# Patient Record
Sex: FEMALE | Race: WHITE | NOT HISPANIC OR LATINO | Employment: OTHER | URBAN - METROPOLITAN AREA
[De-identification: names, ages, dates, MRNs, and addresses within clinical notes are randomized per-mention and may not be internally consistent; named-entity substitution may affect disease eponyms.]

---

## 2017-01-20 ENCOUNTER — HOSPITAL ENCOUNTER (EMERGENCY)
Facility: MEDICAL CENTER | Age: 57
End: 2017-01-21
Attending: EMERGENCY MEDICINE
Payer: MEDICAID

## 2017-01-20 DIAGNOSIS — F10.920 ACUTE ALCOHOL INTOXICATION, UNCOMPLICATED (HCC): ICD-10-CM

## 2017-01-20 PROCEDURE — 99285 EMERGENCY DEPT VISIT HI MDM: CPT

## 2017-01-20 NOTE — ED AVS SNAPSHOT
1/21/2017          Ashleigh Marquis  [address] [place] [address] Street  [facility] [address]    Dear Ashleigh:    [facility] wants to ensure your discharge home is safe and you or your loved ones have had all your questions answered regarding your care after you leave the hospital.    You may receive a telephone call within two days of your discharge.  This call is to make certain you understand your discharge instructions as well as ensure we provided you with the best care possible during your stay with us.     The call will only last approximately 3-5 minutes and will be done by a nurse.    Once again, we want to ensure your discharge home is safe and that you have a clear understanding of any next steps in your care.  If you have any questions or concerns, please do not hesitate to contact us, we are here for you.  Thank you for choosing [facility] for your healthcare needs.    Sincerely,    David Ibrahim    [facility]

## 2017-01-20 NOTE — ED AVS SNAPSHOT
After Visit Summary                                                                                                                Ashleigh Marquis   MRN: [id number]    Department:  [facility], Emergency Dept   Date of Visit:  1/20/2017            [facility], Emergency Dept    [address]    [place] [address]    Phone:  [phone]      You were seen by     1. Ronal Barber M.D.    2. Matt Joyce M.D.      Your Diagnosis Was     Acute alcohol intoxication, uncomplicated (CMS-HCC)     F10.120       Follow-up Information     1. Follow up with [facility] In 1 day.    Specialty:  Family Medicine    Contact information    [address]  [address]  [place] [address]  [phone]        Medication Information     Review all of your home medications and newly ordered medications with your primary doctor and/or pharmacist as soon as possible. Follow medication instructions as directed by your doctor and/or pharmacist.     Please keep your complete medication list with you and share with your physician. Update the information when medications are discontinued, doses are changed, or new medications (including over-the-counter products) are added; and carry medication information at all times in the event of emergency situations.               Medication List      Notice     You have not been prescribed any medications.              Discharge Instructions       Alcohol Intoxication  Alcohol intoxication occurs when the amount of alcohol that a person has consumed impairs his or her ability to mentally and physically function. Alcohol directly impairs the normal chemical activity of the brain. Drinking large amounts of alcohol can lead to changes in mental function and behavior, and it can cause many physical effects that can be harmful.   Alcohol intoxication can range in severity from mild to very severe. Various factors can affect the level of intoxication that  "occurs, such as the person's age, gender, weight, frequency of alcohol consumption, and the presence of other medical conditions (such as diabetes, seizures, or heart conditions). Dangerous levels of alcohol intoxication may occur when people drink large amounts of alcohol in a short period (binge drinking). Alcohol can also be especially dangerous when combined with certain prescription medicines or \"recreational\" drugs.  SIGNS AND SYMPTOMS  Some common signs and symptoms of mild alcohol intoxication include:  · Loss of coordination.  · Changes in mood and behavior.  · Impaired judgment.  · Slurred speech.  As alcohol intoxication progresses to more severe levels, other signs and symptoms will appear. These may include:  · Vomiting.  · Confusion and impaired memory.  · Slowed breathing.  · Seizures.  · Loss of consciousness.  DIAGNOSIS   Your health care provider will take a medical history and perform a physical exam. You will be asked about the amount and type of alcohol you have consumed. Blood tests will be done to measure the concentration of alcohol in your blood. In many places, your blood alcohol level must be lower than 80 mg/dL (0.08%) to legally drive. However, many dangerous effects of alcohol can occur at much lower levels.   TREATMENT   People with alcohol intoxication often do not require treatment. Most of the effects of alcohol intoxication are temporary, and they go away as the alcohol naturally leaves the body. Your health care provider will monitor your condition until you are stable enough to go home. Fluids are sometimes given through an IV access tube to help prevent dehydration.   HOME CARE INSTRUCTIONS  · Do not drive after drinking alcohol.  · Stay hydrated. Drink enough water and fluids to keep your urine clear or pale yellow. Avoid caffeine.    · Only take over-the-counter or prescription medicines as directed by your health care provider.    SEEK MEDICAL CARE IF:   · You have persistent " vomiting.    · You do not feel better after a few days.  · You have frequent alcohol intoxication. Your health care provider can help determine if you should see a substance use treatment counselor.  SEEK IMMEDIATE MEDICAL CARE IF:   · You become shaky or tremble when you try to stop drinking.    · You shake uncontrollably (seizure).    · You throw up (vomit) blood. This may be bright red or may look like black coffee grounds.    · You have blood in your stool. This may be bright red or may appear as a black, tarry, bad smelling stool.    · You become lightheaded or faint.    MAKE SURE YOU:   · Understand these instructions.  · Will watch your condition.  · Will get help right away if you are not doing well or get worse.     This information is not intended to replace advice given to you by your health care provider. Make sure you discuss any questions you have with your health care provider.     Document Released: 09/27/2006 Document Revised: 08/20/2014 Document Reviewed: 05/23/2014  [employer] Interactive Patient Education ©2016 [employer]            Patient Information     Patient Information    Following emergency treatment: all patient requiring follow-up care must return either to a private physician or a clinic if your condition worsens before you are able to obtain further medical attention, please return to the emergency room.     Billing Information    At [facility], we work to make the billing process streamlined for our patients.  Our Representatives are here to answer any questions you may have regarding your hospital bill.  If you have insurance coverage and have supplied your insurance information to us, we will submit a claim to your insurer on your behalf.  Should you have any questions regarding your bill, we can be reached online or by phone as follows:  Online: You are able pay your bills online or live chat with our representatives about any billing questions you may have. We are here to  help Monday - Friday from 8:00am to 7:30pm and 9:00am - 12:00pm on Saturdays.  Please visit [email or web address]  for more information.   Phone:  [phone] or [phone]    Please note that your emergency physician, surgeon, pathologist, radiologist, anesthesiologist, and other specialists are not employed by [facility] and will therefore bill separately for their services.  Please contact them directly for any questions concerning their bills at the numbers below:     Emergency Physician Services:  [phone]  [facility]:  [phone]  [facility]:  [phone]  [facility]:  [phone]    1. Your final bill may vary from the amount quoted upon discharge if all procedures are not complete at that time, or if your doctor has additional procedures of which we are not aware. You will receive an additional bill if you return to the Emergency Department at [facility] access hospital for suture removal regardless of the facility of which the sutures were placed.     2. Please arrange for settlement of this account at the emergency registration.    3. All self-pay accounts are due in full at the time of treatment.  If you are unable to meet this obligation then payment is expected within 4-5 days.     4. If you have had radiology studies (CT, X-ray, Ultrasound, MRI), you have received a preliminary result during your emergency department visit. Please contact the radiology department [phone] to receive a copy of your final result. Please discuss the Final result with your primary physician or with the follow up physician provided.     Crisis Hotline:  [place] Crisis Hotline:  0-191-TEGZCOZ or 1-654.537.4064  [place] Crisis Hotline:    1-925.370.7030 or 860-864-7083         ED Discharge Follow Up Questions    1. In order to provide you with very good care, we would like to follow up with a phone call in the next few days.  May we have  your permission to contact you?     YES /  NO    2. What is the best phone number to call you? (       )_____-__________    3. What is the best time to call you?      Morning  /  Afternoon  /  Evening                   Patient Signature:  ____________________________________________________________    Date:  ____________________________________________________________

## 2017-01-20 NOTE — ED AVS SNAPSHOT
[employer] Access Code: Activation code not generated  Current [employer] Status: Patient Declined    Your email address is not on file at [facility].  Email Addresses are required for you to sign up for [employer], please contact [phone] to verify your personal information and to provide your email address prior to attempting to register for [employer].    Ashleigh Marquis  [address] [place] [address]    [employer]  A secure, online tool to manage your health information     [facility]’s [employer]® is a secure, online tool that connects you to your personalized health information from the privacy of your home -- day or night - making it very easy for you to manage your healthcare. Once the activation process is completed, you can even access your medical information using the [employer] kiel, which is available for free in the Apple Kiel store or Google Play store.     To learn more about [employer], visit [email or web address]    There are two levels of access available (as shown below):   My Chart Features  [facility] Primary Care Doctor [facility]  Specialists [facility]  Urgent  Care Non-[facility] Primary Care Doctor   Email your healthcare team securely and privately 24/7 X X X    Manage appointments: schedule your next appointment; view details of past/upcoming appointments X      Request prescription refills. X      View recent personal medical records, including lab and immunizations X X X X   View health record, including health history, allergies, medications X X X X   Read reports about your outpatient visits, procedures, consult and ER notes X X X X   See your discharge summary, which is a recap of your hospital and/or ER visit that includes your diagnosis, lab results, and care plan X X  X     How to register for [employer]igs[employer]24[employer]:  Once your e-mail address has been verified, follow the following steps to sign up for [employer].     1. Go to  [email or web address]  2. Click on the Sign Up Now box, which takes you to the New  Member Sign Up page. You will need to provide the following information:  a. Enter your [employer] Access Code exactly as it appears at the top of this page. (You will not need to use this code after you’ve completed the sign-up process. If you do not sign up before the expiration date, you must request a new code.)   b. Enter your date of birth.   c. Enter your home email address.   d. Click Submit, and follow the next screen’s instructions.  3. Create a [employer] ID. This will be your [employer] login ID and cannot be changed, so think of one that is secure and easy to remember.  4. Create a [employer] password. You can change your password at any time.  5. Enter your Password Reset Question and Answer. This can be used at a later time if you forget your password.   6. Enter your e-mail address. This allows you to receive e-mail notifications when new information is available in [employer].  7. Click Sign Up. You can now view your health information.    For assistance activating your [employer] account, call [phone]

## 2017-01-21 VITALS
RESPIRATION RATE: 16 BRPM | OXYGEN SATURATION: 99 % | TEMPERATURE: 97.9 F | HEART RATE: 75 BPM | DIASTOLIC BLOOD PRESSURE: 65 MMHG | WEIGHT: 180 LBS | HEIGHT: 66 IN | SYSTOLIC BLOOD PRESSURE: 130 MMHG | BODY MASS INDEX: 28.93 KG/M2

## 2017-01-21 NOTE — ED NOTES
Pt gets out of bed while this RN and ERP are standing at nurses station outside room. Pt takes 2 steps and falls into the door. Pt blocking door and ability to enter room for a minute, pt rolls over as directed and is full assist x 3 to lift back into bed with ERP. ERP examines pt and no new orders received. Bedside RN Sadie ADDISON notified. Pt placed in 2 point restraints.

## 2017-01-21 NOTE — DISCHARGE INSTRUCTIONS
"Alcohol Intoxication  Alcohol intoxication occurs when the amount of alcohol that a person has consumed impairs his or her ability to mentally and physically function. Alcohol directly impairs the normal chemical activity of the brain. Drinking large amounts of alcohol can lead to changes in mental function and behavior, and it can cause many physical effects that can be harmful.   Alcohol intoxication can range in severity from mild to very severe. Various factors can affect the level of intoxication that occurs, such as the person's age, gender, weight, frequency of alcohol consumption, and the presence of other medical conditions (such as diabetes, seizures, or heart conditions). Dangerous levels of alcohol intoxication may occur when people drink large amounts of alcohol in a short period (binge drinking). Alcohol can also be especially dangerous when combined with certain prescription medicines or \"recreational\" drugs.  SIGNS AND SYMPTOMS  Some common signs and symptoms of mild alcohol intoxication include:  · Loss of coordination.  · Changes in mood and behavior.  · Impaired judgment.  · Slurred speech.  As alcohol intoxication progresses to more severe levels, other signs and symptoms will appear. These may include:  · Vomiting.  · Confusion and impaired memory.  · Slowed breathing.  · Seizures.  · Loss of consciousness.  DIAGNOSIS   Your health care provider will take a medical history and perform a physical exam. You will be asked about the amount and type of alcohol you have consumed. Blood tests will be done to measure the concentration of alcohol in your blood. In many places, your blood alcohol level must be lower than 80 mg/dL (0.08%) to legally drive. However, many dangerous effects of alcohol can occur at much lower levels.   TREATMENT   People with alcohol intoxication often do not require treatment. Most of the effects of alcohol intoxication are temporary, and they go away as the alcohol naturally " leaves the body. Your health care provider will monitor your condition until you are stable enough to go home. Fluids are sometimes given through an IV access tube to help prevent dehydration.   HOME CARE INSTRUCTIONS  · Do not drive after drinking alcohol.  · Stay hydrated. Drink enough water and fluids to keep your urine clear or pale yellow. Avoid caffeine.    · Only take over-the-counter or prescription medicines as directed by your health care provider.    SEEK MEDICAL CARE IF:   · You have persistent vomiting.    · You do not feel better after a few days.  · You have frequent alcohol intoxication. Your health care provider can help determine if you should see a substance use treatment counselor.  SEEK IMMEDIATE MEDICAL CARE IF:   · You become shaky or tremble when you try to stop drinking.    · You shake uncontrollably (seizure).    · You throw up (vomit) blood. This may be bright red or may look like black coffee grounds.    · You have blood in your stool. This may be bright red or may appear as a black, tarry, bad smelling stool.    · You become lightheaded or faint.    MAKE SURE YOU:   · Understand these instructions.  · Will watch your condition.  · Will get help right away if you are not doing well or get worse.     This information is not intended to replace advice given to you by your health care provider. Make sure you discuss any questions you have with your health care provider.     Document Released: 09/27/2006 Document Revised: 08/20/2014 Document Reviewed: 05/23/2014  [employer] Interactive Patient Education ©2016 [employer].

## 2017-01-21 NOTE — ED NOTES
"Pt swinging legs over railing and attempting to kick staff, screaming \"fuck you\" over and over. 1 restraint applied to leg for safety.  "

## 2017-01-21 NOTE — ED NOTES
Pt given discharge and follow up instructions,  all questions answered,  pt verbalized understanding. Pt discharged with self. Copy of discharge provided to pt. Signed copy in chart.  Pt states that all personal belongings are in possession. Pt ambulatory off unit.

## 2017-01-21 NOTE — ED NOTES
Pt ambulatory to bathroom with steady gait, speech clear, requesting to wait until 0515 before being discharged.

## 2017-01-21 NOTE — ED PROVIDER NOTES
"ED Provider Note    CHIEF COMPLAINT  Chief Complaint   Patient presents with   • Alcohol Intoxication       HPI  Ashleigh Marquis is a 56 y.o. female who presents with alcohol intoxication, very well-known to this emergency department with numerous evaluations in the recent past for the same. Patient does drink alcohol, no history supportive of trauma, she has no other complaints otherwise although is clearly a poor historian at this point.    REVIEW OF SYSTEMS  Negative for fever, rash, chest pain, dyspnea, abdominal pain, nausea, vomiting, diarrhea, headache, focal weakness, focal numbness, focal tingling, back pain. All other systems are negative.     PAST MEDICAL HISTORY  Past Medical History   Diagnosis Date   • Hypertension    • Vitamin D deficiency    • Chronic low back pain    • Muscle disorder    • Anxiety    • OSTEOPOROSIS    • Arthritis    • GERD (gastroesophageal reflux disease)    • Ulcer (CMS-HCC)    • ASTHMA    • HTN    • Cancer (CMS-HCC) 1981     cervical   • Renal disorder      Hx of stones   • Indigestion      GERD   • Congestive heart failure (CMS-HCC)    • Psychiatric disorder    • PTSD (post-traumatic stress disorder)    • Depression    • Seizure (CMS-HCC)      several years ago r/t alcohol withdrawl   • Other specified symptom associated with female genital organs      \"I have fibroids bad\"\"   • Fall      alcohol related   • Alcoholism (CMS-HCC)    • EtOH dependence ([facility])        FAMILY HISTORY  Family History   Problem Relation Age of Onset   • Heart Disease Father    • Hypertension Father    • Diabetes Father    • Cancer Paternal Grandmother    • Depression Other    • Lung Disease Neg Hx    • Stroke Neg Hx        SOCIAL HISTORY  Social History   Substance Use Topics   • Smoking status: Current Every Day Smoker -- 0.50 packs/day for 20 years     Types: Cigarettes   • Smokeless tobacco: Never Used      Comment: 1/2 pack per day   • Alcohol Use: Yes      Comment: \"lots of vodka\" " "      SURGICAL HISTORY  Past Surgical History   Procedure Laterality Date   • Hernia repair  1977   • Cysto stent placemnt pre surg  10/7/2010     Performed by ANGEL HARRIS at [facility]ERY [facility]   • Cystoscopy stent placement  11/9/2010     Performed by ANGEL HARRIS at SUR[facility]ERY [facility]   • Ureteroscopy  11/9/2010     Performed by ANGEL HARRIS at SUR[facility]ERY [facility]   • Lasertripsy  11/9/2010     Performed by ANGEL HARRIS at SUR[facility]   • Stent removal  11/9/2010     Performed by ANGEL HARRIS at SUR[facility]ERY [facility]       CURRENT MEDICATIONS  I personally reviewed the medication list in the charting documentation.     ALLERGIES  Allergies   Allergen Reactions   • Sulfa Drugs Vomiting   • Toradol Vomiting   • Neurontin [Gabapentin]      Bowel incontinence         MEDICAL RECORD  I have reviewed patient's medical record and pertinent results are listed above.      PHYSICAL EXAM  VITAL SIGNS: /65 mmHg  Pulse 75  Temp(Src) 36.6 °C (97.9 °F)  Resp 16  Ht 1.676 m (5' 6\")  Wt 81.647 kg (180 lb)  BMI 29.07 kg/m2  SpO2 99%   Constitutional: Well appearing patient in no acute distress.  Not toxic, nor ill in appearance.  HENT: Mucus membranes moist. No sign of trauma  Eyes: No scleral icterus. Injected conjunctiva bilaterally    Neck: Supple, comfortable, nonpainful range of motion.   Cardiovascular: Slight tachycardia but regular  Thorax & Lungs: Chest is nontender.  Lungs are clear to auscultation with good air movement bilaterally.  No wheeze, rhonchi, nor rales.   Abdomen: Soft, with no tenderness, rebound nor guarding.  No mass or pulsatile mass appreciated.  Skin: Warm, dry. No rash appreciated  Extremities/Musculoskeletal: No sign of trauma. No asymmetric calf tenderness, erythema or edema. Normal range of motion   Neurologic: Slurred speech, appears quite intoxicated but no focal deficits appreciated    COURSE & MEDICAL DECISION MAKING  I have reviewed any medical " record information, laboratory studies and radiographic results as noted above.    Encounter Summary: This is a 56 y.o. female with alcohol intoxication, many evaluations for the same in this emergency department. No sign of trauma on her, my plan is to wait for her to be clinically sober, she'll be reevaluated at that time discharged with strict return instructions.      DISPOSITION: Discharge home in stable condition      FINAL IMPRESSION  1. Acute alcohol intoxication, uncomplicated (CMS-[facility])           This dictation was created using voice recognition software. The accuracy of the dictation is limited to the abilities of the software. I expect there may be some errors of grammar and possibly content. The nursing notes were reviewed and certain aspects of this information were incorporated into this note.    Electronically signed by: Ronal Barber, 1/20/2017 8:51 PM

## 2017-02-04 ENCOUNTER — APPOINTMENT (OUTPATIENT)
Dept: RADIOLOGY | Facility: MEDICAL CENTER | Age: 57
End: 2017-02-04
Attending: EMERGENCY MEDICINE
Payer: MEDICAID

## 2017-02-04 ENCOUNTER — HOSPITAL ENCOUNTER (EMERGENCY)
Facility: MEDICAL CENTER | Age: 57
End: 2017-02-05
Attending: EMERGENCY MEDICINE
Payer: MEDICAID

## 2017-02-04 DIAGNOSIS — S00.83XA FACIAL CONTUSION, INITIAL ENCOUNTER: ICD-10-CM

## 2017-02-04 DIAGNOSIS — F10.929 ALCOHOL INTOXICATION, WITH UNSPECIFIED COMPLICATION (HCC): ICD-10-CM

## 2017-02-04 LAB — ETHANOL BLD-MCNC: 0.3 G/DL

## 2017-02-04 PROCEDURE — 72125 CT NECK SPINE W/O DYE: CPT

## 2017-02-04 PROCEDURE — 70450 CT HEAD/BRAIN W/O DYE: CPT

## 2017-02-04 PROCEDURE — 99284 EMERGENCY DEPT VISIT MOD MDM: CPT

## 2017-02-04 PROCEDURE — 80307 DRUG TEST PRSMV CHEM ANLYZR: CPT

## 2017-02-04 ASSESSMENT — PAIN SCALES - WONG BAKER: WONGBAKER_NUMERICALRESPONSE: HURTS EVEN MORE

## 2017-02-04 NOTE — ED AVS SNAPSHOT
After Visit Summary                                                                                                                Ashleigh Marquis   MRN: [id number]    Department:  [facility], Emergency Dept   Date of Visit:  2/4/2017            [facility], Emergency Dept    [address]    [place] [address]    Phone:  [phone]      You were seen by     Rober Dyer M.D.      Your Diagnosis Was     Alcohol intoxication, with unspecified complication (CMS-HCC)     F10.129       Follow-up Information     1. Follow up with [facility].    Specialty:  Family Medicine    Why:  As needed    Contact information    [address]  [address]  [place] [address]  [phone]        Medication Information     Review all of your home medications and newly ordered medications with your primary doctor and/or pharmacist as soon as possible. Follow medication instructions as directed by your doctor and/or pharmacist.     Please keep your complete medication list with you and share with your physician. Update the information when medications are discontinued, doses are changed, or new medications (including over-the-counter products) are added; and carry medication information at all times in the event of emergency situations.               Medication List      Notice     You have not been prescribed any medications.            Procedures and tests performed during your visit     CT-CSPINE WITHOUT PLUS RECONS    CT-HEAD W/O    DIAGNOSTIC ALCOHOL        Discharge Instructions       Alcohol Intoxication  Alcohol intoxication occurs when the amount of alcohol that a person has consumed impairs his or her ability to mentally and physically function. Alcohol directly impairs the normal chemical activity of the brain. Drinking large amounts of alcohol can lead to changes in mental function and behavior, and it can cause many physical effects that can be harmful.   Alcohol intoxication can  "range in severity from mild to very severe. Various factors can affect the level of intoxication that occurs, such as the person's age, gender, weight, frequency of alcohol consumption, and the presence of other medical conditions (such as diabetes, seizures, or heart conditions). Dangerous levels of alcohol intoxication may occur when people drink large amounts of alcohol in a short period (binge drinking). Alcohol can also be especially dangerous when combined with certain prescription medicines or \"recreational\" drugs.  SIGNS AND SYMPTOMS  Some common signs and symptoms of mild alcohol intoxication include:  · Loss of coordination.  · Changes in mood and behavior.  · Impaired judgment.  · Slurred speech.  As alcohol intoxication progresses to more severe levels, other signs and symptoms will appear. These may include:  · Vomiting.  · Confusion and impaired memory.  · Slowed breathing.  · Seizures.  · Loss of consciousness.  DIAGNOSIS   Your health care provider will take a medical history and perform a physical exam. You will be asked about the amount and type of alcohol you have consumed. Blood tests will be done to measure the concentration of alcohol in your blood. In many places, your blood alcohol level must be lower than 80 mg/dL (0.08%) to legally drive. However, many dangerous effects of alcohol can occur at much lower levels.   TREATMENT   People with alcohol intoxication often do not require treatment. Most of the effects of alcohol intoxication are temporary, and they go away as the alcohol naturally leaves the body. Your health care provider will monitor your condition until you are stable enough to go home. Fluids are sometimes given through an IV access tube to help prevent dehydration.   HOME CARE INSTRUCTIONS  · Do not drive after drinking alcohol.  · Stay hydrated. Drink enough water and fluids to keep your urine clear or pale yellow. Avoid caffeine.    · Only take over-the-counter or " prescription medicines as directed by your health care provider.    SEEK MEDICAL CARE IF:   · You have persistent vomiting.    · You do not feel better after a few days.  · You have frequent alcohol intoxication. Your health care provider can help determine if you should see a substance use treatment counselor.  SEEK IMMEDIATE MEDICAL CARE IF:   · You become shaky or tremble when you try to stop drinking.    · You shake uncontrollably (seizure).    · You throw up (vomit) blood. This may be bright red or may look like black coffee grounds.    · You have blood in your stool. This may be bright red or may appear as a black, tarry, bad smelling stool.    · You become lightheaded or faint.    MAKE SURE YOU:   · Understand these instructions.  · Will watch your condition.  · Will get help right away if you are not doing well or get worse.     This information is not intended to replace advice given to you by your health care provider. Make sure you discuss any questions you have with your health care provider.     Document Released: 09/27/2006 Document Revised: 08/20/2014 Document Reviewed: 05/23/2014  [employer] Interactive Patient Education ©2016 Else[employer] Inc.  Head Injury, Adult  You have a head injury. Headaches and throwing up (vomiting) are common after a head injury. It should be easy to wake up from sleeping. Sometimes you must stay in the hospital. Most problems happen within the first 24 hours. Side effects may occur up to 7-10 days after the injury.   WHAT ARE THE TYPES OF HEAD INJURIES?  Head injuries can be as minor as a bump. Some head injuries can be more severe. More severe head injuries include:  · A jarring injury to the brain (concussion).  · A bruise of the brain (contusion). This mean there is bleeding in the brain that can cause swelling.  · A cracked skull (skull fracture).  · Bleeding in the brain that collects, clots, and forms a bump (hematoma).  WHEN SHOULD I GET HELP RIGHT AWAY?   · You are  confused or sleepy.  · You cannot be woken up.  · You feel sick to your stomach (nauseous) or keep throwing up (vomiting).  · Your dizziness or unsteadiness is getting worse.  · You have very bad, lasting headaches that are not helped by medicine. Take medicines only as told by your doctor.  · You cannot use your arms or legs like normal.  · You cannot walk.  · You notice changes in the black spots in the center of the colored part of your eye (pupil).  · You have clear or bloody fluid coming from your nose or ears.  · You have trouble seeing.  During the next 24 hours after the injury, you must stay with someone who can watch you. This person should get help right away (call 911 in the [place] if you start to shake and are not able to control it (have seizures), you pass out, or you are unable to wake up.  HOW CAN I PREVENT A HEAD INJURY IN THE FUTURE?  · Wear seat belts.  · Wear a helmet while bike riding and playing sports like football.  · Stay away from dangerous activities around the house.  WHEN CAN I RETURN TO NORMAL ACTIVITIES AND ATHLETICS?  See your doctor before doing these activities. You should not do normal activities or play contact sports until 1 week after the following symptoms have stopped:  · Headache that does not go away.  · Dizziness.  · Poor attention.  · Confusion.  · Memory problems.  · Sickness to your stomach or throwing up.  · Tiredness.  · Fussiness.  · Bothered by bright lights or loud noises.  · Anxiousness or depression.  · Restless sleep.  MAKE SURE YOU:   · Understand these instructions.  · Will watch your condition.  · Will get help right away if you are not doing well or get worse.     This information is not intended to replace advice given to you by your health care provider. Make sure you discuss any questions you have with your health care provider.     Document Released: 11/30/2009 Document Revised: 01/08/2016 Document Reviewed: 08/25/2014  treadalong Interactive Patient  Education ©2016 [employer]  Contusion  A contusion is a deep bruise. Contusions happen when an injury causes bleeding under the skin. Signs of bruising include pain, puffiness (swelling), and discolored skin. The contusion may turn blue, purple, or yellow.  HOME CARE   · Put ice on the injured area.  · Put ice in a plastic bag.  · Place a towel between your skin and the bag.  · Leave the ice on for 15-20 minutes, 03-04 times a day.  · Only take medicine as told by your doctor.  · Rest the injured area.  · If possible, raise (elevate) the injured area to lessen puffiness.  GET HELP RIGHT AWAY IF:   · You have more bruising or puffiness.  · You have pain that is getting worse.  · Your puffiness or pain is not helped by medicine.  MAKE SURE YOU:   · Understand these instructions.  · Will watch your condition.  · Will get help right away if you are not doing well or get worse.     This information is not intended to replace advice given to you by your health care provider. Make sure you discuss any questions you have with your health care provider.     Document Released: 06/05/2009 Document Revised: 03/11/2013 Document Reviewed: 10/22/2012  [employer] Interactive Patient Education ©2016 [employer]            Patient Information     Patient Information    Following emergency treatment: all patient requiring follow-up care must return either to a private physician or a clinic if your condition worsens before you are able to obtain further medical attention, please return to the emergency room.     Billing Information    At [facility], we work to make the billing process streamlined for our patients.  Our Representatives are here to answer any questions you may have regarding your hospital bill.  If you have insurance coverage and have supplied your insurance information to us, we will submit a claim to your insurer on your behalf.  Should you have any questions regarding your bill, we can be reached online or by  phone as follows:  Online: You are able pay your bills online or live chat with our representatives about any billing questions you may have. We are here to help Monday - Friday from 8:00am to 7:30pm and 9:00am - 12:00pm on Saturdays.  Please visit [email or web address]  for more information.   Phone:  [phone] or [phone]    Please note that your emergency physician, surgeon, pathologist, radiologist, anesthesiologist, and other specialists are not employed by [facility] and will therefore bill separately for their services.  Please contact them directly for any questions concerning their bills at the numbers below:     Emergency Physician Services:  [phone]  [facility]:  [phone]  [facility]:  [phone]  [facility]:  [phone]    1. Your final bill may vary from the amount quoted upon discharge if all procedures are not complete at that time, or if your doctor has additional procedures of which we are not aware. You will receive an additional bill if you return to the Emergency Department at [facility] for suture removal regardless of the facility of which the sutures were placed.     2. Please arrange for settlement of this account at the emergency registration.    3. All self-pay accounts are due in full at the time of treatment.  If you are unable to meet this obligation then payment is expected within 4-5 days.     4. If you have had radiology studies (CT, X-ray, Ultrasound, MRI), you have received a preliminary result during your emergency department visit. Please contact the radiology department [phone] to receive a copy of your final result. Please discuss the Final result with your primary physician or with the follow up physician provided.     Crisis Hotline:  [place] Crisis Hotline:  3-317-HFNXSYB or 1-346.576.5975  [place] Crisis Hotline:    1-455.979.1797 or 142-107-1093         ED  Discharge Follow Up Questions    1. In order to provide you with very good care, we would like to follow up with a phone call in the next few days.  May we have your permission to contact you?     YES /  NO    2. What is the best phone number to call you? (       )_____-__________    3. What is the best time to call you?      Morning  /  Afternoon  /  Evening                   Patient Signature:  ____________________________________________________________    Date:  ____________________________________________________________

## 2017-02-04 NOTE — ED AVS SNAPSHOT
2/4/2017          Ashleigh Marquis  [address] [place] [address] Street  [facility] [address]    Dear Ashleigh:    [facility] wants to ensure your discharge home is safe and you or your loved ones have had all your questions answered regarding your care after you leave the hospital.    You may receive a telephone call within two days of your discharge.  This call is to make certain you understand your discharge instructions as well as ensure we provided you with the best care possible during your stay with us.     The call will only last approximately 3-5 minutes and will be done by a nurse.    Once again, we want to ensure your discharge home is safe and that you have a clear understanding of any next steps in your care.  If you have any questions or concerns, please do not hesitate to contact us, we are here for you.  Thank you for choosing [facility] for your healthcare needs.    Sincerely,    David Ibrahim    [facility]

## 2017-02-04 NOTE — ED AVS SNAPSHOT
[employer] Access Code: Activation code not generated  Current [employer] Status: Patient Declined    Your email address is not on file at [employer].  Email Addresses are required for you to sign up for [employer], please contact [phone] to verify your personal information and to provide your email address prior to attempting to register for [employer].    Ashleigh Marquis  [address] [place] [address]    [employer]  A secure, online tool to manage your health information     [employer]’s [employer]® is a secure, online tool that connects you to your personalized health information from the privacy of your home -- day or night - making it very easy for you to manage your healthcare. Once the activation process is completed, you can even access your medical information using the [employer] kiel, which is available for free in the Apple Kiel store or Google Play store.     To learn more about [employer], visit [email or web address]    There are two levels of access available (as shown below):   My Chart Features  [facility] Primary Care Doctor [facility]  Specialists [facility]  Urgent  Care Non-[facility] Primary Care Doctor   Email your healthcare team securely and privately 24/7 X X X    Manage appointments: schedule your next appointment; view details of past/upcoming appointments X      Request prescription refills. X      View recent personal medical records, including lab and immunizations X X X X   View health record, including health history, allergies, medications X X X X   Read reports about your outpatient visits, procedures, consult and ER notes X X X X   See your discharge summary, which is a recap of your hospital and/or ER visit that includes your diagnosis, lab results, and care plan X X  X     How to register for [employer]:  Once your e-mail address has been verified, follow the following steps to sign up for [employer].     1. Go to  [email or web address]  2. Click on the Sign Up Now box, which takes you to the New  Member Sign Up page. You will need to provide the following information:  a. Enter your [employer] Access Code exactly as it appears at the top of this page. (You will not need to use this code after you’ve completed the sign-up process. If you do not sign up before the expiration date, you must request a new code.)   b. Enter your date of birth.   c. Enter your home email address.   d. Click Submit, and follow the next screen’s instructions.  3. Create a theDropt ID. This will be your [employer] login ID and cannot be changed, so think of one that is secure and easy to remember.  4. Create a [employer] password. You can change your password at any time.  5. Enter your Password Reset Question and Answer. This can be used at a later time if you forget your password.   6. Enter your e-mail address. This allows you to receive e-mail notifications when new information is available in [employer].  7. Click Sign Up. You can now view your health information.    For assistance activating your [employer] account, call [phone]

## 2017-02-05 VITALS
BODY MASS INDEX: 28.93 KG/M2 | SYSTOLIC BLOOD PRESSURE: 127 MMHG | HEIGHT: 66 IN | RESPIRATION RATE: 16 BRPM | TEMPERATURE: 96.9 F | DIASTOLIC BLOOD PRESSURE: 80 MMHG | HEART RATE: 90 BPM | OXYGEN SATURATION: 95 % | WEIGHT: 180 LBS

## 2017-02-05 NOTE — ED PROVIDER NOTES
"ED Provider Note    CHIEF COMPLAINT  Chief Complaint   Patient presents with   • Alcohol Intoxication   • GLF     witnessed stumble and fall in front of record street, onto face, -loc, [place]   • Facial Pain     large hematoma on forehead       HPI  Ashleigh Marquis is a 56 y.o. female who presents to the ED with alcohol intoxication and head injury. Witness trip and fall onto face. No LOC reported. VSS per transport. Patient awake yet intoxicated. Admits to Etoh use. Denies drug use. Denies blood thinner medication. Reports recent tetanus immunization.     REVIEW OF SYSTEMS  See HPI for further details. All other systems are negative.     PAST MEDICAL HISTORY   has a past medical history of Hypertension; Vitamin D deficiency; Chronic low back pain; Muscle disorder; Anxiety; OSTEOPOROSIS; Arthritis; GERD (gastroesophageal reflux disease); Ulcer (CMS-HCC); ASTHMA; HTN; Cancer (CMS-[facility]) (1981); Renal disorder; Indigestion; Congestive heart failure (CMS-HCC); Psychiatric disorder; PTSD (post-traumatic stress disorder); Depression; Seizure (CMS-HCC); Other specified symptom associated with female genital organs; Fall; Alcoholism (CMS-HCC); and EtOH dependence ([facility]).    SOCIAL HISTORY  Social History     Social History Main Topics   • Smoking status: Current Every Day Smoker -- 0.50 packs/day for 20 years     Types: Cigarettes   • Smokeless tobacco: Never Used      Comment: 1/2 pack per day   • Alcohol Use: Yes      Comment: \"lots of vodka\"   • Drug Use: No   • Sexual Activity: No       SURGICAL HISTORY   has past surgical history that includes hernia repair (1977); cysto stent placemnt pre surg (10/7/2010); cystoscopy stent placement (11/9/2010); ureteroscopy (11/9/2010); lasertripsy (11/9/2010); and stent removal (11/9/2010).    CURRENT MEDICATIONS  Home Medications     Reviewed by Dhara Noel R.N. (Registered Nurse) on 02/04/17 at 1834  Med List Status: <None>    Medication Last Dose Status          Patient " "Tyron Taking any Medications                        ALLERGIES  Allergies   Allergen Reactions   • Sulfa Drugs Vomiting   • Toradol Vomiting   • Neurontin [Gabapentin]      Bowel incontinence         PHYSICAL EXAM  VITAL SIGNS: /86 mmHg  Pulse 87  Temp(Src) 36.1 °C (96.9 °F)  Resp 18  Ht 1.676 m (5' 6\")  Wt 81.647 kg (180 lb)  BMI 29.07 kg/m2 @JESSIKA[[id number]::@   Pulse ox interpretation: I interpret this pulse ox as normal.  Constitutional: Drowsy yet easily arousable.   HENT: Hematoma to forehead. Abrasion to bridge of nose w/o significant swelling or septal hematoma. No other facial trauma evident. Midface stable.   Eyes: Pupils are equal and reactive, Conjunctiva normal, Non-icteric.   Neck: Normal range of motion, No tenderness, Supple, No stridor. No JVD.  Lymphatic: No lymphadenopathy noted.   Cardiovascular: Regular rate and rhythm, no murmurs.   Thorax & Lungs: Normal breath sounds, No respiratory distress, No wheezing, No chest tenderness.   Abdomen: Bowel sounds normal, Soft, No tenderness, No masses, No pulsatile masses. No peritoneal signs.  Skin: Abrasions as above  Back: No bony tenderness, No CVA tenderness.   Extremities: Intact distal pulses, No edema, No tenderness,   Musculoskeletal: Good range of motion in all major joints. No tenderness to palpation or major deformities noted.   Neurologic: Normal motor function, Normal sensory function, No focal deficits noted.   Psychiatric: No SI    DIAGNOSTIC STUDIES / PROCEDURES    LABS  Labs Reviewed   DIAGNOSTIC ALCOHOL - Abnormal; Notable for the following:     Diagnostic Alcohol 0.30 (*)     All other components within normal limits     RADIOLOGY   CT-HEAD W/O (Final result) Result time: 02/04/17 19:52:48     Final result     Impression:       No acute intracranial abnormality identified.     Narrative:       2/4/2017 7:09 PM    HISTORY/REASON FOR EXAM:  Head Injury.      TECHNIQUE/EXAM DESCRIPTION AND NUMBER OF VIEWS:  CT of the head without " contrast.    The study was performed on a helical multidetector CT scanner. Contiguous 5 mm axial sections were obtained from the skull base through the vertex.    Up to date radiation dose reduction adjustments have been utilized to meet ALARA standards for radiation dose reduction.    COMPARISON:  CT head 11/10/2016    FINDINGS:  The calvaria are normal in appearance.    There is a small frontal scalp hematoma.    The brain parenchyma is normal in appearance.    The ventricular system is normal in size and position.    There is no hemorrhage or evidence for acute infarct.    There are no extra-axial fluid collection.    The visualized paranasal sinuses are clear.    The mastoid air cells and middle ear are clear.    The visualized portions of the orbits are normal in appearance.                   CT-CSPINE WITHOUT PLUS RECONS (Final result) Result time: 02/04/17 19:55:25     Final result     Impression:         1. No acute fracture from C1 through T3 is visualized.       Narrative:       2/4/2017 7:09 PM    HISTORY/REASON FOR EXAM: ALCOHOL INTOXICATION  GLF  FACIAL PAIN      TECHNIQUE/EXAM DESCRIPTION:  CT cervical spine without contrast, with reconstructions.    The study was performed on a helical multidetector CT scanner. Thin-section helical scanning was performed from the skull base through upper thoracic spine. Sagittal and coronal multiplanar reconstructions were generated from the axial images.    Low dose optimization technique was utilized for this CT exam including automated exposure control and adjustment of the mA and/or kV according to patient size.    COMPARISON:  8/24/2016.    FINDINGS:  Evaluation of the lower cervical spine is limited due to body habitus and streak artifact.    Alignment in the cervical spine is normal. There is no fracture or dislocation. The craniovertebral junction appears intact.    The prevertebral and paraspinous soft tissues are unremarkable.    Moderate degenerative  change of the lower cervical spine.    The superior mediastinum and lung apices in the field of view are unremarkable.             COURSE & MEDICAL DECISION MAKING  Pertinent Labs & Imaging studies reviewed. (See chart for details)  Vital signs stable. No focal neuro deficits. Admits to Etoh and confirmed per lab work. Facial injury w/ stable CT imaging. Recent Tetanus per patient. Patient discharged when clinically sober. Wound care instructions provided.   The patient will not drink alcohol nor drive with prescribed medications. The patient will return for worsening symptoms and is stable at the time of discharge. The patient verbalizes understanding and will comply.    FINAL IMPRESSION  1. Alcohol intoxication, with unspecified complication (CMS-HCC)      2. Facial contusion, initial encounter             Electronically signed by: Rober Dyer, 2/4/2017 6:56 PM

## 2017-02-05 NOTE — ED NOTES
Pt bib ems.  Chief Complaint   Patient presents with   • Alcohol Intoxication   • GLF     witnessed stumble and fall in front of record street, onto face, -loc, [place]   • Facial Pain     large hematoma on forehead     Pt yelling out, moaning, demanding a blanket.  Chart up for ERP.

## 2017-02-05 NOTE — DISCHARGE INSTRUCTIONS
"Alcohol Intoxication  Alcohol intoxication occurs when the amount of alcohol that a person has consumed impairs his or her ability to mentally and physically function. Alcohol directly impairs the normal chemical activity of the brain. Drinking large amounts of alcohol can lead to changes in mental function and behavior, and it can cause many physical effects that can be harmful.   Alcohol intoxication can range in severity from mild to very severe. Various factors can affect the level of intoxication that occurs, such as the person's age, gender, weight, frequency of alcohol consumption, and the presence of other medical conditions (such as diabetes, seizures, or heart conditions). Dangerous levels of alcohol intoxication may occur when people drink large amounts of alcohol in a short period (binge drinking). Alcohol can also be especially dangerous when combined with certain prescription medicines or \"recreational\" drugs.  SIGNS AND SYMPTOMS  Some common signs and symptoms of mild alcohol intoxication include:  · Loss of coordination.  · Changes in mood and behavior.  · Impaired judgment.  · Slurred speech.  As alcohol intoxication progresses to more severe levels, other signs and symptoms will appear. These may include:  · Vomiting.  · Confusion and impaired memory.  · Slowed breathing.  · Seizures.  · Loss of consciousness.  DIAGNOSIS   Your health care provider will take a medical history and perform a physical exam. You will be asked about the amount and type of alcohol you have consumed. Blood tests will be done to measure the concentration of alcohol in your blood. In many places, your blood alcohol level must be lower than 80 mg/dL (0.08%) to legally drive. However, many dangerous effects of alcohol can occur at much lower levels.   TREATMENT   People with alcohol intoxication often do not require treatment. Most of the effects of alcohol intoxication are temporary, and they go away as the alcohol naturally " leaves the body. Your health care provider will monitor your condition until you are stable enough to go home. Fluids are sometimes given through an IV access tube to help prevent dehydration.   HOME CARE INSTRUCTIONS  · Do not drive after drinking alcohol.  · Stay hydrated. Drink enough water and fluids to keep your urine clear or pale yellow. Avoid caffeine.    · Only take over-the-counter or prescription medicines as directed by your health care provider.    SEEK MEDICAL CARE IF:   · You have persistent vomiting.    · You do not feel better after a few days.  · You have frequent alcohol intoxication. Your health care provider can help determine if you should see a substance use treatment counselor.  SEEK IMMEDIATE MEDICAL CARE IF:   · You become shaky or tremble when you try to stop drinking.    · You shake uncontrollably (seizure).    · You throw up (vomit) blood. This may be bright red or may look like black coffee grounds.    · You have blood in your stool. This may be bright red or may appear as a black, tarry, bad smelling stool.    · You become lightheaded or faint.    MAKE SURE YOU:   · Understand these instructions.  · Will watch your condition.  · Will get help right away if you are not doing well or get worse.     This information is not intended to replace advice given to you by your health care provider. Make sure you discuss any questions you have with your health care provider.     Document Released: 09/27/2006 Document Revised: 08/20/2014 Document Reviewed: 05/23/2014  [employer] Patient Education ©2016 [employer]  Head Injury, Adult  You have a head injury. Headaches and throwing up (vomiting) are common after a head injury. It should be easy to wake up from sleeping. Sometimes you must stay in the hospital. Most problems happen within the first 24 hours. Side effects may occur up to 7-10 days after the injury.   WHAT ARE THE TYPES OF HEAD INJURIES?  Head injuries can be as minor as a  bump. Some head injuries can be more severe. More severe head injuries include:  · A jarring injury to the brain (concussion).  · A bruise of the brain (contusion). This mean there is bleeding in the brain that can cause swelling.  · A cracked skull (skull fracture).  · Bleeding in the brain that collects, clots, and forms a bump (hematoma).  WHEN SHOULD I GET HELP RIGHT AWAY?   · You are confused or sleepy.  · You cannot be woken up.  · You feel sick to your stomach (nauseous) or keep throwing up (vomiting).  · Your dizziness or unsteadiness is getting worse.  · You have very bad, lasting headaches that are not helped by medicine. Take medicines only as told by your doctor.  · You cannot use your arms or legs like normal.  · You cannot walk.  · You notice changes in the black spots in the center of the colored part of your eye (pupil).  · You have clear or bloody fluid coming from your nose or ears.  · You have trouble seeing.  During the next 24 hours after the injury, you must stay with someone who can watch you. This person should get help right away (call 911 in the [place] if you start to shake and are not able to control it (have seizures), you pass out, or you are unable to wake up.  HOW CAN I PREVENT A HEAD INJURY IN THE FUTURE?  · Wear seat belts.  · Wear a helmet while bike riding and playing sports like football.  · Stay away from dangerous activities around the house.  WHEN CAN I RETURN TO NORMAL ACTIVITIES AND ATHLETICS?  See your doctor before doing these activities. You should not do normal activities or play contact sports until 1 week after the following symptoms have stopped:  · Headache that does not go away.  · Dizziness.  · Poor attention.  · Confusion.  · Memory problems.  · Sickness to your stomach or throwing up.  · Tiredness.  · Fussiness.  · Bothered by bright lights or loud noises.  · Anxiousness or depression.  · Restless sleep.  MAKE SURE YOU:   · Understand these instructions.  · Will  watch your condition.  · Will get help right away if you are not doing well or get worse.     This information is not intended to replace advice given to you by your health care provider. Make sure you discuss any questions you have with your health care provider.     Document Released: 11/30/2009 Document Revised: 01/08/2016 Document Reviewed: 08/25/2014  [employer] Patient Education ©2016 [employer]  Contusion  A contusion is a deep bruise. Contusions happen when an injury causes bleeding under the skin. Signs of bruising include pain, puffiness (swelling), and discolored skin. The contusion may turn blue, purple, or yellow.  HOME CARE   · Put ice on the injured area.  · Put ice in a plastic bag.  · Place a towel between your skin and the bag.  · Leave the ice on for 15-20 minutes, 03-04 times a day.  · Only take medicine as told by your doctor.  · Rest the injured area.  · If possible, raise (elevate) the injured area to lessen puffiness.  GET HELP RIGHT AWAY IF:   · You have more bruising or puffiness.  · You have pain that is getting worse.  · Your puffiness or pain is not helped by medicine.  MAKE SURE YOU:   · Understand these instructions.  · Will watch your condition.  · Will get help right away if you are not doing well or get worse.     This information is not intended to replace advice given to you by your health care provider. Make sure you discuss any questions you have with your health care provider.     Document Released: 06/05/2009 Document Revised: 03/11/2013 Document Reviewed: 10/22/2012  [employer] Patient Education ©2016 [employer]

## 2017-02-05 NOTE — ED NOTES
Patient given discharge teaching and education. Verbalizes understanding. Given chance to ask questions, all questions answered. Educated patient of signs and symptoms to returned to ER, and educated patient they can return for any concerning symptoms. 0 prescriptions provided to patient. Education given to patient about medications.Patient states they have their belongings. Denies additional needs at this time. Reports pain is well controlled. Patient monitored every hour and PRN for safety and comfort. Patient ambulatory out of department.

## 2017-02-15 ENCOUNTER — HOSPITAL ENCOUNTER (EMERGENCY)
Facility: MEDICAL CENTER | Age: 57
End: 2017-02-15
Attending: EMERGENCY MEDICINE
Payer: MEDICAID

## 2017-02-15 ENCOUNTER — APPOINTMENT (OUTPATIENT)
Dept: RADIOLOGY | Facility: MEDICAL CENTER | Age: 57
End: 2017-02-15
Attending: EMERGENCY MEDICINE
Payer: MEDICAID

## 2017-02-15 VITALS
RESPIRATION RATE: 14 BRPM | BODY MASS INDEX: 27.64 KG/M2 | OXYGEN SATURATION: 96 % | WEIGHT: 172 LBS | HEART RATE: 98 BPM | HEIGHT: 66 IN | SYSTOLIC BLOOD PRESSURE: 114 MMHG | TEMPERATURE: 97.7 F | DIASTOLIC BLOOD PRESSURE: 74 MMHG

## 2017-02-15 DIAGNOSIS — F10.921 ALCOHOL INTOXICATION, WITH DELIRIUM (HCC): ICD-10-CM

## 2017-02-15 DIAGNOSIS — J06.9 UPPER RESPIRATORY TRACT INFECTION, UNSPECIFIED TYPE: ICD-10-CM

## 2017-02-15 DIAGNOSIS — G89.29 CHRONIC MIDLINE LOW BACK PAIN, WITH SCIATICA PRESENCE UNSPECIFIED: ICD-10-CM

## 2017-02-15 DIAGNOSIS — M54.5 CHRONIC MIDLINE LOW BACK PAIN, WITH SCIATICA PRESENCE UNSPECIFIED: ICD-10-CM

## 2017-02-15 LAB — POC BREATHALIZER: 0.21 PERCENT (ref 0–0.01)

## 2017-02-15 PROCEDURE — 304561 HCHG STAT O2

## 2017-02-15 PROCEDURE — A9270 NON-COVERED ITEM OR SERVICE: HCPCS | Performed by: EMERGENCY MEDICINE

## 2017-02-15 PROCEDURE — 302970 POC BREATHALIZER: Performed by: EMERGENCY MEDICINE

## 2017-02-15 PROCEDURE — 700101 HCHG RX REV CODE 250: Performed by: EMERGENCY MEDICINE

## 2017-02-15 PROCEDURE — 94640 AIRWAY INHALATION TREATMENT: CPT

## 2017-02-15 PROCEDURE — 304562 HCHG STAT O2 MASK/CANNULA

## 2017-02-15 PROCEDURE — 99285 EMERGENCY DEPT VISIT HI MDM: CPT

## 2017-02-15 PROCEDURE — 94760 N-INVAS EAR/PLS OXIMETRY 1: CPT

## 2017-02-15 PROCEDURE — 700102 HCHG RX REV CODE 250 W/ 637 OVERRIDE(OP): Performed by: EMERGENCY MEDICINE

## 2017-02-15 PROCEDURE — 71010 DX-CHEST-PORTABLE (1 VIEW): CPT

## 2017-02-15 RX ORDER — DEXAMETHASONE 4 MG/1
8 TABLET ORAL ONCE
Status: COMPLETED | OUTPATIENT
Start: 2017-02-15 | End: 2017-02-15

## 2017-02-15 RX ORDER — LISINOPRIL 10 MG/1
10 TABLET ORAL
COMMUNITY
End: 2018-07-18

## 2017-02-15 RX ORDER — CARISOPRODOL 350 MG/1
350 TABLET ORAL EVERY 8 HOURS PRN
Qty: 20 TAB | Refills: 0 | Status: SHIPPED | OUTPATIENT
Start: 2017-02-15 | End: 2017-05-23

## 2017-02-15 RX ADMIN — IPRATROPIUM BROMIDE 0.5 MG: 0.5 SOLUTION RESPIRATORY (INHALATION) at 14:45

## 2017-02-15 RX ADMIN — DEXAMETHASONE 8 MG: 4 TABLET ORAL at 14:45

## 2017-02-15 RX ADMIN — ALBUTEROL SULFATE 2.5 MG: 2.5 SOLUTION RESPIRATORY (INHALATION) at 14:45

## 2017-02-15 ASSESSMENT — LIFESTYLE VARIABLES
HAVE PEOPLE ANNOYED YOU BY CRITICIZING YOUR DRINKING: NO
EVER HAD A DRINK FIRST THING IN THE MORNING TO STEADY YOUR NERVES TO GET RID OF A HANGOVER: NO
CONSUMPTION TOTAL: POSITIVE
HAVE YOU EVER FELT YOU SHOULD CUT DOWN ON YOUR DRINKING: NO
TOTAL SCORE: 0
TOTAL SCORE: 0
ON A TYPICAL DAY WHEN YOU DRINK ALCOHOL HOW MANY DRINKS DO YOU HAVE: 7
AVERAGE NUMBER OF DAYS PER WEEK YOU HAVE A DRINK CONTAINING ALCOHOL: 7
DO YOU DRINK ALCOHOL: YES
HOW MANY TIMES IN THE PAST YEAR HAVE YOU HAD 5 OR MORE DRINKS IN A DAY: 365
TOTAL SCORE: 0
EVER FELT BAD OR GUILTY ABOUT YOUR DRINKING: NO

## 2017-02-15 ASSESSMENT — PAIN SCALES - GENERAL: PAINLEVEL_OUTOF10: 0

## 2017-02-15 NOTE — ED AVS SNAPSHOT
2/15/2017          Ashleigh Marquis  [address] [place] [address] Street  [facility] [address]    Dear Ashleigh:    [facility] wants to ensure your discharge home is safe and you or your loved ones have had all your questions answered regarding your care after you leave the hospital.    You may receive a telephone call within two days of your discharge.  This call is to make certain you understand your discharge instructions as well as ensure we provided you with the best care possible during your stay with us.     The call will only last approximately 3-5 minutes and will be done by a nurse.    Once again, we want to ensure your discharge home is safe and that you have a clear understanding of any next steps in your care.  If you have any questions or concerns, please do not hesitate to contact us, we are here for you.  Thank you for choosing [facility] for your healthcare needs.    Sincerely,    David Ibrahim    [facility]

## 2017-02-15 NOTE — ED PROVIDER NOTES
"ED Provider Note    Scribed for Eduardo Eid M.D. by Juan M Pérez. 2/15/2017  2:16 PM    Primary care provider: [facility]  Means of arrival: Ambulance  History obtained from: Nurse   History limited by: Altered mental status    CHIEF COMPLAINT  Chief Complaint   Patient presents with   • ETOH Withdrawal     [place] and was to intoxicated to walk       HPI  Ashleigh Marquis is a 56 y.o. female who presents to the Emergency Department for ETOH withdrawal. Patient states she is here in the ED because cough for 2 weeks, swollen lymph nodes, and back pain.     HPI is limited due to altered mental status.     REVIEW OF SYSTEMS  ROS is limited secondary altered mental status. See HPI for further details.     PAST MEDICAL HISTORY   has a past medical history of Hypertension; Vitamin D deficiency; Chronic low back pain; Muscle disorder; Anxiety; OSTEOPOROSIS; Arthritis; GERD (gastroesophageal reflux disease); Ulcer (CMS-[facility]); ASTHMA; HTN; Cancer (CMS-[facility]) (1981); Renal disorder; Indigestion; Congestive heart failure (CMS-HCC); Psychiatric disorder; PTSD (post-traumatic stress disorder); Depression; Seizure (CMS-HCC); Other specified symptom associated with female genital organs; Fall; Alcoholism (CMS-[facility]); and EtOH dependence (CMS-HCC).    SURGICAL HISTORY   has past surgical history that includes hernia repair (1977); cysto stent placemnt pre surg (10/7/2010); cystoscopy stent placement (11/9/2010); ureteroscopy (11/9/2010); lasertripsy (11/9/2010); and stent removal (11/9/2010).    SOCIAL HISTORY  Social History   Substance Use Topics   • Smoking status: Current Every Day Smoker -- 0.50 packs/day for 20 years     Types: Cigarettes   • Smokeless tobacco: Never Used      Comment: 1/2 pack per day   • Alcohol Use: Yes      Comment: \"lots of vodka\"      History   Drug Use No       FAMILY HISTORY  Family History   Problem Relation Age of Onset   • Heart Disease Father    • " "Hypertension Father    • Diabetes Father    • Cancer Paternal Grandmother    • Depression Other    • Lung Disease Neg Hx    • Stroke Neg Hx        CURRENT MEDICATIONS  Home Medications     **Home medications have not yet been reviewed for this encounter**          ALLERGIES  Allergies   Allergen Reactions   • Sulfa Drugs Vomiting   • Toradol Vomiting   • Neurontin [Gabapentin]      Bowel incontinence         PHYSICAL EXAM  VITAL SIGNS: /74 mmHg  Pulse 90  Temp(Src) 36.5 °C (97.7 °F)  Resp 16  Ht 1.676 m (5' 6\")  Wt 78.019 kg (172 lb)  BMI 27.77 kg/m2  SpO2 95%    Constitutional: Disheveled.   HENT: Normocephalic, Atraumatic, Bilateral external ears normal, Oropharynx moist, No oral exudates.   Eyes: PERRLA, EOMI, Conjunctiva normal, No discharge.   Neck: No tenderness, Supple, No stridor.   Lymphatic: No lymphadenopathy noted.   Cardiovascular: Normal heart rate, Normal rhythm.   Thorax & Lungs: Clear to auscultation bilaterally, Prolonged expiration. No respiratory distress, No wheezing, No crackles.   Abdomen: Soft, No tenderness, No masses, No pulsatile masses.   Skin: Warm, Dry, No erythema, No rash.   Extremities:, No edema No cyanosis.   Musculoskeletal: No tenderness to palpation or major deformities noted.  Intact distal pulses  Neurologic: Awake, alert. Slurred speech. Appears intoxicated. Moves all extremities spontaneously.  Psychiatric: Affect normal, Judgment normal, Mood normal.     LABS  Labs Reviewed   POC BREATHALIZER - Abnormal; Notable for the following:     POC Breathalizer 0.213 (*)     All other components within normal limits     All labs reviewed by me.    RADIOLOGY  DX-CHEST-PORTABLE (1 VIEW)   Final Result      No acute cardiopulmonary disease evident.        The radiologist's interpretation of all radiological studies have been reviewed by me.    COURSE & MEDICAL DECISION MAKING  Pertinent Labs & Imaging studies reviewed. (See chart for details)    I reviewed the patient's " medical records which showed history of alcohol abuse. She was here 11 days ago due to alcohol and fall. CT head and CT Cspine were negative.      2:16 PM - Patient seen and examined at bedside. Patient will be treated with PROVENTIL 2.6mg nebulizer, ATROVENT 0.5mg nebulizer, DECADRON 8mg PO. Ordered DX-chest, POC breathalizer to evaluate her symptoms.     Decision Making:  Patient coming in with alcohol intoxication, the patient is at multiple visits for alcohol intoxication, the patient is also complaining of URI-type symptoms, runny nose, swollen glands, she has minimal anterior cervical lymphadenopathy on my examination. She is asking for Soma, give the patient a dose of Decadron, breathing treatment, chest x-ray, the patient was monitored here until she was clinically sober, we'll be discharged home with some Soma, have the patient follow up with her primary care physician.     The patient will return for new or worsening symptoms and is stable at the time of discharge.    The patient is referred to a primary physician for blood pressure management, diabetic screening, and for all other preventative health concerns.    DISPOSITION:  Patient will be discharged home in stable condition.    FOLLOW UP:  [facility], Emergency Dept  [address] [facility] [address] [phone]    If symptoms worsen    [facility]  [address] [place] [address] [id number]  [address]  [facility] [address]  [phone]            OUTPATIENT MEDICATIONS:  New Prescriptions    CARISOPRODOL (SOMA) 350 MG TAB    Take 1 Tab by mouth every 8 hours as needed for Muscle Spasms.           FINAL IMPRESSION  1. Upper respiratory tract infection, unspecified type    2. Chronic midline low back pain, with sciatica presence unspecified    3. Alcohol intoxication, with delirium (CMS-[facility])          I, Juan M Pérez (Scribe), am scribing for, and in the presence of, Eduardo Eid M.D..    Electronically signed by: Juan M Pérez (Scribroger),  2/15/2017    I, Eduardo Eid M.D. personally performed the services described in this documentation, as scribed by Juan M Pérez in my presence, and it is both accurate and complete.    The note accurately reflects work and decisions made by me.  Eduardo Eid  2/15/2017  5:54 PM

## 2017-02-15 NOTE — ED AVS SNAPSHOT
Home Care Instructions                                                                                                                Ashleigh Marquis   MRN: [id number]    Department:  [facility], Emergency Dept   Date of Visit:  2/15/2017            [facility], Emergency Dept    [address] [place] [id number]    Phone:  [phone]      You were seen by     1. Vanessa Ojeda M.D.    2. Eduardo Eid M.D.      Your Diagnosis Was     Upper respiratory tract infection, unspecified type     J06.9       These are the medications you received during your hospitalization from 02/15/2017 1346 to 02/15/2017 1946     Date/Time Order Dose Route Action    02/15/2017 1445 albuterol (PROVENTIL) 2.5mg/0.5ml nebulizer solution 2.5 mg 2.5 mg Inhalation Given    02/15/2017 1445 ipratropium (ATROVENT) 0.02 % nebulizer solution 0.5 mg 0.5 mg Nebulization Given    02/15/2017 1445 dexamethasone (DECADRON) tablet 8 mg 8 mg Oral Given      Follow-up Information     1. Follow up with [facility], Emergency Dept.    Specialty:  Emergency Medicine    Why:  If symptoms worsen    Contact information    [address] [place] [id number] [phone]        2. Follow up with [facility].    Specialty:  Family Medicine    Contact information    [address]  [address]  [facility] [address] [phone]        Medication Information     Review all of your home medications and newly ordered medications with your primary doctor and/or pharmacist as soon as possible. Follow medication instructions as directed by your doctor and/or pharmacist.     Please keep your complete medication list with you and share with your physician. Update the information when medications are discontinued, doses are changed, or new medications (including over-the-counter products) are added; and carry medication information at all times in the event of emergency situations.                "  Medication List      START taking these medications        Instructions    carisoprodol 350 MG Tabs   Commonly known as:  SOMA    Take 1 Tab by mouth every 8 hours as needed for Muscle Spasms.   Dose:  350 mg         ASK your doctor about these medications        Instructions    lisinopril 10 MG Tabs   Commonly known as:  PRINIVIL    Take 10 mg by mouth.   Dose:  10 mg               Procedures and tests performed during your visit     DX-CHEST-PORTABLE (1 VIEW)    POC BREATHALIZER    SMALL VOLUME NEBULIZER        Discharge Instructions       Alcohol Intoxication  Alcohol intoxication occurs when the amount of alcohol that a person has consumed impairs his or her ability to mentally and physically function. Alcohol directly impairs the normal chemical activity of the brain. Drinking large amounts of alcohol can lead to changes in mental function and behavior, and it can cause many physical effects that can be harmful.   Alcohol intoxication can range in severity from mild to very severe. Various factors can affect the level of intoxication that occurs, such as the person's age, gender, weight, frequency of alcohol consumption, and the presence of other medical conditions (such as diabetes, seizures, or heart conditions). Dangerous levels of alcohol intoxication may occur when people drink large amounts of alcohol in a short period (binge drinking). Alcohol can also be especially dangerous when combined with certain prescription medicines or \"recreational\" drugs.  SIGNS AND SYMPTOMS  Some common signs and symptoms of mild alcohol intoxication include:  · Loss of coordination.  · Changes in mood and behavior.  · Impaired judgment.  · Slurred speech.  As alcohol intoxication progresses to more severe levels, other signs and symptoms will appear. These may include:  · Vomiting.  · Confusion and impaired memory.  · Slowed breathing.  · Seizures.  · Loss of consciousness.  DIAGNOSIS   Your health care provider will take " a medical history and perform a physical exam. You will be asked about the amount and type of alcohol you have consumed. Blood tests will be done to measure the concentration of alcohol in your blood. In many places, your blood alcohol level must be lower than 80 mg/dL (0.08%) to legally drive. However, many dangerous effects of alcohol can occur at much lower levels.   TREATMENT   People with alcohol intoxication often do not require treatment. Most of the effects of alcohol intoxication are temporary, and they go away as the alcohol naturally leaves the body. Your health care provider will monitor your condition until you are stable enough to go home. Fluids are sometimes given through an IV access tube to help prevent dehydration.   HOME CARE INSTRUCTIONS  · Do not drive after drinking alcohol.  · Stay hydrated. Drink enough water and fluids to keep your urine clear or pale yellow. Avoid caffeine.    · Only take over-the-counter or prescription medicines as directed by your health care provider.    SEEK MEDICAL CARE IF:   · You have persistent vomiting.    · You do not feel better after a few days.  · You have frequent alcohol intoxication. Your health care provider can help determine if you should see a substance use treatment counselor.  SEEK IMMEDIATE MEDICAL CARE IF:   · You become shaky or tremble when you try to stop drinking.    · You shake uncontrollably (seizure).    · You throw up (vomit) blood. This may be bright red or may look like black coffee grounds.    · You have blood in your stool. This may be bright red or may appear as a black, tarry, bad smelling stool.    · You become lightheaded or faint.    MAKE SURE YOU:   · Understand these instructions.  · Will watch your condition.  · Will get help right away if you are not doing well or get worse.     This information is not intended to replace advice given to you by your health care provider. Make sure you discuss any questions you have with your  health care provider.     Document Released: 09/27/2006 Document Revised: 08/20/2014 Document Reviewed: 05/23/2014  [employer] Interactive Patient Education ©2016 [employer]      Back Pain, Adult  Back pain is very common in adults. The cause of back pain is rarely dangerous and the pain often gets better over time. The cause of your back pain may not be known. Some common causes of back pain include:  · Strain of the muscles or ligaments supporting the spine.  · Wear and tear (degeneration) of the spinal disks.  · Arthritis.  · Direct injury to the back.  For many people, back pain may return. Since back pain is rarely dangerous, most people can learn to manage this condition on their own.  HOME CARE INSTRUCTIONS  Watch your back pain for any changes. The following actions may help to lessen any discomfort you are feeling:  · Remain active. It is stressful on your back to sit or  one place for long periods of time. Do not sit, drive, or  one place for more than 30 minutes at a time. Take short walks on even surfaces as soon as you are able. Try to increase the length of time you walk each day.  · Exercise regularly as directed by your health care provider. Exercise helps your back heal faster. It also helps avoid future injury by keeping your muscles strong and flexible.  · Do not stay in bed. Resting more than 1-2 days can delay your recovery.  · Pay attention to your body when you bend and lift. The most comfortable positions are those that put less stress on your recovering back. Always use proper lifting techniques, including:  ¨ Bending your knees.  ¨ Keeping the load close to your body.  ¨ Avoiding twisting.  · Find a comfortable position to sleep. Use a firm mattress and lie on your side with your knees slightly bent. If you lie on your back, put a pillow under your knees.  · Avoid feeling anxious or stressed. Stress increases muscle tension and can worsen back pain. It is important to  recognize when you are anxious or stressed and learn ways to manage it, such as with exercise.  · Take medicines only as directed by your health care provider. Over-the-counter medicines to reduce pain and inflammation are often the most helpful. Your health care provider may prescribe muscle relaxant drugs. These medicines help dull your pain so you can more quickly return to your normal activities and healthy exercise.  · Apply ice to the injured area:  ¨ Put ice in a plastic bag.  ¨ Place a towel between your skin and the bag.  ¨ Leave the ice on for 20 minutes, 2-3 times a day for the first 2-3 days. After that, ice and heat may be alternated to reduce pain and spasms.  · Maintain a healthy weight. Excess weight puts extra stress on your back and makes it difficult to maintain good posture.  SEEK MEDICAL CARE IF:  · You have pain that is not relieved with rest or medicine.  · You have increasing pain going down into the legs or buttocks.  · You have pain that does not improve in one week.  · You have night pain.  · You lose weight.  · You have a fever or chills.  SEEK IMMEDIATE MEDICAL CARE IF:   · You develop new bowel or bladder control problems.  · You have unusual weakness or numbness in your arms or legs.  · You develop nausea or vomiting.  · You develop abdominal pain.  · You feel faint.     This information is not intended to replace advice given to you by your health care provider. Make sure you discuss any questions you have with your health care provider.     Document Released: 12/18/2006 Document Revised: 01/08/2016 Document Reviewed: 04/21/2015  [employer] Interactive Patient Education ©2016 [employer]              Patient Information     Patient Information    Following emergency treatment: all patient requiring follow-up care must return either to a private physician or a clinic if your condition worsens before you are able to obtain further medical attention, please return to the emergency room.       Billing Information    At [facility], we work to make the billing process streamlined for our patients.  Our Representatives are here to answer any questions you may have regarding your hospital bill.  If you have insurance coverage and have supplied your insurance information to us, we will submit a claim to your insurer on your behalf.  Should you have any questions regarding your bill, we can be reached online or by phone as follows:  Online: You are able pay your bills online or live chat with our representatives about any billing questions you may have. We are here to help Monday - Friday from 8:00am to 7:30pm and 9:00am - 12:00pm on Saturdays.  Please visit [email or web address]  for more information.   Phone:  [phone] or [phone]    Please note that your emergency physician, surgeon, pathologist, radiologist, anesthesiologist, and other specialists are not employed by [facility] and will therefore bill separately for their services.  Please contact them directly for any questions concerning their bills at the numbers below:     Emergency Physician Services:  [phone]  [facility]:  [phone]  [facility]:  [phone]  [facility]:  [phone]    1. Your final bill may vary from the amount quoted upon discharge if all procedures are not complete at that time, or if your doctor has additional procedures of which we are not aware. You will receive an additional bill if you return to the Emergency Department at [facility] for suture removal regardless of the facility of which the sutures were placed.     2. Please arrange for settlement of this account at the emergency registration.    3. All self-pay accounts are due in full at the time of treatment.  If you are unable to meet this obligation then payment is expected within 4-5 days.     4. If you have had radiology studies (CT, X-ray, Ultrasound, MRI),  you have received a preliminary result during your emergency department visit. Please contact the radiology department [phone] to receive a copy of your final result. Please discuss the Final result with your primary physician or with the follow up physician provided.     Crisis Hotline:  [place] Crisis Hotline:  3-663-MRKLPBX or 1-390.884.5660  [place] Crisis Hotline:    1-553.724.9570 or 164-768-7714         ED Discharge Follow Up Questions    1. In order to provide you with very good care, we would like to follow up with a phone call in the next few days.  May we have your permission to contact you?     YES /  NO    2. What is the best phone number to call you? (       )_____-__________    3. What is the best time to call you?      Morning  /  Afternoon  /  Evening                   Patient Signature:  ____________________________________________________________    Date:  ____________________________________________________________

## 2017-02-15 NOTE — FLOWSHEET NOTE
02/15/17 1517   Events/Summary/Plan   Events/Summary/Plan SVN   Non-Invasive Resp Device Site Inspection Completed Intact   Interdisciplinary Plan of Care-Outcomes    Bronchodilator Outcome Patient at Stable Baseline   Education   Education Yes - Pt. / Family has been Instructed in use of Respiratory Equipment;Yes - Pt. / Family has been Instructed in use of Respiratory Medications and Adverse Reactions   SVN Group   #SVN Performed Yes   Given By: Mouthpiece   Date SVN Last Changed 02/15/17   Date SVN Next Change Due (Q 7 Days) 02/22/17   Respiratory WDL   Respiratory (WDL) X   Chest Exam   Respiration 14   Pulse 69   Oximetry   #Pulse Oximetry (Single Determination) Yes   Oxygen   Pulse Oximetry 94 %   O2 (LPM) 0   O2 (FiO2) 21   O2 Daily Delivery Respiratory  Room Air with O2 Available

## 2017-02-15 NOTE — ED AVS SNAPSHOT
AgeCheq Access Code: Activation code not generated  Current AgeCheq Status: Patient Declined    Your email address is not on file at [facility].  Email Addresses are required for you to sign up for AgeCheq, please contact [phone] to verify your personal information and to provide your email address prior to attempting to register for AgeCheq.    Ashleigh Marquis  [address] [place] [address]    AgeCheq  A secure, online tool to manage your health information     [facility]’s AgeCheq® is a secure, online tool that connects you to your personalized health information from the privacy of your home -- day or night - making it very easy for you to manage your healthcare. Once the activation process is completed, you can even access your medical information using the AgeCheq kiel, which is available for free in the Apple Kiel store or Google Play store.     To learn more about AgeCheq, visit [email or web address]    There are two levels of access available (as shown below):   My Chart Features  [facility] Primary Care Doctor [facility]  Specialists [facility]  Urgent  Care Non-[facility] Primary Care Doctor   Email your healthcare team securely and privately 24/7 X X X    Manage appointments: schedule your next appointment; view details of past/upcoming appointments X      Request prescription refills. X      View recent personal medical records, including lab and immunizations X X X X   View health record, including health history, allergies, medications X X X X   Read reports about your outpatient visits, procedures, consult and ER notes X X X X   See your discharge summary, which is a recap of your hospital and/or ER visit that includes your diagnosis, lab results, and care plan X X  X     How to register for Yu Rongt:  Once your e-mail address has been verified, follow the following steps to sign up for Yu [employer]ongt.     1. Go to  [email or web address]  2. Click on the Sign Up Now box, which takes you to the New  Member Sign Up page. You will need to provide the following information:  a. Enter your [employer] Access Code exactly as it appears at the top of this page. (You will not need to use this code after you’ve completed the sign-up process. If you do not sign up before the expiration date, you must request a new code.)   b. Enter your date of birth.   c. Enter your home email address.   d. Click Submit, and follow the next screen’s instructions.  3. Create a [employer] ID. This will be your [employer] login ID and cannot be changed, so think of one that is secure and easy to remember.  4. Create a [employer] password. You can change your password at any time.  5. Enter your Password Reset Question and Answer. This can be used at a later time if you forget your password.   6. Enter your e-mail address. This allows you to receive e-mail notifications when new information is available in [employer].  7. Click Sign Up. You can now view your health information.    For assistance activating your [employer] account, call [phone]

## 2017-02-15 NOTE — DISCHARGE PLANNING
38th ER visit within the year for ETOH.  No further assistance for meds, travel, detox will be given, no cab, no bus pass.  When medically cleared may self present to [facility] if she desires.

## 2017-02-16 NOTE — DISCHARGE INSTRUCTIONS
"Alcohol Intoxication  Alcohol intoxication occurs when the amount of alcohol that a person has consumed impairs his or her ability to mentally and physically function. Alcohol directly impairs the normal chemical activity of the brain. Drinking large amounts of alcohol can lead to changes in mental function and behavior, and it can cause many physical effects that can be harmful.   Alcohol intoxication can range in severity from mild to very severe. Various factors can affect the level of intoxication that occurs, such as the person's age, gender, weight, frequency of alcohol consumption, and the presence of other medical conditions (such as diabetes, seizures, or heart conditions). Dangerous levels of alcohol intoxication may occur when people drink large amounts of alcohol in a short period (binge drinking). Alcohol can also be especially dangerous when combined with certain prescription medicines or \"recreational\" drugs.  SIGNS AND SYMPTOMS  Some common signs and symptoms of mild alcohol intoxication include:  · Loss of coordination.  · Changes in mood and behavior.  · Impaired judgment.  · Slurred speech.  As alcohol intoxication progresses to more severe levels, other signs and symptoms will appear. These may include:  · Vomiting.  · Confusion and impaired memory.  · Slowed breathing.  · Seizures.  · Loss of consciousness.  DIAGNOSIS   Your health care provider will take a medical history and perform a physical exam. You will be asked about the amount and type of alcohol you have consumed. Blood tests will be done to measure the concentration of alcohol in your blood. In many places, your blood alcohol level must be lower than 80 mg/dL (0.08%) to legally drive. However, many dangerous effects of alcohol can occur at much lower levels.   TREATMENT   People with alcohol intoxication often do not require treatment. Most of the effects of alcohol intoxication are temporary, and they go away as the alcohol naturally " leaves the body. Your health care provider will monitor your condition until you are stable enough to go home. Fluids are sometimes given through an IV access tube to help prevent dehydration.   HOME CARE INSTRUCTIONS  · Do not drive after drinking alcohol.  · Stay hydrated. Drink enough water and fluids to keep your urine clear or pale yellow. Avoid caffeine.    · Only take over-the-counter or prescription medicines as directed by your health care provider.    SEEK MEDICAL CARE IF:   · You have persistent vomiting.    · You do not feel better after a few days.  · You have frequent alcohol intoxication. Your health care provider can help determine if you should see a substance use treatment counselor.  SEEK IMMEDIATE MEDICAL CARE IF:   · You become shaky or tremble when you try to stop drinking.    · You shake uncontrollably (seizure).    · You throw up (vomit) blood. This may be bright red or may look like black coffee grounds.    · You have blood in your stool. This may be bright red or may appear as a black, tarry, bad smelling stool.    · You become lightheaded or faint.    MAKE SURE YOU:   · Understand these instructions.  · Will watch your condition.  · Will get help right away if you are not doing well or get worse.     This information is not intended to replace advice given to you by your health care provider. Make sure you discuss any questions you have with your health care provider.     Document Released: 09/27/2006 Document Revised: 08/20/2014 Document Reviewed: 05/23/2014  [employer] Patient Education ©2016 [employer]      Back Pain, Adult  Back pain is very common in adults. The cause of back pain is rarely dangerous and the pain often gets better over time. The cause of your back pain may not be known. Some common causes of back pain include:  · Strain of the muscles or ligaments supporting the spine.  · Wear and tear (degeneration) of the spinal disks.  · Arthritis.  · Direct injury to  the back.  For many people, back pain may return. Since back pain is rarely dangerous, most people can learn to manage this condition on their own.  HOME CARE INSTRUCTIONS  Watch your back pain for any changes. The following actions may help to lessen any discomfort you are feeling:  · Remain active. It is stressful on your back to sit or  one place for long periods of time. Do not sit, drive, or  one place for more than 30 minutes at a time. Take short walks on even surfaces as soon as you are able. Try to increase the length of time you walk each day.  · Exercise regularly as directed by your health care provider. Exercise helps your back heal faster. It also helps avoid future injury by keeping your muscles strong and flexible.  · Do not stay in bed. Resting more than 1-2 days can delay your recovery.  · Pay attention to your body when you bend and lift. The most comfortable positions are those that put less stress on your recovering back. Always use proper lifting techniques, including:  ¨ Bending your knees.  ¨ Keeping the load close to your body.  ¨ Avoiding twisting.  · Find a comfortable position to sleep. Use a firm mattress and lie on your side with your knees slightly bent. If you lie on your back, put a pillow under your knees.  · Avoid feeling anxious or stressed. Stress increases muscle tension and can worsen back pain. It is important to recognize when you are anxious or stressed and learn ways to manage it, such as with exercise.  · Take medicines only as directed by your health care provider. Over-the-counter medicines to reduce pain and inflammation are often the most helpful. Your health care provider may prescribe muscle relaxant drugs. These medicines help dull your pain so you can more quickly return to your normal activities and healthy exercise.  · Apply ice to the injured area:  ¨ Put ice in a plastic bag.  ¨ Place a towel between your skin and the bag.  ¨ Leave the ice on  for 20 minutes, 2-3 times a day for the first 2-3 days. After that, ice and heat may be alternated to reduce pain and spasms.  · Maintain a healthy weight. Excess weight puts extra stress on your back and makes it difficult to maintain good posture.  SEEK MEDICAL CARE IF:  · You have pain that is not relieved with rest or medicine.  · You have increasing pain going down into the legs or buttocks.  · You have pain that does not improve in one week.  · You have night pain.  · You lose weight.  · You have a fever or chills.  SEEK IMMEDIATE MEDICAL CARE IF:   · You develop new bowel or bladder control problems.  · You have unusual weakness or numbness in your arms or legs.  · You develop nausea or vomiting.  · You develop abdominal pain.  · You feel faint.     This information is not intended to replace advice given to you by your health care provider. Make sure you discuss any questions you have with your health care provider.     Document Released: 12/18/2006 Document Revised: 01/08/2016 Document Reviewed: 04/21/2015  [employer] Interactive Patient Education ©2016 [employer]

## 2017-02-16 NOTE — ED NOTES
Report received. Pt sleeping comfortably on gurchastity. Per Day RN, pt still unsteady on her feet and intoxicated. Assessment completed. Respirations are even and unlabored. NAD. Bed in lowest position, call light within reach. Pt with no further needs at this time.

## 2017-02-16 NOTE — ED NOTES
Pt up to ambulate to bathroom with steady gait.   Pt given discharge instructions, follow up care, and prescriptions. Pt verbalized understanding. RN to answer and questions pt and family had. Pt instructed not to drive or operate heavy machinery after drinking alcohol. VSS. Pt ambulated out to front lobby.

## 2017-02-19 ENCOUNTER — HOSPITAL ENCOUNTER (EMERGENCY)
Facility: MEDICAL CENTER | Age: 57
End: 2017-02-19
Attending: EMERGENCY MEDICINE
Payer: MEDICAID

## 2017-02-19 VITALS
RESPIRATION RATE: 14 BRPM | WEIGHT: 171.96 LBS | DIASTOLIC BLOOD PRESSURE: 80 MMHG | SYSTOLIC BLOOD PRESSURE: 140 MMHG | OXYGEN SATURATION: 99 % | TEMPERATURE: 96.7 F | HEIGHT: 65 IN | HEART RATE: 70 BPM | BODY MASS INDEX: 28.65 KG/M2

## 2017-02-19 DIAGNOSIS — F10.10 ALCOHOL ABUSE: ICD-10-CM

## 2017-02-19 DIAGNOSIS — R11.2 NON-INTRACTABLE VOMITING WITH NAUSEA, UNSPECIFIED VOMITING TYPE: ICD-10-CM

## 2017-02-19 LAB — GLUCOSE BLD-MCNC: 84 MG/DL (ref 65–99)

## 2017-02-19 PROCEDURE — 99284 EMERGENCY DEPT VISIT MOD MDM: CPT

## 2017-02-19 PROCEDURE — 700111 HCHG RX REV CODE 636 W/ 250 OVERRIDE (IP): Performed by: EMERGENCY MEDICINE

## 2017-02-19 PROCEDURE — 82962 GLUCOSE BLOOD TEST: CPT

## 2017-02-19 RX ORDER — ONDANSETRON 4 MG/1
4 TABLET, ORALLY DISINTEGRATING ORAL ONCE
Status: COMPLETED | OUTPATIENT
Start: 2017-02-19 | End: 2017-02-19

## 2017-02-19 RX ADMIN — ONDANSETRON 4 MG: 4 TABLET, ORALLY DISINTEGRATING ORAL at 05:52

## 2017-02-19 ASSESSMENT — PAIN SCALES - GENERAL: PAINLEVEL_OUTOF10: 0

## 2017-02-19 NOTE — ED NOTES
Patient to ED triage via EMS. Primary complaint if feeling cold. When asked to what her medical complaint is she has multiple generalized complaint about feeling sick. She also c/o of eye pain. She cannot tell RN specific symptoms of any kind. She does admit to ETOH. FSBS 88.     Pt educated on ED process and asked to wait in lobby until appropriate bed assignment can be made. Patient educated on importance of alerting staff to new or worsening symptoms or concerns.

## 2017-02-19 NOTE — ED NOTES
Pt v/u and agreement with D/C instructions, copy given to pt, signed copy placed in chart,agrees to f/u with ED or PMA if return of or worsening symptoms in any way. Pt denies pain, VSS.

## 2017-02-19 NOTE — PROGRESS NOTES
Pt standing at bedside changing into gown, ambulating in room without difficulty, denies needs at this time, will monitor.

## 2017-02-19 NOTE — DISCHARGE INSTRUCTIONS
Please go to a shelter if you do not have a warm place to stay. Please follow-up with Alcoholics Anonymous or a detox center for assistance with alcohol cessation. Please return to the emergency department if he has any new or worsening symptoms.      Alcohol Problems  Most adults who drink alcohol drink in moderation (not a lot) are at low risk for developing problems related to their drinking. However, all drinkers, including low-risk drinkers, should know about the health risks connected with drinking alcohol.  RECOMMENDATIONS FOR LOW-RISK DRINKING   Drink in moderation. Moderate drinking is defined as follows:   · Men - no more than 2 drinks per day.  · Nonpregnant women - no more than 1 drink per day.  · Over age 65 - no more than 1 drink per day.  A standard drink is 12 grams of pure alcohol, which is equal to a 12 ounce bottle of beer or wine cooler, a 5 ounce glass of wine, or 1.5 ounces of distilled spirits (such as whiskey, gama, vodka, or rum).   ABSTAIN FROM (DO NOT DRINK) ALCOHOL:  · When pregnant or considering pregnancy.  · When taking a medication that interacts with alcohol.  · If you are alcohol dependent.  · A medical condition that prohibits drinking alcohol (such as ulcer, liver disease, or heart disease).  DISCUSS WITH YOUR CAREGIVER:  · If you are at risk for coronary heart disease, discuss the potential benefits and risks of alcohol use: Light to moderate drinking is associated with lower rates of coronary heart disease in certain populations (for example, men over age 45 and postmenopausal women). Infrequent or nondrinkers are advised not to begin light to moderate drinking to reduce the risk of coronary heart disease so as to avoid creating an alcohol-related problem. Similar protective effects can likely be gained through proper diet and exercise.  · Women and the elderly have smaller amounts of body water than men. As a result women and the elderly achieve a higher blood alcohol  concentration after drinking the same amount of alcohol.  · Exposing a fetus to alcohol can cause a broad range of birth defects referred to as Fetal Alcohol Syndrome (FAS) or Alcohol-Related Birth Defects (ARBD). Although FAS/ARBD is connected with excessive alcohol consumption during pregnancy, studies also have reported neurobehavioral problems in infants born to mothers reporting drinking an average of 1 drink per day during pregnancy.  · Heavier drinking (the consumption of more than 4 drinks per occasion by men and more than 3 drinks per occasion by women) impairs learning (cognitive) and psychomotor functions and increases the risk of alcohol-related problems, including accidents and injuries.  CAGE QUESTIONS:   · Have you ever felt that you should Cut down on your drinking?  · Have people Annoyed you by criticizing your drinking?  · Have you ever felt bad or Guilty about your drinking?  · Have you ever had a drink first thing in the morning to steady your nerves or get rid of a hangover (Eye opener)?  If you answered positively to any of these questions: You may be at risk for alcohol-related problems if alcohol consumption is:   · Men: Greater than 14 drinks per week or more than 4 drinks per occasion.  · Women: Greater than 7 drinks per week or more than 3 drinks per occasion.  Do you or your family have a medical history of alcohol-related problems, such as:  · Blackouts.  · Sexual dysfunction.  · Depression.  · Trauma.  · Liver dysfunction.  · Sleep disorders.  · Hypertension.  · Chronic abdominal pain.  · Has your drinking ever caused you problems, such as problems with your family, problems with your work (or school) performance, or accidents/injuries?  · Do you have a compulsion to drink or a preoccupation with drinking?  · Do you have poor control or are you unable to stop drinking once you have started?  · Do you have to drink to avoid withdrawal symptoms?  · Do you have problems with withdrawal  such as tremors, nausea, sweats, or mood disturbances?  · Does it take more alcohol than in the past to get you high?  · Do you feel a strong urge to drink?  · Do you change your plans so that you can have a drink?  · Do you ever drink in the morning to relieve the shakes or a hangover?  If you have answered a number of the previous questions positively, it may be time for you to talk to your caregivers, family, and friends and see if they think you have a problem. Alcoholism is a chemical dependency that keeps getting worse and will eventually destroy your health and relationships. Many alcoholics end up dead, impoverished, or in shelter. This is often the end result of all chemical dependency.  · Do not be discouraged if you are not ready to take action immediately.  · Decisions to change behavior often involve up and down desires to change and feeling like you cannot decide.  · Try to think more seriously about your drinking behavior.  · Think of the reasons to quit.  WHERE TO GO FOR ADDITIONAL INFORMATION   · The National [place] on Alcohol Abuse and Alcoholism (NIAAA)  www.niaaa.nih.gov  · National [place] on Alcoholism and Drug Dependence (NCADD)  www.ncadd.org  · American Society of Addiction Medicine (ASAM)  www.asam.org   Document Released: 12/18/2006 Document Revised: 03/11/2013 Document Reviewed: 08/05/2009  [employer]® Patient Information ©2014 [employer], [employer].

## 2017-02-19 NOTE — ED AVS SNAPSHOT
Home Care Instructions                                                                                                                Ashleigh Marquis   MRN: [id number]    Department:  [facility], Emergency Dept   Date of Visit:  2/19/2017            [facility], Emergency Dept    [address] [address]    Phone:  [phone]      You were seen by     Alicia Alvarado M.D.      Your Diagnosis Was     Non-intractable vomiting with nausea, unspecified vomiting type     R11.2       These are the medications you received during your hospitalization from 02/19/2017 0448 to 02/19/2017 0646     Date/Time Order Dose Route Action    02/19/2017 0552 ondansetron (ZOFRAN ODT) dispertab 4 mg 4 mg Oral Given      Follow-up Information     1. Follow up with [facility]. Go in 2 days.    Specialty:  Family Medicine    Why:  For complete recheck    Contact information    [address]  [address]  [facility] [address]  [phone]          2. Go to [facility], Emergency Dept.    Specialty:  Emergency Medicine    Why:  If symptoms worsen    Contact information    [address] [place] [id number] [phone]      Medication Information     Review all of your home medications and newly ordered medications with your primary doctor and/or pharmacist as soon as possible. Follow medication instructions as directed by your doctor and/or pharmacist.     Please keep your complete medication list with you and share with your physician. Update the information when medications are discontinued, doses are changed, or new medications (including over-the-counter products) are added; and carry medication information at all times in the event of emergency situations.               Medication List      ASK your doctor about these medications        Instructions    carisoprodol 350 MG Tabs   Commonly known as:  SOMA    Take 1 Tab by mouth every 8 hours as needed for  Muscle Spasms.   Dose:  350 mg       lisinopril 10 MG Tabs   Commonly known as:  PRINIVIL    Take 10 mg by mouth.   Dose:  10 mg               Procedures and tests performed during your visit     ACCU-CHEK GLUCOSE        Discharge Instructions       Please go to a shelter if you do not have a warm place to stay. Please follow-up with Alcoholics Anonymous or a detox center for assistance with alcohol cessation. Please return to the emergency department if he has any new or worsening symptoms.      Alcohol Problems  Most adults who drink alcohol drink in moderation (not a lot) are at low risk for developing problems related to their drinking. However, all drinkers, including low-risk drinkers, should know about the health risks connected with drinking alcohol.  RECOMMENDATIONS FOR LOW-RISK DRINKING   Drink in moderation. Moderate drinking is defined as follows:   · Men - no more than 2 drinks per day.  · Nonpregnant women - no more than 1 drink per day.  · Over age 65 - no more than 1 drink per day.  A standard drink is 12 grams of pure alcohol, which is equal to a 12 ounce bottle of beer or wine cooler, a 5 ounce glass of wine, or 1.5 ounces of distilled spirits (such as whiskey, gama, vodka, or rum).   ABSTAIN FROM (DO NOT DRINK) ALCOHOL:  · When pregnant or considering pregnancy.  · When taking a medication that interacts with alcohol.  · If you are alcohol dependent.  · A medical condition that prohibits drinking alcohol (such as ulcer, liver disease, or heart disease).  DISCUSS WITH YOUR CAREGIVER:  · If you are at risk for coronary heart disease, discuss the potential benefits and risks of alcohol use: Light to moderate drinking is associated with lower rates of coronary heart disease in certain populations (for example, men over age 45 and postmenopausal women). Infrequent or nondrinkers are advised not to begin light to moderate drinking to reduce the risk of coronary heart disease so as to avoid creating an  alcohol-related problem. Similar protective effects can likely be gained through proper diet and exercise.  · Women and the elderly have smaller amounts of body water than men. As a result women and the elderly achieve a higher blood alcohol concentration after drinking the same amount of alcohol.  · Exposing a fetus to alcohol can cause a broad range of birth defects referred to as Fetal Alcohol Syndrome (FAS) or Alcohol-Related Birth Defects (ARBD). Although FAS/ARBD is connected with excessive alcohol consumption during pregnancy, studies also have reported neurobehavioral problems in infants born to mothers reporting drinking an average of 1 drink per day during pregnancy.  · Heavier drinking (the consumption of more than 4 drinks per occasion by men and more than 3 drinks per occasion by women) impairs learning (cognitive) and psychomotor functions and increases the risk of alcohol-related problems, including accidents and injuries.  CAGE QUESTIONS:   · Have you ever felt that you should Cut down on your drinking?  · Have people Annoyed you by criticizing your drinking?  · Have you ever felt bad or Guilty about your drinking?  · Have you ever had a drink first thing in the morning to steady your nerves or get rid of a hangover (Eye opener)?  If you answered positively to any of these questions: You may be at risk for alcohol-related problems if alcohol consumption is:   · Men: Greater than 14 drinks per week or more than 4 drinks per occasion.  · Women: Greater than 7 drinks per week or more than 3 drinks per occasion.  Do you or your family have a medical history of alcohol-related problems, such as:  · Blackouts.  · Sexual dysfunction.  · Depression.  · Trauma.  · Liver dysfunction.  · Sleep disorders.  · Hypertension.  · Chronic abdominal pain.  · Has your drinking ever caused you problems, such as problems with your family, problems with your work (or school) performance, or accidents/injuries?  · Do you  have a compulsion to drink or a preoccupation with drinking?  · Do you have poor control or are you unable to stop drinking once you have started?  · Do you have to drink to avoid withdrawal symptoms?  · Do you have problems with withdrawal such as tremors, nausea, sweats, or mood disturbances?  · Does it take more alcohol than in the past to get you high?  · Do you feel a strong urge to drink?  · Do you change your plans so that you can have a drink?  · Do you ever drink in the morning to relieve the shakes or a hangover?  If you have answered a number of the previous questions positively, it may be time for you to talk to your caregivers, family, and friends and see if they think you have a problem. Alcoholism is a chemical dependency that keeps getting worse and will eventually destroy your health and relationships. Many alcoholics end up dead, impoverished, or in longterm. This is often the end result of all chemical dependency.  · Do not be discouraged if you are not ready to take action immediately.  · Decisions to change behavior often involve up and down desires to change and feeling like you cannot decide.  · Try to think more seriously about your drinking behavior.  · Think of the reasons to quit.  WHERE TO GO FOR ADDITIONAL INFORMATION   · The National [place] on Alcohol Abuse and Alcoholism (NIAAA)  www.niaaa.nih.gov  · National [place] on Alcoholism and Drug Dependence (NCADD)  www.ncadd.org  · American Society of Addiction Medicine (ASAM)  www.asam.org   Document Released: 12/18/2006 Document Revised: 03/11/2013 Document Reviewed: 08/05/2009  ExitCare® Patient Information ©2014 [facility].            Patient Information     Patient Information    Following emergency treatment: all patient requiring follow-up care must return either to a private physician or a clinic if your condition worsens before you are able to obtain further medical attention, please return to the emergency room.     Billing  Information    At [facility], we work to make the billing process streamlined for our patients.  Our Representatives are here to answer any questions you may have regarding your hospital bill.  If you have insurance coverage and have supplied your insurance information to us, we will submit a claim to your insurer on your behalf.  Should you have any questions regarding your bill, we can be reached online or by phone as follows:  Online: You are able pay your bills online or live chat with our representatives about any billing questions you may have. We are here to help Monday - Friday from 8:00am to 7:30pm and 9:00am - 12:00pm on Saturdays.  Please visit [email or web address]  for more information.   Phone:  [phone] or [phone]    Please note that your emergency physician, surgeon, pathologist, radiologist, anesthesiologist, and other specialists are not employed by [facility] and will therefore bill separately for their services.  Please contact them directly for any questions concerning their bills at the numbers below:     Emergency Physician Services:  [phone]  [facility]:  [phone]  [facility]:  [phone]  [facility]:  [phone]    1. Your final bill may vary from the amount quoted upon discharge if all procedures are not complete at that time, or if your doctor has additional procedures of which we are not aware. You will receive an additional bill if you return to the Emergency Department at [facility] for suture removal regardless of the facility of which the sutures were placed.     2. Please arrange for settlement of this account at the emergency registration.    3. All self-pay accounts are due in full at the time of treatment.  If you are unable to meet this obligation then payment is expected within 4-5 days.     4. If you have had radiology studies (CT, X-ray, Ultrasound, MRI), you have  received a preliminary result during your emergency department visit. Please contact the radiology department [phone] to receive a copy of your final result. Please discuss the Final result with your primary physician or with the follow up physician provided.     Crisis Hotline:  Gay Crisis Hotline:  1-136-ATEFBEZ or 1-618.743.8994  [place] Crisis Hotline:    1-608.128.3453 or 704-574-0296         ED Discharge Follow Up Questions    1. In order to provide you with very good care, we would like to follow up with a phone call in the next few days.  May we have your permission to contact you?     YES /  NO    2. What is the best phone number to call you? (       )_____-__________    3. What is the best time to call you?      Morning  /  Afternoon  /  Evening                   Patient Signature:  ____________________________________________________________    Date:  ____________________________________________________________

## 2017-02-19 NOTE — ED AVS SNAPSHOT
2/19/2017          Ashleigh Marquis  [address] [place] [address] Street  [facility] [address]    Dear Ashleigh:    [facility] wants to ensure your discharge home is safe and you or your loved ones have had all your questions answered regarding your care after you leave the hospital.    You may receive a telephone call within two days of your discharge.  This call is to make certain you understand your discharge instructions as well as ensure we provided you with the best care possible during your stay with us.     The call will only last approximately 3-5 minutes and will be done by a nurse.    Once again, we want to ensure your discharge home is safe and that you have a clear understanding of any next steps in your care.  If you have any questions or concerns, please do not hesitate to contact us, we are here for you.  Thank you for choosing [facility] for your healthcare needs.    Sincerely,    David Ibrahim    [facility]

## 2017-02-19 NOTE — ED PROVIDER NOTES
"ED Provider Note    Chief Complaint:   Vomiting    HPI:  Ashleigh Marquis is a 56 y.o. female who presents with multiple complaints. States that she is cold because she is homeless. Also states that she has had vomiting. Initially unable to give time of onset or any further symptoms. When pressed she states that she has been vomiting all day. She has had no episodes of vomiting on arrival to the emergency department. Initially states she is unable to walk, however when I explained she would have to change into a gown to be seen she was able to easily stand to change clothes.     Review of Systems:  See HPI for pertinent positives and negatives.    Past Medical History:   has a past medical history of Hypertension; Vitamin D deficiency; Chronic low back pain; Muscle disorder; Anxiety; OSTEOPOROSIS; Arthritis; GERD (gastroesophageal reflux disease); Ulcer (CMS-HCC); ASTHMA; HTN; Cancer (CMS-[facility]) (1981); Renal disorder; Indigestion; Congestive heart failure (CMS-HCC); Psychiatric disorder; PTSD (post-traumatic stress disorder); Depression; Seizure (CMS-HCC); Other specified symptom associated with female genital organs; Fall; Alcoholism (CMS-HCC); and EtOH dependence (CMS-HCC).    Social History:  Social History     Social History Main Topics   • Smoking status: Current Every Day Smoker -- 0.50 packs/day for 20 years     Types: Cigarettes   • Smokeless tobacco: Never Used      Comment: 1/2 pack per day   • Alcohol Use: Yes      Comment: \"lots of vodka\"   • Drug Use: No   • Sexual Activity: No       Surgical History:   has past surgical history that includes hernia repair (1977); cysto stent placemnt pre surg (10/7/2010); cystoscopy stent placement (11/9/2010); ureteroscopy (11/9/2010); lasertripsy (11/9/2010); and stent removal (11/9/2010).    Current Medications:  Home Medications     **Home medications have not yet been reviewed for this encounter**          Allergies:  Allergies   Allergen Reactions   • Sulfa " "Drugs Vomiting   • Toradol Vomiting   • Neurontin [Gabapentin]      Bowel incontinence         Physical Exam:  Vital Signs: /80 mmHg  Pulse 70  Temp(Src) 35.9 °C (96.7 °F)  Resp 14  Ht 1.651 m (5' 5\")  Wt 78 kg (171 lb 15.3 oz)  BMI 28.62 kg/m2  SpO2 99%  Constitutional: Alert, no acute distress  HENT: Normocephalic, atraumatic  Eyes: Normal conjunctiva  Neck: Normal range of motion, no stridor  Pulmonary: No respiratory distress, normal work of breathing  Abdomen: Soft, nontender, nondistended  Musculoskeletal: Normal range of motion in all extremities, no swelling or deformity noted  Neurologic: Ambulating with steady gait    Labs:  Labs Reviewed   ACCU-CHEK GLUCOSE     MDM:  Patient presents with multiple complaints, however does appear to be here for a place to sleep. States she was vomiting earlier today, but has been drinking heavily. She also stated she was unable to walk, but is easily able to ambulate in the emergency department. Her primary complaint aside from being cold does appear to be vomiting. She was treated with Zofran and given food and fluids in the emergency department. She denied any episodes of vomiting, tolerated by mouth easily. Plan at this time is for discharge home, she may follow up with primary care clinic for complete recheck.    Blood pressure today is greater than 120/80, pt instructed to follow up with primary care for recheck    Disposition:  Discharged home in stable condition    Final Impression:  Vomiting, resolved  Alcohol abuse    Electronically signed by: Alicia Alvarado, 2/19/2017 5:47 AM      "

## 2017-03-09 ENCOUNTER — HOSPITAL ENCOUNTER (EMERGENCY)
Facility: MEDICAL CENTER | Age: 57
End: 2017-03-09
Attending: EMERGENCY MEDICINE
Payer: MEDICAID

## 2017-03-09 ENCOUNTER — APPOINTMENT (OUTPATIENT)
Dept: RADIOLOGY | Facility: MEDICAL CENTER | Age: 57
End: 2017-03-09
Attending: EMERGENCY MEDICINE
Payer: MEDICAID

## 2017-03-09 VITALS
RESPIRATION RATE: 16 BRPM | HEART RATE: 69 BPM | OXYGEN SATURATION: 97 % | WEIGHT: 162 LBS | SYSTOLIC BLOOD PRESSURE: 129 MMHG | HEIGHT: 65 IN | BODY MASS INDEX: 26.99 KG/M2 | TEMPERATURE: 98 F | DIASTOLIC BLOOD PRESSURE: 78 MMHG

## 2017-03-09 DIAGNOSIS — F10.929 ALCOHOL INTOXICATION, WITH UNSPECIFIED COMPLICATION (HCC): ICD-10-CM

## 2017-03-09 DIAGNOSIS — S80.211A KNEE ABRASION, RIGHT, INITIAL ENCOUNTER: ICD-10-CM

## 2017-03-09 PROCEDURE — 99284 EMERGENCY DEPT VISIT MOD MDM: CPT

## 2017-03-09 PROCEDURE — 73562 X-RAY EXAM OF KNEE 3: CPT | Mod: RT

## 2017-03-09 RX ORDER — CLONAZEPAM 1 MG/1
0.5 TABLET ORAL 2 TIMES DAILY
COMMUNITY
End: 2018-07-18

## 2017-03-09 RX ORDER — SPIRONOLACTONE 25 MG/1
25 TABLET ORAL DAILY
COMMUNITY
End: 2018-10-02

## 2017-03-09 RX ORDER — FLUOXETINE 10 MG/1
10 CAPSULE ORAL DAILY
COMMUNITY
End: 2018-07-18

## 2017-03-09 ASSESSMENT — LIFESTYLE VARIABLES
AVERAGE NUMBER OF DAYS PER WEEK YOU HAVE A DRINK CONTAINING ALCOHOL: 7
EVER HAD A DRINK FIRST THING IN THE MORNING TO STEADY YOUR NERVES TO GET RID OF A HANGOVER: YES
ON A TYPICAL DAY WHEN YOU DRINK ALCOHOL HOW MANY DRINKS DO YOU HAVE: 10
HAVE PEOPLE ANNOYED YOU BY CRITICIZING YOUR DRINKING: YES
TOTAL SCORE: 4
TOTAL SCORE: 4
HOW MANY TIMES IN THE PAST YEAR HAVE YOU HAD 5 OR MORE DRINKS IN A DAY: 300
EVER FELT BAD OR GUILTY ABOUT YOUR DRINKING: YES
DOES PATIENT WANT TO TALK TO SOMEONE ABOUT QUITTING: NO
TOTAL SCORE: 4
DO YOU DRINK ALCOHOL: YES
HAVE YOU EVER FELT YOU SHOULD CUT DOWN ON YOUR DRINKING: YES
DOES PATIENT WANT TO STOP DRINKING: NO
CONSUMPTION TOTAL: POSITIVE

## 2017-03-09 ASSESSMENT — PAIN SCALES - WONG BAKER: WONGBAKER_NUMERICALRESPONSE: HURTS EVEN MORE

## 2017-03-09 NOTE — ED AVS SNAPSHOT
Exclusively.in Access Code: Activation code not generated  Current Exclusively.in Status: Patient Declined    Your email address is not on file at [facility].  Email Addresses are required for you to sign up for Exclusively.in, please contact [phone] to verify your personal information and to provide your email address prior to attempting to register for Exclusively.in.    Ashleigh Marquis  [address] [place] [address]    Exclusively.in  A secure, online tool to manage your health information     [employer]’s Exclusively.in® is a secure, online tool that connects you to your personalized health information from the privacy of your home -- day or night - making it very easy for you to manage your healthcare. Once the activation process is completed, you can even access your medical information using the Exclusively.in kiel, which is available for free in the Apple Kiel store or Google Play store.     To learn more about Exclusively.in, visit [email or web address]    There are two levels of access available (as shown below):   My Chart Features  [facility] Primary Care Doctor [facility]  Specialists [facility]  Urgent  Care Non-[facility] Primary Care Doctor   Email your healthcare team securely and privately 24/7 X X X    Manage appointments: schedule your next appointment; view details of past/upcoming appointments X      Request prescription refills. X      View recent personal medical records, including lab and immunizations X X X X   View health record, including health history, allergies, medications X X X X   Read reports about your outpatient visits, procedures, consult and ER notes X X X X   See your discharge summary, which is a recap of your hospital and/or ER visit that includes your diagnosis, lab results, and care plan X X  X     How to register for Kontikit:  Once your e-mail address has been verified, follow the following steps to sign up for Kontikit.     1. Go to  [email or web address]  2. Click on the Sign Up Now box, which takes you to the New  Member Sign Up page. You will need to provide the following information:  a. Enter your TheraSim Access Code exactly as it appears at the top of this page. (You will not need to use this code after you’ve completed the sign-up process. If you do not sign up before the expiration date, you must request a new code.)   b. Enter your date of birth.   c. Enter your home email address.   d. Click Submit, and follow the next screen’s instructions.  3. Create a MedSolutionst ID. This will be your TheraSim login ID and cannot be changed, so think of one that is secure and easy to remember.  4. Create a TheraSim password. You can change your password at any time.  5. Enter your Password Reset Question and Answer. This can be used at a later time if you forget your password.   6. Enter your e-mail address. This allows you to receive e-mail notifications when new information is available in TheraSim.  7. Click Sign Up. You can now view your health information.    For assistance activating your TheraSim account, call [phone]

## 2017-03-09 NOTE — ED AVS SNAPSHOT
3/9/2017          Ashleigh Marquis  [address] [place] [address] Street  [facility] [address]    Dear Ashleigh:    [facility] wants to ensure your discharge home is safe and you or your loved ones have had all your questions answered regarding your care after you leave the hospital.    You may receive a telephone call within two days of your discharge.  This call is to make certain you understand your discharge instructions as well as ensure we provided you with the best care possible during your stay with us.     The call will only last approximately 3-5 minutes and will be done by a nurse.    Once again, we want to ensure your discharge home is safe and that you have a clear understanding of any next steps in your care.  If you have any questions or concerns, please do not hesitate to contact us, we are here for you.  Thank you for choosing [facility] for your healthcare needs.    Sincerely,    David Ibrahim    [facility]

## 2017-03-09 NOTE — ED AVS SNAPSHOT
Home Care Instructions                                                                                                                Ashleigh Marquis   MRN: [id number]    Department:  [facility], Emergency Dept   Date of Visit:  3/9/2017            [facility], Emergency Dept    [address] [place] [address]    Phone:  [phone]      You were seen by     Satish Ayala M.D.      Your Diagnosis Was     Knee abrasion, right, initial encounter     S80.211A       Follow-up Information     1. Follow up with [facility], Emergency Dept.    Specialty:  Emergency Medicine    Why:  If symptoms worsen    Contact information    [address] [place] [address] [phone]        2. Follow up with [facility].    Contact information    [address] [place] [address] [phone]      Medication Information     Review all of your home medications and newly ordered medications with your primary doctor and/or pharmacist as soon as possible. Follow medication instructions as directed by your doctor and/or pharmacist.     Please keep your complete medication list with you and share with your physician. Update the information when medications are discontinued, doses are changed, or new medications (including over-the-counter products) are added; and carry medication information at all times in the event of emergency situations.               Medication List      ASK your doctor about these medications        Instructions    Morning Afternoon Evening Bedtime    carisoprodol 350 MG Tabs   Commonly known as:  SOMA        Take 1 Tab by mouth every 8 hours as needed for Muscle Spasms.   Dose:  350 mg                        clonazepam 1 MG Tabs   Commonly known as:  KLONOPIN        Take 0.5 mg by mouth 2 times a day. Unknown dose   Dose:  0.5 mg                        fluoxetine 10 MG Caps   Commonly known as:  PROZAC        Take 10 mg by  mouth every day. Unknown dose   Dose:  10 mg                        lisinopril 10 MG Tabs   Commonly known as:  PRINIVIL        Take 10 mg by mouth.   Dose:  10 mg                        spironolactone 25 MG Tabs   Commonly known as:  ALDACTONE        Take 25 mg by mouth every day. Unknown dose   Dose:  25 mg                                Procedures and tests performed during your visit     DX-KNEE 3 VIEWS RIGHT            Patient Information     Patient Information    Following emergency treatment: all patient requiring follow-up care must return either to a private physician or a clinic if your condition worsens before you are able to obtain further medical attention, please return to the emergency room.     Billing Information    At [facility], we work to make the billing process streamlined for our patients.  Our Representatives are here to answer any questions you may have regarding your hospital bill.  If you have insurance coverage and have supplied your insurance information to us, we will submit a claim to your insurer on your behalf.  Should you have any questions regarding your bill, we can be reached online or by phone as follows:  Online: You are able pay your bills online or live chat with our representatives about any billing questions you may have. We are here to help Monday - Friday from 8:00am to 7:30pm and 9:00am - 12:00pm on Saturdays.  Please visit [email or web address]  for more information.   Phone:  [phone] or [phone]    Please note that your emergency physician, surgeon, pathologist, radiologist, anesthesiologist, and other specialists are not employed by [facility] and will therefore bill separately for their services.  Please contact them directly for any questions concerning their bills at the numbers below:     Emergency Physician Services:  [phone]  [facility]:  [phone]  [facility]:   [phone]  [facility]:  [phone]    1. Your final bill may vary from the amount quoted upon discharge if all procedures are not complete at that time, or if your doctor has additional procedures of which we are not aware. You will receive an additional bill if you return to the Emergency Department at [facility] for suture removal regardless of the facility of which the sutures were placed.     2. Please arrange for settlement of this account at the emergency registration.    3. All self-pay accounts are due in full at the time of treatment.  If you are unable to meet this obligation then payment is expected within 4-5 days.     4. If you have had radiology studies (CT, X-ray, Ultrasound, MRI), you have received a preliminary result during your emergency department visit. Please contact the radiology department [phone] to receive a copy of your final result. Please discuss the Final result with your primary physician or with the follow up physician provided.     Crisis Hotline:  [place] Crisis Hotline:  4-474-MQPBRTO or 1-842.578.4906  [place] Crisis Hotline:    1-829.854.6863 or 427-853-0150         ED Discharge Follow Up Questions    1. In order to provide you with very good care, we would like to follow up with a phone call in the next few days.  May we have your permission to contact you?     YES /  NO    2. What is the best phone number to call you? (       )_____-__________    3. What is the best time to call you?      Morning  /  Afternoon  /  Evening                   Patient Signature:  ____________________________________________________________    Date:  ____________________________________________________________

## 2017-03-09 NOTE — ED NOTES
Advised pot to f/u at [facility] for [facility].  Given DC instructions.  [facility] for [facility].  Directed to phone.  Pt verbalizes understanding.

## 2017-03-09 NOTE — ED NOTES
Chief Complaint   Patient presents with   • GLF     pt fell down, witnessed by police. Pt c/o knee pain. Abrasion to R knee noted.   • Alcohol Intoxication     states she drank 1 pint. ambulatory with EMS     Pt ambulated to green 24 with EMS. ERP to see.

## 2017-03-09 NOTE — ED PROVIDER NOTES
ED Provider Note    Scribed for Satish Ayala M.D. by Pura Mendez. 3/9/2017  5:08 AM    Primary care provider: [facility]  Means of arrival: Ambulance  History obtained from: patient   History limited by: none     CHIEF COMPLAINT  Chief Complaint   Patient presents with   • GLF     pt fell down, witnessed by police. Pt c/o knee pain. Abrasion to R knee noted.   • Alcohol Intoxication     states she drank 1 pint. ambulatory with EMS       HPI  Ashleigh Marquis is a 56 y.o. Female with history of hypertension who presents to the Emergency Department via ambulance status post ground level fall that was witnessed by police reporting right knee pain onset after the fall. The patient also presents intoxicated, stating that she drank 1 pint of liquor. The patient denies head trauma, chest pain, or other complaints.     REVIEW OF SYSTEMS  Pertinent positives include alcohol abuse, right knee pain, ground level fall.   Pertinent negatives include no head trauma, chest pain.    E      PAST MEDICAL HISTORY   has a past medical history of Hypertension; Vitamin D deficiency; Chronic low back pain; Muscle disorder; Anxiety; OSTEOPOROSIS; Arthritis; GERD (gastroesophageal reflux disease); Ulcer (CMS-[facility]); ASTHMA; HTN; Cancer (CMS-[facility]) (1981); Renal disorder; Indigestion; Congestive heart failure (CMS-[facility]); Psychiatric disorder; PTSD (post-traumatic stress disorder); Depression; Seizure (CMS-[facility]); Other specified symptom associated with female genital organs; Fall; Alcoholism (CMS-[facility]); and EtOH dependence (CMS-HCC).    SURGICAL HISTORY   has past surgical history that includes hernia repair (1977); cysto stent placemnt pre surg (10/7/2010); cystoscopy stent placement (11/9/2010); ureteroscopy (11/9/2010); lasertripsy (11/9/2010); and stent removal (11/9/2010).    SOCIAL HISTORY  Social History   Substance Use Topics   • Smoking status: Current Every Day Smoker -- 0.50 packs/day for 20 years     Types:  "Cigarettes   • Smokeless tobacco: Never Used      Comment: 1/2 pack per day   • Alcohol Use: Yes      Comment: \"lots of vodka\"      History   Drug Use No       FAMILY HISTORY  Family History   Problem Relation Age of Onset   • Heart Disease Father    • Hypertension Father    • Diabetes Father    • Cancer Paternal Grandmother    • Depression Other    • Lung Disease Neg Hx    • Stroke Neg Hx        CURRENT MEDICATIONS  Home Medications     Reviewed by Rosette Rodriguez R.N. (Registered Nurse) on 03/09/17 at 0444  Med List Status: Complete    Medication Last Dose Status    carisoprodol (SOMA) 350 MG Tab not taking Active    clonazepam (KLONOPIN) 1 MG Tab 3/8/2017 Active    fluoxetine (PROZAC) 10 MG Cap 3/8/2017 Active    lisinopril (PRINIVIL) 10 MG Tab 3/8/2017 Active    spironolactone (ALDACTONE) 25 MG Tab 3/8/2017 Active                ALLERGIES  Allergies   Allergen Reactions   • Sulfa Drugs Vomiting   • Toradol Vomiting   • Neurontin [Gabapentin]      Bowel incontinence         PHYSICAL EXAM  VITAL SIGNS: /86 mmHg  Pulse 72  Temp(Src) 36.7 °C (98 °F)  Resp 16  Ht 1.651 m (5' 5\")  Wt 73.483 kg (162 lb)  BMI 26.96 kg/m2    Nursing note and vitals reviewed.  Constitutional: Well-developed and well-nourished. No distress. Smells strongly of alcohol  HENT: Head is normocephalic and atraumatic.   Eyes:  Conjunctiva are normal.   Cardiovascular: Normal peripheral perfusion   Pulmonary/Chest: no respiratory distress.   Abdominal: Soft and non-tender. No distention    Musculoskeletal: Right knee abrasion, no apparent joint effusion, no deformity   Neurological: slurred intoxicated speech. Awake, answers questions   Skin: Skin is warm and dry. No rash.   Psychiatric: intoxicated     DIAGNOSTIC STUDIES / PROCEDURES    RADIOLOGY  DX-KNEE 3 VIEWS RIGHT   Final Result      1.  Mild to moderate degenerative change of RIGHT knee.   2.  No fracture, dislocation or gross joint effusion.   3.  Joint body is not excluded. "        The radiologist's interpretation of all radiological studies have been reviewed by me.    COURSE & MEDICAL DECISION MAKING  Nursing notes, VS, PMSFHx reviewed in chart.     5:16 AM- Patient seen and examined at bedside. Ordered knee xray to evaluate her symptoms. The differential diagnoses include but are not limited to: rule out fracture, contusion, alcohol abuse   I told the patient we will obtain an xray of her knee to rule out acute injury    6:00 AM Patient's xray shows no acute fractures.     6:06AM Reevaluated patient who is sleeping comfortably.     The patient was observed in the emergency department. She now ambulates with a stable gait. She'll be discharged      The patient will return for new or worsening symptoms and is stable at the time of discharge.    Patient has known hypertension that is being followed by her primary care provider.       DISPOSITION:  Patient will be discharged home in stable condition.    FOLLOW UP:  [facility], Emergency Dept  [address] [facility] [address]  [phone]    If symptoms worsen    [address] [address]  [phone]        FINAL IMPRESSION  1. Knee abrasion, right, initial encounter    2. Alcohol intoxication, with unspecified complication (CMS-HCC)    3. Alcoholism /alcohol abuse (CMS-HCC)          IPura (Cayetano), am scribing for, and in the presence of, Satish Ayala M.D..    Electronically signed by: Pura Mendez (Cayetano), 3/9/2017    ISatish M.D. personally performed the services described in this documentation, as scribed by Pura Mendez in my presence, and it is both accurate and complete.    The note accurately reflects work and decisions made by me.  Satish Aylaa  3/9/2017  10:59 AM

## 2017-03-25 ENCOUNTER — HOSPITAL ENCOUNTER (EMERGENCY)
Facility: MEDICAL CENTER | Age: 57
End: 2017-03-25
Payer: MEDICAID

## 2017-04-10 ENCOUNTER — APPOINTMENT (OUTPATIENT)
Dept: RADIOLOGY | Facility: MEDICAL CENTER | Age: 57
End: 2017-04-10
Attending: EMERGENCY MEDICINE
Payer: MEDICAID

## 2017-04-10 ENCOUNTER — HOSPITAL ENCOUNTER (EMERGENCY)
Facility: MEDICAL CENTER | Age: 57
End: 2017-04-11
Attending: EMERGENCY MEDICINE
Payer: MEDICAID

## 2017-04-10 DIAGNOSIS — F10.10 ALCOHOL ABUSE: ICD-10-CM

## 2017-04-10 DIAGNOSIS — T50.902D: ICD-10-CM

## 2017-04-10 LAB
ALBUMIN SERPL BCP-MCNC: 4 G/DL (ref 3.2–4.9)
ALBUMIN/GLOB SERPL: 1.2 G/DL
ALP SERPL-CCNC: 131 U/L (ref 30–99)
ALT SERPL-CCNC: 15 U/L (ref 2–50)
AMPHET UR QL SCN: NEGATIVE
ANION GAP SERPL CALC-SCNC: 10 MMOL/L (ref 0–11.9)
APAP SERPL-MCNC: <10 UG/ML (ref 10–30)
AST SERPL-CCNC: 28 U/L (ref 12–45)
BARBITURATES UR QL SCN: NEGATIVE
BASOPHILS # BLD AUTO: 1.9 % (ref 0–1.8)
BASOPHILS # BLD: 0.14 K/UL (ref 0–0.12)
BENZODIAZ UR QL SCN: POSITIVE
BILIRUB SERPL-MCNC: 0.2 MG/DL (ref 0.1–1.5)
BUN SERPL-MCNC: 12 MG/DL (ref 8–22)
BZE UR QL SCN: NEGATIVE
CALCIUM SERPL-MCNC: 8.6 MG/DL (ref 8.5–10.5)
CANNABINOIDS UR QL SCN: NEGATIVE
CHLORIDE SERPL-SCNC: 101 MMOL/L (ref 96–112)
CO2 SERPL-SCNC: 23 MMOL/L (ref 20–33)
CREAT SERPL-MCNC: 1.1 MG/DL (ref 0.5–1.4)
EOSINOPHIL # BLD AUTO: 0.08 K/UL (ref 0–0.51)
EOSINOPHIL NFR BLD: 1.1 % (ref 0–6.9)
ERYTHROCYTE [DISTWIDTH] IN BLOOD BY AUTOMATED COUNT: 55.1 FL (ref 35.9–50)
ETHANOL BLD-MCNC: 0.4 G/DL
GFR SERPL CREATININE-BSD FRML MDRD: 52 ML/MIN/1.73 M 2
GLOBULIN SER CALC-MCNC: 3.3 G/DL (ref 1.9–3.5)
GLUCOSE SERPL-MCNC: 90 MG/DL (ref 65–99)
HCT VFR BLD AUTO: 41.6 % (ref 37–47)
HGB BLD-MCNC: 13.3 G/DL (ref 12–16)
IMM GRANULOCYTES # BLD AUTO: 0.01 K/UL (ref 0–0.11)
IMM GRANULOCYTES NFR BLD AUTO: 0.1 % (ref 0–0.9)
LYMPHOCYTES # BLD AUTO: 3.02 K/UL (ref 1–4.8)
LYMPHOCYTES NFR BLD: 40.3 % (ref 22–41)
MCH RBC QN AUTO: 28.6 PG (ref 27–33)
MCHC RBC AUTO-ENTMCNC: 32 G/DL (ref 33.6–35)
MCV RBC AUTO: 89.5 FL (ref 81.4–97.8)
MDMA UR QL SCN: NEGATIVE
METHADONE UR QL SCN: NEGATIVE
MONOCYTES # BLD AUTO: 0.64 K/UL (ref 0–0.85)
MONOCYTES NFR BLD AUTO: 8.5 % (ref 0–13.4)
NEUTROPHILS # BLD AUTO: 3.61 K/UL (ref 2–7.15)
NEUTROPHILS NFR BLD: 48.1 % (ref 44–72)
NRBC # BLD AUTO: 0 K/UL
NRBC BLD AUTO-RTO: 0 /100 WBC
OPIATES UR QL SCN: NEGATIVE
OXYCODONE UR QL SCN: NEGATIVE
PCP UR QL SCN: NEGATIVE
PLATELET # BLD AUTO: 356 K/UL (ref 164–446)
PMV BLD AUTO: 9 FL (ref 9–12.9)
POTASSIUM SERPL-SCNC: 3.7 MMOL/L (ref 3.6–5.5)
PROPOXYPH UR QL SCN: NEGATIVE
PROT SERPL-MCNC: 7.3 G/DL (ref 6–8.2)
RBC # BLD AUTO: 4.65 M/UL (ref 4.2–5.4)
SALICYLATES SERPL-MCNC: 0 MG/DL (ref 15–25)
SODIUM SERPL-SCNC: 134 MMOL/L (ref 135–145)
WBC # BLD AUTO: 7.5 K/UL (ref 4.8–10.8)

## 2017-04-10 PROCEDURE — 85025 COMPLETE CBC W/AUTO DIFF WBC: CPT

## 2017-04-10 PROCEDURE — 71101 X-RAY EXAM UNILAT RIBS/CHEST: CPT | Mod: LT

## 2017-04-10 PROCEDURE — A9270 NON-COVERED ITEM OR SERVICE: HCPCS | Performed by: EMERGENCY MEDICINE

## 2017-04-10 PROCEDURE — 93005 ELECTROCARDIOGRAM TRACING: CPT | Performed by: EMERGENCY MEDICINE

## 2017-04-10 PROCEDURE — 36415 COLL VENOUS BLD VENIPUNCTURE: CPT

## 2017-04-10 PROCEDURE — 80307 DRUG TEST PRSMV CHEM ANLYZR: CPT

## 2017-04-10 PROCEDURE — 80053 COMPREHEN METABOLIC PANEL: CPT

## 2017-04-10 PROCEDURE — 99284 EMERGENCY DEPT VISIT MOD MDM: CPT

## 2017-04-10 PROCEDURE — 304561 HCHG STAT O2

## 2017-04-10 PROCEDURE — 304562 HCHG STAT O2 MASK/CANNULA

## 2017-04-10 PROCEDURE — 700102 HCHG RX REV CODE 250 W/ 637 OVERRIDE(OP): Performed by: EMERGENCY MEDICINE

## 2017-04-10 RX ORDER — IBUPROFEN 600 MG/1
600 TABLET ORAL ONCE
Status: COMPLETED | OUTPATIENT
Start: 2017-04-10 | End: 2017-04-10

## 2017-04-10 RX ADMIN — IBUPROFEN 600 MG: 600 TABLET ORAL at 17:15

## 2017-04-10 ASSESSMENT — PAIN SCALES - GENERAL: PAINLEVEL_OUTOF10: 0

## 2017-04-10 NOTE — ED NOTES
"Pt rogelio [facility] from home with c/c possible overdose on clonazepam.  Per EMS, pt stated she took approx 20 pills this morning.  911 was called by a neighbor who heard screaming coming from pt's house.  Pt arrives a&ox1, uncooperative with staff, stating \"I lied to the ambulance, I didn't take that.\"  Pt c/c left back pain- bruising noted to back.  On cardiac monitor.  Chart up for ERP evaluation.    "

## 2017-04-10 NOTE — ED AVS SNAPSHOT
Home Care Instructions                                                                                                                Ashleigh Marquis   MRN: [id number]    Department:  [facility], Emergency Dept   Date of Visit:  4/10/2017            [facility], Emergency Dept    [address] [place] [address]    Phone:  [phone]      You were seen by     1. Jake Mayo D.O.    2. Edwardo Goldstein M.D.      Your Diagnosis Was     Overdose, intentional self-harm, subsequent encounter     T50.902D       These are the medications you received during your hospitalization from 04/10/2017 1158 to 04/11/2017 0544     Date/Time Order Dose Route Action    04/10/2017 1715 ibuprofen (MOTRIN) tablet 600 mg 600 mg Oral Given    04/11/2017 0230 lorazepam (ATIVAN) tablet 1 mg 1 mg Oral Given      Follow-up Information     1. Follow up with [facility] In 2 days.    Specialty:  Family Medicine    Contact information    [address]  [address]  [facility] [address]  [phone]          2. Follow up with [facility], Emergency Dept.    Specialty:  Emergency Medicine    Why:  As needed, If symptoms worsen    Contact information    [address] [place] [address] [phone]      Medication Information     Review all of your home medications and newly ordered medications with your primary doctor and/or pharmacist as soon as possible. Follow medication instructions as directed by your doctor and/or pharmacist.     Please keep your complete medication list with you and share with your physician. Update the information when medications are discontinued, doses are changed, or new medications (including over-the-counter products) are added; and carry medication information at all times in the event of emergency situations.               Medication List      ASK your doctor about these medications        Instructions    Morning Afternoon Evening Bedtime    carisoprodol 350 MG Tabs   Commonly known as:  SOMA        Take 1 Tab by mouth every 8 hours as needed for Muscle Spasms.   Dose:  350 mg                        clonazepam 1 MG Tabs   Commonly known as:  KLONOPIN        Take 0.5 mg by mouth 2 times a day. Unknown dose   Dose:  0.5 mg                        fluoxetine 10 MG Caps   Commonly known as:  PROZAC        Take 10 mg by mouth every day. Unknown dose   Dose:  10 mg                        lisinopril 10 MG Tabs   Commonly known as:  PRINIVIL        Take 10 mg by mouth.   Dose:  10 mg                        spironolactone 25 MG Tabs   Commonly known as:  ALDACTONE        Take 25 mg by mouth every day. Unknown dose   Dose:  25 mg                                Procedures and tests performed during your visit     ACETAMINOPHEN    CARDIAC MONITORING    CBC WITH DIFFERENTIAL    COMP METABOLIC PANEL    DIAGNOSTIC ALCOHOL    [id number] (WITH 1-VIEW CXR) LEFT    EKG (ER)    ESTIMATED GFR    O2 Protocol    SALICYLATE    SALINE LOCK    URINE DRUG SCREEN (TRIAGE)        Discharge Instructions       Alcohol Problems  Most adults who drink alcohol drink in moderation (not a lot) are at low risk for developing problems related to their drinking. However, all drinkers, including low-risk drinkers, should know about the health risks connected with drinking alcohol.  RECOMMENDATIONS FOR LOW-RISK DRINKING   Drink in moderation. Moderate drinking is defined as follows:   · Men - no more than 2 drinks per day.  · Nonpregnant women - no more than 1 drink per day.  · Over age 65 - no more than 1 drink per day.  A standard drink is 12 grams of pure alcohol, which is equal to a 12 ounce bottle of beer or wine cooler, a 5 ounce glass of wine, or 1.5 ounces of distilled spirits (such as whiskey, gama, vodka, or rum).   ABSTAIN FROM (DO NOT DRINK) ALCOHOL:  · When pregnant or considering pregnancy.  · When taking a medication that interacts with alcohol.  · If you are alcohol  dependent.  · A medical condition that prohibits drinking alcohol (such as ulcer, liver disease, or heart disease).  DISCUSS WITH YOUR CAREGIVER:  · If you are at risk for coronary heart disease, discuss the potential benefits and risks of alcohol use: Light to moderate drinking is associated with lower rates of coronary heart disease in certain populations (for example, men over age 45 and postmenopausal women). Infrequent or nondrinkers are advised not to begin light to moderate drinking to reduce the risk of coronary heart disease so as to avoid creating an alcohol-related problem. Similar protective effects can likely be gained through proper diet and exercise.  · Women and the elderly have smaller amounts of body water than men. As a result women and the elderly achieve a higher blood alcohol concentration after drinking the same amount of alcohol.  · Exposing a fetus to alcohol can cause a broad range of birth defects referred to as Fetal Alcohol Syndrome (FAS) or Alcohol-Related Birth Defects (ARBD). Although FAS/ARBD is connected with excessive alcohol consumption during pregnancy, studies also have reported neurobehavioral problems in infants born to mothers reporting drinking an average of 1 drink per day during pregnancy.  · Heavier drinking (the consumption of more than 4 drinks per occasion by men and more than 3 drinks per occasion by women) impairs learning (cognitive) and psychomotor functions and increases the risk of alcohol-related problems, including accidents and injuries.  CAGE QUESTIONS:   · Have you ever felt that you should Cut down on your drinking?  · Have people Annoyed you by criticizing your drinking?  · Have you ever felt bad or Guilty about your drinking?  · Have you ever had a drink first thing in the morning to steady your nerves or get rid of a hangover (Eye opener)?  If you answered positively to any of these questions: You may be at risk for alcohol-related problems if alcohol  consumption is:   · Men: Greater than 14 drinks per week or more than 4 drinks per occasion.  · Women: Greater than 7 drinks per week or more than 3 drinks per occasion.  Do you or your family have a medical history of alcohol-related problems, such as:  · Blackouts.  · Sexual dysfunction.  · Depression.  · Trauma.  · Liver dysfunction.  · Sleep disorders.  · Hypertension.  · Chronic abdominal pain.  · Has your drinking ever caused you problems, such as problems with your family, problems with your work (or school) performance, or accidents/injuries?  · Do you have a compulsion to drink or a preoccupation with drinking?  · Do you have poor control or are you unable to stop drinking once you have started?  · Do you have to drink to avoid withdrawal symptoms?  · Do you have problems with withdrawal such as tremors, nausea, sweats, or mood disturbances?  · Does it take more alcohol than in the past to get you high?  · Do you feel a strong urge to drink?  · Do you change your plans so that you can have a drink?  · Do you ever drink in the morning to relieve the shakes or a hangover?  If you have answered a number of the previous questions positively, it may be time for you to talk to your caregivers, family, and friends and see if they think you have a problem. Alcoholism is a chemical dependency that keeps getting worse and will eventually destroy your health and relationships. Many alcoholics end up dead, impoverished, or in [facility]. This is often the end result of all chemical dependency.  · Do not be discouraged if you are not ready to take action immediately.  · Decisions to change behavior often involve up and down desires to change and feeling like you cannot decide.  · Try to think more seriously about your drinking behavior.  · Think of the reasons to quit.  WHERE TO GO FOR ADDITIONAL INFORMATION   · The National [place] on Alcohol Abuse and Alcoholism (NIAAA)  www.niaaa.nih.gov  · National [place] on  Alcoholism and Drug Dependence (NCADD)  www.ncadd.org  · American Society of Addiction Medicine (ASAM)  www.asam.org   Document Released: 12/18/2006 Document Revised: 03/11/2013 Document Reviewed: 08/05/2009  ExitCare® Patient Information ©2014 [employer].              Patient Information     Patient Information    Following emergency treatment: all patient requiring follow-up care must return either to a private physician or a clinic if your condition worsens before you are able to obtain further medical attention, please return to the emergency room.     Billing Information    At [facility], we work to make the billing process streamlined for our patients.  Our Representatives are here to answer any questions you may have regarding your hospital bill.  If you have insurance coverage and have supplied your insurance information to us, we will submit a claim to your insurer on your behalf.  Should you have any questions regarding your bill, we can be reached online or by phone as follows:  Online: You are able pay your bills online or live chat with our representatives about any billing questions you may have. We are here to help Monday - Friday from 8:00am to 7:30pm and 9:00am - 12:00pm on Saturdays.  Please visit [email or web address]  for more information.   Phone:  [phone] or [phone]    Please note that your emergency physician, surgeon, pathologist, radiologist, anesthesiologist, and other specialists are not employed by [facility] and will therefore bill separately for their services.  Please contact them directly for any questions concerning their bills at the numbers below:     Emergency Physician Services:  [phone]  [facility]:  [phone]  [facility]:  [phone]  [facility]:  [phone]    1. Your final bill may vary from the amount quoted upon discharge if all procedures are not complete at that  time, or if your doctor has additional procedures of which we are not aware. You will receive an additional bill if you return to the Emergency Department at [facility] for suture removal regardless of the facility of which the sutures were placed.     2. Please arrange for settlement of this account at the emergency registration.    3. All self-pay accounts are due in full at the time of treatment.  If you are unable to meet this obligation then payment is expected within 4-5 days.     4. If you have had radiology studies (CT, X-ray, Ultrasound, MRI), you have received a preliminary result during your emergency department visit. Please contact the radiology department [phone] to receive a copy of your final result. Please discuss the Final result with your primary physician or with the follow up physician provided.     Crisis Hotline:  [place] Crisis Hotline:  3-773-IFMWEFY or 1-875.509.7478  [place] Crisis Hotline:    1-932.115.6310 or 625-553-5550         ED Discharge Follow Up Questions    1. In order to provide you with very good care, we would like to follow up with a phone call in the next few days.  May we have your permission to contact you?     YES /  NO    2. What is the best phone number to call you? (       )_____-__________    3. What is the best time to call you?      Morning  /  Afternoon  /  Evening                   Patient Signature:  ____________________________________________________________    Date:  ____________________________________________________________

## 2017-04-10 NOTE — ED AVS SNAPSHOT
4/11/2017          Ashleigh Marquis  [address] [place] [address] Street  [facility] [address]    Dear Ashleigh:    [facility] wants to ensure your discharge home is safe and you or your loved ones have had all your questions answered regarding your care after you leave the hospital.    You may receive a telephone call within two days of your discharge.  This call is to make certain you understand your discharge instructions as well as ensure we provided you with the best care possible during your stay with us.     The call will only last approximately 3-5 minutes and will be done by a nurse.    Once again, we want to ensure your discharge home is safe and that you have a clear understanding of any next steps in your care.  If you have any questions or concerns, please do not hesitate to contact us, we are here for you.  Thank you for choosing [facility] for your healthcare needs.    Sincerely,    David Ibrahim    [facility]
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
## 2017-04-10 NOTE — ED AVS SNAPSHOT
Limk Access Code: Activation code not generated  Current Limk Status: Patient Declined    Your email address is not on file at [facility].  Email Addresses are required for you to sign up for Limk, please contact [phone] to verify your personal information and to provide your email address prior to attempting to register for Limk.    Ashleigh Marquis  [address] [place] [address]    Limk  A secure, online tool to manage your health information     [facility]’s Limk® is a secure, online tool that connects you to your personalized health information from the privacy of your home -- day or night - making it very easy for you to manage your healthcare. Once the activation process is completed, you can even access your medical information using the Limk kiel, which is available for free in the Apple Kiel store or Google Play store.     To learn more about Limk, visit [email or web address]    There are two levels of access available (as shown below):   My Chart Features  [facility] Primary Care Doctor [facility]  Specialists [facility]  Urgent  Care Non-[facility] Primary Care Doctor   Email your healthcare team securely and privately 24/7 X X X    Manage appointments: schedule your next appointment; view details of past/upcoming appointments X      Request prescription refills. X      View recent personal medical records, including lab and immunizations X X X X   View health record, including health history, allergies, medications X X X X   Read reports about your outpatient visits, procedures, consult and ER notes X X X X   See your discharge summary, which is a recap of your hospital and/or ER visit that includes your diagnosis, lab results, and care plan X X  X     How to register for Errplanet:  Once your e-mail address has been verified, follow the following steps to sign up for Errplanet.     1. Go to  [email or web address]  2. Click on the Sign Up Now box, which takes you to the New  Member Sign Up page. You will need to provide the following information:  a. Enter your Wello Access Code exactly as it appears at the top of this page. (You will not need to use this code after you’ve completed the sign-up process. If you do not sign up before the expiration date, you must request a new code.)   b. Enter your date of birth.   c. Enter your home email address.   d. Click Submit, and follow the next screen’s instructions.  3. Create a [employer] ID. This will be your Wello login ID and cannot be changed, so think of one that is secure and easy to remember.  4. Create a Wello password. You can change your password at any time.  5. Enter your Password Reset Question and Answer. This can be used at a later time if you forget your password.   6. Enter your e-mail address. This allows you to receive e-mail notifications when new information is available in Wello.  7. Click Sign Up. You can now view your health information.    For assistance activating your Wello account, call [phone]

## 2017-04-11 VITALS
WEIGHT: 170 LBS | DIASTOLIC BLOOD PRESSURE: 98 MMHG | TEMPERATURE: 98.1 F | BODY MASS INDEX: 28.29 KG/M2 | SYSTOLIC BLOOD PRESSURE: 156 MMHG | OXYGEN SATURATION: 98 % | RESPIRATION RATE: 20 BRPM | HEART RATE: 80 BPM

## 2017-04-11 PROCEDURE — A9270 NON-COVERED ITEM OR SERVICE: HCPCS | Performed by: EMERGENCY MEDICINE

## 2017-04-11 PROCEDURE — 90791 PSYCH DIAGNOSTIC EVALUATION: CPT

## 2017-04-11 PROCEDURE — 700102 HCHG RX REV CODE 250 W/ 637 OVERRIDE(OP): Performed by: EMERGENCY MEDICINE

## 2017-04-11 RX ORDER — LORAZEPAM 1 MG/1
1 TABLET ORAL ONCE
Status: COMPLETED | OUTPATIENT
Start: 2017-04-11 | End: 2017-04-11

## 2017-04-11 RX ADMIN — LORAZEPAM 1 MG: 1 TABLET ORAL at 02:30
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
## 2017-04-11 NOTE — CONSULTS
[facility]  Crisis/Safety Plan    Name:  Ashleigh Marquis  MRN:  [id number]  Date:  2017    Warning signs that a crisis may be developing for me or I may be at risk:  1)  Getting stressed out with mother  2) going to casino  3) buying  vodka    Coping strategies I can use on my own (relaxation, physical activity, etc):  1) go AA  2) Talk with counselor/MD  3) Continue with weight loss, walking    Ways I can make my environment safe:  1)consider BF monitoring meds  2)  3)    Things I want to tell myself when I feel a crisis developin) Positive affirmations, Pslm 23  2) one minute at a time  3) admit powerless, call AA    People I can contact for support or distraction (and their phone numbers):  1) Boyfriend  2) Confucianist friends  3) Get temp Sponsor    If I’m not able to reach my support people, or the above strategies don’t help, I can contact the following professionals, agencies, or hotlines:  1) Crisis Call Center ():  [phone] -OR- [phone]  2) Crisis Text Line ():  Text START to 314018  3)   4)     Ronak Romero R.N.

## 2017-04-11 NOTE — ED NOTES
Pt awoke feeling anxious and sweaty. Called to the room and asked for something to relax her. Order obtained and medication given.

## 2017-04-11 NOTE — ED PROVIDER NOTES
Progress Note:    Ashleigh Marquis  [id number]    56 y.o. female presenting initially with alcohol intoxication and concerns for possible overdose. This patient was signed out to me by prior provider to follow-up with the life skills recommendations after the patient has metabolized alcohol in her system. Initial blood alcohol level was 400.    She was allowed to metabolize the alcohol in her system and then was evaluated by life skills. She does not recall any overdose and denies any active suicidal ideation now. Denies suicidal thoughts or suicide attempt earlier today. She does not recall all the events from earlier today.    She was evaluated by life skills independently and it is felt that the patient now that she is sober, does not meet legal hold criteria. After my bedside evaluation of this patient with her denials of any suicidal or homicidal ideation, cleared thoughts, some mind, I agree with his assessment. Patient was given very strict return precautions however for any worsening of her symptoms or development of any other concerning signs or symptoms. Legal hold was lifted not the patient is not suicidal in her sober state.    /98 mmHg  Pulse 80  Temp(Src) 36.7 °C (98.1 °F)  Resp 20  Wt 77.111 kg (170 lb)  SpO2 98%    The patient was referred to primary care where they will receive further BP management.      [facility]  [address]  [address]  [facility] [address]  [phone]    In 2 days      [facility], Emergency Dept  [address] [facility] [address] [phone]    As needed, If symptoms worsen      Diagnosis:  1. Overdose, intentional self-harm, subsequent encounter    2. Alcohol abuse

## 2017-04-11 NOTE — ED NOTES
Pt resting quietly in bed, eyes closed, remains under blanket. Even and unlabored breathing. Remains on heart monitor. Sitter present.

## 2017-04-11 NOTE — DISCHARGE INSTRUCTIONS
Alcohol Problems  Most adults who drink alcohol drink in moderation (not a lot) are at low risk for developing problems related to their drinking. However, all drinkers, including low-risk drinkers, should know about the health risks connected with drinking alcohol.  RECOMMENDATIONS FOR LOW-RISK DRINKING   Drink in moderation. Moderate drinking is defined as follows:   · Men - no more than 2 drinks per day.  · Nonpregnant women - no more than 1 drink per day.  · Over age 65 - no more than 1 drink per day.  A standard drink is 12 grams of pure alcohol, which is equal to a 12 ounce bottle of beer or wine cooler, a 5 ounce glass of wine, or 1.5 ounces of distilled spirits (such as whiskey, gama, vodka, or rum).   ABSTAIN FROM (DO NOT DRINK) ALCOHOL:  · When pregnant or considering pregnancy.  · When taking a medication that interacts with alcohol.  · If you are alcohol dependent.  · A medical condition that prohibits drinking alcohol (such as ulcer, liver disease, or heart disease).  DISCUSS WITH YOUR CAREGIVER:  · If you are at risk for coronary heart disease, discuss the potential benefits and risks of alcohol use: Light to moderate drinking is associated with lower rates of coronary heart disease in certain populations (for example, men over age 45 and postmenopausal women). Infrequent or nondrinkers are advised not to begin light to moderate drinking to reduce the risk of coronary heart disease so as to avoid creating an alcohol-related problem. Similar protective effects can likely be gained through proper diet and exercise.  · Women and the elderly have smaller amounts of body water than men. As a result women and the elderly achieve a higher blood alcohol concentration after drinking the same amount of alcohol.  · Exposing a fetus to alcohol can cause a broad range of birth defects referred to as Fetal Alcohol Syndrome (FAS) or Alcohol-Related Birth Defects (ARBD). Although FAS/ARBD is connected with excessive  alcohol consumption during pregnancy, studies also have reported neurobehavioral problems in infants born to mothers reporting drinking an average of 1 drink per day during pregnancy.  · Heavier drinking (the consumption of more than 4 drinks per occasion by men and more than 3 drinks per occasion by women) impairs learning (cognitive) and psychomotor functions and increases the risk of alcohol-related problems, including accidents and injuries.  CAGE QUESTIONS:   · Have you ever felt that you should Cut down on your drinking?  · Have people Annoyed you by criticizing your drinking?  · Have you ever felt bad or Guilty about your drinking?  · Have you ever had a drink first thing in the morning to steady your nerves or get rid of a hangover (Eye opener)?  If you answered positively to any of these questions: You may be at risk for alcohol-related problems if alcohol consumption is:   · Men: Greater than 14 drinks per week or more than 4 drinks per occasion.  · Women: Greater than 7 drinks per week or more than 3 drinks per occasion.  Do you or your family have a medical history of alcohol-related problems, such as:  · Blackouts.  · Sexual dysfunction.  · Depression.  · Trauma.  · Liver dysfunction.  · Sleep disorders.  · Hypertension.  · Chronic abdominal pain.  · Has your drinking ever caused you problems, such as problems with your family, problems with your work (or school) performance, or accidents/injuries?  · Do you have a compulsion to drink or a preoccupation with drinking?  · Do you have poor control or are you unable to stop drinking once you have started?  · Do you have to drink to avoid withdrawal symptoms?  · Do you have problems with withdrawal such as tremors, nausea, sweats, or mood disturbances?  · Does it take more alcohol than in the past to get you high?  · Do you feel a strong urge to drink?  · Do you change your plans so that you can have a drink?  · Do you ever drink in the morning to relieve  the shakes or a hangover?  If you have answered a number of the previous questions positively, it may be time for you to talk to your caregivers, family, and friends and see if they think you have a problem. Alcoholism is a chemical dependency that keeps getting worse and will eventually destroy your health and relationships. Many alcoholics end up dead, impoverished, or in assisted. This is often the end result of all chemical dependency.  · Do not be discouraged if you are not ready to take action immediately.  · Decisions to change behavior often involve up and down desires to change and feeling like you cannot decide.  · Try to think more seriously about your drinking behavior.  · Think of the reasons to quit.  WHERE TO GO FOR ADDITIONAL INFORMATION   · The National [place] on Alcohol Abuse and Alcoholism (NIAAA)  www.niaaa.nih.gov  · National [place] on Alcoholism and Drug Dependence (NCADD)  www.ncadd.org  · American Society of Addiction Medicine (ASAM)  www.asam.org   Document Released: 12/18/2006 Document Revised: 03/11/2013 Document Reviewed: 08/05/2009  ExitCare® Patient Information ©2014 [employer], [employer].

## 2017-04-12 LAB — EKG IMPRESSION: NORMAL

## 2017-05-16 ENCOUNTER — HOSPITAL ENCOUNTER (EMERGENCY)
Dept: HOSPITAL 8 - ED | Age: 57
LOS: 1 days | Discharge: HOME | End: 2017-05-17
Payer: MEDICAID

## 2017-05-16 VITALS — WEIGHT: 143.3 LBS | HEIGHT: 68 IN | BODY MASS INDEX: 21.72 KG/M2

## 2017-05-16 DIAGNOSIS — Z88.6: ICD-10-CM

## 2017-05-16 DIAGNOSIS — Z88.8: ICD-10-CM

## 2017-05-16 DIAGNOSIS — Z88.2: ICD-10-CM

## 2017-05-16 DIAGNOSIS — Z88.0: ICD-10-CM

## 2017-05-16 DIAGNOSIS — I10: ICD-10-CM

## 2017-05-16 DIAGNOSIS — F10.229: Primary | ICD-10-CM

## 2017-05-16 PROCEDURE — 99284 EMERGENCY DEPT VISIT MOD MDM: CPT

## 2017-05-16 PROCEDURE — 96361 HYDRATE IV INFUSION ADD-ON: CPT

## 2017-05-16 PROCEDURE — 70450 CT HEAD/BRAIN W/O DYE: CPT

## 2017-05-16 PROCEDURE — 96365 THER/PROPH/DIAG IV INF INIT: CPT

## 2017-05-17 ENCOUNTER — HOSPITAL ENCOUNTER (EMERGENCY)
Dept: HOSPITAL 8 - ED | Age: 57
Discharge: HOME | End: 2017-05-17
Payer: MEDICAID

## 2017-05-17 VITALS — SYSTOLIC BLOOD PRESSURE: 115 MMHG | DIASTOLIC BLOOD PRESSURE: 78 MMHG

## 2017-05-17 VITALS — SYSTOLIC BLOOD PRESSURE: 144 MMHG | DIASTOLIC BLOOD PRESSURE: 74 MMHG

## 2017-05-17 VITALS — HEIGHT: 68 IN | BODY MASS INDEX: 28.4 KG/M2 | WEIGHT: 187.39 LBS

## 2017-05-17 DIAGNOSIS — F10.229: Primary | ICD-10-CM

## 2017-05-17 DIAGNOSIS — F19.19: ICD-10-CM

## 2017-05-17 LAB
APAP SERPL-MCNC: < 2 MCG/ML (ref 10–30)
AST SERPL-CCNC: 39 U/L (ref 15–37)
BUN SERPL-MCNC: 12 MG/DL (ref 7–18)
DAU SCREEN: (no result)

## 2017-05-17 PROCEDURE — 36415 COLL VENOUS BLD VENIPUNCTURE: CPT

## 2017-05-17 PROCEDURE — 85025 COMPLETE CBC W/AUTO DIFF WBC: CPT

## 2017-05-17 PROCEDURE — G0480 DRUG TEST DEF 1-7 CLASSES: HCPCS

## 2017-05-17 PROCEDURE — 81003 URINALYSIS AUTO W/O SCOPE: CPT

## 2017-05-17 PROCEDURE — 80329 ANALGESICS NON-OPIOID 1 OR 2: CPT

## 2017-05-17 PROCEDURE — 80053 COMPREHEN METABOLIC PANEL: CPT

## 2017-05-17 PROCEDURE — 80307 DRUG TEST PRSMV CHEM ANLYZR: CPT

## 2017-05-17 PROCEDURE — 99284 EMERGENCY DEPT VISIT MOD MDM: CPT

## 2017-05-18 ENCOUNTER — HOSPITAL ENCOUNTER (EMERGENCY)
Dept: HOSPITAL 8 - ED | Age: 57
Discharge: HOME | End: 2017-05-18
Payer: MEDICAID

## 2017-05-18 VITALS — BODY MASS INDEX: 24.09 KG/M2 | HEIGHT: 66 IN | WEIGHT: 149.91 LBS

## 2017-05-18 VITALS — DIASTOLIC BLOOD PRESSURE: 91 MMHG | SYSTOLIC BLOOD PRESSURE: 138 MMHG

## 2017-05-18 DIAGNOSIS — I10: ICD-10-CM
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
## 2017-05-18 DIAGNOSIS — Z88.2: ICD-10-CM

## 2017-05-18 DIAGNOSIS — T14.8: ICD-10-CM
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
## 2017-05-18 DIAGNOSIS — F10.120: Primary | ICD-10-CM

## 2017-05-18 DIAGNOSIS — Z88.8: ICD-10-CM

## 2017-05-18 DIAGNOSIS — Z88.6: ICD-10-CM
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
## 2017-05-18 DIAGNOSIS — K21.9: ICD-10-CM
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
## 2017-05-18 LAB — BUN SERPL-MCNC: 14 MG/DL (ref 7–18)

## 2017-05-18 PROCEDURE — 82962 GLUCOSE BLOOD TEST: CPT

## 2017-05-18 PROCEDURE — 82040 ASSAY OF SERUM ALBUMIN: CPT

## 2017-05-18 PROCEDURE — 85025 COMPLETE CBC W/AUTO DIFF WBC: CPT

## 2017-05-18 PROCEDURE — 36415 COLL VENOUS BLD VENIPUNCTURE: CPT

## 2017-05-18 PROCEDURE — 72125 CT NECK SPINE W/O DYE: CPT

## 2017-05-18 PROCEDURE — 80048 BASIC METABOLIC PNL TOTAL CA: CPT

## 2017-05-18 PROCEDURE — 71010: CPT

## 2017-05-18 PROCEDURE — 99285 EMERGENCY DEPT VISIT HI MDM: CPT

## 2017-05-18 PROCEDURE — 70450 CT HEAD/BRAIN W/O DYE: CPT

## 2017-05-21 ENCOUNTER — HOSPITAL ENCOUNTER (EMERGENCY)
Dept: HOSPITAL 8 - ED | Age: 57
Discharge: HOME | End: 2017-05-21
Payer: MEDICAID

## 2017-05-21 VITALS — SYSTOLIC BLOOD PRESSURE: 124 MMHG | DIASTOLIC BLOOD PRESSURE: 87 MMHG

## 2017-05-21 VITALS — WEIGHT: 220.46 LBS | HEIGHT: 66 IN | BODY MASS INDEX: 35.43 KG/M2

## 2017-05-21 VITALS — DIASTOLIC BLOOD PRESSURE: 82 MMHG | SYSTOLIC BLOOD PRESSURE: 128 MMHG

## 2017-05-21 VITALS — HEIGHT: 66 IN | WEIGHT: 154.32 LBS | BODY MASS INDEX: 24.8 KG/M2

## 2017-05-21 DIAGNOSIS — Z88.6: ICD-10-CM

## 2017-05-21 DIAGNOSIS — F10.120: Primary | ICD-10-CM

## 2017-05-21 DIAGNOSIS — I10: ICD-10-CM

## 2017-05-21 DIAGNOSIS — K21.9: ICD-10-CM

## 2017-05-21 DIAGNOSIS — F43.10: ICD-10-CM

## 2017-05-21 DIAGNOSIS — Z88.1: ICD-10-CM

## 2017-05-21 DIAGNOSIS — Z88.0: ICD-10-CM

## 2017-05-21 DIAGNOSIS — Z87.440: ICD-10-CM

## 2017-05-21 DIAGNOSIS — I95.9: ICD-10-CM

## 2017-05-21 DIAGNOSIS — Z88.8: ICD-10-CM

## 2017-05-21 DIAGNOSIS — F32.9: ICD-10-CM

## 2017-05-21 PROCEDURE — 99283 EMERGENCY DEPT VISIT LOW MDM: CPT

## 2017-05-23 ENCOUNTER — HOSPITAL ENCOUNTER (EMERGENCY)
Facility: MEDICAL CENTER | Age: 57
End: 2017-05-23
Attending: EMERGENCY MEDICINE
Payer: MEDICAID

## 2017-05-23 VITALS
DIASTOLIC BLOOD PRESSURE: 89 MMHG | WEIGHT: 169.31 LBS | BODY MASS INDEX: 28.21 KG/M2 | TEMPERATURE: 97.3 F | RESPIRATION RATE: 16 BRPM | SYSTOLIC BLOOD PRESSURE: 138 MMHG | HEART RATE: 94 BPM | HEIGHT: 65 IN | OXYGEN SATURATION: 98 %

## 2017-05-23 DIAGNOSIS — F10.20 ALCOHOLISM (HCC): ICD-10-CM

## 2017-05-23 LAB — POC BREATHALIZER: 0.25 PERCENT (ref 0–0.01)

## 2017-05-23 PROCEDURE — 99285 EMERGENCY DEPT VISIT HI MDM: CPT

## 2017-05-23 PROCEDURE — 700102 HCHG RX REV CODE 250 W/ 637 OVERRIDE(OP): Performed by: EMERGENCY MEDICINE

## 2017-05-23 PROCEDURE — A9270 NON-COVERED ITEM OR SERVICE: HCPCS | Performed by: EMERGENCY MEDICINE

## 2017-05-23 PROCEDURE — 302970 POC BREATHALIZER

## 2017-05-23 PROCEDURE — 90791 PSYCH DIAGNOSTIC EVALUATION: CPT

## 2017-05-23 RX ORDER — LORAZEPAM 1 MG/1
2 TABLET ORAL ONCE
Status: DISCONTINUED | OUTPATIENT
Start: 2017-05-23 | End: 2017-05-23 | Stop reason: DRUGHIGH

## 2017-05-23 RX ORDER — LORAZEPAM 1 MG/1
1 TABLET ORAL ONCE
Status: COMPLETED | OUTPATIENT
Start: 2017-05-23 | End: 2017-05-23

## 2017-05-23 RX ADMIN — LORAZEPAM 1 MG: 1 TABLET ORAL at 09:45

## 2017-05-23 ASSESSMENT — LIFESTYLE VARIABLES: SUBSTANCE_ABUSE: 1

## 2017-05-23 ASSESSMENT — ENCOUNTER SYMPTOMS
HEADACHES: 0
FEVER: 0
SHORTNESS OF BREATH: 0
DEPRESSION: 1
VOMITING: 0

## 2017-05-23 ASSESSMENT — PAIN SCALES - GENERAL: PAINLEVEL_OUTOF10: 4

## 2017-05-23 NOTE — ED AVS SNAPSHOT
Home Care Instructions                                                                                                                Ashleigh Marquis   MRN: [id number]    Department:  [facility], Emergency Dept   Date of Visit:  5/23/2017            [facility], Emergency Dept    [address] [facility] [address]    Phone:  [phone]      You were seen by     Le Morrell D.O.      Your Diagnosis Was     Alcoholism ([facility])     F10.20       These are the medications you received during your hospitalization from 05/23/2017 0447 to 05/23/2017 0948     Date/Time Order Dose Route Action    05/23/2017 0945 lorazepam (ATIVAN) tablet 1 mg 1 mg Oral Given      Follow-up Information     1. Follow up with [facility].    Contact information    [address] [facility], [address] [address]  [phone]        2. Follow up with [facility], Emergency Dept.    Specialty:  Emergency Medicine    Why:  If symptoms worsen    Contact information    [address] [address] [phone]      Medication Information     Review all of your home medications and newly ordered medications with your primary doctor and/or pharmacist as soon as possible. Follow medication instructions as directed by your doctor and/or pharmacist.     Please keep your complete medication list with you and share with your physician. Update the information when medications are discontinued, doses are changed, or new medications (including over-the-counter products) are added; and carry medication information at all times in the event of emergency situations.               Medication List      ASK your doctor about these medications        Instructions    Morning Afternoon Evening Bedtime    clonazepam 1 MG Tabs   Commonly known as:  KLONOPIN        Take 0.5 mg by mouth 2 times a day. Unknown dose   Dose:  0.5 mg                        fluoxetine 10 MG Caps   Commonly known  as:  PROZAC        Take 10 mg by mouth every day. Unknown dose   Dose:  10 mg                        lisinopril 10 MG Tabs   Commonly known as:  PRINIVIL        Take 10 mg by mouth.   Dose:  10 mg                        spironolactone 25 MG Tabs   Commonly known as:  ALDACTONE        Take 25 mg by mouth every day. Unknown dose   Dose:  25 mg                                Procedures and tests performed during your visit     POC BREATHALIZER        Discharge Instructions       Alcohol Problems  Most adults who drink alcohol drink in moderation (not a lot) are at low risk for developing problems related to their drinking. However, all drinkers, including low-risk drinkers, should know about the health risks connected with drinking alcohol.  RECOMMENDATIONS FOR LOW-RISK DRINKING   Drink in moderation. Moderate drinking is defined as follows:   · Men - no more than 2 drinks per day.  · Nonpregnant women - no more than 1 drink per day.  · Over age 65 - no more than 1 drink per day.  A standard drink is 12 grams of pure alcohol, which is equal to a 12 ounce bottle of beer or wine cooler, a 5 ounce glass of wine, or 1.5 ounces of distilled spirits (such as whiskey, gama, vodka, or rum).   ABSTAIN FROM (DO NOT DRINK) ALCOHOL:  · When pregnant or considering pregnancy.  · When taking a medication that interacts with alcohol.  · If you are alcohol dependent.  · A medical condition that prohibits drinking alcohol (such as ulcer, liver disease, or heart disease).  DISCUSS WITH YOUR CAREGIVER:  · If you are at risk for coronary heart disease, discuss the potential benefits and risks of alcohol use: Light to moderate drinking is associated with lower rates of coronary heart disease in certain populations (for example, men over age 45 and postmenopausal women). Infrequent or nondrinkers are advised not to begin light to moderate drinking to reduce the risk of coronary heart disease so as to avoid creating an alcohol-related  problem. Similar protective effects can likely be gained through proper diet and exercise.  · Women and the elderly have smaller amounts of body water than men. As a result women and the elderly achieve a higher blood alcohol concentration after drinking the same amount of alcohol.  · Exposing a fetus to alcohol can cause a broad range of birth defects referred to as Fetal Alcohol Syndrome (FAS) or Alcohol-Related Birth Defects (ARBD). Although FAS/ARBD is connected with excessive alcohol consumption during pregnancy, studies also have reported neurobehavioral problems in infants born to mothers reporting drinking an average of 1 drink per day during pregnancy.  · Heavier drinking (the consumption of more than 4 drinks per occasion by men and more than 3 drinks per occasion by women) impairs learning (cognitive) and psychomotor functions and increases the risk of alcohol-related problems, including accidents and injuries.  CAGE QUESTIONS:   · Have you ever felt that you should Cut down on your drinking?  · Have people Annoyed you by criticizing your drinking?  · Have you ever felt bad or Guilty about your drinking?  · Have you ever had a drink first thing in the morning to steady your nerves or get rid of a hangover (Eye opener)?  If you answered positively to any of these questions: You may be at risk for alcohol-related problems if alcohol consumption is:   · Men: Greater than 14 drinks per week or more than 4 drinks per occasion.  · Women: Greater than 7 drinks per week or more than 3 drinks per occasion.  Do you or your family have a medical history of alcohol-related problems, such as:  · Blackouts.  · Sexual dysfunction.  · Depression.  · Trauma.  · Liver dysfunction.  · Sleep disorders.  · Hypertension.  · Chronic abdominal pain.  · Has your drinking ever caused you problems, such as problems with your family, problems with your work (or school) performance, or accidents/injuries?  · Do you have a compulsion  to drink or a preoccupation with drinking?  · Do you have poor control or are you unable to stop drinking once you have started?  · Do you have to drink to avoid withdrawal symptoms?  · Do you have problems with withdrawal such as tremors, nausea, sweats, or mood disturbances?  · Does it take more alcohol than in the past to get you high?  · Do you feel a strong urge to drink?  · Do you change your plans so that you can have a drink?  · Do you ever drink in the morning to relieve the shakes or a hangover?  If you have answered a number of the previous questions positively, it may be time for you to talk to your caregivers, family, and friends and see if they think you have a problem. Alcoholism is a chemical dependency that keeps getting worse and will eventually destroy your health and relationships. Many alcoholics end up dead, impoverished, or in custodial. This is often the end result of all chemical dependency.  · Do not be discouraged if you are not ready to take action immediately.  · Decisions to change behavior often involve up and down desires to change and feeling like you cannot decide.  · Try to think more seriously about your drinking behavior.  · Think of the reasons to quit.  WHERE TO GO FOR ADDITIONAL INFORMATION   · The National [place] on Alcohol Abuse and Alcoholism (NIAAA)  www.niaaa.nih.gov  · National [place] on Alcoholism and Drug Dependence (NCADD)  www.ncadd.org  · American Society of Addiction Medicine (ASAM)  www.asam.org   Document Released: 12/18/2006 Document Revised: 03/11/2013 Document Reviewed: 08/05/2009  ExitCare® Patient Information ©2014 [employer] [facility].            Patient Information     Patient Information    Following emergency treatment: all patient requiring follow-up care must return either to a private physician or a clinic if your condition worsens before you are able to obtain further medical attention, please return to the emergency room.     Billing Information    At  [facility], we work to make the billing process streamlined for our patients.  Our Representatives are here to answer any questions you may have regarding your hospital bill.  If you have insurance coverage and have supplied your insurance information to us, we will submit a claim to your insurer on your behalf.  Should you have any questions regarding your bill, we can be reached online or by phone as follows:  Online: You are able pay your bills online or live chat with our representatives about any billing questions you may have. We are here to help Monday - Friday from 8:00am to 7:30pm and 9:00am - 12:00pm on Saturdays.  Please visit [email or web address]  for more information.   Phone:  [phone] or [phone]    Please note that your emergency physician, surgeon, pathologist, radiologist, anesthesiologist, and other specialists are not employed by [facility] and will therefore bill separately for their services.  Please contact them directly for any questions concerning their bills at the numbers below:     Emergency Physician Services:  [phone]  [facility]:  [phone]  [facility]:  [phone]  [facility]:  [phone]    1. Your final bill may vary from the amount quoted upon discharge if all procedures are not complete at that time, or if your doctor has additional procedures of which we are not aware. You will receive an additional bill if you return to the Emergency Department at [facility] for suture removal regardless of the facility of which the sutures were placed.     2. Please arrange for settlement of this account at the emergency registration.    3. All self-pay accounts are due in full at the time of treatment.  If you are unable to meet this obligation then payment is expected within 4-5 days.     4. If you have had radiology studies (CT, X-ray, Ultrasound, MRI), you have received a preliminary  result during your emergency department visit. Please contact the radiology department [phone] to receive a copy of your final result. Please discuss the Final result with your primary physician or with the follow up physician provided.     Crisis Hotline:  [place] Crisis Hotline:  1-217-JUCGUZU or 1-172.834.5565  [place] Crisis Hotline:    1-158.785.3311 or 915-707-3734         ED Discharge Follow Up Questions    1. In order to provide you with very good care, we would like to follow up with a phone call in the next few days.  May we have your permission to contact you?     YES /  NO    2. What is the best phone number to call you? (       )_____-__________    3. What is the best time to call you?      Morning  /  Afternoon  /  Evening                   Patient Signature:  ____________________________________________________________    Date:  ____________________________________________________________

## 2017-05-23 NOTE — ED NOTES
"Patient to triage with EMS via wheelchair:  Chief Complaint   Patient presents with   • Alcohol Intoxication     \"admits to drinking all day for 2 weeks, no food\"     Patient with unsteady gait and slurred speech, reports \"I want help to stop drinking, my daughter loves me\".    Explained wait time and triage process to pt. Pt placed back out in lobby, told to notify ED tech or triage RN of any changes.       "

## 2017-05-23 NOTE — ED AVS SNAPSHOT
5/23/2017    Ashleigh Marquis  [facility] [address]    Dear Ashleigh:    [facility] wants to ensure your discharge home is safe and you or your loved ones have had all of your questions answered regarding your care after you leave the hospital.    Below is a list of resources and contact information should you have any questions regarding your hospital stay, follow-up instructions, or active medical symptoms.    Questions or Concerns Regarding… Contact   Medical Questions Related to Your Discharge  (7 days a week, 8am-5pm) Contact a Nurse Care Coordinator   [phone]   Medical Questions Not Related to Your Discharge  (24 hours a day / 7 days a week)  Contact the Nurse Health Line   [phone]    Medications or Discharge Instructions Refer to your discharge packet   or contact your [facility] Primary Care Provider   [phone]   Follow-up Appointment(s) Schedule your appointment via [employer]   or contact Scheduling [phone]   Billing Review your statement via [employer]  or contact Billing [phone]   Medical Records Review your records via [employer]   or contact Medical Records [phone]     You may receive a telephone call within two days of discharge. This call is to make certain you understand your discharge instructions and have the opportunity to have any questions answered. You can also easily access your medical information, test results and upcoming appointments via the BeThere[employer]ards free online health management tool. You can learn more and sign up at [employer]BeThere[employer]. For assistance setting up your BeThere[employer] account, please call [phone].    Once again, we want to ensure your discharge home is safe and that you have a clear understanding of any next steps in your care. If you have any questions or concerns, please do not hesitate to contact us, we are here for you. Thank you for choosing [facility] for your healthcare needs.    Sincerely,    Your [facility] Healthcare Team

## 2017-05-23 NOTE — ED AVS SNAPSHOT
Virtual Event Bags Access Code: Activation code not generated  Current Virtual Event Bags Status: Patient Declined    Your email address is not on file at [email or web address].  Email Addresses are required for you to sign up for Virtual Event Bags, please contact [phone] to verify your personal information and to provide your email address prior to attempting to register for Virtual Event Bags.    Ashleigh Long [place]  [address] [address]    Virtual Event Bags  A secure, online tool to manage your health information     [email or web address]’s Virtual Event Bags® is a secure, online tool that connects you to your personalized health information from the privacy of your home -- day or night - making it very easy for you to manage your healthcare. Once the activation process is completed, you can even access your medical information using the Virtual Event Bags kiel, which is available for free in the Apple Kiel store or Google Play store.     To learn more about Virtual Event Bags, visit [email or web address]    There are two levels of access available (as shown below):   My Chart Features  [facility] Primary Care Doctor [facility]  Specialists [facility]  Urgent  Care Non-[facility] Primary Care Doctor   Email your healthcare team securely and privately 24/7 X X X    Manage appointments: schedule your next appointment; view details of past/upcoming appointments X      Request prescription refills. X      View recent personal medical records, including lab and immunizations X X X X   View health record, including health history, allergies, medications X X X X   Read reports about your outpatient visits, procedures, consult and ER notes X X X X   See your discharge summary, which is a recap of your hospital and/or ER visit that includes your diagnosis, lab results, and care plan X X  X     How to register for [employer]:  Once your e-mail address has been verified, follow the following steps to sign up for [employer].     1. Go to  [email or web address]  2. Click on the Sign Up Now box, which takes you to the New  Member Sign Up page. You will need to provide the following information:  a. Enter your [email or web address] Access Code exactly as it appears at the top of this page. (You will not need to use this code after you’ve completed the sign-up process. If you do not sign up before the expiration date, you must request a new code.)   b. Enter your date of birth.   c. Enter your home email address.   d. Click Submit, and follow the next screen’s instructions.  3. Create a Minteost ID. This will be your [email or web address] login ID and cannot be changed, so think of one that is secure and easy to remember.  4. Create a [email or web address]com password. You can change your password at any time.  5. Enter your Password Reset Question and Answer. This can be used at a later time if you forget your password.   6. Enter your e-mail address. This allows you to receive e-mail notifications when new information is available in [email or web address].  7. Click Sign Up. You can now view your health information.    For assistance activating your [email or web address] account, call [phone]

## 2017-05-23 NOTE — ED PROVIDER NOTES
"ED Provider Note    Scribed for Le Morrell D.O. by Ray Humphrey. 5/23/2017, 7:01 AM.    Primary care provider: [facility]  Means of arrival: EMS  History obtained from: Patient  History limited by: None    CHIEF COMPLAINT  Chief Complaint   Patient presents with   • Alcohol Intoxication     \"admits to drinking all day for 2 weeks, no food\"       HPI  Ashleigh Marquis is a 56 y.o. Female with a history of alcoholism who presents to the Emergency Department for evaluation of alcohol intoxication. Patient reportedly has been drinking all day for the last two weeks without PO food intake. She informed nursing that she would like help to stop drinking. Patient has visited here before for alcohol intoxication. She has a history of hypertension and is non compliant with her hypertension medication. She denies any pain. No recent trauma.    REVIEW OF SYSTEMS  Review of Systems   Constitutional: Negative for fever.        Positive for intoxicated   Respiratory: Negative for shortness of breath.    Cardiovascular: Negative for chest pain.   Gastrointestinal: Negative for vomiting.   Neurological: Negative for headaches.   Psychiatric/Behavioral: Positive for depression and substance abuse. Negative for suicidal ideas.   E    PAST MEDICAL HISTORY   has a past medical history of Hypertension; Vitamin D deficiency; Chronic low back pain; Muscle disorder; Anxiety; OSTEOPOROSIS; Arthritis; GERD (gastroesophageal reflux disease); Ulcer (CMS-HCC); ASTHMA; HTN; Cancer (CMS-[facility]) (1981); Renal disorder; Indigestion; Congestive heart failure (CMS-HCC); Psychiatric disorder; PTSD (post-traumatic stress disorder); Depression; Seizure (CMS-HCC); Other specified symptom associated with female genital organs; Fall; Alcoholism (CMS-[facility]); and EtOH dependence (CMS-HCC).    SURGICAL HISTORY   has past surgical history that includes hernia repair (1977); cysto stent placemnt pre surg (10/7/2010); cystoscopy stent placement " "(11/9/2010); ureteroscopy (11/9/2010); lasertripsy (11/9/2010); and stent removal (11/9/2010).    SOCIAL HISTORY  Social History   Substance Use Topics   • Smoking status: Current Every Day Smoker -- 0.50 packs/day for 20 years     Types: Cigarettes   • Smokeless tobacco: Never Used      Comment: 1/2 pack per day   • Alcohol Use: Yes      Comment: 1/5th of vodka a day with beer      History   Drug Use No     FAMILY HISTORY  Family History   Problem Relation Age of Onset   • Heart Disease Father    • Hypertension Father    • Diabetes Father    • Cancer Paternal Grandmother    • Depression Other    • Lung Disease Neg Hx    • Stroke Neg Hx      CURRENT MEDICATIONS  Home Medications     Reviewed by Yaz Pizarro R.N. (Registered Nurse) on 05/23/17 at [facility]  Med List Status: Partial    Medication Last Dose Status    clonazepam (KLONOPIN) 1 MG Tab 3/8/2017 Active    fluoxetine (PROZAC) 10 MG Cap 3/8/2017 Active    lisinopril (PRINIVIL) 10 MG Tab 3/8/2017 Active    spironolactone (ALDACTONE) 25 MG Tab 3/8/2017 Active              ALLERGIES  Allergies   Allergen Reactions   • Sulfa Drugs Vomiting   • Toradol Vomiting   • Neurontin [Gabapentin]      Bowel incontinence       PHYSICAL EXAM  VITAL SIGNS: /96 mmHg  Pulse 96  Temp(Src) 36.3 °C (97.3 °F)  Resp 16  Ht 1.651 m (5' 5\")  Wt 76.8 kg (169 lb 5 oz)  BMI 28.18 kg/m2  SpO2 97%  Vitals reviewed.  Constitutional: Patient is oriented to person, place, and time. Intoxicated. Disheveled, but calm and cooperative.  No distress.    Cardiovascular: Normal rate, regular rhythm and normal heart sounds.   Pulmonary/Chest: Effort normal and breath sounds normal. No respiratory distress, no wheezes, rhonchi, or rales.  Abdomen: Soft, nondistended normal bowel sounds  Musculoskeletal: No edema and no tenderness.   Skin: Skin is warm and dry. No erythema. No pallor.   Psychiatric: Patient has a normal mood and affect.     LABS  Results for orders placed or performed " "during the hospital encounter of 05/23/17   POC BREATHALIZER   Result Value Ref Range    POC Breathalizer 0.25 (A) 0.00 - 0.01 Percent     All labs reviewed by me.    COURSE & MEDICAL DECISION MAKING  Nursing notes, VS, PMSFHx reviewed in chart.    Obtained and reviewed past medical records which indicate multiple visits here for alcohol intoxication.    7:01 AM - Patient seen and examined at bedside. She is still intoxicated at this time. I will wait until she has become sober for further evaluation. Patient will be treated with Ativan tablet 2 mg. Ordered POC breathalizer to evaluate her symptoms.     Vision was able to walk. She is steady on her feet. She is speaking clearly.    9:08 AM Nursing informed me that [facility] is arranging transfer to [facility] and that the patient is ambulating.    10:17 AM - Re-examined; The patient is resting in bed comfortably. I discussed her above findings were overall unremarkable and plans for discharge as outlined in above and instructed to return to the ED if her symptoms worsen. Patient understands and agrees. Her vitals prior to discharge are: /89 mmHg  Pulse 94  Temp(Src) 36.3 °C (97.3 °F)  Resp 16  Ht 1.651 m (5' 5\")  Wt 76.8 kg (169 lb 5 oz)  BMI 28.18 kg/m2  SpO2 98%    The patient will return for new or worsening symptoms and is stable at the time of discharge.    The patient is referred to a primary physician for blood pressure management, diabetic screening, and for all other preventative health concerns.    DISPOSITION:  Patient will be discharged home in stable condition.    FOLLOW UP:  [facility]  [address], [address] [address] [phone]  Today      [facility], Emergency Dept  [address] [address] [phone]    If symptoms worsen    FINAL IMPRESSION  1. Alcoholism (CMS-HCC)        I, Ray Humphrey (Scribe), am scribing for, and in the presence of, Le Morrell, " [email or web address].    Electronically signed by: Ray Humphrey (Scribe), 5/23/2017    ILe D.O. personally performed the services described in this documentation, as scribed by Ray Humphrey in my presence, and it is both accurate and complete.    The note accurately reflects work and decisions made by me.  Le Morrell  5/23/2017  11:30 AM

## 2017-05-23 NOTE — ED NOTES
Pt verbalizes understanding of discharge instructions. Patient ambulatory to discharge with steady gait to lobby to be picked up by taxi for transport to [facility]
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
## 2017-06-03 ENCOUNTER — HOSPITAL ENCOUNTER (EMERGENCY)
Facility: MEDICAL CENTER | Age: 57
End: 2017-06-04
Attending: EMERGENCY MEDICINE
Payer: MEDICAID

## 2017-06-03 ENCOUNTER — APPOINTMENT (OUTPATIENT)
Dept: RADIOLOGY | Facility: MEDICAL CENTER | Age: 57
End: 2017-06-03
Attending: EMERGENCY MEDICINE
Payer: MEDICAID

## 2017-06-03 DIAGNOSIS — F10.10 ALCOHOL ABUSE: ICD-10-CM

## 2017-06-03 DIAGNOSIS — S00.83XA CONTUSION OF OTHER PART OF HEAD, INITIAL ENCOUNTER: ICD-10-CM
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
## 2017-06-03 PROCEDURE — 72170 X-RAY EXAM OF PELVIS: CPT

## 2017-06-03 PROCEDURE — 700102 HCHG RX REV CODE 250 W/ 637 OVERRIDE(OP): Performed by: EMERGENCY MEDICINE

## 2017-06-03 PROCEDURE — A9270 NON-COVERED ITEM OR SERVICE: HCPCS | Performed by: EMERGENCY MEDICINE

## 2017-06-03 PROCEDURE — 99285 EMERGENCY DEPT VISIT HI MDM: CPT

## 2017-06-03 RX ORDER — LORAZEPAM 1 MG/1
1 TABLET ORAL ONCE
Status: COMPLETED | OUTPATIENT
Start: 2017-06-03 | End: 2017-06-03

## 2017-06-03 RX ORDER — IBUPROFEN 600 MG/1
600 TABLET ORAL ONCE
Status: COMPLETED | OUTPATIENT
Start: 2017-06-03 | End: 2017-06-03

## 2017-06-03 RX ORDER — ACETAMINOPHEN 325 MG/1
650 TABLET ORAL ONCE
Status: COMPLETED | OUTPATIENT
Start: 2017-06-03 | End: 2017-06-03

## 2017-06-03 RX ADMIN — LORAZEPAM 1 MG: 1 TABLET ORAL at 22:20

## 2017-06-03 RX ADMIN — ACETAMINOPHEN 650 MG: 325 TABLET, FILM COATED ORAL at 22:19

## 2017-06-03 RX ADMIN — IBUPROFEN 600 MG: 600 TABLET, FILM COATED ORAL at 22:19

## 2017-06-03 ASSESSMENT — LIFESTYLE VARIABLES
DO YOU DRINK ALCOHOL: YES
ON A TYPICAL DAY WHEN YOU DRINK ALCOHOL HOW MANY DRINKS DO YOU HAVE: 6
EVER HAD A DRINK FIRST THING IN THE MORNING TO STEADY YOUR NERVES TO GET RID OF A HANGOVER: NO
HAVE PEOPLE ANNOYED YOU BY CRITICIZING YOUR DRINKING: NO
AVERAGE NUMBER OF DAYS PER WEEK YOU HAVE A DRINK CONTAINING ALCOHOL: 6
CONSUMPTION TOTAL: INCOMPLETE
HAVE YOU EVER FELT YOU SHOULD CUT DOWN ON YOUR DRINKING: NO
HOW MANY TIMES IN THE PAST YEAR HAVE YOU HAD 5 OR MORE DRINKS IN A DAY: 10

## 2017-06-03 ASSESSMENT — PAIN SCALES - GENERAL: PAINLEVEL_OUTOF10: 8
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
## 2017-06-03 NOTE — ED AVS SNAPSHOT
Datalot Access Code: Activation code not generated  Current Datalot Status: Patient Declined    Your email address is not on file at [facility].  Email Addresses are required for you to sign up for Datalot, please contact [phone] to verify your personal information and to provide your email address prior to attempting to register for Datalot.    Ashleigh [place] [address]    Datalot  A secure, online tool to manage your health information     [facility]’s Datalot® is a secure, online tool that connects you to your personalized health information from the privacy of your home -- day or night - making it very easy for you to manage your healthcare. Once the activation process is completed, you can even access your medical information using the Datalot kiel, which is available for free in the Apple Kiel store or Google Play store.     To learn more about Datalot, visit [email or web address]    There are two levels of access available (as shown below):   My Chart Features  [facility] Primary Care Doctor [facility]  Specialists [facility]  Urgent  Care Non-[facility] Primary Care Doctor   Email your healthcare team securely and privately 24/7 X X X    Manage appointments: schedule your next appointment; view details of past/upcoming appointments X      Request prescription refills. X      View recent personal medical records, including lab and immunizations X X X X   View health record, including health history, allergies, medications X X X X   Read reports about your outpatient visits, procedures, consult and ER notes X X X X   See your discharge summary, which is a recap of your hospital and/or ER visit that includes your diagnosis, lab results, and care plan X X  X     How to register for BitPasst:  Once your e-mail address has been verified, follow the following steps to sign up for BitPasst.     1. Go to  [email or web address]  2. Click on the Sign Up Now box, which takes you to the New  Member Sign Up page. You will need to provide the following information:  a. Enter your Silverado Access Code exactly as it appears at the top of this page. (You will not need to use this code after you’ve completed the sign-up process. If you do not sign up before the expiration date, you must request a new code.)   b. Enter your date of birth.   c. Enter your home email address.   d. Click Submit, and follow the next screen’s instructions.  3. Create a Nanofiber Solutionst ID. This will be your Silverado login ID and cannot be changed, so think of one that is secure and easy to remember.  4. Create a Silverado password. You can change your password at any time.  5. Enter your Password Reset Question and Answer. This can be used at a later time if you forget your password.   6. Enter your e-mail address. This allows you to receive e-mail notifications when new information is available in Silverado.  7. Click Sign Up. You can now view your health information.    For assistance activating your Silverado account, call [phone]

## 2017-06-03 NOTE — ED AVS SNAPSHOT
6/4/2017    Ashleigh Marquis  [facility] [address]    Dear Ashleigh:    [facility] wants to ensure your discharge home is safe and you or your loved ones have had all of your questions answered regarding your care after you leave the hospital.    Below is a list of resources and contact information should you have any questions regarding your hospital stay, follow-up instructions, or active medical symptoms.    Questions or Concerns Regarding… Contact   Medical Questions Related to Your Discharge  (7 days a week, 8am-5pm) Contact a Nurse Care Coordinator   [phone]   Medical Questions Not Related to Your Discharge  (24 hours a day / 7 days a week)  Contact the Nurse Health Line   [phone]    Medications or Discharge Instructions Refer to your discharge packet   or contact your [facility] Primary Care Provider   [phone]   Follow-up Appointment(s) Schedule your appointment via [email or web address]   or contact Scheduling [phone]   Billing Review your statement via [email or web address]  or contact Billing [phone]   Medical Records Review your records via [email or web address]   or contact Medical Records [phone]     You may receive a telephone call within two days of discharge. This call is to make certain you understand your discharge instructions and have the opportunity to have any questions answered. You can also easily access your medical information, test results and upcoming appointments via the [email or web address].com free online health management tool. You can learn more and sign up at [email or web address]. For assistance setting up your [email or web address] account, please call [phone].    Once again, we want to ensure your discharge home is safe and that you have a clear understanding of any next steps in your care. If you have any questions or concerns, please do not hesitate to contact us, we are here for you. Thank you for choosing [facility] for your healthcare needs.    Sincerely,    Your [facility] Healthcare Team

## 2017-06-04 VITALS
WEIGHT: 165 LBS | RESPIRATION RATE: 16 BRPM | BODY MASS INDEX: 25.9 KG/M2 | HEIGHT: 67 IN | SYSTOLIC BLOOD PRESSURE: 118 MMHG | TEMPERATURE: 98.1 F | HEART RATE: 66 BPM | DIASTOLIC BLOOD PRESSURE: 71 MMHG | OXYGEN SATURATION: 94 %

## 2017-06-04 PROCEDURE — 72125 CT NECK SPINE W/O DYE: CPT

## 2017-06-04 PROCEDURE — 70450 CT HEAD/BRAIN W/O DYE: CPT

## 2017-06-04 PROCEDURE — 70486 CT MAXILLOFACIAL W/O DYE: CPT

## 2017-06-04 ASSESSMENT — PAIN SCALES - GENERAL: PAINLEVEL_OUTOF10: 5
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
## 2017-06-04 NOTE — ED NOTES
Pt laying in bed and yelling out. Pt demands to have food and pain medications. Informed patient the doctor needs to see her first. Pt yells obscenities to staff and security. Security attempted to talk with patient and she pretended to sleep.

## 2017-06-04 NOTE — ED NOTES
Break RN: KOLTON at bedside to discuss discharge. Discharge orders received, instructions and education given, follow-up discussed, resources to contact PD with pt, pt verbalized understanding. Pt AOx4. Pt ambulated out to front lobby without assistance, gait steady.
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
## 2017-06-04 NOTE — DISCHARGE INSTRUCTIONS
Facial or Scalp Contusion  A facial or scalp contusion is a deep bruise on the face or head. Injuries to the face and head generally cause a lot of swelling, especially around the eyes. Contusions are the result of an injury that caused bleeding under the skin. The contusion may turn blue, purple, or yellow. Minor injuries will give you a painless contusion, but more severe contusions may stay painful and swollen for a few weeks.   CAUSES   A facial or scalp contusion is caused by a blunt injury or trauma to the face or head area.   SIGNS AND SYMPTOMS   · Swelling of the injured area.    · Discoloration of the injured area.    · Tenderness, soreness, or pain in the injured area.    DIAGNOSIS   The diagnosis can be made by taking a medical history and doing a physical exam. An X-ray exam, CT scan, or MRI may be needed to determine if there are any associated injuries, such as broken bones (fractures).  TREATMENT   Often, the best treatment for a facial or scalp contusion is applying cold compresses to the injured area. Over-the-counter medicines may also be recommended for pain control.   HOME CARE INSTRUCTIONS   · Only take over-the-counter or prescription medicines as directed by your health care provider.    · Apply ice to the injured area.    ¨ Put ice in a plastic bag.    ¨ Place a towel between your skin and the bag.    ¨ Leave the ice on for 20 minutes, 2-3 times a day.    SEEK MEDICAL CARE IF:  · You have bite problems.    · You have pain with chewing.    · You are concerned about facial defects.  SEEK IMMEDIATE MEDICAL CARE IF:  · You have severe pain or a headache that is not relieved by medicine.    · You have unusual sleepiness, confusion, or personality changes.    · You throw up (vomit).    · You have a persistent nosebleed.    · You have double vision or blurred vision.    · You have fluid drainage from your nose or ear.    · You have difficulty walking or using your arms or legs.    MAKE SURE YOU:    · Understand these instructions.  · Will watch your condition.  · Will get help right away if you are not doing well or get worse.     This information is not intended to replace advice given to you by your health care provider. Make sure you discuss any questions you have with your health care provider.     Document Released: 01/25/2006 Document Revised: 01/08/2016 Document Reviewed: 07/31/2014  Elsevier Interactive Patient Education ©2016 Elsevier Inc.
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
## 2017-06-05 ENCOUNTER — HOSPITAL ENCOUNTER (EMERGENCY)
Facility: MEDICAL CENTER | Age: 57
End: 2017-06-06
Attending: EMERGENCY MEDICINE
Payer: MEDICAID

## 2017-06-05 DIAGNOSIS — F10.929 ACUTE ALCOHOL INTOXICATION, WITH UNSPECIFIED COMPLICATION (HCC): ICD-10-CM

## 2017-06-05 PROCEDURE — 99284 EMERGENCY DEPT VISIT MOD MDM: CPT

## 2017-06-05 RX ORDER — IBUPROFEN 600 MG/1
600 TABLET ORAL ONCE
Status: DISCONTINUED | OUTPATIENT
Start: 2017-06-05 | End: 2017-06-06 | Stop reason: HOSPADM

## 2017-06-05 NOTE — ED AVS SNAPSHOT
Numerous Access Code: Activation code not generated  Current Numerous Status: Patient Declined    Your email address is not on file at [facility].  Email Addresses are required for you to sign up for Numerous, please contact [phone] to verify your personal information and to provide your email address prior to attempting to register for Numerous.    Ashleigh Marquis  [address] [place] [address]    PopularMediat  A secure, online tool to manage your health information     [facility]’s Numerous® is a secure, online tool that connects you to your personalized health information from the privacy of your home -- day or night - making it very easy for you to manage your healthcare. Once the activation process is completed, you can even access your medical information using the Numerous kiel, which is available for free in the Apple Kiel store or Google Play store.     To learn more about Numerous, visit [email or web address]    There are two levels of access available (as shown below):   My Chart Features  [facility] Primary Care Doctor [facility]  Specialists [facility]  Urgent  Care Non-[facility] Primary Care Doctor   Email your healthcare team securely and privately 24/7 X X X    Manage appointments: schedule your next appointment; view details of past/upcoming appointments X      Request prescription refills. X      View recent personal medical records, including lab and immunizations X X X X   View health record, including health history, allergies, medications X X X X   Read reports about your outpatient visits, procedures, consult and ER notes X X X X   See your discharge summary, which is a recap of your hospital and/or ER visit that includes your diagnosis, lab results, and care plan X X  X     How to register for PopularMediat:  Once your e-mail address has been verified, follow the following steps to sign up for PopularMediat.     1. Go to  [email or web address]  2. Click on the Sign Up Now box, which takes you to the New Member  Sign Up page. You will need to provide the following information:  a. Enter your Spinn[employer] Access Code exactly as it appears at the top of this page. (You will not need to use this code after you’ve completed the sign-up process. If you do not sign up before the expiration date, you must request a new code.)   b. Enter your date of birth.   c. Enter your home email address.   d. Click Submit, and follow the next screen’s instructions.  3. Create a Sp[employer] ID. This will be your [employer] login ID and cannot be changed, so think of one that is secure and easy to remember.  4. Create a [employer] password. You can change your password at any time.  5. Enter your Password Reset Question and Answer. This can be used at a later time if you forget your password.   6. Enter your e-mail address. This allows you to receive e-mail notifications when new information is available in [employer].  7. Click Sign Up. You can now view your health information.    For assistance activating your [employer] account, call [phone]

## 2017-06-05 NOTE — ED AVS SNAPSHOT
6/6/2017    Ashleigh Marquis  [address] [place] [address]    Dear Ashleigh:    [facility] wants to ensure your discharge home is safe and you or your loved ones have had all of your questions answered regarding your care after you leave the hospital.    Below is a list of resources and contact information should you have any questions regarding your hospital stay, follow-up instructions, or active medical symptoms.    Questions or Concerns Regarding… Contact   Medical Questions Related to Your Discharge  (7 days a week, 8am-5pm) Contact a Nurse Care Coordinator   [phone]   Medical Questions Not Related to Your Discharge  (24 hours a day / 7 days a week)  Contact the Nurse Health Line   [phone]    Medications or Discharge Instructions Refer to your discharge packet   or contact your [facility] Primary Care Provider   [phone]   Follow-up Appointment(s) Schedule your appointment via gogamingo   or contact Scheduling [phone]   Billing Review your statement via gogamingo  or contact Billing [phone]   Medical Records Review your records via gogamingo   or contact Medical Records [phone]     You may receive a telephone call within two days of discharge. This call is to make certain you understand your discharge instructions and have the opportunity to have any questions answered. You can also easily access your medical information, test results and upcoming appointments via the gogamingo free online health management tool. You can learn more and sign up at [email or web address]. For assistance setting up your gogamingo account, please call [phone].    Once again, we want to ensure your discharge home is safe and that you have a clear understanding of any next steps in your care. If you have any questions or concerns, please do not hesitate to contact us, we are here for you. Thank you for choosing [facility] for your healthcare needs.    Sincerely,    Your [facility] Healthcare Team

## 2017-06-06 VITALS
HEART RATE: 89 BPM | HEIGHT: 66 IN | OXYGEN SATURATION: 92 % | WEIGHT: 200 LBS | DIASTOLIC BLOOD PRESSURE: 65 MMHG | BODY MASS INDEX: 32.14 KG/M2 | SYSTOLIC BLOOD PRESSURE: 103 MMHG | TEMPERATURE: 97.5 F
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
## 2017-06-06 NOTE — ED NOTES
Pt sleeping. ERP and RN discussed POC. PT ok to DC when able to ambulate independently and is safe with get to her home without assistance.
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
## 2017-06-06 NOTE — DISCHARGE INSTRUCTIONS
Alcohol Problems  Most adults who drink alcohol drink in moderation (not a lot) are at low risk for developing problems related to their drinking. However, all drinkers, including low-risk drinkers, should know about the health risks connected with drinking alcohol.  RECOMMENDATIONS FOR LOW-RISK DRINKING   Drink in moderation. Moderate drinking is defined as follows:   · Men - no more than 2 drinks per day.  · Nonpregnant women - no more than 1 drink per day.  · Over age 65 - no more than 1 drink per day.  A standard drink is 12 grams of pure alcohol, which is equal to a 12 ounce bottle of beer or wine cooler, a 5 ounce glass of wine, or 1.5 ounces of distilled spirits (such as whiskey, gama, vodka, or rum).   ABSTAIN FROM (DO NOT DRINK) ALCOHOL:  · When pregnant or considering pregnancy.  · When taking a medication that interacts with alcohol.  · If you are alcohol dependent.  · A medical condition that prohibits drinking alcohol (such as ulcer, liver disease, or heart disease).  DISCUSS WITH YOUR CAREGIVER:  · If you are at risk for coronary heart disease, discuss the potential benefits and risks of alcohol use: Light to moderate drinking is associated with lower rates of coronary heart disease in certain populations (for example, men over age 45 and postmenopausal women). Infrequent or nondrinkers are advised not to begin light to moderate drinking to reduce the risk of coronary heart disease so as to avoid creating an alcohol-related problem. Similar protective effects can likely be gained through proper diet and exercise.  · Women and the elderly have smaller amounts of body water than men. As a result women and the elderly achieve a higher blood alcohol concentration after drinking the same amount of alcohol.  · Exposing a fetus to alcohol can cause a broad range of birth defects referred to as Fetal Alcohol Syndrome (FAS) or Alcohol-Related Birth Defects (ARBD). Although FAS/ARBD is connected with excessive  alcohol consumption during pregnancy, studies also have reported neurobehavioral problems in infants born to mothers reporting drinking an average of 1 drink per day during pregnancy.  · Heavier drinking (the consumption of more than 4 drinks per occasion by men and more than 3 drinks per occasion by women) impairs learning (cognitive) and psychomotor functions and increases the risk of alcohol-related problems, including accidents and injuries.  CAGE QUESTIONS:   · Have you ever felt that you should Cut down on your drinking?  · Have people Annoyed you by criticizing your drinking?  · Have you ever felt bad or Guilty about your drinking?  · Have you ever had a drink first thing in the morning to steady your nerves or get rid of a hangover (Eye opener)?  If you answered positively to any of these questions: You may be at risk for alcohol-related problems if alcohol consumption is:   · Men: Greater than 14 drinks per week or more than 4 drinks per occasion.  · Women: Greater than 7 drinks per week or more than 3 drinks per occasion.  Do you or your family have a medical history of alcohol-related problems, such as:  · Blackouts.  · Sexual dysfunction.  · Depression.  · Trauma.  · Liver dysfunction.  · Sleep disorders.  · Hypertension.  · Chronic abdominal pain.  · Has your drinking ever caused you problems, such as problems with your family, problems with your work (or school) performance, or accidents/injuries?  · Do you have a compulsion to drink or a preoccupation with drinking?  · Do you have poor control or are you unable to stop drinking once you have started?  · Do you have to drink to avoid withdrawal symptoms?  · Do you have problems with withdrawal such as tremors, nausea, sweats, or mood disturbances?  · Does it take more alcohol than in the past to get you high?  · Do you feel a strong urge to drink?  · Do you change your plans so that you can have a drink?  · Do you ever drink in the morning to relieve  the shakes or a hangover?  If you have answered a number of the previous questions positively, it may be time for you to talk to your caregivers, family, and friends and see if they think you have a problem. Alcoholism is a chemical dependency that keeps getting worse and will eventually destroy your health and relationships. Many alcoholics end up dead, impoverished, or in detention. This is often the end result of all chemical dependency.  · Do not be discouraged if you are not ready to take action immediately.  · Decisions to change behavior often involve up and down desires to change and feeling like you cannot decide.  · Try to think more seriously about your drinking behavior.  · Think of the reasons to quit.  WHERE TO GO FOR ADDITIONAL INFORMATION   · The National [place] on Alcohol Abuse and Alcoholism (NIAAA)  www.niaaa.nih.gov  · National [place] on Alcoholism and Drug Dependence (NCADD)  www.ncadd.org  · American Society of Addiction Medicine (ASAM)  www.asam.org   Document Released: 12/18/2006 Document Revised: 03/11/2013 Document Reviewed: 08/05/2009  ExitCare® Patient Information ©2014 [employer].

## 2017-06-06 NOTE — ED NOTES
Pt brought over to [facility] via [employer]. ERP was in to see Pt. Pt intoxicated but opens eyes and follows commands. Per ERP, will let Pt sober up and then call SARS.
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
## 2017-06-27 ENCOUNTER — HOSPITAL ENCOUNTER (EMERGENCY)
Facility: MEDICAL CENTER | Age: 57
End: 2017-06-27
Attending: EMERGENCY MEDICINE
Payer: MEDICAID

## 2017-06-27 VITALS
WEIGHT: 180 LBS | RESPIRATION RATE: 16 BRPM | DIASTOLIC BLOOD PRESSURE: 67 MMHG | SYSTOLIC BLOOD PRESSURE: 124 MMHG | TEMPERATURE: 98.1 F | OXYGEN SATURATION: 97 % | BODY MASS INDEX: 29.07 KG/M2 | HEART RATE: 87 BPM

## 2017-06-27 DIAGNOSIS — F10.920 ACUTE ALCOHOL INTOXICATION, UNCOMPLICATED (HCC): ICD-10-CM

## 2017-06-27 LAB — ETHANOL BLD-MCNC: 0.37 G/DL

## 2017-06-27 PROCEDURE — 36415 COLL VENOUS BLD VENIPUNCTURE: CPT

## 2017-06-27 PROCEDURE — 80307 DRUG TEST PRSMV CHEM ANLYZR: CPT

## 2017-06-27 PROCEDURE — 99285 EMERGENCY DEPT VISIT HI MDM: CPT

## 2017-06-27 ASSESSMENT — PAIN SCALES - GENERAL: PAINLEVEL_OUTOF10: 0
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
## 2017-06-27 NOTE — ED AVS SNAPSHOT
6/27/2017    Ashleigh Marquis  [address] [place] [address]    Dear Ashleigh:    [facility] wants to ensure your discharge home is safe and you or your loved ones have had all of your questions answered regarding your care after you leave the hospital.    Below is a list of resources and contact information should you have any questions regarding your hospital stay, follow-up instructions, or active medical symptoms.    Questions or Concerns Regarding… Contact   Medical Questions Related to Your Discharge  (7 days a week, 8am-5pm) Contact a Nurse Care Coordinator   [phone]   Medical Questions Not Related to Your Discharge  (24 hours a day / 7 days a week)  Contact the Nurse Health Line   [phone]    Medications or Discharge Instructions Refer to your discharge packet   or contact your [facility] Primary Care Provider   [phone]   Follow-up Appointment(s) Schedule your appointment via [employer]   or contact Scheduling [phone]   Billing Review your statement via [employer]  or contact Billing [phone]   Medical Records Review your records via [employer] Learning   or contact Medical Records [phone]     You may receive a telephone call within two days of discharge. This call is to make certain you understand your discharge instructions and have the opportunity to have any questions answered. You can also easily access your medical information, test results and upcoming appointments via the [employer] Learning free online health management tool. You can learn more and sign up at [employer]/Open Learning. For assistance setting up your [employer] Learning account, please call [phone].    Once again, we want to ensure your discharge home is safe and that you have a clear understanding of any next steps in your care. If you have any questions or concerns, please do not hesitate to contact us, we are here for you. Thank you for choosing [facility] for your healthcare needs.    Sincerely,    Your [facility] Healthcare Team

## 2017-06-27 NOTE — DISCHARGE PLANNING
Numerous referrals to [facility], cabs, bus passes have been given and no further will be given.  Pt may self present to [facility] when medically cleared.

## 2017-06-27 NOTE — ED AVS SNAPSHOT
DataPop Access Code: [email or web address]  Expires: 7/27/2017  7:57 PM    Your email address is not on file at [facility].  Email Addresses are required for you to sign up for DataPop, please contact [phone] to verify your personal information and to provide your email address prior to attempting to register for DataPop.    Ashleigh Marquis  [address] [place] [address]    DataPop  A secure, online tool to manage your health information     [employer]’s DataPop® is a secure, online tool that connects you to your personalized health information from the privacy of your home -- day or night - making it very easy for you to manage your healthcare. Once the activation process is completed, you can even access your medical information using the DataPop kiel, which is available for free in the Apple Kile store or Google Play store.     To learn more about DataPop, visit [email or web address]    There are two levels of access available (as shown below):   My Chart Features  [facility] Primary Care Doctor [facility]  Specialists [facility]  Urgent  Care Non-[facility] Primary Care Doctor   Email your healthcare team securely and privately 24/7 X X X    Manage appointments: schedule your next appointment; view details of past/upcoming appointments X      Request prescription refills. X      View recent personal medical records, including lab and immunizations X X X X   View health record, including health history, allergies, medications X X X X   Read reports about your outpatient visits, procedures, consult and ER notes X X X X   See your discharge summary, which is a recap of your hospital and/or ER visit that includes your diagnosis, lab results, and care plan X X  X     How to register for [employer]:  Once your e-mail address has been verified, follow the following steps to sign up for DataPop.     1. Go to  [email or web address]  2. Click on the Sign Up Now box, which takes you to the New Member Sign Up page. You will  need to provide the following information:  a. Enter your [employer] Access Code exactly as it appears at the top of this page. (You will not need to use this code after you’ve completed the sign-up process. If you do not sign up before the expiration date, you must request a new code.)   b. Enter your date of birth.   c. Enter your home email address.   d. Click Submit, and follow the next screen’s instructions.  3. Create a X-1t ID. This will be your [employer] login ID and cannot be changed, so think of one that is secure and easy to remember.  4. Create a [employer] password. You can change your password at any time.  5. Enter your Password Reset Question and Answer. This can be used at a later time if you forget your password.   6. Enter your e-mail address. This allows you to receive e-mail notifications when new information is available in [employer].  7. Click Sign Up. You can now view your health information.    For assistance activating your [employer] account, call [phone]

## 2017-06-27 NOTE — ED AVS SNAPSHOT
Home Care Instructions                                                                                                                Ashleigh Marquis   MRN: [id number]    Department:  [facility], Emergency Dept   Date of Visit:  6/27/2017            [facility], Emergency Dept    [address] [place] [id number]    Phone:  [phone]      You were seen by     Ronal Barber M.D.      Your Diagnosis Was     Acute alcohol intoxication, uncomplicated     F10.120       Follow-up Information     1. Follow up with [facility], Emergency Dept.    Specialty:  Emergency Medicine    Why:  If symptoms worsen, As needed    Contact information    [address] [place] [id number] [phone]        2. Schedule an appointment as soon as possible for a visit with [facility].    Contact information    [address] [place] [address] [phone]      Medication Information     Review all of your home medications and newly ordered medications with your primary doctor and/or pharmacist as soon as possible. Follow medication instructions as directed by your doctor and/or pharmacist.     Please keep your complete medication list with you and share with your physician. Update the information when medications are discontinued, doses are changed, or new medications (including over-the-counter products) are added; and carry medication information at all times in the event of emergency situations.               Medication List      ASK your doctor about these medications        Instructions    Morning Afternoon Evening Bedtime    clonazepam 1 MG Tabs   Commonly known as:  KLONOPIN        Take 0.5 mg by mouth 2 times a day. Unknown dose   Dose:  0.5 mg                        fluoxetine 10 MG Caps   Commonly known as:  PROZAC        Take 10 mg by mouth every day. Unknown dose   Dose:  10 mg                        lisinopril 10 MG Tabs   Commonly  "known as:  PRINIVIL        Take 10 mg by mouth.   Dose:  10 mg                        spironolactone 25 MG Tabs   Commonly known as:  ALDACTONE        Take 25 mg by mouth every day. Unknown dose   Dose:  25 mg                                Procedures and tests performed during your visit     DIAGNOSTIC ALCOHOL        Discharge Instructions       Alcohol Intoxication  Alcohol intoxication occurs when the amount of alcohol that a person has consumed impairs his or her ability to mentally and physically function. Alcohol directly impairs the normal chemical activity of the brain. Drinking large amounts of alcohol can lead to changes in mental function and behavior, and it can cause many physical effects that can be harmful.   Alcohol intoxication can range in severity from mild to very severe. Various factors can affect the level of intoxication that occurs, such as the person's age, gender, weight, frequency of alcohol consumption, and the presence of other medical conditions (such as diabetes, seizures, or heart conditions). Dangerous levels of alcohol intoxication may occur when people drink large amounts of alcohol in a short period (binge drinking). Alcohol can also be especially dangerous when combined with certain prescription medicines or \"recreational\" drugs.  SIGNS AND SYMPTOMS  Some common signs and symptoms of mild alcohol intoxication include:  · Loss of coordination.  · Changes in mood and behavior.  · Impaired judgment.  · Slurred speech.  As alcohol intoxication progresses to more severe levels, other signs and symptoms will appear. These may include:  · Vomiting.  · Confusion and impaired memory.  · Slowed breathing.  · Seizures.  · Loss of consciousness.  DIAGNOSIS   Your health care provider will take a medical history and perform a physical exam. You will be asked about the amount and type of alcohol you have consumed. Blood tests will be done to measure the concentration of alcohol in your blood. " In many places, your blood alcohol level must be lower than 80 mg/dL (0.08%) to legally drive. However, many dangerous effects of alcohol can occur at much lower levels.   TREATMENT   People with alcohol intoxication often do not require treatment. Most of the effects of alcohol intoxication are temporary, and they go away as the alcohol naturally leaves the body. Your health care provider will monitor your condition until you are stable enough to go home. Fluids are sometimes given through an IV access tube to help prevent dehydration.   HOME CARE INSTRUCTIONS  · Do not drive after drinking alcohol.  · Stay hydrated. Drink enough water and fluids to keep your urine clear or pale yellow. Avoid caffeine.    · Only take over-the-counter or prescription medicines as directed by your health care provider.    SEEK MEDICAL CARE IF:   · You have persistent vomiting.    · You do not feel better after a few days.  · You have frequent alcohol intoxication. Your health care provider can help determine if you should see a substance use treatment counselor.  SEEK IMMEDIATE MEDICAL CARE IF:   · You become shaky or tremble when you try to stop drinking.    · You shake uncontrollably (seizure).    · You throw up (vomit) blood. This may be bright red or may look like black coffee grounds.    · You have blood in your stool. This may be bright red or may appear as a black, tarry, bad smelling stool.    · You become lightheaded or faint.    MAKE SURE YOU:   · Understand these instructions.  · Will watch your condition.  · Will get help right away if you are not doing well or get worse.     This information is not intended to replace advice given to you by your health care provider. Make sure you discuss any questions you have with your health care provider.     Document Released: 09/27/2006 Document Revised: 08/20/2014 Document Reviewed: 05/23/2014  [employer] Interactive Patient Education ©2016 [employer]            Patient  Information     Patient Information    Following emergency treatment: all patient requiring follow-up care must return either to a private physician or a clinic if your condition worsens before you are able to obtain further medical attention, please return to the emergency room.     Billing Information    At [facility], we work to make the billing process streamlined for our patients.  Our Representatives are here to answer any questions you may have regarding your hospital bill.  If you have insurance coverage and have supplied your insurance information to us, we will submit a claim to your insurer on your behalf.  Should you have any questions regarding your bill, we can be reached online or by phone as follows:  Online: You are able pay your bills online or live chat with our representatives about any billing questions you may have. We are here to help Monday - Friday from 8:00am to 7:30pm and 9:00am - 12:00pm on Saturdays.  Please visit [email or web address]  for more information.   Phone:  [phone] or [phone]    Please note that your emergency physician, surgeon, pathologist, radiologist, anesthesiologist, and other specialists are not employed by [facility] and will therefore bill separately for their services.  Please contact them directly for any questions concerning their bills at the numbers below:     Emergency Physician Services:  [phone]  [facility]:  [phone]  [facility]:  [phone]  [facility]:  [phone]    1. Your final bill may vary from the amount quoted upon discharge if all procedures are not complete at that time, or if your doctor has additional procedures of which we are not aware. You will receive an additional bill if you return to the Emergency Department at [facility] for suture removal regardless of the facility of which the sutures were placed.     2. Please arrange for  settlement of this account at the emergency registration.    3. All self-pay accounts are due in full at the time of treatment.  If you are unable to meet this obligation then payment is expected within 4-5 days.     4. If you have had radiology studies (CT, X-ray, Ultrasound, MRI), you have received a preliminary result during your emergency department visit. Please contact the radiology department [phone] to receive a copy of your final result. Please discuss the Final result with your primary physician or with the follow up physician provided.     Crisis Hotline:  [place] Crisis Hotline:  6-528-XFUQLLJ or 1-457.650.5204  [place] Crisis Hotline:    1-492.402.9016 or 040-592-1161         ED Discharge Follow Up Questions    1. In order to provide you with very good care, we would like to follow up with a phone call in the next few days.  May we have your permission to contact you?     YES /  NO    2. What is the best phone number to call you? (       )_____-__________    3. What is the best time to call you?      Morning  /  Afternoon  /  Evening                   Patient Signature:  ____________________________________________________________    Date:  ____________________________________________________________

## 2017-06-27 NOTE — ED PROVIDER NOTES
"ED Provider Note    CHIEF COMPLAINT  Chief Complaint   Patient presents with   • Alcohol Intoxication       HPI  Ashleigh Marquis is a 56 y.o. female who presents for evaluation of alcohol intoxication, well-known to this department with multiple presentations for alcohol intoxication. Found outside of one of the psychiatric hospitals stumbling around by police and they brought her here for evaluation. She denies trauma, has no other complaints. Admits to drink a lot of alcohol.    REVIEW OF SYSTEMS  Negative for chest pain, back pain, abdominal pain, headache, head injury, weakness, numbness, tingling. All other systems are negative.     PAST MEDICAL HISTORY  Past Medical History   Diagnosis Date   • Hypertension    • Vitamin D deficiency    • Chronic low back pain    • Muscle disorder    • Anxiety    • OSTEOPOROSIS    • Arthritis    • GERD (gastroesophageal reflux disease)    • Ulcer (CMS-HCC)    • ASTHMA    • HTN    • Cancer (CMS-HCC) 1981     cervical   • Renal disorder      Hx of stones   • Indigestion      GERD   • Congestive heart failure (CMS-HCC)    • Psychiatric disorder    • PTSD (post-traumatic stress disorder)    • Depression    • Seizure (CMS-HCC)      several years ago r/t alcohol withdrawl   • Other specified symptom associated with female genital organs      \"I have fibroids bad\"\"   • Fall      alcohol related   • Alcoholism (CMS-HCC)    • EtOH dependence ([facility])        FAMILY HISTORY  Family History   Problem Relation Age of Onset   • Heart Disease Father    • Hypertension Father    • Diabetes Father    • Cancer Paternal Grandmother    • Depression Other    • Lung Disease Neg Hx    • Stroke Neg Hx        SOCIAL HISTORY  Social History   Substance Use Topics   • Smoking status: Current Every Day Smoker -- 0.50 packs/day for 20 years     Types: Cigarettes   • Smokeless tobacco: Never Used      Comment: 1/2 pack per day   • Alcohol Use: Yes      Comment: \"lots of vodka\"       SURGICAL " HISTORY  Past Surgical History   Procedure Laterality Date   • Hernia repair  1977   • Cysto stent placemnt pre surg  10/7/2010     Performed by ANGEL HARRIS at [facility]ERY [facility]   • Cystoscopy stent placement  11/9/2010     Performed by ANGEL AHRRIS at SURGERY [facility]   • Ureteroscopy  11/9/2010     Performed by ANGEL HARRIS at SURGERY [facility]   • Lasertripsy  11/9/2010     Performed by ANGEL HARRIS at SUR[facility]ERY [facility]   • Stent removal  11/9/2010     Performed by ANGEL HARRIS at SUR[facility]ERY [facility]       CURRENT MEDICATIONS  I personally reviewed the medication list in the charting documentation.     ALLERGIES  Allergies   Allergen Reactions   • Sulfa Drugs Vomiting   • Toradol Vomiting   • Neurontin [Gabapentin]      Bowel incontinence         MEDICAL RECORD  I have reviewed patient's medical record and pertinent results are listed above.      PHYSICAL EXAM  VITAL SIGNS: /77 mmHg  Pulse 89  Temp(Src) 36.8 °C (98.2 °F)  Resp 14  Wt 81.647 kg (180 lb)  SpO2 96%   Constitutional: Disheveled and intoxicated, no acute distress  HENT: Mucus membranes moist.  No evidence of trauma  Eyes: No scleral icterus. Normal conjunctiva   Neck: Supple, comfortable, nonpainful range of motion.   Cardiovascular: Regular heart rate and rhythm.   Thorax & Lungs: Chest is nontender.  Lungs are clear to auscultation with good air movement bilaterally.  No wheeze, rhonchi, nor rales.   Abdomen: Soft, with no tenderness, rebound nor guarding.  No mass or pulsatile mass appreciated.  Skin: Warm, dry. No rash appreciated  Extremities/Musculoskeletal: No sign of trauma. No asymmetric calf tenderness, erythema or edema. Normal range of motion   Neurologic: Sleeping but arousable, slurred speech, normal and symmetric upper and lower extremity motor and sensory function bilaterally  Psychiatric: Normal affect appropriate for the clinical situation.    DIAGNOSTIC STUDIES /  PROCEDURES    LABS  Results for orders placed or performed during the hospital encounter of 06/27/17   DIAGNOSTIC ALCOHOL   Result Value Ref Range    Diagnostic Alcohol 0.37 (H) 0.00 g/dL         COURSE & MEDICAL DECISION MAKING  I have reviewed any medical record information, laboratory studies and radiographic results as noted above.    Encounter Summary: This is a 56 y.o. female with alcohol intoxication, well-known to this department with 32 visits in the past year for the same. Noted to trauma on exam, will keep a close out her here in the emergency department until she radha which time she'll be discharged after reevaluation. ------ prior to discharge patient is reevaluated and is ambulating independently and clinically sober, discharged with strict return instructions.      DISPOSITION: Discharge home in stable condition      FINAL IMPRESSION  1. Acute alcohol intoxication, uncomplicated           This dictation was created using voice recognition software. The accuracy of the dictation is limited to the abilities of the software. I expect there may be some errors of grammar and possibly content. The nursing notes were reviewed and certain aspects of this information were incorporated into this note.    Electronically signed by: Ronal Barber, 6/27/2017 3:51 PM

## 2017-06-28 NOTE — ED NOTES
Pt given food and water. Sleeping at this time. Rise and fall of chest noted. Patient awakens when spoken too

## 2017-06-28 NOTE — DISCHARGE INSTRUCTIONS
"Alcohol Intoxication  Alcohol intoxication occurs when the amount of alcohol that a person has consumed impairs his or her ability to mentally and physically function. Alcohol directly impairs the normal chemical activity of the brain. Drinking large amounts of alcohol can lead to changes in mental function and behavior, and it can cause many physical effects that can be harmful.   Alcohol intoxication can range in severity from mild to very severe. Various factors can affect the level of intoxication that occurs, such as the person's age, gender, weight, frequency of alcohol consumption, and the presence of other medical conditions (such as diabetes, seizures, or heart conditions). Dangerous levels of alcohol intoxication may occur when people drink large amounts of alcohol in a short period (binge drinking). Alcohol can also be especially dangerous when combined with certain prescription medicines or \"recreational\" drugs.  SIGNS AND SYMPTOMS  Some common signs and symptoms of mild alcohol intoxication include:  · Loss of coordination.  · Changes in mood and behavior.  · Impaired judgment.  · Slurred speech.  As alcohol intoxication progresses to more severe levels, other signs and symptoms will appear. These may include:  · Vomiting.  · Confusion and impaired memory.  · Slowed breathing.  · Seizures.  · Loss of consciousness.  DIAGNOSIS   Your health care provider will take a medical history and perform a physical exam. You will be asked about the amount and type of alcohol you have consumed. Blood tests will be done to measure the concentration of alcohol in your blood. In many places, your blood alcohol level must be lower than 80 mg/dL (0.08%) to legally drive. However, many dangerous effects of alcohol can occur at much lower levels.   TREATMENT   People with alcohol intoxication often do not require treatment. Most of the effects of alcohol intoxication are temporary, and they go away as the alcohol naturally " leaves the body. Your health care provider will monitor your condition until you are stable enough to go home. Fluids are sometimes given through an IV access tube to help prevent dehydration.   HOME CARE INSTRUCTIONS  · Do not drive after drinking alcohol.  · Stay hydrated. Drink enough water and fluids to keep your urine clear or pale yellow. Avoid caffeine.    · Only take over-the-counter or prescription medicines as directed by your health care provider.    SEEK MEDICAL CARE IF:   · You have persistent vomiting.    · You do not feel better after a few days.  · You have frequent alcohol intoxication. Your health care provider can help determine if you should see a substance use treatment counselor.  SEEK IMMEDIATE MEDICAL CARE IF:   · You become shaky or tremble when you try to stop drinking.    · You shake uncontrollably (seizure).    · You throw up (vomit) blood. This may be bright red or may look like black coffee grounds.    · You have blood in your stool. This may be bright red or may appear as a black, tarry, bad smelling stool.    · You become lightheaded or faint.    MAKE SURE YOU:   · Understand these instructions.  · Will watch your condition.  · Will get help right away if you are not doing well or get worse.     This information is not intended to replace advice given to you by your health care provider. Make sure you discuss any questions you have with your health care provider.     Document Released: 09/27/2006 Document Revised: 08/20/2014 Document Reviewed: 05/23/2014  [employer] Interactive Patient Education ©2016 [employer]

## 2017-06-30 ENCOUNTER — HOSPITAL ENCOUNTER (EMERGENCY)
Dept: HOSPITAL 8 - ED | Age: 57
Discharge: HOME | End: 2017-06-30
Payer: MEDICAID

## 2017-06-30 VITALS — DIASTOLIC BLOOD PRESSURE: 70 MMHG | SYSTOLIC BLOOD PRESSURE: 130 MMHG

## 2017-06-30 VITALS — HEIGHT: 65 IN | WEIGHT: 187.39 LBS | BODY MASS INDEX: 31.22 KG/M2

## 2017-06-30 DIAGNOSIS — K21.9: ICD-10-CM

## 2017-06-30 DIAGNOSIS — F10.120: Primary | ICD-10-CM

## 2017-06-30 DIAGNOSIS — I10: ICD-10-CM

## 2017-06-30 PROCEDURE — 99283 EMERGENCY DEPT VISIT LOW MDM: CPT

## 2017-11-21 ENCOUNTER — HOSPITAL ENCOUNTER (EMERGENCY)
Dept: HOSPITAL 8 - ED | Age: 57
Discharge: LEFT BEFORE DISCHARGE | End: 2017-11-21
Payer: MEDICAID

## 2017-11-21 VITALS — BODY MASS INDEX: 30.12 KG/M2 | HEIGHT: 65 IN | WEIGHT: 180.78 LBS

## 2017-11-21 VITALS — SYSTOLIC BLOOD PRESSURE: 130 MMHG | DIASTOLIC BLOOD PRESSURE: 87 MMHG

## 2017-11-21 DIAGNOSIS — F43.10: ICD-10-CM
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
## 2017-11-21 DIAGNOSIS — F10.220: ICD-10-CM
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
## 2017-11-21 DIAGNOSIS — S09.90XA: Primary | ICD-10-CM
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
## 2017-11-21 DIAGNOSIS — K21.9: ICD-10-CM
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
## 2017-11-21 PROCEDURE — 99283 EMERGENCY DEPT VISIT LOW MDM: CPT

## 2017-11-24 ENCOUNTER — HOSPITAL ENCOUNTER (EMERGENCY)
Dept: HOSPITAL 8 - ED | Age: 57
Discharge: HOME | End: 2017-11-24
Payer: MEDICAID

## 2017-11-24 VITALS — HEIGHT: 65 IN | BODY MASS INDEX: 30.12 KG/M2 | WEIGHT: 180.78 LBS

## 2017-11-24 VITALS — SYSTOLIC BLOOD PRESSURE: 110 MMHG | DIASTOLIC BLOOD PRESSURE: 66 MMHG

## 2017-11-24 DIAGNOSIS — F43.10: ICD-10-CM

## 2017-11-24 DIAGNOSIS — K21.9: ICD-10-CM

## 2017-11-24 DIAGNOSIS — I10: ICD-10-CM

## 2017-11-24 DIAGNOSIS — E87.0: ICD-10-CM

## 2017-11-24 DIAGNOSIS — F10.229: Primary | ICD-10-CM

## 2017-11-24 PROCEDURE — 99283 EMERGENCY DEPT VISIT LOW MDM: CPT

## 2017-11-25 ENCOUNTER — HOSPITAL ENCOUNTER (EMERGENCY)
Dept: HOSPITAL 8 - ED | Age: 57
Discharge: HOME | End: 2017-11-25
Payer: MEDICAID

## 2017-11-25 VITALS — WEIGHT: 180.78 LBS | HEIGHT: 64 IN | BODY MASS INDEX: 30.86 KG/M2

## 2017-11-25 VITALS — SYSTOLIC BLOOD PRESSURE: 121 MMHG | DIASTOLIC BLOOD PRESSURE: 71 MMHG
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
## 2017-11-25 DIAGNOSIS — S09.90XA: ICD-10-CM
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
## 2017-11-25 DIAGNOSIS — Z59.0: ICD-10-CM

## 2017-11-25 DIAGNOSIS — F10.220: Primary | ICD-10-CM
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
## 2017-11-25 PROCEDURE — 70450 CT HEAD/BRAIN W/O DYE: CPT

## 2017-11-25 SDOH — ECONOMIC STABILITY - HOUSING INSECURITY: HOMELESSNESS: Z59.0

## 2017-11-26 ENCOUNTER — HOSPITAL ENCOUNTER (EMERGENCY)
Facility: MEDICAL CENTER | Age: 57
End: 2017-11-26
Attending: EMERGENCY MEDICINE
Payer: MEDICAID

## 2017-11-26 ENCOUNTER — APPOINTMENT (OUTPATIENT)
Dept: RADIOLOGY | Facility: MEDICAL CENTER | Age: 57
End: 2017-11-26
Attending: EMERGENCY MEDICINE
Payer: MEDICAID

## 2017-11-26 VITALS
HEART RATE: 85 BPM | SYSTOLIC BLOOD PRESSURE: 108 MMHG | WEIGHT: 180 LBS | RESPIRATION RATE: 13 BRPM | TEMPERATURE: 97.1 F | OXYGEN SATURATION: 98 % | BODY MASS INDEX: 29.99 KG/M2 | HEIGHT: 65 IN | DIASTOLIC BLOOD PRESSURE: 80 MMHG

## 2017-11-26 VITALS
DIASTOLIC BLOOD PRESSURE: 65 MMHG | RESPIRATION RATE: 18 BRPM | WEIGHT: 180 LBS | TEMPERATURE: 97.9 F | HEIGHT: 65 IN | OXYGEN SATURATION: 95 % | HEART RATE: 85 BPM | BODY MASS INDEX: 29.99 KG/M2 | SYSTOLIC BLOOD PRESSURE: 115 MMHG

## 2017-11-26 DIAGNOSIS — F10.920 ALCOHOLIC INTOXICATION WITHOUT COMPLICATION (HCC): ICD-10-CM

## 2017-11-26 LAB — POC BREATHALIZER: 0.29 PERCENT (ref 0–0.01)

## 2017-11-26 PROCEDURE — 70450 CT HEAD/BRAIN W/O DYE: CPT

## 2017-11-26 PROCEDURE — 99284 EMERGENCY DEPT VISIT MOD MDM: CPT

## 2017-11-26 PROCEDURE — A9270 NON-COVERED ITEM OR SERVICE: HCPCS | Performed by: EMERGENCY MEDICINE

## 2017-11-26 PROCEDURE — 99285 EMERGENCY DEPT VISIT HI MDM: CPT

## 2017-11-26 PROCEDURE — 302970 POC BREATHALIZER: Performed by: EMERGENCY MEDICINE

## 2017-11-26 PROCEDURE — 700102 HCHG RX REV CODE 250 W/ 637 OVERRIDE(OP): Performed by: EMERGENCY MEDICINE

## 2017-11-26 PROCEDURE — 302970 POC BREATHALIZER

## 2017-11-26 PROCEDURE — 304561 HCHG STAT O2

## 2017-11-26 PROCEDURE — 99285 EMERGENCY DEPT VISIT HI MDM: CPT | Mod: 25

## 2017-11-26 RX ORDER — ACETAMINOPHEN 325 MG/1
650 TABLET ORAL ONCE
Status: COMPLETED | OUTPATIENT
Start: 2017-11-26 | End: 2017-11-26

## 2017-11-26 RX ADMIN — ACETAMINOPHEN 650 MG: 325 TABLET, FILM COATED ORAL at 17:27

## 2017-11-26 ASSESSMENT — LIFESTYLE VARIABLES
PAROXYSMAL SWEATS: NO SWEAT VISIBLE
DO YOU DRINK ALCOHOL: YES
CONSUMPTION TOTAL: INCOMPLETE
TOTAL SCORE: 4
VISUAL DISTURBANCES: NOT PRESENT
EVER HAD A DRINK FIRST THING IN THE MORNING TO STEADY YOUR NERVES TO GET RID OF A HANGOVER: YES
TOTAL SCORE: 4
EVER FELT BAD OR GUILTY ABOUT YOUR DRINKING: YES
DOES PATIENT WANT TO STOP DRINKING: YES
HAVE PEOPLE ANNOYED YOU BY CRITICIZING YOUR DRINKING: YES
TOTAL SCORE: 4
ORIENTATION AND CLOUDING OF SENSORIUM: CANNOT DO SERIAL ADDITIONS OR IS UNCERTAIN ABOUT DATE
TOTAL SCORE: 6
ANXIETY: NO ANXIETY (AT EASE)
NAUSEA AND VOMITING: NO NAUSEA AND NO VOMITING
DOES PATIENT WANT TO TALK TO SOMEONE ABOUT QUITTING: YES
HAVE YOU EVER FELT YOU SHOULD CUT DOWN ON YOUR DRINKING: YES
AGITATION: SOMEWHAT MORE THAN NORMAL ACTIVITY
AUDITORY DISTURBANCES: NOT PRESENT
HEADACHE, FULLNESS IN HEAD: MODERATELY SEVERE
DOES PATIENT WANT TO STOP DRINKING: CANNOT ASSESS
TREMOR: NO TREMOR

## 2017-11-26 ASSESSMENT — PAIN SCALES - GENERAL
PAINLEVEL_OUTOF10: 8
PAINLEVEL_OUTOF10: 7

## 2017-11-26 NOTE — ED NOTES
18th ER visit this year, many referrals to [facility], no further assistance with meds, transportation or referrals will be provided, may self present to [facility] is desires.

## 2017-11-26 NOTE — ED PROVIDER NOTES
"ED Provider Note    Scribed for Gilberto Rosenthal M.D. by Pavel Lyles. 11/26/2017, 2:48 AM.    Primary care provider: Pcp Pt States None  Means of arrival: ambulance  History obtained from: Patient  History limited by: patient's level of intoxication    CHIEF COMPLAINT  Chief Complaint   Patient presents with   • Alcohol Intoxication     found sleeping on sidewalk by PD.    • Head Ache     x 3-4 hrs       HPI  Ashleigh Marquis is a 57 y.o. female who presents to the Emergency Department for evaluation of alcohol intoxication. Per nursing notes, the patient was found sleeping on the sidewalk. The patient admits to drinking 1 pint of vodka per day on average.  The patient reports she lost her Clonazepam prescription. Patient states \"I need some medication for detox\". She adds she fell and hit her head, but is unable to specify when she fell. Patient endorses associated left-sided head pain.     Further history is limited secondary to the patient's level of intoxication.    REVIEW OF SYSTEMS  See HPI for further details.     Further history is limited secondary to the patient's level of intoxication.    C.      PAST MEDICAL HISTORY   has a past medical history of Alcoholism (CMS-HCC); Anxiety; Arthritis; ASTHMA; Cancer (CMS-[facility]) (1981); Chronic low back pain; Congestive heart failure (CMS-HCC); Depression; EtOH dependence (CMS-HCC); Fall; GERD (gastroesophageal reflux disease); HTN; Hypertension; Indigestion; Muscle disorder; OSTEOPOROSIS; Other specified symptom associated with female genital organs; Psychiatric disorder; PTSD (post-traumatic stress disorder); Renal disorder; Seizure (CMS-HCC); Ulcer (CMS-HCC); and Vitamin D deficiency.    SURGICAL HISTORY   has a past surgical history that includes hernia repair (1977); cysto stent placemnt pre surg (10/7/2010); cystoscopy stent placement (11/9/2010); ureteroscopy (11/9/2010); lasertripsy (11/9/2010); and stent removal (11/9/2010).    SOCIAL HISTORY  Social " "History   Substance Use Topics   • Smoking status: Current Every Day Smoker     Packs/day: 0.50     Years: 20.00     Types: Cigarettes   • Smokeless tobacco: Never Used      Comment: 1/2 pack per day   • Alcohol use Yes      Comment: \"lots of vodka\" currently intoxicated      History   Drug Use No       FAMILY HISTORY  Family History   Problem Relation Age of Onset   • Heart Disease Father    • Hypertension Father    • Diabetes Father    • Cancer Paternal Grandmother    • Depression Other    • Lung Disease Neg Hx    • Stroke Neg Hx        CURRENT MEDICATIONS  Reviewed.  See Encounter Summary.     ALLERGIES  Allergies   Allergen Reactions   • Sulfa Drugs Vomiting   • Toradol Vomiting   • Neurontin [Gabapentin]      Bowel incontinence         PHYSICAL EXAM  VITAL SIGNS: /82   Pulse 73   Temp 36.6 °C (97.9 °F)   Resp 16   Ht 1.651 m (5' 5\")   Wt 81.6 kg (180 lb)   BMI 29.95 kg/m²   Constitutional: Awake, slurring speech, appears intoxicated.   HENT: Normocephalic, Bilateral external ears normal. Nose normal. Scalp hematoma superior to the left temple.   Eyes: Pupils are equal and reactive. Conjunctiva normal, non-icteric.   Heart: Regular rate and rhythm  Lungs: No acute respiratory distress, No increased work of breathing, No accessory muscle use.  Abdomen: Soft, nontender, non-distended.   Skin: Warm, Dry, No erythema, No rash.   Extremities: No edema, no cyanosis.   Neurologic: Awake, slurred speech. Appears intoxicated.   Psychiatric: Unable to evaluate.     DIAGNOSTIC STUDIES / PROCEDURES     LABS  Results for orders placed or performed during the hospital encounter of 11/26/17   POC BREATHALIZER   Result Value Ref Range    POC Breathalizer 0.289 (A) 0.00 - 0.01 Percent      All labs were reviewed by me.    RADIOLOGY  CT-HEAD W/O   Final Result      Negative noncontrast CT scan of the head / brain.        The radiologist's interpretation of all radiological studies have been reviewed by me.    COURSE & " MEDICAL DECISION MAKING  Nursing notes, VS, PMSFHx reviewed in chart.    2:48 AM - Patient seen and examined at bedside. Ordered head CT to evaluate her symptoms.     3:22 AM - Patient ambulating with steady gait. She will be discharged with a referral to [facility].     Decision Making:  This is a 57 y.o. year old female who presents with alcohol intoxication. The patient here also reports having fallen a couple of times over the past couple of days and having a significant headache here. Due to this, CT scan of the head was obtained though no evidence of skull fracture or intracranial hemorrhage was noted. The patient was ambulatory with a steady gait, and will subsequently be discharged with instructions to follow with [facility] for detoxification should that be with the patient is interested in. She is instructed to return here for the development of any new or worsening symptoms.    The patient will return for new or worsening symptoms and is stable at the time of discharge.    The patient is referred to a primary physician for blood pressure management, diabetic screening, and for all other preventative health concerns.    DISPOSITION:  Patient will be discharged home in stable condition.    FOLLOW UP:  [place] [id number]  [phone]  Schedule an appointment as soon as possible for a visit          FINAL IMPRESSION  1. Alcoholic intoxication without complication (CMS-HCC)          Pavel GAN (Scribe), am scribing for, and in the presence of, Gilberto Rosenthal M.D..    Electronically signed by: Pavel Lyles (Fernand[place]), 11/26/2017    Gilberto GAN M.D. personally performed the services described in this documentation, as scribed by Pavel Lyles in my presence, and it is both accurate and complete.    The note accurately reflects work and decisions made by me.  Gilberto Rosenthal  11/26/2017  5:13 AM

## 2017-11-26 NOTE — ED NOTES
.  Chief Complaint   Patient presents with   • Alcohol Intoxication     BIB [facility] from street, discharged from [facility]. Drank 2 pts vodka, pt placed in w/c to bring into triage.  retuned to lobby per w/c.

## 2017-11-26 NOTE — ED NOTES
Discharge instructions given. All questions answered.  Pt to follow-up with PCP. Pt verbalized understanding.

## 2017-11-26 NOTE — DISCHARGE PLANNING
Received request to facilitate ride for patient to be discharged. Called [employer] and was informed that patient's phone number, address, and Medicaid ID number do not match. Not able to verify [employer] benefits. Spoke with patient who provided new phone number [phone]. Patient also informed me that she does not have a physical address and that she has been staying in motels. Unable to tell me which motel she is currently staying at. Informed patient that she is being discharged as the physician has medically cleared her and that she will be discharged to the homeless shelter as she has no place of residence. KOLTON Posada providing cab voucher to RN for patient to transported to [facility].

## 2017-11-26 NOTE — DISCHARGE INSTRUCTIONS
"Alcohol Intoxication  Alcohol intoxication occurs when the amount of alcohol that a person has consumed impairs his or her ability to mentally and physically function. Alcohol directly impairs the normal chemical activity of the brain. Drinking large amounts of alcohol can lead to changes in mental function and behavior, and it can cause many physical effects that can be harmful.   Alcohol intoxication can range in severity from mild to very severe. Various factors can affect the level of intoxication that occurs, such as the person's age, gender, weight, frequency of alcohol consumption, and the presence of other medical conditions (such as diabetes, seizures, or heart conditions). Dangerous levels of alcohol intoxication may occur when people drink large amounts of alcohol in a short period (binge drinking). Alcohol can also be especially dangerous when combined with certain prescription medicines or \"recreational\" drugs.  SIGNS AND SYMPTOMS  Some common signs and symptoms of mild alcohol intoxication include:  · Loss of coordination.  · Changes in mood and behavior.  · Impaired judgment.  · Slurred speech.  As alcohol intoxication progresses to more severe levels, other signs and symptoms will appear. These may include:  · Vomiting.  · Confusion and impaired memory.  · Slowed breathing.  · Seizures.  · Loss of consciousness.  DIAGNOSIS   Your health care provider will take a medical history and perform a physical exam. You will be asked about the amount and type of alcohol you have consumed. Blood tests will be done to measure the concentration of alcohol in your blood. In many places, your blood alcohol level must be lower than 80 mg/dL (0.08%) to legally drive. However, many dangerous effects of alcohol can occur at much lower levels.   TREATMENT   People with alcohol intoxication often do not require treatment. Most of the effects of alcohol intoxication are temporary, and they go away as the alcohol naturally " leaves the body. Your health care provider will monitor your condition until you are stable enough to go home. Fluids are sometimes given through an IV access tube to help prevent dehydration.   HOME CARE INSTRUCTIONS  · Do not drive after drinking alcohol.  · Stay hydrated. Drink enough water and fluids to keep your urine clear or pale yellow. Avoid caffeine.    · Only take over-the-counter or prescription medicines as directed by your health care provider.    SEEK MEDICAL CARE IF:   · You have persistent vomiting.    · You do not feel better after a few days.  · You have frequent alcohol intoxication. Your health care provider can help determine if you should see a substance use treatment counselor.  SEEK IMMEDIATE MEDICAL CARE IF:   · You become shaky or tremble when you try to stop drinking.    · You shake uncontrollably (seizure).    · You throw up (vomit) blood. This may be bright red or may look like black coffee grounds.    · You have blood in your stool. This may be bright red or may appear as a black, tarry, bad smelling stool.    · You become lightheaded or faint.    MAKE SURE YOU:   · Understand these instructions.  · Will watch your condition.  · Will get help right away if you are not doing well or get worse.     This information is not intended to replace advice given to you by your health care provider. Make sure you discuss any questions you have with your health care provider.     Document Released: 09/27/2006 Document Revised: 08/20/2014 Document Reviewed: 05/23/2014  [employer] Interactive Patient Education ©2016 [employer]

## 2017-11-26 NOTE — ED NOTES
Pt to be dc home, pt refuse to give address, pt states shes not ready to leave. Contact social work. Info on face sheet not accurate according to social work

## 2017-11-26 NOTE — ED NOTES
Pt found by PD sleeping on sidewalk, EMS called after pt c/o [place]. Pt told EMS she wants to detox. Pt c/o ha x 3-4 hours left sided ache

## 2017-11-27 ENCOUNTER — PATIENT OUTREACH (OUTPATIENT)
Dept: HEALTH INFORMATION MANAGEMENT | Facility: OTHER | Age: 57
End: 2017-11-27

## 2017-11-27 ENCOUNTER — APPOINTMENT (OUTPATIENT)
Dept: RADIOLOGY | Facility: MEDICAL CENTER | Age: 57
End: 2017-11-27
Attending: EMERGENCY MEDICINE
Payer: MEDICAID

## 2017-11-27 ENCOUNTER — HOSPITAL ENCOUNTER (EMERGENCY)
Facility: MEDICAL CENTER | Age: 57
End: 2017-11-27
Attending: EMERGENCY MEDICINE
Payer: MEDICAID

## 2017-11-27 VITALS
HEART RATE: 74 BPM | BODY MASS INDEX: 28.25 KG/M2 | OXYGEN SATURATION: 92 % | WEIGHT: 180 LBS | HEIGHT: 67 IN | RESPIRATION RATE: 16 BRPM

## 2017-11-27 VITALS
HEIGHT: 67 IN | OXYGEN SATURATION: 99 % | TEMPERATURE: 98.6 F | SYSTOLIC BLOOD PRESSURE: 134 MMHG | DIASTOLIC BLOOD PRESSURE: 78 MMHG | HEART RATE: 78 BPM | WEIGHT: 180 LBS | BODY MASS INDEX: 28.25 KG/M2 | RESPIRATION RATE: 18 BRPM

## 2017-11-27 DIAGNOSIS — R07.89 CHEST WALL PAIN: ICD-10-CM

## 2017-11-27 DIAGNOSIS — F10.920 ALCOHOLIC INTOXICATION WITHOUT COMPLICATION (HCC): ICD-10-CM

## 2017-11-27 DIAGNOSIS — F10.10 ALCOHOL ABUSE: ICD-10-CM

## 2017-11-27 DIAGNOSIS — F10.920 ACUTE ALCOHOLIC INTOXICATION WITHOUT COMPLICATION (HCC): ICD-10-CM

## 2017-11-27 LAB
GLUCOSE BLD-MCNC: 137 MG/DL (ref 65–99)
POC BREATHALIZER: 0.24 PERCENT (ref 0–0.01)

## 2017-11-27 PROCEDURE — 99284 EMERGENCY DEPT VISIT MOD MDM: CPT

## 2017-11-27 PROCEDURE — 302970 POC BREATHALIZER: Performed by: EMERGENCY MEDICINE

## 2017-11-27 PROCEDURE — 71010 DX-CHEST-LIMITED (1 VIEW): CPT

## 2017-11-27 PROCEDURE — 82962 GLUCOSE BLOOD TEST: CPT

## 2017-11-27 ASSESSMENT — PAIN SCALES - GENERAL
PAINLEVEL_OUTOF10: 0
PAINLEVEL_OUTOF10: 0

## 2017-11-27 ASSESSMENT — LIFESTYLE VARIABLES
TOTAL SCORE: 4
DOES PATIENT WANT TO TALK TO SOMEONE ABOUT QUITTING: YES
EVER FELT BAD OR GUILTY ABOUT YOUR DRINKING: YES
DOES PATIENT WANT TO STOP DRINKING: YES
CONSUMPTION TOTAL: INCOMPLETE
HAVE YOU EVER FELT YOU SHOULD CUT DOWN ON YOUR DRINKING: YES
EVER HAD A DRINK FIRST THING IN THE MORNING TO STEADY YOUR NERVES TO GET RID OF A HANGOVER: YES
TOTAL SCORE: 4
DO YOU DRINK ALCOHOL: YES
HAVE PEOPLE ANNOYED YOU BY CRITICIZING YOUR DRINKING: YES
TOTAL SCORE: 4

## 2017-11-27 ASSESSMENT — PAIN SCALES - WONG BAKER
WONGBAKER_NUMERICALRESPONSE: DOESN'T HURT AT ALL
WONGBAKER_NUMERICALRESPONSE: DOESN'T HURT AT ALL

## 2017-11-27 NOTE — ED NOTES
"Pt placed on monitor in room. Agree with triage nurse's assessment. Pt states 8/10 pain \"all over.\"  "

## 2017-11-27 NOTE — ED NOTES
"All discharge instructions given to patient. Patient verbalized understanding and has no further questions.  Patient gathered all belongings and ambulated with slow but steady gait to ER lobby with nurse at side. Nurse to call pt a cab to go \"downtown.\" See vital signs for discharge vitals. Patient educated on dangers of operating a vehicle after administration of narcotics and/or sedatives. Pt educated to seek medical attention if s/sx worsen or do not improve.   "

## 2017-11-27 NOTE — DISCHARGE INSTRUCTIONS
"Please follow up with a primary physician for blood pressure management, diabetic screening, and all other preventive health concerns.      Alcohol Intoxication  Alcohol intoxication occurs when the amount of alcohol that a person has consumed impairs his or her ability to mentally and physically function. Alcohol directly impairs the normal chemical activity of the brain. Drinking large amounts of alcohol can lead to changes in mental function and behavior, and it can cause many physical effects that can be harmful.   Alcohol intoxication can range in severity from mild to very severe. Various factors can affect the level of intoxication that occurs, such as the person's age, gender, weight, frequency of alcohol consumption, and the presence of other medical conditions (such as diabetes, seizures, or heart conditions). Dangerous levels of alcohol intoxication may occur when people drink large amounts of alcohol in a short period (binge drinking). Alcohol can also be especially dangerous when combined with certain prescription medicines or \"recreational\" drugs.  SIGNS AND SYMPTOMS  Some common signs and symptoms of mild alcohol intoxication include:  · Loss of coordination.  · Changes in mood and behavior.  · Impaired judgment.  · Slurred speech.  As alcohol intoxication progresses to more severe levels, other signs and symptoms will appear. These may include:  · Vomiting.  · Confusion and impaired memory.  · Slowed breathing.  · Seizures.  · Loss of consciousness.  DIAGNOSIS   Your health care provider will take a medical history and perform a physical exam. You will be asked about the amount and type of alcohol you have consumed. Blood tests will be done to measure the concentration of alcohol in your blood. In many places, your blood alcohol level must be lower than 80 mg/dL (0.08%) to legally drive. However, many dangerous effects of alcohol can occur at much lower levels.   TREATMENT   People with alcohol " intoxication often do not require treatment. Most of the effects of alcohol intoxication are temporary, and they go away as the alcohol naturally leaves the body. Your health care provider will monitor your condition until you are stable enough to go home. Fluids are sometimes given through an IV access tube to help prevent dehydration.   HOME CARE INSTRUCTIONS  · Do not drive after drinking alcohol.  · Stay hydrated. Drink enough water and fluids to keep your urine clear or pale yellow. Avoid caffeine.    · Only take over-the-counter or prescription medicines as directed by your health care provider.    SEEK MEDICAL CARE IF:   · You have persistent vomiting.    · You do not feel better after a few days.  · You have frequent alcohol intoxication. Your health care provider can help determine if you should see a substance use treatment counselor.  SEEK IMMEDIATE MEDICAL CARE IF:   · You become shaky or tremble when you try to stop drinking.    · You shake uncontrollably (seizure).    · You throw up (vomit) blood. This may be bright red or may look like black coffee grounds.    · You have blood in your stool. This may be bright red or may appear as a black, tarry, bad smelling stool.    · You become lightheaded or faint.    MAKE SURE YOU:   · Understand these instructions.  · Will watch your condition.  · Will get help right away if you are not doing well or get worse.     This information is not intended to replace advice given to you by your health care provider. Make sure you discuss any questions you have with your health care provider.     Document Released: 09/27/2006 Document Revised: 08/20/2014 Document Reviewed: 05/23/2014  "[employer] Interactive Patient Education ©2016 "[employer]

## 2017-11-27 NOTE — ED NOTES
Pt ambulated to restroom, she said she is going to put her make up on before she gets d/c, pt took her bag in restroom. erp aware.

## 2017-11-27 NOTE — ED NOTES
PTS B/S , ER MD WAS TOLD, NO NEW ORDERS RECEIVED.PT IS AAOX4, PT IS IN NAD. AIRWAY IS CLEAR, NO SOB. NO CHEST PAIN.

## 2017-11-27 NOTE — ED NOTES
Chief Complaint   Patient presents with   • Alcohol Intoxication     Pt bib ems from [facility], pt was discharged here earlier this morning. Per [facility] , pt too intoxicated  poc breathlizer 0.231.

## 2017-11-27 NOTE — ED PROVIDER NOTES
"ED Provider Note      ER PROVIDER NOTE    Scribed for Matt Joyce M.D.  by Matt Joyce. 11/27/2017 at 12:59 AM.    Primary Care Provider: Pcp Pt States None  Means of Arrival: ems  History obtained from: pt/ems   History limited by: etoh     CHIEF COMPLAINT  Chief Complaint   Patient presents with   • ETOH Withdrawal       HPI  Ashleigh Marquis is a 57 y.o. female who presents to the emergency department complaining ofAlcohol intoxication. Patient reports she \"drank herself into oblivion\". Was drinking vodka. She was found sleeping outside and EMS was called. Patient has no complaints at this time. She denies any nausea vomiting headaches. Denies trauma, no recent fevers or chills. No SI        REVIEW OF SYSTEMS  Pertinent positives include alcohol intoxication. Pertinent negatives include no headache. See HPI for details. All other systems reviewed and are negative.    PAST MEDICAL HISTORY   has a past medical history of Alcoholism (CMS-HCC); Anxiety; Arthritis; ASTHMA; Cancer (CMS-HCC) (1981); Chronic low back pain; Congestive heart failure (CMS-HCC); Depression; EtOH dependence (CMS-HCC); Fall; GERD (gastroesophageal reflux disease); HTN; Hypertension; Indigestion; Muscle disorder; OSTEOPOROSIS; Other specified symptom associated with female genital organs; Psychiatric disorder; PTSD (post-traumatic stress disorder); Renal disorder; Seizure (CMS-HCC); Ulcer (CMS-HCC); and Vitamin D deficiency.    SURGICAL HISTORY   has a past surgical history that includes hernia repair (1977); cysto stent placemnt pre surg (10/7/2010); cystoscopy stent placement (11/9/2010); ureteroscopy (11/9/2010); lasertripsy (11/9/2010); and stent removal (11/9/2010).    FAMILY HISTORY  Family History   Problem Relation Age of Onset   • Heart Disease Father    • Hypertension Father    • Diabetes Father    • Cancer Paternal Grandmother    • Depression Other    • Lung Disease Neg Hx    • Stroke Neg Hx        SOCIAL HISTORY  Social " "History     Social History   • Marital status:      Spouse name: N/A   • Number of children: N/A   • Years of education: N/A     Social History Main Topics   • Smoking status: Current Every Day Smoker     Packs/day: 0.50     Years: 20.00     Types: Cigarettes   • Smokeless tobacco: Never Used      Comment: 1/2 pack per day   • Alcohol use Yes      Comment: \"lots of vodka\" currently intoxicated   • Drug use: No   • Sexual activity: No     Other Topics Concern   • Not on file     Social History Narrative    ** Merged History Encounter **           History   Drug Use No       CURRENT MEDICATIONS  Home Medications     Reviewed by Roopa Hurtado R.N. (Registered Nurse) on 11/27/17 at 0051  Med List Status: Not Addressed   Medication Last Dose Status   clonazepam (KLONOPIN) 1 MG Tab unk Active   fluoxetine (PROZAC) 10 MG Cap unk Active   hydrocodone-acetaminophen (NORCO) 5-325 MG Tab per tablet unk Active   lisinopril (PRINIVIL) 10 MG Tab unk Active   lorazepam (ATIVAN) 1 MG Tab unk Active   Magnesium 400 MG Cap unk Active   omeprazole (PRILOSEC) 20 MG delayed-release capsule  Active   spironolactone (ALDACTONE) 25 MG Tab unk Active                ALLERGIES  Allergies   Allergen Reactions   • Sulfa Drugs Vomiting   • Toradol Vomiting   • Neurontin [Gabapentin]      Bowel incontinence         PHYSICAL EXAM  VITAL SIGNS: /85   Pulse 98   Temp 37 °C (98.6 °F)   Resp 20   Ht 1.702 m (5' 7\")   Wt 81.6 kg (180 lb)   SpO2 99%   BMI 28.19 kg/m²   Pulse ox interpretation: I interpret this pulse ox as normal.    Constitutional: Sleepy but easily arousable to verbal stimuli, smells of alcohol.  HENT: No signs of trauma, Bilateral external ears normal, Nose normal.   Eyes: Pupils are equal and reactive, Conjunctiva normal, Non-icteric.   Neck: Normal range of motion, No tenderness, Supple, No stridor.   Lymphatic: No lymphadenopathy noted.   Cardiovascular: Regular rate and rhythm, no murmurs.   Thorax & Lungs: " Normal breath sounds, No respiratory distress, No wheezing, No chest tenderness.   Abdomen: Bowel sounds normal, Soft, No tenderness, No masses, No pulsatile masses. No peritoneal signs.  Skin: Warm, Dry, No erythema, No rash.   Back: No bony tenderness, No CVA tenderness.   Extremities: Intact distal pulses, No edema, No tenderness, No cyanosis,  Musculoskeletal: Good range of motion in all major joints. No tenderness to palpation or major deformities noted.   Neurologic: Sleepy but easily arousable to verbal stimuli, bilateral horizontal nystagmus, slurred speech, moves all 4 extremities No focal deficits noted.   Psychiatric: Affect normal, Judgment normal, Mood normal.     DIAGNOSTIC STUDIES / PROCEDURES        LABS  Results for orders placed or performed during the hospital encounter of 17   POC BREATHALIZER   Result Value Ref Range    POC Breathalizer 0.240 (A) 0.00 - 0.01 Percent   ACCU-CHEK GLUCOSE   Result Value Ref Range    Glucose - Accu-Ck 137 (H) 65 - 99 mg/dL       All labs reviewed by me.    RADIOLOGY  No orders to display     The radiologist's interpretation of all radiological studies have been reviewed by me.    COURSE & MEDICAL DECISION MAKING  Nursing notes, VS, PMSFHx reviewed in chart.    12:59 AM Patient seen and examined at bedside.Will check blood sugar, continue to assess for sobriety    3:35 AM  Patient reevaluated, she is much more alert over still unsteady on her feet will continue to monitor for sobriety    4:35 AM  Patient awake, alert, ambulates to bathroom with steady gait    Decision Makin y.o. y.o. female seen in ED for alcohol intoxication.  The patient was clinically intoxicated on hx and PE and had a etoh level of 240  There is no evidence of head trauma without any evidence of laceration, contusions, hematomas or fractures. Doubt significant intracranial bleed.  .   There is no evidence of SBI with stable vital signs and no pain of focal infx complaints.  The pt  has a supple neck and no headache, therefore low suspicion for meningitis, encephalitis for the cause of this pts AMS.   Although serum chemistries were not drawn, the normal progression of sobriety and the pts clear sensorium at this time make a significant electrolyte abnormality or metabolic disturbance or dangerous toxic ingestion as a cause for the pts AMS unlikely.   The pt sobered appropriately within the department and had a normal neurological examination. The pt is alert and oriented times 3 and has a normal and steady gate.   The pt was discharged from the department with stable vital signs after being counseled on alcohol sesation.     The patient will return for new or worsening symptoms and is stable at the time of discharge.    The patient is referred to a primary physician for blood pressure management, diabetic screening, and for all other preventative health concerns.    DISPOSITION:  Patient will be discharged home in stable condition.    FOLLOW UP:  Your Physician  Varies    In 1 week        OUTPATIENT MEDICATIONS:  New Prescriptions    No medications on file       .    FINAL IMPRESSION  1. Alcoholic intoxication without complication (CMS-[facility])         The note accurately reflects work and decisions made by me.  Matt Joyce  11/27/2017  3:39 AM

## 2017-11-27 NOTE — ED PROVIDER NOTES
ED Provider Note    Scribed for Sayda Rutledge M.D. by Alisson Murillo. 11/26/2017, 5:20 PM.    Primary care provider: None  Means of arrival: Ambulance  History obtained from: Patient  History limited by: None    CHIEF COMPLAINT  Chief Complaint   Patient presents with   • Alcohol Intoxication and Back Pain       HPI  Ashleigh Marquis is a 57 y.o. female who presents to the Emergency Department by ambulance for alcohol intoxication and back pain with an onset of today.  The patient was discharged from [place] earlier today for alcohol intoxication.  She reports drinking two pints of Vodka after being discharged and presents to the ED for alcohol intoxication. She is experiencing tremors. She is requesting pain medications for chronic low back pain rated at an 8/10. Negative for trauma, injury or falls.      REVIEW OF SYSTEMS  Pertinent positives include alcohol intoxication, chronic low back pain and tremors. Pertinent negatives include no trauma, injury or falls. All other systems reviewed and negative. See HPI for further details. C.      PAST MEDICAL HISTORY  Patient has a past medical history of Alcoholism (CMS-HCC); Anxiety; Arthritis; ASTHMA; Cancer (CMS-HCC) (1981); Chronic low back pain; Congestive heart failure (CMS-HCC); Depression; EtOH dependence (CMS-HCC); Fall; GERD (gastroesophageal reflux disease); HTN; Hypertension; Indigestion; Muscle disorder; OSTEOPOROSIS; Other specified symptom associated with female genital organs; Psychiatric disorder; PTSD (post-traumatic stress disorder); Renal disorder; Seizure (CMS-HCC); Ulcer (CMS-HCC); and Vitamin D deficiency.      SURGICAL HISTORY  Patient has a past surgical history that includes hernia repair (1977); cysto stent placemnt pre surg (10/7/2010); cystoscopy stent placement (11/9/2010); ureteroscopy (11/9/2010); lasertripsy (11/9/2010); and stent removal (11/9/2010).      SOCIAL HISTORY  Social History   Substance Use Topics   • Smoking status:  "Current Every Day Smoker     Packs/day: 0.50     Years: 20.00     Types: Cigarettes   • Smokeless tobacco: Never Used      Comment: 1/2 pack per day   • Alcohol use Yes      Comment: \"lots of vodka\" currently intoxicated      History   Drug Use No       FAMILY HISTORY  Family History   Problem Relation Age of Onset   • Heart Disease Father    • Hypertension Father    • Diabetes Father    • Cancer Paternal Grandmother    • Depression Other    • Lung Disease Neg Hx    • Stroke Neg Hx        CURRENT MEDICATIONS  Home Medications     Reviewed by Rylee Sweeney, R.N. (Registered Nurse) on 11/26/17 at 1708  Med List Status: Partial   Medication Last Dose Status   clonazepam (KLONOPIN) 1 MG Tab unk Active   fluoxetine (PROZAC) 10 MG Cap unk Active   hydrocodone-acetaminophen (NORCO) 5-325 MG Tab per tablet unk Active   lisinopril (PRINIVIL) 10 MG Tab unk Active   lorazepam (ATIVAN) 1 MG Tab unk Active   Magnesium 400 MG Cap unk Active   omeprazole (PRILOSEC) 20 MG delayed-release capsule  Active   spironolactone (ALDACTONE) 25 MG Tab unk Active                ALLERGIES  Allergies   Allergen Reactions   • Sulfa Drugs Vomiting   • Toradol Vomiting   • Neurontin [Gabapentin]      Bowel incontinence         PHYSICAL EXAM  VITAL SIGNS: /80   Pulse 86   Temp 36.2 °C (97.1 °F)   Resp 12   Ht 1.651 m (5' 5\")   Wt 81.6 kg (180 lb)   SpO2 95%   BMI 29.95 kg/m²       Constitutional: Sitting up speaking lucidly, Asking for pain medications and sedatives, Eating a sandwich, able to answer questions  HENT: Nose is normal in appearance without rhinorrhea, external ears are normal,  moist mucous membranes  Eyes: Anicteric,  pupils are equal round and reactive, there is no conjunctival drainage or pallor   Neck: The trachea is midline, there is no obvious mass or meningeal signs  Cardiovascular: Equal radial pulsation, regular rate and rhythm without murmurs gallops or rubs  Thorax & Lungs: Respiratory rate and effort are " normal. There is normal chest excursion with respiration.  No wheezes rhonchi or rales noted.  Abdomen: Abdomen is normal in appearance, normal bowel sounds, no pain with cough, nonpulsatile  :  No CVA tenderness to palpation  Musculoskeletal: No deformities noted in all 4 extremities. Actively moves all 4 extremities  Skin: Visualized skin is warm, no erythema, no rash.  Neurologic:  Cranial nerves II through XII are intact there is no focal abnormality noted.  Psychiatric: Normal mood and mentation        COURSE & MEDICAL DECISION MAKING  Nursing notes and vital signs were reviewed. (See chart for details)  The patient's records were obtained and reviewed indicating multiple ED visits for alcohol intoxication, history was obtained from the patient.    The patient presents with alcohol intoxication.    5:20 PM Initial treatment in the Emergency Department included 650 mg of Tylenol PO for chronic pain.     Additional work-up planned includes following up with [facility] or [facility].  This was discussed with the patient.    The patient was discharged home with an information sheet on alcohol intoxication and told to return immediately for any signs or symptoms listed, but specifically if tremors, seizures, hematemesis or blood in stool.  The patient agreed to the discharge precautions and follow-up plan which is documented in EPIC.      DISPOSITION:  Patient will be discharged home in stable condition.      FOLLOW UP:  The patient is referred to a primary physician for blood pressure management, diabetic screening, and for all other preventative health concerns.      go to [facility] or [facility]      FINAL IMPRESSION  1. Alcoholic intoxication without complication (CMS-HCC)          Alisson GAN (Scribe), am scribing for, and in the presence of, Sayda Rutledge M.D.    Electronically signed by: Alisson Murillo (Cayetano), 11/26/2017    Sayda GAN M.D. personally performed the  services described in this documentation, as scribed by Alisson Murillo in my presence, and it is both accurate and complete.    The note accurately reflects work and decisions made by me.  Sayda Rutledge  11/26/2017  6:32 PM

## 2017-11-27 NOTE — ED NOTES
PT IS AAOX4, PT IS IN NAD, PT IS BEING D/C, PT IS ABLE TO AMB WOI ASSITANCE. PT LEFT WOI. PT HAS NO SOB, NO N/V.

## 2017-11-27 NOTE — ED PROVIDER NOTES
"ED Provider Note    Scribed for Marcello Melton M.D. by Thais Dee. 11/27/2017  11:38 AM    Primary care provider: Pcp Pt States None  Means of arrival: Ambulance  History obtained from: Nursing staff and patient.   History limited by: Alcohol intoxication    CHIEF COMPLAINT  Chief Complaint   Patient presents with   • Alcohol Intoxication       HPI  Ashleigh Marquis is a 57 y.o. female with a history of alcohol abuse who presents to the Emergency Department after being brought in by ambulance for alcohol intoxication. Per nurse's notes, the patient was discharged from [facility] earlier this morning due to POC breathalyzer of 0.231. The patient states she had a ground level fall this morning and hit the back of her head on the ground. She complains of head pain and chest pains. She states she is currently homeless. HPI very limited secondary to alcohol intoxication    REVIEW OF SYSTEMS  ROS limited secondary to alcohol intoxication.  C.    PAST MEDICAL HISTORY  Past Medical History:   Diagnosis Date   • Alcoholism (CMS-HCC)    • Anxiety    • Arthritis    • ASTHMA    • Cancer (CMS-HCC) 1981    cervical   • Chronic low back pain    • Congestive heart failure (CMS-HCC)    • Depression    • EtOH dependence (CMS-HCC)    • Fall     alcohol related   • GERD (gastroesophageal reflux disease)    • HTN    • Hypertension    • Indigestion     GERD   • Muscle disorder    • OSTEOPOROSIS    • Other specified symptom associated with female genital organs     \"I have fibroids bad\"\"   • Psychiatric disorder    • PTSD (post-traumatic stress disorder)    • Renal disorder     Hx of stones   • Seizure (CMS-HCC)     several years ago r/t alcohol withdrawl   • Ulcer (CMS-HCC)    • Vitamin D deficiency        FAMILY HISTORY  Family History   Problem Relation Age of Onset   • Heart Disease Father    • Hypertension Father    • Diabetes Father    • Cancer Paternal Grandmother    • Depression Other    • Lung Disease Neg Hx    • Stroke Neg Hx  " "      SOCIAL HISTORY  Social History   Substance Use Topics   • Smoking status: Current Every Day Smoker     Packs/day: 0.50     Years: 20.00     Types: Cigarettes   • Smokeless tobacco: Never Used      Comment: 1/2 pack per day   • Alcohol use Yes      Comment: \"lots of vodka\" currently intoxicated     History   Drug Use No       SURGICAL HISTORY  Past Surgical History:   Procedure Laterality Date   • CYSTOSCOPY STENT PLACEMENT  11/9/2010    Performed by ANGEL HARRIS at SURGERY [facility] ORS   • URETEROSCOPY  11/9/2010    Performed by ANGEL HARRIS at SURGERY [facility] ORS   • LASERTRIPSY  11/9/2010    Performed by ANGEL HARRIS at SURGERY [facility] ORS   • STENT REMOVAL  11/9/2010    Performed by ANGEL HARRIS at SURGERY [facility] ORS   • CYSTO STENT PLACEMNT PRE SURG  10/7/2010    Performed by ANGEL HARRIS at SURGERY [facility] ORS   • HERNIA REPAIR  1977       CURRENT MEDICATIONS  Home Medications     Reviewed by Era Gaspar R.N. (Registered Nurse) on 11/27/17 at 1122  Med List Status: Unable to Obtain   Medication Last Dose Status   clonazepam (KLONOPIN) 1 MG Tab unk Active   fluoxetine (PROZAC) 10 MG Cap unk Active   hydrocodone-acetaminophen (NORCO) 5-325 MG Tab per tablet unk Active   lisinopril (PRINIVIL) 10 MG Tab unk Active   lorazepam (ATIVAN) 1 MG Tab unk Active   Magnesium 400 MG Cap unk Active   omeprazole (PRILOSEC) 20 MG delayed-release capsule  Active   spironolactone (ALDACTONE) 25 MG Tab unk Active                ALLERGIES  Allergies   Allergen Reactions   • Sulfa Drugs Vomiting   • Toradol Vomiting   • Neurontin [Gabapentin]      Bowel incontinence         PHYSICAL EXAM  VITAL SIGNS: Pulse 74   Resp 16   Ht 1.702 m (5' 7\")   Wt 81.6 kg (180 lb)   SpO2 92%   BMI 28.19 kg/m²      Reviewed and normal  Constitutional: Well developed, Well nourished.  HENT: Normocephalic, atraumatic, no wounds, hematoma, bilateral external ears normal, oropharynx moist, No exudates or " erythema.   Neck: No C-spine tenderness.   Eyes: PERRLA 4mm, conjunctiva pink, no scleral icterus.   Cardiovascular: Regular rate and rhythm. No murmurs, rubs or gallops. Vague chest tenderness.  Respiratory: Lungs clear to auscultation bilaterally. No wheezes, rales, or rhonchi.  Abdominal:  Abdomen soft, minimally tender, non distended. No rebound, or guarding.   Skin: No erythema, no rash.   Genitourinary: No costovertebral angle tenderness.   Musculoskeletal: No edema.   Neurologic: Alert & oriented only to self, follows commands poorly, slurring words.  Psychiatric: Unable to assess due to intoxication.     DIFFERENTIAL DIAGNOSIS:  Head injury, alcohol intoxication, substance abuse.    RADIOLOGY/PROCEDURES  DX-CHEST-LIMITED (1 VIEW)   Final Result      Negative single view of the chest.      Radiologist interpretation have been reviewed by me.     LABORATORY:  Results for orders placed or performed during the hospital encounter of 11/27/17   POC BREATHALIZER   Result Value Ref Range    POC Breathalizer 0.240 (A) 0.00 - 0.01 Percent   ACCU-CHEK GLUCOSE   Result Value Ref Range    Glucose - Accu-Ck 137 (H) 65 - 99 mg/dL   Lab results reviewed by me.     ED COURSE:    Reviewed the patient's past medical records, which shows she was last seen in the ED yesterday. CT-head on 11/26/2017 was normal after being intoxicated with a headache. She is a heavy drinker, but has no history of alcoholic liver disease.     11:38 AM - Patient seen and examined at bedside. Ordered DX-chest and POC breathalyzer to evaluate.     12:57 PM Nursing staff informed me POC breathalizer was 0.523, but states it is likely inaccurate given the patient is not cooperative. We will continue to monitor the patient. Xray was unremarkable.     2:41 PM Patient was reevaluated at bedside. She is now oriented to person and place. She drank and ate some Mac n' Cheese and tolerated it well without any new complaints. She has not be road-tested yet.  Imaging was discussed with the patient. We will continue to monitor the patient.     4:21 PM Nursing staff informed me the patient is going to put on makeup before being discharged. She will be discharged and should return if symptoms worsen or if new symptoms arise. The patient understands and agrees to plan.      MEDICAL DECISION MAKING:  Patient observed until she ate drank and walked. She was reassessed twice and was oriented to person and place but not time. Per chart review patient was here this morning at midnight and had a head CT yesterday which was normal. There is no evidence of recurrent head injuries since her last CT and no indication for recurrent imaging in my opinion. Patient had chest wall pain and tenderness on initial exam and chest x-ray is negative for rib fracture hemopneumothorax or pulmonary contusion.    PLAN:    Alcohol Use Disorder handout given  [facility]  [address]  [place] [address]  [phone]    Schedule an appointment as soon as possible for a visit        CONDITION: Good.     FINAL IMPRESSION  1. Alcohol abuse    2. Acute alcoholic intoxication without complication (CMS-HCC)    3. Chest wall pain      Thais GAN (Cayetano), am scribing for, and in the presence of, Marcello Melton M.D..    Electronically signed by: Thais Dee (Cayetano), 11/27/2017    Marcello GAN M.D. personally performed the services described in this documentation, as scribed by Thais Dee in my presence, and it is both accurate and complete.    The note accurately reflects work and decisions made by me.  Marcello Melton  11/27/2017  6:38 PM

## 2017-11-27 NOTE — PROGRESS NOTES
Chart reviewed.  Pt was discharged from [facility] ER x 2 11/26/17 and readmitted to [facility] ER today 11/27/17.  Pt is currently admitted to [facility] ER and does not qualify for discharge outreach phone call d/t readmission.

## 2017-11-27 NOTE — DISCHARGE INSTRUCTIONS
Alcohol Intoxication  Alcohol intoxication occurs when you drink enough alcohol that it affects your ability to function. It can be mild or very severe. Drinking a lot of alcohol in a short time is called binge drinking. This can be very harmful. Drinking alcohol can also be more dangerous if you are taking medicines or other drugs. Some of the effects caused by alcohol may include:  · Loss of coordination.  · Changes in mood and behavior.  · Unclear thinking.  · Trouble talking (slurred speech).  · Throwing up (vomiting).  · Confusion.  · Slowed breathing.  · Twitching and shaking (seizures).  · Loss of consciousness.  HOME CARE  · Do not drive after drinking alcohol.  · Drink enough water and fluids to keep your pee (urine) clear or pale yellow. Avoid caffeine.  · Only take medicine as told by your doctor.  GET HELP IF:  · You throw up (vomit) many times.  · You do not feel better after a few days.  · You frequently have alcohol intoxication. Your doctor can help decide if you should see a substance use treatment counselor.  GET HELP RIGHT AWAY IF:  · You become shaky when you stop drinking.  · You have twitching and shaking.  · You throw up blood. It may look bright red or like coffee grounds.  · You notice blood in your poop (bowel movements).  · You become lightheaded or pass out (faint).  MAKE SURE YOU:   · Understand these instructions.  · Will watch your condition.  · Will get help right away if you are not doing well or get worse.     This information is not intended to replace advice given to you by your health care provider. Make sure you discuss any questions you have with your health care provider.     Document Released: 06/05/2009 Document Revised: 08/20/2014 Document Reviewed: 05/23/2014  [employer] Interactive Patient Education ©2016 [employer]

## 2017-11-28 ENCOUNTER — HOSPITAL ENCOUNTER (EMERGENCY)
Facility: MEDICAL CENTER | Age: 57
End: 2017-11-29
Attending: EMERGENCY MEDICINE
Payer: MEDICAID

## 2017-11-28 ENCOUNTER — HOSPITAL ENCOUNTER (EMERGENCY)
Dept: HOSPITAL 8 - ED | Age: 57
Discharge: HOME | End: 2017-11-28
Payer: MEDICAID

## 2017-11-28 ENCOUNTER — PATIENT OUTREACH (OUTPATIENT)
Dept: HEALTH INFORMATION MANAGEMENT | Facility: OTHER | Age: 57
End: 2017-11-28

## 2017-11-28 VITALS — HEIGHT: 64 IN | BODY MASS INDEX: 31.99 KG/M2 | WEIGHT: 187.39 LBS

## 2017-11-28 VITALS — DIASTOLIC BLOOD PRESSURE: 78 MMHG | SYSTOLIC BLOOD PRESSURE: 147 MMHG

## 2017-11-28 VITALS — SYSTOLIC BLOOD PRESSURE: 129 MMHG | DIASTOLIC BLOOD PRESSURE: 81 MMHG

## 2017-11-28 VITALS — BODY MASS INDEX: 37.26 KG/M2 | WEIGHT: 218.26 LBS | HEIGHT: 64 IN

## 2017-11-28 DIAGNOSIS — F10.10 CHRONIC ALCOHOL ABUSE: ICD-10-CM

## 2017-11-28 DIAGNOSIS — F10.920 ALCOHOLIC INTOXICATION WITHOUT COMPLICATION (HCC): ICD-10-CM

## 2017-11-28 DIAGNOSIS — F10.220: Primary | ICD-10-CM

## 2017-11-28 DIAGNOSIS — F10.221: Primary | ICD-10-CM
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
## 2017-11-28 PROCEDURE — 80307 DRUG TEST PRSMV CHEM ANLYZR: CPT

## 2017-11-28 PROCEDURE — 36415 COLL VENOUS BLD VENIPUNCTURE: CPT

## 2017-11-28 PROCEDURE — 700102 HCHG RX REV CODE 250 W/ 637 OVERRIDE(OP): Performed by: EMERGENCY MEDICINE

## 2017-11-28 PROCEDURE — G0479 DRUG TEST PRESUMP NOT OPT: HCPCS

## 2017-11-28 PROCEDURE — 99283 EMERGENCY DEPT VISIT LOW MDM: CPT

## 2017-11-28 PROCEDURE — A9270 NON-COVERED ITEM OR SERVICE: HCPCS | Performed by: EMERGENCY MEDICINE

## 2017-11-28 PROCEDURE — 99284 EMERGENCY DEPT VISIT MOD MDM: CPT

## 2017-11-28 PROCEDURE — 700105 HCHG RX REV CODE 258: Performed by: EMERGENCY MEDICINE

## 2017-11-28 RX ORDER — SODIUM CHLORIDE 9 MG/ML
1000 INJECTION, SOLUTION INTRAVENOUS ONCE
Status: COMPLETED | OUTPATIENT
Start: 2017-11-28 | End: 2017-11-28

## 2017-11-28 RX ORDER — ACETAMINOPHEN 325 MG/1
650 TABLET ORAL ONCE
Status: COMPLETED | OUTPATIENT
Start: 2017-11-28 | End: 2017-11-28

## 2017-11-28 RX ADMIN — SODIUM CHLORIDE 1000 ML: 9 INJECTION, SOLUTION INTRAVENOUS at 22:24

## 2017-11-28 RX ADMIN — ACETAMINOPHEN 650 MG: 325 TABLET, FILM COATED ORAL at 22:24

## 2017-11-28 ASSESSMENT — PAIN SCALES - GENERAL: PAINLEVEL_OUTOF10: 9

## 2017-11-28 NOTE — ED NOTES
"Pt discharge home. Pt given discharge instructions pt did not want d/c paper she said \"i already know, dont drink blah,argelia,argelia,. Pt steady on feet upon discharge  "

## 2017-11-28 NOTE — LETTER
Ashleigh Marquis  [address]   [place] [address]    November 28, 2017      Dear Ashleigh Marquis,    [facility] wants to ensure your discharge home is safe and you or your loved ones have had all of your questions answered regarding your care after you leave the hospital.    Our discharge team was unsuccessful in our attempts to contact you telephonically and we wanted to be sure that you had a list of resources and contact information should you have any questions regarding your hospital stay, follow-up instructions, or active medical symptoms.    Questions or Concerns Regarding… Contact   Medical Questions Related to Your Discharge  (7 days a week, 8am-5pm) Contact a Nurse Care Coordinator   [phone]   Medical Questions Not Related to Your Discharge  (24 hours a day / 7 days a week)  Contact the Nurse Health Line   [phone]    Medications or Discharge Instructions Refer to your discharge packet   or contact your -[phone]   Follow-up Appointment(s) Schedule your appointment via [employer]   or contact Scheduling [phone]   Billing Review your statement via [employer]  or contact Billing [phone]   Medical Records Review your records via [employer]   or contact Medical Records [phone]     You can also easily access your medical information, test results and upcoming appointments via the [employer] free online health management tool. You can learn more and sign up at [email or web address]. For assistance setting up your [employer] account, please call [phone].    Once again, we want to ensure your discharge home is safe and that you have a clear understanding of any next steps in your care. If you have any questions or concerns, please do not hesitate to contact us, we are here for you. Thank you for choosing [facility] for your healthcare needs.    Sincerely,      Your [facility] Healthcare Team

## 2017-11-28 NOTE — DISCHARGE INSTRUCTIONS
Alcohol Use Disorder  Alcohol use disorder is a mental disorder. It is not a one-time incident of heavy drinking. Alcohol use disorder is the excessive and uncontrollable use of alcohol over time that leads to problems with functioning in one or more areas of daily living. People with this disorder risk harming themselves and others when they drink to excess. Alcohol use disorder also can cause other mental disorders, such as mood and anxiety disorders, and serious physical problems. People with alcohol use disorder often misuse other drugs.   Alcohol use disorder is common and widespread. Some people with this disorder drink alcohol to cope with or escape from negative life events. Others drink to relieve chronic pain or symptoms of mental illness. People with a family history of alcohol use disorder are at higher risk of losing control and using alcohol to excess.   Drinking too much alcohol can cause injury, accidents, and health problems. One drink can be too much when you are:  · Working.  · Pregnant or breastfeeding.  · Taking medicines. Ask your doctor.  · Driving or planning to drive.  SYMPTOMS   Signs and symptoms of alcohol use disorder may include the following:   · Consumption of alcohol in larger amounts or over a longer period of time than intended.  · Multiple unsuccessful attempts to cut down or control alcohol use.    · A great deal of time spent obtaining alcohol, using alcohol, or recovering from the effects of alcohol (hangover).  · A strong desire or urge to use alcohol (cravings).    · Continued use of alcohol despite problems at work, school, or home because of alcohol use.    · Continued use of alcohol despite problems in relationships because of alcohol use.  · Continued use of alcohol in situations when it is physically hazardous, such as driving a car.  · Continued use of alcohol despite awareness of a physical or psychological problem that is likely related to alcohol use. Physical  problems related to alcohol use can involve the brain, heart, liver, stomach, and intestines. Psychological problems related to alcohol use include intoxication, depression, anxiety, psychosis, delirium, and dementia.    · The need for increased amounts of alcohol to achieve the same desired effect, or a decreased effect from the consumption of the same amount of alcohol (tolerance).  · Withdrawal symptoms upon reducing or stopping alcohol use, or alcohol use to reduce or avoid withdrawal symptoms. Withdrawal symptoms include:  ¨ Racing heart.  ¨ Hand tremor.  ¨ Difficulty sleeping.  ¨ Nausea.  ¨ Vomiting.  ¨ Hallucinations.  ¨ Restlessness.  ¨ Seizures.  DIAGNOSIS  Alcohol use disorder is diagnosed through an assessment by your health care provider. Your health care provider may start by asking three or four questions to screen for excessive or problematic alcohol use. To confirm a diagnosis of alcohol use disorder, at least two symptoms must be present within a 12-month period. The severity of alcohol use disorder depends on the number of symptoms:  · Mild--two or three.  · Moderate--four or five.  · Severe--six or more.  Your health care provider may perform a physical exam or use results from lab tests to see if you have physical problems resulting from alcohol use. Your health care provider may refer you to a mental health professional for evaluation.  TREATMENT   Some people with alcohol use disorder are able to reduce their alcohol use to low-risk levels. Some people with alcohol use disorder need to quit drinking alcohol. When necessary, mental health professionals with specialized training in substance use treatment can help. Your health care provider can help you decide how severe your alcohol use disorder is and what type of treatment you need. The following forms of treatment are available:   · Detoxification. Detoxification involves the use of prescription medicines to prevent alcohol withdrawal  symptoms in the first week after quitting. This is important for people with a history of symptoms of withdrawal and for heavy drinkers who are likely to have withdrawal symptoms. Alcohol withdrawal can be dangerous and, in severe cases, cause death. Detoxification is usually provided in a hospital or in-patient substance use treatment facility.  · Counseling or talk therapy. Talk therapy is provided by substance use treatment counselors. It addresses the reasons people use alcohol and ways to keep them from drinking again. The goals of talk therapy are to help people with alcohol use disorder find healthy activities and ways to cope with life stress, to identify and avoid triggers for alcohol use, and to handle cravings, which can cause relapse.  · Medicines. Different medicines can help treat alcohol use disorder through the following actions:  ¨ Decrease alcohol cravings.  ¨ Decrease the positive reward response felt from alcohol use.  ¨ Produce an uncomfortable physical reaction when alcohol is used (aversion therapy).  · Support groups. Support groups are run by people who have quit drinking. They provide emotional support, advice, and guidance.  These forms of treatment are often combined. Some people with alcohol use disorder benefit from intensive combination treatment provided by specialized substance use treatment centers. Both inpatient and outpatient treatment programs are available.     This information is not intended to replace advice given to you by your health care provider. Make sure you discuss any questions you have with your health care provider.     Document Released: 01/25/2006 Document Revised: 01/08/2016 Document Reviewed: 03/27/2014  [employer] Interactive Patient Education ©2016 [employer]

## 2017-11-28 NOTE — PROGRESS NOTES
"Placed discharge outreach phone call to patient s/p ER discharge 11/27/17.  Female answered and stated \"wrong number.\"  Discharge outreach letter mailed to patient at address listed in Epic record.    "

## 2017-11-29 ENCOUNTER — HOSPITAL ENCOUNTER (EMERGENCY)
Facility: MEDICAL CENTER | Age: 57
End: 2017-11-30
Attending: EMERGENCY MEDICINE
Payer: MEDICAID

## 2017-11-29 ENCOUNTER — HOSPITAL ENCOUNTER (EMERGENCY)
Dept: HOSPITAL 8 - ED | Age: 57
Discharge: HOME | End: 2017-11-29
Payer: MEDICAID

## 2017-11-29 ENCOUNTER — HOSPITAL ENCOUNTER (EMERGENCY)
Facility: MEDICAL CENTER | Age: 57
End: 2017-11-29
Attending: EMERGENCY MEDICINE
Payer: MEDICAID

## 2017-11-29 VITALS
SYSTOLIC BLOOD PRESSURE: 105 MMHG | RESPIRATION RATE: 16 BRPM | HEIGHT: 65 IN | TEMPERATURE: 97.9 F | WEIGHT: 180 LBS | OXYGEN SATURATION: 92 % | DIASTOLIC BLOOD PRESSURE: 69 MMHG | BODY MASS INDEX: 29.99 KG/M2 | HEART RATE: 81 BPM

## 2017-11-29 VITALS
RESPIRATION RATE: 16 BRPM | DIASTOLIC BLOOD PRESSURE: 80 MMHG | HEART RATE: 79 BPM | TEMPERATURE: 98 F | HEIGHT: 65 IN | BODY MASS INDEX: 29.99 KG/M2 | WEIGHT: 180 LBS | SYSTOLIC BLOOD PRESSURE: 118 MMHG

## 2017-11-29 VITALS — SYSTOLIC BLOOD PRESSURE: 123 MMHG | DIASTOLIC BLOOD PRESSURE: 75 MMHG

## 2017-11-29 DIAGNOSIS — T74.21XA SEXUAL ASSAULT OF ADULT, INITIAL ENCOUNTER: ICD-10-CM

## 2017-11-29 DIAGNOSIS — S00.31XA: Primary | ICD-10-CM

## 2017-11-29 DIAGNOSIS — F32.9: ICD-10-CM
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
## 2017-11-29 DIAGNOSIS — F10.229: ICD-10-CM

## 2017-11-29 DIAGNOSIS — I10: ICD-10-CM

## 2017-11-29 DIAGNOSIS — F10.929 ALCOHOLIC INTOXICATION WITH COMPLICATION (HCC): ICD-10-CM
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
## 2017-11-29 DIAGNOSIS — F10.920 ALCOHOLIC INTOXICATION WITHOUT COMPLICATION (HCC): ICD-10-CM

## 2017-11-29 LAB
ANION GAP SERPL CALC-SCNC: 11 MMOL/L (ref 0–11.9)
BASOPHILS # BLD AUTO: 1 % (ref 0–1.8)
BASOPHILS # BLD: 0.08 K/UL (ref 0–0.12)
BUN SERPL-MCNC: 14 MG/DL (ref 8–22)
CALCIUM SERPL-MCNC: 8.7 MG/DL (ref 8.5–10.5)
CHLORIDE SERPL-SCNC: 103 MMOL/L (ref 96–112)
CO2 SERPL-SCNC: 28 MMOL/L (ref 20–33)
CREAT SERPL-MCNC: 0.66 MG/DL (ref 0.5–1.4)
EOSINOPHIL # BLD AUTO: 0.1 K/UL (ref 0–0.51)
EOSINOPHIL NFR BLD: 1.3 % (ref 0–6.9)
ERYTHROCYTE [DISTWIDTH] IN BLOOD BY AUTOMATED COUNT: 49.1 FL (ref 35.9–50)
GFR SERPL CREATININE-BSD FRML MDRD: >60 ML/MIN/1.73 M 2
GLUCOSE SERPL-MCNC: 75 MG/DL (ref 65–99)
HCT VFR BLD AUTO: 37.2 % (ref 37–47)
HGB BLD-MCNC: 12 G/DL (ref 12–16)
IMM GRANULOCYTES # BLD AUTO: 0.04 K/UL (ref 0–0.11)
IMM GRANULOCYTES NFR BLD AUTO: 0.5 % (ref 0–0.9)
LYMPHOCYTES # BLD AUTO: 2.26 K/UL (ref 1–4.8)
LYMPHOCYTES NFR BLD: 28.9 % (ref 22–41)
MCH RBC QN AUTO: 29.1 PG (ref 27–33)
MCHC RBC AUTO-ENTMCNC: 32.3 G/DL (ref 33.6–35)
MCV RBC AUTO: 90.3 FL (ref 81.4–97.8)
MONOCYTES # BLD AUTO: 0.39 K/UL (ref 0–0.85)
MONOCYTES NFR BLD AUTO: 5 % (ref 0–13.4)
NEUTROPHILS # BLD AUTO: 4.94 K/UL (ref 2–7.15)
NEUTROPHILS NFR BLD: 63.3 % (ref 44–72)
NRBC # BLD AUTO: 0 K/UL
NRBC BLD AUTO-RTO: 0 /100 WBC
PLATELET # BLD AUTO: 152 K/UL (ref 164–446)
PMV BLD AUTO: 10.1 FL (ref 9–12.9)
POTASSIUM SERPL-SCNC: 3 MMOL/L (ref 3.6–5.5)
RBC # BLD AUTO: 4.12 M/UL (ref 4.2–5.4)
SODIUM SERPL-SCNC: 142 MMOL/L (ref 135–145)
WBC # BLD AUTO: 7.8 K/UL (ref 4.8–10.8)

## 2017-11-29 PROCEDURE — 99284 EMERGENCY DEPT VISIT MOD MDM: CPT | Mod: 27

## 2017-11-29 PROCEDURE — 96372 THER/PROPH/DIAG INJ SC/IM: CPT

## 2017-11-29 PROCEDURE — 700102 HCHG RX REV CODE 250 W/ 637 OVERRIDE(OP): Performed by: EMERGENCY MEDICINE

## 2017-11-29 PROCEDURE — 70450 CT HEAD/BRAIN W/O DYE: CPT

## 2017-11-29 PROCEDURE — 87591 N.GONORRHOEAE DNA AMP PROB: CPT

## 2017-11-29 PROCEDURE — 700111 HCHG RX REV CODE 636 W/ 250 OVERRIDE (IP): Performed by: EMERGENCY MEDICINE

## 2017-11-29 PROCEDURE — 87491 CHLMYD TRACH DNA AMP PROBE: CPT

## 2017-11-29 PROCEDURE — 99284 EMERGENCY DEPT VISIT MOD MDM: CPT

## 2017-11-29 PROCEDURE — 80048 BASIC METABOLIC PNL TOTAL CA: CPT

## 2017-11-29 PROCEDURE — 85025 COMPLETE CBC W/AUTO DIFF WBC: CPT

## 2017-11-29 PROCEDURE — A9270 NON-COVERED ITEM OR SERVICE: HCPCS | Performed by: EMERGENCY MEDICINE

## 2017-11-29 RX ORDER — CEFTRIAXONE SODIUM 250 MG/1
250 INJECTION, POWDER, FOR SOLUTION INTRAMUSCULAR; INTRAVENOUS ONCE
Status: COMPLETED | OUTPATIENT
Start: 2017-11-29 | End: 2017-11-29

## 2017-11-29 RX ORDER — AZITHROMYCIN 250 MG/1
1000 TABLET, FILM COATED ORAL ONCE
Status: COMPLETED | OUTPATIENT
Start: 2017-11-29 | End: 2017-11-29

## 2017-11-29 RX ORDER — ONDANSETRON 4 MG/1
4 TABLET, ORALLY DISINTEGRATING ORAL ONCE
Status: COMPLETED | OUTPATIENT
Start: 2017-11-29 | End: 2017-11-29

## 2017-11-29 RX ADMIN — AZITHROMYCIN 1000 MG: 250 TABLET, FILM COATED ORAL at 22:40

## 2017-11-29 RX ADMIN — CEFTRIAXONE SODIUM 250 MG: 250 INJECTION, POWDER, FOR SOLUTION INTRAMUSCULAR; INTRAVENOUS at 22:40

## 2017-11-29 RX ADMIN — ONDANSETRON 4 MG: 4 TABLET, ORALLY DISINTEGRATING ORAL at 01:45

## 2017-11-29 ASSESSMENT — LIFESTYLE VARIABLES
HAVE PEOPLE ANNOYED YOU BY CRITICIZING YOUR DRINKING: NO
EVER FELT BAD OR GUILTY ABOUT YOUR DRINKING: NO
CONSUMPTION TOTAL: INCOMPLETE
TOTAL SCORE: 0
DO YOU DRINK ALCOHOL: YES
TOTAL SCORE: 0
EVER HAD A DRINK FIRST THING IN THE MORNING TO STEADY YOUR NERVES TO GET RID OF A HANGOVER: NO
DO YOU DRINK ALCOHOL: NO
HAVE YOU EVER FELT YOU SHOULD CUT DOWN ON YOUR DRINKING: NO
TOTAL SCORE: 0

## 2017-11-29 ASSESSMENT — PAIN SCALES - GENERAL: PAINLEVEL_OUTOF10: 2

## 2017-11-29 NOTE — ED NOTES
The pt got out of bed to check her phone, this RN asked the pt how she was feeling, the pt ignored this RN and got back in bed. This RN asked the pt if a settle of vitals could be taken, the pt stated no, she wanted to be left alone.

## 2017-11-29 NOTE — ED NOTES
Pt transferred to green 31, placed on monitor, belongings placed in belongings bags and stored out of reach of MD ayse at bedside.

## 2017-11-29 NOTE — ED NOTES
"Chief Complaint   Patient presents with   • Alcohol Intoxication     seen today     /80   Pulse 79   Temp 36.7 °C (98 °F)   Resp 16   Ht 1.651 m (5' 5\")   Wt 81.6 kg (180 lb)   BMI 29.95 kg/m²   Patient brought in by [facility] for alcohol intoxication. Patient was just seen today 11/29/17 for similar complaint and discharge only a few hours ago. Patient walked in with STEADY gait, [facility] Patient sleeping in bed.  "

## 2017-11-29 NOTE — ED NOTES
Pt got up and got purse out of belongings bag, she pulled an empty vodka bottle out of her purse and attempted to take own medications. This RN with assistance of a second RN and security removed the pt's purse from the room until pt is ready for discharge.

## 2017-11-29 NOTE — DISCHARGE INSTRUCTIONS
Alcohol Abuse and Nutrition  Alcohol abuse is any pattern of alcohol consumption that harms your health, relationships, or work. Alcohol abuse can affect how your body breaks down and absorbs nutrients from food by causing your liver to work abnormally. Additionally, many people who abuse alcohol do not eat enough carbohydrates, protein, fat, vitamins, and minerals. This can cause poor nutrition (malnutrition) and a lack of nutrients (nutrient deficiencies), which can lead to further complications.  Nutrients that are commonly lacking (deficient) among people who abuse alcohol include:  · Vitamins.  ¨ Vitamin A. This is stored in your liver. It is important for your vision, metabolism, and ability to fight off infections (immunity).  ¨ B vitamins. These include vitamins such as folate, thiamin, and niacin. These are important in new cell growth and maintenance.  ¨ Vitamin C. This plays an important role in iron absorption, wound healing, and immunity.  ¨ Vitamin D. This is produced by your liver, but you can also get vitamin D from food. Vitamin D is necessary for your body to absorb and use calcium.  · Minerals.  ¨ Calcium. This is important for your bones and your heart and blood vessel (cardiovascular) function.  ¨ Iron. This is important for blood, muscle, and nervous system functioning.  ¨ Magnesium. This plays an important role in muscle and nerve function, and it helps to control blood sugar and blood pressure.  ¨ Zinc. This is important for the normal function of your nervous system and digestive system (gastrointestinal tract).  Nutrition is an essential component of therapy for alcohol abuse. Your health care provider or dietitian will work with you to design a plan that can help restore nutrients to your body and prevent potential complications.  WHAT IS MY PLAN?  Your dietitian may develop a specific diet plan that is based on your condition and any other complications you may have. A diet plan will  commonly include:  · A balanced diet.  ¨ Grains: 6-8 oz per day.  ¨ Vegetables: 2-3 cups per day.  ¨ Fruits: 1-2 cups per day.  ¨ Meat and other protein: 5-6 oz per day.  ¨ Dairy: 2-3 cups per day.  · Vitamin and mineral supplements.  WHAT DO I NEED TO KNOW ABOUT ALCOHOL AND NUTRITION?  · Consume foods that are high in antioxidants, such as grapes, berries, nuts, green tea, and dark green and orange vegetables. This can help to counteract some of the stress that is placed on your liver by consuming alcohol.  · Avoid food and drinks that are high in fat and sugar. Foods such as sugared soft drinks, salty snack foods, and candy contain empty calories. This means that they lack important nutrients such as protein, fiber, and vitamins.  · Eat frequent meals and snacks. Try to eat 5-6 small meals each day.  · Eat a variety of fresh fruits and vegetables each day. This will help you get plenty of water, fiber, and vitamins in your diet.  · Drink plenty of water and other clear fluids. Try to drink at least 48-64 oz (1.5-2 L) of water per day.  · If you are a vegetarian, eat a variety of protein-rich foods. Pair whole grains with plant-based proteins at meals and snacks to obtain the greatest nutrient benefit from your food. For example, eat rice with beans, put peanut butter on whole-grain toast, or eat oatmeal with sunflower seeds.  · Soak beans and whole grains overnight before cooking. This can help your body to absorb the nutrients more easily.  · Include foods fortified with vitamins and minerals in your diet. Commonly fortified foods include milk, orange juice, cereal, and bread.  · If you are malnourished, your dietitian may recommend a high-protein, high-calorie diet. This may include:  ¨ 2,000-3,000 calories (kilocalories) per day.  ¨  grams of protein per day.  · Your health care provider may recommend a complete nutritional supplement beverage. This can help to restore calories, protein, and vitamins to  your body. Depending on your condition, you may be advised to consume this instead of or in addition to meals.  · Limit your intake of caffeine. Replace drinks like coffee and black tea with decaffeinated coffee and herbal tea.  · Eat a variety of foods that are high in omega fatty acids. These include fish, nuts and seeds, and soybeans. These foods may help your liver to recover and may also stabilize your mood.  · Certain medicines may cause changes in your appetite, taste, and weight. Work with your health care provider and dietitian to make any adjustments to your medicines and diet plan.  · Include other healthy lifestyle choices in your daily routine.  ¨ Be physically active.  ¨ Get enough sleep.  ¨ Spend time doing activities that you enjoy.  · If you are unable to take in enough food and calories by mouth, your health care provider may recommend a feeding tube. This is a tube that passes through your nose and throat, directly into your stomach. Nutritional supplement beverages can be given to you through the feeding tube to help you get the nutrients you need.  · Take vitamin or mineral supplements as recommended by your health care provider.  WHAT FOODS CAN I EAT?  Grains  Enriched pasta. Enriched rice. Fortified whole-grain bread. Fortified whole-grain cereal. Barley. Brown rice. Quinoa. Millet.  Vegetables  All fresh, frozen, and canned vegetables. Spinach. Kale. Artichoke. Carrots. Winter squash and pumpkin. Sweet potatoes. Broccoli. Cabbage. Cucumbers. Tomatoes. Sweet peppers. Green beans. Peas. Corn.  Fruits  All fresh and frozen fruits. Berries. Grapes. Duong. Papaya. Guava. Cherries. Apples. Bananas. Peaches. Plums. Pineapple. Watermelon. Cantaloupe. Oranges. Avocado.  Meats and Other Protein Sources  Beef liver. Lean beef. Pork. Fresh and canned chicken. Fresh fish. Oysters. Sardines. Canned tuna. Shrimp. Eggs with yolks. Nuts and seeds. Peanut butter. Beans and lentils. Soybeans.  Tofu.  Dairy  Whole, low-fat, and nonfat milk. Whole, low-fat, and nonfat yogurt. Cottage cheese. Sour cream. Hard and soft cheeses.  Beverages  Water. Herbal tea. Decaffeinated coffee. Decaffeinated green tea. 100% fruit juice. 100% vegetable juice. Instant breakfast shakes.  Condiments  Ketchup. Mayonnaise. Mustard. Salad dressing. Barbecue sauce.  Sweets and Desserts  Sugar-free ice cream. Sugar-free pudding. Sugar-free gelatin.  Fats and Oils  Butter. Vegetable oil, flaxseed oil, olive oil, and walnut oil.  Other  Complete nutrition shakes. Protein bars. Sugar-free gum.  The items listed above may not be a complete list of recommended foods or beverages. Contact your dietitian for more options.  WHAT FOODS ARE NOT RECOMMENDED?  Grains  Sugar-sweetened breakfast cereals. Flavored instant oatmeal. Fried breads.  Vegetables  Breaded or deep-fried vegetables.  Fruits  Dried fruit with added sugar. Candied fruit. Canned fruit in syrup.  Meats and Other Protein Sources  Breaded or deep-fried meats.  Dairy  Flavored milks. Fried cheese curds or fried cheese sticks.  Beverages  Alcohol. Sugar-sweetened soft drinks. Sugar-sweetened tea. Caffeinated coffee and tea.  Condiments  Sugar. Honey. Agave nectar. Molasses.  Sweets and Desserts  Chocolate. Cake. Cookies. Candy.  Other  Potato chips. Pretzels. Salted nuts. Candied nuts.  The items listed above may not be a complete list of foods and beverages to avoid. Contact your dietitian for more information.     This information is not intended to replace advice given to you by your health care provider. Make sure you discuss any questions you have with your health care provider.     Document Released: 10/12/2006 Document Revised: 01/08/2016 Document Reviewed: 07/21/2015  [employer] Interactive Patient Education ©2016 [employer]    Alcohol Intoxication  Alcohol intoxication occurs when the amount of alcohol that a person has consumed impairs his or her ability to mentally and  "physically function. Alcohol directly impairs the normal chemical activity of the brain. Drinking large amounts of alcohol can lead to changes in mental function and behavior, and it can cause many physical effects that can be harmful.   Alcohol intoxication can range in severity from mild to very severe. Various factors can affect the level of intoxication that occurs, such as the person's age, gender, weight, frequency of alcohol consumption, and the presence of other medical conditions (such as diabetes, seizures, or heart conditions). Dangerous levels of alcohol intoxication may occur when people drink large amounts of alcohol in a short period (binge drinking). Alcohol can also be especially dangerous when combined with certain prescription medicines or \"recreational\" drugs.  SIGNS AND SYMPTOMS  Some common signs and symptoms of mild alcohol intoxication include:  · Loss of coordination.  · Changes in mood and behavior.  · Impaired judgment.  · Slurred speech.  As alcohol intoxication progresses to more severe levels, other signs and symptoms will appear. These may include:  · Vomiting.  · Confusion and impaired memory.  · Slowed breathing.  · Seizures.  · Loss of consciousness.  DIAGNOSIS   Your health care provider will take a medical history and perform a physical exam. You will be asked about the amount and type of alcohol you have consumed. Blood tests will be done to measure the concentration of alcohol in your blood. In many places, your blood alcohol level must be lower than 80 mg/dL (0.08%) to legally drive. However, many dangerous effects of alcohol can occur at much lower levels.   TREATMENT   People with alcohol intoxication often do not require treatment. Most of the effects of alcohol intoxication are temporary, and they go away as the alcohol naturally leaves the body. Your health care provider will monitor your condition until you are stable enough to go home. Fluids are sometimes given through " an IV access tube to help prevent dehydration.   HOME CARE INSTRUCTIONS  · Do not drive after drinking alcohol.  · Stay hydrated. Drink enough water and fluids to keep your urine clear or pale yellow. Avoid caffeine.    · Only take over-the-counter or prescription medicines as directed by your health care provider.    SEEK MEDICAL CARE IF:   · You have persistent vomiting.    · You do not feel better after a few days.  · You have frequent alcohol intoxication. Your health care provider can help determine if you should see a substance use treatment counselor.  SEEK IMMEDIATE MEDICAL CARE IF:   · You become shaky or tremble when you try to stop drinking.    · You shake uncontrollably (seizure).    · You throw up (vomit) blood. This may be bright red or may look like black coffee grounds.    · You have blood in your stool. This may be bright red or may appear as a black, tarry, bad smelling stool.    · You become lightheaded or faint.    MAKE SURE YOU:   · Understand these instructions.  · Will watch your condition.  · Will get help right away if you are not doing well or get worse.     This information is not intended to replace advice given to you by your health care provider. Make sure you discuss any questions you have with your health care provider.     Document Released: 09/27/2006 Document Revised: 08/20/2014 Document Reviewed: 05/23/2014  [employer] Interactive Patient Education ©2016 [employer]

## 2017-11-29 NOTE — ED NOTES
".  Chief Complaint   Patient presents with   • Alcohol Intoxication     Unknown amount, slurred speech, unable to stand at this time.    • Headache   • N/V     To triage in wheelchair.  Assisted from floor in lobby*2. SIMONE.      /76   Pulse 85   Temp 36.6 °C (97.9 °F)   Resp 18   Ht 1.651 m (5' 5\")   Wt 81.6 kg (180 lb)   SpO2 94%   BMI 29.95 kg/m²     "

## 2017-11-29 NOTE — ED PROVIDER NOTES
"ED Provider Note    Scribed for Sayda Rutledge M.D. by Jw Witt. 11/29/2017, 6:03 AM.    Primary care provider: Pcp Pt States None  Means of arrival: Ambulance  History obtained from: Patient  History limited by: The patient's altered mental status    CHIEF COMPLAINT  Chief Complaint   Patient presents with   • Alcohol Intoxication     seen today     HPI  Ashleigh Marquis is a 57 y.o. female who presents to the Emergency Department complaining of acute alcohol intoxication, onset last night. Patient was seen in the ED earlier today for similar symptoms. The patient states that she does not want to go home.     Full HPI Limited by the patient's altered mental status.     REVIEW OF SYSTEMS  Full ROS Unattainable secondary to the patient's altered mental status.   C.     PAST MEDICAL HISTORY   has a past medical history of Alcoholism (CMS-HCC); Anxiety; Arthritis; ASTHMA; Cancer (CMS-HCC) (1981); Chronic low back pain; Congestive heart failure (CMS-HCC); Depression; EtOH dependence (CMS-HCC); Fall; GERD (gastroesophageal reflux disease); HTN; Hypertension; Indigestion; Muscle disorder; OSTEOPOROSIS; Other specified symptom associated with female genital organs; Psychiatric disorder; PTSD (post-traumatic stress disorder); Renal disorder; Seizure (CMS-HCC); Ulcer (CMS-HCC); and Vitamin D deficiency.    SURGICAL HISTORY   has a past surgical history that includes hernia repair (1977); cysto stent placemnt pre surg (10/7/2010); cystoscopy stent placement (11/9/2010); ureteroscopy (11/9/2010); lasertripsy (11/9/2010); and stent removal (11/9/2010).    SOCIAL HISTORY  Social History   Substance Use Topics   • Smoking status: Current Every Day Smoker     Packs/day: 0.50     Years: 20.00     Types: Cigarettes   • Smokeless tobacco: Never Used      Comment: 1/2 pack per day   • Alcohol use Yes      Comment: \"lots of vodka\" currently intoxicated      History   Drug Use No     FAMILY HISTORY  Family History   Problem " "Relation Age of Onset   • Heart Disease Father    • Hypertension Father    • Diabetes Father    • Cancer Paternal Grandmother    • Depression Other    • Lung Disease Neg Hx    • Stroke Neg Hx      CURRENT MEDICATIONS  Home Medications     Reviewed by Romero Hess R.N. (Registered Nurse) on 11/29/17 at 0539  Med List Status: <None>   Medication Last Dose Status   clonazepam (KLONOPIN) 1 MG Tab unk Active   fluoxetine (PROZAC) 10 MG Cap unk Active   hydrocodone-acetaminophen (NORCO) 5-325 MG Tab per tablet unk Active   lisinopril (PRINIVIL) 10 MG Tab unk Active   lorazepam (ATIVAN) 1 MG Tab unk Active   Magnesium 400 MG Cap unk Active   omeprazole (PRILOSEC) 20 MG delayed-release capsule  Active   spironolactone (ALDACTONE) 25 MG Tab unk Active              ALLERGIES  Allergies   Allergen Reactions   • Sulfa Drugs Vomiting   • Toradol Vomiting   • Neurontin [Gabapentin]      Bowel incontinence       PHYSICAL EXAM  VITAL SIGNS: /80   Pulse 79   Temp 36.7 °C (98 °F)   Resp 16   Ht 1.651 m (5' 5\")   Wt 81.6 kg (180 lb)   BMI 29.95 kg/m²   Constitutional:  Sleeping but arousable, able to answer questions  HENT: Nose is normal in appearance without rhinorrhea, external ears are normal,  moist mucous membranes  Eyes: Anicteric,  pupils are equal round and reactive, there is no conjunctival drainage or pallor   Neck: The trachea is midline, there is no obvious mass or meningeal signs  Cardiovascular: Equal radial pulsation, regular rate and rhythm without murmurs gallops or rubs  Thorax & Lungs: Respiratory rate and effort are normal. There is normal chest excursion with respiration.   Abdomen: Abdomen is normal in appearance  :  No CVA tenderness to palpation  Musculoskeletal: No deformities noted in all 4 extremities. Actively moves all 4 extremities  Skin: Visualized skin is warm, no erythema, no rash.  Neurologic: Sleeping, but awakens and answers questions, there is no focal abnormality " noted.    DIAGNOSTIC STUDIES / PROCEDURES  None    COURSE & MEDICAL DECISION MAKING  Nursing notes and vital signs were reviewed. (See chart for details)  The patient's  records were reviewed, history was obtained from the patient. She has been seen multiple times in the ED for alcohol intoxication. Most recently 11/29/2017.     The patient presents with acute alcohol intoxication.     6:03 AM Patient seen and examined at bedside. She states that she has stopped drinking, however she was seen last night for alcohol intoxication.     6:10 AM I contacted [facility] to discuss arranging a plan of care. They report that they refuse to take her since she was in the waiting room last night and called 911. [facility] reports the patient does not want help and if no acute medical problems she can be discharged home.     6:13 AM  The patient is ambulatory and will be discharged home.     The patient was discharged home with an information sheet on alcohol intoxication and told to return immediately for any signs or symptoms listed. The patient agreed to the discharge precautions and follow-up plan which is documented in EPIC.     The patient will return for new or worsening symptoms and is stable at the time of discharge.    The patient is referred to a primary physician for blood pressure management, diabetic screening, and for all other preventative health concerns.    DISPOSITION:  Patient will be discharged home in stable condition.    FOLLOW UP:  [address] [address]  [phone]    You need help with your alcohol use.  Please go to AA and use resources available to you to stop drinking    FINAL IMPRESSION  1. Alcoholic intoxication without complication (CMS-HCC)        Jw GAN (Scrjarone), am scribing for, and in the presence of, Sayda Rutledge M.D.    Electronically signed by: Jw Martins), 11/29/2017    Sayda GAN M.D. personally performed the  services described in this documentation, as scribed by Jw Witt in my presence, and it is both accurate and complete.    The note accurately reflects work and decisions made by me.  Sayda Rutledge  11/29/2017  11:02 AM

## 2017-11-29 NOTE — DISCHARGE INSTRUCTIONS
"Alcohol Intoxication  Alcohol intoxication occurs when the amount of alcohol that a person has consumed impairs his or her ability to mentally and physically function. Alcohol directly impairs the normal chemical activity of the brain. Drinking large amounts of alcohol can lead to changes in mental function and behavior, and it can cause many physical effects that can be harmful.   Alcohol intoxication can range in severity from mild to very severe. Various factors can affect the level of intoxication that occurs, such as the person's age, gender, weight, frequency of alcohol consumption, and the presence of other medical conditions (such as diabetes, seizures, or heart conditions). Dangerous levels of alcohol intoxication may occur when people drink large amounts of alcohol in a short period (binge drinking). Alcohol can also be especially dangerous when combined with certain prescription medicines or \"recreational\" drugs.  SIGNS AND SYMPTOMS  Some common signs and symptoms of mild alcohol intoxication include:  · Loss of coordination.  · Changes in mood and behavior.  · Impaired judgment.  · Slurred speech.  As alcohol intoxication progresses to more severe levels, other signs and symptoms will appear. These may include:  · Vomiting.  · Confusion and impaired memory.  · Slowed breathing.  · Seizures.  · Loss of consciousness.  DIAGNOSIS   Your health care provider will take a medical history and perform a physical exam. You will be asked about the amount and type of alcohol you have consumed. Blood tests will be done to measure the concentration of alcohol in your blood. In many places, your blood alcohol level must be lower than 80 mg/dL (0.08%) to legally drive. However, many dangerous effects of alcohol can occur at much lower levels.   TREATMENT   People with alcohol intoxication often do not require treatment. Most of the effects of alcohol intoxication are temporary, and they go away as the alcohol naturally " "leaves the body. Your health care provider will monitor your condition until you are stable enough to go home. Fluids are sometimes given through an IV access tube to help prevent dehydration.   HOME CARE INSTRUCTIONS  · Do not drive after drinking alcohol.  · Stay hydrated. Drink enough water and fluids to keep your urine clear or pale yellow. Avoid caffeine.    · Only take over-the-counter or prescription medicines as directed by your health care provider.    SEEK MEDICAL CARE IF:   · You have persistent vomiting.    · You do not feel better after a few days.  · You have frequent alcohol intoxication. Your health care provider can help determine if you should see a substance use treatment counselor.  SEEK IMMEDIATE MEDICAL CARE IF:   · You become shaky or tremble when you try to stop drinking.    · You shake uncontrollably (seizure).    · You throw up (vomit) blood. This may be bright red or may look like black coffee grounds.    · You have blood in your stool. This may be bright red or may appear as a black, tarry, bad smelling stool.    · You become lightheaded or faint.    MAKE SURE YOU:   · Understand these instructions.  · Will watch your condition.  · Will get help right away if you are not doing well or get worse.     This information is not intended to replace advice given to you by your health care provider. Make sure you discuss any questions you have with your health care provider.     Document Released: 09/27/2006 Document Revised: 08/20/2014 Document Reviewed: 05/23/2014  [employer] Interactive Patient Education ©2016 [employer]    Alcohol and Headaches  Alcohol is a chemical known as ethanol. It is found in beverages such as beer, wine and liquor. Greater amounts are often found in liquor such as whiskey, vodka, scotch, mixed drinks and others. These drinks can also contain chemicals called congeners. Both ethanol and the congeners can effect how the person feels after drinking it. These \"after " "effects\" are often referred to as a hangover. Hangovers are rare with moderate alcohol drinking (1 to 3 average drinks). However, hangovers increase when the amount of alcohol consumed is more than moderate.  SYMPTOMS   A hangover can be accompanied by:   · Headache.   · Upset stomach.   · Nausea and vomiting.   · Dehydration.   A hangover is actually a withdrawal state from moderate to heavy alcohol consumption. The recovery process will take longer if you attempt to relieve this withdrawal with more alcohol. Drinking caffeine may relieve some of the fatigue associated with a hangover. However, this can cause more stomach irritation. Caffeine also makes a person urinate more (diuretic) and can worsen dehydration.  TREATMENT   There are a few actions that can reduce a hangover's severity and length.  · Drink 1 to 2 glasses of water (16 to 24 ounces) after you have quit drinking alcohol.   · Use pain medicines carefully and as told by your caregiver. For a headache, avoid acetaminophen. This drug is hard on the liver. Take aspirin instead and drink more water. If aspirin causes more stomach upset, ibuprofen may be a second choice.   · Get rest.   · Avoid high-fat foods and consider eating bananas (restores potassium and magnesium that will help both your stomach and headache). Oranges, apples and pears are good choices too.   · Take vitamins. Alcohol depletes the body's stores of vitamins A, B (especially B6) and C, which can intensify hangover symptoms.   · Drink 16 ounces of water each hour. This will rehydrate your body and make you feel better. Sports drinks containing electrolytes may help your body rehydrate quicker than drinking water alone. The faster you restore proper fluid balance, the sooner you will feel better. Hydration is vital when treating a hangover.   · Exercise. As soon as you feel up to it, sweating helps remove toxins from the body faster.   SEEK MEDICAL CARE IF:   · Hangovers become more " frequent.   · Hangovers interfere with major life activities such as job, family, health and relationships.   · You are unable to control your drinking, professional help may be needed. Contact your physician for a referral to specialized treatment.   SEEK IMMEDIATE MEDICAL CARE IF:   · You throw up blood.   · You pass dark or tarry stools.   · Your headache worsens over the next 24 hours instead getting better.   · Your abdominal or stomach pain worsens instead of getting better over the next 24 hours.   Document Released: 12/20/2004 Document Revised: 03/11/2013 Document Reviewed: 08/05/2009  [employer]® Patient Information ©2013 [employer].

## 2017-11-29 NOTE — ED PROVIDER NOTES
"ED Provider Note    Scribed for Ashleigh Stratton M.D. by Pavel Lyles. 11/28/2017, 10:12 PM.    Primary care provider: Pcp Pt States None  Means of arrival: wheel chair  History obtained from: Patient  History limited by: patient's level of intoxication    CHIEF COMPLAINT  Chief Complaint   Patient presents with   • Alcohol Intoxication     Unknown amount, slurred speech, unable to stand at this time.    • Headache   • N/V       HPI  Ashleigh Marquis is a 57 y.o. female who presents to the Emergency Department for evaluation of a headache. She also endorses abdominal pain and associated neck pain. Patient states tonight \"I drank too much\". The patient admits to daily alcohol use for the last few days. She states she experiences severe withdrawals when she is not drinking. The patient denies falls.     Further history is limited secondary to the patient's level of intoxication.     REVIEW OF SYSTEMS  See HPI for further details.     Further ROS is limited secondary to the patient's level of intoxication.     E.      PAST MEDICAL HISTORY   has a past medical history of Alcoholism (CMS-HCC); Anxiety; Arthritis; ASTHMA; Cancer (CMS-HCC) (1981); Chronic low back pain; Congestive heart failure (CMS-HCC); Depression; EtOH dependence (CMS-HCC); Fall; GERD (gastroesophageal reflux disease); HTN; Hypertension; Indigestion; Muscle disorder; OSTEOPOROSIS; Other specified symptom associated with female genital organs; Psychiatric disorder; PTSD (post-traumatic stress disorder); Renal disorder; Seizure (CMS-HCC); Ulcer (CMS-HCC); and Vitamin D deficiency.    SURGICAL HISTORY   has a past surgical history that includes hernia repair (1977); cysto stent placemnt pre surg (10/7/2010); cystoscopy stent placement (11/9/2010); ureteroscopy (11/9/2010); lasertripsy (11/9/2010); and stent removal (11/9/2010).    SOCIAL HISTORY  Social History   Substance Use Topics   • Smoking status: Current Every Day Smoker     Packs/day: 0.50    " " Years: 20.00     Types: Cigarettes   • Smokeless tobacco: Never Used      Comment: 1/2 pack per day   • Alcohol use Yes      Comment: \"lots of vodka\" currently intoxicated      History   Drug Use No       FAMILY HISTORY  Family History   Problem Relation Age of Onset   • Heart Disease Father    • Hypertension Father    • Diabetes Father    • Cancer Paternal Grandmother    • Depression Other    • Lung Disease Neg Hx    • Stroke Neg Hx        CURRENT MEDICATIONS  Reviewed. See Encounter Summary.     ALLERGIES  Allergies   Allergen Reactions   • Sulfa Drugs Vomiting   • Toradol Vomiting   • Neurontin [Gabapentin]      Bowel incontinence         PHYSICAL EXAM  VITAL SIGNS: /76   Pulse 72   Temp 36.6 °C (97.9 °F)   Resp 18   Ht 1.651 m (5' 5\")   Wt 81.6 kg (180 lb)   SpO2 93%   BMI 29.95 kg/m²    Pulse ox interpretation: I interpret this pulse ox as normal.  Constitutional: Alert in no apparent distress.   HENT: No signs of trauma, Bilateral external ears normal, Nose normal.   Eyes: PERR, Conjunctiva normal, Non-icteric.   Neck: Normal range of motion, No tenderness, Supple, No stridor.   Lymphatic: No lymphadenopathy noted.   Cardiovascular: Regular rate and rhythm, no murmurs.   Thorax & Lungs: Normal breath sounds, No respiratory distress, No wheezing, No chest tenderness.   Abdomen: Bowel sounds normal, Soft, No tenderness, No pulsatile masses. No peritoneal signs.  Skin: Warm, Dry, No erythema, No rash.   Back: No bony tenderness, No CVA tenderness.   Extremities: Intact distal pulses, No edema, No tenderness, No cyanosis  Musculoskeletal: Good range of motion in all major joints. No tenderness to palpation or major deformities noted.   Neurologic: Alert and oriented to place, but focused on food and difficult, No focal deficits noted. Mildly slurred speech.       DIFFERENTIAL DIAGNOSIS AND WORK UP PLAN    10:12 PM Patient seen and examined at bedside. Patient will be treated with 1000 mL NS infusion, " "650 mg Tylenol for her symptoms. The patient is treated with IV fluids secondary to headache. Patient will be monitored in the ED. If patient is able to ambulate and is clinically sober, she will be discharged.       COURSE & MEDICAL DECISION MAKING  Pertinent Labs & Imaging studies reviewed. (See chart for details)    1:24 AM - The patient was able to ambulate to the bathroom without any assistance. However, patient is complaining of some nausea. She will be treated with 4 mg Zofran and discharged. Patient understood and verbalized agreement.     /69   Pulse 81   Temp 36.6 °C (97.9 °F)   Resp 16   Ht 1.651 m (5' 5\")   Wt 81.6 kg (180 lb)   SpO2 92%   BMI 29.95 kg/m²      This is a 57 y.o. year old female who presents with alcohol intoxication ambulated without assistance and history of alcohol use. Patient had no evidence of trauma on her discharge she was alert and talking and felt comfortable going home.    The patient will return for new or worsening symptoms and is stable at the time of discharge.    The patient is referred to a primary physician for blood pressure management, diabetic screening, and for all other preventative health concerns.    DISPOSITION:  Patient will be discharged home in stable condition.    FOLLOW UP:  [facility], Emergency Dept  [address] [facility] [address] [phone]    If symptoms worsen        FINAL IMPRESSION  1. Alcoholic intoxication without complication (CMS-HCC)    2. Chronic alcohol abuse          Pavel GAN (Cayetano), am scribing for, and in the presence of, Ashleigh Stratton M.D..    Electronically signed by: Pavel Lyles (Cayetano), 11/28/2017    Ashleigh GAN M.D. personally performed the services described in this documentation, as scribed by Pavel Lyles in my presence, and it is both accurate and complete.    The note accurately reflects work and decisions made by me.  Ashleigh Stratton  11/29/2017  5:41 " AM    This dictation has been created using voice recognition software and/or scribes. The accuracy of the dictation is limited by the abilities of the software and the expertise of the scribes. I expect there may be some errors of grammar and possibly content. I made every attempt to manually correct the errors within my dictation. However, errors related to voice recognition software and/or scribes may still exist and should be interpreted within the appropriate context.

## 2017-11-29 NOTE — ED NOTES
"Patient wheeled to room. Placed on monitor. Patient states she \"has a severe headache and the room is spinning around. Patient A+Ox2. Disorientated to time and event.   "

## 2017-11-30 ENCOUNTER — HOSPITAL ENCOUNTER (EMERGENCY)
Dept: HOSPITAL 8 - ED | Age: 57
LOS: 1 days | Discharge: HOME | End: 2017-12-01
Payer: MEDICAID

## 2017-11-30 ENCOUNTER — HOSPITAL ENCOUNTER (EMERGENCY)
Dept: HOSPITAL 8 - ED | Age: 57
Discharge: HOME | End: 2017-11-30
Payer: MEDICAID

## 2017-11-30 ENCOUNTER — PATIENT OUTREACH (OUTPATIENT)
Dept: HEALTH INFORMATION MANAGEMENT | Facility: OTHER | Age: 57
End: 2017-11-30

## 2017-11-30 VITALS
DIASTOLIC BLOOD PRESSURE: 67 MMHG | HEART RATE: 78 BPM | BODY MASS INDEX: 29.99 KG/M2 | TEMPERATURE: 96.8 F | WEIGHT: 180 LBS | OXYGEN SATURATION: 100 % | RESPIRATION RATE: 16 BRPM | HEIGHT: 65 IN | SYSTOLIC BLOOD PRESSURE: 116 MMHG

## 2017-11-30 VITALS — SYSTOLIC BLOOD PRESSURE: 120 MMHG | DIASTOLIC BLOOD PRESSURE: 76 MMHG

## 2017-11-30 VITALS — WEIGHT: 180.38 LBS | HEIGHT: 65 IN | BODY MASS INDEX: 30.05 KG/M2

## 2017-11-30 DIAGNOSIS — F10.120: Primary | ICD-10-CM
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
## 2017-11-30 DIAGNOSIS — K21.9: ICD-10-CM

## 2017-11-30 DIAGNOSIS — I10: ICD-10-CM

## 2017-11-30 DIAGNOSIS — F32.9: ICD-10-CM
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
## 2017-11-30 DIAGNOSIS — I95.9: ICD-10-CM

## 2017-11-30 DIAGNOSIS — F10.220: Primary | ICD-10-CM

## 2017-11-30 DIAGNOSIS — F43.10: ICD-10-CM

## 2017-11-30 DIAGNOSIS — S30.816A: ICD-10-CM

## 2017-11-30 DIAGNOSIS — T74.21XA: ICD-10-CM

## 2017-11-30 LAB
C TRACH DNA SPEC QL NAA+PROBE: NEGATIVE
N GONORRHOEA DNA SPEC QL NAA+PROBE: NEGATIVE
SPECIMEN SOURCE: NORMAL

## 2017-11-30 PROCEDURE — 99283 EMERGENCY DEPT VISIT LOW MDM: CPT
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
## 2017-11-30 NOTE — ED NOTES
Patient updated that she is awaiting [place] PD officer to call back. Patient is dressed and doesn't remember anymore information then before.
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
## 2017-11-30 NOTE — DISCHARGE INSTRUCTIONS
"Alcohol and Headaches  Alcohol is a chemical known as ethanol. It is found in beverages such as beer, wine and liquor. Greater amounts are often found in liquor such as whiskey, vodka, scotch, mixed drinks and others. These drinks can also contain chemicals called congeners. Both ethanol and the congeners can effect how the person feels after drinking it. These \"after effects\" are often referred to as a hangover. Hangovers are rare with moderate alcohol drinking (1 to 3 average drinks). However, hangovers increase when the amount of alcohol consumed is more than moderate.  SYMPTOMS   A hangover can be accompanied by:   · Headache.   · Upset stomach.   · Nausea and vomiting.   · Dehydration.   A hangover is actually a withdrawal state from moderate to heavy alcohol consumption. The recovery process will take longer if you attempt to relieve this withdrawal with more alcohol. Drinking caffeine may relieve some of the fatigue associated with a hangover. However, this can cause more stomach irritation. Caffeine also makes a person urinate more (diuretic) and can worsen dehydration.  TREATMENT   There are a few actions that can reduce a hangover's severity and length.  · Drink 1 to 2 glasses of water (16 to 24 ounces) after you have quit drinking alcohol.   · Use pain medicines carefully and as told by your caregiver. For a headache, avoid acetaminophen. This drug is hard on the liver. Take aspirin instead and drink more water. If aspirin causes more stomach upset, ibuprofen may be a second choice.   · Get rest.   · Avoid high-fat foods and consider eating bananas (restores potassium and magnesium that will help both your stomach and headache). Oranges, apples and pears are good choices too.   · Take vitamins. Alcohol depletes the body's stores of vitamins A, B (especially B6) and C, which can intensify hangover symptoms.   · Drink 16 ounces of water each hour. This will rehydrate your body and make you feel better. " Sports drinks containing electrolytes may help your body rehydrate quicker than drinking water alone. The faster you restore proper fluid balance, the sooner you will feel better. Hydration is vital when treating a hangover.   · Exercise. As soon as you feel up to it, sweating helps remove toxins from the body faster.   SEEK MEDICAL CARE IF:   · Hangovers become more frequent.   · Hangovers interfere with major life activities such as job, family, health and relationships.   · You are unable to control your drinking, professional help may be needed. Contact your physician for a referral to specialized treatment.   SEEK IMMEDIATE MEDICAL CARE IF:   · You throw up blood.   · You pass dark or tarry stools.   · Your headache worsens over the next 24 hours instead getting better.   · Your abdominal or stomach pain worsens instead of getting better over the next 24 hours.   Document Released: 12/20/2004 Document Revised: 03/11/2013 Document Reviewed: 08/05/2009  [employer]® Patient Information ©2013 [employer].Sexual Assault or Rape  Sexual assault is any sexual activity that a person is forced, threatened, or coerced into participating in. It may or may not involve physical contact. You are being sexually abused if you are forced to have sexual contact of any kind. Sexual assault is called rape if penetration has occurred (vaginal, oral, or anal). Many times, sexual assaults are committed by a friend, relative, or associate. Sexual assault and rape are never the victim's fault.   Sexual assault can result in various health problems for the person who was assaulted. Some of these problems include:  · Physical injuries in the genital area or other areas of the body.  · Risk of unwanted pregnancy.  · Risk of sexually transmitted infections (STIs).  · Psychological problems such as anxiety, depression, or posttraumatic stress disorder.  WHAT STEPS SHOULD BE TAKEN AFTER A SEXUAL ASSAULT?  If you have been sexually assaulted,  you should take the following steps as soon as possible:  · Go to a safe area as quickly as possible and call your local emergency services (911 in [place] Get away from the area where you have been attacked.    · Do not wash, shower, comb your hair, or clean any part of your body.    · Do not change your clothes.    · Do not remove or touch anything in the area where you were assaulted.    · Go to an emergency room for a complete physical exam. Get the necessary tests to protect yourself from STIs or pregnancy. You may be treated for an STI even if no signs of one are present. Emergency contraceptive medicines are also available to help prevent pregnancy, if this is desired. You may need to be examined by a specially trained health care provider.  · Have the health care provider collect evidence during the exam, even if you are not sure if you will file a report with the police.  · Find out how to file the correct papers with the authorities. This is important for all assaults, even if they were committed by a family member or friend.  · Find out where you can get additional help and support, such as a local rape crisis center.  · Follow up with your health care provider as directed.    HOW CAN YOU REDUCE THE CHANCES OF SEXUAL ASSAULT?  Take the following steps to help reduce your chances of being sexually assaulted:  · Consider carrying mace or pepper spray for protection against an attacker.    · Consider taking a self-defense course.  · Do not try to fight off an attacker if he or she has a gun or knife.    · Be aware of your surroundings, what is happening around you, and who might be there.    · Be assertive, trust your instincts, and walk with confidence and direction.  · Be careful not to drink too much alcohol or use other intoxicants. These can reduce your ability to fight off an assault.  · Always lock your doors and windows. Be sure to have high-quality locks for your home.    · Do not let people enter  "your house if you do not know them.    · Get a home security system that has a siren if you are able.    · Protect the keys to your house and car. Do not lend them out. Do not put your name and address on them. If you lose them, get your locks changed.    · Always lock your car and have your key ready to open the door before approaching the car.    · Park in a well-lit and busy area.  · Plan your driving routes so that you travel on well-lit and frequently used streets.   · Keep your car serviced. Always have at least half a tank of gas in it.    · Do not go into isolated areas alone. This includes open garages, empty buildings or offices, or public laundry rooms.    · Do not walk or jog alone, especially when it is dark.    · Never hitchhike.    · If your car breaks down, call the police for help on your cell phone and stay inside the car with your doors locked and windows up.    · If you are being followed, go to a busy area and call for help.    · If you are stopped by a , especially one in an unmarked police car, keep your door locked. Do not put your window down all the way. Ask the officer to show you identification first.    · Be aware of \"date rape drugs\" that can be placed in a drink when you are not looking. These drugs can make you unable to fight off an assault.  FOR MORE INFORMATION  · Office on Women's Health, [place] Department of Health and Human Services: www.womenshealth.gov/violence-against-women/types-of-violence/sexual-assault-and-abuse.html  · National Sexual Assault Hotline: 4-246-374-HOPE (8773)  · National Domestic Violence Hotline: 9-080-734-SAFE (7233) or www.thehoOvertone.org     This information is not intended to replace advice given to you by your health care provider. Make sure you discuss any questions you have with your health care provider.     Document Released: 12/15/2001 Document Revised: 08/20/2014 Document Reviewed: 05/21/2014  Elsevier Interactive Patient Education " ©2016 [employer]

## 2017-11-30 NOTE — ED NOTES
Per [place] [employer] patient can be discharged. Patient given discharge paperwork and verbalized understanding. Patient out of ED ambulatory with herself. Patient in stable condition and vitals stable and no distress noted.
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
## 2017-11-30 NOTE — ED NOTES
Patient awakened and ambulated around room with a slow and steady gait. Patient asked for clothing and is getting dressed at this time.
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
## 2017-12-01 ENCOUNTER — HOSPITAL ENCOUNTER (INPATIENT)
Dept: HOSPITAL 8 - ED | Age: 57
LOS: 5 days | Discharge: HOME | DRG: 897 | End: 2017-12-06
Attending: HOSPITALIST | Admitting: HOSPITALIST
Payer: MEDICAID

## 2017-12-01 VITALS — DIASTOLIC BLOOD PRESSURE: 71 MMHG | SYSTOLIC BLOOD PRESSURE: 109 MMHG

## 2017-12-01 VITALS — DIASTOLIC BLOOD PRESSURE: 79 MMHG | SYSTOLIC BLOOD PRESSURE: 132 MMHG

## 2017-12-01 VITALS — WEIGHT: 166.23 LBS | HEIGHT: 65 IN | BODY MASS INDEX: 27.7 KG/M2

## 2017-12-01 DIAGNOSIS — E87.2: ICD-10-CM

## 2017-12-01 DIAGNOSIS — E83.51: ICD-10-CM

## 2017-12-01 DIAGNOSIS — F10.239: Primary | ICD-10-CM

## 2017-12-01 DIAGNOSIS — Z88.8: ICD-10-CM

## 2017-12-01 DIAGNOSIS — E46: ICD-10-CM

## 2017-12-01 DIAGNOSIS — E87.6: ICD-10-CM

## 2017-12-01 DIAGNOSIS — F10.229: ICD-10-CM

## 2017-12-01 DIAGNOSIS — Z88.1: ICD-10-CM

## 2017-12-01 DIAGNOSIS — E83.42: ICD-10-CM

## 2017-12-01 DIAGNOSIS — K21.9: ICD-10-CM

## 2017-12-01 DIAGNOSIS — H10.9: ICD-10-CM

## 2017-12-01 DIAGNOSIS — K76.0: ICD-10-CM

## 2017-12-01 DIAGNOSIS — Z59.0: ICD-10-CM

## 2017-12-01 DIAGNOSIS — F43.10: ICD-10-CM

## 2017-12-01 DIAGNOSIS — Z88.2: ICD-10-CM

## 2017-12-01 LAB
AST SERPL-CCNC: 96 U/L (ref 15–37)
BUN SERPL-MCNC: 16 MG/DL (ref 7–18)
HCT VFR BLD CALC: 39.1 % (ref 34.6–47.8)
HGB BLD-MCNC: 13.1 G/DL (ref 11.7–16.4)
WBC # BLD AUTO: 8.3 X10^3/UL (ref 3.4–10)

## 2017-12-01 PROCEDURE — 82962 GLUCOSE BLOOD TEST: CPT

## 2017-12-01 PROCEDURE — 84100 ASSAY OF PHOSPHORUS: CPT

## 2017-12-01 PROCEDURE — 87324 CLOSTRIDIUM AG IA: CPT

## 2017-12-01 PROCEDURE — 96360 HYDRATION IV INFUSION INIT: CPT

## 2017-12-01 PROCEDURE — 82040 ASSAY OF SERUM ALBUMIN: CPT

## 2017-12-01 PROCEDURE — 85025 COMPLETE CBC W/AUTO DIFF WBC: CPT

## 2017-12-01 PROCEDURE — 80307 DRUG TEST PRSMV CHEM ANLYZR: CPT

## 2017-12-01 PROCEDURE — 89055 LEUKOCYTE ASSESSMENT FECAL: CPT

## 2017-12-01 PROCEDURE — 83735 ASSAY OF MAGNESIUM: CPT

## 2017-12-01 PROCEDURE — 80048 BASIC METABOLIC PNL TOTAL CA: CPT

## 2017-12-01 PROCEDURE — 96361 HYDRATE IV INFUSION ADD-ON: CPT

## 2017-12-01 PROCEDURE — 83605 ASSAY OF LACTIC ACID: CPT

## 2017-12-01 PROCEDURE — 36415 COLL VENOUS BLD VENIPUNCTURE: CPT

## 2017-12-01 PROCEDURE — 84132 ASSAY OF SERUM POTASSIUM: CPT

## 2017-12-01 PROCEDURE — 80053 COMPREHEN METABOLIC PANEL: CPT

## 2017-12-01 PROCEDURE — G0479 DRUG TEST PRESUMP NOT OPT: HCPCS

## 2017-12-01 PROCEDURE — 76700 US EXAM ABDOM COMPLETE: CPT

## 2017-12-01 RX ADMIN — LORAZEPAM PRN MG: 2 INJECTION INTRAMUSCULAR; INTRAVENOUS at 17:56

## 2017-12-01 RX ADMIN — POTASSIUM CHLORIDE SCH MEQ: 20 TABLET, EXTENDED RELEASE ORAL at 17:52

## 2017-12-01 RX ADMIN — HEPARIN SODIUM SCH UNITS: 5000 INJECTION, SOLUTION INTRAVENOUS; SUBCUTANEOUS at 17:52

## 2017-12-01 RX ADMIN — LORAZEPAM PRN MG: 2 INJECTION INTRAMUSCULAR; INTRAVENOUS at 20:14

## 2017-12-01 RX ADMIN — LORAZEPAM PRN MG: 2 INJECTION INTRAMUSCULAR; INTRAVENOUS at 22:31

## 2017-12-01 SDOH — ECONOMIC STABILITY - HOUSING INSECURITY: HOMELESSNESS: Z59.0

## 2017-12-02 VITALS — SYSTOLIC BLOOD PRESSURE: 134 MMHG | DIASTOLIC BLOOD PRESSURE: 81 MMHG

## 2017-12-02 VITALS — SYSTOLIC BLOOD PRESSURE: 134 MMHG | DIASTOLIC BLOOD PRESSURE: 84 MMHG

## 2017-12-02 VITALS — SYSTOLIC BLOOD PRESSURE: 180 MMHG | DIASTOLIC BLOOD PRESSURE: 92 MMHG

## 2017-12-02 VITALS — DIASTOLIC BLOOD PRESSURE: 74 MMHG | SYSTOLIC BLOOD PRESSURE: 132 MMHG

## 2017-12-02 LAB
AST SERPL-CCNC: 85 U/L (ref 15–37)
BUN SERPL-MCNC: 8 MG/DL (ref 7–18)
HCT VFR BLD CALC: 35 % (ref 34.6–47.8)
HGB BLD-MCNC: 11.9 G/DL (ref 11.7–16.4)
WBC # BLD AUTO: 6.3 X10^3/UL (ref 3.4–10)

## 2017-12-02 RX ADMIN — LORAZEPAM PRN MG: 2 INJECTION INTRAMUSCULAR; INTRAVENOUS at 10:16

## 2017-12-02 RX ADMIN — ONDANSETRON PRN MG: 2 INJECTION, SOLUTION INTRAMUSCULAR; INTRAVENOUS at 00:53

## 2017-12-02 RX ADMIN — GLYCERIN PRN DROP: .002; .002; .01 SOLUTION/ DROPS OPHTHALMIC at 20:47

## 2017-12-02 RX ADMIN — POTASSIUM CHLORIDE SCH MEQ: 20 TABLET, EXTENDED RELEASE ORAL at 17:10

## 2017-12-02 RX ADMIN — VALPROIC ACID SCH MG: 250 CAPSULE, LIQUID FILLED ORAL at 16:00

## 2017-12-02 RX ADMIN — ONDANSETRON PRN MG: 2 INJECTION, SOLUTION INTRAMUSCULAR; INTRAVENOUS at 18:30

## 2017-12-02 RX ADMIN — GLYCERIN PRN DROP: .002; .002; .01 SOLUTION/ DROPS OPHTHALMIC at 14:17

## 2017-12-02 RX ADMIN — ACETAMINOPHEN, ASPIRIN AND CAFFEINE PRN TAB: 250; 250; 65 TABLET, FILM COATED ORAL at 20:36

## 2017-12-02 RX ADMIN — VALPROIC ACID SCH MG: 250 CAPSULE, LIQUID FILLED ORAL at 20:36

## 2017-12-02 RX ADMIN — LORAZEPAM PRN MG: 2 INJECTION INTRAMUSCULAR; INTRAVENOUS at 00:53

## 2017-12-02 RX ADMIN — HEPARIN SODIUM SCH UNITS: 5000 INJECTION, SOLUTION INTRAVENOUS; SUBCUTANEOUS at 00:52

## 2017-12-02 RX ADMIN — HEPARIN SODIUM SCH UNITS: 5000 INJECTION, SOLUTION INTRAVENOUS; SUBCUTANEOUS at 08:02

## 2017-12-02 RX ADMIN — CHLORDIAZEPOXIDE HYDROCHLORIDE PRN MG: 25 CAPSULE ORAL at 20:37

## 2017-12-02 RX ADMIN — HEPARIN SODIUM SCH UNITS: 5000 INJECTION, SOLUTION INTRAVENOUS; SUBCUTANEOUS at 17:10

## 2017-12-02 RX ADMIN — VALPROIC ACID SCH MG: 250 CAPSULE, LIQUID FILLED ORAL at 13:16

## 2017-12-02 RX ADMIN — ACETAMINOPHEN, ASPIRIN AND CAFFEINE PRN TAB: 250; 250; 65 TABLET, FILM COATED ORAL at 14:17

## 2017-12-02 RX ADMIN — GLYCERIN PRN DROP: .002; .002; .01 SOLUTION/ DROPS OPHTHALMIC at 17:17

## 2017-12-02 RX ADMIN — LORAZEPAM PRN MG: 2 INJECTION INTRAMUSCULAR; INTRAVENOUS at 07:57

## 2017-12-02 RX ADMIN — LORAZEPAM PRN MG: 2 INJECTION INTRAMUSCULAR; INTRAVENOUS at 18:30

## 2017-12-02 RX ADMIN — CHLORDIAZEPOXIDE HYDROCHLORIDE PRN MG: 25 CAPSULE ORAL at 14:17

## 2017-12-02 RX ADMIN — LORAZEPAM PRN MG: 2 INJECTION INTRAMUSCULAR; INTRAVENOUS at 06:04

## 2017-12-02 RX ADMIN — POTASSIUM CHLORIDE SCH MEQ: 20 TABLET, EXTENDED RELEASE ORAL at 08:02

## 2017-12-03 VITALS — DIASTOLIC BLOOD PRESSURE: 82 MMHG | SYSTOLIC BLOOD PRESSURE: 135 MMHG

## 2017-12-03 VITALS — SYSTOLIC BLOOD PRESSURE: 119 MMHG | DIASTOLIC BLOOD PRESSURE: 82 MMHG

## 2017-12-03 VITALS — DIASTOLIC BLOOD PRESSURE: 64 MMHG | SYSTOLIC BLOOD PRESSURE: 97 MMHG

## 2017-12-03 VITALS — DIASTOLIC BLOOD PRESSURE: 74 MMHG | SYSTOLIC BLOOD PRESSURE: 124 MMHG

## 2017-12-03 LAB — BUN SERPL-MCNC: 12 MG/DL (ref 7–18)

## 2017-12-03 RX ADMIN — ACETAMINOPHEN, ASPIRIN AND CAFFEINE PRN TAB: 250; 250; 65 TABLET, FILM COATED ORAL at 16:32

## 2017-12-03 RX ADMIN — VALPROIC ACID SCH MG: 250 CAPSULE, LIQUID FILLED ORAL at 19:59

## 2017-12-03 RX ADMIN — VALPROIC ACID SCH MG: 250 CAPSULE, LIQUID FILLED ORAL at 08:18

## 2017-12-03 RX ADMIN — VALPROIC ACID SCH MG: 250 CAPSULE, LIQUID FILLED ORAL at 16:32

## 2017-12-03 RX ADMIN — CHLORDIAZEPOXIDE HYDROCHLORIDE PRN MG: 25 CAPSULE ORAL at 17:08

## 2017-12-03 RX ADMIN — LORAZEPAM PRN MG: 2 INJECTION INTRAMUSCULAR; INTRAVENOUS at 00:22

## 2017-12-03 RX ADMIN — LORAZEPAM PRN MG: 2 INJECTION INTRAMUSCULAR; INTRAVENOUS at 06:45

## 2017-12-03 RX ADMIN — POTASSIUM CHLORIDE SCH MEQ: 20 TABLET, EXTENDED RELEASE ORAL at 16:32

## 2017-12-03 RX ADMIN — ACETAMINOPHEN, ASPIRIN AND CAFFEINE PRN TAB: 250; 250; 65 TABLET, FILM COATED ORAL at 09:14

## 2017-12-03 RX ADMIN — GLYCERIN PRN DROP: .002; .002; .01 SOLUTION/ DROPS OPHTHALMIC at 09:14

## 2017-12-03 RX ADMIN — HEPARIN SODIUM SCH UNITS: 5000 INJECTION, SOLUTION INTRAVENOUS; SUBCUTANEOUS at 08:18

## 2017-12-03 RX ADMIN — POTASSIUM CHLORIDE SCH MEQ: 20 TABLET, EXTENDED RELEASE ORAL at 08:18

## 2017-12-03 RX ADMIN — HEPARIN SODIUM SCH UNITS: 5000 INJECTION, SOLUTION INTRAVENOUS; SUBCUTANEOUS at 16:32

## 2017-12-03 RX ADMIN — CHLORDIAZEPOXIDE HYDROCHLORIDE PRN MG: 25 CAPSULE ORAL at 03:39

## 2017-12-03 RX ADMIN — HEPARIN SODIUM SCH UNITS: 5000 INJECTION, SOLUTION INTRAVENOUS; SUBCUTANEOUS at 00:23

## 2017-12-03 RX ADMIN — ONDANSETRON PRN MG: 2 INJECTION, SOLUTION INTRAMUSCULAR; INTRAVENOUS at 15:05

## 2017-12-03 RX ADMIN — OMEPRAZOLE SCH MG: 20 CAPSULE, DELAYED RELEASE ORAL at 08:18

## 2017-12-03 RX ADMIN — CHLORDIAZEPOXIDE HYDROCHLORIDE PRN MG: 25 CAPSULE ORAL at 10:42

## 2017-12-03 RX ADMIN — ACETAMINOPHEN, ASPIRIN AND CAFFEINE PRN TAB: 250; 250; 65 TABLET, FILM COATED ORAL at 03:38

## 2017-12-03 RX ADMIN — GLYCERIN PRN DROP: .002; .002; .01 SOLUTION/ DROPS OPHTHALMIC at 19:59

## 2017-12-04 VITALS — SYSTOLIC BLOOD PRESSURE: 107 MMHG | DIASTOLIC BLOOD PRESSURE: 71 MMHG

## 2017-12-04 VITALS — DIASTOLIC BLOOD PRESSURE: 80 MMHG | SYSTOLIC BLOOD PRESSURE: 121 MMHG

## 2017-12-04 VITALS — DIASTOLIC BLOOD PRESSURE: 65 MMHG | SYSTOLIC BLOOD PRESSURE: 100 MMHG

## 2017-12-04 VITALS — SYSTOLIC BLOOD PRESSURE: 99 MMHG | DIASTOLIC BLOOD PRESSURE: 66 MMHG

## 2017-12-04 VITALS — SYSTOLIC BLOOD PRESSURE: 104 MMHG | DIASTOLIC BLOOD PRESSURE: 65 MMHG

## 2017-12-04 RX ADMIN — HEPARIN SODIUM SCH UNITS: 5000 INJECTION, SOLUTION INTRAVENOUS; SUBCUTANEOUS at 23:18

## 2017-12-04 RX ADMIN — ERYTHROMYCIN SCH APPLIC: 5 OINTMENT OPHTHALMIC at 15:52

## 2017-12-04 RX ADMIN — ERYTHROMYCIN SCH APPLIC: 5 OINTMENT OPHTHALMIC at 12:05

## 2017-12-04 RX ADMIN — POTASSIUM CHLORIDE SCH MEQ: 20 TABLET, EXTENDED RELEASE ORAL at 07:54

## 2017-12-04 RX ADMIN — POTASSIUM CHLORIDE SCH MEQ: 20 TABLET, EXTENDED RELEASE ORAL at 17:55

## 2017-12-04 RX ADMIN — VALPROIC ACID SCH MG: 250 CAPSULE, LIQUID FILLED ORAL at 07:54

## 2017-12-04 RX ADMIN — CHLORDIAZEPOXIDE HYDROCHLORIDE PRN MG: 25 CAPSULE ORAL at 00:01

## 2017-12-04 RX ADMIN — ACETAMINOPHEN, ASPIRIN AND CAFFEINE PRN TAB: 250; 250; 65 TABLET, FILM COATED ORAL at 15:52

## 2017-12-04 RX ADMIN — CHLORDIAZEPOXIDE HYDROCHLORIDE PRN MG: 25 CAPSULE ORAL at 09:38

## 2017-12-04 RX ADMIN — CHLORDIAZEPOXIDE HYDROCHLORIDE PRN MG: 25 CAPSULE ORAL at 23:18

## 2017-12-04 RX ADMIN — HEPARIN SODIUM SCH UNITS: 5000 INJECTION, SOLUTION INTRAVENOUS; SUBCUTANEOUS at 15:52

## 2017-12-04 RX ADMIN — ERYTHROMYCIN SCH APPLIC: 5 OINTMENT OPHTHALMIC at 20:33

## 2017-12-04 RX ADMIN — HEPARIN SODIUM SCH UNITS: 5000 INJECTION, SOLUTION INTRAVENOUS; SUBCUTANEOUS at 00:02

## 2017-12-04 RX ADMIN — GLYCERIN PRN DROP: .002; .002; .01 SOLUTION/ DROPS OPHTHALMIC at 03:58

## 2017-12-04 RX ADMIN — VALPROIC ACID SCH MG: 250 CAPSULE, LIQUID FILLED ORAL at 15:51

## 2017-12-04 RX ADMIN — GLYCERIN PRN DROP: .002; .002; .01 SOLUTION/ DROPS OPHTHALMIC at 07:54

## 2017-12-04 RX ADMIN — CHLORDIAZEPOXIDE HYDROCHLORIDE PRN MG: 25 CAPSULE ORAL at 18:02

## 2017-12-04 RX ADMIN — OMEPRAZOLE SCH MG: 20 CAPSULE, DELAYED RELEASE ORAL at 07:54

## 2017-12-04 RX ADMIN — ERYTHROMYCIN SCH APPLIC: 5 OINTMENT OPHTHALMIC at 08:30

## 2017-12-04 RX ADMIN — HEPARIN SODIUM SCH UNITS: 5000 INJECTION, SOLUTION INTRAVENOUS; SUBCUTANEOUS at 07:54

## 2017-12-04 RX ADMIN — ACETAMINOPHEN, ASPIRIN AND CAFFEINE PRN TAB: 250; 250; 65 TABLET, FILM COATED ORAL at 07:54

## 2017-12-04 RX ADMIN — VALPROIC ACID SCH MG: 250 CAPSULE, LIQUID FILLED ORAL at 21:49

## 2017-12-05 VITALS — DIASTOLIC BLOOD PRESSURE: 66 MMHG | SYSTOLIC BLOOD PRESSURE: 102 MMHG

## 2017-12-05 VITALS — SYSTOLIC BLOOD PRESSURE: 100 MMHG | DIASTOLIC BLOOD PRESSURE: 63 MMHG

## 2017-12-05 VITALS — DIASTOLIC BLOOD PRESSURE: 67 MMHG | SYSTOLIC BLOOD PRESSURE: 101 MMHG

## 2017-12-05 VITALS — DIASTOLIC BLOOD PRESSURE: 64 MMHG | SYSTOLIC BLOOD PRESSURE: 98 MMHG

## 2017-12-05 LAB
AST SERPL-CCNC: 235 U/L (ref 15–37)
BUN SERPL-MCNC: 13 MG/DL (ref 7–18)

## 2017-12-05 RX ADMIN — Medication SCH MG: at 14:09

## 2017-12-05 RX ADMIN — ERYTHROMYCIN SCH APPLIC: 5 OINTMENT OPHTHALMIC at 02:23

## 2017-12-05 RX ADMIN — ERYTHROMYCIN SCH APPLIC: 5 OINTMENT OPHTHALMIC at 19:04

## 2017-12-05 RX ADMIN — OMEPRAZOLE SCH MG: 20 CAPSULE, DELAYED RELEASE ORAL at 08:15

## 2017-12-05 RX ADMIN — Medication SCH TAB: at 14:09

## 2017-12-05 RX ADMIN — ERYTHROMYCIN SCH APPLIC: 5 OINTMENT OPHTHALMIC at 10:37

## 2017-12-05 RX ADMIN — POTASSIUM CHLORIDE SCH MEQ: 20 TABLET, EXTENDED RELEASE ORAL at 08:15

## 2017-12-05 RX ADMIN — HEPARIN SODIUM SCH UNITS: 5000 INJECTION, SOLUTION INTRAVENOUS; SUBCUTANEOUS at 16:45

## 2017-12-05 RX ADMIN — ERYTHROMYCIN SCH APPLIC: 5 OINTMENT OPHTHALMIC at 14:10

## 2017-12-05 RX ADMIN — ERYTHROMYCIN SCH APPLIC: 5 OINTMENT OPHTHALMIC at 06:12

## 2017-12-05 RX ADMIN — VALPROIC ACID SCH MG: 250 CAPSULE, LIQUID FILLED ORAL at 08:15

## 2017-12-05 RX ADMIN — ERYTHROMYCIN SCH APPLIC: 5 OINTMENT OPHTHALMIC at 22:30

## 2017-12-05 RX ADMIN — FOLIC ACID SCH MG: 1 TABLET ORAL at 14:09

## 2017-12-05 RX ADMIN — HEPARIN SODIUM SCH UNITS: 5000 INJECTION, SOLUTION INTRAVENOUS; SUBCUTANEOUS at 23:49

## 2017-12-05 RX ADMIN — HEPARIN SODIUM SCH UNITS: 5000 INJECTION, SOLUTION INTRAVENOUS; SUBCUTANEOUS at 08:15

## 2017-12-06 VITALS — DIASTOLIC BLOOD PRESSURE: 58 MMHG | SYSTOLIC BLOOD PRESSURE: 114 MMHG

## 2017-12-06 LAB
AST SERPL-CCNC: 156 U/L (ref 15–37)
BUN SERPL-MCNC: 17 MG/DL (ref 7–18)

## 2017-12-06 RX ADMIN — HEPARIN SODIUM SCH UNITS: 5000 INJECTION, SOLUTION INTRAVENOUS; SUBCUTANEOUS at 09:15

## 2017-12-06 RX ADMIN — OMEPRAZOLE SCH MG: 20 CAPSULE, DELAYED RELEASE ORAL at 09:15

## 2017-12-06 RX ADMIN — ACETAMINOPHEN, ASPIRIN AND CAFFEINE PRN TAB: 250; 250; 65 TABLET, FILM COATED ORAL at 06:20

## 2017-12-06 RX ADMIN — ERYTHROMYCIN SCH APPLIC: 5 OINTMENT OPHTHALMIC at 06:07

## 2017-12-06 RX ADMIN — ERYTHROMYCIN SCH APPLIC: 5 OINTMENT OPHTHALMIC at 09:16

## 2017-12-06 RX ADMIN — FOLIC ACID SCH MG: 1 TABLET ORAL at 09:15

## 2017-12-06 RX ADMIN — Medication SCH TAB: at 09:15

## 2017-12-06 RX ADMIN — Medication SCH MG: at 09:16

## 2017-12-06 RX ADMIN — ERYTHROMYCIN SCH APPLIC: 5 OINTMENT OPHTHALMIC at 02:18

## 2017-12-07 ENCOUNTER — HOSPITAL ENCOUNTER (EMERGENCY)
Dept: HOSPITAL 8 - ED | Age: 57
Discharge: HOME | End: 2017-12-07
Payer: MEDICAID

## 2017-12-07 VITALS — BODY MASS INDEX: 22.58 KG/M2 | HEIGHT: 64 IN | WEIGHT: 132.28 LBS

## 2017-12-07 VITALS — DIASTOLIC BLOOD PRESSURE: 79 MMHG | SYSTOLIC BLOOD PRESSURE: 158 MMHG

## 2017-12-07 DIAGNOSIS — F10.220: Primary | ICD-10-CM

## 2017-12-07 PROCEDURE — 99283 EMERGENCY DEPT VISIT LOW MDM: CPT

## 2018-06-18 ENCOUNTER — HOSPITAL ENCOUNTER (EMERGENCY)
Dept: HOSPITAL 8 - ED | Age: 58
LOS: 1 days | Discharge: HOME | End: 2018-06-19
Payer: MEDICAID

## 2018-06-18 VITALS — WEIGHT: 180.36 LBS | HEIGHT: 65 IN | BODY MASS INDEX: 30.05 KG/M2

## 2018-06-18 DIAGNOSIS — F10.229: Primary | ICD-10-CM

## 2018-06-18 DIAGNOSIS — I95.9: ICD-10-CM

## 2018-06-18 DIAGNOSIS — F19.10: ICD-10-CM

## 2018-06-18 DIAGNOSIS — K21.9: ICD-10-CM

## 2018-06-18 DIAGNOSIS — Z79.899: ICD-10-CM

## 2018-06-18 DIAGNOSIS — I10: ICD-10-CM

## 2018-06-18 DIAGNOSIS — F43.10: ICD-10-CM

## 2018-06-18 DIAGNOSIS — D64.9: ICD-10-CM

## 2018-06-18 PROCEDURE — 96360 HYDRATION IV INFUSION INIT: CPT

## 2018-06-18 PROCEDURE — 93005 ELECTROCARDIOGRAM TRACING: CPT

## 2018-06-18 PROCEDURE — 36415 COLL VENOUS BLD VENIPUNCTURE: CPT

## 2018-06-18 PROCEDURE — 85025 COMPLETE CBC W/AUTO DIFF WBC: CPT

## 2018-06-18 PROCEDURE — 99285 EMERGENCY DEPT VISIT HI MDM: CPT

## 2018-06-18 PROCEDURE — 96361 HYDRATE IV INFUSION ADD-ON: CPT

## 2018-06-18 PROCEDURE — 80053 COMPREHEN METABOLIC PANEL: CPT

## 2018-06-18 PROCEDURE — 80307 DRUG TEST PRSMV CHEM ANLYZR: CPT

## 2018-06-19 ENCOUNTER — HOSPITAL ENCOUNTER (EMERGENCY)
Dept: HOSPITAL 8 - ED | Age: 58
Discharge: HOME | End: 2018-06-19
Payer: MEDICAID

## 2018-06-19 VITALS — DIASTOLIC BLOOD PRESSURE: 77 MMHG | SYSTOLIC BLOOD PRESSURE: 113 MMHG

## 2018-06-19 VITALS — DIASTOLIC BLOOD PRESSURE: 63 MMHG | SYSTOLIC BLOOD PRESSURE: 99 MMHG

## 2018-06-19 VITALS — BODY MASS INDEX: 31.99 KG/M2 | WEIGHT: 187.39 LBS | HEIGHT: 64 IN

## 2018-06-19 DIAGNOSIS — F10.120: Primary | ICD-10-CM

## 2018-06-19 DIAGNOSIS — K21.9: ICD-10-CM

## 2018-06-19 DIAGNOSIS — I10: ICD-10-CM

## 2018-06-19 LAB
ALBUMIN SERPL-MCNC: 3.2 G/DL (ref 3.4–5)
ALP SERPL-CCNC: 135 U/L (ref 45–117)
ALT SERPL-CCNC: 28 U/L (ref 12–78)
ANION GAP SERPL CALC-SCNC: 6 MMOL/L (ref 5–15)
BASOPHILS # BLD AUTO: 0.08 X10^3/UL (ref 0–0.1)
BASOPHILS NFR BLD AUTO: 1 % (ref 0–1)
BILIRUB SERPL-MCNC: 0.3 MG/DL (ref 0.2–1)
CALCIUM SERPL-MCNC: 7.8 MG/DL (ref 8.5–10.1)
CHLORIDE SERPL-SCNC: 113 MMOL/L (ref 98–107)
CREAT SERPL-MCNC: 1.45 MG/DL (ref 0.55–1.02)
EOSINOPHIL # BLD AUTO: 0.1 X10^3/UL (ref 0–0.4)
EOSINOPHIL NFR BLD AUTO: 1 % (ref 1–7)
ERYTHROCYTE [DISTWIDTH] IN BLOOD BY AUTOMATED COUNT: 17.4 % (ref 9.6–15.2)
LYMPHOCYTES # BLD AUTO: 2.36 X10^3/UL (ref 1–3.4)
LYMPHOCYTES NFR BLD AUTO: 31 % (ref 22–44)
MCH RBC QN AUTO: 30.7 PG (ref 27–34.8)
MCHC RBC AUTO-ENTMCNC: 33.7 G/DL (ref 32.4–35.8)
MCV RBC AUTO: 91.2 FL (ref 80–100)
MD: NO
MONOCYTES # BLD AUTO: 0.9 X10^3/UL (ref 0.2–0.8)
MONOCYTES NFR BLD AUTO: 12 % (ref 2–9)
NEUTROPHILS # BLD AUTO: 4.28 X10^3/UL (ref 1.8–6.8)
NEUTROPHILS NFR BLD AUTO: 56 % (ref 42–75)
PLATELET # BLD AUTO: 229 X10^3/UL (ref 130–400)
PMV BLD AUTO: 8.3 FL (ref 7.4–10.4)
PROT SERPL-MCNC: 6.8 G/DL (ref 6.4–8.2)
RBC # BLD AUTO: 3.77 X10^6/UL (ref 3.82–5.3)

## 2018-06-19 PROCEDURE — 99283 EMERGENCY DEPT VISIT LOW MDM: CPT

## 2018-06-20 ENCOUNTER — HOSPITAL ENCOUNTER (EMERGENCY)
Facility: MEDICAL CENTER | Age: 58
End: 2018-06-21
Attending: EMERGENCY MEDICINE
Payer: MEDICAID

## 2018-06-20 ENCOUNTER — HOSPITAL ENCOUNTER (EMERGENCY)
Facility: MEDICAL CENTER | Age: 58
End: 2018-06-20
Payer: MEDICAID

## 2018-06-20 ENCOUNTER — HOSPITAL ENCOUNTER (EMERGENCY)
Dept: HOSPITAL 8 - ED | Age: 58
Discharge: HOME | End: 2018-06-20
Payer: MEDICAID

## 2018-06-20 VITALS — HEIGHT: 65 IN | WEIGHT: 293 LBS | BODY MASS INDEX: 48.82 KG/M2

## 2018-06-20 VITALS
SYSTOLIC BLOOD PRESSURE: 163 MMHG | RESPIRATION RATE: 16 BRPM | HEIGHT: 64 IN | HEART RATE: 98 BPM | TEMPERATURE: 96.8 F | DIASTOLIC BLOOD PRESSURE: 96 MMHG | OXYGEN SATURATION: 100 % | WEIGHT: 175.04 LBS | BODY MASS INDEX: 29.88 KG/M2

## 2018-06-20 VITALS — DIASTOLIC BLOOD PRESSURE: 80 MMHG | SYSTOLIC BLOOD PRESSURE: 152 MMHG

## 2018-06-20 DIAGNOSIS — K21.9: ICD-10-CM

## 2018-06-20 DIAGNOSIS — F19.10: ICD-10-CM

## 2018-06-20 DIAGNOSIS — F10.220: Primary | ICD-10-CM

## 2018-06-20 DIAGNOSIS — I10: ICD-10-CM

## 2018-06-20 DIAGNOSIS — Z72.9: ICD-10-CM

## 2018-06-20 DIAGNOSIS — F10.920 ALCOHOLIC INTOXICATION WITHOUT COMPLICATION (HCC): ICD-10-CM

## 2018-06-20 PROCEDURE — 99285 EMERGENCY DEPT VISIT HI MDM: CPT

## 2018-06-20 PROCEDURE — 99283 EMERGENCY DEPT VISIT LOW MDM: CPT

## 2018-06-20 PROCEDURE — 302449 STATCHG TRIAGE ONLY (STATISTIC)

## 2018-06-20 PROCEDURE — 82962 GLUCOSE BLOOD TEST: CPT

## 2018-06-20 ASSESSMENT — PAIN SCALES - GENERAL: PAINLEVEL_OUTOF10: 0

## 2018-06-21 ENCOUNTER — HOSPITAL ENCOUNTER (EMERGENCY)
Facility: MEDICAL CENTER | Age: 58
End: 2018-06-22
Attending: EMERGENCY MEDICINE
Payer: MEDICAID

## 2018-06-21 VITALS
DIASTOLIC BLOOD PRESSURE: 63 MMHG | SYSTOLIC BLOOD PRESSURE: 121 MMHG | WEIGHT: 174 LBS | BODY MASS INDEX: 29.87 KG/M2 | TEMPERATURE: 97.6 F | RESPIRATION RATE: 16 BRPM | HEART RATE: 90 BPM | OXYGEN SATURATION: 94 %

## 2018-06-21 DIAGNOSIS — F10.920 ACUTE ALCOHOLIC INTOXICATION WITHOUT COMPLICATION (HCC): ICD-10-CM

## 2018-06-21 LAB
AMPHET UR QL SCN: NEGATIVE
BARBITURATES UR QL SCN: NEGATIVE
BENZODIAZ UR QL SCN: POSITIVE
BZE UR QL SCN: NEGATIVE
CANNABINOIDS UR QL SCN: NEGATIVE
GLUCOSE BLD-MCNC: 78 MG/DL (ref 65–99)
METHADONE UR QL SCN: NEGATIVE
OPIATES UR QL SCN: NEGATIVE
OXYCODONE UR QL SCN: NEGATIVE
PCP UR QL SCN: NEGATIVE
POC BREATHALIZER: 0.14 PERCENT (ref 0–0.01)
POC BREATHALIZER: 0.18 PERCENT (ref 0–0.01)
POC BREATHALIZER: 0.36 PERCENT (ref 0–0.01)
PROPOXYPH UR QL SCN: NEGATIVE

## 2018-06-21 PROCEDURE — A9270 NON-COVERED ITEM OR SERVICE: HCPCS | Performed by: EMERGENCY MEDICINE

## 2018-06-21 PROCEDURE — 700102 HCHG RX REV CODE 250 W/ 637 OVERRIDE(OP): Performed by: EMERGENCY MEDICINE

## 2018-06-21 PROCEDURE — 99285 EMERGENCY DEPT VISIT HI MDM: CPT

## 2018-06-21 PROCEDURE — 80307 DRUG TEST PRSMV CHEM ANLYZR: CPT

## 2018-06-21 PROCEDURE — 302970 POC BREATHALIZER: Performed by: EMERGENCY MEDICINE

## 2018-06-21 RX ORDER — IBUPROFEN 200 MG
400 TABLET ORAL EVERY 6 HOURS PRN
Status: DISCONTINUED | OUTPATIENT
Start: 2018-06-21 | End: 2018-06-22 | Stop reason: HOSPADM

## 2018-06-21 RX ORDER — IBUPROFEN 600 MG/1
600 TABLET ORAL ONCE
Status: COMPLETED | OUTPATIENT
Start: 2018-06-21 | End: 2018-06-21

## 2018-06-21 RX ADMIN — IBUPROFEN 600 MG: 600 TABLET, FILM COATED ORAL at 08:16

## 2018-06-21 RX ADMIN — IBUPROFEN 400 MG: 200 TABLET, FILM COATED ORAL at 20:32
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
## 2018-06-21 NOTE — ED NOTES
Pt c/o that her throat hurts but is requesting food. Informed pt that ERP would need to assess her before food can be admin. Pt states she just wanted food and water because all she has done today is drink. Water given per request.

## 2018-06-21 NOTE — ED NOTES
[place] fall assessment completed. Pt is High risk for fall. Interventions complete. Pt placed in yellow wrist band placed, green sign on wall. Bed locked in low position. Personal possessions in place. Personal needs assessed. Safety assessed. Will monitor frequently.

## 2018-06-21 NOTE — ED NOTES
.Discharge instructions given to patient; patient verbalized understanding. Vital signs WNL prior to discharge. Pt ambulatory with steady gait upon leaving ED stating she will take a cab home.
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
## 2018-06-21 NOTE — ED NOTES
Pt resting on gurney with eyes closed, wakes to verbal. VSS. Pt calm and cooperative at this time.

## 2018-06-21 NOTE — ED NOTES
Patient given water/crackers; sitting up and eating at this time. Patient requesting medication for headache; ERP notified.

## 2018-06-21 NOTE — DISCHARGE INSTRUCTIONS
Alcohol Problems  Most adults who drink alcohol drink in moderation (not a lot) are at low risk for developing problems related to their drinking. However, all drinkers, including low-risk drinkers, should know about the health risks connected with drinking alcohol.  RECOMMENDATIONS FOR LOW-RISK DRINKING   Drink in moderation. Moderate drinking is defined as follows:   · Men - no more than 2 drinks per day.  · Nonpregnant women - no more than 1 drink per day.  · Over age 65 - no more than 1 drink per day.  A standard drink is 12 grams of pure alcohol, which is equal to a 12 ounce bottle of beer or wine cooler, a 5 ounce glass of wine, or 1.5 ounces of distilled spirits (such as whiskey, gama, vodka, or rum).   ABSTAIN FROM (DO NOT DRINK) ALCOHOL:  · When pregnant or considering pregnancy.  · When taking a medication that interacts with alcohol.  · If you are alcohol dependent.  · A medical condition that prohibits drinking alcohol (such as ulcer, liver disease, or heart disease).  DISCUSS WITH YOUR CAREGIVER:  · If you are at risk for coronary heart disease, discuss the potential benefits and risks of alcohol use: Light to moderate drinking is associated with lower rates of coronary heart disease in certain populations (for example, men over age 45 and postmenopausal women). Infrequent or nondrinkers are advised not to begin light to moderate drinking to reduce the risk of coronary heart disease so as to avoid creating an alcohol-related problem. Similar protective effects can likely be gained through proper diet and exercise.  · Women and the elderly have smaller amounts of body water than men. As a result women and the elderly achieve a higher blood alcohol concentration after drinking the same amount of alcohol.  · Exposing a fetus to alcohol can cause a broad range of birth defects referred to as Fetal Alcohol Syndrome (FAS) or Alcohol-Related Birth Defects (ARBD). Although FAS/ARBD is connected with excessive  alcohol consumption during pregnancy, studies also have reported neurobehavioral problems in infants born to mothers reporting drinking an average of 1 drink per day during pregnancy.  · Heavier drinking (the consumption of more than 4 drinks per occasion by men and more than 3 drinks per occasion by women) impairs learning (cognitive) and psychomotor functions and increases the risk of alcohol-related problems, including accidents and injuries.  CAGE QUESTIONS:   · Have you ever felt that you should Cut down on your drinking?  · Have people Annoyed you by criticizing your drinking?  · Have you ever felt bad or Guilty about your drinking?  · Have you ever had a drink first thing in the morning to steady your nerves or get rid of a hangover (Eye opener)?  If you answered positively to any of these questions: You may be at risk for alcohol-related problems if alcohol consumption is:   · Men: Greater than 14 drinks per week or more than 4 drinks per occasion.  · Women: Greater than 7 drinks per week or more than 3 drinks per occasion.  Do you or your family have a medical history of alcohol-related problems, such as:  · Blackouts.  · Sexual dysfunction.  · Depression.  · Trauma.  · Liver dysfunction.  · Sleep disorders.  · Hypertension.  · Chronic abdominal pain.  · Has your drinking ever caused you problems, such as problems with your family, problems with your work (or school) performance, or accidents/injuries?  · Do you have a compulsion to drink or a preoccupation with drinking?  · Do you have poor control or are you unable to stop drinking once you have started?  · Do you have to drink to avoid withdrawal symptoms?  · Do you have problems with withdrawal such as tremors, nausea, sweats, or mood disturbances?  · Does it take more alcohol than in the past to get you high?  · Do you feel a strong urge to drink?  · Do you change your plans so that you can have a drink?  · Do you ever drink in the morning to relieve  the shakes or a hangover?  If you have answered a number of the previous questions positively, it may be time for you to talk to your caregivers, family, and friends and see if they think you have a problem. Alcoholism is a chemical dependency that keeps getting worse and will eventually destroy your health and relationships. Many alcoholics end up dead, impoverished, or in jail. This is often the end result of all chemical dependency.  · Do not be discouraged if you are not ready to take action immediately.  · Decisions to change behavior often involve up and down desires to change and feeling like you cannot decide.  · Try to think more seriously about your drinking behavior.  · Think of the reasons to quit.  WHERE TO GO FOR ADDITIONAL INFORMATION   · The National [place] on Alcohol Abuse and Alcoholism (NIAAA)  www.niaaa.nih.gov  · National [place] on Alcoholism and Drug Dependence (NCADD)  www.ncadd.org  · American Society of Addiction Medicine (ASAM)  www.asam.org   Document Released: 12/18/2006 Document Revised: 03/11/2013 Document Reviewed: 08/05/2009  [employer]® Patient Information ©2014 [employer], [place].

## 2018-06-22 VITALS
OXYGEN SATURATION: 96 % | DIASTOLIC BLOOD PRESSURE: 92 MMHG | HEIGHT: 64 IN | RESPIRATION RATE: 16 BRPM | WEIGHT: 181 LBS | BODY MASS INDEX: 30.9 KG/M2 | HEART RATE: 82 BPM | SYSTOLIC BLOOD PRESSURE: 147 MMHG | TEMPERATURE: 97.4 F

## 2018-06-22 PROCEDURE — A9270 NON-COVERED ITEM OR SERVICE: HCPCS | Performed by: EMERGENCY MEDICINE

## 2018-06-22 PROCEDURE — 700102 HCHG RX REV CODE 250 W/ 637 OVERRIDE(OP): Performed by: EMERGENCY MEDICINE

## 2018-06-22 RX ORDER — CHLORDIAZEPOXIDE HYDROCHLORIDE 25 MG/1
50 CAPSULE, GELATIN COATED ORAL ONCE
Status: COMPLETED | OUTPATIENT
Start: 2018-06-22 | End: 2018-06-22

## 2018-06-22 RX ORDER — LORAZEPAM 1 MG/1
1 TABLET ORAL ONCE
Status: COMPLETED | OUTPATIENT
Start: 2018-06-22 | End: 2018-06-22

## 2018-06-22 RX ADMIN — CHLORDIAZEPOXIDE HYDROCHLORIDE 50 MG: 25 CAPSULE ORAL at 05:05

## 2018-06-22 RX ADMIN — LORAZEPAM 1 MG: 1 TABLET ORAL at 04:38

## 2018-06-22 NOTE — ED NOTES
Pt ambulated to restroom. Pt ate a large majority of the meal box provided. Pt is holding down PO fluids without difficulty. POC complete, see labs.
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
## 2018-06-22 NOTE — ED NOTES
Pt is now discharged, ss work at bedside with nurse giving instructions for places for patient to stay.  Pt ambulatory with steady gait, pt verbalized understanding of poc and discharge , no questions ,  VSS and pt in nad at this time

## 2018-06-22 NOTE — ED NOTES
SS is working on getting pt to a shelter for the night with a ride.  SS will speak with patient once discharged

## 2018-06-22 NOTE — DISCHARGE PLANNING
Medical Social Work     Referral: resources     SW received a call from the RN requesting SW to met with the pt and provided the pt with homeless and community resources.  SW met with the pt and provided her with resources. SW also advised the pt that she had Medicaid FFS and should have MTM the transportation service.     Plan: SW will remain available for pt and family support.

## 2018-06-22 NOTE — DISCHARGE INSTRUCTIONS
Alcohol Intoxication  Alcohol intoxication occurs when a person no longer thinks clearly or functions well (becomes impaired) after drinking alcohol. Intoxication can occur with even one drink. The level of impairment depends on:  · The amount of alcohol the person had.  · The person's age, gender, and weight.  · How often the person drinks.  · Whether the person has other medical conditions, such as diabetes, seizures, or a heart condition.  Alcohol intoxication can range in severity from mild to severe. The condition can be dangerous, especially when caused by drinking large amounts of alcohol in a short period of time (binge drinking) or if the person also took certain prescription medicines or recreational drugs.  What are the signs or symptoms?  Symptoms of mild alcohol intoxication include:  · Feeling relaxed or sleepy.  · Mild difficulty with:  ¨ Coordination.  ¨ Speech.  ¨ Memory.  ¨ Attention.  Symptoms of moderate alcohol intoxication include:  · Extreme emotions, such as anger or sadness.  · Moderate difficulty with:  ¨ Coordination.  ¨ Speech.  ¨ Memory.  ¨ Attention.  Symptoms of severe alcohol intoxication include:  · Passing out.  · Vomiting.  · Confusion.  · Slow breathing.  · Coma.  · Severe difficulty with:  ¨ Coordination.  ¨ Speech.  ¨ Memory.  ¨ Attention.  Intoxication, especially in people who are not exposed to alcohol often can progress from mild to severe quickly, and may even cause coma or death.  How is this diagnosed?  This condition may be diagnosed based on:  · A medical history.  · A physical exam.  · A blood test that measures the concentration of alcohol in the blood (blood alcohol content, or DANILO).  · Whether there is a smell of alcohol on the breath.  Your health care provider will ask you how much alcohol you drank and what kind of alcohol you had.  How is this treated?  Usually, treatment is not needed for this condition. Most of the effects of alcohol are temporary and go away  as the alcohol naturally leaves the body. Your health care provider may recommend monitoring until the alcohol level starts to drop and it is safe to go home. You may also get fluids through an IV tube to help prevent dehydration. If the intoxication is severe, a breathing machine called a ventilator may be needed to support your breathing,  Follow these instructions at home:  · Do not drive after drinking alcohol.  · Have someone stay with you while you are intoxicated. You should not be left alone.  · Stay hydrated. Drink enough fluid to keep your urine clear or pale yellow.  · Avoid caffeine because it can dehydrate you.  · Take over-the-counter and prescription medicines only as told by your health care provider.  How is this prevented?  To prevent alcohol intoxication:  · Limit alcohol intake to no more than 1 drink a day for nonpregnant women and 2 drinks a day for men. One drink equals 12 oz of beer, 5 oz of wine, or 1½ oz of hard liquor.  · Do not drink alcohol on an empty stomach.  · Avoid drinking alcohol if:  ¨ You are under the legal drinking age.  ¨ You are pregnant or may be pregnant.  ¨ You are taking medicines that should not be taken with alcohol.  ¨ Your drinking causes your medical condition to get worse.  ¨ You need to drive or perform activities that require your attention.  ¨ You have substance use disorder.  To prevent potentially serious complications of alcohol intoxication, seek immediate medical care if you or someone you know has signs of moderate or severe alcohol intoxication. These include:  · Moderate or severe difficulty with:  ¨ Coordination.  ¨ Speech.  ¨ Memory.  ¨ Attention.  · Passing out.  · Confusion.  · Vomiting.  Do not leave someone alone if he or she is intoxicated.  Contact a health care provider if:  · You do not feel better after a few days.  · You are having problems at work, at school, or at home due to drinking.  Get help right away if:  · You become shaky when you  try to stop drinking.  · You shake uncontrollably (have a seizure).  · You vomit blood. Blood in vomit may look bright red, or it may look like coffee grounds.  · You have blood in your stool. Blood in stool may be bright red, or it may make stool appear black and tarry and make it smell bad.  · You become light-headed or you faint.  If you ever feel like you may hurt yourself or others, or have thoughts about taking your own life, get help right away. You can go to your nearest emergency department or call:  · Your local emergency services (911 in the [place]  · A suicide crisis helpline, such as the National Suicide Prevention Lifeline at 1-976.553.9681. This is open 24 hours a day.  This information is not intended to replace advice given to you by your health care provider. Make sure you discuss any questions you have with your health care provider.  Document Released: 09/27/2006 Document Revised: 09/02/2017 Document Reviewed: 09/02/2017  Elsevier Interactive Patient Education © 2017 [employer]

## 2018-06-22 NOTE — ED NOTES
Patient discharged with instructions for Alcohol intoxication with prescriptions x0 signs and symptoms explained to patient for reasons to return to emergency department, Patient is understanding and has no further questions. Pt given resources for shelter and alcohol detox, pt understanding and wants to follow up for alcohol detox. Pt. Ambulatory to lobby with steady gait.

## 2018-06-22 NOTE — ED NOTES
Pt resp even and non labored, bed in lowest position, sitter in place.  Pt has no complaints at this time

## 2018-07-07 ENCOUNTER — HOSPITAL ENCOUNTER (EMERGENCY)
Dept: HOSPITAL 8 - ED | Age: 58
Discharge: HOME | End: 2018-07-07
Payer: MEDICAID

## 2018-07-07 VITALS — SYSTOLIC BLOOD PRESSURE: 106 MMHG | DIASTOLIC BLOOD PRESSURE: 73 MMHG

## 2018-07-07 VITALS — WEIGHT: 209.44 LBS | BODY MASS INDEX: 32.87 KG/M2 | HEIGHT: 67 IN

## 2018-07-07 DIAGNOSIS — F10.221: Primary | ICD-10-CM

## 2018-07-07 LAB
ANION GAP SERPL CALC-SCNC: 7 MMOL/L (ref 5–15)
CALCIUM SERPL-MCNC: 8.3 MG/DL (ref 8.5–10.1)
CHLORIDE SERPL-SCNC: 109 MMOL/L (ref 98–107)
CREAT SERPL-MCNC: 0.9 MG/DL (ref 0.55–1.02)

## 2018-07-07 PROCEDURE — 80048 BASIC METABOLIC PNL TOTAL CA: CPT

## 2018-07-07 PROCEDURE — 80307 DRUG TEST PRSMV CHEM ANLYZR: CPT

## 2018-07-07 PROCEDURE — 99284 EMERGENCY DEPT VISIT MOD MDM: CPT

## 2018-07-07 PROCEDURE — 36415 COLL VENOUS BLD VENIPUNCTURE: CPT

## 2018-07-08 ENCOUNTER — HOSPITAL ENCOUNTER (EMERGENCY)
Dept: HOSPITAL 8 - ED | Age: 58
LOS: 1 days | Discharge: HOME | End: 2018-07-09
Payer: MEDICAID

## 2018-07-08 DIAGNOSIS — F10.120: ICD-10-CM
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
## 2018-07-08 DIAGNOSIS — K21.9: ICD-10-CM

## 2018-07-08 DIAGNOSIS — F43.21: Primary | ICD-10-CM
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
## 2018-07-08 DIAGNOSIS — T14.91XA: ICD-10-CM

## 2018-07-08 DIAGNOSIS — I10: ICD-10-CM
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
## 2018-07-08 DIAGNOSIS — Z79.899: ICD-10-CM

## 2018-07-08 LAB
ALBUMIN SERPL-MCNC: 3.7 G/DL (ref 3.4–5)
ANION GAP SERPL CALC-SCNC: 11 MMOL/L (ref 5–15)
APAP SERPL-MCNC: < 2 MCG/ML (ref 10–30)
BASOPHILS # BLD AUTO: 0.15 X10^3/UL (ref 0–0.1)
BASOPHILS NFR BLD AUTO: 2 % (ref 0–1)
CALCIUM SERPL-MCNC: 8.3 MG/DL (ref 8.5–10.1)
CHLORIDE SERPL-SCNC: 108 MMOL/L (ref 98–107)
CREAT SERPL-MCNC: 0.96 MG/DL (ref 0.55–1.02)
EOSINOPHIL # BLD AUTO: 0.09 X10^3/UL (ref 0–0.4)
EOSINOPHIL NFR BLD AUTO: 1 % (ref 1–7)
ERYTHROCYTE [DISTWIDTH] IN BLOOD BY AUTOMATED COUNT: 19.2 % (ref 9.6–15.2)
LYMPHOCYTES # BLD AUTO: 2.51 X10^3/UL (ref 1–3.4)
LYMPHOCYTES NFR BLD AUTO: 32 % (ref 22–44)
MCH RBC QN AUTO: 30.4 PG (ref 27–34.8)
MCHC RBC AUTO-ENTMCNC: 33.5 G/DL (ref 32.4–35.8)
MCV RBC AUTO: 90.9 FL (ref 80–100)
MD: NO
MONOCYTES # BLD AUTO: 0.67 X10^3/UL (ref 0.2–0.8)
MONOCYTES NFR BLD AUTO: 9 % (ref 2–9)
NEUTROPHILS # BLD AUTO: 4.48 X10^3/UL (ref 1.8–6.8)
NEUTROPHILS NFR BLD AUTO: 57 % (ref 42–75)
PLATELET # BLD AUTO: 442 X10^3/UL (ref 130–400)
PMV BLD AUTO: 7.9 FL (ref 7.4–10.4)
RBC # BLD AUTO: 3.91 X10^6/UL (ref 3.82–5.3)
VANCOMYCIN TROUGH SERPL-MCNC: 2 MG/DL (ref 2.8–20)

## 2018-07-08 PROCEDURE — 80307 DRUG TEST PRSMV CHEM ANLYZR: CPT

## 2018-07-08 PROCEDURE — 80329 ANALGESICS NON-OPIOID 1 OR 2: CPT

## 2018-07-08 PROCEDURE — G0480 DRUG TEST DEF 1-7 CLASSES: HCPCS

## 2018-07-08 PROCEDURE — 80048 BASIC METABOLIC PNL TOTAL CA: CPT

## 2018-07-08 PROCEDURE — 36415 COLL VENOUS BLD VENIPUNCTURE: CPT

## 2018-07-08 PROCEDURE — 99284 EMERGENCY DEPT VISIT MOD MDM: CPT

## 2018-07-08 PROCEDURE — 85025 COMPLETE CBC W/AUTO DIFF WBC: CPT

## 2018-07-08 PROCEDURE — 82040 ASSAY OF SERUM ALBUMIN: CPT

## 2018-07-09 VITALS — DIASTOLIC BLOOD PRESSURE: 70 MMHG | SYSTOLIC BLOOD PRESSURE: 126 MMHG

## 2018-07-18 ENCOUNTER — APPOINTMENT (OUTPATIENT)
Dept: RADIOLOGY | Facility: MEDICAL CENTER | Age: 58
DRG: 897 | End: 2018-07-18
Attending: EMERGENCY MEDICINE
Payer: MEDICAID

## 2018-07-18 ENCOUNTER — HOSPITAL ENCOUNTER (INPATIENT)
Facility: MEDICAL CENTER | Age: 58
LOS: 5 days | DRG: 897 | End: 2018-07-23
Attending: EMERGENCY MEDICINE | Admitting: HOSPITALIST
Payer: MEDICAID

## 2018-07-18 DIAGNOSIS — M19.90 ARTHRITIS: ICD-10-CM

## 2018-07-18 DIAGNOSIS — R40.1 STUPOR: ICD-10-CM

## 2018-07-18 DIAGNOSIS — M79.7 FIBROMYALGIA: ICD-10-CM

## 2018-07-18 DIAGNOSIS — F10.921 ALCOHOL INTOXICATION WITH DELIRIUM (HCC): ICD-10-CM

## 2018-07-18 DIAGNOSIS — K08.89 TOOTH ACHE: ICD-10-CM

## 2018-07-18 DIAGNOSIS — N17.9 ACUTE RENAL FAILURE, UNSPECIFIED ACUTE RENAL FAILURE TYPE (HCC): ICD-10-CM

## 2018-07-18 DIAGNOSIS — E86.0 DEHYDRATION: ICD-10-CM

## 2018-07-18 LAB
ABO GROUP BLD: NORMAL
ALBUMIN SERPL BCP-MCNC: 3.2 G/DL (ref 3.2–4.9)
ALBUMIN/GLOB SERPL: 1.3 G/DL
ALP SERPL-CCNC: 123 U/L (ref 30–99)
ALT SERPL-CCNC: 17 U/L (ref 2–50)
AMPHET UR QL SCN: NEGATIVE
ANION GAP SERPL CALC-SCNC: 14 MMOL/L (ref 0–11.9)
APAP SERPL-MCNC: <10 UG/ML (ref 10–30)
APPEARANCE UR: ABNORMAL
APTT PPP: 23.3 SEC (ref 24.7–36)
AST SERPL-CCNC: 36 U/L (ref 12–45)
BACTERIA #/AREA URNS HPF: NEGATIVE /HPF
BARBITURATES UR QL SCN: NEGATIVE
BASOPHILS # BLD AUTO: 1 % (ref 0–1.8)
BASOPHILS # BLD: 0.08 K/UL (ref 0–0.12)
BENZODIAZ UR QL SCN: POSITIVE
BILIRUB SERPL-MCNC: 0.3 MG/DL (ref 0.1–1.5)
BILIRUB UR QL STRIP.AUTO: NEGATIVE
BLD GP AB SCN SERPL QL: NORMAL
BNP SERPL-MCNC: 7 PG/ML (ref 0–100)
BUN SERPL-MCNC: 35 MG/DL (ref 8–22)
BZE UR QL SCN: NEGATIVE
CALCIUM SERPL-MCNC: 7.6 MG/DL (ref 8.5–10.5)
CANNABINOIDS UR QL SCN: NEGATIVE
CHLORIDE SERPL-SCNC: 106 MMOL/L (ref 96–112)
CO2 SERPL-SCNC: 16 MMOL/L (ref 20–33)
COLOR UR: YELLOW
CREAT SERPL-MCNC: 2.02 MG/DL (ref 0.5–1.4)
EKG IMPRESSION: NORMAL
EKG IMPRESSION: NORMAL
EOSINOPHIL # BLD AUTO: 0.07 K/UL (ref 0–0.51)
EOSINOPHIL NFR BLD: 0.8 % (ref 0–6.9)
EPI CELLS #/AREA URNS HPF: ABNORMAL /HPF
ERYTHROCYTE [DISTWIDTH] IN BLOOD BY AUTOMATED COUNT: 64.5 FL (ref 35.9–50)
ETHANOL BLD-MCNC: 0.37 G/DL
GLOBULIN SER CALC-MCNC: 2.5 G/DL (ref 1.9–3.5)
GLUCOSE SERPL-MCNC: 76 MG/DL (ref 65–99)
GLUCOSE UR STRIP.AUTO-MCNC: NEGATIVE MG/DL
HCG SERPL QL: NEGATIVE
HCT VFR BLD AUTO: 31 % (ref 37–47)
HGB BLD-MCNC: 10.1 G/DL (ref 12–16)
HYALINE CASTS #/AREA URNS LPF: ABNORMAL /LPF
IMM GRANULOCYTES # BLD AUTO: 0.05 K/UL (ref 0–0.11)
IMM GRANULOCYTES NFR BLD AUTO: 0.6 % (ref 0–0.9)
INR PPP: 1.04 (ref 0.87–1.13)
KETONES UR STRIP.AUTO-MCNC: NEGATIVE MG/DL
LACTATE BLD-SCNC: 2.3 MMOL/L (ref 0.5–2)
LEUKOCYTE ESTERASE UR QL STRIP.AUTO: NEGATIVE
LIPASE SERPL-CCNC: 33 U/L (ref 11–82)
LYMPHOCYTES # BLD AUTO: 2.1 K/UL (ref 1–4.8)
LYMPHOCYTES NFR BLD: 25.3 % (ref 22–41)
MCH RBC QN AUTO: 30.7 PG (ref 27–33)
MCHC RBC AUTO-ENTMCNC: 32.6 G/DL (ref 33.6–35)
MCV RBC AUTO: 94.2 FL (ref 81.4–97.8)
METHADONE UR QL SCN: NEGATIVE
MICRO URNS: ABNORMAL
MONOCYTES # BLD AUTO: 0.86 K/UL (ref 0–0.85)
MONOCYTES NFR BLD AUTO: 10.4 % (ref 0–13.4)
NEUTROPHILS # BLD AUTO: 5.14 K/UL (ref 2–7.15)
NEUTROPHILS NFR BLD: 61.9 % (ref 44–72)
NITRITE UR QL STRIP.AUTO: NEGATIVE
NRBC # BLD AUTO: 0 K/UL
NRBC BLD-RTO: 0 /100 WBC
OPIATES UR QL SCN: NEGATIVE
OXYCODONE UR QL SCN: NEGATIVE
PCP UR QL SCN: NEGATIVE
PH UR STRIP.AUTO: 5 [PH]
PLATELET # BLD AUTO: 204 K/UL (ref 164–446)
PMV BLD AUTO: 9.6 FL (ref 9–12.9)
POTASSIUM SERPL-SCNC: 3.5 MMOL/L (ref 3.6–5.5)
PROPOXYPH UR QL SCN: NEGATIVE
PROT SERPL-MCNC: 5.7 G/DL (ref 6–8.2)
PROT UR QL STRIP: NEGATIVE MG/DL
PROTHROMBIN TIME: 13.3 SEC (ref 12–14.6)
RBC # BLD AUTO: 3.29 M/UL (ref 4.2–5.4)
RBC # URNS HPF: ABNORMAL /HPF
RBC UR QL AUTO: NEGATIVE
RH BLD: NORMAL
SALICYLATES SERPL-MCNC: 1 MG/DL (ref 15–25)
SODIUM SERPL-SCNC: 136 MMOL/L (ref 135–145)
SP GR UR STRIP.AUTO: 1.01
TROPONIN I SERPL-MCNC: <0.01 NG/ML (ref 0–0.04)
UROBILINOGEN UR STRIP.AUTO-MCNC: 0.2 MG/DL
WBC # BLD AUTO: 8.3 K/UL (ref 4.8–10.8)
WBC #/AREA URNS HPF: ABNORMAL /HPF

## 2018-07-18 PROCEDURE — 71045 X-RAY EXAM CHEST 1 VIEW: CPT

## 2018-07-18 PROCEDURE — 86901 BLOOD TYPING SEROLOGIC RH(D): CPT

## 2018-07-18 PROCEDURE — 81001 URINALYSIS AUTO W/SCOPE: CPT

## 2018-07-18 PROCEDURE — 93010 ELECTROCARDIOGRAM REPORT: CPT | Performed by: INTERNAL MEDICINE

## 2018-07-18 PROCEDURE — 85025 COMPLETE CBC W/AUTO DIFF WBC: CPT

## 2018-07-18 PROCEDURE — 700111 HCHG RX REV CODE 636 W/ 250 OVERRIDE (IP): Performed by: EMERGENCY MEDICINE

## 2018-07-18 PROCEDURE — 700102 HCHG RX REV CODE 250 W/ 637 OVERRIDE(OP): Performed by: HOSPITALIST

## 2018-07-18 PROCEDURE — 36415 COLL VENOUS BLD VENIPUNCTURE: CPT

## 2018-07-18 PROCEDURE — 99223 1ST HOSP IP/OBS HIGH 75: CPT | Performed by: HOSPITALIST

## 2018-07-18 PROCEDURE — A9270 NON-COVERED ITEM OR SERVICE: HCPCS | Performed by: HOSPITALIST

## 2018-07-18 PROCEDURE — 700105 HCHG RX REV CODE 258: Performed by: EMERGENCY MEDICINE

## 2018-07-18 PROCEDURE — 80307 DRUG TEST PRSMV CHEM ANLYZR: CPT

## 2018-07-18 PROCEDURE — 84484 ASSAY OF TROPONIN QUANT: CPT

## 2018-07-18 PROCEDURE — 83880 ASSAY OF NATRIURETIC PEPTIDE: CPT

## 2018-07-18 PROCEDURE — 93005 ELECTROCARDIOGRAM TRACING: CPT | Performed by: HOSPITALIST

## 2018-07-18 PROCEDURE — 93005 ELECTROCARDIOGRAM TRACING: CPT | Performed by: EMERGENCY MEDICINE

## 2018-07-18 PROCEDURE — 83605 ASSAY OF LACTIC ACID: CPT

## 2018-07-18 PROCEDURE — 700101 HCHG RX REV CODE 250: Performed by: HOSPITALIST

## 2018-07-18 PROCEDURE — 83690 ASSAY OF LIPASE: CPT

## 2018-07-18 PROCEDURE — 99285 EMERGENCY DEPT VISIT HI MDM: CPT

## 2018-07-18 PROCEDURE — 80053 COMPREHEN METABOLIC PANEL: CPT

## 2018-07-18 PROCEDURE — 85610 PROTHROMBIN TIME: CPT

## 2018-07-18 PROCEDURE — 85730 THROMBOPLASTIN TIME PARTIAL: CPT

## 2018-07-18 PROCEDURE — 700111 HCHG RX REV CODE 636 W/ 250 OVERRIDE (IP)

## 2018-07-18 PROCEDURE — HZ2ZZZZ DETOXIFICATION SERVICES FOR SUBSTANCE ABUSE TREATMENT: ICD-10-PCS | Performed by: HOSPITALIST

## 2018-07-18 PROCEDURE — 96374 THER/PROPH/DIAG INJ IV PUSH: CPT

## 2018-07-18 PROCEDURE — 70450 CT HEAD/BRAIN W/O DYE: CPT

## 2018-07-18 PROCEDURE — 86850 RBC ANTIBODY SCREEN: CPT

## 2018-07-18 PROCEDURE — 700111 HCHG RX REV CODE 636 W/ 250 OVERRIDE (IP): Performed by: HOSPITALIST

## 2018-07-18 PROCEDURE — 84703 CHORIONIC GONADOTROPIN ASSAY: CPT

## 2018-07-18 PROCEDURE — 86900 BLOOD TYPING SEROLOGIC ABO: CPT

## 2018-07-18 PROCEDURE — 96375 TX/PRO/DX INJ NEW DRUG ADDON: CPT

## 2018-07-18 PROCEDURE — 770006 HCHG ROOM/CARE - MED/SURG/GYN SEMI*

## 2018-07-18 PROCEDURE — 72125 CT NECK SPINE W/O DYE: CPT

## 2018-07-18 PROCEDURE — 87040 BLOOD CULTURE FOR BACTERIA: CPT

## 2018-07-18 RX ORDER — SODIUM CHLORIDE AND POTASSIUM CHLORIDE 150; 900 MG/100ML; MG/100ML
INJECTION, SOLUTION INTRAVENOUS CONTINUOUS
Status: DISCONTINUED | OUTPATIENT
Start: 2018-07-18 | End: 2018-07-23 | Stop reason: HOSPADM

## 2018-07-18 RX ORDER — OMEPRAZOLE 20 MG/1
20 CAPSULE, DELAYED RELEASE ORAL DAILY
Status: DISCONTINUED | OUTPATIENT
Start: 2018-07-18 | End: 2018-07-23 | Stop reason: HOSPADM

## 2018-07-18 RX ORDER — POLYETHYLENE GLYCOL 3350 17 G/17G
1 POWDER, FOR SOLUTION ORAL
Status: DISCONTINUED | OUTPATIENT
Start: 2018-07-18 | End: 2018-07-20

## 2018-07-18 RX ORDER — AMOXICILLIN 250 MG
2 CAPSULE ORAL 2 TIMES DAILY
Status: DISCONTINUED | OUTPATIENT
Start: 2018-07-18 | End: 2018-07-20

## 2018-07-18 RX ORDER — NALOXONE HYDROCHLORIDE 1 MG/ML
2 INJECTION INTRAMUSCULAR; INTRAVENOUS; SUBCUTANEOUS ONCE
Status: COMPLETED | OUTPATIENT
Start: 2018-07-18 | End: 2018-07-18

## 2018-07-18 RX ORDER — LISINOPRIL 10 MG/1
10 TABLET ORAL DAILY
COMMUNITY
End: 2018-10-02

## 2018-07-18 RX ORDER — LORAZEPAM 0.5 MG/1
0.5 TABLET ORAL EVERY 4 HOURS PRN
Status: DISCONTINUED | OUTPATIENT
Start: 2018-07-18 | End: 2018-07-23 | Stop reason: HOSPADM

## 2018-07-18 RX ORDER — SODIUM CHLORIDE 9 MG/ML
1000 INJECTION, SOLUTION INTRAVENOUS ONCE
Status: COMPLETED | OUTPATIENT
Start: 2018-07-18 | End: 2018-07-19

## 2018-07-18 RX ORDER — BISACODYL 10 MG
10 SUPPOSITORY, RECTAL RECTAL
Status: DISCONTINUED | OUTPATIENT
Start: 2018-07-18 | End: 2018-07-20

## 2018-07-18 RX ORDER — LORAZEPAM 1 MG/1
1 TABLET ORAL EVERY 4 HOURS PRN
Status: DISCONTINUED | OUTPATIENT
Start: 2018-07-18 | End: 2018-07-23 | Stop reason: HOSPADM

## 2018-07-18 RX ORDER — NALOXONE HYDROCHLORIDE 1 MG/ML
INJECTION INTRAMUSCULAR; INTRAVENOUS; SUBCUTANEOUS
Status: COMPLETED
Start: 2018-07-18 | End: 2018-07-18

## 2018-07-18 RX ORDER — LABETALOL HYDROCHLORIDE 5 MG/ML
10 INJECTION, SOLUTION INTRAVENOUS EVERY 4 HOURS PRN
Status: DISCONTINUED | OUTPATIENT
Start: 2018-07-18 | End: 2018-07-23 | Stop reason: HOSPADM

## 2018-07-18 RX ORDER — CHLORDIAZEPOXIDE HYDROCHLORIDE 25 MG/1
25 CAPSULE, GELATIN COATED ORAL EVERY 6 HOURS
Status: COMPLETED | OUTPATIENT
Start: 2018-07-19 | End: 2018-07-21

## 2018-07-18 RX ORDER — LORAZEPAM 2 MG/ML
1.5 INJECTION INTRAMUSCULAR
Status: DISCONTINUED | OUTPATIENT
Start: 2018-07-18 | End: 2018-07-23 | Stop reason: HOSPADM

## 2018-07-18 RX ORDER — LORAZEPAM 2 MG/ML
0.5 INJECTION INTRAMUSCULAR EVERY 4 HOURS PRN
Status: DISCONTINUED | OUTPATIENT
Start: 2018-07-18 | End: 2018-07-23 | Stop reason: HOSPADM

## 2018-07-18 RX ORDER — FLUOXETINE HYDROCHLORIDE 40 MG/1
40 CAPSULE ORAL DAILY
COMMUNITY
End: 2018-10-02

## 2018-07-18 RX ORDER — ONDANSETRON 2 MG/ML
4 INJECTION INTRAMUSCULAR; INTRAVENOUS EVERY 4 HOURS PRN
Status: DISCONTINUED | OUTPATIENT
Start: 2018-07-18 | End: 2018-07-23 | Stop reason: HOSPADM

## 2018-07-18 RX ORDER — POTASSIUM CHLORIDE 20 MEQ/1
20 TABLET, EXTENDED RELEASE ORAL DAILY
COMMUNITY
End: 2018-10-02

## 2018-07-18 RX ORDER — CLONAZEPAM 0.5 MG/1
0.5 TABLET ORAL 2 TIMES DAILY
Status: DISCONTINUED | OUTPATIENT
Start: 2018-07-18 | End: 2018-07-18

## 2018-07-18 RX ORDER — PROMETHAZINE HYDROCHLORIDE 25 MG/1
12.5-25 SUPPOSITORY RECTAL EVERY 4 HOURS PRN
Status: DISCONTINUED | OUTPATIENT
Start: 2018-07-18 | End: 2018-07-23 | Stop reason: HOSPADM

## 2018-07-18 RX ORDER — THIAMINE MONONITRATE (VIT B1) 100 MG
100 TABLET ORAL DAILY
Status: COMPLETED | OUTPATIENT
Start: 2018-07-18 | End: 2018-07-21

## 2018-07-18 RX ORDER — PROMETHAZINE HYDROCHLORIDE 25 MG/1
12.5-25 TABLET ORAL EVERY 4 HOURS PRN
Status: DISCONTINUED | OUTPATIENT
Start: 2018-07-18 | End: 2018-07-23 | Stop reason: HOSPADM

## 2018-07-18 RX ORDER — NEOMYCIN SULFATE, POLYMYXIN B SULFATE AND DEXAMETHASONE 3.5; 10000; 1 MG/ML; [USP'U]/ML; MG/ML
SUSPENSION/ DROPS OPHTHALMIC
Status: COMPLETED
Start: 2018-07-18 | End: 2018-07-18

## 2018-07-18 RX ORDER — LORAZEPAM 2 MG/ML
2 INJECTION INTRAMUSCULAR
Status: DISCONTINUED | OUTPATIENT
Start: 2018-07-18 | End: 2018-07-23 | Stop reason: HOSPADM

## 2018-07-18 RX ORDER — ACETAMINOPHEN 325 MG/1
650 TABLET ORAL EVERY 6 HOURS PRN
Status: DISCONTINUED | OUTPATIENT
Start: 2018-07-18 | End: 2018-07-23 | Stop reason: HOSPADM

## 2018-07-18 RX ORDER — CHLORDIAZEPOXIDE HYDROCHLORIDE 25 MG/1
50 CAPSULE, GELATIN COATED ORAL EVERY 6 HOURS
Status: COMPLETED | OUTPATIENT
Start: 2018-07-18 | End: 2018-07-19

## 2018-07-18 RX ORDER — CEFTRIAXONE SODIUM 2 G/50ML
2 INJECTION, SOLUTION INTRAVENOUS ONCE
Status: COMPLETED | OUTPATIENT
Start: 2018-07-18 | End: 2018-07-18

## 2018-07-18 RX ORDER — NICOTINE 21 MG/24HR
14 PATCH, TRANSDERMAL 24 HOURS TRANSDERMAL
Status: DISCONTINUED | OUTPATIENT
Start: 2018-07-18 | End: 2018-07-23 | Stop reason: HOSPADM

## 2018-07-18 RX ORDER — LORAZEPAM 1 MG/1
2 TABLET ORAL
Status: DISCONTINUED | OUTPATIENT
Start: 2018-07-18 | End: 2018-07-23 | Stop reason: HOSPADM

## 2018-07-18 RX ORDER — LORAZEPAM 1 MG/1
3 TABLET ORAL
Status: DISCONTINUED | OUTPATIENT
Start: 2018-07-18 | End: 2018-07-23 | Stop reason: HOSPADM

## 2018-07-18 RX ORDER — FOLIC ACID 1 MG/1
1 TABLET ORAL DAILY
Status: COMPLETED | OUTPATIENT
Start: 2018-07-18 | End: 2018-07-21

## 2018-07-18 RX ORDER — DOXEPIN HYDROCHLORIDE 50 MG/1
50 CAPSULE ORAL
COMMUNITY
End: 2018-10-02

## 2018-07-18 RX ORDER — FLUOXETINE HYDROCHLORIDE 20 MG/1
40 CAPSULE ORAL DAILY
Status: DISCONTINUED | OUTPATIENT
Start: 2018-07-19 | End: 2018-07-23 | Stop reason: HOSPADM

## 2018-07-18 RX ORDER — LORAZEPAM 2 MG/ML
1 INJECTION INTRAMUSCULAR
Status: DISCONTINUED | OUTPATIENT
Start: 2018-07-18 | End: 2018-07-23 | Stop reason: HOSPADM

## 2018-07-18 RX ORDER — LORAZEPAM 1 MG/1
4 TABLET ORAL
Status: DISCONTINUED | OUTPATIENT
Start: 2018-07-18 | End: 2018-07-23 | Stop reason: HOSPADM

## 2018-07-18 RX ORDER — MAGNESIUM SULFATE HEPTAHYDRATE 40 MG/ML
2 INJECTION, SOLUTION INTRAVENOUS ONCE
Status: COMPLETED | OUTPATIENT
Start: 2018-07-18 | End: 2018-07-18

## 2018-07-18 RX ORDER — POTASSIUM CHLORIDE 20 MEQ/1
20 TABLET, EXTENDED RELEASE ORAL 2 TIMES DAILY
Status: DISCONTINUED | OUTPATIENT
Start: 2018-07-18 | End: 2018-07-23 | Stop reason: HOSPADM

## 2018-07-18 RX ORDER — ONDANSETRON 4 MG/1
4 TABLET, ORALLY DISINTEGRATING ORAL EVERY 4 HOURS PRN
Status: DISCONTINUED | OUTPATIENT
Start: 2018-07-18 | End: 2018-07-23 | Stop reason: HOSPADM

## 2018-07-18 RX ADMIN — MAGNESIUM SULFATE IN WATER 2 G: 40 INJECTION, SOLUTION INTRAVENOUS at 18:13

## 2018-07-18 RX ADMIN — FOLIC ACID 1 MG: 1 TABLET ORAL at 18:15

## 2018-07-18 RX ADMIN — SODIUM CHLORIDE 1000 ML: 9 INJECTION, SOLUTION INTRAVENOUS at 15:37

## 2018-07-18 RX ADMIN — NALOXONE HYDROCHLORIDE 2 MG: 1 INJECTION PARENTERAL at 13:45

## 2018-07-18 RX ADMIN — STANDARDIZED SENNA CONCENTRATE AND DOCUSATE SODIUM 2 TABLET: 8.6; 5 TABLET, FILM COATED ORAL at 18:10

## 2018-07-18 RX ADMIN — POTASSIUM CHLORIDE 20 MEQ: 1500 TABLET, EXTENDED RELEASE ORAL at 18:15

## 2018-07-18 RX ADMIN — THIAMINE HCL TAB 100 MG 100 MG: 100 TAB at 18:15

## 2018-07-18 RX ADMIN — CEFTRIAXONE SODIUM 2 G: 2 INJECTION, SOLUTION INTRAVENOUS at 14:15

## 2018-07-18 RX ADMIN — SODIUM CHLORIDE 1000 ML: 9 INJECTION, SOLUTION INTRAVENOUS at 13:45

## 2018-07-18 RX ADMIN — OMEPRAZOLE 20 MG: 20 CAPSULE, DELAYED RELEASE ORAL at 18:15

## 2018-07-18 RX ADMIN — THERA TABS 1 TABLET: TAB at 18:11

## 2018-07-18 RX ADMIN — LORAZEPAM 0.5 MG: 0.5 TABLET ORAL at 18:45

## 2018-07-18 RX ADMIN — NALOXONE HYDROCHLORIDE 2 MG: 1 INJECTION INTRAMUSCULAR; INTRAVENOUS; SUBCUTANEOUS at 13:45

## 2018-07-18 RX ADMIN — POTASSIUM CHLORIDE AND SODIUM CHLORIDE: 900; 150 INJECTION, SOLUTION INTRAVENOUS at 18:10

## 2018-07-18 RX ADMIN — CHLORDIAZEPOXIDE HYDROCHLORIDE 50 MG: 25 CAPSULE ORAL at 18:11

## 2018-07-18 ASSESSMENT — LIFESTYLE VARIABLES
ANXIETY: MILDLY ANXIOUS
TOTAL SCORE: 4
ORIENTATION AND CLOUDING OF SENSORIUM: CANNOT DO SERIAL ADDITIONS OR IS UNCERTAIN ABOUT DATE
NAUSEA AND VOMITING: NO NAUSEA AND NO VOMITING
NAUSEA AND VOMITING: MILD NAUSEA WITH NO VOMITING
AUDITORY DISTURBANCES: NOT PRESENT
ORIENTATION AND CLOUDING OF SENSORIUM: DATE DISORIENTATION BY NO MORE THAN TWO CALENDAR DAYS
AGITATION: NORMAL ACTIVITY
HEADACHE, FULLNESS IN HEAD: VERY MILD
AGITATION: NORMAL ACTIVITY
AUDITORY DISTURBANCES: NOT PRESENT
TREMOR: NO TREMOR
TOTAL SCORE: VERY MILD ITCHING, PINS AND NEEDLES SENSATION, BURNING OR NUMBNESS
ANXIETY: NO ANXIETY (AT EASE)
PAROXYSMAL SWEATS: NO SWEAT VISIBLE
HEADACHE, FULLNESS IN HEAD: MILD
PAROXYSMAL SWEATS: BARELY PERCEPTIBLE SWEATING. PALMS MOIST
VISUAL DISTURBANCES: NOT PRESENT
TREMOR: TREMOR NOT VISIBLE BUT CAN BE FELT, FINGERTIP TO FINGERTIP
VISUAL DISTURBANCES: NOT PRESENT
TOTAL SCORE: 7

## 2018-07-18 ASSESSMENT — PAIN SCALES - GENERAL: PAINLEVEL_OUTOF10: 0

## 2018-07-18 NOTE — ED NOTES
Blood drawn and sent; two NS bags on pressure bags started. Rectal exam performed with light positive. C-collar placed per MD order for possible fall per pt.

## 2018-07-18 NOTE — ED TRIAGE NOTES
"Pt found unresponsive in field due to intoxication. [employer] called EMS and pt was only responsive to pain stimuli. ERP called to bedside due to pressure of 63/33 upon arrival and oxygen saturation at 88% on 4L oxygen. FSBG 109.     .Ht 1.753 m (5' 9\")   Wt 99.8 kg (220 lb)   LMP 11/02/2015   BMI 32.49 kg/m²     "

## 2018-07-18 NOTE — ED NOTES
Med rec complete per [facility] pharmacy in [place]. Pt unable to answering questions-unable to review medications. Per pharmacy, pt's meds were recently filled.

## 2018-07-18 NOTE — H&P
Hospital Medicine History & Physical Note    Date of Service  7/31/2018    Primary Care Physician  Pcp Pt States None    Consultants  None    Code Status  Full code    Chief Complaint  Altered level of consciousness    History of Presenting Illness  57 y.o. female who presented 7/18/2018 with dehydration and hypotension. Patient is actively intoxicated. She has a negative head CT. Due to her fall however she remains in a cervical collar however the CT of the C-spine is negative and that she will be able be taken out of the c-collar. Patient at this point will be admitted to the medical floor for IV hydration. Patient is very confused she is slurring her words she is not able to cooperate fully with the examination she will at this point require IV fluid resuscitation and we will also put her on a CIWA protocol with her alcohol withdrawal. I will initiate her also on thiamine and folic acid multivitamin and she will be given pain medication as well as blood pressure management.     Review of Systems  Review of Systems   Unable to perform ROS: Acuity of condition       Past Medical History   has a past medical history of Alcoholism ([facility]); Anxiety; Arthritis; ASTHMA; Cancer ([facility]) (1981); Chronic low back pain; Congestive heart failure ([facility]); Depression; EtOH dependence ([facility]); Fall; GERD (gastroesophageal reflux disease); HTN; Hypertension; Indigestion; Muscle disorder; OSTEOPOROSIS; Other specified symptom associated with female genital organs; Psychiatric disorder; PTSD (post-traumatic stress disorder); Renal disorder; Seizure ([facility]); Ulcer; and Vitamin D deficiency. She also has no past medical history of Heart murmur.    Surgical History   has a past surgical history that includes hernia repair (1977); cysto stent placemnt pre surg (10/7/2010); cystoscopy stent placement (11/9/2010); ureteroscopy (11/9/2010); lasertripsy (11/9/2010); and stent removal (11/9/2010).     Family History  family history includes Cancer in  her paternal grandmother; Depression in her other; Diabetes in her father; Heart Disease in her father; Hypertension in her father.     Social History   reports that she has been smoking Cigarettes.  She has a 10.00 pack-year smoking history. She has never used smokeless tobacco. She reports that she drinks alcohol. She reports that she does not use drugs.    Allergies  Allergies   Allergen Reactions   • Sulfa Drugs Vomiting   • Toradol Vomiting   • Neurontin [Gabapentin]      Bowel incontinence         Medications  Prior to Admission Medications   Prescriptions Last Dose Informant Patient Reported? Taking?   Magnesium 400 MG Cap unk  No No   Sig: Take 1 tablet by mouth 2 Times a Day.   clonazepam (KLONOPIN) 1 MG Tab unk at Unknown time  Yes No   Sig: Take 0.5 mg by mouth 2 times a day. Unknown dose   fluoxetine (PROZAC) 10 MG Cap unk at Unknown time  Yes No   Sig: Take 10 mg by mouth every day. Unknown dose   hydrocodone-acetaminophen (NORCO) 5-325 MG Tab per tablet unk  No No   Sig: Take 1-2 Tabs by mouth every four hours as needed.   lisinopril (PRINIVIL) 10 MG Tab unk at Unknown time  Yes No   Sig: Take 10 mg by mouth.   lorazepam (ATIVAN) 1 MG Tab unk  Yes No   Sig: Take 1 mg by mouth 3 times a day.   omeprazole (PRILOSEC) 20 MG delayed-release capsule   Yes No   Sig: Take 20 mg by mouth every day.   spironolactone (ALDACTONE) 25 MG Tab unk at Unknown time  Yes No   Sig: Take 25 mg by mouth every day. Unknown dose      Facility-Administered Medications: None       Physical Exam      Temperature: 36.7 °C (98 °F)   Pulse: 82   Respiration: 14   Pulse Oximetry: 100 %     Physical Exam   Constitutional: She appears well-developed and well-nourished. She appears lethargic. She is uncooperative. She has a sickly appearance. Cervical collar and nasal cannula in place.   HENT:   Head: Normocephalic and atraumatic.   Right Ear: External ear normal.   Left Ear: External ear normal.   Nose: Nose normal.   Mouth/Throat:  Oropharynx is clear and moist.   Eyes: Conjunctivae and EOM are normal. Pupils are equal, round, and reactive to light.   Neck: No JVD present. Muscular tenderness present. Decreased range of motion present.   Cardiovascular: Normal rate and regular rhythm.    No murmur heard.  Pulmonary/Chest: Effort normal and breath sounds normal. She has no wheezes. She has no rales. She exhibits no tenderness.   Abdominal: Soft. Normal appearance and bowel sounds are normal. She exhibits no distension and no mass. There is tenderness in the epigastric area. There is no rebound and no guarding.   Musculoskeletal: She exhibits no edema or tenderness.   Neurological: She has normal reflexes. She appears lethargic. She is disoriented. No cranial nerve deficit. GCS eye subscore is 3. GCS verbal subscore is 4. GCS motor subscore is 5.   Skin: Skin is warm and dry. No rash noted. No erythema.   Psychiatric: Her speech is delayed and slurred. She is slowed. Cognition and memory are impaired. She expresses impulsivity. She exhibits a depressed mood.   Nursing note and vitals reviewed.      Laboratory:      Recent Labs      07/28/18   1401   SODIUM  143   POTASSIUM  3.1*   CHLORIDE  105   CO2  25   GLUCOSE  117*   BUN  10   CREATININE  0.66   CALCIUM  9.0     Recent Labs      07/28/18   1401   GLUCOSE  117*                 Lab Results   Component Value Date    TROPONINI <0.01 07/18/2018       Urinalysis:    Lab Results   Component Value Date    SPECGRAVITY 1.014 07/18/2018    GLUCOSEUR Negative 07/18/2018    KETONES Negative 07/18/2018    NITRITE Negative 07/18/2018    WBCURINE 0-2 07/18/2018    RBCURINE 0-2 07/18/2018    BACTERIA Negative 07/18/2018    EPITHELCELL Few 07/18/2018        Imaging:  DX-LUMBAR SPINE-2 OR 3 VIEWS   Final Result         No acute compression fracture or malalignment.      ECHOCARDIOGRAM COMP W/O CONT   Final Result      DX-KNEE 3 VIEWS RIGHT   Final Result         1. No acute osseous abnormality.       DX-CHEST-PORTABLE (1 VIEW)   Final Result      No acute cardiac or pulmonary abnormalities are identified.      CT-CSPINE WITHOUT PLUS RECONS   Final Result      Degenerative change without evidence of fracture.      CT-HEAD W/O   Final Result      Head CT without contrast within normal limits. No evidence of acute cerebral hemorrhage or mass lesion.            Assessment/Plan:  I anticipate this patient will require at least two midnights for appropriate medical management, necessitating inpatient admission.    * Alcohol intoxication with delirium and withdrawal (HCC)- (present on admission)   Assessment & Plan    Patient has recurrent episodes of alcohol intoxication.   In the meantime patient will be given thiamine folic acid multivitamin.  CIWA protocol if withdrawing  Librium started.   Monitor magnesium and potassium levels and replace.         Dehydration, severe   Assessment & Plan    Patient's dehydration is most likely secondary to alcohol intoxication.  Continue at this point with IV fluid resuscitation and monitor at this point sodium levels as well as blood pressure and hydration status.          Acute kidney injury (HCC)   Assessment & Plan    Acute renal failure secondary to prerenal azotemia where the patient's dehydration is caused by alcoholic intoxication.  Continue with hydration and monitor renal functions.           Hypokalemia- (present on admission)   Assessment & Plan    Potassium level is low at 3.5, supplement potassium with oral potassium at this point.          Drug-seeking behavior   Assessment & Plan    Patient will need to be counseled and evaluated, she may need to be set up with outpatient pain management clinic.           Chronic pain   Assessment & Plan    Pain management and will need outpatient pain management clinic evaluation.         Homeless- (present on admission)   Assessment & Plan    Case management has been consulted        Hypertension- (present on admission)    Assessment & Plan    Hold Blood pressure management until she has improved with hydration,        Hypotension- (present on admission)   Assessment & Plan    Hypotension is secondary to the dehydration. At this point the patient will need continued hydration to get her blood pressure back to normal.          Normocytic anemia- (present on admission)   Assessment & Plan    IF H&H drops below 7&21 will need transfusion or if unstable.  Check iron studies.        Depression- (present on admission)   Assessment & Plan    Continue at this point with Prozac.        GERD (gastroesophageal reflux disease)- (present on admission)   Assessment & Plan    Continue at this point with PPI.            VTE prophylaxis: heparin

## 2018-07-18 NOTE — ED PROVIDER NOTES
"CHIEF COMPLAINT  Chief Complaint   Patient presents with   • Unresponsive     pt responsive only to painful stimuli upon EMS arrival    • Alcohol Intoxication     pt admits to alcohol intoxication but doesn't state how much; denies drug use       HPI  Ashleigh Marquis is a 57 y.o. female who presents to the emergency department for altered mental status.  The patient was found down.  She is unresponsive.  She was found to have a low blood pressures given IV fluids by prehospital crew.  Sugar was normal.  No other issues obtainable.  She does answer questions yes or no.  States she has had pain and hit her head.  Denies any chest or abdominal pain.  Denies any numbness tingling or weakness.  Moves all extremities.  Denies coingestions.  Unable to obtain any additional history at this time because of her mental status per    REVIEW OF SYSTEMS  See HPI for further details.  Unable to obtain secondary to altered mental status.    PAST MEDICAL HISTORY  Past Medical History:   Diagnosis Date   • Alcoholism (HCC)    • Anxiety    • Arthritis    • ASTHMA    • Cancer (HCC) 1981    cervical   • Chronic low back pain    • Congestive heart failure (HCC)    • Depression    • EtOH dependence (HCC)    • Fall     alcohol related   • GERD (gastroesophageal reflux disease)    • HTN    • Hypertension    • Indigestion     GERD   • Muscle disorder    • OSTEOPOROSIS    • Other specified symptom associated with female genital organs     \"I have fibroids bad\"\"   • Psychiatric disorder    • PTSD (post-traumatic stress disorder)    • Renal disorder     Hx of stones   • Seizure (HCC)     several years ago r/t alcohol withdrawl   • Ulcer    • Vitamin D deficiency        FAMILY HISTORY  Family History   Problem Relation Age of Onset   • Heart Disease Father    • Hypertension Father    • Diabetes Father    • Cancer Paternal Grandmother    • Depression Other    • Lung Disease Neg Hx    • Stroke Neg Hx        SOCIAL HISTORY  Social History " "    Social History   • Marital status:      Spouse name: N/A   • Number of children: N/A   • Years of education: N/A     Social History Main Topics   • Smoking status: Current Every Day Smoker     Packs/day: 0.50     Years: 20.00     Types: Cigarettes   • Smokeless tobacco: Never Used      Comment: 1/2 pack per day   • Alcohol use Yes      Comment: \"lots of vodka\" currently intoxicated   • Drug use: No   • Sexual activity: No     Other Topics Concern   • Not on file     Social History Narrative    ** Merged History Encounter **            SURGICAL HISTORY  Past Surgical History:   Procedure Laterality Date   • CYSTOSCOPY STENT PLACEMENT  11/9/2010    Performed by ANGEL HARRIS at SURGERY [facility] ORS   • URETEROSCOPY  11/9/2010    Performed by ANGEL HARRIS at SURGERY [facility] ORS   • LASERTRIPSY  11/9/2010    Performed by ANGEL HARRIS at SURGERY [facility] ORS   • STENT REMOVAL  11/9/2010    Performed by ANGEL HARRIS at SURGERY [facility] ORS   • CYSTO STENT PLACEMNT PRE SURG  10/7/2010    Performed by ANGEL HARRIS at SURGERY [facility] ORS   • HERNIA REPAIR  1977       CURRENT MEDICATIONS  Home Medications     Reviewed by Aretha Nolasco (Pharmacy Tech) on 07/18/18 at 1637  Med List Status: Complete   Medication Last Dose Status   doxepin (SINEQUAN) 50 MG Cap unknown Active   fluoxetine (PROZAC) 40 MG capsule unknown Active   lisinopril (PRINIVIL) 10 MG Tab unknown Active   lorazepam (ATIVAN) 1 MG Tab unknown Active   potassium chloride SA (KDUR) 20 MEQ Tab CR unknown Active   spironolactone (ALDACTONE) 25 MG Tab unknown Active                ALLERGIES  Allergies   Allergen Reactions   • Sulfa Drugs Vomiting   • Toradol Vomiting   • Neurontin [Gabapentin]      Bowel incontinence         PHYSICAL EXAM  VITAL SIGNS: Pulse 82   Temp 36.7 °C (98 °F)   Resp 14   Ht 1.753 m (5' 9\")   Wt 99.8 kg (220 lb)   LMP 11/02/2015   SpO2 100%   BMI 32.49 kg/m²    Constitutional:    HENT: " Normocephalic, tenderness in the back of the scalp.  Dry mucous membranes.  No pharyngeal erythema.  Eyes: PERRL, EOMI, Conjunctiva normal, No discharge.   Neck: Nontender: No step-offs not ranged.  Too intoxicated to clear.  Cardiovascular: Normal heart rate, Normal rhythm, No murmurs, No rubs, No gallops.   Thorax & Lungs: Normal breath sounds, No respiratory distress, No wheezing, No chest tenderness.   Abdomen: Bowel sounds normal, Soft, No tenderness,   Rectal exam: Brown trace positive.  Skin: Warm, Dry, No erythema, No rash.   Musculoskeletal: Good range of motion in all major joint  Neurologic: Somnolent but arousable.  Moves all extremities.  Psychiatric: unable to assess    EKG  Twelve-lead EKG shows normal sinus rhythm.  Intervals are normal axis normal R progression shows early transition.  No ST segment elevation or depression.  Clear impression normal twelve-lead EKG.    RADIOLOGY/PROCEDURES  DX-CHEST-PORTABLE (1 VIEW)   Final Result      No acute cardiac or pulmonary abnormalities are identified.      CT-CSPINE WITHOUT PLUS RECONS   Final Result      Degenerative change without evidence of fracture.      CT-HEAD W/O   Final Result      Head CT without contrast within normal limits. No evidence of acute cerebral hemorrhage or mass lesion.            COURSE & MEDICAL DECISION MAKING  Pertinent Labs & Imaging studies reviewed. (See chart for details).  The patient presents with acute altered mental status.  She was found down with a question history of a fall.  Blood sugar was normal.  She is given Narcan.  She is placed in a monitored a c-collar was placed.    She is hypotensive.  She is warm to the touch.  I suspect this is related to the heat of the day it is more than 100°F.  2 IVs were established and given a fluid bolus.    There is no response to Narcan.  Alcohol level is elevated.  BUN and creatinine are elevated status and acute kidney injury.  She has a low CO2 either related to that or this  could be alcoholic ketoacidosis.    CBC is normal.    Head and neck CT were obtained these are negative.  Chest x-ray is unremarkable.  Urinalysis remains pending.  The patient is responded well to fluids and her mental status is gradually improved.  Because of her acute kidney injury also mental status want to have potential be admitted for continued workup and treatment.    She is admitted to Dr. Yanez for hospital service in guarded condition.  Care transferred at that time.    Patient was given multiple liters of IV fluids.  Indication for IV fluids for hypotension secondary to dehydration.  This is supported by labs, and dry mucous membranes.    She is reassessed after IV fluids and is a good response with blood pressure.    FINAL IMPRESSION  1. Stupor    2. Alcohol intoxication with delirium (HCC)    3. Acute renal failure, unspecified acute renal failure type (HCC)    4. Dehydration      5.  Addendum: Critical care time 40 minutes.          Electronically signed by: Win Delgado, 7/18/2018 2:31 PM  ED Provider Note

## 2018-07-19 ENCOUNTER — APPOINTMENT (OUTPATIENT)
Dept: RADIOLOGY | Facility: MEDICAL CENTER | Age: 58
DRG: 897 | End: 2018-07-19
Attending: INTERNAL MEDICINE
Payer: MEDICAID

## 2018-07-19 LAB
ALBUMIN SERPL BCP-MCNC: 3.1 G/DL (ref 3.2–4.9)
ALBUMIN/GLOB SERPL: 1.2 G/DL
ALP SERPL-CCNC: 122 U/L (ref 30–99)
ALT SERPL-CCNC: 16 U/L (ref 2–50)
AMMONIA PLAS-SCNC: 62 UMOL/L (ref 11–45)
AMMONIA PLAS-SCNC: 62 UMOL/L (ref 11–45)
ANION GAP SERPL CALC-SCNC: 10 MMOL/L (ref 0–11.9)
AST SERPL-CCNC: 32 U/L (ref 12–45)
BILIRUB SERPL-MCNC: 0.2 MG/DL (ref 0.1–1.5)
BUN SERPL-MCNC: 26 MG/DL (ref 8–22)
CALCIUM SERPL-MCNC: 7.6 MG/DL (ref 8.5–10.5)
CHLORIDE SERPL-SCNC: 114 MMOL/L (ref 96–112)
CO2 SERPL-SCNC: 18 MMOL/L (ref 20–33)
CREAT SERPL-MCNC: 1.05 MG/DL (ref 0.5–1.4)
EKG IMPRESSION: NORMAL
ERYTHROCYTE [DISTWIDTH] IN BLOOD BY AUTOMATED COUNT: 66.2 FL (ref 35.9–50)
GLOBULIN SER CALC-MCNC: 2.6 G/DL (ref 1.9–3.5)
GLUCOSE SERPL-MCNC: 84 MG/DL (ref 65–99)
HAV IGM SERPL QL IA: NEGATIVE
HBV CORE IGM SER QL: NEGATIVE
HBV SURFACE AG SER QL: NEGATIVE
HCT VFR BLD AUTO: 32.7 % (ref 37–47)
HCV AB SER QL: NEGATIVE
HGB BLD-MCNC: 10.4 G/DL (ref 12–16)
LACTATE BLD-SCNC: 0.9 MMOL/L (ref 0.5–2)
MAGNESIUM SERPL-MCNC: 2.1 MG/DL (ref 1.5–2.5)
MAGNESIUM SERPL-MCNC: 2.1 MG/DL (ref 1.5–2.5)
MCH RBC QN AUTO: 30.3 PG (ref 27–33)
MCHC RBC AUTO-ENTMCNC: 31.8 G/DL (ref 33.6–35)
MCV RBC AUTO: 95.3 FL (ref 81.4–97.8)
PHOSPHATE SERPL-MCNC: 3.1 MG/DL (ref 2.5–4.5)
PHOSPHATE SERPL-MCNC: 3.1 MG/DL (ref 2.5–4.5)
PLATELET # BLD AUTO: 190 K/UL (ref 164–446)
PMV BLD AUTO: 9.6 FL (ref 9–12.9)
POTASSIUM SERPL-SCNC: 3.9 MMOL/L (ref 3.6–5.5)
PROT SERPL-MCNC: 5.7 G/DL (ref 6–8.2)
RBC # BLD AUTO: 3.43 M/UL (ref 4.2–5.4)
SODIUM SERPL-SCNC: 142 MMOL/L (ref 135–145)
WBC # BLD AUTO: 6.4 K/UL (ref 4.8–10.8)

## 2018-07-19 PROCEDURE — 93010 ELECTROCARDIOGRAM REPORT: CPT | Performed by: INTERNAL MEDICINE

## 2018-07-19 PROCEDURE — 84100 ASSAY OF PHOSPHORUS: CPT | Mod: 91

## 2018-07-19 PROCEDURE — 36415 COLL VENOUS BLD VENIPUNCTURE: CPT

## 2018-07-19 PROCEDURE — 770006 HCHG ROOM/CARE - MED/SURG/GYN SEMI*

## 2018-07-19 PROCEDURE — 80053 COMPREHEN METABOLIC PANEL: CPT | Mod: 91

## 2018-07-19 PROCEDURE — 82140 ASSAY OF AMMONIA: CPT | Mod: 91

## 2018-07-19 PROCEDURE — 700101 HCHG RX REV CODE 250: Performed by: HOSPITALIST

## 2018-07-19 PROCEDURE — 80074 ACUTE HEPATITIS PANEL: CPT

## 2018-07-19 PROCEDURE — 93005 ELECTROCARDIOGRAM TRACING: CPT | Performed by: HOSPITALIST

## 2018-07-19 PROCEDURE — 83735 ASSAY OF MAGNESIUM: CPT | Mod: 91

## 2018-07-19 PROCEDURE — 73562 X-RAY EXAM OF KNEE 3: CPT | Mod: RT

## 2018-07-19 PROCEDURE — 85027 COMPLETE CBC AUTOMATED: CPT

## 2018-07-19 PROCEDURE — A9270 NON-COVERED ITEM OR SERVICE: HCPCS | Performed by: HOSPITALIST

## 2018-07-19 PROCEDURE — 99233 SBSQ HOSP IP/OBS HIGH 50: CPT | Performed by: INTERNAL MEDICINE

## 2018-07-19 PROCEDURE — 83605 ASSAY OF LACTIC ACID: CPT

## 2018-07-19 PROCEDURE — 700102 HCHG RX REV CODE 250 W/ 637 OVERRIDE(OP): Performed by: HOSPITALIST

## 2018-07-19 RX ORDER — IBUPROFEN 800 MG/1
800 TABLET ORAL EVERY 6 HOURS PRN
Status: DISCONTINUED | OUTPATIENT
Start: 2018-07-19 | End: 2018-07-23 | Stop reason: HOSPADM

## 2018-07-19 RX ADMIN — LORAZEPAM 2 MG: 1 TABLET ORAL at 21:52

## 2018-07-19 RX ADMIN — FLUOXETINE HYDROCHLORIDE 40 MG: 20 CAPSULE ORAL at 05:40

## 2018-07-19 RX ADMIN — CHLORDIAZEPOXIDE HYDROCHLORIDE 50 MG: 25 CAPSULE ORAL at 01:52

## 2018-07-19 RX ADMIN — ACETAMINOPHEN 650 MG: 325 TABLET, FILM COATED ORAL at 08:33

## 2018-07-19 RX ADMIN — THIAMINE HCL TAB 100 MG 100 MG: 100 TAB at 05:41

## 2018-07-19 RX ADMIN — THERA TABS 1 TABLET: TAB at 05:39

## 2018-07-19 RX ADMIN — LORAZEPAM 1 MG: 1 TABLET ORAL at 16:19

## 2018-07-19 RX ADMIN — LORAZEPAM 1 MG: 1 TABLET ORAL at 12:20

## 2018-07-19 RX ADMIN — POTASSIUM CHLORIDE 20 MEQ: 1500 TABLET, EXTENDED RELEASE ORAL at 05:40

## 2018-07-19 RX ADMIN — ACETAMINOPHEN 650 MG: 325 TABLET, FILM COATED ORAL at 16:18

## 2018-07-19 RX ADMIN — MAGNESIUM GLUCONATE 500 MG ORAL TABLET 400 MG: 500 TABLET ORAL at 05:40

## 2018-07-19 RX ADMIN — FOLIC ACID 1 MG: 1 TABLET ORAL at 05:40

## 2018-07-19 RX ADMIN — CHLORDIAZEPOXIDE HYDROCHLORIDE 50 MG: 25 CAPSULE ORAL at 11:44

## 2018-07-19 RX ADMIN — STANDARDIZED SENNA CONCENTRATE AND DOCUSATE SODIUM 2 TABLET: 8.6; 5 TABLET, FILM COATED ORAL at 05:40

## 2018-07-19 RX ADMIN — POTASSIUM CHLORIDE 20 MEQ: 1500 TABLET, EXTENDED RELEASE ORAL at 17:58

## 2018-07-19 RX ADMIN — LORAZEPAM 0.5 MG: 0.5 TABLET ORAL at 08:33

## 2018-07-19 RX ADMIN — NICOTINE 14 MG: 14 PATCH, EXTENDED RELEASE TRANSDERMAL at 05:40

## 2018-07-19 RX ADMIN — MAGNESIUM GLUCONATE 500 MG ORAL TABLET 400 MG: 500 TABLET ORAL at 17:58

## 2018-07-19 RX ADMIN — OMEPRAZOLE 20 MG: 20 CAPSULE, DELAYED RELEASE ORAL at 05:39

## 2018-07-19 RX ADMIN — CHLORDIAZEPOXIDE HYDROCHLORIDE 25 MG: 25 CAPSULE ORAL at 17:58

## 2018-07-19 RX ADMIN — POTASSIUM CHLORIDE AND SODIUM CHLORIDE: 900; 150 INJECTION, SOLUTION INTRAVENOUS at 14:47

## 2018-07-19 RX ADMIN — CHLORDIAZEPOXIDE HYDROCHLORIDE 50 MG: 25 CAPSULE ORAL at 05:39

## 2018-07-19 ASSESSMENT — PATIENT HEALTH QUESTIONNAIRE - PHQ9
2. FEELING DOWN, DEPRESSED, IRRITABLE, OR HOPELESS: SEVERAL DAYS
5. POOR APPETITE OR OVEREATING: SEVERAL DAYS
7. TROUBLE CONCENTRATING ON THINGS, SUCH AS READING THE NEWSPAPER OR WATCHING TELEVISION: SEVERAL DAYS
3. TROUBLE FALLING OR STAYING ASLEEP OR SLEEPING TOO MUCH: SEVERAL DAYS
1. LITTLE INTEREST OR PLEASURE IN DOING THINGS: SEVERAL DAYS
SUM OF ALL RESPONSES TO PHQ9 QUESTIONS 1 AND 2: 2
8. MOVING OR SPEAKING SO SLOWLY THAT OTHER PEOPLE COULD HAVE NOTICED. OR THE OPPOSITE, BEING SO FIGETY OR RESTLESS THAT YOU HAVE BEEN MOVING AROUND A LOT MORE THAN USUAL: NOT AT ALL
SUM OF ALL RESPONSES TO PHQ QUESTIONS 1-9: 8
9. THOUGHTS THAT YOU WOULD BE BETTER OFF DEAD, OR OF HURTING YOURSELF: NOT AT ALL
6. FEELING BAD ABOUT YOURSELF - OR THAT YOU ARE A FAILURE OR HAVE LET YOURSELF OR YOUR FAMILY DOWN: MORE THAN HALF THE DAYS
4. FEELING TIRED OR HAVING LITTLE ENERGY: SEVERAL DAYS

## 2018-07-19 ASSESSMENT — LIFESTYLE VARIABLES
TREMOR: *
AUDITORY DISTURBANCES: MILD HARSHNESS OR ABILITY TO FRIGHTEN
AGITATION: NORMAL ACTIVITY
PAROXYSMAL SWEATS: BARELY PERCEPTIBLE SWEATING. PALMS MOIST
CONSUMPTION TOTAL: INCOMPLETE
ALCOHOL_USE: YES
HEADACHE, FULLNESS IN HEAD: MODERATE
TOTAL SCORE: 4
VISUAL DISTURBANCES: NOT PRESENT
ANXIETY: MILDLY ANXIOUS
AGITATION: NORMAL ACTIVITY
PAROXYSMAL SWEATS: BARELY PERCEPTIBLE SWEATING. PALMS MOIST
NAUSEA AND VOMITING: MILD NAUSEA WITH NO VOMITING
TOTAL SCORE: 9
HAVE PEOPLE ANNOYED YOU BY CRITICIZING YOUR DRINKING: YES
ORIENTATION AND CLOUDING OF SENSORIUM: ORIENTED AND CAN DO SERIAL ADDITIONS
ORIENTATION AND CLOUDING OF SENSORIUM: ORIENTED AND CAN DO SERIAL ADDITIONS
PAROXYSMAL SWEATS: BARELY PERCEPTIBLE SWEATING. PALMS MOIST
AGITATION: NORMAL ACTIVITY
EVER FELT BAD OR GUILTY ABOUT YOUR DRINKING: YES
CONSUMPTION TOTAL: INCOMPLETE
AGITATION: NORMAL ACTIVITY
DOES PATIENT WANT TO TALK TO SOMEONE ABOUT QUITTING: YES
AUDITORY DISTURBANCES: NOT PRESENT
AUDITORY DISTURBANCES: NOT PRESENT
TOTAL SCORE: 5
TREMOR: *
ANXIETY: MILDLY ANXIOUS
DOES PATIENT WANT TO STOP DRINKING: YES
EVER HAD A DRINK FIRST THING IN THE MORNING TO STEADY YOUR NERVES TO GET RID OF A HANGOVER: YES
AGITATION: NORMAL ACTIVITY
ALCOHOL_USE: YES
PAROXYSMAL SWEATS: NO SWEAT VISIBLE
TOTAL SCORE: 12
TREMOR: MODERATE TREMOR WITH ARMS EXTENDED
ANXIETY: NO ANXIETY (AT EASE)
PAROXYSMAL SWEATS: BARELY PERCEPTIBLE SWEATING. PALMS MOIST
TOTAL SCORE: 4
TOTAL SCORE: 4
HEADACHE, FULLNESS IN HEAD: MILD
TREMOR: NO TREMOR
AGITATION: NORMAL ACTIVITY
AUDITORY DISTURBANCES: MILD HARSHNESS OR ABILITY TO FRIGHTEN
TOTAL SCORE: 4
TREMOR: NO TREMOR
HEADACHE, FULLNESS IN HEAD: MODERATE
NAUSEA AND VOMITING: NO NAUSEA AND NO VOMITING
AGITATION: NORMAL ACTIVITY
VISUAL DISTURBANCES: VERY MILD SENSITIVITY
ANXIETY: MILDLY ANXIOUS
ORIENTATION AND CLOUDING OF SENSORIUM: DATE DISORIENTATION BY NO MORE THAN TWO CALENDAR DAYS
NAUSEA AND VOMITING: NO NAUSEA AND NO VOMITING
HAVE PEOPLE ANNOYED YOU BY CRITICIZING YOUR DRINKING: YES
ORIENTATION AND CLOUDING OF SENSORIUM: ORIENTED AND CAN DO SERIAL ADDITIONS
PAROXYSMAL SWEATS: NO SWEAT VISIBLE
EVER HAD A DRINK FIRST THING IN THE MORNING TO STEADY YOUR NERVES TO GET RID OF A HANGOVER: YES
TREMOR: MODERATE TREMOR WITH ARMS EXTENDED
HAVE YOU EVER FELT YOU SHOULD CUT DOWN ON YOUR DRINKING: YES
TOTAL SCORE: 4
NAUSEA AND VOMITING: MILD NAUSEA WITH NO VOMITING
AUDITORY DISTURBANCES: NOT PRESENT
VISUAL DISTURBANCES: VERY MILD SENSITIVITY
ANXIETY: MILDLY ANXIOUS
TOTAL SCORE: 4
EVER_SMOKED: YES
NAUSEA AND VOMITING: NO NAUSEA AND NO VOMITING
ANXIETY: MILDLY ANXIOUS
AUDITORY DISTURBANCES: MILD HARSHNESS OR ABILITY TO FRIGHTEN
HEADACHE, FULLNESS IN HEAD: MILD
TOTAL SCORE: 12
VISUAL DISTURBANCES: NOT PRESENT
HEADACHE, FULLNESS IN HEAD: VERY MILD
TOTAL SCORE: 4
TOTAL SCORE: 4
ANXIETY: MILDLY ANXIOUS
AUDITORY DISTURBANCES: NOT PRESENT
TOTAL SCORE: 10
ORIENTATION AND CLOUDING OF SENSORIUM: DATE DISORIENTATION BY NO MORE THAN TWO CALENDAR DAYS
ORIENTATION AND CLOUDING OF SENSORIUM: ORIENTED AND CAN DO SERIAL ADDITIONS
NAUSEA AND VOMITING: MILD NAUSEA WITH NO VOMITING
EVER FELT BAD OR GUILTY ABOUT YOUR DRINKING: YES
HEADACHE, FULLNESS IN HEAD: MODERATE
PAROXYSMAL SWEATS: NO SWEAT VISIBLE
VISUAL DISTURBANCES: NOT PRESENT
ORIENTATION AND CLOUDING OF SENSORIUM: ORIENTED AND CAN DO SERIAL ADDITIONS
VISUAL DISTURBANCES: NOT PRESENT
VISUAL DISTURBANCES: NOT PRESENT
HEADACHE, FULLNESS IN HEAD: VERY MILD
HAVE YOU EVER FELT YOU SHOULD CUT DOWN ON YOUR DRINKING: YES
TREMOR: *
NAUSEA AND VOMITING: NO NAUSEA AND NO VOMITING

## 2018-07-19 ASSESSMENT — COGNITIVE AND FUNCTIONAL STATUS - GENERAL
CLIMB 3 TO 5 STEPS WITH RAILING: A LITTLE
DAILY ACTIVITIY SCORE: 22
HELP NEEDED FOR BATHING: A LITTLE
SUGGESTED CMS G CODE MODIFIER MOBILITY: CJ
MOBILITY SCORE: 22
TOILETING: A LITTLE
WALKING IN HOSPITAL ROOM: A LITTLE
SUGGESTED CMS G CODE MODIFIER DAILY ACTIVITY: CJ

## 2018-07-19 ASSESSMENT — PAIN SCALES - GENERAL
PAINLEVEL_OUTOF10: 4
PAINLEVEL_OUTOF10: 8
PAINLEVEL_OUTOF10: 4
PAINLEVEL_OUTOF10: 8

## 2018-07-19 NOTE — CARE PLAN
Problem: Safety  Goal: Will remain free from injury  Pt on CIWA. Currently at 4. Edu provided regarding safety. Pt requiring reinforcement. Currently aaox2 self and place. Unable to provide teach back pt mumgely.     Problem: Knowledge Deficit  Goal: Knowledge of disease process/condition, treatment plan, diagnostic tests, and medications will improve  POC discussed with pt. Requiring reinforcement and this RN must provide reminders. Pt currently aaox2.

## 2018-07-19 NOTE — PROGRESS NOTES
2RN skin check completed. Healing scab to right palm and throughout body, healing abrasions to left arm. Otherwise skin intact. Silicone oxygen tubing in use. No tubes, drains, or devices in place. Patient able to turn self in bed.

## 2018-07-19 NOTE — ASSESSMENT & PLAN NOTE
Hypotension is secondary to the dehydration. At this point the patient will need continued hydration to get her blood pressure back to normal.

## 2018-07-19 NOTE — PROGRESS NOTES
Assumed patient care. Patient A&Ox2, self and place, speech is mumbled. Fluids running. Bed alarm in place. Patient on CIWA protocol. Unable to provide history, no family/friends at bedside. On 2L via nasal cannula.

## 2018-07-19 NOTE — ASSESSMENT & PLAN NOTE
Patient's dehydration is most likely secondary to alcohol intoxication.  Continue at this point with IV fluid resuscitation and monitor at this point sodium levels as well as blood pressure and hydration status.

## 2018-07-19 NOTE — PROGRESS NOTES
[facility] Hospitalist Progress Note    Date of Service: 2018    Chief Complaint  57 y.o. female admitted 2018 with Unresponsive (pt responsive only to painful stimuli upon EMS arrival ) and Alcohol Intoxication (pt admits to alcohol intoxication but doesn't state how much; denies drug use)    Interval Problem Update  She was intoxicated with alcohol and was found to be hypotensive on admission. Her blood pressure medications were held and she was given IVF. Her hemodynamics improved but she started having tremors the next day. CIWA protocol initiated.     . She was tremulous when I saw her but mentation is now baseline. She understands and wants to be detoxed.     Consultants/Specialty      Disposition  Home when better after detox        Review of Systems   Unable to perform ROS: Mental acuity      Physical Exam  Laboratory/Imaging   Hemodynamics  Temp (24hrs), Av.7 °C (98 °F), Min:36.5 °C (97.7 °F), Max:36.7 °C (98.1 °F)   Temperature: 36.7 °C (98 °F)  Pulse  Av.4  Min: 70  Max: 105 Heart Rate (Monitored): 87  Blood Pressure: 149/85, NIBP: (!) 92/57      Respiratory      Respiration: 18, Pulse Oximetry: 99 %        RUL Breath Sounds: Clear, RML Breath Sounds: Clear, RLL Breath Sounds: Clear, NOREEN Breath Sounds: Clear, LLL Breath Sounds: Clear    Fluids  No intake or output data in the 24 hours ending 18 1333    Nutrition  Orders Placed This Encounter   Procedures   • Diet Order Regular     Standing Status:   Standing     Number of Occurrences:   1     Order Specific Question:   Diet:     Answer:   Regular [1]     Physical Exam   Constitutional: She appears well-developed and well-nourished.   HENT:   Head: Normocephalic and atraumatic.   Eyes: Conjunctivae and EOM are normal. No scleral icterus.   Neck: Normal range of motion. Neck supple.   Cardiovascular: Regular rhythm.  Exam reveals no gallop and no friction rub.    No murmur heard.  Slight tachycardia   Pulmonary/Chest: Effort normal  and breath sounds normal. No respiratory distress. She has no wheezes. She has no rales.   Abdominal: Soft. Bowel sounds are normal. She exhibits no distension. There is no tenderness. There is no rebound and no guarding.   Musculoskeletal: She exhibits no edema or tenderness.   Neurological: She is alert. Coordination (Tremulous) abnormal.   Grogginess, mild cognitive deficits    Skin: Skin is warm.   Psychiatric: She has a normal mood and affect. Her behavior is normal.       Recent Labs      07/18/18   1330  07/19/18   0024   WBC  8.3  6.4   RBC  3.29*  3.43*   HEMOGLOBIN  10.1*  10.4*   HEMATOCRIT  31.0*  32.7*   MCV  94.2  95.3   MCH  30.7  30.3   MCHC  32.6*  31.8*   RDW  64.5*  66.2*   PLATELETCT  204  190   MPV  9.6  9.6     Recent Labs      07/18/18   1330  07/19/18   0024   SODIUM  136  142   POTASSIUM  3.5*  3.9   CHLORIDE  106  114*   CO2  16*  18*   GLUCOSE  76  84   BUN  35*  26*   CREATININE  2.02*  1.05   CALCIUM  7.6*  7.6*     Recent Labs      07/18/18   1330   APTT  23.3*   INR  1.04     Recent Labs      07/18/18   1330   BNPBTYPENAT  7              Assessment/Plan     * Alcohol intoxication with delirium and withdrawal (HCC)- (present on admission)   Assessment & Plan    Patient has recurrent episodes of alcohol intoxication.   In the meantime patient will be given thiamine folic acid multivitamin.  CIWA protocol if withdrawing  CIWA initiated now as she is withdrawing        Dehydration, severe   Assessment & Plan    Patient's dehydration is most likely secondary to alcohol intoxication.  Continue at this point with IV fluid resuscitation and monitor at this point sodium levels as well as blood pressure and hydration status.  Improved        Acute kidney injury (HCC)   Assessment & Plan    Acute renal failure secondary to prerenal azotemia where the patient's dehydration is caused by alcoholic intoxication.  Monitor this when renal functions with hydration.  Resolved.        Hypokalemia-  (present on admission)   Assessment & Plan    Potassium level was low at 3.5 by Will supplement potassium with oral potassium at this point.  Resolved        Hypertension- (present on admission)   Assessment & Plan    Restart blood pressure medicines        Hypotension- (present on admission)   Assessment & Plan    Hypotension is secondary to the dehydration. At this point the patient will need continued hydration to get her blood pressure back to normal.  Resolved, hypertensive now  Restart blood pressure medicines and treat if withdrawing        Normocytic anemia- (present on admission)   Assessment & Plan    Mild  No active bleeding  Trend H/H  Iron panel B12/folate studies        Depression- (present on admission)   Assessment & Plan    Continue at this point with Prozac.        GERD (gastroesophageal reflux disease)- (present on admission)   Assessment & Plan    Continue at this point with PPI.        I spent 35 minutes, reviewing the chart, notes, vitals, labs, imaging, ordering labs, evaluating Ashleigh Marquis for assessment, enacting the plan above. 50% of the time was spent in counseling Ashleigh Marquis and coordinating care including review of records, pertinent lab data and studies, as well as discussing diagnostic evaluation and work up, planned therapeutic interventions and future disposition of care. Discussed with RN, KOLTON. Time was devoted to counseling and coordinating care including review of records, pertinent lab data and studies, as well as discussing diagnostic evaluation and work up, planned therapeutic interventions and future disposition of care. Where indicated, the assessment and plan reflect discussion of patient with consultants, other healthcare providers, family members, and additional research needed to obtain further information in formulating the plan of care for Ashleigh Marquis. This time includes no procedures or overlap with other providers.      Quality-Core Measures

## 2018-07-19 NOTE — ASSESSMENT & PLAN NOTE
Patient has recurrent episodes of alcohol intoxication.   In the meantime patient will be given thiamine folic acid multivitamin.  CIWA protocol if withdrawing  Librium started.   Monitor magnesium and potassium levels and replace.

## 2018-07-19 NOTE — ASSESSMENT & PLAN NOTE
Acute renal failure secondary to prerenal azotemia where the patient's dehydration is caused by alcoholic intoxication.  Continue with hydration and monitor renal functions.

## 2018-07-19 NOTE — PROGRESS NOTES
Assumed care of pt at 1930. Report received and bedside rounding completed with day RN. Pt in bed. No SOB, or in any acute distress. Pt c/o PIV on RUE. Flushing and drawing. This RN assessed. Secured PIV with nehal. Pt mumbles when she speaks. Confused currently aaox2 self and place. Unable to complete admission profile at this time. Pt lethargic goes in and out of conversation must be woken up. Fall precautions in place,  bed alarm. - Treaded non slip socks. Call light and pt belongings within reach - pt makes needs known - hourly rounding in place. See flowsheets for further assessment.

## 2018-07-19 NOTE — ED NOTES
Per ERP check w/ admitting MD if pt is ok to go to floor w/ hypotension. Admitting MD confirms pt is ok to go to [address]. Pt transported by transporter at this time.

## 2018-07-20 LAB
ALBUMIN SERPL BCP-MCNC: 3.6 G/DL (ref 3.2–4.9)
ALBUMIN/GLOB SERPL: 1.2 G/DL
ALP SERPL-CCNC: 151 U/L (ref 30–99)
ALT SERPL-CCNC: 15 U/L (ref 2–50)
AMMONIA PLAS-SCNC: 52 UMOL/L (ref 11–45)
ANION GAP SERPL CALC-SCNC: 4 MMOL/L (ref 0–11.9)
AST SERPL-CCNC: 32 U/L (ref 12–45)
BILIRUB SERPL-MCNC: 0.3 MG/DL (ref 0.1–1.5)
BUN SERPL-MCNC: 14 MG/DL (ref 8–22)
CALCIUM SERPL-MCNC: 8.8 MG/DL (ref 8.5–10.5)
CHLORIDE SERPL-SCNC: 109 MMOL/L (ref 96–112)
CO2 SERPL-SCNC: 23 MMOL/L (ref 20–33)
CREAT SERPL-MCNC: 0.75 MG/DL (ref 0.5–1.4)
EKG IMPRESSION: NORMAL
GLOBULIN SER CALC-MCNC: 3.1 G/DL (ref 1.9–3.5)
GLUCOSE SERPL-MCNC: 127 MG/DL (ref 65–99)
MAGNESIUM SERPL-MCNC: 1.7 MG/DL (ref 1.5–2.5)
PHOSPHATE SERPL-MCNC: 1.7 MG/DL (ref 2.5–4.5)
POTASSIUM SERPL-SCNC: 4.6 MMOL/L (ref 3.6–5.5)
PROT SERPL-MCNC: 6.7 G/DL (ref 6–8.2)
SODIUM SERPL-SCNC: 136 MMOL/L (ref 135–145)

## 2018-07-20 PROCEDURE — 82140 ASSAY OF AMMONIA: CPT

## 2018-07-20 PROCEDURE — A9270 NON-COVERED ITEM OR SERVICE: HCPCS | Performed by: INTERNAL MEDICINE

## 2018-07-20 PROCEDURE — 700101 HCHG RX REV CODE 250: Performed by: HOSPITALIST

## 2018-07-20 PROCEDURE — 700102 HCHG RX REV CODE 250 W/ 637 OVERRIDE(OP): Performed by: HOSPITALIST

## 2018-07-20 PROCEDURE — 93010 ELECTROCARDIOGRAM REPORT: CPT | Performed by: INTERNAL MEDICINE

## 2018-07-20 PROCEDURE — 84100 ASSAY OF PHOSPHORUS: CPT

## 2018-07-20 PROCEDURE — 99232 SBSQ HOSP IP/OBS MODERATE 35: CPT | Performed by: INTERNAL MEDICINE

## 2018-07-20 PROCEDURE — 83735 ASSAY OF MAGNESIUM: CPT

## 2018-07-20 PROCEDURE — A9270 NON-COVERED ITEM OR SERVICE: HCPCS | Performed by: HOSPITALIST

## 2018-07-20 PROCEDURE — 700102 HCHG RX REV CODE 250 W/ 637 OVERRIDE(OP): Performed by: INTERNAL MEDICINE

## 2018-07-20 PROCEDURE — 770006 HCHG ROOM/CARE - MED/SURG/GYN SEMI*

## 2018-07-20 PROCEDURE — 36415 COLL VENOUS BLD VENIPUNCTURE: CPT

## 2018-07-20 PROCEDURE — 93005 ELECTROCARDIOGRAM TRACING: CPT | Performed by: HOSPITALIST

## 2018-07-20 RX ORDER — OXYCODONE HYDROCHLORIDE 5 MG/1
5 TABLET ORAL EVERY 4 HOURS PRN
Status: DISCONTINUED | OUTPATIENT
Start: 2018-07-20 | End: 2018-07-23 | Stop reason: HOSPADM

## 2018-07-20 RX ORDER — LOPERAMIDE HYDROCHLORIDE 2 MG/1
2 CAPSULE ORAL 4 TIMES DAILY PRN
Status: DISCONTINUED | OUTPATIENT
Start: 2018-07-20 | End: 2018-07-23 | Stop reason: HOSPADM

## 2018-07-20 RX ADMIN — THERA TABS 1 TABLET: TAB at 05:19

## 2018-07-20 RX ADMIN — IBUPROFEN 800 MG: 800 TABLET, FILM COATED ORAL at 00:27

## 2018-07-20 RX ADMIN — LOPERAMIDE HYDROCHLORIDE 2 MG: 2 CAPSULE ORAL at 09:24

## 2018-07-20 RX ADMIN — LORAZEPAM 2 MG: 1 TABLET ORAL at 00:28

## 2018-07-20 RX ADMIN — LORAZEPAM 1 MG: 1 TABLET ORAL at 22:17

## 2018-07-20 RX ADMIN — LORAZEPAM 1 MG: 1 TABLET ORAL at 18:02

## 2018-07-20 RX ADMIN — NYSTATIN 500000 UNITS: 100000 SUSPENSION ORAL at 18:02

## 2018-07-20 RX ADMIN — FOLIC ACID 1 MG: 1 TABLET ORAL at 05:20

## 2018-07-20 RX ADMIN — LORAZEPAM 2 MG: 1 TABLET ORAL at 09:21

## 2018-07-20 RX ADMIN — MAGNESIUM GLUCONATE 500 MG ORAL TABLET 400 MG: 500 TABLET ORAL at 05:19

## 2018-07-20 RX ADMIN — OXYCODONE HYDROCHLORIDE 5 MG: 5 TABLET ORAL at 19:44

## 2018-07-20 RX ADMIN — POTASSIUM CHLORIDE AND SODIUM CHLORIDE: 900; 150 INJECTION, SOLUTION INTRAVENOUS at 18:03

## 2018-07-20 RX ADMIN — FLUOXETINE HYDROCHLORIDE 40 MG: 20 CAPSULE ORAL at 05:19

## 2018-07-20 RX ADMIN — OXYCODONE HYDROCHLORIDE 5 MG: 5 TABLET ORAL at 15:22

## 2018-07-20 RX ADMIN — CHLORDIAZEPOXIDE HYDROCHLORIDE 25 MG: 25 CAPSULE ORAL at 12:12

## 2018-07-20 RX ADMIN — POTASSIUM CHLORIDE 20 MEQ: 1500 TABLET, EXTENDED RELEASE ORAL at 05:21

## 2018-07-20 RX ADMIN — CHLORDIAZEPOXIDE HYDROCHLORIDE 25 MG: 25 CAPSULE ORAL at 00:28

## 2018-07-20 RX ADMIN — MAGNESIUM GLUCONATE 500 MG ORAL TABLET 400 MG: 500 TABLET ORAL at 18:02

## 2018-07-20 RX ADMIN — LORAZEPAM 2 MG: 1 TABLET ORAL at 05:20

## 2018-07-20 RX ADMIN — NICOTINE 14 MG: 14 PATCH, EXTENDED RELEASE TRANSDERMAL at 05:21

## 2018-07-20 RX ADMIN — CHLORDIAZEPOXIDE HYDROCHLORIDE 25 MG: 25 CAPSULE ORAL at 05:21

## 2018-07-20 RX ADMIN — CHLORDIAZEPOXIDE HYDROCHLORIDE 25 MG: 25 CAPSULE ORAL at 18:02

## 2018-07-20 RX ADMIN — THIAMINE HCL TAB 100 MG 100 MG: 100 TAB at 05:20

## 2018-07-20 RX ADMIN — POTASSIUM CHLORIDE 20 MEQ: 1500 TABLET, EXTENDED RELEASE ORAL at 18:02

## 2018-07-20 RX ADMIN — OMEPRAZOLE 20 MG: 20 CAPSULE, DELAYED RELEASE ORAL at 05:19

## 2018-07-20 RX ADMIN — LORAZEPAM 1 MG: 1 TABLET ORAL at 14:06

## 2018-07-20 ASSESSMENT — LIFESTYLE VARIABLES
TREMOR: *
NAUSEA AND VOMITING: NO NAUSEA AND NO VOMITING
AGITATION: NORMAL ACTIVITY
AGITATION: NORMAL ACTIVITY
AUDITORY DISTURBANCES: VERY MILD HARSHNESS OR ABILITY TO FRIGHTEN
ORIENTATION AND CLOUDING OF SENSORIUM: ORIENTED AND CAN DO SERIAL ADDITIONS
ANXIETY: *
PAROXYSMAL SWEATS: BARELY PERCEPTIBLE SWEATING. PALMS MOIST
PAROXYSMAL SWEATS: BARELY PERCEPTIBLE SWEATING. PALMS MOIST
ORIENTATION AND CLOUDING OF SENSORIUM: ORIENTED AND CAN DO SERIAL ADDITIONS
VISUAL DISTURBANCES: VERY MILD SENSITIVITY
AUDITORY DISTURBANCES: NOT PRESENT
NAUSEA AND VOMITING: NO NAUSEA AND NO VOMITING
TOTAL SCORE: 7
HEADACHE, FULLNESS IN HEAD: VERY MILD
TOTAL SCORE: 7
ORIENTATION AND CLOUDING OF SENSORIUM: ORIENTED AND CAN DO SERIAL ADDITIONS
TOTAL SCORE: 8
TREMOR: MODERATE TREMOR WITH ARMS EXTENDED
PAROXYSMAL SWEATS: BARELY PERCEPTIBLE SWEATING. PALMS MOIST
TREMOR: *
ANXIETY: MILDLY ANXIOUS
HEADACHE, FULLNESS IN HEAD: MILD
AUDITORY DISTURBANCES: VERY MILD HARSHNESS OR ABILITY TO FRIGHTEN
AGITATION: NORMAL ACTIVITY
VISUAL DISTURBANCES: NOT PRESENT
ANXIETY: *
ORIENTATION AND CLOUDING OF SENSORIUM: ORIENTED AND CAN DO SERIAL ADDITIONS
TOTAL SCORE: 10
NAUSEA AND VOMITING: NO NAUSEA AND NO VOMITING
TREMOR: MODERATE TREMOR WITH ARMS EXTENDED
PAROXYSMAL SWEATS: BARELY PERCEPTIBLE SWEATING. PALMS MOIST
HEADACHE, FULLNESS IN HEAD: VERY MILD
AGITATION: NORMAL ACTIVITY
ANXIETY: MILDLY ANXIOUS
AUDITORY DISTURBANCES: NOT PRESENT
PAROXYSMAL SWEATS: BARELY PERCEPTIBLE SWEATING. PALMS MOIST
ANXIETY: MILDLY ANXIOUS
HEADACHE, FULLNESS IN HEAD: VERY MILD
HEADACHE, FULLNESS IN HEAD: VERY MILD
TREMOR: *
AGITATION: NORMAL ACTIVITY
VISUAL DISTURBANCES: VERY MILD SENSITIVITY
VISUAL DISTURBANCES: NOT PRESENT
VISUAL DISTURBANCES: NOT PRESENT
AUDITORY DISTURBANCES: NOT PRESENT
NAUSEA AND VOMITING: NO NAUSEA AND NO VOMITING
TOTAL SCORE: 7
NAUSEA AND VOMITING: NO NAUSEA AND NO VOMITING
ORIENTATION AND CLOUDING OF SENSORIUM: ORIENTED AND CAN DO SERIAL ADDITIONS

## 2018-07-20 ASSESSMENT — PAIN SCALES - GENERAL
PAINLEVEL_OUTOF10: 7
PAINLEVEL_OUTOF10: 3
PAINLEVEL_OUTOF10: 4
PAINLEVEL_OUTOF10: 7
PAINLEVEL_OUTOF10: 6

## 2018-07-20 NOTE — CARE PLAN
Problem: Safety  Goal: Will remain free from injury  Outcome: PROGRESSING AS EXPECTED  Patient remains free from falls. Safety precautions in place.    Problem: Pain Management  Goal: Pain level will decrease to patient's comfort goal  Outcome: PROGRESSING AS EXPECTED  Patient medicated with tylenol for headache and right knee pain on shift.

## 2018-07-20 NOTE — CARE PLAN
Problem: Safety  Goal: Will remain free from injury  Outcome: PROGRESSING AS EXPECTED  Pt a high risk fall at this time. Now pt is aaox4 and calling appropriately. Pt getting up to the restroom x1 assist. Educated on fall risk. Pt verbally understanding. Will re evaluate fall risk level.     Problem: Fluid Volume:  Goal: Will maintain balanced intake and output    Intervention: Monitor, educate, and encourage compliance with therapeutic intake of liquids  Pt came in with dehydration. Continuous drip. POC discussed with pt. And pt agreeable. Questions answered at bedside.

## 2018-07-20 NOTE — PROGRESS NOTES
Patient is alert and oriented. Vitals stable. Tylenol administered for knee and headache on this shift. CIWA every 4 hours and ativan administered as needed for MAR. Up to bathroom with one assist. IVF infusing. Safety precautions in place. Bed alarm active and audible, call bell within reach, nonskid footwear in use, hourly rounding performed.
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
## 2018-07-20 NOTE — PROGRESS NOTES
Assumed care of pt at 1930. Report received and bedside rounding completed with day RN. Pt in bed. No SOB, or in any acute distress. c/o pain in RLE. MD notified PRN ordered.  Fall precautions in place,  bed alarm. - Treaded non slip socks. Call light and pt belongings within reach - pt makes needs known - hourly rounding in place. See flowsheets for further assessment.

## 2018-07-20 NOTE — CARE PLAN
Problem: Respiratory:  Goal: Respiratory status will improve    Intervention: Administer and titrate oxygen therapy  On 2L via nasal cannula at 99%, no O2 at baseline. Titrating down as appropriate.       Problem: Pain Management  Goal: Pain level will decrease to patient's comfort goal    Intervention: Follow pain managment plan developed in collaboration with patient and Interdisciplinary Team  Right lower extremity pain managed with PRN Ibuprofen. Patient reports adequate relief.

## 2018-07-20 NOTE — PROGRESS NOTES
Assumed patient care at 0700. Received report from night shift. Assessment completed. A&Ox4,  Pt states pain 3/10 to right lower extremity, declines intervention, no s/s of distress. On 2L via nasal cannula, titrated to 1L. Q4h CIWA scores. Fall precautions in place;  Bed alarm on, bed locked in lowest position, personal possessions and call light placed within reach. POC discussed with pt, communication board updated. Pt denies any additional needs at this time.

## 2018-07-21 ENCOUNTER — APPOINTMENT (OUTPATIENT)
Dept: RADIOLOGY | Facility: MEDICAL CENTER | Age: 58
DRG: 897 | End: 2018-07-21
Attending: INTERNAL MEDICINE
Payer: MEDICAID

## 2018-07-21 PROBLEM — G89.29 CHRONIC PAIN: Status: ACTIVE | Noted: 2018-07-21

## 2018-07-21 LAB
AMMONIA PLAS-SCNC: 45 UMOL/L (ref 11–45)
EKG IMPRESSION: NORMAL
LV EJECT FRACT  99904: 60
LV EJECT FRACT MOD 2C 99903: 65.41
LV EJECT FRACT MOD 4C 99902: 56.64
LV EJECT FRACT MOD BP 99901: 61.42
MAGNESIUM SERPL-MCNC: 1.5 MG/DL (ref 1.5–2.5)
PHOSPHATE SERPL-MCNC: 2.7 MG/DL (ref 2.5–4.5)

## 2018-07-21 PROCEDURE — 700102 HCHG RX REV CODE 250 W/ 637 OVERRIDE(OP): Performed by: INTERNAL MEDICINE

## 2018-07-21 PROCEDURE — 72100 X-RAY EXAM L-S SPINE 2/3 VWS: CPT

## 2018-07-21 PROCEDURE — 99232 SBSQ HOSP IP/OBS MODERATE 35: CPT | Performed by: INTERNAL MEDICINE

## 2018-07-21 PROCEDURE — A9270 NON-COVERED ITEM OR SERVICE: HCPCS | Performed by: INTERNAL MEDICINE

## 2018-07-21 PROCEDURE — 700102 HCHG RX REV CODE 250 W/ 637 OVERRIDE(OP): Performed by: HOSPITALIST

## 2018-07-21 PROCEDURE — A9270 NON-COVERED ITEM OR SERVICE: HCPCS | Performed by: HOSPITALIST

## 2018-07-21 PROCEDURE — 700101 HCHG RX REV CODE 250: Performed by: HOSPITALIST

## 2018-07-21 PROCEDURE — 93306 TTE W/DOPPLER COMPLETE: CPT | Mod: 26 | Performed by: INTERNAL MEDICINE

## 2018-07-21 PROCEDURE — 93005 ELECTROCARDIOGRAM TRACING: CPT | Performed by: HOSPITALIST

## 2018-07-21 PROCEDURE — 770006 HCHG ROOM/CARE - MED/SURG/GYN SEMI*

## 2018-07-21 PROCEDURE — 93306 TTE W/DOPPLER COMPLETE: CPT

## 2018-07-21 PROCEDURE — 93010 ELECTROCARDIOGRAM REPORT: CPT | Performed by: INTERNAL MEDICINE

## 2018-07-21 RX ORDER — OXYCODONE HYDROCHLORIDE 5 MG/1
5 TABLET ORAL
Status: DISCONTINUED | OUTPATIENT
Start: 2018-07-21 | End: 2018-07-23 | Stop reason: HOSPADM

## 2018-07-21 RX ADMIN — OXYCODONE HYDROCHLORIDE 5 MG: 5 TABLET ORAL at 17:48

## 2018-07-21 RX ADMIN — LORAZEPAM 0.5 MG: 0.5 TABLET ORAL at 22:44

## 2018-07-21 RX ADMIN — OXYCODONE HYDROCHLORIDE 5 MG: 5 TABLET ORAL at 00:12

## 2018-07-21 RX ADMIN — CHLORDIAZEPOXIDE HYDROCHLORIDE 25 MG: 25 CAPSULE ORAL at 00:12

## 2018-07-21 RX ADMIN — OXYCODONE HYDROCHLORIDE 5 MG: 5 TABLET ORAL at 12:33

## 2018-07-21 RX ADMIN — OXYCODONE HYDROCHLORIDE 5 MG: 5 TABLET ORAL at 22:31

## 2018-07-21 RX ADMIN — NYSTATIN 500000 UNITS: 100000 SUSPENSION ORAL at 20:22

## 2018-07-21 RX ADMIN — POTASSIUM CHLORIDE 20 MEQ: 1500 TABLET, EXTENDED RELEASE ORAL at 17:47

## 2018-07-21 RX ADMIN — NICOTINE 14 MG: 14 PATCH, EXTENDED RELEASE TRANSDERMAL at 05:14

## 2018-07-21 RX ADMIN — FLUOXETINE HYDROCHLORIDE 40 MG: 20 CAPSULE ORAL at 05:13

## 2018-07-21 RX ADMIN — LORAZEPAM 0.5 MG: 0.5 TABLET ORAL at 12:33

## 2018-07-21 RX ADMIN — THERA TABS 1 TABLET: TAB at 05:11

## 2018-07-21 RX ADMIN — POTASSIUM CHLORIDE AND SODIUM CHLORIDE: 900; 150 INJECTION, SOLUTION INTRAVENOUS at 22:26

## 2018-07-21 RX ADMIN — OMEPRAZOLE 20 MG: 20 CAPSULE, DELAYED RELEASE ORAL at 05:12

## 2018-07-21 RX ADMIN — LORAZEPAM 0.5 MG: 0.5 TABLET ORAL at 17:52

## 2018-07-21 RX ADMIN — CHLORDIAZEPOXIDE HYDROCHLORIDE 25 MG: 25 CAPSULE ORAL at 05:12

## 2018-07-21 RX ADMIN — FOLIC ACID 1 MG: 1 TABLET ORAL at 05:13

## 2018-07-21 RX ADMIN — OXYCODONE HYDROCHLORIDE 5 MG: 5 TABLET ORAL at 08:17

## 2018-07-21 RX ADMIN — MAGNESIUM GLUCONATE 500 MG ORAL TABLET 400 MG: 500 TABLET ORAL at 05:12

## 2018-07-21 RX ADMIN — THIAMINE HCL TAB 100 MG 100 MG: 100 TAB at 05:13

## 2018-07-21 RX ADMIN — NYSTATIN 500000 UNITS: 100000 SUSPENSION ORAL at 12:13

## 2018-07-21 RX ADMIN — NYSTATIN 500000 UNITS: 100000 SUSPENSION ORAL at 17:48

## 2018-07-21 RX ADMIN — MAGNESIUM GLUCONATE 500 MG ORAL TABLET 400 MG: 500 TABLET ORAL at 17:47

## 2018-07-21 RX ADMIN — LORAZEPAM 2 MG: 1 TABLET ORAL at 03:29

## 2018-07-21 RX ADMIN — NYSTATIN 500000 UNITS: 100000 SUSPENSION ORAL at 05:14

## 2018-07-21 RX ADMIN — CHLORDIAZEPOXIDE HYDROCHLORIDE 25 MG: 25 CAPSULE ORAL at 12:13

## 2018-07-21 RX ADMIN — POTASSIUM CHLORIDE 20 MEQ: 1500 TABLET, EXTENDED RELEASE ORAL at 05:14

## 2018-07-21 ASSESSMENT — LIFESTYLE VARIABLES
TREMOR: *
AUDITORY DISTURBANCES: NOT PRESENT
DOES PATIENT WANT TO TALK TO SOMEONE ABOUT QUITTING: YES
ALCOHOL_USE: YES
HAVE YOU EVER FELT YOU SHOULD CUT DOWN ON YOUR DRINKING: YES
VISUAL DISTURBANCES: NOT PRESENT
AGITATION: NORMAL ACTIVITY
ORIENTATION AND CLOUDING OF SENSORIUM: ORIENTED AND CAN DO SERIAL ADDITIONS
NAUSEA AND VOMITING: MILD NAUSEA WITH NO VOMITING
DOES PATIENT WANT TO STOP DRINKING: YES
AUDITORY DISTURBANCES: NOT PRESENT
HAVE PEOPLE ANNOYED YOU BY CRITICIZING YOUR DRINKING: YES
NAUSEA AND VOMITING: NO NAUSEA AND NO VOMITING
ON A TYPICAL DAY WHEN YOU DRINK ALCOHOL HOW MANY DRINKS DO YOU HAVE: 10
AGITATION: NORMAL ACTIVITY
TREMOR: *
HAVE PEOPLE ANNOYED YOU BY CRITICIZING YOUR DRINKING: YES
TOTAL SCORE: 5
PAROXYSMAL SWEATS: BARELY PERCEPTIBLE SWEATING. PALMS MOIST
VISUAL DISTURBANCES: NOT PRESENT
AGITATION: NORMAL ACTIVITY
HEADACHE, FULLNESS IN HEAD: NOT PRESENT
PAROXYSMAL SWEATS: BARELY PERCEPTIBLE SWEATING. PALMS MOIST
HAVE YOU EVER FELT YOU SHOULD CUT DOWN ON YOUR DRINKING: YES
ON A TYPICAL DAY WHEN YOU DRINK ALCOHOL HOW MANY DRINKS DO YOU HAVE: 10
ANXIETY: MODERATELY ANXIOUS OR GUARDED, SO ANXIETY IS INFERRED
PAROXYSMAL SWEATS: BARELY PERCEPTIBLE SWEATING. PALMS MOIST
HEADACHE, FULLNESS IN HEAD: VERY MILD
AUDITORY DISTURBANCES: NOT PRESENT
VISUAL DISTURBANCES: NOT PRESENT
HEADACHE, FULLNESS IN HEAD: MILD
TOTAL SCORE: 4
TOTAL SCORE: 4
PAROXYSMAL SWEATS: *
ORIENTATION AND CLOUDING OF SENSORIUM: ORIENTED AND CAN DO SERIAL ADDITIONS
ANXIETY: *
AUDITORY DISTURBANCES: NOT PRESENT
TOTAL SCORE: 5
ANXIETY: *
AUDITORY DISTURBANCES: NOT PRESENT
HOW MANY TIMES IN THE PAST YEAR HAVE YOU HAD 5 OR MORE DRINKS IN A DAY: 365
NAUSEA AND VOMITING: NO NAUSEA AND NO VOMITING
TOTAL SCORE: 12
TOTAL SCORE: 7
HEADACHE, FULLNESS IN HEAD: NOT PRESENT
TOTAL SCORE: 4
VISUAL DISTURBANCES: NOT PRESENT
AGITATION: NORMAL ACTIVITY
ORIENTATION AND CLOUDING OF SENSORIUM: ORIENTED AND CAN DO SERIAL ADDITIONS
TREMOR: *
ORIENTATION AND CLOUDING OF SENSORIUM: ORIENTED AND CAN DO SERIAL ADDITIONS
HEADACHE, FULLNESS IN HEAD: VERY MILD
AGITATION: NORMAL ACTIVITY
ALCOHOL_USE: YES
HEADACHE, FULLNESS IN HEAD: NOT PRESENT
TOTAL SCORE: 4
VISUAL DISTURBANCES: NOT PRESENT
AGITATION: NORMAL ACTIVITY
TREMOR: *
AUDITORY DISTURBANCES: NOT PRESENT
NAUSEA AND VOMITING: MILD NAUSEA WITH NO VOMITING
ORIENTATION AND CLOUDING OF SENSORIUM: ORIENTED AND CAN DO SERIAL ADDITIONS
ANXIETY: *
PAROXYSMAL SWEATS: BARELY PERCEPTIBLE SWEATING. PALMS MOIST
EVER HAD A DRINK FIRST THING IN THE MORNING TO STEADY YOUR NERVES TO GET RID OF A HANGOVER: YES
TOTAL SCORE: 7
AGITATION: NORMAL ACTIVITY
ORIENTATION AND CLOUDING OF SENSORIUM: ORIENTED AND CAN DO SERIAL ADDITIONS
CONSUMPTION TOTAL: POSITIVE
PAROXYSMAL SWEATS: BARELY PERCEPTIBLE SWEATING. PALMS MOIST
ANXIETY: *
TOTAL SCORE: 7
CONSUMPTION TOTAL: INCOMPLETE
EVER FELT BAD OR GUILTY ABOUT YOUR DRINKING: YES
VISUAL DISTURBANCES: NOT PRESENT
TOTAL SCORE: 7
TREMOR: *
ANXIETY: MILDLY ANXIOUS
EVER HAD A DRINK FIRST THING IN THE MORNING TO STEADY YOUR NERVES TO GET RID OF A HANGOVER: YES
VISUAL DISTURBANCES: NOT PRESENT
TREMOR: *
EVER FELT BAD OR GUILTY ABOUT YOUR DRINKING: YES
HEADACHE, FULLNESS IN HEAD: NOT PRESENT
AUDITORY DISTURBANCES: NOT PRESENT
NAUSEA AND VOMITING: NO NAUSEA AND NO VOMITING
TOTAL SCORE: 4
ANXIETY: MILDLY ANXIOUS
NAUSEA AND VOMITING: NO NAUSEA AND NO VOMITING
PAROXYSMAL SWEATS: BARELY PERCEPTIBLE SWEATING. PALMS MOIST
TOTAL SCORE: 4
AVERAGE NUMBER OF DAYS PER WEEK YOU HAVE A DRINK CONTAINING ALCOHOL: 7
TREMOR: *
ORIENTATION AND CLOUDING OF SENSORIUM: ORIENTED AND CAN DO SERIAL ADDITIONS
NAUSEA AND VOMITING: NO NAUSEA AND NO VOMITING

## 2018-07-21 ASSESSMENT — PAIN SCALES - GENERAL
PAINLEVEL_OUTOF10: 2
PAINLEVEL_OUTOF10: 7
PAINLEVEL_OUTOF10: 0
PAINLEVEL_OUTOF10: 7
PAINLEVEL_OUTOF10: 2
PAINLEVEL_OUTOF10: 7

## 2018-07-21 NOTE — PROGRESS NOTES
Assumed care of pt at 1930. Report received and bedside rounding completed with day RN. No SOB, or in any acute distress. Fall precautions in place,  bed alarm. - Treaded non slip socks. Call light and pt belongings within reach - pt makes needs known - hourly rounding in place. See flowsheets for further assessment.

## 2018-07-21 NOTE — CARE PLAN
Problem: Knowledge Deficit  Goal: Knowledge of disease process/condition, treatment plan, diagnostic tests, and medications will improve    Intervention: Assess knowledge level of disease process/condition, treatment plan, diagnostic tests, and medications  POC discussed with pt. Pt verbalizes wanting to quit drinking and get sober. Pt with high spirits. SW involved.       Problem: Pain Management  Goal: Pain level will decrease to patient's comfort goal  PRN medications given per MAR with good relief.

## 2018-07-21 NOTE — PROGRESS NOTES
[facility]    Date of Service: 2018    Chief Complaint  57 y.o. female admitted 2018 with Unresponsive (pt responsive only to painful stimuli upon EMS arrival ) and Alcohol Intoxication (pt admits to alcohol intoxication but doesn't state how much; denies drug use)    Interval Problem Update  She was intoxicated with alcohol and was found to be hypotensive on admission. Her blood pressure medications were held and she was given IVF. Her hemodynamics improved but she started having tremors the next day. CIWA protocol initiated.     . She was tremulous when I saw her but mentation is now baseline. She understands and wants to be detoxed.  . No fracture or abnormality on R knee. She is asking for pain medications. Still slightly tremulous.  . Less tremulous but she keeps asking for pain medications. Now she says she has \"stenosis\" although initially she points pain to her flanks.     Consultants/Specialty      Disposition  Home when better after detox        Review of Systems   Unable to perform ROS: Mental acuity   Also unreliable   Physical Exam  Laboratory/Imaging   Hemodynamics  Temp (24hrs), Av.2 °C (97.2 °F), Min:36.1 °C (96.9 °F), Max:36.3 °C (97.3 °F)   Temperature: 36.3 °C (97.3 °F)  Pulse  Av.2  Min: 70  Max: 105    Blood Pressure: 131/76      Respiratory      Respiration: 20, Pulse Oximetry: 98 %        RUL Breath Sounds: Clear, RML Breath Sounds: Clear, RLL Breath Sounds: Clear, NOREEN Breath Sounds: Clear, LLL Breath Sounds: Clear    Fluids    Intake/Output Summary (Last 24 hours) at 18 1527  Last data filed at 18 0953   Gross per 24 hour   Intake             1740 ml   Output                0 ml   Net             1740 ml       Nutrition  Orders Placed This Encounter   Procedures   • Diet Order Regular     Standing Status:   Standing     Number of Occurrences:   1     Order Specific Question:   Diet:     Answer:   Regular [1]     Physical Exam "   Constitutional: She appears well-developed and well-nourished.   HENT:   Head: Normocephalic and atraumatic.   Eyes: Conjunctivae and EOM are normal. No scleral icterus.   Neck: Normal range of motion. Neck supple.   Cardiovascular: Normal rate and regular rhythm.  Exam reveals no gallop and no friction rub.    No murmur heard.  Slight tachycardia at times   Pulmonary/Chest: Effort normal and breath sounds normal. No respiratory distress. She has no wheezes. She has no rales.   Abdominal: Soft. Bowel sounds are normal. She exhibits no distension. There is no tenderness. There is no rebound and no guarding.   Musculoskeletal: She exhibits no edema or tenderness.   Neurological: She is alert. Coordination (Tremulous improved) abnormal.   Mild cognitive deficits unchanged   Skin: Skin is warm.   Psychiatric: She has a normal mood and affect. Her behavior is normal.       Recent Labs      07/19/18   0024   WBC  6.4   RBC  3.43*   HEMOGLOBIN  10.4*   HEMATOCRIT  32.7*   MCV  95.3   MCH  30.3   MCHC  31.8*   RDW  66.2*   PLATELETCT  190   MPV  9.6     Recent Labs      07/19/18   0024  07/19/18   2348   SODIUM  142  136   POTASSIUM  3.9  4.6   CHLORIDE  114*  109   CO2  18*  23   GLUCOSE  84  127*   BUN  26*  14   CREATININE  1.05  0.75   CALCIUM  7.6*  8.8                      Assessment/Plan     * Alcohol intoxication with delirium and withdrawal (HCC)- (present on admission)   Assessment & Plan    Patient has recurrent episodes of alcohol intoxication.   In the meantime patient will be given thiamine folic acid multivitamin.  CIWA protocol if withdrawing  CIWA initiated now as she is withdrawing  Improving        Dehydration, severe   Assessment & Plan    Patient's dehydration is most likely secondary to alcohol intoxication.  Continue at this point with IV fluid resuscitation and monitor at this point sodium levels as well as blood pressure and hydration status.  Improved        Acute kidney injury (HCC)   Assessment  & Plan    Acute renal failure secondary to prerenal azotemia where the patient's dehydration is caused by alcoholic intoxication.  Monitor this when renal functions with hydration.  Resolved.        Hypokalemia- (present on admission)   Assessment & Plan    Potassium level was low at 3.5 by Will supplement potassium with oral potassium at this point.  Resolved        Chronic pain   Assessment & Plan    Addressed her pain conplaints  L spine XR pening  Knee Xrs showed no acute osseous abnormality  Educated her on narcotic use        Hypertension- (present on admission)   Assessment & Plan    Restart blood pressure medicines        Hypotension- (present on admission)   Assessment & Plan    Hypotension is secondary to the dehydration. At this point the patient will need continued hydration to get her blood pressure back to normal.  Resolved, hypertensive now  Restart blood pressure medicines and treat if withdrawing        Normocytic anemia- (present on admission)   Assessment & Plan    Mild  No active bleeding  Trend H/H  Iron panel B12/folate studies        Depression- (present on admission)   Assessment & Plan    Continue at this point with Prozac.        GERD (gastroesophageal reflux disease)- (present on admission)   Assessment & Plan    Continue at this point with PPI.              Quality-Core Measures

## 2018-07-22 PROBLEM — K08.89 TOOTH ACHE: Status: ACTIVE | Noted: 2018-07-22

## 2018-07-22 PROBLEM — Z76.5 DRUG-SEEKING BEHAVIOR: Status: ACTIVE | Noted: 2018-07-22

## 2018-07-22 PROCEDURE — 99233 SBSQ HOSP IP/OBS HIGH 50: CPT | Performed by: INTERNAL MEDICINE

## 2018-07-22 PROCEDURE — 700102 HCHG RX REV CODE 250 W/ 637 OVERRIDE(OP): Performed by: INTERNAL MEDICINE

## 2018-07-22 PROCEDURE — 97161 PT EVAL LOW COMPLEX 20 MIN: CPT

## 2018-07-22 PROCEDURE — G8979 MOBILITY GOAL STATUS: HCPCS | Mod: CI

## 2018-07-22 PROCEDURE — A9270 NON-COVERED ITEM OR SERVICE: HCPCS | Performed by: INTERNAL MEDICINE

## 2018-07-22 PROCEDURE — 700102 HCHG RX REV CODE 250 W/ 637 OVERRIDE(OP): Performed by: HOSPITALIST

## 2018-07-22 PROCEDURE — G8978 MOBILITY CURRENT STATUS: HCPCS | Mod: CI

## 2018-07-22 PROCEDURE — 700101 HCHG RX REV CODE 250: Performed by: HOSPITALIST

## 2018-07-22 PROCEDURE — 770006 HCHG ROOM/CARE - MED/SURG/GYN SEMI*

## 2018-07-22 PROCEDURE — A9270 NON-COVERED ITEM OR SERVICE: HCPCS | Performed by: HOSPITALIST

## 2018-07-22 PROCEDURE — G8980 MOBILITY D/C STATUS: HCPCS | Mod: CI

## 2018-07-22 RX ORDER — ACETAMINOPHEN 325 MG/1
650 TABLET ORAL EVERY 6 HOURS PRN
Qty: 30 TAB | Refills: 0 | Status: SHIPPED | OUTPATIENT
Start: 2018-07-22 | End: 2018-10-02

## 2018-07-22 RX ORDER — LOPERAMIDE HYDROCHLORIDE 2 MG/1
2 CAPSULE ORAL 4 TIMES DAILY PRN
Qty: 30 CAP | COMMUNITY
Start: 2018-07-22 | End: 2018-10-02

## 2018-07-22 RX ORDER — OXYCODONE HYDROCHLORIDE 5 MG/1
5 TABLET ORAL EVERY 8 HOURS PRN
Qty: 10 TAB | Refills: 0 | Status: SHIPPED | OUTPATIENT
Start: 2018-07-22 | End: 2018-07-25

## 2018-07-22 RX ORDER — IBUPROFEN 800 MG/1
800 TABLET ORAL EVERY 6 HOURS PRN
Qty: 30 TAB | COMMUNITY
Start: 2018-07-22 | End: 2018-10-02

## 2018-07-22 RX ADMIN — NICOTINE 14 MG: 14 PATCH, EXTENDED RELEASE TRANSDERMAL at 03:52

## 2018-07-22 RX ADMIN — POTASSIUM CHLORIDE AND SODIUM CHLORIDE: 900; 150 INJECTION, SOLUTION INTRAVENOUS at 06:03

## 2018-07-22 RX ADMIN — NYSTATIN 500000 UNITS: 100000 SUSPENSION ORAL at 17:16

## 2018-07-22 RX ADMIN — NYSTATIN 500000 UNITS: 100000 SUSPENSION ORAL at 12:10

## 2018-07-22 RX ADMIN — MAGNESIUM GLUCONATE 500 MG ORAL TABLET 400 MG: 500 TABLET ORAL at 03:54

## 2018-07-22 RX ADMIN — MAGNESIUM GLUCONATE 500 MG ORAL TABLET 400 MG: 500 TABLET ORAL at 17:16

## 2018-07-22 RX ADMIN — POTASSIUM CHLORIDE 20 MEQ: 1500 TABLET, EXTENDED RELEASE ORAL at 17:16

## 2018-07-22 RX ADMIN — OXYCODONE HYDROCHLORIDE 5 MG: 5 TABLET ORAL at 06:27

## 2018-07-22 RX ADMIN — NYSTATIN 500000 UNITS: 100000 SUSPENSION ORAL at 20:13

## 2018-07-22 RX ADMIN — BENZOCAINE: 100 GEL TOPICAL at 20:17

## 2018-07-22 RX ADMIN — LORAZEPAM 0.5 MG: 0.5 TABLET ORAL at 06:28

## 2018-07-22 RX ADMIN — BENZOCAINE: 100 GEL TOPICAL at 12:11

## 2018-07-22 RX ADMIN — NYSTATIN 500000 UNITS: 100000 SUSPENSION ORAL at 03:57

## 2018-07-22 RX ADMIN — OXYCODONE HYDROCHLORIDE 5 MG: 5 TABLET ORAL at 14:51

## 2018-07-22 RX ADMIN — OMEPRAZOLE 20 MG: 20 CAPSULE, DELAYED RELEASE ORAL at 03:56

## 2018-07-22 RX ADMIN — POTASSIUM CHLORIDE 20 MEQ: 1500 TABLET, EXTENDED RELEASE ORAL at 03:56

## 2018-07-22 RX ADMIN — LORAZEPAM 0.5 MG: 0.5 TABLET ORAL at 17:17

## 2018-07-22 RX ADMIN — FLUOXETINE HYDROCHLORIDE 40 MG: 20 CAPSULE ORAL at 03:54

## 2018-07-22 RX ADMIN — OXYCODONE HYDROCHLORIDE 5 MG: 5 TABLET ORAL at 20:13

## 2018-07-22 ASSESSMENT — PAIN SCALES - GENERAL
PAINLEVEL_OUTOF10: 2
PAINLEVEL_OUTOF10: 7
PAINLEVEL_OUTOF10: 7
PAINLEVEL_OUTOF10: 8

## 2018-07-22 ASSESSMENT — LIFESTYLE VARIABLES
VISUAL DISTURBANCES: NOT PRESENT
ORIENTATION AND CLOUDING OF SENSORIUM: ORIENTED AND CAN DO SERIAL ADDITIONS
HEADACHE, FULLNESS IN HEAD: MODERATELY SEVERE
TOTAL SCORE: 4
VISUAL DISTURBANCES: NOT PRESENT
NAUSEA AND VOMITING: NO NAUSEA AND NO VOMITING
ANXIETY: MILDLY ANXIOUS
AUDITORY DISTURBANCES: NOT PRESENT
HEADACHE, FULLNESS IN HEAD: NOT PRESENT
AGITATION: NORMAL ACTIVITY
VISUAL DISTURBANCES: NOT PRESENT
TOTAL SCORE: VERY MILD ITCHING, PINS AND NEEDLES SENSATION, BURNING OR NUMBNESS
AGITATION: NORMAL ACTIVITY
TREMOR: NO TREMOR
TOTAL SCORE: 6
NAUSEA AND VOMITING: NO NAUSEA AND NO VOMITING
NAUSEA AND VOMITING: NO NAUSEA AND NO VOMITING
TOTAL SCORE: 1
PAROXYSMAL SWEATS: BARELY PERCEPTIBLE SWEATING. PALMS MOIST
PAROXYSMAL SWEATS: BARELY PERCEPTIBLE SWEATING. PALMS MOIST
AGITATION: NORMAL ACTIVITY
PAROXYSMAL SWEATS: BARELY PERCEPTIBLE SWEATING. PALMS MOIST
HEADACHE, FULLNESS IN HEAD: NOT PRESENT
PAROXYSMAL SWEATS: BARELY PERCEPTIBLE SWEATING. PALMS MOIST
ANXIETY: MILDLY ANXIOUS
ANXIETY: NO ANXIETY (AT EASE)
AUDITORY DISTURBANCES: NOT PRESENT
TOTAL SCORE: VERY MILD ITCHING, PINS AND NEEDLES SENSATION, BURNING OR NUMBNESS
HEADACHE, FULLNESS IN HEAD: VERY MILD
HEADACHE, FULLNESS IN HEAD: VERY MILD
ORIENTATION AND CLOUDING OF SENSORIUM: ORIENTED AND CAN DO SERIAL ADDITIONS
ANXIETY: MILDLY ANXIOUS
VISUAL DISTURBANCES: NOT PRESENT
TOTAL SCORE: VERY MILD ITCHING, PINS AND NEEDLES SENSATION, BURNING OR NUMBNESS
AUDITORY DISTURBANCES: NOT PRESENT
PAROXYSMAL SWEATS: BARELY PERCEPTIBLE SWEATING. PALMS MOIST
AUDITORY DISTURBANCES: NOT PRESENT
ORIENTATION AND CLOUDING OF SENSORIUM: ORIENTED AND CAN DO SERIAL ADDITIONS
TOTAL SCORE: 4
TOTAL SCORE: 5
AUDITORY DISTURBANCES: NOT PRESENT
PAROXYSMAL SWEATS: BARELY PERCEPTIBLE SWEATING. PALMS MOIST
AGITATION: NORMAL ACTIVITY
TREMOR: NO TREMOR
NAUSEA AND VOMITING: NO NAUSEA AND NO VOMITING
AUDITORY DISTURBANCES: NOT PRESENT
TREMOR: NO TREMOR
ANXIETY: MILDLY ANXIOUS
AGITATION: NORMAL ACTIVITY
TREMOR: NO TREMOR
HEADACHE, FULLNESS IN HEAD: MILD
ORIENTATION AND CLOUDING OF SENSORIUM: ORIENTED AND CAN DO SERIAL ADDITIONS
ANXIETY: MILDLY ANXIOUS
NAUSEA AND VOMITING: NO NAUSEA AND NO VOMITING
AGITATION: NORMAL ACTIVITY
VISUAL DISTURBANCES: NOT PRESENT
VISUAL DISTURBANCES: NOT PRESENT
TREMOR: NO TREMOR
ORIENTATION AND CLOUDING OF SENSORIUM: ORIENTED AND CAN DO SERIAL ADDITIONS
TOTAL SCORE: 2
ORIENTATION AND CLOUDING OF SENSORIUM: ORIENTED AND CAN DO SERIAL ADDITIONS
NAUSEA AND VOMITING: NO NAUSEA AND NO VOMITING
TREMOR: NO TREMOR

## 2018-07-22 ASSESSMENT — GAIT ASSESSMENTS
GAIT LEVEL OF ASSIST: SUPERVISED
DISTANCE (FEET): 200
ASSISTIVE DEVICE: QUAD CANE

## 2018-07-22 ASSESSMENT — COGNITIVE AND FUNCTIONAL STATUS - GENERAL
CLIMB 3 TO 5 STEPS WITH RAILING: A LITTLE
MOBILITY SCORE: 22
SUGGESTED CMS G CODE MODIFIER MOBILITY: CJ
WALKING IN HOSPITAL ROOM: A LITTLE
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
## 2018-07-22 NOTE — PROGRESS NOTES
Assumed care at 1900, bedside report received from day shift RN. Pt. Is not on the monitor. Initial assessment completed, orders reviewed, call light within reach, bed alarm in use, and hourly rounding in place. POC addressed with patient, no additional questions at this time.
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
## 2018-07-22 NOTE — ASSESSMENT & PLAN NOTE
Patient will need to be counseled and evaluated, she may need to be set up with outpatient pain management clinic.

## 2018-07-23 VITALS
DIASTOLIC BLOOD PRESSURE: 94 MMHG | WEIGHT: 220 LBS | HEIGHT: 65 IN | BODY MASS INDEX: 36.65 KG/M2 | SYSTOLIC BLOOD PRESSURE: 147 MMHG | HEART RATE: 88 BPM | TEMPERATURE: 97.1 F | OXYGEN SATURATION: 99 % | RESPIRATION RATE: 17 BRPM

## 2018-07-23 LAB
BACTERIA BLD CULT: NORMAL
BACTERIA BLD CULT: NORMAL
SIGNIFICANT IND 70042: NORMAL
SIGNIFICANT IND 70042: NORMAL
SITE SITE: NORMAL
SITE SITE: NORMAL
SOURCE SOURCE: NORMAL
SOURCE SOURCE: NORMAL

## 2018-07-23 PROCEDURE — 700102 HCHG RX REV CODE 250 W/ 637 OVERRIDE(OP): Performed by: INTERNAL MEDICINE

## 2018-07-23 PROCEDURE — 99239 HOSP IP/OBS DSCHRG MGMT >30: CPT | Performed by: INTERNAL MEDICINE

## 2018-07-23 PROCEDURE — A9270 NON-COVERED ITEM OR SERVICE: HCPCS | Performed by: HOSPITALIST

## 2018-07-23 PROCEDURE — A9270 NON-COVERED ITEM OR SERVICE: HCPCS | Performed by: INTERNAL MEDICINE

## 2018-07-23 PROCEDURE — 700102 HCHG RX REV CODE 250 W/ 637 OVERRIDE(OP): Performed by: HOSPITALIST

## 2018-07-23 RX ADMIN — NYSTATIN 500000 UNITS: 100000 SUSPENSION ORAL at 08:44

## 2018-07-23 RX ADMIN — POTASSIUM CHLORIDE 20 MEQ: 1500 TABLET, EXTENDED RELEASE ORAL at 05:18

## 2018-07-23 RX ADMIN — MAGNESIUM GLUCONATE 500 MG ORAL TABLET 400 MG: 500 TABLET ORAL at 05:18

## 2018-07-23 RX ADMIN — OXYCODONE HYDROCHLORIDE 5 MG: 5 TABLET ORAL at 08:43

## 2018-07-23 RX ADMIN — OMEPRAZOLE 20 MG: 20 CAPSULE, DELAYED RELEASE ORAL at 05:18

## 2018-07-23 RX ADMIN — NICOTINE 14 MG: 14 PATCH, EXTENDED RELEASE TRANSDERMAL at 05:18

## 2018-07-23 RX ADMIN — FLUOXETINE HYDROCHLORIDE 40 MG: 20 CAPSULE ORAL at 05:18

## 2018-07-23 ASSESSMENT — LIFESTYLE VARIABLES
AGITATION: NORMAL ACTIVITY
TOTAL SCORE: 4
AUDITORY DISTURBANCES: NOT PRESENT
TREMOR: NO TREMOR
ANXIETY: MILDLY ANXIOUS
TOTAL SCORE: VERY MILD ITCHING, PINS AND NEEDLES SENSATION, BURNING OR NUMBNESS
TOTAL SCORE: VERY MILD ITCHING, PINS AND NEEDLES SENSATION, BURNING OR NUMBNESS
TOTAL SCORE: 0
TOTAL SCORE: 4
ANXIETY: NO ANXIETY (AT EASE)
VISUAL DISTURBANCES: NOT PRESENT
TREMOR: NO TREMOR
TREMOR: NO TREMOR
NAUSEA AND VOMITING: NO NAUSEA AND NO VOMITING
ORIENTATION AND CLOUDING OF SENSORIUM: ORIENTED AND CAN DO SERIAL ADDITIONS
PAROXYSMAL SWEATS: NO SWEAT VISIBLE
HEADACHE, FULLNESS IN HEAD: VERY MILD
VISUAL DISTURBANCES: NOT PRESENT
NAUSEA AND VOMITING: NO NAUSEA AND NO VOMITING
ORIENTATION AND CLOUDING OF SENSORIUM: ORIENTED AND CAN DO SERIAL ADDITIONS
NAUSEA AND VOMITING: NO NAUSEA AND NO VOMITING
AGITATION: NORMAL ACTIVITY
AUDITORY DISTURBANCES: NOT PRESENT
ANXIETY: MILDLY ANXIOUS
HEADACHE, FULLNESS IN HEAD: NOT PRESENT
PAROXYSMAL SWEATS: BARELY PERCEPTIBLE SWEATING. PALMS MOIST
VISUAL DISTURBANCES: NOT PRESENT
ORIENTATION AND CLOUDING OF SENSORIUM: ORIENTED AND CAN DO SERIAL ADDITIONS
AUDITORY DISTURBANCES: NOT PRESENT
HEADACHE, FULLNESS IN HEAD: VERY MILD
PAROXYSMAL SWEATS: BARELY PERCEPTIBLE SWEATING. PALMS MOIST
AGITATION: NORMAL ACTIVITY

## 2018-07-23 ASSESSMENT — PAIN SCALES - GENERAL: PAINLEVEL_OUTOF10: 7

## 2018-07-23 NOTE — PROGRESS NOTES
Assumed care of pt, received report from day shift RN, pt assessed.  Pt complaining of 7/10 generalized pain, pt medicated with PRN pain medication per MAR.  Pt is A&O x4.  Pt moderat fall risk, wearing treaded socks, bed locked and in lowest position, bed alarm is on.  Pt instructed to call for assistance prior to getting OOB, pt verbalized understanding.  Call light, phone, and personal belongings within reach.

## 2018-07-23 NOTE — PROGRESS NOTES
Cane was delivered and fitted to patient.  If any further assistance needed, please call extension [phone] or place order for Ortho Technician assistance as a communication order in [employer]

## 2018-07-23 NOTE — DISCHARGE INSTRUCTIONS
Discharge Instructions    Discharged to home by taxi with self. Discharged via walking, hospital escort: Yes.  Special equipment needed: Cane    Be sure to schedule a follow-up appointment with your primary care doctor or any specialists as instructed.     Discharge Plan:   Smoking Cessation Offered: Patient Refused  Influenza Vaccine Indication: Not indicated: Previously immunized this influenza season and > 8 years of age    I understand that a diet low in cholesterol, fat, and sodium is recommended for good health. Unless I have been given specific instructions below for another diet, I accept this instruction as my diet prescription.   Other diet: regular      Special Instructions: None    · Is patient discharged on Warfarin / Coumadin?   No     Depression / Suicide Risk    As you are discharged from this [facility] facility, it is important to learn how to keep safe from harming yourself.    Recognize the warning signs:  · Abrupt changes in personality, positive or negative- including increase in energy   · Giving away possessions  · Change in eating patterns- significant weight changes-  positive or negative  · Change in sleeping patterns- unable to sleep or sleeping all the time   · Unwillingness or inability to communicate  · Depression  · Unusual sadness, discouragement and loneliness  · Talk of wanting to die  · Neglect of personal appearance   · Rebelliousness- reckless behavior  · Withdrawal from people/activities they love  · Confusion- inability to concentrate     If you or a loved one observes any of these behaviors or has concerns about self-harm, here's what you can do:  · Talk about it- your feelings and reasons for harming yourself  · Remove any means that you might use to hurt yourself (examples: pills, rope, extension cords, firearm)  · Get professional help from the community (Mental Health, Substance Abuse, psychological counseling)  · Do not be alone:Call your Safe Contact- someone whom  you trust who will be there for you.  · Call your local CRISIS HOTLINE 954-6910 or 680-761-9506  · Call your local Children's Mobile Crisis Response Team [place] (104) 857-4647 or www.[employer]  · Call the toll free National Suicide Prevention Hotlines   · National Suicide Prevention Lifeline 654-227-JZYD (2420)  · National [place] Line Network 800-SUICIDE (637-2523)

## 2018-07-23 NOTE — PROGRESS NOTES
Patient a+o x 4, denies sob/lightheadedness/numbness/tingling/dizziness/chest pain/n/v/d. Negative on ciwa - score 0.  Tolerating diet. Fall precautions/hourly rounding maintained, call light within reach and functioning, all items within reach.  Patient encouraged to call for assistance, poc reviewed with patient, ?'s/concerns answered.     Discharge instructions reviewed with patient, ?'s/concerns answered, patient states she is ready to be discharged home.

## 2018-07-24 ENCOUNTER — PATIENT OUTREACH (OUTPATIENT)
Dept: HEALTH INFORMATION MANAGEMENT | Facility: OTHER | Age: 58
End: 2018-07-24

## 2018-07-24 ENCOUNTER — HOSPITAL ENCOUNTER (EMERGENCY)
Facility: MEDICAL CENTER | Age: 58
End: 2018-07-24
Attending: EMERGENCY MEDICINE
Payer: MEDICAID

## 2018-07-24 VITALS
HEART RATE: 93 BPM | TEMPERATURE: 97.2 F | WEIGHT: 200 LBS | OXYGEN SATURATION: 97 % | SYSTOLIC BLOOD PRESSURE: 128 MMHG | DIASTOLIC BLOOD PRESSURE: 85 MMHG | HEIGHT: 66 IN | RESPIRATION RATE: 16 BRPM | BODY MASS INDEX: 32.14 KG/M2

## 2018-07-24 DIAGNOSIS — Z59.00 HOMELESS: ICD-10-CM

## 2018-07-24 DIAGNOSIS — R45.851 SUICIDAL IDEATION: ICD-10-CM

## 2018-07-24 DIAGNOSIS — F10.920 ALCOHOLIC INTOXICATION WITHOUT COMPLICATION (HCC): ICD-10-CM

## 2018-07-24 LAB
AMPHET UR QL SCN: NEGATIVE
BARBITURATES UR QL SCN: NEGATIVE
BENZODIAZ UR QL SCN: POSITIVE
BZE UR QL SCN: NEGATIVE
CANNABINOIDS UR QL SCN: NEGATIVE
METHADONE UR QL SCN: NEGATIVE
OPIATES UR QL SCN: NEGATIVE
OXYCODONE UR QL SCN: POSITIVE
PCP UR QL SCN: NEGATIVE
POC BREATHALIZER: 0.08 PERCENT (ref 0–0.01)
POC BREATHALIZER: 0.11 PERCENT (ref 0–0.01)
POC BREATHALIZER: 0.11 PERCENT (ref 0–0.01)
POC BREATHALIZER: 0.16 PERCENT (ref 0–0.01)
POC BREATHALIZER: 0.19 PERCENT (ref 0–0.01)
PROPOXYPH UR QL SCN: NEGATIVE

## 2018-07-24 PROCEDURE — 302970 POC BREATHALIZER

## 2018-07-24 PROCEDURE — 302970 POC BREATHALIZER: Performed by: EMERGENCY MEDICINE

## 2018-07-24 PROCEDURE — 80307 DRUG TEST PRSMV CHEM ANLYZR: CPT

## 2018-07-24 PROCEDURE — 99285 EMERGENCY DEPT VISIT HI MDM: CPT

## 2018-07-24 PROCEDURE — 90791 PSYCH DIAGNOSTIC EVALUATION: CPT

## 2018-07-24 SDOH — ECONOMIC STABILITY - HOUSING INSECURITY: HOMELESSNESS UNSPECIFIED: Z59.00

## 2018-07-24 ASSESSMENT — PAIN SCALES - GENERAL: PAINLEVEL_OUTOF10: 0

## 2018-07-24 NOTE — ED NOTES
Belongings placed in locker 13 after searched by security. Medications checked into pharmacy by this RN with slip #[id number]

## 2018-07-24 NOTE — ED NOTES
Pt woke up. Put all clothes on and states she's leaving. Explained to patient she needs to see lifeskills before leaving. Pt became angry and verbally abusive. Dr. Contreras made aware via phone and agrees to officially place patient on a legal hold.
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
## 2018-07-24 NOTE — ED NOTES
Pt resting on cot. Eyes closed.  Pt breathing regular and not labored.  Sitter outside room.  Will continue to monitor.
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
## 2018-07-24 NOTE — DISCHARGE INSTRUCTIONS
Depression, Adult  Depression is feeling sad, low, down in the dumps, blue, gloomy, or empty. In general, there are two kinds of depression:  · Normal sadness or grief. This can happen after something upsetting. It often goes away on its own within 2 weeks. After losing a loved one (bereavement), normal sadness and grief may last longer than two weeks. It usually gets better with time.  · Clinical depression. This kind lasts longer than normal sadness or grief. It keeps you from doing the things you normally do in life. It is often hard to function at home, work, or at school. It may affect your relationships with others. Treatment is often needed.  GET HELP RIGHT AWAY IF:  · You have thoughts about hurting yourself or others.  · You lose touch with reality (psychotic symptoms). You may:  ¨ See or hear things that are not real.  ¨ Have untrue beliefs about your life or people around you.  · Your medicine is giving you problems.  MAKE SURE YOU:  · Understand these instructions.  · Will watch your condition.  · Will get help right away if you are not doing well or get worse.     This information is not intended to replace advice given to you by your health care provider. Make sure you discuss any questions you have with your health care provider.     Document Released: 01/20/2012 Document Revised: 01/08/2016 Document Reviewed: 04/18/2013  [employer] Interactive Patient Education ©2016 [employer]    Alcohol Problems  Most adults who drink alcohol drink in moderation (not a lot) are at low risk for developing problems related to their drinking. However, all drinkers, including low-risk drinkers, should know about the health risks connected with drinking alcohol.  RECOMMENDATIONS FOR LOW-RISK DRINKING   Drink in moderation. Moderate drinking is defined as follows:   · Men - no more than 2 drinks per day.  · Nonpregnant women - no more than 1 drink per day.  · Over age 65 - no more than 1 drink per day.  A standard drink  is 12 grams of pure alcohol, which is equal to a 12 ounce bottle of beer or wine cooler, a 5 ounce glass of wine, or 1.5 ounces of distilled spirits (such as whiskey, gama, vodka, or rum).   ABSTAIN FROM (DO NOT DRINK) ALCOHOL:  · When pregnant or considering pregnancy.  · When taking a medication that interacts with alcohol.  · If you are alcohol dependent.  · A medical condition that prohibits drinking alcohol (such as ulcer, liver disease, or heart disease).  DISCUSS WITH YOUR CAREGIVER:  · If you are at risk for coronary heart disease, discuss the potential benefits and risks of alcohol use: Light to moderate drinking is associated with lower rates of coronary heart disease in certain populations (for example, men over age 45 and postmenopausal women). Infrequent or nondrinkers are advised not to begin light to moderate drinking to reduce the risk of coronary heart disease so as to avoid creating an alcohol-related problem. Similar protective effects can likely be gained through proper diet and exercise.  · Women and the elderly have smaller amounts of body water than men. As a result women and the elderly achieve a higher blood alcohol concentration after drinking the same amount of alcohol.  · Exposing a fetus to alcohol can cause a broad range of birth defects referred to as Fetal Alcohol Syndrome (FAS) or Alcohol-Related Birth Defects (ARBD). Although FAS/ARBD is connected with excessive alcohol consumption during pregnancy, studies also have reported neurobehavioral problems in infants born to mothers reporting drinking an average of 1 drink per day during pregnancy.  · Heavier drinking (the consumption of more than 4 drinks per occasion by men and more than 3 drinks per occasion by women) impairs learning (cognitive) and psychomotor functions and increases the risk of alcohol-related problems, including accidents and injuries.  CAGE QUESTIONS:   · Have you ever felt that you should Cut down on your  drinking?  · Have people Annoyed you by criticizing your drinking?  · Have you ever felt bad or Guilty about your drinking?  · Have you ever had a drink first thing in the morning to steady your nerves or get rid of a hangover (Eye opener)?  If you answered positively to any of these questions: You may be at risk for alcohol-related problems if alcohol consumption is:   · Men: Greater than 14 drinks per week or more than 4 drinks per occasion.  · Women: Greater than 7 drinks per week or more than 3 drinks per occasion.  Do you or your family have a medical history of alcohol-related problems, such as:  · Blackouts.  · Sexual dysfunction.  · Depression.  · Trauma.  · Liver dysfunction.  · Sleep disorders.  · Hypertension.  · Chronic abdominal pain.  · Has your drinking ever caused you problems, such as problems with your family, problems with your work (or school) performance, or accidents/injuries?  · Do you have a compulsion to drink or a preoccupation with drinking?  · Do you have poor control or are you unable to stop drinking once you have started?  · Do you have to drink to avoid withdrawal symptoms?  · Do you have problems with withdrawal such as tremors, nausea, sweats, or mood disturbances?  · Does it take more alcohol than in the past to get you high?  · Do you feel a strong urge to drink?  · Do you change your plans so that you can have a drink?  · Do you ever drink in the morning to relieve the shakes or a hangover?  If you have answered a number of the previous questions positively, it may be time for you to talk to your caregivers, family, and friends and see if they think you have a problem. Alcoholism is a chemical dependency that keeps getting worse and will eventually destroy your health and relationships. Many alcoholics end up dead, impoverished, or in halfway. This is often the end result of all chemical dependency.  · Do not be discouraged if you are not ready to take action  immediately.  · Decisions to change behavior often involve up and down desires to change and feeling like you cannot decide.  · Try to think more seriously about your drinking behavior.  · Think of the reasons to quit.  WHERE TO GO FOR ADDITIONAL INFORMATION   · The National Bude on Alcohol Abuse and Alcoholism (NIAAA)  www.niaaa.nih.gov  · National [place] on Alcoholism and Drug Dependence (NCADD)  www.ncadd.org  · American Society of Addiction Medicine (ASAM)  www.asam.org   Document Released: 12/18/2006 Document Revised: 03/11/2013 Document Reviewed: 08/05/2009  [employer]® Patient Information ©2014 [employer], [employer].

## 2018-07-24 NOTE — ED NOTES
Assumed care of pt from TETO Florentino. Pt sleeping in stretcher.  Resp even and unlabored. NAD. Pt in view of sitter. Ongoing monitoring.      Contacted Dr. Sonia Gresham office for futher workup/eval.
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
## 2018-07-24 NOTE — ED PROVIDER NOTES
ED Provider Note    Patient now clinically sober, legally sober at 0.08.  Patient seen by behavioral health, found to be no longer at risk to self.  Patient plans on going to her daughter's house in [place].  She is discharged in improved condition.  Patient advised to return if worse or for any concerns.  Patient provided referral to [facility]
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
## 2018-07-24 NOTE — ED NOTES
VSS. Pt given DC instructions, including follow up education, with verbalized understanding. Returned 2 bags of belongings and medications from pharmacy. Changed to clothes in bathroom. Ambulatory to exit with steady gait with all belongings in possession.

## 2018-07-25 ENCOUNTER — PATIENT OUTREACH (OUTPATIENT)
Dept: HEALTH INFORMATION MANAGEMENT | Facility: OTHER | Age: 58
End: 2018-07-25

## 2018-07-25 ENCOUNTER — HOSPITAL ENCOUNTER (EMERGENCY)
Dept: HOSPITAL 8 - ED | Age: 58
Discharge: HOME | End: 2018-07-25
Payer: MEDICAID

## 2018-07-25 ENCOUNTER — HOSPITAL ENCOUNTER (EMERGENCY)
Dept: HOSPITAL 8 - ED | Age: 58
Discharge: LEFT BEFORE DISCHARGE | End: 2018-07-25
Payer: MEDICAID

## 2018-07-25 VITALS — HEIGHT: 66 IN | BODY MASS INDEX: 27.64 KG/M2 | WEIGHT: 171.96 LBS

## 2018-07-25 VITALS — BODY MASS INDEX: 30.85 KG/M2 | HEIGHT: 65 IN | WEIGHT: 185.19 LBS

## 2018-07-25 VITALS — DIASTOLIC BLOOD PRESSURE: 75 MMHG | SYSTOLIC BLOOD PRESSURE: 106 MMHG

## 2018-07-25 VITALS — DIASTOLIC BLOOD PRESSURE: 72 MMHG | SYSTOLIC BLOOD PRESSURE: 115 MMHG

## 2018-07-25 DIAGNOSIS — F10.120: Primary | ICD-10-CM

## 2018-07-25 DIAGNOSIS — E87.6: ICD-10-CM

## 2018-07-25 DIAGNOSIS — Z79.82: ICD-10-CM

## 2018-07-25 DIAGNOSIS — Z88.2: ICD-10-CM

## 2018-07-25 DIAGNOSIS — I10: ICD-10-CM

## 2018-07-25 DIAGNOSIS — F10.220: ICD-10-CM

## 2018-07-25 DIAGNOSIS — S09.90XA: Primary | ICD-10-CM

## 2018-07-25 DIAGNOSIS — Z88.0: ICD-10-CM

## 2018-07-25 DIAGNOSIS — K21.9: ICD-10-CM

## 2018-07-25 PROCEDURE — 99284 EMERGENCY DEPT VISIT MOD MDM: CPT

## 2018-07-25 PROCEDURE — 36415 COLL VENOUS BLD VENIPUNCTURE: CPT

## 2018-07-25 PROCEDURE — 70450 CT HEAD/BRAIN W/O DYE: CPT

## 2018-07-25 PROCEDURE — 80307 DRUG TEST PRSMV CHEM ANLYZR: CPT

## 2018-07-25 PROCEDURE — 99283 EMERGENCY DEPT VISIT LOW MDM: CPT

## 2018-07-25 PROCEDURE — 72125 CT NECK SPINE W/O DYE: CPT

## 2018-07-26 ENCOUNTER — HOSPITAL ENCOUNTER (EMERGENCY)
Dept: HOSPITAL 8 - ED | Age: 58
Discharge: HOME | End: 2018-07-26
Payer: MEDICAID

## 2018-07-26 ENCOUNTER — HOSPITAL ENCOUNTER (EMERGENCY)
Facility: MEDICAL CENTER | Age: 58
End: 2018-07-26
Payer: MEDICAID

## 2018-07-26 VITALS
OXYGEN SATURATION: 94 % | DIASTOLIC BLOOD PRESSURE: 70 MMHG | HEART RATE: 93 BPM | WEIGHT: 200 LBS | SYSTOLIC BLOOD PRESSURE: 107 MMHG | HEIGHT: 66 IN | RESPIRATION RATE: 18 BRPM | TEMPERATURE: 96.9 F | BODY MASS INDEX: 32.14 KG/M2

## 2018-07-26 VITALS — WEIGHT: 149.91 LBS | HEIGHT: 65 IN | BODY MASS INDEX: 24.98 KG/M2

## 2018-07-26 VITALS — SYSTOLIC BLOOD PRESSURE: 119 MMHG | DIASTOLIC BLOOD PRESSURE: 77 MMHG

## 2018-07-26 DIAGNOSIS — Z79.899: ICD-10-CM

## 2018-07-26 DIAGNOSIS — I95.9: ICD-10-CM

## 2018-07-26 DIAGNOSIS — F10.120: Primary | ICD-10-CM

## 2018-07-26 DIAGNOSIS — K21.9: ICD-10-CM

## 2018-07-26 DIAGNOSIS — I10: ICD-10-CM

## 2018-07-26 LAB
ALBUMIN SERPL-MCNC: 3.7 G/DL (ref 3.4–5)
ALP SERPL-CCNC: 155 U/L (ref 45–117)
ALT SERPL-CCNC: 30 U/L (ref 12–78)
ANION GAP SERPL CALC-SCNC: 13 MMOL/L (ref 5–15)
APAP SERPL-MCNC: < 2 MCG/ML (ref 10–30)
BASOPHILS # BLD AUTO: 0.08 X10^3/UL (ref 0–0.1)
BASOPHILS NFR BLD AUTO: 1 % (ref 0–1)
BILIRUB SERPL-MCNC: 0.3 MG/DL (ref 0.2–1)
CALCIUM SERPL-MCNC: 8.3 MG/DL (ref 8.5–10.1)
CHLORIDE SERPL-SCNC: 107 MMOL/L (ref 98–107)
CREAT SERPL-MCNC: 0.77 MG/DL (ref 0.55–1.02)
EOSINOPHIL # BLD AUTO: 0.07 X10^3/UL (ref 0–0.4)
EOSINOPHIL NFR BLD AUTO: 1 % (ref 1–7)
ERYTHROCYTE [DISTWIDTH] IN BLOOD BY AUTOMATED COUNT: 21 % (ref 9.6–15.2)
LYMPHOCYTES # BLD AUTO: 2.01 X10^3/UL (ref 1–3.4)
LYMPHOCYTES NFR BLD AUTO: 38 % (ref 22–44)
MCH RBC QN AUTO: 30.4 PG (ref 27–34.8)
MCHC RBC AUTO-ENTMCNC: 32.9 G/DL (ref 32.4–35.8)
MCV RBC AUTO: 92.4 FL (ref 80–100)
MD: NO
MONOCYTES # BLD AUTO: 0.58 X10^3/UL (ref 0.2–0.8)
MONOCYTES NFR BLD AUTO: 11 % (ref 2–9)
NEUTROPHILS # BLD AUTO: 2.6 X10^3/UL (ref 1.8–6.8)
NEUTROPHILS NFR BLD AUTO: 49 % (ref 42–75)
PLATELET # BLD AUTO: 302 X10^3/UL (ref 130–400)
PMV BLD AUTO: 7.9 FL (ref 7.4–10.4)
PROT SERPL-MCNC: 8.1 G/DL (ref 6.4–8.2)
RBC # BLD AUTO: 3.96 X10^6/UL (ref 3.82–5.3)
VANCOMYCIN TROUGH SERPL-MCNC: < 1.7 MG/DL (ref 2.8–20)

## 2018-07-26 PROCEDURE — 85025 COMPLETE CBC W/AUTO DIFF WBC: CPT

## 2018-07-26 PROCEDURE — 80053 COMPREHEN METABOLIC PANEL: CPT

## 2018-07-26 PROCEDURE — 36415 COLL VENOUS BLD VENIPUNCTURE: CPT

## 2018-07-26 PROCEDURE — 80307 DRUG TEST PRSMV CHEM ANLYZR: CPT

## 2018-07-26 PROCEDURE — G0480 DRUG TEST DEF 1-7 CLASSES: HCPCS

## 2018-07-26 PROCEDURE — 302449 STATCHG TRIAGE ONLY (STATISTIC)

## 2018-07-26 PROCEDURE — 99284 EMERGENCY DEPT VISIT MOD MDM: CPT

## 2018-07-26 PROCEDURE — 80329 ANALGESICS NON-OPIOID 1 OR 2: CPT

## 2018-07-26 NOTE — ED NOTES
"Breathalyzer 0.127   Pt reports having a \"half a beer today\", and states \"the doctor at [facility] told be to keep drinking, told me to NOT stop drinking!\"  "

## 2018-07-26 NOTE — ED TRIAGE NOTES
Chief Complaint   Patient presents with   • Alcohol Intoxication     Pt bib [facility] to triage.     • Other     Pt reports she is tired and thirsty.    Pt to triage in NAD.  Pt educated on triage process and instructed to notify triage RN of any change in status.

## 2018-07-26 NOTE — ED NOTES
Pt continues to yell and scream obscenities, pt non-compliant. RN explained wait status for ERP, Pt yelling and demanding. Pt began disrobing, and placing her own clothing back on. Pt decided to leave AMA. AMA form signed prior to pt meeting with ERP.

## 2018-07-26 NOTE — ED NOTES
Pt obviously intoxicated, yelling, inappropriate, disrespectful. RN explained expectations, provided blanket and gown to pt. Pt continues to display cantankerous behavior. Demanding and requires constant redirection.

## 2018-07-27 ENCOUNTER — HOSPITAL ENCOUNTER (EMERGENCY)
Facility: MEDICAL CENTER | Age: 58
End: 2018-07-27
Payer: MEDICAID

## 2018-07-27 ENCOUNTER — HOSPITAL ENCOUNTER (EMERGENCY)
Facility: MEDICAL CENTER | Age: 58
End: 2018-07-27
Attending: EMERGENCY MEDICINE
Payer: MEDICAID

## 2018-07-27 VITALS
OXYGEN SATURATION: 93 % | HEIGHT: 65 IN | SYSTOLIC BLOOD PRESSURE: 178 MMHG | HEART RATE: 117 BPM | BODY MASS INDEX: 34.85 KG/M2 | TEMPERATURE: 97 F | RESPIRATION RATE: 16 BRPM | DIASTOLIC BLOOD PRESSURE: 100 MMHG

## 2018-07-27 VITALS
TEMPERATURE: 97.9 F | HEIGHT: 66 IN | OXYGEN SATURATION: 95 % | SYSTOLIC BLOOD PRESSURE: 124 MMHG | BODY MASS INDEX: 33.66 KG/M2 | RESPIRATION RATE: 16 BRPM | DIASTOLIC BLOOD PRESSURE: 80 MMHG | HEART RATE: 95 BPM | WEIGHT: 209.44 LBS

## 2018-07-27 VITALS
WEIGHT: 190.48 LBS | SYSTOLIC BLOOD PRESSURE: 144 MMHG | HEART RATE: 98 BPM | RESPIRATION RATE: 17 BRPM | DIASTOLIC BLOOD PRESSURE: 88 MMHG | BODY MASS INDEX: 31.7 KG/M2 | TEMPERATURE: 97.8 F | OXYGEN SATURATION: 92 %

## 2018-07-27 DIAGNOSIS — F10.921 ACUTE ALCOHOLIC INTOXICATION WITH DELIRIUM (HCC): ICD-10-CM

## 2018-07-27 DIAGNOSIS — F10.920 ALCOHOLIC INTOXICATION WITHOUT COMPLICATION (HCC): ICD-10-CM

## 2018-07-27 PROCEDURE — 99285 EMERGENCY DEPT VISIT HI MDM: CPT

## 2018-07-27 PROCEDURE — 99284 EMERGENCY DEPT VISIT MOD MDM: CPT

## 2018-07-27 PROCEDURE — 302449 STATCHG TRIAGE ONLY (STATISTIC)

## 2018-07-27 PROCEDURE — 304561 HCHG STAT O2

## 2018-07-27 RX ORDER — THIAMINE MONONITRATE (VIT B1) 100 MG
100 TABLET ORAL ONCE
Status: DISCONTINUED | OUTPATIENT
Start: 2018-07-27 | End: 2018-07-28 | Stop reason: HOSPADM

## 2018-07-27 RX ORDER — M-VIT,TX,IRON,MINS/CALC/FOLIC 27MG-0.4MG
1 TABLET ORAL DAILY
Status: DISCONTINUED | OUTPATIENT
Start: 2018-07-28 | End: 2018-07-27

## 2018-07-27 RX ORDER — FOLIC ACID 1 MG/1
1 TABLET ORAL ONCE
Status: DISCONTINUED | OUTPATIENT
Start: 2018-07-27 | End: 2018-07-27

## 2018-07-27 RX ORDER — M-VIT,TX,IRON,MINS/CALC/FOLIC 27MG-0.4MG
1 TABLET ORAL DAILY
Status: DISCONTINUED | OUTPATIENT
Start: 2018-07-28 | End: 2018-07-28 | Stop reason: HOSPADM

## 2018-07-27 RX ORDER — THIAMINE MONONITRATE (VIT B1) 100 MG
100 TABLET ORAL ONCE
Status: DISCONTINUED | OUTPATIENT
Start: 2018-07-27 | End: 2018-07-27

## 2018-07-27 RX ORDER — FOLIC ACID 1 MG/1
1 TABLET ORAL ONCE
Status: DISCONTINUED | OUTPATIENT
Start: 2018-07-27 | End: 2018-07-28 | Stop reason: HOSPADM

## 2018-07-27 ASSESSMENT — LIFESTYLE VARIABLES
TOTAL SCORE: 4
CONSUMPTION TOTAL: POSITIVE
DO YOU DRINK ALCOHOL: YES
EVER FELT BAD OR GUILTY ABOUT YOUR DRINKING: YES
EVER HAD A DRINK FIRST THING IN THE MORNING TO STEADY YOUR NERVES TO GET RID OF A HANGOVER: YES
TOTAL SCORE: 4
HOW MANY TIMES IN THE PAST YEAR HAVE YOU HAD 5 OR MORE DRINKS IN A DAY: 20
DOES PATIENT WANT TO TALK TO SOMEONE ABOUT QUITTING: YES
HAVE YOU EVER FELT YOU SHOULD CUT DOWN ON YOUR DRINKING: YES
TOTAL SCORE: 4
AVERAGE NUMBER OF DAYS PER WEEK YOU HAVE A DRINK CONTAINING ALCOHOL: 7
HAVE PEOPLE ANNOYED YOU BY CRITICIZING YOUR DRINKING: YES
DO YOU DRINK ALCOHOL: YES
ON A TYPICAL DAY WHEN YOU DRINK ALCOHOL HOW MANY DRINKS DO YOU HAVE: 8
DOES PATIENT WANT TO STOP DRINKING: YES

## 2018-07-27 NOTE — ED TRIAGE NOTES
"Chief Complaint   Patient presents with   • Alcohol Intoxication     Pt found intoxicated, rouses to voice. VSS on RA.        /80   Pulse 80   Temp 36.6 °C (97.9 °F)   Resp 16   Ht 1.676 m (5' 6\")   Wt 95 kg (209 lb 7 oz)   LMP 11/02/2015   SpO2 97%   BMI 33.80 kg/m²     PT BIB [facility]. Placed in bed. Pt sleeping at this time. Pt rouses briefly to voice, following directions.  "

## 2018-07-27 NOTE — ED PROVIDER NOTES
ED Provider Note    Scribed for Satish Ayala M.D. by Collins Escalera. 7/27/2018  4:07 AM    Primary care provider: Pcp Pt States None  Means of arrival: Ambulance  History obtained from: Patient   And ambulance report  History limited by: Alcohol Intoxication     CHIEF COMPLAINT  Chief Complaint   Patient presents with   • Alcohol Intoxication     Pt found intoxicated, rouses to voice. VSS on RA.        HPI  Ashleigh Marquis is a 57 y.o. female who presents to the Emergency Department for evaluation of alcohol intoxication.  No history of trauma.  The patient has no apparent complaints however history and physical examination is limited secondary to the patient's acute alcohol intoxication    HPI is limited secondary to alcohol intoxication.     REVIEW OF SYSTEMS  Pertinent positives include alcohol intoxication.   Pertinent negatives include no complaints of pain.    ROS limited secondary to alcohol intoxication. C.     PAST MEDICAL HISTORY   has a past medical history of Alcoholism ([facility]); Anxiety; Arthritis; ASTHMA; Cancer (HCC) (1981); Chronic low back pain; Congestive heart failure ([facility]); Depression; EtOH dependence ([facility]); Fall; GERD (gastroesophageal reflux disease); HTN; Hypertension; Indigestion; Muscle disorder; OSTEOPOROSIS; Other specified symptom associated with female genital organs; Psychiatric disorder; PTSD (post-traumatic stress disorder); Renal disorder; Seizure (HCC); Ulcer; and Vitamin D deficiency.    SURGICAL HISTORY   has a past surgical history that includes hernia repair (1977); cysto stent placemnt pre surg (10/7/2010); cystoscopy stent placement (11/9/2010); ureteroscopy (11/9/2010); lasertripsy (11/9/2010); and stent removal (11/9/2010).    SOCIAL HISTORY  Social History   Substance Use Topics   • Smoking status: Current Every Day Smoker     Packs/day: 0.50     Years: 20.00     Types: Cigarettes   • Smokeless tobacco: Never Used      Comment: 1/2 pack per day   • Alcohol use Yes       "Comment: \"lots of vodka\" currently intoxicated      History   Drug Use No       FAMILY HISTORY  Family History   Problem Relation Age of Onset   • Heart Disease Father    • Hypertension Father    • Diabetes Father    • Cancer Paternal Grandmother    • Depression Other    • Lung Disease Neg Hx    • Stroke Neg Hx        CURRENT MEDICATIONS  Home Medications     Reviewed by Robyn Cooley R.N. (Registered Nurse) on 07/27/18 at 0405  Med List Status: Unable to Obtain   Medication Last Dose Status   acetaminophen (TYLENOL) 325 MG Tab  Active   benzocaine (ANBESOL) 10 % Gel  Active   doxepin (SINEQUAN) 50 MG Cap unknown Active   fluoxetine (PROZAC) 40 MG capsule unknown Active   ibuprofen (MOTRIN) 800 MG Tab  Active   lisinopril (PRINIVIL) 10 MG Tab unknown Active   loperamide (IMODIUM) 2 MG Cap  Active   lorazepam (ATIVAN) 1 MG Tab unknown Active   nystatin (MYCOSTATIN) 009337 UNIT/ML Suspension  Active   potassium chloride SA (KDUR) 20 MEQ Tab CR unknown Active   spironolactone (ALDACTONE) 25 MG Tab unknown Active                ALLERGIES  Allergies   Allergen Reactions   • Sulfa Drugs Vomiting   • Toradol Vomiting   • Neurontin [Gabapentin]      Bowel incontinence         PHYSICAL EXAM  VITAL SIGNS: /80   Pulse 80   Temp 36.6 °C (97.9 °F)   Resp 16   Ht 1.676 m (5' 6\")   Wt 95 kg (209 lb 7 oz)   LMP 11/02/2015   SpO2 97%   BMI 33.80 kg/m²     Nursing note and vitals reviewed.  Constitutional: Well-developed and well-nourished. No distress. Opens eyes to phsyical stimulus. Smells of alcohol.  HENT: Head is normocephalic and atraumatic. Oropharynx is clear and moist without exudate or erythema.   Eyes: Pupils are equal, round, and reactive to light. Conjunctiva are normal.   Cardiovascular: Normal rate and regular rhythm. No murmur heard. Normal radial pulses.  Pulmonary/Chest: Breath sounds normal. No wheezes or rales.   Abdominal: Soft and non-tender. No distention    Musculoskeletal: Extremities " exhibit normal range of motion without edema or tenderness.   Neurological: Awake, alert and oriented to person, place, and time. No focal deficits noted.  Skin: Skin is warm and dry. No rash.   Psychiatric: Normal mood and affect. Appropriate for clinical situation      COURSE & MEDICAL DECISION MAKING  Nursing notes, VS, PMSFHx reviewed in chart.     Review of past medical records shows the patient was seen in the ED multiple times in the past secondary to similar complaints.     4:07 AM - Patient seen and examined at bedside. The differential diagnoses include but are not limited to: Acute alcohol intoxication.  I do not see any evidence of significant trauma.    10:58 AM the patient has been observed in the emergency department.  She is slowly regaining sobriety.  We have done several trials of ambulation but the patient still has a staggering intoxicated gait.  However she confirms that she does not have any pain.  I do not find any complaints that require further evaluation.  The patient simply seems to be heavily intoxicated.  She will be observed until she has a stable gait at which time she will be discharged.    FINAL IMPRESSION  1. Acute alcoholic intoxication with delirium (HCC)          Collins GAN (Cayetano), am scribing for, and in the presence of, Satish Ayala M.D..    Electronically signed by: Collins Escalera (Cayetano), 7/27/2018    Satish GAN M.D. personally performed the services described in this documentation, as scribed by Collins Escalera in my presence, and it is both accurate and complete.    The note accurately reflects work and decisions made by me.  Satish Ayala  7/27/2018  10:58 AM

## 2018-07-27 NOTE — ED NOTES
Patient ambulated again to bathroom, continues to need 1:1 assist to avoid falling. Macaroni and cheese and juice provided.

## 2018-07-27 NOTE — DISCHARGE INSTRUCTIONS
Alcohol Problems  Most adults who drink alcohol drink in moderation (not a lot) are at low risk for developing problems related to their drinking. However, all drinkers, including low-risk drinkers, should know about the health risks connected with drinking alcohol.  RECOMMENDATIONS FOR LOW-RISK DRINKING   Drink in moderation. Moderate drinking is defined as follows:   · Men - no more than 2 drinks per day.  · Nonpregnant women - no more than 1 drink per day.  · Over age 65 - no more than 1 drink per day.  A standard drink is 12 grams of pure alcohol, which is equal to a 12 ounce bottle of beer or wine cooler, a 5 ounce glass of wine, or 1.5 ounces of distilled spirits (such as whiskey, gama, vodka, or rum).   ABSTAIN FROM (DO NOT DRINK) ALCOHOL:  · When pregnant or considering pregnancy.  · When taking a medication that interacts with alcohol.  · If you are alcohol dependent.  · A medical condition that prohibits drinking alcohol (such as ulcer, liver disease, or heart disease).  DISCUSS WITH YOUR CAREGIVER:  · If you are at risk for coronary heart disease, discuss the potential benefits and risks of alcohol use: Light to moderate drinking is associated with lower rates of coronary heart disease in certain populations (for example, men over age 45 and postmenopausal women). Infrequent or nondrinkers are advised not to begin light to moderate drinking to reduce the risk of coronary heart disease so as to avoid creating an alcohol-related problem. Similar protective effects can likely be gained through proper diet and exercise.  · Women and the elderly have smaller amounts of body water than men. As a result women and the elderly achieve a higher blood alcohol concentration after drinking the same amount of alcohol.  · Exposing a fetus to alcohol can cause a broad range of birth defects referred to as Fetal Alcohol Syndrome (FAS) or Alcohol-Related Birth Defects (ARBD). Although FAS/ARBD is connected with excessive  alcohol consumption during pregnancy, studies also have reported neurobehavioral problems in infants born to mothers reporting drinking an average of 1 drink per day during pregnancy.  · Heavier drinking (the consumption of more than 4 drinks per occasion by men and more than 3 drinks per occasion by women) impairs learning (cognitive) and psychomotor functions and increases the risk of alcohol-related problems, including accidents and injuries.  CAGE QUESTIONS:   · Have you ever felt that you should Cut down on your drinking?  · Have people Annoyed you by criticizing your drinking?  · Have you ever felt bad or Guilty about your drinking?  · Have you ever had a drink first thing in the morning to steady your nerves or get rid of a hangover (Eye opener)?  If you answered positively to any of these questions: You may be at risk for alcohol-related problems if alcohol consumption is:   · Men: Greater than 14 drinks per week or more than 4 drinks per occasion.  · Women: Greater than 7 drinks per week or more than 3 drinks per occasion.  Do you or your family have a medical history of alcohol-related problems, such as:  · Blackouts.  · Sexual dysfunction.  · Depression.  · Trauma.  · Liver dysfunction.  · Sleep disorders.  · Hypertension.  · Chronic abdominal pain.  · Has your drinking ever caused you problems, such as problems with your family, problems with your work (or school) performance, or accidents/injuries?  · Do you have a compulsion to drink or a preoccupation with drinking?  · Do you have poor control or are you unable to stop drinking once you have started?  · Do you have to drink to avoid withdrawal symptoms?  · Do you have problems with withdrawal such as tremors, nausea, sweats, or mood disturbances?  · Does it take more alcohol than in the past to get you high?  · Do you feel a strong urge to drink?  · Do you change your plans so that you can have a drink?  · Do you ever drink in the morning to relieve  the shakes or a hangover?  If you have answered a number of the previous questions positively, it may be time for you to talk to your caregivers, family, and friends and see if they think you have a problem. Alcoholism is a chemical dependency that keeps getting worse and will eventually destroy your health and relationships. Many alcoholics end up dead, impoverished, or in custodial. This is often the end result of all chemical dependency.  · Do not be discouraged if you are not ready to take action immediately.  · Decisions to change behavior often involve up and down desires to change and feeling like you cannot decide.  · Try to think more seriously about your drinking behavior.  · Think of the reasons to quit.  WHERE TO GO FOR ADDITIONAL INFORMATION   · The National [place] on Alcohol Abuse and Alcoholism (NIAAA)  www.niaaa.nih.gov  · National [place] on Alcoholism and Drug Dependence (NCADD)  www.ncadd.org  · American Society of Addiction Medicine (ASAM)  www.asam.org   Document Released: 12/18/2006 Document Revised: 03/11/2013 Document Reviewed: 08/05/2009  ExitCare® Patient Information ©2014 [employer].

## 2018-07-27 NOTE — ED NOTES
Patient ambulatory to bathroom, no assist needed.     Discharge instructions reviewed, no questions at this time.     Belongings returned to patient.    Patient ambulatory off unit.

## 2018-07-27 NOTE — ED TRIAGE NOTES
Patient c/o being dehydrated, admits to etoh, states  at [facility]'s told her to taper her alcohol consumption. Breathalyzer 0.244 at triage, given glass of water, to lobby, patient has checked in once already today, explained that she must wait for room, and not to leave if she wants to be seen

## 2018-07-27 NOTE — ED TRIAGE NOTES
Pt laying on floor in lobby, uncooperative. Pt is visibly intoxicated with heavily slurred speech. Pt states she is here for ETOH detox, no other medical complaints at this time. Per chart review pt was seen here yesterday for the same.      Chief Complaint   Patient presents with   • Alcohol Intoxication     Pt placed in lobby in direct view of the ED tech. Pt educated to notified RN or triage tech if changes in condition.

## 2018-07-27 NOTE — ED NOTES
Pt resting in room with eyes closed. Chest rise and fall noted. Vitals WNL at this time, on 2 L NC.

## 2018-07-28 ENCOUNTER — APPOINTMENT (OUTPATIENT)
Dept: RADIOLOGY | Facility: MEDICAL CENTER | Age: 58
End: 2018-07-28
Attending: EMERGENCY MEDICINE
Payer: MEDICAID

## 2018-07-28 ENCOUNTER — HOSPITAL ENCOUNTER (EMERGENCY)
Facility: MEDICAL CENTER | Age: 58
End: 2018-07-28
Attending: EMERGENCY MEDICINE
Payer: MEDICAID

## 2018-07-28 ENCOUNTER — HOSPITAL ENCOUNTER (EMERGENCY)
Dept: HOSPITAL 8 - ED | Age: 58
Discharge: HOME | End: 2018-07-28
Payer: MEDICAID

## 2018-07-28 VITALS
HEART RATE: 83 BPM | SYSTOLIC BLOOD PRESSURE: 124 MMHG | TEMPERATURE: 97.8 F | OXYGEN SATURATION: 96 % | DIASTOLIC BLOOD PRESSURE: 61 MMHG | BODY MASS INDEX: 30.53 KG/M2 | RESPIRATION RATE: 16 BRPM | WEIGHT: 190 LBS | HEIGHT: 66 IN

## 2018-07-28 VITALS — HEIGHT: 65 IN | BODY MASS INDEX: 31.59 KG/M2 | WEIGHT: 189.6 LBS

## 2018-07-28 VITALS — DIASTOLIC BLOOD PRESSURE: 77 MMHG | SYSTOLIC BLOOD PRESSURE: 127 MMHG

## 2018-07-28 DIAGNOSIS — F32.9: ICD-10-CM

## 2018-07-28 DIAGNOSIS — E87.6 HYPOKALEMIA: ICD-10-CM

## 2018-07-28 DIAGNOSIS — F43.10: ICD-10-CM

## 2018-07-28 DIAGNOSIS — I10: ICD-10-CM

## 2018-07-28 DIAGNOSIS — K21.9: ICD-10-CM

## 2018-07-28 DIAGNOSIS — F10.929 ALCOHOLIC INTOXICATION WITH COMPLICATION (HCC): ICD-10-CM

## 2018-07-28 DIAGNOSIS — F10.120: Primary | ICD-10-CM

## 2018-07-28 LAB
ANION GAP SERPL CALC-SCNC: 13 MMOL/L (ref 0–11.9)
BUN SERPL-MCNC: 10 MG/DL (ref 8–22)
CALCIUM SERPL-MCNC: 9 MG/DL (ref 8.5–10.5)
CHLORIDE SERPL-SCNC: 105 MMOL/L (ref 96–112)
CO2 SERPL-SCNC: 25 MMOL/L (ref 20–33)
CREAT SERPL-MCNC: 0.66 MG/DL (ref 0.5–1.4)
ETHANOL BLD-MCNC: 0.38 G/DL
GLUCOSE SERPL-MCNC: 117 MG/DL (ref 65–99)
POC BREATHALIZER: 0.26 PERCENT (ref 0–0.01)
POTASSIUM SERPL-SCNC: 3.1 MMOL/L (ref 3.6–5.5)
SODIUM SERPL-SCNC: 143 MMOL/L (ref 135–145)

## 2018-07-28 PROCEDURE — 96365 THER/PROPH/DIAG IV INF INIT: CPT

## 2018-07-28 PROCEDURE — 302970 POC BREATHALIZER: Performed by: EMERGENCY MEDICINE

## 2018-07-28 PROCEDURE — 72125 CT NECK SPINE W/O DYE: CPT

## 2018-07-28 PROCEDURE — 99283 EMERGENCY DEPT VISIT LOW MDM: CPT

## 2018-07-28 PROCEDURE — 70450 CT HEAD/BRAIN W/O DYE: CPT

## 2018-07-28 PROCEDURE — 99285 EMERGENCY DEPT VISIT HI MDM: CPT

## 2018-07-28 PROCEDURE — 80048 BASIC METABOLIC PNL TOTAL CA: CPT

## 2018-07-28 PROCEDURE — 700111 HCHG RX REV CODE 636 W/ 250 OVERRIDE (IP): Performed by: EMERGENCY MEDICINE

## 2018-07-28 PROCEDURE — 80307 DRUG TEST PRSMV CHEM ANLYZR: CPT

## 2018-07-28 PROCEDURE — 72170 X-RAY EXAM OF PELVIS: CPT

## 2018-07-28 RX ORDER — POTASSIUM CHLORIDE 7.45 MG/ML
10 INJECTION INTRAVENOUS ONCE
Status: COMPLETED | OUTPATIENT
Start: 2018-07-28 | End: 2018-07-28

## 2018-07-28 RX ORDER — POTASSIUM CHLORIDE 20 MEQ/1
20 TABLET, EXTENDED RELEASE ORAL 2 TIMES DAILY
Qty: 20 TAB | Refills: 0 | Status: SHIPPED | OUTPATIENT
Start: 2018-07-28 | End: 2018-08-07

## 2018-07-28 RX ADMIN — POTASSIUM CHLORIDE 10 MEQ: 7.46 INJECTION, SOLUTION INTRAVENOUS at 15:45

## 2018-07-28 ASSESSMENT — LIFESTYLE VARIABLES: DO YOU DRINK ALCOHOL: NO

## 2018-07-28 NOTE — ED NOTES
Patient noted to be sitting in bedside chair screaming unintelligible noises at the top of her lungs. Refusing to converse with staff upon entering room.

## 2018-07-28 NOTE — ED NOTES
Patient ambulatory in hallway to BR under SBA by ED staff with steady gait. Conversing appropriately with staff at this time.

## 2018-07-28 NOTE — ED NOTES
Patient to [place] [id number] via hospital bed accompanied by ED RN. Report received at bedside from TETO Galeano.    Patient sleeping in L lateral position. Active chest rise and fall noted. No agitation or behavioral pain indicators noted. Call bell within reach.

## 2018-07-28 NOTE — ED NOTES
"Patient ambulatory with steady gait to nursing station with all of her belongings yelling \"I'm fucking leaving. You guys aren't doing anything to fucking help me.\" Refusing to wait to speak with ERP. Ambulatory with steady gait to lobby with all of belongings. Discussed with Charge TETO Gaytan.  "

## 2018-07-28 NOTE — ED PROVIDER NOTES
ED Provider Note    Scribed for Catherine Chatman M.D. by Tiffany aVng. 7/27/2018, 8:49 PM.    Primary care provider: Pcp Pt States None  Means of arrival: Walk in   History obtained from: Patient  History limited by: Altered mental status     CHIEF COMPLAINT  Chief Complaint   Patient presents with   • Alcohol Intoxication       HPI  Ashleigh Marquis is a 57 y.o. female who presents to the Emergency Department for alcohol intoxification. She reports being dehydrated at requests for water and food. Patient denies vomiting and is able to tolerate food and fluids. She reports consulting [facility] previously for alcohol detoxification.     HPI limited by patient's altered mental status secondary to alcohol intoxification.     REVIEW OF SYSTEMS  Pertinent positives include alcohol intoxification.   Pertinent negatives include no vomiting.    C.     ROS limited by patient's altered mental status secondary to alcohol intoxification.     PAST MEDICAL HISTORY   has a past medical history of Alcoholism ([facility]); Anxiety; Arthritis; ASTHMA; Cancer ([facility]) (1981); Chronic low back pain; Congestive heart failure ([facility]); Depression; EtOH dependence ([facility]); Fall; GERD (gastroesophageal reflux disease); HTN; Hypertension; Indigestion; Muscle disorder; OSTEOPOROSIS; Other specified symptom associated with female genital organs; Psychiatric disorder; PTSD (post-traumatic stress disorder); Renal disorder; Seizure ([facility]); Ulcer; and Vitamin D deficiency.    SURGICAL HISTORY   has a past surgical history that includes hernia repair (1977); cysto stent placemnt pre surg (10/7/2010); cystoscopy stent placement (11/9/2010); ureteroscopy (11/9/2010); lasertripsy (11/9/2010); and stent removal (11/9/2010).    SOCIAL HISTORY  Social History   Substance Use Topics   • Smoking status: Current Every Day Smoker     Packs/day: 0.50     Years: 20.00     Types: Cigarettes   • Smokeless tobacco: Never Used      Comment: 1/2 pack per day   • Alcohol use Yes     "  Comment: \"lots of vodka\" currently intoxicated      History   Drug Use No       FAMILY HISTORY  Family History   Problem Relation Age of Onset   • Heart Disease Father    • Hypertension Father    • Diabetes Father    • Cancer Paternal Grandmother    • Depression Other    • Lung Disease Neg Hx    • Stroke Neg Hx        CURRENT MEDICATIONS  Home Medications     Reviewed by Waleska Tai R.N. (Registered Nurse) on 07/27/18 at 2048  Med List Status: <None>   Medication Last Dose Status   acetaminophen (TYLENOL) 325 MG Tab  Active   benzocaine (ANBESOL) 10 % Gel  Active   doxepin (SINEQUAN) 50 MG Cap unknown Active   fluoxetine (PROZAC) 40 MG capsule unknown Active   ibuprofen (MOTRIN) 800 MG Tab  Active   lisinopril (PRINIVIL) 10 MG Tab unknown Active   loperamide (IMODIUM) 2 MG Cap  Active   lorazepam (ATIVAN) 1 MG Tab unknown Active   nystatin (MYCOSTATIN) 023549 UNIT/ML Suspension  Active   potassium chloride SA (KDUR) 20 MEQ Tab CR unknown Active   spironolactone (ALDACTONE) 25 MG Tab unknown Active                ALLERGIES  Allergies   Allergen Reactions   • Sulfa Drugs Vomiting   • Toradol Vomiting   • Neurontin [Gabapentin]      Bowel incontinence         PHYSICAL EXAM  VITAL SIGNS: /88   Pulse 98   Temp 36.6 °C (97.8 °F)   Resp 17   Wt 86.4 kg (190 lb 7.6 oz)   LMP 11/02/2015   SpO2 92%   BMI 31.70 kg/m²     Constitutional: Intoxicated in no apparent distress.  Disheveled  HENT: No signs of trauma, Bilateral external ears normal, Nose normal.   Eyes: Pupils are equal and reactive, Conjunctiva normal, Non-icteric.   Cardiovascular: Regular rate and rhythm, no murmurs.   Thorax & Lungs: Normal breath sounds, No respiratory distress, No wheezing, No chest tenderness.   Abdomen: Bowel sounds normal, Soft, No tenderness  Skin: Warm, Dry, No erythema, No rash.   Musculoskeletal:  No major deformities noted.  Neurologic: Moving all extremities without difficulty, no focal deficits.    LABS  Labs Reviewed - " No data to display  All labs reviewed by me.    COURSE & MEDICAL DECISION MAKING  Pertinent Labs & Imaging studies reviewed. (See chart for details)    Differential diagnoses include but are not limited to: Alcohol intoxication    8:49 PM - Patient seen and examined at bedside. Patient will be treated with 1 tab multivitamin, 100 mg thiamine, and 1 mg folic acid.  Patient is able to take p.o. I do not see any indication for her to get IV fluids.  She has a long history of alcoholism and will be allowed to metabolize and be reevaluated when she is sober.    10:18 PM Per nurse note, patient became combative and was screaming she would not wait to speak with ERP.  She was stable and ambulatory at that time. Her vitals prior to leaving was: /88   Pulse 98   Temp 36.6 °C (97.8 °F)   Resp 17   Wt 86.4 kg (190 lb 7.6 oz)   LMP 11/02/2015   SpO2 92%   BMI 31.70 kg/m²      DISPOSITION:  Patient eloped    FOLLOW UP:  No follow-up provider specified.    OUTPATIENT MEDICATIONS:  Discharge Medication List as of 7/27/2018 10:20 PM        FINAL IMPRESSION  1. Alcoholic intoxication without complication (HCC)       Patient eloped    This dictation has been created using voice recognition software and/or scribes. The accuracy of the dictation is limited by the abilities of the software and the expertise of the scribes. I expect there may be some errors of grammar and possibly content. I made every attempt to manually correct the errors within my dictation. However, errors related to voice recognition software and/or scribes may still exist and should be interpreted within the appropriate context.    I, Tiffany Vang (Scribe), am scribing for, and in the presence of, Catherine Chatman M.D..  Electronically signed by: Tiffany Vang (Scribe), 7/27/2018  Catherine GAN M.D. personally performed the services described in this documentation, as scribed by Tiffany Vang in my presence, and it is both accurate and complete.    The  note accurately reflects work and decisions made by me.  Catherine Chatman  7/28/2018  4:00 AM

## 2018-07-29 ENCOUNTER — PATIENT OUTREACH (OUTPATIENT)
Dept: HEALTH INFORMATION MANAGEMENT | Facility: OTHER | Age: 58
End: 2018-07-29

## 2018-07-29 NOTE — DISCHARGE INSTRUCTIONS
Alcohol Problems  Most adults who drink alcohol drink in moderation (not a lot) are at low risk for developing problems related to their drinking. However, all drinkers, including low-risk drinkers, should know about the health risks connected with drinking alcohol.  RECOMMENDATIONS FOR LOW-RISK DRINKING   Drink in moderation. Moderate drinking is defined as follows:   · Men - no more than 2 drinks per day.  · Nonpregnant women - no more than 1 drink per day.  · Over age 65 - no more than 1 drink per day.  A standard drink is 12 grams of pure alcohol, which is equal to a 12 ounce bottle of beer or wine cooler, a 5 ounce glass of wine, or 1.5 ounces of distilled spirits (such as whiskey, gama, vodka, or rum).   ABSTAIN FROM (DO NOT DRINK) ALCOHOL:  · When pregnant or considering pregnancy.  · When taking a medication that interacts with alcohol.  · If you are alcohol dependent.  · A medical condition that prohibits drinking alcohol (such as ulcer, liver disease, or heart disease).  DISCUSS WITH YOUR CAREGIVER:  · If you are at risk for coronary heart disease, discuss the potential benefits and risks of alcohol use: Light to moderate drinking is associated with lower rates of coronary heart disease in certain populations (for example, men over age 45 and postmenopausal women). Infrequent or nondrinkers are advised not to begin light to moderate drinking to reduce the risk of coronary heart disease so as to avoid creating an alcohol-related problem. Similar protective effects can likely be gained through proper diet and exercise.  · Women and the elderly have smaller amounts of body water than men. As a result women and the elderly achieve a higher blood alcohol concentration after drinking the same amount of alcohol.  · Exposing a fetus to alcohol can cause a broad range of birth defects referred to as Fetal Alcohol Syndrome (FAS) or Alcohol-Related Birth Defects (ARBD). Although FAS/ARBD is connected with  excessive alcohol consumption during pregnancy, studies also have reported neurobehavioral problems in infants born to mothers reporting drinking an average of 1 drink per day during pregnancy.  · Heavier drinking (the consumption of more than 4 drinks per occasion by men and more than 3 drinks per occasion by women) impairs learning (cognitive) and psychomotor functions and increases the risk of alcohol-related problems, including accidents and injuries.  CAGE QUESTIONS:   · Have you ever felt that you should Cut down on your drinking?  · Have people Annoyed you by criticizing your drinking?  · Have you ever felt bad or Guilty about your drinking?  · Have you ever had a drink first thing in the morning to steady your nerves or get rid of a hangover (Eye opener)?  If you answered positively to any of these questions: You may be at risk for alcohol-related problems if alcohol consumption is:   · Men: Greater than 14 drinks per week or more than 4 drinks per occasion.  · Women: Greater than 7 drinks per week or more than 3 drinks per occasion.  Do you or your family have a medical history of alcohol-related problems, such as:  · Blackouts.  · Sexual dysfunction.  · Depression.  · Trauma.  · Liver dysfunction.  · Sleep disorders.  · Hypertension.  · Chronic abdominal pain.  · Has your drinking ever caused you problems, such as problems with your family, problems with your work (or school) performance, or accidents/injuries?  · Do you have a compulsion to drink or a preoccupation with drinking?  · Do you have poor control or are you unable to stop drinking once you have started?  · Do you have to drink to avoid withdrawal symptoms?  · Do you have problems with withdrawal such as tremors, nausea, sweats, or mood disturbances?  · Does it take more alcohol than in the past to get you high?  · Do you feel a strong urge to drink?  · Do you change your plans so that you can have a drink?  · Do you ever drink in the morning to  relieve the shakes or a hangover?  If you have answered a number of the previous questions positively, it may be time for you to talk to your caregivers, family, and friends and see if they think you have a problem. Alcoholism is a chemical dependency that keeps getting worse and will eventually destroy your health and relationships. Many alcoholics end up dead, impoverished, or in retirement. This is often the end result of all chemical dependency.  · Do not be discouraged if you are not ready to take action immediately.  · Decisions to change behavior often involve up and down desires to change and feeling like you cannot decide.  · Try to think more seriously about your drinking behavior.  · Think of the reasons to quit.  WHERE TO GO FOR ADDITIONAL INFORMATION   · The National [place] on Alcohol Abuse and Alcoholism (NIAAA)  www.niaaa.nih.gov  · National [place] on Alcoholism and Drug Dependence (NCADD)  www.ncadd.org  · American Society of Addiction Medicine (ASAM)  www.asam.org   Document Released: 12/18/2006 Document Revised: 03/11/2013 Document Reviewed: 08/05/2009  ExitCare® Patient Information ©2014 [employer].    Reduce your alcohol consumption, call the [facility] clinic Monday morning and arrange primary care follow-up and reevaluation as soon as possible during the week.  Return here if you have new or worsening symptoms or need emergency medical care.

## 2018-07-29 NOTE — PROGRESS NOTES
Placed discharge outreach phone call to patient s/p ER discharge 7/28/18.  Left voicemail providing my contact information and instructions to call with any questions or concerns.

## 2018-07-29 NOTE — ED PROVIDER NOTES
"ED Provider Note    CHIEF COMPLAINT  Chief Complaint   Patient presents with   • ETOH Withdrawal       HPI  Ashleigh Marquis is a 57 y.o. female who presents to the emergency department brought in by ambulance for alcohol intoxication.  [place]  The patient complains that she fell and sustained a scratch to her right gluteal area.  She has no other specific complaint and she states that the time of arrival \"I have just been drinking too much\"    REVIEW OF SYSTEMS the patient says she has not recently been ill no nausea or vomiting no chest pain or difficulty breathing.  All other systems negative    PAST MEDICAL HISTORY  Past Medical History:   Diagnosis Date   • Alcoholism (HCC)    • Anxiety    • Arthritis    • ASTHMA    • Cancer (HCC) 1981    cervical   • Chronic low back pain    • Congestive heart failure (HCC)    • Depression    • EtOH dependence (HCC)    • Fall     alcohol related   • GERD (gastroesophageal reflux disease)    • HTN    • Hypertension    • Indigestion     GERD   • Muscle disorder    • OSTEOPOROSIS    • Other specified symptom associated with female genital organs     \"I have fibroids bad\"\"   • Psychiatric disorder    • PTSD (post-traumatic stress disorder)    • Renal disorder     Hx of stones   • Seizure (HCC)     several years ago r/t alcohol withdrawl   • Ulcer    • Vitamin D deficiency        FAMILY HISTORY  Family History   Problem Relation Age of Onset   • Heart Disease Father    • Hypertension Father    • Diabetes Father    • Cancer Paternal Grandmother    • Depression Other    • Lung Disease Neg Hx    • Stroke Neg Hx        SOCIAL HISTORY  Social History     Social History   • Marital status:      Spouse name: N/A   • Number of children: N/A   • Years of education: N/A     Social History Main Topics   • Smoking status: Current Every Day Smoker     Packs/day: 0.50     Years: 20.00     Types: Cigarettes   • Smokeless " "tobacco: Never Used      Comment: 1/2 pack per day   • Alcohol use Yes      Comment: \"lots of vodka\" currently intoxicated   • Drug use: No   • Sexual activity: No     Other Topics Concern   • Not on file     Social History Narrative    ** Merged History Encounter **            SURGICAL HISTORY  Past Surgical History:   Procedure Laterality Date   • CYSTOSCOPY STENT PLACEMENT  11/9/2010    Performed by ANGEL HARRIS at SUR[facility]ERY [facility]   • URETEROSCOPY  11/9/2010    Performed by ANGEL HARRIS at SUR[facility]ERY [facility]   • LASERTRIPSY  11/9/2010    Performed by ANGEL HARRIS at SUR[facility]ERY [facility]   • STENT REMOVAL  11/9/2010    Performed by ANGEL HARRIS at SUR[facility]ERY [facility]   • CYSTO STENT PLACEMNT PRE SURG  10/7/2010    Performed by ANGEL HARRIS at SUR[facility]ERY [facility]   • HERNIA REPAIR  1977       CURRENT MEDICATIONS  Home Medications    **Home medications have not yet been reviewed for this encounter**         ALLERGIES  Allergies   Allergen Reactions   • Sulfa Drugs Vomiting   • Toradol Vomiting   • Neurontin [Gabapentin]      Bowel incontinence         PHYSICAL EXAM  VITAL SIGNS: /69   Pulse 90   Temp 36.6 °C (97.8 °F)   Resp 18   Ht 1.676 m (5' 6\")   Wt 86.2 kg (190 lb)   LMP 11/02/2015   BMI 30.67 kg/m²    Oxygen saturation is interpreted as adequate  Constitutional: Patient is awake and verbal she is slurring her speech and poorly coordinated consistent with intoxication  HENT: There are some bruises and abrasions on the left side of the forehead which actually look subacute not brand-new but the patient cannot explain how they got there.  Eyes: Pupils round extraocular motions present  Neck: Trachea midline no JVD, no reproducible tenderness of the cervical spine but subjectively the patient complained of neck discomfort.  Cardiovascular: Regular rate and rhythm  Lungs: Clear and equal bilaterally with no apparent difficulty breathing  Abdomen/Back: Obese soft " nontender no rebound guarding or peritoneal findings no tenderness of the thoracic or lumbar spine  Skin: Warm and dry  Musculoskeletal: No acute bony deformity there is a superficial abrasion over the right gluteal area that is about 8 inches long  Neurologic: Awake to verbal moving all extremities poorly coordinated consistent with intoxication    Laboratory  Basic metabolic panel showed a slightly low potassium of 3.1 blood alcohol level was extremely elevated at 0.38    Radiology  CT-CSPINE WITHOUT PLUS RECONS   Final Result      1.  There is no acute fracture of the cervical spine.   2.  Multilevel degenerative change is again seen and without interval change compared with the very recent prior exam.   3.  Hypodense thyroid nodules are unchanged.         CT-HEAD W/O   Final Result      1.  Head CT without contrast within normal limits. No evidence of acute cerebral infarction, hemorrhage or mass lesion.      DX-PELVIS-1 OR 2 VIEWS   Final Result      No fracture or dislocation is seen.        MEDICAL DECISION MAKING and DISPOSITION  In the emergency department an IV was established and the patient is receiving intravenous supplemental potassium for hypokalemia.  The patient is going to need several hours to sober up and then she will require reevaluation.  We will provide supportive care while the patient is in the ER and the patient's care will be signed out to the oncoming ER doctor pending her sobriety and reevaluation.  I have written her a prescription for oral supplemental potassium.    IMPRESSION  1.  Alcohol intoxication  2.  Abrasion to the right gluteal area  3.  Hypokalemia         Electronically signed by: Frederick Mckinney, 7/28/2018 5:01 PM

## 2018-07-29 NOTE — ED NOTES
Assumed care of patient. Report received from shay hoang. Patient sleeping comfortably in [facility]. VSS NAD

## 2018-07-29 NOTE — ED NOTES
Gave report to shay Jasso. No questions at this time. Pt is stable at this time. RR E/U. Pt in NAD. Skin WDI.

## 2018-07-31 NOTE — ADDENDUM NOTE
Encounter addended by: Trevor De Guzman M.D. on: 7/31/2018  7:36 AM<BR>    Actions taken: Delete clinical note, Problem List modified, Sign clinical note

## 2018-08-01 NOTE — ADDENDUM NOTE
Encounter addended by: Guillermo Jim M.D. on: 8/1/2018 11:24 AM<BR>    Actions taken: Charge Capture section accepted

## 2018-08-10 ENCOUNTER — HOSPITAL ENCOUNTER (EMERGENCY)
Facility: MEDICAL CENTER | Age: 58
End: 2018-08-11
Attending: EMERGENCY MEDICINE
Payer: MEDICAID

## 2018-08-10 DIAGNOSIS — E87.6 HYPOKALEMIA: ICD-10-CM

## 2018-08-10 DIAGNOSIS — F10.920 ALCOHOLIC INTOXICATION WITHOUT COMPLICATION (HCC): ICD-10-CM

## 2018-08-10 PROCEDURE — 99285 EMERGENCY DEPT VISIT HI MDM: CPT

## 2018-08-11 ENCOUNTER — HOSPITAL ENCOUNTER (EMERGENCY)
Facility: MEDICAL CENTER | Age: 58
End: 2018-08-11
Attending: EMERGENCY MEDICINE
Payer: MEDICAID

## 2018-08-11 VITALS
SYSTOLIC BLOOD PRESSURE: 117 MMHG | BODY MASS INDEX: 33.75 KG/M2 | HEIGHT: 66 IN | HEART RATE: 87 BPM | RESPIRATION RATE: 15 BRPM | WEIGHT: 210 LBS | OXYGEN SATURATION: 96 % | DIASTOLIC BLOOD PRESSURE: 85 MMHG | TEMPERATURE: 98 F

## 2018-08-11 VITALS
BODY MASS INDEX: 29.05 KG/M2 | OXYGEN SATURATION: 98 % | SYSTOLIC BLOOD PRESSURE: 108 MMHG | WEIGHT: 180 LBS | HEART RATE: 89 BPM | DIASTOLIC BLOOD PRESSURE: 66 MMHG | RESPIRATION RATE: 14 BRPM | TEMPERATURE: 97.7 F

## 2018-08-11 DIAGNOSIS — F10.920 ALCOHOLIC INTOXICATION WITHOUT COMPLICATION (HCC): ICD-10-CM

## 2018-08-11 LAB
ANION GAP SERPL CALC-SCNC: 12 MMOL/L (ref 0–11.9)
APAP SERPL-MCNC: <10 UG/ML (ref 10–30)
BUN SERPL-MCNC: 10 MG/DL (ref 8–22)
CALCIUM SERPL-MCNC: 8.7 MG/DL (ref 8.5–10.5)
CHLORIDE SERPL-SCNC: 107 MMOL/L (ref 96–112)
CO2 SERPL-SCNC: 22 MMOL/L (ref 20–33)
CREAT SERPL-MCNC: 0.8 MG/DL (ref 0.5–1.4)
ETHANOL BLD-MCNC: 0.35 G/DL
GLUCOSE SERPL-MCNC: 96 MG/DL (ref 65–99)
POTASSIUM SERPL-SCNC: 2.9 MMOL/L (ref 3.6–5.5)
SALICYLATES SERPL-MCNC: 0 MG/DL (ref 15–25)
SODIUM SERPL-SCNC: 141 MMOL/L (ref 135–145)

## 2018-08-11 PROCEDURE — 700111 HCHG RX REV CODE 636 W/ 250 OVERRIDE (IP): Performed by: EMERGENCY MEDICINE

## 2018-08-11 PROCEDURE — A9270 NON-COVERED ITEM OR SERVICE: HCPCS | Performed by: EMERGENCY MEDICINE

## 2018-08-11 PROCEDURE — 304561 HCHG STAT O2

## 2018-08-11 PROCEDURE — 96368 THER/DIAG CONCURRENT INF: CPT

## 2018-08-11 PROCEDURE — 700101 HCHG RX REV CODE 250: Performed by: EMERGENCY MEDICINE

## 2018-08-11 PROCEDURE — 80307 DRUG TEST PRSMV CHEM ANLYZR: CPT

## 2018-08-11 PROCEDURE — 96366 THER/PROPH/DIAG IV INF ADDON: CPT

## 2018-08-11 PROCEDURE — 80048 BASIC METABOLIC PNL TOTAL CA: CPT

## 2018-08-11 PROCEDURE — 36415 COLL VENOUS BLD VENIPUNCTURE: CPT

## 2018-08-11 PROCEDURE — 96365 THER/PROPH/DIAG IV INF INIT: CPT

## 2018-08-11 PROCEDURE — 700102 HCHG RX REV CODE 250 W/ 637 OVERRIDE(OP): Performed by: EMERGENCY MEDICINE

## 2018-08-11 PROCEDURE — 99284 EMERGENCY DEPT VISIT MOD MDM: CPT

## 2018-08-11 RX ORDER — OMEPRAZOLE 20 MG/1
20 CAPSULE, DELAYED RELEASE ORAL ONCE
Status: COMPLETED | OUTPATIENT
Start: 2018-08-11 | End: 2018-08-11

## 2018-08-11 RX ORDER — POTASSIUM CHLORIDE 20 MEQ/1
40 TABLET, EXTENDED RELEASE ORAL ONCE
Status: COMPLETED | OUTPATIENT
Start: 2018-08-11 | End: 2018-08-11

## 2018-08-11 RX ORDER — ACETAMINOPHEN 325 MG/1
1000 TABLET ORAL ONCE
Status: COMPLETED | OUTPATIENT
Start: 2018-08-11 | End: 2018-08-11

## 2018-08-11 RX ORDER — POTASSIUM CHLORIDE 7.45 MG/ML
10 INJECTION INTRAVENOUS ONCE
Status: COMPLETED | OUTPATIENT
Start: 2018-08-11 | End: 2018-08-11

## 2018-08-11 RX ADMIN — THIAMINE HYDROCHLORIDE: 100 INJECTION, SOLUTION INTRAMUSCULAR; INTRAVENOUS at 00:45

## 2018-08-11 RX ADMIN — POTASSIUM CHLORIDE 40 MEQ: 1500 TABLET, EXTENDED RELEASE ORAL at 05:12

## 2018-08-11 RX ADMIN — POTASSIUM CHLORIDE 10 MEQ: 7.46 INJECTION, SOLUTION INTRAVENOUS at 01:55

## 2018-08-11 RX ADMIN — OMEPRAZOLE 20 MG: 20 CAPSULE, DELAYED RELEASE ORAL at 05:25

## 2018-08-11 RX ADMIN — ACETAMINOPHEN 975 MG: 325 TABLET, FILM COATED ORAL at 05:25

## 2018-08-11 ASSESSMENT — PAIN SCALES - GENERAL
PAINLEVEL_OUTOF10: 6
PAINLEVEL_OUTOF10: 0

## 2018-08-11 NOTE — ED NOTES
Attempted to ambulate pt, pt unable to ambulate without assistance, pt wobbly. Pt placed back in bed. Will reassess.

## 2018-08-11 NOTE — ED NOTES
Pt placed on bedpan and off, pt given boxed lunch, medicated per Mar. Pt denies any further needs.

## 2018-08-11 NOTE — DISCHARGE INSTRUCTIONS
You were seen in the Emergency Department for alcohol intoxication.    Labs were completed without significant acute abnormalities other than low potassium.    Please use 1,000mg of tylenol or 600mg of ibuprofen every 6 hours as needed for pain.    Please follow up with your primary care physician.    Return to the Emergency Department with severe headaches, confusion, vomiting, or other concerns.      Alcohol Problems  Most adults who drink alcohol drink in moderation (not a lot) are at low risk for developing problems related to their drinking. However, all drinkers, including low-risk drinkers, should know about the health risks connected with drinking alcohol.  RECOMMENDATIONS FOR LOW-RISK DRINKING   Drink in moderation. Moderate drinking is defined as follows:   · Men - no more than 2 drinks per day.  · Nonpregnant women - no more than 1 drink per day.  · Over age 65 - no more than 1 drink per day.  A standard drink is 12 grams of pure alcohol, which is equal to a 12 ounce bottle of beer or wine cooler, a 5 ounce glass of wine, or 1.5 ounces of distilled spirits (such as whiskey, gama, vodka, or rum).   ABSTAIN FROM (DO NOT DRINK) ALCOHOL:  · When pregnant or considering pregnancy.  · When taking a medication that interacts with alcohol.  · If you are alcohol dependent.  · A medical condition that prohibits drinking alcohol (such as ulcer, liver disease, or heart disease).  DISCUSS WITH YOUR CAREGIVER:  · If you are at risk for coronary heart disease, discuss the potential benefits and risks of alcohol use: Light to moderate drinking is associated with lower rates of coronary heart disease in certain populations (for example, men over age 45 and postmenopausal women). Infrequent or nondrinkers are advised not to begin light to moderate drinking to reduce the risk of coronary heart disease so as to avoid creating an alcohol-related problem. Similar protective effects can likely be gained through proper diet  and exercise.  · Women and the elderly have smaller amounts of body water than men. As a result women and the elderly achieve a higher blood alcohol concentration after drinking the same amount of alcohol.  · Exposing a fetus to alcohol can cause a broad range of birth defects referred to as Fetal Alcohol Syndrome (FAS) or Alcohol-Related Birth Defects (ARBD). Although FAS/ARBD is connected with excessive alcohol consumption during pregnancy, studies also have reported neurobehavioral problems in infants born to mothers reporting drinking an average of 1 drink per day during pregnancy.  · Heavier drinking (the consumption of more than 4 drinks per occasion by men and more than 3 drinks per occasion by women) impairs learning (cognitive) and psychomotor functions and increases the risk of alcohol-related problems, including accidents and injuries.  CAGE QUESTIONS:   · Have you ever felt that you should Cut down on your drinking?  · Have people Annoyed you by criticizing your drinking?  · Have you ever felt bad or Guilty about your drinking?  · Have you ever had a drink first thing in the morning to steady your nerves or get rid of a hangover (Eye opener)?  If you answered positively to any of these questions: You may be at risk for alcohol-related problems if alcohol consumption is:   · Men: Greater than 14 drinks per week or more than 4 drinks per occasion.  · Women: Greater than 7 drinks per week or more than 3 drinks per occasion.  Do you or your family have a medical history of alcohol-related problems, such as:  · Blackouts.  · Sexual dysfunction.  · Depression.  · Trauma.  · Liver dysfunction.  · Sleep disorders.  · Hypertension.  · Chronic abdominal pain.  · Has your drinking ever caused you problems, such as problems with your family, problems with your work (or school) performance, or accidents/injuries?  · Do you have a compulsion to drink or a preoccupation with drinking?  · Do you have poor control or are  you unable to stop drinking once you have started?  · Do you have to drink to avoid withdrawal symptoms?  · Do you have problems with withdrawal such as tremors, nausea, sweats, or mood disturbances?  · Does it take more alcohol than in the past to get you high?  · Do you feel a strong urge to drink?  · Do you change your plans so that you can have a drink?  · Do you ever drink in the morning to relieve the shakes or a hangover?  If you have answered a number of the previous questions positively, it may be time for you to talk to your caregivers, family, and friends and see if they think you have a problem. Alcoholism is a chemical dependency that keeps getting worse and will eventually destroy your health and relationships. Many alcoholics end up dead, impoverished, or in snf. This is often the end result of all chemical dependency.  · Do not be discouraged if you are not ready to take action immediately.  · Decisions to change behavior often involve up and down desires to change and feeling like you cannot decide.  · Try to think more seriously about your drinking behavior.  · Think of the reasons to quit.  WHERE TO GO FOR ADDITIONAL INFORMATION   · The National [place] on Alcohol Abuse and Alcoholism (NIAAA)  www.niaaa.nih.gov  · National [place] on Alcoholism and Drug Dependence (NCADD)  www.ncadd.org  · American Society of Addiction Medicine (ASAM)  www.asam.org   Document Released: 12/18/2006 Document Revised: 03/11/2013 Document Reviewed: 08/05/2009  ExitCare® Patient Information ©2014 [facility].      Hypokalemia  Hypokalemia means that the amount of potassium in the blood is lower than normal. Potassium is a chemical that helps regulate the amount of fluid in the body (electrolyte). It also stimulates muscle tightening (contraction) and helps nerves work properly. Normally, most of the body’s potassium is inside of cells, and only a very small amount is in the blood. Because the amount in the blood  is so small, minor changes to potassium levels in the blood can be life-threatening.  What are the causes?  This condition may be caused by:  · Antibiotic medicine.  · Diarrhea or vomiting. Taking too much of a medicine that helps you have a bowel movement (laxative) can cause diarrhea and lead to hypokalemia.  · Chronic kidney disease (CKD).  · Medicines that help the body get rid of excess fluid (diuretics).  · Eating disorders, such as bulimia.  · Low magnesium levels in the body.  · Sweating a lot.  What are the signs or symptoms?  Symptoms of this condition include:  · Weakness.  · Constipation.  · Fatigue.  · Muscle cramps.  · Mental confusion.  · Skipped heartbeats or irregular heartbeat (palpitations).  · Tingling or numbness.  How is this diagnosed?  This condition is diagnosed with a blood test.  How is this treated?  Hypokalemia can be treated by taking potassium supplements by mouth or adjusting the medicines that you take. Treatment may also include eating more foods that contain a lot of potassium. If your potassium level is very low, you may need to get potassium through an IV tube in one of your veins and be monitored in the hospital.  Follow these instructions at home:  · Take over-the-counter and prescription medicines only as told by your health care provider. This includes vitamins and supplements.  · Eat a healthy diet. A healthy diet includes fresh fruits and vegetables, whole grains, healthy fats, and lean proteins.  · If instructed, eat more foods that contain a lot of potassium, such as:  ¨ Nuts, such as peanuts and pistachios.  ¨ Seeds, such as sunflower seeds and pumpkin seeds.  ¨ Peas, lentils, and lima beans.  ¨ Whole grain and bran cereals and breads.  ¨ Fresh fruits and vegetables, such as apricots, avocado, bananas, cantaloupe, kiwi, oranges, tomatoes, asparagus, and potatoes.  ¨ Orange juice.  ¨ Tomato juice.  ¨ Red meats.  ¨ Yogurt.  · Keep all follow-up visits as told by your  health care provider. This is important.  Contact a health care provider if:  · You have weakness that gets worse.  · You feel your heart pounding or racing.  · You vomit.  · You have diarrhea.  · You have diabetes (diabetes mellitus) and you have trouble keeping your blood sugar (glucose) in your target range.  Get help right away if:  · You have chest pain.  · You have shortness of breath.  · You have vomiting or diarrhea that lasts for more than 2 days.  · You faint.  This information is not intended to replace advice given to you by your health care provider. Make sure you discuss any questions you have with your health care provider.  Document Released: 12/18/2006 Document Revised: 08/05/2017 Document Reviewed: 08/05/2017  Elsevier Interactive Patient Education © 2017 [employer] Inc.

## 2018-08-11 NOTE — ED TRIAGE NOTES
"BIBA pt found intoxicated, pt drowsy, pt responds to painful stimuli yelling \"Im alcohol sick.\" Then falls back asleep. BGL 94. EMS stated found a bottle of Ativan filled 2 days ago, 21 pills missing. Pt stated \"a everardo took them.\"   "

## 2018-08-11 NOTE — ED PROVIDER NOTES
"ED Provider Note    Scribed for Aaliyah Connolly M.D. by Jhonathan Brooke. 8/11/2018  12:21 AM    Means of arrival: EMS  History obtained from: Patient, Nurse  History limited by: Patients intoxicated state      CHIEF COMPLAINT  Chief Complaint   Patient presents with   • Alcohol Intoxication     biba found intoxicated. Pt yells \"im alcohol sick.\"        HPI  Ashleigh Marquis is a 57 y.o. female who presents to the Emergency Department after being found sleeping on the street by EMS with alcohol intoxication. Patient is somnolent and difficult to arouse therefore difficult to obtain further history. Per EMS, they also found a bottle of ativan which was filled two days ago with 21 pills missing. When asked what happened to them, the patient states \"a everardo took them.\"  No known history of trauma.    HPI limited secondary to patients intoxicated state.    REVIEW OF SYSTEMS  Pertinent positive include Alcohol intoxication.  C.    ROS limited secondary to patients intoxicated state      PAST MEDICAL HISTORY   has a past medical history of Alcoholism (HCC); Anxiety; Arthritis; ASTHMA; Cancer (HCC) (1981); Chronic low back pain; Congestive heart failure (HCC); Depression; EtOH dependence (HCC); Fall; GERD (gastroesophageal reflux disease); HTN; Hypertension; Indigestion; Muscle disorder; OSTEOPOROSIS; Other specified symptom associated with female genital organs; Psychiatric disorder; PTSD (post-traumatic stress disorder); Renal disorder; Seizure (HCC); Ulcer; and Vitamin D deficiency.    SOCIAL HISTORY  Social History     Social History Main Topics   • Smoking status: Current Every Day Smoker     Packs/day: 0.50     Years: 20.00     Types: Cigarettes   • Smokeless tobacco: Never Used      Comment: 1/2 pack per day   • Alcohol use Yes      Comment: \"lots of vodka\" currently intoxicated   • Drug use: No   • Sexual activity: No       SURGICAL HISTORY   has a past surgical history that includes hernia repair (1977); cysto " stent placemnt pre surg (10/7/2010); cystoscopy stent placement (11/9/2010); ureteroscopy (11/9/2010); lasertripsy (11/9/2010); and stent removal (11/9/2010).    CURRENT MEDICATIONS  No current facility-administered medications on file prior to encounter.      Current Outpatient Prescriptions on File Prior to Encounter   Medication Sig Dispense Refill   • acetaminophen (TYLENOL) 325 MG Tab Take 2 Tabs by mouth every 6 hours as needed (Mild Pain; (Pain scale 1-3); Temp greater than 100.5 F). 30 Tab 0   • benzocaine (ANBESOL) 10 % Gel Spray 1 Application in mouth/throat every 6 hours as needed. 1 Tube 0   • ibuprofen (MOTRIN) 800 MG Tab Take 1 Tab by mouth every 6 hours as needed for Moderate Pain. 30 Tab    • loperamide (IMODIUM) 2 MG Cap Take 1 Cap by mouth 4 times a day as needed for Diarrhea (allow 2-3 BMs). 30 Cap    • fluoxetine (PROZAC) 40 MG capsule Take 40 mg by mouth every day.     • lisinopril (PRINIVIL) 10 MG Tab Take 10 mg by mouth every day.     • doxepin (SINEQUAN) 50 MG Cap Take 50 mg by mouth every bedtime.     • potassium chloride SA (KDUR) 20 MEQ Tab CR Take 20 mEq by mouth every day.     • lorazepam (ATIVAN) 1 MG Tab Take 1 mg by mouth 3 times a day.     • spironolactone (ALDACTONE) 25 MG Tab Take 25 mg by mouth every day. Unknown dose         ALLERGIES  Allergies   Allergen Reactions   • Sulfa Drugs Vomiting   • Toradol Vomiting   • Neurontin [Gabapentin]      Bowel incontinence         PHYSICAL EXAM   VITAL SIGNS: /66   Pulse 86   Temp 36.5 °C (97.7 °F)   Resp 14   Wt 81.6 kg (180 lb)   LMP 11/02/2015   SpO2 96%   BMI 29.05 kg/m²    Constitutional: Somnolent, arousable to sternal rub, disheveled  HENT: Superficial abrasion to forehead, Normocephalic, Bilateral external ears normal. Nose normal.   Eyes: Pupils are equal and reactive. Conjunctiva normal.   Neck: Supple, full range of motion  Heart: Regular rate and rhythm.  No murmurs.    Lungs: No respiratory distress, normal work of  breathing. Lungs clear to auscultation bilaterally.  Abdomen Soft, no distention.  No tenderness to palpation.  Musculoskeletal: Atraumatic. No obvious deformities noted.  No lower extremity edema.  Skin: Superficial abrasion to forehead, Warm, Dry.  No erythema, No rash.   Neurologic: Somnolent but arousable to sternal rub. Moving all extremities spontaneously without focal deficits.      DIAGNOSTIC STUDIES    LABS  Personally reviewed by me  Labs Reviewed   BASIC METABOLIC PANEL - Abnormal; Notable for the following:        Result Value    Potassium 2.9 (*)     Anion Gap 12.0 (*)     All other components within normal limits   SALICYLATE - Abnormal; Notable for the following:     Salicylates, Quant. 0 (*)     All other components within normal limits   DIAGNOSTIC ALCOHOL - Abnormal; Notable for the following:     Diagnostic Alcohol 0.35 (*)     All other components within normal limits   ACETAMINOPHEN   ESTIMATED GFR       ED COURSE  Vitals:    08/11/18 0001 08/11/18 0004 08/11/18 0723 08/11/18 0730   BP:  108/66     Pulse:  86  90   Resp:  14     Temp:  36.5 °C (97.7 °F)     SpO2:  96% 95% 96%   Weight: 81.6 kg (180 lb)            Medications administered:  Medications   er detox iv 1000 mL (D5LR + magnesium 1 g + thiamine 100 mg + folic acid 1 mg) infusion (0 mL Intravenous Stopped 8/11/18 0233)   potassium chloride SA (Kdur) tablet 40 mEq (40 mEq Oral Given 8/11/18 0512)   potassium chloride (KCL) ivpb 10 mEq (0 mEq Intravenous Stopped 8/11/18 0255)   acetaminophen (TYLENOL) tablet 975 mg (975 mg Oral Given 8/11/18 0525)   omeprazole (PRILOSEC) capsule 20 mg (20 mg Oral Given 8/11/18 0525)         Old records personally reviewed:  Patient seen multiple times through July for alcohol intoxication    12:21 AM Patient seen and examined at bedside. The patient presents with alcohol intoxication. Ordered for Acetaminophen, Diagnostic alcohol, Estimated GFR, BMP, Salicylate to evaluate. Patient will be treated with  KCL 10 mEq, Kdur 40 mEq, ER Detox IV 1 L for her symptoms.     MEDICAL DECISION MAKING  Patient with history of alcohol abuse who is brought in by EMS for alcohol intoxication.  She is afebrile with normal vitals on arrival.  She is significantly intoxicated and somnolent however arousable to sternal rub.  There are no focal neurologic deficits on exam concerning for CVA or intracranial hemorrhage.  Labs are reassuring other than low potassium which was repleted in the department.  There is no evidence of ingestion.  Alcohol level is significantly elevated.  Plan for monitoring in the department for sober reevaluation    4:54 AM Patient was reevaluated at bedside who was more alert at this time. She denies and suicidal ideation at this time. She currently endorses a headache at this time and requests omeprazole. She endorses hitting her head earlier yesterday, however no other injuries sustained and is feeling fine at this time.. Plan for p.o. challenge and road test followed by discharge once appropriate.        The patient will return for new or worsening symptoms and is stable at the time of discharge.    DISPOSITION:  Patient will be discharged home in stable condition.    FOLLOW UP:  [facility], Emergency Dept  [address] [address] [phone]    If symptoms worsen      OUTPATIENT MEDICATIONS:  New Prescriptions    No medications on file       IMPRESSION  (F10.920) Alcoholic intoxication without complication (HCC)  (E87.6) Hypokalemia    Results, diagnoses, and treatment options were discussed with the patient and/or family. Patient verbalized understanding of plan of care.     Jhonathan GAN (Cayetano), am scribing for, and in the presence of, Aaliyah Connolly M.D..    Electronically signed by: Jhonathan Brooke (Cayetano), 8/11/2018    Aaliyah GAN M.D. personally performed the services described in this documentation, as scribed by Jhonathan Brooke in my presence,  and it is both accurate and complete.    The note accurately reflects work and decisions made by me.  Aaliyah Connolly  8/11/2018  8:08 AM

## 2018-08-11 NOTE — ED NOTES
Unable to give PO potassium, pt is too drowsy at this time, unable to swallow pills. ERP notified.

## 2018-08-12 ENCOUNTER — HOSPITAL ENCOUNTER (EMERGENCY)
Facility: MEDICAL CENTER | Age: 58
End: 2018-08-12
Attending: EMERGENCY MEDICINE
Payer: MEDICAID

## 2018-08-12 ENCOUNTER — HOSPITAL ENCOUNTER (EMERGENCY)
Dept: HOSPITAL 8 - ED | Age: 58
Discharge: HOME | End: 2018-08-12
Payer: MEDICAID

## 2018-08-12 ENCOUNTER — HOSPITAL ENCOUNTER (EMERGENCY)
Facility: MEDICAL CENTER | Age: 58
End: 2018-08-12
Payer: MEDICAID

## 2018-08-12 VITALS
HEART RATE: 84 BPM | RESPIRATION RATE: 16 BRPM | TEMPERATURE: 97.3 F | HEIGHT: 67 IN | WEIGHT: 210 LBS | BODY MASS INDEX: 32.96 KG/M2 | SYSTOLIC BLOOD PRESSURE: 120 MMHG | OXYGEN SATURATION: 98 % | DIASTOLIC BLOOD PRESSURE: 68 MMHG

## 2018-08-12 VITALS — HEIGHT: 71 IN | WEIGHT: 180.38 LBS | BODY MASS INDEX: 25.25 KG/M2

## 2018-08-12 VITALS — DIASTOLIC BLOOD PRESSURE: 67 MMHG | SYSTOLIC BLOOD PRESSURE: 118 MMHG

## 2018-08-12 DIAGNOSIS — F10.220: Primary | ICD-10-CM

## 2018-08-12 DIAGNOSIS — F10.920 ALCOHOLIC INTOXICATION WITHOUT COMPLICATION (HCC): ICD-10-CM

## 2018-08-12 DIAGNOSIS — Z91.14: ICD-10-CM

## 2018-08-12 DIAGNOSIS — Z72.89: ICD-10-CM

## 2018-08-12 DIAGNOSIS — Z60.9: ICD-10-CM

## 2018-08-12 LAB
ALBUMIN SERPL-MCNC: 3.2 G/DL (ref 3.4–5)
ALP SERPL-CCNC: 136 U/L (ref 45–117)
ALT SERPL-CCNC: 29 U/L (ref 12–78)
ANION GAP SERPL CALC-SCNC: 7 MMOL/L (ref 5–15)
ANION GAP SERPL CALC-SCNC: 9 MMOL/L (ref 0–11.9)
APAP SERPL-MCNC: <10 UG/ML (ref 10–30)
BASOPHILS # BLD AUTO: 2.2 % (ref 0–1.8)
BASOPHILS # BLD: 0.15 K/UL (ref 0–0.12)
BILIRUB SERPL-MCNC: < 0.1 MG/DL (ref 0.2–1)
BUN SERPL-MCNC: 13 MG/DL (ref 8–22)
CALCIUM SERPL-MCNC: 7.9 MG/DL (ref 8.5–10.1)
CALCIUM SERPL-MCNC: 8.8 MG/DL (ref 8.5–10.5)
CHLORIDE SERPL-SCNC: 107 MMOL/L (ref 96–112)
CHLORIDE SERPL-SCNC: 112 MMOL/L (ref 98–107)
CO2 SERPL-SCNC: 26 MMOL/L (ref 20–33)
CREAT SERPL-MCNC: 0.79 MG/DL (ref 0.5–1.4)
CREAT SERPL-MCNC: 0.86 MG/DL (ref 0.55–1.02)
EOSINOPHIL # BLD AUTO: 0.07 K/UL (ref 0–0.51)
EOSINOPHIL NFR BLD: 1 % (ref 0–6.9)
ERYTHROCYTE [DISTWIDTH] IN BLOOD BY AUTOMATED COUNT: 61.1 FL (ref 35.9–50)
ETHANOL BLD-MCNC: 0.35 G/DL
GLUCOSE SERPL-MCNC: 90 MG/DL (ref 65–99)
HCT VFR BLD AUTO: 37.2 % (ref 37–47)
HGB BLD-MCNC: 11.7 G/DL (ref 12–16)
IMM GRANULOCYTES # BLD AUTO: 0.02 K/UL (ref 0–0.11)
IMM GRANULOCYTES NFR BLD AUTO: 0.3 % (ref 0–0.9)
LYMPHOCYTES # BLD AUTO: 1.92 K/UL (ref 1–4.8)
LYMPHOCYTES NFR BLD: 28 % (ref 22–41)
MCH RBC QN AUTO: 30.5 PG (ref 27–33)
MCHC RBC AUTO-ENTMCNC: 31.5 G/DL (ref 33.6–35)
MCV RBC AUTO: 96.9 FL (ref 81.4–97.8)
MONOCYTES # BLD AUTO: 0.51 K/UL (ref 0–0.85)
MONOCYTES NFR BLD AUTO: 7.4 % (ref 0–13.4)
NEUTROPHILS # BLD AUTO: 4.19 K/UL (ref 2–7.15)
NEUTROPHILS NFR BLD: 61.1 % (ref 44–72)
NRBC # BLD AUTO: 0 K/UL
NRBC BLD-RTO: 0 /100 WBC
PLATELET # BLD AUTO: 293 K/UL (ref 164–446)
PMV BLD AUTO: 9.9 FL (ref 9–12.9)
POTASSIUM SERPL-SCNC: 3.3 MMOL/L (ref 3.6–5.5)
PROT SERPL-MCNC: 7.2 G/DL (ref 6.4–8.2)
RBC # BLD AUTO: 3.84 M/UL (ref 4.2–5.4)
SALICYLATES SERPL-MCNC: 0 MG/DL (ref 15–25)
SODIUM SERPL-SCNC: 142 MMOL/L (ref 135–145)
WBC # BLD AUTO: 6.9 K/UL (ref 4.8–10.8)

## 2018-08-12 PROCEDURE — 99284 EMERGENCY DEPT VISIT MOD MDM: CPT

## 2018-08-12 PROCEDURE — 85025 COMPLETE CBC W/AUTO DIFF WBC: CPT

## 2018-08-12 PROCEDURE — 80048 BASIC METABOLIC PNL TOTAL CA: CPT

## 2018-08-12 PROCEDURE — 80307 DRUG TEST PRSMV CHEM ANLYZR: CPT

## 2018-08-12 PROCEDURE — 80053 COMPREHEN METABOLIC PANEL: CPT

## 2018-08-12 PROCEDURE — 36415 COLL VENOUS BLD VENIPUNCTURE: CPT

## 2018-08-12 SDOH — SOCIAL STABILITY - SOCIAL INSECURITY: PROBLEM RELATED TO SOCIAL ENVIRONMENT, UNSPECIFIED: Z60.9

## 2018-08-12 ASSESSMENT — LIFESTYLE VARIABLES: SUBSTANCE_ABUSE: 1

## 2018-08-12 NOTE — DISCHARGE INSTRUCTIONS
Alcohol Problems  Most adults who drink alcohol drink in moderation (not a lot) are at low risk for developing problems related to their drinking. However, all drinkers, including low-risk drinkers, should know about the health risks connected with drinking alcohol.  RECOMMENDATIONS FOR LOW-RISK DRINKING   Drink in moderation. Moderate drinking is defined as follows:   · Men - no more than 2 drinks per day.  · Nonpregnant women - no more than 1 drink per day.  · Over age 65 - no more than 1 drink per day.  A standard drink is 12 grams of pure alcohol, which is equal to a 12 ounce bottle of beer or wine cooler, a 5 ounce glass of wine, or 1.5 ounces of distilled spirits (such as whiskey, gama, vodka, or rum).   ABSTAIN FROM (DO NOT DRINK) ALCOHOL:  · When pregnant or considering pregnancy.  · When taking a medication that interacts with alcohol.  · If you are alcohol dependent.  · A medical condition that prohibits drinking alcohol (such as ulcer, liver disease, or heart disease).  DISCUSS WITH YOUR CAREGIVER:  · If you are at risk for coronary heart disease, discuss the potential benefits and risks of alcohol use: Light to moderate drinking is associated with lower rates of coronary heart disease in certain populations (for example, men over age 45 and postmenopausal women). Infrequent or nondrinkers are advised not to begin light to moderate drinking to reduce the risk of coronary heart disease so as to avoid creating an alcohol-related problem. Similar protective effects can likely be gained through proper diet and exercise.  · Women and the elderly have smaller amounts of body water than men. As a result women and the elderly achieve a higher blood alcohol concentration after drinking the same amount of alcohol.  · Exposing a fetus to alcohol can cause a broad range of birth defects referred to as Fetal Alcohol Syndrome (FAS) or Alcohol-Related Birth Defects (ARBD). Although FAS/ARBD is connected with excessive  alcohol consumption during pregnancy, studies also have reported neurobehavioral problems in infants born to mothers reporting drinking an average of 1 drink per day during pregnancy.  · Heavier drinking (the consumption of more than 4 drinks per occasion by men and more than 3 drinks per occasion by women) impairs learning (cognitive) and psychomotor functions and increases the risk of alcohol-related problems, including accidents and injuries.  CAGE QUESTIONS:   · Have you ever felt that you should Cut down on your drinking?  · Have people Annoyed you by criticizing your drinking?  · Have you ever felt bad or Guilty about your drinking?  · Have you ever had a drink first thing in the morning to steady your nerves or get rid of a hangover (Eye opener)?  If you answered positively to any of these questions: You may be at risk for alcohol-related problems if alcohol consumption is:   · Men: Greater than 14 drinks per week or more than 4 drinks per occasion.  · Women: Greater than 7 drinks per week or more than 3 drinks per occasion.  Do you or your family have a medical history of alcohol-related problems, such as:  · Blackouts.  · Sexual dysfunction.  · Depression.  · Trauma.  · Liver dysfunction.  · Sleep disorders.  · Hypertension.  · Chronic abdominal pain.  · Has your drinking ever caused you problems, such as problems with your family, problems with your work (or school) performance, or accidents/injuries?  · Do you have a compulsion to drink or a preoccupation with drinking?  · Do you have poor control or are you unable to stop drinking once you have started?  · Do you have to drink to avoid withdrawal symptoms?  · Do you have problems with withdrawal such as tremors, nausea, sweats, or mood disturbances?  · Does it take more alcohol than in the past to get you high?  · Do you feel a strong urge to drink?  · Do you change your plans so that you can have a drink?  · Do you ever drink in the morning to relieve  the shakes or a hangover?  If you have answered a number of the previous questions positively, it may be time for you to talk to your caregivers, family, and friends and see if they think you have a problem. Alcoholism is a chemical dependency that keeps getting worse and will eventually destroy your health and relationships. Many alcoholics end up dead, impoverished, or in custodial. This is often the end result of all chemical dependency.  · Do not be discouraged if you are not ready to take action immediately.  · Decisions to change behavior often involve up and down desires to change and feeling like you cannot decide.  · Try to think more seriously about your drinking behavior.  · Think of the reasons to quit.  WHERE TO GO FOR ADDITIONAL INFORMATION   · The National [place] on Alcohol Abuse and Alcoholism (NIAAA)  www.niaaa.nih.gov  · National [place] on Alcoholism and Drug Dependence (NCADD)  www.ncadd.org  · American Society of Addiction Medicine (ASAM)  www.asam.org   Document Released: 12/18/2006 Document Revised: 03/11/2013 Document Reviewed: 08/05/2009  [employer]® Patient Information ©2014 [employer].

## 2018-08-12 NOTE — ED TRIAGE NOTES
"Patient came in via EMS for detox, patient reportedly drank \"a lot\" of vodka, but is unsure of amount.   "

## 2018-08-12 NOTE — ED TRIAGE NOTES
"Pt bib ems c/o alcohol intoxication unable to ambulate [place].  Pt apparently also took 2 ativan today as well. Pt was seen here yesterday for same. Pt A&O. Nad. 71% on ra placed 6L nc and now 96%. No distress  "

## 2018-08-12 NOTE — ED NOTES
Recommended  Patient not drink alcohol. Provided discharge instructions. No RX.  A/Ox4 at time of discharge.  Ambulated out of ED with steady gait, telling jokes.

## 2018-08-12 NOTE — ED NOTES
Patient given lunch box and water. Patient continues to unhook self from monitor, informed her that she needed to remain on monitor.

## 2018-08-12 NOTE — ED PROVIDER NOTES
"ED Provider Note    Scribed for Catherine Chatman M.D. by Eddy Shearer. 8/11/2018, 7:32 PM.    Primary care provider: Pcp Pt States None  Means of arrival: EMS  History obtained from: Nurse Note  History limited by: Patient's level of intoxication.     CHIEF COMPLAINT  Chief Complaint   Patient presents with   • Alcohol Intoxication       HPI  Ashleigh Marquis is a 57 y.o. female who presents to the Emergency Department via EMS for alcohol intoxication. [place] and unable to ambulate on her own.     History limited secondary to the patient's level of intoxication.     REVIEW OF SYSTEMS  Pertinent positives include alcohol intoxication. E.    Review of systems limited secondary to the patient's level of intoxication.     PAST MEDICAL HISTORY   has a past medical history of Alcoholism ([facility]); Anxiety; Arthritis; ASTHMA; Cancer (HCC) (1981); Chronic low back pain; Congestive heart failure ([facility]); Depression; EtOH dependence ([facility]); Fall; GERD (gastroesophageal reflux disease); HTN; Hypertension; Indigestion; Muscle disorder; OSTEOPOROSIS; Other specified symptom associated with female genital organs; Psychiatric disorder; PTSD (post-traumatic stress disorder); Renal disorder; Seizure ([facility]); Ulcer; and Vitamin D deficiency.    SURGICAL HISTORY   has a past surgical history that includes hernia repair (1977); cysto stent placemnt pre surg (10/7/2010); cystoscopy stent placement (11/9/2010); ureteroscopy (11/9/2010); lasertripsy (11/9/2010); and stent removal (11/9/2010).    SOCIAL HISTORY  Social History   Substance Use Topics   • Smoking status: Current Every Day Smoker     Packs/day: 0.50     Years: 20.00     Types: Cigarettes   • Smokeless tobacco: Never Used      Comment: 1/2 pack per day   • Alcohol use Yes      Comment: \"lots of vodka\" currently intoxicated      History   Drug Use No       FAMILY HISTORY  Family History   Problem Relation Age of Onset   • Heart Disease " "Father    • Hypertension Father    • Diabetes Father    • Cancer Paternal Grandmother    • Depression Other    • Lung Disease Neg Hx    • Stroke Neg Hx        CURRENT MEDICATIONS    Current Outpatient Prescriptions on File Prior to Encounter   Medication Sig Dispense Refill   • acetaminophen (TYLENOL) 325 MG Tab Take 2 Tabs by mouth every 6 hours as needed (Mild Pain; (Pain scale 1-3); Temp greater than 100.5 F). 30 Tab 0   • benzocaine (ANBESOL) 10 % Gel Spray 1 Application in mouth/throat every 6 hours as needed. 1 Tube 0   • ibuprofen (MOTRIN) 800 MG Tab Take 1 Tab by mouth every 6 hours as needed for Moderate Pain. 30 Tab    • loperamide (IMODIUM) 2 MG Cap Take 1 Cap by mouth 4 times a day as needed for Diarrhea (allow 2-3 BMs). 30 Cap    • fluoxetine (PROZAC) 40 MG capsule Take 40 mg by mouth every day.     • lisinopril (PRINIVIL) 10 MG Tab Take 10 mg by mouth every day.     • doxepin (SINEQUAN) 50 MG Cap Take 50 mg by mouth every bedtime.     • potassium chloride SA (KDUR) 20 MEQ Tab CR Take 20 mEq by mouth every day.     • lorazepam (ATIVAN) 1 MG Tab Take 1 mg by mouth 3 times a day.     • spironolactone (ALDACTONE) 25 MG Tab Take 25 mg by mouth every day. Unknown dose        ALLERGIES  Allergies   Allergen Reactions   • Sulfa Drugs Vomiting   • Toradol Vomiting   • Neurontin [Gabapentin]      Bowel incontinence         PHYSICAL EXAM  VITAL SIGNS: /85   Pulse 98   Temp 36.7 °C (98 °F)   Resp 15   Ht 1.676 m (5' 6\")   Wt 95.3 kg (210 lb)   LMP 11/02/2015   SpO2 96%   BMI 33.89 kg/m²   Constitutional: Intoxicated. Well appearing  HENT: Normocephalic, Atraumatic, Bilateral external ears normal. Nose normal.   Eyes:  Conjunctiva normal, non-icteric.   Lungs: Non-labored respirations  Skin: Warm, Dry, No erythema, No rash.   Neurologic: Alert, Grossly non-focal.   Psychiatric: Appears intoxicated.     COURSE & MEDICAL DECISION MAKING  Pertinent Labs & Imaging studies reviewed. (See chart for " details)    7:32 PM - Patient seen and examined at bedside. History limited as the patient is heavily intoxicated. Will re-evaluate once she appears more sober.    This is a 57-year-old female well-known to myself in this department for created presentations for alcohol intoxication.  She is presenting as the same.  She was able to ambulate but unsteadily.  She will be allowed to sober and when she is clinically sober and able to ambulate she will be discharged.      The patient will return for new or worsening symptoms and is stable at the time of discharge. Patient was given return precautions. Patient and/or family member verbalizes understanding and will comply.    DISPOSITION:  Patient will be discharged home in stable condition.    FOLLOW UP:  [facility], Emergency Dept  [address]  [place] [address] [phone]    Return for worsening symptoms or other concerns.      OUTPATIENT MEDICATIONS:  New Prescriptions    No medications on file        FINAL IMPRESSION  1. Alcoholic intoxication without complication (HCC)           This dictation has been created using voice recognition software and/or scribes. The accuracy of the dictation is limited by the abilities of the software and the expertise of the scribes. I expect there may be some errors of grammar and possibly content. I made every attempt to manually correct the errors within my dictation. However, errors related to voice recognition software and/or scribes may still exist and should be interpreted within the appropriate context.     IEddy (Scribe), am scribing for, and in the presence of, Catherine Chatman M.D..    Electronically signed by: Eddy Shearer ([place]), 8/11/2018    Catherine GAN M.D. personally performed the services described in this documentation, as scribed by Eddy Shearer in my presence, and it is both accurate and complete.    The note accurately reflects work and decisions made by me.   Catherine Chatman  8/11/2018  10:38 PM

## 2018-08-12 NOTE — ED PROVIDER NOTES
"ED Provider Note    Scribed for Scott Cho M.D. by Nelson Cooney. 8/12/2018, 9:55 AM.    Primary care provider: Pcp Pt States None  Means of arrival: EMS  History obtained from: Patient   History limited by: Altered mental status.     CHIEF COMPLAINT  Chief Complaint   Patient presents with   • Detox       HPI  Ashleigh Marquis is a 57 y.o. female (with a known history of alcohol abuse) who presents to the Emergency Department with altered mental status secondary to alcohol intoxication. EMS reports that the patient told them she \"drank a lot of vodka\" but otherwise is non verbal and fails to elaborate. Patient is alert to pain only to pain and makes words but is slurred. History is otherwise limited secondary to altered mental status.    REVIEW OF SYSTEMS  Review of Systems   Unable to perform ROS: Mental status change   Psychiatric/Behavioral: Positive for substance abuse.     PAST MEDICAL HISTORY   has a past medical history of Alcoholism ([facility]); Anxiety; Arthritis; ASTHMA; Cancer ([facility]) (1981); Chronic low back pain; Congestive heart failure ([facility]); Depression; EtOH dependence ([facility]); Fall; GERD (gastroesophageal reflux disease); HTN; Hypertension; Indigestion; Muscle disorder; OSTEOPOROSIS; Other specified symptom associated with female genital organs; Psychiatric disorder; PTSD (post-traumatic stress disorder); Renal disorder; Seizure ([facility]); Ulcer; and Vitamin D deficiency.    SURGICAL HISTORY   has a past surgical history that includes hernia repair (1977); cysto stent placemnt pre surg (10/7/2010); cystoscopy stent placement (11/9/2010); ureteroscopy (11/9/2010); lasertripsy (11/9/2010); and stent removal (11/9/2010).    SOCIAL HISTORY  Social History   Substance Use Topics   • Smoking status: Current Every Day Smoker     Packs/day: 0.50     Years: 20.00     Types: Cigarettes   • Smokeless tobacco: Never Used      Comment: 1/2 pack per day   • Alcohol use Yes      Comment: \"lots of vodka\" currently " "intoxicated      History   Drug Use No       FAMILY HISTORY  Family History   Problem Relation Age of Onset   • Heart Disease Father    • Hypertension Father    • Diabetes Father    • Cancer Paternal Grandmother    • Depression Other    • Lung Disease Neg Hx    • Stroke Neg Hx        CURRENT MEDICATIONS  No current facility-administered medications on file prior to encounter.      Current Outpatient Prescriptions on File Prior to Encounter   Medication Sig Dispense Refill   • acetaminophen (TYLENOL) 325 MG Tab Take 2 Tabs by mouth every 6 hours as needed (Mild Pain; (Pain scale 1-3); Temp greater than 100.5 F). 30 Tab 0   • benzocaine (ANBESOL) 10 % Gel Spray 1 Application in mouth/throat every 6 hours as needed. 1 Tube 0   • ibuprofen (MOTRIN) 800 MG Tab Take 1 Tab by mouth every 6 hours as needed for Moderate Pain. 30 Tab    • loperamide (IMODIUM) 2 MG Cap Take 1 Cap by mouth 4 times a day as needed for Diarrhea (allow 2-3 BMs). 30 Cap    • fluoxetine (PROZAC) 40 MG capsule Take 40 mg by mouth every day.     • lisinopril (PRINIVIL) 10 MG Tab Take 10 mg by mouth every day.     • doxepin (SINEQUAN) 50 MG Cap Take 50 mg by mouth every bedtime.     • potassium chloride SA (KDUR) 20 MEQ Tab CR Take 20 mEq by mouth every day.     • lorazepam (ATIVAN) 1 MG Tab Take 1 mg by mouth 3 times a day.     • spironolactone (ALDACTONE) 25 MG Tab Take 25 mg by mouth every day. Unknown dose       ALLERGIES  Allergies   Allergen Reactions   • Sulfa Drugs Vomiting   • Toradol Vomiting   • Neurontin [Gabapentin]      Bowel incontinence         PHYSICAL EXAM  VITAL SIGNS: /68   Pulse 90   Temp 36.3 °C (97.3 °F)   Resp 16   Ht 1.702 m (5' 7\")   Wt 95.3 kg (210 lb)   LMP 11/02/2015   SpO2 91%   BMI 32.89 kg/m²     Constitutional:  Mild distress, Unkept and appears dirty with bandages on fingers.  HENT:  Moist mucous membranes  Eyes:  No conjunctivitis or icterus  Neck:  trachea is midline, no palpable thyroid  Lymphatic:  No " cervical lymphadenopathy  Cardiovascular:  Regular rate and rhythm, no murmurs  Thorax & Lungs:  Normal breath sounds, no rhonchi  Abdomen:  Soft, Non-tender  Skin:.  no rash  Back:  Non-tender, no CVA tenderness  Extremities:   no edema  Vascular:  symmetric radial pulse  Neurologic: Responds only to verbal stimulus. Slurred words.     LABS  Labs Reviewed   CBC WITH DIFFERENTIAL - Abnormal; Notable for the following:        Result Value    RBC 3.84 (*)     Hemoglobin 11.7 (*)     MCHC 31.5 (*)     RDW 61.1 (*)     Basophils 2.20 (*)     Baso (Absolute) 0.15 (*)     All other components within normal limits   BASIC METABOLIC PANEL - Abnormal; Notable for the following:     Potassium 3.3 (*)     All other components within normal limits   DIAGNOSTIC ALCOHOL - Abnormal; Notable for the following:     Diagnostic Alcohol 0.35 (*)     All other components within normal limits   SALICYLATE - Abnormal; Notable for the following:     Salicylates, Quant. 0 (*)     All other components within normal limits   ACETAMINOPHEN   ESTIMATED GFR     All labs reviewed by me.    COURSE & MEDICAL DECISION MAKING  Pertinent Labs & Imaging studies reviewed. (See chart for details)    9:55 AM - Patient seen and examined at bedside. Ordered CBC, BMP, diagnostic alcohol, acetaminophen, and salicylate to evaluate her symptoms. The differential diagnoses include but are not limited to: alcohol intoxication/abuse, overdose.      12:26 AM - Patient is awake and given lunch box and water. Per nursing she continuously tries to unhook herself from monitor.     1:04 PM -  Patient left AMA. IV taken out by patient.         The patient will return for new or worsening symptoms and is stable at the time of discharge.    DISPOSITION:  Patient will be discharged home in stable condition.    FOLLOW UP:  No follow-up provider specified.    OUTPATIENT MEDICATIONS:  Discharge Medication List as of 8/12/2018  1:05 PM          FINAL IMPRESSION  1. Alcoholic  intoxication without complication (HCC)          INelson (Scribe), am scribing for, and in the presence of, Scott Cho M.D..    Electronically signed by: Nelson Cooney (Scribe), 8/12/2018    IScott M.D. personally performed the services described in this documentation, as scribed by Nelson Cooney in my presence, and it is both accurate and complete.    The note accurately reflects work and decisions made by me.  Scott Cho  8/12/2018  2:22 PM

## 2018-08-14 ENCOUNTER — HOSPITAL ENCOUNTER (EMERGENCY)
Facility: MEDICAL CENTER | Age: 58
End: 2018-08-14
Payer: MEDICAID

## 2018-08-14 ENCOUNTER — HOSPITAL ENCOUNTER (EMERGENCY)
Facility: MEDICAL CENTER | Age: 58
End: 2018-08-15
Attending: EMERGENCY MEDICINE
Payer: MEDICAID

## 2018-08-14 VITALS
OXYGEN SATURATION: 95 % | TEMPERATURE: 98.4 F | WEIGHT: 210 LBS | BODY MASS INDEX: 32.89 KG/M2 | SYSTOLIC BLOOD PRESSURE: 149 MMHG | HEART RATE: 100 BPM | RESPIRATION RATE: 18 BRPM | DIASTOLIC BLOOD PRESSURE: 89 MMHG

## 2018-08-14 DIAGNOSIS — F10.920 ALCOHOLIC INTOXICATION WITHOUT COMPLICATION (HCC): ICD-10-CM

## 2018-08-14 PROCEDURE — 302449 STATCHG TRIAGE ONLY (STATISTIC)

## 2018-08-14 PROCEDURE — 99284 EMERGENCY DEPT VISIT MOD MDM: CPT

## 2018-08-15 VITALS
BODY MASS INDEX: 29.87 KG/M2 | WEIGHT: 190.7 LBS | DIASTOLIC BLOOD PRESSURE: 76 MMHG | RESPIRATION RATE: 16 BRPM | HEART RATE: 86 BPM | OXYGEN SATURATION: 96 % | SYSTOLIC BLOOD PRESSURE: 114 MMHG | TEMPERATURE: 97.9 F

## 2018-08-15 ASSESSMENT — PAIN SCALES - GENERAL: PAINLEVEL_OUTOF10: 0

## 2018-08-15 NOTE — DISCHARGE PLANNING
Provided patient with resources.  Faxed patient info to [facility] for follow up services.

## 2018-08-15 NOTE — ED PROVIDER NOTES
"CHIEF COMPLAINT  Chief Complaint   Patient presents with   • Alcohol Intoxication   • Detox       HPI  Ashleigh Marquis is a 57 y.o. female who presents with alcohol intoxication.  Has been here several times for similar presentations.  Earlier today she was found passed out at a bar and was brought in here for evaluation.  She walked out prior to evaluation earlier today.  She states that she is here for detox information as she no longer wants to drink.  Denies suicidal or homicidal ideation.  Denies attempt at harming herself earlier today.  She has slurred speech and odor of alcohol.  History is rather limited secondary to patient's clinical intoxicated status.    REVIEW OF SYSTEMS  See HPI for further details. All other systems are negative.     PAST MEDICAL HISTORY   has a past medical history of Alcoholism ([facility]); Anxiety; Arthritis; ASTHMA; Cancer ([facility]) (1981); Chronic low back pain; Congestive heart failure ([facility]); Depression; EtOH dependence ([facility]); Fall; GERD (gastroesophageal reflux disease); HTN; Hypertension; Indigestion; Muscle disorder; OSTEOPOROSIS; Other specified symptom associated with female genital organs; Psychiatric disorder; PTSD (post-traumatic stress disorder); Renal disorder; Seizure ([facility]); Ulcer; and Vitamin D deficiency.    SOCIAL HISTORY  Social History     Social History Main Topics   • Smoking status: Current Every Day Smoker     Packs/day: 0.50     Years: 20.00     Types: Cigarettes   • Smokeless tobacco: Never Used      Comment: 1/2 pack per day   • Alcohol use Yes      Comment: \"lots of vodka\" currently intoxicated   • Drug use: No   • Sexual activity: No       SURGICAL HISTORY   has a past surgical history that includes hernia repair (1977); cysto stent placemnt pre surg (10/7/2010); cystoscopy stent placement (11/9/2010); ureteroscopy (11/9/2010); lasertripsy (11/9/2010); and stent removal (11/9/2010).    CURRENT MEDICATIONS  Home Medications    **Home medications have not yet " been reviewed for this encounter**         ALLERGIES  Allergies   Allergen Reactions   • Sulfa Drugs Vomiting   • Toradol Vomiting   • Neurontin [Gabapentin]      Bowel incontinence         PHYSICAL EXAM  VITAL SIGNS: /81   Pulse 100   Temp 36.6 °C (97.9 °F)   Resp 18   Wt 86.5 kg (190 lb 11.2 oz)   LMP 11/02/2015   SpO2 95%   BMI 29.87 kg/m²   Pulse ox interpretation: I interpret this pulse ox as normal.  Constitutional: In no apparent distress.  No signs of trauma.  HENT: No signs of trauma, Bilateral external ears normal, Nose normal.   Eyes: Pupils are equal and reactive, Conjunctiva normal, Non-icteric.   Neck: Normal range of motion, No tenderness, Supple, No stridor.   Cardiovascular: Regular rate and rhythm, no murmurs.   Thorax & Lungs: Normal breath sounds, No respiratory distress, No wheezing, No chest tenderness.   Abdomen: Bowel sounds normal, Soft, No tenderness, No masses, No pulsatile masses. No peritoneal signs.  Skin: Warm, Dry, No erythema, No rash.   Back: No bony tenderness, No CVA tenderness.   Extremities: Intact distal pulses, No edema, No tenderness, No cyanosis  Musculoskeletal: Good range of motion in all major joints. No tenderness to palpation or major deformities noted.   Neurologic: Somnolent, odor of alcohol, slurred speech, No focal deficits noted.       DIAGNOSTIC STUDIES / PROCEDURES    COURSE & MEDICAL DECISION MAKING  Pertinent Labs & Imaging studies reviewed. (See chart for details)  57 y.o. female with a history of chronic alcohol abuse presenting with apparent alcohol intoxication.  She made statements that she would like to quit drinking.  She has been here several times in the past few days all for alcohol intoxication.  There are no signs of alcohol withdrawal.  Denies suicidal ideation or suicide attempt.  The patient is rather somnolent likely secondary to alcohol intoxication.  She was observed here for several hours with improvement in her mental status.   Plan is to discharge the patient home with referral to a variety of resources in town to help her quit drinking.  Once she is clinically sober and able to ambulate with a steady gait, the patient will be discharged home.  Will observe closely until then for signs of alcohol withdrawal.    The patient will return for worsening symptoms or failure of improvement and is stable at the time of discharge. The patient verbalizes understanding in their own words.    /81   Pulse 100   Temp 36.6 °C (97.9 °F)   Resp 18   Wt 86.5 kg (190 lb 11.2 oz)   LMP 11/02/2015   SpO2 95%   BMI 29.87 kg/m²     The patient was referred to primary care where they will receive further BP management.      [facility], Emergency Dept  [address] [address] [phone]    As needed, If symptoms worsen    Primary care doctor          FINAL IMPRESSION  1. Alcoholic intoxication without complication (HCC)            Electronically signed by: Edwardo Goldstein, 8/14/2018 9:55 PM

## 2018-08-15 NOTE — DISCHARGE INSTRUCTIONS
Alcohol Problems  Most adults who drink alcohol drink in moderation (not a lot) are at low risk for developing problems related to their drinking. However, all drinkers, including low-risk drinkers, should know about the health risks connected with drinking alcohol.  RECOMMENDATIONS FOR LOW-RISK DRINKING   Drink in moderation. Moderate drinking is defined as follows:   · Men - no more than 2 drinks per day.  · Nonpregnant women - no more than 1 drink per day.  · Over age 65 - no more than 1 drink per day.  A standard drink is 12 grams of pure alcohol, which is equal to a 12 ounce bottle of beer or wine cooler, a 5 ounce glass of wine, or 1.5 ounces of distilled spirits (such as whiskey, gama, vodka, or rum).   ABSTAIN FROM (DO NOT DRINK) ALCOHOL:  · When pregnant or considering pregnancy.  · When taking a medication that interacts with alcohol.  · If you are alcohol dependent.  · A medical condition that prohibits drinking alcohol (such as ulcer, liver disease, or heart disease).  DISCUSS WITH YOUR CAREGIVER:  · If you are at risk for coronary heart disease, discuss the potential benefits and risks of alcohol use: Light to moderate drinking is associated with lower rates of coronary heart disease in certain populations (for example, men over age 45 and postmenopausal women). Infrequent or nondrinkers are advised not to begin light to moderate drinking to reduce the risk of coronary heart disease so as to avoid creating an alcohol-related problem. Similar protective effects can likely be gained through proper diet and exercise.  · Women and the elderly have smaller amounts of body water than men. As a result women and the elderly achieve a higher blood alcohol concentration after drinking the same amount of alcohol.  · Exposing a fetus to alcohol can cause a broad range of birth defects referred to as Fetal Alcohol Syndrome (FAS) or Alcohol-Related Birth Defects (ARBD). Although FAS/ARBD is connected with excessive  alcohol consumption during pregnancy, studies also have reported neurobehavioral problems in infants born to mothers reporting drinking an average of 1 drink per day during pregnancy.  · Heavier drinking (the consumption of more than 4 drinks per occasion by men and more than 3 drinks per occasion by women) impairs learning (cognitive) and psychomotor functions and increases the risk of alcohol-related problems, including accidents and injuries.  CAGE QUESTIONS:   · Have you ever felt that you should Cut down on your drinking?  · Have people Annoyed you by criticizing your drinking?  · Have you ever felt bad or Guilty about your drinking?  · Have you ever had a drink first thing in the morning to steady your nerves or get rid of a hangover (Eye opener)?  If you answered positively to any of these questions: You may be at risk for alcohol-related problems if alcohol consumption is:   · Men: Greater than 14 drinks per week or more than 4 drinks per occasion.  · Women: Greater than 7 drinks per week or more than 3 drinks per occasion.  Do you or your family have a medical history of alcohol-related problems, such as:  · Blackouts.  · Sexual dysfunction.  · Depression.  · Trauma.  · Liver dysfunction.  · Sleep disorders.  · Hypertension.  · Chronic abdominal pain.  · Has your drinking ever caused you problems, such as problems with your family, problems with your work (or school) performance, or accidents/injuries?  · Do you have a compulsion to drink or a preoccupation with drinking?  · Do you have poor control or are you unable to stop drinking once you have started?  · Do you have to drink to avoid withdrawal symptoms?  · Do you have problems with withdrawal such as tremors, nausea, sweats, or mood disturbances?  · Does it take more alcohol than in the past to get you high?  · Do you feel a strong urge to drink?  · Do you change your plans so that you can have a drink?  · Do you ever drink in the morning to relieve  the shakes or a hangover?  If you have answered a number of the previous questions positively, it may be time for you to talk to your caregivers, family, and friends and see if they think you have a problem. Alcoholism is a chemical dependency that keeps getting worse and will eventually destroy your health and relationships. Many alcoholics end up dead, impoverished, or in penitentiary. This is often the end result of all chemical dependency.  · Do not be discouraged if you are not ready to take action immediately.  · Decisions to change behavior often involve up and down desires to change and feeling like you cannot decide.  · Try to think more seriously about your drinking behavior.  · Think of the reasons to quit.  WHERE TO GO FOR ADDITIONAL INFORMATION   · The National [place] on Alcohol Abuse and Alcoholism (NIAAA)  www.niaaa.nih.gov  · National [place] on Alcoholism and Drug Dependence (NCADD)  www.ncadd.org  · American Society of Addiction Medicine (ASAM)  www.asam.org   Document Released: 12/18/2006 Document Revised: 03/11/2013 Document Reviewed: 08/05/2009  [employer]® Patient Information ©2014 [employer], [employer].
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
## 2018-08-15 NOTE — ED TRIAGE NOTES
Chief Complaint   Patient presents with   • Alcohol Intoxication   • Detox     Patient ambulatory to triage. Patient states that she is intoxicated. Says that she drank two pints of vodka today. Patient wants to detox. Says that she is probably dehydrated because she does not drink water. /81   Pulse 100   Temp 36.6 °C (97.9 °F)   Resp 18   Wt 86.5 kg (190 lb 11.2 oz)   LMP 11/02/2015   SpO2 95%   BMI 29.87 kg/m²
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
## 2018-09-05 ENCOUNTER — HOSPITAL ENCOUNTER (EMERGENCY)
Dept: HOSPITAL 8 - ED | Age: 58
LOS: 1 days | Discharge: HOME | End: 2018-09-06
Payer: MEDICAID

## 2018-09-05 VITALS — HEIGHT: 65 IN | WEIGHT: 192.68 LBS | BODY MASS INDEX: 32.1 KG/M2

## 2018-09-05 DIAGNOSIS — K21.9: ICD-10-CM

## 2018-09-05 DIAGNOSIS — I10: ICD-10-CM

## 2018-09-05 DIAGNOSIS — F10.220: Primary | ICD-10-CM

## 2018-09-05 DIAGNOSIS — Z72.9: ICD-10-CM

## 2018-09-05 PROCEDURE — 36415 COLL VENOUS BLD VENIPUNCTURE: CPT

## 2018-09-05 PROCEDURE — 99283 EMERGENCY DEPT VISIT LOW MDM: CPT

## 2018-09-05 PROCEDURE — 80307 DRUG TEST PRSMV CHEM ANLYZR: CPT

## 2018-09-06 ENCOUNTER — HOSPITAL ENCOUNTER (EMERGENCY)
Dept: HOSPITAL 8 - ED | Age: 58
End: 2018-09-06
Payer: MEDICAID

## 2018-09-06 VITALS — WEIGHT: 209.44 LBS | BODY MASS INDEX: 31.74 KG/M2 | HEIGHT: 68 IN

## 2018-09-06 VITALS — SYSTOLIC BLOOD PRESSURE: 144 MMHG | DIASTOLIC BLOOD PRESSURE: 91 MMHG

## 2018-09-06 VITALS — SYSTOLIC BLOOD PRESSURE: 132 MMHG | DIASTOLIC BLOOD PRESSURE: 93 MMHG

## 2018-09-06 DIAGNOSIS — G93.40: ICD-10-CM

## 2018-09-06 DIAGNOSIS — Z86.73: ICD-10-CM

## 2018-09-06 DIAGNOSIS — F10.129: Primary | ICD-10-CM

## 2018-09-06 LAB
ALBUMIN SERPL-MCNC: 3.4 G/DL (ref 3.4–5)
ALP SERPL-CCNC: 162 U/L (ref 45–117)
ALT SERPL-CCNC: 25 U/L (ref 12–78)
ANION GAP SERPL CALC-SCNC: 9 MMOL/L (ref 5–15)
BASOPHILS # BLD AUTO: 0.06 X10^3/UL (ref 0–0.1)
BASOPHILS NFR BLD AUTO: 1 % (ref 0–1)
BILIRUB SERPL-MCNC: 0.2 MG/DL (ref 0.2–1)
CALCIUM SERPL-MCNC: 8 MG/DL (ref 8.5–10.1)
CHLORIDE SERPL-SCNC: 109 MMOL/L (ref 98–107)
CREAT SERPL-MCNC: 0.81 MG/DL (ref 0.55–1.02)
EOSINOPHIL # BLD AUTO: 0.07 X10^3/UL (ref 0–0.4)
EOSINOPHIL NFR BLD AUTO: 1 % (ref 1–7)
ERYTHROCYTE [DISTWIDTH] IN BLOOD BY AUTOMATED COUNT: 19.1 % (ref 9.6–15.2)
INR PPP: 0.96 (ref 0.93–1.1)
LYMPHOCYTES # BLD AUTO: 1.99 X10^3/UL (ref 1–3.4)
LYMPHOCYTES NFR BLD AUTO: 38 % (ref 22–44)
MCH RBC QN AUTO: 30.7 PG (ref 27–34.8)
MCHC RBC AUTO-ENTMCNC: 33.5 G/DL (ref 32.4–35.8)
MCV RBC AUTO: 91.6 FL (ref 80–100)
MD: NO
MONOCYTES # BLD AUTO: 0.55 X10^3/UL (ref 0.2–0.8)
MONOCYTES NFR BLD AUTO: 10 % (ref 2–9)
NEUTROPHILS # BLD AUTO: 2.63 X10^3/UL (ref 1.8–6.8)
NEUTROPHILS NFR BLD AUTO: 50 % (ref 42–75)
PLATELET # BLD AUTO: 304 X10^3/UL (ref 130–400)
PMV BLD AUTO: 7.8 FL (ref 7.4–10.4)
PROT SERPL-MCNC: 7.5 G/DL (ref 6.4–8.2)
PROTHROMBIN TIME: 10 SECONDS (ref 9.6–11.5)
RBC # BLD AUTO: 3.84 X10^6/UL (ref 3.82–5.3)
TROPONIN I SERPL-MCNC: < 0.015 NG/ML (ref 0–0.04)

## 2018-09-06 PROCEDURE — 85025 COMPLETE CBC W/AUTO DIFF WBC: CPT

## 2018-09-06 PROCEDURE — 93005 ELECTROCARDIOGRAM TRACING: CPT

## 2018-09-06 PROCEDURE — 36415 COLL VENOUS BLD VENIPUNCTURE: CPT

## 2018-09-06 PROCEDURE — 83880 ASSAY OF NATRIURETIC PEPTIDE: CPT

## 2018-09-06 PROCEDURE — 84484 ASSAY OF TROPONIN QUANT: CPT

## 2018-09-06 PROCEDURE — 80053 COMPREHEN METABOLIC PANEL: CPT

## 2018-09-06 PROCEDURE — 85610 PROTHROMBIN TIME: CPT

## 2018-09-06 PROCEDURE — 71045 X-RAY EXAM CHEST 1 VIEW: CPT

## 2018-09-06 PROCEDURE — 99285 EMERGENCY DEPT VISIT HI MDM: CPT

## 2018-09-07 ENCOUNTER — HOSPITAL ENCOUNTER (EMERGENCY)
Facility: MEDICAL CENTER | Age: 58
End: 2018-09-07
Attending: EMERGENCY MEDICINE
Payer: MEDICAID

## 2018-09-07 ENCOUNTER — APPOINTMENT (OUTPATIENT)
Dept: RADIOLOGY | Facility: MEDICAL CENTER | Age: 58
End: 2018-09-07
Attending: EMERGENCY MEDICINE
Payer: MEDICAID

## 2018-09-07 VITALS
WEIGHT: 190 LBS | SYSTOLIC BLOOD PRESSURE: 155 MMHG | OXYGEN SATURATION: 94 % | TEMPERATURE: 96.7 F | RESPIRATION RATE: 16 BRPM | DIASTOLIC BLOOD PRESSURE: 98 MMHG | BODY MASS INDEX: 29.76 KG/M2 | HEART RATE: 96 BPM

## 2018-09-07 VITALS
RESPIRATION RATE: 17 BRPM | SYSTOLIC BLOOD PRESSURE: 134 MMHG | HEIGHT: 67 IN | WEIGHT: 190 LBS | OXYGEN SATURATION: 93 % | HEART RATE: 91 BPM | BODY MASS INDEX: 29.82 KG/M2 | DIASTOLIC BLOOD PRESSURE: 76 MMHG | TEMPERATURE: 98.2 F

## 2018-09-07 DIAGNOSIS — F10.920 ALCOHOLIC INTOXICATION WITHOUT COMPLICATION (HCC): ICD-10-CM

## 2018-09-07 DIAGNOSIS — S09.90XA CLOSED HEAD INJURY, INITIAL ENCOUNTER: ICD-10-CM

## 2018-09-07 PROCEDURE — 99284 EMERGENCY DEPT VISIT MOD MDM: CPT

## 2018-09-07 PROCEDURE — 304561 HCHG STAT O2

## 2018-09-07 PROCEDURE — 302449 STATCHG TRIAGE ONLY (STATISTIC)

## 2018-09-07 PROCEDURE — 70450 CT HEAD/BRAIN W/O DYE: CPT

## 2018-09-07 ASSESSMENT — PAIN SCALES - GENERAL: PAINLEVEL_OUTOF10: ASSUMED PAIN PRESENT
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
## 2018-09-07 NOTE — DISCHARGE INSTRUCTIONS
Alcohol Problems  Most adults who drink alcohol drink in moderation (not a lot) are at low risk for developing problems related to their drinking. However, all drinkers, including low-risk drinkers, should know about the health risks connected with drinking alcohol.  RECOMMENDATIONS FOR LOW-RISK DRINKING   Drink in moderation. Moderate drinking is defined as follows:   · Men - no more than 2 drinks per day.  · Nonpregnant women - no more than 1 drink per day.  · Over age 65 - no more than 1 drink per day.  A standard drink is 12 grams of pure alcohol, which is equal to a 12 ounce bottle of beer or wine cooler, a 5 ounce glass of wine, or 1.5 ounces of distilled spirits (such as whiskey, gama, vodka, or rum).   ABSTAIN FROM (DO NOT DRINK) ALCOHOL:  · When pregnant or considering pregnancy.  · When taking a medication that interacts with alcohol.  · If you are alcohol dependent.  · A medical condition that prohibits drinking alcohol (such as ulcer, liver disease, or heart disease).  DISCUSS WITH YOUR CAREGIVER:  · If you are at risk for coronary heart disease, discuss the potential benefits and risks of alcohol use: Light to moderate drinking is associated with lower rates of coronary heart disease in certain populations (for example, men over age 45 and postmenopausal women). Infrequent or nondrinkers are advised not to begin light to moderate drinking to reduce the risk of coronary heart disease so as to avoid creating an alcohol-related problem. Similar protective effects can likely be gained through proper diet and exercise.  · Women and the elderly have smaller amounts of body water than men. As a result women and the elderly achieve a higher blood alcohol concentration after drinking the same amount of alcohol.  · Exposing a fetus to alcohol can cause a broad range of birth defects referred to as Fetal Alcohol Syndrome (FAS) or Alcohol-Related Birth Defects (ARBD). Although FAS/ARBD is connected with excessive  alcohol consumption during pregnancy, studies also have reported neurobehavioral problems in infants born to mothers reporting drinking an average of 1 drink per day during pregnancy.  · Heavier drinking (the consumption of more than 4 drinks per occasion by men and more than 3 drinks per occasion by women) impairs learning (cognitive) and psychomotor functions and increases the risk of alcohol-related problems, including accidents and injuries.  CAGE QUESTIONS:   · Have you ever felt that you should Cut down on your drinking?  · Have people Annoyed you by criticizing your drinking?  · Have you ever felt bad or Guilty about your drinking?  · Have you ever had a drink first thing in the morning to steady your nerves or get rid of a hangover (Eye opener)?  If you answered positively to any of these questions: You may be at risk for alcohol-related problems if alcohol consumption is:   · Men: Greater than 14 drinks per week or more than 4 drinks per occasion.  · Women: Greater than 7 drinks per week or more than 3 drinks per occasion.  Do you or your family have a medical history of alcohol-related problems, such as:  · Blackouts.  · Sexual dysfunction.  · Depression.  · Trauma.  · Liver dysfunction.  · Sleep disorders.  · Hypertension.  · Chronic abdominal pain.  · Has your drinking ever caused you problems, such as problems with your family, problems with your work (or school) performance, or accidents/injuries?  · Do you have a compulsion to drink or a preoccupation with drinking?  · Do you have poor control or are you unable to stop drinking once you have started?  · Do you have to drink to avoid withdrawal symptoms?  · Do you have problems with withdrawal such as tremors, nausea, sweats, or mood disturbances?  · Does it take more alcohol than in the past to get you high?  · Do you feel a strong urge to drink?  · Do you change your plans so that you can have a drink?  · Do you ever drink in the morning to relieve  the shakes or a hangover?  If you have answered a number of the previous questions positively, it may be time for you to talk to your caregivers, family, and friends and see if they think you have a problem. Alcoholism is a chemical dependency that keeps getting worse and will eventually destroy your health and relationships. Many alcoholics end up dead, impoverished, or in custodial. This is often the end result of all chemical dependency.  · Do not be discouraged if you are not ready to take action immediately.  · Decisions to change behavior often involve up and down desires to change and feeling like you cannot decide.  · Try to think more seriously about your drinking behavior.  · Think of the reasons to quit.  WHERE TO GO FOR ADDITIONAL INFORMATION   · The National [place] on Alcohol Abuse and Alcoholism (NIAAA)  www.niaaa.nih.gov  · National [place] on Alcoholism and Drug Dependence (NCADD)  www.ncadd.org  · American Society of Addiction Medicine (ASAM)  www.asam.org   Document Released: 12/18/2006 Document Revised: 03/11/2013 Document Reviewed: 08/05/2009  ExitCare® Patient Information ©2014 [employer].
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
## 2018-09-08 NOTE — ED NOTES
Chief Complaint   Patient presents with   • Alcohol Intoxication     wants to detox   • Knee Pain     x3days     Pt ambulated to [facility] from lobby with above complaints.   Pt d/c from ed earlier. Pt asking me for food. Advised that have to wait for doctor.

## 2018-09-08 NOTE — ED PROVIDER NOTES
ED Provider Note    After signing up for this patient I was asked to attend to a critically ill patient.  I have just now gone to the room and I am told that she has left.      Please note that she has not been seen by a physician.

## 2018-09-08 NOTE — ED NOTES
"ASSIST RN: Pt ambulate in [facility], states \"I am leaving\" pt advise of the risk of leaving and the benefits of staying.   "

## 2018-09-18 ENCOUNTER — HOSPITAL ENCOUNTER (EMERGENCY)
Facility: MEDICAL CENTER | Age: 58
End: 2018-09-18
Attending: EMERGENCY MEDICINE
Payer: MEDICAID

## 2018-09-18 VITALS
SYSTOLIC BLOOD PRESSURE: 132 MMHG | WEIGHT: 180 LBS | HEIGHT: 67 IN | RESPIRATION RATE: 18 BRPM | DIASTOLIC BLOOD PRESSURE: 75 MMHG | TEMPERATURE: 98.9 F | HEART RATE: 79 BPM | BODY MASS INDEX: 28.25 KG/M2

## 2018-09-18 DIAGNOSIS — F10.10 ALCOHOL ABUSE: ICD-10-CM

## 2018-09-18 DIAGNOSIS — F10.920 ACUTE ALCOHOLIC INTOXICATION WITHOUT COMPLICATION (HCC): ICD-10-CM

## 2018-09-18 LAB — POC BREATHALIZER: 0.28 PERCENT (ref 0–0.01)

## 2018-09-18 PROCEDURE — 99284 EMERGENCY DEPT VISIT MOD MDM: CPT

## 2018-09-18 PROCEDURE — 302970 POC BREATHALIZER: Performed by: EMERGENCY MEDICINE

## 2018-09-18 RX ORDER — DIAZEPAM 10 MG/1
10 TABLET ORAL EVERY 4 HOURS PRN
Qty: 20 TAB | Refills: 0 | Status: SHIPPED | OUTPATIENT
Start: 2018-09-18 | End: 2018-09-21

## 2018-09-18 NOTE — ED TRIAGE NOTES
Arrived via [facility] after pt was found at [facility]. Pt concerned about having seizures because she has a history. Pt very obviously intoxicated

## 2018-09-18 NOTE — ED NOTES
"Pt reports nausea requesting medication, Also offered emesis bag pt states \"I don't give a fuck about the bag\"   "

## 2018-09-18 NOTE — ED NOTES
"Pt self D/C'ed IV and when asked why she chose to do that Pt states \"you can't fucking help me, no one's gonna fucking help me\" Pt was cleaned gauze applied to stop bleeding   "

## 2018-09-18 NOTE — ED PROVIDER NOTES
"ED Provider Note    Scribed for Marcello Melton M.D. by Rober Naidu. 9/18/2018  2:38 PM    Primary care provider: Pcp Pt States None  Means of arrival: EMS  History obtained from: Patient  History limited by: Alcohol intoxication    CHIEF COMPLAINT  Chief Complaint   Patient presents with   • Alcohol Intoxication   • Detox       HPI  Ashleigh Marquis is a 57 y.o. female who presents to the Emergency Department for evaluation of alcohol intoxication. Patient states she drinks alcohol everyday. She called EMS today because she wanted to go to detox. On review of systems, patient reports having a headache. She denies any vomiting, abdominal pain, or chest pain. She mentions having a fall or similar event a few weeks ago. She notes history of seizures and alcohol withdrawal seizures. She denies currently being on any seizure medications.    History is limited secondary to alcohol intoxication.      REVIEW OF SYSTEMS  Pertinent positives include: alcohol intoxication, headache.  Pertinent negatives include: vomiting, abdominal pain, chest pain.    ROS is limited secondary to alcohol intoxication.      PAST MEDICAL HISTORY  Past Medical History:   Diagnosis Date   • Alcoholism (HCC)    • Anxiety    • Arthritis    • ASTHMA    • Cancer (HCC) 1981    cervical   • Chronic low back pain    • Congestive heart failure (HCC)    • Depression    • EtOH dependence (HCC)    • Fall     alcohol related   • GERD (gastroesophageal reflux disease)    • HTN    • Hypertension    • Indigestion     GERD   • Muscle disorder    • OSTEOPOROSIS    • Other specified symptom associated with female genital organs     \"I have fibroids bad\"\"   • Psychiatric disorder    • PTSD (post-traumatic stress disorder)    • Renal disorder     Hx of stones   • Seizure (HCC)     several years ago r/t alcohol withdrawl   • Ulcer    • Vitamin D deficiency        FAMILY HISTORY  Family History   Problem Relation Age of Onset   • Heart Disease Father    • " "Hypertension Father    • Diabetes Father    • Cancer Paternal Grandmother    • Depression Other    • Lung Disease Neg Hx    • Stroke Neg Hx        SOCIAL HISTORY  Social History   Substance Use Topics   • Smoking status: Current Every Day Smoker     Packs/day: 0.50     Years: 20.00     Types: Cigarettes   • Smokeless tobacco: Never Used      Comment: 1/2 pack per day   • Alcohol use Yes      Comment: \"lots of vodka\" currently intoxicated     History   Drug Use No       SURGICAL HISTORY  Past Surgical History:   Procedure Laterality Date   • CYSTOSCOPY STENT PLACEMENT  11/9/2010    Performed by ANGEL HARRIS at [facility]   • URETEROSCOPY  11/9/2010    Performed by ANGEL HARRIS at [facility]   • LASERTRIPSY  11/9/2010    Performed by ANGEL HARRIS at [facility]   • STENT REMOVAL  11/9/2010    Performed by ANGEL HARRIS at [facility]   • CYSTO STENT PLACEMNT PRE SURG  10/7/2010    Performed by ANGEL HARRIS at [facility]   • HERNIA REPAIR  1977       CURRENT MEDICATIONS  No current facility-administered medications on file prior to encounter.      Current Outpatient Prescriptions on File Prior to Encounter   Medication Sig Dispense Refill   • acetaminophen (TYLENOL) 325 MG Tab Take 2 Tabs by mouth every 6 hours as needed (Mild Pain; (Pain scale 1-3); Temp greater than 100.5 F). 30 Tab 0   • benzocaine (ANBESOL) 10 % Gel Spray 1 Application in mouth/throat every 6 hours as needed. 1 Tube 0   • ibuprofen (MOTRIN) 800 MG Tab Take 1 Tab by mouth every 6 hours as needed for Moderate Pain. 30 Tab    • loperamide (IMODIUM) 2 MG Cap Take 1 Cap by mouth 4 times a day as needed for Diarrhea (allow 2-3 BMs). 30 Cap    • fluoxetine (PROZAC) 40 MG capsule Take 40 mg by mouth every day.     • lisinopril (PRINIVIL) 10 MG Tab Take 10 mg by mouth every day.     • doxepin (SINEQUAN) 50 MG Cap Take 50 mg by mouth every bedtime.     • potassium chloride SA (KDUR) 20 " "MEQ Tab CR Take 20 mEq by mouth every day.     • lorazepam (ATIVAN) 1 MG Tab Take 1 mg by mouth 3 times a day.     • spironolactone (ALDACTONE) 25 MG Tab Take 25 mg by mouth every day. Unknown dose        ALLERGIES  Allergies   Allergen Reactions   • Sulfa Drugs Vomiting   • Toradol Vomiting   • Neurontin [Gabapentin]      Bowel incontinence     • Amoxicillin Rash     Reported by NAIDA on arrival to ED       PHYSICAL EXAM  VITAL SIGNS: /75   Pulse 79   Temp 37.2 °C (98.9 °F)   Resp 18   Ht 1.702 m (5' 7\")   Wt 81.6 kg (180 lb)   LMP 11/02/2015   BMI 28.19 kg/m²   Reviewed and borderline elevated blood pressure  Constitutional: Well developed, Well nourished. Very intoxicated.  HENT: Normocephalic, atraumatic, bilateral external ears normal, oropharynx moist, No exudates or erythema.   Eyes: Nystagmus. PERRLA, conjunctiva pink, no scleral icterus.   Cardiovascular: Regular rate and rhythm. No murmurs, rubs or gallops.  Respiratory: No wheezes or rales. Some rhonchi in bilateral lungs.  Abdominal:  Abdomen soft, non-tender, non distended. No rebound, or guarding.  Skin: No erythema, no rash.   Genitourinary: No costovertebral angle tenderness.   Musculoskeletal: No edema.   Neurologic: Very intoxicated. Alert & oriented x 3, cranial nerves 2-12 intact by passive exam. No focal deficit noted.  Psychiatric: Affect normal, Judgment normal, Mood normal.       DIFFERENTIAL DIAGNOSIS:  Alcohol intoxication, alcohol abuse, alcohol dependence, withdrawal.      ED COURSE:    2:38 PM - Patient seen and examined at bedside. Ordered POC breathalizer to evaluate. Will recheck.      MEDICAL DECISION MAKING:  This patient presents with alcohol intoxication dependence and abuse.  She had recent unremarkable labs that they do not need to be repeated.  Patient had mild anemia in August and surprisingly no alcoholic liver disease.  She requests detox facility.  Life skills consulted for referral to detox.  Patient denies " suicidal or homicidal ideation.    PLAN:  Discharge Medication List as of 9/18/2018  5:54 PM      START taking these medications    Details   diazepam (VALIUM) 10 MG tablet Take 1 Tab by mouth every four hours as needed (withdrawal and tremors) for up to 3 days., Disp-20 Tab, R-0, Print Rx Paper, For 3 days           Follow [facility]  Alcohol problems handout given    [address] [address]  [phone]  Schedule an appointment as soon as possible for a visit        CONDITION: Stable.     FINAL IMPRESSION  1. Acute alcoholic intoxication without complication (HCC)    2. Alcohol abuse          Rober GAN (Scribe), am scribing for, and in the presence of, Marcello Melton M.D..    Electronically signed by: Rober Naidu (Scribe), 9/18/2018    Marcello GAN M.D. personally performed the services described in this documentation, as scribed by Rober Naidu in my presence, and it is both accurate and complete. C    The note accurately reflects work and decisions made by me.  Marcello Melton  9/20/2018  7:54 AM

## 2018-09-18 NOTE — DISCHARGE INSTRUCTIONS
Alcohol Use Disorder  Go to inpatient detox. Stop drinking alcohol to excess. Follow up with Alcoholics Anonymous    You had a borderline or high normal blood pressure reading today.  This does not necessarily mean you have hypertension.  Please followup with your/a primary physician for comprehensive blood pressure evaluation and yearly fasting cholesterol assessment.  BP Readings from Last 3 Encounters:   09/07/18 155/98   09/07/18 134/76   08/15/18 114/76     .    Alcohol use disorder is when your drinking disrupts your daily life. When you have this condition, you drink too much alcohol and you cannot control your drinking.  Alcohol use disorder can cause serious problems with your physical health. It can affect your brain, heart, liver, pancreas, immune system, stomach, and intestines. Alcohol use disorder can increase your risk for certain cancers and cause problems with your mental health, such as depression, anxiety, psychosis, delirium, and dementia. People with this disorder risk hurting themselves and others.  What are the causes?  This condition is caused by drinking too much alcohol over time. It is not caused by drinking too much alcohol only one or two times. Some people with this condition drink alcohol to cope with or escape from negative life events. Others drink to relieve pain or symptoms of mental illness.  What increases the risk?  You are more likely to develop this condition if:  · You have a family history of alcohol use disorder.  · Your culture encourages drinking to the point of intoxication, or makes alcohol easy to get.  · You had a mood or conduct disorder in childhood.  · You have been a victim of abuse.  · You are an adolescent and:  ¨ You have poor grades or difficulties in school.  ¨ Your caregivers do not talk to you about saying no to alcohol, or supervise your activities.  ¨ You are impulsive or you have trouble with self-control.  What are the signs or symptoms?  Symptoms of  this condition include:  · Drinking more than you want to.  · Drinking for longer than you want to.  · Trying several times to drink less or to control your drinking.  · Spending a lot of time getting alcohol, drinking, or recovering from drinking.  · Craving alcohol.  · Having problems at work, at school, or at home due to drinking.  · Having problems in relationships due to drinking.  · Drinking when it is dangerous to drink, such as before driving a car.  · Continuing to drink even though you know you might have a physical or mental problem related to drinking.  · Needing more and more alcohol to get the same effect you want from the alcohol (building up tolerance).  · Having symptoms of withdrawal when you stop drinking. Symptoms of withdrawal include:  ¨ Fatigue.  ¨ Nightmares.  ¨ Trouble sleeping.  ¨ Depression.  ¨ Anxiety.  ¨ Fever.  ¨ Seizures.  ¨ Severe confusion.  ¨ Feeling or seeing things that are not there (hallucinations).  ¨ Tremors.  ¨ Rapid heart rate.  ¨ Rapid breathing.  ¨ High blood pressure.  · Drinking to avoid symptoms of withdrawal.  How is this diagnosed?  This condition is diagnosed with an assessment. Your health care provider may start the assessment by asking three or four questions about your drinking.  Your health care provider may perform a physical exam or do lab tests to see if you have physical problems resulting from alcohol use. She or he may refer you to a mental health professional for evaluation.  How is this treated?  Some people with alcohol use disorder are able to reduce their alcohol use to low-risk levels. Others need to completely quit drinking alcohol. When necessary, mental health professionals with specialized training in substance use treatment can help. Your health care provider can help you decide how severe your alcohol use disorder is and what type of treatment you need. The following forms of treatment are available:  · Detoxification. Detoxification involves  quitting drinking and using prescription medicines within the first week to help lessen withdrawal symptoms. This treatment is important for people who have had withdrawal symptoms before and for heavy drinkers who are likely to have withdrawal symptoms. Alcohol withdrawal can be dangerous, and in severe cases, it can cause death. Detoxification may be provided in a home, community, or primary care setting, or in a hospital or substance use treatment facility.  · Counseling. This treatment is also called talk therapy. It is provided by substance use treatment counselors. A counselor can address the reasons you use alcohol and suggest ways to keep you from drinking again or to prevent problem drinking. The goals of talk therapy are to:  ¨ Find healthy activities and ways for you to cope with stress.  ¨ Identify and avoid the things that trigger your alcohol use.  ¨ Help you learn how to handle cravings.  · Medicines. Medicines can help treat alcohol use disorder by:  ¨ Decreasing alcohol cravings.  ¨ Decreasing the positive feeling you have when you drink alcohol.  ¨ Causing an uncomfortable physical reaction when you drink alcohol (aversion therapy).  · Support groups. Support groups are led by people who have quit drinking. They provide emotional support, advice, and guidance.  These forms of treatment are often combined. Some people with this condition benefit from a combination of treatments provided by specialized substance use treatment centers.  Follow these instructions at home:  · Take over-the-counter and prescription medicines only as told by your health care provider.  · Check with your health care provider before starting any new medicines.  · Ask friends and family members not to offer you alcohol.  · Avoid situations where alcohol is served, including gatherings where others are drinking alcohol.  · Create a plan for what to do when you are tempted to use alcohol.  · Find hobbies or activities that you  enjoy that do not include alcohol.  · Keep all follow-up visits as told by your health care provider. This is important.  How is this prevented?  · If you drink, limit alcohol intake to no more than 1 drink a day for nonpregnant women and 2 drinks a day for men. One drink equals 12 oz of beer, 5 oz of wine, or 1½ oz of hard liquor.  · If you have a mental health condition, get treatment and support.  · Do not give alcohol to adolescents.  · If you are an adolescent:  ¨ Do not drink alcohol.  ¨ Do not be afraid to say no if someone offers you alcohol. Speak up about why you do not want to drink. You can be a positive role model for your friends and set a good example for those around you by not drinking alcohol.  ¨ If your friends drink, spend time with others who do not drink alcohol. Make new friends who do not use alcohol.  ¨ Find healthy ways to manage stress and emotions, such as meditation or deep breathing, exercise, spending time in nature, listening to music, or talking with a trusted friend or family member.  Contact a health care provider if:  · You are not able to take your medicines as told.  · Your symptoms get worse.  · You return to drinking alcohol (relapse) and your symptoms get worse.  Get help right away if:  · You have thoughts about hurting yourself or others.  If you ever feel like you may hurt yourself or others, or have thoughts about taking your own life, get help right away. You can go to your nearest emergency department or call:  · Your local emergency services (911 in the [place]  · A suicide crisis helpline, such as the National Suicide Prevention Lifeline at 1-698.671.5509. This is open 24 hours a day.  Summary  · Alcohol use disorder is when your drinking disrupts your daily life. When you have this condition, you drink too much alcohol and you cannot control your drinking.  · Treatment may include detoxification, counseling, medicine, and support groups.  · Ask friends and family  members not to offer you alcohol. Avoid situations where alcohol is served.  · Get help right away if you have thoughts about hurting yourself or others.  This information is not intended to replace advice given to you by your health care provider. Make sure you discuss any questions you have with your health care provider.  Document Released: 01/25/2006 Document Revised: 09/14/2017 Document Reviewed: 09/14/2017  [employer]vier Interactive Patient Education © 2017 [employer]

## 2018-09-18 NOTE — ED NOTES
Pt reports feeling hungry making various requests for different types of food.Options explained. Breakfast box meal provided to pt per pt request.

## 2018-09-19 ENCOUNTER — PATIENT OUTREACH (OUTPATIENT)
Dept: HEALTH INFORMATION MANAGEMENT | Facility: OTHER | Age: 58
End: 2018-09-19

## 2018-09-19 NOTE — LETTER
Ashleigh Marquis  [address] [place] [address]    September 19, 2018      Dear Ashleigh Marquis,    [facility] wants to ensure your discharge home is safe and you or your loved ones have had all of your questions answered regarding your care after you leave the hospital.    Our discharge team was unsuccessful in our attempts to contact you telephonically and we wanted to be sure that you had a list of resources and contact information should you have any questions regarding your hospital stay, follow-up instructions, or active medical symptoms.    Questions or Concerns Regarding… Contact   Medical Questions Related to Your Discharge  (7 days a week, 8am-5pm) Contact a Nurse Care Coordinator   [phone]   Medical Questions Not Related to Your Discharge  (24 hours a day / 7 days a week)  Contact the Nurse Health Line   [phone]    Medications or Discharge Instructions Refer to your discharge packet   or contact your -[phone]   Follow-up Appointment(s) Schedule your appointment via [employer]   or contact Scheduling [phone]   Billing Review your statement via [employer]  or contact Billing [phone]   Medical Records Review your records via [employer]   or contact Medical Records [phone]     You can also easily access your medical information, test results and upcoming appointments via the [employer] free online health management tool. You can learn more and sign up at [employer]. For assistance setting up your [employer] account, please call [phone].    Once again, we want to ensure your discharge home is safe and that you have a clear understanding of any next steps in your care. If you have any questions or concerns, please do not hesitate to contact us, we are here for you. Thank you for choosing [facility] for your healthcare needs.    Sincerely,      Your [facility] Healthcare Team

## 2018-09-19 NOTE — ED NOTES
"Pt reports plans to \"go back to [facility] ([facility])\" if possible. Pt expresses happiness with plan to use valium for withdrawals and get off alcohol.List of Chemical Dependency Community Resources  [facility] was provided to patient as a resource. Discussed Discharge instructions, F/U instructions, and medication instructions with Pt. Rx given, questions answered, recommended AA as previously stated in D/C instructions.   "

## 2018-09-20 ENCOUNTER — HOSPITAL ENCOUNTER (EMERGENCY)
Facility: MEDICAL CENTER | Age: 58
End: 2018-09-20
Attending: EMERGENCY MEDICINE
Payer: MEDICAID

## 2018-09-20 ENCOUNTER — APPOINTMENT (OUTPATIENT)
Dept: RADIOLOGY | Facility: MEDICAL CENTER | Age: 58
End: 2018-09-20
Attending: EMERGENCY MEDICINE
Payer: MEDICAID

## 2018-09-20 VITALS
SYSTOLIC BLOOD PRESSURE: 114 MMHG | DIASTOLIC BLOOD PRESSURE: 73 MMHG | HEART RATE: 86 BPM | TEMPERATURE: 98.2 F | HEIGHT: 67 IN | RESPIRATION RATE: 16 BRPM | OXYGEN SATURATION: 94 % | BODY MASS INDEX: 28.25 KG/M2 | WEIGHT: 180 LBS

## 2018-09-20 DIAGNOSIS — F10.929 ACUTE ALCOHOLIC INTOXICATION WITH COMPLICATION (HCC): ICD-10-CM

## 2018-09-20 LAB
ALBUMIN SERPL BCP-MCNC: 4.1 G/DL (ref 3.2–4.9)
ALBUMIN/GLOB SERPL: 1.3 G/DL
ALP SERPL-CCNC: 132 U/L (ref 30–99)
ALT SERPL-CCNC: 15 U/L (ref 2–50)
ANION GAP SERPL CALC-SCNC: 10 MMOL/L (ref 0–11.9)
AST SERPL-CCNC: 28 U/L (ref 12–45)
BASOPHILS # BLD AUTO: 0.9 % (ref 0–1.8)
BASOPHILS # BLD: 0.05 K/UL (ref 0–0.12)
BILIRUB SERPL-MCNC: 0.3 MG/DL (ref 0.1–1.5)
BUN SERPL-MCNC: 16 MG/DL (ref 8–22)
CALCIUM SERPL-MCNC: 8.7 MG/DL (ref 8.5–10.5)
CHLORIDE SERPL-SCNC: 108 MMOL/L (ref 96–112)
CO2 SERPL-SCNC: 24 MMOL/L (ref 20–33)
CREAT SERPL-MCNC: 0.82 MG/DL (ref 0.5–1.4)
EOSINOPHIL # BLD AUTO: 0.24 K/UL (ref 0–0.51)
EOSINOPHIL NFR BLD: 4.6 % (ref 0–6.9)
ERYTHROCYTE [DISTWIDTH] IN BLOOD BY AUTOMATED COUNT: 59.4 FL (ref 35.9–50)
ETHANOL BLD-MCNC: 0.29 G/DL
GLOBULIN SER CALC-MCNC: 3.2 G/DL (ref 1.9–3.5)
GLUCOSE SERPL-MCNC: 80 MG/DL (ref 65–99)
HCT VFR BLD AUTO: 37 % (ref 37–47)
HGB BLD-MCNC: 11.2 G/DL (ref 12–16)
LIPASE SERPL-CCNC: 61 U/L (ref 11–82)
LYMPHOCYTES # BLD AUTO: 2.6 K/UL (ref 1–4.8)
LYMPHOCYTES NFR BLD: 50 % (ref 22–41)
MANUAL DIFF BLD: NORMAL
MCH RBC QN AUTO: 28.7 PG (ref 27–33)
MCHC RBC AUTO-ENTMCNC: 30.3 G/DL (ref 33.6–35)
MCV RBC AUTO: 94.9 FL (ref 81.4–97.8)
MONOCYTES # BLD AUTO: 0.14 K/UL (ref 0–0.85)
MONOCYTES NFR BLD AUTO: 2.7 % (ref 0–13.4)
MORPHOLOGY BLD-IMP: NORMAL
NEUTROPHILS # BLD AUTO: 2.17 K/UL (ref 2–7.15)
NEUTROPHILS NFR BLD: 41.8 % (ref 44–72)
NRBC # BLD AUTO: 0 K/UL
NRBC BLD-RTO: 0 /100 WBC
PLATELET # BLD AUTO: 293 K/UL (ref 164–446)
PLATELET BLD QL SMEAR: NORMAL
PMV BLD AUTO: 10.1 FL (ref 9–12.9)
POTASSIUM SERPL-SCNC: 3.3 MMOL/L (ref 3.6–5.5)
PROT SERPL-MCNC: 7.3 G/DL (ref 6–8.2)
RBC # BLD AUTO: 3.9 M/UL (ref 4.2–5.4)
RBC BLD AUTO: NORMAL
SODIUM SERPL-SCNC: 142 MMOL/L (ref 135–145)
WBC # BLD AUTO: 5.2 K/UL (ref 4.8–10.8)

## 2018-09-20 PROCEDURE — 73610 X-RAY EXAM OF ANKLE: CPT | Mod: RT

## 2018-09-20 PROCEDURE — 96365 THER/PROPH/DIAG IV INF INIT: CPT

## 2018-09-20 PROCEDURE — 99285 EMERGENCY DEPT VISIT HI MDM: CPT

## 2018-09-20 PROCEDURE — 83690 ASSAY OF LIPASE: CPT

## 2018-09-20 PROCEDURE — 80307 DRUG TEST PRSMV CHEM ANLYZR: CPT

## 2018-09-20 PROCEDURE — 36415 COLL VENOUS BLD VENIPUNCTURE: CPT

## 2018-09-20 PROCEDURE — 85027 COMPLETE CBC AUTOMATED: CPT

## 2018-09-20 PROCEDURE — 85007 BL SMEAR W/DIFF WBC COUNT: CPT

## 2018-09-20 PROCEDURE — 700101 HCHG RX REV CODE 250: Performed by: EMERGENCY MEDICINE

## 2018-09-20 PROCEDURE — 80053 COMPREHEN METABOLIC PANEL: CPT

## 2018-09-20 PROCEDURE — 70450 CT HEAD/BRAIN W/O DYE: CPT

## 2018-09-20 RX ADMIN — THIAMINE HYDROCHLORIDE: 100 INJECTION, SOLUTION INTRAMUSCULAR; INTRAVENOUS at 17:20

## 2018-09-20 NOTE — ED TRIAGE NOTES
Chief Complaint   Patient presents with   • Alcohol Intoxication     Pt BIB EMS, pt found to be intoxicated. No medical complaints at this time. Pt admits to drink vodka.

## 2018-09-20 NOTE — ED PROVIDER NOTES
ED Provider Note    Scribed for Dacia Stafford D.O. by Homar Ricks. 9/20/2018, 4:12 PM.    Primary care provider: Pcp Pt States None  Means of arrival: Ambulance  History obtained from: Patient  History limited by: Altered mental status     CHIEF COMPLAINT  Chief Complaint   Patient presents with   • Alcohol Intoxication       HPI  Ashleigh Marquis is a 57 y.o. female who presents to the Emergency Department with a chief complaint of acute alcohol intoxication. The patient reports that within the last few hours she has drank a large amount of alcohol but is unable to quantify exactly how much. Today she was found on the street on the sidewalk and reportedly she had a ground level fall which resulted in her injuring her right ankle and right knee. She reports that she did not sustain any head trauma. She also notes that she has had multiple episodes of vomiting and she attributes this to alcohol withdrawals. Patient admits to taking Prilosec every morning. She also admits to having a history of GERD. The patient denies any abdominal pain.     HPI is limited secondary to altered mental status      REVIEW OF SYSTEMS  See HPI for further details.     ROS is limited secondary to altered mental status    PAST MEDICAL HISTORY   has a past medical history of Alcoholism ([facility]); Anxiety; Arthritis; ASTHMA; Cancer (HCC) (1981); Chronic low back pain; Congestive heart failure (HCC); Depression; EtOH dependence ([facility]); Fall; GERD (gastroesophageal reflux disease); HTN; Hypertension; Indigestion; Muscle disorder; OSTEOPOROSIS; Other specified symptom associated with female genital organs; Psychiatric disorder; PTSD (post-traumatic stress disorder); Renal disorder; Seizure (HCC); Ulcer; and Vitamin D deficiency.    SURGICAL HISTORY   has a past surgical history that includes hernia repair (1977); cysto stent placemnt pre surg (10/7/2010); cystoscopy stent placement (11/9/2010); ureteroscopy (11/9/2010); lasertripsy (11/9/2010);  "and stent removal (11/9/2010).    SOCIAL HISTORY  Social History   Substance Use Topics   • Smoking status: Current Every Day Smoker     Packs/day: 0.50     Years: 20.00     Types: Cigarettes   • Smokeless tobacco: Never Used      Comment: 1/2 pack per day   • Alcohol use Yes      Comment: \"lots of vodka\" currently intoxicated      History   Drug Use No       FAMILY HISTORY  Family History   Problem Relation Age of Onset   • Heart Disease Father    • Hypertension Father    • Diabetes Father    • Cancer Paternal Grandmother    • Depression Other    • Lung Disease Neg Hx    • Stroke Neg Hx        CURRENT MEDICATIONS  Reviewed.  See Encounter Summary.     ALLERGIES  Allergies   Allergen Reactions   • Sulfa Drugs Vomiting   • Toradol Vomiting   • Neurontin [Gabapentin]      Bowel incontinence     • Amoxicillin Rash     Reported by NAIDA on arrival to ED       PHYSICAL EXAM  VITAL SIGNS: /73   Pulse 81   Temp 36.8 °C (98.2 °F)   Resp 12   Ht 1.702 m (5' 7\")   Wt 81.6 kg (180 lb)   LMP 11/02/2015   SpO2 94%   BMI 28.19 kg/m²   Constitutional: Patient smells strongly of alcohol. Appears older than stated age, poor hygiene.   HENT: Normocephalic atraumatic. Bilateral external ears normal. Nose normal. Mucous membranes are moist.  Eyes: Pupils are equal and reactive. Conjunctiva normal. Non-icteric sclera.   Neck: Normal range of motion without tenderness. Supple. No meningeal signs.  Cardiovascular: Regular rate and rhythm. No murmurs, gallops or rubs.  Thorax & Lungs: Breath sounds are clear to auscultation bilaterally. No wheezing, rhonchi or rales.  Abdomen: Soft, nontender and nondistended. No peritoneal signs noted.  Skin: Warm and dry. No rashes are noted.  Back: No bony tenderness, No CVA tenderness.   Extremities: 2+ peripheral pulses. Cap refill is less than 2 seconds. No edema, cyanosis, or clubbing.  Musculoskeletal: Good range of motion in all major joints. No tenderness to palpation or major " deformities noted. Swelling at the right ankle  Neurologic: Alert and oriented ×3. The patient moves all 4 extremities and follows commands.  Psychiatric: Affect is normal. Judgment appears to be intact.     DIAGNOSTIC STUDIES / PROCEDURES     LABS  Results for orders placed or performed during the hospital encounter of 09/20/18   CBC WITH DIFFERENTIAL   Result Value Ref Range    WBC 5.2 4.8 - 10.8 K/uL    RBC 3.90 (L) 4.20 - 5.40 M/uL    Hemoglobin 11.2 (L) 12.0 - 16.0 g/dL    Hematocrit 37.0 37.0 - 47.0 %    MCV 94.9 81.4 - 97.8 fL    MCH 28.7 27.0 - 33.0 pg    MCHC 30.3 (L) 33.6 - 35.0 g/dL    RDW 59.4 (H) 35.9 - 50.0 fL    Platelet Count 293 164 - 446 K/uL    MPV 10.1 9.0 - 12.9 fL    Nucleated RBC 0.00 /100 WBC    NRBC (Absolute) 0.00 K/uL    Neutrophils-Polys 41.80 (L) 44.00 - 72.00 %    Lymphocytes 50.00 (H) 22.00 - 41.00 %    Monocytes 2.70 0.00 - 13.40 %    Eosinophils 4.60 0.00 - 6.90 %    Basophils 0.90 0.00 - 1.80 %    Neutrophils (Absolute) 2.17 2.00 - 7.15 K/uL    Lymphs (Absolute) 2.60 1.00 - 4.80 K/uL    Monos (Absolute) 0.14 0.00 - 0.85 K/uL    Eos (Absolute) 0.24 0.00 - 0.51 K/uL    Baso (Absolute) 0.05 0.00 - 0.12 K/uL   COMP METABOLIC PANEL   Result Value Ref Range    Sodium 142 135 - 145 mmol/L    Potassium 3.3 (L) 3.6 - 5.5 mmol/L    Chloride 108 96 - 112 mmol/L    Co2 24 20 - 33 mmol/L    Anion Gap 10.0 0.0 - 11.9    Glucose 80 65 - 99 mg/dL    Bun 16 8 - 22 mg/dL    Creatinine 0.82 0.50 - 1.40 mg/dL    Calcium 8.7 8.5 - 10.5 mg/dL    AST(SGOT) 28 12 - 45 U/L    ALT(SGPT) 15 2 - 50 U/L    Alkaline Phosphatase 132 (H) 30 - 99 U/L    Total Bilirubin 0.3 0.1 - 1.5 mg/dL    Albumin 4.1 3.2 - 4.9 g/dL    Total Protein 7.3 6.0 - 8.2 g/dL    Globulin 3.2 1.9 - 3.5 g/dL    A-G Ratio 1.3 g/dL   LIPASE   Result Value Ref Range    Lipase 61 11 - 82 U/L   DIAGNOSTIC ALCOHOL   Result Value Ref Range    Diagnostic Alcohol 0.29 (H) 0.00 g/dL   ESTIMATED GFR   Result Value Ref Range    GFR If   >60 >60 mL/min/1.73 m 2    GFR If Non African American >60 >60 mL/min/1.73 m 2   MORPHOLOGY   Result Value Ref Range    RBC Morphology Normal    PERIPHERAL SMEAR REVIEW   Result Value Ref Range    Peripheral Smear Review see below    DIFFERENTIAL MANUAL   Result Value Ref Range    Manual Diff Status PERFORMED    PLATELET ESTIMATE   Result Value Ref Range    Plt Estimation Normal        All labs were reviewed by me.    RADIOLOGY  CT-HEAD W/O   Final Result         1. No acute intracranial abnormality. No evidence of acute intracranial hemorrhage or mass lesion.               DX-ANKLE 3+ VIEWS RIGHT   Final Result      No acute osseous abnormality.        The radiologist's interpretation of all radiological studies have been reviewed by me.    COURSE & MEDICAL DECISION MAKING  Pertinent Labs & Imaging studies reviewed. (See chart for details)    4:12 PM - Patient seen and examined at bedside. Patient will be treated with ER detox IV 1000 mL infusion. Ordered CT-head w/o, DX-ankle 3+ views right, CBC with differential, CMP, lipase, urinalysis, diagnostic alcohol to evaluate her symptoms.     Decision Making:  This is a 57 y.o. year old female who presents with acute alcohol intoxication. On initial evaluation, the patient did appear intoxicated but was answering questions appropriately. She was noted to have some mild swelling of her right ankle and admitted to falling. Plain films of the right ankle did not reveal any acute fracture or dislocation. I did also obtain a CT of the head given her intoxication and this did not reveal any acute intracranial abnormality. The remainder of her labs were unremarkable. An IV had initially been placed for an ER detox IV fluid bag, but the IV was lost. Her alcohol level is noted to be 0.29 The patient was observed in the emergency department for approximately 4 hours. Per nursing report, the patient was walking with a steady gait without assistance and talking appropriate. Again  per nursing report she was clinically sober. The patient signed out AMA per nursing prior to my reevaluation.    FINAL IMPRESSION  1. Acute alcoholic intoxication with complication (HCC)          Homar GAN (Scribe), am scribing for, and in the presence of, Dacia tSafford D.O..    Electronically signed by: Homar Ricks (Scribe), 9/20/2018    IDacia D.O. personally performed the services described in this documentation, as scribed by Homar Ricks in my presence, and it is both accurate and complete. C.     The note accurately reflects work and decisions made by me.  Dacia Stafford  9/21/2018  12:20 AM

## 2018-09-21 ENCOUNTER — HOSPITAL ENCOUNTER (EMERGENCY)
Facility: MEDICAL CENTER | Age: 58
End: 2018-09-21
Attending: EMERGENCY MEDICINE
Payer: MEDICAID

## 2018-09-21 VITALS
SYSTOLIC BLOOD PRESSURE: 111 MMHG | HEART RATE: 80 BPM | HEIGHT: 65 IN | WEIGHT: 180 LBS | OXYGEN SATURATION: 95 % | TEMPERATURE: 96.9 F | RESPIRATION RATE: 16 BRPM | DIASTOLIC BLOOD PRESSURE: 74 MMHG | BODY MASS INDEX: 29.99 KG/M2

## 2018-09-21 DIAGNOSIS — Z76.5 MALINGERING: ICD-10-CM

## 2018-09-21 DIAGNOSIS — F10.10 ALCOHOL ABUSE: ICD-10-CM

## 2018-09-21 DIAGNOSIS — F10.20 ALCOHOLISM (HCC): ICD-10-CM

## 2018-09-21 PROCEDURE — 36415 COLL VENOUS BLD VENIPUNCTURE: CPT

## 2018-09-21 PROCEDURE — 99284 EMERGENCY DEPT VISIT MOD MDM: CPT

## 2018-09-21 ASSESSMENT — PAIN SCALES - GENERAL: PAINLEVEL_OUTOF10: 0

## 2018-09-21 NOTE — ED NOTES
Pt seen & eval by sue guerra. Cleared from l2k. Dressed self. Assisted to lobby. Given dc inst. Verbalizes understanding for rehab/detox facility.

## 2018-09-21 NOTE — ED NOTES
Pt in room resting. Pt chest rise/fall even/unlabored. Pt exhibits no acute distress. Pt denies any current needs. Will continue to monitor.

## 2018-09-21 NOTE — ED TRIAGE NOTES
Chief Complaint   Patient presents with   • Legal 2000     petitioned by pd for +s/i. +etoh & lorazepam   changed into gown. Belongings to security. All hazards removed from room. Urine sample obtained. Tbs.

## 2018-09-21 NOTE — ED NOTES
Received report from Amina IRENE. Assumed care of pt at this time. Pt resp even and nl. Bed locked in low position, call light within reach. Denies needs at this time. Will continue to monitor.
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
## 2018-09-21 NOTE — ED NOTES
"Pt is A&Ox4. Pt ambulates with a steady gait. Pt left AMA. Pt refuses POC breathalyzer prior to departure. Pt denies transportation needs, and states, \" I have a bus pass.\" Dr Stafford notified of pts departure.   "

## 2018-09-23 ENCOUNTER — HOSPITAL ENCOUNTER (EMERGENCY)
Facility: MEDICAL CENTER | Age: 58
End: 2018-09-24
Attending: EMERGENCY MEDICINE
Payer: MEDICAID

## 2018-09-23 ENCOUNTER — APPOINTMENT (OUTPATIENT)
Dept: RADIOLOGY | Facility: MEDICAL CENTER | Age: 58
End: 2018-09-23
Attending: EMERGENCY MEDICINE
Payer: MEDICAID
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
## 2018-09-23 DIAGNOSIS — F10.929 ALCOHOLIC INTOXICATION WITH COMPLICATION (HCC): ICD-10-CM

## 2018-09-23 PROCEDURE — 70450 CT HEAD/BRAIN W/O DYE: CPT

## 2018-09-23 PROCEDURE — 99284 EMERGENCY DEPT VISIT MOD MDM: CPT

## 2018-09-23 PROCEDURE — 72125 CT NECK SPINE W/O DYE: CPT

## 2018-09-23 ASSESSMENT — PAIN SCALES - GENERAL: PAINLEVEL_OUTOF10: 8

## 2018-09-24 ENCOUNTER — HOSPITAL ENCOUNTER (EMERGENCY)
Facility: MEDICAL CENTER | Age: 58
End: 2018-09-24
Attending: EMERGENCY MEDICINE
Payer: MEDICAID

## 2018-09-24 VITALS
HEART RATE: 81 BPM | SYSTOLIC BLOOD PRESSURE: 127 MMHG | TEMPERATURE: 97.8 F | RESPIRATION RATE: 15 BRPM | WEIGHT: 199.96 LBS | DIASTOLIC BLOOD PRESSURE: 88 MMHG | BODY MASS INDEX: 35.43 KG/M2 | OXYGEN SATURATION: 95 % | HEIGHT: 63 IN

## 2018-09-24 VITALS
HEART RATE: 88 BPM | SYSTOLIC BLOOD PRESSURE: 111 MMHG | WEIGHT: 200 LBS | RESPIRATION RATE: 18 BRPM | TEMPERATURE: 98.1 F | DIASTOLIC BLOOD PRESSURE: 78 MMHG | OXYGEN SATURATION: 92 % | BODY MASS INDEX: 32.14 KG/M2 | HEIGHT: 66 IN

## 2018-09-24 DIAGNOSIS — F10.920 ACUTE ALCOHOLIC INTOXICATION WITHOUT COMPLICATION (HCC): ICD-10-CM

## 2018-09-24 PROCEDURE — 99284 EMERGENCY DEPT VISIT MOD MDM: CPT

## 2018-09-24 ASSESSMENT — PAIN SCALES - GENERAL: PAINLEVEL_OUTOF10: 0

## 2018-09-24 NOTE — ED NOTES
Hourly rounding performed. Assessed patient complaints and Bathroom/comfort needs.    Pt resting w/o complaints
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
## 2018-09-24 NOTE — ED NOTES
Was able to wake patient up, still slurring her speech. RN/MD going to let pt sleep a little longer to sober up prior to Discharge

## 2018-09-24 NOTE — PROGRESS NOTES
[facility] . Discharge instructions were discussed by RN with patient and/or Family. No questions at this time. Medications were discussed with patient. VS within normal limits or have been addressed with patient and MD.
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
## 2018-09-24 NOTE — ED NOTES
Hourly rounding performed. Assessed patient complaints and Bathroom/comfort needs.    Noted that pt's O2 sats were slightly low, but she is not tolerating a NC

## 2018-09-25 NOTE — ED PROVIDER NOTES
"ED Provider Note    CHIEF COMPLAINT  Chief Complaint   Patient presents with   • Alcohol Intoxication       HPI  Ashleigh Marquis is a 57 y.o. female who presents with alcohol intoxication, she is well known to this department with numerous presentations for the same, she was found to bus stop today apparently too intoxicated so she was brought in.  She has no complaints, no chest pain, shortness of breath, abdominal pain or vomiting, the only thing she really is upset about is that she has not had anything to eat today.    REVIEW OF SYSTEMS  Negative for fever, rash, chest pain, dyspnea, abdominal pain, nausea, vomiting, diarrhea, headache, focal weakness, focal numbness, focal tingling, back pain. All other systems are negative.     PAST MEDICAL HISTORY  Past Medical History:   Diagnosis Date   • Alcoholism (HCC)    • Anxiety    • Arthritis    • ASTHMA    • Cancer (HCC) 1981    cervical   • Chronic low back pain    • Congestive heart failure (HCC)    • Depression    • EtOH dependence (HCC)    • Fall     alcohol related   • GERD (gastroesophageal reflux disease)    • HTN    • Hypertension    • Indigestion     GERD   • Muscle disorder    • OSTEOPOROSIS    • Other specified symptom associated with female genital organs     \"I have fibroids bad\"\"   • Psychiatric disorder    • PTSD (post-traumatic stress disorder)    • Renal disorder     Hx of stones   • Seizure (HCC)     several years ago r/t alcohol withdrawl   • Ulcer    • Vitamin D deficiency        FAMILY HISTORY  Family History   Problem Relation Age of Onset   • Heart Disease Father    • Hypertension Father    • Diabetes Father    • Cancer Paternal Grandmother    • Depression Other    • Lung Disease Neg Hx    • Stroke Neg Hx        SOCIAL HISTORY  Social History   Substance Use Topics   • Smoking status: Current Every Day Smoker     Packs/day: 0.50     Years: 20.00     Types: Cigarettes   • Smokeless tobacco: Never Used      Comment: 1/2 pack per day   • " "Alcohol use Yes      Comment: \"lots of vodka\" currently intoxicated       SURGICAL HISTORY  Past Surgical History:   Procedure Laterality Date   • CYSTOSCOPY STENT PLACEMENT  11/9/2010    Performed by ANGEL HARRIS at SURGERY [facility]   • URETEROSCOPY  11/9/2010    Performed by ANGEL HARRIS at SURGERY [facility]   • LASERTRIPSY  11/9/2010    Performed by ANGEL HARRIS at SURGERY [facility]   • STENT REMOVAL  11/9/2010    Performed by ANGLE HARRIS at SURGERY [facility]   • CYSTO STENT PLACEMNT PRE SURG  10/7/2010    Performed by ANGEL HARRIS at SURGERY [facility]   • HERNIA REPAIR  1977       CURRENT MEDICATIONS  I personally reviewed the medication list in the charting documentation.     ALLERGIES  Allergies   Allergen Reactions   • Sulfa Drugs Vomiting   • Toradol Vomiting   • Neurontin [Gabapentin]      Bowel incontinence     • Amoxicillin Rash     Reported by NAIDA on arrival to ED       MEDICAL RECORD  I have reviewed patient's medical record and pertinent results are listed above.      PHYSICAL EXAM  VITAL SIGNS: /88   Pulse 81   Temp 36.6 °C (97.8 °F)   Resp 15   Ht 1.6 m (5' 3\")   Wt 90.7 kg (199 lb 15.3 oz)   LMP 11/02/2015   SpO2 95%   BMI 35.42 kg/m²    Constitutional: Generally well-appearing, disheveled, nontoxic  HENT: Mucus membranes moist.    Eyes: No scleral icterus. Normal conjunctiva   Neck: Supple, comfortable, nonpainful range of motion.   Cardiovascular: Regular heart rate and rhythm.   Thorax & Lungs: Chest is nontender.  Lungs are clear to auscultation with good air movement bilaterally.  No wheeze, rhonchi, nor rales.   Abdomen: Soft, with no tenderness, rebound nor guarding.  No mass or pulsatile mass appreciated.  Skin: Warm, dry. No rash appreciated  Extremities/Musculoskeletal: No sign of trauma. No asymmetric calf tenderness, erythema or edema. Normal range of motion   Neurologic: Slurred speech, otherwise no obvious focal " deficits  Psychiatric: Normal affect appropriate for the clinical situation.    COURSE & MEDICAL DECISION MAKING  I have reviewed any medical record information, laboratory studies and radiographic results as noted above.    Encounter Summary: This is a 57 y.o. female with alcohol intoxication, many presentations for the same here at this department, she is ambulating independently with no difficulty, yes she has some slurred speech and evidence of intoxication but really seem to be able to care for herself, no emergent needs at this time, she wishes to go home which I think is appropriate, stable and appropriate for discharge with strict return instructions discussed      DISPOSITION: Discharge home in stable condition      FINAL IMPRESSION  1. Acute alcoholic intoxication without complication (HCC)           This dictation was created using voice recognition software. The accuracy of the dictation is limited to the abilities of the software. I expect there may be some errors of grammar and possibly content. The nursing notes were reviewed and certain aspects of this information were incorporated into this note.    Electronically signed by: Ronal Barber, 9/24/2018 9:12 PM

## 2018-09-25 NOTE — ED NOTES
D/c instructions reviewed with pt. Pt verbalized understanding. Pt ambulatory out of department with steady gait. Pt in nad.

## 2018-09-25 NOTE — ED NOTES
"Pt up ambulatory out of department with steady gait. Pt a &o x3. Pt reports she no longer wants to be seen in er because \"you aren't doing anything for me and aren't giving me food.\" pt seen in er for same yesterday. pt ambulatory out to lobby. Pt brought back to er room by er staff. Pt ambulatory to bathroom. Pt continues to demand food, now attempting to determine if she wants to stay in er or leave.   "

## 2018-09-25 NOTE — ED TRIAGE NOTES
[place] for alcohol intoxication.  Pt drank 3 pints of vodka today.  Pt FSG for EMS was 161.

## 2018-09-26 ENCOUNTER — HOSPITAL ENCOUNTER (EMERGENCY)
Dept: HOSPITAL 8 - ED | Age: 58
Discharge: HOME | End: 2018-09-26
Payer: MEDICAID

## 2018-09-26 ENCOUNTER — HOSPITAL ENCOUNTER (EMERGENCY)
Facility: MEDICAL CENTER | Age: 58
End: 2018-09-26
Attending: EMERGENCY MEDICINE
Payer: MEDICAID

## 2018-09-26 VITALS
HEART RATE: 76 BPM | WEIGHT: 199 LBS | HEIGHT: 63 IN | TEMPERATURE: 97.3 F | RESPIRATION RATE: 16 BRPM | BODY MASS INDEX: 35.26 KG/M2 | SYSTOLIC BLOOD PRESSURE: 117 MMHG | OXYGEN SATURATION: 95 % | DIASTOLIC BLOOD PRESSURE: 82 MMHG

## 2018-09-26 VITALS — BODY MASS INDEX: 32.42 KG/M2 | HEIGHT: 63 IN | WEIGHT: 182.98 LBS

## 2018-09-26 VITALS — DIASTOLIC BLOOD PRESSURE: 76 MMHG | SYSTOLIC BLOOD PRESSURE: 130 MMHG

## 2018-09-26 DIAGNOSIS — F17.200: ICD-10-CM

## 2018-09-26 DIAGNOSIS — F32.9: ICD-10-CM

## 2018-09-26 DIAGNOSIS — K21.9: ICD-10-CM

## 2018-09-26 DIAGNOSIS — F10.120: Primary | ICD-10-CM

## 2018-09-26 DIAGNOSIS — I10: ICD-10-CM

## 2018-09-26 DIAGNOSIS — F10.920 ALCOHOLIC INTOXICATION WITHOUT COMPLICATION (HCC): ICD-10-CM

## 2018-09-26 DIAGNOSIS — R30.0: ICD-10-CM

## 2018-09-26 LAB
ALBUMIN SERPL-MCNC: 3.2 G/DL (ref 3.4–5)
ANION GAP SERPL CALC-SCNC: 14 MMOL/L (ref 5–15)
BASOPHILS # BLD AUTO: 0.06 X10^3/UL (ref 0–0.1)
BASOPHILS NFR BLD AUTO: 1 % (ref 0–1)
CALCIUM SERPL-MCNC: 8.4 MG/DL (ref 8.5–10.1)
CHLORIDE SERPL-SCNC: 106 MMOL/L (ref 98–107)
CREAT SERPL-MCNC: 0.8 MG/DL (ref 0.55–1.02)
CULTURE INDICATED?: YES
EOSINOPHIL # BLD AUTO: 0.11 X10^3/UL (ref 0–0.4)
EOSINOPHIL NFR BLD AUTO: 2 % (ref 1–7)
ERYTHROCYTE [DISTWIDTH] IN BLOOD BY AUTOMATED COUNT: 19.3 % (ref 9.6–15.2)
LYMPHOCYTES # BLD AUTO: 1.57 X10^3/UL (ref 1–3.4)
LYMPHOCYTES NFR BLD AUTO: 26 % (ref 22–44)
MCH RBC QN AUTO: 29.1 PG (ref 27–34.8)
MCHC RBC AUTO-ENTMCNC: 32.6 G/DL (ref 32.4–35.8)
MCV RBC AUTO: 89.2 FL (ref 80–100)
MD: NO
MICROSCOPIC: (no result)
MONOCYTES # BLD AUTO: 0.46 X10^3/UL (ref 0.2–0.8)
MONOCYTES NFR BLD AUTO: 8 % (ref 2–9)
NEUTROPHILS # BLD AUTO: 3.84 X10^3/UL (ref 1.8–6.8)
NEUTROPHILS NFR BLD AUTO: 64 % (ref 42–75)
PLATELET # BLD AUTO: 403 X10^3/UL (ref 130–400)
PMV BLD AUTO: 7.5 FL (ref 7.4–10.4)
RBC # BLD AUTO: 3.82 X10^6/UL (ref 3.82–5.3)

## 2018-09-26 PROCEDURE — 93005 ELECTROCARDIOGRAM TRACING: CPT

## 2018-09-26 PROCEDURE — 81001 URINALYSIS AUTO W/SCOPE: CPT

## 2018-09-26 PROCEDURE — 82040 ASSAY OF SERUM ALBUMIN: CPT

## 2018-09-26 PROCEDURE — 85025 COMPLETE CBC W/AUTO DIFF WBC: CPT

## 2018-09-26 PROCEDURE — 80048 BASIC METABOLIC PNL TOTAL CA: CPT

## 2018-09-26 PROCEDURE — 87086 URINE CULTURE/COLONY COUNT: CPT

## 2018-09-26 PROCEDURE — 99284 EMERGENCY DEPT VISIT MOD MDM: CPT

## 2018-09-26 PROCEDURE — 99285 EMERGENCY DEPT VISIT HI MDM: CPT

## 2018-09-26 PROCEDURE — 36415 COLL VENOUS BLD VENIPUNCTURE: CPT

## 2018-09-26 ASSESSMENT — PAIN SCALES - GENERAL: PAINLEVEL_OUTOF10: 0

## 2018-09-26 NOTE — ED NOTES
Pt remains in bed, eyes closed, respirations even and unlabored. [place] map at bedside to monitor pulse ox and BP. Vitals WNL.

## 2018-09-26 NOTE — ED NOTES
Pt ambulated to restroom, with balance assistance via staff. Denies any needs or concerns at this time.

## 2018-09-26 NOTE — ED PROVIDER NOTES
"ED Provider Note    CHIEF COMPLAINT  Chief Complaint   Patient presents with   • Alcohol Intoxication       HPI  Ashleigh Marquis is a 57 y.o. female who presents with alcohol intoxication.  The patient's been seen here in the emergency department numerous times.  She was last seen on the 24th for similar incident.  The patient has no complaints at this time.  She denies any chest pain shortness of breath or any vomiting.  There are no fevers or chills.    REVIEW OF SYSTEMS  See HPI for further details. All other systems are negative.     PAST MEDICAL HISTORY   has a past medical history of Alcoholism ([facility]); Anxiety; Arthritis; ASTHMA; Cancer (HCC) (1981); Chronic low back pain; Congestive heart failure ([facility]); Depression; EtOH dependence ([facility]); Fall; GERD (gastroesophageal reflux disease); HTN; Hypertension; Indigestion; Muscle disorder; OSTEOPOROSIS; Other specified symptom associated with female genital organs; Psychiatric disorder; PTSD (post-traumatic stress disorder); Renal disorder; Seizure ([facility]); Ulcer; and Vitamin D deficiency.    SOCIAL HISTORY  Social History     Social History Main Topics   • Smoking status: Current Every Day Smoker     Packs/day: 0.50     Years: 20.00     Types: Cigarettes   • Smokeless tobacco: Never Used      Comment: 1/2 pack per day   • Alcohol use Yes      Comment: \"lots of vodka\" currently intoxicated   • Drug use: No   • Sexual activity: No       SURGICAL HISTORY   has a past surgical history that includes hernia repair (1977); cysto stent placemnt pre surg (10/7/2010); cystoscopy stent placement (11/9/2010); ureteroscopy (11/9/2010); lasertripsy (11/9/2010); and stent removal (11/9/2010).    CURRENT MEDICATIONS  Home Medications     Reviewed by Melisa Sommers R.N. (Registered Nurse) on 09/26/18 at 0155  Med List Status: Not Addressed   Medication Last Dose Status   acetaminophen (TYLENOL) 325 MG Tab  Active   benzocaine (ANBESOL) 10 % Gel  Active   doxepin (SINEQUAN) 50 " "MG Cap unknown Active   fluoxetine (PROZAC) 40 MG capsule unknown Active   ibuprofen (MOTRIN) 800 MG Tab  Active   lisinopril (PRINIVIL) 10 MG Tab unknown Active   loperamide (IMODIUM) 2 MG Cap  Active   lorazepam (ATIVAN) 1 MG Tab unknown Active   potassium chloride SA (KDUR) 20 MEQ Tab CR unknown Active   spironolactone (ALDACTONE) 25 MG Tab unknown Active                ALLERGIES  Allergies   Allergen Reactions   • Sulfa Drugs Vomiting   • Toradol Vomiting   • Neurontin [Gabapentin]      Bowel incontinence     • Amoxicillin Rash     Reported by NAIDA on arrival to ED       PHYSICAL EXAM  VITAL SIGNS: /82   Pulse 76   Temp 36.3 °C (97.3 °F)   Resp 16   Ht 1.6 m (5' 3\")   Wt 90.3 kg (199 lb)   LMP 11/02/2015   SpO2 95%   BMI 35.25 kg/m²  @[id number]::@  Pulse ox interpretation: I interpret this pulse ox as normal.  Constitutional: Intoxicating and smelling of alcohol and slurring words.  HENT: Normocephalic, Atraumatic, Bilateral external ears normal. Nose normal.   Eyes: Pupils are equal and reactive. Conjunctiva normal, non-icteric.   Heart: Regular rate and rythm, no murmurs.    Lungs: Clear to auscultation bilaterally.  Skin: Warm, Dry, No erythema, No rash.   Neurologic: Slurring words and intoxicated appearing  Psychiatric: Does not appear depressed.,  intoxicated.           COURSE & MEDICAL DECISION MAKING  Pertinent Labs & Imaging studies reviewed. (See chart for details)    57-year-old female who is a chronic alcoholic who presents with alcohol intoxication.  There is no evidence of trauma on exam.  At this time she has no significant complaints.  I will watch her on the monitor to evaluate for any respiratory depression in the discharge the patient when she is clinically capable of discharge and not intoxicated appearing.      7:04 AM-patient is no longer intoxicated appearing.  She is eating food.  Will be discharged home with strict return precautions.    The patient will not drink alcohol " nor drive with prescribed medications. The patient will return for worsening symptoms and is stable at the time of discharge. The patient verbalizes understanding and will comply.    FINAL IMPRESSION  1. Alcoholic intoxication without complication (HCC) Active             Electronically signed by: Joseph Grace, 9/26/2018 2:07 AM

## 2018-09-26 NOTE — ED NOTES
Pt awake, no further need for oxygen therapy due to pt status of being awake and more sober. Provided with a hot meal, coffee, and juice per request. Pt aware of impending discharge, verbalizes understanding. Denies further needs or concerns at this time.

## 2018-09-26 NOTE — ED NOTES
Pt in bed, eyes closed, respirations even and unlabored. No signs of distress noted. Will continue to monitor.
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
## 2018-09-27 ENCOUNTER — HOSPITAL ENCOUNTER (EMERGENCY)
Dept: HOSPITAL 8 - ED | Age: 58
Discharge: HOME | End: 2018-09-27
Payer: MEDICAID

## 2018-09-27 VITALS — HEIGHT: 66 IN | WEIGHT: 198.42 LBS | BODY MASS INDEX: 31.89 KG/M2

## 2018-09-27 VITALS — DIASTOLIC BLOOD PRESSURE: 90 MMHG | SYSTOLIC BLOOD PRESSURE: 154 MMHG

## 2018-09-27 DIAGNOSIS — K21.9: ICD-10-CM

## 2018-09-27 DIAGNOSIS — I10: ICD-10-CM

## 2018-09-27 DIAGNOSIS — F10.120: Primary | ICD-10-CM

## 2018-09-27 PROCEDURE — 99283 EMERGENCY DEPT VISIT LOW MDM: CPT

## 2018-09-28 ENCOUNTER — HOSPITAL ENCOUNTER (EMERGENCY)
Facility: MEDICAL CENTER | Age: 58
End: 2018-09-29
Attending: EMERGENCY MEDICINE
Payer: MEDICAID

## 2018-09-28 DIAGNOSIS — R40.1 STUPOR: ICD-10-CM

## 2018-09-28 DIAGNOSIS — F10.921 ALCOHOL INTOXICATION WITH DELIRIUM (HCC): ICD-10-CM

## 2018-09-28 LAB
ALBUMIN SERPL BCP-MCNC: 3.5 G/DL (ref 3.2–4.9)
ALBUMIN/GLOB SERPL: 1.1 G/DL
ALP SERPL-CCNC: 129 U/L (ref 30–99)
ALT SERPL-CCNC: 13 U/L (ref 2–50)
ANION GAP SERPL CALC-SCNC: 13 MMOL/L (ref 0–11.9)
AST SERPL-CCNC: 32 U/L (ref 12–45)
BILIRUB SERPL-MCNC: 0.2 MG/DL (ref 0.1–1.5)
BUN SERPL-MCNC: 13 MG/DL (ref 8–22)
CALCIUM SERPL-MCNC: 8.1 MG/DL (ref 8.5–10.5)
CHLORIDE SERPL-SCNC: 108 MMOL/L (ref 96–112)
CO2 SERPL-SCNC: 23 MMOL/L (ref 20–33)
CREAT SERPL-MCNC: 1.02 MG/DL (ref 0.5–1.4)
GLOBULIN SER CALC-MCNC: 3.2 G/DL (ref 1.9–3.5)
GLUCOSE SERPL-MCNC: 94 MG/DL (ref 65–99)
LIPASE SERPL-CCNC: 74 U/L (ref 11–82)
POTASSIUM SERPL-SCNC: 3.4 MMOL/L (ref 3.6–5.5)
PROT SERPL-MCNC: 6.7 G/DL (ref 6–8.2)
SODIUM SERPL-SCNC: 144 MMOL/L (ref 135–145)

## 2018-09-28 PROCEDURE — 80053 COMPREHEN METABOLIC PANEL: CPT

## 2018-09-28 PROCEDURE — 700105 HCHG RX REV CODE 258: Performed by: EMERGENCY MEDICINE

## 2018-09-28 PROCEDURE — 700111 HCHG RX REV CODE 636 W/ 250 OVERRIDE (IP): Performed by: EMERGENCY MEDICINE

## 2018-09-28 PROCEDURE — 36415 COLL VENOUS BLD VENIPUNCTURE: CPT

## 2018-09-28 PROCEDURE — 83690 ASSAY OF LIPASE: CPT

## 2018-09-28 PROCEDURE — 96374 THER/PROPH/DIAG INJ IV PUSH: CPT

## 2018-09-28 PROCEDURE — 99285 EMERGENCY DEPT VISIT HI MDM: CPT

## 2018-09-28 RX ORDER — ONDANSETRON 2 MG/ML
4 INJECTION INTRAMUSCULAR; INTRAVENOUS ONCE
Status: COMPLETED | OUTPATIENT
Start: 2018-09-28 | End: 2018-09-28

## 2018-09-28 RX ORDER — SODIUM CHLORIDE 9 MG/ML
1000 INJECTION, SOLUTION INTRAVENOUS ONCE
Status: COMPLETED | OUTPATIENT
Start: 2018-09-28 | End: 2018-09-28

## 2018-09-28 RX ADMIN — SODIUM CHLORIDE 1000 ML: 9 INJECTION, SOLUTION INTRAVENOUS at 16:02

## 2018-09-28 RX ADMIN — ONDANSETRON HYDROCHLORIDE 4 MG: 2 INJECTION INTRAMUSCULAR; INTRAVENOUS at 16:03

## 2018-09-28 ASSESSMENT — PAIN SCALES - GENERAL: PAINLEVEL_OUTOF10: 0

## 2018-09-28 NOTE — ED PROVIDER NOTES
"ED Provider Note    Scribed for Eduardo Eid M.D. by Eddy Shearer. 9/28/2018  3:40 PM    Primary care provider: Pcp Pt States None  Means of arrival: EMS  History obtained from: Nurses note  History limited by: Patient's alcohol intoxication    CHIEF COMPLAINT  Chief Complaint   Patient presents with   • Alcohol Intoxication       HPI  Ashleigh Marquis is a 57 y.o. female who presents to the Emergency Department for alcohol intoxication. Patient was found sleeping on the ground outside and unable to self ambulate.     History limited secondary to patient's alcohol intoxication.    REVIEW OF SYSTEMS  Pertinent positives include alcohol intoxication.    Review of systems limited secondary to patient's alcohol intoxication.    PAST MEDICAL HISTORY   has a past medical history of Alcoholism ([facility]); Anxiety; Arthritis; ASTHMA; Cancer (HCC) (1981); Chronic low back pain; Congestive heart failure ([facility]); Depression; EtOH dependence ([facility]); Fall; GERD (gastroesophageal reflux disease); HTN; Hypertension; Indigestion; Muscle disorder; OSTEOPOROSIS; Other specified symptom associated with female genital organs; Psychiatric disorder; PTSD (post-traumatic stress disorder); Renal disorder; Seizure ([facility]); Ulcer; and Vitamin D deficiency.    SURGICAL HISTORY   has a past surgical history that includes hernia repair (1977); cysto stent placemnt pre surg (10/7/2010); cystoscopy stent placement (11/9/2010); ureteroscopy (11/9/2010); lasertripsy (11/9/2010); and stent removal (11/9/2010).    SOCIAL HISTORY  Social History   Substance Use Topics   • Smoking status: Current Every Day Smoker     Packs/day: 0.50     Years: 20.00     Types: Cigarettes   • Smokeless tobacco: Never Used      Comment: 1/2 pack per day   • Alcohol use Yes      Comment: \"lots of vodka\" currently intoxicated      History   Drug Use No       FAMILY HISTORY  Family History   Problem Relation Age of Onset   • Heart Disease Father    • Hypertension Father  " "  • Diabetes Father    • Cancer Paternal Grandmother    • Depression Other    • Lung Disease Neg Hx    • Stroke Neg Hx        CURRENT MEDICATIONS  Home Medications     Reviewed by Danica Richter R.N. (Registered Nurse) on 09/28/18 at 1523  Med List Status: Unable to Obtain   Medication Last Dose Status   acetaminophen (TYLENOL) 325 MG Tab  Active   benzocaine (ANBESOL) 10 % Gel  Active   doxepin (SINEQUAN) 50 MG Cap unknown Active   fluoxetine (PROZAC) 40 MG capsule unknown Active   ibuprofen (MOTRIN) 800 MG Tab  Active   lisinopril (PRINIVIL) 10 MG Tab unknown Active   loperamide (IMODIUM) 2 MG Cap  Active   lorazepam (ATIVAN) 1 MG Tab unknown Active   potassium chloride SA (KDUR) 20 MEQ Tab CR unknown Active   spironolactone (ALDACTONE) 25 MG Tab unknown Active                ALLERGIES  Allergies   Allergen Reactions   • Sulfa Drugs Vomiting   • Toradol Vomiting   • Neurontin [Gabapentin]      Bowel incontinence     • Amoxicillin Rash     Reported by NAIDA on arrival to ED       PHYSICAL EXAM  VITAL SIGNS: BP (!) 97/88   Pulse (!) 103   Temp 36.6 °C (97.9 °F)   Resp 20   Ht 1.6 m (5' 3\")   Wt 90.7 kg (200 lb)   LMP 11/02/2015   BMI 35.43 kg/m²     Constitutional: No distress, Disheveled, Dry vomit around mouth  HENT: Normocephalic, Atraumatic, Bilateral external ears normal, Dry mucous membranes, No oral exudates.   Eyes: Eyes forward and reactive, EOMI, Conjunctiva normal, No discharge.   Neck: No tenderness, Supple, No stridor.   Lymphatic: No lymphadenopathy noted.   Cardiovascular: Normal heart rate, Normal rhythm.   Thorax & Lungs: Clear to auscultation bilaterally, No respiratory distress, No wheezing, No crackles.   Abdomen: Soft, No tenderness, No masses, No pulsatile masses.   Skin: Warm, Dry, No erythema, No rash.   Extremities:, No edema No cyanosis.   Musculoskeletal: No tenderness to palpation or major deformities noted.  Intact distal pulses. No evidence of trauma  Neurologic:  Obtunded, " will not respond to vigorous sternal rub  Psychiatric: Unable to assess          LABS  Labs Reviewed   COMP METABOLIC PANEL - Abnormal; Notable for the following:        Result Value    Potassium 3.4 (*)     Anion Gap 13.0 (*)     Calcium 8.1 (*)     Alkaline Phosphatase 129 (*)     All other components within normal limits   ESTIMATED GFR - Abnormal; Notable for the following:     GFR If Non  56 (*)     All other components within normal limits   LIPASE     All labs reviewed by me.      COURSE & MEDICAL DECISION MAKING  Pertinent Labs & Imaging studies reviewed. (See chart for details)    I reviewed the patient's medical records which showed that she was seen here 2 days ago for alcohol intoxication.     3:40 PM - Patient seen and examined at bedside. Will re-evaluate once she wakes. Patient will be treated with Zofran 4 mg IV and IV fluids for dry mucous membranes. Ordered CMP, eGFR, and lipase to evaluate her symptoms.    5:15 PM Patient still asleep and intoxicated.     IV fluid response with improvement of heart rate    Decision Making:  Patient is obtunded, likely from alcohol intoxication, the patient has been here multiple times secondary to alcohol intoxication.  Due to the patient's tachycardia give the patient IV fluids, electrolytes were unremarkable, we will monitor the patient here until she is stable on her free and sober, then the patient will be discharged home.    HYDRATION: Based on the patient's presentation of Dehydration the patient was given IV fluids.  Upon recheck following hydration, the patient was Improved.     DISPOSITION:  To home once the patient is clinically sober    FINAL IMPRESSION  1. Stupor    2. Alcohol intoxication with delirium (HCC)          dEdy GAN (Cayetano), am scribing for, and in the presence of, Eduardo Eid M.D..    Electronically signed by: Eddy Martins), 9/28/2018    Eduardo GAN M.D. personally performed the services  described in this documentation, as scribed by Eddy Shearer in my presence, and it is both accurate and complete. C.    The note accurately reflects work and decisions made by me.  Eduardo Eid  9/28/2018  8:18 PM

## 2018-09-28 NOTE — ED TRIAGE NOTES
Patient arrived via EMS in wheelchair. She was found sleeping on ground, could not ambulate without assistance and was brought to ED. No complaints except alcohol intoxication. Pt has present to ED multiple times for same complaint. Wakes to voice .

## 2018-09-29 ENCOUNTER — HOSPITAL ENCOUNTER (EMERGENCY)
Facility: MEDICAL CENTER | Age: 58
End: 2018-09-30
Attending: EMERGENCY MEDICINE
Payer: MEDICAID

## 2018-09-29 VITALS
BODY MASS INDEX: 35.44 KG/M2 | OXYGEN SATURATION: 94 % | HEART RATE: 95 BPM | DIASTOLIC BLOOD PRESSURE: 88 MMHG | HEIGHT: 63 IN | TEMPERATURE: 97.9 F | SYSTOLIC BLOOD PRESSURE: 97 MMHG | WEIGHT: 200 LBS | RESPIRATION RATE: 18 BRPM

## 2018-09-29 DIAGNOSIS — F10.920 ALCOHOLIC INTOXICATION WITHOUT COMPLICATION (HCC): ICD-10-CM

## 2018-09-29 DIAGNOSIS — S30.1XXA CONTUSION OF ABDOMINAL WALL, INITIAL ENCOUNTER: ICD-10-CM

## 2018-09-29 PROCEDURE — 99285 EMERGENCY DEPT VISIT HI MDM: CPT

## 2018-09-29 ASSESSMENT — PAIN SCALES - GENERAL: PAINLEVEL_OUTOF10: 7

## 2018-09-29 NOTE — ED NOTES
"No changes. Rn attempted to speak to pt about DC plan. Pt states \"i dont feel like it\" and goes back to sleeping.   "

## 2018-09-29 NOTE — ED PROVIDER NOTES
"ED Provider Note    57 y.o.   Female initially presented with alcohol intoxication.  Reevaluated the patient at bedside.  She is well-known to this emergency department with frequent visits with alcohol intoxication.  No signs of active withdrawal.  No tremulousness.  No vomiting.  Steady gait.  Appears stable for discharge.  Patient states that she has nowhere to go and will await for morning.    Patient was discharged home in stable condition.  No signs of active withdrawal.    BP (!) 97/88   Pulse 95   Temp 36.6 °C (97.9 °F)   Resp 18   Ht 1.6 m (5' 3\")   Wt 90.7 kg (200 lb)   LMP 11/02/2015   SpO2 94%   BMI 35.43 kg/m²       [facility], Emergency Dept  [address] [address] [phone]    If symptoms worsen    1. Stupor    2. Alcohol intoxication with delirium (HCC)      "

## 2018-09-29 NOTE — ED NOTES
Pt understands discharge instructions, follow up if needed. Out to lobby with well balanced gait.

## 2018-09-30 ENCOUNTER — HOSPITAL ENCOUNTER (EMERGENCY)
Dept: HOSPITAL 8 - ED | Age: 58
Discharge: HOME | End: 2018-09-30
Payer: MEDICAID

## 2018-09-30 VITALS
SYSTOLIC BLOOD PRESSURE: 138 MMHG | HEART RATE: 78 BPM | RESPIRATION RATE: 16 BRPM | WEIGHT: 200 LBS | TEMPERATURE: 98.6 F | HEIGHT: 65 IN | DIASTOLIC BLOOD PRESSURE: 68 MMHG | BODY MASS INDEX: 33.32 KG/M2 | OXYGEN SATURATION: 98 %

## 2018-09-30 VITALS — WEIGHT: 178.57 LBS | HEIGHT: 65 IN | BODY MASS INDEX: 29.75 KG/M2

## 2018-09-30 VITALS — SYSTOLIC BLOOD PRESSURE: 146 MMHG | DIASTOLIC BLOOD PRESSURE: 86 MMHG

## 2018-09-30 DIAGNOSIS — I10: ICD-10-CM

## 2018-09-30 DIAGNOSIS — K21.9: ICD-10-CM

## 2018-09-30 DIAGNOSIS — F10.120: Primary | ICD-10-CM

## 2018-09-30 DIAGNOSIS — F32.9: ICD-10-CM

## 2018-09-30 PROCEDURE — 99283 EMERGENCY DEPT VISIT LOW MDM: CPT
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
## 2018-09-30 NOTE — ED NOTES
Pt ambulatory to bathroom with steady gait.  Pt ambulatory back to exam room.  Pt will be discharged to home.

## 2018-10-02 ENCOUNTER — APPOINTMENT (OUTPATIENT)
Dept: RADIOLOGY | Facility: MEDICAL CENTER | Age: 58
DRG: 377 | End: 2018-10-02
Attending: EMERGENCY MEDICINE
Payer: MEDICAID

## 2018-10-02 ENCOUNTER — HOSPITAL ENCOUNTER (INPATIENT)
Facility: MEDICAL CENTER | Age: 58
LOS: 3 days | DRG: 377 | End: 2018-10-05
Attending: EMERGENCY MEDICINE | Admitting: INTERNAL MEDICINE
Payer: MEDICAID

## 2018-10-02 DIAGNOSIS — K92.2 GASTROINTESTINAL HEMORRHAGE, UNSPECIFIED GASTROINTESTINAL HEMORRHAGE TYPE: ICD-10-CM

## 2018-10-02 DIAGNOSIS — K92.0 HEMATEMESIS WITH NAUSEA: ICD-10-CM

## 2018-10-02 DIAGNOSIS — N17.9 AKI (ACUTE KIDNEY INJURY) (HCC): ICD-10-CM

## 2018-10-02 DIAGNOSIS — I95.9 HYPOTENSION, UNSPECIFIED HYPOTENSION TYPE: ICD-10-CM

## 2018-10-02 DIAGNOSIS — S30.1XXA CONTUSION OF ABDOMINAL WALL, INITIAL ENCOUNTER: ICD-10-CM

## 2018-10-02 LAB
ABO GROUP BLD: NORMAL
ALBUMIN SERPL BCP-MCNC: 3.7 G/DL (ref 3.2–4.9)
ALBUMIN/GLOB SERPL: 1.1 G/DL
ALP SERPL-CCNC: 128 U/L (ref 30–99)
ALT SERPL-CCNC: 14 U/L (ref 2–50)
ANION GAP SERPL CALC-SCNC: 15 MMOL/L (ref 0–11.9)
AST SERPL-CCNC: 31 U/L (ref 12–45)
BARCODED ABORH UBTYP: 5100
BARCODED PRD CODE UBPRD: NORMAL
BARCODED UNIT NUM UBUNT: NORMAL
BASOPHILS # BLD AUTO: 0.7 % (ref 0–1.8)
BASOPHILS # BLD: 0.08 K/UL (ref 0–0.12)
BILIRUB SERPL-MCNC: 0.5 MG/DL (ref 0.1–1.5)
BLD GP AB SCN SERPL QL: NORMAL
BUN SERPL-MCNC: 19 MG/DL (ref 8–22)
CALCIUM SERPL-MCNC: 9.4 MG/DL (ref 8.5–10.5)
CHLORIDE SERPL-SCNC: 102 MMOL/L (ref 96–112)
CO2 SERPL-SCNC: 25 MMOL/L (ref 20–33)
COMPONENT R 8504R: NORMAL
CREAT SERPL-MCNC: 1.8 MG/DL (ref 0.5–1.4)
EOSINOPHIL # BLD AUTO: 0.01 K/UL (ref 0–0.51)
EOSINOPHIL NFR BLD: 0.1 % (ref 0–6.9)
ERYTHROCYTE [DISTWIDTH] IN BLOOD BY AUTOMATED COUNT: 58.3 FL (ref 35.9–50)
ETHANOL BLD-MCNC: 0.1 G/DL
GLOBULIN SER CALC-MCNC: 3.4 G/DL (ref 1.9–3.5)
GLUCOSE SERPL-MCNC: 82 MG/DL (ref 65–99)
HCT VFR BLD AUTO: 39.4 % (ref 37–47)
HGB BLD-MCNC: 12.2 G/DL (ref 12–16)
IMM GRANULOCYTES # BLD AUTO: 0.04 K/UL (ref 0–0.11)
IMM GRANULOCYTES NFR BLD AUTO: 0.3 % (ref 0–0.9)
INR PPP: 1 (ref 0.87–1.13)
LIPASE SERPL-CCNC: 56 U/L (ref 11–82)
LYMPHOCYTES # BLD AUTO: 0.91 K/UL (ref 1–4.8)
LYMPHOCYTES NFR BLD: 7.5 % (ref 22–41)
MCH RBC QN AUTO: 28.7 PG (ref 27–33)
MCHC RBC AUTO-ENTMCNC: 31 G/DL (ref 33.6–35)
MCV RBC AUTO: 92.7 FL (ref 81.4–97.8)
MONOCYTES # BLD AUTO: 0.87 K/UL (ref 0–0.85)
MONOCYTES NFR BLD AUTO: 7.2 % (ref 0–13.4)
NEUTROPHILS # BLD AUTO: 10.19 K/UL (ref 2–7.15)
NEUTROPHILS NFR BLD: 84.2 % (ref 44–72)
NRBC # BLD AUTO: 0 K/UL
NRBC BLD-RTO: 0 /100 WBC
PLATELET # BLD AUTO: 231 K/UL (ref 164–446)
PMV BLD AUTO: 10.1 FL (ref 9–12.9)
POTASSIUM SERPL-SCNC: 3.6 MMOL/L (ref 3.6–5.5)
PRODUCT TYPE UPROD: NORMAL
PROT SERPL-MCNC: 7.1 G/DL (ref 6–8.2)
PROTHROMBIN TIME: 13.3 SEC (ref 12–14.6)
RBC # BLD AUTO: 4.25 M/UL (ref 4.2–5.4)
RH BLD: NORMAL
SODIUM SERPL-SCNC: 142 MMOL/L (ref 135–145)
TROPONIN I SERPL-MCNC: 0.02 NG/ML (ref 0–0.04)
UNIT STATUS USTAT: NORMAL
WBC # BLD AUTO: 12.1 K/UL (ref 4.8–10.8)

## 2018-10-02 PROCEDURE — 99291 CRITICAL CARE FIRST HOUR: CPT

## 2018-10-02 PROCEDURE — 85025 COMPLETE CBC W/AUTO DIFF WBC: CPT

## 2018-10-02 PROCEDURE — C1751 CATH, INF, PER/CENT/MIDLINE: HCPCS

## 2018-10-02 PROCEDURE — 99223 1ST HOSP IP/OBS HIGH 75: CPT | Performed by: INTERNAL MEDICINE

## 2018-10-02 PROCEDURE — 70450 CT HEAD/BRAIN W/O DYE: CPT

## 2018-10-02 PROCEDURE — P9016 RBC LEUKOCYTES REDUCED: HCPCS

## 2018-10-02 PROCEDURE — 86900 BLOOD TYPING SEROLOGIC ABO: CPT

## 2018-10-02 PROCEDURE — 86850 RBC ANTIBODY SCREEN: CPT

## 2018-10-02 PROCEDURE — C1751 CATH, INF, PER/CENT/MIDLINE: HCPCS | Performed by: EMERGENCY MEDICINE

## 2018-10-02 PROCEDURE — 96366 THER/PROPH/DIAG IV INF ADDON: CPT

## 2018-10-02 PROCEDURE — 84100 ASSAY OF PHOSPHORUS: CPT

## 2018-10-02 PROCEDURE — 80307 DRUG TEST PRSMV CHEM ANLYZR: CPT

## 2018-10-02 PROCEDURE — 700105 HCHG RX REV CODE 258: Performed by: EMERGENCY MEDICINE

## 2018-10-02 PROCEDURE — 700105 HCHG RX REV CODE 258

## 2018-10-02 PROCEDURE — 72128 CT CHEST SPINE W/O DYE: CPT

## 2018-10-02 PROCEDURE — HZ2ZZZZ DETOXIFICATION SERVICES FOR SUBSTANCE ABUSE TREATMENT: ICD-10-PCS | Performed by: INTERNAL MEDICINE

## 2018-10-02 PROCEDURE — 36430 TRANSFUSION BLD/BLD COMPNT: CPT

## 2018-10-02 PROCEDURE — 36556 INSERT NON-TUNNEL CV CATH: CPT

## 2018-10-02 PROCEDURE — 700111 HCHG RX REV CODE 636 W/ 250 OVERRIDE (IP)

## 2018-10-02 PROCEDURE — C9113 INJ PANTOPRAZOLE SODIUM, VIA: HCPCS | Performed by: EMERGENCY MEDICINE

## 2018-10-02 PROCEDURE — 96368 THER/DIAG CONCURRENT INF: CPT

## 2018-10-02 PROCEDURE — 72131 CT LUMBAR SPINE W/O DYE: CPT

## 2018-10-02 PROCEDURE — 02HV33Z INSERTION OF INFUSION DEVICE INTO SUPERIOR VENA CAVA, PERCUTANEOUS APPROACH: ICD-10-PCS | Performed by: EMERGENCY MEDICINE

## 2018-10-02 PROCEDURE — 700111 HCHG RX REV CODE 636 W/ 250 OVERRIDE (IP): Performed by: EMERGENCY MEDICINE

## 2018-10-02 PROCEDURE — 71260 CT THORAX DX C+: CPT

## 2018-10-02 PROCEDURE — 96365 THER/PROPH/DIAG IV INF INIT: CPT

## 2018-10-02 PROCEDURE — 72125 CT NECK SPINE W/O DYE: CPT

## 2018-10-02 PROCEDURE — 96367 TX/PROPH/DG ADDL SEQ IV INF: CPT

## 2018-10-02 PROCEDURE — 770022 HCHG ROOM/CARE - ICU (200)

## 2018-10-02 PROCEDURE — 85610 PROTHROMBIN TIME: CPT

## 2018-10-02 PROCEDURE — 86923 COMPATIBILITY TEST ELECTRIC: CPT

## 2018-10-02 PROCEDURE — 700117 HCHG RX CONTRAST REV CODE 255: Performed by: EMERGENCY MEDICINE

## 2018-10-02 PROCEDURE — 30233N1 TRANSFUSION OF NONAUTOLOGOUS RED BLOOD CELLS INTO PERIPHERAL VEIN, PERCUTANEOUS APPROACH: ICD-10-PCS | Performed by: EMERGENCY MEDICINE

## 2018-10-02 PROCEDURE — 83735 ASSAY OF MAGNESIUM: CPT

## 2018-10-02 PROCEDURE — 80053 COMPREHEN METABOLIC PANEL: CPT

## 2018-10-02 PROCEDURE — 83690 ASSAY OF LIPASE: CPT

## 2018-10-02 PROCEDURE — 96376 TX/PRO/DX INJ SAME DRUG ADON: CPT

## 2018-10-02 PROCEDURE — 84484 ASSAY OF TROPONIN QUANT: CPT

## 2018-10-02 PROCEDURE — 86901 BLOOD TYPING SEROLOGIC RH(D): CPT

## 2018-10-02 PROCEDURE — 96375 TX/PRO/DX INJ NEW DRUG ADDON: CPT

## 2018-10-02 PROCEDURE — 71045 X-RAY EXAM CHEST 1 VIEW: CPT

## 2018-10-02 RX ORDER — LORAZEPAM 2 MG/ML
2 INJECTION INTRAMUSCULAR
Status: DISCONTINUED | OUTPATIENT
Start: 2018-10-02 | End: 2018-10-04

## 2018-10-02 RX ORDER — MORPHINE SULFATE 4 MG/ML
4 INJECTION, SOLUTION INTRAMUSCULAR; INTRAVENOUS
Status: DISCONTINUED | OUTPATIENT
Start: 2018-10-03 | End: 2018-10-03

## 2018-10-02 RX ORDER — ONDANSETRON 2 MG/ML
4 INJECTION INTRAMUSCULAR; INTRAVENOUS EVERY 4 HOURS PRN
Status: DISCONTINUED | OUTPATIENT
Start: 2018-10-02 | End: 2018-10-05 | Stop reason: HOSPADM

## 2018-10-02 RX ORDER — LORAZEPAM 1 MG/1
3 TABLET ORAL
Status: DISCONTINUED | OUTPATIENT
Start: 2018-10-02 | End: 2018-10-04

## 2018-10-02 RX ORDER — HALOPERIDOL 5 MG/ML
5 INJECTION INTRAMUSCULAR ONCE
Status: COMPLETED | OUTPATIENT
Start: 2018-10-02 | End: 2018-10-02

## 2018-10-02 RX ORDER — LORAZEPAM 1 MG/1
2 TABLET ORAL
Status: DISCONTINUED | OUTPATIENT
Start: 2018-10-02 | End: 2018-10-04

## 2018-10-02 RX ORDER — OCTREOTIDE ACETATE 100 UG/ML
50 INJECTION, SOLUTION INTRAVENOUS; SUBCUTANEOUS ONCE
Status: COMPLETED | OUTPATIENT
Start: 2018-10-02 | End: 2018-10-02

## 2018-10-02 RX ORDER — MORPHINE SULFATE 4 MG/ML
4 INJECTION, SOLUTION INTRAMUSCULAR; INTRAVENOUS
Status: DISCONTINUED | OUTPATIENT
Start: 2018-10-02 | End: 2018-10-02

## 2018-10-02 RX ORDER — MORPHINE SULFATE 4 MG/ML
4 INJECTION, SOLUTION INTRAMUSCULAR; INTRAVENOUS ONCE
Status: COMPLETED | OUTPATIENT
Start: 2018-10-02 | End: 2018-10-02

## 2018-10-02 RX ORDER — SODIUM CHLORIDE, SODIUM LACTATE, POTASSIUM CHLORIDE, CALCIUM CHLORIDE 600; 310; 30; 20 MG/100ML; MG/100ML; MG/100ML; MG/100ML
INJECTION, SOLUTION INTRAVENOUS CONTINUOUS
Status: DISCONTINUED | OUTPATIENT
Start: 2018-10-03 | End: 2018-10-04

## 2018-10-02 RX ORDER — ONDANSETRON 4 MG/1
4 TABLET, ORALLY DISINTEGRATING ORAL EVERY 4 HOURS PRN
Status: DISCONTINUED | OUTPATIENT
Start: 2018-10-02 | End: 2018-10-05 | Stop reason: HOSPADM

## 2018-10-02 RX ORDER — LORAZEPAM 1 MG/1
1 TABLET ORAL EVERY 4 HOURS PRN
Status: DISCONTINUED | OUTPATIENT
Start: 2018-10-02 | End: 2018-10-04

## 2018-10-02 RX ORDER — LORAZEPAM 2 MG/ML
1.5 INJECTION INTRAMUSCULAR
Status: DISCONTINUED | OUTPATIENT
Start: 2018-10-02 | End: 2018-10-04

## 2018-10-02 RX ORDER — LORAZEPAM 2 MG/ML
1 INJECTION INTRAMUSCULAR
Status: DISCONTINUED | OUTPATIENT
Start: 2018-10-02 | End: 2018-10-04

## 2018-10-02 RX ORDER — PROMETHAZINE HYDROCHLORIDE 25 MG/1
12.5-25 SUPPOSITORY RECTAL EVERY 4 HOURS PRN
Status: DISCONTINUED | OUTPATIENT
Start: 2018-10-02 | End: 2018-10-05 | Stop reason: HOSPADM

## 2018-10-02 RX ORDER — SODIUM CHLORIDE 9 MG/ML
1000 INJECTION, SOLUTION INTRAVENOUS ONCE
Status: COMPLETED | OUTPATIENT
Start: 2018-10-02 | End: 2018-10-02

## 2018-10-02 RX ORDER — HALOPERIDOL 5 MG/ML
INJECTION INTRAMUSCULAR
Status: COMPLETED
Start: 2018-10-02 | End: 2018-10-02

## 2018-10-02 RX ORDER — LORAZEPAM 2 MG/ML
0.5 INJECTION INTRAMUSCULAR EVERY 4 HOURS PRN
Status: DISCONTINUED | OUTPATIENT
Start: 2018-10-02 | End: 2018-10-04

## 2018-10-02 RX ORDER — LORAZEPAM 1 MG/1
4 TABLET ORAL
Status: DISCONTINUED | OUTPATIENT
Start: 2018-10-02 | End: 2018-10-04

## 2018-10-02 RX ORDER — PROMETHAZINE HYDROCHLORIDE 25 MG/1
12.5-25 TABLET ORAL EVERY 4 HOURS PRN
Status: DISCONTINUED | OUTPATIENT
Start: 2018-10-02 | End: 2018-10-05 | Stop reason: HOSPADM

## 2018-10-02 RX ORDER — LORAZEPAM 1 MG/1
0.5 TABLET ORAL EVERY 4 HOURS PRN
Status: DISCONTINUED | OUTPATIENT
Start: 2018-10-02 | End: 2018-10-04

## 2018-10-02 RX ADMIN — SODIUM CHLORIDE 8 MG/HR: 9 INJECTION, SOLUTION INTRAVENOUS at 20:59

## 2018-10-02 RX ADMIN — MORPHINE SULFATE 4 MG: 4 INJECTION INTRAVENOUS at 20:46

## 2018-10-02 RX ADMIN — SODIUM CHLORIDE 1000 ML: 9 INJECTION, SOLUTION INTRAVENOUS at 19:18

## 2018-10-02 RX ADMIN — CEFTRIAXONE SODIUM 1 G: 1 INJECTION, POWDER, FOR SOLUTION INTRAMUSCULAR; INTRAVENOUS at 20:46

## 2018-10-02 RX ADMIN — HALOPERIDOL 5 MG: 5 INJECTION INTRAMUSCULAR at 20:45

## 2018-10-02 RX ADMIN — OCTREOTIDE ACETATE 50 MCG/HR: 200 INJECTION, SOLUTION INTRAVENOUS; SUBCUTANEOUS at 20:57

## 2018-10-02 RX ADMIN — SODIUM CHLORIDE 80 MG: 9 INJECTION, SOLUTION INTRAVENOUS at 20:51

## 2018-10-02 RX ADMIN — HALOPERIDOL LACTATE 5 MG: 5 INJECTION, SOLUTION INTRAMUSCULAR at 20:45

## 2018-10-02 RX ADMIN — IOHEXOL 100 ML: 350 INJECTION, SOLUTION INTRAVENOUS at 21:44

## 2018-10-02 RX ADMIN — OCTREOTIDE ACETATE 50 MCG: 100 INJECTION, SOLUTION INTRAVENOUS; SUBCUTANEOUS at 20:51

## 2018-10-02 RX ADMIN — MORPHINE SULFATE 4 MG: 4 INJECTION INTRAVENOUS at 22:02

## 2018-10-02 ASSESSMENT — PAIN SCALES - GENERAL
PAINLEVEL_OUTOF10: 0
PAINLEVEL_OUTOF10: 3

## 2018-10-03 PROBLEM — D62 ANEMIA ASSOCIATED WITH ACUTE BLOOD LOSS: Status: ACTIVE | Noted: 2018-10-03

## 2018-10-03 PROBLEM — G62.9 NEUROPATHY: Status: ACTIVE | Noted: 2018-10-03

## 2018-10-03 LAB
AMMONIA PLAS-SCNC: 35 UMOL/L (ref 11–45)
ANION GAP SERPL CALC-SCNC: 11 MMOL/L (ref 0–11.9)
BUN SERPL-MCNC: 19 MG/DL (ref 8–22)
CALCIUM SERPL-MCNC: 8.7 MG/DL (ref 8.5–10.5)
CHLORIDE SERPL-SCNC: 103 MMOL/L (ref 96–112)
CO2 SERPL-SCNC: 25 MMOL/L (ref 20–33)
CREAT SERPL-MCNC: 1.56 MG/DL (ref 0.5–1.4)
EKG IMPRESSION: NORMAL
FOLATE SERPL-MCNC: >24 NG/ML
GLUCOSE SERPL-MCNC: 84 MG/DL (ref 65–99)
HGB BLD-MCNC: 11.6 G/DL (ref 12–16)
HGB BLD-MCNC: 11.7 G/DL (ref 12–16)
HGB BLD-MCNC: 11.8 G/DL (ref 12–16)
IRON SATN MFR SERPL: 31 % (ref 15–55)
IRON SERPL-MCNC: 120 UG/DL (ref 40–170)
MAGNESIUM SERPL-MCNC: 1.4 MG/DL (ref 1.5–2.5)
PHOSPHATE SERPL-MCNC: 3.5 MG/DL (ref 2.5–4.5)
POTASSIUM SERPL-SCNC: 3.6 MMOL/L (ref 3.6–5.5)
SODIUM SERPL-SCNC: 139 MMOL/L (ref 135–145)
TIBC SERPL-MCNC: 391 UG/DL (ref 250–450)
VIT B12 SERPL-MCNC: 300 PG/ML (ref 211–911)

## 2018-10-03 PROCEDURE — 0DB78ZX EXCISION OF STOMACH, PYLORUS, VIA NATURAL OR ARTIFICIAL OPENING ENDOSCOPIC, DIAGNOSTIC: ICD-10-PCS | Performed by: INTERNAL MEDICINE

## 2018-10-03 PROCEDURE — 82746 ASSAY OF FOLIC ACID SERUM: CPT

## 2018-10-03 PROCEDURE — 700111 HCHG RX REV CODE 636 W/ 250 OVERRIDE (IP): Performed by: HOSPITALIST

## 2018-10-03 PROCEDURE — 700105 HCHG RX REV CODE 258: Performed by: INTERNAL MEDICINE

## 2018-10-03 PROCEDURE — 85018 HEMOGLOBIN: CPT

## 2018-10-03 PROCEDURE — 700101 HCHG RX REV CODE 250: Performed by: INTERNAL MEDICINE

## 2018-10-03 PROCEDURE — C9113 INJ PANTOPRAZOLE SODIUM, VIA: HCPCS | Performed by: INTERNAL MEDICINE

## 2018-10-03 PROCEDURE — 770020 HCHG ROOM/CARE - TELE (206)

## 2018-10-03 PROCEDURE — 700111 HCHG RX REV CODE 636 W/ 250 OVERRIDE (IP): Performed by: INTERNAL MEDICINE

## 2018-10-03 PROCEDURE — 93005 ELECTROCARDIOGRAM TRACING: CPT | Performed by: INTERNAL MEDICINE

## 2018-10-03 PROCEDURE — 82140 ASSAY OF AMMONIA: CPT

## 2018-10-03 PROCEDURE — 83540 ASSAY OF IRON: CPT

## 2018-10-03 PROCEDURE — 500066 HCHG BITE BLOCK, ECT: Performed by: INTERNAL MEDICINE

## 2018-10-03 PROCEDURE — A9270 NON-COVERED ITEM OR SERVICE: HCPCS | Performed by: HOSPITALIST

## 2018-10-03 PROCEDURE — 99153 MOD SED SAME PHYS/QHP EA: CPT | Performed by: INTERNAL MEDICINE

## 2018-10-03 PROCEDURE — 160203 HCHG ENDO MINUTES - 1ST 30 MINS LEVEL 4: Performed by: INTERNAL MEDICINE

## 2018-10-03 PROCEDURE — 99233 SBSQ HOSP IP/OBS HIGH 50: CPT | Performed by: HOSPITALIST

## 2018-10-03 PROCEDURE — 700105 HCHG RX REV CODE 258: Performed by: HOSPITALIST

## 2018-10-03 PROCEDURE — 99152 MOD SED SAME PHYS/QHP 5/>YRS: CPT | Performed by: INTERNAL MEDICINE

## 2018-10-03 PROCEDURE — 80048 BASIC METABOLIC PNL TOTAL CA: CPT

## 2018-10-03 PROCEDURE — 700111 HCHG RX REV CODE 636 W/ 250 OVERRIDE (IP)

## 2018-10-03 PROCEDURE — 88305 TISSUE EXAM BY PATHOLOGIST: CPT

## 2018-10-03 PROCEDURE — 82607 VITAMIN B-12: CPT

## 2018-10-03 PROCEDURE — 160048 HCHG OR STATISTICAL LEVEL 1-5: Performed by: INTERNAL MEDICINE

## 2018-10-03 PROCEDURE — 93010 ELECTROCARDIOGRAM REPORT: CPT | Performed by: INTERNAL MEDICINE

## 2018-10-03 PROCEDURE — 88312 SPECIAL STAINS GROUP 1: CPT

## 2018-10-03 PROCEDURE — A9270 NON-COVERED ITEM OR SERVICE: HCPCS | Performed by: INTERNAL MEDICINE

## 2018-10-03 PROCEDURE — 83550 IRON BINDING TEST: CPT

## 2018-10-03 PROCEDURE — 700102 HCHG RX REV CODE 250 W/ 637 OVERRIDE(OP): Performed by: HOSPITALIST

## 2018-10-03 PROCEDURE — 160208 HCHG ENDO MINUTES - EA ADDL 1 MIN LEVEL 4: Performed by: INTERNAL MEDICINE

## 2018-10-03 PROCEDURE — 700102 HCHG RX REV CODE 250 W/ 637 OVERRIDE(OP): Performed by: INTERNAL MEDICINE

## 2018-10-03 RX ORDER — ACETAMINOPHEN 325 MG/1
650 TABLET ORAL EVERY 4 HOURS PRN
Status: DISCONTINUED | OUTPATIENT
Start: 2018-10-03 | End: 2018-10-05 | Stop reason: HOSPADM

## 2018-10-03 RX ORDER — PREGABALIN 25 MG/1
25 CAPSULE ORAL 3 TIMES DAILY
Status: DISCONTINUED | OUTPATIENT
Start: 2018-10-03 | End: 2018-10-04

## 2018-10-03 RX ORDER — SODIUM CHLORIDE 9 MG/ML
500 INJECTION, SOLUTION INTRAVENOUS
Status: ACTIVE | OUTPATIENT
Start: 2018-10-03 | End: 2018-10-03

## 2018-10-03 RX ORDER — MIDAZOLAM HYDROCHLORIDE 1 MG/ML
.5-2 INJECTION INTRAMUSCULAR; INTRAVENOUS PRN
Status: ACTIVE | OUTPATIENT
Start: 2018-10-03 | End: 2018-10-03

## 2018-10-03 RX ORDER — MIDAZOLAM HYDROCHLORIDE 1 MG/ML
INJECTION INTRAMUSCULAR; INTRAVENOUS
Status: DISCONTINUED | OUTPATIENT
Start: 2018-10-03 | End: 2018-10-03 | Stop reason: HOSPADM

## 2018-10-03 RX ORDER — POTASSIUM CHLORIDE 7.45 MG/ML
10 INJECTION INTRAVENOUS ONCE
Status: COMPLETED | OUTPATIENT
Start: 2018-10-03 | End: 2018-10-03

## 2018-10-03 RX ORDER — MAGNESIUM SULFATE HEPTAHYDRATE 40 MG/ML
4 INJECTION, SOLUTION INTRAVENOUS ONCE
Status: COMPLETED | OUTPATIENT
Start: 2018-10-03 | End: 2018-10-03

## 2018-10-03 RX ORDER — MORPHINE SULFATE 4 MG/ML
2-4 INJECTION, SOLUTION INTRAMUSCULAR; INTRAVENOUS
Status: DISCONTINUED | OUTPATIENT
Start: 2018-10-03 | End: 2018-10-05

## 2018-10-03 RX ORDER — OMEPRAZOLE 20 MG/1
40 CAPSULE, DELAYED RELEASE ORAL 2 TIMES DAILY
Status: DISCONTINUED | OUTPATIENT
Start: 2018-10-03 | End: 2018-10-05 | Stop reason: HOSPADM

## 2018-10-03 RX ORDER — SUCRALFATE ORAL 1 G/10ML
1 SUSPENSION ORAL EVERY 6 HOURS
Status: DISCONTINUED | OUTPATIENT
Start: 2018-10-03 | End: 2018-10-05 | Stop reason: HOSPADM

## 2018-10-03 RX ORDER — ALBUTEROL SULFATE 90 UG/1
2 AEROSOL, METERED RESPIRATORY (INHALATION) EVERY 4 HOURS PRN
Status: DISCONTINUED | OUTPATIENT
Start: 2018-10-03 | End: 2018-10-05 | Stop reason: HOSPADM

## 2018-10-03 RX ADMIN — SODIUM CHLORIDE, POTASSIUM CHLORIDE, SODIUM LACTATE AND CALCIUM CHLORIDE: 600; 310; 30; 20 INJECTION, SOLUTION INTRAVENOUS at 14:00

## 2018-10-03 RX ADMIN — PREGABALIN 25 MG: 25 CAPSULE ORAL at 17:05

## 2018-10-03 RX ADMIN — MORPHINE SULFATE 2 MG: 4 INJECTION INTRAVENOUS at 18:46

## 2018-10-03 RX ADMIN — POTASSIUM CHLORIDE 10 MEQ: 7.46 INJECTION, SOLUTION INTRAVENOUS at 03:36

## 2018-10-03 RX ADMIN — MORPHINE SULFATE 2 MG: 4 INJECTION INTRAVENOUS at 21:39

## 2018-10-03 RX ADMIN — THIAMINE HYDROCHLORIDE 100 MG: 100 INJECTION, SOLUTION INTRAMUSCULAR; INTRAVENOUS at 05:51

## 2018-10-03 RX ADMIN — OMEPRAZOLE 40 MG: 20 CAPSULE, DELAYED RELEASE ORAL at 17:05

## 2018-10-03 RX ADMIN — SODIUM CHLORIDE 8 MG/HR: 9 INJECTION, SOLUTION INTRAVENOUS at 11:00

## 2018-10-03 RX ADMIN — MORPHINE SULFATE 4 MG: 4 INJECTION INTRAVENOUS at 08:40

## 2018-10-03 RX ADMIN — MORPHINE SULFATE 4 MG: 4 INJECTION INTRAVENOUS at 02:06

## 2018-10-03 RX ADMIN — SODIUM CHLORIDE, POTASSIUM CHLORIDE, SODIUM LACTATE AND CALCIUM CHLORIDE: 600; 310; 30; 20 INJECTION, SOLUTION INTRAVENOUS at 01:31

## 2018-10-03 RX ADMIN — MAGNESIUM SULFATE HEPTAHYDRATE 4 G: 40 INJECTION, SOLUTION INTRAVENOUS at 03:32

## 2018-10-03 RX ADMIN — SUCRALFATE 1 G: 1 SUSPENSION ORAL at 17:05

## 2018-10-03 RX ADMIN — FOLIC ACID 1 MG: 5 INJECTION, SOLUTION INTRAMUSCULAR; INTRAVENOUS; SUBCUTANEOUS at 06:21

## 2018-10-03 RX ADMIN — SUCRALFATE 1 G: 1 SUSPENSION ORAL at 11:52

## 2018-10-03 RX ADMIN — SODIUM CHLORIDE 8 MG/HR: 9 INJECTION, SOLUTION INTRAVENOUS at 01:29

## 2018-10-03 RX ADMIN — OCTREOTIDE ACETATE 50 MCG/HR: 200 INJECTION, SOLUTION INTRAVENOUS; SUBCUTANEOUS at 01:30

## 2018-10-03 ASSESSMENT — ENCOUNTER SYMPTOMS
COUGH: 0
MEMORY LOSS: 0
PALPITATIONS: 0
SPEECH CHANGE: 0
NERVOUS/ANXIOUS: 1
VOMITING: 1
WHEEZING: 0
DIARRHEA: 0
FALLS: 0
FLANK PAIN: 0
STRIDOR: 0
ABDOMINAL PAIN: 1
CHILLS: 0
VOMITING: 0
BLURRED VISION: 0
BRUISES/BLEEDS EASILY: 0
ORTHOPNEA: 0
EYE REDNESS: 0
FEVER: 0
NERVOUS/ANXIOUS: 0
SORE THROAT: 0
FOCAL WEAKNESS: 0
ROS GI COMMENTS: COFFEE-GROUND EMESIS
TINGLING: 1
DIZZINESS: 0
HEADACHES: 0
SEIZURES: 0
BACK PAIN: 0
SHORTNESS OF BREATH: 0
DIAPHORESIS: 0
WEAKNESS: 0
HEMOPTYSIS: 0
EYE DISCHARGE: 0
NECK PAIN: 0
NAUSEA: 1
EYE PAIN: 0
LOSS OF CONSCIOUSNESS: 0
SPUTUM PRODUCTION: 0
MYALGIAS: 0

## 2018-10-03 ASSESSMENT — LIFESTYLE VARIABLES
TREMOR: *
AUDITORY DISTURBANCES: NOT PRESENT
TOTAL SCORE: 4
AVERAGE NUMBER OF DAYS PER WEEK YOU HAVE A DRINK CONTAINING ALCOHOL: 5
AUDITORY DISTURBANCES: NOT PRESENT
HAVE PEOPLE ANNOYED YOU BY CRITICIZING YOUR DRINKING: YES
PAROXYSMAL SWEATS: NO SWEAT VISIBLE
EVER FELT BAD OR GUILTY ABOUT YOUR DRINKING: YES
HOW MANY TIMES IN THE PAST YEAR HAVE YOU HAD 5 OR MORE DRINKS IN A DAY: 5
DOES PATIENT WANT TO STOP DRINKING: YES
VISUAL DISTURBANCES: NOT PRESENT
NAUSEA AND VOMITING: NO NAUSEA AND NO VOMITING
NAUSEA AND VOMITING: NO NAUSEA AND NO VOMITING
ANXIETY: MILDLY ANXIOUS
ANXIETY: NO ANXIETY (AT EASE)
HEADACHE, FULLNESS IN HEAD: NOT PRESENT
TOTAL SCORE: 5
NAUSEA AND VOMITING: NO NAUSEA AND NO VOMITING
HEADACHE, FULLNESS IN HEAD: MILD
TOTAL SCORE: 4
TOTAL SCORE: 1
AUDITORY DISTURBANCES: NOT PRESENT
HEADACHE, FULLNESS IN HEAD: MILD
EVER_SMOKED: YES
HEADACHE, FULLNESS IN HEAD: MODERATELY SEVERE
AGITATION: NORMAL ACTIVITY
ANXIETY: NO ANXIETY (AT EASE)
NAUSEA AND VOMITING: NO NAUSEA AND NO VOMITING
TREMOR: MODERATE TREMOR WITH ARMS EXTENDED
TOTAL SCORE: 4
VISUAL DISTURBANCES: NOT PRESENT
EVER HAD A DRINK FIRST THING IN THE MORNING TO STEADY YOUR NERVES TO GET RID OF A HANGOVER: YES
AGITATION: NORMAL ACTIVITY
VISUAL DISTURBANCES: NOT PRESENT
NAUSEA AND VOMITING: MILD NAUSEA WITH NO VOMITING
AUDITORY DISTURBANCES: NOT PRESENT
PAROXYSMAL SWEATS: NO SWEAT VISIBLE
ANXIETY: NO ANXIETY (AT EASE)
VISUAL DISTURBANCES: NOT PRESENT
TOTAL SCORE: 9
TREMOR: TREMOR NOT VISIBLE BUT CAN BE FELT, FINGERTIP TO FINGERTIP
ANXIETY: MILDLY ANXIOUS
ANXIETY: NO ANXIETY (AT EASE)
AUDITORY DISTURBANCES: NOT PRESENT
DOES PATIENT WANT TO TALK TO SOMEONE ABOUT QUITTING: NO
ON A TYPICAL DAY WHEN YOU DRINK ALCOHOL HOW MANY DRINKS DO YOU HAVE: 6
ORIENTATION AND CLOUDING OF SENSORIUM: ORIENTED AND CAN DO SERIAL ADDITIONS
PAROXYSMAL SWEATS: NO SWEAT VISIBLE
TREMOR: NO TREMOR
PAROXYSMAL SWEATS: NO SWEAT VISIBLE
AGITATION: NORMAL ACTIVITY
AGITATION: NORMAL ACTIVITY
PAROXYSMAL SWEATS: NO SWEAT VISIBLE
VISUAL DISTURBANCES: NOT PRESENT
TREMOR: *
TOTAL SCORE: 4
PAROXYSMAL SWEATS: NO SWEAT VISIBLE
VISUAL DISTURBANCES: NOT PRESENT
ORIENTATION AND CLOUDING OF SENSORIUM: ORIENTED AND CAN DO SERIAL ADDITIONS
AUDITORY DISTURBANCES: NOT PRESENT
AGITATION: NORMAL ACTIVITY
TREMOR: *
EVER_SMOKED: YES
ALCOHOL_USE: YES
NAUSEA AND VOMITING: NO NAUSEA AND NO VOMITING
SUBSTANCE_ABUSE: 1
HEADACHE, FULLNESS IN HEAD: MODERATE
ORIENTATION AND CLOUDING OF SENSORIUM: ORIENTED AND CAN DO SERIAL ADDITIONS
CONSUMPTION TOTAL: POSITIVE
TOTAL SCORE: 8
HEADACHE, FULLNESS IN HEAD: MODERATELY SEVERE
HAVE YOU EVER FELT YOU SHOULD CUT DOWN ON YOUR DRINKING: YES
TOTAL SCORE: 3
ORIENTATION AND CLOUDING OF SENSORIUM: ORIENTED AND CAN DO SERIAL ADDITIONS
AGITATION: NORMAL ACTIVITY
ORIENTATION AND CLOUDING OF SENSORIUM: ORIENTED AND CAN DO SERIAL ADDITIONS
ORIENTATION AND CLOUDING OF SENSORIUM: ORIENTED AND CAN DO SERIAL ADDITIONS

## 2018-10-03 ASSESSMENT — COGNITIVE AND FUNCTIONAL STATUS - GENERAL
SUGGESTED CMS G CODE MODIFIER MOBILITY: CJ
SUGGESTED CMS G CODE MODIFIER DAILY ACTIVITY: CH
MOBILITY SCORE: 21
WALKING IN HOSPITAL ROOM: A LITTLE
DAILY ACTIVITIY SCORE: 24
CLIMB 3 TO 5 STEPS WITH RAILING: A LITTLE
STANDING UP FROM CHAIR USING ARMS: A LITTLE

## 2018-10-03 ASSESSMENT — COPD QUESTIONNAIRES
DO YOU EVER COUGH UP ANY MUCUS OR PHLEGM?: YES, A FEW DAYS A WEEK OR MONTH
COPD SCREENING SCORE: 4
HAVE YOU SMOKED AT LEAST 100 CIGARETTES IN YOUR ENTIRE LIFE: YES
DURING THE PAST 4 WEEKS HOW MUCH DID YOU FEEL SHORT OF BREATH: SOME OF THE TIME
HAVE YOU SMOKED AT LEAST 100 CIGARETTES IN YOUR ENTIRE LIFE: YES
DO YOU EVER COUGH UP ANY MUCUS OR PHLEGM?: NO/ONLY WITH OCCASIONAL COLDS OR INFECTIONS
COPD SCREENING SCORE: 5
IN THE PAST 12 MONTHS DO YOU DO LESS THAN YOU USED TO BECAUSE OF YOUR BREATHING PROBLEMS: DISAGREE/UNSURE
DURING THE PAST 4 WEEKS HOW MUCH DID YOU FEEL SHORT OF BREATH: NONE/LITTLE OF THE TIME

## 2018-10-03 ASSESSMENT — PATIENT HEALTH QUESTIONNAIRE - PHQ9
2. FEELING DOWN, DEPRESSED, IRRITABLE, OR HOPELESS: NOT AT ALL
SUM OF ALL RESPONSES TO PHQ9 QUESTIONS 1 AND 2: 0
1. LITTLE INTEREST OR PLEASURE IN DOING THINGS: NOT AT ALL

## 2018-10-03 ASSESSMENT — PAIN SCALES - GENERAL
PAINLEVEL_OUTOF10: 7
PAINLEVEL_OUTOF10: 5
PAINLEVEL_OUTOF10: 3
PAINLEVEL_OUTOF10: 7
PAINLEVEL_OUTOF10: 3
PAINLEVEL_OUTOF10: 7
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
## 2018-10-03 NOTE — OR SURGEON
Immediate Post OP Note    PreOp Diagnosis: hematemesis    PostOp Diagnosis:    1./normal esophagus, GEJ at 36 cm    2/ multiple superficial ulcers in the body and antrum of the stomach to numerous to count, no active bleeding or residual blood appreciated.     3/ biopsies taken of the antrum for HP, if positive treat   4/ otherwise normal EGD to second portion of the duodenum    Procedure(s):  GASTROSCOPY-ENDO - Wound Class: Clean Contaminated    Surgeon(s):  Ben Negro M.D.    Anesthesiologist/Type of Anesthesia:  No anesthesia staff entered./Sedation  Fentanyl 75 mcg and 9 mg of Versed IV start time for sedation was 1103 stop time was 11:22 am I supervised this   Surgical Staff:  Endoscopy Technician: Stephany Espinosa  Sedation/Monitoring Nurse: Pasquale Lawrence R.N.    Specimens removed if any:  ID Type Source Tests Collected by Time Destination   A : antrum biopsy Tissue Antrum HISTOLOGY REQUEST Ben Negro M.D. 10/3/2018 11:21 AM        Estimated Blood Loss: none    Findings:     Narrative:   Prior to the procedure the patient was informed of the risk and benefits of the procedure all question were answered.  The patient was sedated and once adequate sedation was achieved the bite block was placed. They were placed in the left lateral decubitus positron.   Using the standard Olympus gastroscope it was introduced through the oral pharynx and passed into the esophagus.  Intubation of the esophagus was achieved easily and the entire esophagus appeared normal.  The GEJ 36 cm,it appeared normal.  Intubation of the gastric cavity was performed and insufflation was achieved allowing for a panoramic view of the entire gastric mucosa, multiple superficial ulcers in the body and antrum of the stomach to numerous to count, no active bleeding or residual blood appreciated  Advancement into the antrum allowed for visualization of the antrum and pylorus both of these structures appeared normal except for  the ulcers mentioned no active bleeding noted or visible vessel appreciated..  Intubation of the pylorus allowed for visualization of the duodenal bulb along with the second and third portion, all these structures appeared normal.  No mucosal abnormalities were appreciated.  Extubation of the pylorus allowed for retroflexion to be performed in the antrum to visualize the cardia, fundus and body,again the ulcerations were noted.   Biopsies were taken of the antrum for evaluation of H.pylori.  The Endoscope was withdrawn excess air was removed patient tolerated the procedure well.    Complications:none    Recommendations:    Omeprazole 40 mg PO BID for at least 8 wks  Carafate slurry 1 gm TID PO for at least 8 wks  If HP positive treat  Advance diet to clears then as tolerated  Call if there are any further issues or concerns [phone].    Complications: none        10/3/2018 11:31 AM Ben Negro M.D.

## 2018-10-03 NOTE — ED NOTES
rn at bedside with erp for central line placement. Central line to [facility]. Xray at bedside for line placement confirmation. Ok to use now per erp.

## 2018-10-03 NOTE — ASSESSMENT & PLAN NOTE
EGD with superficial gastric ulcers with no active bleeding  Transition to Prilosec and Carafate  Follow-up on biopsy  Tolerating diet well  Will follow up on GI recommendations

## 2018-10-03 NOTE — PROGRESS NOTES
2 RN skin assessment complete with Octavia Lion.   SCDs Cardiac monitor BP cuff Nasal Cannula all checked. Pt on low air loss mattress turns self. Pt NPO at this time pending GI consult for GI bleed.
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
## 2018-10-03 NOTE — ASSESSMENT & PLAN NOTE
Stable continue to monitor and transfuse if less than 7 or hemodynamic instability  Check B12 folic acid and iron panel

## 2018-10-03 NOTE — ED NOTES
Pt requesting additional pain meds. erp notified. New orders received. Pt calm and cooperative at this time. Pt updated with plan of care. Pt in nad. resp equal and unlabored. Vss at this time. Will continue to monitor.

## 2018-10-03 NOTE — PROGRESS NOTES
[facility] Hospitalist Progress Note    Date of Service: 10/3/2018    Chief Complaint  57 y.o. female admitted 10/2/2018 with hematemesis and hypotension    Interval Problem Update    CIWA 8  No further bleeding overnight  SR 60-70  -130  Afebrile      Consultants/Specialty  GI    Disposition  tele        Review of Systems   Constitutional: Positive for malaise/fatigue. Negative for fever.   HENT: Negative for congestion, ear discharge and nosebleeds.    Eyes: Negative for blurred vision, pain, discharge and redness.   Respiratory: Negative for cough, hemoptysis, sputum production, shortness of breath and stridor.    Cardiovascular: Negative for chest pain, palpitations, orthopnea and leg swelling.   Gastrointestinal: Positive for abdominal pain and nausea. Negative for diarrhea and vomiting.   Genitourinary: Negative for dysuria, flank pain and hematuria.   Musculoskeletal: Positive for joint pain. Negative for back pain, falls and neck pain.   Skin: Negative.    Neurological: Positive for tingling. Negative for speech change, focal weakness, seizures, loss of consciousness, weakness and headaches.   Psychiatric/Behavioral: Positive for substance abuse. Negative for memory loss. The patient is not nervous/anxious.    All other systems reviewed and are negative.     Physical Exam  Laboratory/Imaging   Hemodynamics  Temp (24hrs), Av.7 °C (98.1 °F), Min:36 °C (96.8 °F), Max:37.2 °C (99 °F)   Temperature: 36.6 °C (97.9 °F)  Pulse  Av.9  Min: 69  Max: 109 Heart Rate (Monitored): 70  Blood Pressure: (!) 93/49, NIBP: 122/77      Respiratory      Respiration: 20, Pulse Oximetry: 98 %, O2 Daily Delivery Respiratory : Silicone Nasal Cannula     Work Of Breathing / Effort: Mild  RUL Breath Sounds: Clear, RML Breath Sounds: Clear, RLL Breath Sounds: Diminished, NOREEN Breath Sounds: Clear, LLL Breath Sounds: Diminished    Fluids    Intake/Output Summary (Last 24 hours) at 10/03/18 9975  Last data filed at 10/03/18  0800   Gross per 24 hour   Intake          1445.09 ml   Output              500 ml   Net           945.09 ml       Nutrition  Orders Placed This Encounter   Procedures   • Diet NPO     Standing Status:   Standing     Number of Occurrences:   1     Order Specific Question:   Restrict to:     Answer:   Sips with Medications [3]     Comments:   Ok for sips of water and ice chips     Physical Exam   Constitutional: She is oriented to person, place, and time. She appears well-developed and well-nourished.   HENT:   Head: Normocephalic and atraumatic.   Right Ear: External ear normal.   Left Ear: External ear normal.   Mouth/Throat: No oropharyngeal exudate.   Eyes: Conjunctivae are normal. Right eye exhibits no discharge. Left eye exhibits no discharge. No scleral icterus.   Neck: Neck supple. No JVD present. No tracheal deviation present.   Cardiovascular: Normal rate and regular rhythm.  Exam reveals no gallop and no friction rub.    No murmur heard.  Pulmonary/Chest: Effort normal and breath sounds normal. No stridor. No respiratory distress. She has no wheezes. She has no rales. She exhibits no tenderness.   Abdominal: Soft. Bowel sounds are normal. She exhibits no distension. There is no tenderness. There is no rebound.   Musculoskeletal: She exhibits no edema or tenderness.   Neurological: She is alert and oriented to person, place, and time. No cranial nerve deficit. She exhibits normal muscle tone.   Skin: Skin is warm and dry. She is not diaphoretic. No cyanosis. Nails show no clubbing.   Psychiatric: Her speech is delayed. She expresses impulsivity.   Nursing note and vitals reviewed.      Recent Labs      10/02/18   2036  10/03/18   0445   WBC  12.1*   --    RBC  4.25   --    HEMOGLOBIN  12.2  11.8*   HEMATOCRIT  39.4   --    MCV  92.7   --    MCH  28.7   --    MCHC  31.0*   --    RDW  58.3*   --    PLATELETCT  231   --    MPV  10.1   --      Recent Labs      10/02/18   2036  10/03/18   0445   SODIUM  142  139    POTASSIUM  3.6  3.6   CHLORIDE  102  103   CO2  25  25   GLUCOSE  82  84   BUN  19  19   CREATININE  1.80*  1.56*   CALCIUM  9.4  8.7     Recent Labs      10/02/18   2036   INR  1.00                  Assessment/Plan     GIB (gastrointestinal bleeding)- (present on admission)   Assessment & Plan    EGD with superficial gastric ulcers with no active bleeding  Transition to Prilosec and Carafate  Follow-up on biopsy  Start clear liquid diet  Appreciate GI assistance        Acute kidney injury (HCC)- (present on admission)   Assessment & Plan    Prerenal secondary to GI bleed  Improved continue IV fluids and monitor              Neuropathy (HCC)   Assessment & Plan    Suspect secondary to alcoholism  We will start on low-dose Lyrica        Anemia associated with acute blood loss   Assessment & Plan    Stable continue to monitor and transfuse if less than 7 or hemodynamic instability  Check B12 folic acid and iron panel        Hypotension- (present on admission)   Assessment & Plan    Secondary to hemorrhagic shock  Resolved with hydration        Alcohol withdrawal (HCC)- (present on admission)   Assessment & Plan    Continue thiamine and folic acid  Continue Lorazepam per [facility] protocol  Patient counseled on cessation            Quality-Core Measures   Reviewed items::  Labs reviewed and Medications reviewed  Velásquez catheter::  No Velásquez  Central line in place:  Shock  DVT prophylaxis pharmacological::  Contraindicated - High bleeding risk  DVT prophylaxis - mechanical:  SCDs  Ulcer Prophylaxis::  Yes  Antibiotics:  Treating active infection/contamination beyond 24 hours perioperative coverage      Plan of care reviewed with patient and discussed with nursing staff

## 2018-10-03 NOTE — ED NOTES
Pt requesting po fluids at this time, pt informed of npo status. Pt verbalized understanding. Pt has episode of stool incontinence. Pt assisted with cleaning and new underwear provided. No dark or bloody stool noted. Pt denies any other needs. Awaiting admit md to see pt. Will continue to monitor.
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
## 2018-10-03 NOTE — ASSESSMENT & PLAN NOTE
Continue thiamine and folic acid  Patient counseled on cessation  CIWA discontinued as patient has not required Ativan

## 2018-10-03 NOTE — PROGRESS NOTES
Pt arrived to floor at 0115 pt placed on CIC monitor in room T630. No complaints of nausea vomiting at this time. VSS. Will continue to monitor.

## 2018-10-04 PROBLEM — E83.39 HYPOPHOSPHATEMIA: Status: ACTIVE | Noted: 2018-10-04

## 2018-10-04 LAB
ALBUMIN SERPL BCP-MCNC: 2.9 G/DL (ref 3.2–4.9)
ALBUMIN/GLOB SERPL: 1 G/DL
ALP SERPL-CCNC: 108 U/L (ref 30–99)
ALT SERPL-CCNC: 10 U/L (ref 2–50)
ANION GAP SERPL CALC-SCNC: 6 MMOL/L (ref 0–11.9)
AST SERPL-CCNC: 27 U/L (ref 12–45)
BASOPHILS # BLD AUTO: 1.4 % (ref 0–1.8)
BASOPHILS # BLD: 0.07 K/UL (ref 0–0.12)
BILIRUB SERPL-MCNC: 0.4 MG/DL (ref 0.1–1.5)
BUN SERPL-MCNC: 17 MG/DL (ref 8–22)
CALCIUM SERPL-MCNC: 8.7 MG/DL (ref 8.5–10.5)
CHLORIDE SERPL-SCNC: 103 MMOL/L (ref 96–112)
CO2 SERPL-SCNC: 30 MMOL/L (ref 20–33)
CREAT SERPL-MCNC: 1.06 MG/DL (ref 0.5–1.4)
EKG IMPRESSION: NORMAL
EOSINOPHIL # BLD AUTO: 0.13 K/UL (ref 0–0.51)
EOSINOPHIL NFR BLD: 2.6 % (ref 0–6.9)
ERYTHROCYTE [DISTWIDTH] IN BLOOD BY AUTOMATED COUNT: 61.2 FL (ref 35.9–50)
GLOBULIN SER CALC-MCNC: 3 G/DL (ref 1.9–3.5)
GLUCOSE SERPL-MCNC: 131 MG/DL (ref 65–99)
HCT VFR BLD AUTO: 35.2 % (ref 37–47)
HGB BLD-MCNC: 10.9 G/DL (ref 12–16)
HGB BLD-MCNC: 11.3 G/DL (ref 12–16)
HGB BLD-MCNC: 11.5 G/DL (ref 12–16)
IMM GRANULOCYTES # BLD AUTO: 0.02 K/UL (ref 0–0.11)
IMM GRANULOCYTES NFR BLD AUTO: 0.4 % (ref 0–0.9)
LYMPHOCYTES # BLD AUTO: 1.47 K/UL (ref 1–4.8)
LYMPHOCYTES NFR BLD: 28.9 % (ref 22–41)
MAGNESIUM SERPL-MCNC: 1.8 MG/DL (ref 1.5–2.5)
MCH RBC QN AUTO: 28.5 PG (ref 27–33)
MCHC RBC AUTO-ENTMCNC: 31 G/DL (ref 33.6–35)
MCV RBC AUTO: 92.1 FL (ref 81.4–97.8)
MONOCYTES # BLD AUTO: 0.6 K/UL (ref 0–0.85)
MONOCYTES NFR BLD AUTO: 11.8 % (ref 0–13.4)
NEUTROPHILS # BLD AUTO: 2.8 K/UL (ref 2–7.15)
NEUTROPHILS NFR BLD: 54.9 % (ref 44–72)
NRBC # BLD AUTO: 0 K/UL
NRBC BLD-RTO: 0 /100 WBC
PHOSPHATE SERPL-MCNC: 2.3 MG/DL (ref 2.5–4.5)
PLATELET # BLD AUTO: 159 K/UL (ref 164–446)
PMV BLD AUTO: 9.8 FL (ref 9–12.9)
POTASSIUM SERPL-SCNC: 3.6 MMOL/L (ref 3.6–5.5)
PROT SERPL-MCNC: 5.9 G/DL (ref 6–8.2)
RBC # BLD AUTO: 3.82 M/UL (ref 4.2–5.4)
SODIUM SERPL-SCNC: 139 MMOL/L (ref 135–145)
WBC # BLD AUTO: 5.1 K/UL (ref 4.8–10.8)

## 2018-10-04 PROCEDURE — A9270 NON-COVERED ITEM OR SERVICE: HCPCS | Performed by: HOSPITALIST

## 2018-10-04 PROCEDURE — 700102 HCHG RX REV CODE 250 W/ 637 OVERRIDE(OP): Performed by: INTERNAL MEDICINE

## 2018-10-04 PROCEDURE — 84100 ASSAY OF PHOSPHORUS: CPT

## 2018-10-04 PROCEDURE — 700102 HCHG RX REV CODE 250 W/ 637 OVERRIDE(OP): Performed by: HOSPITALIST

## 2018-10-04 PROCEDURE — 99232 SBSQ HOSP IP/OBS MODERATE 35: CPT | Performed by: HOSPITALIST

## 2018-10-04 PROCEDURE — 93005 ELECTROCARDIOGRAM TRACING: CPT | Performed by: INTERNAL MEDICINE

## 2018-10-04 PROCEDURE — 700111 HCHG RX REV CODE 636 W/ 250 OVERRIDE (IP): Performed by: INTERNAL MEDICINE

## 2018-10-04 PROCEDURE — A9270 NON-COVERED ITEM OR SERVICE: HCPCS | Performed by: INTERNAL MEDICINE

## 2018-10-04 PROCEDURE — 80053 COMPREHEN METABOLIC PANEL: CPT

## 2018-10-04 PROCEDURE — 36415 COLL VENOUS BLD VENIPUNCTURE: CPT

## 2018-10-04 PROCEDURE — 700101 HCHG RX REV CODE 250: Performed by: INTERNAL MEDICINE

## 2018-10-04 PROCEDURE — 700111 HCHG RX REV CODE 636 W/ 250 OVERRIDE (IP): Performed by: HOSPITALIST

## 2018-10-04 PROCEDURE — 700105 HCHG RX REV CODE 258: Performed by: HOSPITALIST

## 2018-10-04 PROCEDURE — 93010 ELECTROCARDIOGRAM REPORT: CPT | Performed by: INTERNAL MEDICINE

## 2018-10-04 PROCEDURE — 85025 COMPLETE CBC W/AUTO DIFF WBC: CPT

## 2018-10-04 PROCEDURE — 85018 HEMOGLOBIN: CPT

## 2018-10-04 PROCEDURE — 83735 ASSAY OF MAGNESIUM: CPT

## 2018-10-04 PROCEDURE — 770020 HCHG ROOM/CARE - TELE (206)

## 2018-10-04 PROCEDURE — 700105 HCHG RX REV CODE 258: Performed by: INTERNAL MEDICINE

## 2018-10-04 RX ORDER — THIAMINE MONONITRATE (VIT B1) 100 MG
100 TABLET ORAL DAILY
Status: DISCONTINUED | OUTPATIENT
Start: 2018-10-05 | End: 2018-10-05 | Stop reason: HOSPADM

## 2018-10-04 RX ORDER — AMOXICILLIN 250 MG
2 CAPSULE ORAL 2 TIMES DAILY
Status: DISCONTINUED | OUTPATIENT
Start: 2018-10-04 | End: 2018-10-05 | Stop reason: HOSPADM

## 2018-10-04 RX ORDER — FOLIC ACID 1 MG/1
1 TABLET ORAL DAILY
Status: DISCONTINUED | OUTPATIENT
Start: 2018-10-05 | End: 2018-10-05 | Stop reason: HOSPADM

## 2018-10-04 RX ORDER — BISACODYL 10 MG
10 SUPPOSITORY, RECTAL RECTAL
Status: DISCONTINUED | OUTPATIENT
Start: 2018-10-04 | End: 2018-10-05 | Stop reason: HOSPADM

## 2018-10-04 RX ORDER — POLYETHYLENE GLYCOL 3350 17 G/17G
1 POWDER, FOR SOLUTION ORAL
Status: DISCONTINUED | OUTPATIENT
Start: 2018-10-04 | End: 2018-10-05 | Stop reason: HOSPADM

## 2018-10-04 RX ORDER — GABAPENTIN 100 MG/1
100 CAPSULE ORAL 3 TIMES DAILY
Status: DISCONTINUED | OUTPATIENT
Start: 2018-10-04 | End: 2018-10-05

## 2018-10-04 RX ADMIN — MORPHINE SULFATE 4 MG: 4 INJECTION INTRAVENOUS at 12:01

## 2018-10-04 RX ADMIN — MORPHINE SULFATE 4 MG: 4 INJECTION INTRAVENOUS at 18:06

## 2018-10-04 RX ADMIN — OMEPRAZOLE 40 MG: 20 CAPSULE, DELAYED RELEASE ORAL at 04:48

## 2018-10-04 RX ADMIN — MORPHINE SULFATE 4 MG: 4 INJECTION INTRAVENOUS at 21:41

## 2018-10-04 RX ADMIN — ONDANSETRON 4 MG: 2 INJECTION INTRAMUSCULAR; INTRAVENOUS at 04:49

## 2018-10-04 RX ADMIN — PREGABALIN 25 MG: 25 CAPSULE ORAL at 12:00

## 2018-10-04 RX ADMIN — MORPHINE SULFATE 4 MG: 4 INJECTION INTRAVENOUS at 01:00

## 2018-10-04 RX ADMIN — OMEPRAZOLE 40 MG: 20 CAPSULE, DELAYED RELEASE ORAL at 18:06

## 2018-10-04 RX ADMIN — SUCRALFATE 1 G: 1 SUSPENSION ORAL at 01:03

## 2018-10-04 RX ADMIN — PREGABALIN 25 MG: 25 CAPSULE ORAL at 04:49

## 2018-10-04 RX ADMIN — SUCRALFATE 1 G: 1 SUSPENSION ORAL at 12:00

## 2018-10-04 RX ADMIN — ONDANSETRON 4 MG: 2 INJECTION INTRAMUSCULAR; INTRAVENOUS at 14:44

## 2018-10-04 RX ADMIN — STANDARDIZED SENNA CONCENTRATE AND DOCUSATE SODIUM 2 TABLET: 8.6; 5 TABLET ORAL at 12:00

## 2018-10-04 RX ADMIN — STANDARDIZED SENNA CONCENTRATE AND DOCUSATE SODIUM 2 TABLET: 8.6; 5 TABLET ORAL at 18:06

## 2018-10-04 RX ADMIN — THIAMINE HYDROCHLORIDE 100 MG: 100 INJECTION, SOLUTION INTRAMUSCULAR; INTRAVENOUS at 04:52

## 2018-10-04 RX ADMIN — DIBASIC SODIUM PHOSPHATE, MONOBASIC POTASSIUM PHOSPHATE AND MONOBASIC SODIUM PHOSPHATE 1 TABLET: 852; 155; 130 TABLET ORAL at 18:06

## 2018-10-04 RX ADMIN — MORPHINE SULFATE 4 MG: 4 INJECTION INTRAVENOUS at 07:59

## 2018-10-04 RX ADMIN — SUCRALFATE 1 G: 1 SUSPENSION ORAL at 18:06

## 2018-10-04 RX ADMIN — FOLIC ACID 1 MG: 5 INJECTION, SOLUTION INTRAMUSCULAR; INTRAVENOUS; SUBCUTANEOUS at 04:52

## 2018-10-04 RX ADMIN — SODIUM CHLORIDE, POTASSIUM CHLORIDE, SODIUM LACTATE AND CALCIUM CHLORIDE: 600; 310; 30; 20 INJECTION, SOLUTION INTRAVENOUS at 00:59

## 2018-10-04 RX ADMIN — SUCRALFATE 1 G: 1 SUSPENSION ORAL at 04:49

## 2018-10-04 ASSESSMENT — LIFESTYLE VARIABLES
AGITATION: NORMAL ACTIVITY
TOTAL SCORE: 4
AGITATION: NORMAL ACTIVITY
ORIENTATION AND CLOUDING OF SENSORIUM: ORIENTED AND CAN DO SERIAL ADDITIONS
NAUSEA AND VOMITING: NO NAUSEA AND NO VOMITING
EVER FELT BAD OR GUILTY ABOUT YOUR DRINKING: NO
HOW MANY TIMES IN THE PAST YEAR HAVE YOU HAD 5 OR MORE DRINKS IN A DAY: 1
ANXIETY: NO ANXIETY (AT EASE)
HEADACHE, FULLNESS IN HEAD: VERY MILD
AUDITORY DISTURBANCES: NOT PRESENT
HAVE PEOPLE ANNOYED YOU BY CRITICIZING YOUR DRINKING: NO
DO YOU DRINK ALCOHOL: YES
PAROXYSMAL SWEATS: NO SWEAT VISIBLE
VISUAL DISTURBANCES: NOT PRESENT
PAROXYSMAL SWEATS: NO SWEAT VISIBLE
NAUSEA AND VOMITING: NO NAUSEA AND NO VOMITING
AUDITORY DISTURBANCES: NOT PRESENT
CONSUMPTION TOTAL: POSITIVE
ORIENTATION AND CLOUDING OF SENSORIUM: ORIENTED AND CAN DO SERIAL ADDITIONS
HEADACHE, FULLNESS IN HEAD: MILD
VISUAL DISTURBANCES: NOT PRESENT
HEADACHE, FULLNESS IN HEAD: NOT PRESENT
DOES PATIENT WANT TO TALK TO SOMEONE ABOUT QUITTING: NO
TOTAL SCORE: 1
ORIENTATION AND CLOUDING OF SENSORIUM: ORIENTED AND CAN DO SERIAL ADDITIONS
AGITATION: NORMAL ACTIVITY
NAUSEA AND VOMITING: NO NAUSEA AND NO VOMITING
TREMOR: TREMOR NOT VISIBLE BUT CAN BE FELT, FINGERTIP TO FINGERTIP
VISUAL DISTURBANCES: NOT PRESENT
TREMOR: *
ANXIETY: NO ANXIETY (AT EASE)
TOTAL SCORE: 0
TREMOR: *
ANXIETY: NO ANXIETY (AT EASE)
ANXIETY: NO ANXIETY (AT EASE)
TREMOR: TREMOR NOT VISIBLE BUT CAN BE FELT, FINGERTIP TO FINGERTIP
AUDITORY DISTURBANCES: NOT PRESENT
TOTAL SCORE: 4
TOTAL SCORE: 0
VISUAL DISTURBANCES: NOT PRESENT
PAROXYSMAL SWEATS: NO SWEAT VISIBLE
HEADACHE, FULLNESS IN HEAD: MILD
TOTAL SCORE: 0
AUDITORY DISTURBANCES: NOT PRESENT
ON A TYPICAL DAY WHEN YOU DRINK ALCOHOL HOW MANY DRINKS DO YOU HAVE: 1
TOTAL SCORE: 2
PAROXYSMAL SWEATS: NO SWEAT VISIBLE
AGITATION: NORMAL ACTIVITY
HAVE YOU EVER FELT YOU SHOULD CUT DOWN ON YOUR DRINKING: NO
DOES PATIENT WANT TO STOP DRINKING: NO
NAUSEA AND VOMITING: NO NAUSEA AND NO VOMITING
ORIENTATION AND CLOUDING OF SENSORIUM: ORIENTED AND CAN DO SERIAL ADDITIONS
EVER HAD A DRINK FIRST THING IN THE MORNING TO STEADY YOUR NERVES TO GET RID OF A HANGOVER: NO
AVERAGE NUMBER OF DAYS PER WEEK YOU HAVE A DRINK CONTAINING ALCOHOL: 1

## 2018-10-04 ASSESSMENT — PAIN SCALES - GENERAL
PAINLEVEL_OUTOF10: 0
PAINLEVEL_OUTOF10: 2
PAINLEVEL_OUTOF10: 8
PAINLEVEL_OUTOF10: 3
PAINLEVEL_OUTOF10: 3
PAINLEVEL_OUTOF10: 4
PAINLEVEL_OUTOF10: 7
PAINLEVEL_OUTOF10: 3
PAINLEVEL_OUTOF10: 0
PAINLEVEL_OUTOF10: 8
PAINLEVEL_OUTOF10: 7
PAINLEVEL_OUTOF10: 7

## 2018-10-04 ASSESSMENT — ENCOUNTER SYMPTOMS
HEADACHES: 1
VOMITING: 0
BLOOD IN STOOL: 0
SHORTNESS OF BREATH: 0
NAUSEA: 1
CHILLS: 0
ABDOMINAL PAIN: 1
CONSTIPATION: 1
FEVER: 0
COUGH: 0
PALPITATIONS: 0

## 2018-10-04 ASSESSMENT — PATIENT HEALTH QUESTIONNAIRE - PHQ9
SUM OF ALL RESPONSES TO PHQ9 QUESTIONS 1 AND 2: 0
1. LITTLE INTEREST OR PLEASURE IN DOING THINGS: NOT AT ALL
2. FEELING DOWN, DEPRESSED, IRRITABLE, OR HOPELESS: NOT AT ALL

## 2018-10-04 NOTE — DISCHARGE PLANNING
LSW attempted to speak with patient several times but patient has been asleep. LSW left resource list including drug/alcohol counseling resources with bedside nurse.

## 2018-10-04 NOTE — PROGRESS NOTES
Patient complaining of 7/10 abdominal pain, morphine order states to administer medication until 10 mg had been reached. 10 mg limit reached prior to start of shift, Dr. Rodrigez notified and received orders to extend order.

## 2018-10-04 NOTE — PROGRESS NOTES
[facility] Medicine Daily Progress Note    Date of Service  10/4/2018    Chief Complaint  57 y.o. female admitted 10/2/2018 with GI bleed.     Hospital Course   For full admission details please refer to Dr. Osuna's H&P dated 10/2.  In brief this is a 57-year-old female presenting with hematemesis.  She was admitted to ICU due to hypotension which improved with IV fluids.  She was started on IV Protonix, IV octreotide, and kept n.p.o. for her GI bleed.  EGD was done which showed multiple superficial ulcers in the body and antrum of the stomach that were too numerous to count.  Patient was started on p.o. omeprazole 40 mg twice daily and Carafate.  She has been tolerating her diet well.  Patient was extensively counseled on importance of alcohol cessation.**     Interval Problem Update  Transferred out of ICU yesterday  Tolerating regular diet  Having headache   Generalized abdominal pain and constipation  CIWA about 4, no need for ativan     Consultants/Specialty  gastroenterology    Code Status  Full code     Disposition  Home once medically cleared     Review of Systems  Review of Systems   Constitutional: Negative for chills and fever.   Respiratory: Negative for cough and shortness of breath.    Cardiovascular: Negative for chest pain and palpitations.   Gastrointestinal: Positive for abdominal pain, constipation and nausea. Negative for blood in stool and vomiting.   Genitourinary: Negative for dysuria and urgency.   Neurological: Positive for headaches.   All other systems reviewed and are negative.       Physical Exam  Temp:  [36.4 °C (97.5 °F)-37 °C (98.6 °F)] 36.6 °C (97.9 °F)  Pulse:  [69-84] 78  Resp:  [10-18] 18  BP: (137-160)/(84-94) 146/84    Physical Exam   Constitutional: She is oriented to person, place, and time. She appears well-developed and well-nourished.   HENT:   Head: Normocephalic and atraumatic.   Eyes: Pupils are equal, round, and reactive to light. EOM are normal.   Neck: Normal range of  motion. Neck supple.   Cardiovascular: Normal rate and regular rhythm.    Pulmonary/Chest: Effort normal and breath sounds normal. No respiratory distress.   Abdominal: Soft. Bowel sounds are normal. She exhibits no distension. There is no tenderness.   Musculoskeletal: Normal range of motion. She exhibits no edema.   Neurological: She is alert and oriented to person, place, and time.   Skin: Skin is warm and dry.   Psychiatric: She has a normal mood and affect.   Nursing note and vitals reviewed.      Fluids    Intake/Output Summary (Last 24 hours) at 10/04/18 1437  Last data filed at 10/03/18 1600   Gross per 24 hour   Intake              250 ml   Output                0 ml   Net              250 ml       Laboratory  Recent Labs      10/02/18   2036   10/03/18   1700  10/04/18   0100  10/04/18   0945   WBC  12.1*   --    --   5.1   --    RBC  4.25   --    --   3.82*   --    HEMOGLOBIN  12.2   < >  11.6*  10.9*  11.3*   HEMATOCRIT  39.4   --    --   35.2*   --    MCV  92.7   --    --   92.1   --    MCH  28.7   --    --   28.5   --    MCHC  31.0*   --    --   31.0*   --    RDW  58.3*   --    --   61.2*   --    PLATELETCT  231   --    --   159*   --    MPV  10.1   --    --   9.8   --     < > = values in this interval not displayed.     Recent Labs      10/02/18   2036  10/03/18   0445  10/04/18   0100   SODIUM  142  139  139   POTASSIUM  3.6  3.6  3.6   CHLORIDE  102  103  103   CO2  25  25  30   GLUCOSE  82  84  131*   BUN  19  19  17   CREATININE  1.80*  1.56*  1.06   CALCIUM  9.4  8.7  8.7     Recent Labs      10/02/18   2036   INR  1.00               Imaging  CT-CHEST,ABDOMEN,PELVIS WITH   Final Result         1.  Mild fluid-filled prominence of small bowel with reactive mucosal pattern, appearance compatible with ileus or enteritis, radiographic follow-up to resolution recommended as clinically appropriate.   2.  Left thyroid nodule, recommend follow-up thyroid sonography for further characterization.   3.   Small hiatal hernia   4.  Hepatomegaly and diffuse hepatic steatosis   5.  Atherosclerosis      CT-LSPINE W/O PLUS RECONS   Final Result         1.  No acute traumatic bony injury of the lumbar spine.      CT-TSPINE W/O PLUS RECONS   Final Result         1.  No acute traumatic bony injury of the thoracic spine.      CT-CSPINE WITHOUT PLUS RECONS   Final Result         1.  Multilevel degenerative changes of the cervical spine limit diagnostic sensitivity of this examination, otherwise no acute traumatic bony injury of the cervical spine is apparent.      CT-HEAD W/O   Final Result         1.  No acute intracranial abnormality is identified, there are nonspecific white matter changes, commonly associated with small vessel ischemic disease.  Associated mild cerebral atrophy is noted.      DX-CHEST-PORTABLE (1 VIEW)   Final Result         1.  No acute cardiopulmonary disease.           Assessment/Plan  GIB (gastrointestinal bleeding)- (present on admission)   Assessment & Plan    EGD with superficial gastric ulcers with no active bleeding  Transition to Prilosec and Carafate  Follow-up on biopsy  Tolerating diet well  Will follow up on GI recommendations          Acute kidney injury (HCC)- (present on admission)   Assessment & Plan    Prerenal secondary to GI bleed and hypotension  Resolved with IVF              Hypophosphatemia   Assessment & Plan    Phos levels trending down  Will start k phos  Recheck in AM        Neuropathy (HCC)   Assessment & Plan    Suspect secondary to alcoholism  We will start on low-dose gabapentin        Anemia associated with acute blood loss   Assessment & Plan    Stable continue to monitor and transfuse if less than 7 or hemodynamic instability  Check B12 folic acid and iron panel        Alcohol withdrawal (HCC)- (present on admission)   Assessment & Plan    Continue thiamine and folic acid  Patient counseled on cessation  CIWA discontinued as patient has not required Ativan              VTE prophylaxis: SCDs

## 2018-10-04 NOTE — PROGRESS NOTES
Patient care assumed, bedside report received, POC discussed, verbalizes understanding. Safety measures in place, call light in place.

## 2018-10-04 NOTE — PROGRESS NOTES
Received report and care assumed. Patient A&Ox 4. Pt. Reports pain in abdomen, states it has improved. Work of breathing even and mildly labored on 2L NC. Discussed POC. Call light within reach and pt. instructed to call when in need of assistance.  Denies any other needs at this time.

## 2018-10-04 NOTE — PROGRESS NOTES
Two RN skin assessment with García RN: generalized skin dryness and bruise to right flank otherwise skin intact. Scar to left ankle.

## 2018-10-05 VITALS
OXYGEN SATURATION: 91 % | HEART RATE: 79 BPM | DIASTOLIC BLOOD PRESSURE: 82 MMHG | SYSTOLIC BLOOD PRESSURE: 140 MMHG | RESPIRATION RATE: 20 BRPM | BODY MASS INDEX: 32.76 KG/M2 | WEIGHT: 196.65 LBS | TEMPERATURE: 97.8 F | HEIGHT: 65 IN

## 2018-10-05 PROBLEM — N17.9 ACUTE KIDNEY INJURY (HCC): Status: RESOLVED | Noted: 2018-07-18 | Resolved: 2018-10-05

## 2018-10-05 PROBLEM — K92.2 GIB (GASTROINTESTINAL BLEEDING): Status: RESOLVED | Noted: 2018-10-02 | Resolved: 2018-10-05

## 2018-10-05 PROBLEM — D62 ANEMIA ASSOCIATED WITH ACUTE BLOOD LOSS: Status: RESOLVED | Noted: 2018-10-03 | Resolved: 2018-10-05

## 2018-10-05 PROBLEM — E83.39 HYPOPHOSPHATEMIA: Status: RESOLVED | Noted: 2018-10-04 | Resolved: 2018-10-05

## 2018-10-05 LAB
EKG IMPRESSION: NORMAL
ERYTHROCYTE [DISTWIDTH] IN BLOOD BY AUTOMATED COUNT: 60.6 FL (ref 35.9–50)
HCT VFR BLD AUTO: 35.1 % (ref 37–47)
HGB BLD-MCNC: 11.1 G/DL (ref 12–16)
MCH RBC QN AUTO: 29.8 PG (ref 27–33)
MCHC RBC AUTO-ENTMCNC: 31.6 G/DL (ref 33.6–35)
MCV RBC AUTO: 94.4 FL (ref 81.4–97.8)
PHOSPHATE SERPL-MCNC: 2.7 MG/DL (ref 2.5–4.5)
PLATELET # BLD AUTO: 163 K/UL (ref 164–446)
PMV BLD AUTO: 11 FL (ref 9–12.9)
RBC # BLD AUTO: 3.72 M/UL (ref 4.2–5.4)
WBC # BLD AUTO: 5.3 K/UL (ref 4.8–10.8)

## 2018-10-05 PROCEDURE — 700102 HCHG RX REV CODE 250 W/ 637 OVERRIDE(OP): Performed by: INTERNAL MEDICINE

## 2018-10-05 PROCEDURE — 700102 HCHG RX REV CODE 250 W/ 637 OVERRIDE(OP): Performed by: HOSPITALIST

## 2018-10-05 PROCEDURE — A9270 NON-COVERED ITEM OR SERVICE: HCPCS | Performed by: INTERNAL MEDICINE

## 2018-10-05 PROCEDURE — 93010 ELECTROCARDIOGRAM REPORT: CPT | Performed by: INTERNAL MEDICINE

## 2018-10-05 PROCEDURE — 3E02340 INTRODUCTION OF INFLUENZA VACCINE INTO MUSCLE, PERCUTANEOUS APPROACH: ICD-10-PCS | Performed by: HOSPITALIST

## 2018-10-05 PROCEDURE — A9270 NON-COVERED ITEM OR SERVICE: HCPCS | Performed by: HOSPITALIST

## 2018-10-05 PROCEDURE — 700111 HCHG RX REV CODE 636 W/ 250 OVERRIDE (IP): Performed by: HOSPITALIST

## 2018-10-05 PROCEDURE — 36415 COLL VENOUS BLD VENIPUNCTURE: CPT

## 2018-10-05 PROCEDURE — 90471 IMMUNIZATION ADMIN: CPT

## 2018-10-05 PROCEDURE — 99239 HOSP IP/OBS DSCHRG MGMT >30: CPT | Performed by: HOSPITALIST

## 2018-10-05 PROCEDURE — 84100 ASSAY OF PHOSPHORUS: CPT

## 2018-10-05 PROCEDURE — 85027 COMPLETE CBC AUTOMATED: CPT

## 2018-10-05 PROCEDURE — 90686 IIV4 VACC NO PRSV 0.5 ML IM: CPT | Performed by: HOSPITALIST

## 2018-10-05 PROCEDURE — 93005 ELECTROCARDIOGRAM TRACING: CPT | Performed by: INTERNAL MEDICINE

## 2018-10-05 RX ORDER — SUCRALFATE ORAL 1 G/10ML
1 SUSPENSION ORAL EVERY 6 HOURS
Qty: 600 ML | Refills: 0 | Status: SHIPPED | OUTPATIENT
Start: 2018-10-05 | End: 2019-01-02

## 2018-10-05 RX ORDER — FOLIC ACID 1 MG/1
1 TABLET ORAL DAILY
Qty: 30 TAB | Refills: 0 | Status: SHIPPED | OUTPATIENT
Start: 2018-10-06 | End: 2019-03-10

## 2018-10-05 RX ORDER — ACETAMINOPHEN 325 MG/1
650 TABLET ORAL EVERY 4 HOURS PRN
Qty: 30 TAB | Refills: 0 | Status: SHIPPED | OUTPATIENT
Start: 2018-10-05 | End: 2018-10-05

## 2018-10-05 RX ORDER — DULOXETIN HYDROCHLORIDE 20 MG/1
20 CAPSULE, DELAYED RELEASE ORAL DAILY
Qty: 30 CAP | Refills: 0 | Status: SHIPPED | OUTPATIENT
Start: 2018-10-06 | End: 2019-03-10

## 2018-10-05 RX ORDER — ACETAMINOPHEN 325 MG/1
650 TABLET ORAL EVERY 4 HOURS PRN
Qty: 30 TAB | Refills: 0 | Status: SHIPPED | OUTPATIENT
Start: 2018-10-05 | End: 2019-03-10

## 2018-10-05 RX ORDER — DULOXETIN HYDROCHLORIDE 20 MG/1
20 CAPSULE, DELAYED RELEASE ORAL DAILY
Qty: 30 CAP | Refills: 0 | Status: SHIPPED | OUTPATIENT
Start: 2018-10-06 | End: 2018-10-05

## 2018-10-05 RX ORDER — LANOLIN ALCOHOL/MO/W.PET/CERES
100 CREAM (GRAM) TOPICAL DAILY
Qty: 30 TAB | Refills: 0 | Status: SHIPPED | OUTPATIENT
Start: 2018-10-06 | End: 2019-03-10

## 2018-10-05 RX ORDER — SUCRALFATE ORAL 1 G/10ML
1 SUSPENSION ORAL EVERY 6 HOURS
Qty: 600 ML | Refills: 0 | Status: SHIPPED | OUTPATIENT
Start: 2018-10-05 | End: 2018-10-05

## 2018-10-05 RX ORDER — OMEPRAZOLE 40 MG/1
40 CAPSULE, DELAYED RELEASE ORAL 2 TIMES DAILY
Qty: 60 CAP | Refills: 0 | Status: SHIPPED | OUTPATIENT
Start: 2018-10-05 | End: 2018-10-05

## 2018-10-05 RX ORDER — DULOXETIN HYDROCHLORIDE 20 MG/1
20 CAPSULE, DELAYED RELEASE ORAL DAILY
Status: DISCONTINUED | OUTPATIENT
Start: 2018-10-05 | End: 2018-10-05 | Stop reason: HOSPADM

## 2018-10-05 RX ORDER — FOLIC ACID 1 MG/1
1 TABLET ORAL DAILY
Qty: 30 TAB | Refills: 0 | Status: SHIPPED | OUTPATIENT
Start: 2018-10-06 | End: 2018-10-05

## 2018-10-05 RX ORDER — OMEPRAZOLE 40 MG/1
40 CAPSULE, DELAYED RELEASE ORAL 2 TIMES DAILY
Qty: 60 CAP | Refills: 0 | Status: SHIPPED | OUTPATIENT
Start: 2018-10-05 | End: 2019-01-02

## 2018-10-05 RX ORDER — LANOLIN ALCOHOL/MO/W.PET/CERES
100 CREAM (GRAM) TOPICAL DAILY
Qty: 30 TAB | Refills: 0 | Status: SHIPPED | OUTPATIENT
Start: 2018-10-06 | End: 2018-10-05

## 2018-10-05 RX ADMIN — INFLUENZA A VIRUS A/MICHIGAN/45/2015 X-275 (H1N1) ANTIGEN (FORMALDEHYDE INACTIVATED), INFLUENZA A VIRUS A/SINGAPORE/INFIMH-16-0019/2016 IVR-186 (H3N2) ANTIGEN (FORMALDEHYDE INACTIVATED), INFLUENZA B VIRUS B/PHUKET/3073/2013 ANTIGEN (FORMALDEHYDE INACTIVATED), AND INFLUENZA B VIRUS B/MARYLAND/15/2016 BX-69A ANTIGEN (FORMALDEHYDE INACTIVATED) 0.5 ML: 15; 15; 15; 15 INJECTION, SUSPENSION INTRAMUSCULAR at 12:40

## 2018-10-05 RX ADMIN — ACETAMINOPHEN 650 MG: 325 TABLET, FILM COATED ORAL at 09:50

## 2018-10-05 RX ADMIN — MORPHINE SULFATE 4 MG: 4 INJECTION INTRAVENOUS at 00:41

## 2018-10-05 RX ADMIN — FOLIC ACID 1 MG: 1 TABLET ORAL at 05:28

## 2018-10-05 RX ADMIN — DIBASIC SODIUM PHOSPHATE, MONOBASIC POTASSIUM PHOSPHATE AND MONOBASIC SODIUM PHOSPHATE 1 TABLET: 852; 155; 130 TABLET ORAL at 05:35

## 2018-10-05 RX ADMIN — OMEPRAZOLE 40 MG: 20 CAPSULE, DELAYED RELEASE ORAL at 05:28

## 2018-10-05 RX ADMIN — THIAMINE HCL TAB 100 MG 100 MG: 100 TAB at 05:27

## 2018-10-05 RX ADMIN — DULOXETINE HYDROCHLORIDE 20 MG: 20 CAPSULE, DELAYED RELEASE ORAL at 11:42

## 2018-10-05 RX ADMIN — SUCRALFATE 1 G: 1 SUSPENSION ORAL at 05:26

## 2018-10-05 RX ADMIN — MAGNESIUM HYDROXIDE 30 ML: 400 SUSPENSION ORAL at 05:26

## 2018-10-05 RX ADMIN — SUCRALFATE 1 G: 1 SUSPENSION ORAL at 00:39

## 2018-10-05 RX ADMIN — STANDARDIZED SENNA CONCENTRATE AND DOCUSATE SODIUM 2 TABLET: 8.6; 5 TABLET ORAL at 05:27

## 2018-10-05 RX ADMIN — SUCRALFATE 1 G: 1 SUSPENSION ORAL at 11:44

## 2018-10-05 RX ADMIN — MORPHINE SULFATE 4 MG: 4 INJECTION INTRAVENOUS at 05:28

## 2018-10-05 ASSESSMENT — PAIN SCALES - GENERAL
PAINLEVEL_OUTOF10: 7
PAINLEVEL_OUTOF10: 2
PAINLEVEL_OUTOF10: 5
PAINLEVEL_OUTOF10: 1
PAINLEVEL_OUTOF10: 1
PAINLEVEL_OUTOF10: 7
PAINLEVEL_OUTOF10: 1
PAINLEVEL_OUTOF10: 4
PAINLEVEL_OUTOF10: 4

## 2018-10-05 ASSESSMENT — LIFESTYLE VARIABLES
VISUAL DISTURBANCES: NOT PRESENT
TOTAL SCORE: 1
NAUSEA AND VOMITING: NO NAUSEA AND NO VOMITING
HEADACHE, FULLNESS IN HEAD: NOT PRESENT
NAUSEA AND VOMITING: NO NAUSEA AND NO VOMITING
ORIENTATION AND CLOUDING OF SENSORIUM: ORIENTED AND CAN DO SERIAL ADDITIONS
ORIENTATION AND CLOUDING OF SENSORIUM: ORIENTED AND CAN DO SERIAL ADDITIONS
AUDITORY DISTURBANCES: NOT PRESENT
AGITATION: NORMAL ACTIVITY
ANXIETY: NO ANXIETY (AT EASE)
PAROXYSMAL SWEATS: NO SWEAT VISIBLE
ANXIETY: NO ANXIETY (AT EASE)
AUDITORY DISTURBANCES: NOT PRESENT
TREMOR: TREMOR NOT VISIBLE BUT CAN BE FELT, FINGERTIP TO FINGERTIP
TREMOR: TREMOR NOT VISIBLE BUT CAN BE FELT, FINGERTIP TO FINGERTIP
PAROXYSMAL SWEATS: NO SWEAT VISIBLE
VISUAL DISTURBANCES: NOT PRESENT
TOTAL SCORE: 1
AGITATION: NORMAL ACTIVITY
HEADACHE, FULLNESS IN HEAD: NOT PRESENT

## 2018-10-05 NOTE — DISCHARGE SUMMARY
Discharge Summary    CHIEF COMPLAINT ON ADMISSION  Chief Complaint   Patient presents with   • Abdominal Pain   • Blood in Vomit       Reason for Admission  EMS     Admission Date  10/2/2018    CODE STATUS  Full Code    HPI & HOSPITAL COURSE    For full admission details please refer to Dr. Osuna's H&P dated 10/2.  In brief this is a 57-year-old female presenting with hematemesis.  She was admitted to ICU due to hypotension which improved with IV fluids.  She was started on IV Protonix, IV octreotide, and kept n.p.o. for her GI bleed.  EGD was done which showed multiple superficial ulcers in the body and antrum of the stomach that were too numerous to count.  Patient was started on p.o. omeprazole 40 mg twice daily and Carafate.  She has been tolerating her diet well.  Patient was extensively counseled on importance of alcohol cessation. She was started on duloxetine for her chronic pain and neuropathy.    Therefore, she is discharged in good and stable condition to home with close outpatient follow-up.    The patient met 2-midnight criteria for an inpatient stay at the time of discharge.    Discharge Date  10/5/2018    FOLLOW UP ITEMS POST DISCHARGE  Patient to follow up with GI as needed  F/u on EGD biopsy results    DISCHARGE DIAGNOSES  Active Problems:    Neuropathy (HCC) POA: Unknown  Resolved Problems:    Acute kidney injury (HCC) POA: Yes    GIB (gastrointestinal bleeding) POA: Yes    Alcohol withdrawal (HCC) POA: Yes    Hypotension POA: Yes    Anemia associated with acute blood loss POA: Unknown    Hypophosphatemia POA: Unknown      FOLLOW UP      Oct 24, 2018  3:00 PM PDT  New Patient with Carrington Hdez M.D.  [facility] Med - Internal Medicine (--) [facility] [address] [place] [id number]  [phone]       MEDICATIONS ON DISCHARGE     Medication List      START taking these medications      Instructions   acetaminophen 325 MG Tabs  Commonly known as:  TYLENOL   Take 2 Tabs by  mouth every four hours as needed.  Dose:  650 mg     DULoxetine 20 MG Cpep  Commonly known as:  CYMBALTA   Take 1 Cap by mouth every day.  Dose:  20 mg     folic acid 1 MG Tabs  Commonly known as:  FOLVITE   Take 1 Tab by mouth every day.  Dose:  1 mg     omeprazole 40 MG delayed-release capsule  Commonly known as:  PRILOSEC   Take 1 Cap by mouth 2 Times a Day.  Dose:  40 mg     sucralfate 1 GM/10ML Susp  Commonly known as:  CARAFATE   Take 10 mL by mouth every 6 hours.  Dose:  1 g     thiamine 100 MG tablet  Commonly known as:  THIAMINE   Take 1 Tab by mouth every day.  Dose:  100 mg            Allergies  Allergies   Allergen Reactions   • Sulfa Drugs Vomiting   • Toradol Vomiting   • Neurontin [Gabapentin]      Bowel incontinence     • Amoxicillin Rash     Reported by NAIDA on arrival to ED    Pt states she takes PCN 10/3       DIET  Orders Placed This Encounter   Procedures   • Diet Order Regular     Standing Status:   Standing     Number of Occurrences:   1     Order Specific Question:   Diet:     Answer:   Regular [1]       ACTIVITY  As tolerated.  Weight bearing as tolerated    CONSULTATIONS  gastroenterology    PROCEDURES  10/03: EGD    LABORATORY  Lab Results   Component Value Date    SODIUM 139 10/04/2018    POTASSIUM 3.6 10/04/2018    CHLORIDE 103 10/04/2018    CO2 30 10/04/2018    GLUCOSE 131 (H) 10/04/2018    BUN 17 10/04/2018    CREATININE 1.06 10/04/2018    CREATININE 0.8 05/01/2009        Lab Results   Component Value Date    WBC 5.3 10/05/2018    HEMOGLOBIN 11.1 (L) 10/05/2018    HEMATOCRIT 35.1 (L) 10/05/2018    PLATELETCT 163 (L) 10/05/2018        Total time of the discharge process exceeds 35 minutes.

## 2018-10-05 NOTE — PROGRESS NOTES
Assumed care at 0700. Bedside report received from Laurence. Patient's chart and MAR reviewed. Pt complains of continued abdominal pain at this time.  Abd is soft, tender without rebound tenderness.  Pt is A & O 4. Patient was updated on plan of care for the day. Questions answered and concerns addressed.  Pt denies any additional needs at this time. White board updated. Call light, phone and personal belongings within reach.

## 2018-10-05 NOTE — CARE PLAN
Problem: Safety  Goal: Will remain free from injury  Outcome: PROGRESSING AS EXPECTED  Bed in low position; call bell within reach; wheels locked; education provided to notify RN prior to exiting bed

## 2018-10-05 NOTE — CARE PLAN
Problem: Smoking Cessation  Goal: Ability to identify changes in lifestyle to reduce recurrence of condition will improve  Outcome: PROGRESSING AS EXPECTED  Education provided about smoking cessation to ensure understanding.

## 2018-10-05 NOTE — DISCHARGE INSTRUCTIONS
Discharge Instructions    Discharged to home by taxi with self. Discharged via wheelchair, hospital escort: Refused.  Special equipment needed: Not Applicable    Be sure to schedule a follow-up appointment with your primary care doctor or any specialists as instructed.     Discharge Plan:   Diet Plan: Discussed  Activity Level: Discussed  Smoking Cessation Offered: Patient Counseled  Confirmed Follow up Appointment: Appointment Scheduled  Confirmed Symptoms Management: Discussed  Medication Reconciliation Updated: Yes  Influenza Vaccine Indication: Indicated: 9 to 64 years of age    I understand that a diet low in cholesterol, fat, and sodium is recommended for good health. Unless I have been given specific instructions below for another diet, I accept this instruction as my diet prescription.   Other diet:   Heart-Healthy Eating Plan  Introduction  Heart-healthy meal planning includes:  · Limiting unhealthy fats.  · Increasing healthy fats.  · Making other small dietary changes.  You may need to talk with your doctor or a diet specialist (dietitian) to create an eating plan that is right for you.  What types of fat should I choose?  · Choose healthy fats. These include olive oil and canola oil, flaxseeds, walnuts, almonds, and seeds.  · Eat more omega-3 fats. These include salmon, mackerel, sardines, tuna, flaxseed oil, and ground flaxseeds. Try to eat fish at least twice each week.  · Limit saturated fats.  ¨ Saturated fats are often found in animal products, such as meats, butter, and cream.  ¨ Plant sources of saturated fats include palm oil, palm kernel oil, and coconut oil.  · Avoid foods with partially hydrogenated oils in them. These include stick margarine, some tub margarines, cookies, crackers, and other baked goods. These contain trans fats.  What general guidelines do I need to follow?  · Check food labels carefully. Identify foods with trans fats or high amounts of saturated fat.  · Fill one half of  "your plate with vegetables and green salads. Eat 4-5 servings of vegetables per day. A serving of vegetables is:  ¨ 1 cup of raw leafy vegetables.  ¨ ½ cup of raw or cooked cut-up vegetables.  ¨ ½ cup of vegetable juice.  · Fill one fourth of your plate with whole grains. Look for the word \"whole\" as the first word in the ingredient list.  · Fill one fourth of your plate with lean protein foods.  · Eat 4-5 servings of fruit per day. A serving of fruit is:  ¨ One medium whole fruit.  ¨ ¼ cup of dried fruit.  ¨ ½ cup of fresh, frozen, or canned fruit.  ¨ ½ cup of 100% fruit juice.  · Eat more foods that contain soluble fiber. These include apples, broccoli, carrots, beans, peas, and barley. Try to get 20-30 g of fiber per day.  · Eat more home-cooked food. Eat less restaurant, buffet, and fast food.  · Limit or avoid alcohol.  · Limit foods high in starch and sugar.  · Avoid fried foods.  · Avoid frying your food. Try baking, boiling, grilling, or broiling it instead. You can also reduce fat by:  ¨ Removing the skin from poultry.  ¨ Removing all visible fats from meats.  ¨ Skimming the fat off of stews, soups, and gravies before serving them.  ¨ Steaming vegetables in water or broth.  · Lose weight if you are overweight.  · Eat 4-5 servings of nuts, legumes, and seeds per week:  ¨ One serving of dried beans or legumes equals ½ cup after being cooked.  ¨ One serving of nuts equals 1½ ounces.  ¨ One serving of seeds equals ½ ounce or one tablespoon.  · You may need to keep track of how much salt or sodium you eat. This is especially true if you have high blood pressure. Talk with your doctor or dietitian to get more information.  What foods can I eat?  Grains   Breads, including Citizen of [place], white, fiorella, wheat, raisin, rye, oatmeal, and Italian. Tortillas that are neither fried nor made with lard or trans fat. Low-fat rolls, including hotdog and hamburger buns and English muffins. Biscuits. Muffins. Waffles. Pancakes. Light " popcorn. Whole-grain cereals. Flatbread. Vanessa toast. Pretzels. Breadsticks. Rusks. Low-fat snacks. Low-fat crackers, including oyster, saltine, matzo, jessica, animal, and rye. Rice and pasta, including brown rice and pastas that are made with whole wheat.  Vegetables   All vegetables.  Fruits   All fruits, but limit coconut.  Meats and Other Protein Sources   Lean, well-trimmed beef, veal, pork, and lamb. Chicken and turkey without skin. All fish and shellfish. Wild duck, rabbit, pheasant, and venison. Egg whites or low-cholesterol egg substitutes. Dried beans, peas, lentils, and tofu. Seeds and most nuts.  Dairy   Low-fat or nonfat cheeses, including ricotta, string, and mozzarella. Skim or 1% milk that is liquid, powdered, or evaporated. Buttermilk that is made with low-fat milk. Nonfat or low-fat yogurt.  Beverages   Mineral water. Diet carbonated beverages.  Sweets and Desserts   Sherbets and fruit ices. Honey, jam, marmalade, jelly, and syrups. Meringues and gelatins. Pure sugar candy, such as hard candy, jelly beans, gumdrops, mints, marshmallows, and small amounts of dark chocolate. Harley food cake.  Eat all sweets and desserts in moderation.  Fats and Oils   Nonhydrogenated (trans-free) margarines. Vegetable oils, including soybean, sesame, sunflower, olive, peanut, safflower, corn, canola, and cottonseed. Salad dressings or mayonnaise made with a vegetable oil. Limit added fats and oils that you use for cooking, baking, salads, and as spreads.  Other   Cocoa powder. Coffee and tea. All seasonings and condiments.  The items listed above may not be a complete list of recommended foods or beverages. Contact your dietitian for more options.   What foods are not recommended?  Grains   Breads that are made with saturated or trans fats, oils, or whole milk. Croissants. Butter rolls. Cheese breads. Sweet rolls. Donuts. Buttered popcorn. Chow mein noodles. High-fat crackers, such as cheese or butter  crackers.  Meats and Other Protein Sources   Fatty meats, such as hotdogs, short ribs, sausage, spareribs, page, rib eye roast or steak, and mutton. High-fat deli meats, such as salami and bologna. Caviar. Domestic duck and goose. Organ meats, such as kidney, liver, sweetbreads, and heart.  Dairy   Cream, sour cream, cream cheese, and creamed cottage cheese. Whole-milk cheeses, including blue (yady), [place] Brett, Brie, Vic, American, Havarti, Swiss, cheddar, Camembert, and [place]. Whole or 2% milk that is liquid, evaporated, or condensed. Whole buttermilk. Cream sauce or high-fat cheese sauce. Yogurt that is made from whole milk.  Beverages   Regular sodas and juice drinks with added sugar.  Sweets and Desserts   Frosting. Pudding. Cookies. Cakes other than noah food cake. Candy that has milk chocolate or white chocolate, hydrogenated fat, butter, coconut, or unknown ingredients. Buttered syrups. Full-fat ice cream or ice cream drinks.  Fats and Oils   Gravy that has suet, meat fat, or shortening. Cocoa butter, hydrogenated oils, palm oil, coconut oil, palm kernel oil. These can often be found in baked products, candy, fried foods, nondairy creamers, and whipped toppings. Solid fats and shortenings, including page fat, salt pork, lard, and butter. Nondairy cream substitutes, such as coffee creamers and sour cream substitutes. Salad dressings that are made of unknown oils, cheese, or sour cream.  The items listed above may not be a complete list of foods and beverages to avoid. Contact your dietitian for more information.   This information is not intended to replace advice given to you by your health care provider. Make sure you discuss any questions you have with your health care provider.  Document Released: 06/18/2013 Document Revised: 05/25/2017 Document Reviewed: 06/11/2015  © 2017 Elsevier      Special Instructions: None    · Is patient discharged on Warfarin / Coumadin? NO       Gastrointestinal  Bleeding  Introduction  Gastrointestinal bleeding is bleeding somewhere along the path food travels through the body (digestive tract). This path is anywhere between the mouth and the opening of the butt (anus). You may have blood in your poop (stools) or have black poop. If you throw up (vomit), there may be blood in it.  This condition can be mild, serious, or even life-threatening. If you have a lot of bleeding, you may need to stay in the hospital.  Follow these instructions at home:  · Take over-the-counter and prescription medicines only as told by your doctor.  · Eat foods that have a lot of fiber in them. These foods include whole grains, fruits, and vegetables. You can also try eating 1-3 prunes each day.  · Drink enough fluid to keep your pee (urine) clear or pale yellow.  · Keep all follow-up visits as told by your doctor. This is important.  Contact a doctor if:  · Your symptoms do not get better.  Get help right away if:  · Your bleeding gets worse.  · You feel dizzy or you pass out (faint).  · You feel weak.  · You have very bad cramps in your back or belly (abdomen).  · You pass large clumps of blood (clots) in your poop.  · Your symptoms are getting worse.  This information is not intended to replace advice given to you by your health care provider. Make sure you discuss any questions you have with your health care provider.  Document Released: 09/26/2009 Document Revised: 05/25/2017 Document Reviewed: 06/06/2016  © 2017 Elsevier      Alcohol Use Disorder  Alcohol use disorder is when your drinking disrupts your daily life. When you have this condition, you drink too much alcohol and you cannot control your drinking.  Alcohol use disorder can cause serious problems with your physical health. It can affect your brain, heart, liver, pancreas, immune system, stomach, and intestines. Alcohol use disorder can increase your risk for certain cancers and cause problems with your mental health, such as  depression, anxiety, psychosis, delirium, and dementia. People with this disorder risk hurting themselves and others.  What are the causes?  This condition is caused by drinking too much alcohol over time. It is not caused by drinking too much alcohol only one or two times. Some people with this condition drink alcohol to cope with or escape from negative life events. Others drink to relieve pain or symptoms of mental illness.  What increases the risk?  You are more likely to develop this condition if:  · You have a family history of alcohol use disorder.  · Your culture encourages drinking to the point of intoxication, or makes alcohol easy to get.  · You had a mood or conduct disorder in childhood.  · You have been a victim of abuse.  · You are an adolescent and:  ¨ You have poor grades or difficulties in school.  ¨ Your caregivers do not talk to you about saying no to alcohol, or supervise your activities.  ¨ You are impulsive or you have trouble with self-control.  What are the signs or symptoms?  Symptoms of this condition include:  · Drinking more than you want to.  · Drinking for longer than you want to.  · Trying several times to drink less or to control your drinking.  · Spending a lot of time getting alcohol, drinking, or recovering from drinking.  · Craving alcohol.  · Having problems at work, at school, or at home due to drinking.  · Having problems in relationships due to drinking.  · Drinking when it is dangerous to drink, such as before driving a car.  · Continuing to drink even though you know you might have a physical or mental problem related to drinking.  · Needing more and more alcohol to get the same effect you want from the alcohol (building up tolerance).  · Having symptoms of withdrawal when you stop drinking. Symptoms of withdrawal include:  ¨ Fatigue.  ¨ Nightmares.  ¨ Trouble sleeping.  ¨ Depression.  ¨ Anxiety.  ¨ Fever.  ¨ Seizures.  ¨ Severe confusion.  ¨ Feeling or seeing things that  are not there (hallucinations).  ¨ Tremors.  ¨ Rapid heart rate.  ¨ Rapid breathing.  ¨ High blood pressure.  · Drinking to avoid symptoms of withdrawal.  How is this diagnosed?  This condition is diagnosed with an assessment. Your health care provider may start the assessment by asking three or four questions about your drinking.  Your health care provider may perform a physical exam or do lab tests to see if you have physical problems resulting from alcohol use. She or he may refer you to a mental health professional for evaluation.  How is this treated?  Some people with alcohol use disorder are able to reduce their alcohol use to low-risk levels. Others need to completely quit drinking alcohol. When necessary, mental health professionals with specialized training in substance use treatment can help. Your health care provider can help you decide how severe your alcohol use disorder is and what type of treatment you need. The following forms of treatment are available:  · Detoxification. Detoxification involves quitting drinking and using prescription medicines within the first week to help lessen withdrawal symptoms. This treatment is important for people who have had withdrawal symptoms before and for heavy drinkers who are likely to have withdrawal symptoms. Alcohol withdrawal can be dangerous, and in severe cases, it can cause death. Detoxification may be provided in a home, community, or primary care setting, or in a hospital or substance use treatment facility.  · Counseling. This treatment is also called talk therapy. It is provided by substance use treatment counselors. A counselor can address the reasons you use alcohol and suggest ways to keep you from drinking again or to prevent problem drinking. The goals of talk therapy are to:  ¨ Find healthy activities and ways for you to cope with stress.  ¨ Identify and avoid the things that trigger your alcohol use.  ¨ Help you learn how to handle  cravings.  · Medicines. Medicines can help treat alcohol use disorder by:  ¨ Decreasing alcohol cravings.  ¨ Decreasing the positive feeling you have when you drink alcohol.  ¨ Causing an uncomfortable physical reaction when you drink alcohol (aversion therapy).  · Support groups. Support groups are led by people who have quit drinking. They provide emotional support, advice, and guidance.  These forms of treatment are often combined. Some people with this condition benefit from a combination of treatments provided by specialized substance use treatment centers.  Follow these instructions at home:  · Take over-the-counter and prescription medicines only as told by your health care provider.  · Check with your health care provider before starting any new medicines.  · Ask friends and family members not to offer you alcohol.  · Avoid situations where alcohol is served, including gatherings where others are drinking alcohol.  · Create a plan for what to do when you are tempted to use alcohol.  · Find hobbies or activities that you enjoy that do not include alcohol.  · Keep all follow-up visits as told by your health care provider. This is important.  How is this prevented?  · If you drink, limit alcohol intake to no more than 1 drink a day for nonpregnant women and 2 drinks a day for men. One drink equals 12 oz of beer, 5 oz of wine, or 1½ oz of hard liquor.  · If you have a mental health condition, get treatment and support.  · Do not give alcohol to adolescents.  · If you are an adolescent:  ¨ Do not drink alcohol.  ¨ Do not be afraid to say no if someone offers you alcohol. Speak up about why you do not want to drink. You can be a positive role model for your friends and set a good example for those around you by not drinking alcohol.  ¨ If your friends drink, spend time with others who do not drink alcohol. Make new friends who do not use alcohol.  ¨ Find healthy ways to manage stress and emotions, such as  meditation or deep breathing, exercise, spending time in nature, listening to music, or talking with a trusted friend or family member.  Contact a health care provider if:  · You are not able to take your medicines as told.  · Your symptoms get worse.  · You return to drinking alcohol (relapse) and your symptoms get worse.  Get help right away if:  · You have thoughts about hurting yourself or others.  If you ever feel like you may hurt yourself or others, or have thoughts about taking your own life, get help right away. You can go to your nearest emergency department or call:  · Your local emergency services (911 in the [place]  · A suicide crisis helpline, such as the National Suicide Prevention Lifeline at 1-215.415.8487. This is open 24 hours a day.  Summary  · Alcohol use disorder is when your drinking disrupts your daily life. When you have this condition, you drink too much alcohol and you cannot control your drinking.  · Treatment may include detoxification, counseling, medicine, and support groups.  · Ask friends and family members not to offer you alcohol. Avoid situations where alcohol is served.  · Get help right away if you have thoughts about hurting yourself or others.  This information is not intended to replace advice given to you by your health care provider. Make sure you discuss any questions you have with your health care provider.  Document Released: 01/25/2006 Document Revised: 09/14/2017 Document Reviewed: 09/14/2017  [employer] Patient Education © 2017 [employer]    Omeprazole capsules (sprinkle caps) - Rx  What is this medicine?  OMEPRAZOLE (oh ME pray zol) prevents the production of acid in the stomach. It is used to treat gastroesophageal reflux disease (GERD), ulcers, certain bacteria in the stomach, inflammation of the esophagus, and Zollinger-Jack Syndrome. It is also used to treat other conditions that cause too much stomach acid.  This medicine may be used for other  purposes; ask your health care provider or pharmacist if you have questions.  COMMON BRAND NAME(S): Prilosec  What should I tell my health care provider before I take this medicine?  They need to know if you have any of these conditions:  -liver disease  -low levels of magnesium in the blood  -lupus  -an unusual or allergic reaction to omeprazole, other medicines, foods, dyes, or preservatives  -pregnant or trying to get pregnant  -breast-feeding  How should I use this medicine?  Take this medicine by mouth with a glass of water. Follow the directions on the prescription label. Do not crush, break or chew the capsules. They can be opened and the contents sprinkled on a small amount of applesauce or yogurt, given with fruit juices, or swallowed immediately with water. This medicine works best if taken on an empty stomach 30 to 60 minutes before breakfast. Take your doses at regular intervals. Do not take your medicine more often than directed.  Talk to your pediatrician regarding the use of this medicine in children. Special care may be needed.  Overdosage: If you think you have taken too much of this medicine contact a poison control center or emergency room at once.  NOTE: This medicine is only for you. Do not share this medicine with others.  What if I miss a dose?  If you miss a dose, take it as soon as you can. If it is almost time for your next dose, take only that dose. Do not take double or extra doses.  What may interact with this medicine?  Do not take this medicine with any of the following medications:  -atazanavir  -clopidogrel  -nelfinavir  This medicine may also interact with the following medications:  -ampicillin  -certain medicines for anxiety or sleep  -certain medicines that treat or prevent blood clots like warfarin  -cyclosporine  -diazepam  -digoxin  -disulfiram  -diuretics  -iron salts  -methotrexate  -mycophenolate mofetil  -phenytoin  -prescription medicine for fungal or yeast infection like  itraconazole, ketoconazole, voriconazole  -saquinavir  -tacrolimus  This list may not describe all possible interactions. Give your health care provider a list of all the medicines, herbs, non-prescription drugs, or dietary supplements you use. Also tell them if you smoke, drink alcohol, or use illegal drugs. Some items may interact with your medicine.  What should I watch for while using this medicine?  It can take several days before your stomach pain gets better. Check with your doctor or health care professional if your condition does not start to get better, or if it gets worse.  You may need blood work done while you are taking this medicine.  What side effects may I notice from receiving this medicine?  Side effects that you should report to your doctor or health care professional as soon as possible:  -allergic reactions like skin rash, itching or hives, swelling of the face, lips, or tongue  -bone, muscle or joint pain  -breathing problems  -chest pain or chest tightness  -dark yellow or brown urine  -dizziness  -fast, irregular heartbeat  -feeling faint or lightheaded  -fever or sore throat  -muscle spasm  -palpitations  -rash on cheeks or arms that gets worse in the sun  -redness, blistering, peeling or loosening of the skin, including inside the mouth  -seizures  -tremors  -unusual bleeding or bruising  -unusually weak or tired  -yellowing of the eyes or skin  Side effects that usually do not require medical attention (report to your doctor or health care professional if they continue or are bothersome):  -constipation  -diarrhea  -dry mouth  -headache  -nausea  This list may not describe all possible side effects. Call your doctor for medical advice about side effects. You may report side effects to FDA at 4-938-FDA-5878.  Where should I keep my medicine?  Keep out of the reach of children.  Store at room temperature between 15 and 30 degrees C (59 and 86 degrees F). Protect from light and moisture.  Throw away any unused medicine after the expiration date.  NOTE: This sheet is a summary. It may not cover all possible information. If you have questions about this medicine, talk to your doctor, pharmacist, or health care provider.  © 2018 [employer] (2017-01-19 12:18:47)    Sucralfate oral suspension  What is this medicine?  SUCRALFATE (DILSHAD edison fate) helps to treat ulcers of the intestine.  This medicine may be used for other purposes; ask your health care provider or pharmacist if you have questions.  COMMON BRAND NAME(S): Carafate  What should I tell my health care provider before I take this medicine?  They need to know if you have any of these conditions:  -kidney disease  -an unusual or allergic reaction to sucralfate, other medicines, foods, dyes, or preservatives  -pregnant or trying to get pregnant  -breast-feeding  How should I use this medicine?  Take this medicine by mouth with a glass of water. Follow the directions on the prescription label. Shake well before using. Use a specially marked spoon or container to measure your medicine. Ask your pharmacist if you do not have one. Household spoons are not accurate. This medicine works best if you take it on an empty stomach, 1 hour before meals. Take your doses at regular intervals. Do not take your medicine more often than directed. Do not stop taking except on your doctor's advice.  Talk to your pediatrician regarding the use of this medicine in children. Special care may be needed.  Overdosage: If you think you have taken too much of this medicine contact a poison control center or emergency room at once.  NOTE: This medicine is only for you. Do not share this medicine with others.  What if I miss a dose?  If you miss a dose, take it as soon as you can. If it is almost time for your next dose, take only that dose. Do not take double or extra doses.  What may interact with this  medicine?  -antacid  -cimetidine  -digoxin  -ketoconazole  -phenytoin  -quinidine  -ranitidine  -some antibiotics like ciprofloxacin, norfloxacin, and ofloxacin  -theophylline  -thyroid hormones  -warfarin  This list may not describe all possible interactions. Give your health care provider a list of all the medicines, herbs, non-prescription drugs, or dietary supplements you use. Also tell them if you smoke, drink alcohol, or use illegal drugs. Some items may interact with your medicine.  What should I watch for while using this medicine?  Visit your doctor or health care professional for regular check ups. Let your doctor know if your symptoms do not improve or if you feel worse.  Antacids should not be taken within one half hour before or after this medicine.  What side effects may I notice from receiving this medicine?  Side effects that you should report to your doctor or health care professional as soon as possible:  -allergic reactions like skin rash, itching or hives, swelling of the face, lips, or tongue  -difficulty breathing  Side effects that usually do not require medical attention (report to your doctor or health care professional if they continue or are bothersome):  -back pain  -constipation  -drowsy, dizzy  -dry mouth  -headache  -stomach upset, gas  -trouble sleeping  This list may not describe all possible side effects. Call your doctor for medical advice about side effects. You may report side effects to FDA at 0-236-FDA-7213.  Where should I keep my medicine?  Keep out of the reach of children.  Store at room temperature between 20 and 25 degrees C (68 and 77 degrees F). Keep container tightly closed. Throw away any unused medicine after the expiration date.  NOTE: This sheet is a summary. It may not cover all possible information. If you have questions about this medicine, talk to your doctor, pharmacist, or health care provider.  © 2018 Elsevier/Gold Standard (2009-08-19  15:47:18)    Duloxetine delayed-release capsules  What is this medicine?  DULOXETINE (doo LOX e teen) is used to treat depression, anxiety, and different types of chronic pain.  This medicine may be used for other purposes; ask your health care provider or pharmacist if you have questions.  COMMON BRAND NAME(S): Kvng Sin  What should I tell my health care provider before I take this medicine?  They need to know if you have any of these conditions:  -bipolar disorder or a family history of bipolar disorder  -glaucoma  -kidney disease  -liver disease  -suicidal thoughts or a previous suicide attempt  -taken medicines called MAOIs like Carbex, Eldepryl, Marplan, Nardil, and Parnate within 14 days  -an unusual reaction to duloxetine, other medicines, foods, dyes, or preservatives  -pregnant or trying to get pregnant  -breast-feeding  How should I use this medicine?  Take this medicine by mouth with a glass of water. Follow the directions on the prescription label. Do not cut, crush or chew this medicine. You can take this medicine with or without food. Take your medicine at regular intervals. Do not take your medicine more often than directed. Do not stop taking this medicine suddenly except upon the advice of your doctor. Stopping this medicine too quickly may cause serious side effects or your condition may worsen.  A special MedGuide will be given to you by the pharmacist with each prescription and refill. Be sure to read this information carefully each time.  Talk to your pediatrician regarding the use of this medicine in children. While this drug may be prescribed for children as young as 7 years of age for selected conditions, precautions do apply.  Overdosage: If you think you have taken too much of this medicine contact a poison control center or emergency room at once.  NOTE: This medicine is only for you. Do not share this medicine with others.  What if I miss a dose?  If you miss a dose, take it as  soon as you can. If it is almost time for your next dose, take only that dose. Do not take double or extra doses.  What may interact with this medicine?  Do not take this medicine with any of the following medications:  -desvenlafaxine  -levomilnacipran  -linezolid  -MAOIs like Carbex, Eldepryl, Marplan, Nardil, and Parnate  -methylene blue (injected into a vein)  -milnacipran  -thioridazine  -venlafaxine  This medicine may also interact with the following medications:  -alcohol  -amphetamines  -aspirin and aspirin-like medicines  -certain antibiotics like ciprofloxacin and enoxacin  -certain medicines for blood pressure, heart disease, irregular heart beat  -certain medicines for depression, anxiety, or psychotic disturbances  -certain medicines for migraine headache like almotriptan, eletriptan, frovatriptan, naratriptan, rizatriptan, sumatriptan, zolmitriptan  -certain medicines that treat or prevent blood clots like warfarin, enoxaparin, and dalteparin  -cimetidine  -fentanyl  -lithium  -NSAIDS, medicines for pain and inflammation, like ibuprofen or naproxen  -phentermine  -procarbazine  -rasagiline  -sibutramine  -Verplanck's wort  -theophylline  -tramadol  -tryptophan  This list may not describe all possible interactions. Give your health care provider a list of all the medicines, herbs, non-prescription drugs, or dietary supplements you use. Also tell them if you smoke, drink alcohol, or use illegal drugs. Some items may interact with your medicine.  What should I watch for while using this medicine?  Tell your doctor if your symptoms do not get better or if they get worse. Visit your doctor or health care professional for regular checks on your progress. Because it may take several weeks to see the full effects of this medicine, it is important to continue your treatment as prescribed by your doctor.  Patients and their families should watch out for new or worsening thoughts of suicide or depression. Also  watch out for sudden changes in feelings such as feeling anxious, agitated, panicky, irritable, hostile, aggressive, impulsive, severely restless, overly excited and hyperactive, or not being able to sleep. If this happens, especially at the beginning of treatment or after a change in dose, call your health care professional.  You may get drowsy or dizzy. Do not drive, use machinery, or do anything that needs mental alertness until you know how this medicine affects you. Do not stand or sit up quickly, especially if you are an older patient. This reduces the risk of dizzy or fainting spells. Alcohol may interfere with the effect of this medicine. Avoid alcoholic drinks.  This medicine can cause an increase in blood pressure. This medicine can also cause a sudden drop in your blood pressure, which may make you feel faint and increase the chance of a fall. These effects are most common when you first start the medicine or when the dose is increased, or during use of other medicines that can cause a sudden drop in blood pressure. Check with your doctor for instructions on monitoring your blood pressure while taking this medicine.  Your mouth may get dry. Chewing sugarless gum or sucking hard candy, and drinking plenty of water may help. Contact your doctor if the problem does not go away or is severe.  What side effects may I notice from receiving this medicine?  Side effects that you should report to your doctor or health care professional as soon as possible:  -allergic reactions like skin rash, itching or hives, swelling of the face, lips, or tongue  -anxious  -breathing problems  -confusion  -changes in vision  -chest pain  -confusion  -elevated mood, decreased need for sleep, racing thoughts, impulsive behavior  -eye pain  -fast, irregular heartbeat  -feeling faint or lightheaded, falls  -feeling agitated, angry, or irritable  -hallucination, loss of contact with reality  -high blood pressure  -loss of balance or  coordination  -palpitations  -redness, blistering, peeling or loosening of the skin, including inside the mouth  -restlessness, pacing, inability to keep still  -seizures  -stiff muscles  -suicidal thoughts or other mood changes  -trouble passing urine or change in the amount of urine  -trouble sleeping  -unusual bleeding or bruising  -unusually weak or tired  -vomiting  -yellowing of the eyes or skin  Side effects that usually do not require medical attention (report to your doctor or health care professional if they continue or are bothersome):  -change in sex drive or performance  -change in appetite or weight  -constipation  -dizziness  -dry mouth  -headache  -increased sweating  -nausea  -tired  This list may not describe all possible side effects. Call your doctor for medical advice about side effects. You may report side effects to FDA at 3-495-FDA-3458.  Where should I keep my medicine?  Keep out of the reach of children.  Store at room temperature between 20 and 25 degrees C (68 to 77 degrees F). Throw away any unused medicine after the expiration date.  NOTE: This sheet is a summary. It may not cover all possible information. If you have questions about this medicine, talk to your doctor, pharmacist, or health care provider.  © 2018 Elsevier/Gold Standard (2017-05-18 18:16:03)    Depression / Suicide Risk    As you are discharged from this [facility] facility, it is important to learn how to keep safe from harming yourself.    Recognize the warning signs:  · Abrupt changes in personality, positive or negative- including increase in energy   · Giving away possessions  · Change in eating patterns- significant weight changes-  positive or negative  · Change in sleeping patterns- unable to sleep or sleeping all the time   · Unwillingness or inability to communicate  · Depression  · Unusual sadness, discouragement and loneliness  · Talk of wanting to die  · Neglect of personal appearance   · Rebelliousness-  reckless behavior  · Withdrawal from people/activities they love  · Confusion- inability to concentrate     If you or a loved one observes any of these behaviors or has concerns about self-harm, here's what you can do:  · Talk about it- your feelings and reasons for harming yourself  · Remove any means that you might use to hurt yourself (examples: pills, rope, extension cords, firearm)  · Get professional help from the community (Mental Health, Substance Abuse, psychological counseling)  · Do not be alone:Call your Safe Contact- someone whom you trust who will be there for you.  · Call your local CRISIS HOTLINE 655-7361 or 284-965-5162  · Call your local Children's Mobile Crisis Response Team [place] (044) 359-3636 or www.Sprout  · Call the toll free National Suicide Prevention Hotlines   · National Suicide Prevention Lifeline 439-986-UMXL (0541)  · National Hope Line Network 800-SUICIDE (728-0254)

## 2018-10-05 NOTE — CARE PLAN
Problem: Communication  Goal: The ability to communicate needs accurately and effectively will improve  Outcome: PROGRESSING AS EXPECTED  Encouraged communication; listen carefully; education provided about support systems

## 2018-10-05 NOTE — PROGRESS NOTES
Pt dc'd at 1412. IV and monitor removed; monitor room notified. Pt left unit via wheelchair with CNA. Personal belongings with pt when leaving unit. Pt given discharge instructions prior to leaving unit including prescription and when to visit with physician; verbalizes understanding. Copy of discharge instructions with pt and in the chart.

## 2018-10-06 ENCOUNTER — PATIENT OUTREACH (OUTPATIENT)
Dept: HEALTH INFORMATION MANAGEMENT | Facility: OTHER | Age: 58
End: 2018-10-06

## 2018-10-06 NOTE — PROGRESS NOTES
Chart reviewed.  Pt was discharged from [facility] 10/5/18 and does not have a contact phone number listed in Epic record.  Discharge outreach letter mailed to pt.

## 2018-10-06 NOTE — LETTER
Ashleigh Marquis  [address] [place] [address]    October 6, 2018      Dear Ashleigh Marquis,    [facility] wants to ensure your discharge home is safe and you or your loved ones have had all of your questions answered regarding your care after you leave the hospital.    Our discharge team was unsuccessful in our attempts to contact you telephonically and we wanted to be sure that you had a list of resources and contact information should you have any questions regarding your hospital stay, follow-up instructions, or active medical symptoms.    Questions or Concerns Regarding… Contact   Medical Questions Related to Your Discharge  (7 days a week, 8am-5pm) Contact a Nurse Care Coordinator   [phone]   Medical Questions Not Related to Your Discharge  (24 hours a day / 7 days a week)  Contact the Nurse Health Line   [phone]    Medications or Discharge Instructions Refer to your discharge packet   or contact your -[phone]   Follow-up Appointment(s) Schedule your appointment via [employer]   or contact Scheduling [phone]   Billing Review your statement via [employer]  or contact Billing [phone]   Medical Records Review your records via [employer]   or contact Medical Records [phone]     You can also easily access your medical information, test results and upcoming appointments via the [employer] free online health management tool. You can learn more and sign up at [employer]. For assistance setting up your [employer] account, please call [phone].    Once again, we want to ensure your discharge home is safe and that you have a clear understanding of any next steps in your care. If you have any questions or concerns, please do not hesitate to contact us, we are here for you. Thank you for choosing [facility] for your healthcare needs.    Sincerely,      Your [facility] Healthcare Team

## 2018-10-07 ENCOUNTER — HOSPITAL ENCOUNTER (EMERGENCY)
Facility: MEDICAL CENTER | Age: 58
End: 2018-10-07
Attending: EMERGENCY MEDICINE
Payer: MEDICAID

## 2018-10-07 ENCOUNTER — HOSPITAL ENCOUNTER (EMERGENCY)
Dept: HOSPITAL 8 - ED | Age: 58
LOS: 1 days | Discharge: HOME | End: 2018-10-08
Payer: MEDICAID

## 2018-10-07 VITALS — BODY MASS INDEX: 30.12 KG/M2 | HEIGHT: 66 IN | WEIGHT: 187.39 LBS

## 2018-10-07 VITALS
WEIGHT: 180 LBS | HEART RATE: 86 BPM | RESPIRATION RATE: 16 BRPM | TEMPERATURE: 97.4 F | BODY MASS INDEX: 28.93 KG/M2 | OXYGEN SATURATION: 91 % | DIASTOLIC BLOOD PRESSURE: 91 MMHG | SYSTOLIC BLOOD PRESSURE: 127 MMHG | HEIGHT: 66 IN

## 2018-10-07 VITALS — DIASTOLIC BLOOD PRESSURE: 64 MMHG | SYSTOLIC BLOOD PRESSURE: 100 MMHG

## 2018-10-07 DIAGNOSIS — I80.9 THROMBOPHLEBITIS: ICD-10-CM

## 2018-10-07 DIAGNOSIS — I10: ICD-10-CM

## 2018-10-07 DIAGNOSIS — F43.10: ICD-10-CM

## 2018-10-07 DIAGNOSIS — K29.01: ICD-10-CM

## 2018-10-07 DIAGNOSIS — F10.220: ICD-10-CM

## 2018-10-07 DIAGNOSIS — K21.9: ICD-10-CM

## 2018-10-07 DIAGNOSIS — K29.21: Primary | ICD-10-CM

## 2018-10-07 LAB
ALBUMIN SERPL-MCNC: 3 G/DL (ref 3.4–5)
ALP SERPL-CCNC: 153 U/L (ref 45–117)
ALT SERPL-CCNC: 22 U/L (ref 12–78)
ANION GAP SERPL CALC-SCNC: 12 MMOL/L (ref 5–15)
BASOPHILS # BLD AUTO: 0.04 X10^3/UL (ref 0–0.1)
BASOPHILS NFR BLD AUTO: 1 % (ref 0–1)
BILIRUB SERPL-MCNC: < 0.1 MG/DL (ref 0.2–1)
CALCIUM SERPL-MCNC: 8.8 MG/DL (ref 8.5–10.1)
CHLORIDE SERPL-SCNC: 116 MMOL/L (ref 98–107)
CREAT SERPL-MCNC: 0.8 MG/DL (ref 0.55–1.02)
EOSINOPHIL # BLD AUTO: 0.1 X10^3/UL (ref 0–0.4)
EOSINOPHIL NFR BLD AUTO: 2 % (ref 1–7)
ERYTHROCYTE [DISTWIDTH] IN BLOOD BY AUTOMATED COUNT: 20.2 % (ref 9.6–15.2)
LYMPHOCYTES # BLD AUTO: 2.07 X10^3/UL (ref 1–3.4)
LYMPHOCYTES NFR BLD AUTO: 37 % (ref 22–44)
MCH RBC QN AUTO: 29.7 PG (ref 27–34.8)
MCHC RBC AUTO-ENTMCNC: 33.1 G/DL (ref 32.4–35.8)
MCV RBC AUTO: 89.8 FL (ref 80–100)
MD: NO
MONOCYTES # BLD AUTO: 0.74 X10^3/UL (ref 0.2–0.8)
MONOCYTES NFR BLD AUTO: 13 % (ref 2–9)
NEUTROPHILS # BLD AUTO: 2.71 X10^3/UL (ref 1.8–6.8)
NEUTROPHILS NFR BLD AUTO: 48 % (ref 42–75)
PLATELET # BLD AUTO: 203 X10^3/UL (ref 130–400)
PMV BLD AUTO: 8.6 FL (ref 7.4–10.4)
PROT SERPL-MCNC: 7.1 G/DL (ref 6.4–8.2)
RBC # BLD AUTO: 4.06 X10^6/UL (ref 3.82–5.3)

## 2018-10-07 PROCEDURE — 99285 EMERGENCY DEPT VISIT HI MDM: CPT

## 2018-10-07 PROCEDURE — 83690 ASSAY OF LIPASE: CPT

## 2018-10-07 PROCEDURE — 85025 COMPLETE CBC W/AUTO DIFF WBC: CPT

## 2018-10-07 PROCEDURE — 99284 EMERGENCY DEPT VISIT MOD MDM: CPT

## 2018-10-07 PROCEDURE — 80053 COMPREHEN METABOLIC PANEL: CPT

## 2018-10-07 PROCEDURE — C9113 INJ PANTOPRAZOLE SODIUM, VIA: HCPCS

## 2018-10-07 PROCEDURE — 96374 THER/PROPH/DIAG INJ IV PUSH: CPT

## 2018-10-07 PROCEDURE — 36415 COLL VENOUS BLD VENIPUNCTURE: CPT

## 2018-10-07 ASSESSMENT — LIFESTYLE VARIABLES
HAVE PEOPLE ANNOYED YOU BY CRITICIZING YOUR DRINKING: NO
AVERAGE NUMBER OF DAYS PER WEEK YOU HAVE A DRINK CONTAINING ALCOHOL: 7
EVER FELT BAD OR GUILTY ABOUT YOUR DRINKING: NO
CONSUMPTION TOTAL: POSITIVE
HOW MANY TIMES IN THE PAST YEAR HAVE YOU HAD 5 OR MORE DRINKS IN A DAY: 7
EVER HAD A DRINK FIRST THING IN THE MORNING TO STEADY YOUR NERVES TO GET RID OF A HANGOVER: NO
TOTAL SCORE: 0
TOTAL SCORE: 0
ON A TYPICAL DAY WHEN YOU DRINK ALCOHOL HOW MANY DRINKS DO YOU HAVE: 7
TOTAL SCORE: 0
DO YOU DRINK ALCOHOL: YES
HAVE YOU EVER FELT YOU SHOULD CUT DOWN ON YOUR DRINKING: NO

## 2018-10-07 ASSESSMENT — PAIN DESCRIPTION - DESCRIPTORS: DESCRIPTORS: ACHING

## 2018-10-07 ASSESSMENT — PAIN SCALES - GENERAL
PAINLEVEL_OUTOF10: 8
PAINLEVEL_OUTOF10: 8
PAINLEVEL_OUTOF10: 5
PAINLEVEL_OUTOF10: 5

## 2018-10-07 NOTE — ED PROVIDER NOTES
"ER Provider Note     Scribed for Joseph Grace M.D. by Miguel Khan. 10/7/2018, 3:56 AM.    Means of Arrival: EMS   History obtained from: Patient  History limited by: Alcohol intoxication    CHIEF COMPLAINT  Chief Complaint   Patient presents with   • Abdominal Pain   • Alcohol Intoxication       HPI  Ashleigh Marquis is a 57 y.o. female who presents to the Emergency Department complaining of neck pain and nausea. Patient was seen before at the ED for chronic abdominal pain that has diminished from her last visit but is still persistent. Today she is more concerned about her neck pain.    History is limited secondary to patient's level of alcohol intoxication.    REVIEW OF SYSTEMS  See HPI for further details. ROS is limited secondary to patient's level of alcohol intoxication.    PAST MEDICAL HISTORY   has a past medical history of Alcoholism ([facility]); Anxiety; Arthritis; ASTHMA; Cancer ([facility]) (1981); Chronic low back pain; Congestive heart failure ([facility]); Depression; EtOH dependence ([facility]); Fall; GERD (gastroesophageal reflux disease); HTN; Hypertension; Indigestion; Muscle disorder; OSTEOPOROSIS; Other specified symptom associated with female genital organs; Psychiatric disorder; PTSD (post-traumatic stress disorder); Renal disorder; Seizure ([facility]); Ulcer; and Vitamin D deficiency.    SURGICAL HISTORY   has a past surgical history that includes hernia repair (1977); cysto stent placemnt pre surg (10/7/2010); cystoscopy stent placement (11/9/2010); ureteroscopy (11/9/2010); lasertripsy (11/9/2010); stent removal (11/9/2010); and gastroscopy-endo (N/A, 10/3/2018).    SOCIAL HISTORY  Social History   Substance Use Topics   • Smoking status: Current Every Day Smoker     Packs/day: 0.50     Years: 20.00     Types: Cigarettes   • Smokeless tobacco: Never Used      Comment: 1/2 pack per day   • Alcohol use Yes      Comment: \"lots of vodka\" currently intoxicated 4-5 pints of vodka daily      History   Drug Use No " "      FAMILY HISTORY  Family History   Problem Relation Age of Onset   • Heart Disease Father    • Hypertension Father    • Diabetes Father    • Cancer Paternal Grandmother    • Depression Other    • Lung Disease Neg Hx    • Stroke Neg Hx        CURRENT MEDICATIONS  Home Medications     Reviewed by Dexter Mccartney R.N. (Registered Nurse) on 10/07/18 at 0123  Med List Status: Unable to Obtain   Medication Last Dose Status   acetaminophen (TYLENOL) 325 MG Tab  Active   DULoxetine (CYMBALTA) 20 MG Cap DR Particles  Active   folic acid (FOLVITE) 1 MG Tab  Active   omeprazole (PRILOSEC) 40 MG delayed-release capsule  Active   sucralfate (CARAFATE) 1 GM/10ML Suspension  Active   thiamine (THIAMINE) 100 MG tablet  Active                ALLERGIES  Allergies   Allergen Reactions   • Sulfa Drugs Vomiting   • Toradol Vomiting   • Gabapentin      Bowel incontinence     • Amoxicillin Rash     Reported by NAIDA on arrival to ED    Pt states she takes PCN 10/3       PHYSICAL EXAM  VITAL SIGNS: /96   Pulse 86   Temp 36.2 °C (97.2 °F)   Resp 16   Ht 1.676 m (5' 6\")   Wt 81.6 kg (180 lb)   LMP 11/02/2015   SpO2 91%   BMI 29.05 kg/m²      Constitutional: Slurred speech, intoxicated  HENT: No signs of trauma, Bilateral external ears normal, Nose normal.   Eyes: Pupils are equal and reactive, Conjunctiva normal, Non-icteric.   Neck: Superficial bump in left neck with no surrounding redness or swelling. Normal range of motion, No tenderness, Supple, No stridor.   Lymphatic: No lymphadenopathy noted.   Cardiovascular: Regular rate and rhythm, no murmurs.   Thorax & Lungs: Normal breath sounds, No respiratory distress, No wheezing, No chest tenderness.   Abdomen: Bowel sounds normal, Soft, No tenderness, No masses, No pulsatile masses. No peritoneal signs.  Skin: Warm, Dry, No erythema, No rash.   Back: No bony tenderness, No CVA tenderness.   Extremities: Intact distal pulses, No edema, No tenderness, No " cyanosis.  Musculoskeletal: Good range of motion in all major joints. No tenderness to palpation or major deformities noted.       COURSE & MEDICAL DECISION MAKING  Pertinent Labs & Imaging studies reviewed. (See chart for details)    This is a 57 y.o. female that presents with what appears to be thrombophlebitis of her left neck where in external jugular IV was placed.  The patient had complained of chronic abdominal pain however there is no acute symptoms today.  I will p.o. trial her to assure that she is able to tolerate oral intake.  She had a CT abd scan done on 20 September the was normal.  Labs are all done at that time and are also normal.  I will instruct the patient on how to care for her thrombophlebitis of her neck and then discharge when she is clinically sober..     3:56 AM - Patient seen and examined at bedside.     5:51 AM-the patient is able to tolerate oral intake at this time.  She was instructed how to care for her thrombophlebitis.  Plan will be to discharge the patient.    6:08 AM-the patient is now clinically sober.  I will discharge the patient home with strict return precautions.        FINAL IMPRESSION  1. Thrombophlebitis          Miguel GAN (Cayetano), am scribing for, and in the presence of, Joseph Grace M.D..    Electronically signed by: Miguel Khan (Cayetano), 10/7/2018    Joseph GAN M.D. personally performed the services described in this documentation, as scribed by Miguel Khan in my presence, and it is both accurate and complete. C.    The note accurately reflects work and decisions made by me.  Joseph Grace  10/7/2018  6:08 AM

## 2018-10-07 NOTE — ED TRIAGE NOTES
Pt reassessed in waiting room, pt was sleeping in a wheel chair with her belongings next to her, pt woke up to verbal stimuli [place], pt reoriented to situation and place

## 2018-10-07 NOTE — ED TRIAGE NOTES
Chief Complaint   Patient presents with   • Abdominal Pain   • Alcohol Intoxication     Pt brought in by ambulance to triage, pt reports 1 day history of abdominal pain on the right lower quadrant with an episode of vomiting and diarrhea, pt denies dysuria or any vaginal discharge. Pt smells of alcohol and appears unkempt.

## 2018-10-07 NOTE — ED TRIAGE NOTES
Pt reassessed in waiting area, pt sleeping in wheelchair she wakes up when repositioned, no change in patient  assessment

## 2018-10-07 NOTE — ED TRIAGE NOTES
Pt brought in by ambulance to triage, pt reports 1 day history of abdominal pain with an episode of vomiting and diarrhea  Pt states she is homeless, she was difficult to arouse in the waiting area   Respirations even and unlabored, pt refusing to answer questions in triage  Pt fell asleep during triage, she awakes to verbal   Pt placed in waiting room in a wheel chair

## 2018-10-08 ENCOUNTER — HOSPITAL ENCOUNTER (EMERGENCY)
Dept: HOSPITAL 8 - ED | Age: 58
Discharge: HOME | End: 2018-10-08
Payer: MEDICAID

## 2018-10-08 VITALS — HEIGHT: 67 IN | BODY MASS INDEX: 34.6 KG/M2 | WEIGHT: 220.46 LBS

## 2018-10-08 VITALS — SYSTOLIC BLOOD PRESSURE: 114 MMHG | DIASTOLIC BLOOD PRESSURE: 51 MMHG

## 2018-10-08 DIAGNOSIS — F32.9: ICD-10-CM

## 2018-10-08 DIAGNOSIS — F17.200: ICD-10-CM

## 2018-10-08 DIAGNOSIS — M25.562: Primary | ICD-10-CM

## 2018-10-08 DIAGNOSIS — I10: ICD-10-CM

## 2018-10-08 DIAGNOSIS — Z72.9: ICD-10-CM

## 2018-10-08 DIAGNOSIS — K21.9: ICD-10-CM

## 2018-10-08 DIAGNOSIS — F10.120: ICD-10-CM

## 2018-10-08 PROCEDURE — 80307 DRUG TEST PRSMV CHEM ANLYZR: CPT

## 2018-10-08 PROCEDURE — 36415 COLL VENOUS BLD VENIPUNCTURE: CPT

## 2018-10-08 PROCEDURE — 99285 EMERGENCY DEPT VISIT HI MDM: CPT

## 2018-10-17 ENCOUNTER — HOSPITAL ENCOUNTER (EMERGENCY)
Facility: MEDICAL CENTER | Age: 58
End: 2018-10-17
Payer: MEDICAID

## 2018-10-19 ENCOUNTER — HOSPITAL ENCOUNTER (EMERGENCY)
Facility: MEDICAL CENTER | Age: 58
End: 2018-10-20
Attending: EMERGENCY MEDICINE
Payer: MEDICAID

## 2018-10-19 DIAGNOSIS — K92.1 BLOOD IN STOOL: ICD-10-CM

## 2018-10-19 DIAGNOSIS — E87.6 HYPOKALEMIA: ICD-10-CM

## 2018-10-19 DIAGNOSIS — F10.920 ALCOHOLIC INTOXICATION WITHOUT COMPLICATION (HCC): ICD-10-CM

## 2018-10-19 LAB
ALBUMIN SERPL BCP-MCNC: 3.8 G/DL (ref 3.2–4.9)
ALBUMIN/GLOB SERPL: 1 G/DL
ALP SERPL-CCNC: 148 U/L (ref 30–99)
ALT SERPL-CCNC: 25 U/L (ref 2–50)
ANION GAP SERPL CALC-SCNC: 14 MMOL/L (ref 0–11.9)
AST SERPL-CCNC: 48 U/L (ref 12–45)
BASOPHILS # BLD AUTO: 1.9 % (ref 0–1.8)
BASOPHILS # BLD: 0.1 K/UL (ref 0–0.12)
BILIRUB SERPL-MCNC: 0.3 MG/DL (ref 0.1–1.5)
BUN SERPL-MCNC: 15 MG/DL (ref 8–22)
CALCIUM SERPL-MCNC: 9.6 MG/DL (ref 8.5–10.5)
CHLORIDE SERPL-SCNC: 104 MMOL/L (ref 96–112)
CO2 SERPL-SCNC: 25 MMOL/L (ref 20–33)
CREAT SERPL-MCNC: 0.87 MG/DL (ref 0.5–1.4)
EOSINOPHIL # BLD AUTO: 0.12 K/UL (ref 0–0.51)
EOSINOPHIL NFR BLD: 2.2 % (ref 0–6.9)
ERYTHROCYTE [DISTWIDTH] IN BLOOD BY AUTOMATED COUNT: 60.6 FL (ref 35.9–50)
GLOBULIN SER CALC-MCNC: 3.7 G/DL (ref 1.9–3.5)
GLUCOSE SERPL-MCNC: 93 MG/DL (ref 65–99)
HCT VFR BLD AUTO: 40.5 % (ref 37–47)
HGB BLD-MCNC: 12.7 G/DL (ref 12–16)
IMM GRANULOCYTES # BLD AUTO: 0.01 K/UL (ref 0–0.11)
IMM GRANULOCYTES NFR BLD AUTO: 0.2 % (ref 0–0.9)
INR PPP: 0.96 (ref 0.87–1.13)
LYMPHOCYTES # BLD AUTO: 1.75 K/UL (ref 1–4.8)
LYMPHOCYTES NFR BLD: 32.4 % (ref 22–41)
MCH RBC QN AUTO: 28.5 PG (ref 27–33)
MCHC RBC AUTO-ENTMCNC: 31.4 G/DL (ref 33.6–35)
MCV RBC AUTO: 91 FL (ref 81.4–97.8)
MONOCYTES # BLD AUTO: 0.48 K/UL (ref 0–0.85)
MONOCYTES NFR BLD AUTO: 8.9 % (ref 0–13.4)
NEUTROPHILS # BLD AUTO: 2.94 K/UL (ref 2–7.15)
NEUTROPHILS NFR BLD: 54.4 % (ref 44–72)
NRBC # BLD AUTO: 0 K/UL
NRBC BLD-RTO: 0 /100 WBC
PLATELET # BLD AUTO: 289 K/UL (ref 164–446)
PMV BLD AUTO: 9.9 FL (ref 9–12.9)
POTASSIUM SERPL-SCNC: 2.9 MMOL/L (ref 3.6–5.5)
PROT SERPL-MCNC: 7.5 G/DL (ref 6–8.2)
PROTHROMBIN TIME: 12.8 SEC (ref 12–14.6)
RBC # BLD AUTO: 4.45 M/UL (ref 4.2–5.4)
SODIUM SERPL-SCNC: 143 MMOL/L (ref 135–145)
WBC # BLD AUTO: 5.4 K/UL (ref 4.8–10.8)

## 2018-10-19 PROCEDURE — 700111 HCHG RX REV CODE 636 W/ 250 OVERRIDE (IP): Performed by: EMERGENCY MEDICINE

## 2018-10-19 PROCEDURE — 82272 OCCULT BLD FECES 1-3 TESTS: CPT

## 2018-10-19 PROCEDURE — 700101 HCHG RX REV CODE 250: Performed by: EMERGENCY MEDICINE

## 2018-10-19 PROCEDURE — 85025 COMPLETE CBC W/AUTO DIFF WBC: CPT

## 2018-10-19 PROCEDURE — 99285 EMERGENCY DEPT VISIT HI MDM: CPT

## 2018-10-19 PROCEDURE — 96367 TX/PROPH/DG ADDL SEQ IV INF: CPT

## 2018-10-19 PROCEDURE — 96365 THER/PROPH/DIAG IV INF INIT: CPT

## 2018-10-19 PROCEDURE — 36415 COLL VENOUS BLD VENIPUNCTURE: CPT

## 2018-10-19 PROCEDURE — 85610 PROTHROMBIN TIME: CPT

## 2018-10-19 PROCEDURE — 80053 COMPREHEN METABOLIC PANEL: CPT

## 2018-10-19 RX ORDER — POTASSIUM CHLORIDE 7.45 MG/ML
10 INJECTION INTRAVENOUS ONCE
Status: COMPLETED | OUTPATIENT
Start: 2018-10-19 | End: 2018-10-19

## 2018-10-19 RX ORDER — POTASSIUM CHLORIDE 20 MEQ/1
20 TABLET, EXTENDED RELEASE ORAL ONCE
Status: COMPLETED | OUTPATIENT
Start: 2018-10-19 | End: 2018-10-20

## 2018-10-19 RX ORDER — POTASSIUM CHLORIDE 20 MEQ/1
20 TABLET, EXTENDED RELEASE ORAL 2 TIMES DAILY
Qty: 20 TAB | Refills: 0 | Status: SHIPPED | OUTPATIENT
Start: 2018-10-19 | End: 2019-03-10

## 2018-10-19 RX ADMIN — THIAMINE HYDROCHLORIDE: 100 INJECTION, SOLUTION INTRAMUSCULAR; INTRAVENOUS at 20:47

## 2018-10-19 RX ADMIN — POTASSIUM CHLORIDE 10 MEQ: 7.46 INJECTION, SOLUTION INTRAVENOUS at 19:44

## 2018-10-19 NOTE — ED TRIAGE NOTES
Pt arrived via [facility] with c/o bloody stools for 1 week and requesting detox. Pt reportedly drank approx a 5th of vodka today. Pt arrived obviously very intoxicated.

## 2018-10-19 NOTE — ED PROVIDER NOTES
"ED Provider Note    CHIEF COMPLAINT  Chief Complaint   Patient presents with   • Alcohol Intoxication   • Bloody Stools     Patient is intoxicated and unable to give me a history    HPI  Ashleigh Marquis is a 57 y.o. female who presents via [facility] complaining of bloody stools for 1 week and requesting detox.    Further history is unobtainable as patient is arousable but nonverbal on my exam.      ALLERGIES  Allergies   Allergen Reactions   • Sulfa Drugs Vomiting   • Toradol Vomiting   • Gabapentin      Bowel incontinence     • Amoxicillin Rash     Reported by [facility] on arrival to ED    Pt states she takes PCN 10/3       CURRENT MEDICATIONS  Unknown    PAST MEDICAL HISTORY   has a past medical history of Alcoholism ([facility]); Anxiety; Arthritis; ASTHMA; Cancer ([facility]) (1981); Chronic low back pain; Congestive heart failure ([facility]); Depression; EtOH dependence ([facility]); Fall; GERD (gastroesophageal reflux disease); HTN; Hypertension; Indigestion; Muscle disorder; OSTEOPOROSIS; Other specified symptom associated with female genital organs; Psychiatric disorder; PTSD (post-traumatic stress disorder); Renal disorder; Seizure ([facility]); Ulcer; and Vitamin D deficiency.    SURGICAL HISTORY   has a past surgical history that includes hernia repair (1977); cysto stent placemnt pre surg (10/7/2010); cystoscopy stent placement (11/9/2010); ureteroscopy (11/9/2010); lasertripsy (11/9/2010); stent removal (11/9/2010); and gastroscopy-endo (N/A, 10/3/2018).    SOCIAL HISTORY  Social History     Social History Main Topics   • Smoking status: Current Every Day Smoker     Packs/day: 0.50     Years: 20.00     Types: Cigarettes   • Smokeless tobacco: Never Used      Comment: 1/2 pack per day   • Alcohol use Yes      Comment: \"lots of vodka\" currently intoxicated 4-5 pints of vodka daily   • Drug use: No   • Sexual activity: No       Family Hx:  Unobtainable as patient is intoxicated      REVIEW OF SYSTEMS  Unobtainable as patient is " "intoxicated    PHYSICAL EXAM  VITAL SIGNS: Temp 36.6 °C (97.9 °F)   Ht 1.651 m (5' 5\")   Wt 81.6 kg (180 lb)   LMP 11/02/2015   BMI 29.95 kg/m²     General:  WDWN, nontoxic appearing in NAD; smells of alcohol; responds to painful stimulation with sternal rub; V/S as above; afebrile, not tachycardic  Skin: warm and dry; good color; no rash  HEENT: NCAT; pupils 2-3 mm equal; no scleral icterus   Neck: Soft, no meningismus, no LAD  Cardiovascular: Regular heart rate and rhythm.  No murmurs, rubs, or gallops; pulses 2+ bilaterally radially and DP areas  Lungs: Clear to auscultation with good air movement bilaterally.  No wheezes, rhonchi, or rales.   Abdomen: BS present; soft; NTND; no rebound, guarding, or rigidity.  No organomegaly or pulsatile mass  Extremities: DELGADO x 4; no e/o trauma; no pedal edema  Neurologic: Sleepy, arousable, nonverbal  Psychiatric: Unable to assess    LABS  Results for orders placed or performed during the hospital encounter of 10/19/18   CBC WITH DIFFERENTIAL   Result Value Ref Range    WBC 5.4 4.8 - 10.8 K/uL    RBC 4.45 4.20 - 5.40 M/uL    Hemoglobin 12.7 12.0 - 16.0 g/dL    Hematocrit 40.5 37.0 - 47.0 %    MCV 91.0 81.4 - 97.8 fL    MCH 28.5 27.0 - 33.0 pg    MCHC 31.4 (L) 33.6 - 35.0 g/dL    RDW 60.6 (H) 35.9 - 50.0 fL    Platelet Count 289 164 - 446 K/uL    MPV 9.9 9.0 - 12.9 fL    Neutrophils-Polys 54.40 44.00 - 72.00 %    Lymphocytes 32.40 22.00 - 41.00 %    Monocytes 8.90 0.00 - 13.40 %    Eosinophils 2.20 0.00 - 6.90 %    Basophils 1.90 (H) 0.00 - 1.80 %    Immature Granulocytes 0.20 0.00 - 0.90 %    Nucleated RBC 0.00 /100 WBC    Neutrophils (Absolute) 2.94 2.00 - 7.15 K/uL    Lymphs (Absolute) 1.75 1.00 - 4.80 K/uL    Monos (Absolute) 0.48 0.00 - 0.85 K/uL    Eos (Absolute) 0.12 0.00 - 0.51 K/uL    Baso (Absolute) 0.10 0.00 - 0.12 K/uL    Immature Granulocytes (abs) 0.01 0.00 - 0.11 K/uL    NRBC (Absolute) 0.00 K/uL   CMP   Result Value Ref Range    Sodium 143 135 - 145 mmol/L "    Potassium 2.9 (L) 3.6 - 5.5 mmol/L    Chloride 104 96 - 112 mmol/L    Co2 25 20 - 33 mmol/L    Anion Gap 14.0 (H) 0.0 - 11.9    Glucose 93 65 - 99 mg/dL    Bun 15 8 - 22 mg/dL    Creatinine 0.87 0.50 - 1.40 mg/dL    Calcium 9.6 8.5 - 10.5 mg/dL    AST(SGOT) 48 (H) 12 - 45 U/L    ALT(SGPT) 25 2 - 50 U/L    Alkaline Phosphatase 148 (H) 30 - 99 U/L    Total Bilirubin 0.3 0.1 - 1.5 mg/dL    Albumin 3.8 3.2 - 4.9 g/dL    Total Protein 7.5 6.0 - 8.2 g/dL    Globulin 3.7 (H) 1.9 - 3.5 g/dL    A-G Ratio 1.0 g/dL   PROTHROMBIN TIME   Result Value Ref Range    PT 12.8 12.0 - 14.6 sec    INR 0.96 0.87 - 1.13   ESTIMATED GFR   Result Value Ref Range    GFR If African American >60 >60 mL/min/1.73 m 2    GFR If Non African American >60 >60 mL/min/1.73 m 2     MEDICAL RECORD  I have reviewed the patient's medical record and pertinent results as listed.      COURSE & MEDICAL DECISION MAKING  I have reviewed any medical record information, laboratory studies and radiographic results as noted.    Ashleigh Marquis is a 57 y.o. female     Patient comes to the ER today with reports of bloody stool and requesting detox.  Patient is intoxicated and unable to give more information.  She has a soft, nontender abdomen on exam.  Blood pressure was noted to be in the 90s systolic but patient was not tachycardic and she was asleep.  This may be alcohol related.  I do not suspect sepsis.    Labs demonstrate hemoglobin of 12.7 and a hematocrit of 40.5, both higher than her last visit on 10/5.  Patient's potassium is low at 2.9 and she has an anion gap of 14, AST 48, alk phos 148.    Detox bag was ordered along with potassium replacement.    Patient will be observed for sobriety by the oncoming [facility].  She may be discharged if she has no episodes of hematochezia or melena.    6:05 PM  Patient signed out to Dr. Andrade who will assess for sobriety.  A prescription for potassium was written.      FINAL IMPRESSION  1. Alcoholic intoxication  without complication (HCC)    2. Hypokalemia    3. Blood in stool        Electronically signed by: Haley Bourgeois, 10/19/2018 4:07 PM

## 2018-10-20 VITALS
WEIGHT: 180 LBS | BODY MASS INDEX: 29.99 KG/M2 | HEART RATE: 101 BPM | OXYGEN SATURATION: 95 % | TEMPERATURE: 97.9 F | HEIGHT: 65 IN

## 2018-10-20 LAB
ANION GAP SERPL CALC-SCNC: 11 MMOL/L (ref 0–11.9)
BUN SERPL-MCNC: 13 MG/DL (ref 8–22)
CALCIUM SERPL-MCNC: 9.1 MG/DL (ref 8.5–10.5)
CHLORIDE SERPL-SCNC: 107 MMOL/L (ref 96–112)
CO2 SERPL-SCNC: 24 MMOL/L (ref 20–33)
CREAT SERPL-MCNC: 0.69 MG/DL (ref 0.5–1.4)
GLUCOSE SERPL-MCNC: 76 MG/DL (ref 65–99)
POC BREATHALIZER: 0.06 PERCENT (ref 0–0.01)
POTASSIUM SERPL-SCNC: 2.9 MMOL/L (ref 3.6–5.5)
SODIUM SERPL-SCNC: 142 MMOL/L (ref 135–145)

## 2018-10-20 PROCEDURE — A9270 NON-COVERED ITEM OR SERVICE: HCPCS | Performed by: EMERGENCY MEDICINE

## 2018-10-20 PROCEDURE — 700102 HCHG RX REV CODE 250 W/ 637 OVERRIDE(OP): Performed by: EMERGENCY MEDICINE

## 2018-10-20 PROCEDURE — 302970 POC BREATHALIZER: Performed by: EMERGENCY MEDICINE

## 2018-10-20 PROCEDURE — 80048 BASIC METABOLIC PNL TOTAL CA: CPT

## 2018-10-20 PROCEDURE — 82272 OCCULT BLD FECES 1-3 TESTS: CPT

## 2018-10-20 PROCEDURE — 96365 THER/PROPH/DIAG IV INF INIT: CPT

## 2018-10-20 PROCEDURE — 96367 TX/PROPH/DG ADDL SEQ IV INF: CPT

## 2018-10-20 PROCEDURE — 99285 EMERGENCY DEPT VISIT HI MDM: CPT

## 2018-10-20 RX ORDER — CHLORDIAZEPOXIDE HYDROCHLORIDE 25 MG/1
25 CAPSULE, GELATIN COATED ORAL ONCE
Status: COMPLETED | OUTPATIENT
Start: 2018-10-20 | End: 2018-10-20

## 2018-10-20 RX ORDER — LORAZEPAM 1 MG/1
2 TABLET ORAL ONCE
Status: COMPLETED | OUTPATIENT
Start: 2018-10-20 | End: 2018-10-20

## 2018-10-20 RX ORDER — POTASSIUM CHLORIDE 20 MEQ/1
40 TABLET, EXTENDED RELEASE ORAL ONCE
Status: COMPLETED | OUTPATIENT
Start: 2018-10-20 | End: 2018-10-20

## 2018-10-20 RX ADMIN — POTASSIUM CHLORIDE 20 MEQ: 1500 TABLET, EXTENDED RELEASE ORAL at 00:11

## 2018-10-20 RX ADMIN — LORAZEPAM 2 MG: 1 TABLET ORAL at 05:34

## 2018-10-20 RX ADMIN — POTASSIUM CHLORIDE 40 MEQ: 1500 TABLET, EXTENDED RELEASE ORAL at 01:13

## 2018-10-20 RX ADMIN — CHLORDIAZEPOXIDE HYDROCHLORIDE 25 MG: 25 CAPSULE ORAL at 02:24

## 2018-10-20 NOTE — ED NOTES
Assumed care. Patient sleeping, rr even and nonlabored. Remains on 1L NC with SpO2 93%. K of 2.9- medicated per MAR. VSS, appears comfortable in NAD. Will CTM.

## 2018-10-20 NOTE — ED NOTES
"Repeat potassium drawn and sent per orders. Patient encouraged to ambulate. Patient refusing stating \"I'm too weak to walk, I can't do it.\" Will try again.   "

## 2018-10-20 NOTE — DISCHARGE PLANNING
Medical Social Work     SW received a call from the RN advising SW the pt wants detox resources. SW met with the pt and provided detox resources.     Plan: SW will remain available for pt support.

## 2018-10-20 NOTE — ED NOTES
Patient remains sleeping, rr even and nonlabored. Groans when attempting to wake, rolls over an dfalls asleep again. Appears in NAD, will CTM.

## 2018-10-20 NOTE — ED NOTES
"SW has given patient list of detox. Patient states \"I can't do this, I won't make it there I'm too sick.\" Process explained to patient.   "

## 2018-10-20 NOTE — ED PROVIDER NOTES
ED Follow-up  Patient is a 57-year-old female who came in for alcohol intoxication and bloody stools.  Her guaiac was noted to be negative and her hemoglobin was within normal limits.  She had no bloody bowel movements here in the emergency department and no episodes of bloody vomiting.  She was signed out to myself to follow-up clinical sobriety.  Upon reassessment patient's breathalyzer is 0.06.  She is able to ambulate with a normal narrow-based steady gait.  She is tolerating oral intake.  She is given resources for detox facilities and instructed on abstaining from alcohol.  Patient was also noted to be hypokalemic and this was treated with oral potassium.  Patient did require Ativan for some symptoms of nausea and vomiting which she reports are related to detox.  However she did not show any other clinical symptoms of detox she was not tremulous and her heart rate was within normal limits.  Therefore I felt she was safe for discharge.  Patient is given strict return precautions and discharged home in stable condition.    Irena Flores MD

## 2018-10-20 NOTE — ED NOTES
Patient sleeping, easily arousable. Ambulatory to bathroom with one assist. Patient slightly tremulous. Requesting detox. MD Flores made aware. This RN called SW, will see patient shortly. Medicated per MAR.

## 2018-10-20 NOTE — ED PROVIDER NOTES
ED Provider Note    Patient was turned over to me by Dr. Bourgeois pending sobriety.  Recheck patient's potassium was not increased therefore she was given a second dose of oral potassium.  She was also given some Librium for some shakiness and withdrawal feelings.  Patient is still unsteady on her feet at 2:20 AM therefore she will be turned over to continue to sober.  She will be turned over to Dr. Flores at 2:23 AM

## 2018-10-20 NOTE — ED NOTES
Patient awake and yelling out, incontinent of urine. Pt and bedding changed, remains cooperative. Will continue to monitor.

## 2018-10-20 NOTE — ED NOTES
MD Flores at bedside for reeval. Patient cleared for discharge with detox list. Bus pass provided by [facility]

## 2018-11-05 ENCOUNTER — HOSPITAL ENCOUNTER (EMERGENCY)
Dept: HOSPITAL 8 - ED | Age: 58
Discharge: HOME | End: 2018-11-05
Payer: MEDICAID

## 2018-11-05 VITALS — SYSTOLIC BLOOD PRESSURE: 142 MMHG | DIASTOLIC BLOOD PRESSURE: 93 MMHG

## 2018-11-05 VITALS — WEIGHT: 185.19 LBS | HEIGHT: 65 IN | BODY MASS INDEX: 30.85 KG/M2

## 2018-11-05 DIAGNOSIS — Z72.9: ICD-10-CM

## 2018-11-05 DIAGNOSIS — K21.9: ICD-10-CM

## 2018-11-05 DIAGNOSIS — F10.220: Primary | ICD-10-CM

## 2018-11-05 DIAGNOSIS — I10: ICD-10-CM

## 2018-11-05 DIAGNOSIS — R07.89: ICD-10-CM

## 2018-11-05 LAB
ALBUMIN SERPL-MCNC: 3 G/DL (ref 3.4–5)
ANION GAP SERPL CALC-SCNC: 10 MMOL/L (ref 5–15)
BASOPHILS # BLD AUTO: 0.04 X10^3/UL (ref 0–0.1)
BASOPHILS NFR BLD AUTO: 1 % (ref 0–1)
CALCIUM SERPL-MCNC: 8.3 MG/DL (ref 8.5–10.1)
CHLORIDE SERPL-SCNC: 112 MMOL/L (ref 98–107)
CREAT SERPL-MCNC: 0.74 MG/DL (ref 0.55–1.02)
EOSINOPHIL # BLD AUTO: 0.05 X10^3/UL (ref 0–0.4)
EOSINOPHIL NFR BLD AUTO: 1 % (ref 1–7)
ERYTHROCYTE [DISTWIDTH] IN BLOOD BY AUTOMATED COUNT: 19.5 % (ref 9.6–15.2)
LYMPHOCYTES # BLD AUTO: 1.85 X10^3/UL (ref 1–3.4)
LYMPHOCYTES NFR BLD AUTO: 37 % (ref 22–44)
MCH RBC QN AUTO: 28.6 PG (ref 27–34.8)
MCHC RBC AUTO-ENTMCNC: 32.2 G/DL (ref 32.4–35.8)
MCV RBC AUTO: 88.9 FL (ref 80–100)
MD: NO
MONOCYTES # BLD AUTO: 0.58 X10^3/UL (ref 0.2–0.8)
MONOCYTES NFR BLD AUTO: 12 % (ref 2–9)
NEUTROPHILS # BLD AUTO: 2.44 X10^3/UL (ref 1.8–6.8)
NEUTROPHILS NFR BLD AUTO: 49 % (ref 42–75)
PLATELET # BLD AUTO: 277 X10^3/UL (ref 130–400)
PMV BLD AUTO: 7.8 FL (ref 7.4–10.4)
RBC # BLD AUTO: 4.22 X10^6/UL (ref 3.82–5.3)
TROPONIN I SERPL-MCNC: < 0.015 NG/ML (ref 0–0.04)

## 2018-11-05 PROCEDURE — 71045 X-RAY EXAM CHEST 1 VIEW: CPT

## 2018-11-05 PROCEDURE — 84484 ASSAY OF TROPONIN QUANT: CPT

## 2018-11-05 PROCEDURE — 82040 ASSAY OF SERUM ALBUMIN: CPT

## 2018-11-05 PROCEDURE — 93005 ELECTROCARDIOGRAM TRACING: CPT

## 2018-11-05 PROCEDURE — 85025 COMPLETE CBC W/AUTO DIFF WBC: CPT

## 2018-11-05 PROCEDURE — 36415 COLL VENOUS BLD VENIPUNCTURE: CPT

## 2018-11-05 PROCEDURE — 96372 THER/PROPH/DIAG INJ SC/IM: CPT

## 2018-11-05 PROCEDURE — 99285 EMERGENCY DEPT VISIT HI MDM: CPT

## 2018-11-05 PROCEDURE — 80048 BASIC METABOLIC PNL TOTAL CA: CPT

## 2018-11-07 ENCOUNTER — HOSPITAL ENCOUNTER (EMERGENCY)
Dept: HOSPITAL 8 - ED | Age: 58
Discharge: HOME | End: 2018-11-07
Payer: MEDICAID

## 2018-11-07 VITALS — BODY MASS INDEX: 32.47 KG/M2 | WEIGHT: 194.89 LBS | HEIGHT: 65 IN

## 2018-11-07 VITALS — SYSTOLIC BLOOD PRESSURE: 132 MMHG | DIASTOLIC BLOOD PRESSURE: 80 MMHG

## 2018-11-07 DIAGNOSIS — K21.9: ICD-10-CM

## 2018-11-07 DIAGNOSIS — I95.9: ICD-10-CM

## 2018-11-07 DIAGNOSIS — Z71.9: ICD-10-CM

## 2018-11-07 DIAGNOSIS — Z59.0: ICD-10-CM

## 2018-11-07 DIAGNOSIS — F10.220: Primary | ICD-10-CM

## 2018-11-07 LAB
ALBUMIN SERPL-MCNC: 2.9 G/DL (ref 3.4–5)
ALP SERPL-CCNC: 182 U/L (ref 45–117)
ALT SERPL-CCNC: 35 U/L (ref 12–78)
ANION GAP SERPL CALC-SCNC: 12 MMOL/L (ref 5–15)
BASOPHILS # BLD AUTO: 0.04 X10^3/UL (ref 0–0.1)
BASOPHILS NFR BLD AUTO: 1 % (ref 0–1)
BILIRUB SERPL-MCNC: 0.2 MG/DL (ref 0.2–1)
CALCIUM SERPL-MCNC: 8.4 MG/DL (ref 8.5–10.1)
CHLORIDE SERPL-SCNC: 110 MMOL/L (ref 98–107)
CREAT SERPL-MCNC: 0.72 MG/DL (ref 0.55–1.02)
EOSINOPHIL # BLD AUTO: 0.07 X10^3/UL (ref 0–0.4)
EOSINOPHIL NFR BLD AUTO: 1 % (ref 1–7)
ERYTHROCYTE [DISTWIDTH] IN BLOOD BY AUTOMATED COUNT: 19.3 % (ref 9.6–15.2)
INR PPP: 0.98 (ref 0.93–1.1)
LYMPHOCYTES # BLD AUTO: 1.96 X10^3/UL (ref 1–3.4)
LYMPHOCYTES NFR BLD AUTO: 35 % (ref 22–44)
MCH RBC QN AUTO: 29.9 PG (ref 27–34.8)
MCHC RBC AUTO-ENTMCNC: 33.7 G/DL (ref 32.4–35.8)
MCV RBC AUTO: 88.7 FL (ref 80–100)
MD: NO
MONOCYTES # BLD AUTO: 0.43 X10^3/UL (ref 0.2–0.8)
MONOCYTES NFR BLD AUTO: 8 % (ref 2–9)
NEUTROPHILS # BLD AUTO: 3.17 X10^3/UL (ref 1.8–6.8)
NEUTROPHILS NFR BLD AUTO: 56 % (ref 42–75)
PLATELET # BLD AUTO: 289 X10^3/UL (ref 130–400)
PMV BLD AUTO: 7.8 FL (ref 7.4–10.4)
PROT SERPL-MCNC: 6.9 G/DL (ref 6.4–8.2)
PROTHROMBIN TIME: 10.2 SECONDS (ref 9.6–11.5)
RBC # BLD AUTO: 3.95 X10^6/UL (ref 3.82–5.3)

## 2018-11-07 PROCEDURE — 83690 ASSAY OF LIPASE: CPT

## 2018-11-07 PROCEDURE — 99284 EMERGENCY DEPT VISIT MOD MDM: CPT

## 2018-11-07 PROCEDURE — 80307 DRUG TEST PRSMV CHEM ANLYZR: CPT

## 2018-11-07 PROCEDURE — 85025 COMPLETE CBC W/AUTO DIFF WBC: CPT

## 2018-11-07 PROCEDURE — 36415 COLL VENOUS BLD VENIPUNCTURE: CPT

## 2018-11-07 PROCEDURE — 80053 COMPREHEN METABOLIC PANEL: CPT

## 2018-11-07 PROCEDURE — 85610 PROTHROMBIN TIME: CPT

## 2018-11-07 SDOH — ECONOMIC STABILITY - HOUSING INSECURITY: HOMELESSNESS: Z59.0

## 2018-11-12 ENCOUNTER — HOSPITAL ENCOUNTER (EMERGENCY)
Dept: HOSPITAL 8 - ED | Age: 58
Discharge: HOME | End: 2018-11-12
Payer: MEDICAID

## 2018-11-12 VITALS — HEIGHT: 65 IN | WEIGHT: 171.96 LBS | BODY MASS INDEX: 28.65 KG/M2

## 2018-11-12 VITALS — DIASTOLIC BLOOD PRESSURE: 71 MMHG | SYSTOLIC BLOOD PRESSURE: 108 MMHG

## 2018-11-12 DIAGNOSIS — F41.1: ICD-10-CM

## 2018-11-12 DIAGNOSIS — F15.10: ICD-10-CM

## 2018-11-12 DIAGNOSIS — F32.9: ICD-10-CM

## 2018-11-12 DIAGNOSIS — F10.221: Primary | ICD-10-CM

## 2018-11-12 DIAGNOSIS — K21.9: ICD-10-CM

## 2018-11-12 DIAGNOSIS — I10: ICD-10-CM

## 2018-11-12 DIAGNOSIS — Z72.9: ICD-10-CM

## 2018-11-12 PROCEDURE — 99283 EMERGENCY DEPT VISIT LOW MDM: CPT

## 2018-11-13 ENCOUNTER — HOSPITAL ENCOUNTER (EMERGENCY)
Dept: HOSPITAL 8 - ED | Age: 58
Discharge: HOME | End: 2018-11-13
Payer: MEDICAID

## 2018-11-13 VITALS — WEIGHT: 165.35 LBS | HEIGHT: 63 IN | BODY MASS INDEX: 29.3 KG/M2

## 2018-11-13 VITALS — DIASTOLIC BLOOD PRESSURE: 68 MMHG | SYSTOLIC BLOOD PRESSURE: 118 MMHG

## 2018-11-13 DIAGNOSIS — K21.9: ICD-10-CM

## 2018-11-13 DIAGNOSIS — I10: ICD-10-CM

## 2018-11-13 DIAGNOSIS — F43.10: ICD-10-CM

## 2018-11-13 DIAGNOSIS — F41.1: ICD-10-CM

## 2018-11-13 DIAGNOSIS — F32.9: ICD-10-CM

## 2018-11-13 DIAGNOSIS — F10.129: Primary | ICD-10-CM

## 2018-11-13 PROCEDURE — 99283 EMERGENCY DEPT VISIT LOW MDM: CPT

## 2018-11-23 ENCOUNTER — HOSPITAL ENCOUNTER (EMERGENCY)
Dept: HOSPITAL 8 - ED | Age: 58
Discharge: HOME | End: 2018-11-23
Payer: MEDICAID

## 2018-11-23 VITALS — HEIGHT: 65 IN | WEIGHT: 176.37 LBS | BODY MASS INDEX: 29.38 KG/M2

## 2018-11-23 VITALS — SYSTOLIC BLOOD PRESSURE: 106 MMHG | DIASTOLIC BLOOD PRESSURE: 60 MMHG

## 2018-11-23 DIAGNOSIS — E11.9: ICD-10-CM

## 2018-11-23 DIAGNOSIS — I10: ICD-10-CM

## 2018-11-23 DIAGNOSIS — K21.9: ICD-10-CM

## 2018-11-23 DIAGNOSIS — F10.120: Primary | ICD-10-CM

## 2018-11-23 DIAGNOSIS — F17.200: ICD-10-CM

## 2018-11-23 DIAGNOSIS — F32.9: ICD-10-CM

## 2018-11-23 DIAGNOSIS — R53.1: ICD-10-CM

## 2018-11-23 PROCEDURE — 99283 EMERGENCY DEPT VISIT LOW MDM: CPT

## 2018-11-23 PROCEDURE — 93005 ELECTROCARDIOGRAM TRACING: CPT

## 2018-11-24 ENCOUNTER — HOSPITAL ENCOUNTER (EMERGENCY)
Dept: HOSPITAL 8 - ED | Age: 58
Discharge: HOME | End: 2018-11-24
Payer: MEDICAID

## 2018-11-24 VITALS — WEIGHT: 198.42 LBS | HEIGHT: 65 IN | BODY MASS INDEX: 33.06 KG/M2

## 2018-11-24 VITALS — SYSTOLIC BLOOD PRESSURE: 114 MMHG | DIASTOLIC BLOOD PRESSURE: 76 MMHG

## 2018-11-24 DIAGNOSIS — I10: ICD-10-CM

## 2018-11-24 DIAGNOSIS — K21.9: ICD-10-CM

## 2018-11-24 DIAGNOSIS — E11.9: ICD-10-CM

## 2018-11-24 DIAGNOSIS — F10.229: Primary | ICD-10-CM

## 2018-11-24 DIAGNOSIS — R45.851: ICD-10-CM

## 2018-11-24 DIAGNOSIS — F32.9: ICD-10-CM

## 2018-11-24 DIAGNOSIS — F17.200: ICD-10-CM

## 2018-11-24 LAB
ALBUMIN SERPL-MCNC: 3 G/DL (ref 3.4–5)
ANION GAP SERPL CALC-SCNC: 13 MMOL/L (ref 5–15)
APAP SERPL-MCNC: < 2 MCG/ML (ref 10–30)
BASOPHILS # BLD AUTO: 0.08 X10^3/UL (ref 0–0.1)
BASOPHILS NFR BLD AUTO: 1 % (ref 0–1)
CALCIUM SERPL-MCNC: 7.8 MG/DL (ref 8.5–10.1)
CHLORIDE SERPL-SCNC: 109 MMOL/L (ref 98–107)
CREAT SERPL-MCNC: 0.64 MG/DL (ref 0.55–1.02)
EOSINOPHIL # BLD AUTO: 0.09 X10^3/UL (ref 0–0.4)
EOSINOPHIL NFR BLD AUTO: 1 % (ref 1–7)
ERYTHROCYTE [DISTWIDTH] IN BLOOD BY AUTOMATED COUNT: 20.4 % (ref 9.6–15.2)
LYMPHOCYTES # BLD AUTO: 1.63 X10^3/UL (ref 1–3.4)
LYMPHOCYTES NFR BLD AUTO: 26 % (ref 22–44)
MCH RBC QN AUTO: 29.6 PG (ref 27–34.8)
MCHC RBC AUTO-ENTMCNC: 32.7 G/DL (ref 32.4–35.8)
MCV RBC AUTO: 90.6 FL (ref 80–100)
MD: NO
MONOCYTES # BLD AUTO: 0.84 X10^3/UL (ref 0.2–0.8)
MONOCYTES NFR BLD AUTO: 13 % (ref 2–9)
NEUTROPHILS # BLD AUTO: 3.65 X10^3/UL (ref 1.8–6.8)
NEUTROPHILS NFR BLD AUTO: 58 % (ref 42–75)
PLATELET # BLD AUTO: 299 X10^3/UL (ref 130–400)
PMV BLD AUTO: 8.1 FL (ref 7.4–10.4)
RBC # BLD AUTO: 4.2 X10^6/UL (ref 3.82–5.3)
VANCOMYCIN TROUGH SERPL-MCNC: < 1.7 MG/DL (ref 2.8–20)

## 2018-11-24 PROCEDURE — 36415 COLL VENOUS BLD VENIPUNCTURE: CPT

## 2018-11-24 PROCEDURE — 80048 BASIC METABOLIC PNL TOTAL CA: CPT

## 2018-11-24 PROCEDURE — 82040 ASSAY OF SERUM ALBUMIN: CPT

## 2018-11-24 PROCEDURE — 99283 EMERGENCY DEPT VISIT LOW MDM: CPT

## 2018-11-24 PROCEDURE — 85025 COMPLETE CBC W/AUTO DIFF WBC: CPT

## 2018-11-24 PROCEDURE — G0480 DRUG TEST DEF 1-7 CLASSES: HCPCS

## 2018-11-24 PROCEDURE — 80307 DRUG TEST PRSMV CHEM ANLYZR: CPT

## 2018-11-24 PROCEDURE — 80329 ANALGESICS NON-OPIOID 1 OR 2: CPT

## 2018-11-25 ENCOUNTER — HOSPITAL ENCOUNTER (EMERGENCY)
Facility: MEDICAL CENTER | Age: 58
End: 2018-11-25
Payer: MEDICAID

## 2018-11-25 ENCOUNTER — HOSPITAL ENCOUNTER (EMERGENCY)
Dept: HOSPITAL 8 - ED | Age: 58
Discharge: HOME | End: 2018-11-25
Payer: MEDICAID

## 2018-11-25 VITALS — WEIGHT: 209.44 LBS | BODY MASS INDEX: 34.89 KG/M2 | HEIGHT: 65 IN

## 2018-11-25 VITALS — SYSTOLIC BLOOD PRESSURE: 130 MMHG | DIASTOLIC BLOOD PRESSURE: 86 MMHG

## 2018-11-25 DIAGNOSIS — E11.9: ICD-10-CM

## 2018-11-25 DIAGNOSIS — Z72.9: ICD-10-CM

## 2018-11-25 DIAGNOSIS — F10.229: Primary | ICD-10-CM

## 2018-11-25 PROCEDURE — 99283 EMERGENCY DEPT VISIT LOW MDM: CPT

## 2018-11-26 ENCOUNTER — HOSPITAL ENCOUNTER (EMERGENCY)
Dept: HOSPITAL 8 - ED | Age: 58
Discharge: HOME | End: 2018-11-26
Payer: MEDICAID

## 2018-11-26 DIAGNOSIS — F43.10: ICD-10-CM

## 2018-11-26 DIAGNOSIS — K21.9: ICD-10-CM

## 2018-11-26 DIAGNOSIS — F41.1: ICD-10-CM

## 2018-11-26 DIAGNOSIS — I10: ICD-10-CM

## 2018-11-26 DIAGNOSIS — E11.9: ICD-10-CM

## 2018-11-26 DIAGNOSIS — F10.129: Primary | ICD-10-CM

## 2018-11-26 PROCEDURE — 99283 EMERGENCY DEPT VISIT LOW MDM: CPT

## 2018-12-04 ENCOUNTER — HOSPITAL ENCOUNTER (EMERGENCY)
Dept: HOSPITAL 8 - ED | Age: 58
Discharge: HOME | End: 2018-12-04
Payer: MEDICAID

## 2018-12-04 VITALS — HEIGHT: 62 IN | BODY MASS INDEX: 36.51 KG/M2 | WEIGHT: 198.42 LBS

## 2018-12-04 VITALS — SYSTOLIC BLOOD PRESSURE: 113 MMHG | DIASTOLIC BLOOD PRESSURE: 70 MMHG

## 2018-12-04 DIAGNOSIS — E11.9: ICD-10-CM

## 2018-12-04 DIAGNOSIS — F32.9: ICD-10-CM

## 2018-12-04 DIAGNOSIS — J06.9: Primary | ICD-10-CM

## 2018-12-04 DIAGNOSIS — K21.9: ICD-10-CM

## 2018-12-04 DIAGNOSIS — M79.18: ICD-10-CM

## 2018-12-04 DIAGNOSIS — I10: ICD-10-CM

## 2018-12-04 PROCEDURE — 71045 X-RAY EXAM CHEST 1 VIEW: CPT

## 2018-12-04 PROCEDURE — 99283 EMERGENCY DEPT VISIT LOW MDM: CPT

## 2018-12-09 ENCOUNTER — APPOINTMENT (OUTPATIENT)
Dept: RADIOLOGY | Facility: MEDICAL CENTER | Age: 58
End: 2018-12-09
Attending: EMERGENCY MEDICINE
Payer: MEDICAID

## 2018-12-09 ENCOUNTER — HOSPITAL ENCOUNTER (EMERGENCY)
Facility: MEDICAL CENTER | Age: 58
End: 2018-12-09
Attending: EMERGENCY MEDICINE
Payer: MEDICAID

## 2018-12-09 VITALS
HEART RATE: 94 BPM | OXYGEN SATURATION: 91 % | TEMPERATURE: 97.7 F | WEIGHT: 190.48 LBS | BODY MASS INDEX: 31.7 KG/M2 | DIASTOLIC BLOOD PRESSURE: 74 MMHG | SYSTOLIC BLOOD PRESSURE: 164 MMHG | RESPIRATION RATE: 20 BRPM

## 2018-12-09 DIAGNOSIS — F10.10 ALCOHOL ABUSE: ICD-10-CM

## 2018-12-09 DIAGNOSIS — J06.9 VIRAL UPPER RESPIRATORY TRACT INFECTION: ICD-10-CM

## 2018-12-09 DIAGNOSIS — J40 BRONCHITIS: ICD-10-CM

## 2018-12-09 PROCEDURE — 71045 X-RAY EXAM CHEST 1 VIEW: CPT

## 2018-12-09 PROCEDURE — 700102 HCHG RX REV CODE 250 W/ 637 OVERRIDE(OP): Performed by: EMERGENCY MEDICINE

## 2018-12-09 PROCEDURE — 99285 EMERGENCY DEPT VISIT HI MDM: CPT

## 2018-12-09 PROCEDURE — A9270 NON-COVERED ITEM OR SERVICE: HCPCS | Performed by: EMERGENCY MEDICINE

## 2018-12-09 RX ORDER — GUAIFENESIN 600 MG/1
600 TABLET, EXTENDED RELEASE ORAL ONCE
Status: DISCONTINUED | OUTPATIENT
Start: 2018-12-09 | End: 2018-12-09 | Stop reason: HOSPADM

## 2018-12-09 RX ORDER — ALBUTEROL SULFATE 90 UG/1
2 AEROSOL, METERED RESPIRATORY (INHALATION) EVERY 6 HOURS PRN
Qty: 8.5 G | Refills: 0 | Status: SHIPPED | OUTPATIENT
Start: 2018-12-09 | End: 2018-12-09

## 2018-12-09 RX ORDER — ALBUTEROL SULFATE 90 UG/1
2 AEROSOL, METERED RESPIRATORY (INHALATION) ONCE
Status: COMPLETED | OUTPATIENT
Start: 2018-12-09 | End: 2018-12-09

## 2018-12-09 RX ORDER — PREDNISONE 20 MG/1
20 TABLET ORAL DAILY
Qty: 5 TAB | Refills: 0 | Status: SHIPPED | OUTPATIENT
Start: 2018-12-09 | End: 2018-12-14

## 2018-12-09 RX ORDER — ALBUTEROL SULFATE 90 UG/1
2 AEROSOL, METERED RESPIRATORY (INHALATION) EVERY 6 HOURS PRN
Qty: 8.5 G | Refills: 0 | Status: SHIPPED | OUTPATIENT
Start: 2018-12-09 | End: 2019-03-10

## 2018-12-09 RX ORDER — GUAIFENESIN 600 MG/1
600 TABLET, EXTENDED RELEASE ORAL EVERY 12 HOURS
Qty: 28 TAB | Refills: 0 | Status: SHIPPED | OUTPATIENT
Start: 2018-12-09 | End: 2018-12-09

## 2018-12-09 RX ORDER — GUAIFENESIN 600 MG/1
600 TABLET, EXTENDED RELEASE ORAL ONCE
Status: COMPLETED | OUTPATIENT
Start: 2018-12-09 | End: 2018-12-09

## 2018-12-09 RX ORDER — PREDNISONE 20 MG/1
20 TABLET ORAL DAILY
Qty: 5 TAB | Refills: 0 | Status: SHIPPED | OUTPATIENT
Start: 2018-12-09 | End: 2018-12-09

## 2018-12-09 RX ORDER — GUAIFENESIN 600 MG/1
600 TABLET, EXTENDED RELEASE ORAL EVERY 12 HOURS
Qty: 28 TAB | Refills: 0 | Status: SHIPPED | OUTPATIENT
Start: 2018-12-09 | End: 2019-03-10

## 2018-12-09 RX ADMIN — GUAIFENESIN 600 MG: 600 TABLET, EXTENDED RELEASE ORAL at 18:57

## 2018-12-09 RX ADMIN — ALBUTEROL SULFATE 2 PUFF: 90 AEROSOL, METERED RESPIRATORY (INHALATION) at 18:57

## 2018-12-09 ASSESSMENT — LIFESTYLE VARIABLES: DO YOU DRINK ALCOHOL: NO

## 2018-12-10 ENCOUNTER — HOSPITAL ENCOUNTER (EMERGENCY)
Dept: HOSPITAL 8 - ED | Age: 58
Discharge: HOME | End: 2018-12-10
Payer: MEDICAID

## 2018-12-10 VITALS — SYSTOLIC BLOOD PRESSURE: 146 MMHG | DIASTOLIC BLOOD PRESSURE: 80 MMHG

## 2018-12-10 VITALS — BODY MASS INDEX: 30.12 KG/M2 | HEIGHT: 65 IN | WEIGHT: 180.78 LBS

## 2018-12-10 DIAGNOSIS — I10: ICD-10-CM

## 2018-12-10 DIAGNOSIS — Z71.41: ICD-10-CM

## 2018-12-10 DIAGNOSIS — E87.6: ICD-10-CM

## 2018-12-10 DIAGNOSIS — F17.200: ICD-10-CM

## 2018-12-10 DIAGNOSIS — E11.9: ICD-10-CM

## 2018-12-10 DIAGNOSIS — K21.9: ICD-10-CM

## 2018-12-10 DIAGNOSIS — F10.220: Primary | ICD-10-CM

## 2018-12-10 LAB
ALBUMIN SERPL-MCNC: 3.2 G/DL (ref 3.4–5)
ALP SERPL-CCNC: 226 U/L (ref 45–117)
ALT SERPL-CCNC: 66 U/L (ref 12–78)
ANION GAP SERPL CALC-SCNC: 9 MMOL/L (ref 5–15)
BASOPHILS # BLD AUTO: 0.04 X10^3/UL (ref 0–0.1)
BASOPHILS NFR BLD AUTO: 1 % (ref 0–1)
BILIRUB SERPL-MCNC: 0.3 MG/DL (ref 0.2–1)
CALCIUM SERPL-MCNC: 8.2 MG/DL (ref 8.5–10.1)
CHLORIDE SERPL-SCNC: 105 MMOL/L (ref 98–107)
CREAT SERPL-MCNC: 0.7 MG/DL (ref 0.55–1.02)
EOSINOPHIL # BLD AUTO: 0.17 X10^3/UL (ref 0–0.4)
EOSINOPHIL NFR BLD AUTO: 2 % (ref 1–7)
ERYTHROCYTE [DISTWIDTH] IN BLOOD BY AUTOMATED COUNT: 19.3 % (ref 9.6–15.2)
LYMPHOCYTES # BLD AUTO: 1.77 X10^3/UL (ref 1–3.4)
LYMPHOCYTES NFR BLD AUTO: 24 % (ref 22–44)
MCH RBC QN AUTO: 29.9 PG (ref 27–34.8)
MCHC RBC AUTO-ENTMCNC: 33.5 G/DL (ref 32.4–35.8)
MCV RBC AUTO: 89.3 FL (ref 80–100)
MD: NO
MONOCYTES # BLD AUTO: 0.58 X10^3/UL (ref 0.2–0.8)
MONOCYTES NFR BLD AUTO: 8 % (ref 2–9)
NEUTROPHILS # BLD AUTO: 4.98 X10^3/UL (ref 1.8–6.8)
NEUTROPHILS NFR BLD AUTO: 66 % (ref 42–75)
PLATELET # BLD AUTO: 300 X10^3/UL (ref 130–400)
PMV BLD AUTO: 7.9 FL (ref 7.4–10.4)
PROT SERPL-MCNC: 7.7 G/DL (ref 6.4–8.2)
RBC # BLD AUTO: 4.36 X10^6/UL (ref 3.82–5.3)

## 2018-12-10 PROCEDURE — 99284 EMERGENCY DEPT VISIT MOD MDM: CPT

## 2018-12-10 PROCEDURE — 96372 THER/PROPH/DIAG INJ SC/IM: CPT

## 2018-12-10 PROCEDURE — 80307 DRUG TEST PRSMV CHEM ANLYZR: CPT

## 2018-12-10 PROCEDURE — 36415 COLL VENOUS BLD VENIPUNCTURE: CPT

## 2018-12-10 PROCEDURE — 85025 COMPLETE CBC W/AUTO DIFF WBC: CPT

## 2018-12-10 PROCEDURE — 80053 COMPREHEN METABOLIC PANEL: CPT

## 2018-12-10 NOTE — ED NOTES
Paper scripts provided, educated to follow up as needed, jory christyulated with steady gait to exit
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
## 2018-12-10 NOTE — ED NOTES
Pt arrived in room  [place]   Call light within reach and visible from nurses station.

## 2018-12-10 NOTE — ED NOTES
Pt educated readiness for d/c, educated to put on clothing and RN will be in with paperwork. Pt states understanding but does not move from stretcher, will re-assess. Pt declined further VS at this time

## 2018-12-10 NOTE — ED NOTES
First contact with patient, OOB attempting to ambulate to bathroom, unsteady gait, assisted by RN to and from bathroom, back to bed, side rails up, pt provided with PO per MD. Plan for sober and d/c

## 2018-12-10 NOTE — ED TRIAGE NOTES
.  Chief Complaint   Patient presents with   • Cold Symptoms   • Congestion     x 2 days green sputum   • Alcohol Intoxication     Unsteady on feet pt rambling at triage obvious intoxication pt homeless. C/o productive cough and cold x 2 days

## 2018-12-18 ENCOUNTER — HOSPITAL ENCOUNTER (EMERGENCY)
Dept: HOSPITAL 8 - ED | Age: 58
Discharge: HOME | End: 2018-12-18
Payer: MEDICAID

## 2018-12-18 VITALS — HEIGHT: 65 IN | WEIGHT: 194.67 LBS | BODY MASS INDEX: 32.43 KG/M2

## 2018-12-18 VITALS — SYSTOLIC BLOOD PRESSURE: 114 MMHG | DIASTOLIC BLOOD PRESSURE: 74 MMHG

## 2018-12-18 DIAGNOSIS — I10: ICD-10-CM

## 2018-12-18 DIAGNOSIS — J31.0: Primary | ICD-10-CM

## 2018-12-18 DIAGNOSIS — M79.10: ICD-10-CM

## 2018-12-18 DIAGNOSIS — E11.9: ICD-10-CM

## 2018-12-18 DIAGNOSIS — K21.9: ICD-10-CM

## 2018-12-18 PROCEDURE — 99284 EMERGENCY DEPT VISIT MOD MDM: CPT

## 2018-12-18 PROCEDURE — 36415 COLL VENOUS BLD VENIPUNCTURE: CPT

## 2018-12-18 PROCEDURE — 71046 X-RAY EXAM CHEST 2 VIEWS: CPT

## 2018-12-18 PROCEDURE — 80047 BASIC METABLC PNL IONIZED CA: CPT

## 2018-12-30 ENCOUNTER — HOSPITAL ENCOUNTER (EMERGENCY)
Dept: HOSPITAL 8 - ED | Age: 58
Discharge: HOME | End: 2018-12-30
Payer: MEDICAID

## 2018-12-30 VITALS — DIASTOLIC BLOOD PRESSURE: 80 MMHG | SYSTOLIC BLOOD PRESSURE: 113 MMHG

## 2018-12-30 VITALS — BODY MASS INDEX: 30.86 KG/M2 | WEIGHT: 180.78 LBS | HEIGHT: 64 IN

## 2018-12-30 DIAGNOSIS — F17.200: ICD-10-CM

## 2018-12-30 DIAGNOSIS — I10: ICD-10-CM

## 2018-12-30 DIAGNOSIS — F10.220: Primary | ICD-10-CM

## 2018-12-30 DIAGNOSIS — Z71.41: ICD-10-CM

## 2018-12-30 DIAGNOSIS — Z79.899: ICD-10-CM

## 2018-12-30 DIAGNOSIS — K21.9: ICD-10-CM

## 2018-12-30 DIAGNOSIS — E11.9: ICD-10-CM

## 2018-12-30 DIAGNOSIS — R79.1: ICD-10-CM

## 2018-12-30 LAB
ALBUMIN SERPL-MCNC: 3 G/DL (ref 3.4–5)
ALP SERPL-CCNC: 146 U/L (ref 45–117)
ALT SERPL-CCNC: 50 U/L (ref 12–78)
ANION GAP SERPL CALC-SCNC: 9 MMOL/L (ref 5–15)
APTT BLD: 27 SECONDS (ref 25–31)
BASOPHILS # BLD AUTO: 0.06 X10^3/UL (ref 0–0.1)
BASOPHILS NFR BLD AUTO: 1 % (ref 0–1)
BILIRUB SERPL-MCNC: 0.5 MG/DL (ref 0.2–1)
CALCIUM SERPL-MCNC: 8.1 MG/DL (ref 8.5–10.1)
CHLORIDE SERPL-SCNC: 107 MMOL/L (ref 98–107)
CREAT SERPL-MCNC: 0.67 MG/DL (ref 0.55–1.02)
EOSINOPHIL # BLD AUTO: 0.15 X10^3/UL (ref 0–0.4)
EOSINOPHIL NFR BLD AUTO: 3 % (ref 1–7)
ERYTHROCYTE [DISTWIDTH] IN BLOOD BY AUTOMATED COUNT: 19.2 % (ref 9.6–15.2)
INR PPP: 0.95 (ref 0.93–1.1)
LYMPHOCYTES # BLD AUTO: 1.42 X10^3/UL (ref 1–3.4)
LYMPHOCYTES NFR BLD AUTO: 30 % (ref 22–44)
MCH RBC QN AUTO: 30.7 PG (ref 27–34.8)
MCHC RBC AUTO-ENTMCNC: 34 G/DL (ref 32.4–35.8)
MCV RBC AUTO: 90.3 FL (ref 80–100)
MD: NO
MONOCYTES # BLD AUTO: 0.68 X10^3/UL (ref 0.2–0.8)
MONOCYTES NFR BLD AUTO: 14 % (ref 2–9)
NEUTROPHILS # BLD AUTO: 2.49 X10^3/UL (ref 1.8–6.8)
NEUTROPHILS NFR BLD AUTO: 52 % (ref 42–75)
PLATELET # BLD AUTO: 342 X10^3/UL (ref 130–400)
PMV BLD AUTO: 7.4 FL (ref 7.4–10.4)
PROT SERPL-MCNC: 7 G/DL (ref 6.4–8.2)
PROTHROMBIN TIME: 10.1 SECONDS (ref 9.6–11.5)
RBC # BLD AUTO: 4.01 X10^6/UL (ref 3.82–5.3)

## 2018-12-30 PROCEDURE — 85025 COMPLETE CBC W/AUTO DIFF WBC: CPT

## 2018-12-30 PROCEDURE — 83690 ASSAY OF LIPASE: CPT

## 2018-12-30 PROCEDURE — 80053 COMPREHEN METABOLIC PANEL: CPT

## 2018-12-30 PROCEDURE — 85730 THROMBOPLASTIN TIME PARTIAL: CPT

## 2018-12-30 PROCEDURE — 85610 PROTHROMBIN TIME: CPT

## 2018-12-30 PROCEDURE — 99283 EMERGENCY DEPT VISIT LOW MDM: CPT

## 2018-12-30 PROCEDURE — 82140 ASSAY OF AMMONIA: CPT

## 2018-12-30 PROCEDURE — 80307 DRUG TEST PRSMV CHEM ANLYZR: CPT

## 2018-12-30 PROCEDURE — 36415 COLL VENOUS BLD VENIPUNCTURE: CPT

## 2019-01-02 ENCOUNTER — HOSPITAL ENCOUNTER (EMERGENCY)
Facility: MEDICAL CENTER | Age: 59
End: 2019-01-02
Attending: EMERGENCY MEDICINE
Payer: MEDICAID

## 2019-01-02 VITALS
WEIGHT: 190 LBS | BODY MASS INDEX: 31.62 KG/M2 | RESPIRATION RATE: 18 BRPM | DIASTOLIC BLOOD PRESSURE: 72 MMHG | OXYGEN SATURATION: 96 % | SYSTOLIC BLOOD PRESSURE: 121 MMHG | HEART RATE: 79 BPM | TEMPERATURE: 97 F

## 2019-01-02 DIAGNOSIS — F10.920 ALCOHOLIC INTOXICATION WITHOUT COMPLICATION (HCC): ICD-10-CM

## 2019-01-02 DIAGNOSIS — K27.9 PEPTIC ULCERATION: ICD-10-CM

## 2019-01-02 DIAGNOSIS — K70.9 ALCOHOLIC LIVER DISEASE (HCC): ICD-10-CM

## 2019-01-02 DIAGNOSIS — F10.10 ALCOHOL ABUSE: ICD-10-CM

## 2019-01-02 DIAGNOSIS — K92.0 HEMATEMESIS WITH NAUSEA: ICD-10-CM

## 2019-01-02 DIAGNOSIS — R10.10 UPPER ABDOMINAL PAIN: ICD-10-CM

## 2019-01-02 LAB
ABO GROUP BLD: NORMAL
ALBUMIN SERPL BCP-MCNC: 3.6 G/DL (ref 3.2–4.9)
ALBUMIN/GLOB SERPL: 1 G/DL
ALP SERPL-CCNC: 148 U/L (ref 30–99)
ALT SERPL-CCNC: 45 U/L (ref 2–50)
ANION GAP SERPL CALC-SCNC: 15 MMOL/L (ref 0–11.9)
APPEARANCE UR: CLEAR
AST SERPL-CCNC: 133 U/L (ref 12–45)
BACTERIA #/AREA URNS HPF: NEGATIVE /HPF
BASOPHILS # BLD AUTO: 2.2 % (ref 0–1.8)
BASOPHILS # BLD: 0.12 K/UL (ref 0–0.12)
BILIRUB SERPL-MCNC: 0.4 MG/DL (ref 0.1–1.5)
BILIRUB UR QL STRIP.AUTO: NEGATIVE
BLD GP AB SCN SERPL QL: NORMAL
BUN SERPL-MCNC: 9 MG/DL (ref 8–22)
CALCIUM SERPL-MCNC: 8.7 MG/DL (ref 8.5–10.5)
CHLORIDE SERPL-SCNC: 105 MMOL/L (ref 96–112)
CO2 SERPL-SCNC: 21 MMOL/L (ref 20–33)
COLOR UR: YELLOW
CREAT SERPL-MCNC: 0.59 MG/DL (ref 0.5–1.4)
EOSINOPHIL # BLD AUTO: 0.08 K/UL (ref 0–0.51)
EOSINOPHIL NFR BLD: 1.5 % (ref 0–6.9)
EPI CELLS #/AREA URNS HPF: ABNORMAL /HPF
ERYTHROCYTE [DISTWIDTH] IN BLOOD BY AUTOMATED COUNT: 58.2 FL (ref 35.9–50)
ETHANOL BLD-MCNC: 0.32 G/DL
GLOBULIN SER CALC-MCNC: 3.5 G/DL (ref 1.9–3.5)
GLUCOSE SERPL-MCNC: 75 MG/DL (ref 65–99)
GLUCOSE UR STRIP.AUTO-MCNC: NEGATIVE MG/DL
HCT VFR BLD AUTO: 37.9 % (ref 37–47)
HGB BLD-MCNC: 12.3 G/DL (ref 12–16)
HYALINE CASTS #/AREA URNS LPF: ABNORMAL /LPF
IMM GRANULOCYTES # BLD AUTO: 0.02 K/UL (ref 0–0.11)
IMM GRANULOCYTES NFR BLD AUTO: 0.4 % (ref 0–0.9)
INR PPP: 0.9 (ref 0.87–1.13)
KETONES UR STRIP.AUTO-MCNC: NEGATIVE MG/DL
LEUKOCYTE ESTERASE UR QL STRIP.AUTO: ABNORMAL
LIPASE SERPL-CCNC: 56 U/L (ref 11–82)
LYMPHOCYTES # BLD AUTO: 1.28 K/UL (ref 1–4.8)
LYMPHOCYTES NFR BLD: 23.7 % (ref 22–41)
MCH RBC QN AUTO: 29.7 PG (ref 27–33)
MCHC RBC AUTO-ENTMCNC: 32.5 G/DL (ref 33.6–35)
MCV RBC AUTO: 91.5 FL (ref 81.4–97.8)
MICRO URNS: ABNORMAL
MONOCYTES # BLD AUTO: 0.83 K/UL (ref 0–0.85)
MONOCYTES NFR BLD AUTO: 15.3 % (ref 0–13.4)
NEUTROPHILS # BLD AUTO: 3.08 K/UL (ref 2–7.15)
NEUTROPHILS NFR BLD: 56.9 % (ref 44–72)
NITRITE UR QL STRIP.AUTO: NEGATIVE
NRBC # BLD AUTO: 0 K/UL
NRBC BLD-RTO: 0 /100 WBC
PH UR STRIP.AUTO: 6 [PH]
PLATELET # BLD AUTO: 275 K/UL (ref 164–446)
PMV BLD AUTO: 9.5 FL (ref 9–12.9)
POTASSIUM SERPL-SCNC: 3.8 MMOL/L (ref 3.6–5.5)
PROT SERPL-MCNC: 7.1 G/DL (ref 6–8.2)
PROT UR QL STRIP: NEGATIVE MG/DL
PROTHROMBIN TIME: 12.2 SEC (ref 12–14.6)
RBC # BLD AUTO: 4.14 M/UL (ref 4.2–5.4)
RBC # URNS HPF: ABNORMAL /HPF
RBC UR QL AUTO: NEGATIVE
RH BLD: NORMAL
SODIUM SERPL-SCNC: 141 MMOL/L (ref 135–145)
SP GR UR STRIP.AUTO: 1.01
UROBILINOGEN UR STRIP.AUTO-MCNC: 0.2 MG/DL
WBC # BLD AUTO: 5.4 K/UL (ref 4.8–10.8)
WBC #/AREA URNS HPF: ABNORMAL /HPF

## 2019-01-02 PROCEDURE — 36415 COLL VENOUS BLD VENIPUNCTURE: CPT

## 2019-01-02 PROCEDURE — 81001 URINALYSIS AUTO W/SCOPE: CPT | Mod: XU

## 2019-01-02 PROCEDURE — 80307 DRUG TEST PRSMV CHEM ANLYZR: CPT

## 2019-01-02 PROCEDURE — 85610 PROTHROMBIN TIME: CPT

## 2019-01-02 PROCEDURE — 85025 COMPLETE CBC W/AUTO DIFF WBC: CPT

## 2019-01-02 PROCEDURE — 700105 HCHG RX REV CODE 258: Performed by: EMERGENCY MEDICINE

## 2019-01-02 PROCEDURE — 86900 BLOOD TYPING SEROLOGIC ABO: CPT

## 2019-01-02 PROCEDURE — 80053 COMPREHEN METABOLIC PANEL: CPT

## 2019-01-02 PROCEDURE — 700111 HCHG RX REV CODE 636 W/ 250 OVERRIDE (IP): Performed by: EMERGENCY MEDICINE

## 2019-01-02 PROCEDURE — 86901 BLOOD TYPING SEROLOGIC RH(D): CPT

## 2019-01-02 PROCEDURE — 96374 THER/PROPH/DIAG INJ IV PUSH: CPT

## 2019-01-02 PROCEDURE — 86850 RBC ANTIBODY SCREEN: CPT

## 2019-01-02 PROCEDURE — 83690 ASSAY OF LIPASE: CPT

## 2019-01-02 PROCEDURE — 99285 EMERGENCY DEPT VISIT HI MDM: CPT

## 2019-01-02 RX ORDER — ONDANSETRON 4 MG/1
4 TABLET, ORALLY DISINTEGRATING ORAL EVERY 8 HOURS PRN
Qty: 8 TAB | Refills: 0 | Status: SHIPPED | OUTPATIENT
Start: 2019-01-02 | End: 2019-01-02

## 2019-01-02 RX ORDER — SODIUM CHLORIDE 9 MG/ML
1000 INJECTION, SOLUTION INTRAVENOUS ONCE
Status: COMPLETED | OUTPATIENT
Start: 2019-01-02 | End: 2019-01-02

## 2019-01-02 RX ORDER — ONDANSETRON 4 MG/1
4 TABLET, ORALLY DISINTEGRATING ORAL EVERY 8 HOURS PRN
Qty: 8 TAB | Refills: 0 | Status: SHIPPED | OUTPATIENT
Start: 2019-01-02 | End: 2019-03-10

## 2019-01-02 RX ORDER — OMEPRAZOLE 40 MG/1
40 CAPSULE, DELAYED RELEASE ORAL 2 TIMES DAILY
Qty: 60 CAP | Refills: 0 | Status: SHIPPED | OUTPATIENT
Start: 2019-01-02 | End: 2019-03-10

## 2019-01-02 RX ORDER — ONDANSETRON 2 MG/ML
4 INJECTION INTRAMUSCULAR; INTRAVENOUS
Status: DISCONTINUED | OUTPATIENT
Start: 2019-01-02 | End: 2019-01-02 | Stop reason: HOSPADM

## 2019-01-02 RX ORDER — SUCRALFATE ORAL 1 G/10ML
1 SUSPENSION ORAL EVERY 6 HOURS
Qty: 600 ML | Refills: 0 | Status: SHIPPED | OUTPATIENT
Start: 2019-01-02 | End: 2019-03-10

## 2019-01-02 RX ADMIN — ONDANSETRON HYDROCHLORIDE 4 MG: 2 INJECTION, SOLUTION INTRAMUSCULAR; INTRAVENOUS at 09:53

## 2019-01-02 RX ADMIN — SODIUM CHLORIDE 1000 ML: 9 INJECTION, SOLUTION INTRAVENOUS at 09:30

## 2019-01-02 NOTE — DISCHARGE INSTRUCTIONS
Gastritis    Avoid all ibuprofen and naproxen, aspirin and alcohol.  Take your ProTime inhibitor every day and Carafate as prescribed.  Take Zofran for nausea.  Follow-up with AA.  Return for worsening bleeding..  Return for worsening pain, bleeding or pain and fever.  Followup with your doctor or GI if not better in 3-4 days.      You had a borderline or high normal blood pressure reading today.  This does not necessarily mean you have hypertension.  Please followup with your/a primary physician for comprehensive blood pressure evaluation and yearly fasting cholesterol assessment.  BP Readings from Last 3 Encounters:   01/02/19 121/72   12/09/18 (!) 164/74   10/07/18 127/91         You have gastritis. Gastritis is an irritation of the stomach. This is often caused by medications, but can be from anything that bothers the stomach.   Other stomach irritants are:  Alcohol.  Caffeine.  Nicotine.  Spicy or acid foods.     Medications for pain and arthritis. Aspirin and other anti-inflammatory medicines such as ibuprofen (Advil), naproxen (Aleve), and ketoprofen (Orudis) can be highly irritating.  Emotional distress.     Symptoms of gastritis may include:  Abdominal pain. Indigestion. Nausea and or vomiting. Bleeding.      Some patients with chronic gastritis and ulcers have been infected by a germ. They may need special testing. Medications which kill germs can be used to cure this condition.    Treatment includes avoiding the substances mentioned above that are known to cause stomach trouble.    Medications used to treat gastritis can include:  Antacids.  Medicines to control vomiting.   Acid blocking medicines (Axid, Pepcid, Prevacid, Prilosec, Tagamet, Zantac).     Symptoms of gastritis usually improve within 2-3 days of starting treatment. Call your caregiver if you are not better in a few days.    SEEK MEDICAL CARE IF YOU:    Have increased stomach pain or chest pain.  Vomit blood.  Faint or feel light headed. Can  not keep fluids down.  Pass bloody or black stools.  Develop severe back pain.     Document Released: 12/18/2006  Document Re-Released: 06/30/2008  [employer]® Patient Information ©2008 [employer].

## 2019-01-02 NOTE — ED NOTES
[facility] from [facility].    Pt reports N/V/ epigastric pain for several days.  Pt reports blood in emesis and stool.  Pt unsure if febrile at home.  Pt drank vodka today.

## 2019-01-02 NOTE — ED NOTES
Pt given d/c paperwork and prescriptions with instructions.  Pt given information for outpatient alcohol treatment facilities in this area, with the programs that accept her insurance highlighted.    Pt questions answered.  Pt changed into her personal clothing.

## 2019-01-02 NOTE — ED PROVIDER NOTES
"ED Provider Note    Scribed for Marcello Melton M.D. by Nav Tavares. 1/2/2019  8:51 AM    Primary care provider: Pcp Pt States None  Means of arrival: walk in  History obtained from: patient  History limited by: none    CHIEF COMPLAINT  Chief Complaint   Patient presents with   • Abdominal Pain     pt c/o epigastric pain, emesis in blood and stool.         HPI  Ashleigh Marquis is a 58 y.o. female who presents to the Emergency Department complaining of gradually worsening epigastric abdominal pain starting 3 days ago. She describes her pain as 8/10 severity that is sharp, and greatest in the right upper quadrant. Patient reports associated hematochezia, hematemesis, diarrhea, dysuria. She reports a history of upper GI bleed for which she has been admitted to the ICU 3 weeks ago. Patient states that she is a chronic user of alcohol and drinks \"a lot\" daily, with her last use this morning. She has been using her prescribed Carafate and omeprazole with no relief to her symptoms. She also reports a history of \"kidney surgery\". Patient denies fever, melena, history of diabetes.      REVIEW OF SYSTEMS  Pertinent positives include: abdominal pain, hematochezia, hematemesis, diarrhea, dysuria.  Pertinent negatives include: fever, melena.  10+ systems reviewed and negative.      PAST MEDICAL HISTORY  Past Medical History:   Diagnosis Date   • Alcoholism (HCC)    • Anxiety    • Arthritis    • ASTHMA    • Cancer (HCC) 1981    cervical   • Chronic low back pain    • Congestive heart failure (HCC)    • Depression    • EtOH dependence (HCC)    • Fall     alcohol related   • GERD (gastroesophageal reflux disease)    • HTN    • Hypertension    • Indigestion     GERD   • Muscle disorder    • OSTEOPOROSIS    • Other specified symptom associated with female genital organs     \"I have fibroids bad\"\"   • Psychiatric disorder    • PTSD (post-traumatic stress disorder)    • Renal disorder     Hx of stones   • Seizure (HCC)     " several years ago r/t alcohol withdrawl   • Ulcer    • Vitamin D deficiency        FAMILY HISTORY  Family History   Problem Relation Age of Onset   • Heart Disease Father    • Hypertension Father    • Diabetes Father    • Cancer Paternal Grandmother    • Depression Other    • Lung Disease Neg Hx    • Stroke Neg Hx        SOCIAL HISTORY  Social History   Substance Use Topics   • Smoking status: Current Every Day Smoker     Packs/day: 0.50     Years: 20.00     Types: Cigarettes   • Smokeless tobacco: Never Used      Comment: 1/2 pack per day   • Alcohol use Yes      Comment: vodka     History   Drug Use No       SURGICAL HISTORY  Past Surgical History:   Procedure Laterality Date   • GASTROSCOPY-ENDO N/A 10/3/2018    Procedure: GASTROSCOPY-ENDO;  Surgeon: Ben Negro M.D.;  Location: ENDOSCOPY [facility];  Service: Gastroenterology   • CYSTOSCOPY STENT PLACEMENT  11/9/2010    Performed by ANGEL HARRIS at [facility]   • URETEROSCOPY  11/9/2010    Performed by ANGEL HARRIS at SUR[facility]ERY [facility]   • LASERTRIPSY  11/9/2010    Performed by ANGEL HARRIS at SUR[facility]ERY [facility]   • STENT REMOVAL  11/9/2010    Performed by ANGEL HARRIS at SUR[facility]ERY [facility]   • CYSTO STENT PLACEMNT PRE SURG  10/7/2010    Performed by ANGEL HARRIS at SURGERY [facility]   • HERNIA REPAIR  1977       CURRENT MEDICATIONS  Home Medications     Reviewed by Frederick Hermosillo R.N. (Registered Nurse) on 01/02/19 at 0840  Med List Status: Partial   Medication Last Dose Status   acetaminophen (TYLENOL) 325 MG Tab  Active   albuterol 108 (90 Base) MCG/ACT Aero Soln inhalation aerosol  Active   DULoxetine (CYMBALTA) 20 MG Cap DR Particles  Active   folic acid (FOLVITE) 1 MG Tab  Active   guaiFENesin LA (MUCINEX) 600 MG TABLET SR 12 HR  Active   omeprazole (PRILOSEC) 40 MG delayed-release capsule  Active   potassium chloride SA (KDUR) 20 MEQ Tab CR  Active   sucralfate (CARAFATE) 1 GM/10ML Suspension   Active   thiamine (THIAMINE) 100 MG tablet  Active                ALLERGIES  Allergies   Allergen Reactions   • Sulfa Drugs Vomiting   • Toradol Vomiting   • Gabapentin      Bowel incontinence     • Amoxicillin Rash     Reported by NAIDA on arrival to ED    Pt states she takes PCN 10/3       PHYSICAL EXAM  VITAL SIGNS: /72   Pulse 79   Temp 36.1 °C (97 °F) (Temporal)   Resp 18   LMP 11/02/2015   SpO2 96%   Reviewed and high normal blood pressure  Constitutional: Well developed, Well nourished, Smells of alcohol. Disheveled.  Alcohol intoxication limits history  HENT: Normocephalic, atraumatic, bilateral external ears normal, oropharynx moist, No exudates or erythema.   Eyes: PERRLA, conjunctiva pink, no scleral icterus.   Cardiovascular: Regular rate and rhythm. No murmurs, rubs or gallops.   Respiratory: Lungs clear to auscultation bilaterally. No wheezes, rales, or rhonchi.   Abdominal:  Abdomen soft, non-tender, non distended. No rebound, or guarding.    Skin: No erythema, no rash.   Genitourinary: No costovertebral angle tenderness.   Musculoskeletal: No edema.   Neurologic: Alert & oriented x 3, cranial nerves 2-12 intact by passive exam.  No focal deficit noted.Slurred speech.   Psychiatric: Affect normal, Judgment normal, Mood normal.   Rectal: Mucous. Trace heme positive. Old external hemorrhoid.     DIFFERENTIAL DIAGNOSIS:  Alcohol abuse, upper GI bleed, lower GI bleed, diverticulosis, esophageal varices.    LABORATORY:  Results for orders placed or performed during the hospital encounter of 01/02/19   CBC WITH DIFFERENTIAL   Result Value Ref Range    WBC 5.4 4.8 - 10.8 K/uL    RBC 4.14 (L) 4.20 - 5.40 M/uL    Hemoglobin 12.3 12.0 - 16.0 g/dL    Hematocrit 37.9 37.0 - 47.0 %    MCV 91.5 81.4 - 97.8 fL    MCH 29.7 27.0 - 33.0 pg    MCHC 32.5 (L) 33.6 - 35.0 g/dL    RDW 58.2 (H) 35.9 - 50.0 fL    Platelet Count 275 164 - 446 K/uL    MPV 9.5 9.0 - 12.9 fL    Neutrophils-Polys 56.90 44.00 - 72.00 %     Lymphocytes 23.70 22.00 - 41.00 %    Monocytes 15.30 (H) 0.00 - 13.40 %    Eosinophils 1.50 0.00 - 6.90 %    Basophils 2.20 (H) 0.00 - 1.80 %    Immature Granulocytes 0.40 0.00 - 0.90 %    Nucleated RBC 0.00 /100 WBC    Neutrophils (Absolute) 3.08 2.00 - 7.15 K/uL    Lymphs (Absolute) 1.28 1.00 - 4.80 K/uL    Monos (Absolute) 0.83 0.00 - 0.85 K/uL    Eos (Absolute) 0.08 0.00 - 0.51 K/uL    Baso (Absolute) 0.12 0.00 - 0.12 K/uL    Immature Granulocytes (abs) 0.02 0.00 - 0.11 K/uL    NRBC (Absolute) 0.00 K/uL   COMP METABOLIC PANEL   Result Value Ref Range    Sodium 141 135 - 145 mmol/L    Potassium 3.8 3.6 - 5.5 mmol/L    Chloride 105 96 - 112 mmol/L    Co2 21 20 - 33 mmol/L    Anion Gap 15.0 (H) 0.0 - 11.9    Glucose 75 65 - 99 mg/dL    Bun 9 8 - 22 mg/dL    Creatinine 0.59 0.50 - 1.40 mg/dL    Calcium 8.7 8.5 - 10.5 mg/dL    AST(SGOT) 133 (H) 12 - 45 U/L    ALT(SGPT) 45 2 - 50 U/L    Alkaline Phosphatase 148 (H) 30 - 99 U/L    Total Bilirubin 0.4 0.1 - 1.5 mg/dL    Albumin 3.6 3.2 - 4.9 g/dL    Total Protein 7.1 6.0 - 8.2 g/dL    Globulin 3.5 1.9 - 3.5 g/dL    A-G Ratio 1.0 g/dL   LIPASE   Result Value Ref Range    Lipase 56 11 - 82 U/L   URINALYSIS,CULTURE IF INDICATED   Result Value Ref Range    Color Yellow     Character Clear     Specific Gravity 1.009 <1.035    Ph 6.0 5.0 - 8.0    Glucose Negative Negative mg/dL    Ketones Negative Negative mg/dL    Protein Negative Negative mg/dL    Bilirubin Negative Negative    Urobilinogen, Urine 0.2 Negative    Nitrite Negative Negative    Leukocyte Esterase Trace (A) Negative    Occult Blood Negative Negative    Micro Urine Req Microscopic    DIAGNOSTIC ALCOHOL   Result Value Ref Range    Diagnostic Alcohol 0.32 (H) 0.00 g/dL   Prothrombin Time   Result Value Ref Range    PT 12.2 12.0 - 14.6 sec    INR 0.90 0.87 - 1.13   URINE MICROSCOPIC (W/UA)   Result Value Ref Range    WBC 2-5 /hpf    RBC 0-2 /hpf    Bacteria Negative None /hpf    Epithelial Cells Few /hpf     Hyaline Cast 3-5 (A) /lpf   Lab results reviewed by me.     INTERVENTIONS: Indication IV fluids suspected GI bleed and suspected dehydration is evidenced by history of vomiting and failure of p.o. fluids  Medications   ondansetron (ZOFRAN) syringe/vial injection 4 mg (not administered)   NS infusion 1,000 mL (not administered)   Response: Improved hydration repeat assessment.    ED COURSE:  Nursing notes, VS, PMSFHx reviewed in chart.     8:51 AM - Patient seen and examined at bedside. Rectal exam performed with female nurse chaperone. See above for details. Patient will be treated with NS 1000 ml for clinical dehydration, vomiting, Zofran 4 mg for her symptoms. Ordered CBC with differential, CMP, Lipase, Urinalysis, Diagnostic alcohol, PT/INR, COD, Urine microscope to evaluate.     10:35 AM - Recheck: Patient re-evaluated at beside. Patient was found to be asleep, hypoxic at 81%. On waking, she returned immediatly to 98% on room air. Patient had a positive response to IV fluids with improved mucous membrane moisture. Patient reports feeling improved with interventions. Patient's diagnostic results discussed. Discussed patient's condition and treatment plan. Patient will be discharged with instructions and provided with strict return precautions. Patient will be sent home with a prescription for Zofran. Advised to follow up with her primary. Instructed to return to Emergency Department immediately if any new or worsening symptoms.    Discharge vitals: /72   Pulse 79   Temp 36.1 °C (97 °F) (Temporal)   Resp 18   LMP 11/02/2015   SpO2 96%     MEDICAL DECISION MAKING:  This patient presents with abdominal pain and hematemesis with a history of alcohol abuse intoxication and multiple superficial gastric ulcers.  There is no evidence of any significant GI bleed.  There were no varices on recent and viewed.  Patient needs to stop drinking alcohol and was given references for detox programs.  She was given  refills of her Carafate and omeprazole.  She was given a prescription for nausea medicine    PLAN:  Current Discharge Medication List      START taking these medications    Details   ondansetron (ZOFRAN ODT) 4 MG TABLET DISPERSIBLE Take 1 Tab by mouth every 8 hours as needed.  Qty: 8 Tab, Refills: 0           Discontinue alcohol abuse  Gastritis handout given  Return for uncontrolled bleeding, worsening pain, pain and fever, dizziness or ill appearance    [address] [address]  [phone]  Schedule an appointment as soon as possible for a visit         CONDITION: Stable    FINAL IMPRESSION  1. Hematemesis with nausea    2. Upper abdominal pain    3. Alcoholic intoxication without complication (HCC)    4. Alcohol abuse    5. Peptic ulceration    6. Alcoholic liver disease (HCC)          Nav GAN (Scribe), am scribing for, and in the presence of, Marcello Melton M.D..    Electronically signed by: Nav Tavares (Scribe), 1/2/2019    IMarcello M.D. personally performed the services described in this documentation, as scribed by Nav Tavares in my presence, and it is both accurate and complete.    The note accurately reflects work and decisions made by me.  Marcello Melton  1/2/2019  1:24 PM

## 2019-01-03 ENCOUNTER — HOSPITAL ENCOUNTER (EMERGENCY)
Dept: HOSPITAL 8 - ED | Age: 59
Discharge: HOME | End: 2019-01-03
Payer: MEDICAID

## 2019-01-03 ENCOUNTER — PATIENT OUTREACH (OUTPATIENT)
Dept: HEALTH INFORMATION MANAGEMENT | Facility: OTHER | Age: 59
End: 2019-01-03

## 2019-01-03 VITALS — WEIGHT: 176.37 LBS | HEIGHT: 67 IN | BODY MASS INDEX: 27.68 KG/M2

## 2019-01-03 VITALS — SYSTOLIC BLOOD PRESSURE: 122 MMHG | DIASTOLIC BLOOD PRESSURE: 68 MMHG

## 2019-01-03 DIAGNOSIS — Z88.8: ICD-10-CM

## 2019-01-03 DIAGNOSIS — F32.9: ICD-10-CM

## 2019-01-03 DIAGNOSIS — F43.10: ICD-10-CM

## 2019-01-03 DIAGNOSIS — Z88.1: ICD-10-CM

## 2019-01-03 DIAGNOSIS — Z88.2: ICD-10-CM

## 2019-01-03 DIAGNOSIS — K21.9: ICD-10-CM

## 2019-01-03 DIAGNOSIS — E11.9: ICD-10-CM

## 2019-01-03 DIAGNOSIS — I10: ICD-10-CM

## 2019-01-03 DIAGNOSIS — Z91.14: ICD-10-CM

## 2019-01-03 DIAGNOSIS — R10.84: Primary | ICD-10-CM

## 2019-01-03 DIAGNOSIS — Z72.9: ICD-10-CM

## 2019-01-03 DIAGNOSIS — Z88.6: ICD-10-CM

## 2019-01-03 DIAGNOSIS — F10.20: ICD-10-CM
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
## 2019-01-03 DIAGNOSIS — F17.200: ICD-10-CM

## 2019-01-03 PROCEDURE — 99283 EMERGENCY DEPT VISIT LOW MDM: CPT

## 2019-01-03 NOTE — LETTER
Ashleigh Marquis  [address] [place] [address]    January 3, 2019      Dear Ashleigh Marquis,    [facility] wants to ensure your discharge home is safe and you or your loved ones have had all of your questions answered regarding your care after you leave the hospital.    Our discharge team was unsuccessful in our attempts to contact you telephonically and we wanted to be sure that you had a list of resources and contact information should you have any questions regarding your hospital stay, follow-up instructions, or active medical symptoms.    Questions or Concerns Regarding… Contact   Medical Questions Related to Your Discharge  (7 days a week, 8am-5pm) Contact a Nurse Care Coordinator   [phone]   Medical Questions Not Related to Your Discharge  (24 hours a day / 7 days a week)  Contact the Nurse Health Line   [phone]    Medications or Discharge Instructions Refer to your discharge packet   or contact your -[phone]   Follow-up Appointment(s) Schedule your appointment via [employer]   or contact Scheduling [phone]   Billing Review your statement via [employer]  or contact Billing [phone]   Medical Records Review your records via [employer]   or contact Medical Records [phone]     You can also easily access your medical information, test results and upcoming appointments via the [employer] free online health management tool. You can learn more and sign up at [employer]. For assistance setting up your [employer] account, please call [phone].    Once again, we want to ensure your discharge home is safe and that you have a clear understanding of any next steps in your care. If you have any questions or concerns, please do not hesitate to contact us, we are here for you. Thank you for choosing [facility] for your healthcare needs.    Sincerely,      Your [facility] Healthcare Team

## 2019-03-03 ENCOUNTER — HOSPITAL ENCOUNTER (EMERGENCY)
Facility: MEDICAL CENTER | Age: 59
End: 2019-03-04
Attending: EMERGENCY MEDICINE
Payer: MEDICAID

## 2019-03-03 ENCOUNTER — APPOINTMENT (OUTPATIENT)
Dept: RADIOLOGY | Facility: MEDICAL CENTER | Age: 59
End: 2019-03-03
Attending: EMERGENCY MEDICINE
Payer: MEDICAID

## 2019-03-03 DIAGNOSIS — F10.920 ACUTE ALCOHOLIC INTOXICATION WITHOUT COMPLICATION (HCC): ICD-10-CM

## 2019-03-03 LAB
ALBUMIN SERPL BCP-MCNC: 4.3 G/DL (ref 3.2–4.9)
ALBUMIN/GLOB SERPL: 1.3 G/DL
ALP SERPL-CCNC: 95 U/L (ref 30–99)
ALT SERPL-CCNC: 16 U/L (ref 2–50)
ANION GAP SERPL CALC-SCNC: 14 MMOL/L (ref 0–11.9)
AST SERPL-CCNC: 30 U/L (ref 12–45)
BASOPHILS # BLD AUTO: 1.1 % (ref 0–1.8)
BASOPHILS # BLD: 0.08 K/UL (ref 0–0.12)
BILIRUB SERPL-MCNC: 0.2 MG/DL (ref 0.1–1.5)
BUN SERPL-MCNC: 9 MG/DL (ref 8–22)
CALCIUM SERPL-MCNC: 10 MG/DL (ref 8.5–10.5)
CHLORIDE SERPL-SCNC: 99 MMOL/L (ref 96–112)
CO2 SERPL-SCNC: 25 MMOL/L (ref 20–33)
CREAT SERPL-MCNC: 0.77 MG/DL (ref 0.5–1.4)
EOSINOPHIL # BLD AUTO: 0.01 K/UL (ref 0–0.51)
EOSINOPHIL NFR BLD: 0.1 % (ref 0–6.9)
ERYTHROCYTE [DISTWIDTH] IN BLOOD BY AUTOMATED COUNT: 53.1 FL (ref 35.9–50)
ETHANOL BLD-MCNC: 0.34 G/DL
GLOBULIN SER CALC-MCNC: 3.2 G/DL (ref 1.9–3.5)
GLUCOSE SERPL-MCNC: 83 MG/DL (ref 65–99)
HCT VFR BLD AUTO: 44.7 % (ref 37–47)
HGB BLD-MCNC: 14.1 G/DL (ref 12–16)
IMM GRANULOCYTES # BLD AUTO: 0.02 K/UL (ref 0–0.11)
IMM GRANULOCYTES NFR BLD AUTO: 0.3 % (ref 0–0.9)
LYMPHOCYTES # BLD AUTO: 1.64 K/UL (ref 1–4.8)
LYMPHOCYTES NFR BLD: 23.4 % (ref 22–41)
MCH RBC QN AUTO: 30.5 PG (ref 27–33)
MCHC RBC AUTO-ENTMCNC: 31.5 G/DL (ref 33.6–35)
MCV RBC AUTO: 96.8 FL (ref 81.4–97.8)
MONOCYTES # BLD AUTO: 0.78 K/UL (ref 0–0.85)
MONOCYTES NFR BLD AUTO: 11.1 % (ref 0–13.4)
NEUTROPHILS # BLD AUTO: 4.48 K/UL (ref 2–7.15)
NEUTROPHILS NFR BLD: 64 % (ref 44–72)
NRBC # BLD AUTO: 0 K/UL
NRBC BLD-RTO: 0 /100 WBC
PLATELET # BLD AUTO: 284 K/UL (ref 164–446)
PMV BLD AUTO: 10.9 FL (ref 9–12.9)
POTASSIUM SERPL-SCNC: 3.4 MMOL/L (ref 3.6–5.5)
PROT SERPL-MCNC: 7.5 G/DL (ref 6–8.2)
RBC # BLD AUTO: 4.62 M/UL (ref 4.2–5.4)
SODIUM SERPL-SCNC: 138 MMOL/L (ref 135–145)
WBC # BLD AUTO: 7 K/UL (ref 4.8–10.8)

## 2019-03-03 PROCEDURE — 700101 HCHG RX REV CODE 250: Performed by: EMERGENCY MEDICINE

## 2019-03-03 PROCEDURE — 70450 CT HEAD/BRAIN W/O DYE: CPT

## 2019-03-03 PROCEDURE — 99285 EMERGENCY DEPT VISIT HI MDM: CPT

## 2019-03-03 PROCEDURE — 85025 COMPLETE CBC W/AUTO DIFF WBC: CPT

## 2019-03-03 PROCEDURE — 96365 THER/PROPH/DIAG IV INF INIT: CPT

## 2019-03-03 PROCEDURE — 80053 COMPREHEN METABOLIC PANEL: CPT

## 2019-03-03 PROCEDURE — 80307 DRUG TEST PRSMV CHEM ANLYZR: CPT

## 2019-03-03 PROCEDURE — 96366 THER/PROPH/DIAG IV INF ADDON: CPT

## 2019-03-03 PROCEDURE — 304561 HCHG STAT O2

## 2019-03-03 RX ADMIN — THIAMINE HYDROCHLORIDE: 100 INJECTION, SOLUTION INTRAMUSCULAR; INTRAVENOUS at 22:21

## 2019-03-04 VITALS
RESPIRATION RATE: 14 BRPM | BODY MASS INDEX: 33.02 KG/M2 | DIASTOLIC BLOOD PRESSURE: 71 MMHG | SYSTOLIC BLOOD PRESSURE: 120 MMHG | TEMPERATURE: 97.7 F | WEIGHT: 198.41 LBS | OXYGEN SATURATION: 98 % | HEART RATE: 81 BPM

## 2019-03-04 LAB — POC BREATHALIZER: 0.01 PERCENT (ref 0–0.01)

## 2019-03-04 PROCEDURE — 302970 POC BREATHALIZER: Performed by: EMERGENCY MEDICINE

## 2019-03-04 NOTE — DISCHARGE PLANNING
Medical Social Work    MSW received a call from bedside RN requesting a bus pass for pt.  RN was advised that pt has [place] Medicaid and can to go the ER Lobby to obtain an [place] flyer and call for a ride.  Pt has been provided with numerous transportation assistance in the past and will NOT receive anymore.

## 2019-03-04 NOTE — ED PROVIDER NOTES
ED Provider Note    Patient signed out from Dr. Stafford for reevaluation after sobering up.  Please see her note for the initial evaluation and management.  Breathalyzer showed a level of 0.01 this morning.  On reevaluation, patient is sleeping but easily arousable and noted to be in no acute distress and nontoxic in appearance.  She admits to drinking too much alcohol last night.  She denies suicidal or homicidal ideations.  She is not exhibiting any signs of active hallucinations or delusions.  No focal neurological deficits noted.  She is able to ambulate unassisted without difficulty.  Patient was advised on outpatient follow-up and getting help for her chronic alcohol use.  Return to ED precautions were given.  Patient verbalized understanding and agreed with plan of care with no further questions or concerns.

## 2019-03-04 NOTE — ED TRIAGE NOTES
Patient was brought into the ED by NAIDA, was found down on the ground in the rain with altered mental status. Pt is lethargic and slurring words, but oriented x 3

## 2019-03-04 NOTE — ED NOTES
Pt discharged home. Assessment complete. Pt ambulates self. VS stable. Pt verbalized discharge instructions. Pt verbalizes teaching provided.

## 2019-03-04 NOTE — ED PROVIDER NOTES
ED Provider Note    CHIEF COMPLAINT  ETOH    HPI  Ashleigh Marquis is a 58 y.o. female who presents to the ED after being found down on the sidewalk.  History is significantly limited secondary to her level of intoxication.  She apparently has a history of frequent visits to the ER with alcohol intoxication and was actually at [facility] last night.  She will arouse to voice but appears quite intoxicated.    REVIEW OF SYSTEMS  Limited secondary to patient being intoxicated.    PAST MEDICAL HISTORY   has a past medical history of Alcoholism ([facility]); Anxiety; Arthritis; ASTHMA; Cancer ([facility]) (1981); Chronic low back pain; Congestive heart failure ([facility]); Depression; EtOH dependence ([facility]); Fall; GERD (gastroesophageal reflux disease); HTN; Hypertension; Indigestion; Muscle disorder; OSTEOPOROSIS; Other specified symptom associated with female genital organs; Psychiatric disorder; PTSD (post-traumatic stress disorder); Renal disorder; Seizure ([facility]); Ulcer; and Vitamin D deficiency.    SOCIAL HISTORY  Social History     Social History Main Topics   • Smoking status: Current Every Day Smoker     Packs/day: 0.50     Years: 20.00     Types: Cigarettes   • Smokeless tobacco: Never Used      Comment: 1/2 pack per day   • Alcohol use Yes      Comment: vodka   • Drug use: No   • Sexual activity: No       SURGICAL HISTORY   has a past surgical history that includes hernia repair (1977); cysto stent placemnt pre surg (10/7/2010); cystoscopy stent placement (11/9/2010); ureteroscopy (11/9/2010); lasertripsy (11/9/2010); stent removal (11/9/2010); and gastroscopy-endo (N/A, 10/3/2018).    CURRENT MEDICATIONS  Home Medications     Reviewed by Brett Sandoval R.N. (Registered Nurse) on 03/03/19 at 2051  Med List Status: Unable to Obtain   Medication Last Dose Status   acetaminophen (TYLENOL) 325 MG Tab  Active   albuterol 108 (90 Base) MCG/ACT Aero Soln inhalation aerosol  Active   DULoxetine (CYMBALTA) 20 MG Cap DR Particles   Active   folic acid (FOLVITE) 1 MG Tab  Active   guaiFENesin LA (MUCINEX) 600 MG TABLET SR 12 HR  Active   omeprazole (PRILOSEC) 40 MG delayed-release capsule  Active   ondansetron (ZOFRAN ODT) 4 MG TABLET DISPERSIBLE  Active   potassium chloride SA (KDUR) 20 MEQ Tab CR  Active   sucralfate (CARAFATE) 1 GM/10ML Suspension  Active   thiamine (THIAMINE) 100 MG tablet  Active                ALLERGIES  Allergies   Allergen Reactions   • Sulfa Drugs Vomiting   • Toradol Vomiting   • Gabapentin      Bowel incontinence     • Amoxicillin Rash     Reported by NAIDA on arrival to ED    Pt states she takes PCN 10/3       PHYSICAL EXAM  VITAL SIGNS: /71   Pulse 77   Temp 36.5 °C (97.7 °F) (Oral)   Resp 14   Wt 90 kg (198 lb 6.6 oz)   LMP 11/02/2015   SpO2 97%   BMI 33.02 kg/m²      Constitutional: Patient is lying on the gurney and appears intoxicated.  Smells strongly of alcohol.  HENT: Normocephalic atraumatic. Bilateral external ears normal. Nose normal. Mucous membranes are moist.  Eyes: Pupils are equal and reactive. Conjunctiva normal. Non-icteric sclera.   Neck: Normal range of motion without tenderness. Supple.   Cardiovascular: Regular rate and rhythm. No murmurs, gallops or rubs.  Thorax & Lungs: Breath sounds are clear to auscultation bilaterally. No wheezing, rhonchi or rales.  Abdomen: Soft, nontender and nondistended. No peritoneal signs noted.  Skin: Warm and dry.   Extremities: 2+ peripheral pulses.  No edema, cyanosis, or clubbing.  Musculoskeletal: Good range of motion in all major joints. No tenderness to palpation or major deformities noted.   Neurologic: Patient appears intoxicated.    DIAGNOSTIC STUDIES / PROCEDURES    LABS  Results for orders placed or performed during the hospital encounter of 03/03/19   Blood Alcohol   Result Value Ref Range    Diagnostic Alcohol 0.34 (H) 0.00 g/dL   CBC WITH DIFFERENTIAL   Result Value Ref Range    WBC 7.0 4.8 - 10.8 K/uL    RBC 4.62 4.20 - 5.40 M/uL     Hemoglobin 14.1 12.0 - 16.0 g/dL    Hematocrit 44.7 37.0 - 47.0 %    MCV 96.8 81.4 - 97.8 fL    MCH 30.5 27.0 - 33.0 pg    MCHC 31.5 (L) 33.6 - 35.0 g/dL    RDW 53.1 (H) 35.9 - 50.0 fL    Platelet Count 284 164 - 446 K/uL    MPV 10.9 9.0 - 12.9 fL    Neutrophils-Polys 64.00 44.00 - 72.00 %    Lymphocytes 23.40 22.00 - 41.00 %    Monocytes 11.10 0.00 - 13.40 %    Eosinophils 0.10 0.00 - 6.90 %    Basophils 1.10 0.00 - 1.80 %    Immature Granulocytes 0.30 0.00 - 0.90 %    Nucleated RBC 0.00 /100 WBC    Neutrophils (Absolute) 4.48 2.00 - 7.15 K/uL    Lymphs (Absolute) 1.64 1.00 - 4.80 K/uL    Monos (Absolute) 0.78 0.00 - 0.85 K/uL    Eos (Absolute) 0.01 0.00 - 0.51 K/uL    Baso (Absolute) 0.08 0.00 - 0.12 K/uL    Immature Granulocytes (abs) 0.02 0.00 - 0.11 K/uL    NRBC (Absolute) 0.00 K/uL   COMP METABOLIC PANEL   Result Value Ref Range    Sodium 138 135 - 145 mmol/L    Potassium 3.4 (L) 3.6 - 5.5 mmol/L    Chloride 99 96 - 112 mmol/L    Co2 25 20 - 33 mmol/L    Anion Gap 14.0 (H) 0.0 - 11.9    Glucose 83 65 - 99 mg/dL    Bun 9 8 - 22 mg/dL    Creatinine 0.77 0.50 - 1.40 mg/dL    Calcium 10.0 8.5 - 10.5 mg/dL    AST(SGOT) 30 12 - 45 U/L    ALT(SGPT) 16 2 - 50 U/L    Alkaline Phosphatase 95 30 - 99 U/L    Total Bilirubin 0.2 0.1 - 1.5 mg/dL    Albumin 4.3 3.2 - 4.9 g/dL    Total Protein 7.5 6.0 - 8.2 g/dL    Globulin 3.2 1.9 - 3.5 g/dL    A-G Ratio 1.3 g/dL   ESTIMATED GFR   Result Value Ref Range    GFR If African American >60 >60 mL/min/1.73 m 2    GFR If Non African American >60 >60 mL/min/1.73 m 2     RADIOLOGY  CT-HEAD W/O   Final Result         1.  No acute intracranial abnormality is identified, there are nonspecific white matter changes, commonly associated with small vessel ischemic disease.  Associated mild cerebral atrophy is noted.        COURSE & MEDICAL DECISION MAKING  Pertinent Labs & Imaging studies reviewed. (See chart for details)    This is a 58-year-old female presenting to the emergency department  after being found down on the sidewalk.  On initial evaluation, the patient appeared obviously intoxicated and smells strongly of alcohol.  She has been seen here several times in the past for acute alcohol intoxication.  No obvious evidence of trauma was noted on exam, but the patient was not very cooperative.  I did obtain a CT of the head and she had been found down.  No obvious intracranial hemorrhage or other acute abnormality was noted.  CBC and metabolic panel were normal.  Her alcohol level was notable at 0.34.  She was started on a detox bag in the emergency department given her chronic alcohol use.  The patient was signed out to my partner, Dr. Israel, pending her urine drug screen and reassessment when sober.    FINAL IMPRESSION  1. Alcoholic intoxication with complication (HCC)      Electronically signed by: Dacia Stafford, 3/3/2019 9:31 PM

## 2019-03-06 ENCOUNTER — HOSPITAL ENCOUNTER (EMERGENCY)
Facility: MEDICAL CENTER | Age: 59
End: 2019-03-06
Attending: EMERGENCY MEDICINE
Payer: MEDICAID

## 2019-03-06 VITALS
WEIGHT: 198.41 LBS | DIASTOLIC BLOOD PRESSURE: 66 MMHG | HEIGHT: 65 IN | BODY MASS INDEX: 33.06 KG/M2 | HEART RATE: 101 BPM | RESPIRATION RATE: 18 BRPM | SYSTOLIC BLOOD PRESSURE: 114 MMHG | OXYGEN SATURATION: 98 % | TEMPERATURE: 97 F

## 2019-03-06 DIAGNOSIS — F10.920 ALCOHOLIC INTOXICATION WITHOUT COMPLICATION (HCC): ICD-10-CM

## 2019-03-06 DIAGNOSIS — R41.82 ALTERED MENTAL STATUS, UNSPECIFIED ALTERED MENTAL STATUS TYPE: ICD-10-CM

## 2019-03-06 DIAGNOSIS — K04.7 DENTAL ABSCESS: ICD-10-CM

## 2019-03-06 LAB
ALBUMIN SERPL BCP-MCNC: 4.2 G/DL (ref 3.2–4.9)
ALBUMIN/GLOB SERPL: 1.2 G/DL
ALP SERPL-CCNC: 112 U/L (ref 30–99)
ALT SERPL-CCNC: 22 U/L (ref 2–50)
ANION GAP SERPL CALC-SCNC: 14 MMOL/L (ref 0–11.9)
APTT PPP: 23 SEC (ref 24.7–36)
AST SERPL-CCNC: 41 U/L (ref 12–45)
BASOPHILS # BLD AUTO: 1.3 % (ref 0–1.8)
BASOPHILS # BLD: 0.09 K/UL (ref 0–0.12)
BILIRUB SERPL-MCNC: 0.3 MG/DL (ref 0.1–1.5)
BUN SERPL-MCNC: 8 MG/DL (ref 8–22)
CALCIUM SERPL-MCNC: 9.7 MG/DL (ref 8.5–10.5)
CHLORIDE SERPL-SCNC: 104 MMOL/L (ref 96–112)
CO2 SERPL-SCNC: 27 MMOL/L (ref 20–33)
CREAT SERPL-MCNC: 0.73 MG/DL (ref 0.5–1.4)
EOSINOPHIL # BLD AUTO: 0.02 K/UL (ref 0–0.51)
EOSINOPHIL NFR BLD: 0.3 % (ref 0–6.9)
ERYTHROCYTE [DISTWIDTH] IN BLOOD BY AUTOMATED COUNT: 54.7 FL (ref 35.9–50)
ETHANOL BLD-MCNC: 0.29 G/DL
GLOBULIN SER CALC-MCNC: 3.6 G/DL (ref 1.9–3.5)
GLUCOSE SERPL-MCNC: 80 MG/DL (ref 65–99)
HCT VFR BLD AUTO: 41.7 % (ref 37–47)
HGB BLD-MCNC: 13.6 G/DL (ref 12–16)
IMM GRANULOCYTES # BLD AUTO: 0.02 K/UL (ref 0–0.11)
IMM GRANULOCYTES NFR BLD AUTO: 0.3 % (ref 0–0.9)
INR PPP: 1.05 (ref 0.87–1.13)
LIPASE SERPL-CCNC: 37 U/L (ref 11–82)
LYMPHOCYTES # BLD AUTO: 2.5 K/UL (ref 1–4.8)
LYMPHOCYTES NFR BLD: 36.8 % (ref 22–41)
MCH RBC QN AUTO: 31.5 PG (ref 27–33)
MCHC RBC AUTO-ENTMCNC: 32.6 G/DL (ref 33.6–35)
MCV RBC AUTO: 96.5 FL (ref 81.4–97.8)
MONOCYTES # BLD AUTO: 0.7 K/UL (ref 0–0.85)
MONOCYTES NFR BLD AUTO: 10.3 % (ref 0–13.4)
NEUTROPHILS # BLD AUTO: 3.46 K/UL (ref 2–7.15)
NEUTROPHILS NFR BLD: 51 % (ref 44–72)
NRBC # BLD AUTO: 0 K/UL
NRBC BLD-RTO: 0 /100 WBC
PLATELET # BLD AUTO: 287 K/UL (ref 164–446)
PMV BLD AUTO: 10.5 FL (ref 9–12.9)
POTASSIUM SERPL-SCNC: 3.5 MMOL/L (ref 3.6–5.5)
PROT SERPL-MCNC: 7.8 G/DL (ref 6–8.2)
PROTHROMBIN TIME: 13.8 SEC (ref 12–14.6)
RBC # BLD AUTO: 4.32 M/UL (ref 4.2–5.4)
SODIUM SERPL-SCNC: 145 MMOL/L (ref 135–145)
WBC # BLD AUTO: 6.8 K/UL (ref 4.8–10.8)

## 2019-03-06 PROCEDURE — 85730 THROMBOPLASTIN TIME PARTIAL: CPT

## 2019-03-06 PROCEDURE — 85025 COMPLETE CBC W/AUTO DIFF WBC: CPT

## 2019-03-06 PROCEDURE — 303977 HCHG I & D

## 2019-03-06 PROCEDURE — 36415 COLL VENOUS BLD VENIPUNCTURE: CPT

## 2019-03-06 PROCEDURE — 80053 COMPREHEN METABOLIC PANEL: CPT

## 2019-03-06 PROCEDURE — 99284 EMERGENCY DEPT VISIT MOD MDM: CPT

## 2019-03-06 PROCEDURE — 700102 HCHG RX REV CODE 250 W/ 637 OVERRIDE(OP): Performed by: EMERGENCY MEDICINE

## 2019-03-06 PROCEDURE — 83690 ASSAY OF LIPASE: CPT

## 2019-03-06 PROCEDURE — 85610 PROTHROMBIN TIME: CPT

## 2019-03-06 PROCEDURE — 304561 HCHG STAT O2

## 2019-03-06 PROCEDURE — 80307 DRUG TEST PRSMV CHEM ANLYZR: CPT

## 2019-03-06 PROCEDURE — A9270 NON-COVERED ITEM OR SERVICE: HCPCS | Performed by: EMERGENCY MEDICINE

## 2019-03-06 RX ORDER — SODIUM CHLORIDE 9 MG/ML
1000 INJECTION, SOLUTION INTRAVENOUS ONCE
Status: DISCONTINUED | OUTPATIENT
Start: 2019-03-06 | End: 2019-03-07 | Stop reason: HOSPADM

## 2019-03-06 RX ORDER — CLINDAMYCIN HYDROCHLORIDE 300 MG/1
300 CAPSULE ORAL 4 TIMES DAILY
Qty: 28 CAP | Refills: 0 | Status: SHIPPED | OUTPATIENT
Start: 2019-03-06 | End: 2019-03-11

## 2019-03-06 RX ORDER — ACETAMINOPHEN 500 MG
500 TABLET ORAL EVERY 6 HOURS PRN
Status: DISCONTINUED | OUTPATIENT
Start: 2019-03-06 | End: 2019-03-07 | Stop reason: HOSPADM

## 2019-03-06 RX ADMIN — ACETAMINOPHEN 500 MG: 500 TABLET ORAL at 21:26

## 2019-03-06 NOTE — ED TRIAGE NOTES
Pt bib ems.  Chief Complaint   Patient presents with   • ETOH Intoxication     found laying on the ground [address] screaming     Pt calm now. Sleeping.

## 2019-03-06 NOTE — ED PROVIDER NOTES
"ED Provider Note    CHIEF COMPLAINT  Chief Complaint   Patient presents with   • ETOH Intoxication     found laying on the ground [place] [address] screaming       HPI  Ashleigh Marquis is a 58 y.o. female who presents for evaluation of altered mental status.  Patient was brought in by EMS after being found lying on the ground on [address] moaning and screaming.  Patient has been seen in emerge department over 25 times in the past year alone for alcohol intoxication and complications related to this.  Upon arrival here the patient admits to imbibing alcohol.  She denies any other drug use.  Patient's had no recent: Fever, chills, cough, sputum, vomiting, hematemesis, melena hematochezia, hematuria, dysuria.  She does complains of mild epigastric discomfort.  No other acute symptomatology or complaints.    REVIEW OF SYSTEMS  See HPI for further details. All other systems negative.    PAST MEDICAL HISTORY  Past Medical History:   Diagnosis Date   • Alcoholism (HCC)    • Anxiety    • Arthritis    • ASTHMA    • Cancer (HCC) 1981    cervical   • Chronic low back pain    • Congestive heart failure (HCC)    • Depression    • EtOH dependence (HCC)    • Fall     alcohol related   • GERD (gastroesophageal reflux disease)    • HTN    • Hypertension    • Indigestion     GERD   • Muscle disorder    • OSTEOPOROSIS    • Other specified symptom associated with female genital organs     \"I have fibroids bad\"\"   • Psychiatric disorder    • PTSD (post-traumatic stress disorder)    • Renal disorder     Hx of stones   • Seizure (HCC)     several years ago r/t alcohol withdrawl   • Ulcer    • Vitamin D deficiency        FAMILY HISTORY  Family History   Problem Relation Age of Onset   • Heart Disease Father    • Hypertension Father    • Diabetes Father    • Cancer Paternal Grandmother    • Depression Other    • Lung Disease Neg Hx    • Stroke Neg Hx        SOCIAL HISTORY  Positive tobacco use; positive alcohol abuse;    SURGICAL " "HISTORY  Past Surgical History:   Procedure Laterality Date   • GASTROSCOPY-ENDO N/A 10/3/2018    Procedure: GASTROSCOPY-ENDO;  Surgeon: Ben Negro M.D.;  Location: ENDOSCOPY [facility];  Service: Gastroenterology   • CYSTOSCOPY STENT PLACEMENT  11/9/2010    Performed by ANGEL HARRIS at [facility]   • URETEROSCOPY  11/9/2010    Performed by ANGEL HARRIS at SURGERY [facility]   • LASERTRIPSY  11/9/2010    Performed by ANGEL HARRIS at [facility]   • STENT REMOVAL  11/9/2010    Performed by ANGEL HARRIS at [facility]   • CYSTO STENT PLACEMNT PRE SURG  10/7/2010    Performed by ANGEL HARRIS at [facility]   • HERNIA REPAIR  1977       CURRENT MEDICATIONS  Home Medications    **Home medications have not yet been reviewed for this encounter**         ALLERGIES  Allergies   Allergen Reactions   • Sulfa Drugs Vomiting   • Toradol Vomiting   • Gabapentin      Bowel incontinence     • Amoxicillin Rash     Reported by NAIDA on arrival to ED    Pt states she takes PCN 10/3       PHYSICAL EXAM  VITAL SIGNS: /78   Pulse 85   Temp 36.1 °C (97 °F) (Temporal)   Resp 16   Ht 1.651 m (5' 5\")   Wt 90 kg (198 lb 6.6 oz)   LMP 11/02/2015   BMI 33.02 kg/m²    Constitutional: 58-year-old female, slurred speech, oriented x3  HENT: Normocephalic, Atraumatic, Nares:Clear, Oropharynx: Mildly dry, posterior pharynx:clear; patient has a periapical abscess juxtaposed to tooth #5;  Eyes: PERRL, EOMI, Conjunctiva normal, No discharge.   Neck: Normal range of motion, No tenderness, Supple, No stridor.   Lymphatic: No lymphadenopathy noted.   Cardiovascular: Regular rate and rhythm without mumurs, gallups, rubs   Thorax & Lungs: Normal Equal breath sounds, No respiratory distress, No wheezing, no stridor, no rales. No chest tenderness.   Abdomen: Soft, nontender, nondistended, no organomegaly, positive bowel sounds normal in quality. No guarding or " rebound.  Skin: Mildly decreased skin turgor, pink, warm, dry. No rashes, petechiae, purpura. Normal capillary refill.   Back: No tenderness, No CVA tenderness.   Extremities: Intact distal pulses, No edema, No tenderness, No cyanosis,  Vascular: Pulses are 2+, symmetric in the upper and lower extremities.  Musculoskeletal: Good range of motion in all major joints. No tenderness to palpation or major deformities noted.   Neurologic: Drowsy and lethargic, but oriented x 3,  No gross focal deficits noted.     COURSE & MEDICAL DECISION MAKING  Pertinent Labs & Imaging studies reviewed. (See chart for details)  1.  IV normal saline; IV fluids administered for clinical dehydration; unable to attempt oral challenge as the patient is high risk for aspiration secondary to her altered mental status; reevaluation revealed stable status;    Laboratory studies: CBC and differential within normal; coags within normal; lipase 37; CMP shows potassium 3.5, anion gap 14, alk phos 612 otherwise within normal; diagnostic alcohol 0.29;    Procedure note: The gingival tissue overlying the fluctuant area of the dental abscess was anesthetized with 1% lidocaine.  Using a #11 blade a small stab incision was current material was expressed.  The area was cleansed and rinsed with normal saline.  No palpitations.    Discussion: At this time, the patient presents for evaluation of altered mental status.  The patient has findings consistent with acute alcohol intoxication.  I see no evidence of other conditions that require further workup or evaluation.  The patient was observed and her mental status slowly improved.  Patient does have evidence of a dental abscess which was treated with incision and drainage.  She will placed on oral antibiotics.  The patient meets criteria for discharge.  Patient is not motivated to stop drinking at this time.  I did provide her with outpatient treatment options.  Patient was discharged in stable  condition.    FINAL IMPRESSION  1. Altered mental status, unspecified altered mental status type    2. Alcoholic intoxication without complication (HCC)    3. Dental abscess    4.      Incision and drainage of dental abscess       PLAN  1.  Appropriate  discharge instructions given  2.  Clindamycin 300 mg every 6 hours #28  3.  Tylenol and/or ibuprofen for pain  4.  Follow-up with primary care/dental services    Electronically signed by: Guy G Gansert, 3/6/2019 3:54 PM

## 2019-03-07 ENCOUNTER — HOSPITAL ENCOUNTER (EMERGENCY)
Facility: MEDICAL CENTER | Age: 59
End: 2019-03-08
Attending: EMERGENCY MEDICINE
Payer: MEDICAID

## 2019-03-07 DIAGNOSIS — F10.929 ALCOHOLIC INTOXICATION WITH COMPLICATION (HCC): ICD-10-CM

## 2019-03-07 DIAGNOSIS — F43.21 SITUATIONAL DEPRESSION: ICD-10-CM

## 2019-03-07 LAB
APAP SERPL-MCNC: <10 UG/ML (ref 10–30)
EKG IMPRESSION: NORMAL
ETHANOL BLD-MCNC: 0.25 G/DL
SALICYLATES SERPL-MCNC: <4 MG/DL (ref 15–25)

## 2019-03-07 PROCEDURE — 302970 POC BREATHALIZER: Performed by: EMERGENCY MEDICINE

## 2019-03-07 PROCEDURE — 36415 COLL VENOUS BLD VENIPUNCTURE: CPT

## 2019-03-07 PROCEDURE — 93005 ELECTROCARDIOGRAM TRACING: CPT | Performed by: EMERGENCY MEDICINE

## 2019-03-07 PROCEDURE — 99285 EMERGENCY DEPT VISIT HI MDM: CPT

## 2019-03-07 PROCEDURE — 80307 DRUG TEST PRSMV CHEM ANLYZR: CPT

## 2019-03-07 ASSESSMENT — PAIN SCALES - WONG BAKER: WONGBAKER_NUMERICALRESPONSE: DOESN'T HURT AT ALL

## 2019-03-07 NOTE — ED NOTES
Discharge instructions given to patient, prescriptions provided, a verbal understanding of all instructions was stated. Pt preferred to walk out accompanied by ED tech VSS,  all belongings accounted for.

## 2019-03-07 NOTE — ED NOTES
Blood to lab. Unable to obtain IV access at this time. ERP aware. Pt awakens to voice, but cannot stay awake.

## 2019-03-08 ENCOUNTER — HOSPITAL ENCOUNTER (EMERGENCY)
Facility: MEDICAL CENTER | Age: 59
End: 2019-03-08
Attending: EMERGENCY MEDICINE
Payer: MEDICAID

## 2019-03-08 VITALS
BODY MASS INDEX: 33.66 KG/M2 | WEIGHT: 209.44 LBS | SYSTOLIC BLOOD PRESSURE: 133 MMHG | RESPIRATION RATE: 20 BRPM | HEIGHT: 66 IN | TEMPERATURE: 97.9 F | HEART RATE: 93 BPM | OXYGEN SATURATION: 94 % | DIASTOLIC BLOOD PRESSURE: 66 MMHG

## 2019-03-08 VITALS
TEMPERATURE: 98 F | HEIGHT: 64 IN | OXYGEN SATURATION: 96 % | SYSTOLIC BLOOD PRESSURE: 101 MMHG | DIASTOLIC BLOOD PRESSURE: 68 MMHG | BODY MASS INDEX: 35.68 KG/M2 | HEART RATE: 86 BPM | RESPIRATION RATE: 18 BRPM | WEIGHT: 209 LBS

## 2019-03-08 DIAGNOSIS — F10.921 ACUTE ALCOHOLIC INTOXICATION WITH DELIRIUM (HCC): ICD-10-CM

## 2019-03-08 DIAGNOSIS — R46.89 AGGRESSIVE BEHAVIOR: ICD-10-CM

## 2019-03-08 LAB
POC BREATHALIZER: 0.06 PERCENT (ref 0–0.01)
POC BREATHALIZER: NORMAL PERCENT (ref 0–0.01)

## 2019-03-08 PROCEDURE — 700102 HCHG RX REV CODE 250 W/ 637 OVERRIDE(OP): Performed by: EMERGENCY MEDICINE

## 2019-03-08 PROCEDURE — 99284 EMERGENCY DEPT VISIT MOD MDM: CPT

## 2019-03-08 PROCEDURE — A9270 NON-COVERED ITEM OR SERVICE: HCPCS | Performed by: EMERGENCY MEDICINE

## 2019-03-08 PROCEDURE — 302970 POC BREATHALIZER: Performed by: EMERGENCY MEDICINE

## 2019-03-08 RX ORDER — LISINOPRIL 10 MG/1
10 TABLET ORAL DAILY
Qty: 30 TAB | Refills: 0 | Status: SHIPPED | OUTPATIENT
Start: 2019-03-08 | End: 2019-03-08

## 2019-03-08 RX ORDER — LISINOPRIL 5 MG/1
10 TABLET ORAL ONCE
Status: COMPLETED | OUTPATIENT
Start: 2019-03-08 | End: 2019-03-08

## 2019-03-08 RX ORDER — LISINOPRIL 10 MG/1
10 TABLET ORAL DAILY
Qty: 30 TAB | Refills: 0 | Status: ON HOLD | OUTPATIENT
Start: 2019-03-08 | End: 2019-04-24

## 2019-03-08 RX ADMIN — LISINOPRIL 10 MG: 5 TABLET ORAL at 05:46

## 2019-03-08 NOTE — ED TRIAGE NOTES
Chief Complaint   Patient presents with   • Alcohol Intoxication     Pt bib ems from homeless shelter for alcohol intoxication and unable to ambulate. Pt asking for food .

## 2019-03-08 NOTE — ED NOTES
Pt belongings returned, Ativan bottle with 3 pills returned from pharmacy. Counseling and substance abuse resources provided. Pt counseled regarding crisis and suicide hotline. Hotline numbers provided on discharge paperwork. Cab voucher provided to shelter on [address] per pt request. Prescription for lisinopril sent to [facility] pharmacy on [address] per pt request. Cab called and pt ambulates to lobby with steady gait.

## 2019-03-08 NOTE — ED PROVIDER NOTES
ED Provider Note    Patient's care was signed out to me by the previous provider.  Please see the documentation performed by Dr. Chung dated 3/7/19 for the full details of this patient's admission history and physical.  In brief, this is a 58-year-old female with a history of alcohol abuse presented to the emergency department with possible lorazepam overdose and alcohol intoxication.  Per report the patient had been making statements related to suicidal ideation earlier in the day.    Patient was observed in the emergency department for several hours until clinical sobriety was achieved.  At this time, the patient was reevaluated and was tolerating oral intake and able to ambulate with a steady gait.    Life skills was consulted via tele-psych system and felt the patient would not benefit from inpatient psychiatric treatment.  Patient denied suicidal thoughts at this time.  Patient was provided with resources regarding therapy, alcohol cessation treatment, and psychiatric hospitalization if needed.    The patient will return for new or worsening symptoms and is stable at the time of discharge.    The patient is referred to a primary physician for blood pressure management, diabetic screening, and for all other preventative health concerns.    DISPOSITION:  Patient will be discharged home in stable condition.    FOLLOW UP:  [facility], Emergency Dept  [address]  [place] [address]  [phone]    If symptoms worsen      OUTPATIENT MEDICATIONS:  New Prescriptions    No medications on file        Final impression:  (F10.929) Alcoholic intoxication with complication (HCC)  (F43.21) Situational depression

## 2019-03-08 NOTE — ED NOTES
Initially Pt refused to provide a sample.  Pt was advised the longer it takes for her DANILO, the longer it takes to set up a tele pshyc consult.  Pt somewhat complied with the breathalyzer request. Result was .127%.  Pt asked to provide a urine sample.  Pt pulled her legs towards her chest and covered up.

## 2019-03-08 NOTE — ED NOTES
Sitter remains outside room to continue 1:1 observation. Pt cooperated to start IV, but not cooperating to answer questions.

## 2019-03-08 NOTE — ED NOTES
Patient remain asleep in bed. Regular unlabored respirations and occasional position changes visualized.
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
## 2019-03-08 NOTE — ED NOTES
1:1 direct observation in progress, sitter at doorway. Pt lying in [facility].

## 2019-03-08 NOTE — ED NOTES
IV attempted x 2. 2nd RN in to place IV. Pt cursing at RN and CNA and refuses to keep monitoring equipment on. Pt alert, regular unlabored respirations. Pt slurring words and uncoordinated movements. Cardiac monitor and pluse ox replaced.

## 2019-03-08 NOTE — ED NOTES
Sitter remains outside room. Patient appears asleep in bed. Regular unlabored respirations, and occasional position changes visualized.

## 2019-03-08 NOTE — ED PROVIDER NOTES
"ED Provider Note    CHIEF COMPLAINT  Chief Complaint   Patient presents with   • Suicidal Ideation     Pt told this RN she wanted to kill her self. Pt tearful at mental health appt today, crying about mother, made Si statements.    • Drug Overdose     pt was seen by mental health provider today, appears to be missing approx thirty five 2 mg lorazepam. Vodka also found in pt's purse by NAIDA.        HPI  Ashleigh Marquis is a 58 y.o. female who presents by ambulance with alcohol intoxication suicidal ideation and a possible benzodiazepine overdose.  She told the nurses that she wanted to kill herself.  She states that she is sad because her mother passed away.  She admits to me that she took lorazepam but will not tell me when or how much.  When the ambulance came to pick her up they found vodka in her purse.  She does admit to daily drinking.  She was just seen yesterday at [facility] for alcohol intoxication and is a known chronic alcoholic that is well-known to the [facility].  The patient is a very poor informant today and mumbles a lot and cries in the blanket and socks.  She will not answer any more of my questions.    REVIEW OF SYSTEMS  See HPI for further details.  Unable to obtain secondary to the patient being uncooperative and intoxicated      PAST MEDICAL HISTORY  Past Medical History:   Diagnosis Date   • Cancer (HCC) 1981    cervical   • Alcoholism (HCC)    • Anxiety    • Arthritis    • ASTHMA    • Chronic low back pain    • Congestive heart failure (HCC)    • Depression    • EtOH dependence (HCC)    • Fall     alcohol related   • GERD (gastroesophageal reflux disease)    • HTN    • Hypertension    • Indigestion     GERD   • Muscle disorder    • OSTEOPOROSIS    • Other specified symptom associated with female genital organs     \"I have fibroids bad\"\"   • Psychiatric disorder    • PTSD (post-traumatic stress disorder)    • Renal disorder     Hx of stones   • Seizure " (HCC)     several years ago r/t alcohol withdrawl   • Ulcer    • Vitamin D deficiency        FAMILY HISTORY  Family History   Problem Relation Age of Onset   • Heart Disease Father    • Hypertension Father    • Diabetes Father    • Cancer Paternal Grandmother    • Depression Other    • Lung Disease Neg Hx    • Stroke Neg Hx        SOCIAL HISTORY  Social History     Social History   • Marital status:      Spouse name: N/A   • Number of children: N/A   • Years of education: N/A     Social History Main Topics   • Smoking status: Current Every Day Smoker     Packs/day: 0.50     Years: 20.00     Types: Cigarettes   • Smokeless tobacco: Never Used      Comment: 1/2 pack per day   • Alcohol use Yes      Comment: vodka   • Drug use: No   • Sexual activity: No     Other Topics Concern   • Not on file     Social History Narrative    ** Merged History Encounter **            SURGICAL HISTORY  Past Surgical History:   Procedure Laterality Date   • GASTROSCOPY-ENDO N/A 10/3/2018    Procedure: GASTROSCOPY-ENDO;  Surgeon: Ben Negro M.D.;  Location: ENDOSCOPY [facility];  Service: Gastroenterology   • CYSTOSCOPY STENT PLACEMENT  11/9/2010    Performed by ANGEL HARRIS at [facility]ERY [facility]   • URETEROSCOPY  11/9/2010    Performed by ANGEL HARRIS at [facility]   • LASERTRIPSY  11/9/2010    Performed by ANGEL HARRIS at [facility]   • STENT REMOVAL  11/9/2010    Performed by AGNEL HARRIS at [facility]   • CYSTO STENT PLACEMNT PRE SURG  10/7/2010    Performed by ANGEL HARRIS at [facility]   • HERNIA REPAIR  1977       CURRENT MEDICATIONS  Home Medications    **Home medications have not yet been reviewed for this encounter**         ALLERGIES  Allergies   Allergen Reactions   • Sulfa Drugs Vomiting   • Toradol Vomiting   • Gabapentin      Bowel incontinence     • Amoxicillin Rash     Reported by NAIDA on arrival to ED    Pt states she takes PCN 10/3  "      PHYSICAL EXAM  VITAL SIGNS: /66   Pulse 96   Temp 36.6 °C (97.9 °F) (Temporal)   Resp 20   Ht 1.676 m (5' 6\")   Wt 95 kg (209 lb 7 oz)   LMP 2015   SpO2 91%   BMI 33.80 kg/m²  Room air O2: 93    Constitutional: Well developed, Well nourished, No acute distress, Non-toxic appearance.  Slurred speech, smells of alcohol unkempt  HENT: Normocephalic, Atraumatic, Bilateral external ears normal, Oropharynx moist, No oral exudates, Nose normal.   Eyes: PERRLA, EOMI, Conjunctiva normal, No discharge.   Neck: Normal range of motion, No tenderness, Supple, No stridor.   Cardiovascular: Normal heart rate, Normal rhythm, No murmurs, No rubs, No gallops.   Thorax & Lungs: Normal breath sounds, No respiratory distress, No wheezing, No chest tenderness.  Abdomen: Obese bowel sounds normal, Soft, No tenderness, No masses, No pulsatile masses.    Skin: Warm, Dry, No erythema, No rash.   Extremities: Intact distal pulses, No edema, No tenderness, No cyanosis, No clubbing.   Neurologic: No focal neurologic deficits  Psychiatric: Admits to depression and alcohol abuse        COURSE & MEDICAL DECISION MAKING  Pertinent Labs & Imaging studies reviewed. (See chart for details)  Differential diagnosis: Alcohol intoxication, depression, polysubstance abuse, overdose    Results for orders placed or performed during the hospital encounter of 19   SALICYLATE LEVEL   Result Value Ref Range    Salicylates, Quant. <4 (L) 15 - 25 mg/dL   Acetaminophen Level   Result Value Ref Range    Acetaminophen -Tylenol <10 10 - 30 ug/mL   Blood Alcohol   Result Value Ref Range    Diagnostic Alcohol 0.25 (H) 0.00 g/dL   EKG   Result Value Ref Range    Report       [facility] Emergency Dept.    Test Date:  2019  Pt Name:    LAKSHMI DARLING              Department: [facility]  MRN:        [id number]                      Room:       -ROOM 2  Gender:     Female                       Technician: [id number]  :    "     1960                   Requested By:ARPAN VILLAVICENCIO  Order #:    [id number]                    Reading MD: ARPAN VILLAVICENCIO MD    Measurements  Intervals                                Axis  Rate:       82                           P:          57  MN:         150                          QRS:        11  QRSD:       100                          T:          19  QT:         381  QTc:        445    Interpretive Statements  Sinus rhythm  Abnormal R-wave progression, early transition  Compared to ECG 10/05/2018 04:55:35  No significant changes    Electronically Signed On 3-7-2019 21:08:58 PST by ARPAN VILLAIVCENCIO MD         Patient is currently intoxicated.  She will be watched here until sober and then evaluated by lifeskills.  She has not had any episodes of apnea or hypoxia.  Tylenol and salicylate levels were 0.  Her EKG is unremarkable.    12:29 AM patient is hemodynamically stable and resting comfortably.  She will need to be evaluated by lifeskills when she is sober.    FINAL IMPRESSION  1. Alcoholic intoxication with complication (HCC)    2. Situational depression            Electronically signed by: Arpan Villavicencio, 3/7/2019 5:27 PM

## 2019-03-08 NOTE — ED NOTES
Pt awaken, states she needs some juice. ERP gave ok for oral fluids. Pt provided juice. Pt states she is not suicidal, just has been depressed lately and was drinking alcohol yesterday, did not take ativan. Pt alert and cooperative. Pt provided urine sample, taken to lab. ED tech in to preform breathalyzer, 0.06.

## 2019-03-08 NOTE — ED NOTES
Sitter remains outside room for observation. Patient appears asleep in bed. Regular unlabored respirations visualized.
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
## 2019-03-08 NOTE — ED PROVIDER NOTES
ED Provider Note    Scribed for Satish Ayala M.D. by Kandis Booker. 3/8/2019  10:35 AM    Primary care provider: Pcp Pt States None  Means of arrival: ambulance  History obtained from: EMS and [place] [employer]  History limited by: acute alcohol intoxication    CHIEF COMPLAINT  Chief Complaint   Patient presents with   • Alcohol Intoxication       HPI  Ashleigh Marquis is a 58 y.o. female who presents to the Emergency Department via ambulance for acute alcohol intoxication. EMS was called by a bystander who saw her laying on the ground and thought she had a syncopal event. Upon arrival the patient denies losing consciousness, and no one witnessed her fall. EMS stood her on her feet, but she was very unstable so they brought her in. Upon arrival to the ED she began swinging at people and [place] [employer] was called. Her breathalyzer blew 0.297, and the [place] [facility] doesn't accept people at 0.3 and above. [facility] is requesting medical clearance so she can be brought to long-term.     Further information is limited due to the patient's level of intoxication.     REVIEW OF SYSTEMS  Pertinent positives include Alcohol intoxication.   Pertinent negatives include no loss of consciousness.    Unable to obtain due to acute alcohol intoxication. See HPI for further details.       PAST MEDICAL HISTORY   has a past medical history of Alcoholism ([facility]); Anxiety; Arthritis; ASTHMA; Cancer (HCC) (1981); Chronic low back pain; Congestive heart failure (HCC); Depression; EtOH dependence ([facility]); Fall; GERD (gastroesophageal reflux disease); HTN; Hypertension; Indigestion; Muscle disorder; OSTEOPOROSIS; Other specified symptom associated with female genital organs; Psychiatric disorder; PTSD (post-traumatic stress disorder); Renal disorder; Seizure ([facility]); Ulcer; and Vitamin D deficiency.    SURGICAL HISTORY   has a past surgical history that includes hernia repair (1977); cysto stent placemnt pre surg (10/7/2010); cystoscopy stent placement (11/9/2010);  "ureteroscopy (11/9/2010); lasertripsy (11/9/2010); stent removal (11/9/2010); and gastroscopy-endo (N/A, 10/3/2018).    SOCIAL HISTORY  Social History   Substance Use Topics   • Smoking status: Current Every Day Smoker     Packs/day: 0.50     Years: 20.00     Types: Cigarettes   • Smokeless tobacco: Never Used      Comment: 1/2 pack per day   • Alcohol use Yes      Comment: vodka      History   Drug Use No       FAMILY HISTORY  Family History   Problem Relation Age of Onset   • Heart Disease Father    • Hypertension Father    • Diabetes Father    • Cancer Paternal Grandmother    • Depression Other    • Lung Disease Neg Hx    • Stroke Neg Hx        CURRENT MEDICATIONS  Home Medications     Reviewed by Era Gaspar R.N. (Registered Nurse) on 03/08/19 at 1022  Med List Status: Unable to Obtain   Medication Last Dose Status   acetaminophen (TYLENOL) 325 MG Tab  Active   albuterol 108 (90 Base) MCG/ACT Aero Soln inhalation aerosol  Active   clindamycin (CLEOCIN) 300 MG Cap  Active   DULoxetine (CYMBALTA) 20 MG Cap DR Particles  Active   folic acid (FOLVITE) 1 MG Tab  Active   guaiFENesin LA (MUCINEX) 600 MG TABLET SR 12 HR  Active   lisinopril (PRINIVIL) 10 MG Tab  Active   omeprazole (PRILOSEC) 40 MG delayed-release capsule  Active   ondansetron (ZOFRAN ODT) 4 MG TABLET DISPERSIBLE  Active   potassium chloride SA (KDUR) 20 MEQ Tab CR  Active   sucralfate (CARAFATE) 1 GM/10ML Suspension  Active   thiamine (THIAMINE) 100 MG tablet  Active                ALLERGIES  Allergies   Allergen Reactions   • Sulfa Drugs Vomiting   • Toradol Vomiting   • Gabapentin      Bowel incontinence     • Amoxicillin Rash     Reported by NAIDA on arrival to ED    Pt states she takes PCN 10/3       PHYSICAL EXAM  VITAL SIGNS: /68   Pulse 86   Temp 36.7 °C (98 °F) (Temporal)   Resp 18   Ht 1.626 m (5' 4\")   Wt 94.8 kg (209 lb)   LMP 11/02/2015   SpO2 96%   BMI 35.87 kg/m²     Nursing note and vitals reviewed.  Constitutional: " Well-developed and well-nourished. No distress. Smells of alcohol.  Slurred intoxicated speech.  Awake, alert, and interactive.  HENT: Head is normocephalic and atraumatic. Oropharynx is clear and moist without exudate or erythema.   Eyes: Pupils are equal, round, and reactive to light. Conjunctiva are normal.   Cardiovascular: Normal rate and regular rhythm. No murmur heard. Normal radial pulses.  Pulmonary/Chest: Breath sounds normal. No wheezes or rales.   Abdominal: Soft and non-tender. No distention    Musculoskeletal: Extremities exhibit normal range of motion without edema or tenderness.   Neurological: Awake, alert and interactive, place, and time. No focal deficits noted.  Slurred intoxicated speech.    Skin: Skin is warm and dry. No rash.       COURSE & MEDICAL DECISION MAKING  Nursing notes, VS, PMSFHx reviewed in chart.     Review of past medical records shows the patient multiple visits for alcohol-related complaints    10:35 AM - Patient seen and examined at bedside. Patient is medically cleared and escorted out by [facility] to senior living.      DISPOSITION:  Patient will be discharged home in stable condition.    FOLLOW UP:  [facility], Emergency Dept  [address] [address] [phone]    If symptoms worsen      FINAL IMPRESSION  1. Acute alcoholic intoxication with delirium (HCC)    2. Aggressive behavior          Kandis GAN (Scribe), am scribing for, and in the presence of, Satish Ayala M.D..    Electronically signed by: Kandis Booker (Scribe), 3/8/2019    Satish GAN M.D. personally performed the services described in this documentation, as scribed by Kandis Booker in my presence, and it is both accurate and complete.    C    The note accurately reflects work and decisions made by me.  Satish Ayala  3/8/2019  11:36 AM

## 2019-03-08 NOTE — ED NOTES
Pt repeatedly tearing off oxygen, pulse ox , cardiac leads. Pt informed of their importance, no evidence of learning.

## 2019-03-10 ENCOUNTER — HOSPITAL ENCOUNTER (EMERGENCY)
Facility: MEDICAL CENTER | Age: 59
End: 2019-03-10
Attending: EMERGENCY MEDICINE
Payer: MEDICAID

## 2019-03-10 VITALS
SYSTOLIC BLOOD PRESSURE: 108 MMHG | TEMPERATURE: 97.8 F | HEART RATE: 87 BPM | RESPIRATION RATE: 16 BRPM | OXYGEN SATURATION: 95 % | DIASTOLIC BLOOD PRESSURE: 72 MMHG

## 2019-03-10 DIAGNOSIS — F10.921 ALCOHOL INTOXICATION WITH DELIRIUM (HCC): ICD-10-CM

## 2019-03-10 LAB
ALBUMIN SERPL BCP-MCNC: 3.7 G/DL (ref 3.2–4.9)
ALBUMIN/GLOB SERPL: 1.1 G/DL
ALP SERPL-CCNC: 130 U/L (ref 30–99)
ALT SERPL-CCNC: 21 U/L (ref 2–50)
AMPHET UR QL SCN: NEGATIVE
ANION GAP SERPL CALC-SCNC: 17 MMOL/L (ref 0–11.9)
APPEARANCE UR: CLEAR
AST SERPL-CCNC: 52 U/L (ref 12–45)
BACTERIA #/AREA URNS HPF: ABNORMAL /HPF
BARBITURATES UR QL SCN: NEGATIVE
BASOPHILS # BLD AUTO: 1.2 % (ref 0–1.8)
BASOPHILS # BLD: 0.09 K/UL (ref 0–0.12)
BENZODIAZ UR QL SCN: NEGATIVE
BILIRUB SERPL-MCNC: 0.2 MG/DL (ref 0.1–1.5)
BILIRUB UR QL STRIP.AUTO: NEGATIVE
BUN SERPL-MCNC: 11 MG/DL (ref 8–22)
BZE UR QL SCN: NEGATIVE
CALCIUM SERPL-MCNC: 9.1 MG/DL (ref 8.5–10.5)
CANNABINOIDS UR QL SCN: NEGATIVE
CHLORIDE SERPL-SCNC: 108 MMOL/L (ref 96–112)
CO2 SERPL-SCNC: 17 MMOL/L (ref 20–33)
COLOR UR: YELLOW
CREAT SERPL-MCNC: 0.77 MG/DL (ref 0.5–1.4)
EOSINOPHIL # BLD AUTO: 0.14 K/UL (ref 0–0.51)
EOSINOPHIL NFR BLD: 1.8 % (ref 0–6.9)
EPI CELLS #/AREA URNS HPF: ABNORMAL /HPF
ERYTHROCYTE [DISTWIDTH] IN BLOOD BY AUTOMATED COUNT: 53.9 FL (ref 35.9–50)
ETHANOL BLD-MCNC: 0.31 G/DL
GLOBULIN SER CALC-MCNC: 3.4 G/DL (ref 1.9–3.5)
GLUCOSE SERPL-MCNC: 90 MG/DL (ref 65–99)
GLUCOSE UR STRIP.AUTO-MCNC: NEGATIVE MG/DL
HCT VFR BLD AUTO: 37.6 % (ref 37–47)
HGB BLD-MCNC: 12.2 G/DL (ref 12–16)
IMM GRANULOCYTES # BLD AUTO: 0.01 K/UL (ref 0–0.11)
IMM GRANULOCYTES NFR BLD AUTO: 0.1 % (ref 0–0.9)
KETONES UR STRIP.AUTO-MCNC: NEGATIVE MG/DL
LEUKOCYTE ESTERASE UR QL STRIP.AUTO: ABNORMAL
LYMPHOCYTES # BLD AUTO: 2.54 K/UL (ref 1–4.8)
LYMPHOCYTES NFR BLD: 33.2 % (ref 22–41)
MCH RBC QN AUTO: 31.9 PG (ref 27–33)
MCHC RBC AUTO-ENTMCNC: 32.4 G/DL (ref 33.6–35)
MCV RBC AUTO: 98.2 FL (ref 81.4–97.8)
METHADONE UR QL SCN: NEGATIVE
MICRO URNS: ABNORMAL
MONOCYTES # BLD AUTO: 0.52 K/UL (ref 0–0.85)
MONOCYTES NFR BLD AUTO: 6.8 % (ref 0–13.4)
NEUTROPHILS # BLD AUTO: 4.34 K/UL (ref 2–7.15)
NEUTROPHILS NFR BLD: 56.9 % (ref 44–72)
NITRITE UR QL STRIP.AUTO: NEGATIVE
NRBC # BLD AUTO: 0 K/UL
NRBC BLD-RTO: 0 /100 WBC
OPIATES UR QL SCN: NEGATIVE
OXYCODONE UR QL SCN: NEGATIVE
PCP UR QL SCN: NEGATIVE
PH UR STRIP.AUTO: 6.5 [PH]
PLATELET # BLD AUTO: 185 K/UL (ref 164–446)
PMV BLD AUTO: 9.5 FL (ref 9–12.9)
POTASSIUM SERPL-SCNC: 3.3 MMOL/L (ref 3.6–5.5)
PROPOXYPH UR QL SCN: NEGATIVE
PROT SERPL-MCNC: 7.1 G/DL (ref 6–8.2)
PROT UR QL STRIP: NEGATIVE MG/DL
RBC # BLD AUTO: 3.83 M/UL (ref 4.2–5.4)
RBC # URNS HPF: ABNORMAL /HPF
RBC UR QL AUTO: NEGATIVE
SALICYLATES SERPL-MCNC: 0 MG/DL (ref 15–25)
SODIUM SERPL-SCNC: 142 MMOL/L (ref 135–145)
SP GR UR STRIP.AUTO: 1
UROBILINOGEN UR STRIP.AUTO-MCNC: 0.2 MG/DL
WBC # BLD AUTO: 7.6 K/UL (ref 4.8–10.8)
WBC #/AREA URNS HPF: ABNORMAL /HPF

## 2019-03-10 PROCEDURE — 80307 DRUG TEST PRSMV CHEM ANLYZR: CPT

## 2019-03-10 PROCEDURE — 85025 COMPLETE CBC W/AUTO DIFF WBC: CPT

## 2019-03-10 PROCEDURE — 99284 EMERGENCY DEPT VISIT MOD MDM: CPT

## 2019-03-10 PROCEDURE — 81001 URINALYSIS AUTO W/SCOPE: CPT | Mod: XU

## 2019-03-10 PROCEDURE — 80053 COMPREHEN METABOLIC PANEL: CPT

## 2019-03-11 ENCOUNTER — HOSPITAL ENCOUNTER (EMERGENCY)
Facility: MEDICAL CENTER | Age: 59
End: 2019-03-11
Attending: EMERGENCY MEDICINE
Payer: MEDICAID

## 2019-03-11 VITALS
WEIGHT: 209.44 LBS | BODY MASS INDEX: 35.95 KG/M2 | RESPIRATION RATE: 16 BRPM | DIASTOLIC BLOOD PRESSURE: 65 MMHG | SYSTOLIC BLOOD PRESSURE: 105 MMHG | HEART RATE: 77 BPM | TEMPERATURE: 97.1 F

## 2019-03-11 DIAGNOSIS — F10.920 ALCOHOLIC INTOXICATION WITHOUT COMPLICATION (HCC): ICD-10-CM

## 2019-03-11 PROCEDURE — 99284 EMERGENCY DEPT VISIT MOD MDM: CPT

## 2019-03-11 ASSESSMENT — LIFESTYLE VARIABLES
TOTAL SCORE: 4
DO YOU DRINK ALCOHOL: YES
TOTAL SCORE: 4
EVER FELT BAD OR GUILTY ABOUT YOUR DRINKING: YES
TOTAL SCORE: 4
CONSUMPTION TOTAL: POSITIVE
HAVE YOU EVER FELT YOU SHOULD CUT DOWN ON YOUR DRINKING: YES
ON A TYPICAL DAY WHEN YOU DRINK ALCOHOL HOW MANY DRINKS DO YOU HAVE: 18
HAVE PEOPLE ANNOYED YOU BY CRITICIZING YOUR DRINKING: YES
DOES PATIENT WANT TO STOP DRINKING: YES
AVERAGE NUMBER OF DAYS PER WEEK YOU HAVE A DRINK CONTAINING ALCOHOL: 7
HOW MANY TIMES IN THE PAST YEAR HAVE YOU HAD 5 OR MORE DRINKS IN A DAY: 350
EVER HAD A DRINK FIRST THING IN THE MORNING TO STEADY YOUR NERVES TO GET RID OF A HANGOVER: YES
DOES PATIENT WANT TO TALK TO SOMEONE ABOUT QUITTING: YES

## 2019-03-11 NOTE — ED NOTES
Med rec updated and complete.  Allergie reviewed.  Pt is currently on an antibiotic ( clindamycin)  03/06/19 through 03/13/19. Pt has been taking   It as prescribed.

## 2019-03-11 NOTE — ED PROVIDER NOTES
"ED Provider Note    Scribed for Win Lovett M.D. by Cabrera Baker. 3/10/2019, 5:50 PM.    Primary care provider: Pcp Pt States None  Means of arrival: Ambulance  History obtained from: Patient  History limited by: None    CHIEF COMPLAINT  Chief Complaint   Patient presents with   • Alcohol Intoxication     Per EMS, pt found intoxicated and admitted to throughts of suicide. Sleeping. Responds to verbal/touch but not answer any triage questions. Occasionally yells/moans. Recent frequent visits, armband from [facility]. Pupils equal and reactive, changed into gown.       HPI  Ashleigh Marquis is a 58 y.o. female who presents to the Emergency Department for evaluation of altered mental status.  Patient brought in by EMS and \"found down\".  EMS believes that the patient is intoxicated as the patient has a history of the same.  Patient was arousable to tactile stimulus but otherwise nonverbal.  Per EMS on scene no signs or evidence of trauma when found.  EMS notes blood sugar in the 90s.. No history per patient given her current clinical condition.        REVIEW OF SYSTEMS  See HPI for further details.     PAST MEDICAL HISTORY   has a past medical history of Alcoholism ([facility]); Anxiety; Arthritis; ASTHMA; Cancer (HCC) (1981); Chronic low back pain; Congestive heart failure (HCC); Depression; EtOH dependence ([facility]); Fall; GERD (gastroesophageal reflux disease); HTN; Hypertension; Indigestion; Muscle disorder; OSTEOPOROSIS; Other specified symptom associated with female genital organs; Psychiatric disorder; PTSD (post-traumatic stress disorder); Renal disorder; Seizure ([facility]); Ulcer; and Vitamin D deficiency.    SURGICAL HISTORY   has a past surgical history that includes hernia repair (1977); cysto stent placemnt pre surg (10/7/2010); cystoscopy stent placement (11/9/2010); ureteroscopy (11/9/2010); lasertripsy (11/9/2010); stent removal (11/9/2010); and gastroscopy-endo (N/A, 10/3/2018).    SOCIAL " HISTORY  Social History   Substance Use Topics   • Smoking status: Current Every Day Smoker     Packs/day: 0.50     Years: 20.00     Types: Cigarettes   • Smokeless tobacco: Never Used      Comment: 1/2 pack per day   • Alcohol use Yes      Comment: vodka      History   Drug Use No       FAMILY HISTORY  Family History   Problem Relation Age of Onset   • Heart Disease Father    • Hypertension Father    • Diabetes Father    • Cancer Paternal Grandmother    • Depression Other    • Lung Disease Neg Hx    • Stroke Neg Hx        CURRENT MEDICATIONS  Reviewed.  See Encounter Summary.     ALLERGIES  Allergies   Allergen Reactions   • Sulfa Drugs Vomiting   • Toradol Vomiting   • Gabapentin      Bowel incontinence     • Amoxicillin Rash     Reported by NAIDA on arrival to ED    Pt states she takes PCN 10/3       PHYSICAL EXAM  VITAL SIGNS: BP (!) 99/67   Pulse 94   Temp 36.6 °C (97.8 °F) (Axillary)   Resp 20   LMP 11/02/2015   Constitutional: in no apparent distress.  HENT: No signs of trauma, Bilateral external ears normal, Nose normal.   Eyes: Pupils are equal and reactive, Conjunctiva normal, Non-icteric.   Neck: Normal range of motion, No tenderness, Supple, No stridor.   Cardiovascular: Regular rate and rhythm, no murmurs.   Thorax & Lungs: Normal breath sounds, No respiratory distress, No wheezing, No chest tenderness.   Abdomen: Bowel sounds normal, Soft, No tenderness, No masses, No pulsatile masses. No peritoneal signs.  Skin: Warm, Dry, No erythema, No rash.   Back: No bony tenderness, No CVA tenderness.   Extremities: Intact distal pulses, No edema, No tenderness, No cyanosis  Musculoskeletal: Good range of motion in all major joints passively. No apparent tenderness to palpation or major deformities noted.   Neurologic: Sleeping in bed, arousable to minimal tactile stimulus, will not respond verbally    DIAGNOSTIC STUDIES / PROCEDURES     LABS  Results for orders placed or performed during the hospital  encounter of 03/10/19   URINE DRUG SCREEN (TRIAGE)   Result Value Ref Range    Amphetamines Urine Negative Negative    Barbiturates Negative Negative    Benzodiazepines Negative Negative    Cocaine Metabolite Negative Negative    Methadone Negative Negative    Opiates Negative Negative    Oxycodone Negative Negative    Phencyclidine -Pcp Negative Negative    Propoxyphene Negative Negative    Cannabinoid Metab Negative Negative   Blood Alcohol   Result Value Ref Range    Diagnostic Alcohol 0.31 (H) 0.00 g/dL   COMP METABOLIC PANEL   Result Value Ref Range    Sodium 142 135 - 145 mmol/L    Potassium 3.3 (L) 3.6 - 5.5 mmol/L    Chloride 108 96 - 112 mmol/L    Co2 17 (L) 20 - 33 mmol/L    Anion Gap 17.0 (H) 0.0 - 11.9    Glucose 90 65 - 99 mg/dL    Bun 11 8 - 22 mg/dL    Creatinine 0.77 0.50 - 1.40 mg/dL    Calcium 9.1 8.5 - 10.5 mg/dL    AST(SGOT) 52 (H) 12 - 45 U/L    ALT(SGPT) 21 2 - 50 U/L    Alkaline Phosphatase 130 (H) 30 - 99 U/L    Total Bilirubin 0.2 0.1 - 1.5 mg/dL    Albumin 3.7 3.2 - 4.9 g/dL    Total Protein 7.1 6.0 - 8.2 g/dL    Globulin 3.4 1.9 - 3.5 g/dL    A-G Ratio 1.1 g/dL   SALICYLATE LEVEL   Result Value Ref Range    Salicylates, Quant. 0 (L) 15 - 25 mg/dL   Urinalysis   Result Value Ref Range    Color Yellow     Character Clear     Specific Gravity 1.004 <1.035    Ph 6.5 5.0 - 8.0    Glucose Negative Negative mg/dL    Ketones Negative Negative mg/dL    Protein Negative Negative mg/dL    Bilirubin Negative Negative    Urobilinogen, Urine 0.2 Negative    Nitrite Negative Negative    Leukocyte Esterase Moderate (A) Negative    Occult Blood Negative Negative    Micro Urine Req Microscopic    URINE MICROSCOPIC (W/UA)   Result Value Ref Range    WBC 0-2 /hpf    RBC 0-2 /hpf    Bacteria Few (A) None /hpf    Epithelial Cells Few /hpf   CBC WITH DIFFERENTIAL   Result Value Ref Range    WBC 7.6 4.8 - 10.8 K/uL    RBC 3.83 (L) 4.20 - 5.40 M/uL    Hemoglobin 12.2 12.0 - 16.0 g/dL    Hematocrit 37.6 37.0 - 47.0  %    MCV 98.2 (H) 81.4 - 97.8 fL    MCH 31.9 27.0 - 33.0 pg    MCHC 32.4 (L) 33.6 - 35.0 g/dL    RDW 53.9 (H) 35.9 - 50.0 fL    Platelet Count 185 164 - 446 K/uL    MPV 9.5 9.0 - 12.9 fL    Neutrophils-Polys 56.90 44.00 - 72.00 %    Lymphocytes 33.20 22.00 - 41.00 %    Monocytes 6.80 0.00 - 13.40 %    Eosinophils 1.80 0.00 - 6.90 %    Basophils 1.20 0.00 - 1.80 %    Immature Granulocytes 0.10 0.00 - 0.90 %    Nucleated RBC 0.00 /100 WBC    Neutrophils (Absolute) 4.34 2.00 - 7.15 K/uL    Lymphs (Absolute) 2.54 1.00 - 4.80 K/uL    Monos (Absolute) 0.52 0.00 - 0.85 K/uL    Eos (Absolute) 0.14 0.00 - 0.51 K/uL    Baso (Absolute) 0.09 0.00 - 0.12 K/uL    Immature Granulocytes (abs) 0.01 0.00 - 0.11 K/uL    NRBC (Absolute) 0.00 K/uL   ESTIMATED GFR   Result Value Ref Range    GFR If African American >60 >60 mL/min/1.73 m 2    GFR If Non African American >60 >60 mL/min/1.73 m 2     All labs were reviewed by me.    RADIOLOGY  No orders to display     The radiologist's interpretation of all radiological studies and images have been reviewed by me.    COURSE & MEDICAL DECISION MAKING  Pertinent Labs & Imaging studies reviewed. (See chart for details)    5:50 PM - Patient seen and examined at bedside. Ordered urine drug screen, blood alcoho, CBC, CMP, salicylate level and UA to evaluate her symptoms.     9:54 PM -  I reevaluated the patient and she was resting comfortably. I informed her of her lab results. The patient is advised to follow up with her PCP and to return for further evaluation should the patient's symptoms worsen. The patient understands and agrees to discharge home.    Decision Making:  This is a 58 y.o. year old female who presents with above complaint.  Evidence of alcohol intoxication.  She has improved clinically and is now able for discharge.  I have talked to her about need to decrease her overall alcohol intake to a responsible level.  She is understanding return precautions if needed.    The patient  will return for new or worsening symptoms and is stable at the time of discharge.    DISPOSITION:  Patient will be discharged home in stable condition.    FOLLOW UP:  [facility], Emergency Dept  [address]  [place] [address] [phone]    If symptoms worsen; drink less alcohol      OUTPATIENT MEDICATIONS:  New Prescriptions    No medications on file         FINAL IMPRESSION  1. Alcohol intoxication with delirium (HCC)          Cabrera GAN (Scribe), am scribing for, and in the presence of, Win Lovett M.D..    Electronically signed by: Cabrera Baker (Scribe), 3/10/2019    Win GAN M.D. personally performed the services described in this documentation, as scribed by Cabrera Baker in my presence, and it is both accurate and complete.     C    The note accurately reflects work and decisions made by me.  Win Lovett  3/11/2019  2:16 AM

## 2019-03-11 NOTE — ED TRIAGE NOTES
Pt arrived VIA EMS with ETOH intoxication.  Medic states pt was at [facility] and was to go to [place] for detox. When EMS arrived to trasport her there she was too intoxicated and brought here instead.

## 2019-03-11 NOTE — ED TRIAGE NOTES
Chief Complaint   Patient presents with   • Alcohol Intoxication     Per EMS, pt found intoxicated and admitted to throughts of suicide. Sleeping. Responds to verbal/touch but not answer any triage questions. Occasionally yells/moans. Recent frequent visits, armband from [facility]. Pupils equal and reactive, changed into gown.
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
## 2019-03-11 NOTE — ED NOTES
Report from patty day.  Pt sleeping, pt arousable.  Pt refusing to wear pulse ox per day rn.  Lab called for blood draw.

## 2019-03-11 NOTE — ED NOTES
"Pt refusing to wear SpO2 monitor.  Pt up to bedside with steady gait to unplug machine.  Pt refusing to speak to this RN, states \"I'm going to sleep.  Leave me alone, tona.\"  "

## 2019-03-11 NOTE — ED NOTES
Pt yelling and swearing at RN.  Pt educated on appropriate behaviors with staff.  Informed security would escort her out if she did not change her behaviors.  Pt departed with incident.  ED care complete

## 2019-03-11 NOTE — ED PROVIDER NOTES
"ED Provider Note    CHIEF COMPLAINT  Chief Complaint   Patient presents with   • Alcohol Intoxication       HPI  Ashleigh Marquis is a 58 y.o. female who presents to the emergency department with suspected alcohol intoxication.  There is plan for patient to go to alcohol detox at [place] today.  Paramedics arrived to [facility] to pick the patient up and found her with altered mental status.  She admits to drinking this morning and last night.  It is noted that this is the patient's sixth visit to the emergency department of this month.  All visits have been for alcohol intoxication.  Patient denies any medical complaints.  Denies trauma.  She says she feels cold and that her feet are cold.  She has not had fevers or chills.  No cough or difficulty breathing.  No abdominal pain nausea vomiting diarrhea.    REVIEW OF SYSTEMS  As per HPI, otherwise a 10 point review of systems is negative    PAST MEDICAL HISTORY  Past Medical History:   Diagnosis Date   • Alcoholism (HCC)    • Anxiety    • Arthritis    • ASTHMA    • Cancer (HCC) 1981    cervical   • Chronic low back pain    • Congestive heart failure (HCC)    • Depression    • EtOH dependence (HCC)    • Fall     alcohol related   • GERD (gastroesophageal reflux disease)    • HTN    • Hypertension    • Indigestion     GERD   • Muscle disorder    • OSTEOPOROSIS    • Other specified symptom associated with female genital organs     \"I have fibroids bad\"\"   • Psychiatric disorder    • PTSD (post-traumatic stress disorder)    • Renal disorder     Hx of stones   • Seizure (HCC)     several years ago r/t alcohol withdrawl   • Ulcer    • Vitamin D deficiency        SOCIAL HISTORY  Social History   Substance Use Topics   • Smoking status: Current Every Day Smoker     Packs/day: 0.50     Years: 20.00     Types: Cigarettes   • Smokeless tobacco: Never Used      Comment: 1/2 pack per day   • Alcohol use Yes      Comment: [email or web address]       SURGICAL HISTORY  Past " Surgical History:   Procedure Laterality Date   • GASTROSCOPY-ENDO N/A 10/3/2018    Procedure: GASTROSCOPY-ENDO;  Surgeon: Ben Negro M.D.;  Location: ENDOSCOPY [facility];  Service: Gastroenterology   • CYSTOSCOPY STENT PLACEMENT  11/9/2010    Performed by ANGEL HARRIS at SURGERY [facility]   • URETEROSCOPY  11/9/2010    Performed by ANGEL HARRIS at SURGERY [facility]   • LASERTRIPSY  11/9/2010    Performed by ANGEL HARRIS at [facility]   • STENT REMOVAL  11/9/2010    Performed by ANGEL HARRIS at [facility]   • CYSTO STENT PLACEMNT PRE SURG  10/7/2010    Performed by ANGEL HARRIS at [facility]   • HERNIA REPAIR  1977       CURRENT MEDICATIONS  Home Medications     Reviewed by Albin Logan R.N. (Registered Nurse) on 03/11/19 at 0927  Med List Status: Not Addressed   Medication Last Dose Status   lisinopril (PRINIVIL) 10 MG Tab 3/10/2019 Active                ALLERGIES  Allergies   Allergen Reactions   • Sulfa Drugs Vomiting   • Toradol Vomiting   • Gabapentin      Bowel incontinence     • Amoxicillin Rash     Reported by NAIDA on arrival to ED    Pt states she takes PCN 10/3       PHYSICAL EXAM  VITAL SIGNS: /68   Pulse 80   Temp 36.2 °C (97.1 °F) (Temporal)   Resp 16   Wt 95 kg (209 lb 7 oz)   LMP 11/02/2015   BMI 35.95 kg/m²     Constitutional: She is somewhat somnolent but arousable.  She periodically yells out for another blanket.  She is disheveled.  HENT:  Atraumatic, Normocephalic.Oropharynx moist mucus membranes, Nose normal inspection.   Eyes: Normal inspection  Neck: Supple  Cardiovascular: Normal heart rate, Normal rhythm.  Symmetric peripheral pulses.   Thorax & Lungs: No respiratory distress, No wheezing, No rales, No rhonchi, No chest tenderness.   Abdomen: Bowel sounds normal, soft, non-distended, nontender, no mass  Skin: Warm, Dry, No rash.   Back: No tenderness, No CVA tenderness.   Extremities: No clubbing,  cyanosis, edema, no Homans or cords   Neurologic: Slow deliberate slurred speech consistent with alcohol use.  Moves all extremities symmetrically.    Reviewed the medical record as noted in the HPI.  Laboratory evaluation yesterday was unremarkable.    COURSE & MEDICAL DECISION MAKING  Patient presents to the ER again intoxicated.  She will be allowed to sober.  She does not have any medical complaints or findings on examination.  When clinically sober and steady on her feet she will be discharged.      FINAL IMPRESSION  1.  Alcohol intoxication      This dictation was created using voice recognition software. The accuracy of the dictation is limited to the abilities of the software.  The nursing notes were reviewed and certain aspects of this information were incorporated into this note.      Electronically signed by: Romero Light, 3/11/2019 9:45 AM

## 2019-03-11 NOTE — ED NOTES
Pt ambulatory  Vital signs stable  Pt handed d/c paperwork with understanding stated  Pt  states safe way home

## 2019-03-31 ENCOUNTER — HOSPITAL ENCOUNTER (EMERGENCY)
Facility: MEDICAL CENTER | Age: 59
End: 2019-03-31
Attending: EMERGENCY MEDICINE
Payer: MEDICAID

## 2019-03-31 VITALS
HEIGHT: 64 IN | RESPIRATION RATE: 16 BRPM | OXYGEN SATURATION: 93 % | WEIGHT: 209.44 LBS | BODY MASS INDEX: 35.76 KG/M2 | TEMPERATURE: 98.6 F | HEART RATE: 107 BPM

## 2019-03-31 VITALS
RESPIRATION RATE: 16 BRPM | HEART RATE: 95 BPM | SYSTOLIC BLOOD PRESSURE: 110 MMHG | TEMPERATURE: 98.2 F | OXYGEN SATURATION: 95 % | WEIGHT: 202.82 LBS | DIASTOLIC BLOOD PRESSURE: 65 MMHG | BODY MASS INDEX: 34.81 KG/M2

## 2019-03-31 DIAGNOSIS — F43.21 SITUATIONAL DEPRESSION: ICD-10-CM

## 2019-03-31 DIAGNOSIS — F10.921 ALCOHOL INTOXICATION WITH DELIRIUM (HCC): ICD-10-CM

## 2019-03-31 DIAGNOSIS — F10.920 ALCOHOLIC INTOXICATION WITHOUT COMPLICATION (HCC): ICD-10-CM

## 2019-03-31 DIAGNOSIS — E16.2 HYPOGLYCEMIA: ICD-10-CM

## 2019-03-31 DIAGNOSIS — T50.902A INTENTIONAL DRUG OVERDOSE, INITIAL ENCOUNTER (HCC): ICD-10-CM

## 2019-03-31 LAB
AMPHET UR QL SCN: NEGATIVE
BARBITURATES UR QL SCN: POSITIVE
BENZODIAZ UR QL SCN: POSITIVE
BZE UR QL SCN: NEGATIVE
CANNABINOIDS UR QL SCN: NEGATIVE
GLUCOSE BLD-MCNC: 63 MG/DL (ref 65–99)
GLUCOSE BLD-MCNC: 71 MG/DL (ref 65–99)
GLUCOSE BLD-MCNC: 75 MG/DL (ref 65–99)
GLUCOSE BLD-MCNC: 96 MG/DL (ref 65–99)
METHADONE UR QL SCN: NEGATIVE
OPIATES UR QL SCN: NEGATIVE
OXYCODONE UR QL SCN: NEGATIVE
PCP UR QL SCN: NEGATIVE
POC BREATHALIZER: 0.24 PERCENT (ref 0–0.01)
PROPOXYPH UR QL SCN: NEGATIVE

## 2019-03-31 PROCEDURE — 90791 PSYCH DIAGNOSTIC EVALUATION: CPT

## 2019-03-31 PROCEDURE — 99284 EMERGENCY DEPT VISIT MOD MDM: CPT

## 2019-03-31 PROCEDURE — A9270 NON-COVERED ITEM OR SERVICE: HCPCS | Performed by: EMERGENCY MEDICINE

## 2019-03-31 PROCEDURE — 700102 HCHG RX REV CODE 250 W/ 637 OVERRIDE(OP): Performed by: EMERGENCY MEDICINE

## 2019-03-31 PROCEDURE — 302970 POC BREATHALIZER: Performed by: EMERGENCY MEDICINE

## 2019-03-31 PROCEDURE — 80307 DRUG TEST PRSMV CHEM ANLYZR: CPT

## 2019-03-31 PROCEDURE — 82962 GLUCOSE BLOOD TEST: CPT | Mod: 91

## 2019-03-31 PROCEDURE — 99285 EMERGENCY DEPT VISIT HI MDM: CPT | Mod: 25

## 2019-03-31 RX ORDER — DEXTROSE MONOHYDRATE 25 G/50ML
50 INJECTION, SOLUTION INTRAVENOUS ONCE
Status: DISCONTINUED | OUTPATIENT
Start: 2019-03-31 | End: 2019-03-31 | Stop reason: HOSPADM

## 2019-03-31 RX ORDER — CLONAZEPAM 0.5 MG/1
0.5 TABLET ORAL DAILY
Status: ON HOLD | COMMUNITY
End: 2019-04-24

## 2019-03-31 RX ORDER — OMEPRAZOLE 20 MG/1
20 CAPSULE, DELAYED RELEASE ORAL DAILY
Status: DISCONTINUED | OUTPATIENT
Start: 2019-03-31 | End: 2019-03-31 | Stop reason: HOSPADM

## 2019-03-31 RX ORDER — LORAZEPAM 2 MG/1
2 TABLET ORAL 3 TIMES DAILY
COMMUNITY
End: 2019-04-16 | Stop reason: CLARIF

## 2019-03-31 RX ORDER — LORAZEPAM 1 MG/1
1 TABLET ORAL ONCE
Status: COMPLETED | OUTPATIENT
Start: 2019-03-31 | End: 2019-03-31

## 2019-03-31 RX ORDER — SPIRONOLACTONE 25 MG/1
25 TABLET ORAL DAILY
Status: ON HOLD | COMMUNITY
End: 2019-04-24

## 2019-03-31 RX ORDER — PANTOPRAZOLE SODIUM 40 MG/1
40 TABLET, DELAYED RELEASE ORAL DAILY
COMMUNITY
End: 2019-04-16 | Stop reason: CLARIF

## 2019-03-31 RX ADMIN — LORAZEPAM 1 MG: 1 TABLET ORAL at 18:10

## 2019-03-31 RX ADMIN — OMEPRAZOLE 20 MG: 20 CAPSULE, DELAYED RELEASE ORAL at 11:15

## 2019-03-31 ASSESSMENT — ENCOUNTER SYMPTOMS
HEADACHES: 0
VOMITING: 0
SHORTNESS OF BREATH: 0
BACK PAIN: 0
ABDOMINAL PAIN: 0
FEVER: 0
CHILLS: 0
NAUSEA: 0

## 2019-03-31 ASSESSMENT — LIFESTYLE VARIABLES
TOTAL SCORE: 2
HOW MANY TIMES IN THE PAST YEAR HAVE YOU HAD 5 OR MORE DRINKS IN A DAY: 365
EVER HAD A DRINK FIRST THING IN THE MORNING TO STEADY YOUR NERVES TO GET RID OF A HANGOVER: YES
AVERAGE NUMBER OF DAYS PER WEEK YOU HAVE A DRINK CONTAINING ALCOHOL: 7
DOES PATIENT WANT TO STOP DRINKING: NO
HAVE YOU EVER FELT YOU SHOULD CUT DOWN ON YOUR DRINKING: NO
EVER FELT BAD OR GUILTY ABOUT YOUR DRINKING: NO
CONSUMPTION TOTAL: POSITIVE
HAVE PEOPLE ANNOYED YOU BY CRITICIZING YOUR DRINKING: YES
TOTAL SCORE: 2
ON A TYPICAL DAY WHEN YOU DRINK ALCOHOL HOW MANY DRINKS DO YOU HAVE: 10
DO YOU DRINK ALCOHOL: YES
TOTAL SCORE: 2
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
## 2019-03-31 NOTE — ED PROVIDER NOTES
ED Provider Note    Scribed for Satish Ayala M.D. by Jhonahtan Brooke. 3/31/2019  9:03 AM    Primary care provider: Pcp Pt States None  Means of arrival: EMS  History obtained from: Nurse  History limited by: Patients intoxicated state    CHIEF COMPLAINT  Chief Complaint   Patient presents with   • Legal 2000     suicidal ideation   • Drug Overdose     clonazepam and alcohol       HPI  Ashleigh Marquis is a 58 y.o. female who presents to the Emergency Department after being brought in by EMS for complaints of suicidal ideation and alcohol intoxication. Per Nurse, Ashleigh took 10-12 clonazepam and drank 2 pints of vodka, however they note that her pill count is correct at this time.    HPI limited secondary to patients intoxicated state      Ms. Marquis was here with a confirmed overdose of T42 - Antiepileptic, sedative-hypnotic, or anti-Parkinsonism drugs; she has no other known overdoses to me at this time.      REVIEW OF SYSTEMS  Pertinent positives include substance abuse, suicidal ideations.   See HPI for further details.   ROS limited secondary to patients alcohol intoxication      PAST MEDICAL HISTORY   has a past medical history of Alcoholism (HCC); Anxiety; Arthritis; ASTHMA; Cancer (HCC) (1981); Chronic low back pain; Congestive heart failure (HCC); Depression; EtOH dependence ([facility]); Fall; GERD (gastroesophageal reflux disease); HTN; Hypertension; Indigestion; Muscle disorder; OSTEOPOROSIS; Other specified symptom associated with female genital organs; Psychiatric disorder; PTSD (post-traumatic stress disorder); Renal disorder; Seizure ([facility]); Ulcer; and Vitamin D deficiency.    SURGICAL HISTORY   has a past surgical history that includes hernia repair (1977); cysto stent placemnt pre surg (10/7/2010); cystoscopy stent placement (11/9/2010); ureteroscopy (11/9/2010); lasertripsy (11/9/2010); stent removal (11/9/2010); and gastroscopy-endo (N/A, 10/3/2018).    SOCIAL HISTORY  Social History   Substance  Use Topics   • Smoking status: Current Every Day Smoker     Packs/day: 0.50     Years: 20.00     Types: Cigarettes   • Smokeless tobacco: Never Used      Comment: 1/2 pack per day   • Alcohol use Yes      Comment: vodka      History   Drug Use No       FAMILY HISTORY  Family History   Problem Relation Age of Onset   • Heart Disease Father    • Hypertension Father    • Diabetes Father    • Cancer Paternal Grandmother    • Depression Other    • Lung Disease Neg Hx    • Stroke Neg Hx        CURRENT MEDICATIONS  Home Medications     Reviewed by Nagi Morgan R.N. (Registered Nurse) on 03/31/19 at 0901  Med List Status: <None>   Medication Last Dose Status   lisinopril (PRINIVIL) 10 MG Tab  Active                ALLERGIES  Allergies   Allergen Reactions   • Sulfa Drugs Vomiting   • Toradol Vomiting   • Gabapentin      Bowel incontinence     • Amoxicillin Rash     Reported by NAIDA on arrival to ED    Pt states she takes PCN 10/3       PHYSICAL EXAM  VITAL SIGNS: /62   Pulse 71   Temp 36.6 °C (97.9 °F) (Temporal)   Resp 14   Wt 92 kg (202 lb 13.2 oz)   LMP 11/02/2015   SpO2 94%   BMI 34.81 kg/m²     Nursing note and vitals reviewed.  Constitutional: Well-developed, Smells of alcohol, chronically ill appearing  HENT: Head is normocephalic and atraumatic.  Cardiovascular: Normal rate and regular rhythm. No murmur heard.  Pulmonary/Chest: Breath sounds normal. No wheezes or rales.   Abdominal: Soft and non-tender. No distention    Musculoskeletal: Extremities exhibit normal range of motion without edema or tenderness.   Neurological: Alcohol intoxication, slurred speech  Skin: Skin is warm and dry. No rash.     DIAGNOSTIC STUDIES / PROCEDURES    LABS  Results for orders placed or performed during the hospital encounter of 03/31/19   POC BREATHALIZER   Result Value Ref Range    POC Breathalizer 0.243 (A) 0.00 - 0.01 Percent     All labs reviewed by me.      COURSE & MEDICAL DECISION MAKING  Nursing notes, VS,  PMSFHx reviewed in chart.     Review of past medical records shows the patient was seen here earlier this morning for alcohol intoxication as well.    9:03 AM - Patient seen and examined at bedside.  Ordered POC breathalizer, Urine drug screen to evaluate her symptoms. The differential diagnoses include but are not limited to: Suicidal ideation, alcohol intoxication, benzodiazepine overdose. Will continue to evaluate patient here in the ER.    10:50 AM - Patient reevaluated who complains of stomach pain. Will treat with omeprazole    Patient presents today immediately after being discharged from the emergency room.  Appears the patient consumed a large amount of alcohol.  She says that she also overdosed on clonazepam as a suicide attempt.  She denies any other ingestions.  No other medications were found with the patient.  Pill count for the clonazepam is seems seems to be appropriate.  Patient smells of alcohol.    11:30 AM the patient will be observed in the emergency department until she is sober at which time she will be evaluated by lifeskills.      FINAL IMPRESSION  1. Alcohol intoxication with delirium (HCC)    2. Intentional drug overdose, initial encounter (HCC)    3. Suicidal ideation          Jhonathan GAN (Cayetano), am scribing for, and in the presence of, Satish Ayala M.D..    Electronically signed by: Jhonathan Brooke (Cayetano), 3/31/2019    Satish GAN M.D. personally performed the services described in this documentation, as scribed by Jhonathan Brooke in my presence, and it is both accurate and complete. E.    The note accurately reflects work and decisions made by me.  Satish Ayala  3/31/2019  11:30 AM

## 2019-03-31 NOTE — ED NOTES
"Pt brought over from [facility]. Pt lying supine in stretcher. Pt awakens with verbal stimuli easily. Pt states she took \"maybe nine clonazepam tablets\" while drinking alcohol. Pt denies this was an attempt to kill self. States \"I am just stupid.\" pt admits to daily ETOH. Cooperative at this time.     Ruel (ED tech) watching pt d/t L2K filed by .   "

## 2019-03-31 NOTE — ED NOTES
After multiple failed US guided attempts to gain access, pt tolerated PO juice. Will recheck fsbs at 0415.

## 2019-03-31 NOTE — ED NOTES
Attempted to provide food and juice to pt. Pt screamed at this RN and threw open juice container at wall.

## 2019-03-31 NOTE — ED TRIAGE NOTES
Pt to [facility] via [facility] with c/o suicidal ideation and etoh intoxication.  Pt reports taking 10-12 clonazepam and 2 pints of vodka

## 2019-03-31 NOTE — ED NOTES
Med Rec complete per Pt at bedside  Allergies Reviewed  No ABX in the last 30 days    Pt filled CLONAZEPAM 0.5 mg #30 on 3/14  And LORAZEPAM 2 mg #45 on 3/01

## 2019-03-31 NOTE — ED PROVIDER NOTES
ED Provider Note    Scribed for JACE Tucker II* by Edwardo Alvares. 3/31/2019  1:04 AM    Means of Arrival: EMS via gurney  History obtained by: patient  Limitations: alcohol intoxication    CHIEF COMPLAINT  Chief Complaint   Patient presents with   • Alcohol Intoxication     BIB EMS from bus stop, per EMS security called d/t pt being intoxicated and nonambulatory.        HPI  Ashleigh Marquis is a 58 y.o. female who presents to the Emergency Department due to being found sleeping outside. Patient frequents this ED with the past several visits being for acute alcohol intoxication as well. For this visit, security called EMS on the patient for being intoxicated and non-ambulatory at a bus stop. Patient was walking around the room upon initial evaluation and not slurring, and she then began slurring her speech and laid on the bed asking for a blanket. She refuses to provide me with any history of todays events. Demanding blanket.     She has paper work with her from [employer] from today that says alcohol intoxication and hypoglycemia.     REVIEW OF SYSTEMS  Review of Systems   Reason unable to perform ROS: alcohol intoxcation.   Constitutional: Negative for chills and fever.        Alcohol use   HENT: Negative for congestion.    Respiratory: Negative for shortness of breath.    Cardiovascular: Negative for chest pain.   Gastrointestinal: Negative for abdominal pain, nausea and vomiting.   Musculoskeletal: Negative for back pain.   Neurological: Negative for headaches.   Psychiatric/Behavioral: Negative for suicidal ideas.   All other systems reviewed and are negative.    See HPI for further details.     PAST MEDICAL HISTORY   has a past medical history of Alcoholism ([facility]); Anxiety; Arthritis; ASTHMA; Cancer ([facility]) (1981); Chronic low back pain; Congestive heart failure ([facility]); Depression; EtOH dependence ([facility]); Fall; GERD (gastroesophageal reflux disease); HTN; Hypertension; Indigestion; Muscle disorder;  "OSTEOPOROSIS; Other specified symptom associated with female genital organs; Psychiatric disorder; PTSD (post-traumatic stress disorder); Renal disorder; Seizure (HCC); Ulcer; and Vitamin D deficiency.    SOCIAL HISTORY  Social History     Social History Main Topics   • Smoking status: Current Every Day Smoker     Packs/day: 0.50     Years: 20.00     Types: Cigarettes   • Smokeless tobacco: Never Used      Comment: 1/2 pack per day   • Alcohol use Yes      Comment: vodka   • Drug use: No   • Sexual activity: No       SURGICAL HISTORY   has a past surgical history that includes hernia repair (1977); cysto stent placemnt pre surg (10/7/2010); cystoscopy stent placement (11/9/2010); ureteroscopy (11/9/2010); lasertripsy (11/9/2010); stent removal (11/9/2010); and gastroscopy-endo (N/A, 10/3/2018).    CURRENT MEDICATIONS  Home Medications     Reviewed by Joseph Cook R.N. (Registered Nurse) on 03/31/19 at 0100  Med List Status: Partial   Medication Last Dose Status   lisinopril (PRINIVIL) 10 MG Tab  Active                ALLERGIES  Allergies   Allergen Reactions   • Sulfa Drugs Vomiting   • Toradol Vomiting   • Gabapentin      Bowel incontinence     • Amoxicillin Rash     Reported by NAIDA on arrival to ED    Pt states she takes PCN 10/3       PHYSICAL EXAM  VITAL SIGNS: Pulse 65   Temp 37 °C (98.6 °F) (Temporal)   Ht 1.626 m (5' 4\")   Wt 95 kg (209 lb 7 oz)   LMP 11/02/2015   SpO2 99%   BMI 35.95 kg/m²    Pulse ox interpretation: I interpret this pulse ox as normal.  Constitutional: Obese woman, in no distress ambulatory in room going through her belongings.   HENT: Normocephalic, Atraumatic, Bilateral external ears normal. Nose normal.   Neck: Supple, no stridor.   Eyes: Pupils are equal. Conjunctiva normal, non-icteric.   Heart: Regular rate and rhythm, no murmurs.    Lungs: No respiratory distress, regular respirations. Clear to auscultation bilaterally.  Abdomen: Normal appearance, nondistended, " nontender.  Skin: Warm, Dry, No erythema, No rash.   Neurologic: Prior to examination, patient was standing up and speaking normally. Upon walking into the room, patient laid down on the bed. Slurred speech at times but when requesting food or blanket she speaks loudly and clearly without no slur.   Psychiatric: Not suicidal. Frequently argues with intervention plan.     COURSE & MEDICAL DECISION MAKING  Pertinent Labs & Imaging studies reviewed. (See chart for details)    1:04 AM This is an emergent evaluation of a 58 y.o., female who presents with acute alcohol intoxication and the differential diagnosis includes but is not limited to acute alcohol intoxication and possibly hypoglycemia. Ordered for POCT glucose to evaluate. She is ambulatory with steady gait, going through her belongings.     1:12 AM - Patient's POCT glucose was 71. She will be provided with juice to adjust her blood glucose levels.     1:16 AM - Patient is currently ambulatory with a mostly steady gait and openly ready to accept food. She is speaking in full sentences to the staff. However, upon receiving her meal, she threw the open juice container at the wall.     2:36 AM - Patient's repeat blood glucose was 63. She is now having shaking, saying she is cold. We will place an IV to give glucose and recheck blood glucose levels. She is refusing to eat meal or drink juice.     4:25 AM  Attempted multiple times to place peripheral IV. I tried under ultrasound guidance. No visible veins in left arm amenable to IV catheter. Right arm visible veins but infiltration after threading catheter. She finally conceded to oral intake and drank several cups of juice. Glucose now near 100. Will discharge now. I suspect hypoglycemia is from poor PO intake and frequent alcohol use.     The patient will return for worsening symptoms and is stable at the time of discharge. The patient verbalizes understanding and will comply.    The patient is referred to a  primary physician for blood pressure management, diabetic screening, and for all other preventative health concerns.      FINAL IMPRESSION  1. Alcoholic intoxication without complication (HCC)    2. Hypoglycemia           Edwardo GAN (Scribe), am scribing for, and in the presence of, KIYA Tucker II.    Electronically signed by: Edwardo Alvares (Scribe), 3/31/2019    IStefan II, M* personally performed the services described in this documentation, as scribed by Edwardo Alvares in my presence, and it is both accurate and complete. E    The note accurately reflects work and decisions made by me.  Stefan Villafana II  3/31/2019  4:24 AM

## 2019-03-31 NOTE — ED NOTES
Assist RN: ERP Dr. Villafana at bedside. Patient laying in R lateral position on gurney speaking with ERP in full sentences. ERP attempting to hand-feed patient boxed lunch. Patient minimally cooperative - continuing to speak in full sentences with ERP while eating few bites of food.

## 2019-03-31 NOTE — ED NOTES
Failed PIV attempt x 2 and one failed EJ attempt. Dr. Villafana at bedside performing US guided PIV.

## 2019-04-01 ENCOUNTER — APPOINTMENT (OUTPATIENT)
Dept: RADIOLOGY | Facility: MEDICAL CENTER | Age: 59
End: 2019-04-01
Attending: EMERGENCY MEDICINE
Payer: MEDICAID

## 2019-04-01 ENCOUNTER — HOSPITAL ENCOUNTER (EMERGENCY)
Facility: MEDICAL CENTER | Age: 59
End: 2019-04-01
Attending: EMERGENCY MEDICINE
Payer: MEDICAID

## 2019-04-01 VITALS
WEIGHT: 202 LBS | SYSTOLIC BLOOD PRESSURE: 135 MMHG | OXYGEN SATURATION: 95 % | DIASTOLIC BLOOD PRESSURE: 74 MMHG | RESPIRATION RATE: 16 BRPM | BODY MASS INDEX: 34.49 KG/M2 | TEMPERATURE: 98 F | HEART RATE: 92 BPM | HEIGHT: 64 IN

## 2019-04-01 DIAGNOSIS — F19.10 POLYSUBSTANCE ABUSE (HCC): ICD-10-CM

## 2019-04-01 DIAGNOSIS — F32.A DEPRESSION, UNSPECIFIED DEPRESSION TYPE: ICD-10-CM

## 2019-04-01 DIAGNOSIS — F10.920 ALCOHOLIC INTOXICATION WITHOUT COMPLICATION (HCC): ICD-10-CM

## 2019-04-01 LAB
ALBUMIN SERPL BCP-MCNC: 4 G/DL (ref 3.2–4.9)
ALBUMIN/GLOB SERPL: 1.3 G/DL
ALP SERPL-CCNC: 150 U/L (ref 30–99)
ALT SERPL-CCNC: 30 U/L (ref 2–50)
AMPHET UR QL SCN: NEGATIVE
ANION GAP SERPL CALC-SCNC: 13 MMOL/L (ref 0–11.9)
APTT PPP: 25 SEC (ref 24.7–36)
AST SERPL-CCNC: 41 U/L (ref 12–45)
BARBITURATES UR QL SCN: POSITIVE
BASOPHILS # BLD AUTO: 2.3 % (ref 0–1.8)
BASOPHILS # BLD: 0.13 K/UL (ref 0–0.12)
BENZODIAZ UR QL SCN: POSITIVE
BILIRUB SERPL-MCNC: 0.2 MG/DL (ref 0.1–1.5)
BUN SERPL-MCNC: 11 MG/DL (ref 8–22)
BZE UR QL SCN: NEGATIVE
CALCIUM SERPL-MCNC: 8.9 MG/DL (ref 8.5–10.5)
CANNABINOIDS UR QL SCN: NEGATIVE
CHLORIDE SERPL-SCNC: 104 MMOL/L (ref 96–112)
CO2 SERPL-SCNC: 24 MMOL/L (ref 20–33)
CREAT SERPL-MCNC: 0.69 MG/DL (ref 0.5–1.4)
EOSINOPHIL # BLD AUTO: 0.06 K/UL (ref 0–0.51)
EOSINOPHIL NFR BLD: 1.1 % (ref 0–6.9)
ERYTHROCYTE [DISTWIDTH] IN BLOOD BY AUTOMATED COUNT: 55 FL (ref 35.9–50)
ETHANOL BLD-MCNC: 0.37 G/DL
GLOBULIN SER CALC-MCNC: 3.1 G/DL (ref 1.9–3.5)
GLUCOSE SERPL-MCNC: 83 MG/DL (ref 65–99)
HCT VFR BLD AUTO: 41 % (ref 37–47)
HGB BLD-MCNC: 13.1 G/DL (ref 12–16)
IMM GRANULOCYTES # BLD AUTO: 0.02 K/UL (ref 0–0.11)
IMM GRANULOCYTES NFR BLD AUTO: 0.4 % (ref 0–0.9)
INR PPP: 0.96 (ref 0.87–1.13)
LIPASE SERPL-CCNC: 55 U/L (ref 11–82)
LYMPHOCYTES # BLD AUTO: 2.8 K/UL (ref 1–4.8)
LYMPHOCYTES NFR BLD: 49.1 % (ref 22–41)
MCH RBC QN AUTO: 31.4 PG (ref 27–33)
MCHC RBC AUTO-ENTMCNC: 32 G/DL (ref 33.6–35)
MCV RBC AUTO: 98.3 FL (ref 81.4–97.8)
METHADONE UR QL SCN: NEGATIVE
MONOCYTES # BLD AUTO: 0.34 K/UL (ref 0–0.85)
MONOCYTES NFR BLD AUTO: 6 % (ref 0–13.4)
NEUTROPHILS # BLD AUTO: 2.35 K/UL (ref 2–7.15)
NEUTROPHILS NFR BLD: 41.1 % (ref 44–72)
NRBC # BLD AUTO: 0 K/UL
NRBC BLD-RTO: 0 /100 WBC
OPIATES UR QL SCN: NEGATIVE
OXYCODONE UR QL SCN: NEGATIVE
PCP UR QL SCN: NEGATIVE
PLATELET # BLD AUTO: 590 K/UL (ref 164–446)
PMV BLD AUTO: 9.4 FL (ref 9–12.9)
POTASSIUM SERPL-SCNC: 3.4 MMOL/L (ref 3.6–5.5)
PROPOXYPH UR QL SCN: NEGATIVE
PROT SERPL-MCNC: 7.1 G/DL (ref 6–8.2)
PROTHROMBIN TIME: 12.9 SEC (ref 12–14.6)
RBC # BLD AUTO: 4.17 M/UL (ref 4.2–5.4)
SODIUM SERPL-SCNC: 141 MMOL/L (ref 135–145)
WBC # BLD AUTO: 5.7 K/UL (ref 4.8–10.8)

## 2019-04-01 PROCEDURE — 85610 PROTHROMBIN TIME: CPT

## 2019-04-01 PROCEDURE — 80053 COMPREHEN METABOLIC PANEL: CPT

## 2019-04-01 PROCEDURE — 99285 EMERGENCY DEPT VISIT HI MDM: CPT

## 2019-04-01 PROCEDURE — 96366 THER/PROPH/DIAG IV INF ADDON: CPT

## 2019-04-01 PROCEDURE — 85025 COMPLETE CBC W/AUTO DIFF WBC: CPT

## 2019-04-01 PROCEDURE — 85730 THROMBOPLASTIN TIME PARTIAL: CPT

## 2019-04-01 PROCEDURE — 83690 ASSAY OF LIPASE: CPT

## 2019-04-01 PROCEDURE — 71045 X-RAY EXAM CHEST 1 VIEW: CPT

## 2019-04-01 PROCEDURE — 80307 DRUG TEST PRSMV CHEM ANLYZR: CPT

## 2019-04-01 PROCEDURE — 700101 HCHG RX REV CODE 250: Performed by: EMERGENCY MEDICINE

## 2019-04-01 PROCEDURE — 96365 THER/PROPH/DIAG IV INF INIT: CPT

## 2019-04-01 RX ADMIN — THIAMINE HYDROCHLORIDE: 100 INJECTION, SOLUTION INTRAMUSCULAR; INTRAVENOUS at 13:05

## 2019-04-01 ASSESSMENT — LIFESTYLE VARIABLES: DO YOU DRINK ALCOHOL: NO

## 2019-04-01 NOTE — ED PROVIDER NOTES
"ED Provider Note    CHIEF COMPLAINT  Chief Complaint   Patient presents with   • Legal 2000     suicidal ideation   • Drug Overdose     clonazepam and alcohol       HPI  Ashleigh Marquis is a 58 y.o. female who presents emerged from today for alcohol intoxication and apparently took 10-12 clonazepam.  However when reviewed patient's pill count was normal.  Patient denies any suicidal homicidal ideation has stated this throughout her stay here in the emergency room she has history of alcohol abuse.  She denies chest pain or shortness of breath no fever chills or changes to bowel or bladder.  Please refer to Dr. Ayala's note.  Waiting alcohol level to come down to less than legal limit psychiatric consultation.    REVIEW OF SYSTEMS  See HPI for further details. All other systems are negative.     PAST MEDICAL HISTORY  Past Medical History:   Diagnosis Date   • Cancer (HCC) 1981    cervical   • Alcoholism (HCC)    • Anxiety    • Arthritis    • ASTHMA    • Chronic low back pain    • Congestive heart failure (HCC)    • Depression    • EtOH dependence (HCC)    • Fall     alcohol related   • GERD (gastroesophageal reflux disease)    • HTN    • Hypertension    • Indigestion     GERD   • Muscle disorder    • OSTEOPOROSIS    • Other specified symptom associated with female genital organs     \"I have fibroids bad\"\"   • Psychiatric disorder    • PTSD (post-traumatic stress disorder)    • Renal disorder     Hx of stones   • Seizure (HCC)     several years ago r/t alcohol withdrawl   • Ulcer    • Vitamin D deficiency        FAMILY HISTORY  Family History   Problem Relation Age of Onset   • Heart Disease Father    • Hypertension Father    • Diabetes Father    • Cancer Paternal Grandmother    • Depression Other    • Lung Disease Neg Hx    • Stroke Neg Hx        SOCIAL HISTORY  Social History     Social History   • Marital status:      Spouse name: N/A   • Number of children: N/A   • Years of education: N/A     Social " History Main Topics   • Smoking status: Current Every Day Smoker     Packs/day: 0.50     Years: 20.00     Types: Cigarettes   • Smokeless tobacco: Never Used      Comment: 1/2 pack per day   • Alcohol use Yes      Comment: vodka   • Drug use: No   • Sexual activity: No     Other Topics Concern   • Not on file     Social History Narrative    ** Merged History Encounter **            SURGICAL HISTORY  Past Surgical History:   Procedure Laterality Date   • GASTROSCOPY-ENDO N/A 10/3/2018    Procedure: GASTROSCOPY-ENDO;  Surgeon: Ben Negro M.D.;  Location: ENDOSCOPY [facility];  Service: Gastroenterology   • CYSTOSCOPY STENT PLACEMENT  11/9/2010    Performed by ANGEL HARRIS at SURGERY [facility]   • URETEROSCOPY  11/9/2010    Performed by ANGEL HARRIS at SURGERY [facility]   • LASERTRIPSY  11/9/2010    Performed by ANGEL HARRIS at [facility]   • STENT REMOVAL  11/9/2010    Performed by ANGEL HARRIS at SURGERY [facility]   • CYSTO STENT PLACEMNT PRE SURG  10/7/2010    Performed by ANGEL HARRIS at SURGERY [facility]   • HERNIA REPAIR  1977       CURRENT MEDICATIONS  Home Medications     Reviewed by Aretha Leone (Pharmacy Tech) on 03/31/19 at 1156  Med List Status: Complete   Medication Last Dose Status   clonazePAM (KLONOPIN) 0.5 MG Tab 3/30/2019 Active   lisinopril (PRINIVIL) 10 MG Tab 3/30/2019 Active   LORazepam (ATIVAN) 2 MG tablet 3/29/2019 Active   pantoprazole (PROTONIX) 40 MG Tablet Delayed Response 3/29/2019 Active   spironolactone (ALDACTONE) 25 MG Tab 3/29/2019 Active                ALLERGIES  Allergies   Allergen Reactions   • Sulfa Drugs Vomiting   • Toradol Vomiting   • Gabapentin      Bowel incontinence     • Amoxicillin Rash     Reported by NAIDA on arrival to ED    Pt states she takes PCN 10/3       PHYSICAL EXAM  VITAL SIGNS: /65   Pulse 95   Temp 36.8 °C (98.2 °F) (Temporal)   Resp 16   Wt 92 kg (202 lb 13.2 oz)   LMP  11/02/2015   SpO2 95%   BMI 34.81 kg/m²  Room air O2: 96    Constitutional: Well developed, Well nourished, No acute distress, Non-toxic appearance.   HENT: Normocephalic, Atraumatic, Bilateral external ears normal, Oropharynx moist, No oral exudates, Nose normal.   Cardiovascular: Normal heart rate, Normal rhythm, No murmurs, No rubs, No gallops.   Thorax & Lungs: Normal breath sounds, No respiratory distress, No wheezing, No chest tenderness.  Abdomen: Bowel sounds normal, Soft, No tenderness, No masses, No pulsatile masses.    Skin: Warm, Dry, No erythema, No rash.   Extremities: Intact distal pulses, No edema, No tenderness, No cyanosis, No clubbing.   Neurologic: No neurological deficits  Psychiatric: Patient denies suicidal homicidal ideation        COURSE & MEDICAL DECISION MAKING  Pertinent Labs & Imaging studies reviewed. (See chart for details)  Patient's alcohol level is come down to below legal limit seen by [facility] we both feel the patient is not suicidal or danger to herself at this point she is given referrals to counseling treatment programs follow-up with her primary care physician as none [facility]  Discussed alcohol cessation she verbalized understand instructions need for follow-up discharged in stable condition as above to home.    FINAL IMPRESSION  1.  Acute alcohol intoxication  2.  Situational depression  3.      Electronically signed by: Ervin Youngblood, 3/31/2019 7:09 PM

## 2019-04-01 NOTE — ED PROVIDER NOTES
"ED Provider Note    CHIEF COMPLAINT  Chief Complaint   Patient presents with   • Suicidal Ideation       HPI  Ashleigh Marquis is a 58 y.o. female who presents for evaluation of suicidal ideation altered mental status.  The patient has past history of alcohol abuse and was seen here yesterday for acute alcohol intoxication.  Patient was brought in by EMS.  The patient allegedly stated she she was going to jump in front of a train to kill her self.  Upon arrival here, the patient has slurred speech and is unable to provide any beneficial history to me at this time.  Subsequently as the patient became more sober she denies: Fever, URI symptoms, cardiorespiratory symptoms, gastrointestinal symptoms.  No other complaints.    REVIEW OF SYSTEMS  See HPI for further details.  Unobtainable from the patient due to her altered mental status;    PAST MEDICAL HISTORY  Past Medical History:   Diagnosis Date   • Alcoholism (HCC)    • Anxiety    • Arthritis    • ASTHMA    • Cancer (HCC) 1981    cervical   • Chronic low back pain    • Congestive heart failure (HCC)    • Depression    • EtOH dependence (HCC)    • Fall     alcohol related   • GERD (gastroesophageal reflux disease)    • HTN    • Hypertension    • Indigestion     GERD   • Muscle disorder    • OSTEOPOROSIS    • Other specified symptom associated with female genital organs     \"I have fibroids bad\"\"   • Psychiatric disorder    • PTSD (post-traumatic stress disorder)    • Renal disorder     Hx of stones   • Seizure (HCC)     several years ago r/t alcohol withdrawl   • Ulcer    • Vitamin D deficiency        FAMILY HISTORY  Family History   Problem Relation Age of Onset   • Heart Disease Father    • Hypertension Father    • Diabetes Father    • Cancer Paternal Grandmother    • Depression Other    • Lung Disease Neg Hx    • Stroke Neg Hx        SOCIAL HISTORY  Positive tobacco use; positive alcohol abuse; positive drug use;    SURGICAL HISTORY  Past Surgical History: " "  Procedure Laterality Date   • GASTROSCOPY-ENDO N/A 10/3/2018    Procedure: GASTROSCOPY-ENDO;  Surgeon: Ben Negro M.D.;  Location: [facility];  Service: Gastroenterology   • CYSTOSCOPY STENT PLACEMENT  11/9/2010    Performed by ANGEL HARRIS at SURGERY [facility]   • URETEROSCOPY  11/9/2010    Performed by ANGEL HARRIS at SURGERY [facility]   • LASERTRIPSY  11/9/2010    Performed by ANGEL HARRIS at [facility]   • STENT REMOVAL  11/9/2010    Performed by ANGEL HARRIS at [facility]   • CYSTO STENT PLACEMNT PRE SURG  10/7/2010    Performed by ANGEL HARRIS at [facility]   • HERNIA REPAIR  1977       CURRENT MEDICATIONS  Home Medications     Reviewed by Sade Sharp R.N. (Registered Nurse) on 04/01/19 at 1140  Med List Status: <None>   Medication Last Dose Status   clonazePAM (KLONOPIN) 0.5 MG Tab  Active   lisinopril (PRINIVIL) 10 MG Tab  Active   LORazepam (ATIVAN) 2 MG tablet  Active   pantoprazole (PROTONIX) 40 MG Tablet Delayed Response  Active   spironolactone (ALDACTONE) 25 MG Tab  Active                ALLERGIES  Allergies   Allergen Reactions   • Sulfa Drugs Vomiting   • Toradol Vomiting   • Gabapentin      Bowel incontinence     • Amoxicillin Rash     Reported by NAIDA on arrival to ED    Pt states she takes PCN 10/3       PHYSICAL EXAM  VITAL SIGNS: BP (!) 99/69   Pulse 81   Temp 36.7 °C (98 °F) (Temporal)   Resp 16   Ht 1.626 m (5' 4\")   Wt 91.6 kg (202 lb)   LMP 11/02/2015   SpO2 93%   BMI 34.67 kg/m²    Constitutional: 58-year-old female, appears much older than her stated age, obese, slurred speech, oriented x1  HENT: Normocephalic, Atraumatic, Nares:Clear, Oropharynx: Mildly dry, posterior pharynx:clear   Eyes: PERRL, EOMI, Conjunctiva normal, No discharge.   Neck: Normal range of motion, No tenderness, Supple, No stridor.   Lymphatic: No lymphadenopathy noted.   Cardiovascular: Regular rate and rhythm " without mumurs, gallups, rubs   Thorax & Lungs: Mild bilateral rhonchi, No respiratory distress, No wheezing, no stridor, no rales. No chest tenderness.   Abdomen: Soft, nontender, nondistended, no organomegaly, positive bowel sounds normal in quality. No guarding or rebound.  Skin: Good skin turgor, pink, warm, dry. No rashes, petechiae, purpura. Normal capillary refill.   Back: No tenderness, No CVA tenderness.   Extremities: Intact distal pulses, No edema, No tenderness, No cyanosis,  Vascular: Pulses are 2+, symmetric in the upper and lower extremities.  Musculoskeletal: Good range of motion in all major joints. No tenderness to palpation or major deformities noted.   Neurologic: Lethargic with slurred speech, oriented oriented x 1,  No gross focal deficits noted.   Psychiatric: Patient made threats of suicidal ideation but was under the influence of alcohol at the time;      COURSE & MEDICAL DECISION MAKING  Pertinent Labs & Imaging studies reviewed. (See chart for details)  1.  IV detox; IV fluids administered for clinical dehydration as well as hypotension; unable to attempt oral challenge as patient is high risk for aspiration secondary to her altered mental status; reevaluation revealed stable status;    Laboratory studies: CBC shows white count 5.7, 41% neutrophils, 49% lymphocytes, 6% monocytes, heme globin 13.1 hematocrit 41.0; coags within normal; urine drug screen is positive for benzodiazepines and cocaine; lipase 55; diagnostic alcohol 0.37;    Discussion: At this time, the patient presents for evaluation of altered mental status and depression.  The patient is acutely intoxicated with a blood alcohol of 0.37.  The patient was making suicidal threats and was placed under legal 2000 by police personnel.  The patient's been treated as noted above.  The patient's been observed.  Patient's mental status is improving.  At this time, we are waiting for the patient's alcohol to metabolize to the point that  "she is no longer under the influence.  Patient pulled out her IV.  She was up and ambulatory without assistance.  The patient's awake alert and oriented x3 patient states she has absolutely no thoughts of harming herself.  She states \"it was just the alcohol like always\".  Of note, the patient has been seen multiple times in the past for acute alcohol intoxication and many times she is no longer suicidal when she is sober.  At this time, patient does not appear motivated to stop drinking.  I see no indication for emergent hospitalization.  The patient no longer meets criteria for an involuntary committal under legal 2000.  Patient was given appropriate follow-up resources.    FINAL IMPRESSION  1. Alcoholic intoxication without complication (HCC)    2. Depression, unspecified depression type    3. Polysubstance abuse (HCC)           PLAN  1.  Appropriate discharge instructions given  2.  Follow-up with primary care  3.  Return anytime should she feel like she has any thoughts of harming herself which she has contracted that she will do.    Electronically signed by: Guy G Gansert, 4/1/2019 11:42 AM      "

## 2019-04-01 NOTE — ED NOTES
"Breathalyzer 0.059. Life skills notified. MD Youngblood notified.     Pt asking something for \"shakes.\" pt appears tremulous when arms are extended out.   "
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
## 2019-04-01 NOTE — ED PROVIDER NOTES
ED Provider Note    NOTE ENTERED IN ERROR, I DID NOT EVALUATE THIS PATIENT      ROS     Physical Exam      Irena Flores MD

## 2019-04-01 NOTE — ED NOTES
"Patient pulled out IV. ERP notified. Patient walking around room with steady gait, denies SI and states. \"I don't want to hurt anyone or myself, I just feel like an idiot\".   "

## 2019-04-01 NOTE — ED NOTES
Med rec updated and complete.  Allergies reviewed.  Pt denies oral antibiotic use in last 30 days.

## 2019-04-01 NOTE — DISCHARGE PLANNING
Alert Team  AT aware of pt presence in ER.  Pt currently intoxicated.  Bedside RN to call AT once pt's alcohol level below the legal limit.

## 2019-04-01 NOTE — ED NOTES
Patient resting in gurney with eyes closed. Chest rising and falling, with pulse ox in place. Will continue to monitor.

## 2019-04-01 NOTE — ED NOTES
Patients personal belongings removed and placed in personal belongings bag.  2 # bags labeled, facesheet in bag, and placed in ambulance bay.  Pt educated on SI/legal hold process.  Equipment removed from room.  UA sent to lab

## 2019-04-02 NOTE — ED NOTES
Discussed discharge instructions with patient, patient verbalized understanding. Patient has steady gait walking out to lobby for DC home.

## 2019-04-04 ENCOUNTER — HOSPITAL ENCOUNTER (EMERGENCY)
Dept: HOSPITAL 8 - ED | Age: 59
LOS: 1 days | Discharge: HOME | End: 2019-04-05
Payer: MEDICAID

## 2019-04-04 ENCOUNTER — HOSPITAL ENCOUNTER (EMERGENCY)
Dept: HOSPITAL 8 - ED | Age: 59
Discharge: LEFT BEFORE DISCHARGE | End: 2019-04-04
Payer: MEDICAID

## 2019-04-04 ENCOUNTER — HOSPITAL ENCOUNTER (EMERGENCY)
Dept: HOSPITAL 8 - ED | Age: 59
Discharge: HOME | End: 2019-04-04
Payer: MEDICAID

## 2019-04-04 VITALS — HEIGHT: 65 IN | BODY MASS INDEX: 29.38 KG/M2 | WEIGHT: 176.37 LBS

## 2019-04-04 VITALS — SYSTOLIC BLOOD PRESSURE: 128 MMHG | DIASTOLIC BLOOD PRESSURE: 85 MMHG

## 2019-04-04 VITALS — BODY MASS INDEX: 37.37 KG/M2 | HEIGHT: 67 IN | WEIGHT: 238.1 LBS

## 2019-04-04 DIAGNOSIS — F10.129: Primary | ICD-10-CM

## 2019-04-04 DIAGNOSIS — K21.9: ICD-10-CM

## 2019-04-04 DIAGNOSIS — F17.200: ICD-10-CM

## 2019-04-04 DIAGNOSIS — E11.9: ICD-10-CM

## 2019-04-04 DIAGNOSIS — I10: ICD-10-CM

## 2019-04-04 DIAGNOSIS — Z53.21: Primary | ICD-10-CM

## 2019-04-04 DIAGNOSIS — Z88.0: ICD-10-CM

## 2019-04-04 DIAGNOSIS — Z88.2: ICD-10-CM

## 2019-04-04 PROCEDURE — 99283 EMERGENCY DEPT VISIT LOW MDM: CPT

## 2019-04-05 ENCOUNTER — HOSPITAL ENCOUNTER (EMERGENCY)
Facility: MEDICAL CENTER | Age: 59
End: 2019-04-06
Attending: EMERGENCY MEDICINE
Payer: MEDICAID

## 2019-04-05 ENCOUNTER — HOSPITAL ENCOUNTER (EMERGENCY)
Facility: MEDICAL CENTER | Age: 59
End: 2019-04-05
Attending: EMERGENCY MEDICINE
Payer: MEDICAID

## 2019-04-05 VITALS
BODY MASS INDEX: 32.78 KG/M2 | HEART RATE: 90 BPM | TEMPERATURE: 97.8 F | DIASTOLIC BLOOD PRESSURE: 75 MMHG | OXYGEN SATURATION: 94 % | HEIGHT: 64 IN | WEIGHT: 192.02 LBS | RESPIRATION RATE: 16 BRPM | SYSTOLIC BLOOD PRESSURE: 123 MMHG

## 2019-04-05 VITALS
RESPIRATION RATE: 14 BRPM | OXYGEN SATURATION: 95 % | WEIGHT: 191.8 LBS | SYSTOLIC BLOOD PRESSURE: 110 MMHG | HEIGHT: 64 IN | TEMPERATURE: 98.4 F | BODY MASS INDEX: 32.74 KG/M2 | DIASTOLIC BLOOD PRESSURE: 66 MMHG | HEART RATE: 97 BPM

## 2019-04-05 VITALS — SYSTOLIC BLOOD PRESSURE: 129 MMHG | DIASTOLIC BLOOD PRESSURE: 81 MMHG

## 2019-04-05 DIAGNOSIS — H10.9 CONJUNCTIVITIS OF RIGHT EYE, UNSPECIFIED CONJUNCTIVITIS TYPE: ICD-10-CM

## 2019-04-05 DIAGNOSIS — F10.920 ALCOHOLIC INTOXICATION WITHOUT COMPLICATION (HCC): ICD-10-CM

## 2019-04-05 LAB — GLUCOSE BLD-MCNC: 79 MG/DL (ref 65–99)

## 2019-04-05 PROCEDURE — 99284 EMERGENCY DEPT VISIT MOD MDM: CPT | Mod: 27

## 2019-04-05 PROCEDURE — 82962 GLUCOSE BLOOD TEST: CPT

## 2019-04-05 PROCEDURE — 700101 HCHG RX REV CODE 250: Performed by: EMERGENCY MEDICINE

## 2019-04-05 PROCEDURE — 99284 EMERGENCY DEPT VISIT MOD MDM: CPT

## 2019-04-05 RX ORDER — PROPARACAINE HYDROCHLORIDE 5 MG/ML
1 SOLUTION/ DROPS OPHTHALMIC ONCE
Status: COMPLETED | OUTPATIENT
Start: 2019-04-05 | End: 2019-04-05

## 2019-04-05 RX ORDER — ERYTHROMYCIN 5 MG/G
OINTMENT OPHTHALMIC ONCE
Status: DISCONTINUED | OUTPATIENT
Start: 2019-04-05 | End: 2019-04-05 | Stop reason: HOSPADM

## 2019-04-05 RX ADMIN — FLUORESCEIN SODIUM 1.2 MG: 0.6 STRIP OPHTHALMIC at 17:00

## 2019-04-05 RX ADMIN — PROPARACAINE HYDROCHLORIDE 1 DROP: 5 SOLUTION/ DROPS OPHTHALMIC at 17:00

## 2019-04-05 NOTE — ED PROVIDER NOTES
"ED Provider Note    Scribed for Evaristo Wheeler D.O. by Bon Donovan. 4/5/2019  4:09 PM    Primary care provider: Pcp Pt States None  Means of arrival: Ambulance  History obtained from: Patient  History limited by: ETOH intoxication    CHIEF COMPLAINT  Chief Complaint   Patient presents with   • Alcohol Intoxication     pt reports drinking vodka. Hx of ETOH abuse. Awake, alert. VSS on RA       HPI  Ashleigh Marquis is a 58 y.o. female who presents to the Emergency Department, via ambulance, for evaluation of acute alcohol intoxication. Per EMS, the patient reportedly drank \"a liter of two of vodka today\". Her vitals were stable in route. The patient affirms drinking today but denies any drug use. The patient denies any falls of head trauma. She affirms eye pain at this time. The patient denies any rashes, head pain, or abdominal pain. The patient is A&O to her name and birthday but not to her location. The patient has a history of ETOH abuse.     Further history is limited secondary to ETOH intoxication.    REVIEW OF SYSTEMS  Pertinent positives include ETOH intoxication and eye pain. Pertinent negatives include no drug use.      ROS is limited secondary to acute ETOH intoxication    PAST MEDICAL HISTORY  Past Medical History:   Diagnosis Date   • Alcoholism (HCC)    • Anxiety    • Arthritis    • ASTHMA    • Cancer (HCC) 1981    cervical   • Chronic low back pain    • Congestive heart failure (HCC)    • Depression    • EtOH dependence (HCC)    • Fall     alcohol related   • GERD (gastroesophageal reflux disease)    • HTN    • Hypertension    • Indigestion     GERD   • Muscle disorder    • OSTEOPOROSIS    • Other specified symptom associated with female genital organs     \"I have fibroids bad\"\"   • Psychiatric disorder    • PTSD (post-traumatic stress disorder)    • Renal disorder     Hx of stones   • Seizure (HCC)     several years ago r/t alcohol withdrawl   • Ulcer    • Vitamin D deficiency  "       SURGICAL HISTORY  Past Surgical History:   Procedure Laterality Date   • GASTROSCOPY-ENDO N/A 10/3/2018    Procedure: GASTROSCOPY-ENDO;  Surgeon: Ben Negro M.D.;  Location: ENDOSCOPY [facility];  Service: Gastroenterology   • CYSTOSCOPY STENT PLACEMENT  11/9/2010    Performed by ANGEL HARRIS at SURGERY [facility]   • URETEROSCOPY  11/9/2010    Performed by ANGEL HARRIS at SURGERY [facility]   • LASERTRIPSY  11/9/2010    Performed by ANGEL HARRIS at [facility]   • STENT REMOVAL  11/9/2010    Performed by ANGEL HARRIS at [facility]   • CYSTO STENT PLACEMNT PRE SURG  10/7/2010    Performed by ANGEL HARRSI at [facility]   • HERNIA REPAIR  1977        SOCIAL HISTORY  Social History   Substance Use Topics   • Smoking status: Current Every Day Smoker     Packs/day: 0.50     Years: 20.00     Types: Cigarettes   • Smokeless tobacco: Never Used      Comment: 1/2 pack per day   • Alcohol use Yes      Comment: vodka      History   Drug Use No       FAMILY HISTORY  Family History   Problem Relation Age of Onset   • Heart Disease Father    • Hypertension Father    • Diabetes Father    • Cancer Paternal Grandmother    • Depression Other    • Lung Disease Neg Hx    • Stroke Neg Hx        CURRENT MEDICATIONS  Home Medications     Reviewed by Geni Cuellar R.N. (Registered Nurse) on 04/05/19 at 1515  Med List Status: <None>   Medication Last Dose Status   clonazePAM (KLONOPIN) 0.5 MG Tab  Active   lisinopril (PRINIVIL) 10 MG Tab  Active   LORazepam (ATIVAN) 2 MG tablet  Active   pantoprazole (PROTONIX) 40 MG Tablet Delayed Response  Active   spironolactone (ALDACTONE) 25 MG Tab  Active                ALLERGIES  Allergies   Allergen Reactions   • Sulfa Drugs Vomiting   • Toradol Vomiting   • Gabapentin      Bowel incontinence     • Amoxicillin Rash     Reported by NAIDA on arrival to ED    Pt states she takes PCN 10/3       PHYSICAL EXAM  VITAL SIGNS: BP  "109/67   Pulse 94   Temp 36.3 °C (97.3 °F) (Temporal)   Resp 14   Ht 1.626 m (5' 4\")   Wt 87.1 kg (192 lb 0.3 oz)   LMP 11/02/2015   BMI 32.96 kg/m²     Nursing notes and vitals reviewed.  Constitutional: Well developed, Well nourished, No acute distress, Non-toxic appearance.   Eyes: PERRLA, EOMI, Conjunctiva normal, No discharge.   Cardiovascular: Normal heart rate, Normal rhythm, No murmurs, No rubs, No gallops.   Thorax & Lungs: No respiratory distress, No rales, No rhonchi, No wheezing, No chest tenderness.   Abdomen: Soft, No tenderness, No guarding, No rebound, No masses, No pulsatile masses.   Skin: Warm, Dry, No erythema, No rash.   Musculoskeletal: Intact distal pulses, No edema, No cyanosis, No clubbing. Good range of motion in all major joints.   Neurologic: Alert & oriented to name, age, and place. Normal motor function, Normal sensory function, No focal neuro deficits noted. Slurred speech. Appears intoxicated     DIAGNOSTIC STUDIES/PROCEDURES    LABS  Results for orders placed or performed during the hospital encounter of 04/05/19   ACCU-CHEK GLUCOSE   Result Value Ref Range    Glucose - Accu-Ck 79 65 - 99 mg/dL     All labs reviewed by me.      COURSE & MEDICAL DECISION MAKING  Pertinent Labs & Imaging studies reviewed. (See chart for details)    4:09 PM - Patient seen and examined at bedside. Ordered Accu-Glucose and visual acuity screening to evaluate her symptoms.       5:00 PM - I performed an eye exam on the patient using proparacaine and fluricine. No foreign body, no corneal abrasions. Slight conjunctival and scleral injection. Ordered erythromycin opthalmic ointment, I drop .5% Opthaine solution, and 1.2 mg Ful-Desi ophthalmic strip.     7:15 PM - I reevaluated the patient at bedside. The patient is no longer slurring words and is able to ambulate steadily. The patient is stable for discharge. I updated her on my plan for discharge and patient verbalizes understanding and agreement. "     This is a [facility] 58 y.o. female that presents with alcohol intoxication and junk to [employer].  Patient has a normal blood sugar per EMS.  The patient does have evidence of slight conjunctivitis no evidence of foreign body.  As for alcohol intoxication, she is been seen here multiple times for the same complaint.  Not suspect significant elect or abnormality, traumatic injury, intracranial hemorrhage, CVA or TIA.  She was allowed time to metabolize her alcohol on reevaluation she was alert and oriented, she was steady on her feet, she was clinically sober and is discharged with follow-up precautions and to follow-up with her primary care physician for reevaluation of conjunctivitis.  Patient received erythromycin ointment with instructions on how to use the medication.  The patient will return for new or worsening symptoms and is stable at the time of discharge.    The patient is referred to a primary physician for blood pressure management, diabetic screening, and for all other preventative health concerns.    DISPOSITION:  Patient will be discharged home in stable condition.    FOLLOW UP:  [facility], Emergency Dept  [address] [facility] [id number] [phone]    If symptoms worsen    [address] [address]  [phone]          OUTPATIENT MEDICATIONS:  Discharge Medication List as of 4/5/2019  6:14 PM          FINAL IMPRESSION  1. Alcoholic intoxication without complication (HCC) Active   2. Conjunctivitis of right eye, unspecified conjunctivitis type Active         Bon GAN (Cayetano), am scribing for, and in the presence of, Evaristo Wheeler D.O    Electronically signed by: Bon Dnoovan (Cayetano), 4/5/2019    Evaristo GAN D.O. personally performed the services described in this documentation, as scribed by Bon Donovan in my presence, and it is both accurate and complete.    The note accurately reflects work and  decisions made by me.  Evaristo Wheeler  4/5/2019  10:24 PM
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
## 2019-04-06 ENCOUNTER — HOSPITAL ENCOUNTER (EMERGENCY)
Facility: MEDICAL CENTER | Age: 59
End: 2019-04-06
Attending: EMERGENCY MEDICINE
Payer: MEDICAID

## 2019-04-06 ENCOUNTER — HOSPITAL ENCOUNTER (EMERGENCY)
Dept: HOSPITAL 8 - ED | Age: 59
LOS: 1 days | Discharge: HOME | End: 2019-04-07
Payer: MEDICAID

## 2019-04-06 VITALS
HEART RATE: 80 BPM | BODY MASS INDEX: 32.74 KG/M2 | SYSTOLIC BLOOD PRESSURE: 135 MMHG | DIASTOLIC BLOOD PRESSURE: 75 MMHG | WEIGHT: 191.8 LBS | TEMPERATURE: 96.6 F | RESPIRATION RATE: 18 BRPM | HEIGHT: 64 IN | OXYGEN SATURATION: 95 %

## 2019-04-06 VITALS — WEIGHT: 197.31 LBS | HEIGHT: 65 IN | BODY MASS INDEX: 32.87 KG/M2

## 2019-04-06 DIAGNOSIS — E11.9: ICD-10-CM

## 2019-04-06 DIAGNOSIS — I10: ICD-10-CM

## 2019-04-06 DIAGNOSIS — K21.9: ICD-10-CM

## 2019-04-06 DIAGNOSIS — Z59.00 HOMELESSNESS: ICD-10-CM

## 2019-04-06 DIAGNOSIS — F17.200: ICD-10-CM

## 2019-04-06 DIAGNOSIS — F10.220: Primary | ICD-10-CM

## 2019-04-06 DIAGNOSIS — F10.20 ALCOHOLISM (HCC): ICD-10-CM

## 2019-04-06 PROCEDURE — 99283 EMERGENCY DEPT VISIT LOW MDM: CPT

## 2019-04-06 PROCEDURE — 99284 EMERGENCY DEPT VISIT MOD MDM: CPT

## 2019-04-06 SDOH — ECONOMIC STABILITY - HOUSING INSECURITY: HOMELESSNESS UNSPECIFIED: Z59.00

## 2019-04-06 NOTE — ED NOTES
Received report from TETO Rodriguez, taking over pt care. Pt resting comfortably, pt maintaining patent airway, no distress, no needs at this time, bed in lowered position, side rails up

## 2019-04-06 NOTE — ED NOTES
Patient has been sleeping in [facility], no distress noted at this time.

## 2019-04-06 NOTE — ED PROVIDER NOTES
ED Provider Note    ER PROVIDER NOTE      CHIEF COMPLAINT  Chief Complaint   Patient presents with   • Alcohol Intoxication     pt was just dc a couple hours ago, reports drinking vodka after, BIB [facility] from shelter because patient was not responding to staff, c/o nausea       HPI  Ashleigh Marquis is a 58 y.o. female who presents to the emergency department intoxicated.  Patient was discharged from the emergency department just a few hours ago, she then left, went and drank, and try to check into the shelter where she was too intoxicated to stay at the shelter and they called EMS.  Patient denies any focal complaints, she denies any headache neck pain or falls.  Denies any chest pain shortness of breath.  Denies any fevers chills abdominal pain nausea or vomiting.  Denies any other ingestion other than alcohol    HPI is limited as patient is intoxicated at this time    REVIEW OF SYSTEMS  Pertinent positives include alcohol intoxication. Pertinent negatives include no headache. See HPI for details. All other systems reviewed and are negative.    PAST MEDICAL HISTORY   has a past medical history of Alcoholism ([facility]); Anxiety; Arthritis; ASTHMA; Cancer ([facility]) (1981); Chronic low back pain; Congestive heart failure ([facility]); Depression; EtOH dependence ([facility]); Fall; GERD (gastroesophageal reflux disease); HTN; Hypertension; Indigestion; Muscle disorder; OSTEOPOROSIS; Other specified symptom associated with female genital organs; Psychiatric disorder; PTSD (post-traumatic stress disorder); Renal disorder; Seizure ([facility]); Ulcer; and Vitamin D deficiency.    SURGICAL HISTORY   has a past surgical history that includes hernia repair (1977); cysto stent placemnt pre surg (10/7/2010); cystoscopy stent placement (11/9/2010); ureteroscopy (11/9/2010); lasertripsy (11/9/2010); stent removal (11/9/2010); and gastroscopy-endo (N/A, 10/3/2018).    FAMILY HISTORY  Family History   Problem Relation Age of Onset   • Heart Disease Father   "  • Hypertension Father    • Diabetes Father    • Cancer Paternal Grandmother    • Depression Other    • Lung Disease Neg Hx    • Stroke Neg Hx        SOCIAL HISTORY  Social History     Social History   • Marital status:      Spouse name: N/A   • Number of children: N/A   • Years of education: N/A     Social History Main Topics   • Smoking status: Current Every Day Smoker     Packs/day: 0.50     Years: 20.00     Types: Cigarettes   • Smokeless tobacco: Never Used      Comment: 1/2 pack per day   • Alcohol use Yes      Comment: vodka   • Drug use: No   • Sexual activity: No     Other Topics Concern   • Not on file     Social History Narrative    ** Merged History Encounter **           History   Drug Use No       CURRENT MEDICATIONS  Home Medications    **Home medications have not yet been reviewed for this encounter**         ALLERGIES  Allergies   Allergen Reactions   • Sulfa Drugs Vomiting   • Toradol Vomiting   • Gabapentin      Bowel incontinence     • Amoxicillin Rash     Reported by NAIDA on arrival to ED    Pt states she takes PCN 10/3       PHYSICAL EXAM  VITAL SIGNS: /66   Pulse 97   Temp 36.9 °C (98.4 °F) (Temporal)   Resp 14   Ht 1.626 m (5' 4\")   Wt 87 kg (191 lb 12.8 oz)   LMP 11/02/2015   SpO2 95%   BMI 32.92 kg/m²   Pulse ox interpretation: I interpret this pulse ox as normal.    Constitutional: Somnolent but easily arousable to verbal stimuli, slurred speech, unkempt, smells of alcohol  HENT: No signs of trauma, Bilateral external ears normal, Nose normal.  Bilateral horizontal nystagmus  Eyes: Pupils are equal and reactive, Conjunctiva normal, Non-icteric.   Neck: Normal range of motion, No tenderness, Supple, No stridor.   Lymphatic: No lymphadenopathy noted.   Cardiovascular: Regular rate and rhythm, no murmurs.   Thorax & Lungs: Normal breath sounds, No respiratory distress, No wheezing, No chest tenderness.   Abdomen: Bowel sounds normal, Soft, No tenderness, No masses, No " pulsatile masses. No peritoneal signs.  Skin: Warm, Dry, No erythema, No rash.   Back: No bony tenderness, No CVA tenderness.   Extremities: Intact distal pulses, No edema, No tenderness, No cyanosis.  Musculoskeletal: Good range of motion in all major joints. No tenderness to palpation or major deformities noted.   Neurologic: Somnolent but easily arousable, moves all 4 extremities to command, unable to ambulate  Psychiatric: Nonsuicidal    DIAGNOSTIC STUDIES / PROCEDURES      COURSE & MEDICAL DECISION MAKING  Nursing notes, VS, PMSFHx reviewed in chart.    10:36 PM Patient seen and examined at bedside.  Patient's current clinical presentation is suggestive of alcohol intoxication without evidence of trauma to suggest intracranial injury we will continue to monitor for sobriety    2:07 AM  Patient reevaluated, still easily arousable but still quite intoxicated, will continue to monitor      Decision Making:  This is a 58 y.o. female seen in ED for alcohol intoxication.  The patient was clinically intoxicated on hx and PE  There is no evidence of head trauma without any evidence of laceration, contusions, hematomas or fractures. Doubt significant intracranial bleed.  There is no evidence of SBI with stable vital signs and no pain or focal infx complaints.  The pt has a supple neck and no headache, therefore low suspicion for meningitis, encephalitis for the cause of this pts AMS.   Although serum chemistries were not drawn, the normal progression of sobriety and the pts clearing sensorium at this time make a significant electrolyte abnormality or metabolic disturbance or dangerous toxic ingestion as a cause for the pts AMS unlikely.       Patient has been sober and probably other still at this point clinically intoxicated, care will be signed over to Dr. Guillen pending full sobriety and likely discharge       The patient will return for new or worsening symptoms and is stable at the time of discharge.    The  patient is referred to a primary physician for blood pressure management, diabetic screening, and for all other preventative health concerns.      DISPOSITION:  Patient will be discharged home in stable condition.    FOLLOW UP:  [address] [address]  [phone]          OUTPATIENT MEDICATIONS:  New Prescriptions    No medications on file         FINAL IMPRESSION  1. Alcoholic intoxication without complication (HCC)          The note accurately reflects work and decisions made by me.  Matt Joyce  4/6/2019  3:02 AM

## 2019-04-06 NOTE — ED NOTES
Pt received discharge instructions and understood all including follow up, pt ambulated to [facility] with no difficulty, pt has steady gait  And needs no assistance walking

## 2019-04-06 NOTE — ED NOTES
"Patient yelling \"HELP ME!\" in [place], pt was asked to refrain from yelling in the [place] and asked what she needed, patient said she needs detox, patient was told that her plan of care is to be dc after she gets some rest. Additional blanket give. Patient continues to yell loudly in [place].   "

## 2019-04-06 NOTE — ED NOTES
called, Pt has MTM, directed to lobby to arrange for transport. Pt discharged, ambulatory and steady on feet.  Pt given abx ointment.

## 2019-04-06 NOTE — ED PROVIDER NOTES
"ED Provider Note    CHIEF COMPLAINT  Chief Complaint   Patient presents with   • Alcohol Intoxication     patient back for the 3rd time in last 24 hrs, found laying on ground at bus stop, no trauma       HPI  Ashleigh Marquis is a 58 y.o. female who presents to the emergency department after being found sleeping at the bus stop.  She has a history of alcoholism.  She was in the hospital last night and just discharged early this morning.  She walked to the bus stop laid down on the ground and fell asleep.  The paramedics were called because she went to get up.  The patient was transferred here for further evaluation.  She denies any trauma.  No fever.  No headache.  She says she is cold and wants a blanket.    REVIEW OF SYSTEMS  As per HPI, otherwise a 10 point review of systems is negative    PAST MEDICAL HISTORY  Past Medical History:   Diagnosis Date   • Alcoholism (HCC)    • Anxiety    • Arthritis    • ASTHMA    • Cancer (HCC) 1981    cervical   • Chronic low back pain    • Congestive heart failure (HCC)    • Depression    • EtOH dependence (HCC)    • Fall     alcohol related   • GERD (gastroesophageal reflux disease)    • HTN    • Hypertension    • Indigestion     GERD   • Muscle disorder    • OSTEOPOROSIS    • Other specified symptom associated with female genital organs     \"I have fibroids bad\"\"   • Psychiatric disorder    • PTSD (post-traumatic stress disorder)    • Renal disorder     Hx of stones   • Seizure (HCC)     several years ago r/t alcohol withdrawl   • Ulcer    • Vitamin D deficiency        SOCIAL HISTORY  Social History   Substance Use Topics   • Smoking status: Current Every Day Smoker     Packs/day: 0.50     Years: 20.00     Types: Cigarettes   • Smokeless tobacco: Never Used      Comment: 1/2 pack per day   • Alcohol use Yes      Comment: [email or web address]       SURGICAL HISTORY  Past Surgical History:   Procedure Laterality Date   • GASTROSCOPY-ENDO N/A 10/3/2018    Procedure: GASTROSCOPY-ENDO;  " "Surgeon: Ben Negro M.D.;  Location: [facility];  Service: Gastroenterology   • CYSTOSCOPY STENT PLACEMENT  11/9/2010    Performed by ANGEL HARRIS at SURGERY [facility]   • URETEROSCOPY  11/9/2010    Performed by ANGEL HARRIS at SURGERY [facility]   • LASERTRIPSY  11/9/2010    Performed by ANGEL HARRIS at SURGERY [facility]   • STENT REMOVAL  11/9/2010    Performed by ANGEL HARRIS at SURGERY [facility]   • CYSTO STENT PLACEMNT PRE SURG  10/7/2010    Performed by ANGEL HARRIS at [facility]   • HERNIA REPAIR  1977       CURRENT MEDICATIONS  Home Medications    **Home medications have not yet been reviewed for this encounter**         ALLERGIES  Allergies   Allergen Reactions   • Sulfa Drugs Vomiting   • Toradol Vomiting   • Gabapentin      Bowel incontinence     • Amoxicillin Rash     Reported by NAIDA on arrival to ED    Pt states she takes PCN 10/3       PHYSICAL EXAM  VITAL SIGNS: /75   Pulse 80   Temp 35.9 °C (96.6 °F) (Temporal)   Resp 18   Ht 1.626 m (5' 4\")   Wt 87 kg (191 lb 12.8 oz)   LMP 11/02/2015   SpO2 95%   BMI 32.92 kg/m²    Constitutional: She is asleep and yells profanities at me when I try to wake her up.  Disheveled  HENT:  Atraumatic, Normocephalic.Oropharynx moist mucus membranes, Nose normal inspection.   Eyes: Normal inspection  Neck: Supple  Cardiovascular: Normal heart rate, Normal rhythm.  Symmetric peripheral pulses.   Thorax & Lungs: No respiratory distress, No wheezing, No rales, No rhonchi, No chest tenderness.   Abdomen: Bowel sounds normal, soft, non-distended, nontender, no mass  Skin: Warm, Dry, No rash.   Back: No tenderness, No CVA tenderness.   Extremities: Mild lower extremity edema  Neurologic: She moves all extremities symmetrically.  She has some slurred speech consistent with alcohol   Abuse    COURSE & MEDICAL DECISION MAKING  Patient presents apparently intoxicated versus just needing a place to " go.  She describes she is going to the bus stop and falling asleep.  She may have had some additional alcohol.  Regardless she does not have any medical complaints or findings on examination.  Her vital signs are stable.  She was allowed to sleep in the ER for a few hours.  She is up and ambulatory.  She is steady on her feet.  She has no complaints.  She has no interest in detox or any social assistance.  She will be discharged to return to the ER for concerning medical symptoms.    Patient referred to primary provider for blood pressure management    FINAL IMPRESSION  1.  Alcohol abuse and addiction  2.  Homelessness      This dictation was created using voice recognition software. The accuracy of the dictation is limited to the abilities of the software.  The nursing notes were reviewed and certain aspects of this information were incorporated into this note.      Electronically signed by: Romero Light, 4/6/2019 11:44 AM

## 2019-04-06 NOTE — ED NOTES
Chief Complaint   Patient presents with   • Alcohol Intoxication     patient back for the 3rd time in last 24 hrs, found laying on ground at bus stop, no trauma     Patient BIB EMS, moved herself from [facility] to [facility] and went back to sleep.

## 2019-04-06 NOTE — ED NOTES
"Patient again yelling \"I need another fucking blanket!!!\" in hallway. Patient was asked not to yell and use profanity.   "

## 2019-04-07 ENCOUNTER — HOSPITAL ENCOUNTER (EMERGENCY)
Facility: MEDICAL CENTER | Age: 59
End: 2019-04-07
Payer: MEDICAID

## 2019-04-07 VITALS — SYSTOLIC BLOOD PRESSURE: 124 MMHG | DIASTOLIC BLOOD PRESSURE: 68 MMHG

## 2019-04-07 VITALS
HEART RATE: 91 BPM | RESPIRATION RATE: 14 BRPM | TEMPERATURE: 97.5 F | OXYGEN SATURATION: 94 % | DIASTOLIC BLOOD PRESSURE: 109 MMHG | SYSTOLIC BLOOD PRESSURE: 144 MMHG

## 2019-04-07 PROCEDURE — 302449 STATCHG TRIAGE ONLY (STATISTIC)

## 2019-04-07 ASSESSMENT — LIFESTYLE VARIABLES
TOTAL SCORE: 4
DOES PATIENT WANT TO STOP DRINKING: NO
HAVE YOU EVER FELT YOU SHOULD CUT DOWN ON YOUR DRINKING: YES
EVER FELT BAD OR GUILTY ABOUT YOUR DRINKING: YES
EVER HAD A DRINK FIRST THING IN THE MORNING TO STEADY YOUR NERVES TO GET RID OF A HANGOVER: YES
HAVE PEOPLE ANNOYED YOU BY CRITICIZING YOUR DRINKING: YES
DO YOU DRINK ALCOHOL: YES
TOTAL SCORE: 4
TOTAL SCORE: 4
CONSUMPTION TOTAL: INCOMPLETE

## 2019-04-07 NOTE — ED NOTES
[place].  Is now stating she is leaving before being seen by MD

## 2019-04-08 ENCOUNTER — HOSPITAL ENCOUNTER (EMERGENCY)
Dept: HOSPITAL 8 - ED | Age: 59
Discharge: HOME | End: 2019-04-08
Payer: MEDICAID

## 2019-04-08 VITALS — SYSTOLIC BLOOD PRESSURE: 147 MMHG | DIASTOLIC BLOOD PRESSURE: 84 MMHG

## 2019-04-08 VITALS — DIASTOLIC BLOOD PRESSURE: 89 MMHG | SYSTOLIC BLOOD PRESSURE: 132 MMHG

## 2019-04-08 VITALS — BODY MASS INDEX: 24.22 KG/M2 | HEIGHT: 63 IN | WEIGHT: 136.69 LBS

## 2019-04-08 VITALS — WEIGHT: 154.32 LBS | HEIGHT: 63 IN | BODY MASS INDEX: 27.34 KG/M2

## 2019-04-08 DIAGNOSIS — I10: ICD-10-CM

## 2019-04-08 DIAGNOSIS — Z72.9: ICD-10-CM

## 2019-04-08 DIAGNOSIS — F32.9: ICD-10-CM
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
## 2019-04-08 DIAGNOSIS — F10.20: Primary | ICD-10-CM
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
## 2019-04-08 DIAGNOSIS — K21.9: ICD-10-CM

## 2019-04-08 DIAGNOSIS — S09.8XXA: Primary | ICD-10-CM
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
## 2019-04-08 DIAGNOSIS — E11.9: ICD-10-CM
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
## 2019-04-08 DIAGNOSIS — F10.221: ICD-10-CM

## 2019-04-08 DIAGNOSIS — Z91.14: ICD-10-CM

## 2019-04-08 PROCEDURE — 99284 EMERGENCY DEPT VISIT MOD MDM: CPT

## 2019-04-08 PROCEDURE — 70450 CT HEAD/BRAIN W/O DYE: CPT

## 2019-04-08 PROCEDURE — 99283 EMERGENCY DEPT VISIT LOW MDM: CPT

## 2019-04-09 ENCOUNTER — HOSPITAL ENCOUNTER (EMERGENCY)
Dept: HOSPITAL 8 - ED | Age: 59
Discharge: HOME | End: 2019-04-09
Payer: MEDICAID

## 2019-04-09 VITALS
HEIGHT: 64 IN | BODY MASS INDEX: 30.86 KG/M2 | DIASTOLIC BLOOD PRESSURE: 81 MMHG | WEIGHT: 180.78 LBS | SYSTOLIC BLOOD PRESSURE: 143 MMHG

## 2019-04-09 DIAGNOSIS — I10: ICD-10-CM

## 2019-04-09 DIAGNOSIS — Z72.9: ICD-10-CM

## 2019-04-09 DIAGNOSIS — E11.9: ICD-10-CM

## 2019-04-09 DIAGNOSIS — K21.9: ICD-10-CM

## 2019-04-09 DIAGNOSIS — F32.9: ICD-10-CM

## 2019-04-09 DIAGNOSIS — F10.220: Primary | ICD-10-CM

## 2019-04-09 LAB
ALBUMIN SERPL-MCNC: 3.2 G/DL (ref 3.4–5)
ALP SERPL-CCNC: 201 U/L (ref 45–117)
ALT SERPL-CCNC: 35 U/L (ref 12–78)
ANION GAP SERPL CALC-SCNC: 11 MMOL/L (ref 5–15)
BASOPHILS # BLD AUTO: 0.06 X10^3/UL (ref 0–0.1)
BASOPHILS NFR BLD AUTO: 1 % (ref 0–1)
BILIRUB SERPL-MCNC: 0.3 MG/DL (ref 0.2–1)
CALCIUM SERPL-MCNC: 8.5 MG/DL (ref 8.5–10.1)
CHLORIDE SERPL-SCNC: 109 MMOL/L (ref 98–107)
CREAT SERPL-MCNC: 0.57 MG/DL (ref 0.55–1.02)
CULTURE INDICATED?: NO
EOSINOPHIL # BLD AUTO: 0.06 X10^3/UL (ref 0–0.4)
EOSINOPHIL NFR BLD AUTO: 1 % (ref 1–7)
ERYTHROCYTE [DISTWIDTH] IN BLOOD BY AUTOMATED COUNT: 17.1 % (ref 9.6–15.2)
LYMPHOCYTES # BLD AUTO: 1.79 X10^3/UL (ref 1–3.4)
LYMPHOCYTES NFR BLD AUTO: 31 % (ref 22–44)
MCH RBC QN AUTO: 30.5 PG (ref 27–34.8)
MCHC RBC AUTO-ENTMCNC: 32.6 G/DL (ref 32.4–35.8)
MCV RBC AUTO: 93.5 FL (ref 80–100)
MD: NO
MICROSCOPIC: (no result)
MONOCYTES # BLD AUTO: 0.39 X10^3/UL (ref 0.2–0.8)
MONOCYTES NFR BLD AUTO: 7 % (ref 2–9)
NEUTROPHILS # BLD AUTO: 3.43 X10^3/UL (ref 1.8–6.8)
NEUTROPHILS NFR BLD AUTO: 60 % (ref 42–75)
PLATELET # BLD AUTO: 298 X10^3/UL (ref 130–400)
PMV BLD AUTO: 7.8 FL (ref 7.4–10.4)
PROT SERPL-MCNC: 6.7 G/DL (ref 6.4–8.2)
RBC # BLD AUTO: 4.09 X10^6/UL (ref 3.82–5.3)

## 2019-04-09 PROCEDURE — 74021 RADEX ABDOMEN 3+ VIEWS: CPT

## 2019-04-09 PROCEDURE — 83690 ASSAY OF LIPASE: CPT

## 2019-04-09 PROCEDURE — 99284 EMERGENCY DEPT VISIT MOD MDM: CPT

## 2019-04-09 PROCEDURE — 80053 COMPREHEN METABOLIC PANEL: CPT

## 2019-04-09 PROCEDURE — 36415 COLL VENOUS BLD VENIPUNCTURE: CPT

## 2019-04-09 PROCEDURE — 85025 COMPLETE CBC W/AUTO DIFF WBC: CPT

## 2019-04-09 PROCEDURE — 81003 URINALYSIS AUTO W/O SCOPE: CPT

## 2019-04-10 ENCOUNTER — HOSPITAL ENCOUNTER (EMERGENCY)
Dept: HOSPITAL 8 - ED | Age: 59
Discharge: HOME | End: 2019-04-10
Payer: MEDICAID

## 2019-04-10 ENCOUNTER — APPOINTMENT (OUTPATIENT)
Dept: RADIOLOGY | Facility: MEDICAL CENTER | Age: 59
End: 2019-04-10
Attending: EMERGENCY MEDICINE
Payer: MEDICAID

## 2019-04-10 ENCOUNTER — HOSPITAL ENCOUNTER (EMERGENCY)
Facility: MEDICAL CENTER | Age: 59
End: 2019-04-10
Attending: EMERGENCY MEDICINE
Payer: MEDICAID

## 2019-04-10 VITALS
BODY MASS INDEX: 32.92 KG/M2 | OXYGEN SATURATION: 95 % | DIASTOLIC BLOOD PRESSURE: 85 MMHG | TEMPERATURE: 96.2 F | HEART RATE: 79 BPM | SYSTOLIC BLOOD PRESSURE: 134 MMHG | WEIGHT: 191.8 LBS | RESPIRATION RATE: 16 BRPM

## 2019-04-10 VITALS — SYSTOLIC BLOOD PRESSURE: 144 MMHG | DIASTOLIC BLOOD PRESSURE: 91 MMHG

## 2019-04-10 VITALS — WEIGHT: 198.42 LBS | HEIGHT: 63 IN | BODY MASS INDEX: 35.16 KG/M2

## 2019-04-10 DIAGNOSIS — F10.920 ALCOHOLIC INTOXICATION WITHOUT COMPLICATION (HCC): ICD-10-CM

## 2019-04-10 DIAGNOSIS — E11.9: ICD-10-CM

## 2019-04-10 DIAGNOSIS — F10.220: Primary | ICD-10-CM

## 2019-04-10 DIAGNOSIS — S80.211A KNEE ABRASION, RIGHT, INITIAL ENCOUNTER: ICD-10-CM

## 2019-04-10 DIAGNOSIS — I10: ICD-10-CM

## 2019-04-10 LAB
ALBUMIN SERPL BCP-MCNC: 3.3 G/DL (ref 3.2–4.9)
ALBUMIN/GLOB SERPL: 0.9 G/DL
ALP SERPL-CCNC: 191 U/L (ref 30–99)
ALT SERPL-CCNC: 28 U/L (ref 2–50)
AMPHET UR QL SCN: NEGATIVE
ANION GAP SERPL CALC-SCNC: 16 MMOL/L (ref 0–11.9)
APAP SERPL-MCNC: <10 UG/ML (ref 10–30)
AST SERPL-CCNC: 79 U/L (ref 12–45)
BARBITURATES UR QL SCN: NEGATIVE
BASOPHILS # BLD AUTO: 1.7 % (ref 0–1.8)
BASOPHILS # BLD: 0.07 K/UL (ref 0–0.12)
BENZODIAZ UR QL SCN: POSITIVE
BILIRUB SERPL-MCNC: 0.2 MG/DL (ref 0.1–1.5)
BUN SERPL-MCNC: 11 MG/DL (ref 8–22)
BZE UR QL SCN: NEGATIVE
CALCIUM SERPL-MCNC: 8.1 MG/DL (ref 8.5–10.5)
CANNABINOIDS UR QL SCN: NEGATIVE
CHLORIDE SERPL-SCNC: 110 MMOL/L (ref 96–112)
CO2 SERPL-SCNC: 17 MMOL/L (ref 20–33)
CREAT SERPL-MCNC: 0.56 MG/DL (ref 0.5–1.4)
EOSINOPHIL # BLD AUTO: 0.07 K/UL (ref 0–0.51)
EOSINOPHIL NFR BLD: 1.7 % (ref 0–6.9)
ERYTHROCYTE [DISTWIDTH] IN BLOOD BY AUTOMATED COUNT: 60.3 FL (ref 35.9–50)
GLOBULIN SER CALC-MCNC: 3.5 G/DL (ref 1.9–3.5)
GLUCOSE SERPL-MCNC: 59 MG/DL (ref 65–99)
HCT VFR BLD AUTO: 46.8 % (ref 37–47)
HGB BLD-MCNC: 14.3 G/DL (ref 12–16)
IMM GRANULOCYTES # BLD AUTO: 0.01 K/UL (ref 0–0.11)
IMM GRANULOCYTES NFR BLD AUTO: 0.2 % (ref 0–0.9)
LIPASE SERPL-CCNC: 73 U/L (ref 11–82)
LYMPHOCYTES # BLD AUTO: 2.15 K/UL (ref 1–4.8)
LYMPHOCYTES NFR BLD: 52.7 % (ref 22–41)
MCH RBC QN AUTO: 31.7 PG (ref 27–33)
MCHC RBC AUTO-ENTMCNC: 30.6 G/DL (ref 33.6–35)
MCV RBC AUTO: 103.8 FL (ref 81.4–97.8)
METHADONE UR QL SCN: NEGATIVE
MONOCYTES # BLD AUTO: 0.26 K/UL (ref 0–0.85)
MONOCYTES NFR BLD AUTO: 6.4 % (ref 0–13.4)
NEUTROPHILS # BLD AUTO: 1.52 K/UL (ref 2–7.15)
NEUTROPHILS NFR BLD: 37.3 % (ref 44–72)
NRBC # BLD AUTO: 0 K/UL
NRBC BLD-RTO: 0 /100 WBC
OPIATES UR QL SCN: NEGATIVE
OXYCODONE UR QL SCN: NEGATIVE
PCP UR QL SCN: NEGATIVE
PLATELET # BLD AUTO: 187 K/UL (ref 164–446)
PMV BLD AUTO: 10 FL (ref 9–12.9)
POC BREATHALIZER: 0.25 PERCENT (ref 0–0.01)
POTASSIUM SERPL-SCNC: 3.8 MMOL/L (ref 3.6–5.5)
PROPOXYPH UR QL SCN: NEGATIVE
PROT SERPL-MCNC: 6.8 G/DL (ref 6–8.2)
RBC # BLD AUTO: 4.51 M/UL (ref 4.2–5.4)
SALICYLATES SERPL-MCNC: 0 MG/DL (ref 15–25)
SODIUM SERPL-SCNC: 143 MMOL/L (ref 135–145)
WBC # BLD AUTO: 4.1 K/UL (ref 4.8–10.8)

## 2019-04-10 PROCEDURE — 83690 ASSAY OF LIPASE: CPT

## 2019-04-10 PROCEDURE — 99285 EMERGENCY DEPT VISIT HI MDM: CPT

## 2019-04-10 PROCEDURE — 99283 EMERGENCY DEPT VISIT LOW MDM: CPT

## 2019-04-10 PROCEDURE — 302970 POC BREATHALIZER: Performed by: EMERGENCY MEDICINE

## 2019-04-10 PROCEDURE — 80053 COMPREHEN METABOLIC PANEL: CPT

## 2019-04-10 PROCEDURE — 85025 COMPLETE CBC W/AUTO DIFF WBC: CPT

## 2019-04-10 PROCEDURE — 73562 X-RAY EXAM OF KNEE 3: CPT | Mod: RT

## 2019-04-10 PROCEDURE — 80307 DRUG TEST PRSMV CHEM ANLYZR: CPT

## 2019-04-10 ASSESSMENT — LIFESTYLE VARIABLES
HAVE PEOPLE ANNOYED YOU BY CRITICIZING YOUR DRINKING: YES
TOTAL SCORE: 3
ORIENTATION AND CLOUDING OF SENSORIUM: ORIENTED AND CAN DO SERIAL ADDITIONS
DO YOU DRINK ALCOHOL: YES
VISUAL DISTURBANCES: NOT PRESENT
NAUSEA AND VOMITING: MILD NAUSEA WITH NO VOMITING
CONSUMPTION TOTAL: POSITIVE
ANXIETY: NO ANXIETY (AT EASE)
TREMOR: TREMOR NOT VISIBLE BUT CAN BE FELT, FINGERTIP TO FINGERTIP
PAROXYSMAL SWEATS: NO SWEAT VISIBLE
EVER FELT BAD OR GUILTY ABOUT YOUR DRINKING: YES
AGITATION: NORMAL ACTIVITY
TOTAL SCORE: 4
AVERAGE NUMBER OF DAYS PER WEEK YOU HAVE A DRINK CONTAINING ALCOHOL: 6
AUDITORY DISTURBANCES: NOT PRESENT
ON A TYPICAL DAY WHEN YOU DRINK ALCOHOL HOW MANY DRINKS DO YOU HAVE: 6
HOW MANY TIMES IN THE PAST YEAR HAVE YOU HAD 5 OR MORE DRINKS IN A DAY: 5
HEADACHE, FULLNESS IN HEAD: VERY MILD
EVER HAD A DRINK FIRST THING IN THE MORNING TO STEADY YOUR NERVES TO GET RID OF A HANGOVER: YES
TOTAL SCORE: 4
HAVE YOU EVER FELT YOU SHOULD CUT DOWN ON YOUR DRINKING: YES
TOTAL SCORE: 4

## 2019-04-10 NOTE — ED TRIAGE NOTES
Present via EMS d/t EtOH intoxication, unknown intake, empty bottle vodka at pt side on EMS arrival

## 2019-04-10 NOTE — DISCHARGE INSTRUCTIONS
Continue to drink plenty of fluids.  I do think you need help with your alcohol abuse and I recommend trying to follow-up with [facility] as they accept your insurance.  If you develop any vomiting, abdominal pain, pass out or have any other concerns return

## 2019-04-10 NOTE — ED PROVIDER NOTES
ED Provider Note    Scribed for Marcela Davis M.D. by Nav Tavares. 4/10/2019, 11:23 AM.    Primary care provider: Pcp Pt States None  Means of arrival: EMS  History obtained from: EMS, patient  History limited by: poor historian    CHIEF COMPLAINT  Chief Complaint   Patient presents with   • Alcohol Intoxication       HPI  Ashleigh Marquis is a 58 y.o. female who presents to the Emergency Department for evaluation of alcohol intoxication and altered mental status. EMS reports that the patient was found altered with an empty bottle of alcohol nearby. Patient was found to be altered and EMS transported to the ED for evaluation. Patient does not have any medical complaint at this time and denies any medical needs.     Further HPI cannot be obtained as the patient is a poor historian. .    REVIEW OF SYSTEMS  Pertinent positives include altered mental status, alcohol intoxication. Pertinent negatives include no medical complaints. All other systems reviewed and negative.     Further ROS cannot be obtained as the patient is a poor historian. .    PAST MEDICAL HISTORY   has a past medical history of Alcoholism ([facility]); Anxiety; Arthritis; ASTHMA; Cancer (HCC) (1981); Chronic low back pain; Congestive heart failure ([facility]); Depression; EtOH dependence ([facility]); Fall; GERD (gastroesophageal reflux disease); HTN; Hypertension; Indigestion; Muscle disorder; OSTEOPOROSIS; Other specified symptom associated with female genital organs; Psychiatric disorder; PTSD (post-traumatic stress disorder); Renal disorder; Seizure ([facility]); Ulcer; and Vitamin D deficiency.    SURGICAL HISTORY   has a past surgical history that includes hernia repair (1977); cysto stent placemnt pre surg (10/7/2010); cystoscopy stent placement (11/9/2010); ureteroscopy (11/9/2010); lasertripsy (11/9/2010); stent removal (11/9/2010); and gastroscopy-endo (N/A, 10/3/2018).    SOCIAL HISTORY  Social History   Substance Use Topics   • Smoking status: Current Every  Day Smoker     Packs/day: 0.50     Years: 20.00     Types: Cigarettes   • Smokeless tobacco: Never Used      Comment: 1/2 pack per day   • Alcohol use Yes      Comment: vodka, heavy use      History   Drug Use No       FAMILY HISTORY  Family History   Problem Relation Age of Onset   • Heart Disease Father    • Hypertension Father    • Diabetes Father    • Cancer Paternal Grandmother    • Depression Other    • Lung Disease Neg Hx    • Stroke Neg Hx        CURRENT MEDICATIONS  Home Medications     Reviewed by Dacia Alvarado R.N. (Registered Nurse) on 04/10/19 at 1014  Med List Status: Partial   Medication Last Dose Status   clonazePAM (KLONOPIN) 0.5 MG Tab  Active   lisinopril (PRINIVIL) 10 MG Tab  Active   LORazepam (ATIVAN) 2 MG tablet  Active   pantoprazole (PROTONIX) 40 MG Tablet Delayed Response  Active   spironolactone (ALDACTONE) 25 MG Tab  Active                ALLERGIES  Allergies   Allergen Reactions   • Sulfa Drugs Vomiting   • Toradol Vomiting   • Gabapentin      Bowel incontinence     • Amoxicillin Rash     Reported by NAIDA on arrival to ED    Pt states she takes PCN 10/3       PHYSICAL EXAM  VITAL SIGNS: /72   Pulse 72   Temp (!) 35.7 °C (96.2 °F) (Temporal)   Resp 18   Wt 87 kg (191 lb 12.8 oz)   LMP 11/02/2015   BMI 32.92 kg/m²     Constitutional: Disheveled. Well developed, No acute distress, Non-toxic appearance.   HENT: Normocephalic, Atraumatic, Bilateral external ears normal, Oropharynx moist, No oral exudates, Nose normal. No evidence of head trauma  Eyes: PERRL, EOMI, Conjunctiva injected.   Neck: Normal range of motion, No tenderness, Supple,   Cardiovascular: Normal heart rate, Normal rhythm, .   Thorax & Lungs: Normal breath sounds, No respiratory distress, No chest tenderness.   Abdomen: Benign abdominal exam, no guarding no rebound, no masses, no pulsatile mass, no tenderness, no distention  Skin: Warm, Dry, No erythema, right knee abrasions  Back: No tenderness, No CVA  tenderness.   Extremities: Intact distal pulses, No edema, right knee with tenderness to palpation but no obvious swelling, no deformities, no warmth or erythema,  Neurologic: Alert & oriented x 1, Normal motor function, Normal sensory function, No focal deficits noted.   Psychiatric: Appropriate                                                     DIAGNOSTIC STUDIES / PROCEDURES\    LABS  Results for orders placed or performed during the hospital encounter of 04/10/19   URINE DRUG SCREEN (TRIAGE)   Result Value Ref Range    Amphetamines Urine Negative Negative    Barbiturates Negative Negative    Benzodiazepines Positive (A) Negative    Cocaine Metabolite Negative Negative    Methadone Negative Negative    Opiates Negative Negative    Oxycodone Negative Negative    Phencyclidine -Pcp Negative Negative    Propoxyphene Negative Negative    Cannabinoid Metab Negative Negative   CBC WITH DIFFERENTIAL   Result Value Ref Range    WBC 4.1 (L) 4.8 - 10.8 K/uL    RBC 4.51 4.20 - 5.40 M/uL    Hemoglobin 14.3 12.0 - 16.0 g/dL    Hematocrit 46.8 37.0 - 47.0 %    .8 (H) 81.4 - 97.8 fL    MCH 31.7 27.0 - 33.0 pg    MCHC 30.6 (L) 33.6 - 35.0 g/dL    RDW 60.3 (H) 35.9 - 50.0 fL    Platelet Count 187 164 - 446 K/uL    MPV 10.0 9.0 - 12.9 fL    Neutrophils-Polys 37.30 (L) 44.00 - 72.00 %    Lymphocytes 52.70 (H) 22.00 - 41.00 %    Monocytes 6.40 0.00 - 13.40 %    Eosinophils 1.70 0.00 - 6.90 %    Basophils 1.70 0.00 - 1.80 %    Immature Granulocytes 0.20 0.00 - 0.90 %    Nucleated RBC 0.00 /100 WBC    Neutrophils (Absolute) 1.52 (L) 2.00 - 7.15 K/uL    Lymphs (Absolute) 2.15 1.00 - 4.80 K/uL    Monos (Absolute) 0.26 0.00 - 0.85 K/uL    Eos (Absolute) 0.07 0.00 - 0.51 K/uL    Baso (Absolute) 0.07 0.00 - 0.12 K/uL    Immature Granulocytes (abs) 0.01 0.00 - 0.11 K/uL    NRBC (Absolute) 0.00 K/uL   COMP METABOLIC PANEL   Result Value Ref Range    Sodium 143 135 - 145 mmol/L    Potassium 3.8 3.6 - 5.5 mmol/L    Chloride 110 96 -  112 mmol/L    Co2 17 (L) 20 - 33 mmol/L    Anion Gap 16.0 (H) 0.0 - 11.9    Glucose 59 (L) 65 - 99 mg/dL    Bun 11 8 - 22 mg/dL    Creatinine 0.56 0.50 - 1.40 mg/dL    Calcium 8.1 (L) 8.5 - 10.5 mg/dL    AST(SGOT) 79 (H) 12 - 45 U/L    ALT(SGPT) 28 2 - 50 U/L    Alkaline Phosphatase 191 (H) 30 - 99 U/L    Total Bilirubin 0.2 0.1 - 1.5 mg/dL    Albumin 3.3 3.2 - 4.9 g/dL    Total Protein 6.8 6.0 - 8.2 g/dL    Globulin 3.5 1.9 - 3.5 g/dL    A-G Ratio 0.9 g/dL   LIPASE   Result Value Ref Range    Lipase 73 11 - 82 U/L   Acetaminophen Level   Result Value Ref Range    Acetaminophen -Tylenol <10 10 - 30 ug/mL   SALICYLATE LEVEL   Result Value Ref Range    Salicylates, Quant. 0 (L) 15 - 25 mg/dL   ESTIMATED GFR   Result Value Ref Range    GFR If African American >60 >60 mL/min/1.73 m 2    GFR If Non African American >60 >60 mL/min/1.73 m 2   POC BREATHALIZER   Result Value Ref Range    POC Breathalizer 0.25 (A) 0.00 - 0.01 Percent   All labs reviewed by me.    COURSE & MEDICAL DECISION MAKING  Nursing notes, VS, PMSFHx reviewed in chart.     Patient presents to the emergency department with alcohol intoxication.  She is a poor historian and I cannot get any further information from her.  Does not sound like she ingested any pills.  I do not see any obvious evidence of trauma on physical exam.  Plan is to obtain baseline labs.  Will await for her to sober for reevaluation.    11:23 AM Patient seen and examined at bedside. The patient presents with altered mental status and the differential diagnosis includes but is not limited to alcohol intoxication, electrolyte abnormality, dehydration. Ordered for POC breathalyzer, Urine drug screen, CBC with differential, CMP, Lipase, Acetaminophen level , Salicylate levle to evaluate.     1:15 PM - Patient reevaluated at bedside. She is sleeping.    3:40PM -patient is alert eating a sandwich she is complaining of knee pain.  She states she hit her knee on the concrete.  She is not  having any numbness or tingling or coolness.  She does have a small abrasion on her knee.  Patient has been seen by assessment and has been given referrals to [facility] for her alcohol abuse.  It appears patient has long history of alcohol abuse and has been intermittently in and out of hospitals for alcohol withdrawal as well as alcohol intoxication.  Patient does not currently have any suicidal or homicidal ideations.  Patient is alert and I believe stable for outpatient management.  Patient was stable to tolerate a meal without any difficulty.  If x-ray is negative she will be stable for discharge and understands her follow-up plan.    Patient's labs do show evidence of a mild acidosis which I believe is likely secondary to alcoholic ketoacidosis.  Since she is tolerating po and drinking fluids I do not feel she needs any further medical interventions for this.          DISPOSITION:  Care of the patient will be transferred to my partner.     FINAL IMPRESSION  1. Alcoholic intoxication without complication (HCC)    2. Knee abrasion, right, initial encounter          I, Nav Tavares (Scribe), am scribing for, and in the presence of, Marcela Davis M.D..    Electronically signed by: Nav Tavares (Scribe), 4/10/2019    IMarcela M.D. personally performed the services described in this documentation, as scribed by Nav Tavares in my presence, and it is both accurate and complete.    The note accurately reflects work and decisions made by me.  Marcela Davis  4/10/2019  3:59 PM

## 2019-04-11 ENCOUNTER — HOSPITAL ENCOUNTER (INPATIENT)
Dept: HOSPITAL 8 - ED | Age: 59
LOS: 4 days | Discharge: HOME | DRG: 379 | End: 2019-04-15
Attending: HOSPITALIST | Admitting: HOSPITALIST
Payer: MEDICAID

## 2019-04-11 ENCOUNTER — HOSPITAL ENCOUNTER (EMERGENCY)
Dept: HOSPITAL 8 - ED | Age: 59
End: 2019-04-11
Payer: MEDICAID

## 2019-04-11 VITALS — SYSTOLIC BLOOD PRESSURE: 143 MMHG | DIASTOLIC BLOOD PRESSURE: 85 MMHG

## 2019-04-11 VITALS — BODY MASS INDEX: 33.98 KG/M2 | WEIGHT: 191.8 LBS | HEIGHT: 63 IN

## 2019-04-11 VITALS — WEIGHT: 182.1 LBS | HEIGHT: 69 IN | BODY MASS INDEX: 26.97 KG/M2

## 2019-04-11 VITALS — DIASTOLIC BLOOD PRESSURE: 75 MMHG | SYSTOLIC BLOOD PRESSURE: 132 MMHG

## 2019-04-11 VITALS — SYSTOLIC BLOOD PRESSURE: 145 MMHG | DIASTOLIC BLOOD PRESSURE: 89 MMHG

## 2019-04-11 DIAGNOSIS — F32.9: ICD-10-CM

## 2019-04-11 DIAGNOSIS — Z82.49: ICD-10-CM

## 2019-04-11 DIAGNOSIS — E11.9: ICD-10-CM

## 2019-04-11 DIAGNOSIS — K21.9: ICD-10-CM

## 2019-04-11 DIAGNOSIS — F10.220: Primary | ICD-10-CM

## 2019-04-11 DIAGNOSIS — Z83.3: ICD-10-CM

## 2019-04-11 DIAGNOSIS — F10.229: ICD-10-CM

## 2019-04-11 DIAGNOSIS — E83.42: ICD-10-CM

## 2019-04-11 DIAGNOSIS — K29.21: Primary | ICD-10-CM

## 2019-04-11 DIAGNOSIS — Z88.6: ICD-10-CM

## 2019-04-11 DIAGNOSIS — E86.0: ICD-10-CM

## 2019-04-11 DIAGNOSIS — I10: ICD-10-CM

## 2019-04-11 DIAGNOSIS — F43.10: ICD-10-CM

## 2019-04-11 DIAGNOSIS — E87.6: ICD-10-CM

## 2019-04-11 DIAGNOSIS — Z87.442: ICD-10-CM

## 2019-04-11 DIAGNOSIS — Z72.9: ICD-10-CM

## 2019-04-11 DIAGNOSIS — F43.20: ICD-10-CM

## 2019-04-11 DIAGNOSIS — F17.200: ICD-10-CM

## 2019-04-11 DIAGNOSIS — Z87.440: ICD-10-CM

## 2019-04-11 DIAGNOSIS — Z88.8: ICD-10-CM

## 2019-04-11 DIAGNOSIS — Z59.0: ICD-10-CM

## 2019-04-11 DIAGNOSIS — Z88.2: ICD-10-CM

## 2019-04-11 LAB
ALBUMIN SERPL-MCNC: 3.2 G/DL (ref 3.4–5)
ALP SERPL-CCNC: 207 U/L (ref 45–117)
ALT SERPL-CCNC: 43 U/L (ref 12–78)
ANION GAP SERPL CALC-SCNC: 10 MMOL/L (ref 5–15)
BASOPHILS # BLD AUTO: 0.04 X10^3/UL (ref 0–0.1)
BASOPHILS NFR BLD AUTO: 1 % (ref 0–1)
BILIRUB SERPL-MCNC: 0.4 MG/DL (ref 0.2–1)
CALCIUM SERPL-MCNC: 8.2 MG/DL (ref 8.5–10.1)
CHLORIDE SERPL-SCNC: 108 MMOL/L (ref 98–107)
CREAT SERPL-MCNC: 0.56 MG/DL (ref 0.55–1.02)
EOSINOPHIL # BLD AUTO: 0.05 X10^3/UL (ref 0–0.4)
EOSINOPHIL NFR BLD AUTO: 1 % (ref 1–7)
ERYTHROCYTE [DISTWIDTH] IN BLOOD BY AUTOMATED COUNT: 17.3 % (ref 9.6–15.2)
INR PPP: 0.92 (ref 0.93–1.1)
LYMPHOCYTES # BLD AUTO: 1.97 X10^3/UL (ref 1–3.4)
LYMPHOCYTES NFR BLD AUTO: 38 % (ref 22–44)
MCH RBC QN AUTO: 31.2 PG (ref 27–34.8)
MCHC RBC AUTO-ENTMCNC: 33.2 G/DL (ref 32.4–35.8)
MCV RBC AUTO: 93.9 FL (ref 80–100)
MD: NO
MONOCYTES # BLD AUTO: 0.4 X10^3/UL (ref 0.2–0.8)
MONOCYTES NFR BLD AUTO: 8 % (ref 2–9)
NEUTROPHILS # BLD AUTO: 2.68 X10^3/UL (ref 1.8–6.8)
NEUTROPHILS NFR BLD AUTO: 52 % (ref 42–75)
PLATELET # BLD AUTO: 228 X10^3/UL (ref 130–400)
PMV BLD AUTO: 7.6 FL (ref 7.4–10.4)
PROT SERPL-MCNC: 6.7 G/DL (ref 6.4–8.2)
PROTHROMBIN TIME: 9.7 SECONDS (ref 9.6–11.5)
RBC # BLD AUTO: 4.1 X10^6/UL (ref 3.82–5.3)
T4 FREE SERPL-MCNC: 0.77 NG/DL (ref 0.76–1.46)
TSH SERPL-ACNC: 0.99 MIU/L (ref 0.36–3.74)

## 2019-04-11 PROCEDURE — 83690 ASSAY OF LIPASE: CPT

## 2019-04-11 PROCEDURE — C9113 INJ PANTOPRAZOLE SODIUM, VIA: HCPCS

## 2019-04-11 PROCEDURE — 80061 LIPID PANEL: CPT

## 2019-04-11 PROCEDURE — 85025 COMPLETE CBC W/AUTO DIFF WBC: CPT

## 2019-04-11 PROCEDURE — 83735 ASSAY OF MAGNESIUM: CPT

## 2019-04-11 PROCEDURE — 84443 ASSAY THYROID STIM HORMONE: CPT

## 2019-04-11 PROCEDURE — 99285 EMERGENCY DEPT VISIT HI MDM: CPT

## 2019-04-11 PROCEDURE — 87324 CLOSTRIDIUM AG IA: CPT

## 2019-04-11 PROCEDURE — 87493 C DIFF AMPLIFIED PROBE: CPT

## 2019-04-11 PROCEDURE — 93005 ELECTROCARDIOGRAM TRACING: CPT

## 2019-04-11 PROCEDURE — 99283 EMERGENCY DEPT VISIT LOW MDM: CPT

## 2019-04-11 PROCEDURE — 36415 COLL VENOUS BLD VENIPUNCTURE: CPT

## 2019-04-11 PROCEDURE — 80053 COMPREHEN METABOLIC PANEL: CPT

## 2019-04-11 PROCEDURE — 96374 THER/PROPH/DIAG INJ IV PUSH: CPT

## 2019-04-11 PROCEDURE — 84439 ASSAY OF FREE THYROXINE: CPT

## 2019-04-11 PROCEDURE — 85610 PROTHROMBIN TIME: CPT

## 2019-04-11 PROCEDURE — 80048 BASIC METABOLIC PNL TOTAL CA: CPT

## 2019-04-11 RX ADMIN — POTASSIUM CHLORIDE SCH MLS/HR: 2 INJECTION, SOLUTION, CONCENTRATE INTRAVENOUS at 15:21

## 2019-04-11 RX ADMIN — NICOTINE SCH PATCH: 21 PATCH, EXTENDED RELEASE TRANSDERMAL at 16:45

## 2019-04-11 SDOH — ECONOMIC STABILITY - HOUSING INSECURITY: HOMELESSNESS: Z59.0

## 2019-04-12 VITALS — SYSTOLIC BLOOD PRESSURE: 146 MMHG | DIASTOLIC BLOOD PRESSURE: 84 MMHG

## 2019-04-12 VITALS — DIASTOLIC BLOOD PRESSURE: 94 MMHG | SYSTOLIC BLOOD PRESSURE: 155 MMHG

## 2019-04-12 VITALS — SYSTOLIC BLOOD PRESSURE: 132 MMHG | DIASTOLIC BLOOD PRESSURE: 82 MMHG

## 2019-04-12 VITALS — SYSTOLIC BLOOD PRESSURE: 154 MMHG | DIASTOLIC BLOOD PRESSURE: 83 MMHG

## 2019-04-12 LAB
ANION GAP SERPL CALC-SCNC: 13 MMOL/L (ref 5–15)
BASOPHILS # BLD AUTO: 0.03 X10^3/UL (ref 0–0.1)
BASOPHILS NFR BLD AUTO: 1 % (ref 0–1)
CALCIUM SERPL-MCNC: 8.5 MG/DL (ref 8.5–10.1)
CHLORIDE SERPL-SCNC: 108 MMOL/L (ref 98–107)
CHOL/HDL RATIO: 1.7
CLOSTRIDIUM DIFFICILE ANTIGEN: POSITIVE
CLOSTRIDIUM DIFFICILE TOXIN: NEGATIVE
CREAT SERPL-MCNC: 0.69 MG/DL (ref 0.55–1.02)
EOSINOPHIL # BLD AUTO: 0.04 X10^3/UL (ref 0–0.4)
EOSINOPHIL NFR BLD AUTO: 1 % (ref 1–7)
ERYTHROCYTE [DISTWIDTH] IN BLOOD BY AUTOMATED COUNT: 17.2 % (ref 9.6–15.2)
HDL CHOL %: 57 % (ref 28–40)
HDL CHOLESTEROL (DIRECT): 103 MG/DL (ref 40–60)
LDL CHOLESTEROL,CALCULATED: 65 MG/DL (ref 54–169)
LDLC/HDLC SERPL: 0.6 {RATIO} (ref 0.5–3)
LYMPHOCYTES # BLD AUTO: 1.35 X10^3/UL (ref 1–3.4)
LYMPHOCYTES NFR BLD AUTO: 31 % (ref 22–44)
MCH RBC QN AUTO: 31.5 PG (ref 27–34.8)
MCHC RBC AUTO-ENTMCNC: 33.6 G/DL (ref 32.4–35.8)
MCV RBC AUTO: 93.7 FL (ref 80–100)
MD: NO
MONOCYTES # BLD AUTO: 0.37 X10^3/UL (ref 0.2–0.8)
MONOCYTES NFR BLD AUTO: 8 % (ref 2–9)
NEUTROPHILS # BLD AUTO: 2.62 X10^3/UL (ref 1.8–6.8)
NEUTROPHILS NFR BLD AUTO: 59 % (ref 42–75)
PLATELET # BLD AUTO: 180 X10^3/UL (ref 130–400)
PMV BLD AUTO: 8.1 FL (ref 7.4–10.4)
RBC # BLD AUTO: 3.83 X10^6/UL (ref 3.82–5.3)
TRIGL SERPL-MCNC: 61 MG/DL (ref 50–200)
VLDLC SERPL CALC-MCNC: 12 MG/DL (ref 0–25)

## 2019-04-12 RX ADMIN — ACETAMINOPHEN PRN MG: 325 TABLET, FILM COATED ORAL at 17:41

## 2019-04-12 RX ADMIN — LORAZEPAM PRN MG: 2 INJECTION INTRAMUSCULAR; INTRAVENOUS at 04:44

## 2019-04-12 RX ADMIN — POTASSIUM CHLORIDE SCH MLS/HR: 2 INJECTION, SOLUTION, CONCENTRATE INTRAVENOUS at 17:41

## 2019-04-12 RX ADMIN — ONDANSETRON PRN MG: 4 TABLET, ORALLY DISINTEGRATING ORAL at 14:02

## 2019-04-12 RX ADMIN — PANTOPRAZOLE SODIUM SCH MG: 40 INJECTION, POWDER, FOR SOLUTION INTRAVENOUS at 09:08

## 2019-04-12 RX ADMIN — LORAZEPAM PRN MG: 2 INJECTION INTRAMUSCULAR; INTRAVENOUS at 12:04

## 2019-04-12 RX ADMIN — LORAZEPAM PRN MG: 2 INJECTION INTRAMUSCULAR; INTRAVENOUS at 09:19

## 2019-04-12 RX ADMIN — LORAZEPAM PRN MG: 2 INJECTION INTRAMUSCULAR; INTRAVENOUS at 14:02

## 2019-04-12 RX ADMIN — LORAZEPAM PRN MG: 2 INJECTION INTRAMUSCULAR; INTRAVENOUS at 05:48

## 2019-04-12 RX ADMIN — NICOTINE SCH PATCH: 21 PATCH, EXTENDED RELEASE TRANSDERMAL at 17:41

## 2019-04-12 RX ADMIN — LORAZEPAM PRN MG: 2 INJECTION INTRAMUSCULAR; INTRAVENOUS at 06:43

## 2019-04-12 RX ADMIN — ONDANSETRON PRN MG: 4 TABLET, ORALLY DISINTEGRATING ORAL at 04:27

## 2019-04-13 VITALS — SYSTOLIC BLOOD PRESSURE: 130 MMHG | DIASTOLIC BLOOD PRESSURE: 94 MMHG

## 2019-04-13 VITALS — SYSTOLIC BLOOD PRESSURE: 140 MMHG | DIASTOLIC BLOOD PRESSURE: 87 MMHG

## 2019-04-13 VITALS — DIASTOLIC BLOOD PRESSURE: 79 MMHG | SYSTOLIC BLOOD PRESSURE: 153 MMHG

## 2019-04-13 VITALS — DIASTOLIC BLOOD PRESSURE: 98 MMHG | SYSTOLIC BLOOD PRESSURE: 159 MMHG

## 2019-04-13 LAB
ANION GAP SERPL CALC-SCNC: 7 MMOL/L (ref 5–15)
BASOPHILS # BLD AUTO: 0.02 X10^3/UL (ref 0–0.1)
BASOPHILS NFR BLD AUTO: 1 % (ref 0–1)
CALCIUM SERPL-MCNC: 8.2 MG/DL (ref 8.5–10.1)
CHLORIDE SERPL-SCNC: 107 MMOL/L (ref 98–107)
CREAT SERPL-MCNC: 0.66 MG/DL (ref 0.55–1.02)
EOSINOPHIL # BLD AUTO: 0.1 X10^3/UL (ref 0–0.4)
EOSINOPHIL NFR BLD AUTO: 3 % (ref 1–7)
ERYTHROCYTE [DISTWIDTH] IN BLOOD BY AUTOMATED COUNT: 17.1 % (ref 9.6–15.2)
LYMPHOCYTES # BLD AUTO: 1.25 X10^3/UL (ref 1–3.4)
LYMPHOCYTES NFR BLD AUTO: 29 % (ref 22–44)
MCH RBC QN AUTO: 31.6 PG (ref 27–34.8)
MCHC RBC AUTO-ENTMCNC: 33.4 G/DL (ref 32.4–35.8)
MCV RBC AUTO: 94.7 FL (ref 80–100)
MD: NO
MONOCYTES # BLD AUTO: 0.4 X10^3/UL (ref 0.2–0.8)
MONOCYTES NFR BLD AUTO: 10 % (ref 2–9)
NEUTROPHILS # BLD AUTO: 2.47 X10^3/UL (ref 1.8–6.8)
NEUTROPHILS NFR BLD AUTO: 58 % (ref 42–75)
PLATELET # BLD AUTO: 163 X10^3/UL (ref 130–400)
PMV BLD AUTO: 8.2 FL (ref 7.4–10.4)
RBC # BLD AUTO: 4.01 X10^6/UL (ref 3.82–5.3)

## 2019-04-13 RX ADMIN — NICOTINE SCH PATCH: 21 PATCH, EXTENDED RELEASE TRANSDERMAL at 20:05

## 2019-04-13 RX ADMIN — ONDANSETRON PRN MG: 4 TABLET, ORALLY DISINTEGRATING ORAL at 12:20

## 2019-04-13 RX ADMIN — PANTOPRAZOLE SODIUM SCH MG: 40 INJECTION, POWDER, FOR SOLUTION INTRAVENOUS at 08:47

## 2019-04-13 RX ADMIN — POTASSIUM CHLORIDE SCH MLS/HR: 2 INJECTION, SOLUTION, CONCENTRATE INTRAVENOUS at 21:00

## 2019-04-13 RX ADMIN — ACETAMINOPHEN PRN MG: 325 TABLET, FILM COATED ORAL at 15:42

## 2019-04-13 RX ADMIN — ONDANSETRON PRN MG: 4 TABLET, ORALLY DISINTEGRATING ORAL at 06:44

## 2019-04-13 RX ADMIN — ACETAMINOPHEN PRN MG: 325 TABLET, FILM COATED ORAL at 06:44

## 2019-04-14 VITALS — DIASTOLIC BLOOD PRESSURE: 71 MMHG | SYSTOLIC BLOOD PRESSURE: 143 MMHG

## 2019-04-14 VITALS — SYSTOLIC BLOOD PRESSURE: 128 MMHG | DIASTOLIC BLOOD PRESSURE: 80 MMHG

## 2019-04-14 VITALS — DIASTOLIC BLOOD PRESSURE: 88 MMHG | SYSTOLIC BLOOD PRESSURE: 144 MMHG

## 2019-04-14 VITALS — DIASTOLIC BLOOD PRESSURE: 88 MMHG | SYSTOLIC BLOOD PRESSURE: 127 MMHG

## 2019-04-14 LAB
ANION GAP SERPL CALC-SCNC: 6 MMOL/L (ref 5–15)
BASOPHILS # BLD AUTO: 0.02 X10^3/UL (ref 0–0.1)
BASOPHILS NFR BLD AUTO: 1 % (ref 0–1)
CALCIUM SERPL-MCNC: 7.9 MG/DL (ref 8.5–10.1)
CHLORIDE SERPL-SCNC: 109 MMOL/L (ref 98–107)
CREAT SERPL-MCNC: 0.67 MG/DL (ref 0.55–1.02)
EOSINOPHIL # BLD AUTO: 0.09 X10^3/UL (ref 0–0.4)
EOSINOPHIL NFR BLD AUTO: 2 % (ref 1–7)
ERYTHROCYTE [DISTWIDTH] IN BLOOD BY AUTOMATED COUNT: 17.2 % (ref 9.6–15.2)
LYMPHOCYTES # BLD AUTO: 1.19 X10^3/UL (ref 1–3.4)
LYMPHOCYTES NFR BLD AUTO: 27 % (ref 22–44)
MCH RBC QN AUTO: 32.2 PG (ref 27–34.8)
MCHC RBC AUTO-ENTMCNC: 33.9 G/DL (ref 32.4–35.8)
MCV RBC AUTO: 95.1 FL (ref 80–100)
MD: NO
MONOCYTES # BLD AUTO: 0.46 X10^3/UL (ref 0.2–0.8)
MONOCYTES NFR BLD AUTO: 11 % (ref 2–9)
NEUTROPHILS # BLD AUTO: 2.6 X10^3/UL (ref 1.8–6.8)
NEUTROPHILS NFR BLD AUTO: 60 % (ref 42–75)
PLATELET # BLD AUTO: 146 X10^3/UL (ref 130–400)
PMV BLD AUTO: 8.2 FL (ref 7.4–10.4)
RBC # BLD AUTO: 3.75 X10^6/UL (ref 3.82–5.3)

## 2019-04-14 RX ADMIN — NICOTINE SCH PATCH: 21 PATCH, EXTENDED RELEASE TRANSDERMAL at 19:42

## 2019-04-14 RX ADMIN — POTASSIUM CHLORIDE SCH MLS/HR: 2 INJECTION, SOLUTION, CONCENTRATE INTRAVENOUS at 21:37

## 2019-04-14 RX ADMIN — ACETAMINOPHEN PRN MG: 325 TABLET, FILM COATED ORAL at 05:19

## 2019-04-14 RX ADMIN — ACETAMINOPHEN PRN MG: 325 TABLET, FILM COATED ORAL at 21:40

## 2019-04-14 RX ADMIN — PANTOPRAZOLE SODIUM SCH MG: 40 TABLET, DELAYED RELEASE ORAL at 05:59

## 2019-04-14 RX ADMIN — LORAZEPAM PRN MG: 2 INJECTION INTRAMUSCULAR; INTRAVENOUS at 09:19

## 2019-04-14 RX ADMIN — ONDANSETRON PRN MG: 4 TABLET, ORALLY DISINTEGRATING ORAL at 09:12

## 2019-04-14 RX ADMIN — ONDANSETRON PRN MG: 4 TABLET, ORALLY DISINTEGRATING ORAL at 09:19

## 2019-04-15 VITALS — SYSTOLIC BLOOD PRESSURE: 103 MMHG | DIASTOLIC BLOOD PRESSURE: 63 MMHG

## 2019-04-15 VITALS — DIASTOLIC BLOOD PRESSURE: 103 MMHG | SYSTOLIC BLOOD PRESSURE: 154 MMHG

## 2019-04-15 VITALS — DIASTOLIC BLOOD PRESSURE: 72 MMHG | SYSTOLIC BLOOD PRESSURE: 107 MMHG

## 2019-04-15 VITALS — SYSTOLIC BLOOD PRESSURE: 131 MMHG | DIASTOLIC BLOOD PRESSURE: 77 MMHG

## 2019-04-15 LAB
ANION GAP SERPL CALC-SCNC: 6 MMOL/L (ref 5–15)
BASOPHILS # BLD AUTO: 0.02 X10^3/UL (ref 0–0.1)
BASOPHILS NFR BLD AUTO: 0 % (ref 0–1)
CALCIUM SERPL-MCNC: 8 MG/DL (ref 8.5–10.1)
CHLORIDE SERPL-SCNC: 112 MMOL/L (ref 98–107)
CREAT SERPL-MCNC: 0.61 MG/DL (ref 0.55–1.02)
EOSINOPHIL # BLD AUTO: 0.09 X10^3/UL (ref 0–0.4)
EOSINOPHIL NFR BLD AUTO: 2 % (ref 1–7)
ERYTHROCYTE [DISTWIDTH] IN BLOOD BY AUTOMATED COUNT: 17.7 % (ref 9.6–15.2)
LYMPHOCYTES # BLD AUTO: 1.45 X10^3/UL (ref 1–3.4)
LYMPHOCYTES NFR BLD AUTO: 29 % (ref 22–44)
MCH RBC QN AUTO: 31.2 PG (ref 27–34.8)
MCHC RBC AUTO-ENTMCNC: 32.7 G/DL (ref 32.4–35.8)
MCV RBC AUTO: 95.6 FL (ref 80–100)
MD: NO
MONOCYTES # BLD AUTO: 0.57 X10^3/UL (ref 0.2–0.8)
MONOCYTES NFR BLD AUTO: 11 % (ref 2–9)
NEUTROPHILS # BLD AUTO: 2.96 X10^3/UL (ref 1.8–6.8)
NEUTROPHILS NFR BLD AUTO: 58 % (ref 42–75)
PLATELET # BLD AUTO: 145 X10^3/UL (ref 130–400)
PMV BLD AUTO: 8.7 FL (ref 7.4–10.4)
RBC # BLD AUTO: 3.84 X10^6/UL (ref 3.82–5.3)

## 2019-04-15 RX ADMIN — PANTOPRAZOLE SODIUM SCH MG: 40 TABLET, DELAYED RELEASE ORAL at 05:46

## 2019-04-15 RX ADMIN — ONDANSETRON PRN MG: 4 TABLET, ORALLY DISINTEGRATING ORAL at 07:49

## 2019-04-15 RX ADMIN — ACETAMINOPHEN PRN MG: 325 TABLET, FILM COATED ORAL at 07:49

## 2019-04-16 ENCOUNTER — HOSPITAL ENCOUNTER (INPATIENT)
Facility: MEDICAL CENTER | Age: 59
LOS: 5 days | DRG: 894 | End: 2019-04-21
Attending: EMERGENCY MEDICINE | Admitting: HOSPITALIST
Payer: MEDICAID

## 2019-04-16 DIAGNOSIS — R10.84 GENERALIZED ABDOMINAL PAIN: ICD-10-CM

## 2019-04-16 DIAGNOSIS — K85.20 ALCOHOL-INDUCED ACUTE PANCREATITIS, UNSPECIFIED COMPLICATION STATUS: ICD-10-CM

## 2019-04-16 DIAGNOSIS — K92.2 GASTROINTESTINAL HEMORRHAGE, UNSPECIFIED GASTROINTESTINAL HEMORRHAGE TYPE: ICD-10-CM

## 2019-04-16 DIAGNOSIS — F10.929 ALCOHOLIC INTOXICATION WITH COMPLICATION (HCC): ICD-10-CM

## 2019-04-16 DIAGNOSIS — K76.82 HEPATIC ENCEPHALOPATHY (HCC): ICD-10-CM

## 2019-04-16 PROBLEM — F99 PSYCHIATRIC DISORDER: Status: ACTIVE | Noted: 2019-04-16

## 2019-04-16 PROBLEM — R19.5 HEME POSITIVE STOOL: Status: ACTIVE | Noted: 2019-04-16

## 2019-04-16 PROBLEM — K92.0 HEMATEMESIS: Status: ACTIVE | Noted: 2019-04-16

## 2019-04-16 PROBLEM — R74.01 TRANSAMINITIS: Status: ACTIVE | Noted: 2019-04-16

## 2019-04-16 LAB
ABO GROUP BLD: NORMAL
ALBUMIN SERPL BCP-MCNC: 3.6 G/DL (ref 3.2–4.9)
ALBUMIN/GLOB SERPL: 1.2 G/DL
ALP SERPL-CCNC: 143 U/L (ref 30–99)
ALT SERPL-CCNC: 30 U/L (ref 2–50)
AMMONIA PLAS-SCNC: 48 UMOL/L (ref 11–45)
ANION GAP SERPL CALC-SCNC: 8 MMOL/L (ref 0–11.9)
ANION GAP SERPL CALC-SCNC: 9 MMOL/L (ref 0–11.9)
APTT PPP: 25.3 SEC (ref 24.7–36)
AST SERPL-CCNC: 62 U/L (ref 12–45)
BASOPHILS # BLD AUTO: 0.9 % (ref 0–1.8)
BASOPHILS # BLD: 0.06 K/UL (ref 0–0.12)
BILIRUB SERPL-MCNC: 0.2 MG/DL (ref 0.1–1.5)
BLD GP AB SCN SERPL QL: NORMAL
BUN SERPL-MCNC: 10 MG/DL (ref 8–22)
BUN SERPL-MCNC: 12 MG/DL (ref 8–22)
CALCIUM SERPL-MCNC: 8.3 MG/DL (ref 8.5–10.5)
CALCIUM SERPL-MCNC: 8.9 MG/DL (ref 8.5–10.5)
CHLORIDE SERPL-SCNC: 107 MMOL/L (ref 96–112)
CHLORIDE SERPL-SCNC: 109 MMOL/L (ref 96–112)
CO2 SERPL-SCNC: 23 MMOL/L (ref 20–33)
CO2 SERPL-SCNC: 23 MMOL/L (ref 20–33)
CREAT SERPL-MCNC: 0.64 MG/DL (ref 0.5–1.4)
CREAT SERPL-MCNC: 0.66 MG/DL (ref 0.5–1.4)
EOSINOPHIL # BLD AUTO: 0.09 K/UL (ref 0–0.51)
EOSINOPHIL NFR BLD: 1.4 % (ref 0–6.9)
ERYTHROCYTE [DISTWIDTH] IN BLOOD BY AUTOMATED COUNT: 59.1 FL (ref 35.9–50)
ETHANOL BLD-MCNC: 0.21 G/DL
GLOBULIN SER CALC-MCNC: 3.1 G/DL (ref 1.9–3.5)
GLUCOSE SERPL-MCNC: 109 MG/DL (ref 65–99)
GLUCOSE SERPL-MCNC: 78 MG/DL (ref 65–99)
HCG SERPL QL: NEGATIVE
HCT VFR BLD AUTO: 34.7 % (ref 37–47)
HCT VFR BLD AUTO: 36.5 % (ref 37–47)
HGB BLD-MCNC: 11 G/DL (ref 12–16)
HGB BLD-MCNC: 11.4 G/DL (ref 12–16)
IMM GRANULOCYTES # BLD AUTO: 0.05 K/UL (ref 0–0.11)
IMM GRANULOCYTES NFR BLD AUTO: 0.8 % (ref 0–0.9)
INR PPP: 0.89 (ref 0.87–1.13)
LIPASE SERPL-CCNC: 89 U/L (ref 11–82)
LYMPHOCYTES # BLD AUTO: 1.87 K/UL (ref 1–4.8)
LYMPHOCYTES NFR BLD: 29.6 % (ref 22–41)
MCH RBC QN AUTO: 31.2 PG (ref 27–33)
MCHC RBC AUTO-ENTMCNC: 30.7 G/DL (ref 33.6–35)
MCV RBC AUTO: 101.7 FL (ref 81.4–97.8)
MONOCYTES # BLD AUTO: 0.69 K/UL (ref 0–0.85)
MONOCYTES NFR BLD AUTO: 10.9 % (ref 0–13.4)
NEUTROPHILS # BLD AUTO: 3.56 K/UL (ref 2–7.15)
NEUTROPHILS NFR BLD: 56.4 % (ref 44–72)
NRBC # BLD AUTO: 0 K/UL
NRBC BLD-RTO: 0 /100 WBC
PLATELET # BLD AUTO: 151 K/UL (ref 164–446)
PMV BLD AUTO: 10.4 FL (ref 9–12.9)
POTASSIUM SERPL-SCNC: 3.4 MMOL/L (ref 3.6–5.5)
POTASSIUM SERPL-SCNC: 3.8 MMOL/L (ref 3.6–5.5)
PROT SERPL-MCNC: 6.7 G/DL (ref 6–8.2)
PROTHROMBIN TIME: 12.1 SEC (ref 12–14.6)
RBC # BLD AUTO: 3.62 M/UL (ref 4.2–5.4)
RH BLD: NORMAL
SODIUM SERPL-SCNC: 138 MMOL/L (ref 135–145)
SODIUM SERPL-SCNC: 141 MMOL/L (ref 135–145)
TSH SERPL DL<=0.005 MIU/L-ACNC: 0.96 UIU/ML (ref 0.38–5.33)
WBC # BLD AUTO: 6.3 K/UL (ref 4.8–10.8)

## 2019-04-16 PROCEDURE — 85730 THROMBOPLASTIN TIME PARTIAL: CPT

## 2019-04-16 PROCEDURE — 85610 PROTHROMBIN TIME: CPT

## 2019-04-16 PROCEDURE — 83690 ASSAY OF LIPASE: CPT

## 2019-04-16 PROCEDURE — 700111 HCHG RX REV CODE 636 W/ 250 OVERRIDE (IP): Performed by: STUDENT IN AN ORGANIZED HEALTH CARE EDUCATION/TRAINING PROGRAM

## 2019-04-16 PROCEDURE — 770020 HCHG ROOM/CARE - TELE (206)

## 2019-04-16 PROCEDURE — 82272 OCCULT BLD FECES 1-3 TESTS: CPT

## 2019-04-16 PROCEDURE — 304561 HCHG STAT O2

## 2019-04-16 PROCEDURE — 700111 HCHG RX REV CODE 636 W/ 250 OVERRIDE (IP)

## 2019-04-16 PROCEDURE — 700101 HCHG RX REV CODE 250: Performed by: EMERGENCY MEDICINE

## 2019-04-16 PROCEDURE — 99223 1ST HOSP IP/OBS HIGH 75: CPT | Mod: GC | Performed by: HOSPITALIST

## 2019-04-16 PROCEDURE — 96375 TX/PRO/DX INJ NEW DRUG ADDON: CPT

## 2019-04-16 PROCEDURE — 96365 THER/PROPH/DIAG IV INF INIT: CPT

## 2019-04-16 PROCEDURE — 99285 EMERGENCY DEPT VISIT HI MDM: CPT

## 2019-04-16 PROCEDURE — 84443 ASSAY THYROID STIM HORMONE: CPT

## 2019-04-16 PROCEDURE — 36415 COLL VENOUS BLD VENIPUNCTURE: CPT

## 2019-04-16 PROCEDURE — 80307 DRUG TEST PRSMV CHEM ANLYZR: CPT

## 2019-04-16 PROCEDURE — 84703 CHORIONIC GONADOTROPIN ASSAY: CPT

## 2019-04-16 PROCEDURE — 700105 HCHG RX REV CODE 258: Performed by: STUDENT IN AN ORGANIZED HEALTH CARE EDUCATION/TRAINING PROGRAM

## 2019-04-16 PROCEDURE — 86901 BLOOD TYPING SEROLOGIC RH(D): CPT

## 2019-04-16 PROCEDURE — 700105 HCHG RX REV CODE 258: Performed by: EMERGENCY MEDICINE

## 2019-04-16 PROCEDURE — 80053 COMPREHEN METABOLIC PANEL: CPT

## 2019-04-16 PROCEDURE — C9113 INJ PANTOPRAZOLE SODIUM, VIA: HCPCS | Performed by: EMERGENCY MEDICINE

## 2019-04-16 PROCEDURE — 82140 ASSAY OF AMMONIA: CPT

## 2019-04-16 PROCEDURE — 85025 COMPLETE CBC W/AUTO DIFF WBC: CPT

## 2019-04-16 PROCEDURE — 96366 THER/PROPH/DIAG IV INF ADDON: CPT

## 2019-04-16 PROCEDURE — HZ2ZZZZ DETOXIFICATION SERVICES FOR SUBSTANCE ABUSE TREATMENT: ICD-10-PCS | Performed by: HOSPITALIST

## 2019-04-16 PROCEDURE — 700102 HCHG RX REV CODE 250 W/ 637 OVERRIDE(OP): Performed by: STUDENT IN AN ORGANIZED HEALTH CARE EDUCATION/TRAINING PROGRAM

## 2019-04-16 PROCEDURE — 85018 HEMOGLOBIN: CPT

## 2019-04-16 PROCEDURE — C9113 INJ PANTOPRAZOLE SODIUM, VIA: HCPCS | Performed by: STUDENT IN AN ORGANIZED HEALTH CARE EDUCATION/TRAINING PROGRAM

## 2019-04-16 PROCEDURE — 96372 THER/PROPH/DIAG INJ SC/IM: CPT

## 2019-04-16 PROCEDURE — 86900 BLOOD TYPING SEROLOGIC ABO: CPT

## 2019-04-16 PROCEDURE — 86850 RBC ANTIBODY SCREEN: CPT

## 2019-04-16 PROCEDURE — 85014 HEMATOCRIT: CPT

## 2019-04-16 PROCEDURE — A9270 NON-COVERED ITEM OR SERVICE: HCPCS | Performed by: STUDENT IN AN ORGANIZED HEALTH CARE EDUCATION/TRAINING PROGRAM

## 2019-04-16 PROCEDURE — 94760 N-INVAS EAR/PLS OXIMETRY 1: CPT

## 2019-04-16 PROCEDURE — 80048 BASIC METABOLIC PNL TOTAL CA: CPT

## 2019-04-16 PROCEDURE — 700111 HCHG RX REV CODE 636 W/ 250 OVERRIDE (IP): Performed by: EMERGENCY MEDICINE

## 2019-04-16 RX ORDER — LORAZEPAM 2 MG/1
4 TABLET ORAL
Status: DISCONTINUED | OUTPATIENT
Start: 2019-04-16 | End: 2019-04-21

## 2019-04-16 RX ORDER — LORAZEPAM 1 MG/1
1 TABLET ORAL EVERY 4 HOURS PRN
Status: DISCONTINUED | OUTPATIENT
Start: 2019-04-16 | End: 2019-04-21

## 2019-04-16 RX ORDER — ACETAMINOPHEN 325 MG/1
650 TABLET ORAL EVERY 6 HOURS PRN
Status: DISCONTINUED | OUTPATIENT
Start: 2019-04-16 | End: 2019-04-21 | Stop reason: HOSPADM

## 2019-04-16 RX ORDER — LORAZEPAM 2 MG/ML
2 INJECTION INTRAMUSCULAR ONCE
Status: COMPLETED | OUTPATIENT
Start: 2019-04-16 | End: 2019-04-16

## 2019-04-16 RX ORDER — FOLIC ACID 1 MG/1
1 TABLET ORAL DAILY
Status: DISCONTINUED | OUTPATIENT
Start: 2019-04-17 | End: 2019-04-16

## 2019-04-16 RX ORDER — MAGNESIUM SULFATE HEPTAHYDRATE 40 MG/ML
INJECTION, SOLUTION INTRAVENOUS
Status: DISPENSED
Start: 2019-04-16 | End: 2019-04-16

## 2019-04-16 RX ORDER — LORAZEPAM 2 MG/ML
1.5 INJECTION INTRAMUSCULAR
Status: DISCONTINUED | OUTPATIENT
Start: 2019-04-16 | End: 2019-04-21

## 2019-04-16 RX ORDER — THIAMINE MONONITRATE (VIT B1) 100 MG
100 TABLET ORAL DAILY
Status: DISCONTINUED | OUTPATIENT
Start: 2019-04-17 | End: 2019-04-16

## 2019-04-16 RX ORDER — THIAMINE MONONITRATE (VIT B1) 100 MG
100 TABLET ORAL DAILY
Status: DISCONTINUED | OUTPATIENT
Start: 2019-04-17 | End: 2019-04-21 | Stop reason: HOSPADM

## 2019-04-16 RX ORDER — FOLIC ACID 1 MG/1
1 TABLET ORAL DAILY
Status: DISCONTINUED | OUTPATIENT
Start: 2019-04-17 | End: 2019-04-21 | Stop reason: HOSPADM

## 2019-04-16 RX ORDER — LORAZEPAM 2 MG/ML
INJECTION INTRAMUSCULAR
Status: DISPENSED
Start: 2019-04-16 | End: 2019-04-16

## 2019-04-16 RX ORDER — PANTOPRAZOLE SODIUM 40 MG/10ML
40 INJECTION, POWDER, LYOPHILIZED, FOR SOLUTION INTRAVENOUS EVERY 12 HOURS
Status: DISCONTINUED | OUTPATIENT
Start: 2019-04-16 | End: 2019-04-19

## 2019-04-16 RX ORDER — LORAZEPAM 2 MG/ML
0.5 INJECTION INTRAMUSCULAR ONCE
Status: ACTIVE | OUTPATIENT
Start: 2019-04-16 | End: 2019-04-17

## 2019-04-16 RX ORDER — LORAZEPAM 2 MG/ML
0.5 INJECTION INTRAMUSCULAR EVERY 4 HOURS PRN
Status: DISCONTINUED | OUTPATIENT
Start: 2019-04-16 | End: 2019-04-21

## 2019-04-16 RX ORDER — LORAZEPAM 2 MG/1
2 TABLET ORAL
Status: DISCONTINUED | OUTPATIENT
Start: 2019-04-16 | End: 2019-04-21

## 2019-04-16 RX ORDER — OMEPRAZOLE 20 MG/1
20 CAPSULE, DELAYED RELEASE ORAL DAILY
Status: ON HOLD | COMMUNITY
End: 2019-04-24

## 2019-04-16 RX ORDER — DEXTROSE AND SODIUM CHLORIDE 5; .45 G/100ML; G/100ML
INJECTION, SOLUTION INTRAVENOUS CONTINUOUS
Status: DISPENSED | OUTPATIENT
Start: 2019-04-16 | End: 2019-04-18

## 2019-04-16 RX ORDER — SODIUM CHLORIDE 9 MG/ML
INJECTION, SOLUTION INTRAVENOUS CONTINUOUS
Status: DISCONTINUED | OUTPATIENT
Start: 2019-04-16 | End: 2019-04-16

## 2019-04-16 RX ORDER — LORAZEPAM 2 MG/ML
1 INJECTION INTRAMUSCULAR
Status: DISCONTINUED | OUTPATIENT
Start: 2019-04-16 | End: 2019-04-21

## 2019-04-16 RX ORDER — NICOTINE 21 MG/24HR
21 PATCH, TRANSDERMAL 24 HOURS TRANSDERMAL
Status: DISCONTINUED | OUTPATIENT
Start: 2019-04-16 | End: 2019-04-21 | Stop reason: HOSPADM

## 2019-04-16 RX ORDER — LORAZEPAM 0.5 MG/1
0.5 TABLET ORAL EVERY 4 HOURS PRN
Status: DISCONTINUED | OUTPATIENT
Start: 2019-04-16 | End: 2019-04-21

## 2019-04-16 RX ORDER — LORAZEPAM 2 MG/ML
2 INJECTION INTRAMUSCULAR
Status: DISCONTINUED | OUTPATIENT
Start: 2019-04-16 | End: 2019-04-21

## 2019-04-16 RX ADMIN — LORAZEPAM 1 MG: 2 INJECTION INTRAMUSCULAR at 13:49

## 2019-04-16 RX ADMIN — THIAMINE HYDROCHLORIDE: 100 INJECTION, SOLUTION INTRAMUSCULAR; INTRAVENOUS at 05:52

## 2019-04-16 RX ADMIN — DEXTROSE AND SODIUM CHLORIDE: 5; 450 INJECTION, SOLUTION INTRAVENOUS at 18:19

## 2019-04-16 RX ADMIN — LORAZEPAM 0.5 MG: 2 INJECTION INTRAMUSCULAR; INTRAVENOUS at 15:54

## 2019-04-16 RX ADMIN — LORAZEPAM 1.5 MG: 2 INJECTION INTRAMUSCULAR; INTRAVENOUS at 19:58

## 2019-04-16 RX ADMIN — LORAZEPAM 1.5 MG: 2 INJECTION INTRAMUSCULAR; INTRAVENOUS at 21:28

## 2019-04-16 RX ADMIN — SODIUM CHLORIDE: 9 INJECTION, SOLUTION INTRAVENOUS at 09:20

## 2019-04-16 RX ADMIN — NICOTINE 21 MG: 21 PATCH, EXTENDED RELEASE TRANSDERMAL at 09:20

## 2019-04-16 RX ADMIN — SODIUM CHLORIDE 80 MG: 9 INJECTION, SOLUTION INTRAVENOUS at 05:51

## 2019-04-16 RX ADMIN — LORAZEPAM 2 MG: 2 INJECTION INTRAMUSCULAR; INTRAVENOUS at 06:00

## 2019-04-16 RX ADMIN — LORAZEPAM 1 MG: 2 INJECTION INTRAMUSCULAR at 11:29

## 2019-04-16 RX ADMIN — LORAZEPAM 1 MG: 2 INJECTION INTRAMUSCULAR at 09:21

## 2019-04-16 RX ADMIN — LORAZEPAM 1.5 MG: 2 INJECTION INTRAMUSCULAR; INTRAVENOUS at 23:07

## 2019-04-16 RX ADMIN — PANTOPRAZOLE SODIUM 40 MG: 40 INJECTION, POWDER, LYOPHILIZED, FOR SOLUTION INTRAVENOUS at 19:31

## 2019-04-16 RX ADMIN — DEXTROSE AND SODIUM CHLORIDE: 5; 450 INJECTION, SOLUTION INTRAVENOUS at 11:29

## 2019-04-16 ASSESSMENT — LIFESTYLE VARIABLES
NAUSEA AND VOMITING: *
TREMOR: TREMOR NOT VISIBLE BUT CAN BE FELT, FINGERTIP TO FINGERTIP
HEADACHE, FULLNESS IN HEAD: VERY MILD
HEADACHE, FULLNESS IN HEAD: NOT PRESENT
TOTAL SCORE: MILD ITCHING, PINS AND NEEDLES SENSATION, BURNING OR NUMBNESS
TOTAL SCORE: MILD ITCHING, PINS AND NEEDLES SENSATION, BURNING OR NUMBNESS
AGITATION: NORMAL ACTIVITY
ANXIETY: *
ANXIETY: MILDLY ANXIOUS
TREMOR: MODERATE TREMOR WITH ARMS EXTENDED
DOES PATIENT WANT TO STOP DRINKING: NO
AUDITORY DISTURBANCES: NOT PRESENT
VISUAL DISTURBANCES: MODERATE SENSITIVITY
TREMOR: *
ORIENTATION AND CLOUDING OF SENSORIUM: ORIENTED AND CAN DO SERIAL ADDITIONS
VISUAL DISTURBANCES: MILD SENSITIVITY
ANXIETY: NO ANXIETY (AT EASE)
ANXIETY: MILDLY ANXIOUS
NAUSEA AND VOMITING: *
ORIENTATION AND CLOUDING OF SENSORIUM: ORIENTED AND CAN DO SERIAL ADDITIONS
AUDITORY DISTURBANCES: NOT PRESENT
ORIENTATION AND CLOUDING OF SENSORIUM: ORIENTED AND CAN DO SERIAL ADDITIONS
ORIENTATION AND CLOUDING OF SENSORIUM: ORIENTED AND CAN DO SERIAL ADDITIONS
HEADACHE, FULLNESS IN HEAD: NOT PRESENT
TREMOR: *
AUDITORY DISTURBANCES: NOT PRESENT
TOTAL SCORE: 11
TREMOR: *
EVER_SMOKED: YES
PAROXYSMAL SWEATS: *
NAUSEA AND VOMITING: *
PAROXYSMAL SWEATS: BARELY PERCEPTIBLE SWEATING. PALMS MOIST
AUDITORY DISTURBANCES: MODERATE HARSHNESS OR ABILITY TO FRIGHTEN
AUDITORY DISTURBANCES: MILD HARSHNESS OR ABILITY TO FRIGHTEN
ORIENTATION AND CLOUDING OF SENSORIUM: ORIENTED AND CAN DO SERIAL ADDITIONS
NAUSEA AND VOMITING: NO NAUSEA AND NO VOMITING
AUDITORY DISTURBANCES: NOT PRESENT
TOTAL SCORE: 19
TOTAL SCORE: 18
NAUSEA AND VOMITING: NO NAUSEA AND NO VOMITING
VISUAL DISTURBANCES: NOT PRESENT
ORIENTATION AND CLOUDING OF SENSORIUM: ORIENTED AND CAN DO SERIAL ADDITIONS
AUDITORY DISTURBANCES: NOT PRESENT
TOTAL SCORE: 11
AGITATION: SOMEWHAT MORE THAN NORMAL ACTIVITY
ANXIETY: MILDLY ANXIOUS
ORIENTATION AND CLOUDING OF SENSORIUM: ORIENTED AND CAN DO SERIAL ADDITIONS
TOTAL SCORE: 8
TREMOR: *
TREMOR: *
AGITATION: NORMAL ACTIVITY
AGITATION: NORMAL ACTIVITY
NAUSEA AND VOMITING: *
PAROXYSMAL SWEATS: *
EVER_SMOKED: YES
AGITATION: SOMEWHAT MORE THAN NORMAL ACTIVITY
PAROXYSMAL SWEATS: BARELY PERCEPTIBLE SWEATING. PALMS MOIST
AGITATION: NORMAL ACTIVITY
PAROXYSMAL SWEATS: BARELY PERCEPTIBLE SWEATING. PALMS MOIST
HEADACHE, FULLNESS IN HEAD: MILD
VISUAL DISTURBANCES: NOT PRESENT
ALCOHOL_USE: YES
TOTAL SCORE: 11
HEADACHE, FULLNESS IN HEAD: MODERATE
HEADACHE, FULLNESS IN HEAD: MODERATE
PAROXYSMAL SWEATS: *
TOTAL SCORE: MODERATE ITCHING, PINS AND NEEDLES SENSATION, BURNING OR NUMBNESS
VISUAL DISTURBANCES: MILD SENSITIVITY
ANXIETY: *
NAUSEA AND VOMITING: *
PAROXYSMAL SWEATS: *
HEADACHE, FULLNESS IN HEAD: MODERATE
TOTAL SCORE: 18
ANXIETY: *
AGITATION: SOMEWHAT MORE THAN NORMAL ACTIVITY
TOTAL SCORE: MILD ITCHING, PINS AND NEEDLES SENSATION, BURNING OR NUMBNESS
VISUAL DISTURBANCES: NOT PRESENT
VISUAL DISTURBANCES: MILD SENSITIVITY

## 2019-04-16 ASSESSMENT — COGNITIVE AND FUNCTIONAL STATUS - GENERAL
SUGGESTED CMS G CODE MODIFIER MOBILITY: CH
SUGGESTED CMS G CODE MODIFIER DAILY ACTIVITY: CH
MOBILITY SCORE: 24
DAILY ACTIVITIY SCORE: 24

## 2019-04-16 ASSESSMENT — COPD QUESTIONNAIRES
HAVE YOU SMOKED AT LEAST 100 CIGARETTES IN YOUR ENTIRE LIFE: YES
COPD SCREENING SCORE: 3
DO YOU EVER COUGH UP ANY MUCUS OR PHLEGM?: NO/ONLY WITH OCCASIONAL COLDS OR INFECTIONS
DURING THE PAST 4 WEEKS HOW MUCH DID YOU FEEL SHORT OF BREATH: NONE/LITTLE OF THE TIME
HAVE YOU SMOKED AT LEAST 100 CIGARETTES IN YOUR ENTIRE LIFE: YES
COPD SCREENING SCORE: 3
DURING THE PAST 4 WEEKS HOW MUCH DID YOU FEEL SHORT OF BREATH: NONE/LITTLE OF THE TIME
DO YOU EVER COUGH UP ANY MUCUS OR PHLEGM?: NO/ONLY WITH OCCASIONAL COLDS OR INFECTIONS
IN THE PAST 12 MONTHS DO YOU DO LESS THAN YOU USED TO BECAUSE OF YOUR BREATHING PROBLEMS: DISAGREE/UNSURE

## 2019-04-16 ASSESSMENT — ENCOUNTER SYMPTOMS
NAUSEA: 1
SHORTNESS OF BREATH: 0
DIARRHEA: 0
CONSTIPATION: 0
FEVER: 0
PALPITATIONS: 0
VOMITING: 1
CHILLS: 1
ABDOMINAL PAIN: 1

## 2019-04-16 ASSESSMENT — PATIENT HEALTH QUESTIONNAIRE - PHQ9
SUM OF ALL RESPONSES TO PHQ9 QUESTIONS 1 AND 2: 0
2. FEELING DOWN, DEPRESSED, IRRITABLE, OR HOPELESS: NOT AT ALL
1. LITTLE INTEREST OR PLEASURE IN DOING THINGS: NOT AT ALL

## 2019-04-16 NOTE — ED NOTES
Pt mac reaves in 4 point restraints with equal chest rise and fall, on monitor. Discussed criteria for restraint discontinuation, awaiting bed assignment.

## 2019-04-16 NOTE — ASSESSMENT & PLAN NOTE
- history of bipolar II and depression  - history of suicide attempt with overdose  - currently not on any psychiatric medications  - may consider psych consult when stable

## 2019-04-16 NOTE — NON-PROVIDER
"      Internal Medicine Medical Student Admitting History and Physical  Attending:JUDE Guzman M.D.  Note Author: Dolores Brantley, Student    Name Ashleigh Marquis     1960   Age/Sex 58 y.o. female   MRN [id number]   Code Status FULL       Chief Complaint:  Alcohol Intoxication    HPI:   58 y.o. female brought in by EMS for alcohol intoxication. Patient reports hematemesis x2. She has a long standing history of alcohol abuse. She was admitted to the ICU in 2018 for alcoholic gastritis and GI bleed. EGD done at that time showed multiple ulcers in the body and antrum of the stomach. She has been evaluated in the ED 19 times in the last 6 months for alcohol intoxication. She additionally reports diffuse abdominal pain.    HPI is limited due to patient intoxication    In the ED patient was aggressive requiring 4 point restraints and ativan. Stool was guaiac positive and blood tinged.  She was hemodynamically stable without fever, tachycardia, or significant hypotension. Hgb was 11.4 and Hct was 36.5 (6 days ago Hgb was 14.3 and Hct was 46.8)    ROS:  Review of Systems   Unable to perform ROS: Other (patient intoxicated)         Past Medical History:   Diagnosis Date   • Alcoholism (HCC)    • Anxiety    • Arthritis    • ASTHMA    • Cancer (HCC)     cervical   • Chronic low back pain    • Congestive heart failure (HCC)    • Depression    • EtOH dependence (HCC)    • Fall     alcohol related   • GERD (gastroesophageal reflux disease)    • HTN    • Hypertension    • Indigestion     GERD   • Muscle disorder    • OSTEOPOROSIS    • Other specified symptom associated with female genital organs     \"I have fibroids bad\"\"   • Psychiatric disorder    • PTSD (post-traumatic stress disorder)    • Renal disorder     Hx of stones   • Seizure (HCC)     several years ago r/t alcohol withdrawl   • Ulcer    • Vitamin D deficiency        Meds:  Prior to Admission medications    Medication Sig Start Date End Date " Taking? Authorizing Provider   omeprazole (PRILOSEC) 20 MG delayed-release capsule Take 20 mg by mouth every day.   Yes Physician Outpatient   spironolactone (ALDACTONE) 25 MG Tab Take 25 mg by mouth every day.    Physician Outpatient   clonazePAM (KLONOPIN) 0.5 MG Tab Take 0.5 mg by mouth every day.    Physician Outpatient   lisinopril (PRINIVIL) 10 MG Tab Take 1 Tab by mouth every day. 3/8/19   Marcela Thompson M.D.     Medications   LORazepam (ATIVAN) injection 0.5 mg (0 mg Intravenous Held 4/16/19 0515)   MAGNESIUM SULFATE 20 GM/500ML IV SOLN (  Canceled Entry 4/16/19 0545)   Respiratory Care per Protocol (not administered)   NS infusion ( Intravenous New Bag 4/16/19 0920)   acetaminophen (TYLENOL) tablet 650 mg (not administered)   nicotine (NICODERM) 21 MG/24HR 21 mg (21 mg Transdermal Patch Applied 4/16/19 0920)     And   nicotine polacrilex (NICORETTE) 2 MG piece 2 mg (not administered)   LORazepam (ATIVAN) tablet 0.5 mg (not administered)   LORazepam (ATIVAN) tablet 1 mg (not administered)     Or   LORazepam (ATIVAN) injection 0.5 mg (not administered)   LORazepam (ATIVAN) tablet 2 mg ( Oral See Alternative 4/16/19 0921)     Or   LORazepam (ATIVAN) injection 1 mg (1 mg Intravenous Given 4/16/19 0921)   LORazepam (ATIVAN) tablet 3 mg (not administered)     Or   LORazepam (ATIVAN) injection 1.5 mg (not administered)   LORazepam (ATIVAN) tablet 4 mg (not administered)     Or   LORazepam (ATIVAN) injection 2 mg (not administered)   thiamine tablet 100 mg (not administered)     And   multivitamin (THERAGRAN) tablet 1 Tab (not administered)     And   folic acid (FOLVITE) tablet 1 mg (not administered)   pantoprazole (PROTONIX) injection 40 mg (not administered)   detox IV 1000 mL (D5LR + magnesium 1 g + thiamine 100 mg + folic acid 1 mg) infusion ( Intravenous Stopped 4/16/19 0733)   pantoprazole (PROTONIX) 80 mg in  mL IVPB (0 mg Intravenous Stopped 4/16/19 0606)   LORazepam (ATIVAN) injection 2 mg (2 mg  Intramuscular Given 4/16/19 0600)     Administrations This Visit     detox IV 1000 mL (D5LR + magnesium 1 g + thiamine 100 mg + folic acid 1 mg) infusion     Admin Date  04/16/2019 Action  New Bag Dose   Rate  500 mL/hr Route  Intravenous Site            LORazepam (ATIVAN) injection 1 mg     Admin Date  04/16/2019 Action  Given Dose  1 mg Route  Intravenous Site            LORazepam (ATIVAN) injection 2 mg     Admin Date  04/16/2019 Action  Given Dose  2 mg Route  Intramuscular Site  Right Dorsogluteal          nicotine (NICODERM) 21 MG/24HR 21 mg     Admin Date  04/16/2019 Action  Patch Applied Dose  21 mg Route  Transdermal Site  Left Upper Arm          NS infusion     Admin Date  04/16/2019 Action  New Bag Dose   Rate  175 mL/hr Route  Intravenous Site            pantoprazole (PROTONIX) 80 mg in  mL IVPB     Admin Date  04/16/2019 Action  New Bag Dose  80 mg Rate  400 mL/hr Route  Intravenous Site                    Past Surgical History:   Procedure Laterality Date   • GASTROSCOPY-ENDO N/A 10/3/2018    Procedure: GASTROSCOPY-ENDO;  Surgeon: Ben Negro M.D.;  Location: ENDOSCOPY [facility];  Service: Gastroenterology   • CYSTOSCOPY STENT PLACEMENT  11/9/2010    Performed by ANGEL HARRIS at [facility]   • URETEROSCOPY  11/9/2010    Performed by ANGEL HARRIS at [facility]   • LASERTRIPSY  11/9/2010    Performed by ANGEL HARRIS at [facility]   • STENT REMOVAL  11/9/2010    Performed by ANGEL HARRIS at [facility]   • CYSTO STENT PLACEMNT PRE SURG  10/7/2010    Performed by ANGEL HARRIS at [facility]   • HERNIA REPAIR  1977           Medication Allergy/Sensitivities:  Allergies   Allergen Reactions   • Sulfa Drugs Vomiting   • Toradol Vomiting   • Gabapentin      Bowel incontinence     • Amoxicillin Rash     Reported by JAYLASA on arrival to ED    Pt states she takes PCN 10/3         Family History:  Family History   Problem  Relation Age of Onset   • Heart Disease Father    • Hypertension Father    • Diabetes Father    • Cancer Paternal Grandmother    • Depression Other    • Lung Disease Neg Hx    • Stroke Neg Hx        Social History:  Smoking: current every day smoker, 10 pack/years  Alcohol: Heavy alcohol use, long history of alcohol abuse, 19 ED visits in the past 6 months for alcohol intoxication  Illictis: unclear        Physical Exam     Vitals:  Vitals:    04/16/19 0700 04/16/19 0730 04/16/19 0800 04/16/19 0932   BP:    (!) 85/53   Pulse: 87 91 89 89   Resp:    16   Temp:    36.9 °C (98.5 °F)   TempSrc:    Tympanic   SpO2: 100% 100% 98% 91%   Weight:    81 kg (178 lb 9.2 oz)   Height:           Physical Exam   Constitutional: She appears well-developed. No distress.   HENT:   Head: Normocephalic and atraumatic.   Eyes: Pupils are equal, round, and reactive to light. EOM are normal.   Cardiovascular: Normal rate and regular rhythm.    Pulmonary/Chest: Effort normal and breath sounds normal.   Abdominal: Soft. She exhibits no mass. There is tenderness (mild diffuse tenderness). There is no rebound and no guarding.   Musculoskeletal: She exhibits no edema.   Skin: Skin is warm and dry.   Psychiatric: She is agitated and aggressive.         Labs:  Recent Labs      04/16/19   0557   WBC  6.3   RBC  3.62*   HEMOGLOBIN  11.4*   HEMATOCRIT  36.5*   MCV  101.7*   MCH  31.2   RDW  59.1*   PLATELETCT  151*   MPV  10.4   NEUTSPOLYS  56.40   LYMPHOCYTES  29.60   MONOCYTES  10.90   EOSINOPHILS  1.40   BASOPHILS  0.90     Recent Labs      04/16/19   0557   SODIUM  141   POTASSIUM  3.4*   CHLORIDE  109   CO2  23   GLUCOSE  78   BUN  12     Recent Labs      04/16/19   0557   ALBUMIN  3.6   TBILIRUBIN  0.2   ALKPHOSPHAT  143*   TOTPROTEIN  6.7   ALTSGPT  30   ASTSGOT  62*   CREATININE  0.66     Recent Labs      04/16/19   0557   SODIUM  141   POTASSIUM  3.4*   CHLORIDE  109   CO2  23   BUN  12   CREATININE  0.66   CALCIUM  8.9     Recent Labs       04/16/19   0557   ALTSGPT  30   ASTSGOT  62*   ALKPHOSPHAT  143*   TBILIRUBIN  0.2   LIPASE  89*   GLUCOSE  78     Recent Labs      04/16/19   0557   RBC  3.62*   HEMOGLOBIN  11.4*   HEMATOCRIT  36.5*   PLATELETCT  151*   PROTHROMBTM  12.1   APTT  25.3   INR  0.89       Imaging:      Assessment/Plan     # GI Bleed  Patient has a long history of alcohol abuse. Previously admitted to the ICU in October 2018 for GI bleeding. She was found to have multiple ulcers in the body and antrum of the stomach on EGD. She was perscribed omeprazole and carafate at time of discharge but unclear if she has been taking them. At time of admission vital signs are stable with no fever, tachycardia, or sever hypotension.  - Hgb 11.4 and Hct 36.5 at time of admission   - Hgb was 14.3 at last ED visit 4/10/18  - Stool was guaiac positive in the ED      Plan:  - Admit to tele  - Continue IVF  - NPO  - Trend H&H  - Start Protonix 40 mg IV Q12h  - Consider GI consult if she continues to bleed or becomes hemodynamically unstable      # Acute Alcohol Intoxication  # Alcohol Abuse  Patient brought in by EMS from [facility] with complaints of hematemesis x2 and was acutely intoxicated. BAL on admission was 0.21. She has a long standing history of alcohol abuse and has been seen in the ED for intoxication numerous times.    Plan:  - Admit to tele  - Ativan PRN for agitation  - Rally bag, thiamine, folate  - D5 1/2 NS while NPO  - Initiate CIWA protocol    # Alcoholic gastritis  Diagnosed on a previous admission. EGD at that time showed multiple ulcers in the antrum and body of the stomach. Patient was discharged with omeprazole and carafate but it is unclear if she ever took them. She continues to drink heavily.    Plan:  - Trend H&H  - Protonix 40 mg IV q12h    # Transaminitis  - On admission AST 62, ALT 30, Lipase 89  - Patient reports diffuse abdominal pain, could indicate early pancreatitis  - Likely due to alcohol abuse    Plan  - Continue  to monitor  - Repeat CMP in am    # Hypertension  - Patient previously diagnosed  - Prescribed lisinopril and aldactone  - BP on admission was 80s-100s/50s-60s    Plan:  - Hold lisinopril and aldactone due to hypotension    # Psychiatric Disorder  - Patient has a history of bipolar II and depression  - history of suicide attempt by overdose  - Not currently on psychiatric medication    Plan:  - Consider psych consult when patient is stable      #Core Measures   VTE PPx: SCDs  Abx: none  Lines/Tubes: peripheral IV left AC, peripheral IV right AC  Fluids D5 1/2NS 150 mL/hr  Diet: NPO  Code Status: FULL    Dispo: inpatient

## 2019-04-16 NOTE — ED TRIAGE NOTES
"Chief Complaint   Patient presents with   • Alcohol Intoxication     Found at the [facility] by EMS, claims to have alcoholic gastritis and has been drinking tonight causing her to vomit blood, pt appears heavily intoxicated and aggressive, also c/o her head burning due to recently bleaching her hair.      Pt extremely aggressive, yelling and cussing at staff in demanding food stating \"Can you give me something to fucking eat, I only fucking drank beers\" ERP responded to screaming and is currently at bedside   "

## 2019-04-16 NOTE — ASSESSMENT & PLAN NOTE
- continue home lisinopril, Aldactone  - needs Primary Care follow up and recheck out of acute setting

## 2019-04-16 NOTE — ED NOTES
"Charge RN: Responded to [facility] R/T very loud yelling and screaming. Entered room to find patient physically and verbally aggressive with bedside staff: trying to hit staff with bloody fists and yelling \"fuck you bitch fuck you.\" This Charge RN entered room to assist bedside staff with patient de-escalation. Patient verbally responded by yelling \"Fuck you bitch. Fat ass bitch. Look at your ass. That's a huge ass. Fuck you you fucking bitch\" while simultaneously giving the middle finger. This Charge RN discussed case via telephone with ERP Dr. Allen. Security called and patient placed in 4-point violent restraints and medicated as per Dr. Allen.  "

## 2019-04-16 NOTE — DISCHARGE PLANNING
Care Transition Team Assessment    Patient unable to be assessed at this time due to medical condition.  Information listed here was garnered from patients face sheet.        Information Source  Orientation : Unable to Assess    Interdisciplinary Discharge Planning  Does Admitting Nurse Feel This Could be a Complex Discharge?: No  Primary Care Physician:  (None listed)  Able to Return to Previous ADL's: Yes  Mobility Issues: No  Patient Expects to be Discharged to::  (Shelter)    Discharge Preparedness  Prior Functional Level: Ambulatory  Difficulity with ADLs: None  Difficulity with IADLs: None    Functional Assesment  Prior Functional Level: Ambulatory    Anticipated Discharge Information  Anticipated discharge disposition: Shelter  Discharge Address:  ([facility] [address])

## 2019-04-16 NOTE — PROGRESS NOTES
· 2 RN skin check complete with TETO Bruce.  · Devices in place; N/A.  · Skin assessed under devices N/A.  · Confirmed pressure ulcers found: N/A.  · New potential pressure ulcers noted: N/A.  · The following interventions in place: Encouraged to turn in bed and ambulate frequently with assistance.     Pt's skin CDI. Some generalized bruising.

## 2019-04-16 NOTE — ED NOTES
Restraints discontinued, pt educated on criteria for restraints to remain off. Pt given fresh warm blankets and repositioned. Pt on monitor, call light in reach.

## 2019-04-16 NOTE — ED NOTES
Med Rec completed per patient  Allergies reviewed  No ORAL antibiotics in last 30 days    Patient stated she was at [place] for about 15 days and they were not giving her her medications, but this is what she was taking prior to there admission

## 2019-04-16 NOTE — ASSESSMENT & PLAN NOTE
Patient reported 2 episodes before admission, not witnessed. None during hospital course. History of duodenitis and previous upper and lower GI bleed. Hemoglobin stable.    - omeprazole 40 mg daily  - Monitor CBC

## 2019-04-16 NOTE — PROGRESS NOTES
Patient arrived to unit-Tele7 in 705. Placed on cardiac monitor. , verified with monitor tech. Ambulated to the bathroom, very unsteady.  Pt bed alarm activated x2, fall protocol in place. Administered Ativan per CIWA protocol. Will continue to monitor during hourly rounding.

## 2019-04-16 NOTE — ED NOTES
"Attempted IV access, pt screaming at staff \"fuck you fat bitch, Nubia bitch, skanky bitch, fool bitch, fuck you bitch bitch bitch, does it bother you when I call you bitch? Are you getting mad yet\" , efforts to redirect patient were attempted...despite attempts pt continues to scream at staff, refusing any interventions, continues to curse and scream. pt became aggressive, and began physically fighting this RN. Charge RN responded and attempted to assist with calming patient, the pt began screaming at Charge RN, \"fuck you fat bitch, fat ass bitch. Look at your ass. That's a huge ass. Fuck you, you fucking bitch\"     ERP paged, orders for violent restraints and 2mg IM ativan received   "

## 2019-04-16 NOTE — ED NOTES
"Patient remains verbally aggressive with staff, cussing at RN, demanding that the restraints be removed \"remove my fucking ties bitch\" will continue to monitor   "

## 2019-04-16 NOTE — ASSESSMENT & PLAN NOTE
Continued alcohol withdrawal. Awake, protecting airway. No seizures. Question of psychiatric disorder(s) as causative factor in alcohol abuse.    - omeprazole 40 mg daily  - CIWA  - thiamine, folate, MV  - IVF  - seizure precautions, CTM  - Psychiatry consulted, unfortunately patient not cooperative during assessment  - cessation counseling, provide rehab resources

## 2019-04-16 NOTE — ED PROVIDER NOTES
ED Provider Note    CHIEF COMPLAINT  Chief Complaint   Patient presents with   • Alcohol Intoxication     Found at the [facility] by EMS, claims to have alcoholic gastritis and has been drinking tonight causing her to vomit blood, pt appears heavily intoxicated and aggressive, also c/o her head burning due to recently bleaching her hair.        HPI  Ashleigh Marquis is a 58 y.o. female who presents to the emergency department by ambulance from the [facility] where she requested help after vomiting blood.  Patient with long-standing history for alcohol abuse, admits to drinking 3 beers tonight.  Describes history of alcoholic gastritis and previous GI bleed requiring admission in ICU evaluation.  Patient does not have details of this workup.  Patient describes some generalized abdominal discomfort.  Denies bloody stools.  Vomiting tonight, 2 episodes bloody.  Denies fevers.    HPI is limited due to patient intoxication.  She is quite cantankerous, aggressive and easily agitated.  She is mostly redirectable during my evaluation however.    REVIEW OF SYSTEMS  See HPI for further details.  Limited due to patient intoxication.    PAST MEDICAL HISTORY   has a past medical history of Alcoholism ([facility]); Anxiety; Arthritis; ASTHMA; Cancer ([facility]) (1981); Chronic low back pain; Congestive heart failure ([facility]); Depression; EtOH dependence ([facility]); Fall; GERD (gastroesophageal reflux disease); HTN; Hypertension; Indigestion; Muscle disorder; OSTEOPOROSIS; Other specified symptom associated with female genital organs; Psychiatric disorder; PTSD (post-traumatic stress disorder); Renal disorder; Seizure ([facility]); Ulcer; and Vitamin D deficiency.    SOCIAL HISTORY  Social History     Social History Main Topics   • Smoking status: Current Every Day Smoker     Packs/day: 0.50     Years: 20.00     Types: Cigarettes   • Smokeless tobacco: Never Used      Comment: 1/2 pack per day   • Alcohol use Yes      Comment: vodka, heavy use   • Drug use:  "No   • Sexual activity: No       SURGICAL HISTORY   has a past surgical history that includes hernia repair (1977); cysto stent placemnt pre surg (10/7/2010); cystoscopy stent placement (11/9/2010); ureteroscopy (11/9/2010); lasertripsy (11/9/2010); stent removal (11/9/2010); and gastroscopy-endo (N/A, 10/3/2018).    CURRENT MEDICATIONS  Home Medications    **Home medications have not yet been reviewed for this encounter**         ALLERGIES  Allergies   Allergen Reactions   • Sulfa Drugs Vomiting   • Toradol Vomiting   • Gabapentin      Bowel incontinence     • Amoxicillin Rash     Reported by NAIDA on arrival to ED    Pt states she takes PCN 10/3       PHYSICAL EXAM  VITAL SIGNS: BP (!) 96/61   Pulse 85   Temp 36.8 °C (98.3 °F) (Temporal)   Resp 17   Ht 1.575 m (5' 2\")   Wt 81 kg (178 lb 9.2 oz)   LMP 11/02/2015   SpO2 100%   BMI 32.66 kg/m²   Pulse ox interpretation: I interpret this pulse ox as normal.  Constitutional: Alert in no apparent distress.  HENT: Normocephalic, atraumatic. Bilateral external ears normal, Nose normal.  Dry mucous membranes.    Eyes: Pupils are equal and reactive, Conjunctiva normal.  No icterus.  Neck: Normal range of motion, Supple  Lymphatic: No lymphadenopathy noted.   Cardiovascular: Regular rate and rhythm, no murmurs. Distal pulses intact.  No peripheral edema.  Thorax & Lungs: Normal breath sounds.  No wheezing/rales/ronchi. No increased work of breathing  Abdomen: Obese, soft.  Mild generalized discomfort without any rebound, guarding or peritonitis.  No palpable pulsatile mass.  Rectal: Nonthrombosed external hemorrhoids.  Small amount of stool in vault, blood tinged mucus as well.  No visualized melena.  Guaiac positive.  Skin: Warm, Dry.  Pale.  Musculoskeletal: Good range of motion in all major joints. No major deformities noted.   Neurologic: Alert and oriented x3.  Moves 4 extremities spontaneously, ambulates independently.  Psychiatric: Intoxicated, aggressive, " yelling but redirectable.  Denies suicidal ideation.      DIAGNOSTIC STUDIES / PROCEDURES    LABS  Results for orders placed or performed during the hospital encounter of 04/16/19   CBC WITH DIFFERENTIAL   Result Value Ref Range    WBC 6.3 4.8 - 10.8 K/uL    RBC 3.62 (L) 4.20 - 5.40 M/uL    Hemoglobin 11.4 (L) 12.0 - 16.0 g/dL    Hematocrit 36.5 (L) 37.0 - 47.0 %    .7 (H) 81.4 - 97.8 fL    MCH 31.2 27.0 - 33.0 pg    MCHC 30.7 (L) 33.6 - 35.0 g/dL    RDW 59.1 (H) 35.9 - 50.0 fL    Platelet Count 151 (L) 164 - 446 K/uL    MPV 10.4 9.0 - 12.9 fL    Neutrophils-Polys 56.40 44.00 - 72.00 %    Lymphocytes 29.60 22.00 - 41.00 %    Monocytes 10.90 0.00 - 13.40 %    Eosinophils 1.40 0.00 - 6.90 %    Basophils 0.90 0.00 - 1.80 %    Immature Granulocytes 0.80 0.00 - 0.90 %    Nucleated RBC 0.00 /100 WBC    Neutrophils (Absolute) 3.56 2.00 - 7.15 K/uL    Lymphs (Absolute) 1.87 1.00 - 4.80 K/uL    Monos (Absolute) 0.69 0.00 - 0.85 K/uL    Eos (Absolute) 0.09 0.00 - 0.51 K/uL    Baso (Absolute) 0.06 0.00 - 0.12 K/uL    Immature Granulocytes (abs) 0.05 0.00 - 0.11 K/uL    NRBC (Absolute) 0.00 K/uL   COMP METABOLIC PANEL   Result Value Ref Range    Sodium 141 135 - 145 mmol/L    Potassium 3.4 (L) 3.6 - 5.5 mmol/L    Chloride 109 96 - 112 mmol/L    Co2 23 20 - 33 mmol/L    Anion Gap 9.0 0.0 - 11.9    Glucose 78 65 - 99 mg/dL    Bun 12 8 - 22 mg/dL    Creatinine 0.66 0.50 - 1.40 mg/dL    Calcium 8.9 8.5 - 10.5 mg/dL    AST(SGOT) 62 (H) 12 - 45 U/L    ALT(SGPT) 30 2 - 50 U/L    Alkaline Phosphatase 143 (H) 30 - 99 U/L    Total Bilirubin 0.2 0.1 - 1.5 mg/dL    Albumin 3.6 3.2 - 4.9 g/dL    Total Protein 6.7 6.0 - 8.2 g/dL    Globulin 3.1 1.9 - 3.5 g/dL    A-G Ratio 1.2 g/dL   LIPASE   Result Value Ref Range    Lipase 89 (H) 11 - 82 U/L   AMMONIA   Result Value Ref Range    Ammonia 48 (H) 11 - 45 umol/L   APTT   Result Value Ref Range    APTT 25.3 24.7 - 36.0 sec   PROTHROMBIN TIME (INR)   Result Value Ref Range    PT 12.1 12.0  - 14.6 sec    INR 0.89 0.87 - 1.13   DIAGNOSTIC ALCOHOL   Result Value Ref Range    Diagnostic Alcohol 0.21 (H) 0.00 g/dL   ESTIMATED GFR   Result Value Ref Range    GFR If African American >60 >60 mL/min/1.73 m 2    GFR If Non African American >60 >60 mL/min/1.73 m 2       COURSE & MEDICAL DECISION MAKING  Nursing notes and vital signs were reviewed. (See chart for details)  The patients  records were reviewed, history was obtained from the patient;     Medical record review: Patient has been seen at this facility for suicidal ideation and/or alcohol intoxication more than 10 times in the last 6 months since previous admission.  In October 2018 she was admitted to this facility for hematemesis and upper GI bleed.  Initially in the intensive care unit for hypotension however this improved with IV fluids.  No blood transfusion necessary.  She was seen by Dr. Negro for EGD which showed multiple superficial ulcers in the body and antrum of the stomach that were too numerous to count.  Initially given IV Protonix and octreotide, patient was started on omeprazole twice daily and Carafate.  Tolerated diet before discharge.  Counseled extensively on importance of alcohol cessation.    Evaluation is concerning for upper GI bleed given history hematemesis as well as my rectal evaluation with blood-tinged stool, guaiac positive.  Patient with known alcoholic gastritis and numerous gastric ulcers.  Patient continues to drink alcohol.  Blood work is concerning for developing pancreatitis which may be contributing to patient's abdominal discomfort as well.  Patient's ammonia level slightly elevated as well, she remains agitated, aggressive and uncooperative, suspect secondary to the alcohol intoxication although there may be some level of encephalopathy here as well.  Patient received detox bag as well as Ativan.  Remains hemodynamically stable without fever, tachycardia or significant hypotension.  She was never hypoxic.   My exam not suggestive of SBP.  No recurrent hematemesis here, hemoglobin 11.4, hematocrit 36.5 (previously 6 days ago 14.3, 46.8).  There is some evidence for blood loss, although no indication for blood transfusion at this time.  Patient will be admitted to the hospital for further evaluation and treatment.    7:06 AM [facility] paged.     7:15 AM [facility] IM is aware of the patient agreeable to admission.  Patient reevaluated bedside, sleeping but arousable.  More compliant at this time, will decrease to 2 restraints and attempt to get patient out of restraints if she remains cooperative.    FINAL IMPRESSION  (R10.84) Generalized abdominal pain  (K92.2) Gastrointestinal hemorrhage, unspecified gastrointestinal hemorrhage type  (F10.929) Alcoholic intoxication with complication (HCC)  (K85.20) Alcohol-induced acute pancreatitis, unspecified complication status  (K72.90) Hepatic encephalopathy (HCC)      Electronically signed by: Ashleigh Allen, 4/16/2019 4:59 AM      This dictation was created using voice recognition software. The accuracy of the dictation is limited to the abilities of the software. I expect there may be some errors of grammar and possibly content. The nursing notes were reviewed and certain aspects of this information were incorporated into this note.

## 2019-04-17 LAB
ALBUMIN SERPL BCP-MCNC: 3.1 G/DL (ref 3.2–4.9)
ALBUMIN/GLOB SERPL: 1.1 G/DL
ALP SERPL-CCNC: 135 U/L (ref 30–99)
ALT SERPL-CCNC: 28 U/L (ref 2–50)
ANION GAP SERPL CALC-SCNC: 8 MMOL/L (ref 0–11.9)
AST SERPL-CCNC: 53 U/L (ref 12–45)
BASOPHILS # BLD AUTO: 1.1 % (ref 0–1.8)
BASOPHILS # BLD: 0.06 K/UL (ref 0–0.12)
BILIRUB SERPL-MCNC: 0.3 MG/DL (ref 0.1–1.5)
BUN SERPL-MCNC: 10 MG/DL (ref 8–22)
CALCIUM SERPL-MCNC: 8.3 MG/DL (ref 8.5–10.5)
CHLORIDE SERPL-SCNC: 110 MMOL/L (ref 96–112)
CO2 SERPL-SCNC: 22 MMOL/L (ref 20–33)
CREAT SERPL-MCNC: 0.66 MG/DL (ref 0.5–1.4)
EOSINOPHIL # BLD AUTO: 0.1 K/UL (ref 0–0.51)
EOSINOPHIL NFR BLD: 1.9 % (ref 0–6.9)
ERYTHROCYTE [DISTWIDTH] IN BLOOD BY AUTOMATED COUNT: 58.4 FL (ref 35.9–50)
GLOBULIN SER CALC-MCNC: 2.7 G/DL (ref 1.9–3.5)
GLUCOSE SERPL-MCNC: 108 MG/DL (ref 65–99)
HCT VFR BLD AUTO: 36.7 % (ref 37–47)
HGB BLD-MCNC: 12 G/DL (ref 12–16)
IMM GRANULOCYTES # BLD AUTO: 0.02 K/UL (ref 0–0.11)
IMM GRANULOCYTES NFR BLD AUTO: 0.4 % (ref 0–0.9)
LYMPHOCYTES # BLD AUTO: 1.74 K/UL (ref 1–4.8)
LYMPHOCYTES NFR BLD: 32.8 % (ref 22–41)
MCH RBC QN AUTO: 32.3 PG (ref 27–33)
MCHC RBC AUTO-ENTMCNC: 32.7 G/DL (ref 33.6–35)
MCV RBC AUTO: 98.9 FL (ref 81.4–97.8)
MONOCYTES # BLD AUTO: 0.76 K/UL (ref 0–0.85)
MONOCYTES NFR BLD AUTO: 14.3 % (ref 0–13.4)
NEUTROPHILS # BLD AUTO: 2.62 K/UL (ref 2–7.15)
NEUTROPHILS NFR BLD: 49.5 % (ref 44–72)
NRBC # BLD AUTO: 0 K/UL
NRBC BLD-RTO: 0 /100 WBC
PLATELET # BLD AUTO: 141 K/UL (ref 164–446)
PMV BLD AUTO: 11.4 FL (ref 9–12.9)
POTASSIUM SERPL-SCNC: 3.7 MMOL/L (ref 3.6–5.5)
PROT SERPL-MCNC: 5.8 G/DL (ref 6–8.2)
RBC # BLD AUTO: 3.71 M/UL (ref 4.2–5.4)
SODIUM SERPL-SCNC: 140 MMOL/L (ref 135–145)
WBC # BLD AUTO: 5.3 K/UL (ref 4.8–10.8)

## 2019-04-17 PROCEDURE — 700105 HCHG RX REV CODE 258: Performed by: STUDENT IN AN ORGANIZED HEALTH CARE EDUCATION/TRAINING PROGRAM

## 2019-04-17 PROCEDURE — 700102 HCHG RX REV CODE 250 W/ 637 OVERRIDE(OP): Performed by: STUDENT IN AN ORGANIZED HEALTH CARE EDUCATION/TRAINING PROGRAM

## 2019-04-17 PROCEDURE — 700111 HCHG RX REV CODE 636 W/ 250 OVERRIDE (IP): Performed by: STUDENT IN AN ORGANIZED HEALTH CARE EDUCATION/TRAINING PROGRAM

## 2019-04-17 PROCEDURE — 85025 COMPLETE CBC W/AUTO DIFF WBC: CPT

## 2019-04-17 PROCEDURE — 770020 HCHG ROOM/CARE - TELE (206)

## 2019-04-17 PROCEDURE — C9113 INJ PANTOPRAZOLE SODIUM, VIA: HCPCS | Performed by: STUDENT IN AN ORGANIZED HEALTH CARE EDUCATION/TRAINING PROGRAM

## 2019-04-17 PROCEDURE — 99232 SBSQ HOSP IP/OBS MODERATE 35: CPT | Mod: GC | Performed by: HOSPITALIST

## 2019-04-17 PROCEDURE — 80053 COMPREHEN METABOLIC PANEL: CPT

## 2019-04-17 PROCEDURE — 36415 COLL VENOUS BLD VENIPUNCTURE: CPT

## 2019-04-17 PROCEDURE — A9270 NON-COVERED ITEM OR SERVICE: HCPCS | Performed by: STUDENT IN AN ORGANIZED HEALTH CARE EDUCATION/TRAINING PROGRAM

## 2019-04-17 RX ORDER — SPIRONOLACTONE 25 MG/1
25 TABLET ORAL DAILY
Status: DISCONTINUED | OUTPATIENT
Start: 2019-04-17 | End: 2019-04-21 | Stop reason: HOSPADM

## 2019-04-17 RX ORDER — HEPARIN SODIUM 5000 [USP'U]/ML
5000 INJECTION, SOLUTION INTRAVENOUS; SUBCUTANEOUS EVERY 8 HOURS
Status: DISCONTINUED | OUTPATIENT
Start: 2019-04-17 | End: 2019-04-17

## 2019-04-17 RX ORDER — LISINOPRIL 10 MG/1
10 TABLET ORAL DAILY
Status: DISCONTINUED | OUTPATIENT
Start: 2019-04-17 | End: 2019-04-21 | Stop reason: HOSPADM

## 2019-04-17 RX ADMIN — DEXTROSE AND SODIUM CHLORIDE: 5; 450 INJECTION, SOLUTION INTRAVENOUS at 01:28

## 2019-04-17 RX ADMIN — FAMOTIDINE 20 MG: 10 INJECTION INTRAVENOUS at 17:28

## 2019-04-17 RX ADMIN — DEXTROSE AND SODIUM CHLORIDE: 5; 450 INJECTION, SOLUTION INTRAVENOUS at 17:33

## 2019-04-17 RX ADMIN — LORAZEPAM 1 MG: 2 INJECTION INTRAMUSCULAR at 21:00

## 2019-04-17 RX ADMIN — THERA TABS 1 TABLET: TAB at 06:03

## 2019-04-17 RX ADMIN — NICOTINE 21 MG: 21 PATCH, EXTENDED RELEASE TRANSDERMAL at 06:00

## 2019-04-17 RX ADMIN — LORAZEPAM 3 MG: 1 TABLET ORAL at 08:01

## 2019-04-17 RX ADMIN — PANTOPRAZOLE SODIUM 40 MG: 40 INJECTION, POWDER, LYOPHILIZED, FOR SOLUTION INTRAVENOUS at 17:29

## 2019-04-17 RX ADMIN — SPIRONOLACTONE 25 MG: 25 TABLET ORAL at 14:38

## 2019-04-17 RX ADMIN — LORAZEPAM 1 MG: 2 INJECTION INTRAMUSCULAR at 03:56

## 2019-04-17 RX ADMIN — LORAZEPAM 2 MG: 2 TABLET ORAL at 17:28

## 2019-04-17 RX ADMIN — PANTOPRAZOLE SODIUM 40 MG: 40 INJECTION, POWDER, LYOPHILIZED, FOR SOLUTION INTRAVENOUS at 06:03

## 2019-04-17 RX ADMIN — LORAZEPAM 1 MG: 2 INJECTION INTRAMUSCULAR at 23:07

## 2019-04-17 RX ADMIN — Medication 100 MG: at 06:03

## 2019-04-17 RX ADMIN — LORAZEPAM 1 MG: 2 INJECTION INTRAMUSCULAR at 06:03

## 2019-04-17 RX ADMIN — LORAZEPAM 2 MG: 2 TABLET ORAL at 14:38

## 2019-04-17 RX ADMIN — LORAZEPAM 1 MG: 1 TABLET ORAL at 01:32

## 2019-04-17 RX ADMIN — LORAZEPAM 0.5 MG: 0.5 TABLET ORAL at 12:23

## 2019-04-17 RX ADMIN — LISINOPRIL 10 MG: 10 TABLET ORAL at 14:39

## 2019-04-17 RX ADMIN — FOLIC ACID 1 MG: 1 TABLET ORAL at 06:03

## 2019-04-17 RX ADMIN — DEXTROSE AND SODIUM CHLORIDE: 5; 450 INJECTION, SOLUTION INTRAVENOUS at 08:07

## 2019-04-17 ASSESSMENT — LIFESTYLE VARIABLES
TOTAL SCORE: MILD ITCHING, PINS AND NEEDLES SENSATION, BURNING OR NUMBNESS
TREMOR: *
TREMOR: *
ANXIETY: MILDLY ANXIOUS
TOTAL SCORE: 12
DOES PATIENT WANT TO STOP DRINKING: NO
AGITATION: SOMEWHAT MORE THAN NORMAL ACTIVITY
HEADACHE, FULLNESS IN HEAD: MODERATE
AUDITORY DISTURBANCES: NOT PRESENT
TREMOR: TREMOR NOT VISIBLE BUT CAN BE FELT, FINGERTIP TO FINGERTIP
HAVE YOU EVER FELT YOU SHOULD CUT DOWN ON YOUR DRINKING: YES
ORIENTATION AND CLOUDING OF SENSORIUM: ORIENTED AND CAN DO SERIAL ADDITIONS
AGITATION: SOMEWHAT MORE THAN NORMAL ACTIVITY
ORIENTATION AND CLOUDING OF SENSORIUM: ORIENTED AND CAN DO SERIAL ADDITIONS
TOTAL SCORE: 11
ORIENTATION AND CLOUDING OF SENSORIUM: ORIENTED AND CAN DO SERIAL ADDITIONS
EVER FELT BAD OR GUILTY ABOUT YOUR DRINKING: YES
PAROXYSMAL SWEATS: BARELY PERCEPTIBLE SWEATING. PALMS MOIST
NAUSEA AND VOMITING: NO NAUSEA AND NO VOMITING
HEADACHE, FULLNESS IN HEAD: MODERATE
TOTAL SCORE: VERY MILD ITCHING, PINS AND NEEDLES SENSATION, BURNING OR NUMBNESS
VISUAL DISTURBANCES: NOT PRESENT
HEADACHE, FULLNESS IN HEAD: MODERATE
AGITATION: NORMAL ACTIVITY
NAUSEA AND VOMITING: NO NAUSEA AND NO VOMITING
NAUSEA AND VOMITING: NO NAUSEA AND NO VOMITING
CONSUMPTION TOTAL: POSITIVE
EVER HAD A DRINK FIRST THING IN THE MORNING TO STEADY YOUR NERVES TO GET RID OF A HANGOVER: YES
TREMOR: *
TOTAL SCORE: VERY MILD ITCHING, PINS AND NEEDLES SENSATION, BURNING OR NUMBNESS
AUDITORY DISTURBANCES: NOT PRESENT
AVERAGE NUMBER OF DAYS PER WEEK YOU HAVE A DRINK CONTAINING ALCOHOL: 7
AGITATION: SOMEWHAT MORE THAN NORMAL ACTIVITY
VISUAL DISTURBANCES: NOT PRESENT
TOTAL SCORE: MILD ITCHING, PINS AND NEEDLES SENSATION, BURNING OR NUMBNESS
ANXIETY: *
TOTAL SCORE: MILD ITCHING, PINS AND NEEDLES SENSATION, BURNING OR NUMBNESS
TOTAL SCORE: VERY MILD ITCHING, PINS AND NEEDLES SENSATION, BURNING OR NUMBNESS
AUDITORY DISTURBANCES: NOT PRESENT
HEADACHE, FULLNESS IN HEAD: MODERATE
VISUAL DISTURBANCES: NOT PRESENT
AGITATION: SOMEWHAT MORE THAN NORMAL ACTIVITY
VISUAL DISTURBANCES: NOT PRESENT
ANXIETY: *
TOTAL SCORE: 14
AGITATION: NORMAL ACTIVITY
PAROXYSMAL SWEATS: *
HEADACHE, FULLNESS IN HEAD: MODERATE
ON A TYPICAL DAY WHEN YOU DRINK ALCOHOL HOW MANY DRINKS DO YOU HAVE: 8
TOTAL SCORE: VERY MILD ITCHING, PINS AND NEEDLES SENSATION, BURNING OR NUMBNESS
TOTAL SCORE: 10
TOTAL SCORE: 13
VISUAL DISTURBANCES: VERY MILD SENSITIVITY
TOTAL SCORE: MILD ITCHING, PINS AND NEEDLES SENSATION, BURNING OR NUMBNESS
ORIENTATION AND CLOUDING OF SENSORIUM: ORIENTED AND CAN DO SERIAL ADDITIONS
ANXIETY: *
AUDITORY DISTURBANCES: NOT PRESENT
HOW MANY TIMES IN THE PAST YEAR HAVE YOU HAD 5 OR MORE DRINKS IN A DAY: 20
ANXIETY: *
VISUAL DISTURBANCES: NOT PRESENT
PAROXYSMAL SWEATS: BARELY PERCEPTIBLE SWEATING. PALMS MOIST
TOTAL SCORE: 12
AUDITORY DISTURBANCES: NOT PRESENT
TREMOR: *
HEADACHE, FULLNESS IN HEAD: MODERATE
PAROXYSMAL SWEATS: *
TOTAL SCORE: 9
PAROXYSMAL SWEATS: BARELY PERCEPTIBLE SWEATING. PALMS MOIST
TREMOR: *
TREMOR: TREMOR NOT VISIBLE BUT CAN BE FELT, FINGERTIP TO FINGERTIP
ORIENTATION AND CLOUDING OF SENSORIUM: ORIENTED AND CAN DO SERIAL ADDITIONS
TOTAL SCORE: 5
NAUSEA AND VOMITING: MILD NAUSEA WITH NO VOMITING
HAVE PEOPLE ANNOYED YOU BY CRITICIZING YOUR DRINKING: NO
VISUAL DISTURBANCES: NOT PRESENT
PAROXYSMAL SWEATS: BARELY PERCEPTIBLE SWEATING. PALMS MOIST
ORIENTATION AND CLOUDING OF SENSORIUM: ORIENTED AND CAN DO SERIAL ADDITIONS
AUDITORY DISTURBANCES: NOT PRESENT
TREMOR: *
HEADACHE, FULLNESS IN HEAD: MILD
TOTAL SCORE: 7
HEADACHE, FULLNESS IN HEAD: MODERATE
HEADACHE, FULLNESS IN HEAD: MODERATE
AGITATION: NORMAL ACTIVITY
TOTAL SCORE: VERY MILD ITCHING, PINS AND NEEDLES SENSATION, BURNING OR NUMBNESS
ORIENTATION AND CLOUDING OF SENSORIUM: ORIENTED AND CAN DO SERIAL ADDITIONS
AGITATION: NORMAL ACTIVITY
AGITATION: SOMEWHAT MORE THAN NORMAL ACTIVITY
NAUSEA AND VOMITING: NO NAUSEA AND NO VOMITING
TREMOR: *
VISUAL DISTURBANCES: NOT PRESENT
VISUAL DISTURBANCES: NOT PRESENT
PAROXYSMAL SWEATS: BARELY PERCEPTIBLE SWEATING. PALMS MOIST
ANXIETY: MILDLY ANXIOUS
AUDITORY DISTURBANCES: NOT PRESENT
AUDITORY DISTURBANCES: NOT PRESENT
ANXIETY: *
PAROXYSMAL SWEATS: *
VISUAL DISTURBANCES: NOT PRESENT
HEADACHE, FULLNESS IN HEAD: VERY MILD
ORIENTATION AND CLOUDING OF SENSORIUM: ORIENTED AND CAN DO SERIAL ADDITIONS
AUDITORY DISTURBANCES: NOT PRESENT
NAUSEA AND VOMITING: MILD NAUSEA WITH NO VOMITING
TOTAL SCORE: 3
NAUSEA AND VOMITING: MILD NAUSEA WITH NO VOMITING
TOTAL SCORE: 12
ANXIETY: *
ANXIETY: *
ALCOHOL_USE: YES
AUDITORY DISTURBANCES: NOT PRESENT
ANXIETY: *
TOTAL SCORE: 3
AGITATION: NORMAL ACTIVITY
TOTAL SCORE: 3
PAROXYSMAL SWEATS: *
TREMOR: *
ORIENTATION AND CLOUDING OF SENSORIUM: ORIENTED AND CAN DO SERIAL ADDITIONS
NAUSEA AND VOMITING: NO NAUSEA AND NO VOMITING
ORIENTATION AND CLOUDING OF SENSORIUM: ORIENTED AND CAN DO SERIAL ADDITIONS
PAROXYSMAL SWEATS: *

## 2019-04-17 ASSESSMENT — ENCOUNTER SYMPTOMS
ABDOMINAL PAIN: 1
TINGLING: 0
HEARTBURN: 0
SENSORY CHANGE: 0
SHORTNESS OF BREATH: 0
SORE THROAT: 0
COUGH: 0
FOCAL WEAKNESS: 0
HEADACHES: 1
NAUSEA: 0
VOMITING: 0
FEVER: 0
PALPITATIONS: 0
SEIZURES: 0
HALLUCINATIONS: 0
BLURRED VISION: 1
TREMORS: 1
DIZZINESS: 0
CHILLS: 0
CONSTIPATION: 0
DIARRHEA: 0

## 2019-04-17 NOTE — NON-PROVIDER
"       Internal Medicine Medical Student Note  Attending: JUDE Guzman M.D.  Note Author: Dolores Brantley, Student    Name Ashleigh Marquis     1960   Age/Sex 58 y.o. female   MRN [id number]   Code Status FULL             Reason for interval visit  (Principal Problem)   GI Bleed, alcohol intoxication    Interval Problem Daily Status Update  (problem status, last 24 hours, new history, new data )   No acute events overnight. No further episodes of hematemesis, denies nausea. Was incontinent of urine once overnight because she could not make it to the bathroom fast enough.    CIWA scores have been 8-19 and she has been medicated with ativan x12. She complains of tremors, headache and \"foggy\" vision this morning.    Hgb stable at 12 this morning, Hct 36.7    Review of Systems   Constitutional: Negative for chills and fever.   HENT: Negative for congestion and sore throat.    Eyes: Positive for blurred vision (\"foggy\").   Respiratory: Negative for cough and shortness of breath.    Cardiovascular: Negative for chest pain and leg swelling.   Gastrointestinal: Positive for abdominal pain (sharp). Negative for constipation, diarrhea, heartburn, nausea and vomiting.   Genitourinary: Positive for urgency. Negative for dysuria.   Neurological: Positive for tremors and headaches. Negative for dizziness.       Physical Exam     Vitals:  Vitals:    19 2000 19 0000 19 0400 19 0740   BP: 150/77 155/89 159/97 160/95   Pulse: 95 88 84 84   Resp:    Temp: 36.8 °C (98.3 °F) 36.7 °C (98.1 °F) 36.7 °C (98.1 °F) 36.1 °C (96.9 °F)   TempSrc: Temporal Temporal Temporal Temporal   SpO2: 94% 95% 96% 96%   Weight: 82.4 kg (181 lb 10.5 oz)      Height:             Physical Exam:  Physical Exam   Constitutional: She is oriented to person, place, and time and well-developed, well-nourished, and in no distress.   HENT:   Head: Normocephalic and atraumatic.   Eyes: Pupils are equal, round, and " reactive to light. Conjunctivae and EOM are normal.   Cardiovascular: Normal rate, regular rhythm and normal heart sounds.    Pulmonary/Chest: Effort normal and breath sounds normal.   Abdominal: Soft. Bowel sounds are normal. There is tenderness (mild diffuse tenderness to palpation ).   Musculoskeletal: She exhibits no edema.   Neurological: She is alert and oriented to person, place, and time. No cranial nerve deficit.   Skin: Skin is warm and dry. No pallor.     Recent Labs      04/16/19 0557 04/16/19   1511 04/17/19   0332   WBC  6.3   --   5.3   RBC  3.62*   --   3.71*   HEMOGLOBIN  11.4*  11.0*  12.0   HEMATOCRIT  36.5*  34.7*  36.7*   MCV  101.7*   --   98.9*   MCH  31.2   --   32.3   RDW  59.1*   --   58.4*   PLATELETCT  151*   --   141*   MPV  10.4   --   11.4   NEUTSPOLYS  56.40   --   49.50   LYMPHOCYTES  29.60   --   32.80   MONOCYTES  10.90   --   14.30*   EOSINOPHILS  1.40   --   1.90   BASOPHILS  0.90   --   1.10     Recent Labs      04/16/19 0557 04/16/19 1759 04/17/19   0332   SODIUM  141  138  140   POTASSIUM  3.4*  3.8  3.7   CHLORIDE  109  107  110   CO2  23  23  22   GLUCOSE  78  109*  108*   BUN  12  10  10     Recent Labs      04/16/19 0557 04/16/19 1759 04/17/19   0332   ALBUMIN  3.6   --   3.1*   TBILIRUBIN  0.2   --   0.3   ALKPHOSPHAT  143*   --   135*   TOTPROTEIN  6.7   --   5.8*   ALTSGPT  30   --   28   ASTSGOT  62*   --   53*   CREATININE  0.66  0.64  0.66     Recent Labs      04/16/19 0557  04/17/19   0332   ASTSGOT  62*  53*   ALTSGPT  30  28   TBILIRUBIN  0.2  0.3   ALKPHOSPHAT  143*  135*   GLOBULIN  3.1  2.7   INR  0.89   --    AMMONIA  48*   --      Recent Labs      04/16/19 0557   APTT  25.3   INR  0.89     Recent Labs      04/16/19   0557  04/16/19 1759 04/17/19   0332   SODIUM  141  138  140   POTASSIUM  3.4*  3.8  3.7   CHLORIDE  109  107  110   CO2  23  23  22   GLUCOSE  78  109*  108*   BUN  12  10  10   CREATININE  0.66  0.64  0.66              Intake/Output Summary (Last 24 hours) at 04/17/19 1242  Last data filed at 04/16/19 2000   Gross per 24 hour   Intake             1040 ml   Output                0 ml   Net             1040 ml           Assessment/Plan     # GI Bleed  Patient has a long history of alcohol abuse. Previously admitted to the ICU in October 2018 for GI bleeding. She was found to have multiple ulcers in the body and antrum of the stomach on EGD. She was perscribed omeprazole and carafate at time of discharge but unclear if she has been taking them. At time of admission vital signs are stable with no fever, tachycardia, or sever hypotension.  - Hgb 11.4 and Hct 36.5 at time of admission              - Hgb was 14.3 at last ED visit 4/10/18  - Stool was guaiac positive in the ED  - Hgb stable now 12. Hct 36.7      Plan:  - Continue IVF D5 1/2NS  - Mechanical soft diet   - Trend H&H  - Continue Protonix 40 mg IV Q12h  - Consider GI consult if she continues to bleed or becomes hemodynamically unstable        # Acute Alcohol Intoxication  # Alcohol Abuse  Patient brought in by EMS from [facility] with complaints of hematemesis x2 and was acutely intoxicated. BAL on admission was 0.21. She has a long standing history of alcohol abuse and has been seen in the ED for intoxication numerous times.  - CIWA scores have been 8-19 she has been medicated with ativan x12 since admission     Plan:  - Admit to tele  - Ativan PRN for agitation  - Continue thiamine, folate  - D5 1/2 NS   - Continue CIWA protocol     # Alcoholic gastritis  Diagnosed on a previous admission. EGD at that time showed multiple ulcers in the antrum and body of the stomach. Patient was discharged with omeprazole and carafate but it is unclear if she ever took them. She continues to drink heavily.     Plan:  - Trend H&H  - Protonix 40 mg IV q12h     # Transaminitis  - On admission AST 62, ALT 30, Lipase 89  - Patient reports diffuse abdominal pain, could indicate early  pancreatitis  - Likely due to alcohol abuse  - Improving AST 53, ALT 28, [id number]     Plan  - Continue to monitor  - Repeat CMP in am     # Hypertension  - Patient previously diagnosed  - Prescribed lisinopril and aldactone  - BP on admission was 80s-100s/50s-60s     Plan:  - Hold lisinopril and aldactone due to hypotension     # History of Bipolar II  # History of Suicide attempt  - Patient has a history of bipolar II and depression  - history of suicide attempt by overdose  - Not currently on psychiatric medication     Plan:  - Psych consult to evaluate the need for psychiatric medication        PROBLEM LIST:  Active Hospital Problems    Diagnosis   • Hematemesis [K92.0]     Priority: High   • Acute alcohol intoxication (HCC) [F10.929]     Priority: High   • Alcohol abuse [F10.10]     Priority: High   • Heme positive stool [R19.5]     Priority: Medium   • Duodenitis without bleeding [K29.80]     Priority: Medium   • Psychiatric disorder [F99]     Priority: Low   • Transaminitis [R74.0]     Priority: Low   • Hypertension [I10]     Priority: Low       #Core Measures   VTE PPx: SCDs  Abx: none  Lines/Tubes: peripheral IV left AC, peripheral IV right AC  Fluids D5 1/2NS 150 mL/hr  Diet: Mechanical Soft diet as tolerated  Code Status: FULL    Dispo: Inpatient

## 2019-04-17 NOTE — ASSESSMENT & PLAN NOTE
Denies suicidal ideation.    - Psychiatry consulted, unfortunately patient not cooperative during assessment

## 2019-04-17 NOTE — ASSESSMENT & PLAN NOTE
- history of suicide attempt via overdose  - only on clonopin 0.5 at home  - pending psych evaluation

## 2019-04-17 NOTE — PROGRESS NOTES
Internal Medicine Interval Note  Note Author: Vic Moore M.D.     Name Ashleigh Marquis     1960   Age/Sex 58 y.o. female   MRN [id number]   Code Status Full     After 5PM or if no immediate response to page, please call for cross-coverage  Attending/Team: Thomas/Edwin See Patient List for primary contact information  Call [phone] to page    1st Call - Day Intern (R1):   Teresa 2nd Call - Day Sr. Resident (R2/R3):   Jessica         Reason for interval visit  (Principal Problem)     Alcohol withdrawal    Interval Problem Daily Status Update  (24 hours, problem oriented, brief subjective history, new lab/imaging data pertinent to that problem)     -Patient has gone into withdrawal, CIWA scores 8-19  -Has received 12 mg Ativan since admission  -Vitals remains stable  -Hemoglobin has remained stable, no further hematemesis  -AST and ALT are trending down  -Patient is diaphoretic this a.m. with tremors and headache    -Patient has a history of depression, bipolar 2 disorder, and PTSD.  Not currently on psychiatric medications.  Will get psych consult.    Review of Systems   Constitutional: Negative for chills and fever.   Cardiovascular: Negative for chest pain and palpitations.   Gastrointestinal: Positive for abdominal pain. Negative for constipation, diarrhea, nausea and vomiting.   Genitourinary: Positive for urgency.        Urinary incontinence   Skin: Negative for itching and rash.   Neurological: Positive for tremors and headaches. Negative for tingling, sensory change, focal weakness and seizures.   Psychiatric/Behavioral: Negative for hallucinations.   All other systems reviewed and are negative.      Disposition/Barriers to discharge:   EtOH w/drawal    Consultants/Specialty  Psychiatry     PCP: Pcp Pt States None      Quality Measures  Quality-Core Measures   Reviewed items::  Labs reviewed and Medications reviewed  Velásquez catheter::  No Velásquez  DVT prophylaxis - mechanical:   SCDs      Physical Exam       Vitals:    04/17/19 0000 04/17/19 0400 04/17/19 0740 04/17/19 1220   BP: 155/89 159/97 160/95 (!) 167/102   Pulse: 88 84 84 69   Resp: 17 17 18 18   Temp: 36.7 °C (98.1 °F) 36.7 °C (98.1 °F) 36.1 °C (96.9 °F) 36.2 °C (97.2 °F)   TempSrc: Temporal Temporal Temporal Temporal   SpO2: 95% 96% 96% 98%   Weight:       Height:         Body mass index is 33.23 kg/m². Weight: 82.4 kg (181 lb 10.5 oz)  Oxygen Therapy:  Pulse Oximetry: 98 %, O2 (LPM): 0, O2 Delivery: None (Room Air)    Physical Exam    Constitutional:   Disheveled.  Alert and not somnolent.  Agitated and uncooperative.  HENT:   Head: Normocephalic and atraumatic.   Mouth/Throat: Oropharynx is clear and moist.   Eyes: Pupils are equal, round, and reactive to light. Conjunctivae and EOM are normal. Right eye exhibits no discharge. Left eye exhibits no discharge. No scleral icterus.   Neck: Neck supple. No tracheal deviation present.   Cardiovascular: Normal rate, regular rhythm and normal heart sounds.  Exam reveals no gallop and no friction rub.    No murmur heard.  Pulmonary/Chest: Effort normal. No respiratory distress. She has no wheezes.   Abdominal: Soft. She exhibits distension. There is tenderness (diffuse).   Musculoskeletal: She exhibits no edema.   Superficial abrasion on right knee.   Skin: Skin is warm and dry.   Psychiatric: No hallucinations.  Neuro:  A&O x 4. CN 1-12 grossly intact.  No focal neuro deficits.  Vitals reviewed.      Assessment/Plan     * Alcohol withdrawal syndrome without complication (HCC)- (present on admission)   Assessment & Plan    - presented from [facility] with complaints of hematemesis x 2 and was acutely intoxicated  - BAL 0.21 on admission  - multiple presentations to ED with EtOH intoxication  - no further episodes of emesis  - [facility] 8-19     - protonix 40 Q12H  - Ativan PRN for agitation  - rally bag, thiamine, folate  - D5NS  - [facility] protocol   - seizure precautions      Hematemesis- (present  on admission)   Assessment & Plan    - 2 episodes before admission, no further emesis  - Hx duodenitis and previous upper and lower GI bleed   - Hb dropped to 11.4 from 14.3 on 4/10/2019    - Protonix 40 Q12H  - H&H has been stable x 3  - CTM     Alcohol abuse- (present on admission)   Assessment & Plan    -chronic Hx of EtOH abuse  - [facility] protocol      Heme positive stool- (present on admission)   Assessment & Plan    - Protonix 40 Q12H  - monitor for further bleeding     Moderate major depression (HCC)- (present on admission)   Assessment & Plan    - history of suicide attempt via overdose  - only on clonopin 0.5 at home  - pending psych evaluation     Duodenitis without bleeding- (present on admission)   Assessment & Plan    - Protonix 40 Q12H  - add pepcid  - monitor for bleed     History of suicide attempt- (present on admission)   Assessment & Plan    - pending psych evaluation for medication adjustment     Bipolar 2 disorder (HCC)- (present on admission)   Assessment & Plan    - history of suicide attempt via overdose  - only on clonopin 0.5 at home  - pending psych evaluation     PTSD (post-traumatic stress disorder)- (present on admission)   Assessment & Plan    - history of suicide attempt via overdose  - only on clonopin 0.5 at home  - pending psych evaluation     Transaminitis- (present on admission)   Assessment & Plan    - on admission AST 62, ALT 30, lipase 89  - may indicate early pancreatitis in the setting of diffuse abdominal pain   - likely due to EtOH abuse  - trending down, CTM     Hypertension- (present on admission)   Assessment & Plan    - held home lisinopril, and aldactone  - restart these

## 2019-04-17 NOTE — PROGRESS NOTES
Bedside report received from day RN; assumed pt care. Pt assessment complete. Pt A&Ox4. Reviewed plan of care with pt. Tele box on and rhythm verified. Chart and labs reviewed. Bed in lowest position, and 2 side rails up. Pt educated on call light; call light with in reach.

## 2019-04-17 NOTE — PROGRESS NOTES
Bedside report received. Pt care assumed. Pt resting comfortably. Pt not in distress. AOx 4. Complains of a headache and shakes. Safety precautions in place. Educated to call for assistance.

## 2019-04-17 NOTE — CARE PLAN
Problem: Communication  Goal: The ability to communicate needs accurately and effectively will improve    Intervention: Rheems patient and significant other/support system to call light to alert staff of needs   04/17/19 2458   OTHER   Oriented to: All of the Following : Location of Bathroom, Visiting Policy, Unit Routine, Call Light and Bedside Controls, Bedside Rail Policy, Smoking Policy, Rights and Responsibilities, Bedside Report, and Patient Education Notebook         Problem: Safety  Goal: Will remain free from injury  Outcome: PROGRESSING AS EXPECTED  Pt safety precautions in place; bed in lowest lock position, 2 side rails up, non slip socks on, call light within reach- educated on when and how to use call light.

## 2019-04-18 LAB
ALBUMIN SERPL BCP-MCNC: 3.2 G/DL (ref 3.2–4.9)
ALBUMIN/GLOB SERPL: 1.2 G/DL
ALP SERPL-CCNC: 137 U/L (ref 30–99)
ALT SERPL-CCNC: 34 U/L (ref 2–50)
ANION GAP SERPL CALC-SCNC: 5 MMOL/L (ref 0–11.9)
AST SERPL-CCNC: 58 U/L (ref 12–45)
BASOPHILS # BLD AUTO: 1.1 % (ref 0–1.8)
BASOPHILS # BLD: 0.05 K/UL (ref 0–0.12)
BILIRUB SERPL-MCNC: 0.3 MG/DL (ref 0.1–1.5)
BUN SERPL-MCNC: 12 MG/DL (ref 8–22)
CALCIUM SERPL-MCNC: 8.6 MG/DL (ref 8.5–10.5)
CHLORIDE SERPL-SCNC: 107 MMOL/L (ref 96–112)
CO2 SERPL-SCNC: 23 MMOL/L (ref 20–33)
CREAT SERPL-MCNC: 0.54 MG/DL (ref 0.5–1.4)
EOSINOPHIL # BLD AUTO: 0.08 K/UL (ref 0–0.51)
EOSINOPHIL NFR BLD: 1.8 % (ref 0–6.9)
ERYTHROCYTE [DISTWIDTH] IN BLOOD BY AUTOMATED COUNT: 59.6 FL (ref 35.9–50)
GLOBULIN SER CALC-MCNC: 2.6 G/DL (ref 1.9–3.5)
GLUCOSE SERPL-MCNC: 124 MG/DL (ref 65–99)
HCT VFR BLD AUTO: 36.6 % (ref 37–47)
HGB BLD-MCNC: 11.4 G/DL (ref 12–16)
IMM GRANULOCYTES # BLD AUTO: 0.01 K/UL (ref 0–0.11)
IMM GRANULOCYTES NFR BLD AUTO: 0.2 % (ref 0–0.9)
LYMPHOCYTES # BLD AUTO: 1.5 K/UL (ref 1–4.8)
LYMPHOCYTES NFR BLD: 32.8 % (ref 22–41)
MAGNESIUM SERPL-MCNC: 1.4 MG/DL (ref 1.5–2.5)
MCH RBC QN AUTO: 31.5 PG (ref 27–33)
MCHC RBC AUTO-ENTMCNC: 31.1 G/DL (ref 33.6–35)
MCV RBC AUTO: 101.1 FL (ref 81.4–97.8)
MONOCYTES # BLD AUTO: 0.65 K/UL (ref 0–0.85)
MONOCYTES NFR BLD AUTO: 14.2 % (ref 0–13.4)
NEUTROPHILS # BLD AUTO: 2.28 K/UL (ref 2–7.15)
NEUTROPHILS NFR BLD: 49.9 % (ref 44–72)
NRBC # BLD AUTO: 0 K/UL
NRBC BLD-RTO: 0 /100 WBC
PHOSPHATE SERPL-MCNC: 3.6 MG/DL (ref 2.5–4.5)
PLATELET # BLD AUTO: 168 K/UL (ref 164–446)
PMV BLD AUTO: 10.4 FL (ref 9–12.9)
POTASSIUM SERPL-SCNC: 3.4 MMOL/L (ref 3.6–5.5)
PROT SERPL-MCNC: 5.8 G/DL (ref 6–8.2)
RBC # BLD AUTO: 3.62 M/UL (ref 4.2–5.4)
SODIUM SERPL-SCNC: 135 MMOL/L (ref 135–145)
WBC # BLD AUTO: 4.6 K/UL (ref 4.8–10.8)

## 2019-04-18 PROCEDURE — 99252 IP/OBS CONSLTJ NEW/EST SF 35: CPT | Mod: GC | Performed by: PSYCHIATRY & NEUROLOGY

## 2019-04-18 PROCEDURE — 99232 SBSQ HOSP IP/OBS MODERATE 35: CPT | Mod: GC | Performed by: HOSPITALIST

## 2019-04-18 PROCEDURE — A9270 NON-COVERED ITEM OR SERVICE: HCPCS | Performed by: STUDENT IN AN ORGANIZED HEALTH CARE EDUCATION/TRAINING PROGRAM

## 2019-04-18 PROCEDURE — 85025 COMPLETE CBC W/AUTO DIFF WBC: CPT

## 2019-04-18 PROCEDURE — 770020 HCHG ROOM/CARE - TELE (206)

## 2019-04-18 PROCEDURE — 700101 HCHG RX REV CODE 250: Performed by: STUDENT IN AN ORGANIZED HEALTH CARE EDUCATION/TRAINING PROGRAM

## 2019-04-18 PROCEDURE — 700105 HCHG RX REV CODE 258: Performed by: STUDENT IN AN ORGANIZED HEALTH CARE EDUCATION/TRAINING PROGRAM

## 2019-04-18 PROCEDURE — 700111 HCHG RX REV CODE 636 W/ 250 OVERRIDE (IP): Performed by: STUDENT IN AN ORGANIZED HEALTH CARE EDUCATION/TRAINING PROGRAM

## 2019-04-18 PROCEDURE — 700102 HCHG RX REV CODE 250 W/ 637 OVERRIDE(OP): Performed by: STUDENT IN AN ORGANIZED HEALTH CARE EDUCATION/TRAINING PROGRAM

## 2019-04-18 PROCEDURE — 80053 COMPREHEN METABOLIC PANEL: CPT

## 2019-04-18 PROCEDURE — C9113 INJ PANTOPRAZOLE SODIUM, VIA: HCPCS | Performed by: STUDENT IN AN ORGANIZED HEALTH CARE EDUCATION/TRAINING PROGRAM

## 2019-04-18 PROCEDURE — 83735 ASSAY OF MAGNESIUM: CPT

## 2019-04-18 PROCEDURE — 84100 ASSAY OF PHOSPHORUS: CPT

## 2019-04-18 PROCEDURE — 36415 COLL VENOUS BLD VENIPUNCTURE: CPT

## 2019-04-18 RX ORDER — POTASSIUM CHLORIDE 20 MEQ/1
40 TABLET, EXTENDED RELEASE ORAL ONCE
Status: DISCONTINUED | OUTPATIENT
Start: 2019-04-18 | End: 2019-04-18

## 2019-04-18 RX ORDER — SODIUM CHLORIDE AND POTASSIUM CHLORIDE 150; 900 MG/100ML; MG/100ML
INJECTION, SOLUTION INTRAVENOUS CONTINUOUS
Status: DISCONTINUED | OUTPATIENT
Start: 2019-04-18 | End: 2019-04-21

## 2019-04-18 RX ORDER — MAGNESIUM SULFATE HEPTAHYDRATE 40 MG/ML
2 INJECTION, SOLUTION INTRAVENOUS ONCE
Status: COMPLETED | OUTPATIENT
Start: 2019-04-18 | End: 2019-04-18

## 2019-04-18 RX ADMIN — LORAZEPAM 1 MG: 1 TABLET ORAL at 18:30

## 2019-04-18 RX ADMIN — LORAZEPAM 2 MG: 2 TABLET ORAL at 10:46

## 2019-04-18 RX ADMIN — FOLIC ACID 1 MG: 1 TABLET ORAL at 05:15

## 2019-04-18 RX ADMIN — MAGNESIUM SULFATE IN WATER 2 G: 40 INJECTION, SOLUTION INTRAVENOUS at 14:38

## 2019-04-18 RX ADMIN — LORAZEPAM 1 MG: 2 INJECTION INTRAMUSCULAR at 02:52

## 2019-04-18 RX ADMIN — LORAZEPAM 2 MG: 2 TABLET ORAL at 14:39

## 2019-04-18 RX ADMIN — FAMOTIDINE 20 MG: 10 INJECTION INTRAVENOUS at 05:15

## 2019-04-18 RX ADMIN — ACETAMINOPHEN 650 MG: 325 TABLET, FILM COATED ORAL at 22:24

## 2019-04-18 RX ADMIN — PANTOPRAZOLE SODIUM 40 MG: 40 INJECTION, POWDER, LYOPHILIZED, FOR SOLUTION INTRAVENOUS at 05:14

## 2019-04-18 RX ADMIN — LORAZEPAM 2 MG: 2 TABLET ORAL at 08:25

## 2019-04-18 RX ADMIN — LORAZEPAM 2 MG: 2 TABLET ORAL at 05:14

## 2019-04-18 RX ADMIN — LORAZEPAM 1 MG: 1 TABLET ORAL at 22:25

## 2019-04-18 RX ADMIN — ACETAMINOPHEN 650 MG: 325 TABLET, FILM COATED ORAL at 10:46

## 2019-04-18 RX ADMIN — POTASSIUM CHLORIDE AND SODIUM CHLORIDE: 900; 150 INJECTION, SOLUTION INTRAVENOUS at 14:38

## 2019-04-18 RX ADMIN — THERA TABS 1 TABLET: TAB at 05:15

## 2019-04-18 RX ADMIN — LISINOPRIL 10 MG: 10 TABLET ORAL at 05:15

## 2019-04-18 RX ADMIN — SPIRONOLACTONE 25 MG: 25 TABLET ORAL at 05:15

## 2019-04-18 RX ADMIN — DEXTROSE AND SODIUM CHLORIDE: 5; 450 INJECTION, SOLUTION INTRAVENOUS at 08:20

## 2019-04-18 RX ADMIN — Medication 100 MG: at 05:15

## 2019-04-18 RX ADMIN — PANTOPRAZOLE SODIUM 40 MG: 40 INJECTION, POWDER, LYOPHILIZED, FOR SOLUTION INTRAVENOUS at 18:30

## 2019-04-18 RX ADMIN — FAMOTIDINE 20 MG: 10 INJECTION INTRAVENOUS at 18:30

## 2019-04-18 RX ADMIN — DEXTROSE AND SODIUM CHLORIDE: 5; 450 INJECTION, SOLUTION INTRAVENOUS at 00:21

## 2019-04-18 ASSESSMENT — LIFESTYLE VARIABLES
ORIENTATION AND CLOUDING OF SENSORIUM: ORIENTED AND CAN DO SERIAL ADDITIONS
ORIENTATION AND CLOUDING OF SENSORIUM: ORIENTED AND CAN DO SERIAL ADDITIONS
TOTAL SCORE: 11
AGITATION: NORMAL ACTIVITY
TREMOR: *
AGITATION: NORMAL ACTIVITY
TOTAL SCORE: MILD ITCHING, PINS AND NEEDLES SENSATION, BURNING OR NUMBNESS
HEADACHE, FULLNESS IN HEAD: MODERATE
ANXIETY: *
HEADACHE, FULLNESS IN HEAD: MODERATE
VISUAL DISTURBANCES: VERY MILD SENSITIVITY
PAROXYSMAL SWEATS: BARELY PERCEPTIBLE SWEATING. PALMS MOIST
AUDITORY DISTURBANCES: NOT PRESENT
AGITATION: NORMAL ACTIVITY
ANXIETY: *
VISUAL DISTURBANCES: NOT PRESENT
NAUSEA AND VOMITING: MILD NAUSEA WITH NO VOMITING
TOTAL SCORE: 14
ORIENTATION AND CLOUDING OF SENSORIUM: ORIENTED AND CAN DO SERIAL ADDITIONS
NAUSEA AND VOMITING: MILD NAUSEA WITH NO VOMITING
TOTAL SCORE: VERY MILD ITCHING, PINS AND NEEDLES SENSATION, BURNING OR NUMBNESS
ANXIETY: *
AUDITORY DISTURBANCES: NOT PRESENT
TOTAL SCORE: 14
HEADACHE, FULLNESS IN HEAD: MODERATE
HEADACHE, FULLNESS IN HEAD: MILD
HEADACHE, FULLNESS IN HEAD: MODERATE
AUDITORY DISTURBANCES: NOT PRESENT
ORIENTATION AND CLOUDING OF SENSORIUM: ORIENTED AND CAN DO SERIAL ADDITIONS
TREMOR: *
NAUSEA AND VOMITING: NO NAUSEA AND NO VOMITING
HEADACHE, FULLNESS IN HEAD: MILD
TOTAL SCORE: MILD ITCHING, PINS AND NEEDLES SENSATION, BURNING OR NUMBNESS
PAROXYSMAL SWEATS: *
PAROXYSMAL SWEATS: *
AUDITORY DISTURBANCES: NOT PRESENT
ORIENTATION AND CLOUDING OF SENSORIUM: ORIENTED AND CAN DO SERIAL ADDITIONS
HEADACHE, FULLNESS IN HEAD: MODERATE
TREMOR: *
ANXIETY: *
ANXIETY: *
AUDITORY DISTURBANCES: NOT PRESENT
PAROXYSMAL SWEATS: *
TOTAL SCORE: VERY MILD ITCHING, PINS AND NEEDLES SENSATION, BURNING OR NUMBNESS
TOTAL SCORE: 8
TREMOR: *
TREMOR: TREMOR NOT VISIBLE BUT CAN BE FELT, FINGERTIP TO FINGERTIP
TOTAL SCORE: MILD ITCHING, PINS AND NEEDLES SENSATION, BURNING OR NUMBNESS
PAROXYSMAL SWEATS: *
TOTAL SCORE: MILD ITCHING, PINS AND NEEDLES SENSATION, BURNING OR NUMBNESS
ANXIETY: *
AUDITORY DISTURBANCES: NOT PRESENT
TREMOR: *
VISUAL DISTURBANCES: NOT PRESENT
NAUSEA AND VOMITING: NO NAUSEA AND NO VOMITING
TOTAL SCORE: 14
AGITATION: SOMEWHAT MORE THAN NORMAL ACTIVITY
VISUAL DISTURBANCES: VERY MILD SENSITIVITY
ANXIETY: *
AUDITORY DISTURBANCES: NOT PRESENT
AGITATION: SOMEWHAT MORE THAN NORMAL ACTIVITY
VISUAL DISTURBANCES: NOT PRESENT
PAROXYSMAL SWEATS: *
TOTAL SCORE: MILD ITCHING, PINS AND NEEDLES SENSATION, BURNING OR NUMBNESS
TOTAL SCORE: 8
VISUAL DISTURBANCES: VERY MILD SENSITIVITY
VISUAL DISTURBANCES: NOT PRESENT
ORIENTATION AND CLOUDING OF SENSORIUM: ORIENTED AND CAN DO SERIAL ADDITIONS
TOTAL SCORE: 11
AGITATION: NORMAL ACTIVITY
AGITATION: SOMEWHAT MORE THAN NORMAL ACTIVITY
NAUSEA AND VOMITING: NO NAUSEA AND NO VOMITING
ORIENTATION AND CLOUDING OF SENSORIUM: ORIENTED AND CAN DO SERIAL ADDITIONS
PAROXYSMAL SWEATS: BARELY PERCEPTIBLE SWEATING. PALMS MOIST
NAUSEA AND VOMITING: NO NAUSEA AND NO VOMITING
NAUSEA AND VOMITING: NO NAUSEA AND NO VOMITING
TREMOR: TREMOR NOT VISIBLE BUT CAN BE FELT, FINGERTIP TO FINGERTIP

## 2019-04-18 ASSESSMENT — ENCOUNTER SYMPTOMS
ABDOMINAL PAIN: 1
HALLUCINATIONS: 0
CONSTIPATION: 0
TINGLING: 0
FOCAL WEAKNESS: 0
DIARRHEA: 0
TREMORS: 1
SEIZURES: 0
SENSORY CHANGE: 0
VOMITING: 0
CHILLS: 0
FEVER: 0
NAUSEA: 0
HEADACHES: 1
PALPITATIONS: 0

## 2019-04-18 NOTE — CARE PLAN
Problem: Safety  Goal: Will remain free from falls  Outcome: PROGRESSING AS EXPECTED  Pt safety precautions in place; bed in lowest lock position, 2 side rails up, non slip socks on, call light within reach- educated on when and how to use call light.

## 2019-04-18 NOTE — PROGRESS NOTES
Internal Medicine Interval Note  Note Author: Fransisco Lopez M.D.     Name Ashleigh Marquis     1960   Age/Sex 58 y.o. female   MRN [id number]   Code Status Full     After 5PM or if no immediate response to page, please call for cross-coverage  Attending/Team: Thomas/Edwin See Patient List for primary contact information  Call [phone] to page    1st Call - Day Intern (R1):   Teresa 2nd Call - Day Sr. Resident (R2/R3):   Jessica         Reason for interval visit  (Principal Problem)     Alcohol withdrawal    Interval Problem Daily Status Update  (24 hours, problem oriented, brief subjective history, new lab/imaging data pertinent to that problem)     Continued alcohol withdrawal.  CIWA scores 9-14 since yesterday, 15.5 mg of Ativan given between yesterday and this morning.  Continued trending and replacing electrolytes.  Psychiatry consulted for suspected underlying and history of psychiatric disorders, patient uncooperative with exam.  We will continue to treat for alcohol withdrawal.    Review of Systems   Constitutional: Negative for chills and fever.   Cardiovascular: Negative for chest pain and palpitations.   Gastrointestinal: Positive for abdominal pain. Negative for constipation, diarrhea, nausea and vomiting.   Genitourinary: Positive for urgency.   Skin: Negative for itching and rash.   Neurological: Positive for tremors and headaches. Negative for tingling, sensory change, focal weakness and seizures.   Psychiatric/Behavioral: Negative for hallucinations.   All other systems reviewed and are negative.      Disposition/Barriers to discharge:   Continued alcohol withdrawal    Consultants/Specialty  Psychiatry     PCP: None      Quality Measures  Quality-Core Measures   Reviewed items::  Labs reviewed and Medications reviewed  Velásquez catheter::  No Velásquez  DVT prophylaxis - mechanical:  SCDs      Physical Exam       Vitals:    19 0000 19 0407 19 0804 19 1250    BP: 151/90 (!) 161/81 149/85 144/89   Pulse: 87 84 74 93   Resp: 16 16 18 18   Temp: 36.4 °C (97.5 °F) 36.5 °C (97.7 °F) 36.4 °C (97.5 °F) 36.2 °C (97.2 °F)   TempSrc: Temporal Temporal Temporal Temporal   SpO2: 98% 98% 98% 98%   Weight:       Height:         Body mass index is 32.78 kg/m². Weight: 81.3 kg (179 lb 3.7 oz)  Oxygen Therapy:  Pulse Oximetry: 98 %, O2 (LPM): 0, O2 Delivery: None (Room Air)    Physical Exam  Constitutional:   Alert. Not cooperative.  HENT:   Head: Normocephalic and atraumatic.   Mouth/Throat: Oropharynx is clear and moist.   Eyes: Pupils are equal, round, and reactive to light. Conjunctivae and EOM are normal.  No scleral icterus.   Neck: Neck supple. No tracheal deviation present.   Cardiovascular: Normal rate, regular rhythm and normal heart sounds.  Exam reveals no gallop and no friction rub.    No murmur heard.  Pulmonary/Chest: Effort normal. No respiratory distress. She has no wheezes.   Abdominal: Soft. No rebound or guarding.  Musculoskeletal: She exhibits no edema.   Skin: Skin is warm and dry.   Psychiatric: No hallucinations.  Neuro:  A&O x 4. CN 1-12 grossly intact.  No focal neuro deficits.  Vitals reviewed.      Assessment/Plan     * Alcohol withdrawal syndrome without complication (HCC)- (present on admission)   Assessment & Plan    Continued alcohol withdrawal. Awake, protecting airway. No seizures. Question of psychiatric disorder(s) as causative factor in alcohol abuse.    - protonix 40 Q12H  - CIWA  - thiamine, folate, MV  - IVF  - seizure precautions   - Psychiatry consulted, unfortunately patient not cooperative during assessment  - cessation counseling, provide rehab resources      Hematemesis- (present on admission)   Assessment & Plan    Patient reported 2 episodes before admission, not witnessed. None during hospital course. History of duodenitis and previous upper and lower GI bleed. Hemoglobin stable.    - Protonix 40 Q12H  - Monitor CBC     Heme positive  "stool- (present on admission)   Assessment & Plan    On exam by ED physician.    -Plan per \"hematemesis\" problem     History of suicide attempt- (present on admission)   Assessment & Plan    Denies suicidal ideation.    - Psychiatry consulted, unfortunately patient not cooperative during assessment     Bipolar 2 disorder (HCC)- (present on admission)   Assessment & Plan    - Psychiatry consulted, unfortunately patient not cooperative during assessment     PTSD (post-traumatic stress disorder)- (present on admission)   Assessment & Plan    - Psychiatry consulted, unfortunately patient not cooperative during assessment     Transaminitis- (present on admission)   Assessment & Plan    Likely due to EtOH abuse. Improved.    - Monitor chemistry  - Alcohol cessation counseling     Hypertension- (present on admission)   Assessment & Plan    SBP up to 160s.    - continue home lisinopril, Aldactone  - needs Primary Care follow up and recheck out of acute setting       "

## 2019-04-18 NOTE — NON-PROVIDER
"Medical Student Note - Do Not Use    PSYCHIATRIC CONSULTATION:  *Reason for admission:     *Reason for consult:PTSD   *Requesting Physician:  Supervising Physician:Ayesha Sagastume MD         Legal status:  not applicable    *Chief Complaint: GI bleeding; Alcohol abuse    HPI:   This is a 58 year old patient who is being treated for alcoholic intoxication and upper GI bleeding on [facility] protocol. This consult is for her reported history of bipolar, depression, and PTSD that is not being treated with medication currently. Upon visit patient was very agitated and irritable stating that she did not want to talk about her Bipolar as \"it was ruled out a long time ago\". She stated that she has been seeing a Psychiatrist for the past 3 years and is being treated for PTSD and depression with Prozac. She did not give consent for our team to contact her Psychiatrist for further questioning. She also states that she was given a prescription for mood stabilizers in the past including carbamazepine, without knowing the dose or length of time of treatment. Chart review shows a strong history of frequent hospital visits for alcoholic intoxication and withdrawal. It is unclear if patient uses any other substances. Due to patient's agitation it was not identified if there were any SI/HI/VH/AH currently      Psychiatric Review of Systems:current symptoms as reported by pt.  Depression:      Could not obtain due to agitated/noncooperative state  Janette:Could not obtain due to agitated/noncooperative state  Anxiety/Panic Attacks Could not obtain due to agitated/noncooperative state  PTSD symptom: Could not obtain due to agitated/noncooperative state  Psychosis:Could not obtain due to agitated/noncooperative state  Other:Could not obtain due to agitated/noncooperative state       Medical Review of Systems: as reported by pt. All systems reviewed.all other systems were reviewed and are negative     For teaching purposes the systems below must " be noted, + or negative:  Neurological:    TBIs:Could not obtain due to agitated/noncooperative state   SZs:Could not obtain due to agitated/noncooperative state   Strokes:Could not obtain due to agitated/noncooperative state   Other:Could not obtain due to agitated/noncooperative state  Other medical symptoms:   Thyroid: Could not obtain due to agitated/noncooperative state   Diabetes:Could not obtain due to agitated/noncooperative state   Cardiovascular disease:Could not obtain due to agitated/noncooperative state    Psychiatric Examination: observed phenomenon:  Vitals:  Appearance: Sleepy  Muscle Strength/Tone: Did not test  Gait/Station:Did not test  Speech: Appropriate rate and articulation  Thought Process: Linear and goal directed  Associations: Intact  Abnormal/Psychotic Thoughts (ex): Did not address  Insight/Judgement: Poor to average  Orientation: Orientated  Memory: Short term memory intact  Attention/Concentration: Few redirections needed during interview; mild hearing impairment possible  Language: Fluent  Fund of Knowledge: Average  Mood: Tired  Affect: Irritable  SI/HI: Could not obtain due to agitated/noncooperative state  Neurological Testing:( ie clock, cube drawing, MMSE, MOCA,etc.) Could not obtain due to agitated/noncooperative state     Past Psychiatric Hx:   Follows a psychiatrist for the past 3 years for PTSD and depression    Family Psychiatric Hx: Did not obtain due to agitated/noncooperative state    Social Hx: Did not obtain due to agitated/noncooperative state    Drug/Alcohol/Tobacco Hx:   Drugs: Did not obtain due to agitated/noncooperative state   Alcohol: Frequent etoh abuse and withdrawal episodes per chart review   Tobacco: Did not obtain due to agitated/noncooperative state    Medical Hx: labs, MARS, medications, etc were reviewed. Only those findings of potential interest to psychiatry are noted below:  Medical Conditions:     Past Medical History:   Diagnosis Date   •  "Alcoholism ([facility])    • Anxiety    • Arthritis    • ASTHMA    • Cancer (HCC) 1981    cervical   • Chronic low back pain    • Congestive heart failure (HCC)    • Depression    • EtOH dependence ([facility])    • Fall     alcohol related   • GERD (gastroesophageal reflux disease)    • HTN    • Hypertension    • Indigestion     GERD   • Muscle disorder    • OSTEOPOROSIS    • Other specified symptom associated with female genital organs     \"I have fibroids bad\"\"   • Psychiatric disorder    • PTSD (post-traumatic stress disorder)    • Renal disorder     Hx of stones   • Seizure ([facility])     several years ago r/t alcohol withdrawl   • Ulcer    • Vitamin D deficiency      Allergies:   Allergies   Allergen Reactions   • Sulfa Drugs Vomiting   • Toradol Vomiting   • Gabapentin      Bowel incontinence     • Amoxicillin Rash     Reported by NAIDA on arrival to ED    Pt states she takes PCN 10/3     Medications (currently prescribed at [facility]):  Labs:  ECG: QTc - n/a    Cranial Imaging: reviewed  Other:      ASSESSMENT: (Alcohol abuse; PTSD; MDD)  This patient is suffering from heavy alcohol abuse resulting in her current visit with upper GI bleed. It is unknown the severity of her stated PTSD or depression due to her non cooperative nature during interview. Prozac is not a medication that is in her chart currently although she states that she has been taking it for 3 years. A definitive diagnosis at this time of a mood disorder or PTSD without further information from this patient can be made.    PLAN:  Encourage alcohol cessation  Recommend Naltrexone, Disulfram, or Acamprosate therapy  Follow up with outpatient Psychiatrist within one week for continuing treatment of possible mood disorder and PTSD.    Legal status: not applicable  [place] [address] eligible:   Eligible for transfer to [place] [address] (ED only): NO   Discussed patient with other provider:  Will follow/Signing off   Thank you for the consult.  "

## 2019-04-18 NOTE — CONSULTS
"PSYCHIATRIC CONSULTATION:  Reason for admission: Upper GI bleeding secondary to alcohol use disorder  Reason for consult: PTSD  Requesting Physician: JUDE Guzman M.D.  Supervising Physician: Vidhi Restrepo M.D.    Legal status:  not applicable    Chief Complaint: \"I do not need to talk to you I have my own psychiatrist\"    HPI:   This is a 58 year old patient who is being treated for alcoholic intoxication and upper GI bleeding on [facility] protocol. This consult is for her reported history of bipolar, depression, and PTSD that is not being treated with medication currently. Upon visit patient was very agitated and irritable stating that she did not want to talk about her Bipolar as \"it was ruled out a long time ago\". She stated that she has been seeing a Psychiatrist for the past 3 years and is being treated for PTSD and depression with Prozac. She did not give consent for our team to contact her Psychiatrist for further questioning. She also states that she was given a prescription for mood stabilizers in the past including carbamazepine, without knowing the dose or length of time of treatment. Chart review shows a strong history of frequent hospital visits for alcoholic intoxication and withdrawal. It is unclear if patient uses any other substances. Due to patient's agitation it was not identified if there were any SI/HI/VH/AH currently     Psychiatric Review of Systems:current symptoms as reported by pt.  Psychiatric Review of Systems:current symptoms as reported by pt.   Depression: Could not obtain due to agitated/noncooperative state   Janette: Could not obtain due to agitated/noncooperative state   Anxiety/Panic Attacks: Could not obtain due to agitated/noncooperative state   PTSD symptom: Could not obtain due to agitated/noncooperative state   Psychosis:Could not obtain due to agitated/noncooperative state   Other:Could not obtain due to agitated/noncooperative state     Medical Review of " "Systems:  Constitutional: Negative for fever, chills, weight loss  HENT: Negative for hearing loss, sore throat, neck pain  Eyes: Negative for blurred vision, pain, redness.   Respiratory: Negative for cough, shortness of breath, wheezing   Cardiovascular: Negative for chest pain, palpitations  Gastrointestinal: Negative for nausea, vomiting, diarrhea, constipation, blood in stool  Genitourinary: Negative for dysuria, urgency, frequency, hematuria  Musculoskeletal: Negative for myalgias,  joint pain  Skin: Negative for itching and rash.  Neurological: Negative for dizziness, tingling, tremors, weakness and headaches.   Psychiatric/Behavioral: See above for psych review of systems    TBIs:Could not obtain due to agitated/noncooperative state   SZs:Could not obtain due to agitated/noncooperative state   Strokes:Could not obtain due to agitated/noncooperative state   Other:Could not obtain due to agitated/noncooperative state   Other medical symptoms:   Thyroid: Could not obtain due to agitated/noncooperative state   Diabetes:Could not obtain due to agitated/noncooperative state   Cardiovascular disease:Could not obtain due to agitated/noncooperative state     Psychiatric Examination:  Vitals: /85   Pulse 74   Temp 36.4 °C (97.5 °F) (Temporal)   Resp 18   Ht 1.575 m (5' 2\")   Wt 81.3 kg (179 lb 3.7 oz)   LMP 11/02/2015   SpO2 98%   Breastfeeding? No   BMI 32.78 kg/m²    Musculoskeletal: wnl  Appearance: fair hygiene, no apparent distress and irritable, defiant, poor eye contact and minimally cooperative  Thoughts: patient denies SI and HI, linear, coherent, goal-oriented and organized  Speech: regular rate, rhythm, volume, tone, and syntax  Mood: ok  Affect: irritable  SI/HI: Denies SI and HI  Alert/Oriented: alert and oriented  Memory: Could not obtain due to agitated/noncooperative state . no gross impairment  Fund of Knowledge: Could not obtain due to agitated/noncooperative state  no gross " "impairment  Insight/Judgement into symptoms: fair  Neurological Testing: Could not obtain due to agitated/noncooperative state  no gross impairment      Past Psychiatric Hx:  Follows a psychiatrist for the past 3 years for PTSD and depression.  Would not elaborate into further questions about past psychiatric history.    Family Psychiatric Hx:  Did not obtain due to agitated/noncooperative state     Social Hx:   Did not obtain due to agitated/noncooperative state     Drug/Alcohol/Tobacco Hx:  Drugs: Did not obtain due to agitated/noncooperative state   Alcohol: Frequent etoh abuse and withdrawal episodes per chart review  Tobacco: Did not obtain due to agitated/noncooperative state     Medical Hx: labs, MARS, medications, etc were reviewed. Only those findings of potential interest to psychiatry are noted below:    Medical Conditions:   Past Medical History:   Diagnosis Date   • Alcoholism ([facility])    • Anxiety    • Arthritis    • ASTHMA    • Cancer (HCC) 1981    cervical   • Chronic low back pain    • Congestive heart failure (HCC)    • Depression    • EtOH dependence (HCC)    • Fall     alcohol related   • GERD (gastroesophageal reflux disease)    • HTN    • Hypertension    • Indigestion     GERD   • Muscle disorder    • OSTEOPOROSIS    • Other specified symptom associated with female genital organs     \"I have fibroids bad\"\"   • Psychiatric disorder    • PTSD (post-traumatic stress disorder)    • Renal disorder     Hx of stones   • Seizure ([facility])     several years ago r/t alcohol withdrawl   • Ulcer    • Vitamin D deficiency      Allergies:   Allergies   Allergen Reactions   • Sulfa Drugs Vomiting   • Toradol Vomiting   • Gabapentin      Bowel incontinence     • Amoxicillin Rash     Reported by NAIDA on arrival to ED    Pt states she takes PCN 10/3     Medications (currently prescribed at [facility]):    Current Facility-Administered Medications:   •  magnesium sulfate IVPB premix 2 g, 2 g, Intravenous, Once, Fransisco ROBERTS " KATIE Lopez  •  0.9 % NaCl with KCl 20 mEq infusion, , Intravenous, Continuous, Fransisco Lopez M.D.  •  famotidine (PEPCID) injection 20 mg, 20 mg, Intravenous, BID, Vic Moore M.D., 20 mg at 04/18/19 0515  •  lisinopril (PRINIVIL) 10 MG tablet 10 mg, 10 mg, Oral, DAILY, Vic Moore M.D., 10 mg at 04/18/19 0515  •  spironolactone (ALDACTONE) tablet 25 mg, 25 mg, Oral, DAILY, Vic Moore M.D., 25 mg at 04/18/19 0515  •  Respiratory Care per Protocol, , Nebulization, Continuous RT, Fransisco Lopez M.D.  •  acetaminophen (TYLENOL) tablet 650 mg, 650 mg, Oral, Q6HRS PRN, Fransisco Lopez M.D., 650 mg at 04/18/19 1046  •  nicotine (NICODERM) 21 MG/24HR 21 mg, 21 mg, Transdermal, Daily-0600, 21 mg at 04/17/19 0600 **AND** Nicotine Replacement Patient Education Materials, , , Once **AND** nicotine polacrilex (NICORETTE) 2 MG piece 2 mg, 2 mg, Oral, Q HOUR PRN, Fransisco Lopez M.D.  •  LORazepam (ATIVAN) tablet 0.5 mg, 0.5 mg, Oral, Q4HRS PRN, Fransisco Lopez M.D., 0.5 mg at 04/17/19 1223  •  LORazepam (ATIVAN) tablet 1 mg, 1 mg, Oral, Q4HRS PRN, 1 mg at 04/17/19 0132 **OR** LORazepam (ATIVAN) injection 0.5 mg, 0.5 mg, Intravenous, Q4HRS PRN, Fransisco Lopez M.D., 0.5 mg at 04/16/19 1554  •  LORazepam (ATIVAN) tablet 2 mg, 2 mg, Oral, Q2HRS PRN, 2 mg at 04/18/19 1046 **OR** LORazepam (ATIVAN) injection 1 mg, 1 mg, Intravenous, Q2HRS PRN, Fransisco Lopez M.D., 1 mg at 04/18/19 0252  •  LORazepam (ATIVAN) tablet 3 mg, 3 mg, Oral, Q HOUR PRN, 3 mg at 04/17/19 0801 **OR** LORazepam (ATIVAN) injection 1.5 mg, 1.5 mg, Intravenous, Q HOUR PRN, Fransisco Lopez M.D., 1.5 mg at 04/16/19 2307  •  LORazepam (ATIVAN) tablet 4 mg, 4 mg, Oral, Q15 MIN PRN **OR** LORazepam (ATIVAN) injection 2 mg, 2 mg, Intravenous, Q15 MIN PRN, Fransisco Lopez M.D.  •  pantoprazole (PROTONIX) injection 40 mg, 40 mg, Intravenous, Q12HRS, Fransisco Lopez M.D., 40 mg at 04/18/19 0514  •  D5 1/2 NS infusion, , Intravenous, Continuous,  Fransisco Lopez M.D., Last Rate: 150 mL/hr at 19 0820  •  thiamine tablet 100 mg, 100 mg, Oral, DAILY, 100 mg at 19 0515 **AND** multivitamin (THERAGRAN) tablet 1 Tab, 1 Tab, Oral, DAILY, 1 Tab at 19 0515 **AND** folic acid (FOLVITE) tablet 1 mg, 1 mg, Oral, DAILY, Vic Moore M.D., 1 mg at 19 0515  Labs:  Recent Labs      19   0557  19   1511  19   0332  19   0242   WBC  6.3   --   5.3  4.6*   RBC  3.62*   --   3.71*  3.62*   HEMOGLOBIN  11.4*  11.0*  12.0  11.4*   HEMATOCRIT  36.5*  34.7*  36.7*  36.6*   MCV  101.7*   --   98.9*  101.1*   MCH  31.2   --   32.3  31.5   MCHC  30.7*   --   32.7*  31.1*   RDW  59.1*   --   58.4*  59.6*   PLATELETCT  151*   --   141*  168   MPV  10.4   --   11.4  10.4     Recent Labs      19   1759  19   0332  19   0242   SODIUM  138  140  135   POTASSIUM  3.8  3.7  3.4*   CHLORIDE  107  110  107   CO2  23  22  23   GLUCOSE  109*  108*  124*   BUN  10  10  12   CREATININE  0.64  0.66  0.54   CALCIUM  8.3*  8.3*  8.6     Recent Labs      19   0557   APTT  25.3   INR  0.89                    ECG:   Test Date:  2019   Pt Name:    LAKSHMI DARLING              Department: [facility]   MRN:        [id number]                      [facility]   Gender:     Female                       Technician: [id number]   :        1960                   Requested By:ARPAN VILLAVICENCIO   Order #:    [id number]                    Reading MD: ARPAN VILLAVICENCIO MD     Measurements   Intervals                                Axis   Rate:       82                           P:          57   VT:         150                          QRS:        11   QRSD:       100                          T:          19   QT:         381   QTc:        445     Cranial Imaging: reviewed  No orders to display       ASSESSMENT:   This is a 58 year old patient who is being treated for alcoholic intoxication and upper GI bleeding on CIWA protocol. It is  unknown the severity of her stated PTSD or depression due to her non cooperative nature during interview. Prozac is not a medication that is in her chart currently although she states that she has been taking it for 3 years. A definitive diagnosis at this time of a mood disorder or PTSD without further information from this patient can be made.  Starting Prozac at this time is not recommended as patient has bipolar disorder in the chart and was uncooperative with further evaluation of psychiatric review of systems.    -Alcohol use disorder, severe, chronic  -Rule out PTSD  -Rule out major depressive disorder    PLAN:  -Starting Prozac is not recommended at this time as patient has bipolar disorder in the chart and was uncooperative with further evaluation of psychiatric review of systems  -Would like to contact patient's psychiatrist for continuity of care the patient refusing at this time  -Social work please provide patient with list of rehab programs for alcohol use disorder  -Social work please help patient establish outpatient follow-up psychiatric appointment within 1 week of discharge  - Reviewed risks, benefits, alternatives to include no treatment, therapy and medications  - Legal status: not applicable  Thank you for the consult. Psych signing off.

## 2019-04-18 NOTE — NON-PROVIDER
Internal Medicine Medical Student Note  Attending: JUDE Guzman M.D.  Note Author: Dolores Brantley, Student    Name Ashleigh Marquis 1960   Age/Sex 58 y.o. female   MRN [id number]   Code Status FULL             Reason for interval visit  (Principal Problem)   Alcohol withdrawal syndrome without complication (HCC)    Interval Problem Daily Status Update  (problem status, last 24 hours, new history, new data )   No acute events overnight. CIWA scores have been 12-14 over the last 24h. Patient received ativan x11 in the past 24 hours. Patient uncooperative with interview this morning.    Review of Systems   Reason unable to perform ROS: Patient uncooperative with interview.       Physical Exam     Vitals:  Vitals:    19 2105 19 0000 19 0407 19 0804   BP: 129/84 151/90 (!) 161/81 149/85   Pulse: 100 87 84 74   Resp:    Temp: 36.6 °C (97.8 °F) 36.4 °C (97.5 °F) 36.5 °C (97.7 °F) 36.4 °C (97.5 °F)   TempSrc: Temporal Temporal Temporal Temporal   SpO2: 94% 98% 98% 98%   Weight: 81.3 kg (179 lb 3.7 oz)      Height:             Physical Exam:  Physical Exam   Constitutional: She is well-developed, well-nourished, and in no distress.   Patient refused exam     Recent Labs      19   0557  19   1511  19   0332  19   0242   WBC  6.3   --   5.3  4.6*   RBC  3.62*   --   3.71*  3.62*   HEMOGLOBIN  11.4*  11.0*  12.0  11.4*   HEMATOCRIT  36.5*  34.7*  36.7*  36.6*   MCV  101.7*   --   98.9*  101.1*   MCH  31.2   --   32.3  31.5   RDW  59.1*   --   58.4*  59.6*   PLATELETCT  151*   --   141*  168   MPV  10.4   --   11.4  10.4   NEUTSPOLYS  56.40   --   49.50  49.90   LYMPHOCYTES  29.60   --   32.80  32.80   MONOCYTES  10.90   --   14.30*  14.20*   EOSINOPHILS  1.40   --   1.90  1.80   BASOPHILS  0.90   --   1.10  1.10     Recent Labs      19   1759  19   0332  19   0242   SODIUM  138  140  135   POTASSIUM  3.8  3.7  3.4*    CHLORIDE  107  110  107   CO2  23  22  23   GLUCOSE  109*  108*  124*   BUN  10  10  12     Recent Labs      04/16/19   0557  04/16/19 1759 04/17/19   0332  04/18/19   0242   ALBUMIN  3.6   --   3.1*  3.2   TBILIRUBIN  0.2   --   0.3  0.3   ALKPHOSPHAT  143*   --   135*  137*   TOTPROTEIN  6.7   --   5.8*  5.8*   ALTSGPT  30   --   28  34   ASTSGOT  62*   --   53*  58*   CREATININE  0.66  0.64  0.66  0.54     Recent Labs      04/16/19   0557  04/17/19   0332  04/18/19   0242   ASTSGOT  62*  53*  58*   ALTSGPT  30  28  34   TBILIRUBIN  0.2  0.3  0.3   ALKPHOSPHAT  143*  135*  137*   GLOBULIN  3.1  2.7  2.6   INR  0.89   --    --    AMMONIA  48*   --    --      Recent Labs      04/16/19 0557   APTT  25.3   INR  0.89     Recent Labs      04/16/19 1759 04/17/19   0332  04/18/19   0242   SODIUM  138  140  135   POTASSIUM  3.8  3.7  3.4*   CHLORIDE  107  110  107   CO2  23  22  23   GLUCOSE  109*  108*  124*   BUN  10  10  12   CREATININE  0.64  0.66  0.54           No intake or output data in the 24 hours ending 04/18/19 1340        Assessment/Plan     # GI Bleed  Patient has a long history of alcohol abuse. Previously admitted to the ICU in October 2018 for GI bleeding. She was found to have multiple ulcers in the body and antrum of the stomach on EGD. She was perscribed omeprazole and carafate at time of discharge but unclear if she has been taking them. At time of admission vital signs are stable with no fever, tachycardia, or severe hypotension.  - Hgb 11.4 and Hct 36.5 at time of admission              - Hgb was 14.3 at last ED visit 4/10/18  - Stool was guaiac positive in the ED  - Hgb stable now 11.4. Hct 36.6     Plan:  - Decrease IVF to 125 mL/hr   - Mechanical soft diet   - Trend H&H  - Continue Protonix 40 mg IV Q12h  - Pepcid 20 mg IV BID  - Consider GI consult if she continues to bleed or becomes hemodynamically unstable        # Acute Alcohol Intoxication  # Alcohol Abuse  Patient brought in by  EMS from [facility] with complaints of hematemesis x2 and was acutely intoxicated. BAL on admission was 0.21. She has a long standing history of alcohol abuse and has been seen in the ED for intoxication numerous times.  - CIWA scores have been 8-19 she has been medicated with ativan x12 since admission     Plan:  - Ativan PRN for agitation  - Continue thiamine, folate  - Decrease IVF to NS with 20 mEq KCl at 125 mL/hr  - Continue CIWA protocol     # Alcoholic gastritis  Diagnosed on a previous admission. EGD at that time showed multiple ulcers in the antrum and body of the stomach. Patient was discharged with omeprazole and carafate but it is unclear if she ever took them. She continues to drink heavily.     Plan:  - Trend H&H  - Protonix 40 mg IV q12h  - Pepcid 20 mg IV BID     # Transaminitis  - On admission AST 62, ALT 30, Lipase 89  - Patient reports diffuse abdominal pain, could indicate early pancreatitis  - Likely due to alcohol abuse  - Stable AST 58, ALT 34, [id number]     Plan  - Continue to monitor  - Repeat CMP in am     # Hypertension  - Patient previously diagnosed  - Prescribed lisinopril and aldactone  - BP on admission was 80s-100s/50s-60s     Plan:  - Restart home lisinopril and aldactone     # History of Bipolar II  # History of Suicide attempt  - Patient has a history of bipolar II and depression  - history of suicide attempt by overdose  - Not currently on psychiatric medication     Plan:  - Psych consult to evaluate the need for psychiatric medication still pending         PROBLEM LIST:  Active Hospital Problems    Diagnosis   • Hematemesis [K92.0]     Priority: High   • Alcohol withdrawal syndrome without complication (HCC) [F10.230]     Priority: High   • Alcohol abuse [F10.10]     Priority: High   • Heme positive stool [R19.5]     Priority: Medium   • Moderate major depression (HCC) [F32.1]     Priority: Medium   • Duodenitis without bleeding [K29.80]     Priority: Medium   • History of  suicide attempt [Z91.5]     Priority: Medium   • PTSD (post-traumatic stress disorder) [F43.10]     Priority: Medium   • Bipolar 2 disorder (HCC) [F31.81]     Priority: Medium   • Transaminitis [R74.0]     Priority: Low   • Hypertension [I10]     Priority: Low       #Core Measures   VTE PPx: SCDs  Abx: none  Lines/Tubes: peripheral IV left AC, peripheral IV right AC  Fluids NS 20 mEq KCl 125 mL/hr  Diet: Mechanical Soft diet as tolerated  Code Status: FULL    Dispo: Inpatient

## 2019-04-19 LAB
ALBUMIN SERPL BCP-MCNC: 3.1 G/DL (ref 3.2–4.9)
ALBUMIN/GLOB SERPL: 1.1 G/DL
ALP SERPL-CCNC: 127 U/L (ref 30–99)
ALT SERPL-CCNC: 36 U/L (ref 2–50)
ANION GAP SERPL CALC-SCNC: 8 MMOL/L (ref 0–11.9)
AST SERPL-CCNC: 61 U/L (ref 12–45)
BILIRUB SERPL-MCNC: 0.2 MG/DL (ref 0.1–1.5)
BUN SERPL-MCNC: 12 MG/DL (ref 8–22)
CALCIUM SERPL-MCNC: 8.2 MG/DL (ref 8.5–10.5)
CHLORIDE SERPL-SCNC: 114 MMOL/L (ref 96–112)
CO2 SERPL-SCNC: 20 MMOL/L (ref 20–33)
CREAT SERPL-MCNC: 0.62 MG/DL (ref 0.5–1.4)
ERYTHROCYTE [DISTWIDTH] IN BLOOD BY AUTOMATED COUNT: 62.1 FL (ref 35.9–50)
GLOBULIN SER CALC-MCNC: 2.7 G/DL (ref 1.9–3.5)
GLUCOSE SERPL-MCNC: 118 MG/DL (ref 65–99)
HCT VFR BLD AUTO: 36.7 % (ref 37–47)
HGB BLD-MCNC: 11.1 G/DL (ref 12–16)
MAGNESIUM SERPL-MCNC: 1.8 MG/DL (ref 1.5–2.5)
MCH RBC QN AUTO: 31.7 PG (ref 27–33)
MCHC RBC AUTO-ENTMCNC: 30.2 G/DL (ref 33.6–35)
MCV RBC AUTO: 104.9 FL (ref 81.4–97.8)
PHOSPHATE SERPL-MCNC: 3.7 MG/DL (ref 2.5–4.5)
PLATELET # BLD AUTO: 193 K/UL (ref 164–446)
PMV BLD AUTO: 10.5 FL (ref 9–12.9)
POTASSIUM SERPL-SCNC: 4 MMOL/L (ref 3.6–5.5)
PROT SERPL-MCNC: 5.8 G/DL (ref 6–8.2)
RBC # BLD AUTO: 3.5 M/UL (ref 4.2–5.4)
SODIUM SERPL-SCNC: 142 MMOL/L (ref 135–145)
WBC # BLD AUTO: 4.6 K/UL (ref 4.8–10.8)

## 2019-04-19 PROCEDURE — 83735 ASSAY OF MAGNESIUM: CPT

## 2019-04-19 PROCEDURE — 700111 HCHG RX REV CODE 636 W/ 250 OVERRIDE (IP): Performed by: STUDENT IN AN ORGANIZED HEALTH CARE EDUCATION/TRAINING PROGRAM

## 2019-04-19 PROCEDURE — A9270 NON-COVERED ITEM OR SERVICE: HCPCS | Performed by: STUDENT IN AN ORGANIZED HEALTH CARE EDUCATION/TRAINING PROGRAM

## 2019-04-19 PROCEDURE — 700102 HCHG RX REV CODE 250 W/ 637 OVERRIDE(OP): Performed by: STUDENT IN AN ORGANIZED HEALTH CARE EDUCATION/TRAINING PROGRAM

## 2019-04-19 PROCEDURE — 85027 COMPLETE CBC AUTOMATED: CPT

## 2019-04-19 PROCEDURE — 770020 HCHG ROOM/CARE - TELE (206)

## 2019-04-19 PROCEDURE — 84100 ASSAY OF PHOSPHORUS: CPT

## 2019-04-19 PROCEDURE — 36415 COLL VENOUS BLD VENIPUNCTURE: CPT

## 2019-04-19 PROCEDURE — C9113 INJ PANTOPRAZOLE SODIUM, VIA: HCPCS | Performed by: STUDENT IN AN ORGANIZED HEALTH CARE EDUCATION/TRAINING PROGRAM

## 2019-04-19 PROCEDURE — 99232 SBSQ HOSP IP/OBS MODERATE 35: CPT | Mod: GC | Performed by: HOSPITALIST

## 2019-04-19 PROCEDURE — 700101 HCHG RX REV CODE 250: Performed by: STUDENT IN AN ORGANIZED HEALTH CARE EDUCATION/TRAINING PROGRAM

## 2019-04-19 PROCEDURE — 80053 COMPREHEN METABOLIC PANEL: CPT

## 2019-04-19 RX ORDER — OMEPRAZOLE 20 MG/1
40 CAPSULE, DELAYED RELEASE ORAL DAILY
Status: DISCONTINUED | OUTPATIENT
Start: 2019-04-19 | End: 2019-04-21 | Stop reason: HOSPADM

## 2019-04-19 RX ADMIN — SPIRONOLACTONE 25 MG: 25 TABLET ORAL at 06:05

## 2019-04-19 RX ADMIN — OMEPRAZOLE 40 MG: 20 CAPSULE, DELAYED RELEASE ORAL at 16:12

## 2019-04-19 RX ADMIN — ACETAMINOPHEN 650 MG: 325 TABLET, FILM COATED ORAL at 06:09

## 2019-04-19 RX ADMIN — LORAZEPAM 1 MG: 1 TABLET ORAL at 14:53

## 2019-04-19 RX ADMIN — POTASSIUM CHLORIDE AND SODIUM CHLORIDE: 900; 150 INJECTION, SOLUTION INTRAVENOUS at 02:49

## 2019-04-19 RX ADMIN — THERA TABS 1 TABLET: TAB at 06:05

## 2019-04-19 RX ADMIN — LORAZEPAM 0.5 MG: 0.5 TABLET ORAL at 06:05

## 2019-04-19 RX ADMIN — LORAZEPAM 1 MG: 1 TABLET ORAL at 19:49

## 2019-04-19 RX ADMIN — LORAZEPAM 1 MG: 1 TABLET ORAL at 10:25

## 2019-04-19 RX ADMIN — LORAZEPAM 2 MG: 2 TABLET ORAL at 22:10

## 2019-04-19 RX ADMIN — LORAZEPAM 1 MG: 1 TABLET ORAL at 02:49

## 2019-04-19 RX ADMIN — FOLIC ACID 1 MG: 1 TABLET ORAL at 06:05

## 2019-04-19 RX ADMIN — PANTOPRAZOLE SODIUM 40 MG: 40 INJECTION, POWDER, LYOPHILIZED, FOR SOLUTION INTRAVENOUS at 06:05

## 2019-04-19 RX ADMIN — ACETAMINOPHEN 650 MG: 325 TABLET, FILM COATED ORAL at 19:48

## 2019-04-19 RX ADMIN — Medication 100 MG: at 06:05

## 2019-04-19 RX ADMIN — FAMOTIDINE 20 MG: 10 INJECTION INTRAVENOUS at 06:05

## 2019-04-19 RX ADMIN — LISINOPRIL 10 MG: 10 TABLET ORAL at 06:05

## 2019-04-19 RX ADMIN — POTASSIUM CHLORIDE AND SODIUM CHLORIDE: 900; 150 INJECTION, SOLUTION INTRAVENOUS at 12:57

## 2019-04-19 RX ADMIN — POTASSIUM CHLORIDE AND SODIUM CHLORIDE: 900; 150 INJECTION, SOLUTION INTRAVENOUS at 22:11

## 2019-04-19 ASSESSMENT — LIFESTYLE VARIABLES
NAUSEA AND VOMITING: MILD NAUSEA WITH NO VOMITING
TREMOR: TREMOR NOT VISIBLE BUT CAN BE FELT, FINGERTIP TO FINGERTIP
ANXIETY: *
ORIENTATION AND CLOUDING OF SENSORIUM: ORIENTED AND CAN DO SERIAL ADDITIONS
TREMOR: TREMOR NOT VISIBLE BUT CAN BE FELT, FINGERTIP TO FINGERTIP
NAUSEA AND VOMITING: MILD NAUSEA WITH NO VOMITING
VISUAL DISTURBANCES: NOT PRESENT
ORIENTATION AND CLOUDING OF SENSORIUM: ORIENTED AND CAN DO SERIAL ADDITIONS
PAROXYSMAL SWEATS: *
HEADACHE, FULLNESS IN HEAD: MILD
VISUAL DISTURBANCES: NOT PRESENT
AGITATION: *
HEADACHE, FULLNESS IN HEAD: MILD
TOTAL SCORE: 11
VISUAL DISTURBANCES: NOT PRESENT
VISUAL DISTURBANCES: NOT PRESENT
AUDITORY DISTURBANCES: NOT PRESENT
HEADACHE, FULLNESS IN HEAD: VERY MILD
ANXIETY: *
AGITATION: NORMAL ACTIVITY
TOTAL SCORE: 9
PAROXYSMAL SWEATS: BARELY PERCEPTIBLE SWEATING. PALMS MOIST
AUDITORY DISTURBANCES: NOT PRESENT
HEADACHE, FULLNESS IN HEAD: MILD
TOTAL SCORE: 5
VISUAL DISTURBANCES: NOT PRESENT
ANXIETY: *
AGITATION: NORMAL ACTIVITY
PAROXYSMAL SWEATS: *
AGITATION: NORMAL ACTIVITY
ORIENTATION AND CLOUDING OF SENSORIUM: ORIENTED AND CAN DO SERIAL ADDITIONS
AUDITORY DISTURBANCES: NOT PRESENT
ANXIETY: MILDLY ANXIOUS
HEADACHE, FULLNESS IN HEAD: MILD
HEADACHE, FULLNESS IN HEAD: MODERATE
AGITATION: NORMAL ACTIVITY
AGITATION: NORMAL ACTIVITY
TOTAL SCORE: VERY MILD ITCHING, PINS AND NEEDLES SENSATION, BURNING OR NUMBNESS
ORIENTATION AND CLOUDING OF SENSORIUM: ORIENTED AND CAN DO SERIAL ADDITIONS
ANXIETY: *
PAROXYSMAL SWEATS: BARELY PERCEPTIBLE SWEATING. PALMS MOIST
ORIENTATION AND CLOUDING OF SENSORIUM: ORIENTED AND CAN DO SERIAL ADDITIONS
TREMOR: TREMOR NOT VISIBLE BUT CAN BE FELT, FINGERTIP TO FINGERTIP
TREMOR: TREMOR NOT VISIBLE BUT CAN BE FELT, FINGERTIP TO FINGERTIP
TOTAL SCORE: 8
NAUSEA AND VOMITING: MILD NAUSEA WITH NO VOMITING
TREMOR: TREMOR NOT VISIBLE BUT CAN BE FELT, FINGERTIP TO FINGERTIP
AUDITORY DISTURBANCES: NOT PRESENT
NAUSEA AND VOMITING: MILD NAUSEA WITH NO VOMITING
TOTAL SCORE: 8
NAUSEA AND VOMITING: MILD NAUSEA WITH NO VOMITING
NAUSEA AND VOMITING: MILD NAUSEA WITH NO VOMITING
VISUAL DISTURBANCES: NOT PRESENT
TOTAL SCORE: 8
PAROXYSMAL SWEATS: *
TREMOR: TREMOR NOT VISIBLE BUT CAN BE FELT, FINGERTIP TO FINGERTIP
ORIENTATION AND CLOUDING OF SENSORIUM: ORIENTED AND CAN DO SERIAL ADDITIONS
PAROXYSMAL SWEATS: *
ANXIETY: *

## 2019-04-19 ASSESSMENT — ENCOUNTER SYMPTOMS
FOCAL WEAKNESS: 0
TINGLING: 1
SENSORY CHANGE: 1
WEAKNESS: 1
VOMITING: 0
TINGLING: 0
HEADACHES: 1
TREMORS: 1
CHILLS: 0
ABDOMINAL PAIN: 1
DIARRHEA: 0
SENSORY CHANGE: 0
FEVER: 0
HALLUCINATIONS: 0
SEIZURES: 0
NAUSEA: 0
CONSTIPATION: 0
PALPITATIONS: 0

## 2019-04-19 NOTE — PROGRESS NOTES
Assumed pt care at 0715.  Received report from london RN.  Assessment completed.  Pt AAOx4.  Pt has no comlaints of pain at this time.  No other s/s of discomfort or distress. Pt ambulates with standby assist.  Bed in lowest position, locked, and bed alarm is on.  Pt calls appropriately.  Treaded socks in place, call light within reach and staff numbers provided.  Pt needs met at this time.

## 2019-04-19 NOTE — CARE PLAN
Problem: Infection  Goal: Will remain free from infection  Outcome: PROGRESSING AS EXPECTED  Pt educated on importance of hand hygiene and verbalized understanding. Hand washing performed before and after patient contact. No other s/sx of infection noted.       Problem: Knowledge Deficit  Goal: Knowledge of disease process/condition, treatment plan, diagnostic tests, and medications will improve  Outcome: PROGRESSING AS EXPECTED  Pt educated on reason for hospitalization and treatment plan. Discussed medication management and disease process. Verbalized understanding.

## 2019-04-19 NOTE — PROGRESS NOTES
Internal Medicine Interval Note  Note Author: Fransisco Lopez M.D.     Name Ashleigh Marquis     1960   Age/Sex 58 y.o. female   MRN [id number]   Code Status Full     After 5PM or if no immediate response to page, please call for cross-coverage  Attending/Team: Thomas/Edwin See Patient List for primary contact information  Call [phone] to page    1st Call - Day Intern (R1):   Teresa 2nd Call - Day Sr. Resident (R2/R3):   Jessica         Reason for interval visit  (Principal Problem)     Alcohol withdrawal    Interval Problem Daily Status Update  (24 hours, problem oriented, brief subjective history, new lab/imaging data pertinent to that problem)     CIWA scores unchanged since yesterday, highest 14, 14.5 mg of Ativan given between yesterday and today.  Trending and replacing electrolytes.  No witnessed hematemesis, hemoglobin stable for multiple days.  Changed PPI from IV to PO.  We will continue to treat for alcohol withdrawal.    Review of Systems   Constitutional: Negative for chills and fever.   Cardiovascular: Negative for chest pain and palpitations.   Gastrointestinal: Positive for abdominal pain. Negative for constipation, diarrhea, nausea and vomiting.   Genitourinary: Positive for urgency.   Skin: Negative for itching and rash.   Neurological: Positive for tremors and headaches. Negative for tingling, sensory change, focal weakness and seizures.   Psychiatric/Behavioral: Negative for hallucinations.   All other systems reviewed and are negative.      Disposition/Barriers to discharge:   Continued alcohol withdrawal    Consultants/Specialty  Psychiatry     PCP: None      Quality Measures  Quality-Core Measures   Reviewed items::  Labs reviewed and Medications reviewed  Velásquez catheter::  No Velásquez  DVT prophylaxis - mechanical:  SCDs      Physical Exam       Vitals:    19 0000 19 0400 19 0731 19 1107   BP: 141/93 138/93 149/92 121/67   Pulse: 83 73 71 76    Resp: 17 16 16 16   Temp: 36.2 °C (97.2 °F) 36 °C (96.8 °F) 37.2 °C (99 °F) 37.4 °C (99.3 °F)   TempSrc: Temporal Temporal Temporal Temporal   SpO2: 96% 99% 97% 97%   Weight:       Height:         Body mass index is 33.15 kg/m². Weight: 82.2 kg (181 lb 3.5 oz)  Oxygen Therapy:  Pulse Oximetry: 97 %, O2 (LPM): 0, O2 Delivery: None (Room Air)    Physical Exam  Constitutional:   Alert. Not cooperative.  HENT:   Head: Normocephalic and atraumatic.   Mouth/Throat: Oropharynx is clear and moist.   Eyes: Pupils are equal, round, and reactive to light. Conjunctivae and EOM are normal.  No scleral icterus.   Neck: Neck supple. No tracheal deviation present.   Cardiovascular: Normal rate, regular rhythm and normal heart sounds.  Exam reveals no gallop and no friction rub.    No murmur heard.  Pulmonary/Chest: Effort normal. No respiratory distress. She has no wheezes.   Abdominal: Soft. No rebound or guarding.  Musculoskeletal: She exhibits no edema.   Skin: Skin is warm and dry.   Psychiatric: No hallucinations.  Neuro:  A&O x 4. CN 1-12 grossly intact.  No focal neuro deficits.  Vitals reviewed.      Assessment/Plan     * Alcohol withdrawal syndrome without complication (HCC)- (present on admission)   Assessment & Plan    Continued alcohol withdrawal. Awake, protecting airway. No seizures. Question of psychiatric disorder(s) as causative factor in alcohol abuse.    - omeprazole 40 mg daily  - CIWA  - thiamine, folate, MV  - IVF  - seizure precautions   - Psychiatry consulted, unfortunately patient not cooperative during assessment  - cessation counseling, provide rehab resources      Hematemesis- (present on admission)   Assessment & Plan    Patient reported 2 episodes before admission, not witnessed. None during hospital course. History of duodenitis and previous upper and lower GI bleed. Hemoglobin stable.    - omeprazole 40 mg daily  - Monitor CBC     Heme positive stool- (present on admission)   Assessment & Plan     "On exam by ED physician.    -Plan per \"hematemesis\" problem     History of suicide attempt- (present on admission)   Assessment & Plan    Denies suicidal ideation.    - Psychiatry consulted, unfortunately patient not cooperative during assessment     Bipolar 2 disorder (HCC)- (present on admission)   Assessment & Plan    - Psychiatry consulted, unfortunately patient not cooperative during assessment     PTSD (post-traumatic stress disorder)- (present on admission)   Assessment & Plan    - Psychiatry consulted, unfortunately patient not cooperative during assessment     Transaminitis- (present on admission)   Assessment & Plan    Likely due to EtOH abuse. Improved.    - Monitor chemistry  - Alcohol cessation counseling     Hypertension- (present on admission)   Assessment & Plan    SBP up to 160s.    - continue home lisinopril, Aldactone  - needs Primary Care follow up and recheck out of acute setting       "

## 2019-04-19 NOTE — NON-PROVIDER
Internal Medicine Medical Student Note  Attending: JUDE Guzman M.D.  Note Author: Dolores Brantley, Student    Name Ashleigh Marquis     1960   Age/Sex 58 y.o. female   MRN [id number]   Code Status FULL             Reason for interval visit  (Principal Problem)   Alcohol withdrawal syndrome without complication (HCC)    Interval Problem Daily Status Update  (problem status, last 24 hours, new history, new data )   No acute events overnight. CIWA scores have been 5-11 and have been trending down over the last 24 hours. She reports having a headache this morning, feeling weak, and feeling like she has pins and needles. She continues to complain of diffuse abdominal pain. No further episodes of emesis. Hgb and Hct are stable (11.1 and 36.7)    Review of Systems   Gastrointestinal: Positive for abdominal pain.   Genitourinary: Positive for urgency.   Neurological: Positive for tingling, sensory change, weakness and headaches.       Physical Exam     Vitals:  Vitals:    19 0000 19 0400 19 0731 19 1107   BP: 141/93 138/93 149/92 121/67   Pulse: 83 73 71 76   Resp: 17 16 16 16   Temp: 36.2 °C (97.2 °F) 36 °C (96.8 °F) 37.2 °C (99 °F) 37.4 °C (99.3 °F)   TempSrc: Temporal Temporal Temporal Temporal   SpO2: 96% 99% 97% 97%   Weight:       Height:             Physical Exam:  Physical Exam   Constitutional: She is oriented to person, place, and time and well-developed, well-nourished, and in no distress.   HENT:   Head: Normocephalic and atraumatic.   Eyes: Pupils are equal, round, and reactive to light. Conjunctivae and EOM are normal.   Cardiovascular: Normal rate and regular rhythm.    Pulmonary/Chest: Effort normal and breath sounds normal.   Abdominal: Soft. Bowel sounds are normal. She exhibits no distension. There is tenderness (mild, diffuse). There is no rebound and no guarding.   Musculoskeletal: She exhibits no edema.   Neurological: She is alert and oriented to person,  place, and time. No cranial nerve deficit.   Skin: Skin is warm and dry.     Recent Labs      04/17/19 0332 04/18/19   0242 04/19/19   0246   WBC  5.3  4.6*  4.6*   RBC  3.71*  3.62*  3.50*   HEMOGLOBIN  12.0  11.4*  11.1*   HEMATOCRIT  36.7*  36.6*  36.7*   MCV  98.9*  101.1*  104.9*   MCH  32.3  31.5  31.7   RDW  58.4*  59.6*  62.1*   PLATELETCT  141*  168  193   MPV  11.4  10.4  10.5   NEUTSPOLYS  49.50  49.90   --    LYMPHOCYTES  32.80  32.80   --    MONOCYTES  14.30*  14.20*   --    EOSINOPHILS  1.90  1.80   --    BASOPHILS  1.10  1.10   --      Recent Labs      04/17/19 0332 04/18/19   0242  04/19/19   0246   SODIUM  140  135  142   POTASSIUM  3.7  3.4*  4.0   CHLORIDE  110  107  114*   CO2  22  23  20   GLUCOSE  108*  124*  118*   BUN  10  12  12     Recent Labs      04/17/19 0332 04/18/19   0242  04/19/19   0246   ALBUMIN  3.1*  3.2  3.1*   TBILIRUBIN  0.3  0.3  0.2   ALKPHOSPHAT  135*  137*  127*   TOTPROTEIN  5.8*  5.8*  5.8*   ALTSGPT  28  34  36   ASTSGOT  53*  58*  61*   CREATININE  0.66  0.54  0.62     Recent Labs      04/17/19 0332 04/18/19 0242 04/19/19   0246   ASTSGOT  53*  58*  61*   ALTSGPT  28  34  36   TBILIRUBIN  0.3  0.3  0.2   ALKPHOSPHAT  135*  137*  127*   GLOBULIN  2.7  2.6  2.7         Recent Labs      04/17/19 0332 04/18/19   0242  04/19/19   0246   SODIUM  140  135  142   POTASSIUM  3.7  3.4*  4.0   CHLORIDE  110  107  114*   CO2  22  23  20   GLUCOSE  108*  124*  118*   BUN  10  12  12   CREATININE  0.66  0.54  0.62           No intake or output data in the 24 hours ending 04/19/19 1248        Assessment/Plan     # GI Bleed  Patient has a long history of alcohol abuse. Previously admitted to the ICU in October 2018 for GI bleeding. She was found to have multiple ulcers in the body and antrum of the stomach on EGD. She was perscribed omeprazole and carafate at time of discharge but unclear if she has been taking them. At time of admission vital signs are stable with  no fever, tachycardia, or severe hypotension.  - Hgb 11.4 and Hct 36.5 at time of admission              - Hgb was 14.3 at last ED visit 4/10/18  - Stool was guaiac positive in the ED  - Hgb stable now 11.4. Hct 36.6     Plan:  - Decrease IVF to 100 mL/hr   - Mechanical soft diet   - Trend H&H  - Continue Protonix 40 mg IV Q12h  - Pepcid 20 mg IV BID  - Consider GI consult if she continues to bleed or becomes hemodynamically unstable        # Acute Alcohol Intoxication  # Alcohol Abuse  Patient brought in by EMS from [facility] with complaints of hematemesis x2 and was acutely intoxicated. BAL on admission was 0.21. She has a long standing history of alcohol abuse and has been seen in the ED for intoxication numerous times.  - CIWA scores have been 5-11 and are now trending down     Plan:  - Ativan PRN for agitation  - Continue thiamine, folate  - Decrease IVF to NS with 20 mEq KCl at 100 mL/hr  - Continue CIWA protocol     # Alcoholic gastritis  Diagnosed on a previous admission. EGD at that time showed multiple ulcers in the antrum and body of the stomach. Patient was discharged with omeprazole and carafate but it is unclear if she ever took them. She continues to drink heavily.     Plan:  - Trend H&H  - Protonix 40 mg IV q12h  - Pepcid 20 mg IV BID     # Transaminitis  - On admission AST 62, ALT 30, Lipase 89  - Patient reports diffuse abdominal pain, could indicate early pancreatitis  - Likely due to alcohol abuse  - Stable AST 61, ALT 36,      Plan  - Continue to monitor  - Repeat CMP in am     # Hypertension  - Patient previously diagnosed  - Prescribed lisinopril and aldactone  - BP on admission was 80s-100s/50s-60s     Plan:  - Continue home lisinopril and aldactone     # History of Bipolar II  # History of Suicide attempt  - Patient has a history of bipolar II and depression  - history of suicide attempt by overdose  - Not currently on psychiatric medication     Plan:  - Psych consulted, patient  uncooperative with interview        PROBLEM LIST:  Active Hospital Problems    Diagnosis   • Hematemesis [K92.0]     Priority: High   • Alcohol withdrawal syndrome without complication (HCC) [F10.230]     Priority: High   • Alcohol abuse [F10.10]     Priority: High   • Heme positive stool [R19.5]     Priority: Medium   • Moderate major depression (HCC) [F32.1]     Priority: Medium   • Duodenitis without bleeding [K29.80]     Priority: Medium   • History of suicide attempt [Z91.5]     Priority: Medium   • PTSD (post-traumatic stress disorder) [F43.10]     Priority: Medium   • Bipolar 2 disorder (HCC) [F31.81]     Priority: Medium   • Transaminitis [R74.0]     Priority: Low   • Hypertension [I10]     Priority: Low         #Core Measures   VTE PPx: SCDs  Abx: none  Lines/Tubes: peripheral IV left AC  Fluids NS 20 mEq KCl 100 mL/hr  Diet: Mechanical Soft diet as tolerated  Code Status: FULL    Dispo: Inpatient

## 2019-04-20 LAB
ALBUMIN SERPL BCP-MCNC: 3.3 G/DL (ref 3.2–4.9)
BUN SERPL-MCNC: 11 MG/DL (ref 8–22)
CALCIUM SERPL-MCNC: 8.7 MG/DL (ref 8.5–10.5)
CHLORIDE SERPL-SCNC: 112 MMOL/L (ref 96–112)
CO2 SERPL-SCNC: 20 MMOL/L (ref 20–33)
CREAT SERPL-MCNC: 0.56 MG/DL (ref 0.5–1.4)
GLUCOSE SERPL-MCNC: 98 MG/DL (ref 65–99)
MAGNESIUM SERPL-MCNC: 1.6 MG/DL (ref 1.5–2.5)
PHOSPHATE SERPL-MCNC: 3.8 MG/DL (ref 2.5–4.5)
POTASSIUM SERPL-SCNC: 4.5 MMOL/L (ref 3.6–5.5)
SODIUM SERPL-SCNC: 140 MMOL/L (ref 135–145)

## 2019-04-20 PROCEDURE — 700111 HCHG RX REV CODE 636 W/ 250 OVERRIDE (IP): Performed by: STUDENT IN AN ORGANIZED HEALTH CARE EDUCATION/TRAINING PROGRAM

## 2019-04-20 PROCEDURE — 36415 COLL VENOUS BLD VENIPUNCTURE: CPT

## 2019-04-20 PROCEDURE — 99232 SBSQ HOSP IP/OBS MODERATE 35: CPT | Mod: GC | Performed by: HOSPITALIST

## 2019-04-20 PROCEDURE — 700101 HCHG RX REV CODE 250: Performed by: STUDENT IN AN ORGANIZED HEALTH CARE EDUCATION/TRAINING PROGRAM

## 2019-04-20 PROCEDURE — 700102 HCHG RX REV CODE 250 W/ 637 OVERRIDE(OP): Performed by: STUDENT IN AN ORGANIZED HEALTH CARE EDUCATION/TRAINING PROGRAM

## 2019-04-20 PROCEDURE — 80069 RENAL FUNCTION PANEL: CPT

## 2019-04-20 PROCEDURE — 306588 SLEEVE,VASO CALF MED: Performed by: HOSPITALIST

## 2019-04-20 PROCEDURE — A9270 NON-COVERED ITEM OR SERVICE: HCPCS | Performed by: STUDENT IN AN ORGANIZED HEALTH CARE EDUCATION/TRAINING PROGRAM

## 2019-04-20 PROCEDURE — 83735 ASSAY OF MAGNESIUM: CPT

## 2019-04-20 PROCEDURE — 770020 HCHG ROOM/CARE - TELE (206)

## 2019-04-20 RX ORDER — MAGNESIUM SULFATE HEPTAHYDRATE 40 MG/ML
2 INJECTION, SOLUTION INTRAVENOUS ONCE
Status: COMPLETED | OUTPATIENT
Start: 2019-04-20 | End: 2019-04-20

## 2019-04-20 RX ADMIN — POTASSIUM CHLORIDE AND SODIUM CHLORIDE: 900; 150 INJECTION, SOLUTION INTRAVENOUS at 07:47

## 2019-04-20 RX ADMIN — LORAZEPAM 0.5 MG: 0.5 TABLET ORAL at 20:06

## 2019-04-20 RX ADMIN — OMEPRAZOLE 40 MG: 20 CAPSULE, DELAYED RELEASE ORAL at 05:07

## 2019-04-20 RX ADMIN — Medication 100 MG: at 05:07

## 2019-04-20 RX ADMIN — LORAZEPAM 1 MG: 1 TABLET ORAL at 16:41

## 2019-04-20 RX ADMIN — ACETAMINOPHEN 650 MG: 325 TABLET, FILM COATED ORAL at 20:06

## 2019-04-20 RX ADMIN — POTASSIUM CHLORIDE AND SODIUM CHLORIDE: 900; 150 INJECTION, SOLUTION INTRAVENOUS at 23:58

## 2019-04-20 RX ADMIN — LORAZEPAM 1 MG: 1 TABLET ORAL at 12:04

## 2019-04-20 RX ADMIN — THERA TABS 1 TABLET: TAB at 05:08

## 2019-04-20 RX ADMIN — MAGNESIUM SULFATE IN WATER 2 G: 40 INJECTION, SOLUTION INTRAVENOUS at 11:19

## 2019-04-20 RX ADMIN — LORAZEPAM 1 MG: 1 TABLET ORAL at 07:54

## 2019-04-20 RX ADMIN — LISINOPRIL 10 MG: 10 TABLET ORAL at 05:07

## 2019-04-20 RX ADMIN — SPIRONOLACTONE 25 MG: 25 TABLET ORAL at 05:07

## 2019-04-20 RX ADMIN — LORAZEPAM 1 MG: 1 TABLET ORAL at 04:23

## 2019-04-20 RX ADMIN — ACETAMINOPHEN 650 MG: 325 TABLET, FILM COATED ORAL at 11:18

## 2019-04-20 RX ADMIN — FOLIC ACID 1 MG: 1 TABLET ORAL at 05:08

## 2019-04-20 RX ADMIN — ACETAMINOPHEN 650 MG: 325 TABLET, FILM COATED ORAL at 04:23

## 2019-04-20 ASSESSMENT — LIFESTYLE VARIABLES
TREMOR: TREMOR NOT VISIBLE BUT CAN BE FELT, FINGERTIP TO FINGERTIP
ANXIETY: *
TOTAL SCORE: 8
VISUAL DISTURBANCES: NOT PRESENT
AGITATION: SOMEWHAT MORE THAN NORMAL ACTIVITY
VISUAL DISTURBANCES: NOT PRESENT
AUDITORY DISTURBANCES: NOT PRESENT
HEADACHE, FULLNESS IN HEAD: VERY MILD
TOTAL SCORE: 4
ANXIETY: *
AUDITORY DISTURBANCES: NOT PRESENT
TREMOR: TREMOR NOT VISIBLE BUT CAN BE FELT, FINGERTIP TO FINGERTIP
NAUSEA AND VOMITING: MILD NAUSEA WITH NO VOMITING
TREMOR: TREMOR NOT VISIBLE BUT CAN BE FELT, FINGERTIP TO FINGERTIP
ORIENTATION AND CLOUDING OF SENSORIUM: ORIENTED AND CAN DO SERIAL ADDITIONS
AGITATION: NORMAL ACTIVITY
AUDITORY DISTURBANCES: NOT PRESENT
AUDITORY DISTURBANCES: NOT PRESENT
PAROXYSMAL SWEATS: BARELY PERCEPTIBLE SWEATING. PALMS MOIST
TREMOR: TREMOR NOT VISIBLE BUT CAN BE FELT, FINGERTIP TO FINGERTIP
AUDITORY DISTURBANCES: NOT PRESENT
NAUSEA AND VOMITING: MILD NAUSEA WITH NO VOMITING
PAROXYSMAL SWEATS: BARELY PERCEPTIBLE SWEATING. PALMS MOIST
ANXIETY: *
NAUSEA AND VOMITING: MILD NAUSEA WITH NO VOMITING
TOTAL SCORE: 9
PAROXYSMAL SWEATS: NO SWEAT VISIBLE
TOTAL SCORE: 7
VISUAL DISTURBANCES: NOT PRESENT
ANXIETY: MILDLY ANXIOUS
AGITATION: SOMEWHAT MORE THAN NORMAL ACTIVITY
TREMOR: TREMOR NOT VISIBLE BUT CAN BE FELT, FINGERTIP TO FINGERTIP
TREMOR: TREMOR NOT VISIBLE BUT CAN BE FELT, FINGERTIP TO FINGERTIP
ORIENTATION AND CLOUDING OF SENSORIUM: ORIENTED AND CAN DO SERIAL ADDITIONS
AGITATION: SOMEWHAT MORE THAN NORMAL ACTIVITY
ANXIETY: *
PAROXYSMAL SWEATS: BARELY PERCEPTIBLE SWEATING. PALMS MOIST
PAROXYSMAL SWEATS: BARELY PERCEPTIBLE SWEATING. PALMS MOIST
PAROXYSMAL SWEATS: NO SWEAT VISIBLE
ORIENTATION AND CLOUDING OF SENSORIUM: ORIENTED AND CAN DO SERIAL ADDITIONS
NAUSEA AND VOMITING: MILD NAUSEA WITH NO VOMITING
VISUAL DISTURBANCES: NOT PRESENT
AGITATION: NORMAL ACTIVITY
VISUAL DISTURBANCES: NOT PRESENT
ORIENTATION AND CLOUDING OF SENSORIUM: ORIENTED AND CAN DO SERIAL ADDITIONS
TOTAL SCORE: 9
NAUSEA AND VOMITING: MILD NAUSEA WITH NO VOMITING
HEADACHE, FULLNESS IN HEAD: MILD
ORIENTATION AND CLOUDING OF SENSORIUM: ORIENTED AND CAN DO SERIAL ADDITIONS
AUDITORY DISTURBANCES: NOT PRESENT
ANXIETY: *
ORIENTATION AND CLOUDING OF SENSORIUM: ORIENTED AND CAN DO SERIAL ADDITIONS
TOTAL SCORE: 9
NAUSEA AND VOMITING: MILD NAUSEA WITH NO VOMITING
HEADACHE, FULLNESS IN HEAD: MODERATE
HEADACHE, FULLNESS IN HEAD: MILD
VISUAL DISTURBANCES: NOT PRESENT
HEADACHE, FULLNESS IN HEAD: MILD
HEADACHE, FULLNESS IN HEAD: MILD
AGITATION: SOMEWHAT MORE THAN NORMAL ACTIVITY

## 2019-04-20 ASSESSMENT — ENCOUNTER SYMPTOMS
TINGLING: 0
SHORTNESS OF BREATH: 0
MYALGIAS: 1
NAUSEA: 0
FEVER: 0
HEADACHES: 1
DIARRHEA: 0
TREMORS: 1
COUGH: 0
FOCAL WEAKNESS: 0
HALLUCINATIONS: 0
BLURRED VISION: 0
SENSORY CHANGE: 0
CONSTIPATION: 0
CHILLS: 0
VOMITING: 0
PALPITATIONS: 0
ABDOMINAL PAIN: 1
SEIZURES: 0
DOUBLE VISION: 0

## 2019-04-20 NOTE — NON-PROVIDER
Internal Medicine Medical Student Note  Attending: JUDE Guzman M.D.  Note Author: Dolores Brantley, Student    Name Ashleigh Marquis     1960   Age/Sex 58 y.o. female   MRN [id number]   Code Status Full             Reason for interval visit  (Principal Problem)   Alcohol withdrawal syndrome without complication (HCC)    Interval Problem Daily Status Update  (problem status, last 24 hours, new history, new data )   No acute events overnight. CIWA scores 4-1. She continues to report headache, body aches, abdominal pain and tremors. She continues to be uncooperative.    Review of Systems   Constitutional: Negative for chills and fever.   Eyes: Negative for blurred vision and double vision.   Respiratory: Negative for cough and shortness of breath.    Cardiovascular: Negative for chest pain.   Gastrointestinal: Positive for abdominal pain. Negative for nausea and vomiting.   Musculoskeletal: Positive for myalgias.   Skin: Negative for itching and rash.   Neurological: Positive for tremors and headaches.       Physical Exam     Vitals:  Vitals:    19 0000 19 0400 19 0753 19 1159   BP: 141/89 137/76 154/102 145/90   Pulse: 76 70 69 75   Resp: 17 18 14 14   Temp: 36.7 °C (98 °F) 36 °C (96.8 °F) 36.6 °C (97.8 °F) 36.5 °C (97.7 °F)   TempSrc: Temporal Temporal Temporal Temporal   SpO2: 96% 97% 95% 98%   Weight:       Height:             Physical Exam:  Physical Exam   Constitutional: She is oriented to person, place, and time and well-developed, well-nourished, and in no distress.   HENT:   Head: Normocephalic and atraumatic.   Eyes: Pupils are equal, round, and reactive to light. Conjunctivae and EOM are normal.   Cardiovascular: Normal rate and regular rhythm.    Pulmonary/Chest: Effort normal and breath sounds normal.   Abdominal: Soft. Bowel sounds are normal. There is tenderness (mild diffuse tenderness to palpation).   Musculoskeletal: She exhibits no edema.    Neurological: She is alert and oriented to person, place, and time. No cranial nerve deficit.   Skin: Skin is warm and dry.     Recent Labs      04/18/19 0242 04/19/19 0246   WBC  4.6*  4.6*   RBC  3.62*  3.50*   HEMOGLOBIN  11.4*  11.1*   HEMATOCRIT  36.6*  36.7*   MCV  101.1*  104.9*   MCH  31.5  31.7   RDW  59.6*  62.1*   PLATELETCT  168  193   MPV  10.4  10.5   NEUTSPOLYS  49.90   --    LYMPHOCYTES  32.80   --    MONOCYTES  14.20*   --    EOSINOPHILS  1.80   --    BASOPHILS  1.10   --      Recent Labs      04/18/19 0242 04/19/19 0246 04/20/19   0412   SODIUM  135  142  140   POTASSIUM  3.4*  4.0  4.5   CHLORIDE  107  114*  112   CO2  23  20  20   GLUCOSE  124*  118*  98   BUN  12  12  11     Recent Labs      04/18/19 0242 04/19/19 0246 04/20/19   0412   ALBUMIN  3.2  3.1*  3.3   TBILIRUBIN  0.3  0.2   --    ALKPHOSPHAT  137*  127*   --    TOTPROTEIN  5.8*  5.8*   --    ALTSGPT  34  36   --    ASTSGOT  58*  61*   --    CREATININE  0.54  0.62  0.56     Recent Labs      04/18/19 0242  04/19/19 0246   ASTSGOT  58*  61*   ALTSGPT  34  36   TBILIRUBIN  0.3  0.2   ALKPHOSPHAT  137*  127*   GLOBULIN  2.6  2.7         Recent Labs      04/18/19 0242 04/19/19 0246  04/20/19   0412   SODIUM  135  142  140   POTASSIUM  3.4*  4.0  4.5   CHLORIDE  107  114*  112   CO2  23  20  20   GLUCOSE  124*  118*  98   BUN  12  12  11   CREATININE  0.54  0.62  0.56           No intake or output data in the 24 hours ending 04/20/19 1200        Assessment/Plan     # GI Bleed  Patient has a long history of alcohol abuse. Previously admitted to the ICU in October 2018 for GI bleeding. She was found to have multiple ulcers in the body and antrum of the stomach on EGD. She was perscribed omeprazole and carafate at time of discharge but unclear if she has been taking them. At time of admission vital signs are stable with no fever, tachycardia, or severe hypotension.  - Hgb 11.4 and Hct 36.5 at time of  admission              - Hgb was 14.3 at last ED visit 4/10/18  - Stool was guaiac positive in the ED  - H&H stable     Plan:  - IVF  - Mechanical soft diet   - Omeprazole 40 mg daily  - Consider GI consult if she continues to bleed or becomes hemodynamically unstable        # Acute Alcohol Intoxication  # Alcohol Abuse  Patient brought in by EMS from [facility] with complaints of hematemesis x2 and was acutely intoxicated. BAL on admission was 0.21. She has a long standing history of alcohol abuse and has been seen in the ED for intoxication numerous times.  - CIWA scores have been 4-11     Plan:  - Ativan PRN for agitation  - Continue thiamine, folate  - IVF to NS with 20 mEq KCl at 100 mL/hr  - Continue CIWA protocol     # Alcoholic gastritis  Diagnosed on a previous admission. EGD at that time showed multiple ulcers in the antrum and body of the stomach. Patient was discharged with omeprazole and carafate but it is unclear if she ever took them. She continues to drink heavily.     Plan:  - Trend H&H  - Omeprazole 40 mg daily    # Transaminitis  - On admission AST 62, ALT 30, Lipase 89  - Patient reports diffuse abdominal pain, could indicate early pancreatitis  - Likely due to alcohol abuse  - Stable AST 61, ALT 36,      Plan  - Continue to monitor  - Repeat CMP in am     # Hypertension  - Patient previously diagnosed  - Prescribed lisinopril and aldactone  - BP on admission was 80s-100s/50s-60s     Plan:  - Continue home lisinopril and aldactone     # History of Bipolar II  # History of Suicide attempt  - Patient has a history of bipolar II and depression  - history of suicide attempt by overdose  - Not currently on psychiatric medication     Plan:  - Psych consulted, patient uncooperative with interview        PROBLEM LIST:  Active Hospital Problems    Diagnosis   • Hematemesis [K92.0]     Priority: High   • Alcohol withdrawal syndrome without complication (HCC) [F10.230]     Priority: High   • Heme  positive stool [R19.5]     Priority: Medium   • Moderate major depression (HCC) [F32.1]     Priority: Medium   • Duodenitis without bleeding [K29.80]     Priority: Medium   • History of suicide attempt [Z91.5]     Priority: Medium   • PTSD (post-traumatic stress disorder) [F43.10]     Priority: Medium   • Bipolar 2 disorder (HCC) [F31.81]     Priority: Medium   • Transaminitis [R74.0]     Priority: Low   • Hypertension [I10]     Priority: Low   • Hypomagnesemia [E83.42]         #Core Measures   VTE PPx: SCDs  Abx: none  Lines/Tubes: peripheral IV left AC  Fluids NS 20 mEq KCl 100 mL/hr  Diet: mechanical soft diet as tolerated  Code Status: full    Dispo: Inpatient

## 2019-04-20 NOTE — PROGRESS NOTES
Received bedside report. Patient alert and oriented. Complaints of pain states tylenol is not enough, MD at bedside. No overnight events. Call light within reach

## 2019-04-20 NOTE — PROGRESS NOTES
Bedside report received, patient care assumed. Pt is A&Ox4, VSS, assessment complete. Tele box in place. C/o mild headache pain at this time, medicated per MAR. POC reviewed and questions answered. Bed in locked and low position, fall precautions in place. Call light in reach. Educated to call for assistance.

## 2019-04-20 NOTE — PROGRESS NOTES
Internal Medicine Interval Note  Note Author: Vic Moore M.D.     Name Ashleigh Marquis     1960   Age/Sex 58 y.o. female   MRN [id number]   Code Status Full     After 5PM or if no immediate response to page, please call for cross-coverage  Attending/Team: Thomas/Edwin See Patient List for primary contact information  Call [phone] to page    1st Call - Day Intern (R1):   Teresa 2nd Call - Day Sr. Resident (R2/R3):   Jessica         Reason for interval visit  (Principal Problem)     Alcohol withdrawal    Interval Problem Daily Status Update  (24 hours, problem oriented, brief subjective history, new lab/imaging data pertinent to that problem)     - CIWA scores 4-11  - continues to have tremors and headache  - continues to be non-cooperative    Review of Systems   Constitutional: Negative for chills and fever.   Cardiovascular: Negative for chest pain and palpitations.   Gastrointestinal: Positive for abdominal pain. Negative for constipation, diarrhea, nausea and vomiting.   Genitourinary: Positive for urgency.   Skin: Negative for itching and rash.   Neurological: Positive for tremors and headaches. Negative for tingling, sensory change, focal weakness and seizures.   Psychiatric/Behavioral: Negative for hallucinations.   All other systems reviewed and are negative.      Disposition/Barriers to discharge:   Continued alcohol withdrawal    Consultants/Specialty  Psychiatry     PCP: None      Quality Measures  Quality-Core Measures   Reviewed items::  Labs reviewed and Medications reviewed  Velásquez catheter::  No Velásquez  DVT prophylaxis - mechanical:  SCDs      Physical Exam       Vitals:    19 2000 19 0000 19 0400 19 0753   BP: 145/100 141/89 137/76 154/102   Pulse: 82 76 70 69   Resp: 18 17 18 14   Temp: 36.1 °C (96.9 °F) 36.7 °C (98 °F) 36 °C (96.8 °F) 36.6 °C (97.8 °F)   TempSrc: Temporal Temporal Temporal Temporal   SpO2: 98% 96% 97% 95%   Weight: 83.2 kg (183  "lb 6.8 oz)      Height:         Body mass index is 33.55 kg/m². Weight: 83.2 kg (183 lb 6.8 oz)  Oxygen Therapy:  Pulse Oximetry: 95 %, O2 (LPM): 0, O2 Delivery: None (Room Air)    Physical Exam  Constitutional:   Alert. Not cooperative.  HENT:   Head: Normocephalic and atraumatic.   Mouth/Throat: Oropharynx is clear and moist.   Eyes: Pupils are equal, round, and reactive to light. Conjunctivae and EOM are normal.  No scleral icterus.   Neck: Neck supple. No tracheal deviation present.   Cardiovascular: Normal rate, regular rhythm and normal heart sounds.  Exam reveals no gallop and no friction rub.    No murmur heard.  Pulmonary/Chest: Effort normal. No respiratory distress. She has no wheezes.   Abdominal: Soft. No rebound or guarding.  Musculoskeletal: She exhibits no edema.   Skin: Skin is warm and dry.   Psychiatric: No hallucinations.  Neuro:  A&O x 4. CN 1-12 grossly intact.  No focal neuro deficits.  Vitals reviewed.      Assessment/Plan     * Alcohol withdrawal syndrome without complication (HCC)- (present on admission)   Assessment & Plan    Continued alcohol withdrawal. Awake, protecting airway. No seizures. Question of psychiatric disorder(s) as causative factor in alcohol abuse.    - omeprazole 40 mg daily  - CIWA  - thiamine, folate, MV  - IVF  - seizure precautions, CTM  - Psychiatry consulted, unfortunately patient not cooperative during assessment  - cessation counseling, provide rehab resources      Hematemesis- (present on admission)   Assessment & Plan    Patient reported 2 episodes before admission, not witnessed. None during hospital course. History of duodenitis and previous upper and lower GI bleed. Hemoglobin stable.    - omeprazole 40 mg daily  - Monitor CBC     Heme positive stool- (present on admission)   Assessment & Plan    On exam by ED physician.    -Plan per \"hematemesis\" problem     Moderate major depression (HCC)- (present on admission)   Assessment & Plan    - Psychiatry " consulted, unfortunately patient not cooperative during assessment     Duodenitis without bleeding- (present on admission)   Assessment & Plan    - continue omeprazole 40 mg daily     History of suicide attempt- (present on admission)   Assessment & Plan    Denies suicidal ideation.    - Psychiatry consulted, unfortunately patient not cooperative during assessment     Bipolar 2 disorder (HCC)- (present on admission)   Assessment & Plan    - Psychiatry consulted, unfortunately patient not cooperative during assessment     PTSD (post-traumatic stress disorder)- (present on admission)   Assessment & Plan    - Psychiatry consulted, unfortunately patient not cooperative during assessment     Hypomagnesemia- (present on admission)   Assessment & Plan    - monitor and replete as necessary, goal >2     Transaminitis- (present on admission)   Assessment & Plan    Likely due to EtOH abuse. Improved.    - Monitor chemistry  - Alcohol cessation counseling     Hypertension- (present on admission)   Assessment & Plan    - continue home lisinopril, Aldactone  - needs Primary Care follow up and recheck out of acute setting

## 2019-04-20 NOTE — CARE PLAN
Problem: Communication  Goal: The ability to communicate needs accurately and effectively will improve  Outcome: PROGRESSING AS EXPECTED  POC reviewed and all questions answered. Pt verbalized understanding of treatment and medications. Calls appropriately with needs and asks questions regarding care.       Problem: Psychosocial Needs:  Goal: Level of anxiety will decrease  Outcome: PROGRESSING AS EXPECTED  Discussed coping mechanisms with pt and ways to manage stress. Identified anxiety triggers and ways that work best to decrease anxiety. Pt has been cooperative and discussed feelings with staff this shift. Verbalized understanding of healthy coping mechanisms such as deep breathing.

## 2019-04-20 NOTE — DISCHARGE SUMMARY
Internal Medicine Discharge Summary  Note Author: Fransisco Lopez M.D.       Name Ashleigh Marquis     1960   Age/Sex 58 y.o. female   MRN [id number]         Admit Date:  2019       Discharge Date:   2019 - Patient left AMA    Service:   [facility] [place] team  Attending Physician(s):   Dr. Guzman       Senior Resident(s):   Dr. Lopez  [place] Resident(s):   Dr. Moore  PCP: Pcp Pt States None      Primary Diagnosis:     Alcohol withdrawal syndrome without complication (HCC) POA: Yes    Secondary Diagnoses:                Principal Problem:    Alcohol withdrawal syndrome without complication (HCC) POA: Yes  Active Problems:    Hematemesis POA: Yes    PTSD (post-traumatic stress disorder) POA: Yes    Bipolar 2 disorder (HCC) POA: Yes    History of suicide attempt POA: Yes    Duodenitis without bleeding POA: Yes    Moderate major depression (HCC) POA: Yes    Heme positive stool POA: Yes    Hypertension POA: Yes    Transaminitis POA: Yes    Hypomagnesemia POA: Yes  Resolved Problems:    * No resolved hospital problems. *      Hospital Summary (Brief Narrative):       58-year-old female with a history of alcohol use disorder, bipolar 2 disorder, PTSD, peptic ulcer disease seen on EGD 10/3/2018, ureterolithiasis, presented for hematemesis and abdominal pain.  Patient stated she had hematemesis twice, however none was witnessed during her hospital course.  She was started on intravenous proton pump inhibitor which was changed to oral as her hemoglobin remained stable and she did not require blood transfusion.  She was treated for alcohol withdrawal, without signs of losing airway protection or seizures.  Psychiatry was consulted due to concern of underlying psychiatric disorders contributing to her alcohol use, however patient refused to cooperate for assessment.  Patient was improving, and CIWA protocol was discontinued.  Ativan 0.5 mg Q12H PRN was started, as patient had only  "received 0.5 mg over the last 12 hours with improving CIWA scores.  Patient became aggressive and left AMA, before I was able to assess her or speak with her.    Patient /Hospital Summary (Details -- Problem Oriented) :          Hematemesis   Assessment & Plan    Patient reported 2 episodes before admission, not witnessed. None during hospital course. History of duodenitis and previous upper and lower GI bleed. Hemoglobin stable.    - omeprazole 40 mg daily  - Monitor CBC     * Alcohol withdrawal syndrome without complication (HCC)   Assessment & Plan    Continued alcohol withdrawal. Awake, protecting airway. No seizures. Question of psychiatric disorder(s) as causative factor in alcohol abuse.    - omeprazole 40 mg daily  - CIWA  - thiamine, folate, MV  - IVF  - seizure precautions, CTM  - Psychiatry consulted, unfortunately patient not cooperative during assessment  - cessation counseling, provide rehab resources      Heme positive stool   Assessment & Plan    On exam by ED physician.    -Plan per \"hematemesis\" problem     Moderate major depression (HCC)   Assessment & Plan    - Psychiatry consulted, unfortunately patient not cooperative during assessment     Duodenitis without bleeding   Assessment & Plan    - continue omeprazole 40 mg daily     History of suicide attempt   Assessment & Plan    Denies suicidal ideation.    - Psychiatry consulted, unfortunately patient not cooperative during assessment     Bipolar 2 disorder (HCC)   Assessment & Plan    - Psychiatry consulted, unfortunately patient not cooperative during assessment     PTSD (post-traumatic stress disorder)   Assessment & Plan    - Psychiatry consulted, unfortunately patient not cooperative during assessment     Hypomagnesemia   Assessment & Plan    - monitor and replete as necessary, goal >2     Transaminitis   Assessment & Plan    Likely due to EtOH abuse. Improved.    - Monitor chemistry  - Alcohol cessation counseling     Hypertension "   Assessment & Plan    - continue home lisinopril, Aldactone  - needs Primary Care follow up and recheck out of acute setting         Consultants:     Psychiatry Dr. Restrepo    Procedures:        None    Imaging/ Testing:      No orders to display

## 2019-04-21 VITALS
BODY MASS INDEX: 33.31 KG/M2 | SYSTOLIC BLOOD PRESSURE: 143 MMHG | TEMPERATURE: 97.3 F | DIASTOLIC BLOOD PRESSURE: 98 MMHG | WEIGHT: 181 LBS | HEIGHT: 62 IN | HEART RATE: 78 BPM | RESPIRATION RATE: 15 BRPM | OXYGEN SATURATION: 97 %

## 2019-04-21 LAB
ALBUMIN SERPL BCP-MCNC: 3.2 G/DL (ref 3.2–4.9)
BUN SERPL-MCNC: 15 MG/DL (ref 8–22)
CALCIUM SERPL-MCNC: 8.7 MG/DL (ref 8.5–10.5)
CHLORIDE SERPL-SCNC: 110 MMOL/L (ref 96–112)
CO2 SERPL-SCNC: 22 MMOL/L (ref 20–33)
CREAT SERPL-MCNC: 0.59 MG/DL (ref 0.5–1.4)
GLUCOSE SERPL-MCNC: 106 MG/DL (ref 65–99)
MAGNESIUM SERPL-MCNC: 1.6 MG/DL (ref 1.5–2.5)
PHOSPHATE SERPL-MCNC: 4.3 MG/DL (ref 2.5–4.5)
POTASSIUM SERPL-SCNC: 4.1 MMOL/L (ref 3.6–5.5)
SODIUM SERPL-SCNC: 139 MMOL/L (ref 135–145)

## 2019-04-21 PROCEDURE — 700102 HCHG RX REV CODE 250 W/ 637 OVERRIDE(OP): Performed by: STUDENT IN AN ORGANIZED HEALTH CARE EDUCATION/TRAINING PROGRAM

## 2019-04-21 PROCEDURE — 83735 ASSAY OF MAGNESIUM: CPT

## 2019-04-21 PROCEDURE — 99238 HOSP IP/OBS DSCHRG MGMT 30/<: CPT | Mod: GC | Performed by: HOSPITALIST

## 2019-04-21 PROCEDURE — A9270 NON-COVERED ITEM OR SERVICE: HCPCS | Performed by: STUDENT IN AN ORGANIZED HEALTH CARE EDUCATION/TRAINING PROGRAM

## 2019-04-21 PROCEDURE — 700111 HCHG RX REV CODE 636 W/ 250 OVERRIDE (IP): Performed by: STUDENT IN AN ORGANIZED HEALTH CARE EDUCATION/TRAINING PROGRAM

## 2019-04-21 PROCEDURE — 36415 COLL VENOUS BLD VENIPUNCTURE: CPT

## 2019-04-21 PROCEDURE — 80069 RENAL FUNCTION PANEL: CPT

## 2019-04-21 RX ORDER — MAGNESIUM SULFATE HEPTAHYDRATE 40 MG/ML
2 INJECTION, SOLUTION INTRAVENOUS ONCE
Status: COMPLETED | OUTPATIENT
Start: 2019-04-21 | End: 2019-04-21

## 2019-04-21 RX ORDER — CALCIUM CARBONATE 500 MG/1
500 TABLET, CHEWABLE ORAL 3 TIMES DAILY PRN
Status: DISCONTINUED | OUTPATIENT
Start: 2019-04-21 | End: 2019-04-21 | Stop reason: HOSPADM

## 2019-04-21 RX ORDER — LORAZEPAM 0.5 MG/1
0.5 TABLET ORAL EVERY 12 HOURS PRN
Status: DISCONTINUED | OUTPATIENT
Start: 2019-04-21 | End: 2019-04-21 | Stop reason: HOSPADM

## 2019-04-21 RX ORDER — MAGNESIUM SULFATE HEPTAHYDRATE 40 MG/ML
2 INJECTION, SOLUTION INTRAVENOUS ONCE
Status: DISCONTINUED | OUTPATIENT
Start: 2019-04-21 | End: 2019-04-21 | Stop reason: HOSPADM

## 2019-04-21 RX ADMIN — Medication 100 MG: at 06:03

## 2019-04-21 RX ADMIN — SPIRONOLACTONE 25 MG: 25 TABLET ORAL at 06:03

## 2019-04-21 RX ADMIN — THERA TABS 1 TABLET: TAB at 06:03

## 2019-04-21 RX ADMIN — MAGNESIUM SULFATE IN WATER 2 G: 40 INJECTION, SOLUTION INTRAVENOUS at 07:44

## 2019-04-21 RX ADMIN — LORAZEPAM 0.5 MG: 0.5 TABLET ORAL at 01:47

## 2019-04-21 RX ADMIN — FOLIC ACID 1 MG: 1 TABLET ORAL at 06:04

## 2019-04-21 RX ADMIN — LORAZEPAM 0.5 MG: 0.5 TABLET ORAL at 06:03

## 2019-04-21 RX ADMIN — LISINOPRIL 10 MG: 10 TABLET ORAL at 06:03

## 2019-04-21 RX ADMIN — ACETAMINOPHEN 650 MG: 325 TABLET, FILM COATED ORAL at 02:08

## 2019-04-21 RX ADMIN — OMEPRAZOLE 40 MG: 20 CAPSULE, DELAYED RELEASE ORAL at 06:03

## 2019-04-21 ASSESSMENT — LIFESTYLE VARIABLES
TOTAL SCORE: 5
AUDITORY DISTURBANCES: NOT PRESENT
PAROXYSMAL SWEATS: NO SWEAT VISIBLE
ANXIETY: *
AUDITORY DISTURBANCES: NOT PRESENT
ORIENTATION AND CLOUDING OF SENSORIUM: ORIENTED AND CAN DO SERIAL ADDITIONS
ORIENTATION AND CLOUDING OF SENSORIUM: ORIENTED AND CAN DO SERIAL ADDITIONS
TREMOR: TREMOR NOT VISIBLE BUT CAN BE FELT, FINGERTIP TO FINGERTIP
AGITATION: NORMAL ACTIVITY
TREMOR: NO TREMOR
HEADACHE, FULLNESS IN HEAD: MILD
PAROXYSMAL SWEATS: BARELY PERCEPTIBLE SWEATING. PALMS MOIST
TOTAL SCORE: 2
VISUAL DISTURBANCES: NOT PRESENT
NAUSEA AND VOMITING: NO NAUSEA AND NO VOMITING
NAUSEA AND VOMITING: NO NAUSEA AND NO VOMITING
PAROXYSMAL SWEATS: NO SWEAT VISIBLE
ANXIETY: MILDLY ANXIOUS
NAUSEA AND VOMITING: NO NAUSEA AND NO VOMITING
TREMOR: TREMOR NOT VISIBLE BUT CAN BE FELT, FINGERTIP TO FINGERTIP
ORIENTATION AND CLOUDING OF SENSORIUM: ORIENTED AND CAN DO SERIAL ADDITIONS
VISUAL DISTURBANCES: NOT PRESENT
AGITATION: SOMEWHAT MORE THAN NORMAL ACTIVITY
AGITATION: NORMAL ACTIVITY
ANXIETY: MILDLY ANXIOUS
TOTAL SCORE: 7
TREMOR: NO TREMOR
ANXIETY: NO ANXIETY (AT EASE)
HEADACHE, FULLNESS IN HEAD: MILD
HEADACHE, FULLNESS IN HEAD: VERY MILD
NAUSEA AND VOMITING: NO NAUSEA AND NO VOMITING
VISUAL DISTURBANCES: NOT PRESENT
ORIENTATION AND CLOUDING OF SENSORIUM: ORIENTED AND CAN DO SERIAL ADDITIONS
VISUAL DISTURBANCES: NOT PRESENT
AUDITORY DISTURBANCES: NOT PRESENT
AGITATION: NORMAL ACTIVITY
PAROXYSMAL SWEATS: BARELY PERCEPTIBLE SWEATING. PALMS MOIST
AUDITORY DISTURBANCES: NOT PRESENT
HEADACHE, FULLNESS IN HEAD: MODERATE
TOTAL SCORE: 3

## 2019-04-21 ASSESSMENT — ENCOUNTER SYMPTOMS
DIARRHEA: 0
HALLUCINATIONS: 0
VOMITING: 0
NAUSEA: 0
FEVER: 0
CHILLS: 0
FOCAL WEAKNESS: 0
HEADACHES: 1
SEIZURES: 0
TINGLING: 0
PALPITATIONS: 0
CONSTIPATION: 0
ABDOMINAL PAIN: 1
TREMORS: 1
SENSORY CHANGE: 0

## 2019-04-21 NOTE — PROGRESS NOTES
Report received from day shift RN, assumed care of pt. Pt A&O4, in no apparent distress, sleeping comfortably in bed, unlabored breathing. Plan of care discussed with pt, no questions or needs at this time. Tele box on, rhythm verified. Call light within reach, bed locked and in lowest position. All fall precautions and hourly rounding in place. Will continue to monitor.

## 2019-04-21 NOTE — PROGRESS NOTES
Pt is A&O x 4. Wants to leave AMA. AMA form signed. Pt didn't want to stay and discuss this with MD. PIV removed.

## 2019-04-21 NOTE — CARE PLAN
Problem: Safety  Goal: Will remain free from falls  Outcome: PROGRESSING AS EXPECTED  Pt fall risk assessment completed, pt a moderate fall risk. Pt educated on fall program rationale and verbalized understanding. Bed is locked and in lowest position, bed alarm on, non-skid socks in place. Pt calls appropriately when needing assistance. All fall precautions and hourly rounding in place.     Problem: Knowledge Deficit  Goal: Knowledge of the prescribed therapeutic regimen will improve  Outcome: PROGRESSING AS EXPECTED  Pt verbalizes understanding for prescribed regimen and participates in plan of care. Pt voices concerns as they arise and communicates needs with staff.

## 2019-04-21 NOTE — PROGRESS NOTES
"Patient seems anxious, upset that her Ativan orders are not available per request. She states she has nausea but refuses Tums, states \"it won't do anything! I am just sick!  Know my body, I am 58!\"   Pt requests Klonopin back, states it is her home med. Pt is yelling, states \"I will just discharge myself.\"   Paged [facility] for an update about her wish to be discharged today. Per [facility], they will talk to an attending in a bit.  Re-assured pt to this RN ability.   "

## 2019-04-21 NOTE — PROGRESS NOTES
Ashleigh Marquis patient has chosen to leave the hospital against medical advice. The attending physician has not discharged the patient. Patient is not a risk to himself or others. I have discussed with the patient the following:  Physician has not determined patient is ready for discharge, Risks and consequences of leaving the hospital too soon and Benefit of continued hospitalization.      Discharge against medical advice form has been Signed.      Attending physician has been notified.

## 2019-04-21 NOTE — PROGRESS NOTES
Received bedside report from PM nurse. Assumed patient care. Chart reviewed. Pt was resting in bed. A&O x 4. No concerns, complaints or distress. Patient states slight HA pain. POC updated with pt and on the patient communication board. Bed locked and in the lowest position. Call light within reach. Tele box on. Will continue to monitor.

## 2019-04-21 NOTE — PROGRESS NOTES
Internal Medicine Interval Note  Note Author: Vic Moore M.D.     Name Ashleigh Marquis     1960   Age/Sex 58 y.o. female   MRN [id number]   Code Status Full     After 5PM or if no immediate response to page, please call for cross-coverage  Attending/Team: Thomas/Edwin See Patient List for primary contact information  Call [phone] to page    1st Call - Day Intern (R1):   Teresa 2nd Call - Day Sr. Resident (R2/R3):   Jessica         Reason for interval visit  (Principal Problem)     Alcohol withdrawal    Interval Problem Daily Status Update  (24 hours, problem oriented, brief subjective history, new lab/imaging data pertinent to that problem)     - CIWA scores 5-9, Ativan 0.5 mg over 12 hours  - continues to have tremors and headache - says that she does not feel well and was incontinent of urine  - continues to be non-cooperative  - spoke with patient regarding her plan after discharge and how to decrease re-admission.  She is aware of  support groups and plans to attend.  She declined to tell me the name of her psychiatrist and said that it is personal information that she does not wish to share.    We will stop CIWA protocol and start ativan 0.5 mgQ12H PRN for agitation.    Review of Systems   Constitutional: Negative for chills and fever.   Cardiovascular: Negative for chest pain and palpitations.   Gastrointestinal: Positive for abdominal pain. Negative for constipation, diarrhea, nausea and vomiting.   Genitourinary: Positive for urgency.        Incontinence    Skin: Negative for itching and rash.   Neurological: Positive for tremors and headaches. Negative for tingling, sensory change, focal weakness and seizures.   Psychiatric/Behavioral: Negative for hallucinations.   All other systems reviewed and are negative.      Disposition/Barriers to discharge:   Continued alcohol withdrawal    Consultants/Specialty  Psychiatry     PCP: None      Quality Measures  Quality-Core  Measures   Reviewed items::  Labs reviewed and Medications reviewed  Velásquez catheter::  No Velásquez  DVT prophylaxis - mechanical:  SCDs      Physical Exam       Vitals:    04/20/19 2000 04/21/19 0000 04/21/19 0400 04/21/19 0800   BP: 138/93 140/73 132/79 137/98   Pulse: 83 83 79 67   Resp: 19 16 18 16   Temp: 36.3 °C (97.4 °F) 36.7 °C (98.1 °F) 36.1 °C (97 °F) 36.4 °C (97.5 °F)   TempSrc: Temporal Temporal Temporal Temporal   SpO2: 96% 98% 97% 91%   Weight: 82.1 kg (181 lb)      Height:         Body mass index is 33.1 kg/m². Weight: 82.1 kg (181 lb)  Oxygen Therapy:  Pulse Oximetry: 91 %, O2 (LPM): 0, O2 Delivery: None (Room Air)    Physical Exam  Constitutional:   Alert. Not cooperative.  HENT:   Head: Normocephalic and atraumatic.   Mouth/Throat: Oropharynx is clear and moist.   Eyes: Pupils are equal, round, and reactive to light. Conjunctivae and EOM are normal.  No scleral icterus.   Neck: Neck supple. No tracheal deviation present.   Cardiovascular: Normal rate, regular rhythm and normal heart sounds.  Exam reveals no gallop and no friction rub.    No murmur heard.  Pulmonary/Chest: Effort normal. No respiratory distress. She has no wheezes.   Abdominal: Soft. No rebound or guarding.  Musculoskeletal: She exhibits no edema.   Skin: Skin is warm and dry.   Psychiatric: No hallucinations.  Neuro:  A&O x 4. CN 1-12 grossly intact.  No focal neuro deficits.  Vitals reviewed.      Assessment/Plan     * Alcohol withdrawal syndrome without complication (HCC)- (present on admission)   Assessment & Plan    Continued alcohol withdrawal. Awake, protecting airway. No seizures. Question of psychiatric disorder(s) as causative factor in alcohol abuse.    - omeprazole 40 mg daily  - CIWA  - thiamine, folate, MV  - IVF  - seizure precautions, CTM  - Psychiatry consulted, unfortunately patient not cooperative during assessment  - cessation counseling, provide rehab resources      Hematemesis- (present on admission)   Assessment &  "Plan    Patient reported 2 episodes before admission, not witnessed. None during hospital course. History of duodenitis and previous upper and lower GI bleed. Hemoglobin stable.    - omeprazole 40 mg daily  - Monitor CBC     Heme positive stool- (present on admission)   Assessment & Plan    On exam by ED physician.    -Plan per \"hematemesis\" problem     Moderate major depression (HCC)- (present on admission)   Assessment & Plan    - Psychiatry consulted, unfortunately patient not cooperative during assessment     Duodenitis without bleeding- (present on admission)   Assessment & Plan    - continue omeprazole 40 mg daily     History of suicide attempt- (present on admission)   Assessment & Plan    Denies suicidal ideation.    - Psychiatry consulted, unfortunately patient not cooperative during assessment     Bipolar 2 disorder (HCC)- (present on admission)   Assessment & Plan    - Psychiatry consulted, unfortunately patient not cooperative during assessment     PTSD (post-traumatic stress disorder)- (present on admission)   Assessment & Plan    - Psychiatry consulted, unfortunately patient not cooperative during assessment     Hypomagnesemia- (present on admission)   Assessment & Plan    - monitor and replete as necessary, goal >2     Transaminitis- (present on admission)   Assessment & Plan    Likely due to EtOH abuse. Improved.    - Monitor chemistry  - Alcohol cessation counseling     Hypertension- (present on admission)   Assessment & Plan    - continue home lisinopril, Aldactone  - needs Primary Care follow up and recheck out of acute setting       "

## 2019-04-22 ENCOUNTER — APPOINTMENT (OUTPATIENT)
Dept: RADIOLOGY | Facility: MEDICAL CENTER | Age: 59
DRG: 917 | End: 2019-04-22
Attending: EMERGENCY MEDICINE
Payer: MEDICAID

## 2019-04-22 ENCOUNTER — APPOINTMENT (OUTPATIENT)
Dept: RADIOLOGY | Facility: MEDICAL CENTER | Age: 59
DRG: 917 | End: 2019-04-22
Attending: HOSPITALIST
Payer: MEDICAID

## 2019-04-22 ENCOUNTER — HOSPITAL ENCOUNTER (INPATIENT)
Facility: MEDICAL CENTER | Age: 59
LOS: 2 days | DRG: 917 | End: 2019-04-24
Attending: EMERGENCY MEDICINE | Admitting: HOSPITALIST
Payer: MEDICAID

## 2019-04-22 ENCOUNTER — APPOINTMENT (OUTPATIENT)
Dept: RADIOLOGY | Facility: MEDICAL CENTER | Age: 59
DRG: 917 | End: 2019-04-22
Attending: INTERNAL MEDICINE
Payer: MEDICAID

## 2019-04-22 DIAGNOSIS — Z91.51 HISTORY OF SUICIDE ATTEMPT: ICD-10-CM

## 2019-04-22 DIAGNOSIS — T50.902A INTENTIONAL DRUG OVERDOSE, INITIAL ENCOUNTER (HCC): ICD-10-CM

## 2019-04-22 DIAGNOSIS — R45.851 SUICIDAL IDEATION: ICD-10-CM

## 2019-04-22 DIAGNOSIS — F10.921 ALCOHOL INTOXICATION WITH DELIRIUM (HCC): ICD-10-CM

## 2019-04-22 DIAGNOSIS — F10.930 ALCOHOL WITHDRAWAL SYNDROME WITHOUT COMPLICATION (HCC): ICD-10-CM

## 2019-04-22 DIAGNOSIS — T42.4X4A BENZODIAZEPINE OVERDOSE OF UNDETERMINED INTENT, INITIAL ENCOUNTER: ICD-10-CM

## 2019-04-22 PROBLEM — G93.40 ACUTE ENCEPHALOPATHY: Status: ACTIVE | Noted: 2019-04-22

## 2019-04-22 PROBLEM — E66.3 OVERWEIGHT (BMI 25.0-29.9): Status: ACTIVE | Noted: 2019-04-22

## 2019-04-22 PROBLEM — F10.20 ALCOHOLISM (HCC): Status: ACTIVE | Noted: 2019-04-22

## 2019-04-22 PROBLEM — J96.00 ACUTE RESPIRATORY FAILURE (HCC): Status: ACTIVE | Noted: 2019-04-22

## 2019-04-22 LAB
ACTION RANGE TRIGGERED IACRT: YES
ALBUMIN SERPL BCP-MCNC: 4.4 G/DL (ref 3.2–4.9)
ALBUMIN/GLOB SERPL: 1.4 G/DL
ALP SERPL-CCNC: 124 U/L (ref 30–99)
ALT SERPL-CCNC: 46 U/L (ref 2–50)
AMMONIA PLAS-SCNC: 26 UMOL/L (ref 11–45)
ANION GAP SERPL CALC-SCNC: 13 MMOL/L (ref 0–11.9)
APAP SERPL-MCNC: <10 UG/ML (ref 10–30)
APPEARANCE UR: CLEAR
AST SERPL-CCNC: 48 U/L (ref 12–45)
BASE EXCESS BLDA CALC-SCNC: -8 MMOL/L (ref -4–3)
BASOPHILS # BLD AUTO: 1.1 % (ref 0–1.8)
BASOPHILS # BLD: 0.09 K/UL (ref 0–0.12)
BILIRUB SERPL-MCNC: 0.2 MG/DL (ref 0.1–1.5)
BILIRUB UR QL STRIP.AUTO: NEGATIVE
BODY TEMPERATURE: ABNORMAL DEGREES
BUN SERPL-MCNC: 13 MG/DL (ref 8–22)
CALCIUM SERPL-MCNC: 9.3 MG/DL (ref 8.5–10.5)
CHLORIDE SERPL-SCNC: 107 MMOL/L (ref 96–112)
CO2 BLDA-SCNC: 21 MMOL/L (ref 20–33)
CO2 SERPL-SCNC: 20 MMOL/L (ref 20–33)
COLOR UR: YELLOW
CREAT SERPL-MCNC: 0.89 MG/DL (ref 0.5–1.4)
EKG IMPRESSION: NORMAL
EOSINOPHIL # BLD AUTO: 0.12 K/UL (ref 0–0.51)
EOSINOPHIL NFR BLD: 1.4 % (ref 0–6.9)
ERYTHROCYTE [DISTWIDTH] IN BLOOD BY AUTOMATED COUNT: 56.3 FL (ref 35.9–50)
ETHANOL BLD-MCNC: 0.31 G/DL
GLOBULIN SER CALC-MCNC: 3.2 G/DL (ref 1.9–3.5)
GLUCOSE SERPL-MCNC: 104 MG/DL (ref 65–99)
GLUCOSE UR STRIP.AUTO-MCNC: NEGATIVE MG/DL
GRAM STN SPEC: NORMAL
HCG SERPL QL: NEGATIVE
HCO3 BLDA-SCNC: 19.2 MMOL/L (ref 17–25)
HCT VFR BLD AUTO: 40.3 % (ref 37–47)
HGB BLD-MCNC: 13.1 G/DL (ref 12–16)
HOROWITZ INDEX BLDA+IHG-RTO: 403 MM[HG]
IMM GRANULOCYTES # BLD AUTO: 0.04 K/UL (ref 0–0.11)
IMM GRANULOCYTES NFR BLD AUTO: 0.5 % (ref 0–0.9)
INST. QUALIFIED PATIENT IIQPT: YES
KETONES UR STRIP.AUTO-MCNC: NEGATIVE MG/DL
LEUKOCYTE ESTERASE UR QL STRIP.AUTO: NEGATIVE
LIPASE SERPL-CCNC: 81 U/L (ref 11–82)
LYMPHOCYTES # BLD AUTO: 2.89 K/UL (ref 1–4.8)
LYMPHOCYTES NFR BLD: 34.2 % (ref 22–41)
MAGNESIUM SERPL-MCNC: 2.4 MG/DL (ref 1.5–2.5)
MCH RBC QN AUTO: 31.9 PG (ref 27–33)
MCHC RBC AUTO-ENTMCNC: 32.5 G/DL (ref 33.6–35)
MCV RBC AUTO: 98.1 FL (ref 81.4–97.8)
MICRO URNS: NORMAL
MONOCYTES # BLD AUTO: 1.32 K/UL (ref 0–0.85)
MONOCYTES NFR BLD AUTO: 15.6 % (ref 0–13.4)
NEUTROPHILS # BLD AUTO: 3.99 K/UL (ref 2–7.15)
NEUTROPHILS NFR BLD: 47.2 % (ref 44–72)
NITRITE UR QL STRIP.AUTO: NEGATIVE
NRBC # BLD AUTO: 0 K/UL
NRBC BLD-RTO: 0 /100 WBC
O2/TOTAL GAS SETTING VFR VENT: 100 %
PCO2 BLDA: 46.6 MMHG (ref 26–37)
PCO2 TEMP ADJ BLDA: 44.4 MMHG (ref 26–37)
PH BLDA: 7.22 [PH] (ref 7.4–7.5)
PH TEMP ADJ BLDA: 7.24 [PH] (ref 7.4–7.5)
PH UR STRIP.AUTO: 5.5 [PH]
PHOSPHATE SERPL-MCNC: 4.4 MG/DL (ref 2.5–4.5)
PLATELET # BLD AUTO: 402 K/UL (ref 164–446)
PMV BLD AUTO: 10 FL (ref 9–12.9)
PO2 BLDA: 403 MMHG (ref 64–87)
PO2 TEMP ADJ BLDA: 397 MMHG (ref 64–87)
POTASSIUM SERPL-SCNC: 3.7 MMOL/L (ref 3.6–5.5)
PROT SERPL-MCNC: 7.6 G/DL (ref 6–8.2)
PROT UR QL STRIP: NEGATIVE MG/DL
RBC # BLD AUTO: 4.11 M/UL (ref 4.2–5.4)
RBC UR QL AUTO: NEGATIVE
RHODAMINE-AURAMINE STN SPEC: NORMAL
SALICYLATES SERPL-MCNC: 0 MG/DL (ref 15–25)
SAO2 % BLDA: 100 % (ref 93–99)
SIGNIFICANT IND 70042: NORMAL
SIGNIFICANT IND 70042: NORMAL
SITE SITE: NORMAL
SITE SITE: NORMAL
SODIUM SERPL-SCNC: 140 MMOL/L (ref 135–145)
SOURCE SOURCE: NORMAL
SOURCE SOURCE: NORMAL
SP GR UR STRIP.AUTO: 1.01
SPECIMEN DRAWN FROM PATIENT: ABNORMAL
TRIGL SERPL-MCNC: 394 MG/DL (ref 0–149)
UROBILINOGEN UR STRIP.AUTO-MCNC: 0.2 MG/DL
WBC # BLD AUTO: 8.5 K/UL (ref 4.8–10.8)

## 2019-04-22 PROCEDURE — 83690 ASSAY OF LIPASE: CPT

## 2019-04-22 PROCEDURE — 303105 HCHG CATHETER EXTRA

## 2019-04-22 PROCEDURE — 302214 INTUBATION BOX: Performed by: EMERGENCY MEDICINE

## 2019-04-22 PROCEDURE — 02HV33Z INSERTION OF INFUSION DEVICE INTO SUPERIOR VENA CAVA, PERCUTANEOUS APPROACH: ICD-10-PCS | Performed by: INTERNAL MEDICINE

## 2019-04-22 PROCEDURE — 99223 1ST HOSP IP/OBS HIGH 75: CPT | Performed by: HOSPITALIST

## 2019-04-22 PROCEDURE — 87181 SC STD AGAR DILUTION PER AGT: CPT

## 2019-04-22 PROCEDURE — C1751 CATH, INF, PER/CENT/MIDLINE: HCPCS

## 2019-04-22 PROCEDURE — 770022 HCHG ROOM/CARE - ICU (200)

## 2019-04-22 PROCEDURE — 94150 VITAL CAPACITY TEST: CPT

## 2019-04-22 PROCEDURE — 31645 BRNCHSC W/THER ASPIR 1ST: CPT | Performed by: INTERNAL MEDICINE

## 2019-04-22 PROCEDURE — 700111 HCHG RX REV CODE 636 W/ 250 OVERRIDE (IP): Performed by: HOSPITALIST

## 2019-04-22 PROCEDURE — 99292 CRITICAL CARE ADDL 30 MIN: CPT | Mod: 25 | Performed by: INTERNAL MEDICINE

## 2019-04-22 PROCEDURE — 87205 SMEAR GRAM STAIN: CPT

## 2019-04-22 PROCEDURE — 700111 HCHG RX REV CODE 636 W/ 250 OVERRIDE (IP)

## 2019-04-22 PROCEDURE — 36600 WITHDRAWAL OF ARTERIAL BLOOD: CPT

## 2019-04-22 PROCEDURE — 700111 HCHG RX REV CODE 636 W/ 250 OVERRIDE (IP): Performed by: EMERGENCY MEDICINE

## 2019-04-22 PROCEDURE — 85025 COMPLETE CBC W/AUTO DIFF WBC: CPT

## 2019-04-22 PROCEDURE — 87184 SC STD DISK METHOD PER PLATE: CPT

## 2019-04-22 PROCEDURE — 99291 CRITICAL CARE FIRST HOUR: CPT | Mod: 25 | Performed by: INTERNAL MEDICINE

## 2019-04-22 PROCEDURE — A9270 NON-COVERED ITEM OR SERVICE: HCPCS | Performed by: HOSPITALIST

## 2019-04-22 PROCEDURE — 87077 CULTURE AEROBIC IDENTIFY: CPT

## 2019-04-22 PROCEDURE — 700105 HCHG RX REV CODE 258: Performed by: EMERGENCY MEDICINE

## 2019-04-22 PROCEDURE — 96368 THER/DIAG CONCURRENT INF: CPT

## 2019-04-22 PROCEDURE — 82803 BLOOD GASES ANY COMBINATION: CPT

## 2019-04-22 PROCEDURE — 87070 CULTURE OTHR SPECIMN AEROBIC: CPT

## 2019-04-22 PROCEDURE — 81003 URINALYSIS AUTO W/O SCOPE: CPT

## 2019-04-22 PROCEDURE — 84100 ASSAY OF PHOSPHORUS: CPT

## 2019-04-22 PROCEDURE — 94002 VENT MGMT INPAT INIT DAY: CPT

## 2019-04-22 PROCEDURE — 80307 DRUG TEST PRSMV CHEM ANLYZR: CPT

## 2019-04-22 PROCEDURE — 87015 SPECIMEN INFECT AGNT CONCNTJ: CPT

## 2019-04-22 PROCEDURE — 3E033XZ INTRODUCTION OF VASOPRESSOR INTO PERIPHERAL VEIN, PERCUTANEOUS APPROACH: ICD-10-PCS | Performed by: INTERNAL MEDICINE

## 2019-04-22 PROCEDURE — 700102 HCHG RX REV CODE 250 W/ 637 OVERRIDE(OP): Performed by: HOSPITALIST

## 2019-04-22 PROCEDURE — 700105 HCHG RX REV CODE 258: Performed by: HOSPITALIST

## 2019-04-22 PROCEDURE — 87116 MYCOBACTERIA CULTURE: CPT

## 2019-04-22 PROCEDURE — 99292 CRITICAL CARE ADDL 30 MIN: CPT | Performed by: INTERNAL MEDICINE

## 2019-04-22 PROCEDURE — 700105 HCHG RX REV CODE 258: Performed by: INTERNAL MEDICINE

## 2019-04-22 PROCEDURE — 31624 DX BRONCHOSCOPE/LAVAGE: CPT | Mod: 51 | Performed by: INTERNAL MEDICINE

## 2019-04-22 PROCEDURE — 82140 ASSAY OF AMMONIA: CPT

## 2019-04-22 PROCEDURE — C1751 CATH, INF, PER/CENT/MIDLINE: HCPCS | Performed by: EMERGENCY MEDICINE

## 2019-04-22 PROCEDURE — 0B9D8ZX DRAINAGE OF RIGHT MIDDLE LUNG LOBE, VIA NATURAL OR ARTIFICIAL OPENING ENDOSCOPIC, DIAGNOSTIC: ICD-10-PCS | Performed by: INTERNAL MEDICINE

## 2019-04-22 PROCEDURE — 99291 CRITICAL CARE FIRST HOUR: CPT

## 2019-04-22 PROCEDURE — 96365 THER/PROPH/DIAG IV INF INIT: CPT

## 2019-04-22 PROCEDURE — 51702 INSERT TEMP BLADDER CATH: CPT

## 2019-04-22 PROCEDURE — 36556 INSERT NON-TUNNEL CV CATH: CPT

## 2019-04-22 PROCEDURE — 80053 COMPREHEN METABOLIC PANEL: CPT

## 2019-04-22 PROCEDURE — 96366 THER/PROPH/DIAG IV INF ADDON: CPT

## 2019-04-22 PROCEDURE — B548ZZA ULTRASONOGRAPHY OF SUPERIOR VENA CAVA, GUIDANCE: ICD-10-PCS | Performed by: INTERNAL MEDICINE

## 2019-04-22 PROCEDURE — 83735 ASSAY OF MAGNESIUM: CPT

## 2019-04-22 PROCEDURE — 0BH18EZ INSERTION OF ENDOTRACHEAL AIRWAY INTO TRACHEA, VIA NATURAL OR ARTIFICIAL OPENING ENDOSCOPIC: ICD-10-PCS | Performed by: EMERGENCY MEDICINE

## 2019-04-22 PROCEDURE — 31500 INSERT EMERGENCY AIRWAY: CPT

## 2019-04-22 PROCEDURE — 700111 HCHG RX REV CODE 636 W/ 250 OVERRIDE (IP): Performed by: INTERNAL MEDICINE

## 2019-04-22 PROCEDURE — 71045 X-RAY EXAM CHEST 1 VIEW: CPT

## 2019-04-22 PROCEDURE — 87102 FUNGUS ISOLATION CULTURE: CPT

## 2019-04-22 PROCEDURE — 93005 ELECTROCARDIOGRAM TRACING: CPT | Performed by: EMERGENCY MEDICINE

## 2019-04-22 PROCEDURE — 700101 HCHG RX REV CODE 250: Performed by: INTERNAL MEDICINE

## 2019-04-22 PROCEDURE — 87206 SMEAR FLUORESCENT/ACID STAI: CPT

## 2019-04-22 PROCEDURE — 302978 HCHG BRONCHOSCOPY-DIAGNOSTIC

## 2019-04-22 PROCEDURE — 5A1935Z RESPIRATORY VENTILATION, LESS THAN 24 CONSECUTIVE HOURS: ICD-10-PCS | Performed by: INTERNAL MEDICINE

## 2019-04-22 PROCEDURE — 84478 ASSAY OF TRIGLYCERIDES: CPT

## 2019-04-22 PROCEDURE — 70450 CT HEAD/BRAIN W/O DYE: CPT

## 2019-04-22 PROCEDURE — 84703 CHORIONIC GONADOTROPIN ASSAY: CPT

## 2019-04-22 PROCEDURE — 96367 TX/PROPH/DG ADDL SEQ IV INF: CPT

## 2019-04-22 RX ORDER — CLONIDINE HYDROCHLORIDE 0.1 MG/1
0.1 TABLET ORAL EVERY 6 HOURS PRN
Status: DISCONTINUED | OUTPATIENT
Start: 2019-04-22 | End: 2019-04-22

## 2019-04-22 RX ORDER — PROMETHAZINE HYDROCHLORIDE 25 MG/1
12.5-25 TABLET ORAL EVERY 4 HOURS PRN
Status: DISCONTINUED | OUTPATIENT
Start: 2019-04-22 | End: 2019-04-24 | Stop reason: HOSPADM

## 2019-04-22 RX ORDER — PROMETHAZINE HYDROCHLORIDE 25 MG/1
12.5-25 SUPPOSITORY RECTAL EVERY 4 HOURS PRN
Status: DISCONTINUED | OUTPATIENT
Start: 2019-04-22 | End: 2019-04-24 | Stop reason: HOSPADM

## 2019-04-22 RX ORDER — FLUOXETINE HYDROCHLORIDE 40 MG/1
40 CAPSULE ORAL DAILY
COMMUNITY
End: 2019-08-05

## 2019-04-22 RX ORDER — FAMOTIDINE 20 MG/1
20 TABLET, FILM COATED ORAL EVERY 12 HOURS
Status: DISCONTINUED | OUTPATIENT
Start: 2019-04-22 | End: 2019-04-22

## 2019-04-22 RX ORDER — BISACODYL 10 MG
10 SUPPOSITORY, RECTAL RECTAL
Status: DISCONTINUED | OUTPATIENT
Start: 2019-04-22 | End: 2019-04-24 | Stop reason: HOSPADM

## 2019-04-22 RX ORDER — PHENOBARBITAL SODIUM 130 MG/ML
130 INJECTION INTRAMUSCULAR; INTRAVENOUS
Status: DISCONTINUED | OUTPATIENT
Start: 2019-04-22 | End: 2019-04-22

## 2019-04-22 RX ORDER — AMOXICILLIN 250 MG
2 CAPSULE ORAL 2 TIMES DAILY
Status: DISCONTINUED | OUTPATIENT
Start: 2019-04-22 | End: 2019-04-22

## 2019-04-22 RX ORDER — SODIUM CHLORIDE 9 MG/ML
1000 INJECTION, SOLUTION INTRAVENOUS ONCE
Status: COMPLETED | OUTPATIENT
Start: 2019-04-22 | End: 2019-04-22

## 2019-04-22 RX ORDER — ETOMIDATE 2 MG/ML
10 INJECTION INTRAVENOUS ONCE
Status: COMPLETED | OUTPATIENT
Start: 2019-04-22 | End: 2019-04-22

## 2019-04-22 RX ORDER — SUCCINYLCHOLINE CHLORIDE 20 MG/ML
100 INJECTION INTRAMUSCULAR; INTRAVENOUS ONCE
Status: COMPLETED | OUTPATIENT
Start: 2019-04-22 | End: 2019-04-22

## 2019-04-22 RX ORDER — POLYETHYLENE GLYCOL 3350 17 G/17G
1 POWDER, FOR SOLUTION ORAL
Status: DISCONTINUED | OUTPATIENT
Start: 2019-04-22 | End: 2019-04-22

## 2019-04-22 RX ORDER — NOREPINEPHRINE BITARTRATE 1 MG/ML
INJECTION, SOLUTION INTRAVENOUS
Status: DISPENSED
Start: 2019-04-22 | End: 2019-04-22

## 2019-04-22 RX ORDER — ETOMIDATE 2 MG/ML
20 INJECTION INTRAVENOUS ONCE
Status: COMPLETED | OUTPATIENT
Start: 2019-04-22 | End: 2019-04-22

## 2019-04-22 RX ORDER — BISACODYL 10 MG
10 SUPPOSITORY, RECTAL RECTAL
Status: DISCONTINUED | OUTPATIENT
Start: 2019-04-22 | End: 2019-04-22

## 2019-04-22 RX ORDER — POTASSIUM CHLORIDE 20 MEQ/1
40 TABLET, EXTENDED RELEASE ORAL ONCE
Status: ACTIVE | OUTPATIENT
Start: 2019-04-22 | End: 2019-04-23

## 2019-04-22 RX ORDER — POLYETHYLENE GLYCOL 3350 17 G/17G
1 POWDER, FOR SOLUTION ORAL
Status: DISCONTINUED | OUTPATIENT
Start: 2019-04-22 | End: 2019-04-24 | Stop reason: HOSPADM

## 2019-04-22 RX ORDER — FAMOTIDINE 20 MG/1
20 TABLET, FILM COATED ORAL EVERY 12 HOURS
Status: DISCONTINUED | OUTPATIENT
Start: 2019-04-22 | End: 2019-04-23

## 2019-04-22 RX ORDER — AMOXICILLIN 250 MG
2 CAPSULE ORAL 2 TIMES DAILY
Status: DISCONTINUED | OUTPATIENT
Start: 2019-04-23 | End: 2019-04-24 | Stop reason: HOSPADM

## 2019-04-22 RX ORDER — CLONIDINE HYDROCHLORIDE 0.1 MG/1
0.1 TABLET ORAL
Status: DISCONTINUED | OUTPATIENT
Start: 2019-04-22 | End: 2019-04-24 | Stop reason: HOSPADM

## 2019-04-22 RX ORDER — ONDANSETRON 2 MG/ML
4 INJECTION INTRAMUSCULAR; INTRAVENOUS EVERY 4 HOURS PRN
Status: DISCONTINUED | OUTPATIENT
Start: 2019-04-22 | End: 2019-04-24 | Stop reason: HOSPADM

## 2019-04-22 RX ORDER — ACETAMINOPHEN 325 MG/1
650 TABLET ORAL EVERY 6 HOURS PRN
Status: DISCONTINUED | OUTPATIENT
Start: 2019-04-22 | End: 2019-04-24 | Stop reason: HOSPADM

## 2019-04-22 RX ORDER — DEXMEDETOMIDINE HYDROCHLORIDE 4 UG/ML
.1-1.5 INJECTION, SOLUTION INTRAVENOUS CONTINUOUS
Status: DISCONTINUED | OUTPATIENT
Start: 2019-04-22 | End: 2019-04-23

## 2019-04-22 RX ORDER — ONDANSETRON 4 MG/1
4 TABLET, ORALLY DISINTEGRATING ORAL EVERY 4 HOURS PRN
Status: DISCONTINUED | OUTPATIENT
Start: 2019-04-22 | End: 2019-04-24 | Stop reason: HOSPADM

## 2019-04-22 RX ORDER — MAGNESIUM SULFATE HEPTAHYDRATE 40 MG/ML
2 INJECTION, SOLUTION INTRAVENOUS ONCE
Status: COMPLETED | OUTPATIENT
Start: 2019-04-22 | End: 2019-04-22

## 2019-04-22 RX ORDER — SODIUM CHLORIDE 9 MG/ML
INJECTION, SOLUTION INTRAVENOUS CONTINUOUS
Status: DISCONTINUED | OUTPATIENT
Start: 2019-04-22 | End: 2019-04-23

## 2019-04-22 RX ORDER — PHENOBARBITAL SODIUM 130 MG/ML
260 INJECTION INTRAMUSCULAR; INTRAVENOUS
Status: DISCONTINUED | OUTPATIENT
Start: 2019-04-22 | End: 2019-04-22

## 2019-04-22 RX ADMIN — ENOXAPARIN SODIUM 40 MG: 100 INJECTION SUBCUTANEOUS at 06:04

## 2019-04-22 RX ADMIN — MAGNESIUM SULFATE IN WATER 2 G: 40 INJECTION, SOLUTION INTRAVENOUS at 06:03

## 2019-04-22 RX ADMIN — SODIUM CHLORIDE: 9 INJECTION, SOLUTION INTRAVENOUS at 19:27

## 2019-04-22 RX ADMIN — SUCCINYLCHOLINE CHLORIDE 100 MG: 20 INJECTION, SOLUTION INTRAMUSCULAR; INTRAVENOUS at 02:08

## 2019-04-22 RX ADMIN — ONDANSETRON 4 MG: 2 INJECTION INTRAMUSCULAR; INTRAVENOUS at 21:21

## 2019-04-22 RX ADMIN — SODIUM CHLORIDE 1000 ML: 9 INJECTION, SOLUTION INTRAVENOUS at 02:50

## 2019-04-22 RX ADMIN — FAMOTIDINE 20 MG: 10 INJECTION INTRAVENOUS at 17:38

## 2019-04-22 RX ADMIN — DEXMEDETOMIDINE HYDROCHLORIDE 1.5 MCG/KG/HR: 100 INJECTION, SOLUTION INTRAVENOUS at 11:31

## 2019-04-22 RX ADMIN — ETOMIDATE 20 MG: 2 INJECTION INTRAVENOUS at 02:07

## 2019-04-22 RX ADMIN — ETOMIDATE 20 MG: 2 INJECTION INTRAVENOUS at 03:52

## 2019-04-22 RX ADMIN — NOREPINEPHRINE BITARTRATE 15 MCG/MIN: 1 INJECTION INTRAVENOUS at 06:00

## 2019-04-22 RX ADMIN — NOREPINEPHRINE BITARTRATE 10 MCG/MIN: 1 INJECTION INTRAVENOUS at 04:10

## 2019-04-22 RX ADMIN — PROPOFOL 10 MCG/KG/MIN: 10 INJECTION, EMULSION INTRAVENOUS at 02:35

## 2019-04-22 RX ADMIN — FENTANYL CITRATE 100 MCG: 50 INJECTION, SOLUTION INTRAMUSCULAR; INTRAVENOUS at 09:02

## 2019-04-22 RX ADMIN — THIAMINE HYDROCHLORIDE 250 MG: 100 INJECTION, SOLUTION INTRAMUSCULAR; INTRAVENOUS at 06:52

## 2019-04-22 RX ADMIN — SODIUM CHLORIDE: 9 INJECTION, SOLUTION INTRAVENOUS at 06:22

## 2019-04-22 RX ADMIN — PROPOFOL 50 MCG/KG/MIN: 10 INJECTION, EMULSION INTRAVENOUS at 06:55

## 2019-04-22 RX ADMIN — ETOMIDATE 10 MG: 2 INJECTION INTRAVENOUS at 04:15

## 2019-04-22 RX ADMIN — ACETAMINOPHEN 650 MG: 325 TABLET, FILM COATED ORAL at 23:13

## 2019-04-22 RX ADMIN — THIAMINE HYDROCHLORIDE 250 MG: 100 INJECTION, SOLUTION INTRAMUSCULAR; INTRAVENOUS at 14:30

## 2019-04-22 RX ADMIN — FAMOTIDINE 20 MG: 10 INJECTION INTRAVENOUS at 06:04

## 2019-04-22 RX ADMIN — FENTANYL CITRATE 100 MCG/HR: 50 INJECTION, SOLUTION INTRAMUSCULAR; INTRAVENOUS at 02:30

## 2019-04-22 RX ADMIN — THIAMINE HYDROCHLORIDE 250 MG: 100 INJECTION, SOLUTION INTRAMUSCULAR; INTRAVENOUS at 21:15

## 2019-04-22 RX ADMIN — FENTANYL CITRATE 100 MCG: 50 INJECTION, SOLUTION INTRAMUSCULAR; INTRAVENOUS at 11:47

## 2019-04-22 RX ADMIN — PHENOBARBITAL SODIUM 616 MG: 130 INJECTION INTRAMUSCULAR; INTRAVENOUS at 05:18

## 2019-04-22 ASSESSMENT — PULMONARY FUNCTION TESTS: FVC: 1.9

## 2019-04-22 NOTE — ASSESSMENT & PLAN NOTE
Initial hypoxia and hypercarbia due to etoh/drug intoxication  Extubated  Continue RT protocol, encourage deep inspiratory efforts  Supplemental oxygen  Smoking cessation encouraged 4/23  Pulmonary consulting.

## 2019-04-22 NOTE — ASSESSMENT & PLAN NOTE
Continue MVI/folate/thiamine  Encourage cessation clinically appropriate  Continue telemetry monitoring  Aspiration and seizure precautions  Aggressive electrolyte replacement  Continue Precedex with active titration

## 2019-04-22 NOTE — PROGRESS NOTES
Dr. Kay is aware that patient is on a legal hold.     Charge Nurse, Jose Alfredo, is aware that patient is on a legal hold. He is arranging a sitter. This RN is at bedside until sitter can be arranged.

## 2019-04-22 NOTE — PROCEDURES
Central Line Insertion  Date/Time: 4/22/2019 4:28 AM  Performed by: LIU PEREZ  Authorized by: LIU PEREZ     Consent:     Consent obtained:  Emergent situation    Risks discussed:  Incorrect placement, pneumothorax, infection and bleeding    Alternatives discussed:  No treatment  Pre-procedure details:     Hand hygiene: Hand hygiene performed prior to insertion      Sterile barrier technique: All elements of maximal sterile technique followed      Skin preparation:  ChloraPrep  Sedation:     Sedation type:  Deep (etomidate)  Anesthesia:     Anesthesia method:  Local infiltration    Local anesthetic:  Lidocaine 1% w/o epi  Procedure details:     Location:  R internal jugular    Procedural supplies:  Triple lumen    Catheter size:  8.5 Fr    Landmarks identified: yes      Ultrasound guidance: yes      Number of attempts:  2    Successful placement: yes    Post-procedure details:     Post-procedure:  Dressing applied and line sutured    Assessment:  Blood return through all ports and free fluid flow    Patient tolerance of procedure:  Tolerated well, no immediate complications

## 2019-04-22 NOTE — PROGRESS NOTES
· 2 RN skin check complete with TETO Espinoza.  · Devices in place EKG/BP/SpO2/ET tube, Velásquez, SCDs, OG Central line.  · Skin assessed under devices Completed.  · Confirmed pressure ulcers found on NONE  · The following interventions in place Q2 turns, Mepilex, elbows/heels floated.

## 2019-04-22 NOTE — ED PROVIDER NOTES
ED Provider Note    Scribed for Irena Flores M.D. by Lisa Silvestre. 4/22/2019, 2:03 AM.    Primary care provider: Pcp Pt States None  Means of arrival: ambulance  History obtained from: EMS  History limited by: none    CHIEF COMPLAINT  Chief Complaint   Patient presents with   • Alcohol Intoxication   • Suicide Attempt       HPI  Ashleigh Marquis is a 58 y.o. female who presents to the Emergency Department via ambulance due to altered level of consciousness secondary to alcohol intoxication. She was found down in the middle of the street.  Per EMS patient told him that she had been drinking alcohol and ingested clonazepam.  She reportedly told EMS that this was with intent to harm herself.  Initially she was fairly awake but appeared intoxicated for EMS.  However on arrival patient decompensated and became more altered and therefore further history could not be obtained     Per chart review patient is well-known to this emergency department.  She was just recently admitted and discharged for alcohol withdrawal.  She was discharged 2 days ago.     REVIEW OF SYSTEMS  Review of Systems   Constitutional:        Alcohol intoxication    Neurological:        Altered level of consciousness      Review of systems is limited by altered mental status    Below history obtained from chart review    PAST MEDICAL HISTORY   has a past medical history of Alcoholism (HCC); Anxiety; Arthritis; ASTHMA; Cancer (HCC) (1981); Chronic low back pain; Congestive heart failure (HCC); Depression; EtOH dependence (HCC); Fall; GERD (gastroesophageal reflux disease); HTN; Hypertension; Indigestion; Muscle disorder; OSTEOPOROSIS; Other specified symptom associated with female genital organs; Psychiatric disorder; PTSD (post-traumatic stress disorder); Renal disorder; Seizure (HCC); Ulcer; and Vitamin D deficiency.    SURGICAL HISTORY   has a past surgical history that includes hernia repair (1977); cysto stent placemnt pre surg  "(10/7/2010); cystoscopy stent placement (11/9/2010); ureteroscopy (11/9/2010); lasertripsy (11/9/2010); stent removal (11/9/2010); and gastroscopy-endo (N/A, 10/3/2018).    SOCIAL HISTORY  Social History   Substance Use Topics   • Smoking status: Current Every Day Smoker     Packs/day: 0.50     Years: 20.00     Types: Cigarettes   • Smokeless tobacco: Never Used      Comment: 1/2 pack per day   • Alcohol use Yes      Comment: vodka, heavy use      History   Drug Use No       FAMILY HISTORY  Family History   Problem Relation Age of Onset   • Heart Disease Father    • Hypertension Father    • Diabetes Father    • Cancer Paternal Grandmother    • Depression Other    • Lung Disease Neg Hx    • Stroke Neg Hx        CURRENT MEDICATIONS  Home Medications    **Home medications have not yet been reviewed for this encounter**         ALLERGIES  Allergies   Allergen Reactions   • Sulfa Drugs Vomiting   • Toradol Vomiting   • Gabapentin      Bowel incontinence     • Amoxicillin Rash     Reported by NAIDA on arrival to ED    Pt states she takes PCN 10/3       PHYSICAL EXAM  VITAL SIGNS: Pulse (!) 59   Temp (!) 35.2 °C (95.4 °F)   Resp (!) 22   Ht 1.702 m (5' 7\")   Wt 86.1 kg (189 lb 13.1 oz)   LMP 11/02/2015   SpO2 100%   BMI 29.73 kg/m²   Vitals reviewed by myself.  Physical Exam   Nursing note and vitals reviewed.  Constitutional: Patient is obtunded, difficult to arouse  HENT: Head is normocephalic and atraumatic.  Eyes: extra-ocular movements intact  Cardiovascular: regular rate and  regular rhythm. No murmur heard.  Pulmonary/Chest: Breath sounds normal. No wheezes or rales.   Abdominal: Soft and non-tender. No distention.    Musculoskeletal:  Patient has spontaneous movement of all extremities  Neurological:  Patient is obtunded and difficult to arouse even with painful talus, no focal neurologic deficits noted  Skin: Skin is warm and dry. No rash.        DIAGNOSTIC STUDIES /  LABS  Labs Reviewed   CBC WITH " DIFFERENTIAL - Abnormal; Notable for the following:        Result Value    RBC 4.11 (*)     MCV 98.1 (*)     MCHC 32.5 (*)     RDW 56.3 (*)     Monocytes 15.60 (*)     Monos (Absolute) 1.32 (*)     All other components within normal limits    Narrative:     While on propofol   COMP METABOLIC PANEL - Abnormal; Notable for the following:     Anion Gap 13.0 (*)     Glucose 104 (*)     AST(SGOT) 48 (*)     Alkaline Phosphatase 124 (*)     All other components within normal limits    Narrative:     While on propofol   SALICYLATE - Abnormal; Notable for the following:     Salicylates, Quant. 0 (*)     All other components within normal limits   TRIGLYCERIDE - Abnormal; Notable for the following:     Triglycerides 394 (*)     All other components within normal limits    Narrative:     While on propofol   DIAGNOSTIC ALCOHOL - Abnormal; Notable for the following:     Diagnostic Alcohol 0.31 (*)     All other components within normal limits   ISTAT ARTERIAL BLOOD GAS - Abnormal; Notable for the following:     Ph 7.223 (*)     Pco2 46.6 (*)     Po2 403 (*)     S02 100 (*)     BE -8 (*)     Ph Temp Whit 7.238 (*)     Pco2 Temp Co 44.4 (*)     Po2 Temp Cor 397 (*)     All other components within normal limits   LIPASE    Narrative:     While on propofol   ACETAMINOPHEN    Narrative:     While on propofol   HCG QUAL SERUM    Narrative:     While on propofol   URINALYSIS,CULTURE IF INDICATED   ESTIMATED GFR    Narrative:     While on propofol   CULTURE RESPIRATORY W/ GRM STN   ARTERIAL BLOOD GAS   PROTHROMBIN TIME   APTT   URINALYSIS   AMMONIA   MAGNESIUM   PHOSPHORUS   TRIGLYCERIDE   AFB CULTURE   CYTOLOGY   FUNGAL CULTURE       All labs reviewed by me.    EKG Interpretation:  Interpreted by myself    12 Lead EKG interpreted by me to show:  EKG at 0251: Normal sinus rhythm, heart rate 68, normal axis, normal intervals, , QRS 96, QTc 451, no acute ST-T segment changes, no evidence of acute arrhythmia or ischemia  My  impression of this EKG: Does not indicate ischemia or arrhythmia at this time.    RADIOLOGY  CT-HEAD W/O   Final Result      1. Cerebral atrophy.   2. White matter lucencies most consistent with small vessel ischemic change versus demyelination or gliosis.   3. Otherwise, Head CT without contrast with no acute findings. No evidence of acute cerebral infarction, hemorrhage or mass lesion.      DX-CHEST-PORTABLE (1 VIEW)   Final Result      1.  No visible pneumothorax following RIGHT neck catheter placement   2.  Bibasilar underinflation atelectasis which could obscure an additional process. This has increased.      DX-CHEST-PORTABLE (1 VIEW)   Final Result      Line and tube position as described above        The radiologist's interpretation of all radiological studies have been reviewed by me.      PROCEDURES  Intubation Procedure Note    Indication: hypoxia, altered mental status    Consent: Unable to be obtained due to patient's condition.    Medications Used: etomidate intravenously and succinycholine intravenously    Procedure: The patient was placed in the appropriate position.  Cricoid pressure was not required.  Intubation was performed with direct laryngoscopy by medics student under my supervision using a glidescope a 7.5 cuffed endotracheal tube.  The cuff was then inflated and the tube was secured appropriately at a distance of 23 cm to the dental ridge.  Initial confirmation of placement included bilateral breath sounds, an end tidal CO2 detector, adequate chest rise and adequate pulse oximetry reading.  A chest x-ray to verify correct placement of the tube showed appropriate tube position.    The patient tolerated the procedure well.     Complications: None      CRITICAL CARE  The very real possibilty of a deterioration of this patient's condition required the highest level of my preparedness for sudden, emergent intervention.  I provided critical care services, which included medication orders,  frequent reevaluations of the patient's condition and response to treatment, ordering and reviewing test results, and discussing the case with various consultants.  The critical care time associated with the care of the patient was 35 minutes. Review chart for interventions. This time is exclusive of any other billable procedures.       REASSESSMENT  2:03 AM - I evaluated patient at bedside.  I informed them of my plan of care today given their presentation. The patient has given consent for these procedures and understands the plan of care.     2:29 AM - Chest X-ray shows successful intubation and proper placement of ET tube.     2:37 AM - The patient was agitated, so I started her on a propofol drip along with the fentanyl drip.     3:15 AM - Patient was noted to be hypotensive at this time. Held all sedation and started fluids.     3:45 AM - Patient still hypotensive, intensivist Dr. Martin at bedside. Patient started on peripheral Levophed and central line will be placed by Dr. Martin. Still awaiting head CT. Will take patient after she is stabilized.     4:10 AM I discussed the patient's case and the above findings with Dr. Mattson (hospitalist) who will accept the patient for admission to the ICU.       COURSE & MEDICAL DECISION MAKING  Nursing notes, VS, PMSFHx reviewed in chart.    Patient is a 58-year-old female who comes in for altered mental status.  Per EMS patient was alert enough to give them a history that she took benzodiazepines and drink a lot of alcohol tonight with intent to harm herself.  On arrival patient is obtunded and difficult to arouse.  Her oxygen saturation on nonrebreather is in the mid 80s.  Given her mental status I do not believe patient can adequately protect her airway and therefore elect to intubate her.  Please see procedure note above for details of intubation.  After procedure patient was oxygenating well and had good breath sounds bilaterally.  Chest x-ray confirmed adequate  position of endotracheal tube.  Patient was sedated with fentanyl and propofol.    I believe altered mental status is likely related to alcohol and benzodiazepine ingestion, however as patient had expressed suicidality to EMS I will assess for possible coingestions and obtain a CT of her head to ensure there is no acute intracranial process as the cause of her altered mental status.  She has no obvious signs of head trauma.  Of note after patient was sedated she did become hypotensive, sedation was held and fluids were initiated but patient remained hypotensive.  Intensivist Dr. Martin was at patient's bedside and patient was started on peripheral levophed, Dr. Martin will place central line.  Labs returned and are notable for an ethanol level of 0.31.  Labs are otherwise unremarkable.  CT of the head demonstrates no acute intracranial processes.  Patient will be admitted for further management of her altered mental status and respiratory failure.  I discussed the case with Dr. Mattson who has accepted the admission.  Dr. Martin is consulting.  At time of admission patient is in critical condition.      FINAL IMPRESSION  1. History of suicide attempt    2. Alcohol withdrawal syndrome without complication (HCC)    3. Intentional drug overdose, initial encounter (HCC)    4. Alcohol intoxication with delirium (HCC)    5. Suicidal ideation    6. Benzodiazepine overdose of undetermined intent, initial encounter     35 minutes of critical care time performed     ILisa (Scribe), am scribing for, and in the presence of, Irena Flores M.D..    Electronically signed by: Lisa Silvestre (Scribe), 4/22/2019    IIrena M.D. personally performed the services described in this documentation, as scribed by Lisa Silvestre in my presence, and it is both accurate and complete.    C    The note accurately reflects work and decisions made by me.  Irena Flores  4/22/2019  6:34 AM

## 2019-04-22 NOTE — RESPIRATORY CARE
Intubation Assist    Intubation assist performed yes  Positive Color Change on EZCap? yes  Difficult Intubation/Number of attempts no/1  Evidence of aspiration none    Airway ETT Oral 7.5-Secured At  (cm): 23 (04/22/19 0214)  Nava Vent Mode: APVCMV (04/22/19 0210)     Rate (breaths/min): 18 (04/22/19 0210)  Vt Target (mL): 450 (04/22/19 0210)  FiO2: 100 (04/22/19 0210)  PEEP/CPAP: 8 (04/22/19 0210)       Events/Summary/Plan: Pt intubated in ER (04/22/19 0210)

## 2019-04-22 NOTE — PROGRESS NOTES
Charge Nurse, Jose Alfredo, sat at bedside with patient from 1330 until 1400.     Carol Mathew is now sitting at the bedside.

## 2019-04-22 NOTE — CONSULTS
Critical Care Consultation    Date of consult: 4/22/2019    Referring Physician  Irena Flores M.D.    Reason for Consultation  Benzo overdose with hypoxic respiratory failure.     History of Presenting Illness  58 y.o. female who presented 4/22/2019 with alcohol abuse, chronic pain, depression, PTSD, gerd, CHF. That was found in the middle of road by EMS and brought to [facility]. She expressed she took 10 benzo's tablets and suicidal ideation. She was found with saturation of 77% in ER was placed on NRB but then became more somnolent so was intubated for airway protection. When I meet patient she is restless on high dose on propofol and fentanyl gtts. She has no signs of trauma. Her vitals or stable when backing down on propofol. Labs were unremarkable, other then CO2 22, AG 13, mg 1.6, EtOH 0.31 tylenol and aspirin negative. Ct head was unremarkable and as well was CXR. Ekg was SNR at 68 with no acute findings. Bedside Echo with good BiV function.     Code Status  Full Code    Review of Systems  Review of Systems   Unable to perform ROS: Intubated       Past Medical History   has a past medical history of Alcoholism (Formerly [facility]); Anxiety; Arthritis; ASTHMA; Cancer (HCC) (1981); Chronic low back pain; Congestive heart failure (HCC); Depression; EtOH dependence (Formerly [facility]); Fall; GERD (gastroesophageal reflux disease); HTN; Hypertension; Indigestion; Muscle disorder; OSTEOPOROSIS; Other specified symptom associated with female genital organs; Psychiatric disorder; PTSD (post-traumatic stress disorder); Renal disorder; Seizure (Formerly [facility]); Ulcer; and Vitamin D deficiency. She also has no past medical history of Heart murmur.    Surgical History   has a past surgical history that includes hernia repair (1977); cysto stent placemnt pre surg (10/7/2010); cystoscopy stent placement (11/9/2010); ureteroscopy (11/9/2010); lasertripsy (11/9/2010); stent removal (11/9/2010); and gastroscopy-endo (N/A, 10/3/2018).    Family History  family  history includes Cancer in her paternal grandmother; Depression in her other; Diabetes in her father; Heart Disease in her father; Hypertension in her father.    Social History   reports that she has been smoking Cigarettes.  She has a 10.00 pack-year smoking history. She has never used smokeless tobacco. She reports that she drinks alcohol. She reports that she does not use drugs.    Medications  Home Medications    **Home medications have not yet been reviewed for this encounter**       Current Facility-Administered Medications   Medication Dose Route Frequency Provider Last Rate Last Dose   • NOREPINEPHRINE BITARTRATE 1 MG/ML IV SOLN            • Respiratory Care per Protocol   Nebulization Continuous RT Fili Martin M.D.       • famotidine (PEPCID) tablet 20 mg  20 mg Enteral Tube Q12HRS Fili Martin M.D.        Or   • famotidine (PEPCID) injection 20 mg  20 mg Intravenous Q12HRS Fili Martin M.D.   20 mg at 04/22/19 0604   • senna-docusate (PERICOLACE or SENOKOT S) 8.6-50 MG per tablet 2 Tab  2 Tab Enteral Tube BID Fili Martin M.D.   Stopped at 04/22/19 0600    And   • polyethylene glycol/lytes (MIRALAX) PACKET 1 Packet  1 Packet Enteral Tube QDAY PRN Fili Martin M.D.        And   • magnesium hydroxide (MILK OF MAGNESIA) suspension 30 mL  30 mL Enteral Tube QDAY PRN Fili Martin M.D.        And   • bisacodyl (DULCOLAX) suppository 10 mg  10 mg Rectal QDAY PRN Fili Martin M.D.       • MD Alert...ICU Electrolyte Replacement per Pharmacy   Other PHARMACY TO DOSE Fili Martin M.D.       • Pharmacy Consult: Enteral tube insertion - review meds/change route/product selection   Other PHARMACY TO DOSE Fili Martin M.D.       • enoxaparin (LOVENOX) inj 40 mg  40 mg Subcutaneous DAILY Fili Martin M.D.   40 mg at 04/22/19 0604   • thiamine (B-1) 250 mg in D5W 100 mL IVPB  250 mg Intravenous Q8HRS Fili Martin M.D. 200 mL/hr at 04/22/19 0652 250 mg at 04/22/19 0652    • Pharmacy Consult Request - Benzodiazepine review   Other PHARMACY TO DOSE Fili Martin M.D.       • PHENObarbital injection 130 mg  130 mg Intravenous Q30 MIN PRN Fili Martin M.D.        And   • PHENObarbital injection 260 mg  260 mg Intravenous Q30 MIN PRN Fili Martin M.D.       • magnesium sulfate IVPB premix 2 g  2 g Intravenous Once Fili Martin M.D. 25 mL/hr at 04/22/19 0603 2 g at 04/22/19 0603    And   • [START ON 4/23/2019] magnesium oxide (MAG-OX) tablet 400 mg  400 mg Enteral Tube BID Fili Martin M.D.       • cloNIDine (CATAPRES) tablet 0.1 mg  0.1 mg Enteral Tube Q HOUR PRN Fili Martin M.D.       • fentaNYL (SUBLIMAZE) injection  mcg   mcg Intravenous Q HOUR PRN Fili Martin M.D.       • MD Alert...PROPOFOL CRITICAL CARE PROTOCOL   Other PRN Fili Martin M.D.       • propofol (DIPRIVAN) injection  0-80 mcg/kg/min Intravenous Continuous Fili Martin M.D. 24.5 mL/hr at 04/22/19 0655 50 mcg/kg/min at 04/22/19 0655   • norepinephrine (LEVOPHED) 8 mg in  mL Infusion  0-30 mcg/min Intravenous Continuous Fili Martin M.D. 28.1 mL/hr at 04/22/19 0600 15 mcg/min at 04/22/19 0600   • NS infusion   Intravenous Continuous Bronson Mattson M.D. 83 mL/hr at 04/22/19 0622     • acetaminophen (TYLENOL) tablet 650 mg  650 mg Enteral Tube Q6HRS PRN Bronson Mattson M.D.       • ondansetron (ZOFRAN) syringe/vial injection 4 mg  4 mg Intravenous Q4HRS PRN Bronson Mattson M.D.       • ondansetron (ZOFRAN ODT) dispertab 4 mg  4 mg Enteral Tube Q4HRS PRN Bronson Mattson M.D.       • promethazine (PHENERGAN) tablet 12.5-25 mg  12.5-25 mg Enteral Tube Q4HRS PRN Bronson Mattson M.D.       • promethazine (PHENERGAN) suppository 12.5-25 mg  12.5-25 mg Rectal Q4HRS PRN Bronson Mattson M.D.       • prochlorperazine (COMPAZINE) injection 5-10 mg  5-10 mg Intravenous Q4HRS PRN Bronson Mattson M.D.       • potassium chloride SA (Kdur) tablet 40 mEq  40 mEq Oral  Once Fili Kay M.D.   Stopped at 04/22/19 0645       Allergies  Allergies   Allergen Reactions   • Sulfa Drugs Vomiting   • Toradol Vomiting   • Gabapentin      Bowel incontinence     • Amoxicillin Rash     Reported by NAIDA on arrival to ED    Pt states she takes PCN 10/3       Vital Signs last 24 hours  Temp:  [35.2 °C (95.4 °F)] 35.2 °C (95.4 °F)  Pulse:  [55-75] 65  Resp:  [16-22] 22  SpO2:  [97 %-100 %] 100 %    Physical Exam  Physical Exam   Constitutional: She appears well-developed and well-nourished. No distress.   Restless on ventilator while on propofol and fentanyl   HENT:   Head: Normocephalic and atraumatic.   ET tube in and OG tube placed   Eyes: Pupils are equal, round, and reactive to light. EOM are normal.   Neck: No JVD present. No thyromegaly present.   Cardiovascular: Normal rate and regular rhythm.    No murmur heard.  Pulmonary/Chest: No respiratory distress. She has no wheezes. She has no rales.   Mechanical breaths   Abdominal: She exhibits no distension. There is no tenderness. There is no rebound.   Musculoskeletal: Normal range of motion. She exhibits no edema.   Neurological: No cranial nerve deficit.   Sedated on ventilator restless, biting on tube, and trying to sit up.    Skin: Skin is warm. No rash noted. She is not diaphoretic. No erythema.   Psychiatric:   Unable to access.        Fluids    Intake/Output Summary (Last 24 hours) at 04/22/19 0718  Last data filed at 04/22/19 0600   Gross per 24 hour   Intake             1100 ml   Output             1000 ml   Net              100 ml       Laboratory  Recent Results (from the past 48 hour(s))   Renal Function Panel    Collection Time: 04/21/19  2:53 AM   Result Value Ref Range    Sodium 139 135 - 145 mmol/L    Potassium 4.1 3.6 - 5.5 mmol/L    Chloride 110 96 - 112 mmol/L    Co2 22 20 - 33 mmol/L    Glucose 106 (H) 65 - 99 mg/dL    Creatinine 0.59 0.50 - 1.40 mg/dL    Bun 15 8 - 22 mg/dL    Calcium 8.7 8.5 - 10.5 mg/dL     Phosphorus 4.3 2.5 - 4.5 mg/dL    Albumin 3.2 3.2 - 4.9 g/dL   MAGNESIUM    Collection Time: 04/21/19  2:53 AM   Result Value Ref Range    Magnesium 1.6 1.5 - 2.5 mg/dL   ESTIMATED GFR    Collection Time: 04/21/19  2:53 AM   Result Value Ref Range    GFR If African American >60 >60 mL/min/1.73 m 2    GFR If Non African American >60 >60 mL/min/1.73 m 2   CBC WITH DIFFERENTIAL    Collection Time: 04/22/19  2:25 AM   Result Value Ref Range    WBC 8.5 4.8 - 10.8 K/uL    RBC 4.11 (L) 4.20 - 5.40 M/uL    Hemoglobin 13.1 12.0 - 16.0 g/dL    Hematocrit 40.3 37.0 - 47.0 %    MCV 98.1 (H) 81.4 - 97.8 fL    MCH 31.9 27.0 - 33.0 pg    MCHC 32.5 (L) 33.6 - 35.0 g/dL    RDW 56.3 (H) 35.9 - 50.0 fL    Platelet Count 402 164 - 446 K/uL    MPV 10.0 9.0 - 12.9 fL    Neutrophils-Polys 47.20 44.00 - 72.00 %    Lymphocytes 34.20 22.00 - 41.00 %    Monocytes 15.60 (H) 0.00 - 13.40 %    Eosinophils 1.40 0.00 - 6.90 %    Basophils 1.10 0.00 - 1.80 %    Immature Granulocytes 0.50 0.00 - 0.90 %    Nucleated RBC 0.00 /100 WBC    Neutrophils (Absolute) 3.99 2.00 - 7.15 K/uL    Lymphs (Absolute) 2.89 1.00 - 4.80 K/uL    Monos (Absolute) 1.32 (H) 0.00 - 0.85 K/uL    Eos (Absolute) 0.12 0.00 - 0.51 K/uL    Baso (Absolute) 0.09 0.00 - 0.12 K/uL    Immature Granulocytes (abs) 0.04 0.00 - 0.11 K/uL    NRBC (Absolute) 0.00 K/uL   CMP    Collection Time: 04/22/19  2:25 AM   Result Value Ref Range    Sodium 140 135 - 145 mmol/L    Potassium 3.7 3.6 - 5.5 mmol/L    Chloride 107 96 - 112 mmol/L    Co2 20 20 - 33 mmol/L    Anion Gap 13.0 (H) 0.0 - 11.9    Glucose 104 (H) 65 - 99 mg/dL    Bun 13 8 - 22 mg/dL    Creatinine 0.89 0.50 - 1.40 mg/dL    Calcium 9.3 8.5 - 10.5 mg/dL    AST(SGOT) 48 (H) 12 - 45 U/L    ALT(SGPT) 46 2 - 50 U/L    Alkaline Phosphatase 124 (H) 30 - 99 U/L    Total Bilirubin 0.2 0.1 - 1.5 mg/dL    Albumin 4.4 3.2 - 4.9 g/dL    Total Protein 7.6 6.0 - 8.2 g/dL    Globulin 3.2 1.9 - 3.5 g/dL    A-G Ratio 1.4 g/dL   LIPASE    Collection  Time: 04/22/19  2:25 AM   Result Value Ref Range    Lipase 81 11 - 82 U/L   Salicylate    Collection Time: 04/22/19  2:25 AM   Result Value Ref Range    Salicylates, Quant. 0 (L) 15 - 25 mg/dL   ACETAMINOPHEN    Collection Time: 04/22/19  2:25 AM   Result Value Ref Range    Acetaminophen -Tylenol <10 10 - 30 ug/mL   BETA-HCG QUALITATIVE SERUM    Collection Time: 04/22/19  2:25 AM   Result Value Ref Range    Beta-Hcg Qualitative Serum Negative Negative   URINALYSIS,CULTURE IF INDICATED    Collection Time: 04/22/19  2:25 AM   Result Value Ref Range    Color Yellow     Character Clear     Specific Gravity 1.008 <1.035    Ph 5.5 5.0 - 8.0    Glucose Negative Negative mg/dL    Ketones Negative Negative mg/dL    Protein Negative Negative mg/dL    Bilirubin Negative Negative    Urobilinogen, Urine 0.2 Negative    Nitrite Negative Negative    Leukocyte Esterase Negative Negative    Occult Blood Negative Negative    Micro Urine Req see below    Triglycerides Starting now and then Every 3 Days    Collection Time: 04/22/19  2:25 AM   Result Value Ref Range    Triglycerides 394 (H) 0 - 149 mg/dL   DIAGNOSTIC ALCOHOL    Collection Time: 04/22/19  2:25 AM   Result Value Ref Range    Diagnostic Alcohol 0.31 (H) 0.00 g/dL   ESTIMATED GFR    Collection Time: 04/22/19  2:25 AM   Result Value Ref Range    GFR If African American >60 >60 mL/min/1.73 m 2    GFR If Non African American >60 >60 mL/min/1.73 m 2   ISTAT ARTERIAL BLOOD GAS    Collection Time: 04/22/19  2:44 AM   Result Value Ref Range    Ph 7.223 (LL) 7.400 - 7.500    Pco2 46.6 (H) 26.0 - 37.0 mmHg    Po2 403 (H) 64 - 87 mmHg    Tco2 21 20 - 33 mmol/L    S02 100 (H) 93 - 99 %    Hco3 19.2 17.0 - 25.0 mmol/L    BE -8 (L) -4 - 3 mmol/L    Body Temp 35.9 C degrees    O2 Therapy 100 %    iPF Ratio 403     Ph Temp Whit 7.238 (LL) 7.400 - 7.500    Pco2 Temp Co 44.4 (H) 26.0 - 37.0 mmHg    Po2 Temp Cor 397 (H) 64 - 87 mmHg    Specimen Arterial     Action Range Triggered YES      Inst. Qualified Patient YES    EKG    Collection Time: 19  2:51 AM   Result Value Ref Range    Report       [facility] Emergency Dept.    Test Date:  2019  Pt Name:    LAKSHMI DARLING              Department: ER  MRN:        [id number]                      Room:        12  Gender:     Female                       Technician: [id number]  :        [id number]                   Requested By:IRENA FLORES  Order #:    [id number]                    Reading MD: Irena Flores MD    Measurements  Intervals                                Axis  Rate:       68                           P:          60  NC:         176                          QRS:        46  QRSD:       96                           T:          35  QT:         424  QTc:        451    Interpretive Statements  SINUS RHYTHM  Compared to ECG 2019 18:06:26      Electronically Signed On 2019 5:18:38 PDT by Irena Flores MD         Imaging  CT-HEAD W/O   Final Result      1. Cerebral atrophy.   2. White matter lucencies most consistent with small vessel ischemic change versus demyelination or gliosis.   3. Otherwise, Head CT without contrast with no acute findings. No evidence of acute cerebral infarction, hemorrhage or mass lesion.      DX-CHEST-PORTABLE (1 VIEW)   Final Result      1.  No visible pneumothorax following RIGHT neck catheter placement   2.  Bibasilar underinflation atelectasis which could obscure an additional process. This has increased.      DX-CHEST-PORTABLE (1 VIEW)   Final Result      Line and tube position as described above          Assessment/Plan  * Acute respiratory failure (HCC)   Assessment & Plan    Intubated date:   Goal saturation > 92%    Monitor pulse oximetry and adjust peep and FIO2 to abg/vbg, and peep optimization.  Monitor ventilator waveforms and titrate flow/peep and volumes according.   CXR: monitor lung volumes and tube/line placement  VAP bundle prevention, oral care, post pyloric  "feeding  Head of bed > 30 degree  GI prophylaxis  Daily awakening and SBT trials unless contraindicated  Monitor for liberation/extubation    Respiratory treatments: prn    Bronchoscopy on 4/22 very benign clear Xray can follow up BAL.            Alcohol intoxication with delirium and withdrawal (HCC)- (present on admission)   Assessment & Plan    CIWA/RASS protocol  Serial neuro exams    MVI, folate, thiamine aka \"ralley bag\"  Aggressive replace magnesium, potassium, phosphorus  Watch for starvation/alcoholic ketoacidosis and refeeding syndrome    Seguny's discriminant function (>32 steroid rx):low risk    Will start phenobarb protocol per ER she is usually talking and and walking at an EtOH level of 0.4   High risk for severe EtOH withdrawal.          History of suicide attempt- (present on admission)   Assessment & Plan    Hx of in past and now again.     Psychiatry and suicide precautions.      Overweight (BMI 25.0-29.9)   Assessment & Plan    Recommend weight and diet modification     Acute encephalopathy   Assessment & Plan    Likely related to Benzo's and alcohol intoxication  Ct head negative  Follow up with ammonia level, u/a.     Overdose- (present on admission)   Assessment & Plan    Patient with overdose of Benzo's   Supportive care, suicide precautions and psychiatric evaluation.          Discussed patient condition and risk of morbidity and/or mortality with Hospitalist, RN, RT and Pharmacy.    The patient remains critically ill from hypoxic respirator failure and drug overdose requiring ventilator support, phenobarb and norepinephrine for stablization.  Critical care time = 85 minutes in directly providing and coordinating critical care and extensive data review.  No time overlap and excludes procedures.      "

## 2019-04-22 NOTE — ASSESSMENT & PLAN NOTE
Wean sedation as tolerates  Cessation of alcohol discussed 4/23  Patient states she has resources to help with cessatoin  Thiamine, folate, MVI

## 2019-04-22 NOTE — ED PROVIDER NOTES
ED Provider Note    Scribed for Irena Flores M.D. by Lisa Silvestre. 4/22/2019, 2:03 AM.    Means of arrival: ambulance  History obtained from: EMS  History limited by: none    CHIEF COMPLAINT  Chief Complaint   Patient presents with   • Alcohol Intoxication   • Suicide Attempt       HPI  Ashleigh Marquis is a 58 y.o. female who presents to the Emergency Department via ambulance due to altered level of consciousness secondary to alcohol intoxication. She was found down in the middle of the street.  Per EMS patient told him that she had been drinking alcohol and ingested clonazepam.  She reportedly told EMS that this was with intent to harm herself.  Initially she was fairly awake but appeared intoxicated for EMS.  However on arrival patient decompensated and became more altered and therefore further history could not be obtained    Per chart review patient is well-known to this emergency department.  She was just recently admitted and discharged for alcohol withdrawal.  She was discharged 2 days ago.     REVIEW OF SYSTEMS  Review of Systems   Constitutional:        Alcohol intoxication    Neurological:        Altered level of consciousness    Review of systems limited secondary to altered mental status    Below history obtained from chart review      PAST MEDICAL HISTORY   has a past medical history of Alcoholism ([facility]); Anxiety; Arthritis; ASTHMA; Cancer (HCC) (1981); Chronic low back pain; Congestive heart failure (HCC); Depression; EtOH dependence ([facility]); Fall; GERD (gastroesophageal reflux disease); HTN; Hypertension; Indigestion; Muscle disorder; OSTEOPOROSIS; Other specified symptom associated with female genital organs; Psychiatric disorder; PTSD (post-traumatic stress disorder); Renal disorder; Seizure (HCC); Ulcer; and Vitamin D deficiency.    SURGICAL HISTORY   has a past surgical history that includes hernia repair (1977); cysto stent placemnt pre surg (10/7/2010); cystoscopy stent placement  "(11/9/2010); ureteroscopy (11/9/2010); lasertripsy (11/9/2010); stent removal (11/9/2010); and gastroscopy-endo (N/A, 10/3/2018).    SOCIAL HISTORY  Social History   Substance Use Topics   • Smoking status: Current Every Day Smoker     Packs/day: 0.50     Years: 20.00     Types: Cigarettes   • Smokeless tobacco: Never Used      Comment: 1/2 pack per day   • Alcohol use Yes      Comment: vodka, heavy use      History   Drug Use No       FAMILY HISTORY  Family History   Problem Relation Age of Onset   • Heart Disease Father    • Hypertension Father    • Diabetes Father    • Cancer Paternal Grandmother    • Depression Other    • Lung Disease Neg Hx    • Stroke Neg Hx        CURRENT MEDICATIONS  Home Medications    See nursing note         ALLERGIES  Allergies   Allergen Reactions   • Sulfa Drugs Vomiting   • Toradol Vomiting   • Gabapentin      Bowel incontinence     • Amoxicillin Rash     Reported by NAIDA on arrival to ED    Pt states she takes PCN 10/3       PHYSICAL EXAM  VITAL SIGNS: Pulse (!) 55   Ht 1.702 m (5' 7\")   Wt 81.7 kg (180 lb 1.9 oz)   LMP 11/02/2015   SpO2 100%   BMI 28.21 kg/m²   Vitals reviewed by myself.  Physical Exam  Nursing note and vitals reviewed.  Constitutional: Patient is obtunded, difficult to arouse  HENT: Head is normocephalic and atraumatic.  Eyes: extra-ocular movements intact  Cardiovascular: regular rate and  regular rhythm. No murmur heard.  Pulmonary/Chest: Breath sounds normal. No wheezes or rales.   Abdominal: Soft and non-tender. No distention.    Musculoskeletal: Extremities exhibit normal range of motion without edema or tenderness.   Neurological: Awake and alert  Skin: Skin is warm and dry. No rash.           DIAGNOSTIC STUDIES /  LABS  Labs Reviewed   CBC WITH DIFFERENTIAL - Abnormal; Notable for the following:        Result Value    RBC 4.11 (*)     MCV 98.1 (*)     MCHC 32.5 (*)     RDW 56.3 (*)     Monocytes 15.60 (*)     Monos (Absolute) 1.32 (*)     All other " components within normal limits    Narrative:     While on propofol   COMP METABOLIC PANEL - Abnormal; Notable for the following:     Anion Gap 13.0 (*)     Glucose 104 (*)     AST(SGOT) 48 (*)     Alkaline Phosphatase 124 (*)     All other components within normal limits    Narrative:     While on propofol   SALICYLATE - Abnormal; Notable for the following:     Salicylates, Quant. 0 (*)     All other components within normal limits   TRIGLYCERIDE - Abnormal; Notable for the following:     Triglycerides 394 (*)     All other components within normal limits    Narrative:     While on propofol   DIAGNOSTIC ALCOHOL - Abnormal; Notable for the following:     Diagnostic Alcohol 0.31 (*)     All other components within normal limits   ISTAT ARTERIAL BLOOD GAS - Abnormal; Notable for the following:     Ph 7.223 (*)     Pco2 46.6 (*)     Po2 403 (*)     S02 100 (*)     BE -8 (*)     Ph Temp Whit 7.238 (*)     Pco2 Temp Co 44.4 (*)     Po2 Temp Cor 397 (*)     All other components within normal limits   LIPASE    Narrative:     While on propofol   ACETAMINOPHEN    Narrative:     While on propofol   HCG QUAL SERUM    Narrative:     While on propofol   URINALYSIS,CULTURE IF INDICATED   ESTIMATED GFR    Narrative:     While on propofol   ARTERIAL BLOOD GAS   PROTHROMBIN TIME   APTT   URINALYSIS   AMMONIA   MAGNESIUM   PHOSPHORUS   TRIGLYCERIDE       All labs reviewed by me.    EKG Interpretation:  Interpreted by myself    12 Lead EKG interpreted by me to show:  EKG at 0251: Normal sinus rhythm, heart rate 68, normal axis, normal intervals, , QRS 96, QTc 451, no acute ST-T segment changes, no evidence of acute arrhythmia or ischemia  My impression of this EKG: Does not indicate ischemia or arrhythmia at this time.    RADIOLOGY  DX-CHEST-PORTABLE (1 VIEW)   Final Result      Line and tube position as described above      CT-HEAD W/O    (Results Pending)   DX-CHEST-PORTABLE (1 VIEW)    (Results Pending)   DX-CHEST-PORTABLE  (1 VIEW)    (Results Pending)     The radiologist's interpretation of all radiological studies have been reviewed by me.      PROCEDURES  ***  Intubation Procedure Note    Indication: hypoxia    Consent: Unable to be obtained due to patient's condition.    Medications Used: etomidate intravenously and succinycholine intravenously    Procedure: The patient was placed in the appropriate position.  Cricoid pressure was not required.  Intubation was performed video laryngoscopy using a glidescope a 7.0 cuffed endotracheal tube.  The cuff was then inflated and the tube was secured appropriately at a distance of 23 cm to the dental ridge.  Initial confirmation of placement included bilateral breath sounds, an end tidal CO2 detector, adequate chest rise and adequate pulse oximetry reading.  A chest x-ray to verify correct placement of the tube showed appropriate tube position.    The patient tolerated the procedure well.     Complications: None      CRITICAL CARE  The very real possibilty of a deterioration of this patient's condition required the highest level of my preparedness for sudden, emergent intervention.  I provided critical care services, which included medication orders, frequent reevaluations of the patient's condition and response to treatment, ordering and reviewing test results, and discussing the case with various consultants.  The critical care time associated with the care of the patient was 35 minutes. Review chart for interventions. This time is exclusive of any other billable procedures.       REASSESSMENT  2:03 AM - I evaluated patient at bedside.  I informed them of my plan of care today given their presentation. The patient has given consent for these procedures and understands the plan of care.     2:29 AM - Chest X-ray shows successful intubation and proper placement of ET tube.     2:37 AM - The patient was agitated, so I started her on a propofol drip along with the fentanyl drip.     3:15 AM -  Patient was noted to be hypotensive at this time. Held all sedation and started fluids.     3:45 AM - Patient still hypotensive, intensivist Dr. Martin at bedside. Patient started on peripheral Levophed and central line will be placed by Dr. Martin. Still awaiting head CT. Will take patient after she is stabilized.     4:10 AM I discussed the patient's case and the above findings with Dr. Mattson (hospitalist) who will accept the patient for admission to the ICU.       COURSE & MEDICAL DECISION MAKING  Nursing notes, VS, PMSFHx reviewed in chart.    Patient is a ***      FINAL IMPRESSION  1. History of suicide attempt    2. Alcohol withdrawal syndrome without complication (HCC)    3. Intentional drug overdose, initial encounter (HCC)     35 minutes of critical care time performed     ILisa (Scribe), am scribing for, and in the presence of, Irena Flores M.D..    Electronically signed by: Lisa Silvestre (Scribe), 4/22/2019    I, Irena Flores M.D. personally performed the services described in this documentation, as scribed by Lisa Silvestre in my presence, and it is both accurate and complete.    C    {ERP Attestation (ERP ONLY):[id number]}

## 2019-04-22 NOTE — PROGRESS NOTES
Patient transported to T614 on transport monitor and transport vent with RN, RT amd student RN. Patient in bilateral soft wrist restraints.

## 2019-04-22 NOTE — PROGRESS NOTES
Critical Care Progress Note    Date of admission  4/22/2019    Chief Complaint  58 y.o. female admitted 4/22/2019 with benzo o/d and hypoxic resp failure    Hospital Course    58 y.o. female who presented 4/22/2019 with alcohol abuse, chronic pain, depression, PTSD, gerd, CHF. That was found in the middle of road by EMS and brought to [facility]. She expressed she took 10 benzo's tablets and suicidal ideation. She was found with saturation of 77% in ER was placed on NRB but then became more somnolent so was intubated for airway protection. When I meet patient she is restless on high dose on propofol and fentanyl gtts. She has no signs of trauma. Her vitals or stable when backing down on propofol. Labs were unremarkable, other then CO2 22, AG 13, mg 1.6, EtOH 0.31 tylenol and aspirin negative. Ct head was unremarkable and as well was CXR. Ekg was SNR at 68 with no acute findings. Bedside Echo with good BiV function.      Interval Problem Update  Reviewed last 24 hour events:  Tm 95.4  +100cc over last 24hr, +100cc since admit  cxr bb atx  K 3.7  Abg 7.22/46/403/100 on 100    BAL 4/22 in process    Levophed @ 15  NS @ 83  Propofol @ 50    Last bm pta    Review of Systems  Review of Systems   Unable to perform ROS: Acuity of condition        Vital Signs for last 24 hours   Temp:  [35.2 °C (95.4 °F)] 35.2 °C (95.4 °F)  Pulse:  [55-75] 65  Resp:  [16-22] 22  SpO2:  [97 %-100 %] 100 %    Hemodynamic parameters for last 24 hours       Respiratory Information for the last 24 hours  [place] Vent Mode: APVCMV  Rate (breaths/min): 22  Vt Target (mL): 400  PEEP/CPAP: 8  FiO2: 40  P MEAN: 12  Control VTE (exp VT): 399    Physical Exam   Physical Exam   Constitutional: She appears well-developed and well-nourished. She appears distressed.   HENT:   Head: Normocephalic and atraumatic.   Eyes: Pupils are equal, round, and reactive to light. Conjunctivae are normal.   Neck: No tracheal deviation present.   Cardiovascular: Normal rate  and intact distal pulses.    Pulmonary/Chest: She has no wheezes. She has no rales.   Diminished   Abdominal: Soft. She exhibits no distension. There is no tenderness.   Musculoskeletal: She exhibits no edema.   Neurological: No cranial nerve deficit.   Skin: Skin is warm and dry.   Psychiatric:   Sedated   Nursing note and vitals reviewed.      Medications  Current Facility-Administered Medications   Medication Dose Route Frequency Provider Last Rate Last Dose   • NOREPINEPHRINE BITARTRATE 1 MG/ML IV SOLN            • Respiratory Care per Protocol   Nebulization Continuous RT Fili Martin M.D.       • famotidine (PEPCID) tablet 20 mg  20 mg Enteral Tube Q12HRS Fili Martin M.D.        Or   • famotidine (PEPCID) injection 20 mg  20 mg Intravenous Q12HRS Fili Martin M.D.   20 mg at 04/22/19 0604   • senna-docusate (PERICOLACE or SENOKOT S) 8.6-50 MG per tablet 2 Tab  2 Tab Enteral Tube BID Fili Martin M.D.   Stopped at 04/22/19 0600    And   • polyethylene glycol/lytes (MIRALAX) PACKET 1 Packet  1 Packet Enteral Tube QDAY PRN Fili Martin M.D.        And   • magnesium hydroxide (MILK OF MAGNESIA) suspension 30 mL  30 mL Enteral Tube QDAY PRN Fili Martin M.D.        And   • bisacodyl (DULCOLAX) suppository 10 mg  10 mg Rectal QDAY PRN Fili Martin M.D.       • MD Alert...ICU Electrolyte Replacement per Pharmacy   Other PHARMACY TO DOSE Fili Martin M.D.       • Pharmacy Consult: Enteral tube insertion - review meds/change route/product selection   Other PHARMACY TO DOSE Fili Martin M.D.       • enoxaparin (LOVENOX) inj 40 mg  40 mg Subcutaneous DAILY Fili Martin M.D.   40 mg at 04/22/19 0604   • thiamine (B-1) 250 mg in D5W 100 mL IVPB  250 mg Intravenous Q8HRS Fili Martin M.D. 200 mL/hr at 04/22/19 0652 250 mg at 04/22/19 0652   • Pharmacy Consult Request - Benzodiazepine review   Other PHARMACY TO DOSE Fili Martin M.D.       • PHENObarbital injection  130 mg  130 mg Intravenous Q30 MIN PRN Fili Martin M.D.        And   • PHENObarbital injection 260 mg  260 mg Intravenous Q30 MIN PRN Fili Martin M.D.       • magnesium sulfate IVPB premix 2 g  2 g Intravenous Once Fili Martin M.D. 25 mL/hr at 04/22/19 0603 2 g at 04/22/19 0603    And   • [START ON 4/23/2019] magnesium oxide (MAG-OX) tablet 400 mg  400 mg Enteral Tube BID Fili Martin M.D.       • cloNIDine (CATAPRES) tablet 0.1 mg  0.1 mg Enteral Tube Q HOUR PRN Fili Martin M.D.       • fentaNYL (SUBLIMAZE) injection  mcg   mcg Intravenous Q HOUR PRN Fili Martin M.D.       • MD [place]...PROPOFOL CRITICAL CARE PROTOCOL   Other PRN Fili Martin M.D.       • propofol (DIPRIVAN) injection  0-80 mcg/kg/min Intravenous Continuous Fili Martin M.D. 24.5 mL/hr at 04/22/19 0655 50 mcg/kg/min at 04/22/19 0655   • norepinephrine (LEVOPHED) 8 mg in  mL Infusion  0-30 mcg/min Intravenous Continuous Fili Martin M.D. 28.1 mL/hr at 04/22/19 0600 15 mcg/min at 04/22/19 0600   • NS infusion   Intravenous Continuous Bronson Mattson M.D. 83 mL/hr at 04/22/19 0622     • acetaminophen (TYLENOL) tablet 650 mg  650 mg Enteral Tube Q6HRS PRN Bronson Mattson M.D.       • ondansetron (ZOFRAN) syringe/vial injection 4 mg  4 mg Intravenous Q4HRS PRN Bronson Mattson M.D.       • ondansetron (ZOFRAN ODT) dispertab 4 mg  4 mg Enteral Tube Q4HRS PRN Bronson Mattson M.D.       • promethazine (PHENERGAN) tablet 12.5-25 mg  12.5-25 mg Enteral Tube Q4HRS PRN Bronson Mattson M.D.       • promethazine (PHENERGAN) suppository 12.5-25 mg  12.5-25 mg Rectal Q4HRS PRN Bronson Mattson M.D.       • prochlorperazine (COMPAZINE) injection 5-10 mg  5-10 mg Intravenous Q4HRS PRN Bronson Mattson M.D.       • potassium chloride SA (Kdur) tablet 40 mEq  40 mEq Oral Once Fili Kay M.D.   Stopped at 04/22/19 0645       Fluids    Intake/Output Summary (Last 24 hours) at 04/22/19 0721  Last data  filed at 04/22/19 0600   Gross per 24 hour   Intake             1100 ml   Output             1000 ml   Net              100 ml       Laboratory  Recent Labs      04/22/19   0244   ISTATAPH  7.223*   ISTATAPCO2  46.6*   ISTATAPO2  403*   ISTATATCO2  21   [id number]  100*   ISTATARTHCO3  19.2   ISTATARTBE  -8*   ISTATTEMP  35.9 C   ISTATFIO2  100   ISTATSPEC  Arterial   ISTATAPHTC  7.238*   [id number]  397*         Recent Labs      04/20/19 0412  04/21/19 0253  04/22/19 0225   SODIUM  140  139  140   POTASSIUM  4.5  4.1  3.7   CHLORIDE  112  110  107   CO2  20 22 20   BUN  11  15  13   CREATININE  0.56  0.59  0.89   MAGNESIUM  1.6  1.6   --    PHOSPHORUS  3.8  4.3   --    CALCIUM  8.7  8.7  9.3     Recent Labs      04/20/19 0412 04/21/19 0253 04/22/19 0225   ALTSGPT   --    --   46   ASTSGOT   --    --   48*   ALKPHOSPHAT   --    --   124*   TBILIRUBIN   --    --   0.2   LIPASE   --    --   81   GLUCOSE  98  106*  104*     Recent Labs      04/22/19 0225   WBC  8.5   NEUTSPOLYS  47.20   LYMPHOCYTES  34.20   MONOCYTES  15.60*   EOSINOPHILS  1.40   BASOPHILS  1.10   ASTSGOT  48*   ALTSGPT  46   ALKPHOSPHAT  124*   TBILIRUBIN  0.2     Recent Labs      04/22/19 0225   RBC  4.11*   HEMOGLOBIN  13.1   HEMATOCRIT  40.3   PLATELETCT  402       Imaging  X-Ray:  I have personally reviewed the images and compared with prior images.    Assessment/Plan  * Acute respiratory failure (HCC)   Assessment & Plan    Intubated date: 4/22  Continue full vent support  I am titrating ventilator based on ABG and pulmonary mechanics  RT/O2 protocols  Transition to Precedex from propofol  Follow BAL 4/22     Alcohol intoxication with delirium and withdrawal (HCC)- (present on admission)   Assessment & Plan    DC propofol and transition to Precedex  Continue MVI/folate/thiamine  Encourage cessation clinically appropriate  Continue telemetry monitoring  Aspiration and seizure precautions  Aggressive electrolyte replacement      History of suicide attempt- (present on admission)   Assessment & Plan    Hx of in past and now again.   Will need legal 2000 once extubated and psychiatric evaluation     Overweight (BMI 25.0-29.9)   Assessment & Plan    Recommend weight and diet modification     Acute encephalopathy   Assessment & Plan    Likely related to Benzo's and alcohol intoxication  Ct head negative     Overdose- (present on admission)   Assessment & Plan    Patient with overdose of Benzo's   Continue supportive care          VTE:  Lovenox  Ulcer: H2 Antagonist  Lines: Central Line  Ongoing indication addressed and Velásquez Catheter  Ongoing indication addressed    I have performed a physical exam and reviewed and updated ROS and Plan today (4/22/2019). In review of yesterday's note (4/21/2019), there are no changes except as documented above.     Discussed patient condition and risk of morbidity and/or mortality with Hospitalist, RN, RT, Therapies and Pharmacy  The patient remains critically ill.  Critical care time = 50 minutes in directly providing and coordinating critical care and extensive data review.  No time overlap and excludes procedures.

## 2019-04-22 NOTE — ED TRIAGE NOTES
Pt brought in by EMS ALOC, found in middle of street unconscious. Pt attempted suicide by taking unknown amount of benzo's, pt is a known alcoholic and also is intoxicated

## 2019-04-22 NOTE — CARE PLAN
Problem: Safety  Goal: Will remain free from falls    Intervention: Assess risk factors for falls  Full risk assessment completed.      Problem: Skin Integrity  Goal: Risk for impaired skin integrity will decrease    Intervention: Implement precautions to protect skin integrity in collaboration with the interdisciplinary team  Heel float boots placed. Waffle cushion unavailable. Full skin assessment complete.

## 2019-04-22 NOTE — RESPIRATORY CARE
Extubation    Cuff leak noted YES  Stridor present NO     FiO2%: 40 % (04/22/19 0800)  O2 (LPM): 3 (04/22/19 1255)  O2 Daily Delivery Respiratory : Silicone Nasal Cannula (04/22/19 1255)  Patient toleration - tolerated well with no distress    Events/Summary/Plan: pt extubated per Dr. Kay (04/22/19 1255)

## 2019-04-22 NOTE — PROGRESS NOTES
Contents of personal belongings searched and documented with this RN and Spike Kee RN. Patient belongings stored in the legal hold locker on this unit.

## 2019-04-22 NOTE — PROGRESS NOTES
Patient was very agitated, mouthing words, gesturing and reaching for tube. RT notified. Shawn SQUIRES came to bedside and performed extubation parameters. Dr. Kay notified by RT that patient is ready to extubate. Dr. Kay came to the bedside to assess patient. Orders received to extubate patient. Also received orders to perform a bedside swallow assessment an hour after extubation.     Patient extubated at 1255 and placed on 3L NC. Oxygen saturation is at 98%. Patient educated to not talk for an hour to prevent swelling/damage to her thoat.     Legal hold paperwork initiated by this RN.

## 2019-04-22 NOTE — PROGRESS NOTES
"Patient is alert and oriented. She answers \"no\" to all suicide columbia scale screening questions. However, she is still a \"moderate risk\" for suicide due to a suspected suicide attempt based on her comments to EMS. Patient states that she does remember making those statements but she does not know why she made those comments.    BECCA Donis at bedside.    Suicide Safety checklist in use.   "

## 2019-04-22 NOTE — CARE PLAN
Problem: Ventilation Defect:  Goal: Ability to achieve and maintain unassisted ventilation or tolerate decreased levels of ventilator support    Intervention: Support and monitor invasive and noninvasive mechanical ventilation  Adult Ventilation Update    Total Vent Days: 1    Patient Lines/Drains/Airways Status    Active Airway     Name: Placement date: Placement time: Site: Days:    Airway ETT Oral 7.5 04/22/19   0213   Oral   less than 1                 Events/Summary/Plan: Pt arrived from ER placed on vent (04/22/19 9623)

## 2019-04-22 NOTE — H&P
Hospital Medicine History & Physical Note    Date of Service  4/22/2019    Primary Care Physician  Pcp Pt States None    Consultants  Critical Care Mario    Code Status  Full code     Chief Complaint  Altered     History of Presenting Illness  58 y.o. female with history of alcoholism and prior history of withdrawal symptoms was apparently found passed out in the history and brought in by ambulance.  She has been nonresponsive, and did require intubation with mechanical ventilation on arrival.  She apparently has been drinking alcohol, and has ingested an unknown amount of benzodiazepines.  And route, emergency medical services noted that she did express suicidal ideations.  She can provide no history at this point.    Review of Systems  Review of Systems   Unable to perform ROS: Patient unresponsive       Past Medical History   has a past medical history of Alcoholism ([facility]); Anxiety; Arthritis; ASTHMA; Cancer ([facility]) (1981); Chronic low back pain; Congestive heart failure ([facility]); Depression; EtOH dependence ([facility]); Fall; GERD (gastroesophageal reflux disease); HTN; Hypertension; Indigestion; Muscle disorder; OSTEOPOROSIS; Other specified symptom associated with female genital organs; Psychiatric disorder; PTSD (post-traumatic stress disorder); Renal disorder; Seizure ([facility]); Ulcer; and Vitamin D deficiency. She also has no past medical history of Heart murmur.    Surgical History   has a past surgical history that includes hernia repair (1977); cysto stent placemnt pre surg (10/7/2010); cystoscopy stent placement (11/9/2010); ureteroscopy (11/9/2010); lasertripsy (11/9/2010); stent removal (11/9/2010); and gastroscopy-endo (N/A, 10/3/2018).     Family History  family history includes Cancer in her paternal grandmother; Depression in her other; Diabetes in her father; Heart Disease in her father; Hypertension in her father.     Social History   reports that she has been smoking Cigarettes.  She has a 10.00 pack-year  smoking history. She has never used smokeless tobacco. She reports that she drinks alcohol. She reports that she does not use drugs.    Allergies  Allergies   Allergen Reactions   • Sulfa Drugs Vomiting   • Toradol Vomiting   • Gabapentin      Bowel incontinence     • Amoxicillin Rash     Reported by NAIDA on arrival to ED    Pt states she takes PCN 10/3       Medications  Prior to Admission Medications   Prescriptions Last Dose Informant Patient Reported? Taking?   clonazePAM (KLONOPIN) 0.5 MG Tab  Patient Yes No   Sig: Take 0.5 mg by mouth every day.   lisinopril (PRINIVIL) 10 MG Tab  Patient No No   Sig: Take 1 Tab by mouth every day.   omeprazole (PRILOSEC) 20 MG delayed-release capsule  Patient Yes No   Sig: Take 20 mg by mouth every day.   spironolactone (ALDACTONE) 25 MG Tab  Patient Yes No   Sig: Take 25 mg by mouth every day.      Facility-Administered Medications: None       Physical Exam  Pulse:  [55-75] 55  SpO2:  [98 %-100 %] 100 %    Physical Exam   Constitutional: She appears well-developed and well-nourished. No distress.   Intubated on mechanically ventilated   HENT:   Head: Normocephalic and atraumatic.   Eyes: Pupils are equal, round, and reactive to light. Conjunctivae are normal.   Neck: Normal range of motion. Neck supple. No tracheal deviation present. No thyromegaly present.   Cardiovascular: Normal rate, regular rhythm and normal heart sounds.  Exam reveals no gallop and no friction rub.    No murmur heard.  Pulmonary/Chest: Breath sounds normal. She is in respiratory distress. She has no wheezes. She has no rales.   Abdominal: Soft. Bowel sounds are normal. She exhibits no distension. There is no tenderness. There is no rebound.   Musculoskeletal: Normal range of motion. She exhibits no edema.   Lymphadenopathy:     She has no cervical adenopathy.   Neurological: No cranial nerve deficit.   Skin: Skin is warm and dry. She is not diaphoretic.   Psychiatric: She has a normal mood and affect.    Nursing note and vitals reviewed.      Laboratory:  Recent Labs      04/22/19 0225   WBC  8.5   RBC  4.11*   HEMOGLOBIN  13.1   HEMATOCRIT  40.3   MCV  98.1*   MCH  31.9   MCHC  32.5*   RDW  56.3*   PLATELETCT  402   MPV  10.0     Recent Labs      04/20/19 0412 04/21/19 0253 04/22/19 0225   SODIUM  140  139  140   POTASSIUM  4.5  4.1  3.7   CHLORIDE  112  110  107   CO2  20 22 20   GLUCOSE  98  106*  104*   BUN  11  15  13   CREATININE  0.56  0.59  0.89   CALCIUM  8.7  8.7  9.3     Recent Labs      04/20/19 0412 04/21/19 0253 04/22/19 0225   ALTSGPT   --    --   46   ASTSGOT   --    --   48*   ALKPHOSPHAT   --    --   124*   TBILIRUBIN   --    --   0.2   LIPASE   --    --   81   GLUCOSE  98  106*  104*             Recent Labs      04/22/19 0225   TRIGLYCERIDE  394*     No results for input(s): TROPONINI in the last 72 hours.    Urinalysis:    Recent Labs      04/22/19 0225   SPECGRAVITY  1.008   GLUCOSEUR  Negative   KETONES  Negative   NITRITE  Negative   LEUKESTERAS  Negative        Imaging:  DX-CHEST-PORTABLE (1 VIEW)   Final Result      Line and tube position as described above      CT-HEAD W/O    (Results Pending)   DX-CHEST-PORTABLE (1 VIEW)    (Results Pending)   DX-CHEST-PORTABLE (1 VIEW)    (Results Pending)   DX-CHEST-PORTABLE (1 VIEW)    (Results Pending)   DX-CHEST-PORTABLE (1 VIEW)    (Results Pending)         Assessment/Plan:  I anticipate this patient will require at least two midnights for appropriate medical management, necessitating inpatient admission.    * Acute respiratory failure (HCC)   Assessment & Plan    Likely both hypoxic and hypercarbic, in the setting of altered mentation, abusive substance overdose.  We will continue with mechanical ventilation as per intensive care.     Alcohol intoxication with delirium and withdrawal (HCC)- (present on admission)   Assessment & Plan    Ongoing.  Patient currently sedated with propofol and fentanyl infusions.  Monitor  closely.     Alcoholism (HCC)   Assessment & Plan    Ongoing.     Overweight (BMI 25.0-29.9)   Assessment & Plan    Body mass index is 28.21 kg/m².       Acute encephalopathy   Assessment & Plan    Likely due to polysubstance abuse, with possible overdose of the same.  Due to associated obtundation, patient required intubation with mechanical ventilation on arrival.         VTE prophylaxis: SCD, lovenox

## 2019-04-22 NOTE — PROGRESS NOTES
Patient admitted this morning by Dr. Mattson with suspected overdose and acute respiratory failure intubated, discussed with Dr. Kay and nursing staff

## 2019-04-22 NOTE — PROGRESS NOTES
Notified Dr. Kay that patient passed her bedside swallow assessment. Orders received to order a regular diet for patient. Ok also per Dr. Kay to remove patient's james. Orders read back to MD and implemented.

## 2019-04-22 NOTE — PROCEDURES
Date: 4/22/2019    Procedure: Bronchoscopy with Therapeutic suctioning and BAL    Indication: Infiltrate/atlectasis    Physician:  Fili Martin M.D.    Consent:  emergent    Procedure:  A time out occurred with the patient name, medical record number, allergies, and consent with right procedure and indication with bedside nurse and if able the patient. The patient was connected to a monitor and had continuous pulse oximeter. The patient was sedated with etomidate 15 mg of propofol gtt and fentanyl 150 mcg/hr. The bronchoscope was insert down the left and right bronchi to the terminal bronchioles. Therapeutic suction of secretion was provided. A BAL was preformed in the RML after injecting with small aliquots to a total of 20ml. The patient tolerated the procedure without any immediate complications with minimal <5ml of blood loss.     Findings benign airways with no concern of aspirated content no significant mucuous.     Fili Martin MD  Critical Care Medicine

## 2019-04-23 ENCOUNTER — APPOINTMENT (OUTPATIENT)
Dept: RADIOLOGY | Facility: MEDICAL CENTER | Age: 59
DRG: 917 | End: 2019-04-23
Attending: INTERNAL MEDICINE
Payer: MEDICAID

## 2019-04-23 LAB
ANION GAP SERPL CALC-SCNC: 3 MMOL/L (ref 0–11.9)
BASOPHILS # BLD AUTO: 1.4 % (ref 0–1.8)
BASOPHILS # BLD: 0.08 K/UL (ref 0–0.12)
BUN SERPL-MCNC: 11 MG/DL (ref 8–22)
CALCIUM SERPL-MCNC: 8.7 MG/DL (ref 8.5–10.5)
CHLORIDE SERPL-SCNC: 109 MMOL/L (ref 96–112)
CO2 SERPL-SCNC: 24 MMOL/L (ref 20–33)
CREAT SERPL-MCNC: 0.63 MG/DL (ref 0.5–1.4)
EOSINOPHIL # BLD AUTO: 0.14 K/UL (ref 0–0.51)
EOSINOPHIL NFR BLD: 2.4 % (ref 0–6.9)
ERYTHROCYTE [DISTWIDTH] IN BLOOD BY AUTOMATED COUNT: 57.4 FL (ref 35.9–50)
GLUCOSE SERPL-MCNC: 74 MG/DL (ref 65–99)
HCT VFR BLD AUTO: 33.1 % (ref 37–47)
HGB BLD-MCNC: 10.7 G/DL (ref 12–16)
IMM GRANULOCYTES # BLD AUTO: 0.02 K/UL (ref 0–0.11)
IMM GRANULOCYTES NFR BLD AUTO: 0.3 % (ref 0–0.9)
LYMPHOCYTES # BLD AUTO: 1.73 K/UL (ref 1–4.8)
LYMPHOCYTES NFR BLD: 29.9 % (ref 22–41)
MAGNESIUM SERPL-MCNC: 1.6 MG/DL (ref 1.5–2.5)
MCH RBC QN AUTO: 32.7 PG (ref 27–33)
MCHC RBC AUTO-ENTMCNC: 32.3 G/DL (ref 33.6–35)
MCV RBC AUTO: 101.2 FL (ref 81.4–97.8)
MONOCYTES # BLD AUTO: 1.01 K/UL (ref 0–0.85)
MONOCYTES NFR BLD AUTO: 17.5 % (ref 0–13.4)
NEUTROPHILS # BLD AUTO: 2.8 K/UL (ref 2–7.15)
NEUTROPHILS NFR BLD: 48.5 % (ref 44–72)
NRBC # BLD AUTO: 0 K/UL
NRBC BLD-RTO: 0 /100 WBC
PHOSPHATE SERPL-MCNC: 4 MG/DL (ref 2.5–4.5)
PLATELET # BLD AUTO: 308 K/UL (ref 164–446)
PMV BLD AUTO: 10.1 FL (ref 9–12.9)
POTASSIUM SERPL-SCNC: 3.7 MMOL/L (ref 3.6–5.5)
RBC # BLD AUTO: 3.27 M/UL (ref 4.2–5.4)
SODIUM SERPL-SCNC: 136 MMOL/L (ref 135–145)
WBC # BLD AUTO: 5.8 K/UL (ref 4.8–10.8)

## 2019-04-23 PROCEDURE — 700102 HCHG RX REV CODE 250 W/ 637 OVERRIDE(OP): Performed by: INTERNAL MEDICINE

## 2019-04-23 PROCEDURE — A9270 NON-COVERED ITEM OR SERVICE: HCPCS | Performed by: HOSPITALIST

## 2019-04-23 PROCEDURE — 71045 X-RAY EXAM CHEST 1 VIEW: CPT

## 2019-04-23 PROCEDURE — 83735 ASSAY OF MAGNESIUM: CPT

## 2019-04-23 PROCEDURE — 80048 BASIC METABOLIC PNL TOTAL CA: CPT

## 2019-04-23 PROCEDURE — 99291 CRITICAL CARE FIRST HOUR: CPT | Performed by: INTERNAL MEDICINE

## 2019-04-23 PROCEDURE — A9270 NON-COVERED ITEM OR SERVICE: HCPCS | Performed by: INTERNAL MEDICINE

## 2019-04-23 PROCEDURE — 700111 HCHG RX REV CODE 636 W/ 250 OVERRIDE (IP): Performed by: INTERNAL MEDICINE

## 2019-04-23 PROCEDURE — 700105 HCHG RX REV CODE 258

## 2019-04-23 PROCEDURE — 99232 SBSQ HOSP IP/OBS MODERATE 35: CPT | Performed by: HOSPITALIST

## 2019-04-23 PROCEDURE — 85025 COMPLETE CBC W/AUTO DIFF WBC: CPT

## 2019-04-23 PROCEDURE — 700102 HCHG RX REV CODE 250 W/ 637 OVERRIDE(OP): Performed by: HOSPITALIST

## 2019-04-23 PROCEDURE — 700111 HCHG RX REV CODE 636 W/ 250 OVERRIDE (IP): Performed by: HOSPITALIST

## 2019-04-23 PROCEDURE — 84100 ASSAY OF PHOSPHORUS: CPT

## 2019-04-23 PROCEDURE — 770001 HCHG ROOM/CARE - MED/SURG/GYN PRIV*

## 2019-04-23 PROCEDURE — 700105 HCHG RX REV CODE 258: Performed by: INTERNAL MEDICINE

## 2019-04-23 RX ORDER — SODIUM CHLORIDE 9 MG/ML
INJECTION, SOLUTION INTRAVENOUS
Status: COMPLETED
Start: 2019-04-23 | End: 2019-04-23

## 2019-04-23 RX ORDER — THIAMINE MONONITRATE (VIT B1) 100 MG
100 TABLET ORAL DAILY
Status: DISCONTINUED | OUTPATIENT
Start: 2019-04-24 | End: 2019-04-24 | Stop reason: HOSPADM

## 2019-04-23 RX ORDER — LOPERAMIDE HYDROCHLORIDE 2 MG/1
2 CAPSULE ORAL 4 TIMES DAILY PRN
Status: DISCONTINUED | OUTPATIENT
Start: 2019-04-23 | End: 2019-04-24 | Stop reason: HOSPADM

## 2019-04-23 RX ORDER — OMEPRAZOLE 20 MG/1
20 CAPSULE, DELAYED RELEASE ORAL DAILY
Status: DISCONTINUED | OUTPATIENT
Start: 2019-04-23 | End: 2019-04-24 | Stop reason: HOSPADM

## 2019-04-23 RX ORDER — MAGNESIUM SULFATE HEPTAHYDRATE 40 MG/ML
2 INJECTION, SOLUTION INTRAVENOUS ONCE
Status: COMPLETED | OUTPATIENT
Start: 2019-04-23 | End: 2019-04-23

## 2019-04-23 RX ORDER — ALPRAZOLAM 0.25 MG/1
0.25 TABLET ORAL EVERY 6 HOURS PRN
Status: DISCONTINUED | OUTPATIENT
Start: 2019-04-23 | End: 2019-04-24 | Stop reason: HOSPADM

## 2019-04-23 RX ORDER — IBUPROFEN 400 MG/1
400 TABLET ORAL EVERY 6 HOURS PRN
Status: DISCONTINUED | OUTPATIENT
Start: 2019-04-23 | End: 2019-04-24 | Stop reason: HOSPADM

## 2019-04-23 RX ADMIN — ENOXAPARIN SODIUM 40 MG: 100 INJECTION SUBCUTANEOUS at 05:38

## 2019-04-23 RX ADMIN — LOPERAMIDE HYDROCHLORIDE 2 MG: 2 CAPSULE ORAL at 16:07

## 2019-04-23 RX ADMIN — MAGNESIUM OXIDE TAB 400 MG (241.3 MG ELEMENTAL MG) 400 MG: 400 (241.3 MG) TAB at 18:31

## 2019-04-23 RX ADMIN — ALPRAZOLAM 0.25 MG: 0.25 TABLET ORAL at 14:52

## 2019-04-23 RX ADMIN — MAGNESIUM SULFATE IN WATER 2 G: 40 INJECTION, SOLUTION INTRAVENOUS at 08:09

## 2019-04-23 RX ADMIN — OMEPRAZOLE 20 MG: 20 CAPSULE, DELAYED RELEASE ORAL at 09:35

## 2019-04-23 RX ADMIN — MAGNESIUM OXIDE TAB 400 MG (241.3 MG ELEMENTAL MG) 400 MG: 400 (241.3 MG) TAB at 05:38

## 2019-04-23 RX ADMIN — ACETAMINOPHEN 650 MG: 325 TABLET, FILM COATED ORAL at 20:59

## 2019-04-23 RX ADMIN — ACETAMINOPHEN 650 MG: 325 TABLET, FILM COATED ORAL at 08:08

## 2019-04-23 RX ADMIN — IBUPROFEN 400 MG: 400 TABLET, FILM COATED ORAL at 04:06

## 2019-04-23 RX ADMIN — SODIUM CHLORIDE: 9 INJECTION, SOLUTION INTRAVENOUS at 09:15

## 2019-04-23 RX ADMIN — ONDANSETRON 4 MG: 4 TABLET, ORALLY DISINTEGRATING ORAL at 11:45

## 2019-04-23 RX ADMIN — FAMOTIDINE 20 MG: 20 TABLET ORAL at 05:39

## 2019-04-23 RX ADMIN — ALPRAZOLAM 0.25 MG: 0.25 TABLET ORAL at 21:00

## 2019-04-23 RX ADMIN — IBUPROFEN 400 MG: 400 TABLET, FILM COATED ORAL at 14:54

## 2019-04-23 RX ADMIN — THIAMINE HYDROCHLORIDE 250 MG: 100 INJECTION, SOLUTION INTRAMUSCULAR; INTRAVENOUS at 05:38

## 2019-04-23 ASSESSMENT — LIFESTYLE VARIABLES
TOTAL SCORE: 4
EVER HAD A DRINK FIRST THING IN THE MORNING TO STEADY YOUR NERVES TO GET RID OF A HANGOVER: YES
HAVE YOU EVER FELT YOU SHOULD CUT DOWN ON YOUR DRINKING: YES
HAVE PEOPLE ANNOYED YOU BY CRITICIZING YOUR DRINKING: YES
TOTAL SCORE: 4
SUBSTANCE_ABUSE: 1
AVERAGE NUMBER OF DAYS PER WEEK YOU HAVE A DRINK CONTAINING ALCOHOL: 5
TOTAL SCORE: 4
ON A TYPICAL DAY WHEN YOU DRINK ALCOHOL HOW MANY DRINKS DO YOU HAVE: 5
EVER FELT BAD OR GUILTY ABOUT YOUR DRINKING: YES
CONSUMPTION TOTAL: POSITIVE
ALCOHOL_USE: YES
HOW MANY TIMES IN THE PAST YEAR HAVE YOU HAD 5 OR MORE DRINKS IN A DAY: 5

## 2019-04-23 ASSESSMENT — COGNITIVE AND FUNCTIONAL STATUS - GENERAL
SUGGESTED CMS G CODE MODIFIER MOBILITY: CH
MOBILITY SCORE: 24

## 2019-04-23 ASSESSMENT — ENCOUNTER SYMPTOMS
DOUBLE VISION: 0
NAUSEA: 1
DEPRESSION: 0
MYALGIAS: 1
BACK PAIN: 0
COUGH: 0
PALPITATIONS: 0
BLOOD IN STOOL: 0
SPUTUM PRODUCTION: 0
ABDOMINAL PAIN: 1
PHOTOPHOBIA: 0
SORE THROAT: 0
CHILLS: 0
SHORTNESS OF BREATH: 0
DEPRESSION: 1
NAUSEA: 0
FEVER: 0
DIARRHEA: 1
ABDOMINAL PAIN: 0
HEADACHES: 0
VOMITING: 0
FOCAL WEAKNESS: 0
DIZZINESS: 0

## 2019-04-23 NOTE — PROGRESS NOTES
[facility] Medicine Daily Progress Note    Date of Service  4/23/2019    Chief Complaint  Altered mental status    Hospital Course    58 y.o. female w/ hx of EtOH admitted 4/22/2019 with being found down and required initial intubation.  Now extubated..        Interval Problem Update  Awake  Weak  Discussed need of alcohol cessation.  Patient states she wants to stop drinking.  Sitter at bedside.    Consultants/Specialty  Pulmonary    Code Status  FULL    Disposition  Transfer to medical floor    Review of Systems  Review of Systems   Constitutional: Positive for malaise/fatigue. Negative for fever.   HENT: Negative for sore throat.    Eyes: Negative for double vision.   Respiratory: Negative for cough and shortness of breath.    Cardiovascular: Negative for chest pain and leg swelling.   Gastrointestinal: Positive for abdominal pain and diarrhea. Negative for blood in stool, melena and nausea.   Genitourinary: Negative for dysuria.   Musculoskeletal: Positive for myalgias. Negative for back pain.   Neurological: Negative for dizziness and headaches.   Psychiatric/Behavioral: Positive for depression and substance abuse.        Physical Exam  Temp:  [35.9 °C (96.6 °F)-36.7 °C (98 °F)] 36.7 °C (98 °F)  Pulse:  [66-87] 81  Resp:  [12-27] 20  SpO2:  [88 %-97 %] 95 %    Physical Exam   Constitutional: She is oriented to person, place, and time. She appears well-developed and well-nourished. No distress.   HENT:   Head: Normocephalic and atraumatic.   Nose: Nose normal.   Mouth/Throat: No oropharyngeal exudate.   Eyes: Conjunctivae and EOM are normal. Right eye exhibits no discharge. Left eye exhibits no discharge.   Neck: No tracheal deviation present.   Cardiovascular: Normal rate, regular rhythm and normal heart sounds.    No murmur heard.  Pulses:       Radial pulses are 2+ on the right side, and 2+ on the left side.        Dorsalis pedis pulses are 2+ on the right side, and 2+ on the left side.   Pulmonary/Chest:  Effort normal and breath sounds normal. No stridor. No respiratory distress. She has no wheezes. She has no rales.   Abdominal: Soft. Bowel sounds are normal. She exhibits no distension. There is no tenderness.   Musculoskeletal: She exhibits no edema.   Lymphadenopathy:     She has no cervical adenopathy.   Neurological: She is alert and oriented to person, place, and time. No cranial nerve deficit. Coordination normal.   Skin: Skin is warm and dry. She is not diaphoretic.   Psychiatric: She has a normal mood and affect. Her behavior is normal.   Vitals reviewed.      Fluids    Intake/Output Summary (Last 24 hours) at 04/23/19 2027  Last data filed at 04/23/19 1600   Gross per 24 hour   Intake             2861 ml   Output                0 ml   Net             2861 ml       Laboratory  Recent Labs      04/22/19 0225 04/23/19 0412   WBC  8.5  5.8   RBC  4.11*  3.27*   HEMOGLOBIN  13.1  10.7*   HEMATOCRIT  40.3  33.1*   MCV  98.1*  101.2*   MCH  31.9  32.7   MCHC  32.5*  32.3*   RDW  56.3*  57.4*   PLATELETCT  402  308   MPV  10.0  10.1     Recent Labs      04/21/19   0253  04/22/19 0225  04/23/19   0412   SODIUM  139  140  136   POTASSIUM  4.1  3.7  3.7   CHLORIDE  110  107  109   CO2  22  20  24   GLUCOSE  106*  104*  74   BUN  15  13  11   CREATININE  0.59  0.89  0.63   CALCIUM  8.7  9.3  8.7             Recent Labs      04/22/19 0225   TRIGLYCERIDE  394*       Imaging  DX-CHEST-PORTABLE (1 VIEW)   Final Result      Decreased bibasilar atelectasis      CT-HEAD W/O   Final Result      1. Cerebral atrophy.   2. White matter lucencies most consistent with small vessel ischemic change versus demyelination or gliosis.   3. Otherwise, Head CT without contrast with no acute findings. No evidence of acute cerebral infarction, hemorrhage or mass lesion.      DX-CHEST-PORTABLE (1 VIEW)   Final Result      1.  No visible pneumothorax following RIGHT neck catheter placement   2.  Bibasilar underinflation atelectasis  which could obscure an additional process. This has increased.      DX-CHEST-PORTABLE (1 VIEW)   Final Result      Line and tube position as described above           Assessment/Plan  * Acute respiratory failure (HCC)   Assessment & Plan    Initial hypoxia and hypercarbia due to etoh/drug intoxication  Extubated  Continue RT protocol, encourage deep inspiratory efforts  Supplemental oxygen  Smoking cessation encouraged 4/23  Pulmonary consulting.     Alcohol intoxication with delirium and withdrawal (HCC)- (present on admission)   Assessment & Plan    Wean sedation as tolerates  Cessation of alcohol discussed 4/23  Patient states she has resources to help with cessatoin  Thiamine, folate, MVI     History of suicide attempt- (present on admission)   Assessment & Plan    Sitter at bedside     Alcoholism (HCC)   Assessment & Plan    Ongoing.     Overweight (BMI 25.0-29.9)   Assessment & Plan    Body mass index is 28.21 kg/m².  Needs weight loss and lifestyle modifications.     Acute encephalopathy   Assessment & Plan    Resolved  A+Ox3     Overdose- (present on admission)   Assessment & Plan    Monitor for suicidal ideations  Psychiatry consult          VTE prophylaxis: SCDs

## 2019-04-23 NOTE — PROGRESS NOTES
Received report from TETO Miller. Safety sitter at bedside. IVF verified. Norepi drip stopped. Pt irritable at this time. Safety check list completed.

## 2019-04-23 NOTE — PROGRESS NOTES
Report given to TETO Armstrong. Patient and room searched for personal belongings. Patient educated about reason for search and where her belongings and medications are. She became irritable and hostile but was compliant.

## 2019-04-23 NOTE — CARE PLAN
Problem: Venous Thromboembolism (VTW)/Deep Vein Thrombosis (DVT) Prevention:  Goal: Patient will participate in Venous Thrombosis (VTE)/Deep Vein Thrombosis (DVT)Prevention Measures  Outcome: PROGRESSING AS EXPECTED  Refusing SCD    Problem: Fluid Volume:  Goal: Will maintain balanced intake and output  Outcome: PROGRESSING AS EXPECTED  Velásquez d/c'd at 1700. Void x1. IVF at 83. Pt taking in PO. Weight steady.

## 2019-04-23 NOTE — DISCHARGE PLANNING
Care Transition Team Discharge Planning     Anticipated Discharge Disposition: TBD     Action: This RN CM was able to retrieve Dr. Kay's signature on the legal hold documentation and faxed forms to [facility].     Barriers to Discharge: Awaiting Psych Consult.     Plan: Follow up with treatment team.

## 2019-04-23 NOTE — PROGRESS NOTES
"Received report from TETO Armstrong at 0700. Assumed care of patient. Patient and room searched for personal belongings by this RN and Nathaniel. Education provided to patient on purpose of search, she verbalizes understanding and is compliant. No personal belongings found in room or on patient. Safety checklist completed. Jasmin Mathew, sitting at bedside in direct observation of patient.    Plan of care discussed with patient, all questions and concerns addressed. Patient medicated with Tylenol (her request) for generalized soreness. Patient states that she \"doesn't know\" if her vision is still blurry. All other neuro is intact. Patient helped up to the bathroom and then to the chair for breakfast. Safety measures in place; treaded slipper socks in place, bed in low and locked position, education provided to let sitter know if she needs any assistance and sitter will be able to get a hold of RN; she verbalizes understanding.   "

## 2019-04-23 NOTE — PROGRESS NOTES
"Updated Dr. Arroyo regarding pt statement of vision changes and headache. Pt reports \"blurry\" vision but was able to identify appropriate number of fingers in cardinal gazes as well as track. MD placed order for Ibuprofen for headache. Will medicate per MAR.   "

## 2019-04-23 NOTE — PROGRESS NOTES
Critical Care Progress Note    Date of admission  4/22/2019    Chief Complaint  58 y.o. female admitted 4/22/2019 with benzo o/d and hypoxic resp failure    Hospital Course    58 y.o. female who presented 4/22/2019 with alcohol abuse, chronic pain, depression, PTSD, gerd, CHF. That was found in the middle of road by EMS and brought to [facility]. She expressed she took 10 benzo's tablets and suicidal ideation. She was found with saturation of 77% in ER was placed on NRB but then became more somnolent so was intubated for airway protection. When I meet patient she is restless on high dose on propofol and fentanyl gtts. She has no signs of trauma. Her vitals or stable when backing down on propofol. Labs were unremarkable, other then CO2 22, AG 13, mg 1.6, EtOH 0.31 tylenol and aspirin negative. Ct head was unremarkable and as well was CXR. Ekg was SNR at 68 with no acute findings. Bedside Echo with good BiV function.      Interval Problem Update  Reviewed last 24 hour events:  Tm 100.4  +2.6L over last 24hr, +3L since admit  cxr reviewed  K 3.7  Mg 1.6    BAL 4/22 in process    precedex @ pause  Levophed @ pause    90% RA  Last bm 4/22    Review of Systems  Review of Systems   Constitutional: Negative for chills and fever.   HENT: Negative for congestion.    Eyes: Negative for photophobia.   Respiratory: Negative for cough, sputum production and shortness of breath.    Cardiovascular: Negative for chest pain and palpitations.   Gastrointestinal: Positive for nausea. Negative for abdominal pain and vomiting.   Genitourinary: Negative for dysuria.   Neurological: Negative for focal weakness.   Psychiatric/Behavioral: Negative for depression.   All other systems reviewed and are negative.       Vital Signs for last 24 hours   Temp:  [35.9 °C (96.6 °F)-37.8 °C (100 °F)] 35.9 °C (96.6 °F)  Pulse:  [68-89] 76  Resp:  [13-23] 13  SpO2:  [89 %-100 %] 95 %    Hemodynamic parameters for last 24 hours       Respiratory Information  for the last 24 hours  Nava Vent Mode: Spont  Rate (breaths/min): 15  Vt Target (mL): 400  PEEP/CPAP: 8  FiO2: 40  P Support: 5  P MEAN: 9  Control VTE (exp VT): 230    Physical Exam   Physical Exam   Constitutional: She appears well-developed and well-nourished.   HENT:   Head: Normocephalic and atraumatic.   Eyes: Pupils are equal, round, and reactive to light. Conjunctivae are normal.   Neck: No tracheal deviation present.   Cardiovascular: Normal rate and intact distal pulses.    Pulmonary/Chest: She has no wheezes. She has no rales.   Diminished   Abdominal: Soft. She exhibits no distension. There is no tenderness.   Musculoskeletal: She exhibits no edema.   Neurological: No cranial nerve deficit.   Skin: Skin is warm and dry.   Psychiatric:   Flat affect   Nursing note and vitals reviewed.      Medications  Current Facility-Administered Medications   Medication Dose Route Frequency Provider Last Rate Last Dose   • ibuprofen (MOTRIN) tablet 400 mg  400 mg Oral Q6HRS PRN Ervin Arroyo Jr., D.O.   400 mg at 04/23/19 0406   • magnesium sulfate IVPB premix 2 g  2 g Intravenous Once Joseph Cummings, D.O.       • Respiratory Care per Protocol   Nebulization Continuous RT Fili Martin M.D.       • famotidine (PEPCID) tablet 20 mg  20 mg Enteral Tube Q12HRS Fili Martin M.D.   20 mg at 04/23/19 0539    Or   • famotidine (PEPCID) injection 20 mg  20 mg Intravenous Q12HRS Fili Martin M.D.   20 mg at 04/22/19 1738   • MD Alert...ICU Electrolyte Replacement per Pharmacy   Other PHARMACY TO DOSE Fili Martin M.D.       • enoxaparin (LOVENOX) inj 40 mg  40 mg Subcutaneous DAILY Fili Martin M.D.   40 mg at 04/23/19 0538   • thiamine (B-1) 250 mg in D5W 100 mL IVPB  250 mg Intravenous Q8HRS Fili Martin M.D. 200 mL/hr at 04/23/19 0538 250 mg at 04/23/19 0538   • magnesium oxide (MAG-OX) tablet 400 mg  400 mg Enteral Tube BID Fili Martin M.D.   400 mg at 04/23/19 0538   • cloNIDine  (CATAPRES) tablet 0.1 mg  0.1 mg Enteral Tube Q HOUR PRN Fili Martin M.D.       • fentaNYL (SUBLIMAZE) injection  mcg   mcg Intravenous Q HOUR PRN Fili Martin M.D.   100 mcg at 04/22/19 1147   • norepinephrine (LEVOPHED) 8 mg in  mL Infusion  0-30 mcg/min Intravenous Continuous Fili Martin M.D.   Stopped at 04/22/19 1933   • NS infusion   Intravenous Continuous Bronson Mattson M.D.   Stopped at 04/23/19 0300   • acetaminophen (TYLENOL) tablet 650 mg  650 mg Enteral Tube Q6HRS PRN Bronson Mattson M.D.   650 mg at 04/22/19 2313   • ondansetron (ZOFRAN) syringe/vial injection 4 mg  4 mg Intravenous Q4HRS PRN Bronson Mattson M.D.   4 mg at 04/22/19 2121   • ondansetron (ZOFRAN ODT) dispertab 4 mg  4 mg Enteral Tube Q4HRS PRN Bronson Mattson M.D.       • promethazine (PHENERGAN) tablet 12.5-25 mg  12.5-25 mg Enteral Tube Q4HRS PRN Bronson Mattson M.D.       • promethazine (PHENERGAN) suppository 12.5-25 mg  12.5-25 mg Rectal Q4HRS PRN Bronson Mattson M.D.       • prochlorperazine (COMPAZINE) injection 5-10 mg  5-10 mg Intravenous Q4HRS PRN Bronson Mattson M.D.       • dexmedetomidine (PRECEDEX) 400 mcg/100mL NS premix infusion  0.1-1.5 mcg/kg/hr Intravenous Continuous Fili Kay M.D.   Stopped at 04/22/19 1434   • senna-docusate (PERICOLACE or SENOKOT S) 8.6-50 MG per tablet 2 Tab  2 Tab Oral BID Fili Martin M.D.        And   • polyethylene glycol/lytes (MIRALAX) PACKET 1 Packet  1 Packet Oral QDAY PRN Fili B. Mario, M.D.        And   • magnesium hydroxide (MILK OF MAGNESIA) suspension 30 mL  30 mL Oral QDAY KHADIJAH Martin M.D.        And   • bisacodyl (DULCOLAX) suppository 10 mg  10 mg Rectal QDAY KHADIJAH Martin M.D.           Fluids    Intake/Output Summary (Last 24 hours) at 04/23/19 0741  Last data filed at 04/23/19 0400   Gross per 24 hour   Intake          3817.87 ml   Output              850 ml   Net          2967.87 ml       Laboratory  Recent Labs       04/22/19   0244   ISTATAPH  7.223*   ISTATAPCO2  46.6*   ISTATAPO2  403*   ISTATATCO2  21   [id number]  100*   ISTATARTHCO3  19.2   ISTATARTBE  -8*   ISTATTEMP  35.9 C   ISTATFIO2  100   ISTATSPEC  Arterial   ISTATAPHTC  7.238*   [id number]  397*         Recent Labs      04/21/19 0253 04/22/19 0225 04/22/19 0715  04/23/19 0412   SODIUM  139  140   --   136   POTASSIUM  4.1  3.7   --   3.7   CHLORIDE  110  107   --   109   CO2  22  20   --   24   BUN  15  13   --   11   CREATININE  0.59  0.89   --   0.63   MAGNESIUM  1.6   --   2.4  1.6   PHOSPHORUS  4.3   --   4.4  4.0   CALCIUM  8.7  9.3   --   8.7     Recent Labs      04/21/19 0253 04/22/19 0225 04/23/19 0412   ALTSGPT   --   46   --    ASTSGOT   --   48*   --    ALKPHOSPHAT   --   124*   --    TBILIRUBIN   --   0.2   --    LIPASE   --   81   --    GLUCOSE  106*  104*  74     Recent Labs      04/22/19 0225 04/23/19 0412   WBC  8.5  5.8   NEUTSPOLYS  47.20  48.50   LYMPHOCYTES  34.20  29.90   MONOCYTES  15.60*  17.50*   EOSINOPHILS  1.40  2.40   BASOPHILS  1.10  1.40   ASTSGOT  48*   --    ALTSGPT  46   --    ALKPHOSPHAT  124*   --    TBILIRUBIN  0.2   --      Recent Labs      04/22/19 0225 04/23/19 0412   RBC  4.11*  3.27*   HEMOGLOBIN  13.1  10.7*   HEMATOCRIT  40.3  33.1*   PLATELETCT  402  308       Imaging  X-Ray:  I have personally reviewed the images and compared with prior images.    Assessment/Plan  * Acute respiratory failure (HCC)   Assessment & Plan    Intubated date: 4/22-22  RT/O2 protocols  Follow BAL 4/22     Alcohol intoxication with delirium and withdrawal (HCC)- (present on admission)   Assessment & Plan    Continue MVI/folate/thiamine  Encourage cessation clinically appropriate  Continue telemetry monitoring  Aspiration and seizure precautions  Aggressive electrolyte replacement  Continue Precedex with active titration     History of suicide attempt- (present on admission)   Assessment & Plan    Hx of in past  and now again.   legal 2000 and psychiatric evaluation     Overweight (BMI 25.0-29.9)   Assessment & Plan    Recommend weight and diet modification     Acute encephalopathy   Assessment & Plan    Likely related to Benzo's and alcohol intoxication  Ct head negative  Appears near baseline     Overdose- (present on admission)   Assessment & Plan    Patient with overdose of Benzo's   Resolved          VTE:  Lovenox  Ulcer: PPI  Lines: Central Line  Ongoing indication addressed    I have performed a physical exam and reviewed and updated ROS and Plan today (4/23/2019). In review of yesterday's note (4/22/2019), there are no changes except as documented above.     Discussed patient condition and risk of morbidity and/or mortality with Hospitalist, RN, RT, Therapies and Pharmacy  The patient remains critically ill.  Critical care time = 40 minutes in directly providing and coordinating critical care and extensive data review.  No time overlap and excludes procedures.

## 2019-04-24 ENCOUNTER — PATIENT OUTREACH (OUTPATIENT)
Dept: HEALTH INFORMATION MANAGEMENT | Facility: OTHER | Age: 59
End: 2019-04-24

## 2019-04-24 VITALS
TEMPERATURE: 97 F | WEIGHT: 189.38 LBS | HEART RATE: 74 BPM | OXYGEN SATURATION: 97 % | RESPIRATION RATE: 18 BRPM | BODY MASS INDEX: 29.72 KG/M2 | HEIGHT: 67 IN

## 2019-04-24 PROBLEM — G93.40 ACUTE ENCEPHALOPATHY: Status: RESOLVED | Noted: 2019-04-22 | Resolved: 2019-04-24

## 2019-04-24 PROBLEM — J96.00 ACUTE RESPIRATORY FAILURE (HCC): Status: RESOLVED | Noted: 2019-04-22 | Resolved: 2019-04-24

## 2019-04-24 LAB
ANION GAP SERPL CALC-SCNC: 8 MMOL/L (ref 0–11.9)
BASOPHILS # BLD AUTO: 1.2 % (ref 0–1.8)
BASOPHILS # BLD: 0.05 K/UL (ref 0–0.12)
BUN SERPL-MCNC: 9 MG/DL (ref 8–22)
CALCIUM SERPL-MCNC: 8.6 MG/DL (ref 8.5–10.5)
CHLORIDE SERPL-SCNC: 109 MMOL/L (ref 96–112)
CO2 SERPL-SCNC: 24 MMOL/L (ref 20–33)
CREAT SERPL-MCNC: 0.58 MG/DL (ref 0.5–1.4)
EOSINOPHIL # BLD AUTO: 0.11 K/UL (ref 0–0.51)
EOSINOPHIL NFR BLD: 2.6 % (ref 0–6.9)
ERYTHROCYTE [DISTWIDTH] IN BLOOD BY AUTOMATED COUNT: 56.3 FL (ref 35.9–50)
GLUCOSE SERPL-MCNC: 83 MG/DL (ref 65–99)
HCT VFR BLD AUTO: 34.1 % (ref 37–47)
HGB BLD-MCNC: 10.9 G/DL (ref 12–16)
IMM GRANULOCYTES # BLD AUTO: 0.01 K/UL (ref 0–0.11)
IMM GRANULOCYTES NFR BLD AUTO: 0.2 % (ref 0–0.9)
LYMPHOCYTES # BLD AUTO: 1.32 K/UL (ref 1–4.8)
LYMPHOCYTES NFR BLD: 30.6 % (ref 22–41)
MAGNESIUM SERPL-MCNC: 1.7 MG/DL (ref 1.5–2.5)
MCH RBC QN AUTO: 31.8 PG (ref 27–33)
MCHC RBC AUTO-ENTMCNC: 32 G/DL (ref 33.6–35)
MCV RBC AUTO: 99.4 FL (ref 81.4–97.8)
MONOCYTES # BLD AUTO: 0.64 K/UL (ref 0–0.85)
MONOCYTES NFR BLD AUTO: 14.8 % (ref 0–13.4)
NEUTROPHILS # BLD AUTO: 2.18 K/UL (ref 2–7.15)
NEUTROPHILS NFR BLD: 50.6 % (ref 44–72)
NRBC # BLD AUTO: 0 K/UL
NRBC BLD-RTO: 0 /100 WBC
PHOSPHATE SERPL-MCNC: 3.6 MG/DL (ref 2.5–4.5)
PLATELET # BLD AUTO: 387 K/UL (ref 164–446)
PMV BLD AUTO: 10.3 FL (ref 9–12.9)
POTASSIUM SERPL-SCNC: 3.7 MMOL/L (ref 3.6–5.5)
RBC # BLD AUTO: 3.43 M/UL (ref 4.2–5.4)
SODIUM SERPL-SCNC: 141 MMOL/L (ref 135–145)
WBC # BLD AUTO: 4.3 K/UL (ref 4.8–10.8)

## 2019-04-24 PROCEDURE — 700102 HCHG RX REV CODE 250 W/ 637 OVERRIDE(OP): Performed by: HOSPITALIST

## 2019-04-24 PROCEDURE — 85025 COMPLETE CBC W/AUTO DIFF WBC: CPT

## 2019-04-24 PROCEDURE — 99239 HOSP IP/OBS DSCHRG MGMT >30: CPT | Performed by: HOSPITALIST

## 2019-04-24 PROCEDURE — 83735 ASSAY OF MAGNESIUM: CPT

## 2019-04-24 PROCEDURE — 700102 HCHG RX REV CODE 250 W/ 637 OVERRIDE(OP): Performed by: INTERNAL MEDICINE

## 2019-04-24 PROCEDURE — 700111 HCHG RX REV CODE 636 W/ 250 OVERRIDE (IP): Performed by: INTERNAL MEDICINE

## 2019-04-24 PROCEDURE — 700111 HCHG RX REV CODE 636 W/ 250 OVERRIDE (IP): Performed by: HOSPITALIST

## 2019-04-24 PROCEDURE — 90792 PSYCH DIAG EVAL W/MED SRVCS: CPT | Performed by: PSYCHIATRY & NEUROLOGY

## 2019-04-24 PROCEDURE — A9270 NON-COVERED ITEM OR SERVICE: HCPCS | Performed by: INTERNAL MEDICINE

## 2019-04-24 PROCEDURE — 84100 ASSAY OF PHOSPHORUS: CPT

## 2019-04-24 PROCEDURE — 80048 BASIC METABOLIC PNL TOTAL CA: CPT

## 2019-04-24 PROCEDURE — A9270 NON-COVERED ITEM OR SERVICE: HCPCS | Performed by: HOSPITALIST

## 2019-04-24 RX ORDER — POTASSIUM CHLORIDE 20 MEQ/1
40 TABLET, EXTENDED RELEASE ORAL ONCE
Status: COMPLETED | OUTPATIENT
Start: 2019-04-24 | End: 2019-04-24

## 2019-04-24 RX ORDER — LISINOPRIL 10 MG/1
10 TABLET ORAL DAILY
Qty: 60 TAB | Refills: 1 | Status: SHIPPED | OUTPATIENT
Start: 2019-04-24 | End: 2019-08-05

## 2019-04-24 RX ORDER — OMEPRAZOLE 20 MG/1
20 CAPSULE, DELAYED RELEASE ORAL DAILY
Qty: 30 CAP | Refills: 3 | Status: SHIPPED | OUTPATIENT
Start: 2019-04-24 | End: 2019-08-05

## 2019-04-24 RX ORDER — MAGNESIUM SULFATE HEPTAHYDRATE 40 MG/ML
2 INJECTION, SOLUTION INTRAVENOUS ONCE
Status: COMPLETED | OUTPATIENT
Start: 2019-04-24 | End: 2019-04-24

## 2019-04-24 RX ADMIN — OMEPRAZOLE 20 MG: 20 CAPSULE, DELAYED RELEASE ORAL at 05:25

## 2019-04-24 RX ADMIN — IBUPROFEN 400 MG: 400 TABLET, FILM COATED ORAL at 10:27

## 2019-04-24 RX ADMIN — Medication 100 MG: at 06:00

## 2019-04-24 RX ADMIN — MAGNESIUM SULFATE IN WATER 2 G: 40 INJECTION, SOLUTION INTRAVENOUS at 10:27

## 2019-04-24 RX ADMIN — POTASSIUM CHLORIDE 40 MEQ: 1500 TABLET, EXTENDED RELEASE ORAL at 10:27

## 2019-04-24 RX ADMIN — ACETAMINOPHEN 650 MG: 325 TABLET, FILM COATED ORAL at 05:39

## 2019-04-24 RX ADMIN — ALPRAZOLAM 0.25 MG: 0.25 TABLET ORAL at 12:29

## 2019-04-24 RX ADMIN — MAGNESIUM OXIDE TAB 400 MG (241.3 MG ELEMENTAL MG) 400 MG: 400 (241.3 MG) TAB at 05:25

## 2019-04-24 RX ADMIN — ALPRAZOLAM 0.25 MG: 0.25 TABLET ORAL at 05:39

## 2019-04-24 RX ADMIN — ENOXAPARIN SODIUM 40 MG: 100 INJECTION SUBCUTANEOUS at 05:25

## 2019-04-24 ASSESSMENT — ENCOUNTER SYMPTOMS
PALPITATIONS: 0
BACK PAIN: 0
FEVER: 0
SORE THROAT: 0
SHORTNESS OF BREATH: 0
MYALGIAS: 1
DIZZINESS: 0
HEADACHES: 0
NAUSEA: 0
DIARRHEA: 0
ABDOMINAL PAIN: 0
STRIDOR: 0

## 2019-04-24 ASSESSMENT — LIFESTYLE VARIABLES: SUBSTANCE_ABUSE: 1

## 2019-04-24 NOTE — DISCHARGE PLANNING
Care Transition Team Discharge Planning     Anticipated Discharge Disposition: Psych Facility     Action: This RN CM provided Dr. Cummings with medical clearance paperwork that needs to be filled out prior to referral send-out.      Barriers to Discharge: Awaiting Medical clearance paperwork.     Plan: Follow up with Dr. Cummings and Psychiatry.
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
## 2019-04-24 NOTE — DISCHARGE PLANNING
Plan or Request: Behavioral Health Referral and Legal hold paperwork faxed to [facility], [facility], [facility], [place], and [facility] at 1130.

## 2019-04-24 NOTE — DISCHARGE INSTRUCTIONS
Discharge Instructions    Discharged to other by taxi with self. Discharged via wheelchair, hospital escort: Refused.  Special equipment needed: Not Applicable    Be sure to schedule a follow-up appointment with your primary care doctor or any specialists as instructed.     Discharge Plan:   Diet Plan: Discussed  Activity Level: Discussed  Confirmed Follow up Appointment: Patient to Call and Schedule Appointment  Confirmed Symptoms Management: Discussed  Medication Reconciliation Updated: Yes  Pneumococcal Vaccine Administered/Refused: Given (See MAR)  Influenza Vaccine Indication: Indicated: 9 to 64 years of age  Influenza Vaccine Given - only chart on this line when given: Influenza Vaccine Given (See MAR)    I understand that a diet low in cholesterol, fat, and sodium is recommended for good health. Unless I have been given specific instructions below for another diet, I accept this instruction as my diet prescription.   Other diet: Regular    Special Instructions: None    · Is patient discharged on Warfarin / Coumadin?   No         Alcohol Use Disorder  Alcohol use disorder is when your drinking disrupts your daily life. When you have this condition, you drink too much alcohol and you cannot control your drinking.  Alcohol use disorder can cause serious problems with your physical health. It can affect your brain, heart, liver, pancreas, immune system, stomach, and intestines. Alcohol use disorder can increase your risk for certain cancers and cause problems with your mental health, such as depression, anxiety, psychosis, delirium, and dementia. People with this disorder risk hurting themselves and others.  What are the causes?  This condition is caused by drinking too much alcohol over time. It is not caused by drinking too much alcohol only one or two times. Some people with this condition drink alcohol to cope with or escape from negative life events. Others drink to relieve pain or symptoms of mental  illness.  What increases the risk?  You are more likely to develop this condition if:  · You have a family history of alcohol use disorder.  · Your culture encourages drinking to the point of intoxication, or makes alcohol easy to get.  · You had a mood or conduct disorder in childhood.  · You have been a victim of abuse.  · You are an adolescent and:  ¨ You have poor grades or difficulties in school.  ¨ Your caregivers do not talk to you about saying no to alcohol, or supervise your activities.  ¨ You are impulsive or you have trouble with self-control.  What are the signs or symptoms?  Symptoms of this condition include:  · Drinking more than you want to.  · Drinking for longer than you want to.  · Trying several times to drink less or to control your drinking.  · Spending a lot of time getting alcohol, drinking, or recovering from drinking.  · Craving alcohol.  · Having problems at work, at school, or at home due to drinking.  · Having problems in relationships due to drinking.  · Drinking when it is dangerous to drink, such as before driving a car.  · Continuing to drink even though you know you might have a physical or mental problem related to drinking.  · Needing more and more alcohol to get the same effect you want from the alcohol (building up tolerance).  · Having symptoms of withdrawal when you stop drinking. Symptoms of withdrawal include:  ¨ Fatigue.  ¨ Nightmares.  ¨ Trouble sleeping.  ¨ Depression.  ¨ Anxiety.  ¨ Fever.  ¨ Seizures.  ¨ Severe confusion.  ¨ Feeling or seeing things that are not there (hallucinations).  ¨ Tremors.  ¨ Rapid heart rate.  ¨ Rapid breathing.  ¨ High blood pressure.  · Drinking to avoid symptoms of withdrawal.  How is this diagnosed?  This condition is diagnosed with an assessment. Your health care provider may start the assessment by asking three or four questions about your drinking.  Your health care provider may perform a physical exam or do lab tests to see if you have  physical problems resulting from alcohol use. She or he may refer you to a mental health professional for evaluation.  How is this treated?  Some people with alcohol use disorder are able to reduce their alcohol use to low-risk levels. Others need to completely quit drinking alcohol. When necessary, mental health professionals with specialized training in substance use treatment can help. Your health care provider can help you decide how severe your alcohol use disorder is and what type of treatment you need. The following forms of treatment are available:  · Detoxification. Detoxification involves quitting drinking and using prescription medicines within the first week to help lessen withdrawal symptoms. This treatment is important for people who have had withdrawal symptoms before and for heavy drinkers who are likely to have withdrawal symptoms. Alcohol withdrawal can be dangerous, and in severe cases, it can cause death. Detoxification may be provided in a home, community, or primary care setting, or in a hospital or substance use treatment facility.  · Counseling. This treatment is also called talk therapy. It is provided by substance use treatment counselors. A counselor can address the reasons you use alcohol and suggest ways to keep you from drinking again or to prevent problem drinking. The goals of talk therapy are to:  ¨ Find healthy activities and ways for you to cope with stress.  ¨ Identify and avoid the things that trigger your alcohol use.  ¨ Help you learn how to handle cravings.  · Medicines. Medicines can help treat alcohol use disorder by:  ¨ Decreasing alcohol cravings.  ¨ Decreasing the positive feeling you have when you drink alcohol.  ¨ Causing an uncomfortable physical reaction when you drink alcohol (aversion therapy).  · Support groups. Support groups are led by people who have quit drinking. They provide emotional support, advice, and guidance.  These forms of treatment are often  combined. Some people with this condition benefit from a combination of treatments provided by specialized substance use treatment centers.  Follow these instructions at home:  · Take over-the-counter and prescription medicines only as told by your health care provider.  · Check with your health care provider before starting any new medicines.  · Ask friends and family members not to offer you alcohol.  · Avoid situations where alcohol is served, including gatherings where others are drinking alcohol.  · Create a plan for what to do when you are tempted to use alcohol.  · Find hobbies or activities that you enjoy that do not include alcohol.  · Keep all follow-up visits as told by your health care provider. This is important.  How is this prevented?  · If you drink, limit alcohol intake to no more than 1 drink a day for nonpregnant women and 2 drinks a day for men. One drink equals 12 oz of beer, 5 oz of wine, or 1½ oz of hard liquor.  · If you have a mental health condition, get treatment and support.  · Do not give alcohol to adolescents.  · If you are an adolescent:  ¨ Do not drink alcohol.  ¨ Do not be afraid to say no if someone offers you alcohol. Speak up about why you do not want to drink. You can be a positive role model for your friends and set a good example for those around you by not drinking alcohol.  ¨ If your friends drink, spend time with others who do not drink alcohol. Make new friends who do not use alcohol.  ¨ Find healthy ways to manage stress and emotions, such as meditation or deep breathing, exercise, spending time in nature, listening to music, or talking with a trusted friend or family member.  Contact a health care provider if:  · You are not able to take your medicines as told.  · Your symptoms get worse.  · You return to drinking alcohol (relapse) and your symptoms get worse.  Get help right away if:  · You have thoughts about hurting yourself or others.  If you ever feel like you may  hurt yourself or others, or have thoughts about taking your own life, get help right away. You can go to your nearest emergency department or call:  · Your local emergency services (911 in the [place]  · A suicide crisis helpline, such as the National Suicide Prevention Lifeline at 1-115.187.1002. This is open 24 hours a day.  Summary  · Alcohol use disorder is when your drinking disrupts your daily life. When you have this condition, you drink too much alcohol and you cannot control your drinking.  · Treatment may include detoxification, counseling, medicine, and support groups.  · Ask friends and family members not to offer you alcohol. Avoid situations where alcohol is served.  · Get help right away if you have thoughts about hurting yourself or others.  This information is not intended to replace advice given to you by your health care provider. Make sure you discuss any questions you have with your health care provider.  Document Released: 01/25/2006 Document Revised: 09/14/2017 Document Reviewed: 09/14/2017  [employer] Interactive Patient Education © 2017 [employer]      Depression / Suicide Risk    As you are discharged from this [facility] facility, it is important to learn how to keep safe from harming yourself.    Recognize the warning signs:  · Abrupt changes in personality, positive or negative- including increase in energy   · Giving away possessions  · Change in eating patterns- significant weight changes-  positive or negative  · Change in sleeping patterns- unable to sleep or sleeping all the time   · Unwillingness or inability to communicate  · Depression  · Unusual sadness, discouragement and loneliness  · Talk of wanting to die  · Neglect of personal appearance   · Rebelliousness- reckless behavior  · Withdrawal from people/activities they love  · Confusion- inability to concentrate     If you or a loved one observes any of these behaviors or has concerns about self-harm, here's what you can  do:  · Talk about it- your feelings and reasons for harming yourself  · Remove any means that you might use to hurt yourself (examples: pills, rope, extension cords, firearm)  · Get professional help from the community (Mental Health, Substance Abuse, psychological counseling)  · Do not be alone:Call your Safe Contact- someone whom you trust who will be there for you.  · Call your local CRISIS HOTLINE 225-7536 or 010-729-1481  · Call your local Children's Mobile Crisis Response Team [place] (517) 588-9569 or www.[employer]  · Call the toll free National Suicide Prevention Hotlines   · National Suicide Prevention Lifeline 305-676-[place] (6689)  · National Hope Line Network 800-SUICIDE (649-6708)

## 2019-04-24 NOTE — PROGRESS NOTES
Social work at bedside. Pt provided with belongings, purse, and bus pass. Pt [facility]

## 2019-04-24 NOTE — DISCHARGE SUMMARY
Discharge Summary    CHIEF COMPLAINT ON ADMISSION  Chief Complaint   Patient presents with   • Alcohol Intoxication   • Suicide Attempt       Reason for Admission  EMS     Admission Date  4/22/2019    CODE STATUS  Full Code    HPI & HOSPITAL COURSE    58 y.o. female w/ hx of EtOH admitted 4/22/2019 with being found down and required initial intubation.  Now extubated.  Patient admits to heavy alcohol drinking and recent depression.  Currently denies any suicidal ideation psychiatry was consulted and lifted legal hold.  Patient and I had discussed cessation of alcohol and she states she had previously been to Alcoholics Anonymous which she will follow up with.       Therefore, she is discharged in good and stable condition to home with close outpatient follow-up.    The patient met 2-midnight criteria for an inpatient stay at the time of discharge.    Discharge Date  4/24/2019    FOLLOW UP ITEMS POST DISCHARGE  None    DISCHARGE DIAGNOSES  Principal Problem (Resolved):    Acute respiratory failure (HCC) POA: Unknown  Active Problems:    History of suicide attempt POA: Yes    Overweight (BMI 25.0-29.9) POA: Unknown    Alcoholism (HCC) POA: Unknown  Resolved Problems:    Alcohol intoxication with delirium and withdrawal (HCC) POA: Yes    Overdose POA: Yes    Acute encephalopathy POA: Unknown      FOLLOW UP  No future appointments.  [facility]  [facility]  [place] [address]  [phone]    Schedule an appointment as soon as possible for a visit in 2 weeks        MEDICATIONS ON DISCHARGE     Medication List      CONTINUE taking these medications      Instructions   lisinopril 10 MG Tabs  Commonly known as:  PRINIVIL   Take 1 Tab by mouth every day.  Dose:  10 mg     omeprazole 20 MG delayed-release capsule  Commonly known as:  PRILOSEC   Take 1 Cap by mouth every day.  Dose:  20 mg     PROZAC 40 MG capsule  Generic drug:  fluoxetine   Take 40 mg by mouth every day.  Dose:  40 mg        STOP taking these  medications    clonazePAM 0.5 MG Tabs  Commonly known as:  KLONOPIN     spironolactone 25 MG Tabs  Commonly known as:  ALDACTONE            Allergies  Allergies   Allergen Reactions   • Sulfa Drugs Vomiting   • Toradol Vomiting   • Gabapentin      Bowel incontinence     • Amoxicillin Rash     Reported by NAIDA on arrival to ED    Pt states she takes PCN 10/3       DIET  Orders Placed This Encounter   Procedures   • Diet Order Regular (No Sharps)     Paperware only on meal tray.     Standing Status:   Standing     Number of Occurrences:   1     Order Specific Question:   Diet:     Answer:   Regular [1]     Comments:   No Sharps       ACTIVITY  As tolerated.  Weight bearing as tolerated    CONSULTATIONS  PUlmonary  Psychiatry    PROCEDURES  None    LABORATORY  Lab Results   Component Value Date    SODIUM 141 04/24/2019    POTASSIUM 3.7 04/24/2019    CHLORIDE 109 04/24/2019    CO2 24 04/24/2019    GLUCOSE 83 04/24/2019    BUN 9 04/24/2019    CREATININE 0.58 04/24/2019    CREATININE 0.8 05/01/2009        Lab Results   Component Value Date    WBC 4.3 (L) 04/24/2019    HEMOGLOBIN 10.9 (L) 04/24/2019    HEMATOCRIT 34.1 (L) 04/24/2019    PLATELETCT 387 04/24/2019        Total time of the discharge process exceeds 31 minutes.

## 2019-04-24 NOTE — DISCHARGE PLANNING
Care Transition Team Discharge Planning     Anticipated Discharge Disposition: Inpatient Psych Facility     Action: This RN CM faxed completed legal hold and medical clearance documentation to VICTORIANO Barbour.     Barriers to Discharge: Psych facility acceptance.     Plan: Follow up with [facility] and treatment team.

## 2019-04-24 NOTE — CARE PLAN
Problem: Communication  Goal: The ability to communicate needs accurately and effectively will improve  Outcome: PROGRESSING AS EXPECTED      Problem: Safety  Goal: Will remain free from injury  Outcome: PROGRESSING AS EXPECTED  Pt resting in bed after shower. Sitter at bedside, will continue to monitor.

## 2019-04-24 NOTE — DISCHARGE PLANNING
Care Transition Team Assessment    In the case of an emergency, pt's NOK is Katalina (Daughter) [phone].     This RNCM spoke with pt at bedside and obtained the information used in this assessment. Pt verified accuracy of facesheet. Pt is homeless and lives in the shelter on [address] and occasionally stays with her friends in town. Pt uses the [facility] pharmacy on [address]. Prior to current hospitalization, pt was completely independent with ADLS/IADLS. Pt takes the bus to and from her doctors appointments at [facility]. Pt collects approximately $700/month in social security disability but her Representative Payee, appointed by , has suspended her due to the proximity act and patient is aware that she needs to call or visit the social security office in [place] to be appointed a new one. Pt has a h/o of depression/anxiety and suicidal ideation. Pt also has history of SA with alcohol and benzodiazepines. Pt reports having a good support system from her daughter and her friends. Pt does not have an advanced directive filed at this time and refuses to fill out the packet.. Pt's plan is to be discharged to the [facility] homeless shelter via bus.    This RN CM provided patient with [facility] day pass and social security office resources. Security has been contacted and will bring up an outfit for her to be discharged in and nurse was able to locate her shoes. Patient communicates not needing any money for her prescriptions because medicaid covers all of it.     Information Source  Orientation : Oriented x 4  Information Given By: Patient  Informant's Name: Ashleigh  Who is responsible for making decisions for patient? : Patient    Readmission Evaluation  Is this a readmission?: No    Elopement Risk  Legal Hold: No  Ambulatory or Self Mobile in Wheelchair: Yes  Disoriented: No  Psychiatric Symptoms: None  History of Wandering: No  Elopement this Admit: No  Vocalizing Wanting to Leave: No  Displays  Behaviors, Body Language Wanting to Leave: No-Not at Risk for Elopement  Time of Legal Hold: 0122  Date of Legal Hold: 04/22/19  End Time of Legal Hold - SW to complete: 1258  End Date of Legal Hold - SW to complete: 04/24/19  Elopement Risk: Not at Risk for Elopement  Wanderguard On: Unavailable  Personal Belongings: Hospital Clothing Only  Environmental Precautions: Dietary Notified for Plastic Utensils / Paperware Only    Interdisciplinary Discharge Planning  Primary Care Physician: Willy at [facility]  Support Systems: Children  Housing / Facility: Homeless  Do You Take your Prescribed Medications Regularly: Yes  Able to Return to Previous ADL's: Yes  Mobility Issues: No  Prior Services: None  Patient Expects to be Discharged to:: Homeless Shelter  Assistance Needed: No    Discharge Preparedness  What is your plan after discharge?: Other (comment) (Homeless Shelter)  What are your discharge supports?: Child, Other (comment) (friends)  Prior Functional Level: Ambulatory, Independent with Activities of Daily Living, Independent with Medication Management  Difficulity with ADLs: None  Difficulity with IADLs: None    Functional Assesment  Prior Functional Level: Ambulatory, Independent with Activities of Daily Living, Independent with Medication Management    Finances  Financial Barriers to Discharge: Yes  Average Monthly Income: 700 $  Source of Income: Social Security Disability  Prescription Coverage: Yes              Advance Directive  Advance Directive?: None    Domestic Abuse  Have you ever been the victim of abuse or violence?: Not Sure    Psychological Assessment  History of Substance Abuse: Alcohol, Other (comment) (Benzos)  Date Last Used - Alcohol: 4/22/2019  History of Psychiatric Problems: Yes  Non-compliant with Treatment: No  Newly Diagnosed Illness: No    Discharge Risks or Barriers  Discharge risks or barriers?: Transportation, Substance abuse, Mental health, Post-acute placement /  services, Homeless / couch surfing  Patient risk factors: Homeless, Substance abuse, Other (comment) (transportation)    Anticipated Discharge Information  Anticipated discharge disposition: Shelter  Discharge Address: [address]  Discharge Contact Phone Number: [phone]

## 2019-04-24 NOTE — PROGRESS NOTES
Pt discharged to bus stop in wheelchair. Pt has all belongings and bus pass in hand.    Discharge instructions and paperwork completed. Pt has no questions or concerns at this time.

## 2019-04-24 NOTE — CARE PLAN
Problem: Safety  Goal: Will remain free from falls    Intervention: Implement fall precautions  Bed locked and in low position with call light within reach. Treaded socks on pt. Sitter 1:1 with pt      Problem: Skin Integrity  Goal: Risk for impaired skin integrity will decrease    Intervention: Implement precautions to protect skin integrity in collaboration with the interdisciplinary team  Pt turns and repositions self throughout shift. Pillows provided for support and repositioning. Pt mobilized throughout shift

## 2019-04-24 NOTE — PROGRESS NOTES
[facility] Medicine Daily Progress Note    Date of Service  4/24/2019    Chief Complaint  Altered mental status    Hospital Course    58 y.o. female w/ hx of EtOH admitted 4/22/2019 with being found down and required initial intubation.  Now extubated..        Interval Problem Update  Alert and awake.  Denies any suicidal ideations currently  States she has plans for attending Alcoholics Anonymous again  We discussed need for smoking cessation  Moves all extremities  Aware that she is medically cleared for discharge/placement will be pending on psychiatry's disposition.  Care discussed in a.m. ICU multidisciplinary rounds as well as with   Psychiatric medical clearance paperwork filled out.    Consultants/Specialty  Pulmonary    Code Status  FULL    Disposition  Transfer to medical floor    Review of Systems  Review of Systems   Constitutional: Positive for malaise/fatigue. Negative for fever.   HENT: Negative for sore throat.    Respiratory: Negative for shortness of breath and stridor.    Cardiovascular: Negative for palpitations and leg swelling.   Gastrointestinal: Negative for abdominal pain, diarrhea and nausea.   Genitourinary: Positive for frequency.   Musculoskeletal: Positive for myalgias (decreased, some in her calves). Negative for back pain.   Neurological: Negative for dizziness and headaches.   Psychiatric/Behavioral: Positive for substance abuse.        Physical Exam  Temp:  [36.1 °C (97 °F)-36.7 °C (98 °F)] 36.1 °C (97 °F)  Pulse:  [67-74] 74  Resp:  [18-20] 18  SpO2:  [95 %-97 %] 97 %    Physical Exam   Constitutional: She is oriented to person, place, and time. She appears well-developed and well-nourished. No distress.   Overweight   HENT:   Head: Normocephalic and atraumatic.   Nose: Nose normal.   Mouth/Throat: No oropharyngeal exudate.   Eyes: Conjunctivae and EOM are normal. Right eye exhibits no discharge. Left eye exhibits no discharge.   Neck: No tracheal deviation present.    Cardiovascular: Normal rate, regular rhythm and normal heart sounds.    No murmur heard.  Pulses:       Radial pulses are 2+ on the right side, and 2+ on the left side.        Dorsalis pedis pulses are 2+ on the right side, and 2+ on the left side.   Pulmonary/Chest: Effort normal and breath sounds normal. No respiratory distress. She has no wheezes. She has no rales.   Abdominal: Soft. Bowel sounds are normal. She exhibits no distension. There is no tenderness.   Musculoskeletal: She exhibits no edema.   Neurological: She is alert and oriented to person, place, and time. No cranial nerve deficit. Coordination normal.   Skin: Skin is warm and dry. She is not diaphoretic.   Psychiatric: She has a normal mood and affect. Her behavior is normal. Thought content normal. She expresses no suicidal ideation.   Vitals reviewed.      Fluids    Intake/Output Summary (Last 24 hours) at 04/24/19 1300  Last data filed at 04/24/19 1200   Gross per 24 hour   Intake           638.75 ml   Output                0 ml   Net           638.75 ml       Laboratory  Recent Labs      04/22/19 0225 04/23/19 0412 04/24/19   0544   WBC  8.5  5.8  4.3*   RBC  4.11*  3.27*  3.43*   HEMOGLOBIN  13.1  10.7*  10.9*   HEMATOCRIT  40.3  33.1*  34.1*   MCV  98.1*  101.2*  99.4*   MCH  31.9  32.7  31.8   MCHC  32.5*  32.3*  32.0*   RDW  56.3*  57.4*  56.3*   PLATELETCT  402  308  387   MPV  10.0  10.1  10.3     Recent Labs      04/22/19 0225 04/23/19 0412 04/24/19   0544   SODIUM  140  136  141   POTASSIUM  3.7  3.7  3.7   CHLORIDE  107  109  109   CO2  20  24  24   GLUCOSE  104*  74  83   BUN  13  11  9   CREATININE  0.89  0.63  0.58   CALCIUM  9.3  8.7  8.6             Recent Labs      04/22/19 0225   TRIGLYCERIDE  394*       Imaging  DX-CHEST-PORTABLE (1 VIEW)   Final Result      Decreased bibasilar atelectasis      CT-HEAD W/O   Final Result      1. Cerebral atrophy.   2. White matter lucencies most consistent with small vessel  ischemic change versus demyelination or gliosis.   3. Otherwise, Head CT without contrast with no acute findings. No evidence of acute cerebral infarction, hemorrhage or mass lesion.      DX-CHEST-PORTABLE (1 VIEW)   Final Result      1.  No visible pneumothorax following RIGHT neck catheter placement   2.  Bibasilar underinflation atelectasis which could obscure an additional process. This has increased.      DX-CHEST-PORTABLE (1 VIEW)   Final Result      Line and tube position as described above           Assessment/Plan  * Acute respiratory failure (HCC)   Assessment & Plan    Initial hypoxia and hypercarbia due to etoh/drug intoxication  Extubated  Continue RT protocol, encourage deep inspiratory efforts  Supplemental oxygen  Smoking cessation encouraged 4/23  Pulmonary consulting.     Alcohol intoxication with delirium and withdrawal (HCC)- (present on admission)   Assessment & Plan    Wean sedation as tolerates  Cessation of alcohol discussed 4/23  Patient states she has resources to help with cessatoin  Thiamine, folate, MVI     History of suicide attempt- (present on admission)   Assessment & Plan    Sitter at bedside     Alcoholism (HCC)   Assessment & Plan    Ongoing.     Overweight (BMI 25.0-29.9)   Assessment & Plan    Body mass index is 28.21 kg/m².  Needs weight loss and lifestyle modifications.     Acute encephalopathy   Assessment & Plan    Resolved  A+Ox3     Overdose- (present on admission)   Assessment & Plan    Monitor for suicidal ideations  Psychiatry consult          VTE prophylaxis: SCDs

## 2019-04-25 ENCOUNTER — APPOINTMENT (OUTPATIENT)
Dept: RADIOLOGY | Facility: MEDICAL CENTER | Age: 59
End: 2019-04-25
Attending: EMERGENCY MEDICINE
Payer: MEDICAID

## 2019-04-25 ENCOUNTER — HOSPITAL ENCOUNTER (EMERGENCY)
Facility: MEDICAL CENTER | Age: 59
End: 2019-04-25
Attending: EMERGENCY MEDICINE
Payer: MEDICAID

## 2019-04-25 VITALS
DIASTOLIC BLOOD PRESSURE: 88 MMHG | HEIGHT: 65 IN | TEMPERATURE: 96.7 F | HEART RATE: 87 BPM | RESPIRATION RATE: 14 BRPM | SYSTOLIC BLOOD PRESSURE: 138 MMHG | BODY MASS INDEX: 31.51 KG/M2 | OXYGEN SATURATION: 97 %

## 2019-04-25 DIAGNOSIS — M54.2 NECK PAIN: Primary | ICD-10-CM

## 2019-04-25 PROCEDURE — 99283 EMERGENCY DEPT VISIT LOW MDM: CPT

## 2019-04-25 RX ORDER — ACETAMINOPHEN 325 MG/1
650 TABLET ORAL ONCE
Status: DISCONTINUED | OUTPATIENT
Start: 2019-04-25 | End: 2019-04-25 | Stop reason: HOSPADM

## 2019-04-25 NOTE — ED NOTES
Chief Complaint   Patient presents with   • Neck Pain     Pt was recently admitted to the ICU with a central line, and states there are bubbles forming where the line was placed     There is tenderness at the site where she had a central line removed.

## 2019-04-25 NOTE — ED PROVIDER NOTES
"ED Provider Note    CHIEF COMPLAINT  Chief Complaint   Patient presents with   • Neck Pain     Pt was recently admitted to the ICU with a central line, and states there are bubbles forming where the line was placed       HPI  Ashleigh Marquis is a 58 y.o. female who presents to the emergency department through triage for neck pain.  Patient with long-standing history of alcohol abuse, recently admitted for alcohol intoxication altered mental status, spent some time in the intensive care unit where she states she had an IV, suspect central venous catheter in her right neck.  Patient states discharged yesterday.  Progressive discomfort in the neck since that time \"I can feel bubbles under the skin.\"  \"I feel like it swelling.\"  No chest pain or shortness of breath.  No sore throat, difficulty swallowing.  No change in voice.  No fever.  Denies vomiting.  Patient did go home and drink alcohol again last night.    REVIEW OF SYSTEMS  See HPI for further details. All other systems are negative.  Patient is a poor historian.    PAST MEDICAL HISTORY   has a past medical history of Alcoholism ([facility]); Anxiety; Arthritis; ASTHMA; Cancer ([facility]) (1981); Chronic low back pain; Congestive heart failure ([facility]); Depression; EtOH dependence ([facility]); Fall; GERD (gastroesophageal reflux disease); HTN; Hypertension; Indigestion; Muscle disorder; OSTEOPOROSIS; Other specified symptom associated with female genital organs; Psychiatric disorder; PTSD (post-traumatic stress disorder); Renal disorder; Seizure ([facility]); Ulcer; and Vitamin D deficiency.    SOCIAL HISTORY  Social History     Social History Main Topics   • Smoking status: Current Every Day Smoker     Packs/day: 0.50     Years: 20.00     Types: Cigarettes   • Smokeless tobacco: Never Used      Comment: 1/2 pack per day   • Alcohol use Yes      Comment: vodka, heavy use   • Drug use: No   • Sexual activity: No       SURGICAL HISTORY   has a past surgical history that includes hernia " "repair (1977); cysto stent placemnt pre surg (10/7/2010); cystoscopy stent placement (11/9/2010); ureteroscopy (11/9/2010); lasertripsy (11/9/2010); stent removal (11/9/2010); and gastroscopy-endo (N/A, 10/3/2018).    CURRENT MEDICATIONS  Home Medications     Reviewed by Zachary Morse R.N. (Registered Nurse) on 04/25/19 at 0528  Med List Status: <None>   Medication Last Dose Status   fluoxetine (PROZAC) 40 MG capsule  Active   lisinopril (PRINIVIL) 10 MG Tab  Active   omeprazole (PRILOSEC) 20 MG delayed-release capsule  Active                ALLERGIES  Allergies   Allergen Reactions   • Sulfa Drugs Vomiting   • Toradol Vomiting   • Gabapentin      Bowel incontinence     • Amoxicillin Rash     Reported by NAIDA on arrival to ED    Pt states she takes PCN 10/3       PHYSICAL EXAM  VITAL SIGNS: /88   Pulse 87   Temp 35.9 °C (96.7 °F) (Temporal)   Resp 14   Ht 1.651 m (5' 5\")   LMP 11/02/2015   SpO2 97%   BMI 31.51 kg/m²   Pulse ox interpretation: I interpret this pulse ox as normal.  Constitutional: Alert in no apparent distress.  HENT: Normocephalic, atraumatic. Bilateral external ears normal, Nose normal. Moist mucous membranes.    Eyes: Pupils are equal and reactive, Conjunctiva normal.   Neck: Normal range of motion, Supple.  No stridor or dysphonia.  No anterior neck swelling, induration, cellulitis or crepitus.  There is some superficial patchy raised macular erythema consistent with tape or Tegaderm reaction.  No blistering or vesicles.  No fluctuance.  Lymphatic: No lymphadenopathy noted.   Cardiovascular: Regular rate and rhythm, no murmurs. Distal pulses intact.  No peripheral edema.  Thorax & Lungs: Normal breath sounds.  No wheezing/rales/ronchi. No increased work of breathing  Skin: Warm, Dry  Musculoskeletal: Good range of motion in all major joints.    Neurologic: Alert , no gross focal deficit noted.  Psychiatric: Odd affect.  Restless.  Cooperative.  History of alcohol abuse, clinically " arnulfo.      DIAGNOSTIC STUDIES / PROCEDURES    COURSE & MEDICAL DECISION MAKING  Although I suspect superficial reaction to the tape or Tegaderm overlying what was a central venous catheter, there is no clinical evidence for abscess or cellulitis.  No crepitus.  No anterior neck swelling, stridor or dysphonia.  Patient describes significant discomfort and believes she is more swollen at discharge.  Will proceed with imaging.      Although patient was initially agreeable and thankful for planned intervention she is now refusing blood work and CT.  Will change to x-ray soft tissue neck and chest for gross abnormality or subcu air.    6:25 AM patient eloped from the department without further discussion with the nursing staff or myself.    FINAL IMPRESSION  (M54.2) Neck pain  (primary encounter diagnosis)      Electronically signed by: Ashleigh Allen, 4/25/2019 6:48 AM      This dictation was created using voice recognition software. The accuracy of the dictation is limited to the abilities of the software. I expect there may be some errors of grammar and possibly content. The nursing notes were reviewed and certain aspects of this information were incorporated into this note.

## 2019-04-25 NOTE — ED TRIAGE NOTES
"Ashleigh Marquis  58 y.o. female  Chief Complaint   Patient presents with   • Neck Pain     Pt was recently admitted to the ICU with a central line, and states there are bubbles forming where the line was placed       Pt amb to triage with steady gait for above complaint.   Pt is alert and oriented, speaking in full sentences, follows commands and responds appropriately to questions. NAD. Resp are even and unlabored.  Pt placed in lobby. Pt educated on triage process. Pt encouraged to alert staff for any changes.  /88   Pulse 84   Temp 35.9 °C (96.7 °F) (Temporal)   Resp 14   Ht 1.651 m (5' 5\")   LMP 11/02/2015   SpO2 97%   BMI 31.51 kg/m²     "

## 2019-04-26 ENCOUNTER — HOSPITAL ENCOUNTER (EMERGENCY)
Facility: MEDICAL CENTER | Age: 59
End: 2019-04-26
Payer: MEDICAID

## 2019-04-26 VITALS
TEMPERATURE: 97.9 F | HEIGHT: 64 IN | SYSTOLIC BLOOD PRESSURE: 148 MMHG | OXYGEN SATURATION: 98 % | DIASTOLIC BLOOD PRESSURE: 95 MMHG | BODY MASS INDEX: 32.51 KG/M2 | HEART RATE: 86 BPM | RESPIRATION RATE: 17 BRPM

## 2019-04-26 LAB
BACTERIA BRONCH AEROBE CULT: ABNORMAL
BACTERIA BRONCH AEROBE CULT: ABNORMAL
ETEST SENSITIVITY ETEST: NORMAL
GRAM STN SPEC: ABNORMAL
SIGNIFICANT IND 70042: ABNORMAL
SITE SITE: ABNORMAL
SOURCE SOURCE: ABNORMAL

## 2019-04-26 PROCEDURE — 302449 STATCHG TRIAGE ONLY (STATISTIC)

## 2019-04-27 ENCOUNTER — HOSPITAL ENCOUNTER (EMERGENCY)
Facility: MEDICAL CENTER | Age: 59
End: 2019-04-27
Attending: EMERGENCY MEDICINE
Payer: MEDICAID

## 2019-04-27 ENCOUNTER — APPOINTMENT (OUTPATIENT)
Dept: RADIOLOGY | Facility: MEDICAL CENTER | Age: 59
End: 2019-04-27
Attending: EMERGENCY MEDICINE
Payer: MEDICAID

## 2019-04-27 VITALS
BODY MASS INDEX: 27.48 KG/M2 | TEMPERATURE: 98.1 F | HEART RATE: 92 BPM | DIASTOLIC BLOOD PRESSURE: 67 MMHG | RESPIRATION RATE: 20 BRPM | HEIGHT: 64 IN | WEIGHT: 160.94 LBS | SYSTOLIC BLOOD PRESSURE: 99 MMHG | OXYGEN SATURATION: 99 %

## 2019-04-27 DIAGNOSIS — F10.921 ALCOHOL INTOXICATION WITH DELIRIUM (HCC): ICD-10-CM

## 2019-04-27 DIAGNOSIS — S93.401A SPRAIN OF RIGHT ANKLE, UNSPECIFIED LIGAMENT, INITIAL ENCOUNTER: ICD-10-CM

## 2019-04-27 LAB — ETHANOL BLD-MCNC: 0.35 G/DL

## 2019-04-27 PROCEDURE — 36415 COLL VENOUS BLD VENIPUNCTURE: CPT

## 2019-04-27 PROCEDURE — 80307 DRUG TEST PRSMV CHEM ANLYZR: CPT

## 2019-04-27 PROCEDURE — 73610 X-RAY EXAM OF ANKLE: CPT | Mod: RT

## 2019-04-27 PROCEDURE — 99284 EMERGENCY DEPT VISIT MOD MDM: CPT

## 2019-04-27 PROCEDURE — 304561 HCHG STAT O2

## 2019-04-27 RX ORDER — CLONAZEPAM 0.5 MG/1
0.25 TABLET ORAL 2 TIMES DAILY
COMMUNITY
End: 2019-04-29

## 2019-04-27 RX ORDER — CARBAMAZEPINE 200 MG/1
200 TABLET ORAL 2 TIMES DAILY
COMMUNITY
End: 2019-08-05

## 2019-04-28 ENCOUNTER — HOSPITAL ENCOUNTER (EMERGENCY)
Dept: HOSPITAL 8 - ED | Age: 59
Discharge: HOME | End: 2019-04-28
Payer: MEDICAID

## 2019-04-28 VITALS — BODY MASS INDEX: 30.12 KG/M2 | HEIGHT: 66 IN | WEIGHT: 187.39 LBS

## 2019-04-28 VITALS — DIASTOLIC BLOOD PRESSURE: 94 MMHG | SYSTOLIC BLOOD PRESSURE: 146 MMHG

## 2019-04-28 DIAGNOSIS — K21.9: ICD-10-CM

## 2019-04-28 DIAGNOSIS — F10.129: Primary | ICD-10-CM

## 2019-04-28 DIAGNOSIS — E11.9: ICD-10-CM

## 2019-04-28 PROCEDURE — 93005 ELECTROCARDIOGRAM TRACING: CPT

## 2019-04-28 PROCEDURE — 99283 EMERGENCY DEPT VISIT LOW MDM: CPT

## 2019-04-28 NOTE — ED NOTES
PT walked away from ED prior to receiving crutches. RN was able get her before she left properties and give her crutches. RN adjusted them for patient height and pt did demonstrate use. She states she used them before. Ambulated toward bus stop.

## 2019-04-28 NOTE — ED NOTES
Pt's medications placed in pharmacy bag and 1 bottle of vodka. Holding belongings at ED RN desk until Pt can be evaluated by MD and behavorial health.

## 2019-04-28 NOTE — ED TRIAGE NOTES
"Patient arrives via EMS from street.  A bystander called EMS - it is unclear why. When EMS arrived she complained that she took 7 klonopin. She did tell EMS she was attempting to harm self. She refused to answer this RN when she was asked about the reason to take this medication, but did mumble something about because she didn't want to keep having the pain in her leg. She is primarily reporting right leg pain, she states it started today. \"I just snapped.\" \"Its broken I know it.\" She has not trauma that she remembers. Leg is not deformed or swollen, no discoloration. Pt admits to heavy ETOH use and taking klonopin today.   [place] suicide screening score was \"HIGH\".   "

## 2019-04-28 NOTE — ED NOTES
Pt has been educated that she is to be discharged on MD orders. She is being verbally aggressive toward this RN.  She has been educated the drinking alcohol and taking klonopin in greater numbers that prescribed is very dangerous. This is the 2nd times she has been in the ED reports she is taken more than prescribed. Klonopin disposed over in med box with witness by Robyn Garcia RN

## 2019-04-28 NOTE — ED NOTES
Pt educated on DC instruction for ETOH abuse. All her belongings except the Klonopin returned to patient. A crutch provided to aid in assistance.

## 2019-04-28 NOTE — ED NOTES
1:1 sitter at bedside.   All unnecessary equipment removed from room. Pt remains on cardiac monitor and suction is at bedside.

## 2019-04-28 NOTE — ED NOTES
PT reported she needed to void. RN when to get wheelchair and by time RN had returned pt had urinated on floor.

## 2019-04-28 NOTE — ED PROVIDER NOTES
ED Provider Note    ED Provider Note    Primary care provider: Pcp Pt States None  Means of arrival: EMS  History obtained from: Patient    CHIEF COMPLAINT  Chief Complaint   Patient presents with   • Ankle Pain   • Alcohol Intoxication     Seen at 5:58 PM.   HPI  Ashleigh Marquis is a 58 y.o. female who presents to the Emergency Department for alcohol intoxication?  The reason for the patient's presentation is unclear.  Apparently EMS was activated by a bystander.  The patient is severely intoxicated at this time and cannot participate in any history.    REVIEW OF SYSTEMS  See HPI,   Remainder of ROS unobtainable.  PAST MEDICAL HISTORY   has a past medical history of Alcoholism (HCC); Anxiety; Arthritis; ASTHMA; Cancer (HCC) (1981); Chronic low back pain; Congestive heart failure (HCC); Depression; EtOH dependence ([facility]); Fall; GERD (gastroesophageal reflux disease); HTN; Hypertension; Indigestion; Muscle disorder; OSTEOPOROSIS; Other specified symptom associated with female genital organs; Psychiatric disorder; PTSD (post-traumatic stress disorder); Renal disorder; Seizure ([facility]); Ulcer; and Vitamin D deficiency.    SURGICAL HISTORY   has a past surgical history that includes hernia repair (1977); cysto stent placemnt pre surg (10/7/2010); cystoscopy stent placement (11/9/2010); ureteroscopy (11/9/2010); lasertripsy (11/9/2010); stent removal (11/9/2010); and gastroscopy-endo (N/A, 10/3/2018).    SOCIAL HISTORY  Social History   Substance Use Topics   • Smoking status: Current Every Day Smoker     Packs/day: 0.50     Years: 20.00     Types: Cigarettes   • Smokeless tobacco: Never Used      Comment: 1/2 pack per day   • Alcohol use Yes      Comment: vodka, heavy use      History   Drug Use No       FAMILY HISTORY  Family History   Problem Relation Age of Onset   • Heart Disease Father    • Hypertension Father    • Diabetes Father    • Cancer Paternal Grandmother    • Depression Other    • Lung Disease Neg Hx    •  "Stroke Neg Hx        CURRENT MEDICATIONS  Reviewed.  See Encounter Summary.     ALLERGIES  Allergies   Allergen Reactions   • Sulfa Drugs Vomiting   • Toradol Vomiting   • Gabapentin      Bowel incontinence     • Amoxicillin Rash     Reported by NAIDA on arrival to ED    Pt states she takes PCN 10/3       PHYSICAL EXAM  VITAL SIGNS: BP (!) 99/67   Pulse 92   Temp 36.7 °C (98.1 °F)   Resp 20   Ht 1.626 m (5' 4\")   Wt 73 kg (160 lb 15 oz)   LMP 11/02/2015   SpO2 99%   BMI 27.62 kg/m²   Constitutional: Sleeping, awakens to sternal rub, stays awake and slurs incoherently.  HENT: Normocephalic, atraumatic, bilateral external ears normal. Nose normal.   Eyes: Conjunctiva normal, non-icteric, EOMI. pupils 3 mm and reactive.  Thorax & Lungs: Easy unlabored respirations, Clear to ascultation bilaterally.  Cardiovascular: Borderline tachycardic, No murmurs, rubs or gallops.  Abdomen:  Soft, nontender, nondistended, normal active bowel sounds.   :    Skin: Visualized skin is  Dry, No erythema, No rash.   Musculoskeletal:   No cyanosis, clubbing or edema.  All major joints and long bones are palpated and found to be free of trauma.  Neurologic: Somnolent, intoxicated, moves all extremities  psychiatric: Unable to assess, intoxicated  lymphatic:  No cervical LAD        RADIOLOGY  DX-ANKLE 3+ VIEWS RIGHT   Final Result      No acute osseous abnormality.            COURSE & MEDICAL DECISION MAKING  Pertinent Labs & Imaging studies reviewed. (See chart for details)      5:58 PM - Medical record reviewed-patient discharged after possible suicide attempt with endotracheal intubation on April 24.  She then represented April 25 with neck pain-discharge from ED.    6:29 PM -patient is severely intoxicated.  There is some question about suicidal ideation.  She is being watched with a sitter.  No further intervention planned at this point.  I reviewed the documentation about possible benzodiazepine overdose, this is not a new " prescription.  The patient overdosed on medications last week and should not have received a new benzodiazepine refill.  Currently she is maintaining her airway and oxygenation is excellent, therefore further work-up and intervention not indicated at this time.  We will continue to monitor.    8:08 PM -patient is now much more alert and sitting up in bed, she is planing of her right ankle.  She states that it is broken.  She cannot give me an exact mechanism.  There is some slight swelling of the lateral malleolus compared to the left ankle.  She does complain of tenderness in this region.  The foot and lower leg are nontender.  X-rays have been ordered for the right ankle.    8:30 PM-the patient was able to stand and transfer unassisted.    Decision Making:  This is a 58 y.o. year old female who presents with alcohol intoxication, vague suicidal ideation and ankle pain.  The patient was significantly intoxicated on arrival and does have a history of alcohol withdrawal.  She did sober appropriately and was not expressing any suicidal ideation when I was eventually able to understand her speech.  At no time did she express any suicidal ideation to me.  The benzodiazepine bottle was filled prior to her last hospitalization, she did not have enough pills to take a life-threatening dose, she also did not have any respiratory depression or hypotension.  Therefore I do not have any concerns over excess benzos today.    With regards to the right ankle, there is no sign of trauma.  X-rays are negative.  The patient was able to bear weight.  She may have a minor ankle sprain.  She was discharged with crutches.    At this time I do not feel the patient needs a legal hold, she is at high risk for alcohol withdrawal and does not have any desire to stop drinking, therefore I feel that the best plan for this patient is to discharge her and have her follow-up with her primary care doctor.    Discharge Medications:  Discharge  Medication List as of 4/27/2019  9:30 PM          The patient was discharged home (see d/c instructions) and parent was told to return immediately for any signs or symptoms listed, or any worsening at all.  The patient's parent verbally agreed to the discharge precautions and follow-up plan which is documented in EPIC.        FINAL IMPRESSION  1. Alcohol intoxication with delirium (HCC)    2. Sprain of right ankle, unspecified ligament, initial encounter

## 2019-04-28 NOTE — ED NOTES
Xray at bedside.   PT asking for more food. RN educated her that she needs to wait until xray result.

## 2019-04-29 ENCOUNTER — HOSPITAL ENCOUNTER (EMERGENCY)
Facility: MEDICAL CENTER | Age: 59
End: 2019-04-29
Attending: EMERGENCY MEDICINE
Payer: MEDICAID

## 2019-04-29 ENCOUNTER — HOSPITAL ENCOUNTER (INPATIENT)
Facility: MEDICAL CENTER | Age: 59
LOS: 10 days | DRG: 918 | End: 2019-05-11
Attending: EMERGENCY MEDICINE | Admitting: HOSPITALIST
Payer: MEDICAID

## 2019-04-29 VITALS
OXYGEN SATURATION: 97 % | DIASTOLIC BLOOD PRESSURE: 85 MMHG | SYSTOLIC BLOOD PRESSURE: 131 MMHG | TEMPERATURE: 97.5 F | HEART RATE: 78 BPM | RESPIRATION RATE: 16 BRPM

## 2019-04-29 VITALS
BODY MASS INDEX: 27.31 KG/M2 | SYSTOLIC BLOOD PRESSURE: 133 MMHG | HEIGHT: 64 IN | RESPIRATION RATE: 14 BRPM | OXYGEN SATURATION: 95 % | HEART RATE: 77 BPM | DIASTOLIC BLOOD PRESSURE: 88 MMHG | TEMPERATURE: 97.9 F | WEIGHT: 160 LBS

## 2019-04-29 DIAGNOSIS — F31.81 BIPOLAR 2 DISORDER (HCC): ICD-10-CM

## 2019-04-29 DIAGNOSIS — F10.920 ALCOHOLIC INTOXICATION WITHOUT COMPLICATION (HCC): ICD-10-CM

## 2019-04-29 DIAGNOSIS — R45.851 SUICIDAL IDEATION: ICD-10-CM

## 2019-04-29 DIAGNOSIS — F10.10 ALCOHOL ABUSE: ICD-10-CM

## 2019-04-29 DIAGNOSIS — T50.902A INTENTIONAL DRUG OVERDOSE, INITIAL ENCOUNTER (HCC): ICD-10-CM

## 2019-04-29 DIAGNOSIS — F10.921 ALCOHOL INTOXICATION WITH DELIRIUM (HCC): ICD-10-CM

## 2019-04-29 DIAGNOSIS — F43.10 PTSD (POST-TRAUMATIC STRESS DISORDER): ICD-10-CM

## 2019-04-29 PROBLEM — F10.929 ALCOHOL INTOXICATION (HCC): Status: ACTIVE | Noted: 2019-04-29

## 2019-04-29 PROBLEM — D64.9 ANEMIA: Status: ACTIVE | Noted: 2019-04-29

## 2019-04-29 PROBLEM — E16.2 HYPOGLYCEMIA: Status: ACTIVE | Noted: 2019-04-29

## 2019-04-29 LAB
ALBUMIN SERPL BCP-MCNC: 4.2 G/DL (ref 3.2–4.9)
ALBUMIN/GLOB SERPL: 1.7 G/DL
ALP SERPL-CCNC: 141 U/L (ref 30–99)
ALT SERPL-CCNC: 19 U/L (ref 2–50)
AMPHET UR QL SCN: NEGATIVE
ANION GAP SERPL CALC-SCNC: 17 MMOL/L (ref 0–11.9)
APAP SERPL-MCNC: <10 UG/ML (ref 10–30)
AST SERPL-CCNC: 32 U/L (ref 12–45)
BARBITURATES UR QL SCN: POSITIVE
BASOPHILS # BLD AUTO: 2 % (ref 0–1.8)
BASOPHILS # BLD: 0.16 K/UL (ref 0–0.12)
BENZODIAZ UR QL SCN: NEGATIVE
BILIRUB SERPL-MCNC: 0.4 MG/DL (ref 0.1–1.5)
BUN SERPL-MCNC: 8 MG/DL (ref 8–22)
BZE UR QL SCN: NEGATIVE
CALCIUM SERPL-MCNC: 9.2 MG/DL (ref 8.5–10.5)
CANNABINOIDS UR QL SCN: NEGATIVE
CHLORIDE SERPL-SCNC: 106 MMOL/L (ref 96–112)
CO2 SERPL-SCNC: 19 MMOL/L (ref 20–33)
CREAT SERPL-MCNC: 0.64 MG/DL (ref 0.5–1.4)
EKG IMPRESSION: NORMAL
EOSINOPHIL # BLD AUTO: 0.05 K/UL (ref 0–0.51)
EOSINOPHIL NFR BLD: 0.6 % (ref 0–6.9)
ERYTHROCYTE [DISTWIDTH] IN BLOOD BY AUTOMATED COUNT: 55.5 FL (ref 35.9–50)
ETHANOL BLD-MCNC: 0.27 G/DL
FERRITIN SERPL-MCNC: 26.4 NG/ML (ref 10–291)
FOLATE SERPL-MCNC: 21.9 NG/ML
GLOBULIN SER CALC-MCNC: 2.5 G/DL (ref 1.9–3.5)
GLUCOSE BLD-MCNC: 60 MG/DL (ref 65–99)
GLUCOSE BLD-MCNC: 81 MG/DL (ref 65–99)
GLUCOSE SERPL-MCNC: 59 MG/DL (ref 65–99)
HCT VFR BLD AUTO: 35.8 % (ref 37–47)
HGB BLD-MCNC: 11.3 G/DL (ref 12–16)
HGB RETIC QN AUTO: 30 PG/CELL (ref 29–35)
IMM GRANULOCYTES # BLD AUTO: 0.03 K/UL (ref 0–0.11)
IMM GRANULOCYTES NFR BLD AUTO: 0.4 % (ref 0–0.9)
IMM RETICS NFR: 15.7 % (ref 9.3–17.4)
IRON SATN MFR SERPL: 8 % (ref 15–55)
IRON SERPL-MCNC: 37 UG/DL (ref 40–170)
LIPASE SERPL-CCNC: 39 U/L (ref 11–82)
LYMPHOCYTES # BLD AUTO: 2.36 K/UL (ref 1–4.8)
LYMPHOCYTES NFR BLD: 29.6 % (ref 22–41)
MCH RBC QN AUTO: 30.7 PG (ref 27–33)
MCHC RBC AUTO-ENTMCNC: 31.6 G/DL (ref 33.6–35)
MCV RBC AUTO: 97.3 FL (ref 81.4–97.8)
METHADONE UR QL SCN: NEGATIVE
MONOCYTES # BLD AUTO: 0.54 K/UL (ref 0–0.85)
MONOCYTES NFR BLD AUTO: 6.8 % (ref 0–13.4)
NEUTROPHILS # BLD AUTO: 4.84 K/UL (ref 2–7.15)
NEUTROPHILS NFR BLD: 60.6 % (ref 44–72)
NRBC # BLD AUTO: 0 K/UL
NRBC BLD-RTO: 0 /100 WBC
OPIATES UR QL SCN: NEGATIVE
OXYCODONE UR QL SCN: NEGATIVE
PCP UR QL SCN: NEGATIVE
PLATELET # BLD AUTO: 619 K/UL (ref 164–446)
PMV BLD AUTO: 9.3 FL (ref 9–12.9)
POC BREATHALIZER: 0.17 PERCENT (ref 0–0.01)
POC BREATHALIZER: 0.41 PERCENT (ref 0–0.01)
POTASSIUM SERPL-SCNC: 3.6 MMOL/L (ref 3.6–5.5)
PROPOXYPH UR QL SCN: NEGATIVE
PROT SERPL-MCNC: 6.7 G/DL (ref 6–8.2)
RBC # BLD AUTO: 3.68 M/UL (ref 4.2–5.4)
RETICS # AUTO: 0.05 M/UL (ref 0.04–0.06)
RETICS/RBC NFR: 1.3 % (ref 0.8–2.1)
SALICYLATES SERPL-MCNC: 0 MG/DL (ref 15–25)
SODIUM SERPL-SCNC: 142 MMOL/L (ref 135–145)
TIBC SERPL-MCNC: 459 UG/DL (ref 250–450)
VIT B12 SERPL-MCNC: 284 PG/ML (ref 211–911)
WBC # BLD AUTO: 8 K/UL (ref 4.8–10.8)

## 2019-04-29 PROCEDURE — G0378 HOSPITAL OBSERVATION PER HR: HCPCS

## 2019-04-29 PROCEDURE — 83690 ASSAY OF LIPASE: CPT

## 2019-04-29 PROCEDURE — 302970 POC BREATHALIZER: Performed by: EMERGENCY MEDICINE

## 2019-04-29 PROCEDURE — 83540 ASSAY OF IRON: CPT

## 2019-04-29 PROCEDURE — 80053 COMPREHEN METABOLIC PANEL: CPT

## 2019-04-29 PROCEDURE — 700105 HCHG RX REV CODE 258: Performed by: EMERGENCY MEDICINE

## 2019-04-29 PROCEDURE — 99220 PR INITIAL OBSERVATION CARE,LEVL III: CPT | Performed by: HOSPITALIST

## 2019-04-29 PROCEDURE — 93005 ELECTROCARDIOGRAM TRACING: CPT | Performed by: EMERGENCY MEDICINE

## 2019-04-29 PROCEDURE — 700101 HCHG RX REV CODE 250: Performed by: HOSPITALIST

## 2019-04-29 PROCEDURE — 85025 COMPLETE CBC W/AUTO DIFF WBC: CPT

## 2019-04-29 PROCEDURE — 302970 POC BREATHALIZER

## 2019-04-29 PROCEDURE — 80307 DRUG TEST PRSMV CHEM ANLYZR: CPT

## 2019-04-29 PROCEDURE — 82746 ASSAY OF FOLIC ACID SERUM: CPT

## 2019-04-29 PROCEDURE — 82962 GLUCOSE BLOOD TEST: CPT | Mod: 91

## 2019-04-29 PROCEDURE — 99285 EMERGENCY DEPT VISIT HI MDM: CPT

## 2019-04-29 PROCEDURE — 82728 ASSAY OF FERRITIN: CPT

## 2019-04-29 PROCEDURE — 83550 IRON BINDING TEST: CPT

## 2019-04-29 PROCEDURE — 96374 THER/PROPH/DIAG INJ IV PUSH: CPT

## 2019-04-29 PROCEDURE — 82607 VITAMIN B-12: CPT

## 2019-04-29 PROCEDURE — 85046 RETICYTE/HGB CONCENTRATE: CPT

## 2019-04-29 PROCEDURE — 99284 EMERGENCY DEPT VISIT MOD MDM: CPT

## 2019-04-29 RX ORDER — HEPARIN SODIUM 5000 [USP'U]/ML
5000 INJECTION, SOLUTION INTRAVENOUS; SUBCUTANEOUS EVERY 8 HOURS
Status: DISCONTINUED | OUTPATIENT
Start: 2019-04-29 | End: 2019-05-11 | Stop reason: HOSPADM

## 2019-04-29 RX ORDER — SODIUM CHLORIDE 9 MG/ML
1000 INJECTION, SOLUTION INTRAVENOUS ONCE
Status: COMPLETED | OUTPATIENT
Start: 2019-04-29 | End: 2019-04-29

## 2019-04-29 RX ORDER — CLONAZEPAM 1 MG/1
0.5 TABLET ORAL 3 TIMES DAILY PRN
Status: ON HOLD | COMMUNITY
End: 2019-05-11

## 2019-04-29 RX ORDER — DEXTROSE AND SODIUM CHLORIDE 5; .9 G/100ML; G/100ML
INJECTION, SOLUTION INTRAVENOUS CONTINUOUS
Status: DISCONTINUED | OUTPATIENT
Start: 2019-04-29 | End: 2019-05-02

## 2019-04-29 RX ORDER — ONDANSETRON 4 MG/1
4 TABLET, ORALLY DISINTEGRATING ORAL EVERY 4 HOURS PRN
Status: DISCONTINUED | OUTPATIENT
Start: 2019-04-29 | End: 2019-05-11 | Stop reason: HOSPADM

## 2019-04-29 RX ORDER — LORAZEPAM 2 MG/1
2 TABLET ORAL
Status: DISCONTINUED | OUTPATIENT
Start: 2019-04-29 | End: 2019-05-03

## 2019-04-29 RX ORDER — DEXTROSE MONOHYDRATE 25 G/50ML
25 INJECTION, SOLUTION INTRAVENOUS
Status: DISCONTINUED | OUTPATIENT
Start: 2019-04-29 | End: 2019-04-30

## 2019-04-29 RX ORDER — POLYETHYLENE GLYCOL 3350 17 G/17G
1 POWDER, FOR SOLUTION ORAL
Status: DISCONTINUED | OUTPATIENT
Start: 2019-04-29 | End: 2019-05-11 | Stop reason: HOSPADM

## 2019-04-29 RX ORDER — LORAZEPAM 2 MG/ML
1 INJECTION INTRAMUSCULAR
Status: DISCONTINUED | OUTPATIENT
Start: 2019-04-29 | End: 2019-05-03

## 2019-04-29 RX ORDER — THIAMINE MONONITRATE (VIT B1) 100 MG
100 TABLET ORAL DAILY
Status: COMPLETED | OUTPATIENT
Start: 2019-04-30 | End: 2019-05-03

## 2019-04-29 RX ORDER — ONDANSETRON 2 MG/ML
4 INJECTION INTRAMUSCULAR; INTRAVENOUS EVERY 4 HOURS PRN
Status: DISCONTINUED | OUTPATIENT
Start: 2019-04-29 | End: 2019-05-11 | Stop reason: HOSPADM

## 2019-04-29 RX ORDER — LORAZEPAM 1 MG/1
0.5 TABLET ORAL EVERY 4 HOURS PRN
Status: DISCONTINUED | OUTPATIENT
Start: 2019-04-29 | End: 2019-05-03

## 2019-04-29 RX ORDER — AMOXICILLIN 250 MG
2 CAPSULE ORAL 2 TIMES DAILY
Status: DISCONTINUED | OUTPATIENT
Start: 2019-04-29 | End: 2019-05-11 | Stop reason: HOSPADM

## 2019-04-29 RX ORDER — PROMETHAZINE HYDROCHLORIDE 25 MG/1
12.5-25 TABLET ORAL EVERY 4 HOURS PRN
Status: DISCONTINUED | OUTPATIENT
Start: 2019-04-29 | End: 2019-05-11 | Stop reason: HOSPADM

## 2019-04-29 RX ORDER — PROMETHAZINE HYDROCHLORIDE 25 MG/1
12.5-25 SUPPOSITORY RECTAL EVERY 4 HOURS PRN
Status: DISCONTINUED | OUTPATIENT
Start: 2019-04-29 | End: 2019-05-11 | Stop reason: HOSPADM

## 2019-04-29 RX ORDER — LORAZEPAM 1 MG/1
1 TABLET ORAL EVERY 4 HOURS PRN
Status: DISCONTINUED | OUTPATIENT
Start: 2019-04-29 | End: 2019-05-03

## 2019-04-29 RX ORDER — LORAZEPAM 2 MG/ML
2 INJECTION INTRAMUSCULAR
Status: DISCONTINUED | OUTPATIENT
Start: 2019-04-29 | End: 2019-05-03

## 2019-04-29 RX ORDER — FOLIC ACID 1 MG/1
1 TABLET ORAL DAILY
Status: COMPLETED | OUTPATIENT
Start: 2019-04-30 | End: 2019-05-03

## 2019-04-29 RX ORDER — LORAZEPAM 2 MG/ML
1.5 INJECTION INTRAMUSCULAR
Status: DISCONTINUED | OUTPATIENT
Start: 2019-04-29 | End: 2019-05-03

## 2019-04-29 RX ORDER — BISACODYL 10 MG
10 SUPPOSITORY, RECTAL RECTAL
Status: DISCONTINUED | OUTPATIENT
Start: 2019-04-29 | End: 2019-05-11 | Stop reason: HOSPADM

## 2019-04-29 RX ORDER — LORAZEPAM 2 MG/ML
0.5 INJECTION INTRAMUSCULAR EVERY 4 HOURS PRN
Status: DISCONTINUED | OUTPATIENT
Start: 2019-04-29 | End: 2019-05-03

## 2019-04-29 RX ORDER — LORAZEPAM 2 MG/1
4 TABLET ORAL
Status: DISCONTINUED | OUTPATIENT
Start: 2019-04-29 | End: 2019-05-03

## 2019-04-29 RX ORDER — SODIUM CHLORIDE 9 MG/ML
INJECTION, SOLUTION INTRAVENOUS CONTINUOUS
Status: DISCONTINUED | OUTPATIENT
Start: 2019-04-29 | End: 2019-04-29

## 2019-04-29 RX ADMIN — SODIUM CHLORIDE 1000 ML: 9 INJECTION, SOLUTION INTRAVENOUS at 17:26

## 2019-04-29 RX ADMIN — THIAMINE HYDROCHLORIDE: 100 INJECTION, SOLUTION INTRAMUSCULAR; INTRAVENOUS at 22:59

## 2019-04-29 ASSESSMENT — LIFESTYLE VARIABLES
DO YOU DRINK ALCOHOL: YES
HAVE PEOPLE ANNOYED YOU BY CRITICIZING YOUR DRINKING: YES
HOW MANY TIMES IN THE PAST YEAR HAVE YOU HAD 5 OR MORE DRINKS IN A DAY: 365
AVERAGE NUMBER OF DAYS PER WEEK YOU HAVE A DRINK CONTAINING ALCOHOL: 7
ON A TYPICAL DAY WHEN YOU DRINK ALCOHOL HOW MANY DRINKS DO YOU HAVE: 10
EVER HAD A DRINK FIRST THING IN THE MORNING TO STEADY YOUR NERVES TO GET RID OF A HANGOVER: YES
EVER FELT BAD OR GUILTY ABOUT YOUR DRINKING: YES
TOTAL SCORE: 4
TOTAL SCORE: 4
DOES PATIENT WANT TO STOP DRINKING: NO
CONSUMPTION TOTAL: POSITIVE
DO YOU DRINK ALCOHOL: YES
TOTAL SCORE: 4
HAVE YOU EVER FELT YOU SHOULD CUT DOWN ON YOUR DRINKING: YES

## 2019-04-29 ASSESSMENT — PAIN SCALES - WONG BAKER: WONGBAKER_NUMERICALRESPONSE: HURTS EVEN MORE

## 2019-04-29 NOTE — ED NOTES
Patient got up and ambulated to exit with steady gait.  Patient refusing to return to room.  Patient denies any needs.  Patient has all belongings in position.   Patient was already up for d/c.  Patient refused to get d/c papers.

## 2019-04-29 NOTE — ED NOTES
Pt belongings removed, all equipment left that is needed in the care of hte patient, all others removed, pt is presently stating self harm when she can. Pt states that she doesnot want to live.

## 2019-04-29 NOTE — ED PROVIDER NOTES
"ED Provider Note    CHIEF COMPLAINT  Chief Complaint   Patient presents with   • Alcohol Intoxication       HPI  Ashleigh Marquis is a 58 y.o. female who presents for her third visit in 2 days after losing control of her bladder wanting help for her alcoholism.  Patient has been a frequent visitor to this ER for intoxication including last night and was discharged from the ER earlier this morning.  History of heavy daily alcohol use for more than 10 years.  She reports prior history of withdrawal with tremors hallucinations and seizures.  She drinks as much as she can get per day.  Denies bleeding or abdominal pain.  She did vomit once today.  Recent overdose on benzodiazepines admitted to ICU this week.  Currently denies severe depression or suicidal ideation.    REVIEW OF SYSTEMS  Pertinent positives include: Urinary incontinence, chronic alcohol intoxication, history of withdrawal, vomiting.  Pertinent negatives include: Head injury, trauma, abdominal pain, fever, cough.    PAST MEDICAL HISTORY  Past Medical History:   Diagnosis Date   • Alcoholism (HCC)    • Anxiety    • Arthritis    • ASTHMA    • Cancer (HCC) 1981    cervical   • Chronic low back pain    • Congestive heart failure (HCC)    • Depression    • EtOH dependence (HCC)    • Fall     alcohol related   • GERD (gastroesophageal reflux disease)    • HTN    • Hypertension    • Indigestion     GERD   • Muscle disorder    • OSTEOPOROSIS    • Other specified symptom associated with female genital organs     \"I have fibroids bad\"\"   • Psychiatric disorder    • PTSD (post-traumatic stress disorder)    • Renal disorder     Hx of stones   • Seizure (HCC)     several years ago r/t alcohol withdrawl   • Ulcer    • Vitamin D deficiency        SOCIAL HISTORY  Social History   Substance Use Topics   • Smoking status: Current Every Day Smoker     Packs/day: 0.50     Years: 20.00     Types: Cigarettes   • Smokeless tobacco: Never Used      Comment: 1/2 pack per day "   • Alcohol use Yes      Comment: vodka, heavy use       CURRENT MEDICATIONS  No current facility-administered medications for this encounter.     Current Outpatient Prescriptions:   •  clonazePAM (KLONOPIN) 0.5 MG Tab, Take 0.25 mg by mouth 2 times a day., Disp: , Rfl:   •  carBAMazepine (TEGRETOL) 200 MG Tab, Take 200 mg by mouth 2 Times a Day., Disp: , Rfl:   •  lisinopril (PRINIVIL) 10 MG Tab, Take 1 Tab by mouth every day., Disp: 60 Tab, Rfl: 1  •  omeprazole (PRILOSEC) 20 MG delayed-release capsule, Take 1 Cap by mouth every day., Disp: 30 Cap, Rfl: 3  •  fluoxetine (PROZAC) 40 MG capsule, Take 40 mg by mouth every day., Disp: , Rfl:       ALLERGIES  Allergies   Allergen Reactions   • Sulfa Drugs Vomiting   • Toradol Vomiting   • Gabapentin      Bowel incontinence     • Amoxicillin Rash     Reported by NAIDA on arrival to ED    Pt states she takes PCN 10/3       PHYSICAL EXAM  VITAL SIGNS: /85   Pulse 78   Temp 36.4 °C (97.5 °F) (Temporal)   Resp 16   LMP 11/02/2015   SpO2 97% Mildly elevated blood pressure, afebrile  Constitutional :  Well developed, Well nourished, well-appearing, intoxicated.   HNT: Oropharynx moist without erythema or exudate, very minimal tongue tremor.  No hematomas or wounds  Ears: External ears normal.  Eyes: pupils reactive without eye discharge nor conjunctival hyperemia.  No scleral icterus  Cardiovascular: Regular rhythm, No murmurs, No rubs, No gallops.  No cyanosis.   Respiratory: No rales, rhonchi, wheeze  Abdomen:  Soft, nontender distended, no masses  Skin: Warm, dry, no erythema, no rash.  No jaundice  Musculoskeletal: no limb deformities.      LABORATORY:  Results for orders placed or performed during the hospital encounter of 04/29/19   POC BREATHALIZER   Result Value Ref Range    POC Breathalizer 0.175 (A) 0.00 - 0.01 Percent     Labs from multiple visits this month reviewed.  Patient has a hemoglobin baseline of 10.  AST is very minimally elevated late  [place] [id number].  She is not coagulopathic.    Resources were obtained from [facility] for inpatient detox and medically managed outpatient detox.  The nurse Lilli help the patient call the first 2 sites.  The patient refused to call her third and left the ER without signing AGAINST MEDICAL ADVICE forms.    COURSE & MEDICAL DECISION MAKING  This patient with a history of alcoholism presents for help with her drinking.  She has a history of withdrawal but is a poor candidate for benzodiazepines given recent overdose attempt.  She is a good candidate for inpatient detox and placement will be attempted.  At this point there is no benefit to repeat laboratory testing.  Patient states she wants help with alcoholism and a detox program but refused to make calls to try to arrange an outpatient detox.  At this point there is no clinical withdrawal or indication for laboratory testing.  Patient was not sufficiently intoxicated that she was incapable of making a decision to leave.  She is not so intoxicated that she is a danger to herself discharge.    This patient has borderline or elevated blood pressure as recorded above and was instructed to followup with primary physician for comprehensive blood pressure evaluation and yearly fasting cholesterol assessment according to to CMS protocol.    PLAN:  Patient left via department without discharge paperwork or signing the AMA form  Outpatient detox already recommended to patient    CONDITION:  Unknown    FINAL IMPRESSION:  1. Alcoholic intoxication without complication (HCC)    2. Alcohol abuse          Electronically signed by: Marcello Melton, 4/29/2019

## 2019-04-29 NOTE — ED PROVIDER NOTES
ED Provider Note    CHIEF COMPLAINT  Chief Complaint   Patient presents with   • Alcohol Problem     found passed out drunk at 7/11, patient has no complaints       HPI  Ashleigh Marquis is a 58 y.o. female who presents to the emergency department with chief complaint of being intoxicated.  Patient was found passed out from 711 this evening.  She was seen here on the 27th of this month for similar complaint.  She had been seen on the 25th with an suicide attempt in the neck pain and possible overdose with benzos and no one refilled her benzos she had an ankle sprain the other night when she was more sober.  At this time the patient is awake to her name but cannot refusing to answer questions but denying any complaints    REVIEW OF SYSTEMS  Positives as above. Pertinent negatives include chest pain nausea vomiting otherwise limited secondary to patient compliance  All other review of systems are negative    PAST MEDICAL HISTORY   has a past medical history of Alcoholism ([facility]); Anxiety; Arthritis; ASTHMA; Cancer ([facility]) (1981); Chronic low back pain; Congestive heart failure ([facility]); Depression; EtOH dependence ([facility]); Fall; GERD (gastroesophageal reflux disease); HTN; Hypertension; Indigestion; Muscle disorder; OSTEOPOROSIS; Other specified symptom associated with female genital organs; Psychiatric disorder; PTSD (post-traumatic stress disorder); Renal disorder; Seizure ([facility]); Ulcer; and Vitamin D deficiency.    SOCIAL HISTORY  Social History     Social History Main Topics   • Smoking status: Current Every Day Smoker     Packs/day: 0.50     Years: 20.00     Types: Cigarettes   • Smokeless tobacco: Never Used      Comment: 1/2 pack per day   • Alcohol use Yes      Comment: vodka, heavy use   • Drug use: No   • Sexual activity: No       SURGICAL HISTORY   has a past surgical history that includes hernia repair (1977); cysto stent placemnt pre surg (10/7/2010); cystoscopy stent placement (11/9/2010); ureteroscopy  "(11/9/2010); lasertripsy (11/9/2010); stent removal (11/9/2010); and gastroscopy-endo (N/A, 10/3/2018).    CURRENT MEDICATIONS  Home Medications     Reviewed by Gibson Padron R.N. (Registered Nurse) on 04/29/19 at 0214  Med List Status: Complete   Medication Last Dose Status   carBAMazepine (TEGRETOL) 200 MG Tab  Active   clonazePAM (KLONOPIN) 0.5 MG Tab  Active   fluoxetine (PROZAC) 40 MG capsule  Active   lisinopril (PRINIVIL) 10 MG Tab  Active   omeprazole (PRILOSEC) 20 MG delayed-release capsule  Active                ALLERGIES  Allergies   Allergen Reactions   • Sulfa Drugs Vomiting   • Toradol Vomiting   • Gabapentin      Bowel incontinence     • Amoxicillin Rash     Reported by NAIDA on arrival to ED    Pt states she takes PCN 10/3       PHYSICAL EXAM  VITAL SIGNS: /88   Pulse 77   Temp 36.6 °C (97.9 °F) (Temporal)   Resp 14   Ht 1.626 m (5' 4\")   Wt 72.6 kg (160 lb)   LMP 11/02/2015   SpO2 95%   BMI 27.46 kg/m²    Pulse ox interpretation: I interpret this pulse ox as normal.  Constitutional: Alert in no apparent distress.  HENT: Normocephalic, Atraumatic, MMM  Eyes: PERound. Conjunctiva normal, non-icteric.   Heart: Regular rate and rhythm, no murmurs.    Lungs: Clear to auscultation bilaterally. No resp distress, breath sounds equal  Abdomen: Non-tender, non-distended, normal bowel sounds  Skin: Warm, Dry, No erythema, No rash.   Neurologic: Alert and oriented, Grossly non-focal.       DIFFERENTIAL DIAGNOSIS AND WORK UP PLAN    This is a 58 y.o. female who presents with alcohol intoxication normal vital signs and no complaints.  Were not sure who called EMS but this is a second time in 2 days that she is been brought in for alcohol intoxication.  At this time we will continue to observe her in the emergency department and evaluate her once she is more clinically sober.  Her vital signs are stable there is no signs of trauma on the patient nor evidence that she is not protecting her airway " is all you have to do see her name and she opens her eyes     The patient will return for new or worsening symptoms and is stable at the time of discharge.    The patient is referred to a primary physician for blood pressure management, diabetic screening, and for all other preventative health concerns.    DISPOSITION:  Patient will be discharged home in stable condition.    FOLLOW UP:  [facility], Emergency Dept  [address]  [place] [id number] [phone]    If symptoms worsen      OUTPATIENT MEDICATIONS:  New Prescriptions    No medications on file           FINAL IMPRESSION  1. Alcoholic intoxication without complication (HCC)                 Electronically signed by: Ashleigh Stratton, 4/29/2019 2:17 AM    This dictation has been created using voice recognition software and/or scribes. The accuracy of the dictation is limited by the abilities of the software and the expertise of the scribes. I expect there may be some errors of grammar and possibly content. I made every attempt to manually correct the errors within my dictation. However, errors related to voice recognition software and/or scribes may still exist and should be interpreted within the appropriate context.

## 2019-04-29 NOTE — ED NOTES
"Pt verbalizes that she \"does not want to drink anymore.\"  Pt states \"I peed myself, I do not want to take another drink.\"  Pt states hx of rehab x1, many years ago.  Pt states that she has a hx of seizures with ETOH withdrawal.    "

## 2019-04-29 NOTE — ED NOTES
"Phone dialed again for pt.  Pt stating \"I don't need any help, I already have my resources.\"  Pt on phone with second inpatient facility.   "

## 2019-04-29 NOTE — ED NOTES
Pt assisted with phone to call inpatient detox centers.  Per ALERT RN, pt must call facilities for inpatient treatment.

## 2019-04-29 NOTE — ED NOTES
Patient resting quietly in bed with eyes closed without distress, no apparent needs at this time.  Good chest rise and fall.  Call bell within reach

## 2019-04-29 NOTE — ED NOTES
"Pt off phone.  RN to room to help pt dial 3rd facility.  Pt stating \"I don't want that, I jut want to go to the bathroom.\"  Pt up putting on clothes.  Pt out at desk, handing back phone \"I don't want to call anyone else.  I'm going to leave.\"  Pt walking with steady gait.  Pt aware that she has not been discharged, to wait in room for MD, sign AMA paperwork.  Pt refusing and walking out with steady gait, A&Ox4.  "

## 2019-04-29 NOTE — ED PROVIDER NOTES
ED Provider Note         6:35 AM, patient evaluated bedside.  She is resting and appears comfortable.  She is can awaken and answer questions but she does seem still quite somnolent.  Not clinically sober at this time.  Patient care was signed out from night ERP.  Patient's been here multiple times this month.        Patient ambulated with steady gait and was discharged.

## 2019-04-29 NOTE — ED PROVIDER NOTES
"ED Provider Note    Scribed for Eduardo Eid M.D. by Nav Tavares. 4/29/2019  3:57 PM    Primary care provider: Pcp Pt States None  Means of arrival: EMS  History obtained from: Patient, EMS  History limited by: altered mental status    CHIEF COMPLAINT  Chief Complaint   Patient presents with   • Suicidal Ideation   • Drug Overdose       HPI  Ashleigh Marquis is a 58 y.o. female who presents to the Emergency Department for evaluation of suicidal ideation post drug overdose. Patient reports that she \" is in a lot of pain, and took a handful of pills\". She confirms that she took her antidepressants and antihypertensives in a handful, unknown time and unknown amount. EMS reports that the patient took lisinopril, Prozac with approximately 120 pills missing from her prescription. She was found at a bus stop altered and brought to the ED for reevaluation. Review of prior notes indicate the patient was evaluated twice today in the ED for alcohol intoxication, the second time leaving AMA.     Further HPI cannot be obtained due to the patient's altered mental status.    REVIEW OF SYSTEMS  Pertinent positives include suicidal ideation, drug overdose.     Further ROS cannot be obtained due to the patient's altered mental status.    PAST MEDICAL HISTORY   has a past medical history of Alcoholism ([facility]); Anxiety; Arthritis; ASTHMA; Cancer (HCC) (1981); Chronic low back pain; Congestive heart failure (HCC); Depression; EtOH dependence ([facility]); Fall; GERD (gastroesophageal reflux disease); HTN; Hypertension; Indigestion; Muscle disorder; OSTEOPOROSIS; Other specified symptom associated with female genital organs; Psychiatric disorder; PTSD (post-traumatic stress disorder); Renal disorder; Seizure (HCC); Ulcer; and Vitamin D deficiency.    SURGICAL HISTORY   has a past surgical history that includes hernia repair (1977); cysto stent placemnt pre surg (10/7/2010); cystoscopy stent placement (11/9/2010); ureteroscopy " (11/9/2010); lasertripsy (11/9/2010); stent removal (11/9/2010); and gastroscopy-endo (N/A, 10/3/2018).    SOCIAL HISTORY  Social History   Substance Use Topics   • Smoking status: Current Every Day Smoker     Packs/day: 0.50     Years: 20.00     Types: Cigarettes   • Smokeless tobacco: Never Used      Comment: 1/2 pack per day   • Alcohol use Yes      Comment: vodka, heavy use      History   Drug Use No       FAMILY HISTORY  Family History   Problem Relation Age of Onset   • Heart Disease Father    • Hypertension Father    • Diabetes Father    • Cancer Paternal Grandmother    • Depression Other    • Lung Disease Neg Hx    • Stroke Neg Hx        CURRENT MEDICATIONS  Current Outpatient Prescriptions:   •  clonazePAM (KLONOPIN) 0.5 MG Tab, Take 0.25 mg by mouth 2 times a day., Disp: , Rfl:   •  carBAMazepine (TEGRETOL) 200 MG Tab, Take 200 mg by mouth 2 Times a Day., Disp: , Rfl:   •  lisinopril (PRINIVIL) 10 MG Tab, Take 1 Tab by mouth every day., Disp: 60 Tab, Rfl: 1  •  omeprazole (PRILOSEC) 20 MG delayed-release capsule, Take 1 Cap by mouth every day., Disp: 30 Cap, Rfl: 3  •  fluoxetine (PROZAC) 40 MG capsule, Take 40 mg by mouth every day., Disp: , Rfl:     ALLERGIES  Allergies   Allergen Reactions   • Sulfa Drugs Vomiting   • Toradol Vomiting   • Gabapentin      Bowel incontinence     • Amoxicillin Rash     Reported by NAIDA on arrival to ED    Pt states she takes PCN 10/3       PHYSICAL EXAM  VITAL SIGNS: /40   Pulse 91   Resp 19   Wt 72.6 kg (160 lb)   LMP 11/02/2015   BMI 27.46 kg/m²     Constitutional: Well developed, Well nourished, Mild distress, Non-toxic appearance. Disheveled.   HENT: Normocephalic, Atraumatic, Bilateral external ears normal, Oropharynx moist, No oral exudates. Dry mucous membranes  Eyes: PERRLA, EOMI, Conjunctiva normal, No discharge.   Neck: No tenderness, Supple, No stridor.   Lymphatic: No lymphadenopathy noted.   Cardiovascular: Normal heart rate, Normal rhythm.    Thorax & Lungs: Clear to auscultation bilaterally, No respiratory distress, No wheezing, No crackles.   Abdomen: Soft, Mild epigastric abdominal tenderness to palpation, No masses, No pulsatile masses.   Skin: Warm, Dry, No erythema, No rash. Previous bilateral linear scars on the forearms.   Extremities:, No edema No cyanosis.   Musculoskeletal: No tenderness to palpation or major deformities noted.  Intact distal pulses  Neurologic: Sleepy, slurred speech.   Psychiatric: Poor eye contact. Possible SI.   LABS  Labs Reviewed   URINE DRUG SCREEN - Abnormal; Notable for the following:        Result Value    Barbiturates Positive (*)     All other components within normal limits   CBC WITH DIFFERENTIAL - Abnormal; Notable for the following:     RBC 3.68 (*)     Hemoglobin 11.3 (*)     Hematocrit 35.8 (*)     MCHC 31.6 (*)     RDW 55.5 (*)     Platelet Count 619 (*)     Basophils 2.00 (*)     Baso (Absolute) 0.16 (*)     All other components within normal limits   POC BREATHALIZER - Abnormal; Notable for the following:     POC Breathalizer 0.41 (*)     All other components within normal limits   DIAGNOSTIC ALCOHOL   COMP METABOLIC PANEL   LIPASE   ACETAMINOPHEN   SALICYLATE   All labs reviewed by me.    EKG  Results for orders placed or performed during the hospital encounter of 19   EKG (NOW)   Result Value Ref Range    Report       [facility] Emergency Dept.    Test Date:  2019  Pt Name:    LAKSHMI DARLING              Department: ER  MRN:        [id number]                      Room:       [facility]  Gender:     Female                       Technician: [id number]  :        [id number]                   Requested By:ZENOBIA SO  Order #:    [id number]                    Reading MD: ZENOBIA SO MD    Measurements  Intervals                                Axis  Rate:       81                           P:          69  MO:         152                          QRS:        15  QRSD:        94                           T:          23  QT:         388  QTc:        451    Interpretive Statements  SINUS RHYTHM  CONSIDER RVH OR POSTERIOR INFARCT  Compared to ECG 04/22/2019 02:51:40  Myocardial infarct finding now present    Electronically Signed On 4- 17:32:46 PDT by ZENOBIA SO MD       RADIOLOGY  No orders to display   The radiologist's interpretation of all radiological studies have been reviewed by me.    COURSE & MEDICAL DECISION MAKING  Pertinent Labs & Imaging studies reviewed. (See chart for details)    I reviewed the patient's medical records which showed she was seen this morning two previous times for alcohol intoxication. Patient was seen in the ED on the 27th with similar symptoms. She was seen in the 25th for possible overdose. Today she was monitored until approximately 7AM then discharged. She returned approximately 1 hour later and left AMA at approximately 12PM. She hs a recent overdose of benzos for which she was admitted. She was intubated on the 22nd with respiratory failure.     3:57 PM - Patient seen and examined at bedside. Patient will be treated with NS 1000 ml for drug overdose. Ordered Blod alcohol, CBC with differential, CMP, Lipase, Acetaminophen, Salicylate, Urine drug screen, POC breathalyzer to evaluate her symptoms. The differential diagnoses include but are not limited to: drug overdose, suicidal ideation, suicide attempt, serotonin syndrome, hypotension secondary to lisinopril overdose        Decision Making:  Patient with alcohol intoxication, drug overdose, lisinopril, Prozac.  Discussed the case with poison control who recommends admission to the hospital lisinopril dose, patient is intoxicated here, discussed the case with hospitalist for admission the hospital.    DISPOSITION:  Patient will be admitted to hospital in guarded condition.    FINAL IMPRESSION  1. Suicidal ideation    2. Intentional drug overdose, initial encounter ([facility])    3. Alcohol  intoxication with delirium (HCC)          I, Nav Tavares (Scribe), am scribing for, and in the presence of, Eduardo Eid M.D..    Electronically signed by: Nav Tavares (Scribe), 4/29/2019    IEduardo M.D. personally performed the services described in this documentation, as scribed by Nav Tavares in my presence, and it is both accurate and complete.    The note accurately reflects work and decisions made by me.  Eduardo Eid  4/29/2019  6:44 PM

## 2019-04-29 NOTE — ED TRIAGE NOTES
Ashleigh Marquis  58 y.o.  Chief Complaint   Patient presents with   • Alcohol Problem     found passed out drunk at 7/11, patient has no complaints     Pt BIB EMS from 7/11 parking lot. Pt PMH heavy alcohol abuse.  Nearly empty vodka bottle in purse.  Patient is somnolent, arousable to voice.  FSBG 104.  Patient denies any medical complaints. Denies SI/HI.  Patient has been at this hospital nearly every day for the last 2 weeks.  Pt denies pain at this time.    Chart up for ERP.

## 2019-04-29 NOTE — ED NOTES
Pt called first facility, pt told to call director per pt.  Pt declines to call director, ambulating to bathroom.

## 2019-04-29 NOTE — DISCHARGE PLANNING
"Alert Team  Provided pt with resources for alcohol use tx.  Discussed the options available with [employer] insurance  Pt denies SI, HI or hallucinations.  She expressed being \"done with alcohol.  I've got to quit.\"  Encouraged her to f/u immediately after DC with [facility].  Advised her to call the facilities with inpt detox that accept FFS to inquire about wait lists, admission process, etc.  "

## 2019-04-29 NOTE — ED TRIAGE NOTES
"Pt BIB [facility] with c/c of alcohol intoxication. Pt called EMS from the [facility] bus stop where she states she took a \"handful\" of lisinopril and prozac. Pt with 2 lisinopril bottles filled on 4/24 with a quantity of 180. Pt with 59 left. Pt also had klonopin filled on 4/15 with quantify of 30 and they are all gone. When asked why she took the pills pt stating \"I dont want to talk about it\" Pt complaining of leg pain. R leg is slightly larger then left and warm to touch.   "

## 2019-04-29 NOTE — ED NOTES
Pt provided with additional blankets at this time.  Will continue to monitor.  Pending eval by MD.

## 2019-04-30 LAB
ALBUMIN SERPL BCP-MCNC: 3.7 G/DL (ref 3.2–4.9)
ALBUMIN/GLOB SERPL: 1.5 G/DL
ALP SERPL-CCNC: 132 U/L (ref 30–99)
ALT SERPL-CCNC: 16 U/L (ref 2–50)
ANION GAP SERPL CALC-SCNC: 11 MMOL/L (ref 0–11.9)
AST SERPL-CCNC: 28 U/L (ref 12–45)
BASOPHILS # BLD AUTO: 2.1 % (ref 0–1.8)
BASOPHILS # BLD: 0.12 K/UL (ref 0–0.12)
BILIRUB SERPL-MCNC: 0.4 MG/DL (ref 0.1–1.5)
BUN SERPL-MCNC: 8 MG/DL (ref 8–22)
CALCIUM SERPL-MCNC: 8.8 MG/DL (ref 8.5–10.5)
CHLORIDE SERPL-SCNC: 105 MMOL/L (ref 96–112)
CO2 SERPL-SCNC: 24 MMOL/L (ref 20–33)
CREAT SERPL-MCNC: 0.64 MG/DL (ref 0.5–1.4)
EOSINOPHIL # BLD AUTO: 0.08 K/UL (ref 0–0.51)
EOSINOPHIL NFR BLD: 1.4 % (ref 0–6.9)
ERYTHROCYTE [DISTWIDTH] IN BLOOD BY AUTOMATED COUNT: 55.6 FL (ref 35.9–50)
GLOBULIN SER CALC-MCNC: 2.5 G/DL (ref 1.9–3.5)
GLUCOSE BLD-MCNC: 91 MG/DL (ref 65–99)
GLUCOSE BLD-MCNC: 95 MG/DL (ref 65–99)
GLUCOSE SERPL-MCNC: 118 MG/DL (ref 65–99)
HCT VFR BLD AUTO: 33.2 % (ref 37–47)
HGB BLD-MCNC: 10.5 G/DL (ref 12–16)
IMM GRANULOCYTES # BLD AUTO: 0.02 K/UL (ref 0–0.11)
IMM GRANULOCYTES NFR BLD AUTO: 0.3 % (ref 0–0.9)
LYMPHOCYTES # BLD AUTO: 1.88 K/UL (ref 1–4.8)
LYMPHOCYTES NFR BLD: 32.8 % (ref 22–41)
MAGNESIUM SERPL-MCNC: 1.6 MG/DL (ref 1.5–2.5)
MCH RBC QN AUTO: 30.9 PG (ref 27–33)
MCHC RBC AUTO-ENTMCNC: 31.6 G/DL (ref 33.6–35)
MCV RBC AUTO: 97.6 FL (ref 81.4–97.8)
MONOCYTES # BLD AUTO: 0.66 K/UL (ref 0–0.85)
MONOCYTES NFR BLD AUTO: 11.5 % (ref 0–13.4)
NEUTROPHILS # BLD AUTO: 2.97 K/UL (ref 2–7.15)
NEUTROPHILS NFR BLD: 51.9 % (ref 44–72)
NRBC # BLD AUTO: 0 K/UL
NRBC BLD-RTO: 0 /100 WBC
PHOSPHATE SERPL-MCNC: 2.7 MG/DL (ref 2.5–4.5)
PLATELET # BLD AUTO: 563 K/UL (ref 164–446)
PMV BLD AUTO: 9.6 FL (ref 9–12.9)
POTASSIUM SERPL-SCNC: 3.4 MMOL/L (ref 3.6–5.5)
PROT SERPL-MCNC: 6.2 G/DL (ref 6–8.2)
RBC # BLD AUTO: 3.4 M/UL (ref 4.2–5.4)
SODIUM SERPL-SCNC: 140 MMOL/L (ref 135–145)
WBC # BLD AUTO: 5.7 K/UL (ref 4.8–10.8)

## 2019-04-30 PROCEDURE — 700102 HCHG RX REV CODE 250 W/ 637 OVERRIDE(OP): Performed by: HOSPITALIST

## 2019-04-30 PROCEDURE — A9270 NON-COVERED ITEM OR SERVICE: HCPCS | Performed by: HOSPITALIST

## 2019-04-30 PROCEDURE — 80053 COMPREHEN METABOLIC PANEL: CPT

## 2019-04-30 PROCEDURE — 700105 HCHG RX REV CODE 258: Performed by: HOSPITALIST

## 2019-04-30 PROCEDURE — 99226 PR SUBSEQUENT OBSERVATION CARE,LEVEL III: CPT | Performed by: HOSPITALIST

## 2019-04-30 PROCEDURE — HZ2ZZZZ DETOXIFICATION SERVICES FOR SUBSTANCE ABUSE TREATMENT: ICD-10-PCS | Performed by: INTERNAL MEDICINE

## 2019-04-30 PROCEDURE — 99214 OFFICE O/P EST MOD 30 MIN: CPT | Performed by: PSYCHIATRY & NEUROLOGY

## 2019-04-30 PROCEDURE — G0378 HOSPITAL OBSERVATION PER HR: HCPCS

## 2019-04-30 PROCEDURE — 85025 COMPLETE CBC W/AUTO DIFF WBC: CPT

## 2019-04-30 PROCEDURE — 700111 HCHG RX REV CODE 636 W/ 250 OVERRIDE (IP): Performed by: HOSPITALIST

## 2019-04-30 PROCEDURE — 82962 GLUCOSE BLOOD TEST: CPT | Mod: 91

## 2019-04-30 PROCEDURE — 83735 ASSAY OF MAGNESIUM: CPT

## 2019-04-30 PROCEDURE — 36415 COLL VENOUS BLD VENIPUNCTURE: CPT

## 2019-04-30 PROCEDURE — 96372 THER/PROPH/DIAG INJ SC/IM: CPT

## 2019-04-30 PROCEDURE — 96375 TX/PRO/DX INJ NEW DRUG ADDON: CPT

## 2019-04-30 PROCEDURE — 84100 ASSAY OF PHOSPHORUS: CPT

## 2019-04-30 RX ORDER — LISINOPRIL 5 MG/1
10 TABLET ORAL DAILY
Status: DISCONTINUED | OUTPATIENT
Start: 2019-04-30 | End: 2019-05-02

## 2019-04-30 RX ORDER — POTASSIUM CHLORIDE 20 MEQ/1
20 TABLET, EXTENDED RELEASE ORAL ONCE
Status: COMPLETED | OUTPATIENT
Start: 2019-04-30 | End: 2019-04-30

## 2019-04-30 RX ADMIN — HEPARIN SODIUM 5000 UNITS: 5000 INJECTION, SOLUTION INTRAVENOUS; SUBCUTANEOUS at 05:52

## 2019-04-30 RX ADMIN — LORAZEPAM 3 MG: 2 TABLET ORAL at 15:03

## 2019-04-30 RX ADMIN — HEPARIN SODIUM 5000 UNITS: 5000 INJECTION, SOLUTION INTRAVENOUS; SUBCUTANEOUS at 00:30

## 2019-04-30 RX ADMIN — THERA TABS 1 TABLET: TAB at 05:52

## 2019-04-30 RX ADMIN — DEXTROSE AND SODIUM CHLORIDE: 5; 900 INJECTION, SOLUTION INTRAVENOUS at 03:54

## 2019-04-30 RX ADMIN — MAGNESIUM OXIDE TAB 400 MG (241.3 MG ELEMENTAL MG) 400 MG: 400 (241.3 MG) TAB at 09:03

## 2019-04-30 RX ADMIN — SENNOSIDES AND DOCUSATE SODIUM 2 TABLET: 8.6; 5 TABLET ORAL at 00:30

## 2019-04-30 RX ADMIN — LORAZEPAM 4 MG: 2 TABLET ORAL at 20:42

## 2019-04-30 RX ADMIN — HEPARIN SODIUM 5000 UNITS: 5000 INJECTION, SOLUTION INTRAVENOUS; SUBCUTANEOUS at 20:38

## 2019-04-30 RX ADMIN — LORAZEPAM 0.5 MG: 2 INJECTION INTRAMUSCULAR; INTRAVENOUS at 05:51

## 2019-04-30 RX ADMIN — FOLIC ACID 1 MG: 1 TABLET ORAL at 05:52

## 2019-04-30 RX ADMIN — LORAZEPAM 2 MG: 2 TABLET ORAL at 22:34

## 2019-04-30 RX ADMIN — HEPARIN SODIUM 5000 UNITS: 5000 INJECTION, SOLUTION INTRAVENOUS; SUBCUTANEOUS at 14:39

## 2019-04-30 RX ADMIN — POTASSIUM CHLORIDE 20 MEQ: 1500 TABLET, EXTENDED RELEASE ORAL at 09:04

## 2019-04-30 RX ADMIN — DEXTROSE AND SODIUM CHLORIDE: 5; 900 INJECTION, SOLUTION INTRAVENOUS at 11:30

## 2019-04-30 RX ADMIN — LORAZEPAM 3 MG: 2 TABLET ORAL at 09:04

## 2019-04-30 RX ADMIN — Medication 100 MG: at 05:52

## 2019-04-30 RX ADMIN — LISINOPRIL 10 MG: 5 TABLET ORAL at 19:44

## 2019-04-30 RX ADMIN — LORAZEPAM 3 MG: 2 TABLET ORAL at 21:02

## 2019-04-30 RX ADMIN — DEXTROSE AND SODIUM CHLORIDE: 5; 900 INJECTION, SOLUTION INTRAVENOUS at 19:44

## 2019-04-30 ASSESSMENT — LIFESTYLE VARIABLES
PAROXYSMAL SWEATS: BEADS OF SWEAT OBVIOUS ON FOREHEAD
NAUSEA AND VOMITING: *
EVER HAD A DRINK FIRST THING IN THE MORNING TO STEADY YOUR NERVES TO GET RID OF A HANGOVER: YES
DOES PATIENT WANT TO STOP DRINKING: CANNOT ASSESS
TOTAL SCORE: 4
TOTAL SCORE: 4
HAVE YOU EVER FELT YOU SHOULD CUT DOWN ON YOUR DRINKING: YES
AUDITORY DISTURBANCES: MILD HARSHNESS OR ABILITY TO FRIGHTEN
VISUAL DISTURBANCES: NOT PRESENT
NAUSEA AND VOMITING: MILD NAUSEA WITH NO VOMITING
ORIENTATION AND CLOUDING OF SENSORIUM: CANNOT DO SERIAL ADDITIONS OR IS UNCERTAIN ABOUT DATE
AUDITORY DISTURBANCES: NOT PRESENT
ORIENTATION AND CLOUDING OF SENSORIUM: ORIENTED AND CAN DO SERIAL ADDITIONS
ORIENTATION AND CLOUDING OF SENSORIUM: ORIENTED AND CAN DO SERIAL ADDITIONS
TOTAL SCORE: MODERATE ITCHING, PINS AND NEEDLES SENSATION, BURNING OR NUMBNESS
AGITATION: NORMAL ACTIVITY
ALCOHOL_USE: YES
PAROXYSMAL SWEATS: BEADS OF SWEAT OBVIOUS ON FOREHEAD
VISUAL DISTURBANCES: MILD SENSITIVITY
VISUAL DISTURBANCES: MODERATE SENSITIVITY
AVERAGE NUMBER OF DAYS PER WEEK YOU HAVE A DRINK CONTAINING ALCOHOL: 7
TREMOR: MODERATE TREMOR WITH ARMS EXTENDED
NAUSEA AND VOMITING: MILD NAUSEA WITH NO VOMITING
ORIENTATION AND CLOUDING OF SENSORIUM: ORIENTED AND CAN DO SERIAL ADDITIONS
EVER FELT BAD OR GUILTY ABOUT YOUR DRINKING: YES
ON A TYPICAL DAY WHEN YOU DRINK ALCOHOL HOW MANY DRINKS DO YOU HAVE: 8
TOTAL SCORE: 4
AGITATION: *
AGITATION: NORMAL ACTIVITY
TOTAL SCORE: 28
TOTAL SCORE: MILD ITCHING, PINS AND NEEDLES SENSATION, BURNING OR NUMBNESS
CONSUMPTION TOTAL: POSITIVE
VISUAL DISTURBANCES: VERY MILD SENSITIVITY
HAVE YOU EVER FELT YOU SHOULD CUT DOWN ON YOUR DRINKING: YES
AVERAGE NUMBER OF DAYS PER WEEK YOU HAVE A DRINK CONTAINING ALCOHOL: 7
NAUSEA AND VOMITING: MILD NAUSEA WITH NO VOMITING
AGITATION: *
ORIENTATION AND CLOUDING OF SENSORIUM: ORIENTED AND CAN DO SERIAL ADDITIONS
TREMOR: NO TREMOR
HEADACHE, FULLNESS IN HEAD: NOT PRESENT
EVER FELT BAD OR GUILTY ABOUT YOUR DRINKING: YES
ORIENTATION AND CLOUDING OF SENSORIUM: ORIENTED AND CAN DO SERIAL ADDITIONS
PAROXYSMAL SWEATS: *
AUDITORY DISTURBANCES: MODERATE HARSHNESS OR ABILITY TO FRIGHTEN
ANXIETY: MODERATELY ANXIOUS OR GUARDED, SO ANXIETY IS INFERRED
TOTAL SCORE: MILD ITCHING, PINS AND NEEDLES SENSATION, BURNING OR NUMBNESS
ORIENTATION AND CLOUDING OF SENSORIUM: ORIENTED AND CAN DO SERIAL ADDITIONS
HOW MANY TIMES IN THE PAST YEAR HAVE YOU HAD 5 OR MORE DRINKS IN A DAY: 8
HEADACHE, FULLNESS IN HEAD: MILD
AGITATION: SOMEWHAT MORE THAN NORMAL ACTIVITY
CONSUMPTION TOTAL: INCOMPLETE
ANXIETY: NO ANXIETY (AT EASE)
HEADACHE, FULLNESS IN HEAD: MODERATELY SEVERE
PAROXYSMAL SWEATS: *
AGITATION: NORMAL ACTIVITY
HAVE PEOPLE ANNOYED YOU BY CRITICIZING YOUR DRINKING: YES
AUDITORY DISTURBANCES: NOT PRESENT
ORIENTATION AND CLOUDING OF SENSORIUM: ORIENTED AND CAN DO SERIAL ADDITIONS
ANXIETY: NO ANXIETY (AT EASE)
TOTAL SCORE: 4
NAUSEA AND VOMITING: MILD NAUSEA WITH NO VOMITING
AGITATION: SOMEWHAT MORE THAN NORMAL ACTIVITY
TOTAL SCORE: 2
TOTAL SCORE: 10
ALCOHOL_USE: YES
HAVE PEOPLE ANNOYED YOU BY CRITICIZING YOUR DRINKING: YES
PAROXYSMAL SWEATS: NO SWEAT VISIBLE
NAUSEA AND VOMITING: NO NAUSEA AND NO VOMITING
TOTAL SCORE: 1
TREMOR: NO TREMOR
AGITATION: NORMAL ACTIVITY
HEADACHE, FULLNESS IN HEAD: MODERATELY SEVERE
VISUAL DISTURBANCES: NOT PRESENT
HEADACHE, FULLNESS IN HEAD: VERY MILD
HEADACHE, FULLNESS IN HEAD: MODERATE
ANXIETY: *
PAROXYSMAL SWEATS: BARELY PERCEPTIBLE SWEATING. PALMS MOIST
TOTAL SCORE: 11
ANXIETY: MILDLY ANXIOUS
VISUAL DISTURBANCES: NOT PRESENT
TREMOR: MODERATE TREMOR WITH ARMS EXTENDED
ANXIETY: MILDLY ANXIOUS
TOTAL SCORE: 4
TOTAL SCORE: 15
TOTAL SCORE: 4
TREMOR: *
HEADACHE, FULLNESS IN HEAD: VERY MILD
TREMOR: MODERATE TREMOR WITH ARMS EXTENDED
VISUAL DISTURBANCES: NOT PRESENT
TREMOR: *
AUDITORY DISTURBANCES: NOT PRESENT
TREMOR: *
ANXIETY: MODERATELY ANXIOUS OR GUARDED, SO ANXIETY IS INFERRED
TOTAL SCORE: MILD ITCHING, PINS AND NEEDLES SENSATION, BURNING OR NUMBNESS
EVER HAD A DRINK FIRST THING IN THE MORNING TO STEADY YOUR NERVES TO GET RID OF A HANGOVER: YES
ANXIETY: *
TOTAL SCORE: 15
HEADACHE, FULLNESS IN HEAD: MODERATE
AUDITORY DISTURBANCES: NOT PRESENT
NAUSEA AND VOMITING: MILD NAUSEA WITH NO VOMITING
PAROXYSMAL SWEATS: BEADS OF SWEAT OBVIOUS ON FOREHEAD
NAUSEA AND VOMITING: NO NAUSEA AND NO VOMITING
PAROXYSMAL SWEATS: BARELY PERCEPTIBLE SWEATING. PALMS MOIST
VISUAL DISTURBANCES: NOT PRESENT
TOTAL SCORE: 23

## 2019-04-30 ASSESSMENT — ENCOUNTER SYMPTOMS
HEMOPTYSIS: 0
CHILLS: 0
FEVER: 0
HEARTBURN: 0
WEAKNESS: 1
BLURRED VISION: 0
DOUBLE VISION: 0
PALPITATIONS: 0
NECK PAIN: 0
DIZZINESS: 0
TREMORS: 1
MYALGIAS: 0
NAUSEA: 0
COUGH: 0
BRUISES/BLEEDS EASILY: 0
HEADACHES: 0

## 2019-04-30 ASSESSMENT — PATIENT HEALTH QUESTIONNAIRE - PHQ9
SUM OF ALL RESPONSES TO PHQ9 QUESTIONS 1 AND 2: 1
2. FEELING DOWN, DEPRESSED, IRRITABLE, OR HOPELESS: SEVERAL DAYS
1. LITTLE INTEREST OR PLEASURE IN DOING THINGS: NOT AT ALL

## 2019-04-30 ASSESSMENT — COGNITIVE AND FUNCTIONAL STATUS - GENERAL
CLIMB 3 TO 5 STEPS WITH RAILING: A LOT
SUGGESTED CMS G CODE MODIFIER MOBILITY: CJ
SUGGESTED CMS G CODE MODIFIER DAILY ACTIVITY: CH
DAILY ACTIVITIY SCORE: 24
MOBILITY SCORE: 22
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
## 2019-04-30 NOTE — CARE PLAN
Patient family notified of procedure start time via patient liaison.   Problem: Safety  Goal: Will remain free from falls  Outcome: PROGRESSING AS EXPECTED  Patient will remain free from falls during shift.    Problem: Skin Integrity  Goal: Risk for impaired skin integrity will decrease  Outcome: PROGRESSING AS EXPECTED  Patient will not have skin breakdown during shift.    Problem: Psychosocial Needs:  Goal: Level of anxiety will decrease  Outcome: PROGRESSING AS EXPECTED  Patient will remain calm during shift.

## 2019-04-30 NOTE — PROGRESS NOTES
· 2 RN skin check complete with TETO Neely.  · Devices in place N/A.  · Skin assessed under devices N/A.  · Confirmed pressure ulcers found on N/A.  · New potential pressure ulcers noted on N/A.   · The following interventions in place N/A.    Patient's bilateral ears are red and blanching.  Patient's bilateral upper extremities have scattered bruising and scabbing.  Patient's bilateral elbows are red and blanching with scabbing on left elbow.  Patient's sacrum is red and blanching with an abrasion/scab in intragluteal fold.  Patient's bilateral lower extremities have scattered bruising and scabbing.  Patient's back right thigh/groin area has what appears to be bug bite/scab/abrasion.  Patient's bilateral feet are dry, calloused, and heels are red and blanching.

## 2019-04-30 NOTE — CONSULTS
Alert Team  Pt is now slated for inpatient admission to medical bed.  She will not be seen by ER's Alert Team.  She will need an IP Consult to Psychiatry for her c/o SI/SA.

## 2019-04-30 NOTE — ASSESSMENT & PLAN NOTE
This patient presents with SI and intential drug overdose of prozac and lisinopril   IP psych consultation in the am   Legal hold  [facility]  Poison control case # [id number].  Ms. Marquis was here with a confirmed overdose of Unknown substance or drug; she has no other known overdoses.    Resolved.   Legal hold extended for her behavioral issues  Currently she seems cooperative and has no complaints.  On 5/10 Legal hold lifted  Avoid prescription medications

## 2019-04-30 NOTE — H&P
Hospital Medicine History & Physical Note    Date of Service  4/29/2019    Primary Care Physician  Pcp Pt States None    Consultants  Poison control    Code Status  full    Chief Complaint  Drug overdose     History of Presenting Illness  58 y.o. female who presented 4/29/2019 with past medical history of alcohol abuse bipolar 2 disorder major depression PTSD and history of suicide attempt who presents with drug ingestion.  This patient has been in the emergency department 3 times in the last 24 hours.  She apparently had suicidal ideations and had a drug overdose.  She states that she took her antidepressants and antihypertensives and a handful of unknown amount.  She is on Prozac and lisinopril regularly.  She is currently altered as she is intoxicated with alcohol.  Poison control has been consulted and the patient will be admitted to the hospital for further observation.    Review of Systems  Review of Systems   Unable to perform ROS: Mental status change       Past Medical History   has a past medical history of Alcoholism (HCC); Anxiety; Arthritis; ASTHMA; Cancer ([facility]) (1981); Chronic low back pain; Congestive heart failure ([facility]); Depression; EtOH dependence ([facility]); Fall; GERD (gastroesophageal reflux disease); HTN; Hypertension; Indigestion; Muscle disorder; OSTEOPOROSIS; Other specified symptom associated with female genital organs; Psychiatric disorder; PTSD (post-traumatic stress disorder); Renal disorder; Seizure ([facility]); Ulcer; and Vitamin D deficiency. She also has no past medical history of Heart murmur.    Surgical History   has a past surgical history that includes hernia repair (1977); cysto stent placemnt pre surg (10/7/2010); cystoscopy stent placement (11/9/2010); ureteroscopy (11/9/2010); lasertripsy (11/9/2010); stent removal (11/9/2010); and gastroscopy-endo (N/A, 10/3/2018).     Family History  family history includes Cancer in her paternal grandmother; Depression in her other; Diabetes in her  father; Heart Disease in her father; Hypertension in her father.     Social History   reports that she has been smoking Cigarettes.  She has a 10.00 pack-year smoking history. She has never used smokeless tobacco. She reports that she drinks alcohol. She reports that she does not use drugs.    Allergies  Allergies   Allergen Reactions   • Sulfa Drugs Vomiting   • Toradol Vomiting   • Gabapentin      Bowel incontinence     • Amoxicillin Rash     Reported by NAIDA on arrival to ED    Pt states she takes PCN 10/3       Medications  Prior to Admission Medications   Prescriptions Last Dose Informant Patient Reported? Taking?   carBAMazepine (TEGRETOL) 200 MG Tab 4/29/2019 at 1000  Yes No   Sig: Take 200 mg by mouth 2 Times a Day.   clonazePAM (KLONOPIN) 1 MG Tab 4/29/2019 at 1000  Yes Yes   Sig: Take 0.5 mg by mouth 3 times a day as needed.   fluoxetine (PROZAC) 40 MG capsule 4/29/2019 at 1000 Patient Yes No   Sig: Take 40 mg by mouth every day.   lisinopril (PRINIVIL) 10 MG Tab 4/29/2019 at 1000  No No   Sig: Take 1 Tab by mouth every day.   omeprazole (PRILOSEC) 20 MG delayed-release capsule 4/29/2019 at 1000  No No   Sig: Take 1 Cap by mouth every day.      Facility-Administered Medications: None       Physical Exam  Temp:  [36 °C (96.8 °F)-36.6 °C (97.9 °F)] 36.4 °C (97.5 °F)  Pulse:  [] 96  Resp:  [14-19] 18  BP: (115-141)/(40-95) 115/40  SpO2:  [95 %-98 %] 95 %    Physical Exam   Constitutional: She is oriented to person, place, and time. She appears well-developed and well-nourished. No distress.   HENT:   Head: Normocephalic and atraumatic.   Eyes: Pupils are equal, round, and reactive to light. Conjunctivae and EOM are normal.   Neck: Normal range of motion. Neck supple. No JVD present.   Cardiovascular: Regular rhythm, normal heart sounds and intact distal pulses.    No murmur heard.  Tachycardic    Pulmonary/Chest: Effort normal and breath sounds normal. No respiratory distress. She has no wheezes.    Abdominal: Soft. Bowel sounds are normal. She exhibits no distension. There is no tenderness.   Musculoskeletal: Normal range of motion. She exhibits no edema.   Neurological: She is alert and oriented to person, place, and time. She exhibits normal muscle tone.   Skin: Skin is warm and dry.   Psychiatric:   intoxicated   Nursing note and vitals reviewed.      Laboratory:  Recent Labs      04/29/19   1747   WBC  8.0   RBC  3.68*   HEMOGLOBIN  11.3*   HEMATOCRIT  35.8*   MCV  97.3   MCH  30.7   MCHC  31.6*   RDW  55.5*   PLATELETCT  619*   MPV  9.3         Recent Labs      04/29/19   1747   ALTSGPT  19   ASTSGOT  32   ALKPHOSPHAT  141*   TBILIRUBIN  0.4   LIPASE  39   GLUCOSE  59*                 No results for input(s): TROPONINI in the last 72 hours.    Urinalysis:    No results found     Imaging:  No orders to display         Assessment/Plan:  I anticipate this patient is appropriate for observation status at this time.    * Drug overdose, intentional (HCC)   Assessment & Plan    This patient presents with SI and intential drug overdose of prozac and lisinopril   Poison control consulted   Cardiac monitroing   Monitor for evidence of hypotension and seritonin syndrome  IP psych consultation in the    Legal hold  [facility]  Poison control case # [id number].  Ms. Marquis was here with a confirmed overdose of Unknown substance or drug; she has no other known overdoses.         Bipolar 2 disorder (HCC)- (present on admission)   Assessment & Plan    History of, IP psych consultation ordered     PTSD (post-traumatic stress disorder)- (present on admission)   Assessment & Plan    History of ip psych consultation     Suicidal ideation   Assessment & Plan    [facility]  [facility] consult     Anemia   Assessment & Plan    No evidence of blood loss  Lab workup ordered     Hypoglycemia   Assessment & Plan    Hypoglycemia protocol ordered  D5NS IVF   accu checks until improved     Alcohol intoxication (HCC)   Assessment &  Plan    Intoxicated   Will need rally bag   CIWA ordered high risk for withdrawals          VTE prophylaxis: heparin

## 2019-04-30 NOTE — ED NOTES
Med rec updated and complete.  Allergies reviewed.  Met with pt at bedside. Pt reports taking all her morning doses.  Pt denies oral antibiotic use in last 30 days.

## 2019-04-30 NOTE — ED NOTES
Bedside report given to TETO Gilman. Pt to be transferred to [id number] with RN on monitor in stable condition.

## 2019-04-30 NOTE — THERAPY
PT eval attempted after discussion with RN; pt reporting she has difficulty with stairs. Pt refused eval due to tremulousness. Will re-attempt as able.

## 2019-04-30 NOTE — PROGRESS NOTES
Received report from previous nurse regarding previous time in ED.  Patient attached to zoll and transported to Telemetry 8.  Patient then attached to heart monitor and monitor room confirmed Sinus Rhythm.  POC reviewed with pt, white board updated, pt verbalized understanding, call light within reach.  Pt encouraged to call before getting up.  Bed in lowest position, treaded slippers on.

## 2019-04-30 NOTE — ED NOTES
Assumed care of pt at this time. Pt resting in bed, denies needs. ABCs intact. NAD noted at this time.

## 2019-04-30 NOTE — THERAPY
OT referral received. Per RN, pt is mobilizing to/from bathroom with steady gait and no assist. Completing basic ADL without assist. No OT needs at this time. Eval deferred.

## 2019-05-01 LAB
ALBUMIN SERPL BCP-MCNC: 3.1 G/DL (ref 3.2–4.9)
ALBUMIN/GLOB SERPL: 1.2 G/DL
ALP SERPL-CCNC: 133 U/L (ref 30–99)
ALT SERPL-CCNC: 15 U/L (ref 2–50)
ANION GAP SERPL CALC-SCNC: 8 MMOL/L (ref 0–11.9)
AST SERPL-CCNC: 29 U/L (ref 12–45)
BASOPHILS # BLD AUTO: 1.4 % (ref 0–1.8)
BASOPHILS # BLD: 0.08 K/UL (ref 0–0.12)
BILIRUB SERPL-MCNC: 0.5 MG/DL (ref 0.1–1.5)
BUN SERPL-MCNC: 6 MG/DL (ref 8–22)
CALCIUM SERPL-MCNC: 8.6 MG/DL (ref 8.5–10.5)
CHLORIDE SERPL-SCNC: 106 MMOL/L (ref 96–112)
CO2 SERPL-SCNC: 26 MMOL/L (ref 20–33)
CREAT SERPL-MCNC: 0.56 MG/DL (ref 0.5–1.4)
EOSINOPHIL # BLD AUTO: 0.11 K/UL (ref 0–0.51)
EOSINOPHIL NFR BLD: 2 % (ref 0–6.9)
ERYTHROCYTE [DISTWIDTH] IN BLOOD BY AUTOMATED COUNT: 53.8 FL (ref 35.9–50)
GLOBULIN SER CALC-MCNC: 2.6 G/DL (ref 1.9–3.5)
GLUCOSE BLD-MCNC: 100 MG/DL (ref 65–99)
GLUCOSE BLD-MCNC: 94 MG/DL (ref 65–99)
GLUCOSE SERPL-MCNC: 94 MG/DL (ref 65–99)
HCT VFR BLD AUTO: 34.7 % (ref 37–47)
HGB BLD-MCNC: 11.2 G/DL (ref 12–16)
IMM GRANULOCYTES # BLD AUTO: 0 K/UL (ref 0–0.11)
IMM GRANULOCYTES NFR BLD AUTO: 0 % (ref 0–0.9)
LYMPHOCYTES # BLD AUTO: 1.89 K/UL (ref 1–4.8)
LYMPHOCYTES NFR BLD: 34.1 % (ref 22–41)
MAGNESIUM SERPL-MCNC: 1.6 MG/DL (ref 1.5–2.5)
MCH RBC QN AUTO: 31.2 PG (ref 27–33)
MCHC RBC AUTO-ENTMCNC: 32.3 G/DL (ref 33.6–35)
MCV RBC AUTO: 96.7 FL (ref 81.4–97.8)
MONOCYTES # BLD AUTO: 0.51 K/UL (ref 0–0.85)
MONOCYTES NFR BLD AUTO: 9.2 % (ref 0–13.4)
NEUTROPHILS # BLD AUTO: 2.96 K/UL (ref 2–7.15)
NEUTROPHILS NFR BLD: 53.3 % (ref 44–72)
NRBC # BLD AUTO: 0 K/UL
NRBC BLD-RTO: 0 /100 WBC
PHOSPHATE SERPL-MCNC: 3.2 MG/DL (ref 2.5–4.5)
PLATELET # BLD AUTO: 487 K/UL (ref 164–446)
PMV BLD AUTO: 9.5 FL (ref 9–12.9)
POTASSIUM SERPL-SCNC: 3.2 MMOL/L (ref 3.6–5.5)
PROT SERPL-MCNC: 5.7 G/DL (ref 6–8.2)
RBC # BLD AUTO: 3.59 M/UL (ref 4.2–5.4)
SODIUM SERPL-SCNC: 140 MMOL/L (ref 135–145)
WBC # BLD AUTO: 5.6 K/UL (ref 4.8–10.8)

## 2019-05-01 PROCEDURE — 99232 SBSQ HOSP IP/OBS MODERATE 35: CPT | Performed by: HOSPITALIST

## 2019-05-01 PROCEDURE — 85025 COMPLETE CBC W/AUTO DIFF WBC: CPT

## 2019-05-01 PROCEDURE — 80053 COMPREHEN METABOLIC PANEL: CPT

## 2019-05-01 PROCEDURE — 36415 COLL VENOUS BLD VENIPUNCTURE: CPT

## 2019-05-01 PROCEDURE — 700102 HCHG RX REV CODE 250 W/ 637 OVERRIDE(OP): Performed by: HOSPITALIST

## 2019-05-01 PROCEDURE — 770020 HCHG ROOM/CARE - TELE (206)

## 2019-05-01 PROCEDURE — 84100 ASSAY OF PHOSPHORUS: CPT

## 2019-05-01 PROCEDURE — A9270 NON-COVERED ITEM OR SERVICE: HCPCS | Performed by: HOSPITALIST

## 2019-05-01 PROCEDURE — 83735 ASSAY OF MAGNESIUM: CPT

## 2019-05-01 PROCEDURE — 96372 THER/PROPH/DIAG INJ SC/IM: CPT

## 2019-05-01 PROCEDURE — 700105 HCHG RX REV CODE 258: Performed by: HOSPITALIST

## 2019-05-01 PROCEDURE — 97161 PT EVAL LOW COMPLEX 20 MIN: CPT

## 2019-05-01 PROCEDURE — 82962 GLUCOSE BLOOD TEST: CPT

## 2019-05-01 PROCEDURE — 700111 HCHG RX REV CODE 636 W/ 250 OVERRIDE (IP): Performed by: HOSPITALIST

## 2019-05-01 RX ORDER — IBUPROFEN 400 MG/1
400 TABLET ORAL EVERY 6 HOURS PRN
Status: DISCONTINUED | OUTPATIENT
Start: 2019-05-01 | End: 2019-05-11 | Stop reason: HOSPADM

## 2019-05-01 RX ORDER — LACTOBACILLUS RHAMNOSUS GG 10B CELL
1 CAPSULE ORAL
Status: DISCONTINUED | OUTPATIENT
Start: 2019-05-02 | End: 2019-05-01

## 2019-05-01 RX ORDER — POTASSIUM CHLORIDE 20 MEQ/1
20 TABLET, EXTENDED RELEASE ORAL ONCE
Status: COMPLETED | OUTPATIENT
Start: 2019-05-01 | End: 2019-05-01

## 2019-05-01 RX ORDER — OMEPRAZOLE 20 MG/1
20 CAPSULE, DELAYED RELEASE ORAL DAILY
Status: DISCONTINUED | OUTPATIENT
Start: 2019-05-01 | End: 2019-05-11 | Stop reason: HOSPADM

## 2019-05-01 RX ORDER — LACTOBACILLUS RHAMNOSUS GG 10B CELL
1 CAPSULE ORAL
Status: DISCONTINUED | OUTPATIENT
Start: 2019-05-01 | End: 2019-05-11 | Stop reason: HOSPADM

## 2019-05-01 RX ADMIN — HEPARIN SODIUM 5000 UNITS: 5000 INJECTION, SOLUTION INTRAVENOUS; SUBCUTANEOUS at 04:48

## 2019-05-01 RX ADMIN — ONDANSETRON 4 MG: 4 TABLET, ORALLY DISINTEGRATING ORAL at 14:08

## 2019-05-01 RX ADMIN — LORAZEPAM 2 MG: 2 TABLET ORAL at 19:02

## 2019-05-01 RX ADMIN — MAGNESIUM OXIDE TAB 400 MG (241.3 MG ELEMENTAL MG) 400 MG: 400 (241.3 MG) TAB at 20:23

## 2019-05-01 RX ADMIN — LORAZEPAM 1 MG: 1 TABLET ORAL at 19:02

## 2019-05-01 RX ADMIN — SENNOSIDES AND DOCUSATE SODIUM 2 TABLET: 8.6; 5 TABLET ORAL at 04:48

## 2019-05-01 RX ADMIN — Medication 100 MG: at 04:48

## 2019-05-01 RX ADMIN — POTASSIUM CHLORIDE 20 MEQ: 1500 TABLET, EXTENDED RELEASE ORAL at 10:25

## 2019-05-01 RX ADMIN — LORAZEPAM 2 MG: 2 TABLET ORAL at 10:25

## 2019-05-01 RX ADMIN — HEPARIN SODIUM 5000 UNITS: 5000 INJECTION, SOLUTION INTRAVENOUS; SUBCUTANEOUS at 20:19

## 2019-05-01 RX ADMIN — OMEPRAZOLE 20 MG: 20 CAPSULE, DELAYED RELEASE ORAL at 14:54

## 2019-05-01 RX ADMIN — LORAZEPAM 3 MG: 2 TABLET ORAL at 04:48

## 2019-05-01 RX ADMIN — DEXTROSE AND SODIUM CHLORIDE: 5; 900 INJECTION, SOLUTION INTRAVENOUS at 20:20

## 2019-05-01 RX ADMIN — THERA TABS 1 TABLET: TAB at 04:48

## 2019-05-01 RX ADMIN — FOLIC ACID 1 MG: 1 TABLET ORAL at 04:48

## 2019-05-01 RX ADMIN — IBUPROFEN 400 MG: 800 TABLET, FILM COATED ORAL at 14:54

## 2019-05-01 RX ADMIN — MAGNESIUM OXIDE TAB 400 MG (241.3 MG ELEMENTAL MG) 400 MG: 400 (241.3 MG) TAB at 04:48

## 2019-05-01 RX ADMIN — HEPARIN SODIUM 5000 UNITS: 5000 INJECTION, SOLUTION INTRAVENOUS; SUBCUTANEOUS at 14:01

## 2019-05-01 RX ADMIN — LORAZEPAM 3 MG: 2 TABLET ORAL at 20:19

## 2019-05-01 RX ADMIN — DEXTROSE AND SODIUM CHLORIDE: 5; 900 INJECTION, SOLUTION INTRAVENOUS at 10:25

## 2019-05-01 RX ADMIN — Medication 1 CAPSULE: at 20:19

## 2019-05-01 ASSESSMENT — LIFESTYLE VARIABLES
TOTAL SCORE: VERY MILD ITCHING, PINS AND NEEDLES SENSATION, BURNING OR NUMBNESS
TREMOR: *
ANXIETY: *
TREMOR: *
ANXIETY: *
TOTAL SCORE: 20
HEADACHE, FULLNESS IN HEAD: VERY MILD
VISUAL DISTURBANCES: NOT PRESENT
ANXIETY: NO ANXIETY (AT EASE)
NAUSEA AND VOMITING: NO NAUSEA AND NO VOMITING
VISUAL DISTURBANCES: NOT PRESENT
TOTAL SCORE: MILD ITCHING, PINS AND NEEDLES SENSATION, BURNING OR NUMBNESS
AGITATION: NORMAL ACTIVITY
NAUSEA AND VOMITING: MILD NAUSEA WITH NO VOMITING
ORIENTATION AND CLOUDING OF SENSORIUM: CANNOT DO SERIAL ADDITIONS OR IS UNCERTAIN ABOUT DATE
ORIENTATION AND CLOUDING OF SENSORIUM: ORIENTED AND CAN DO SERIAL ADDITIONS
TREMOR: *
VISUAL DISTURBANCES: VERY MILD SENSITIVITY
ANXIETY: NO ANXIETY (AT EASE)
NAUSEA AND VOMITING: MILD NAUSEA WITH NO VOMITING
AUDITORY DISTURBANCES: VERY MILD HARSHNESS OR ABILITY TO FRIGHTEN
NAUSEA AND VOMITING: NO NAUSEA AND NO VOMITING
TREMOR: *
AUDITORY DISTURBANCES: NOT PRESENT
AGITATION: *
VISUAL DISTURBANCES: MILD SENSITIVITY
PAROXYSMAL SWEATS: BARELY PERCEPTIBLE SWEATING. PALMS MOIST
TREMOR: NO TREMOR
TREMOR: TREMOR NOT VISIBLE BUT CAN BE FELT, FINGERTIP TO FINGERTIP
TREMOR: *
HEADACHE, FULLNESS IN HEAD: NOT PRESENT
AGITATION: NORMAL ACTIVITY
TOTAL SCORE: 4
PAROXYSMAL SWEATS: *
NAUSEA AND VOMITING: NO NAUSEA AND NO VOMITING
VISUAL DISTURBANCES: NOT PRESENT
PAROXYSMAL SWEATS: BARELY PERCEPTIBLE SWEATING. PALMS MOIST
AUDITORY DISTURBANCES: MILD HARSHNESS OR ABILITY TO FRIGHTEN
TOTAL SCORE: 4
NAUSEA AND VOMITING: NO NAUSEA AND NO VOMITING
AGITATION: SOMEWHAT MORE THAN NORMAL ACTIVITY
TOTAL SCORE: 19
AGITATION: SOMEWHAT MORE THAN NORMAL ACTIVITY
ORIENTATION AND CLOUDING OF SENSORIUM: ORIENTED AND CAN DO SERIAL ADDITIONS
AUDITORY DISTURBANCES: NOT PRESENT
ORIENTATION AND CLOUDING OF SENSORIUM: ORIENTED AND CAN DO SERIAL ADDITIONS
TOTAL SCORE: 6
VISUAL DISTURBANCES: NOT PRESENT
TOTAL SCORE: VERY MILD ITCHING, PINS AND NEEDLES SENSATION, BURNING OR NUMBNESS
NAUSEA AND VOMITING: MILD NAUSEA WITH NO VOMITING
PAROXYSMAL SWEATS: *
AGITATION: *
HEADACHE, FULLNESS IN HEAD: MILD
ANXIETY: NO ANXIETY (AT EASE)
AUDITORY DISTURBANCES: MILD HARSHNESS OR ABILITY TO FRIGHTEN
PAROXYSMAL SWEATS: BARELY PERCEPTIBLE SWEATING. PALMS MOIST
ORIENTATION AND CLOUDING OF SENSORIUM: ORIENTED AND CAN DO SERIAL ADDITIONS
VISUAL DISTURBANCES: NOT PRESENT
ORIENTATION AND CLOUDING OF SENSORIUM: ORIENTED AND CAN DO SERIAL ADDITIONS
AGITATION: SOMEWHAT MORE THAN NORMAL ACTIVITY
TOTAL SCORE: MILD ITCHING, PINS AND NEEDLES SENSATION, BURNING OR NUMBNESS
AUDITORY DISTURBANCES: NOT PRESENT
ANXIETY: *
PAROXYSMAL SWEATS: BARELY PERCEPTIBLE SWEATING. PALMS MOIST
HEADACHE, FULLNESS IN HEAD: MODERATE
TREMOR: NO TREMOR
NAUSEA AND VOMITING: NO NAUSEA AND NO VOMITING
AUDITORY DISTURBANCES: NOT PRESENT
TOTAL SCORE: 11
ORIENTATION AND CLOUDING OF SENSORIUM: ORIENTED AND CAN DO SERIAL ADDITIONS
HEADACHE, FULLNESS IN HEAD: MODERATE
VISUAL DISTURBANCES: NOT PRESENT
AUDITORY DISTURBANCES: NOT PRESENT
TOTAL SCORE: MILD ITCHING, PINS AND NEEDLES SENSATION, BURNING OR NUMBNESS
HEADACHE, FULLNESS IN HEAD: NOT PRESENT
AGITATION: NORMAL ACTIVITY
TOTAL SCORE: 4
PAROXYSMAL SWEATS: *
TOTAL SCORE: 15
ANXIETY: NO ANXIETY (AT EASE)
HEADACHE, FULLNESS IN HEAD: MODERATE
HEADACHE, FULLNESS IN HEAD: MODERATE
ORIENTATION AND CLOUDING OF SENSORIUM: ORIENTED AND CAN DO SERIAL ADDITIONS
ANXIETY: *
PAROXYSMAL SWEATS: BARELY PERCEPTIBLE SWEATING. PALMS MOIST
TOTAL SCORE: MILD ITCHING, PINS AND NEEDLES SENSATION, BURNING OR NUMBNESS

## 2019-05-01 ASSESSMENT — ENCOUNTER SYMPTOMS
DIZZINESS: 0
FEVER: 0
MYALGIAS: 0
ORTHOPNEA: 0
HEARTBURN: 0
COUGH: 0
BRUISES/BLEEDS EASILY: 0
TREMORS: 1
HEADACHES: 0
BACK PAIN: 0
CHILLS: 0
NAUSEA: 0
DOUBLE VISION: 0
SPUTUM PRODUCTION: 0
BLURRED VISION: 0
WEAKNESS: 1

## 2019-05-01 ASSESSMENT — GAIT ASSESSMENTS
GAIT LEVEL OF ASSIST: SUPERVISED
DEVIATION: SHUFFLED GAIT
ASSISTIVE DEVICE: OTHER (COMMENTS)
DISTANCE (FEET): 30

## 2019-05-01 ASSESSMENT — COGNITIVE AND FUNCTIONAL STATUS - GENERAL
MOBILITY SCORE: 24
SUGGESTED CMS G CODE MODIFIER MOBILITY: CH

## 2019-05-01 NOTE — DIETARY
Nutrition services: Day 0 of admit. Ashleigh Marquis is a 58 y.o. female with admitting DX of drug overdose.    Consult received for poor PO / wt loss PTA.    Assessment:  Weight: 74.3 kg (163 lb 12.8 oz)  Body mass index is 28.12 kg/m² - overweight  Diet/Intake: Regular (no sharps), PO intake % x2 meals    Evaluation:   1. Pt states that she has had poor oral intake and wt loss 2' drinking alcohol. She states that she has essentially been without much nutrition for the past 2 weeks except for soda and alcohol. Her PO intake has likely been <75% of her estimated nutritional needs for the past month or longer based on our interview. Pt has had several visits to the ED and admissions (?14 visits in April) for alcohol intoxication and suicidal ideation.  2. Labs: K+ 3.2, Mag 1.6, Phos 3.2  3. Pt states that she was 219# 'at her highest' which was a few months ago. Wt records reviewed extensively and trends over the last year have been: 5/23/17 = 76.8kg (standing scale), 10/4/18 = 89.2kg (bed scale), 12/9/18 = 86.4kg (standing scale), 4/30/18 = 74.3kg (standing scale). Pt with 14.2% severe wt loss x 4 months and 3 weeks noted.  4. She states that she is having frequent loose and gassy bowel movements. She is having some gas pain as well. She states that her appetite is fair to poor. She declines changes to her meals right now but would like to have high-protein milkshakes BID for snacks as they are soothing to her.   5. Meds: Culturelle, Mag-ox, Prilosec, Pericolace, Thiamine, MVI, Folic acid, D5 N/S @ 75mL/hr (306kcal/day from dextrose)    Malnutrition Risk: Pt with severe malnutrition in the context of chronic disease related malnutrition (chronic EtOH abuse) as evidenced by severe 14.2% wt loss x4 months and 3 weeks, and PO intake <75% of estimated needs over at >/=1 month.    Recommendations/Plan:  1. Continue regular diet. BID high-protein milkshakes for snacks to maximize nutritional intake.    2. Encourage intake of meals/snacks  3. Document intake of all meals/snacks as % taken in ADL's to provide interdisciplinary communication across all shifts.   4. Monitor weight.  5. Nutrition rep will continue to see patient for ongoing meal and snack preferences.     RD monitoring.

## 2019-05-01 NOTE — PROGRESS NOTES
Hospital Medicine Daily Progress Note    Date of Service  4/30/2019    Chief Complaint  58 y.o. female admitted 4/29/2019 with suicide attempt, alcohol intoxication/alcohol withdrawal      Interval Problem Update  Patient is resting in bed, continue complaining of weakness and tremors, low oral intake, below glucose stable, will decrease D5 normal saline IV fluids rate but continue checking blood sugars every 6 hours, psychiatry has evaluated patient, legal order extended, continue CIWA protocol, continue following electrolytes and correcting as needed.    Consultants/Specialty  Psychiatry    Code Status  Full code    Disposition  To be determined    Review of Systems  Review of Systems   Constitutional: Negative for chills and fever.   HENT: Negative for congestion.    Eyes: Negative for blurred vision and double vision.   Respiratory: Negative for cough and hemoptysis.    Cardiovascular: Negative for chest pain and palpitations.   Gastrointestinal: Negative for heartburn and nausea.   Genitourinary: Negative for dysuria and urgency.   Musculoskeletal: Negative for myalgias and neck pain.   Skin: Negative for itching.   Neurological: Positive for tremors and weakness. Negative for dizziness and headaches.   Endo/Heme/Allergies: Does not bruise/bleed easily.        Physical Exam  Temp:  [36 °C (96.8 °F)-37.1 °C (98.8 °F)] 36.2 °C (97.1 °F)  Pulse:  [] 92  Resp:  [15-19] 15  BP: (128-180)/() 180/106  SpO2:  [90 %-94 %] 94 %    Physical Exam   Constitutional: She is oriented to person, place, and time. She appears well-nourished. No distress.   HENT:   Head: Normocephalic and atraumatic.   Mouth/Throat: No oropharyngeal exudate.   Eyes: Conjunctivae are normal. Right eye exhibits no discharge. Left eye exhibits no discharge. No scleral icterus.   Neck: Normal range of motion. Neck supple. No JVD present.   Cardiovascular: Regular rhythm and normal heart sounds.  Exam reveals no friction rub.     Pulmonary/Chest: Effort normal and breath sounds normal. No stridor. No respiratory distress. She has no wheezes.   Abdominal: Soft. Bowel sounds are normal. She exhibits no distension. There is no tenderness. There is no rebound.   Musculoskeletal: Normal range of motion. She exhibits no tenderness.   Lymphadenopathy:     She has no cervical adenopathy.   Neurological: She is oriented to person, place, and time. She exhibits normal muscle tone.   Skin: Skin is dry. No erythema.   Psychiatric: She has a normal mood and affect.   Nursing note and vitals reviewed.      Fluids  No intake or output data in the 24 hours ending 04/30/19 1833    Laboratory  Recent Labs      04/29/19 1747 04/30/19   0009   WBC  8.0  5.7   RBC  3.68*  3.40*   HEMOGLOBIN  11.3*  10.5*   HEMATOCRIT  35.8*  33.2*   MCV  97.3  97.6   MCH  30.7  30.9   MCHC  31.6*  31.6*   RDW  55.5*  55.6*   PLATELETCT  619*  563*   MPV  9.3  9.6     Recent Labs      04/29/19 1747 04/30/19   0008   SODIUM  142  140   POTASSIUM  3.6  3.4*   CHLORIDE  106  105   CO2  19*  24   GLUCOSE  59*  118*   BUN  8  8   CREATININE  0.64  0.64   CALCIUM  9.2  8.8                   Imaging  No orders to display        Assessment/Plan  * Drug overdose, intentional (HCC)   Assessment & Plan    This patient presents with SI and intential drug overdose of prozac and lisinopril   IP psych consultation in the am   Legal hold  [facility]  Poison control case # [id number].  Ms. Marquis was here with a confirmed overdose of Unknown substance or drug; she has no other known overdoses.    Continue monitoring       Hypokalemia- (present on admission)   Assessment & Plan    Replacing, has low magnesium 1.6 will start p.o. magnesium supplement     Bipolar 2 disorder (HCC)- (present on admission)   Assessment & Plan    History of, IP psych consulted     PTSD (post-traumatic stress disorder)- (present on admission)   Assessment & Plan    History of ip psych consultation     Suicidal ideation    Assessment & Plan    Continue sitter  Legal hold  Psych consult     Anemia   Assessment & Plan    Most likely chronic, continue monitoring, no need for blood transfusion at this time.     Hypoglycemia   Assessment & Plan    Hypoglycemia protocol ordered  Minimal oral intake, continue D5 normal saline although will decrease rate to 75 cc/h continue blood glucose monitor.     Alcohol intoxication (HCC)   Assessment & Plan    Continue CIWA protocol.      VTE prophylaxis: Heparin

## 2019-05-01 NOTE — THERAPY
"Physical Therapy Evaluation completed.   Bed Mobility:  Supine to Sit: Supervised  Transfers: Sit to Stand: Supervised  Gait: Level Of Assist: Supervised with IV pole        Plan of Care: Patient with no further skilled PT needs in the acute care setting at this time and Patient demonstrates safety with mobility in this environment at this time.   Discharge Recommendations: Equipment: Single Point Cane. Post-acute therapy Currently anticipate no further skilled therapy needs once patient is discharged from the inpatient setting.    See \"Rehab Therapy-Acute\" Patient Summary Report for complete documentation.       Pt is a 59 y/o female presenting to acute setting with suicide attempt, alcohol intoxication/alcohol withdrawal. Pt irritable but willing to work with PT to \"get to the shitter and that's it.\" Inquired about Pts need for stairs as that was the report I received earlier, but according to Pt she is homeless and \"doesn't need to do fucking stairs.\" Pt with no acute PT needs and is mobilizing at her baseline. She normally has a cane which is not currently with her. Pt uses IV pole for support and may need a SPC at DC if hers is not found. PT will not be following, available for DC needs only.   "

## 2019-05-01 NOTE — PROGRESS NOTES
Report received from TETO Alfredo. Plan of care reviewed with patient, denies any questions. No needs voiced at this time. Sitter in place at bedside.

## 2019-05-01 NOTE — CARE PLAN
Problem: Safety  Goal: Will remain free from falls  Outcome: PROGRESSING SLOWER THAN EXPECTED  Bed locked in lowest position; sitter in place

## 2019-05-02 LAB
ANION GAP SERPL CALC-SCNC: 9 MMOL/L (ref 0–11.9)
BUN SERPL-MCNC: 12 MG/DL (ref 8–22)
CALCIUM SERPL-MCNC: 8.6 MG/DL (ref 8.5–10.5)
CHLORIDE SERPL-SCNC: 110 MMOL/L (ref 96–112)
CO2 SERPL-SCNC: 22 MMOL/L (ref 20–33)
CREAT SERPL-MCNC: 0.72 MG/DL (ref 0.5–1.4)
GLUCOSE BLD-MCNC: 112 MG/DL (ref 65–99)
GLUCOSE BLD-MCNC: 113 MG/DL (ref 65–99)
GLUCOSE BLD-MCNC: 113 MG/DL (ref 65–99)
GLUCOSE BLD-MCNC: 93 MG/DL (ref 65–99)
GLUCOSE SERPL-MCNC: 116 MG/DL (ref 65–99)
MAGNESIUM SERPL-MCNC: 1.4 MG/DL (ref 1.5–2.5)
POTASSIUM SERPL-SCNC: 3.9 MMOL/L (ref 3.6–5.5)
SODIUM SERPL-SCNC: 141 MMOL/L (ref 135–145)

## 2019-05-02 PROCEDURE — 83735 ASSAY OF MAGNESIUM: CPT

## 2019-05-02 PROCEDURE — 36415 COLL VENOUS BLD VENIPUNCTURE: CPT

## 2019-05-02 PROCEDURE — A9270 NON-COVERED ITEM OR SERVICE: HCPCS | Performed by: HOSPITALIST

## 2019-05-02 PROCEDURE — 700111 HCHG RX REV CODE 636 W/ 250 OVERRIDE (IP): Performed by: HOSPITALIST

## 2019-05-02 PROCEDURE — 80048 BASIC METABOLIC PNL TOTAL CA: CPT

## 2019-05-02 PROCEDURE — 82962 GLUCOSE BLOOD TEST: CPT | Mod: 91

## 2019-05-02 PROCEDURE — 700102 HCHG RX REV CODE 250 W/ 637 OVERRIDE(OP): Performed by: HOSPITALIST

## 2019-05-02 PROCEDURE — 700111 HCHG RX REV CODE 636 W/ 250 OVERRIDE (IP): Performed by: FAMILY MEDICINE

## 2019-05-02 PROCEDURE — 99232 SBSQ HOSP IP/OBS MODERATE 35: CPT | Performed by: HOSPITALIST

## 2019-05-02 PROCEDURE — 770020 HCHG ROOM/CARE - TELE (206)

## 2019-05-02 RX ORDER — MAGNESIUM SULFATE HEPTAHYDRATE 40 MG/ML
2 INJECTION, SOLUTION INTRAVENOUS ONCE
Status: COMPLETED | OUTPATIENT
Start: 2019-05-02 | End: 2019-05-02

## 2019-05-02 RX ORDER — LISINOPRIL 10 MG/1
10 TABLET ORAL 2 TIMES DAILY
Status: DISCONTINUED | OUTPATIENT
Start: 2019-05-02 | End: 2019-05-11 | Stop reason: HOSPADM

## 2019-05-02 RX ORDER — CHLORDIAZEPOXIDE HYDROCHLORIDE 25 MG/1
25 CAPSULE, GELATIN COATED ORAL 2 TIMES DAILY
Status: DISCONTINUED | OUTPATIENT
Start: 2019-05-02 | End: 2019-05-04

## 2019-05-02 RX ADMIN — LISINOPRIL 10 MG: 10 TABLET ORAL at 17:18

## 2019-05-02 RX ADMIN — MAGNESIUM SULFATE IN WATER 2 G: 40 INJECTION, SOLUTION INTRAVENOUS at 02:11

## 2019-05-02 RX ADMIN — IBUPROFEN 400 MG: 800 TABLET, FILM COATED ORAL at 01:37

## 2019-05-02 RX ADMIN — LORAZEPAM 1 MG: 1 TABLET ORAL at 09:57

## 2019-05-02 RX ADMIN — THERA TABS 1 TABLET: TAB at 05:47

## 2019-05-02 RX ADMIN — FOLIC ACID 1 MG: 1 TABLET ORAL at 05:48

## 2019-05-02 RX ADMIN — MAGNESIUM OXIDE TAB 400 MG (241.3 MG ELEMENTAL MG) 400 MG: 400 (241.3 MG) TAB at 05:48

## 2019-05-02 RX ADMIN — HEPARIN SODIUM 5000 UNITS: 5000 INJECTION, SOLUTION INTRAVENOUS; SUBCUTANEOUS at 14:18

## 2019-05-02 RX ADMIN — IBUPROFEN 400 MG: 800 TABLET, FILM COATED ORAL at 08:52

## 2019-05-02 RX ADMIN — HEPARIN SODIUM 5000 UNITS: 5000 INJECTION, SOLUTION INTRAVENOUS; SUBCUTANEOUS at 20:28

## 2019-05-02 RX ADMIN — Medication 1 CAPSULE: at 08:52

## 2019-05-02 RX ADMIN — LORAZEPAM 3 MG: 2 TABLET ORAL at 01:34

## 2019-05-02 RX ADMIN — ONDANSETRON 4 MG: 4 TABLET, ORALLY DISINTEGRATING ORAL at 23:56

## 2019-05-02 RX ADMIN — CHLORDIAZEPOXIDE HYDROCHLORIDE 25 MG: 25 CAPSULE ORAL at 17:18

## 2019-05-02 RX ADMIN — IBUPROFEN 400 MG: 800 TABLET, FILM COATED ORAL at 23:56

## 2019-05-02 RX ADMIN — MAGNESIUM SULFATE IN WATER 2 G: 40 INJECTION, SOLUTION INTRAVENOUS at 09:50

## 2019-05-02 RX ADMIN — PANCRELIPASE 3000 UNITS: 15000; 3000; 9500 CAPSULE, DELAYED RELEASE ORAL at 18:00

## 2019-05-02 RX ADMIN — OMEPRAZOLE 20 MG: 20 CAPSULE, DELAYED RELEASE ORAL at 05:48

## 2019-05-02 RX ADMIN — LORAZEPAM 0.5 MG: 1 TABLET ORAL at 23:56

## 2019-05-02 RX ADMIN — Medication 100 MG: at 05:48

## 2019-05-02 RX ADMIN — HEPARIN SODIUM 5000 UNITS: 5000 INJECTION, SOLUTION INTRAVENOUS; SUBCUTANEOUS at 05:48

## 2019-05-02 RX ADMIN — LISINOPRIL 10 MG: 5 TABLET ORAL at 05:48

## 2019-05-02 RX ADMIN — IBUPROFEN 400 MG: 800 TABLET, FILM COATED ORAL at 17:21

## 2019-05-02 RX ADMIN — LORAZEPAM 3 MG: 2 TABLET ORAL at 05:55

## 2019-05-02 ASSESSMENT — LIFESTYLE VARIABLES
TOTAL SCORE: VERY MILD ITCHING, PINS AND NEEDLES SENSATION, BURNING OR NUMBNESS
NAUSEA AND VOMITING: MILD NAUSEA WITH NO VOMITING
HEADACHE, FULLNESS IN HEAD: NOT PRESENT
AUDITORY DISTURBANCES: NOT PRESENT
NAUSEA AND VOMITING: MILD NAUSEA WITH NO VOMITING
HEADACHE, FULLNESS IN HEAD: MILD
VISUAL DISTURBANCES: NOT PRESENT
VISUAL DISTURBANCES: NOT PRESENT
HEADACHE, FULLNESS IN HEAD: VERY MILD
AUDITORY DISTURBANCES: NOT PRESENT
AUDITORY DISTURBANCES: MILD HARSHNESS OR ABILITY TO FRIGHTEN
AGITATION: NORMAL ACTIVITY
ANXIETY: NO ANXIETY (AT EASE)
ORIENTATION AND CLOUDING OF SENSORIUM: ORIENTED AND CAN DO SERIAL ADDITIONS
NAUSEA AND VOMITING: MILD NAUSEA WITH NO VOMITING
AGITATION: *
AUDITORY DISTURBANCES: NOT PRESENT
HEADACHE, FULLNESS IN HEAD: NOT PRESENT
PAROXYSMAL SWEATS: NO SWEAT VISIBLE
ORIENTATION AND CLOUDING OF SENSORIUM: ORIENTED AND CAN DO SERIAL ADDITIONS
TREMOR: NO TREMOR
ORIENTATION AND CLOUDING OF SENSORIUM: ORIENTED AND CAN DO SERIAL ADDITIONS
TREMOR: *
VISUAL DISTURBANCES: VERY MILD SENSITIVITY
ORIENTATION AND CLOUDING OF SENSORIUM: ORIENTED AND CAN DO SERIAL ADDITIONS
TREMOR: NO TREMOR
TREMOR: NO TREMOR
PAROXYSMAL SWEATS: NO SWEAT VISIBLE
ANXIETY: MODERATELY ANXIOUS OR GUARDED, SO ANXIETY IS INFERRED
ORIENTATION AND CLOUDING OF SENSORIUM: ORIENTED AND CAN DO SERIAL ADDITIONS
AGITATION: *
ORIENTATION AND CLOUDING OF SENSORIUM: ORIENTED AND CAN DO SERIAL ADDITIONS
TOTAL SCORE: 4
ANXIETY: *
AGITATION: MODERATELY FIDGETY AND RESTLESS
NAUSEA AND VOMITING: NO NAUSEA AND NO VOMITING
NAUSEA AND VOMITING: MILD NAUSEA WITH NO VOMITING
TOTAL SCORE: VERY MILD ITCHING, PINS AND NEEDLES SENSATION, BURNING OR NUMBNESS
ANXIETY: *
PAROXYSMAL SWEATS: NO SWEAT VISIBLE
TOTAL SCORE: 19
AUDITORY DISTURBANCES: NOT PRESENT
NAUSEA AND VOMITING: NO NAUSEA AND NO VOMITING
HEADACHE, FULLNESS IN HEAD: MILD
VISUAL DISTURBANCES: NOT PRESENT
AGITATION: *
TREMOR: NO TREMOR
ANXIETY: *
PAROXYSMAL SWEATS: BARELY PERCEPTIBLE SWEATING. PALMS MOIST
TOTAL SCORE: 4
VISUAL DISTURBANCES: MILD SENSITIVITY
VISUAL DISTURBANCES: NOT PRESENT
TOTAL SCORE: 5
HEADACHE, FULLNESS IN HEAD: VERY MILD
AUDITORY DISTURBANCES: MILD HARSHNESS OR ABILITY TO FRIGHTEN
PAROXYSMAL SWEATS: BARELY PERCEPTIBLE SWEATING. PALMS MOIST
AGITATION: *
HEADACHE, FULLNESS IN HEAD: NOT PRESENT
TOTAL SCORE: 4
ORIENTATION AND CLOUDING OF SENSORIUM: ORIENTED AND CAN DO SERIAL ADDITIONS
PAROXYSMAL SWEATS: NO SWEAT VISIBLE
TOTAL SCORE: 19
TOTAL SCORE: MILD ITCHING, PINS AND NEEDLES SENSATION, BURNING OR NUMBNESS
VISUAL DISTURBANCES: MILD SENSITIVITY
AUDITORY DISTURBANCES: NOT PRESENT
TREMOR: NO TREMOR
AGITATION: *
ANXIETY: MODERATELY ANXIOUS OR GUARDED, SO ANXIETY IS INFERRED
NAUSEA AND VOMITING: NO NAUSEA AND NO VOMITING
TOTAL SCORE: 8
TREMOR: *
ANXIETY: *
PAROXYSMAL SWEATS: NO SWEAT VISIBLE

## 2019-05-02 ASSESSMENT — ENCOUNTER SYMPTOMS
FEVER: 0
PALPITATIONS: 0
TREMORS: 1
HEADACHES: 0
COUGH: 0
HEARTBURN: 0
DIZZINESS: 0
WEAKNESS: 1
MYALGIAS: 0
CHILLS: 0
NECK PAIN: 0
BLURRED VISION: 0
BRUISES/BLEEDS EASILY: 0

## 2019-05-02 NOTE — PROGRESS NOTES
Hospital Medicine Daily Progress Note    Date of Service  5/1/2019    Chief Complaint  58 y.o. female admitted 4/29/2019 with suicide attempt, alcohol intoxication/alcohol withdrawal      Interval Problem Update  Patient is resting in bed, she feels a little bit better, no fever chills, no abdominal pain, no shortness of breath, tolerating diet, had a little bit of diarrhea today will probiotics.    Consultants/Specialty  Psychiatry    Code Status  Full code    Disposition  To be determined    Review of Systems  Review of Systems   Constitutional: Negative for chills and fever.   HENT: Negative for congestion.    Eyes: Negative for blurred vision and double vision.   Respiratory: Negative for cough and sputum production.    Cardiovascular: Negative for chest pain and orthopnea.   Gastrointestinal: Negative for heartburn and nausea.   Genitourinary: Negative for dysuria and urgency.   Musculoskeletal: Negative for back pain and myalgias.   Skin: Negative for itching.   Neurological: Positive for tremors and weakness. Negative for dizziness and headaches.   Endo/Heme/Allergies: Does not bruise/bleed easily.        Physical Exam  Temp:  [36.8 °C (98.2 °F)-37.7 °C (99.8 °F)] 36.9 °C (98.4 °F)  Pulse:  [73-98] 73  Resp:  [14-18] 16  BP: (127-148)/(78-98) 127/80  SpO2:  [93 %-95 %] 94 %    Physical Exam   Constitutional: She is oriented to person, place, and time. She appears well-nourished. No distress.   HENT:   Head: Normocephalic and atraumatic.   Mouth/Throat: No oropharyngeal exudate.   Eyes: Conjunctivae are normal. No scleral icterus.   Neck: Normal range of motion. Neck supple.   Cardiovascular: Regular rhythm and normal heart sounds.  Exam reveals no friction rub.    Pulmonary/Chest: Effort normal and breath sounds normal. No respiratory distress. She has no wheezes.   Abdominal: Soft. Bowel sounds are normal. She exhibits no distension. There is no rebound.   Musculoskeletal: Normal range of motion. She  exhibits no tenderness.   Neurological: She is oriented to person, place, and time. She exhibits normal muscle tone.   Skin: Skin is dry. No erythema.   Psychiatric: She has a normal mood and affect.   Nursing note and vitals reviewed.      Fluids    Intake/Output Summary (Last 24 hours) at 05/01/19 1832  Last data filed at 05/01/19 1500   Gross per 24 hour   Intake          3263.33 ml   Output                0 ml   Net          3263.33 ml       Laboratory  Recent Labs      04/29/19 1747 04/30/19 0009  05/01/19   0052   WBC  8.0  5.7  5.6   RBC  3.68*  3.40*  3.59*   HEMOGLOBIN  11.3*  10.5*  11.2*   HEMATOCRIT  35.8*  33.2*  34.7*   MCV  97.3  97.6  96.7   MCH  30.7  30.9  31.2   MCHC  31.6*  31.6*  32.3*   RDW  55.5*  55.6*  53.8*   PLATELETCT  619*  563*  487*   MPV  9.3  9.6  9.5     Recent Labs      04/29/19 1747 04/30/19   0008  05/01/19   0052   SODIUM  142  140  140   POTASSIUM  3.6  3.4*  3.2*   CHLORIDE  106  105  106   CO2  19*  24  26   GLUCOSE  59*  118*  94   BUN  8  8  6*   CREATININE  0.64  0.64  0.56   CALCIUM  9.2  8.8  8.6                   Imaging  No orders to display        Assessment/Plan  * Drug overdose, intentional (HCC)   Assessment & Plan    This patient presents with SI and intential drug overdose of prozac and lisinopril   IP psych consultation in the    Legal hold  [facility]  Poison control case # [id number].  Ms. Marquis was here with a confirmed overdose of Unknown substance or drug; she has no other known overdoses.    Continue monitoring.       Hypokalemia- (present on admission)   Assessment & Plan    Magnesium still 1.6, will increase p.o. supplement     Bipolar 2 disorder (HCC)- (present on admission)   Assessment & Plan    History of, IP psych consulted     PTSD (post-traumatic stress disorder)- (present on admission)   Assessment & Plan    History of ip psych consultation     Suicidal ideation   Assessment & Plan    Continue sitter  Legal hold  Psych consult     Anemia    Assessment & Plan    Most likely chronic, continue monitoring, no need for blood transfusion at this time.  Stable     Hypoglycemia   Assessment & Plan    Resolved, patient is tolerating diet, decreased IV fluids.     Alcohol intoxication (HCC)   Assessment & Plan    Continue CIWA protocol, CIWA still high      VTE prophylaxis: Heparin

## 2019-05-02 NOTE — CARE PLAN
Problem: Safety  Goal: Will remain free from falls  Outcome: PROGRESSING SLOWER THAN EXPECTED  Bed locked in lowest position and call light is within reach; patient calls appropriately.  Sitter in place

## 2019-05-02 NOTE — PROGRESS NOTES
Hospital Medicine Daily Progress Note    Date of Service  5/2/2019    Chief Complaint  58 y.o. female admitted 4/29/2019 with suicide attempt, alcohol intoxication/alcohol withdrawal      Interval Problem Update  Patient is resting in bed, continue complaining of feeling tired and having abdominal cramps and diarrhea after meals, denies any fever chills shortness of breath, plan to start Librium and pancreatic enzymes today, psychiatry following.    Consultants/Specialty  Psychiatry    Code Status  Full code    Disposition  To psychiatric facility when stable.    Review of Systems  Review of Systems   Constitutional: Negative for chills and fever.   HENT: Negative for congestion.    Eyes: Negative for blurred vision.   Respiratory: Negative for cough.    Cardiovascular: Negative for chest pain and palpitations.   Gastrointestinal: Negative for heartburn.   Genitourinary: Negative for dysuria and frequency.   Musculoskeletal: Negative for myalgias and neck pain.   Skin: Negative for itching.   Neurological: Positive for tremors and weakness. Negative for dizziness and headaches.   Endo/Heme/Allergies: Does not bruise/bleed easily.        Physical Exam  Temp:  [36.2 °C (97.2 °F)-36.7 °C (98.1 °F)] 36.3 °C (97.3 °F)  Pulse:  [63-97] 63  Resp:  [14-18] 17  BP: (119-179)/() 148/77  SpO2:  [93 %-99 %] 96 %    Physical Exam   Constitutional: She is oriented to person, place, and time. She appears well-nourished. No distress.   HENT:   Head: Normocephalic and atraumatic.   Mouth/Throat: No oropharyngeal exudate.   Eyes: Conjunctivae are normal. No scleral icterus.   Neck: Normal range of motion. Neck supple.   Cardiovascular: Regular rhythm and normal heart sounds.  Exam reveals no friction rub.    Pulmonary/Chest: Effort normal and breath sounds normal. No respiratory distress. She has no rales.   Abdominal: Soft. Bowel sounds are normal. She exhibits no distension. There is no rebound.   Musculoskeletal: Normal  range of motion. She exhibits no tenderness.   Neurological: She is oriented to person, place, and time. She exhibits normal muscle tone.   Skin: Skin is dry. No erythema.   Psychiatric: She has a normal mood and affect.   Nursing note and vitals reviewed.      Fluids    Intake/Output Summary (Last 24 hours) at 05/02/19 1658  Last data filed at 05/02/19 0411   Gross per 24 hour   Intake               50 ml   Output                0 ml   Net               50 ml       Laboratory  Recent Labs      04/29/19   1747  04/30/19   0009  05/01/19   0052   WBC  8.0  5.7  5.6   RBC  3.68*  3.40*  3.59*   HEMOGLOBIN  11.3*  10.5*  11.2*   HEMATOCRIT  35.8*  33.2*  34.7*   MCV  97.3  97.6  96.7   MCH  30.7  30.9  31.2   MCHC  31.6*  31.6*  32.3*   RDW  55.5*  55.6*  53.8*   PLATELETCT  619*  563*  487*   MPV  9.3  9.6  9.5     Recent Labs      04/30/19   0008  05/01/19   0052  05/02/19   0011   SODIUM  140  140  141   POTASSIUM  3.4*  3.2*  3.9   CHLORIDE  105  106  110   CO2  24  26  22   GLUCOSE  118*  94  116*   BUN  8  6*  12   CREATININE  0.64  0.56  0.72   CALCIUM  8.8  8.6  8.6                   Imaging  No orders to display        Assessment/Plan  * Drug overdose, intentional (HCC)   Assessment & Plan    This patient presents with SI and intential drug overdose of prozac and lisinopril   IP psych consultation in the am   Legal hold  [facility]  Poison control case # [id number].  Ms. Marquis was here with a confirmed overdose of Unknown substance or drug; she has no other known overdoses.    Continue monitoring.       Hypokalemia- (present on admission)   Assessment & Plan    Hypokalemia resolved, she continued having hypomagnesemia she is getting IV magnesium     Bipolar 2 disorder (HCC)- (present on admission)   Assessment & Plan    History of, IP psych consulted     PTSD (post-traumatic stress disorder)- (present on admission)   Assessment & Plan    History of ip psych consultation     Suicidal ideation   Assessment & Plan     Continue sitter  Legal hold  Psych consult     Anemia   Assessment & Plan    Most likely chronic, continue monitoring, no need for blood transfusion at this time.  Stable     Hypoglycemia   Assessment & Plan    Resolved, tolerating diet will stop D5 fluids     Alcohol intoxication (HCC)   Assessment & Plan    Continue CIWA protocol, CIWA still high, started on Librium continue monitoring.      VTE prophylaxis: Heparin

## 2019-05-02 NOTE — PROGRESS NOTES
Report received from TETO Alfredo. Plan of care reviewed with patient, denies any questions. No needs voiced at this time. Call light within reach, bed locked and in lowest position.

## 2019-05-03 LAB — GLUCOSE BLD-MCNC: 86 MG/DL (ref 65–99)

## 2019-05-03 PROCEDURE — 700111 HCHG RX REV CODE 636 W/ 250 OVERRIDE (IP): Performed by: HOSPITALIST

## 2019-05-03 PROCEDURE — A9270 NON-COVERED ITEM OR SERVICE: HCPCS | Performed by: PSYCHIATRY & NEUROLOGY

## 2019-05-03 PROCEDURE — 770020 HCHG ROOM/CARE - TELE (206)

## 2019-05-03 PROCEDURE — 99233 SBSQ HOSP IP/OBS HIGH 50: CPT | Performed by: PSYCHIATRY & NEUROLOGY

## 2019-05-03 PROCEDURE — 82962 GLUCOSE BLOOD TEST: CPT

## 2019-05-03 PROCEDURE — A9270 NON-COVERED ITEM OR SERVICE: HCPCS | Performed by: HOSPITALIST

## 2019-05-03 PROCEDURE — 700102 HCHG RX REV CODE 250 W/ 637 OVERRIDE(OP): Performed by: HOSPITALIST

## 2019-05-03 PROCEDURE — 700102 HCHG RX REV CODE 250 W/ 637 OVERRIDE(OP): Performed by: PSYCHIATRY & NEUROLOGY

## 2019-05-03 PROCEDURE — 99232 SBSQ HOSP IP/OBS MODERATE 35: CPT | Performed by: HOSPITALIST

## 2019-05-03 RX ORDER — QUETIAPINE FUMARATE 25 MG/1
50 TABLET, FILM COATED ORAL
Status: DISCONTINUED | OUTPATIENT
Start: 2019-05-03 | End: 2019-05-11 | Stop reason: HOSPADM

## 2019-05-03 RX ORDER — LORAZEPAM 1 MG/1
1 TABLET ORAL EVERY 6 HOURS PRN
Status: DISCONTINUED | OUTPATIENT
Start: 2019-05-03 | End: 2019-05-09

## 2019-05-03 RX ADMIN — FOLIC ACID 1 MG: 1 TABLET ORAL at 06:16

## 2019-05-03 RX ADMIN — Medication 1 CAPSULE: at 08:45

## 2019-05-03 RX ADMIN — CHLORDIAZEPOXIDE HYDROCHLORIDE 25 MG: 25 CAPSULE ORAL at 17:51

## 2019-05-03 RX ADMIN — OMEPRAZOLE 20 MG: 20 CAPSULE, DELAYED RELEASE ORAL at 06:16

## 2019-05-03 RX ADMIN — THERA TABS 1 TABLET: TAB at 06:16

## 2019-05-03 RX ADMIN — PANCRELIPASE 3000 UNITS: 15000; 3000; 9500 CAPSULE, DELAYED RELEASE ORAL at 18:44

## 2019-05-03 RX ADMIN — CHLORDIAZEPOXIDE HYDROCHLORIDE 25 MG: 25 CAPSULE ORAL at 06:16

## 2019-05-03 RX ADMIN — HEPARIN SODIUM 5000 UNITS: 5000 INJECTION, SOLUTION INTRAVENOUS; SUBCUTANEOUS at 06:17

## 2019-05-03 RX ADMIN — IBUPROFEN 400 MG: 800 TABLET, FILM COATED ORAL at 06:23

## 2019-05-03 RX ADMIN — PANCRELIPASE 3000 UNITS: 15000; 3000; 9500 CAPSULE, DELAYED RELEASE ORAL at 08:45

## 2019-05-03 RX ADMIN — LORAZEPAM 1 MG: 1 TABLET ORAL at 14:21

## 2019-05-03 RX ADMIN — HEPARIN SODIUM 5000 UNITS: 5000 INJECTION, SOLUTION INTRAVENOUS; SUBCUTANEOUS at 13:08

## 2019-05-03 RX ADMIN — PANCRELIPASE 3000 UNITS: 15000; 3000; 9500 CAPSULE, DELAYED RELEASE ORAL at 13:08

## 2019-05-03 RX ADMIN — QUETIAPINE FUMARATE 50 MG: 25 TABLET ORAL at 20:45

## 2019-05-03 RX ADMIN — IBUPROFEN 400 MG: 800 TABLET, FILM COATED ORAL at 13:08

## 2019-05-03 RX ADMIN — Medication 100 MG: at 06:16

## 2019-05-03 RX ADMIN — LISINOPRIL 10 MG: 10 TABLET ORAL at 06:16

## 2019-05-03 RX ADMIN — LISINOPRIL 10 MG: 10 TABLET ORAL at 17:52

## 2019-05-03 RX ADMIN — HEPARIN SODIUM 5000 UNITS: 5000 INJECTION, SOLUTION INTRAVENOUS; SUBCUTANEOUS at 20:46

## 2019-05-03 ASSESSMENT — ENCOUNTER SYMPTOMS
HEARTBURN: 0
NECK PAIN: 0
BLURRED VISION: 0
TREMORS: 1
BRUISES/BLEEDS EASILY: 0
FEVER: 0
MYALGIAS: 0
DIZZINESS: 0
HEADACHES: 0
ORTHOPNEA: 0
WEAKNESS: 1
COUGH: 0

## 2019-05-03 ASSESSMENT — LIFESTYLE VARIABLES
AUDITORY DISTURBANCES: NOT PRESENT
TOTAL SCORE: 5
PAROXYSMAL SWEATS: NO SWEAT VISIBLE
VISUAL DISTURBANCES: NOT PRESENT
AGITATION: *
ORIENTATION AND CLOUDING OF SENSORIUM: ORIENTED AND CAN DO SERIAL ADDITIONS
HEADACHE, FULLNESS IN HEAD: NOT PRESENT
TREMOR: NO TREMOR
NAUSEA AND VOMITING: NO NAUSEA AND NO VOMITING
ANXIETY: *

## 2019-05-03 NOTE — CARE PLAN
Problem: Safety  Goal: Will remain free from falls  Outcome: PROGRESSING SLOWER THAN EXPECTED  Bed locked in lowest position and call light is within reach; patient calls appropriately.  Sitter in place at bedside

## 2019-05-03 NOTE — PROGRESS NOTES
Report received from TETO Fernandez. Patient with sitter at bedside.  Plan of care reviewed with patient, denies any questions. No needs voiced at this time. Call light within reach, bed locked and in lowest position.

## 2019-05-03 NOTE — CARE PLAN
Problem: Nutritional:  Goal: Achieve adequate nutritional intake  Patient will consume >50% of meals/snacks   Outcome: MET Date Met: 05/03/19  Pt with PO intake of % x 4 on Regular diet with milkshakes BID. Per MD progress note, pt with diarrhea after meals and starting on librium and pancreatic enzymes. Pt with hypomagnesemia though receiving IV Mag.     Labs reviewed: 5/2 Mag 1.2.   MAR reviewed: Culturelle 1 cap QD, Creon 3000 TID. D5 NS discontinued following pt tolerating diet.     RD to follow per protocol.

## 2019-05-03 NOTE — PSYCHIATRY
"PSYCHIATRIC FOLLOW-UP:(established)   *Reason for admission: Suicide attempt via overdose   *Reason for consult: Suicide attempt  *Requesting Physician: Dr. Cummings       *HPI:      On approach, pt was irritable, and said \"I want to leave\". This provider attempted to evaluate pt, however she continued to demand to leave and questioned  \"what an psychiatric hospital going to do for me? How long am I gonna have to be there?\". Pt said if she is not on ativan or klonopin, she gets angry, and asked to be placed on these medications.  Pt stated she does not know how is treating her and was uncooperative with evalaution.      Medical ROS (as pertinent):   Unable to obtain                      *Psychiatric Examination:  Vitals:   Vitals:    05/03/19 1204   BP: 116/76   Pulse: 85   Resp: 18   Temp: 36.1 °C (96.9 °F)   SpO2: 97%       General Appearance:   Abnormal Movements:   Gait and Posture:   Speech:   Thought processes:  Associations:   Abnormal or Psychotic Thoughts:   Judgement and Insight:   Orientation:   Recent and Remote Memory:   Attention Span and Concentration:   Language:   Fund of Knowledge:  Mood and Affect:  SI/HI:     Current Facility-Administered Medications   Medication Dose Route Frequency Provider Last Rate Last Dose   • LORazepam (ATIVAN) tablet 1 mg  1 mg Oral Q6HRS PRN David Richmond M.D.   1 mg at 05/03/19 1421   • lisinopril (PRINIVIL) tablet 10 mg  10 mg Oral BID David Richmond M.D.   10 mg at 05/03/19 0616   • chlordiazePOXIDE (LIBRIUM) capsule 25 mg  25 mg Oral BID David Richmond M.D.   25 mg at 05/03/19 0616   • pancrelipase (Lip-Prot-Amyl) (CREON 3000) 0096-5722 units capsule 3,000 Units  1 Cap Oral TID WITH MEALS David Richmond M.D.   3,000 Units at 05/03/19 1308   • ibuprofen (MOTRIN) tablet 400 mg  400 mg Oral Q6HRS PRN David Richmond M.D.   400 mg at 05/03/19 1308   • omeprazole (PRILOSEC) capsule 20 mg  20 mg Oral DAILY David Richmond M.D.   20 mg " at 05/03/19 0616   • lactobacillus rhamnosus (CULTURELLE) capsule 1 Cap  1 Cap Oral QDAY with Breakfast David Richmond M.D.   1 Cap at 05/03/19 0845   • DEXTROSE 10% BOLUS 125 mL  125 mL Intravenous Q15 MIN PRN Elaina Arce M.D.       • Influenza Vaccine Quad pf injection 0.5 mL  0.5 mL Intramuscular Once PRN David Richmond M.D.       • senna-docusate (PERICOLACE or SENOKOT S) 8.6-50 MG per tablet 2 Tab  2 Tab Oral BID Eliana Arce M.D.   Stopped at 05/02/19 0600    And   • polyethylene glycol/lytes (MIRALAX) PACKET 1 Packet  1 Packet Oral QDAY PRN Eliana Arce M.D.        And   • magnesium hydroxide (MILK OF MAGNESIA) suspension 30 mL  30 mL Oral QDAY PRN Eliana Arce M.D.        And   • bisacodyl (DULCOLAX) suppository 10 mg  10 mg Rectal QDAY PRN Eliana Arce M.D.       • heparin injection 5,000 Units  5,000 Units Subcutaneous Q8HRS Eliana Arce M.D.   5,000 Units at 05/03/19 1308   • ondansetron (ZOFRAN) syringe/vial injection 4 mg  4 mg Intravenous Q4HRS PRN Eliana Arce M.D.       • ondansetron (ZOFRAN ODT) dispertab 4 mg  4 mg Oral Q4HRS PRN Eliana Arce M.D.   4 mg at 05/02/19 2356   • promethazine (PHENERGAN) tablet 12.5-25 mg  12.5-25 mg Oral Q4HRS PRN Eliana Arce M.D.       • promethazine (PHENERGAN) suppository 12.5-25 mg  12.5-25 mg Rectal Q4HRS PRN Eliana Arce M.D.       • prochlorperazine (COMPAZINE) injection 5-10 mg  5-10 mg Intravenous Q4HRS PRN Mohammad Y Hanif, M.D.       • glucose 4 g chewable tablet 16 g  16 g Oral Q15 MIN KHADIJAH Arce M.D.         Recent Results (from the past 48 hour(s))   Basic Metabolic Panel    Collection Time: 05/02/19 12:11 AM   Result Value Ref Range    Sodium 141 135 - 145 mmol/L    Potassium 3.9 3.6 - 5.5 mmol/L    Chloride 110 96 - 112 mmol/L    Co2 22 20 - 33 mmol/L    Glucose 116 (H) 65 - 99 mg/dL    Bun 12 8 - 22 mg/dL    Creatinine 0.72 0.50 - 1.40 mg/dL    Calcium 8.6 8.5 - 10.5 mg/dL     Anion Gap 9.0 0.0 - 11.9   MAGNESIUM    Collection Time: 05/02/19 12:11 AM   Result Value Ref Range    Magnesium 1.4 (L) 1.5 - 2.5 mg/dL   ESTIMATED GFR    Collection Time: 05/02/19 12:11 AM   Result Value Ref Range    GFR If African American >60 >60 mL/min/1.73 m 2    GFR If Non African American >60 >60 mL/min/1.73 m 2   ACCU-CHEK GLUCOSE    Collection Time: 05/02/19  1:33 AM   Result Value Ref Range    Glucose - Accu-Ck 112 (H) 65 - 99 mg/dL   ACCU-CHEK GLUCOSE    Collection Time: 05/02/19  8:13 AM   Result Value Ref Range    Glucose - Accu-Ck 93 65 - 99 mg/dL   ACCU-CHEK GLUCOSE    Collection Time: 05/02/19  2:21 PM   Result Value Ref Range    Glucose - Accu-Ck 113 (H) 65 - 99 mg/dL   ACCU-CHEK GLUCOSE    Collection Time: 05/02/19  4:50 PM   Result Value Ref Range    Glucose - Accu-Ck 113 (H) 65 - 99 mg/dL   ACCU-CHEK GLUCOSE    Collection Time: 05/03/19  6:19 AM   Result Value Ref Range    Glucose - Accu-Ck 86 65 - 99 mg/dL      *ASSESSMENT/PLAN:   1.  Suicide Attempt, overdose on antidepressant and hypertension medication   - Legal hold extended. Patient with underlying Bipolar dso II and PTSD. This is her second OD with suicide intention this month, at high risk of danger to herself at this time.   -Please transfer pt to inpatient psychiatric hospital when medically cleared.    2. Alcohol Use Disorder, alcohol withdrawal   -- [facility] protocol.              3. Bipolar 2 Disorder /susbtance induced mood dso  -Start Seroquel 50mg PO daily for mood.

## 2019-05-03 NOTE — PROGRESS NOTES
At 1410 pt was upset regarding her belonging, pt was educated that they are placed with security and nurse will be notified.

## 2019-05-03 NOTE — PROGRESS NOTES
Hospital Medicine Daily Progress Note    Date of Service  5/3/2019    Chief Complaint  58 y.o. female admitted 4/29/2019 with suicide attempt, alcohol intoxication/alcohol withdrawal      Interval Problem Update  Feeling better, tolerating diet, abdominal pain improved, continue to be anxious. No fever or chills no vision changes.     Consultants/Specialty  Psychiatry    Code Status  Full code    Disposition  To psychiatric facility.   Clear for dc from IM.     Review of Systems  Review of Systems   Constitutional: Negative for fever.   HENT: Negative for congestion.    Eyes: Negative for blurred vision.   Respiratory: Negative for cough.    Cardiovascular: Negative for chest pain and orthopnea.   Gastrointestinal: Negative for heartburn.   Genitourinary: Negative for dysuria and frequency.   Musculoskeletal: Negative for myalgias and neck pain.   Skin: Negative for itching.   Neurological: Positive for tremors and weakness. Negative for dizziness and headaches.   Endo/Heme/Allergies: Does not bruise/bleed easily.        Physical Exam  Temp:  [36.1 °C (96.9 °F)-37 °C (98.6 °F)] 36.1 °C (96.9 °F)  Pulse:  [63-98] 85  Resp:  [16-18] 18  BP: (116-154)/(74-98) 116/76  SpO2:  [96 %-98 %] 97 %    Physical Exam   Constitutional: She is oriented to person, place, and time. She appears well-nourished. No distress.   HENT:   Head: Normocephalic and atraumatic.   Mouth/Throat: No oropharyngeal exudate.   Eyes: Conjunctivae are normal. No scleral icterus.   Neck: Normal range of motion. Neck supple.   Cardiovascular: Regular rhythm and normal heart sounds.  Exam reveals no friction rub.    Pulmonary/Chest: Effort normal and breath sounds normal. No respiratory distress. She has no wheezes.   Abdominal: Soft. Bowel sounds are normal. She exhibits no distension. There is no tenderness.   Musculoskeletal: Normal range of motion. She exhibits no tenderness.   Neurological: She is oriented to person, place, and time. She exhibits  normal muscle tone.   Skin: Skin is dry. No erythema.   Psychiatric: She has a normal mood and affect.   Nursing note and vitals reviewed.      Fluids    Intake/Output Summary (Last 24 hours) at 05/03/19 1510  Last data filed at 05/03/19 0900   Gross per 24 hour   Intake              200 ml   Output                0 ml   Net              200 ml       Laboratory  Recent Labs      05/01/19   0052   WBC  5.6   RBC  3.59*   HEMOGLOBIN  11.2*   HEMATOCRIT  34.7*   MCV  96.7   MCH  31.2   MCHC  32.3*   RDW  53.8*   PLATELETCT  487*   MPV  9.5     Recent Labs      05/01/19   0052  05/02/19   0011   SODIUM  140  141   POTASSIUM  3.2*  3.9   CHLORIDE  106  110   CO2  26  22   GLUCOSE  94  116*   BUN  6*  12   CREATININE  0.56  0.72   CALCIUM  8.6  8.6                   Imaging  No orders to display        Assessment/Plan  * Drug overdose, intentional (HCC)   Assessment & Plan    This patient presents with SI and intential drug overdose of prozac and lisinopril   IP psych consultation in the am   Legal hold  [facility]  Poison control case # [id number].  Ms. Marquis was here with a confirmed overdose of Unknown substance or drug; she has no other known overdoses.    Continue monitoring.       Hypokalemia- (present on admission)   Assessment & Plan    Resolved.   F/u Mg in am.      Bipolar 2 disorder (HCC)- (present on admission)   Assessment & Plan    History of, IP psych consulted     PTSD (post-traumatic stress disorder)- (present on admission)   Assessment & Plan    History of ip psych consultation     Suicidal ideation   Assessment & Plan    Continue sitter  Legal hold  Psych consult     Anemia   Assessment & Plan    Most likely chronic, continue monitoring, no need for blood transfusion at this time.  Stable.     Hypoglycemia   Assessment & Plan    Resolved. Tolerating diet better.      Alcohol intoxication (HCC)   Assessment & Plan    Continue CIWA protocol.   Resolved.       VTE prophylaxis: Heparin

## 2019-05-04 PROBLEM — N39.0 UTI (URINARY TRACT INFECTION): Status: ACTIVE | Noted: 2019-05-04

## 2019-05-04 LAB
APPEARANCE UR: ABNORMAL
BACTERIA #/AREA URNS HPF: ABNORMAL /HPF
BILIRUB UR QL STRIP.AUTO: NEGATIVE
COLOR UR: YELLOW
EPI CELLS #/AREA URNS HPF: ABNORMAL /HPF
GLUCOSE BLD-MCNC: 107 MG/DL (ref 65–99)
GLUCOSE BLD-MCNC: 97 MG/DL (ref 65–99)
GLUCOSE UR STRIP.AUTO-MCNC: NEGATIVE MG/DL
HYALINE CASTS #/AREA URNS LPF: ABNORMAL /LPF
KETONES UR STRIP.AUTO-MCNC: NEGATIVE MG/DL
LEUKOCYTE ESTERASE UR QL STRIP.AUTO: ABNORMAL
MAGNESIUM SERPL-MCNC: 1.4 MG/DL (ref 1.5–2.5)
MICRO URNS: ABNORMAL
NITRITE UR QL STRIP.AUTO: NEGATIVE
PH UR STRIP.AUTO: 6 [PH]
PROT UR QL STRIP: NEGATIVE MG/DL
RBC # URNS HPF: ABNORMAL /HPF
RBC UR QL AUTO: ABNORMAL
SP GR UR STRIP.AUTO: 1.02
UROBILINOGEN UR STRIP.AUTO-MCNC: 0.2 MG/DL
WBC #/AREA URNS HPF: ABNORMAL /HPF

## 2019-05-04 PROCEDURE — 700111 HCHG RX REV CODE 636 W/ 250 OVERRIDE (IP): Performed by: HOSPITALIST

## 2019-05-04 PROCEDURE — A9270 NON-COVERED ITEM OR SERVICE: HCPCS | Performed by: PSYCHIATRY & NEUROLOGY

## 2019-05-04 PROCEDURE — 82962 GLUCOSE BLOOD TEST: CPT

## 2019-05-04 PROCEDURE — A9270 NON-COVERED ITEM OR SERVICE: HCPCS | Performed by: HOSPITALIST

## 2019-05-04 PROCEDURE — 36415 COLL VENOUS BLD VENIPUNCTURE: CPT

## 2019-05-04 PROCEDURE — 700102 HCHG RX REV CODE 250 W/ 637 OVERRIDE(OP): Performed by: HOSPITALIST

## 2019-05-04 PROCEDURE — 99232 SBSQ HOSP IP/OBS MODERATE 35: CPT | Performed by: HOSPITALIST

## 2019-05-04 PROCEDURE — 81001 URINALYSIS AUTO W/SCOPE: CPT

## 2019-05-04 PROCEDURE — 770020 HCHG ROOM/CARE - TELE (206)

## 2019-05-04 PROCEDURE — 83735 ASSAY OF MAGNESIUM: CPT

## 2019-05-04 PROCEDURE — 700102 HCHG RX REV CODE 250 W/ 637 OVERRIDE(OP): Performed by: PSYCHIATRY & NEUROLOGY

## 2019-05-04 PROCEDURE — 700105 HCHG RX REV CODE 258: Performed by: HOSPITALIST

## 2019-05-04 PROCEDURE — 700105 HCHG RX REV CODE 258

## 2019-05-04 RX ORDER — PHENAZOPYRIDINE HYDROCHLORIDE 100 MG/1
100 TABLET, FILM COATED ORAL
Status: COMPLETED | OUTPATIENT
Start: 2019-05-04 | End: 2019-05-05

## 2019-05-04 RX ORDER — NYSTATIN 100000 U/G
CREAM TOPICAL 2 TIMES DAILY
Status: DISCONTINUED | OUTPATIENT
Start: 2019-05-04 | End: 2019-05-11 | Stop reason: HOSPADM

## 2019-05-04 RX ORDER — CHLORDIAZEPOXIDE HYDROCHLORIDE 25 MG/1
25 CAPSULE, GELATIN COATED ORAL DAILY
Status: COMPLETED | OUTPATIENT
Start: 2019-05-05 | End: 2019-05-06

## 2019-05-04 RX ORDER — MAGNESIUM SULFATE HEPTAHYDRATE 40 MG/ML
2 INJECTION, SOLUTION INTRAVENOUS ONCE
Status: DISCONTINUED | OUTPATIENT
Start: 2019-05-04 | End: 2019-05-04

## 2019-05-04 RX ORDER — MAGNESIUM SULFATE 1 G/100ML
1 INJECTION INTRAVENOUS ONCE
Status: COMPLETED | OUTPATIENT
Start: 2019-05-04 | End: 2019-05-04

## 2019-05-04 RX ORDER — SODIUM CHLORIDE 9 MG/ML
INJECTION, SOLUTION INTRAVENOUS
Status: COMPLETED
Start: 2019-05-04 | End: 2019-05-04

## 2019-05-04 RX ORDER — ACETAMINOPHEN 325 MG/1
650 TABLET ORAL EVERY 4 HOURS PRN
Status: DISCONTINUED | OUTPATIENT
Start: 2019-05-04 | End: 2019-05-11 | Stop reason: HOSPADM

## 2019-05-04 RX ADMIN — LORAZEPAM 1 MG: 1 TABLET ORAL at 15:56

## 2019-05-04 RX ADMIN — PHENAZOPYRIDINE HYDROCHLORIDE 100 MG: 100 TABLET ORAL at 17:40

## 2019-05-04 RX ADMIN — Medication 1 CAPSULE: at 05:00

## 2019-05-04 RX ADMIN — LISINOPRIL 10 MG: 10 TABLET ORAL at 05:00

## 2019-05-04 RX ADMIN — LORAZEPAM 1 MG: 1 TABLET ORAL at 08:58

## 2019-05-04 RX ADMIN — IBUPROFEN 400 MG: 800 TABLET, FILM COATED ORAL at 23:25

## 2019-05-04 RX ADMIN — LORAZEPAM 1 MG: 1 TABLET ORAL at 01:29

## 2019-05-04 RX ADMIN — PANCRELIPASE 3000 UNITS: 15000; 3000; 9500 CAPSULE, DELAYED RELEASE ORAL at 05:00

## 2019-05-04 RX ADMIN — HEPARIN SODIUM 5000 UNITS: 5000 INJECTION, SOLUTION INTRAVENOUS; SUBCUTANEOUS at 13:32

## 2019-05-04 RX ADMIN — PANCRELIPASE 3000 UNITS: 15000; 3000; 9500 CAPSULE, DELAYED RELEASE ORAL at 17:42

## 2019-05-04 RX ADMIN — ACETAMINOPHEN 650 MG: 325 TABLET, FILM COATED ORAL at 20:22

## 2019-05-04 RX ADMIN — OMEPRAZOLE 20 MG: 20 CAPSULE, DELAYED RELEASE ORAL at 05:00

## 2019-05-04 RX ADMIN — IBUPROFEN 400 MG: 800 TABLET, FILM COATED ORAL at 08:55

## 2019-05-04 RX ADMIN — LISINOPRIL 10 MG: 10 TABLET ORAL at 17:43

## 2019-05-04 RX ADMIN — IBUPROFEN 400 MG: 800 TABLET, FILM COATED ORAL at 17:40

## 2019-05-04 RX ADMIN — PANCRELIPASE 3000 UNITS: 15000; 3000; 9500 CAPSULE, DELAYED RELEASE ORAL at 11:25

## 2019-05-04 RX ADMIN — ACETAMINOPHEN 650 MG: 325 TABLET, FILM COATED ORAL at 13:19

## 2019-05-04 RX ADMIN — LORAZEPAM 1 MG: 1 TABLET ORAL at 22:15

## 2019-05-04 RX ADMIN — HEPARIN SODIUM 5000 UNITS: 5000 INJECTION, SOLUTION INTRAVENOUS; SUBCUTANEOUS at 05:03

## 2019-05-04 RX ADMIN — IBUPROFEN 400 MG: 800 TABLET, FILM COATED ORAL at 01:29

## 2019-05-04 RX ADMIN — HEPARIN SODIUM 5000 UNITS: 5000 INJECTION, SOLUTION INTRAVENOUS; SUBCUTANEOUS at 22:15

## 2019-05-04 RX ADMIN — NYSTATIN: 100000 CREAM TOPICAL at 11:26

## 2019-05-04 RX ADMIN — NYSTATIN: 100000 CREAM TOPICAL at 17:47

## 2019-05-04 RX ADMIN — QUETIAPINE FUMARATE 50 MG: 25 TABLET ORAL at 22:15

## 2019-05-04 RX ADMIN — SODIUM CHLORIDE 10 ML: 9 INJECTION, SOLUTION INTRAVENOUS at 05:00

## 2019-05-04 RX ADMIN — CEFTRIAXONE SODIUM 1 G: 1 INJECTION, POWDER, FOR SOLUTION INTRAMUSCULAR; INTRAVENOUS at 06:25

## 2019-05-04 RX ADMIN — CHLORDIAZEPOXIDE HYDROCHLORIDE 25 MG: 25 CAPSULE ORAL at 04:59

## 2019-05-04 RX ADMIN — MAGNESIUM SULFATE IN DEXTROSE 1 G: 10 INJECTION, SOLUTION INTRAVENOUS at 05:07

## 2019-05-04 ASSESSMENT — ENCOUNTER SYMPTOMS
CHILLS: 0
COUGH: 0
MYALGIAS: 0
SINUS PAIN: 0
PALPITATIONS: 0
DOUBLE VISION: 0
BRUISES/BLEEDS EASILY: 0
NAUSEA: 0
WEAKNESS: 1
BACK PAIN: 0
DIZZINESS: 0
TREMORS: 1

## 2019-05-04 NOTE — PROGRESS NOTES
Dr. Rodrigez updated on patient's magnesium of 1.4. Dr. Rodrigez also updated on patient's UA results. Dr. Rodrigez to put in additional orders.

## 2019-05-04 NOTE — PROGRESS NOTES
Dr. Rodrigez updated on patient's burning sensation in the vaginal area. Dr. Rodrigez to put in additional orders.

## 2019-05-04 NOTE — PROGRESS NOTES
[facility] Medicine Daily Progress Note    Date of Service  5/4/2019    Chief Complaint  58 y.o. female admitted 4/29/2019 with suicide attempt, alcohol intoxication/alcohol withdrawal      Interval Problem Update  Patient is resting in bed, she is complaining of burning to pee, she is also complaining of pain in the vaginal area and redness, no vaginal discharge, no fever chills, she is tolerating diet, she is alert oriented and follows commands.      Consultants/Specialty  Psychiatry    Code Status  Full code    Disposition  To psychiatric facility.   Clear for dc from IM.     Review of Systems  Review of Systems   Constitutional: Negative for chills.   HENT: Negative for nosebleeds and sinus pain.    Eyes: Negative for double vision.   Respiratory: Negative for cough.    Cardiovascular: Negative for chest pain and palpitations.   Gastrointestinal: Negative for nausea.   Genitourinary: Negative for dysuria and urgency.   Musculoskeletal: Negative for back pain and myalgias.   Skin: Negative for itching.   Neurological: Positive for tremors and weakness. Negative for dizziness.   Endo/Heme/Allergies: Does not bruise/bleed easily.        Physical Exam  Temp:  [36.2 °C (97.2 °F)-36.9 °C (98.4 °F)] 36.2 °C (97.2 °F)  Pulse:  [79-91] 83  Resp:  [16-18] 16  BP: (115-145)/(64-81) 115/64  SpO2:  [93 %-97 %] 95 %    Physical Exam   Constitutional: She is oriented to person, place, and time. She appears well-nourished. No distress.   HENT:   Head: Normocephalic and atraumatic.   Mouth/Throat: No oropharyngeal exudate.   Eyes: Conjunctivae are normal.   Neck: Normal range of motion. Neck supple.   Cardiovascular: Regular rhythm and normal heart sounds.  Exam reveals no friction rub.    Pulmonary/Chest: Effort normal and breath sounds normal. No respiratory distress. She has no rales.   Abdominal: Soft. Bowel sounds are normal. She exhibits no distension. There is no rebound.   Musculoskeletal: Normal range of motion. She  exhibits no tenderness.   Lymphadenopathy:     She has no cervical adenopathy.   Neurological: She is oriented to person, place, and time. She exhibits normal muscle tone.   Skin: Skin is warm and dry.   Psychiatric: She has a normal mood and affect.   Nursing note and vitals reviewed.      Fluids    Intake/Output Summary (Last 24 hours) at 05/04/19 1322  Last data filed at 05/04/19 1100   Gross per 24 hour   Intake              100 ml   Output                0 ml   Net              100 ml       Laboratory      Recent Labs      05/02/19   0011   SODIUM  141   POTASSIUM  3.9   CHLORIDE  110   CO2  22   GLUCOSE  116*   BUN  12   CREATININE  0.72   CALCIUM  8.6                   Imaging  No orders to display        Assessment/Plan  * Drug overdose, intentional (HCC)   Assessment & Plan    This patient presents with SI and intential drug overdose of prozac and lisinopril   IP psych consultation in the am   Legal hold  [facility]  Poison control case # [id number].  Ms. Marquis was here with a confirmed overdose of Unknown substance or drug; she has no other known overdoses.    Stable, alert oriented follows commands.     Hypokalemia- (present on admission)   Assessment & Plan    Resolved.   Magnesium still low replacing today.      Bipolar 2 disorder (HCC)- (present on admission)   Assessment & Plan    History of, IP psych consulted     PTSD (post-traumatic stress disorder)- (present on admission)   Assessment & Plan    History of ip psych consultation     UTI (urinary tract infection)   Assessment & Plan    Started on IV antibiotics, follow-up cultures.     Suicidal ideation   Assessment & Plan    Continue sitter  Legal hold  Psych consult     Anemia   Assessment & Plan    Most likely chronic, continue monitoring, no need for blood transfusion at this time.  Stable.     Hypoglycemia   Assessment & Plan    Resolved. Tolerating diet better.      Alcohol intoxication (HCC)   Assessment & Plan    Continue CIWA protocol.   Resolved.        VTE prophylaxis: Heparin

## 2019-05-04 NOTE — PROGRESS NOTES
Report received from previous nurse. Patient encouraged to call for assistance. Call light within reach. Bed locked and in lowest position. 1:1 sitter at bedside.

## 2019-05-04 NOTE — CARE PLAN
Problem: Communication  Goal: The ability to communicate needs accurately and effectively will improve  Outcome: PROGRESSING AS EXPECTED    Intervention: [place] patient and significant other/support system to call light to alert staff of needs   05/03/19 4047   OTHER   Oriented to: All of the Following : Location of Bathroom, Visiting Policy, Unit Routine, Call Light and Bedside Controls, Bedside Rail Policy, Smoking Policy, Rights and Responsibilities, Bedside Report, and Patient Education Notebook         Problem: Safety  Goal: Will remain free from injury  Outcome: PROGRESSING AS EXPECTED  1:1 sitter at bedside. Hourly rounding in place. Bed locked and in lowest position.

## 2019-05-05 LAB
ANION GAP SERPL CALC-SCNC: 8 MMOL/L (ref 0–11.9)
BUN SERPL-MCNC: 17 MG/DL (ref 8–22)
CALCIUM SERPL-MCNC: 9.3 MG/DL (ref 8.5–10.5)
CHLORIDE SERPL-SCNC: 109 MMOL/L (ref 96–112)
CO2 SERPL-SCNC: 24 MMOL/L (ref 20–33)
CREAT SERPL-MCNC: 0.51 MG/DL (ref 0.5–1.4)
GLUCOSE SERPL-MCNC: 97 MG/DL (ref 65–99)
MAGNESIUM SERPL-MCNC: 1.5 MG/DL (ref 1.5–2.5)
POTASSIUM SERPL-SCNC: 3.8 MMOL/L (ref 3.6–5.5)
SODIUM SERPL-SCNC: 141 MMOL/L (ref 135–145)

## 2019-05-05 PROCEDURE — 700111 HCHG RX REV CODE 636 W/ 250 OVERRIDE (IP): Performed by: HOSPITALIST

## 2019-05-05 PROCEDURE — 700102 HCHG RX REV CODE 250 W/ 637 OVERRIDE(OP): Performed by: HOSPITALIST

## 2019-05-05 PROCEDURE — 80048 BASIC METABOLIC PNL TOTAL CA: CPT

## 2019-05-05 PROCEDURE — A9270 NON-COVERED ITEM OR SERVICE: HCPCS | Performed by: HOSPITALIST

## 2019-05-05 PROCEDURE — A9270 NON-COVERED ITEM OR SERVICE: HCPCS | Performed by: PSYCHIATRY & NEUROLOGY

## 2019-05-05 PROCEDURE — 700102 HCHG RX REV CODE 250 W/ 637 OVERRIDE(OP): Performed by: PSYCHIATRY & NEUROLOGY

## 2019-05-05 PROCEDURE — 83735 ASSAY OF MAGNESIUM: CPT

## 2019-05-05 PROCEDURE — 700105 HCHG RX REV CODE 258: Performed by: HOSPITALIST

## 2019-05-05 PROCEDURE — 700105 HCHG RX REV CODE 258

## 2019-05-05 PROCEDURE — 99232 SBSQ HOSP IP/OBS MODERATE 35: CPT | Performed by: HOSPITALIST

## 2019-05-05 PROCEDURE — 36415 COLL VENOUS BLD VENIPUNCTURE: CPT

## 2019-05-05 PROCEDURE — 770020 HCHG ROOM/CARE - TELE (206)

## 2019-05-05 RX ORDER — DOXEPIN HYDROCHLORIDE 10 MG/1
10 CAPSULE ORAL EVERY EVENING
Status: DISCONTINUED | OUTPATIENT
Start: 2019-05-05 | End: 2019-05-11 | Stop reason: HOSPADM

## 2019-05-05 RX ORDER — SODIUM CHLORIDE 9 MG/ML
INJECTION, SOLUTION INTRAVENOUS
Status: COMPLETED
Start: 2019-05-05 | End: 2019-05-05

## 2019-05-05 RX ORDER — DOXEPIN HYDROCHLORIDE 10 MG/1
10 CAPSULE ORAL EVERY EVENING
Status: DISCONTINUED | OUTPATIENT
Start: 2019-05-05 | End: 2019-05-05

## 2019-05-05 RX ADMIN — OMEPRAZOLE 20 MG: 20 CAPSULE, DELAYED RELEASE ORAL at 05:28

## 2019-05-05 RX ADMIN — ACETAMINOPHEN 650 MG: 325 TABLET, FILM COATED ORAL at 16:55

## 2019-05-05 RX ADMIN — ACETAMINOPHEN 650 MG: 325 TABLET, FILM COATED ORAL at 12:44

## 2019-05-05 RX ADMIN — PANCRELIPASE 3000 UNITS: 15000; 3000; 9500 CAPSULE, DELAYED RELEASE ORAL at 12:44

## 2019-05-05 RX ADMIN — MAGNESIUM OXIDE TAB 400 MG (241.3 MG ELEMENTAL MG) 400 MG: 400 (241.3 MG) TAB at 09:37

## 2019-05-05 RX ADMIN — PHENAZOPYRIDINE HYDROCHLORIDE 100 MG: 100 TABLET ORAL at 12:44

## 2019-05-05 RX ADMIN — SENNOSIDES AND DOCUSATE SODIUM 2 TABLET: 8.6; 5 TABLET ORAL at 16:55

## 2019-05-05 RX ADMIN — QUETIAPINE FUMARATE 50 MG: 25 TABLET ORAL at 22:21

## 2019-05-05 RX ADMIN — CHLORDIAZEPOXIDE HYDROCHLORIDE 25 MG: 25 CAPSULE ORAL at 05:27

## 2019-05-05 RX ADMIN — LISINOPRIL 10 MG: 10 TABLET ORAL at 16:55

## 2019-05-05 RX ADMIN — Medication 1 CAPSULE: at 05:28

## 2019-05-05 RX ADMIN — PHENAZOPYRIDINE HYDROCHLORIDE 100 MG: 100 TABLET ORAL at 05:27

## 2019-05-05 RX ADMIN — NYSTATIN: 100000 CREAM TOPICAL at 05:42

## 2019-05-05 RX ADMIN — IBUPROFEN 400 MG: 800 TABLET, FILM COATED ORAL at 05:31

## 2019-05-05 RX ADMIN — LORAZEPAM 1 MG: 1 TABLET ORAL at 18:34

## 2019-05-05 RX ADMIN — HEPARIN SODIUM 5000 UNITS: 5000 INJECTION, SOLUTION INTRAVENOUS; SUBCUTANEOUS at 05:42

## 2019-05-05 RX ADMIN — DOXEPIN HYDROCHLORIDE 10 MG: 10 CAPSULE ORAL at 16:57

## 2019-05-05 RX ADMIN — LISINOPRIL 10 MG: 10 TABLET ORAL at 05:28

## 2019-05-05 RX ADMIN — MAGNESIUM OXIDE TAB 400 MG (241.3 MG ELEMENTAL MG) 400 MG: 400 (241.3 MG) TAB at 16:55

## 2019-05-05 RX ADMIN — SODIUM CHLORIDE: 9 INJECTION, SOLUTION INTRAVENOUS at 10:00

## 2019-05-05 RX ADMIN — HEPARIN SODIUM 5000 UNITS: 5000 INJECTION, SOLUTION INTRAVENOUS; SUBCUTANEOUS at 22:21

## 2019-05-05 RX ADMIN — NYSTATIN: 100000 CREAM TOPICAL at 16:56

## 2019-05-05 RX ADMIN — PANCRELIPASE 3000 UNITS: 15000; 3000; 9500 CAPSULE, DELAYED RELEASE ORAL at 16:57

## 2019-05-05 RX ADMIN — CEFTRIAXONE SODIUM 1 G: 1 INJECTION, POWDER, FOR SOLUTION INTRAMUSCULAR; INTRAVENOUS at 09:58

## 2019-05-05 RX ADMIN — IBUPROFEN 400 MG: 800 TABLET, FILM COATED ORAL at 12:44

## 2019-05-05 RX ADMIN — IBUPROFEN 400 MG: 800 TABLET, FILM COATED ORAL at 18:34

## 2019-05-05 RX ADMIN — PANCRELIPASE 3000 UNITS: 15000; 3000; 9500 CAPSULE, DELAYED RELEASE ORAL at 05:27

## 2019-05-05 RX ADMIN — LORAZEPAM 1 MG: 1 TABLET ORAL at 05:27

## 2019-05-05 RX ADMIN — HEPARIN SODIUM 5000 UNITS: 5000 INJECTION, SOLUTION INTRAVENOUS; SUBCUTANEOUS at 13:39

## 2019-05-05 RX ADMIN — LORAZEPAM 1 MG: 1 TABLET ORAL at 12:44

## 2019-05-05 ASSESSMENT — ENCOUNTER SYMPTOMS
TREMORS: 1
DOUBLE VISION: 0
SINUS PAIN: 0
NAUSEA: 0
PALPITATIONS: 0
CHILLS: 0
MYALGIAS: 0
DIZZINESS: 0
BRUISES/BLEEDS EASILY: 0
WEAKNESS: 1
COUGH: 0
BACK PAIN: 0

## 2019-05-05 NOTE — DISCHARGE PLANNING
Anticipated Discharge Disposition: Inpatient psych facility     Action: MD completed medical clearance on legal hold. LSW requested [facility] send out referrals.     Barriers to Discharge: Acceptance    Plan: F/U with referrals

## 2019-05-05 NOTE — CARE PLAN
Problem: Safety  Goal: Will remain free from injury  Outcome: PROGRESSING AS EXPECTED  1:1 in place. Unsafe objects removed from room.

## 2019-05-05 NOTE — PROGRESS NOTES
Hospital Medicine Daily Progress Note    Date of Service  5/5/2019    Chief Complaint  58 y.o. female admitted 4/29/2019 with suicide attempt, alcohol intoxication/alcohol withdrawal      Interval Problem Update  Patient is resting in bed, no new complaints, she is alert oriented follows commands, she is states that she is on doxepin at night as a sleep aid and that she is not able to tolerate Ambien or trazodone, will start low-dose doxepin since patient is said that she is not been able to sleep.  No abdominal pain, dysuria is improved      Consultants/Specialty  Psychiatry    Code Status  Full code    Disposition  To psychiatric facility.   Cleared for dc from .     Review of Systems  Review of Systems   Constitutional: Negative for chills.   HENT: Negative for nosebleeds and sinus pain.    Eyes: Negative for double vision.   Respiratory: Negative for cough.    Cardiovascular: Negative for chest pain and palpitations.   Gastrointestinal: Negative for nausea.   Genitourinary: Negative for dysuria and urgency.   Musculoskeletal: Negative for back pain and myalgias.   Skin: Negative for itching.   Neurological: Positive for tremors and weakness. Negative for dizziness.   Endo/Heme/Allergies: Does not bruise/bleed easily.        Physical Exam  Temp:  [35.9 °C (96.6 °F)-36.3 °C (97.3 °F)] 36.2 °C (97.2 °F)  Pulse:  [76-89] 85  Resp:  [16-18] 16  BP: (121-137)/(67-83) 122/67  SpO2:  [93 %-96 %] 95 %    Physical Exam   Constitutional: She is oriented to person, place, and time. She appears well-nourished. No distress.   HENT:   Head: Normocephalic and atraumatic.   Mouth/Throat: No oropharyngeal exudate.   Eyes: Conjunctivae are normal.   Neck: Normal range of motion. Neck supple.   Cardiovascular: Regular rhythm and normal heart sounds.  Exam reveals no friction rub.    Pulmonary/Chest: Effort normal and breath sounds normal. No respiratory distress. She has no rales.   Abdominal: Soft. Bowel sounds are normal. She  exhibits no distension. There is no rebound.   Musculoskeletal: Normal range of motion. She exhibits no tenderness.   Lymphadenopathy:     She has no cervical adenopathy.   Neurological: She is oriented to person, place, and time. She exhibits normal muscle tone.   Skin: Skin is warm and dry.   Psychiatric: She has a normal mood and affect.   Nursing note and vitals reviewed.      Fluids    Intake/Output Summary (Last 24 hours) at 05/05/19 1327  Last data filed at 05/05/19 0900   Gross per 24 hour   Intake              400 ml   Output                0 ml   Net              400 ml       Laboratory      Recent Labs      05/05/19   0157   SODIUM  141   POTASSIUM  3.8   CHLORIDE  109   CO2  24   GLUCOSE  97   BUN  17   CREATININE  0.51   CALCIUM  9.3                   Imaging  No orders to display        Assessment/Plan  * Drug overdose, intentional (HCC)   Assessment & Plan    This patient presents with SI and intential drug overdose of prozac and lisinopril   IP psych consultation in the am   Legal hold  [facility]  Poison control case # [id number].  Ms. Marquis was here with a confirmed overdose of Unknown substance or drug; she has no other known overdoses.    Stable, alert oriented follows commands.     Hypokalemia- (present on admission)   Assessment & Plan    Resolved.   Hypomagnesemia improved, start p.o. supplement     Bipolar 2 disorder (HCC)- (present on admission)   Assessment & Plan    History of, IP psych consulted     PTSD (post-traumatic stress disorder)- (present on admission)   Assessment & Plan    History of ip psych consultation     UTI (urinary tract infection)   Assessment & Plan    Started on IV antibiotics, follow-up cultures, dysuria improving     Suicidal ideation   Assessment & Plan    Continue sitter  Legal hold  Psych consult     Anemia   Assessment & Plan    Most likely chronic, continue monitoring, no need for blood transfusion at this time.  Stable.     Hypoglycemia   Assessment & Plan     Resolved. Tolerating diet better.      Alcohol intoxication (HCC)   Assessment & Plan    Continue CIWA protocol.   Resolved.       VTE prophylaxis: Heparin

## 2019-05-05 NOTE — DISCHARGE PLANNING
[facility] sent out medical clearance and referrals to all appropriate inpatient mental health facilities.     KEVIN Allen notified

## 2019-05-05 NOTE — PROGRESS NOTES
During morning med pass, I addressed the pts lack of peripheral access. She initially refused her IV antibiotics but after educated was agreeable to allowing someone to start an IV. An US guided IV was attempted, still unable to obtain access. I discussed with the pt the importance of leaving an IV in while we have access. I also educated about the difference between PO and IV antibiotics during this discussion.     Pt currently does not have IV access. Will address with dayshift team.

## 2019-05-05 NOTE — PROGRESS NOTES
Pt A&Ox4. Pt stable. 1:1 present. Pt pleasant this morning and willing to have new IV placed for IV abx.

## 2019-05-05 NOTE — PROGRESS NOTES
Assumed care, pt oriented x4. Denies pain. Discussed safety and bed is locked in lowest position and call light/personal belongings are within reach.

## 2019-05-06 PROCEDURE — A9270 NON-COVERED ITEM OR SERVICE: HCPCS | Performed by: PSYCHIATRY & NEUROLOGY

## 2019-05-06 PROCEDURE — 99232 SBSQ HOSP IP/OBS MODERATE 35: CPT | Performed by: HOSPITALIST

## 2019-05-06 PROCEDURE — 700111 HCHG RX REV CODE 636 W/ 250 OVERRIDE (IP): Performed by: HOSPITALIST

## 2019-05-06 PROCEDURE — 700105 HCHG RX REV CODE 258: Performed by: HOSPITALIST

## 2019-05-06 PROCEDURE — A9270 NON-COVERED ITEM OR SERVICE: HCPCS | Performed by: HOSPITALIST

## 2019-05-06 PROCEDURE — 700102 HCHG RX REV CODE 250 W/ 637 OVERRIDE(OP): Performed by: HOSPITALIST

## 2019-05-06 PROCEDURE — 700102 HCHG RX REV CODE 250 W/ 637 OVERRIDE(OP): Performed by: PSYCHIATRY & NEUROLOGY

## 2019-05-06 PROCEDURE — 770001 HCHG ROOM/CARE - MED/SURG/GYN PRIV*

## 2019-05-06 RX ORDER — TRAMADOL HYDROCHLORIDE 50 MG/1
50 TABLET ORAL EVERY 6 HOURS PRN
Status: DISCONTINUED | OUTPATIENT
Start: 2019-05-06 | End: 2019-05-07

## 2019-05-06 RX ORDER — MORPHINE SULFATE 4 MG/ML
2 INJECTION, SOLUTION INTRAMUSCULAR; INTRAVENOUS ONCE
Status: COMPLETED | OUTPATIENT
Start: 2019-05-06 | End: 2019-05-06

## 2019-05-06 RX ADMIN — MAGNESIUM OXIDE TAB 400 MG (241.3 MG ELEMENTAL MG) 400 MG: 400 (241.3 MG) TAB at 05:59

## 2019-05-06 RX ADMIN — MORPHINE SULFATE 2 MG: 4 INJECTION INTRAVENOUS at 21:40

## 2019-05-06 RX ADMIN — OMEPRAZOLE 20 MG: 20 CAPSULE, DELAYED RELEASE ORAL at 05:59

## 2019-05-06 RX ADMIN — ACETAMINOPHEN 650 MG: 325 TABLET, FILM COATED ORAL at 00:23

## 2019-05-06 RX ADMIN — PANCRELIPASE 3000 UNITS: 15000; 3000; 9500 CAPSULE, DELAYED RELEASE ORAL at 05:58

## 2019-05-06 RX ADMIN — PANCRELIPASE 3000 UNITS: 15000; 3000; 9500 CAPSULE, DELAYED RELEASE ORAL at 11:14

## 2019-05-06 RX ADMIN — QUETIAPINE FUMARATE 50 MG: 25 TABLET ORAL at 21:40

## 2019-05-06 RX ADMIN — HEPARIN SODIUM 5000 UNITS: 5000 INJECTION, SOLUTION INTRAVENOUS; SUBCUTANEOUS at 05:59

## 2019-05-06 RX ADMIN — SENNOSIDES AND DOCUSATE SODIUM 2 TABLET: 8.6; 5 TABLET ORAL at 17:22

## 2019-05-06 RX ADMIN — LISINOPRIL 10 MG: 10 TABLET ORAL at 17:22

## 2019-05-06 RX ADMIN — MAGNESIUM OXIDE TAB 400 MG (241.3 MG ELEMENTAL MG) 400 MG: 400 (241.3 MG) TAB at 17:22

## 2019-05-06 RX ADMIN — IBUPROFEN 400 MG: 800 TABLET, FILM COATED ORAL at 06:09

## 2019-05-06 RX ADMIN — CEFTRIAXONE SODIUM 1 G: 1 INJECTION, POWDER, FOR SOLUTION INTRAMUSCULAR; INTRAVENOUS at 05:59

## 2019-05-06 RX ADMIN — NYSTATIN: 100000 CREAM TOPICAL at 05:59

## 2019-05-06 RX ADMIN — ACETAMINOPHEN 650 MG: 325 TABLET, FILM COATED ORAL at 13:09

## 2019-05-06 RX ADMIN — CHLORDIAZEPOXIDE HYDROCHLORIDE 25 MG: 25 CAPSULE ORAL at 06:00

## 2019-05-06 RX ADMIN — TRAMADOL HYDROCHLORIDE 50 MG: 50 TABLET, FILM COATED ORAL at 17:45

## 2019-05-06 RX ADMIN — IBUPROFEN 400 MG: 800 TABLET, FILM COATED ORAL at 21:41

## 2019-05-06 RX ADMIN — DOXEPIN HYDROCHLORIDE 10 MG: 10 CAPSULE ORAL at 21:40

## 2019-05-06 RX ADMIN — NYSTATIN: 100000 CREAM TOPICAL at 18:00

## 2019-05-06 RX ADMIN — LISINOPRIL 10 MG: 10 TABLET ORAL at 05:59

## 2019-05-06 RX ADMIN — LORAZEPAM 1 MG: 1 TABLET ORAL at 21:41

## 2019-05-06 RX ADMIN — Medication 1 CAPSULE: at 05:59

## 2019-05-06 RX ADMIN — IBUPROFEN 400 MG: 800 TABLET, FILM COATED ORAL at 13:10

## 2019-05-06 RX ADMIN — HEPARIN SODIUM 5000 UNITS: 5000 INJECTION, SOLUTION INTRAVENOUS; SUBCUTANEOUS at 13:09

## 2019-05-06 RX ADMIN — PANCRELIPASE 3000 UNITS: 15000; 3000; 9500 CAPSULE, DELAYED RELEASE ORAL at 17:22

## 2019-05-06 RX ADMIN — LORAZEPAM 1 MG: 1 TABLET ORAL at 13:09

## 2019-05-06 ASSESSMENT — ENCOUNTER SYMPTOMS
WEAKNESS: 1
MYALGIAS: 0
DIZZINESS: 0
ORTHOPNEA: 0
COUGH: 0
BLURRED VISION: 0
TREMORS: 0
FEVER: 0
BRUISES/BLEEDS EASILY: 0
BACK PAIN: 0
NAUSEA: 0
SINUS PAIN: 0

## 2019-05-06 NOTE — PROGRESS NOTES
[facility] Medicine Daily Progress Note    Date of Service  5/6/2019    Chief Complaint  58 y.o. female admitted 4/29/2019 with suicide attempt, alcohol intoxication/alcohol withdrawal      Interval Problem Update  Patient resting in bed, no new complains, not in distress, she is resting in bed, when asked about pain she states that she had pain all over but she does not look in distress and she is resting comfortable, she was asked to ambulate more and to drink plenty of fluids.       Consultants/Specialty  Psychiatry    Code Status  Full code    Disposition  To psychiatric facility.   Cleared for dc from .     Review of Systems  Review of Systems   Constitutional: Negative for fever.   HENT: Negative for nosebleeds and sinus pain.    Eyes: Negative for blurred vision.   Respiratory: Negative for cough.    Cardiovascular: Negative for chest pain and orthopnea.   Gastrointestinal: Negative for nausea.   Genitourinary: Negative for dysuria and frequency.   Musculoskeletal: Negative for back pain and myalgias.   Skin: Negative for itching.   Neurological: Positive for weakness. Negative for dizziness and tremors.   Endo/Heme/Allergies: Does not bruise/bleed easily.        Physical Exam  Temp:  [36.1 °C (97 °F)-36.5 °C (97.7 °F)] 36.1 °C (97 °F)  Pulse:  [78-98] 84  Resp:  [17-18] 18  BP: (118-131)/(70-86) 131/85  SpO2:  [94 %-96 %] 96 %    Physical Exam   Constitutional: She is oriented to person, place, and time. She appears well-nourished. No distress.   HENT:   Head: Normocephalic and atraumatic.   Mouth/Throat: No oropharyngeal exudate.   Eyes: Conjunctivae are normal.   Neck: Normal range of motion. Neck supple.   Cardiovascular: Regular rhythm and normal heart sounds.  Exam reveals no friction rub.    Pulmonary/Chest: Effort normal and breath sounds normal. No respiratory distress. She has no wheezes.   Abdominal: Soft. Bowel sounds are normal. She exhibits no distension. There is no tenderness.    Musculoskeletal: Normal range of motion. She exhibits no tenderness.   Neurological: She is oriented to person, place, and time. She exhibits normal muscle tone.   Skin: Skin is warm and dry.   Psychiatric: She has a normal mood and affect.   Nursing note and vitals reviewed.      Fluids    Intake/Output Summary (Last 24 hours) at 05/06/19 1612  Last data filed at 05/05/19 2015   Gross per 24 hour   Intake              200 ml   Output                0 ml   Net              200 ml       Laboratory      Recent Labs      05/05/19   0157   SODIUM  141   POTASSIUM  3.8   CHLORIDE  109   CO2  24   GLUCOSE  97   BUN  17   CREATININE  0.51   CALCIUM  9.3                   Imaging  No orders to display        Assessment/Plan  * Drug overdose, intentional (HCC)   Assessment & Plan    This patient presents with SI and intential drug overdose of prozac and lisinopril   IP psych consultation in the am   Legal hold  [facility]  Poison control case # [id number].  Ms. Marquis was here with a confirmed overdose of Unknown substance or drug; she has no other known overdoses.    Resolved.      Hypokalemia- (present on admission)   Assessment & Plan    Resolved.   Hypomagnesemia improved, started p.o. Supplement  Will f/u in am.      Bipolar 2 disorder (HCC)- (present on admission)   Assessment & Plan    History of, IP psych consulted     PTSD (post-traumatic stress disorder)- (present on admission)   Assessment & Plan    History of ip psych consultation     UTI (urinary tract infection)   Assessment & Plan    Completed ceftriaxone.      Suicidal ideation   Assessment & Plan    Continue sitter  Legal hold  Psych consult     Anemia   Assessment & Plan    Most likely chronic, continue monitoring, no need for blood transfusion at this time.  Stable.     Hypoglycemia   Assessment & Plan    Resolved. Tolerating diet better.      Alcohol intoxication (HCC)   Assessment & Plan       Resolved.       VTE prophylaxis: Heparin

## 2019-05-06 NOTE — PROGRESS NOTES
Assumed care, pt oriented x4. Denies pain. Discussed safety and bed is locked in lowest position and call light/personal belongings are within reach.    1:1 sitter at bedside

## 2019-05-06 NOTE — CARE PLAN
Problem: Safety  Goal: Will remain free from injury  Outcome: PROGRESSING AS EXPECTED  1:1 at bedside. Pt understands importance of communicating with 1:1.

## 2019-05-07 ENCOUNTER — APPOINTMENT (OUTPATIENT)
Dept: RADIOLOGY | Facility: MEDICAL CENTER | Age: 59
DRG: 918 | End: 2019-05-07
Attending: INTERNAL MEDICINE
Payer: MEDICAID

## 2019-05-07 LAB
ALBUMIN SERPL BCP-MCNC: 3.4 G/DL (ref 3.2–4.9)
ALBUMIN/GLOB SERPL: 1.1 G/DL
ALP SERPL-CCNC: 153 U/L (ref 30–99)
ALT SERPL-CCNC: 72 U/L (ref 2–50)
ANION GAP SERPL CALC-SCNC: 7 MMOL/L (ref 0–11.9)
ANION GAP SERPL CALC-SCNC: 8 MMOL/L (ref 0–11.9)
AST SERPL-CCNC: 74 U/L (ref 12–45)
BASOPHILS # BLD AUTO: 0.8 % (ref 0–1.8)
BASOPHILS # BLD: 0.06 K/UL (ref 0–0.12)
BILIRUB SERPL-MCNC: 0.2 MG/DL (ref 0.1–1.5)
BUN SERPL-MCNC: 17 MG/DL (ref 8–22)
BUN SERPL-MCNC: 17 MG/DL (ref 8–22)
CALCIUM SERPL-MCNC: 9.2 MG/DL (ref 8.5–10.5)
CALCIUM SERPL-MCNC: 9.5 MG/DL (ref 8.5–10.5)
CHLORIDE SERPL-SCNC: 107 MMOL/L (ref 96–112)
CHLORIDE SERPL-SCNC: 108 MMOL/L (ref 96–112)
CO2 SERPL-SCNC: 25 MMOL/L (ref 20–33)
CO2 SERPL-SCNC: 26 MMOL/L (ref 20–33)
CREAT SERPL-MCNC: 0.6 MG/DL (ref 0.5–1.4)
CREAT SERPL-MCNC: 0.77 MG/DL (ref 0.5–1.4)
EOSINOPHIL # BLD AUTO: 0.18 K/UL (ref 0–0.51)
EOSINOPHIL NFR BLD: 2.4 % (ref 0–6.9)
ERYTHROCYTE [DISTWIDTH] IN BLOOD BY AUTOMATED COUNT: 57.4 FL (ref 35.9–50)
GLOBULIN SER CALC-MCNC: 3.2 G/DL (ref 1.9–3.5)
GLUCOSE SERPL-MCNC: 114 MG/DL (ref 65–99)
GLUCOSE SERPL-MCNC: 98 MG/DL (ref 65–99)
HCT VFR BLD AUTO: 36.4 % (ref 37–47)
HGB BLD-MCNC: 11.3 G/DL (ref 12–16)
IMM GRANULOCYTES # BLD AUTO: 0.04 K/UL (ref 0–0.11)
IMM GRANULOCYTES NFR BLD AUTO: 0.5 % (ref 0–0.9)
LIPASE SERPL-CCNC: 55 U/L (ref 11–82)
LYMPHOCYTES # BLD AUTO: 1.44 K/UL (ref 1–4.8)
LYMPHOCYTES NFR BLD: 18.9 % (ref 22–41)
MAGNESIUM SERPL-MCNC: 1.6 MG/DL (ref 1.5–2.5)
MCH RBC QN AUTO: 31.6 PG (ref 27–33)
MCHC RBC AUTO-ENTMCNC: 31 G/DL (ref 33.6–35)
MCV RBC AUTO: 101.7 FL (ref 81.4–97.8)
MONOCYTES # BLD AUTO: 0.95 K/UL (ref 0–0.85)
MONOCYTES NFR BLD AUTO: 12.5 % (ref 0–13.4)
NEUTROPHILS # BLD AUTO: 4.93 K/UL (ref 2–7.15)
NEUTROPHILS NFR BLD: 64.9 % (ref 44–72)
NRBC # BLD AUTO: 0 K/UL
NRBC BLD-RTO: 0 /100 WBC
PHOSPHATE SERPL-MCNC: 4.1 MG/DL (ref 2.5–4.5)
PLATELET # BLD AUTO: 261 K/UL (ref 164–446)
PMV BLD AUTO: 10.3 FL (ref 9–12.9)
POTASSIUM SERPL-SCNC: 3.8 MMOL/L (ref 3.6–5.5)
POTASSIUM SERPL-SCNC: 4.2 MMOL/L (ref 3.6–5.5)
PROT SERPL-MCNC: 6.6 G/DL (ref 6–8.2)
RBC # BLD AUTO: 3.58 M/UL (ref 4.2–5.4)
SODIUM SERPL-SCNC: 140 MMOL/L (ref 135–145)
SODIUM SERPL-SCNC: 141 MMOL/L (ref 135–145)
WBC # BLD AUTO: 7.6 K/UL (ref 4.8–10.8)

## 2019-05-07 PROCEDURE — 700111 HCHG RX REV CODE 636 W/ 250 OVERRIDE (IP): Performed by: HOSPITALIST

## 2019-05-07 PROCEDURE — 80053 COMPREHEN METABOLIC PANEL: CPT

## 2019-05-07 PROCEDURE — 36415 COLL VENOUS BLD VENIPUNCTURE: CPT

## 2019-05-07 PROCEDURE — 700102 HCHG RX REV CODE 250 W/ 637 OVERRIDE(OP): Performed by: HOSPITALIST

## 2019-05-07 PROCEDURE — A9270 NON-COVERED ITEM OR SERVICE: HCPCS | Performed by: PSYCHIATRY & NEUROLOGY

## 2019-05-07 PROCEDURE — 85025 COMPLETE CBC W/AUTO DIFF WBC: CPT

## 2019-05-07 PROCEDURE — 74018 RADEX ABDOMEN 1 VIEW: CPT

## 2019-05-07 PROCEDURE — 99232 SBSQ HOSP IP/OBS MODERATE 35: CPT | Performed by: INTERNAL MEDICINE

## 2019-05-07 PROCEDURE — 80048 BASIC METABOLIC PNL TOTAL CA: CPT

## 2019-05-07 PROCEDURE — 700102 HCHG RX REV CODE 250 W/ 637 OVERRIDE(OP): Performed by: INTERNAL MEDICINE

## 2019-05-07 PROCEDURE — 700102 HCHG RX REV CODE 250 W/ 637 OVERRIDE(OP): Performed by: PSYCHIATRY & NEUROLOGY

## 2019-05-07 PROCEDURE — 90471 IMMUNIZATION ADMIN: CPT

## 2019-05-07 PROCEDURE — 84100 ASSAY OF PHOSPHORUS: CPT

## 2019-05-07 PROCEDURE — 99232 SBSQ HOSP IP/OBS MODERATE 35: CPT | Mod: GC | Performed by: PSYCHIATRY & NEUROLOGY

## 2019-05-07 PROCEDURE — 90686 IIV4 VACC NO PRSV 0.5 ML IM: CPT | Performed by: HOSPITALIST

## 2019-05-07 PROCEDURE — 83735 ASSAY OF MAGNESIUM: CPT

## 2019-05-07 PROCEDURE — A9270 NON-COVERED ITEM OR SERVICE: HCPCS | Performed by: HOSPITALIST

## 2019-05-07 PROCEDURE — A9270 NON-COVERED ITEM OR SERVICE: HCPCS | Performed by: INTERNAL MEDICINE

## 2019-05-07 PROCEDURE — 770001 HCHG ROOM/CARE - MED/SURG/GYN PRIV*

## 2019-05-07 PROCEDURE — 83690 ASSAY OF LIPASE: CPT

## 2019-05-07 RX ORDER — IBUPROFEN 400 MG/1
400 TABLET ORAL ONCE
Status: DISPENSED | OUTPATIENT
Start: 2019-05-07 | End: 2019-05-08

## 2019-05-07 RX ORDER — OXYCODONE HYDROCHLORIDE 5 MG/1
2.5 TABLET ORAL EVERY 8 HOURS PRN
Status: DISCONTINUED | OUTPATIENT
Start: 2019-05-07 | End: 2019-05-11 | Stop reason: HOSPADM

## 2019-05-07 RX ADMIN — HEPARIN SODIUM 5000 UNITS: 5000 INJECTION, SOLUTION INTRAVENOUS; SUBCUTANEOUS at 21:09

## 2019-05-07 RX ADMIN — LISINOPRIL 10 MG: 10 TABLET ORAL at 05:35

## 2019-05-07 RX ADMIN — LORAZEPAM 1 MG: 1 TABLET ORAL at 05:37

## 2019-05-07 RX ADMIN — OXYCODONE HYDROCHLORIDE 2.5 MG: 5 TABLET ORAL at 21:09

## 2019-05-07 RX ADMIN — NYSTATIN: 100000 CREAM TOPICAL at 17:47

## 2019-05-07 RX ADMIN — LORAZEPAM 1 MG: 1 TABLET ORAL at 12:19

## 2019-05-07 RX ADMIN — IBUPROFEN 400 MG: 800 TABLET, FILM COATED ORAL at 14:42

## 2019-05-07 RX ADMIN — HEPARIN SODIUM 5000 UNITS: 5000 INJECTION, SOLUTION INTRAVENOUS; SUBCUTANEOUS at 05:36

## 2019-05-07 RX ADMIN — PANCRELIPASE 3000 UNITS: 15000; 3000; 9500 CAPSULE, DELAYED RELEASE ORAL at 17:47

## 2019-05-07 RX ADMIN — DOXEPIN HYDROCHLORIDE 10 MG: 10 CAPSULE ORAL at 21:11

## 2019-05-07 RX ADMIN — ONDANSETRON 4 MG: 2 INJECTION INTRAMUSCULAR; INTRAVENOUS at 00:06

## 2019-05-07 RX ADMIN — PANCRELIPASE 3000 UNITS: 15000; 3000; 9500 CAPSULE, DELAYED RELEASE ORAL at 12:23

## 2019-05-07 RX ADMIN — NYSTATIN: 100000 CREAM TOPICAL at 08:55

## 2019-05-07 RX ADMIN — MAGNESIUM OXIDE TAB 400 MG (241.3 MG ELEMENTAL MG) 400 MG: 400 (241.3 MG) TAB at 17:46

## 2019-05-07 RX ADMIN — MAGNESIUM OXIDE TAB 400 MG (241.3 MG ELEMENTAL MG) 400 MG: 400 (241.3 MG) TAB at 05:35

## 2019-05-07 RX ADMIN — LORAZEPAM 1 MG: 1 TABLET ORAL at 18:48

## 2019-05-07 RX ADMIN — OMEPRAZOLE 20 MG: 20 CAPSULE, DELAYED RELEASE ORAL at 05:34

## 2019-05-07 RX ADMIN — SENNOSIDES AND DOCUSATE SODIUM 2 TABLET: 8.6; 5 TABLET ORAL at 17:46

## 2019-05-07 RX ADMIN — Medication 1 CAPSULE: at 08:55

## 2019-05-07 RX ADMIN — IBUPROFEN 400 MG: 800 TABLET, FILM COATED ORAL at 21:15

## 2019-05-07 RX ADMIN — LISINOPRIL 10 MG: 10 TABLET ORAL at 17:46

## 2019-05-07 RX ADMIN — OXYCODONE HYDROCHLORIDE 2.5 MG: 5 TABLET ORAL at 12:19

## 2019-05-07 RX ADMIN — PANCRELIPASE 3000 UNITS: 15000; 3000; 9500 CAPSULE, DELAYED RELEASE ORAL at 08:55

## 2019-05-07 RX ADMIN — HEPARIN SODIUM 5000 UNITS: 5000 INJECTION, SOLUTION INTRAVENOUS; SUBCUTANEOUS at 14:42

## 2019-05-07 RX ADMIN — ACETAMINOPHEN 650 MG: 325 TABLET, FILM COATED ORAL at 14:42

## 2019-05-07 RX ADMIN — INFLUENZA A VIRUS A/MICHIGAN/45/2015 X-275 (H1N1) ANTIGEN (FORMALDEHYDE INACTIVATED), INFLUENZA A VIRUS A/SINGAPORE/INFIMH-16-0019/2016 IVR-186 (H3N2) ANTIGEN (FORMALDEHYDE INACTIVATED), INFLUENZA B VIRUS B/PHUKET/3073/2013 ANTIGEN (FORMALDEHYDE INACTIVATED), AND INFLUENZA B VIRUS B/MARYLAND/15/2016 BX-69A ANTIGEN (FORMALDEHYDE INACTIVATED) 0.5 ML: 15; 15; 15; 15 INJECTION, SUSPENSION INTRAMUSCULAR at 18:41

## 2019-05-07 RX ADMIN — IBUPROFEN 400 MG: 800 TABLET, FILM COATED ORAL at 05:45

## 2019-05-07 RX ADMIN — QUETIAPINE FUMARATE 50 MG: 25 TABLET ORAL at 21:11

## 2019-05-07 ASSESSMENT — ENCOUNTER SYMPTOMS
BRUISES/BLEEDS EASILY: 0
WEAKNESS: 1
NAUSEA: 0
ORTHOPNEA: 0
BACK PAIN: 0
MYALGIAS: 0
FEVER: 0
DIZZINESS: 0
BLURRED VISION: 0
COUGH: 0
TREMORS: 0
SINUS PAIN: 0

## 2019-05-07 ASSESSMENT — LIFESTYLE VARIABLES
TREMOR: NO TREMOR
ANXIETY: MILDLY ANXIOUS
TREMOR: NO TREMOR
VISUAL DISTURBANCES: NOT PRESENT
TOTAL SCORE: 1
HEADACHE, FULLNESS IN HEAD: NOT PRESENT
TOTAL SCORE: 0
AUDITORY DISTURBANCES: NOT PRESENT
HEADACHE, FULLNESS IN HEAD: NOT PRESENT
ANXIETY: NO ANXIETY (AT EASE)
ORIENTATION AND CLOUDING OF SENSORIUM: ORIENTED AND CAN DO SERIAL ADDITIONS
AGITATION: NORMAL ACTIVITY
ORIENTATION AND CLOUDING OF SENSORIUM: ORIENTED AND CAN DO SERIAL ADDITIONS
NAUSEA AND VOMITING: NO NAUSEA AND NO VOMITING
PAROXYSMAL SWEATS: NO SWEAT VISIBLE
NAUSEA AND VOMITING: NO NAUSEA AND NO VOMITING
AUDITORY DISTURBANCES: NOT PRESENT
VISUAL DISTURBANCES: NOT PRESENT
PAROXYSMAL SWEATS: NO SWEAT VISIBLE
AGITATION: NORMAL ACTIVITY

## 2019-05-07 NOTE — PROGRESS NOTES
Patient refused Heparin administration.  Paged hospitalist and updated Dr. Rodrigez.  No orders related to heparin at this time.    Also updated Dr. Rodrigez on patient's pain.  Dr. Rodrigez ordered 2 mg of morphine IV one time for pain.

## 2019-05-07 NOTE — DISCHARGE PLANNING
Legal Hold     Referral: Legal Hold Court     Intervention: Pt presented for legal hold meeting with  to discuss legal options of contesting hold and meeting with the court doctors tomorrow afternoon, or not contesting legal hold and continuing the hold for one week.  advised that pt's legal hold will be be continued for one week (5/16/19).       Plan: Pt's legal hold has been continued for one week. Pt awaiting transfer to an in patient psych facility.      '

## 2019-05-07 NOTE — ASSESSMENT & PLAN NOTE
Nonspecific. She indicates she has abdominal pain and muscle pain  She refuses tramadol.  Ordered KUB as well as CMP and lipase  If no organic cause, will need to taper pain medications  Explained that her lipase is normal and that she has no acute pancreatitis.

## 2019-05-07 NOTE — PROGRESS NOTES
[facility] Medicine Daily Progress Note    Date of Service  5/7/2019    Chief Complaint  58 y.o. female admitted 4/29/2019 with suicide attempt, alcohol intoxication/alcohol withdrawal      Interval Problem Update  5/7. Asking for pain medications for a chronic abdominal pain. Pain is out of proportion to exam.      Consultants/Specialty  Psychiatry    Code Status  Full code    Disposition  To psychiatric facility.   Cleared for dc from IM.     Review of Systems  Review of Systems   Constitutional: Negative for fever.   HENT: Negative for nosebleeds and sinus pain.    Eyes: Negative for blurred vision.   Respiratory: Negative for cough.    Cardiovascular: Negative for chest pain and orthopnea.   Gastrointestinal: Negative for nausea.   Genitourinary: Negative for dysuria and frequency.   Musculoskeletal: Negative for back pain and myalgias.   Skin: Negative for itching.   Neurological: Positive for weakness. Negative for dizziness and tremors.   Endo/Heme/Allergies: Does not bruise/bleed easily.        Physical Exam  Temp:  [36 °C (96.8 °F)-36.9 °C (98.5 °F)] 36.9 °C (98.5 °F)  Pulse:  [70-88] 88  Resp:  [15-18] 17  BP: ()/(63-89) 117/89  SpO2:  [92 %-98 %] 92 %    Physical Exam   Constitutional: She is oriented to person, place, and time. She appears well-nourished. No distress.   HENT:   Head: Normocephalic and atraumatic.   Mouth/Throat: No oropharyngeal exudate.   Eyes: Conjunctivae are normal.   Neck: Normal range of motion. Neck supple.   Cardiovascular: Regular rhythm and normal heart sounds.  Exam reveals no friction rub.    Pulmonary/Chest: Effort normal and breath sounds normal. No respiratory distress. She has no wheezes.   Abdominal: Soft. Bowel sounds are normal. She exhibits no distension. There is tenderness (Pain out of proportion to exam.).   Musculoskeletal: Normal range of motion. She exhibits no tenderness.   Neurological: She is oriented to person, place, and time. She exhibits normal muscle  tone.   Skin: Skin is warm and dry.   Psychiatric: She has a normal mood and affect.   Odd behavior   Nursing note and vitals reviewed.      Fluids    Intake/Output Summary (Last 24 hours) at 05/07/19 1649  Last data filed at 05/07/19 1300   Gross per 24 hour   Intake              360 ml   Output                0 ml   Net              360 ml       Laboratory  Recent Labs      05/07/19   0349   WBC  7.6   RBC  3.58*   HEMOGLOBIN  11.3*   HEMATOCRIT  36.4*   MCV  101.7*   MCH  31.6   MCHC  31.0*   RDW  57.4*   PLATELETCT  261   MPV  10.3     Recent Labs      05/05/19   0157  05/07/19   0349  05/07/19   1206   SODIUM  141  141  140   POTASSIUM  3.8  3.8  4.2   CHLORIDE  109  108  107   CO2  24  25  26   GLUCOSE  97  98  114*   BUN  17  17  17   CREATININE  0.51  0.60  0.77   CALCIUM  9.3  9.2  9.5                   Imaging  [id number] VIEW   Final Result      Nonobstructive bowel gas pattern. Moderate amount of stool throughout the colon.           Assessment/Plan  * Drug overdose, intentional (HCC)   Assessment & Plan    This patient presents with SI and intential drug overdose of prozac and lisinopril   IP psych consultation in the am   Legal hold  [facility]  Poison control case # [id number].  Ms. Marquis was here with a confirmed overdose of Unknown substance or drug; she has no other known overdoses.    Resolved.      Hypokalemia- (present on admission)   Assessment & Plan    Resolved.   Hypomagnesemia improved, started p.o. Supplement  Will f/u in am.      Bipolar 2 disorder (HCC)- (present on admission)   Assessment & Plan    History of, IP psych consulted     PTSD (post-traumatic stress disorder)- (present on admission)   Assessment & Plan    History of ip psych consultation     UTI (urinary tract infection)   Assessment & Plan    Completed ceftriaxone.      Suicidal ideation   Assessment & Plan    Continue sitter  Legal hold  Psych consult     Anemia   Assessment & Plan    Most likely chronic, continue monitoring,  no need for blood transfusion at this time.  Stable.     Hypoglycemia   Assessment & Plan    Resolved. Tolerating diet better.      Alcohol intoxication (HCC)   Assessment & Plan       Resolved.      Chronic pain- (present on admission)   Assessment & Plan    Nonspecific. She indicates she has abdominal pain and muscle pain  She refuses tramadol.  Ordered KUB as well as CMP and lipase  If no organic cause, will need to taper pain medications      VTE prophylaxis: Heparin SQ    Reviewed vitals, labs, imaging, staff notes.  Discussed assessment and plan with Ashleigh Marquis  Discussed with RN, SW.

## 2019-05-07 NOTE — DISCHARGE PLANNING
Chart Review    Care Transition Team Assessment    Information Source  Orientation : Oriented x 4  Who is responsible for making decisions for patient? : Patient         Elopement Risk  Legal Hold: Elopement Risk  Ambulatory or Self Mobile in Wheelchair: Yes  Disoriented: No  Psychiatric Symptoms: None  History of Wandering: No  Elopement this Admit: No  Vocalizing Wanting to Leave: No  Displays Behaviors, Body Language Wanting to Leave: No-Not at Risk for Elopement  Time of Legal Hold: 1618  Date of Legal Hold: 04/29/19  Elopement Risk: Not at Risk for Elopement  Wanderguard On: Unavailable  Personal Belongings: Hospital Clothing Only  Environmental Precautions: Dietary Notified for Plastic Utensils / Paperware Only    Interdisciplinary Discharge Planning  Lives with - Patient's Self Care Capacity: Alone and Able to Care For Self  Patient or legal guardian wants to designate a caregiver (see row info): Yes  Caregiver name: Aixa Marquis  Caregiver relationship to patient: daughter  Caregiver contact info: [phone]  ([facility]) Authorization for Release of Health Information has been completed: Yes  Housing / Facility: Homeless  Prior Services: None    Discharge Preparedness  What is your plan after discharge?: Other (comment) (Mental Health Facility)  Prior Functional Level: Ambulatory  Difficulity with ADLs: None  Difficulity with IADLs: None    Functional Assesment  Prior Functional Level: Ambulatory    Finances  Prescription Coverage: Yes                   Domestic Abuse  Have you ever been the victim of abuse or violence?: No    Psychological Assessment  History of Substance Abuse: Alcohol  Date Last Used - Alcohol: 4/29/19  History of Psychiatric Problems: Yes    Discharge Risks or Barriers  Discharge risks or barriers?: No PCP, Substance abuse, Mental health, Post-acute placement / services, Homeless / couch surfing, Lives alone, no community support  Patient risk factors: Homeless, Lack of outside supports,  Lives alone and no community support, Mental health, Multiple ED visits, No PCP    Anticipated Discharge Information  Anticipated discharge disposition:  (Mental Health Facility)

## 2019-05-07 NOTE — CARE PLAN
Problem: Safety  Goal: Will remain free from injury  Outcome: PROGRESSING AS EXPECTED  1:1 safety sitter with patient at all times.     Problem: Pain Management  Goal: Pain level will decrease to patient's comfort goal  Outcome: PROGRESSING SLOWER THAN EXPECTED  Patient complaining of pain in abdomen throughout shift. Patient offered Tylenol, ibuprofen, and tramadol for pain management. Patient continually refuses all three medications, stating that they do not work, and requests IV morphine. Patient was given one time dose of IV morphine 5/6 before transferring to this floor. Paged MD Mattson (on call hospitalist) about patient's pain, initially received order for IV toradol, but patient is allergic, so received one time order for ibuprofen to be given early. Patient refused this. Patient given hot pack in an attempt at non-pharmacologic pain relief, but patient refused to try, stating that she didn't think it would work. Encouraged rest and relaxation for pain management if patient continues to refuse the available pain medications.

## 2019-05-07 NOTE — PROGRESS NOTES
· 2 RN skin check complete with [place].  · Devices in place none.  · The following interventions in place: Patient turns self side to side, moves around frequently.     No open wounds or pressure related injuries noted. Redness to groin and perineal area.

## 2019-05-07 NOTE — PSYCHIATRY
"PSYCHIATRIC FOLLOW UP:    Reason for Admission: Drug overdose, SA    Psychiatric Supervising Attending: Dr. Restrepo     Subjective:    Patient seen in psychiatric follow up. Patient's main concern is her pain and vaginal discharge/discomfort which is deferred to the primary team. The patient is on a legal hold and continued for 1 week. The patient is amenable to continued psychiatric management of mood and WILMER. Patient states that she is involved with AA and \"knows what to do.\" the patient would likely benefit from formalized outpatient WILMER treatment once discharged, but patient is currently not interested. Patient declined EtOH craving management with psychotropics. Patient reported depression and PTSD; however, the patient appears to be minimizing symptoms as she denies all except difficulty sleeping. The patient stated understanding but disagreement with the recommendation not to take benzodiazepines in the setting of alcohol use disorder. Denies SI/HI. Denies av hallucinations.       Medications Administered in Last 48 Hours from 05/05/2019 1556 to 05/07/2019 1556     Date/Time Order Dose Route Action Comments    05/07/2019 0600 senna-docusate (PERICOLACE or SENOKOT S) 8.6-50 MG per tablet 2 Tab 2 Tab Oral Refused     05/06/2019 1722 senna-docusate (PERICOLACE or SENOKOT S) 8.6-50 MG per tablet 2 Tab 2 Tab Oral Given     05/06/2019 0600 senna-docusate (PERICOLACE or SENOKOT S) 8.6-50 MG per tablet 2 Tab 2 Tab Oral Refused     05/05/2019 1655 senna-docusate (PERICOLACE or SENOKOT S) 8.6-50 MG per tablet 2 Tab 2 Tab Oral Given     05/07/2019 1442 heparin injection 5,000 Units 5,000 Units Subcutaneous Given     05/07/2019 0536 heparin injection 5,000 Units 5,000 Units Subcutaneous Given     05/06/2019 2200 heparin injection 5,000 Units 5,000 Units Subcutaneous Refused     05/06/2019 1309 heparin injection 5,000 Units 5,000 Units Subcutaneous Given     05/06/2019 0559 heparin injection 5,000 Units 5,000 Units " Subcutaneous Given     05/05/2019 2221 heparin injection 5,000 Units 5,000 Units Subcutaneous Given     05/07/2019 0006 ondansetron (ZOFRAN) syringe/vial injection 4 mg 4 mg Intravenous Given     05/07/2019 1442 ibuprofen (MOTRIN) tablet 400 mg 400 mg Oral Given     05/07/2019 0545 ibuprofen (MOTRIN) tablet 400 mg 400 mg Oral Given     05/06/2019 2141 ibuprofen (MOTRIN) tablet 400 mg 400 mg Oral Given     05/06/2019 1310 ibuprofen (MOTRIN) tablet 400 mg 400 mg Oral Given     05/06/2019 0609 ibuprofen (MOTRIN) tablet 400 mg 400 mg Oral Given     05/05/2019 1834 ibuprofen (MOTRIN) tablet 400 mg 400 mg Oral Given     05/07/2019 0534 omeprazole (PRILOSEC) capsule 20 mg 20 mg Oral Given     05/06/2019 0559 omeprazole (PRILOSEC) capsule 20 mg 20 mg Oral Given     05/07/2019 0855 lactobacillus rhamnosus (CULTURELLE) capsule 1 Cap 1 Cap Oral Given     05/06/2019 0559 lactobacillus rhamnosus (CULTURELLE) capsule 1 Cap 1 Cap Oral Given     05/07/2019 0535 lisinopril (PRINIVIL) tablet 10 mg 10 mg Oral Given     05/06/2019 1722 lisinopril (PRINIVIL) tablet 10 mg 10 mg Oral Given     05/06/2019 0559 lisinopril (PRINIVIL) tablet 10 mg 10 mg Oral Given     05/05/2019 1655 lisinopril (PRINIVIL) tablet 10 mg 10 mg Oral Given     05/07/2019 1223 pancrelipase (Lip-Prot-Amyl) (CREON 3000) 6181-7573 units capsule 3,000 Units 3,000 Units Oral Given     05/07/2019 0855 pancrelipase (Lip-Prot-Amyl) (CREON 3000) 6431-7467 units capsule 3,000 Units 3,000 Units Oral Given     05/06/2019 1722 pancrelipase (Lip-Prot-Amyl) (CREON 3000) 7197-5849 units capsule 3,000 Units 3,000 Units Oral Given     05/06/2019 1114 pancrelipase (Lip-Prot-Amyl) (CREON 3000) 9093-5537 units capsule 3,000 Units 3,000 Units Oral Given     05/06/2019 0558 pancrelipase (Lip-Prot-Amyl) (CREON 3000) 3320-0837 units capsule 3,000 Units 3,000 Units Oral Given     05/05/2019 1657 pancrelipase (Lip-Prot-Amyl) (CREON 3000) 6476-6737 units capsule 3,000 Units 3,000 Units Oral  Given     05/07/2019 1219 LORazepam (ATIVAN) tablet 1 mg 1 mg Oral Given     05/07/2019 0537 LORazepam (ATIVAN) tablet 1 mg 1 mg Oral Given     05/06/2019 2141 LORazepam (ATIVAN) tablet 1 mg 1 mg Oral Given     05/06/2019 1309 LORazepam (ATIVAN) tablet 1 mg 1 mg Oral Given     05/05/2019 1834 LORazepam (ATIVAN) tablet 1 mg 1 mg Oral Given     05/06/2019 2140 QUEtiapine (SEROQUEL) tablet 50 mg 50 mg Oral Given     05/05/2019 2221 QUEtiapine (SEROQUEL) tablet 50 mg 50 mg Oral Given     05/06/2019 0629 cefTRIAXone (ROCEPHIN) 1 g in  mL IVPB 0 g Intravenous Stopped     05/06/2019 0559 cefTRIAXone (ROCEPHIN) 1 g in  mL IVPB 1 g Intravenous New Bag     05/07/2019 0855 nystatin (MYCOSTATIN) cream   Topical Given     05/06/2019 1800 nystatin (MYCOSTATIN) cream   Topical Given     05/06/2019 0559 nystatin (MYCOSTATIN) cream   Topical Given     05/05/2019 1656 nystatin (MYCOSTATIN) cream   Topical Given     05/07/2019 1442 acetaminophen (TYLENOL) tablet 650 mg 650 mg Oral Given     05/06/2019 1309 acetaminophen (TYLENOL) tablet 650 mg 650 mg Oral Given     05/06/2019 0023 acetaminophen (TYLENOL) tablet 650 mg 650 mg Oral Given     05/05/2019 1655 acetaminophen (TYLENOL) tablet 650 mg 650 mg Oral Given     05/06/2019 0600 chlordiazePOXIDE (LIBRIUM) capsule 25 mg 25 mg Oral Given     05/07/2019 0535 magnesium oxide (MAG-OX) tablet 400 mg 400 mg Oral Given     05/06/2019 1722 magnesium oxide (MAG-OX) tablet 400 mg 400 mg Oral Given     05/06/2019 0559 magnesium oxide (MAG-OX) tablet 400 mg 400 mg Oral Given     05/05/2019 1655 magnesium oxide (MAG-OX) tablet 400 mg 400 mg Oral Given     05/06/2019 2140 doxepin (SINEQUAN) capsule 10 mg 10 mg Oral Given     05/05/2019 1657 doxepin (SINEQUAN) capsule 10 mg 10 mg Oral Given     05/05/2019 1945 doxepin (SINEQUAN) capsule 10 mg   Oral Canceled Entry     05/06/2019 1427 tramadol (ULTRAM) 50 MG tablet 50 mg 50 mg Oral Return to ADS     05/06/2019 1749 tramadol (ULTRAM) 50 MG  "tablet 50 mg 50 mg Oral Given     05/06/2019 2140 morphine (pf) 4 mg/ml injection 2 mg 2 mg Intravenous Given     05/07/2019 0115 ibuprofen (MOTRIN) tablet 400 mg 400 mg Oral Refused     05/07/2019 1219 oxyCODONE immediate-release (ROXICODONE) tablet 2.5 mg 2.5 mg Oral Given           Psychiatric Examination (MSE) :    Vitals: /72   Pulse 88   Temp 36 °C (96.8 °F) (Temporal)   Resp 16   Ht 1.626 m (5' 4\")   Wt 82.8 kg (182 lb 8.7 oz)   LMP 11/02/2015   SpO2 94%   Breastfeeding? No   BMI 31.33 kg/m²   General Appearance:  clean, patient has hair braided, patient appears uncomfortable, calm and cooperative, fair eye contact   Abnormal Movements: no tremor of hands noted today  Gait and Posture: Gait not assessed, appropriate posture ; able to move all 4 limbs  Speech: Not pressured. Normal rhythm and rate. Appropriate prosody. Not slurred, clearly articulated. Content: minimizing symptoms with vague answers to direct questions  Thought Process: Circumferential at times, goal directed.   Associations: Intact.   Abnormal or Psychotic Thoughts:Denies tactile and auditory hallucinations, No delusional thoughts. No paranoia elicited.   Judgement and Insight: Fair insight. Patient wants to get better. Poor judgement.   Orientation: ANOx3   Recent and Remote Memory not formally tested, grossly intact to interview  Attention Span and Concentration: Intact   Language: Appropriate level of vocabulary. Syntax/grammar intact.   Fund of Knowledge: Appropriate.   Mood and Affect: Depressed mood. Affect is mood congruent and restricted.   SI/HI: denies SI, denies HI.   Neurological Testing: (MOCA, MMSE, clock) deferred    Results for LAKSHMI DARLING (MRN [id number]) as of 5/7/2019 15:58   Ref. Range 5/7/2019 03:49   WBC Latest Ref Range: 4.8 - 10.8 K/uL 7.6   RBC Latest Ref Range: 4.20 - 5.40 M/uL 3.58 (L)   Hemoglobin Latest Ref Range: 12.0 - 16.0 g/dL 11.3 (L)   Hematocrit Latest Ref Range: 37.0 - 47.0 % 36.4 (L) "   MCV Latest Ref Range: 81.4 - 97.8 fL 101.7 (H)   MCH Latest Ref Range: 27.0 - 33.0 pg 31.6   MCHC Latest Ref Range: 33.6 - 35.0 g/dL 31.0 (L)   RDW Latest Ref Range: 35.9 - 50.0 fL 57.4 (H)   Platelet Count Latest Ref Range: 164 - 446 K/uL 261   MPV Latest Ref Range: 9.0 - 12.9 fL 10.3   Neutrophils-Polys Latest Ref Range: 44.00 - 72.00 % 64.90   Neutrophils (Absolute) Latest Ref Range: 2.00 - 7.15 K/uL 4.93   Lymphocytes Latest Ref Range: 22.00 - 41.00 % 18.90 (L)   Lymphs (Absolute) Latest Ref Range: 1.00 - 4.80 K/uL 1.44   Monocytes Latest Ref Range: 0.00 - 13.40 % 12.50   Monos (Absolute) Latest Ref Range: 0.00 - 0.85 K/uL 0.95 (H)   Eosinophils Latest Ref Range: 0.00 - 6.90 % 2.40   Eos (Absolute) Latest Ref Range: 0.00 - 0.51 K/uL 0.18   Basophils Latest Ref Range: 0.00 - 1.80 % 0.80   Baso (Absolute) Latest Ref Range: 0.00 - 0.12 K/uL 0.06   Immature Granulocytes Latest Ref Range: 0.00 - 0.90 % 0.50   Immature Granulocytes (abs) Latest Ref Range: 0.00 - 0.11 K/uL 0.04   Nucleated RBC Latest Units: /100 WBC 0.00   NRBC (Absolute) Latest Units: K/uL 0.00   Results for LAKSHMI DARLING (MRN [id number]) as of 5/7/2019 15:58   Ref. Range 5/7/2019 12:06   Sodium Latest Ref Range: 135 - 145 mmol/L 140   Potassium Latest Ref Range: 3.6 - 5.5 mmol/L 4.2   Chloride Latest Ref Range: 96 - 112 mmol/L 107   Co2 Latest Ref Range: 20 - 33 mmol/L 26   Anion Gap Latest Ref Range: 0.0 - 11.9  7.0   Glucose Latest Ref Range: 65 - 99 mg/dL 114 (H)   Bun Latest Ref Range: 8 - 22 mg/dL 17   Creatinine Latest Ref Range: 0.50 - 1.40 mg/dL 0.77   GFR If  Latest Ref Range: >60 mL/min/1.73 m 2 >60   GFR If Non  Latest Ref Range: >60 mL/min/1.73 m 2 >60   Calcium Latest Ref Range: 8.5 - 10.5 mg/dL 9.5   AST(SGOT) Latest Ref Range: 12 - 45 U/L 74 (H)   ALT(SGPT) Latest Ref Range: 2 - 50 U/L 72 (H)   Alkaline Phosphatase Latest Ref Range: 30 - 99 U/L 153 (H)   Total Bilirubin Latest Ref Range: 0.1 - 1.5  mg/dL 0.2   Albumin Latest Ref Range: 3.2 - 4.9 g/dL 3.4   Total Protein Latest Ref Range: 6.0 - 8.2 g/dL 6.6   Globulin Latest Ref Range: 1.9 - 3.5 g/dL 3.2   A-G Ratio Latest Units: g/dL 1.1   Results for LAKSHMI DARLING (MRN [id number]) as of 5/7/2019 15:58   Ref. Range 5/7/2019 12:06   Lipase Latest Ref Range: 11 - 82 U/L 55       Assessment/Plan:    1.  Suicide Attempt, overdose on antidepressant and hypertension medication   - Legal hold extended with 1 week continuance. Patient with underlying Bipolar dso II and PTSD. This is her second OD with suicide intention this month, at high risk of danger to herself at this time.   -Please transfer pt to inpatient psychiatric hospital when medically cleared.    2. Alcohol Use Disorder, alcohol withdrawal     completed             3. Bipolar 2 Disorder /susbtance induced mood dso  -Start Seroquel 50mg PO daily for mood.     4. Anxiety PRN   Patient is currently taking Ativan PRN,  this is not recommended in the setting of alcohol use disorder, recommend patient be tapered and discontinued; recommend hydroxyzine 50mg PO Q6 HRS PRN or consideration of Seroquel 25mg PO Q6 HRS PRN for anxiety, not to exceed 100mg PO within 24 hours;     5. Vaginal discomfort/discharge; defer to primary treatment team  Legal status: extended  Anticipate F/U: YES    Labs/tests ordered: NO   Phone calls: NO   Records reviewed: YES    Discussed patient with other provider: Dr Restrepo     Thank you for the consult.

## 2019-05-08 PROCEDURE — A9270 NON-COVERED ITEM OR SERVICE: HCPCS | Performed by: HOSPITALIST

## 2019-05-08 PROCEDURE — 700102 HCHG RX REV CODE 250 W/ 637 OVERRIDE(OP): Performed by: INTERNAL MEDICINE

## 2019-05-08 PROCEDURE — 700102 HCHG RX REV CODE 250 W/ 637 OVERRIDE(OP): Performed by: HOSPITALIST

## 2019-05-08 PROCEDURE — 99232 SBSQ HOSP IP/OBS MODERATE 35: CPT | Performed by: INTERNAL MEDICINE

## 2019-05-08 PROCEDURE — 770001 HCHG ROOM/CARE - MED/SURG/GYN PRIV*

## 2019-05-08 PROCEDURE — A9270 NON-COVERED ITEM OR SERVICE: HCPCS | Performed by: PSYCHIATRY & NEUROLOGY

## 2019-05-08 PROCEDURE — 700111 HCHG RX REV CODE 636 W/ 250 OVERRIDE (IP): Performed by: HOSPITALIST

## 2019-05-08 PROCEDURE — 700102 HCHG RX REV CODE 250 W/ 637 OVERRIDE(OP): Performed by: PSYCHIATRY & NEUROLOGY

## 2019-05-08 PROCEDURE — A9270 NON-COVERED ITEM OR SERVICE: HCPCS | Performed by: INTERNAL MEDICINE

## 2019-05-08 RX ADMIN — MAGNESIUM OXIDE TAB 400 MG (241.3 MG ELEMENTAL MG) 400 MG: 400 (241.3 MG) TAB at 05:14

## 2019-05-08 RX ADMIN — PANCRELIPASE 3000 UNITS: 15000; 3000; 9500 CAPSULE, DELAYED RELEASE ORAL at 17:30

## 2019-05-08 RX ADMIN — DOXEPIN HYDROCHLORIDE 10 MG: 10 CAPSULE ORAL at 21:15

## 2019-05-08 RX ADMIN — LORAZEPAM 1 MG: 1 TABLET ORAL at 12:45

## 2019-05-08 RX ADMIN — IBUPROFEN 400 MG: 800 TABLET, FILM COATED ORAL at 05:22

## 2019-05-08 RX ADMIN — HEPARIN SODIUM 5000 UNITS: 5000 INJECTION, SOLUTION INTRAVENOUS; SUBCUTANEOUS at 05:14

## 2019-05-08 RX ADMIN — SENNOSIDES AND DOCUSATE SODIUM 2 TABLET: 8.6; 5 TABLET ORAL at 05:14

## 2019-05-08 RX ADMIN — NYSTATIN 1 UNITS: 100000 CREAM TOPICAL at 05:17

## 2019-05-08 RX ADMIN — QUETIAPINE FUMARATE 50 MG: 25 TABLET ORAL at 21:14

## 2019-05-08 RX ADMIN — Medication 1 CAPSULE: at 10:44

## 2019-05-08 RX ADMIN — LISINOPRIL 10 MG: 10 TABLET ORAL at 18:53

## 2019-05-08 RX ADMIN — LORAZEPAM 1 MG: 1 TABLET ORAL at 18:52

## 2019-05-08 RX ADMIN — MAGNESIUM OXIDE TAB 400 MG (241.3 MG ELEMENTAL MG) 400 MG: 400 (241.3 MG) TAB at 18:52

## 2019-05-08 RX ADMIN — PANCRELIPASE 3000 UNITS: 15000; 3000; 9500 CAPSULE, DELAYED RELEASE ORAL at 10:44

## 2019-05-08 RX ADMIN — HEPARIN SODIUM 5000 UNITS: 5000 INJECTION, SOLUTION INTRAVENOUS; SUBCUTANEOUS at 21:15

## 2019-05-08 RX ADMIN — NYSTATIN: 100000 CREAM TOPICAL at 18:54

## 2019-05-08 RX ADMIN — OMEPRAZOLE 20 MG: 20 CAPSULE, DELAYED RELEASE ORAL at 05:14

## 2019-05-08 RX ADMIN — LISINOPRIL 10 MG: 10 TABLET ORAL at 05:14

## 2019-05-08 RX ADMIN — IBUPROFEN 400 MG: 800 TABLET, FILM COATED ORAL at 18:52

## 2019-05-08 RX ADMIN — OXYCODONE HYDROCHLORIDE 2.5 MG: 5 TABLET ORAL at 12:45

## 2019-05-08 RX ADMIN — ACETAMINOPHEN 650 MG: 325 TABLET, FILM COATED ORAL at 18:51

## 2019-05-08 ASSESSMENT — ENCOUNTER SYMPTOMS
FEVER: 0
BLURRED VISION: 0
BACK PAIN: 0
MYALGIAS: 0
DIZZINESS: 0
BRUISES/BLEEDS EASILY: 0
COUGH: 0
NAUSEA: 0
WEAKNESS: 1
SINUS PAIN: 0
ORTHOPNEA: 0
TREMORS: 0

## 2019-05-08 ASSESSMENT — LIFESTYLE VARIABLES
TREMOR: NO TREMOR
PAROXYSMAL SWEATS: NO SWEAT VISIBLE
NAUSEA AND VOMITING: NO NAUSEA AND NO VOMITING
ANXIETY: NO ANXIETY (AT EASE)
ORIENTATION AND CLOUDING OF SENSORIUM: ORIENTED AND CAN DO SERIAL ADDITIONS
AGITATION: NORMAL ACTIVITY
AUDITORY DISTURBANCES: NOT PRESENT
VISUAL DISTURBANCES: NOT PRESENT
HEADACHE, FULLNESS IN HEAD: NOT PRESENT
TOTAL SCORE: 0

## 2019-05-08 NOTE — PROGRESS NOTES
Pt A/OX4. On legal hold. Legal hold extended per psychiatry. Sitter with pt in room. CIWAs 0. CIWA protocol discontinued. Medicated with oxycodone for pain and ativan for anxiety per pt request. Pt ambulating around hallway with sitter. Stable gait. Mood seems stable today. Independent with ADLs. Will cont to monitor.

## 2019-05-08 NOTE — CARE PLAN
Problem: Venous Thromboembolism (VTW)/Deep Vein Thrombosis (DVT) Prevention:  Goal: Patient will participate in Venous Thrombosis (VTE)/Deep Vein Thrombosis (DVT)Prevention Measures  Outcome: PROGRESSING SLOWER THAN EXPECTED  Refusing SCD's educated

## 2019-05-08 NOTE — DISCHARGE PLANNING
Agency/Facility Name: [facility]  Spoke To: Tasia  Outcome: Referral under review.    Agency/Facility Name: [facility]  Spoke To: Radha  Outcome: Patient declined due to Medicaid insurance and inability to self pay.    Agency/Facility Name: [facility]   Spoke To: Marlen  Outcome: Referral not received, re-sent at 1355.    Agency/Facility Name: [facility]  Spoke To: Angle  Outcome: Patient declined due to Medicaid insurance.

## 2019-05-08 NOTE — PROGRESS NOTES
"Hospital Medicine Daily Progress Note    Date of Service  5/8/2019    Chief Complaint  58 y.o. female admitted 4/29/2019 with suicide attempt, alcohol intoxication/alcohol withdrawal      Interval Problem Update  5/7. Asking for pain medications for a chronic abdominal pain. Pain is out of proportion to exam.  5/8. Her \"pain\" has improved.     Consultants/Specialty  Psychiatry    Code Status  Full code    Disposition  To psychiatric facility.   Cleared for dc from IM.     Review of Systems  Review of Systems   Constitutional: Negative for fever.   HENT: Negative for nosebleeds and sinus pain.    Eyes: Negative for blurred vision.   Respiratory: Negative for cough.    Cardiovascular: Negative for chest pain and orthopnea.   Gastrointestinal: Negative for nausea.   Genitourinary: Negative for dysuria and frequency.   Musculoskeletal: Negative for back pain and myalgias.   Skin: Negative for itching.   Neurological: Positive for weakness. Negative for dizziness and tremors.   Endo/Heme/Allergies: Does not bruise/bleed easily.        Physical Exam  Temp:  [36.2 °C (97.1 °F)-36.4 °C (97.5 °F)] 36.4 °C (97.5 °F)  Pulse:  [78-91] 80  Resp:  [15-17] 15  BP: (112-128)/(64-81) 128/80  SpO2:  [93 %-96 %] 93 %    Physical Exam   Constitutional: She is oriented to person, place, and time. She appears well-nourished. No distress.   HENT:   Head: Normocephalic and atraumatic.   Mouth/Throat: No oropharyngeal exudate.   Eyes: Conjunctivae are normal.   Neck: Normal range of motion. Neck supple.   Cardiovascular: Regular rhythm and normal heart sounds.  Exam reveals no friction rub.    Pulmonary/Chest: Effort normal and breath sounds normal. No respiratory distress. She has no wheezes.   Abdominal: Soft. Bowel sounds are normal. She exhibits no distension. There is tenderness (Pain out of proportion to exam. \"improved\".).   Musculoskeletal: Normal range of motion. She exhibits no tenderness.   Neurological: She is oriented to " person, place, and time. She exhibits normal muscle tone.   Skin: Skin is warm and dry.   Psychiatric: She has a normal mood and affect.   Odd behavior   Nursing note and vitals reviewed.      Fluids    Intake/Output Summary (Last 24 hours) at 05/08/19 1654  Last data filed at 05/07/19 2109   Gross per 24 hour   Intake              100 ml   Output                0 ml   Net              100 ml       Laboratory  Recent Labs      05/07/19   0349   WBC  7.6   RBC  3.58*   HEMOGLOBIN  11.3*   HEMATOCRIT  36.4*   MCV  101.7*   MCH  31.6   MCHC  31.0*   RDW  57.4*   PLATELETCT  261   MPV  10.3     Recent Labs      05/07/19   0349  05/07/19   1206   SODIUM  141  140   POTASSIUM  3.8  4.2   CHLORIDE  108  107   CO2  25  26   GLUCOSE  98  114*   BUN  17  17   CREATININE  0.60  0.77   CALCIUM  9.2  9.5                   Imaging  [id number] VIEW   Final Result      Nonobstructive bowel gas pattern. Moderate amount of stool throughout the colon.           Assessment/Plan  * Drug overdose, intentional (HCC)   Assessment & Plan    This patient presents with SI and intential drug overdose of prozac and lisinopril   IP psych consultation in the am   Legal hold  [facility]  Poison control case # [id number].  Ms. Marquis was here with a confirmed overdose of Unknown substance or drug; she has no other known overdoses.    Resolved.   Legal hold extended for her behavioral issues     Hypokalemia- (present on admission)   Assessment & Plan    Resolved.   Hypomagnesemia improved, started p.o. Supplement  Will f/u in am.      Bipolar 2 disorder (HCC)- (present on admission)   Assessment & Plan    History of, IP psych consulted  Legal hold extended     PTSD (post-traumatic stress disorder)- (present on admission)   Assessment & Plan    History of ip psych consultation     UTI (urinary tract infection)   Assessment & Plan    Completed ceftriaxone.      Suicidal ideation   Assessment & Plan    Continue sitter  Legal hold  Psych consult     Anemia    Assessment & Plan    Most likely chronic, continue monitoring, no need for blood transfusion at this time.  Stable.     Hypoglycemia   Assessment & Plan    Resolved. Tolerating diet better.      Alcohol intoxication (HCC)   Assessment & Plan       Resolved.      Chronic pain- (present on admission)   Assessment & Plan    Nonspecific. She indicates she has abdominal pain and muscle pain  She refuses tramadol.  Ordered KUB as well as CMP and lipase  If no organic cause, will need to taper pain medications  Explained that her lipase is normal and that she has no acute pancreatitis.       VTE prophylaxis: Heparin SQ    Reviewed vitals, labs, imaging, staff notes.  Discussed assessment and plan with Ashleigh Marquis. Gave reassurance  Discussed with RN, KOLTON.

## 2019-05-09 PROBLEM — B37.31 YEAST VAGINITIS: Status: ACTIVE | Noted: 2019-05-09

## 2019-05-09 PROCEDURE — 700111 HCHG RX REV CODE 636 W/ 250 OVERRIDE (IP): Performed by: HOSPITALIST

## 2019-05-09 PROCEDURE — 700102 HCHG RX REV CODE 250 W/ 637 OVERRIDE(OP): Performed by: INTERNAL MEDICINE

## 2019-05-09 PROCEDURE — 700102 HCHG RX REV CODE 250 W/ 637 OVERRIDE(OP): Performed by: PSYCHIATRY & NEUROLOGY

## 2019-05-09 PROCEDURE — 700102 HCHG RX REV CODE 250 W/ 637 OVERRIDE(OP): Performed by: HOSPITALIST

## 2019-05-09 PROCEDURE — 99232 SBSQ HOSP IP/OBS MODERATE 35: CPT | Mod: GC | Performed by: PSYCHIATRY & NEUROLOGY

## 2019-05-09 PROCEDURE — A9270 NON-COVERED ITEM OR SERVICE: HCPCS | Performed by: HOSPITALIST

## 2019-05-09 PROCEDURE — 770001 HCHG ROOM/CARE - MED/SURG/GYN PRIV*

## 2019-05-09 PROCEDURE — 99233 SBSQ HOSP IP/OBS HIGH 50: CPT | Performed by: INTERNAL MEDICINE

## 2019-05-09 PROCEDURE — A9270 NON-COVERED ITEM OR SERVICE: HCPCS | Performed by: INTERNAL MEDICINE

## 2019-05-09 PROCEDURE — A9270 NON-COVERED ITEM OR SERVICE: HCPCS | Performed by: PSYCHIATRY & NEUROLOGY

## 2019-05-09 RX ORDER — LORAZEPAM 1 MG/1
1 TABLET ORAL EVERY 12 HOURS PRN
Status: DISCONTINUED | OUTPATIENT
Start: 2019-05-09 | End: 2019-05-11 | Stop reason: HOSPADM

## 2019-05-09 RX ORDER — HYDROXYZINE 50 MG/1
50 TABLET, FILM COATED ORAL EVERY 6 HOURS PRN
Status: DISCONTINUED | OUTPATIENT
Start: 2019-05-09 | End: 2019-05-11 | Stop reason: HOSPADM

## 2019-05-09 RX ADMIN — QUETIAPINE FUMARATE 50 MG: 25 TABLET ORAL at 20:57

## 2019-05-09 RX ADMIN — ACETAMINOPHEN 650 MG: 325 TABLET, FILM COATED ORAL at 20:57

## 2019-05-09 RX ADMIN — PANCRELIPASE 3000 UNITS: 15000; 3000; 9500 CAPSULE, DELAYED RELEASE ORAL at 18:25

## 2019-05-09 RX ADMIN — PANCRELIPASE 3000 UNITS: 15000; 3000; 9500 CAPSULE, DELAYED RELEASE ORAL at 08:53

## 2019-05-09 RX ADMIN — NYSTATIN: 100000 CREAM TOPICAL at 18:25

## 2019-05-09 RX ADMIN — FLUCONAZOLE 150 MG: 50 TABLET ORAL at 20:57

## 2019-05-09 RX ADMIN — HEPARIN SODIUM 5000 UNITS: 5000 INJECTION, SOLUTION INTRAVENOUS; SUBCUTANEOUS at 05:55

## 2019-05-09 RX ADMIN — LISINOPRIL 10 MG: 10 TABLET ORAL at 18:25

## 2019-05-09 RX ADMIN — LISINOPRIL 10 MG: 10 TABLET ORAL at 05:54

## 2019-05-09 RX ADMIN — MAGNESIUM OXIDE TAB 400 MG (241.3 MG ELEMENTAL MG) 400 MG: 400 (241.3 MG) TAB at 05:54

## 2019-05-09 RX ADMIN — LORAZEPAM 1 MG: 1 TABLET ORAL at 07:54

## 2019-05-09 RX ADMIN — HEPARIN SODIUM 5000 UNITS: 5000 INJECTION, SOLUTION INTRAVENOUS; SUBCUTANEOUS at 20:57

## 2019-05-09 RX ADMIN — PANCRELIPASE 3000 UNITS: 15000; 3000; 9500 CAPSULE, DELAYED RELEASE ORAL at 13:10

## 2019-05-09 RX ADMIN — ACETAMINOPHEN 650 MG: 325 TABLET, FILM COATED ORAL at 14:10

## 2019-05-09 RX ADMIN — DOXEPIN HYDROCHLORIDE 10 MG: 10 CAPSULE ORAL at 20:57

## 2019-05-09 RX ADMIN — OXYCODONE HYDROCHLORIDE 2.5 MG: 5 TABLET ORAL at 16:27

## 2019-05-09 RX ADMIN — LORAZEPAM 1 MG: 1 TABLET ORAL at 14:10

## 2019-05-09 RX ADMIN — OXYCODONE HYDROCHLORIDE 2.5 MG: 5 TABLET ORAL at 07:54

## 2019-05-09 RX ADMIN — Medication 1 CAPSULE: at 08:53

## 2019-05-09 RX ADMIN — IBUPROFEN 400 MG: 800 TABLET, FILM COATED ORAL at 05:54

## 2019-05-09 RX ADMIN — NYSTATIN: 100000 CREAM TOPICAL at 05:50

## 2019-05-09 RX ADMIN — OMEPRAZOLE 20 MG: 20 CAPSULE, DELAYED RELEASE ORAL at 05:54

## 2019-05-09 RX ADMIN — HEPARIN SODIUM 5000 UNITS: 5000 INJECTION, SOLUTION INTRAVENOUS; SUBCUTANEOUS at 13:10

## 2019-05-09 RX ADMIN — SENNOSIDES AND DOCUSATE SODIUM 2 TABLET: 8.6; 5 TABLET ORAL at 18:25

## 2019-05-09 RX ADMIN — IBUPROFEN 400 MG: 800 TABLET, FILM COATED ORAL at 13:16

## 2019-05-09 RX ADMIN — MAGNESIUM OXIDE TAB 400 MG (241.3 MG ELEMENTAL MG) 400 MG: 400 (241.3 MG) TAB at 18:25

## 2019-05-09 RX ADMIN — ACETAMINOPHEN 650 MG: 325 TABLET, FILM COATED ORAL at 08:59

## 2019-05-09 ASSESSMENT — ENCOUNTER SYMPTOMS
NAUSEA: 0
TREMORS: 0
MYALGIAS: 0
FEVER: 0
DIZZINESS: 0
WEAKNESS: 1
BLURRED VISION: 0
ORTHOPNEA: 0
BRUISES/BLEEDS EASILY: 0
COUGH: 0
BACK PAIN: 0
SINUS PAIN: 0

## 2019-05-09 NOTE — PROGRESS NOTES
Patient has 1:1 patient safety cosme Rodriguez at bedside have room safety checklist for behavioral health patient in use, patient is on a legal hold.

## 2019-05-09 NOTE — PSYCHIATRY
"PSYCHIATRIC FOLLOW UP:    Reason for Admission: Drug overdose    Psychiatric Supervising Attending: Dr. Restrepo    Subjective:      Patient reports improvement in mood to \"better\" Patient is future oriented. Patient plans to participate with AA, to attend a meeting per day outpatient, to start going to [employer], to organize her [email or web address] funds, to stay in the shelter until she can find a room to rent in [place], to find food at the shelter. The patient reports a plan for sobriety. The patient reports knowledge of her resources. The patient reports that she will avoid alcohol as she knows that her mood depresses with EtOH. Denies SI/HI.       Medications Administered in Last 48 Hours from 05/07/2019 1631 to 05/09/2019 1631     Date/Time Order Dose Route Action Comments    05/09/2019 0600 senna-docusate (PERICOLACE or SENOKOT S) 8.6-50 MG per tablet 2 Tab 2 Tab Oral Refused     05/08/2019 1800 senna-docusate (PERICOLACE or SENOKOT S) 8.6-50 MG per tablet 2 Tab 2 Tab Oral Refused     05/08/2019 0514 senna-docusate (PERICOLACE or SENOKOT S) 8.6-50 MG per tablet 2 Tab 2 Tab Oral Given     05/07/2019 1746 senna-docusate (PERICOLACE or SENOKOT S) 8.6-50 MG per tablet 2 Tab 2 Tab Oral Given     05/09/2019 1310 heparin injection 5,000 Units 5,000 Units Subcutaneous Given     05/09/2019 0555 heparin injection 5,000 Units 5,000 Units Subcutaneous Given     05/08/2019 2115 heparin injection 5,000 Units 5,000 Units Subcutaneous Given     05/08/2019 1459 heparin injection 5,000 Units 5,000 Units Subcutaneous Refused     05/08/2019 0514 heparin injection 5,000 Units 5,000 Units Subcutaneous Given     05/07/2019 2109 heparin injection 5,000 Units 5,000 Units Subcutaneous Given     05/09/2019 1316 DEXTROSE 10% BOLUS 125 mL   Intravenous Canceled Entry not dispensed.    05/07/2019 1841 Influenza Vaccine Quad pf injection 0.5 mL 0.5 mL Intramuscular Given     05/09/2019 1316 ibuprofen (MOTRIN) tablet 400 mg 400 mg Oral Given     05/09/2019 " 0554 ibuprofen (MOTRIN) tablet 400 mg 400 mg Oral Given     05/08/2019 1852 ibuprofen (MOTRIN) tablet 400 mg 400 mg Oral Given     05/08/2019 0522 ibuprofen (MOTRIN) tablet 400 mg 400 mg Oral Given     05/07/2019 2115 ibuprofen (MOTRIN) tablet 400 mg 400 mg Oral Given     05/09/2019 0554 omeprazole (PRILOSEC) capsule 20 mg 20 mg Oral Given     05/08/2019 0514 omeprazole (PRILOSEC) capsule 20 mg 20 mg Oral Given     05/09/2019 0853 lactobacillus rhamnosus (CULTURELLE) capsule 1 Cap 1 Cap Oral Given     05/08/2019 1044 lactobacillus rhamnosus (CULTURELLE) capsule 1 Cap 1 Cap Oral Given given late because pt wanted to sleep    05/09/2019 0554 lisinopril (PRINIVIL) tablet 10 mg 10 mg Oral Given     05/08/2019 1853 lisinopril (PRINIVIL) tablet 10 mg 10 mg Oral Given     05/08/2019 0514 lisinopril (PRINIVIL) tablet 10 mg 10 mg Oral Given     05/07/2019 1746 lisinopril (PRINIVIL) tablet 10 mg 10 mg Oral Given     05/09/2019 1310 pancrelipase (Lip-Prot-Amyl) (CREON 3000) 7143-8126 units capsule 3,000 Units 3,000 Units Oral Given     05/09/2019 0853 pancrelipase (Lip-Prot-Amyl) (CREON 3000) 3819-6184 units capsule 3,000 Units 3,000 Units Oral Given     05/08/2019 1730 pancrelipase (Lip-Prot-Amyl) (CREON 3000) 5882-5727 units capsule 3,000 Units 3,000 Units Oral Given     05/08/2019 1330 pancrelipase (Lip-Prot-Amyl) (CREON 3000) 7734-7605 units capsule 3,000 Units 3,000 Units Oral Refused     05/08/2019 1044 pancrelipase (Lip-Prot-Amyl) (CREON 3000) 2157-5096 units capsule 3,000 Units 3,000 Units Oral Given given late because pt wanted to sleep    05/07/2019 1747 pancrelipase (Lip-Prot-Amyl) (CREON 3000) 4649-7736 units capsule 3,000 Units 3,000 Units Oral Given     05/09/2019 1410 LORazepam (ATIVAN) tablet 1 mg 1 mg Oral Given     05/09/2019 0754 LORazepam (ATIVAN) tablet 1 mg 1 mg Oral Given     05/08/2019 1852 LORazepam (ATIVAN) tablet 1 mg 1 mg Oral Given     05/08/2019 1245 LORazepam (ATIVAN) tablet 1 mg 1 mg Oral Given   "   05/07/2019 1848 LORazepam (ATIVAN) tablet 1 mg 1 mg Oral Given     05/08/2019 2114 QUEtiapine (SEROQUEL) tablet 50 mg 50 mg Oral Given     05/07/2019 2111 QUEtiapine (SEROQUEL) tablet 50 mg 50 mg Oral Given     05/09/2019 0550 nystatin (MYCOSTATIN) cream   Topical Given     05/08/2019 1854 nystatin (MYCOSTATIN) cream   Topical Given     05/08/2019 0517 nystatin (MYCOSTATIN) cream 1 Units Topical Given     05/07/2019 1747 nystatin (MYCOSTATIN) cream   Topical Given     05/09/2019 1410 acetaminophen (TYLENOL) tablet 650 mg 650 mg Oral Given     05/09/2019 0859 acetaminophen (TYLENOL) tablet 650 mg 650 mg Oral Given     05/08/2019 1851 acetaminophen (TYLENOL) tablet 650 mg 650 mg Oral Given     05/09/2019 0554 magnesium oxide (MAG-OX) tablet 400 mg 400 mg Oral Given     05/08/2019 1852 magnesium oxide (MAG-OX) tablet 400 mg 400 mg Oral Given     05/08/2019 0514 magnesium oxide (MAG-OX) tablet 400 mg 400 mg Oral Given     05/07/2019 1746 magnesium oxide (MAG-OX) tablet 400 mg 400 mg Oral Given     05/08/2019 2115 doxepin (SINEQUAN) capsule 10 mg 10 mg Oral Given     05/07/2019 2111 doxepin (SINEQUAN) capsule 10 mg 10 mg Oral Given workflow    05/09/2019 1627 oxyCODONE immediate-release (ROXICODONE) tablet 2.5 mg 2.5 mg Oral Given     05/09/2019 0754 oxyCODONE immediate-release (ROXICODONE) tablet 2.5 mg 2.5 mg Oral Given     05/08/2019 1245 oxyCODONE immediate-release (ROXICODONE) tablet 2.5 mg 2.5 mg Oral Given     05/07/2019 2109 oxyCODONE immediate-release (ROXICODONE) tablet 2.5 mg 2.5 mg Oral Given           Psychiatric Examination (MSE) :    Vitals: /100 Comment: RN notified  Pulse 95   Temp 36.6 °C (97.8 °F) (Temporal)   Resp 18   Ht 1.626 m (5' 4\")   Wt 81.6 kg (179 lb 14.3 oz)   LMP 11/02/2015   SpO2 99%   Breastfeeding? No   BMI 30.88 kg/m²   General Appearance:  clean, patient has hair down, patient appears comfortable, calm and cooperative, fair eye contact   Abnormal Movements: no tremor " "of hands noted today  Gait and Posture: Gait not assessed, appropriate posture ; able to move all 4 limbs  Speech: Not pressured. Normal rhythm and rate. Appropriate prosody. Not slurred, clearly articulated. Content: minimizing symptoms with vague answers to direct questions  Thought Process: Circumferential at times, goal directed to continue with treatment and continue life  Associations: Intact.   Abnormal or Psychotic Thoughts:Denies tactile and auditory hallucinations, No delusional thoughts. No paranoia elicited.   Judgement and Insight: Fair insight. Patient wants to get better. Fair judgement; pt able to make rational plan for outpatient care, WILMER tx with AA, and meeting ADLs.   Orientation: ANOx4   Recent and Remote Memory not formally tested, grossly intact to interview  Attention Span and Concentration: Intact   Language: Appropriate level of vocabulary. Syntax/grammar intact.   Fund of Knowledge: Appropriate.   Mood and Affect: \"Better\" . Affect is mood congruent and euthymic  SI/HI: denies SI, denies HI.   Neurological Testing: (MOCA, MMSE, clock) deferred    @resultrct[72H]@  Recent Results (from the past 72 hour(s))   CBC WITH DIFFERENTIAL    Collection Time: 05/07/19  3:49 AM   Result Value Ref Range    WBC 7.6 4.8 - 10.8 K/uL    RBC 3.58 (L) 4.20 - 5.40 M/uL    Hemoglobin 11.3 (L) 12.0 - 16.0 g/dL    Hematocrit 36.4 (L) 37.0 - 47.0 %    .7 (H) 81.4 - 97.8 fL    MCH 31.6 27.0 - 33.0 pg    MCHC 31.0 (L) 33.6 - 35.0 g/dL    RDW 57.4 (H) 35.9 - 50.0 fL    Platelet Count 261 164 - 446 K/uL    MPV 10.3 9.0 - 12.9 fL    Neutrophils-Polys 64.90 44.00 - 72.00 %    Lymphocytes 18.90 (L) 22.00 - 41.00 %    Monocytes 12.50 0.00 - 13.40 %    Eosinophils 2.40 0.00 - 6.90 %    Basophils 0.80 0.00 - 1.80 %    Immature Granulocytes 0.50 0.00 - 0.90 %    Nucleated RBC 0.00 /100 WBC    Neutrophils (Absolute) 4.93 2.00 - 7.15 K/uL    Lymphs (Absolute) 1.44 1.00 - 4.80 K/uL    Monos (Absolute) 0.95 (H) 0.00 - " 0.85 K/uL    Eos (Absolute) 0.18 0.00 - 0.51 K/uL    Baso (Absolute) 0.06 0.00 - 0.12 K/uL    Immature Granulocytes (abs) 0.04 0.00 - 0.11 K/uL    NRBC (Absolute) 0.00 K/uL   Basic Metabolic Panel    Collection Time: 05/07/19  3:49 AM   Result Value Ref Range    Sodium 141 135 - 145 mmol/L    Potassium 3.8 3.6 - 5.5 mmol/L    Chloride 108 96 - 112 mmol/L    Co2 25 20 - 33 mmol/L    Glucose 98 65 - 99 mg/dL    Bun 17 8 - 22 mg/dL    Creatinine 0.60 0.50 - 1.40 mg/dL    Calcium 9.2 8.5 - 10.5 mg/dL    Anion Gap 8.0 0.0 - 11.9   MAGNESIUM    Collection Time: 05/07/19  3:49 AM   Result Value Ref Range    Magnesium 1.6 1.5 - 2.5 mg/dL   PHOSPHORUS    Collection Time: 05/07/19  3:49 AM   Result Value Ref Range    Phosphorus 4.1 2.5 - 4.5 mg/dL   ESTIMATED GFR    Collection Time: 05/07/19  3:49 AM   Result Value Ref Range    GFR If African American >60 >60 mL/min/1.73 m 2    GFR If Non African American >60 >60 mL/min/1.73 m 2   Comp Metabolic Panel    Collection Time: 05/07/19 12:06 PM   Result Value Ref Range    Sodium 140 135 - 145 mmol/L    Potassium 4.2 3.6 - 5.5 mmol/L    Chloride 107 96 - 112 mmol/L    Co2 26 20 - 33 mmol/L    Anion Gap 7.0 0.0 - 11.9    Glucose 114 (H) 65 - 99 mg/dL    Bun 17 8 - 22 mg/dL    Creatinine 0.77 0.50 - 1.40 mg/dL    Calcium 9.5 8.5 - 10.5 mg/dL    AST(SGOT) 74 (H) 12 - 45 U/L    ALT(SGPT) 72 (H) 2 - 50 U/L    Alkaline Phosphatase 153 (H) 30 - 99 U/L    Total Bilirubin 0.2 0.1 - 1.5 mg/dL    Albumin 3.4 3.2 - 4.9 g/dL    Total Protein 6.6 6.0 - 8.2 g/dL    Globulin 3.2 1.9 - 3.5 g/dL    A-G Ratio 1.1 g/dL   LIPASE    Collection Time: 05/07/19 12:06 PM   Result Value Ref Range    Lipase 55 11 - 82 U/L   ESTIMATED GFR    Collection Time: 05/07/19 12:06 PM   Result Value Ref Range    GFR If African American >60 >60 mL/min/1.73 m 2    GFR If Non African American >60 >60 mL/min/1.73 m 2         Assessment:  1.  Suicide Attempt, overdose on antidepressant and hypertension medication   - Legal  hold extended with 1 week continuance. Patient with underlying Bipolar dso II and PTSD. This is her second OD with suicide intention this month, at high risk of danger to herself at this time.   -Please transfer pt to inpatient psychiatric hospital when medically cleared.  -Patient may have plastic fork/spoon to eat    2. Alcohol Use Disorder, severe   alcohol withdrawal,  completed             3. Bipolar 2 Disorder /susbtance induced mood dso  -ContinueSeroquel 50mg PO daily for mood.      4. Anxiety PRN   Adjusting Ativan schedule from Q6 to Q12 hours prn with plan to discontinue   Adding     hydroxyzine 50mg PO Q6 HRS PRN  for itching or anxiety   Patient verbally consenting to these changes.     Legal status: extended  Anticipate F/U: YES    Labs/tests ordered: NO   Phone calls: NO   Records reviewed: YES    Discussed patient with other provider: Dr Restrepo     Thank you for the consult.

## 2019-05-09 NOTE — PROGRESS NOTES
"Hospital Medicine Daily Progress Note    Date of Service  5/9/2019    Chief Complaint  58 y.o. female admitted 4/29/2019 with suicide attempt, alcohol intoxication/alcohol withdrawal      Interval Problem Update  5/7. Asking for pain medications for a chronic abdominal pain. Pain is out of proportion to exam.  5/8. Her \"pain\" has improved.   5/9. Not in pain. Did ADLs. Legal hold extended by psychiatry    Consultants/Specialty  Psychiatry    Code Status  Full code    Disposition  Psychiatry facility vs legal hold clearance    Review of Systems  Review of Systems   Constitutional: Negative for fever.   HENT: Negative for nosebleeds and sinus pain.    Eyes: Negative for blurred vision.   Respiratory: Negative for cough.    Cardiovascular: Negative for chest pain and orthopnea.   Gastrointestinal: Negative for nausea.   Genitourinary: Negative for dysuria and frequency.   Musculoskeletal: Negative for back pain and myalgias.   Skin: Negative for itching.   Neurological: Positive for weakness. Negative for dizziness and tremors.   Endo/Heme/Allergies: Does not bruise/bleed easily.        Physical Exam  Temp:  [36.4 °C (97.5 °F)-36.6 °C (97.8 °F)] 36.6 °C (97.8 °F)  Pulse:  [] 95  Resp:  [16-18] 18  BP: (125-144)/() 144/100  SpO2:  [92 %-99 %] 99 %    Physical Exam   Constitutional: She is oriented to person, place, and time. She appears well-nourished. No distress.   HENT:   Head: Normocephalic and atraumatic.   Mouth/Throat: No oropharyngeal exudate.   Eyes: Conjunctivae are normal.   Neck: Normal range of motion. Neck supple.   Cardiovascular: Regular rhythm and normal heart sounds.  Exam reveals no friction rub.    Pulmonary/Chest: Effort normal and breath sounds normal. No respiratory distress. She has no wheezes.   Abdominal: Soft. Bowel sounds are normal. She exhibits no distension. There is tenderness (occasional only, nonspecific).   Musculoskeletal: Normal range of motion. She exhibits no " tenderness.   Neurological: She is oriented to person, place, and time. She exhibits normal muscle tone.   Skin: Skin is warm and dry.   Psychiatric:   Odd behavior   Nursing note and vitals reviewed.      Fluids    Intake/Output Summary (Last 24 hours) at 05/09/19 1439  Last data filed at 05/09/19 1000   Gross per 24 hour   Intake              820 ml   Output                0 ml   Net              820 ml       Laboratory  Recent Labs      05/07/19   0349   WBC  7.6   RBC  3.58*   HEMOGLOBIN  11.3*   HEMATOCRIT  36.4*   MCV  101.7*   MCH  31.6   MCHC  31.0*   RDW  57.4*   PLATELETCT  261   MPV  10.3     Recent Labs      05/07/19   0349  05/07/19   1206   SODIUM  141  140   POTASSIUM  3.8  4.2   CHLORIDE  108  107   CO2  25  26   GLUCOSE  98  114*   BUN  17  17   CREATININE  0.60  0.77   CALCIUM  9.2  9.5                   Imaging  [id number] VIEW   Final Result      Nonobstructive bowel gas pattern. Moderate amount of stool throughout the colon.           Assessment/Plan  * Drug overdose, intentional (HCC)   Assessment & Plan    This patient presents with SI and intential drug overdose of prozac and lisinopril   IP psych consultation in the am   Legal hold  [facility]  Poison control case # [id number].  Ms. Marquis was here with a confirmed overdose of Unknown substance or drug; she has no other known overdoses.    Resolved.   Legal hold extended for her behavioral issues  Currently she seems cooperative and has no complaints.     Hypokalemia- (present on admission)   Assessment & Plan    Resolved.   Hypomagnesemia improved, started p.o. Supplement  Will f/u in am.      Bipolar 2 disorder (HCC)- (present on admission)   Assessment & Plan    History of, IP psych consulted  Legal hold extended     PTSD (post-traumatic stress disorder)- (present on admission)   Assessment & Plan    History of ip psych consultation     Candidal vulvovaginitis   Assessment & Plan    Not getting better with creams  Redness on inspection,  minimal discharge  Started fluconazole     UTI (urinary tract infection)   Assessment & Plan    Completed ceftriaxone.      Suicidal ideation   Assessment & Plan    Continue sitter  Legal hold  Psych consult     Anemia   Assessment & Plan    Most likely chronic, continue monitoring, no need for blood transfusion at this time.  Stable.     Hypoglycemia   Assessment & Plan    Resolved. Tolerating diet better.      Alcohol intoxication (HCC)   Assessment & Plan       Resolved.      Chronic pain- (present on admission)   Assessment & Plan    Nonspecific. She indicates she has abdominal pain and muscle pain  She refuses tramadol.  Ordered KUB as well as CMP and lipase  If no organic cause, will need to taper pain medications  Explained that her lipase is normal and that she has no acute pancreatitis.       VTE prophylaxis: Heparin SQ    Reviewed vitals, labs, imaging, staff notes.  Discussed assessment and plan with Ashleigh Marquis. Gave reassurance  Discussed with RN, KOLTON.

## 2019-05-09 NOTE — CARE PLAN
Problem: Safety  Goal: Will remain free from injury  Outcome: PROGRESSING AS EXPECTED  Patient safety sitter present, on legal hold.     Problem: Infection  Goal: Will remain free from infection  Outcome: PROGRESSING AS EXPECTED  Remains afebrile.     Problem: Pain Management  Goal: Pain level will decrease to patient's comfort goal  Outcome: PROGRESSING SLOWER THAN EXPECTED  Patient states chronic pain.

## 2019-05-09 NOTE — CARE PLAN
Problem: Safety  Goal: Will remain free from injury  Outcome: PROGRESSING AS EXPECTED  1 to 1 safety sitter at bedside. All dangerous unnecessary equipment removed from room     Problem: Venous Thromboembolism (VTW)/Deep Vein Thrombosis (DVT) Prevention:  Goal: Patient will participate in Venous Thrombosis (VTE)/Deep Vein Thrombosis (DVT)Prevention Measures  Outcome: PROGRESSING SLOWER THAN EXPECTED  Ppt refuses SCDs despite education but ambulates frequently up to bathroom

## 2019-05-10 PROCEDURE — 99232 SBSQ HOSP IP/OBS MODERATE 35: CPT | Mod: GC | Performed by: PSYCHIATRY & NEUROLOGY

## 2019-05-10 PROCEDURE — 99232 SBSQ HOSP IP/OBS MODERATE 35: CPT | Performed by: INTERNAL MEDICINE

## 2019-05-10 PROCEDURE — 700102 HCHG RX REV CODE 250 W/ 637 OVERRIDE(OP): Performed by: INTERNAL MEDICINE

## 2019-05-10 PROCEDURE — 700102 HCHG RX REV CODE 250 W/ 637 OVERRIDE(OP): Performed by: PSYCHIATRY & NEUROLOGY

## 2019-05-10 PROCEDURE — 700111 HCHG RX REV CODE 636 W/ 250 OVERRIDE (IP): Performed by: HOSPITALIST

## 2019-05-10 PROCEDURE — 770001 HCHG ROOM/CARE - MED/SURG/GYN PRIV*

## 2019-05-10 PROCEDURE — A9270 NON-COVERED ITEM OR SERVICE: HCPCS | Performed by: STUDENT IN AN ORGANIZED HEALTH CARE EDUCATION/TRAINING PROGRAM

## 2019-05-10 PROCEDURE — 700102 HCHG RX REV CODE 250 W/ 637 OVERRIDE(OP): Performed by: HOSPITALIST

## 2019-05-10 PROCEDURE — A9270 NON-COVERED ITEM OR SERVICE: HCPCS | Performed by: HOSPITALIST

## 2019-05-10 PROCEDURE — A9270 NON-COVERED ITEM OR SERVICE: HCPCS | Performed by: INTERNAL MEDICINE

## 2019-05-10 PROCEDURE — A9270 NON-COVERED ITEM OR SERVICE: HCPCS | Performed by: PSYCHIATRY & NEUROLOGY

## 2019-05-10 PROCEDURE — 700102 HCHG RX REV CODE 250 W/ 637 OVERRIDE(OP): Performed by: STUDENT IN AN ORGANIZED HEALTH CARE EDUCATION/TRAINING PROGRAM

## 2019-05-10 RX ORDER — ACETAMINOPHEN 325 MG/1
650 TABLET ORAL EVERY 4 HOURS PRN
Qty: 30 TAB | Refills: 0 | COMMUNITY
Start: 2019-05-10 | End: 2019-08-05

## 2019-05-10 RX ORDER — AMOXICILLIN 250 MG
2 CAPSULE ORAL 2 TIMES DAILY
Qty: 30 TAB | Refills: 0 | Status: SHIPPED | OUTPATIENT
Start: 2019-05-10 | End: 2019-08-05

## 2019-05-10 RX ORDER — NYSTATIN 100000 U/G
CREAM TOPICAL 2 TIMES DAILY
Qty: 1 TUBE | COMMUNITY
Start: 2019-05-10 | End: 2019-08-05

## 2019-05-10 RX ORDER — LACTOBACILLUS RHAMNOSUS GG 10B CELL
1 CAPSULE ORAL
Qty: 30 CAP | Refills: 0 | Status: SHIPPED | OUTPATIENT
Start: 2019-05-11 | End: 2019-08-05

## 2019-05-10 RX ORDER — POLYETHYLENE GLYCOL 3350 17 G/17G
POWDER, FOR SOLUTION ORAL
Refills: 3 | COMMUNITY
Start: 2019-05-10 | End: 2019-08-05

## 2019-05-10 RX ORDER — QUETIAPINE FUMARATE 50 MG/1
50 TABLET, FILM COATED ORAL
Qty: 30 TAB | Refills: 0 | Status: SHIPPED | OUTPATIENT
Start: 2019-05-10 | End: 2019-05-11

## 2019-05-10 RX ORDER — DOXEPIN HYDROCHLORIDE 10 MG/1
10 CAPSULE ORAL EVERY EVENING
Qty: 30 CAP | Refills: 0 | Status: SHIPPED | OUTPATIENT
Start: 2019-05-10 | End: 2019-08-05

## 2019-05-10 RX ORDER — ONDANSETRON 4 MG/1
4 TABLET, ORALLY DISINTEGRATING ORAL EVERY 4 HOURS PRN
Qty: 10 TAB | Refills: 0 | Status: SHIPPED | OUTPATIENT
Start: 2019-05-10 | End: 2019-08-05

## 2019-05-10 RX ORDER — FLUCONAZOLE 150 MG/1
150 TABLET ORAL
Qty: 2 TAB | Refills: 0 | Status: SHIPPED | OUTPATIENT
Start: 2019-05-14 | End: 2019-05-18

## 2019-05-10 RX ADMIN — PANCRELIPASE 3000 UNITS: 15000; 3000; 9500 CAPSULE, DELAYED RELEASE ORAL at 18:00

## 2019-05-10 RX ADMIN — OMEPRAZOLE 20 MG: 20 CAPSULE, DELAYED RELEASE ORAL at 06:00

## 2019-05-10 RX ADMIN — IBUPROFEN 400 MG: 800 TABLET, FILM COATED ORAL at 06:14

## 2019-05-10 RX ADMIN — HEPARIN SODIUM 5000 UNITS: 5000 INJECTION, SOLUTION INTRAVENOUS; SUBCUTANEOUS at 06:00

## 2019-05-10 RX ADMIN — PANCRELIPASE 3000 UNITS: 15000; 3000; 9500 CAPSULE, DELAYED RELEASE ORAL at 13:10

## 2019-05-10 RX ADMIN — NYSTATIN: 100000 CREAM TOPICAL at 17:51

## 2019-05-10 RX ADMIN — QUETIAPINE FUMARATE 50 MG: 25 TABLET ORAL at 21:56

## 2019-05-10 RX ADMIN — OXYCODONE HYDROCHLORIDE 2.5 MG: 5 TABLET ORAL at 06:13

## 2019-05-10 RX ADMIN — SENNOSIDES AND DOCUSATE SODIUM 2 TABLET: 8.6; 5 TABLET ORAL at 06:00

## 2019-05-10 RX ADMIN — SENNOSIDES AND DOCUSATE SODIUM 2 TABLET: 8.6; 5 TABLET ORAL at 17:49

## 2019-05-10 RX ADMIN — MAGNESIUM OXIDE TAB 400 MG (241.3 MG ELEMENTAL MG) 400 MG: 400 (241.3 MG) TAB at 17:49

## 2019-05-10 RX ADMIN — LORAZEPAM 1 MG: 1 TABLET ORAL at 20:20

## 2019-05-10 RX ADMIN — DOXEPIN HYDROCHLORIDE 10 MG: 10 CAPSULE ORAL at 21:56

## 2019-05-10 RX ADMIN — Medication 1 CAPSULE: at 09:32

## 2019-05-10 RX ADMIN — LISINOPRIL 10 MG: 10 TABLET ORAL at 17:49

## 2019-05-10 RX ADMIN — OXYCODONE HYDROCHLORIDE 2.5 MG: 5 TABLET ORAL at 17:56

## 2019-05-10 RX ADMIN — MAGNESIUM OXIDE TAB 400 MG (241.3 MG ELEMENTAL MG) 400 MG: 400 (241.3 MG) TAB at 06:00

## 2019-05-10 RX ADMIN — NYSTATIN: 100000 CREAM TOPICAL at 06:18

## 2019-05-10 RX ADMIN — IBUPROFEN 400 MG: 800 TABLET, FILM COATED ORAL at 17:56

## 2019-05-10 RX ADMIN — LISINOPRIL 10 MG: 10 TABLET ORAL at 06:19

## 2019-05-10 RX ADMIN — LORAZEPAM 1 MG: 1 TABLET ORAL at 06:14

## 2019-05-10 RX ADMIN — PANCRELIPASE 3000 UNITS: 15000; 3000; 9500 CAPSULE, DELAYED RELEASE ORAL at 09:32

## 2019-05-10 ASSESSMENT — ENCOUNTER SYMPTOMS
TREMORS: 0
MYALGIAS: 0
ORTHOPNEA: 0
BLURRED VISION: 0
BACK PAIN: 0
FEVER: 0
SINUS PAIN: 0
COUGH: 0
NAUSEA: 0
WEAKNESS: 1
BRUISES/BLEEDS EASILY: 0
DIZZINESS: 0
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
## 2019-05-10 NOTE — DISCHARGE PLANNING
Agency/Facility Name: [facility]  Spoke To: Dianelys   Outcome: Patient declined due to non-contracted Medicaid FFS insurance.      Agency/Facility Name: [facility]  Spoke To: Denise  Outcome: Patient declined - Patient has been admitted to this facility in the past and the treatment team feels she may be more successful elsewhere.      Agency/Facility Name: [facility]  Spoke To: hoda Joseph RN  Outcome: Referral not received. Sent new referral to [facility] at [phone]

## 2019-05-10 NOTE — PSYCHIATRY
BRIEF PSYCHIATRIC CONSULT NOTE: patient seen, full note to follow.  -Legal Hold:dc'd  -Sitter:no  -Restrictions:   -phone:yes   -visitors:yes   -personal items: yes   -finger foods required:    -personal/undergarments clothes:   -medical bed:yes   -Orders Placed: d/c legal hold  -Assessment/plan  BP 2 disorder   PTSD, trauma 1 year ago  Alcohol Use disorder, severe, with substance induced mood disorder    Continue seroquel 50 mg PO daily please provide only 5 days rx until psychiatry outpatient appointment  Benzo taper: Clonazepam 0.5 mg PO PRN daily for  please provide only 5 days rx until psychiatry outpatient appointment      Patient has meds in belongings, please limit Rx taking personal medications into account as patient has 2 recent OD attempt.    Recommend patient seek [facility] treatment and outpatient trauma therapy    Psychiatry appointment confirmed for 5.14.19    Psychiatry signing off

## 2019-05-10 NOTE — PROGRESS NOTES
Dr. Jim paged patient refusing heparin.   Dr. Jim aware, requests educating the patient already done, and documenting refusal.

## 2019-05-10 NOTE — PROGRESS NOTES
Patient has 1:1 patient safety sitter Gibson HUDSON at bedside has room safety checklist for behavioral health patient in use, patient is on a legal hold.

## 2019-05-10 NOTE — CARE PLAN
Problem: Infection  Goal: Will remain free from infection  Outcome: PROGRESSING AS EXPECTED  Afebrile.     Problem: Bowel/Gastric:  Goal: Normal bowel function is maintained or improved  Outcome: PROGRESSING AS EXPECTED  Bm yesterday.     Problem: Pain Management  Goal: Pain level will decrease to patient's comfort goal  Outcome: PROGRESSING AS EXPECTED  Has multiple areas of pain but with relief from prn and scheduled medication.

## 2019-05-10 NOTE — DISCHARGE PLANNING
Anticipated Discharge Disposition: Shelter    Action: LSW met with psych who stated that they are lifting the Pt legal hold and that a condition of her discharge is that she has an appointment scheduled with her psychiatrist Dr. Tani Baeza. LSW was asked to call with the Pt for an appointment. Pt contacted Dr. Baeza office at [phone] and scheduled an appoint for 5/14/19 at 3:20. LSW confirmed appointment date and time with Dr. Baeza's office. Pt stated she will be going to the shelter at [address] and that she will need transportation. Pt requested a taxi voucher.     Barriers to Discharge: medical clearance    Plan: DC to shelter

## 2019-05-11 ENCOUNTER — PATIENT OUTREACH (OUTPATIENT)
Dept: HEALTH INFORMATION MANAGEMENT | Facility: OTHER | Age: 59
End: 2019-05-11

## 2019-05-11 VITALS
HEART RATE: 78 BPM | HEIGHT: 64 IN | DIASTOLIC BLOOD PRESSURE: 61 MMHG | SYSTOLIC BLOOD PRESSURE: 120 MMHG | OXYGEN SATURATION: 97 % | WEIGHT: 179.9 LBS | RESPIRATION RATE: 18 BRPM | BODY MASS INDEX: 30.71 KG/M2 | TEMPERATURE: 97.9 F

## 2019-05-11 PROCEDURE — A9270 NON-COVERED ITEM OR SERVICE: HCPCS | Performed by: HOSPITALIST

## 2019-05-11 PROCEDURE — 99239 HOSP IP/OBS DSCHRG MGMT >30: CPT | Performed by: INTERNAL MEDICINE

## 2019-05-11 PROCEDURE — 700102 HCHG RX REV CODE 250 W/ 637 OVERRIDE(OP): Performed by: HOSPITALIST

## 2019-05-11 PROCEDURE — 700102 HCHG RX REV CODE 250 W/ 637 OVERRIDE(OP): Performed by: INTERNAL MEDICINE

## 2019-05-11 PROCEDURE — A9270 NON-COVERED ITEM OR SERVICE: HCPCS | Performed by: INTERNAL MEDICINE

## 2019-05-11 RX ORDER — CLONAZEPAM 1 MG/1
0.5 TABLET ORAL
Qty: 5 TAB | Refills: 0 | Status: SHIPPED | OUTPATIENT
Start: 2019-05-11 | End: 2019-05-16

## 2019-05-11 RX ORDER — QUETIAPINE FUMARATE 50 MG/1
50 TABLET, FILM COATED ORAL
Qty: 5 TAB | Refills: 0 | Status: SHIPPED | OUTPATIENT
Start: 2019-05-11 | End: 2019-05-16

## 2019-05-11 RX ORDER — QUETIAPINE FUMARATE 50 MG/1
50 TABLET, FILM COATED ORAL
Qty: 5 TAB | Refills: 0 | Status: SHIPPED | OUTPATIENT
Start: 2019-05-11 | End: 2019-05-11

## 2019-05-11 RX ADMIN — NYSTATIN: 100000 CREAM TOPICAL at 06:00

## 2019-05-11 RX ADMIN — PANCRELIPASE 3000 UNITS: 15000; 3000; 9500 CAPSULE, DELAYED RELEASE ORAL at 09:09

## 2019-05-11 RX ADMIN — MAGNESIUM OXIDE TAB 400 MG (241.3 MG ELEMENTAL MG) 400 MG: 400 (241.3 MG) TAB at 05:18

## 2019-05-11 RX ADMIN — LISINOPRIL 10 MG: 10 TABLET ORAL at 05:17

## 2019-05-11 RX ADMIN — Medication 1 CAPSULE: at 09:09

## 2019-05-11 RX ADMIN — SENNOSIDES AND DOCUSATE SODIUM 2 TABLET: 8.6; 5 TABLET ORAL at 05:18

## 2019-05-11 RX ADMIN — OXYCODONE HYDROCHLORIDE 2.5 MG: 5 TABLET ORAL at 05:23

## 2019-05-11 RX ADMIN — IBUPROFEN 400 MG: 800 TABLET, FILM COATED ORAL at 05:22

## 2019-05-11 RX ADMIN — OMEPRAZOLE 20 MG: 20 CAPSULE, DELAYED RELEASE ORAL at 05:17

## 2019-05-11 NOTE — PROGRESS NOTES
[facility] was [facility] to shelter via [facility] as set up by CM, further care as an outpatient.

## 2019-05-11 NOTE — CARE PLAN
Problem: Venous Thromboembolism (VTW)/Deep Vein Thrombosis (DVT) Prevention:  Goal: Patient will participate in Venous Thrombosis (VTE)/Deep Vein Thrombosis (DVT)Prevention Measures  Outcome: PROGRESSING SLOWER THAN EXPECTED  Pr refused SCDs and heparin. Provided education to pt and still refused. Will try again later.     Problem: Pain Management  Goal: Pain level will decrease to patient's comfort goal  provided pt with pain meds per MAR order.

## 2019-05-11 NOTE — PROGRESS NOTES
"Hospital Medicine Daily Progress Note    Date of Service  5/10/2019    Chief Complaint  58 y.o. female admitted 4/29/2019 with suicide attempt, alcohol intoxication/alcohol withdrawal      Interval Problem Update  5/7. Asking for pain medications for a chronic abdominal pain. Pain is out of proportion to exam.  5/8. Her \"pain\" has improved.   5/9. Not in pain. Did ADLs. Legal hold extended by psychiatry  5/10. Good mood as legal hold lifted. She however said she has belly pain when she walks. Psychiatry did reiterate to minimize precription meds because of OD potential.    Consultants/Specialty  Psychiatry    Code Status  Full code    Disposition  Psychiatry facility vs legal hold clearance  On 5/10, legal hold d.[email or web address]    Review of Systems  Review of Systems   Constitutional: Negative for fever.   HENT: Negative for nosebleeds and sinus pain.    Eyes: Negative for blurred vision.   Respiratory: Negative for cough.    Cardiovascular: Negative for chest pain and orthopnea.   Gastrointestinal: Negative for nausea.   Genitourinary: Negative for dysuria and frequency.   Musculoskeletal: Negative for back pain and myalgias.   Skin: Negative for itching.   Neurological: Positive for weakness. Negative for dizziness and tremors.   Endo/Heme/Allergies: Does not bruise/bleed easily.        Physical Exam  Temp:  [36 °C (96.8 °F)-36.7 °C (98.1 °F)] 36.3 °C (97.3 °F)  Pulse:  [77-86] 86  Resp:  [16] 16  BP: (106-138)/(57-96) 134/73  SpO2:  [91 %-96 %] 96 %    Physical Exam   Constitutional: She is oriented to person, place, and time. She appears well-nourished. No distress.   HENT:   Head: Normocephalic and atraumatic.   Mouth/Throat: No oropharyngeal exudate.   Eyes: Conjunctivae are normal.   Neck: Normal range of motion. Neck supple.   Cardiovascular: Regular rhythm and normal heart sounds.  Exam reveals no friction rub.    Pulmonary/Chest: Effort normal and breath sounds normal. No respiratory distress. She has no wheezes. "   Abdominal: Soft. Bowel sounds are normal. She exhibits no distension. There is tenderness (occasional only, nonspecific, pain out of proportion to exam).   Musculoskeletal: Normal range of motion. She exhibits no tenderness.   Neurological: She is oriented to person, place, and time. She exhibits normal muscle tone.   Skin: Skin is warm and dry.   Psychiatric:   Odd behavior   Nursing note and vitals reviewed.      Fluids    Intake/Output Summary (Last 24 hours) at 05/10/19 1739  Last data filed at 05/10/19 0800   Gross per 24 hour   Intake              580 ml   Output                0 ml   Net              580 ml       Laboratory                        Imaging  [id number] [id number]   Final Result      Nonobstructive bowel gas pattern. Moderate amount of stool throughout the colon.           Assessment/Plan  * Drug overdose, intentional (HCC)   Assessment & Plan    This patient presents with SI and intential drug overdose of prozac and lisinopril   IP psych consultation in the am   Legal hold  [facility]  Poison control case # [id number].  Ms. Marquis was here with a confirmed overdose of Unknown substance or drug; she has no other known overdoses.    Resolved.   Legal hold extended for her behavioral issues  Currently she seems cooperative and has no complaints.  On 5/10 Legal hold lifted  Avoid prescription medications     Hypokalemia- (present on admission)   Assessment & Plan    Resolved.   Hypomagnesemia improved, started p.o. Supplement  Will f/u in am.      Bipolar 2 disorder (HCC)- (present on admission)   Assessment & Plan    History of, IP psych consulted  Legal hold extended     PTSD (post-traumatic stress disorder)- (present on admission)   Assessment & Plan    History of ip psych consultation     Candidal vulvovaginitis   Assessment & Plan    Not getting better with creams  Redness on inspection, minimal discharge  Started fluconazole     UTI (urinary tract infection)   Assessment & Plan    Completed  ceftriaxone.      Suicidal ideation   Assessment & Plan    Continue sitter  Legal hold  Psych consult     Anemia   Assessment & Plan    Most likely chronic, continue monitoring, no need for blood transfusion at this time.  Stable.     Hypoglycemia   Assessment & Plan    Resolved. Tolerating diet better.      Alcohol intoxication (HCC)   Assessment & Plan       Resolved.      Chronic pain- (present on admission)   Assessment & Plan    Nonspecific. She indicates she has abdominal pain and muscle pain  She refuses tramadol.  Ordered KUB as well as CMP and lipase  If no organic cause, will need to taper pain medications  Explained that her lipase is normal and that she has no acute pancreatitis.       VTE prophylaxis: Heparin SQ    Reviewed vitals, labs, imaging, staff notes.  Discussed assessment and plan with Ashleigh Marquis. Gave reassurance  Discussed with RN, KOLTON.

## 2019-05-11 NOTE — PROGRESS NOTES
"/61   Pulse 78   Temp 36.6 °C (97.9 °F) (Temporal)   Resp 18   Ht 1.626 m (5' 4\")   Wt 81.6 kg (179 lb 14.3 oz)   SpO2 97%     Patient discharged to shelter, with [facility] as per / scheduled ride.  PIV removed, patient is up to date on vaccines.  Discharge instructions reviewed and signed as well as informed consent for controlled substance use.  Patient has follow up appointment with Dr. Baeza psychiatry and will schedule or walk in [facility] for follow up.  Patient has RX scripts for discharge as well.  Further care as an outpatient.   "

## 2019-05-11 NOTE — CARE PLAN
Problem: Safety  Goal: Will remain free from injury  Outcome: PROGRESSING AS EXPECTED  Patient calls for help appropriately.     Problem: Infection  Goal: Will remain free from infection  Outcome: PROGRESSING AS EXPECTED  Afebrile discharged with antifungal.

## 2019-05-11 NOTE — DISCHARGE INSTRUCTIONS
Discharge Instructions    Discharged to Shelter by taxi with escort. Discharged via wheelchair, hospital escort: Yes.  Special equipment needed: Not Applicable    Be sure to schedule a follow-up appointment with your primary care doctor or any specialists as instructed.     Discharge Plan: To shelter at [address] [place] [address] via Taxi voucher no stops.    Diet Plan: Regular  Activity Level: As Tolerated.  Confirmed Follow up Appointment: Appointment Scheduled Dr. Aryan Freire 5/14/19 at 3:20pm please arrive 15 minutes early. Phone [phone].   Confirmed Symptoms Management: Discussed  Medication Reconciliation Updated: Yes  Influenza Vaccine Indication: Not indicated: Previously immunized this influenza season and > 8 years of age  Influenza Vaccine Given - only chart on this line when given: Influenza Vaccine Given (See MAR)    I understand that a diet low in cholesterol, fat, and sodium is recommended for good health. Unless I have been given specific instructions below for another diet, I accept this instruction as my diet prescription.     Other diet: Regular    Special Instructions: None    · Is patient discharged on Warfarin / Coumadin?   No     Depression / Suicide Risk    As you are discharged from this [facility] facility, it is important to learn how to keep safe from harming yourself.    Recognize the warning signs:  · Abrupt changes in personality, positive or negative- including increase in energy   · Giving away possessions  · Change in eating patterns- significant weight changes-  positive or negative  · Change in sleeping patterns- unable to sleep or sleeping all the time   · Unwillingness or inability to communicate  · Depression  · Unusual sadness, discouragement and loneliness  · Talk of wanting to die  · Neglect of personal appearance   · Rebelliousness- reckless behavior  · Withdrawal from people/activities they love  · Confusion- inability to concentrate     If you or a loved one observes any of  these behaviors or has concerns about self-harm, here's what you can do:  · Talk about it- your feelings and reasons for harming yourself  · Remove any means that you might use to hurt yourself (examples: pills, rope, extension cords, firearm)  · Get professional help from the community (Mental Health, Substance Abuse, psychological counseling)  · Do not be alone:Call your Safe Contact- someone whom you trust who will be there for you.  · Call your local CRISIS HOTLINE 839-7439 or 387-814-8594  · Call your local Children's Mobile Crisis Response Team [place] (468) 232-4476 or www.XOG  · Call the toll free National Suicide Prevention Hotlines   · National Suicide Prevention Lifeline 232-707-KXLL (5243)  · National Hope Line Network 800-SUICIDE (892-8168)

## 2019-05-11 NOTE — PROGRESS NOTES
"Spoke with Dr. Jim for patients concern for being discharged, as per Md patient will be discharged tomorrow likely to shelter.  Requests notifying evening SW for early discharge tomorrow. Patient updated and satisfied with plan of care.     Last note from SW indicates plan for \"shelter at [address] [place] [address] and that she will need transportation. Pt requested a taxi voucher.\"    Spoke with on call KOLTON Allen and agrees patient just needs to arrive early as they do not take reservations will endorse to night shift RN.   "

## 2019-05-11 NOTE — DISCHARGE SUMMARY
Discharge Summary    CHIEF COMPLAINT ON ADMISSION  Chief Complaint   Patient presents with   • Suicidal Ideation   • Drug Overdose       Reason for Admission  EMS     Admission Date  4/29/2019    CODE STATUS  Full Code    HPI & HOSPITAL COURSE  This is a 58 y.o. female here with Suicidal Ideation and Drug Overdose    Please review Dr. Eliana Arce M.D. notes for further details of history of present illness, past medical/social/family histories, allergies and medications. Please review Dr. Restrepo, Psychiatry consultation notes.    58 y.o. female admitted 4/29/2019 with suicide attempt, alcohol intoxication/alcohol withdrawal. She tried to OD on Prozac and lisinopril. Psychiatry consulted and legal hold placed. Behavioral modification and adjustment of psychotropics were done. She improved. Legal hold lifted by Psychiatry on 5/10. Psychiatry recommended seroquel 50 mg PO daily (provided only 5 days rx until psychiatry outpatient appointment)  Benzo taper with clonazepam 0.5 mg PO PRN daily, provided only 5 days rx until psychiatry outpatient appointment). She is scheduled to See Psychiatry in 5 days.    She was also given cream and 3 dose course of fluconazxole for Candidal vaginitis. She completed a course of Rocephin for UTI. She completed observation and PRN Ativan for withdrawal. She is advised not to drink alcohol. Alcohol cessation resources provided by [facility]. She has nonspecific chronic pain which improved.     She will be discharged to the shelter, arranged by [facility].     At discharge date, Ashleigh Marquis afebrile and hemodynamically stable.  Ashleigh Marquis wanted to be discharged today.    Discharge Physical Exam  Constitutional: She is oriented to person, place, and time. She appears well-nourished. No distress.   HENT:   Head: Normocephalic and atraumatic.   Mouth/Throat: No oropharyngeal exudate.   Eyes: Conjunctivae are normal.   Neck: Normal range of motion. Neck supple.   Cardiovascular:  Regular rhythm and normal heart sounds.  Exam reveals no friction rub.    Pulmonary/Chest: Effort normal and breath sounds normal. No respiratory distress. She has no wheezes.   Abdominal: Soft. Bowel sounds are normal. She exhibits no distension. No tenderness   Musculoskeletal: Normal range of motion. She exhibits no tenderness.   Neurological: She is oriented to person, place, and time. She exhibits normal muscle tone.   Skin: Skin is warm and dry.   Psychiatric:   Improved behavior  Nursing note and vitals reviewed.    Imaging  [id number] VIEW   Final Result      Nonobstructive bowel gas pattern. Moderate amount of stool throughout the colon.                   Therefore, she is discharged in good and stable condition to home with close outpatient follow-up.    The patient met 2-midnight criteria for an inpatient stay at the time of discharge.    Discharge Date  5/11/19    FOLLOW UP ITEMS POST DISCHARGE      DISCHARGE DIAGNOSES  Principal Problem:    Drug overdose, intentional (HCC) POA: Unknown  Active Problems:    PTSD (post-traumatic stress disorder) POA: Yes    Bipolar 2 disorder (HCC) POA: Yes    Hypokalemia POA: Yes    Chronic pain POA: Yes    Alcohol intoxication (HCC) POA: Unknown    Hypoglycemia POA: Unknown    Anemia POA: Unknown    Suicidal ideation POA: Unknown    UTI (urinary tract infection) POA: Unknown    Candidal vulvovaginitis POA: Unknown      FOLLOW UP  No future appointments.  Cornelius Baeza M.D.  [address] [place] [id number]  [phone]    Go on 5/14/2019  At 3:20  PM.  For Follow Up Care.   Assign and follow up with primary provider or discharge clinic physician in 1-2 weeks  Follow up with Psychiatry and step down program/psychology in 1 week      MEDICATIONS ON DISCHARGE     Medication List      START taking these medications      Instructions   acetaminophen 325 MG Tabs  Commonly known as:  TYLENOL   Take 2 Tabs by mouth every four hours as needed.  Dose:  650 mg     doxepin 10 MG  Caps  Commonly known as:  SINEQUAN   Take 1 Cap by mouth every evening.  Dose:  10 mg     fluconazole 150 MG tablet  Start taking on:  5/14/2019  Commonly known as:  DIFLUCAN   Take 1 Tab by mouth every 72 hours for 2 doses.  Dose:  150 mg     lactobacillus rhamnosus Caps capsule   Take 1 Cap by mouth every morning with breakfast.  Dose:  1 Cap     magnesium oxide 400 (241.3 Mg) MG Tabs tablet  Commonly known as:  MAG-OX   Take 1 Tab by mouth 2 Times a Day.  Dose:  400 mg     nystatin 397456 UNIT/GM Crea topical cream  Commonly known as:  MYCOSTATIN   Apply  to affected area(s) 2 Times a Day.     ondansetron 4 MG Tbdp  Commonly known as:  ZOFRAN ODT   Take 1 Tab by mouth every four hours as needed for Nausea (give PO if no IV route available).  Dose:  4 mg     pancrelipase (Lip-Prot-Amyl) 3451-2841 units Cpep  Commonly known as:  CREON 3000   Doctor's comments:  Follow up with your primary or GI provider  Take 1 Cap by mouth 3 times a day, with meals.  Dose:  1 Cap     polyethylene glycol/lytes Pack  Commonly known as:  MIRALAX   Take  by mouth 1 time daily as needed (if sennosides and docusate ineffective after 24 hours).     quetiapine 50 MG tablet  Commonly known as:  SEROQUEL   Doctor's comments:  Refills per Psychiatry  Take 1 Tab by mouth every bedtime for 5 days.  Dose:  50 mg     senna-docusate 8.6-50 MG Tabs  Commonly known as:  PERICOLACE or SENOKOT S   Doctor's comments:  Stop if diarrhea  Take 2 Tabs by mouth 2 Times a Day.  Dose:  2 Tab        CHANGE how you take these medications      Instructions   clonazePAM 1 MG Tabs  What changed:  · when to take this  · reasons to take this  Commonly known as:  KLONOPIN   Doctor's comments:  Per Psychiatry  Take 0.5 Tabs by mouth 1 time daily as needed (anxiety) for up to 5 days.  Dose:  0.5 mg        CONTINUE taking these medications      Instructions   carBAMazepine 200 MG Tabs  Commonly known as:  TEGRETOL   Take 200 mg by mouth 2 Times a Day.  Dose:  200 mg      lisinopril 10 MG Tabs  Commonly known as:  PRINIVIL   Take 1 Tab by mouth every day.  Dose:  10 mg     omeprazole 20 MG delayed-release capsule  Commonly known as:  PRILOSEC   Take 1 Cap by mouth every day.  Dose:  20 mg     PROZAC 40 MG capsule  Generic drug:  fluoxetine   Take 40 mg by mouth every day.  Dose:  40 mg            Allergies  Allergies   Allergen Reactions   • Sulfa Drugs Vomiting   • Toradol Vomiting   • Gabapentin      Bowel incontinence     • Amoxicillin Rash     Reported by NAIDA on arrival to ED    Pt states she takes PCN 10/3       DIET  Orders Placed This Encounter   Procedures   • Diet Order Regular (No Sharps)     Paperware only on meal tray.     Standing Status:   Standing     Number of Occurrences:   1     Order Specific Question:   Diet:     Answer:   Regular [1]     Comments:   No Sharps       ACTIVITY  As tolerated    CONSULTATIONS  Psychiatry    PROCEDURES  Ct-head W/o    Result Date: 4/22/2019 4/22/2019 4:19 AM HISTORY/REASON FOR EXAM:  Altered level of consciousness (LOC), unexplained. TECHNIQUE/EXAM DESCRIPTION AND NUMBER OF VIEWS: CT of the head without contrast. The study was performed on a GE CT scanner. Contiguous 5 mm axial sections were obtained from the skull base through the vertex. Up to date radiation dose reduction adjustments have been utilized to meet ALARA standards for radiation dose reduction. COMPARISON:  03/03/2019 FINDINGS: The calvariae and skull base are unremarkable. There are no extraaxial fluid collections. There is a pattern of cerebral atrophy manifest as enlargement of sulcal markings and ventricular prominence.  The ventricular system and basal cisterns are otherwise unremarkable. There are areas of hypodensity in the white matter most consistent with small vessel ischemic change versus demyelination or gliosis. There are no hemorrhagic lesions. There are no mass effects or shift of midline structures. The brainstem and posterior fossa structures are  unremarkable. The paranasal sinuses and mastoids in the field of view are unremarkable.     1. Cerebral atrophy. 2. White matter lucencies most consistent with small vessel ischemic change versus demyelination or gliosis. 3. Otherwise, Head CT without contrast with no acute findings. No evidence of acute cerebral infarction, hemorrhage or mass lesion.    [id number] View    Result Date: 5/7/2019 5/7/2019 2:30 PM HISTORY/REASON FOR EXAM:  Upper abdominal pain, history of pancreatitis. TECHNIQUE/EXAM DESCRIPTION AND NUMBER OF VIEWS:  1 view(s) of the abdomen. COMPARISON: None FINDINGS: Nonobstructive bowel gas pattern. Moderate amount of stool throughout the colon. No abnormal calcifications projecting over the field of view. No acute fracture. Lung bases are clear.     Nonobstructive bowel gas pattern. Moderate amount of stool throughout the colon.    Dx-ankle 3+ Views Right    Result Date: 4/27/2019 4/27/2019 8:18 PM HISTORY/REASON FOR EXAM:  Pain/Deformity Following Trauma c/o right ankle pain after fall TECHNIQUE/EXAM DESCRIPTION AND NUMBER OF VIEWS:  3 views of the RIGHT ankle. COMPARISON: 9/20/2018. FINDINGS: Acute nondisplaced oblique fracture of the distal fibula. Mild overlying soft tissue swelling. Ankle mortise appears intact. No joint arthropathy.     No acute osseous abnormality.    Dx-chest-portable (1 View)    Result Date: 4/23/2019 4/23/2019 3:56 AM HISTORY/REASON FOR EXAM:  For indication of respiratory failure TECHNIQUE/EXAM DESCRIPTION AND NUMBER OF VIEWS: Single portable view of the chest. COMPARISON: Yesterday FINDINGS: Hardware is stably positioned in its visualized extent. HEART: Stable size. LUNGS: Lung volumes are low. There are bibasilar opacities. PLEURA: No effusion or pneumothorax.     Decreased bibasilar atelectasis    Dx-chest-portable (1 View)    Result Date: 4/22/2019 4/22/2019 5:10 AM HISTORY/REASON FOR EXAM:  Line placement TECHNIQUE/EXAM DESCRIPTION AND NUMBER OF VIEWS: Single  portable view of the chest. COMPARISON: 4/22/2019 FINDINGS: There has been interval insertion of a central catheter which terminates with the tip projecting over the expected region of the superior vena cava. Other hardware is in unchanged position in its visualized extent. HEART: Stable size. LUNGS: Lung volumes are low. There are bibasilar opacities. PLEURA: No effusion or pneumothorax.     1.  No visible pneumothorax following RIGHT neck catheter placement 2.  Bibasilar underinflation atelectasis which could obscure an additional process. This has increased.    Dx-chest-portable (1 View)    Result Date: 4/22/2019 4/22/2019 2:27 AM HISTORY/REASON FOR EXAM:  Intubation TECHNIQUE/EXAM DESCRIPTION AND NUMBER OF VIEWS: Single portable view of the chest. COMPARISON: 04/01/2019 FINDINGS: Endotracheal tube terminates above the marc. Enteric tube traverses the thorax. HEART: Stable size. LUNGS: No areas of air space disease are demonstrated. PLEURA: No effusion or pneumothorax.     Line and tube position as described above      LABORATORY  Lab Results   Component Value Date    SODIUM 140 05/07/2019    POTASSIUM 4.2 05/07/2019    CHLORIDE 107 05/07/2019    CO2 26 05/07/2019    GLUCOSE 114 (H) 05/07/2019    BUN 17 05/07/2019    CREATININE 0.77 05/07/2019    CREATININE 0.8 05/01/2009        Lab Results   Component Value Date    WBC 7.6 05/07/2019    HEMOGLOBIN 11.3 (L) 05/07/2019    HEMATOCRIT 36.4 (L) 05/07/2019    PLATELETCT 261 05/07/2019        Total time of the discharge process exceeds 40 minutes.

## 2019-05-12 ENCOUNTER — HOSPITAL ENCOUNTER (EMERGENCY)
Dept: HOSPITAL 8 - ED | Age: 59
Discharge: HOME | End: 2019-05-12
Payer: MEDICAID

## 2019-05-12 VITALS — HEIGHT: 68 IN | BODY MASS INDEX: 24.39 KG/M2 | WEIGHT: 160.94 LBS

## 2019-05-12 VITALS — DIASTOLIC BLOOD PRESSURE: 70 MMHG | SYSTOLIC BLOOD PRESSURE: 138 MMHG
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
## 2019-05-12 DIAGNOSIS — S82.831A: Primary | ICD-10-CM
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
## 2019-05-12 DIAGNOSIS — F10.20: ICD-10-CM

## 2019-05-12 DIAGNOSIS — E11.9: ICD-10-CM
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
## 2019-05-12 PROCEDURE — 99283 EMERGENCY DEPT VISIT LOW MDM: CPT

## 2019-05-12 PROCEDURE — 29515 APPLICATION SHORT LEG SPLINT: CPT

## 2019-05-13 ENCOUNTER — APPOINTMENT (OUTPATIENT)
Dept: RADIOLOGY | Facility: MEDICAL CENTER | Age: 59
End: 2019-05-13
Attending: EMERGENCY MEDICINE
Payer: MEDICAID

## 2019-05-13 ENCOUNTER — HOSPITAL ENCOUNTER (EMERGENCY)
Facility: MEDICAL CENTER | Age: 59
End: 2019-05-14
Attending: EMERGENCY MEDICINE
Payer: MEDICAID

## 2019-05-13 DIAGNOSIS — S82.90XA: ICD-10-CM

## 2019-05-13 PROCEDURE — 99284 EMERGENCY DEPT VISIT MOD MDM: CPT

## 2019-05-13 PROCEDURE — 73610 X-RAY EXAM OF ANKLE: CPT | Mod: RT

## 2019-05-13 ASSESSMENT — PAIN SCALES - WONG BAKER: WONGBAKER_NUMERICALRESPONSE: DOESN'T HURT AT ALL

## 2019-05-14 VITALS
DIASTOLIC BLOOD PRESSURE: 95 MMHG | SYSTOLIC BLOOD PRESSURE: 146 MMHG | TEMPERATURE: 96.9 F | BODY MASS INDEX: 30.71 KG/M2 | OXYGEN SATURATION: 94 % | WEIGHT: 179.9 LBS | HEIGHT: 64 IN | HEART RATE: 99 BPM | RESPIRATION RATE: 18 BRPM

## 2019-05-14 NOTE — ED NOTES
Assist RN: BAN walking boot provided to patient as per ERP Dr. Loomsi. Patient tolerated placement with no complaints of pain/disomfort. Denies needs at this time. Call bell within reach.

## 2019-05-14 NOTE — ED TRIAGE NOTES
"Ashleigh Eve Kandis  58 y.o.  Chief Complaint   Patient presents with   • ETOH Intoxication     hx. ETOH abuse, when asked how much she drank tonight patient replied \"not enough\"   • Leg Pain     RLE, diagnosed with fibula fracture yesterday at [facility], RLE acep wrap and splint in place upon arrival to ED     BIB EMS to triage via wheelchair for above. Own crutch x 1 with patient upon arrival to ED.    No behavioral pain indicators noted.    Patient oriented x 4 but drowsy, falling asleep during triage RN assessment.    Triage process explained to patient, apologized for wait time, and returned to [facility].  "

## 2019-05-14 NOTE — ED PROVIDER NOTES
"ED Provider Note    CHIEF COMPLAINT  Chief Complaint   Patient presents with   • ETOH Intoxication     hx. ETOH abuse, when asked how much she drank tonight patient replied \"not enough\"   • Leg Pain     RLE, diagnosed with fibula fracture yesterday at [facility], RLE acep wrap and splint in place upon arrival to ED       HPI  Ashleigh Marquis is a 58 y.o. female who presents intoxicated.  The patient presents the emerge department epigastric discomfort and right lower extremity pain.  She has a posterior splint in place to the right leg.  She states she has an ankle fracture diagnosed at [facility].  She also presents with one crutch.  She is clearly been ambulating on the splint.  She states she has epigastric discomfort.  She has not had any recent vomiting or fevers.  She is intoxicated and difficult to interview.    REVIEW OF SYSTEMS  See HPI for further details. All other systems are negative.     PAST MEDICAL HISTORY  Past Medical History:   Diagnosis Date   • Cancer (HCC) 1981    cervical   • Alcoholism (HCC)    • Anxiety    • Arthritis    • ASTHMA    • Chronic low back pain    • Congestive heart failure (HCC)    • Depression    • EtOH dependence (HCC)    • Fall     alcohol related   • GERD (gastroesophageal reflux disease)    • HTN    • Hypertension    • Indigestion     GERD   • Muscle disorder    • OSTEOPOROSIS    • Other specified symptom associated with female genital organs     \"I have fibroids bad\"\"   • Psychiatric disorder    • PTSD (post-traumatic stress disorder)    • Renal disorder     Hx of stones   • Seizure (HCC)     several years ago r/t alcohol withdrawl   • Ulcer    • Vitamin D deficiency        FAMILY HISTORY  [unfilled]    SOCIAL HISTORY  Social History     Social History   • Marital status:      Spouse name: N/A   • Number of children: N/A   • Years of education: N/A     Social History Main Topics   • Smoking status: Current Every Day Smoker     Packs/day: 0.50     Years: 20.00     " Types: Cigarettes   • Smokeless tobacco: Never Used      Comment: 1/2 pack per day   • Alcohol use Yes      Comment: vodka, heavy use   • Drug use: No   • Sexual activity: No     Other Topics Concern   • Not on file     Social History Narrative    ** Merged History Encounter **            SURGICAL HISTORY  Past Surgical History:   Procedure Laterality Date   • GASTROSCOPY-ENDO N/A 10/3/2018    Procedure: GASTROSCOPY-ENDO;  Surgeon: Ben Negro M.D.;  Location: [facility] [place];  Service: Gastroenterology   • CYSTOSCOPY STENT PLACEMENT  11/9/2010    Performed by ANGEL HARRIS at [facility]   • URETEROSCOPY  11/9/2010    Performed by ANGEL HARRIS at [facility]   • LASERTRIPSY  11/9/2010    Performed by ANGEL HARRIS at [facility]   • STENT REMOVAL  11/9/2010    Performed by ANGEL HARRIS at [facility]   • CYSTO STENT PLACEMNT PRE SURG  10/7/2010    Performed by ANGEL HARRIS at [facility]   • HERNIA REPAIR  1977       CURRENT MEDICATIONS  Home Medications     Reviewed by Lisa Pena R.N. (Registered Nurse) on 05/13/19 at 2306  Med List Status: Partial   Medication Last Dose Status   acetaminophen (TYLENOL) 325 MG Tab  Active   carBAMazepine (TEGRETOL) 200 MG Tab  Active   clonazePAM (KLONOPIN) 1 MG Tab  Active   doxepin (SINEQUAN) 10 MG Cap  Active   fluconazole (DIFLUCAN) 150 MG tablet  Active   fluoxetine (PROZAC) 40 MG capsule  Active   lactobacillus rhamnosus (CULTURELLE) Cap capsule  Active   lisinopril (PRINIVIL) 10 MG Tab  Active   magnesium oxide (MAG-OX) 400 (241.3 Mg) MG Tab tablet  Active   nystatin (MYCOSTATIN) 035934 UNIT/GM Cream topical cream  Active   omeprazole (PRILOSEC) 20 MG delayed-release capsule  Active   ondansetron (ZOFRAN ODT) 4 MG TABLET DISPERSIBLE  Active   pancrelipase, Lip-Prot-Amyl, (CREON 3000) 4629-0851 units Cap DR Particles  Active   polyethylene glycol/lytes (MIRALAX) Pack  Active  "  quetiapine (SEROQUEL) 50 MG tablet  Active   senna-docusate (PERICOLACE OR SENOKOT S) 8.6-50 MG Tab  Active                ALLERGIES  Allergies   Allergen Reactions   • Sulfa Drugs Vomiting   • Toradol Vomiting   • Gabapentin      Bowel incontinence     • Amoxicillin Rash     Reported by [facility]IDA on arrival to ED    Pt states she takes PCN 10/3       PHYSICAL EXAM  VITAL SIGNS: /75   Pulse (!) 103   Temp 36.1 °C (96.9 °F) (Temporal)   Resp 14   Ht 1.626 m (5' 4\")   Wt 81.6 kg (179 lb 14.3 oz)   LMP 11/02/2015   SpO2 93%   BMI 30.88 kg/m²       Constitutional: Unkempt and intoxicated  HENT: Normocephalic, Atraumatic, Bilateral external ears normal, Oropharynx moist, No oral exudates, Nose normal.   Eyes: PERRLA, EOMI, Conjunctiva normal, No discharge.   Neck: Normal range of motion, No tenderness, Supple, No stridor.   Lymphatic: No lymphadenopathy noted.   Cardiovascular: Lightly tachycardic heart rate, Normal rhythm, No murmurs, No rubs, No gallops.   Thorax & Lungs: Normal breath sounds, No respiratory distress, No wheezing, No chest tenderness.   Abdomen: Bowel sounds normal, Soft, No tenderness, No masses, No pulsatile masses.   Skin: Warm, Dry, No erythema, No rash.   Back: No tenderness, No CVA tenderness.   Extremities: Splint to the right lower extremity intact distal pulses, No edema, No tenderness, No cyanosis, No clubbing.   Neurologic: Alert & oriented x 3, Normal motor function, Normal sensory function, No focal deficits noted.   Psychiatric: Affect normal, Judgment normal, Mood normal.     RADIOLOGY/PROCEDURES  DX-ANKLE 3+ VIEWS RIGHT   Final Result         1.  Spiral distal fibular metadiaphyseal fracture.               COURSE & MEDICAL DECISION MAKING  Pertinent Labs & Imaging studies reviewed. (See chart for details)  This a 58-year-old female who presents the emerge department with right leg pain.  The x-ray does show a spiral fracture of the fibula.  The patient has been noncompliant " and clearly walking on her splint.  Therefore she is placed in a walking boot.  She is also significantly intoxicated.  I suspect she does have gastritis from alcohol abuse.  She is able to ambulate safely and tolerate oral fluids she will be discharged with instructions to follow-up with orthopedics.    FINAL IMPRESSION  1.  Alcohol abuse and intoxication  2.  Gastritis  3.  Right fibula fracture         Electronically signed by: Chriss Loomis, 5/13/2019 11:23 PM

## 2019-05-14 NOTE — ED NOTES
The patient has been provided with discharge education and information.  The patient was also provided with instructions on follow up care and return precautions.  The patient verbalizes understanding of discharge instructions, follow up care, and return precautions.  All questions have been answered.  No RX written by ERP.  NAD, A/Ox4, good color and appropriate at time of discharge.  Patient ambulated out of department.

## 2019-05-15 ENCOUNTER — HOSPITAL ENCOUNTER (EMERGENCY)
Facility: MEDICAL CENTER | Age: 59
End: 2019-05-15
Attending: EMERGENCY MEDICINE
Payer: MEDICAID

## 2019-05-15 ENCOUNTER — HOSPITAL ENCOUNTER (EMERGENCY)
Facility: MEDICAL CENTER | Age: 59
End: 2019-05-15
Payer: MEDICAID

## 2019-05-15 ENCOUNTER — APPOINTMENT (OUTPATIENT)
Dept: RADIOLOGY | Facility: MEDICAL CENTER | Age: 59
End: 2019-05-15
Attending: EMERGENCY MEDICINE
Payer: MEDICAID

## 2019-05-15 VITALS
HEIGHT: 64 IN | WEIGHT: 177.91 LBS | SYSTOLIC BLOOD PRESSURE: 135 MMHG | RESPIRATION RATE: 14 BRPM | HEART RATE: 90 BPM | DIASTOLIC BLOOD PRESSURE: 78 MMHG | BODY MASS INDEX: 30.37 KG/M2 | OXYGEN SATURATION: 96 % | TEMPERATURE: 97.5 F

## 2019-05-15 VITALS
OXYGEN SATURATION: 98 % | DIASTOLIC BLOOD PRESSURE: 81 MMHG | SYSTOLIC BLOOD PRESSURE: 140 MMHG | HEART RATE: 95 BPM | HEIGHT: 64 IN | TEMPERATURE: 97 F | BODY MASS INDEX: 30.39 KG/M2 | RESPIRATION RATE: 16 BRPM | WEIGHT: 178 LBS
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
## 2019-05-15 DIAGNOSIS — S09.90XA CLOSED HEAD INJURY, INITIAL ENCOUNTER: ICD-10-CM

## 2019-05-15 DIAGNOSIS — F10.929 ALCOHOLIC INTOXICATION WITH COMPLICATION (HCC): ICD-10-CM

## 2019-05-15 DIAGNOSIS — F10.10 ALCOHOL ABUSE: ICD-10-CM

## 2019-05-15 LAB — POC BREATHALIZER: 0.36 PERCENT (ref 0–0.01)

## 2019-05-15 PROCEDURE — 302449 STATCHG TRIAGE ONLY (STATISTIC)

## 2019-05-15 PROCEDURE — 70450 CT HEAD/BRAIN W/O DYE: CPT

## 2019-05-15 PROCEDURE — 99284 EMERGENCY DEPT VISIT MOD MDM: CPT | Mod: 25

## 2019-05-15 PROCEDURE — 302970 POC BREATHALIZER: Performed by: EMERGENCY MEDICINE

## 2019-05-15 ASSESSMENT — LIFESTYLE VARIABLES: DO YOU DRINK ALCOHOL: YES

## 2019-05-15 NOTE — ED TRIAGE NOTES
Chief Complaint   Patient presents with   • Alcohol Intoxication     Pt BIB EMS, pt found to be intoxicated, reporting that she had a GLF and now is havig left sided head pain.    • GLF   • Head Pain   Report from EMS states PD found pt on ground able to ambulate.   Explained to pt triage process, made pt aware to tell this RN/staff of any changes/concerns, pt verbalized understanding of process and instructions given. Pt to ER lobby.

## 2019-05-16 ENCOUNTER — HOSPITAL ENCOUNTER (EMERGENCY)
Facility: MEDICAL CENTER | Age: 59
End: 2019-05-16
Attending: EMERGENCY MEDICINE
Payer: MEDICAID

## 2019-05-16 VITALS
DIASTOLIC BLOOD PRESSURE: 72 MMHG | HEART RATE: 114 BPM | SYSTOLIC BLOOD PRESSURE: 123 MMHG | TEMPERATURE: 98.2 F | HEIGHT: 64 IN | RESPIRATION RATE: 16 BRPM | BODY MASS INDEX: 30.22 KG/M2 | WEIGHT: 177 LBS

## 2019-05-16 DIAGNOSIS — R10.9 ABDOMINAL PAIN, UNSPECIFIED ABDOMINAL LOCATION: ICD-10-CM

## 2019-05-16 DIAGNOSIS — F10.20 ALCOHOLISM (HCC): ICD-10-CM

## 2019-05-16 LAB
ALBUMIN SERPL BCP-MCNC: 4.3 G/DL (ref 3.2–4.9)
ALBUMIN/GLOB SERPL: 1.3 G/DL
ALP SERPL-CCNC: 191 U/L (ref 30–99)
ALT SERPL-CCNC: 44 U/L (ref 2–50)
ANION GAP SERPL CALC-SCNC: 15 MMOL/L (ref 0–11.9)
AST SERPL-CCNC: 50 U/L (ref 12–45)
BASOPHILS # BLD AUTO: 1.8 % (ref 0–1.8)
BASOPHILS # BLD: 0.18 K/UL (ref 0–0.12)
BILIRUB SERPL-MCNC: 0.3 MG/DL (ref 0.1–1.5)
BUN SERPL-MCNC: 13 MG/DL (ref 8–22)
CALCIUM SERPL-MCNC: 9.7 MG/DL (ref 8.5–10.5)
CHLORIDE SERPL-SCNC: 99 MMOL/L (ref 96–112)
CO2 SERPL-SCNC: 22 MMOL/L (ref 20–33)
CREAT SERPL-MCNC: 0.73 MG/DL (ref 0.5–1.4)
EOSINOPHIL # BLD AUTO: 0.07 K/UL (ref 0–0.51)
EOSINOPHIL NFR BLD: 0.7 % (ref 0–6.9)
ERYTHROCYTE [DISTWIDTH] IN BLOOD BY AUTOMATED COUNT: 53.1 FL (ref 35.9–50)
ETHANOL BLD-MCNC: 0.32 G/DL
GLOBULIN SER CALC-MCNC: 3.4 G/DL (ref 1.9–3.5)
GLUCOSE SERPL-MCNC: 64 MG/DL (ref 65–99)
HCT VFR BLD AUTO: 38.6 % (ref 37–47)
HGB BLD-MCNC: 12.5 G/DL (ref 12–16)
IMM GRANULOCYTES # BLD AUTO: 0.06 K/UL (ref 0–0.11)
IMM GRANULOCYTES NFR BLD AUTO: 0.6 % (ref 0–0.9)
LIPASE SERPL-CCNC: 29 U/L (ref 11–82)
LYMPHOCYTES # BLD AUTO: 2.43 K/UL (ref 1–4.8)
LYMPHOCYTES NFR BLD: 24.9 % (ref 22–41)
MCH RBC QN AUTO: 30.9 PG (ref 27–33)
MCHC RBC AUTO-ENTMCNC: 32.4 G/DL (ref 33.6–35)
MCV RBC AUTO: 95.3 FL (ref 81.4–97.8)
MONOCYTES # BLD AUTO: 0.58 K/UL (ref 0–0.85)
MONOCYTES NFR BLD AUTO: 6 % (ref 0–13.4)
NEUTROPHILS # BLD AUTO: 6.42 K/UL (ref 2–7.15)
NEUTROPHILS NFR BLD: 66 % (ref 44–72)
NRBC # BLD AUTO: 0 K/UL
NRBC BLD-RTO: 0 /100 WBC
PLATELET # BLD AUTO: 411 K/UL (ref 164–446)
PMV BLD AUTO: 9.6 FL (ref 9–12.9)
POTASSIUM SERPL-SCNC: 3.9 MMOL/L (ref 3.6–5.5)
PROT SERPL-MCNC: 7.7 G/DL (ref 6–8.2)
RBC # BLD AUTO: 4.05 M/UL (ref 4.2–5.4)
SODIUM SERPL-SCNC: 136 MMOL/L (ref 135–145)
WBC # BLD AUTO: 9.7 K/UL (ref 4.8–10.8)

## 2019-05-16 PROCEDURE — 700111 HCHG RX REV CODE 636 W/ 250 OVERRIDE (IP): Performed by: EMERGENCY MEDICINE

## 2019-05-16 PROCEDURE — 83690 ASSAY OF LIPASE: CPT

## 2019-05-16 PROCEDURE — 80307 DRUG TEST PRSMV CHEM ANLYZR: CPT

## 2019-05-16 PROCEDURE — 85025 COMPLETE CBC W/AUTO DIFF WBC: CPT

## 2019-05-16 PROCEDURE — 99285 EMERGENCY DEPT VISIT HI MDM: CPT

## 2019-05-16 PROCEDURE — 36415 COLL VENOUS BLD VENIPUNCTURE: CPT

## 2019-05-16 PROCEDURE — 80053 COMPREHEN METABOLIC PANEL: CPT

## 2019-05-16 RX ORDER — ONDANSETRON 4 MG/1
4 TABLET, ORALLY DISINTEGRATING ORAL ONCE
Status: COMPLETED | OUTPATIENT
Start: 2019-05-16 | End: 2019-05-16

## 2019-05-16 RX ADMIN — ONDANSETRON 4 MG: 4 TABLET, ORALLY DISINTEGRATING ORAL at 07:08

## 2019-05-16 ASSESSMENT — PAIN DESCRIPTION - DESCRIPTORS: DESCRIPTORS: ACHING

## 2019-05-16 NOTE — ED NOTES
Pt told nurse that we were all idiots and that she had to go to [facility] to find out she had a fx.  Per pt she fell and hit her head.  Pt stated you better do imaging.  Pt aggressive with nurse, nurse kindly reminded that we would not tolerate cursing of any kind, pt told nurse to shut up idiot.

## 2019-05-16 NOTE — ED PROVIDER NOTES
"ED Provider Note    Scribed for Abner Booker D.O. by Kandis Booker. 5/15/2019  6:17 PM    Primary care provider: None Noted  Means of arrival: walk-in  History obtained from: patient  History limited by: patient's alcohol intoxication    CHIEF COMPLAINT  Chief Complaint   Patient presents with   • Alcohol Intoxication   • GLF   • Head Pain       HPI  Ashleigh Marquis is a 58 y.o. female who presents to the Emergency Department after sustaining a ground level fall on Friday. The patient reports that she tripped and fell, hitting the back side of her head. She states that her \"head feels swollen\" and she has sharp pains all around her body.     Patient's history limited secondary to her alcohol intoxication    REVIEW OF SYSTEMS  Pertinent positives include: ground level fall, headache, myalgias, head swelling.  Pertinent negatives include: loss of consciousness  See HPI for further details.    Patient's review of systems limited secondary to her alcohol intoxication.       ALLERGIES  Allergies   Allergen Reactions   • Sulfa Drugs Vomiting   • Toradol Vomiting   • Gabapentin      Bowel incontinence     • Amoxicillin Rash     Reported by NAIDA on arrival to ED    Pt states she takes PCN 10/3       CURRENT MEDICATIONS  Home Medications     Reviewed by Portia Nunez R.N. (Registered Nurse) on 05/15/19 at 1756  Med List Status: Unable to Obtain   Medication Last Dose Status   acetaminophen (TYLENOL) 325 MG Tab  Active   carBAMazepine (TEGRETOL) 200 MG Tab  Active   clonazePAM (KLONOPIN) 1 MG Tab  Active   doxepin (SINEQUAN) 10 MG Cap  Active   fluconazole (DIFLUCAN) 150 MG tablet  Active   fluoxetine (PROZAC) 40 MG capsule  Active   lactobacillus rhamnosus (CULTURELLE) Cap capsule  Active   lisinopril (PRINIVIL) 10 MG Tab  Active   magnesium oxide (MAG-OX) 400 (241.3 Mg) MG Tab tablet  Active   nystatin (MYCOSTATIN) 208365 UNIT/GM Cream topical cream  Active   omeprazole (PRILOSEC) 20 MG delayed-release capsule  " "Active   ondansetron (ZOFRAN ODT) 4 MG TABLET DISPERSIBLE  Active   pancrelipase, Lip-Prot-Amyl, (CREON 3000) 2566-2840 units Cap DR Particles  Active   polyethylene glycol/lytes (MIRALAX) Pack  Active   quetiapine (SEROQUEL) 50 MG tablet  Active   senna-docusate (PERICOLACE OR SENOKOT S) 8.6-50 MG Tab  Active                PAST MEDICAL HISTORY   has a past medical history of Alcoholism ([facility]); Anxiety; Arthritis; ASTHMA; Cancer (HCC) (1981); Chronic low back pain; Congestive heart failure ([facility]); Depression; EtOH dependence ([facility]); Fall; GERD (gastroesophageal reflux disease); HTN; Hypertension; Indigestion; Muscle disorder; OSTEOPOROSIS; Other specified symptom associated with female genital organs; Psychiatric disorder; PTSD (post-traumatic stress disorder); Renal disorder; Seizure ([facility]); Ulcer; and Vitamin D deficiency.    SURGICAL HISTORY   has a past surgical history that includes hernia repair (1977); cysto stent placemnt pre surg (10/7/2010); cystoscopy stent placement (11/9/2010); ureteroscopy (11/9/2010); lasertripsy (11/9/2010); stent removal (11/9/2010); and gastroscopy-endo (N/A, 10/3/2018).    SOCIAL HISTORY  Social History   Substance Use Topics   • Smoking status: Current Every Day Smoker     Packs/day: 0.50     Years: 20.00     Types: Cigarettes   • Smokeless tobacco: Never Used      Comment: 1/2 pack per day   • Alcohol use Yes      Comment: vodka, heavy use      History   Drug Use No       FAMILY HISTORY  Family History   Problem Relation Age of Onset   • Heart Disease Father    • Hypertension Father    • Diabetes Father    • Cancer Paternal Grandmother    • Depression Other    • Lung Disease Neg Hx    • Stroke Neg Hx          PHYSICAL EXAM  VITAL SIGNS: /89   Pulse (!) 115   Temp 36.4 °C (97.5 °F) (Temporal)   Resp 17   Ht 1.626 m (5' 4\")   Wt 80.7 kg (177 lb 14.6 oz)   LMP 11/02/2015   SpO2 95%   BMI 30.54 kg/m²   Constitutional: Clinically intoxicated, Well-nourished, Well " developed. In mild distress.   Head: tenderness over forehead and right parietal region without deformity, Normocephalic  Eyes: PERRL, sclera anicteric, EOMI, no lid swelling  ENT: No nasal discharge or epistaxis. No facial deformity. Mucous membranes are dry.  Cardiovascular: Regular rate and rhythm without S3,S4, or murmur.   Lungs: No respiratory distress. No cough. Lungs are clear bilaterally. No rales, rhonchi, or wheezing.   Chest Wall: No tenderness or deformity.   Abdomen: Soft, non-tender, non-distended. Without organomegaly. No rebound, rigidity, or guarding. No masses or abnormal pulsations.   Skin: no abrasion but some sunbrn over face, Without significant rash or lesions.    Extremities: Right leg in walking boot from previous injury, No deformity. Non-tender. No swelling.  Neurologic: Sensorimotor grossly intact, Awake and alert. No sensory deficits. No motor deficits.     DIAGNOSTIC STUDIES / PROCEDURES    Results for orders placed or performed during the hospital encounter of 05/15/19   POC BREATHALIZER   Result Value Ref Range    POC Breathalizer 0.36 (A) 0.00 - 0.01 Percent       All labs reviewed by me.  Dr. Booker reviewed and agreed with the EKG.     RADIOLOGY  CT-HEAD W/O   Final Result         1.  No acute intracranial abnormality is identified, there are nonspecific white matter changes, commonly associated with small vessel ischemic disease.  Associated mild cerebral atrophy is noted.        Films read by Radiologist, and independently reviewed by myself.    COURSE & MEDICAL DECISION MAKING:  Nursing notes, VS, PMSFHx reviewed in chart.     6:17 PM - Patient seen and examined at bedside. Ordered CT-head and POC breathalizer to evaluate her symptoms. We will continue to observe her until she is able to walk with steady gait.     8:25 PM - Patient is sleeping in [place].    11:43 PM - Patient ambulated with steady gait after waking up. She was given her discharge instructions and understands and  agrees. She will return if her symptoms return or worsen.     FINAL IMPRESSION:  1. Closed head injury, initial encounter    2. Fall, initial encounter    3. Alcohol abuse    4. Alcoholic intoxication with complication (HCC)        DISPOSITION:  Patient will be discharged home in stable condition.    FOLLOW UP:  [facility], Emergency Dept  [address] [id number] [phone]    If symptoms worsen         IKandis (Scribe), am scribing for, and in the presence of, Abner Booker D.O..    Electronically signed by: Kandis Booker (Scribe), 5/15/2019    I, Abner Booker D.O. personally performed the services described in this documentation, as scribed by Kandis Booker in my presence, and it is both accurate and complete.    C    Please note that this dictation was created using voice recognition software. I have worked with consultants from the vendor as well as technical experts from [facility] to optimize the interface. I have made every reasonable attempt to correct obvious errors, but I expect that there are errors of grammar and possibly content that I did not discover before finalizing the note.

## 2019-05-16 NOTE — ED TRIAGE NOTES
Abdominal Pain (Per EMS pt was caught trespassing and when PD arrived pt developed abd pain. Pt c/o generalized abd pain. )

## 2019-05-16 NOTE — ED PROVIDER NOTES
"ED Provider Note    CHIEF COMPLAINT  Chief Complaint   Patient presents with   • Abdominal Pain     Per EMS pt was caught trespassing and when PD arrived pt developed abd pain. Pt c/o generalized abd pain.        HPI  Ashleigh Marqius is a 58 y.o. female who presents presents to the ER after complaining of abdominal pain to the place.  She was caught trespassing.  The police were called.  Is going to be taken to [facility], but because of her complaints of abdominal pain she was brought here.  She is well-known to the emergency department and is frequently here with alcohol intoxication.  She was in the emergency department yesterday afternoon after head injury related to her alcohol abuse.  CT scan of the head was obtained and was negative.  She tells me her abdomen hurts all over.  She has slow deliberate slurred speech typical for her presentations while intoxicated.  She denies vomiting or diarrhea.  No bloody stool or bloody vomit.  No dysuria or hematuria.  No flank pain.    Many attempts have been made to help the patient detox.  She  has not recently followed through with any meaningful time of sobriety .    REVIEW OF SYSTEMS  As per HPI, otherwise a 10 point review of systems is negative    PAST MEDICAL HISTORY  Past Medical History:   Diagnosis Date   • Alcoholism (HCC)    • Anxiety    • Arthritis    • ASTHMA    • Cancer (HCC) 1981    cervical   • Chronic low back pain    • Congestive heart failure (HCC)    • Depression    • EtOH dependence (HCC)    • Fall     alcohol related   • GERD (gastroesophageal reflux disease)    • HTN    • Hypertension    • Indigestion     GERD   • Muscle disorder    • OSTEOPOROSIS    • Other specified symptom associated with female genital organs     \"I have fibroids bad\"\"   • Psychiatric disorder    • PTSD (post-traumatic stress disorder)    • Renal disorder     Hx of stones   • Seizure (HCC)     several years ago r/t alcohol withdrawl   • Ulcer    • Vitamin D deficiency  "       SOCIAL HISTORY  Social History   Substance Use Topics   • Smoking status: Current Every Day Smoker     Packs/day: 0.50     Years: 20.00     Types: Cigarettes   • Smokeless tobacco: Never Used      Comment: 1/2 pack per day   • Alcohol use Yes      Comment: vodka, heavy use       SURGICAL HISTORY  Past Surgical History:   Procedure Laterality Date   • GASTROSCOPY-ENDO N/A 10/3/2018    Procedure: GASTROSCOPY-ENDO;  Surgeon: Ben Nergo M.D.;  Location: ENDOSCOPY [facility];  Service: Gastroenterology   • CYSTOSCOPY STENT PLACEMENT  11/9/2010    Performed by ANGEL HARRIS at SURGERY [facility]   • URETEROSCOPY  11/9/2010    Performed by ANGEL HARRIS at SURGERY [facility]   • LASERTRIPSY  11/9/2010    Performed by ANGEL HARRIS at [facility]   • STENT REMOVAL  11/9/2010    Performed by ANGEL HARRIS at [facility]   • CYSTO STENT PLACEMNT PRE SURG  10/7/2010    Performed by ANGEL HARRIS at SURGERY [facility]   • HERNIA REPAIR  1977       CURRENT MEDICATIONS  Home Medications     Reviewed by Jose Bone R.N. (Registered Nurse) on 05/16/19 at 0647  Med List Status: <None>   Medication Last Dose Status   acetaminophen (TYLENOL) 325 MG Tab  Active   carBAMazepine (TEGRETOL) 200 MG Tab  Active   clonazePAM (KLONOPIN) 1 MG Tab  Active   doxepin (SINEQUAN) 10 MG Cap  Active   fluconazole (DIFLUCAN) 150 MG tablet  Active   fluoxetine (PROZAC) 40 MG capsule  Active   lactobacillus rhamnosus (CULTURELLE) Cap capsule  Active   lisinopril (PRINIVIL) 10 MG Tab  Active   magnesium oxide (MAG-OX) 400 (241.3 Mg) MG Tab tablet  Active   nystatin (MYCOSTATIN) 019542 UNIT/GM Cream topical cream  Active   omeprazole (PRILOSEC) 20 MG delayed-release capsule  Active   ondansetron (ZOFRAN ODT) 4 MG TABLET DISPERSIBLE  Active   pancrelipase, Lip-Prot-Amyl, (CREON 3000) 4235-6008 units Cap DR Particles  Active   polyethylene glycol/lytes (MIRALAX) Pack  Active   quetiapine  "(SEROQUEL) 50 MG tablet  Active   senna-docusate (PERICOLACE OR SENOKOT S) 8.6-50 MG Tab  Active                ALLERGIES  Allergies   Allergen Reactions   • Sulfa Drugs Vomiting   • Toradol Vomiting   • Gabapentin      Bowel incontinence     • Amoxicillin Rash     Reported by JAYLA on arrival to ED    Pt states she takes PCN 10/3       PHYSICAL EXAM  VITAL SIGNS: /86   Pulse 89   Temp 36.8 °C (98.2 °F) (Tympanic)   Resp 14   Ht 1.626 m (5' 4\")   Wt 80.3 kg (177 lb)   LMP 11/02/2015   BMI 30.38 kg/m²    Constitutional: Awake and alert.  Disheveled  HENT:  Atraumatic, Normocephalic.Oropharynx moist mucus membranes, Nose normal inspection.   Eyes: Normal inspection  Neck: Supple  Cardiovascular: Normal heart rate, Normal rhythm.  Symmetric peripheral pulses.   Thorax & Lungs: No respiratory distress, No wheezing, No rales, No rhonchi, No chest tenderness.   Abdomen: Bowel sounds normal, soft, non-distended, nontender, no mass  Skin: Few minor bruises over the abdomen.  No distention, tympany, peritonitis  Back: No tenderness, No CVA tenderness.   Extremities: No clubbing, cyanosis, edema, no Homans or cords   Neurologic: Slow deliberate speech.  Was all extremities symmetrically.    Labs:  Results for orders placed or performed during the hospital encounter of 05/16/19   CBC WITH DIFFERENTIAL   Result Value Ref Range    WBC 9.7 4.8 - 10.8 K/uL    RBC 4.05 (L) 4.20 - 5.40 M/uL    Hemoglobin 12.5 12.0 - 16.0 g/dL    Hematocrit 38.6 37.0 - 47.0 %    MCV 95.3 81.4 - 97.8 fL    MCH 30.9 27.0 - 33.0 pg    MCHC 32.4 (L) 33.6 - 35.0 g/dL    RDW 53.1 (H) 35.9 - 50.0 fL    Platelet Count 411 164 - 446 K/uL    MPV 9.6 9.0 - 12.9 fL    Neutrophils-Polys 66.00 44.00 - 72.00 %    Lymphocytes 24.90 22.00 - 41.00 %    Monocytes 6.00 0.00 - 13.40 %    Eosinophils 0.70 0.00 - 6.90 %    Basophils 1.80 0.00 - 1.80 %    Immature Granulocytes 0.60 0.00 - 0.90 %    Nucleated RBC 0.00 /100 WBC    Neutrophils (Absolute) 6.42 2.00 " - 7.15 K/uL    Lymphs (Absolute) 2.43 1.00 - 4.80 K/uL    Monos (Absolute) 0.58 0.00 - 0.85 K/uL    Eos (Absolute) 0.07 0.00 - 0.51 K/uL    Baso (Absolute) 0.18 (H) 0.00 - 0.12 K/uL    Immature Granulocytes (abs) 0.06 0.00 - 0.11 K/uL    NRBC (Absolute) 0.00 K/uL   COMP METABOLIC PANEL   Result Value Ref Range    Sodium 136 135 - 145 mmol/L    Potassium 3.9 3.6 - 5.5 mmol/L    Chloride 99 96 - 112 mmol/L    Co2 22 20 - 33 mmol/L    Anion Gap 15.0 (H) 0.0 - 11.9    Glucose 64 (L) 65 - 99 mg/dL    Bun 13 8 - 22 mg/dL    Creatinine 0.73 0.50 - 1.40 mg/dL    Calcium 9.7 8.5 - 10.5 mg/dL    AST(SGOT) 50 (H) 12 - 45 U/L    ALT(SGPT) 44 2 - 50 U/L    Alkaline Phosphatase 191 (H) 30 - 99 U/L    Total Bilirubin 0.3 0.1 - 1.5 mg/dL    Albumin 4.3 3.2 - 4.9 g/dL    Total Protein 7.7 6.0 - 8.2 g/dL    Globulin 3.4 1.9 - 3.5 g/dL    A-G Ratio 1.3 g/dL   LIPASE   Result Value Ref Range    Lipase 29 11 - 82 U/L   DIAGNOSTIC ALCOHOL   Result Value Ref Range    Diagnostic Alcohol 0.32 (H) 0.00 g/dL   ESTIMATED GFR   Result Value Ref Range    GFR If African American >60 >60 mL/min/1.73 m 2    GFR If Non African American >60 >60 mL/min/1.73 m 2       Medications   ondansetron (ZOFRAN ODT) dispertab 4 mg (4 mg Oral Given 5/16/19 0708)         COURSE & MEDICAL DECISION MAKING  Patient presents to the ER with complaints of abdominal pain.  She stated she was nauseated.  She was given Zofran orally.  Laboratory data was collected.  This returned without any alarming findings aside from high level of intoxication.  Patient was observed in the ER.  Her blood sugar was marginal and she was given a meal.  She was able to eat without difficulty.  No vomiting here.  Vitals remained stable.  I suspect most likely her abdominal pain is related to her alcohol abuse and alcohol-related gastritis.  She does not have plans to stop drinking.  Referred again to the [facility] clinic for primary medical care.  She will ER for concerning medical  symptoms.      FINAL IMPRESSION  1.  Abdominal pain  2.  Alcohol abuse and addiction      This dictation was created using voice recognition software. The accuracy of the dictation is limited to the abilities of the software.  The nursing notes were reviewed and certain aspects of this information were incorporated into this note.      Electronically signed by: Romero Light, 5/16/2019 9:02 AM

## 2019-05-16 NOTE — ED TRIAGE NOTES
Chief Complaint   Patient presents with   • Alcohol Intoxication   • GLF   • Head Pain        Patient ambulatory to triage with walking boot on. Patient was brought in early by ems but left before seeing a doctor. Patient states she had a GLF and is now having left sided head pain. Patient is A+Ox4 and ambulatory at this time.

## 2019-05-16 NOTE — ED NOTES
Pt to be d/c once more alert and has steady and even gait. Moved to [place] [id number]. Report to Joseph IRENE. ERP aware of patient moving rooms.

## 2019-05-16 NOTE — ED NOTES
Pt discharged home.  Verbalized understanding of discharge instructions, gait steady, speech clear, skin PWD, A and O x 4, in NAD.

## 2019-05-16 NOTE — ED NOTES
Pt ambulated with steady gait and without assistance. When informed of discharge at this time the pt did not have any complaints or concerns.

## 2019-05-17 NOTE — ED PROVIDER NOTES
ED PROVIDER NOTE    Scribed for No att. providers found by Robert Calderon. 5/16/2019, 6:16 PM.    This is an addendum to the note on Ashleigh Marquis. For further details and full chart entry, see the previously signed ED Provider Note written by Dr. Light ([facility]).   Nursing staff notified me that the patient had a bowel movement with blood in it and requested that I evaluate the patient.  Upon my exam she is awake and alert has no complaints of abdominal pain.  I did perform a rectal exam which revealed a nonthrombosed external hemorrhoid.  There was no blood at this time nor was her blood in the stool.  She will be discharged in stable and improved condition.      FINAL IMPRESSION   Chronic alcohol abuse  Historical rectal bleeding-ruled out  External hemorrhoid   I, Robert Calderon (Scribe), am scribing for, and in the presence of, No att. providers found.    Electronically signed by: Robert Calderon ([facility]), 5/16/2019    I, No att. providers found personally performed the services described in this documentation, as scribed by Robert Calderon in my presence, and it is both accurate and complete.    The note accurately reflects work and decisions made by me.  Marcela Landers  5/16/2019  8:51 PM

## 2019-05-22 LAB
FUNGUS SPEC CULT: NORMAL
SIGNIFICANT IND 70042: NORMAL
SITE SITE: NORMAL
SOURCE SOURCE: NORMAL

## 2019-06-17 LAB
MYCOBACTERIUM SPEC CULT: NORMAL
RHODAMINE-AURAMINE STN SPEC: NORMAL
SIGNIFICANT IND 70042: NORMAL
SITE SITE: NORMAL
SOURCE SOURCE: NORMAL

## 2019-07-07 ENCOUNTER — APPOINTMENT (OUTPATIENT)
Dept: RADIOLOGY | Facility: MEDICAL CENTER | Age: 59
End: 2019-07-07
Attending: EMERGENCY MEDICINE
Payer: MEDICAID

## 2019-07-07 ENCOUNTER — HOSPITAL ENCOUNTER (EMERGENCY)
Facility: MEDICAL CENTER | Age: 59
End: 2019-07-07
Attending: EMERGENCY MEDICINE
Payer: MEDICAID

## 2019-07-07 ENCOUNTER — HOSPITAL ENCOUNTER (EMERGENCY)
Facility: MEDICAL CENTER | Age: 59
End: 2019-07-08
Attending: EMERGENCY MEDICINE
Payer: MEDICAID

## 2019-07-07 VITALS
SYSTOLIC BLOOD PRESSURE: 129 MMHG | DIASTOLIC BLOOD PRESSURE: 81 MMHG | HEART RATE: 100 BPM | WEIGHT: 160 LBS | RESPIRATION RATE: 15 BRPM | BODY MASS INDEX: 25.71 KG/M2 | HEIGHT: 66 IN | TEMPERATURE: 97.8 F

## 2019-07-07 DIAGNOSIS — F10.929 ALCOHOLIC INTOXICATION WITH COMPLICATION (HCC): ICD-10-CM

## 2019-07-07 DIAGNOSIS — E86.0 DEHYDRATION: ICD-10-CM

## 2019-07-07 DIAGNOSIS — E87.6 HYPOKALEMIA: ICD-10-CM

## 2019-07-07 LAB
ALBUMIN SERPL BCP-MCNC: 4.2 G/DL (ref 3.2–4.9)
ALBUMIN/GLOB SERPL: 1.2 G/DL
ALP SERPL-CCNC: 172 U/L (ref 30–99)
ALT SERPL-CCNC: 45 U/L (ref 2–50)
ANION GAP SERPL CALC-SCNC: 15 MMOL/L (ref 0–11.9)
ANION GAP SERPL CALC-SCNC: 22 MMOL/L (ref 0–11.9)
AST SERPL-CCNC: 70 U/L (ref 12–45)
BASOPHILS # BLD AUTO: 0.9 % (ref 0–1.8)
BASOPHILS # BLD: 0.07 K/UL (ref 0–0.12)
BILIRUB SERPL-MCNC: 0.5 MG/DL (ref 0.1–1.5)
BUN SERPL-MCNC: 13 MG/DL (ref 8–22)
BUN SERPL-MCNC: 14 MG/DL (ref 8–22)
CALCIUM SERPL-MCNC: 10.3 MG/DL (ref 8.5–10.5)
CALCIUM SERPL-MCNC: 9.4 MG/DL (ref 8.5–10.5)
CHLORIDE SERPL-SCNC: 101 MMOL/L (ref 96–112)
CHLORIDE SERPL-SCNC: 95 MMOL/L (ref 96–112)
CO2 SERPL-SCNC: 20 MMOL/L (ref 20–33)
CO2 SERPL-SCNC: 24 MMOL/L (ref 20–33)
CREAT SERPL-MCNC: 0.58 MG/DL (ref 0.5–1.4)
CREAT SERPL-MCNC: 0.69 MG/DL (ref 0.5–1.4)
EOSINOPHIL # BLD AUTO: 0.01 K/UL (ref 0–0.51)
EOSINOPHIL NFR BLD: 0.1 % (ref 0–6.9)
ERYTHROCYTE [DISTWIDTH] IN BLOOD BY AUTOMATED COUNT: 49.2 FL (ref 35.9–50)
GLOBULIN SER CALC-MCNC: 3.4 G/DL (ref 1.9–3.5)
GLUCOSE SERPL-MCNC: 53 MG/DL (ref 65–99)
GLUCOSE SERPL-MCNC: 87 MG/DL (ref 65–99)
HCT VFR BLD AUTO: 38.8 % (ref 37–47)
HGB BLD-MCNC: 13 G/DL (ref 12–16)
IMM GRANULOCYTES # BLD AUTO: 0.03 K/UL (ref 0–0.11)
IMM GRANULOCYTES NFR BLD AUTO: 0.4 % (ref 0–0.9)
LIPASE SERPL-CCNC: 50 U/L (ref 11–82)
LYMPHOCYTES # BLD AUTO: 1.5 K/UL (ref 1–4.8)
LYMPHOCYTES NFR BLD: 18.7 % (ref 22–41)
MCH RBC QN AUTO: 30.1 PG (ref 27–33)
MCHC RBC AUTO-ENTMCNC: 33.5 G/DL (ref 33.6–35)
MCV RBC AUTO: 89.8 FL (ref 81.4–97.8)
MONOCYTES # BLD AUTO: 0.6 K/UL (ref 0–0.85)
MONOCYTES NFR BLD AUTO: 7.5 % (ref 0–13.4)
NEUTROPHILS # BLD AUTO: 5.81 K/UL (ref 2–7.15)
NEUTROPHILS NFR BLD: 72.4 % (ref 44–72)
NRBC # BLD AUTO: 0 K/UL
NRBC BLD-RTO: 0 /100 WBC
PLATELET # BLD AUTO: 377 K/UL (ref 164–446)
PMV BLD AUTO: 9.2 FL (ref 9–12.9)
POC BREATHALIZER: 0.04 PERCENT (ref 0–0.01)
POC BREATHALIZER: 0.32 PERCENT (ref 0–0.01)
POTASSIUM SERPL-SCNC: 3.1 MMOL/L (ref 3.6–5.5)
POTASSIUM SERPL-SCNC: 3.2 MMOL/L (ref 3.6–5.5)
PROT SERPL-MCNC: 7.6 G/DL (ref 6–8.2)
RBC # BLD AUTO: 4.32 M/UL (ref 4.2–5.4)
SODIUM SERPL-SCNC: 137 MMOL/L (ref 135–145)
SODIUM SERPL-SCNC: 140 MMOL/L (ref 135–145)
WBC # BLD AUTO: 8 K/UL (ref 4.8–10.8)

## 2019-07-07 PROCEDURE — 700102 HCHG RX REV CODE 250 W/ 637 OVERRIDE(OP): Performed by: EMERGENCY MEDICINE

## 2019-07-07 PROCEDURE — 99284 EMERGENCY DEPT VISIT MOD MDM: CPT

## 2019-07-07 PROCEDURE — 99285 EMERGENCY DEPT VISIT HI MDM: CPT

## 2019-07-07 PROCEDURE — 36415 COLL VENOUS BLD VENIPUNCTURE: CPT

## 2019-07-07 PROCEDURE — 304561 HCHG STAT O2

## 2019-07-07 PROCEDURE — 302970 POC BREATHALIZER: Performed by: EMERGENCY MEDICINE

## 2019-07-07 PROCEDURE — 80053 COMPREHEN METABOLIC PANEL: CPT

## 2019-07-07 PROCEDURE — 700105 HCHG RX REV CODE 258: Performed by: EMERGENCY MEDICINE

## 2019-07-07 PROCEDURE — 80048 BASIC METABOLIC PNL TOTAL CA: CPT

## 2019-07-07 PROCEDURE — 96372 THER/PROPH/DIAG INJ SC/IM: CPT

## 2019-07-07 PROCEDURE — 83690 ASSAY OF LIPASE: CPT

## 2019-07-07 PROCEDURE — 73130 X-RAY EXAM OF HAND: CPT | Mod: RT

## 2019-07-07 PROCEDURE — A9270 NON-COVERED ITEM OR SERVICE: HCPCS | Performed by: EMERGENCY MEDICINE

## 2019-07-07 PROCEDURE — 85025 COMPLETE CBC W/AUTO DIFF WBC: CPT

## 2019-07-07 PROCEDURE — 700111 HCHG RX REV CODE 636 W/ 250 OVERRIDE (IP)

## 2019-07-07 PROCEDURE — 302970 POC BREATHALIZER

## 2019-07-07 RX ORDER — LORAZEPAM 2 MG/ML
INJECTION INTRAMUSCULAR
Status: COMPLETED
Start: 2019-07-07 | End: 2019-07-07

## 2019-07-07 RX ORDER — HALOPERIDOL 5 MG/ML
INJECTION INTRAMUSCULAR
Status: COMPLETED
Start: 2019-07-07 | End: 2019-07-07

## 2019-07-07 RX ORDER — HALOPERIDOL 5 MG/ML
5 INJECTION INTRAMUSCULAR ONCE
Status: COMPLETED | OUTPATIENT
Start: 2019-07-07 | End: 2019-07-07

## 2019-07-07 RX ORDER — DIAZEPAM 5 MG/1
5 TABLET ORAL ONCE
Status: COMPLETED | OUTPATIENT
Start: 2019-07-07 | End: 2019-07-07

## 2019-07-07 RX ORDER — LORAZEPAM 2 MG/ML
1 INJECTION INTRAMUSCULAR ONCE
Status: COMPLETED | OUTPATIENT
Start: 2019-07-07 | End: 2019-07-07

## 2019-07-07 RX ORDER — SODIUM CHLORIDE 9 MG/ML
1000 INJECTION, SOLUTION INTRAVENOUS ONCE
Status: COMPLETED | OUTPATIENT
Start: 2019-07-07 | End: 2019-07-07

## 2019-07-07 RX ADMIN — SODIUM CHLORIDE 1000 ML: 9 INJECTION, SOLUTION INTRAVENOUS at 20:44

## 2019-07-07 RX ADMIN — HALOPERIDOL 5 MG: 5 INJECTION INTRAMUSCULAR at 15:15

## 2019-07-07 RX ADMIN — DIAZEPAM 5 MG: 5 TABLET ORAL at 23:48

## 2019-07-07 RX ADMIN — LORAZEPAM 1 MG: 2 INJECTION INTRAMUSCULAR; INTRAVENOUS at 15:15

## 2019-07-07 RX ADMIN — LORAZEPAM 1 MG: 2 INJECTION INTRAMUSCULAR at 15:15

## 2019-07-07 RX ADMIN — HALOPERIDOL LACTATE 5 MG: 5 INJECTION, SOLUTION INTRAMUSCULAR at 15:15

## 2019-07-07 NOTE — ED PROVIDER NOTES
ED Provider Note    CHIEF COMPLAINT  Chief Complaint   Patient presents with   • Alcohol Intoxication       HPI  Ashleigh Marquis is a 58 y.o. female here for evaluation of alcohol abuse.  The patient was just seen and evaluated here a couple of hours ago, and sent home after she wanted to leave.  She had a steady gait, and left the hospital.  Patient returns now because she states that she is still intoxicated, and no complains of some left upper abdominal pain.  She denies any fever or chills, she has no chest pain, no shortness of breath.  She denies any back pain.  She states that she has some left upper abdominal pain, that does not radiate.  She denies any history of the same.  And she has no other history to provide.    PAST MEDICAL HISTORY   has a past medical history of Alcoholism ([facility]); Anxiety; Arthritis; ASTHMA; Cancer ([facility]) (1981); Chronic low back pain; Congestive heart failure ([facility]); Depression; EtOH dependence ([facility]); Fall; GERD (gastroesophageal reflux disease); HTN; Hypertension; Indigestion; Muscle disorder; OSTEOPOROSIS; Other specified symptom associated with female genital organs; Psychiatric disorder; PTSD (post-traumatic stress disorder); Renal disorder; Seizure ([facility]); Ulcer; and Vitamin D deficiency.    SOCIAL HISTORY  Social History     Social History Main Topics   • Smoking status: Current Every Day Smoker     Packs/day: 0.50     Years: 20.00     Types: Cigarettes   • Smokeless tobacco: Never Used      Comment: 1/2 pack per day   • Alcohol use Yes      Comment: vodka, heavy use   • Drug use: No   • Sexual activity: No       SURGICAL HISTORY   has a past surgical history that includes hernia repair (1977); cysto stent placemnt pre surg (10/7/2010); cystoscopy stent placement (11/9/2010); ureteroscopy (11/9/2010); lasertripsy (11/9/2010); stent removal (11/9/2010); and gastroscopy-endo (N/A, 10/3/2018).    CURRENT MEDICATIONS  Home Medications     Reviewed by Radha Coronel R.N.  "(Registered Nurse) on 07/07/19 at 1357  Med List Status: Not Addressed   Medication Last Dose Status   acetaminophen (TYLENOL) 325 MG Tab  Active   carBAMazepine (TEGRETOL) 200 MG Tab  Active   doxepin (SINEQUAN) 10 MG Cap  Active   fluoxetine (PROZAC) 40 MG capsule  Active   lactobacillus rhamnosus (CULTURELLE) Cap capsule  Active   lisinopril (PRINIVIL) 10 MG Tab  Active   magnesium oxide (MAG-OX) 400 (241.3 Mg) MG Tab tablet  Active   nystatin (MYCOSTATIN) 270015 UNIT/GM Cream topical cream  Active   omeprazole (PRILOSEC) 20 MG delayed-release capsule  Active   ondansetron (ZOFRAN ODT) 4 MG TABLET DISPERSIBLE  Active   pancrelipase, Lip-Prot-Amyl, (CREON 3000) 8352-2400 units Cap DR Particles  Active   polyethylene glycol/lytes (MIRALAX) Pack  Active   senna-docusate (PERICOLACE OR SENOKOT S) 8.6-50 MG Tab  Active                ALLERGIES  Allergies   Allergen Reactions   • Sulfa Drugs Vomiting   • Toradol Vomiting   • Gabapentin      Bowel incontinence     • Amoxicillin Rash     Reported by NAIDA on arrival to ED    Pt states she takes PCN 10/3       REVIEW OF SYSTEMS  See HPI for further details. Review of systems as above, otherwise all other systems are negative.     PHYSICAL EXAM  VITAL SIGNS: /90   Pulse (!) 114   Temp 37.2 °C (99 °F)   Resp 16   Ht 1.651 m (5' 5\")   Wt 72.6 kg (160 lb)   LMP 11/02/2015   BMI 26.63 kg/m²     Constitutional: Well developed, well nourished. No acute distress.  HEENT: Normocephalic, atraumatic. MMM  Neck: Supple, Full range of motion   Chest/Pulmonary:  No respiratory distress.  Equal expansion   Abdomen; soft, mild tenderness to the left upper quadrant, no epigastric tenderness, no lower abdominal tenderness.  Musculoskeletal: No deformity, no edema, neurovascular intact.   Neuro: Slurred speech, appropriate, cooperative, cranial nerves II-XII grossly intact.  Psych: Anxious, agitated    Results for orders placed or performed during the hospital encounter of " 07/07/19   CBC WITH DIFFERENTIAL   Result Value Ref Range    WBC 8.0 4.8 - 10.8 K/uL    RBC 4.32 4.20 - 5.40 M/uL    Hemoglobin 13.0 12.0 - 16.0 g/dL    Hematocrit 38.8 37.0 - 47.0 %    MCV 89.8 81.4 - 97.8 fL    MCH 30.1 27.0 - 33.0 pg    MCHC 33.5 (L) 33.6 - 35.0 g/dL    RDW 49.2 35.9 - 50.0 fL    Platelet Count 377 164 - 446 K/uL    MPV 9.2 9.0 - 12.9 fL    Neutrophils-Polys 72.40 (H) 44.00 - 72.00 %    Lymphocytes 18.70 (L) 22.00 - 41.00 %    Monocytes 7.50 0.00 - 13.40 %    Eosinophils 0.10 0.00 - 6.90 %    Basophils 0.90 0.00 - 1.80 %    Immature Granulocytes 0.40 0.00 - 0.90 %    Nucleated RBC 0.00 /100 WBC    Neutrophils (Absolute) 5.81 2.00 - 7.15 K/uL    Lymphs (Absolute) 1.50 1.00 - 4.80 K/uL    Monos (Absolute) 0.60 0.00 - 0.85 K/uL    Eos (Absolute) 0.01 0.00 - 0.51 K/uL    Baso (Absolute) 0.07 0.00 - 0.12 K/uL    Immature Granulocytes (abs) 0.03 0.00 - 0.11 K/uL    NRBC (Absolute) 0.00 K/uL   COMP METABOLIC PANEL   Result Value Ref Range    Sodium 137 135 - 145 mmol/L    Potassium 3.2 (L) 3.6 - 5.5 mmol/L    Chloride 95 (L) 96 - 112 mmol/L    Co2 20 20 - 33 mmol/L    Anion Gap 22.0 (H) 0.0 - 11.9    Glucose 87 65 - 99 mg/dL    Bun 14 8 - 22 mg/dL    Creatinine 0.69 0.50 - 1.40 mg/dL    Calcium 10.3 8.5 - 10.5 mg/dL    AST(SGOT) 70 (H) 12 - 45 U/L    ALT(SGPT) 45 2 - 50 U/L    Alkaline Phosphatase 172 (H) 30 - 99 U/L    Total Bilirubin 0.5 0.1 - 1.5 mg/dL    Albumin 4.2 3.2 - 4.9 g/dL    Total Protein 7.6 6.0 - 8.2 g/dL    Globulin 3.4 1.9 - 3.5 g/dL    A-G Ratio 1.2 g/dL   LIPASE   Result Value Ref Range    Lipase 50 11 - 82 U/L   ESTIMATED GFR   Result Value Ref Range    GFR If African American >60 >60 mL/min/1.73 m 2    GFR If Non African American >60 >60 mL/min/1.73 m 2   POC BREATHALIZER   Result Value Ref Range    POC Breathalizer 0.317 (A) 0.00 - 0.01 Percent       PROCEDURES     MEDICAL RECORD  I have reviewed patient's medical record and pertinent results are listed above.    COURSE & MEDICAL  DECISION MAKING  I have reviewed any medical record information, laboratory studies and radiographic results as noted above.    6:29 PM  The pt is out of restraints, calm., and is not having an issues.  She will be allowed to sleep, and then when able to ambulate, she will be discharged.  The pt was seen earlier today, by me, then left, went out and drank some additional alcohol, and returned here.   Pt has no reports of abdominal pain at this time.     6:46 PM  Dr. Villafana will assume care of the pt. He will order iv fluids.     I you have had any blood pressure issues while here in the emergency department, please see your doctor for a further evaluation or work up.    Differential diagnoses include but not limited to: etoh abuse.     This patient presents with alcohol abuse.  At this time, I have counseled the patient/family regarding their medications, pain control, and follow up.  They will continue their medications, if any, as prescribed.  They will return immediately for any worsening symptoms and/or any other medical concerns.  They will see their doctor, or contact the doctor provided, in 1-2 days for follow up.       FINAL IMPRESSION  etoh abuse       Electronically signed by: Jake Mayo, 7/7/2019 2:22 PM

## 2019-07-07 NOTE — ED NOTES
Pt still agitated and banging her head against the wall. Pt then began trying to hit her head on the metal under the bed. Pt placed in 4pt restraints

## 2019-07-07 NOTE — ED PROVIDER NOTES
"ED Provider Note    CHIEF COMPLAINT  Chief Complaint   Patient presents with   • ETOH Withdrawal     Pt found intoxicated sleeping in the middle of the road.       HPI  Ashleigh Marquis is a 58 y.o. female here for evaluation of right hand pain.  The patient states that last night she was trying to get up while she was resting on the ground, and she felt like she \"broke my hand.\"  Patient only complains of palm and thumb pain, she has no wrist pain.  She is right-handed.  She has no other medical complaints.  She did not strike her head or fall, she has no headache, neck pain, chest pain, or shortness of breath.  She has no abdominal pain.  The patient admits to drinking alcohol last evening and early this morning, but again has no other medical complaints.  She states that her pain in her right hand is exacerbated by moving it, and alleviated with remaining still.  She describes as an aching pain, that is nonradiating she has not taken anything for the pain prior to arrival.    PAST MEDICAL HISTORY   has a past medical history of Alcoholism ([facility]); Anxiety; Arthritis; ASTHMA; Cancer ([facility]) (1981); Chronic low back pain; Congestive heart failure ([facility]); Depression; EtOH dependence ([facility]); Fall; GERD (gastroesophageal reflux disease); HTN; Hypertension; Indigestion; Muscle disorder; OSTEOPOROSIS; Other specified symptom associated with female genital organs; Psychiatric disorder; PTSD (post-traumatic stress disorder); Renal disorder; Seizure ([facility]); Ulcer; and Vitamin D deficiency.    SOCIAL HISTORY  Social History     Social History Main Topics   • Smoking status: Current Every Day Smoker     Packs/day: 0.50     Years: 20.00     Types: Cigarettes   • Smokeless tobacco: Never Used      Comment: 1/2 pack per day   • Alcohol use Yes      Comment: vodka, heavy use   • Drug use: No   • Sexual activity: No       SURGICAL HISTORY   has a past surgical history that includes hernia repair (1977); cysto stent placemnt pre surg " "(10/7/2010); cystoscopy stent placement (11/9/2010); ureteroscopy (11/9/2010); lasertripsy (11/9/2010); stent removal (11/9/2010); and gastroscopy-endo (N/A, 10/3/2018).    CURRENT MEDICATIONS  Home Medications     Reviewed by Radha Coronel R.N. (Registered Nurse) on 07/07/19 at 1001  Med List Status: Not Addressed   Medication Last Dose Status   acetaminophen (TYLENOL) 325 MG Tab  Active   carBAMazepine (TEGRETOL) 200 MG Tab  Active   doxepin (SINEQUAN) 10 MG Cap  Active   fluoxetine (PROZAC) 40 MG capsule  Active   lactobacillus rhamnosus (CULTURELLE) Cap capsule  Active   lisinopril (PRINIVIL) 10 MG Tab  Active   magnesium oxide (MAG-OX) 400 (241.3 Mg) MG Tab tablet  Active   nystatin (MYCOSTATIN) 154973 UNIT/GM Cream topical cream  Active   omeprazole (PRILOSEC) 20 MG delayed-release capsule  Active   ondansetron (ZOFRAN ODT) 4 MG TABLET DISPERSIBLE  Active   pancrelipase, Lip-Prot-Amyl, (CREON 3000) 8767-1105 units Cap DR Particles  Active   polyethylene glycol/lytes (MIRALAX) Pack  Active   senna-docusate (PERICOLACE OR SENOKOT S) 8.6-50 MG Tab  Active                ALLERGIES  Allergies   Allergen Reactions   • Sulfa Drugs Vomiting   • Toradol Vomiting   • Gabapentin      Bowel incontinence     • Amoxicillin Rash     Reported by NAIDA on arrival to ED    Pt states she takes PCN 10/3       REVIEW OF SYSTEMS  See HPI for further details. Review of systems as above, otherwise all other systems are negative.     PHYSICAL EXAM  VITAL SIGNS: /81   Pulse 100   Temp 36.6 °C (97.8 °F)   Resp 15   Ht 1.676 m (5' 6\")   Wt 72.6 kg (160 lb)   LMP 11/02/2015   BMI 25.82 kg/m²     Constitutional: Well developed, well nourished. No acute distress.  HEENT: Normocephalic, atraumatic. MMM  Neck: Supple, Full range of motion   Chest/Pulmonary:  No respiratory distress.  Equal expansion   Musculoskeletal: No deformity, no edema, neurovascular intact.  Right upper extremity; tenderness to the thenar eminence and " mid palm, neurovascular intact distally.  Normal range of motion.  Nontender right wrist.  No abrasions or lacerations.  Good cap refill.  Neuro: Slurred speech, appropriate, cooperative, cranial nerves II-XII grossly intact. No tremor.   Psych: Flat      PROCEDURES     MEDICAL RECORD  I have reviewed patient's medical record and pertinent results are listed above.    DX-HAND 3+ RIGHT   Final Result         No acute osseous abnormality.          COURSE & MEDICAL DECISION MAKING  I have reviewed any medical record information, laboratory studies and radiographic results as noted above.    I you have had any blood pressure issues while here in the emergency department, please see your doctor for a further evaluation or work up.    The pt has been up, walking around, and would like to go.  She did not want to wait to see me, but the nurse informed her of her x ray results.  She has no tremor, no vomiting, and no signs of withdrawal.     Differential diagnoses include but not limited to: fracture vs strain, etoh abuse vs withdrawal.     This patient presents with a right hand contusion and etoh abuse .  At this time, I have counseled the patient/family regarding their medications, pain control, and follow up.  They will continue their medications, if any, as prescribed.  They will return immediately for any worsening symptoms and/or any other medical concerns.  They will see their doctor, or contact the doctor provided, in 1-2 days for follow up.       FINAL IMPRESSION  Right hand contusion   Alcohol abuse      Electronically signed by: Jake Mayo, 7/7/2019 11:10 AM

## 2019-07-07 NOTE — ED NOTES
"Patient in and out of [facility]. Heading her head on wall. Attempted to walk outside of room. Security kindly escorted patient back to room.   Security at bedside to talk with patient. Patient yelling \"im scared, I want my medicine\".   rn on the phone talking with erp for orders   "

## 2019-07-08 ENCOUNTER — HOSPITAL ENCOUNTER (EMERGENCY)
Dept: HOSPITAL 8 - ED | Age: 59
Discharge: HOME | End: 2019-07-08
Payer: MEDICAID

## 2019-07-08 VITALS
WEIGHT: 160 LBS | DIASTOLIC BLOOD PRESSURE: 91 MMHG | SYSTOLIC BLOOD PRESSURE: 136 MMHG | HEART RATE: 119 BPM | RESPIRATION RATE: 17 BRPM | TEMPERATURE: 98.2 F | BODY MASS INDEX: 26.66 KG/M2 | HEIGHT: 65 IN | OXYGEN SATURATION: 99 %

## 2019-07-08 VITALS — BODY MASS INDEX: 27.55 KG/M2 | WEIGHT: 165.35 LBS | HEIGHT: 65 IN

## 2019-07-08 VITALS — SYSTOLIC BLOOD PRESSURE: 144 MMHG | DIASTOLIC BLOOD PRESSURE: 85 MMHG

## 2019-07-08 DIAGNOSIS — K21.9: ICD-10-CM

## 2019-07-08 DIAGNOSIS — I10: ICD-10-CM

## 2019-07-08 DIAGNOSIS — F10.129: ICD-10-CM

## 2019-07-08 DIAGNOSIS — K29.20: Primary | ICD-10-CM

## 2019-07-08 DIAGNOSIS — E11.9: ICD-10-CM

## 2019-07-08 DIAGNOSIS — E87.6: ICD-10-CM

## 2019-07-08 LAB
ALBUMIN SERPL-MCNC: 3.7 G/DL (ref 3.4–5)
ALP SERPL-CCNC: 178 U/L (ref 45–117)
ALT SERPL-CCNC: 54 U/L (ref 12–78)
ANION GAP SERPL CALC-SCNC: 15 MMOL/L (ref 5–15)
BASOPHILS # BLD AUTO: 0.02 X10^3/UL (ref 0–0.1)
BASOPHILS NFR BLD AUTO: 0 % (ref 0–1)
BILIRUB SERPL-MCNC: 0.6 MG/DL (ref 0.2–1)
CALCIUM SERPL-MCNC: 9.1 MG/DL (ref 8.5–10.1)
CHLORIDE SERPL-SCNC: 96 MMOL/L (ref 98–107)
CREAT SERPL-MCNC: 0.63 MG/DL (ref 0.55–1.02)
CULTURE INDICATED?: YES
EOSINOPHIL # BLD AUTO: 0.02 X10^3/UL (ref 0–0.4)
EOSINOPHIL NFR BLD AUTO: 0 % (ref 1–7)
ERYTHROCYTE [DISTWIDTH] IN BLOOD BY AUTOMATED COUNT: 16.4 % (ref 9.6–15.2)
LYMPHOCYTES # BLD AUTO: 1.28 X10^3/UL (ref 1–3.4)
LYMPHOCYTES NFR BLD AUTO: 15 % (ref 22–44)
MCH RBC QN AUTO: 29.7 PG (ref 27–34.8)
MCHC RBC AUTO-ENTMCNC: 32.8 G/DL (ref 32.4–35.8)
MCV RBC AUTO: 90.5 FL (ref 80–100)
MD: NO
MICROSCOPIC: (no result)
MONOCYTES # BLD AUTO: 0.5 X10^3/UL (ref 0.2–0.8)
MONOCYTES NFR BLD AUTO: 6 % (ref 2–9)
NEUTROPHILS # BLD AUTO: 6.63 X10^3/UL (ref 1.8–6.8)
NEUTROPHILS NFR BLD AUTO: 78 % (ref 42–75)
PLATELET # BLD AUTO: 297 X10^3/UL (ref 130–400)
PMV BLD AUTO: 7.3 FL (ref 7.4–10.4)
PROT SERPL-MCNC: 7.2 G/DL (ref 6.4–8.2)
RBC # BLD AUTO: 3.85 X10^6/UL (ref 3.82–5.3)

## 2019-07-08 PROCEDURE — 93005 ELECTROCARDIOGRAM TRACING: CPT

## 2019-07-08 PROCEDURE — 85025 COMPLETE CBC W/AUTO DIFF WBC: CPT

## 2019-07-08 PROCEDURE — 87086 URINE CULTURE/COLONY COUNT: CPT

## 2019-07-08 PROCEDURE — 80053 COMPREHEN METABOLIC PANEL: CPT

## 2019-07-08 PROCEDURE — 83690 ASSAY OF LIPASE: CPT

## 2019-07-08 PROCEDURE — 99284 EMERGENCY DEPT VISIT MOD MDM: CPT

## 2019-07-08 PROCEDURE — 36415 COLL VENOUS BLD VENIPUNCTURE: CPT

## 2019-07-08 PROCEDURE — 81001 URINALYSIS AUTO W/SCOPE: CPT

## 2019-07-08 NOTE — ED NOTES
ROUNDING- PT updated on POC. Pending- IVF to finish and to have labs drawn. PT in no distress at this time. Will continue to monitor.

## 2019-07-08 NOTE — ED NOTES
START OF SHIFT- Report received from Radha IRENE. White board updated. Pt updated on POC. Pending: reassessment. PT in no distress at this time. PT currently sleeping. Will continue to monitor.

## 2019-07-08 NOTE — ED NOTES
Vital signs rechecked, patient is resting at this time. Chest rise and far equally. Patient is on continues spo2 and o2 3l nc

## 2019-07-08 NOTE — ED NOTES
PT is discharged. PT given paperwork. IV removed and bleeding controled. All questions answered. PT walked to waiting room with steady gait.

## 2019-07-08 NOTE — ED PROVIDER NOTES
ED PROVIDER NOTE    Scribed for KIYA Tucker II by Alvin Gutierrez. 7/7/2019, 7:25 PM.    This is an addendum to the note on Ashleigh Marquis. For further details and full chart entry, see the previously signed ED Provider Note written by Dr. Mayo (ERP).      7:25 PM - I discussed the patient's case with Dr. Mayo (ERP) who will transfer care of the patient to me at this time.        7:35 PM - I assumed the patient's care from Dr. Mayo. The patient is intoxicated, anion gap of 22 likely due to alcohol use. She will receive IV fluids and I plan to repeat her metabolic panel after receiving IV fluids. I will likely discharge the patient homes after she has achieved sobriety.     11:22 PM - The patient's anion gap has improved and she now has clear speech. I am comfortable discharging the patient at this time. She plans to go to a friends house from here. HR slightly elevated, likely early alcohol withdrawal. Given valium prior to discharge. No suspicion for infection.       FINAL IMPRESSION   1. Alcoholic intoxication with complication (HCC)    2. Dehydration    3. Hypokalemia       Alvin GAN (Scribe), am scribing for, and in the presence of, KIYA Tucker II.    Electronically signed by: Alvin Gutierrez (Scribe), 7/7/2019    IStefan II, M* personally performed the services described in this documentation, as scribed by Alvin Gutierrez in my presence, and it is both accurate and complete.    The note accurately reflects work and decisions made by me.  Stefan Villafana II  7/8/2019  1:23 AM

## 2019-07-08 NOTE — ED NOTES
ROUNDING- PT updated on POC. Pending- lab draw.  called to draw BMP since IVF are now done. Pt is currently sleeping. PT in no distress at this time. Will continue to monitor.

## 2019-07-10 ENCOUNTER — HOSPITAL ENCOUNTER (INPATIENT)
Dept: HOSPITAL 8 - ED | Age: 59
LOS: 2 days | Discharge: HOME | DRG: 309 | End: 2019-07-12
Attending: HOSPITALIST | Admitting: HOSPITALIST
Payer: MEDICAID

## 2019-07-10 VITALS — DIASTOLIC BLOOD PRESSURE: 94 MMHG | SYSTOLIC BLOOD PRESSURE: 158 MMHG

## 2019-07-10 VITALS — HEIGHT: 65 IN | WEIGHT: 181.22 LBS | BODY MASS INDEX: 30.19 KG/M2

## 2019-07-10 VITALS — DIASTOLIC BLOOD PRESSURE: 67 MMHG | SYSTOLIC BLOOD PRESSURE: 101 MMHG

## 2019-07-10 VITALS — DIASTOLIC BLOOD PRESSURE: 86 MMHG | SYSTOLIC BLOOD PRESSURE: 129 MMHG

## 2019-07-10 VITALS — DIASTOLIC BLOOD PRESSURE: 108 MMHG | SYSTOLIC BLOOD PRESSURE: 164 MMHG

## 2019-07-10 DIAGNOSIS — F10.239: ICD-10-CM

## 2019-07-10 DIAGNOSIS — Z59.0: ICD-10-CM

## 2019-07-10 DIAGNOSIS — K21.9: ICD-10-CM

## 2019-07-10 DIAGNOSIS — Z88.2: ICD-10-CM

## 2019-07-10 DIAGNOSIS — Z63.8: ICD-10-CM

## 2019-07-10 DIAGNOSIS — E87.6: ICD-10-CM

## 2019-07-10 DIAGNOSIS — Z88.6: ICD-10-CM

## 2019-07-10 DIAGNOSIS — E44.0: ICD-10-CM
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
## 2019-07-10 DIAGNOSIS — R00.0: Primary | ICD-10-CM

## 2019-07-10 DIAGNOSIS — Z88.8: ICD-10-CM

## 2019-07-10 LAB
ALBUMIN SERPL-MCNC: 3.7 G/DL (ref 3.4–5)
ALP SERPL-CCNC: 214 U/L (ref 45–117)
ALT SERPL-CCNC: 59 U/L (ref 12–78)
ANION GAP SERPL CALC-SCNC: 12 MMOL/L (ref 5–15)
BASOPHILS # BLD AUTO: 0.02 X10^3/UL (ref 0–0.1)
BASOPHILS NFR BLD AUTO: 0 % (ref 0–1)
BILIRUB SERPL-MCNC: 0.6 MG/DL (ref 0.2–1)
CALCIUM SERPL-MCNC: 9.4 MG/DL (ref 8.5–10.1)
CHLORIDE SERPL-SCNC: 98 MMOL/L (ref 98–107)
CREAT SERPL-MCNC: 0.65 MG/DL (ref 0.55–1.02)
EOSINOPHIL # BLD AUTO: 0.04 X10^3/UL (ref 0–0.4)
EOSINOPHIL NFR BLD AUTO: 1 % (ref 1–7)
ERYTHROCYTE [DISTWIDTH] IN BLOOD BY AUTOMATED COUNT: 16.5 % (ref 9.6–15.2)
LYMPHOCYTES # BLD AUTO: 1.06 X10^3/UL (ref 1–3.4)
LYMPHOCYTES NFR BLD AUTO: 14 % (ref 22–44)
MCH RBC QN AUTO: 30.6 PG (ref 27–34.8)
MCHC RBC AUTO-ENTMCNC: 33.4 G/DL (ref 32.4–35.8)
MCV RBC AUTO: 91.7 FL (ref 80–100)
MD: NO
MONOCYTES # BLD AUTO: 0.39 X10^3/UL (ref 0.2–0.8)
MONOCYTES NFR BLD AUTO: 5 % (ref 2–9)
NEUTROPHILS # BLD AUTO: 6.15 X10^3/UL (ref 1.8–6.8)
NEUTROPHILS NFR BLD AUTO: 80 % (ref 42–75)
PLATELET # BLD AUTO: 277 X10^3/UL (ref 130–400)
PMV BLD AUTO: 7.4 FL (ref 7.4–10.4)
PROT SERPL-MCNC: 7.6 G/DL (ref 6.4–8.2)
RBC # BLD AUTO: 4.13 X10^6/UL (ref 3.82–5.3)

## 2019-07-10 PROCEDURE — 80048 BASIC METABOLIC PNL TOTAL CA: CPT

## 2019-07-10 PROCEDURE — 80053 COMPREHEN METABOLIC PANEL: CPT

## 2019-07-10 PROCEDURE — 85025 COMPLETE CBC W/AUTO DIFF WBC: CPT

## 2019-07-10 PROCEDURE — 96372 THER/PROPH/DIAG INJ SC/IM: CPT

## 2019-07-10 PROCEDURE — 84100 ASSAY OF PHOSPHORUS: CPT

## 2019-07-10 PROCEDURE — 83735 ASSAY OF MAGNESIUM: CPT

## 2019-07-10 PROCEDURE — 96360 HYDRATION IV INFUSION INIT: CPT

## 2019-07-10 PROCEDURE — 83690 ASSAY OF LIPASE: CPT

## 2019-07-10 PROCEDURE — 80307 DRUG TEST PRSMV CHEM ANLYZR: CPT

## 2019-07-10 PROCEDURE — 36415 COLL VENOUS BLD VENIPUNCTURE: CPT

## 2019-07-10 PROCEDURE — 99285 EMERGENCY DEPT VISIT HI MDM: CPT

## 2019-07-10 PROCEDURE — 93005 ELECTROCARDIOGRAM TRACING: CPT

## 2019-07-10 RX ADMIN — LORAZEPAM PRN MG: 2 INJECTION INTRAMUSCULAR; INTRAVENOUS at 09:35

## 2019-07-10 RX ADMIN — ENOXAPARIN SODIUM SCH MG: 40 INJECTION SUBCUTANEOUS at 09:36

## 2019-07-10 RX ADMIN — SODIUM CHLORIDE AND POTASSIUM CHLORIDE SCH MLS/HR: .9; .15 SOLUTION INTRAVENOUS at 17:35

## 2019-07-10 RX ADMIN — CHLORDIAZEPOXIDE HYDROCHLORIDE SCH MG: 10 CAPSULE ORAL at 13:39

## 2019-07-10 RX ADMIN — LORAZEPAM PRN MG: 2 INJECTION INTRAMUSCULAR; INTRAVENOUS at 23:42

## 2019-07-10 RX ADMIN — LORAZEPAM PRN MG: 2 INJECTION INTRAMUSCULAR; INTRAVENOUS at 11:32

## 2019-07-10 RX ADMIN — LORAZEPAM PRN MG: 2 INJECTION INTRAMUSCULAR; INTRAVENOUS at 18:44

## 2019-07-10 RX ADMIN — CHLORDIAZEPOXIDE HYDROCHLORIDE SCH MG: 10 CAPSULE ORAL at 20:21

## 2019-07-10 RX ADMIN — ONDANSETRON PRN MG: 2 INJECTION, SOLUTION INTRAMUSCULAR; INTRAVENOUS at 20:21

## 2019-07-10 RX ADMIN — SODIUM CHLORIDE AND POTASSIUM CHLORIDE SCH MLS/HR: .9; .15 SOLUTION INTRAVENOUS at 09:36

## 2019-07-10 RX ADMIN — CHLORDIAZEPOXIDE HYDROCHLORIDE SCH MG: 10 CAPSULE ORAL at 16:00

## 2019-07-10 SDOH — SOCIAL STABILITY - SOCIAL INSECURITY: OTHER SPECIFIED PROBLEMS RELATED TO PRIMARY SUPPORT GROUP: Z63.8

## 2019-07-10 SDOH — ECONOMIC STABILITY - HOUSING INSECURITY: HOMELESSNESS: Z59.0

## 2019-07-11 VITALS — SYSTOLIC BLOOD PRESSURE: 142 MMHG | DIASTOLIC BLOOD PRESSURE: 89 MMHG

## 2019-07-11 VITALS — SYSTOLIC BLOOD PRESSURE: 157 MMHG | DIASTOLIC BLOOD PRESSURE: 99 MMHG

## 2019-07-11 VITALS — DIASTOLIC BLOOD PRESSURE: 95 MMHG | SYSTOLIC BLOOD PRESSURE: 152 MMHG

## 2019-07-11 VITALS — DIASTOLIC BLOOD PRESSURE: 91 MMHG | SYSTOLIC BLOOD PRESSURE: 141 MMHG

## 2019-07-11 LAB
ANION GAP SERPL CALC-SCNC: 5 MMOL/L (ref 5–15)
BASOPHILS # BLD AUTO: 0.02 X10^3/UL (ref 0–0.1)
BASOPHILS NFR BLD AUTO: 1 % (ref 0–1)
CALCIUM SERPL-MCNC: 8.7 MG/DL (ref 8.5–10.1)
CHLORIDE SERPL-SCNC: 107 MMOL/L (ref 98–107)
CREAT SERPL-MCNC: 0.64 MG/DL (ref 0.55–1.02)
EOSINOPHIL # BLD AUTO: 0.09 X10^3/UL (ref 0–0.4)
EOSINOPHIL NFR BLD AUTO: 2 % (ref 1–7)
ERYTHROCYTE [DISTWIDTH] IN BLOOD BY AUTOMATED COUNT: 16.9 % (ref 9.6–15.2)
LYMPHOCYTES # BLD AUTO: 1.31 X10^3/UL (ref 1–3.4)
LYMPHOCYTES NFR BLD AUTO: 34 % (ref 22–44)
MCH RBC QN AUTO: 29.8 PG (ref 27–34.8)
MCHC RBC AUTO-ENTMCNC: 32.6 G/DL (ref 32.4–35.8)
MCV RBC AUTO: 91.4 FL (ref 80–100)
MD: NO
MONOCYTES # BLD AUTO: 0.22 X10^3/UL (ref 0.2–0.8)
MONOCYTES NFR BLD AUTO: 6 % (ref 2–9)
NEUTROPHILS # BLD AUTO: 2.25 X10^3/UL (ref 1.8–6.8)
NEUTROPHILS NFR BLD AUTO: 58 % (ref 42–75)
PLATELET # BLD AUTO: 201 X10^3/UL (ref 130–400)
PMV BLD AUTO: 8 FL (ref 7.4–10.4)
RBC # BLD AUTO: 3.74 X10^6/UL (ref 3.82–5.3)

## 2019-07-11 RX ADMIN — LORAZEPAM PRN MG: 2 INJECTION INTRAMUSCULAR; INTRAVENOUS at 03:04

## 2019-07-11 RX ADMIN — CHLORDIAZEPOXIDE HYDROCHLORIDE SCH MG: 10 CAPSULE ORAL at 20:13

## 2019-07-11 RX ADMIN — ENOXAPARIN SODIUM SCH MG: 40 INJECTION SUBCUTANEOUS at 08:39

## 2019-07-11 RX ADMIN — Medication SCH MG: at 20:13

## 2019-07-11 RX ADMIN — ONDANSETRON PRN MG: 2 INJECTION, SOLUTION INTRAMUSCULAR; INTRAVENOUS at 20:13

## 2019-07-11 RX ADMIN — CHLORDIAZEPOXIDE HYDROCHLORIDE SCH MG: 10 CAPSULE ORAL at 15:58

## 2019-07-11 RX ADMIN — CHLORDIAZEPOXIDE HYDROCHLORIDE SCH MG: 10 CAPSULE ORAL at 10:41

## 2019-07-11 RX ADMIN — CHLORDIAZEPOXIDE HYDROCHLORIDE SCH MG: 10 CAPSULE ORAL at 05:58

## 2019-07-11 RX ADMIN — SODIUM CHLORIDE AND POTASSIUM CHLORIDE SCH MLS/HR: .9; .15 SOLUTION INTRAVENOUS at 10:41

## 2019-07-11 RX ADMIN — LORAZEPAM PRN MG: 2 INJECTION INTRAMUSCULAR; INTRAVENOUS at 08:39

## 2019-07-11 RX ADMIN — Medication SCH MG: at 08:39

## 2019-07-11 RX ADMIN — LORAZEPAM PRN MG: 2 INJECTION INTRAMUSCULAR; INTRAVENOUS at 13:12

## 2019-07-11 RX ADMIN — SODIUM CHLORIDE AND POTASSIUM CHLORIDE SCH MLS/HR: .9; .15 SOLUTION INTRAVENOUS at 01:45

## 2019-07-12 VITALS — SYSTOLIC BLOOD PRESSURE: 165 MMHG | DIASTOLIC BLOOD PRESSURE: 109 MMHG

## 2019-07-12 VITALS — SYSTOLIC BLOOD PRESSURE: 137 MMHG | DIASTOLIC BLOOD PRESSURE: 85 MMHG

## 2019-07-12 VITALS — SYSTOLIC BLOOD PRESSURE: 151 MMHG | DIASTOLIC BLOOD PRESSURE: 83 MMHG

## 2019-07-12 VITALS — SYSTOLIC BLOOD PRESSURE: 171 MMHG | DIASTOLIC BLOOD PRESSURE: 102 MMHG

## 2019-07-12 VITALS — DIASTOLIC BLOOD PRESSURE: 101 MMHG | SYSTOLIC BLOOD PRESSURE: 158 MMHG

## 2019-07-12 LAB
ALBUMIN SERPL-MCNC: 3.2 G/DL (ref 3.4–5)
ALP SERPL-CCNC: 200 U/L (ref 45–117)
ALT SERPL-CCNC: 40 U/L (ref 12–78)
ANION GAP SERPL CALC-SCNC: 5 MMOL/L (ref 5–15)
BASOPHILS # BLD AUTO: 0.02 X10^3/UL (ref 0–0.1)
BASOPHILS NFR BLD AUTO: 1 % (ref 0–1)
BILIRUB SERPL-MCNC: 0.3 MG/DL (ref 0.2–1)
CALCIUM SERPL-MCNC: 9.4 MG/DL (ref 8.5–10.1)
CHLORIDE SERPL-SCNC: 107 MMOL/L (ref 98–107)
CREAT SERPL-MCNC: 0.51 MG/DL (ref 0.55–1.02)
EOSINOPHIL # BLD AUTO: 0.13 X10^3/UL (ref 0–0.4)
EOSINOPHIL NFR BLD AUTO: 3 % (ref 1–7)
ERYTHROCYTE [DISTWIDTH] IN BLOOD BY AUTOMATED COUNT: 17.3 % (ref 9.6–15.2)
LYMPHOCYTES # BLD AUTO: 1.26 X10^3/UL (ref 1–3.4)
LYMPHOCYTES NFR BLD AUTO: 30 % (ref 22–44)
MCH RBC QN AUTO: 30.7 PG (ref 27–34.8)
MCHC RBC AUTO-ENTMCNC: 32.7 G/DL (ref 32.4–35.8)
MCV RBC AUTO: 93.9 FL (ref 80–100)
MD: NO
MONOCYTES # BLD AUTO: 0.26 X10^3/UL (ref 0.2–0.8)
MONOCYTES NFR BLD AUTO: 6 % (ref 2–9)
NEUTROPHILS # BLD AUTO: 2.57 X10^3/UL (ref 1.8–6.8)
NEUTROPHILS NFR BLD AUTO: 61 % (ref 42–75)
PLATELET # BLD AUTO: 197 X10^3/UL (ref 130–400)
PMV BLD AUTO: 8.5 FL (ref 7.4–10.4)
PROT SERPL-MCNC: 6.7 G/DL (ref 6.4–8.2)
RBC # BLD AUTO: 3.97 X10^6/UL (ref 3.82–5.3)

## 2019-07-12 RX ADMIN — CHLORDIAZEPOXIDE HYDROCHLORIDE SCH MG: 10 CAPSULE ORAL at 15:44

## 2019-07-12 RX ADMIN — ONDANSETRON PRN MG: 2 INJECTION, SOLUTION INTRAMUSCULAR; INTRAVENOUS at 13:48

## 2019-07-12 RX ADMIN — CHLORDIAZEPOXIDE HYDROCHLORIDE SCH MG: 10 CAPSULE ORAL at 10:32

## 2019-07-12 RX ADMIN — ONDANSETRON PRN MG: 2 INJECTION, SOLUTION INTRAMUSCULAR; INTRAVENOUS at 07:25

## 2019-07-12 RX ADMIN — ACETAMINOPHEN PRN MG: 325 TABLET, FILM COATED ORAL at 01:03

## 2019-07-12 RX ADMIN — Medication SCH MG: at 08:28

## 2019-07-12 RX ADMIN — ACETAMINOPHEN PRN MG: 325 TABLET, FILM COATED ORAL at 15:44

## 2019-07-12 RX ADMIN — CHLORDIAZEPOXIDE HYDROCHLORIDE SCH MG: 10 CAPSULE ORAL at 05:02

## 2019-07-12 RX ADMIN — ENOXAPARIN SODIUM SCH MG: 40 INJECTION SUBCUTANEOUS at 08:28

## 2019-07-13 ENCOUNTER — HOSPITAL ENCOUNTER (EMERGENCY)
Dept: HOSPITAL 8 - ED | Age: 59
Discharge: HOME | End: 2019-07-13
Payer: MEDICAID

## 2019-07-13 VITALS — HEIGHT: 65 IN | WEIGHT: 165.35 LBS | BODY MASS INDEX: 27.55 KG/M2

## 2019-07-13 VITALS — SYSTOLIC BLOOD PRESSURE: 118 MMHG | DIASTOLIC BLOOD PRESSURE: 67 MMHG

## 2019-07-13 DIAGNOSIS — R11.2: ICD-10-CM

## 2019-07-13 DIAGNOSIS — F10.220: Primary | ICD-10-CM

## 2019-07-13 DIAGNOSIS — E11.9: ICD-10-CM

## 2019-07-13 DIAGNOSIS — B35.4: ICD-10-CM

## 2019-07-13 DIAGNOSIS — K21.9: ICD-10-CM

## 2019-07-13 DIAGNOSIS — F17.200: ICD-10-CM

## 2019-07-13 DIAGNOSIS — I10: ICD-10-CM

## 2019-07-13 LAB
ALBUMIN SERPL-MCNC: 3.5 G/DL (ref 3.4–5)
ANION GAP SERPL CALC-SCNC: 10 MMOL/L (ref 5–15)
BASOPHILS # BLD AUTO: 0.01 X10^3/UL (ref 0–0.1)
BASOPHILS NFR BLD AUTO: 0 % (ref 0–1)
CALCIUM SERPL-MCNC: 9.9 MG/DL (ref 8.5–10.1)
CHLORIDE SERPL-SCNC: 107 MMOL/L (ref 98–107)
CREAT SERPL-MCNC: 0.84 MG/DL (ref 0.55–1.02)
CULTURE INDICATED?: YES
EOSINOPHIL # BLD AUTO: 0.06 X10^3/UL (ref 0–0.4)
EOSINOPHIL NFR BLD AUTO: 1 % (ref 1–7)
ERYTHROCYTE [DISTWIDTH] IN BLOOD BY AUTOMATED COUNT: 16.9 % (ref 9.6–15.2)
LYMPHOCYTES # BLD AUTO: 1.08 X10^3/UL (ref 1–3.4)
LYMPHOCYTES NFR BLD AUTO: 19 % (ref 22–44)
MCH RBC QN AUTO: 31 PG (ref 27–34.8)
MCHC RBC AUTO-ENTMCNC: 33.2 G/DL (ref 32.4–35.8)
MCV RBC AUTO: 93.5 FL (ref 80–100)
MD: NO
MICROSCOPIC: (no result)
MONOCYTES # BLD AUTO: 0.39 X10^3/UL (ref 0.2–0.8)
MONOCYTES NFR BLD AUTO: 7 % (ref 2–9)
NEUTROPHILS # BLD AUTO: 4.25 X10^3/UL (ref 1.8–6.8)
NEUTROPHILS NFR BLD AUTO: 73 % (ref 42–75)
PLATELET # BLD AUTO: 198 X10^3/UL (ref 130–400)
PMV BLD AUTO: 7.9 FL (ref 7.4–10.4)
RBC # BLD AUTO: 3.96 X10^6/UL (ref 3.82–5.3)

## 2019-07-13 PROCEDURE — 99284 EMERGENCY DEPT VISIT MOD MDM: CPT

## 2019-07-13 PROCEDURE — 80048 BASIC METABOLIC PNL TOTAL CA: CPT

## 2019-07-13 PROCEDURE — 87077 CULTURE AEROBIC IDENTIFY: CPT

## 2019-07-13 PROCEDURE — 36415 COLL VENOUS BLD VENIPUNCTURE: CPT

## 2019-07-13 PROCEDURE — 93005 ELECTROCARDIOGRAM TRACING: CPT

## 2019-07-13 PROCEDURE — 85025 COMPLETE CBC W/AUTO DIFF WBC: CPT

## 2019-07-13 PROCEDURE — 87086 URINE CULTURE/COLONY COUNT: CPT

## 2019-07-13 PROCEDURE — 82040 ASSAY OF SERUM ALBUMIN: CPT

## 2019-07-13 PROCEDURE — 81001 URINALYSIS AUTO W/SCOPE: CPT

## 2019-07-14 ENCOUNTER — HOSPITAL ENCOUNTER (EMERGENCY)
Facility: MEDICAL CENTER | Age: 59
End: 2019-07-14
Attending: EMERGENCY MEDICINE
Payer: MEDICAID

## 2019-07-14 ENCOUNTER — APPOINTMENT (OUTPATIENT)
Dept: RADIOLOGY | Facility: MEDICAL CENTER | Age: 59
End: 2019-07-14
Attending: EMERGENCY MEDICINE
Payer: MEDICAID

## 2019-07-14 ENCOUNTER — HOSPITAL ENCOUNTER (EMERGENCY)
Facility: MEDICAL CENTER | Age: 59
End: 2019-07-15
Attending: EMERGENCY MEDICINE
Payer: MEDICAID

## 2019-07-14 VITALS
SYSTOLIC BLOOD PRESSURE: 150 MMHG | TEMPERATURE: 97.5 F | WEIGHT: 160 LBS | OXYGEN SATURATION: 92 % | HEIGHT: 65 IN | BODY MASS INDEX: 26.66 KG/M2 | HEART RATE: 100 BPM | DIASTOLIC BLOOD PRESSURE: 90 MMHG | RESPIRATION RATE: 17 BRPM

## 2019-07-14 DIAGNOSIS — F10.929 ALCOHOLIC INTOXICATION WITH COMPLICATION (HCC): ICD-10-CM

## 2019-07-14 DIAGNOSIS — M19.171 POST-TRAUMATIC OSTEOARTHRITIS OF RIGHT ANKLE: ICD-10-CM

## 2019-07-14 LAB
AMPHET UR QL SCN: NEGATIVE
BARBITURATES UR QL SCN: NEGATIVE
BENZODIAZ UR QL SCN: POSITIVE
BZE UR QL SCN: NEGATIVE
CANNABINOIDS UR QL SCN: NEGATIVE
METHADONE UR QL SCN: NEGATIVE
OPIATES UR QL SCN: NEGATIVE
OXYCODONE UR QL SCN: NEGATIVE
PCP UR QL SCN: NEGATIVE
POC BREATHALIZER: 0.22 PERCENT (ref 0–0.01)
PROPOXYPH UR QL SCN: NEGATIVE

## 2019-07-14 PROCEDURE — 80307 DRUG TEST PRSMV CHEM ANLYZR: CPT

## 2019-07-14 PROCEDURE — 302970 POC BREATHALIZER

## 2019-07-14 PROCEDURE — 302970 POC BREATHALIZER: Performed by: EMERGENCY MEDICINE

## 2019-07-14 PROCEDURE — 99285 EMERGENCY DEPT VISIT HI MDM: CPT

## 2019-07-14 PROCEDURE — 302875 HCHG BANDAGE ACE 4 OR 6""

## 2019-07-14 PROCEDURE — A9270 NON-COVERED ITEM OR SERVICE: HCPCS | Performed by: EMERGENCY MEDICINE

## 2019-07-14 PROCEDURE — 99284 EMERGENCY DEPT VISIT MOD MDM: CPT

## 2019-07-14 PROCEDURE — 73610 X-RAY EXAM OF ANKLE: CPT | Mod: RT

## 2019-07-14 PROCEDURE — 700102 HCHG RX REV CODE 250 W/ 637 OVERRIDE(OP): Performed by: EMERGENCY MEDICINE

## 2019-07-14 PROCEDURE — 73630 X-RAY EXAM OF FOOT: CPT | Mod: RT

## 2019-07-14 RX ORDER — LORAZEPAM 2 MG/ML
INJECTION INTRAMUSCULAR
Status: DISCONTINUED
Start: 2019-07-14 | End: 2019-07-15 | Stop reason: HOSPADM

## 2019-07-14 RX ORDER — DIPHENHYDRAMINE HYDROCHLORIDE 50 MG/ML
INJECTION INTRAMUSCULAR; INTRAVENOUS
Status: DISCONTINUED
Start: 2019-07-14 | End: 2019-07-15 | Stop reason: HOSPADM

## 2019-07-14 RX ORDER — HALOPERIDOL 5 MG/ML
INJECTION INTRAMUSCULAR
Status: DISCONTINUED
Start: 2019-07-14 | End: 2019-07-15 | Stop reason: HOSPADM

## 2019-07-14 RX ORDER — ACETAMINOPHEN 325 MG/1
650 TABLET ORAL ONCE
Status: COMPLETED | OUTPATIENT
Start: 2019-07-14 | End: 2019-07-14

## 2019-07-14 RX ORDER — LORAZEPAM 1 MG/1
1 TABLET ORAL ONCE
Status: COMPLETED | OUTPATIENT
Start: 2019-07-14 | End: 2019-07-14

## 2019-07-14 RX ADMIN — LORAZEPAM 1 MG: 1 TABLET ORAL at 23:17

## 2019-07-14 RX ADMIN — ACETAMINOPHEN 650 MG: 325 TABLET, FILM COATED ORAL at 13:15

## 2019-07-14 ASSESSMENT — LIFESTYLE VARIABLES
HAVE PEOPLE ANNOYED YOU BY CRITICIZING YOUR DRINKING: YES
TOTAL SCORE: 4
TOTAL SCORE: 4
ON A TYPICAL DAY WHEN YOU DRINK ALCOHOL HOW MANY DRINKS DO YOU HAVE: 4
EVER HAD A DRINK FIRST THING IN THE MORNING TO STEADY YOUR NERVES TO GET RID OF A HANGOVER: YES
TOTAL SCORE: 4
EVER FELT BAD OR GUILTY ABOUT YOUR DRINKING: YES
HAVE YOU EVER FELT YOU SHOULD CUT DOWN ON YOUR DRINKING: YES
AVERAGE NUMBER OF DAYS PER WEEK YOU HAVE A DRINK CONTAINING ALCOHOL: 6
DOES PATIENT WANT TO STOP DRINKING: NO
CONSUMPTION TOTAL: POSITIVE
DO YOU DRINK ALCOHOL: YES
HOW MANY TIMES IN THE PAST YEAR HAVE YOU HAD 5 OR MORE DRINKS IN A DAY: 6

## 2019-07-14 NOTE — ED TRIAGE NOTES
"Chief Complaint   Patient presents with   • Ankle Swelling     right side     Pt bib ems for right ankle pain and swelling, pt unable to tell me how she injured her right ankle, she said \" I have been just walking on it\".   C/o tingling and pain, +circulation and movement, 2+pedal pulse  "

## 2019-07-15 VITALS
BODY MASS INDEX: 26.21 KG/M2 | HEIGHT: 67 IN | HEART RATE: 98 BPM | RESPIRATION RATE: 16 BRPM | OXYGEN SATURATION: 96 % | DIASTOLIC BLOOD PRESSURE: 101 MMHG | TEMPERATURE: 97.4 F | SYSTOLIC BLOOD PRESSURE: 173 MMHG | WEIGHT: 167 LBS

## 2019-07-15 LAB — POC BREATHALIZER: 0.07 PERCENT (ref 0–0.01)

## 2019-07-15 PROCEDURE — 302970 POC BREATHALIZER: Performed by: EMERGENCY MEDICINE

## 2019-07-15 PROCEDURE — 700102 HCHG RX REV CODE 250 W/ 637 OVERRIDE(OP): Performed by: EMERGENCY MEDICINE

## 2019-07-15 PROCEDURE — 90791 PSYCH DIAGNOSTIC EVALUATION: CPT

## 2019-07-15 PROCEDURE — A9270 NON-COVERED ITEM OR SERVICE: HCPCS | Performed by: EMERGENCY MEDICINE

## 2019-07-15 RX ORDER — LORAZEPAM 1 MG/1
0.5 TABLET ORAL ONCE
Status: COMPLETED | OUTPATIENT
Start: 2019-07-15 | End: 2019-07-15

## 2019-07-15 RX ADMIN — LORAZEPAM 0.5 MG: 1 TABLET ORAL at 05:22

## 2019-07-15 NOTE — ED TRIAGE NOTES
Pt brought in by EMS with chief complaint of suicidal ideation, pt states she was trying to kill herself by cutting her wrist, When PD came the patient had a broken beer bottle and was observed cutting her wrists with the bottle.     [employer] placed patient on [employer], all belongings removed and searched by security, all cords removed from the room,  Pt educated on L2K process, pt verbalized understand

## 2019-07-15 NOTE — ED NOTES
"Pt states she needs to use the restroom, this nurse was occupied with another patient and asked the patient to allow for just a moment, the patient states \"its been to long\" and proceeds to urinate in the sink. This was witness by the sitter, this nurse educated patient on the appropriate use of the sink and the toilet, MD aware  "

## 2019-07-15 NOTE — ED PROVIDER NOTES
ED Provider Note    CHIEF COMPLAINT  Chief Complaint   Patient presents with   • Suicidal Ideation       HPI  Ashleigh Marquis is a 58 y.o. female who presents with apparent suicidal ideation by report.  PD noted that the patient had a broken beer bottle and was observed cutting her wrist.  She was placed on a legal hold and brought into the emergency department for further evaluation.  The patient has slurred speech and is unwilling to share further history regarding the day.  She is currently denying any active suicidal ideation.  Denies actual attempt at self-harm.  Does admit to drinking alcohol earlier in the day.  Denies any other medical issues at this time.    REVIEW OF SYSTEMS  See HPI for further details. All other systems are negative.     PAST MEDICAL HISTORY   has a past medical history of Alcoholism ([facility]); Anxiety; Arthritis; ASTHMA; Cancer ([facility]) (1981); Chronic low back pain; Congestive heart failure ([facility]); Depression; EtOH dependence ([facility]); Fall; GERD (gastroesophageal reflux disease); HTN; Hypertension; Indigestion; Muscle disorder; OSTEOPOROSIS; Other specified symptom associated with female genital organs; Psychiatric disorder; PTSD (post-traumatic stress disorder); Renal disorder; Seizure ([facility]); Ulcer; and Vitamin D deficiency.    SOCIAL HISTORY  Social History     Social History Main Topics   • Smoking status: Current Every Day Smoker     Packs/day: 0.50     Years: 20.00     Types: Cigarettes   • Smokeless tobacco: Never Used      Comment: 1/2 pack per day   • Alcohol use Yes      Comment: vodka, heavy use   • Drug use: No   • Sexual activity: No       SURGICAL HISTORY   has a past surgical history that includes hernia repair (1977); cysto stent placemnt pre surg (10/7/2010); cystoscopy stent placement (11/9/2010); ureteroscopy (11/9/2010); lasertripsy (11/9/2010); stent removal (11/9/2010); and gastroscopy-endo (N/A, 10/3/2018).    CURRENT MEDICATIONS  Home Medications     Reviewed by Savanah PRINCE  "Aretha Castaneda (Pharmacy Tech) on 07/14/19 at 2213  Med List Status: Unable to Obtain   Medication Last Dose Status   acetaminophen (TYLENOL) 325 MG Tab  Active   carBAMazepine (TEGRETOL) 200 MG Tab  Active   doxepin (SINEQUAN) 10 MG Cap  Active   fluoxetine (PROZAC) 40 MG capsule  Active   lactobacillus rhamnosus (CULTURELLE) Cap capsule  Active   lisinopril (PRINIVIL) 10 MG Tab  Active   magnesium oxide (MAG-OX) 400 (241.3 Mg) MG Tab tablet  Active   nystatin (MYCOSTATIN) 010639 UNIT/GM Cream topical cream  Active   omeprazole (PRILOSEC) 20 MG delayed-release capsule  Active   ondansetron (ZOFRAN ODT) 4 MG TABLET DISPERSIBLE  Active   pancrelipase, Lip-Prot-Amyl, (CREON 3000) 4997-6733 units Cap DR Particles  Active   polyethylene glycol/lytes (MIRALAX) Pack  Active   senna-docusate (PERICOLACE OR SENOKOT S) 8.6-50 MG Tab  Active                ALLERGIES  Allergies   Allergen Reactions   • Sulfa Drugs Vomiting   • Toradol Vomiting   • Gabapentin      Bowel incontinence     • Amoxicillin Rash     Reported by NAIDA on arrival to ED    Pt states she takes PCN 10/3       PHYSICAL EXAM  VITAL SIGNS: /96   Pulse (!) 109   Temp 36.6 °C (97.9 °F) (Temporal)   Resp 16   Ht 1.702 m (5' 7\")   Wt 75.8 kg (167 lb)   LMP 11/02/2015   SpO2 94%   BMI 26.16 kg/m²   Pulse ox interpretation: I interpret this pulse ox as normal.  Constitutional: Alert in no apparent distress.  HENT: No signs of trauma, Bilateral external ears normal, Nose normal.   Eyes: Pupils are equal and reactive, Conjunctiva normal, Non-icteric.   Neck: Normal range of motion, No tenderness, Supple, No stridor.   Cardiovascular: Regular rate and rhythm.   Thorax & Lungs: Normal breath sounds, No respiratory distress  Skin: Warm, Dry, No erythema, No rash.  Faint superficial scratch to the volar surface of the distal left forearm.  Back: No bony tenderness, No CVA tenderness.   Extremities: Intact distal pulses, No edema, No tenderness, No " cyanosis  Neurologic: Alert, slurred speech and odor of alcohol, normal motor function and gait, Normal sensory function, No focal deficits noted.   Psychiatric: Denies suicidal ideation.      DIAGNOSTIC STUDIES / PROCEDURES    LABS  Labs Reviewed   URINE DRUG SCREEN - Abnormal; Notable for the following:        Result Value    Benzodiazepines Positive (*)     All other components within normal limits   POC BREATHALIZER - Abnormal; Notable for the following:     POC Breathalizer 0.224 (*)     All other components within normal limits       COURSE & MEDICAL DECISION MAKING    Medications   LORAZEPAM 2 MG/ML INJ SOLN (  Canceled Entry 7/14/19 2115)   DIPHENHYDRAMINE HCL 50 MG/ML INJ SOLN (  Canceled Entry 7/14/19 2115)   HALOPERIDOL LACTATE 5 MG/ML INJ SOLN (  Canceled Entry 7/14/19 2115)   LORazepam (ATIVAN) tablet 1 mg (1 mg Oral Given 7/14/19 2317)       Pertinent Labs & Imaging studies reviewed. (See chart for details)  58 y.o. female presenting on a legal hold after she was witnessed trying to cut her wrist.  She initially by report noted suicidal ideation however the patient denies this and denies any active suicidal thoughts.  Does admit to drinking alcohol earlier in the day.  The patient is very well-known to this emergency department with multiple visits in the past.  Tends to have very aggressive and erratic behavior.  Has a known history of chronic alcohol abuse.  She states that she is homeless.    Patient was given some oral Ativan to help calm her here.  She has been resting.  Will await until the patient is sober for further psychiatric evaluation by alert team to ensure a safe discharge plan.  If the patient maintains that she is not actively suicidal and there is a safe discharge plan for this patient, it may be appropriate to discharge the patient home.  If she maintains suicidal ideation, she may require further inpatient psychiatric evaluation or possibly evaluation by a psychiatrist in the  "morning.    Throughout the patient's stay, there has been no signs of active alcohol withdrawal.  Will sign out the patient to the oncoming physician to follow-up on the final safe discharge plan.    The patient was instructed to follow-up with primary care physician for further management.  To return immediately for any worsening symptoms or development of any other concerning signs or symptoms. The patient verbalizes understanding in their own words.    /96   Pulse (!) 109   Temp 36.6 °C (97.9 °F) (Temporal)   Resp 16   Ht 1.702 m (5' 7\")   Wt 75.8 kg (167 lb)   LMP 11/02/2015   SpO2 94%   BMI 26.16 kg/m²     The patient was referred to primary care where they will receive further BP management.      [facility], Emergency Dept  [address] [place] [id number] [phone]    As needed, If symptoms worsen      FINAL IMPRESSION  1. Suicidal ideation    2. Alcoholic intoxication with complication (HCC)            Electronically signed by: Edwardo Goldstein, 7/14/2019 8:15 PM    "
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
## 2019-07-15 NOTE — ED NOTES
"Pt began yelling and screaming unintelligible words and became extremely agitated with staff, this nurse was able to redirect the patient and remind the patient she was in the hospital and that \"no one will kill her\", pt is extremely agitated, security at bedside, pt returned to bed without assistance, MD aware    "

## 2019-07-15 NOTE — ED NOTES
"Pt demanding food and water, stating she is being mistreated stating \"you're trying to trap me in and I have rights\" this nurse educated pt on rules and policies of being on a Legal [employer], pt verbalized understanding     "

## 2019-07-15 NOTE — ED NOTES
"This nurse again educated patient on fall preventions and safety prevents. Pt stated \"fuck you, you bitch, i'm leaving\" pt proceeded to walk out of the room and down the [place], this nurse called security and followed patient down the [place] tor[place] until security arrived, pt was returned to her room willingly and stated \"you all are fucked assholes, I just wanted to walk\" MD aware  "

## 2019-07-15 NOTE — ED NOTES
"Pt yelling very loudly at bedside, pt has no c/o, states \"I just want tea you fucking piece of shit\", this nurse educated patient on the process in the emergency room and need for and order from the doctor before she can eat or drink.  Pt stated understanding  "

## 2019-07-15 NOTE — ED PROVIDER NOTES
"ED Provider Note    CHIEF COMPLAINT  Chief Complaint   Patient presents with   • Ankle Swelling     right side       HPI  Ashleigh Marquis is a 58 y.o. female who presents to emergency room today with complaints of swelling and pain to right foot and ankle.  Patient states this started last several days she states she does a lot of walking on it became painful swollen.  No nausea no vomiting no fever chills denies any injury to the area she had a previous history of fracture.  Does admit to drinking only one beer today has a history of alcohol abuse.  Patient is able to ambulate on it but does have some pain.    REVIEW OF SYSTEMS  See HPI for further details. All other systems are negative.     PAST MEDICAL HISTORY  Past Medical History:   Diagnosis Date   • Cancer (HCC) 1981    cervical   • Alcoholism (HCC)    • Anxiety    • Arthritis    • ASTHMA    • Chronic low back pain    • Congestive heart failure (HCC)    • Depression    • EtOH dependence (HCC)    • Fall     alcohol related   • GERD (gastroesophageal reflux disease)    • HTN    • Hypertension    • Indigestion     GERD   • Muscle disorder    • OSTEOPOROSIS    • Other specified symptom associated with female genital organs     \"I have fibroids bad\"\"   • Psychiatric disorder    • PTSD (post-traumatic stress disorder)    • Renal disorder     Hx of stones   • Seizure (HCC)     several years ago r/t alcohol withdrawl   • Ulcer    • Vitamin D deficiency        FAMILY HISTORY  [unfilled]    SOCIAL HISTORY  Social History     Social History   • Marital status:      Spouse name: N/A   • Number of children: N/A   • Years of education: N/A     Social History Main Topics   • Smoking status: Current Every Day Smoker     Packs/day: 0.50     Years: 20.00     Types: Cigarettes   • Smokeless tobacco: Never Used      Comment: 1/2 pack per day   • Alcohol use Yes      Comment: vodka, heavy use   • Drug use: No   • Sexual activity: No     Other Topics Concern   • Not on " file     Social History Narrative    ** Merged History Encounter **            SURGICAL HISTORY  Past Surgical History:   Procedure Laterality Date   • GASTROSCOPY-ENDO N/A 10/3/2018    Procedure: GASTROSCOPY-ENDO;  Surgeon: Ben Negro M.D.;  Location: ENDOSCOPY [facility];  Service: Gastroenterology   • CYSTOSCOPY STENT PLACEMENT  11/9/2010    Performed by ANGEL HARRIS at [facility]   • URETEROSCOPY  11/9/2010    Performed by ANGEL HARRIS at [facility]   • LASERTRIPSY  11/9/2010    Performed by ANGEL HARRIS at [facility]   • STENT REMOVAL  11/9/2010    Performed by ANGEL HARRIS at [facility]   • CYSTO STENT PLACEMNT PRE SURG  10/7/2010    Performed by ANGEL HARRIS at [facility]   • HERNIA REPAIR  1977       CURRENT MEDICATIONS  Home Medications     Reviewed by Era Gaspar R.N. (Registered Nurse) on 07/14/19 at 1230  Med List Status: Unable to Obtain   Medication Last Dose Status   acetaminophen (TYLENOL) 325 MG Tab  Active   carBAMazepine (TEGRETOL) 200 MG Tab  Active   doxepin (SINEQUAN) 10 MG Cap  Active   fluoxetine (PROZAC) 40 MG capsule  Active   lactobacillus rhamnosus (CULTURELLE) Cap capsule  Active   lisinopril (PRINIVIL) 10 MG Tab  Active   magnesium oxide (MAG-OX) 400 (241.3 Mg) MG Tab tablet  Active   nystatin (MYCOSTATIN) 445348 UNIT/GM Cream topical cream  Active   omeprazole (PRILOSEC) 20 MG delayed-release capsule  Active   ondansetron (ZOFRAN ODT) 4 MG TABLET DISPERSIBLE  Active   pancrelipase, Lip-Prot-Amyl, (CREON 3000) 6601-3165 units Cap DR Particles  Active   polyethylene glycol/lytes (MIRALAX) Pack  Active   senna-docusate (PERICOLACE OR SENOKOT S) 8.6-50 MG Tab  Active                ALLERGIES  Allergies   Allergen Reactions   • Sulfa Drugs Vomiting   • Toradol Vomiting   • Gabapentin      Bowel incontinence     • Amoxicillin Rash     Reported by NAIDA on arrival to ED    Pt states she takes PCN  "10/3       PHYSICAL EXAM  VITAL SIGNS: /90   Pulse 100   Temp 36.4 °C (97.5 °F) (Temporal)   Resp 17   Ht 1.651 m (5' 5\")   Wt 72.6 kg (160 lb)   LMP 11/02/2015   SpO2 92%   BMI 26.63 kg/m²  Room air O2: 92    Constitutional: Well developed, Well nourished, No acute distress, Non-toxic appearance.   HENT: Normocephalic, Atraumatic, Bilateral external ears normal, Oropharynx moist, No oral exudates, Nose normal.   Eyes:Conjunctiva normal, No discharge.   Neck: Normal range of motion, No tenderness, Supple, No stridor.   Lymphatic: No lymphadenopathy noted.   Cardiovascular: Normal heart rate, Normal rhythm, No murmurs, No rubs, No gallops.   Thorax & Lungs: Normal breath sounds, No respiratory distress, No wheezing, No chest tenderness.   Abdomen: Bowel sounds normal, Soft, No tenderness, No masses, No pulsatile masses.   Skin: Warm, Dry, No erythema, No rash.   Back: No tenderness, No CVA tenderness.   Extremities: Intact distal pulses, right ankle and foot lateral edema, right ankle and foot tenderness, No cyanosis, No clubbing.  No gross deformities pulses sensation intact distally.  Musculoskeletal: Range of motion is limited to the right ankle with inversion and plantarflexion.  Neurologic: Alert & oriented x 3, Normal motor function, Normal sensory function, No focal deficits noted.   Psychiatric: Affect normal, Judgment poor, Mood normal.         RADIOLOGY/PROCEDURES  DX-ANKLE 3+ VIEWS RIGHT   Final Result         1.  Remote distal fibular fracture.      2.  Diffuse soft tissue swelling around the ankle joint.      3.  Early degenerative changes in the tibiotalar joint.      DX-FOOT-COMPLETE 3+ RIGHT   Final Result      1.  No acute findings.      2.  Mild degenerative changes in the first MTP joint.            COURSE & MEDICAL DECISION MAKING  Pertinent Labs & Imaging studies reviewed. (See chart for details)  Patient had a remote distal fibular fracture there is some degenerative changes noted " most likely causing the swelling from her walking on it we described this to the patient and reviewed with the patient.  Advised ice elevation rest she is placed in Ace wrap may use Tylenol OTC discussed alcohol sensation.  Patient verbalizes understand instructions will follow-up with her orthopedic physician Dr. Ovalles.    FINAL IMPRESSION  1.  Acute right ankle pain  2.  Degenerative joint disease right ankle  3.  Alcohol abuse         Electronically signed by: Ervin Youngblood, 7/14/2019 5:13 PM

## 2019-07-15 NOTE — DISCHARGE PLANNING
Alert Team    This writer notified by nursing staff that the patient is currently sleeping; ERP advises that this writer attempt to evaluate the patient at approximately [phone] due to agitation/intoxication. Alert Team to continue to monitor and will evaluate patient at that time.

## 2019-07-15 NOTE — ED NOTES
Pt states she has to use the rest room, pt ambulated to restroom with direct observation by this nurse, no s/s of distress noted

## 2019-07-15 NOTE — ED NOTES
"Pt pacing in room stating \"i'm withdrawing from alcohol, I need medicine\" this nurse assesed the patient and found the patient had no tremors, no s/s of distress noted  "

## 2019-07-15 NOTE — ED NOTES
"Pt took off her gown and was completely naked and was sitting in a chair in the de la tor[address] way. This nurse and another nurse attempted to educate the patient on the need to be back I her room and to put her gown back on. This patient proceeded to yell \"i'm not fucking crazy, I just want to some fucking tea\" the patient was again educated on the need to go back to her room and put the gown back on. The patient stood up and stomped back in her room and started yelling unintelligle words. Pt states \"i'm doing this to put a curse on you. Dr. Goldstein notified and came to see the patient.  "

## 2019-07-15 NOTE — ED NOTES
Pt resting in bed with eyes closed, breathing even and unlabored, no s/s of distress noted, sitter at bedside

## 2019-07-15 NOTE — DISCHARGE PLANNING
Alert Team    At this time the patient was noted as blowing a 0.224 on the breathalizer; this writer aware of pending behavioral consult; will continue to monitor and will evaluate the patient once she is under the legal alcohol limit. Nothing further to note at this time.

## 2019-08-05 ENCOUNTER — APPOINTMENT (OUTPATIENT)
Dept: RADIOLOGY | Facility: MEDICAL CENTER | Age: 59
End: 2019-08-05
Attending: EMERGENCY MEDICINE
Payer: MEDICAID

## 2019-08-05 ENCOUNTER — HOSPITAL ENCOUNTER (EMERGENCY)
Facility: MEDICAL CENTER | Age: 59
End: 2019-08-05
Attending: EMERGENCY MEDICINE
Payer: MEDICAID

## 2019-08-05 VITALS
HEIGHT: 67 IN | DIASTOLIC BLOOD PRESSURE: 77 MMHG | TEMPERATURE: 97.9 F | OXYGEN SATURATION: 98 % | WEIGHT: 167 LBS | BODY MASS INDEX: 26.21 KG/M2 | SYSTOLIC BLOOD PRESSURE: 122 MMHG | HEART RATE: 84 BPM | RESPIRATION RATE: 16 BRPM

## 2019-08-05 DIAGNOSIS — I95.9 HYPOTENSION, UNSPECIFIED HYPOTENSION TYPE: ICD-10-CM

## 2019-08-05 DIAGNOSIS — F10.921 ALCOHOL INTOXICATION WITH DELIRIUM (HCC): ICD-10-CM

## 2019-08-05 LAB
ALBUMIN SERPL BCP-MCNC: 4 G/DL (ref 3.2–4.9)
ALBUMIN/GLOB SERPL: 1.1 G/DL
ALP SERPL-CCNC: 148 U/L (ref 30–99)
ALT SERPL-CCNC: 15 U/L (ref 2–50)
ANION GAP SERPL CALC-SCNC: 11 MMOL/L (ref 0–11.9)
APPEARANCE UR: CLEAR
AST SERPL-CCNC: 33 U/L (ref 12–45)
BACTERIA #/AREA URNS HPF: NEGATIVE /HPF
BASOPHILS # BLD AUTO: 1.9 % (ref 0–1.8)
BASOPHILS # BLD: 0.13 K/UL (ref 0–0.12)
BILIRUB SERPL-MCNC: 0.2 MG/DL (ref 0.1–1.5)
BILIRUB UR QL STRIP.AUTO: NEGATIVE
BUN SERPL-MCNC: 8 MG/DL (ref 8–22)
CALCIUM SERPL-MCNC: 8.5 MG/DL (ref 8.5–10.5)
CHLORIDE SERPL-SCNC: 106 MMOL/L (ref 96–112)
CO2 SERPL-SCNC: 24 MMOL/L (ref 20–33)
COLOR UR: YELLOW
CREAT SERPL-MCNC: 0.83 MG/DL (ref 0.5–1.4)
EKG IMPRESSION: NORMAL
EOSINOPHIL # BLD AUTO: 0.08 K/UL (ref 0–0.51)
EOSINOPHIL NFR BLD: 1.1 % (ref 0–6.9)
EPI CELLS #/AREA URNS HPF: NEGATIVE /HPF
ERYTHROCYTE [DISTWIDTH] IN BLOOD BY AUTOMATED COUNT: 52.9 FL (ref 35.9–50)
ETHANOL BLD-MCNC: 0.4 G/DL
GLOBULIN SER CALC-MCNC: 3.6 G/DL (ref 1.9–3.5)
GLUCOSE SERPL-MCNC: 100 MG/DL (ref 65–99)
GLUCOSE UR STRIP.AUTO-MCNC: NEGATIVE MG/DL
HCT VFR BLD AUTO: 34.7 % (ref 37–47)
HGB BLD-MCNC: 11.2 G/DL (ref 12–16)
HYALINE CASTS #/AREA URNS LPF: ABNORMAL /LPF
IMM GRANULOCYTES # BLD AUTO: 0.03 K/UL (ref 0–0.11)
IMM GRANULOCYTES NFR BLD AUTO: 0.4 % (ref 0–0.9)
KETONES UR STRIP.AUTO-MCNC: NEGATIVE MG/DL
LACTATE BLD-SCNC: 2 MMOL/L (ref 0.5–2)
LEUKOCYTE ESTERASE UR QL STRIP.AUTO: ABNORMAL
LIPASE SERPL-CCNC: 45 U/L (ref 11–82)
LYMPHOCYTES # BLD AUTO: 2.53 K/UL (ref 1–4.8)
LYMPHOCYTES NFR BLD: 36.2 % (ref 22–41)
MCH RBC QN AUTO: 29.9 PG (ref 27–33)
MCHC RBC AUTO-ENTMCNC: 32.3 G/DL (ref 33.6–35)
MCV RBC AUTO: 92.5 FL (ref 81.4–97.8)
MICRO URNS: ABNORMAL
MONOCYTES # BLD AUTO: 0.59 K/UL (ref 0–0.85)
MONOCYTES NFR BLD AUTO: 8.5 % (ref 0–13.4)
NEUTROPHILS # BLD AUTO: 3.62 K/UL (ref 2–7.15)
NEUTROPHILS NFR BLD: 51.9 % (ref 44–72)
NITRITE UR QL STRIP.AUTO: NEGATIVE
NRBC # BLD AUTO: 0 K/UL
NRBC BLD-RTO: 0 /100 WBC
PH UR STRIP.AUTO: 6 [PH] (ref 5–8)
PLATELET # BLD AUTO: 378 K/UL (ref 164–446)
PMV BLD AUTO: 9.1 FL (ref 9–12.9)
POTASSIUM SERPL-SCNC: 3.7 MMOL/L (ref 3.6–5.5)
PROT SERPL-MCNC: 7.6 G/DL (ref 6–8.2)
PROT UR QL STRIP: NEGATIVE MG/DL
RBC # BLD AUTO: 3.75 M/UL (ref 4.2–5.4)
RBC # URNS HPF: ABNORMAL /HPF
RBC UR QL AUTO: ABNORMAL
SODIUM SERPL-SCNC: 141 MMOL/L (ref 135–145)
SP GR UR STRIP.AUTO: 1
TROPONIN T SERPL-MCNC: 10 NG/L (ref 6–19)
TROPONIN T SERPL-MCNC: 11 NG/L (ref 6–19)
UROBILINOGEN UR STRIP.AUTO-MCNC: 0.2 MG/DL
WBC # BLD AUTO: 7 K/UL (ref 4.8–10.8)
WBC #/AREA URNS HPF: ABNORMAL /HPF

## 2019-08-05 PROCEDURE — 80307 DRUG TEST PRSMV CHEM ANLYZR: CPT

## 2019-08-05 PROCEDURE — 81001 URINALYSIS AUTO W/SCOPE: CPT | Mod: XU

## 2019-08-05 PROCEDURE — 700105 HCHG RX REV CODE 258: Performed by: EMERGENCY MEDICINE

## 2019-08-05 PROCEDURE — 85025 COMPLETE CBC W/AUTO DIFF WBC: CPT

## 2019-08-05 PROCEDURE — 71045 X-RAY EXAM CHEST 1 VIEW: CPT

## 2019-08-05 PROCEDURE — 93005 ELECTROCARDIOGRAM TRACING: CPT | Performed by: EMERGENCY MEDICINE

## 2019-08-05 PROCEDURE — 83690 ASSAY OF LIPASE: CPT

## 2019-08-05 PROCEDURE — 99285 EMERGENCY DEPT VISIT HI MDM: CPT

## 2019-08-05 PROCEDURE — 84484 ASSAY OF TROPONIN QUANT: CPT

## 2019-08-05 PROCEDURE — 304561 HCHG STAT O2

## 2019-08-05 PROCEDURE — 83605 ASSAY OF LACTIC ACID: CPT

## 2019-08-05 PROCEDURE — 93005 ELECTROCARDIOGRAM TRACING: CPT

## 2019-08-05 PROCEDURE — 80053 COMPREHEN METABOLIC PANEL: CPT

## 2019-08-05 RX ORDER — SODIUM CHLORIDE 9 MG/ML
1000 INJECTION, SOLUTION INTRAVENOUS ONCE
Status: COMPLETED | OUTPATIENT
Start: 2019-08-05 | End: 2019-08-05

## 2019-08-05 RX ORDER — SODIUM CHLORIDE 9 MG/ML
1000 INJECTION, SOLUTION INTRAVENOUS ONCE
Status: DISCONTINUED | OUTPATIENT
Start: 2019-08-05 | End: 2019-08-05

## 2019-08-05 RX ADMIN — SODIUM CHLORIDE 1000 ML: 9 INJECTION, SOLUTION INTRAVENOUS at 18:15

## 2019-08-05 RX ADMIN — SODIUM CHLORIDE 1000 ML: 9 INJECTION, SOLUTION INTRAVENOUS at 15:07

## 2019-08-05 ASSESSMENT — PAIN SCALES - WONG BAKER: WONGBAKER_NUMERICALRESPONSE: HURTS A LITTLE MORE

## 2019-08-05 NOTE — ED NOTES
Pt up at bedside changing into clothes with steady movements.  Multiple RN, securtiy at bedside to help pt get up.  Pt swearing at staff   Subjective:       Patient ID: Evan Myers is a 40 y.o. male.    Chief Complaint: Other (discuss family hx of colon cancer)    Patient here with FH of Rectal cancer. His father was diagnosed at age 59. The patient has no history of any problems with his bowel function. There is no hx of colon or rectal cancer in any of his father's family members.     He denies abdominal pain, nausea or vomiting, BRBPR or melena. There has been no anorexia or weight loss. There has been change in bowel habits.       Review of Systems   Constitutional: Negative.  Negative for activity change, appetite change, chills, diaphoresis, fatigue, fever and unexpected weight change.   HENT: Positive for ear pain, sinus pressure, sinus pain and voice change. Negative for congestion, ear discharge, facial swelling, hearing loss, nosebleeds, postnasal drip, sneezing, tinnitus and trouble swallowing.    Eyes: Negative for photophobia, redness and visual disturbance.   Respiratory: Negative for cough, chest tightness, shortness of breath and wheezing.    Cardiovascular: Negative for chest pain and palpitations.   Gastrointestinal: Negative for abdominal distention, abdominal pain, blood in stool, constipation, diarrhea, nausea, rectal pain and vomiting.   Genitourinary: Negative for difficulty urinating, discharge, dysuria, flank pain, frequency, hematuria, scrotal swelling, testicular pain and urgency.   Musculoskeletal: Negative for arthralgias, back pain, gait problem, joint swelling, myalgias and neck stiffness.   Skin: Negative for color change, pallor, rash and wound.   Neurological: Positive for headaches. Negative for dizziness, tremors, seizures, syncope, facial asymmetry, speech difficulty, weakness, light-headedness and numbness.   Hematological: Negative for adenopathy. Does not bruise/bleed easily.   Psychiatric/Behavioral: Negative for agitation, confusion, hallucinations, sleep disturbance and suicidal ideas.       Objective:       Physical Exam   Constitutional: He is oriented to person, place, and time. He appears well-developed and well-nourished. No distress.   HENT:   Head: Normocephalic and atraumatic.   Nose: Nose normal.   Mouth/Throat: Oropharynx is clear and moist. No oropharyngeal exudate.   Eyes: Pupils are equal, round, and reactive to light. Conjunctivae are normal. No scleral icterus.   Neck: Normal range of motion. Neck supple. No thyromegaly present.   Cardiovascular: Normal rate and regular rhythm. Exam reveals no gallop and no friction rub.   No murmur heard.  Pulmonary/Chest: Effort normal and breath sounds normal. No respiratory distress. He has no wheezes. He has no rales.   Abdominal: Soft. Bowel sounds are normal. He exhibits no distension and no mass. There is no tenderness. There is no rebound and no guarding.   Musculoskeletal: He exhibits no edema or tenderness.   Lymphadenopathy:     He has no cervical adenopathy.   Neurological: He is alert and oriented to person, place, and time. He exhibits normal muscle tone. Coordination normal.   Skin: Skin is warm. No rash noted. He is not diaphoretic.   Psychiatric: He has a normal mood and affect. His behavior is normal. Judgment and thought content normal.   Vitals reviewed.      Assessment:   FH Colon Cancer    No diagnosis found.    Plan:   Keep date of screening at age 47.

## 2019-08-05 NOTE — ED NOTES
Spoke with pt at bedside. Pt shook her head no when ask if she took any medications.  Placed call to listed home pharmacy. No medications filled since   May 2019. All medications removed from med rec.  Home Pharmacy [facility] ( last listed) [address]

## 2019-08-05 NOTE — ED PROVIDER NOTES
"ED Provider Note    CHIEF COMPLAINT  Chief Complaint   Patient presents with   • Chest Pain       HPI  Ashleigh Marquis is a 58 y.o. female who presents to the emergency department brought in by EMS for evaluation of \"chest pain\".  The patient has a history of alcohol abuse and was intoxicated apparently she was going to senior living and in route to senior living she started point of chest pain EMS was called and she was brought here.     On arrival the patient was somnolent but easily woke up.  She complained of abdominal pain to me initially.  After some time she did complain of chest pain.  She is so intoxicated that is difficult to obtain a history from.  She denies suicidal ideation she denies coingestants.    The patient is unable to articulate the details of her chest pain or whether it radiates or any aggravating alleviating factors.    On initial assessment she is so intoxicated, I cannot obtain any additional history.      REVIEW OF SYSTEMS  See HPI for further details. All other systems are negative.    PAST MEDICAL HISTORY  Past Medical History:   Diagnosis Date   • Alcoholism (HCC)    • Anxiety    • Arthritis    • ASTHMA    • Cancer (HCC) 1981    cervical   • Chronic low back pain    • Congestive heart failure (HCC)    • Depression    • EtOH dependence (HCC)    • Fall     alcohol related   • GERD (gastroesophageal reflux disease)    • HTN    • Hypertension    • Indigestion     GERD   • Muscle disorder    • OSTEOPOROSIS    • Other specified symptom associated with female genital organs     \"I have fibroids bad\"\"   • Psychiatric disorder    • PTSD (post-traumatic stress disorder)    • Renal disorder     Hx of stones   • Seizure (HCC)     several years ago r/t alcohol withdrawl   • Ulcer    • Vitamin D deficiency        FAMILY HISTORY  Family History   Problem Relation Age of Onset   • Heart Disease Father    • Hypertension Father    • Diabetes Father    • Cancer Paternal Grandmother    • Depression Other    • Lung " Disease Neg Hx    • Stroke Neg Hx        SOCIAL HISTORY  Social History     Socioeconomic History   • Marital status:      Spouse name: Not on file   • Number of children: Not on file   • Years of education: Not on file   • Highest education level: Not on file   Occupational History   • Not on file   Social Needs   • Financial resource strain: Not on file   • Food insecurity:     Worry: Not on file     Inability: Not on file   • Transportation needs:     Medical: Not on file     Non-medical: Not on file   Tobacco Use   • Smoking status: Current Every Day Smoker     Packs/day: 0.50     Years: 20.00     Pack years: 10.00     Types: Cigarettes   • Smokeless tobacco: Never Used   • Tobacco comment: 1/2 pack per day   Substance and Sexual Activity   • Alcohol use: Yes     Comment: vodka, heavy use   • Drug use: No   • Sexual activity: Never     Partners: Male   Lifestyle   • Physical activity:     Days per week: Not on file     Minutes per session: Not on file   • Stress: Not on file   Relationships   • Social connections:     Talks on phone: Not on file     Gets together: Not on file     Attends Orthodox service: Not on file     Active member of club or organization: Not on file     Attends meetings of clubs or organizations: Not on file     Relationship status: Not on file   • Intimate partner violence:     Fear of current or ex partner: Not on file     Emotionally abused: Not on file     Physically abused: Not on file     Forced sexual activity: Not on file   Other Topics Concern   • Not on file   Social History Narrative    ** Merged History Encounter **            SURGICAL HISTORY  Past Surgical History:   Procedure Laterality Date   • GASTROSCOPY-ENDO N/A 10/3/2018    Procedure: GASTROSCOPY-ENDO;  Surgeon: Ben Negro M.D.;  Location: ENDOSCOPY [facility];  Service: Gastroenterology   • CYSTOSCOPY STENT PLACEMENT  11/9/2010    Performed by ANGEL HARRIS at SURGERY [facility]   •  "URETEROSCOPY  11/9/2010    Performed by ANGEL HARRIS at SURGERY [facility]   • LASERTRIPSY  11/9/2010    Performed by ANGEL HARRIS at SURGERY [facility] ORS   • STENT REMOVAL  11/9/2010    Performed by ANGEL HARRIS at SURGERY [facility] OR[facility]   • CYSTO STENT PLACEMNT PRE SURG  10/7/2010    Performed by ANGEL HARRIS at SURGERY [facility] ORS   • HERNIA REPAIR  1977       CURRENT MEDICATIONS  Home Medications    **Home medications have not yet been reviewed for this encounter**         ALLERGIES  Allergies   Allergen Reactions   • Sulfa Drugs Vomiting   • Toradol Vomiting   • Gabapentin      Bowel incontinence     • Amoxicillin Rash     Reported by NAIDA on arrival to ED    Pt states she takes PCN 10/3       PHYSICAL EXAM  VITAL SIGNS: BP (!) 83/44   Pulse 74   Temp 36.6 °C (97.9 °F) (Temporal)   Resp 16   Ht 1.702 m (5' 7\")   Wt 75.8 kg (167 lb)   LMP 11/02/2015   SpO2 92%   BMI 26.16 kg/m²    Constitutional: Somnolent arousable no acute court-appointed distress per  HENT: Normocephalic, Atraumatic, Bilateral external ears normal, Oropharynx moist, No oral exudates, Nose normal.   Eyes: PERRL, EOMI, Conjunctiva normal, No discharge.   Neck: Normal range of motion, No tenderness, Supple, No stridor.   Cardiovascular: Normal heart rate, Normal rhythm, No murmurs, No rubs, No gallops.   Thorax & Lungs: Normal breath sounds, No respiratory distress, No wheezing,   Abdomen: Bowel sounds normal, Soft, No tenderness,  Skin: Warm, Dry, No erythema, No rash.   Back: No tenderness, No CVA tenderness.  Musculoskeletal: Good range of motion in all major joints.  Neurologic: Alert,  No focal deficits noted.   Psychiatric: Affect unable to assess    Results for orders placed or performed during the hospital encounter of 08/05/19   Complete Metabolic Panel (CMP)   Result Value Ref Range    Sodium 141 135 - 145 mmol/L    Potassium 3.7 3.6 - 5.5 mmol/L    Chloride 106 96 - 112 mmol/L    Co2 24 20 - 33 mmol/L    " Anion Gap 11.0 0.0 - 11.9    Glucose 100 (H) 65 - 99 mg/dL    Bun 8 8 - 22 mg/dL    Creatinine 0.83 0.50 - 1.40 mg/dL    Calcium 8.5 8.5 - 10.5 mg/dL    AST(SGOT) 33 12 - 45 U/L    ALT(SGPT) 15 2 - 50 U/L    Alkaline Phosphatase 148 (H) 30 - 99 U/L    Total Bilirubin 0.2 0.1 - 1.5 mg/dL    Albumin 4.0 3.2 - 4.9 g/dL    Total Protein 7.6 6.0 - 8.2 g/dL    Globulin 3.6 (H) 1.9 - 3.5 g/dL    A-G Ratio 1.1 g/dL   Troponin   Result Value Ref Range    Troponin T 10 6 - 19 ng/L   LACTIC ACID   Result Value Ref Range    Lactic Acid 2.0 0.5 - 2.0 mmol/L   URINALYSIS CULTURE, IF INDICATED   Result Value Ref Range    Color Yellow     Character Clear     Specific Gravity 1.005 <1.035    Ph 6.0 5.0 - 8.0    Glucose Negative Negative mg/dL    Ketones Negative Negative mg/dL    Protein Negative Negative mg/dL    Bilirubin Negative Negative    Urobilinogen, Urine 0.2 Negative    Nitrite Negative Negative    Leukocyte Esterase Small (A) Negative    Occult Blood Trace (A) Negative    Micro Urine Req Microscopic    DIAGNOSTIC ALCOHOL   Result Value Ref Range    Diagnostic Alcohol 0.40 (H) 0.00 g/dL   ESTIMATED GFR   Result Value Ref Range    GFR If African American >60 >60 mL/min/1.73 m 2    GFR If Non African American >60 >60 mL/min/1.73 m 2   URINE MICROSCOPIC (W/UA)   Result Value Ref Range    WBC 5-10 (A) /hpf    RBC 0-2 /hpf    Bacteria Negative None /hpf    Epithelial Cells Negative /hpf    Hyaline Cast 0-2 /lpf   TROPONIN   Result Value Ref Range    Troponin T 11 6 - 19 ng/L   LIPASE   Result Value Ref Range    Lipase 45 11 - 82 U/L   EKG   Result Value Ref Range    Report       [facility] Emergency Dept.    Test Date:  2019  Pt Name:    LAKSHMI DARLING              Department: ER  MRN:        [id number]                      Room:       [facility]  Gender:     Female                       Technician: [id number]  :        1960                   Requested By:ER TRIAGE PROTOCOL  Order #:    [id number]                     Reading MD: ABIGAIL MELO. [facility]    Measurements  Intervals                                Axis  Rate:       64                           P:          54  NV:         166                          QRS:        6  QRSD:       110                          T:          15  QT:         412  QTc:        425    Interpretive Statements  Sinus rhythm  Abnormal R-wave progression, early transition  Compared to ECG 04/29/2019 16:37:47  Myocardial infarct finding no longer present    Electronically Signed On 8-5-2019 17:33:33 PDT by ABIAGIL MELO. [facility]          RADIOLOGY/PROCEDURES  DX-CHEST-PORTABLE (1 VIEW)   Final Result      Left basilar atelectasis.          COURSE & MEDICAL DECISION MAKING  Pertinent Labs & Imaging studies reviewed. (See chart for details)    The patient presents with intoxication.  Surprisingly she is a little bit hypotensive.  She is given IV fluids and she is responded but her blood pressures do not quite normalized.    I considered a broad differential diagnosis including sepsis but she does not have a white count source for infectious or lactic acidosis.  I do not think she requires empiric antibiotics.    She complained of chest discomfort.  Her clinical history does not suggest a dissection nor does her chest x-ray.    Urinalysis and chest x-ray do not reveal source of infection.  EKG does not show STEMI she had 2 troponins both which are negative.  I considered pancreatitis as the etiology of her epigastric chest discomfort.  This is negative.    The patient denies any falls trauma or injuries.  She does not have an anemia I do not think she is having a hemorrhage.  She denies taking an overdose.    The patient was given fluids and reassessed frequently.    Indication for IV fluids as low blood pressure, unable to take oral fluids because of altered mental status.  She is reassessed after fluids and improved.    Patient's mental status improved gradually.  Now that she is more awake she  states her chest pain has resolved.  Her blood pressure is improving but has not resolved she denies suicidal ideation or coingestants.  Because of her persistent and prolonged hypotension will be admitted for further work-up and treatment.    FINAL IMPRESSION  1. Alcohol intoxication with delirium (HCC)     2. Hypotension, unspecified hypotension type         2.   3.         Electronically signed by: Win Delgado, 8/5/2019 2:45 PM

## 2019-08-05 NOTE — ED TRIAGE NOTES
"Pt arrived via EMS. Pt was found with positive ETOH by [place] police. Patient complains of medial chest pain, so EMS called. Patient is incoherant, unable to form full sentences. Patient is able to open her eyes with verbal stimuli and responds with minimal answers. Patient states \"I hurt\" and points to her chest.   Respirations are even and unlabored.   Patient noted to be hypotensive with low oxygen saturations. Fluid bolus started and patient placed on 4L nasal cannula.   "

## 2019-08-06 ENCOUNTER — HOSPITAL ENCOUNTER (EMERGENCY)
Facility: MEDICAL CENTER | Age: 59
End: 2019-08-06
Attending: EMERGENCY MEDICINE
Payer: MEDICAID

## 2019-08-06 ENCOUNTER — HOSPITAL ENCOUNTER (EMERGENCY)
Dept: HOSPITAL 8 - ED | Age: 59
Discharge: HOME | End: 2019-08-06
Payer: COMMERCIAL

## 2019-08-06 VITALS — SYSTOLIC BLOOD PRESSURE: 107 MMHG | DIASTOLIC BLOOD PRESSURE: 57 MMHG

## 2019-08-06 VITALS
OXYGEN SATURATION: 99 % | RESPIRATION RATE: 16 BRPM | BODY MASS INDEX: 26.64 KG/M2 | DIASTOLIC BLOOD PRESSURE: 64 MMHG | WEIGHT: 169.75 LBS | SYSTOLIC BLOOD PRESSURE: 106 MMHG | HEIGHT: 67 IN | HEART RATE: 78 BPM | TEMPERATURE: 98.5 F

## 2019-08-06 VITALS — HEIGHT: 63 IN | WEIGHT: 171.08 LBS | BODY MASS INDEX: 30.31 KG/M2

## 2019-08-06 VITALS — WEIGHT: 293 LBS | BODY MASS INDEX: 50.02 KG/M2 | HEIGHT: 64 IN

## 2019-08-06 VITALS — DIASTOLIC BLOOD PRESSURE: 58 MMHG | SYSTOLIC BLOOD PRESSURE: 114 MMHG

## 2019-08-06 DIAGNOSIS — F10.120: Primary | ICD-10-CM

## 2019-08-06 DIAGNOSIS — Z72.9: ICD-10-CM

## 2019-08-06 DIAGNOSIS — F10.129: Primary | ICD-10-CM

## 2019-08-06 DIAGNOSIS — F32.9: ICD-10-CM

## 2019-08-06 DIAGNOSIS — F17.200: ICD-10-CM

## 2019-08-06 DIAGNOSIS — I10: ICD-10-CM

## 2019-08-06 DIAGNOSIS — E11.9: ICD-10-CM

## 2019-08-06 DIAGNOSIS — F10.10 ALCOHOL ABUSE: ICD-10-CM
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
## 2019-08-06 DIAGNOSIS — K21.9: ICD-10-CM

## 2019-08-06 DIAGNOSIS — F43.10: ICD-10-CM

## 2019-08-06 LAB
ALBUMIN SERPL-MCNC: 3.3 G/DL (ref 3.4–5)
AMPHET UR QL SCN: NEGATIVE
ANION GAP SERPL CALC-SCNC: 9 MMOL/L (ref 5–15)
BARBITURATES UR QL SCN: NEGATIVE
BASOPHILS # BLD AUTO: 0.09 X10^3/UL (ref 0–0.1)
BASOPHILS NFR BLD AUTO: 1 % (ref 0–1)
BENZODIAZ UR QL SCN: NEGATIVE
BZE UR QL SCN: NEGATIVE
CALCIUM SERPL-MCNC: 7.9 MG/DL (ref 8.5–10.1)
CANNABINOIDS UR QL SCN: NEGATIVE
CHLORIDE SERPL-SCNC: 112 MMOL/L (ref 98–107)
CREAT SERPL-MCNC: 0.64 MG/DL (ref 0.55–1.02)
EOSINOPHIL # BLD AUTO: 0.1 X10^3/UL (ref 0–0.4)
EOSINOPHIL NFR BLD AUTO: 1 % (ref 1–7)
ERYTHROCYTE [DISTWIDTH] IN BLOOD BY AUTOMATED COUNT: 16.9 % (ref 9.6–15.2)
ETHANOL BLD-MCNC: 0.41 G/DL
LYMPHOCYTES # BLD AUTO: 2.51 X10^3/UL (ref 1–3.4)
LYMPHOCYTES NFR BLD AUTO: 34 % (ref 22–44)
MCH RBC QN AUTO: 29.5 PG (ref 27–34.8)
MCHC RBC AUTO-ENTMCNC: 32.5 G/DL (ref 32.4–35.8)
MCV RBC AUTO: 90.9 FL (ref 80–100)
MD: NO
METHADONE UR QL SCN: NEGATIVE
MONOCYTES # BLD AUTO: 0.53 X10^3/UL (ref 0.2–0.8)
MONOCYTES NFR BLD AUTO: 7 % (ref 2–9)
NEUTROPHILS # BLD AUTO: 4.22 X10^3/UL (ref 1.8–6.8)
NEUTROPHILS NFR BLD AUTO: 57 % (ref 42–75)
OPIATES UR QL SCN: NEGATIVE
OXYCODONE UR QL SCN: NEGATIVE
PCP UR QL SCN: NEGATIVE
PLATELET # BLD AUTO: 368 X10^3/UL (ref 130–400)
PMV BLD AUTO: 7.3 FL (ref 7.4–10.4)
PROPOXYPH UR QL SCN: NEGATIVE
RBC # BLD AUTO: 3.73 X10^6/UL (ref 3.82–5.3)
VANCOMYCIN TROUGH SERPL-MCNC: < 1.7 MG/DL (ref 2.8–20)

## 2019-08-06 PROCEDURE — 96372 THER/PROPH/DIAG INJ SC/IM: CPT

## 2019-08-06 PROCEDURE — 85025 COMPLETE CBC W/AUTO DIFF WBC: CPT

## 2019-08-06 PROCEDURE — 36415 COLL VENOUS BLD VENIPUNCTURE: CPT

## 2019-08-06 PROCEDURE — 80156 ASSAY CARBAMAZEPINE TOTAL: CPT

## 2019-08-06 PROCEDURE — 99285 EMERGENCY DEPT VISIT HI MDM: CPT

## 2019-08-06 PROCEDURE — 304561 HCHG STAT O2

## 2019-08-06 PROCEDURE — 99284 EMERGENCY DEPT VISIT MOD MDM: CPT

## 2019-08-06 PROCEDURE — 80307 DRUG TEST PRSMV CHEM ANLYZR: CPT

## 2019-08-06 PROCEDURE — 82040 ASSAY OF SERUM ALBUMIN: CPT

## 2019-08-06 PROCEDURE — 93005 ELECTROCARDIOGRAM TRACING: CPT

## 2019-08-06 PROCEDURE — 80048 BASIC METABOLIC PNL TOTAL CA: CPT

## 2019-08-06 PROCEDURE — 99283 EMERGENCY DEPT VISIT LOW MDM: CPT

## 2019-08-06 ASSESSMENT — LIFESTYLE VARIABLES: DO YOU DRINK ALCOHOL: YES

## 2019-08-06 NOTE — ED NOTES
Report given to next shift RN to resume care of patient.     Patient is now awake, oriented x3. Patient noted to be out of bed. Patient reoriented and placed back into bed.

## 2019-08-06 NOTE — ED NOTES
Patient resting in bed, sleeping, no signs of pain or distress, unlabored breathing noted, attached to cardiac monitor, bed in low position, call light within reach.    Emergency room provider at bedside assessing patient, on 2 liters of oxygen via nasal cannula.

## 2019-08-06 NOTE — ED NOTES
Patient resting in bed, sleeping, no signs of pain or distress, unlabored breathing noted, attached to cardiac monitor, bed in low position, call light within reach, on 2 liters of oxygen, frequent rounding performed, will continue to assess patient, IV saline locked.

## 2019-08-06 NOTE — ED NOTES
Patient resting in bed, sleeping, no signs of pain or distress, unlabored breathing noted, attached to cardiac monitor, bed in low position, call light within reach, frequent rounding performed, will continue to assess patient.

## 2019-08-06 NOTE — ED NOTES
Assumed care of pt, pt walking around room, re-directed back to bed, pt then again took off all monitors and walking around room saying I want to leave, inpt provider at bedside and aware.

## 2019-08-06 NOTE — ED NOTES
Patient resting in bed, sleeping, no signs of pain or distress, unlabored breathing noted, attached to cardiac monitor, bed in low position, call light within reach, on 2 liters of oxygen via nasal cannula.

## 2019-08-06 NOTE — ED NOTES
inpt provider at bedside. Pt wanting to leave AMA, all risks discussed with pt.. Pt escorted out by security.steady gait. PIV removed.

## 2019-08-06 NOTE — ED TRIAGE NOTES
Chief Complaint   Patient presents with   • ETOH Withdrawal     1-2 bottles of vodka intake prior to arrival, information per EMS

## 2019-08-06 NOTE — ED NOTES
Pt ambulatory with steady gait. Pt given meal box and a glass of water. Denies any other needs at this time. ERP Dr. Winn notified, stated that pt could be discharged.

## 2019-08-06 NOTE — ED PROVIDER NOTES
ED Provider Note   8/6/2019  4:37 AM    Means of Arrival: EMS  History obtained by: EMS  Limitations: Intoxication    CHIEF COMPLAINT  Chief Complaint   Patient presents with   • ETOH Withdrawal     1-2 bottles of vodka intake prior to arrival, information per EMS       HPI  Ashleigh Marquis is a 58 y.o. female with well-known history of alcohol dependence, frequent visits for alcohol intoxication.  She is brought via EMS for alcohol intoxication.  She is found to empty bottles of vodka.  She is unable to provide any history at this time.  She is arousable to painful stimulus but goes right back to sleep.  Apparently demonstrated mild hypoxia in the high 80s on arrival.  She is been placed on 2 L nasal cannula.  No known trauma.    REVIEW OF SYSTEMS  Review of Systems   Unable to perform ROS: Mental status change     See HPI for further details.     PAST MEDICAL HISTORY   has a past medical history of Alcoholism ([facility]), Anxiety, Arthritis, ASTHMA, Cancer (HCC) (1981), Chronic low back pain, Congestive heart failure ([facility]), Depression, EtOH dependence ([facility]), Fall, GERD (gastroesophageal reflux disease), HTN, Hypertension, Indigestion, Muscle disorder, OSTEOPOROSIS, Other specified symptom associated with female genital organs, Psychiatric disorder, PTSD (post-traumatic stress disorder), Renal disorder, Seizure ([facility]), Ulcer, and Vitamin D deficiency.    SOCIAL HISTORY  Social History     Tobacco Use   • Smoking status: Current Every Day Smoker     Packs/day: 0.50     Years: 20.00     Pack years: 10.00     Types: Cigarettes   • Smokeless tobacco: Never Used   • Tobacco comment: 1/2 pack per day   Substance and Sexual Activity   • Alcohol use: Yes     Comment: vodka, heavy use   • Drug use: No   • Sexual activity: Never     Partners: Male       SURGICAL HISTORY   has a past surgical history that includes hernia repair (1977); cysto stent placemnt pre surg (10/7/2010); cystoscopy stent placement (11/9/2010);  "ureteroscopy (11/9/2010); lasertripsy (11/9/2010); stent removal (11/9/2010); and gastroscopy-endo (N/A, 10/3/2018).    CURRENT MEDICATIONS  Home Medications     Reviewed by Carrington Teran R.N. (Registered Nurse) on 08/06/19 at 0415  Med List Status: Partial   Medication Last Dose Status        Patient Tyron Taking any Medications                       ALLERGIES  Allergies   Allergen Reactions   • Sulfa Drugs Vomiting   • Toradol Vomiting   • Gabapentin      Bowel incontinence     • Amoxicillin Rash     Reported by NAIDA on arrival to ED    Pt states she takes PCN 10/3       PHYSICAL EXAM  VITAL SIGNS: /72   Pulse 76   Temp 36.9 °C (98.5 °F) (Temporal)   Resp 16   Ht 1.702 m (5' 7\")   Wt 77 kg (169 lb 12.1 oz)   LMP 11/02/2015   SpO2 99%   BMI 26.59 kg/m²    Pulse ox interpretation: Abnormal pulse ox, now normal on 2 L nasal cannula.  Constitutional: Laying in right lateral decubitus position, smells of alcohol, arousable to painful stimulus, not following commands  HENT: Normocephalic, Atraumatic, Bilateral external ears normal. Nose normal.   Eyes: Pupils are equal. Conjunctiva normal, non-icteric.   Heart: Regular rate and rhythm, no murmurs.    Lungs: No respiratory distress, regular respirations. Clear to auscultation bilaterally.  Abdomen: Obese nondistended, nontender.  Skin: Warm, Dry, No erythema, No rash.   Neurologic: Alert, Grossly non-focal. No slurred speech. Moving extremities normally.   MSK: I was able to move her extremities, no signs of injury  Psychiatric: Unable to assess  Physical Exam      COURSE & MEDICAL DECISION MAKING  Pertinent Labs & Imaging studies reviewed. (See chart for details)    4:37 AM This is an emergent evaluation of a 58 y.o., female with well-documented history of alcohol abuse, frequent visits for alcohol intoxication.  Currently appears very intoxicated, difficult to arouse.  Requires painful stimulus which she localizes to that stimulus.  Her she was right " back to sleep.  Not following commands or answering any questions.  She is been placed on 2 L nasal cannula for mild hypoxia.  I believe this is is due to severe alcohol intoxication.  She is protecting her airway at this time.  Will check blood alcohol level and continue to monitor with supportive care.    8:32 AM  She is more alert now.  Blood pressure improved.  Her alcohol level is 0.41.  Urine drug screen negative.  I believe she will require additional time to metabolize to the point where she can ambulate on her own and is more oriented.  Dr. Winn has kindly agreed to follow-up with her and determine ultimate disposition.      FINAL IMPRESSION  1. Alcohol abuse               Electronically signed by: Stefan Villafana II, 8/6/2019 4:37 AM

## 2019-08-07 ENCOUNTER — HOSPITAL ENCOUNTER (EMERGENCY)
Facility: MEDICAL CENTER | Age: 59
End: 2019-08-08
Attending: EMERGENCY MEDICINE
Payer: MEDICAID

## 2019-08-07 ENCOUNTER — HOSPITAL ENCOUNTER (EMERGENCY)
Dept: HOSPITAL 8 - ED | Age: 59
Discharge: HOME | End: 2019-08-07
Payer: MEDICAID

## 2019-08-07 ENCOUNTER — HOSPITAL ENCOUNTER (EMERGENCY)
Dept: HOSPITAL 8 - ED | Age: 59
Discharge: HOME | End: 2019-08-07
Payer: COMMERCIAL

## 2019-08-07 VITALS — SYSTOLIC BLOOD PRESSURE: 132 MMHG | DIASTOLIC BLOOD PRESSURE: 81 MMHG

## 2019-08-07 VITALS — BODY MASS INDEX: 28.6 KG/M2 | HEIGHT: 64 IN | WEIGHT: 167.55 LBS

## 2019-08-07 VITALS — SYSTOLIC BLOOD PRESSURE: 101 MMHG | DIASTOLIC BLOOD PRESSURE: 62 MMHG

## 2019-08-07 VITALS — HEIGHT: 64 IN | BODY MASS INDEX: 32.5 KG/M2 | WEIGHT: 190.39 LBS

## 2019-08-07 DIAGNOSIS — F10.920 ACUTE ALCOHOLIC INTOXICATION WITHOUT COMPLICATION (HCC): ICD-10-CM

## 2019-08-07 DIAGNOSIS — E11.9: ICD-10-CM

## 2019-08-07 DIAGNOSIS — F41.1: ICD-10-CM

## 2019-08-07 DIAGNOSIS — F43.10: ICD-10-CM

## 2019-08-07 DIAGNOSIS — F32.9: ICD-10-CM

## 2019-08-07 DIAGNOSIS — I10: ICD-10-CM

## 2019-08-07 DIAGNOSIS — F10.120: ICD-10-CM

## 2019-08-07 DIAGNOSIS — S82.891A: Primary | ICD-10-CM

## 2019-08-07 DIAGNOSIS — K21.9: ICD-10-CM
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
## 2019-08-07 DIAGNOSIS — F10.220: Primary | ICD-10-CM

## 2019-08-07 DIAGNOSIS — R11.0 NAUSEA: ICD-10-CM
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
## 2019-08-07 LAB — POC BREATHALIZER: 0.29 PERCENT (ref 0–0.01)

## 2019-08-07 PROCEDURE — 99285 EMERGENCY DEPT VISIT HI MDM: CPT

## 2019-08-07 PROCEDURE — 700111 HCHG RX REV CODE 636 W/ 250 OVERRIDE (IP): Performed by: EMERGENCY MEDICINE

## 2019-08-07 PROCEDURE — 302970 POC BREATHALIZER: Performed by: EMERGENCY MEDICINE

## 2019-08-07 PROCEDURE — 99283 EMERGENCY DEPT VISIT LOW MDM: CPT

## 2019-08-07 RX ORDER — ONDANSETRON 4 MG/1
4 TABLET, ORALLY DISINTEGRATING ORAL ONCE
Status: COMPLETED | OUTPATIENT
Start: 2019-08-07 | End: 2019-08-07

## 2019-08-07 RX ADMIN — ONDANSETRON 4 MG: 4 TABLET, ORALLY DISINTEGRATING ORAL at 22:42

## 2019-08-08 ENCOUNTER — HOSPITAL ENCOUNTER (EMERGENCY)
Facility: MEDICAL CENTER | Age: 59
End: 2019-08-08
Attending: EMERGENCY MEDICINE
Payer: MEDICAID

## 2019-08-08 VITALS
SYSTOLIC BLOOD PRESSURE: 114 MMHG | DIASTOLIC BLOOD PRESSURE: 66 MMHG | HEIGHT: 64 IN | HEART RATE: 91 BPM | RESPIRATION RATE: 18 BRPM | WEIGHT: 170 LBS | TEMPERATURE: 98.1 F | BODY MASS INDEX: 29.02 KG/M2

## 2019-08-08 VITALS
BODY MASS INDEX: 26.64 KG/M2 | RESPIRATION RATE: 16 BRPM | DIASTOLIC BLOOD PRESSURE: 78 MMHG | HEIGHT: 67 IN | WEIGHT: 169.75 LBS | SYSTOLIC BLOOD PRESSURE: 129 MMHG | HEART RATE: 99 BPM | OXYGEN SATURATION: 94 % | TEMPERATURE: 97.7 F

## 2019-08-08 DIAGNOSIS — F10.920 ALCOHOLIC INTOXICATION WITHOUT COMPLICATION (HCC): ICD-10-CM

## 2019-08-08 PROCEDURE — 99284 EMERGENCY DEPT VISIT MOD MDM: CPT

## 2019-08-08 ASSESSMENT — LIFESTYLE VARIABLES
DOES PATIENT WANT TO STOP DRINKING: CANNOT ASSESS
DO YOU DRINK ALCOHOL: YES

## 2019-08-08 NOTE — ED NOTES
Pt sleeping comfortably in bed, breathing is easy and unlabored, no s/s of distress noted. Sitter at bedside. Will continue to monitor.

## 2019-08-08 NOTE — ED TRIAGE NOTES
Chief Complaint   Patient presents with   • Headache     BIB for patient found [place] lying on the ground.  1-2 pints of ETOH PTA per patient, now has c/o headache.

## 2019-08-08 NOTE — ED PROVIDER NOTES
"ED Provider Note    CHIEF COMPLAINT  Chief Complaint   Patient presents with   • Headache     BIB for patient found downtown lying on the ground.  1-2 pints of ETOH PTA per patient, now has c/o headache.         HPI  Ashleigh Marquis is a 58 y.o. female who presents for evaluation of reported headache.  The patient was noted to be intoxicated by paramedics.  She was too intoxicated to ambulate.  She provides a an unclear history as to whether or not she struck her head.  She does report mild headache.  Patient is disheveled smells of alcohol on arrival she has no active complaints    REVIEW OF SYSTEMS  See HPI for further details.  Limited review of systems all other systems are negative.     PAST MEDICAL HISTORY  Past Medical History:   Diagnosis Date   • Cancer (HCC) 1981    cervical   • Alcoholism (HCC)    • Anxiety    • Arthritis    • ASTHMA    • Chronic low back pain    • Congestive heart failure (HCC)    • Depression    • EtOH dependence (HCC)    • Fall     alcohol related   • GERD (gastroesophageal reflux disease)    • HTN    • Hypertension    • Indigestion     GERD   • Muscle disorder    • OSTEOPOROSIS    • Other specified symptom associated with female genital organs     \"I have fibroids bad\"\"   • Psychiatric disorder    • PTSD (post-traumatic stress disorder)    • Renal disorder     Hx of stones   • Seizure (HCC)     several years ago r/t alcohol withdrawl   • Ulcer    • Vitamin D deficiency        FAMILY HISTORY  Noncontributory    SOCIAL HISTORY  Social History     Socioeconomic History   • Marital status:      Spouse name: Not on file   • Number of children: Not on file   • Years of education: Not on file   • Highest education level: Not on file   Occupational History   • Not on file   Social Needs   • Financial resource strain: Not on file   • Food insecurity:     Worry: Not on file     Inability: Not on file   • Transportation needs:     Medical: Not on file     Non-medical: Not on file "   Tobacco Use   • Smoking status: Current Every Day Smoker     Packs/day: 0.50     Years: 20.00     Pack years: 10.00     Types: Cigarettes   • Smokeless tobacco: Never Used   • Tobacco comment: 1/2 pack per day   Substance and Sexual Activity   • Alcohol use: Yes     Comment: vodka, heavy use   • Drug use: No   • Sexual activity: Never     Partners: Male   Lifestyle   • Physical activity:     Days per week: Not on file     Minutes per session: Not on file   • Stress: Not on file   Relationships   • Social connections:     Talks on phone: Not on file     Gets together: Not on file     Attends Spiritism service: Not on file     Active member of club or organization: Not on file     Attends meetings of clubs or organizations: Not on file     Relationship status: Not on file   • Intimate partner violence:     Fear of current or ex partner: Not on file     Emotionally abused: Not on file     Physically abused: Not on file     Forced sexual activity: Not on file   Other Topics Concern   • Not on file   Social History Narrative    ** Merged History Encounter **            SURGICAL HISTORY  Past Surgical History:   Procedure Laterality Date   • GASTROSCOPY-ENDO N/A 10/3/2018    Procedure: GASTROSCOPY-ENDO;  Surgeon: Ben Negro M.D.;  Location: ENDOSCOPY [facility];  Service: Gastroenterology   • CYSTOSCOPY STENT PLACEMENT  11/9/2010    Performed by ANGEL HARRIS at [facility]   • URETEROSCOPY  11/9/2010    Performed by ANGEL HARRIS at [facility]   • LASERTRIPSY  11/9/2010    Performed by ANGEL HARRIS at [facility]   • STENT REMOVAL  11/9/2010    Performed by ANGEL HARRIS at [facility]   • CYSTO STENT PLACEMNT PRE SURG  10/7/2010    Performed by ANGEL HARRIS at [facility]   • HERNIA REPAIR  1977       CURRENT MEDICATIONS  Home Medications     Reviewed by Lis Burger R.N. (Registered Nurse) on 08/08/19 at 1027  Med List Status: Not  "Addressed   Medication Last Dose Status        Patient Tyron Taking any Medications                       ALLERGIES  Allergies   Allergen Reactions   • Sulfa Drugs Vomiting   • Toradol Vomiting   • Gabapentin      Bowel incontinence     • Amoxicillin Rash     Reported by NAIDA on arrival to ED    Pt states she takes PCN 10/3       PHYSICAL EXAM  VITAL SIGNS: /63   Pulse 87   Temp 36.3 °C (97.3 °F) (Temporal)   Resp 18   Ht 1.626 m (5' 4\")   Wt 77.1 kg (170 lb)   LMP 11/02/2015   BMI 29.18 kg/m²  Room air O2: 97    Constitutional: Disheveled smells of alcohol  HENT: Normocephalic, Atraumatic, Bilateral external ears normal, Oropharynx moist, No oral exudates, Nose normal.  No hemotympanum negative monaco sign pupils equal round and reactive no suggestion of trauma above the clavicles  Eyes: PERRLA, EOMI, Conjunctiva normal, No discharge.   Neck: Normal range of motion, No tenderness, Supple, No stridor.   Cardiovascular: Normal heart rate, Normal rhythm, No murmurs, No rubs, No gallops.   Thorax & Lungs: Normal breath sounds, No respiratory distress, No wheezing, No chest tenderness.   Abdomen: Bowel sounds normal, Soft, No tenderness, No masses, No pulsatile masses.   Skin: Warm, Dry, No erythema, No rash.   Back: No tenderness, No CVA tenderness.   Extremities: Intact distal pulses, No edema, No tenderness, No cyanosis, No clubbing.   Neurologic: Alert & oriented x 3, Normal motor function, Normal sensory function, No focal deficits noted.   Psychiatric: Anxious    COURSE & MEDICAL DECISION MAKING  Pertinent Labs & Imaging studies reviewed. (See chart for details)  Patient was observed for several hours.  The time of discharge she was alert and oriented, was ambulatory without ataxia.  She denied any head injury and reported having headache from being hung over.  I feel that she can be discharged into the care of her own self.    FINAL IMPRESSION  1.  Cephalgia  2.  Alcohol intoxication   "       Electronically signed by: Win Hollingsworth, 8/8/2019 10:45 AM

## 2019-08-08 NOTE — ED PROVIDER NOTES
ED Provider Note    HPI: Patient is a 58-year-old female who presented to the emergency department by ambulance transfer August 7, 2019 at 10:08 PM with a chief complaint of altered level consciousness    This is the 19th visit to the emergency department this year for alcohol related complaints.  Patient was brought in by paramedics after being found stumbling around [facility].  She is felt to be too drunk to take for protective custody by police and so she was brought here.  Patient says she feels nauseated.  She was somewhat difficult interview she was quite somnolent.  As best as I can determine no further somatic complaints.    Review of Systems: Cannot be obtained due to presenting condition    Past medical/surgical history: Severe alcohol abuse cervical cancer anxiety arthritis asthma chronic pain depression falls related to alcohol abuse hypertension indigestion PTSD kidney stone seizure due to alcohol withdrawal    Medications: Patient is supposed to be taking Tegretol; Prozac lisinopril Prilosec but of uncertain of compliance    Allergies: Sulfa Toradol gabapentin amoxicillin    Social History: Heavy use of alcohol smokes 1/2 pack of cigarettes per day      Physical exam: Constitutional: Somewhat disheveled female sleepy but arousable  Vital signs: Temperature 98.9 pulse 106 respirations 18 blood pressure 145/98 pulse oximetry 94%  EYES: PERRL, EOMI, Conjunctivae and sclera normal, eyelids normal bilaterally.  Neck: Trachea midline. No cervical masses seen or palpated. Normal range of motion, supple. No meningeal signs elicited.  Cardiac: Regular rate and rhythm. S1-S2 present. No S3 or S4 present. No murmurs, rubs, or gallops heard. No edema or varicosities were seen.   Lungs: Clear to auscultation with good aeration throughout. No wheezes, rales, or rhonchi heard. Patient's chest wall moved symmetrically with each respiratory effort. Patient was not making use of accessory muscles of respiration in  breathing.  Abdomen: Soft nontender to palpation. No rebound or guarding elicited. No organomegaly identified. No pulsatile abdominal masses identified.   Musculoskeletal:  no  pain with palpitation or movement of muscle, bone or joint , no obvious musculoskeletal deformities identified.  Neurologic: Sleepy but arousable speech is slurred.  Moves all four extremities independently, no gross focal abnormalities identified. Normal strength and motor.  Skin: no rash or lesion seen, no palpable dermatologic lesions identified.  Psychiatric: not anxious, delusional, or hallucinating.    Medical decision making: Patient given 4 mg of Zofran p.o.    Patient noted to be somewhat hypertensive on arrival.  She has a previous history of hypertension and will follow up with her primary care provider for recheck    Breathalyzer obtained, 0.289 on a relatively weak effort    Patient appears to be severely intoxicated.  I will endorse this patient over to 1 of my partners who will append a dictation once the patient is sober.  Anticipate she should be able to go home after she is sober.  She appears to be reasonably stable at this time and no obvious evidence of an acute medical problem except for alcohol intoxication is identified    Impression 1) acute alcohol intoxication  2) seizure nausea    Addendum: Patient attempted to get out of bed repeatedly.  She was unable to ambulate and was quite ataxic.  As the patient would not cooperate with nursing staff she was placed in two-point restraints for patient and staff safety.

## 2019-08-08 NOTE — ED NOTES
"Pt aox4, steady gait on ambulation, pt refused vital signs, resp equal unlabored, given discharge instructions, pt gave papers back and stated \"I know, no drinking blah argelia stout, I am an expert.\" pt found bus pass in purse she is using to take home. Pt understands to return to ED for worsening symptoms and follow up with pcp.  "
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
## 2019-08-08 NOTE — ED NOTES
Pt sleeping comfortably in bed, breathing easy and unlabored. Pt has sitter at bedside. Will continue to monitor.

## 2019-08-15 ENCOUNTER — HOSPITAL ENCOUNTER (EMERGENCY)
Dept: HOSPITAL 8 - ED | Age: 59
Discharge: HOME | End: 2019-08-15
Payer: MEDICAID

## 2019-08-15 VITALS — DIASTOLIC BLOOD PRESSURE: 72 MMHG | SYSTOLIC BLOOD PRESSURE: 108 MMHG

## 2019-08-15 VITALS — HEIGHT: 67 IN | WEIGHT: 167.77 LBS | BODY MASS INDEX: 26.33 KG/M2

## 2019-08-15 DIAGNOSIS — Z63.8: ICD-10-CM

## 2019-08-15 DIAGNOSIS — E11.9: ICD-10-CM

## 2019-08-15 DIAGNOSIS — F43.10: ICD-10-CM

## 2019-08-15 DIAGNOSIS — F17.200: ICD-10-CM

## 2019-08-15 DIAGNOSIS — Z75.9: ICD-10-CM

## 2019-08-15 DIAGNOSIS — Z91.14: ICD-10-CM

## 2019-08-15 DIAGNOSIS — Z72.9: ICD-10-CM

## 2019-08-15 DIAGNOSIS — I10: ICD-10-CM

## 2019-08-15 DIAGNOSIS — F10.220: Primary | ICD-10-CM
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
## 2019-08-15 DIAGNOSIS — F32.9: ICD-10-CM

## 2019-08-15 PROCEDURE — 99283 EMERGENCY DEPT VISIT LOW MDM: CPT

## 2019-08-15 SDOH — SOCIAL STABILITY - SOCIAL INSECURITY: OTHER SPECIFIED PROBLEMS RELATED TO PRIMARY SUPPORT GROUP: Z63.8

## 2019-08-16 ENCOUNTER — HOSPITAL ENCOUNTER (EMERGENCY)
Facility: MEDICAL CENTER | Age: 59
End: 2019-08-16
Attending: EMERGENCY MEDICINE
Payer: MEDICAID

## 2019-08-16 ENCOUNTER — HOSPITAL ENCOUNTER (INPATIENT)
Dept: HOSPITAL 8 - ED | Age: 59
LOS: 3 days | Discharge: HOME | DRG: 917 | End: 2019-08-19
Attending: HOSPITALIST | Admitting: HOSPITALIST
Payer: MEDICAID

## 2019-08-16 VITALS
DIASTOLIC BLOOD PRESSURE: 57 MMHG | HEIGHT: 66 IN | BODY MASS INDEX: 26.93 KG/M2 | HEART RATE: 82 BPM | SYSTOLIC BLOOD PRESSURE: 91 MMHG | RESPIRATION RATE: 16 BRPM | OXYGEN SATURATION: 93 % | WEIGHT: 167.55 LBS | TEMPERATURE: 96.8 F

## 2019-08-16 VITALS — BODY MASS INDEX: 29.72 KG/M2 | HEIGHT: 65 IN | WEIGHT: 178.35 LBS

## 2019-08-16 DIAGNOSIS — E87.6: ICD-10-CM

## 2019-08-16 DIAGNOSIS — Z88.2: ICD-10-CM

## 2019-08-16 DIAGNOSIS — E87.2: ICD-10-CM

## 2019-08-16 DIAGNOSIS — I11.9: ICD-10-CM

## 2019-08-16 DIAGNOSIS — J96.01: ICD-10-CM

## 2019-08-16 DIAGNOSIS — Z88.9: ICD-10-CM

## 2019-08-16 DIAGNOSIS — Z88.6: ICD-10-CM

## 2019-08-16 DIAGNOSIS — Z91.14: ICD-10-CM

## 2019-08-16 DIAGNOSIS — F10.920 ALCOHOLIC INTOXICATION WITHOUT COMPLICATION (HCC): ICD-10-CM

## 2019-08-16 DIAGNOSIS — K21.9: ICD-10-CM

## 2019-08-16 DIAGNOSIS — F13.90: ICD-10-CM

## 2019-08-16 DIAGNOSIS — F41.1: ICD-10-CM
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
## 2019-08-16 DIAGNOSIS — F32.9: ICD-10-CM

## 2019-08-16 DIAGNOSIS — G92: ICD-10-CM

## 2019-08-16 DIAGNOSIS — E11.9: ICD-10-CM

## 2019-08-16 DIAGNOSIS — F41.0: ICD-10-CM

## 2019-08-16 DIAGNOSIS — E72.20: ICD-10-CM

## 2019-08-16 DIAGNOSIS — J69.0: ICD-10-CM

## 2019-08-16 DIAGNOSIS — Z82.49: ICD-10-CM

## 2019-08-16 DIAGNOSIS — E83.51: ICD-10-CM
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
## 2019-08-16 DIAGNOSIS — F17.200: ICD-10-CM

## 2019-08-16 DIAGNOSIS — F10.229: ICD-10-CM

## 2019-08-16 DIAGNOSIS — Z83.3: ICD-10-CM

## 2019-08-16 DIAGNOSIS — F43.10: ICD-10-CM

## 2019-08-16 DIAGNOSIS — D64.9: ICD-10-CM

## 2019-08-16 DIAGNOSIS — E83.42: ICD-10-CM

## 2019-08-16 DIAGNOSIS — Z63.8: ICD-10-CM

## 2019-08-16 DIAGNOSIS — T42.4X2A: Primary | ICD-10-CM

## 2019-08-16 DIAGNOSIS — Z99.11: ICD-10-CM

## 2019-08-16 DIAGNOSIS — I25.10: ICD-10-CM

## 2019-08-16 DIAGNOSIS — Z91.5: ICD-10-CM

## 2019-08-16 DIAGNOSIS — E87.8: ICD-10-CM

## 2019-08-16 DIAGNOSIS — Z87.442: ICD-10-CM

## 2019-08-16 PROCEDURE — 83605 ASSAY OF LACTIC ACID: CPT

## 2019-08-16 PROCEDURE — 0BH17EZ INSERTION OF ENDOTRACHEAL AIRWAY INTO TRACHEA, VIA NATURAL OR ARTIFICIAL OPENING: ICD-10-PCS | Performed by: HOSPITALIST

## 2019-08-16 PROCEDURE — 71045 X-RAY EXAM CHEST 1 VIEW: CPT

## 2019-08-16 PROCEDURE — 82140 ASSAY OF AMMONIA: CPT

## 2019-08-16 PROCEDURE — 36415 COLL VENOUS BLD VENIPUNCTURE: CPT

## 2019-08-16 PROCEDURE — 81025 URINE PREGNANCY TEST: CPT

## 2019-08-16 PROCEDURE — 80048 BASIC METABOLIC PNL TOTAL CA: CPT

## 2019-08-16 PROCEDURE — 94640 AIRWAY INHALATION TREATMENT: CPT

## 2019-08-16 PROCEDURE — 99291 CRITICAL CARE FIRST HOUR: CPT

## 2019-08-16 PROCEDURE — 80053 COMPREHEN METABOLIC PANEL: CPT

## 2019-08-16 PROCEDURE — 5A1935Z RESPIRATORY VENTILATION, LESS THAN 24 CONSECUTIVE HOURS: ICD-10-PCS | Performed by: HOSPITALIST

## 2019-08-16 PROCEDURE — 87070 CULTURE OTHR SPECIMN AEROBIC: CPT

## 2019-08-16 PROCEDURE — 87081 CULTURE SCREEN ONLY: CPT

## 2019-08-16 PROCEDURE — 84484 ASSAY OF TROPONIN QUANT: CPT

## 2019-08-16 PROCEDURE — 99285 EMERGENCY DEPT VISIT HI MDM: CPT

## 2019-08-16 PROCEDURE — 74018 RADEX ABDOMEN 1 VIEW: CPT

## 2019-08-16 PROCEDURE — 84478 ASSAY OF TRIGLYCERIDES: CPT

## 2019-08-16 PROCEDURE — 0T9B70Z DRAINAGE OF BLADDER WITH DRAINAGE DEVICE, VIA NATURAL OR ARTIFICIAL OPENING: ICD-10-PCS | Performed by: HOSPITALIST

## 2019-08-16 PROCEDURE — 94002 VENT MGMT INPAT INIT DAY: CPT

## 2019-08-16 PROCEDURE — 94003 VENT MGMT INPAT SUBQ DAY: CPT

## 2019-08-16 PROCEDURE — 31500 INSERT EMERGENCY AIRWAY: CPT

## 2019-08-16 PROCEDURE — 93005 ELECTROCARDIOGRAM TRACING: CPT

## 2019-08-16 PROCEDURE — 85025 COMPLETE CBC W/AUTO DIFF WBC: CPT

## 2019-08-16 PROCEDURE — 80307 DRUG TEST PRSMV CHEM ANLYZR: CPT

## 2019-08-16 PROCEDURE — 84100 ASSAY OF PHOSPHORUS: CPT

## 2019-08-16 PROCEDURE — 87205 SMEAR GRAM STAIN: CPT

## 2019-08-16 PROCEDURE — 83735 ASSAY OF MAGNESIUM: CPT

## 2019-08-16 PROCEDURE — 82803 BLOOD GASES ANY COMBINATION: CPT

## 2019-08-16 PROCEDURE — 36600 WITHDRAWAL OF ARTERIAL BLOOD: CPT

## 2019-08-16 PROCEDURE — 81003 URINALYSIS AUTO W/O SCOPE: CPT

## 2019-08-16 PROCEDURE — 70450 CT HEAD/BRAIN W/O DYE: CPT

## 2019-08-16 SDOH — SOCIAL STABILITY - SOCIAL INSECURITY: OTHER SPECIFIED PROBLEMS RELATED TO PRIMARY SUPPORT GROUP: Z63.8
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
## 2019-08-16 NOTE — ED NOTES
The patient has been provided with discharge education and information.  The patient was also provided with instructions on follow up care and return precautions.  The patient verbalizes understanding of discharge instructions, follow up care, and return precautions.  All questions have been answered. NAD, A/Ox4, good color and appropriate at time of discharge.  Patient ambulated steady gait out of department.

## 2019-08-16 NOTE — ED NOTES
"Patient roomed to bed 38, patient ambulated steady gait.  On monitor, chart up for ERP    Patient states she took 1 tablet of 0.5mg clonazepam.    \"I still had alcohol in my system, and I took one of these and I was concerned\"  "

## 2019-08-16 NOTE — ED NOTES
Patient refusing to come back to room 23 at this time, will continue to ask patient to come back to room

## 2019-08-17 NOTE — ED PROVIDER NOTES
ED Provider Note    CHIEF COMPLAINT  Chief Complaint   Patient presents with   • Alcohol Intoxication     pt reports taking 1 ativan and drinking 4 pints of vodka today     Seen at 430 AM    HPI  Ashleigh Marquis is a 58 y.o. female who presents with no obvious chief complaint. In triage, she was apparently concerned about combining ativan with alcohol. Now in the room she is initially sleeping. When awakened she c/o stabbing pain in bilateral feet. When asked to further explain she then talks of a rash in her right axilla. Lastly she comments on right index finger pain.   Mostly she appears to want to sleep.     REVIEW OF SYSTEMS  See HPI, full ROS limited by patient participation.   PAST MEDICAL HISTORY   has a past medical history of Alcoholism ([facility]), Anxiety, Arthritis, ASTHMA, Cancer (HCC) (1981), Chronic low back pain, Congestive heart failure ([facility]), Depression, EtOH dependence ([facility]), Fall, GERD (gastroesophageal reflux disease), HTN, Hypertension, Indigestion, Muscle disorder, OSTEOPOROSIS, Other specified symptom associated with female genital organs, Psychiatric disorder, PTSD (post-traumatic stress disorder), Renal disorder, Seizure ([facility]), Ulcer, and Vitamin D deficiency.    SOCIAL HISTORY  Social History     Tobacco Use   • Smoking status: Current Every Day Smoker     Packs/day: 0.50     Years: 20.00     Pack years: 10.00     Types: Cigarettes   • Smokeless tobacco: Never Used   • Tobacco comment: 1/2 pack per day   Substance and Sexual Activity   • Alcohol use: Yes     Comment: vodka, heavy use   • Drug use: No   • Sexual activity: Never     Partners: Male       SURGICAL HISTORY   has a past surgical history that includes hernia repair (1977); cysto stent placemnt pre surg (10/7/2010); cystoscopy stent placement (11/9/2010); ureteroscopy (11/9/2010); lasertripsy (11/9/2010); stent removal (11/9/2010); and gastroscopy-endo (N/A, 10/3/2018).    CURRENT MEDICATIONS  Reviewed.  See Encounter Summary.  "    ALLERGIES  Allergies   Allergen Reactions   • Sulfa Drugs Vomiting   • Toradol Vomiting   • Gabapentin      Bowel incontinence     • Amoxicillin Rash     Reported by NAIDA on arrival to ED    Pt states she takes PCN 10/3       PHYSICAL EXAM  VITAL SIGNS: BP (!) 91/57   Pulse 82   Temp 36 °C (96.8 °F) (Temporal)   Resp 16   Ht 1.676 m (5' 6\")   Wt 76 kg (167 lb 8.8 oz)   LMP 11/02/2015   SpO2 93%   BMI 27.04 kg/m²   Constitutional: Sleeping, covering head with arms. Awakens and participates with questions, but prefers to be left alone.   HENT: Normocephalic, atraumatic. Bilateral external ears normal. Nose normal.   Eyes: Conjunctiva normal, non-icteric, EOMI. PERRLA.   Thorax & Lungs: Easy unlabored respirations, CTAB  Cardiovascular:    Abdomen:  No distention  Skin: Visualized skin is  Dry- R axilla has a few hyperpigmented macules consistent with old healed open wounds.   Extremities:   Atraumatic, no tenderness and full ROM of the R hand, fingers. Feet are dirty without sign if infection.   Neurologic: Alert, Grossly non-focal. Sensation intact distally. Ambulated unassisted with normal gait. Normal speech.   Psychiatric: Affect and Mood normal    RADIOLOGY  No orders to display       Nursing notes and vital signs were reviewed. (See chart for details) Medical record reviewed. Patient had labwork 10 days ago.   Decision Making:  This is a 58 y.o. year old female who presents with no obvious chief complaint. She is not clinically intoxicated and stable for discharge. I see no obvious emergent process and the patient was able to ambulate unassisted from the department.     DISPOSITION:  Patient will be discharged home in good condition.    Discharge Medications:  There are no discharge medications for this patient.      The patient was discharged home (see d/c instructions) and told to return immediately for any signs or symptoms listed, or any worsening at all.  The patient verbally agreed to the " discharge precautions and follow-up plan which is documented in EPIC.          FINAL IMPRESSION  1. Alcoholic intoxication without complication (HCC)

## 2019-08-19 VITALS — DIASTOLIC BLOOD PRESSURE: 88 MMHG | SYSTOLIC BLOOD PRESSURE: 153 MMHG

## 2019-08-31 ENCOUNTER — HOSPITAL ENCOUNTER (EMERGENCY)
Facility: MEDICAL CENTER | Age: 59
End: 2019-08-31
Attending: EMERGENCY MEDICINE
Payer: MEDICAID

## 2019-08-31 VITALS
TEMPERATURE: 98.7 F | RESPIRATION RATE: 17 BRPM | OXYGEN SATURATION: 100 % | BODY MASS INDEX: 26.57 KG/M2 | SYSTOLIC BLOOD PRESSURE: 105 MMHG | HEART RATE: 70 BPM | HEIGHT: 66 IN | WEIGHT: 165.34 LBS | DIASTOLIC BLOOD PRESSURE: 68 MMHG

## 2019-08-31 DIAGNOSIS — F10.920 ALCOHOLIC INTOXICATION WITHOUT COMPLICATION (HCC): ICD-10-CM

## 2019-08-31 LAB — GLUCOSE BLD-MCNC: 105 MG/DL (ref 65–99)

## 2019-08-31 PROCEDURE — 82962 GLUCOSE BLOOD TEST: CPT

## 2019-08-31 PROCEDURE — 99284 EMERGENCY DEPT VISIT MOD MDM: CPT

## 2019-08-31 ASSESSMENT — LIFESTYLE VARIABLES
HAVE YOU EVER FELT YOU SHOULD CUT DOWN ON YOUR DRINKING: NO
TOTAL SCORE: 0
DO YOU DRINK ALCOHOL: YES
EVER HAD A DRINK FIRST THING IN THE MORNING TO STEADY YOUR NERVES TO GET RID OF A HANGOVER: NO
HAVE PEOPLE ANNOYED YOU BY CRITICIZING YOUR DRINKING: NO
TOTAL SCORE: 0
CONSUMPTION TOTAL: INCOMPLETE
EVER FELT BAD OR GUILTY ABOUT YOUR DRINKING: NO
DOES PATIENT WANT TO STOP DRINKING: NO
TOTAL SCORE: 0

## 2019-08-31 NOTE — DISCHARGE INSTRUCTIONS
You were seen in the ED today for alcohol intoxication. While here you were examined and allowed to sober up while we monitored you. Your physical exam was reassuring. If you continue to drink you will be placing your health at risk. At this time you are sober and can return home.    Please be alert for signs of alcohol withdrawal, including shaking hands, agitation, feeling pins and needles, nausea, vomiting, headache. Please seek medical care if you develop these. Alcohol withdrawal can be dangerous and even lead to seizures or death if untreated.

## 2019-08-31 NOTE — ED NOTES
Report recd and care assumed.  Pt is resting in bed in no obvious distress.  Pt attached to cardiac monitor with vital signs cycling.  Will continue to monitor.
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
## 2019-08-31 NOTE — ED NOTES
Pt given discharge instructions and able to ambulate with steady gait out of department.  Vital signs stable.  Pt able to sign to affirm understanding.

## 2019-08-31 NOTE — ED PROVIDER NOTES
ER Provider Addendum Note     Scribed for Alpa Chung M.D. by Thais Dee. 2019, 7:47 AM.    This is an addendum to the note on Ashleigh Marquis. For further details and full chart entry, see the previously signed ED Provider Note written by Dr. Henson ([facility], [place]).     MEDICAL DECISION MAKIN:54 AM - Patient's case was transferred into my care by Dr. Henson. See previously signed note for further details. Awaiting clinical sobriety.     8:55 AM The patient is tolerating PO challenge and ambulatory. The patient will be discharged and should return if symptoms worsen or if new symptoms arise. The patient understands and agrees to plan.      The patient will return for new or worsening symptoms and is stable at the time of discharge.    The patient is referred to a primary physician for blood pressure management, diabetic screening, and for all other preventative health concerns.    DISPOSITION:  Patient will be discharged home in stable condition.    FOLLOW UP:  [address] [address]  [phone]  In 2 days  As needed    [address] [id number] [phone]  In 2 days  As needed      FINAL IMPRESSION   1. Alcoholic intoxication without complication (HCC)         Thais GAN (Cayetano), am scribing for, and in the presence of, Alpa Chung M.D..    Electronically signed by: Thais Dee (Cayetano), 2019    Alpa GAN M.D. personally performed the services described in this documentation, as scribed by Thais Dee in my presence, and it is both accurate and complete.    The note accurately reflects work and decisions made by me.  Alpa Chung  2019  12:41 PM

## 2019-08-31 NOTE — ED NOTES
Patient resting in bed, sleeping, no signs of pain or distress, unlabored breathing noted, attached to cardiac monitor, bed in low position, call light within reach, occasionally repositions self, frequent rounding performed.

## 2019-08-31 NOTE — ED NOTES
Patient resting in bed, sleeping, no signs of pain or distress, unlabored breathing noted, attached to cardiac monitor, bed in low position, call light within reach, frequent rounding performed.
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
## 2019-09-21 ENCOUNTER — APPOINTMENT (OUTPATIENT)
Dept: RADIOLOGY | Facility: MEDICAL CENTER | Age: 59
End: 2019-09-21
Attending: EMERGENCY MEDICINE
Payer: MEDICAID

## 2019-09-21 ENCOUNTER — HOSPITAL ENCOUNTER (EMERGENCY)
Facility: MEDICAL CENTER | Age: 59
End: 2019-09-21
Attending: EMERGENCY MEDICINE
Payer: MEDICAID

## 2019-09-21 VITALS
HEIGHT: 66 IN | SYSTOLIC BLOOD PRESSURE: 122 MMHG | BODY MASS INDEX: 26.52 KG/M2 | RESPIRATION RATE: 18 BRPM | OXYGEN SATURATION: 98 % | TEMPERATURE: 98.4 F | WEIGHT: 165 LBS | DIASTOLIC BLOOD PRESSURE: 74 MMHG | HEART RATE: 72 BPM
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
## 2019-09-21 DIAGNOSIS — F10.920 ALCOHOLIC INTOXICATION WITHOUT COMPLICATION (HCC): ICD-10-CM

## 2019-09-21 DIAGNOSIS — M54.2 NECK PAIN: ICD-10-CM

## 2019-09-21 DIAGNOSIS — R51.9 FACIAL PAIN: ICD-10-CM

## 2019-09-21 LAB
ALBUMIN SERPL BCP-MCNC: 4.6 G/DL (ref 3.2–4.9)
ALBUMIN/GLOB SERPL: 1.5 G/DL
ALP SERPL-CCNC: 116 U/L (ref 30–99)
ALT SERPL-CCNC: 14 U/L (ref 2–50)
AMPHET UR QL SCN: NEGATIVE
ANION GAP SERPL CALC-SCNC: 11 MMOL/L (ref 0–11.9)
APTT PPP: 28 SEC (ref 24.7–36)
AST SERPL-CCNC: 31 U/L (ref 12–45)
BARBITURATES UR QL SCN: NEGATIVE
BASE EXCESS BLDV CALC-SCNC: -7 MMOL/L
BASOPHILS # BLD AUTO: 1.7 % (ref 0–1.8)
BASOPHILS # BLD: 0.11 K/UL (ref 0–0.12)
BENZODIAZ UR QL SCN: POSITIVE
BILIRUB SERPL-MCNC: 0.2 MG/DL (ref 0.1–1.5)
BODY TEMPERATURE: ABNORMAL CENTIGRADE
BUN SERPL-MCNC: 16 MG/DL (ref 8–22)
BZE UR QL SCN: NEGATIVE
CALCIUM SERPL-MCNC: 9.2 MG/DL (ref 8.5–10.5)
CANNABINOIDS UR QL SCN: NEGATIVE
CHLORIDE SERPL-SCNC: 106 MMOL/L (ref 96–112)
CO2 SERPL-SCNC: 20 MMOL/L (ref 20–33)
CREAT SERPL-MCNC: 0.83 MG/DL (ref 0.5–1.4)
EOSINOPHIL # BLD AUTO: 0.1 K/UL (ref 0–0.51)
EOSINOPHIL NFR BLD: 1.5 % (ref 0–6.9)
ERYTHROCYTE [DISTWIDTH] IN BLOOD BY AUTOMATED COUNT: 55.8 FL (ref 35.9–50)
ETHANOL BLD-MCNC: 0.36 G/DL
GLOBULIN SER CALC-MCNC: 3.1 G/DL (ref 1.9–3.5)
GLUCOSE SERPL-MCNC: 67 MG/DL (ref 65–99)
HCG SERPL QL: NEGATIVE
HCO3 BLDV-SCNC: 18 MMOL/L (ref 24–28)
HCT VFR BLD AUTO: 36.4 % (ref 37–47)
HGB BLD-MCNC: 11.9 G/DL (ref 12–16)
IMM GRANULOCYTES # BLD AUTO: 0.01 K/UL (ref 0–0.11)
IMM GRANULOCYTES NFR BLD AUTO: 0.2 % (ref 0–0.9)
INR PPP: 0.97 (ref 0.87–1.13)
LIPASE SERPL-CCNC: 34 U/L (ref 11–82)
LYMPHOCYTES # BLD AUTO: 3.47 K/UL (ref 1–4.8)
LYMPHOCYTES NFR BLD: 52.3 % (ref 22–41)
MCH RBC QN AUTO: 30.3 PG (ref 27–33)
MCHC RBC AUTO-ENTMCNC: 32.7 G/DL (ref 33.6–35)
MCV RBC AUTO: 92.6 FL (ref 81.4–97.8)
METHADONE UR QL SCN: NEGATIVE
MONOCYTES # BLD AUTO: 0.32 K/UL (ref 0–0.85)
MONOCYTES NFR BLD AUTO: 4.8 % (ref 0–13.4)
NEUTROPHILS # BLD AUTO: 2.62 K/UL (ref 2–7.15)
NEUTROPHILS NFR BLD: 39.5 % (ref 44–72)
NRBC # BLD AUTO: 0 K/UL
NRBC BLD-RTO: 0 /100 WBC
OPIATES UR QL SCN: NEGATIVE
OXYCODONE UR QL SCN: NEGATIVE
PCO2 BLDV: 38.1 MMHG (ref 41–51)
PCP UR QL SCN: NEGATIVE
PH BLDV: 7.3 [PH] (ref 7.31–7.45)
PLATELET # BLD AUTO: 356 K/UL (ref 164–446)
PMV BLD AUTO: 9.9 FL (ref 9–12.9)
PO2 BLDV: 27.1 MMHG (ref 25–40)
POTASSIUM SERPL-SCNC: 3.4 MMOL/L (ref 3.6–5.5)
PROPOXYPH UR QL SCN: NEGATIVE
PROT SERPL-MCNC: 7.7 G/DL (ref 6–8.2)
PROTHROMBIN TIME: 13 SEC (ref 12–14.6)
RBC # BLD AUTO: 3.93 M/UL (ref 4.2–5.4)
SAO2 % BLDV: 35.6 %
SODIUM SERPL-SCNC: 137 MMOL/L (ref 135–145)
WBC # BLD AUTO: 6.6 K/UL (ref 4.8–10.8)

## 2019-09-21 PROCEDURE — 83690 ASSAY OF LIPASE: CPT

## 2019-09-21 PROCEDURE — 80307 DRUG TEST PRSMV CHEM ANLYZR: CPT

## 2019-09-21 PROCEDURE — 70450 CT HEAD/BRAIN W/O DYE: CPT

## 2019-09-21 PROCEDURE — 80053 COMPREHEN METABOLIC PANEL: CPT

## 2019-09-21 PROCEDURE — 99285 EMERGENCY DEPT VISIT HI MDM: CPT

## 2019-09-21 PROCEDURE — 84703 CHORIONIC GONADOTROPIN ASSAY: CPT

## 2019-09-21 PROCEDURE — 85610 PROTHROMBIN TIME: CPT

## 2019-09-21 PROCEDURE — 85025 COMPLETE CBC W/AUTO DIFF WBC: CPT

## 2019-09-21 PROCEDURE — 85730 THROMBOPLASTIN TIME PARTIAL: CPT

## 2019-09-21 PROCEDURE — 700105 HCHG RX REV CODE 258: Performed by: EMERGENCY MEDICINE

## 2019-09-21 PROCEDURE — 70486 CT MAXILLOFACIAL W/O DYE: CPT

## 2019-09-21 PROCEDURE — 82803 BLOOD GASES ANY COMBINATION: CPT

## 2019-09-21 PROCEDURE — 700102 HCHG RX REV CODE 250 W/ 637 OVERRIDE(OP): Performed by: EMERGENCY MEDICINE

## 2019-09-21 PROCEDURE — 72125 CT NECK SPINE W/O DYE: CPT

## 2019-09-21 PROCEDURE — 71045 X-RAY EXAM CHEST 1 VIEW: CPT

## 2019-09-21 PROCEDURE — A9270 NON-COVERED ITEM OR SERVICE: HCPCS | Performed by: EMERGENCY MEDICINE

## 2019-09-21 RX ORDER — FOLIC ACID 1 MG/1
1 TABLET ORAL ONCE
Status: COMPLETED | OUTPATIENT
Start: 2019-09-21 | End: 2019-09-21

## 2019-09-21 RX ORDER — SODIUM CHLORIDE 9 MG/ML
1000 INJECTION, SOLUTION INTRAVENOUS ONCE
Status: COMPLETED | OUTPATIENT
Start: 2019-09-21 | End: 2019-09-21

## 2019-09-21 RX ORDER — THIAMINE MONONITRATE (VIT B1) 100 MG
100 TABLET ORAL ONCE
Status: COMPLETED | OUTPATIENT
Start: 2019-09-21 | End: 2019-09-21

## 2019-09-21 RX ADMIN — SODIUM CHLORIDE 1000 ML: 9 INJECTION, SOLUTION INTRAVENOUS at 01:51

## 2019-09-21 RX ADMIN — FOLIC ACID 1 MG: 1 TABLET ORAL at 04:25

## 2019-09-21 RX ADMIN — Medication 100 MG: at 04:26
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
## 2019-09-21 NOTE — ED NOTES
Pt is awake and alert enough to carry on conversation with staff. Encouraged to ambulate to determine safety- pt declined reporting she is cold and wants to sleep. All lab work completed, placed pt up for re-evaluation.

## 2019-09-22 ENCOUNTER — HOSPITAL ENCOUNTER (EMERGENCY)
Facility: MEDICAL CENTER | Age: 59
End: 2019-09-23
Attending: EMERGENCY MEDICINE
Payer: MEDICAID

## 2019-09-22 DIAGNOSIS — F10.920 ALCOHOLIC INTOXICATION WITHOUT COMPLICATION (HCC): ICD-10-CM

## 2019-09-22 LAB
ALBUMIN SERPL BCP-MCNC: 3.5 G/DL (ref 3.2–4.9)
ALBUMIN/GLOB SERPL: 1.3 G/DL
ALP SERPL-CCNC: 108 U/L (ref 30–99)
ALT SERPL-CCNC: 12 U/L (ref 2–50)
AMPHET UR QL SCN: NEGATIVE
ANION GAP SERPL CALC-SCNC: 11 MMOL/L (ref 0–11.9)
APAP SERPL-MCNC: <10 UG/ML (ref 10–30)
AST SERPL-CCNC: 29 U/L (ref 12–45)
BARBITURATES UR QL SCN: NEGATIVE
BASOPHILS # BLD AUTO: 1.9 % (ref 0–1.8)
BASOPHILS # BLD: 0.11 K/UL (ref 0–0.12)
BENZODIAZ UR QL SCN: POSITIVE
BILIRUB SERPL-MCNC: 0.3 MG/DL (ref 0.1–1.5)
BUN SERPL-MCNC: 14 MG/DL (ref 8–22)
BZE UR QL SCN: NEGATIVE
CALCIUM SERPL-MCNC: 8.6 MG/DL (ref 8.5–10.5)
CANNABINOIDS UR QL SCN: NEGATIVE
CHLORIDE SERPL-SCNC: 108 MMOL/L (ref 96–112)
CO2 SERPL-SCNC: 21 MMOL/L (ref 20–33)
CREAT SERPL-MCNC: 0.89 MG/DL (ref 0.5–1.4)
EKG IMPRESSION: NORMAL
EOSINOPHIL # BLD AUTO: 0.08 K/UL (ref 0–0.51)
EOSINOPHIL NFR BLD: 1.4 % (ref 0–6.9)
ERYTHROCYTE [DISTWIDTH] IN BLOOD BY AUTOMATED COUNT: 55.6 FL (ref 35.9–50)
ETHANOL BLD-MCNC: 0.39 G/DL
GLOBULIN SER CALC-MCNC: 2.7 G/DL (ref 1.9–3.5)
GLUCOSE SERPL-MCNC: 80 MG/DL (ref 65–99)
HCT VFR BLD AUTO: 33.8 % (ref 37–47)
HGB BLD-MCNC: 11.1 G/DL (ref 12–16)
IMM GRANULOCYTES # BLD AUTO: 0.01 K/UL (ref 0–0.11)
IMM GRANULOCYTES NFR BLD AUTO: 0.2 % (ref 0–0.9)
LYMPHOCYTES # BLD AUTO: 2.29 K/UL (ref 1–4.8)
LYMPHOCYTES NFR BLD: 40.3 % (ref 22–41)
MCH RBC QN AUTO: 30.6 PG (ref 27–33)
MCHC RBC AUTO-ENTMCNC: 32.8 G/DL (ref 33.6–35)
MCV RBC AUTO: 93.1 FL (ref 81.4–97.8)
METHADONE UR QL SCN: NEGATIVE
MONOCYTES # BLD AUTO: 0.41 K/UL (ref 0–0.85)
MONOCYTES NFR BLD AUTO: 7.2 % (ref 0–13.4)
NEUTROPHILS # BLD AUTO: 2.78 K/UL (ref 2–7.15)
NEUTROPHILS NFR BLD: 49 % (ref 44–72)
NRBC # BLD AUTO: 0 K/UL
NRBC BLD-RTO: 0 /100 WBC
OPIATES UR QL SCN: NEGATIVE
OXYCODONE UR QL SCN: NEGATIVE
PCP UR QL SCN: NEGATIVE
PLATELET # BLD AUTO: 324 K/UL (ref 164–446)
PMV BLD AUTO: 9.6 FL (ref 9–12.9)
POTASSIUM SERPL-SCNC: 3.6 MMOL/L (ref 3.6–5.5)
PROPOXYPH UR QL SCN: NEGATIVE
PROT SERPL-MCNC: 6.2 G/DL (ref 6–8.2)
RBC # BLD AUTO: 3.63 M/UL (ref 4.2–5.4)
SALICYLATES SERPL-MCNC: 0 MG/DL (ref 15–25)
SODIUM SERPL-SCNC: 140 MMOL/L (ref 135–145)
WBC # BLD AUTO: 5.7 K/UL (ref 4.8–10.8)

## 2019-09-22 PROCEDURE — 93005 ELECTROCARDIOGRAM TRACING: CPT | Performed by: EMERGENCY MEDICINE

## 2019-09-22 PROCEDURE — 80053 COMPREHEN METABOLIC PANEL: CPT

## 2019-09-22 PROCEDURE — 85025 COMPLETE CBC W/AUTO DIFF WBC: CPT

## 2019-09-22 PROCEDURE — 700105 HCHG RX REV CODE 258: Performed by: EMERGENCY MEDICINE

## 2019-09-22 PROCEDURE — 36415 COLL VENOUS BLD VENIPUNCTURE: CPT

## 2019-09-22 PROCEDURE — 80307 DRUG TEST PRSMV CHEM ANLYZR: CPT

## 2019-09-22 RX ORDER — QUETIAPINE FUMARATE 25 MG/1
50 TABLET, FILM COATED ORAL
COMMUNITY
End: 2020-07-20

## 2019-09-22 RX ORDER — SODIUM CHLORIDE 9 MG/ML
1000 INJECTION, SOLUTION INTRAVENOUS ONCE
Status: COMPLETED | OUTPATIENT
Start: 2019-09-22 | End: 2019-09-23

## 2019-09-22 RX ORDER — LISINOPRIL 10 MG/1
10 TABLET ORAL DAILY
COMMUNITY
End: 2020-07-20

## 2019-09-22 RX ORDER — DOXEPIN HYDROCHLORIDE 10 MG/1
10 CAPSULE ORAL NIGHTLY
COMMUNITY
End: 2020-07-20

## 2019-09-22 RX ORDER — CLONAZEPAM 0.5 MG/1
0.5 TABLET ORAL 3 TIMES DAILY
Status: ON HOLD | COMMUNITY
End: 2019-10-03

## 2019-09-22 RX ORDER — SODIUM CHLORIDE 9 MG/ML
2000 INJECTION, SOLUTION INTRAVENOUS ONCE
Status: COMPLETED | OUTPATIENT
Start: 2019-09-22 | End: 2019-09-22

## 2019-09-22 RX ADMIN — SODIUM CHLORIDE 1000 ML: 9 INJECTION, SOLUTION INTRAVENOUS at 21:25

## 2019-09-22 RX ADMIN — SODIUM CHLORIDE 2000 ML: 9 INJECTION, SOLUTION INTRAVENOUS at 17:14

## 2019-09-22 ASSESSMENT — LIFESTYLE VARIABLES: DO YOU DRINK ALCOHOL: YES
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
## 2019-09-23 VITALS
RESPIRATION RATE: 16 BRPM | SYSTOLIC BLOOD PRESSURE: 118 MMHG | HEART RATE: 94 BPM | DIASTOLIC BLOOD PRESSURE: 72 MMHG | TEMPERATURE: 98.4 F | BODY MASS INDEX: 26.52 KG/M2 | WEIGHT: 165 LBS | HEIGHT: 66 IN | OXYGEN SATURATION: 94 %

## 2019-09-23 LAB — POC BREATHALIZER: 0.18 PERCENT (ref 0–0.01)

## 2019-09-23 PROCEDURE — 99285 EMERGENCY DEPT VISIT HI MDM: CPT

## 2019-09-23 PROCEDURE — 90791 PSYCH DIAGNOSTIC EVALUATION: CPT

## 2019-09-23 PROCEDURE — 302970 POC BREATHALIZER: Performed by: EMERGENCY MEDICINE

## 2019-09-23 RX ORDER — LORAZEPAM 1 MG/1
1 TABLET ORAL ONCE
Status: DISCONTINUED | OUTPATIENT
Start: 2019-09-23 | End: 2019-09-23 | Stop reason: HOSPADM

## 2019-09-23 NOTE — ED NOTES
TETO Ring spoke with Dr. Mckinney: 2L fluid nearly complete, patient still with low BP.      Patient resting comfortably in bed with no obvious distress.

## 2019-09-23 NOTE — ED PROVIDER NOTES
ED Provider Note    Addendum:  The patient is now sober and able to speak with [facility].  She is not currently suicidal and desires discharge.  I believe she is safe to be discharged home at this time.      The patient will return for new or worsening symptoms and is stable at the time of discharge.    The patient is referred to a primary physician for blood pressure management, diabetic screening, and for all other preventative health concerns.        DISPOSITION:  Patient will be discharged home in stable condition.    FOLLOW UP:  [address] [id number] [phone]  Call in 2 days  for recheck      OUTPATIENT MEDICATIONS:  New Prescriptions    No medications on file     '    1. Alcoholic intoxication without complication (HCC)      discharged

## 2019-09-23 NOTE — ED NOTES
Pt cleaned up of incont urine.   Pt placed on bedpan and urine sample collected.    Urine sent to lab.

## 2019-09-23 NOTE — ED PROVIDER NOTES
"ED Provider Note    CHIEF COMPLAINT  Chief Complaint   Patient presents with   • Alcohol Intoxication   • ALOC   • Suicidal Ideation   • Drug Overdose       HPI  Ashleigh Marquis is a 58 y.o. female who presents to the emergency department intoxicated apparently expressing suicidal ideation to the EMS crew and stating that she overdosed on Xanax.  The patient apparently told EMS crew that she took 10 Xanax tablets and drank alcohol.  She is quite somnolent at the time of arrival and even now that she is awake and asking for a meal tray she refuses to provide any history.  Chart review shows the patient is a very frequent emergency department visitor with alcohol intoxication typically presenting with altered mental state and hypotension and responsive to fluids.    REVIEW OF SYSTEMS the patient would not provide any helpful review of systems information    PAST MEDICAL HISTORY  Past Medical History:   Diagnosis Date   • Alcoholism (HCC)    • Anxiety    • Arthritis    • ASTHMA    • Cancer (HCC) 1981    cervical   • Chronic low back pain    • Congestive heart failure (HCC)    • Depression    • EtOH dependence (HCC)    • Fall     alcohol related   • GERD (gastroesophageal reflux disease)    • HTN    • Hypertension    • Indigestion     GERD   • Muscle disorder    • OSTEOPOROSIS    • Other specified symptom associated with female genital organs     \"I have fibroids bad\"\"   • Psychiatric disorder    • PTSD (post-traumatic stress disorder)    • Renal disorder     Hx of stones   • Seizure (HCC)     several years ago r/t alcohol withdrawl   • Ulcer    • Vitamin D deficiency        FAMILY HISTORY  Family History   Problem Relation Age of Onset   • Heart Disease Father    • Hypertension Father    • Diabetes Father    • Cancer Paternal Grandmother    • Depression Other    • Lung Disease Neg Hx    • Stroke Neg Hx        SOCIAL HISTORY  Social History     Socioeconomic History   • Marital status:      Spouse name: Not " on file   • Number of children: Not on file   • Years of education: Not on file   • Highest education level: Not on file   Occupational History   • Not on file   Social Needs   • Financial resource strain: Not on file   • Food insecurity:     Worry: Not on file     Inability: Not on file   • Transportation needs:     Medical: Not on file     Non-medical: Not on file   Tobacco Use   • Smoking status: Current Every Day Smoker     Packs/day: 0.50     Years: 20.00     Pack years: 10.00     Types: Cigarettes   • Smokeless tobacco: Never Used   • Tobacco comment: 1/2 pack per day   Substance and Sexual Activity   • Alcohol use: Yes     Comment: vodka, heavy use   • Drug use: No   • Sexual activity: Never     Partners: Male   Lifestyle   • Physical activity:     Days per week: Not on file     Minutes per session: Not on file   • Stress: Not on file   Relationships   • Social connections:     Talks on phone: Not on file     Gets together: Not on file     Attends Taoism service: Not on file     Active member of club or organization: Not on file     Attends meetings of clubs or organizations: Not on file     Relationship status: Not on file   • Intimate partner violence:     Fear of current or ex partner: Not on file     Emotionally abused: Not on file     Physically abused: Not on file     Forced sexual activity: Not on file   Other Topics Concern   • Not on file   Social History Narrative    ** Merged History Encounter **            SURGICAL HISTORY  Past Surgical History:   Procedure Laterality Date   • GASTROSCOPY-ENDO N/A 10/3/2018    Procedure: GASTROSCOPY-ENDO;  Surgeon: Ben Negro M.D.;  Location: [facility];  Service: Gastroenterology   • CYSTOSCOPY STENT PLACEMENT  11/9/2010    Performed by ANGEL HARRIS at [facility]   • URETEROSCOPY  11/9/2010    Performed by ANGEL HARRIS at [facility]   • LASERTRIPSY  11/9/2010    Performed by ANGEL HARRIS at [facility]  "[place] [facility]   • STENT REMOVAL  11/9/2010    Performed by ANGEL HARRIS at SUR[facility]ERY [facility]   • CYSTO STENT PLACEMNT PRE SURG  10/7/2010    Performed by ANGEL HARRIS at SUR[facility]ERY [facility]   • HERNIA REPAIR  1977       CURRENT MEDICATIONS  Home Medications     Reviewed by Portia Steiner R.N. (Registered Nurse) on 09/22/19 at 1706  Med List Status: Partial   Medication Last Dose Status   clonazePAM (KLONOPIN) 0.5 MG Tab  Active   doxepin (SINEQUAN) 10 MG Cap  Active   lisinopril (PRINIVIL) 10 MG Tab  Active   metoprolol (LOPRESSOR) 25 MG Tab  Active   QUEtiapine (SEROQUEL) 25 MG Tab  Active                ALLERGIES  Allergies   Allergen Reactions   • Sulfa Drugs Vomiting   • Toradol Vomiting   • Gabapentin      Bowel incontinence     • Amoxicillin Rash     Reported by NAIDA on arrival to ED    Pt states she takes PCN 10/3       PHYSICAL EXAM  VITAL SIGNS: BP (!) 87/56   Pulse 72   Temp 36.9 °C (98.4 °F) (Temporal)   Resp 16   Ht 1.676 m (5' 6\")   Wt 74.8 kg (165 lb)   LMP 11/02/2015   SpO2 97%   BMI 26.63 kg/m²    Oxygen saturation is interpreted as adequate  Constitutional: Somnolent at the time of arrival mental status is clearly improving over time  HENT: No sign of trauma to the head  Eyes: No erythema discharge or jaundice  Neck: Trachea midline no JVD  Cardiovascular: Regular rate and rhythm  Lungs: Clear and equal bilaterally with no apparent difficulty breathing  Abdomen/Back: Soft nondistended nontender  Skin: Warm and dry  Musculoskeletal: No acute bony deformity  Neurologic: Somnolent but improving now awake verbal arousable    CHART REVIEW  As noted above I reviewed the patient's chart she typically presents intoxicated with hypotension and recovers with fluid resuscitation.    Laboratory  CBC shows a normal white blood cell count of 5.7 hemoglobin is adequate at 11.1, complete metabolic panel is unremarkable aspirin and Tylenol levels are nontoxic blood alcohol level is elevated at " 0.39.  Urine toxicology screen is positive for benzodiazepines only    MEDICAL DECISION MAKING and DISPOSITION  The patient did have a bottle of Xanax tablets with her but the pill count compared to the date of the prescription shows that there are no pills unaccounted for.  The patient's alcohol level is very high.  Due to her altered mental state she was initially kept n.p.o. and therefore not given an oral fluid challenge because of hypotension an IV was established and she was given intravenous normal saline and her blood pressure has been improving I was just in her room and her blood pressure right now is 96 systolic.  The patient is now awake and asking for a meal tray so I have ordered a regular diet for her.  At this point in time she does seem to be improving she is going to need to sober up and if she is expressing any suicidal ideation she will require a mental health evaluation.  The patient's care will be signed out to the oncoming ER doctor    IMPRESSION  1.  Alcohol intoxication  2.  Hypotension responsive to intravenous fluid resuscitation         Electronically signed by: Frederick Mckinney, 9/22/2019 9:37 PM

## 2019-09-23 NOTE — ED NOTES
Patient requesting to have her belonging and to go home.  Jhonathan IRENE spoke with Dr. Dutta, he advised to have another breathalyzer done to see if patient could consult with [facility].

## 2019-09-23 NOTE — ED NOTES
Lighter taken by security after inspection of pt belongings.  Medications to pharmacy.   Two pt belonging bags and one black back pack placed in locker #3

## 2019-09-23 NOTE — ED NOTES
Patient is resting comfortably.  Tech Dianelys previously helped patient on to bedpan without issue.

## 2019-09-23 NOTE — ED NOTES
Jhonathan IRENE spoke with Dr. Chung to discuss consult with [facility] - patient to be discharged.

## 2019-09-23 NOTE — ED NOTES
Patient sitting up on edge of bed requesting, once again, to have her things and be allowed to leave.  TETO Ring explained that she would have to consult with [facility] before getting her things back, bu that she could rest comfortably, eat and get hydrated through the night until that was an option.  Patient agreed and was provided with a lunchbox.      Jhonathan RN to replace  IV after patient is done eating.

## 2019-09-23 NOTE — ED NOTES
Patient asleep in bed with no evidence of distress.  IV deferred for now, as patient sleeping comfortably.

## 2019-09-23 NOTE — ED NOTES
Patient given her belongings (2 plastic bags, backpack, meds from pharmacy) and discharge instructions reviewed.

## 2019-09-23 NOTE — ED PROVIDER NOTES
ED Provider Note    12:13 AM  At this time, the patient is agitated and states that she would like to leave.  I did explain to the patient that she cannot leave because she is still intoxicated, and she made threats about wanting to harm herself.  She will have to stay here in the hospital, until she is sober, and then she will be seen by [facility].  Her blood pressure has improved after IV fluids, and she has no medical concerns at this time.  Nursing staff informed me that they did pull the left IJ secondary to the patient having some infiltration around this area.  She will have p.o. fluids at this point.    5:12 AM  Pt is still intoxicated.  When sober, she will be evaluated.   I will give her ativan while here, to avoid withdrawal.     5:55 AM  Dr. Chung will take the pt over at this time.
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
## 2019-09-23 NOTE — ED NOTES
Assist RN: Pt complained of pain at IV site. IV site found to be infiltrated. IV fluids stopped. IV cath removed. ERP notified.

## 2019-09-27 ENCOUNTER — HOSPITAL ENCOUNTER (EMERGENCY)
Facility: MEDICAL CENTER | Age: 59
End: 2019-09-27
Attending: EMERGENCY MEDICINE
Payer: MEDICAID

## 2019-09-27 VITALS
SYSTOLIC BLOOD PRESSURE: 110 MMHG | HEART RATE: 80 BPM | OXYGEN SATURATION: 95 % | RESPIRATION RATE: 20 BRPM | TEMPERATURE: 97.3 F | DIASTOLIC BLOOD PRESSURE: 62 MMHG

## 2019-09-27 DIAGNOSIS — E86.0 DEHYDRATION: ICD-10-CM

## 2019-09-27 DIAGNOSIS — F10.920 ALCOHOLIC INTOXICATION WITHOUT COMPLICATION (HCC): ICD-10-CM

## 2019-09-27 LAB — ETHANOL BLD-MCNC: 0.31 G/DL

## 2019-09-27 PROCEDURE — 80307 DRUG TEST PRSMV CHEM ANLYZR: CPT

## 2019-09-27 PROCEDURE — 700105 HCHG RX REV CODE 258: Performed by: EMERGENCY MEDICINE

## 2019-09-27 PROCEDURE — 99284 EMERGENCY DEPT VISIT MOD MDM: CPT

## 2019-09-27 PROCEDURE — 36415 COLL VENOUS BLD VENIPUNCTURE: CPT

## 2019-09-27 RX ORDER — SODIUM CHLORIDE 9 MG/ML
1000 INJECTION, SOLUTION INTRAVENOUS ONCE
Status: COMPLETED | OUTPATIENT
Start: 2019-09-27 | End: 2019-09-27

## 2019-09-27 RX ADMIN — SODIUM CHLORIDE 1000 ML: 9 INJECTION, SOLUTION INTRAVENOUS at 13:15

## 2019-09-27 NOTE — ED NOTES
Pt ambulatory to restroom w steady gait.    Pt hit her call light and notified staff before getting out of bed.    AOX4.

## 2019-09-27 NOTE — ED TRIAGE NOTES
Per EMS, bystanders called bc pt was laying in the middle of the street. +ETOH. Pt refusing to answer questions on arrival. Pt alert to painful stimuli will not open her eyes. resp equal unlabored. Maintaining o2 sats 95% on RA. PERRLA 4mm pupils bilateral.

## 2019-09-27 NOTE — ED PROVIDER NOTES
ED Provider Note    Scribed for Israel Craig M.D. by Ashleigh Jimenez. 9/27/2019, 12:53 PM.    Primary care provider: None noted  Means of arrival: EMS  History obtained from: Patient   History limited by: Altered Mental Status secondary to alcohol intoxication.    CHIEF COMPLAINT  Chief Complaint   Patient presents with   • Alcohol Intoxication       HPI  Ashleigh Marquis is a 58 y.o. female who presents to the Emergency Department for alcohol intoxication. Per nursing notes, patient was found in the street, intoxicated, and unable to ambulate. EMS proceeded to bring her into this facility. Upon my evaluation, patient is asleep and not responding to questions. She does wake to painful stimuli. She appears to be intoxicated. Negative for fever.    HPI limited to Altered Mental Status secondary to alcohol intoxication.    REVIEW OF SYSTEMS  Pertinent positives include alcohol intoxication.   Pertinent negatives include no fever.    ROS limited to Altered Mental Status secondary to alcohol intoxication.    PAST MEDICAL HISTORY   has a past medical history of Alcoholism ([facility]), Anxiety, Arthritis, ASTHMA, Cancer (HCC) (1981), Chronic low back pain, Congestive heart failure (HCC), Depression, EtOH dependence ([facility]), Fall, GERD (gastroesophageal reflux disease), HTN, Hypertension, Indigestion, Muscle disorder, OSTEOPOROSIS, Other specified symptom associated with female genital organs, Psychiatric disorder, PTSD (post-traumatic stress disorder), Renal disorder, Seizure (HCC), Ulcer, and Vitamin D deficiency.    SURGICAL HISTORY   has a past surgical history that includes hernia repair (1977); cysto stent placemnt pre surg (10/7/2010); cystoscopy stent placement (11/9/2010); ureteroscopy (11/9/2010); lasertripsy (11/9/2010); stent removal (11/9/2010); and gastroscopy-endo (N/A, 10/3/2018).    SOCIAL HISTORY  Social History     Tobacco Use   • Smoking status: Current Every Day Smoker     Packs/day: 0.50     Years:  20.00     Pack years: 10.00     Types: Cigarettes   • Smokeless tobacco: Never Used   • Tobacco comment: 1/2 pack per day   Substance Use Topics   • Alcohol use: Yes     Comment: vodka, heavy use   • Drug use: No      Social History     Substance and Sexual Activity   Drug Use No       FAMILY HISTORY  Family History   Problem Relation Age of Onset   • Heart Disease Father    • Hypertension Father    • Diabetes Father    • Cancer Paternal Grandmother    • Depression Other    • Lung Disease Neg Hx    • Stroke Neg Hx        CURRENT MEDICATIONS  Current Outpatient Medications:   •  metoprolol (LOPRESSOR) 25 MG Tab, Take 25 mg by mouth 2 times a day., Disp: , Rfl:   •  lisinopril (PRINIVIL) 10 MG Tab, Take 10 mg by mouth every day., Disp: , Rfl:   •  clonazePAM (KLONOPIN) 0.5 MG Tab, Take 0.5 mg by mouth 3 times a day., Disp: , Rfl:   •  QUEtiapine (SEROQUEL) 25 MG Tab, Take 50 mg by mouth every bedtime., Disp: , Rfl:   •  doxepin (SINEQUAN) 10 MG Cap, Take 10 mg by mouth every evening., Disp: , Rfl:      ALLERGIES  Allergies   Allergen Reactions   • Sulfa Drugs Vomiting   • Toradol Vomiting   • Gabapentin      Bowel incontinence     • Amoxicillin Rash     Reported by NAIDA on arrival to ED    Pt states she takes PCN 10/3       PHYSICAL EXAM  VITAL SIGNS: BP (!) 83/52   Pulse 72   Temp 36.3 °C (97.3 °F)   Resp 20   LMP 11/02/2015   SpO2 95%     Constitutional: Intoxicated, does awake. No acute distress.  HENT: Dry mucous membranes. Normocephalic, Atraumatic, Bilateral external ears normal, No oral exudates, Nose normal.   Eyes:conjunctiva is normal, there are no signs of exudate.   Neck: Supple, no meningeal signs.  Lymphatic: No lymphadenopathy noted.   Cardiovascular: Regular rate and rhythm without murmurs gallops or rubs.   Thorax & Lungs: No respiratory distress. Breathing comfortably. Lungs are clear to auscultation bilaterally, there are no wheezes no rales. Chest wall is nontender.  Abdomen: Soft, nontender,  nondistended. Bowel sounds are present.   Skin: Contusions to knees and legs. Warm, Dry, No erythema,   Back: No tenderness, No CVA tenderness.  Musculoskeletal: Good range of motion in all major joints. No tenderness to palpation or major deformities noted. Intact distal pulses, no clubbing, no cyanosis, no edema,   Neurologic: Alert & oriented x 2 to person and place, not time or events. Appears to be grossly intoxicated.  Psychiatric: Unable to assess.     LABS  Results for orders placed or performed during the hospital encounter of 09/27/19   DIAGNOSTIC ALCOHOL   Result Value Ref Range    Diagnostic Alcohol 0.31 (H) 0.00 g/dL      All labs reviewed by me.    COURSE & MEDICAL DECISION MAKING  Pertinent Labs & Imaging studies reviewed. (See chart for details)    12:53 PM - Patient seen and examined at bedside. Patient will be treated with NS infusion 1,000 mL. Ordered diagnostic alcohol to evaluate her symptoms. The differential diagnoses include but are not limited to: alcohol intoxication,      3:03 PM - Per RN, patient is awake, walking around, and felling improved.    HYDRATION: Based on the patient's presentation of Hypotension the patient was given IV fluids. IV Hydration was used because oral hydration was not adequate alone. Upon recheck following hydration, the patient was slightly improved.    Decision Making:  Patient presents emerged part for evaluation for clinically patient was sleeping and grossly intoxicated.  I did start an IV give the patient a fluid hydration because she did appear to be dehydrated with dry mucous membranes.  After the IV fluids the patient was starting to awaken.  We did give her some food and after feeding the patient she now is much more appropriate and she is more clinically sober.  At this point the patient does not require any further intervention and I do feel the patient stable for discharge.    The patient will return for new or worsening symptoms and is stable at the  time of discharge.    DISPOSITION:  Patient will be discharged home in stable condition.    FOLLOW UP:  [address] [id number] [phone]        [address] [address]  [phone]          OUTPATIENT MEDICATIONS:  Discharge Medication List as of 9/27/2019  5:12 PM            FINAL IMPRESSION  1. Alcoholic intoxication without complication (HCC)    2. Dehydration          IAshleigh (Scribe), am scribing for, and in the presence of, Israel Craig M.D..    Electronically signed by: Ashleigh Jimenez (Scribe), 9/27/2019    IIsrael M.D. personally performed the services described in this documentation, as scribed by Ashleigh Jimenez in my presence, and it is both accurate and complete. C    The note accurately reflects work and decisions made by me.  Israel Craig  9/27/2019  7:28 PM

## 2019-09-28 ENCOUNTER — HOSPITAL ENCOUNTER (EMERGENCY)
Facility: MEDICAL CENTER | Age: 59
End: 2019-09-29
Attending: EMERGENCY MEDICINE
Payer: MEDICAID

## 2019-09-28 DIAGNOSIS — F10.921 ALCOHOL INTOXICATION WITH DELIRIUM (HCC): ICD-10-CM

## 2019-09-28 LAB — POC BREATHALIZER: 0.28 PERCENT (ref 0–0.01)

## 2019-09-28 PROCEDURE — 302970 POC BREATHALIZER: Performed by: EMERGENCY MEDICINE

## 2019-09-28 PROCEDURE — 99284 EMERGENCY DEPT VISIT MOD MDM: CPT

## 2019-09-28 NOTE — ED NOTES
Pt aox4, resp equal unlabored, moving all extremities, steady gait on ambulation. Given education on alcohol consumption. Pt encouraged to follow up with pcp and return to ed for worsening symptoms.

## 2019-09-29 ENCOUNTER — HOSPITAL ENCOUNTER (EMERGENCY)
Facility: MEDICAL CENTER | Age: 59
End: 2019-09-29
Attending: EMERGENCY MEDICINE
Payer: MEDICAID

## 2019-09-29 ENCOUNTER — APPOINTMENT (OUTPATIENT)
Dept: RADIOLOGY | Facility: MEDICAL CENTER | Age: 59
DRG: 897 | End: 2019-09-29
Attending: EMERGENCY MEDICINE
Payer: MEDICAID

## 2019-09-29 ENCOUNTER — HOSPITAL ENCOUNTER (INPATIENT)
Facility: MEDICAL CENTER | Age: 59
LOS: 3 days | DRG: 897 | End: 2019-10-03
Attending: EMERGENCY MEDICINE | Admitting: INTERNAL MEDICINE
Payer: MEDICAID

## 2019-09-29 VITALS
SYSTOLIC BLOOD PRESSURE: 95 MMHG | DIASTOLIC BLOOD PRESSURE: 58 MMHG | TEMPERATURE: 97.2 F | HEART RATE: 89 BPM | OXYGEN SATURATION: 93 % | RESPIRATION RATE: 16 BRPM

## 2019-09-29 VITALS
WEIGHT: 165 LBS | RESPIRATION RATE: 12 BRPM | TEMPERATURE: 97.2 F | DIASTOLIC BLOOD PRESSURE: 92 MMHG | HEIGHT: 66 IN | BODY MASS INDEX: 26.52 KG/M2 | HEART RATE: 96 BPM | SYSTOLIC BLOOD PRESSURE: 140 MMHG

## 2019-09-29 DIAGNOSIS — E83.42 HYPOMAGNESEMIA: ICD-10-CM

## 2019-09-29 DIAGNOSIS — E87.6 HYPOKALEMIA: ICD-10-CM

## 2019-09-29 DIAGNOSIS — F10.920 ALCOHOLIC INTOXICATION WITHOUT COMPLICATION (HCC): ICD-10-CM

## 2019-09-29 DIAGNOSIS — I10 ESSENTIAL HYPERTENSION: ICD-10-CM

## 2019-09-29 DIAGNOSIS — F60.9 PERSONALITY DISORDER (HCC): ICD-10-CM

## 2019-09-29 DIAGNOSIS — E86.0 DEHYDRATION: ICD-10-CM

## 2019-09-29 DIAGNOSIS — E87.29 HIGH ANION GAP METABOLIC ACIDOSIS: ICD-10-CM

## 2019-09-29 DIAGNOSIS — F10.921 ALCOHOL INTOXICATION WITH DELIRIUM (HCC): ICD-10-CM

## 2019-09-29 DIAGNOSIS — E87.20 LACTIC ACIDOSIS: ICD-10-CM

## 2019-09-29 DIAGNOSIS — R53.81 DEBILITY: ICD-10-CM

## 2019-09-29 DIAGNOSIS — G89.4 CHRONIC PAIN SYNDROME: ICD-10-CM

## 2019-09-29 DIAGNOSIS — T07.XXXA MULTIPLE CONTUSIONS: ICD-10-CM

## 2019-09-29 LAB
ALBUMIN SERPL BCP-MCNC: 4.1 G/DL (ref 3.2–4.9)
ALBUMIN/GLOB SERPL: 1.4 G/DL
ALP SERPL-CCNC: 147 U/L (ref 30–99)
ALT SERPL-CCNC: 19 U/L (ref 2–50)
ANION GAP SERPL CALC-SCNC: 13 MMOL/L (ref 0–11.9)
APTT PPP: 23.4 SEC (ref 24.7–36)
AST SERPL-CCNC: 39 U/L (ref 12–45)
BASE EXCESS BLDV CALC-SCNC: -2 MMOL/L
BASOPHILS # BLD AUTO: 1.2 % (ref 0–1.8)
BASOPHILS # BLD: 0.07 K/UL (ref 0–0.12)
BILIRUB SERPL-MCNC: 0.2 MG/DL (ref 0.1–1.5)
BODY TEMPERATURE: ABNORMAL CENTIGRADE
BUN SERPL-MCNC: 15 MG/DL (ref 8–22)
CALCIUM SERPL-MCNC: 8.7 MG/DL (ref 8.5–10.5)
CHLORIDE SERPL-SCNC: 110 MMOL/L (ref 96–112)
CO2 SERPL-SCNC: 24 MMOL/L (ref 20–33)
CREAT SERPL-MCNC: 0.88 MG/DL (ref 0.5–1.4)
EOSINOPHIL # BLD AUTO: 0.1 K/UL (ref 0–0.51)
EOSINOPHIL NFR BLD: 1.8 % (ref 0–6.9)
ERYTHROCYTE [DISTWIDTH] IN BLOOD BY AUTOMATED COUNT: 60.5 FL (ref 35.9–50)
ETHANOL BLD-MCNC: 0.4 G/DL
GLOBULIN SER CALC-MCNC: 2.9 G/DL (ref 1.9–3.5)
GLUCOSE BLD-MCNC: 84 MG/DL (ref 65–99)
GLUCOSE SERPL-MCNC: 107 MG/DL (ref 65–99)
HCO3 BLDV-SCNC: 22 MMOL/L (ref 24–28)
HCT VFR BLD AUTO: 36.3 % (ref 37–47)
HGB BLD-MCNC: 11.7 G/DL (ref 12–16)
IMM GRANULOCYTES # BLD AUTO: 0.01 K/UL (ref 0–0.11)
IMM GRANULOCYTES NFR BLD AUTO: 0.2 % (ref 0–0.9)
INR PPP: 0.95 (ref 0.87–1.13)
LACTATE BLD-SCNC: 3.1 MMOL/L (ref 0.5–2)
LIPASE SERPL-CCNC: 72 U/L (ref 11–82)
LYMPHOCYTES # BLD AUTO: 1.86 K/UL (ref 1–4.8)
LYMPHOCYTES NFR BLD: 32.7 % (ref 22–41)
MAGNESIUM SERPL-MCNC: 1.4 MG/DL (ref 1.5–2.5)
MCH RBC QN AUTO: 30.5 PG (ref 27–33)
MCHC RBC AUTO-ENTMCNC: 32.2 G/DL (ref 33.6–35)
MCV RBC AUTO: 94.8 FL (ref 81.4–97.8)
MONOCYTES # BLD AUTO: 0.37 K/UL (ref 0–0.85)
MONOCYTES NFR BLD AUTO: 6.5 % (ref 0–13.4)
NEUTROPHILS # BLD AUTO: 3.27 K/UL (ref 2–7.15)
NEUTROPHILS NFR BLD: 57.6 % (ref 44–72)
NRBC # BLD AUTO: 0 K/UL
NRBC BLD-RTO: 0 /100 WBC
PCO2 BLDV: 34.4 MMHG (ref 41–51)
PH BLDV: 7.42 [PH] (ref 7.31–7.45)
PLATELET # BLD AUTO: 242 K/UL (ref 164–446)
PMV BLD AUTO: 9.6 FL (ref 9–12.9)
PO2 BLDV: 43.6 MMHG (ref 25–40)
POTASSIUM SERPL-SCNC: 3.5 MMOL/L (ref 3.6–5.5)
PROT SERPL-MCNC: 7 G/DL (ref 6–8.2)
PROTHROMBIN TIME: 12.9 SEC (ref 12–14.6)
RBC # BLD AUTO: 3.83 M/UL (ref 4.2–5.4)
SAO2 % BLDV: 74.5 %
SODIUM SERPL-SCNC: 147 MMOL/L (ref 135–145)
WBC # BLD AUTO: 5.7 K/UL (ref 4.8–10.8)

## 2019-09-29 PROCEDURE — 85025 COMPLETE CBC W/AUTO DIFF WBC: CPT

## 2019-09-29 PROCEDURE — 71045 X-RAY EXAM CHEST 1 VIEW: CPT

## 2019-09-29 PROCEDURE — 96366 THER/PROPH/DIAG IV INF ADDON: CPT

## 2019-09-29 PROCEDURE — 700101 HCHG RX REV CODE 250: Performed by: EMERGENCY MEDICINE

## 2019-09-29 PROCEDURE — 83690 ASSAY OF LIPASE: CPT

## 2019-09-29 PROCEDURE — 72170 X-RAY EXAM OF PELVIS: CPT

## 2019-09-29 PROCEDURE — 80307 DRUG TEST PRSMV CHEM ANLYZR: CPT

## 2019-09-29 PROCEDURE — 99220 PR INITIAL OBSERVATION CARE,LEVL III: CPT | Mod: GC | Performed by: INTERNAL MEDICINE

## 2019-09-29 PROCEDURE — 70450 CT HEAD/BRAIN W/O DYE: CPT

## 2019-09-29 PROCEDURE — 72125 CT NECK SPINE W/O DYE: CPT

## 2019-09-29 PROCEDURE — 99285 EMERGENCY DEPT VISIT HI MDM: CPT

## 2019-09-29 PROCEDURE — 96365 THER/PROPH/DIAG IV INF INIT: CPT

## 2019-09-29 PROCEDURE — HZ2ZZZZ DETOXIFICATION SERVICES FOR SUBSTANCE ABUSE TREATMENT: ICD-10-PCS | Performed by: INTERNAL MEDICINE

## 2019-09-29 PROCEDURE — 83735 ASSAY OF MAGNESIUM: CPT

## 2019-09-29 PROCEDURE — 82803 BLOOD GASES ANY COMBINATION: CPT

## 2019-09-29 PROCEDURE — 85730 THROMBOPLASTIN TIME PARTIAL: CPT

## 2019-09-29 PROCEDURE — G0378 HOSPITAL OBSERVATION PER HR: HCPCS

## 2019-09-29 PROCEDURE — 85610 PROTHROMBIN TIME: CPT

## 2019-09-29 PROCEDURE — 82962 GLUCOSE BLOOD TEST: CPT

## 2019-09-29 PROCEDURE — 80053 COMPREHEN METABOLIC PANEL: CPT

## 2019-09-29 PROCEDURE — 99284 EMERGENCY DEPT VISIT MOD MDM: CPT

## 2019-09-29 PROCEDURE — 83605 ASSAY OF LACTIC ACID: CPT

## 2019-09-29 RX ORDER — LORAZEPAM 2 MG/1
4 TABLET ORAL
Status: DISCONTINUED | OUTPATIENT
Start: 2019-09-29 | End: 2019-10-03 | Stop reason: HOSPADM

## 2019-09-29 RX ORDER — LORAZEPAM 1 MG/1
1 TABLET ORAL EVERY 4 HOURS PRN
Status: DISCONTINUED | OUTPATIENT
Start: 2019-09-29 | End: 2019-10-03 | Stop reason: HOSPADM

## 2019-09-29 RX ORDER — THIAMINE MONONITRATE (VIT B1) 100 MG
100 TABLET ORAL DAILY
Status: COMPLETED | OUTPATIENT
Start: 2019-09-30 | End: 2019-10-03

## 2019-09-29 RX ORDER — LABETALOL 200 MG/1
200 TABLET, FILM COATED ORAL EVERY 6 HOURS PRN
Status: DISCONTINUED | OUTPATIENT
Start: 2019-09-29 | End: 2019-10-02

## 2019-09-29 RX ORDER — BISACODYL 10 MG
10 SUPPOSITORY, RECTAL RECTAL
Status: DISCONTINUED | OUTPATIENT
Start: 2019-09-29 | End: 2019-10-03 | Stop reason: HOSPADM

## 2019-09-29 RX ORDER — LORAZEPAM 2 MG/ML
0.5 INJECTION INTRAMUSCULAR EVERY 4 HOURS PRN
Status: DISCONTINUED | OUTPATIENT
Start: 2019-09-29 | End: 2019-10-03 | Stop reason: HOSPADM

## 2019-09-29 RX ORDER — POLYETHYLENE GLYCOL 3350 17 G/17G
1 POWDER, FOR SOLUTION ORAL
Status: DISCONTINUED | OUTPATIENT
Start: 2019-09-29 | End: 2019-10-03 | Stop reason: HOSPADM

## 2019-09-29 RX ORDER — LORAZEPAM 2 MG/1
2 TABLET ORAL
Status: DISCONTINUED | OUTPATIENT
Start: 2019-09-29 | End: 2019-10-03 | Stop reason: HOSPADM

## 2019-09-29 RX ORDER — LORAZEPAM 2 MG/ML
1.5 INJECTION INTRAMUSCULAR
Status: DISCONTINUED | OUTPATIENT
Start: 2019-09-29 | End: 2019-10-03 | Stop reason: HOSPADM

## 2019-09-29 RX ORDER — LABETALOL HYDROCHLORIDE 5 MG/ML
10 INJECTION, SOLUTION INTRAVENOUS
Status: DISCONTINUED | OUTPATIENT
Start: 2019-09-29 | End: 2019-10-02

## 2019-09-29 RX ORDER — FOLIC ACID 1 MG/1
1 TABLET ORAL DAILY
Status: COMPLETED | OUTPATIENT
Start: 2019-09-30 | End: 2019-10-03

## 2019-09-29 RX ORDER — LORAZEPAM 2 MG/ML
2 INJECTION INTRAMUSCULAR
Status: DISCONTINUED | OUTPATIENT
Start: 2019-09-29 | End: 2019-10-03 | Stop reason: HOSPADM

## 2019-09-29 RX ORDER — AMOXICILLIN 250 MG
2 CAPSULE ORAL 2 TIMES DAILY
Status: DISCONTINUED | OUTPATIENT
Start: 2019-09-29 | End: 2019-10-03 | Stop reason: HOSPADM

## 2019-09-29 RX ORDER — LORAZEPAM 2 MG/ML
1 INJECTION INTRAMUSCULAR
Status: DISCONTINUED | OUTPATIENT
Start: 2019-09-29 | End: 2019-10-03 | Stop reason: HOSPADM

## 2019-09-29 RX ORDER — LORAZEPAM 0.5 MG/1
0.5 TABLET ORAL EVERY 4 HOURS PRN
Status: DISCONTINUED | OUTPATIENT
Start: 2019-09-29 | End: 2019-10-03 | Stop reason: HOSPADM

## 2019-09-29 RX ORDER — HEPARIN SODIUM 5000 [USP'U]/ML
5000 INJECTION, SOLUTION INTRAVENOUS; SUBCUTANEOUS EVERY 12 HOURS
Status: DISCONTINUED | OUTPATIENT
Start: 2019-09-29 | End: 2019-10-03 | Stop reason: HOSPADM

## 2019-09-29 RX ADMIN — THIAMINE HYDROCHLORIDE: 100 INJECTION, SOLUTION INTRAMUSCULAR; INTRAVENOUS at 21:30

## 2019-09-29 ASSESSMENT — LIFESTYLE VARIABLES
HAVE YOU EVER FELT YOU SHOULD CUT DOWN ON YOUR DRINKING: NO
TOTAL SCORE: 0
DOES PATIENT WANT TO STOP DRINKING: NO
DO YOU DRINK ALCOHOL: YES
HAVE PEOPLE ANNOYED YOU BY CRITICIZING YOUR DRINKING: NO
EVER HAD A DRINK FIRST THING IN THE MORNING TO STEADY YOUR NERVES TO GET RID OF A HANGOVER: NO
CONSUMPTION TOTAL: INCOMPLETE
TOTAL SCORE: 0
TOTAL SCORE: 0
EVER FELT BAD OR GUILTY ABOUT YOUR DRINKING: NO

## 2019-09-29 NOTE — ED NOTES
Pt unable to participate effectively in POC breathalyzer. ERP called, new order for lab draw received. RN unable to find suitable PIV/lab draw site. Phlebotomy paged for lab draw.

## 2019-09-29 NOTE — ED NOTES
Pt resting quietly, arousable to loud verbal and light physical stimuli. Pt with slurred speech, drifting off to sleep in middle of conversation.

## 2019-09-29 NOTE — ED NOTES
[place] Per [place], pt found passed out in the back of [place] bus station. Pt responds to painful stimuli- pt refusing to answer triage or assessment questions. Pt smells like ETOH.

## 2019-09-29 NOTE — ED PROVIDER NOTES
ED Provider  Scribed for López Stiles D.O. by Kim Anand. 9/28/2019  7:05 PM    Means of arrival:Ambulance  History obtained from:Nursing staff  History limited by: Secondary to mental status     CHIEF COMPLAINT  Chief Complaint   Patient presents with   • Alcohol Intoxication     admits to ETOH use. [place]. did not want to go to long-term.       HPI  Ashleigh Marquis is a 58 y.o. female who presents to the ED via ambulance for alcohol intoxication. Per the nursing staff, the patient was in the ED for the same situation yesterday. She is reported to have been found in a bush prior to arrival and did not want to go to long-term.    HPI limited secondary to patient's mental status.     REVIEW OF SYSTEMS  See HPI for further details.   ROS limited secondary to patient's mental status.     PAST MEDICAL HISTORY   has a past medical history of Alcoholism (HCC), Anxiety, Arthritis, ASTHMA, Cancer (HCC) (1981), Chronic low back pain, Congestive heart failure (HCC), Depression, EtOH dependence (HCC), Fall, GERD (gastroesophageal reflux disease), HTN, Hypertension, Indigestion, Muscle disorder, OSTEOPOROSIS, Other specified symptom associated with female genital organs, Psychiatric disorder, PTSD (post-traumatic stress disorder), Renal disorder, Seizure (HCC), Ulcer, and Vitamin D deficiency.    SOCIAL HISTORY  Social History     Tobacco Use   • Smoking status: Current Every Day Smoker     Packs/day: 0.50     Years: 20.00     Pack years: 10.00     Types: Cigarettes   • Smokeless tobacco: Never Used   • Tobacco comment: 1/2 pack per day   Substance and Sexual Activity   • Alcohol use: Yes     Comment: vodka, heavy use   • Drug use: No   • Sexual activity: Never     Partners: Male       SURGICAL HISTORY   has a past surgical history that includes hernia repair (1977); cysto stent placemnt pre surg (10/7/2010); cystoscopy stent placement (11/9/2010); ureteroscopy (11/9/2010); lasertripsy (11/9/2010); stent removal  (11/9/2010); and gastroscopy-endo (N/A, 10/3/2018).    CURRENT MEDICATIONS  Home Medications     Reviewed by Annetta Hampton R.N. (Registered Nurse) on 09/28/19 at 1855  Med List Status: Unable to Obtain   Medication Last Dose Status   clonazePAM (KLONOPIN) 0.5 MG Tab  Active   doxepin (SINEQUAN) 10 MG Cap  Active   lisinopril (PRINIVIL) 10 MG Tab  Active   metoprolol (LOPRESSOR) 25 MG Tab  Active   QUEtiapine (SEROQUEL) 25 MG Tab  Active                ALLERGIES  Allergies   Allergen Reactions   • Sulfa Drugs Vomiting   • Toradol Vomiting   • Gabapentin      Bowel incontinence     • Amoxicillin Rash     Reported by NAIDA on arrival to ED    Pt states she takes PCN 10/3       PHYSICAL EXAM  VITAL SIGNS: /77   Pulse 89   Temp 36.2 °C (97.2 °F) (Temporal)   Resp 16   LMP 11/02/2015   SpO2 94%   Constitutional: Sleeping, but easily aroused.  HENT: No signs of trauma, mucous membranes are moist  Eyes: Conjunctiva normal, Non-icteric.   Neck: Normal range of motion, No tenderness, Supple.  Cardiovascular: Regular rate and rhythm, no murmurs.   Thorax & Lungs: Normal breath sounds, No respiratory distress, No wheezing, No chest tenderness.   Abdomen: Bowel sounds normal, Soft, No tenderness, No masses, No pulsatile masses. No peritoneal signs.  Skin: Warm, Dry, normal color.   Extremities: No edema, No tenderness, No cyanosis  Musculoskeletal: Good range of motion in all major joints. No tenderness to palpation or major deformities noted.   Neurologic: Sleeping, but is easily aroused. Speech slurred and slow.  Not answering questions directly. Spontaneous movement of all extremities.  Psychiatric: Affect normal, Judgment normal, Mood normal.     MEDICAL DECISION MAKING  This is a 58 y.o. female who presents history of alcohol problems.  She was seen here yesterday for similar symptoms.  Her speech is slurred, her gait is unsteady.  There is no focal lateralizing symptoms no concern for a trauma, or stroke.   Alcohol levels 0.227 which is consistent with significant alcohol intoxication.  Patient is being allowed to sleep here until she is clinically sober at which time she is stable for discharge home.    DIAGNOSTIC STUDIES / PROCEDURES    LABS  Results for orders placed or performed during the hospital encounter of 09/28/19   POC BREATHALIZER   Result Value Ref Range    POC Breathalizer 0.277 (A) 0.00 - 0.01 Percent       All labs reviewed by me.    COURSE  Pertinent Labs & Imaging studies reviewed. (See chart for details)    7:05 PM - Patient seen and examined at bedside. Discussed plan of care. Ordered for POC breathalizer to evaluate her symptoms.         FINAL IMPRESSION  1. Alcohol intoxication with delirium (HCC)         Kim GAN (Scribe), am scribing for, and in the presence of, López Stiles D.O..    Electronically signed by: Kim Anand (Scribe), 9/28/2019    López GAN D.O. personally performed the services described in this documentation, as scribed by Kim Anand in my presence, and it is both accurate and complete.       C    The note accurately reflects work and decisions made by me.  López Stiles  9/28/2019  9:50 PM

## 2019-09-29 NOTE — ED TRIAGE NOTES
"Pt was found in bushes by EMS. Pt admits to drinking \"a lot\" of vodka. PD poured the remaining amount from bottle. Pt ambulatory on scene. Pt states \"I do not want to go to [facility].\" pt given option of [facility] or hospital and asked to go to hospital. GCS 14 on arrival and AOX3 but slightly lethargic.   "

## 2019-09-29 NOTE — DISCHARGE INSTRUCTIONS
Please contact the community clinic listed above for a follow-up appointment later this week.  Return to the emergency department if you develop any new or worsening symptoms including agitation, seizures, nausea or vomiting, headaches, inability to tolerate food or fluids, or if you have any further concerns.

## 2019-09-29 NOTE — ED NOTES
Pt spontaneously awake, dressed self, ambulatory with steady gait. Refusing AVS. Refusing assistance obtaining MTM. Refusing rehab information.

## 2019-09-29 NOTE — ED NOTES
Per Officer Isabel, Pt ambulated out of ER with steady gait, no slurred speech. MD made aware of situation   normal... well appearing, well nourished, and in no apparent distress.

## 2019-09-29 NOTE — ED TRIAGE NOTES
[place] Per EMS, pt found passed out at bus station- ETOH. Pt responds to painful stimuli

## 2019-09-29 NOTE — ED NOTES
Patient resting in bed, NAD. Respirations even and unlabored. Call light in reach. Will continue to monitor

## 2019-09-29 NOTE — ED PROVIDER NOTES
"ED Provider Note    Chief Complaint:   Alcohol intoxication    HPI:  Ashleigh Marquis is a 58 y.o. female who presents to the emergency department brought by EMS with reported alcohol intoxication.  She has been seen in this emergency department multiple times over the past few days with identical presentations.  Today, paramedics report that she was found sleeping at a bus stop, she was not able to ambulate away under her own power, she was brought to the emergency department for further evaluation.  On my initial assessment, she does have mildly slurred speech but is responsive, she is answering questions.  She states she has been drinking today, though will not tell me the amount nor when her most recent drink was.  She simply states \"I got f**ed up\" and is requesting that we leave her alone so she can sleep.  She does not have any complaint of pain nor injury at this time.  She has no obvious evidence of trauma.  Paramedics report no history of trauma.    Further HPI is limited by the patient's clinical condition.    Review of Systems:  See HPI for pertinent positives and negatives.  Further ROS is limited by the patient's clinical condition.    Past Medical History:   has a past medical history of Alcoholism (HCC), Anxiety, Arthritis, ASTHMA, Cancer (HCC) (1981), Chronic low back pain, Congestive heart failure (HCC), Depression, EtOH dependence (HCC), Fall, GERD (gastroesophageal reflux disease), HTN, Hypertension, Indigestion, Muscle disorder, OSTEOPOROSIS, Other specified symptom associated with female genital organs, Psychiatric disorder, PTSD (post-traumatic stress disorder), Renal disorder, Seizure ([facility]), Ulcer, and Vitamin D deficiency.    Social History:  Social History     Tobacco Use   • Smoking status: Current Every Day Smoker     Packs/day: 0.50     Years: 20.00     Pack years: 10.00     Types: Cigarettes   • Smokeless tobacco: Never Used   • Tobacco comment: 1/2 pack per day   Substance and " "Sexual Activity   • Alcohol use: Yes     Comment: vodka, heavy use   • Drug use: No   • Sexual activity: Never     Partners: Male       Surgical History:   has a past surgical history that includes hernia repair (1977); cysto stent placemnt pre surg (10/7/2010); cystoscopy stent placement (11/9/2010); ureteroscopy (11/9/2010); lasertripsy (11/9/2010); stent removal (11/9/2010); and gastroscopy-endo (N/A, 10/3/2018).    Current Medications:  Home Medications     Reviewed by Lenore Horowitz R.N. (Registered Nurse) on 09/29/19 at 0900  Med List Status: Partial   Medication Last Dose Status   clonazePAM (KLONOPIN) 0.5 MG Tab  Active   doxepin (SINEQUAN) 10 MG Cap  Active   lisinopril (PRINIVIL) 10 MG Tab  Active   metoprolol (LOPRESSOR) 25 MG Tab  Active   QUEtiapine (SEROQUEL) 25 MG Tab  Active                Allergies:  Allergies   Allergen Reactions   • Sulfa Drugs Vomiting   • Toradol Vomiting   • Gabapentin      Bowel incontinence     • Amoxicillin Rash     Reported by NAIDA on arrival to ED    Pt states she takes PCN 10/3       Physical Exam:  Vital Signs: /92   Pulse 96   Temp 36.2 °C (97.2 °F) (Temporal)   Resp 12   Ht 1.676 m (5' 6\")   Wt 74.8 kg (165 lb)   LMP 11/02/2015   BMI 26.63 kg/m²   Constitutional: Drowsy, spots of  HENT: Atraumatic  Eyes: No gaze deficit  Neck: Supple, normal range of motion, no stridor  Cardiovascular: Extremities are warm and well perfused, no murmur appreciated, normal cardiac auscultation  Pulmonary: No respiratory distress, normal work of breathing, no accessory muscule usage, breath sounds clear and equal bilaterally  Abdomen: Soft, non-tender to palpation, no peritoneal signs  Skin: No lacerations nor abrasions visible  Musculoskeletal: No significant swelling or deformity noted in upper and lower extremities  Neurologic: Drowsy, responsive, will follow commands, moves all extremities, does not participate in full neurologic exam, mildly slurred speech  Psychiatric: " Unable to fully assess due to drowsiness and reported alcohol use, history of alcohol abuse    Medical records reviewed for continuity of care.  Patient was seen in this emergency department yesterday for alcohol intoxication.  She is reported to been found in a bush prior to arrival, and was given the option of going to the emergency department or going to intermediate.  She elected to go to the emergency department.  Alcohol level was 0.227 on arrival, consistent with her clinical presentation.  She was observed until she achieved clinical sobriety and was discharged home.    Labs:  Labs Reviewed   ACCU-CHEK GLUCOSE       ED Medications Administered:  Medications - No data to display    MDM:  History and physical exam as documented above.  Patient presents brought by paramedics who report that she had been drinking heavily.  She states that she has been drinking as well.  Her clinical presentation is consistent with alcohol intoxication.  She has no visible evidence of trauma, she reports no history of trauma, paramedics report no suspicion for trauma.    At this time, she is refusing breathalyzer and lab draw for diagnostic alcohol.  Accu-Chek is within normal limits.     On my initial reassessment, she does appear to be clearing appropriately.  I was later notified by nursing staff that she got up and walked out of the emergency department.  At that time, she spoke with hospital staff, she had normal speech, getting normal steady gait, she does not appear to have any residual clinical signs of intoxication.  She eloped prior to receiving any discharge information.    Disposition:  Eloped in stable condition    Final Impression:  1. Alcoholic intoxication without complication (HCC)        Electronically signed by: Alicia Alvarado, 9/29/2019 5:28 PM

## 2019-09-29 NOTE — ED NOTES
Add 91577 Cpt? (Important Note: In 2017 The Use Of 99145 Is Being Tracked By Cms To Determine Future Global Period Reimbursement For Global Periods): yes After 2x lab draw attempts by phlebotomy pt alert enough to participate in breathalyzer with promise of food after breathalyzer. 0.277 on breathalyzer. Pt alert, sitting up, eating, in NAD.   Detail Level: Detailed Additional Comments: Overlying Dermabond removed

## 2019-09-29 NOTE — ED NOTES
Pt reporting episode of urinary incontinence. Linens changed, pants removed. Pt in NAD.  Phlebotomy at bedside for lab draw.

## 2019-09-30 PROBLEM — E87.29 HIGH ANION GAP METABOLIC ACIDOSIS: Status: ACTIVE | Noted: 2019-09-30

## 2019-09-30 PROBLEM — E87.20 LACTIC ACIDOSIS: Status: ACTIVE | Noted: 2019-09-30

## 2019-09-30 PROBLEM — Z72.0 TOBACCO USE: Status: ACTIVE | Noted: 2019-09-30

## 2019-09-30 LAB
AMORPH CRY #/AREA URNS HPF: PRESENT /HPF
AMPHET UR QL SCN: NEGATIVE
ANION GAP SERPL CALC-SCNC: 10 MMOL/L (ref 0–11.9)
APPEARANCE UR: ABNORMAL
BACTERIA #/AREA URNS HPF: NEGATIVE /HPF
BARBITURATES UR QL SCN: NEGATIVE
BASOPHILS # BLD AUTO: 0.8 % (ref 0–1.8)
BASOPHILS # BLD: 0.04 K/UL (ref 0–0.12)
BENZODIAZ UR QL SCN: POSITIVE
BILIRUB UR QL STRIP.AUTO: NEGATIVE
BUN SERPL-MCNC: 14 MG/DL (ref 8–22)
BZE UR QL SCN: NEGATIVE
CALCIUM SERPL-MCNC: 8.3 MG/DL (ref 8.5–10.5)
CANNABINOIDS UR QL SCN: NEGATIVE
CHLORIDE SERPL-SCNC: 109 MMOL/L (ref 96–112)
CO2 SERPL-SCNC: 24 MMOL/L (ref 20–33)
COLOR UR: YELLOW
CREAT SERPL-MCNC: 0.62 MG/DL (ref 0.5–1.4)
EOSINOPHIL # BLD AUTO: 0.1 K/UL (ref 0–0.51)
EOSINOPHIL NFR BLD: 2 % (ref 0–6.9)
EPI CELLS #/AREA URNS HPF: NEGATIVE /HPF
ERYTHROCYTE [DISTWIDTH] IN BLOOD BY AUTOMATED COUNT: 61.4 FL (ref 35.9–50)
GLUCOSE SERPL-MCNC: 82 MG/DL (ref 65–99)
GLUCOSE UR STRIP.AUTO-MCNC: 250 MG/DL
HCT VFR BLD AUTO: 32.2 % (ref 37–47)
HGB BLD-MCNC: 10.3 G/DL (ref 12–16)
HYALINE CASTS #/AREA URNS LPF: NORMAL /LPF
IMM GRANULOCYTES # BLD AUTO: 0.01 K/UL (ref 0–0.11)
IMM GRANULOCYTES NFR BLD AUTO: 0.2 % (ref 0–0.9)
KETONES UR STRIP.AUTO-MCNC: NEGATIVE MG/DL
LACTATE BLD-SCNC: 1.2 MMOL/L (ref 0.5–2)
LACTATE BLD-SCNC: 1.3 MMOL/L (ref 0.5–2)
LACTATE BLD-SCNC: 1.8 MMOL/L (ref 0.5–2)
LACTATE BLD-SCNC: 2.8 MMOL/L (ref 0.5–2)
LEUKOCYTE ESTERASE UR QL STRIP.AUTO: NEGATIVE
LYMPHOCYTES # BLD AUTO: 1.91 K/UL (ref 1–4.8)
LYMPHOCYTES NFR BLD: 38 % (ref 22–41)
MAGNESIUM SERPL-MCNC: 1.4 MG/DL (ref 1.5–2.5)
MAGNESIUM SERPL-MCNC: 2.1 MG/DL (ref 1.5–2.5)
MCH RBC QN AUTO: 30.3 PG (ref 27–33)
MCHC RBC AUTO-ENTMCNC: 32 G/DL (ref 33.6–35)
MCV RBC AUTO: 94.7 FL (ref 81.4–97.8)
METHADONE UR QL SCN: NEGATIVE
MICRO URNS: ABNORMAL
MONOCYTES # BLD AUTO: 0.43 K/UL (ref 0–0.85)
MONOCYTES NFR BLD AUTO: 8.5 % (ref 0–13.4)
NEUTROPHILS # BLD AUTO: 2.54 K/UL (ref 2–7.15)
NEUTROPHILS NFR BLD: 50.5 % (ref 44–72)
NITRITE UR QL STRIP.AUTO: NEGATIVE
NRBC # BLD AUTO: 0 K/UL
NRBC BLD-RTO: 0 /100 WBC
OPIATES UR QL SCN: NEGATIVE
OXYCODONE UR QL SCN: NEGATIVE
PCP UR QL SCN: NEGATIVE
PH UR STRIP.AUTO: 6 [PH] (ref 5–8)
PHOSPHATE SERPL-MCNC: 3.1 MG/DL (ref 2.5–4.5)
PLATELET # BLD AUTO: 199 K/UL (ref 164–446)
PMV BLD AUTO: 10.8 FL (ref 9–12.9)
POTASSIUM SERPL-SCNC: 3.5 MMOL/L (ref 3.6–5.5)
PROPOXYPH UR QL SCN: NEGATIVE
PROT UR QL STRIP: NEGATIVE MG/DL
RBC # BLD AUTO: 3.4 M/UL (ref 4.2–5.4)
RBC # URNS HPF: NORMAL /HPF
RBC UR QL AUTO: NEGATIVE
SODIUM SERPL-SCNC: 143 MMOL/L (ref 135–145)
SP GR UR STRIP.AUTO: 1.02
UROBILINOGEN UR STRIP.AUTO-MCNC: 0.2 MG/DL
WBC # BLD AUTO: 5 K/UL (ref 4.8–10.8)
WBC #/AREA URNS HPF: NORMAL /HPF

## 2019-09-30 PROCEDURE — 83735 ASSAY OF MAGNESIUM: CPT

## 2019-09-30 PROCEDURE — 80048 BASIC METABOLIC PNL TOTAL CA: CPT

## 2019-09-30 PROCEDURE — 700111 HCHG RX REV CODE 636 W/ 250 OVERRIDE (IP): Performed by: STUDENT IN AN ORGANIZED HEALTH CARE EDUCATION/TRAINING PROGRAM

## 2019-09-30 PROCEDURE — 80307 DRUG TEST PRSMV CHEM ANLYZR: CPT

## 2019-09-30 PROCEDURE — 96375 TX/PRO/DX INJ NEW DRUG ADDON: CPT

## 2019-09-30 PROCEDURE — 81001 URINALYSIS AUTO W/SCOPE: CPT

## 2019-09-30 PROCEDURE — 96367 TX/PROPH/DG ADDL SEQ IV INF: CPT

## 2019-09-30 PROCEDURE — 84100 ASSAY OF PHOSPHORUS: CPT

## 2019-09-30 PROCEDURE — 770001 HCHG ROOM/CARE - MED/SURG/GYN PRIV*

## 2019-09-30 PROCEDURE — 700102 HCHG RX REV CODE 250 W/ 637 OVERRIDE(OP): Performed by: STUDENT IN AN ORGANIZED HEALTH CARE EDUCATION/TRAINING PROGRAM

## 2019-09-30 PROCEDURE — 36415 COLL VENOUS BLD VENIPUNCTURE: CPT

## 2019-09-30 PROCEDURE — A9270 NON-COVERED ITEM OR SERVICE: HCPCS | Performed by: STUDENT IN AN ORGANIZED HEALTH CARE EDUCATION/TRAINING PROGRAM

## 2019-09-30 PROCEDURE — 96376 TX/PRO/DX INJ SAME DRUG ADON: CPT

## 2019-09-30 PROCEDURE — 700105 HCHG RX REV CODE 258: Performed by: STUDENT IN AN ORGANIZED HEALTH CARE EDUCATION/TRAINING PROGRAM

## 2019-09-30 PROCEDURE — 96366 THER/PROPH/DIAG IV INF ADDON: CPT

## 2019-09-30 PROCEDURE — 96372 THER/PROPH/DIAG INJ SC/IM: CPT

## 2019-09-30 PROCEDURE — 85025 COMPLETE CBC W/AUTO DIFF WBC: CPT

## 2019-09-30 PROCEDURE — 83605 ASSAY OF LACTIC ACID: CPT | Mod: 91

## 2019-09-30 RX ORDER — ACETAMINOPHEN 325 MG/1
650 TABLET ORAL EVERY 4 HOURS PRN
Status: DISCONTINUED | OUTPATIENT
Start: 2019-09-30 | End: 2019-10-03 | Stop reason: HOSPADM

## 2019-09-30 RX ORDER — CHLORDIAZEPOXIDE HYDROCHLORIDE 25 MG/1
25 CAPSULE, GELATIN COATED ORAL EVERY 6 HOURS
Status: COMPLETED | OUTPATIENT
Start: 2019-09-30 | End: 2019-09-30

## 2019-09-30 RX ORDER — OMEPRAZOLE 20 MG/1
20 CAPSULE, DELAYED RELEASE ORAL DAILY
Status: COMPLETED | OUTPATIENT
Start: 2019-09-30 | End: 2019-10-03

## 2019-09-30 RX ORDER — DOXEPIN HYDROCHLORIDE 10 MG/1
10 CAPSULE ORAL NIGHTLY
Status: DISCONTINUED | OUTPATIENT
Start: 2019-09-30 | End: 2019-10-03 | Stop reason: HOSPADM

## 2019-09-30 RX ORDER — QUETIAPINE FUMARATE 25 MG/1
50 TABLET, FILM COATED ORAL
Status: DISCONTINUED | OUTPATIENT
Start: 2019-09-30 | End: 2019-10-03 | Stop reason: HOSPADM

## 2019-09-30 RX ORDER — POTASSIUM CHLORIDE 20 MEQ/1
40 TABLET, EXTENDED RELEASE ORAL ONCE
Status: COMPLETED | OUTPATIENT
Start: 2019-09-30 | End: 2019-09-30

## 2019-09-30 RX ORDER — SODIUM CHLORIDE 9 MG/ML
INJECTION, SOLUTION INTRAVENOUS CONTINUOUS
Status: DISCONTINUED | OUTPATIENT
Start: 2019-09-30 | End: 2019-10-01

## 2019-09-30 RX ORDER — LISINOPRIL 10 MG/1
10 TABLET ORAL DAILY
Status: DISCONTINUED | OUTPATIENT
Start: 2019-09-30 | End: 2019-10-03 | Stop reason: HOSPADM

## 2019-09-30 RX ORDER — MAGNESIUM SULFATE HEPTAHYDRATE 40 MG/ML
4 INJECTION, SOLUTION INTRAVENOUS ONCE
Status: COMPLETED | OUTPATIENT
Start: 2019-09-30 | End: 2019-09-30

## 2019-09-30 RX ADMIN — Medication 100 MG: at 04:59

## 2019-09-30 RX ADMIN — LORAZEPAM 1 MG: 1 TABLET ORAL at 10:22

## 2019-09-30 RX ADMIN — LORAZEPAM 2 MG: 2 TABLET ORAL at 17:49

## 2019-09-30 RX ADMIN — DOXEPIN HYDROCHLORIDE 10 MG: 10 CAPSULE ORAL at 21:04

## 2019-09-30 RX ADMIN — CHLORDIAZEPOXIDE HYDROCHLORIDE 25 MG: 25 CAPSULE ORAL at 18:31

## 2019-09-30 RX ADMIN — QUETIAPINE FUMARATE 50 MG: 25 TABLET ORAL at 21:04

## 2019-09-30 RX ADMIN — METOPROLOL TARTRATE 25 MG: 25 TABLET, FILM COATED ORAL at 04:59

## 2019-09-30 RX ADMIN — ACETAMINOPHEN 650 MG: 325 TABLET, FILM COATED ORAL at 14:10

## 2019-09-30 RX ADMIN — SODIUM CHLORIDE: 9 INJECTION, SOLUTION INTRAVENOUS at 08:05

## 2019-09-30 RX ADMIN — LISINOPRIL 10 MG: 10 TABLET ORAL at 04:59

## 2019-09-30 RX ADMIN — SODIUM CHLORIDE: 9 INJECTION, SOLUTION INTRAVENOUS at 21:07

## 2019-09-30 RX ADMIN — THERA TABS 1 TABLET: TAB at 04:59

## 2019-09-30 RX ADMIN — HEPARIN SODIUM 5000 UNITS: 5000 INJECTION INTRAVENOUS; SUBCUTANEOUS at 18:31

## 2019-09-30 RX ADMIN — MAGNESIUM SULFATE IN WATER 4 G: 40 INJECTION, SOLUTION INTRAVENOUS at 08:04

## 2019-09-30 RX ADMIN — CHLORDIAZEPOXIDE HYDROCHLORIDE 25 MG: 25 CAPSULE ORAL at 14:06

## 2019-09-30 RX ADMIN — LORAZEPAM 1.5 MG: 2 INJECTION INTRAMUSCULAR; INTRAVENOUS at 05:12

## 2019-09-30 RX ADMIN — HEPARIN SODIUM 5000 UNITS: 5000 INJECTION INTRAVENOUS; SUBCUTANEOUS at 06:00

## 2019-09-30 RX ADMIN — LORAZEPAM 1.5 MG: 2 INJECTION INTRAMUSCULAR; INTRAVENOUS at 03:06

## 2019-09-30 RX ADMIN — METOPROLOL TARTRATE 25 MG: 25 TABLET, FILM COATED ORAL at 18:31

## 2019-09-30 RX ADMIN — LORAZEPAM 2 MG: 2 TABLET ORAL at 02:16

## 2019-09-30 RX ADMIN — FOLIC ACID 1 MG: 1 TABLET ORAL at 04:59

## 2019-09-30 RX ADMIN — POTASSIUM CHLORIDE 40 MEQ: 1500 TABLET, EXTENDED RELEASE ORAL at 04:58

## 2019-09-30 RX ADMIN — ACETAMINOPHEN 650 MG: 325 TABLET, FILM COATED ORAL at 21:12

## 2019-09-30 RX ADMIN — LORAZEPAM 1 MG: 1 TABLET ORAL at 14:11

## 2019-09-30 RX ADMIN — OMEPRAZOLE 20 MG: 20 CAPSULE, DELAYED RELEASE ORAL at 18:32

## 2019-09-30 RX ADMIN — LORAZEPAM 3 MG: 1 TABLET ORAL at 21:04

## 2019-09-30 ASSESSMENT — LIFESTYLE VARIABLES
PAROXYSMAL SWEATS: BARELY PERCEPTIBLE SWEATING. PALMS MOIST
AGITATION: *
EVER HAD A DRINK FIRST THING IN THE MORNING TO STEADY YOUR NERVES TO GET RID OF A HANGOVER: YES
TOTAL SCORE: 8
TREMOR: *
HEADACHE, FULLNESS IN HEAD: NOT PRESENT
AGITATION: SOMEWHAT MORE THAN NORMAL ACTIVITY
NAUSEA AND VOMITING: *
PAROXYSMAL SWEATS: BARELY PERCEPTIBLE SWEATING. PALMS MOIST
TOTAL SCORE: 4
TOTAL SCORE: 4
AGITATION: SOMEWHAT MORE THAN NORMAL ACTIVITY
ON A TYPICAL DAY WHEN YOU DRINK ALCOHOL HOW MANY DRINKS DO YOU HAVE: 10
NAUSEA AND VOMITING: INTERMITTENT NAUSEA WITH DRY HEAVES
ORIENTATION AND CLOUDING OF SENSORIUM: DATE DISORIENTATION BY NO MORE THAN TWO CALENDAR DAYS
ORIENTATION AND CLOUDING OF SENSORIUM: ORIENTED AND CAN DO SERIAL ADDITIONS
TOTAL SCORE: 16
TOTAL SCORE: 5
VISUAL DISTURBANCES: VERY MILD SENSITIVITY
AUDITORY DISTURBANCES: NOT PRESENT
DOES PATIENT WANT TO TALK TO SOMEONE ABOUT QUITTING: NO
AGITATION: NORMAL ACTIVITY
PAROXYSMAL SWEATS: NO SWEAT VISIBLE
TREMOR: TREMOR NOT VISIBLE BUT CAN BE FELT, FINGERTIP TO FINGERTIP
TREMOR: *
HEADACHE, FULLNESS IN HEAD: MODERATE
AUDITORY DISTURBANCES: NOT PRESENT
NAUSEA AND VOMITING: MILD NAUSEA WITH NO VOMITING
VISUAL DISTURBANCES: NOT PRESENT
ALCOHOL_USE: YES
TOTAL SCORE: 10
ORIENTATION AND CLOUDING OF SENSORIUM: DATE DISORIENTATION BY NO MORE THAN TWO CALENDAR DAYS
AGITATION: NORMAL ACTIVITY
TOTAL SCORE: 15
VISUAL DISTURBANCES: VERY MILD SENSITIVITY
TREMOR: MODERATE TREMOR WITH ARMS EXTENDED
VISUAL DISTURBANCES: VERY MILD SENSITIVITY
HAVE YOU EVER FELT YOU SHOULD CUT DOWN ON YOUR DRINKING: YES
ANXIETY: MILDLY ANXIOUS
ORIENTATION AND CLOUDING OF SENSORIUM: ORIENTED AND CAN DO SERIAL ADDITIONS
TREMOR: MODERATE TREMOR WITH ARMS EXTENDED
TOTAL SCORE: 4
AGITATION: SOMEWHAT MORE THAN NORMAL ACTIVITY
VISUAL DISTURBANCES: MILD SENSITIVITY
NAUSEA AND VOMITING: MILD NAUSEA WITH NO VOMITING
CONSUMPTION TOTAL: POSITIVE
EVER_SMOKED: YES
PAROXYSMAL SWEATS: NO SWEAT VISIBLE
AUDITORY DISTURBANCES: NOT PRESENT
AUDITORY DISTURBANCES: NOT PRESENT
AGITATION: SOMEWHAT MORE THAN NORMAL ACTIVITY
HEADACHE, FULLNESS IN HEAD: MILD
PAROXYSMAL SWEATS: *
HEADACHE, FULLNESS IN HEAD: NOT PRESENT
PAROXYSMAL SWEATS: NO SWEAT VISIBLE
TREMOR: *
AUDITORY DISTURBANCES: NOT PRESENT
NAUSEA AND VOMITING: MILD NAUSEA WITH NO VOMITING
ANXIETY: MILDLY ANXIOUS
AGITATION: SOMEWHAT MORE THAN NORMAL ACTIVITY
ORIENTATION AND CLOUDING OF SENSORIUM: ORIENTED AND CAN DO SERIAL ADDITIONS
ANXIETY: MILDLY ANXIOUS
HAVE PEOPLE ANNOYED YOU BY CRITICIZING YOUR DRINKING: YES
AUDITORY DISTURBANCES: NOT PRESENT
PAROXYSMAL SWEATS: NO SWEAT VISIBLE
ANXIETY: MILDLY ANXIOUS
TOTAL SCORE: 4
ANXIETY: MILDLY ANXIOUS
NAUSEA AND VOMITING: MILD NAUSEA WITH NO VOMITING
AUDITORY DISTURBANCES: NOT PRESENT
ORIENTATION AND CLOUDING OF SENSORIUM: ORIENTED AND CAN DO SERIAL ADDITIONS
HOW MANY TIMES IN THE PAST YEAR HAVE YOU HAD 5 OR MORE DRINKS IN A DAY: 200
ORIENTATION AND CLOUDING OF SENSORIUM: ORIENTED AND CAN DO SERIAL ADDITIONS
ANXIETY: NO ANXIETY (AT EASE)
ANXIETY: NO ANXIETY (AT EASE)
AVERAGE NUMBER OF DAYS PER WEEK YOU HAVE A DRINK CONTAINING ALCOHOL: 7
AUDITORY DISTURBANCES: NOT PRESENT
HEADACHE, FULLNESS IN HEAD: MODERATE
TREMOR: *
AUDITORY DISTURBANCES: NOT PRESENT
TREMOR: *
NAUSEA AND VOMITING: NO NAUSEA AND NO VOMITING
ORIENTATION AND CLOUDING OF SENSORIUM: DATE DISORIENTATION BY NO MORE THAN TWO CALENDAR DAYS
TOTAL SCORE: 4
PAROXYSMAL SWEATS: NO SWEAT VISIBLE
DOES PATIENT WANT TO STOP DRINKING: NO
TOTAL SCORE: 12
VISUAL DISTURBANCES: NOT PRESENT
TREMOR: MODERATE TREMOR WITH ARMS EXTENDED
NAUSEA AND VOMITING: NO NAUSEA AND NO VOMITING
HEADACHE, FULLNESS IN HEAD: MODERATE
TOTAL SCORE: 17
PAROXYSMAL SWEATS: BARELY PERCEPTIBLE SWEATING. PALMS MOIST
ANXIETY: MILDLY ANXIOUS
HEADACHE, FULLNESS IN HEAD: NOT PRESENT
TOTAL SCORE: VERY MILD ITCHING, PINS AND NEEDLES SENSATION, BURNING OR NUMBNESS
AGITATION: NORMAL ACTIVITY
EVER FELT BAD OR GUILTY ABOUT YOUR DRINKING: YES
ORIENTATION AND CLOUDING OF SENSORIUM: DATE DISORIENTATION BY NO MORE THAN TWO CALENDAR DAYS
HEADACHE, FULLNESS IN HEAD: MODERATELY SEVERE
ANXIETY: NO ANXIETY (AT EASE)
VISUAL DISTURBANCES: NOT PRESENT
HEADACHE, FULLNESS IN HEAD: MODERATELY SEVERE
TOTAL SCORE: MODERATE ITCHING, PINS AND NEEDLES SENSATION, BURNING OR NUMBNESS
NAUSEA AND VOMITING: NO NAUSEA AND NO VOMITING

## 2019-09-30 ASSESSMENT — COGNITIVE AND FUNCTIONAL STATUS - GENERAL
SUGGESTED CMS G CODE MODIFIER DAILY ACTIVITY: CI
DAILY ACTIVITIY SCORE: 23
DRESSING REGULAR LOWER BODY CLOTHING: A LITTLE
SUGGESTED CMS G CODE MODIFIER MOBILITY: CH
MOBILITY SCORE: 24

## 2019-09-30 ASSESSMENT — ENCOUNTER SYMPTOMS
MYALGIAS: 1
BACK PAIN: 0
SENSORY CHANGE: 0
ABDOMINAL PAIN: 1
VOMITING: 0
EYE PAIN: 0
TINGLING: 0
DIARRHEA: 1
COUGH: 0
WHEEZING: 0
DIAPHORESIS: 0
SPUTUM PRODUCTION: 0
PND: 0
HEARTBURN: 0
CHILLS: 0
NECK PAIN: 0
SHORTNESS OF BREATH: 0
DIZZINESS: 0
EYE REDNESS: 0
SEIZURES: 0
SORE THROAT: 0
EYE DISCHARGE: 0
ORTHOPNEA: 0
NERVOUS/ANXIOUS: 1
HEADACHES: 1
BLOOD IN STOOL: 0
WEIGHT LOSS: 0
CONSTIPATION: 0
LOSS OF CONSCIOUSNESS: 0
NAUSEA: 1
PHOTOPHOBIA: 0
FEVER: 0
PALPITATIONS: 0

## 2019-09-30 ASSESSMENT — PATIENT HEALTH QUESTIONNAIRE - PHQ9
1. LITTLE INTEREST OR PLEASURE IN DOING THINGS: NOT AT ALL
2. FEELING DOWN, DEPRESSED, IRRITABLE, OR HOPELESS: NOT AT ALL
SUM OF ALL RESPONSES TO PHQ9 QUESTIONS 1 AND 2: 0
1. LITTLE INTEREST OR PLEASURE IN DOING THINGS: NOT AT ALL
2. FEELING DOWN, DEPRESSED, IRRITABLE, OR HOPELESS: NOT AT ALL
SUM OF ALL RESPONSES TO PHQ9 QUESTIONS 1 AND 2: 0

## 2019-09-30 NOTE — ED NOTES
Patient awake, ambulated independently to restroom, states she wants to leave the hospital, Dr. Carroll at bedside speaking with patient.

## 2019-09-30 NOTE — ED NOTES
Patient resting in hallway bed, sleeping, no signs of pain or distress, unlabored breathing noted, awaiting inpatient bed assignment, no cough noted, repositions self occasionally, frequent rounding performed, will continue to assess patient.

## 2019-09-30 NOTE — PROGRESS NOTES
Internal Medicine Interval Note  Note Author: Kira Eaton M.D.     Name Ashleigh Marquis     1960   Age/Sex 58 y.o. female   MRN [id number]   Code Status Full Code     After 5PM or if no immediate response to page, please call for cross-coverage  Attending/Team:   Dr. Ortega /Agustín See Patient List for primary contact information  Call [phone] to page    1st Call - Day Intern (R1):   Dr. Eaton 2nd Call - Day Sr. Resident (R2/R3):   Dr. Duke     ID: Ms Marquis is a 59 y/o female with a significant history of alcoholism with multiple ED visits, withdrawal seizures and DT, who was admitted to the hospital for alcohol intoxication.    Day of Admission: 1    Reason for interval visit  (Principal Problem)   Alcohol intoxication    Interval Problem Daily Status Update  (24 hours, problem oriented, brief subjective history, new lab/imaging data pertinent to that problem)   - Patient admitted overnight.   - She complains of nausea, headache, tremor and abdominal pain. She has little burning with urination, but no hematuria. Denies hallucinations. She is NPO since admission and asks for diet.   - She says that she is homeless, lives in a shelter. She has a daughter and sister who lives in [place]. She is on social security disability. At this time, her main concern is to have a stable housing. If she finds one, she will think about quitting alcohol. Working with CM to see if she is interested in [facility].  - On Tele, in SR at 80-85. Labs showed Hb 10.3, Mg 1.4, K 3.5, LA 3.1 -> 1.8 ->2.8, Alk phos 147    Review of Systems   Constitutional: Negative for fever, chills, diaphoresis  HENT: Negative for congestion, ear discharge, ear pain, hearing loss, sore throat and tinnitus.    Eyes: Negative for photophobia, pain, discharge and redness.   Respiratory: Negative for cough, sputum production, shortness of breath and wheezing.    Cardiovascular: Negative for chest pain, palpitations,  orthopnea, leg swelling and PND.   Gastrointestinal: Positive for nausea and abdominal pain. Negative for vomiting, blood in stool, constipation, heartburn.   Genitourinary: Positive for pain during urination. Negative for frequency, hematuria and urgency.   Musculoskeletal: Negative for back pain, joint pain and neck pain.   Skin: Negative for itching and rash.   Neurological: Positive for headaches and tremors. Negative for dizziness, tingling, sensory change, seizures and loss of consciousness.   Psychiatric/Behavioral: Positive for substance abuse. No nervousness/anxiousness.    Disposition/Barriers to discharge:   Patient is homeless and lives in a shelter. She will be observed today for alcohol withdrawal. [facility] is working on options to find a stable housing for her. May be [facility], but she doesn't seem to be interested at today's conversation with JUDI. Will follow up tomorrow.    Consultants/Specialty  None    PCP: Pcp Pt States None    Quality Measures  Quality-Core Measures   Reviewed items::  EKG reviewed, Labs reviewed, Medications reviewed and Radiology images reviewed  Velásquez catheter::  No Velásquez  DVT prophylaxis pharmacological::  Heparin    Physical Exam       Vitals:    09/29/19 2010 09/29/19 2238 09/30/19 0302 09/30/19 0746   BP: 138/79 148/86 133/85 135/76   Pulse: 90 88 82 77   Resp: 16 16 18 17   Temp: 36.3 °C (97.4 °F)  36.2 °C (97.2 °F) 36.8 °C (98.2 °F)   TempSrc: Temporal  Temporal Temporal   SpO2: 93% 95% 96% 96%   Weight:       Height:         Body mass index is 26.63 kg/m². Weight: 74.8 kg (165 lb)  Oxygen Therapy:  Pulse Oximetry: 96 %, O2 (LPM): 0, O2 Delivery: None (Room Air)    Physical Exam   Constitutional: She is oriented to person, place, and time and well-developed, well-nourished, and in no distress.   HENT:   Head: Normocephalic and atraumatic.   Mouth/Throat: Oropharynx is clear and moist.   Eyes: Pupils are equal, round, and reactive to light. Conjunctivae and EOM are normal.    Neck: Normal range of motion. Neck supple.   Cardiovascular: Normal rate, regular rhythm and normal heart sounds.   No murmur heard.  Pulmonary/Chest: Effort normal and breath sounds normal. She has no wheezes.   Abdominal: Soft. Bowel sounds are normal. She exhibits no mass. There is tenderness.   Suprapubic tenderness   Musculoskeletal: She exhibits no edema.   Lymphadenopathy:     She has no cervical adenopathy.   Neurological: She is alert and oriented to person, place, and time. She has normal reflexes. No cranial nerve deficit. Gait normal. Coordination normal.   Tremor + in both hands   Skin: No rash noted. She is not diaphoretic.   Psychiatric: Mood, memory, affect and judgment normal.     Assessment/Plan     * Alcohol intoxication (HCC)- (present on admission)  Assessment & Plan  Alcohol intoxication  - H/o chronic alcohol abuse/alcohol use disorder.    - Multiple prior admissions for alcohol intoxication/detoxification. Last admissions to the ER on the 22nd and 27th of this month.  - Reports of drinking >2 pints of Vodka Daily.  - Last drink at before coming to the hospital at a.m.  - Physical exam: Alcohol smell from breath present.  Tremors present.    - BAL on admission 0.40.  - UDS pending.  - Received LESTER Plasencia in ED  - Tele in SR at 80-85  Plan:  - Discontinue Tele  - CIWA protocol with lorazepam (17 on admission, then 5)  - Started librium 25 mg Q6H  - Fall/aspiration/seizure precautions  - On regular diet  -  ml/hr continuous   - Oral thiamine, folate, vitamin B12  - Monitoring K, PO4, Mg and replacing as needed     Hypokalemia- (present on admission)  Assessment & Plan  On presentation patient's potassium level is 3.5.  Patient was given 10 mEq IV potassium in the ER.  Plan:  - Recheck labs K at 3.5  - Replaced 40 meq oral  - Will monitor    Lactic acidosis  Assessment & Plan  On presentation patient has lactic acid level of 3.1.  Likely due to alcohol abuse.  Patient's BAL on  presentation 0.40.  Plan:  -Trended 3.1 -> 1.8 ->2.8  - IVF NS at 125 ml/hr  - will monitor    High anion gap metabolic acidosis - Resolved  Assessment & Plan  On presentation patient has anion gap of 13.0 likely due to alcohol abuse. BAL on admission 0.40.    Plan:  - On admission 13, but 10 this morning, normal anion gap  - More likely due to alcohol intoxication    Tobacco use  Assessment & Plan  - Smokes 1/2 ppd since 25 years  - Counseled on quitting, pt not ready at this time      Possible UTI- (present on admission)  Assessment & Plan  - Pt complains of abdominal pain and burning urination  - Suprapubic tenderness present  - Lipase normal (no epigastric pain/tenderness, r/o pancreatitis)  - UA pending    Elevated alkaline phosphatase level- (present on admission)  Assessment & Plan  - Alkaline phosphatase is 147.  AST and ALT are normal  - Likely secondary to alcohol abuse  - Will monitor    Essential hypertension- (present on admission)  Assessment & Plan  -Continue home medications lisinopril 10 mg and metoprolol 25 mg x 2  -Will monitor     Normocytic anemia- (present on admission)  Assessment & Plan  - On presentation patient's Hb is 11.7 with a MCV of 94.8  - Likely secondary to long-term alcohol abuse    Depression- (present on admission)  Assessment & Plan  -Continue on home meds Seroquel 50 mg and Doxepin 10 mg oral  -Follow-up with outpatient psychiatrist    GERD (gastroesophageal reflux disease)- (present on admission)  Assessment & Plan  - Continue on Omeprazole 20 mg

## 2019-09-30 NOTE — ED NOTES
Med rec updated and complete. Allergies reviewed.  Pt denies antibiotic use in last 14 days.  All morning doses taken.   Home pharmacy [address]

## 2019-09-30 NOTE — ED NOTES
Patient awake alert and oriented x 4, [place] 15, bed in low position, call light within reach, on room air, unlabored breathing noted, no cough noted, interacts with staff, interactions noted as appropriate, eating food provided by hospital, ambulated independently to restroom.

## 2019-09-30 NOTE — ED NOTES
Patient resting in bed, sleeping, no signs of pain or distress, unlabored breathing noted, repositions self occasionaly, bed in low position, awaiting inpatient bed assignment, will continue to assess patient.

## 2019-09-30 NOTE — ASSESSMENT & PLAN NOTE
On presentation patient has anion gap of 13.0 likely due to alcohol abuse. BAL on admission 0.40.  - resolved

## 2019-09-30 NOTE — NON-PROVIDER
Internal Medicine Medical Student Note  Note Author: Alva López, Student    Name Ashleigh Marquis     1960   Age/Sex 58 y.o. female   MRN [id number]   Code Status Full Code         Reason for interval visit  (Principal Problem)   Alcohol intoxication (HCC)    Interval Problem Daily Status Update  (problem status, last 24 hours, new history, new data )     CIWA noted at 5 this morning. Pt sleeping and eating well. Having regular bowel movements. Feeling slightly diaphoretic with slight upper lip tremor. Expresses desire to quit alcohol use. Has plan to look for more permanent housing option through [employer] program.     ROS:  See HPI. No new nausea, vomiting, headache.     Physical Exam       Vitals:    19 2238 19 0302 19 0746   BP: 138/79 148/86 133/85 135/76   Pulse: 90 88 82 77   Resp: 16 16 18 17   Temp: 36.3 °C (97.4 °F)  36.2 °C (97.2 °F) 36.8 °C (98.2 °F)   TempSrc: Temporal  Temporal Temporal   SpO2: 93% 95% 96% 96%   Weight:       Height:         Body mass index is 26.63 kg/m². Weight: 74.8 kg (165 lb)  Oxygen Therapy:  Pulse Oximetry: 96 %, O2 (LPM): 0, O2 Delivery: None (Room Air)    Physical Exam   HENT:   Head: Normocephalic and atraumatic.   Eyes: Pupils are equal, round, and reactive to light. Conjunctivae and EOM are normal.   Neck: Neck supple.   Cardiovascular: Normal rate and regular rhythm. Exam reveals no gallop and no friction rub.   No murmur heard.  Pulmonary/Chest: Effort normal and breath sounds normal. No respiratory distress. She has no wheezes. She has no rales.   Abdominal: Soft. Bowel sounds are normal. She exhibits no distension and no mass. There is no tenderness. There is no rebound and no guarding.   Musculoskeletal: She exhibits no edema.   Neurological:   Slight tremor noted when arms extended and abducted to level of shoulders   Skin: She is diaphoretic.   Sweat beads visible above upper lip.       Ref. Range 2019 06:26   WBC  Latest Ref Range: 4.8 - 10.8 K/uL 5.0   RBC Latest Ref Range: 4.20 - 5.40 M/uL 3.40 (L)   Hemoglobin Latest Ref Range: 12.0 - 16.0 g/dL 10.3 (L)   Hematocrit Latest Ref Range: 37.0 - 47.0 % 32.2 (L)   MCV Latest Ref Range: 81.4 - 97.8 fL 94.7   MCH Latest Ref Range: 27.0 - 33.0 pg 30.3   MCHC Latest Ref Range: 33.6 - 35.0 g/dL 32.0 (L)   RDW Latest Ref Range: 35.9 - 50.0 fL 61.4 (H)   Platelet Count Latest Ref Range: 164 - 446 K/uL 199   MPV Latest Ref Range: 9.0 - 12.9 fL 10.8   Neutrophils-Polys Latest Ref Range: 44.00 - 72.00 % 50.50   Neutrophils (Absolute) Latest Ref Range: 2.00 - 7.15 K/uL 2.54   Lymphocytes Latest Ref Range: 22.00 - 41.00 % 38.00   Lymphs (Absolute) Latest Ref Range: 1.00 - 4.80 K/uL 1.91   Monocytes Latest Ref Range: 0.00 - 13.40 % 8.50   Monos (Absolute) Latest Ref Range: 0.00 - 0.85 K/uL 0.43   Eosinophils Latest Ref Range: 0.00 - 6.90 % 2.00   Eos (Absolute) Latest Ref Range: 0.00 - 0.51 K/uL 0.10   Basophils Latest Ref Range: 0.00 - 1.80 % 0.80   Baso (Absolute) Latest Ref Range: 0.00 - 0.12 K/uL 0.04   Immature Granulocytes Latest Ref Range: 0.00 - 0.90 % 0.20   Immature Granulocytes (abs) Latest Ref Range: 0.00 - 0.11 K/uL 0.01   Nucleated RBC Latest Units: /100 WBC 0.00   NRBC (Absolute) Latest Units: K/uL 0.00   Sodium Latest Ref Range: 135 - 145 mmol/L 143   Potassium Latest Ref Range: 3.6 - 5.5 mmol/L 3.5 (L)   Chloride Latest Ref Range: 96 - 112 mmol/L 109   Co2 Latest Ref Range: 20 - 33 mmol/L 24   Anion Gap Latest Ref Range: 0.0 - 11.9  10.0   Glucose Latest Ref Range: 65 - 99 mg/dL 82   Bun Latest Ref Range: 8 - 22 mg/dL 14   Creatinine Latest Ref Range: 0.50 - 1.40 mg/dL 0.62   GFR If  Latest Ref Range: >60 mL/min/1.73 m 2 >60   GFR If Non  Latest Ref Range: >60 mL/min/1.73 m 2 >60   Calcium Latest Ref Range: 8.5 - 10.5 mg/dL 8.3 (L)   Phosphorus Latest Ref Range: 2.5 - 4.5 mg/dL 3.1   Magnesium Latest Ref Range: 1.5 - 2.5 mg/dL 1.4 (L)         Ref. Range 9/30/2019 12:44   Lactic Acid Latest Ref Range: 0.5 - 2.0 mmol/L 2.8 (H) up from 1.8 at 0824         Assessment/Plan     # Alcohol intoxication   - H/o chronic alcohol use/alcohol use disorder    - Pt expresses understanding of chronic alcohol use on her health   - Currently expresses desire to stop using alcohol, but lacks stability to successfully quit d/t being homeless    - Discussed plan to find housing through social work [facility] [employer] [facility]   Plan:    - Continue monitoring on telemetry   - Continue watching for signs of alcohol withdrawal   - Continue Lubrium for alcohol withdrawal prophylaxis   - Continue CIWA protocol with Ativan   - Continue Rally bag, MV   - PO MV, thiamine, folate, vitamin B12   - Fall/aspiration/seizure precautions   - Consult social work for housing and to find alcoholism treatment program    - Continue considering [facility] program in [place]    # Hypokalemia   - 3.5 at 0626 this morning, will likely improve with continued PO intake and NS infusion   - Continue following potassium levels daily and replete accordingly    # Lactic acidosis   After initial decrease to normal range, lactic acid levels are now elevated due to dehydration following initial detox IVF in ED.    - NS infusion    # High anion gap metabolic acidosis   Anion gap back to normal range.   - Continue following CIWA protocol    # Elevated alkaline phosphatase level    - Re-check alk phos levels to see if decreased from 147 at initial presentation.    # Essential hypertension   Blood pressures have remained in the 130s systolic    - Continue pt's home medications lisinopril 10 mg and metoprolol 25 mg   - Continue monitoring BP    # Normocytic anemia   Likely secondary to chronic alcohol abuse.   - Continue monitoring for other potential causes    # Depression   - Continue home med regimen of Seroquel 50 mg and Doxepin 10 mg cap   - Follow-up with outpatient psychiatrist

## 2019-09-30 NOTE — DIETARY
Nutrition Services: Day 0 of admit.  Ashleigh Marquis is a 58 y.o. female with admitting DX of Alcohol intoxication.  Consult received for 14-23 lb wt loss x 6 months.    Patient observed at end of consuming breakfast; % consumed per RD visualization of meal tray. Despite adequate PO intake of this meal, patient reports low appetite currently and prior to admission. Patient also verbalized gradual weight loss. She was unable to communicate time frames but stated she was 87.7 kg (193 lb) at one point.     Assessment:  Ht: 167.6 cm, Wt: 74.8 kg via bed scale, BMI: 26.63 - overweight  Diet/Intake: regular - Per chart no pt PO recorded to assess     Evaluation:   1. Weight: Weight was 72.7 kg (160 lb) on 7/7/2019 and was 91.8 kg (202 lb) on 3/31/2019 per chart review. 2.1 kg (4.62 lb) weight increase. Weight appears stable for last 3 months.   2. Labs: potassium 2.5, calcium 8.3, magnesium 1.4  3. Meds: pericolace, thiamine tablet, MVI, folvite, potassium chloride 40 mEq  4. Fluids: magnesium sulfate IVPB 4 g @ 25 ml/hr, NS infusion @ 125 ml/hr, detox IV 1000 ml @ 500 ml/hr    Malnutrition Risk: Patient appears well-nourished. No criteria met at this time.     Recommendations/Plan:  1. Encourage intake of meals.  2. Document intake of all meals as % taken in ADL's to provide interdisciplinary communication across all shifts.   3. Monitor weight.  4. Nutrition rep will continue to see patient for ongoing meal and snack preferences.  5. Obtain supplement order per RD as needed.    RD following.

## 2019-09-30 NOTE — DISCHARGE PLANNING
CM spoke with MD who states that pt has several admissions and he would like for this CM to speak to her about [facility] housing and a new rep payee. Pt has a history of etoh abuse.   CM met with pt at bedside. Pt states that she usually goes to a motel for a few days and then is homeless for the rest of the month after receiving her SSD of $735 per month. She states that lately she picks up her check from the SSD office because she lost her rep payee service.   CM spoke with pt about [facility] and obtaining a new rep payee. Pt states that she knows she needs somewhere to go at discharge and wants housing, but is more vague when discussing if she wants to stop drinking, which will be a requirement to stay at [facility]. Pt states that she will think about it this evening. CM let pt know she would come back tomorrow to discuss.   Care Transition Team Assessment    Information Source  Orientation : Disoriented to Time  Information Given By: Patient  Informant's Name: Ashleigh  Who is responsible for making decisions for patient? : Patient         Elopement Risk  Legal Hold: No  Ambulatory or Self Mobile in Wheelchair: Yes  Disoriented: No  Psychiatric Symptoms: None  History of Wandering: No  Elopement this Admit: No  Vocalizing Wanting to Leave: No  Displays Behaviors, Body Language Wanting to Leave: No-Not at Risk for Elopement  Elopement Risk: Not at Risk for Elopement    Interdisciplinary Discharge Planning  Primary Care Physician: None  Lives with - Patient's Self Care Capacity: Alone and Able to Care For Self  Patient or legal guardian wants to designate a caregiver (see row info): No  Support Systems: Children  Housing / Facility: Homeless, Motel  Do You Take your Prescribed Medications Regularly: Yes  Able to Return to Previous ADL's: Other  Mobility Issues: No  Prior Services: None  Patient Expects to be Discharged to:: Undetermined  Assistance Needed: Unknown at this Time    Discharge  Preparedness  What is your plan after discharge?: Uncertain - pending medical team collaboration  What are your discharge supports?: Child  Prior Functional Level: Ambulatory, Independent with Activities of Daily Living, Independent with Medication Management    Functional Assesment  Prior Functional Level: Ambulatory, Independent with Activities of Daily Living, Independent with Medication Management    Finances  Financial Barriers to Discharge: Yes  Average Monthly Income: 735 $  Source of Income: Social Security Disability  Prescription Coverage: Yes    Vision / Hearing Impairment  Vision Impairment : No  Hearing Impairment : No              Domestic Abuse  Have you ever been the victim of abuse or violence?: No  Physical Abuse or Sexual Abuse: No  Verbal Abuse or Emotional Abuse: No  Possible Abuse Reported to:: Not Applicable    Psychological Assessment  History of Substance Abuse: Alcohol  Date Last Used - Alcohol: 09/29/2019    Discharge Risks or Barriers  Discharge risks or barriers?: No PCP, Substance abuse, Post-acute placement / services  Patient risk factors: Homeless    Anticipated Discharge Information  Anticipated discharge disposition: Shelter

## 2019-09-30 NOTE — ASSESSMENT & PLAN NOTE
- On presentation patient's Hb is 11.7 with a MCV of 94.8  - Likely secondary to long-term alcohol abuse

## 2019-09-30 NOTE — ASSESSMENT & PLAN NOTE
Alcohol intoxication  - H/o chronic alcohol use disorder.    - Multiple prior admissions for alcohol intoxication/detoxification. Last admissions to the ER on the 22nd and 27th of September.  - Reports of drinking >2 pints of Vodka Daily.  - Last drink at before coming to the hospital at a.m.  - Physical exam: Alcohol smell from breath present.  Tremors present on admission  - BAL on admission 0.40.  - UDS pending.  - Received LESTER Plasencia in ED, followed by oral vitamins and electrolyte supplementation  - no events on tele  Plan:  - On regular diet  - Oral thiamine, folate, vitamin B12  - Mg supplementation

## 2019-09-30 NOTE — PROGRESS NOTES
2 RN skin check completed with TETO Dimas.  Pt has generalized bruising and scabs throughout body.  Pt also has a abrasion to above R eye that appears to be healing.

## 2019-09-30 NOTE — ED NOTES
Patient resting in bed, sleeping, no signs of pain or distress, unlabored breathing noted, IV fluids infusing, bed in low position, frequent rounding performed, will continue to assess patient.

## 2019-09-30 NOTE — PROGRESS NOTES
Pt rec'd from ER and bedside report rec'd.  Pt is AAOx3 and is disoriented to time.  C/o pain generalized from multiple falls recently.  Pt. Scored 17 on CIWA and PRN Ativan given.  V/S stable.  Educated pt on fall prevention and call bell placed in reach.  Educated pt on NPO status.  Bed alarm is on.  Will continue to monitor pt.

## 2019-09-30 NOTE — DISCHARGE PLANNING
Patient is eligible for Medicaid Meds to Beds at discharge. Preferred pharmacy changed to [facility]. Please call x [phone] prior to discharge.

## 2019-09-30 NOTE — ED PROVIDER NOTES
ED PROVIDER NOTE     Scribed for Naresh Elise M.D. by Benedict Sofia. 9/29/2019, 8:16 PM.    CHIEF COMPLAINT  Chief Complaint   Patient presents with   • Alcohol Intoxication     Found wandering [place] [address] [place]       HPI    Primary care provider: None noted  Means of arrival: Walk-in  History obtained from: Nurse, patient  History limited by: Altered Mental Status     Ashleigh Marquis is a 58 y.o. female who presents to the ED by ambulance for alcohol intoxication onset a couple hours prior to my exam. Per nurse, patient was found wandering in [address] [place] earlier and was brought to ED by EMS. Nurse also notes patient had a ED visit yesterday and earlier today as well. Upon my exam, patient complains of neck pain, back pain, and general body aches.     History limited secondary to altered mental status.     REVIEW OF SYSTEMS  Musculoskeletal: Positive for neck pain, back pain, and general body aches.   Psychiatric: Alcohol intoxication.     Review of systems limited secondary to altered mental status.     PAST MEDICAL HISTORY   has a past medical history of Alcoholism (HCC), Anxiety, Arthritis, ASTHMA, Cancer ([facility]) (1981), Chronic low back pain, Congestive heart failure ([facility]), Depression, EtOH dependence ([facility]), Fall, GERD (gastroesophageal reflux disease), HTN, Hypertension, Indigestion, Muscle disorder, OSTEOPOROSIS, Other specified symptom associated with female genital organs, Psychiatric disorder, PTSD (post-traumatic stress disorder), Renal disorder, Seizure ([facility]), Ulcer, and Vitamin D deficiency.    PAST FAMILY HISTORY  Family History   Problem Relation Age of Onset   • Heart Disease Father    • Hypertension Father    • Diabetes Father    • Cancer Paternal Grandmother    • Depression Other    • Lung Disease Neg Hx    • Stroke Neg Hx        SOCIAL HISTORY  Social History     Tobacco Use   • Smoking status: Current Every Day Smoker     Packs/day: 0.50     Years: 20.00     Pack years: 10.00      "Types: Cigarettes   • Smokeless tobacco: Never Used   • Tobacco comment: 1/2 pack per day   Substance and Sexual Activity   • Alcohol use: Yes     Comment: vodka, heavy use   • Drug use: No   • Sexual activity: Never     Partners: Male       SURGICAL HISTORY   has a past surgical history that includes hernia repair (1977); cysto stent placemnt pre surg (10/7/2010); cystoscopy stent placement (11/9/2010); ureteroscopy (11/9/2010); lasertripsy (11/9/2010); stent removal (11/9/2010); and gastroscopy-endo (N/A, 10/3/2018).    CURRENT MEDICATIONS  Home Medications     Reviewed by Aretha Toro (Pharmacy Tech) on 09/29/19 at 2237  Med List Status: Complete   Medication Last Dose Status   clonazePAM (KLONOPIN) 0.5 MG Tab 9/29/2019 Active   doxepin (SINEQUAN) 10 MG Cap 9/28/2019 Active   lisinopril (PRINIVIL) 10 MG Tab 9/29/2019 Active   metoprolol (LOPRESSOR) 25 MG Tab 9/29/2019 Active   QUEtiapine (SEROQUEL) 25 MG Tab 9/28/2019 Active                ALLERGIES  Allergies   Allergen Reactions   • Sulfa Drugs Vomiting   • Toradol Vomiting   • Gabapentin      Bowel incontinence     • Amoxicillin Rash     Reported by NAIDA on arrival to ED    Pt states she takes PCN 10/3       PHYSICAL EXAM  VITAL SIGNS: /79   Pulse 90   Temp 36.3 °C (97.4 °F) (Temporal)   Resp 16   Ht 1.676 m (5' 6\")   Wt 74.8 kg (165 lb)   LMP 11/02/2015   SpO2 93%   BMI 26.63 kg/m²    Constitutional: Unkempt, Chronically ill-appearing, Lying on stretcher   HEENT: Normocephalic, Bruising to left forehead. Dry mucous membrane, No exudates, Poor dentition.   Eyes: Injected sclerae. Pupils 3 mm and symmetric.   Neck: Midline tenderness, No step offs.   Chest/Pulmonary: Coarse breath sounds bilaterally, No wheezing.   Cardiovascular: Regular rate and rhythm, no murmur.   Abdomen: Soft, nontender; no rebound, guarding, or masses.  Back: No CVA or midline tenderness.   Musculoskeletal: No deformity or edema.  Neuro: Slurred speech but no other " focus weakness or asymmetry.   Psych: Cantankerous, Mildly cooperative.    Skin: No rashes, warm and dry.    DIAGNOSTIC STUDIES / PROCEDURES    LABS & EKG  Results for orders placed or performed during the hospital encounter of 09/29/19   CBC WITH DIFFERENTIAL   Result Value Ref Range    WBC 5.7 4.8 - 10.8 K/uL    RBC 3.83 (L) 4.20 - 5.40 M/uL    Hemoglobin 11.7 (L) 12.0 - 16.0 g/dL    Hematocrit 36.3 (L) 37.0 - 47.0 %    MCV 94.8 81.4 - 97.8 fL    MCH 30.5 27.0 - 33.0 pg    MCHC 32.2 (L) 33.6 - 35.0 g/dL    RDW 60.5 (H) 35.9 - 50.0 fL    Platelet Count 242 164 - 446 K/uL    MPV 9.6 9.0 - 12.9 fL    Neutrophils-Polys 57.60 44.00 - 72.00 %    Lymphocytes 32.70 22.00 - 41.00 %    Monocytes 6.50 0.00 - 13.40 %    Eosinophils 1.80 0.00 - 6.90 %    Basophils 1.20 0.00 - 1.80 %    Immature Granulocytes 0.20 0.00 - 0.90 %    Nucleated RBC 0.00 /100 WBC    Neutrophils (Absolute) 3.27 2.00 - 7.15 K/uL    Lymphs (Absolute) 1.86 1.00 - 4.80 K/uL    Monos (Absolute) 0.37 0.00 - 0.85 K/uL    Eos (Absolute) 0.10 0.00 - 0.51 K/uL    Baso (Absolute) 0.07 0.00 - 0.12 K/uL    Immature Granulocytes (abs) 0.01 0.00 - 0.11 K/uL    NRBC (Absolute) 0.00 K/uL   APTT   Result Value Ref Range    APTT 23.4 (L) 24.7 - 36.0 sec   PROTHROMBIN TIME (INR)   Result Value Ref Range    PT 12.9 12.0 - 14.6 sec    INR 0.95 0.87 - 1.13   VENOUS BLOOD GAS   Result Value Ref Range    Venous Bg Ph 7.42 7.31 - 7.45    Venous Bg Pco2 34.4 (L) 41.0 - 51.0 mmHg    Venous Bg Po2 43.6 (H) 25.0 - 40.0 mmHg    Venous Bg O2 Saturation 74.5 %    Venous Bg Hco3 22 (L) 24 - 28 mmol/L    Venous Bg Base Excess -2 mmol/L    Body Temp see below Centigrade   LACTIC ACID   Result Value Ref Range    Lactic Acid 3.1 (H) 0.5 - 2.0 mmol/L   Comp Metabolic Panel   Result Value Ref Range    Sodium 147 (H) 135 - 145 mmol/L    Potassium 3.5 (L) 3.6 - 5.5 mmol/L    Chloride 110 96 - 112 mmol/L    Co2 24 20 - 33 mmol/L    Anion Gap 13.0 (H) 0.0 - 11.9    Glucose 107 (H) 65 - 99 mg/dL     Bun 15 8 - 22 mg/dL    Creatinine 0.88 0.50 - 1.40 mg/dL    Calcium 8.7 8.5 - 10.5 mg/dL    AST(SGOT) 39 12 - 45 U/L    ALT(SGPT) 19 2 - 50 U/L    Alkaline Phosphatase 147 (H) 30 - 99 U/L    Total Bilirubin 0.2 0.1 - 1.5 mg/dL    Albumin 4.1 3.2 - 4.9 g/dL    Total Protein 7.0 6.0 - 8.2 g/dL    Globulin 2.9 1.9 - 3.5 g/dL    A-G Ratio 1.4 g/dL   DIAGNOSTIC ALCOHOL   Result Value Ref Range    Diagnostic Alcohol 0.40 (H) 0.00 g/dL   LIPASE   Result Value Ref Range    Lipase 72 11 - 82 U/L   MAGNESIUM   Result Value Ref Range    Magnesium 1.4 (L) 1.5 - 2.5 mg/dL   ESTIMATED GFR   Result Value Ref Range    GFR If African American >60 >60 mL/min/1.73 m 2    GFR If Non African American >60 >60 mL/min/1.73 m 2     All labs reviewed by me.    RADIOLOGY  CT-HEAD W/O   Final Result      No acute intracranial abnormality.               INTERPRETING LOCATION:  [address] [place] [address], [facility]      CT-CSPINE WITHOUT PLUS RECONS   Final Result      No acute fracture or traumatic malalignment.      DX-PELVIS-1 OR 2 VIEWS   Final Result      No bony abnormality.      DX-CHEST-PORTABLE (1 VIEW)   Final Result      No acute cardiopulmonary abnormality.        The radiologist's interpretations of all radiological studies have been reviewed by me.        COURSE & MEDICAL DECISION MAKING    This is a 58 y.o. female who presents with alcohol intoxication onset a couple hours prior to my exam.    Differential Diagnosis includes but is not limited to: Alcohol intoxication, Dehydration, Trauma, and Debility.     ED Course:  8:16 PM - Patient was examined at bedside. Discussed with patient I will order labs and imaging to further evaluate and give her vitamins and fluids for her symptoms. Patient consents to plan of care. Ordered CT head, CT-Cspine, DX-Chest, DX-Pelvis, CMP, Diagnostic alcohol, Lipase, Magnesium, Estimated GFR, Lactic acid, Venous Blood, CBC with differential, Aptt, and INR . Patient will be medicated with Detox IV infusion  1,000 mL.    HYDRATION: The patient will receive IV fluids for concerns of dehydration and NPO in case she has a seizure as she is at high risk of severe ETOH withdrawal.     9:54 PM - Patient was reevaluated at bedside. Patient is sitting up at bedside and admits to being scared. Discussed diagnostic results. Updated plan of care.  On reassessment, after fluids, she has more moist mucous membranes and is clinically improving.  The patient has had dozens of visits to this emergency department related to her severe alcohol abuse.  Her lab work-up is concerning for mild lactic acidosis and elevated anion gap acidosis and significant hypomagnesemia and hypokalemia.  No fevers white count normal doubt serious infection.  Alcohol level severely elevated to 400.  Thankfully her imaging is reassuring with no evidence of acute trauma to her head neck chest or pelvis.  The patient is slowly sobering, still looks intoxicated and will be for some time, this is her third visit in 48 hours and I do not feel that she is taking care of herself and she looks quite unwell.  I offered to admit her to try to help her with medically withdraw from alcohol, and she very much appreciates this.  After prolonged discussion and reassurance with the patient she will accept admission, she will undoubtedly have significant alcohol withdrawal and merits close monitoring.    10:12 PM - Paged Internal Med.     10:28 PM - I discussed the patient's case and the above findings with Dr. Miranda (Internal Med) who agrees to admit and transfer care of patient.  Her vital signs have remained stable and she will be transferred to the telemetry unit in guarded condition.  She is having a positive response to parenteral fluids, vitals are improving and she is feeling better.    Upon my evaluation, this patient had a high probability of imminent or life-threatening deterioration due to dehydration, lactic acidosis, high anion gap metabolic acidosis.     I  personally provided 35 minutes of total critical care time outside of time spent on separately billable/documented procedures. This required my direct attention, intervention, and management which included the following:  -review of laboratory data  -review of radiology studies  -discussion with consultant: Admitting medical service  -Prolonged discussions with the patient  -monitoring for potential decompensation and watching for signs of severe alcohol withdrawal  -IV fluid rehydration and parenteral vitamin repletion    Medications   senna-docusate (PERICOLACE or SENOKOT S) 8.6-50 MG per tablet 2 Tab (2 Tabs Oral Refused 9/29/19 7311)     And   polyethylene glycol/lytes (MIRALAX) PACKET 1 Packet (has no administration in time range)     And   magnesium hydroxide (MILK OF MAGNESIA) suspension 30 mL (has no administration in time range)     And   bisacodyl (DULCOLAX) suppository 10 mg (has no administration in time range)   LORazepam (ATIVAN) tablet 0.5 mg (has no administration in time range)   LORazepam (ATIVAN) tablet 1 mg (has no administration in time range)     Or   LORazepam (ATIVAN) injection 0.5 mg (has no administration in time range)   LORazepam (ATIVAN) tablet 2 mg (2 mg Oral Given 9/30/19 0216)     Or   LORazepam (ATIVAN) injection 1 mg ( Intravenous See Alternative 9/30/19 0216)   LORazepam (ATIVAN) tablet 3 mg (has no administration in time range)     Or   LORazepam (ATIVAN) injection 1.5 mg (has no administration in time range)   LORazepam (ATIVAN) tablet 4 mg (has no administration in time range)     Or   LORazepam (ATIVAN) injection 2 mg (has no administration in time range)   thiamine tablet 100 mg (has no administration in time range)     And   multivitamin (THERAGRAN) tablet 1 Tab (has no administration in time range)     And   folic acid (FOLVITE) tablet 1 mg (has no administration in time range)   labetalol (NORMODYNE,TRANDATE) injection 10 mg (has no administration in time range)     Or    labetalol (NORMODYNE) tablet 200 mg (has no administration in time range)   heparin injection 5,000 Units (5,000 Units Subcutaneous Refused 9/29/19 2330)   doxepin (SINEQUAN) capsule 10 mg (10 mg Oral Refused 9/30/19 0215)   lisinopril (PRINIVIL) 10 MG tablet 10 mg (has no administration in time range)   QUEtiapine (SEROQUEL) tablet 50 mg (50 mg Oral Refused 9/30/19 0215)   metoprolol (LOPRESSOR) tablet 25 mg (has no administration in time range)   detox IV 1000 mL (D5LR + magnesium 1 g + thiamine 100 mg + folic acid 1 mg) infusion ( Intravenous Stopped 9/29/19 2315)     FINAL IMPRESSION  1. Dehydration    2. Lactic acidosis    3. Hypomagnesemia    4. Debility    5. Hypokalemia    6. High anion gap metabolic acidosis    7. Multiple contusions    8. Essential hypertension    9. Personality disorder (HCC)    10. Chronic pain syndrome    11. Alcohol intoxication with delirium (HCC)        -ADMIT-      Pertinent Labs & Imaging studies reviewed and verified by myself, as well as nursing notes and the patient's past medical, family, and social histories (See chart for details).    IBenedict (Scribe), am scribing for, and in the presence of, Naresh Elise M.D..    Electronically signed by: Benedict Sofia (Scribe), 9/29/2019    Naresh GAN M.D. personally performed the services described in this documentation, as scribed by Benedict Sofia in my presence, and it is both accurate and complete.    Portions of this record were made with voice recognition software.  Despite my review, spelling/grammar/context errors may still remain.  Interpretation of this chart should be taken in this context.    C    The note accurately reflects work and decisions made by me.  Naresh Elise  9/30/2019  3:05 AM

## 2019-09-30 NOTE — ED NOTES
Patient resting in bed, sleeping, no signs of pain or distress, unlabored breathing noted, IV fluids completed, bed in low position, frequent rounding performed, will continue to assess patient.

## 2019-09-30 NOTE — SENIOR ADMIT NOTE
"Senior Admit Note    Patient: Ashleigh Marquis.  MRN: [id number].                               Chief complaint: alcohol intoxication requesting detoxification    Problem list:   # alcohol intoxication requesting detoxification - Last drink \"today.\"  Diagnostic alcohol 0.40.  Received Rally bag and 2 mg lorazepam in the ED.   # history of alcohol abuse, including multiple prior admissions for alcohol intoxication/detoxification  # normocytic anemia  # hypokalemia  # anion gap metabolic acidosis  # lactic acidosis  # elevated alkaline phos    Plan:   - Admit to Telemetry as outpatient.  - CIWA protocol with lorazepam.    - Fall/aspiration/seizure precautions.   - Consider Behavioral medicine/ Mental Health consult in the AM.   - PO MV, thiamine, folate, vitamin B12.    - Replete electrolytes PRN.       DVT prophylaxis: heparin.  Code status: FULL.     For complete details, please refer to H&P by intern.     Cheryl Miranda M.D.      "

## 2019-09-30 NOTE — PROGRESS NOTES
Bedside report received. Patient resting in bed. RN assessed pain; non verbal pain scale indicates no assumed pain present at this time. Call light within reach. Need for bed alarm assessed; patient is at moderate risk for falls, bed alarm in use, fall precautions in place as appropriate.

## 2019-09-30 NOTE — CARE PLAN
Problem: Safety  Goal: Will remain free from falls  Outcome: PROGRESSING AS EXPECTED  Note:   Pt is AAOx3 and does not use call bell appropriately.  Bed in lowest position.  Wheels locked.  Call bell in reach.  Bed alarm is on.     Problem: Infection  Goal: Will remain free from infection  Outcome: PROGRESSING AS EXPECTED  Note:   No s/s of infection thru shift.

## 2019-09-30 NOTE — PROGRESS NOTES
Patient resting in bed for majority of morning. MD was able to round with patient early on. Plan of care discussed. Patient continues to score Q4hr CIWA. RN to provide intervention as appropriate. Patient declines any additional needs at this time. Call light within reach. Fall precautions in place. Will continue with hourly rounding.

## 2019-09-30 NOTE — ASSESSMENT & PLAN NOTE
On presentation patient's potassium level is 3.5.  Patient was given 10 mEq IV potassium in the ER.  - resolved

## 2019-09-30 NOTE — ED NOTES
Patient resting in bed, sleeping, no signs of pain or distress, unlabored breathing noted, water provided since previous entry, bed in low position, frequent rounding performed, will continue to assess patient.

## 2019-09-30 NOTE — ASSESSMENT & PLAN NOTE
- Pt complains of abdominal pain and burning urination  - Suprapubic tenderness present  - Lipase normal (no epigastric pain/tenderness, r/o pancreatitis)  - UA - negative  - No UTI

## 2019-09-30 NOTE — ED TRIAGE NOTES
Chief Complaint   Patient presents with   • Alcohol Intoxication     [place] [address] [place]

## 2019-09-30 NOTE — CARE PLAN
Problem: Knowledge Deficit  Goal: Knowledge of disease process/condition, treatment plan, diagnostic tests, and medications will improve  Outcome: PROGRESSING AS EXPECTED  Goal: Knowledge of the prescribed therapeutic regimen will improve  Outcome: PROGRESSING AS EXPECTED   Pt updated on POC, tests, and medications. Pt verbalizes understanding and has no further questions at this time. Pt educated on calling for any more questions.     Problem: Psychosocial Needs:  Goal: Level of anxiety will decrease  Outcome: PROGRESSING AS EXPECTED  RN will aid patient in developing strategies to cope with anxiety triggers. RN will encourage patient to voice feelings and provide active listening to decrease patients anxiety levels.

## 2019-09-30 NOTE — ASSESSMENT & PLAN NOTE
On presentation patient has lactic acid level of 3.1.  Likely due to alcohol abuse.  Patient's BAL on presentation 0.40.  Plan:  -Trended 3.1 -> 1.8 ->2.8 -> 1.3 -> 1.2  - Resolved

## 2019-09-30 NOTE — H&P
Internal Medicine Admitting History and Physical    Note Author: Riley Desir M.D.       Name Ashleigh Marquis     1960   Age/Sex 58 y.o. female   MRN [id number]   Code Status Full Code     After 5PM or if no immediate response to page, please call for cross-coverage  Attending/Team: Dr. Ortega/YNES Randolph See Patient List for primary contact information  Call [phone] to page    1st Call - Day Intern (R1):   Dr. Eaton 2nd Call - Day Sr. Resident (R2/R3):   Dr. Duke       Chief Complaint:   Alcohol Intoxication    HPI:  58-year-old  female with past medical history significant for alcoholism, anxiety, arthritis, asthma, chronic low back pain, depression, GERD, hypertension, psychiatric disorder presents to us with alcohol intoxication, generalized body aches.    Patient's history is limited secondary to altered mental status.  Per ER physician note patient [place] [address]  [place] and was found to the ED by EMS.  On questioning patient states that her last drink was before she came to the ER.  Patient endorses drinking more than 2 pints a day of vodka.  She states that she has had history of seizures and delirium tremens and has been admitted multiple times because of alcohol intoxication.  Patient complains of generalized body aches , associated nausea, abdominal discomfort and diarrhea.  Per chart review patient was brought to the ER on 2019 and 2019 for similar complaints of altered mental status secondary to alcohol intoxication.    Patient denies any fever/chills, episode of emesis, chest pain, palpitations, dyspnea on exertion, cough, SOB.    Pertinent labs include: BAL on admission 0.40, K: 3.5, ALP: 147, Hb: 11.7, lactic acid 3.1, anion gap 13.    Vitals on presentation:   Temperature 97.4 F  pulse: 90 RR: 16 BP: 138/79, SPO2 93% on room air    Review of Systems   Constitutional: Negative for chills, diaphoresis, fever, malaise/fatigue  and weight loss.   HENT: Negative for congestion, ear discharge, ear pain, hearing loss, sore throat and tinnitus.    Eyes: Negative for photophobia, pain, discharge and redness.   Respiratory: Negative for cough, sputum production, shortness of breath and wheezing.    Cardiovascular: Negative for chest pain, palpitations, orthopnea, leg swelling and PND.   Gastrointestinal: Positive for abdominal pain, diarrhea and nausea. Negative for blood in stool, constipation, heartburn and vomiting.   Genitourinary: Negative for dysuria, frequency, hematuria and urgency.   Musculoskeletal: Positive for myalgias. Negative for back pain, joint pain and neck pain.   Skin: Negative for itching and rash.   Neurological: Positive for headaches. Negative for dizziness, tingling, sensory change, seizures and loss of consciousness.   Psychiatric/Behavioral: The patient is nervous/anxious.              Past Medical History (Chronic medical problem, known complications and current treatment)        Past Surgical History:  Past Surgical History:   Procedure Laterality Date   • GASTROSCOPY-ENDO N/A 10/3/2018    Procedure: GASTROSCOPY-ENDO;  Surgeon: Ben Negro M.D.;  Location: ENDOSCOPY [facility], [place];  Service: Gastroenterology   • CYSTOSCOPY STENT PLACEMENT  11/9/2010    Performed by ANGEL HARRIS at [facility]   • URETEROSCOPY  11/9/2010    Performed by ANGEL HARRIS at [facility]   • LASERTRIPSY  11/9/2010    Performed by ANGEL HARRIS at [facility]   • STENT REMOVAL  11/9/2010    Performed by ANGEL HARRIS at [facility]   • CYSTO STENT PLACEMNT PRE SURG  10/7/2010    Performed by ANGEL HARRIS at [facility]   • HERNIA REPAIR  1977       Current Outpatient Medications:  Home Medications     Reviewed by Aretha Toro (Pharmacy Tech) on 09/29/19 at 2237  Med List Status: Complete   Medication Last Dose Status   clonazePAM (KLONOPIN) 0.5 MG Tab 9/29/2019  Active   doxepin (SINEQUAN) 10 MG Cap 9/28/2019 Active   lisinopril (PRINIVIL) 10 MG Tab 9/29/2019 Active   metoprolol (LOPRESSOR) 25 MG Tab 9/29/2019 Active   QUEtiapine (SEROQUEL) 25 MG Tab 9/28/2019 Active                Medication Allergy/Sensitivities:  Allergies   Allergen Reactions   • Sulfa Drugs Vomiting   • Toradol Vomiting   • Gabapentin      Bowel incontinence     • Amoxicillin Rash     Reported by NAIDA on arrival to ED    Pt states she takes PCN 10/3         Family History (mandatory)   Family History   Problem Relation Age of Onset   • Heart Disease Father    • Hypertension Father    • Diabetes Father    • Cancer Paternal Grandmother    • Depression Other    • Lung Disease Neg Hx    • Stroke Neg Hx        Social History (mandatory)   Social History     Socioeconomic History   • Marital status:      Spouse name: Not on file   • Number of children: Not on file   • Years of education: Not on file   • Highest education level: Not on file   Occupational History   • Not on file   Social Needs   • Financial resource strain: Not on file   • Food insecurity:     Worry: Not on file     Inability: Not on file   • Transportation needs:     Medical: Not on file     Non-medical: Not on file   Tobacco Use   • Smoking status: Current Every Day Smoker     Packs/day: 0.50     Years: 20.00     Pack years: 10.00     Types: Cigarettes   • Smokeless tobacco: Never Used   • Tobacco comment: 1/2 pack per day   Substance and Sexual Activity   • Alcohol use: Yes     Comment: vodka, heavy use   • Drug use: No   • Sexual activity: Never     Partners: Male   Lifestyle   • Physical activity:     Days per week: Not on file     Minutes per session: Not on file   • Stress: Not on file   Relationships   • Social connections:     Talks on phone: Not on file     Gets together: Not on file     Attends Buddhist service: Not on file     Active member of club or organization: Not on file     Attends meetings of clubs or  "organizations: Not on file     Relationship status: Not on file   • Intimate partner violence:     Fear of current or ex partner: Not on file     Emotionally abused: Not on file     Physically abused: Not on file     Forced sexual activity: Not on file   Other Topics Concern   • Not on file   Social History Narrative    ** Merged History Encounter **          Living situation: Homeless shelter  PCP : Pcp Pt States None    Physical Exam     Vitals:    09/29/19 2009 09/29/19 2010 09/29/19 2238   BP:  138/79 148/86   Pulse:  90 88   Resp:  16 16   Temp:  36.3 °C (97.4 °F)    TempSrc:  Temporal    SpO2:  93% 95%   Weight: 74.8 kg (165 lb)     Height: 1.676 m (5' 6\")       Body mass index is 26.63 kg/m².  O2 therapy: Pulse Oximetry: 95 %, O2 Delivery: None (Room Air)    Physical Exam   Constitutional:   Limited exam since patient is non cooperative,   HENT:   Head: Normocephalic and atraumatic.   Mouth/Throat: Oropharynx is clear and moist. No oropharyngeal exudate.   Neck: Normal range of motion. Neck supple.   Cardiovascular: Normal rate, regular rhythm, normal heart sounds and intact distal pulses. Exam reveals no gallop and no friction rub.   No murmur heard.  Abdominal: She exhibits no distension and no mass. There is tenderness (suprapubic). There is no rebound and no guarding.   Musculoskeletal: She exhibits no edema, tenderness or deformity.   Neurological:   Slurred speech without any focal weakness   Psychiatric:   Mildly cooperative         Data Review       Old Records Request:   Completed  Current Records review/summary: Completed    Lab Data Review:  Recent Results (from the past 24 hour(s))   ACCU-CHEK GLUCOSE    Collection Time: 09/29/19  9:16 AM   Result Value Ref Range    Glucose - Accu-Ck 84 65 - 99 mg/dL   CBC WITH DIFFERENTIAL    Collection Time: 09/29/19  8:30 PM   Result Value Ref Range    WBC 5.7 4.8 - 10.8 K/uL    RBC 3.83 (L) 4.20 - 5.40 M/uL    Hemoglobin 11.7 (L) 12.0 - 16.0 g/dL    Hematocrit " 36.3 (L) 37.0 - 47.0 %    MCV 94.8 81.4 - 97.8 fL    MCH 30.5 27.0 - 33.0 pg    MCHC 32.2 (L) 33.6 - 35.0 g/dL    RDW 60.5 (H) 35.9 - 50.0 fL    Platelet Count 242 164 - 446 K/uL    MPV 9.6 9.0 - 12.9 fL    Neutrophils-Polys 57.60 44.00 - 72.00 %    Lymphocytes 32.70 22.00 - 41.00 %    Monocytes 6.50 0.00 - 13.40 %    Eosinophils 1.80 0.00 - 6.90 %    Basophils 1.20 0.00 - 1.80 %    Immature Granulocytes 0.20 0.00 - 0.90 %    Nucleated RBC 0.00 /100 WBC    Neutrophils (Absolute) 3.27 2.00 - 7.15 K/uL    Lymphs (Absolute) 1.86 1.00 - 4.80 K/uL    Monos (Absolute) 0.37 0.00 - 0.85 K/uL    Eos (Absolute) 0.10 0.00 - 0.51 K/uL    Baso (Absolute) 0.07 0.00 - 0.12 K/uL    Immature Granulocytes (abs) 0.01 0.00 - 0.11 K/uL    NRBC (Absolute) 0.00 K/uL   APTT    Collection Time: 09/29/19  8:30 PM   Result Value Ref Range    APTT 23.4 (L) 24.7 - 36.0 sec   PROTHROMBIN TIME (INR)    Collection Time: 09/29/19  8:30 PM   Result Value Ref Range    PT 12.9 12.0 - 14.6 sec    INR 0.95 0.87 - 1.13   LACTIC ACID    Collection Time: 09/29/19  8:30 PM   Result Value Ref Range    Lactic Acid 3.1 (H) 0.5 - 2.0 mmol/L   Comp Metabolic Panel    Collection Time: 09/29/19  8:30 PM   Result Value Ref Range    Sodium 147 (H) 135 - 145 mmol/L    Potassium 3.5 (L) 3.6 - 5.5 mmol/L    Chloride 110 96 - 112 mmol/L    Co2 24 20 - 33 mmol/L    Anion Gap 13.0 (H) 0.0 - 11.9    Glucose 107 (H) 65 - 99 mg/dL    Bun 15 8 - 22 mg/dL    Creatinine 0.88 0.50 - 1.40 mg/dL    Calcium 8.7 8.5 - 10.5 mg/dL    AST(SGOT) 39 12 - 45 U/L    ALT(SGPT) 19 2 - 50 U/L    Alkaline Phosphatase 147 (H) 30 - 99 U/L    Total Bilirubin 0.2 0.1 - 1.5 mg/dL    Albumin 4.1 3.2 - 4.9 g/dL    Total Protein 7.0 6.0 - 8.2 g/dL    Globulin 2.9 1.9 - 3.5 g/dL    A-G Ratio 1.4 g/dL   DIAGNOSTIC ALCOHOL    Collection Time: 09/29/19  8:30 PM   Result Value Ref Range    Diagnostic Alcohol 0.40 (H) 0.00 g/dL   LIPASE    Collection Time: 09/29/19  8:30 PM   Result Value Ref Range     Lipase 72 11 - 82 U/L   MAGNESIUM    Collection Time: 09/29/19  8:30 PM   Result Value Ref Range    Magnesium 1.4 (L) 1.5 - 2.5 mg/dL   ESTIMATED GFR    Collection Time: 09/29/19  8:30 PM   Result Value Ref Range    GFR If African American >60 >60 mL/min/1.73 m 2    GFR If Non African American >60 >60 mL/min/1.73 m 2   VENOUS BLOOD GAS    Collection Time: 09/29/19  8:40 PM   Result Value Ref Range    Venous Bg Ph 7.42 7.31 - 7.45    Venous Bg Pco2 34.4 (L) 41.0 - 51.0 mmHg    Venous Bg Po2 43.6 (H) 25.0 - 40.0 mmHg    Venous Bg O2 Saturation 74.5 %    Venous Bg Hco3 22 (L) 24 - 28 mmol/L    Venous Bg Base Excess -2 mmol/L    Body Temp see below Centigrade       Imaging/Procedures Review:    Independant Imaging Review: Completed  CT-HEAD W/O   Final Result      No acute intracranial abnormality.               INTERPRETING LOCATION:  [address] [place] [address], [facility]      CT-CSPINE WITHOUT PLUS RECONS   Final Result      No acute fracture or traumatic malalignment.      DX-PELVIS-1 OR 2 VIEWS   Final Result      No bony abnormality.      DX-CHEST-PORTABLE (1 VIEW)   Final Result      No acute cardiopulmonary abnormality.          Records reviewed and summarized in current documentation :  Yes  [employer] teaching service handout given to patient:  No         Assessment/Plan     * Alcohol intoxication (HCC)- (present on admission)  Assessment & Plan  Alcohol intoxication  - H/o chronic alcohol abuse/alcohol use disorder.    - Multiple prior admissions for alcohol intoxication/detoxification.  Last admissions to the ER on the 22nd and 27th of this month.  - Reports of drinking >2 pints of Vodka Daily.  - Last drink at before coming to the hospital at a.m.  - Physical exam: Alcohol smell from breath present.  Tremors present.    - BAL on admission 0.40.  - UDS pending.  - Received LESTER Plasencia in ED.  Plan:  - Admit to Telemetry as outpatient observation.    - Watch for signs of alcohol withdrawal.  If severe consider 1:1 sitter.     - [facility] protocol with lorazepam.    - Fall/aspiration/seizure precautions.   - Continue Rally bag, MV.    - PO MV, thiamine, folate, vitamin B12.    - Consider naltrexone on discharge for alcohol de-addiction.      Hypokalemia- (present on admission)  Assessment & Plan  On presentation patient's potassium level is 3.5.  Patient was given 10 mEq IV potassium in the ER.  Plan:  - We will give 40 mEq potassium p.o.  -We will follow potassium levels and replete accordingly.    Lactic acidosis  Assessment & Plan  On presentation patient has lactic acid level of 3.1.  Likely due to alcohol abuse.  Patient's BAL on presentation 0.40.  Plan:  -We will follow and trend lactic acid levels.    High anion gap metabolic acidosis  Assessment & Plan  On presentation patient has anion gap of 13.0 likely due to alcohol abuse. BAL on admission 0.40.    Plan:  -CIWA protocol ordered.  -We will follow anion gap and electrolytes.    Elevated alkaline phosphatase level- (present on admission)  Assessment & Plan  On presentation patient's alkaline phosphatase is 147.  AST and ALT are normal.  Likely secondary to alcohol abuse.  Plan:  -Will follow and monitor     Essential hypertension- (present on admission)  Assessment & Plan  On presentation patient blood pressure is noted as 138/79.    Plan:  -We will continue patient's home medications lisinopril 10 mg and metoprolol 25 mg.   -Will monitor BP.    Normocytic anemia- (present on admission)  Assessment & Plan  On presentation patient's Hb is 11.7 with a MCV of 94.8.  Likely secondary to long-term alcohol abuse.      Plan:  -Day team to work up if other cause is suspected.    Depression- (present on admission)  Assessment & Plan  Plan:  -We will continue patient's Seroquel 50 mg and Doxepin 10 mg cap.  -Follow-up with outpatient psychiatrist.          Anticipated Hospital stay: Observation admit        Quality Measures  Quality-Core Measures   Reviewed items::  EKG reviewed, Labs  reviewed, Medications reviewed and Radiology images reviewed  Velásquez catheter::  No Velásquez  DVT prophylaxis pharmacological::  Heparin    PCP: Pcp Pt States None

## 2019-10-01 ENCOUNTER — PATIENT OUTREACH (OUTPATIENT)
Dept: HEALTH INFORMATION MANAGEMENT | Facility: OTHER | Age: 59
End: 2019-10-01

## 2019-10-01 PROBLEM — R19.7 DIARRHEA: Status: ACTIVE | Noted: 2019-10-01

## 2019-10-01 LAB
ALBUMIN SERPL BCP-MCNC: 3.4 G/DL (ref 3.2–4.9)
ALBUMIN/GLOB SERPL: 1.4 G/DL
ALP SERPL-CCNC: 148 U/L (ref 30–99)
ALT SERPL-CCNC: 14 U/L (ref 2–50)
ANION GAP SERPL CALC-SCNC: 8 MMOL/L (ref 0–11.9)
AST SERPL-CCNC: 32 U/L (ref 12–45)
BASOPHILS # BLD AUTO: 1.3 % (ref 0–1.8)
BASOPHILS # BLD: 0.05 K/UL (ref 0–0.12)
BILIRUB SERPL-MCNC: 0.7 MG/DL (ref 0.1–1.5)
BUN SERPL-MCNC: 14 MG/DL (ref 8–22)
CALCIUM SERPL-MCNC: 8.5 MG/DL (ref 8.5–10.5)
CHLORIDE SERPL-SCNC: 110 MMOL/L (ref 96–112)
CO2 SERPL-SCNC: 23 MMOL/L (ref 20–33)
CREAT SERPL-MCNC: 0.58 MG/DL (ref 0.5–1.4)
EOSINOPHIL # BLD AUTO: 0.1 K/UL (ref 0–0.51)
EOSINOPHIL NFR BLD: 2.7 % (ref 0–6.9)
ERYTHROCYTE [DISTWIDTH] IN BLOOD BY AUTOMATED COUNT: 59.3 FL (ref 35.9–50)
GLOBULIN SER CALC-MCNC: 2.5 G/DL (ref 1.9–3.5)
GLUCOSE SERPL-MCNC: 80 MG/DL (ref 65–99)
HCT VFR BLD AUTO: 34.3 % (ref 37–47)
HGB BLD-MCNC: 10.7 G/DL (ref 12–16)
IMM GRANULOCYTES # BLD AUTO: 0.01 K/UL (ref 0–0.11)
IMM GRANULOCYTES NFR BLD AUTO: 0.3 % (ref 0–0.9)
LYMPHOCYTES # BLD AUTO: 1.39 K/UL (ref 1–4.8)
LYMPHOCYTES NFR BLD: 37.4 % (ref 22–41)
MAGNESIUM SERPL-MCNC: 1.6 MG/DL (ref 1.5–2.5)
MCH RBC QN AUTO: 29.2 PG (ref 27–33)
MCHC RBC AUTO-ENTMCNC: 31.2 G/DL (ref 33.6–35)
MCV RBC AUTO: 93.7 FL (ref 81.4–97.8)
MONOCYTES # BLD AUTO: 0.34 K/UL (ref 0–0.85)
MONOCYTES NFR BLD AUTO: 9.1 % (ref 0–13.4)
NEUTROPHILS # BLD AUTO: 1.83 K/UL (ref 2–7.15)
NEUTROPHILS NFR BLD: 49.2 % (ref 44–72)
NRBC # BLD AUTO: 0 K/UL
NRBC BLD-RTO: 0 /100 WBC
PHOSPHATE SERPL-MCNC: 2.9 MG/DL (ref 2.5–4.5)
PLATELET # BLD AUTO: 174 K/UL (ref 164–446)
PMV BLD AUTO: 10.2 FL (ref 9–12.9)
POTASSIUM SERPL-SCNC: 3.5 MMOL/L (ref 3.6–5.5)
PROT SERPL-MCNC: 5.9 G/DL (ref 6–8.2)
RBC # BLD AUTO: 3.66 M/UL (ref 4.2–5.4)
SODIUM SERPL-SCNC: 141 MMOL/L (ref 135–145)
WBC # BLD AUTO: 3.7 K/UL (ref 4.8–10.8)

## 2019-10-01 PROCEDURE — 36415 COLL VENOUS BLD VENIPUNCTURE: CPT

## 2019-10-01 PROCEDURE — A9270 NON-COVERED ITEM OR SERVICE: HCPCS | Performed by: STUDENT IN AN ORGANIZED HEALTH CARE EDUCATION/TRAINING PROGRAM

## 2019-10-01 PROCEDURE — 83735 ASSAY OF MAGNESIUM: CPT

## 2019-10-01 PROCEDURE — 80053 COMPREHEN METABOLIC PANEL: CPT

## 2019-10-01 PROCEDURE — 85025 COMPLETE CBC W/AUTO DIFF WBC: CPT

## 2019-10-01 PROCEDURE — 770001 HCHG ROOM/CARE - MED/SURG/GYN PRIV*

## 2019-10-01 PROCEDURE — 700111 HCHG RX REV CODE 636 W/ 250 OVERRIDE (IP): Performed by: STUDENT IN AN ORGANIZED HEALTH CARE EDUCATION/TRAINING PROGRAM

## 2019-10-01 PROCEDURE — 700105 HCHG RX REV CODE 258: Performed by: STUDENT IN AN ORGANIZED HEALTH CARE EDUCATION/TRAINING PROGRAM

## 2019-10-01 PROCEDURE — 84100 ASSAY OF PHOSPHORUS: CPT

## 2019-10-01 PROCEDURE — 700102 HCHG RX REV CODE 250 W/ 637 OVERRIDE(OP): Performed by: STUDENT IN AN ORGANIZED HEALTH CARE EDUCATION/TRAINING PROGRAM

## 2019-10-01 PROCEDURE — 99232 SBSQ HOSP IP/OBS MODERATE 35: CPT | Mod: GC | Performed by: INTERNAL MEDICINE

## 2019-10-01 RX ORDER — LOPERAMIDE HYDROCHLORIDE 2 MG/1
2 CAPSULE ORAL 4 TIMES DAILY PRN
Status: DISCONTINUED | OUTPATIENT
Start: 2019-10-01 | End: 2019-10-03 | Stop reason: HOSPADM

## 2019-10-01 RX ORDER — POTASSIUM CHLORIDE 20 MEQ/1
40 TABLET, EXTENDED RELEASE ORAL ONCE
Status: COMPLETED | OUTPATIENT
Start: 2019-10-01 | End: 2019-10-01

## 2019-10-01 RX ADMIN — DOXEPIN HYDROCHLORIDE 10 MG: 10 CAPSULE ORAL at 20:06

## 2019-10-01 RX ADMIN — MAGNESIUM GLUCONATE 500 MG ORAL TABLET 400 MG: 500 TABLET ORAL at 06:34

## 2019-10-01 RX ADMIN — HEPARIN SODIUM 5000 UNITS: 5000 INJECTION INTRAVENOUS; SUBCUTANEOUS at 05:07

## 2019-10-01 RX ADMIN — LISINOPRIL 10 MG: 10 TABLET ORAL at 05:06

## 2019-10-01 RX ADMIN — METOPROLOL TARTRATE 25 MG: 25 TABLET, FILM COATED ORAL at 17:18

## 2019-10-01 RX ADMIN — LORAZEPAM 1 MG: 1 TABLET ORAL at 16:22

## 2019-10-01 RX ADMIN — HEPARIN SODIUM 5000 UNITS: 5000 INJECTION INTRAVENOUS; SUBCUTANEOUS at 17:19

## 2019-10-01 RX ADMIN — QUETIAPINE FUMARATE 50 MG: 25 TABLET ORAL at 20:06

## 2019-10-01 RX ADMIN — LORAZEPAM 1 MG: 1 TABLET ORAL at 12:10

## 2019-10-01 RX ADMIN — LORAZEPAM 1 MG: 1 TABLET ORAL at 08:30

## 2019-10-01 RX ADMIN — FOLIC ACID 1 MG: 1 TABLET ORAL at 05:07

## 2019-10-01 RX ADMIN — LORAZEPAM 0.5 MG: 0.5 TABLET ORAL at 03:24

## 2019-10-01 RX ADMIN — THERA TABS 1 TABLET: TAB at 05:06

## 2019-10-01 RX ADMIN — LORAZEPAM 2 MG: 2 TABLET ORAL at 06:41

## 2019-10-01 RX ADMIN — LORAZEPAM 3 MG: 1 TABLET ORAL at 00:30

## 2019-10-01 RX ADMIN — SODIUM CHLORIDE: 9 INJECTION, SOLUTION INTRAVENOUS at 05:15

## 2019-10-01 RX ADMIN — MAGNESIUM GLUCONATE 500 MG ORAL TABLET 400 MG: 500 TABLET ORAL at 17:18

## 2019-10-01 RX ADMIN — ACETAMINOPHEN 650 MG: 325 TABLET, FILM COATED ORAL at 11:06

## 2019-10-01 RX ADMIN — POTASSIUM CHLORIDE 40 MEQ: 1500 TABLET, EXTENDED RELEASE ORAL at 06:34

## 2019-10-01 RX ADMIN — LORAZEPAM 1 MG: 1 TABLET ORAL at 20:06

## 2019-10-01 RX ADMIN — METOPROLOL TARTRATE 25 MG: 25 TABLET, FILM COATED ORAL at 05:06

## 2019-10-01 RX ADMIN — OMEPRAZOLE 20 MG: 20 CAPSULE, DELAYED RELEASE ORAL at 05:06

## 2019-10-01 RX ADMIN — Medication 100 MG: at 05:06

## 2019-10-01 RX ADMIN — LOPERAMIDE HYDROCHLORIDE 2 MG: 2 CAPSULE ORAL at 17:18

## 2019-10-01 SDOH — ECONOMIC STABILITY: TRANSPORTATION INSECURITY
IN THE PAST 12 MONTHS, HAS LACK OF TRANSPORTATION KEPT YOU FROM MEETINGS, WORK, OR FROM GETTING THINGS NEEDED FOR DAILY LIVING?: YES

## 2019-10-01 SDOH — ECONOMIC STABILITY: FOOD INSECURITY: WITHIN THE PAST 12 MONTHS, YOU WORRIED THAT YOUR FOOD WOULD RUN OUT BEFORE YOU GOT MONEY TO BUY MORE.: SOMETIMES TRUE

## 2019-10-01 SDOH — ECONOMIC STABILITY: FOOD INSECURITY: WITHIN THE PAST 12 MONTHS, THE FOOD YOU BOUGHT JUST DIDN'T LAST AND YOU DIDN'T HAVE MONEY TO GET MORE.: SOMETIMES TRUE

## 2019-10-01 SDOH — ECONOMIC STABILITY: INCOME INSECURITY: HOW HARD IS IT FOR YOU TO PAY FOR THE VERY BASICS LIKE FOOD, HOUSING, MEDICAL CARE, AND HEATING?: HARD

## 2019-10-01 SDOH — ECONOMIC STABILITY: TRANSPORTATION INSECURITY
IN THE PAST 12 MONTHS, HAS THE LACK OF TRANSPORTATION KEPT YOU FROM MEDICAL APPOINTMENTS OR FROM GETTING MEDICATIONS?: YES

## 2019-10-01 ASSESSMENT — LIFESTYLE VARIABLES
VISUAL DISTURBANCES: NOT PRESENT
ANXIETY: *
TREMOR: MODERATE TREMOR WITH ARMS EXTENDED
VISUAL DISTURBANCES: NOT PRESENT
HEADACHE, FULLNESS IN HEAD: MILD
VISUAL DISTURBANCES: NOT PRESENT
AUDITORY DISTURBANCES: NOT PRESENT
PAROXYSMAL SWEATS: NO SWEAT VISIBLE
TREMOR: TREMOR NOT VISIBLE BUT CAN BE FELT, FINGERTIP TO FINGERTIP
NAUSEA AND VOMITING: MILD NAUSEA WITH NO VOMITING
PAROXYSMAL SWEATS: NO SWEAT VISIBLE
TOTAL SCORE: 8
TOTAL SCORE: 9
AUDITORY DISTURBANCES: NOT PRESENT
PAROXYSMAL SWEATS: BEADS OF SWEAT OBVIOUS ON FOREHEAD
AGITATION: SOMEWHAT MORE THAN NORMAL ACTIVITY
HEADACHE, FULLNESS IN HEAD: VERY MILD
VISUAL DISTURBANCES: NOT PRESENT
AGITATION: SOMEWHAT MORE THAN NORMAL ACTIVITY
TREMOR: *
ORIENTATION AND CLOUDING OF SENSORIUM: ORIENTED AND CAN DO SERIAL ADDITIONS
AUDITORY DISTURBANCES: NOT PRESENT
AUDITORY DISTURBANCES: NOT PRESENT
TREMOR: *
NAUSEA AND VOMITING: NO NAUSEA AND NO VOMITING
TOTAL SCORE: 8
VISUAL DISTURBANCES: NOT PRESENT
AUDITORY DISTURBANCES: NOT PRESENT
NAUSEA AND VOMITING: NO NAUSEA AND NO VOMITING
PAROXYSMAL SWEATS: BARELY PERCEPTIBLE SWEATING. PALMS MOIST
HEADACHE, FULLNESS IN HEAD: NOT PRESENT
VISUAL DISTURBANCES: NOT PRESENT
ANXIETY: *
PAROXYSMAL SWEATS: NO SWEAT VISIBLE
NAUSEA AND VOMITING: MILD NAUSEA WITH NO VOMITING
AUDITORY DISTURBANCES: NOT PRESENT
TREMOR: MODERATE TREMOR WITH ARMS EXTENDED
AGITATION: SOMEWHAT MORE THAN NORMAL ACTIVITY
ANXIETY: NO ANXIETY (AT EASE)
ANXIETY: *
AUDITORY DISTURBANCES: NOT PRESENT
ORIENTATION AND CLOUDING OF SENSORIUM: ORIENTED AND CAN DO SERIAL ADDITIONS
TREMOR: *
PAROXYSMAL SWEATS: BARELY PERCEPTIBLE SWEATING. PALMS MOIST
HEADACHE, FULLNESS IN HEAD: VERY MILD
ORIENTATION AND CLOUDING OF SENSORIUM: ORIENTED AND CAN DO SERIAL ADDITIONS
ORIENTATION AND CLOUDING OF SENSORIUM: ORIENTED AND CAN DO SERIAL ADDITIONS
ANXIETY: NO ANXIETY (AT EASE)
NAUSEA AND VOMITING: NO NAUSEA AND NO VOMITING
TOTAL SCORE: MODERATE ITCHING, PINS AND NEEDLES SENSATION, BURNING OR NUMBNESS
PAROXYSMAL SWEATS: NO SWEAT VISIBLE
TOTAL SCORE: 9
NAUSEA AND VOMITING: MILD NAUSEA WITH NO VOMITING
NAUSEA AND VOMITING: NO NAUSEA AND NO VOMITING
ORIENTATION AND CLOUDING OF SENSORIUM: ORIENTED AND CAN DO SERIAL ADDITIONS
AGITATION: NORMAL ACTIVITY
ANXIETY: MILDLY ANXIOUS
TOTAL SCORE: 11
AGITATION: SOMEWHAT MORE THAN NORMAL ACTIVITY
ORIENTATION AND CLOUDING OF SENSORIUM: ORIENTED AND CAN DO SERIAL ADDITIONS
TOTAL SCORE: MODERATE ITCHING, PINS AND NEEDLES SENSATION, BURNING OR NUMBNESS
HEADACHE, FULLNESS IN HEAD: MODERATE
ANXIETY: NO ANXIETY (AT EASE)
TOTAL SCORE: 7
TREMOR: *
AGITATION: SOMEWHAT MORE THAN NORMAL ACTIVITY
ANXIETY: NO ANXIETY (AT EASE)
TOTAL SCORE: MODERATE ITCHING, PINS AND NEEDLES SENSATION, BURNING OR NUMBNESS
HEADACHE, FULLNESS IN HEAD: MODERATE
HEADACHE, FULLNESS IN HEAD: MODERATE
AUDITORY DISTURBANCES: NOT PRESENT
PAROXYSMAL SWEATS: BARELY PERCEPTIBLE SWEATING. PALMS MOIST
VISUAL DISTURBANCES: NOT PRESENT
TOTAL SCORE: 0
NAUSEA AND VOMITING: NO NAUSEA AND NO VOMITING
TREMOR: NO TREMOR
AGITATION: NORMAL ACTIVITY
VISUAL DISTURBANCES: NOT PRESENT
AGITATION: NORMAL ACTIVITY
HEADACHE, FULLNESS IN HEAD: MODERATE
TOTAL SCORE: 15
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
## 2019-10-01 NOTE — NON-PROVIDER
Internal Medicine Medical Student Note  Note Author: Alva López, Student    Name Ashleigh Marquis     1960   Age/Sex 58 y.o. female   MRN [id number]   Code Status Full Code             Reason for interval visit  (Principal Problem)   Alcohol intoxication (HCC)    Interval Problem Daily Status Update  (problem status, last 24 hours, new history, new data )   - Diarrhea throughout night  - Pt complaining of nausea and headache  - CIWA score of 11 this morning requiring 2 doses of Ativan, CIWA below 8 throughout rest of day  - Pt concerned with court-ordered appointment at 8 am tomorrow at [facility]    - Pt has no stable home to return to upon dc        Physical Exam       Vitals:    19 1817 19 1900 10/01/19 0030 10/01/19 0315   BP: 155/98 157/94 138/101 158/99   Pulse: 90 (!) 104 71 71   Resp:  18 14 16   Temp:  36.4 °C (97.6 °F) 36.6 °C (97.8 °F) 36.3 °C (97.3 °F)   TempSrc:  Temporal Axillary Temporal   SpO2:  96% 95% 99%   Weight:  77.2 kg (170 lb 3.1 oz)     Height:         Body mass index is 27.47 kg/m². Weight: 77.2 kg (170 lb 3.1 oz)  Oxygen Therapy:  Pulse Oximetry: 99 %, O2 (LPM): 0, O2 Delivery: None (Room Air)    Physical Exam   Constitutional: She is oriented to person, place, and time. She appears distressed.   HENT:   Head: Normocephalic and atraumatic.   Eyes: Pupils are equal, round, and reactive to light. Conjunctivae and EOM are normal.   Neck: Neck supple.   Cardiovascular: Normal rate and regular rhythm. Exam reveals no gallop and no friction rub.   No murmur heard.  Pulmonary/Chest: Effort normal and breath sounds normal. No respiratory distress. She has no wheezes. She has no rales.   Abdominal: Soft. Bowel sounds are normal. She exhibits no distension and no mass. There is tenderness. There is no rebound and no guarding.   Musculoskeletal: She exhibits no edema.   Neurological: She is alert and oriented to person, place, and time.   Slight tremor noted when  arms extended and abducted to level of shoulders   Skin: Skin is warm.       Results for LAKSHMI DARLING (MRN [id number]) as of 10/1/2019 16:43   Ref. Range 10/1/2019 03:19   WBC Latest Ref Range: 4.8 - 10.8 K/uL 3.7 (L)   RBC Latest Ref Range: 4.20 - 5.40 M/uL 3.66 (L)   Hemoglobin Latest Ref Range: 12.0 - 16.0 g/dL 10.7 (L)   Hematocrit Latest Ref Range: 37.0 - 47.0 % 34.3 (L)   MCV Latest Ref Range: 81.4 - 97.8 fL 93.7   MCH Latest Ref Range: 27.0 - 33.0 pg 29.2   MCHC Latest Ref Range: 33.6 - 35.0 g/dL 31.2 (L)   RDW Latest Ref Range: 35.9 - 50.0 fL 59.3 (H)   Platelet Count Latest Ref Range: 164 - 446 K/uL 174       Assessment/Plan     # Alcohol Intoxication   - H/o of alcohol use disorder and chronic alcohol use with multiple prior admissions for EtOH intoxication/detoxification.   - Expresses strong desire to quit and acknowledges need for stable housing for success   - CM following and planning for dc care, pt declining [facility] program    Plan:    - CIWA protocol with Lorazepam    - Continue Lubrium 25 mg for ppx   - Fall/aspiration/seizure precautions   - On regular diet   - IVF  ml/hr continuous   - PO thiamine, folate, Vit B12 tabs with MV tab   - Continue monitoring K, Mg and replace PRN    # Hypokalemia   - Pt still requiring K monitoring with KCl tab PRN (K 3.5)   - Continue monitoring    # Tobacco use   - Pt is at pre-contemplation stage of change   - Continue motivational interviewing for tobacco cessation     # Hypertension   - Likely 2/2 chronic alcohol use   - Continue monitoring   - Continue Lisinopril 10 mg and metoprolol 25 mg    # Depression   - Continue home meds Seroquel 50 mg and Doxepin 10 mg PO   - F/u with outpt psych    # GERD   - Continue Omeprazole 20 mg

## 2019-10-01 NOTE — PROGRESS NOTES
Received email from Lupe Bailon in response to my email sent at Pts request to inform [facility] Pt. will miss court hearing due to hospitalization.  I called Lupe to inform her we (KOLTON/JAI) will follow up with Pt. tomorrow concerning D/C

## 2019-10-01 NOTE — PROGRESS NOTES
Pt alert and oriented x4.  Pt ambulated to bathroom with SBA x1.  Tired easily but tolerated.  Pt denies pain.  Resting quietly at this time

## 2019-10-01 NOTE — PROGRESS NOTES
Patient medical status no telemetry.     Patient sleeping comfortably in bed at this time. Will continue to monitor.

## 2019-10-01 NOTE — CARE PLAN
Problem: Venous Thromboembolism (VTW)/Deep Vein Thrombosis (DVT) Prevention:  Goal: Patient will participate in Venous Thrombosis (VTE)/Deep Vein Thrombosis (DVT)Prevention Measures  Outcome: PROGRESSING AS EXPECTED  Note:   Patient receiving subQ Heparin.      Problem: Pain Management  Goal: Pain level will decrease to patient's comfort goal  Outcome: PROGRESSING AS EXPECTED  Note:   Patient c/o head, medicated with PRN tylenol and ativan. Will continue to reassess pain and the need for PRN pain medications.

## 2019-10-01 NOTE — PROGRESS NOTES
Met with Pt. At bedside; also present GUNNAR Morley.  Pt. Requested help in informing [facility] that she will not be able to attend court hearing tomorrow.      I called [facility] to inform that Pt. Is not able to attend court hearing tomorrow.  Per Zachary () I sent an email to the court to be forwarded to the  informing court that Pt. Is hospitalized therefore unable to attend court hearing tomorrow.  I also requested an extension as requested by Pt.  KOLTON/GUNNAR will follow up with Pt. Tomorrow to obtain clarification for her about court's requirement to attend a meeting at the [employer].  We will also provide additional resources to her .

## 2019-10-01 NOTE — PROGRESS NOTES
Page sent out to MD to request something for pt's diarrhea per pt's request.  Awaiting return call

## 2019-10-01 NOTE — DISCHARGE PLANNING
"Anticipated Discharge Disposition: Motel    Action: CM met with pt again today to discuss d/c planning. CM received notice prior to this that pt was upset and stating she needed to leave for a court date on the 3rd of this month.   Per bedside RN, a community care management team member stopped by to see pt and was going to call the courts.   CM met with pt at bedside. Pt states, \"its not a court date actually but a  told me I need to go to the library on Wednesday the 3rd for having an open container and other homeless people go there and they feed you lunch and stuff. \"  CM discussed with pt that tomorrow is Wednesday, but it is the 2nd, not the 3rd. Pt states that she knows the library meeting is on Wednesday so it must be the 2nd. Pt is also not sure what library she is supposed to be at.   Pt states that she knows \"the people before you are calling the court to make sure its ok I miss it though and I don't want to leave Im still detoxing.\"  Pt states that she feels she still needs a few more days in the hospital to detox.   CM discussed with pt what her plan is for when she leaves the hospital and brought up [facility] again. Pt states that she will need to go get her social security check and she plans on going to a motel. Pt states that she is not interested in [facility] because it is more of a group home setting and she doesn't want to live with anyone else. CM discussed with pt if she can stay with her dtr who lives in [place] and pt declined, reporting that \"she has her own life.\"  Pt states that she has been homeless off and on for 7 years and she \"will figure it out. I always find a place eventually.\"  When asked if she is interested in staying sober pt states, \"Well, I know I need to for my health,\" but pt doesn't have a plan and isn't interested in resources to help with this.   CM left information for [facility] and rep payees at bedside with pt, however, she declined to look at them.  CM sent tiger " text to Dr. Eaton to update her.          Barriers to Discharge:     Plan: Pt wants to d/c to a motel after getting her SS check. Pt is not concerned with the appt at the library because she states she has someone calling the court for her.

## 2019-10-01 NOTE — PROGRESS NOTES
Assumed care of patient. Patient is A&Ox4, up ambulating to bathroom with CNA. Bedside report conducted. No other needs expressed at this time. Will continue to monitor.

## 2019-10-01 NOTE — ASSESSMENT & PLAN NOTE
- Complains of multiple episodes of watery diarrhea with abdominal pain, no blood or mucus in stool, reduced now  - Afebrile, no vomiting, no mucus - less likely other infectious causes  - More likely due to alcohol withdrawal  - Loperamide PRN

## 2019-10-01 NOTE — PROGRESS NOTES
Attempted to awaken patient to re-score CIWA, patient awoke to touch and when asking CIWA questions only answered one before falling back asleep, wouldn't answer other questions. Patient is sleeping, doesn't appear to be in any distress, continuous pulse ox in place, sats maintaining mid 90s on RA. Will re-assess CIWA next time patient awakens.
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
## 2019-10-01 NOTE — PROGRESS NOTES
[facility] representative and  came by to visit pt.  Per [facility] SW she called the court and left message regarding pt being an inpatient.  They told this RN and pt that they would take care of her court orders and given up-date directly to pt.

## 2019-10-01 NOTE — PROGRESS NOTES
Received email from Lupe Bailon in response to my email sent at Pts request to inform [facility] Pt. will miss court hearing due to hospitalization.  I called Lupe to inform her we (KOLTON/JAI) will follow up with Pt. tomorrow concerning D/C

## 2019-10-02 LAB
ALBUMIN SERPL BCP-MCNC: 3.2 G/DL (ref 3.2–4.9)
ALBUMIN/GLOB SERPL: 1.2 G/DL
ALP SERPL-CCNC: 137 U/L (ref 30–99)
ALT SERPL-CCNC: 13 U/L (ref 2–50)
ANION GAP SERPL CALC-SCNC: 7 MMOL/L (ref 0–11.9)
AST SERPL-CCNC: 24 U/L (ref 12–45)
BASOPHILS # BLD AUTO: 0.8 % (ref 0–1.8)
BASOPHILS # BLD: 0.04 K/UL (ref 0–0.12)
BILIRUB SERPL-MCNC: 0.4 MG/DL (ref 0.1–1.5)
BUN SERPL-MCNC: 11 MG/DL (ref 8–22)
CALCIUM SERPL-MCNC: 8.4 MG/DL (ref 8.5–10.5)
CHLORIDE SERPL-SCNC: 109 MMOL/L (ref 96–112)
CO2 SERPL-SCNC: 24 MMOL/L (ref 20–33)
CREAT SERPL-MCNC: 0.65 MG/DL (ref 0.5–1.4)
EOSINOPHIL # BLD AUTO: 0.11 K/UL (ref 0–0.51)
EOSINOPHIL NFR BLD: 2.2 % (ref 0–6.9)
ERYTHROCYTE [DISTWIDTH] IN BLOOD BY AUTOMATED COUNT: 59.6 FL (ref 35.9–50)
GLOBULIN SER CALC-MCNC: 2.7 G/DL (ref 1.9–3.5)
GLUCOSE SERPL-MCNC: 92 MG/DL (ref 65–99)
HCT VFR BLD AUTO: 33.4 % (ref 37–47)
HGB BLD-MCNC: 10.5 G/DL (ref 12–16)
IMM GRANULOCYTES # BLD AUTO: 0.02 K/UL (ref 0–0.11)
IMM GRANULOCYTES NFR BLD AUTO: 0.4 % (ref 0–0.9)
LYMPHOCYTES # BLD AUTO: 1.3 K/UL (ref 1–4.8)
LYMPHOCYTES NFR BLD: 25.7 % (ref 22–41)
MAGNESIUM SERPL-MCNC: 1.3 MG/DL (ref 1.5–2.5)
MCH RBC QN AUTO: 29.7 PG (ref 27–33)
MCHC RBC AUTO-ENTMCNC: 31.4 G/DL (ref 33.6–35)
MCV RBC AUTO: 94.4 FL (ref 81.4–97.8)
MONOCYTES # BLD AUTO: 0.56 K/UL (ref 0–0.85)
MONOCYTES NFR BLD AUTO: 11.1 % (ref 0–13.4)
NEUTROPHILS # BLD AUTO: 3.03 K/UL (ref 2–7.15)
NEUTROPHILS NFR BLD: 59.8 % (ref 44–72)
NRBC # BLD AUTO: 0 K/UL
NRBC BLD-RTO: 0 /100 WBC
PHOSPHATE SERPL-MCNC: 3.1 MG/DL (ref 2.5–4.5)
PLATELET # BLD AUTO: 166 K/UL (ref 164–446)
PMV BLD AUTO: 10 FL (ref 9–12.9)
POTASSIUM SERPL-SCNC: 3.7 MMOL/L (ref 3.6–5.5)
PROT SERPL-MCNC: 5.9 G/DL (ref 6–8.2)
RBC # BLD AUTO: 3.54 M/UL (ref 4.2–5.4)
SODIUM SERPL-SCNC: 140 MMOL/L (ref 135–145)
WBC # BLD AUTO: 5.1 K/UL (ref 4.8–10.8)

## 2019-10-02 PROCEDURE — 700102 HCHG RX REV CODE 250 W/ 637 OVERRIDE(OP): Performed by: STUDENT IN AN ORGANIZED HEALTH CARE EDUCATION/TRAINING PROGRAM

## 2019-10-02 PROCEDURE — A9270 NON-COVERED ITEM OR SERVICE: HCPCS | Performed by: STUDENT IN AN ORGANIZED HEALTH CARE EDUCATION/TRAINING PROGRAM

## 2019-10-02 PROCEDURE — 770001 HCHG ROOM/CARE - MED/SURG/GYN PRIV*

## 2019-10-02 PROCEDURE — 85025 COMPLETE CBC W/AUTO DIFF WBC: CPT

## 2019-10-02 PROCEDURE — 700111 HCHG RX REV CODE 636 W/ 250 OVERRIDE (IP): Performed by: STUDENT IN AN ORGANIZED HEALTH CARE EDUCATION/TRAINING PROGRAM

## 2019-10-02 PROCEDURE — 83735 ASSAY OF MAGNESIUM: CPT

## 2019-10-02 PROCEDURE — 80053 COMPREHEN METABOLIC PANEL: CPT

## 2019-10-02 PROCEDURE — 99232 SBSQ HOSP IP/OBS MODERATE 35: CPT | Mod: GC | Performed by: INTERNAL MEDICINE

## 2019-10-02 PROCEDURE — 84100 ASSAY OF PHOSPHORUS: CPT

## 2019-10-02 PROCEDURE — 36415 COLL VENOUS BLD VENIPUNCTURE: CPT

## 2019-10-02 PROCEDURE — 97161 PT EVAL LOW COMPLEX 20 MIN: CPT

## 2019-10-02 RX ORDER — CHLORDIAZEPOXIDE HYDROCHLORIDE 25 MG/1
25 CAPSULE, GELATIN COATED ORAL EVERY 6 HOURS
Status: DISCONTINUED | OUTPATIENT
Start: 2019-10-02 | End: 2019-10-03 | Stop reason: HOSPADM

## 2019-10-02 RX ORDER — CLONIDINE HYDROCHLORIDE 0.1 MG/1
0.1 TABLET ORAL EVERY 4 HOURS PRN
Status: DISCONTINUED | OUTPATIENT
Start: 2019-10-02 | End: 2019-10-03 | Stop reason: HOSPADM

## 2019-10-02 RX ORDER — ONDANSETRON 4 MG/1
4 TABLET, ORALLY DISINTEGRATING ORAL EVERY 6 HOURS PRN
Status: DISCONTINUED | OUTPATIENT
Start: 2019-10-02 | End: 2019-10-03 | Stop reason: HOSPADM

## 2019-10-02 RX ORDER — MAGNESIUM SULFATE HEPTAHYDRATE 40 MG/ML
2 INJECTION, SOLUTION INTRAVENOUS ONCE
Status: COMPLETED | OUTPATIENT
Start: 2019-10-02 | End: 2019-10-02

## 2019-10-02 RX ORDER — MAGNESIUM SULFATE HEPTAHYDRATE 40 MG/ML
4 INJECTION, SOLUTION INTRAVENOUS ONCE
Status: COMPLETED | OUTPATIENT
Start: 2019-10-02 | End: 2019-10-02

## 2019-10-02 RX ORDER — FERROUS SULFATE 325(65) MG
325 TABLET ORAL
Status: DISCONTINUED | OUTPATIENT
Start: 2019-10-02 | End: 2019-10-02

## 2019-10-02 RX ORDER — MAGNESIUM SULFATE HEPTAHYDRATE 40 MG/ML
4 INJECTION, SOLUTION INTRAVENOUS ONCE
Status: DISCONTINUED | OUTPATIENT
Start: 2019-10-02 | End: 2019-10-02

## 2019-10-02 RX ORDER — POTASSIUM CHLORIDE 20 MEQ/1
20 TABLET, EXTENDED RELEASE ORAL DAILY
Status: DISCONTINUED | OUTPATIENT
Start: 2019-10-02 | End: 2019-10-03 | Stop reason: HOSPADM

## 2019-10-02 RX ADMIN — LORAZEPAM 1 MG: 1 TABLET ORAL at 12:57

## 2019-10-02 RX ADMIN — HEPARIN SODIUM 5000 UNITS: 5000 INJECTION INTRAVENOUS; SUBCUTANEOUS at 17:55

## 2019-10-02 RX ADMIN — LORAZEPAM 1 MG: 1 TABLET ORAL at 16:39

## 2019-10-02 RX ADMIN — ONDANSETRON 4 MG: 4 TABLET, ORALLY DISINTEGRATING ORAL at 09:35

## 2019-10-02 RX ADMIN — METOPROLOL TARTRATE 25 MG: 25 TABLET, FILM COATED ORAL at 04:30

## 2019-10-02 RX ADMIN — POTASSIUM CHLORIDE 20 MEQ: 1500 TABLET, EXTENDED RELEASE ORAL at 09:35

## 2019-10-02 RX ADMIN — FERROUS SULFATE TAB 325 MG (65 MG ELEMENTAL FE) 325 MG: 325 (65 FE) TAB at 06:34

## 2019-10-02 RX ADMIN — LORAZEPAM 1 MG: 1 TABLET ORAL at 00:25

## 2019-10-02 RX ADMIN — THERA TABS 1 TABLET: TAB at 04:31

## 2019-10-02 RX ADMIN — ACETAMINOPHEN 650 MG: 325 TABLET, FILM COATED ORAL at 16:41

## 2019-10-02 RX ADMIN — MAGNESIUM SULFATE IN WATER 2 G: 40 INJECTION, SOLUTION INTRAVENOUS at 11:53

## 2019-10-02 RX ADMIN — OMEPRAZOLE 20 MG: 20 CAPSULE, DELAYED RELEASE ORAL at 04:30

## 2019-10-02 RX ADMIN — LOPERAMIDE HYDROCHLORIDE 2 MG: 2 CAPSULE ORAL at 11:52

## 2019-10-02 RX ADMIN — HEPARIN SODIUM 5000 UNITS: 5000 INJECTION INTRAVENOUS; SUBCUTANEOUS at 04:31

## 2019-10-02 RX ADMIN — MAGNESIUM SULFATE IN WATER 4 G: 40 INJECTION, SOLUTION INTRAVENOUS at 06:34

## 2019-10-02 RX ADMIN — LOPERAMIDE HYDROCHLORIDE 2 MG: 2 CAPSULE ORAL at 08:13

## 2019-10-02 RX ADMIN — Medication 100 MG: at 04:31

## 2019-10-02 RX ADMIN — CHLORDIAZEPOXIDE HYDROCHLORIDE 25 MG: 25 CAPSULE ORAL at 17:55

## 2019-10-02 RX ADMIN — MAGNESIUM GLUCONATE 500 MG ORAL TABLET 400 MG: 500 TABLET ORAL at 04:31

## 2019-10-02 RX ADMIN — ACETAMINOPHEN 650 MG: 325 TABLET, FILM COATED ORAL at 00:25

## 2019-10-02 RX ADMIN — CHLORDIAZEPOXIDE HYDROCHLORIDE 25 MG: 25 CAPSULE ORAL at 11:52

## 2019-10-02 RX ADMIN — LISINOPRIL 10 MG: 10 TABLET ORAL at 04:31

## 2019-10-02 RX ADMIN — CHLORDIAZEPOXIDE HYDROCHLORIDE 25 MG: 25 CAPSULE ORAL at 08:09

## 2019-10-02 RX ADMIN — QUETIAPINE FUMARATE 50 MG: 25 TABLET ORAL at 21:30

## 2019-10-02 RX ADMIN — LORAZEPAM 1 MG: 1 TABLET ORAL at 04:30

## 2019-10-02 RX ADMIN — FOLIC ACID 1 MG: 1 TABLET ORAL at 04:31

## 2019-10-02 RX ADMIN — ACETAMINOPHEN 650 MG: 325 TABLET, FILM COATED ORAL at 21:30

## 2019-10-02 RX ADMIN — DOXEPIN HYDROCHLORIDE 10 MG: 10 CAPSULE ORAL at 21:30

## 2019-10-02 RX ADMIN — LORAZEPAM 1 MG: 1 TABLET ORAL at 09:35

## 2019-10-02 ASSESSMENT — LIFESTYLE VARIABLES
HEADACHE, FULLNESS IN HEAD: MODERATE
TOTAL SCORE: 0
AGITATION: NORMAL ACTIVITY
AUDITORY DISTURBANCES: NOT PRESENT
ANXIETY: NO ANXIETY (AT EASE)
ANXIETY: *
VISUAL DISTURBANCES: NOT PRESENT
TREMOR: TREMOR NOT VISIBLE BUT CAN BE FELT, FINGERTIP TO FINGERTIP
VISUAL DISTURBANCES: NOT PRESENT
AGITATION: NORMAL ACTIVITY
AGITATION: SOMEWHAT MORE THAN NORMAL ACTIVITY
NAUSEA AND VOMITING: MILD NAUSEA WITH NO VOMITING
ANXIETY: MILDLY ANXIOUS
NAUSEA AND VOMITING: NO NAUSEA AND NO VOMITING
VISUAL DISTURBANCES: NOT PRESENT
TOTAL SCORE: 8
TREMOR: MODERATE TREMOR WITH ARMS EXTENDED
ANXIETY: *
ANXIETY: MILDLY ANXIOUS
AUDITORY DISTURBANCES: NOT PRESENT
NAUSEA AND VOMITING: MILD NAUSEA WITH NO VOMITING
NAUSEA AND VOMITING: NO NAUSEA AND NO VOMITING
AUDITORY DISTURBANCES: NOT PRESENT
PAROXYSMAL SWEATS: NO SWEAT VISIBLE
ORIENTATION AND CLOUDING OF SENSORIUM: ORIENTED AND CAN DO SERIAL ADDITIONS
ORIENTATION AND CLOUDING OF SENSORIUM: ORIENTED AND CAN DO SERIAL ADDITIONS
NAUSEA AND VOMITING: MILD NAUSEA WITH NO VOMITING
HEADACHE, FULLNESS IN HEAD: NOT PRESENT
HEADACHE, FULLNESS IN HEAD: VERY MILD
TREMOR: NO TREMOR
TREMOR: *
AUDITORY DISTURBANCES: NOT PRESENT
VISUAL DISTURBANCES: NOT PRESENT
HEADACHE, FULLNESS IN HEAD: MILD
TOTAL SCORE: 9
ORIENTATION AND CLOUDING OF SENSORIUM: ORIENTED AND CAN DO SERIAL ADDITIONS
TOTAL SCORE: 8
ORIENTATION AND CLOUDING OF SENSORIUM: ORIENTED AND CAN DO SERIAL ADDITIONS
TOTAL SCORE: 10
TREMOR: *
ORIENTATION AND CLOUDING OF SENSORIUM: ORIENTED AND CAN DO SERIAL ADDITIONS
AGITATION: SOMEWHAT MORE THAN NORMAL ACTIVITY
AGITATION: SOMEWHAT MORE THAN NORMAL ACTIVITY
VISUAL DISTURBANCES: NOT PRESENT
PAROXYSMAL SWEATS: BARELY PERCEPTIBLE SWEATING. PALMS MOIST
TOTAL SCORE: 8
NAUSEA AND VOMITING: *
PAROXYSMAL SWEATS: BARELY PERCEPTIBLE SWEATING. PALMS MOIST
HEADACHE, FULLNESS IN HEAD: MILD
PAROXYSMAL SWEATS: BARELY PERCEPTIBLE SWEATING. PALMS MOIST
AGITATION: SOMEWHAT MORE THAN NORMAL ACTIVITY
PAROXYSMAL SWEATS: BARELY PERCEPTIBLE SWEATING. PALMS MOIST
AUDITORY DISTURBANCES: NOT PRESENT
HEADACHE, FULLNESS IN HEAD: MODERATE
ORIENTATION AND CLOUDING OF SENSORIUM: ORIENTED AND CAN DO SERIAL ADDITIONS
VISUAL DISTURBANCES: NOT PRESENT
PAROXYSMAL SWEATS: NO SWEAT VISIBLE
AUDITORY DISTURBANCES: NOT PRESENT
TREMOR: *
ANXIETY: MILDLY ANXIOUS

## 2019-10-02 ASSESSMENT — GAIT ASSESSMENTS
DISTANCE (FEET): 250
GAIT LEVEL OF ASSIST: SUPERVISED
DEVIATION: BRADYKINETIC

## 2019-10-02 ASSESSMENT — PATIENT HEALTH QUESTIONNAIRE - PHQ9
2. FEELING DOWN, DEPRESSED, IRRITABLE, OR HOPELESS: NOT AT ALL
1. LITTLE INTEREST OR PLEASURE IN DOING THINGS: NOT AT ALL
SUM OF ALL RESPONSES TO PHQ9 QUESTIONS 1 AND 2: 0

## 2019-10-02 ASSESSMENT — COGNITIVE AND FUNCTIONAL STATUS - GENERAL
MOBILITY SCORE: 24
SUGGESTED CMS G CODE MODIFIER MOBILITY: CH

## 2019-10-02 NOTE — PROGRESS NOTES
"       Internal Medicine Interval Note  Note Author: Kira Eaton M.D.     Name Ashleigh Marquis     1960   Age/Sex 58 y.o. female   MRN [id number]   Code Status Full Code     After 5PM or if no immediate response to page, please call for cross-coverage  Attending/Team:   Dr. Ortega /Agustín See Patient List for primary contact information  Call [phone] to page    1st Call - Day Intern (R1):   Dr. Eaton 2nd Call - Day Sr. Resident (R2/R3):   Dr. Duke     ID: Ms Marquis is a 57 y/o female with a significant history of alcoholism with multiple ED visits, withdrawal seizures and DT, who was admitted to the hospital for alcohol intoxication and withdrawal.    Day of Admission: 3    Reason for interval visit  (Principal Problem)   Alcohol intoxication and withdrawal    Interval Problem Daily Status Update  (24 hours, problem oriented, brief subjective history, new lab/imaging data pertinent to that problem)   - No acute events overnight  -  When asked about headache and abdominal pain, she said \"fair\". Continues to have diarrhea. She complains of tooth pain and ask for pain medication. Tremors minimal.  - She sees , psychiatrist as outpatient. She doesn't remember her next scheduled appointment.  - Yesterday some  from private company came and met her. She says her court/libary appointment was taken care of.  - Vitals: Afebrile, pulse 69 - 86, RR 16-18, -158/, 95- 98% on RA  - Labs showed WBC 5.1, Hb 10.5, Mg 1.3, K 3.7, Phosphorus 3.1, Alk phos 137    Review of Systems   Constitutional: Negative for fever, chills, diaphoresis  HENT: Negative for congestion, ear discharge, ear pain, hearing loss, sore throat and tinnitus.    Eyes: Negative for photophobia, pain, discharge and redness.   Respiratory: Negative for cough, sputum production, shortness of breath and wheezing.    Cardiovascular: Negative for chest pain, palpitations, orthopnea, leg swelling and PND. "   Gastrointestinal: Positive for nausea and some abdominal pain. Negative for vomiting, diarrhea, blood in stool, constipation, heartburn.   Genitourinary:  Negative for frequency, dysuria, hematuria and urgency.   Musculoskeletal: Negative for back pain, joint pain and neck pain.   Skin: Negative for itching and rash.   Neurological: Positive for headaches and tremors. Negative for dizziness, tingling, sensory change, seizures and loss of consciousness.   Psychiatric/Behavioral: Positive for substance abuse. No nervousness/anxiousness.    Disposition/Barriers to discharge:   Patient is homeless and lives in a shelter. She is in alcohol withdrawal and not stable for discharge today. CM working for discharge planning. Patient would like to pickup SS check and stay in motel following discharge.    Consultants/Specialty  None    PCP: Pcp Pt States None    Quality Measures  Quality-Core Measures   Reviewed items::  EKG reviewed, Labs reviewed, Medications reviewed  Velásquez catheter::  No Velásquez  DVT prophylaxis pharmacological::  Heparin    Physical Exam       Vitals:    10/01/19 1541 10/01/19 2000 10/02/19 0000 10/02/19 0400   BP: 125/79 153/90 134/82 148/89   Pulse: 73 84 86 69   Resp: 16 18 18 18   Temp: 36.1 °C (96.9 °F) 36.5 °C (97.7 °F) 36.3 °C (97.4 °F) 36.2 °C (97.2 °F)   TempSrc: Temporal      SpO2: 96% 95% 95% 95%   Weight:  76.3 kg (168 lb 3.4 oz)     Height:         Body mass index is 27.15 kg/m². Weight: 76.3 kg (168 lb 3.4 oz)  Oxygen Therapy:  Pulse Oximetry: 95 %, O2 (LPM): 0, O2 Delivery: None (Room Air)    Physical Exam   Constitutional: She is oriented to person, place, and time and well-developed, well-nourished, and in no distress.   HENT:   Head: Normocephalic and atraumatic.   Mouth/Throat: Oropharynx is clear and moist.   Eyes: Pupils are equal, round, and reactive to light. Conjunctivae and EOM are normal.   Neck: Normal range of motion. Neck supple.   Cardiovascular: Normal rate, regular rhythm  and normal heart sounds.   No murmur heard.  Pulmonary/Chest: Effort normal and breath sounds normal. She has no wheezes.   Abdominal: Soft. Bowel sounds are normal. She exhibits no mass. There is no tenderness.   Musculoskeletal: She exhibits no edema.   Lymphadenopathy:     She has no cervical adenopathy.   Neurological: She is alert and oriented to person, place, and time. She has normal reflexes. No cranial nerve deficit. Gait normal. Coordination normal.   Tremor + in both hands   Skin: No rash noted. She is not diaphoretic.   Psychiatric: Mood, memory, affect and judgment normal.     Assessment/Plan     * Alcohol intoxication (HCC)- (present on admission)  Assessment & Plan  Alcohol intoxication  - H/o chronic alcohol abuse/alcohol use disorder.    - Multiple prior admissions for alcohol intoxication/detoxification. Last admissions to the ER on the 22nd and 27th of this month.  - Reports of drinking >2 pints of Vodka Daily.  - Last drink at before coming to the hospital at a.m.  - Physical exam: Alcohol smell from breath present.  Tremors present.    - BAL on admission 0.40.  - UDS pending.  - Received LESTER Plasencia in ED  - Tele in SR at 80-85  Plan:  - Discontinue Tele & IVF  - CIWA protocol with lorazepam (17 on admission, then 5, 11, 8)  - Started librium 25 mg Q6H (day 2)  - Fall/aspiration/seizure precautions  - On regular diet  - Oral thiamine, folate, vitamin B12  - Monitoring K, PO4, Mg and replacing as needed     Hypokalemia- (present on admission)  Assessment & Plan  On presentation patient's potassium level is 3.5.  Patient was given 10 mEq IV potassium in the ER.  Plan:  - Recheck labs K at 3.5, still at 3.5  - Replaced 40 meq oral  - Will monitor    Lactic acidosis - resolved  Assessment & Plan  On presentation patient has lactic acid level of 3.1.  Likely due to alcohol abuse.  Patient's BAL on presentation 0.40.  Plan:  -Trended 3.1 -> 1.8 ->2.8 -> 1.3 -> 1.2  - Resolved      High anion gap  metabolic acidosis - Resolved  Assessment & Plan  On presentation patient has anion gap of 13.0 likely due to alcohol abuse. BAL on admission 0.40.    Plan:  - On admission 13, but 10 this morning, normal anion gap  - More likely due to alcohol intoxication    Hypomagnesemia- (present on admission)  Assessment & Plan  - Most likely due to refeeding syndrome  - Mg at 1.3 today  - Started on IV Mg 4 gm  - Will recheck and replete PRN (IV Mg 2 gm daily)    Diarrhea  Assessment & Plan  - Complains of multiple episodes of watery diarrhea with abdominal pain, no blood or mucus in stool  - Patient admitted 1 day ago, not on antibiotics and WBC 3.7 - less likely C.diff. No testing indicated at this time  - Afebrile, no vomiting, no mucus - less likely other infectious causes  - More likely due to alcohol withdrawal  - Loperamide 2 mg Q6H PRN  - Stopped Oral magnesium - can cause diarrhea    Tobacco use  Assessment & Plan  - Smokes 1/2 ppd since 25 years  - Counseled on quitting, pt not ready at this time      Elevated alkaline phosphatase level- (present on admission)  Assessment & Plan  - Alkaline phosphatase is 147.  AST and ALT are normal  - Likely secondary to alcohol abuse  - Will monitor    Essential hypertension- (present on admission)  Assessment & Plan  -Continue home medications lisinopril 10 mg and metoprolol 25 mg x 2  -Will monitor     Normocytic anemia- (present on admission)  Assessment & Plan  - On presentation patient's Hb is 11.7 with a MCV of 94.8  - Likely secondary to long-term alcohol abuse    Depression- (present on admission)  Assessment & Plan  -Continue on home meds Seroquel 50 mg and Doxepin 10 mg oral  -Follow-up with outpatient psychiatrist    GERD (gastroesophageal reflux disease)- (present on admission)  Assessment & Plan  - Continue on Omeprazole 20 mg

## 2019-10-02 NOTE — PROGRESS NOTES
Pt alert and oriented x4.  Pt c/o diarrhea and is being treated.  Pt ambulates to bathroom with SBA.  CIWA 8 at noon.  Resting quietly at this time

## 2019-10-02 NOTE — DOCUMENTATION QUERY
[facility]                                                                       Query Response Note      PATIENT:               LAKSHMI DARLING  ACCT #:                  [id number]  MRN:                     [id number]  :                      1960  ADMIT DATE:       2019 8:03 PM  DISCH DATE:          RESPONDING  PROVIDER #:        [id number]           QUERY TEXT:    Depression is documented in the Medical Record.  Please specify the type.    NOTE:  If an appropriate response is not listed below, please respond with a new note.              The patient's Clinical Indicators include:  H&P: Depression  Treatment: Seroquel, Doxepin  Risk Factors: Alcoholic, Homeless, Asthma, HTN, Anxiety  Options provided:   -- MDD, recurrent, in full remission   -- MDD, single episode, in full remission   -- MDD, recurrent, in partial remission   -- MDD, single episode, in partial remission   -- MDD, mild, single episode   -- MDD, mild, recurrent, current episode   -- MDD, moderate, single episode   -- MDD, moderate, recurrent, current episode   -- MDD, severe, single episode, without psychotic features   -- MDD, severe, single episode, with psychotic features   -- MDD, severe, recurrent, current episode, without psychotic features   -- MDD, severe, recurrent, current episode, with psychotic features   -- Situational/Grief Reaction depression   -- Unable to determine      Query created by: Adam Posadas on 10/1/2019 9:06 AM    RESPONSE TEXT:    Unable to determine          Electronically signed by:  VIJAY ASCENCIO 10/1/2019 6:38 PM

## 2019-10-02 NOTE — PROGRESS NOTES
2 RN skin check complete.   Devices in place n/a.  Skin assessed under devices n/a.  Confirmed pressure ulcers found on n/a.  New potential pressure ulcers noted on n./a. Wound consult placed n/a.  The following interventions in place q 2 turns, patient would like to shower, let CNA know*

## 2019-10-02 NOTE — CARE PLAN
Problem: Communication  Goal: The ability to communicate needs accurately and effectively will improve  Outcome: PROGRESSING AS EXPECTED     Problem: Safety  Goal: Will remain free from injury  Outcome: PROGRESSING AS EXPECTED  Goal: Will remain free from falls  Outcome: PROGRESSING AS EXPECTED     Problem: Knowledge Deficit  Goal: Knowledge of disease process/condition, treatment plan, diagnostic tests, and medications will improve  Outcome: PROGRESSING AS EXPECTED  Goal: Knowledge of the prescribed therapeutic regimen will improve  Outcome: PROGRESSING AS EXPECTED     Problem: Psychosocial Needs:  Goal: Level of anxiety will decrease  Outcome: PROGRESSING AS EXPECTED     Problem: Pain Management  Goal: Pain level will decrease to patient's comfort goal  Outcome: PROGRESSING AS EXPECTED

## 2019-10-02 NOTE — ASSESSMENT & PLAN NOTE
- Most likely due to refeeding syndrome  - repletion with PO supplements, advice to hold if diarrhea

## 2019-10-02 NOTE — CARE PLAN
Problem: Safety  Goal: Will remain free from injury  Outcome: PROGRESSING AS EXPECTED  Note:   Patient calls for assistance appropriately. Non-skid socks in place. Bed alarm in place.      Problem: Bowel/Gastric:  Goal: Normal bowel function is maintained or improved  Outcome: PROGRESSING AS EXPECTED  Note:   Patient given PRN immodium on day shift, hasn't reported diarrhea this shift.

## 2019-10-02 NOTE — PROGRESS NOTES
Assumed care of patient. Patient sleeping comfortably in bed at this time. Bedside report conducted. No needs expressed. Will continue to monitor.

## 2019-10-02 NOTE — THERAPY
"Physical Therapy Evaluation completed.   Bed Mobility:  Supine to Sit: Modified Independent  Transfers: Sit to Stand: Supervised  Gait: Level Of Assist: Supervised with No Equipment Needed       Plan of Care: Patient with no further skilled PT needs in the acute care setting at this time  Discharge Recommendations: Equipment: No Equipment Needed. Patient will not be actively followed for physical therapy services at this time, however may be seen if requested by physician for 1 more visit within 30 days to address any discharge or equipment needs    See \"Rehab Therapy-Acute\" Patient Summary Report for complete documentation.     Pt was recently admitted for txt of alcohol withdrawl symptoms. Pt was able to complete all functional mobility tasks with SPV and Mod I. Pt presented with no smith LOB and appears to be near her functional baseline with mobility. Pt was able to demonstrate with recicpricol gait pattern with no veering. Pt educated on continue to mobilize while in house to avoid deconditioning. Pt is in no acute skilled PT needs at this time, anticipate pt to d/c back to prior destination. Pt will be on d/c needs for further questions or equipment recs.   "
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
## 2019-10-02 NOTE — PROGRESS NOTES
Call from Mariza with [facility] who stated she has spoken to the court and has explained pt is in hsp and can't make appt tomorrow.  Per Mariza, court OK'd missing appt and further follow-up will be done upon discharage.

## 2019-10-03 ENCOUNTER — PATIENT OUTREACH (OUTPATIENT)
Dept: HEALTH INFORMATION MANAGEMENT | Facility: OTHER | Age: 59
End: 2019-10-03

## 2019-10-03 VITALS
RESPIRATION RATE: 18 BRPM | HEIGHT: 66 IN | OXYGEN SATURATION: 96 % | HEART RATE: 61 BPM | BODY MASS INDEX: 27.03 KG/M2 | WEIGHT: 168.21 LBS | DIASTOLIC BLOOD PRESSURE: 62 MMHG | SYSTOLIC BLOOD PRESSURE: 125 MMHG | TEMPERATURE: 97.6 F

## 2019-10-03 LAB
ALBUMIN SERPL BCP-MCNC: 3.3 G/DL (ref 3.2–4.9)
ALBUMIN/GLOB SERPL: 1.2 G/DL
ALP SERPL-CCNC: 138 U/L (ref 30–99)
ALT SERPL-CCNC: 11 U/L (ref 2–50)
ANION GAP SERPL CALC-SCNC: 7 MMOL/L (ref 0–11.9)
AST SERPL-CCNC: 23 U/L (ref 12–45)
BASOPHILS # BLD AUTO: 0.5 % (ref 0–1.8)
BASOPHILS # BLD: 0.02 K/UL (ref 0–0.12)
BILIRUB SERPL-MCNC: 0.2 MG/DL (ref 0.1–1.5)
BUN SERPL-MCNC: 17 MG/DL (ref 8–22)
CALCIUM SERPL-MCNC: 8.6 MG/DL (ref 8.5–10.5)
CHLORIDE SERPL-SCNC: 109 MMOL/L (ref 96–112)
CO2 SERPL-SCNC: 23 MMOL/L (ref 20–33)
CREAT SERPL-MCNC: 0.67 MG/DL (ref 0.5–1.4)
EOSINOPHIL # BLD AUTO: 0.11 K/UL (ref 0–0.51)
EOSINOPHIL NFR BLD: 2.7 % (ref 0–6.9)
ERYTHROCYTE [DISTWIDTH] IN BLOOD BY AUTOMATED COUNT: 61.7 FL (ref 35.9–50)
GLOBULIN SER CALC-MCNC: 2.7 G/DL (ref 1.9–3.5)
GLUCOSE SERPL-MCNC: 105 MG/DL (ref 65–99)
HCT VFR BLD AUTO: 34.9 % (ref 37–47)
HGB BLD-MCNC: 11.1 G/DL (ref 12–16)
IMM GRANULOCYTES # BLD AUTO: 0.02 K/UL (ref 0–0.11)
IMM GRANULOCYTES NFR BLD AUTO: 0.5 % (ref 0–0.9)
LYMPHOCYTES # BLD AUTO: 1.37 K/UL (ref 1–4.8)
LYMPHOCYTES NFR BLD: 34.1 % (ref 22–41)
MAGNESIUM SERPL-MCNC: 1.7 MG/DL (ref 1.5–2.5)
MCH RBC QN AUTO: 30.9 PG (ref 27–33)
MCHC RBC AUTO-ENTMCNC: 31.8 G/DL (ref 33.6–35)
MCV RBC AUTO: 97.2 FL (ref 81.4–97.8)
MONOCYTES # BLD AUTO: 0.55 K/UL (ref 0–0.85)
MONOCYTES NFR BLD AUTO: 13.7 % (ref 0–13.4)
NEUTROPHILS # BLD AUTO: 1.95 K/UL (ref 2–7.15)
NEUTROPHILS NFR BLD: 48.5 % (ref 44–72)
NRBC # BLD AUTO: 0 K/UL
NRBC BLD-RTO: 0 /100 WBC
PHOSPHATE SERPL-MCNC: 4.2 MG/DL (ref 2.5–4.5)
PLATELET # BLD AUTO: 176 K/UL (ref 164–446)
PMV BLD AUTO: 10.3 FL (ref 9–12.9)
POTASSIUM SERPL-SCNC: 4.1 MMOL/L (ref 3.6–5.5)
PROT SERPL-MCNC: 6 G/DL (ref 6–8.2)
RBC # BLD AUTO: 3.59 M/UL (ref 4.2–5.4)
SODIUM SERPL-SCNC: 139 MMOL/L (ref 135–145)
WBC # BLD AUTO: 4 K/UL (ref 4.8–10.8)

## 2019-10-03 PROCEDURE — 80053 COMPREHEN METABOLIC PANEL: CPT

## 2019-10-03 PROCEDURE — 36415 COLL VENOUS BLD VENIPUNCTURE: CPT

## 2019-10-03 PROCEDURE — 700111 HCHG RX REV CODE 636 W/ 250 OVERRIDE (IP): Performed by: STUDENT IN AN ORGANIZED HEALTH CARE EDUCATION/TRAINING PROGRAM

## 2019-10-03 PROCEDURE — 700102 HCHG RX REV CODE 250 W/ 637 OVERRIDE(OP): Performed by: STUDENT IN AN ORGANIZED HEALTH CARE EDUCATION/TRAINING PROGRAM

## 2019-10-03 PROCEDURE — 99238 HOSP IP/OBS DSCHRG MGMT 30/<: CPT | Mod: GC | Performed by: INTERNAL MEDICINE

## 2019-10-03 PROCEDURE — A9270 NON-COVERED ITEM OR SERVICE: HCPCS | Performed by: STUDENT IN AN ORGANIZED HEALTH CARE EDUCATION/TRAINING PROGRAM

## 2019-10-03 PROCEDURE — 83735 ASSAY OF MAGNESIUM: CPT

## 2019-10-03 PROCEDURE — 97165 OT EVAL LOW COMPLEX 30 MIN: CPT

## 2019-10-03 PROCEDURE — 85025 COMPLETE CBC W/AUTO DIFF WBC: CPT

## 2019-10-03 PROCEDURE — 84100 ASSAY OF PHOSPHORUS: CPT

## 2019-10-03 RX ORDER — MAGNESIUM SULFATE HEPTAHYDRATE 40 MG/ML
2 INJECTION, SOLUTION INTRAVENOUS ONCE
Status: COMPLETED | OUTPATIENT
Start: 2019-10-03 | End: 2019-10-03

## 2019-10-03 RX ORDER — POTASSIUM CHLORIDE 20 MEQ/1
20 TABLET, EXTENDED RELEASE ORAL DAILY
Qty: 60 TAB | Refills: 11 | Status: SHIPPED
Start: 2019-10-04 | End: 2020-07-20

## 2019-10-03 RX ORDER — LOPERAMIDE HYDROCHLORIDE 2 MG/1
2 CAPSULE ORAL 4 TIMES DAILY PRN
Qty: 30 CAP | Refills: 1 | Status: SHIPPED
Start: 2019-10-03 | End: 2020-07-20

## 2019-10-03 RX ORDER — ACETAMINOPHEN 325 MG/1
650 TABLET ORAL EVERY 4 HOURS PRN
Qty: 30 TAB | Refills: 0 | Status: SHIPPED
Start: 2019-10-03 | End: 2020-07-20

## 2019-10-03 RX ORDER — CALCIUM CARBONATE 300MG(750)
400 TABLET,CHEWABLE ORAL DAILY
Qty: 30 TAB | Refills: 1 | Status: SHIPPED
Start: 2019-10-03 | End: 2020-07-20

## 2019-10-03 RX ADMIN — ONDANSETRON 4 MG: 4 TABLET, ORALLY DISINTEGRATING ORAL at 11:02

## 2019-10-03 RX ADMIN — FOLIC ACID 1 MG: 1 TABLET ORAL at 05:48

## 2019-10-03 RX ADMIN — CHLORDIAZEPOXIDE HYDROCHLORIDE 25 MG: 25 CAPSULE ORAL at 05:46

## 2019-10-03 RX ADMIN — LISINOPRIL 10 MG: 10 TABLET ORAL at 05:46

## 2019-10-03 RX ADMIN — Medication 100 MG: at 05:50

## 2019-10-03 RX ADMIN — METOPROLOL TARTRATE 25 MG: 25 TABLET, FILM COATED ORAL at 05:47

## 2019-10-03 RX ADMIN — OMEPRAZOLE 20 MG: 20 CAPSULE, DELAYED RELEASE ORAL at 05:47

## 2019-10-03 RX ADMIN — HEPARIN SODIUM 5000 UNITS: 5000 INJECTION INTRAVENOUS; SUBCUTANEOUS at 05:44

## 2019-10-03 RX ADMIN — CHLORDIAZEPOXIDE HYDROCHLORIDE 25 MG: 25 CAPSULE ORAL at 00:54

## 2019-10-03 RX ADMIN — POTASSIUM CHLORIDE 20 MEQ: 1500 TABLET, EXTENDED RELEASE ORAL at 05:45

## 2019-10-03 RX ADMIN — ACETAMINOPHEN 650 MG: 325 TABLET, FILM COATED ORAL at 11:01

## 2019-10-03 RX ADMIN — THERA TABS 1 TABLET: TAB at 05:47

## 2019-10-03 RX ADMIN — MAGNESIUM SULFATE IN WATER 2 G: 40 INJECTION, SOLUTION INTRAVENOUS at 07:57

## 2019-10-03 ASSESSMENT — LIFESTYLE VARIABLES
HEADACHE, FULLNESS IN HEAD: NOT PRESENT
VISUAL DISTURBANCES: NOT PRESENT
TOTAL SCORE: 1
AGITATION: NORMAL ACTIVITY
VISUAL DISTURBANCES: NOT PRESENT
HEADACHE, FULLNESS IN HEAD: NOT PRESENT
VISUAL DISTURBANCES: NOT PRESENT
AUDITORY DISTURBANCES: NOT PRESENT
AGITATION: NORMAL ACTIVITY
HEADACHE, FULLNESS IN HEAD: MILD
ANXIETY: MILDLY ANXIOUS
TOTAL SCORE: 2
ORIENTATION AND CLOUDING OF SENSORIUM: ORIENTED AND CAN DO SERIAL ADDITIONS
ORIENTATION AND CLOUDING OF SENSORIUM: ORIENTED AND CAN DO SERIAL ADDITIONS
ANXIETY: NO ANXIETY (AT EASE)
NAUSEA AND VOMITING: NO NAUSEA AND NO VOMITING
ORIENTATION AND CLOUDING OF SENSORIUM: ORIENTED AND CAN DO SERIAL ADDITIONS
TREMOR: NO TREMOR
AUDITORY DISTURBANCES: NOT PRESENT
AGITATION: NORMAL ACTIVITY
AUDITORY DISTURBANCES: NOT PRESENT
PAROXYSMAL SWEATS: NO SWEAT VISIBLE
NAUSEA AND VOMITING: NO NAUSEA AND NO VOMITING
NAUSEA AND VOMITING: NO NAUSEA AND NO VOMITING
PAROXYSMAL SWEATS: NO SWEAT VISIBLE
TREMOR: NO TREMOR
ANXIETY: NO ANXIETY (AT EASE)
PAROXYSMAL SWEATS: NO SWEAT VISIBLE
TOTAL SCORE: 0
TREMOR: NO TREMOR

## 2019-10-03 ASSESSMENT — ACTIVITIES OF DAILY LIVING (ADL): TOILETING: INDEPENDENT

## 2019-10-03 ASSESSMENT — COGNITIVE AND FUNCTIONAL STATUS - GENERAL
HELP NEEDED FOR BATHING: A LITTLE
DAILY ACTIVITIY SCORE: 23
SUGGESTED CMS G CODE MODIFIER DAILY ACTIVITY: CI

## 2019-10-03 NOTE — PROGRESS NOTES
Assumed care this am from night shift RN. Patient resting in bed with no complaints. Magnesium IV initiatyed. Call light and personal items within reach, bed locked in low position. Bed exit alarm on. Hourly rounding in place.

## 2019-10-03 NOTE — PROGRESS NOTES
Patient discharged with refusal to sign discharge paper. Meds to beds complete and bus pass given to patient. All belongings accounted for.

## 2019-10-03 NOTE — NON-PROVIDER
Internal Medicine Medical Student Note  Note Author: Alva López, Student    Name Ashleigh Marquis     1960   Age/Sex 58 y.o. female   MRN [id number]   Code Status Full Code             Reason for interval visit  (Principal Problem)   Alcohol intoxication (HCC)    Interval Problem Daily Status Update  (problem status, last 24 hours, new history, new data )   - Pale diarrhea throughout night  - Pt complaining of nausea and headache  - CIWA score of 8 this morning requiring 1 dose of Ativan, CIWA below 8 throughout rest of day  - Pt expressing inconsistent desire to join [facility] program, but acknowledges need for more intense follow-up         Physical Exam       Vitals:    10/02/19 0000 10/02/19 0400 10/02/19 0923 10/02/19 1630   BP: 134/82 148/89 158/97 (!) 90/65   Pulse: 86 69 76 81   Resp: 18 18 17 18   Temp: 36.3 °C (97.4 °F) 36.2 °C (97.2 °F) 36.1 °C (97 °F) 36.4 °C (97.6 °F)   TempSrc:   Temporal Temporal   SpO2: 95% 95% 95% 95%   Weight:       Height:         Body mass index is 27.15 kg/m². Weight: 76.3 kg (168 lb 3.4 oz)  Oxygen Therapy:  Pulse Oximetry: 95 %, O2 Delivery: None (Room Air)    Physical Exam      Physical Exam   Constitutional: She is oriented to person, place, and time. She appears distressed.   HENT:   Head: Normocephalic and atraumatic.   Eyes: Pupils are equal, round, and reactive to light. Conjunctivae and EOM are normal.   Neck: Neck supple.   Cardiovascular: Normal rate and regular rhythm. Exam reveals no gallop and no friction rub.   No murmur heard.  Pulmonary/Chest: Effort normal and breath sounds normal. No respiratory distress. She has no wheezes. She has no rales.   Abdominal: Soft. Bowel sounds are normal. She exhibits no distension and no mass. There is tenderness. There is no rebound and no guarding.   Musculoskeletal: She exhibits no edema.   Neurological: She is alert and oriented to person, place, and time.   Slight tremor noted when arms extended and  abducted to level of shoulders   Skin: Skin is warm.       Assessment/Plan     # Alcohol Intoxication              - H/o of alcohol use disorder and chronic alcohol use with multiple prior admissions for EtOH intoxication/detoxification.              - Expresses strong desire to quit and acknowledges need for stable housing for success              - CM following and planning for dc care, pt declining [facility] program               Plan:               - CIWA protocol with Lorazepam               - Continue Librium 25 mg for ppx              - Fall/aspiration/seizure precautions              - On regular diet              - IVF  ml/hr continuous              - PO thiamine, folate, Vit B12 tabs with MV tab              - Continue monitoring K, Mg and replace PRN   - D/C magnesium and ferrous sulfate as likely exacerbating diarrhea    # Diarrhea  Likely part of withdrawal process. Magnesium and iron tablets likely exacerbating.   - D/c PO mag and ferrous sulfate     # Hypokalemia              - Pt still requiring K monitoring with KCl tab PRN (K 3.5)              - KCl 40 tab daily     # Tobacco use              - Pt is at pre-contemplation stage of change              - Continue motivational interviewing for tobacco cessation      # Hypertension              - Likely 2/2 chronic alcohol use              - Continue monitoring              - Continue Lisinopril 10 mg and metoprolol 25 mg   - Consider Clonidine patch     # Depression              - Continue home meds Seroquel 50 mg and Doxepin 10 mg PO              - F/u with outpt psych     # GERD              - Continue Omeprazole 20 mg

## 2019-10-03 NOTE — DISCHARGE PLANNING
Medicaid Meds-to-Beds: Discharge prescription orders listed below delivered to patient's bedside. Patient counseled.       Ashleigh Marquis   Home Medication Instructions TREASURE:[id number]    Printed on:10/03/19 1132   Medication Information                      acetaminophen (TYLENOL) 325 MG Tab  Take 2 Tabs by mouth every four hours as needed.             loperamide (IMODIUM) 2 MG Cap  Take 1 Cap by mouth 4 times a day as needed for Diarrhea.             Magnesium 400 MG Tab  Take 400 mg by mouth every day.             potassium chloride SA (KDUR) 20 MEQ Tab CR  Take 1 Tab by mouth every day.               Kaylyn Vuong, PharmD

## 2019-10-03 NOTE — PROGRESS NOTES
Bedside report received from nurse. Assumed care of pt. Pt resting comfortably in bed. A/Ox4, VSS All needs met. POC reviewed and white board updated. Call light in reach. Bed locked in lowest position with 2 upper bed rails up. No pain or complaints

## 2019-10-03 NOTE — CARE PLAN
Problem: Nutritional:  Goal: Achieve adequate nutritional intake  Description  Patient will consume 50% of meals   Outcome: MET     PO % x last 3 meals per chart review

## 2019-10-03 NOTE — DISCHARGE INSTRUCTIONS
Discharge Instructions    Discharged to home by bus with self. Discharged via walking, hospital escort: Refused.  Special equipment needed: Not Applicable    Be sure to schedule a follow-up appointment with your primary care doctor or any specialists as instructed.     Discharge Plan:   Smoking Cessation Offered: Patient Counseled  Influenza Vaccine Indication: Patient Refuses    I understand that a diet low in cholesterol, fat, and sodium is recommended for good health. Unless I have been given specific instructions below for another diet, I accept this instruction as my diet prescription.   Other diet: Heart healthy    Special Instructions: None    · Is patient discharged on Warfarin / Coumadin?   No     Depression / Suicide Risk    As you are discharged from this [facility] facility, it is important to learn how to keep safe from harming yourself.    Recognize the warning signs:  · Abrupt changes in personality, positive or negative- including increase in energy   · Giving away possessions  · Change in eating patterns- significant weight changes-  positive or negative  · Change in sleeping patterns- unable to sleep or sleeping all the time   · Unwillingness or inability to communicate  · Depression  · Unusual sadness, discouragement and loneliness  · Talk of wanting to die  · Neglect of personal appearance   · Rebelliousness- reckless behavior  · Withdrawal from people/activities they love  · Confusion- inability to concentrate     If you or a loved one observes any of these behaviors or has concerns about self-harm, here's what you can do:  · Talk about it- your feelings and reasons for harming yourself  · Remove any means that you might use to hurt yourself (examples: pills, rope, extension cords, firearm)  · Get professional help from the community (Mental Health, Substance Abuse, psychological counseling)  · Do not be alone:Call your Safe Contact- someone whom you trust who will be there for you.  · Call  your local CRISIS HOTLINE 004-2565 or 680-879-1946  · Call your local Children's Mobile Crisis Response Team [place] (323) 537-7886 or www.Connectivity  · Call the toll free National Suicide Prevention Hotlines   · National Suicide Prevention Lifeline 203-940-DRSF (5654)  · National Hope Line Network 800-SUICIDE (481-6151)    Alcohol Abuse and Nutrition  Alcohol abuse is any pattern of alcohol consumption that harms your health, relationships, or work. Alcohol abuse can affect how your body breaks down and absorbs nutrients from food by causing your liver to work abnormally. Additionally, many people who abuse alcohol do not eat enough carbohydrates, protein, fat, vitamins, and minerals. This can cause poor nutrition (malnutrition) and a lack of nutrients (nutrient deficiencies), which can lead to further complications.  Nutrients that are commonly lacking (deficient) among people who abuse alcohol include:  · Vitamins.  ¨ Vitamin A. This is stored in your liver. It is important for your vision, metabolism, and ability to fight off infections (immunity).  ¨ B vitamins. These include vitamins such as folate, thiamin, and niacin. These are important in new cell growth and maintenance.  ¨ Vitamin C. This plays an important role in iron absorption, wound healing, and immunity.  ¨ Vitamin D. This is produced by your liver, but you can also get vitamin D from food. Vitamin D is necessary for your body to absorb and use calcium.  · Minerals.  ¨ Calcium. This is important for your bones and your heart and blood vessel (cardiovascular) function.  ¨ Iron. This is important for blood, muscle, and nervous system functioning.  ¨ Magnesium. This plays an important role in muscle and nerve function, and it helps to control blood sugar and blood pressure.  ¨ Zinc. This is important for the normal function of your nervous system and digestive system (gastrointestinal tract).  Nutrition is an essential component of therapy  for alcohol abuse. Your health care provider or dietitian will work with you to design a plan that can help restore nutrients to your body and prevent potential complications.  What is my plan?  Your dietitian may develop a specific diet plan that is based on your condition and any other complications you may have. A diet plan will commonly include:  · A balanced diet.  ¨ Grains: 6-8 oz per day.  ¨ Vegetables: 2-3 cups per day.  ¨ Fruits: 1-2 cups per day.  ¨ Meat and other protein: 5-6 oz per day.  ¨ Dairy: 2-3 cups per day.  · Vitamin and mineral supplements.  What do I need to know about alcohol and nutrition?  · Consume foods that are high in antioxidants, such as grapes, berries, nuts, green tea, and dark green and orange vegetables. This can help to counteract some of the stress that is placed on your liver by consuming alcohol.  · Avoid food and drinks that are high in fat and sugar. Foods such as sugared soft drinks, salty snack foods, and candy contain empty calories. This means that they lack important nutrients such as protein, fiber, and vitamins.  · Eat frequent meals and snacks. Try to eat 5-6 small meals each day.  · Eat a variety of fresh fruits and vegetables each day. This will help you get plenty of water, fiber, and vitamins in your diet.  · Drink plenty of water and other clear fluids. Try to drink at least 48-64 oz (1.5-2 L) of water per day.  · If you are a vegetarian, eat a variety of protein-rich foods. Pair whole grains with plant-based proteins at meals and snacks to obtain the greatest nutrient benefit from your food. For example, eat rice with beans, put peanut butter on whole-grain toast, or eat oatmeal with sunflower seeds.  · Soak beans and whole grains overnight before cooking. This can help your body to absorb the nutrients more easily.  · Include foods fortified with vitamins and minerals in your diet. Commonly fortified foods include milk, orange juice, cereal, and bread.  · If  you are malnourished, your dietitian may recommend a high-protein, high-calorie diet. This may include:  ¨ 2,000-3,000 calories (kilocalories) per day.  ¨  grams of protein per day.  · Your health care provider may recommend a complete nutritional supplement beverage. This can help to restore calories, protein, and vitamins to your body. Depending on your condition, you may be advised to consume this instead of or in addition to meals.  · Limit your intake of caffeine. Replace drinks like coffee and black tea with decaffeinated coffee and herbal tea.  · Eat a variety of foods that are high in omega fatty acids. These include fish, nuts and seeds, and soybeans. These foods may help your liver to recover and may also stabilize your mood.  · Certain medicines may cause changes in your appetite, taste, and weight. Work with your health care provider and dietitian to make any adjustments to your medicines and diet plan.  · Include other healthy lifestyle choices in your daily routine.  ¨ Be physically active.  ¨ Get enough sleep.  ¨ Spend time doing activities that you enjoy.  · If you are unable to take in enough food and calories by mouth, your health care provider may recommend a feeding tube. This is a tube that passes through your nose and throat, directly into your stomach. Nutritional supplement beverages can be given to you through the feeding tube to help you get the nutrients you need.  · Take vitamin or mineral supplements as recommended by your health care provider.  What foods can I eat?  Grains   Enriched pasta. Enriched rice. Fortified whole-grain bread. Fortified whole-grain cereal. Barley. Brown rice. Quinoa. Millet.  Vegetables   All fresh, frozen, and canned vegetables. Spinach. Kale. Artichoke. Carrots. Winter squash and pumpkin. Sweet potatoes. Broccoli. Cabbage. Cucumbers. Tomatoes. Sweet peppers. Green beans. Peas. Corn.  Fruits   All fresh and frozen fruits. Berries. Grapes. Duong. Papaya.  Guava. Cherries. Apples. Bananas. Peaches. Plums. Pineapple. Watermelon. Cantaloupe. Oranges. Avocado.  Meats and Other Protein Sources   Beef liver. Lean beef. Pork. Fresh and canned chicken. Fresh fish. Oysters. Sardines. Canned tuna. Shrimp. Eggs with yolks. Nuts and seeds. Peanut butter. Beans and lentils. Soybeans. Tofu.  Dairy   Whole, low-fat, and nonfat milk. Whole, low-fat, and nonfat yogurt. Cottage cheese. Sour cream. Hard and soft cheeses.  Beverages   Water. Herbal tea. Decaffeinated coffee. Decaffeinated green tea. 100% fruit juice. 100% vegetable juice. Instant breakfast shakes.  Condiments   Ketchup. Mayonnaise. Mustard. Salad dressing. Barbecue sauce.  Sweets and Desserts   Sugar-free ice cream. Sugar-free pudding. Sugar-free gelatin.  Fats and Oils   Butter. Vegetable oil, flaxseed oil, olive oil, and walnut oil.  Other   Complete nutrition shakes. Protein bars. Sugar-free gum.  The items listed above may not be a complete list of recommended foods or beverages. Contact your dietitian for more options.   What foods are not recommended?  Grains   Sugar-sweetened breakfast cereals. Flavored instant oatmeal. Fried breads.  Vegetables   Breaded or deep-fried vegetables.  Fruits   Dried fruit with added sugar. Candied fruit. Canned fruit in syrup.  Meats and Other Protein Sources   Breaded or deep-fried meats.  Dairy   Flavored milks. Fried cheese curds or fried cheese sticks.  Beverages   Alcohol. Sugar-sweetened soft drinks. Sugar-sweetened tea. Caffeinated coffee and tea.  Condiments   Sugar. Honey. Agave nectar. Molasses.  Sweets and Desserts   Chocolate. Cake. Cookies. Candy.  Other   Potato chips. Pretzels. Salted nuts. Candied nuts.  The items listed above may not be a complete list of foods and beverages to avoid. Contact your dietitian for more information.   This information is not intended to replace advice given to you by your health care provider. Make sure you discuss any questions you  have with your health care provider.  Document Released: 10/12/2006 Document Revised: 04/26/2017 Document Reviewed: 07/21/2015  Elsevier Interactive Patient Education © 2017 [employer]

## 2019-10-03 NOTE — PROGRESS NOTES
Internal Medicine Interval Note  Note Author: Jake Duke M.D.     Name Ashleigh Marquis     1960   Age/Sex 58 y.o. female   MRN [id number]   Code Status Full Code     After 5PM or if no immediate response to page, please call for cross-coverage  Attending/Team:   Dr. Ortega /Agustín See Patient List for primary contact information  Call [phone] to page    1st Call - Day Intern (R1):   Dr. Eaton 2nd Call - Day Sr. Resident (R2/R3):   Dr. Duke     ID: Ms Marquis is a 59 y/o female with a significant history of alcoholism with multiple ED visits, withdrawal seizures and DT, who was admitted to the hospital for alcohol intoxication and withdrawal.    Day of Admission: 3    Reason for interval visit  (Principal Problem)   Alcohol intoxication and withdrawal    Interval Problem Daily Status Update  (24 hours, problem oriented, brief subjective history, new lab/imaging data pertinent to that problem)      - no acute overnight events  - diarrhea better  - Mg 1.7 today, repleted  - needed 4 mg Ativan past 24 hours, CIWA <5  - declines [facility] or alternatives, would like bus pass to  her SS check and then go a motel  - PCP follow up arranged  - medically clear for discharge        Review of Systems   Constitutional: Negative for fever, chills, diaphoresis  HENT: Negative for congestion, ear discharge, ear pain, hearing loss, sore throat and tinnitus.    Eyes: Negative for photophobia, pain, discharge and redness.   Respiratory: Negative for cough, sputum production, shortness of breath and wheezing.    Cardiovascular: Negative for chest pain, palpitations, orthopnea, leg swelling and PND.   Gastrointestinal: Positive for nausea and some abdominal pain. Negative for vomiting, diarrhea, blood in stool, constipation, heartburn.   Genitourinary:  Negative for frequency, dysuria, hematuria and urgency.   Musculoskeletal: Negative for back pain, joint pain and neck pain.   Skin: Negative for  itching and rash.   Neurological: Positive for headaches and tremors. Negative for dizziness, tingling, sensory change, seizures and loss of consciousness.   Psychiatric/Behavioral: Positive for substance abuse. No nervousness/anxiousness.    Disposition/Barriers to discharge:   Medically clear to discharge, she will take a bus to go  her check and then go to a motel, she has PCP follow up    Consultants/Specialty  None    PCP: Pcp Pt States None    Quality Measures  Quality-Core Measures   Reviewed items::  EKG reviewed, Labs reviewed, Medications reviewed  Velásquez catheter::  No Velásquez  DVT prophylaxis pharmacological::  Heparin    Physical Exam       Vitals:    10/02/19 1630 10/02/19 2035 10/03/19 0350 10/03/19 0807   BP: (!) 90/65 113/65 112/75 125/62   Pulse: 81 86 71 61   Resp: 18 18 18 18   Temp: 36.4 °C (97.6 °F) 36.4 °C (97.5 °F) 36.3 °C (97.4 °F) 36.4 °C (97.6 °F)   TempSrc: Temporal Temporal Temporal Temporal   SpO2: 95% 93% 93% 96%   Weight:       Height:         Body mass index is 27.15 kg/m².    Oxygen Therapy:  Pulse Oximetry: 96 %, O2 (LPM): 0, O2 Delivery: None (Room Air)    Physical Exam   Constitutional: She is oriented to person, place, and time and well-developed, well-nourished, and in no distress.   HENT:   Head: Normocephalic and atraumatic.   Mouth/Throat: Oropharynx is clear and moist.   Eyes: Pupils are equal, round, and reactive to light. Conjunctivae and EOM are normal.   Neck: Normal range of motion. Neck supple.   Cardiovascular: Normal rate, regular rhythm and normal heart sounds.   No murmur heard.  Pulmonary/Chest: Effort normal and breath sounds normal. She has no wheezes.   Abdominal: Soft. Bowel sounds are normal. She exhibits no mass. There is no tenderness.   Musculoskeletal: She exhibits no edema.   Lymphadenopathy:     She has no cervical adenopathy.   Neurological: She is alert and oriented to person, place, and time. She has normal reflexes. No cranial nerve deficit.  Gait normal. Coordination normal.   Tremor + in both hands   Skin: No rash noted. She is not diaphoretic.   Psychiatric: Mood, memory, affect and judgment normal.     Assessment/Plan     * Alcohol intoxication (HCC)- (present on admission)  Assessment & Plan  Alcohol intoxication  - H/o chronic alcohol use disorder.    - Multiple prior admissions for alcohol intoxication/detoxification. Last admissions to the ER on the 22nd and 27th of September.  - Reports of drinking >2 pints of Vodka Daily.  - Last drink at before coming to the hospital at a.m.  - Physical exam: Alcohol smell from breath present.  Tremors present on admission  - BAL on admission 0.40.  - UDS pending.  - Received Raljosephine bag, MV in ED, followed by oral vitamins and electrolyte supplementation  - no events on tele  Plan:  - On regular diet  - Oral thiamine, folate, vitamin B12  - Mg supplementation     Hypokalemia- (present on admission)  Assessment & Plan  On presentation patient's potassium level is 3.5.  Patient was given 10 mEq IV potassium in the ER.  - resolved    Lactic acidosis - resolved  Assessment & Plan  On presentation patient has lactic acid level of 3.1.  Likely due to alcohol abuse.  Patient's BAL on presentation 0.40.  Plan:  -Trended 3.1 -> 1.8 ->2.8 -> 1.3 -> 1.2  - Resolved      High anion gap metabolic acidosis - Resolved  Assessment & Plan  On presentation patient has anion gap of 13.0 likely due to alcohol abuse. BAL on admission 0.40.  - resolved    Hypomagnesemia- (present on admission)  Assessment & Plan  - Most likely due to refeeding syndrome  - repletion with PO supplements, advice to hold if diarrhea    Diarrhea  Assessment & Plan  - Complains of multiple episodes of watery diarrhea with abdominal pain, no blood or mucus in stool, reduced now  - Afebrile, no vomiting, no mucus - less likely other infectious causes  - More likely due to alcohol withdrawal  - Loperamide PRN    Tobacco use  Assessment & Plan  - Smokes 1/2  ppd since 25 years  - Counseled on quitting, pt not ready at this time      Tooth ache- (present on admission)  Assessment & Plan  - loose crown on left upper premolar, no clinical evidence of infection, no need for ABx  - Dentistry appointment to be arranged through PCP    Elevated alkaline phosphatase level- (present on admission)  Assessment & Plan  - Alkaline phosphatase is 147.  AST and ALT are normal  - Likely secondary to alcohol abuse    Essential hypertension- (present on admission)  Assessment & Plan  -Continue home medications lisinopril 10 mg and metoprolol 25 mg x 2  -Will monitor     Normocytic anemia- (present on admission)  Assessment & Plan  - On presentation patient's Hb is 11.7 with a MCV of 94.8  - Likely secondary to long-term alcohol abuse    Depression- (present on admission)  Assessment & Plan  -Continue on home meds Seroquel 50 mg and Doxepin 10 mg oral  -Follow-up with outpatient psychiatrist    GERD (gastroesophageal reflux disease)- (present on admission)  Assessment & Plan  - Continue on Omeprazole 20 mg

## 2019-10-03 NOTE — ASSESSMENT & PLAN NOTE
- loose crown on left upper premolar, no clinical evidence of infection, no need for ABx  - Dentistry appointment to be arranged through PCP

## 2019-10-03 NOTE — THERAPY
"Occupational Therapy Evaluation completed.   Functional Status:  Pt presents to [facility] following alcohol withdrawal. Pt was able to perform basic self care tasks, functional mobility and t/f's with supervision w/o AD. Pt appears to be close to her baseline and doesn't demonstrate the need for further acute OT needs at this time.   Plan of Care: Patient with no further skilled OT needs in the acute care setting at this time  Discharge Recommendations:  Equipment: No Equipment Needed. Post-acute therapy Anticipate that the patient will have no further occupational therapy needs after discharge from the hospital.     See \"Rehab Therapy-Acute\" Patient Summary Report for complete documentation.    "

## 2019-10-03 NOTE — CARE PLAN
Problem: Safety  Goal: Will remain free from injury  Outcome: PROGRESSING AS EXPECTED  Goal: Will remain free from falls  Outcome: PROGRESSING AS EXPECTED  Intervention: Implement fall precautions  Flowsheets (Taken 10/2/2019 2000)  Environmental Precautions: Treaded Slipper Socks on Patient;Personal Belongings, Wastebasket, Call Bell etc. in Easy Reach;Bed in Low Position     Problem: Knowledge Deficit  Goal: Knowledge of disease process/condition, treatment plan, diagnostic tests, and medications will improve  Outcome: PROGRESSING AS EXPECTED  Note:   Pt updated on POC, tests, and medications. Pt verbalizes understanding and has no further questions at this time. Pt educated on calling for any more questions.

## 2019-10-06 ENCOUNTER — HOSPITAL ENCOUNTER (EMERGENCY)
Facility: MEDICAL CENTER | Age: 59
End: 2019-10-06
Attending: EMERGENCY MEDICINE
Payer: MEDICAID

## 2019-10-06 VITALS
HEART RATE: 96 BPM | RESPIRATION RATE: 14 BRPM | DIASTOLIC BLOOD PRESSURE: 77 MMHG | OXYGEN SATURATION: 99 % | SYSTOLIC BLOOD PRESSURE: 125 MMHG | TEMPERATURE: 98.6 F

## 2019-10-06 VITALS
RESPIRATION RATE: 16 BRPM | BODY MASS INDEX: 26.33 KG/M2 | WEIGHT: 158 LBS | OXYGEN SATURATION: 96 % | SYSTOLIC BLOOD PRESSURE: 112 MMHG | HEIGHT: 65 IN | HEART RATE: 88 BPM | DIASTOLIC BLOOD PRESSURE: 86 MMHG | TEMPERATURE: 97.3 F

## 2019-10-06 DIAGNOSIS — F10.929 ALCOHOLIC INTOXICATION WITH COMPLICATION (HCC): ICD-10-CM

## 2019-10-06 DIAGNOSIS — F10.920 ALCOHOLIC INTOXICATION WITHOUT COMPLICATION (HCC): ICD-10-CM

## 2019-10-06 DIAGNOSIS — F10.20 ALCOHOLISM (HCC): ICD-10-CM

## 2019-10-06 LAB — POC BREATHALIZER: 0.33 PERCENT (ref 0–0.01)

## 2019-10-06 PROCEDURE — 99285 EMERGENCY DEPT VISIT HI MDM: CPT

## 2019-10-06 PROCEDURE — 99284 EMERGENCY DEPT VISIT MOD MDM: CPT

## 2019-10-06 PROCEDURE — 302970 POC BREATHALIZER

## 2019-10-06 PROCEDURE — 302970 POC BREATHALIZER: Performed by: EMERGENCY MEDICINE

## 2019-10-06 ASSESSMENT — LIFESTYLE VARIABLES: DO YOU DRINK ALCOHOL: NO

## 2019-10-06 NOTE — ED TRIAGE NOTES
Pt BIBA from home for acute alcohol intoxication. Pt also c/o generalized body pain. Hx HTN, alcohol abuse. VSS. Pt sleeping on arrival, easily arousable.

## 2019-10-06 NOTE — ED NOTES
Report received from night RN arlen, pt. In bed with eyes closed, no distress noted, safety precautions in room maintained, will continue to monitor. Care taken over at this time.

## 2019-10-06 NOTE — ED NOTES
Pt. Ambulated around blue pod, pt. Ambulated independently, pt. Denies needs or questions, pt. Given discharge paperwork and teaching. Awaiting registration prior to discharge.

## 2019-10-06 NOTE — ED PROVIDER NOTES
ED Provider Note    Scribed for Satish Ayala M.D. by Rober Naidu. 10/6/2019  4:40 AM    Primary care provider: Pcp Pt States None  Means of arrival: EMS  History obtained from: Patient  History limited by: Alcohol intoxication    CHIEF COMPLAINT  Chief Complaint   Patient presents with   • Alcohol Intoxication       HPI  Ashleigh Marquis is a 58 y.o. female who presents to the Emergency Department for evaluation of alcohol intoxication. Per nursing, patient was brought in from home. She complained of generalized body pain. Has history of hypertension and alcohol abuse. She is slurring her speech on exam at this time.    Further history is unobtainable secondary to alcohol intoxication.    REVIEW OF SYSTEMS  Pertinent positives include alcohol intoxication, generalized body pain.   See HPI for further details.     Further ROS is unobtainable secondary to alcohol intoxication.    PAST MEDICAL HISTORY   has a past medical history of Alcoholism (HCC), Anxiety, Arthritis, ASTHMA, Cancer (HCC) (1981), Chronic low back pain, Congestive heart failure (HCC), Depression, EtOH dependence (HCC), Fall, GERD (gastroesophageal reflux disease), HTN, Hypertension, Indigestion, Muscle disorder, OSTEOPOROSIS, Other specified symptom associated with female genital organs, Psychiatric disorder, PTSD (post-traumatic stress disorder), Renal disorder, Seizure (HCC), Ulcer, and Vitamin D deficiency.    SURGICAL HISTORY   has a past surgical history that includes hernia repair (1977); cysto stent placemnt pre surg (10/7/2010); cystoscopy stent placement (11/9/2010); ureteroscopy (11/9/2010); lasertripsy (11/9/2010); stent removal (11/9/2010); and gastroscopy-endo (N/A, 10/3/2018).    SOCIAL HISTORY  Social History     Tobacco Use   • Smoking status: Current Every Day Smoker     Packs/day: 0.50     Years: 20.00     Pack years: 10.00     Types: Cigarettes   • Smokeless tobacco: Never Used   • Tobacco comment: 1/2 pack per day    Substance Use Topics   • Alcohol use: Yes     Comment: vodka, heavy use   • Drug use: No      Social History     Substance and Sexual Activity   Drug Use No       FAMILY HISTORY  Family History   Problem Relation Age of Onset   • Heart Disease Father    • Hypertension Father    • Diabetes Father    • Cancer Paternal Grandmother    • Depression Other    • Lung Disease Neg Hx    • Stroke Neg Hx        CURRENT MEDICATIONS  Home Medications     Reviewed by Kassi Samuel R.N. (Registered Nurse) on 10/06/19 at 0429  Med List Status: <None>   Medication Last Dose Status   acetaminophen (TYLENOL) 325 MG Tab  Active   doxepin (SINEQUAN) 10 MG Cap  Active   lisinopril (PRINIVIL) 10 MG Tab  Active   loperamide (IMODIUM) 2 MG Cap  Active   Magnesium 400 MG Tab  Active   metoprolol (LOPRESSOR) 25 MG Tab  Active   potassium chloride SA (KDUR) 20 MEQ Tab CR  Active   QUEtiapine (SEROQUEL) 25 MG Tab  Active                ALLERGIES  Allergies   Allergen Reactions   • Sulfa Drugs Vomiting   • Toradol Vomiting   • Gabapentin      Bowel incontinence     • Amoxicillin Rash     Reported by NAIDA on arrival to ED    Pt states she takes PCN 10/3       PHYSICAL EXAM  VITAL SIGNS: /93   Pulse 85   Temp 36.4 °C (97.6 °F) (Temporal)   Resp 16   LMP 11/02/2015   SpO2 99%   Nursing note and vitals reviewed.  Constitutional: Well-developed and well-nourished. No distress. Smells of alcohol.  HENT: Head is normocephalic and atraumatic. Oropharynx is clear and moist without exudate or erythema.   Eyes: Pupils are equal, round, and reactive to light. Conjunctiva are normal.   Cardiovascular: Normal rate and regular rhythm. No murmur heard. Normal radial pulses.  Pulmonary/Chest: Breath sounds normal. No wheezes or rales.   Abdominal: Soft and non-tender. No distention    Musculoskeletal: Extremities exhibit normal range of motion without edema or tenderness.   Neurological: Slurred speech. Otherwise no focal deficits noted.  Skin:  Skin is warm and dry. No rash.   Psychiatric: Intoxicated.    COURSE & MEDICAL DECISION MAKING  Nursing notes, VS, PMSFHx reviewed in chart.     Review of past medical records shows the patient was seen here multiple times for alcohol related complaints.     4:40 AM - Patient seen and examined at bedside. Patient presents for alcohol intoxication. There is no evidence of trauma. Will continue to monitor.    The patient was observed in the emergency department until she was able to ambulate with a stable gait.  Then discharged in improved condition.    The patient will return for new or worsening symptoms and is stable at the time of discharge.    The patient is referred to a primary physician for blood pressure management, diabetic screening, and for all other preventative health concerns.    DISPOSITION:  Patient will be discharged home in stable condition.    FOLLOW UP:  [facility], Emergency Dept  [address] [facility] [address] [phone]    If symptoms worsen    [address] [address]  [phone]          OUTPATIENT MEDICATIONS:  Discharge Medication List as of 10/6/2019  8:58 AM            FINAL IMPRESSION  1. Alcoholic intoxication with complication (HCC)    2. Alcoholism (HCC)          Rober GAN (Scribe), am scribing for, and in the presence of, Satish Ayala M.D..    Electronically signed by: Rober Naidu (Scribe), 10/6/2019    Satish GAN M.D. personally performed the services described in this documentation, as scribed by Rober Naidu in my presence, and it is both accurate and complete. C    The note accurately reflects work and decisions made by me.  Satish Ayala  10/6/2019  10:55 AM

## 2019-10-07 ENCOUNTER — HOSPITAL ENCOUNTER (EMERGENCY)
Facility: MEDICAL CENTER | Age: 59
End: 2019-10-07
Attending: EMERGENCY MEDICINE
Payer: MEDICAID

## 2019-10-07 VITALS
OXYGEN SATURATION: 98 % | RESPIRATION RATE: 16 BRPM | DIASTOLIC BLOOD PRESSURE: 71 MMHG | HEART RATE: 87 BPM | TEMPERATURE: 97.9 F | SYSTOLIC BLOOD PRESSURE: 159 MMHG

## 2019-10-07 VITALS
RESPIRATION RATE: 18 BRPM | HEIGHT: 64 IN | WEIGHT: 165.34 LBS | TEMPERATURE: 97.9 F | BODY MASS INDEX: 28.23 KG/M2 | HEART RATE: 80 BPM | DIASTOLIC BLOOD PRESSURE: 70 MMHG | SYSTOLIC BLOOD PRESSURE: 137 MMHG

## 2019-10-07 DIAGNOSIS — F10.920 ALCOHOLIC INTOXICATION WITHOUT COMPLICATION (HCC): Primary | ICD-10-CM

## 2019-10-07 DIAGNOSIS — F10.929 ALCOHOLIC INTOXICATION WITH COMPLICATION (HCC): ICD-10-CM

## 2019-10-07 LAB — POC BREATHALIZER: 0.49 PERCENT (ref 0–0.01)

## 2019-10-07 PROCEDURE — 99284 EMERGENCY DEPT VISIT MOD MDM: CPT

## 2019-10-07 PROCEDURE — 302970 POC BREATHALIZER: Performed by: EMERGENCY MEDICINE

## 2019-10-07 PROCEDURE — 99285 EMERGENCY DEPT VISIT HI MDM: CPT

## 2019-10-07 PROCEDURE — 302970 POC BREATHALIZER

## 2019-10-07 ASSESSMENT — ENCOUNTER SYMPTOMS
DIZZINESS: 0
SHORTNESS OF BREATH: 0
VOMITING: 0
HEADACHES: 0
ABDOMINAL PAIN: 0

## 2019-10-07 ASSESSMENT — LIFESTYLE VARIABLES: SUBSTANCE_ABUSE: 1

## 2019-10-07 NOTE — ED PROVIDER NOTES
"ED Provider Note    Scribed for Catherine Chatman M.D. by Nav Tavares. 10/7/2019, 12:21 PM.    Primary care provider: Pcp Pt States None  Means of arrival: EMS  History obtained from: EMS  History limited by: altered mental stauts    CHIEF COMPLAINT  Chief Complaint   Patient presents with   • Alcohol Intoxication     HPI  Ashleigh Marquis is a 58 y.o. female who presents to the Emergency Department for evaluation of alcohol intoxication and complaining of hematemesis. EMS reports that the patient was found down on the side of the street. Patient is unable to provide any history and is only able to mumble that she is \"drinking herself to death\" and has been vomiting blood.    Further HPI cannot be obtained due to the patient's altered mental status.    REVIEW OF SYSTEMS  Pertinent positives include hematemesis.      Further ROS cannot be obtained due to the patient's altered mental status.    PAST MEDICAL HISTORY   has a past medical history of Alcoholism (HCC), Anxiety, Arthritis, ASTHMA, Cancer (HCC) (1981), Chronic low back pain, Congestive heart failure (HCC), Depression, EtOH dependence ([facility]), Fall, GERD (gastroesophageal reflux disease), HTN, Hypertension, Indigestion, Muscle disorder, OSTEOPOROSIS, Other specified symptom associated with female genital organs, Psychiatric disorder, PTSD (post-traumatic stress disorder), Renal disorder, Seizure (HCC), Ulcer, and Vitamin D deficiency.    SURGICAL HISTORY   has a past surgical history that includes hernia repair (1977); cysto stent placemnt pre surg (10/7/2010); cystoscopy stent placement (11/9/2010); ureteroscopy (11/9/2010); lasertripsy (11/9/2010); stent removal (11/9/2010); and gastroscopy-endo (N/A, 10/3/2018).    SOCIAL HISTORY  Social History     Tobacco Use   • Smoking status: Current Every Day Smoker     Packs/day: 0.50     Years: 20.00     Pack years: 10.00     Types: Cigarettes   • Smokeless tobacco: Never Used   • Tobacco comment: 1/2 pack per " "day   Substance Use Topics   • Alcohol use: Yes     Comment: vodka, heavy use   • Drug use: No      Social History     Substance and Sexual Activity   Drug Use No       FAMILY HISTORY  Family History   Problem Relation Age of Onset   • Heart Disease Father    • Hypertension Father    • Diabetes Father    • Cancer Paternal Grandmother    • Depression Other    • Lung Disease Neg Hx    • Stroke Neg Hx        CURRENT MEDICATIONS  No current facility-administered medications for this encounter.     Current Outpatient Medications:   •  acetaminophen (TYLENOL) 325 MG Tab, Take 2 Tabs by mouth every four hours as needed., Disp: 30 Tab, Rfl: 0  •  loperamide (IMODIUM) 2 MG Cap, Take 1 Cap by mouth 4 times a day as needed for Diarrhea., Disp: 30 Cap, Rfl: 1  •  potassium chloride SA (KDUR) 20 MEQ Tab CR, Take 1 Tab by mouth every day., Disp: 60 Tab, Rfl: 11  •  Magnesium 400 MG Tab, Take 400 mg by mouth every day., Disp: 30 Tab, Rfl: 1  •  metoprolol (LOPRESSOR) 25 MG Tab, Take 25 mg by mouth 2 times a day., Disp: , Rfl:   •  lisinopril (PRINIVIL) 10 MG Tab, Take 10 mg by mouth every day., Disp: , Rfl:   •  QUEtiapine (SEROQUEL) 25 MG Tab, Take 50 mg by mouth every bedtime., Disp: , Rfl:   •  doxepin (SINEQUAN) 10 MG Cap, Take 10 mg by mouth every evening., Disp: , Rfl:       ALLERGIES  Allergies   Allergen Reactions   • Sulfa Drugs Vomiting   • Toradol Vomiting   • Gabapentin      Bowel incontinence     • Amoxicillin Rash     Reported by NAIDA on arrival to ED    Pt states she takes PCN 10/3       PHYSICAL EXAM  VITAL SIGNS: /90   Pulse 80   Temp 36.6 °C (97.9 °F) (Temporal)   Resp 18   Ht 1.626 m (5' 4\")   Wt 75 kg (165 lb 5.5 oz)   LMP 11/02/2015   BMI 28.38 kg/m²     Constitutional: Alert. Disheveled. Intoxicated. Slurring speech.   HENT: No signs of trauma, Bilateral external ears normal, Nose normal.   Eyes: Pupils are equal and reactive, Conjunctiva normal, Non-icteric.   Neck: Normal range of motion, No " tenderness, Supple, No stridor.   Cardiovascular: Regular rate and rhythm, no murmurs.   Thorax & Lungs: Normal breath sounds, No respiratory distress, No wheezing, No chest tenderness.   Abdomen: Bowel sounds normal, Soft, No tenderness, No masses, No peritoneal signs.  Skin: Warm, Dry, No erythema, No rash.   Musculoskeletal:  No major deformities noted.  Neurologic: Alert, moving all extremities without difficulty, no focal deficits. Slurring words.   Rectal: Negative for occult blood.     COURSE & MEDICAL DECISION MAKING  Pertinent Labs & Imaging studies reviewed. (See chart for details)    Review of past medical records shows the patient has an extensive history of alcohol abuse. She last evaluated this morning at 5 AM by Dr. Light and was discharged.      Differential diagnoses include but are not limited to: alcohol intoxication, alcohol abuse    12:21 PM - Patient seen and examined at bedside.    12:28 PM - Preformed rectal exam to evaluate her medical complaints. See above for details. She will be observed until clinically sober.     2:32 PM - Nurse informs that the patient has absconded from the department.     Decision Making:  This is a 58 y.o. year old female who presents with alcohol intoxication.  Patient is well-known to this emergency department.  She was complaining of vomiting blood however rectal exam was negative.  She was allowed to sober.  However she proceeded to elope she was noted by bystanders to be steady on her feet.  She is left the emergency department at this time.    DISPOSITION:  Patient has absconded from the department.    FINAL IMPRESSION  1. Alcoholic intoxication with complication (HCC)         This dictation has been created using voice recognition software and/or scribes. The accuracy of the dictation is limited by the abilities of the software and the expertise of the scribes. I expect there may be some errors of grammar and possibly content. I made every attempt to  manually correct the errors within my dictation. However, errors related to voice recognition software and/or scribes may still exist and should be interpreted within the appropriate context.     I, Nav Tavares (Scribe), am scribing for, and in the presence of, Catherine Chatman M.D..    Electronically signed by: Nav Tavares (Scribe), 10/7/2019    I, Catherine Chatman M.D. personally performed the services described in this documentation, as scribed by Nav Tavares in my presence, and it is both accurate and complete. C    The note accurately reflects work and decisions made by me.  Catherine Chatman  10/7/2019  2:35 PM

## 2019-10-07 NOTE — ED NOTES
Pt has eloped.  She was in room and 15 mins later was not in there.  Pt was able to ambulate with steady gait to rest room and back under her own power.

## 2019-10-07 NOTE — ED TRIAGE NOTES
Pt was found on [facility] and [address] on the ground intoxicated.  Pt has been discharged from department approximately 5 hours prior for similar complaint.

## 2019-10-07 NOTE — ED NOTES
The patient presents to the ED via EMS for evaluation of altered mental status in the setting of acute alcohol intoxication. The patient has utilized emergency services five times in past 72 hours for similar complaints. The patient reports that she fell from a standing position onto her face. The patient does not have any visible abrasions to her face or extremities. For this reason the patient presents for evaluation.

## 2019-10-07 NOTE — ED NOTES
No acute change in condition since last entry.  Pt continues to rest in bed with eyes closed.  Pt requested something to drink, given ice water.  Pt able to drink and keep fluids down.  Pt up to rest room under her own power.

## 2019-10-07 NOTE — ED PROVIDER NOTES
ED Provider Note    6:54 AM    Subjective: I was called to the bedside because the patient requested to leave.  She says that she just drank too much last night.  She says she knows that she needs to stop, but does not have any definitive plan to do so.  States that she will be going to the shelter when she is discharged.  She was hungry here and was given some food which she ate without difficulty.  She was up and ambulatory while conversing.    Objective: /75   Pulse 89   Temp 36.6 °C (97.9 °F) (Temporal)   Resp 16   LMP 11/02/2015   SpO2 97% .  Calm and cooperative.  Ambulatory.  Relatively steady gait.  No respiratory distress. No abdominal tenderness. No skin rash. Extremities unremarkable.  Denies suicidal homicidal ideation      Assesment/Plan:   1.  Alcohol intoxication.  Patient says that she is now sober and wants to go home.  She now appears safe for discharge.  Given her vague statements about wanting to stop drinking it is unlikely that she will do so.  She did not want any help with alcohol detox.  She does have a plan for the future she is not suicidal.  She will be discharged and advised to return the ER for concerning medical symptoms.      Electronically signed by: Romero Light, 10/7/2019 6:54 AM

## 2019-10-07 NOTE — ED NOTES
Contacted ERP to complete discharge paperwork. Completed vitals. Pt states she is missing a grey jacket, pt insistent jacket came with her during transport by EMS. There is no jacket in her room, no belongings were placed in locker. Pt states she was picked up from a motel, but when questioned whether the jacket could still be in motel room, pt insistent it arrived with her at hospital.   Pt was given jacket from donations.   Discharge instructions given to pt, pt verbalized understanding. VSS. No prescriptions. All personal belongings accounted for except grey jacket- offered to contact pt if jacket was found, but pt doesn't have a cell phone. Pt ambulated safely. No IV.

## 2019-10-07 NOTE — ED PROVIDER NOTES
ED Provider Note   10/7/2019  4:53 AM    Means of Arrival: EMS  History obtained by: patient  Limitations: intoxication    CHIEF COMPLAINT  Chief Complaint   Patient presents with   • ALOC   • GLF       HPI  Ashleigh Marquis is a 58 y.o. female with well-known history of alcoholism, frequent visits for alcohol intoxication, this is her third visit 24 hours for alcohol intoxication she is here via EMS after she says she fell while standing up.  She is concerned that she may have broken her nose.  No nosebleeding.    REVIEW OF SYSTEMS  Review of Systems   Respiratory: Negative for shortness of breath.    Cardiovascular: Negative for chest pain.   Gastrointestinal: Negative for abdominal pain and vomiting.   Neurological: Negative for dizziness and headaches.   Psychiatric/Behavioral: Positive for substance abuse.   All other systems reviewed and are negative.    See HPI for further details.     PAST MEDICAL HISTORY   has a past medical history of Alcoholism ([facility]), Anxiety, Arthritis, ASTHMA, Cancer (HCC) (1981), Chronic low back pain, Congestive heart failure ([facility]), Depression, EtOH dependence ([facility]), Fall, GERD (gastroesophageal reflux disease), HTN, Hypertension, Indigestion, Muscle disorder, OSTEOPOROSIS, Other specified symptom associated with female genital organs, Psychiatric disorder, PTSD (post-traumatic stress disorder), Renal disorder, Seizure ([facility]), Ulcer, and Vitamin D deficiency.    SOCIAL HISTORY  Social History     Tobacco Use   • Smoking status: Current Every Day Smoker     Packs/day: 0.50     Years: 20.00     Pack years: 10.00     Types: Cigarettes   • Smokeless tobacco: Never Used   • Tobacco comment: 1/2 pack per day   Substance and Sexual Activity   • Alcohol use: Yes     Comment: vodka, heavy use   • Drug use: No   • Sexual activity: Never     Partners: Male       SURGICAL HISTORY   has a past surgical history that includes hernia repair (1977); cysto stent placemnt pre surg (10/7/2010);  cystoscopy stent placement (11/9/2010); ureteroscopy (11/9/2010); lasertripsy (11/9/2010); stent removal (11/9/2010); and gastroscopy-endo (N/A, 10/3/2018).    CURRENT MEDICATIONS  Home Medications    **Home medications have not yet been reviewed for this encounter**         ALLERGIES  Allergies   Allergen Reactions   • Sulfa Drugs Vomiting   • Toradol Vomiting   • Gabapentin      Bowel incontinence     • Amoxicillin Rash     Reported by NAIDA on arrival to ED    Pt states she takes PCN 10/3       PHYSICAL EXAM  VITAL SIGNS: /75   Pulse 89   Temp 36.6 °C (97.9 °F) (Temporal)   Resp 16   LMP 11/02/2015   SpO2 97%    Pulse ox interpretation: I interpret this pulse ox as normal.  Constitutional: Awakens to voice.  No obvious signs of trauma.  HENT: Normocephalic, Atraumatic, Bilateral external ears normal. Nose normal.  No nasal swelling.  No blood in nares.  Eyes: Pupils are equal. Conjunctiva normal, non-icteric.   Heart: Regular rate and rhythm, no murmurs.    Lungs: No respiratory distress, regular respirations. Clear to auscultation bilaterally.  Abdomen: Normal appearance, nondistended, nontender.  Skin: Warm, Dry, No erythema, No rash.   Neurologic: Alert oriented to situation.  Speech is slurred.  Follows commands. Truncal ataxia.   MSK: Moving all extremities without pain.  Psychiatric: Calm. Denies suicidal intentions or thoughts.   Physical Exam      COURSE & MEDICAL DECISION MAKING  Pertinent Labs & Imaging studies reviewed. (See chart for details)    4:53 AM This is an emergent evaluation of a 58 y.o., female who presents with clear signs and symptoms of alcohol intoxication.  There are no obvious signs of trauma at her head.  I have considered possible intracranial injury however I suspect most likely her slurred speech is due to alcohol intoxication.  We will continue to monitor here.  If any changes in neurologic status she may need imaging, however at this time I believe she is stable for  observation here in the emergency department until she achieves a clinically sober state.    6:39 AM  Doing well. Sitting up in bed and starting to appear more sober.  Dr. Light, ERP, has assumed care and  will re-evaluate for discharge later this morning.       FINAL IMPRESSION  1. Alcoholic intoxication without complication (HCC)               Electronically signed by: Stefan Villafana II, 10/7/2019 4:53 AM

## 2019-10-07 NOTE — ED NOTES
Patient resting comfortably. Chest rise and fall noted. Call light within reach.Will Continue to monitor.

## 2019-10-07 NOTE — ED NOTES
Patient discharged home. Patient ate and drank without complication. Patient ambulated with a steady gait out of the department. Patient escorted by nursing staff.

## 2019-10-07 NOTE — ED NOTES
Received report from TETO Wilson. Per Steve's report pt is allowed to d/c when she is dressed- pt requesting to d/c right away.

## 2019-10-07 NOTE — ED PROVIDER NOTES
ED Provider Note    CHIEF COMPLAINT  Chief Complaint   Patient presents with   • ALOC     Strong odor of etoh       HPI  Ashleigh Marquis is a 58 y.o. female who presents with slurred speech and odor of alcohol.  She is somnolent though does admit to drinking heavily earlier in the day.  She states that she drank too much and vomited several times.  Has a known history of alcohol dependence and frequent visits to this emergency department.  She was just discharged from the emergency department earlier in the day a few hours ago for similar presentation.  She was seen earlier in the day for alcohol intoxication as well.  Denies any trauma.  Denies any attempts at harming herself.    The patient denies any suicidal or homicidal ideation.  History is rather limited however due to the patient's apparent intoxicated status.    REVIEW OF SYSTEMS  See HPI for further details.  Limited secondary to alcohol intoxication    PAST MEDICAL HISTORY   has a past medical history of Alcoholism ([facility]), Anxiety, Arthritis, ASTHMA, Cancer (HCC) (1981), Chronic low back pain, Congestive heart failure ([facility]), Depression, EtOH dependence ([facility]), Fall, GERD (gastroesophageal reflux disease), HTN, Hypertension, Indigestion, Muscle disorder, OSTEOPOROSIS, Other specified symptom associated with female genital organs, Psychiatric disorder, PTSD (post-traumatic stress disorder), Renal disorder, Seizure ([facility]), Ulcer, and Vitamin D deficiency.    SOCIAL HISTORY  Social History     Tobacco Use   • Smoking status: Current Every Day Smoker     Packs/day: 0.50     Years: 20.00     Pack years: 10.00     Types: Cigarettes   • Smokeless tobacco: Never Used   • Tobacco comment: 1/2 pack per day   Substance and Sexual Activity   • Alcohol use: Yes     Comment: vodka, heavy use   • Drug use: No   • Sexual activity: Never     Partners: Male       SURGICAL HISTORY   has a past surgical history that includes hernia repair (1977); cysto stent placemnt pre  "surg (10/7/2010); cystoscopy stent placement (11/9/2010); ureteroscopy (11/9/2010); lasertripsy (11/9/2010); stent removal (11/9/2010); and gastroscopy-endo (N/A, 10/3/2018).    CURRENT MEDICATIONS  Home Medications    **Home medications have not yet been reviewed for this encounter**         ALLERGIES  Allergies   Allergen Reactions   • Sulfa Drugs Vomiting   • Toradol Vomiting   • Gabapentin      Bowel incontinence     • Amoxicillin Rash     Reported by NAIDA on arrival to ED    Pt states she takes PCN 10/3       PHYSICAL EXAM  VITAL SIGNS: /78   Pulse 86   Temp 36.6 °C (97.9 °F) (Tympanic)   Resp 16   Ht 1.651 m (5' 5\")   Wt 71.7 kg (158 lb)   LMP 11/02/2015   SpO2 91%   BMI 26.29 kg/m²   Pulse ox interpretation: I interpret this pulse ox as normal.  Constitutional: Alert in no apparent distress.  HENT: No signs of trauma, Bilateral external ears normal, Nose normal.  Posterior pharynx unremarkable.  Eyes: Pupils are equal and reactive, Conjunctiva normal, Non-icteric.   Neck: Normal range of motion, No tenderness, Supple, No stridor.   Cardiovascular: Regular rate and rhythm.   Thorax & Lungs: Normal breath sounds, No respiratory distress, No wheezing, No chest tenderness.   Abdomen: Bowel sounds normal, Soft, No tenderness, No masses, No pulsatile masses. No peritoneal signs.  Skin: Warm, Dry, No erythema, No rash.   Extremities: Intact distal pulses, No edema, No tenderness, No cyanosis  Neurologic: Somnolent, odor of alcohol, slurred speech, No focal deficits noted.       DIAGNOSTIC STUDIES / PROCEDURES      LABS  Labs Reviewed   POC BREATHALIZER - Abnormal; Notable for the following components:       Result Value    POC Breathalizer 0.33 (*)     All other components within normal limits         COURSE & MEDICAL DECISION MAKING    Medications - No data to display    Pertinent Labs & Imaging studies reviewed. (See chart for details)  58 y.o. female presenting with alcohol intoxication.  She was " "monitored here in the emergency department.  Had improvement in her mental state.  Ambulating without any difficulty with a steady gait.  The patient is well-known to this emergency department and has several visits for alcohol intoxication.  She stated that she wanted to leave and so she was discharged in stable condition.  The patient ambulated out of the emergency department with a steady gait without any other complications.  Patient did not report any significant medical complaints during her stay.  Had a mild headache as she was metabolizing the alcohol in her system likely consistent with a hangover.  No signs of head trauma.  No active vomiting here in the emergency department.  No focal neurologic deficit that cannot be attributed to alcohol intoxication.  The patient is hemodynamically stable without obvious physical exam abnormalities and without active medical complaints.    The patient was instructed to follow-up with primary care physician for further management.  To return immediately for any worsening symptoms or development of any other concerning signs or symptoms. The patient verbalizes understanding in their own words.    /86   Pulse 88   Temp 36.3 °C (97.3 °F) (Temporal)   Resp 16   Ht 1.651 m (5' 5\")   Wt 71.7 kg (158 lb)   LMP 11/02/2015   SpO2 96%   BMI 26.29 kg/m²     The patient was referred to primary care where they will receive further BP management.      [facility], Emergency Dept  [address] [address] [phone]    As needed, If symptoms worsen    Primary care doctor    Schedule an appointment as soon as possible for a visit         FINAL IMPRESSION  1. Alcoholic intoxication without complication (HCC)            Electronically signed by: Edwardo Goldstein, 10/6/2019 5:57 PM    "

## 2019-10-10 ENCOUNTER — HOSPITAL ENCOUNTER (EMERGENCY)
Facility: MEDICAL CENTER | Age: 59
End: 2019-10-10
Attending: EMERGENCY MEDICINE
Payer: MEDICAID

## 2019-10-10 VITALS
SYSTOLIC BLOOD PRESSURE: 142 MMHG | WEIGHT: 165 LBS | HEART RATE: 101 BPM | OXYGEN SATURATION: 96 % | HEIGHT: 64 IN | DIASTOLIC BLOOD PRESSURE: 83 MMHG | TEMPERATURE: 97.9 F | BODY MASS INDEX: 28.17 KG/M2 | RESPIRATION RATE: 16 BRPM

## 2019-10-10 DIAGNOSIS — F10.929 ACUTE ALCOHOLIC INTOXICATION WITH COMPLICATION (HCC): ICD-10-CM

## 2019-10-10 DIAGNOSIS — F10.10 ALCOHOL ABUSE: ICD-10-CM

## 2019-10-10 PROCEDURE — 99285 EMERGENCY DEPT VISIT HI MDM: CPT

## 2019-10-10 ASSESSMENT — LIFESTYLE VARIABLES
HEADACHE, FULLNESS IN HEAD: MILD
ORIENTATION AND CLOUDING OF SENSORIUM: CANNOT DO SERIAL ADDITIONS OR IS UNCERTAIN ABOUT DATE
CONSUMPTION TOTAL: POSITIVE
HOW MANY TIMES IN THE PAST YEAR HAVE YOU HAD 5 OR MORE DRINKS IN A DAY: 200
ON A TYPICAL DAY WHEN YOU DRINK ALCOHOL HOW MANY DRINKS DO YOU HAVE: 7
DOES PATIENT WANT TO STOP DRINKING: YES
HAVE PEOPLE ANNOYED YOU BY CRITICIZING YOUR DRINKING: NO
AUDITORY DISTURBANCES: NOT PRESENT
DO YOU DRINK ALCOHOL: YES
PAROXYSMAL SWEATS: NO SWEAT VISIBLE
TOTAL SCORE: 2
TOTAL SCORE: 6
EVER FELT BAD OR GUILTY ABOUT YOUR DRINKING: NO
VISUAL DISTURBANCES: NOT PRESENT
EVER HAD A DRINK FIRST THING IN THE MORNING TO STEADY YOUR NERVES TO GET RID OF A HANGOVER: YES
TOTAL SCORE: 2
TREMOR: NO TREMOR
AVERAGE NUMBER OF DAYS PER WEEK YOU HAVE A DRINK CONTAINING ALCOHOL: 7
NAUSEA AND VOMITING: MILD NAUSEA WITH NO VOMITING
TOTAL SCORE: 2
AGITATION: SOMEWHAT MORE THAN NORMAL ACTIVITY
ANXIETY: MILDLY ANXIOUS
HAVE YOU EVER FELT YOU SHOULD CUT DOWN ON YOUR DRINKING: YES

## 2019-10-10 NOTE — ED NOTES
Discussed Discharge instructions, F/U instructions, and medication instructions with Pt. Questions answered. Pt escorted out of ED in stable condition. Pt ambulating well without assistance.

## 2019-10-10 NOTE — DISCHARGE INSTRUCTIONS
Please consider getting sober by going to detox at the [facility] detox center on [address].  You can walk in and be assessed and if they have a bed may be accepted for detox.    Return to the ER for any headache, nausea, vomiting, abdominal pain, tremulousness, or for any concerns.    Follow-up with the community health [place] [facility] or the [facility] clinic within the next 1 to 2 days.  Please call for appointment.

## 2019-10-10 NOTE — ED TRIAGE NOTES
Chief Complaint   Patient presents with   • ETOH Withdrawal     Wants to detox     [facility], picked up from [facility]. Bp 195/200, pulse 88, RR 18, 96% on room air.

## 2019-10-10 NOTE — ED PROVIDER NOTES
ED PROVIDER NOTE    Scribed for Luana Orta M.D. by Tiffany Vang. 10/10/2019, 7:29 AM.    This is an addendum to the note on Ashleigh Marquis. For further details and full chart entry, see the previously signed ED Provider Note written by Dr. Flores (ERP).      7:29 AM I discussed the patient's case with Dr. Flores (ERP) who will transfer care of the patient to me at this time.        8:10 AM On review of records, patient has 27 ED visits within the last 6 months for alcohol related issues.     8:20 AM Patient seen at bedside. She is laying comfortably in the stretcher. She denies recent cough, illness, or fever. Patient states she is currently homeless however knows where she will be staying for now. She refused to go to the shelter. She expressed that she would like to seek help for alcohol detox. I informed her of resources including [facility] that may admit her for detox. She is anxious to discharge.     8:53 AM Patient was able to ambulate with a steady gait and is using the bathroom. Prior to discharge, nursing staff found a bottle of vodka in her pocket which patient admits she has been sipping throughout her ED stay. Patient is A&O x 4 and has plans for self care.  She is fully dressed.  Her backpack is on.  She is ambulating down the halls of the ER asking for the exit.  She has a steady gait, is oriented, and is likely at her baseline as she is a chronic alcoholic.  She has no medical complaints at this time.  Vital signs are normal and stable.  She will be discharged from the ED with instructions on supportive care.     FINAL IMPRESSION   1. Acute alcoholic intoxication with complication (HCC) Acute    2. Alcohol abuse Acute      This dictation has been created using voice recognition software. The accuracy of the dictation is limited by the abilities of the software. I expect there may be some errors of grammar and possibly content. I made every attempt to manually correct the errors within my  dictation. However, errors related to voice recognition software may still exist and should be interpreted within the appropriate context.    Tiffany GAN (Cayetano), am scribing for, and in the presence of, Luana Orta M.D..  Electronically signed by: Tiffany Vang (Cayetano), 10/10/2019  Luana GAN M.D. personally performed the services described in this documentation, as scribed by Tiffany Vang in my presence, and it is both accurate and complete.    The note accurately reflects work and decisions made by me.  Luana Orta  10/10/2019  1:27 PM

## 2019-10-10 NOTE — ED PROVIDER NOTES
ED Provider Note    ED Provider Note      Primary care provider: Pcp Pt States None    CHIEF COMPLAINT  Chief Complaint   Patient presents with   • ETOH Withdrawal     Wants to detox       HPI  Ashleigh Marquis is a 58 y.o. female who presents to the Emergency Department with chief complaint of EtOH withdrawal.  Patient reportedly was a 0.29 in the field stumbling incoherent to me she reports pain all of her body and that she wants crackers.  Further history of present illness limited by severe altered mental status.      REVIEW OF SYSTEMS  As per HPI otherwise limited by altered mental status    PAST MEDICAL HISTORY   has a past medical history of Alcoholism (HCC), Anxiety, Arthritis, ASTHMA, Cancer (HCC) (1981), Chronic low back pain, Congestive heart failure (HCC), Depression, EtOH dependence ([facility]), Fall, GERD (gastroesophageal reflux disease), HTN, Hypertension, Indigestion, Muscle disorder, OSTEOPOROSIS, Other specified symptom associated with female genital organs, Psychiatric disorder, PTSD (post-traumatic stress disorder), Renal disorder, Seizure ([facility]), Ulcer, and Vitamin D deficiency.    SURGICAL HISTORY   has a past surgical history that includes hernia repair (1977); cysto stent placemnt pre surg (10/7/2010); cystoscopy stent placement (11/9/2010); ureteroscopy (11/9/2010); lasertripsy (11/9/2010); stent removal (11/9/2010); and gastroscopy-endo (N/A, 10/3/2018).    SOCIAL HISTORY  Social History     Tobacco Use   • Smoking status: Current Every Day Smoker     Packs/day: 0.50     Years: 20.00     Pack years: 10.00     Types: Cigarettes   • Smokeless tobacco: Never Used   • Tobacco comment: 1/2 pack per day   Substance Use Topics   • Alcohol use: Yes     Comment: vodka, heavy use   • Drug use: No      Social History     Substance and Sexual Activity   Drug Use No       FAMILY HISTORY  Non-Contributory    CURRENT MEDICATIONS  None    ALLERGIES  Allergies   Allergen Reactions   • Sulfa Drugs Vomiting   •  "Toradol Vomiting   • Gabapentin      Bowel incontinence     • Amoxicillin Rash     Reported by NAIDA on arrival to ED    Pt states she takes PCN 10/3       PHYSICAL EXAM  VITAL SIGNS: Temp 36.6 °C (97.9 °F) (Temporal)   Resp 18   Ht 1.626 m (5' 4\")   Wt 74.8 kg (165 lb)   LMP 11/02/2015   BMI 28.32 kg/m²   Pulse ox interpretation: I interpret this pulse ox as normal.  Constitutional: Somnolent requires constant noxious stimulation for any conversation  HEENT: Atraumatic normocephalic, pupils are equal round reactive to light extraocular movements are intact. The nares is clear, external ears are normal, mouth shows moist mucous membranes  Neck: Supple, no JVD no tracheal deviation  Cardiovascular: Regular rate and rhythm no murmur rub or gallop 2+ pulses peripherally x4  Thorax & Lungs: No respiratory distress, no wheezes rales or rhonchi, No chest tenderness.   GI: Soft nontender nondistended positive bowel sounds, no peritoneal signs  Skin: Warm dry no acute rash or lesion  Musculoskeletal: Moving all extremities with full range and 5 of 5 strength, no acute  deformity  Neurologic: Cranial nerves III through XII are grossly intact, no sensory deficit, no cerebellar dysfunction   Psychiatric: Appropriate affect for situation at this time      DIAGNOSTIC STUDIES / PROCEDURES      COURSE & MEDICAL DECISION MAKING  Pertinent Labs & Imaging studies reviewed. (See chart for details)    1:36 AM - Patient seen and examined at bedside.  Patient well-known to this ER for multiple admissions for severe alcohol intoxication.  Patient has no external signs of trauma her vital signs are unremarkable she had elevated POC breathalyzer in the field.  Patient requesting crackers which she is provided not compelled to order any labs or imaging at this time will opts for further sobriety and reevaluate once more clinically appropriate.  Should patient show any sign of decompensation or other concerning symptoms we will proceed " "appropriately.        Patient remains somnolent intoxicated difficult to arouse her vital signs been completely unremarkable is been stable throughout her time here.  Will be observed for further sobriety.  Patient will be discharged pending clinically appropriate sobriety able to tolerate p.o. intake and ambulate on her own.  I discussed with my partner Dr. Orta who will intervene should there be any decompensation or any complication.  Her help with this patient's greatly appreciated patient care transferred in stable condition condition.    Patient noted to have slightly elevated blood pressure likely circumstantial secondary to presenting complaint. Referred to primary care physician for further evaluation.        Temp 36.6 °C (97.9 °F) (Temporal)   Resp 18   Ht 1.626 m (5' 4\")   Wt 74.8 kg (165 lb)   LMP 11/02/2015   BMI 28.32 kg/m²     [facility], Emergency Dept  [address] [id number] [phone]    If symptoms worsen    [address] [address]  [phone]  Schedule an appointment as soon as possible for a visit   for establishment of primary care, for blood pressure management      New Prescriptions    No medications on file       FINAL IMPRESSION  1. Alcohol abuse Active   2. Acute alcoholic intoxication with complication (HCC) Active         This dictation has been created using voice recognition software and/or scribes. The accuracy of the dictation is limited by the abilities of the software and the expertise of the scribes. I expect there may be some errors of grammar and possibly content. I made every attempt to manually correct the errors within my dictation. However, errors related to voice recognition software and/or scribes may still exist and should be interpreted within the appropriate context.            "

## 2019-10-10 NOTE — ED NOTES
Received report from TETO Mitchell. Pt is resting in bed with even and unlabored breaths, in no apparent distress. Will continue to monitor pt while awaiting further orders.

## 2019-10-10 NOTE — ED NOTES
Pt ambulates well without assist back to bed, meal provided to pt, pt awake and cooperative at this time. Tolerating fluids and food well.

## 2019-10-11 ENCOUNTER — HOSPITAL ENCOUNTER (EMERGENCY)
Facility: MEDICAL CENTER | Age: 59
End: 2019-10-11
Payer: MEDICAID

## 2019-10-11 ENCOUNTER — HOSPITAL ENCOUNTER (EMERGENCY)
Facility: MEDICAL CENTER | Age: 59
End: 2019-10-12
Attending: EMERGENCY MEDICINE
Payer: MEDICAID

## 2019-10-11 VITALS
HEART RATE: 102 BPM | DIASTOLIC BLOOD PRESSURE: 94 MMHG | OXYGEN SATURATION: 98 % | HEIGHT: 64 IN | RESPIRATION RATE: 18 BRPM | SYSTOLIC BLOOD PRESSURE: 166 MMHG | TEMPERATURE: 97.9 F | BODY MASS INDEX: 28.17 KG/M2 | WEIGHT: 165 LBS

## 2019-10-11 DIAGNOSIS — F10.929 ALCOHOLIC INTOXICATION WITH COMPLICATION (HCC): ICD-10-CM

## 2019-10-11 LAB
ALBUMIN SERPL BCP-MCNC: 4.7 G/DL (ref 3.2–4.9)
ALBUMIN/GLOB SERPL: 1.2 G/DL
ALP SERPL-CCNC: 209 U/L (ref 30–99)
ALT SERPL-CCNC: 26 U/L (ref 2–50)
ANION GAP SERPL CALC-SCNC: 15 MMOL/L (ref 0–11.9)
AST SERPL-CCNC: 61 U/L (ref 12–45)
BILIRUB SERPL-MCNC: 0.5 MG/DL (ref 0.1–1.5)
BUN SERPL-MCNC: 7 MG/DL (ref 8–22)
CALCIUM SERPL-MCNC: 9.2 MG/DL (ref 8.5–10.5)
CHLORIDE SERPL-SCNC: 103 MMOL/L (ref 96–112)
CO2 SERPL-SCNC: 24 MMOL/L (ref 20–33)
CREAT SERPL-MCNC: 0.72 MG/DL (ref 0.5–1.4)
GLOBULIN SER CALC-MCNC: 4 G/DL (ref 1.9–3.5)
GLUCOSE SERPL-MCNC: 63 MG/DL (ref 65–99)
LIPASE SERPL-CCNC: 45 U/L (ref 11–82)
POTASSIUM SERPL-SCNC: 3.1 MMOL/L (ref 3.6–5.5)
PROT SERPL-MCNC: 8.7 G/DL (ref 6–8.2)
SODIUM SERPL-SCNC: 142 MMOL/L (ref 135–145)

## 2019-10-11 PROCEDURE — 83690 ASSAY OF LIPASE: CPT

## 2019-10-11 PROCEDURE — 80053 COMPREHEN METABOLIC PANEL: CPT

## 2019-10-11 PROCEDURE — 99285 EMERGENCY DEPT VISIT HI MDM: CPT

## 2019-10-11 PROCEDURE — 302449 STATCHG TRIAGE ONLY (STATISTIC)

## 2019-10-11 ASSESSMENT — PAIN SCALES - WONG BAKER: WONGBAKER_NUMERICALRESPONSE: HURTS A WHOLE LOT

## 2019-10-11 NOTE — ED TRIAGE NOTES
".  Chief Complaint   Patient presents with   • Sore Throat   • Alcohol Intoxication     .BP (!) 166/94   Pulse (!) 102   Temp 36.6 °C (97.9 °F) (Temporal)   Resp 18   Ht 1.626 m (5' 4\")   Wt 74.8 kg (165 lb)   LMP 11/02/2015   SpO2 98%   BMI 28.32 kg/m²     Patient BIB EMS with above complaints per EMS report, patient uncooperative, verbal, DELGADO spontaneously, refusing to sit up straight in wheel chair, states \"I don't feel well\", but does not answer any other questions, unable to complete screening questions, security aware of patient, to lobby in view of .    "

## 2019-10-12 VITALS
TEMPERATURE: 98.6 F | HEIGHT: 66 IN | RESPIRATION RATE: 18 BRPM | SYSTOLIC BLOOD PRESSURE: 132 MMHG | OXYGEN SATURATION: 95 % | WEIGHT: 165 LBS | HEART RATE: 68 BPM | DIASTOLIC BLOOD PRESSURE: 84 MMHG | BODY MASS INDEX: 26.52 KG/M2

## 2019-10-12 LAB
BASOPHILS # BLD AUTO: 1.8 % (ref 0–1.8)
BASOPHILS # BLD: 0.08 K/UL (ref 0–0.12)
EOSINOPHIL # BLD AUTO: 0.06 K/UL (ref 0–0.51)
EOSINOPHIL NFR BLD: 1.3 % (ref 0–6.9)
ERYTHROCYTE [DISTWIDTH] IN BLOOD BY AUTOMATED COUNT: 60.1 FL (ref 35.9–50)
HCT VFR BLD AUTO: 36.5 % (ref 37–47)
HGB BLD-MCNC: 11.6 G/DL (ref 12–16)
IMM GRANULOCYTES # BLD AUTO: 0.02 K/UL (ref 0–0.11)
IMM GRANULOCYTES NFR BLD AUTO: 0.4 % (ref 0–0.9)
LYMPHOCYTES # BLD AUTO: 2.07 K/UL (ref 1–4.8)
LYMPHOCYTES NFR BLD: 45.7 % (ref 22–41)
MCH RBC QN AUTO: 29.5 PG (ref 27–33)
MCHC RBC AUTO-ENTMCNC: 31.8 G/DL (ref 33.6–35)
MCV RBC AUTO: 92.9 FL (ref 81.4–97.8)
MONOCYTES # BLD AUTO: 0.44 K/UL (ref 0–0.85)
MONOCYTES NFR BLD AUTO: 9.7 % (ref 0–13.4)
NEUTROPHILS # BLD AUTO: 1.86 K/UL (ref 2–7.15)
NEUTROPHILS NFR BLD: 41.1 % (ref 44–72)
NRBC # BLD AUTO: 0 K/UL
NRBC BLD-RTO: 0 /100 WBC
PLATELET # BLD AUTO: 244 K/UL (ref 164–446)
PMV BLD AUTO: 9.5 FL (ref 9–12.9)
RBC # BLD AUTO: 3.93 M/UL (ref 4.2–5.4)
WBC # BLD AUTO: 4.5 K/UL (ref 4.8–10.8)

## 2019-10-12 PROCEDURE — 85025 COMPLETE CBC W/AUTO DIFF WBC: CPT

## 2019-10-12 NOTE — ED PROVIDER NOTES
ED Provider Note    CHIEF COMPLAINT  Chief Complaint   Patient presents with   • RUQ Pain   • Alcohol Intoxication       HPI  Ashleigh Marquis is a 58 y.o. female who presents w/ alcohol intoxication.  Patient is seen regularly in our emergency department for alcohol intoxication.  She has a long-standing history of alcohol abuse.  Patient reports some mild epigastric pain which is worse upon drinking.  She reports nausea without any episodes of vomiting, she denies any fevers.  She is intoxicated.  She reports drinking heavily.  She denies any hemoptysis or hematemesis or melena or hematochezia.  Patient denies any chest pain or shortness of breath.    REVIEW OF SYSTEMS  ROS  See HPI for further details. All other systems are negative.     PAST MEDICAL HISTORY   has a past medical history of Alcoholism ([facility]), Anxiety, Arthritis, ASTHMA, Cancer (HCC) (1981), Chronic low back pain, Congestive heart failure (HCC), Depression, EtOH dependence ([facility]), Fall, GERD (gastroesophageal reflux disease), HTN, Hypertension, Indigestion, Muscle disorder, OSTEOPOROSIS, Other specified symptom associated with female genital organs, Psychiatric disorder, PTSD (post-traumatic stress disorder), Renal disorder, Seizure ([facility]), Ulcer, and Vitamin D deficiency.    SOCIAL HISTORY  Social History     Tobacco Use   • Smoking status: Current Every Day Smoker     Packs/day: 0.50     Years: 20.00     Pack years: 10.00     Types: Cigarettes   • Smokeless tobacco: Never Used   • Tobacco comment: 1/2 pack per day   Substance and Sexual Activity   • Alcohol use: Yes     Comment: vodka, heavy use   • Drug use: No   • Sexual activity: Never     Partners: Male       SURGICAL HISTORY   has a past surgical history that includes hernia repair (1977); cysto stent placemnt pre surg (10/7/2010); cystoscopy stent placement (11/9/2010); ureteroscopy (11/9/2010); lasertripsy (11/9/2010); stent removal (11/9/2010); and gastroscopy-endo (N/A,  10/3/2018).    CURRENT MEDICATIONS  Home Medications    **Home medications have not yet been reviewed for this encounter**         ALLERGIES  Allergies   Allergen Reactions   • Sulfa Drugs Vomiting   • Toradol Vomiting   • Gabapentin      Bowel incontinence     • Amoxicillin Rash     Reported by NAIDA on arrival to ED    Pt states she takes PCN 10/3       PHYSICAL EXAM  Physical Exam   Constitutional: She is oriented to person, place, and time. She appears well-developed and well-nourished.   Smells of alcohol, slurred speech   HENT:   Head: Normocephalic and atraumatic.   Eyes: Conjunctivae are normal.   Neck: Normal range of motion. Neck supple.   Cardiovascular: Normal rate and regular rhythm.   Pulmonary/Chest: Effort normal and breath sounds normal.   Abdominal: Soft. Bowel sounds are normal. She exhibits no distension. There is no rebound.   Mild epigastric tenderness.  Flores's is negative   Neurological: She is alert and oriented to person, place, and time.   Skin: Skin is warm and dry. No rash noted.   Psychiatric: She has a normal mood and affect. Her behavior is normal.     Results for orders placed or performed during the hospital encounter of 10/11/19   CMP   Result Value Ref Range    Sodium 142 135 - 145 mmol/L    Potassium 3.1 (L) 3.6 - 5.5 mmol/L    Chloride 103 96 - 112 mmol/L    Co2 24 20 - 33 mmol/L    Anion Gap 15.0 (H) 0.0 - 11.9    Glucose 63 (L) 65 - 99 mg/dL    Bun 7 (L) 8 - 22 mg/dL    Creatinine 0.72 0.50 - 1.40 mg/dL    Calcium 9.2 8.5 - 10.5 mg/dL    AST(SGOT) 61 (H) 12 - 45 U/L    ALT(SGPT) 26 2 - 50 U/L    Alkaline Phosphatase 209 (H) 30 - 99 U/L    Total Bilirubin 0.5 0.1 - 1.5 mg/dL    Albumin 4.7 3.2 - 4.9 g/dL    Total Protein 8.7 (H) 6.0 - 8.2 g/dL    Globulin 4.0 (H) 1.9 - 3.5 g/dL    A-G Ratio 1.2 g/dL   LIPASE   Result Value Ref Range    Lipase 45 11 - 82 U/L   CBC WITH DIFFERENTIAL   Result Value Ref Range    WBC 4.5 (L) 4.8 - 10.8 K/uL    RBC 3.93 (L) 4.20 - 5.40 M/uL     Hemoglobin 11.6 (L) 12.0 - 16.0 g/dL    Hematocrit 36.5 (L) 37.0 - 47.0 %    MCV 92.9 81.4 - 97.8 fL    MCH 29.5 27.0 - 33.0 pg    MCHC 31.8 (L) 33.6 - 35.0 g/dL    RDW 60.1 (H) 35.9 - 50.0 fL    Platelet Count 244 164 - 446 K/uL    MPV 9.5 9.0 - 12.9 fL    Neutrophils-Polys 41.10 (L) 44.00 - 72.00 %    Lymphocytes 45.70 (H) 22.00 - 41.00 %    Monocytes 9.70 0.00 - 13.40 %    Eosinophils 1.30 0.00 - 6.90 %    Basophils 1.80 0.00 - 1.80 %    Immature Granulocytes 0.40 0.00 - 0.90 %    Nucleated RBC 0.00 /100 WBC    Neutrophils (Absolute) 1.86 (L) 2.00 - 7.15 K/uL    Lymphs (Absolute) 2.07 1.00 - 4.80 K/uL    Monos (Absolute) 0.44 0.00 - 0.85 K/uL    Eos (Absolute) 0.06 0.00 - 0.51 K/uL    Baso (Absolute) 0.08 0.00 - 0.12 K/uL    Immature Granulocytes (abs) 0.02 0.00 - 0.11 K/uL    NRBC (Absolute) 0.00 K/uL   ESTIMATED GFR   Result Value Ref Range    GFR If African American >60 >60 mL/min/1.73 m 2    GFR If Non African American >60 >60 mL/min/1.73 m 2         COURSE & MEDICAL DECISION MAKING  Pertinent Labs & Imaging studies reviewed. (See chart for details)  Patient here with symptoms consistent with alcohol intoxication with likely alcohol gastritis.  Check basic labs.  Right upper quadrant without any tenderness palpation, Flores's negative  Patient's labs are reassuring.  Patient's abdominal tenderness has resolved  Patient was discharged when able to ambulate on her own and clinically sober.    FINAL IMPRESSION    1. Alcoholic intoxication with complication (HCC)               Electronically signed by: Matt Lawler, 10/11/2019 10:22 PM

## 2019-10-12 NOTE — ED TRIAGE NOTES
Ashleigh Marquis  58 y.o.  female  Chief Complaint   Patient presents with   • RUQ Pain   • Alcohol Intoxication     Brought in by rem, c/o ruq pain today. Denies N/V. + etoh. Patient was here earlier for different reason and Left without being seen.

## 2019-10-12 NOTE — ED NOTES
Pt arrives with complaint of intoxication and abdominal pain. Pt checked into ED earlier today and left without being seen. Pt arrives tonight with same complaint. Pt with slurred speech, reports ETOH use.

## 2019-10-24 ENCOUNTER — HOSPITAL ENCOUNTER (EMERGENCY)
Facility: MEDICAL CENTER | Age: 59
End: 2019-10-24
Attending: EMERGENCY MEDICINE
Payer: MEDICAID

## 2019-10-24 VITALS
SYSTOLIC BLOOD PRESSURE: 144 MMHG | BODY MASS INDEX: 27.49 KG/M2 | WEIGHT: 165 LBS | OXYGEN SATURATION: 100 % | TEMPERATURE: 98.2 F | DIASTOLIC BLOOD PRESSURE: 86 MMHG | HEIGHT: 65 IN | RESPIRATION RATE: 19 BRPM | HEART RATE: 90 BPM

## 2019-10-24 DIAGNOSIS — F10.10 ALCOHOL ABUSE: ICD-10-CM

## 2019-10-24 LAB
ALBUMIN SERPL BCP-MCNC: 3.9 G/DL (ref 3.2–4.9)
ALBUMIN/GLOB SERPL: 1.2 G/DL
ALP SERPL-CCNC: 119 U/L (ref 30–99)
ALT SERPL-CCNC: 33 U/L (ref 2–50)
ANION GAP SERPL CALC-SCNC: 11 MMOL/L (ref 0–11.9)
ANISOCYTOSIS BLD QL SMEAR: ABNORMAL
AST SERPL-CCNC: 73 U/L (ref 12–45)
BASOPHILS # BLD AUTO: 1.1 % (ref 0–1.8)
BASOPHILS # BLD: 0.06 K/UL (ref 0–0.12)
BILIRUB SERPL-MCNC: 0.2 MG/DL (ref 0.1–1.5)
BUN SERPL-MCNC: 12 MG/DL (ref 8–22)
CALCIUM SERPL-MCNC: 9.2 MG/DL (ref 8.5–10.5)
CHLORIDE SERPL-SCNC: 108 MMOL/L (ref 96–112)
CO2 SERPL-SCNC: 25 MMOL/L (ref 20–33)
COMMENT 1642: NORMAL
CREAT SERPL-MCNC: 0.51 MG/DL (ref 0.5–1.4)
EOSINOPHIL # BLD AUTO: 0.04 K/UL (ref 0–0.51)
EOSINOPHIL NFR BLD: 0.7 % (ref 0–6.9)
ERYTHROCYTE [DISTWIDTH] IN BLOOD BY AUTOMATED COUNT: 67 FL (ref 35.9–50)
GLOBULIN SER CALC-MCNC: 3.2 G/DL (ref 1.9–3.5)
GLUCOSE SERPL-MCNC: 79 MG/DL (ref 65–99)
HCT VFR BLD AUTO: 35.2 % (ref 37–47)
HGB BLD-MCNC: 11.2 G/DL (ref 12–16)
IMM GRANULOCYTES # BLD AUTO: 0.02 K/UL (ref 0–0.11)
IMM GRANULOCYTES NFR BLD AUTO: 0.4 % (ref 0–0.9)
LYMPHOCYTES # BLD AUTO: 1.21 K/UL (ref 1–4.8)
LYMPHOCYTES NFR BLD: 22.2 % (ref 22–41)
MACROCYTES BLD QL SMEAR: ABNORMAL
MCH RBC QN AUTO: 31.2 PG (ref 27–33)
MCHC RBC AUTO-ENTMCNC: 31.8 G/DL (ref 33.6–35)
MCV RBC AUTO: 98.1 FL (ref 81.4–97.8)
MONOCYTES # BLD AUTO: 0.79 K/UL (ref 0–0.85)
MONOCYTES NFR BLD AUTO: 14.5 % (ref 0–13.4)
MORPHOLOGY BLD-IMP: NORMAL
NEUTROPHILS # BLD AUTO: 3.34 K/UL (ref 2–7.15)
NEUTROPHILS NFR BLD: 61.1 % (ref 44–72)
NRBC # BLD AUTO: 0 K/UL
NRBC BLD-RTO: 0 /100 WBC
PLATELET # BLD AUTO: 190 K/UL (ref 164–446)
PLATELET BLD QL SMEAR: NORMAL
PMV BLD AUTO: 10.2 FL (ref 9–12.9)
POTASSIUM SERPL-SCNC: 4.3 MMOL/L (ref 3.6–5.5)
PROT SERPL-MCNC: 7.1 G/DL (ref 6–8.2)
RBC # BLD AUTO: 3.59 M/UL (ref 4.2–5.4)
RBC BLD AUTO: PRESENT
SODIUM SERPL-SCNC: 144 MMOL/L (ref 135–145)
WBC # BLD AUTO: 5.5 K/UL (ref 4.8–10.8)

## 2019-10-24 PROCEDURE — 700111 HCHG RX REV CODE 636 W/ 250 OVERRIDE (IP): Performed by: EMERGENCY MEDICINE

## 2019-10-24 PROCEDURE — 80053 COMPREHEN METABOLIC PANEL: CPT

## 2019-10-24 PROCEDURE — 99285 EMERGENCY DEPT VISIT HI MDM: CPT

## 2019-10-24 PROCEDURE — 96374 THER/PROPH/DIAG INJ IV PUSH: CPT

## 2019-10-24 PROCEDURE — 85025 COMPLETE CBC W/AUTO DIFF WBC: CPT

## 2019-10-24 RX ORDER — LORAZEPAM 2 MG/ML
1 INJECTION INTRAMUSCULAR ONCE
Status: COMPLETED | OUTPATIENT
Start: 2019-10-24 | End: 2019-10-24

## 2019-10-24 RX ADMIN — LORAZEPAM 1 MG: 2 INJECTION INTRAMUSCULAR; INTRAVENOUS at 05:28

## 2019-10-24 ASSESSMENT — ENCOUNTER SYMPTOMS
HEADACHES: 0
SHORTNESS OF BREATH: 0
FEVER: 0
NAUSEA: 1

## 2019-10-24 ASSESSMENT — LIFESTYLE VARIABLES: SUBSTANCE_ABUSE: 1

## 2019-10-24 NOTE — ED PROVIDER NOTES
"ED Provider Note    ED Provider Note    Primary care provider: Pcp Pt States None  Means of arrival: EMS  History obtained from: patient  History limited by: NOne    CHIEF COMPLAINT  Chief Complaint   Patient presents with   • ETOH Intoxication     Pt was at [facility] in retirement when she told staff to call 911 because she was detoxing. Pt was actively drinking alcohol at time of call.   • Leg Swelling     Bilateral x2-3 days       HPI  Ashleigh Marquis is a 58 y.o. female who presents to the Emergency Department via EMS with a chief complaint of a call intoxication and \"detoxing\".  She is concerned about swelling in her lower extremities.  She states she signed herself out AGAINST MEDICAL ADVICE from [facility] because she thought that they \"did not know what they were doing and work giving her blood pressure medication\".  Patient thought she come here because she \"thought she needed medication, and felt unwell\".  When asked to elaborate about this, she reports feeling nauseated.  She does state that she left [facility] and drink.  W she is requesting Ativan.  Plan at this time is to enter sober housing.    REVIEW OF SYSTEMS  Review of Systems   Constitutional: Negative for fever.   HENT: Negative for congestion.    Respiratory: Negative for shortness of breath.    Cardiovascular: Positive for leg swelling.   Gastrointestinal: Positive for nausea.   Neurological: Negative for headaches.   Psychiatric/Behavioral: Positive for substance abuse.   All other systems reviewed and are negative.      PAST MEDICAL HISTORY   has a past medical history of Alcoholism ([facility]), Anxiety, Arthritis, ASTHMA, Cancer ([facility]) (1981), Chronic low back pain, Congestive heart failure ([facility]), Depression, EtOH dependence ([facility]), Fall, GERD (gastroesophageal reflux disease), HTN, Hypertension, Indigestion, Muscle disorder, OSTEOPOROSIS, Other specified symptom associated with female genital organs, Psychiatric disorder, PTSD (post-traumatic " "stress disorder), Renal disorder, Seizure (HCC), Ulcer, and Vitamin D deficiency.    SURGICAL HISTORY   has a past surgical history that includes hernia repair (1977); cysto stent placemnt pre surg (10/7/2010); cystoscopy stent placement (11/9/2010); ureteroscopy (11/9/2010); lasertripsy (11/9/2010); stent removal (11/9/2010); and gastroscopy-endo (N/A, 10/3/2018).    SOCIAL HISTORY  Social History     Tobacco Use   • Smoking status: Current Every Day Smoker     Packs/day: 0.50     Years: 20.00     Pack years: 10.00     Types: Cigarettes   • Smokeless tobacco: Never Used   • Tobacco comment: 1/2 pack per day   Substance Use Topics   • Alcohol use: Yes     Comment: vodka, heavy use   • Drug use: No      Social History     Substance and Sexual Activity   Drug Use No       FAMILY HISTORY  Family History   Problem Relation Age of Onset   • Heart Disease Father    • Hypertension Father    • Diabetes Father    • Cancer Paternal Grandmother    • Depression Other    • Lung Disease Neg Hx    • Stroke Neg Hx        CURRENT MEDICATIONS  Home Medications     Reviewed by Angela Medrano R.N. (Registered Nurse) on 10/24/19 at 0404  Med List Status: <None>   Medication Last Dose Status   acetaminophen (TYLENOL) 325 MG Tab  Active   doxepin (SINEQUAN) 10 MG Cap  Active   lisinopril (PRINIVIL) 10 MG Tab  Active   loperamide (IMODIUM) 2 MG Cap  Active   Magnesium 400 MG Tab  Active   metoprolol (LOPRESSOR) 25 MG Tab  Active   potassium chloride SA (KDUR) 20 MEQ Tab CR  Active   QUEtiapine (SEROQUEL) 25 MG Tab  Active                ALLERGIES  Allergies   Allergen Reactions   • Sulfa Drugs Vomiting   • Toradol Vomiting   • Gabapentin      Bowel incontinence     • Amoxicillin Rash     Reported by NAIDA on arrival to ED    Pt states she takes PCN 10/3       PHYSICAL EXAM  VITAL SIGNS: /84   Pulse 92   Temp 36.8 °C (98.2 °F) (Temporal)   Resp 16   Ht 1.651 m (5' 5\")   Wt 74.8 kg (165 lb)   LMP 11/02/2015   SpO2 100% "   BMI 27.46 kg/m²   Vitals reviewed.  Constitutional: Patient is oriented to person, place, and time. Appears well-developed and well-nourished. No distress.    Head: Normocephalic and atraumatic.   Ears: Normal external ears bilaterally.   Mouth/Throat: Oropharynx is clear and moist, no exudates.   Eyes: Conjunctivae are normal. Pupils are equal, round, and reactive to light.   Neck: Normal range of motion. Neck supple.  Cardiovascular: Normal rate, regular rhythm and normal heart sounds. Normal peripheral pulses. No peripheral edema  Pulmonary/Chest: Effort normal and breath sounds normal. No respiratory distress, no wheezes, rhonchi, or rales. No chest wall tenderness.  Abdominal: Soft. Bowel sounds are normal. There is no tenderness. No rebound or guarding, or peritoneal signs. No CVA tenderness.  Musculoskeletal: No edema and no tenderness.   Lymphadenopathy: No cervical adenopathy.   Neurological: No focal deficits.   Skin: Skin is warm and dry. No erythema. No pallor.   Psychiatric: Patient has a normal mood and affect.     LABS  Results for orders placed or performed during the hospital encounter of 10/24/19   CMP   Result Value Ref Range    Sodium 144 135 - 145 mmol/L    Potassium 4.3 3.6 - 5.5 mmol/L    Chloride 108 96 - 112 mmol/L    Co2 25 20 - 33 mmol/L    Anion Gap 11.0 0.0 - 11.9    Glucose 79 65 - 99 mg/dL    Bun 12 8 - 22 mg/dL    Creatinine 0.51 0.50 - 1.40 mg/dL    Calcium 9.2 8.5 - 10.5 mg/dL    AST(SGOT) 73 (H) 12 - 45 U/L    ALT(SGPT) 33 2 - 50 U/L    Alkaline Phosphatase 119 (H) 30 - 99 U/L    Total Bilirubin 0.2 0.1 - 1.5 mg/dL    Albumin 3.9 3.2 - 4.9 g/dL    Total Protein 7.1 6.0 - 8.2 g/dL    Globulin 3.2 1.9 - 3.5 g/dL    A-G Ratio 1.2 g/dL   CBC WITH DIFFERENTIAL   Result Value Ref Range    WBC 5.5 4.8 - 10.8 K/uL    RBC 3.59 (L) 4.20 - 5.40 M/uL    Hemoglobin 11.2 (L) 12.0 - 16.0 g/dL    Hematocrit 35.2 (L) 37.0 - 47.0 %    MCV 98.1 (H) 81.4 - 97.8 fL    MCH 31.2 27.0 - 33.0 pg    MCHC  31.8 (L) 33.6 - 35.0 g/dL    RDW 67.0 (H) 35.9 - 50.0 fL    Platelet Count 190 164 - 446 K/uL    MPV 10.2 9.0 - 12.9 fL    Neutrophils-Polys 61.10 44.00 - 72.00 %    Lymphocytes 22.20 22.00 - 41.00 %    Monocytes 14.50 (H) 0.00 - 13.40 %    Eosinophils 0.70 0.00 - 6.90 %    Basophils 1.10 0.00 - 1.80 %    Immature Granulocytes 0.40 0.00 - 0.90 %    Nucleated RBC 0.00 /100 WBC    Neutrophils (Absolute) 3.34 2.00 - 7.15 K/uL    Lymphs (Absolute) 1.21 1.00 - 4.80 K/uL    Monos (Absolute) 0.79 0.00 - 0.85 K/uL    Eos (Absolute) 0.04 0.00 - 0.51 K/uL    Baso (Absolute) 0.06 0.00 - 0.12 K/uL    Immature Granulocytes (abs) 0.02 0.00 - 0.11 K/uL    NRBC (Absolute) 0.00 K/uL    Anisocytosis 1+     Macrocytosis 1+    ESTIMATED GFR   Result Value Ref Range    GFR If African American >60 >60 mL/min/1.73 m 2    GFR If Non African American >60 >60 mL/min/1.73 m 2   PERIPHERAL SMEAR REVIEW   Result Value Ref Range    Peripheral Smear Review see below    PLATELET ESTIMATE   Result Value Ref Range    Plt Estimation Normal    MORPHOLOGY   Result Value Ref Range    RBC Morphology Present    DIFFERENTIAL COMMENT   Result Value Ref Range    Comments-Diff see below        All labs reviewed by me.    COURSE & MEDICAL DECISION MAKING  Pertinent Labs & Imaging studies reviewed. (See chart for details)    Obtained and reviewed past medical records.  This is the patient's ninth visit in October alone.  All for similar complaints, related to alcohol use.    4:16 AM - Patient seen and examined at bedside.  Recent has multiple vague complaints.  She gives a circuitous story about events leading up to returning to the emergency department.  She is well-known to this department for similar presentations.  She complains of alcohol intoxication.  She does not have evidence of lower extremity edema on exam.  She does not have an abnormal lung exam.  Related to alcohol abuse.  IV started, labs drawn per nursing protocols.    6:58 AM patient's  evaluated at the bedside.  She is resting and appears comfortable.  Her vital signs are normal.  She is not tachycardic.  She awakens for reexam.  We discussed lab results.  At this time, I feel she can safely be discharged home.     Labs show an anemia which is her baseline.  There is no neutrophilic shift.  No elevated white blood cell count.  Chemistry shows slightly elevated AST of 73 which I attribute to her alcohol abuse.  Normal total bilirubin.  Normal electrolytes.  No anion gap or change in CO2.    She is discharged home in stable condition.    FINAL IMPRESSION  1. Alcohol abuse

## 2019-10-24 NOTE — ED TRIAGE NOTES
"Chief Complaint   Patient presents with   • ETOH Intoxication     Pt was at [facility] in [facility] when she told staff to call 911 because she was detoxing. Pt was actively drinking alcohol at time of call.   • Leg Swelling     Bilateral x2-3 days     Pt brought in by EMS for above complaint. Per shelter staff pt left [facility] AMA yesterday and she was actively drinking in the shelter. Pt reports not taking any home meds, to include BP meds, for \"a while.\" Bilateral ankle swelling noted but pt is also wearing very tight leggings at time of arrival to ER.  "

## 2019-10-24 NOTE — ED NOTES
Pt ambulated without assistance to [facility] from ambulance. Pt placed in hospital gown and is now resting in bed with even and unlabored breaths, in no apparent distress.   Subjective


Remarks


Pt is doing well. No major complaints except hunger because he has been NPO 

after midnight for 2D ECHO this am. He would like to know when the ECHO would 

be performed. No fever or chills. No chest pain.


 (Clover Kelly MD R2)





Objective


Vitals





Vital Signs








  Date Time  Temp Pulse Resp B/P (MAP) Pulse Ox O2 Delivery O2 Flow Rate FiO2


 


9/5/17 08:13 98.2 58 18 110/76 (87) 97   


 


9/5/17 03:39 97.6 69 18 129/76 (93) 97   


 


9/4/17 23:40 97.8 64 19 122/81 (95) 98   


 


9/4/17 21:27        21


 


9/4/17 20:47 97.6 54 17 113/69 (84) 98   


 


9/4/17 16:41 97.9 60 20 107/75 (86) 97   


 


9/4/17 12:10 97.9 63 18 109/73 (85) 98   














I/O      


 


 9/4/17 9/4/17 9/4/17 9/5/17 9/5/17 9/5/17





 07:00 15:00 23:00 07:00 15:00 23:00


 


Intake Total  480 ml 250 ml 1250 ml  


 


Balance  480 ml 250 ml 1250 ml  


 


      


 


Intake Oral  480 ml    


 


IV Total   250 ml 1250 ml  


 


# Voids 2 3    








 (Clover Kelly MD R2)


Result Diagram:  


9/4/17 0613 9/4/17 0613





Objective Remarks


GENERAL: This is a well-developed patient, in no apparent distress.


SKIN: No rashes, ecchymoses or lesions. Cool and dry.


HEAD: Atraumatic. Normocephalic. 


EYES: Pupils equal round and reactive. Extraocular motions intact. No scleral 

icterus. No injection or drainage. slight asymmetry of eyes and face after 

multiple fracture


ENT: Nose without bleeding, purulent drainage or septal hematoma. Airway patent.


NECK: Trachea midline. No JVD or lymphadenopathy. Small 0.5 mm nodule in the 

right lateral neck consistent with scar tissue. Supple, nontender, no meningeal 

signs.


CARDIOVASCULAR: Regular rate, regular rhythm, holosystolic murmur best heard in 

the mitral area


RESPIRATORY: Clear to auscultation. Breath sounds equal bilaterally. No wheezes

, rales, or rhonchi.  


GASTROINTESTINAL: Abdomen soft, non-tender, nondistended. No hepato-splenomegaly

, or palpable masses. No guarding.


MUSCULOSKELETAL: Extremities without clubbing, cyanosis, or edema. No joint 

tenderness, effusion, or edema noted. 


NEUROLOGICAL: Alert. Cranial nerves II through XII intact.  Motor and sensory 

grossly within normal limits. Five out of 5 muscle strength in all muscle 

groups.  Normal speech.


 (Clover Kelly MD R2)





A/P


Assessment and Plan


36-year-old  male with a past medical history significant for IV drug 

use, polysubstance abuse, hepatitis B, and endocarditis admitted after 

anaerobic blood cultures were positive for coagulase-negative Staphylococcus 

aureus. He was admitted for management with IV antibiotics. 2D Echo was 

performed on 9/5/17 and revealed a small, mobile vegetation. Blood culture 

drawn on admission is positive for coagulase negative staphylococcus in 1 

aerobic vial. SCOTTY to be performed on 9/6/17.


 (Clover Klely MD R2)


Attending Attestation


Patient seen and examined.  Case reviewed and discussed with the resident team.

  Agree with plan of care as discussed with me and documented in the resident 

note.


unfortunately, he does have endocarditis. he was informed of this and told he 

would need extended treatment


 (Sonia Turcios MD)


Problem List:  


(1) Bacteremia


ICD Codes:  R78.81 - Bacteremia


Status:  Acute


Plan:  -Possible source is mitral valve endocarditis. Blood cultures from 9/3/

17 growing coagulase-negative staph in 1 aerobic vial


-CBC wnl


-Continue Vancomycin 1 g IV every 8 with pharmacy consult (started 9/3/17)





(2) Endocarditis of mitral valve


ICD Codes:  I05.8 - Other rheumatic mitral valve diseases


Plan:  -2D Echo on 9/5/17 showing mobile structure on the anterior leaflet of 

the mitral valve, consistent with endocarditis


-SCOTTY in the am





(3) Hepatitis B


ICD Codes:  B19.10 - Unspecified viral hepatitis B without hepatic coma


Status:  Acute


Plan:  -Patient was diagnosed with hepatitis B infection at last admission in 

September 2016 but has not followed up with GI as an outpatient


-GI consulted - will repeat RUQ US, hesitant to initiate treatment due to no 

follow up as outpatient 


-Quantitative hepatitis B viral load pending 





(4) Pancytopenia


ICD Codes:  D61.818 - Other pancytopenia


Plan:  -H&H 11.3/33.8 respectively


-WBC low at 2.1 down from 3.5 the previous day


-Platelets 78 down from 97


-Consider hematology consult


-Possibly from poor nutrition or bacteremia


-We'll continue to monitor with daily CBCs


unclear if this could be also from Hep B. hep C can cause some pancytopenia. 

can continue to investigate if it does not improve





(5) Methadone maintenance therapy patient


ICD Codes:  F11.20 - Opioid dependence, uncomplicated


Status:  Acute


Plan:  -History of polysubstance and IV drug use


-UDS positive for cocaine and amphetamines, negative for opiates


-Continue methadone 60 mg by mouth daily





(6) Tobacco abuse


ICD Codes:  Z72.0 - Tobacco use


Plan:  -Nicotine patch 7.5 mg





(7) FEN/DVT PPX/GI PPX/Nursing Orders 


Plan:  Fluids: NS @ 125 mls/hr IV


Electrolytes: Will monitor and replace as needed, a.m. labs


Nutrition: Regular adult diet


DVT Prophylaxis: Lovenox 40mg daily


GI Prophylaxis: None required currently


Constipation prophylaxis: Nadya-colase 1 tab by mouth twice a day


Tylenol 650 mg by mouth every 4 hours when necessary pain 1-10 a temperature 

greater than 100.4F


Zofran 4 mg IV push every 6 hours when necessary nausea vomiting


Restoril 15 mg by mouth at bedtime when necessary insomnia





-Vitals Q4h


-Monitor I's and O's


-Fall precautions


-Activity OOB with assistance


-PT to assist with ambulation


-Case management consult to assist with discharge disposition, he will likely 

go back home once he is able. he has a steady job





Disposition: Pending infectious diseases recommendation


 (Clover Kelly MD R2)





Problem Qualifiers





(1) Hepatitis B:  


Qualified Codes:  B18.1 - Chronic viral hepatitis B without delta-agent








Clover Kelly MD R2 Sep 5, 2017 09:28


Sonia Turcios MD Sep 6, 2017 16:04

## 2019-10-24 NOTE — ED NOTES
Pt cleared for discharge, AVS given, pt refusing to leave the ED.  Charge nurse notified, security called to remove pt.  Pt cooperative with security and walked to lobby.

## 2019-10-24 NOTE — ED NOTES
Patient resting comfortably. Chest rise and fall noted. Call light within reach.  Will Continue to monitor.

## 2019-11-14 ENCOUNTER — HOSPITAL ENCOUNTER (EMERGENCY)
Dept: HOSPITAL 8 - ED | Age: 59
Discharge: LEFT BEFORE DISCHARGE | End: 2019-11-14
Payer: MEDICAID

## 2019-11-14 DIAGNOSIS — Z53.21: Primary | ICD-10-CM

## 2019-11-29 ENCOUNTER — HOSPITAL ENCOUNTER (EMERGENCY)
Facility: MEDICAL CENTER | Age: 59
End: 2019-11-29
Attending: EMERGENCY MEDICINE
Payer: MEDICAID

## 2019-11-29 VITALS
RESPIRATION RATE: 16 BRPM | SYSTOLIC BLOOD PRESSURE: 90 MMHG | HEIGHT: 65 IN | WEIGHT: 165 LBS | OXYGEN SATURATION: 93 % | TEMPERATURE: 97.3 F | DIASTOLIC BLOOD PRESSURE: 49 MMHG | HEART RATE: 88 BPM | BODY MASS INDEX: 27.49 KG/M2
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
## 2019-11-29 DIAGNOSIS — F10.20 ALCOHOLISM (HCC): ICD-10-CM

## 2019-11-29 DIAGNOSIS — F10.920 ALCOHOLIC INTOXICATION WITHOUT COMPLICATION (HCC): ICD-10-CM

## 2019-11-29 PROCEDURE — 99285 EMERGENCY DEPT VISIT HI MDM: CPT

## 2019-11-29 PROCEDURE — 700105 HCHG RX REV CODE 258

## 2019-11-29 RX ORDER — SODIUM CHLORIDE 9 MG/ML
1000 INJECTION, SOLUTION INTRAVENOUS ONCE
Status: COMPLETED | OUTPATIENT
Start: 2019-11-29 | End: 2019-11-29

## 2019-11-29 RX ORDER — HALOPERIDOL 5 MG/ML
5 INJECTION INTRAMUSCULAR ONCE
Status: DISCONTINUED | OUTPATIENT
Start: 2019-11-29 | End: 2019-11-30 | Stop reason: HOSPADM

## 2019-11-29 RX ORDER — DIPHENHYDRAMINE HYDROCHLORIDE 50 MG/ML
50 INJECTION INTRAMUSCULAR; INTRAVENOUS ONCE
Status: DISCONTINUED | OUTPATIENT
Start: 2019-11-29 | End: 2019-11-30 | Stop reason: HOSPADM

## 2019-11-29 RX ADMIN — SODIUM CHLORIDE 1000 ML: 9 INJECTION, SOLUTION INTRAVENOUS at 20:51

## 2019-11-29 RX ADMIN — SODIUM CHLORIDE 1000 ML: 9 INJECTION, SOLUTION INTRAVENOUS at 22:06

## 2019-11-29 ASSESSMENT — LIFESTYLE VARIABLES
EVER FELT BAD OR GUILTY ABOUT YOUR DRINKING: NO
TOTAL SCORE: 0
DOES PATIENT WANT TO STOP DRINKING: NO
TOTAL SCORE: 0
DO YOU DRINK ALCOHOL: YES
HAVE PEOPLE ANNOYED YOU BY CRITICIZING YOUR DRINKING: NO
CONSUMPTION TOTAL: INCOMPLETE
HAVE YOU EVER FELT YOU SHOULD CUT DOWN ON YOUR DRINKING: NO
EVER HAD A DRINK FIRST THING IN THE MORNING TO STEADY YOUR NERVES TO GET RID OF A HANGOVER: NO
TOTAL SCORE: 0
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
## 2019-11-30 NOTE — ED NOTES
Patient resting in bed, sleeping, no signs of pain or distress, unlabored breathing noted, repositons self occasionally, sitter in line of sight performing direct observation, bed in low position, no cough noted, on room air, frequent rounding performed, safety room features in place, IV fluids infusing.

## 2019-11-30 NOTE — ED NOTES
Patient resting in bed, sleeping, no signs of pain or distress, unlabored breathing noted, attached to cardiac monitor, bed in low position, call light within reach, sitter at bedside.

## 2019-11-30 NOTE — DISCHARGE INSTRUCTIONS
Alcoholics Anonymous       [address]      [phone]    Alcoholics Anonymous     [address]     [phone]

## 2019-11-30 NOTE — ED NOTES
Patient verbalized understanding of discharge instructions, provided with discharge paperwork, gait steady, ambulated independently to CORNELIUS branham.

## 2019-11-30 NOTE — ED NOTES
Patient resting in bed, sleeping, no signs of pain or distress, unlabored breathing noted on room air, sitter in hallway performing direct observation, repositions self occasionally, bed in low position, safety room features in place, no cough noted, frequent rounding performed, will continue to assess patient, IV fluids infusing.

## 2019-11-30 NOTE — ED NOTES
Pt. Exited room yelling that she wants to see the doctor, escorted patient back her room and sat her in bed, pt. Removed all monitoring equipment. Explained to patient that she can not be yelling at staff.

## 2019-11-30 NOTE — ED NOTES
Discussed care with ERP, provided patient with lunch box,pt. Sitting in room, pt. Refuses tele monitoring.

## 2020-01-03 NOTE — DISCHARGE INSTRUCTIONS
Alcohol Problems  Most adults who drink alcohol drink in moderation (not a lot) are at low risk for developing problems related to their drinking. However, all drinkers, including low-risk drinkers, should know about the health risks connected with drinking alcohol.  RECOMMENDATIONS FOR LOW-RISK DRINKING   Drink in moderation. Moderate drinking is defined as follows:   · Men - no more than 2 drinks per day.  · Nonpregnant women - no more than 1 drink per day.  · Over age 65 - no more than 1 drink per day.  A standard drink is 12 grams of pure alcohol, which is equal to a 12 ounce bottle of beer or wine cooler, a 5 ounce glass of wine, or 1.5 ounces of distilled spirits (such as whiskey, gama, vodka, or rum).   ABSTAIN FROM (DO NOT DRINK) ALCOHOL:  · When pregnant or considering pregnancy.  · When taking a medication that interacts with alcohol.  · If you are alcohol dependent.  · A medical condition that prohibits drinking alcohol (such as ulcer, liver disease, or heart disease).  DISCUSS WITH YOUR CAREGIVER:  · If you are at risk for coronary heart disease, discuss the potential benefits and risks of alcohol use: Light to moderate drinking is associated with lower rates of coronary heart disease in certain populations (for example, men over age 45 and postmenopausal women). Infrequent or nondrinkers are advised not to begin light to moderate drinking to reduce the risk of coronary heart disease so as to avoid creating an alcohol-related problem. Similar protective effects can likely be gained through proper diet and exercise.  · Women and the elderly have smaller amounts of body water than men. As a result women and the elderly achieve a higher blood alcohol concentration after drinking the same amount of alcohol.  · Exposing a fetus to alcohol can cause a broad range of birth defects referred to as Fetal Alcohol Syndrome (FAS) or Alcohol-Related Birth Defects (ARBD). Although FAS/ARBD is connected with excessive  alcohol consumption during pregnancy, studies also have reported neurobehavioral problems in infants born to mothers reporting drinking an average of 1 drink per day during pregnancy.  · Heavier drinking (the consumption of more than 4 drinks per occasion by men and more than 3 drinks per occasion by women) impairs learning (cognitive) and psychomotor functions and increases the risk of alcohol-related problems, including accidents and injuries.  CAGE QUESTIONS:   · Have you ever felt that you should Cut down on your drinking?  · Have people Annoyed you by criticizing your drinking?  · Have you ever felt bad or Guilty about your drinking?  · Have you ever had a drink first thing in the morning to steady your nerves or get rid of a hangover (Eye opener)?  If you answered positively to any of these questions: You may be at risk for alcohol-related problems if alcohol consumption is:   · Men: Greater than 14 drinks per week or more than 4 drinks per occasion.  · Women: Greater than 7 drinks per week or more than 3 drinks per occasion.  Do you or your family have a medical history of alcohol-related problems, such as:  · Blackouts.  · Sexual dysfunction.  · Depression.  · Trauma.  · Liver dysfunction.  · Sleep disorders.  · Hypertension.  · Chronic abdominal pain.  · Has your drinking ever caused you problems, such as problems with your family, problems with your work (or school) performance, or accidents/injuries?  · Do you have a compulsion to drink or a preoccupation with drinking?  · Do you have poor control or are you unable to stop drinking once you have started?  · Do you have to drink to avoid withdrawal symptoms?  · Do you have problems with withdrawal such as tremors, nausea, sweats, or mood disturbances?  · Does it take more alcohol than in the past to get you high?  · Do you feel a strong urge to drink?  · Do you change your plans so that you can have a drink?  · Do you ever drink in the morning to relieve  the shakes or a hangover?  If you have answered a number of the previous questions positively, it may be time for you to talk to your caregivers, family, and friends and see if they think you have a problem. Alcoholism is a chemical dependency that keeps getting worse and will eventually destroy your health and relationships. Many alcoholics end up dead, impoverished, or in group home. This is often the end result of all chemical dependency.  · Do not be discouraged if you are not ready to take action immediately.  · Decisions to change behavior often involve up and down desires to change and feeling like you cannot decide.  · Try to think more seriously about your drinking behavior.  · Think of the reasons to quit.  WHERE TO GO FOR ADDITIONAL INFORMATION   · The National [place] on Alcohol Abuse and Alcoholism (NIAAA)  www.niaaa.nih.gov  · National [place] on Alcoholism and Drug Dependence (NCADD)  www.ncadd.org  · American Society of Addiction Medicine (ASAM)  www.asam.org   Document Released: 12/18/2006 Document Revised: 03/11/2013 Document Reviewed: 08/05/2009  ExitCare® Patient Information ©2014 [employer].     Statement Selected

## 2020-03-26 ENCOUNTER — OFFICE VISIT (OUTPATIENT)
Dept: URGENT CARE | Facility: CLINIC | Age: 60
End: 2020-03-26
Payer: MEDICAID

## 2020-03-26 VITALS
WEIGHT: 160 LBS | HEART RATE: 111 BPM | BODY MASS INDEX: 27.31 KG/M2 | DIASTOLIC BLOOD PRESSURE: 98 MMHG | TEMPERATURE: 98.7 F | HEIGHT: 64 IN | OXYGEN SATURATION: 94 % | SYSTOLIC BLOOD PRESSURE: 162 MMHG

## 2020-03-26 DIAGNOSIS — J45.21 MILD INTERMITTENT ASTHMA WITH EXACERBATION: ICD-10-CM

## 2020-03-26 DIAGNOSIS — R05.9 COUGH: ICD-10-CM

## 2020-03-26 PROCEDURE — 99203 OFFICE O/P NEW LOW 30 MIN: CPT | Performed by: FAMILY MEDICINE

## 2020-03-26 RX ORDER — ALBUTEROL SULFATE 90 UG/1
2 AEROSOL, METERED RESPIRATORY (INHALATION) EVERY 4 HOURS PRN
Qty: 1 INHALER | Refills: 0 | Status: SHIPPED
Start: 2020-03-26 | End: 2020-03-26

## 2020-03-26 RX ORDER — ALBUTEROL SULFATE 90 UG/1
2 AEROSOL, METERED RESPIRATORY (INHALATION) EVERY 4 HOURS PRN
Qty: 1 INHALER | Refills: 0 | Status: SHIPPED | OUTPATIENT
Start: 2020-03-26 | End: 2020-07-20

## 2020-03-26 RX ORDER — HYDROCHLOROTHIAZIDE 25 MG/1
TABLET ORAL
COMMUNITY
Start: 2020-03-25 | End: 2020-07-20

## 2020-03-26 RX ORDER — PREDNISONE 20 MG/1
40 TABLET ORAL DAILY
Qty: 10 TAB | Refills: 0 | Status: SHIPPED | OUTPATIENT
Start: 2020-03-26 | End: 2020-03-31

## 2020-03-26 RX ORDER — PREDNISONE 20 MG/1
40 TABLET ORAL DAILY
Qty: 10 TAB | Refills: 0 | Status: SHIPPED
Start: 2020-03-26 | End: 2020-03-26

## 2020-03-26 SDOH — HEALTH STABILITY: MENTAL HEALTH: HOW OFTEN DO YOU HAVE A DRINK CONTAINING ALCOHOL?: 4 OR MORE TIMES A WEEK

## 2020-03-26 ASSESSMENT — ENCOUNTER SYMPTOMS
NAUSEA: 0
VOMITING: 0
EYE DISCHARGE: 0
EYE REDNESS: 0
MYALGIAS: 0
WEIGHT LOSS: 0

## 2020-03-26 ASSESSMENT — FIBROSIS 4 INDEX: FIB4 SCORE: 3.81

## 2020-03-26 NOTE — PROGRESS NOTES
"Subjective:      Ashleigh Marquis is a 59 y.o. female who presents with Cough (ISO- Travelled to [place] returned to [place] on 3/18/2020)            2 days SOB/wheeze thinks due to cold weather. PMH asthma. Out of inhaler. Associated dry cough without fever. Intermittent nasal congestion. No ST. OTC tylenol with some improvement. No other aggravating or alleviating factors.        Review of Systems   Constitutional: Negative for malaise/fatigue and weight loss.   Eyes: Negative for discharge and redness.   Cardiovascular: Negative for leg swelling.   Gastrointestinal: Negative for nausea and vomiting.   Musculoskeletal: Negative for joint pain and myalgias.   Skin: Negative for itching and rash.     .  Medications, Allergies, and current problem list reviewed today in Epic       Objective:     BP (!) 162/98   Pulse (!) 111   Temp 37.1 °C (98.7 °F)   Ht 1.626 m (5' 4\")   Wt 72.6 kg (160 lb)   LMP 11/02/2015   SpO2 94%   BMI 27.46 kg/m²      Physical Exam  Constitutional:       General: She is not in acute distress.     Appearance: She is well-developed.   HENT:      Head: Normocephalic and atraumatic.   Eyes:      Conjunctiva/sclera: Conjunctivae normal.   Cardiovascular:      Rate and Rhythm: Normal rate and regular rhythm.      Heart sounds: Normal heart sounds. No murmur.   Pulmonary:      Effort: Pulmonary effort is normal.      Breath sounds: Wheezing (few) present.   Musculoskeletal:      Right lower leg: No edema.   Skin:     General: Skin is warm and dry.      Findings: No rash.   Neurological:      Mental Status: She is alert and oriented to person, place, and time.                 Assessment/Plan:     1. Mild intermittent asthma with exacerbation  albuterol 108 (90 Base) MCG/ACT Aero Soln inhalation aerosol    predniSONE (DELTASONE) 20 MG Tab   2. Cough       Differential diagnosis, natural history, supportive care, and indications for immediate follow-up discussed at length.     Self Isolation " per c19 protocol

## 2020-03-27 ENCOUNTER — TELEPHONE (OUTPATIENT)
Dept: URGENT CARE | Facility: CLINIC | Age: 60
End: 2020-03-27

## 2020-03-27 NOTE — TELEPHONE ENCOUNTER
Patient does not have a valid phone number on file. Pharmacy does not have the proventil brand inhaler, requested for prior auth on a different brand or change order. Medicaid is not approving other medications, would like us to send to a different pharmacy. I was not able to reach the patient for a different pharmacy, she does not have a valid phone number. Number on file is disconnected.

## 2020-03-30 ENCOUNTER — HOSPITAL ENCOUNTER (EMERGENCY)
Dept: HOSPITAL 8 - ED | Age: 60
Discharge: HOME | End: 2020-03-30
Payer: MEDICAID

## 2020-03-30 VITALS — BODY MASS INDEX: 29.38 KG/M2 | HEIGHT: 65 IN | WEIGHT: 176.37 LBS

## 2020-03-30 VITALS — SYSTOLIC BLOOD PRESSURE: 130 MMHG | DIASTOLIC BLOOD PRESSURE: 80 MMHG
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
## 2020-03-30 DIAGNOSIS — I10: ICD-10-CM
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
## 2020-03-30 DIAGNOSIS — K21.9: ICD-10-CM
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
## 2020-03-30 DIAGNOSIS — S50.11XA: Primary | ICD-10-CM
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
## 2020-03-30 DIAGNOSIS — E11.9: ICD-10-CM

## 2020-03-30 DIAGNOSIS — F10.20: ICD-10-CM

## 2020-03-30 PROCEDURE — 99284 EMERGENCY DEPT VISIT MOD MDM: CPT

## 2020-04-02 ENCOUNTER — HOSPITAL ENCOUNTER (EMERGENCY)
Facility: MEDICAL CENTER | Age: 60
End: 2020-04-02
Payer: MEDICAID

## 2020-04-02 VITALS
HEIGHT: 64 IN | OXYGEN SATURATION: 97 % | DIASTOLIC BLOOD PRESSURE: 109 MMHG | RESPIRATION RATE: 16 BRPM | SYSTOLIC BLOOD PRESSURE: 160 MMHG | WEIGHT: 170.19 LBS | TEMPERATURE: 97.1 F | BODY MASS INDEX: 29.06 KG/M2 | HEART RATE: 90 BPM

## 2020-04-02 PROCEDURE — 302449 STATCHG TRIAGE ONLY (STATISTIC)

## 2020-04-02 ASSESSMENT — FIBROSIS 4 INDEX: FIB4 SCORE: 3.81

## 2020-04-03 NOTE — ED TRIAGE NOTES
"Chief Complaint   Patient presents with   • Alcohol Intoxication     last drink 2100. 1/5th daily.       Pt presents to ed for etoh abuse, detox.  Pt typically drinks 1/5th a day, last drink 2100. Pt tremulous in triage.     Charge aware of pt. Pt to G34    /109   Pulse 90   Temp 36.2 °C (97.1 °F)   Resp 16   Ht 1.626 m (5' 4\")   Wt 77.2 kg (170 lb 3.1 oz)   LMP 11/02/2015   SpO2 97%   BMI 29.21 kg/m²    "

## 2020-04-08 NOTE — ED NOTES
Dupixent 300mg   Received: Today   Message Contents   PADDY Ansari Pool    Dupixent 300mg - Pending Provider Response     Rx Insurance: Medicaid       Please review and sign a new form requested by Medicaid.  Fax back to [phone]. Form was faxed   3/26/2020.     4/8/20   Tita Arce        Medications found in pt belongings.  Clonazepam 0.5 mg counted by ERP and RN - 80 tablets, filled on 9/18.  Other medications updated in med rec; two pharmacies used, [address] and [address].  Medications placed in pharmacy security bag and given to satellite pharmacist.

## 2020-04-09 ENCOUNTER — HOSPITAL ENCOUNTER (EMERGENCY)
Facility: MEDICAL CENTER | Age: 60
End: 2020-04-09
Attending: EMERGENCY MEDICINE
Payer: MEDICAID

## 2020-04-09 ENCOUNTER — APPOINTMENT (OUTPATIENT)
Dept: RADIOLOGY | Facility: MEDICAL CENTER | Age: 60
End: 2020-04-09
Attending: EMERGENCY MEDICINE
Payer: MEDICAID

## 2020-04-09 VITALS
BODY MASS INDEX: 29.19 KG/M2 | HEART RATE: 89 BPM | HEIGHT: 64 IN | WEIGHT: 171 LBS | OXYGEN SATURATION: 98 % | RESPIRATION RATE: 16 BRPM

## 2020-04-09 DIAGNOSIS — F10.920 ALCOHOLIC INTOXICATION WITHOUT COMPLICATION (HCC): ICD-10-CM

## 2020-04-09 PROCEDURE — 71045 X-RAY EXAM CHEST 1 VIEW: CPT

## 2020-04-09 PROCEDURE — 99284 EMERGENCY DEPT VISIT MOD MDM: CPT | Mod: 25

## 2020-04-09 ASSESSMENT — FIBROSIS 4 INDEX: FIB4 SCORE: 3.81

## 2020-04-09 NOTE — ED NOTES
Pt refusing to keep on mask and keep door closed, requesting to leave AMA. ERP aware. Pt ambulating with steady gate, A&OX4. Pt dressed herself and disconnected monitoring devices, refusing vitals at this time.
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
## 2020-04-09 NOTE — ED TRIAGE NOTES
Pt bib ems for ETOH + cough. Pt has drank 2 pints of vodka today. Pt c/o cough and sore throat, no cough noted at this time. Mask placed on pt, appropriate PPE donned by all staff. Pt apparently intoxicated, demonstrates poor judgement and uncooperativeness, but redirectable.

## 2020-04-09 NOTE — ED PROVIDER NOTES
"ED Provider Note    CHIEF COMPLAINT  Chief Complaint   Patient presents with   • ETOH Intoxication   • Cough       HPI  Ashleigh Marquis is a 59 y.o. female who presents to the emergency department pain that she drank in excess amount of alcohol form of 2 pints of vodka earlier today.  She denies thoughts of hurting herself or hurting someone else, fever, shakes, chills, sweats.  She does state that she had a slight cough yesterday but has had no cough today.  Is unsure of any COVID exposure.    REVIEW OF SYSTEMS  Positives as above. Pertinent negatives include fever, shakes, chills, sweats, chest pain, nausea, vomiting all other review of systems are negative    PAST MEDICAL HISTORY  Past Medical History:   Diagnosis Date   • Alcoholism (HCC)    • Anxiety    • Arthritis    • ASTHMA    • Cancer (HCC) 1981    cervical   • Chronic low back pain    • Congestive heart failure (HCC)    • Depression    • EtOH dependence (HCC)    • Fall     alcohol related   • GERD (gastroesophageal reflux disease)    • HTN    • Hypertension    • Indigestion     GERD   • Muscle disorder    • OSTEOPOROSIS    • Other specified symptom associated with female genital organs     \"I have fibroids bad\"\"   • Psychiatric disorder    • PTSD (post-traumatic stress disorder)    • Renal disorder     Hx of stones   • Seizure (HCC)     several years ago r/t alcohol withdrawl   • Ulcer    • Vitamin D deficiency        FAMILY HISTORY  Noncontributory    SOCIAL HISTORY  Social History     Socioeconomic History   • Marital status:      Spouse name: Not on file   • Number of children: Not on file   • Years of education: Not on file   • Highest education level: Not on file   Occupational History   • Not on file   Social Needs   • Financial resource strain: Hard   • Food insecurity     Worry: Sometimes true     Inability: Sometimes true   • Transportation needs     Medical: Yes     Non-medical: Yes   Tobacco Use   • Smoking status: Current Every Day " Smoker     Packs/day: 0.50     Years: 20.00     Pack years: 10.00     Types: Cigarettes   • Smokeless tobacco: Never Used   • Tobacco comment: 1/2 pack per day   Substance and Sexual Activity   • Alcohol use: Yes     Frequency: 4 or more times a week     Comment: vodka, heavy use   • Drug use: No   • Sexual activity: Never     Partners: Male   Lifestyle   • Physical activity     Days per week: Not on file     Minutes per session: Not on file   • Stress: Not on file   Relationships   • Social connections     Talks on phone: Not on file     Gets together: Not on file     Attends [employer] service: Not on file     Active member of club or organization: Not on file     Attends meetings of clubs or organizations: Not on file     Relationship status: Not on file   • Intimate partner violence     Fear of current or ex partner: Not on file     Emotionally abused: Not on file     Physically abused: Not on file     Forced sexual activity: Not on file   Other Topics Concern   • Not on file   Social History Narrative    ** Merged History Encounter **            SURGICAL HISTORY  Past Surgical History:   Procedure Laterality Date   • GASTROSCOPY-ENDO N/A 10/3/2018    Procedure: GASTROSCOPY-ENDO;  Surgeon: Ben Negro M.D.;  Location: ENDOSCOPY [facility];  Service: Gastroenterology   • CYSTOSCOPY STENT PLACEMENT  11/9/2010    Performed by ANGEL HARRIS at [facility]   • URETEROSCOPY  11/9/2010    Performed by ANGEL HARRIS at [facility]   • LASERTRIPSY  11/9/2010    Performed by ANGEL HARRIS at [facility]   • STENT REMOVAL  11/9/2010    Performed by ANGEL HARRIS at [facility]   • CYSTO STENT PLACEMNT PRE SURG  10/7/2010    Performed by ANGEL HARRIS at [facility]   • HERNIA REPAIR  1977       CURRENT MEDICATIONS  Home Medications     Reviewed by Schuyler B Collett, R.N. (Registered Nurse) on 04/09/20 at 1357  Med List Status: <None>   Medication  "Last Dose Status   acetaminophen (TYLENOL) 325 MG Tab  Active   albuterol 108 (90 Base) MCG/ACT Aero Soln inhalation aerosol  Active   doxepin (SINEQUAN) 10 MG Cap  Active   hydroCHLOROthiazide (HYDRODIURIL) 25 MG Tab  Active   lisinopril (PRINIVIL) 10 MG Tab  Active   loperamide (IMODIUM) 2 MG Cap  Active   Magnesium 400 MG Tab  Active   metoprolol (LOPRESSOR) 25 MG Tab  Active   potassium chloride SA (KDUR) 20 MEQ Tab CR  Active   QUEtiapine (SEROQUEL) 25 MG Tab  Active                ALLERGIES  Allergies   Allergen Reactions   • Sulfa Drugs Vomiting   • Toradol Vomiting       PHYSICAL EXAM  VITAL SIGNS: Pulse 89   Resp 16   Ht 1.626 m (5' 4\")   Wt 77.6 kg (171 lb)   LMP 11/02/2015   SpO2 98%   BMI 29.35 kg/m²      Constitutional: Well developed, Well nourished, No acute distress, Non-toxic appearance.   Eyes: PERRLA, EOMI, Conjunctiva normal, No discharge.   Cardiovascular: Normal heart rate, Normal rhythm, No murmurs, No rubs, No gallops, and intact distal pulses.   Thorax & Lungs:  No respiratory distress, no rales, no rhonchi, No wheezing, No chest wall tenderness.   Abdomen: Bowel sounds normal, Soft, No tenderness, No guarding, No rebound, No pulsatile masses.   Skin: Warm, Dry, No erythema, No rash.   Extremities: Full range of motion, no deformity, no edema.  Neurologic: Alert & oriented x 3, slight slurred speech, redirectable, conversant, No focal deficits noted, acting appropriately on exam.  Psychiatric: Denies suicidal homicidal ideation      RADIOLOGY/PROCEDURES  DX-CHEST-LIMITED (1 VIEW)   Final Result      Hypoinflation without other evidence for acute cardiopulmonary disease.            COURSE & MEDICAL DECISION MAKING  Pertinent Labs & Imaging studies reviewed. (See chart for details)  This is a 59-year-old female presents with alcohol intoxication.  Patient states she had a cough yesterday no symptoms today.  X-ray was completed for possible intrathoracic abnormality.  The patient has no " evidence of severe hypoxia, significant abnormal vital signs.  The patient does appear to be intoxicated.  The patient was reevaluated at 1520, she is clinically sober, she is ambling throughout the department, she is alert and oriented no focal deficits.  The patient safe for discharge at this point.    FINAL IMPRESSION     1. Alcoholic intoxication without complication (HCC) Active         DISPOSITION:  Patient will be discharged home in stable condition.    FOLLOW UP:  [facility], Emergency Dept  [facility] [facility] [id number] [phone]    If symptoms worsen      Electronically signed by: Evaristo Wheeler D.O., 4/9/2020 2:44 PM

## 2020-04-09 NOTE — DISCHARGE INSTRUCTIONS
You likely have a viral illness and may have COVID-19. At this point we do not have testing available in the outpatient setting here in the emergency department for COVID-19. We also have limited testing for flu and other viral illnesses due to national shortages.  Therefore, COVID-19 is not ruled out and you will need to remain in home quarantine until all three of the following are true:  You are 7 days from symptom onset  your symptoms are improving   you have been fever free for at least 72hours.  Testing is only occurring through the [facility] district at this point; you may call them at [phone].  After a phone triage process you may or may not be tested.  If you develop significant shortness of breath, meaning that it is difficult for you to walk even short distances without having to stop and catch your breath, or you become severely dizzy and this is persistent then please return to the emergency department.

## 2020-04-09 NOTE — ED NOTES
Patient to door asking how much longer.  Advised soon.  Patient insisted on keeping door open and I explained that it was necessary to keep door shut.  Patient slammed door narrowly missing my hand

## 2020-04-10 ENCOUNTER — HOSPITAL ENCOUNTER (EMERGENCY)
Dept: HOSPITAL 8 - ED | Age: 60
Discharge: HOME | End: 2020-04-10
Payer: MEDICAID

## 2020-04-10 VITALS — BODY MASS INDEX: 28.6 KG/M2 | HEIGHT: 64 IN | WEIGHT: 167.55 LBS

## 2020-04-10 VITALS — SYSTOLIC BLOOD PRESSURE: 149 MMHG | DIASTOLIC BLOOD PRESSURE: 91 MMHG

## 2020-04-10 DIAGNOSIS — B97.89: ICD-10-CM

## 2020-04-10 DIAGNOSIS — J02.8: Primary | ICD-10-CM

## 2020-04-10 DIAGNOSIS — E11.9: ICD-10-CM

## 2020-04-10 DIAGNOSIS — F10.120: ICD-10-CM
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
## 2020-04-10 DIAGNOSIS — I10: ICD-10-CM

## 2020-04-10 PROCEDURE — 87081 CULTURE SCREEN ONLY: CPT

## 2020-04-10 PROCEDURE — 71045 X-RAY EXAM CHEST 1 VIEW: CPT

## 2020-04-10 PROCEDURE — 87880 STREP A ASSAY W/OPTIC: CPT

## 2020-04-10 PROCEDURE — 99284 EMERGENCY DEPT VISIT MOD MDM: CPT

## 2020-04-16 ENCOUNTER — HOSPITAL ENCOUNTER (EMERGENCY)
Dept: HOSPITAL 8 - ED | Age: 60
Discharge: HOME | End: 2020-04-16
Payer: MEDICAID

## 2020-04-16 VITALS — DIASTOLIC BLOOD PRESSURE: 94 MMHG | SYSTOLIC BLOOD PRESSURE: 140 MMHG

## 2020-04-16 VITALS — WEIGHT: 178.57 LBS | HEIGHT: 67 IN | BODY MASS INDEX: 28.03 KG/M2
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
## 2020-04-16 DIAGNOSIS — M54.5: ICD-10-CM

## 2020-04-16 DIAGNOSIS — E87.6: ICD-10-CM

## 2020-04-16 DIAGNOSIS — E87.0: ICD-10-CM

## 2020-04-16 DIAGNOSIS — K21.9: ICD-10-CM

## 2020-04-16 DIAGNOSIS — I95.9: ICD-10-CM

## 2020-04-16 DIAGNOSIS — G89.29: Primary | ICD-10-CM

## 2020-04-16 DIAGNOSIS — F10.10: ICD-10-CM

## 2020-04-16 DIAGNOSIS — E11.9: ICD-10-CM

## 2020-04-16 DIAGNOSIS — I10: ICD-10-CM

## 2020-04-16 LAB
ALBUMIN SERPL-MCNC: 3.8 G/DL (ref 3.4–5)
ALP SERPL-CCNC: 240 U/L (ref 45–117)
ALT SERPL-CCNC: 86 U/L (ref 12–78)
ANION GAP SERPL CALC-SCNC: 13 MMOL/L (ref 5–15)
BASOPHILS # BLD AUTO: 0.05 X10^3/UL (ref 0–0.1)
BASOPHILS NFR BLD AUTO: 1 % (ref 0–1)
BILIRUB SERPL-MCNC: 0.6 MG/DL (ref 0.2–1)
CALCIUM SERPL-MCNC: 9.3 MG/DL (ref 8.5–10.1)
CHLORIDE SERPL-SCNC: 97 MMOL/L (ref 98–107)
CREAT SERPL-MCNC: 0.97 MG/DL (ref 0.55–1.02)
CULTURE INDICATED?: YES
EOSINOPHIL # BLD AUTO: 0.04 X10^3/UL (ref 0–0.4)
EOSINOPHIL NFR BLD AUTO: 1 % (ref 1–7)
ERYTHROCYTE [DISTWIDTH] IN BLOOD BY AUTOMATED COUNT: 19.1 % (ref 9.6–15.2)
LYMPHOCYTES # BLD AUTO: 1.3 X10^3/UL (ref 1–3.4)
LYMPHOCYTES NFR BLD AUTO: 24 % (ref 22–44)
MCH RBC QN AUTO: 30 PG (ref 27–34.8)
MCHC RBC AUTO-ENTMCNC: 33.1 G/DL (ref 32.4–35.8)
MCV RBC AUTO: 90.6 FL (ref 80–100)
MD: NO
MICROSCOPIC: (no result)
MONOCYTES # BLD AUTO: 0.48 X10^3/UL (ref 0.2–0.8)
MONOCYTES NFR BLD AUTO: 9 % (ref 2–9)
NEUTROPHILS # BLD AUTO: 3.58 X10^3/UL (ref 1.8–6.8)
NEUTROPHILS NFR BLD AUTO: 66 % (ref 42–75)
PLATELET # BLD AUTO: 282 X10^3/UL (ref 130–400)
PMV BLD AUTO: 7.5 FL (ref 7.4–10.4)
PROT SERPL-MCNC: 8.2 G/DL (ref 6.4–8.2)
RBC # BLD AUTO: 3.96 X10^6/UL (ref 3.82–5.3)

## 2020-04-16 PROCEDURE — 85025 COMPLETE CBC W/AUTO DIFF WBC: CPT

## 2020-04-16 PROCEDURE — 80053 COMPREHEN METABOLIC PANEL: CPT

## 2020-04-16 PROCEDURE — 87147 CULTURE TYPE IMMUNOLOGIC: CPT

## 2020-04-16 PROCEDURE — 99283 EMERGENCY DEPT VISIT LOW MDM: CPT

## 2020-04-16 PROCEDURE — 36415 COLL VENOUS BLD VENIPUNCTURE: CPT

## 2020-04-16 PROCEDURE — 81001 URINALYSIS AUTO W/SCOPE: CPT

## 2020-04-16 PROCEDURE — 87086 URINE CULTURE/COLONY COUNT: CPT

## 2020-04-23 ENCOUNTER — HOSPITAL ENCOUNTER (EMERGENCY)
Dept: HOSPITAL 8 - ED | Age: 60
Discharge: HOME | End: 2020-04-23
Payer: MEDICAID

## 2020-04-23 VITALS — WEIGHT: 154.32 LBS | BODY MASS INDEX: 27.34 KG/M2 | HEIGHT: 63 IN

## 2020-04-23 VITALS — DIASTOLIC BLOOD PRESSURE: 84 MMHG | SYSTOLIC BLOOD PRESSURE: 124 MMHG

## 2020-04-23 DIAGNOSIS — F17.200: ICD-10-CM

## 2020-04-23 DIAGNOSIS — E11.9: ICD-10-CM

## 2020-04-23 DIAGNOSIS — K21.9: ICD-10-CM
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
## 2020-04-23 DIAGNOSIS — F10.120: Primary | ICD-10-CM

## 2020-04-23 PROCEDURE — 99283 EMERGENCY DEPT VISIT LOW MDM: CPT

## 2020-04-24 ENCOUNTER — HOSPITAL ENCOUNTER (EMERGENCY)
Facility: MEDICAL CENTER | Age: 60
End: 2020-04-24
Attending: EMERGENCY MEDICINE
Payer: MEDICAID

## 2020-04-24 ENCOUNTER — HOSPITAL ENCOUNTER (EMERGENCY)
Dept: HOSPITAL 8 - ED | Age: 60
Discharge: HOME | End: 2020-04-24
Payer: MEDICAID

## 2020-04-24 VITALS
WEIGHT: 171.96 LBS | DIASTOLIC BLOOD PRESSURE: 78 MMHG | HEART RATE: 78 BPM | TEMPERATURE: 98.1 F | BODY MASS INDEX: 29.36 KG/M2 | RESPIRATION RATE: 19 BRPM | HEIGHT: 64 IN | OXYGEN SATURATION: 98 % | SYSTOLIC BLOOD PRESSURE: 141 MMHG

## 2020-04-24 VITALS — DIASTOLIC BLOOD PRESSURE: 94 MMHG | SYSTOLIC BLOOD PRESSURE: 133 MMHG

## 2020-04-24 VITALS — HEIGHT: 60 IN | WEIGHT: 176.37 LBS | BODY MASS INDEX: 34.63 KG/M2

## 2020-04-24 DIAGNOSIS — S06.0X0A: Primary | ICD-10-CM

## 2020-04-24 DIAGNOSIS — I10: ICD-10-CM

## 2020-04-24 DIAGNOSIS — K29.20 ACUTE ALCOHOLIC GASTRITIS WITHOUT HEMORRHAGE: ICD-10-CM
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
## 2020-04-24 DIAGNOSIS — F10.929 ALCOHOLIC INTOXICATION WITH COMPLICATION (HCC): ICD-10-CM
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
## 2020-04-24 DIAGNOSIS — R10.13 EPIGASTRIC PAIN: ICD-10-CM

## 2020-04-24 DIAGNOSIS — F17.210: ICD-10-CM
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
## 2020-04-24 DIAGNOSIS — Z72.9: ICD-10-CM

## 2020-04-24 DIAGNOSIS — K21.9: ICD-10-CM

## 2020-04-24 DIAGNOSIS — S80.212A: ICD-10-CM

## 2020-04-24 DIAGNOSIS — F10.20: ICD-10-CM

## 2020-04-24 DIAGNOSIS — E11.9: ICD-10-CM

## 2020-04-24 LAB
ALBUMIN SERPL BCP-MCNC: 3.6 G/DL (ref 3.2–4.9)
ALBUMIN/GLOB SERPL: 1.1 G/DL
ALP SERPL-CCNC: 130 U/L (ref 30–99)
ALT SERPL-CCNC: 37 U/L (ref 2–50)
ANION GAP SERPL CALC-SCNC: 18 MMOL/L (ref 7–16)
ANISOCYTOSIS BLD QL SMEAR: ABNORMAL
AST SERPL-CCNC: 89 U/L (ref 12–45)
BASOPHILS # BLD AUTO: 2.4 % (ref 0–1.8)
BASOPHILS # BLD: 0.1 K/UL (ref 0–0.12)
BILIRUB SERPL-MCNC: 0.2 MG/DL (ref 0.1–1.5)
BUN SERPL-MCNC: 9 MG/DL (ref 8–22)
CALCIUM SERPL-MCNC: 8.9 MG/DL (ref 8.5–10.5)
CHLORIDE SERPL-SCNC: 101 MMOL/L (ref 96–112)
CO2 SERPL-SCNC: 23 MMOL/L (ref 20–33)
COMMENT 1642: NORMAL
CREAT SERPL-MCNC: 0.57 MG/DL (ref 0.5–1.4)
EOSINOPHIL # BLD AUTO: 0.12 K/UL (ref 0–0.51)
EOSINOPHIL NFR BLD: 2.8 % (ref 0–6.9)
ERYTHROCYTE [DISTWIDTH] IN BLOOD BY AUTOMATED COUNT: 67.2 FL (ref 35.9–50)
GLOBULIN SER CALC-MCNC: 3.4 G/DL (ref 1.9–3.5)
GLUCOSE SERPL-MCNC: 87 MG/DL (ref 65–99)
HCT VFR BLD AUTO: 37.5 % (ref 37–47)
HGB BLD-MCNC: 12 G/DL (ref 12–16)
HYPOCHROMIA BLD QL SMEAR: ABNORMAL
IMM GRANULOCYTES # BLD AUTO: 0.03 K/UL (ref 0–0.11)
IMM GRANULOCYTES NFR BLD AUTO: 0.7 % (ref 0–0.9)
LIPASE SERPL-CCNC: 56 U/L (ref 11–82)
LYMPHOCYTES # BLD AUTO: 1.8 K/UL (ref 1–4.8)
LYMPHOCYTES NFR BLD: 42.5 % (ref 22–41)
MACROCYTES BLD QL SMEAR: ABNORMAL
MCH RBC QN AUTO: 31.2 PG (ref 27–33)
MCHC RBC AUTO-ENTMCNC: 32 G/DL (ref 33.6–35)
MCV RBC AUTO: 97.4 FL (ref 81.4–97.8)
MICROCYTES BLD QL SMEAR: ABNORMAL
MONOCYTES # BLD AUTO: 0.67 K/UL (ref 0–0.85)
MONOCYTES NFR BLD AUTO: 15.8 % (ref 0–13.4)
MORPHOLOGY BLD-IMP: NORMAL
NEUTROPHILS # BLD AUTO: 1.52 K/UL (ref 2–7.15)
NEUTROPHILS NFR BLD: 35.8 % (ref 44–72)
NRBC # BLD AUTO: 0 K/UL
NRBC BLD-RTO: 0 /100 WBC
PLATELET # BLD AUTO: 236 K/UL (ref 164–446)
PLATELET BLD QL SMEAR: NORMAL
PMV BLD AUTO: 9.6 FL (ref 9–12.9)
POLYCHROMASIA BLD QL SMEAR: NORMAL
POTASSIUM SERPL-SCNC: 3.3 MMOL/L (ref 3.6–5.5)
PROT SERPL-MCNC: 7 G/DL (ref 6–8.2)
RBC # BLD AUTO: 3.85 M/UL (ref 4.2–5.4)
RBC BLD AUTO: PRESENT
SODIUM SERPL-SCNC: 142 MMOL/L (ref 135–145)
WBC # BLD AUTO: 4.2 K/UL (ref 4.8–10.8)

## 2020-04-24 PROCEDURE — 85025 COMPLETE CBC W/AUTO DIFF WBC: CPT

## 2020-04-24 PROCEDURE — 70450 CT HEAD/BRAIN W/O DYE: CPT

## 2020-04-24 PROCEDURE — 83690 ASSAY OF LIPASE: CPT

## 2020-04-24 PROCEDURE — 700102 HCHG RX REV CODE 250 W/ 637 OVERRIDE(OP): Performed by: EMERGENCY MEDICINE

## 2020-04-24 PROCEDURE — 99285 EMERGENCY DEPT VISIT HI MDM: CPT

## 2020-04-24 PROCEDURE — A9270 NON-COVERED ITEM OR SERVICE: HCPCS | Performed by: EMERGENCY MEDICINE

## 2020-04-24 PROCEDURE — 80053 COMPREHEN METABOLIC PANEL: CPT

## 2020-04-24 PROCEDURE — 99406 BEHAV CHNG SMOKING 3-10 MIN: CPT

## 2020-04-24 PROCEDURE — 99284 EMERGENCY DEPT VISIT MOD MDM: CPT

## 2020-04-24 PROCEDURE — 700111 HCHG RX REV CODE 636 W/ 250 OVERRIDE (IP): Performed by: EMERGENCY MEDICINE

## 2020-04-24 RX ORDER — ONDANSETRON 2 MG/ML
4 INJECTION INTRAMUSCULAR; INTRAVENOUS ONCE
Status: DISCONTINUED | OUTPATIENT
Start: 2020-04-24 | End: 2020-04-24

## 2020-04-24 RX ORDER — ONDANSETRON 4 MG/1
4 TABLET, ORALLY DISINTEGRATING ORAL ONCE
Status: COMPLETED | OUTPATIENT
Start: 2020-04-24 | End: 2020-04-24

## 2020-04-24 RX ADMIN — ONDANSETRON 4 MG: 4 TABLET, ORALLY DISINTEGRATING ORAL at 01:16

## 2020-04-24 RX ADMIN — LIDOCAINE HYDROCHLORIDE 30 ML: 20 SOLUTION OROPHARYNGEAL at 01:16

## 2020-04-24 ASSESSMENT — LIFESTYLE VARIABLES
HAVE YOU EVER FELT YOU SHOULD CUT DOWN ON YOUR DRINKING: YES
TOTAL SCORE: 4
CONSUMPTION TOTAL: INCOMPLETE
DO YOU DRINK ALCOHOL: YES
EVER FELT BAD OR GUILTY ABOUT YOUR DRINKING: YES
HAVE PEOPLE ANNOYED YOU BY CRITICIZING YOUR DRINKING: YES
TOTAL SCORE: 4
TOTAL SCORE: 4
EVER HAD A DRINK FIRST THING IN THE MORNING TO STEADY YOUR NERVES TO GET RID OF A HANGOVER: YES
DOES PATIENT WANT TO STOP DRINKING: NO

## 2020-04-24 ASSESSMENT — ENCOUNTER SYMPTOMS
FEVER: 0
ABDOMINAL PAIN: 1
HEADACHES: 0
VOMITING: 0

## 2020-04-24 ASSESSMENT — FIBROSIS 4 INDEX: FIB4 SCORE: 3.81

## 2020-04-24 NOTE — ED TRIAGE NOTES
"Chief Complaint   Patient presents with   • Abdominal Pain   • Alcohol Intoxication     BIB EMS to [facility], pt on monitor and in gown, MAGGIE Flores at bedside. Pt reports 8/10 stabbing LUQ pain starting today, pt also reports drinking a pint of vodka today.    Medications given en route: none    /80   Pulse 75   Resp 15   Ht 1.626 m (5' 4\")   Wt 78 kg (171 lb 15.3 oz)   LMP 11/02/2015   SpO2 95%   BMI 29.52 kg/m²   "

## 2020-04-24 NOTE — ED NOTES
Break RN: Lab arrived and could only get enough of a sample for the CBC, they will have another tech come down and attempt to obtain the rest of the labs needed.

## 2020-04-24 NOTE — ED NOTES
"Pt discharged home via ambulatory to lobby with steady gait, AOx4. Pt in possession of belongings. Pt provided discharge education and information pertaining to medications and follow up appointments. Pt received copy of discharge instructions and verbalized understanding.     /78   Pulse 78   Temp 36.7 °C (98.1 °F) (Temporal)   Resp 19   Ht 1.626 m (5' 4\")   Wt 78 kg (171 lb 15.3 oz)   LMP 11/02/2015   SpO2 98%   BMI 29.52 kg/m²   "

## 2020-04-24 NOTE — ED PROVIDER NOTES
"ED Provider Note    Scribed for Irena Flores M.D. by Amy Cox. 4/24/2020, 12:38 AM.    Means of arrival: EMS  History obtained from: patient  History limited by: none    CHIEF COMPLAINT  Chief Complaint   Patient presents with   • Abdominal Pain   • Alcohol Intoxication       HPI  Ashleigh Marquis is a 59 y.o. female who presents to the Emergency Department for sharp epigastric pain radiating to the  left upper quadrant abdominal pain onset a few hours ago. Per EMS patient was found outside of [facility] stating that she has not had \"enough to drink tonight\".  Patient admits to drinking a lot of alcohol and feels that this has contributed to her pain.  She describes her pain as a burning sensation that is moderate in severity.  She denies fevers, chills, chest pain, shortness of breath, and vomiting.    REVIEW OF SYSTEMS  Review of Systems   Constitutional: Negative for fever.   Cardiovascular: Negative for chest pain.   Gastrointestinal: Positive for abdominal pain. Negative for vomiting.   Neurological: Negative for headaches.   All other systems reviewed and are negative.  All other systems negative.     PAST MEDICAL HISTORY   has a past medical history of Alcoholism (HCC), Anxiety, Arthritis, ASTHMA, Cancer (HCC) (1981), Chronic low back pain, Congestive heart failure (HCC), Depression, EtOH dependence (HCC), Fall, GERD (gastroesophageal reflux disease), HTN, Hypertension, Indigestion, Muscle disorder, OSTEOPOROSIS, Other specified symptom associated with female genital organs, Psychiatric disorder, PTSD (post-traumatic stress disorder), Renal disorder, Seizure (HCC), Ulcer, and Vitamin D deficiency.    SURGICAL HISTORY   has a past surgical history that includes hernia repair (1977); cysto stent placemnt pre surg (10/7/2010); cystoscopy stent placement (11/9/2010); ureteroscopy (11/9/2010); lasertripsy (11/9/2010); stent removal (11/9/2010); and gastroscopy-endo (N/A, 10/3/2018).    SOCIAL " "HISTORY  Social History     Tobacco Use   • Smoking status: Current Every Day Smoker     Packs/day: 0.50     Years: 20.00     Pack years: 10.00     Types: Cigarettes   • Smokeless tobacco: Never Used   • Tobacco comment: 1/2 pack per day   Substance Use Topics   • Alcohol use: Yes     Frequency: 4 or more times a week     Comment: vodka, heavy use   • Drug use: No      Social History     Substance and Sexual Activity   Drug Use No       FAMILY HISTORY  Family History   Problem Relation Age of Onset   • Heart Disease Father    • Hypertension Father    • Diabetes Father    • Cancer Paternal Grandmother    • Depression Other    • Lung Disease Neg Hx    • Stroke Neg Hx        CURRENT MEDICATIONS  None pertinent    ALLERGIES  Allergies   Allergen Reactions   • Sulfa Drugs Vomiting   • Toradol Vomiting       PHYSICAL EXAM  VITAL SIGNS: /80   Pulse 75   Resp 15   Ht 1.626 m (5' 4\")   Wt 78 kg (171 lb 15.3 oz)   LMP 11/02/2015   SpO2 95%   BMI 29.52 kg/m²   Vitals reviewed by myself.  Nursing note and vitals reviewed.  Constitutional: Well-developed and well-nourished. No acute distress.   HENT: Head is normocephalic and atraumatic.  Eyes: extra-ocular movements intact  Cardiovascular: regular rate and regular rhythm. No murmur heard.  Pulmonary/Chest: Breath sounds normal. No wheezes or rales.   Abdominal: Negative Flores's sign, negative Mcburney's point. Soft and non-tender. No distention.  No rebound, no guarding  Musculoskeletal: Extremities exhibit normal range of motion without edema or tenderness.   Neurological: Awake and alert, slightly slurred speech  Skin: Skin is warm and dry. No rash.     DIAGNOSTIC STUDIES /  LABS  Labs Reviewed   CBC WITH DIFFERENTIAL - Abnormal; Notable for the following components:       Result Value    WBC 4.2 (*)     RBC 3.85 (*)     MCHC 32.0 (*)     RDW 67.2 (*)     Neutrophils-Polys 35.80 (*)     Lymphocytes 42.50 (*)     Monocytes 15.80 (*)     Basophils 2.40 (*)     " Neutrophils (Absolute) 1.52 (*)     All other components within normal limits   COMP METABOLIC PANEL - Abnormal; Notable for the following components:    Potassium 3.3 (*)     Anion Gap 18.0 (*)     AST(SGOT) 89 (*)     Alkaline Phosphatase 130 (*)     All other components within normal limits   LIPASE   PERIPHERAL SMEAR REVIEW   PLATELET ESTIMATE   MORPHOLOGY   DIFFERENTIAL COMMENT   ESTIMATED GFR       All labs reviewed by me.      REASSESSMENT  3:45 AM- Patient is awake and updated on results. Will attempt to ambulate.     COURSE & MEDICAL DECISION MAKING  Nursing notes, VS, PMSFHx reviewed in chart.    Patient is a 59-year-old female who comes in for evaluation of epigastric and left upper quadrant pain.  Differential diagnosis includes alcoholic gastritis, peptic ulcer disease, pancreatitis.  Diagnostic work-up includes labs.    Patient's initial vitals are within normal limits and abdominal exam is benign.  Patient is treated with GI cocktail and Zofran after which her symptoms completely resolved.  She is able to tolerate oral intake without difficulty.  Labs returned and are unremarkable, no evidence of pancreatitis.  Patient has a mild anion gap likely from dehydration and alcohol abuse, however as she is tolerating oral intake adenopathy IV fluids are indicated.  Patient is monitored in the emergency department until she is clinically sober.  Upon reassessment patient is tolerating oral intake, feels improved, is ambulating with a narrow base steady gait without assistance, and is alert and oriented.  Therefore she is reassured and advised on management of alcoholic gastritis.  She is then given strict return precautions and discharged in stable condition.      FINAL IMPRESSION  1. Alcoholic intoxication with complication (HCC)    2. Acute alcoholic gastritis without hemorrhage    3. Epigastric pain          Amy GAN (Cayetano)desi scribing for, and in the presence of, Irena Flores,  M.D..    Electronically signed by: Amy Cox (Cayetano), 4/24/2020    IIrena M.D. personally performed the services described in this documentation, as scribed by Amy Cox in my presence, and it is both accurate and complete.    The note accurately reflects work and decisions made by me.  Irena Flores M.D.  4/24/2020  4:57 AM

## 2020-05-04 ENCOUNTER — HOSPITAL ENCOUNTER (EMERGENCY)
Facility: MEDICAL CENTER | Age: 60
End: 2020-05-04
Attending: EMERGENCY MEDICINE
Payer: MEDICAID

## 2020-05-04 VITALS
BODY MASS INDEX: 27.59 KG/M2 | DIASTOLIC BLOOD PRESSURE: 96 MMHG | HEIGHT: 64 IN | RESPIRATION RATE: 18 BRPM | HEART RATE: 98 BPM | OXYGEN SATURATION: 94 % | WEIGHT: 161.6 LBS | SYSTOLIC BLOOD PRESSURE: 157 MMHG | TEMPERATURE: 97.2 F

## 2020-05-04 DIAGNOSIS — R10.2 VAGINAL PAIN: ICD-10-CM

## 2020-05-04 DIAGNOSIS — K62.89 RECTAL PAIN: ICD-10-CM

## 2020-05-04 DIAGNOSIS — T74.21XA SEXUAL ASSAULT OF ADULT, INITIAL ENCOUNTER: ICD-10-CM

## 2020-05-04 PROCEDURE — 99284 EMERGENCY DEPT VISIT MOD MDM: CPT

## 2020-05-04 SDOH — HEALTH STABILITY: MENTAL HEALTH: HOW OFTEN DO YOU HAVE 6 OR MORE DRINKS ON ONE OCCASION?: DAILY OR ALMOST DAILY

## 2020-05-04 ASSESSMENT — FIBROSIS 4 INDEX: FIB4 SCORE: 3.53

## 2020-05-04 NOTE — ED NOTES
Pt discharged home. Explained discharge instructions. Questions and concerns addressed. Pt verbalized understanding of instructions. Wristband removed. Pt advised to follow-up with SART or return to ED for any new or worsening of symptoms. Pt is ambulating well and steady on feet. VS stable. PD will be escorting pt to SART exam.
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
## 2020-05-06 ENCOUNTER — HOSPITAL ENCOUNTER (EMERGENCY)
Facility: MEDICAL CENTER | Age: 60
End: 2020-05-06
Attending: EMERGENCY MEDICINE
Payer: MEDICAID

## 2020-05-06 VITALS
BODY MASS INDEX: 27.31 KG/M2 | HEART RATE: 85 BPM | HEIGHT: 64 IN | TEMPERATURE: 98 F | WEIGHT: 160 LBS | RESPIRATION RATE: 18 BRPM | SYSTOLIC BLOOD PRESSURE: 160 MMHG | DIASTOLIC BLOOD PRESSURE: 84 MMHG | OXYGEN SATURATION: 99 %

## 2020-05-06 DIAGNOSIS — F10.929 ALCOHOLIC INTOXICATION WITH COMPLICATION (HCC): ICD-10-CM

## 2020-05-06 PROCEDURE — 99284 EMERGENCY DEPT VISIT MOD MDM: CPT

## 2020-05-06 ASSESSMENT — LIFESTYLE VARIABLES: DO YOU DRINK ALCOHOL: YES

## 2020-05-06 ASSESSMENT — FIBROSIS 4 INDEX: FIB4 SCORE: 3.53
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
## 2020-05-06 NOTE — ED NOTES
This RN received hand off report on patient from TETO Garcia   This RN's primary care of patient began at this time

## 2020-05-06 NOTE — ED NOTES
Pt verbally understands d/c instructions. No prescriptions given at this time. All questions answered. Pt stable and ambulatory upon discharge.
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
## 2020-05-18 ENCOUNTER — HOSPITAL ENCOUNTER (EMERGENCY)
Dept: HOSPITAL 8 - ED | Age: 60
LOS: 1 days | Discharge: HOME | End: 2020-05-19
Payer: MEDICAID

## 2020-05-18 VITALS — SYSTOLIC BLOOD PRESSURE: 182 MMHG | DIASTOLIC BLOOD PRESSURE: 115 MMHG

## 2020-05-18 VITALS — HEIGHT: 64 IN | WEIGHT: 168.87 LBS | BODY MASS INDEX: 28.83 KG/M2

## 2020-05-18 DIAGNOSIS — I10: ICD-10-CM

## 2020-05-18 DIAGNOSIS — A64: ICD-10-CM

## 2020-05-18 DIAGNOSIS — K21.9: ICD-10-CM

## 2020-05-18 DIAGNOSIS — N72: Primary | ICD-10-CM

## 2020-05-18 DIAGNOSIS — F17.210: ICD-10-CM

## 2020-05-18 DIAGNOSIS — Z72.9: ICD-10-CM

## 2020-05-18 DIAGNOSIS — E11.9: ICD-10-CM

## 2020-05-18 LAB
CLUE CELLS: (no result)
MICROSCOPIC: (no result)
T VAGINALIS RRNA GENITAL QL PROBE: (no result)
WET PREP WBCS: (no result)

## 2020-05-18 PROCEDURE — 81001 URINALYSIS AUTO W/SCOPE: CPT

## 2020-05-18 PROCEDURE — 87808 TRICHOMONAS ASSAY W/OPTIC: CPT

## 2020-05-18 PROCEDURE — 87491 CHLMYD TRACH DNA AMP PROBE: CPT

## 2020-05-18 PROCEDURE — 96372 THER/PROPH/DIAG INJ SC/IM: CPT

## 2020-05-18 PROCEDURE — 87210 SMEAR WET MOUNT SALINE/INK: CPT

## 2020-05-18 PROCEDURE — 99283 EMERGENCY DEPT VISIT LOW MDM: CPT

## 2020-05-18 PROCEDURE — 87086 URINE CULTURE/COLONY COUNT: CPT

## 2020-05-18 PROCEDURE — 99406 BEHAV CHNG SMOKING 3-10 MIN: CPT

## 2020-05-18 PROCEDURE — 87591 N.GONORRHOEAE DNA AMP PROB: CPT

## 2020-05-19 ENCOUNTER — HOSPITAL ENCOUNTER (EMERGENCY)
Dept: HOSPITAL 8 - ED | Age: 60
Discharge: LEFT BEFORE DISCHARGE | End: 2020-05-19
Payer: MEDICAID

## 2020-05-19 VITALS — DIASTOLIC BLOOD PRESSURE: 110 MMHG | SYSTOLIC BLOOD PRESSURE: 150 MMHG

## 2020-05-19 DIAGNOSIS — F41.9: Primary | ICD-10-CM

## 2020-05-19 DIAGNOSIS — I10: ICD-10-CM

## 2020-05-19 DIAGNOSIS — E11.9: ICD-10-CM

## 2020-05-19 DIAGNOSIS — K21.9: ICD-10-CM

## 2020-05-19 PROCEDURE — 99283 EMERGENCY DEPT VISIT LOW MDM: CPT

## 2020-05-27 ENCOUNTER — HOSPITAL ENCOUNTER (EMERGENCY)
Dept: HOSPITAL 8 - ED | Age: 60
Discharge: HOME | End: 2020-05-27
Payer: MEDICAID

## 2020-05-27 ENCOUNTER — HOSPITAL ENCOUNTER (EMERGENCY)
Facility: MEDICAL CENTER | Age: 60
End: 2020-05-27
Attending: EMERGENCY MEDICINE
Payer: MEDICAID

## 2020-05-27 VITALS
TEMPERATURE: 97.1 F | RESPIRATION RATE: 16 BRPM | OXYGEN SATURATION: 95 % | BODY MASS INDEX: 29.52 KG/M2 | DIASTOLIC BLOOD PRESSURE: 70 MMHG | WEIGHT: 171.96 LBS | HEART RATE: 95 BPM | SYSTOLIC BLOOD PRESSURE: 110 MMHG

## 2020-05-27 VITALS
TEMPERATURE: 97.7 F | DIASTOLIC BLOOD PRESSURE: 59 MMHG | HEART RATE: 87 BPM | OXYGEN SATURATION: 92 % | SYSTOLIC BLOOD PRESSURE: 98 MMHG | RESPIRATION RATE: 13 BRPM

## 2020-05-27 VITALS — HEIGHT: 68 IN | BODY MASS INDEX: 23.39 KG/M2 | WEIGHT: 154.32 LBS

## 2020-05-27 VITALS — DIASTOLIC BLOOD PRESSURE: 60 MMHG | SYSTOLIC BLOOD PRESSURE: 99 MMHG

## 2020-05-27 DIAGNOSIS — E11.9: ICD-10-CM

## 2020-05-27 DIAGNOSIS — I10: ICD-10-CM

## 2020-05-27 DIAGNOSIS — F10.920 ALCOHOLIC INTOXICATION WITHOUT COMPLICATION (HCC): ICD-10-CM

## 2020-05-27 DIAGNOSIS — F17.200: ICD-10-CM

## 2020-05-27 DIAGNOSIS — R41.82 ALTERED MENTAL STATUS, UNSPECIFIED ALTERED MENTAL STATUS TYPE: ICD-10-CM

## 2020-05-27 DIAGNOSIS — K21.9: ICD-10-CM

## 2020-05-27 DIAGNOSIS — F10.120: Primary | ICD-10-CM
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
## 2020-05-27 LAB
<PLATELET ESTIMATE>: (no result)
<PLT MORPHOLOGY>: (no result)
ALBUMIN SERPL-MCNC: 3.2 G/DL (ref 3.4–5)
ALP SERPL-CCNC: 130 U/L (ref 45–117)
ALT SERPL-CCNC: 31 U/L (ref 12–78)
ANION GAP SERPL CALC-SCNC: 6 MMOL/L (ref 5–15)
ANISOCYTOSIS BLD QL SMEAR: (no result)
BILIRUB SERPL-MCNC: 0.2 MG/DL (ref 0.2–1)
CALCIUM SERPL-MCNC: 8.6 MG/DL (ref 8.5–10.1)
CHLORIDE SERPL-SCNC: 106 MMOL/L (ref 98–107)
CREAT SERPL-MCNC: 0.94 MG/DL (ref 0.55–1.02)
EOS#(MANUAL): 0.2 X10^3/UL (ref 0–0.4)
EOS% (MANUAL): 3 % (ref 1–7)
ERYTHROCYTE [DISTWIDTH] IN BLOOD BY AUTOMATED COUNT: 22.5 % (ref 9.6–15.2)
INR PPP: 0.94 (ref 0.93–1.1)
LYMPH#(MANUAL): 2.18 X10^3/UL (ref 1–3.4)
LYMPHS% (MANUAL): 32 % (ref 22–44)
MCH RBC QN AUTO: 31.4 PG (ref 27–34.8)
MCHC RBC AUTO-ENTMCNC: 32.8 G/DL (ref 32.4–35.8)
MCV RBC AUTO: 95.7 FL (ref 80–100)
MD: YES
METAMYELOCYTES# (MANUAL): 0.07 X10^3/UL (ref 0–0)
METAMYELOCYTES% (MANUAL): 1 % (ref 0–1)
MONOS#(MANUAL): 0.41 X10^3/UL (ref 0.3–2.7)
MONOS% (MANUAL): 6 % (ref 2–9)
PLATELET # BLD AUTO: 524 X10^3/UL (ref 130–400)
PMV BLD AUTO: 8.2 FL (ref 7.4–10.4)
PROT SERPL-MCNC: 7.3 G/DL (ref 6.4–8.2)
PROTHROMBIN TIME: 10 SECONDS (ref 9.6–11.5)
RBC # BLD AUTO: 3.54 X10^6/UL (ref 3.82–5.3)
SEG#(MANUAL): 3.94 X10^3/UL (ref 1.8–6.8)
SEGS% (MANUAL): 58 % (ref 42–75)

## 2020-05-27 PROCEDURE — 85025 COMPLETE CBC W/AUTO DIFF WBC: CPT

## 2020-05-27 PROCEDURE — 83690 ASSAY OF LIPASE: CPT

## 2020-05-27 PROCEDURE — 99283 EMERGENCY DEPT VISIT LOW MDM: CPT

## 2020-05-27 PROCEDURE — 80053 COMPREHEN METABOLIC PANEL: CPT

## 2020-05-27 PROCEDURE — 99284 EMERGENCY DEPT VISIT MOD MDM: CPT

## 2020-05-27 PROCEDURE — 36415 COLL VENOUS BLD VENIPUNCTURE: CPT

## 2020-05-27 PROCEDURE — 302449 STATCHG TRIAGE ONLY (STATISTIC)

## 2020-05-27 PROCEDURE — 85610 PROTHROMBIN TIME: CPT

## 2020-05-27 RX ORDER — CHOLECALCIFEROL (VITAMIN D3) 125 MCG
500 CAPSULE ORAL ONCE
Status: DISCONTINUED | OUTPATIENT
Start: 2020-05-27 | End: 2020-05-27 | Stop reason: HOSPADM

## 2020-05-27 RX ORDER — THIAMINE MONONITRATE (VIT B1) 100 MG
100 TABLET ORAL ONCE
Status: DISCONTINUED | OUTPATIENT
Start: 2020-05-27 | End: 2020-05-27 | Stop reason: HOSPADM

## 2020-05-27 RX ORDER — FOLIC ACID 1 MG/1
1 TABLET ORAL ONCE
Status: DISCONTINUED | OUTPATIENT
Start: 2020-05-27 | End: 2020-05-27 | Stop reason: HOSPADM

## 2020-05-27 ASSESSMENT — FIBROSIS 4 INDEX: FIB4 SCORE: 3.53
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
## 2020-05-27 NOTE — DISCHARGE INSTRUCTIONS
You were seen in the ED today for alcohol intoxication. While here you were examined and allowed to sober up while we monitored you. Your physical exam was reassuring. If you continue to drink you will be placing your health at risk. At this time you are sober and can return home.    Please be alert for signs of alcohol withdrawal, including shaking hands, agitation, feeling pins and needles, nausea, vomiting, headache. Please seek medical care if you develop these. Alcohol withdrawal can be dangerous and even lead to seizures or death if untreated.      ================================  Coronavirus Information    Your visit did NOT relate to coronavirus, but if you or your family develop symptoms that concern you for coronavirus (please see CDC website for symptoms), please contact the [facility] hotline (or your local health department)  or your healthcare provider before going to a medical facility:    [facility]  24hr hotline: [phone]    Information is available from the Centers for Disease Control and Prevention  www.CDC.gov    and     [facility]  [email or web address]    If you are severely ill or having a hard time breathing, please immediatly seek medical care. Notify the  or Emergency Department Triage about your symptoms.

## 2020-05-27 NOTE — ED PROVIDER NOTES
"ED Provider Note      Means of Arrival: ***  History obtained from: ***  History limited by: ***    CHIEF COMPLAINT  Chief Complaint   Patient presents with   • Alcohol Intoxication     Pt found on street acutley intoxicated. Pt states she wants help with etoh abuse.        HPI  Ashleigh Marquis is a 59 y.o. female who presents ***    REVIEW OF SYSTEMS  CONSTITUTIONAL:  No fever.  CARDIOVASCULAR:  No chest discomfort.  RESPIRATORY:  No pleuritic chest pain.  GASTROINTESTINAL:  No abdominal pain.  GENITOURINARY:   No dysuria.  MUSCULOSKELETAL:  No arthralgia.  SKIN:  No rash or suspicious lesions.  NEUROLOGIC:   No headache.  ENDOCRINE:  No facial edema.  HEMATOLOGIC:  No abnormal bleeding.       See HPI for further details.   All other systems are negative.     PAST MEDICAL HISTORY  Past Medical History:   Diagnosis Date   • Alcoholism (HCC)    • Anxiety    • Arthritis    • ASTHMA    • Cancer (HCC) 1981    cervical   • Chronic low back pain    • Congestive heart failure (HCC)    • Depression    • EtOH dependence (HCC)    • Fall     alcohol related   • GERD (gastroesophageal reflux disease)    • HTN    • Hypertension    • Indigestion     GERD   • Muscle disorder    • OSTEOPOROSIS    • Other specified symptom associated with female genital organs     \"I have fibroids bad\"\"   • Psychiatric disorder    • PTSD (post-traumatic stress disorder)    • Renal disorder     Hx of stones   • Seizure (HCC)     several years ago r/t alcohol withdrawl   • Ulcer    • Vitamin D deficiency        FAMILY HISTORY  Family History   Problem Relation Age of Onset   • Heart Disease Father    • Hypertension Father    • Diabetes Father    • Cancer Paternal Grandmother    • Depression Other    • Lung Disease Neg Hx    • Stroke Neg Hx        SOCIAL HISTORY   reports that she has been smoking cigarettes. She has a 10.00 pack-year smoking history. She has never used smokeless tobacco. She reports current alcohol use. She reports that she does " not use drugs.    SURGICAL HISTORY  Past Surgical History:   Procedure Laterality Date   • GASTROSCOPY-ENDO N/A 10/3/2018    Procedure: GASTROSCOPY-ENDO;  Surgeon: Ben Negro M.D.;  Location: ENDOSCOPY [facility];  Service: Gastroenterology   • CYSTOSCOPY STENT PLACEMENT  11/9/2010    Performed by ANGEL HARRIS at [facility]   • URETEROSCOPY  11/9/2010    Performed by ANGEL HARRIS at [facility]   • LASERTRIPSY  11/9/2010    Performed by ANGEL HARRIS at [facility]   • STENT REMOVAL  11/9/2010    Performed by ANGEL HARRIS at [facility]   • CYSTO STENT PLACEMNT PRE SURG  10/7/2010    Performed by ANGEL HARRIS at [facility]   • HERNIA REPAIR  1977       CURRENT MEDICATIONS  Home Medications     Reviewed by Kandis Allen R.N. (Registered Nurse) on 05/27/20 at 0228  Med List Status: <None>   Medication Last Dose Status   acetaminophen (TYLENOL) 325 MG Tab  Active   albuterol 108 (90 Base) MCG/ACT Aero Soln inhalation aerosol  Active   doxepin (SINEQUAN) 10 MG Cap  Active   hydroCHLOROthiazide (HYDRODIURIL) 25 MG Tab  Active   lisinopril (PRINIVIL) 10 MG Tab  Active   loperamide (IMODIUM) 2 MG Cap  Active   Magnesium 400 MG Tab  Active   metoprolol (LOPRESSOR) 25 MG Tab  Active   potassium chloride SA (KDUR) 20 MEQ Tab CR  Active   QUEtiapine (SEROQUEL) 25 MG Tab  Active                ALLERGIES  Allergies   Allergen Reactions   • Sulfa Drugs Vomiting   • Toradol Vomiting       PHYSICAL EXAM  VITAL SIGNS: BP (!) 91/50   Pulse 83   Temp 35.9 °C (96.6 °F) (Temporal)   Resp 18   LMP 11/02/2015   SpO2 100%    Gen: Alert  HENT: ATNC  Eyes: Normal conjunctiva  Neck: trachea midline  Resp: no respiratory distress  CV: No JVD  Abd: non-distended  Ext: No deformities  Psych: normal mood  Neuro: speech fluent       RADIOLOGY/PROCEDURES  No orders to display       LABS  Labs Reviewed - No data to display     EKG  Results for orders placed  or performed during the hospital encounter of 19   EKG (NOW)   Result Value Ref Range    Report       [facility] Emergency Dept.    Test Date:  2019  Pt Name:    LAKSHMI DARLING              Department: ER  MRN:        [id number]                      Room:       [facility]  Gender:     Female                       Technician: [id number]  :        1960                   Requested By:SHORTY PENA  Order #:    [id number]                    Reading MD: SHORTY PENA MD    Measurements  Intervals                                Axis  Rate:       72                           P:          51  WV:         160                          QRS:        1  QRSD:       96                           T:          16  QT:         396  QTc:        434    Interpretive Statements  SINUS RHYTHM  EARLY PRECORDIAL R/S TRANSITION  Compared to ECG 2019 14:01:04  No significant changes    Electronically Signed On 2019 22:15:30 PDT by SHORTY PENA MD          COURSE & MEDICAL DECISION MAKING  Pertinent Labs & Imaging studies reviewed. (See chart for details)    ***    Appropriate PPE were worn at this encounter.     FINAL IMPRESSION  No diagnosis found.

## 2020-05-27 NOTE — ED NOTES
Pt sleeping in stretcher with even respirations. Pt appears to be in no acute distress. RN will continue to monitor pt.

## 2020-05-27 NOTE — ED PROVIDER NOTES
ED Provider Note    The patient sobered and was reassessed.  She was up and ambulatory.  Steady on her feet.  She denies any complaints.  No headache, chest pain, abdominal pain, fever, dysuria, rash or other extremity injury.  At this point she will be discharged.  She was given appropriate referrals for outpatient management of her alcohol addiction.  She will return to the ER for concerning medical symptoms.

## 2020-05-27 NOTE — ED NOTES
Pt states she has to urinate. D/t fall risk, purwick placed on pt with absorbent chucks. Pt resting comfortably on stretcher at this time.

## 2020-05-27 NOTE — ED TRIAGE NOTES
"Pt found on street acutely intoxicated. Pt states that she would like help with etoh detox. Pt states she drank \"a lot of vodka tonight\".   "

## 2020-05-27 NOTE — ED NOTES
Pt up ambulatory bathroom steady gait. A&ox4. Reviewed d/c paper work with pt. Pt assisted out of department. Given Bus pass from [place].

## 2020-05-28 ENCOUNTER — HOSPITAL ENCOUNTER (EMERGENCY)
Facility: MEDICAL CENTER | Age: 60
End: 2020-05-28
Payer: MEDICAID

## 2020-05-28 ENCOUNTER — HOSPITAL ENCOUNTER (EMERGENCY)
Facility: MEDICAL CENTER | Age: 60
End: 2020-05-28
Attending: EMERGENCY MEDICINE
Payer: MEDICAID

## 2020-05-28 VITALS
HEART RATE: 98 BPM | TEMPERATURE: 97 F | DIASTOLIC BLOOD PRESSURE: 64 MMHG | WEIGHT: 171.96 LBS | SYSTOLIC BLOOD PRESSURE: 109 MMHG | RESPIRATION RATE: 16 BRPM | BODY MASS INDEX: 29.52 KG/M2 | OXYGEN SATURATION: 93 %

## 2020-05-28 DIAGNOSIS — F10.920 ALCOHOLIC INTOXICATION WITHOUT COMPLICATION (HCC): ICD-10-CM

## 2020-05-28 DIAGNOSIS — D64.9 ANEMIA, UNSPECIFIED TYPE: ICD-10-CM

## 2020-05-28 LAB
ALBUMIN SERPL BCP-MCNC: 3.5 G/DL (ref 3.2–4.9)
ALBUMIN/GLOB SERPL: 1.1 G/DL
ALP SERPL-CCNC: 120 U/L (ref 30–99)
ALT SERPL-CCNC: 25 U/L (ref 2–50)
ANION GAP SERPL CALC-SCNC: 14 MMOL/L (ref 7–16)
ANISOCYTOSIS BLD QL SMEAR: ABNORMAL
AST SERPL-CCNC: 36 U/L (ref 12–45)
BASO STIPL BLD QL SMEAR: NORMAL
BASOPHILS # BLD AUTO: 1 % (ref 0–1.8)
BASOPHILS # BLD: 0.05 K/UL (ref 0–0.12)
BILIRUB SERPL-MCNC: <0.2 MG/DL (ref 0.1–1.5)
BUN SERPL-MCNC: 14 MG/DL (ref 8–22)
CALCIUM SERPL-MCNC: 9.1 MG/DL (ref 8.5–10.5)
CHLORIDE SERPL-SCNC: 103 MMOL/L (ref 96–112)
CO2 SERPL-SCNC: 24 MMOL/L (ref 20–33)
CREAT SERPL-MCNC: 0.62 MG/DL (ref 0.5–1.4)
EOSINOPHIL # BLD AUTO: 0.1 K/UL (ref 0–0.51)
EOSINOPHIL NFR BLD: 2 % (ref 0–6.9)
ERYTHROCYTE [DISTWIDTH] IN BLOOD BY AUTOMATED COUNT: 72.7 FL (ref 35.9–50)
ETHANOL BLD-MCNC: 335.7 MG/DL (ref 0–10.1)
GLOBULIN SER CALC-MCNC: 3.3 G/DL (ref 1.9–3.5)
GLUCOSE SERPL-MCNC: 81 MG/DL (ref 65–99)
HCT VFR BLD AUTO: 35.1 % (ref 37–47)
HGB BLD-MCNC: 11 G/DL (ref 12–16)
LIPASE SERPL-CCNC: 51 U/L (ref 11–82)
LYMPHOCYTES # BLD AUTO: 2.63 K/UL (ref 1–4.8)
LYMPHOCYTES NFR BLD: 50.5 % (ref 22–41)
MANUAL DIFF BLD: NORMAL
MCH RBC QN AUTO: 31.4 PG (ref 27–33)
MCHC RBC AUTO-ENTMCNC: 31.3 G/DL (ref 33.6–35)
MCV RBC AUTO: 100.3 FL (ref 81.4–97.8)
METAMYELOCYTES NFR BLD MANUAL: 1 %
MONOCYTES # BLD AUTO: 0.05 K/UL (ref 0–0.85)
MONOCYTES NFR BLD AUTO: 1 % (ref 0–13.4)
MORPHOLOGY BLD-IMP: NORMAL
NEUTROPHILS # BLD AUTO: 2.32 K/UL (ref 2–7.15)
NEUTROPHILS NFR BLD: 44.6 % (ref 44–72)
NRBC # BLD AUTO: 0 K/UL
NRBC BLD-RTO: 0 /100 WBC
OVALOCYTES BLD QL SMEAR: NORMAL
PLATELET # BLD AUTO: 456 K/UL (ref 164–446)
PLATELET BLD QL SMEAR: NORMAL
PMV BLD AUTO: 9.6 FL (ref 9–12.9)
POIKILOCYTOSIS BLD QL SMEAR: NORMAL
POLYCHROMASIA BLD QL SMEAR: NORMAL
POTASSIUM SERPL-SCNC: 4.3 MMOL/L (ref 3.6–5.5)
PROT SERPL-MCNC: 6.8 G/DL (ref 6–8.2)
RBC # BLD AUTO: 3.5 M/UL (ref 4.2–5.4)
RBC BLD AUTO: PRESENT
SODIUM SERPL-SCNC: 141 MMOL/L (ref 135–145)
TARGETS BLD QL SMEAR: NORMAL
WBC # BLD AUTO: 5.2 K/UL (ref 4.8–10.8)

## 2020-05-28 PROCEDURE — 85007 BL SMEAR W/DIFF WBC COUNT: CPT

## 2020-05-28 PROCEDURE — 83690 ASSAY OF LIPASE: CPT

## 2020-05-28 PROCEDURE — 80307 DRUG TEST PRSMV CHEM ANLYZR: CPT

## 2020-05-28 PROCEDURE — 85027 COMPLETE CBC AUTOMATED: CPT

## 2020-05-28 PROCEDURE — 99284 EMERGENCY DEPT VISIT MOD MDM: CPT

## 2020-05-28 PROCEDURE — 80053 COMPREHEN METABOLIC PANEL: CPT

## 2020-05-28 ASSESSMENT — FIBROSIS 4 INDEX: FIB4 SCORE: 3.53

## 2020-05-28 NOTE — ED NOTES
"Patient refused to wear mask in waiting area, instructed to wear mask, patient refused, patient exited emergency room front door, stated, \"I want to smoke,\" informed that smoking is not allowed on hospital property, stated, \"I don't want to be hear any more,\" [facility] security with staff at front door, escorted patient to bus stop, all belongings with patient when escorted.  "

## 2020-05-28 NOTE — ED NOTES
Pt was being non complaint with wearing mask. Pt was arguing with other pt in lobby, as well as throwing her personal belongings. Pt attempted to leave a few times. Pt was asked to stay and asked to stop yelling and to act accordingly for other pts. Pt walked out.

## 2020-05-28 NOTE — ED NOTES
Pt resting in bed. Pt awakens easily to voice, no complaints at this time, pt placed on 2L O2, call light within reach.

## 2020-05-28 NOTE — ED TRIAGE NOTES
Pt presents to the ED for alcohol intake. Pt last drink 5/27/20. Pt has slurred speech noted. Awake, alert and oriented x4 with steady gait

## 2020-05-28 NOTE — ED TRIAGE NOTES
Chief Complaint   Patient presents with   • Alcohol Intoxication     Slurred speech, seen 5/27/20 for same complaint

## 2020-05-28 NOTE — ED PROVIDER NOTES
ED Provider Note    Patient is signed out to me from Dr. Lovett at 6 AM after presenting to the emergency department at 4:30 AM for alcohol intoxication.  She had evaluation although no labs or work-up is complete at this time.    ER PROVIDER NOTE    CHIEF COMPLAINT  Chief Complaint   Patient presents with   • Alcohol Intoxication       HPI  Ashleigh Marquis is a 59 y.o. female who presents to the emergency department complaining of alcohol intoxication.  Patient reports that she drank too much, and is feeling sick.  She states she had some nausea although this is gone now.  She denies any other ingestion.  She denies any headaches or neck pain or recent trauma.  No fevers or chills or cough or recent illness.  No abdominal pain.  No chest pain or shortness of breath.    REVIEW OF SYSTEMS  Pertinent positives include nausea. Pertinent negatives include no fever or cough. See HPI for details. All other systems reviewed and are negative.    PAST MEDICAL HISTORY   has a past medical history of Alcoholism (HCC), Anxiety, Arthritis, ASTHMA, Cancer (HCC) (1981), Chronic low back pain, Congestive heart failure (HCC), Depression, EtOH dependence ([facility]), Fall, GERD (gastroesophageal reflux disease), HTN, Hypertension, Indigestion, Muscle disorder, OSTEOPOROSIS, Other specified symptom associated with female genital organs, Psychiatric disorder, PTSD (post-traumatic stress disorder), Renal disorder, Seizure (HCC), Ulcer, and Vitamin D deficiency.    SURGICAL HISTORY   has a past surgical history that includes hernia repair (1977); cysto stent placemnt pre surg (10/7/2010); cystoscopy stent placement (11/9/2010); ureteroscopy (11/9/2010); lasertripsy (11/9/2010); stent removal (11/9/2010); and gastroscopy-endo (N/A, 10/3/2018).    FAMILY HISTORY  Family History   Problem Relation Age of Onset   • Heart Disease Father    • Hypertension Father    • Diabetes Father    • Cancer Paternal Grandmother    • Depression Other    •  Lung Disease Neg Hx    • Stroke Neg Hx        SOCIAL HISTORY  Social History     Socioeconomic History   • Marital status:      Spouse name: Not on file   • Number of children: Not on file   • Years of education: Not on file   • Highest education level: Not on file   Occupational History   • Not on file   Social Needs   • Financial resource strain: Hard   • Food insecurity     Worry: Sometimes true     Inability: Sometimes true   • Transportation needs     Medical: Yes     Non-medical: Yes   Tobacco Use   • Smoking status: Current Every Day Smoker     Packs/day: 0.50     Years: 20.00     Pack years: 10.00     Types: Cigarettes   • Smokeless tobacco: Never Used   • Tobacco comment: 1/2 pack per day   Substance and Sexual Activity   • Alcohol use: Yes     Frequency: 4 or more times a week     Binge frequency: Daily or almost daily     Comment: vodka, heavy use   • Drug use: No   • Sexual activity: Never     Partners: Male   Lifestyle   • Physical activity     Days per week: Not on file     Minutes per session: Not on file   • Stress: Not on file   Relationships   • Social connections     Talks on phone: Not on file     Gets together: Not on file     Attends Buddhist service: Not on file     Active member of club or organization: Not on file     Attends meetings of clubs or organizations: Not on file     Relationship status: Not on file   • Intimate partner violence     Fear of current or ex partner: Not on file     Emotionally abused: Not on file     Physically abused: Not on file     Forced sexual activity: Not on file   Other Topics Concern   • Not on file   Social History Narrative    ** Merged History Encounter **           Social History     Substance and Sexual Activity   Drug Use No       CURRENT MEDICATIONS  Home Medications    **Home medications have not yet been reviewed for this encounter**         ALLERGIES  Allergies   Allergen Reactions   • Sulfa Drugs Vomiting   • Toradol Vomiting        PHYSICAL EXAM  VITAL SIGNS: /64   Pulse 98   Temp 36.1 °C (97 °F) (Temporal)   Resp 16   Wt 78 kg (171 lb 15.3 oz)   LMP 11/02/2015   SpO2 93%   BMI 29.52 kg/m²   Pulse ox interpretation: I interpret this pulse ox as normal.    Constitutional: Alert disheveled, unkempt  HENT: No signs of trauma, Bilateral external ears normal, Nose normal.   Eyes: Pupils are equal and reactive, Conjunctiva normal, Non-icteric.  Bilateral horizontal nystagmus  Neck: Normal range of motion, No tenderness, Supple, No stridor.   Lymphatic: No lymphadenopathy noted.   Cardiovascular: Regular rate and rhythm, no murmurs.   Thorax & Lungs: Normal breath sounds, No respiratory distress, No wheezing, No chest tenderness.   Abdomen: Bowel sounds normal, Soft, No tenderness, No masses, No pulsatile masses. No peritoneal signs.  Skin: Warm, Dry, No erythema, No rash.   Back: No bony tenderness, No CVA tenderness.   Extremities: Intact distal pulses, No edema, No tenderness, No cyanosis,  Musculoskeletal: Good range of motion in all major joints. No tenderness to palpation or major deformities noted.   Neurologic: Alert , slurred speech, ataxic gait, otherwise normal motor function, Normal sensory function, No focal deficits noted.   Psychiatric: Affect normal, Judgment normal, Mood normal.     DIAGNOSTIC STUDIES / PROCEDURES        LABS  Labs Reviewed   CBC WITH DIFFERENTIAL - Abnormal; Notable for the following components:       Result Value    RBC 3.50 (*)     Hemoglobin 11.0 (*)     Hematocrit 35.1 (*)     .3 (*)     MCHC 31.3 (*)     RDW 72.7 (*)     Platelet Count 456 (*)     Lymphocytes 50.50 (*)     All other components within normal limits   COMP METABOLIC PANEL - Abnormal; Notable for the following components:    Alkaline Phosphatase 120 (*)     All other components within normal limits   DIAGNOSTIC ALCOHOL - Abnormal; Notable for the following components:    Diagnostic Alcohol 335.7 (*)     All other  components within normal limits   LIPASE   ESTIMATED GFR   DIFFERENTIAL MANUAL   PERIPHERAL SMEAR REVIEW   PLATELET ESTIMATE   MORPHOLOGY       All labs reviewed by me.    RADIOLOGY  No orders to display     The radiologist's interpretation of all radiological studies have been reviewed by me.    COURSE & MEDICAL DECISION MAKING  Nursing notes, VS, PMSFHx reviewed in chart.    6:10 AM Patient seen and examined at bedside.  BC, CMP, alcohol level pending    8:49 AM  Patient reevaluated, updated on results, she is resting comfortably    10:23 AM  Patient reevaluated, she has a steady gait, clear sensorium, alert and oriented x3          Decision Makin y.o. y.o. female seen in ED for alcohol intoxication.  The patient was clinically intoxicated on hx and PE and had a etoh level of 335.  Patient is mildly anemic although this is baseline for her  There is no evidence of head trauma without any evidence of laceration, contusions, hematomas or fractures. Doubt significant intracranial bleed.  There is no evidence of SBI with stable vital signs and no pain of focal infx complaints.  The pt has a supple neck and no headache, therefore low suspicion for meningitis, encephalitis for the cause of this pts AMS.   Patient with unremarkable metabolic panel and no electrolyte abnormality or metabolic disturbance as cause for the pts AMS unlikely.   The pt sobered appropriately within the department and had a normal neurological examination. The pt is alert and oriented times 3 and has a normal and steady gate.   The pt was discharged from the department with stable vital signs after being counseled on alcohol sesation. Instructions to follow up with a PCP for any concerns were given. Strict return precautions were also given.     The patient will return for new or worsening symptoms and is stable at the time of discharge.    The patient is referred to a primary physician for blood pressure management, diabetic screening, and  for all other preventative health concerns.      DISPOSITION:  Patient will be discharged home in stable condition.    FOLLOW UP:  NICOLE AnthonyRYasminNYasmin  [address] [facility] [address]  [facility] [id number] [phone]    In 1 week        OUTPATIENT MEDICATIONS:  Discharge Medication List as of 5/28/2020 10:53 AM            FINAL IMPRESSION  1. Alcoholic intoxication without complication (HCC)    2. Anemia, unspecified type      The note accurately reflects work and decisions made by me.  Matt Joyce M.D.  5/28/2020  1:32 PM

## 2020-05-28 NOTE — ED NOTES
Attempted to draw blood from patient. Pt yelling at RN and pulling her arms away. Will attempt again

## 2020-05-28 NOTE — ED NOTES
RN attempted to located pt, pt last seen going towards bus stop. Pt left ama without signing paperwork.

## 2020-05-28 NOTE — ED NOTES
Pt resting in bed. Pt awakens easily to voice, no complaints at this time, call light within reach.

## 2020-05-28 NOTE — ED NOTES
Pt begins to yell at staff and swing at them when attempting to start an IV. She is informed that behavior is not okay, she does not demonstrate understanding.

## 2020-05-28 NOTE — ED PROVIDER NOTES
"ED Provider Note    CHIEF COMPLAINT  Chief Complaint   Patient presents with   • Alcohol Intoxication       HPI  Ashleigh Marquis is a 59 y.o. female who presents to the emerge department for recurrent alcohol intoxication.  History as documented below and reviewed with the patient.  Patient states that she is \"alcohol sick \".  Admits to excessive drinking today.  This is similar to prior.  Was able to get to the hospital via local bus transportation.  No other significant complaints.  Denies any abdominal pain.  Denies any trauma.  Denies SI or HI    REVIEW OF SYSTEMS  See HPI for further details. All other systems are negative.     PAST MEDICAL HISTORY   has a past medical history of Alcoholism (HCC), Anxiety, Arthritis, ASTHMA, Cancer (HCC) (1981), Chronic low back pain, Congestive heart failure (HCC), Depression, EtOH dependence (HCC), Fall, GERD (gastroesophageal reflux disease), HTN, Hypertension, Indigestion, Muscle disorder, OSTEOPOROSIS, Other specified symptom associated with female genital organs, Psychiatric disorder, PTSD (post-traumatic stress disorder), Renal disorder, Seizure (HCC), Ulcer, and Vitamin D deficiency.    SOCIAL HISTORY  Social History     Tobacco Use   • Smoking status: Current Every Day Smoker     Packs/day: 0.50     Years: 20.00     Pack years: 10.00     Types: Cigarettes   • Smokeless tobacco: Never Used   • Tobacco comment: 1/2 pack per day   Substance and Sexual Activity   • Alcohol use: Yes     Frequency: 4 or more times a week     Binge frequency: Daily or almost daily     Comment: vodka, heavy use   • Drug use: No   • Sexual activity: Never     Partners: Male       SURGICAL HISTORY   has a past surgical history that includes hernia repair (1977); cysto stent placemnt pre surg (10/7/2010); cystoscopy stent placement (11/9/2010); ureteroscopy (11/9/2010); lasertripsy (11/9/2010); stent removal (11/9/2010); and gastroscopy-endo (N/A, 10/3/2018).    CURRENT MEDICATIONS  Home " Medications    **Home medications have not yet been reviewed for this encounter**         ALLERGIES  Allergies   Allergen Reactions   • Sulfa Drugs Vomiting   • Toradol Vomiting       PHYSICAL EXAM  VITAL SIGNS: /74   Pulse 86   Temp 36.4 °C (97.6 °F) (Tympanic)   Resp 16   Wt 78 kg (171 lb 15.3 oz)   LMP 11/02/2015   SpO2 98%   BMI 29.52 kg/m²  @[email or web address][[id number]@   Pulse ox interpretation: I interpret this pulse ox as normal.  Constitutional: Alert in no apparent distress.  HENT: No signs of trauma, Bilateral external ears normal, Nose normal.   Eyes: Pupils are equal and reactive, Conjunctiva normal, Non-icteric.   Neck: Normal range of motion, No tenderness, Supple, No stridor.   Cardiovascular: Regular rate and rhythm, no murmurs.   Thorax & Lungs: Normal breath sounds, No respiratory distress, No wheezing, No chest tenderness.   Abdomen: Bowel sounds normal, Soft, No tenderness, No masses, No pulsatile masses. No peritoneal signs.  Skin: Warm, Dry, No erythema, No rash.   Back: No bony tenderness, No CVA tenderness.   Extremities: Intact distal pulses, No edema, No tenderness, No cyanosis,  Negative Raul's sign.   Musculoskeletal: Good range of motion in all major joints. No tenderness to palpation or major deformities noted.   Neurologic: Alert , slurred speech   Psychiatric: Intoxicated      DIAGNOSTIC STUDIES / PROCEDURES      LABS  Results for orders placed or performed during the hospital encounter of 04/24/20   CBC WITH DIFFERENTIAL   Result Value Ref Range    WBC 4.2 (L) 4.8 - 10.8 K/uL    RBC 3.85 (L) 4.20 - 5.40 M/uL    Hemoglobin 12.0 12.0 - 16.0 g/dL    Hematocrit 37.5 37.0 - 47.0 %    MCV 97.4 81.4 - 97.8 fL    MCH 31.2 27.0 - 33.0 pg    MCHC 32.0 (L) 33.6 - 35.0 g/dL    RDW 67.2 (H) 35.9 - 50.0 fL    Platelet Count 236 164 - 446 K/uL    MPV 9.6 9.0 - 12.9 fL    Neutrophils-Polys 35.80 (L) 44.00 - 72.00 %    Lymphocytes 42.50 (H) 22.00 - 41.00 %    Monocytes 15.80 (H) 0.00 - 13.40 %     Eosinophils 2.80 0.00 - 6.90 %    Basophils 2.40 (H) 0.00 - 1.80 %    Immature Granulocytes 0.70 0.00 - 0.90 %    Nucleated RBC 0.00 /100 WBC    Neutrophils (Absolute) 1.52 (L) 2.00 - 7.15 K/uL    Lymphs (Absolute) 1.80 1.00 - 4.80 K/uL    Monos (Absolute) 0.67 0.00 - 0.85 K/uL    Eos (Absolute) 0.12 0.00 - 0.51 K/uL    Baso (Absolute) 0.10 0.00 - 0.12 K/uL    Immature Granulocytes (abs) 0.03 0.00 - 0.11 K/uL    NRBC (Absolute) 0.00 K/uL    Hypochromia 1+     Anisocytosis 1+     Macrocytosis 1+     Microcytosis 1+    Comp Metabolic Panel   Result Value Ref Range    Sodium 142 135 - 145 mmol/L    Potassium 3.3 (L) 3.6 - 5.5 mmol/L    Chloride 101 96 - 112 mmol/L    Co2 23 20 - 33 mmol/L    Anion Gap 18.0 (H) 7.0 - 16.0    Glucose 87 65 - 99 mg/dL    Bun 9 8 - 22 mg/dL    Creatinine 0.57 0.50 - 1.40 mg/dL    Calcium 8.9 8.5 - 10.5 mg/dL    AST(SGOT) 89 (H) 12 - 45 U/L    ALT(SGPT) 37 2 - 50 U/L    Alkaline Phosphatase 130 (H) 30 - 99 U/L    Total Bilirubin 0.2 0.1 - 1.5 mg/dL    Albumin 3.6 3.2 - 4.9 g/dL    Total Protein 7.0 6.0 - 8.2 g/dL    Globulin 3.4 1.9 - 3.5 g/dL    A-G Ratio 1.1 g/dL   LIPASE   Result Value Ref Range    Lipase 56 11 - 82 U/L   PERIPHERAL SMEAR REVIEW   Result Value Ref Range    Peripheral Smear Review see below    PLATELET ESTIMATE   Result Value Ref Range    Plt Estimation Normal    MORPHOLOGY   Result Value Ref Range    RBC Morphology Present     Polychromia 1+    DIFFERENTIAL COMMENT   Result Value Ref Range    Comments-Diff see below    ESTIMATED GFR   Result Value Ref Range    GFR If African American >60 >60 mL/min/1.73 m 2    GFR If Non African American >60 >60 mL/min/1.73 m 2     (reviewed from yesterday)       0 500: Patient remains combative and noncompliance with intervention.  3 unsuccessful lab draw attempts.  For staff safety no further draws will be attempted.  Breathalyzer is now being obtained.    COURSE & MEDICAL DECISION MAKING  Pertinent Labs & Imaging studies reviewed.  (See chart for details)  Patient presented to the emergency department with recurrent alcohol intoxication.  Patient seen here yesterday.  Labs as noted above for from yesterday.  Patient not allowing for laboratory evaluation tonight.  No acute emergent complaints from the patient.  Will await further sobriety for reevaluation.  I signed out to my colleague which will continue ongoing care and final disposition as clinically appropriate.        FINAL IMPRESSION  Alcohol intoxication      Electronically signed by: Win Lovett M.D., 5/28/2020 4:27 AM

## 2020-06-09 ENCOUNTER — HOSPITAL ENCOUNTER (EMERGENCY)
Dept: HOSPITAL 8 - ED | Age: 60
Discharge: HOME | End: 2020-06-09
Payer: MEDICAID

## 2020-06-09 ENCOUNTER — HOSPITAL ENCOUNTER (EMERGENCY)
Facility: MEDICAL CENTER | Age: 60
End: 2020-06-09
Attending: EMERGENCY MEDICINE
Payer: MEDICAID

## 2020-06-09 VITALS
BODY MASS INDEX: 28.85 KG/M2 | OXYGEN SATURATION: 96 % | SYSTOLIC BLOOD PRESSURE: 148 MMHG | RESPIRATION RATE: 16 BRPM | HEIGHT: 64 IN | DIASTOLIC BLOOD PRESSURE: 80 MMHG | TEMPERATURE: 97.8 F | WEIGHT: 169 LBS | HEART RATE: 96 BPM

## 2020-06-09 VITALS — DIASTOLIC BLOOD PRESSURE: 81 MMHG | SYSTOLIC BLOOD PRESSURE: 132 MMHG

## 2020-06-09 VITALS — BODY MASS INDEX: 24.84 KG/M2 | HEIGHT: 64 IN | WEIGHT: 145.51 LBS

## 2020-06-09 DIAGNOSIS — F17.210: ICD-10-CM

## 2020-06-09 DIAGNOSIS — K21.9: ICD-10-CM
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
## 2020-06-09 DIAGNOSIS — N89.8 VAGINAL DISCHARGE: ICD-10-CM

## 2020-06-09 DIAGNOSIS — E87.6: ICD-10-CM

## 2020-06-09 DIAGNOSIS — F10.120: Primary | ICD-10-CM

## 2020-06-09 DIAGNOSIS — F10.920 ALCOHOLIC INTOXICATION WITHOUT COMPLICATION (HCC): ICD-10-CM

## 2020-06-09 DIAGNOSIS — A53.9 SYPHILIS: ICD-10-CM

## 2020-06-09 DIAGNOSIS — I10: ICD-10-CM

## 2020-06-09 DIAGNOSIS — E11.9: ICD-10-CM

## 2020-06-09 LAB
ANION GAP SERPL CALC-SCNC: 15 MMOL/L (ref 7–16)
ANISOCYTOSIS BLD QL SMEAR: ABNORMAL
APPEARANCE UR: CLEAR
BACTERIA #/AREA URNS HPF: NEGATIVE /HPF
BACTERIA GENITAL QL WET PREP: NORMAL
BASOPHILS # BLD AUTO: 1.8 % (ref 0–1.8)
BASOPHILS # BLD: 0.11 K/UL (ref 0–0.12)
BILIRUB UR QL STRIP.AUTO: NEGATIVE
BUN SERPL-MCNC: 12 MG/DL (ref 8–22)
CALCIUM SERPL-MCNC: 9.6 MG/DL (ref 8.5–10.5)
CHLORIDE SERPL-SCNC: 99 MMOL/L (ref 96–112)
CO2 SERPL-SCNC: 21 MMOL/L (ref 20–33)
COLOR UR: YELLOW
CREAT SERPL-MCNC: 0.64 MG/DL (ref 0.5–1.4)
EOSINOPHIL # BLD AUTO: 0.33 K/UL (ref 0–0.51)
EOSINOPHIL NFR BLD: 5.3 % (ref 0–6.9)
EPI CELLS #/AREA URNS HPF: ABNORMAL /HPF
ERYTHROCYTE [DISTWIDTH] IN BLOOD BY AUTOMATED COUNT: 71.8 FL (ref 35.9–50)
GIANT PLATELETS BLD QL SMEAR: NORMAL
GLUCOSE SERPL-MCNC: 81 MG/DL (ref 65–99)
GLUCOSE UR STRIP.AUTO-MCNC: NEGATIVE MG/DL
HCG UR QL: NEGATIVE
HCT VFR BLD AUTO: 37 % (ref 37–47)
HGB BLD-MCNC: 11.8 G/DL (ref 12–16)
HIV 1+2 AB+HIV1 P24 AG SERPL QL IA: NORMAL
HYALINE CASTS #/AREA URNS LPF: ABNORMAL /LPF
KETONES UR STRIP.AUTO-MCNC: NEGATIVE MG/DL
LEUKOCYTE ESTERASE UR QL STRIP.AUTO: ABNORMAL
LYMPHOCYTES # BLD AUTO: 2.21 K/UL (ref 1–4.8)
LYMPHOCYTES NFR BLD: 35.1 % (ref 22–41)
MACROCYTES BLD QL SMEAR: ABNORMAL
MANUAL DIFF BLD: NORMAL
MCH RBC QN AUTO: 32.4 PG (ref 27–33)
MCHC RBC AUTO-ENTMCNC: 31.9 G/DL (ref 33.6–35)
MCV RBC AUTO: 101.6 FL (ref 81.4–97.8)
MICRO URNS: ABNORMAL
MONOCYTES # BLD AUTO: 0.6 K/UL (ref 0–0.85)
MONOCYTES NFR BLD AUTO: 9.6 % (ref 0–13.4)
MORPHOLOGY BLD-IMP: NORMAL
NEUTROPHILS # BLD AUTO: 3.04 K/UL (ref 2–7.15)
NEUTROPHILS NFR BLD: 48.2 % (ref 44–72)
NITRITE UR QL STRIP.AUTO: NEGATIVE
NRBC # BLD AUTO: 0 K/UL
NRBC BLD-RTO: 0 /100 WBC
PH UR STRIP.AUTO: 5 [PH] (ref 5–8)
PLATELET # BLD AUTO: 245 K/UL (ref 164–446)
PLATELET BLD QL SMEAR: NORMAL
PMV BLD AUTO: 10.1 FL (ref 9–12.9)
POLYCHROMASIA BLD QL SMEAR: NORMAL
POTASSIUM SERPL-SCNC: 3.2 MMOL/L (ref 3.6–5.5)
PROT UR QL STRIP: NEGATIVE MG/DL
RBC # BLD AUTO: 3.64 M/UL (ref 4.2–5.4)
RBC # URNS HPF: ABNORMAL /HPF
RBC BLD AUTO: PRESENT
RBC UR QL AUTO: NEGATIVE
RPR SER QL: REACTIVE
RPR SER-TITR: NORMAL {TITER}
SIGNIFICANT IND 70042: NORMAL
SITE SITE: NORMAL
SODIUM SERPL-SCNC: 135 MMOL/L (ref 135–145)
SOURCE SOURCE: NORMAL
SP GR UR STRIP.AUTO: 1.01
TREPONEMA PALLIDUM IGG+IGM AB [PRESENCE] IN SERUM OR PLASMA BY IMMUNOASSAY: REACTIVE
UROBILINOGEN UR STRIP.AUTO-MCNC: 0.2 MG/DL
VARIANT LYMPHS BLD QL SMEAR: NORMAL
WBC # BLD AUTO: 6.3 K/UL (ref 4.8–10.8)
WBC #/AREA URNS HPF: ABNORMAL /HPF

## 2020-06-09 PROCEDURE — 85027 COMPLETE CBC AUTOMATED: CPT

## 2020-06-09 PROCEDURE — 99284 EMERGENCY DEPT VISIT MOD MDM: CPT

## 2020-06-09 PROCEDURE — 700111 HCHG RX REV CODE 636 W/ 250 OVERRIDE (IP): Performed by: EMERGENCY MEDICINE

## 2020-06-09 PROCEDURE — 87389 HIV-1 AG W/HIV-1&-2 AB AG IA: CPT

## 2020-06-09 PROCEDURE — 87591 N.GONORRHOEAE DNA AMP PROB: CPT

## 2020-06-09 PROCEDURE — 96372 THER/PROPH/DIAG INJ SC/IM: CPT

## 2020-06-09 PROCEDURE — 700102 HCHG RX REV CODE 250 W/ 637 OVERRIDE(OP): Performed by: EMERGENCY MEDICINE

## 2020-06-09 PROCEDURE — 99406 BEHAV CHNG SMOKING 3-10 MIN: CPT

## 2020-06-09 PROCEDURE — A9270 NON-COVERED ITEM OR SERVICE: HCPCS | Performed by: EMERGENCY MEDICINE

## 2020-06-09 PROCEDURE — 87491 CHLMYD TRACH DNA AMP PROBE: CPT

## 2020-06-09 PROCEDURE — 81025 URINE PREGNANCY TEST: CPT

## 2020-06-09 PROCEDURE — 81001 URINALYSIS AUTO W/SCOPE: CPT

## 2020-06-09 PROCEDURE — 87086 URINE CULTURE/COLONY COUNT: CPT

## 2020-06-09 PROCEDURE — 87077 CULTURE AEROBIC IDENTIFY: CPT

## 2020-06-09 PROCEDURE — 80048 BASIC METABOLIC PNL TOTAL CA: CPT

## 2020-06-09 PROCEDURE — 85007 BL SMEAR W/DIFF WBC COUNT: CPT

## 2020-06-09 PROCEDURE — 86592 SYPHILIS TEST NON-TREP QUAL: CPT

## 2020-06-09 PROCEDURE — 86593 SYPHILIS TEST NON-TREP QUANT: CPT

## 2020-06-09 PROCEDURE — 86780 TREPONEMA PALLIDUM: CPT

## 2020-06-09 PROCEDURE — 99283 EMERGENCY DEPT VISIT LOW MDM: CPT

## 2020-06-09 RX ORDER — ACETAMINOPHEN 325 MG/1
650 TABLET ORAL ONCE
Status: COMPLETED | OUTPATIENT
Start: 2020-06-09 | End: 2020-06-09

## 2020-06-09 RX ORDER — CEFTRIAXONE SODIUM 250 MG/1
250 INJECTION, POWDER, FOR SOLUTION INTRAMUSCULAR; INTRAVENOUS ONCE
Status: COMPLETED | OUTPATIENT
Start: 2020-06-09 | End: 2020-06-09

## 2020-06-09 RX ORDER — AZITHROMYCIN 250 MG/1
1000 TABLET, FILM COATED ORAL ONCE
Status: COMPLETED | OUTPATIENT
Start: 2020-06-09 | End: 2020-06-09

## 2020-06-09 RX ORDER — LIDOCAINE HYDROCHLORIDE ANHYDROUS AND DEXTROSE MONOHYDRATE 5; 400 G/100ML; MG/100ML
INJECTION, SOLUTION INTRAVENOUS
Status: COMPLETED
Start: 2020-06-09 | End: 2020-06-09

## 2020-06-09 RX ADMIN — AZITHROMYCIN MONOHYDRATE 1000 MG: 250 TABLET ORAL at 02:51

## 2020-06-09 RX ADMIN — ACETAMINOPHEN 650 MG: 325 TABLET, FILM COATED ORAL at 06:00

## 2020-06-09 RX ADMIN — PENICILLIN G BENZATHINE 2.4 MILLION UNITS: 1200000 INJECTION, SUSPENSION INTRAMUSCULAR at 03:32

## 2020-06-09 RX ADMIN — CEFTRIAXONE SODIUM 250 MG: 250 INJECTION, POWDER, FOR SOLUTION INTRAMUSCULAR; INTRAVENOUS at 02:52

## 2020-06-09 ASSESSMENT — LIFESTYLE VARIABLES
CONSUMPTION TOTAL: INCOMPLETE
HAVE YOU EVER FELT YOU SHOULD CUT DOWN ON YOUR DRINKING: NO
EVER FELT BAD OR GUILTY ABOUT YOUR DRINKING: NO
HAVE PEOPLE ANNOYED YOU BY CRITICIZING YOUR DRINKING: NO
TOTAL SCORE: 0
DO YOU DRINK ALCOHOL: NO
EVER HAD A DRINK FIRST THING IN THE MORNING TO STEADY YOUR NERVES TO GET RID OF A HANGOVER: NO

## 2020-06-09 ASSESSMENT — ENCOUNTER SYMPTOMS
NAUSEA: 0
FEVER: 0
SHORTNESS OF BREATH: 0
ABDOMINAL PAIN: 0
VOMITING: 0

## 2020-06-09 ASSESSMENT — FIBROSIS 4 INDEX: FIB4 SCORE: .9
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
## 2020-06-09 NOTE — ED NOTES
Discharge instructions and follow up care discussed with patient. Patient given time to ask questions and verbalized understanding. Boxed lunch provided. Pt to rr with steady gait.
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
## 2020-06-09 NOTE — ED NOTES
Blood drawn from Left FA and sent to lab. This RN assisted DR. Flores with Pelvic Exam, swabbed collected and sent to [facility]. Patient tolerated well.
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
## 2020-06-11 ENCOUNTER — HOSPITAL ENCOUNTER (EMERGENCY)
Facility: MEDICAL CENTER | Age: 60
End: 2020-06-12
Attending: EMERGENCY MEDICINE
Payer: MEDICAID

## 2020-06-11 DIAGNOSIS — F10.920 ALCOHOLIC INTOXICATION WITHOUT COMPLICATION (HCC): ICD-10-CM

## 2020-06-11 LAB
BACTERIA UR CULT: ABNORMAL
BACTERIA UR CULT: ABNORMAL
SIGNIFICANT IND 70042: ABNORMAL
SITE SITE: ABNORMAL
SOURCE SOURCE: ABNORMAL

## 2020-06-11 PROCEDURE — 99284 EMERGENCY DEPT VISIT MOD MDM: CPT

## 2020-06-11 ASSESSMENT — FIBROSIS 4 INDEX: FIB4 SCORE: 1.68

## 2020-06-12 ENCOUNTER — HOSPITAL ENCOUNTER (EMERGENCY)
Dept: HOSPITAL 8 - ED | Age: 60
End: 2020-06-12
Payer: MEDICAID

## 2020-06-12 ENCOUNTER — HOSPITAL ENCOUNTER (EMERGENCY)
Facility: MEDICAL CENTER | Age: 60
End: 2020-06-13
Attending: EMERGENCY MEDICINE
Payer: MEDICAID

## 2020-06-12 VITALS
HEIGHT: 64 IN | TEMPERATURE: 97.2 F | BODY MASS INDEX: 27.31 KG/M2 | OXYGEN SATURATION: 94 % | WEIGHT: 160 LBS | HEART RATE: 95 BPM | RESPIRATION RATE: 16 BRPM | DIASTOLIC BLOOD PRESSURE: 70 MMHG | SYSTOLIC BLOOD PRESSURE: 121 MMHG

## 2020-06-12 VITALS — BODY MASS INDEX: 2.82 KG/M2 | WEIGHT: 16.53 LBS | HEIGHT: 64 IN

## 2020-06-12 VITALS — SYSTOLIC BLOOD PRESSURE: 116 MMHG | DIASTOLIC BLOOD PRESSURE: 63 MMHG

## 2020-06-12 DIAGNOSIS — F10.920 ALCOHOLIC INTOXICATION WITHOUT COMPLICATION (HCC): ICD-10-CM

## 2020-06-12 DIAGNOSIS — F10.229: Primary | ICD-10-CM

## 2020-06-12 DIAGNOSIS — Z72.9: ICD-10-CM
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
## 2020-06-12 LAB
APPEARANCE UR: CLEAR
BACTERIA #/AREA URNS HPF: NEGATIVE /HPF
BILIRUB UR QL STRIP.AUTO: NEGATIVE
COLOR UR: YELLOW
EPI CELLS #/AREA URNS HPF: ABNORMAL /HPF
GLUCOSE UR STRIP.AUTO-MCNC: NEGATIVE MG/DL
HYALINE CASTS #/AREA URNS LPF: ABNORMAL /LPF
KETONES UR STRIP.AUTO-MCNC: NEGATIVE MG/DL
LEUKOCYTE ESTERASE UR QL STRIP.AUTO: ABNORMAL
MICRO URNS: ABNORMAL
NITRITE UR QL STRIP.AUTO: NEGATIVE
PH UR STRIP.AUTO: 5 [PH] (ref 5–8)
POC BREATHALIZER: 0.23 PERCENT (ref 0–0.01)
PROT UR QL STRIP: NEGATIVE MG/DL
RBC # URNS HPF: ABNORMAL /HPF
RBC UR QL AUTO: NEGATIVE
SP GR UR STRIP.AUTO: 1.01
UROBILINOGEN UR STRIP.AUTO-MCNC: 0.2 MG/DL
WBC #/AREA URNS HPF: ABNORMAL /HPF

## 2020-06-12 PROCEDURE — 81001 URINALYSIS AUTO W/SCOPE: CPT

## 2020-06-12 PROCEDURE — 302970 POC BREATHALIZER: Performed by: EMERGENCY MEDICINE

## 2020-06-12 PROCEDURE — 99284 EMERGENCY DEPT VISIT MOD MDM: CPT

## 2020-06-12 PROCEDURE — 99283 EMERGENCY DEPT VISIT LOW MDM: CPT

## 2020-06-12 ASSESSMENT — LIFESTYLE VARIABLES
TOTAL SCORE: 0
HAVE PEOPLE ANNOYED YOU BY CRITICIZING YOUR DRINKING: NO
EVER HAD A DRINK FIRST THING IN THE MORNING TO STEADY YOUR NERVES TO GET RID OF A HANGOVER: NO
CONSUMPTION TOTAL: INCOMPLETE
TOTAL SCORE: 0
HAVE YOU EVER FELT YOU SHOULD CUT DOWN ON YOUR DRINKING: NO
DO YOU DRINK ALCOHOL: YES
DOES PATIENT WANT TO STOP DRINKING: NO
TOTAL SCORE: 0
EVER FELT BAD OR GUILTY ABOUT YOUR DRINKING: NO

## 2020-06-12 ASSESSMENT — FIBROSIS 4 INDEX: FIB4 SCORE: 1.68

## 2020-06-12 NOTE — ED TRIAGE NOTES
Pt arrives via ems after being called for intoxication. Pt was at [facility] trying to be seen for alcohol detox when [facility] deemed pt too drunk to be admitted. Pt sent to ER for evaluation.
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
## 2020-06-12 NOTE — ED NOTES
Pt aox4, skin pink warm and dry, airway patent, rr even and unlabored, ambulatory with steady gait, states she is ready to go home. Pt clinically sober, ambulates upon discharge without new incident or distress.

## 2020-06-13 ENCOUNTER — HOSPITAL ENCOUNTER (EMERGENCY)
Dept: HOSPITAL 8 - ED | Age: 60
Discharge: HOME | End: 2020-06-13
Payer: MEDICAID

## 2020-06-13 ENCOUNTER — HOSPITAL ENCOUNTER (EMERGENCY)
Dept: HOSPITAL 8 - ED | Age: 60
Discharge: LEFT BEFORE DISCHARGE | End: 2020-06-13
Payer: MEDICAID

## 2020-06-13 VITALS
BODY MASS INDEX: 27.31 KG/M2 | SYSTOLIC BLOOD PRESSURE: 109 MMHG | RESPIRATION RATE: 14 BRPM | HEIGHT: 64 IN | OXYGEN SATURATION: 97 % | HEART RATE: 86 BPM | DIASTOLIC BLOOD PRESSURE: 69 MMHG | WEIGHT: 160 LBS | TEMPERATURE: 96.7 F

## 2020-06-13 VITALS — HEIGHT: 64 IN | WEIGHT: 169.76 LBS | BODY MASS INDEX: 28.98 KG/M2

## 2020-06-13 VITALS — SYSTOLIC BLOOD PRESSURE: 144 MMHG | DIASTOLIC BLOOD PRESSURE: 96 MMHG

## 2020-06-13 VITALS — DIASTOLIC BLOOD PRESSURE: 69 MMHG | SYSTOLIC BLOOD PRESSURE: 125 MMHG

## 2020-06-13 DIAGNOSIS — Z53.21: Primary | ICD-10-CM

## 2020-06-13 DIAGNOSIS — R11.2: ICD-10-CM

## 2020-06-13 DIAGNOSIS — I10: ICD-10-CM

## 2020-06-13 DIAGNOSIS — E11.9: ICD-10-CM
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
## 2020-06-13 DIAGNOSIS — F10.120: Primary | ICD-10-CM

## 2020-06-13 DIAGNOSIS — K21.9: ICD-10-CM

## 2020-06-13 LAB
ALBUMIN SERPL-MCNC: 3.6 G/DL (ref 3.4–5)
ALP SERPL-CCNC: 155 U/L (ref 45–117)
ALT SERPL-CCNC: 31 U/L (ref 12–78)
ANION GAP SERPL CALC-SCNC: 10 MMOL/L (ref 5–15)
BASOPHILS # BLD AUTO: 0.04 X10^3/UL (ref 0–0.1)
BASOPHILS NFR BLD AUTO: 1 % (ref 0–1)
BILIRUB SERPL-MCNC: 0.1 MG/DL (ref 0.2–1)
CALCIUM SERPL-MCNC: 9.3 MG/DL (ref 8.5–10.1)
CHLORIDE SERPL-SCNC: 109 MMOL/L (ref 98–107)
CREAT SERPL-MCNC: 0.83 MG/DL (ref 0.55–1.02)
EOSINOPHIL # BLD AUTO: 0.02 X10^3/UL (ref 0–0.4)
EOSINOPHIL NFR BLD AUTO: 0 % (ref 1–7)
ERYTHROCYTE [DISTWIDTH] IN BLOOD BY AUTOMATED COUNT: 21.5 % (ref 9.6–15.2)
LYMPHOCYTES # BLD AUTO: 2.06 X10^3/UL (ref 1–3.4)
LYMPHOCYTES NFR BLD AUTO: 28 % (ref 22–44)
MCH RBC QN AUTO: 32.4 PG (ref 27–34.8)
MCHC RBC AUTO-ENTMCNC: 33.3 G/DL (ref 32.4–35.8)
MCV RBC AUTO: 97.5 FL (ref 80–100)
MD: NO
MONOCYTES # BLD AUTO: 0.42 X10^3/UL (ref 0.2–0.8)
MONOCYTES NFR BLD AUTO: 6 % (ref 2–9)
NEUTROPHILS # BLD AUTO: 4.74 X10^3/UL (ref 1.8–6.8)
NEUTROPHILS NFR BLD AUTO: 65 % (ref 42–75)
PLATELET # BLD AUTO: 314 X10^3/UL (ref 130–400)
PMV BLD AUTO: 7.5 FL (ref 7.4–10.4)
PROT SERPL-MCNC: 8.4 G/DL (ref 6.4–8.2)
RBC # BLD AUTO: 4 X10^6/UL (ref 3.82–5.3)

## 2020-06-13 PROCEDURE — 80053 COMPREHEN METABOLIC PANEL: CPT

## 2020-06-13 PROCEDURE — 36415 COLL VENOUS BLD VENIPUNCTURE: CPT

## 2020-06-13 PROCEDURE — 80307 DRUG TEST PRSMV CHEM ANLYZR: CPT

## 2020-06-13 PROCEDURE — 85025 COMPLETE CBC W/AUTO DIFF WBC: CPT

## 2020-06-13 PROCEDURE — 83690 ASSAY OF LIPASE: CPT

## 2020-06-13 PROCEDURE — 99283 EMERGENCY DEPT VISIT LOW MDM: CPT
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
## 2020-06-13 NOTE — ED TRIAGE NOTES
Chief Complaint   Patient presents with   • Alcohol Intoxication     Arrived via [facility], slurred speech, tearful, denies pain

## 2020-06-13 NOTE — ED NOTES
Patient resting in bed, sleeping, no signs of pain or distress, unlabored breathing noted, attached to cardiac monitor, bed in low position, call light within reach, repositions self occasionally.

## 2020-06-14 ENCOUNTER — HOSPITAL ENCOUNTER (EMERGENCY)
Dept: HOSPITAL 8 - ED | Age: 60
Discharge: HOME | End: 2020-06-14
Payer: MEDICAID

## 2020-06-14 ENCOUNTER — HOSPITAL ENCOUNTER (EMERGENCY)
Facility: MEDICAL CENTER | Age: 60
End: 2020-06-14
Attending: EMERGENCY MEDICINE
Payer: MEDICAID

## 2020-06-14 VITALS
DIASTOLIC BLOOD PRESSURE: 79 MMHG | BODY MASS INDEX: 27.46 KG/M2 | WEIGHT: 160 LBS | RESPIRATION RATE: 16 BRPM | HEART RATE: 76 BPM | OXYGEN SATURATION: 95 % | TEMPERATURE: 97.5 F | SYSTOLIC BLOOD PRESSURE: 138 MMHG

## 2020-06-14 VITALS — DIASTOLIC BLOOD PRESSURE: 65 MMHG | SYSTOLIC BLOOD PRESSURE: 92 MMHG

## 2020-06-14 VITALS — WEIGHT: 132.28 LBS | BODY MASS INDEX: 24.34 KG/M2 | HEIGHT: 62 IN

## 2020-06-14 DIAGNOSIS — F10.920 ALCOHOLIC INTOXICATION WITHOUT COMPLICATION (HCC): ICD-10-CM
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
## 2020-06-14 DIAGNOSIS — E11.9: ICD-10-CM

## 2020-06-14 DIAGNOSIS — F10.220: Primary | ICD-10-CM

## 2020-06-14 DIAGNOSIS — I10: ICD-10-CM

## 2020-06-14 DIAGNOSIS — K21.9: ICD-10-CM

## 2020-06-14 PROCEDURE — 99283 EMERGENCY DEPT VISIT LOW MDM: CPT

## 2020-06-14 PROCEDURE — 99285 EMERGENCY DEPT VISIT HI MDM: CPT

## 2020-06-14 ASSESSMENT — FIBROSIS 4 INDEX: FIB4 SCORE: 1.68

## 2020-06-14 ASSESSMENT — LIFESTYLE VARIABLES: DO YOU DRINK ALCOHOL: YES
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
## 2020-06-14 NOTE — ED NOTES
Pt continuously getting up and oob. Pt remains intoxicated and fall risk pt is redirectable to room.
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
## 2020-06-15 ENCOUNTER — HOSPITAL ENCOUNTER (EMERGENCY)
Dept: HOSPITAL 8 - ED | Age: 60
Discharge: HOME | End: 2020-06-15
Payer: MEDICAID

## 2020-06-15 VITALS — DIASTOLIC BLOOD PRESSURE: 83 MMHG | SYSTOLIC BLOOD PRESSURE: 137 MMHG

## 2020-06-15 VITALS — BODY MASS INDEX: 29.38 KG/M2 | HEIGHT: 65 IN | WEIGHT: 176.37 LBS

## 2020-06-15 VITALS — DIASTOLIC BLOOD PRESSURE: 85 MMHG | SYSTOLIC BLOOD PRESSURE: 132 MMHG

## 2020-06-15 VITALS — BODY MASS INDEX: 27.48 KG/M2 | WEIGHT: 160.94 LBS | HEIGHT: 64 IN

## 2020-06-15 DIAGNOSIS — F10.220: Primary | ICD-10-CM
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
## 2020-06-15 DIAGNOSIS — K21.9: ICD-10-CM

## 2020-06-15 DIAGNOSIS — R94.31: ICD-10-CM

## 2020-06-15 DIAGNOSIS — I10: ICD-10-CM

## 2020-06-15 PROCEDURE — 93005 ELECTROCARDIOGRAM TRACING: CPT

## 2020-06-15 PROCEDURE — 99283 EMERGENCY DEPT VISIT LOW MDM: CPT

## 2020-06-15 NOTE — ED NOTES
Pt resting intermittently no longer getting up and ambulating out to [place]. Given meal box and juice.

## 2020-06-15 NOTE — ED NOTES
Discharged to self pt was given bus pass. She will follow up with [facility]. Pt recommended to avoid drinking. Belongings returned to her.

## 2020-06-15 NOTE — ED PROVIDER NOTES
ED PROVIDER NOTE    Scribed for Gibson Andrade M.D. by Ayesha King. 6/14/2020, 6:34 PM.    This is an addendum to the note on Ashleigh Marquis. For further details and full chart entry, see the previously signed ED Provider Note written by Dr. Chatman (ERP).      6:10 PM - I discussed the patient's case with Dr. Chatman (ERP) who will transfer care of the patient to me at this time.    6:35 PM - Patient was reevaluated at bedside. She is coherent and ambulating without difficulties at this time. The patient is clinically sober and wishes to be discharged home.     The patient will return for new or worsening symptoms and is stable at the time of discharge.    DISPOSITION:  Patient will be discharged home in stable condition.    FOLLOW UP:  CACHORRO Anthony  [address] [facility] [address]  [place] [facility] [id number] [phone]          [facility], Emergency Dept  [facility] [facility] [id number] [phone]    Return for worsening symptoms or other concerns      FINAL IMPRESSION   1. Alcoholic intoxication without complication (HCC)         Ayesha GAN (Scribe), am scribing for, and in the presence of, MOLLY Fernandez.    Electronically signed by: Ayesha King (Colemanibroger), 6/14/2020    Gibson GAN M.D. personally performed the services described in this documentation, as scribed by Ayesha King in my presence, and it is both accurate and complete.    The note accurately reflects work and decisions made by me.  Gibson Andrade M.D.  6/15/2020  12:53 AM

## 2020-07-04 ENCOUNTER — HOSPITAL ENCOUNTER (EMERGENCY)
Facility: MEDICAL CENTER | Age: 60
End: 2020-07-04
Attending: EMERGENCY MEDICINE
Payer: MEDICAID

## 2020-07-04 ENCOUNTER — HOSPITAL ENCOUNTER (EMERGENCY)
Dept: HOSPITAL 8 - ED | Age: 60
LOS: 1 days | Discharge: HOME | End: 2020-07-05
Payer: MEDICAID

## 2020-07-04 VITALS — BODY MASS INDEX: 26.22 KG/M2 | HEIGHT: 66 IN | WEIGHT: 163.14 LBS

## 2020-07-04 VITALS
TEMPERATURE: 99.7 F | SYSTOLIC BLOOD PRESSURE: 139 MMHG | HEART RATE: 109 BPM | DIASTOLIC BLOOD PRESSURE: 73 MMHG | WEIGHT: 160 LBS | HEIGHT: 64 IN | OXYGEN SATURATION: 97 % | RESPIRATION RATE: 18 BRPM | BODY MASS INDEX: 27.31 KG/M2

## 2020-07-04 DIAGNOSIS — F10.10 ALCOHOL ABUSE: ICD-10-CM

## 2020-07-04 DIAGNOSIS — E11.9: ICD-10-CM

## 2020-07-04 DIAGNOSIS — I10: ICD-10-CM

## 2020-07-04 DIAGNOSIS — R00.0: ICD-10-CM

## 2020-07-04 DIAGNOSIS — Z76.0: ICD-10-CM

## 2020-07-04 DIAGNOSIS — K21.9: ICD-10-CM
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
## 2020-07-04 DIAGNOSIS — F10.220: Primary | ICD-10-CM

## 2020-07-04 PROCEDURE — 99283 EMERGENCY DEPT VISIT LOW MDM: CPT

## 2020-07-04 PROCEDURE — 99284 EMERGENCY DEPT VISIT MOD MDM: CPT

## 2020-07-04 ASSESSMENT — FIBROSIS 4 INDEX: FIB4 SCORE: 1.68
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
## 2020-07-04 NOTE — ED TRIAGE NOTES
[place] was [place] for pt because she was sleeping.     Pt reportedly drank one pint.     Pt ambulatory with steady gait. NAD noted.

## 2020-07-05 ENCOUNTER — HOSPITAL ENCOUNTER (EMERGENCY)
Dept: HOSPITAL 8 - ED | Age: 60
Discharge: HOME | End: 2020-07-05
Payer: MEDICAID

## 2020-07-05 VITALS — WEIGHT: 163.14 LBS | HEIGHT: 63 IN | BODY MASS INDEX: 28.91 KG/M2

## 2020-07-05 VITALS — DIASTOLIC BLOOD PRESSURE: 82 MMHG | SYSTOLIC BLOOD PRESSURE: 146 MMHG

## 2020-07-05 VITALS — HEIGHT: 65 IN | BODY MASS INDEX: 28.47 KG/M2 | WEIGHT: 170.86 LBS

## 2020-07-05 VITALS — SYSTOLIC BLOOD PRESSURE: 109 MMHG | DIASTOLIC BLOOD PRESSURE: 60 MMHG

## 2020-07-05 VITALS — DIASTOLIC BLOOD PRESSURE: 79 MMHG | SYSTOLIC BLOOD PRESSURE: 146 MMHG
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
## 2020-07-05 DIAGNOSIS — F10.220: Primary | ICD-10-CM

## 2020-07-05 DIAGNOSIS — Z72.9: ICD-10-CM

## 2020-07-05 DIAGNOSIS — K21.9: ICD-10-CM

## 2020-07-05 DIAGNOSIS — E11.9: ICD-10-CM
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
## 2020-07-05 DIAGNOSIS — I10: ICD-10-CM

## 2020-07-05 DIAGNOSIS — F17.200: ICD-10-CM

## 2020-07-05 DIAGNOSIS — F10.120: Primary | ICD-10-CM

## 2020-07-05 PROCEDURE — 99283 EMERGENCY DEPT VISIT LOW MDM: CPT

## 2020-07-05 NOTE — ED NOTES
Pt sleeping. nad noted. Visible chest rise and fall present. Will continue to offer PO hydration to pt

## 2020-07-05 NOTE — ED NOTES
Pt discharged home. Explained discharge instructions. Questions and concerns addressed. Pt verbalized understanding of instructions. Wristband removed. Pt advised to follow-up with pcp or return to ED for any new or worsening of symptoms. Pt is ambulating well and steady on feet. VS stable. Pt ambulated w/o assistance to lobby with proper safety awareness with this rn. D/c in stable condition.        Pt verbalized understanding of medication instructions.

## 2020-07-07 ENCOUNTER — HOSPITAL ENCOUNTER (EMERGENCY)
Dept: HOSPITAL 8 - ED | Age: 60
Discharge: HOME | End: 2020-07-07
Payer: MEDICAID

## 2020-07-07 ENCOUNTER — HOSPITAL ENCOUNTER (EMERGENCY)
Facility: MEDICAL CENTER | Age: 60
End: 2020-07-08
Attending: EMERGENCY MEDICINE
Payer: MEDICAID

## 2020-07-07 VITALS — BODY MASS INDEX: 24.34 KG/M2 | WEIGHT: 132.28 LBS | HEIGHT: 62 IN

## 2020-07-07 VITALS — SYSTOLIC BLOOD PRESSURE: 108 MMHG | DIASTOLIC BLOOD PRESSURE: 46 MMHG
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
## 2020-07-07 DIAGNOSIS — E11.9: ICD-10-CM
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
## 2020-07-07 DIAGNOSIS — F17.200: ICD-10-CM

## 2020-07-07 DIAGNOSIS — F10.220: ICD-10-CM
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
## 2020-07-07 DIAGNOSIS — S70.01XA: ICD-10-CM

## 2020-07-07 DIAGNOSIS — S70.02XA: Primary | ICD-10-CM

## 2020-07-07 DIAGNOSIS — K21.9: ICD-10-CM
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
## 2020-07-07 DIAGNOSIS — J34.89: ICD-10-CM

## 2020-07-07 DIAGNOSIS — M54.2: ICD-10-CM

## 2020-07-07 DIAGNOSIS — M79.601 ARM PAIN, ANTERIOR, RIGHT: ICD-10-CM

## 2020-07-07 DIAGNOSIS — I10: ICD-10-CM

## 2020-07-07 DIAGNOSIS — F10.920 ALCOHOLIC INTOXICATION WITHOUT COMPLICATION (HCC): ICD-10-CM

## 2020-07-07 PROCEDURE — A9270 NON-COVERED ITEM OR SERVICE: HCPCS | Performed by: EMERGENCY MEDICINE

## 2020-07-07 PROCEDURE — 70450 CT HEAD/BRAIN W/O DYE: CPT

## 2020-07-07 PROCEDURE — 700102 HCHG RX REV CODE 250 W/ 637 OVERRIDE(OP): Performed by: EMERGENCY MEDICINE

## 2020-07-07 PROCEDURE — 72125 CT NECK SPINE W/O DYE: CPT

## 2020-07-07 PROCEDURE — 99285 EMERGENCY DEPT VISIT HI MDM: CPT

## 2020-07-07 PROCEDURE — 72190 X-RAY EXAM OF PELVIS: CPT

## 2020-07-07 RX ORDER — HYDROCHLOROTHIAZIDE 25 MG/1
25 TABLET ORAL ONCE
Status: COMPLETED | OUTPATIENT
Start: 2020-07-07 | End: 2020-07-07

## 2020-07-07 RX ORDER — IBUPROFEN 600 MG/1
600 TABLET ORAL ONCE
Status: COMPLETED | OUTPATIENT
Start: 2020-07-07 | End: 2020-07-07

## 2020-07-07 RX ADMIN — HYDROCHLOROTHIAZIDE 25 MG: 25 TABLET ORAL at 21:07

## 2020-07-07 RX ADMIN — IBUPROFEN 600 MG: 600 TABLET ORAL at 20:39

## 2020-07-07 ASSESSMENT — FIBROSIS 4 INDEX: FIB4 SCORE: 1.68

## 2020-07-08 VITALS
WEIGHT: 160 LBS | OXYGEN SATURATION: 97 % | RESPIRATION RATE: 18 BRPM | SYSTOLIC BLOOD PRESSURE: 137 MMHG | HEART RATE: 99 BPM | HEIGHT: 64 IN | BODY MASS INDEX: 27.31 KG/M2 | DIASTOLIC BLOOD PRESSURE: 84 MMHG | TEMPERATURE: 98.1 F

## 2020-07-08 RX ORDER — HYDROCHLOROTHIAZIDE 25 MG/1
25 TABLET ORAL DAILY
Qty: 30 TAB | Refills: 0 | Status: SHIPPED | OUTPATIENT
Start: 2020-07-08 | End: 2020-07-20

## 2020-07-08 RX ORDER — HYDROCHLOROTHIAZIDE 25 MG/1
25 TABLET ORAL DAILY
Qty: 30 TAB | Refills: 0 | Status: SHIPPED | OUTPATIENT
Start: 2020-07-08 | End: 2020-07-08 | Stop reason: SDUPTHER
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
## 2020-07-08 NOTE — DISCHARGE INSTRUCTIONS
You were seen in the ER for alcohol intoxication and right arm pain.  You declined a work-up for your arm pain but did request a refill of your blood pressure medication.  This was provided to you and you did receive a dose in the ER prior to discharge.  I recommend that you follow-up with your primary care physician within 48 hours for a recheck and also to discuss discontinuation of alcohol use under her or any other medical providers care.  Should you develop any new or worsening symptoms please return immediately to the ER.  Good luck, hope you feel better soon!
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
## 2020-07-08 NOTE — ED NOTES
Discharge instructions given to pt. Pt speaking in clear full sentences. Prescriptions handed to pt at bedside. Pt educated, verbalizes understanding. All belongings accounted for. Pt ambulated out of ED with steady gait to go home by self.

## 2020-07-09 ENCOUNTER — HOSPITAL ENCOUNTER (EMERGENCY)
Facility: MEDICAL CENTER | Age: 60
End: 2020-07-09
Payer: MEDICAID

## 2020-07-09 ENCOUNTER — HOSPITAL ENCOUNTER (EMERGENCY)
Facility: MEDICAL CENTER | Age: 60
End: 2020-07-09
Attending: EMERGENCY MEDICINE
Payer: MEDICAID

## 2020-07-09 VITALS
HEIGHT: 64 IN | OXYGEN SATURATION: 98 % | HEART RATE: 85 BPM | BODY MASS INDEX: 27.31 KG/M2 | WEIGHT: 160 LBS | DIASTOLIC BLOOD PRESSURE: 62 MMHG | TEMPERATURE: 98 F | SYSTOLIC BLOOD PRESSURE: 125 MMHG | RESPIRATION RATE: 17 BRPM

## 2020-07-09 VITALS
RESPIRATION RATE: 18 BRPM | HEART RATE: 115 BPM | SYSTOLIC BLOOD PRESSURE: 167 MMHG | OXYGEN SATURATION: 93 % | TEMPERATURE: 97.1 F | DIASTOLIC BLOOD PRESSURE: 97 MMHG | HEIGHT: 64 IN | WEIGHT: 160 LBS | BODY MASS INDEX: 27.31 KG/M2

## 2020-07-09 DIAGNOSIS — F10.920 ALCOHOLIC INTOXICATION WITHOUT COMPLICATION (HCC): ICD-10-CM

## 2020-07-09 LAB — ETHANOL BLD-MCNC: 435.2 MG/DL (ref 0–10.1)

## 2020-07-09 PROCEDURE — 302449 STATCHG TRIAGE ONLY (STATISTIC)

## 2020-07-09 PROCEDURE — 99284 EMERGENCY DEPT VISIT MOD MDM: CPT

## 2020-07-09 PROCEDURE — 80307 DRUG TEST PRSMV CHEM ANLYZR: CPT

## 2020-07-09 ASSESSMENT — FIBROSIS 4 INDEX
FIB4 SCORE: 1.68
FIB4 SCORE: 1.68

## 2020-07-09 NOTE — ED NOTES
"Patient walked to bathroom with steady gait. Pt had been in bathroom for approx 10-15 min. This RN to bathroom to check on patient. Patient yelled out to RN, \"I am using the bathroom\".  "

## 2020-07-09 NOTE — ED NOTES
Attempted to call pt daughter, Katalina, to pick pt up from ER upon discharge. Katalina states she will not pick her mother up. Pt aware. Pt does not have any other contacts to pick her up.

## 2020-07-09 NOTE — ED PROVIDER NOTES
ED Provider Note    0600 -patient signed out to me by my colleague, Dr. Lovett, for reevaluation of final disposition upon sobriety.  Please refer to his note for complete details.  Patient with history of chronic alcohol abuse, known to this facility, arrived with blood alcohol 435.  No reported history of clinical evidence for trauma.  Patient is arousable but returns to sleep.    0800 -patient reevaluated at bedside.  Sleeping but easily arousable.  Requesting something to drink.    8:58 AM patient ambulates to the bathroom independently.  Tolerates oral fluids, refuses food.  Requesting discharge.  She is clinically sober and cooperative otherwise.  Will discharge as planned.

## 2020-07-09 NOTE — DISCHARGE INSTRUCTIONS
Follow-up with primary care 1 to 2 days for reevaluation, medication management and close blood pressure monitoring.  Referral for detox or substance abuse counseling is encouraged.    Stop drinking alcohol to excess.    Return to the emergency department for altered mental status, seizure, hallucination, fever, vomiting or other new concerns.

## 2020-07-09 NOTE — ED PROVIDER NOTES
ED Provider Note    CHIEF COMPLAINT  Chief Complaint   Patient presents with   • Alcohol Intoxication       HPI  Ashleigh Marquis is a 59 y.o. female who presents to the emergency department with altered mental status.  Reported alcohol intake.  Limited history from patient secondary to her clinical presentation.  No signs of trauma.  No overt history or signs of intent to harm self.    REVIEW OF SYSTEMS  See HPI for further details. All other systems are negative.     PAST MEDICAL HISTORY   has a past medical history of Alcoholism (HCC), Anxiety, Arthritis, ASTHMA, Cancer (HCC) (1981), Chronic low back pain, Congestive heart failure (HCC), Depression, EtOH dependence ([facility]), Fall, GERD (gastroesophageal reflux disease), HTN, Hypertension, Indigestion, Muscle disorder, OSTEOPOROSIS, Other specified symptom associated with female genital organs, Psychiatric disorder, PTSD (post-traumatic stress disorder), Renal disorder, Seizure ([facility]), Ulcer, and Vitamin D deficiency.    SOCIAL HISTORY  Social History     Tobacco Use   • Smoking status: Current Every Day Smoker     Packs/day: 0.50     Years: 20.00     Pack years: 10.00     Types: Cigarettes   • Smokeless tobacco: Never Used   • Tobacco comment: 1/2 pack per day   Substance and Sexual Activity   • Alcohol use: Yes     Frequency: 4 or more times a week     Binge frequency: Daily or almost daily     Comment: vodka, heavy use   • Drug use: No   • Sexual activity: Never     Partners: Male       SURGICAL HISTORY   has a past surgical history that includes hernia repair (1977); cysto stent placemnt pre surg (10/7/2010); cystoscopy stent placement (11/9/2010); ureteroscopy (11/9/2010); lasertripsy (11/9/2010); stent removal (11/9/2010); and gastroscopy-endo (N/A, 10/3/2018).    CURRENT MEDICATIONS  Home Medications     Reviewed by Mariza Huff R.N. (Registered Nurse) on 07/09/20 at 0034  Med List Status: Not Addressed   Medication Last Dose Status   acetaminophen  "(TYLENOL) 325 MG Tab  Active   albuterol 108 (90 Base) MCG/ACT Aero Soln inhalation aerosol  Active   doxepin (SINEQUAN) 10 MG Cap  Active   hydroCHLOROthiazide (HYDRODIURIL) 25 MG Tab  Active   hydroCHLOROthiazide (HYDRODIURIL) 25 MG Tab  Active   lisinopril (PRINIVIL) 10 MG Tab  Active   loperamide (IMODIUM) 2 MG Cap  Active   Magnesium 400 MG Tab  Active   metoprolol (LOPRESSOR) 25 MG Tab  Active   potassium chloride SA (KDUR) 20 MEQ Tab CR  Active   QUEtiapine (SEROQUEL) 25 MG Tab  Active                ALLERGIES  Allergies   Allergen Reactions   • Sulfa Drugs Vomiting   • Toradol Vomiting       PHYSICAL EXAM  VITAL SIGNS: /89   Pulse (!) 108   Temp 36.6 °C (97.9 °F) (Oral)   Resp 14   Ht 1.626 m (5' 4\")   Wt 72.6 kg (160 lb)   LMP 11/02/2015   SpO2 97%   BMI 27.46 kg/m²  @[id number]::@  Pulse ox interpretation: I interpret this pulse ox as normal.  Constitutional: Somnolent  HENT: Normocephalic, Atraumatic, Bilateral external ears normal. Nose normal.   Eyes: Pupils are equal and reactive. Conjunctiva normal, non-icteric.   Heart: Regular rate and rythm, no murmurs.    Lungs: Clear to auscultation bilaterally.  Skin: Warm, Dry, No erythema, No rash.   Neurologic: Alert, arouses to loud voice and tactile stimulus.  Slurred speech.        No orders to display   Alcohol : 435    COURSE & MEDICAL DECISION MAKING  Pertinent Labs & Imaging studies reviewed. (See chart for details)  Patient presented the emerge department for recurrent alcohol intoxication.  History as above.  Very limited history from patient initially secondary to her gross alcohol desiccation.  Patient has slept throughout her observation.  I have signed out to my colleague which will continue to monitor for ongoing increasing sobriety and will reassess for final disposition which will likely be home after ambulatory.    The patient will return for worsening symptoms and is stable at the time of discharge. The patient verbalizes " understanding and will comply.    FINAL IMPRESSION  1. Alcohol intoxication with delirium (HCC)               Electronically signed by: Win Lovett M.D., 7/9/2020 1:51 AM

## 2020-07-09 NOTE — ED NOTES
Patient given discharge instructions, including follow up care information. Patient ambulated independently to [facility] where she used lobby phone to call a cab. Pt reports she has cash to pay for cab. Patient denies other questions/concerns about discharge.

## 2020-07-09 NOTE — ED TRIAGE NOTES
"Ashleigh Marquis   Chief Complaint   Patient presents with   • Alcohol Intoxication       Patient biba to triage for above complaint. [place] told [place] she was \"really tired\". Patient has slurred speech and is clearly intoxicated. Patient c/o R elbow pain, denies trauma. AOx4, GCS 15. Walking with steady gait.      Educated on triage process, back out to lobby with mask in place.   Blood Pressure: 130/89, Pulse: (!) 108, Respiration: 14, Temperature: 36.6 °C (97.9 °F), Height: 162.6 cm (5' 4\"), Weight: 72.6 kg (160 lb), BMI (Calculated): 27.46, BSA (Calculated): 1.8, Pulse Oximetry: 97 %     "

## 2020-07-09 NOTE — ED NOTES
Received report from TETO Miller. Assumed care of patient. Pt sleeping in bed, RR even and unlabored. Pt arouses to verbal stimuli. Pt c/o headache. Pt denies other complaints/needs. Pt advised on staff change, verbalized understanding.

## 2020-07-10 ENCOUNTER — APPOINTMENT (OUTPATIENT)
Dept: RADIOLOGY | Facility: MEDICAL CENTER | Age: 60
End: 2020-07-10
Attending: EMERGENCY MEDICINE
Payer: MEDICAID

## 2020-07-10 ENCOUNTER — HOSPITAL ENCOUNTER (EMERGENCY)
Facility: MEDICAL CENTER | Age: 60
End: 2020-07-10
Attending: EMERGENCY MEDICINE | Admitting: EMERGENCY MEDICINE
Payer: MEDICAID

## 2020-07-10 VITALS
SYSTOLIC BLOOD PRESSURE: 152 MMHG | WEIGHT: 155.65 LBS | HEIGHT: 64 IN | BODY MASS INDEX: 26.57 KG/M2 | TEMPERATURE: 98.6 F | HEART RATE: 87 BPM | RESPIRATION RATE: 16 BRPM | DIASTOLIC BLOOD PRESSURE: 88 MMHG | OXYGEN SATURATION: 98 %

## 2020-07-10 DIAGNOSIS — F10.929 ALCOHOLIC INTOXICATION WITH COMPLICATION (HCC): ICD-10-CM

## 2020-07-10 LAB — GLUCOSE BLD-MCNC: 134 MG/DL (ref 65–99)

## 2020-07-10 PROCEDURE — 71045 X-RAY EXAM CHEST 1 VIEW: CPT

## 2020-07-10 PROCEDURE — A9270 NON-COVERED ITEM OR SERVICE: HCPCS | Performed by: EMERGENCY MEDICINE

## 2020-07-10 PROCEDURE — 99285 EMERGENCY DEPT VISIT HI MDM: CPT

## 2020-07-10 PROCEDURE — 82962 GLUCOSE BLOOD TEST: CPT

## 2020-07-10 PROCEDURE — 700102 HCHG RX REV CODE 250 W/ 637 OVERRIDE(OP): Performed by: EMERGENCY MEDICINE

## 2020-07-10 RX ORDER — ACETAMINOPHEN 325 MG/1
650 TABLET ORAL ONCE
Status: COMPLETED | OUTPATIENT
Start: 2020-07-10 | End: 2020-07-10

## 2020-07-10 RX ADMIN — ACETAMINOPHEN 650 MG: 325 TABLET, FILM COATED ORAL at 16:30

## 2020-07-10 RX ADMIN — THERA TABS 1 TABLET: TAB at 15:45

## 2020-07-10 ASSESSMENT — FIBROSIS 4 INDEX: FIB4 SCORE: 1.68

## 2020-07-10 NOTE — ED NOTES
Pt was called to be roomed multiple times. Pt had been in the woman's bathroom the entirety of her wait. Pt refused to leave the bathroom. Pt was asked to come out so that she could be seen by a physician. Pt continued to refuse care or to come out of the bathroom. RN notified along with charge. Security was called. Pt to be dismissed and trespassed.
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
## 2020-07-10 NOTE — ED TRIAGE NOTES
Pt to triage, pt appears intoxicated, was brought in by [facility]. Pt has been seen here frequently for same. Pt seen here twice yesterday. Pt disheveled. Yelling out at times in the lobby. Pt to  36
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
## 2020-07-10 NOTE — ED NOTES
"Pt is refusing to come out of a bathroom stall after being in there for hours.  Pt told multiple times that she has a room assignment to which pt states \"I dont give a fuck\"  Pt also seen drinking her pint of vodka and continuing to swear at security.  Pt escorted off property by security with asteady gait  "

## 2020-07-11 ENCOUNTER — HOSPITAL ENCOUNTER (EMERGENCY)
Dept: HOSPITAL 8 - ED | Age: 60
Discharge: LEFT BEFORE DISCHARGE | End: 2020-07-11
Payer: MEDICAID

## 2020-07-11 VITALS — BODY MASS INDEX: 26.12 KG/M2 | HEIGHT: 69 IN | WEIGHT: 176.37 LBS

## 2020-07-11 VITALS — SYSTOLIC BLOOD PRESSURE: 146 MMHG | DIASTOLIC BLOOD PRESSURE: 89 MMHG

## 2020-07-11 DIAGNOSIS — F10.120: Primary | ICD-10-CM
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
## 2020-07-11 DIAGNOSIS — Z72.9: ICD-10-CM

## 2020-07-11 DIAGNOSIS — I10: ICD-10-CM

## 2020-07-11 DIAGNOSIS — F17.210: ICD-10-CM

## 2020-07-11 DIAGNOSIS — E11.9: ICD-10-CM

## 2020-07-11 PROCEDURE — 99283 EMERGENCY DEPT VISIT LOW MDM: CPT

## 2020-07-11 PROCEDURE — 99406 BEHAV CHNG SMOKING 3-10 MIN: CPT

## 2020-07-11 NOTE — ED NOTES
.Pt given d/c instructions. Pt able to illustrate understanding as evidence by verbal feed-back of information given. Pt is stable for d/c at present time. Pt has no further concerns at present time. Pt able to ambulate. Pt escorted by wheelchair.

## 2020-07-15 ENCOUNTER — HOSPITAL ENCOUNTER (EMERGENCY)
Facility: MEDICAL CENTER | Age: 60
End: 2020-07-15
Payer: MEDICAID

## 2020-07-15 ENCOUNTER — HOSPITAL ENCOUNTER (EMERGENCY)
Facility: MEDICAL CENTER | Age: 60
End: 2020-07-15
Attending: EMERGENCY MEDICINE
Payer: MEDICAID

## 2020-07-15 VITALS
HEART RATE: 116 BPM | BODY MASS INDEX: 26.57 KG/M2 | OXYGEN SATURATION: 96 % | HEIGHT: 64 IN | RESPIRATION RATE: 16 BRPM | DIASTOLIC BLOOD PRESSURE: 96 MMHG | TEMPERATURE: 96.6 F | WEIGHT: 155.65 LBS | SYSTOLIC BLOOD PRESSURE: 144 MMHG

## 2020-07-15 VITALS
DIASTOLIC BLOOD PRESSURE: 81 MMHG | HEART RATE: 111 BPM | OXYGEN SATURATION: 92 % | RESPIRATION RATE: 20 BRPM | SYSTOLIC BLOOD PRESSURE: 126 MMHG | TEMPERATURE: 97.5 F

## 2020-07-15 PROCEDURE — 302449 STATCHG TRIAGE ONLY (STATISTIC)

## 2020-07-15 ASSESSMENT — LIFESTYLE VARIABLES: DO YOU DRINK ALCOHOL: YES

## 2020-07-15 ASSESSMENT — FIBROSIS 4 INDEX: FIB4 SCORE: 1.68

## 2020-07-16 ENCOUNTER — HOSPITAL ENCOUNTER (EMERGENCY)
Facility: MEDICAL CENTER | Age: 60
End: 2020-07-16
Attending: EMERGENCY MEDICINE
Payer: MEDICAID

## 2020-07-16 ENCOUNTER — HOSPITAL ENCOUNTER (EMERGENCY)
Dept: HOSPITAL 8 - ED | Age: 60
LOS: 1 days | Discharge: HOME | End: 2020-07-17
Payer: MEDICAID

## 2020-07-16 VITALS
DIASTOLIC BLOOD PRESSURE: 64 MMHG | BODY MASS INDEX: 26.46 KG/M2 | HEIGHT: 64 IN | WEIGHT: 155 LBS | OXYGEN SATURATION: 96 % | RESPIRATION RATE: 18 BRPM | TEMPERATURE: 97.7 F | HEART RATE: 96 BPM | SYSTOLIC BLOOD PRESSURE: 118 MMHG

## 2020-07-16 VITALS — HEIGHT: 64 IN | WEIGHT: 165.35 LBS | BODY MASS INDEX: 28.23 KG/M2

## 2020-07-16 DIAGNOSIS — K21.9: ICD-10-CM

## 2020-07-16 DIAGNOSIS — F10.220: Primary | ICD-10-CM

## 2020-07-16 DIAGNOSIS — E11.9: ICD-10-CM
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
## 2020-07-16 DIAGNOSIS — F10.929 ALCOHOLIC INTOXICATION WITH COMPLICATION (HCC): ICD-10-CM

## 2020-07-16 DIAGNOSIS — F17.210: ICD-10-CM

## 2020-07-16 DIAGNOSIS — I10: ICD-10-CM

## 2020-07-16 DIAGNOSIS — Z72.9: ICD-10-CM

## 2020-07-16 LAB
BASOPHILS # BLD AUTO: 0.04 X10^3/UL (ref 0–0.1)
BASOPHILS NFR BLD AUTO: 1 % (ref 0–1)
EOSINOPHIL # BLD AUTO: 0.09 X10^3/UL (ref 0–0.4)
EOSINOPHIL NFR BLD AUTO: 2 % (ref 1–7)
ERYTHROCYTE [DISTWIDTH] IN BLOOD BY AUTOMATED COUNT: 19.6 % (ref 9.6–15.2)
LYMPHOCYTES # BLD AUTO: 1.68 X10^3/UL (ref 1–3.4)
LYMPHOCYTES NFR BLD AUTO: 45 % (ref 22–44)
MCH RBC QN AUTO: 31.6 PG (ref 27–34.8)
MCHC RBC AUTO-ENTMCNC: 33.3 G/DL (ref 32.4–35.8)
MCV RBC AUTO: 95 FL (ref 80–100)
MD: NO
MONOCYTES # BLD AUTO: 0.38 X10^3/UL (ref 0.2–0.8)
MONOCYTES NFR BLD AUTO: 10 % (ref 2–9)
NEUTROPHILS # BLD AUTO: 1.54 X10^3/UL (ref 1.8–6.8)
NEUTROPHILS NFR BLD AUTO: 41 % (ref 42–75)
PLATELET # BLD AUTO: 107 X10^3/UL (ref 130–400)
PMV BLD AUTO: 8 FL (ref 7.4–10.4)
RBC # BLD AUTO: 3.57 X10^6/UL (ref 3.82–5.3)

## 2020-07-16 PROCEDURE — 85025 COMPLETE CBC W/AUTO DIFF WBC: CPT

## 2020-07-16 PROCEDURE — 72125 CT NECK SPINE W/O DYE: CPT

## 2020-07-16 PROCEDURE — 99284 EMERGENCY DEPT VISIT MOD MDM: CPT

## 2020-07-16 PROCEDURE — 71045 X-RAY EXAM CHEST 1 VIEW: CPT

## 2020-07-16 PROCEDURE — 80053 COMPREHEN METABOLIC PANEL: CPT

## 2020-07-16 PROCEDURE — 70450 CT HEAD/BRAIN W/O DYE: CPT

## 2020-07-16 PROCEDURE — 80307 DRUG TEST PRSMV CHEM ANLYZR: CPT

## 2020-07-16 PROCEDURE — 36415 COLL VENOUS BLD VENIPUNCTURE: CPT

## 2020-07-16 PROCEDURE — 99406 BEHAV CHNG SMOKING 3-10 MIN: CPT

## 2020-07-16 PROCEDURE — 99285 EMERGENCY DEPT VISIT HI MDM: CPT

## 2020-07-16 ASSESSMENT — LIFESTYLE VARIABLES
CONSUMPTION TOTAL: INCOMPLETE
DO YOU DRINK ALCOHOL: YES
TOTAL SCORE: 4
EVER FELT BAD OR GUILTY ABOUT YOUR DRINKING: YES
EVER HAD A DRINK FIRST THING IN THE MORNING TO STEADY YOUR NERVES TO GET RID OF A HANGOVER: YES
HAVE YOU EVER FELT YOU SHOULD CUT DOWN ON YOUR DRINKING: YES
TOTAL SCORE: 4
TOTAL SCORE: 4
HAVE PEOPLE ANNOYED YOU BY CRITICIZING YOUR DRINKING: YES

## 2020-07-16 ASSESSMENT — FIBROSIS 4 INDEX: FIB4 SCORE: 1.68

## 2020-07-16 NOTE — ED NOTES
Pt decide to leave before seing an ERP, pt ambulated steady. Walled with out assistance to sue branham.

## 2020-07-16 NOTE — ED TRIAGE NOTES
Pt to [facility] via EMS. Pt endorses ETOH use but is unable to state how much. Denies drug use AAOx3 GCS 15

## 2020-07-16 NOTE — ED TRIAGE NOTES
Patient left prior to seeing an emergency physician. Triage RN notified prior to patient leaving department.

## 2020-07-16 NOTE — ED NOTES
"Pt to [place] [facility] with triage RN. Pt A&Ox4, speech is slurred. Pt states her last drink was today, \"I drink a lot, I want to stop.\" Pt connected to monitor, assessment complete. VSS. Will continue to monitor.   "
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
## 2020-07-16 NOTE — ED TRIAGE NOTES
Chief Complaint   Patient presents with   • Alcohol Intoxication   BIB [facility] found in front of [facility] intoxicated pt states she drinks 4 pints vodka.

## 2020-07-16 NOTE — ED TRIAGE NOTES
Chief Complaint   Patient presents with   • Alcohol Intoxication   BIB [facility] found in front of [facility] intoxicated pt states she drinks 4 pints vodka. Pt returned to ED post leaving prior to being seen, pt to room.

## 2020-07-17 VITALS — DIASTOLIC BLOOD PRESSURE: 62 MMHG | SYSTOLIC BLOOD PRESSURE: 108 MMHG

## 2020-07-17 LAB
ALBUMIN SERPL-MCNC: 3.3 G/DL (ref 3.4–5)
ALP SERPL-CCNC: 169 U/L (ref 45–117)
ALT SERPL-CCNC: 47 U/L (ref 12–78)
ANION GAP SERPL CALC-SCNC: 10 MMOL/L (ref 5–15)
BILIRUB SERPL-MCNC: 0.3 MG/DL (ref 0.2–1)
CALCIUM SERPL-MCNC: 8.5 MG/DL (ref 8.5–10.1)
CHLORIDE SERPL-SCNC: 106 MMOL/L (ref 98–107)
CREAT SERPL-MCNC: 0.83 MG/DL (ref 0.55–1.02)
PROT SERPL-MCNC: 7.3 G/DL (ref 6.4–8.2)

## 2020-07-18 ENCOUNTER — HOSPITAL ENCOUNTER (EMERGENCY)
Dept: HOSPITAL 8 - ED | Age: 60
Discharge: LEFT BEFORE DISCHARGE | End: 2020-07-18
Payer: MEDICAID

## 2020-07-18 VITALS — SYSTOLIC BLOOD PRESSURE: 115 MMHG | DIASTOLIC BLOOD PRESSURE: 90 MMHG

## 2020-07-18 DIAGNOSIS — E11.9: ICD-10-CM

## 2020-07-18 DIAGNOSIS — I10: ICD-10-CM
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
## 2020-07-18 DIAGNOSIS — F10.220: Primary | ICD-10-CM

## 2020-07-18 DIAGNOSIS — F17.200: ICD-10-CM

## 2020-07-18 DIAGNOSIS — K21.9: ICD-10-CM

## 2020-07-18 PROCEDURE — 99283 EMERGENCY DEPT VISIT LOW MDM: CPT

## 2020-07-19 ENCOUNTER — HOSPITAL ENCOUNTER (EMERGENCY)
Dept: HOSPITAL 8 - ED | Age: 60
LOS: 1 days | End: 2020-07-20
Payer: MEDICAID

## 2020-07-19 VITALS — HEIGHT: 64 IN | WEIGHT: 165.35 LBS | BODY MASS INDEX: 28.23 KG/M2

## 2020-07-19 DIAGNOSIS — K21.9: ICD-10-CM

## 2020-07-19 DIAGNOSIS — I10: ICD-10-CM

## 2020-07-19 DIAGNOSIS — E11.9: ICD-10-CM
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
## 2020-07-19 DIAGNOSIS — Z72.9: ICD-10-CM

## 2020-07-19 DIAGNOSIS — F10.120: Primary | ICD-10-CM

## 2020-07-19 PROCEDURE — 99283 EMERGENCY DEPT VISIT LOW MDM: CPT

## 2020-07-20 ENCOUNTER — APPOINTMENT (OUTPATIENT)
Dept: RADIOLOGY | Facility: MEDICAL CENTER | Age: 60
DRG: 439 | End: 2020-07-20
Attending: EMERGENCY MEDICINE
Payer: MEDICAID

## 2020-07-20 ENCOUNTER — HOSPITAL ENCOUNTER (INPATIENT)
Facility: MEDICAL CENTER | Age: 60
LOS: 2 days | DRG: 439 | End: 2020-07-23
Attending: EMERGENCY MEDICINE | Admitting: HOSPITALIST
Payer: MEDICAID

## 2020-07-20 VITALS — DIASTOLIC BLOOD PRESSURE: 92 MMHG | SYSTOLIC BLOOD PRESSURE: 161 MMHG

## 2020-07-20 DIAGNOSIS — J45.21 MILD INTERMITTENT ASTHMA WITH EXACERBATION: ICD-10-CM

## 2020-07-20 LAB
ALBUMIN SERPL BCP-MCNC: 4 G/DL (ref 3.2–4.9)
ALBUMIN/GLOB SERPL: 1.2 G/DL
ALP SERPL-CCNC: 230 U/L (ref 30–99)
ALT SERPL-CCNC: 57 U/L (ref 2–50)
ANION GAP SERPL CALC-SCNC: 22 MMOL/L (ref 7–16)
AST SERPL-CCNC: 174 U/L (ref 12–45)
BILIRUB SERPL-MCNC: 1 MG/DL (ref 0.1–1.5)
BUN SERPL-MCNC: 12 MG/DL (ref 8–22)
CALCIUM SERPL-MCNC: 9 MG/DL (ref 8.5–10.5)
CHLORIDE SERPL-SCNC: 98 MMOL/L (ref 96–112)
CO2 SERPL-SCNC: 23 MMOL/L (ref 20–33)
CREAT SERPL-MCNC: 0.71 MG/DL (ref 0.5–1.4)
ETHANOL BLD-MCNC: 275.3 MG/DL (ref 0–10.1)
GLOBULIN SER CALC-MCNC: 3.3 G/DL (ref 1.9–3.5)
GLUCOSE BLD-MCNC: 123 MG/DL (ref 65–99)
GLUCOSE SERPL-MCNC: 58 MG/DL (ref 65–99)
LIPASE SERPL-CCNC: 107 U/L (ref 11–82)
POTASSIUM SERPL-SCNC: 3.2 MMOL/L (ref 3.6–5.5)
PROT SERPL-MCNC: 7.3 G/DL (ref 6–8.2)
SODIUM SERPL-SCNC: 143 MMOL/L (ref 135–145)
TROPONIN T SERPL-MCNC: 13 NG/L (ref 6–19)

## 2020-07-20 PROCEDURE — HZ2ZZZZ DETOXIFICATION SERVICES FOR SUBSTANCE ABUSE TREATMENT: ICD-10-PCS | Performed by: INTERNAL MEDICINE

## 2020-07-20 PROCEDURE — 700105 HCHG RX REV CODE 258: Performed by: EMERGENCY MEDICINE

## 2020-07-20 PROCEDURE — 73030 X-RAY EXAM OF SHOULDER: CPT | Mod: LT

## 2020-07-20 PROCEDURE — 96375 TX/PRO/DX INJ NEW DRUG ADDON: CPT

## 2020-07-20 PROCEDURE — 96365 THER/PROPH/DIAG IV INF INIT: CPT

## 2020-07-20 PROCEDURE — 99219 PR INITIAL OBSERVATION CARE,LEVL II: CPT | Performed by: INTERNAL MEDICINE

## 2020-07-20 PROCEDURE — 96376 TX/PRO/DX INJ SAME DRUG ADON: CPT

## 2020-07-20 PROCEDURE — 700101 HCHG RX REV CODE 250: Performed by: EMERGENCY MEDICINE

## 2020-07-20 PROCEDURE — G0378 HOSPITAL OBSERVATION PER HR: HCPCS

## 2020-07-20 PROCEDURE — 700111 HCHG RX REV CODE 636 W/ 250 OVERRIDE (IP): Performed by: EMERGENCY MEDICINE

## 2020-07-20 PROCEDURE — C9803 HOPD COVID-19 SPEC COLLECT: HCPCS | Performed by: INTERNAL MEDICINE

## 2020-07-20 PROCEDURE — 80307 DRUG TEST PRSMV CHEM ANLYZR: CPT

## 2020-07-20 PROCEDURE — 73501 X-RAY EXAM HIP UNI 1 VIEW: CPT | Mod: LT

## 2020-07-20 PROCEDURE — 82962 GLUCOSE BLOOD TEST: CPT

## 2020-07-20 PROCEDURE — 84484 ASSAY OF TROPONIN QUANT: CPT

## 2020-07-20 PROCEDURE — 83690 ASSAY OF LIPASE: CPT

## 2020-07-20 PROCEDURE — 99285 EMERGENCY DEPT VISIT HI MDM: CPT

## 2020-07-20 PROCEDURE — 80053 COMPREHEN METABOLIC PANEL: CPT

## 2020-07-20 RX ORDER — QUETIAPINE FUMARATE 25 MG/1
50 TABLET, FILM COATED ORAL
Status: DISCONTINUED | OUTPATIENT
Start: 2020-07-20 | End: 2020-07-23 | Stop reason: HOSPADM

## 2020-07-20 RX ORDER — LORAZEPAM 1 MG/1
1 TABLET ORAL EVERY 4 HOURS PRN
Status: DISCONTINUED | OUTPATIENT
Start: 2020-07-20 | End: 2020-07-23

## 2020-07-20 RX ORDER — PROMETHAZINE HYDROCHLORIDE 25 MG/1
12.5-25 TABLET ORAL EVERY 4 HOURS PRN
Status: DISCONTINUED | OUTPATIENT
Start: 2020-07-20 | End: 2020-07-23 | Stop reason: HOSPADM

## 2020-07-20 RX ORDER — LORAZEPAM 2 MG/ML
1 INJECTION INTRAMUSCULAR
Status: DISCONTINUED | OUTPATIENT
Start: 2020-07-20 | End: 2020-07-23

## 2020-07-20 RX ORDER — LORAZEPAM 2 MG/1
2 TABLET ORAL
Status: DISCONTINUED | OUTPATIENT
Start: 2020-07-20 | End: 2020-07-23

## 2020-07-20 RX ORDER — DOXEPIN HYDROCHLORIDE 10 MG/1
10 CAPSULE ORAL NIGHTLY
Status: DISCONTINUED | OUTPATIENT
Start: 2020-07-20 | End: 2020-07-23 | Stop reason: HOSPADM

## 2020-07-20 RX ORDER — LORAZEPAM 2 MG/ML
2 INJECTION INTRAMUSCULAR
Status: DISCONTINUED | OUTPATIENT
Start: 2020-07-20 | End: 2020-07-23

## 2020-07-20 RX ORDER — LORAZEPAM 1 MG/1
0.5 TABLET ORAL EVERY 4 HOURS PRN
Status: DISCONTINUED | OUTPATIENT
Start: 2020-07-20 | End: 2020-07-23

## 2020-07-20 RX ORDER — LORAZEPAM 2 MG/ML
0.5 INJECTION INTRAMUSCULAR ONCE
Status: COMPLETED | OUTPATIENT
Start: 2020-07-20 | End: 2020-07-20

## 2020-07-20 RX ORDER — SODIUM CHLORIDE 9 MG/ML
1000 INJECTION, SOLUTION INTRAVENOUS ONCE
Status: COMPLETED | OUTPATIENT
Start: 2020-07-20 | End: 2020-07-20

## 2020-07-20 RX ORDER — LORAZEPAM 2 MG/ML
1.5 INJECTION INTRAMUSCULAR
Status: DISCONTINUED | OUTPATIENT
Start: 2020-07-20 | End: 2020-07-23

## 2020-07-20 RX ORDER — LORAZEPAM 2 MG/1
4 TABLET ORAL
Status: DISCONTINUED | OUTPATIENT
Start: 2020-07-20 | End: 2020-07-23

## 2020-07-20 RX ORDER — THIAMINE MONONITRATE (VIT B1) 100 MG
100 TABLET ORAL DAILY
Status: DISCONTINUED | OUTPATIENT
Start: 2020-07-21 | End: 2020-07-23 | Stop reason: HOSPADM

## 2020-07-20 RX ORDER — ALBUTEROL SULFATE 90 UG/1
2 AEROSOL, METERED RESPIRATORY (INHALATION)
Status: DISCONTINUED | OUTPATIENT
Start: 2020-07-20 | End: 2020-07-23 | Stop reason: HOSPADM

## 2020-07-20 RX ORDER — LORAZEPAM 2 MG/ML
0.5 INJECTION INTRAMUSCULAR EVERY 4 HOURS PRN
Status: DISCONTINUED | OUTPATIENT
Start: 2020-07-20 | End: 2020-07-23

## 2020-07-20 RX ORDER — OXYCODONE HYDROCHLORIDE 5 MG/1
5 TABLET ORAL
Status: DISCONTINUED | OUTPATIENT
Start: 2020-07-20 | End: 2020-07-22

## 2020-07-20 RX ORDER — LORAZEPAM 2 MG/ML
1 INJECTION INTRAMUSCULAR ONCE
Status: COMPLETED | OUTPATIENT
Start: 2020-07-20 | End: 2020-07-20

## 2020-07-20 RX ORDER — SUCRALFATE 1 G/1
1 TABLET ORAL EVERY 6 HOURS
Status: DISPENSED | OUTPATIENT
Start: 2020-07-21 | End: 2020-07-23

## 2020-07-20 RX ORDER — OXYCODONE HYDROCHLORIDE 10 MG/1
10 TABLET ORAL
Status: DISCONTINUED | OUTPATIENT
Start: 2020-07-20 | End: 2020-07-22

## 2020-07-20 RX ORDER — PROCHLORPERAZINE EDISYLATE 5 MG/ML
5-10 INJECTION INTRAMUSCULAR; INTRAVENOUS EVERY 4 HOURS PRN
Status: DISCONTINUED | OUTPATIENT
Start: 2020-07-20 | End: 2020-07-23 | Stop reason: HOSPADM

## 2020-07-20 RX ORDER — ONDANSETRON 4 MG/1
4 TABLET, ORALLY DISINTEGRATING ORAL EVERY 4 HOURS PRN
Status: DISCONTINUED | OUTPATIENT
Start: 2020-07-20 | End: 2020-07-23 | Stop reason: HOSPADM

## 2020-07-20 RX ORDER — MORPHINE SULFATE 4 MG/ML
4 INJECTION, SOLUTION INTRAMUSCULAR; INTRAVENOUS
Status: DISCONTINUED | OUTPATIENT
Start: 2020-07-20 | End: 2020-07-22

## 2020-07-20 RX ORDER — LISINOPRIL 10 MG/1
10 TABLET ORAL DAILY
Status: DISCONTINUED | OUTPATIENT
Start: 2020-07-21 | End: 2020-07-23 | Stop reason: HOSPADM

## 2020-07-20 RX ORDER — ONDANSETRON 2 MG/ML
4 INJECTION INTRAMUSCULAR; INTRAVENOUS EVERY 4 HOURS PRN
Status: DISCONTINUED | OUTPATIENT
Start: 2020-07-20 | End: 2020-07-23 | Stop reason: HOSPADM

## 2020-07-20 RX ORDER — POLYETHYLENE GLYCOL 3350 17 G/17G
1 POWDER, FOR SOLUTION ORAL
Status: DISCONTINUED | OUTPATIENT
Start: 2020-07-20 | End: 2020-07-23 | Stop reason: HOSPADM

## 2020-07-20 RX ORDER — SODIUM CHLORIDE AND POTASSIUM CHLORIDE 300; 900 MG/100ML; MG/100ML
INJECTION, SOLUTION INTRAVENOUS CONTINUOUS
Status: DISCONTINUED | OUTPATIENT
Start: 2020-07-20 | End: 2020-07-21

## 2020-07-20 RX ORDER — FAMOTIDINE 20 MG/1
20 TABLET, FILM COATED ORAL 2 TIMES DAILY
Status: DISCONTINUED | OUTPATIENT
Start: 2020-07-20 | End: 2020-07-23 | Stop reason: HOSPADM

## 2020-07-20 RX ORDER — AMOXICILLIN 250 MG
2 CAPSULE ORAL 2 TIMES DAILY
Status: DISCONTINUED | OUTPATIENT
Start: 2020-07-20 | End: 2020-07-23 | Stop reason: HOSPADM

## 2020-07-20 RX ORDER — BISACODYL 10 MG
10 SUPPOSITORY, RECTAL RECTAL
Status: DISCONTINUED | OUTPATIENT
Start: 2020-07-20 | End: 2020-07-23 | Stop reason: HOSPADM

## 2020-07-20 RX ORDER — FOLIC ACID 1 MG/1
1 TABLET ORAL DAILY
Status: DISCONTINUED | OUTPATIENT
Start: 2020-07-21 | End: 2020-07-23 | Stop reason: HOSPADM

## 2020-07-20 RX ORDER — PROMETHAZINE HYDROCHLORIDE 25 MG/1
12.5-25 SUPPOSITORY RECTAL EVERY 4 HOURS PRN
Status: DISCONTINUED | OUTPATIENT
Start: 2020-07-20 | End: 2020-07-23 | Stop reason: HOSPADM

## 2020-07-20 RX ORDER — POTASSIUM CHLORIDE 20 MEQ/1
20 TABLET, EXTENDED RELEASE ORAL DAILY
Status: DISCONTINUED | OUTPATIENT
Start: 2020-07-21 | End: 2020-07-23 | Stop reason: HOSPADM

## 2020-07-20 RX ADMIN — LORAZEPAM 1.5 MG: 2 INJECTION INTRAMUSCULAR; INTRAVENOUS at 23:10

## 2020-07-20 RX ADMIN — THIAMINE HYDROCHLORIDE 1000 ML: 100 INJECTION, SOLUTION INTRAMUSCULAR; INTRAVENOUS at 14:12

## 2020-07-20 RX ADMIN — LORAZEPAM 1 MG: 2 INJECTION INTRAMUSCULAR; INTRAVENOUS at 13:45

## 2020-07-20 RX ADMIN — SODIUM CHLORIDE 1000 ML: 9 INJECTION, SOLUTION INTRAVENOUS at 16:00

## 2020-07-20 RX ADMIN — LORAZEPAM 0.5 MG: 2 INJECTION INTRAMUSCULAR; INTRAVENOUS at 20:56

## 2020-07-20 ASSESSMENT — ENCOUNTER SYMPTOMS
BLURRED VISION: 0
DIARRHEA: 0
CHILLS: 0
COUGH: 0
BRUISES/BLEEDS EASILY: 0
POLYDIPSIA: 0
TREMORS: 1
SPEECH CHANGE: 0
FLANK PAIN: 0
FEVER: 0
NECK PAIN: 0
HEADACHES: 1
HALLUCINATIONS: 0
DOUBLE VISION: 0
NERVOUS/ANXIOUS: 0
VOMITING: 0
WEIGHT LOSS: 0
SPUTUM PRODUCTION: 0
ORTHOPNEA: 0
BACK PAIN: 0
HEARTBURN: 0
FOCAL WEAKNESS: 0
ABDOMINAL PAIN: 1
NAUSEA: 1
PHOTOPHOBIA: 0
PALPITATIONS: 0
HEMOPTYSIS: 0

## 2020-07-20 ASSESSMENT — LIFESTYLE VARIABLES
ORIENTATION AND CLOUDING OF SENSORIUM: ORIENTED AND CAN DO SERIAL ADDITIONS
TACTILE DISTURBANCES: MODERATE ITCHING, PINS AND NEEDLES SENSATION, BURNING OR NUMBNESS
AUDITORY DISTURBANCES: NOT PRESENT
SUBSTANCE_ABUSE: 0
ANXIETY: *
NAUSEA AND VOMITING: *
PAROXYSMAL SWEATS: *
VISUAL DISTURBANCES: MILD SENSITIVITY
AGITATION: NORMAL ACTIVITY
TOTAL SCORE: 21
HEADACHE, FULLNESS IN HEAD: MODERATE
TREMOR: *

## 2020-07-20 ASSESSMENT — FIBROSIS 4 INDEX: FIB4 SCORE: 1.68

## 2020-07-20 NOTE — ED PROVIDER NOTES
"ED Provider Note    CHIEF COMPLAINT  Chief Complaint   Patient presents with   • Abdominal Pain   • Alcohol Intoxication   • Loose Stools       HPI  Ashleigh Marquis is a 59 y.o. female here for evaluation of alcohol abuse and abdominal pain.  Patient states that she has been drinking often, and states that this was probably causing her abdominal pain.  She states that the pain is \"everywhere\" but it does not radiate to the back.  She does have some loose stools as well over the last few days but states that she has not been eating well.  She has no fever chills, no vomiting.  Patient states that she may have fallen on her left hip, but she is not sure, so she is complaining of some left hip pain and left shoulder pain.  They are worse when she moves them around, and improved when she stays still.  She has not taken anything prior to arrival for the discomfort.  She describes the pain is aching to sharp in the left hip and left shoulder.  Her abdominal pain is a dull pain.  She has no chest pain, and no neck pain.      ROS  See HPI for further details, o/w negative.     PAST MEDICAL HISTORY   has a past medical history of Alcoholism ([facility]), Anxiety, Arthritis, ASTHMA, Cancer (HCC) (1981), Chronic low back pain, Congestive heart failure ([facility]), Depression, EtOH dependence ([facility]), Fall, GERD (gastroesophageal reflux disease), HTN, Hypertension, Indigestion, Muscle disorder, OSTEOPOROSIS, Other specified symptom associated with female genital organs, Psychiatric disorder, PTSD (post-traumatic stress disorder), Renal disorder, Seizure ([facility]), Ulcer, and Vitamin D deficiency.    SOCIAL HISTORY  Social History     Tobacco Use   • Smoking status: Current Every Day Smoker     Packs/day: 0.50     Years: 20.00     Pack years: 10.00     Types: Cigarettes   • Smokeless tobacco: Never Used   • Tobacco comment: 1/2 pack per day   Substance and Sexual Activity   • Alcohol use: Yes     Frequency: 4 or more times a week     Binge " frequency: Daily or almost daily     Comment: vodka, heavy use   • Drug use: No   • Sexual activity: Never     Partners: Male       Family History  No bleeding disorders    SURGICAL HISTORY   has a past surgical history that includes hernia repair (1977); cysto stent placemnt pre surg (10/7/2010); cystoscopy stent placement (11/9/2010); ureteroscopy (11/9/2010); lasertripsy (11/9/2010); stent removal (11/9/2010); and gastroscopy-endo (N/A, 10/3/2018).    CURRENT MEDICATIONS  Home Medications     Reviewed by Eduardo Montoya R.N. (Registered Nurse) on 07/20/20 at 1229  Med List Status: <None>   Medication Last Dose Status   acetaminophen (TYLENOL) 325 MG Tab  Active   albuterol 108 (90 Base) MCG/ACT Aero Soln inhalation aerosol  Active   doxepin (SINEQUAN) 10 MG Cap  Active   hydroCHLOROthiazide (HYDRODIURIL) 25 MG Tab  Active   hydroCHLOROthiazide (HYDRODIURIL) 25 MG Tab  Active   lisinopril (PRINIVIL) 10 MG Tab  Active   loperamide (IMODIUM) 2 MG Cap  Active   Magnesium 400 MG Tab  Active   metoprolol (LOPRESSOR) 25 MG Tab  Active   potassium chloride SA (KDUR) 20 MEQ Tab CR  Active   QUEtiapine (SEROQUEL) 25 MG Tab  Active                ALLERGIES  Allergies   Allergen Reactions   • Sulfa Drugs Vomiting   • Toradol Vomiting       REVIEW OF SYSTEMS  See HPI for further details. Review of systems as above, otherwise all other systems are negative.     PHYSICAL EXAM  Constitutional: Disheveled, unkempt, mild distress  HEENT: Normocephalic, atraumatic. Posterior pharynx clear and dry  Eyes:  EOMI. Normal sclera.  Neck: Supple, Full range of motion, nontender.  Chest/Pulmonary: clear to ausculation. Symmetrical expansion.   Cardio: Regular rate and rhythm with no murmur.   Abdomen: Soft, nontender. No peritoneal signs. No guarding. No palpable masses.  Musculoskeletal: No deformity, no edema, neurovascular intact.  There is over the left greater trochanter, nontender left knee.  Tenderness to the left anterior  shoulder, nontender left elbow.  All neurovascular intact distally with with regard to the extremities.  No edema.  Neuro: Slurred speech, appropriate, cooperative, cranial nerves II-XII grossly intact.  Psych: Anxious mood and affect    Results for orders placed or performed during the hospital encounter of 07/20/20   COMP METABOLIC PANEL   Result Value Ref Range    Sodium 143 135 - 145 mmol/L    Potassium 3.2 (L) 3.6 - 5.5 mmol/L    Chloride 98 96 - 112 mmol/L    Co2 23 20 - 33 mmol/L    Anion Gap 22.0 (H) 7.0 - 16.0    Glucose 58 (L) 65 - 99 mg/dL    Bun 12 8 - 22 mg/dL    Creatinine 0.71 0.50 - 1.40 mg/dL    Calcium 9.0 8.5 - 10.5 mg/dL    AST(SGOT) 174 (H) 12 - 45 U/L    ALT(SGPT) 57 (H) 2 - 50 U/L    Alkaline Phosphatase 230 (H) 30 - 99 U/L    Total Bilirubin 1.0 0.1 - 1.5 mg/dL    Albumin 4.0 3.2 - 4.9 g/dL    Total Protein 7.3 6.0 - 8.2 g/dL    Globulin 3.3 1.9 - 3.5 g/dL    A-G Ratio 1.2 g/dL   LIPASE   Result Value Ref Range    Lipase 107 (H) 11 - 82 U/L   TROPONIN   Result Value Ref Range    Troponin T 13 6 - 19 ng/L   ESTIMATED GFR   Result Value Ref Range    GFR If African American >60 >60 mL/min/1.73 m 2    GFR If Non African American >60 >60 mL/min/1.73 m 2   DIAGNOSTIC ALCOHOL   Result Value Ref Range    Diagnostic Alcohol 275.3 (H) 0.0 - 10.1 mg/dL     DX-SHOULDER 2+ LEFT   Final Result      No evidence of acute fracture or dislocation.      DX-HIP-UNILATERAL-WITH PELVIS-1 VIEW LEFT   Final Result      No evidence of fracture or arthropathy.            PROCEDURES     MEDICAL RECORD  I have reviewed patient's medical record and pertinent results are listed.    COURSE & MEDICAL DECISION MAKING  I have reviewed any medical record information, laboratory studies and radiographic results as noted above.    5:01 PM  The pt is comfortable, nontoxic appearing, and has no current pain.  She will be allowed to stay here, and sober up.     6:25 PM  The pt is nontoxic appearing, comfortable, and intoxicated.   Dr. Salmeron will assume care, and discharge the pt when she is sober or has a ride.  She has no abdominal pain,cp, or sob.      HYDRATION: Based on the patient's presentation of Dehydration the patient was given IV fluids. IV Hydration was used because oral hydration was not adequate alone. Upon recheck following hydration, the patient was improved.    If you have had any blood pressure issues while here in the emergency department, please see your doctor for a further evaluation or work up.    Differential diagnoses include but not limited to: etoh, sbo, shoulder dislocation/hip fracture.     This patient presents with alcohol abuse .  At this time, I have counseled the patient/family regarding their medications, pain control, and follow up.  They will continue their medications, if any, as prescribed.  They will return immediately for any worsening symptoms and/or any other medical concerns.  They will see their doctor, or contact the doctor provided, in 1-2 days for follow up.       FINAL IMPRESSION  Alcohol abuse      Electronically signed by: Jake Mayo D.O., 7/20/2020 1:26 PM

## 2020-07-20 NOTE — ED TRIAGE NOTES
Ems called by [facility] for pt pooping on the sidewalk. Ems states pt states she has diffuse abd cramping and loose stools. Pt skin pink warm and dry no acute resp distress. Pt admits to ETOH consumption.

## 2020-07-21 LAB
ANION GAP SERPL CALC-SCNC: 13 MMOL/L (ref 7–16)
APPEARANCE UR: CLEAR
BACTERIA #/AREA URNS HPF: NEGATIVE /HPF
BILIRUB UR QL STRIP.AUTO: NEGATIVE
BUN SERPL-MCNC: 10 MG/DL (ref 8–22)
CALCIUM SERPL-MCNC: 8.5 MG/DL (ref 8.5–10.5)
CHLORIDE SERPL-SCNC: 97 MMOL/L (ref 96–112)
CO2 SERPL-SCNC: 27 MMOL/L (ref 20–33)
COLOR UR: YELLOW
CREAT SERPL-MCNC: 0.62 MG/DL (ref 0.5–1.4)
EPI CELLS #/AREA URNS HPF: ABNORMAL /HPF
GLUCOSE SERPL-MCNC: 114 MG/DL (ref 65–99)
GLUCOSE UR STRIP.AUTO-MCNC: NEGATIVE MG/DL
HYALINE CASTS #/AREA URNS LPF: ABNORMAL /LPF
KETONES UR STRIP.AUTO-MCNC: ABNORMAL MG/DL
LEUKOCYTE ESTERASE UR QL STRIP.AUTO: ABNORMAL
MICRO URNS: ABNORMAL
NITRITE UR QL STRIP.AUTO: NEGATIVE
PH UR STRIP.AUTO: 7 [PH] (ref 5–8)
POTASSIUM SERPL-SCNC: 3.3 MMOL/L (ref 3.6–5.5)
PROT UR QL STRIP: NEGATIVE MG/DL
RBC # URNS HPF: ABNORMAL /HPF
RBC UR QL AUTO: ABNORMAL
SODIUM SERPL-SCNC: 137 MMOL/L (ref 135–145)
SP GR UR STRIP.AUTO: 1.02
UROBILINOGEN UR STRIP.AUTO-MCNC: 1 MG/DL
WBC #/AREA URNS HPF: ABNORMAL /HPF

## 2020-07-21 PROCEDURE — 96366 THER/PROPH/DIAG IV INF ADDON: CPT

## 2020-07-21 PROCEDURE — 700105 HCHG RX REV CODE 258: Performed by: INTERNAL MEDICINE

## 2020-07-21 PROCEDURE — 80048 BASIC METABOLIC PNL TOTAL CA: CPT

## 2020-07-21 PROCEDURE — 700111 HCHG RX REV CODE 636 W/ 250 OVERRIDE (IP): Performed by: INTERNAL MEDICINE

## 2020-07-21 PROCEDURE — A9270 NON-COVERED ITEM OR SERVICE: HCPCS | Performed by: NURSE PRACTITIONER

## 2020-07-21 PROCEDURE — A9270 NON-COVERED ITEM OR SERVICE: HCPCS | Performed by: HOSPITALIST

## 2020-07-21 PROCEDURE — 700102 HCHG RX REV CODE 250 W/ 637 OVERRIDE(OP): Performed by: NURSE PRACTITIONER

## 2020-07-21 PROCEDURE — 700102 HCHG RX REV CODE 250 W/ 637 OVERRIDE(OP): Performed by: INTERNAL MEDICINE

## 2020-07-21 PROCEDURE — A9270 NON-COVERED ITEM OR SERVICE: HCPCS | Performed by: INTERNAL MEDICINE

## 2020-07-21 PROCEDURE — 700102 HCHG RX REV CODE 250 W/ 637 OVERRIDE(OP): Performed by: HOSPITALIST

## 2020-07-21 PROCEDURE — 36415 COLL VENOUS BLD VENIPUNCTURE: CPT

## 2020-07-21 PROCEDURE — 700111 HCHG RX REV CODE 636 W/ 250 OVERRIDE (IP): Performed by: HOSPITALIST

## 2020-07-21 PROCEDURE — 770006 HCHG ROOM/CARE - MED/SURG/GYN SEMI*

## 2020-07-21 PROCEDURE — 81001 URINALYSIS AUTO W/SCOPE: CPT

## 2020-07-21 PROCEDURE — 700101 HCHG RX REV CODE 250: Performed by: INTERNAL MEDICINE

## 2020-07-21 PROCEDURE — 99232 SBSQ HOSP IP/OBS MODERATE 35: CPT | Performed by: HOSPITALIST

## 2020-07-21 RX ORDER — POTASSIUM CHLORIDE 20 MEQ/1
60 TABLET, EXTENDED RELEASE ORAL ONCE
Status: COMPLETED | OUTPATIENT
Start: 2020-07-21 | End: 2020-07-21

## 2020-07-21 RX ORDER — PHENAZOPYRIDINE HYDROCHLORIDE 200 MG/1
200 TABLET, FILM COATED ORAL
Status: COMPLETED | OUTPATIENT
Start: 2020-07-21 | End: 2020-07-23

## 2020-07-21 RX ORDER — SODIUM CHLORIDE 9 MG/ML
1000 INJECTION, SOLUTION INTRAVENOUS ONCE
Status: COMPLETED | OUTPATIENT
Start: 2020-07-21 | End: 2020-07-21

## 2020-07-21 RX ORDER — MAGNESIUM SULFATE HEPTAHYDRATE 40 MG/ML
2 INJECTION, SOLUTION INTRAVENOUS ONCE
Status: COMPLETED | OUTPATIENT
Start: 2020-07-21 | End: 2020-07-21

## 2020-07-21 RX ADMIN — QUETIAPINE FUMARATE 50 MG: 25 TABLET ORAL at 20:24

## 2020-07-21 RX ADMIN — Medication 100 MG: at 06:24

## 2020-07-21 RX ADMIN — FAMOTIDINE 20 MG: 10 INJECTION INTRAVENOUS at 18:45

## 2020-07-21 RX ADMIN — FAMOTIDINE 20 MG: 20 TABLET, FILM COATED ORAL at 01:29

## 2020-07-21 RX ADMIN — SUCRALFATE 1 G: 1 TABLET ORAL at 13:46

## 2020-07-21 RX ADMIN — SUCRALFATE 1 G: 1 TABLET ORAL at 06:27

## 2020-07-21 RX ADMIN — THERA TABS 1 TABLET: TAB at 06:24

## 2020-07-21 RX ADMIN — POTASSIUM CHLORIDE AND SODIUM CHLORIDE: 900; 300 INJECTION, SOLUTION INTRAVENOUS at 08:06

## 2020-07-21 RX ADMIN — METOPROLOL TARTRATE 25 MG: 25 TABLET, FILM COATED ORAL at 06:24

## 2020-07-21 RX ADMIN — DOXEPIN HYDROCHLORIDE 10 MG: 10 CAPSULE ORAL at 20:23

## 2020-07-21 RX ADMIN — METOPROLOL TARTRATE 25 MG: 25 TABLET, FILM COATED ORAL at 01:29

## 2020-07-21 RX ADMIN — MAGNESIUM SULFATE 2 G: 2 INJECTION INTRAVENOUS at 09:04

## 2020-07-21 RX ADMIN — FAMOTIDINE 20 MG: 20 TABLET, FILM COATED ORAL at 06:24

## 2020-07-21 RX ADMIN — OXYCODONE 5 MG: 5 TABLET ORAL at 17:26

## 2020-07-21 RX ADMIN — LORAZEPAM 1 MG: 1 TABLET ORAL at 10:38

## 2020-07-21 RX ADMIN — LORAZEPAM 0.5 MG: 1 TABLET ORAL at 14:47

## 2020-07-21 RX ADMIN — ONDANSETRON 4 MG: 2 INJECTION INTRAMUSCULAR; INTRAVENOUS at 08:05

## 2020-07-21 RX ADMIN — LORAZEPAM 1 MG: 1 TABLET ORAL at 19:18

## 2020-07-21 RX ADMIN — SODIUM CHLORIDE 1000 ML: 9 INJECTION, SOLUTION INTRAVENOUS at 20:24

## 2020-07-21 RX ADMIN — PHENAZOPYRIDINE HYDROCHLORIDE 200 MG: 200 TABLET ORAL at 22:20

## 2020-07-21 RX ADMIN — POTASSIUM CHLORIDE 20 MEQ: 1500 TABLET, EXTENDED RELEASE ORAL at 06:24

## 2020-07-21 RX ADMIN — DOXEPIN HYDROCHLORIDE 10 MG: 10 CAPSULE ORAL at 02:48

## 2020-07-21 RX ADMIN — SUCRALFATE 1 G: 1 TABLET ORAL at 17:31

## 2020-07-21 RX ADMIN — LORAZEPAM 1 MG: 1 TABLET ORAL at 01:29

## 2020-07-21 RX ADMIN — OXYCODONE HYDROCHLORIDE 10 MG: 10 TABLET ORAL at 20:31

## 2020-07-21 RX ADMIN — LORAZEPAM 1 MG: 1 TABLET ORAL at 06:38

## 2020-07-21 RX ADMIN — QUETIAPINE FUMARATE 50 MG: 25 TABLET ORAL at 01:29

## 2020-07-21 RX ADMIN — POTASSIUM CHLORIDE 60 MEQ: 1500 TABLET, EXTENDED RELEASE ORAL at 17:53

## 2020-07-21 RX ADMIN — ONDANSETRON 4 MG: 2 INJECTION INTRAMUSCULAR; INTRAVENOUS at 17:15

## 2020-07-21 RX ADMIN — FOLIC ACID 1 MG: 1 TABLET ORAL at 06:24

## 2020-07-21 RX ADMIN — LISINOPRIL 10 MG: 10 TABLET ORAL at 06:24

## 2020-07-21 ASSESSMENT — LIFESTYLE VARIABLES
AGITATION: NORMAL ACTIVITY
ORIENTATION AND CLOUDING OF SENSORIUM: ORIENTED AND CAN DO SERIAL ADDITIONS
HEADACHE, FULLNESS IN HEAD: MODERATE
AGITATION: NORMAL ACTIVITY
PAROXYSMAL SWEATS: *
VISUAL DISTURBANCES: NOT PRESENT
TREMOR: *
AUDITORY DISTURBANCES: NOT PRESENT
TOTAL SCORE: 4
ORIENTATION AND CLOUDING OF SENSORIUM: ORIENTED AND CAN DO SERIAL ADDITIONS
ANXIETY: *
TOTAL SCORE: 7
NAUSEA AND VOMITING: NO NAUSEA AND NO VOMITING
AUDITORY DISTURBANCES: NOT PRESENT
PAROXYSMAL SWEATS: NO SWEAT VISIBLE
HEADACHE, FULLNESS IN HEAD: VERY MILD
ORIENTATION AND CLOUDING OF SENSORIUM: ORIENTED AND CAN DO SERIAL ADDITIONS
ORIENTATION AND CLOUDING OF SENSORIUM: ORIENTED AND CAN DO SERIAL ADDITIONS
HEADACHE, FULLNESS IN HEAD: MILD
VISUAL DISTURBANCES: NOT PRESENT
TOTAL SCORE: 8
AGITATION: NORMAL ACTIVITY
AUDITORY DISTURBANCES: NOT PRESENT
VISUAL DISTURBANCES: NOT PRESENT
ANXIETY: MILDLY ANXIOUS
AGITATION: NORMAL ACTIVITY
AUDITORY DISTURBANCES: NOT PRESENT
PAROXYSMAL SWEATS: BARELY PERCEPTIBLE SWEATING. PALMS MOIST
HEADACHE, FULLNESS IN HEAD: MILD
VISUAL DISTURBANCES: NOT PRESENT
NAUSEA AND VOMITING: *
ANXIETY: *
NAUSEA AND VOMITING: NO NAUSEA AND NO VOMITING
NAUSEA AND VOMITING: NO NAUSEA AND NO VOMITING
AUDITORY DISTURBANCES: NOT PRESENT
PAROXYSMAL SWEATS: *
VISUAL DISTURBANCES: NOT PRESENT
AGITATION: SOMEWHAT MORE THAN NORMAL ACTIVITY
ANXIETY: MILDLY ANXIOUS
NAUSEA AND VOMITING: NO NAUSEA AND NO VOMITING
TREMOR: *
PAROXYSMAL SWEATS: NO SWEAT VISIBLE
AUDITORY DISTURBANCES: NOT PRESENT
ANXIETY: MILDLY ANXIOUS
ORIENTATION AND CLOUDING OF SENSORIUM: ORIENTED AND CAN DO SERIAL ADDITIONS
HEADACHE, FULLNESS IN HEAD: MILD
TREMOR: TREMOR NOT VISIBLE BUT CAN BE FELT, FINGERTIP TO FINGERTIP
TOTAL SCORE: 9
ANXIETY: *
HEADACHE, FULLNESS IN HEAD: MILD
AGITATION: NORMAL ACTIVITY
ORIENTATION AND CLOUDING OF SENSORIUM: ORIENTED AND CAN DO SERIAL ADDITIONS
AGITATION: NORMAL ACTIVITY
NAUSEA AND VOMITING: NO NAUSEA AND NO VOMITING
TOTAL SCORE: 9
ORIENTATION AND CLOUDING OF SENSORIUM: ORIENTED AND CAN DO SERIAL ADDITIONS
VISUAL DISTURBANCES: NOT PRESENT
NAUSEA AND VOMITING: NO NAUSEA AND NO VOMITING
TOTAL SCORE: 7
TREMOR: *
ANXIETY: MILDLY ANXIOUS
TOTAL SCORE: 9
AUDITORY DISTURBANCES: NOT PRESENT
ANXIETY: *
TREMOR: MODERATE TREMOR WITH ARMS EXTENDED
ORIENTATION AND CLOUDING OF SENSORIUM: ORIENTED AND CAN DO SERIAL ADDITIONS
PAROXYSMAL SWEATS: *
TREMOR: TREMOR NOT VISIBLE BUT CAN BE FELT, FINGERTIP TO FINGERTIP
PAROXYSMAL SWEATS: BARELY PERCEPTIBLE SWEATING. PALMS MOIST
AGITATION: NORMAL ACTIVITY
SUBSTANCE_ABUSE: 1
HEADACHE, FULLNESS IN HEAD: MILD
TREMOR: MODERATE TREMOR WITH ARMS EXTENDED
PAROXYSMAL SWEATS: NO SWEAT VISIBLE
TOTAL SCORE: 10
TREMOR: MODERATE TREMOR WITH ARMS EXTENDED
VISUAL DISTURBANCES: NOT PRESENT
VISUAL DISTURBANCES: NOT PRESENT
NAUSEA AND VOMITING: MILD NAUSEA WITH NO VOMITING
HEADACHE, FULLNESS IN HEAD: MODERATE
AUDITORY DISTURBANCES: NOT PRESENT

## 2020-07-21 ASSESSMENT — ENCOUNTER SYMPTOMS
NEUROLOGICAL NEGATIVE: 1
FOCAL WEAKNESS: 0
MYALGIAS: 0
COUGH: 0
CONSTITUTIONAL NEGATIVE: 1
DIAPHORESIS: 0
DIZZINESS: 0
DEPRESSION: 0
SHORTNESS OF BREATH: 0
BLURRED VISION: 0
VOMITING: 0
MUSCULOSKELETAL NEGATIVE: 1
WEIGHT LOSS: 0
PALPITATIONS: 0
SORE THROAT: 0
HEADACHES: 0
RESPIRATORY NEGATIVE: 1
ABDOMINAL PAIN: 1
FEVER: 0
CHILLS: 0
NAUSEA: 0
CARDIOVASCULAR NEGATIVE: 1
BRUISES/BLEEDS EASILY: 0
WEAKNESS: 0
EYES NEGATIVE: 1

## 2020-07-21 ASSESSMENT — FIBROSIS 4 INDEX: FIB4 SCORE: 5.36

## 2020-07-21 NOTE — PROGRESS NOTES
[facility] Medicine Daily Progress Note    Date of Service  7/21/2020    Chief Complaint  59 y.o. female admitted 7/20/2020 with abdominal pain and nausea    Hospital Course    Patient is a 59 y.o. female with history of alcohol abuse and dependence, GERD, CHF, depression, hypertension, PTSD, alcohol withdrawal seizure who presented 7/20/2020 with complaints of abdominal pain, nausea.  Patient has been frequenting to emergency department with alcohol intoxication.  Her lipase was elevated.      Interval Problem Update  She states she is feeling much better today, abdominal pain has nearly resolved 1/10 and she would like to advance her diet as doing well with clears  No further nausea  CIWA 8, mild tremor  No confusion  No cp  We discussed etoh cessation and strategies  axox3  Ros otherwise negative    Consultants/Specialty      Code Status  Full code    Disposition  tbd    Review of Systems  Review of Systems   Constitutional: Negative.  Negative for chills, diaphoresis, fever, malaise/fatigue and weight loss.   HENT: Negative.  Negative for sore throat.    Eyes: Negative.  Negative for blurred vision.   Respiratory: Negative.  Negative for cough and shortness of breath.    Cardiovascular: Negative.  Negative for chest pain, palpitations and leg swelling.   Gastrointestinal: Positive for abdominal pain. Negative for nausea and vomiting.   Genitourinary: Negative.  Negative for dysuria.   Musculoskeletal: Negative.  Negative for myalgias.   Skin: Negative.  Negative for itching and rash.   Neurological: Negative.  Negative for dizziness, focal weakness, weakness and headaches.   Endo/Heme/Allergies: Negative.  Does not bruise/bleed easily.   Psychiatric/Behavioral: Positive for substance abuse. Negative for depression and suicidal ideas.   All other systems reviewed and are negative.       Physical Exam  Temp:  [36.3 °C (97.3 °F)-37.2 °C (98.9 °F)] 36.3 °C (97.3 °F)  Pulse:  [] 90  Resp:  [16-18] 16  BP:  ()/(58-95) 110/58  SpO2:  [95 %-97 %] 95 %    Physical Exam  Vitals signs and nursing note reviewed. Exam conducted with a chaperone present.   Constitutional:       General: She is not in acute distress.     Appearance: Normal appearance. She is not diaphoretic.   HENT:      Head: Normocephalic.      Nose: Nose normal.      Mouth/Throat:      Mouth: Mucous membranes are moist.   Eyes:      Pupils: Pupils are equal, round, and reactive to light.   Cardiovascular:      Rate and Rhythm: Normal rate and regular rhythm.      Pulses: Normal pulses.      Heart sounds: Normal heart sounds.   Pulmonary:      Effort: Pulmonary effort is normal.      Breath sounds: Normal breath sounds.   Abdominal:      General: Abdomen is flat. Bowel sounds are normal.      Palpations: Abdomen is soft.   Musculoskeletal: Normal range of motion.         General: No swelling or deformity.   Skin:     General: Skin is warm and dry.      Capillary Refill: Capillary refill takes less than 2 seconds.   Neurological:      General: No focal deficit present.      Mental Status: She is alert and oriented to person, place, and time.      Cranial Nerves: No cranial nerve deficit.      Comments: Mild tremor   Psychiatric:         Mood and Affect: Mood normal.         Behavior: Behavior normal.         Fluids    Intake/Output Summary (Last 24 hours) at 7/21/2020 1651  Last data filed at 7/21/2020 1500  Gross per 24 hour   Intake 1690 ml   Output --   Net 1690 ml       Laboratory      Recent Labs     07/20/20  1400 07/21/20  0944   SODIUM 143 137   POTASSIUM 3.2* 3.3*   CHLORIDE 98 97   CO2 23 27   GLUCOSE 58* 114*   BUN 12 10   CREATININE 0.71 0.62   CALCIUM 9.0 8.5                   Imaging  DX-SHOULDER 2+ LEFT   Final Result      No evidence of acute fracture or dislocation.      DX-HIP-UNILATERAL-WITH PELVIS-1 VIEW LEFT   Final Result      No evidence of fracture or arthropathy.           Assessment/Plan  Hypokalemia- (present on  admission)  Assessment & Plan  Continue replacement  Also replace mag    Alcohol withdrawal (HCC)- (present on admission)  Assessment & Plan  History of alcohol withdrawal seizure  continue CIWA protocol  Thiamine supplementation  Alcohol cessation counseling provided    Pancreatitis  Assessment & Plan  Recurrent alcoholic pancreatitis  Supportive care  Advancing diet  Sx resolving  etoh cessation discussed and recommended as this is the etiology       VTE prophylaxis: scd

## 2020-07-21 NOTE — ED NOTES
Pt does not have tele orders. Spoke with Dr. Palm regarding orders, he states that she does not need them. Bo IRENE notified.

## 2020-07-21 NOTE — ASSESSMENT & PLAN NOTE
Recurrent alcoholic pancreatitis  Supportive care  Tolerating diet  Sx resolved  etoh cessation discussed and recommended as this is the etiology

## 2020-07-21 NOTE — ED NOTES
Med rec updated and complete. Allergies reviewed.  Pt stated that she has not taken any medications in  A very long  Time.  Pt reports that she has only been drinking for a long time.    Pt should be taking doxepin 10 mg qhs  HCTZ 25 MG daily  lisinopril 10 mg daily  Metoprolol 25 mg bid  Potassium 20 meq daily  Quetiapine 50 mg qhs

## 2020-07-21 NOTE — PROGRESS NOTES
2RN skin check: scabbing on RUE and LLE. Generalized bruising on BUE/BLE. Calloused feet. Sacrum intact, blanching.

## 2020-07-21 NOTE — H&P
Hospital Medicine History & Physical Note    Date of Service  7/20/2020    Primary Care Physician  CACHORRO Anthony    Code Status  Full Code    Chief Complaint  Chief Complaint   Patient presents with   • Abdominal Pain   • Alcohol Intoxication   • Loose Stools       History of Presenting Illness  59 y.o. female with history of alcohol abuse and dependence, GERD, CHF, depression, hypertension, PTSD, alcohol withdrawal seizure who presented 7/20/2020 with complaints of abdominal pain, nausea.  Patient has been frequenting to emergency department with alcohol intoxication, last presentation yesterday.  Abdominal pain is diffuse, but mostly in the upper left part of the abdomen.  It is dull, constant, associated with nausea, worsened with oral intake.  She states that she may have fallen to the left side and has been complaining of left hip pain and some left shoulder pain..  X-ray of left shoulder and left hip negative for fracture.  She noted to develop tremor in the emergency department.  Her last drink was this morning.  She drinks various amount of alcohol.  Her alcohol level was 275 at 2 PM.  Lipase is elevated at 107.  Review of Systems  Review of Systems   Constitutional: Negative for chills, fever and weight loss.   HENT: Negative for ear pain, hearing loss and tinnitus.    Eyes: Negative for blurred vision, double vision and photophobia.   Respiratory: Negative for cough, hemoptysis and sputum production.    Cardiovascular: Negative for chest pain, palpitations and orthopnea.   Gastrointestinal: Positive for abdominal pain and nausea. Negative for diarrhea, heartburn and vomiting.   Genitourinary: Negative for dysuria, flank pain, frequency and hematuria.   Musculoskeletal: Positive for joint pain. Negative for back pain and neck pain.   Skin: Negative for itching and rash.   Neurological: Positive for tremors and headaches. Negative for speech change and focal weakness.   Endo/Heme/Allergies:  Negative for environmental allergies and polydipsia. Does not bruise/bleed easily.   Psychiatric/Behavioral: Negative for hallucinations and substance abuse. The patient is not nervous/anxious.        Past Medical History   has a past medical history of Alcoholism ([facility]), Anxiety, Arthritis, ASTHMA, Cancer ([facility]) (1981), Chronic low back pain, Congestive heart failure ([facility]), Depression, EtOH dependence ([facility]), Fall, GERD (gastroesophageal reflux disease), HTN, Hypertension, Indigestion, Muscle disorder, OSTEOPOROSIS, Other specified symptom associated with female genital organs, Psychiatric disorder, PTSD (post-traumatic stress disorder), Renal disorder, Seizure ([facility]), Ulcer, and Vitamin D deficiency.    Surgical History   has a past surgical history that includes hernia repair (1977); cysto stent placemnt pre surg (10/7/2010); cystoscopy stent placement (11/9/2010); ureteroscopy (11/9/2010); lasertripsy (11/9/2010); stent removal (11/9/2010); and gastroscopy-endo (N/A, 10/3/2018).     Family History  family history includes Cancer in her paternal grandmother; Depression in an other family member; Diabetes in her father; Heart Disease in her father; Hypertension in her father.     Social History   reports that she has been smoking cigarettes. She has a 10.00 pack-year smoking history. She has never used smokeless tobacco. She reports current alcohol use. She reports that she does not use drugs.    Allergies  Allergies   Allergen Reactions   • Sulfa Drugs Vomiting   • Toradol Vomiting       Medications  Prior to Admission Medications   Prescriptions Last Dose Informant Patient Reported? Taking?   Magnesium 400 MG Tab   No No   Sig: Take 400 mg by mouth every day.   QUEtiapine (SEROQUEL) 25 MG Tab   Yes No   Sig: Take 50 mg by mouth every bedtime.   acetaminophen (TYLENOL) 325 MG Tab   No No   Sig: Take 2 Tabs by mouth every four hours as needed.   albuterol 108 (90 Base) MCG/ACT Aero Soln inhalation aerosol   No No    Sig: Inhale 2 Puffs by mouth every four hours as needed for Shortness of Breath.   doxepin (SINEQUAN) 10 MG Cap   Yes No   Sig: Take 10 mg by mouth every evening.   hydroCHLOROthiazide (HYDRODIURIL) 25 MG Tab   Yes No   hydroCHLOROthiazide (HYDRODIURIL) 25 MG Tab   No No   Sig: Take 1 Tab by mouth every day.   lisinopril (PRINIVIL) 10 MG Tab   Yes No   Sig: Take 10 mg by mouth every day.   loperamide (IMODIUM) 2 MG Cap   No No   Sig: Take 1 Cap by mouth 4 times a day as needed for Diarrhea.   Patient not taking: Reported on 3/26/2020   metoprolol (LOPRESSOR) 25 MG Tab   Yes No   Sig: Take 25 mg by mouth 2 times a day.   potassium chloride SA (KDUR) 20 MEQ Tab CR   No No   Sig: Take 1 Tab by mouth every day.      Facility-Administered Medications: None       Physical Exam  Temp:  [37.1 °C (98.7 °F)-37.1 °C (98.8 °F)] 37.1 °C (98.7 °F)  Pulse:  [] 120  Resp:  [16-18] 16  BP: (125-133)/(59-79) 125/59  SpO2:  [96 %-97 %] 96 %    Physical Exam  Vitals signs and nursing note reviewed.   Constitutional:       General: She is not in acute distress.     Comments: Disheveled   HENT:      Head: Normocephalic and atraumatic.      Nose: Nose normal.      Mouth/Throat:      Mouth: Mucous membranes are moist.   Eyes:      Extraocular Movements: Extraocular movements intact.      Pupils: Pupils are equal, round, and reactive to light.   Neck:      Musculoskeletal: Normal range of motion and neck supple.   Cardiovascular:      Rate and Rhythm: Normal rate and regular rhythm.   Pulmonary:      Effort: Pulmonary effort is normal.      Breath sounds: Normal breath sounds.   Abdominal:      General: Abdomen is flat. There is no distension.      Tenderness: There is abdominal tenderness in the left upper quadrant. There is no guarding or rebound.   Musculoskeletal: Normal range of motion.         General: No swelling or deformity.   Skin:     General: Skin is warm and dry.   Neurological:      General: No focal deficit present.       Mental Status: She is alert and oriented to person, place, and time.      Motor: Tremor present.   Psychiatric:         Mood and Affect: Mood normal.         Behavior: Behavior normal.         Laboratory:      Recent Labs     07/20/20  1400   SODIUM 143   POTASSIUM 3.2*   CHLORIDE 98   CO2 23   GLUCOSE 58*   BUN 12   CREATININE 0.71   CALCIUM 9.0     Recent Labs     07/20/20  1400   ALTSGPT 57*   ASTSGOT 174*   ALKPHOSPHAT 230*   TBILIRUBIN 1.0   LIPASE 107*   GLUCOSE 58*         No results for input(s): NTPROBNP in the last 72 hours.      Recent Labs     07/20/20  1400   TROPONINT 13       Imaging:  DX-SHOULDER 2+ LEFT   Final Result      No evidence of acute fracture or dislocation.      DX-HIP-UNILATERAL-WITH PELVIS-1 VIEW LEFT   Final Result      No evidence of fracture or arthropathy.            Assessment/Plan:      Hypokalemia- (present on admission)  Assessment & Plan  Replaced    Alcohol withdrawal (HCC)- (present on admission)  Assessment & Plan  History of alcohol withdrawal seizure  Monitor closely with [facility] protocol  Thiamine supplementation  Alcohol cessation counseling    Pancreatitis  Assessment & Plan  Recurrent alcoholic pancreatitis  Supportive care  Clear liquid diet  Alcohol cessation counseling

## 2020-07-21 NOTE — ASSESSMENT & PLAN NOTE
History of alcohol withdrawal seizure  continue CIWA protocol  Thiamine supplementation  Alcohol cessation counseling provided  Sx improving  Cessation discussed and recommended  PT/ OT to miguelangel

## 2020-07-22 ENCOUNTER — PATIENT OUTREACH (OUTPATIENT)
Dept: HEALTH INFORMATION MANAGEMENT | Facility: OTHER | Age: 60
End: 2020-07-22

## 2020-07-22 PROBLEM — D61.818 PANCYTOPENIA (HCC): Status: ACTIVE | Noted: 2020-07-22

## 2020-07-22 LAB
ALBUMIN SERPL BCP-MCNC: 3.1 G/DL (ref 3.2–4.9)
ALBUMIN/GLOB SERPL: 1.1 G/DL
ALP SERPL-CCNC: 166 U/L (ref 30–99)
ALT SERPL-CCNC: 45 U/L (ref 2–50)
ANION GAP SERPL CALC-SCNC: 10 MMOL/L (ref 7–16)
AST SERPL-CCNC: 111 U/L (ref 12–45)
BASOPHILS # BLD AUTO: 0.7 % (ref 0–1.8)
BASOPHILS # BLD: 0.02 K/UL (ref 0–0.12)
BILIRUB SERPL-MCNC: 0.8 MG/DL (ref 0.1–1.5)
BUN SERPL-MCNC: 9 MG/DL (ref 8–22)
CALCIUM SERPL-MCNC: 8.8 MG/DL (ref 8.5–10.5)
CHLORIDE SERPL-SCNC: 105 MMOL/L (ref 96–112)
CO2 SERPL-SCNC: 24 MMOL/L (ref 20–33)
CREAT SERPL-MCNC: 0.68 MG/DL (ref 0.5–1.4)
EOSINOPHIL # BLD AUTO: 0.06 K/UL (ref 0–0.51)
EOSINOPHIL NFR BLD: 2.1 % (ref 0–6.9)
ERYTHROCYTE [DISTWIDTH] IN BLOOD BY AUTOMATED COUNT: 65.8 FL (ref 35.9–50)
GLOBULIN SER CALC-MCNC: 2.7 G/DL (ref 1.9–3.5)
GLUCOSE SERPL-MCNC: 102 MG/DL (ref 65–99)
HCT VFR BLD AUTO: 32.5 % (ref 37–47)
HGB BLD-MCNC: 10.4 G/DL (ref 12–16)
IMM GRANULOCYTES # BLD AUTO: 0.01 K/UL (ref 0–0.11)
IMM GRANULOCYTES NFR BLD AUTO: 0.3 % (ref 0–0.9)
LYMPHOCYTES # BLD AUTO: 1.02 K/UL (ref 1–4.8)
LYMPHOCYTES NFR BLD: 35.7 % (ref 22–41)
MAGNESIUM SERPL-MCNC: 1.3 MG/DL (ref 1.5–2.5)
MCH RBC QN AUTO: 32.2 PG (ref 27–33)
MCHC RBC AUTO-ENTMCNC: 32 G/DL (ref 33.6–35)
MCV RBC AUTO: 100.6 FL (ref 81.4–97.8)
MONOCYTES # BLD AUTO: 0.35 K/UL (ref 0–0.85)
MONOCYTES NFR BLD AUTO: 12.2 % (ref 0–13.4)
NEUTROPHILS # BLD AUTO: 1.4 K/UL (ref 2–7.15)
NEUTROPHILS NFR BLD: 49 % (ref 44–72)
NRBC # BLD AUTO: 0 K/UL
NRBC BLD-RTO: 0 /100 WBC
PHOSPHATE SERPL-MCNC: 1.2 MG/DL (ref 2.5–4.5)
PLATELET # BLD AUTO: 57 K/UL (ref 164–446)
PMV BLD AUTO: 10.7 FL (ref 9–12.9)
POTASSIUM SERPL-SCNC: 4.2 MMOL/L (ref 3.6–5.5)
PROT SERPL-MCNC: 5.8 G/DL (ref 6–8.2)
RBC # BLD AUTO: 3.23 M/UL (ref 4.2–5.4)
SODIUM SERPL-SCNC: 139 MMOL/L (ref 135–145)
WBC # BLD AUTO: 2.9 K/UL (ref 4.8–10.8)

## 2020-07-22 PROCEDURE — 700102 HCHG RX REV CODE 250 W/ 637 OVERRIDE(OP): Performed by: NURSE PRACTITIONER

## 2020-07-22 PROCEDURE — 84100 ASSAY OF PHOSPHORUS: CPT

## 2020-07-22 PROCEDURE — A9270 NON-COVERED ITEM OR SERVICE: HCPCS | Performed by: NURSE PRACTITIONER

## 2020-07-22 PROCEDURE — A9270 NON-COVERED ITEM OR SERVICE: HCPCS | Performed by: HOSPITALIST

## 2020-07-22 PROCEDURE — 770006 HCHG ROOM/CARE - MED/SURG/GYN SEMI*

## 2020-07-22 PROCEDURE — 83735 ASSAY OF MAGNESIUM: CPT

## 2020-07-22 PROCEDURE — 700105 HCHG RX REV CODE 258: Performed by: HOSPITALIST

## 2020-07-22 PROCEDURE — 36415 COLL VENOUS BLD VENIPUNCTURE: CPT

## 2020-07-22 PROCEDURE — 700111 HCHG RX REV CODE 636 W/ 250 OVERRIDE (IP): Performed by: HOSPITALIST

## 2020-07-22 PROCEDURE — 85025 COMPLETE CBC W/AUTO DIFF WBC: CPT

## 2020-07-22 PROCEDURE — 700102 HCHG RX REV CODE 250 W/ 637 OVERRIDE(OP): Performed by: HOSPITALIST

## 2020-07-22 PROCEDURE — A9270 NON-COVERED ITEM OR SERVICE: HCPCS | Performed by: EMERGENCY MEDICINE

## 2020-07-22 PROCEDURE — 700102 HCHG RX REV CODE 250 W/ 637 OVERRIDE(OP): Performed by: EMERGENCY MEDICINE

## 2020-07-22 PROCEDURE — 700102 HCHG RX REV CODE 250 W/ 637 OVERRIDE(OP): Performed by: INTERNAL MEDICINE

## 2020-07-22 PROCEDURE — A9270 NON-COVERED ITEM OR SERVICE: HCPCS | Performed by: INTERNAL MEDICINE

## 2020-07-22 PROCEDURE — 99232 SBSQ HOSP IP/OBS MODERATE 35: CPT | Performed by: HOSPITALIST

## 2020-07-22 PROCEDURE — 80053 COMPREHEN METABOLIC PANEL: CPT

## 2020-07-22 RX ORDER — OXYCODONE HYDROCHLORIDE 5 MG/1
5 TABLET ORAL EVERY 6 HOURS PRN
Status: DISCONTINUED | OUTPATIENT
Start: 2020-07-22 | End: 2020-07-23

## 2020-07-22 RX ADMIN — LORAZEPAM 1 MG: 1 TABLET ORAL at 14:49

## 2020-07-22 RX ADMIN — SUCRALFATE 1 G: 1 TABLET ORAL at 00:12

## 2020-07-22 RX ADMIN — SUCRALFATE 1 G: 1 TABLET ORAL at 17:57

## 2020-07-22 RX ADMIN — PHENAZOPYRIDINE HYDROCHLORIDE 200 MG: 200 TABLET ORAL at 09:30

## 2020-07-22 RX ADMIN — METOPROLOL TARTRATE 25 MG: 25 TABLET, FILM COATED ORAL at 05:33

## 2020-07-22 RX ADMIN — QUETIAPINE FUMARATE 50 MG: 25 TABLET ORAL at 19:48

## 2020-07-22 RX ADMIN — FAMOTIDINE 20 MG: 20 TABLET, FILM COATED ORAL at 05:33

## 2020-07-22 RX ADMIN — LORAZEPAM 1 MG: 1 TABLET ORAL at 19:48

## 2020-07-22 RX ADMIN — CEFTRIAXONE SODIUM 1 G: 1 INJECTION, POWDER, FOR SOLUTION INTRAMUSCULAR; INTRAVENOUS at 09:30

## 2020-07-22 RX ADMIN — SUCRALFATE 1 G: 1 TABLET ORAL at 13:30

## 2020-07-22 RX ADMIN — PHENAZOPYRIDINE HYDROCHLORIDE 200 MG: 200 TABLET ORAL at 17:56

## 2020-07-22 RX ADMIN — LORAZEPAM 0.5 MG: 1 TABLET ORAL at 00:19

## 2020-07-22 RX ADMIN — DOCUSATE SODIUM 50 MG AND SENNOSIDES 8.6 MG 2 TABLET: 8.6; 5 TABLET, FILM COATED ORAL at 17:54

## 2020-07-22 RX ADMIN — DOXEPIN HYDROCHLORIDE 10 MG: 10 CAPSULE ORAL at 19:49

## 2020-07-22 RX ADMIN — LORAZEPAM 1 MG: 1 TABLET ORAL at 05:34

## 2020-07-22 RX ADMIN — SUCRALFATE 1 G: 1 TABLET ORAL at 05:41

## 2020-07-22 RX ADMIN — PHENAZOPYRIDINE HYDROCHLORIDE 200 MG: 200 TABLET ORAL at 13:30

## 2020-07-22 RX ADMIN — OXYCODONE 5 MG: 5 TABLET ORAL at 14:49

## 2020-07-22 RX ADMIN — LISINOPRIL 10 MG: 10 TABLET ORAL at 05:33

## 2020-07-22 RX ADMIN — Medication 100 MG: at 05:33

## 2020-07-22 RX ADMIN — FAMOTIDINE 20 MG: 20 TABLET, FILM COATED ORAL at 17:55

## 2020-07-22 RX ADMIN — METOPROLOL TARTRATE 25 MG: 25 TABLET, FILM COATED ORAL at 17:53

## 2020-07-22 RX ADMIN — FOLIC ACID 1 MG: 1 TABLET ORAL at 05:33

## 2020-07-22 RX ADMIN — SUCRALFATE 1 G: 1 TABLET ORAL at 23:16

## 2020-07-22 RX ADMIN — OXYCODONE 5 MG: 5 TABLET ORAL at 21:24

## 2020-07-22 RX ADMIN — POTASSIUM CHLORIDE 20 MEQ: 1500 TABLET, EXTENDED RELEASE ORAL at 05:41

## 2020-07-22 RX ADMIN — LORAZEPAM 1 MG: 1 TABLET ORAL at 10:19

## 2020-07-22 RX ADMIN — THERA TABS 1 TABLET: TAB at 05:33

## 2020-07-22 ASSESSMENT — ENCOUNTER SYMPTOMS
CARDIOVASCULAR NEGATIVE: 1
CHILLS: 0
DIZZINESS: 0
BRUISES/BLEEDS EASILY: 0
FEVER: 0
WEAKNESS: 0
DIAPHORESIS: 0
RESPIRATORY NEGATIVE: 1
PALPITATIONS: 0
BLURRED VISION: 0
NAUSEA: 0
CONSTITUTIONAL NEGATIVE: 1
WEIGHT LOSS: 0
DEPRESSION: 0
HEADACHES: 0
ABDOMINAL PAIN: 1
SORE THROAT: 0
EYES NEGATIVE: 1
VOMITING: 0
COUGH: 0
NEUROLOGICAL NEGATIVE: 1
SHORTNESS OF BREATH: 0
MYALGIAS: 0
FOCAL WEAKNESS: 0
MUSCULOSKELETAL NEGATIVE: 1

## 2020-07-22 ASSESSMENT — LIFESTYLE VARIABLES
TOTAL SCORE: 8
VISUAL DISTURBANCES: NOT PRESENT
ORIENTATION AND CLOUDING OF SENSORIUM: ORIENTED AND CAN DO SERIAL ADDITIONS
NAUSEA AND VOMITING: MILD NAUSEA WITH NO VOMITING
ANXIETY: MILDLY ANXIOUS
PAROXYSMAL SWEATS: NO SWEAT VISIBLE
HEADACHE, FULLNESS IN HEAD: MODERATE
HEADACHE, FULLNESS IN HEAD: MODERATE
TREMOR: *
AUDITORY DISTURBANCES: NOT PRESENT
HEADACHE, FULLNESS IN HEAD: MODERATE
ORIENTATION AND CLOUDING OF SENSORIUM: ORIENTED AND CAN DO SERIAL ADDITIONS
AGITATION: NORMAL ACTIVITY
TOTAL SCORE: 7
AGITATION: NORMAL ACTIVITY
ANXIETY: *
VISUAL DISTURBANCES: NOT PRESENT
ORIENTATION AND CLOUDING OF SENSORIUM: ORIENTED AND CAN DO SERIAL ADDITIONS
TOTAL SCORE: MILD ITCHING, PINS AND NEEDLES SENSATION, BURNING OR NUMBNESS
NAUSEA AND VOMITING: NO NAUSEA AND NO VOMITING
TREMOR: *
SUBSTANCE_ABUSE: 1
PAROXYSMAL SWEATS: NO SWEAT VISIBLE
ANXIETY: MILDLY ANXIOUS
AUDITORY DISTURBANCES: NOT PRESENT
ORIENTATION AND CLOUDING OF SENSORIUM: ORIENTED AND CAN DO SERIAL ADDITIONS
TREMOR: *
TOTAL SCORE: 9
AUDITORY DISTURBANCES: NOT PRESENT
TOTAL SCORE: MILD ITCHING, PINS AND NEEDLES SENSATION, BURNING OR NUMBNESS
PAROXYSMAL SWEATS: NO SWEAT VISIBLE
ANXIETY: MILDLY ANXIOUS
AGITATION: NORMAL ACTIVITY
PAROXYSMAL SWEATS: NO SWEAT VISIBLE
NAUSEA AND VOMITING: NO NAUSEA AND NO VOMITING
NAUSEA AND VOMITING: NO NAUSEA AND NO VOMITING
PAROXYSMAL SWEATS: NO SWEAT VISIBLE
HEADACHE, FULLNESS IN HEAD: MODERATE
TREMOR: MODERATE TREMOR WITH ARMS EXTENDED
AGITATION: NORMAL ACTIVITY
TOTAL SCORE: 8
ANXIETY: MILDLY ANXIOUS
TOTAL SCORE: 8
NAUSEA AND VOMITING: NO NAUSEA AND NO VOMITING
VISUAL DISTURBANCES: NOT PRESENT
HEADACHE, FULLNESS IN HEAD: MODERATELY SEVERE
ORIENTATION AND CLOUDING OF SENSORIUM: ORIENTED AND CAN DO SERIAL ADDITIONS
AUDITORY DISTURBANCES: NOT PRESENT
AUDITORY DISTURBANCES: NOT PRESENT
VISUAL DISTURBANCES: NOT PRESENT
VISUAL DISTURBANCES: NOT PRESENT
AGITATION: NORMAL ACTIVITY
TREMOR: *

## 2020-07-22 ASSESSMENT — COGNITIVE AND FUNCTIONAL STATUS - GENERAL
TOILETING: A LITTLE
HELP NEEDED FOR BATHING: A LITTLE
CLIMB 3 TO 5 STEPS WITH RAILING: A LITTLE
SUGGESTED CMS G CODE MODIFIER MOBILITY: CJ
MOBILITY SCORE: 21
SUGGESTED CMS G CODE MODIFIER DAILY ACTIVITY: CJ
STANDING UP FROM CHAIR USING ARMS: A LITTLE
WALKING IN HOSPITAL ROOM: A LITTLE
DAILY ACTIVITIY SCORE: 22

## 2020-07-22 NOTE — PROGRESS NOTES
[facility] Medicine Daily Progress Note    Date of Service  7/22/2020    Chief Complaint  59 y.o. female admitted 7/20/2020 with abdominal pain and nausea    Hospital Course    Patient is a 59 y.o. female with history of alcohol abuse and dependence, GERD, CHF, depression, hypertension, PTSD, alcohol withdrawal seizure who presented 7/20/2020 with complaints of abdominal pain, nausea.  Patient has been frequenting to emergency department with alcohol intoxication.  Her lipase was elevated.      Interval Problem Update  She reports a mild h/a 1/10 , no photophobia, no neck stiffness  No signs of bleeding  No abdominal pain today, tolerating diet  No nausea or vomiting  Mild tremor  She reports some dizziness, will check orthostatics    Consultants/Specialty    Code Status  Full code    Disposition  tbd    Review of Systems  Review of Systems   Constitutional: Negative.  Negative for chills, diaphoresis, fever, malaise/fatigue and weight loss.   HENT: Negative.  Negative for sore throat.    Eyes: Negative.  Negative for blurred vision.   Respiratory: Negative.  Negative for cough and shortness of breath.    Cardiovascular: Negative.  Negative for chest pain, palpitations and leg swelling.   Gastrointestinal: Positive for abdominal pain. Negative for nausea and vomiting.   Genitourinary: Negative.  Negative for dysuria.   Musculoskeletal: Negative.  Negative for myalgias.   Skin: Negative.  Negative for itching and rash.   Neurological: Negative.  Negative for dizziness, focal weakness, weakness and headaches.   Endo/Heme/Allergies: Negative.  Does not bruise/bleed easily.   Psychiatric/Behavioral: Positive for substance abuse. Negative for depression and suicidal ideas.   All other systems reviewed and are negative.       Physical Exam  Temp:  [36.3 °C (97.3 °F)-36.6 °C (97.9 °F)] 36.3 °C (97.3 °F)  Pulse:  [72-98] 72  Resp:  [16-18] 18  BP: ()/(58-88) 119/77  SpO2:  [90 %-96 %] 93 %    Physical Exam  Vitals signs  and nursing note reviewed. Exam conducted with a chaperone present.   Constitutional:       General: She is not in acute distress.     Appearance: Normal appearance. She is not diaphoretic.   HENT:      Head: Normocephalic.      Nose: Nose normal.      Mouth/Throat:      Mouth: Mucous membranes are moist.   Eyes:      Pupils: Pupils are equal, round, and reactive to light.   Cardiovascular:      Rate and Rhythm: Normal rate and regular rhythm.      Pulses: Normal pulses.      Heart sounds: Normal heart sounds.   Pulmonary:      Effort: Pulmonary effort is normal.      Breath sounds: Normal breath sounds.   Abdominal:      General: Abdomen is flat. Bowel sounds are normal.      Palpations: Abdomen is soft.   Musculoskeletal: Normal range of motion.         General: No swelling or deformity.   Skin:     General: Skin is warm and dry.      Capillary Refill: Capillary refill takes less than 2 seconds.   Neurological:      General: No focal deficit present.      Mental Status: She is alert and oriented to person, place, and time.      Cranial Nerves: No cranial nerve deficit.      Comments: Mild tremor   Psychiatric:         Mood and Affect: Mood normal.         Behavior: Behavior normal.         Fluids    Intake/Output Summary (Last 24 hours) at 7/22/2020 1513  Last data filed at 7/22/2020 0930  Gross per 24 hour   Intake 360 ml   Output --   Net 360 ml       Laboratory  Recent Labs     07/22/20  0941   WBC 2.9*   RBC 3.23*   HEMOGLOBIN 10.4*   HEMATOCRIT 32.5*   .6*   MCH 32.2   MCHC 32.0*   RDW 65.8*   PLATELETCT 57*   MPV 10.7     Recent Labs     07/20/20  1400 07/21/20  0944 07/22/20  0941   SODIUM 143 137 139   POTASSIUM 3.2* 3.3* 4.2   CHLORIDE 98 97 105   CO2 23 27 24   GLUCOSE 58* 114* 102*   BUN 12 10 9   CREATININE 0.71 0.62 0.68   CALCIUM 9.0 8.5 8.8                   Imaging  DX-SHOULDER 2+ LEFT   Final Result      No evidence of acute fracture or dislocation.      DX-HIP-UNILATERAL-WITH PELVIS-1 VIEW  LEFT   Final Result      No evidence of fracture or arthropathy.           Assessment/Plan  Pancytopenia (HCC)  Assessment & Plan  Likely due to etoh toxicity  following    Tobacco use- (present on admission)  Assessment & Plan  Cessation discussed and recommended    Alcohol withdrawal (HCC)- (present on admission)  Assessment & Plan  History of alcohol withdrawal seizure  continue CIWA protocol  Thiamine supplementation  Alcohol cessation counseling provided  Sx improving  Cessation discussed and recommended  PT/ OT to eval    Pancreatitis  Assessment & Plan  Recurrent alcoholic pancreatitis  Supportive care  Tolerating diet  Sx resolved  etoh cessation discussed and recommended as this is the etiology       VTE prophylaxis: scd

## 2020-07-22 NOTE — CARE PLAN
Problem: Safety  Goal: Will remain free from injury  Outcome: PROGRESSING AS EXPECTED  Goal: Will remain free from falls  Outcome: PROGRESSING AS EXPECTED  Intervention: Assess risk factors for falls  Note: Safety measures in place. Bed low and locked. No attempts to self rise. Close to RN station. Fall prec maintained. Safety maintained.      Problem: Bowel/Gastric:  Goal: Normal bowel function is maintained or improved  Outcome: PROGRESSING AS EXPECTED  Goal: Will not experience complications related to bowel motility  Outcome: PROGRESSING AS EXPECTED  Note: Loose stool x1 this shift. Trending lipase. Occ episodes of nausea, able to tolerate regular diet. IVF d/c'd. K+ replaced.

## 2020-07-22 NOTE — PROGRESS NOTES
Pt verbalized to RN complaints of left tooth pain radiating to left cheek. No swelling noted. MD notified. No further orders at this time. PRN oxy given for pain.

## 2020-07-22 NOTE — PROGRESS NOTES
This RN notified pt went to bathroom and when wiping noticed significant amount of blood on toilet paper. Pt also stated that it was painful when she voided. Shortly after pt felt like she had to void again although voided only a few mLs. Provider notified. UA ordered & tramadol Q6H PRN pain.

## 2020-07-22 NOTE — PROGRESS NOTES
2 RN skin check complete on transfer to [facility].   Devices in place ozygen.  Skin assessed under devices intact.  Buttocks red/blanchbale.  Groin pink/blanchable.   Right groin puncture site-dsg CDI.   Under bilat breast- pink/intact.   Barrier cream to buttocks. freq care, freq checks for incontinence, turn and reposition q2 hours.

## 2020-07-23 VITALS
HEART RATE: 82 BPM | DIASTOLIC BLOOD PRESSURE: 69 MMHG | OXYGEN SATURATION: 96 % | BODY MASS INDEX: 26.42 KG/M2 | RESPIRATION RATE: 17 BRPM | SYSTOLIC BLOOD PRESSURE: 114 MMHG | TEMPERATURE: 96.9 F | WEIGHT: 154.76 LBS | HEIGHT: 64 IN

## 2020-07-23 LAB
BASOPHILS # BLD AUTO: 1.6 % (ref 0–1.8)
BASOPHILS # BLD: 0.05 K/UL (ref 0–0.12)
EOSINOPHIL # BLD AUTO: 0.07 K/UL (ref 0–0.51)
EOSINOPHIL NFR BLD: 2.2 % (ref 0–6.9)
ERYTHROCYTE [DISTWIDTH] IN BLOOD BY AUTOMATED COUNT: 65.6 FL (ref 35.9–50)
HCT VFR BLD AUTO: 33.6 % (ref 37–47)
HGB BLD-MCNC: 10.6 G/DL (ref 12–16)
IMM GRANULOCYTES # BLD AUTO: 0.02 K/UL (ref 0–0.11)
IMM GRANULOCYTES NFR BLD AUTO: 0.6 % (ref 0–0.9)
LYMPHOCYTES # BLD AUTO: 1.28 K/UL (ref 1–4.8)
LYMPHOCYTES NFR BLD: 40.8 % (ref 22–41)
MCH RBC QN AUTO: 31.7 PG (ref 27–33)
MCHC RBC AUTO-ENTMCNC: 31.5 G/DL (ref 33.6–35)
MCV RBC AUTO: 100.6 FL (ref 81.4–97.8)
MONOCYTES # BLD AUTO: 0.34 K/UL (ref 0–0.85)
MONOCYTES NFR BLD AUTO: 10.8 % (ref 0–13.4)
NEUTROPHILS # BLD AUTO: 1.38 K/UL (ref 2–7.15)
NEUTROPHILS NFR BLD: 44 % (ref 44–72)
NRBC # BLD AUTO: 0 K/UL
NRBC BLD-RTO: 0 /100 WBC
PLATELET # BLD AUTO: 61 K/UL (ref 164–446)
PMV BLD AUTO: 11.5 FL (ref 9–12.9)
RBC # BLD AUTO: 3.34 M/UL (ref 4.2–5.4)
WBC # BLD AUTO: 3.1 K/UL (ref 4.8–10.8)

## 2020-07-23 PROCEDURE — A9270 NON-COVERED ITEM OR SERVICE: HCPCS | Performed by: HOSPITALIST

## 2020-07-23 PROCEDURE — 700102 HCHG RX REV CODE 250 W/ 637 OVERRIDE(OP): Performed by: NURSE PRACTITIONER

## 2020-07-23 PROCEDURE — 700102 HCHG RX REV CODE 250 W/ 637 OVERRIDE(OP): Performed by: HOSPITALIST

## 2020-07-23 PROCEDURE — A9270 NON-COVERED ITEM OR SERVICE: HCPCS | Performed by: EMERGENCY MEDICINE

## 2020-07-23 PROCEDURE — 700111 HCHG RX REV CODE 636 W/ 250 OVERRIDE (IP): Performed by: INTERNAL MEDICINE

## 2020-07-23 PROCEDURE — 97161 PT EVAL LOW COMPLEX 20 MIN: CPT

## 2020-07-23 PROCEDURE — 97165 OT EVAL LOW COMPLEX 30 MIN: CPT

## 2020-07-23 PROCEDURE — A9270 NON-COVERED ITEM OR SERVICE: HCPCS | Performed by: NURSE PRACTITIONER

## 2020-07-23 PROCEDURE — 85025 COMPLETE CBC W/AUTO DIFF WBC: CPT

## 2020-07-23 PROCEDURE — 36415 COLL VENOUS BLD VENIPUNCTURE: CPT

## 2020-07-23 PROCEDURE — 700102 HCHG RX REV CODE 250 W/ 637 OVERRIDE(OP): Performed by: INTERNAL MEDICINE

## 2020-07-23 PROCEDURE — 700102 HCHG RX REV CODE 250 W/ 637 OVERRIDE(OP): Performed by: EMERGENCY MEDICINE

## 2020-07-23 PROCEDURE — 99239 HOSP IP/OBS DSCHRG MGMT >30: CPT | Performed by: HOSPITALIST

## 2020-07-23 PROCEDURE — 700111 HCHG RX REV CODE 636 W/ 250 OVERRIDE (IP): Performed by: HOSPITALIST

## 2020-07-23 PROCEDURE — 700105 HCHG RX REV CODE 258: Performed by: HOSPITALIST

## 2020-07-23 PROCEDURE — A9270 NON-COVERED ITEM OR SERVICE: HCPCS | Performed by: INTERNAL MEDICINE

## 2020-07-23 RX ORDER — CEFDINIR 300 MG/1
300 CAPSULE ORAL EVERY 12 HOURS
Qty: 3 QUANTITY SUFFICIENT | Refills: 0 | Status: SHIPPED | OUTPATIENT
Start: 2020-07-23 | End: 2020-08-24

## 2020-07-23 RX ORDER — DOXEPIN HYDROCHLORIDE 10 MG/1
10 CAPSULE ORAL NIGHTLY
COMMUNITY
End: 2020-08-24

## 2020-07-23 RX ORDER — CEFDINIR 300 MG/1
300 CAPSULE ORAL EVERY 12 HOURS
Status: DISCONTINUED | OUTPATIENT
Start: 2020-07-23 | End: 2020-07-23 | Stop reason: HOSPADM

## 2020-07-23 RX ORDER — HYDROCHLOROTHIAZIDE 25 MG/1
25 TABLET ORAL DAILY
COMMUNITY
End: 2020-08-24

## 2020-07-23 RX ORDER — FOLIC ACID 1 MG/1
1 TABLET ORAL DAILY
Qty: 30 TAB | Refills: 0 | Status: SHIPPED | OUTPATIENT
Start: 2020-07-24 | End: 2020-08-24

## 2020-07-23 RX ORDER — LANOLIN ALCOHOL/MO/W.PET/CERES
100 CREAM (GRAM) TOPICAL DAILY
Qty: 30 TAB | Refills: 0 | Status: SHIPPED | OUTPATIENT
Start: 2020-07-24 | End: 2020-08-24

## 2020-07-23 RX ADMIN — OXYCODONE 5 MG: 5 TABLET ORAL at 08:49

## 2020-07-23 RX ADMIN — ONDANSETRON 4 MG: 4 TABLET, ORALLY DISINTEGRATING ORAL at 12:26

## 2020-07-23 RX ADMIN — LISINOPRIL 10 MG: 10 TABLET ORAL at 05:37

## 2020-07-23 RX ADMIN — FOLIC ACID 1 MG: 1 TABLET ORAL at 05:37

## 2020-07-23 RX ADMIN — CEFDINIR 300 MG: 300 CAPSULE ORAL at 10:39

## 2020-07-23 RX ADMIN — POTASSIUM CHLORIDE 20 MEQ: 1500 TABLET, EXTENDED RELEASE ORAL at 05:36

## 2020-07-23 RX ADMIN — PHENAZOPYRIDINE HYDROCHLORIDE 200 MG: 200 TABLET ORAL at 08:39

## 2020-07-23 RX ADMIN — FAMOTIDINE 20 MG: 20 TABLET, FILM COATED ORAL at 05:36

## 2020-07-23 RX ADMIN — METOPROLOL TARTRATE 25 MG: 25 TABLET, FILM COATED ORAL at 05:36

## 2020-07-23 RX ADMIN — PHENAZOPYRIDINE HYDROCHLORIDE 200 MG: 200 TABLET ORAL at 11:28

## 2020-07-23 RX ADMIN — THERA TABS 1 TABLET: TAB at 05:36

## 2020-07-23 RX ADMIN — Medication 100 MG: at 05:37

## 2020-07-23 RX ADMIN — DOCUSATE SODIUM 50 MG AND SENNOSIDES 8.6 MG 2 TABLET: 8.6; 5 TABLET, FILM COATED ORAL at 05:37

## 2020-07-23 RX ADMIN — LORAZEPAM 1 MG: 1 TABLET ORAL at 13:40

## 2020-07-23 ASSESSMENT — COGNITIVE AND FUNCTIONAL STATUS - GENERAL
SUGGESTED CMS G CODE MODIFIER DAILY ACTIVITY: CH
MOBILITY SCORE: 24
SUGGESTED CMS G CODE MODIFIER MOBILITY: CH
DAILY ACTIVITIY SCORE: 24

## 2020-07-23 ASSESSMENT — LIFESTYLE VARIABLES
TREMOR: TREMOR NOT VISIBLE BUT CAN BE FELT, FINGERTIP TO FINGERTIP
AGITATION: NORMAL ACTIVITY
NAUSEA AND VOMITING: MILD NAUSEA WITH NO VOMITING
ANXIETY: NO ANXIETY (AT EASE)
AGITATION: NORMAL ACTIVITY
PAROXYSMAL SWEATS: NO SWEAT VISIBLE
NAUSEA AND VOMITING: MILD NAUSEA WITH NO VOMITING
ANXIETY: *
ORIENTATION AND CLOUDING OF SENSORIUM: ORIENTED AND CAN DO SERIAL ADDITIONS
AGITATION: NORMAL ACTIVITY
AUDITORY DISTURBANCES: NOT PRESENT
AUDITORY DISTURBANCES: NOT PRESENT
VISUAL DISTURBANCES: NOT PRESENT
VISUAL DISTURBANCES: NOT PRESENT
ANXIETY: NO ANXIETY (AT EASE)
HEADACHE, FULLNESS IN HEAD: VERY MILD
PAROXYSMAL SWEATS: NO SWEAT VISIBLE
TOTAL SCORE: 3
VISUAL DISTURBANCES: NOT PRESENT
AUDITORY DISTURBANCES: NOT PRESENT
TOTAL SCORE: 8
TOTAL SCORE: 3
TREMOR: TREMOR NOT VISIBLE BUT CAN BE FELT, FINGERTIP TO FINGERTIP
HEADACHE, FULLNESS IN HEAD: VERY MILD
PAROXYSMAL SWEATS: NO SWEAT VISIBLE
ORIENTATION AND CLOUDING OF SENSORIUM: ORIENTED AND CAN DO SERIAL ADDITIONS
ANXIETY: NO ANXIETY (AT EASE)
VISUAL DISTURBANCES: NOT PRESENT
NAUSEA AND VOMITING: NO NAUSEA AND NO VOMITING
AGITATION: NORMAL ACTIVITY
TREMOR: TREMOR NOT VISIBLE BUT CAN BE FELT, FINGERTIP TO FINGERTIP
HEADACHE, FULLNESS IN HEAD: VERY MILD
PAROXYSMAL SWEATS: *
HEADACHE, FULLNESS IN HEAD: MODERATE
AUDITORY DISTURBANCES: NOT PRESENT
ORIENTATION AND CLOUDING OF SENSORIUM: ORIENTED AND CAN DO SERIAL ADDITIONS
TREMOR: *
NAUSEA AND VOMITING: MILD NAUSEA WITH NO VOMITING
TOTAL SCORE: 4
ORIENTATION AND CLOUDING OF SENSORIUM: ORIENTED AND CAN DO SERIAL ADDITIONS

## 2020-07-23 ASSESSMENT — GAIT ASSESSMENTS
GAIT LEVEL OF ASSIST: SUPERVISED
DISTANCE (FEET): 525
DEVIATION: DECREASED BASE OF SUPPORT

## 2020-07-23 ASSESSMENT — ACTIVITIES OF DAILY LIVING (ADL): TOILETING: INDEPENDENT

## 2020-07-23 NOTE — CARE PLAN
Problem: Communication  Goal: The ability to communicate needs accurately and effectively will improve  Outcome: PROGRESSING AS EXPECTED  Note: POC discussed with patient. Pending PT/OT clearance.     Problem: Safety  Goal: Will remain free from injury  Outcome: PROGRESSING AS EXPECTED  Note: Safety precautions in place.

## 2020-07-23 NOTE — DISCHARGE INSTRUCTIONS
Discharge Instructions    Discharged to home by car with relative. Discharged via wheelchair, hospital escort: Yes.  Special equipment needed: Not Applicable    Be sure to schedule a follow-up appointment with your primary care doctor or any specialists as instructed.     Discharge Plan:   Diet Plan: Discussed  Activity Level: Discussed  Confirmed Follow up Appointment: Patient to Call and Schedule Appointment  Confirmed Symptoms Management: Discussed  Medication Reconciliation Updated: Yes    I understand that a diet low in cholesterol, fat, and sodium is recommended for good health. Unless I have been given specific instructions below for another diet, I accept this instruction as my diet prescription.   Other diet: Regular    Special Instructions: None    · Is patient discharged on Warfarin / Coumadin?   No     Depression / Suicide Risk    As you are discharged from this [facility] facility, it is important to learn how to keep safe from harming yourself.    Recognize the warning signs:  · Abrupt changes in personality, positive or negative- including increase in energy   · Giving away possessions  · Change in eating patterns- significant weight changes-  positive or negative  · Change in sleeping patterns- unable to sleep or sleeping all the time   · Unwillingness or inability to communicate  · Depression  · Unusual sadness, discouragement and loneliness  · Talk of wanting to die  · Neglect of personal appearance   · Rebelliousness- reckless behavior  · Withdrawal from people/activities they love  · Confusion- inability to concentrate     If you or a loved one observes any of these behaviors or has concerns about self-harm, here's what you can do:  · Talk about it- your feelings and reasons for harming yourself  · Remove any means that you might use to hurt yourself (examples: pills, rope, extension cords, firearm)  · Get professional help from the community (Mental Health, Substance Abuse, psychological  counseling)  · Do not be alone:Call your Safe Contact- someone whom you trust who will be there for you.  · Call your local CRISIS HOTLINE 695-4256 or 754-386-2958  · Call your local Children's Mobile Crisis Response Team [place] (188) 785-7118 or www.[employer]  · Call the toll free National Suicide Prevention Hotlines   · National Suicide Prevention Lifeline 615-541-AVIY (2598)  · National Hope Line Network 800-SUICIDE (315-0039)       36.5

## 2020-07-23 NOTE — CARE PLAN
Problem: Safety  Goal: Will remain free from falls  Outcome: PROGRESSING AS EXPECTED   Bed alarm on, fall precautions in place  Problem: Infection  Goal: Will remain free from infection  Outcome: PROGRESSING AS EXPECTED   Standard precautions in place. Pt being treated for UTI.

## 2020-07-23 NOTE — PROGRESS NOTES
Patient discharged home. IV removed. gathered all patient belongings. Provided patient with clothes. Patient stated that her mastercard was missing, safekeeping call. Per safekeeping they did not have her mastercard in ED and safekeeping. Charge nurse updated. Gave patient unit number.

## 2020-07-23 NOTE — THERAPY
Physical Therapy   Initial Evaluation     Patient Name: Ashleigh Marquis  Age:  59 y.o., Sex:  female  Medical Record #: [id number]  Today's Date: 7/23/2020     Precautions: Fall Risk    Assessment  Patient is 59 y.o. female with a diagnosis of Pancreatitis and alcohol withdrawal. Pt presents at or near baseline LOF. No further skilled acute PT needs.     Plan    Recommend Physical Therapy for Evaluation only.    Discharge recommendations:  Anticipate that the patient will have no further physical therapy needs after discharge from the hospital.      Subjective    Pt reluctantly agrees to PT.     Objective       07/23/20 1335   Prior Living Situation   Prior Services None;Home-Independent   Housing / Facility Homeless   Equipment Owned None   Lives with - Patient's Self Care Capacity Friends   Prior Level of Functional Mobility   Bed Mobility Independent   Transfer Status Independent   Ambulation Independent   Distance Ambulation (Feet) 500   Assistive Devices Used None  (Pt originally states she used a SPC but refused it later)   Stairs Independent   Cognition    Cognition / Consciousness WDL   Level of Consciousness Alert   Comments Easily agitated, cooperative   Passive ROM Lower Body   Passive ROM Lower Body WDL   Active ROM Lower Body    Active ROM Lower Body  WDL   Strength Lower Body   Lower Body Strength  WDL   Lower Body Muscle Tone   Lower Body Muscle Tone  WDL   Coordination Lower Body    Coordination Lower Body  WDL   Balance Assessment   Sitting Balance (Static) Good   Sitting Balance (Dynamic) Fair +   Standing Balance (Static) Good   Standing Balance (Dynamic) Fair +   Weight Shift Sitting Good   Weight Shift Standing Fair   Comments No AD   Gait Analysis   Gait Level Of Assist Supervised   Assistive Device None   Distance (Feet) 525   Deviation Decreased Base Of Support   Weight Bearing Status FWB   Skilled Intervention Verbal Cuing   Bed Mobility    Supine to Sit Supervised   Sit to Supine  Supervised   Scooting Supervised   Rolling Supervised   Skilled Intervention Verbal Cuing   Functional Mobility   Sit to Stand Supervised   Patient / Family Goals    Patient / Family Goal #1 No goal stated   Problem List    Problems None   Anticipated Discharge Equipment   DC Equipment None

## 2020-07-23 NOTE — DISCHARGE SUMMARY
Discharge Summary    CHIEF COMPLAINT ON ADMISSION  Chief Complaint   Patient presents with   • Abdominal Pain   • Alcohol Intoxication   • Loose Stools       Reason for Admission  OTHER     Admission Date  7/20/2020    CODE STATUS  Full Code    HPI & HOSPITAL COURSE   Patient is a 59 y.o. female with history of alcohol abuse and dependence, GERD, CHF, depression, hypertension, PTSD, alcohol withdrawal seizure who presented 7/20/2020 with complaints of abdominal pain, nausea.  Patient has been frequenting to emergency department with alcohol intoxication.  Her lipase was elevated and clinically she had mild alcohol induced pancreatitis.  This has now resolved and she is tolerating a regular diet.  She was also treated for alcohol withdrawl, which is now resolved and a UTI. She has pancytopenia, likely due to alcohol toxicity, there was no associated bleeding and her platelets are trending up at time of discharge.  This issue was discussed with patient and she agrees to have this followed closely with her pcp. She was cleared by PT and alcohol cessation was discussed and recommended.  She agrees to follow up with her pcp and to return to the er if needed.    Therefore, she is discharged in fair and stable condition to home with close outpatient follow-up.    The patient met 2-midnight criteria for an inpatient stay at the time of discharge.    Discharge Date  7/23/20    FOLLOW UP ITEMS POST DISCHARGE  Pcp  AA    DISCHARGE DIAGNOSES  Active Problems:    Tobacco use POA: Yes    Pancytopenia (HCC) POA: Unknown  Resolved Problems:    Hypokalemia POA: Yes    Pancreatitis POA: Unknown    Alcohol withdrawal (HCC) POA: Yes      FOLLOW UP  No future appointments.  RANDOLPH [email or web address].[email or web address].  [address] [facility] [address]  [facility] [id number] [phone]      Per office request . You will need to call to schedule your appointment. Thank you       MEDICATIONS ON DISCHARGE     Medication List      START taking these  medications      Instructions   cefdinir 300 MG Caps  Commonly known as:  OMNICEF   Take 1 Cap by mouth every 12 hours.  Dose:  300 mg     folic acid 1 MG Tabs  Start taking on:  July 24, 2020  Commonly known as:  FOLVITE   Take 1 Tab by mouth every day.  Dose:  1 mg     multivitamin Tabs  Start taking on:  July 24, 2020   Take 1 Tab by mouth every day.  Dose:  1 Tab     thiamine 100 MG tablet  Start taking on:  July 24, 2020  Commonly known as:  THIAMINE   Take 1 Tab by mouth every day.  Dose:  100 mg        CONTINUE taking these medications      Instructions   doxepin 10 MG Caps  Commonly known as:  SINEQUAN   Take 10 mg by mouth every evening.  Dose:  10 mg     hydroCHLOROthiazide 25 MG Tabs  Commonly known as:  HYDRODIURIL   Take 25 mg by mouth every day.  Dose:  25 mg     metoprolol 25 MG Tabs  Commonly known as:  LOPRESSOR   Take 25 mg by mouth 2 times a day.  Dose:  25 mg            Allergies  Allergies   Allergen Reactions   • Sulfa Drugs Vomiting   • Toradol Vomiting       DIET  Orders Placed This Encounter   Procedures   • Diet Order Regular     Standing Status:   Standing     Number of Occurrences:   1     Order Specific Question:   Diet:     Answer:   Regular [1]       ACTIVITY  As tolerated.  Weight bearing as tolerated    CONSULTATIONS      PROCEDURES  DX-SHOULDER 2+ LEFT   Final Result      No evidence of acute fracture or dislocation.      DX-HIP-UNILATERAL-WITH PELVIS-1 VIEW LEFT   Final Result      No evidence of fracture or arthropathy.          LABORATORY  Lab Results   Component Value Date    SODIUM 139 07/22/2020    POTASSIUM 4.2 07/22/2020    CHLORIDE 105 07/22/2020    CO2 24 07/22/2020    GLUCOSE 102 (H) 07/22/2020    BUN 9 07/22/2020    CREATININE 0.68 07/22/2020    CREATININE 0.8 05/01/2009        Lab Results   Component Value Date    WBC 3.1 (L) 07/23/2020    HEMOGLOBIN 10.6 (L) 07/23/2020    HEMATOCRIT 33.6 (L) 07/23/2020    PLATELETCT 61 (L) 07/23/2020        Total time of the discharge  process exceeds 45 minutes.

## 2020-07-29 ENCOUNTER — HOSPITAL ENCOUNTER (EMERGENCY)
Facility: MEDICAL CENTER | Age: 60
End: 2020-07-29
Attending: EMERGENCY MEDICINE | Admitting: EMERGENCY MEDICINE
Payer: MEDICAID

## 2020-07-29 VITALS
WEIGHT: 155 LBS | DIASTOLIC BLOOD PRESSURE: 83 MMHG | BODY MASS INDEX: 26.46 KG/M2 | OXYGEN SATURATION: 97 % | RESPIRATION RATE: 18 BRPM | SYSTOLIC BLOOD PRESSURE: 143 MMHG | HEART RATE: 94 BPM | HEIGHT: 64 IN | TEMPERATURE: 97.4 F

## 2020-07-29 DIAGNOSIS — Z71.1 FEARED CONDITION NOT DEMONSTRATED: ICD-10-CM

## 2020-07-29 DIAGNOSIS — F10.929 ALCOHOLIC INTOXICATION WITH COMPLICATION (HCC): ICD-10-CM

## 2020-07-29 LAB
ALBUMIN SERPL BCP-MCNC: 4 G/DL (ref 3.2–4.9)
ALBUMIN/GLOB SERPL: 1.3 G/DL
ALP SERPL-CCNC: 174 U/L (ref 30–99)
ALT SERPL-CCNC: 35 U/L (ref 2–50)
ANION GAP SERPL CALC-SCNC: 21 MMOL/L (ref 7–16)
ANISOCYTOSIS BLD QL SMEAR: ABNORMAL
AST SERPL-CCNC: 63 U/L (ref 12–45)
BASOPHILS # BLD AUTO: 5.2 % (ref 0–1.8)
BASOPHILS # BLD: 0.21 K/UL (ref 0–0.12)
BILIRUB SERPL-MCNC: 0.5 MG/DL (ref 0.1–1.5)
BUN SERPL-MCNC: 6 MG/DL (ref 8–22)
CALCIUM SERPL-MCNC: 9.5 MG/DL (ref 8.5–10.5)
CHLORIDE SERPL-SCNC: 96 MMOL/L (ref 96–112)
CO2 SERPL-SCNC: 23 MMOL/L (ref 20–33)
CREAT SERPL-MCNC: 0.72 MG/DL (ref 0.5–1.4)
EOSINOPHIL # BLD AUTO: 0.04 K/UL (ref 0–0.51)
EOSINOPHIL NFR BLD: 0.9 % (ref 0–6.9)
ERYTHROCYTE [DISTWIDTH] IN BLOOD BY AUTOMATED COUNT: 69.2 FL (ref 35.9–50)
GLOBULIN SER CALC-MCNC: 3.1 G/DL (ref 1.9–3.5)
GLUCOSE SERPL-MCNC: 91 MG/DL (ref 65–99)
HCT VFR BLD AUTO: 32.9 % (ref 37–47)
HGB BLD-MCNC: 11 G/DL (ref 12–16)
HYPOCHROMIA BLD QL SMEAR: ABNORMAL
LIPASE SERPL-CCNC: 90 U/L (ref 11–82)
LYMPHOCYTES # BLD AUTO: 1.03 K/UL (ref 1–4.8)
LYMPHOCYTES NFR BLD: 25.2 % (ref 22–41)
MACROCYTES BLD QL SMEAR: ABNORMAL
MANUAL DIFF BLD: NORMAL
MCH RBC QN AUTO: 32.4 PG (ref 27–33)
MCHC RBC AUTO-ENTMCNC: 33.4 G/DL (ref 33.6–35)
MCV RBC AUTO: 97.1 FL (ref 81.4–97.8)
MONOCYTES # BLD AUTO: 0.57 K/UL (ref 0–0.85)
MONOCYTES NFR BLD AUTO: 13.9 % (ref 0–13.4)
MORPHOLOGY BLD-IMP: NORMAL
NEUTROPHILS # BLD AUTO: 2.25 K/UL (ref 2–7.15)
NEUTROPHILS NFR BLD: 54.8 % (ref 44–72)
NRBC # BLD AUTO: 0 K/UL
NRBC BLD-RTO: 0 /100 WBC
OVALOCYTES BLD QL SMEAR: NORMAL
PLATELET # BLD AUTO: 300 K/UL (ref 164–446)
PLATELET BLD QL SMEAR: NORMAL
PMV BLD AUTO: 9.7 FL (ref 9–12.9)
POC BREATHALIZER: 0.3 PERCENT (ref 0–0.01)
POTASSIUM SERPL-SCNC: 2.8 MMOL/L (ref 3.6–5.5)
PROT SERPL-MCNC: 7.1 G/DL (ref 6–8.2)
RBC # BLD AUTO: 3.39 M/UL (ref 4.2–5.4)
RBC BLD AUTO: PRESENT
SODIUM SERPL-SCNC: 140 MMOL/L (ref 135–145)
WBC # BLD AUTO: 4.1 K/UL (ref 4.8–10.8)

## 2020-07-29 PROCEDURE — 302970 POC BREATHALIZER

## 2020-07-29 PROCEDURE — 302970 POC BREATHALIZER: Performed by: EMERGENCY MEDICINE

## 2020-07-29 PROCEDURE — 83690 ASSAY OF LIPASE: CPT

## 2020-07-29 PROCEDURE — 80053 COMPREHEN METABOLIC PANEL: CPT

## 2020-07-29 PROCEDURE — 36415 COLL VENOUS BLD VENIPUNCTURE: CPT

## 2020-07-29 PROCEDURE — 99284 EMERGENCY DEPT VISIT MOD MDM: CPT

## 2020-07-29 PROCEDURE — 85027 COMPLETE CBC AUTOMATED: CPT

## 2020-07-29 PROCEDURE — 85007 BL SMEAR W/DIFF WBC COUNT: CPT

## 2020-07-29 ASSESSMENT — FIBROSIS 4 INDEX: FIB4 SCORE: 15.46

## 2020-07-29 ASSESSMENT — LIFESTYLE VARIABLES
CONSUMPTION TOTAL: INCOMPLETE
HAVE YOU EVER FELT YOU SHOULD CUT DOWN ON YOUR DRINKING: YES
HAVE PEOPLE ANNOYED YOU BY CRITICIZING YOUR DRINKING: YES
TOTAL SCORE: 4
DO YOU DRINK ALCOHOL: YES
EVER HAD A DRINK FIRST THING IN THE MORNING TO STEADY YOUR NERVES TO GET RID OF A HANGOVER: YES
TOTAL SCORE: 4
EVER FELT BAD OR GUILTY ABOUT YOUR DRINKING: YES
TOTAL SCORE: 4

## 2020-07-30 NOTE — ED NOTES
Patient vital signs rechecked and documented per [facility]. Patient denies any new needs at this time. Patient is up to date on poc      Patient is resting in hallway talking to herself. She is wait for lab results and up to date on poc

## 2020-07-30 NOTE — ED PROVIDER NOTES
"ED Provider Note    ED Provider Note    Primary care provider: CACHORRO Anthony  Means of arrival: EMS  History obtained from: Patient    CHIEF COMPLAINT  Chief Complaint   Patient presents with   • Alcohol Intoxication     pt to ED29H via EMS pt is intoxicated  called EMS for \"vomiting blood\" EMS states they saw no bloody emesis on scene. pt endorses ETOH use this evening      Seen at 7:23 PM.   HPI  Ashleigh Marquis is a 59 y.o. female who presents to the Emergency Department for vomiting blood and having blood from the rectum.  She also notes diffuse abdominal pain, diffuse back pain, she states her kidneys hurt.  She states that if she were to walk out right now she would probably die from her alcoholism.  She does admit to heavy alcohol consumption.  Despite this she states that she has been vomiting so much that she cannot tolerate any kind of p.o. intake other than a few chips over the past few days.    The history seems very inconsistent and limited due to her intoxication and underlying psychiatric disorder.    REVIEW OF SYSTEMS  See HPI, the patient basically responds in the affirmative to any question asked, therefore a full review of systems was attempted but is extremely unreliable.    PAST MEDICAL HISTORY   has a past medical history of Alcoholism (HCC), Anxiety, Arthritis, ASTHMA, Cancer (HCC) (1981), Chronic low back pain, Congestive heart failure (HCC), Depression, EtOH dependence (HCC), Fall, GERD (gastroesophageal reflux disease), HTN, Hypertension, Indigestion, Muscle disorder, OSTEOPOROSIS, Other specified symptom associated with female genital organs, Psychiatric disorder, PTSD (post-traumatic stress disorder), Renal disorder, Seizure (HCC), Ulcer, and Vitamin D deficiency.    SURGICAL HISTORY   has a past surgical history that includes hernia repair (1977); cysto stent placemnt pre surg (10/7/2010); cystoscopy stent placement (11/9/2010); ureteroscopy (11/9/2010); lasertripsy " "(11/9/2010); stent removal (11/9/2010); and gastroscopy-endo (N/A, 10/3/2018).    SOCIAL HISTORY  Social History     Tobacco Use   • Smoking status: Current Every Day Smoker     Packs/day: 0.50     Years: 20.00     Pack years: 10.00     Types: Cigarettes   • Smokeless tobacco: Never Used   • Tobacco comment: 1/2 pack per day   Substance Use Topics   • Alcohol use: Yes     Frequency: 4 or more times a week     Binge frequency: Daily or almost daily     Comment: vodka, heavy use   • Drug use: No      Social History     Substance and Sexual Activity   Drug Use No       FAMILY HISTORY  Family History   Problem Relation Age of Onset   • Heart Disease Father    • Hypertension Father    • Diabetes Father    • Cancer Paternal Grandmother    • Depression Other    • Lung Disease Neg Hx    • Stroke Neg Hx        CURRENT MEDICATIONS  Reviewed.  See Encounter Summary.     ALLERGIES  Allergies   Allergen Reactions   • Sulfa Drugs Vomiting   • Toradol Vomiting       PHYSICAL EXAM  VITAL SIGNS: /83   Pulse 94   Temp 36.3 °C (97.4 °F) (Temporal)   Resp 18   Ht 1.626 m (5' 4\")   Wt 70.3 kg (155 lb)   LMP 11/02/2015   SpO2 97%   BMI 26.61 kg/m²   Constitutional: Awake, alert in no apparent distress.  Initially resting/sleeping, awakens and participate with examination.  HENT: Normocephalic, Bilateral external ears normal. Nose normal.   Eyes: Conjunctiva normal, non-icteric, EOMI.    Thorax & Lungs: Easy unlabored respirations, Clear to ascultation bilaterally.  Cardiovascular: Regular rate, Regular rhythm, No murmurs, rubs or gallops. Bilateral pulses symmetrical.   Abdomen:  Soft, nontender, nondistended, normal active bowel sounds.  Patient sits up and moves around the gurney without any obvious discomfort, no peritonitis.  No CVA tenderness.  :    Skin: Visualized skin is  Dry, No erythema, No rash.   Musculoskeletal:   No cyanosis, clubbing or edema. No leg asymmetry.   Neurologic: Alert, Grossly non-focal.  " Slight slurred speech.  Psychiatric: Normal affect, Normal mood, disheveled, mildly anxious  Lymphatic:  No cervical LAD        RADIOLOGY  No orders to display         COURSE & MEDICAL DECISION MAKING  Pertinent Labs & Imaging studies reviewed. (See chart for details)    Differential diagnoses include but are not limited to: Alcoholism, malingering most likely, other considerations would be pancreatitis, hepatitis, pyelonephritis, sepsis etc.    7:23 PM - Medical record reviewed, patient has been to this emergency department 5 times in the past month, she was admitted for alcohol withdrawal in 7/20.  Longstanding history of alcohol dependence with frequent ER visits.    Decision Making:  This is a 59 y.o. year old female who presents intoxicated rambling about abdominal pain, back pain and hematemesis as well as hematochezia.  The patient was in the emergency department for several hours, she did not have any episodes of vomiting or bloody bowel movements.  She is anemic today but her hemoglobin is actually higher than baseline.  She did not have any tachycardia nor hypotension.  Therefore her complaint is unfounded today.  Examination is benign as well.  Lipase is minimally elevated today.  At most the patient has a mild pancreatitis, she is stable for discharge.    Discharge Medications:  Discharge Medication List as of 7/29/2020  9:38 PM          The patient was discharged home (see d/c instructions) was told to return immediately for any signs or symptoms listed, or any worsening at all.  The patient verbally agreed to the discharge precautions and follow-up plan which is documented in EPIC.    The patient's blood pressure is elevated today. >120/80. I have referred them to primary care for follow up.       FINAL IMPRESSION  1. Alcoholic intoxication with complication (HCC)    2. Feared condition not demonstrated

## 2020-07-30 NOTE — ED NOTES
Patient educated on discharge instructions and home care. Patient verbalized understanding. Patient ambulated to [facility].

## 2020-07-30 NOTE — ED TRIAGE NOTES
"pt to ED29H via EMS pt is intoxicated  called EMS for \"vomiting blood\" EMS states they saw no bloody emesis on scene. pt endorses ETOH use this evening   "

## 2020-08-08 ENCOUNTER — HOSPITAL ENCOUNTER (EMERGENCY)
Dept: HOSPITAL 8 - ED | Age: 60
Discharge: HOME | End: 2020-08-08
Payer: MEDICAID

## 2020-08-08 VITALS — WEIGHT: 165.35 LBS | BODY MASS INDEX: 28.23 KG/M2 | HEIGHT: 64 IN

## 2020-08-08 VITALS — SYSTOLIC BLOOD PRESSURE: 122 MMHG | DIASTOLIC BLOOD PRESSURE: 71 MMHG

## 2020-08-08 DIAGNOSIS — K21.9: ICD-10-CM

## 2020-08-08 DIAGNOSIS — E11.9: ICD-10-CM

## 2020-08-08 DIAGNOSIS — S00.83XA: ICD-10-CM

## 2020-08-08 DIAGNOSIS — S09.90XA: ICD-10-CM

## 2020-08-08 DIAGNOSIS — S00.12XA: Primary | ICD-10-CM

## 2020-08-08 DIAGNOSIS — I10: ICD-10-CM
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
## 2020-08-08 DIAGNOSIS — F10.220: ICD-10-CM
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
## 2020-08-08 PROCEDURE — 70486 CT MAXILLOFACIAL W/O DYE: CPT

## 2020-08-08 PROCEDURE — 99285 EMERGENCY DEPT VISIT HI MDM: CPT

## 2020-08-08 PROCEDURE — 70450 CT HEAD/BRAIN W/O DYE: CPT

## 2020-08-09 ENCOUNTER — HOSPITAL ENCOUNTER (EMERGENCY)
Facility: MEDICAL CENTER | Age: 60
End: 2020-08-09
Attending: EMERGENCY MEDICINE
Payer: MEDICAID

## 2020-08-09 VITALS
HEIGHT: 64 IN | SYSTOLIC BLOOD PRESSURE: 121 MMHG | HEART RATE: 87 BPM | RESPIRATION RATE: 18 BRPM | TEMPERATURE: 97.2 F | OXYGEN SATURATION: 95 % | WEIGHT: 155 LBS | DIASTOLIC BLOOD PRESSURE: 69 MMHG | BODY MASS INDEX: 26.46 KG/M2
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
## 2020-08-09 PROCEDURE — 700102 HCHG RX REV CODE 250 W/ 637 OVERRIDE(OP): Performed by: EMERGENCY MEDICINE

## 2020-08-09 PROCEDURE — 99284 EMERGENCY DEPT VISIT MOD MDM: CPT

## 2020-08-09 PROCEDURE — A9270 NON-COVERED ITEM OR SERVICE: HCPCS | Performed by: EMERGENCY MEDICINE

## 2020-08-09 RX ORDER — ACETAMINOPHEN 325 MG/1
650 TABLET ORAL ONCE
Status: COMPLETED | OUTPATIENT
Start: 2020-08-09 | End: 2020-08-09

## 2020-08-09 RX ADMIN — ACETAMINOPHEN 650 MG: 325 TABLET, FILM COATED ORAL at 16:34

## 2020-08-09 ASSESSMENT — FIBROSIS 4 INDEX: FIB4 SCORE: 2.02
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
## 2020-08-09 NOTE — DISCHARGE PLANNING
Medical Social Work    Referral: Transportation/Shelter    Intervention: Per chart review, pt is eligible for free taxi ride via [employer]. Beside RN informed pt of this transportation benefit. LSW also called the [facility] ([phone]) to confirm that they are open. [facility] has a day room available when their beds are full. The pt can stay in the day room where they have bathrooms and a place to shower. [facility] is temporary located at: [address] [place] [address] TATY Webb.     Plan: Pt to call [employer] for a ride to [facility].

## 2020-08-10 NOTE — ED NOTES
Pt grew tired of waiting. Wouldn't take d/c instructions. Or sign them. Alert team gave her a bus pass.

## 2020-08-10 NOTE — ED NOTES
Spoke to [facility] at length to get ride to [address] [place] [address] Unable to get a taxi at this time, reports they will call back.

## 2020-08-11 ENCOUNTER — HOSPITAL ENCOUNTER (EMERGENCY)
Facility: MEDICAL CENTER | Age: 60
End: 2020-08-11
Payer: MEDICAID

## 2020-08-11 VITALS
HEIGHT: 64 IN | RESPIRATION RATE: 17 BRPM | BODY MASS INDEX: 26.61 KG/M2 | HEART RATE: 94 BPM | DIASTOLIC BLOOD PRESSURE: 93 MMHG | OXYGEN SATURATION: 97 % | TEMPERATURE: 97 F | SYSTOLIC BLOOD PRESSURE: 133 MMHG

## 2020-08-11 PROCEDURE — 302449 STATCHG TRIAGE ONLY (STATISTIC)

## 2020-08-24 ENCOUNTER — APPOINTMENT (OUTPATIENT)
Dept: RADIOLOGY | Facility: MEDICAL CENTER | Age: 60
DRG: 641 | End: 2020-08-24
Payer: MEDICAID

## 2020-08-24 ENCOUNTER — HOSPITAL ENCOUNTER (INPATIENT)
Facility: MEDICAL CENTER | Age: 60
LOS: 6 days | DRG: 641 | End: 2020-08-30
Attending: EMERGENCY MEDICINE | Admitting: INTERNAL MEDICINE
Payer: MEDICAID

## 2020-08-24 ENCOUNTER — APPOINTMENT (OUTPATIENT)
Dept: RADIOLOGY | Facility: MEDICAL CENTER | Age: 60
DRG: 641 | End: 2020-08-24
Attending: EMERGENCY MEDICINE
Payer: MEDICAID
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
## 2020-08-24 DIAGNOSIS — E87.6 HYPOKALEMIA: ICD-10-CM

## 2020-08-24 DIAGNOSIS — E83.42 HYPOMAGNESEMIA: ICD-10-CM

## 2020-08-24 DIAGNOSIS — E80.6 HYPERBILIRUBINEMIA: ICD-10-CM

## 2020-08-24 DIAGNOSIS — K85.90 ACUTE PANCREATITIS, UNSPECIFIED COMPLICATION STATUS, UNSPECIFIED PANCREATITIS TYPE: ICD-10-CM

## 2020-08-24 PROBLEM — E87.1 HYPONATREMIA: Status: ACTIVE | Noted: 2020-08-24

## 2020-08-24 PROBLEM — R74.8 ELEVATED LIVER ENZYMES: Status: ACTIVE | Noted: 2020-08-24

## 2020-08-24 LAB
ALBUMIN SERPL BCP-MCNC: 4 G/DL (ref 3.2–4.9)
ALBUMIN/GLOB SERPL: 1.1 G/DL
ALP SERPL-CCNC: 220 U/L (ref 30–99)
ALT SERPL-CCNC: 124 U/L (ref 2–50)
ANION GAP SERPL CALC-SCNC: 12 MMOL/L (ref 7–16)
ANION GAP SERPL CALC-SCNC: 24 MMOL/L (ref 7–16)
AST SERPL-CCNC: 336 U/L (ref 12–45)
BASOPHILS # BLD AUTO: 0.9 % (ref 0–1.8)
BASOPHILS # BLD: 0.04 K/UL (ref 0–0.12)
BILIRUB SERPL-MCNC: 2.9 MG/DL (ref 0.1–1.5)
BUN SERPL-MCNC: 10 MG/DL (ref 8–22)
BUN SERPL-MCNC: 8 MG/DL (ref 8–22)
CALCIUM SERPL-MCNC: 10.1 MG/DL (ref 8.5–10.5)
CALCIUM SERPL-MCNC: 8.8 MG/DL (ref 8.5–10.5)
CHLORIDE SERPL-SCNC: 77 MMOL/L (ref 96–112)
CHLORIDE SERPL-SCNC: 88 MMOL/L (ref 96–112)
CO2 SERPL-SCNC: 28 MMOL/L (ref 20–33)
CO2 SERPL-SCNC: 31 MMOL/L (ref 20–33)
COVID ORDER STATUS COVID19: NORMAL
CREAT SERPL-MCNC: 0.95 MG/DL (ref 0.5–1.4)
CREAT SERPL-MCNC: 0.97 MG/DL (ref 0.5–1.4)
EKG IMPRESSION: NORMAL
EOSINOPHIL # BLD AUTO: 0.04 K/UL (ref 0–0.51)
EOSINOPHIL NFR BLD: 0.9 % (ref 0–6.9)
ERYTHROCYTE [DISTWIDTH] IN BLOOD BY AUTOMATED COUNT: 62.8 FL (ref 35.9–50)
GLOBULIN SER CALC-MCNC: 3.5 G/DL (ref 1.9–3.5)
GLUCOSE SERPL-MCNC: 130 MG/DL (ref 65–99)
GLUCOSE SERPL-MCNC: 64 MG/DL (ref 65–99)
HCT VFR BLD AUTO: 34.6 % (ref 37–47)
HGB BLD-MCNC: 12.6 G/DL (ref 12–16)
IMM GRANULOCYTES # BLD AUTO: 0.02 K/UL (ref 0–0.11)
IMM GRANULOCYTES NFR BLD AUTO: 0.5 % (ref 0–0.9)
LIPASE SERPL-CCNC: 97 U/L (ref 11–82)
LYMPHOCYTES # BLD AUTO: 0.87 K/UL (ref 1–4.8)
LYMPHOCYTES NFR BLD: 19.9 % (ref 22–41)
MAGNESIUM SERPL-MCNC: 0.9 MG/DL (ref 1.5–2.5)
MAGNESIUM SERPL-MCNC: 2.6 MG/DL (ref 1.5–2.5)
MCH RBC QN AUTO: 34.1 PG (ref 27–33)
MCHC RBC AUTO-ENTMCNC: 36.4 G/DL (ref 33.6–35)
MCV RBC AUTO: 93.5 FL (ref 81.4–97.8)
MONOCYTES # BLD AUTO: 0.67 K/UL (ref 0–0.85)
MONOCYTES NFR BLD AUTO: 15.3 % (ref 0–13.4)
NEUTROPHILS # BLD AUTO: 2.74 K/UL (ref 2–7.15)
NEUTROPHILS NFR BLD: 62.5 % (ref 44–72)
NRBC # BLD AUTO: 0 K/UL
NRBC BLD-RTO: 0 /100 WBC
PLATELET # BLD AUTO: 147 K/UL (ref 164–446)
PMV BLD AUTO: 10.3 FL (ref 9–12.9)
POTASSIUM SERPL-SCNC: 2.2 MMOL/L (ref 3.6–5.5)
POTASSIUM SERPL-SCNC: 2.5 MMOL/L (ref 3.6–5.5)
PROT SERPL-MCNC: 7.5 G/DL (ref 6–8.2)
RBC # BLD AUTO: 3.7 M/UL (ref 4.2–5.4)
SARS-COV-2 RNA RESP QL NAA+PROBE: NOTDETECTED
SODIUM SERPL-SCNC: 129 MMOL/L (ref 135–145)
SODIUM SERPL-SCNC: 131 MMOL/L (ref 135–145)
SPECIMEN SOURCE: NORMAL
WBC # BLD AUTO: 4.4 K/UL (ref 4.8–10.8)

## 2020-08-24 PROCEDURE — 84132 ASSAY OF SERUM POTASSIUM: CPT

## 2020-08-24 PROCEDURE — 700102 HCHG RX REV CODE 250 W/ 637 OVERRIDE(OP): Performed by: INTERNAL MEDICINE

## 2020-08-24 PROCEDURE — 96367 TX/PROPH/DG ADDL SEQ IV INF: CPT

## 2020-08-24 PROCEDURE — 700111 HCHG RX REV CODE 636 W/ 250 OVERRIDE (IP): Performed by: INTERNAL MEDICINE

## 2020-08-24 PROCEDURE — 93005 ELECTROCARDIOGRAM TRACING: CPT | Performed by: EMERGENCY MEDICINE

## 2020-08-24 PROCEDURE — A9270 NON-COVERED ITEM OR SERVICE: HCPCS | Performed by: INTERNAL MEDICINE

## 2020-08-24 PROCEDURE — 83735 ASSAY OF MAGNESIUM: CPT

## 2020-08-24 PROCEDURE — 80048 BASIC METABOLIC PNL TOTAL CA: CPT

## 2020-08-24 PROCEDURE — 70450 CT HEAD/BRAIN W/O DYE: CPT

## 2020-08-24 PROCEDURE — 700111 HCHG RX REV CODE 636 W/ 250 OVERRIDE (IP): Performed by: EMERGENCY MEDICINE

## 2020-08-24 PROCEDURE — 85025 COMPLETE CBC W/AUTO DIFF WBC: CPT

## 2020-08-24 PROCEDURE — 76705 ECHO EXAM OF ABDOMEN: CPT

## 2020-08-24 PROCEDURE — 96365 THER/PROPH/DIAG IV INF INIT: CPT

## 2020-08-24 PROCEDURE — 71046 X-RAY EXAM CHEST 2 VIEWS: CPT

## 2020-08-24 PROCEDURE — 83690 ASSAY OF LIPASE: CPT

## 2020-08-24 PROCEDURE — 99291 CRITICAL CARE FIRST HOUR: CPT

## 2020-08-24 PROCEDURE — 80053 COMPREHEN METABOLIC PANEL: CPT

## 2020-08-24 PROCEDURE — HZ2ZZZZ DETOXIFICATION SERVICES FOR SUBSTANCE ABUSE TREATMENT: ICD-10-PCS | Performed by: INTERNAL MEDICINE

## 2020-08-24 PROCEDURE — U0003 INFECTIOUS AGENT DETECTION BY NUCLEIC ACID (DNA OR RNA); SEVERE ACUTE RESPIRATORY SYNDROME CORONAVIRUS 2 (SARS-COV-2) (CORONAVIRUS DISEASE [COVID-19]), AMPLIFIED PROBE TECHNIQUE, MAKING USE OF HIGH THROUGHPUT TECHNOLOGIES AS DESCRIBED BY CMS-2020-01-R: HCPCS

## 2020-08-24 PROCEDURE — 700102 HCHG RX REV CODE 250 W/ 637 OVERRIDE(OP): Performed by: EMERGENCY MEDICINE

## 2020-08-24 PROCEDURE — A9270 NON-COVERED ITEM OR SERVICE: HCPCS | Performed by: EMERGENCY MEDICINE

## 2020-08-24 PROCEDURE — 770022 HCHG ROOM/CARE - ICU (200)

## 2020-08-24 PROCEDURE — 700101 HCHG RX REV CODE 250

## 2020-08-24 PROCEDURE — 99291 CRITICAL CARE FIRST HOUR: CPT | Performed by: INTERNAL MEDICINE

## 2020-08-24 PROCEDURE — 96366 THER/PROPH/DIAG IV INF ADDON: CPT

## 2020-08-24 PROCEDURE — C9803 HOPD COVID-19 SPEC COLLECT: HCPCS | Performed by: EMERGENCY MEDICINE

## 2020-08-24 RX ORDER — PROMETHAZINE HYDROCHLORIDE 25 MG/1
12.5-25 SUPPOSITORY RECTAL EVERY 4 HOURS PRN
Status: DISCONTINUED | OUTPATIENT
Start: 2020-08-24 | End: 2020-08-30 | Stop reason: HOSPADM

## 2020-08-24 RX ORDER — PROCHLORPERAZINE EDISYLATE 5 MG/ML
5-10 INJECTION INTRAMUSCULAR; INTRAVENOUS EVERY 4 HOURS PRN
Status: DISCONTINUED | OUTPATIENT
Start: 2020-08-24 | End: 2020-08-30 | Stop reason: HOSPADM

## 2020-08-24 RX ORDER — HEPARIN SODIUM 5000 [USP'U]/ML
5000 INJECTION, SOLUTION INTRAVENOUS; SUBCUTANEOUS EVERY 8 HOURS
Status: DISCONTINUED | OUTPATIENT
Start: 2020-08-24 | End: 2020-08-30 | Stop reason: HOSPADM

## 2020-08-24 RX ORDER — PROMETHAZINE HYDROCHLORIDE 25 MG/1
12.5-25 TABLET ORAL EVERY 4 HOURS PRN
Status: DISCONTINUED | OUTPATIENT
Start: 2020-08-24 | End: 2020-08-30 | Stop reason: HOSPADM

## 2020-08-24 RX ORDER — MAGNESIUM SULFATE HEPTAHYDRATE 40 MG/ML
4 INJECTION, SOLUTION INTRAVENOUS ONCE
Status: COMPLETED | OUTPATIENT
Start: 2020-08-24 | End: 2020-08-24

## 2020-08-24 RX ORDER — LORAZEPAM 2 MG/ML
2 INJECTION INTRAMUSCULAR
Status: DISCONTINUED | OUTPATIENT
Start: 2020-08-24 | End: 2020-08-25

## 2020-08-24 RX ORDER — ONDANSETRON 4 MG/1
4 TABLET, ORALLY DISINTEGRATING ORAL EVERY 4 HOURS PRN
Status: DISCONTINUED | OUTPATIENT
Start: 2020-08-24 | End: 2020-08-24

## 2020-08-24 RX ORDER — METOCLOPRAMIDE 10 MG/1
10 TABLET ORAL
Status: DISCONTINUED | OUTPATIENT
Start: 2020-08-24 | End: 2020-08-30 | Stop reason: HOSPADM

## 2020-08-24 RX ORDER — LORAZEPAM 2 MG/ML
1 INJECTION INTRAMUSCULAR
Status: DISCONTINUED | OUTPATIENT
Start: 2020-08-24 | End: 2020-08-25

## 2020-08-24 RX ORDER — POLYETHYLENE GLYCOL 3350 17 G/17G
1 POWDER, FOR SOLUTION ORAL
Status: DISCONTINUED | OUTPATIENT
Start: 2020-08-24 | End: 2020-08-26

## 2020-08-24 RX ORDER — LORAZEPAM 1 MG/1
1 TABLET ORAL ONCE
Status: COMPLETED | OUTPATIENT
Start: 2020-08-24 | End: 2020-08-24

## 2020-08-24 RX ORDER — ONDANSETRON 2 MG/ML
4 INJECTION INTRAMUSCULAR; INTRAVENOUS EVERY 4 HOURS PRN
Status: DISCONTINUED | OUTPATIENT
Start: 2020-08-24 | End: 2020-08-24

## 2020-08-24 RX ORDER — AMOXICILLIN 250 MG
2 CAPSULE ORAL 2 TIMES DAILY
Status: DISCONTINUED | OUTPATIENT
Start: 2020-08-24 | End: 2020-08-26

## 2020-08-24 RX ORDER — POTASSIUM CHLORIDE 7.45 MG/ML
10 INJECTION INTRAVENOUS ONCE
Status: COMPLETED | OUTPATIENT
Start: 2020-08-24 | End: 2020-08-24

## 2020-08-24 RX ORDER — POTASSIUM CHLORIDE 20 MEQ/1
20 TABLET, EXTENDED RELEASE ORAL 2 TIMES DAILY
Status: DISCONTINUED | OUTPATIENT
Start: 2020-08-24 | End: 2020-08-30 | Stop reason: HOSPADM

## 2020-08-24 RX ORDER — LORAZEPAM 2 MG/ML
3 INJECTION INTRAMUSCULAR
Status: DISCONTINUED | OUTPATIENT
Start: 2020-08-24 | End: 2020-08-25

## 2020-08-24 RX ORDER — POTASSIUM CHLORIDE 7.45 MG/ML
10 INJECTION INTRAVENOUS
Status: COMPLETED | OUTPATIENT
Start: 2020-08-24 | End: 2020-08-24

## 2020-08-24 RX ORDER — BISACODYL 10 MG
10 SUPPOSITORY, RECTAL RECTAL
Status: DISCONTINUED | OUTPATIENT
Start: 2020-08-24 | End: 2020-08-26

## 2020-08-24 RX ORDER — LORAZEPAM 2 MG/ML
4 INJECTION INTRAMUSCULAR
Status: DISCONTINUED | OUTPATIENT
Start: 2020-08-24 | End: 2020-08-25

## 2020-08-24 RX ORDER — SODIUM CHLORIDE 9 MG/ML
INJECTION, SOLUTION INTRAVENOUS
Status: ACTIVE
Start: 2020-08-24 | End: 2020-08-25

## 2020-08-24 RX ORDER — POTASSIUM CHLORIDE 20 MEQ/1
80 TABLET, EXTENDED RELEASE ORAL ONCE
Status: COMPLETED | OUTPATIENT
Start: 2020-08-24 | End: 2020-08-24

## 2020-08-24 RX ADMIN — HEPARIN SODIUM 5000 UNITS: 5000 INJECTION, SOLUTION INTRAVENOUS; SUBCUTANEOUS at 20:00

## 2020-08-24 RX ADMIN — THIAMINE HYDROCHLORIDE: 100 INJECTION, SOLUTION INTRAMUSCULAR; INTRAVENOUS at 08:48

## 2020-08-24 RX ADMIN — POTASSIUM CHLORIDE 10 MEQ: 10 INJECTION, SOLUTION INTRAVENOUS at 17:47

## 2020-08-24 RX ADMIN — POTASSIUM CHLORIDE 80 MEQ: 1500 TABLET, EXTENDED RELEASE ORAL at 21:31

## 2020-08-24 RX ADMIN — LORAZEPAM 1 MG: 2 INJECTION INTRAMUSCULAR; INTRAVENOUS at 16:52

## 2020-08-24 RX ADMIN — HEPARIN SODIUM 5000 UNITS: 5000 INJECTION, SOLUTION INTRAVENOUS; SUBCUTANEOUS at 14:21

## 2020-08-24 RX ADMIN — POTASSIUM CHLORIDE 10 MEQ: 10 INJECTION, SOLUTION INTRAVENOUS at 14:04

## 2020-08-24 RX ADMIN — LORAZEPAM 1 MG: 1 TABLET ORAL at 12:50

## 2020-08-24 RX ADMIN — POTASSIUM CHLORIDE 10 MEQ: 10 INJECTION, SOLUTION INTRAVENOUS at 16:40

## 2020-08-24 RX ADMIN — POTASSIUM CHLORIDE 10 MEQ: 7.46 INJECTION, SOLUTION INTRAVENOUS at 10:43

## 2020-08-24 RX ADMIN — THIAMINE HYDROCHLORIDE 100 MG: 100 INJECTION, SOLUTION INTRAMUSCULAR; INTRAVENOUS at 12:27

## 2020-08-24 RX ADMIN — METOCLOPRAMIDE 10 MG: 10 TABLET ORAL at 15:18

## 2020-08-24 RX ADMIN — MAGNESIUM SULFATE IN WATER 4 G: 40 INJECTION, SOLUTION INTRAVENOUS at 13:28

## 2020-08-24 RX ADMIN — PROCHLORPERAZINE EDISYLATE 10 MG: 5 INJECTION INTRAMUSCULAR; INTRAVENOUS at 21:32

## 2020-08-24 RX ADMIN — POTASSIUM CHLORIDE 20 MEQ: 1500 TABLET, EXTENDED RELEASE ORAL at 16:52

## 2020-08-24 RX ADMIN — LORAZEPAM 2 MG: 2 INJECTION INTRAMUSCULAR; INTRAVENOUS at 21:13

## 2020-08-24 RX ADMIN — POTASSIUM CHLORIDE 10 MEQ: 10 INJECTION, SOLUTION INTRAVENOUS at 15:18

## 2020-08-24 ASSESSMENT — ENCOUNTER SYMPTOMS
TINGLING: 1
SPEECH CHANGE: 0
DOUBLE VISION: 0
NECK PAIN: 0
HEMOPTYSIS: 0
SENSORY CHANGE: 1
COUGH: 0
VOMITING: 1
WEAKNESS: 1
BRUISES/BLEEDS EASILY: 0
MEMORY LOSS: 0
LOSS OF CONSCIOUSNESS: 0
BLURRED VISION: 0
BLOOD IN STOOL: 0
FEVER: 0
PALPITATIONS: 0
NERVOUS/ANXIOUS: 1
POLYDIPSIA: 0
NAUSEA: 1
MYALGIAS: 0
DIARRHEA: 0

## 2020-08-24 ASSESSMENT — LIFESTYLE VARIABLES
ANXIETY: *
TOTAL SCORE: 10
TREMOR: *
AVERAGE NUMBER OF DAYS PER WEEK YOU HAVE A DRINK CONTAINING ALCOHOL: 7
ANXIETY: *
TREMOR: *
DO YOU DRINK ALCOHOL: YES
ANXIETY: MILDLY ANXIOUS
TOTAL SCORE: 12
SUBSTANCE_ABUSE: 1
TOTAL SCORE: 4
ORIENTATION AND CLOUDING OF SENSORIUM: ORIENTED AND CAN DO SERIAL ADDITIONS
NAUSEA AND VOMITING: *
AUDITORY DISTURBANCES: NOT PRESENT
HEADACHE, FULLNESS IN HEAD: NOT PRESENT
EVER FELT BAD OR GUILTY ABOUT YOUR DRINKING: YES
TREMOR: *
HAVE PEOPLE ANNOYED YOU BY CRITICIZING YOUR DRINKING: YES
ORIENTATION AND CLOUDING OF SENSORIUM: ORIENTED AND CAN DO SERIAL ADDITIONS
VISUAL DISTURBANCES: NOT PRESENT
AUDITORY DISTURBANCES: NOT PRESENT
TOTAL SCORE: 4
CONSUMPTION TOTAL: POSITIVE
PAROXYSMAL SWEATS: NO SWEAT VISIBLE
PAROXYSMAL SWEATS: NO SWEAT VISIBLE
HEADACHE, FULLNESS IN HEAD: NOT PRESENT
VISUAL DISTURBANCES: NOT PRESENT
AGITATION: *
HOW MANY TIMES IN THE PAST YEAR HAVE YOU HAD 5 OR MORE DRINKS IN A DAY: 365
ON A TYPICAL DAY WHEN YOU DRINK ALCOHOL HOW MANY DRINKS DO YOU HAVE: 10
TOTAL SCORE: 17
HEADACHE, FULLNESS IN HEAD: MILD
VISUAL DISTURBANCES: NOT PRESENT
TACTILE DISTURBANCES: VERY MILD ITCHING, PINS AND NEEDLES SENSATION, BURNING OR NUMBNESS
AGITATION: SOMEWHAT MORE THAN NORMAL ACTIVITY
NAUSEA AND VOMITING: *
HAVE YOU EVER FELT YOU SHOULD CUT DOWN ON YOUR DRINKING: YES
NAUSEA AND VOMITING: *
ORIENTATION AND CLOUDING OF SENSORIUM: ORIENTED AND CAN DO SERIAL ADDITIONS
AUDITORY DISTURBANCES: NOT PRESENT
EVER HAD A DRINK FIRST THING IN THE MORNING TO STEADY YOUR NERVES TO GET RID OF A HANGOVER: YES
PAROXYSMAL SWEATS: *
AGITATION: *
TOTAL SCORE: 4

## 2020-08-24 ASSESSMENT — CHA2DS2 SCORE
AGE IN YEARS: <65
SEX: FEMALE

## 2020-08-24 ASSESSMENT — FIBROSIS 4 INDEX
FIB4 SCORE: 2.02
FIB4 SCORE: 11.7

## 2020-08-24 ASSESSMENT — CURB65 SCORE
SBP LESS THAN 90 OR DBNP LESS THAN 60: NO
AGE IS GREATER THAN OR EQUAL TO 65: NO
RESPIRATORY RATE GREATER THAN OR EQUAL TO 30: NO

## 2020-08-24 ASSESSMENT — CHADS2 SCORE: AGE GREATER THAN OR EQUAL TO 75: NO

## 2020-08-24 NOTE — CONSULTS
Critical Care Consultation  To be used as an H&P    Date of consult: 8/24/2020    Referring Physician  López Stiles D.O.    Reason for Consultation  Hypokalemia, hyponatremia, & hypomagnesemia     History of Presenting Illness  59 y.o. female who presented 8/24/2020 with a ground-level fall and resultant knee pain.  She states that she has not been able to eat much this week and is at significant nausea and vomiting.  She started feeling weakness in her legs as well as a tingling sensation but denies focal deficits.  Nothing makes it better or worse. Denies fevers, chills, headache, loss of consciousness, chest pain, shortness of breath.    In the emergency department she was found to be hypokalemic and hypomagnesemic.  ICU consulted given the profound nature of these electrolyte disturbances and symptoms.    Code Status  Full Code    Review of Systems  Review of Systems   Constitutional: Positive for malaise/fatigue. Negative for fever.   HENT: Negative for ear pain and nosebleeds.    Eyes: Negative for blurred vision and double vision.   Respiratory: Negative for cough and hemoptysis.    Cardiovascular: Negative for chest pain and palpitations.   Gastrointestinal: Positive for nausea and vomiting. Negative for blood in stool and diarrhea.   Genitourinary: Negative for dysuria and urgency.   Musculoskeletal: Negative for myalgias and neck pain.   Skin: Negative for itching and rash.   Neurological: Positive for tingling, sensory change and weakness. Negative for speech change and loss of consciousness.   Endo/Heme/Allergies: Negative for polydipsia. Does not bruise/bleed easily.   Psychiatric/Behavioral: Positive for substance abuse. Negative for memory loss. The patient is nervous/anxious.        Past Medical History   has a past medical history of Alcoholism (HCC), Anxiety, Arthritis, ASTHMA, Cancer (HCC) (1981), Chronic low back pain, Congestive heart failure (HCC), Depression, EtOH dependence ([facility]), Fall,  GERD (gastroesophageal reflux disease), HTN, Hypertension, Indigestion, Muscle disorder, OSTEOPOROSIS, Other specified symptom associated with female genital organs, Psychiatric disorder, PTSD (post-traumatic stress disorder), Renal disorder, Seizure (HCC), Ulcer, and Vitamin D deficiency. She also has no past medical history of Heart murmur.    Surgical History   has a past surgical history that includes hernia repair (1977); cysto stent placemnt pre surg (10/7/2010); cystoscopy stent placement (11/9/2010); ureteroscopy (11/9/2010); lasertripsy (11/9/2010); stent removal (11/9/2010); and gastroscopy-endo (N/A, 10/3/2018).    Family History  family history includes Cancer in her paternal grandmother; Depression in an other family member; Diabetes in her father; Heart Disease in her father; Hypertension in her father.    Social History   reports that she has been smoking cigarettes. She has a 10.00 pack-year smoking history. She has never used smokeless tobacco. She reports current alcohol use. She reports that she does not use drugs.    Medications  Home Medications     Reviewed by Aretha Hsieh (Pharmacy Tech) on 08/24/20 at 1127  Med List Status: Complete   Medication Last Dose Status        Patient Tyron Taking any Medications                     Current Facility-Administered Medications   Medication Dose Route Frequency Provider Last Rate Last Dose   • MD Alert...ICU Electrolyte Replacement per Pharmacy   Other PHARMACY TO DOSE Jim Sandoval M.D.       • senna-docusate (PERICOLACE or SENOKOT S) 8.6-50 MG per tablet 2 Tab  2 Tab Oral BID Jim Sandoval M.D.        And   • polyethylene glycol/lytes (MIRALAX) PACKET 1 Packet  1 Packet Oral QDAY PRN Jim Sandoval M.D.        And   • magnesium hydroxide (MILK OF MAGNESIA) suspension 30 mL  30 mL Oral QDAY PRN Jim Sandoval M.D.        And   • bisacodyl (DULCOLAX) suppository 10 mg  10 mg Rectal QDAY PRN Jim Sandoval M.D.       • heparin  injection 5,000 Units  5,000 Units Subcutaneous Q8HRS Jim Sandoval M.D.   5,000 Units at 08/24/20 1421   • promethazine (PHENERGAN) tablet 12.5-25 mg  12.5-25 mg Oral Q4HRS PRN Jim Sandoval M.D.       • promethazine (PHENERGAN) suppository 12.5-25 mg  12.5-25 mg Rectal Q4HRS PRN Jim Sandoval M.D.       • prochlorperazine (COMPAZINE) injection 5-10 mg  5-10 mg Intravenous Q4HRS PRN Jim Sandoval M.D.       • potassium chloride (KCL) ivpb 10 mEq  10 mEq Intravenous Q HOUR Jim Sandoval M.D. 100 mL/hr at 08/24/20 1747 10 mEq at 08/24/20 1747   • thiamine (B-1) IVPB 100 mg in 100 mL D5W (premix)  100 mg Intravenous DAILY Jim Sandoval M.D.   Stopped at 08/24/20 1257   • SODIUM CHLORIDE 0.9 % IV SOLN            • potassium chloride SA (Kdur) tablet 20 mEq  20 mEq Oral BID Ervni Arroyo Jr., D.O.   20 mEq at 08/24/20 1652   • LORazepam (ATIVAN) injection 4 mg  4 mg Intravenous Q15 MIN PRN Ervin Arroyo Jr. D.O.        Or   • LORazepam (ATIVAN) injection 3 mg  3 mg Intravenous Q15 MIN PRN Ervin Arroyo Jr., D.O.        Or   • LORazepam (ATIVAN) injection 2 mg  2 mg Intravenous Q15 MIN PRN Ervin Arroyo Jr. D.O.        Or   • LORazepam (ATIVAN) injection 1 mg  1 mg Intravenous Q15 MIN PRN Ervin Arroyo Jr., D.O.   1 mg at 08/24/20 1652   • metoclopramide (REGLAN) tablet 10 mg  10 mg Oral ACHS PRN Ervin Arroyo Jr., D.O.   10 mg at 08/24/20 1518       Allergies  Allergies   Allergen Reactions   • Sulfa Drugs Vomiting   • Toradol Vomiting       Vital Signs last 24 hours  Temp:  [36 °C (96.8 °F)-36.2 °C (97.2 °F)] 36.2 °C (97.2 °F)  Pulse:  [] 89  Resp:  [13-18] 18  BP: (115-138)/(65-88) 120/68  SpO2:  [96 %-98 %] 96 %      Physical Exam  Physical Exam  Constitutional:       General: She is not in acute distress.     Appearance: She is not toxic-appearing.   HENT:      Head: Normocephalic and atraumatic.      Nose: Nose normal.      Mouth/Throat:      Mouth: Mucous membranes are  dry.   Eyes:      Extraocular Movements: Extraocular movements intact.      Pupils: Pupils are equal, round, and reactive to light.   Cardiovascular:      Rate and Rhythm: Normal rate and regular rhythm.      Pulses: Normal pulses.      Heart sounds: Normal heart sounds.   Pulmonary:      Effort: Pulmonary effort is normal.      Breath sounds: Normal breath sounds.   Abdominal:      General: Abdomen is flat.      Tenderness: There is no guarding or rebound.   Musculoskeletal: Normal range of motion.         General: No swelling, tenderness, deformity or signs of injury.      Right lower leg: No edema.      Left lower leg: No edema.   Skin:     General: Skin is warm and dry.      Capillary Refill: Capillary refill takes 2 to 3 seconds.   Neurological:      General: No focal deficit present.      Mental Status: She is alert and oriented to person, place, and time.      Cranial Nerves: No cranial nerve deficit.      Sensory: No sensory deficit.      Motor: No weakness.      Gait: Gait normal.   Psychiatric:         Mood and Affect: Mood normal.         Thought Content: Thought content normal.         Fluids    Intake/Output Summary (Last 24 hours) at 8/24/2020 1813  Last data filed at 8/24/2020 1800  Gross per 24 hour   Intake 1608.33 ml   Output --   Net 1608.33 ml       Laboratory  Recent Results (from the past 48 hour(s))   CBC WITH DIFFERENTIAL    Collection Time: 08/24/20  8:07 AM   Result Value Ref Range    WBC 4.4 (L) 4.8 - 10.8 K/uL    RBC 3.70 (L) 4.20 - 5.40 M/uL    Hemoglobin 12.6 12.0 - 16.0 g/dL    Hematocrit 34.6 (L) 37.0 - 47.0 %    MCV 93.5 81.4 - 97.8 fL    MCH 34.1 (H) 27.0 - 33.0 pg    MCHC 36.4 (H) 33.6 - 35.0 g/dL    RDW 62.8 (H) 35.9 - 50.0 fL    Platelet Count 147 (L) 164 - 446 K/uL    MPV 10.3 9.0 - 12.9 fL    Neutrophils-Polys 62.50 44.00 - 72.00 %    Lymphocytes 19.90 (L) 22.00 - 41.00 %    Monocytes 15.30 (H) 0.00 - 13.40 %    Eosinophils 0.90 0.00 - 6.90 %    Basophils 0.90 0.00 - 1.80 %     Immature Granulocytes 0.50 0.00 - 0.90 %    Nucleated RBC 0.00 /100 WBC    Neutrophils (Absolute) 2.74 2.00 - 7.15 K/uL    Lymphs (Absolute) 0.87 (L) 1.00 - 4.80 K/uL    Monos (Absolute) 0.67 0.00 - 0.85 K/uL    Eos (Absolute) 0.04 0.00 - 0.51 K/uL    Baso (Absolute) 0.04 0.00 - 0.12 K/uL    Immature Granulocytes (abs) 0.02 0.00 - 0.11 K/uL    NRBC (Absolute) 0.00 K/uL   Comp Metabolic Panel    Collection Time: 08/24/20  8:07 AM   Result Value Ref Range    Sodium 129 (L) 135 - 145 mmol/L    Potassium 2.2 (LL) 3.6 - 5.5 mmol/L    Chloride 77 (L) 96 - 112 mmol/L    Co2 28 20 - 33 mmol/L    Anion Gap 24.0 (H) 7.0 - 16.0    Glucose 64 (L) 65 - 99 mg/dL    Bun 8 8 - 22 mg/dL    Creatinine 0.97 0.50 - 1.40 mg/dL    Calcium 10.1 8.5 - 10.5 mg/dL    AST(SGOT) 336 (H) 12 - 45 U/L    ALT(SGPT) 124 (H) 2 - 50 U/L    Alkaline Phosphatase 220 (H) 30 - 99 U/L    Total Bilirubin 2.9 (H) 0.1 - 1.5 mg/dL    Albumin 4.0 3.2 - 4.9 g/dL    Total Protein 7.5 6.0 - 8.2 g/dL    Globulin 3.5 1.9 - 3.5 g/dL    A-G Ratio 1.1 g/dL   MAGNESIUM    Collection Time: 08/24/20  8:07 AM   Result Value Ref Range    Magnesium 0.9 (LL) 1.5 - 2.5 mg/dL   ESTIMATED GFR    Collection Time: 08/24/20  8:07 AM   Result Value Ref Range    GFR If African American >60 >60 mL/min/1.73 m 2    GFR If Non African American 59 (A) >60 mL/min/1.73 m 2   LIPASE    Collection Time: 08/24/20  8:07 AM   Result Value Ref Range    Lipase 97 (H) 11 - 82 U/L   COVID/SARS CoV-2 PCR    Collection Time: 08/24/20 11:13 AM    Specimen: Nasopharyngeal; Respirate   Result Value Ref Range    COVID Order Status Received    SARS-CoV-2, PCR (In-House)    Collection Time: 08/24/20 11:13 AM   Result Value Ref Range    SARS-CoV-2 Source NP Swab    EKG    Collection Time: 08/24/20 11:21 AM   Result Value Ref Range    Report       [facility] Emergency Dept.    Test Date:  2020-08-24  Pt Name:    LAKSHMI DARLING              Department: ER  MRN:        [id number]                       Room:       [facility]  Gender:     Female                       Technician: [id number]  :        1960                   Requested By:HAILY LUCIANO  Order #:    [id number]                    Reading MD:    Measurements  Intervals                                Axis  Rate:       96                           P:          62  AR:         148                          QRS:        14  QRSD:       92                           T:          20  QT:         400  QTc:        506    Interpretive Statements  SINUS RHYTHM  PROBABLE POSTERIOR INFARCT  BORDERLINE PROLONGED QT INTERVAL  Compared to ECG 2019 17:11:12  Myocardial infarct finding now present         Imaging  US-RUQ   Final Result      1.  No acute sonographic abnormality. No gallstones.   2.  Increased hepatic echogenicity, suggestive of steatosis and/or hepatocellular disease.      DX-CHEST-2 VIEWS   Final Result      No acute cardiopulmonary abnormality.      CT-HEAD W/O   Final Result      1. No CT evidence of acute infarct, hemorrhage or mass.   2. Mild to moderate global parenchymal atrophy. Chronic small vessel ischemic changes.          Assessment/Plan  Hypokalemia  Assessment & Plan  No ECG changes but symptomatic, initial level 2.2  Replete 10 meq IV x 4 then recheck  Consistent with history of poor po intake    Hypomagnesemia- (present on admission)  Assessment & Plan  Secondary to alcoholism and poor po intake  Replete with 4 g IV then recheck    Hyponatremia  Assessment & Plan  129 upon admission - clinically and by history dehydrated  Regular diet ordered, routine monitoring    Elevated liver enzymes  Assessment & Plan  Consistent with history of alcohol abuse, routine monitoring for now  RUQ in the ED: Increased hepatic echogenicity, suggestive of steatosis and/or hepatocellular disease.    Alcoholism (HCC)- (present on admission)  Assessment & Plan  Active drinking with no plans for cessation, encouraged cessation   Thiamine IV  CIWA as  needed for withdrawal       59 year old female with alcoholism here with severe electrolyte derangements necessitating frequent assessment and therapy.     The patient remains critically ill,  I have assessed and reassessed the blood pressure,  cardiovascular status, labs and response to interventions. This patient remains at high risk for worsening shock and death without the above critical care interventions.    Discussed patient condition and risk of morbidity and/or mortality with Family, RN, RT, Pharmacy, Code status disscussed and Patient.    The patient remains critically ill.  Critical care time = 35 minutes in directly providing and coordinating critical care and extensive data review.  No time overlap and excludes procedures.

## 2020-08-24 NOTE — ASSESSMENT & PLAN NOTE
No ECG changes but symptomatic, initial level 2.2  3.7 this AM  Consistent with history of poor po intake

## 2020-08-24 NOTE — ED PROVIDER NOTES
ED Provider  Scribed for López Stiles D.O. by Iva Moeller. 8/24/2020  6:53 AM    Means of arrival:EMS  History obtained from:Patient  History limited by: None    CHIEF COMPLAINT  Chief Complaint   Patient presents with   • Fall     Pt reports fall last night, reports right knee pain, bilateral elbow pain, and reports she hit her head. No LOC. ETOH involved.        HPI  Ashleigh Marquis is a 59 y.o. female with alcoholism who presents for evaluation following a ground level fall yesterday morning. Patient states yesterday morning she woke up and walked outside, where she fell into the street. She adds that she has been experiencing leg weakness for the past week, and that this is the reason for her fall. She endorses associated right knee and left elbow pain, but denies any loss of consciousness. Patient was able to get up on her own following the fall. She reports hitting her head, but denies any head pain. Per patient, she has been experiencing multiple falls recently. She notes she has been NPO for a week. She notes that she has vomiting exacerbated with drinking water. No alleviating factors attempted. Patient is non-compliant with her medications. Patient has a history of alcohol related seizures.    REVIEW OF SYSTEMS  See HPI for further details. All other systems are negative.     PAST MEDICAL HISTORY   has a past medical history of Alcoholism ([facility]), Anxiety, Arthritis, ASTHMA, Cancer ([facility]) (1981), Chronic low back pain, Congestive heart failure ([facility]), Depression, EtOH dependence ([facility]), Fall, GERD (gastroesophageal reflux disease), HTN, Hypertension, Indigestion, Muscle disorder, OSTEOPOROSIS, Other specified symptom associated with female genital organs, Psychiatric disorder, PTSD (post-traumatic stress disorder), Renal disorder, Seizure ([facility]), Ulcer, and Vitamin D deficiency.    SOCIAL HISTORY  Social History     Tobacco Use   • Smoking status: Current Every Day Smoker     Packs/day: 0.50     Years:  "20.00     Pack years: 10.00     Types: Cigarettes   • Smokeless tobacco: Never Used   • Tobacco comment: 1/2 pack per day   Substance and Sexual Activity   • Alcohol use: Yes     Frequency: 4 or more times a week     Binge frequency: Daily or almost daily     Comment: vodka, heavy use   • Drug use: No   • Sexual activity: Never     Partners: Male       SURGICAL HISTORY   has a past surgical history that includes hernia repair (1977); cysto stent placemnt pre surg (10/7/2010); cystoscopy stent placement (11/9/2010); ureteroscopy (11/9/2010); lasertripsy (11/9/2010); stent removal (11/9/2010); and gastroscopy-endo (N/A, 10/3/2018).    CURRENT MEDICATIONS  Home Medications     Reviewed by Aretha Hsieh (Pharmacy Tech) on 08/24/20 at 1127  Med List Status: Complete   Medication Last Dose Status        Patient Tyron Taking any Medications                       ALLERGIES  Allergies   Allergen Reactions   • Sulfa Drugs Vomiting   • Toradol Vomiting       PHYSICAL EXAM  VITAL SIGNS: /88   Pulse (!) 108   Temp 36 °C (96.8 °F) (Temporal)   Resp 18   Ht 1.626 m (5' 4\")   Wt 70.3 kg (155 lb)   LMP 11/02/2015   SpO2 98%   BMI 26.61 kg/m²   Constitutional: Alert in no apparent distress.  HENT: No signs of trauma, mucous membranes are moist  Eyes: Conjunctiva normal, Non-icteric.   Neck: Normal range of motion, No tenderness, Supple.  Lymphatic: No lymphadenopathy noted.   Cardiovascular: Regular rate and rhythm, no murmurs.   Thorax & Lungs: Normal breath sounds, No respiratory distress, No wheezing, No chest tenderness.   Abdomen: Bowel sounds normal, Soft, No tenderness, No masses, No pulsatile masses. No peritoneal signs.  Skin: Warm, Dry, normal color.   Back: No bony tenderness, No CVA tenderness.   Extremities: No edema, No tenderness, No cyanosis  Musculoskeletal: Good range of motion in all major joints. No tenderness to palpation or major deformities noted.   Neurologic: Alert and oriented x4, " Normal motor function, Normal sensory function, No focal deficits noted.   Psychiatric: Affect normal, Judgment normal, Mood normal.         DIAGNOSTIC STUDIES / PROCEDURES    LABS  Results for orders placed or performed during the hospital encounter of 08/24/20   CBC WITH DIFFERENTIAL   Result Value Ref Range    WBC 4.4 (L) 4.8 - 10.8 K/uL    RBC 3.70 (L) 4.20 - 5.40 M/uL    Hemoglobin 12.6 12.0 - 16.0 g/dL    Hematocrit 34.6 (L) 37.0 - 47.0 %    MCV 93.5 81.4 - 97.8 fL    MCH 34.1 (H) 27.0 - 33.0 pg    MCHC 36.4 (H) 33.6 - 35.0 g/dL    RDW 62.8 (H) 35.9 - 50.0 fL    Platelet Count 147 (L) 164 - 446 K/uL    MPV 10.3 9.0 - 12.9 fL    Neutrophils-Polys 62.50 44.00 - 72.00 %    Lymphocytes 19.90 (L) 22.00 - 41.00 %    Monocytes 15.30 (H) 0.00 - 13.40 %    Eosinophils 0.90 0.00 - 6.90 %    Basophils 0.90 0.00 - 1.80 %    Immature Granulocytes 0.50 0.00 - 0.90 %    Nucleated RBC 0.00 /100 WBC    Neutrophils (Absolute) 2.74 2.00 - 7.15 K/uL    Lymphs (Absolute) 0.87 (L) 1.00 - 4.80 K/uL    Monos (Absolute) 0.67 0.00 - 0.85 K/uL    Eos (Absolute) 0.04 0.00 - 0.51 K/uL    Baso (Absolute) 0.04 0.00 - 0.12 K/uL    Immature Granulocytes (abs) 0.02 0.00 - 0.11 K/uL    NRBC (Absolute) 0.00 K/uL   Comp Metabolic Panel   Result Value Ref Range    Sodium 129 (L) 135 - 145 mmol/L    Potassium 2.2 (LL) 3.6 - 5.5 mmol/L    Chloride 77 (L) 96 - 112 mmol/L    Co2 28 20 - 33 mmol/L    Anion Gap 24.0 (H) 7.0 - 16.0    Glucose 64 (L) 65 - 99 mg/dL    Bun 8 8 - 22 mg/dL    Creatinine 0.97 0.50 - 1.40 mg/dL    Calcium 10.1 8.5 - 10.5 mg/dL    AST(SGOT) 336 (H) 12 - 45 U/L    ALT(SGPT) 124 (H) 2 - 50 U/L    Alkaline Phosphatase 220 (H) 30 - 99 U/L    Total Bilirubin 2.9 (H) 0.1 - 1.5 mg/dL    Albumin 4.0 3.2 - 4.9 g/dL    Total Protein 7.5 6.0 - 8.2 g/dL    Globulin 3.5 1.9 - 3.5 g/dL    A-G Ratio 1.1 g/dL   MAGNESIUM   Result Value Ref Range    Magnesium 0.9 (LL) 1.5 - 2.5 mg/dL   ESTIMATED GFR   Result Value Ref Range    GFR If   American >60 >60 mL/min/1.73 m 2    GFR If Non African American 59 (A) >60 mL/min/1.73 m 2   LIPASE   Result Value Ref Range    Lipase 97 (H) 11 - 82 U/L   COVID/SARS CoV-2 PCR    Specimen: Nasopharyngeal; Respirate   Result Value Ref Range    COVID Order Status Received    SARS-CoV-2, PCR (In-House)   Result Value Ref Range    SARS-CoV-2 Source NP Swab    EKG   Result Value Ref Range    Report       [facility] Emergency Dept.    Test Date:  2020  Pt Name:    LAKSHMI DARLING              Department: ER  MRN:        [id number]                      Room:        13  Gender:     Female                       Technician: [id number]  :        1960                   Requested By:HAILY LUCIANO  Order #:    [id number]                    Reading MD:    Measurements  Intervals                                Axis  Rate:       96                           P:          62  MO:         148                          QRS:        14  QRSD:       92                           T:          20  QT:         400  QTc:        506    Interpretive Statements  SINUS RHYTHM  PROBABLE POSTERIOR INFARCT  BORDERLINE PROLONGED QT INTERVAL  Compared to ECG 2019 17:11:12  Myocardial infarct finding now present       All labs reviewed by me.    RADIOLOGY  US-RUQ   Final Result      1.  No acute sonographic abnormality. No gallstones.   2.  Increased hepatic echogenicity, suggestive of steatosis and/or hepatocellular disease.      DX-CHEST-2 VIEWS   Final Result      No acute cardiopulmonary abnormality.      CT-HEAD W/O   Final Result      1. No CT evidence of acute infarct, hemorrhage or mass.   2. Mild to moderate global parenchymal atrophy. Chronic small vessel ischemic changes.        The radiologist's interpretations of all radiological studies have been reviewed by me.    Films have been independently by me      COURSE  Pertinent Labs & Imaging studies reviewed. (See chart for details)    6:53 AM - Patient seen  and examined at bedside for left knee pain. Discussed plan of care. The patient will be medicated with KCL 10 mEq. Ordered for US-RUQ, CT-head without, DX-chest, Lipase, POC UA, CMP, Magnesium, and CBC with differential to evaluate her symptoms.     11:11 AM I discussed the patient's case and the above findings with Dr. Tracey (hospitalist) who believes the patient should be admitted to the ICU for his low potassium.     11:20 AM I discussed the patient's case and the above findings with Dr. Sandoval (intensivist) who agreed to consult on the patient for hospitalization.    12:32 PM - Per nursing staff, the patient is requesting pain medication.    The patient remains critically ill.  Critical care time = 30 minutes in directly providing and coordinating critical care and extensive data review.  No time overlap and excludes procedures.    MEDICAL DECISION MAKING  This is a 59 y.o. female who presents with generalized weakness, multiple falls.  She has a known history of alcoholism.  Head CT showed no injury from the fall, no mass lesions.  Her lab test showed a significantly decreased potassium and magnesium.  Orders have been written for replacement for both of these.    I suspect her electrode imbalance is due to her poor nutrition poor diet and drinking excessive alcohol.  The patient will be admitted for further evaluation and treatment.  Patient's hyperbilirubinemia is unexplained, I believe this is related to hepatocellular disease due to alcohol abuse    DISPOSITION:  Patient will be hospitalized by Dr. Sandoval in critical condition.    Attending Note  Patient was independently evaluated by me. History and physical exam are consistent with the residents note. Potassium and Magnesium are low requiring admission and supplementation. Her LFT and bilirubin are elevated, 3 weeks ago chart review shows normal bilirubin, ultrasound shows no obstructive disorder.     FINAL IMPRESSION  1. Fall, initial encounter    2.  Acute pancreatitis, unspecified complication status, unspecified pancreatitis type    3. Hyperbilirubinemia    4. Hypokalemia    5. Hypomagnesemia         IIva (Scribe), am scribing for, and in the presence of, López Stiles D.O..    Electronically signed by: Iva Moeller (Scribe), 8/24/2020    López GAN D.O. personally performed the services described in this documentation, as scribed by Iva Moeller in my presence, and it is both accurate and complete. C.    The note accurately reflects work and decisions made by me.  López Stiles D.O.  8/24/2020  1:59 PM

## 2020-08-24 NOTE — ED NOTES
Patient on call light, asking to go to the bathroom.  To the bathroom and back with minimal assistance

## 2020-08-24 NOTE — ED NOTES
Med Rec completed per patient   Allergies reviewed  No ORAL antibiotics in last 14 days    Patient stated she hasn't taken medications in 5 months at least

## 2020-08-24 NOTE — ASSESSMENT & PLAN NOTE
Ongoing  Continue CIWA  Continue IV thiamine, folate, and multivitamin.   Following closely  Transfer back to ICU for precedex

## 2020-08-24 NOTE — ED NOTES
Lab called with critical result of Magnesium of 0.9 and potassium of 2.2 at 0855. Critical lab result read back to Lab.   Dr. Stiles notified of critical lab result at 0900.  Critical lab result read back by Dr. Stiles.

## 2020-08-24 NOTE — ED NOTES
Assumed care of pt, report received. Introduced self as pt RN. Pt continually asking for meds. ERP aware. Will place orders. K completed, Thiamine started. Pt aware of plan of care. Will cont to monitor

## 2020-08-24 NOTE — ASSESSMENT & PLAN NOTE
Consistent with history of alcohol abuse, and new diagnosis of hepatitis C  RUQ US: Increased hepatic echogenicity, suggestive of steatosis and/or hepatocellular disease.  Trend, mildly improved today  Avoid hepatotoxins  Monitor synthetic functions

## 2020-08-24 NOTE — ASSESSMENT & PLAN NOTE
With active drinking and no plans for cessation, encouraged cessation   Thiamine IV, vitamins  Titrate Precedex  Add Librium taper

## 2020-08-24 NOTE — PROGRESS NOTES
2 RN Skin Check    2 RN skin check complete with Annetta IRENE.   Devices in place: N/A.  Skin assessed under devices: N\A.  Confirmed pressure ulcers found on: N/A.  New potential pressure ulcers noted on N/A. Wound consult placed No.  The following interventions in place Pillows.    Right posterior hip bruising   bilateral lower extremity bruising   Scrapes on bilateral knees   Abdominal small bruising   All other skin intact

## 2020-08-24 NOTE — ED TRIAGE NOTES
Chief Complaint   Patient presents with   • Fall     Pt reports fall last night, reports right knee pain, bilateral elbow pain, and reports she hit her head. No LOC. ETOH involved.        Pt amb to triage with steady gait for above complaint.   Pt is alert and oriented, speaking in full sentences, follows commands and responds appropriately to questions. NAD. Resp are even and unlabored.  Pt placed in lobby. Pt educated on triage process. Pt encouraged to alert staff for any changes.    Vitals:    08/24/20 0630   BP: 124/88   Pulse: (!) 108   Resp: 18   Temp: 36 °C (96.8 °F)   SpO2: 98%

## 2020-08-25 PROBLEM — E83.39 HYPOPHOSPHATEMIA: Status: ACTIVE | Noted: 2020-08-25

## 2020-08-25 PROBLEM — F10.930 ALCOHOL WITHDRAWAL, UNCOMPLICATED (HCC): Status: ACTIVE | Noted: 2019-04-22

## 2020-08-25 LAB
ALBUMIN SERPL BCP-MCNC: 3 G/DL (ref 3.2–4.9)
ALBUMIN/GLOB SERPL: 1.1 G/DL
ALP SERPL-CCNC: 157 U/L (ref 30–99)
ALT SERPL-CCNC: 88 U/L (ref 2–50)
AMMONIA PLAS-SCNC: 38 UMOL/L (ref 11–45)
ANION GAP SERPL CALC-SCNC: 13 MMOL/L (ref 7–16)
AST SERPL-CCNC: 237 U/L (ref 12–45)
BILIRUB SERPL-MCNC: 1.9 MG/DL (ref 0.1–1.5)
BUN SERPL-MCNC: 11 MG/DL (ref 8–22)
CALCIUM SERPL-MCNC: 8.6 MG/DL (ref 8.5–10.5)
CHLORIDE SERPL-SCNC: 92 MMOL/L (ref 96–112)
CO2 SERPL-SCNC: 30 MMOL/L (ref 20–33)
CORTIS SERPL-MCNC: 7.7 UG/DL (ref 0–23)
CREAT SERPL-MCNC: 0.72 MG/DL (ref 0.5–1.4)
ERYTHROCYTE [DISTWIDTH] IN BLOOD BY AUTOMATED COUNT: 69 FL (ref 35.9–50)
FERRITIN SERPL-MCNC: 339 NG/ML (ref 10–291)
GLOBULIN SER CALC-MCNC: 2.7 G/DL (ref 1.9–3.5)
GLUCOSE SERPL-MCNC: 75 MG/DL (ref 65–99)
HAV IGM SERPL QL IA: ABNORMAL
HBV CORE IGM SER QL: ABNORMAL
HBV SURFACE AG SER QL: ABNORMAL
HCT VFR BLD AUTO: 28.8 % (ref 37–47)
HCV AB SER QL: REACTIVE
HGB BLD-MCNC: 10.1 G/DL (ref 12–16)
MCH RBC QN AUTO: 34.4 PG (ref 27–33)
MCHC RBC AUTO-ENTMCNC: 35.1 G/DL (ref 33.6–35)
MCV RBC AUTO: 98 FL (ref 81.4–97.8)
PHOSPHATE SERPL-MCNC: 1.5 MG/DL (ref 2.5–4.5)
PLATELET # BLD AUTO: 130 K/UL (ref 164–446)
PMV BLD AUTO: 10.5 FL (ref 9–12.9)
POTASSIUM SERPL-SCNC: 2.9 MMOL/L (ref 3.6–5.5)
POTASSIUM SERPL-SCNC: 3.2 MMOL/L (ref 3.6–5.5)
PROT SERPL-MCNC: 5.7 G/DL (ref 6–8.2)
RBC # BLD AUTO: 2.94 M/UL (ref 4.2–5.4)
SODIUM SERPL-SCNC: 135 MMOL/L (ref 135–145)
TSH SERPL DL<=0.005 MIU/L-ACNC: 1.18 UIU/ML (ref 0.38–5.33)
WBC # BLD AUTO: 4.2 K/UL (ref 4.8–10.8)
WBC STL QL MICRO: ABNORMAL

## 2020-08-25 PROCEDURE — 80053 COMPREHEN METABOLIC PANEL: CPT

## 2020-08-25 PROCEDURE — A9270 NON-COVERED ITEM OR SERVICE: HCPCS | Performed by: INTERNAL MEDICINE

## 2020-08-25 PROCEDURE — A9270 NON-COVERED ITEM OR SERVICE: HCPCS | Performed by: NURSE PRACTITIONER

## 2020-08-25 PROCEDURE — 700102 HCHG RX REV CODE 250 W/ 637 OVERRIDE(OP): Performed by: NURSE PRACTITIONER

## 2020-08-25 PROCEDURE — 700105 HCHG RX REV CODE 258: Performed by: INTERNAL MEDICINE

## 2020-08-25 PROCEDURE — 82728 ASSAY OF FERRITIN: CPT

## 2020-08-25 PROCEDURE — 700102 HCHG RX REV CODE 250 W/ 637 OVERRIDE(OP): Performed by: INTERNAL MEDICINE

## 2020-08-25 PROCEDURE — 80074 ACUTE HEPATITIS PANEL: CPT

## 2020-08-25 PROCEDURE — 87798 DETECT AGENT NOS DNA AMP: CPT

## 2020-08-25 PROCEDURE — 99233 SBSQ HOSP IP/OBS HIGH 50: CPT | Performed by: INTERNAL MEDICINE

## 2020-08-25 PROCEDURE — 82140 ASSAY OF AMMONIA: CPT

## 2020-08-25 PROCEDURE — 84100 ASSAY OF PHOSPHORUS: CPT

## 2020-08-25 PROCEDURE — 700111 HCHG RX REV CODE 636 W/ 250 OVERRIDE (IP): Performed by: INTERNAL MEDICINE

## 2020-08-25 PROCEDURE — 85027 COMPLETE CBC AUTOMATED: CPT

## 2020-08-25 PROCEDURE — 770020 HCHG ROOM/CARE - TELE (206)

## 2020-08-25 PROCEDURE — 89055 LEUKOCYTE ASSESSMENT FECAL: CPT

## 2020-08-25 PROCEDURE — 99254 IP/OBS CNSLTJ NEW/EST MOD 60: CPT | Performed by: INTERNAL MEDICINE

## 2020-08-25 PROCEDURE — 82533 TOTAL CORTISOL: CPT

## 2020-08-25 PROCEDURE — 700101 HCHG RX REV CODE 250: Performed by: INTERNAL MEDICINE

## 2020-08-25 PROCEDURE — 87522 HEPATITIS C REVRS TRNSCRPJ: CPT

## 2020-08-25 PROCEDURE — 84443 ASSAY THYROID STIM HORMONE: CPT

## 2020-08-25 PROCEDURE — 83630 LACTOFERRIN FECAL (QUAL): CPT

## 2020-08-25 RX ORDER — LORAZEPAM 1 MG/1
3 TABLET ORAL
Status: DISCONTINUED | OUTPATIENT
Start: 2020-08-25 | End: 2020-08-26

## 2020-08-25 RX ORDER — LORAZEPAM 2 MG/ML
2 INJECTION INTRAMUSCULAR
Status: DISCONTINUED | OUTPATIENT
Start: 2020-08-25 | End: 2020-08-26

## 2020-08-25 RX ORDER — OXYCODONE HYDROCHLORIDE 5 MG/1
5-10 TABLET ORAL EVERY 4 HOURS PRN
Status: DISCONTINUED | OUTPATIENT
Start: 2020-08-25 | End: 2020-08-25

## 2020-08-25 RX ORDER — OMEPRAZOLE 20 MG/1
20 CAPSULE, DELAYED RELEASE ORAL 2 TIMES DAILY
Status: DISCONTINUED | OUTPATIENT
Start: 2020-08-25 | End: 2020-08-30 | Stop reason: HOSPADM

## 2020-08-25 RX ORDER — POTASSIUM CHLORIDE 20 MEQ/1
40 TABLET, EXTENDED RELEASE ORAL ONCE
Status: COMPLETED | OUTPATIENT
Start: 2020-08-25 | End: 2020-08-25

## 2020-08-25 RX ORDER — NICOTINE 21 MG/24HR
21 PATCH, TRANSDERMAL 24 HOURS TRANSDERMAL
Status: DISCONTINUED | OUTPATIENT
Start: 2020-08-25 | End: 2020-08-30 | Stop reason: HOSPADM

## 2020-08-25 RX ORDER — FOLIC ACID 1 MG/1
1 TABLET ORAL DAILY
Status: DISCONTINUED | OUTPATIENT
Start: 2020-08-25 | End: 2020-08-30 | Stop reason: HOSPADM

## 2020-08-25 RX ORDER — ACETAMINOPHEN 325 MG/1
650 TABLET ORAL EVERY 4 HOURS PRN
Status: DISCONTINUED | OUTPATIENT
Start: 2020-08-25 | End: 2020-08-30 | Stop reason: HOSPADM

## 2020-08-25 RX ORDER — LORAZEPAM 2 MG/ML
1.5 INJECTION INTRAMUSCULAR
Status: DISCONTINUED | OUTPATIENT
Start: 2020-08-25 | End: 2020-08-26

## 2020-08-25 RX ORDER — LORAZEPAM 2 MG/ML
1 INJECTION INTRAMUSCULAR
Status: DISCONTINUED | OUTPATIENT
Start: 2020-08-25 | End: 2020-08-26

## 2020-08-25 RX ORDER — LORAZEPAM 2 MG/ML
0.5 INJECTION INTRAMUSCULAR EVERY 4 HOURS PRN
Status: DISCONTINUED | OUTPATIENT
Start: 2020-08-25 | End: 2020-08-26

## 2020-08-25 RX ORDER — CHLORDIAZEPOXIDE HYDROCHLORIDE 25 MG/1
25 CAPSULE, GELATIN COATED ORAL EVERY 6 HOURS
Status: DISCONTINUED | OUTPATIENT
Start: 2020-08-25 | End: 2020-08-26

## 2020-08-25 RX ORDER — LORAZEPAM 1 MG/1
2 TABLET ORAL
Status: DISCONTINUED | OUTPATIENT
Start: 2020-08-25 | End: 2020-08-26

## 2020-08-25 RX ORDER — M-VIT,TX,IRON,MINS/CALC/FOLIC 27MG-0.4MG
1 TABLET ORAL DAILY
Status: DISCONTINUED | OUTPATIENT
Start: 2020-08-25 | End: 2020-08-30 | Stop reason: HOSPADM

## 2020-08-25 RX ORDER — LORAZEPAM 1 MG/1
1 TABLET ORAL EVERY 4 HOURS PRN
Status: DISCONTINUED | OUTPATIENT
Start: 2020-08-25 | End: 2020-08-26

## 2020-08-25 RX ORDER — LORAZEPAM 0.5 MG/1
0.5 TABLET ORAL EVERY 4 HOURS PRN
Status: DISCONTINUED | OUTPATIENT
Start: 2020-08-25 | End: 2020-08-26

## 2020-08-25 RX ORDER — LORAZEPAM 1 MG/1
4 TABLET ORAL
Status: DISCONTINUED | OUTPATIENT
Start: 2020-08-25 | End: 2020-08-26

## 2020-08-25 RX ADMIN — CHLORDIAZEPOXIDE HYDROCHLORIDE 25 MG: 25 CAPSULE ORAL at 16:45

## 2020-08-25 RX ADMIN — LORAZEPAM 2 MG: 1 TABLET ORAL at 20:46

## 2020-08-25 RX ADMIN — POTASSIUM CHLORIDE 40 MEQ: 1500 TABLET, EXTENDED RELEASE ORAL at 00:41

## 2020-08-25 RX ADMIN — LORAZEPAM 2 MG: 1 TABLET ORAL at 14:34

## 2020-08-25 RX ADMIN — HEPARIN SODIUM 5000 UNITS: 5000 INJECTION, SOLUTION INTRAVENOUS; SUBCUTANEOUS at 12:58

## 2020-08-25 RX ADMIN — OMEPRAZOLE 20 MG: 20 CAPSULE, DELAYED RELEASE ORAL at 12:58

## 2020-08-25 RX ADMIN — POTASSIUM PHOSPHATE, MONOBASIC AND POTASSIUM PHOSPHATE, DIBASIC 30 MMOL: 224; 236 INJECTION, SOLUTION, CONCENTRATE INTRAVENOUS at 08:45

## 2020-08-25 RX ADMIN — FOLIC ACID 1 MG: 1 TABLET ORAL at 12:58

## 2020-08-25 RX ADMIN — OMEPRAZOLE 20 MG: 20 CAPSULE, DELAYED RELEASE ORAL at 16:44

## 2020-08-25 RX ADMIN — HEPARIN SODIUM 5000 UNITS: 5000 INJECTION, SOLUTION INTRAVENOUS; SUBCUTANEOUS at 20:46

## 2020-08-25 RX ADMIN — POTASSIUM CHLORIDE 40 MEQ: 1500 TABLET, EXTENDED RELEASE ORAL at 04:41

## 2020-08-25 RX ADMIN — THIAMINE HYDROCHLORIDE 100 MG: 100 INJECTION, SOLUTION INTRAMUSCULAR; INTRAVENOUS at 04:36

## 2020-08-25 RX ADMIN — POTASSIUM CHLORIDE 20 MEQ: 1500 TABLET, EXTENDED RELEASE ORAL at 04:41

## 2020-08-25 RX ADMIN — DIBASIC SODIUM PHOSPHATE, MONOBASIC POTASSIUM PHOSPHATE AND MONOBASIC SODIUM PHOSPHATE 250 MG: 852; 155; 130 TABLET ORAL at 23:22

## 2020-08-25 RX ADMIN — LORAZEPAM 2 MG: 1 TABLET ORAL at 16:45

## 2020-08-25 RX ADMIN — LORAZEPAM 2 MG: 2 INJECTION INTRAMUSCULAR; INTRAVENOUS at 04:47

## 2020-08-25 RX ADMIN — ACETAMINOPHEN 650 MG: 325 TABLET, FILM COATED ORAL at 19:24

## 2020-08-25 RX ADMIN — CHLORDIAZEPOXIDE HYDROCHLORIDE 25 MG: 25 CAPSULE ORAL at 12:58

## 2020-08-25 RX ADMIN — LORAZEPAM 2 MG: 2 INJECTION INTRAMUSCULAR; INTRAVENOUS at 08:16

## 2020-08-25 RX ADMIN — POTASSIUM CHLORIDE 20 MEQ: 1500 TABLET, EXTENDED RELEASE ORAL at 16:44

## 2020-08-25 RX ADMIN — HEPARIN SODIUM 5000 UNITS: 5000 INJECTION, SOLUTION INTRAVENOUS; SUBCUTANEOUS at 04:36

## 2020-08-25 RX ADMIN — LORAZEPAM 2 MG: 1 TABLET ORAL at 22:15

## 2020-08-25 RX ADMIN — CHLORDIAZEPOXIDE HYDROCHLORIDE 25 MG: 25 CAPSULE ORAL at 23:23

## 2020-08-25 RX ADMIN — NICOTINE TRANSDERMAL SYSTEM 21 MG: 21 PATCH, EXTENDED RELEASE TRANSDERMAL at 23:56

## 2020-08-25 RX ADMIN — METOCLOPRAMIDE 10 MG: 10 TABLET ORAL at 19:24

## 2020-08-25 RX ADMIN — MULTIPLE VITAMINS W/ MINERALS TAB 1 TABLET: TAB at 14:36

## 2020-08-25 RX ADMIN — LORAZEPAM 1 MG: 1 TABLET ORAL at 13:39

## 2020-08-25 ASSESSMENT — LIFESTYLE VARIABLES
AGITATION: NORMAL ACTIVITY
ANXIETY: *
NAUSEA AND VOMITING: *
AUDITORY DISTURBANCES: NOT PRESENT
VISUAL DISTURBANCES: NOT PRESENT
HEADACHE, FULLNESS IN HEAD: VERY MILD
HEADACHE, FULLNESS IN HEAD: NOT PRESENT
ANXIETY: MILDLY ANXIOUS
VISUAL DISTURBANCES: NOT PRESENT
NAUSEA AND VOMITING: MILD NAUSEA WITH NO VOMITING
VISUAL DISTURBANCES: NOT PRESENT
AGITATION: *
PAROXYSMAL SWEATS: BARELY PERCEPTIBLE SWEATING. PALMS MOIST
AUDITORY DISTURBANCES: NOT PRESENT
ORIENTATION AND CLOUDING OF SENSORIUM: ORIENTED AND CAN DO SERIAL ADDITIONS
ANXIETY: *
ANXIETY: *
NAUSEA AND VOMITING: MILD NAUSEA WITH NO VOMITING
TOTAL SCORE: 14
NAUSEA AND VOMITING: NO NAUSEA AND NO VOMITING
AUDITORY DISTURBANCES: NOT PRESENT
ANXIETY: *
AUDITORY DISTURBANCES: VERY MILD HARSHNESS OR ABILITY TO FRIGHTEN
NAUSEA AND VOMITING: MILD NAUSEA WITH NO VOMITING
TREMOR: MODERATE TREMOR WITH ARMS EXTENDED
HEADACHE, FULLNESS IN HEAD: NOT PRESENT
AUDITORY DISTURBANCES: NOT PRESENT
TREMOR: *
TOTAL SCORE: 14
AGITATION: *
HEADACHE, FULLNESS IN HEAD: VERY MILD
TOTAL SCORE: 14
ORIENTATION AND CLOUDING OF SENSORIUM: DATE DISORIENTATION BY NO MORE THAN TWO CALENDAR DAYS
TREMOR: *
ORIENTATION AND CLOUDING OF SENSORIUM: DATE DISORIENTATION BY NO MORE THAN TWO CALENDAR DAYS
AGITATION: SOMEWHAT MORE THAN NORMAL ACTIVITY
HEADACHE, FULLNESS IN HEAD: VERY MILD
HEADACHE, FULLNESS IN HEAD: MILD
TREMOR: *
VISUAL DISTURBANCES: NOT PRESENT
AGITATION: *
TOTAL SCORE: 11
ORIENTATION AND CLOUDING OF SENSORIUM: ORIENTED AND CAN DO SERIAL ADDITIONS
AUDITORY DISTURBANCES: VERY MILD HARSHNESS OR ABILITY TO FRIGHTEN
PAROXYSMAL SWEATS: BARELY PERCEPTIBLE SWEATING. PALMS MOIST
VISUAL DISTURBANCES: NOT PRESENT
PAROXYSMAL SWEATS: BARELY PERCEPTIBLE SWEATING. PALMS MOIST
VISUAL DISTURBANCES: NOT PRESENT
AGITATION: SOMEWHAT MORE THAN NORMAL ACTIVITY
PAROXYSMAL SWEATS: BARELY PERCEPTIBLE SWEATING. PALMS MOIST
TOTAL SCORE: 8
ORIENTATION AND CLOUDING OF SENSORIUM: ORIENTED AND CAN DO SERIAL ADDITIONS
TREMOR: MODERATE TREMOR WITH ARMS EXTENDED
TREMOR: MODERATE TREMOR WITH ARMS EXTENDED
NAUSEA AND VOMITING: *
ANXIETY: MILDLY ANXIOUS
PAROXYSMAL SWEATS: NO SWEAT VISIBLE
PAROXYSMAL SWEATS: NO SWEAT VISIBLE
SUBSTANCE_ABUSE: 1
TOTAL SCORE: 4
ORIENTATION AND CLOUDING OF SENSORIUM: ORIENTED AND CAN DO SERIAL ADDITIONS

## 2020-08-25 ASSESSMENT — ENCOUNTER SYMPTOMS
BLURRED VISION: 0
SPUTUM PRODUCTION: 0
NERVOUS/ANXIOUS: 1
SENSORY CHANGE: 0
STRIDOR: 0
WEIGHT LOSS: 1
COUGH: 0
FEVER: 0
DIZZINESS: 0
MYALGIAS: 0
SHORTNESS OF BREATH: 0
NAUSEA: 1
HEADACHES: 1
WEAKNESS: 1
ABDOMINAL PAIN: 0
TREMORS: 1
FOCAL WEAKNESS: 0
CHILLS: 0
VOMITING: 1

## 2020-08-25 ASSESSMENT — FIBROSIS 4 INDEX: FIB4 SCORE: 11.08

## 2020-08-25 NOTE — CONSULTS
Hospital Medicine Consultation    Date of Service  8/25/2020    Referring Physician  Matthew Stack M.D.    Consulting Physician  Curtis Maddox M.D.    Reason for Consultation  Medical management    History of Presenting Illness  59 y.o. female with alcohol dependence, history of CHF, hypertension, who presented 8/24/2020 with ground-level fall, resultant knee pain.  She states she has not been eating much for 1 week, due to significant nausea and vomiting.  Initial work-up showed significant hypokalemia and hypomagnesemia.  Her potassium was 2.2, with sodium of 129, and magnesium of 0.9.  She is given IV potassium and magnesium replacement.  She did have elevated liver enzymes, and right upper quadrant ultrasound showed increased hepatic echogenicity suggestive of steatosis.  She is monitored for alcohol withdrawal.     With replacement, her potassium improved, and her sodium have normalized.  This morning, she has a potassium of 3.2, and sodium of 135.  Her magnesium level improved to 2.6.  She initially had a CIWA score of 17, and with benzodiazepine dosing, has gone down to 4 today. States her last drink was on the day prior to admission. She is still tremulous. She states she is not nauseated anymore, but still has ongoing diarrhea. No abd pain. No CP, SOB. No fevers or chills.      She is now ready to transfer out of the ICU.     Review of Systems  ROS     Pertinent positives/negatives as mentioned above.     A complete review of systems was personally done by me. All other systems were negative.       Past Medical History   has a past medical history of Alcoholism ([facility]), Anxiety, Arthritis, ASTHMA, Cancer ([facility]) (1981), Chronic low back pain, Congestive heart failure (HCC), Depression, EtOH dependence ([facility]), Fall, GERD (gastroesophageal reflux disease), HTN, Hypertension, Indigestion, Muscle disorder, OSTEOPOROSIS, Other specified symptom associated with female genital organs, Psychiatric disorder, PTSD  (post-traumatic stress disorder), Renal disorder, Seizure (HCC), Ulcer, and Vitamin D deficiency. She also has no past medical history of Heart murmur.    Surgical History   has a past surgical history that includes hernia repair (1977); cysto stent placemnt pre surg (10/7/2010); cystoscopy stent placement (11/9/2010); ureteroscopy (11/9/2010); lasertripsy (11/9/2010); stent removal (11/9/2010); and gastroscopy-endo (N/A, 10/3/2018).    Family History  family history includes Cancer in her paternal grandmother; Depression in an other family member; Diabetes in her father; Heart Disease in her father; Hypertension in her father.    Social History   reports that she has been smoking cigarettes. She has a 10.00 pack-year smoking history. She has never used smokeless tobacco. She reports current alcohol use. She reports that she does not use drugs.    Medications  None       Allergies  Allergies   Allergen Reactions   • Sulfa Drugs Vomiting   • Toradol Vomiting       Physical Exam  Temp:  [36.2 °C (97.2 °F)-36.7 °C (98.1 °F)] 36.7 °C (98.1 °F)  Pulse:  [] 92  Resp:  [13-26] 26  BP: ()/(45-87) 123/73  SpO2:  [90 %-100 %] 99 %    Physical Exam  Vitals signs reviewed.   Constitutional:       General: She is not in acute distress.     Appearance: Normal appearance. She is normal weight. She is not ill-appearing or diaphoretic.   HENT:      Head: Normocephalic and atraumatic.      Right Ear: External ear normal.      Left Ear: External ear normal.      Mouth/Throat:      Mouth: Mucous membranes are moist.      Pharynx: No oropharyngeal exudate or posterior oropharyngeal erythema.   Eyes:      General: No scleral icterus.     Extraocular Movements: Extraocular movements intact.      Conjunctiva/sclera: Conjunctivae normal.      Pupils: Pupils are equal, round, and reactive to light.   Neck:      Musculoskeletal: Normal range of motion and neck supple. No neck rigidity or muscular tenderness.   Cardiovascular:       Rate and Rhythm: Normal rate and regular rhythm.      Heart sounds: Normal heart sounds. No murmur.   Pulmonary:      Effort: Pulmonary effort is normal. No respiratory distress.      Breath sounds: Normal breath sounds. No stridor. No wheezing, rhonchi or rales.   Chest:      Chest wall: No tenderness.   Abdominal:      General: Bowel sounds are normal. There is no distension.      Palpations: Abdomen is soft. There is no mass.      Tenderness: There is no abdominal tenderness. There is no guarding or rebound.   Musculoskeletal: Normal range of motion.         General: No swelling.   Lymphadenopathy:      Cervical: No cervical adenopathy.   Skin:     General: Skin is warm and dry.      Coloration: Skin is not jaundiced.      Findings: No rash.   Neurological:      General: No focal deficit present.      Mental Status: She is alert and oriented to person, place, and time. Mental status is at baseline.      Cranial Nerves: No cranial nerve deficit.      Comments: tremulous   Psychiatric:         Mood and Affect: Mood normal.         Behavior: Behavior normal.         Thought Content: Thought content normal.         Judgment: Judgment normal.         Fluids  Date 08/25/20 0700 - 08/26/20 0659   Shift 0047-7969 0489-8654 [id number] 24 Hour Total   INTAKE   IV Piggyback 270.7   270.7   Shift Total 270.7   270.7   OUTPUT   Shift Total       Weight (kg) 69.6 69.6 69.6 69.6       Laboratory  Recent Labs     08/24/20  0807 08/25/20  0310   WBC 4.4* 4.2*   RBC 3.70* 2.94*   HEMOGLOBIN 12.6 10.1*   HEMATOCRIT 34.6* 28.8*   MCV 93.5 98.0*   MCH 34.1* 34.4*   MCHC 36.4* 35.1*   RDW 62.8* 69.0*   PLATELETCT 147* 130*   MPV 10.3 10.5     Recent Labs     08/24/20  0807 08/24/20  2004 08/24/20 2328 08/25/20  0310   SODIUM 129* 131*  --  135   POTASSIUM 2.2* 2.5* 2.9* 3.2*   CHLORIDE 77* 88*  --  92*   CO2 28 31  --  30   GLUCOSE 64* 130*  --  75   BUN 8 10  --  11   CREATININE 0.97 0.95  --  0.72   CALCIUM 10.1 8.8  --  8.6                      Imaging  US-RUQ   Final Result      1.  No acute sonographic abnormality. No gallstones.   2.  Increased hepatic echogenicity, suggestive of steatosis and/or hepatocellular disease.      DX-CHEST-2 VIEWS   Final Result      No acute cardiopulmonary abnormality.      CT-HEAD W/O   Final Result      1. No CT evidence of acute infarct, hemorrhage or mass.   2. Mild to moderate global parenchymal atrophy. Chronic small vessel ischemic changes.          Assessment/Plan  * Hypokalemia- (present on admission)  Assessment & Plan  - replaced. Low Mg replaced as well. Trend. BMP in AM.    Alcohol withdrawal, uncomplicated (HCC)- (present on admission)  Assessment & Plan  - still tremulous. Last drink was 2 days ago. Can still worsen.   - will start her on scheduled libirum 25mg q6H for now, taper with improvement. Continue PRN ativan per [facility] protocol.   - continue IV thiamine. Start folate, and multivitamin.   - monitor closely, as can worsen up to 5 days from last drink. Low threshold to return back to ICU if worsening withdrawal.    Hypomagnesemia- (present on admission)  Assessment & Plan  - replaced. Mg level has normalized. Repeat levels in the morning.     Hypophosphatemia- (present on admission)  Assessment & Plan  - replaced with IV Kphos. Will start her on kphos neutral PO q6H x 3 days starting tomorrow. Repeat levels tomorrow morning.     Hyponatremia- (present on admission)  Assessment & Plan  - sodium has normalized. Continue to trend.    Elevated liver enzymes- (present on admission)  Assessment & Plan  - likely due to alcohol dependence. US showed steatosis.   - for completion, will send for further workup with viral hepatitis panel, CPK, ferritin, TSH, cortisol.   - trend LFTs.    Diarrhea- (present on admission)  Assessment & Plan  - suspect viral AGE. However, will send for stool C diff, O&P, stool WBC, lactoferrin, norwalk PCR.   - Hold off on imodium/lomotil until C diff ruled-out.

## 2020-08-25 NOTE — PROGRESS NOTES
Lab called with critical result for potassium of 2.5.  MD notified with new orders for one time dose of 80mEq of potassium chloride.  Will continue to monitor.

## 2020-08-25 NOTE — PROGRESS NOTES
"Critical Care Progress Note    Date of admission  8/24/2020    Chief Complaint  59 y.o. female admitted 8/24/2020 with upper natremia and hypomagnesemia, falls    Hospital Course    \"59 y.o. female who presented 8/24/2020 with a ground-level fall and resultant knee pain.  She states that she has not been able to eat much this week and is at significant nausea and vomiting.  She started feeling weakness in her legs as well as a tingling sensation but denies focal deficits.  Nothing makes it better or worse. Denies fevers, chills, headache, loss of consciousness, chest pain, shortness of breath.     In the emergency department she was found to be hypokalemic and hypomagnesemic.  ICU consulted given the profound nature of these electrolyte disturbances and symptoms.\"      Interval Problem Update  Reviewed last 24 hour events:   - Potassium improved to 3.2 and magnesium 2.6   - Neuro: Alert and oriented x4   - HR: 80s to 110s   - SBP: 110s to 130s   - GI: Tolerating diet   - UOP: Poorly measured   - Velásquez: No   - Tm: 36.7 °C   - Lines: Peripheral IV   - PPx: GI not indicated, DVT heparin   - 3L NC   - CXR (personally reviewed and compared to prior): No new    Review of Systems  Review of Systems   Constitutional: Positive for weight loss. Negative for chills and fever.   Eyes: Negative for blurred vision.   Respiratory: Negative for cough, sputum production, shortness of breath and stridor.    Cardiovascular: Negative for chest pain.   Gastrointestinal: Positive for nausea and vomiting. Negative for abdominal pain.   Genitourinary: Negative for dysuria.   Musculoskeletal: Negative for myalgias.   Skin: Negative for rash.   Neurological: Positive for tremors, weakness and headaches. Negative for dizziness, sensory change and focal weakness.   Psychiatric/Behavioral: Positive for substance abuse. The patient is nervous/anxious.         Vital Signs for last 24 hours   Temp:  [36.2 °C (97.2 °F)-36.7 °C (98.1 °F)] 36.7 °C " (98.1 °F)  Pulse:  [] 88  Resp:  [13-26] 26  BP: ()/(45-87) 114/76  SpO2:  [90 %-100 %] 98 %    Hemodynamic parameters for last 24 hours       Respiratory Information for the last 24 hours       Physical Exam   Physical Exam  Vitals signs and nursing note reviewed.   Constitutional:       General: She is not in acute distress.     Appearance: She is ill-appearing. She is not toxic-appearing.   HENT:      Head: Normocephalic and atraumatic.      Right Ear: External ear normal.      Left Ear: External ear normal.      Nose: Nose normal.      Mouth/Throat:      Mouth: Mucous membranes are dry.   Eyes:      Extraocular Movements: Extraocular movements intact.      Pupils: Pupils are equal, round, and reactive to light.   Cardiovascular:      Rate and Rhythm: Normal rate and regular rhythm.      Pulses: Normal pulses.      Heart sounds: Normal heart sounds.   Pulmonary:      Effort: Pulmonary effort is normal.      Breath sounds: Normal breath sounds.   Abdominal:      General: Abdomen is flat.      Tenderness: There is no guarding or rebound.   Musculoskeletal: Normal range of motion.         General: No swelling, tenderness, deformity or signs of injury.      Right lower leg: No edema.      Left lower leg: No edema.   Skin:     General: Skin is warm and dry.      Capillary Refill: Capillary refill takes less than 2 seconds.   Neurological:      General: No focal deficit present.      Mental Status: She is alert and oriented to person, place, and time.      GCS: GCS eye subscore is 4. GCS verbal subscore is 5. GCS motor subscore is 6.      Cranial Nerves: No cranial nerve deficit.      Sensory: No sensory deficit.      Motor: Tremor present. No weakness.      Coordination: Coordination abnormal.   Psychiatric:         Mood and Affect: Mood normal.         Thought Content: Thought content normal.         Medications  Current Facility-Administered Medications   Medication Dose Route Frequency Provider Last  Rate Last Dose   • MD Alert...ICU Electrolyte Replacement per Pharmacy   Other PHARMACY TO DOSE Jim Sandoval M.D.       • senna-docusate (PERICOLACE or SENOKOT S) 8.6-50 MG per tablet 2 Tab  2 Tab Oral BID Jim Sandoval M.D.        And   • polyethylene glycol/lytes (MIRALAX) PACKET 1 Packet  1 Packet Oral QDAY PRN Jim Sandoval M.D.        And   • magnesium hydroxide (MILK OF MAGNESIA) suspension 30 mL  30 mL Oral QDAY PRN Jim Sandoval M.D.        And   • bisacodyl (DULCOLAX) suppository 10 mg  10 mg Rectal QDAY PRN Jim Sandoval M.D.       • heparin injection 5,000 Units  5,000 Units Subcutaneous Q8HRS Jim Sandoval M.D.   5,000 Units at 08/25/20 0436   • promethazine (PHENERGAN) tablet 12.5-25 mg  12.5-25 mg Oral Q4HRS PRN Jim Sandoval M.D.       • promethazine (PHENERGAN) suppository 12.5-25 mg  12.5-25 mg Rectal Q4HRS PRN Jim Sandoval M.D.       • prochlorperazine (COMPAZINE) injection 5-10 mg  5-10 mg Intravenous Q4HRS PRN Jim Sandoval M.D.   10 mg at 08/24/20 2132   • thiamine (B-1) IVPB 100 mg in 100 mL D5W (premix)  100 mg Intravenous DAILY Jim Sandoval M.D.   Stopped at 08/25/20 0506   • potassium chloride SA (Kdur) tablet 20 mEq  20 mEq Oral BID Ervin Arroyo Jr., D.O.   20 mEq at 08/25/20 0441   • LORazepam (ATIVAN) injection 4 mg  4 mg Intravenous Q15 MIN PRN Ervin Arroyo Jr. D.O.        Or   • LORazepam (ATIVAN) injection 3 mg  3 mg Intravenous Q15 MIN PRN Ervin Arroyo Jr. D.O.        Or   • LORazepam (ATIVAN) injection 2 mg  2 mg Intravenous Q15 MIN PRN Ervin Arroyo Jr., D.O.   2 mg at 08/25/20 0447    Or   • LORazepam (ATIVAN) injection 1 mg  1 mg Intravenous Q15 MIN PRN Ervin Arroyo Jr., D.O.   1 mg at 08/24/20 1652   • metoclopramide (REGLAN) tablet 10 mg  10 mg Oral ACHS PRN Ervin Arroyo Jr., D.O.   10 mg at 08/24/20 1518       Fluids    Intake/Output Summary (Last 24 hours) at 8/25/2020 0814  Last data filed at 8/25/2020 0800  Gross  per 24 hour   Intake 1686.66 ml   Output 300 ml   Net 1386.66 ml       Laboratory          Recent Labs     08/24/20 0807 08/24/20 2004 08/24/20 2328 08/25/20 0310   SODIUM 129* 131*  --  135   POTASSIUM 2.2* 2.5* 2.9* 3.2*   CHLORIDE 77* 88*  --  92*   CO2 28 31  --  30   BUN 8 10  --  11   CREATININE 0.97 0.95  --  0.72   MAGNESIUM 0.9* 2.6*  --   --    PHOSPHORUS  --   --   --  1.5*   CALCIUM 10.1 8.8  --  8.6     Recent Labs     08/24/20 0807 08/24/20 2004 08/25/20 0310   ALTSGPT 124*  --  88*   ASTSGOT 336*  --  237*   ALKPHOSPHAT 220*  --  157*   TBILIRUBIN 2.9*  --  1.9*   LIPASE 97*  --   --    GLUCOSE 64* 130* 75     Recent Labs     08/24/20 0807 08/25/20 0310   WBC 4.4* 4.2*   NEUTSPOLYS 62.50  --    LYMPHOCYTES 19.90*  --    MONOCYTES 15.30*  --    EOSINOPHILS 0.90  --    BASOPHILS 0.90  --    ASTSGOT 336* 237*   ALTSGPT 124* 88*   ALKPHOSPHAT 220* 157*   TBILIRUBIN 2.9* 1.9*     Recent Labs     08/24/20 0807 08/25/20 0310   RBC 3.70* 2.94*   HEMOGLOBIN 12.6 10.1*   HEMATOCRIT 34.6* 28.8*   PLATELETCT 147* 130*       Imaging  X-Ray:  I have personally reviewed the images and compared with prior images.  CT:    Reviewed    Assessment/Plan  * Hypokalemia- (present on admission)  Assessment & Plan  No ECG changes but symptomatic, initial level 2.2  Repleated to 3.2 this AM  Consistent with history of poor po intake    Alcohol withdrawal, uncomplicated (HCC)- (present on admission)  Assessment & Plan  Active drinking with no plans for cessation, encouraged cessation   Thiamine IV, vitamins  CIWA based lorazepam    Hypomagnesemia- (present on admission)  Assessment & Plan  Secondary to alcoholism and poor po intake  Repleated to 2.6    Hypophosphatemia- (present on admission)  Assessment & Plan  Kphos ordered    Hyponatremia- (present on admission)  Assessment & Plan  129 upon admission - clinically and by history dehydrated  Normalized    Elevated liver enzymes- (present on admission)  Assessment &  Plan  Consistent with history of alcohol abuse, routine monitoring for now  RUQ US: Increased hepatic echogenicity, suggestive of steatosis and/or hepatocellular disease.  Trend  Avoid hepatotoxins  Monitor synthetic functions    Diarrhea- (present on admission)  Assessment & Plan  Low risk for C diff       VTE:  Heparin  Ulcer: Not Indicated  Lines: None    I have performed a physical exam and reviewed and updated ROS and Plan today (8/25/2020). In review of yesterday's note (8/24/2020), there are no changes except as documented above.     Patient stable to be transferred out of ICU today to [facility].  I have discussed this case with hospitalist Dr. Stack, he will assign a hospitalist to assume care at this time. [facility] will sign off. Please call with any questions.    Discussed patient condition and risk of morbidity and/or mortality with Hospitalist, RN, RT, Pharmacy, Dietary, , Charge nurse / hot rounds and Patient

## 2020-08-25 NOTE — PROGRESS NOTES
Triage officer:  Dr. Arroyo, critical care, has placed transfer orders on Ms. Marquis [id number]. She has a hx of EtOH abuse that presented with vomiting and diarrhea and was found to have severely low K of 2.2.   Dr. Maddox to consult from Hospitalist service.

## 2020-08-26 ENCOUNTER — APPOINTMENT (OUTPATIENT)
Dept: RADIOLOGY | Facility: MEDICAL CENTER | Age: 60
DRG: 641 | End: 2020-08-26
Attending: INTERNAL MEDICINE
Payer: MEDICAID

## 2020-08-26 PROBLEM — E87.1 HYPONATREMIA: Status: RESOLVED | Noted: 2020-08-24 | Resolved: 2020-08-26

## 2020-08-26 PROBLEM — F10.931 ALCOHOL WITHDRAWAL DELIRIUM, ACUTE, HYPERACTIVE (HCC): Status: ACTIVE | Noted: 2019-04-22

## 2020-08-26 LAB
ALBUMIN SERPL BCP-MCNC: 3.3 G/DL (ref 3.2–4.9)
ALP SERPL-CCNC: 191 U/L (ref 30–99)
ALT SERPL-CCNC: 103 U/L (ref 2–50)
ANION GAP SERPL CALC-SCNC: 13 MMOL/L (ref 7–16)
ANISOCYTOSIS BLD QL SMEAR: ABNORMAL
APPEARANCE UR: CLEAR
AST SERPL-CCNC: 289 U/L (ref 12–45)
BACTERIA #/AREA URNS HPF: ABNORMAL /HPF
BASOPHILS # BLD AUTO: 5.3 % (ref 0–1.8)
BASOPHILS # BLD: 0.19 K/UL (ref 0–0.12)
BILIRUB CONJ SERPL-MCNC: 0.7 MG/DL (ref 0.1–0.5)
BILIRUB INDIRECT SERPL-MCNC: 0.6 MG/DL (ref 0–1)
BILIRUB SERPL-MCNC: 1.3 MG/DL (ref 0.1–1.5)
BILIRUB UR QL STRIP.AUTO: NEGATIVE
BUN SERPL-MCNC: 11 MG/DL (ref 8–22)
C DIFF DNA SPEC QL NAA+PROBE: NEGATIVE
C DIFF TOX GENS STL QL NAA+PROBE: NEGATIVE
CALCIUM SERPL-MCNC: 8.6 MG/DL (ref 8.5–10.5)
CHLORIDE SERPL-SCNC: 96 MMOL/L (ref 96–112)
CO2 SERPL-SCNC: 27 MMOL/L (ref 20–33)
COLOR UR: YELLOW
CREAT SERPL-MCNC: 0.71 MG/DL (ref 0.5–1.4)
EOSINOPHIL # BLD AUTO: 0.06 K/UL (ref 0–0.51)
EOSINOPHIL NFR BLD: 1.8 % (ref 0–6.9)
EPI CELLS #/AREA URNS HPF: ABNORMAL /HPF
ERYTHROCYTE [DISTWIDTH] IN BLOOD BY AUTOMATED COUNT: 78.3 FL (ref 35.9–50)
G LAMBLIA+C PARVUM AG STL QL RAPID: NORMAL
GLUCOSE SERPL-MCNC: 95 MG/DL (ref 65–99)
GLUCOSE UR STRIP.AUTO-MCNC: NEGATIVE MG/DL
HCT VFR BLD AUTO: 31.3 % (ref 37–47)
HGB BLD-MCNC: 10.4 G/DL (ref 12–16)
HYALINE CASTS #/AREA URNS LPF: ABNORMAL /LPF
KETONES UR STRIP.AUTO-MCNC: NEGATIVE MG/DL
LEUKOCYTE ESTERASE UR QL STRIP.AUTO: ABNORMAL
LYMPHOCYTES # BLD AUTO: 0.82 K/UL (ref 1–4.8)
LYMPHOCYTES NFR BLD: 22.8 % (ref 22–41)
MACROCYTES BLD QL SMEAR: ABNORMAL
MAGNESIUM SERPL-MCNC: 1.2 MG/DL (ref 1.5–2.5)
MANUAL DIFF BLD: NORMAL
MCH RBC QN AUTO: 34.1 PG (ref 27–33)
MCHC RBC AUTO-ENTMCNC: 33.2 G/DL (ref 33.6–35)
MCV RBC AUTO: 102.6 FL (ref 81.4–97.8)
MICRO URNS: ABNORMAL
MONOCYTES # BLD AUTO: 0.13 K/UL (ref 0–0.85)
MONOCYTES NFR BLD AUTO: 3.5 % (ref 0–13.4)
MORPHOLOGY BLD-IMP: NORMAL
NEUTROPHILS # BLD AUTO: 2.4 K/UL (ref 2–7.15)
NEUTROPHILS NFR BLD: 66.7 % (ref 44–72)
NITRITE UR QL STRIP.AUTO: NEGATIVE
NRBC # BLD AUTO: 0 K/UL
NRBC BLD-RTO: 0 /100 WBC
PH UR STRIP.AUTO: 8 [PH] (ref 5–8)
PHOSPHATE SERPL-MCNC: 1.6 MG/DL (ref 2.5–4.5)
PLATELET # BLD AUTO: 156 K/UL (ref 164–446)
PLATELET BLD QL SMEAR: NORMAL
PMV BLD AUTO: 10.7 FL (ref 9–12.9)
POIKILOCYTOSIS BLD QL SMEAR: NORMAL
POLYCHROMASIA BLD QL SMEAR: NORMAL
POTASSIUM SERPL-SCNC: 3.6 MMOL/L (ref 3.6–5.5)
PROT SERPL-MCNC: 6.2 G/DL (ref 6–8.2)
PROT UR QL STRIP: NEGATIVE MG/DL
RBC # BLD AUTO: 3.05 M/UL (ref 4.2–5.4)
RBC # URNS HPF: ABNORMAL /HPF
RBC BLD AUTO: PRESENT
RBC UR QL AUTO: NEGATIVE
SIGNIFICANT IND 70042: NORMAL
SITE SITE: NORMAL
SODIUM SERPL-SCNC: 136 MMOL/L (ref 135–145)
SOURCE SOURCE: NORMAL
SP GR UR STRIP.AUTO: 1.01
STOMATOCYTES BLD QL SMEAR: NORMAL
UROBILINOGEN UR STRIP.AUTO-MCNC: 1 MG/DL
WBC # BLD AUTO: 3.6 K/UL (ref 4.8–10.8)
WBC #/AREA URNS HPF: ABNORMAL /HPF

## 2020-08-26 PROCEDURE — 99233 SBSQ HOSP IP/OBS HIGH 50: CPT | Performed by: HOSPITALIST

## 2020-08-26 PROCEDURE — 700111 HCHG RX REV CODE 636 W/ 250 OVERRIDE (IP): Performed by: INTERNAL MEDICINE

## 2020-08-26 PROCEDURE — 80076 HEPATIC FUNCTION PANEL: CPT

## 2020-08-26 PROCEDURE — A9270 NON-COVERED ITEM OR SERVICE: HCPCS | Performed by: INTERNAL MEDICINE

## 2020-08-26 PROCEDURE — 700102 HCHG RX REV CODE 250 W/ 637 OVERRIDE(OP): Performed by: INTERNAL MEDICINE

## 2020-08-26 PROCEDURE — 87045 FECES CULTURE AEROBIC BACT: CPT

## 2020-08-26 PROCEDURE — 99291 CRITICAL CARE FIRST HOUR: CPT | Performed by: INTERNAL MEDICINE

## 2020-08-26 PROCEDURE — 85007 BL SMEAR W/DIFF WBC COUNT: CPT

## 2020-08-26 PROCEDURE — 700105 HCHG RX REV CODE 258: Performed by: INTERNAL MEDICINE

## 2020-08-26 PROCEDURE — 87209 SMEAR COMPLEX STAIN: CPT

## 2020-08-26 PROCEDURE — 84100 ASSAY OF PHOSPHORUS: CPT

## 2020-08-26 PROCEDURE — 87329 GIARDIA AG IA: CPT

## 2020-08-26 PROCEDURE — 700111 HCHG RX REV CODE 636 W/ 250 OVERRIDE (IP): Performed by: HOSPITALIST

## 2020-08-26 PROCEDURE — 85027 COMPLETE CBC AUTOMATED: CPT

## 2020-08-26 PROCEDURE — 87177 OVA AND PARASITES SMEARS: CPT

## 2020-08-26 PROCEDURE — 87899 AGENT NOS ASSAY W/OPTIC: CPT

## 2020-08-26 PROCEDURE — 81001 URINALYSIS AUTO W/SCOPE: CPT

## 2020-08-26 PROCEDURE — 87046 STOOL CULTR AEROBIC BACT EA: CPT

## 2020-08-26 PROCEDURE — 83735 ASSAY OF MAGNESIUM: CPT

## 2020-08-26 PROCEDURE — 87493 C DIFF AMPLIFIED PROBE: CPT

## 2020-08-26 PROCEDURE — 05HD33Z INSERTION OF INFUSION DEVICE INTO RIGHT CEPHALIC VEIN, PERCUTANEOUS APPROACH: ICD-10-PCS | Performed by: HOSPITALIST

## 2020-08-26 PROCEDURE — 80048 BASIC METABOLIC PNL TOTAL CA: CPT

## 2020-08-26 PROCEDURE — 770022 HCHG ROOM/CARE - ICU (200)

## 2020-08-26 PROCEDURE — 76937 US GUIDE VASCULAR ACCESS: CPT

## 2020-08-26 PROCEDURE — 700101 HCHG RX REV CODE 250: Performed by: INTERNAL MEDICINE

## 2020-08-26 PROCEDURE — 87328 CRYPTOSPORIDIUM AG IA: CPT

## 2020-08-26 RX ORDER — HALOPERIDOL 5 MG/ML
2.5 INJECTION INTRAMUSCULAR
Status: DISCONTINUED | OUTPATIENT
Start: 2020-08-26 | End: 2020-08-30 | Stop reason: HOSPADM

## 2020-08-26 RX ORDER — LORAZEPAM 2 MG/ML
2 INJECTION INTRAMUSCULAR EVERY 4 HOURS
Status: DISCONTINUED | OUTPATIENT
Start: 2020-08-26 | End: 2020-08-29

## 2020-08-26 RX ORDER — MAGNESIUM SULFATE HEPTAHYDRATE 40 MG/ML
4 INJECTION, SOLUTION INTRAVENOUS ONCE
Status: COMPLETED | OUTPATIENT
Start: 2020-08-26 | End: 2020-08-26

## 2020-08-26 RX ORDER — LORAZEPAM 2 MG/ML
2 INJECTION INTRAMUSCULAR ONCE
Status: COMPLETED | OUTPATIENT
Start: 2020-08-26 | End: 2020-08-26

## 2020-08-26 RX ORDER — ZIPRASIDONE MESYLATE 20 MG/ML
10 INJECTION, POWDER, LYOPHILIZED, FOR SOLUTION INTRAMUSCULAR ONCE
Status: DISCONTINUED | OUTPATIENT
Start: 2020-08-26 | End: 2020-08-26

## 2020-08-26 RX ORDER — LORAZEPAM 2 MG/ML
1-2 INJECTION INTRAMUSCULAR
Status: DISCONTINUED | OUTPATIENT
Start: 2020-08-26 | End: 2020-08-30 | Stop reason: HOSPADM

## 2020-08-26 RX ORDER — DEXMEDETOMIDINE HYDROCHLORIDE 4 UG/ML
.1-1 INJECTION, SOLUTION INTRAVENOUS CONTINUOUS
Status: DISCONTINUED | OUTPATIENT
Start: 2020-08-26 | End: 2020-08-30

## 2020-08-26 RX ADMIN — LORAZEPAM 2 MG: 2 INJECTION INTRAMUSCULAR; INTRAVENOUS at 09:50

## 2020-08-26 RX ADMIN — POTASSIUM CHLORIDE 20 MEQ: 1500 TABLET, EXTENDED RELEASE ORAL at 05:50

## 2020-08-26 RX ADMIN — LORAZEPAM 4 MG: 1 TABLET ORAL at 12:16

## 2020-08-26 RX ADMIN — LORAZEPAM 2 MG: 1 TABLET ORAL at 00:22

## 2020-08-26 RX ADMIN — LORAZEPAM 2 MG: 1 TABLET ORAL at 03:00

## 2020-08-26 RX ADMIN — LORAZEPAM 2 MG: 2 INJECTION INTRAMUSCULAR; INTRAVENOUS at 17:37

## 2020-08-26 RX ADMIN — CHLORDIAZEPOXIDE HYDROCHLORIDE 25 MG: 25 CAPSULE ORAL at 12:35

## 2020-08-26 RX ADMIN — OMEPRAZOLE 20 MG: 20 CAPSULE, DELAYED RELEASE ORAL at 05:50

## 2020-08-26 RX ADMIN — CHLORDIAZEPOXIDE HYDROCHLORIDE 25 MG: 25 CAPSULE ORAL at 05:50

## 2020-08-26 RX ADMIN — LORAZEPAM 2 MG: 2 INJECTION INTRAMUSCULAR; INTRAVENOUS at 12:00

## 2020-08-26 RX ADMIN — HEPARIN SODIUM 5000 UNITS: 5000 INJECTION, SOLUTION INTRAVENOUS; SUBCUTANEOUS at 12:36

## 2020-08-26 RX ADMIN — MAGNESIUM SULFATE IN WATER 4 G: 40 INJECTION, SOLUTION INTRAVENOUS at 08:50

## 2020-08-26 RX ADMIN — LORAZEPAM 1.5 MG: 2 INJECTION INTRAMUSCULAR; INTRAVENOUS at 07:37

## 2020-08-26 RX ADMIN — THIAMINE HYDROCHLORIDE 100 MG: 100 INJECTION, SOLUTION INTRAMUSCULAR; INTRAVENOUS at 06:01

## 2020-08-26 RX ADMIN — LORAZEPAM 2 MG: 2 INJECTION INTRAMUSCULAR; INTRAVENOUS at 10:50

## 2020-08-26 RX ADMIN — LORAZEPAM 2 MG: 2 INJECTION INTRAMUSCULAR; INTRAVENOUS at 21:53

## 2020-08-26 RX ADMIN — LORAZEPAM 4 MG: 1 TABLET ORAL at 12:59

## 2020-08-26 RX ADMIN — MULTIPLE VITAMINS W/ MINERALS TAB 1 TABLET: TAB at 05:50

## 2020-08-26 RX ADMIN — HEPARIN SODIUM 5000 UNITS: 5000 INJECTION, SOLUTION INTRAVENOUS; SUBCUTANEOUS at 05:50

## 2020-08-26 RX ADMIN — LORAZEPAM 1.5 MG: 2 INJECTION INTRAMUSCULAR; INTRAVENOUS at 05:16

## 2020-08-26 RX ADMIN — LORAZEPAM 1.5 MG: 2 INJECTION INTRAMUSCULAR; INTRAVENOUS at 04:38

## 2020-08-26 RX ADMIN — FOLIC ACID 1 MG: 1 TABLET ORAL at 05:49

## 2020-08-26 RX ADMIN — LORAZEPAM 2 MG: 2 INJECTION INTRAMUSCULAR; INTRAVENOUS at 14:00

## 2020-08-26 RX ADMIN — HEPARIN SODIUM 5000 UNITS: 5000 INJECTION, SOLUTION INTRAVENOUS; SUBCUTANEOUS at 21:53

## 2020-08-26 RX ADMIN — LORAZEPAM 4 MG: 1 TABLET ORAL at 12:35

## 2020-08-26 RX ADMIN — LORAZEPAM 1.5 MG: 2 INJECTION INTRAMUSCULAR; INTRAVENOUS at 08:50

## 2020-08-26 RX ADMIN — HALOPERIDOL LACTATE 2.5 MG: 5 INJECTION, SOLUTION INTRAMUSCULAR at 13:33

## 2020-08-26 RX ADMIN — LORAZEPAM 1.5 MG: 2 INJECTION INTRAMUSCULAR; INTRAVENOUS at 06:30

## 2020-08-26 RX ADMIN — DIBASIC SODIUM PHOSPHATE, MONOBASIC POTASSIUM PHOSPHATE AND MONOBASIC SODIUM PHOSPHATE 250 MG: 852; 155; 130 TABLET ORAL at 12:36

## 2020-08-26 RX ADMIN — DIBASIC SODIUM PHOSPHATE, MONOBASIC POTASSIUM PHOSPHATE AND MONOBASIC SODIUM PHOSPHATE 250 MG: 852; 155; 130 TABLET ORAL at 05:50

## 2020-08-26 RX ADMIN — DEXMEDETOMIDINE HYDROCHLORIDE 0.2 MCG/KG/HR: 100 INJECTION, SOLUTION INTRAVENOUS at 13:57

## 2020-08-26 ASSESSMENT — LIFESTYLE VARIABLES
HEADACHE, FULLNESS IN HEAD: MODERATE
VISUAL DISTURBANCES: VERY MILD SENSITIVITY
PAROXYSMAL SWEATS: BARELY PERCEPTIBLE SWEATING. PALMS MOIST
NAUSEA AND VOMITING: NO NAUSEA AND NO VOMITING
ANXIETY: *
AUDITORY DISTURBANCES: NOT PRESENT
HEADACHE, FULLNESS IN HEAD: VERY MILD
AUDITORY DISTURBANCES: NOT PRESENT
TOTAL SCORE: VERY MILD ITCHING, PINS AND NEEDLES SENSATION, BURNING OR NUMBNESS
PAROXYSMAL SWEATS: BARELY PERCEPTIBLE SWEATING. PALMS MOIST
PAROXYSMAL SWEATS: BEADS OF SWEAT OBVIOUS ON FOREHEAD
ORIENTATION AND CLOUDING OF SENSORIUM: CANNOT DO SERIAL ADDITIONS OR IS UNCERTAIN ABOUT DATE
ANXIETY: *
TOTAL SCORE: 21
ORIENTATION AND CLOUDING OF SENSORIUM: DATE DISORIENTATION BY MORE THAN TWO CALENDAR DAYS
ORIENTATION AND CLOUDING OF SENSORIUM: DATE DISORIENTATION BY MORE THAN TWO CALENDAR DAYS
AGITATION: *
VISUAL DISTURBANCES: VERY MILD SENSITIVITY
TREMOR: *
TOTAL SCORE: VERY MILD ITCHING, PINS AND NEEDLES SENSATION, BURNING OR NUMBNESS
PAROXYSMAL SWEATS: BARELY PERCEPTIBLE SWEATING. PALMS MOIST
TOTAL SCORE: 28
TOTAL SCORE: 21
HEADACHE, FULLNESS IN HEAD: MODERATE
ANXIETY: MODERATELY ANXIOUS OR GUARDED, SO ANXIETY IS INFERRED
HEADACHE, FULLNESS IN HEAD: VERY MILD
TOTAL SCORE: 12
VISUAL DISTURBANCES: NOT PRESENT
TREMOR: *
HEADACHE, FULLNESS IN HEAD: MODERATE
HEADACHE, FULLNESS IN HEAD: MODERATE
TREMOR: *
HEADACHE, FULLNESS IN HEAD: MODERATE
AUDITORY DISTURBANCES: VERY MILD HARSHNESS OR ABILITY TO FRIGHTEN
VISUAL DISTURBANCES: VERY MILD SENSITIVITY
HEADACHE, FULLNESS IN HEAD: MODERATE
PAROXYSMAL SWEATS: BARELY PERCEPTIBLE SWEATING. PALMS MOIST
AGITATION: *
TREMOR: SEVERE TREMOR, EVEN WITH ARMS NOT EXTENDED
ANXIETY: *
AUDITORY DISTURBANCES: NOT PRESENT
AGITATION: MODERATELY FIDGETY AND RESTLESS
TREMOR: *
AGITATION: *
ORIENTATION AND CLOUDING OF SENSORIUM: DATE DISORIENTATION BY NO MORE THAN TWO CALENDAR DAYS
HEADACHE, FULLNESS IN HEAD: MODERATE
VISUAL DISTURBANCES: VERY MILD SENSITIVITY
HEADACHE, FULLNESS IN HEAD: MODERATE
TOTAL SCORE: 32
AGITATION: *
AGITATION: *
TOTAL SCORE: 23
NAUSEA AND VOMITING: MILD NAUSEA WITH NO VOMITING
TREMOR: MODERATE TREMOR WITH ARMS EXTENDED
ORIENTATION AND CLOUDING OF SENSORIUM: DATE DISORIENTATION BY MORE THAN TWO CALENDAR DAYS
ANXIETY: *
NAUSEA AND VOMITING: MILD NAUSEA WITH NO VOMITING
TOTAL SCORE: 26
NAUSEA AND VOMITING: NO NAUSEA AND NO VOMITING
NAUSEA AND VOMITING: NO NAUSEA AND NO VOMITING
VISUAL DISTURBANCES: VERY MILD SENSITIVITY
ORIENTATION AND CLOUDING OF SENSORIUM: DATE DISORIENTATION BY MORE THAN TWO CALENDAR DAYS
TOTAL SCORE: VERY MILD ITCHING, PINS AND NEEDLES SENSATION, BURNING OR NUMBNESS
VISUAL DISTURBANCES: NOT PRESENT
NAUSEA AND VOMITING: NO NAUSEA AND NO VOMITING
AGITATION: MODERATELY FIDGETY AND RESTLESS
ORIENTATION AND CLOUDING OF SENSORIUM: DATE DISORIENTATION BY MORE THAN TWO CALENDAR DAYS
HEADACHE, FULLNESS IN HEAD: VERY MILD
TREMOR: *
AUDITORY DISTURBANCES: VERY MILD HARSHNESS OR ABILITY TO FRIGHTEN
AUDITORY DISTURBANCES: VERY MILD HARSHNESS OR ABILITY TO FRIGHTEN
ANXIETY: *
AUDITORY DISTURBANCES: NOT PRESENT
TREMOR: *
AGITATION: *
PAROXYSMAL SWEATS: BARELY PERCEPTIBLE SWEATING. PALMS MOIST
AUDITORY DISTURBANCES: VERY MILD HARSHNESS OR ABILITY TO FRIGHTEN
NAUSEA AND VOMITING: NO NAUSEA AND NO VOMITING
NAUSEA AND VOMITING: MILD NAUSEA WITH NO VOMITING
PAROXYSMAL SWEATS: BARELY PERCEPTIBLE SWEATING. PALMS MOIST
PAROXYSMAL SWEATS: BEADS OF SWEAT OBVIOUS ON FOREHEAD
ANXIETY: *
ORIENTATION AND CLOUDING OF SENSORIUM: DATE DISORIENTATION BY MORE THAN TWO CALENDAR DAYS
AUDITORY DISTURBANCES: NOT PRESENT
ANXIETY: MODERATELY ANXIOUS OR GUARDED, SO ANXIETY IS INFERRED
PAROXYSMAL SWEATS: BARELY PERCEPTIBLE SWEATING. PALMS MOIST
HEADACHE, FULLNESS IN HEAD: VERY MILD
PAROXYSMAL SWEATS: BARELY PERCEPTIBLE SWEATING. PALMS MOIST
ORIENTATION AND CLOUDING OF SENSORIUM: DATE DISORIENTATION BY MORE THAN TWO CALENDAR DAYS
TOTAL SCORE: 23
PAROXYSMAL SWEATS: BARELY PERCEPTIBLE SWEATING. PALMS MOIST
AUDITORY DISTURBANCES: MILD HARSHNESS OR ABILITY TO FRIGHTEN
NAUSEA AND VOMITING: MILD NAUSEA WITH NO VOMITING
VISUAL DISTURBANCES: NOT PRESENT
TOTAL SCORE: 17
ANXIETY: *
TOTAL SCORE: 32
AUDITORY DISTURBANCES: VERY MILD HARSHNESS OR ABILITY TO FRIGHTEN
AUDITORY DISTURBANCES: NOT PRESENT
PAROXYSMAL SWEATS: BARELY PERCEPTIBLE SWEATING. PALMS MOIST
ORIENTATION AND CLOUDING OF SENSORIUM: DATE DISORIENTATION BY MORE THAN TWO CALENDAR DAYS
SUBSTANCE_ABUSE: 1
AGITATION: *
AUDITORY DISTURBANCES: VERY MILD HARSHNESS OR ABILITY TO FRIGHTEN
ORIENTATION AND CLOUDING OF SENSORIUM: CANNOT DO SERIAL ADDITIONS OR IS UNCERTAIN ABOUT DATE
ORIENTATION AND CLOUDING OF SENSORIUM: DATE DISORIENTATION BY MORE THAN TWO CALENDAR DAYS
TREMOR: SEVERE TREMOR, EVEN WITH ARMS NOT EXTENDED
VISUAL DISTURBANCES: NOT PRESENT
TREMOR: *
ANXIETY: *
NAUSEA AND VOMITING: MILD NAUSEA WITH NO VOMITING
ORIENTATION AND CLOUDING OF SENSORIUM: DATE DISORIENTATION BY NO MORE THAN TWO CALENDAR DAYS
TREMOR: *
ANXIETY: *
NAUSEA AND VOMITING: NO NAUSEA AND NO VOMITING
VISUAL DISTURBANCES: MILD SENSITIVITY
HEADACHE, FULLNESS IN HEAD: VERY MILD
VISUAL DISTURBANCES: VERY MILD SENSITIVITY
TREMOR: SEVERE TREMOR, EVEN WITH ARMS NOT EXTENDED
VISUAL DISTURBANCES: VERY MILD SENSITIVITY
ANXIETY: *
TREMOR: *
TOTAL SCORE: 30
PAROXYSMAL SWEATS: BARELY PERCEPTIBLE SWEATING. PALMS MOIST
PAROXYSMAL SWEATS: BEADS OF SWEAT OBVIOUS ON FOREHEAD
AGITATION: *
AGITATION: *
TOTAL SCORE: 13
ANXIETY: MODERATELY ANXIOUS OR GUARDED, SO ANXIETY IS INFERRED
NAUSEA AND VOMITING: MILD NAUSEA WITH NO VOMITING
TOTAL SCORE: 28
HEADACHE, FULLNESS IN HEAD: MODERATE
ORIENTATION AND CLOUDING OF SENSORIUM: DATE DISORIENTATION BY MORE THAN TWO CALENDAR DAYS
TREMOR: SEVERE TREMOR, EVEN WITH ARMS NOT EXTENDED
NAUSEA AND VOMITING: NO NAUSEA AND NO VOMITING
TOTAL SCORE: 20
AGITATION: *
AGITATION: *
VISUAL DISTURBANCES: NOT PRESENT
TOTAL SCORE: VERY MILD ITCHING, PINS AND NEEDLES SENSATION, BURNING OR NUMBNESS
ANXIETY: *
TOTAL SCORE: VERY MILD ITCHING, PINS AND NEEDLES SENSATION, BURNING OR NUMBNESS
AGITATION: *
VISUAL DISTURBANCES: NOT PRESENT
AUDITORY DISTURBANCES: VERY MILD HARSHNESS OR ABILITY TO FRIGHTEN
NAUSEA AND VOMITING: NO NAUSEA AND NO VOMITING

## 2020-08-26 ASSESSMENT — ENCOUNTER SYMPTOMS
EYES NEGATIVE: 1
NERVOUS/ANXIOUS: 1
HEADACHES: 0
CHILLS: 0
ABDOMINAL PAIN: 0
PALPITATIONS: 0
CONSTITUTIONAL NEGATIVE: 1
SORE THROAT: 0
WEIGHT LOSS: 0
COUGH: 0
SHORTNESS OF BREATH: 0
RESPIRATORY NEGATIVE: 1
FEVER: 0
BRUISES/BLEEDS EASILY: 0
DIZZINESS: 0
DEPRESSION: 0
FOCAL WEAKNESS: 0
BLURRED VISION: 0
NAUSEA: 0
CARDIOVASCULAR NEGATIVE: 1
DIAPHORESIS: 0
MYALGIAS: 0
GASTROINTESTINAL NEGATIVE: 1
WEAKNESS: 0
VOMITING: 0
NEUROLOGICAL NEGATIVE: 1

## 2020-08-26 ASSESSMENT — COGNITIVE AND FUNCTIONAL STATUS - GENERAL
DRESSING REGULAR LOWER BODY CLOTHING: A LITTLE
SUGGESTED CMS G CODE MODIFIER MOBILITY: CK
PERSONAL GROOMING: A LITTLE
TOILETING: A LITTLE
DRESSING REGULAR UPPER BODY CLOTHING: A LITTLE
MOVING TO AND FROM BED TO CHAIR: A LITTLE
WALKING IN HOSPITAL ROOM: A LITTLE
SUGGESTED CMS G CODE MODIFIER DAILY ACTIVITY: CK
HELP NEEDED FOR BATHING: A LITTLE
DAILY ACTIVITIY SCORE: 18
CLIMB 3 TO 5 STEPS WITH RAILING: A LITTLE
MOBILITY SCORE: 18
STANDING UP FROM CHAIR USING ARMS: A LITTLE
EATING MEALS: A LITTLE
MOVING FROM LYING ON BACK TO SITTING ON SIDE OF FLAT BED: A LITTLE
TURNING FROM BACK TO SIDE WHILE IN FLAT BAD: A LITTLE

## 2020-08-26 NOTE — PROCEDURES
Vascular Access Team    Date of Insertion: 8/26/2020  Arm Circumference: n/a  Line Length: 8cm  Line Size: 18g  Vein Occupancy %: 42  Reason for Midline: access  Labs: WBC 3.6, , BUN 11, Cr 0.71, GFR >60, INR n/a    Orders confirmed, vessel patency confirmed with ultrasound. Risks and benefits of procedure explained to patient and education regarding line associated bloodstream infections provided. Questions answered.     PowerGlide Midline placed in RUE per licensed provider order with ultrasound guidance. 18g, 8 cm line placed in cephalic vein after 1 attempt(s).  Catheter inserted with brisk blood return. Secured with 0cm external from insertion site.  Line flushed without resistance with 10 mL 0.9% normal saline.  Midline secured with Biopatch and Tegaderm.     Midline placement is confirmed by nurse using ultrasound and ability to flush and draw blood. Midline is appropriate for use at this time.  No X-ray is needed for placement confirmation. Pt tolerated procedure well.  Patient condition relayed to unit RN or ordering physician via this post procedure note in the EMR.    Ultrasound images uploaded to PACS and viewable in the EMR - yes  Ultrasound imaged printed and placed in paper chart - no      BARD PowerGlide Midline ref # [id number], Lot # [id number], Expiration Date 6/30/2021

## 2020-08-26 NOTE — PROGRESS NOTES
"Pt got more agitated, got up, put her clothes on, holding her purse and wants to leave. She was screaming, kicking and punching staff despite education. She remains unsteady, shaky, tremors. She was angry that \"you guys were going to take my purse away\". Pt was placed back in bed by 4 staff members. Security notified. [employer]N notified. Restraints applied.    "

## 2020-08-26 NOTE — PROGRESS NOTES
Pt awake x3-4, repetitive and forgets situation. Impulsive and gets irritated easily. occasionally talks to herself.  Ate dinner tonight. Up to bedside commode with one person contact guard assist. Had loose bm. Voiding with urgency and frequency. CHG bath done. Very shaky and unsteady. States hx of fall. Precautions in place. Bed alarm on. Initial CIWA score my this shift 14.

## 2020-08-26 NOTE — CARE PLAN
Problem: Safety  Goal: Will remain free from injury  Outcome: PROGRESSING AS EXPECTED  Intervention: Provide assistance with mobility  Flowsheets (Taken 8/25/2020 1818)  Assistance: Assistance of One  Ambulation Tolerance: General Weakness  Note: Pt very unsteady on her feet. Fall precautions in place. Impulsive and noncompliant at times. Bed alarm active.      Problem: Skin Integrity  Goal: Risk for impaired skin integrity will decrease  Outcome: PROGRESSING AS EXPECTED     Problem: Urinary Elimination:  Goal: Ability to reestablish a normal urinary elimination pattern will improve  Outcome: PROGRESSING AS EXPECTED  Intervention: Assist patient to sit on commode or toilet for voiding  Note: With frequency. Assisted to commode x1 person

## 2020-08-26 NOTE — PROGRESS NOTES
[facility] Medicine Daily Progress Note    Date of Service  8/26/2020    Chief Complaint  59 y.o. female admitted 8/24/2020 with weakness and knee pain after a ground level fall due to alcohol intoxication    Hospital Course    Patient is a 59 y.o. female with alcohol abuse, history of CHF and hypertension, who presented 8/24/2020 with ground-level fall, resultant knee pain.  She states she has not been eating much for 1 week, due to significant nausea and vomiting.  Initial work-up showed significant hypokalemia and hypomagnesemia.  Her potassium was 2.2, with sodium of 129, and magnesium of 0.9.  She is given IV potassium and magnesium replacement.  She did have elevated liver enzymes, and right upper quadrant ultrasound showed increased hepatic echogenicity suggestive of steatosis. She was admitted to the ICU for close monitoring and correction of severe electrolyte abnormalities.       Interval Problem Update  Ongoing alcohol withdrawl with secondary tachycardia  CIWA over 20  Patient combative  She is disoriented but alert and no focal deficits  Denies pain, no sob  Discussed with nursing and Dr. Arroyo - will transfer back to ICU for precedex    Consultants/Specialty  Critical care    Code Status  Full Code    Disposition  Transfer back to icu    Review of Systems  Review of Systems   Constitutional: Negative.  Negative for chills, diaphoresis, fever, malaise/fatigue and weight loss.   HENT: Negative.  Negative for sore throat.    Eyes: Negative.  Negative for blurred vision.   Respiratory: Negative.  Negative for cough and shortness of breath.    Cardiovascular: Negative.  Negative for chest pain, palpitations and leg swelling.   Gastrointestinal: Negative.  Negative for abdominal pain, nausea and vomiting.   Genitourinary: Negative.  Negative for dysuria.   Musculoskeletal: Positive for joint pain. Negative for myalgias.   Skin: Negative.  Negative for itching and rash.   Neurological: Negative.  Negative for  dizziness, focal weakness, weakness and headaches.   Endo/Heme/Allergies: Negative.  Does not bruise/bleed easily.   Psychiatric/Behavioral: Positive for substance abuse. Negative for depression and suicidal ideas. The patient is nervous/anxious.    All other systems reviewed and are negative.       Physical Exam  Temp:  [36.3 °C (97.4 °F)-36.8 °C (98.2 °F)] 36.3 °C (97.4 °F)  Pulse:  [101-160] 127  Resp:  [18-27] 20  BP: ()/() 118/77  SpO2:  [92 %-99 %] 95 %    Physical Exam  Vitals signs and nursing note reviewed. Exam conducted with a chaperone present.   Constitutional:       General: She is not in acute distress.     Appearance: Normal appearance. She is not diaphoretic.   HENT:      Head: Normocephalic.      Nose: Nose normal.      Mouth/Throat:      Mouth: Mucous membranes are moist.   Eyes:      Pupils: Pupils are equal, round, and reactive to light.   Cardiovascular:      Rate and Rhythm: Regular rhythm. Tachycardia present.      Pulses: Normal pulses.      Heart sounds: Normal heart sounds.   Pulmonary:      Effort: Pulmonary effort is normal.      Breath sounds: Normal breath sounds.   Abdominal:      General: Abdomen is flat. Bowel sounds are normal.      Palpations: Abdomen is soft.   Musculoskeletal: Normal range of motion.         General: No swelling or deformity.   Skin:     General: Skin is warm and dry.      Capillary Refill: Capillary refill takes less than 2 seconds.   Neurological:      General: No focal deficit present.      Mental Status: She is alert and oriented to person, place, and time.      Cranial Nerves: No cranial nerve deficit.   Psychiatric:         Mood and Affect: Mood normal.         Behavior: Behavior normal.         Fluids    Intake/Output Summary (Last 24 hours) at 8/26/2020 1259  Last data filed at 8/25/2020 2000  Gross per 24 hour   Intake 429.08 ml   Output --   Net 429.08 ml       Laboratory  Recent Labs     08/24/20  0807 08/25/20  0310 08/26/20  0330   WBC  4.4* 4.2* 3.6*   RBC 3.70* 2.94* 3.05*   HEMOGLOBIN 12.6 10.1* 10.4*   HEMATOCRIT 34.6* 28.8* 31.3*   MCV 93.5 98.0* 102.6*   MCH 34.1* 34.4* 34.1*   MCHC 36.4* 35.1* 33.2*   RDW 62.8* 69.0* 78.3*   PLATELETCT 147* 130* 156*   MPV 10.3 10.5 10.7     Recent Labs     08/24/20 2004 08/24/20 2328 08/25/20 0310 08/26/20  0330   SODIUM 131*  --  135 136   POTASSIUM 2.5* 2.9* 3.2* 3.6   CHLORIDE 88*  --  92* 96   CO2 31  --  30 27   GLUCOSE 130*  --  75 95   BUN 10  --  11 11   CREATININE 0.95  --  0.72 0.71   CALCIUM 8.8  --  8.6 8.6                   Imaging  US-RUQ   Final Result      1.  No acute sonographic abnormality. No gallstones.   2.  Increased hepatic echogenicity, suggestive of steatosis and/or hepatocellular disease.      DX-CHEST-2 VIEWS   Final Result      No acute cardiopulmonary abnormality.      CT-HEAD W/O   Final Result      1. No CT evidence of acute infarct, hemorrhage or mass.   2. Mild to moderate global parenchymal atrophy. Chronic small vessel ischemic changes.           Assessment/Plan  * Hypokalemia- (present on admission)  Assessment & Plan  Resolved  following    Alcohol withdrawal, uncomplicated (HCC)- (present on admission)  Assessment & Plan  Ongoing  Continue CIWA  Continue IV thiamine, folate, and multivitamin.   Following closely  Transfer back to ICU for precedex      Hypomagnesemia- (present on admission)  Assessment & Plan  Continue replacement  following    Hypophosphatemia- (present on admission)  Assessment & Plan  Replacing  following    Elevated liver enzymes- (present on admission)  Assessment & Plan  etoh abuse likely contributing  Hep C ab positive, will check hiv      Diarrhea- (present on admission)  Assessment & Plan  C dif pending  following    Pancytopenia (HCC)- (present on admission)  Assessment & Plan  Chronic  Likely due to etoh toxicity  following    Tobacco use- (present on admission)  Assessment & Plan  Cessation along with etoh cessation discussed and  recommended       VTE prophylaxis: heparin

## 2020-08-26 NOTE — PROGRESS NOTES
"Assisted patient to the bathroom. I was fixing the bed when I smelled smoke. I saw patient flushed something on the toilet. I helped her back in bed then confronted her. She got agitated and told me to \"leave me alone and get the hell out\". I told patient that if she does not give up her cigarette and match that I will have to call security to go through her belongings. She gave me a match and cigarette box with one small cigarette butt inside. She told me she flushed a cigarette butt in the toilet. Reeducated again that she has nicotine patch now and that she gets lorazepam and librium to help with withdrawal. She was angry but agrees. [facility] protocol remains active. Bed alarm on.   "

## 2020-08-26 NOTE — PROGRESS NOTES
"Critical Care Progress Note    Date of admission  8/24/2020    Chief Complaint  59 y.o. female admitted 8/24/2020 with upper natremia and hypomagnesemia, falls    Hospital Course    \"59 y.o. female who presented 8/24/2020 with a ground-level fall and resultant knee pain.  She states that she has not been able to eat much this week and is at significant nausea and vomiting.  She started feeling weakness in her legs as well as a tingling sensation but denies focal deficits.  Nothing makes it better or worse. Denies fevers, chills, headache, loss of consciousness, chest pain, shortness of breath.     In the emergency department she was found to be hypokalemic and hypomagnesemic.  ICU consulted given the profound nature of these electrolyte disturbances and symptoms.\"    8/25 - electrolytes corrected, mild withdrawal, stable for transfer      Interval Problem Update  Reviewed last 24 hour events:   - patient transferred back to ICU for worsening agitated delirium secondary to alcohol withdrawal   - Neuro: Agitated, yelling and combative   - HR: 70s-140s   - SBP: 90s-150s   - GI: tolerating diet when able to   - UOP: adequate, not measured   - Velásquez: no   - Tm: 36.8   - Lines: PIV - attempting to obtain   - PPx: GI not indicated, DVT heparin   - RA   - CXR (personally reviewed and compared to prior): no new    Yesterday   - Potassium improved to 3.2 and magnesium 2.6   - Neuro: Alert and oriented x4   - HR: 80s to 110s   - SBP: 110s to 130s   - GI: Tolerating diet   - UOP: Poorly measured   - Velásquez: No   - Tm: 36.7 °C   - Lines: Peripheral IV   - PPx: GI not indicated, DVT heparin   - 3L NC   - CXR (personally reviewed and compared to prior): No new    Review of Systems  Review of Systems   Unable to perform ROS: Mental acuity        Vital Signs for last 24 hours   Temp:  [36.3 °C (97.4 °F)-36.8 °C (98.2 °F)] 36.3 °C (97.4 °F)  Pulse:  [101-160] 119  Resp:  [20-27] 24  BP: ()/() 108/83  SpO2:  [92 %-99 %] 97 " %    Hemodynamic parameters for last 24 hours       Respiratory Information for the last 24 hours       Physical Exam   Physical Exam  Vitals signs and nursing note reviewed.   Constitutional:       General: She is in acute distress.      Appearance: She is ill-appearing. She is not toxic-appearing.   HENT:      Head: Normocephalic and atraumatic.      Right Ear: External ear normal.      Left Ear: External ear normal.      Nose: Nose normal.      Mouth/Throat:      Mouth: Mucous membranes are dry.   Eyes:      Extraocular Movements: Extraocular movements intact.      Pupils: Pupils are equal, round, and reactive to light.   Cardiovascular:      Rate and Rhythm: Normal rate and regular rhythm.      Pulses: Normal pulses.      Heart sounds: Normal heart sounds.   Pulmonary:      Effort: Pulmonary effort is normal.      Breath sounds: Normal breath sounds.   Abdominal:      General: Abdomen is flat.      Tenderness: There is no guarding or rebound.   Musculoskeletal: Normal range of motion.         General: No swelling, tenderness, deformity or signs of injury.      Right lower leg: No edema.      Left lower leg: No edema.   Skin:     General: Skin is warm and dry.      Capillary Refill: Capillary refill takes less than 2 seconds.   Neurological:      General: No focal deficit present.      Mental Status: She is alert. She is disoriented and confused.      GCS: GCS eye subscore is 4. GCS verbal subscore is 4. GCS motor subscore is 6.      Cranial Nerves: No cranial nerve deficit.      Sensory: No sensory deficit.      Motor: Tremor (severe) present. No weakness.      Coordination: Coordination abnormal.   Psychiatric:         Mood and Affect: Affect is angry.         Speech: Speech is rapid and pressured.         Behavior: Behavior is agitated and aggressive.         Cognition and Memory: Cognition is impaired.         Judgment: Judgment is impulsive and inappropriate.      Comments: ?Hallucinations          Medications  Current Facility-Administered Medications   Medication Dose Route Frequency Provider Last Rate Last Dose   • Pharmacy Consult Request  1 Each Other PHARMACY TO DOSE Mariana Del Rio M.D.       • dexmedetomidine (PRECEDEX) 400 mcg/100mL NS premix infusion  0.1-1 mcg/kg/hr Intravenous Continuous Ervin Arroyo Jr., D.O. 3.5 mL/hr at 08/26/20 1357 0.2 mcg/kg/hr at 08/26/20 1357   • LORazepam (ATIVAN) injection 2 mg  2 mg Intravenous Q4HRS Ervin Arroyo Jr., D.O.   2 mg at 08/26/20 1400   • LORazepam (ATIVAN) injection 1-2 mg  1-2 mg Intravenous Q2HRS PRN Ervin Arroyo Jr., D.O.       • haloperidol lactate (HALDOL) injection 2.5 mg  2.5 mg Intravenous Q10 MIN PRN Ervin Arroyo Jr., D.O.   2.5 mg at 08/26/20 1333   • omeprazole (PRILOSEC) capsule 20 mg  20 mg Oral BID Ervin Arroyo Jr., D.O.   20 mg at 08/26/20 0550   • folic acid (FOLVITE) tablet 1 mg  1 mg Oral DAILY Curtis Maddox M.D.   1 mg at 08/26/20 0549   • therapeutic multivitamin-minerals (THERAGRAN-M) tablet 1 Tab  1 Tab Oral DAILY Curtis Maddox M.D.   1 Tab at 08/26/20 0550   • acetaminophen (TYLENOL) tablet 650 mg  650 mg Oral Q4HRS PRN Curtis Maddox M.D.   650 mg at 08/25/20 1924   • nicotine (NICODERM) 21 MG/24HR 21 mg  21 mg Transdermal Daily-0600 Corina Pan, A.P.R.N.   21 mg at 08/25/20 2356    And   • nicotine polacrilex (NICORETTE) 2 MG piece 2 mg  2 mg Oral Q HOUR PRN Corina Pan, A.P.R.N.       • heparin injection 5,000 Units  5,000 Units Subcutaneous Q8HRS Jim Sandoval M.D.   5,000 Units at 08/26/20 1236   • promethazine (PHENERGAN) tablet 12.5-25 mg  12.5-25 mg Oral Q4HRS PRN Jim Sandoval M.D.       • promethazine (PHENERGAN) suppository 12.5-25 mg  12.5-25 mg Rectal Q4HRS PRN Jim Sandoval M.D.       • prochlorperazine (COMPAZINE) injection 5-10 mg  5-10 mg Intravenous Q4HRS PRN Jim Sandoval M.D.   10 mg at 08/24/20 2134   • potassium chloride SA (Kdur) tablet 20 mEq  20 mEq Oral BID  Ervin Arroyo Jr., D.O.   20 mEq at 08/26/20 0550   • metoclopramide (REGLAN) tablet 10 mg  10 mg Oral ACHS PRN Ervin Arroyo Jr., D.O.   10 mg at 08/25/20 1924       Fluids    Intake/Output Summary (Last 24 hours) at 8/26/2020 1412  Last data filed at 8/25/2020 2000  Gross per 24 hour   Intake 262.48 ml   Output --   Net 262.48 ml       Laboratory          Recent Labs     08/24/20 0807 08/24/20 2004 08/24/20 2328 08/25/20 0310 08/26/20  0330   SODIUM 129* 131*  --  135 136   POTASSIUM 2.2* 2.5* 2.9* 3.2* 3.6   CHLORIDE 77* 88*  --  92* 96   CO2 28 31  --  30 27   BUN 8 10  --  11 11   CREATININE 0.97 0.95  --  0.72 0.71   MAGNESIUM 0.9* 2.6*  --   --  1.2*   PHOSPHORUS  --   --   --  1.5* 1.6*   CALCIUM 10.1 8.8  --  8.6 8.6     Recent Labs     08/24/20 0807 08/24/20 2004 08/25/20 0310 08/26/20  0330   ALTSGPT 124*  --  88* 103*   ASTSGOT 336*  --  237* 289*   ALKPHOSPHAT 220*  --  157* 191*   TBILIRUBIN 2.9*  --  1.9* 1.3   DBILIRUBIN  --   --   --  0.7*   LIPASE 97*  --   --   --    GLUCOSE 64* 130* 75 95     Recent Labs     08/24/20 0807 08/25/20 0310 08/26/20  0330   WBC 4.4* 4.2* 3.6*   NEUTSPOLYS 62.50  --  66.70   LYMPHOCYTES 19.90*  --  22.80   MONOCYTES 15.30*  --  3.50   EOSINOPHILS 0.90  --  1.80   BASOPHILS 0.90  --  5.30*   ASTSGOT 336* 237* 289*   ALTSGPT 124* 88* 103*   ALKPHOSPHAT 220* 157* 191*   TBILIRUBIN 2.9* 1.9* 1.3     Recent Labs     08/24/20 0807 08/25/20  0310 08/25/20  1249 08/26/20  0330   RBC 3.70* 2.94*  --  3.05*   HEMOGLOBIN 12.6 10.1*  --  10.4*   HEMATOCRIT 34.6* 28.8*  --  31.3*   PLATELETCT 147* 130*  --  156*   FERRITIN  --   --  339.0*  --        Imaging  X-Ray:  I have personally reviewed the images and compared with prior images.  CT:    Reviewed    Assessment/Plan  * Hypokalemia- (present on admission)  Assessment & Plan  No ECG changes but symptomatic, initial level 2.2  3.6 this AM  Consistent with history of poor po intake    Alcohol withdrawal delirium,  acute, hyperactive (HCC)- (present on admission)  Assessment & Plan  Active drinking with no plans for cessation, encouraged cessation   Thiamine IV, vitamins  Precedex, ativan protocol    Hypomagnesemia- (present on admission)  Assessment & Plan  Secondary to alcoholism and poor po intake  Replete to patient to maintain >2    Hypophosphatemia- (present on admission)  Assessment & Plan  Replete to >3    Elevated liver enzymes- (present on admission)  Assessment & Plan  Consistent with history of alcohol abuse, routine monitoring for now  RUQ US: Increased hepatic echogenicity, suggestive of steatosis and/or hepatocellular disease.  Trend, mildly worse today  Avoid hepatotoxins  Monitor synthetic functions    Diarrhea- (present on admission)  Assessment & Plan  C diff negative  F/U studies    Pancytopenia (HCC)- (present on admission)  Assessment & Plan  Likely related to cirrhosis    Tobacco use- (present on admission)  Assessment & Plan  Nicotine replacement protocol       VTE:  Heparin  Ulcer: Not Indicated  Lines: None    I have performed a physical exam and reviewed and updated ROS and Plan today (8/26/2020). In review of yesterday's note (8/25/2020), there are no changes except as documented above.     Titrating sedation meds. This patient is critically ill, at high risk for decompensation leading to worsening vital organ dysfunction and death without critical care interventions.    Discussed patient condition and risk of morbidity and/or mortality with Hospitalist, RN, RT, Pharmacy, Dietary, , Charge nurse / hot rounds and Patient       The patient remains critically ill.  Critical care time = 36 minutes in directly providing and coordinating critical care and extensive data review.  No time overlap and excludes procedures

## 2020-08-26 NOTE — PROGRESS NOTES
Called  for add on orders on stool that was sent yesterday. States micro will be here 0630 am. Task completed in computer.

## 2020-08-26 NOTE — PROGRESS NOTES
I was called in the room that patient have a knife in her possession. Nurse in charge and  at bedside. Pt consented security to search through her bag. The knife was retrieved by the guard only to be returned upon discharge. Lighter, nail clipper and purse is in the patient drawer. Sitter in the room.

## 2020-08-26 NOTE — PROGRESS NOTES
At 0500, pt removed a knife from her purse to attempt to cut herself free from restraints. CCT was able to ask the patient to close the knife and remove it from patient's possession. Security called to bedside, pt consented to having her purse searched. Knife sent with security, can be retrieved upon discharge. Three lighters, nail clippers, and tweezers taken from purse by security and placed in patient drawer by security. Pt's purse returned with other belongings, explained that all belongings will be returned when discharged.

## 2020-08-26 NOTE — PROGRESS NOTES
"Pt getting more impulsive, agitated and angry. Wants to go downstairs \"to check my [employer] account. I just got paid.\" Educated that she is not allowed to go downstairs as she is tele monitored and unstable. Pt yelled and states that I'm holding her hostage. I talked to the charge RN about patient. Safety sitter in room.   "

## 2020-08-27 LAB
ALBUMIN SERPL BCP-MCNC: 3.2 G/DL (ref 3.2–4.9)
ALBUMIN/GLOB SERPL: 1.1 G/DL
ALP SERPL-CCNC: 172 U/L (ref 30–99)
ALT SERPL-CCNC: 93 U/L (ref 2–50)
ANION GAP SERPL CALC-SCNC: 15 MMOL/L (ref 7–16)
AST SERPL-CCNC: 185 U/L (ref 12–45)
BASOPHILS # BLD AUTO: 1.9 % (ref 0–1.8)
BASOPHILS # BLD: 0.09 K/UL (ref 0–0.12)
BILIRUB SERPL-MCNC: 1.2 MG/DL (ref 0.1–1.5)
BUN SERPL-MCNC: 7 MG/DL (ref 8–22)
CALCIUM SERPL-MCNC: 9.2 MG/DL (ref 8.5–10.5)
CHLORIDE SERPL-SCNC: 100 MMOL/L (ref 96–112)
CO2 SERPL-SCNC: 25 MMOL/L (ref 20–33)
CREAT SERPL-MCNC: 0.47 MG/DL (ref 0.5–1.4)
E COLI SXT1+2 STL IA: NORMAL
EOSINOPHIL # BLD AUTO: 0.12 K/UL (ref 0–0.51)
EOSINOPHIL NFR BLD: 2.5 % (ref 0–6.9)
ERYTHROCYTE [DISTWIDTH] IN BLOOD BY AUTOMATED COUNT: 81.3 FL (ref 35.9–50)
GLOBULIN SER CALC-MCNC: 3 G/DL (ref 1.9–3.5)
GLUCOSE SERPL-MCNC: 95 MG/DL (ref 65–99)
HCT VFR BLD AUTO: 35.6 % (ref 37–47)
HGB BLD-MCNC: 11.9 G/DL (ref 12–16)
HIV 1+2 AB+HIV1 P24 AG SERPL QL IA: NORMAL
IMM GRANULOCYTES # BLD AUTO: 0.03 K/UL (ref 0–0.11)
IMM GRANULOCYTES NFR BLD AUTO: 0.6 % (ref 0–0.9)
LACTOFERRIN STL QL IA: NEGATIVE
LYMPHOCYTES # BLD AUTO: 0.94 K/UL (ref 1–4.8)
LYMPHOCYTES NFR BLD: 19.5 % (ref 22–41)
MAGNESIUM SERPL-MCNC: 1.8 MG/DL (ref 1.5–2.5)
MCH RBC QN AUTO: 34.8 PG (ref 27–33)
MCHC RBC AUTO-ENTMCNC: 33.4 G/DL (ref 33.6–35)
MCV RBC AUTO: 104.1 FL (ref 81.4–97.8)
MONOCYTES # BLD AUTO: 0.52 K/UL (ref 0–0.85)
MONOCYTES NFR BLD AUTO: 10.8 % (ref 0–13.4)
NEUTROPHILS # BLD AUTO: 3.11 K/UL (ref 2–7.15)
NEUTROPHILS NFR BLD: 64.7 % (ref 44–72)
NRBC # BLD AUTO: 0 K/UL
NRBC BLD-RTO: 0 /100 WBC
PLATELET # BLD AUTO: 181 K/UL (ref 164–446)
PMV BLD AUTO: 11 FL (ref 9–12.9)
POTASSIUM SERPL-SCNC: 3.7 MMOL/L (ref 3.6–5.5)
PROT SERPL-MCNC: 6.2 G/DL (ref 6–8.2)
RBC # BLD AUTO: 3.42 M/UL (ref 4.2–5.4)
SIGNIFICANT IND 70042: NORMAL
SITE SITE: NORMAL
SODIUM SERPL-SCNC: 140 MMOL/L (ref 135–145)
SOURCE SOURCE: NORMAL
WBC # BLD AUTO: 4.8 K/UL (ref 4.8–10.8)

## 2020-08-27 PROCEDURE — 87389 HIV-1 AG W/HIV-1&-2 AB AG IA: CPT

## 2020-08-27 PROCEDURE — 700102 HCHG RX REV CODE 250 W/ 637 OVERRIDE(OP): Performed by: INTERNAL MEDICINE

## 2020-08-27 PROCEDURE — 80053 COMPREHEN METABOLIC PANEL: CPT

## 2020-08-27 PROCEDURE — 83735 ASSAY OF MAGNESIUM: CPT

## 2020-08-27 PROCEDURE — A9270 NON-COVERED ITEM OR SERVICE: HCPCS | Performed by: NURSE PRACTITIONER

## 2020-08-27 PROCEDURE — A9270 NON-COVERED ITEM OR SERVICE: HCPCS | Performed by: INTERNAL MEDICINE

## 2020-08-27 PROCEDURE — 700102 HCHG RX REV CODE 250 W/ 637 OVERRIDE(OP): Performed by: NURSE PRACTITIONER

## 2020-08-27 PROCEDURE — 700111 HCHG RX REV CODE 636 W/ 250 OVERRIDE (IP): Performed by: INTERNAL MEDICINE

## 2020-08-27 PROCEDURE — 99291 CRITICAL CARE FIRST HOUR: CPT | Performed by: INTERNAL MEDICINE

## 2020-08-27 PROCEDURE — 85025 COMPLETE CBC W/AUTO DIFF WBC: CPT

## 2020-08-27 PROCEDURE — 700101 HCHG RX REV CODE 250: Performed by: INTERNAL MEDICINE

## 2020-08-27 PROCEDURE — 770022 HCHG ROOM/CARE - ICU (200)

## 2020-08-27 PROCEDURE — 700105 HCHG RX REV CODE 258: Performed by: INTERNAL MEDICINE

## 2020-08-27 RX ORDER — MAGNESIUM SULFATE HEPTAHYDRATE 40 MG/ML
2 INJECTION, SOLUTION INTRAVENOUS ONCE
Status: COMPLETED | OUTPATIENT
Start: 2020-08-27 | End: 2020-08-27

## 2020-08-27 RX ADMIN — LORAZEPAM 2 MG: 2 INJECTION INTRAMUSCULAR; INTRAVENOUS at 08:07

## 2020-08-27 RX ADMIN — DEXMEDETOMIDINE HYDROCHLORIDE 0.4 MCG/KG/HR: 100 INJECTION, SOLUTION INTRAVENOUS at 02:46

## 2020-08-27 RX ADMIN — LORAZEPAM 2 MG: 2 INJECTION INTRAMUSCULAR; INTRAVENOUS at 21:38

## 2020-08-27 RX ADMIN — OMEPRAZOLE 20 MG: 20 CAPSULE, DELAYED RELEASE ORAL at 18:19

## 2020-08-27 RX ADMIN — HEPARIN SODIUM 5000 UNITS: 5000 INJECTION, SOLUTION INTRAVENOUS; SUBCUTANEOUS at 12:35

## 2020-08-27 RX ADMIN — MAGNESIUM SULFATE IN WATER 2 G: 40 INJECTION, SOLUTION INTRAVENOUS at 12:35

## 2020-08-27 RX ADMIN — MULTIPLE VITAMINS W/ MINERALS TAB 1 TABLET: TAB at 08:07

## 2020-08-27 RX ADMIN — LORAZEPAM 2 MG: 2 INJECTION INTRAMUSCULAR; INTRAVENOUS at 05:28

## 2020-08-27 RX ADMIN — POTASSIUM CHLORIDE 20 MEQ: 1500 TABLET, EXTENDED RELEASE ORAL at 18:19

## 2020-08-27 RX ADMIN — DEXMEDETOMIDINE HYDROCHLORIDE 0.3 MCG/KG/HR: 100 INJECTION, SOLUTION INTRAVENOUS at 21:43

## 2020-08-27 RX ADMIN — LORAZEPAM 2 MG: 2 INJECTION INTRAMUSCULAR; INTRAVENOUS at 12:35

## 2020-08-27 RX ADMIN — NICOTINE TRANSDERMAL SYSTEM 21 MG: 21 PATCH, EXTENDED RELEASE TRANSDERMAL at 05:29

## 2020-08-27 RX ADMIN — LORAZEPAM 2 MG: 2 INJECTION INTRAMUSCULAR; INTRAVENOUS at 18:19

## 2020-08-27 RX ADMIN — HEPARIN SODIUM 5000 UNITS: 5000 INJECTION, SOLUTION INTRAVENOUS; SUBCUTANEOUS at 21:38

## 2020-08-27 RX ADMIN — HEPARIN SODIUM 5000 UNITS: 5000 INJECTION, SOLUTION INTRAVENOUS; SUBCUTANEOUS at 05:28

## 2020-08-27 ASSESSMENT — FIBROSIS 4 INDEX: FIB4 SCORE: 6.04

## 2020-08-27 NOTE — CARE PLAN
Problem: Safety  Goal: Will remain free from falls  Outcome: PROGRESSING AS EXPECTED  Intervention: Implement fall precautions  Flowsheets  Taken 8/26/2020 1812  Bed Alarm: Yes - Alarm On  Chair/Bed Strip Alarm: Yes - Alarm On  Taken 8/26/2020 1600  Environmental Precautions:   Treaded Slipper Socks on Patient   Personal Belongings, Wastebasket, Call Bell etc. in Easy Reach   Report Given to Other Health Care Providers Regarding Fall Risk   Bed in Low Position   Communication Sign for Patients & Families   Mobility Assessed & Appropriate Sign Placed  Note: All of the above and sitter remains at bedside.      Problem: Knowledge Deficit  Goal: Knowledge of the prescribed therapeutic regimen will improve  Outcome: PROGRESSING SLOWER THAN EXPECTED  Intervention: Discuss information regarding therpeutic regimen and document in education  Note: Pt refuses education r/t reason for admittance.

## 2020-08-27 NOTE — DISCHARGE PLANNING
Care Transition Team Discharge Planning    Anticipated Discharge Disposition: TBD    Action: Per AM rounds, pt admit dx ETOH. Pt moved back to higher level of care (ICU) yesterday. Pt currently demanding to brush her own hair. Pt is agitated. Pt may sit up in chair today. Pt has been refusing ordered supplements.     Barriers to Discharge: TBD    Plan: f/u w/ medical team

## 2020-08-27 NOTE — CARE PLAN
Problem: Safety  Goal: Will remain free from falls  Outcome: PROGRESSING AS EXPECTED  Intervention: Implement fall precautions  Flowsheets  Taken 8/27/2020 0430  Bed Alarm: Yes - Alarm On  Bedrails: Alternative to Bedrails Used  Taken 8/26/2020 2000  Environmental Precautions:   Treaded Slipper Socks on Patient   Personal Belongings, Wastebasket, Call Bell etc. in Easy Reach   Transferred to Stronger Side   Report Given to Other Health Care Providers Regarding Fall Risk   Bed in Low Position   Communication Sign for Patients & Families   Mobility Assessed & Appropriate Sign Placed     Problem: Safety - Medical Restraint  Goal: Remains free of injury from restraints (Restraint for Interference with Medical Device)  Description: INTERVENTIONS:  1. Determine that other, less restrictive measures have been tried or would not be effective before applying the restraint  2. Evaluate the patient's condition at the time of restraint application  3. Inform patient/family regarding the reason for restraint  4. Q2H: Monitor safety, psychosocial status, comfort, nutrition and hydration  Outcome: PROGRESSING AS EXPECTED  Flowsheets (Taken 8/27/2020 0430)  Addressed this shift: Remains free of injury from restraints (restraint for interference with medical device):   Every 2 hours: Monitor safety, psychosocial status, comfort, nutrition and hydration   Inform patient/family regarding the reason for restraint   Evaluate the patient's condition at the time of restraint application   Determine that other, less restrictive measures have been tried or would not be effective before applying the restraint

## 2020-08-27 NOTE — PROGRESS NOTES
"Late entry:     1000: Notified Dr. Del Rio with hospitalist team Pt has been scoring >/= 20 on CIWA scale since 0400 this AM.  PRN ativan does not seem to bring CIWA score down.  Dr. Del Rio states she will \"be at bedside soon.\"     2 new ultrasound IVs started by qualified RN r/t previous PIV leaking.    1100:  Pt still scoring >/= 25 on CIWA score.  Both PIVs infiltrated, no IV access.  Pt RASS remains >/= +3, Pt cursing at staff, attempting to get out of bed, and stating \"let me get out of here!\".  2 other RNs called to bedside to attempt to gain IV access.      1115: Pt agrees to taking oral ativan per PRN orders; still unable to obtain IV access.     1140: Pt continues to score 25-28 on CIWA ptorocol, -170, DBP >/= 1000, still no IV access. Dr. Del Rio notified and orders obtained for IM PRN medication, see MAR.     1226: 4 RNs at bedside, Pt's RASS remains +4 and CIWA score remains >/= 25, still unable to obtain IV access.  Pt attempting to hit and kick staff, unable to be redirected.  Dr. Del Rio notified and states she will \"transfer to ICU status.\"      "

## 2020-08-27 NOTE — PROGRESS NOTES
"Critical Care Progress Note    Date of admission  8/24/2020    Chief Complaint  59 y.o. female admitted 8/24/2020 with upper natremia and hypomagnesemia, falls    Hospital Course    \"59 y.o. female who presented 8/24/2020 with a ground-level fall and resultant knee pain.  She states that she has not been able to eat much this week and is at significant nausea and vomiting.  She started feeling weakness in her legs as well as a tingling sensation but denies focal deficits.  Nothing makes it better or worse. Denies fevers, chills, headache, loss of consciousness, chest pain, shortness of breath.     In the emergency department she was found to be hypokalemic and hypomagnesemic.  ICU consulted given the profound nature of these electrolyte disturbances and symptoms.\"    8/25 - electrolytes corrected, mild withdrawal, stable for transfer      Interval Problem Update  Reviewed last 24 hour events:   -No acute events overnight   - Neuro: GCS 14, more pleasant today   - HR: 60s-70s   - SBP: 110s-150s   - GI: tolerating diet   - UOP: poorly measured   - Velásquez: no   - Tm: 36.3   - Lines: PIV, midline   - PPx: GI not indicated, DVT heparin   - 2L NC   - CXR (personally reviewed and compared to prior): No new    Yesterday   - patient transferred back to ICU for worsening agitated delirium secondary to alcohol withdrawal   - Neuro: Agitated, yelling and combative   - HR: 70s-140s   - SBP: 90s-150s   - GI: tolerating diet when able to   - UOP: adequate, not measured   - Velásquez: no   - Tm: 36.8   - Lines: PIV - attempting to obtain   - PPx: GI not indicated, DVT heparin   - RA   - CXR (personally reviewed and compared to prior): no new    Review of Systems  Review of Systems   Unable to perform ROS: Mental acuity        Vital Signs for last 24 hours   Temp:  [35.6 °C (96.1 °F)-36.3 °C (97.3 °F)] 35.6 °C (96.1 °F)  Pulse:  [] 65  Resp:  [15-35] 18  BP: ()/() 155/87  SpO2:  [93 %-100 %] 97 %    Hemodynamic " parameters for last 24 hours       Respiratory Information for the last 24 hours       Physical Exam   Physical Exam  Vitals signs and nursing note reviewed.   Constitutional:       General: She is in acute distress.      Appearance: She is ill-appearing. She is not toxic-appearing.   HENT:      Head: Normocephalic and atraumatic.      Right Ear: External ear normal.      Left Ear: External ear normal.      Nose: Nose normal.      Mouth/Throat:      Mouth: Mucous membranes are dry.   Eyes:      Extraocular Movements: Extraocular movements intact.      Pupils: Pupils are equal, round, and reactive to light.   Cardiovascular:      Rate and Rhythm: Normal rate and regular rhythm.      Pulses: Normal pulses.      Heart sounds: Normal heart sounds.   Pulmonary:      Effort: Pulmonary effort is normal.      Breath sounds: Normal breath sounds.   Abdominal:      General: Abdomen is flat.      Tenderness: There is no guarding or rebound.   Musculoskeletal: Normal range of motion.         General: No swelling, tenderness, deformity or signs of injury.      Right lower leg: No edema.      Left lower leg: No edema.   Skin:     General: Skin is warm and dry.      Capillary Refill: Capillary refill takes less than 2 seconds.   Neurological:      General: No focal deficit present.      Mental Status: She is alert. She is disoriented and confused.      GCS: GCS eye subscore is 4. GCS verbal subscore is 4. GCS motor subscore is 6.      Cranial Nerves: No cranial nerve deficit.      Sensory: No sensory deficit.      Motor: Tremor present. No weakness.      Comments: Remains confused and tremulous however improved from prior exam on 8/26/2020   Psychiatric:         Mood and Affect: Mood is anxious.         Cognition and Memory: Cognition is impaired.         Judgment: Judgment is impulsive.         Medications  Current Facility-Administered Medications   Medication Dose Route Frequency Provider Last Rate Last Dose   • dexmedetomidine  (PRECEDEX) 400 mcg/100mL NS premix infusion  0.1-1 mcg/kg/hr Intravenous Continuous Ervin Arroyo Jr., D.O. 7 mL/hr at 08/27/20 0700 0.4 mcg/kg/hr at 08/27/20 0700   • LORazepam (ATIVAN) injection 2 mg  2 mg Intravenous Q4HRS Ervin Arroyo Jr., D.O.   2 mg at 08/27/20 0807   • LORazepam (ATIVAN) injection 1-2 mg  1-2 mg Intravenous Q2HRS PRN Ervin Arroyo Jr., D.O.       • haloperidol lactate (HALDOL) injection 2.5 mg  2.5 mg Intravenous Q10 MIN PRN Ervin Arroyo Jr., D.O.   2.5 mg at 08/26/20 1333   • omeprazole (PRILOSEC) capsule 20 mg  20 mg Oral BID Ervin Arroyo Jr., D.O.   20 mg at 08/26/20 0550   • folic acid (FOLVITE) tablet 1 mg  1 mg Oral DAILY Curtis Maddox M.D.   1 mg at 08/26/20 0549   • therapeutic multivitamin-minerals (THERAGRAN-M) tablet 1 Tab  1 Tab Oral DAILY Curtis Maddox M.D.   1 Tab at 08/27/20 0807   • acetaminophen (TYLENOL) tablet 650 mg  650 mg Oral Q4HRS PRN Curtis Maddox M.D.   650 mg at 08/25/20 1924   • nicotine (NICODERM) 21 MG/24HR 21 mg  21 mg Transdermal Daily-0600 Corina Pan, A.P.R.N.   21 mg at 08/27/20 0529    And   • nicotine polacrilex (NICORETTE) 2 MG piece 2 mg  2 mg Oral Q HOUR PRN Corina Pan, A.P.R.N.       • heparin injection 5,000 Units  5,000 Units Subcutaneous Q8HRS Jim Sandoval M.D.   5,000 Units at 08/27/20 0528   • promethazine (PHENERGAN) tablet 12.5-25 mg  12.5-25 mg Oral Q4HRS PRN Jim Sandoval M.D.       • promethazine (PHENERGAN) suppository 12.5-25 mg  12.5-25 mg Rectal Q4HRS PRN Jim Sandoval M.D.       • prochlorperazine (COMPAZINE) injection 5-10 mg  5-10 mg Intravenous Q4HRS PRN Jim Sandoval M.D.   10 mg at 08/24/20 2132   • potassium chloride SA (Kdur) tablet 20 mEq  20 mEq Oral BID Ervin Arroyo Jr., D.O.   20 mEq at 08/26/20 0550   • metoclopramide (REGLAN) tablet 10 mg  10 mg Oral ACHS PRN Ervin Arroyo Jr., D.O.   10 mg at 08/25/20 1924       Fluids    Intake/Output Summary (Last 24 hours) at 8/27/2020  0831  Last data filed at 8/27/2020 0400  Gross per 24 hour   Intake 755.27 ml   Output 300 ml   Net 455.27 ml       Laboratory          Recent Labs     08/24/20 2004 08/25/20 0310 08/26/20 0330 08/27/20  0304   SODIUM 131*  --  135 136 140   POTASSIUM 2.5*   < > 3.2* 3.6 3.7   CHLORIDE 88*  --  92* 96 100   CO2 31  --  30 27 25   BUN 10  --  11 11 7*   CREATININE 0.95  --  0.72 0.71 0.47*   MAGNESIUM 2.6*  --   --  1.2* 1.8   PHOSPHORUS  --   --  1.5* 1.6*  --    CALCIUM 8.8  --  8.6 8.6 9.2    < > = values in this interval not displayed.     Recent Labs     08/25/20 0310 08/26/20 0330 08/27/20  0304   ALTSGPT 88* 103* 93*   ASTSGOT 237* 289* 185*   ALKPHOSPHAT 157* 191* 172*   TBILIRUBIN 1.9* 1.3 1.2   DBILIRUBIN  --  0.7*  --    GLUCOSE 75 95 95     Recent Labs     08/25/20 0310 08/26/20 0330 08/27/20  0304   WBC 4.2* 3.6* 4.8   NEUTSPOLYS  --  66.70 64.70   LYMPHOCYTES  --  22.80 19.50*   MONOCYTES  --  3.50 10.80   EOSINOPHILS  --  1.80 2.50   BASOPHILS  --  5.30* 1.90*   ASTSGOT 237* 289* 185*   ALTSGPT 88* 103* 93*   ALKPHOSPHAT 157* 191* 172*   TBILIRUBIN 1.9* 1.3 1.2     Recent Labs     08/25/20 0310 08/25/20  1249 08/26/20 0330 08/27/20  0304   RBC 2.94*  --  3.05* 3.42*   HEMOGLOBIN 10.1*  --  10.4* 11.9*   HEMATOCRIT 28.8*  --  31.3* 35.6*   PLATELETCT 130*  --  156* 181   FERRITIN  --  339.0*  --   --        Imaging  X-Ray:  I have personally reviewed the images and compared with prior images.  CT:    Reviewed    Assessment/Plan  * Hypokalemia- (present on admission)  Assessment & Plan  No ECG changes but symptomatic, initial level 2.2  3.7 this AM  Consistent with history of poor po intake    Alcohol withdrawal delirium, acute, hyperactive (HCC)- (present on admission)  Assessment & Plan  With active drinking and no plans for cessation, encouraged cessation   Thiamine IV, vitamins  Continue Precedex, ativan protocol    Hypomagnesemia- (present on admission)  Assessment & Plan  Secondary to  alcoholism and poor po intake  Continue to replete to patient to maintain >2    Hypophosphatemia- (present on admission)  Assessment & Plan  Replete to >3    Elevated liver enzymes- (present on admission)  Assessment & Plan  Consistent with history of alcohol abuse, and new diagnosis of hepatitis C  RUQ US: Increased hepatic echogenicity, suggestive of steatosis and/or hepatocellular disease.  Trend, mildly improved today  Avoid hepatotoxins  Monitor synthetic functions    Diarrhea- (present on admission)  Assessment & Plan  C diff negative  F/U additional stool studies    Hepatitis C- (present on admission)  Assessment & Plan  Hepatitis C serum positive  Quantitative hepatitis C panel pending  HIV negative    Pancytopenia (HCC)- (present on admission)  Assessment & Plan  Likely related to cirrhosis    Tobacco use- (present on admission)  Assessment & Plan  Nicotine replacement protocol       VTE:  Heparin  Ulcer: Not Indicated  Lines: None    I have performed a physical exam and reviewed and updated ROS and Plan today (8/27/2020). In review of yesterday's note (8/26/2020), there are no changes except as documented above.     Ongoing titration of sedative infusion to manage severe alcohol withdrawal delirium. This patient is critically ill, at high risk for decompensation leading to worsening vital organ dysfunction and death without critical care interventions.    Discussed patient condition and risk of morbidity and/or mortality with Hospitalist, RN, RT, Pharmacy, Dietary, , Charge nurse / hot rounds and Patient       The patient remains critically ill.  Critical care time = 35 minutes in directly providing and coordinating critical care and extensive data review.  No time overlap and excludes procedures

## 2020-08-27 NOTE — PROGRESS NOTES
12 hour chart check     Monitor Summary:     Sinus rhythm to sinus tachycardia, rate of 78-170bpm:     .16/.08/.32

## 2020-08-27 NOTE — CARE PLAN
Problem: Safety  Goal: Will remain free from falls  Outcome: PROGRESSING AS EXPECTED  Intervention: Implement fall precautions  Flowsheets  Taken 8/27/2020 1600  Environmental Precautions:   Treaded Slipper Socks on Patient   Personal Belongings, Wastebasket, Call Bell etc. in Easy Reach   Report Given to Other Health Care Providers Regarding Fall Risk   Bed in Low Position   Communication Sign for Patients & Families   Mobility Assessed & Appropriate Sign Placed  Taken 8/27/2020 1549  Bed Alarm: Yes - Alarm On  Chair/Bed Strip Alarm: Yes - Alarm On  Note: Pt demonstrates ability to ambulate with assistance without injury.      Problem: Safety - Medical Restraint  Goal: Remains free of injury from restraints (Restraint for Interference with Medical Device)  Description: INTERVENTIONS:  1. Determine that other, less restrictive measures have been tried or would not be effective before applying the restraint  2. Evaluate the patient's condition at the time of restraint application  3. Inform patient/family regarding the reason for restraint  4. Q2H: Monitor safety, psychosocial status, comfort, nutrition and hydration  Outcome: PROGRESSING AS EXPECTED  Goal: Free from restraint(s) (Restraint for Interference with Medical Device)  Description: INTERVENTIONS:  1. ONCE/SHIFT or MINIMUM Q12H: Assess and document the continuing need for restraints  2. Q24H: Continued use of restraint requires LIP to perform face to face examination and written order  3. Identify and implement measures to help patient regain control  Outcome: PROGRESSING AS EXPECTED  Flowsheets (Taken 8/27/2020 1549)  Addressed this shift: Free from restraint(s) (restraint for interference with medical device):   ONCE/SHIFT or MINIMUM Every 12 hours: Assess and document the continuing need for restraints   Identify and implement measures to help patient regain control  Note: Pt verbalizes and demonstrates understanding of requirements to discontinue  restraints.  Pt remains free of restraints and free from self/other harm.

## 2020-08-28 PROBLEM — B19.20 HEPATITIS C: Status: ACTIVE | Noted: 2020-08-28

## 2020-08-28 LAB
BASOPHILS # BLD AUTO: 1.6 % (ref 0–1.8)
BASOPHILS # BLD: 0.05 K/UL (ref 0–0.12)
EKG IMPRESSION: NORMAL
EOSINOPHIL # BLD AUTO: 0.06 K/UL (ref 0–0.51)
EOSINOPHIL NFR BLD: 2 % (ref 0–6.9)
ERYTHROCYTE [DISTWIDTH] IN BLOOD BY AUTOMATED COUNT: 85 FL (ref 35.9–50)
HCT VFR BLD AUTO: 31.6 % (ref 37–47)
HCV RNA SERPL NAA+PROBE-ACNC: NOT DETECTED IU/ML
HCV RNA SERPL NAA+PROBE-LOG IU: NOT DETECTED LOG IU/ML
HCV RNA SERPL QL NAA+PROBE: NOT DETECTED
HGB BLD-MCNC: 10.2 G/DL (ref 12–16)
IMM GRANULOCYTES # BLD AUTO: 0.02 K/UL (ref 0–0.11)
IMM GRANULOCYTES NFR BLD AUTO: 0.7 % (ref 0–0.9)
LYMPHOCYTES # BLD AUTO: 1.06 K/UL (ref 1–4.8)
LYMPHOCYTES NFR BLD: 34.9 % (ref 22–41)
MCH RBC QN AUTO: 34.3 PG (ref 27–33)
MCHC RBC AUTO-ENTMCNC: 32.3 G/DL (ref 33.6–35)
MCV RBC AUTO: 106.4 FL (ref 81.4–97.8)
MONOCYTES # BLD AUTO: 0.47 K/UL (ref 0–0.85)
MONOCYTES NFR BLD AUTO: 15.5 % (ref 0–13.4)
NEUTROPHILS # BLD AUTO: 1.38 K/UL (ref 2–7.15)
NEUTROPHILS NFR BLD: 45.3 % (ref 44–72)
NRBC # BLD AUTO: 0 K/UL
NRBC BLD-RTO: 0 /100 WBC
PLATELET # BLD AUTO: 123 K/UL (ref 164–446)
PMV BLD AUTO: 11.5 FL (ref 9–12.9)
RBC # BLD AUTO: 2.97 M/UL (ref 4.2–5.4)
WBC # BLD AUTO: 3 K/UL (ref 4.8–10.8)

## 2020-08-28 PROCEDURE — 700111 HCHG RX REV CODE 636 W/ 250 OVERRIDE (IP): Performed by: INTERNAL MEDICINE

## 2020-08-28 PROCEDURE — 700102 HCHG RX REV CODE 250 W/ 637 OVERRIDE(OP): Performed by: INTERNAL MEDICINE

## 2020-08-28 PROCEDURE — A9270 NON-COVERED ITEM OR SERVICE: HCPCS | Performed by: INTERNAL MEDICINE

## 2020-08-28 PROCEDURE — 93010 ELECTROCARDIOGRAM REPORT: CPT | Performed by: INTERNAL MEDICINE

## 2020-08-28 PROCEDURE — 99291 CRITICAL CARE FIRST HOUR: CPT | Performed by: INTERNAL MEDICINE

## 2020-08-28 PROCEDURE — 85025 COMPLETE CBC W/AUTO DIFF WBC: CPT

## 2020-08-28 PROCEDURE — 93005 ELECTROCARDIOGRAM TRACING: CPT | Performed by: INTERNAL MEDICINE

## 2020-08-28 PROCEDURE — 700102 HCHG RX REV CODE 250 W/ 637 OVERRIDE(OP): Performed by: NURSE PRACTITIONER

## 2020-08-28 PROCEDURE — 770022 HCHG ROOM/CARE - ICU (200)

## 2020-08-28 PROCEDURE — A9270 NON-COVERED ITEM OR SERVICE: HCPCS | Performed by: NURSE PRACTITIONER

## 2020-08-28 RX ORDER — ZOLPIDEM TARTRATE 5 MG/1
5 TABLET ORAL
Status: DISCONTINUED | OUTPATIENT
Start: 2020-08-28 | End: 2020-08-30 | Stop reason: HOSPADM

## 2020-08-28 RX ORDER — CHLORDIAZEPOXIDE HYDROCHLORIDE 25 MG/1
50 CAPSULE, GELATIN COATED ORAL EVERY 8 HOURS
Status: DISCONTINUED | OUTPATIENT
Start: 2020-08-28 | End: 2020-08-30 | Stop reason: HOSPADM

## 2020-08-28 RX ADMIN — LORAZEPAM 2 MG: 2 INJECTION INTRAMUSCULAR; INTRAVENOUS at 13:45

## 2020-08-28 RX ADMIN — ZOLPIDEM TARTRATE 5 MG: 5 TABLET ORAL at 21:49

## 2020-08-28 RX ADMIN — NICOTINE TRANSDERMAL SYSTEM 21 MG: 21 PATCH, EXTENDED RELEASE TRANSDERMAL at 05:28

## 2020-08-28 RX ADMIN — CHLORDIAZEPOXIDE HYDROCHLORIDE 50 MG: 25 CAPSULE ORAL at 18:13

## 2020-08-28 RX ADMIN — HEPARIN SODIUM 5000 UNITS: 5000 INJECTION, SOLUTION INTRAVENOUS; SUBCUTANEOUS at 13:45

## 2020-08-28 RX ADMIN — FOLIC ACID 1 MG: 1 TABLET ORAL at 05:21

## 2020-08-28 RX ADMIN — POTASSIUM CHLORIDE 20 MEQ: 1500 TABLET, EXTENDED RELEASE ORAL at 05:21

## 2020-08-28 RX ADMIN — OMEPRAZOLE 20 MG: 20 CAPSULE, DELAYED RELEASE ORAL at 18:13

## 2020-08-28 RX ADMIN — POTASSIUM CHLORIDE 20 MEQ: 1500 TABLET, EXTENDED RELEASE ORAL at 18:13

## 2020-08-28 RX ADMIN — MULTIPLE VITAMINS W/ MINERALS TAB 1 TABLET: TAB at 05:30

## 2020-08-28 RX ADMIN — CHLORDIAZEPOXIDE HYDROCHLORIDE 50 MG: 25 CAPSULE ORAL at 21:21

## 2020-08-28 RX ADMIN — LORAZEPAM 2 MG: 2 INJECTION INTRAMUSCULAR; INTRAVENOUS at 18:15

## 2020-08-28 RX ADMIN — HEPARIN SODIUM 5000 UNITS: 5000 INJECTION, SOLUTION INTRAVENOUS; SUBCUTANEOUS at 21:21

## 2020-08-28 RX ADMIN — HEPARIN SODIUM 5000 UNITS: 5000 INJECTION, SOLUTION INTRAVENOUS; SUBCUTANEOUS at 05:26

## 2020-08-28 RX ADMIN — METOCLOPRAMIDE 10 MG: 10 TABLET ORAL at 16:37

## 2020-08-28 RX ADMIN — LORAZEPAM 2 MG: 2 INJECTION INTRAMUSCULAR; INTRAVENOUS at 05:24

## 2020-08-28 RX ADMIN — OMEPRAZOLE 20 MG: 20 CAPSULE, DELAYED RELEASE ORAL at 05:21

## 2020-08-28 RX ADMIN — LORAZEPAM 2 MG: 2 INJECTION INTRAMUSCULAR; INTRAVENOUS at 21:20

## 2020-08-28 RX ADMIN — LORAZEPAM 2 MG: 2 INJECTION INTRAMUSCULAR; INTRAVENOUS at 09:49

## 2020-08-28 ASSESSMENT — ENCOUNTER SYMPTOMS
RESPIRATORY NEGATIVE: 1
CARDIOVASCULAR NEGATIVE: 1
CONSTITUTIONAL NEGATIVE: 1
EYES NEGATIVE: 1
MUSCULOSKELETAL NEGATIVE: 1
ABDOMINAL PAIN: 1
DEPRESSION: 1

## 2020-08-28 NOTE — CARE PLAN
Problem: Communication  Goal: The ability to communicate needs accurately and effectively will improve  Outcome: PROGRESSING AS EXPECTED  Intervention: [place] patient and significant other/support system to call light to alert staff of needs  Flowsheets (Taken 8/28/2020 1113)  Oriented to:: All of the Following : Location of Bathroom, Visiting Policy, Unit Routine, Call Light and Bedside Controls, Bedside Rail Policy, Smoking Policy, Rights and Responsibilities, Bedside Report, and Patient Education Notebook     Problem: Safety  Goal: Will remain free from falls  Outcome: PROGRESSING AS EXPECTED  Intervention: Implement fall precautions  Flowsheets  Taken 8/28/2020 1113  Bed Alarm: Yes - Alarm On  Taken 8/28/2020 0800  Environmental Precautions: Treaded Slipper Socks on Patient

## 2020-08-28 NOTE — PROGRESS NOTES
"Critical Care Progress Note    Date of admission  8/24/2020    Chief Complaint  59 y.o. female admitted 8/24/2020 with upper natremia and hypomagnesemia, falls    Hospital Course    \"59 y.o. female who presented 8/24/2020 with a ground-level fall and resultant knee pain.  She states that she has not been able to eat much this week and is at significant nausea and vomiting.  She started feeling weakness in her legs as well as a tingling sensation but denies focal deficits.  Nothing makes it better or worse. Denies fevers, chills, headache, loss of consciousness, chest pain, shortness of breath.     In the emergency department she was found to be hypokalemic and hypomagnesemic.  ICU consulted given the profound nature of these electrolyte disturbances and symptoms.\"    8/25 - electrolytes corrected, mild withdrawal, stable for transfer  8/27- transferred back to ICU for worsening hyperactive delirium      Interval Problem Update  Reviewed last 24 hour events:   - No acute events overnight   - Neuro: GCS 14, more pleasant today   - HR: 70-90   - SBP: 110s-130s   - GI: tolerating diet   - UOP: poorly measured   - Velásquez: no   - Tm: 36   - Lines: PIV, midline   - PPx: GI not indicated, DVT heparin   - 2L NC   - CXR (personally reviewed and compared to prior): No new    Infusions:  Dexmedetomidine    Review of Systems  Review of Systems   Constitutional: Negative.    HENT: Negative.    Eyes: Negative.    Respiratory: Negative.    Cardiovascular: Negative.    Gastrointestinal: Positive for abdominal pain.   Genitourinary: Negative.    Musculoskeletal: Negative.    Neurological:        Occasional \"sharp pancreas pain when I drink too much\"     Psychiatric/Behavioral: Positive for depression.   All other systems reviewed and are negative.       Vital Signs for last 24 hours   Temp:  [36 °C (96.8 °F)] 36 °C (96.8 °F)  Pulse:  [] 106  Resp:  [17-32] 28  BP: ()/(52-86) 81/55  SpO2:  [90 %-97 %] 95 %    Hemodynamic " parameters for last 24 hours       Respiratory Information for the last 24 hours       Physical Exam   Physical Exam  Vitals signs and nursing note reviewed.   Constitutional:       General: She is not in acute distress.     Appearance: She is ill-appearing. She is not toxic-appearing.   HENT:      Head: Normocephalic and atraumatic.      Right Ear: External ear normal.      Left Ear: External ear normal.      Nose: Nose normal.      Mouth/Throat:      Mouth: Mucous membranes are dry.   Eyes:      Extraocular Movements: Extraocular movements intact.      Pupils: Pupils are equal, round, and reactive to light.   Cardiovascular:      Rate and Rhythm: Normal rate and regular rhythm.      Pulses: Normal pulses.      Heart sounds: Normal heart sounds.   Pulmonary:      Effort: Pulmonary effort is normal.      Breath sounds: Normal breath sounds.   Abdominal:      General: Abdomen is flat.      Tenderness: There is no guarding or rebound.   Musculoskeletal: Normal range of motion.         General: No swelling, tenderness, deformity or signs of injury.      Right lower leg: No edema.      Left lower leg: No edema.   Skin:     General: Skin is warm and dry.      Capillary Refill: Capillary refill takes less than 2 seconds.   Neurological:      General: No focal deficit present.      Mental Status: She is alert. She is disoriented and confused.      GCS: GCS eye subscore is 4. GCS verbal subscore is 4. GCS motor subscore is 6.      Cranial Nerves: No cranial nerve deficit.      Sensory: No sensory deficit.      Motor: Tremor present. No weakness.      Comments: Remains confused and tremulous however improved.   Psychiatric:         Mood and Affect: Mood is anxious.         Cognition and Memory: Cognition is impaired.         Judgment: Judgment is impulsive.         Medications  Current Facility-Administered Medications   Medication Dose Route Frequency Provider Last Rate Last Dose   • chlordiazePOXIDE (LIBRIUM) capsule 50 mg   50 mg Oral Q8HRS Ervin Gonzalez M.D.       • dexmedetomidine (PRECEDEX) 400 mcg/100mL NS premix infusion  0.1-1 mcg/kg/hr Intravenous Continuous Ervin Arroyo Jr., D.O.   Stopped at 08/28/20 1123   • LORazepam (ATIVAN) injection 2 mg  2 mg Intravenous Q4HRS Ervin Arroyo Jr., D.O.   2 mg at 08/28/20 1345   • LORazepam (ATIVAN) injection 1-2 mg  1-2 mg Intravenous Q2HRS PRN Ervin Arroyo Jr., D.O.       • haloperidol lactate (HALDOL) injection 2.5 mg  2.5 mg Intravenous Q10 MIN PRN Ervin Arroyo Jr., D.O.   2.5 mg at 08/26/20 1333   • omeprazole (PRILOSEC) capsule 20 mg  20 mg Oral BID Ervin Arroyo Jr., D.O.   20 mg at 08/28/20 0521   • folic acid (FOLVITE) tablet 1 mg  1 mg Oral DAILY Curtis Maddox M.D.   1 mg at 08/28/20 0521   • therapeutic multivitamin-minerals (THERAGRAN-M) tablet 1 Tab  1 Tab Oral DAILY Curtis Maddox M.D.   1 Tab at 08/28/20 0530   • acetaminophen (TYLENOL) tablet 650 mg  650 mg Oral Q4HRS PRN Curtis Maddox M.D.   650 mg at 08/25/20 1924   • nicotine (NICODERM) 21 MG/24HR 21 mg  21 mg Transdermal Daily-0600 Corina Pan, A.P.R.N.   21 mg at 08/28/20 0528    And   • nicotine polacrilex (NICORETTE) 2 MG piece 2 mg  2 mg Oral Q HOUR PRN Corina Pan, A.P.R.N.       • heparin injection 5,000 Units  5,000 Units Subcutaneous Q8HRS Jim Sandoval M.D.   5,000 Units at 08/28/20 1345   • promethazine (PHENERGAN) tablet 12.5-25 mg  12.5-25 mg Oral Q4HRS PRN Jim Sandoval M.D.       • promethazine (PHENERGAN) suppository 12.5-25 mg  12.5-25 mg Rectal Q4HRS PRN Jim Sandoval M.D.       • prochlorperazine (COMPAZINE) injection 5-10 mg  5-10 mg Intravenous Q4HRS PRN Jim Sandoval M.D.   10 mg at 08/24/20 2132   • potassium chloride SA (Kdur) tablet 20 mEq  20 mEq Oral BID Ervin Arroyo Jr., D.O.   20 mEq at 08/28/20 0521   • metoclopramide (REGLAN) tablet 10 mg  10 mg Oral ACHS PRN Ervin Arroyo Jr., D.O.   10 mg at 08/28/20 1637       Fluids    Intake/Output Summary  (Last 24 hours) at 8/28/2020 1726  Last data filed at 8/28/2020 1200  Gross per 24 hour   Intake 1153.14 ml   Output 200 ml   Net 953.14 ml       Laboratory          Recent Labs     08/26/20 0330 08/27/20  0304   SODIUM 136 140   POTASSIUM 3.6 3.7   CHLORIDE 96 100   CO2 27 25   BUN 11 7*   CREATININE 0.71 0.47*   MAGNESIUM 1.2* 1.8   PHOSPHORUS 1.6*  --    CALCIUM 8.6 9.2     Recent Labs     08/26/20 0330 08/27/20  0304   ALTSGPT 103* 93*   ASTSGOT 289* 185*   ALKPHOSPHAT 191* 172*   TBILIRUBIN 1.3 1.2   DBILIRUBIN 0.7*  --    GLUCOSE 95 95     Recent Labs     08/26/20 0330 08/27/20  0304 08/28/20  0529   WBC 3.6* 4.8 3.0*   NEUTSPOLYS 66.70 64.70 45.30   LYMPHOCYTES 22.80 19.50* 34.90   MONOCYTES 3.50 10.80 15.50*   EOSINOPHILS 1.80 2.50 2.00   BASOPHILS 5.30* 1.90* 1.60   ASTSGOT 289* 185*  --    ALTSGPT 103* 93*  --    ALKPHOSPHAT 191* 172*  --    TBILIRUBIN 1.3 1.2  --      Recent Labs     08/26/20 0330 08/27/20  0304 08/28/20  0529   RBC 3.05* 3.42* 2.97*   HEMOGLOBIN 10.4* 11.9* 10.2*   HEMATOCRIT 31.3* 35.6* 31.6*   PLATELETCT 156* 181 123*       Imaging  No new imaging    Assessment/Plan  * Hypokalemia- (present on admission)  Assessment & Plan  No ECG changes but symptomatic, initial level 2.2  3.7 this AM  Consistent with history of poor po intake    Alcohol withdrawal delirium, acute, hyperactive (HCC)- (present on admission)  Assessment & Plan  With active drinking and no plans for cessation, encouraged cessation   Thiamine IV, vitamins  Titrate Precedex  Add Librium taper    Hypomagnesemia- (present on admission)  Assessment & Plan  Secondary to alcoholism and poor po intake  Continue to replete to patient to maintain >2    Hypophosphatemia- (present on admission)  Assessment & Plan  Replete to >3    Elevated liver enzymes- (present on admission)  Assessment & Plan  Consistent with history of alcohol abuse, and new diagnosis of hepatitis C  RUQ US: Increased hepatic echogenicity, suggestive of  steatosis and/or hepatocellular disease.  Trend, mildly improved today  Avoid hepatotoxins  Monitor synthetic functions    Diarrhea- (present on admission)  Assessment & Plan  C diff negative  F/U additional stool studies    Hepatitis C- (present on admission)  Assessment & Plan  Hepatitis C serum positive  Quantitative hepatitis C panel pending  HIV negative    Pancytopenia (HCC)- (present on admission)  Assessment & Plan  Likely related to cirrhosis    Tobacco use- (present on admission)  Assessment & Plan  Nicotine replacement protocol       VTE:  Heparin  Ulcer: Not Indicated  Lines: None    I have performed a physical exam and reviewed and updated ROS and Plan today (8/28/2020). In review of yesterday's note (8/27/2020), there are no changes except as documented above.     Ongoing titration of sedative infusion to manage severe alcohol withdrawal delirium. This patient is critically ill, at high risk for decompensation leading to worsening vital organ dysfunction and death without critical care interventions.    Discussed patient condition and risk of morbidity and/or mortality with Hospitalist, RN, RT, Pharmacy, Dietary, , Charge nurse / hot rounds and Patient       The patient remains critically ill.  Critical care time 35 minutes in directly providing and coordinating critical care and extensive data review.  No time overlap and excludes procedures

## 2020-08-29 LAB
BACTERIA STL CULT: NORMAL
BASOPHILS # BLD AUTO: 2.8 % (ref 0–1.8)
BASOPHILS # BLD: 0.12 K/UL (ref 0–0.12)
E COLI SXT1+2 STL IA: NORMAL
EOSINOPHIL # BLD AUTO: 0.05 K/UL (ref 0–0.51)
EOSINOPHIL NFR BLD: 1.2 % (ref 0–6.9)
ERYTHROCYTE [DISTWIDTH] IN BLOOD BY AUTOMATED COUNT: 89.7 FL (ref 35.9–50)
HCT VFR BLD AUTO: 32.2 % (ref 37–47)
HGB BLD-MCNC: 10.6 G/DL (ref 12–16)
IMM GRANULOCYTES # BLD AUTO: 0.03 K/UL (ref 0–0.11)
IMM GRANULOCYTES NFR BLD AUTO: 0.7 % (ref 0–0.9)
LYMPHOCYTES # BLD AUTO: 1.44 K/UL (ref 1–4.8)
LYMPHOCYTES NFR BLD: 33.9 % (ref 22–41)
MCH RBC QN AUTO: 35.1 PG (ref 27–33)
MCHC RBC AUTO-ENTMCNC: 32.9 G/DL (ref 33.6–35)
MCV RBC AUTO: 106.6 FL (ref 81.4–97.8)
MONOCYTES # BLD AUTO: 0.81 K/UL (ref 0–0.85)
MONOCYTES NFR BLD AUTO: 19.1 % (ref 0–13.4)
NEUTROPHILS # BLD AUTO: 1.8 K/UL (ref 2–7.15)
NEUTROPHILS NFR BLD: 42.3 % (ref 44–72)
NRBC # BLD AUTO: 0 K/UL
NRBC BLD-RTO: 0 /100 WBC
PLATELET # BLD AUTO: 226 K/UL (ref 164–446)
PMV BLD AUTO: 10.8 FL (ref 9–12.9)
RBC # BLD AUTO: 3.65 M/UL (ref 4.2–5.4)
SIGNIFICANT IND 70042: NORMAL
SITE SITE: NORMAL
SOURCE SOURCE: NORMAL
WBC # BLD AUTO: 4.3 K/UL (ref 4.8–10.8)

## 2020-08-29 PROCEDURE — A9270 NON-COVERED ITEM OR SERVICE: HCPCS | Performed by: NURSE PRACTITIONER

## 2020-08-29 PROCEDURE — 700111 HCHG RX REV CODE 636 W/ 250 OVERRIDE (IP): Performed by: INTERNAL MEDICINE

## 2020-08-29 PROCEDURE — A9270 NON-COVERED ITEM OR SERVICE: HCPCS | Performed by: INTERNAL MEDICINE

## 2020-08-29 PROCEDURE — 99233 SBSQ HOSP IP/OBS HIGH 50: CPT | Performed by: HOSPITALIST

## 2020-08-29 PROCEDURE — A9270 NON-COVERED ITEM OR SERVICE: HCPCS | Performed by: HOSPITALIST

## 2020-08-29 PROCEDURE — 700102 HCHG RX REV CODE 250 W/ 637 OVERRIDE(OP): Performed by: INTERNAL MEDICINE

## 2020-08-29 PROCEDURE — 700102 HCHG RX REV CODE 250 W/ 637 OVERRIDE(OP): Performed by: NURSE PRACTITIONER

## 2020-08-29 PROCEDURE — 85025 COMPLETE CBC W/AUTO DIFF WBC: CPT

## 2020-08-29 PROCEDURE — 700102 HCHG RX REV CODE 250 W/ 637 OVERRIDE(OP): Performed by: HOSPITALIST

## 2020-08-29 PROCEDURE — 770001 HCHG ROOM/CARE - MED/SURG/GYN PRIV*

## 2020-08-29 RX ORDER — LORAZEPAM 1 MG/1
1 TABLET ORAL EVERY 4 HOURS
Status: DISCONTINUED | OUTPATIENT
Start: 2020-08-29 | End: 2020-08-30 | Stop reason: HOSPADM

## 2020-08-29 RX ADMIN — HEPARIN SODIUM 5000 UNITS: 5000 INJECTION, SOLUTION INTRAVENOUS; SUBCUTANEOUS at 21:48

## 2020-08-29 RX ADMIN — MULTIPLE VITAMINS W/ MINERALS TAB 1 TABLET: TAB at 05:08

## 2020-08-29 RX ADMIN — POTASSIUM CHLORIDE 20 MEQ: 1500 TABLET, EXTENDED RELEASE ORAL at 17:47

## 2020-08-29 RX ADMIN — POTASSIUM CHLORIDE 20 MEQ: 1500 TABLET, EXTENDED RELEASE ORAL at 05:08

## 2020-08-29 RX ADMIN — METOCLOPRAMIDE 10 MG: 10 TABLET ORAL at 08:21

## 2020-08-29 RX ADMIN — OMEPRAZOLE 20 MG: 20 CAPSULE, DELAYED RELEASE ORAL at 05:08

## 2020-08-29 RX ADMIN — LORAZEPAM 2 MG: 2 INJECTION INTRAMUSCULAR; INTRAVENOUS at 10:09

## 2020-08-29 RX ADMIN — ACETAMINOPHEN 650 MG: 325 TABLET, FILM COATED ORAL at 00:44

## 2020-08-29 RX ADMIN — CHLORDIAZEPOXIDE HYDROCHLORIDE 50 MG: 25 CAPSULE ORAL at 21:48

## 2020-08-29 RX ADMIN — CHLORDIAZEPOXIDE HYDROCHLORIDE 50 MG: 25 CAPSULE ORAL at 13:48

## 2020-08-29 RX ADMIN — NICOTINE TRANSDERMAL SYSTEM 21 MG: 21 PATCH, EXTENDED RELEASE TRANSDERMAL at 05:07

## 2020-08-29 RX ADMIN — HEPARIN SODIUM 5000 UNITS: 5000 INJECTION, SOLUTION INTRAVENOUS; SUBCUTANEOUS at 13:49

## 2020-08-29 RX ADMIN — FOLIC ACID 1 MG: 1 TABLET ORAL at 05:08

## 2020-08-29 RX ADMIN — LORAZEPAM 2 MG: 2 INJECTION INTRAMUSCULAR; INTRAVENOUS at 01:23

## 2020-08-29 RX ADMIN — ZOLPIDEM TARTRATE 5 MG: 5 TABLET ORAL at 00:14

## 2020-08-29 RX ADMIN — LORAZEPAM 2 MG: 2 INJECTION INTRAMUSCULAR; INTRAVENOUS at 05:08

## 2020-08-29 RX ADMIN — OMEPRAZOLE 20 MG: 20 CAPSULE, DELAYED RELEASE ORAL at 17:47

## 2020-08-29 RX ADMIN — LORAZEPAM 1 MG: 1 TABLET ORAL at 21:48

## 2020-08-29 RX ADMIN — CHLORDIAZEPOXIDE HYDROCHLORIDE 50 MG: 25 CAPSULE ORAL at 05:08

## 2020-08-29 RX ADMIN — HEPARIN SODIUM 5000 UNITS: 5000 INJECTION, SOLUTION INTRAVENOUS; SUBCUTANEOUS at 05:07

## 2020-08-29 RX ADMIN — LORAZEPAM 2 MG: 2 INJECTION INTRAMUSCULAR; INTRAVENOUS at 17:47

## 2020-08-29 RX ADMIN — LORAZEPAM 2 MG: 2 INJECTION INTRAMUSCULAR; INTRAVENOUS at 13:48

## 2020-08-29 NOTE — PROGRESS NOTES
Triage officer:  I discussed with Dr. Gonzalez this morning. He has evaluated Ms. Marquis and determined that she is stable for transfer out of the ICU.   She has a hospitalist consult note by Dr. Maddox on 8/25. She is off precedex drip.   Dr. Kruger will see her today and provide a follow up progress note.

## 2020-08-29 NOTE — CARE PLAN
Problem: Safety  Goal: Will remain free from falls  Outcome: PROGRESSING AS EXPECTED  Intervention: Implement fall precautions  Flowsheets (Taken 8/29/2020 1220)  Environmental Precautions:   Treaded Slipper Socks on Patient   Bed in Low Position  Bedrails: Bedrails Closest to Bathroom Down     Problem: Knowledge Deficit  Goal: Knowledge of disease process/condition, treatment plan, diagnostic tests, and medications will improve  Outcome: PROGRESSING SLOWER THAN EXPECTED

## 2020-08-30 VITALS
DIASTOLIC BLOOD PRESSURE: 103 MMHG | RESPIRATION RATE: 18 BRPM | WEIGHT: 149.69 LBS | TEMPERATURE: 96.8 F | HEART RATE: 93 BPM | OXYGEN SATURATION: 98 % | HEIGHT: 64 IN | BODY MASS INDEX: 25.56 KG/M2 | SYSTOLIC BLOOD PRESSURE: 138 MMHG

## 2020-08-30 PROBLEM — R19.7 DIARRHEA: Status: RESOLVED | Noted: 2019-10-01 | Resolved: 2020-08-30

## 2020-08-30 PROBLEM — E83.39 HYPOPHOSPHATEMIA: Status: RESOLVED | Noted: 2020-08-25 | Resolved: 2020-08-30

## 2020-08-30 PROBLEM — F10.931 ALCOHOL WITHDRAWAL DELIRIUM, ACUTE, HYPERACTIVE (HCC): Status: RESOLVED | Noted: 2019-04-22 | Resolved: 2020-08-30

## 2020-08-30 PROCEDURE — 700102 HCHG RX REV CODE 250 W/ 637 OVERRIDE(OP): Performed by: INTERNAL MEDICINE

## 2020-08-30 PROCEDURE — A9270 NON-COVERED ITEM OR SERVICE: HCPCS | Performed by: INTERNAL MEDICINE

## 2020-08-30 PROCEDURE — 99285 EMERGENCY DEPT VISIT HI MDM: CPT

## 2020-08-30 PROCEDURE — 700111 HCHG RX REV CODE 636 W/ 250 OVERRIDE (IP): Performed by: INTERNAL MEDICINE

## 2020-08-30 PROCEDURE — A9270 NON-COVERED ITEM OR SERVICE: HCPCS | Performed by: HOSPITALIST

## 2020-08-30 PROCEDURE — 99239 HOSP IP/OBS DSCHRG MGMT >30: CPT | Performed by: HOSPITALIST

## 2020-08-30 PROCEDURE — 700102 HCHG RX REV CODE 250 W/ 637 OVERRIDE(OP): Performed by: HOSPITALIST

## 2020-08-30 RX ORDER — OMEPRAZOLE 20 MG/1
40 CAPSULE, DELAYED RELEASE ORAL 2 TIMES DAILY
Qty: 30 CAP | Refills: 0 | Status: ON HOLD | OUTPATIENT
Start: 2020-08-30 | End: 2022-01-01

## 2020-08-30 RX ORDER — POTASSIUM CHLORIDE 20 MEQ/1
20 TABLET, EXTENDED RELEASE ORAL 2 TIMES DAILY
Qty: 20 TAB | Refills: 0 | Status: SHIPPED | OUTPATIENT
Start: 2020-08-30 | End: 2020-09-09

## 2020-08-30 RX ORDER — M-VIT,TX,IRON,MINS/CALC/FOLIC 27MG-0.4MG
1 TABLET ORAL DAILY
Qty: 30 TAB | Refills: 11 | Status: SHIPPED | OUTPATIENT
Start: 2020-08-31 | End: 2021-04-27

## 2020-08-30 RX ORDER — FOLIC ACID 1 MG/1
1 TABLET ORAL DAILY
Qty: 30 TAB | Refills: 0 | Status: SHIPPED | OUTPATIENT
Start: 2020-08-31 | End: 2021-04-27

## 2020-08-30 RX ADMIN — LORAZEPAM 1 MG: 1 TABLET ORAL at 06:07

## 2020-08-30 RX ADMIN — FOLIC ACID 1 MG: 1 TABLET ORAL at 06:07

## 2020-08-30 RX ADMIN — POTASSIUM CHLORIDE 20 MEQ: 1500 TABLET, EXTENDED RELEASE ORAL at 06:07

## 2020-08-30 RX ADMIN — OMEPRAZOLE 20 MG: 20 CAPSULE, DELAYED RELEASE ORAL at 06:07

## 2020-08-30 RX ADMIN — CHLORDIAZEPOXIDE HYDROCHLORIDE 50 MG: 25 CAPSULE ORAL at 06:07

## 2020-08-30 RX ADMIN — MULTIPLE VITAMINS W/ MINERALS TAB 1 TABLET: TAB at 06:07

## 2020-08-30 RX ADMIN — CHLORDIAZEPOXIDE HYDROCHLORIDE 50 MG: 25 CAPSULE ORAL at 13:01

## 2020-08-30 RX ADMIN — LORAZEPAM 1 MG: 1 TABLET ORAL at 09:12

## 2020-08-30 RX ADMIN — HEPARIN SODIUM 5000 UNITS: 5000 INJECTION, SOLUTION INTRAVENOUS; SUBCUTANEOUS at 06:06

## 2020-08-30 RX ADMIN — LORAZEPAM 1 MG: 1 TABLET ORAL at 00:51

## 2020-08-30 ASSESSMENT — ENCOUNTER SYMPTOMS
EYE PAIN: 0
CLAUDICATION: 0
HALLUCINATIONS: 0
FEVER: 0
EYES NEGATIVE: 1
MUSCULOSKELETAL NEGATIVE: 1
NERVOUS/ANXIOUS: 0
TREMORS: 0
COUGH: 0
NECK PAIN: 0
ABDOMINAL PAIN: 1
CARDIOVASCULAR NEGATIVE: 1
NAUSEA: 0
DIZZINESS: 0
RESPIRATORY NEGATIVE: 1
VOMITING: 0
SENSORY CHANGE: 0
MYALGIAS: 0
SPUTUM PRODUCTION: 0
BLURRED VISION: 0
CONSTITUTIONAL NEGATIVE: 1
DEPRESSION: 1
WEIGHT LOSS: 0
SHORTNESS OF BREATH: 0
BACK PAIN: 0

## 2020-08-30 ASSESSMENT — FIBROSIS 4 INDEX: FIB4 SCORE: 4.84

## 2020-08-30 ASSESSMENT — LIFESTYLE VARIABLES: SUBSTANCE_ABUSE: 0

## 2020-08-30 NOTE — PROGRESS NOTES
Hospital Medicine Daily Progress Note    Date of Service  8/29/20  Chief Complaint  59 y.o. female admitted 8/24/2020 with etoh    Hospital Course    59 y.o. female with alcohol dependence, history of CHF, hypertension, who presented 8/24/2020 with ground-level fall, resultant knee pain.  She states she has not been eating much for 1 week, due to significant nausea and vomiting.  Initial work-up showed significant hypokalemia and hypomagnesemia.  Her potassium was 2.2, with sodium of 129, and magnesium of 0.9.  She is given IV potassium and magnesium replacement.  She did have elevated liver enzymes, and right upper quadrant ultrasound showed increased hepatic echogenicity suggestive of steatosis.  She is monitored for alcohol withdrawal.      With replacement, her potassium improved, and her sodium have normalized.  This morning, she has a potassium of 3.2, and sodium of 135.  Her magnesium level improved to 2.6.  She initially had a CIWA score of 17, and with benzodiazepine dosing, has gone down to 4 today. States her last drink was on the day prior to admission. She is still tremulous. She states she is not nauseated anymore, but still has ongoing diarrhea. No abd pain. No CP, SOB. No fevers or chills.       Interval Problem Update  Seen and examined, patient doing ok, off CIWA for etoh, wanting doxepin for sleep, we discussed that its likely not safe given she is just finished etoh withdrawal  Labs reviewed, continue k and mg replacement  She is ambulating ok, eating and drinking well    Consultants/Specialty  icu    Code Status  Full Code    Disposition  tbd    Review of Systems  Review of Systems   Constitutional: Negative.  Negative for fever and weight loss.   HENT: Negative.  Negative for congestion, ear discharge and nosebleeds.    Eyes: Negative.  Negative for blurred vision and pain.   Respiratory: Negative.  Negative for cough, sputum production and shortness of breath.    Cardiovascular: Negative.   "Negative for chest pain, claudication and leg swelling.   Gastrointestinal: Positive for abdominal pain. Negative for nausea and vomiting.   Genitourinary: Negative.  Negative for dysuria and frequency.   Musculoskeletal: Negative.  Negative for back pain, myalgias and neck pain.   Skin: Negative for itching and rash.   Neurological: Negative for dizziness, tremors and sensory change.        Occasional \"sharp pancreas pain when I drink too much\"     Psychiatric/Behavioral: Positive for depression. Negative for hallucinations and substance abuse. The patient is not nervous/anxious.    All other systems reviewed and are negative.       Physical Exam  Temp:  [36 °C (96.8 °F)-36.7 °C (98 °F)] 36.4 °C (97.6 °F)  Pulse:  [] 100  Resp:  [18] 18  BP: (110-150)/() 145/100  SpO2:  [93 %-99 %] 98 %    Physical Exam  Vitals signs and nursing note reviewed.   Constitutional:       General: She is not in acute distress.     Appearance: She is ill-appearing. She is not toxic-appearing.   HENT:      Head: Normocephalic and atraumatic.      Right Ear: External ear normal.      Left Ear: External ear normal.      Nose: Nose normal.      Mouth/Throat:      Mouth: Mucous membranes are dry.   Eyes:      Extraocular Movements: Extraocular movements intact.      Pupils: Pupils are equal, round, and reactive to light.   Cardiovascular:      Rate and Rhythm: Normal rate and regular rhythm.      Pulses: Normal pulses.      Heart sounds: Normal heart sounds.   Pulmonary:      Effort: Pulmonary effort is normal.      Breath sounds: Normal breath sounds.   Abdominal:      General: Abdomen is flat.      Tenderness: There is no guarding or rebound.   Musculoskeletal: Normal range of motion.         General: No swelling, tenderness, deformity or signs of injury.      Right lower leg: No edema.      Left lower leg: No edema.   Skin:     General: Skin is warm and dry.      Capillary Refill: Capillary refill takes less than 2 seconds. "   Neurological:      General: No focal deficit present.      Mental Status: She is alert. She is disoriented and confused.      GCS: GCS eye subscore is 4. GCS verbal subscore is 4. GCS motor subscore is 6.      Cranial Nerves: No cranial nerve deficit.      Sensory: No sensory deficit.      Motor: Tremor present. No weakness.      Comments: Remains confused and tremulous however improved.   Psychiatric:         Mood and Affect: Mood is anxious.         Cognition and Memory: Cognition is impaired.         Judgment: Judgment is impulsive.         Fluids    Intake/Output Summary (Last 24 hours) at 8/30/2020 0841  Last data filed at 8/29/2020 1400  Gross per 24 hour   Intake 540 ml   Output --   Net 540 ml       Laboratory  Recent Labs     08/28/20  0529 08/29/20  0332   WBC 3.0* 4.3*   RBC 2.97* 3.65*   HEMOGLOBIN 10.2* 10.6*   HEMATOCRIT 31.6* 32.2*   .4* 106.6*   MCH 34.3* 35.1*   MCHC 32.3* 32.9*   RDW 85.0* 89.7*   PLATELETCT 123* 226   MPV 11.5 10.8                       Imaging  IR-MIDLINE CATHETER INSERTION WO GUIDANCE > AGE 3   Final Result                  Ultrasound-guided midline placement performed by qualified nursing staff    as above.          US-RUQ   Final Result      1.  No acute sonographic abnormality. No gallstones.   2.  Increased hepatic echogenicity, suggestive of steatosis and/or hepatocellular disease.      DX-CHEST-2 VIEWS   Final Result      No acute cardiopulmonary abnormality.      CT-HEAD W/O   Final Result      1. No CT evidence of acute infarct, hemorrhage or mass.   2. Mild to moderate global parenchymal atrophy. Chronic small vessel ischemic changes.           Assessment/Plan  * Hypokalemia- (present on admission)  Assessment & Plan  Resolved  following    Alcohol withdrawal delirium, acute, hyperactive (HCC)- (present on admission)  Assessment & Plan  Ongoing  completed CIWA- on librium  Continue IV thiamine, folate, and multivitamin.   Following closely  Off precedex,  monitor       Hypomagnesemia- (present on admission)  Assessment & Plan  Continue replacement  following    Hypophosphatemia- (present on admission)  Assessment & Plan  Replacing  following    Elevated liver enzymes- (present on admission)  Assessment & Plan  etoh abuse likely contributing  Hep C ab positive, NR hiv      Diarrhea- (present on admission)  Assessment & Plan  C dif pending  following    Pancytopenia (HCC)- (present on admission)  Assessment & Plan  Chronic  Likely due to etoh toxicity  following    Tobacco use- (present on admission)  Assessment & Plan  Cessation along with etoh cessation discussed and recommended       Doing much better, anticipate dc in 1-2 days pending clinical improvement and electrolytes stability    I spent a total of 42 minutes of time during this clinical encounter of which > 50% was devoted to counseling and coordinating care including review of records, pertinent lab data and studies, as well as discussing diagnostic evaluation and work up, planned therapeutic interventions and future disposition of care. Where indicated, the assessment and plan reflect discussion of patient with consultants, other healthcare providers, family members, and additional research needed to obtain further information in formulating the plan of care of this patient. This time includes no procedures or overlap with other providers.    VTE prophylaxis: heparin

## 2020-08-30 NOTE — PROGRESS NOTES
"Patient walked out into the hallway with her midline IV in her hand stated \"I took it out\".  This RN assessed the site and placed 2x2 and tape on site. No drainage or erythema present.  "

## 2020-08-30 NOTE — DISCHARGE SUMMARY
"Discharge Summary    CHIEF COMPLAINT ON ADMISSION  Chief Complaint   Patient presents with   • Fall     Pt reports fall last night, reports right knee pain, bilateral elbow pain, and reports she hit her head. No LOC. ETOH involved.        Reason for Admission  ETOH     Admission Date  8/24/2020    CODE STATUS  Full Code    HPI & HOSPITAL COURSE    \"59 y.o. female with alcohol dependence, history of CHF, hypertension, who presented 8/24/2020 with ground-level fall, resultant knee pain.  She states she has not been eating much for 1 week, due to significant nausea and vomiting.  Initial work-up showed significant hypokalemia and hypomagnesemia.  Her potassium was 2.2, with sodium of 129, and magnesium of 0.9.  She was given IV potassium and magnesium replacement.  She did have elevated liver enzymes, and right upper quadrant ultrasound showed increased hepatic echogenicity suggestive of steatosis.  She is monitored for alcohol withdrawal.      With replacement, her potassium improved, and her sodium have normalized.  This morning, she has a potassium of 3.2, and sodium of 135.  Her magnesium level improved to 2.6.  She initially had a CIWA score of 17, and with benzodiazepine dosing, has gone down to 4 today. States her last drink was on the day prior to admission. She is still tremulous. She states she is not nauseated anymore, but still has ongoing diarrhea. No abd pain. No CP, SOB. No fevers or chills\" per the admission HPI    She was admitted to the ICU for continued monitor given her low electrolytes. She was started on precedex. Patient did well, she was transitioned to librium  Electrolytes were monitored closely and replaced as needed  She has been doing great since then, symptoms have largely resolved, at this time she is medically stable to be dc, she states she will go back to [place] after dc. I counseled her on etoh cessation and to take her vitamins and supplements as prescribed and to fu with PCP as " soon as she can in [place] to establish care.  Patient understood and agreed with the above plan.    Therefore, she is discharged in guarded and stable condition to home with close outpatient follow-up.    The patient met 2-midnight criteria for an inpatient stay at the time of discharge.    Discharge Date  8/30/20    FOLLOW UP ITEMS POST DISCHARGE  Pcp; patient states she is going to [place] after dc and I encouraged her to get a PCP there    DISCHARGE DIAGNOSES  Principal Problem (Resolved):    Hypokalemia POA: Yes  Active Problems:    Elevated liver enzymes POA: Yes    Tobacco use POA: Yes    Pancytopenia (HCC) POA: Yes    Hepatitis C POA: Yes  Resolved Problems:    Hypomagnesemia POA: Yes    Alcohol withdrawal delirium, acute, hyperactive (HCC) POA: Yes    Diarrhea POA: Yes    Hyponatremia POA: Yes    Hypophosphatemia POA: Yes      FOLLOW UP  No future appointments.  No follow-up provider specified.    MEDICATIONS ON DISCHARGE     Medication List      START taking these medications      Instructions   folic acid 1 MG Tabs  Start taking on: August 31, 2020  Commonly known as: FOLVITE   Take 1 Tab by mouth every day.  Dose: 1 mg     magnesium oxide 400 MG Tabs tablet  Commonly known as: MAG-OX   Take 1 Tab by mouth every day for 10 days.  Dose: 400 mg     omeprazole 20 MG delayed-release capsule  Commonly known as: PRILOSEC   Take 2 Caps by mouth 2 Times a Day.  Dose: 40 mg     potassium chloride SA 20 MEQ Tbcr  Commonly known as: Kdur   Take 1 Tab by mouth 2 times a day for 10 days.  Dose: 20 mEq     therapeutic multivitamin-minerals Tabs  Start taking on: August 31, 2020   Take 1 Tab by mouth every day.  Dose: 1 Tab            Allergies  Allergies   Allergen Reactions   • Sulfa Drugs Vomiting   • Toradol Vomiting       DIET  Orders Placed This Encounter   Procedures   • Diet Order Regular     Standing Status:   Standing     Number of Occurrences:   1     Order Specific Question:   Diet:     Answer:    Regular [1]       ACTIVITY  As tolerated.  Weight bearing as tolerated    CONSULTATIONS  icu    PROCEDURES  none    LABORATORY  Lab Results   Component Value Date    SODIUM 140 08/27/2020    POTASSIUM 3.7 08/27/2020    CHLORIDE 100 08/27/2020    CO2 25 08/27/2020    GLUCOSE 95 08/27/2020    BUN 7 (L) 08/27/2020    CREATININE 0.47 (L) 08/27/2020    CREATININE 0.8 05/01/2009        Lab Results   Component Value Date    WBC 4.3 (L) 08/29/2020    HEMOGLOBIN 10.6 (L) 08/29/2020    HEMATOCRIT 32.2 (L) 08/29/2020    PLATELETCT 226 08/29/2020        Total time of the discharge process exceeds 45 minutes.

## 2020-08-30 NOTE — PROGRESS NOTES
Pt discharged from hospital via wheelchair with CNA to bus stop.  Pt denied pain.   Pt educated on compliance of taking medications and making a follow up appointment within the next two weeks with a primary care physician.  Pt had all belongings upon discharge

## 2020-08-30 NOTE — DISCHARGE INSTRUCTIONS
Discharge Instructions    Discharged to other by taxi with self. Discharged via wheelchair, hospital escort: Yes.  Special equipment needed: Not Applicable    Be sure to schedule a follow-up appointment with your primary care doctor or any specialists as instructed.     Discharge Plan:        I understand that a diet low in cholesterol, fat, and sodium is recommended for good health. Unless I have been given specific instructions below for another diet, I accept this instruction as my diet prescription.   Other diet: Regular    Special Instructions: None    · Is patient discharged on Warfarin / Coumadin?   No     Depression / Suicide Risk    As you are discharged from this [facility] facility, it is important to learn how to keep safe from harming yourself.    Recognize the warning signs:  · Abrupt changes in personality, positive or negative- including increase in energy   · Giving away possessions  · Change in eating patterns- significant weight changes-  positive or negative  · Change in sleeping patterns- unable to sleep or sleeping all the time   · Unwillingness or inability to communicate  · Depression  · Unusual sadness, discouragement and loneliness  · Talk of wanting to die  · Neglect of personal appearance   · Rebelliousness- reckless behavior  · Withdrawal from people/activities they love  · Confusion- inability to concentrate     If you or a loved one observes any of these behaviors or has concerns about self-harm, here's what you can do:  · Talk about it- your feelings and reasons for harming yourself  · Remove any means that you might use to hurt yourself (examples: pills, rope, extension cords, firearm)  · Get professional help from the community (Mental Health, Substance Abuse, psychological counseling)  · Do not be alone:Call your Safe Contact- someone whom you trust who will be there for you.  · Call your local CRISIS HOTLINE 658-6099 or 369-429-9704  · Call your local Children's Mobile Crisis  Response Team [facility][1] (702) 686-4690 or www.[employer]  · Call the toll free National Suicide Prevention Hotlines   · National Suicide Prevention Lifeline 392-633-GMED (4013)  · National Hope Line Network 800-SUICIDE (850-4234)

## 2020-08-31 ENCOUNTER — HOSPITAL ENCOUNTER (EMERGENCY)
Facility: MEDICAL CENTER | Age: 60
End: 2020-08-31
Attending: EMERGENCY MEDICINE
Payer: MEDICAID

## 2020-08-31 VITALS
HEIGHT: 64 IN | TEMPERATURE: 98.1 F | DIASTOLIC BLOOD PRESSURE: 65 MMHG | BODY MASS INDEX: 24.75 KG/M2 | RESPIRATION RATE: 18 BRPM | WEIGHT: 145 LBS | OXYGEN SATURATION: 96 % | SYSTOLIC BLOOD PRESSURE: 134 MMHG | HEART RATE: 75 BPM

## 2020-08-31 DIAGNOSIS — R11.0 NAUSEA: ICD-10-CM

## 2020-08-31 DIAGNOSIS — F10.10 ALCOHOL ABUSE: ICD-10-CM

## 2020-08-31 LAB
ALBUMIN SERPL BCP-MCNC: 3.8 G/DL (ref 3.2–4.9)
ALBUMIN/GLOB SERPL: 1.1 G/DL
ALP SERPL-CCNC: 154 U/L (ref 30–99)
ALT SERPL-CCNC: 81 U/L (ref 2–50)
ANION GAP SERPL CALC-SCNC: 14 MMOL/L (ref 7–16)
ANISOCYTOSIS BLD QL SMEAR: ABNORMAL
AST SERPL-CCNC: 128 U/L (ref 12–45)
BASOPHILS # BLD AUTO: 1.8 % (ref 0–1.8)
BASOPHILS # BLD: 0.14 K/UL (ref 0–0.12)
BILIRUB SERPL-MCNC: 0.6 MG/DL (ref 0.1–1.5)
BUN SERPL-MCNC: 9 MG/DL (ref 8–22)
CALCIUM SERPL-MCNC: 10 MG/DL (ref 8.5–10.5)
CHLORIDE SERPL-SCNC: 103 MMOL/L (ref 96–112)
CO2 SERPL-SCNC: 21 MMOL/L (ref 20–33)
COMMENT 1642: NORMAL
CREAT SERPL-MCNC: 0.7 MG/DL (ref 0.5–1.4)
EOSINOPHIL # BLD AUTO: 0.08 K/UL (ref 0–0.51)
EOSINOPHIL NFR BLD: 1.1 % (ref 0–6.9)
ERYTHROCYTE [DISTWIDTH] IN BLOOD BY AUTOMATED COUNT: 89 FL (ref 35.9–50)
ETHANOL BLD-MCNC: 12.5 MG/DL (ref 0–10.1)
GLOBULIN SER CALC-MCNC: 3.6 G/DL (ref 1.9–3.5)
GLUCOSE SERPL-MCNC: 70 MG/DL (ref 65–99)
HCT VFR BLD AUTO: 36.4 % (ref 37–47)
HGB BLD-MCNC: 11.8 G/DL (ref 12–16)
IMM GRANULOCYTES # BLD AUTO: 0.06 K/UL (ref 0–0.11)
IMM GRANULOCYTES NFR BLD AUTO: 0.8 % (ref 0–0.9)
LIPASE SERPL-CCNC: 88 U/L (ref 11–82)
LYMPHOCYTES # BLD AUTO: 1.49 K/UL (ref 1–4.8)
LYMPHOCYTES NFR BLD: 19.6 % (ref 22–41)
MACROCYTES BLD QL SMEAR: ABNORMAL
MCH RBC QN AUTO: 35.1 PG (ref 27–33)
MCHC RBC AUTO-ENTMCNC: 32.4 G/DL (ref 33.6–35)
MCV RBC AUTO: 108.3 FL (ref 81.4–97.8)
MONOCYTES # BLD AUTO: 1.11 K/UL (ref 0–0.85)
MONOCYTES NFR BLD AUTO: 14.6 % (ref 0–13.4)
MORPHOLOGY BLD-IMP: NORMAL
NEUTROPHILS # BLD AUTO: 4.73 K/UL (ref 2–7.15)
NEUTROPHILS NFR BLD: 62.1 % (ref 44–72)
NORWALK VIRUS PCR NORWK1: NORMAL
NRBC # BLD AUTO: 0 K/UL
NRBC BLD-RTO: 0 /100 WBC
PLATELET # BLD AUTO: 290 K/UL (ref 164–446)
PLATELET BLD QL SMEAR: NORMAL
PMV BLD AUTO: 10.4 FL (ref 9–12.9)
POTASSIUM SERPL-SCNC: 4.3 MMOL/L (ref 3.6–5.5)
PROT SERPL-MCNC: 7.4 G/DL (ref 6–8.2)
RBC # BLD AUTO: 3.36 M/UL (ref 4.2–5.4)
RBC BLD AUTO: PRESENT
SODIUM SERPL-SCNC: 138 MMOL/L (ref 135–145)
WBC # BLD AUTO: 7.6 K/UL (ref 4.8–10.8)

## 2020-08-31 PROCEDURE — 83690 ASSAY OF LIPASE: CPT

## 2020-08-31 PROCEDURE — 36415 COLL VENOUS BLD VENIPUNCTURE: CPT

## 2020-08-31 PROCEDURE — 80053 COMPREHEN METABOLIC PANEL: CPT

## 2020-08-31 PROCEDURE — 96374 THER/PROPH/DIAG INJ IV PUSH: CPT

## 2020-08-31 PROCEDURE — 85025 COMPLETE CBC W/AUTO DIFF WBC: CPT

## 2020-08-31 PROCEDURE — 700111 HCHG RX REV CODE 636 W/ 250 OVERRIDE (IP): Performed by: EMERGENCY MEDICINE

## 2020-08-31 PROCEDURE — 80307 DRUG TEST PRSMV CHEM ANLYZR: CPT

## 2020-08-31 RX ORDER — ONDANSETRON 4 MG/1
4 TABLET, ORALLY DISINTEGRATING ORAL EVERY 6 HOURS PRN
Qty: 20 TAB | Refills: 0 | Status: SHIPPED | OUTPATIENT
Start: 2020-08-31 | End: 2022-01-01

## 2020-08-31 RX ORDER — ONDANSETRON 2 MG/ML
4 INJECTION INTRAMUSCULAR; INTRAVENOUS ONCE
Status: COMPLETED | OUTPATIENT
Start: 2020-08-31 | End: 2020-08-31

## 2020-08-31 RX ADMIN — ONDANSETRON 4 MG: 2 INJECTION INTRAMUSCULAR; INTRAVENOUS at 02:15

## 2020-08-31 ASSESSMENT — ENCOUNTER SYMPTOMS
NAUSEA: 1
COUGH: 0
FEVER: 0
VOMITING: 1

## 2020-08-31 NOTE — ED NOTES
Pt A&Ox4, given discharge instructions. Discussed follow-up, diet, activity, symptoms and management and hard copy prescriptions provided for zofran. Pt verbalized understanding of all discharge instructions. All questions answered. IV d/c'd. Pt ambulated out of ER, all belongings accounted for.

## 2020-08-31 NOTE — ED PROVIDER NOTES
ED Provider Note    Scribed for Irena Flores M.D. by Mady Singh. 8/31/2020, 2:07 AM.    Primary care provider: CACHORRO Anthony  Means of arrival: Walk-in  History obtained from: Patient  History limited by: None    CHIEF COMPLAINT  Chief Complaint   Patient presents with   • ETOH Intoxication       HPI  Ashleigh Marquis is a 59 y.o. female who presents to the Emergency Department for evaluation of alcohol intoxication and nausea. She was discharged from the hospital this morning. She reports that she only drank two beers and began to feel nauseated.  She states that she has been unable to keep anything down.  She was found by bystander standing in the middle of the street and was brought into the hospital for further evaluation.  She denies any cough or fever. There are no known alleviating or exacerbating factors.  Denies abdominal pain, chest pain and shortness of breath.    Per chart review patient was just discharged today after being hospitalized on 8/24/2024 hypokalemia and hyponatremia secondary to malnutrition.    REVIEW OF SYSTEMS  Review of Systems   Constitutional: Negative for fever.        Intoxication   Respiratory: Negative for cough.    Gastrointestinal: Positive for nausea and vomiting.   All other systems reviewed and are negative.      PAST MEDICAL HISTORY   has a past medical history of Alcoholism ([facility]), Anxiety, Arthritis, ASTHMA, Cancer ([facility]) (1981), Chronic low back pain, Congestive heart failure ([facility]), Depression, EtOH dependence ([facility]), Fall, GERD (gastroesophageal reflux disease), HTN, Hypertension, Indigestion, Muscle disorder, OSTEOPOROSIS, Other specified symptom associated with female genital organs, Psychiatric disorder, PTSD (post-traumatic stress disorder), Renal disorder, Seizure ([facility]), Ulcer, and Vitamin D deficiency.    SURGICAL HISTORY   has a past surgical history that includes hernia repair (1977); cysto stent placemnt pre surg (10/7/2010); cystoscopy  "stent placement (11/9/2010); ureteroscopy (11/9/2010); lasertripsy (11/9/2010); stent removal (11/9/2010); and gastroscopy-endo (N/A, 10/3/2018).    SOCIAL HISTORY  Social History     Tobacco Use   • Smoking status: Current Every Day Smoker     Packs/day: 0.50     Years: 20.00     Pack years: 10.00     Types: Cigarettes   • Smokeless tobacco: Never Used   • Tobacco comment: 1/2 pack per day   Substance Use Topics   • Alcohol use: Yes     Frequency: 4 or more times a week     Binge frequency: Daily or almost daily     Comment: vodka, heavy use   • Drug use: No      Social History     Substance and Sexual Activity   Drug Use No       FAMILY HISTORY  Family History   Problem Relation Age of Onset   • Heart Disease Father    • Hypertension Father    • Diabetes Father    • Cancer Paternal Grandmother    • Depression Other    • Lung Disease Neg Hx    • Stroke Neg Hx        CURRENT MEDICATIONS  Current Outpatient Medications   Medication Instructions   • folic acid (FOLVITE) 1 mg, Oral, DAILY   • magnesium oxide (MAG-OX) 400 mg, Oral, DAILY   • omeprazole (PRILOSEC) 40 mg, Oral, 2 TIMES DAILY   • potassium chloride SA (KDUR) 20 MEQ Tab CR 20 mEq, Oral, 2 TIMES DAILY   • therapeutic multivitamin-minerals (THERAGRAN-M) Tab 1 Tab, Oral, DAILY       ALLERGIES  Allergies   Allergen Reactions   • Sulfa Drugs Vomiting   • Toradol Vomiting       PHYSICAL EXAM  VITAL SIGNS: /70   Pulse (!) 108   Temp 36.3 °C (97.3 °F) (Temporal)   Resp 18   Ht 1.626 m (5' 4\")   Wt 65.8 kg (145 lb)   LMP 11/02/2015   SpO2 98%   BMI 24.89 kg/m²   Vitals reviewed by myself.  Nursing note and vitals reviewed.  Constitutional: Well-developed and well-nourished. No acute distress.  HENT: Head is normocephalic and atraumatic.  Eyes: extra-ocular movements intact  Cardiovascular: Tachycardic rate and regular rhythm. No murmur heard.  Pulmonary/Chest: Breath sounds normal. No wheezes or rales.   Abdominal: Soft. No distention.  No tenderness, " no rebound, no guarding  Musculoskeletal: Extremities exhibit normal range of motion without edema or tenderness.   Neurological: Awake. Slurred speech  Skin: Skin is warm and dry. No rash.       DIAGNOSTIC STUDIES /  LABS  Labs Reviewed   CBC WITH DIFFERENTIAL - Abnormal; Notable for the following components:       Result Value    RBC 3.36 (*)     Hemoglobin 11.8 (*)     Hematocrit 36.4 (*)     .3 (*)     MCH 35.1 (*)     MCHC 32.4 (*)     RDW 89.0 (*)     Lymphocytes 19.60 (*)     Monocytes 14.60 (*)     Monos (Absolute) 1.11 (*)     Baso (Absolute) 0.14 (*)     All other components within normal limits   COMP METABOLIC PANEL - Abnormal; Notable for the following components:    AST(SGOT) 128 (*)     ALT(SGPT) 81 (*)     Alkaline Phosphatase 154 (*)     Globulin 3.6 (*)     All other components within normal limits    Narrative:     SPECIMEN IS A RECOLLECT   LIPASE - Abnormal; Notable for the following components:    Lipase 88 (*)     All other components within normal limits    Narrative:     SPECIMEN IS A RECOLLECT   DIAGNOSTIC ALCOHOL - Abnormal; Notable for the following components:    Diagnostic Alcohol 12.5 (*)     All other components within normal limits    Narrative:     SPECIMEN IS A RECOLLECT   ESTIMATED GFR    Narrative:     SPECIMEN IS A RECOLLECT   PERIPHERAL SMEAR REVIEW   PLATELET ESTIMATE   MORPHOLOGY   DIFFERENTIAL COMMENT       All labs reviewed by me.    REASSESSMENT    2:07 AM - Patient seen and examined at bedside. Discussed plan of care, including lab work. Patient agrees to the plan of care. The patient will be medicated with Zofran. Ordered for labs to evaluate her symptoms.     PPE Note: I verified that the patient was wearing a mask and I was wearing appropriate PPE every time I entered the room. The patient's mask was on the patient at all times during my encounter except for a brief view of the oropharynx.     Scribe remained outside the patient's room and did not have any  contact with the patient for the duration of patient encounter.     COURSE & MEDICAL DECISION MAKING  Nursing notes, VS, PMSFHx reviewed in chart.    Patient is a 59-year-old female who comes in for evaluation of nausea.  Differential diagnosis includes alcohol intoxication, gastritis, dehydration, hyponatremia, hyperkalemia.  Diagnostic work-up includes labs.    Patient's initial vitals notable for slight tachycardia.  Patient is treated with Zofran after which she feels greatly improved.  Labs returned and appear to be at patient's chronic baseline.  She is chronically anemic, and has LFTs which are trending downward from prior.  Her lipase is mildly elevated just outside the normal range at 88, exam is not consistent with pancreatitis.  Alcohol level returns and is 12.5.  Of note labs were delayed as she was difficult to obtain IV access and.  After Zofran patient is feeling greatly improved.  As labs are unremarkable we did a p.o. challenge and patient was able to tolerate oral intake without difficulty.  Therefore she is reassured and again advised on alcohol cessation.  She is provided with prescription for Zofran and given strict return precautions.  Patient is then discharged in stable condition.      FINAL IMPRESSION  1. Nausea    2. Alcohol abuse          Mady GNA (Scribe), am scribing for, and in the presence of, Irena Flores M.D..    Electronically signed by: Mady Singh ([place]), 8/31/2020    Irena GAN M.D. personally performed the services described in this documentation, as scribed by Mady Singh in my presence, and it is both accurate and complete. C      The note accurately reflects work and decisions made by me.  Irena Flores M.D.  8/31/2020  4:36 AM

## 2020-08-31 NOTE — ED NOTES
"Pt to [facility], denying ETOH consumption tonight besides \"two beers.\" VSS. Pt holding LLQ abdomen reporting pain. Pt states she was recently discharged \"they told me to hydrate but he drank my water so I didn't have any.\" PIV started, labs collected and sent. ERP at bedside.   "

## 2020-09-01 LAB — OVA AND PARASITE, FECAL INTERPRETATION Q0595: NEGATIVE

## 2020-09-08 ENCOUNTER — HOSPITAL ENCOUNTER (EMERGENCY)
Dept: HOSPITAL 8 - ED | Age: 60
Discharge: HOME | End: 2020-09-08
Payer: MEDICAID

## 2020-09-08 VITALS — BODY MASS INDEX: 25.47 KG/M2 | HEIGHT: 69 IN | WEIGHT: 171.96 LBS

## 2020-09-08 VITALS — SYSTOLIC BLOOD PRESSURE: 108 MMHG | DIASTOLIC BLOOD PRESSURE: 69 MMHG

## 2020-09-08 DIAGNOSIS — S00.81XA: Primary | ICD-10-CM
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
## 2020-09-08 DIAGNOSIS — F10.120: ICD-10-CM
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
## 2020-09-08 DIAGNOSIS — Z72.9: ICD-10-CM
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
## 2020-09-08 DIAGNOSIS — S09.90XA: ICD-10-CM

## 2020-09-08 PROCEDURE — 99284 EMERGENCY DEPT VISIT MOD MDM: CPT

## 2020-09-08 PROCEDURE — 70450 CT HEAD/BRAIN W/O DYE: CPT

## 2020-09-09 ENCOUNTER — HOSPITAL ENCOUNTER (EMERGENCY)
Dept: HOSPITAL 8 - ED | Age: 60
Discharge: HOME | End: 2020-09-09
Payer: MEDICAID

## 2020-09-09 VITALS — SYSTOLIC BLOOD PRESSURE: 130 MMHG | DIASTOLIC BLOOD PRESSURE: 78 MMHG

## 2020-09-09 VITALS — HEIGHT: 67 IN | WEIGHT: 147.71 LBS | BODY MASS INDEX: 23.18 KG/M2

## 2020-09-09 DIAGNOSIS — F10.220: Primary | ICD-10-CM
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
## 2020-09-09 DIAGNOSIS — I10: ICD-10-CM

## 2020-09-09 DIAGNOSIS — K21.9: ICD-10-CM

## 2020-09-09 PROCEDURE — 99283 EMERGENCY DEPT VISIT LOW MDM: CPT

## 2020-09-11 ENCOUNTER — APPOINTMENT (OUTPATIENT)
Dept: RADIOLOGY | Facility: MEDICAL CENTER | Age: 60
End: 2020-09-11
Attending: EMERGENCY MEDICINE
Payer: MEDICAID

## 2020-09-11 ENCOUNTER — HOSPITAL ENCOUNTER (EMERGENCY)
Facility: MEDICAL CENTER | Age: 60
End: 2020-09-11
Attending: EMERGENCY MEDICINE
Payer: MEDICAID

## 2020-09-11 VITALS
DIASTOLIC BLOOD PRESSURE: 58 MMHG | TEMPERATURE: 98.2 F | BODY MASS INDEX: 24.75 KG/M2 | HEIGHT: 64 IN | SYSTOLIC BLOOD PRESSURE: 92 MMHG | RESPIRATION RATE: 14 BRPM | WEIGHT: 145 LBS | HEART RATE: 86 BPM | OXYGEN SATURATION: 96 %

## 2020-09-11 DIAGNOSIS — F10.920 ALCOHOLIC INTOXICATION WITHOUT COMPLICATION (HCC): ICD-10-CM

## 2020-09-11 DIAGNOSIS — S09.90XA CLOSED HEAD INJURY, INITIAL ENCOUNTER: ICD-10-CM

## 2020-09-11 PROCEDURE — 70450 CT HEAD/BRAIN W/O DYE: CPT

## 2020-09-11 PROCEDURE — 99284 EMERGENCY DEPT VISIT MOD MDM: CPT | Mod: 25

## 2020-09-11 ASSESSMENT — FIBROSIS 4 INDEX: FIB4 SCORE: 2.8
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
## 2020-09-11 NOTE — ED NOTES
Discharge education provided. Patient verbalizes understanding. Patient A/Ox4 and ambulatory to lobby. Patient has a hospital provided bus pass to take to the shelter.
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
## 2020-09-11 NOTE — ED NOTES
Patient sleeping. Chest rise and fall observed. Patient weaned off of oxygen to room air.  in use.

## 2020-09-12 ENCOUNTER — HOSPITAL ENCOUNTER (EMERGENCY)
Dept: HOSPITAL 8 - ED | Age: 60
LOS: 1 days | Discharge: HOME | End: 2020-09-13
Payer: MEDICAID

## 2020-09-12 VITALS — WEIGHT: 198.42 LBS | HEIGHT: 68 IN | BODY MASS INDEX: 30.07 KG/M2

## 2020-09-12 DIAGNOSIS — F10.120: Primary | ICD-10-CM

## 2020-09-12 DIAGNOSIS — Z72.9: ICD-10-CM

## 2020-09-12 DIAGNOSIS — I10: ICD-10-CM

## 2020-09-12 DIAGNOSIS — K21.9: ICD-10-CM
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
## 2020-09-12 DIAGNOSIS — E11.9: ICD-10-CM

## 2020-09-12 LAB
ALBUMIN SERPL-MCNC: 2.8 G/DL (ref 3.4–5)
ALP SERPL-CCNC: 190 U/L (ref 45–117)
ALT SERPL-CCNC: 42 U/L (ref 12–78)
ANION GAP SERPL CALC-SCNC: 13 MMOL/L (ref 5–15)
BASOPHILS # BLD AUTO: 0.04 X10^3/UL (ref 0–0.1)
BASOPHILS NFR BLD AUTO: 1 % (ref 0–1)
BILIRUB SERPL-MCNC: 0.4 MG/DL (ref 0.2–1)
CALCIUM SERPL-MCNC: 9.1 MG/DL (ref 8.5–10.1)
CHLORIDE SERPL-SCNC: 104 MMOL/L (ref 98–107)
CREAT SERPL-MCNC: 0.9 MG/DL (ref 0.55–1.02)
EOSINOPHIL # BLD AUTO: 0.09 X10^3/UL (ref 0–0.4)
EOSINOPHIL NFR BLD AUTO: 2 % (ref 1–7)
ERYTHROCYTE [DISTWIDTH] IN BLOOD BY AUTOMATED COUNT: 21.8 % (ref 9.6–15.2)
LYMPHOCYTES # BLD AUTO: 1.85 X10^3/UL (ref 1–3.4)
LYMPHOCYTES NFR BLD AUTO: 33 % (ref 22–44)
MCH RBC QN AUTO: 33.6 PG (ref 27–34.8)
MCHC RBC AUTO-ENTMCNC: 32.4 G/DL (ref 32.4–35.8)
MCV RBC AUTO: 103.9 FL (ref 80–100)
MD: NO
MICROSCOPIC: (no result)
MONOCYTES # BLD AUTO: 0.48 X10^3/UL (ref 0.2–0.8)
MONOCYTES NFR BLD AUTO: 9 % (ref 2–9)
NEUTROPHILS # BLD AUTO: 3.15 X10^3/UL (ref 1.8–6.8)
NEUTROPHILS NFR BLD AUTO: 56 % (ref 42–75)
PLATELET # BLD AUTO: 363 X10^3/UL (ref 130–400)
PMV BLD AUTO: 7.2 FL (ref 7.4–10.4)
PROT SERPL-MCNC: 6.9 G/DL (ref 6.4–8.2)
RBC # BLD AUTO: 3.36 X10^6/UL (ref 3.82–5.3)
VANCOMYCIN TROUGH SERPL-MCNC: 2.2 MG/DL (ref 2.8–20)

## 2020-09-12 PROCEDURE — 80307 DRUG TEST PRSMV CHEM ANLYZR: CPT

## 2020-09-12 PROCEDURE — 87086 URINE CULTURE/COLONY COUNT: CPT

## 2020-09-12 PROCEDURE — 80053 COMPREHEN METABOLIC PANEL: CPT

## 2020-09-12 PROCEDURE — 84703 CHORIONIC GONADOTROPIN ASSAY: CPT

## 2020-09-12 PROCEDURE — 36415 COLL VENOUS BLD VENIPUNCTURE: CPT

## 2020-09-12 PROCEDURE — 81001 URINALYSIS AUTO W/SCOPE: CPT

## 2020-09-12 PROCEDURE — 84443 ASSAY THYROID STIM HORMONE: CPT

## 2020-09-12 PROCEDURE — 85025 COMPLETE CBC W/AUTO DIFF WBC: CPT

## 2020-09-12 PROCEDURE — 99283 EMERGENCY DEPT VISIT LOW MDM: CPT

## 2020-09-13 VITALS — SYSTOLIC BLOOD PRESSURE: 103 MMHG | DIASTOLIC BLOOD PRESSURE: 61 MMHG

## 2020-09-15 ENCOUNTER — HOSPITAL ENCOUNTER (EMERGENCY)
Dept: HOSPITAL 8 - ED | Age: 60
Discharge: HOME | End: 2020-09-15
Payer: MEDICAID

## 2020-09-15 VITALS — SYSTOLIC BLOOD PRESSURE: 114 MMHG | DIASTOLIC BLOOD PRESSURE: 76 MMHG

## 2020-09-15 DIAGNOSIS — I10: ICD-10-CM

## 2020-09-15 DIAGNOSIS — K21.9: ICD-10-CM

## 2020-09-15 DIAGNOSIS — F10.120: Primary | ICD-10-CM

## 2020-09-15 DIAGNOSIS — E11.9: ICD-10-CM
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
## 2020-09-15 DIAGNOSIS — F17.210: ICD-10-CM

## 2020-09-15 PROCEDURE — 99406 BEHAV CHNG SMOKING 3-10 MIN: CPT

## 2020-09-16 ENCOUNTER — HOSPITAL ENCOUNTER (EMERGENCY)
Dept: HOSPITAL 8 - ED | Age: 60
Discharge: HOME | End: 2020-09-16
Payer: MEDICAID

## 2020-09-16 VITALS — HEIGHT: 67 IN | WEIGHT: 198.42 LBS | BODY MASS INDEX: 31.14 KG/M2

## 2020-09-16 VITALS — SYSTOLIC BLOOD PRESSURE: 114 MMHG | DIASTOLIC BLOOD PRESSURE: 74 MMHG

## 2020-09-16 DIAGNOSIS — E11.9: ICD-10-CM
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
## 2020-09-16 DIAGNOSIS — I10: ICD-10-CM

## 2020-09-16 DIAGNOSIS — F10.220: Primary | ICD-10-CM

## 2020-09-16 PROCEDURE — 99283 EMERGENCY DEPT VISIT LOW MDM: CPT

## 2020-09-17 ENCOUNTER — HOSPITAL ENCOUNTER (EMERGENCY)
Facility: MEDICAL CENTER | Age: 60
End: 2020-09-18
Attending: EMERGENCY MEDICINE | Admitting: EMERGENCY MEDICINE
Payer: MEDICAID

## 2020-09-17 ENCOUNTER — HOSPITAL ENCOUNTER (EMERGENCY)
Dept: HOSPITAL 8 - ED | Age: 60
Discharge: LEFT BEFORE DISCHARGE | End: 2020-09-17
Payer: MEDICAID

## 2020-09-17 VITALS — BODY MASS INDEX: 27.18 KG/M2 | HEIGHT: 65 IN | WEIGHT: 163.14 LBS

## 2020-09-17 VITALS
RESPIRATION RATE: 20 BRPM | SYSTOLIC BLOOD PRESSURE: 125 MMHG | DIASTOLIC BLOOD PRESSURE: 85 MMHG | WEIGHT: 145 LBS | BODY MASS INDEX: 24.89 KG/M2 | TEMPERATURE: 97.7 F | OXYGEN SATURATION: 94 % | HEART RATE: 89 BPM

## 2020-09-17 VITALS — SYSTOLIC BLOOD PRESSURE: 110 MMHG | DIASTOLIC BLOOD PRESSURE: 62 MMHG

## 2020-09-17 DIAGNOSIS — Z72.9: ICD-10-CM
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
## 2020-09-17 DIAGNOSIS — F10.10 CHRONIC ALCOHOL ABUSE: ICD-10-CM

## 2020-09-17 DIAGNOSIS — E11.9: ICD-10-CM

## 2020-09-17 DIAGNOSIS — I10: ICD-10-CM

## 2020-09-17 DIAGNOSIS — F10.220: Primary | ICD-10-CM

## 2020-09-17 DIAGNOSIS — K21.9: ICD-10-CM

## 2020-09-17 DIAGNOSIS — S80.211A ABRASION, RIGHT KNEE, INITIAL ENCOUNTER: ICD-10-CM

## 2020-09-17 DIAGNOSIS — F10.920 ACUTE ALCOHOLIC INTOXICATION WITHOUT COMPLICATION (HCC): ICD-10-CM
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
## 2020-09-17 LAB — POC BREATHALIZER: 0.35 PERCENT (ref 0–0.01)

## 2020-09-17 PROCEDURE — 99283 EMERGENCY DEPT VISIT LOW MDM: CPT

## 2020-09-17 PROCEDURE — A9270 NON-COVERED ITEM OR SERVICE: HCPCS | Performed by: EMERGENCY MEDICINE

## 2020-09-17 PROCEDURE — 700102 HCHG RX REV CODE 250 W/ 637 OVERRIDE(OP): Performed by: EMERGENCY MEDICINE

## 2020-09-17 PROCEDURE — 302970 POC BREATHALIZER: Performed by: EMERGENCY MEDICINE

## 2020-09-17 PROCEDURE — 99284 EMERGENCY DEPT VISIT MOD MDM: CPT

## 2020-09-17 RX ORDER — ACETAMINOPHEN 325 MG/1
650 TABLET ORAL ONCE
Status: COMPLETED | OUTPATIENT
Start: 2020-09-17 | End: 2020-09-17

## 2020-09-17 RX ADMIN — ACETAMINOPHEN 650 MG: 325 TABLET, FILM COATED ORAL at 17:45

## 2020-09-17 ASSESSMENT — FIBROSIS 4 INDEX: FIB4 SCORE: 2.8

## 2020-09-17 ASSESSMENT — LIFESTYLE VARIABLES
DO YOU DRINK ALCOHOL: YES
DOES PATIENT WANT TO STOP DRINKING: NO

## 2020-09-17 NOTE — ED TRIAGE NOTES
Chief Complaint   Patient presents with   • Alcohol Intoxication     pt seen here multiple times for same

## 2020-09-18 NOTE — ED NOTES
Pt appears to be sleeping in bed w/ even chest rise and fall. No needs at this time. Will continue to monitor.

## 2020-09-18 NOTE — ED NOTES
Received report on patient. Patient sleeping in bed. No needs at this time. Will continue to monitor.

## 2020-09-18 NOTE — DISCHARGE INSTRUCTIONS
You need to stop drinking alcohol or you are going to end up killing yourself.  Keep your knee clean with antibacterial soap and water.
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
## 2020-09-18 NOTE — ED NOTES
Pt discharged. Pt provided information about alcohol abuse and abrasion care. Pt educated to follow-up w/ PCP and to return to the hospital w/ any worsening symptoms. Pt walked to the lobby.

## 2020-09-19 ENCOUNTER — HOSPITAL ENCOUNTER (EMERGENCY)
Dept: HOSPITAL 8 - ED | Age: 60
Discharge: HOME | End: 2020-09-19
Payer: MEDICAID

## 2020-09-19 VITALS — HEIGHT: 64 IN | WEIGHT: 165.35 LBS | BODY MASS INDEX: 28.23 KG/M2

## 2020-09-19 VITALS — DIASTOLIC BLOOD PRESSURE: 84 MMHG | SYSTOLIC BLOOD PRESSURE: 128 MMHG

## 2020-09-19 DIAGNOSIS — F10.220: ICD-10-CM
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
## 2020-09-19 DIAGNOSIS — S00.83XA: Primary | ICD-10-CM

## 2020-09-19 DIAGNOSIS — Z72.9: ICD-10-CM
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
## 2020-09-19 DIAGNOSIS — K21.9: ICD-10-CM

## 2020-09-19 DIAGNOSIS — I10: ICD-10-CM

## 2020-09-19 DIAGNOSIS — E11.9: ICD-10-CM

## 2020-09-19 DIAGNOSIS — F17.210: ICD-10-CM

## 2020-09-19 PROCEDURE — 70450 CT HEAD/BRAIN W/O DYE: CPT

## 2020-09-19 PROCEDURE — 99406 BEHAV CHNG SMOKING 3-10 MIN: CPT

## 2020-09-19 PROCEDURE — 99284 EMERGENCY DEPT VISIT MOD MDM: CPT

## 2020-09-20 ENCOUNTER — HOSPITAL ENCOUNTER (EMERGENCY)
Dept: HOSPITAL 8 - ED | Age: 60
Discharge: HOME | End: 2020-09-20
Payer: MEDICAID

## 2020-09-20 VITALS — WEIGHT: 171.96 LBS | BODY MASS INDEX: 26.06 KG/M2 | HEIGHT: 68 IN

## 2020-09-20 VITALS — SYSTOLIC BLOOD PRESSURE: 126 MMHG | DIASTOLIC BLOOD PRESSURE: 82 MMHG

## 2020-09-20 DIAGNOSIS — S96.911A: Primary | ICD-10-CM

## 2020-09-20 DIAGNOSIS — F10.220: ICD-10-CM
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
## 2020-09-20 DIAGNOSIS — K21.9: ICD-10-CM

## 2020-09-20 DIAGNOSIS — E11.9: ICD-10-CM
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
## 2020-09-20 DIAGNOSIS — I10: ICD-10-CM
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
## 2020-09-20 PROCEDURE — 99283 EMERGENCY DEPT VISIT LOW MDM: CPT

## 2020-09-23 ENCOUNTER — HOSPITAL ENCOUNTER (INPATIENT)
Dept: HOSPITAL 8 - ED | Age: 60
LOS: 4 days | Discharge: HOME | End: 2020-09-27
Attending: INTERNAL MEDICINE | Admitting: INTERNAL MEDICINE
Payer: MEDICAID

## 2020-09-23 VITALS — BODY MASS INDEX: 30.55 KG/M2 | WEIGHT: 166.01 LBS | HEIGHT: 62 IN

## 2020-09-23 VITALS — DIASTOLIC BLOOD PRESSURE: 87 MMHG | SYSTOLIC BLOOD PRESSURE: 146 MMHG

## 2020-09-23 VITALS — SYSTOLIC BLOOD PRESSURE: 147 MMHG | DIASTOLIC BLOOD PRESSURE: 86 MMHG

## 2020-09-23 VITALS — DIASTOLIC BLOOD PRESSURE: 83 MMHG | SYSTOLIC BLOOD PRESSURE: 141 MMHG

## 2020-09-23 DIAGNOSIS — R56.9: ICD-10-CM

## 2020-09-23 DIAGNOSIS — E83.39: ICD-10-CM

## 2020-09-23 DIAGNOSIS — I10: ICD-10-CM

## 2020-09-23 DIAGNOSIS — F10.239: Primary | ICD-10-CM

## 2020-09-23 DIAGNOSIS — K29.20: ICD-10-CM

## 2020-09-23 DIAGNOSIS — F32.9: ICD-10-CM

## 2020-09-23 DIAGNOSIS — E83.42: ICD-10-CM

## 2020-09-23 DIAGNOSIS — F43.10: ICD-10-CM

## 2020-09-23 DIAGNOSIS — Z59.0: ICD-10-CM

## 2020-09-23 DIAGNOSIS — K29.00: ICD-10-CM

## 2020-09-23 DIAGNOSIS — R19.7: ICD-10-CM
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
## 2020-09-23 DIAGNOSIS — K76.0: ICD-10-CM

## 2020-09-23 DIAGNOSIS — E87.6: ICD-10-CM

## 2020-09-23 DIAGNOSIS — D64.9: ICD-10-CM

## 2020-09-23 DIAGNOSIS — Z87.01: ICD-10-CM

## 2020-09-23 DIAGNOSIS — K21.9: ICD-10-CM

## 2020-09-23 DIAGNOSIS — F41.1: ICD-10-CM

## 2020-09-23 DIAGNOSIS — F10.229: ICD-10-CM

## 2020-09-23 DIAGNOSIS — D75.89: ICD-10-CM

## 2020-09-23 DIAGNOSIS — D69.59: ICD-10-CM

## 2020-09-23 DIAGNOSIS — Z88.1: ICD-10-CM

## 2020-09-23 DIAGNOSIS — Z91.5: ICD-10-CM

## 2020-09-23 DIAGNOSIS — I48.91: ICD-10-CM

## 2020-09-23 DIAGNOSIS — Z88.2: ICD-10-CM

## 2020-09-23 DIAGNOSIS — K70.10: ICD-10-CM

## 2020-09-23 DIAGNOSIS — Z88.8: ICD-10-CM

## 2020-09-23 DIAGNOSIS — E11.649: ICD-10-CM

## 2020-09-23 DIAGNOSIS — Z87.442: ICD-10-CM

## 2020-09-23 LAB
ALBUMIN SERPL-MCNC: 3 G/DL (ref 3.4–5)
ALP SERPL-CCNC: 176 U/L (ref 45–117)
ALT SERPL-CCNC: 44 U/L (ref 12–78)
ANION GAP SERPL CALC-SCNC: 9 MMOL/L (ref 5–15)
BASOPHILS # BLD AUTO: 0.02 X10^3/UL (ref 0–0.1)
BASOPHILS NFR BLD AUTO: 0 % (ref 0–1)
BILIRUB SERPL-MCNC: 1.7 MG/DL (ref 0.2–1)
CALCIUM SERPL-MCNC: 8.3 MG/DL (ref 8.5–10.1)
CHLORIDE SERPL-SCNC: 108 MMOL/L (ref 98–107)
CLOSTRIDIUM DIFFICILE ANTIGEN: POSITIVE
CLOSTRIDIUM DIFFICILE TOXIN: NEGATIVE
CREAT SERPL-MCNC: 0.67 MG/DL (ref 0.55–1.02)
EOSINOPHIL # BLD AUTO: 0 X10^3/UL (ref 0–0.4)
EOSINOPHIL NFR BLD AUTO: 0 % (ref 1–7)
ERYTHROCYTE [DISTWIDTH] IN BLOOD BY AUTOMATED COUNT: 21.9 % (ref 9.6–15.2)
LYMPHOCYTES # BLD AUTO: 0.47 X10^3/UL (ref 1–3.4)
LYMPHOCYTES NFR BLD AUTO: 7 % (ref 22–44)
MCH RBC QN AUTO: 34.1 PG (ref 27–34.8)
MCHC RBC AUTO-ENTMCNC: 32.9 G/DL (ref 32.4–35.8)
MD: NO
MONOCYTES # BLD AUTO: 0.52 X10^3/UL (ref 0.2–0.8)
MONOCYTES NFR BLD AUTO: 8 % (ref 2–9)
NEUTROPHILS # BLD AUTO: 5.79 X10^3/UL (ref 1.8–6.8)
NEUTROPHILS NFR BLD AUTO: 85 % (ref 42–75)
PLATELET # BLD AUTO: 100 X10^3/UL (ref 130–400)
PMV BLD AUTO: 7.8 FL (ref 7.4–10.4)
PROT SERPL-MCNC: 7.3 G/DL (ref 6.4–8.2)
RBC # BLD AUTO: 3.44 X10^6/UL (ref 3.82–5.3)

## 2020-09-23 PROCEDURE — 82962 GLUCOSE BLOOD TEST: CPT

## 2020-09-23 PROCEDURE — 93005 ELECTROCARDIOGRAM TRACING: CPT

## 2020-09-23 PROCEDURE — 84100 ASSAY OF PHOSPHORUS: CPT

## 2020-09-23 PROCEDURE — 76700 US EXAM ABDOM COMPLETE: CPT

## 2020-09-23 PROCEDURE — 82150 ASSAY OF AMYLASE: CPT

## 2020-09-23 PROCEDURE — 84703 CHORIONIC GONADOTROPIN ASSAY: CPT

## 2020-09-23 PROCEDURE — 87324 CLOSTRIDIUM AG IA: CPT

## 2020-09-23 PROCEDURE — 87493 C DIFF AMPLIFIED PROBE: CPT

## 2020-09-23 PROCEDURE — 84443 ASSAY THYROID STIM HORMONE: CPT

## 2020-09-23 PROCEDURE — 83735 ASSAY OF MAGNESIUM: CPT

## 2020-09-23 PROCEDURE — 96374 THER/PROPH/DIAG INJ IV PUSH: CPT

## 2020-09-23 PROCEDURE — 85025 COMPLETE CBC W/AUTO DIFF WBC: CPT

## 2020-09-23 PROCEDURE — 83690 ASSAY OF LIPASE: CPT

## 2020-09-23 PROCEDURE — 36415 COLL VENOUS BLD VENIPUNCTURE: CPT

## 2020-09-23 PROCEDURE — 80076 HEPATIC FUNCTION PANEL: CPT

## 2020-09-23 PROCEDURE — 80053 COMPREHEN METABOLIC PANEL: CPT

## 2020-09-23 PROCEDURE — C9113 INJ PANTOPRAZOLE SODIUM, VIA: HCPCS

## 2020-09-23 PROCEDURE — 80048 BASIC METABOLIC PNL TOTAL CA: CPT

## 2020-09-23 PROCEDURE — 96375 TX/PRO/DX INJ NEW DRUG ADDON: CPT

## 2020-09-23 RX ADMIN — LORAZEPAM PRN MG: 2 INJECTION INTRAMUSCULAR; INTRAVENOUS at 06:30

## 2020-09-23 RX ADMIN — DEXTROSE, SODIUM CHLORIDE, AND POTASSIUM CHLORIDE SCH MLS/HR: 5; .45; .15 INJECTION INTRAVENOUS at 12:54

## 2020-09-23 RX ADMIN — LACTOBACILLUS TAB SCH TAB: TAB at 15:05

## 2020-09-23 RX ADMIN — CHLORDIAZEPOXIDE HYDROCHLORIDE SCH MG: 25 CAPSULE ORAL at 14:33

## 2020-09-23 RX ADMIN — LORAZEPAM PRN MG: 2 INJECTION INTRAMUSCULAR; INTRAVENOUS at 06:02

## 2020-09-23 RX ADMIN — LACTOBACILLUS TAB SCH TAB: TAB at 21:37

## 2020-09-23 RX ADMIN — NICOTINE SCH PATCH: 14 PATCH, EXTENDED RELEASE TRANSDERMAL at 18:00

## 2020-09-23 RX ADMIN — DEXTROSE, SODIUM CHLORIDE, AND POTASSIUM CHLORIDE SCH MLS/HR: 5; .45; .15 INJECTION INTRAVENOUS at 23:06

## 2020-09-23 RX ADMIN — CHLORDIAZEPOXIDE HYDROCHLORIDE SCH MG: 25 CAPSULE ORAL at 15:06

## 2020-09-23 RX ADMIN — SODIUM CHLORIDE, PRESERVATIVE FREE SCH ML: 5 INJECTION INTRAVENOUS at 10:44

## 2020-09-23 RX ADMIN — THIAMINE HYDROCHLORIDE SCH MLS/HR: 100 INJECTION, SOLUTION INTRAMUSCULAR; INTRAVENOUS at 11:43

## 2020-09-23 RX ADMIN — CHLORDIAZEPOXIDE HYDROCHLORIDE SCH MG: 25 CAPSULE ORAL at 21:37

## 2020-09-23 RX ADMIN — SODIUM CHLORIDE, PRESERVATIVE FREE SCH ML: 5 INJECTION INTRAVENOUS at 21:00

## 2020-09-23 RX ADMIN — LORAZEPAM PRN MG: 2 INJECTION INTRAMUSCULAR; INTRAVENOUS at 06:17

## 2020-09-23 RX ADMIN — VANCOMYCIN HYDROCHLORIDE SCH MG: 1 INJECTION, POWDER, LYOPHILIZED, FOR SOLUTION INTRAVENOUS at 18:01

## 2020-09-23 RX ADMIN — CHLORDIAZEPOXIDE HYDROCHLORIDE SCH MG: 25 CAPSULE ORAL at 10:44

## 2020-09-23 RX ADMIN — ENOXAPARIN SODIUM SCH MG: 40 INJECTION SUBCUTANEOUS at 10:45

## 2020-09-23 RX ADMIN — VANCOMYCIN HYDROCHLORIDE SCH MG: 1 INJECTION, POWDER, LYOPHILIZED, FOR SOLUTION INTRAVENOUS at 13:12

## 2020-09-23 RX ADMIN — LORAZEPAM PRN MG: 2 INJECTION INTRAMUSCULAR; INTRAVENOUS at 06:41

## 2020-09-23 SDOH — ECONOMIC STABILITY - HOUSING INSECURITY: HOMELESSNESS: Z59.0

## 2020-09-24 VITALS — SYSTOLIC BLOOD PRESSURE: 140 MMHG | DIASTOLIC BLOOD PRESSURE: 80 MMHG

## 2020-09-24 VITALS — DIASTOLIC BLOOD PRESSURE: 85 MMHG | SYSTOLIC BLOOD PRESSURE: 138 MMHG

## 2020-09-24 VITALS — SYSTOLIC BLOOD PRESSURE: 144 MMHG | DIASTOLIC BLOOD PRESSURE: 89 MMHG

## 2020-09-24 VITALS — SYSTOLIC BLOOD PRESSURE: 134 MMHG | DIASTOLIC BLOOD PRESSURE: 78 MMHG

## 2020-09-24 LAB
ALBUMIN SERPL-MCNC: 2.6 G/DL (ref 3.4–5)
ALP SERPL-CCNC: 147 U/L (ref 45–117)
ALT SERPL-CCNC: 35 U/L (ref 12–78)
ANION GAP SERPL CALC-SCNC: 7 MMOL/L (ref 5–15)
BASOPHILS # BLD AUTO: 0.01 X10^3/UL (ref 0–0.1)
BASOPHILS NFR BLD AUTO: 0 % (ref 0–1)
BILIRUB SERPL-MCNC: 1 MG/DL (ref 0.2–1)
CALCIUM SERPL-MCNC: 7.9 MG/DL (ref 8.5–10.1)
CHLORIDE SERPL-SCNC: 108 MMOL/L (ref 98–107)
CREAT SERPL-MCNC: 0.64 MG/DL (ref 0.55–1.02)
EOSINOPHIL # BLD AUTO: 0.13 X10^3/UL (ref 0–0.4)
EOSINOPHIL NFR BLD AUTO: 3 % (ref 1–7)
ERYTHROCYTE [DISTWIDTH] IN BLOOD BY AUTOMATED COUNT: 21.4 % (ref 9.6–15.2)
LYMPHOCYTES # BLD AUTO: 1.02 X10^3/UL (ref 1–3.4)
LYMPHOCYTES NFR BLD AUTO: 26 % (ref 22–44)
MCH RBC QN AUTO: 33.8 PG (ref 27–34.8)
MCHC RBC AUTO-ENTMCNC: 32.6 G/DL (ref 32.4–35.8)
MD: (no result)
MONOCYTES # BLD AUTO: 0.5 X10^3/UL (ref 0.2–0.8)
MONOCYTES NFR BLD AUTO: 13 % (ref 2–9)
NEUTROPHILS # BLD AUTO: 2.3 X10^3/UL (ref 1.8–6.8)
NEUTROPHILS NFR BLD AUTO: 58 % (ref 42–75)
PLATELET # BLD AUTO: 93 X10^3/UL (ref 130–400)
PMV BLD AUTO: 7.9 FL (ref 7.4–10.4)
PROT SERPL-MCNC: 6.5 G/DL (ref 6.4–8.2)
RBC # BLD AUTO: 3.24 X10^6/UL (ref 3.82–5.3)

## 2020-09-24 RX ADMIN — ENOXAPARIN SODIUM SCH MG: 40 INJECTION SUBCUTANEOUS at 09:41

## 2020-09-24 RX ADMIN — LACTOBACILLUS TAB SCH TAB: TAB at 16:02

## 2020-09-24 RX ADMIN — VANCOMYCIN HYDROCHLORIDE SCH MG: 1 INJECTION, POWDER, LYOPHILIZED, FOR SOLUTION INTRAVENOUS at 13:38

## 2020-09-24 RX ADMIN — LACTOBACILLUS TAB SCH TAB: TAB at 20:54

## 2020-09-24 RX ADMIN — POTASSIUM CHLORIDE SCH MEQ: 20 TABLET, EXTENDED RELEASE ORAL at 06:39

## 2020-09-24 RX ADMIN — POTASSIUM CHLORIDE SCH MEQ: 20 TABLET, EXTENDED RELEASE ORAL at 13:38

## 2020-09-24 RX ADMIN — POTASSIUM CHLORIDE SCH MEQ: 20 TABLET, EXTENDED RELEASE ORAL at 17:25

## 2020-09-24 RX ADMIN — POTASSIUM PHOSPHATE, MONOBASIC SCH MG: 500 TABLET, SOLUBLE ORAL at 13:38

## 2020-09-24 RX ADMIN — SODIUM CHLORIDE, PRESERVATIVE FREE SCH ML: 5 INJECTION INTRAVENOUS at 09:00

## 2020-09-24 RX ADMIN — CHLORDIAZEPOXIDE HYDROCHLORIDE SCH MG: 25 CAPSULE ORAL at 20:54

## 2020-09-24 RX ADMIN — DEXTROSE, SODIUM CHLORIDE, AND POTASSIUM CHLORIDE SCH MLS/HR: 5; .45; .15 INJECTION INTRAVENOUS at 17:27

## 2020-09-24 RX ADMIN — ONDANSETRON PRN MG: 2 INJECTION, SOLUTION INTRAMUSCULAR; INTRAVENOUS at 04:41

## 2020-09-24 RX ADMIN — VANCOMYCIN HYDROCHLORIDE SCH MG: 1 INJECTION, POWDER, LYOPHILIZED, FOR SOLUTION INTRAVENOUS at 06:38

## 2020-09-24 RX ADMIN — CHLORDIAZEPOXIDE HYDROCHLORIDE SCH MG: 25 CAPSULE ORAL at 11:56

## 2020-09-24 RX ADMIN — CHLORDIAZEPOXIDE HYDROCHLORIDE SCH MG: 25 CAPSULE ORAL at 16:02

## 2020-09-24 RX ADMIN — ACETAMINOPHEN PRN MG: 325 TABLET, FILM COATED ORAL at 00:38

## 2020-09-24 RX ADMIN — ACETAMINOPHEN PRN MG: 325 TABLET, FILM COATED ORAL at 13:47

## 2020-09-24 RX ADMIN — THIAMINE HYDROCHLORIDE SCH MLS/HR: 100 INJECTION, SOLUTION INTRAMUSCULAR; INTRAVENOUS at 09:42

## 2020-09-24 RX ADMIN — CHLORDIAZEPOXIDE HYDROCHLORIDE SCH MG: 25 CAPSULE ORAL at 03:00

## 2020-09-24 RX ADMIN — NICOTINE SCH PATCH: 14 PATCH, EXTENDED RELEASE TRANSDERMAL at 17:25

## 2020-09-24 RX ADMIN — LACTOBACILLUS TAB SCH TAB: TAB at 08:57

## 2020-09-24 RX ADMIN — SODIUM CHLORIDE, PRESERVATIVE FREE SCH ML: 5 INJECTION INTRAVENOUS at 20:54

## 2020-09-24 RX ADMIN — POTASSIUM PHOSPHATE, MONOBASIC SCH MG: 500 TABLET, SOLUBLE ORAL at 17:25

## 2020-09-24 RX ADMIN — VANCOMYCIN HYDROCHLORIDE SCH MG: 1 INJECTION, POWDER, LYOPHILIZED, FOR SOLUTION INTRAVENOUS at 00:42

## 2020-09-24 RX ADMIN — POTASSIUM PHOSPHATE, MONOBASIC SCH MG: 500 TABLET, SOLUBLE ORAL at 06:38

## 2020-09-25 VITALS — SYSTOLIC BLOOD PRESSURE: 149 MMHG | DIASTOLIC BLOOD PRESSURE: 97 MMHG

## 2020-09-25 VITALS — DIASTOLIC BLOOD PRESSURE: 84 MMHG | SYSTOLIC BLOOD PRESSURE: 116 MMHG

## 2020-09-25 VITALS — DIASTOLIC BLOOD PRESSURE: 83 MMHG | SYSTOLIC BLOOD PRESSURE: 123 MMHG

## 2020-09-25 VITALS — SYSTOLIC BLOOD PRESSURE: 135 MMHG | DIASTOLIC BLOOD PRESSURE: 84 MMHG

## 2020-09-25 LAB
ANION GAP SERPL CALC-SCNC: 6 MMOL/L (ref 5–15)
CALCIUM SERPL-MCNC: 8.6 MG/DL (ref 8.5–10.1)
CHLORIDE SERPL-SCNC: 109 MMOL/L (ref 98–107)
CREAT SERPL-MCNC: 0.66 MG/DL (ref 0.55–1.02)

## 2020-09-25 RX ADMIN — CHLORDIAZEPOXIDE HYDROCHLORIDE SCH MG: 25 CAPSULE ORAL at 06:00

## 2020-09-25 RX ADMIN — LOPERAMIDE HYDROCHLORIDE PRN MG: 2 CAPSULE ORAL at 20:13

## 2020-09-25 RX ADMIN — POTASSIUM CHLORIDE SCH MEQ: 20 TABLET, EXTENDED RELEASE ORAL at 00:39

## 2020-09-25 RX ADMIN — THIAMINE HYDROCHLORIDE SCH MLS/HR: 100 INJECTION, SOLUTION INTRAMUSCULAR; INTRAVENOUS at 08:48

## 2020-09-25 RX ADMIN — POTASSIUM PHOSPHATE, MONOBASIC SCH MG: 500 TABLET, SOLUBLE ORAL at 20:05

## 2020-09-25 RX ADMIN — LACTOBACILLUS TAB SCH TAB: TAB at 16:16

## 2020-09-25 RX ADMIN — POTASSIUM PHOSPHATE, MONOBASIC SCH MG: 500 TABLET, SOLUBLE ORAL at 10:05

## 2020-09-25 RX ADMIN — SODIUM CHLORIDE, PRESERVATIVE FREE SCH ML: 5 INJECTION INTRAVENOUS at 09:00

## 2020-09-25 RX ADMIN — MAGNESIUM OXIDE SCH MG: 400 TABLET ORAL at 20:06

## 2020-09-25 RX ADMIN — LACTOBACILLUS TAB SCH TAB: TAB at 08:48

## 2020-09-25 RX ADMIN — ENOXAPARIN SODIUM SCH MG: 40 INJECTION SUBCUTANEOUS at 08:48

## 2020-09-25 RX ADMIN — NICOTINE SCH PATCH: 14 PATCH, EXTENDED RELEASE TRANSDERMAL at 16:16

## 2020-09-25 RX ADMIN — LACTOBACILLUS TAB SCH TAB: TAB at 20:05

## 2020-09-25 RX ADMIN — POTASSIUM PHOSPHATE, MONOBASIC SCH MG: 500 TABLET, SOLUBLE ORAL at 16:16

## 2020-09-25 RX ADMIN — DEXTROSE, SODIUM CHLORIDE, AND POTASSIUM CHLORIDE SCH MLS/HR: 5; .45; .15 INJECTION INTRAVENOUS at 03:31

## 2020-09-25 RX ADMIN — Medication SCH MG: at 20:06

## 2020-09-25 RX ADMIN — SODIUM CHLORIDE, PRESERVATIVE FREE SCH ML: 5 INJECTION INTRAVENOUS at 20:11

## 2020-09-25 RX ADMIN — POTASSIUM PHOSPHATE, MONOBASIC SCH MG: 500 TABLET, SOLUBLE ORAL at 00:39

## 2020-09-26 VITALS — DIASTOLIC BLOOD PRESSURE: 63 MMHG | SYSTOLIC BLOOD PRESSURE: 94 MMHG

## 2020-09-26 VITALS — SYSTOLIC BLOOD PRESSURE: 103 MMHG | DIASTOLIC BLOOD PRESSURE: 69 MMHG

## 2020-09-26 VITALS — DIASTOLIC BLOOD PRESSURE: 90 MMHG | SYSTOLIC BLOOD PRESSURE: 147 MMHG

## 2020-09-26 VITALS — DIASTOLIC BLOOD PRESSURE: 89 MMHG | SYSTOLIC BLOOD PRESSURE: 132 MMHG

## 2020-09-26 LAB
ALBUMIN SERPL-MCNC: 2.7 G/DL (ref 3.4–5)
ALP SERPL-CCNC: 139 U/L (ref 45–117)
ALT SERPL-CCNC: 30 U/L (ref 12–78)
ANION GAP SERPL CALC-SCNC: 8 MMOL/L (ref 5–15)
BILIRUB SERPL-MCNC: 0.4 MG/DL (ref 0.2–1)
CALCIUM SERPL-MCNC: 8.6 MG/DL (ref 8.5–10.1)
CHLORIDE SERPL-SCNC: 110 MMOL/L (ref 98–107)
CREAT SERPL-MCNC: 0.64 MG/DL (ref 0.55–1.02)
PROT SERPL-MCNC: 6.7 G/DL (ref 6.4–8.2)

## 2020-09-26 RX ADMIN — Medication SCH MG: at 21:31

## 2020-09-26 RX ADMIN — ACETAMINOPHEN PRN MG: 325 TABLET, FILM COATED ORAL at 08:39

## 2020-09-26 RX ADMIN — NICOTINE SCH PATCH: 14 PATCH, EXTENDED RELEASE TRANSDERMAL at 17:19

## 2020-09-26 RX ADMIN — SODIUM CHLORIDE, PRESERVATIVE FREE SCH ML: 5 INJECTION INTRAVENOUS at 21:35

## 2020-09-26 RX ADMIN — MAGNESIUM OXIDE SCH MG: 400 TABLET ORAL at 08:38

## 2020-09-26 RX ADMIN — Medication SCH MG: at 08:38

## 2020-09-26 RX ADMIN — Medication SCH TAB: at 08:38

## 2020-09-26 RX ADMIN — POTASSIUM PHOSPHATE, MONOBASIC SCH MG: 500 TABLET, SOLUBLE ORAL at 04:01

## 2020-09-26 RX ADMIN — DIPHENOXYLATE HYDROCHLORIDE AND ATROPINE SULFATE SCH TAB: 2.5; .025 TABLET ORAL at 21:35

## 2020-09-26 RX ADMIN — MAGNESIUM OXIDE SCH MG: 400 TABLET ORAL at 21:31

## 2020-09-26 RX ADMIN — SODIUM CHLORIDE, PRESERVATIVE FREE SCH ML: 5 INJECTION INTRAVENOUS at 09:00

## 2020-09-26 RX ADMIN — LACTOBACILLUS TAB SCH TAB: TAB at 17:19

## 2020-09-26 RX ADMIN — ENOXAPARIN SODIUM SCH MG: 40 INJECTION SUBCUTANEOUS at 08:39

## 2020-09-26 RX ADMIN — LOPERAMIDE HYDROCHLORIDE PRN MG: 2 CAPSULE ORAL at 08:53

## 2020-09-26 RX ADMIN — DIPHENOXYLATE HYDROCHLORIDE AND ATROPINE SULFATE SCH TAB: 2.5; .025 TABLET ORAL at 12:34

## 2020-09-26 RX ADMIN — DIPHENOXYLATE HYDROCHLORIDE AND ATROPINE SULFATE SCH TAB: 2.5; .025 TABLET ORAL at 17:19

## 2020-09-26 RX ADMIN — LACTOBACILLUS TAB SCH TAB: TAB at 08:38

## 2020-09-26 RX ADMIN — ACETAMINOPHEN PRN MG: 325 TABLET, FILM COATED ORAL at 21:31

## 2020-09-26 RX ADMIN — LOPERAMIDE HYDROCHLORIDE PRN MG: 2 CAPSULE ORAL at 04:49

## 2020-09-26 RX ADMIN — LACTOBACILLUS TAB SCH TAB: TAB at 21:30

## 2020-09-27 VITALS — DIASTOLIC BLOOD PRESSURE: 76 MMHG | SYSTOLIC BLOOD PRESSURE: 111 MMHG

## 2020-09-27 VITALS — DIASTOLIC BLOOD PRESSURE: 62 MMHG | SYSTOLIC BLOOD PRESSURE: 132 MMHG

## 2020-09-27 VITALS — SYSTOLIC BLOOD PRESSURE: 134 MMHG | DIASTOLIC BLOOD PRESSURE: 80 MMHG

## 2020-09-27 LAB
ALBUMIN SERPL-MCNC: 2.8 G/DL (ref 3.4–5)
ALP SERPL-CCNC: 149 U/L (ref 45–117)
ALT SERPL-CCNC: 38 U/L (ref 12–78)
ANION GAP SERPL CALC-SCNC: 5 MMOL/L (ref 5–15)
BASOPHILS # BLD AUTO: 0.02 X10^3/UL (ref 0–0.1)
BASOPHILS NFR BLD AUTO: 1 % (ref 0–1)
BILIRUB SERPL-MCNC: 0.3 MG/DL (ref 0.2–1)
CALCIUM SERPL-MCNC: 9.9 MG/DL (ref 8.5–10.1)
CHLORIDE SERPL-SCNC: 104 MMOL/L (ref 98–107)
CREAT SERPL-MCNC: 0.62 MG/DL (ref 0.55–1.02)
EOSINOPHIL # BLD AUTO: 0.13 X10^3/UL (ref 0–0.4)
EOSINOPHIL NFR BLD AUTO: 4 % (ref 1–7)
ERYTHROCYTE [DISTWIDTH] IN BLOOD BY AUTOMATED COUNT: 20.9 % (ref 9.6–15.2)
LYMPHOCYTES # BLD AUTO: 0.71 X10^3/UL (ref 1–3.4)
LYMPHOCYTES NFR BLD AUTO: 22 % (ref 22–44)
MCH RBC QN AUTO: 34.5 PG (ref 27–34.8)
MCHC RBC AUTO-ENTMCNC: 33.2 G/DL (ref 32.4–35.8)
MD: (no result)
MONOCYTES # BLD AUTO: 0.49 X10^3/UL (ref 0.2–0.8)
MONOCYTES NFR BLD AUTO: 15 % (ref 2–9)
NEUTROPHILS # BLD AUTO: 1.9 X10^3/UL (ref 1.8–6.8)
NEUTROPHILS NFR BLD AUTO: 58 % (ref 42–75)
PLATELET # BLD AUTO: 127 X10^3/UL (ref 130–400)
PMV BLD AUTO: 9 FL (ref 7.4–10.4)
PROT SERPL-MCNC: 6.9 G/DL (ref 6.4–8.2)
RBC # BLD AUTO: 3.22 X10^6/UL (ref 3.82–5.3)

## 2020-09-27 RX ADMIN — DIPHENOXYLATE HYDROCHLORIDE AND ATROPINE SULFATE SCH TAB: 2.5; .025 TABLET ORAL at 11:00

## 2020-09-27 RX ADMIN — Medication SCH MG: at 08:42

## 2020-09-27 RX ADMIN — LACTOBACILLUS TAB SCH TAB: TAB at 08:42

## 2020-09-27 RX ADMIN — POTASSIUM CHLORIDE SCH MEQ: 20 TABLET, EXTENDED RELEASE ORAL at 08:42

## 2020-09-27 RX ADMIN — ENOXAPARIN SODIUM SCH MG: 40 INJECTION SUBCUTANEOUS at 08:42

## 2020-09-27 RX ADMIN — Medication SCH TAB: at 08:42

## 2020-09-27 RX ADMIN — SODIUM CHLORIDE, PRESERVATIVE FREE SCH ML: 5 INJECTION INTRAVENOUS at 09:00

## 2020-09-27 RX ADMIN — POTASSIUM CHLORIDE SCH MEQ: 20 TABLET, EXTENDED RELEASE ORAL at 13:24

## 2020-09-27 RX ADMIN — ONDANSETRON PRN MG: 2 INJECTION, SOLUTION INTRAMUSCULAR; INTRAVENOUS at 09:34

## 2020-09-27 RX ADMIN — MAGNESIUM OXIDE SCH MG: 400 TABLET ORAL at 08:42

## 2020-09-27 RX ADMIN — DIPHENOXYLATE HYDROCHLORIDE AND ATROPINE SULFATE SCH TAB: 2.5; .025 TABLET ORAL at 05:12

## 2020-09-27 RX ADMIN — ONDANSETRON PRN MG: 2 INJECTION, SOLUTION INTRAMUSCULAR; INTRAVENOUS at 01:41

## 2020-10-01 ENCOUNTER — HOSPITAL ENCOUNTER (EMERGENCY)
Dept: HOSPITAL 8 - ED | Age: 60
End: 2020-10-01
Payer: MEDICAID

## 2020-10-01 VITALS — WEIGHT: 176.37 LBS | BODY MASS INDEX: 33.3 KG/M2 | HEIGHT: 61 IN

## 2020-10-01 VITALS — DIASTOLIC BLOOD PRESSURE: 60 MMHG | SYSTOLIC BLOOD PRESSURE: 102 MMHG
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
## 2020-10-01 DIAGNOSIS — F17.290: ICD-10-CM

## 2020-10-01 DIAGNOSIS — F10.229: Primary | ICD-10-CM

## 2020-10-01 DIAGNOSIS — Z72.9: ICD-10-CM

## 2020-10-01 LAB
ALBUMIN SERPL-MCNC: 2.9 G/DL (ref 3.4–5)
ALP SERPL-CCNC: 182 U/L (ref 45–117)
ALT SERPL-CCNC: 69 U/L (ref 12–78)
ANION GAP SERPL CALC-SCNC: 9 MMOL/L (ref 5–15)
BASOPHILS # BLD AUTO: 0.1 X10^3/UL (ref 0–0.1)
BASOPHILS NFR BLD AUTO: 3 % (ref 0–1)
BILIRUB SERPL-MCNC: 0.2 MG/DL (ref 0.2–1)
CALCIUM SERPL-MCNC: 9 MG/DL (ref 8.5–10.1)
CHLORIDE SERPL-SCNC: 110 MMOL/L (ref 98–107)
CREAT SERPL-MCNC: 0.8 MG/DL (ref 0.55–1.02)
EOSINOPHIL # BLD AUTO: 0.11 X10^3/UL (ref 0–0.4)
EOSINOPHIL NFR BLD AUTO: 3 % (ref 1–7)
ERYTHROCYTE [DISTWIDTH] IN BLOOD BY AUTOMATED COUNT: 20.2 % (ref 9.6–15.2)
LYMPHOCYTES # BLD AUTO: 1.53 X10^3/UL (ref 1–3.4)
LYMPHOCYTES NFR BLD AUTO: 45 % (ref 22–44)
MCH RBC QN AUTO: 34.8 PG (ref 27–34.8)
MCHC RBC AUTO-ENTMCNC: 33.4 G/DL (ref 32.4–35.8)
MD: (no result)
MONOCYTES # BLD AUTO: 0.63 X10^3/UL (ref 0.2–0.8)
MONOCYTES NFR BLD AUTO: 19 % (ref 2–9)
NEUTROPHILS # BLD AUTO: 1.03 X10^3/UL (ref 1.8–6.8)
NEUTROPHILS NFR BLD AUTO: 30 % (ref 42–75)
PLATELET # BLD AUTO: 374 X10^3/UL (ref 130–400)
PMV BLD AUTO: 8.1 FL (ref 7.4–10.4)
PROT SERPL-MCNC: 7 G/DL (ref 6.4–8.2)
RBC # BLD AUTO: 3.24 X10^6/UL (ref 3.82–5.3)

## 2020-10-01 PROCEDURE — 99283 EMERGENCY DEPT VISIT LOW MDM: CPT

## 2020-10-01 PROCEDURE — 85025 COMPLETE CBC W/AUTO DIFF WBC: CPT

## 2020-10-01 PROCEDURE — 80053 COMPREHEN METABOLIC PANEL: CPT

## 2020-10-01 PROCEDURE — 80307 DRUG TEST PRSMV CHEM ANLYZR: CPT

## 2020-10-01 PROCEDURE — 36415 COLL VENOUS BLD VENIPUNCTURE: CPT

## 2020-10-01 PROCEDURE — 99406 BEHAV CHNG SMOKING 3-10 MIN: CPT

## 2020-10-02 ENCOUNTER — APPOINTMENT (OUTPATIENT)
Dept: RADIOLOGY | Facility: MEDICAL CENTER | Age: 60
End: 2020-10-02
Attending: EMERGENCY MEDICINE
Payer: MEDICAID

## 2020-10-02 ENCOUNTER — HOSPITAL ENCOUNTER (EMERGENCY)
Facility: MEDICAL CENTER | Age: 60
End: 2020-10-03
Attending: EMERGENCY MEDICINE | Admitting: EMERGENCY MEDICINE
Payer: MEDICAID

## 2020-10-02 DIAGNOSIS — F10.920 ALCOHOLIC INTOXICATION WITHOUT COMPLICATION (HCC): ICD-10-CM

## 2020-10-02 DIAGNOSIS — R09.02 HYPOXIA: ICD-10-CM

## 2020-10-02 LAB — POC BREATHALIZER: 0.39 PERCENT (ref 0–0.01)

## 2020-10-02 PROCEDURE — 70450 CT HEAD/BRAIN W/O DYE: CPT

## 2020-10-02 PROCEDURE — 94760 N-INVAS EAR/PLS OXIMETRY 1: CPT

## 2020-10-02 PROCEDURE — 72125 CT NECK SPINE W/O DYE: CPT

## 2020-10-02 PROCEDURE — 99285 EMERGENCY DEPT VISIT HI MDM: CPT

## 2020-10-02 PROCEDURE — 302970 POC BREATHALIZER: Performed by: EMERGENCY MEDICINE

## 2020-10-02 PROCEDURE — 71045 X-RAY EXAM CHEST 1 VIEW: CPT

## 2020-10-02 ASSESSMENT — FIBROSIS 4 INDEX: FIB4 SCORE: 2.8

## 2020-10-03 ENCOUNTER — APPOINTMENT (OUTPATIENT)
Dept: RADIOLOGY | Facility: MEDICAL CENTER | Age: 60
End: 2020-10-03
Attending: EMERGENCY MEDICINE
Payer: MEDICAID

## 2020-10-03 VITALS
SYSTOLIC BLOOD PRESSURE: 121 MMHG | BODY MASS INDEX: 25.59 KG/M2 | WEIGHT: 149.91 LBS | OXYGEN SATURATION: 92 % | RESPIRATION RATE: 18 BRPM | HEART RATE: 95 BPM | TEMPERATURE: 97.6 F | HEIGHT: 64 IN | DIASTOLIC BLOOD PRESSURE: 76 MMHG

## 2020-10-03 VITALS
HEIGHT: 64 IN | HEART RATE: 103 BPM | RESPIRATION RATE: 18 BRPM | WEIGHT: 150 LBS | TEMPERATURE: 97.7 F | OXYGEN SATURATION: 97 % | SYSTOLIC BLOOD PRESSURE: 146 MMHG | DIASTOLIC BLOOD PRESSURE: 77 MMHG | BODY MASS INDEX: 25.61 KG/M2

## 2020-10-03 DIAGNOSIS — F10.20 ALCOHOLISM (HCC): ICD-10-CM

## 2020-10-03 DIAGNOSIS — F10.10 ALCOHOL ABUSE: ICD-10-CM

## 2020-10-03 LAB
ANION GAP SERPL CALC-SCNC: 19 MMOL/L (ref 7–16)
BUN SERPL-MCNC: 8 MG/DL (ref 8–22)
CALCIUM SERPL-MCNC: 9.1 MG/DL (ref 8.5–10.5)
CHLORIDE SERPL-SCNC: 105 MMOL/L (ref 96–112)
CO2 SERPL-SCNC: 20 MMOL/L (ref 20–33)
CREAT SERPL-MCNC: 0.66 MG/DL (ref 0.5–1.4)
ETHANOL BLD-MCNC: 414.8 MG/DL (ref 0–10.1)
GLUCOSE SERPL-MCNC: 111 MG/DL (ref 65–99)
POC BREATHALIZER: 0.15 PERCENT (ref 0–0.01)
POTASSIUM SERPL-SCNC: 3.3 MMOL/L (ref 3.6–5.5)
SODIUM SERPL-SCNC: 144 MMOL/L (ref 135–145)

## 2020-10-03 PROCEDURE — 80307 DRUG TEST PRSMV CHEM ANLYZR: CPT

## 2020-10-03 PROCEDURE — 80048 BASIC METABOLIC PNL TOTAL CA: CPT

## 2020-10-03 PROCEDURE — 70450 CT HEAD/BRAIN W/O DYE: CPT

## 2020-10-03 PROCEDURE — 302970 POC BREATHALIZER: Performed by: EMERGENCY MEDICINE

## 2020-10-03 PROCEDURE — 99285 EMERGENCY DEPT VISIT HI MDM: CPT

## 2020-10-03 ASSESSMENT — FIBROSIS 4 INDEX: FIB4 SCORE: 2.8

## 2020-10-03 ASSESSMENT — LIFESTYLE VARIABLES
DO YOU DRINK ALCOHOL: YES
DO YOU DRINK ALCOHOL: YES

## 2020-10-03 NOTE — ED NOTES
Pt climbing out of the gurney.  Pt to restroom, pt ambulated with steady gait.    When pt got back to room pt started to get dressed.  ERP informed and will come for re-eval of pt.  Pt placed on pulse ox to assess RA sat.

## 2020-10-03 NOTE — ED NOTES
Pt sat dropped to 74% on RA while sleeping.  Repositioned sitting up, placed on 2L per NC.  95% on 2L.  Denies cough
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
## 2020-10-03 NOTE — ED NOTES
Pt re-vitaled, pt's vitals stable, now not requiring O2 while sitting up in bed. Pt given two glasses of water. Breathalizer 0.147.   Pt up to restroom with steady gait.   ERP updated.
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
## 2020-10-03 NOTE — ED PROVIDER NOTES
ED PROVIDER NOTE    Scribed for Rober Henson M.D. by Edwardo Alvares. 10/3/2020, 1:19 AM.    This is an addendum to the note on Ashleigh Marquis. For further details and full chart entry, see the previously signed ED Provider Note written by Dr. Andrade ([facility]).      1:19 AM - I discussed the patient's case with Dr. Andrade (ERP) who will transfer care of the patient to me at this time.        5:01 AM - Per RN, the patient's breathalizer has decreased down to 0.147. She is ambulatory to the bathroom with a steady gait. I reevaluated the patient at bedside. Vital signs are stable and patient is tolerating PO fluid intake. She is no longer requiring O2 supplementation.  She denies any history of cough, chest pain, leg swelling, other complaints to suggest PE, pneumonia, COVID-19.  Discharge instructions provided. Patient verbalizes understanding and agreement to this plan of care.  She was provided with substance abuse treatment resources.  She was counseled on alcohol cessation.    FINAL IMPRESSION   1. Alcoholic intoxication without complication (HCC)    2. Hypoxia        IEdwardo (Scribe), am scribing for, and in the presence of, Rober Henson M.D.     Electronically signed by: Edwardo Alvares (Scribe), 10/3/2020    Rober GAN M.D. personally performed the services described in this documentation, as scribed by Edwardo Alvares in my presence, and it is both accurate and complete.    The note accurately reflects work and decisions made by me.  Rober Henson M.D.  10/3/2020  6:42 AM

## 2020-10-03 NOTE — ED NOTES
Discharge instructions given to pt. Prescriptions unchanged. Pt educated, verbalizes understanding. All belongings accounted for. Pt ambulated out of ED with steady gait to go home by self. Provided with resources for drug and alcohol.

## 2020-10-03 NOTE — ED NOTES
Pt resting quietly in room with lights turned down.  Pt lying right side, appears asleep at this time.  Respirations even and unlabored.  Pulse ox and O2 remain on patient.

## 2020-10-03 NOTE — ED NOTES
PT UP AMBULATING WITH STEADY GAIT. REPEATEDLY DEMANDING FOOD.  SPEAKING CLEARLY IN FULL SENTENCES. ESCORTED BACK TO ROOM SEVERAL TIMES.  FOOD GIVEN

## 2020-10-03 NOTE — ED NOTES
Pt resting quietly in room with lights turned down.  Respirations even and unlabored, remains on pulse ox and O2.

## 2020-10-04 ENCOUNTER — HOSPITAL ENCOUNTER (EMERGENCY)
Facility: MEDICAL CENTER | Age: 60
End: 2020-10-04
Attending: EMERGENCY MEDICINE | Admitting: EMERGENCY MEDICINE
Payer: MEDICAID

## 2020-10-04 VITALS
TEMPERATURE: 96.7 F | DIASTOLIC BLOOD PRESSURE: 84 MMHG | HEART RATE: 95 BPM | WEIGHT: 149 LBS | SYSTOLIC BLOOD PRESSURE: 121 MMHG | BODY MASS INDEX: 25.44 KG/M2 | RESPIRATION RATE: 16 BRPM | HEIGHT: 64 IN | OXYGEN SATURATION: 98 %

## 2020-10-04 DIAGNOSIS — F10.10 ALCOHOL ABUSE: ICD-10-CM
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
## 2020-10-04 DIAGNOSIS — F10.920 ALCOHOLIC INTOXICATION WITHOUT COMPLICATION (HCC): ICD-10-CM

## 2020-10-04 PROCEDURE — 99284 EMERGENCY DEPT VISIT MOD MDM: CPT

## 2020-10-04 ASSESSMENT — FIBROSIS 4 INDEX: FIB4 SCORE: 2.8
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
## 2020-10-16 NOTE — ED NOTES
Lab at bedside, pt reports relief of pain after medication administration.    Problem: Falls - Risk of:  Goal: Will remain free from falls  Description: Will remain free from falls  10/16/2020 0425 by Giuseppe Burleson RN  Outcome: Ongoing  Note: No falls to date. Bed in lowest position with call light within reach. Floor free from obstacles and pt verbalizes understanding to call out with needs or assistance with ambulation. Falls risk score evaluated-high risk. Bed alarm activated and falling star posted. Will continue to monitor additional needs. 10/15/2020 1804 by Harper Lockhart RN  Outcome: Ongoing  Goal: Absence of physical injury  Description: Absence of physical injury  10/16/2020 0425 by Giuseppe Burleson RN  Outcome: Ongoing  10/15/2020 1804 by Harper Lockhart RN  Outcome: Ongoing     Problem: Skin Integrity:  Goal: Will show no infection signs and symptoms  Description: Will show no infection signs and symptoms  10/16/2020 0425 by Giuseppe Burleson RN  Outcome: Ongoing  10/15/2020 1804 by Harper Lockhart RN  Outcome: Ongoing  Goal: Absence of new skin breakdown  Description: Absence of new skin breakdown  10/15/2020 1804 by Harper Lockhart RN  Outcome: Ongoing     Problem: Pain:  Goal: Pain level will decrease  Description: Pain level will decrease  10/16/2020 0425 by Giuseppe Burleson RN  Outcome: Ongoing  10/15/2020 1804 by Harper Lockhart RN  Outcome: Met This Shift  Goal: Control of acute pain  Description: Control of acute pain  10/16/2020 0425 by Giuseppe Burleson RN  Outcome: Ongoing  Note: Pain level assessment complete. Pt rated headache pain on 0-10 scale. Pt educated on pain scale and control interventions. PRN pain medication given per pt request. Pt verbalizes understanding to call out with new onset of pain or unrelieved pain. Will continue to monitor.     10/15/2020 1804 by Harper Lockhart RN  Outcome: Ongoing  Goal: Control of chronic pain  Description: Control of chronic pain  10/15/2020 1804 by Harper Lockhart RN  Outcome: Ongoing

## 2020-10-24 ENCOUNTER — HOSPITAL ENCOUNTER (EMERGENCY)
Dept: HOSPITAL 8 - ED | Age: 60
Discharge: HOME | End: 2020-10-24
Payer: MEDICAID

## 2020-10-24 VITALS — SYSTOLIC BLOOD PRESSURE: 142 MMHG | DIASTOLIC BLOOD PRESSURE: 82 MMHG

## 2020-10-24 DIAGNOSIS — R10.9: ICD-10-CM

## 2020-10-24 DIAGNOSIS — K21.9: ICD-10-CM

## 2020-10-24 DIAGNOSIS — E11.9: ICD-10-CM

## 2020-10-24 DIAGNOSIS — R11.10: Primary | ICD-10-CM

## 2020-10-24 DIAGNOSIS — Z72.9: ICD-10-CM

## 2020-10-24 DIAGNOSIS — F10.120: ICD-10-CM

## 2020-10-24 DIAGNOSIS — E87.6: ICD-10-CM

## 2020-10-24 DIAGNOSIS — I10: ICD-10-CM
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
## 2020-10-24 LAB
ANION GAP SERPL CALC-SCNC: 9 MMOL/L (ref 5–15)
BASOPHILS # BLD AUTO: 0.1 X10^3/UL (ref 0–0.1)
BASOPHILS NFR BLD AUTO: 1 % (ref 0–1)
CALCIUM SERPL-MCNC: 9 MG/DL (ref 8.5–10.1)
CHLORIDE SERPL-SCNC: 92 MMOL/L (ref 98–107)
CREAT SERPL-MCNC: 0.71 MG/DL (ref 0.55–1.02)
EOSINOPHIL # BLD AUTO: 0 X10^3/UL (ref 0–0.4)
EOSINOPHIL NFR BLD AUTO: 0 % (ref 1–7)
ERYTHROCYTE [DISTWIDTH] IN BLOOD BY AUTOMATED COUNT: 17.4 % (ref 9.6–15.2)
LYMPHOCYTES # BLD AUTO: 0.8 X10^3/UL (ref 1–3.4)
LYMPHOCYTES NFR BLD AUTO: 11 % (ref 22–44)
MCH RBC QN AUTO: 32.5 PG (ref 27–34.8)
MCHC RBC AUTO-ENTMCNC: 33.5 G/DL (ref 32.4–35.8)
MD: NO
MICROSCOPIC: (no result)
MONOCYTES # BLD AUTO: 0.4 X10^3/UL (ref 0.2–0.8)
MONOCYTES NFR BLD AUTO: 6 % (ref 2–9)
NEUTROPHILS # BLD AUTO: 5.6 X10^3/UL (ref 1.8–6.8)
NEUTROPHILS NFR BLD AUTO: 81 % (ref 42–75)
PLATELET # BLD AUTO: 566 X10^3/UL (ref 130–400)
PMV BLD AUTO: 7.3 FL (ref 7.4–10.4)
RBC # BLD AUTO: 3.81 X10^6/UL (ref 3.82–5.3)

## 2020-10-24 PROCEDURE — 85025 COMPLETE CBC W/AUTO DIFF WBC: CPT

## 2020-10-24 PROCEDURE — 80048 BASIC METABOLIC PNL TOTAL CA: CPT

## 2020-10-24 PROCEDURE — 81001 URINALYSIS AUTO W/SCOPE: CPT

## 2020-10-24 PROCEDURE — 99284 EMERGENCY DEPT VISIT MOD MDM: CPT

## 2020-10-24 PROCEDURE — 36415 COLL VENOUS BLD VENIPUNCTURE: CPT

## 2020-10-28 ENCOUNTER — HOSPITAL ENCOUNTER (EMERGENCY)
Facility: MEDICAL CENTER | Age: 60
End: 2020-10-28
Attending: EMERGENCY MEDICINE
Payer: MEDICAID

## 2020-10-28 VITALS
RESPIRATION RATE: 16 BRPM | SYSTOLIC BLOOD PRESSURE: 126 MMHG | HEIGHT: 64 IN | DIASTOLIC BLOOD PRESSURE: 78 MMHG | WEIGHT: 149 LBS | HEART RATE: 88 BPM | BODY MASS INDEX: 25.44 KG/M2 | OXYGEN SATURATION: 99 % | TEMPERATURE: 97.9 F

## 2020-10-28 DIAGNOSIS — F10.920 ALCOHOLIC INTOXICATION WITHOUT COMPLICATION (HCC): ICD-10-CM

## 2020-10-28 DIAGNOSIS — F10.10 ALCOHOL ABUSE: ICD-10-CM

## 2020-10-28 PROCEDURE — 99284 EMERGENCY DEPT VISIT MOD MDM: CPT

## 2020-10-28 ASSESSMENT — FIBROSIS 4 INDEX: FIB4 SCORE: 2.8

## 2020-10-29 NOTE — ED PROVIDER NOTES
"ED Provider Note    CHIEF COMPLAINT  Chief Complaint   Patient presents with   • ETOH Intoxication     pt highly intoxicated, reports vomiting blood once today; pt drowsy and reluctant to answer questions; admits to drinking \"a lot\" today       HPI  Ashleigh Marquis is a 59 y.o. female who presents to the emergency room today with complaints of alcohol intoxication.  Patient reports blood in her stool not vomitus per nurses note.  She has had multiple visits in the past medical intoxication is well-known to this emergency room.  When asked to quantify how much alcohol she is took she states \"a lot\".  Denies chest pain or shortness of breath no fever chills she appears in no distress on exam.    REVIEW OF SYSTEMS  See HPI for further details. All other systems are negative.     PAST MEDICAL HISTORY  Past Medical History:   Diagnosis Date   • Cancer (HCC) 1981    cervical   • Alcoholism (HCC)    • Anxiety    • Arthritis    • ASTHMA    • Chronic low back pain    • Congestive heart failure (HCC)    • Depression    • EtOH dependence (HCC)    • Fall     alcohol related   • GERD (gastroesophageal reflux disease)    • HTN    • Hypertension    • Indigestion     GERD   • Muscle disorder    • OSTEOPOROSIS    • Other specified symptom associated with female genital organs     \"I have fibroids bad\"\"   • Psychiatric disorder    • PTSD (post-traumatic stress disorder)    • Renal disorder     Hx of stones   • Seizure (HCC)     several years ago r/t alcohol withdrawl   • Ulcer    • Vitamin D deficiency        FAMILY HISTORY  Family History   Problem Relation Age of Onset   • Heart Disease Father    • Hypertension Father    • Diabetes Father    • Cancer Paternal Grandmother    • Depression Other    • Lung Disease Neg Hx    • Stroke Neg Hx        SOCIAL HISTORY  Social History     Socioeconomic History   • Marital status:      Spouse name: Not on file   • Number of children: Not on file   • Years of education: Not on file "   • Highest education level: Not on file   Occupational History   • Not on file   Social Needs   • Financial resource strain: Hard   • Food insecurity     Worry: Sometimes true     Inability: Sometimes true   • Transportation needs     Medical: Yes     Non-medical: Yes   Tobacco Use   • Smoking status: Current Every Day Smoker     Packs/day: 0.50     Years: 20.00     Pack years: 10.00     Types: Cigarettes   • Smokeless tobacco: Never Used   • Tobacco comment: 1/2 pack per day   Substance and Sexual Activity   • Alcohol use: Yes     Frequency: 4 or more times a week     Binge frequency: Daily or almost daily     Comment: vodka, heavy use   • Drug use: No   • Sexual activity: Never     Partners: Male   Lifestyle   • Physical activity     Days per week: Not on file     Minutes per session: Not on file   • Stress: Not on file   Relationships   • Social connections     Talks on phone: Not on file     Gets together: Not on file     Attends Mandaeism service: Not on file     Active member of club or organization: Not on file     Attends meetings of clubs or organizations: Not on file     Relationship status: Not on file   • Intimate partner violence     Fear of current or ex partner: Not on file     Emotionally abused: Not on file     Physically abused: Not on file     Forced sexual activity: Not on file   Other Topics Concern   • Not on file   Social History Narrative    ** Merged History Encounter **            SURGICAL HISTORY  Past Surgical History:   Procedure Laterality Date   • GASTROSCOPY-ENDO N/A 10/3/2018    Procedure: GASTROSCOPY-ENDO;  Surgeon: Ben Negro M.D.;  [facility];  Service: Gastroenterology   • CYSTOSCOPY STENT PLACEMENT  11/9/2010    Performed by ANGEL HARRIS at [facility]   • URETEROSCOPY  11/9/2010    Performed by ANGEL AHRRIS at [facility]   • LASERTRIPSY  11/9/2010    Performed by ANGEL HARRIS at [facility]   • STENT  "REMOVAL  11/9/2010    Performed by ANGEL HARRIS at SURGERY [facility]   • CYSTO STENT PLACEMNT PRE SURG  10/7/2010    Performed by ANGEL HARRIS at SURGERY [facility]   • HERNIA REPAIR  1977       CURRENT MEDICATIONS  Home Medications    **Home medications have not yet been reviewed for this encounter**         ALLERGIES  Allergies   Allergen Reactions   • Sulfa Drugs Vomiting   • Toradol Vomiting       PHYSICAL EXAM  VITAL SIGNS: /78   Pulse 88   Temp 36.6 °C (97.9 °F) (Temporal)   Resp 16   Ht 1.626 m (5' 4\")   Wt 67.6 kg (149 lb)   LMP 11/02/2015   SpO2 99%   BMI 25.58 kg/m²       Constitutional: Well developed, Well nourished, No acute distress, Non-toxic appearance.   HENT: Normocephalic, Atraumatic, Bilateral external ears normal, Oropharynx moist, No oral exudates, Nose normal.   Eyes: PERRLA, EOMI, Conjunctiva normal, No discharge.   Neck: Normal range of motion, No tenderness, Supple, No stridor.   Cardiovascular: Normal heart rate, Normal rhythm, No murmurs, No rubs, No gallops.   Thorax & Lungs: Normal breath sounds, No respiratory distress, No wheezing, No chest tenderness.  Abdomen: Bowel sounds normal, Soft, No tenderness, No masses, No pulsatile masses.    Skin: Warm, Dry, No erythema, No rash.   Extremities: Intact distal pulses, No edema, No tenderness, No cyanosis, No clubbing.   Neurologic: No neurological deficits  Psychiatric: No suicidal or homicidal ideation  Rectal; rectal exam for Hemoccult negative.  Nurse chaperone.          COURSE & MEDICAL DECISION MAKING  Pertinent Labs & Imaging studies reviewed. (See chart for details)  Was kept until she is awake alert ambulatory and sober and released to home once again given referral for primary care physician resources and information on outpatient treatment for alcohol.    FINAL IMPRESSION  1.  Acute alcohol intoxication  2.  Hypertension by history  3.      Electronically signed by: Ervin Youngblood D.O., 10/29/2020 " 3:07 AM

## 2020-10-29 NOTE — ED TRIAGE NOTES
"Ashleigh Marquis  59 y.o.  Chief Complaint   Patient presents with   • ETOH Intoxication     pt highly intoxicated, reports vomiting blood once today; pt drowsy and reluctant to answer questions; admits to drinking \"a lot\" today     "

## 2020-11-03 ENCOUNTER — APPOINTMENT (OUTPATIENT)
Dept: RADIOLOGY | Facility: MEDICAL CENTER | Age: 60
End: 2020-11-03
Attending: EMERGENCY MEDICINE
Payer: MEDICAID

## 2020-11-03 ENCOUNTER — HOSPITAL ENCOUNTER (EMERGENCY)
Facility: MEDICAL CENTER | Age: 60
End: 2020-11-03
Attending: EMERGENCY MEDICINE
Payer: MEDICAID

## 2020-11-03 VITALS
WEIGHT: 150 LBS | DIASTOLIC BLOOD PRESSURE: 122 MMHG | SYSTOLIC BLOOD PRESSURE: 190 MMHG | HEART RATE: 123 BPM | TEMPERATURE: 97 F | HEIGHT: 64 IN | RESPIRATION RATE: 18 BRPM | BODY MASS INDEX: 25.61 KG/M2 | OXYGEN SATURATION: 92 %

## 2020-11-03 DIAGNOSIS — F10.20 ALCOHOLISM (HCC): ICD-10-CM

## 2020-11-03 DIAGNOSIS — S00.03XA HEMATOMA OF SCALP, INITIAL ENCOUNTER: ICD-10-CM

## 2020-11-03 DIAGNOSIS — R46.89 AGGRESSIVE BEHAVIOR: ICD-10-CM

## 2020-11-03 LAB — EKG IMPRESSION: NORMAL

## 2020-11-03 PROCEDURE — 700111 HCHG RX REV CODE 636 W/ 250 OVERRIDE (IP): Performed by: EMERGENCY MEDICINE

## 2020-11-03 PROCEDURE — 96374 THER/PROPH/DIAG INJ IV PUSH: CPT

## 2020-11-03 PROCEDURE — 99285 EMERGENCY DEPT VISIT HI MDM: CPT

## 2020-11-03 PROCEDURE — 93005 ELECTROCARDIOGRAM TRACING: CPT | Performed by: EMERGENCY MEDICINE

## 2020-11-03 PROCEDURE — 70450 CT HEAD/BRAIN W/O DYE: CPT

## 2020-11-03 PROCEDURE — 96372 THER/PROPH/DIAG INJ SC/IM: CPT | Mod: XU

## 2020-11-03 PROCEDURE — 700101 HCHG RX REV CODE 250: Performed by: EMERGENCY MEDICINE

## 2020-11-03 RX ORDER — PHENOBARBITAL SODIUM 130 MG/ML
130 INJECTION, SOLUTION INTRAMUSCULAR; INTRAVENOUS ONCE
Status: DISCONTINUED | OUTPATIENT
Start: 2020-11-03 | End: 2020-11-03 | Stop reason: HOSPADM

## 2020-11-03 RX ORDER — KETAMINE HYDROCHLORIDE 50 MG/ML
75 INJECTION, SOLUTION INTRAMUSCULAR; INTRAVENOUS ONCE
Status: COMPLETED | OUTPATIENT
Start: 2020-11-03 | End: 2020-11-03

## 2020-11-03 RX ORDER — LORAZEPAM 2 MG/ML
1 INJECTION INTRAMUSCULAR ONCE
Status: DISCONTINUED | OUTPATIENT
Start: 2020-11-03 | End: 2020-11-03 | Stop reason: HOSPADM

## 2020-11-03 RX ORDER — LORAZEPAM 2 MG/ML
2 INJECTION INTRAMUSCULAR ONCE
Status: COMPLETED | OUTPATIENT
Start: 2020-11-03 | End: 2020-11-03

## 2020-11-03 RX ADMIN — LORAZEPAM 2 MG: 2 INJECTION INTRAMUSCULAR; INTRAVENOUS at 19:30

## 2020-11-03 RX ADMIN — KETAMINE HYDROCHLORIDE 75 MG: 50 INJECTION INTRAMUSCULAR; INTRAVENOUS at 18:29

## 2020-11-03 ASSESSMENT — FIBROSIS 4 INDEX: FIB4 SCORE: 2.8
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
## 2020-11-04 NOTE — ED NOTES
PT had a witnessed tonic clonic seizure. RN was in room to administer medications to PT. PT verbalized understanding of medications and then began to have a tonic clonic seizure. PT's seizure lasted approx. 1.5 minutes.
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
## 2020-11-04 NOTE — ED NOTES
PT escorted to room via gurney with triage RN. PT came in for a GLF with a large hematoma on the R posterior scalp. While in triage PT had a reported tonic-clonic seizure that lasted approx. 1 minute. PT is currently altered and verbally aggressive towards staff. PT is repetitive. PT has blood in mouth from GLF.
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
## 2020-11-04 NOTE — ED NOTES
Assist RN: Discharge instructions discussed with pt and pt verbalized understanding. Pt discharged via wheelchair with security assist.

## 2020-11-04 NOTE — ED NOTES
PT is currently calm in bed. Verbalized understanding for discontinuing restraints. Security called to discontinue 2 of the 4 restraints.
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
## 2020-11-04 NOTE — DISCHARGE INSTRUCTIONS
Alcoholics Anonymous       [address]      [phone]    Alcoholics Anonymous     [address]     [phone]
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
## 2020-11-04 NOTE — ED TRIAGE NOTES
Chief Complaint   Patient presents with   • T-5000 GLF     pt bib ems from [facility], c/o glf has hematoma in the head. (-)blood thinners. (-)loc. (+)etoh. has uaud=172. aaox.4     Educated on triage process. Instructed to notify staff for any worsening symptoms. Denies any recent travel. Denies exposure to known covid positive patients. Denies any respiratory symptoms.
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
## 2021-03-08 ENCOUNTER — HOSPITAL ENCOUNTER (OUTPATIENT)
Dept: RADIOLOGY | Facility: MEDICAL CENTER | Age: 61
End: 2021-03-08
Payer: MEDICAID

## 2021-03-10 ENCOUNTER — TELEPHONE (OUTPATIENT)
Dept: RADIOLOGY | Facility: MEDICAL CENTER | Age: 61
End: 2021-03-10

## 2021-03-10 NOTE — TELEPHONE ENCOUNTER
3/10/2021- ni with Elma @ [facility] ([phone]) to get pt scheduled for Shahnaz Clip Placement, protocol has been completed- DARSHAN

## 2021-03-18 ENCOUNTER — HOSPITAL ENCOUNTER (OUTPATIENT)
Dept: RADIOLOGY | Facility: MEDICAL CENTER | Age: 61
End: 2021-03-18
Attending: SURGERY
Payer: MEDICAID

## 2021-03-18 DIAGNOSIS — C50.211 MALIGNANT NEOPLASM OF UPPER-INNER QUADRANT OF RIGHT FEMALE BREAST, UNSPECIFIED ESTROGEN RECEPTOR STATUS (HCC): ICD-10-CM

## 2021-03-18 PROCEDURE — 19285 PERQ DEV BREAST 1ST US IMAG: CPT | Mod: RT

## 2021-03-23 ENCOUNTER — PRE-ADMISSION TESTING (OUTPATIENT)
Dept: ADMISSIONS | Facility: MEDICAL CENTER | Age: 61
End: 2021-03-23
Attending: SURGERY
Payer: MEDICAID

## 2021-03-23 DIAGNOSIS — Z01.812 PRE-OPERATIVE LABORATORY EXAMINATION: ICD-10-CM

## 2021-03-23 LAB
ANION GAP SERPL CALC-SCNC: 10 MMOL/L (ref 7–16)
BUN SERPL-MCNC: 17 MG/DL (ref 8–22)
CALCIUM SERPL-MCNC: 9.9 MG/DL (ref 8.5–10.5)
CHLORIDE SERPL-SCNC: 101 MMOL/L (ref 96–112)
CO2 SERPL-SCNC: 26 MMOL/L (ref 20–33)
CREAT SERPL-MCNC: 0.66 MG/DL (ref 0.5–1.4)
ERYTHROCYTE [DISTWIDTH] IN BLOOD BY AUTOMATED COUNT: 45.4 FL (ref 35.9–50)
GLUCOSE SERPL-MCNC: 99 MG/DL (ref 65–99)
HCT VFR BLD AUTO: 33.2 % (ref 37–47)
HGB BLD-MCNC: 10.1 G/DL (ref 12–16)
MCH RBC QN AUTO: 26.2 PG (ref 27–33)
MCHC RBC AUTO-ENTMCNC: 30.4 G/DL (ref 33.6–35)
MCV RBC AUTO: 86.2 FL (ref 81.4–97.8)
PLATELET # BLD AUTO: 332 K/UL (ref 164–446)
PMV BLD AUTO: 10.6 FL (ref 9–12.9)
POTASSIUM SERPL-SCNC: 4 MMOL/L (ref 3.6–5.5)
RBC # BLD AUTO: 3.85 M/UL (ref 4.2–5.4)
SARS-COV-2 RNA RESP QL NAA+PROBE: NOTDETECTED
SODIUM SERPL-SCNC: 137 MMOL/L (ref 135–145)
SPECIMEN SOURCE: NORMAL
WBC # BLD AUTO: 7 K/UL (ref 4.8–10.8)

## 2021-03-23 PROCEDURE — 80048 BASIC METABOLIC PNL TOTAL CA: CPT

## 2021-03-23 PROCEDURE — C9803 HOPD COVID-19 SPEC COLLECT: HCPCS

## 2021-03-23 PROCEDURE — 85027 COMPLETE CBC AUTOMATED: CPT

## 2021-03-23 PROCEDURE — 36415 COLL VENOUS BLD VENIPUNCTURE: CPT

## 2021-03-23 PROCEDURE — U0003 INFECTIOUS AGENT DETECTION BY NUCLEIC ACID (DNA OR RNA); SEVERE ACUTE RESPIRATORY SYNDROME CORONAVIRUS 2 (SARS-COV-2) (CORONAVIRUS DISEASE [COVID-19]), AMPLIFIED PROBE TECHNIQUE, MAKING USE OF HIGH THROUGHPUT TECHNOLOGIES AS DESCRIBED BY CMS-2020-01-R: HCPCS

## 2021-03-23 PROCEDURE — U0005 INFEC AGEN DETEC AMPLI PROBE: HCPCS

## 2021-03-23 RX ORDER — CLONAZEPAM 2 MG/1
2 TABLET ORAL 3 TIMES DAILY PRN
Status: ON HOLD | COMMUNITY
Start: 2021-02-25 | End: 2022-01-01

## 2021-03-23 RX ORDER — FLUOXETINE HYDROCHLORIDE 40 MG/1
40 CAPSULE ORAL DAILY
COMMUNITY
End: 2022-01-01

## 2021-03-23 RX ORDER — LISINOPRIL 10 MG/1
10 TABLET ORAL DAILY
Status: ON HOLD | COMMUNITY
Start: 2021-01-07 | End: 2022-01-01

## 2021-03-23 RX ORDER — GABAPENTIN 400 MG/1
400 CAPSULE ORAL 3 TIMES DAILY
COMMUNITY
Start: 2021-03-10 | End: 2022-01-01

## 2021-03-23 ASSESSMENT — FIBROSIS 4 INDEX: FIB4 SCORE: 2.84

## 2021-03-23 NOTE — OR NURSING
"Surgery and medications instructions given per anesthesia protocol. Pt appeared intoxicated or under the influence of something even though pt stated she was not, was hard of hearing, and falling asleep during the appt. (pt stated she was \"very tired\" because she woke up at 4 am after going to bed at 1am).   "

## 2021-03-26 ENCOUNTER — HOSPITAL ENCOUNTER (OUTPATIENT)
Facility: MEDICAL CENTER | Age: 61
End: 2021-03-26
Attending: SURGERY | Admitting: SURGERY
Payer: MEDICAID

## 2021-03-26 ENCOUNTER — ANESTHESIA (OUTPATIENT)
Dept: SURGERY | Facility: MEDICAL CENTER | Age: 61
End: 2021-03-26
Payer: MEDICAID

## 2021-03-26 ENCOUNTER — APPOINTMENT (OUTPATIENT)
Dept: RADIOLOGY | Facility: MEDICAL CENTER | Age: 61
End: 2021-03-26
Attending: SURGERY
Payer: MEDICAID

## 2021-03-26 ENCOUNTER — ANESTHESIA EVENT (OUTPATIENT)
Dept: SURGERY | Facility: MEDICAL CENTER | Age: 61
End: 2021-03-26
Payer: MEDICAID

## 2021-03-26 VITALS
RESPIRATION RATE: 20 BRPM | HEIGHT: 64 IN | TEMPERATURE: 97.5 F | WEIGHT: 190.04 LBS | DIASTOLIC BLOOD PRESSURE: 104 MMHG | HEART RATE: 90 BPM | OXYGEN SATURATION: 94 % | SYSTOLIC BLOOD PRESSURE: 167 MMHG | BODY MASS INDEX: 32.44 KG/M2

## 2021-03-26 DIAGNOSIS — C50.211 MALIGNANT NEOPLASM OF UPPER-INNER QUADRANT OF RIGHT FEMALE BREAST, UNSPECIFIED ESTROGEN RECEPTOR STATUS (HCC): ICD-10-CM

## 2021-03-26 DIAGNOSIS — G89.18 POSTOPERATIVE PAIN: ICD-10-CM

## 2021-03-26 LAB
ALBUMIN SERPL BCP-MCNC: 4 G/DL (ref 3.2–4.9)
ALBUMIN/GLOB SERPL: 1.2 G/DL
ALP SERPL-CCNC: 128 U/L (ref 30–99)
ALT SERPL-CCNC: 17 U/L (ref 2–50)
ANION GAP SERPL CALC-SCNC: 8 MMOL/L (ref 7–16)
AST SERPL-CCNC: 19 U/L (ref 12–45)
BILIRUB SERPL-MCNC: 0.2 MG/DL (ref 0.1–1.5)
BUN SERPL-MCNC: 22 MG/DL (ref 8–22)
CALCIUM SERPL-MCNC: 9.8 MG/DL (ref 8.5–10.5)
CHLORIDE SERPL-SCNC: 106 MMOL/L (ref 96–112)
CO2 SERPL-SCNC: 25 MMOL/L (ref 20–33)
CREAT SERPL-MCNC: 0.72 MG/DL (ref 0.5–1.4)
GLOBULIN SER CALC-MCNC: 3.4 G/DL (ref 1.9–3.5)
GLUCOSE SERPL-MCNC: 83 MG/DL (ref 65–99)
PATHOLOGY CONSULT NOTE: NORMAL
POTASSIUM SERPL-SCNC: 3.9 MMOL/L (ref 3.6–5.5)
PROT SERPL-MCNC: 7.4 G/DL (ref 6–8.2)
SODIUM SERPL-SCNC: 139 MMOL/L (ref 135–145)

## 2021-03-26 PROCEDURE — 160025 RECOVERY II MINUTES (STATS): Performed by: SURGERY

## 2021-03-26 PROCEDURE — 38792 RA TRACER ID OF SENTINL NODE: CPT | Mod: RT

## 2021-03-26 PROCEDURE — 88305 TISSUE EXAM BY PATHOLOGIST: CPT

## 2021-03-26 PROCEDURE — 501838 HCHG SUTURE GENERAL: Performed by: SURGERY

## 2021-03-26 PROCEDURE — 80053 COMPREHEN METABOLIC PANEL: CPT

## 2021-03-26 PROCEDURE — 76098 X-RAY EXAM SURGICAL SPECIMEN: CPT | Mod: RT

## 2021-03-26 PROCEDURE — A9270 NON-COVERED ITEM OR SERVICE: HCPCS | Performed by: SURGERY

## 2021-03-26 PROCEDURE — 160047 HCHG PACU  - EA ADDL 30 MINS PHASE II: Performed by: SURGERY

## 2021-03-26 PROCEDURE — 700111 HCHG RX REV CODE 636 W/ 250 OVERRIDE (IP): Performed by: ANESTHESIOLOGY

## 2021-03-26 PROCEDURE — 160048 HCHG OR STATISTICAL LEVEL 1-5: Performed by: SURGERY

## 2021-03-26 PROCEDURE — 160002 HCHG RECOVERY MINUTES (STAT): Performed by: SURGERY

## 2021-03-26 PROCEDURE — 700101 HCHG RX REV CODE 250: Performed by: ANESTHESIOLOGY

## 2021-03-26 PROCEDURE — 160046 HCHG PACU - 1ST 60 MINS PHASE II: Performed by: SURGERY

## 2021-03-26 PROCEDURE — 160039 HCHG SURGERY MINUTES - EA ADDL 1 MIN LEVEL 3: Performed by: SURGERY

## 2021-03-26 PROCEDURE — 88307 TISSUE EXAM BY PATHOLOGIST: CPT | Mod: 59

## 2021-03-26 PROCEDURE — A9270 NON-COVERED ITEM OR SERVICE: HCPCS | Performed by: ANESTHESIOLOGY

## 2021-03-26 PROCEDURE — 700101 HCHG RX REV CODE 250: Performed by: SURGERY

## 2021-03-26 PROCEDURE — 160009 HCHG ANES TIME/MIN: Performed by: SURGERY

## 2021-03-26 PROCEDURE — 500002 HCHG ADHESIVE, DERMABOND: Performed by: SURGERY

## 2021-03-26 PROCEDURE — 700102 HCHG RX REV CODE 250 W/ 637 OVERRIDE(OP): Performed by: SURGERY

## 2021-03-26 PROCEDURE — 700102 HCHG RX REV CODE 250 W/ 637 OVERRIDE(OP): Performed by: ANESTHESIOLOGY

## 2021-03-26 PROCEDURE — 700111 HCHG RX REV CODE 636 W/ 250 OVERRIDE (IP): Performed by: SURGERY

## 2021-03-26 PROCEDURE — 700105 HCHG RX REV CODE 258: Performed by: SURGERY

## 2021-03-26 PROCEDURE — 160028 HCHG SURGERY MINUTES - 1ST 30 MINS LEVEL 3: Performed by: SURGERY

## 2021-03-26 PROCEDURE — 160035 HCHG PACU - 1ST 60 MINS PHASE I: Performed by: SURGERY

## 2021-03-26 RX ORDER — ISOSULFAN BLUE 50 MG/5ML
INJECTION, SOLUTION SUBCUTANEOUS
Status: DISCONTINUED
Start: 2021-03-26 | End: 2021-03-26 | Stop reason: HOSPADM

## 2021-03-26 RX ORDER — ONDANSETRON 2 MG/ML
INJECTION INTRAMUSCULAR; INTRAVENOUS PRN
Status: DISCONTINUED | OUTPATIENT
Start: 2021-03-26 | End: 2021-03-26 | Stop reason: SURG

## 2021-03-26 RX ORDER — DIPHENHYDRAMINE HYDROCHLORIDE 50 MG/ML
12.5 INJECTION INTRAMUSCULAR; INTRAVENOUS
Status: DISCONTINUED | OUTPATIENT
Start: 2021-03-26 | End: 2021-03-26 | Stop reason: HOSPADM

## 2021-03-26 RX ORDER — HYDROMORPHONE HYDROCHLORIDE 1 MG/ML
0.2 INJECTION, SOLUTION INTRAMUSCULAR; INTRAVENOUS; SUBCUTANEOUS
Status: DISCONTINUED | OUTPATIENT
Start: 2021-03-26 | End: 2021-03-26 | Stop reason: HOSPADM

## 2021-03-26 RX ORDER — SODIUM CHLORIDE, SODIUM LACTATE, POTASSIUM CHLORIDE, CALCIUM CHLORIDE 600; 310; 30; 20 MG/100ML; MG/100ML; MG/100ML; MG/100ML
INJECTION, SOLUTION INTRAVENOUS CONTINUOUS
Status: DISCONTINUED | OUTPATIENT
Start: 2021-03-26 | End: 2021-03-26 | Stop reason: HOSPADM

## 2021-03-26 RX ORDER — HYDROMORPHONE HYDROCHLORIDE 1 MG/ML
0.1 INJECTION, SOLUTION INTRAMUSCULAR; INTRAVENOUS; SUBCUTANEOUS
Status: DISCONTINUED | OUTPATIENT
Start: 2021-03-26 | End: 2021-03-26 | Stop reason: HOSPADM

## 2021-03-26 RX ORDER — SODIUM CHLORIDE, SODIUM LACTATE, POTASSIUM CHLORIDE, CALCIUM CHLORIDE 600; 310; 30; 20 MG/100ML; MG/100ML; MG/100ML; MG/100ML
INJECTION, SOLUTION INTRAVENOUS CONTINUOUS
Status: ACTIVE | OUTPATIENT
Start: 2021-03-26 | End: 2021-03-26

## 2021-03-26 RX ORDER — OXYCODONE HYDROCHLORIDE AND ACETAMINOPHEN 5; 325 MG/1; MG/1
1 TABLET ORAL
Status: COMPLETED | OUTPATIENT
Start: 2021-03-26 | End: 2021-03-26

## 2021-03-26 RX ORDER — LIDOCAINE HYDROCHLORIDE 20 MG/ML
JELLY TOPICAL PRN
Status: DISCONTINUED | OUTPATIENT
Start: 2021-03-26 | End: 2021-03-26 | Stop reason: SURG

## 2021-03-26 RX ORDER — HALOPERIDOL 5 MG/ML
1 INJECTION INTRAMUSCULAR
Status: DISCONTINUED | OUTPATIENT
Start: 2021-03-26 | End: 2021-03-26 | Stop reason: HOSPADM

## 2021-03-26 RX ORDER — MEPERIDINE HYDROCHLORIDE 25 MG/ML
6.25 INJECTION INTRAMUSCULAR; INTRAVENOUS; SUBCUTANEOUS
Status: DISCONTINUED | OUTPATIENT
Start: 2021-03-26 | End: 2021-03-26 | Stop reason: HOSPADM

## 2021-03-26 RX ORDER — BUPIVACAINE HYDROCHLORIDE AND EPINEPHRINE 5; 5 MG/ML; UG/ML
INJECTION, SOLUTION EPIDURAL; INTRACAUDAL; PERINEURAL
Status: DISCONTINUED
Start: 2021-03-26 | End: 2021-03-26 | Stop reason: HOSPADM

## 2021-03-26 RX ORDER — DEXAMETHASONE SODIUM PHOSPHATE 4 MG/ML
INJECTION, SOLUTION INTRA-ARTICULAR; INTRALESIONAL; INTRAMUSCULAR; INTRAVENOUS; SOFT TISSUE PRN
Status: DISCONTINUED | OUTPATIENT
Start: 2021-03-26 | End: 2021-03-26 | Stop reason: SURG

## 2021-03-26 RX ORDER — HYDROMORPHONE HYDROCHLORIDE 1 MG/ML
0.4 INJECTION, SOLUTION INTRAMUSCULAR; INTRAVENOUS; SUBCUTANEOUS
Status: DISCONTINUED | OUTPATIENT
Start: 2021-03-26 | End: 2021-03-26 | Stop reason: HOSPADM

## 2021-03-26 RX ORDER — OXYCODONE HYDROCHLORIDE AND ACETAMINOPHEN 5; 325 MG/1; MG/1
2 TABLET ORAL
Status: COMPLETED | OUTPATIENT
Start: 2021-03-26 | End: 2021-03-26

## 2021-03-26 RX ORDER — ALPRAZOLAM 0.25 MG/1
0.25 TABLET ORAL
Status: COMPLETED | OUTPATIENT
Start: 2021-03-26 | End: 2021-03-26

## 2021-03-26 RX ORDER — ISOSULFAN BLUE 50 MG/5ML
INJECTION, SOLUTION SUBCUTANEOUS
Status: DISCONTINUED | OUTPATIENT
Start: 2021-03-26 | End: 2021-03-26 | Stop reason: HOSPADM

## 2021-03-26 RX ORDER — HYDROCODONE BITARTRATE AND ACETAMINOPHEN 5; 325 MG/1; MG/1
1-2 TABLET ORAL EVERY 4 HOURS PRN
Qty: 30 TABLET | Refills: 0 | Status: SHIPPED | OUTPATIENT
Start: 2021-03-26 | End: 2021-04-02

## 2021-03-26 RX ORDER — ONDANSETRON 2 MG/ML
4 INJECTION INTRAMUSCULAR; INTRAVENOUS
Status: DISCONTINUED | OUTPATIENT
Start: 2021-03-26 | End: 2021-03-26 | Stop reason: HOSPADM

## 2021-03-26 RX ORDER — LIDOCAINE AND PRILOCAINE 25; 25 MG/G; MG/G
CREAM TOPICAL ONCE
Status: COMPLETED | OUTPATIENT
Start: 2021-03-26 | End: 2021-03-26

## 2021-03-26 RX ORDER — LIDOCAINE HYDROCHLORIDE 20 MG/ML
INJECTION, SOLUTION EPIDURAL; INFILTRATION; INTRACAUDAL; PERINEURAL PRN
Status: DISCONTINUED | OUTPATIENT
Start: 2021-03-26 | End: 2021-03-26 | Stop reason: SURG

## 2021-03-26 RX ORDER — BUPIVACAINE HYDROCHLORIDE AND EPINEPHRINE 5; 5 MG/ML; UG/ML
INJECTION, SOLUTION PERINEURAL
Status: DISCONTINUED | OUTPATIENT
Start: 2021-03-26 | End: 2021-03-26 | Stop reason: HOSPADM

## 2021-03-26 RX ORDER — ROCURONIUM BROMIDE 10 MG/ML
INJECTION, SOLUTION INTRAVENOUS PRN
Status: DISCONTINUED | OUTPATIENT
Start: 2021-03-26 | End: 2021-03-26 | Stop reason: SURG

## 2021-03-26 RX ORDER — MIDAZOLAM HYDROCHLORIDE 1 MG/ML
INJECTION INTRAMUSCULAR; INTRAVENOUS PRN
Status: DISCONTINUED | OUTPATIENT
Start: 2021-03-26 | End: 2021-03-26 | Stop reason: SURG

## 2021-03-26 RX ORDER — CEFAZOLIN SODIUM 1 G/3ML
INJECTION, POWDER, FOR SOLUTION INTRAMUSCULAR; INTRAVENOUS PRN
Status: DISCONTINUED | OUTPATIENT
Start: 2021-03-26 | End: 2021-03-26 | Stop reason: SURG

## 2021-03-26 RX ADMIN — POVIDONE IODINE 15 ML: 100 SOLUTION TOPICAL at 08:24

## 2021-03-26 RX ADMIN — EPHEDRINE SULFATE 10 MG: 50 INJECTION, SOLUTION INTRAVENOUS at 11:03

## 2021-03-26 RX ADMIN — SUGAMMADEX 200 MG: 100 INJECTION, SOLUTION INTRAVENOUS at 11:23

## 2021-03-26 RX ADMIN — PROPOFOL 150 MG: 10 INJECTION, EMULSION INTRAVENOUS at 10:51

## 2021-03-26 RX ADMIN — FENTANYL CITRATE 50 MCG: 50 INJECTION, SOLUTION INTRAMUSCULAR; INTRAVENOUS at 11:55

## 2021-03-26 RX ADMIN — FENTANYL CITRATE 100 MCG: 50 INJECTION, SOLUTION INTRAMUSCULAR; INTRAVENOUS at 10:50

## 2021-03-26 RX ADMIN — LIDOCAINE HYDROCHLORIDE 3 ML: 20 JELLY TOPICAL at 10:52

## 2021-03-26 RX ADMIN — SODIUM CHLORIDE, POTASSIUM CHLORIDE, SODIUM LACTATE AND CALCIUM CHLORIDE: 600; 310; 30; 20 INJECTION, SOLUTION INTRAVENOUS at 10:46

## 2021-03-26 RX ADMIN — ROCURONIUM BROMIDE 50 MG: 10 INJECTION, SOLUTION INTRAVENOUS at 10:51

## 2021-03-26 RX ADMIN — CEFAZOLIN 2 G: 330 INJECTION, POWDER, FOR SOLUTION INTRAMUSCULAR; INTRAVENOUS at 10:50

## 2021-03-26 RX ADMIN — ONDANSETRON 4 MG: 2 INJECTION INTRAMUSCULAR; INTRAVENOUS at 11:23

## 2021-03-26 RX ADMIN — FENTANYL CITRATE 50 MCG: 50 INJECTION, SOLUTION INTRAMUSCULAR; INTRAVENOUS at 11:19

## 2021-03-26 RX ADMIN — DEXAMETHASONE SODIUM PHOSPHATE 8 MG: 4 INJECTION, SOLUTION INTRA-ARTICULAR; INTRALESIONAL; INTRAMUSCULAR; INTRAVENOUS; SOFT TISSUE at 10:56

## 2021-03-26 RX ADMIN — LIDOCAINE HYDROCHLORIDE 100 MG: 20 INJECTION, SOLUTION EPIDURAL; INFILTRATION; INTRACAUDAL at 10:50

## 2021-03-26 RX ADMIN — FENTANYL CITRATE 25 MCG: 50 INJECTION, SOLUTION INTRAMUSCULAR; INTRAVENOUS at 12:41

## 2021-03-26 RX ADMIN — OXYCODONE HYDROCHLORIDE AND ACETAMINOPHEN 2 TABLET: 5; 325 TABLET ORAL at 12:04

## 2021-03-26 RX ADMIN — LIDOCAINE AND PRILOCAINE 2.5 %: 25; 25 CREAM TOPICAL at 08:24

## 2021-03-26 RX ADMIN — ALPRAZOLAM 0.25 MG: 0.25 TABLET ORAL at 08:23

## 2021-03-26 RX ADMIN — MIDAZOLAM HYDROCHLORIDE 2 MG: 1 INJECTION, SOLUTION INTRAMUSCULAR; INTRAVENOUS at 10:47

## 2021-03-26 RX ADMIN — HYDROMORPHONE HYDROCHLORIDE 0.2 MG: 1 INJECTION, SOLUTION INTRAMUSCULAR; INTRAVENOUS; SUBCUTANEOUS at 13:21

## 2021-03-26 RX ADMIN — FENTANYL CITRATE 25 MCG: 50 INJECTION, SOLUTION INTRAMUSCULAR; INTRAVENOUS at 11:47

## 2021-03-26 ASSESSMENT — PAIN DESCRIPTION - PAIN TYPE
TYPE: SURGICAL PAIN

## 2021-03-26 ASSESSMENT — PAIN SCALES - GENERAL: PAIN_LEVEL: 3

## 2021-03-26 NOTE — OP REPORT
Operative Report    Date: 3/26/2021    Surgeon: Janice Knowles M.D.    Assistant: Ortgea MORRIS    My assistant, ADRIENNE Pederson, was medically necessary for this procedure. He injected the patient with 5 cc of methylene blue in order to facilitate identification of the sentinel lymph nodes, retracted the tissues necessary so I could perform the sentinel lymph node biopsy, assisted me in achieving hemostasis, and assisted in incisional closure.     Anesthesiologist: Slick WILSON     Pre-operative Diagnosis:  C50.211 Malignant neoplasm of upper-inner quadrant of right female breast    Post-operative Diagnosis: same     Procedure: RIGHT breast ESTEBAN localized partial mastectomy, RIGHT axillary sentinel lymph node biopsy, injection for identification of sentinel lymph node    77441 Mastectomy, partial   65814 Biopsy or excision of lymph node(s); open, deep axillary node(s)  97908 Intraoperative identification (eg, mapping) of sentinel lymph node(s) includes injection of non-radioactive dye    Findings: ESTEBAN and biopsy clips within excised specimen.    Procedure in detail: The patient was identified in the pre-operative holding area and brought to the operating room. Prior to the procedure, she underwent ESTEBAN  localization with radiology due to the non-palpable nature of the lesion. As well, she underwent radionucleotide injection by nuclear medicine.    Correct side and site were identified. Pre-operative antibiotics of ancef were administered prior to the procedure. Anesthesia was smoothly induced.    I injected 5 cc of methylene blue under the areola of the right breast, and the breast was then massaged briefly. The patient was then prepped and draped in the usual sterile fashion.    I then turned my attention to the partial mastectomy. The skin was infiltrated with local anesthetic and a curvilinear incision was made in the upper inner quadrant. The breast tissue was grasped with Allis clamps and a core of  tissue was removed around theSAVI. The specimen was then completely removed, marked with suture for orientation, and the clip, mass and shaina within the excised specimen.    Additional tissue from the superior, inferior, deep, medial and lateral margins were excised, marked for new margin, and sent for permanent pathology. eticulous hemostasis was achieved with electrocautery. The area was irrigated and suctioned. The skin was closed in two layers with vicryl and monocryl.     I proceeded with the sentinel lymph node biopsy portion of the procedure. A transverse incision was made in the lower end of the axilla after anesthetizing the skin. The incision was carried deeper into the axillary tissue and a blue-stained lymphatic was identified and traced distally to a  lymph node, which was partially blue stained and highly radioactive. This was dissected free from its surrounding tissue.     There was a second blue-stained node found deep  to that, which also was radioactive. This was sent to pathology for immediate evaluation as well. The residual radioactivity in the surgical bed was <10% of the initial count. These were sent to pathology.    After ensuring hemostasis, and irrigating the wound, I then closed the wound in two layers with vicryl and monocryl.     Sterile dressings were applied.     The patient was awakened from anesthetic, and was taken to the recovery room in stable condition.    Sponge and needle counts were correct at the end of the case.     Specimen:   1. right axillary sentinel lymph node tissue  2. right breast partial mastecomy    EBL: minimal    Dispo: stable, extubated, to PACU    Janice Knowles M.D.  [facility]  [phone]

## 2021-03-26 NOTE — OR NURSING
1135 Pt arrived to recovery from OR. Oral airway in place, removed when patient arrived. Receiving O2 7L via mask.     1147 Pt awake. O2 decreased to 3L via mask. 25mcg IV fentanyl given for pain 8/10.     1155 O2 turned off. SpO2 stable on room air. 50mcg IV fentanyl given for pain 9/10.    1204 Pt tolerating PO intake. 10/650mg PO percocet given for pain 9/10    1209 2L via nasal cannula applied.    1222 O2 turned off. SpO2 stable on room air. Phase II criteria met.    1241 25mcg IV fentanyl given for pain 8/10.    1250 Pt reports pain as tolerable    1321 0.2mg IV dilauded given for pain.     1335 Put sitting at bedside. Dressed with assistance. Up to chair. PIV removed with tip intact    1345 Pt up to bathroom via wheelchair. Able to void without difficulty    1357 Report to Cele IRENE.

## 2021-03-26 NOTE — PROGRESS NOTES
Discharge Instructions for Lumpectomy and [place] Lymph Node Biospy:    On the day of surgery we will be removing the lump of your breast cancer (lumpectomy) and through a separate incision under your arm we will be taking out the 2-3 lymph nodes that drain your breast (sentinel lymph node dissection).    Wound Care  1. You will have 2 incisions: 1) on your breast (lumpectomy) and 2) under your arm (sentinel lymph node biopsy).  2. All of your stitches are buried under the skin and dissolveable. There are no sutures to remove.   3. You can shower the day after your surgery and get your wound wet (it will be sealed by the waterproof dressings)  4. You may have some bruising after surgery. This is normal.  5. There may be a small amount of drainage from your wounds, this is usually normal and nothing to worry about. Place a dry gauze or bandage (available at any drug store) on the wound to absorb any drainage.  6. You can remove the dressing 2 days after surgery.     For Pain  1. Wear your bra as much as possible including to bed. The less your breast moves the less it will hurt.  2. You may take Tylenol or Advil every 4-6 hours as directed on the bottle.  3. You will be given a prescription for more severe pain. You can use it as needed.    Work  1. If you would like, you may return to work the day after your biopsy.  2. If you need a work excuse for the day of surgery or for any days thereafter, please let us know.    When to call the doctor  1. If you have severe, uncontrolled pain at the biopsy site.  2. If you run at fever of 100.5?F or higher within a few days of the biopsy.  3. If you have a large amount of drainage that is soaking the bandage more than once a day.  4. If the area around your wound becomes red/inflamed.  5. Make sure you schedule and attend all follow-up visits with your doctor. You should have a follow up appointment in about a week after surgery.    If you have any additional questions,  please do not hesitate to call the office and speak to either myself or the physician on call.    Office address:  [address] [place] [address] [id number]  [place] [address]        Janice Knowles MD  [facility]  [phone]

## 2021-03-26 NOTE — ANESTHESIA PREPROCEDURE EVALUATION
Right breast cancer    Relevant Problems   PULMONARY   (+) Asthma      NEURO   (+) History of suicide attempt      CARDIAC   (+) Essential hypertension      GI   (+) GERD (gastroesophageal reflux disease)         (+) Hepatitis C      Other   (+) Arthritis   (+) Bipolar 2 disorder (HCC)   (+) Elevated liver enzymes   (+) Fibromyalgia   (+) PTSD (post-traumatic stress disorder)   (+) Tobacco use   history of EtOH abuse  GERD-Frequent symptoms    Physical Exam    Airway   Mallampati: II  TM distance: >3 FB  Neck ROM: full       Cardiovascular - normal exam  Rhythm: regular  Rate: normal  (-) murmur     Dental - normal exam      Very poor dentition   Pulmonary - normal exam  Breath sounds clear to auscultation     Abdominal    Neurological - normal exam               Anesthesia Plan    ASA 3       Plan - general       Airway plan will be LMA          Induction: intravenous    Postoperative Plan: Postoperative administration of opioids is intended.    Pertinent diagnostic labs and testing reviewed    Informed Consent:    Anesthetic plan and risks discussed with patient.    Use of blood products discussed with: patient whom consented to blood products.

## 2021-03-26 NOTE — ANESTHESIA POSTPROCEDURE EVALUATION
Patient: Ashleigh Marquis    Procedure Summary     Date: 03/26/21 Room / Location: [facility] ROOM 23 / SURGERY SAME DAY [facility]    Anesthesia Start: 1046 Anesthesia Stop: 1136    Procedures:       MASTECTOMY, PARTIAL - ESTEBAN LOCALIZED (Right Breast)      LYMPHADENECTOMY, AXILLARY. (Right Axilla) Diagnosis: (BREAST CANCER OF UPPER-INNER QUADRANT OF RIGHT FEMALE BREAST)    Surgeons: Janice Knowles M.D. Responsible Provider: Matt Cowart M.D.    Anesthesia Type: general ASA Status: 3          Final Anesthesia Type: general  Last vitals  BP   Blood Pressure: (!) 167/104    Temp   36.4 °C (97.5 °F)    Pulse   90   Resp   20    SpO2   94 %      Anesthesia Post Evaluation    Patient location during evaluation: PACU  Patient participation: complete - patient participated  Level of consciousness: awake and alert  Pain score: 3    Airway patency: patent  Anesthetic complications: no  Cardiovascular status: hemodynamically stable  Respiratory status: acceptable  Hydration status: euvolemic    PONV: none          No complications documented.     Nurse Pain Score: 3 (NPRS)

## 2021-03-26 NOTE — ANESTHESIA TIME REPORT
Anesthesia Start and Stop Event Times     Date Time Event    3/26/2021 1039 Ready for Procedure     1046 Anesthesia Start     1136 Anesthesia Stop        Responsible Staff  03/26/21    Name Role Begin End    Matt Cowart M.D. Anesth 1046 1136        Preop Diagnosis (Free Text):  Pre-op Diagnosis     BREAST CANCER OF UPPER-INNER QUADRANT OF RIGHT FEMALE BREAST        Preop Diagnosis (Codes):    Post op Diagnosis  Breast cancer (HCC)      Premium Reason  Non-Premium    Comments:

## 2021-03-26 NOTE — DISCHARGE INSTRUCTIONS
ACTIVITY: Rest and take it easy for the first 24 hours.  A responsible adult is recommended to remain with you during that time.  It is normal to feel sleepy.  We encourage you to not do anything that requires balance, judgment or coordination.    MILD FLU-LIKE SYMPTOMS ARE NORMAL. YOU MAY EXPERIENCE GENERALIZED MUSCLE ACHES, THROAT IRRITATION, HEADACHE AND/OR SOME NAUSEA.    FOR 24 HOURS DO NOT:  Drive, operate machinery or run household appliances.  Drink beer or alcoholic beverages.   Make important decisions or sign legal documents.    SPECIAL INSTRUCTIONS:     Discharge Instructions for Lumpectomy and [place] Lymph Node Biospy:     On the day of surgery we will be removing the lump of your breast cancer (lumpectomy) and through a separate incision under your arm we will be taking out the 2-3 lymph nodes that drain your breast (sentinel lymph node dissection).     Wound Care  1. You will have 2 incisions: 1) on your breast (lumpectomy) and 2) under your arm (sentinel lymph node biopsy).  2. All of your stitches are buried under the skin and dissolveable. There are no sutures to remove.   3. You can shower the day after your surgery and get your wound wet (it will be sealed by the waterproof dressings)  4. You may have some bruising after surgery. This is normal.  5. There may be a small amount of drainage from your wounds, this is usually normal and nothing to worry about. Place a dry gauze or bandage (available at any drug store) on the wound to absorb any drainage.  6. You can remove the dressing 2 days after surgery.      For Pain  1. Wear your bra as much as possible including to bed. The less your breast moves the less it will hurt.  2. You may take Tylenol or Advil every 4-6 hours as directed on the bottle.  3. You will be given a prescription for more severe pain. You can use it as needed.     Work  1. If you would like, you may return to work the day after your biopsy.  2. If you need a work excuse  for the day of surgery or for any days thereafter, please let us know.     When to call the doctor  1. If you have severe, uncontrolled pain at the biopsy site.  2. If you run at fever of 100.5?F or higher within a few days of the biopsy.  3. If you have a large amount of drainage that is soaking the bandage more than once a day.  4. If the area around your wound becomes red/inflamed.  5. Make sure you schedule and attend all follow-up visits with your doctor. You should have a follow up appointment in about a week after surgery.     If you have any additional questions, please do not hesitate to call the office and speak to either myself or the physician on call.     Office address:  [address] [place] [address] [id number]  [place] [address]           Janice Knowles MD  [facility]  [phone]            DIET: To avoid nausea, slowly advance diet as tolerated, avoiding spicy or greasy foods for the first day.  Add more substantial food to your diet according to your physician's instructions. INCREASE FLUIDS AND FIBER TO AVOID CONSTIPATION.      FOLLOW-UP APPOINTMENT:  A follow-up appointment should be arranged with your doctor in 1-2 weeks; call to schedule.    You should CALL YOUR PHYSICIAN if you develop:  Fever greater than 101 degrees F.  Pain not relieved by medication, or persistent nausea or vomiting.  Excessive bleeding (blood soaking through dressing) or unexpected drainage from the wound.  Extreme redness or swelling around the incision site, drainage of pus or foul smelling drainage.  Inability to urinate or empty your bladder within 8 hours.  Problems with breathing or chest pain.    You should call 911 if you develop problems with breathing or chest pain.  If you are unable to contact your doctor or surgical center, you should go to the nearest emergency room or urgent care center.     If any questions arise, call your doctor.  If your doctor is not available, please feel free to call the Surgical Center  at [phone]. The Contact Center is open Monday through Friday 7AM to 5PM and may speak to a nurse at [phone], or toll free at [phone].     A registered nurse may call you a few days after your surgery to see how you are doing after your procedure.    MEDICATIONS: Resume taking daily medication.  Take prescribed pain medication with food.  If no medication is prescribed, you may take non-aspirin pain medication if needed.  PAIN MEDICATION CAN BE VERY CONSTIPATING.  Take a stool softener or laxative such as senokot, pericolace, or milk of magnesia if needed.    Prescription given for Norco.  Last pain medication given at 12pm. You may begin to take this medication at 4pm.    If your physician has prescribed pain medication that includes Acetaminophen (Tylenol), do not take additional Acetaminophen (Tylenol) while taking the prescribed medication.    Depression / Suicide Risk    As you are discharged from this [facility] facility, it is important to learn how to keep safe from harming yourself.    Recognize the warning signs:  · Abrupt changes in personality, positive or negative- including increase in energy   · Giving away possessions  · Change in eating patterns- significant weight changes-  positive or negative  · Change in sleeping patterns- unable to sleep or sleeping all the time   · Unwillingness or inability to communicate  · Depression  · Unusual sadness, discouragement and loneliness  · Talk of wanting to die  · Neglect of personal appearance   · Rebelliousness- reckless behavior  · Withdrawal from people/activities they love  · Confusion- inability to concentrate     If you or a loved one observes any of these behaviors or has concerns about self-harm, here's what you can do:  · Talk about it- your feelings and reasons for harming yourself  · Remove any means that you might use to hurt yourself (examples: pills, rope, extension cords, firearm)  · Get professional help from the community  (Mental Health, Substance Abuse, psychological counseling)  · Do not be alone:Call your Safe Contact- someone whom you trust who will be there for you.  · Call your local CRISIS HOTLINE 458-8034 or 373-807-5631  · Call your local Children's Mobile Crisis Response Team [place] (741) 051-9658 or www.[employer]  · Call the toll free National Suicide Prevention Hotlines   · National Suicide Prevention Lifeline 205-538-BPLP (9542)  · National Hope Line Network 800-SUICIDE (322-9748)

## 2021-03-29 ENCOUNTER — PATIENT OUTREACH (OUTPATIENT)
Dept: OTHER | Facility: MEDICAL CENTER | Age: 61
End: 2021-03-29

## 2021-03-29 DIAGNOSIS — Z65.8 PSYCHOSOCIAL DISTRESS: ICD-10-CM
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
## 2021-04-15 DIAGNOSIS — Z17.1 MALIGNANT NEOPLASM OF CENTRAL PORTION OF RIGHT BREAST IN FEMALE, ESTROGEN RECEPTOR NEGATIVE (HCC): ICD-10-CM

## 2021-04-15 DIAGNOSIS — C50.111 MALIGNANT NEOPLASM OF CENTRAL PORTION OF RIGHT BREAST IN FEMALE, ESTROGEN RECEPTOR NEGATIVE (HCC): ICD-10-CM

## 2021-04-15 PROBLEM — C50.119 MALIGNANT NEOPLASM OF CENTRAL PORTION OF BREAST IN FEMALE, ESTROGEN RECEPTOR NEGATIVE (HCC): Status: ACTIVE | Noted: 2021-04-15

## 2021-04-15 RX ORDER — PROCHLORPERAZINE MALEATE 10 MG
10 TABLET ORAL EVERY 6 HOURS PRN
Status: CANCELLED | OUTPATIENT
Start: 2021-04-26

## 2021-04-15 RX ORDER — HEPARIN SODIUM (PORCINE) LOCK FLUSH IV SOLN 100 UNIT/ML 100 UNIT/ML
500 SOLUTION INTRAVENOUS PRN
Status: CANCELLED | OUTPATIENT
Start: 2021-04-26

## 2021-04-15 RX ORDER — ONDANSETRON 2 MG/ML
4 INJECTION INTRAMUSCULAR; INTRAVENOUS PRN
Status: CANCELLED | OUTPATIENT
Start: 2021-04-26

## 2021-04-15 RX ORDER — ONDANSETRON 8 MG/1
8 TABLET, ORALLY DISINTEGRATING ORAL PRN
Status: CANCELLED | OUTPATIENT
Start: 2021-04-26

## 2021-04-15 RX ORDER — 0.9 % SODIUM CHLORIDE 0.9 %
VIAL (ML) INJECTION PRN
Status: CANCELLED | OUTPATIENT
Start: 2021-04-26

## 2021-04-15 RX ORDER — 0.9 % SODIUM CHLORIDE 0.9 %
10 VIAL (ML) INJECTION PRN
Status: CANCELLED | OUTPATIENT
Start: 2021-04-26

## 2021-04-15 RX ORDER — 0.9 % SODIUM CHLORIDE 0.9 %
3 VIAL (ML) INJECTION PRN
Status: CANCELLED | OUTPATIENT
Start: 2021-04-26

## 2021-04-15 RX ORDER — 0.9 % SODIUM CHLORIDE 0.9 %
10 VIAL (ML) INJECTION PRN
Status: CANCELLED | OUTPATIENT
Start: 2021-04-25

## 2021-04-15 RX ORDER — SODIUM CHLORIDE 9 MG/ML
INJECTION, SOLUTION INTRAVENOUS CONTINUOUS
Status: CANCELLED | OUTPATIENT
Start: 2021-04-26

## 2021-04-15 RX ORDER — HEPARIN SODIUM (PORCINE) LOCK FLUSH IV SOLN 100 UNIT/ML 100 UNIT/ML
500 SOLUTION INTRAVENOUS PRN
Status: CANCELLED | OUTPATIENT
Start: 2021-04-25

## 2021-04-15 RX ORDER — 0.9 % SODIUM CHLORIDE 0.9 %
VIAL (ML) INJECTION PRN
Status: CANCELLED | OUTPATIENT
Start: 2021-04-25

## 2021-04-15 RX ORDER — 0.9 % SODIUM CHLORIDE 0.9 %
3 VIAL (ML) INJECTION PRN
Status: CANCELLED | OUTPATIENT
Start: 2021-04-25

## 2021-04-21 ENCOUNTER — HOSPITAL ENCOUNTER (OUTPATIENT)
Dept: RADIOLOGY | Facility: MEDICAL CENTER | Age: 61
End: 2021-04-21
Payer: MEDICAID

## 2021-04-23 ENCOUNTER — APPOINTMENT (OUTPATIENT)
Dept: RADIATION ONCOLOGY | Facility: MEDICAL CENTER | Age: 61
End: 2021-04-23
Attending: RADIOLOGY
Payer: MEDICAID

## 2021-04-25 NOTE — PROGRESS NOTES
"Pharmacy Chemotherapy Calculation    Patient Name: Ashleigh Marquis   Dx:Triple Negative Breast Cancer  Protocol: TC     *Dosing Reference*  DOCEtaxel 75 mg/m2 IV over 60 minutes on Day 1  Cyclophosphamide 600 mg/m2 IV over 30 minutes on Day 1   21 day cycle for 4 cycles   NCCN Guidelines for Breast Cancer V.2.2021  Sawyer OROZCO, et al. J Clin Oncol. 2009;27(8):177-83    Allergies:  Sulfa drugs and Toradol     /79   Pulse 90   Temp 36.1 °C (97 °F) (Temporal)   Resp 18   Ht 1.61 m (5' 3.39\")   Wt 84.2 kg (185 lb 10 oz)   LMP 11/02/2015   SpO2 98%   BMI 32.48 kg/m²  Body surface area is 1.94 meters squared.    Labs 4/26/21:  ANC~ 3690 Plt = 351k   Hgb = 10.6     SCr = 0.65 mg/dL CrCl ~ 125 mL/min   AST/ALT/ALK = 26/15/143 TBili = 0.3      Drug Order   (Drug name, dose, route, IV Fluid & volume, frequency, number of doses) Cycle 1      Previous treatment: n/a     Medication = DOCEtaxel (Taxotere)  Base Dose= 75 mg/m2  Calc Dose: Base Dose x 1.94 m2 = 145.5 mg  Final Dose = 145.6 mg  Route = IV  Fluid & Volume =  mL  Admin Duration = Over 60 minutes          <10% difference, okay to treat with final dose   Medication = Cyclophosphamide (Cytoxan)  Base Dose= 600 mg/m2  Calc Dose: Base Dose x 1.94 m2 = 1164 mg  Final Dose = 1164 mg  Route = IV  Fluid & Volume =  mL  Admin Duration = Over 30 minutes          <10% difference, okay to treat with final dose     By my signature below, I confirm this process was performed independently with the BSA and all final chemotherapy dosing calculations congruent. I have reviewed the above chemotherapy order and that my calculation of the final dose and BSA (when applicable) corroborate those calculations of the  pharmacist. Discrepancies of 10% or greater in the written dose have been addressed and documented within the [facility] Progress notes.    Kade Clements, PharmD      "

## 2021-04-26 ENCOUNTER — OUTPATIENT INFUSION SERVICES (OUTPATIENT)
Dept: ONCOLOGY | Facility: MEDICAL CENTER | Age: 61
End: 2021-04-26
Attending: INTERNAL MEDICINE
Payer: MEDICAID

## 2021-04-26 ENCOUNTER — PATIENT OUTREACH (OUTPATIENT)
Dept: OTHER | Facility: MEDICAL CENTER | Age: 61
End: 2021-04-26

## 2021-04-26 VITALS
TEMPERATURE: 97 F | HEART RATE: 90 BPM | WEIGHT: 185.63 LBS | DIASTOLIC BLOOD PRESSURE: 79 MMHG | SYSTOLIC BLOOD PRESSURE: 152 MMHG | RESPIRATION RATE: 18 BRPM | BODY MASS INDEX: 32.89 KG/M2 | HEIGHT: 63 IN | OXYGEN SATURATION: 98 %

## 2021-04-26 DIAGNOSIS — C50.111 MALIGNANT NEOPLASM OF CENTRAL PORTION OF RIGHT BREAST IN FEMALE, ESTROGEN RECEPTOR NEGATIVE (HCC): ICD-10-CM

## 2021-04-26 DIAGNOSIS — Z17.1 MALIGNANT NEOPLASM OF CENTRAL PORTION OF RIGHT BREAST IN FEMALE, ESTROGEN RECEPTOR NEGATIVE (HCC): ICD-10-CM

## 2021-04-26 LAB
ALBUMIN SERPL BCP-MCNC: 4.1 G/DL (ref 3.2–4.9)
ALBUMIN/GLOB SERPL: 1.2 G/DL
ALP SERPL-CCNC: 143 U/L (ref 30–99)
ALT SERPL-CCNC: 15 U/L (ref 2–50)
ANION GAP SERPL CALC-SCNC: 8 MMOL/L (ref 7–16)
AST SERPL-CCNC: 26 U/L (ref 12–45)
BASOPHILS # BLD AUTO: 1.5 % (ref 0–1.8)
BASOPHILS # BLD: 0.09 K/UL (ref 0–0.12)
BILIRUB SERPL-MCNC: 0.3 MG/DL (ref 0.1–1.5)
BUN SERPL-MCNC: 14 MG/DL (ref 8–22)
CALCIUM SERPL-MCNC: 9.7 MG/DL (ref 8.5–10.5)
CHLORIDE SERPL-SCNC: 102 MMOL/L (ref 96–112)
CO2 SERPL-SCNC: 26 MMOL/L (ref 20–33)
CREAT SERPL-MCNC: 0.65 MG/DL (ref 0.5–1.4)
EOSINOPHIL # BLD AUTO: 0.07 K/UL (ref 0–0.51)
EOSINOPHIL NFR BLD: 1.2 % (ref 0–6.9)
ERYTHROCYTE [DISTWIDTH] IN BLOOD BY AUTOMATED COUNT: 45.1 FL (ref 35.9–50)
GLOBULIN SER CALC-MCNC: 3.4 G/DL (ref 1.9–3.5)
GLUCOSE SERPL-MCNC: 85 MG/DL (ref 65–99)
HCT VFR BLD AUTO: 34.3 % (ref 37–47)
HGB BLD-MCNC: 10.6 G/DL (ref 12–16)
IMM GRANULOCYTES # BLD AUTO: 0.02 K/UL (ref 0–0.11)
IMM GRANULOCYTES NFR BLD AUTO: 0.3 % (ref 0–0.9)
LYMPHOCYTES # BLD AUTO: 1.55 K/UL (ref 1–4.8)
LYMPHOCYTES NFR BLD: 25.5 % (ref 22–41)
MCH RBC QN AUTO: 26 PG (ref 27–33)
MCHC RBC AUTO-ENTMCNC: 30.9 G/DL (ref 33.6–35)
MCV RBC AUTO: 84.3 FL (ref 81.4–97.8)
MONOCYTES # BLD AUTO: 0.66 K/UL (ref 0–0.85)
MONOCYTES NFR BLD AUTO: 10.9 % (ref 0–13.4)
NEUTROPHILS # BLD AUTO: 3.69 K/UL (ref 2–7.15)
NEUTROPHILS NFR BLD: 60.6 % (ref 44–72)
NRBC # BLD AUTO: 0 K/UL
NRBC BLD-RTO: 0 /100 WBC
PLATELET # BLD AUTO: 351 K/UL (ref 164–446)
PMV BLD AUTO: 10.2 FL (ref 9–12.9)
POTASSIUM SERPL-SCNC: 4.1 MMOL/L (ref 3.6–5.5)
PROT SERPL-MCNC: 7.5 G/DL (ref 6–8.2)
RBC # BLD AUTO: 4.07 M/UL (ref 4.2–5.4)
SODIUM SERPL-SCNC: 136 MMOL/L (ref 135–145)
WBC # BLD AUTO: 6.1 K/UL (ref 4.8–10.8)

## 2021-04-26 PROCEDURE — 304540 HCHG NITRO SET VENT 2ND TUB

## 2021-04-26 PROCEDURE — 700105 HCHG RX REV CODE 258: Performed by: INTERNAL MEDICINE

## 2021-04-26 PROCEDURE — 700111 HCHG RX REV CODE 636 W/ 250 OVERRIDE (IP): Performed by: INTERNAL MEDICINE

## 2021-04-26 PROCEDURE — 96375 TX/PRO/DX INJ NEW DRUG ADDON: CPT

## 2021-04-26 PROCEDURE — 96413 CHEMO IV INFUSION 1 HR: CPT

## 2021-04-26 PROCEDURE — 700102 HCHG RX REV CODE 250 W/ 637 OVERRIDE(OP): Performed by: INTERNAL MEDICINE

## 2021-04-26 PROCEDURE — 96417 CHEMO IV INFUS EACH ADDL SEQ: CPT

## 2021-04-26 PROCEDURE — 85025 COMPLETE CBC W/AUTO DIFF WBC: CPT

## 2021-04-26 PROCEDURE — 80053 COMPREHEN METABOLIC PANEL: CPT

## 2021-04-26 PROCEDURE — A9270 NON-COVERED ITEM OR SERVICE: HCPCS | Performed by: INTERNAL MEDICINE

## 2021-04-26 RX ORDER — PROCHLORPERAZINE MALEATE 10 MG
10 TABLET ORAL 2 TIMES DAILY PRN
COMMUNITY
Start: 2021-04-23 | End: 2022-01-01

## 2021-04-26 RX ORDER — ACETAMINOPHEN AND CODEINE PHOSPHATE 120; 12 MG/5ML; MG/5ML
30 SOLUTION ORAL NIGHTLY PRN
COMMUNITY
Start: 2021-02-25 | End: 2022-01-01

## 2021-04-26 RX ORDER — NALOXONE HYDROCHLORIDE 4 MG/.1ML
SPRAY NASAL
Status: ON HOLD | COMMUNITY
Start: 2021-03-26 | End: 2022-01-01

## 2021-04-26 RX ORDER — LORATADINE 10 MG/1
TABLET ORAL
COMMUNITY
Start: 2021-04-23 | End: 2022-01-01

## 2021-04-26 RX ORDER — PROCHLORPERAZINE MALEATE 10 MG
10 TABLET ORAL EVERY 6 HOURS PRN
Status: DISCONTINUED | OUTPATIENT
Start: 2021-04-26 | End: 2021-04-26 | Stop reason: HOSPADM

## 2021-04-26 RX ORDER — ACETAMINOPHEN 325 MG/1
650 TABLET ORAL ONCE
Status: COMPLETED | OUTPATIENT
Start: 2021-04-26 | End: 2021-04-26

## 2021-04-26 RX ORDER — CHLORHEXIDINE GLUCONATE ORAL RINSE 1.2 MG/ML
15 SOLUTION DENTAL 2 TIMES DAILY
COMMUNITY
Start: 2021-03-10 | End: 2022-01-01

## 2021-04-26 RX ADMIN — DEXAMETHASONE SODIUM PHOSPHATE 12 MG: 4 INJECTION, SOLUTION INTRA-ARTICULAR; INTRALESIONAL; INTRAMUSCULAR; INTRAVENOUS; SOFT TISSUE at 12:30

## 2021-04-26 RX ADMIN — CYCLOPHOSPHAMIDE 1164 MG: 1 INJECTION, POWDER, FOR SOLUTION INTRAVENOUS; ORAL at 16:15

## 2021-04-26 RX ADMIN — ACETAMINOPHEN 650 MG: 325 TABLET ORAL at 15:56

## 2021-04-26 RX ADMIN — HYDROCORTISONE SODIUM SUCCINATE 100 MG: 100 INJECTION, POWDER, FOR SOLUTION INTRAMUSCULAR; INTRAVENOUS at 15:17

## 2021-04-26 RX ADMIN — ONDANSETRON 16 MG: 2 INJECTION INTRAMUSCULAR; INTRAVENOUS at 12:10

## 2021-04-26 RX ADMIN — PROCHLORPERAZINE MALEATE 10 MG: 10 TABLET ORAL at 14:59

## 2021-04-26 ASSESSMENT — PAIN DESCRIPTION - PAIN TYPE: TYPE: ACUTE PAIN

## 2021-04-26 ASSESSMENT — FIBROSIS 4 INDEX: FIB4 SCORE: 0.81

## 2021-04-26 NOTE — PROGRESS NOTES
Chemotherapy Verification - SECONDARY RN       Height = 63.39in  Weight = 84.2kg  BSA = 1.94m2       Medication: Taxotere  Dose: 75mg/m2  Calculated Dose: 145.5mg                             (In mg/m2, AUC, mg/kg)     Medication: Cytoxan  Dose: 600mg/m2  Calculated Dose: 1164mg                             (In mg/m2, AUC, mg/kg)      I confirm that this process was performed independently.

## 2021-04-26 NOTE — PROGRESS NOTES
Chemotherapy Verification - PRIMARY RN      Height = 161 cm  Weight = 84.2 kg  BSA = 1.94 m2       Medication: DOCEtaxel  Dose: 75 mg/m2  Calculated Dose: 145.5 mg                                 Medication: Cyclophosphamide  Dose: 600 mg/m2  Calculated Dose: 1164 mg                                    I confirm this process was performed independently with the BSA and all final chemotherapy dosing calculations congruent.  Any discrepancies of 10% or greater have been addressed with the chemotherapy pharmacist. The resolution of the discrepancy has been documented in the EPIC progress notes.

## 2021-04-26 NOTE — PROGRESS NOTES
On 4-26-21, Oncology Social Worker, Marcela Kam, contacted pt via phone. ANNABEL Kam introduced self as part of pt's social support team at [facility]. ANNABEL Kam inquired on pt's and/or families current needs and addressed distress score of 8, relating to food preferences, fears, nervousness, worry, and eating.   Pt reports continuing to stay at [facility] and struggles with her , Amina Pruett. Pt reports feeling uncertain if she can trust anyone to do what they say they will do. Pt reports hoping to move to another area instead of intake housing due to not being able to eat the Meals on Wheels food. Pt reports she can only eat smoothies and vegetables. ANNABEL Kam informed pt that a Dietitian will f/u with her to see other options for food while in the Shelter that is nutritional.   ANNABEL Kam inquired on pt's fears, worry, and nervousness. Pt reports her stress is due to not getting along with person's in her past housing and now with her SW at [facility]. Pt reports never knowing if she will be kicked out or not. ANNABEL Kam inquired on housing options. Pt reports she has a good friend, Angle, who helps her but she cannot stay with her. Pt reports filling out housing applications for her SW and hopes to get into housing again. Pt reports her SW at [facility] is trying to get pt to agree on skilled nursing as an option. Pt reports she does not believe this is a good fit for her.    ANNABEL Kam inquired on pt's BH. Pt reports she is taking all her medications, is not using any substances at this time, and is seeing her Psychiatrist.   ANNABEL Kam inquired on pt's financials. Pt reports she has money coming in with her SSDI, and does not wish to disclose more than that.   ANNABEL Kam inquired on other needs at this time. Pt reports wanting someone to make her SW at [facility] do her job and provide the Supervisor with her medical papers so she can stay there. ANNABEL Kam encouraged pt to fill out an ANASTACIO to speak with  her SW at [facility]. Pt declined at this time.  ANNABEL Kam thanked pt for her time, provided business card, and encouraged pt to contact for questions/concerns.

## 2021-04-26 NOTE — PROGRESS NOTES
"Pharmacy Chemotherapy Verification  Patient Name: Ashleigh Marquis   Dx: invasive ductal carcinoma  Protocol: TC     Cycle 1 Day 1  Previous treatment = n/a    Regimen:   Docetaxel (Taxotere) 75 mg/m2 IV over 30 min Day 1  Cyclophosphamide 600 mg/m2 IV over 30 minutes Day 1   Q21day cycles x4 cycles  *Dosing Reference*  Docetaxel With Cyclophosphamide Is Associated With an Overall Survival Benefit Compared With Doxorubicin and Cyclophosphamide: 7-Year Follow-Up of  Oncology Research Trial 9735.    Allergies:  Sulfa drugs and Toradol     /79   Pulse 90   Temp 36.1 °C (97 °F) (Temporal)   Resp 18   Ht 1.61 m (5' 3.39\")   Wt 84.2 kg (185 lb 10 oz)   LMP 11/02/2015   SpO2 98%   BMI 32.48 kg/m²  Body surface area is 1.94 meters squared.    Labs:  4/26/21  Meet treatment parameters    ER/LA neg  HER2 1+    Docetaxel (Taxotere) 75 mg/m2 x 1.94 m2 = 145.5 mg  OK to treat with final dose = 145.6 mg   Final [conc] ~ 0.57 mg/mL     Cyclophosphamide 600 mg/m2 x 1.94 m2 = 1164 mg  OK to treat with final dose  = 1164 mg       "

## 2021-04-27 ENCOUNTER — OUTPATIENT INFUSION SERVICES (OUTPATIENT)
Dept: ONCOLOGY | Facility: MEDICAL CENTER | Age: 61
End: 2021-04-27
Attending: RADIOLOGY
Payer: MEDICAID

## 2021-04-27 VITALS
TEMPERATURE: 98 F | HEART RATE: 84 BPM | SYSTOLIC BLOOD PRESSURE: 146 MMHG | BODY MASS INDEX: 33.87 KG/M2 | HEIGHT: 63 IN | OXYGEN SATURATION: 98 % | RESPIRATION RATE: 18 BRPM | WEIGHT: 191.14 LBS | DIASTOLIC BLOOD PRESSURE: 84 MMHG

## 2021-04-27 DIAGNOSIS — C50.111 MALIGNANT NEOPLASM OF CENTRAL PORTION OF RIGHT BREAST IN FEMALE, ESTROGEN RECEPTOR NEGATIVE (HCC): ICD-10-CM

## 2021-04-27 DIAGNOSIS — Z17.1 MALIGNANT NEOPLASM OF CENTRAL PORTION OF RIGHT BREAST IN FEMALE, ESTROGEN RECEPTOR NEGATIVE (HCC): ICD-10-CM

## 2021-04-27 PROCEDURE — 700111 HCHG RX REV CODE 636 W/ 250 OVERRIDE (IP): Performed by: INTERNAL MEDICINE

## 2021-04-27 PROCEDURE — 96372 THER/PROPH/DIAG INJ SC/IM: CPT

## 2021-04-27 RX ORDER — DIPHENHYDRAMINE HCL 25 MG/1
CAPSULE ORAL
Status: ON HOLD | COMMUNITY
Start: 2021-03-03 | End: 2022-01-01

## 2021-04-27 RX ADMIN — PEGFILGRASTIM-CBQV 6 MG: 6 INJECTION, SOLUTION SUBCUTANEOUS at 15:13

## 2021-04-27 ASSESSMENT — PAIN DESCRIPTION - PAIN TYPE: TYPE: ACUTE PAIN

## 2021-04-27 ASSESSMENT — FIBROSIS 4 INDEX: FIB4 SCORE: 1.11

## 2021-04-27 NOTE — PROGRESS NOTES
Pt ambulatory to [facility] for C1 D2 post-chemo Udenyca injection.  Pt w/ no s/s of infection, pt has no complaints at this time.  Udenyca injected into LUQ of abd, band-aid placed.  Pt left on foot in care of self in NAD.  Confirmed pt's next appt.

## 2021-04-27 NOTE — PROGRESS NOTES
"Pt arrived ambulatory to IS for Day 1 cycle 1 Taxotere/ Cyclophosphamide.   Plan of care discussed with patient and she verbalized understanding and agreement.  Pt was feeling nervous and anxious, emotional support provided.      PIV placed, brisk blood return observed, labs collected and reviewed. Pt within parameters to receive treatment today.  Pre-medications administered per MAR.  Taxotere administered per titration staring at 50 ml/hr.  At 1445, rate at 200 ml per hour, pt reported not feeling well.  She reported nausea and became \"hot and sweaty\".  Taxotere infusion paused.  Compazine administered for nausea.  Order received from Dr Valle for solu-cortef for possible reaction, administered per MAR.      Taxotere restarted at 1615 at 150 ml/hr until completed, pt tolerated well, no s/s adverse reaction observed or verbalized.  Cyclophosphamide administered per MAR, pt tolerated well.  PIV flushed and removed, gauze and coban to site.  Pt discharged in no apparent distress, returns tomorrow for Udenyca injection.    "

## 2021-04-29 ENCOUNTER — PATIENT OUTREACH (OUTPATIENT)
Dept: OTHER | Facility: MEDICAL CENTER | Age: 61
End: 2021-04-29

## 2021-04-29 NOTE — PROGRESS NOTES
On April 29, 2021, Oncology Social Worker Kenzie Lay attempted telephone contact with pt.  ANNABEL Lay left voicemail message for pt. requesting pt. call back at earliest convenience.  ANNABEL Lay left contact information in voicemail message.      ANNABEL Lay contacted pt. back after leaving voicemail message to let her know to disregard voicemail message from ANNABEL Lay.  Pt. answered and was aware of ANNABEL Kam.  ANNABEL Lay apologized for confusion.  Pt. shared it was not a problem and thanked ANNABEL Lay for calling.

## 2021-04-30 ENCOUNTER — APPOINTMENT (OUTPATIENT)
Dept: RADIATION ONCOLOGY | Facility: MEDICAL CENTER | Age: 61
End: 2021-04-30
Attending: RADIOLOGY
Payer: MEDICAID

## 2021-05-07 ENCOUNTER — TELEPHONE (OUTPATIENT)
Dept: RADIATION ONCOLOGY | Facility: MEDICAL CENTER | Age: 61
End: 2021-05-07

## 2021-05-07 NOTE — TELEPHONE ENCOUNTER
"Nutrition Services: RD Consultation/ New Chemo Start  Ashleigh Marquis is a 60 y.o. female with diagnosis of cervical cancer.    Past Medical History:   Diagnosis Date   • Alcoholism ([facility])    • Anemia     pt states she doesn't think it is a current issue   • Anxiety    • Anxiety and depression 03/23/2021   • Arthritis     osteo-legs, knees   • ASTHMA    • Cancer (HCC) 1981    cervical   • Chronic low back pain    • Congestive heart failure (HCC)    • Dental disorder     upper and lower dentures   • Depression    • EtOH dependence ([facility])    • Fall     alcohol related   • GERD (gastroesophageal reflux disease)    • Heart burn    • HTN    • Hypertension    • Indigestion     GERD   • Muscle disorder    • OSTEOPOROSIS    • Other specified symptom associated with female genital organs     \"I have fibroids bad\"\"   • Pain 03/23/2021    breast, legs, joints   • Psychiatric disorder    • PTSD (post-traumatic stress disorder)    • Renal disorder     Hx of stones   • Seizure ([facility])     several years ago r/t alcohol withdrawl   • Seizure ([facility]) 11/03/2020    last seizure was 11/2020   • Ulcer    • Vitamin D deficiency        Pt was a no call no show for XRT today. RD called pt and pt answered for brief discussion.     Pt immediately focused on skin being dry on face, arms, legs. RD asked if pt had discussed with PCP and when she stated no, RD encouraged f/u with primary MD to address concerns. Pt reports no appetite and only eats one meal per day, did not answer as to what is in meal she eats. States she does not like what meals on wheels sends her such as fish and rice. When asked about any weight loss, pt not answering. Overall pt not focused on nutrition and more focused on dry skin and then needed to go to the bathroom.    Assessment:  • Pertinent Labs 4/26: Alk phos 143  • Pertinent Meds: compazine, claritin, diphenhist, narcan  • Weight: 197 lbs on 4/27/21  • Height: 5' 3\"  • BMI: 33.45    Weight History from Chart:  185 " lbs on 4/26/21  150 lbs on 11/3/20    Plan/Recommendations:  • Unable to discuss much with pt, will attempt to f/u in person as able.  • Encouraged PO intake as able with pt.    Pt reports understanding and was not receptive to information provided. RD provided contact information. Encouraged pt to reach out as questions/concerns arise.     RD following and to make further recommendations as indicated.  [phone]

## 2021-05-17 ENCOUNTER — DOCUMENTATION (OUTPATIENT)
Dept: ONCOLOGY | Facility: MEDICAL CENTER | Age: 61
End: 2021-05-17

## 2021-05-17 ENCOUNTER — OUTPATIENT INFUSION SERVICES (OUTPATIENT)
Dept: ONCOLOGY | Facility: MEDICAL CENTER | Age: 61
End: 2021-05-17
Attending: INTERNAL MEDICINE
Payer: MEDICAID

## 2021-05-17 VITALS
RESPIRATION RATE: 18 BRPM | WEIGHT: 190.26 LBS | TEMPERATURE: 97.5 F | HEIGHT: 64 IN | HEART RATE: 90 BPM | DIASTOLIC BLOOD PRESSURE: 61 MMHG | OXYGEN SATURATION: 99 % | BODY MASS INDEX: 32.48 KG/M2 | SYSTOLIC BLOOD PRESSURE: 138 MMHG

## 2021-05-17 DIAGNOSIS — D50.9 IRON DEFICIENCY ANEMIA, UNSPECIFIED IRON DEFICIENCY ANEMIA TYPE: ICD-10-CM

## 2021-05-17 DIAGNOSIS — Z17.1 MALIGNANT NEOPLASM OF CENTRAL PORTION OF RIGHT BREAST IN FEMALE, ESTROGEN RECEPTOR NEGATIVE (HCC): ICD-10-CM

## 2021-05-17 DIAGNOSIS — C50.111 MALIGNANT NEOPLASM OF CENTRAL PORTION OF RIGHT BREAST IN FEMALE, ESTROGEN RECEPTOR NEGATIVE (HCC): ICD-10-CM

## 2021-05-17 LAB
ALBUMIN SERPL BCP-MCNC: 4 G/DL (ref 3.2–4.9)
ALBUMIN/GLOB SERPL: 1.3 G/DL
ALP SERPL-CCNC: 120 U/L (ref 30–99)
ALT SERPL-CCNC: 14 U/L (ref 2–50)
ANION GAP SERPL CALC-SCNC: 12 MMOL/L (ref 7–16)
AST SERPL-CCNC: 20 U/L (ref 12–45)
BASOPHILS # BLD AUTO: 1.7 % (ref 0–1.8)
BASOPHILS # BLD: 0.15 K/UL (ref 0–0.12)
BILIRUB SERPL-MCNC: 0.2 MG/DL (ref 0.1–1.5)
BUN SERPL-MCNC: 14 MG/DL (ref 8–22)
CALCIUM SERPL-MCNC: 9.5 MG/DL (ref 8.5–10.5)
CHLORIDE SERPL-SCNC: 105 MMOL/L (ref 96–112)
CO2 SERPL-SCNC: 22 MMOL/L (ref 20–33)
CREAT SERPL-MCNC: 0.7 MG/DL (ref 0.5–1.4)
EOSINOPHIL # BLD AUTO: 0.15 K/UL (ref 0–0.51)
EOSINOPHIL NFR BLD: 1.7 % (ref 0–6.9)
ERYTHROCYTE [DISTWIDTH] IN BLOOD BY AUTOMATED COUNT: 50.2 FL (ref 35.9–50)
FERRITIN SERPL-MCNC: 20.8 NG/ML (ref 10–291)
GLOBULIN SER CALC-MCNC: 3.1 G/DL (ref 1.9–3.5)
GLUCOSE SERPL-MCNC: 126 MG/DL (ref 65–99)
HCT VFR BLD AUTO: 32 % (ref 37–47)
HGB BLD-MCNC: 9.7 G/DL (ref 12–16)
IMM GRANULOCYTES # BLD AUTO: 0.04 K/UL (ref 0–0.11)
IMM GRANULOCYTES NFR BLD AUTO: 0.5 % (ref 0–0.9)
IRON SATN MFR SERPL: 10 % (ref 15–55)
IRON SERPL-MCNC: 34 UG/DL (ref 40–170)
LYMPHOCYTES # BLD AUTO: 0.79 K/UL (ref 1–4.8)
LYMPHOCYTES NFR BLD: 9.1 % (ref 22–41)
MCH RBC QN AUTO: 25.8 PG (ref 27–33)
MCHC RBC AUTO-ENTMCNC: 30.3 G/DL (ref 33.6–35)
MCV RBC AUTO: 85.1 FL (ref 81.4–97.8)
MONOCYTES # BLD AUTO: 0.49 K/UL (ref 0–0.85)
MONOCYTES NFR BLD AUTO: 5.7 % (ref 0–13.4)
NEUTROPHILS # BLD AUTO: 7.04 K/UL (ref 2–7.15)
NEUTROPHILS NFR BLD: 81.3 % (ref 44–72)
NRBC # BLD AUTO: 0 K/UL
NRBC BLD-RTO: 0 /100 WBC
PLATELET # BLD AUTO: 521 K/UL (ref 164–446)
PMV BLD AUTO: 9.6 FL (ref 9–12.9)
POTASSIUM SERPL-SCNC: 4 MMOL/L (ref 3.6–5.5)
PROT SERPL-MCNC: 7.1 G/DL (ref 6–8.2)
RBC # BLD AUTO: 3.76 M/UL (ref 4.2–5.4)
SODIUM SERPL-SCNC: 139 MMOL/L (ref 135–145)
TIBC SERPL-MCNC: 338 UG/DL (ref 250–450)
UIBC SERPL-MCNC: 304 UG/DL (ref 110–370)
WBC # BLD AUTO: 8.7 K/UL (ref 4.8–10.8)

## 2021-05-17 PROCEDURE — 700105 HCHG RX REV CODE 258: Performed by: INTERNAL MEDICINE

## 2021-05-17 PROCEDURE — 96417 CHEMO IV INFUS EACH ADDL SEQ: CPT

## 2021-05-17 PROCEDURE — 83540 ASSAY OF IRON: CPT

## 2021-05-17 PROCEDURE — 83550 IRON BINDING TEST: CPT

## 2021-05-17 PROCEDURE — 700111 HCHG RX REV CODE 636 W/ 250 OVERRIDE (IP): Performed by: INTERNAL MEDICINE

## 2021-05-17 PROCEDURE — 96413 CHEMO IV INFUSION 1 HR: CPT

## 2021-05-17 PROCEDURE — 96415 CHEMO IV INFUSION ADDL HR: CPT

## 2021-05-17 PROCEDURE — 82728 ASSAY OF FERRITIN: CPT

## 2021-05-17 PROCEDURE — 96375 TX/PRO/DX INJ NEW DRUG ADDON: CPT

## 2021-05-17 PROCEDURE — 80053 COMPREHEN METABOLIC PANEL: CPT

## 2021-05-17 PROCEDURE — 85025 COMPLETE CBC W/AUTO DIFF WBC: CPT

## 2021-05-17 PROCEDURE — 304540 HCHG NITRO SET VENT 2ND TUB

## 2021-05-17 RX ORDER — HEPARIN SODIUM (PORCINE) LOCK FLUSH IV SOLN 100 UNIT/ML 100 UNIT/ML
500 SOLUTION INTRAVENOUS PRN
Status: CANCELLED | OUTPATIENT
Start: 2021-05-17

## 2021-05-17 RX ORDER — DIPHENHYDRAMINE HYDROCHLORIDE 50 MG/ML
50 INJECTION INTRAMUSCULAR; INTRAVENOUS PRN
Status: CANCELLED | OUTPATIENT
Start: 2021-05-17

## 2021-05-17 RX ORDER — ONDANSETRON 8 MG/1
8 TABLET, ORALLY DISINTEGRATING ORAL PRN
Status: CANCELLED | OUTPATIENT
Start: 2021-05-17

## 2021-05-17 RX ORDER — 0.9 % SODIUM CHLORIDE 0.9 %
3 VIAL (ML) INJECTION PRN
Status: CANCELLED | OUTPATIENT
Start: 2021-05-17

## 2021-05-17 RX ORDER — PROCHLORPERAZINE MALEATE 10 MG
10 TABLET ORAL EVERY 6 HOURS PRN
Status: CANCELLED | OUTPATIENT
Start: 2021-05-17

## 2021-05-17 RX ORDER — 0.9 % SODIUM CHLORIDE 0.9 %
10 VIAL (ML) INJECTION PRN
Status: CANCELLED | OUTPATIENT
Start: 2021-05-17

## 2021-05-17 RX ORDER — 0.9 % SODIUM CHLORIDE 0.9 %
VIAL (ML) INJECTION PRN
Status: CANCELLED | OUTPATIENT
Start: 2021-05-17

## 2021-05-17 RX ORDER — SODIUM CHLORIDE 9 MG/ML
INJECTION, SOLUTION INTRAVENOUS CONTINUOUS
Status: CANCELLED | OUTPATIENT
Start: 2021-05-17

## 2021-05-17 RX ORDER — METHYLPREDNISOLONE SODIUM SUCCINATE 125 MG/2ML
125 INJECTION, POWDER, LYOPHILIZED, FOR SOLUTION INTRAMUSCULAR; INTRAVENOUS PRN
Status: CANCELLED | OUTPATIENT
Start: 2021-05-17

## 2021-05-17 RX ORDER — ONDANSETRON 2 MG/ML
4 INJECTION INTRAMUSCULAR; INTRAVENOUS PRN
Status: CANCELLED | OUTPATIENT
Start: 2021-05-17

## 2021-05-17 RX ORDER — ACETAMINOPHEN 325 MG/1
1000 TABLET ORAL EVERY 4 HOURS PRN
Status: ON HOLD | COMMUNITY
End: 2022-01-01

## 2021-05-17 RX ORDER — EPINEPHRINE 1 MG/ML(1)
0.5 AMPUL (ML) INJECTION PRN
Status: CANCELLED | OUTPATIENT
Start: 2021-05-17

## 2021-05-17 RX ADMIN — DOCETAXEL 147.8 MG: 20 INJECTION, SOLUTION, CONCENTRATE INTRAVENOUS at 11:31

## 2021-05-17 RX ADMIN — ONDANSETRON 16 MG: 2 INJECTION INTRAMUSCULAR; INTRAVENOUS at 11:10

## 2021-05-17 RX ADMIN — DEXAMETHASONE SODIUM PHOSPHATE 12 MG: 4 INJECTION, SOLUTION INTRA-ARTICULAR; INTRALESIONAL; INTRAMUSCULAR; INTRAVENOUS; SOFT TISSUE at 10:57

## 2021-05-17 RX ADMIN — CYCLOPHOSPHAMIDE 1182 MG: 1 INJECTION, POWDER, FOR SOLUTION INTRAVENOUS; ORAL at 13:33

## 2021-05-17 ASSESSMENT — PAIN DESCRIPTION - PAIN TYPE: TYPE: CHRONIC PAIN

## 2021-05-17 ASSESSMENT — FIBROSIS 4 INDEX: FIB4 SCORE: 1.11

## 2021-05-17 NOTE — PROGRESS NOTES
Patient arrived ambulatory to IS for C2D1 Cytoxan/Taxotere.  PIV established with good blood return noted, labs drawn per order and results within parameters to treat.  Pre medications given and chemotherapy infused.  Patient tolerated well.  PIV flushed, removed, and gauze/coban placed.  Confirmed appointment for Udenyca and Iron Dextran tomorrow at 1530 and she ambulated out of clinic in no apparent distress.

## 2021-05-17 NOTE — PROGRESS NOTES
Chemotherapy Verification - SECONDARY RN       Height = 162 cm  Weight = 86.3 kg  BSA = 1.97 m2       Medication: Taxotere  Dose: 75 mg/m2  Calculated Dose: 147.75 mg                             (In mg/m2, AUC, mg/kg)     Medication: Cytoxan  Dose: 600 mg/m2  Calculated Dose: 1,182 mg                             (In mg/m2, AUC, mg/kg)      I confirm that this process was performed independently.

## 2021-05-17 NOTE — PROGRESS NOTES
Chemotherapy Verification - PRIMARY RN      Height = 162 cm  Weight = 86.3 kg  BSA = 1.97 m2       Medication: Taxotere  Dose: 75 mg/m2    Calculated Dose: 147.75 mg                             (In mg/m2, AUC, mg/kg)     Medication: Cytoxan  Dose: 600 mg/m2    Calculated Dose: 1185 mg                               I confirm this process was performed independently with the BSA and all final chemotherapy dosing calculations congruent.  Any discrepancies of 10% or greater have been addressed with the chemotherapy pharmacist. The resolution of the discrepancy has been documented in the EPIC progress notes.

## 2021-05-17 NOTE — PROGRESS NOTES
"Pharmacy Chemotherapy Verification  Patient Name: Ashleigh Marquis   Dx: invasive ductal carcinoma  Protocol: TC     Cycle 2  Previous treatment = 4/26/21    Regimen:   Docetaxel (Taxotere) 75 mg/m2 IV over 30 min Day 1  Cyclophosphamide 600 mg/m2 IV over 30 minutes Day 1   Q21day cycles x4 cycles  *Dosing Reference*  Docetaxel With Cyclophosphamide Is Associated With an Overall Survival Benefit Compared With Doxorubicin and Cyclophosphamide: 7-Year Follow-Up of  Oncology Research Trial 9735.    Allergies:  Sulfa drugs and Toradol     /61   Pulse 90   Temp 36.4 °C (97.5 °F) (Temporal)   Resp 18   Ht 1.62 m (5' 3.78\")   Wt 86.3 kg (190 lb 4.1 oz)   LMP 11/02/2015   SpO2 99%   BMI 32.88 kg/m²  Body surface area is 1.97 meters squared.     Labs:  5/17/21  Meet treatment parameters    ER/DC neg  HER2 1+    Docetaxel (Taxotere) 75 mg/m2 x 1.97 m2 = 148 mg  OK to treat with final dose = 147.8 mg IV   Final [conc] ~ 0.59 mg/mL     Cyclophosphamide 600 mg/m2 x 1.97 m2 = 1182 mg  OK to treat with final dose  = 1182 mg IV     Brian Ambrosio, PharmD    "

## 2021-05-17 NOTE — PROGRESS NOTES
"Nutrition Services: Brief Update  Wt Readings from Last 7 Encounters:   05/17/21 86.3 kg (190 lb 4.1 oz)   04/27/21 86.7 kg (191 lb 2.2 oz)   04/26/21 84.2 kg (185 lb 10 oz)   03/23/21 86.2 kg (190 lb 0.6 oz)   11/03/20 68 kg (150 lb)   10/28/20 67.6 kg (149 lb)   10/04/20 67.6 kg (149 lb)     Weight Change: wt remaining stable within past ~ 3 weeks.     Pt requested RD visit. Pt states receives food from meals on wheels through [facility], though does not like to eat it because the food \"is gross\". Pt does not describe why food is \"gross\" or what food she receives through this service. Pt  does not have access to other food, though  is able to make oatmeal in the kitchen which she enjoys. When asked how pt obtains oatmeal packages, pt  does not have access to kitchen and can only receive food through meals on wheels. Pt jumps to separate conversation topic and discloses PTSD from a friend's death by suicide. Pt returns to topic of nutrition and states would like nutrition shakes to be delivered to her. [facility] can request rides, but only if done in advance. [facility] was speaking to SW about the need for different food, though cannot recall who. Pt interrupts RD questions with separate topic of conversation, asking how RD likes the wig she presents with today. Pt then mentions that oncologist is going to try setting her up in a \"separate wing\" of the shelter as she will be able to have her own food there.     Plan/Recommend:  • Difficult to discern the needs of this patient, as is provided Meals on Wheels, which provides nutritionally complete meals. Discussed with pt that these are the resources available and can sometimes be limited in adjusting for specific preferences.   • RD discussed can relay pt's desire for [facility] with her SW at [facility]. In RD opinion, nutrition shakes are not medically necessary as is able to maintain weight and has access to nutritionally complete food. RD to discuss " with SW at [facility] who can set this up for pt if desired and necessary.   • RD to contact [facility] to better understand availability of food that help to encourage intake given pt's preferences    Pt verbalizes understanding, though RD unsure if pt is receptive to information provided. RD available for questions/concerns    [phone]

## 2021-05-18 ENCOUNTER — OUTPATIENT INFUSION SERVICES (OUTPATIENT)
Dept: ONCOLOGY | Facility: MEDICAL CENTER | Age: 61
End: 2021-05-18
Attending: INTERNAL MEDICINE
Payer: MEDICAID

## 2021-05-18 VITALS
WEIGHT: 188.49 LBS | HEIGHT: 64 IN | SYSTOLIC BLOOD PRESSURE: 154 MMHG | TEMPERATURE: 97 F | HEART RATE: 93 BPM | DIASTOLIC BLOOD PRESSURE: 77 MMHG | RESPIRATION RATE: 18 BRPM | OXYGEN SATURATION: 98 % | BODY MASS INDEX: 32.18 KG/M2

## 2021-05-18 DIAGNOSIS — D50.9 IRON DEFICIENCY ANEMIA, UNSPECIFIED IRON DEFICIENCY ANEMIA TYPE: ICD-10-CM

## 2021-05-18 DIAGNOSIS — C50.111 MALIGNANT NEOPLASM OF CENTRAL PORTION OF RIGHT BREAST IN FEMALE, ESTROGEN RECEPTOR NEGATIVE (HCC): ICD-10-CM

## 2021-05-18 DIAGNOSIS — Z17.1 MALIGNANT NEOPLASM OF CENTRAL PORTION OF RIGHT BREAST IN FEMALE, ESTROGEN RECEPTOR NEGATIVE (HCC): ICD-10-CM

## 2021-05-18 PROCEDURE — 96365 THER/PROPH/DIAG IV INF INIT: CPT

## 2021-05-18 PROCEDURE — 700111 HCHG RX REV CODE 636 W/ 250 OVERRIDE (IP): Performed by: INTERNAL MEDICINE

## 2021-05-18 PROCEDURE — 700105 HCHG RX REV CODE 258: Performed by: INTERNAL MEDICINE

## 2021-05-18 PROCEDURE — 96372 THER/PROPH/DIAG INJ SC/IM: CPT | Mod: XU

## 2021-05-18 PROCEDURE — 96375 TX/PRO/DX INJ NEW DRUG ADDON: CPT

## 2021-05-18 RX ORDER — METHYLPREDNISOLONE SODIUM SUCCINATE 125 MG/2ML
125 INJECTION, POWDER, LYOPHILIZED, FOR SOLUTION INTRAMUSCULAR; INTRAVENOUS PRN
Status: CANCELLED | OUTPATIENT
Start: 2021-05-18

## 2021-05-18 RX ORDER — EPINEPHRINE 1 MG/ML(1)
0.5 AMPUL (ML) INJECTION PRN
Status: CANCELLED | OUTPATIENT
Start: 2021-05-18

## 2021-05-18 RX ORDER — DIPHENHYDRAMINE HYDROCHLORIDE 50 MG/ML
50 INJECTION INTRAMUSCULAR; INTRAVENOUS PRN
Status: CANCELLED | OUTPATIENT
Start: 2021-05-18

## 2021-05-18 RX ORDER — METHYLPREDNISOLONE SODIUM SUCCINATE 125 MG/2ML
125 INJECTION, POWDER, LYOPHILIZED, FOR SOLUTION INTRAMUSCULAR; INTRAVENOUS PRN
Status: DISCONTINUED | OUTPATIENT
Start: 2021-05-18 | End: 2021-05-18 | Stop reason: HOSPADM

## 2021-05-18 RX ORDER — SODIUM CHLORIDE 9 MG/ML
INJECTION, SOLUTION INTRAVENOUS CONTINUOUS
Status: CANCELLED | OUTPATIENT
Start: 2021-05-18

## 2021-05-18 RX ADMIN — METHYLPREDNISOLONE SODIUM SUCCINATE 125 MG: 125 INJECTION, POWDER, FOR SOLUTION INTRAMUSCULAR; INTRAVENOUS at 16:17

## 2021-05-18 RX ADMIN — SODIUM CHLORIDE 1000 MG: 9 INJECTION, SOLUTION INTRAVENOUS at 16:32

## 2021-05-18 RX ADMIN — PEGFILGRASTIM-CBQV 6 MG: 6 INJECTION, SOLUTION SUBCUTANEOUS at 16:17

## 2021-05-18 ASSESSMENT — FIBROSIS 4 INDEX: FIB4 SCORE: 0.6

## 2021-05-19 NOTE — PROGRESS NOTES
Patient to [facility] for Udenyca injection and Iron Dextran. Labs previously done. PIV inserted into right forearm, flushed with + blood return. Premed given. Udenyca injected into right abdomen. Iron infused at 75ml/hr for 5 minutes. Patient observed for 10 minutes. No s/s of infusion reaction. Iron infused at 333ml/hr for remainder of infusion. No s/s of infusion reaction. BP taken and is within parameters. PIV flushed with + blood return and removed. Gauze and Coban applied as a dressing. Patient has future appointment. Patient to home in care of self.

## 2021-06-07 ENCOUNTER — OUTPATIENT INFUSION SERVICES (OUTPATIENT)
Dept: ONCOLOGY | Facility: MEDICAL CENTER | Age: 61
End: 2021-06-07
Attending: INTERNAL MEDICINE
Payer: MEDICAID

## 2021-06-07 ENCOUNTER — TELEPHONE (OUTPATIENT)
Dept: ONCOLOGY | Facility: MEDICAL CENTER | Age: 61
End: 2021-06-07

## 2021-06-07 VITALS
HEART RATE: 90 BPM | RESPIRATION RATE: 18 BRPM | OXYGEN SATURATION: 96 % | TEMPERATURE: 98 F | DIASTOLIC BLOOD PRESSURE: 61 MMHG | WEIGHT: 188.93 LBS | BODY MASS INDEX: 33.48 KG/M2 | HEIGHT: 63 IN | SYSTOLIC BLOOD PRESSURE: 129 MMHG

## 2021-06-07 DIAGNOSIS — Z17.1 MALIGNANT NEOPLASM OF CENTRAL PORTION OF RIGHT BREAST IN FEMALE, ESTROGEN RECEPTOR NEGATIVE (HCC): ICD-10-CM

## 2021-06-07 DIAGNOSIS — C50.111 MALIGNANT NEOPLASM OF CENTRAL PORTION OF RIGHT BREAST IN FEMALE, ESTROGEN RECEPTOR NEGATIVE (HCC): ICD-10-CM

## 2021-06-07 LAB
ALBUMIN SERPL BCP-MCNC: 4.2 G/DL (ref 3.2–4.9)
ALBUMIN/GLOB SERPL: 1.3 G/DL
ALP SERPL-CCNC: 111 U/L (ref 30–99)
ALT SERPL-CCNC: 16 U/L (ref 2–50)
ANION GAP SERPL CALC-SCNC: 11 MMOL/L (ref 7–16)
ANISOCYTOSIS BLD QL SMEAR: ABNORMAL
AST SERPL-CCNC: 17 U/L (ref 12–45)
BASOPHILS # BLD AUTO: 0.5 % (ref 0–1.8)
BASOPHILS # BLD: 0.05 K/UL (ref 0–0.12)
BILIRUB SERPL-MCNC: 0.2 MG/DL (ref 0.1–1.5)
BUN SERPL-MCNC: 11 MG/DL (ref 8–22)
CALCIUM SERPL-MCNC: 10.4 MG/DL (ref 8.5–10.5)
CHLORIDE SERPL-SCNC: 102 MMOL/L (ref 96–112)
CO2 SERPL-SCNC: 22 MMOL/L (ref 20–33)
COMMENT 1642: NORMAL
CREAT SERPL-MCNC: 0.54 MG/DL (ref 0.5–1.4)
EOSINOPHIL # BLD AUTO: 0 K/UL (ref 0–0.51)
EOSINOPHIL NFR BLD: 0 % (ref 0–6.9)
ERYTHROCYTE [DISTWIDTH] IN BLOOD BY AUTOMATED COUNT: 68.4 FL (ref 35.9–50)
GLOBULIN SER CALC-MCNC: 3.3 G/DL (ref 1.9–3.5)
GLUCOSE SERPL-MCNC: 125 MG/DL (ref 65–99)
HCT VFR BLD AUTO: 35.3 % (ref 37–47)
HGB BLD-MCNC: 10.8 G/DL (ref 12–16)
IMM GRANULOCYTES # BLD AUTO: 0.06 K/UL (ref 0–0.11)
IMM GRANULOCYTES NFR BLD AUTO: 0.6 % (ref 0–0.9)
LYMPHOCYTES # BLD AUTO: 0.32 K/UL (ref 1–4.8)
LYMPHOCYTES NFR BLD: 3 % (ref 22–41)
MCH RBC QN AUTO: 27.1 PG (ref 27–33)
MCHC RBC AUTO-ENTMCNC: 30.6 G/DL (ref 33.6–35)
MCV RBC AUTO: 88.5 FL (ref 81.4–97.8)
MICROCYTES BLD QL SMEAR: ABNORMAL
MONOCYTES # BLD AUTO: 0.08 K/UL (ref 0–0.85)
MONOCYTES NFR BLD AUTO: 0.8 % (ref 0–13.4)
MORPHOLOGY BLD-IMP: NORMAL
NEUTROPHILS # BLD AUTO: 10.14 K/UL (ref 2–7.15)
NEUTROPHILS NFR BLD: 95.1 % (ref 44–72)
NRBC # BLD AUTO: 0 K/UL
NRBC BLD-RTO: 0 /100 WBC
OVALOCYTES BLD QL SMEAR: NORMAL
PLATELET # BLD AUTO: 467 K/UL (ref 164–446)
PMV BLD AUTO: 10.2 FL (ref 9–12.9)
POIKILOCYTOSIS BLD QL SMEAR: NORMAL
POTASSIUM SERPL-SCNC: 4.2 MMOL/L (ref 3.6–5.5)
PROT SERPL-MCNC: 7.5 G/DL (ref 6–8.2)
RBC # BLD AUTO: 3.99 M/UL (ref 4.2–5.4)
RBC BLD AUTO: PRESENT
SODIUM SERPL-SCNC: 135 MMOL/L (ref 135–145)
WBC # BLD AUTO: 10.7 K/UL (ref 4.8–10.8)

## 2021-06-07 PROCEDURE — 80053 COMPREHEN METABOLIC PANEL: CPT

## 2021-06-07 PROCEDURE — 85025 COMPLETE CBC W/AUTO DIFF WBC: CPT

## 2021-06-07 PROCEDURE — 36415 COLL VENOUS BLD VENIPUNCTURE: CPT

## 2021-06-07 RX ORDER — 0.9 % SODIUM CHLORIDE 0.9 %
10 VIAL (ML) INJECTION PRN
Status: CANCELLED | OUTPATIENT
Start: 2021-06-07

## 2021-06-07 RX ORDER — 0.9 % SODIUM CHLORIDE 0.9 %
3 VIAL (ML) INJECTION PRN
Status: CANCELLED | OUTPATIENT
Start: 2021-06-07

## 2021-06-07 RX ORDER — HEPARIN SODIUM (PORCINE) LOCK FLUSH IV SOLN 100 UNIT/ML 100 UNIT/ML
500 SOLUTION INTRAVENOUS PRN
Status: CANCELLED | OUTPATIENT
Start: 2021-06-07

## 2021-06-07 RX ORDER — 0.9 % SODIUM CHLORIDE 0.9 %
VIAL (ML) INJECTION PRN
Status: CANCELLED | OUTPATIENT
Start: 2021-06-07

## 2021-06-07 ASSESSMENT — FIBROSIS 4 INDEX: FIB4 SCORE: 0.6

## 2021-06-07 NOTE — TELEPHONE ENCOUNTER
Pt cancelled appt at [facility] this am and arrived for chemotherapy. Dr Valle requests that pt is seen in office prior to treatment. Appt for [facility] scheduled for 6/8 at 1300 and chemotherapy rescheduled to 6/9. Called pt to confirm POC, she reports having recent transportation housing issues. Nurse navigator on call Sadie Acevedo notified and she will work with Marcela Kam  for recent changes for this pt.

## 2021-06-08 ENCOUNTER — APPOINTMENT (OUTPATIENT)
Dept: ONCOLOGY | Facility: MEDICAL CENTER | Age: 61
End: 2021-06-08
Attending: INTERNAL MEDICINE
Payer: MEDICAID

## 2021-06-08 ENCOUNTER — PATIENT OUTREACH (OUTPATIENT)
Dept: OTHER | Facility: MEDICAL CENTER | Age: 61
End: 2021-06-08

## 2021-06-08 NOTE — PROGRESS NOTES
Oncology nurse navigator received a call from Irena Brooke that the patient showed up for her chemotherapy appointment but cancelled her appointment with [facility] the day before so [facility] would not sign her chemotherapy orders.  Oncology nurse navigator called patient at her shelter which she referred to as [facility].  She states that she has been staying there and now they have served her an eviction notice for the  of this month.  She states that she has several notes form her careteam providers stating that she needs to have a place to stay while undergoing her chemotherapy.  Patient states that to no avail has this changed her housing situation.  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  Patient also states that she was unaware that she had to see Dr. Valle before her treatments and has not seen him for a long time.  She states that the shelter has been providing transportation for her and she has lost that service as well.  However she then states that they are going to be taking her to the [facility] to get her photo ID.  She needs a ride to her appointment at [facility] and to her chemotherapy.  I asked her about [facility]TM.  She stated that they are really difficult to work with and that they are awful.  Oncology nurse navigator told her I would call and try to get an approved service for her appointments this week.  Oncology nurse navigator confirmed rides for her appointment at [facility] at 1 picking the patient up at 1200 at her shelter and then  at 200pm at [facility] to return her to her shelter on .  Patient will also be picked up at 1000am on  for her chemotherapy appointment at 1030  at 145pm.  MT advised patient that this service will be covered for her for the next 45 days and will  on  in the meantime she will need to apply for RTC application the patient will need to call [phone].

## 2021-06-08 NOTE — PROGRESS NOTES
"Pharmacy Chemotherapy Verification  Patient Name: Ashleigh Marquis   Dx: invasive ductal carcinoma  Protocol: TC       Regimen:   Docetaxel (Taxotere) 75 mg/m2 IV over 30 min Day 1  Cyclophosphamide 600 mg/m2 IV over 30 minutes Day 1   Q21day cycles x4 cycles  *Dosing Reference*  Docetaxel With Cyclophosphamide Is Associated With an Overall Survival Benefit Compared With Doxorubicin and Cyclophosphamide: 7-Year Follow-Up of  Oncology Research Trial 9735.    Allergies:  Sulfa drugs and Toradol     /83   Pulse 79   Temp 36.4 °C (97.5 °F) (Temporal)   Resp 18   Ht 1.605 m (5' 3.19\")   Wt 84.6 kg (186 lb 8.2 oz)   LMP 11/02/2015   BMI 32.84 kg/m²  Body surface area is 1.94 meters squared.    Labs: 6/9/21  Meet treatment parameters    ER/CT neg  HER2 1+     Drug Order   (Drug name, dose, route, IV Fluid & volume, frequency, number of doses) Cycle 3    Previous treatment: 5/17/21      Medication = DOCEtaxel (Taxotere)  Base Dose= 75 mg/m2  Calc Dose: Base Dose x 1.94 m2 = 145.5 mg  Final Dose = 145.6 mg  Route = IV  Fluid & Volume =  mL  Final [conc] ~ 0.57 mg/mL   Admin Duration = Over 60 minutes            Okay to treat with final dose   Medication = Cyclophosphamide (Cytoxan)  Base Dose= 600 mg/m2  Calc Dose: Base Dose x 1.94 m2 = 1164 mg  Final Dose = 1164 mg  Route = IV  Fluid & Volume =  mL  Admin Duration = Over 30 minutes            Okay to treat with final dose       "

## 2021-06-08 NOTE — PROGRESS NOTES
"Pt called on call navigation line.  She reported that \"Vanessa\" had arranged rides for her today and tomorrow.  The cab come to get her today to take her to appointment, but never showed up to pick her up.  Pt difficult to understand at times, a lot of background noise, and patient talking rapidly.  Pt did not give navigator time to try and see if patient's , Marcela HORAN, was available to discuss concerns.  Pt saying someone that was outside oncology office where she was waiting offered her a ride home.  Pt stating she felt uncomfortable accepting because he was a stranger, but did and he took her to \"[facility]\" where she is currently staying.  Pt concerned that her ride won't show up again tomorrow.  Let patient know MTM has number to call if ride does not show up/is late.  Pt took a while to get a pen and something to write on.  Provided number repeating 2 times for patient.  Let patient know she also had appointment day after chemo for injection.  Provided time of appointment and length.  Informed patient she would need to call MT to schedule a ride for that appointment.  Pt thanked navigator for information.  Pt talking to someone in the background and forgot to hang up at end of conversation.  She was repeating same information about her ride not showing up and having to take a ride from a stranger back to place where she lives.    Provided update to , Marcela HORAN, who has been working with patient.  "

## 2021-06-08 NOTE — PROGRESS NOTES
On 6-8-21, Oncology Social Worker, Marcela Kam, contacted pt via phone. ANNABEL Kam re-introduced self as part of pt's social support team at [facility]. ANNABEL Kam inquired on pt's and/or families current needs. Pt reports she was not able to f/u with MTM because her  at [facility] had always set up the appts and the NN on-call had set up the current rides. Pt reports the ride did not show back up to pick her up after she was done with her appt and she tried to call the cab company. Pt reports she did not know she was suppose to call [facility] to inquire where her ride was and took a ride back to [facility] with a stranger.   ANNABEL Kam went through the steps of calling [facility], setting up an appt, providing her Medicaid ID #, having the address, and who to call if her ride does not show. Pt was able to repeat all steps six times and also at the end of the call to confirm she knew how to set up transportation for 6-10 and 6-16, and going forward as well.  Pt reports her relationship with her , Judie, at [facility], has gotten better and pt requested an extension to stay while in tx. Pt reports Judie has not made a final decision as of yet. ANNABEL Kam asked if pt would be willing to sign a consent form, the next time she comes to infusion, in order for ANNABEL Kam to coordinate care for pt with pt's  at [facility]. Pt agreed and will fill out ANASTACIO when she returns to infusion.  ANNABEL Kam inquired on other areas of concern at this time. Pt reports she is very happy with all the care and concern from [facility], [facility], and the Social Service department, and has no other concerns at this time.    ANNABEL Kam thanked pt for her time, provided direct contact information, and encouraged pt to contact for further concerns/questions.

## 2021-06-09 ENCOUNTER — OUTPATIENT INFUSION SERVICES (OUTPATIENT)
Dept: ONCOLOGY | Facility: MEDICAL CENTER | Age: 61
End: 2021-06-09
Attending: INTERNAL MEDICINE
Payer: MEDICAID

## 2021-06-09 ENCOUNTER — DOCUMENTATION (OUTPATIENT)
Dept: ONCOLOGY | Facility: MEDICAL CENTER | Age: 61
End: 2021-06-09

## 2021-06-09 VITALS
TEMPERATURE: 97.5 F | BODY MASS INDEX: 33.05 KG/M2 | HEART RATE: 79 BPM | HEIGHT: 63 IN | SYSTOLIC BLOOD PRESSURE: 126 MMHG | WEIGHT: 186.51 LBS | DIASTOLIC BLOOD PRESSURE: 83 MMHG | RESPIRATION RATE: 18 BRPM

## 2021-06-09 DIAGNOSIS — C50.111 MALIGNANT NEOPLASM OF CENTRAL PORTION OF RIGHT BREAST IN FEMALE, ESTROGEN RECEPTOR NEGATIVE (HCC): ICD-10-CM

## 2021-06-09 DIAGNOSIS — Z17.1 MALIGNANT NEOPLASM OF CENTRAL PORTION OF RIGHT BREAST IN FEMALE, ESTROGEN RECEPTOR NEGATIVE (HCC): ICD-10-CM

## 2021-06-09 PROCEDURE — 304540 HCHG NITRO SET VENT 2ND TUB

## 2021-06-09 PROCEDURE — 96413 CHEMO IV INFUSION 1 HR: CPT

## 2021-06-09 PROCEDURE — 700111 HCHG RX REV CODE 636 W/ 250 OVERRIDE (IP): Performed by: INTERNAL MEDICINE

## 2021-06-09 PROCEDURE — 96367 TX/PROPH/DG ADDL SEQ IV INF: CPT

## 2021-06-09 PROCEDURE — 96417 CHEMO IV INFUS EACH ADDL SEQ: CPT

## 2021-06-09 PROCEDURE — 96375 TX/PRO/DX INJ NEW DRUG ADDON: CPT

## 2021-06-09 PROCEDURE — 700105 HCHG RX REV CODE 258: Performed by: INTERNAL MEDICINE

## 2021-06-09 RX ORDER — 0.9 % SODIUM CHLORIDE 0.9 %
10 VIAL (ML) INJECTION PRN
Status: CANCELLED | OUTPATIENT
Start: 2021-06-09

## 2021-06-09 RX ORDER — SODIUM CHLORIDE 9 MG/ML
INJECTION, SOLUTION INTRAVENOUS CONTINUOUS
Status: CANCELLED | OUTPATIENT
Start: 2021-06-09

## 2021-06-09 RX ORDER — PROCHLORPERAZINE MALEATE 10 MG
10 TABLET ORAL EVERY 6 HOURS PRN
Status: CANCELLED | OUTPATIENT
Start: 2021-06-09

## 2021-06-09 RX ORDER — ONDANSETRON 2 MG/ML
4 INJECTION INTRAMUSCULAR; INTRAVENOUS PRN
Status: CANCELLED | OUTPATIENT
Start: 2021-06-09

## 2021-06-09 RX ORDER — 0.9 % SODIUM CHLORIDE 0.9 %
3 VIAL (ML) INJECTION PRN
Status: CANCELLED | OUTPATIENT
Start: 2021-06-09

## 2021-06-09 RX ORDER — ONDANSETRON 8 MG/1
8 TABLET, ORALLY DISINTEGRATING ORAL PRN
Status: CANCELLED | OUTPATIENT
Start: 2021-06-09

## 2021-06-09 RX ORDER — 0.9 % SODIUM CHLORIDE 0.9 %
VIAL (ML) INJECTION PRN
Status: CANCELLED | OUTPATIENT
Start: 2021-06-09

## 2021-06-09 RX ORDER — HEPARIN SODIUM (PORCINE) LOCK FLUSH IV SOLN 100 UNIT/ML 100 UNIT/ML
500 SOLUTION INTRAVENOUS PRN
Status: CANCELLED | OUTPATIENT
Start: 2021-06-09

## 2021-06-09 RX ADMIN — DOCETAXEL 145.6 MG: 20 INJECTION, SOLUTION, CONCENTRATE INTRAVENOUS at 12:10

## 2021-06-09 RX ADMIN — DEXAMETHASONE SODIUM PHOSPHATE 12 MG: 4 INJECTION, SOLUTION INTRA-ARTICULAR; INTRALESIONAL; INTRAMUSCULAR; INTRAVENOUS; SOFT TISSUE at 11:22

## 2021-06-09 RX ADMIN — ONDANSETRON 16 MG: 2 INJECTION INTRAMUSCULAR; INTRAVENOUS at 11:36

## 2021-06-09 RX ADMIN — CYCLOPHOSPHAMIDE 1164 MG: 1 INJECTION, POWDER, FOR SOLUTION INTRAVENOUS; ORAL at 13:23

## 2021-06-09 ASSESSMENT — FIBROSIS 4 INDEX: FIB4 SCORE: 0.53

## 2021-06-09 NOTE — PROGRESS NOTES
Nutrition Services: Brief Update  Wt Readings from Last 7 Encounters:   06/09/21 84.6 kg (186 lb 8.2 oz)   06/07/21 85.7 kg (188 lb 15 oz)   05/18/21 85.5 kg (188 lb 7.9 oz)   05/17/21 86.3 kg (190 lb 4.1 oz)   04/27/21 86.7 kg (191 lb 2.2 oz)   04/26/21 84.2 kg (185 lb 10 oz)   03/23/21 86.2 kg (190 lb 0.6 oz)     Weight Change: wt fluctuating, though appears stable overall     RD received message that pt was requesting information regarding [facility]. RD emailed SW department at [facility], received an email stating the SW  would reach out, though RD has not gotten a call back.     RD printed out [facility] information and highlighted information pt would need to obtain: proof of income/ proof of address. RD also highlighted number to call for [facility] to get started with application over the phone.     Plan/Recommend:  · RD attempted to visit pt in [facility]. Pt did not wake to name. RD left [facility] application on tray table and informed RN. RD will reach out to oncologist regarding prescription for [facility]  · RD sent another email to SW supervisor at [facility] to ensure consistency of care and see what resources and options are available to pt.     RD remains available as needed and will monitor/ visit as indicated.   Please contact [phone]

## 2021-06-09 NOTE — PROGRESS NOTES
Chemotherapy Verification - SECONDARY RN       Height = 160.5 cm  Weight = 84.6 kg  BSA = 1.94 m2       Medication: Taxotere  Dose: 75 mg/m2  Calculated Dose: 145.65 mg                             (In mg/m2, AUC, mg/kg)     Medication: Cytoxan  Dose: 600 mg/m2  Calculated Dose: 1164 mg                             (In mg/m2, AUC, mg/kg)      I confirm that this process was performed independently.

## 2021-06-09 NOTE — PROGRESS NOTES
Chemotherapy Verification - PRIMARY RN      Height = 1.605m  Weight =84.6kg   BSA = 1.94m2      Medication: docetaxel  Dose: 75mg/m2  Calculated Dose: 145.5mg                             (In mg/m2, AUC, mg/kg)     Medication: cyclophosphamide  Dose: 600mg/m2  Calculated Dose: 1164mg                             (In mg/m2, AUC, mg/kg)          I confirm this process was performed independently with the BSA and all final chemotherapy dosing calculations congruent.  Any discrepancies of 10% or greater have been addressed with the chemotherapy pharmacist. The resolution of the discrepancy has been documented in the EPIC progress notes.

## 2021-06-09 NOTE — PROGRESS NOTES
Patient to [facility] for chemotherapy infusion. PIV inserted into right forearm, flushed with + blood return. Patient meets parameters for chemotherapy. Premeds given. Taxotere infused with no s/s of infusion reaction. Cytoxan infused with no s/s of infusion reaction. PIV flushed with + blood return and removed. Gauze and Coban applied as a dressing. Patient has future appointment. Patient to home in care of self.

## 2021-06-09 NOTE — PROGRESS NOTES
"Pharmacy Chemotherapy Verification    Patient Name: Ashleigh Marquis   Dx: invasive ductal carcinoma- ER/[place] neg, HER2 1+    Cycle 3  Previous treatment = 5/17/21    Protocol: TC     Docetaxel (Taxotere) 75 mg/m2 IV over 30 min Day 1  Cyclophosphamide 600 mg/m2 IV over 30 minutes Day 1  Q21day cycles x4 cycles  *Dosing Reference*  Docetaxel With Cyclophosphamide Is Associated With an Overall Survival Benefit Compared With Doxorubicin and Cyclophosphamide: 7-Year Follow-Up of  Oncology Research Trial 9735.    Allergies:  Sulfa drugs and Toradol     /83   Pulse 79   Temp 36.4 °C (97.5 °F) (Temporal)   Resp 18   Ht 1.605 m (5' 3.19\")   Wt 84.6 kg (186 lb 8.2 oz)   LMP 11/02/2015   BMI 32.84 kg/m²  Body surface area is 1.94 meters squared.     All lab results 6/7/21 within treatment parameters.       Docetaxel (Taxotere) 75 mg/m2 x 1.94m2 = 145.5mg  <10% difference, ok to treat with final dose = 145.6mg IV      Cyclophosphamide 600 mg/m2 x 1.94m2 = 1164 mg  <10% difference, ok to treat with final dose = 1164mg IV     Leela Torres, PharmD    "

## 2021-06-10 ENCOUNTER — PATIENT OUTREACH (OUTPATIENT)
Dept: OTHER | Facility: MEDICAL CENTER | Age: 61
End: 2021-06-10

## 2021-06-10 ENCOUNTER — OUTPATIENT INFUSION SERVICES (OUTPATIENT)
Dept: ONCOLOGY | Facility: MEDICAL CENTER | Age: 61
End: 2021-06-10
Attending: INTERNAL MEDICINE
Payer: MEDICAID

## 2021-06-10 VITALS
BODY MASS INDEX: 33.05 KG/M2 | HEIGHT: 63 IN | WEIGHT: 186.51 LBS | SYSTOLIC BLOOD PRESSURE: 138 MMHG | OXYGEN SATURATION: 98 % | DIASTOLIC BLOOD PRESSURE: 82 MMHG | RESPIRATION RATE: 18 BRPM | TEMPERATURE: 98.1 F | HEART RATE: 98 BPM

## 2021-06-10 DIAGNOSIS — C50.111 MALIGNANT NEOPLASM OF CENTRAL PORTION OF RIGHT BREAST IN FEMALE, ESTROGEN RECEPTOR NEGATIVE (HCC): ICD-10-CM

## 2021-06-10 DIAGNOSIS — Z17.1 MALIGNANT NEOPLASM OF CENTRAL PORTION OF RIGHT BREAST IN FEMALE, ESTROGEN RECEPTOR NEGATIVE (HCC): ICD-10-CM

## 2021-06-10 PROCEDURE — 96372 THER/PROPH/DIAG INJ SC/IM: CPT

## 2021-06-10 PROCEDURE — 700111 HCHG RX REV CODE 636 W/ 250 OVERRIDE (IP): Performed by: INTERNAL MEDICINE

## 2021-06-10 RX ADMIN — PEGFILGRASTIM-CBQV 6 MG: 6 INJECTION, SOLUTION SUBCUTANEOUS at 16:24

## 2021-06-10 ASSESSMENT — FIBROSIS 4 INDEX: FIB4 SCORE: 0.53

## 2021-06-10 NOTE — PROGRESS NOTES
Oncology nurse navigator called [phone] line asking for a mom's on the run wig voucher.  Patient states that she does have a 430 appointment today and that she has arranged [facility] for a  at 4.  I told her that I would leave the voucher at the  for her.  Patient thanked me for the assistance and stated that she did remember talking to me the other day.

## 2021-06-10 NOTE — PROGRESS NOTES
"Phoned pt for follow up.  Pt confirms she has a ride scheduled through [facility] for today's infusion appointment.  She states that she spoke with her worker Cat which she feels went well.  She is hoping she will be able to stay at [facility] during her treatment.  She states she is on the section eight housing wait list.    She reports she was \"breathing bad\" and was seen by her PCP who prescribed mucinex and an inhaler.  She is scheduled for a chest Xray at [facility] on 6/16/21.    Pt states she has a debt at the [facility] and as a result she is unable to obtain an ID card.  This is required for patient to obtain services at [facility].  Pt states she left her application for [facility] and the release of information allowing [facility] staff to communicate with her  Cat.  Pt denies further barriers at this time.    "

## 2021-06-10 NOTE — PROGRESS NOTES
[facility]  She denies any acute changes.  Injection given to left abdomen, she tolerated well.  Appointment schedule provided and she ambulated out of clinic in no apparent distress.

## 2021-06-14 ENCOUNTER — APPOINTMENT (OUTPATIENT)
Dept: RADIOLOGY | Facility: MEDICAL CENTER | Age: 61
End: 2021-06-14
Attending: EMERGENCY MEDICINE
Payer: MEDICAID

## 2021-06-14 ENCOUNTER — HOSPITAL ENCOUNTER (EMERGENCY)
Facility: MEDICAL CENTER | Age: 61
End: 2021-06-15
Attending: EMERGENCY MEDICINE
Payer: MEDICAID

## 2021-06-14 VITALS
BODY MASS INDEX: 30.52 KG/M2 | TEMPERATURE: 97 F | HEIGHT: 65 IN | SYSTOLIC BLOOD PRESSURE: 118 MMHG | HEART RATE: 83 BPM | RESPIRATION RATE: 13 BRPM | OXYGEN SATURATION: 94 % | WEIGHT: 183.2 LBS | DIASTOLIC BLOOD PRESSURE: 67 MMHG

## 2021-06-14 DIAGNOSIS — B36.9 FUNGAL DERMATITIS: ICD-10-CM

## 2021-06-14 LAB
ALBUMIN SERPL BCP-MCNC: 3.8 G/DL (ref 3.2–4.9)
ALBUMIN/GLOB SERPL: 1.3 G/DL
ALP SERPL-CCNC: 118 U/L (ref 30–99)
ALT SERPL-CCNC: 23 U/L (ref 2–50)
ANION GAP SERPL CALC-SCNC: 13 MMOL/L (ref 7–16)
ANISOCYTOSIS BLD QL SMEAR: ABNORMAL
APPEARANCE UR: CLEAR
AST SERPL-CCNC: 28 U/L (ref 12–45)
BACTERIA #/AREA URNS HPF: NEGATIVE /HPF
BASOPHILS # BLD AUTO: 2.5 % (ref 0–1.8)
BASOPHILS # BLD: 0.07 K/UL (ref 0–0.12)
BILIRUB SERPL-MCNC: 0.2 MG/DL (ref 0.1–1.5)
BILIRUB UR QL STRIP.AUTO: NEGATIVE
BUN SERPL-MCNC: 15 MG/DL (ref 8–22)
CALCIUM SERPL-MCNC: 9 MG/DL (ref 8.5–10.5)
CHLORIDE SERPL-SCNC: 103 MMOL/L (ref 96–112)
CK SERPL-CCNC: 39 U/L (ref 0–154)
CO2 SERPL-SCNC: 22 MMOL/L (ref 20–33)
COLOR UR: YELLOW
CREAT SERPL-MCNC: 0.9 MG/DL (ref 0.5–1.4)
EOSINOPHIL # BLD AUTO: 0.21 K/UL (ref 0–0.51)
EOSINOPHIL NFR BLD: 7.6 % (ref 0–6.9)
EPI CELLS #/AREA URNS HPF: NEGATIVE /HPF
ERYTHROCYTE [DISTWIDTH] IN BLOOD BY AUTOMATED COUNT: 69.4 FL (ref 35.9–50)
GLOBULIN SER CALC-MCNC: 2.9 G/DL (ref 1.9–3.5)
GLUCOSE SERPL-MCNC: 104 MG/DL (ref 65–99)
GLUCOSE UR STRIP.AUTO-MCNC: NEGATIVE MG/DL
HCT VFR BLD AUTO: 31.6 % (ref 37–47)
HGB BLD-MCNC: 10.1 G/DL (ref 12–16)
KETONES UR STRIP.AUTO-MCNC: ABNORMAL MG/DL
LACTATE BLD-SCNC: 1.2 MMOL/L (ref 0.5–2)
LEUKOCYTE ESTERASE UR QL STRIP.AUTO: ABNORMAL
LYMPHOCYTES # BLD AUTO: 0.28 K/UL (ref 1–4.8)
LYMPHOCYTES NFR BLD: 10.2 % (ref 22–41)
MACROCYTES BLD QL SMEAR: ABNORMAL
MANUAL DIFF BLD: NORMAL
MCH RBC QN AUTO: 28.2 PG (ref 27–33)
MCHC RBC AUTO-ENTMCNC: 32 G/DL (ref 33.6–35)
MCV RBC AUTO: 88.3 FL (ref 81.4–97.8)
MICRO URNS: ABNORMAL
MICROCYTES BLD QL SMEAR: ABNORMAL
MONOCYTES # BLD AUTO: 0.23 K/UL (ref 0–0.85)
MONOCYTES NFR BLD AUTO: 8.5 % (ref 0–13.4)
MORPHOLOGY BLD-IMP: NORMAL
NEUTROPHILS # BLD AUTO: 1.92 K/UL (ref 2–7.15)
NEUTROPHILS NFR BLD: 66.1 % (ref 44–72)
NEUTS BAND NFR BLD MANUAL: 5.1 % (ref 0–10)
NITRITE UR QL STRIP.AUTO: NEGATIVE
NRBC # BLD AUTO: 0 K/UL
NRBC BLD-RTO: 0 /100 WBC
PH UR STRIP.AUTO: 5.5 [PH] (ref 5–8)
PLATELET # BLD AUTO: 184 K/UL (ref 164–446)
PLATELET BLD QL SMEAR: NORMAL
PMV BLD AUTO: 11.1 FL (ref 9–12.9)
POTASSIUM SERPL-SCNC: 3.2 MMOL/L (ref 3.6–5.5)
PROT SERPL-MCNC: 6.7 G/DL (ref 6–8.2)
PROT UR QL STRIP: NEGATIVE MG/DL
RBC # BLD AUTO: 3.58 M/UL (ref 4.2–5.4)
RBC # URNS HPF: ABNORMAL /HPF
RBC BLD AUTO: PRESENT
RBC UR QL AUTO: ABNORMAL
SODIUM SERPL-SCNC: 138 MMOL/L (ref 135–145)
SP GR UR STRIP.AUTO: 1.02
TOXIC GRANULES BLD QL SMEAR: NORMAL
UROBILINOGEN UR STRIP.AUTO-MCNC: 0.2 MG/DL
WBC # BLD AUTO: 2.7 K/UL (ref 4.8–10.8)
WBC #/AREA URNS HPF: ABNORMAL /HPF

## 2021-06-14 PROCEDURE — 85027 COMPLETE CBC AUTOMATED: CPT

## 2021-06-14 PROCEDURE — 96374 THER/PROPH/DIAG INJ IV PUSH: CPT

## 2021-06-14 PROCEDURE — 80053 COMPREHEN METABOLIC PANEL: CPT

## 2021-06-14 PROCEDURE — 700111 HCHG RX REV CODE 636 W/ 250 OVERRIDE (IP): Performed by: EMERGENCY MEDICINE

## 2021-06-14 PROCEDURE — 700105 HCHG RX REV CODE 258: Performed by: EMERGENCY MEDICINE

## 2021-06-14 PROCEDURE — 82550 ASSAY OF CK (CPK): CPT

## 2021-06-14 PROCEDURE — 83605 ASSAY OF LACTIC ACID: CPT

## 2021-06-14 PROCEDURE — 36415 COLL VENOUS BLD VENIPUNCTURE: CPT

## 2021-06-14 PROCEDURE — 81001 URINALYSIS AUTO W/SCOPE: CPT

## 2021-06-14 PROCEDURE — 87040 BLOOD CULTURE FOR BACTERIA: CPT

## 2021-06-14 PROCEDURE — 71045 X-RAY EXAM CHEST 1 VIEW: CPT

## 2021-06-14 PROCEDURE — 99285 EMERGENCY DEPT VISIT HI MDM: CPT

## 2021-06-14 PROCEDURE — 85007 BL SMEAR W/DIFF WBC COUNT: CPT

## 2021-06-14 PROCEDURE — 87086 URINE CULTURE/COLONY COUNT: CPT

## 2021-06-14 RX ORDER — SODIUM CHLORIDE 9 MG/ML
1000 INJECTION, SOLUTION INTRAVENOUS ONCE
Status: COMPLETED | OUTPATIENT
Start: 2021-06-14 | End: 2021-06-15

## 2021-06-14 RX ADMIN — SODIUM CHLORIDE 1000 ML: 9 INJECTION, SOLUTION INTRAVENOUS at 23:10

## 2021-06-14 RX ADMIN — FENTANYL CITRATE 50 MCG: 50 INJECTION, SOLUTION INTRAMUSCULAR; INTRAVENOUS at 23:10

## 2021-06-14 ASSESSMENT — LIFESTYLE VARIABLES
DO YOU DRINK ALCOHOL: NO
DOES PATIENT WANT TO STOP DRINKING: NO

## 2021-06-14 ASSESSMENT — FIBROSIS 4 INDEX: FIB4 SCORE: 0.53

## 2021-06-15 ENCOUNTER — TELEPHONE (OUTPATIENT)
Dept: ONCOLOGY | Facility: MEDICAL CENTER | Age: 61
End: 2021-06-15

## 2021-06-15 ENCOUNTER — PATIENT OUTREACH (OUTPATIENT)
Dept: OTHER | Facility: MEDICAL CENTER | Age: 61
End: 2021-06-15

## 2021-06-15 NOTE — ED TRIAGE NOTES
"Ashleigh Marquis  60 y.o. female  Chief Complaint   Patient presents with   • Rash     Cancer patient and received chemo a couple days, pt called on call MD and was directed to come to the ED. Rash developed on R leg and abdomen which has \"pus and blood\" Bone and joint pain all over.       Vitals:    06/14/21 2000   BP: 127/88   Pulse: (!) 114   Resp: 14   Temp: 36 °C (96.8 °F)   SpO2: 97%        Pt BIBA ([facility]) for evaluation of Rash and RLE pain. Pt is a breast cancer and is receiving chemo, just had 3rd dose of chemo a couple days ago. Pt reports calling the on-call oncologist and was directed to come to ED. After the dose of chemo, pt developed a rash. Rash \"started in my belly button and down the R leg. It has pus, blood, and foul odor present and looks like a dried up liver.\"  Pt has folded up paper towel in place. Tender to touch and site is reddened. Multiple other complaints which includes bone and joint pains and has been weak for several days. Pt reports staying in the shelter and potentially is exposed to a lot.     Pt placed into family room and charge RN notified. Pt is educated on triage process and is encouraged to alert staff for any changes.     Pt and staff in proper PPE which includes mask. NO RIGHT ARM BP or STICKS.   "

## 2021-06-15 NOTE — DISCHARGE INSTRUCTIONS
Utilize miconazole cream that you can purchase over-the-counter and apply to the wound twice a day    Drink lots of fluids    Take Tylenol as needed for discomfort

## 2021-06-15 NOTE — TELEPHONE ENCOUNTER
Nutrition Services: Telephone Encounter    RD received a message from [facility] advocate, Amina, in regard to mutual pt. RD called back and had to leave voicemail for Amina. Provided contact info for call back.     [phone]

## 2021-06-15 NOTE — ED NOTES
Lab draw completed. Blood, 1st set of blood cultures (left AC), green top on ice, and urine sent to lab

## 2021-06-15 NOTE — PROGRESS NOTES
On 6-15-21, Oncology Social Worker, Marcela Kam, phoned pt's , Amina Pruett, with [facility], per pt request. Amina did not answer. Left message with introduction to pt's support team, ANNABEL Kam direct contact information, and a request for return call.

## 2021-06-15 NOTE — ED PROVIDER NOTES
"ED Provider Note    CHIEF COMPLAINT  Chief Complaint   Patient presents with   • Rash     Cancer patient and received chemo a couple days, pt called on call MD and was directed to come to the ED. Rash developed on R leg and abdomen which has \"pus and blood\" Bone and joint pain all over.       HPI  Ashleigh Marquis is a 60 y.o. female who presents with right groin pain.  The patient has a known history of breast cancer and she is currently undergoing chemotherapy.  She had her third dose today and she developed a rash and tenderness in the right groin.  She also has a rash in the periumbilical region is uncomfortable.  She has not had any fevers.  She has not had any vomiting.  She does have a little bit of a cough.  She denies difficulty with breathing.  She does have a hoarse voice.    REVIEW OF SYSTEMS  See HPI for further details. All other systems are negative.     PAST MEDICAL HISTORY  Past Medical History:   Diagnosis Date   • Anxiety and depression 03/23/2021   • Pain 03/23/2021    breast, legs, joints   • Seizure (HCC) 11/03/2020    last seizure was 11/2020   • Cancer (HCC) 1981    cervical   • Alcoholism (HCC)    • Anemia     pt states she doesn't think it is a current issue   • Anxiety    • Arthritis     osteo-legs, knees   • ASTHMA    • Chronic low back pain    • Congestive heart failure (HCC)    • Dental disorder     upper and lower dentures   • Depression    • EtOH dependence (HCC)    • Fall     alcohol related   • GERD (gastroesophageal reflux disease)    • Heart burn    • HTN    • Hypertension    • Indigestion     GERD   • Muscle disorder    • OSTEOPOROSIS    • Other specified symptom associated with female genital organs     \"I have fibroids bad\"\"   • Psychiatric disorder    • PTSD (post-traumatic stress disorder)    • Renal disorder     Hx of stones   • Seizure (HCC)     several years ago r/t alcohol withdrawl   • Ulcer    • Vitamin D deficiency        FAMILY HISTORY  [unfilled]    SOCIAL " HISTORY  Social History     Socioeconomic History   • Marital status:      Spouse name: Not on file   • Number of children: Not on file   • Years of education: Not on file   • Highest education level: Not on file   Occupational History   • Not on file   Tobacco Use   • Smoking status: Current Every Day Smoker     Packs/day: 0.50     Years: 20.00     Pack years: 10.00     Types: Cigarettes   • Smokeless tobacco: Never Used   • Tobacco comment: 1/2 pack per day   Vaping Use   • Vaping Use: Never used   Substance and Sexual Activity   • Alcohol use: Yes     Comment: [email or web address], heavy use-pt stated she is not drinking as of 3/23   • Drug use: No   • Sexual activity: Never     Partners: Male   Other Topics Concern   • Not on file   Social History Narrative    ** Merged History Encounter **          Social Determinants of Health     Financial Resource Strain:    • Difficulty of Paying Living Expenses:    Food Insecurity:    • Worried About Running Out of Food in the Last Year:    • Ran Out of Food in the Last Year:    Transportation Needs:    • Lack of Transportation (Medical):    • Lack of Transportation (Non-Medical):    Physical Activity:    • Days of Exercise per Week:    • Minutes of Exercise per Session:    Stress:    • Feeling of Stress :    Social Connections:    • Frequency of Communication with Friends and Family:    • Frequency of Social Gatherings with Friends and Family:    • Attends Yazidi Services:    • Active Member of Clubs or Organizations:    • Attends Club or Organization Meetings:    • Marital Status:    Intimate Partner Violence:    • Fear of Current or Ex-Partner:    • Emotionally Abused:    • Physically Abused:    • Sexually Abused:        SURGICAL HISTORY  Past Surgical History:   Procedure Laterality Date   • PB MASTECTOMY, PARTIAL Right 3/26/2021    Procedure: MASTECTOMY, PARTIAL - ESTEBAN LOCALIZED;  Surgeon: Janice Knowles M.D.;  Location: SURGERY SAME DAY [facility];  Service: General   •  "AXILLARY NODE DISSECTION Right 3/26/2021    Procedure: LYMPHADENECTOMY, AXILLARY.;  Surgeon: Janice Knowles M.D.;  Location: SURGERY SAME DAY [facility];  Service: General   • GASTROSCOPY-ENDO N/A 10/3/2018    Procedure: GASTROSCOPY-ENDO;  Surgeon: Ben Negro M.D.;  Location: ENDOSCOPY [facility];  Service: Gastroenterology   • CYSTOSCOPY STENT PLACEMENT  11/9/2010    Performed by ANGEL HARRIS at SURGERY [facility]   • URETEROSCOPY  11/9/2010    Performed by ANGEL HARRIS at SURGERY [facility]   • LASERTRIPSY  11/9/2010    Performed by ANGEL HARRIS at SURGERY [facility]   • STENT REMOVAL  11/9/2010    Performed by ANGEL HARRIS at SURGERY [facility]   • CYSTO STENT PLACEMNT PRE SURG  10/7/2010    Performed by ANGEL HARRIS at SURGERY [facility]   • HERNIA REPAIR  1977       CURRENT MEDICATIONS  Home Medications     Reviewed by Bart Hansen R.N. (Registered Nurse) on 06/14/21 at 2016  Med List Status: Partial   Medication Last Dose Status   acetaminophen (TYLENOL) 325 MG Tab  Active   chlorhexidine (PERIDEX) 0.12 % Solution  Active   clonazePAM (KLONOPIN) 1 MG Tab  Active   DIPHENHIST 25 MG capsule  Active   fluoxetine (PROZAC) 40 MG capsule  Active   flurazepam (DALMANE) 30 MG capsule  Active   gabapentin (NEURONTIN) 300 MG Cap  Active   lisinopril (PRINIVIL) 10 MG Tab  Active   loratadine (CLARITIN) 10 MG Tab  Active   Multiple Vitamin (MULTIVITAMIN ADULT PO)  Active   NARCAN 4 MG/0.1ML Liquid  Active   omeprazole (PRILOSEC) 20 MG delayed-release capsule  Active   ondansetron (ZOFRAN ODT) 4 MG TABLET DISPERSIBLE  Active   prochlorperazine (COMPAZINE) 10 MG Tab  Active                ALLERGIES  Allergies   Allergen Reactions   • Sulfa Drugs Vomiting   • Toradol Vomiting       PHYSICAL EXAM  VITAL SIGNS: /88   Pulse (!) 114   Temp 36 °C (96.8 °F) (Temporal)   Resp 14   Ht 1.651 m (5' 5\")   Wt 83.1 kg (183 lb 3.2 oz)   LMP 11/02/2015   SpO2 97%   BMI " 30.49 kg/m²       Constitutional: Chronically ill in appearance  HENT: Normocephalic, Atraumatic, Bilateral external ears normal, Oropharynx dry, No oral exudates, Nose normal.   Eyes: PERRLA, EOMI, Conjunctiva normal, No discharge.   Neck: Normal range of motion, No tenderness, Supple, No stridor.   Lymphatic: No lymphadenopathy noted.   Cardiovascular: Normal heart rate, Normal rhythm, No murmurs, No rubs, No gallops.   Thorax & Lungs: Normal breath sounds, No respiratory distress, No wheezing, No chest tenderness.   Abdomen: Bowel sounds normal, Soft, No tenderness, No masses, No pulsatile masses.   Skin: Mild erythema in the periumbilical region, and apparent fungal dermatitis to the right inguinal fold with some macerated tissue.   Back: No tenderness, No CVA tenderness.    Extremities: Intact distal pulses, No edema, No tenderness, No cyanosis, No clubbing.   Neurologic: Alert & oriented x 3, Normal motor function, Normal sensory function, No focal deficits noted.   Psychiatric: Affect normal, Judgment normal, Mood normal.     Results for orders placed or performed during the hospital encounter of 06/14/21   Lactic acid (lactate)   Result Value Ref Range    Lactic Acid 1.2 0.5 - 2.0 mmol/L   CBC WITH DIFFERENTIAL   Result Value Ref Range    WBC 2.7 (L) 4.8 - 10.8 K/uL    RBC 3.58 (L) 4.20 - 5.40 M/uL    Hemoglobin 10.1 (L) 12.0 - 16.0 g/dL    Hematocrit 31.6 (L) 37.0 - 47.0 %    MCV 88.3 81.4 - 97.8 fL    MCH 28.2 27.0 - 33.0 pg    MCHC 32.0 (L) 33.6 - 35.0 g/dL    RDW 69.4 (H) 35.9 - 50.0 fL    Platelet Count 184 164 - 446 K/uL    MPV 11.1 9.0 - 12.9 fL    Neutrophils-Polys 66.10 44.00 - 72.00 %    Lymphocytes 10.20 (L) 22.00 - 41.00 %    Monocytes 8.50 0.00 - 13.40 %    Eosinophils 7.60 (H) 0.00 - 6.90 %    Basophils 2.50 (H) 0.00 - 1.80 %    Nucleated RBC 0.00 /100 WBC    Neutrophils (Absolute) 1.92 (L) 2.00 - 7.15 K/uL    Lymphs (Absolute) 0.28 (L) 1.00 - 4.80 K/uL    Monos (Absolute) 0.23 0.00 - 0.85 K/uL     Eos (Absolute) 0.21 0.00 - 0.51 K/uL    Baso (Absolute) 0.07 0.00 - 0.12 K/uL    NRBC (Absolute) 0.00 K/uL    Anisocytosis 1+     Macrocytosis 1+     Microcytosis 1+    COMP METABOLIC PANEL   Result Value Ref Range    Sodium 138 135 - 145 mmol/L    Potassium 3.2 (L) 3.6 - 5.5 mmol/L    Chloride 103 96 - 112 mmol/L    Co2 22 20 - 33 mmol/L    Anion Gap 13.0 7.0 - 16.0    Glucose 104 (H) 65 - 99 mg/dL    Bun 15 8 - 22 mg/dL    Creatinine 0.90 0.50 - 1.40 mg/dL    Calcium 9.0 8.5 - 10.5 mg/dL    AST(SGOT) 28 12 - 45 U/L    ALT(SGPT) 23 2 - 50 U/L    Alkaline Phosphatase 118 (H) 30 - 99 U/L    Total Bilirubin 0.2 0.1 - 1.5 mg/dL    Albumin 3.8 3.2 - 4.9 g/dL    Total Protein 6.7 6.0 - 8.2 g/dL    Globulin 2.9 1.9 - 3.5 g/dL    A-G Ratio 1.3 g/dL   URINALYSIS    Specimen: Urine   Result Value Ref Range    Color Yellow     Character Clear     Specific Gravity 1.024 <1.035    Ph 5.5 5.0 - 8.0    Glucose Negative Negative mg/dL    Ketones Trace (A) Negative mg/dL    Protein Negative Negative mg/dL    Bilirubin Negative Negative    Urobilinogen, Urine 0.2 Negative    Nitrite Negative Negative    Leukocyte Esterase Trace (A) Negative    Occult Blood Trace (A) Negative    Micro Urine Req Microscopic    URINE MICROSCOPIC (W/UA)   Result Value Ref Range    WBC 2-5 /hpf    RBC 5-10 (A) /hpf    Bacteria Negative None /hpf    Epithelial Cells Negative /hpf   ESTIMATED GFR   Result Value Ref Range    GFR If African American >60 >60 mL/min/1.73 m 2    GFR If Non African American >60 >60 mL/min/1.73 m 2   DIFFERENTIAL MANUAL   Result Value Ref Range    Bands-Stabs 5.10 0.00 - 10.00 %    Manual Diff Status PERFORMED    PERIPHERAL SMEAR REVIEW   Result Value Ref Range    Peripheral Smear Review see below    PLATELET ESTIMATE   Result Value Ref Range    Plt Estimation See Note    MORPHOLOGY   Result Value Ref Range    RBC Morphology Present     Toxic Gran Moderate    CREATINE KINASE   Result Value Ref Range    CPK Total 39 0 - 154 U/L          RADIOLOGY/PROCEDURES  DX-CHEST-PORTABLE (1 VIEW)   Final Result         1.  No acute cardiopulmonary disease.            COURSE & MEDICAL DECISION MAKING  Pertinent Labs & Imaging studies reviewed. (See chart for details)  This a 60-year-old female who presents with a rash.  The rash has more of a fungal dermatitis appearance than from a medication reaction.  She does have a mild leukopenia from her chemotherapy and this could be making her immunocompromised and susceptible to the fungal infection.  Clinically she does not appear toxic nor septic.  She has had a little bit of a hoarse voice as well as a cough and a chest x-ray is ordered not show any evidence of a focal process.  Patient states she has lot of pain in her thighs and I did order a total CK to make sure there is no evidence of myositis and this was negative.  The patient did receive a liter of fluid as she was slightly tachycardic on arrival and appeared dehydrated.  She did receive fentanyl for pain control as well as a liter of fluid she does feel better.  I did speak with her oncologist and they agree with outpatient management with antifungal cream, hydration, and Tylenol.  She will return if she is acutely worse.  As for the hoarse voice this could also be from some dehydration versus a viral process.      FINAL IMPRESSION  1.  Fungal dermatitis right groin  2.  Immunocompromised secondary to chemotherapy  3.  Mild dehydration  4.  Stable anemia    Disposition  The patient will be discharged in stable condition      Electronically signed by: Chrsis Loomis M.D., 6/14/2021 10:10 PM

## 2021-06-17 LAB
BACTERIA UR CULT: NORMAL
SIGNIFICANT IND 70042: NORMAL
SITE SITE: NORMAL
SOURCE SOURCE: NORMAL

## 2021-06-18 RX ORDER — 0.9 % SODIUM CHLORIDE 0.9 %
VIAL (ML) INJECTION PRN
Status: CANCELLED | OUTPATIENT
Start: 2021-06-19

## 2021-06-18 RX ORDER — 0.9 % SODIUM CHLORIDE 0.9 %
10 VIAL (ML) INJECTION PRN
Status: CANCELLED | OUTPATIENT
Start: 2021-06-19

## 2021-06-18 RX ORDER — SODIUM CHLORIDE 9 MG/ML
1000 INJECTION, SOLUTION INTRAVENOUS ONCE
Status: CANCELLED | OUTPATIENT
Start: 2021-06-19 | End: 2021-06-19

## 2021-06-18 RX ORDER — HEPARIN SODIUM (PORCINE) LOCK FLUSH IV SOLN 100 UNIT/ML 100 UNIT/ML
500 SOLUTION INTRAVENOUS PRN
Status: CANCELLED | OUTPATIENT
Start: 2021-06-19

## 2021-06-18 RX ORDER — 0.9 % SODIUM CHLORIDE 0.9 %
3 VIAL (ML) INJECTION PRN
Status: CANCELLED | OUTPATIENT
Start: 2021-06-19

## 2021-06-30 RX ORDER — ONDANSETRON 2 MG/ML
4 INJECTION INTRAMUSCULAR; INTRAVENOUS PRN
Status: CANCELLED | OUTPATIENT
Start: 2021-07-05

## 2021-06-30 RX ORDER — PROCHLORPERAZINE MALEATE 10 MG
10 TABLET ORAL EVERY 6 HOURS PRN
Status: CANCELLED | OUTPATIENT
Start: 2021-07-05

## 2021-06-30 RX ORDER — 0.9 % SODIUM CHLORIDE 0.9 %
10 VIAL (ML) INJECTION PRN
Status: CANCELLED | OUTPATIENT
Start: 2021-07-05

## 2021-06-30 RX ORDER — 0.9 % SODIUM CHLORIDE 0.9 %
VIAL (ML) INJECTION PRN
Status: CANCELLED | OUTPATIENT
Start: 2021-07-05

## 2021-06-30 RX ORDER — ONDANSETRON 8 MG/1
8 TABLET, ORALLY DISINTEGRATING ORAL PRN
Status: CANCELLED | OUTPATIENT
Start: 2021-07-05

## 2021-06-30 RX ORDER — 0.9 % SODIUM CHLORIDE 0.9 %
3 VIAL (ML) INJECTION PRN
Status: CANCELLED | OUTPATIENT
Start: 2021-07-05

## 2021-06-30 RX ORDER — SODIUM CHLORIDE 9 MG/ML
INJECTION, SOLUTION INTRAVENOUS CONTINUOUS
Status: CANCELLED | OUTPATIENT
Start: 2021-07-05

## 2021-06-30 RX ORDER — HEPARIN SODIUM (PORCINE) LOCK FLUSH IV SOLN 100 UNIT/ML 100 UNIT/ML
500 SOLUTION INTRAVENOUS PRN
Status: CANCELLED | OUTPATIENT
Start: 2021-07-05

## 2021-07-05 ENCOUNTER — OUTPATIENT INFUSION SERVICES (OUTPATIENT)
Dept: ONCOLOGY | Facility: MEDICAL CENTER | Age: 61
End: 2021-07-05
Attending: INTERNAL MEDICINE
Payer: MEDICAID

## 2021-07-05 VITALS
DIASTOLIC BLOOD PRESSURE: 71 MMHG | HEART RATE: 88 BPM | HEIGHT: 64 IN | OXYGEN SATURATION: 95 % | RESPIRATION RATE: 18 BRPM | TEMPERATURE: 98.4 F | WEIGHT: 186.51 LBS | SYSTOLIC BLOOD PRESSURE: 136 MMHG | BODY MASS INDEX: 31.84 KG/M2

## 2021-07-05 DIAGNOSIS — Z17.1 MALIGNANT NEOPLASM OF CENTRAL PORTION OF RIGHT BREAST IN FEMALE, ESTROGEN RECEPTOR NEGATIVE (HCC): ICD-10-CM

## 2021-07-05 DIAGNOSIS — C50.111 MALIGNANT NEOPLASM OF CENTRAL PORTION OF RIGHT BREAST IN FEMALE, ESTROGEN RECEPTOR NEGATIVE (HCC): ICD-10-CM

## 2021-07-05 LAB
ALBUMIN SERPL BCP-MCNC: 4.2 G/DL (ref 3.2–4.9)
ALBUMIN/GLOB SERPL: 1.4 G/DL
ALP SERPL-CCNC: 123 U/L (ref 30–99)
ALT SERPL-CCNC: 18 U/L (ref 2–50)
ANION GAP SERPL CALC-SCNC: 14 MMOL/L (ref 7–16)
ANISOCYTOSIS BLD QL SMEAR: ABNORMAL
AST SERPL-CCNC: 38 U/L (ref 12–45)
BASOPHILS # BLD AUTO: 0.7 % (ref 0–1.8)
BASOPHILS # BLD: 0.05 K/UL (ref 0–0.12)
BILIRUB SERPL-MCNC: 0.2 MG/DL (ref 0.1–1.5)
BUN SERPL-MCNC: 11 MG/DL (ref 8–22)
CALCIUM SERPL-MCNC: 9.5 MG/DL (ref 8.5–10.5)
CHLORIDE SERPL-SCNC: 107 MMOL/L (ref 96–112)
CO2 SERPL-SCNC: 18 MMOL/L (ref 20–33)
COMMENT 1642: NORMAL
CREAT SERPL-MCNC: 0.62 MG/DL (ref 0.5–1.4)
EOSINOPHIL # BLD AUTO: 0.01 K/UL (ref 0–0.51)
EOSINOPHIL NFR BLD: 0.1 % (ref 0–6.9)
ERYTHROCYTE [DISTWIDTH] IN BLOOD BY AUTOMATED COUNT: 69.9 FL (ref 35.9–50)
GLOBULIN SER CALC-MCNC: 3 G/DL (ref 1.9–3.5)
GLUCOSE SERPL-MCNC: 152 MG/DL (ref 65–99)
HCT VFR BLD AUTO: 39.3 % (ref 37–47)
HGB BLD-MCNC: 12.3 G/DL (ref 12–16)
IMM GRANULOCYTES # BLD AUTO: 0.04 K/UL (ref 0–0.11)
IMM GRANULOCYTES NFR BLD AUTO: 0.6 % (ref 0–0.9)
LYMPHOCYTES # BLD AUTO: 0.48 K/UL (ref 1–4.8)
LYMPHOCYTES NFR BLD: 6.8 % (ref 22–41)
MCH RBC QN AUTO: 29.8 PG (ref 27–33)
MCHC RBC AUTO-ENTMCNC: 31.3 G/DL (ref 33.6–35)
MCV RBC AUTO: 95.2 FL (ref 81.4–97.8)
MICROCYTES BLD QL SMEAR: ABNORMAL
MONOCYTES # BLD AUTO: 0.06 K/UL (ref 0–0.85)
MONOCYTES NFR BLD AUTO: 0.8 % (ref 0–13.4)
MORPHOLOGY BLD-IMP: NORMAL
NEUTROPHILS # BLD AUTO: 6.46 K/UL (ref 2–7.15)
NEUTROPHILS NFR BLD: 91 % (ref 44–72)
NRBC # BLD AUTO: 0 K/UL
NRBC BLD-RTO: 0 /100 WBC
PLATELET # BLD AUTO: 292 K/UL (ref 164–446)
PMV BLD AUTO: 10.3 FL (ref 9–12.9)
POTASSIUM SERPL-SCNC: 4.1 MMOL/L (ref 3.6–5.5)
PROT SERPL-MCNC: 7.2 G/DL (ref 6–8.2)
RBC # BLD AUTO: 4.13 M/UL (ref 4.2–5.4)
RBC BLD AUTO: PRESENT
SODIUM SERPL-SCNC: 139 MMOL/L (ref 135–145)
WBC # BLD AUTO: 7.1 K/UL (ref 4.8–10.8)

## 2021-07-05 PROCEDURE — 700105 HCHG RX REV CODE 258: Performed by: INTERNAL MEDICINE

## 2021-07-05 PROCEDURE — 85025 COMPLETE CBC W/AUTO DIFF WBC: CPT

## 2021-07-05 PROCEDURE — 700111 HCHG RX REV CODE 636 W/ 250 OVERRIDE (IP): Performed by: INTERNAL MEDICINE

## 2021-07-05 PROCEDURE — 96375 TX/PRO/DX INJ NEW DRUG ADDON: CPT

## 2021-07-05 PROCEDURE — 96413 CHEMO IV INFUSION 1 HR: CPT

## 2021-07-05 PROCEDURE — 80053 COMPREHEN METABOLIC PANEL: CPT

## 2021-07-05 PROCEDURE — 304540 HCHG NITRO SET VENT 2ND TUB

## 2021-07-05 PROCEDURE — 96417 CHEMO IV INFUS EACH ADDL SEQ: CPT

## 2021-07-05 RX ORDER — DEXAMETHASONE 4 MG/1
2 TABLET ORAL 2 TIMES DAILY
Status: ON HOLD | COMMUNITY
End: 2022-01-01

## 2021-07-05 RX ADMIN — DOCETAXEL 146.2 MG: 20 INJECTION, SOLUTION, CONCENTRATE INTRAVENOUS at 16:28

## 2021-07-05 RX ADMIN — ONDANSETRON 16 MG: 2 INJECTION INTRAMUSCULAR; INTRAVENOUS at 15:49

## 2021-07-05 RX ADMIN — DEXAMETHASONE SODIUM PHOSPHATE 12 MG: 4 INJECTION, SOLUTION INTRA-ARTICULAR; INTRALESIONAL; INTRAMUSCULAR; INTRAVENOUS; SOFT TISSUE at 16:05

## 2021-07-05 RX ADMIN — CYCLOPHOSPHAMIDE 1170 MG: 1 INJECTION, POWDER, FOR SOLUTION INTRAVENOUS; ORAL at 17:33

## 2021-07-05 ASSESSMENT — FIBROSIS 4 INDEX: FIB4 SCORE: 1.84

## 2021-07-05 NOTE — PROGRESS NOTES
"Pharmacy Chemotherapy Verification  Patient Name: Ashleigh Marquis   Dx: invasive ductal carcinoma  Protocol: TC       Regimen:   Docetaxel (Taxotere) 75 mg/m2 IV over 30 min Day 1  Cyclophosphamide 600 mg/m2 IV over 30 minutes Day 1   Q21day cycles x4 cycles  *Dosing Reference*  Docetaxel With Cyclophosphamide Is Associated With an Overall Survival Benefit Compared With Doxorubicin and Cyclophosphamide: 7-Year Follow-Up of  Oncology Research Trial 9735.    Allergies:  Sulfa drugs and Toradol     /71   Pulse 88   Temp 36.9 °C (98.4 °F) (Temporal)   Resp 18   Ht 1.615 m (5' 3.58\")   Wt 84.6 kg (186 lb 8.2 oz)   LMP 11/02/2015   SpO2 95%   BMI 32.44 kg/m²  Body surface area is 1.95 meters squared.    Labs: 7/5/21  Meet treatment parameters    ER/AR neg  HER2 1+     Drug Order   (Drug name, dose, route, IV Fluid & volume, frequency, number of doses) Cycle 4    Previous treatment: 6/9/21      Medication = DOCEtaxel (Taxotere)  Base Dose= 75 mg/m2  Calc Dose: Base Dose x 1.95 m2 = 146.25 mg  Final Dose = 146.2 mg  Route = IV  Fluid & Volume =  mL  Final [conc] ~ 0.57 mg/mL   Admin Duration = Over 60 minutes            Okay to treat with final dose   Medication = Cyclophosphamide (Cytoxan)  Base Dose= 600 mg/m2  Calc Dose: Base Dose x 1.95 m2 = 1170 mg  Final Dose = 1170 mg  Route = IV  Fluid & Volume =  mL  Admin Duration = Over 30 minutes            Okay to treat with final dose       "

## 2021-07-05 NOTE — PROGRESS NOTES
"Pharmacy Chemotherapy Verification    Patient Name: Ashleigh Marquis   Dx: invasive ductal carcinoma- ER/NM neg, HER2 1+    Cycle 4- delayed about 6 days  Previous treatment = 6/9/21    Protocol: TC     Docetaxel (Taxotere) 75 mg/m2 IV over 30 min Day 1  Cyclophosphamide 600 mg/m2 IV over 30 minutes Day 1  Q21day cycles x4 cycles  *Dosing Reference*  Docetaxel With Cyclophosphamide Is Associated With an Overall Survival Benefit Compared With Doxorubicin and Cyclophosphamide: 7-Year Follow-Up of  Oncology Research Trial 9735.    Allergies:  Sulfa drugs and Toradol     /71   Pulse 88   Temp 36.9 °C (98.4 °F) (Temporal)   Resp 18   Ht 1.615 m (5' 3.58\")   Wt 84.6 kg (186 lb 8.2 oz)   LMP 11/02/2015   SpO2 95%   BMI 32.44 kg/m²  Body surface area is 1.95 meters squared.     All lab results 7/5/21 within treatment parameters.       Docetaxel (Taxotere) 75 mg/m2 x 1.95m2 = 146.3mg  <10% difference, ok to treat with final dose = 146.2mg IV      Cyclophosphamide 600 mg/m2 x 1.95m2 = 1170mg  <10% difference, ok to treat with final dose = 1170mg IV     Leela Torres, PharmD    "

## 2021-07-05 NOTE — PROGRESS NOTES
Chemotherapy Verification - SECONDARY RN       Height = 63.58in  Weight = 84.6kg  BSA = 1.94m2       Medication: Taxotere  Dose: 75mg/m2  Calculated Dose: 145.5mg                             (In mg/m2, AUC, mg/kg)     Medication: Cytoxan  Dose: 600mg/m2  Calculated Dose: 1164mg                             (In mg/m2, AUC, mg/kg)        I confirm that this process was performed independently.

## 2021-07-05 NOTE — PROGRESS NOTES
Chemotherapy Verification - PRIMARY RN      Height = 161.5 cm  Weight = 84.6 kg  BSA = 1.948 m2       Medication: Taxotere  Dose: 75 mg/m2  Calculated Dose: 146.1 mg                             (In mg/m2, AUC, mg/kg)     Medication: Cytoxan  Dose: 600 mg/m2  Calculated Dose: 1168.8 mg                             (In mg/m2, AUC, mg/kg)      I confirm this process was performed independently with the BSA and all final chemotherapy dosing calculations congruent.  Any discrepancies of 10% or greater have been addressed with the chemotherapy pharmacist. The resolution of the discrepancy has been documented in the EPIC progress notes.

## 2021-07-06 ENCOUNTER — DOCUMENTATION (OUTPATIENT)
Dept: ONCOLOGY | Facility: MEDICAL CENTER | Age: 61
End: 2021-07-06

## 2021-07-06 NOTE — PROGRESS NOTES
"Nutrition Services: Brief Update  Wt Readings from Last 7 Encounters:   07/05/21 84.6 kg (186 lb 8.2 oz)   06/14/21 83.1 kg (183 lb 3.2 oz)   06/10/21 84.6 kg (186 lb 8.2 oz)   06/09/21 84.6 kg (186 lb 8.2 oz)   06/07/21 85.7 kg (188 lb 15 oz)   05/18/21 85.5 kg (188 lb 7.9 oz)   05/17/21 86.3 kg (190 lb 4.1 oz)     Weight Change: wt remaining stable    RD received message from Aidan IRENE, stating pt interested in supplements as pt states does not receive \"good food\" from [facility]. States pt will be in \[facility] [place] [facility]\"" at 6pm, when RD is not on site, though pt will be with TETO Pedraza.    RD provided sample pack of Ensure max protein, though pt would benefit from other resources for food options, given supplements should be used to supplement intake and not necessarily replace food when not indicated. RD left note on supplements for pt to have KOLTON Huynh from [facility] rech out to RD so that RD can be of further assistance. Of note, GRAYSON has contacted Amina and has not received response back.     RD remains available as needed  [phone]    "

## 2021-07-07 ENCOUNTER — OUTPATIENT INFUSION SERVICES (OUTPATIENT)
Dept: ONCOLOGY | Facility: MEDICAL CENTER | Age: 61
End: 2021-07-07
Attending: INTERNAL MEDICINE
Payer: MEDICAID

## 2021-07-07 VITALS
TEMPERATURE: 97.6 F | HEART RATE: 73 BPM | SYSTOLIC BLOOD PRESSURE: 122 MMHG | OXYGEN SATURATION: 96 % | DIASTOLIC BLOOD PRESSURE: 71 MMHG | RESPIRATION RATE: 18 BRPM

## 2021-07-07 DIAGNOSIS — Z17.1 MALIGNANT NEOPLASM OF CENTRAL PORTION OF RIGHT BREAST IN FEMALE, ESTROGEN RECEPTOR NEGATIVE (HCC): ICD-10-CM

## 2021-07-07 DIAGNOSIS — C50.111 MALIGNANT NEOPLASM OF CENTRAL PORTION OF RIGHT BREAST IN FEMALE, ESTROGEN RECEPTOR NEGATIVE (HCC): ICD-10-CM

## 2021-07-07 PROCEDURE — 96372 THER/PROPH/DIAG INJ SC/IM: CPT

## 2021-07-07 PROCEDURE — 700111 HCHG RX REV CODE 636 W/ 250 OVERRIDE (IP): Performed by: INTERNAL MEDICINE

## 2021-07-07 RX ADMIN — PEGFILGRASTIM-CBQV 6 MG: 6 INJECTION, SOLUTION SUBCUTANEOUS at 16:48

## 2021-07-26 ENCOUNTER — HOSPITAL ENCOUNTER (OUTPATIENT)
Dept: RADIATION ONCOLOGY | Facility: MEDICAL CENTER | Age: 61
End: 2021-07-31
Attending: RADIOLOGY
Payer: MEDICAID

## 2021-07-26 ENCOUNTER — APPOINTMENT (OUTPATIENT)
Dept: ONCOLOGY | Facility: MEDICAL CENTER | Age: 61
End: 2021-07-26
Attending: INTERNAL MEDICINE
Payer: MEDICAID

## 2021-07-26 VITALS
SYSTOLIC BLOOD PRESSURE: 128 MMHG | HEART RATE: 91 BPM | TEMPERATURE: 96.8 F | OXYGEN SATURATION: 96 % | DIASTOLIC BLOOD PRESSURE: 81 MMHG

## 2021-07-26 DIAGNOSIS — C50.211 MALIGNANT NEOPLASM OF UPPER-INNER QUADRANT OF RIGHT BREAST IN FEMALE, ESTROGEN RECEPTOR NEGATIVE (HCC): ICD-10-CM

## 2021-07-26 DIAGNOSIS — Z17.1 MALIGNANT NEOPLASM OF UPPER-INNER QUADRANT OF RIGHT BREAST IN FEMALE, ESTROGEN RECEPTOR NEGATIVE (HCC): ICD-10-CM

## 2021-07-26 PROCEDURE — 99205 OFFICE O/P NEW HI 60 MIN: CPT | Performed by: RADIOLOGY

## 2021-07-26 PROCEDURE — 99214 OFFICE O/P EST MOD 30 MIN: CPT | Performed by: RADIOLOGY

## 2021-07-26 RX ORDER — ALBUTEROL SULFATE 90 UG/1
2 AEROSOL, METERED RESPIRATORY (INHALATION)
COMMUNITY
Start: 2021-06-02 | End: 2022-01-01 | Stop reason: SDUPTHER

## 2021-07-26 RX ORDER — GABAPENTIN 400 MG/1
CAPSULE ORAL
COMMUNITY
Start: 2021-05-25 | End: 2022-01-01

## 2021-07-26 RX ORDER — BUPRENORPHINE 10 UG/H
PATCH TRANSDERMAL
Status: ON HOLD | COMMUNITY
Start: 2021-06-20 | End: 2022-01-01

## 2021-07-26 RX ORDER — LISINOPRIL 20 MG/1
TABLET ORAL
Status: ON HOLD | COMMUNITY
Start: 2021-05-21 | End: 2022-01-01

## 2021-07-26 ASSESSMENT — PAIN SCALES - GENERAL: PAINLEVEL: 8=MODERATE-SEVERE PAIN

## 2021-07-26 NOTE — CONSULTS
RADIATION ONCOLOGY CONSULT    DATE OF SERVICE: 7/26/2021    IDENTIFICATION: A 60 y.o. female with T2N0 triple negative grade 3 right breast cancer status post lumpectomy and sentinel node dissection and TC chemotherapy.  She is here at the kind request of Dr. Janice Knowles.      HISTORY OF PRESENT ILLNESS: Patient's history dates back to January of this year when she noted a palpable mass in her right breast. On 1/20/2021 she was noted to have at the 2 o'clock position in the right breast by ultrasound a 1.1 x 1 x 1.3 cm mass there was no axillary adenopathy. Biopsy 2/8/2021 revealed a grade 3 triple negative breast cancer.  She underwent a CT scan of the chest abdomen and pelvis 2/19/2021 revealing a 1.8 cm right breast mass corresponding to the mammographic findings.  There was no axillary adenopathy no sclerotic or suspicious bone lesions no lung nodules.  Subcentimeter right lobe of the liver low-attenuation structure too small to characterize. She underwent a lumpectomy and sentinel node dissection on 3/26/2021 tumor was 2.9 cm grade 3 tumor was present 1 mm from the inferior margin in the form of LVI over breath of less than 1 mm there was extensive lymphovascular invasion.  3 sentinel nodes were taken out all were negative.She then underwent TC x4 last therapy was 7/7/2021.  She is here to discuss radiation therapy to decrease her chance of local regional recurrence.    PAST MEDICAL HISTORY:   Past Medical History:   Diagnosis Date   • Alcoholism (HCC)    • Anemia     pt states she doesn't think it is a current issue   • Anxiety    • Anxiety and depression 03/23/2021   • Arthritis     osteo-legs, knees   • ASTHMA    • Cancer (HCC) 1981    cervical   • Chronic low back pain    • Congestive heart failure (HCC)    • Dental disorder     upper and lower dentures   • Depression    • EtOH dependence (HCC)    • Fall     alcohol related   • GERD (gastroesophageal reflux disease)    • Heart burn    • HTN    •  "Hypertension    • Indigestion     GERD   • Muscle disorder    • OSTEOPOROSIS    • Other specified symptom associated with female genital organs     \"I have fibroids bad\"\"   • Pain 2021    breast, legs, joints   • Psychiatric disorder    • PTSD (post-traumatic stress disorder)    • Renal disorder     Hx of stones   • Seizure (HCC)     several years ago r/t alcohol withdrawl   • Seizure (HCC) 2020    last seizure was 2020   • Ulcer    • Vitamin D deficiency        PAST SURGICAL HISTORY:  Past Surgical History:   Procedure Laterality Date   • PB MASTECTOMY, PARTIAL Right 3/26/2021    Procedure: MASTECTOMY, PARTIAL - ESTEBAN LOCALIZED;  Surgeon: Janice Knowles M.D.;  Location: SURGERY SAME DAY [facility];  Service: General   • AXILLARY NODE DISSECTION Right 3/26/2021    Procedure: LYMPHADENECTOMY, AXILLARY.;  Surgeon: Janice Knowles M.D.;  Location: SURGERY SAME DAY [facility];  Service: General   • GASTROSCOPY-ENDO N/A 10/3/2018    Procedure: GASTROSCOPY-ENDO;  Surgeon: Ben Negro M.D.;  Location: ENDOSCOPY [facility];  Service: Gastroenterology   • CYSTOSCOPY STENT PLACEMENT  2010    Performed by ANGEL HARRIS at SURGERY [facility]   • URETEROSCOPY  2010    Performed by ANGEL HARRIS at SURGERY [facility]   • LASERTRIPSY  2010    Performed by ANGEL HARRIS at SURGERY [facility]   • STENT REMOVAL  2010    Performed by ANGEL HARRIS at SURGERY [facility]   • CYSTO STENT PLACEMNT PRE SURG  10/7/2010    Performed by ANGEL HARRIS at SURGERY [facility]   • HERNIA REPAIR         GYNECOLOGICAL STATUS:   A3 last menstrual period in her 40s.  She had 2 years of birth control pill use    CURRENT MEDICATIONS:  Current Outpatient Medications   Medication Sig Dispense Refill   • Buprenorphine 10 MCG/HR PATCH WEEKLY APPLY 1 PATCH TRANSDERMAL ONCE EVERY 7 DAYS     • VENTOLIN  (90 Base) MCG/ACT Aero Soln inhalation aerosol      • lisinopril " (PRINIVIL) 20 MG Tab      • gabapentin (NEURONTIN) 400 MG Cap      • dexamethasone (DECADRON) 4 MG Tab Take 2 mg by mouth 2 times a day.     • acetaminophen (TYLENOL) 325 MG Tab Take 650 mg by mouth every four hours as needed.     • Multiple Vitamin (MULTIVITAMIN ADULT PO) Take  by mouth.     • DIPHENHIST 25 MG capsule  (Patient not taking: Reported on 5/17/2021)     • chlorhexidine (PERIDEX) 0.12 % Solution SWISH 15 ML IN MOUTH FOR 3O SECONDS THEN SPIT TWICE DAILY     • flurazepam (DALMANE) 30 MG capsule TAKE 1 CAPSULE BY MOUTH EVERY DAY AT BEDTIME F51.01     • loratadine (CLARITIN) 10 MG Tab      • NARCAN 4 MG/0.1ML Liquid ADMINISTER A SINGLE SPRAY INTRANASALLY INTO ONE NOSTRIL. CALL 911. MAY REPEAT X1. (Patient not taking: Reported on 7/5/2021)     • prochlorperazine (COMPAZINE) 10 MG Tab      • clonazePAM (KLONOPIN) 1 MG Tab Take 1 mg by mouth 2 Times a Day.     • lisinopril (PRINIVIL) 10 MG Tab Take 10 mg by mouth every day. (Patient not taking: Reported on 7/5/2021)     • gabapentin (NEURONTIN) 300 MG Cap Take 300 mg by mouth 3 times a day.     • fluoxetine (PROZAC) 40 MG capsule Take 40 mg by mouth every day.     • ondansetron (ZOFRAN ODT) 4 MG TABLET DISPERSIBLE Take 1 Tab by mouth every 6 hours as needed for Nausea. 20 Tab 0   • omeprazole (PRILOSEC) 20 MG delayed-release capsule Take 2 Caps by mouth 2 Times a Day. 30 Cap 0     No current facility-administered medications for this encounter.       ALLERGIES:    Sulfa drugs and Toradol    FAMILY HISTORY:    Family History   Problem Relation Age of Onset   • Heart Disease Father    • Hypertension Father    • Diabetes Father    • Cancer Paternal Grandmother    • Depression Other    • Lung Disease Neg Hx    • Stroke Neg Hx    [unfilled]        SOCIAL HISTORY:     reports that she has been smoking cigarettes. She has a 10.00 pack-year smoking history. She has never used smokeless tobacco. She reports current alcohol use. She reports that she does not use  drugs.  She has issues with homelessness but right now is in a shelterShe states she has been off alcohol for 8 months and 23 days.    REVIEW OF SYSTEMS: Pertinent positives consist of  Patient has scar pain in the right breast.  She has had change in weight appetite fatigue issues.  She has hot flashes she has thyroid disease.  Since chemo she has had alteration in taste dry mouth hoarseness dental issues.  She has hearing loss occasional vertigo blurred vision visual difficulties diarrhea nausea vomiting hemorrhoids.  She has nocturia muscle pain joint pain joint swelling neck lumps neck pain neck stiffness headaches dizziness unsteady gait neuropathy memory loss shortness of breath wheezing depression anxiety all of which are chronic.  The rest of the review of systems is negative and has been reviewed.     PAIN: Patient has a little discomfort in the right breast      PHYSICAL EXAM:    1= Restricted in physically strenuous activity, but ambulatory and able to carry out work of a light sedentary nature, e.g., light housework, office work.  Vitals:    07/26/21 1434   BP: 128/81   Pulse: 91   Temp: 36 °C (96.8 °F)   SpO2: 96%   Pain Score: 8=Moderate-Severe Pain        GENERAL: Well-appearing alert and oriented x3 in no apparent distress  Breasts: Pendulous bilaterally fibroglandular changes in both breasts. No masses appreciated in either breast or axilla  HEENT:  Pupils are equal, round, and reactive to light.  Extraocular muscles   are intact. Sclerae nonicteric.  Conjunctivae pink.  Oral cavity, tongue   protrudes midline.   NECK:   No peripheral adenopathy of the neck, supraclavicular fossa or axillae   bilaterally.  LUNGS:  Clear to ascultation   HEART:  Regular rate and rhythm.  No murmur appreciated  ABDOMEN:  Soft. No evidence of hepatosplenomegaly.    EXTREMITIES:  Without Edema.  NEUROLOGIC:  Cranial nerves II through XII were intact. Normal stance and gait motor and sensory grossly within normal  limits          IMPRESSION:    A 60 y.o. with  Grade 3 triple negative breast cancer status post lumpectomy and sentinel node dissection of the right breast.  She is also status post TC x4..      RECOMMENDATIONS:   I explained to the patient she is definitely candidate for radiation therapy to decrease her chance of local regional recurrence.  I am recommending a total of 4 weeks of treatment 3 weeks to the whole breast +5-day boost to the tumor bed because of the extensive lymph vascular vessel invasion and close margin.  I've described the details of radiation along with the side effects both acute and chronic, including but not exclusive to fatigue, skin reaction, local soreness, swelling, delayed healing, scarring fibrosis discoloration. Ample time was allowed for questions, and patient understands.    Patient is tentatively scheduled for simulation in a couple weeks to get started soon thereafter.    Thank you for the opportunity to participate in her care.  If any questions or comments, please do not hesitate in calling.    Please note that this dictation was created using voice recognition software. I have made every reasonable attempt to correct obvious errors, but I expect that there are errors of grammar and possibly content that I did not discover before finalizing the note.

## 2021-07-26 NOTE — CT SIMULATION
PATIENT NAME Ashleigh Marquis   PRIMARY PHYSICIAN Estela Correa [id number]   REFERRING PHYSICIAN Hi Valle M.D. 1960     Malignant neoplasm of central portion of breast in female, estrogen receptor negative (HCC)  Staging form: Breast, AJCC 8th Edition  - Pathologic: Stage IIA (pT2, pN0(sn), cM0, G3, ER-, AR-, HER2-) - Signed by Ashleigh Russell M.D. on 4/28/2021  Stage prefix: Initial diagnosis  Method of lymph node assessment: [place] lymph node biopsy  Histologic grading system: 3 grade system         Treatment Planning CT Simulation      Order Questions     Question Answer Comment    Is this for a new course of treatment? Yes     Is this an Addendum? No     Implanted Device/Pacemaker No     Simulation Status Initial     Treatment Technique 3D CRT     Treatment Pattern/Frequency Daily     Concurrent Chemotherapy No     CT Technique 3D possible    Slice Thickness 3mm     Scan Extent Chest     Treatment Device(s) Vac Wendie      Wing Board     Treatment Marker Scar      Breast Borders     Patient Attire Gown     Patient Position Supine     Patient Orientation Head First     Arm Position Up     Treatment Machine No preference     Treatment Image Guidance Ports for boost     CBCT     Frequency (ports) Weekly     Frequency (CBCT) Daily for boost    Image Guidance Match PTV - Soft Tissue     Other Orders Special Tx Procedure      Weekly Physics Check

## 2021-07-26 NOTE — NON-PROVIDER
Patient was seen today in clinic with Dr. Russell for Consult .  Vitals signs and weight were obtained and pain assessment was completed.  Allergies and medications were reviewed with the patient.  Toxicities of treatment assessed.     Vitals/Pain:  Vitals:    07/26/21 1434   BP: 128/81   Pulse: 91   Temp: 36 °C (96.8 °F)   SpO2: 96%   Pain Score: 8=Moderate-Severe Pain        Allergies:   Sulfa drugs and Toradol    Current Medications:  Current Outpatient Medications   Medication Sig Dispense Refill   • dexamethasone (DECADRON) 4 MG Tab Take 2 mg by mouth 2 times a day.     • acetaminophen (TYLENOL) 325 MG Tab Take 650 mg by mouth every four hours as needed.     • Multiple Vitamin (MULTIVITAMIN ADULT PO) Take  by mouth.     • DIPHENHIST 25 MG capsule  (Patient not taking: Reported on 5/17/2021)     • chlorhexidine (PERIDEX) 0.12 % Solution SWISH 15 ML IN MOUTH FOR 3O SECONDS THEN SPIT TWICE DAILY     • flurazepam (DALMANE) 30 MG capsule TAKE 1 CAPSULE BY MOUTH EVERY DAY AT BEDTIME F51.01     • loratadine (CLARITIN) 10 MG Tab      • NARCAN 4 MG/0.1ML Liquid ADMINISTER A SINGLE SPRAY INTRANASALLY INTO ONE NOSTRIL. CALL 911. MAY REPEAT X1. (Patient not taking: Reported on 7/5/2021)     • prochlorperazine (COMPAZINE) 10 MG Tab      • clonazePAM (KLONOPIN) 1 MG Tab Take 1 mg by mouth 2 Times a Day.     • lisinopril (PRINIVIL) 10 MG Tab Take 10 mg by mouth every day. (Patient not taking: Reported on 7/5/2021)     • gabapentin (NEURONTIN) 300 MG Cap Take 300 mg by mouth 3 times a day.     • fluoxetine (PROZAC) 40 MG capsule Take 40 mg by mouth every day.     • ondansetron (ZOFRAN ODT) 4 MG TABLET DISPERSIBLE Take 1 Tab by mouth every 6 hours as needed for Nausea. 20 Tab 0   • omeprazole (PRILOSEC) 20 MG delayed-release capsule Take 2 Caps by mouth 2 Times a Day. 30 Cap 0     No current facility-administered medications for this encounter.         PCP:  Lucas Posada Ass't

## 2021-07-27 ENCOUNTER — APPOINTMENT (OUTPATIENT)
Dept: ONCOLOGY | Facility: MEDICAL CENTER | Age: 61
End: 2021-07-27
Attending: INTERNAL MEDICINE
Payer: MEDICAID

## 2021-07-27 ENCOUNTER — PATIENT OUTREACH (OUTPATIENT)
Dept: OTHER | Facility: MEDICAL CENTER | Age: 61
End: 2021-07-27

## 2021-07-29 ENCOUNTER — PATIENT OUTREACH (OUTPATIENT)
Dept: OTHER | Facility: MEDICAL CENTER | Age: 61
End: 2021-07-29

## 2021-07-29 NOTE — PROGRESS NOTES
Oncology nurse navigator called patient back about needing some dental assistance oncology nurse navigator left my contact information in a voicemail.

## 2021-08-02 ENCOUNTER — HOSPITAL ENCOUNTER (OUTPATIENT)
Dept: RADIATION ONCOLOGY | Facility: MEDICAL CENTER | Age: 61
End: 2021-08-31
Attending: RADIOLOGY
Payer: MEDICAID

## 2021-08-10 ENCOUNTER — HOSPITAL ENCOUNTER (OUTPATIENT)
Dept: RADIATION ONCOLOGY | Facility: MEDICAL CENTER | Age: 61
End: 2021-08-10

## 2021-08-10 PROCEDURE — 77334 RADIATION TREATMENT AID(S): CPT | Performed by: RADIOLOGY

## 2021-08-10 PROCEDURE — 77470 SPECIAL RADIATION TREATMENT: CPT | Performed by: RADIOLOGY

## 2021-08-10 PROCEDURE — 77290 THER RAD SIMULAJ FIELD CPLX: CPT | Performed by: RADIOLOGY

## 2021-08-10 PROCEDURE — 77263 THER RADIOLOGY TX PLNG CPLX: CPT | Performed by: RADIOLOGY

## 2021-08-10 PROCEDURE — 77334 RADIATION TREATMENT AID(S): CPT | Mod: 26 | Performed by: RADIOLOGY

## 2021-08-10 PROCEDURE — 77290 THER RAD SIMULAJ FIELD CPLX: CPT | Mod: 26 | Performed by: RADIOLOGY

## 2021-08-10 PROCEDURE — 77470 SPECIAL RADIATION TREATMENT: CPT | Mod: 26 | Performed by: RADIOLOGY

## 2021-08-13 ENCOUNTER — PATIENT OUTREACH (OUTPATIENT)
Dept: OTHER | Facility: MEDICAL CENTER | Age: 61
End: 2021-08-13

## 2021-08-13 NOTE — PROGRESS NOTES
"On 8-13-21, Oncology Social Worker, Marcela Kam, was forwarded a message from nurse navigation line. ANNABEL Kam contacted pt via phone. Pt reports she is struggling with eating meat and needs more ensure shakes. ANNABEL Kam referred pt to a dietitian and also provided [facility] as another option. Pt thanked ANNABEL Kam and then reports she gets in trouble at [facility] for bringing ensure in. ANNABEL Kam encouraged pt to request a prescription and ask her , if she will allow pt to bring in her ensure. Pt verbalized understanding and agreed to ask.  Pt reports she is in a great deal of pain and needs medication for her pain immediately. Pt reports she was discharged from [facility] due to being on Medicaid. Pt reports, \"eating a ton of ibuprofen and it is not good.\" ANNABEL Kam encouraged pt to reach out to medical team and request medication assistance. Pt reports she did and was denied. Pt reports her friend wants to take her to the emergency room. ANNABEL Kam encouraged pt to do what is best for her and her pain. Pt reports she is going to go to the emergency room and get pain meds.  Pt reports she received two phone calls from nurse navigation about her dental but forgot to call them back. Pt reports she will call her nurse navigator next week to see if they can help her with dental work. Pt reports no one will see her because she is on Medicaid. ANNABEL Kam encouraged pt to also work with her  at [facility] for extra assistance.   Pt reports she is still working with her psychiatrist and taking her medications. ANNABEL Kam thanked pt for sharing.  Pt reports she is on the list for housing and is number 14. Pt reports she is working well with her  and very happy to get her own place soon. ANNABEL Kam thanked pt for sharing.   ANNABEL Kam inquired on other needs at this time. Pt denied.   "

## 2021-08-16 PROCEDURE — 77334 RADIATION TREATMENT AID(S): CPT | Mod: 26 | Performed by: RADIOLOGY

## 2021-08-16 PROCEDURE — 77300 RADIATION THERAPY DOSE PLAN: CPT | Performed by: RADIOLOGY

## 2021-08-16 PROCEDURE — 77295 3-D RADIOTHERAPY PLAN: CPT | Mod: 26 | Performed by: RADIOLOGY

## 2021-08-16 PROCEDURE — 77334 RADIATION TREATMENT AID(S): CPT | Performed by: RADIOLOGY

## 2021-08-16 PROCEDURE — 77300 RADIATION THERAPY DOSE PLAN: CPT | Mod: 26 | Performed by: RADIOLOGY

## 2021-08-16 PROCEDURE — 77295 3-D RADIOTHERAPY PLAN: CPT | Performed by: RADIOLOGY

## 2021-08-17 ENCOUNTER — HOSPITAL ENCOUNTER (OUTPATIENT)
Dept: RADIATION ONCOLOGY | Facility: MEDICAL CENTER | Age: 61
End: 2021-08-17
Payer: MEDICAID

## 2021-08-17 ENCOUNTER — TELEPHONE (OUTPATIENT)
Dept: ONCOLOGY | Facility: MEDICAL CENTER | Age: 61
End: 2021-08-17

## 2021-08-17 NOTE — TELEPHONE ENCOUNTER
Nutrition Services: Telephone Encounter  RD received message from KOLTON Kam stating pt was hoping to get in contact with RD. RD called pt, though pt did not answer. RD left detailed message requesting call back with contact info provided.     RD called [facility] and was transferred to Amina HI. RD unable to reach, though left voicemail detailing needs and contact information for callback. (Amina [phone])    RD available PRN   [phone]

## 2021-08-18 ENCOUNTER — APPOINTMENT (OUTPATIENT)
Dept: RADIATION ONCOLOGY | Facility: MEDICAL CENTER | Age: 61
End: 2021-08-18
Payer: MEDICAID

## 2021-08-18 LAB
CHEMOTHERAPY INFUSION START DATE: NORMAL
CHEMOTHERAPY RECORDS: 2.67
CHEMOTHERAPY RECORDS: 4005
CHEMOTHERAPY RECORDS: NORMAL
CHEMOTHERAPY RX CANCER: NORMAL
DATE 1ST CHEMO CANCER: NORMAL
RAD ONC ARIA COURSE LAST TREATMENT DATE: NORMAL
RAD ONC ARIA COURSE TREATMENT ELAPSED DAYS: NORMAL
RAD ONC ARIA REFERENCE POINT DOSAGE GIVEN TO DATE: 2.67
RAD ONC ARIA REFERENCE POINT DOSAGE GIVEN TO DATE: 2.67
RAD ONC ARIA REFERENCE POINT ID: NORMAL
RAD ONC ARIA REFERENCE POINT ID: NORMAL
RAD ONC ARIA REFERENCE POINT SESSION DOSAGE GIVEN: 2.67
RAD ONC ARIA REFERENCE POINT SESSION DOSAGE GIVEN: 2.67

## 2021-08-18 PROCEDURE — 77412 RADIATION TX DELIVERY LVL 3: CPT | Performed by: RADIOLOGY

## 2021-08-18 PROCEDURE — 77280 THER RAD SIMULAJ FIELD SMPL: CPT | Performed by: RADIOLOGY

## 2021-08-18 PROCEDURE — 77280 THER RAD SIMULAJ FIELD SMPL: CPT | Mod: 26 | Performed by: RADIOLOGY

## 2021-08-18 RX ORDER — ALUMINUM HYDROXIDE, MAGNESIUM HYDROXIDE, SIMETHICONE 800; 800; 80 MG/10ML; MG/10ML; MG/10ML
SUSPENSION ORAL
Status: ON HOLD | COMMUNITY
Start: 2021-07-16 | End: 2022-01-01

## 2021-08-18 NOTE — ON TREATMENT VISIT
ON TREATMENT NOTE  RADIATION ONCOLOGY DEPARTMENT    Patient name:  Ashleigh Marquis    Primary Physician:  PAM Oropeza MRN: [id number]  CSN: [id number]   Referring physician:  Hi Valle M.D. : 1960, 60 y.o.     ENCOUNTER DATE:  21    DIAGNOSIS:    Malignant neoplasm of central portion of breast in female, estrogen receptor negative (HCC)  Staging form: Breast, AJCC 8th Edition  - Pathologic: Stage IIA (pT2, pN0(sn), cM0, G3, ER-, CA-, HER2-) - Signed by Ashleigh Russell M.D. on 2021  Stage prefix: Initial diagnosis  Method of lymph node assessment: [place] lymph node biopsy  Histologic grading system: 3 grade system      TREATMENT SUMMARY:  Radiation Treatments     Active   Plans   Rt Breast   Most recent treatment: Dose planned: 267 cGy (fraction 1 of 15 on 2021)   Total: Dose planned: 4,005 cGy   Elapsed Days: 0 @ [id number]      Reference Points   Rt Breast   Most recent treatment: Dose given: 267 cGy (on 2021)   Total: Dose given: 267 cGy   Elapsed Days: 0 @ [id number]      Rt Breast CP   Most recent treatment: Dose given: 267 cGy (on 2021)   Total: Dose given: 267 cGy   Elapsed Days: 0 @ [id number]                    SUBJECTIVE:   Tolerated first treatment without event.      VITAL SIGNS:  There were no vitals taken for this visit.      Pain Assessment 2021   Pain Score 8   Pain Loc (No Data)   Some recent data might be hidden          PHYSICAL EXAM:    No erythema    No flowsheet data found.      IMPRESSION:  Cancer Staging  Malignant neoplasm of central portion of breast in female, estrogen receptor negative (HCC)  Staging form: Breast, AJCC 8th Edition  - Pathologic: Stage IIA (pT2, pN0(sn), cM0, G3, ER-, CA-, HER2-) - Signed by Ashleigh Russell M.D. on 2021      PLAN:  No change in treatment plan .  I emphasized the importance of coming in every single day.  She missed the first start date yesterday.    Disposition:  Treatment  plan reviewed. Questions answered. Continue therapy outlined.     Ashleigh Russell M.D.    Orders Placed This Encounter   • Rad Onc Aria Session Summary   • ANTACID MAXIMUM STRENGTH 400-400-40 MG/5ML Suspension

## 2021-08-19 ENCOUNTER — TELEPHONE (OUTPATIENT)
Dept: RADIATION ONCOLOGY | Facility: MEDICAL CENTER | Age: 61
End: 2021-08-19

## 2021-08-19 ENCOUNTER — HOSPITAL ENCOUNTER (OUTPATIENT)
Dept: RADIATION ONCOLOGY | Facility: MEDICAL CENTER | Age: 61
End: 2021-08-19
Payer: MEDICAID

## 2021-08-19 LAB
CHEMOTHERAPY INFUSION START DATE: NORMAL
CHEMOTHERAPY RECORDS: 2.67
CHEMOTHERAPY RECORDS: 4005
CHEMOTHERAPY RECORDS: NORMAL
CHEMOTHERAPY RX CANCER: NORMAL
DATE 1ST CHEMO CANCER: NORMAL
RAD ONC ARIA COURSE LAST TREATMENT DATE: NORMAL
RAD ONC ARIA COURSE TREATMENT ELAPSED DAYS: NORMAL
RAD ONC ARIA REFERENCE POINT DOSAGE GIVEN TO DATE: 5.34
RAD ONC ARIA REFERENCE POINT DOSAGE GIVEN TO DATE: 5.34
RAD ONC ARIA REFERENCE POINT ID: NORMAL
RAD ONC ARIA REFERENCE POINT ID: NORMAL
RAD ONC ARIA REFERENCE POINT SESSION DOSAGE GIVEN: 2.67
RAD ONC ARIA REFERENCE POINT SESSION DOSAGE GIVEN: 2.67

## 2021-08-19 PROCEDURE — 77412 RADIATION TX DELIVERY LVL 3: CPT | Performed by: RADIOLOGY

## 2021-08-19 NOTE — TELEPHONE ENCOUNTER
"Nutrition Services: Telephone Encounter    RD received a transferred call from  line from pt intended for RD. Pt states is requesting Ensure as \"really needs this\". States is coming in this afternoon at 3:30pm for XRT.    RD observed schedule, appears pt had XRT today at 8am instead of 3:30pm. RD called, though pt did nto answer. RD left message stating would visit pt tomorrow following XRT and to call if has any questions or concerns prior to this meeting. RD provided contact info.     RD to follow-up     "

## 2021-08-20 ENCOUNTER — PATIENT OUTREACH (OUTPATIENT)
Dept: OTHER | Facility: MEDICAL CENTER | Age: 61
End: 2021-08-20

## 2021-08-20 ENCOUNTER — DOCUMENTATION (OUTPATIENT)
Dept: RADIATION ONCOLOGY | Facility: MEDICAL CENTER | Age: 61
End: 2021-08-20

## 2021-08-20 ENCOUNTER — HOSPITAL ENCOUNTER (OUTPATIENT)
Dept: RADIATION ONCOLOGY | Facility: MEDICAL CENTER | Age: 61
End: 2021-08-20
Payer: MEDICAID

## 2021-08-20 LAB
CHEMOTHERAPY INFUSION START DATE: NORMAL
CHEMOTHERAPY RECORDS: 2.67
CHEMOTHERAPY RECORDS: 4005
CHEMOTHERAPY RECORDS: NORMAL
CHEMOTHERAPY RX CANCER: NORMAL
DATE 1ST CHEMO CANCER: NORMAL
RAD ONC ARIA COURSE LAST TREATMENT DATE: NORMAL
RAD ONC ARIA COURSE TREATMENT ELAPSED DAYS: NORMAL
RAD ONC ARIA REFERENCE POINT DOSAGE GIVEN TO DATE: 8.01
RAD ONC ARIA REFERENCE POINT DOSAGE GIVEN TO DATE: 8.01
RAD ONC ARIA REFERENCE POINT ID: NORMAL
RAD ONC ARIA REFERENCE POINT ID: NORMAL
RAD ONC ARIA REFERENCE POINT SESSION DOSAGE GIVEN: 2.67
RAD ONC ARIA REFERENCE POINT SESSION DOSAGE GIVEN: 2.67

## 2021-08-20 PROCEDURE — 77336 RADIATION PHYSICS CONSULT: CPT | Performed by: RADIOLOGY

## 2021-08-20 PROCEDURE — 77412 RADIATION TX DELIVERY LVL 3: CPT | Performed by: RADIOLOGY

## 2021-08-20 NOTE — PROGRESS NOTES
Received notice from [facility] stating pt expressing concerns regarding pain medication.  Phoned pt for follow up, no answer, lvm.

## 2021-08-20 NOTE — PROGRESS NOTES
Nutrition Services: Brief Update  Wt Readings from Last 7 Encounters:   07/05/21 84.6 kg (186 lb 8.2 oz)   06/14/21 83.1 kg (183 lb 3.2 oz)   06/10/21 84.6 kg (186 lb 8.2 oz)   06/09/21 84.6 kg (186 lb 8.2 oz)   06/07/21 85.7 kg (188 lb 15 oz)   05/18/21 85.5 kg (188 lb 7.9 oz)   05/17/21 86.3 kg (190 lb 4.1 oz)     Weight Change: wt remaining stable    RD able to visit with pt following XRT. Pt had already left waiting room, though RD able to connect with phone call and requested pt come back to waiting room to visit with RD as able. Pt states friend is waiting for her so she does not have a lot of time to talk, though was able to visit RD outside of Radiation Oncology. Pt discusses need for pain medication. Pt discusses inability to receive pain medication, though details of this was indiscernible. Pt also expressed desire to enter [facility] for new wig cap.     RD discussed pain medication is outside of RD scope and encouraged her to connect with her oncologist. Pt repeated same information regarding pain medication, RD relayed concern to NN. RD provided small sample of Boost Max protein. RD available to assist with connection to [facility], though is unable to provide supply to patient. Future need for supplements will need to be provided through [facility] or other resource.     RD remains available PRN  [phone]

## 2021-08-20 NOTE — PROGRESS NOTES
Returned pt's call.  Pt states that she was established with [facility] but recently left as the medications prescribed were not covered by Medicaid.  She was then referred to [facility] which the pt states does not take Medicaid.  She states she went to her PCP for pain medication but was told that as she is taking benzodiazepines she could not prescribe a narcotic.  Pt then sought care at [facility] ED where she states she got a prescription for pain medication. She is now seeking assistance in finding a provider to help her manage her pain.  Pt does not wish to return to her PCP.  This ONN provided contact information for [facility] and [facility] in order for pt to establish with a new PCP.  Explained that she will need to be referred to a pain specialist for pain management.

## 2021-08-23 ENCOUNTER — HOSPITAL ENCOUNTER (OUTPATIENT)
Dept: RADIATION ONCOLOGY | Facility: MEDICAL CENTER | Age: 61
End: 2021-08-23
Payer: MEDICAID

## 2021-08-23 LAB
CHEMOTHERAPY INFUSION START DATE: NORMAL
CHEMOTHERAPY RECORDS: 2.67
CHEMOTHERAPY RECORDS: 4005
CHEMOTHERAPY RECORDS: NORMAL
CHEMOTHERAPY RX CANCER: NORMAL
DATE 1ST CHEMO CANCER: NORMAL
RAD ONC ARIA COURSE LAST TREATMENT DATE: NORMAL
RAD ONC ARIA COURSE TREATMENT ELAPSED DAYS: NORMAL
RAD ONC ARIA REFERENCE POINT DOSAGE GIVEN TO DATE: 10.68
RAD ONC ARIA REFERENCE POINT DOSAGE GIVEN TO DATE: 10.68
RAD ONC ARIA REFERENCE POINT ID: NORMAL
RAD ONC ARIA REFERENCE POINT ID: NORMAL
RAD ONC ARIA REFERENCE POINT SESSION DOSAGE GIVEN: 2.67
RAD ONC ARIA REFERENCE POINT SESSION DOSAGE GIVEN: 2.67

## 2021-08-23 PROCEDURE — 77412 RADIATION TX DELIVERY LVL 3: CPT | Performed by: RADIOLOGY
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
## 2021-08-24 ENCOUNTER — HOSPITAL ENCOUNTER (OUTPATIENT)
Dept: RADIATION ONCOLOGY | Facility: MEDICAL CENTER | Age: 61
End: 2021-08-24
Payer: MEDICAID

## 2021-08-24 LAB
CHEMOTHERAPY INFUSION START DATE: NORMAL
CHEMOTHERAPY RECORDS: 2.67
CHEMOTHERAPY RECORDS: 4005
CHEMOTHERAPY RECORDS: NORMAL
CHEMOTHERAPY RX CANCER: NORMAL
DATE 1ST CHEMO CANCER: NORMAL
RAD ONC ARIA COURSE LAST TREATMENT DATE: NORMAL
RAD ONC ARIA COURSE TREATMENT ELAPSED DAYS: NORMAL
RAD ONC ARIA REFERENCE POINT DOSAGE GIVEN TO DATE: 13.35
RAD ONC ARIA REFERENCE POINT DOSAGE GIVEN TO DATE: 13.35
RAD ONC ARIA REFERENCE POINT ID: NORMAL
RAD ONC ARIA REFERENCE POINT ID: NORMAL
RAD ONC ARIA REFERENCE POINT SESSION DOSAGE GIVEN: 2.67
RAD ONC ARIA REFERENCE POINT SESSION DOSAGE GIVEN: 2.67

## 2021-08-24 PROCEDURE — 77412 RADIATION TX DELIVERY LVL 3: CPT | Performed by: RADIOLOGY

## 2021-08-24 PROCEDURE — 77427 RADIATION TX MANAGEMENT X5: CPT | Performed by: RADIOLOGY

## 2021-08-25 ENCOUNTER — HOSPITAL ENCOUNTER (OUTPATIENT)
Dept: RADIATION ONCOLOGY | Facility: MEDICAL CENTER | Age: 61
End: 2021-08-25
Payer: MEDICAID

## 2021-08-25 VITALS
TEMPERATURE: 98.2 F | SYSTOLIC BLOOD PRESSURE: 108 MMHG | DIASTOLIC BLOOD PRESSURE: 70 MMHG | OXYGEN SATURATION: 94 % | HEART RATE: 76 BPM

## 2021-08-25 LAB
CHEMOTHERAPY INFUSION START DATE: NORMAL
CHEMOTHERAPY RECORDS: 2.67
CHEMOTHERAPY RECORDS: 4005
CHEMOTHERAPY RECORDS: NORMAL
CHEMOTHERAPY RX CANCER: NORMAL
DATE 1ST CHEMO CANCER: NORMAL
RAD ONC ARIA COURSE LAST TREATMENT DATE: NORMAL
RAD ONC ARIA COURSE TREATMENT ELAPSED DAYS: NORMAL
RAD ONC ARIA REFERENCE POINT DOSAGE GIVEN TO DATE: 16.02
RAD ONC ARIA REFERENCE POINT DOSAGE GIVEN TO DATE: 16.02
RAD ONC ARIA REFERENCE POINT ID: NORMAL
RAD ONC ARIA REFERENCE POINT ID: NORMAL
RAD ONC ARIA REFERENCE POINT SESSION DOSAGE GIVEN: 2.67
RAD ONC ARIA REFERENCE POINT SESSION DOSAGE GIVEN: 2.67

## 2021-08-25 PROCEDURE — 77417 THER RADIOLOGY PORT IMAGE(S): CPT | Performed by: RADIOLOGY

## 2021-08-25 PROCEDURE — 77412 RADIATION TX DELIVERY LVL 3: CPT | Performed by: RADIOLOGY

## 2021-08-25 NOTE — ON TREATMENT VISIT
ON TREATMENT NOTE  RADIATION ONCOLOGY DEPARTMENT    Patient name:  Ashleigh Marquis    Primary Physician:  PAM Oropeza MRN: [id number]  CSN: [id number]   Referring physician:  Hi Valle M.D. : 1960, 60 y.o.     ENCOUNTER DATE:  21    DIAGNOSIS:    Malignant neoplasm of central portion of breast in female, estrogen receptor negative (HCC)  Staging form: Breast, AJCC 8th Edition  - Pathologic: Stage IIA (pT2, pN0(sn), cM0, G3, ER-, [place]-, HER2-) - Signed by Ashleigh Russell M.D. on 2021  Stage prefix: Initial diagnosis  Method of lymph node assessment: [place] lymph node biopsy  Histologic grading system: 3 grade system      TREATMENT SUMMARY:  Radiation Treatments     Active   Plans   Rt Breast   Most recent treatment: Dose planned: 267 cGy (fraction 6 of 15 on 2021)   Total: Dose planned: 4,005 cGy   Elapsed Days: 7 @       Reference Points   Rt Breast   Most recent treatment: Dose given: 267 cGy (on 2021)   Total: Dose given: 1,602 cGy   Elapsed Days: 7 @       Rt Breast CP   Most recent treatment: Dose given: 267 cGy (on 2021)   Total: Dose given: 1,602 cGy   Elapsed Days: 7 @                     SUBJECTIVE:   Tolerating therapy without event      VITAL SIGNS:  /70 (BP Location: Left arm, Patient Position: Sitting, BP Cuff Size: Adult)   Pulse 76   Temp 36.8 °C (98.2 °F) (Temporal)   SpO2 94%    Encounter Vitals  Temperature: 36.8 °C (98.2 °F)  Temp src: Temporal  Blood Pressure: 108/70  Pulse: 76  Pulse Oximetry: 94 %  Pain Assessment 2021   Pain Score 8   Pain Loc (No Data)   Some recent data might be hidden          PHYSICAL EXAM:    No erythema    Toxicity Assessment 2021   Toxicity Assessment Breast   Fatigue (lethargy, malaise, asthenia) None   Fever (in the absence of neutropenia) None   Radiation Dermatitis None   Lymphatics Normal   RT - Pain due to RT None   Dyspnea Normal          IMPRESSION:  Cancer Staging  Malignant neoplasm of central portion of breast in female, estrogen receptor negative (HCC)  Staging form: Breast, AJCC 8th Edition  - Pathologic: Stage IIA (pT2, pN0(sn), cM0, G3, ER-, [place]-, HER2-) - Signed by Ashleigh Russell M.D. on 4/28/2021      PLAN:  No change in treatment plan    Disposition:  Treatment plan reviewed. Questions answered. Continue therapy outlined.     Ashleigh Russell M.D.    No orders of the defined types were placed in this encounter.

## 2021-08-26 ENCOUNTER — HOSPITAL ENCOUNTER (OUTPATIENT)
Dept: RADIATION ONCOLOGY | Facility: MEDICAL CENTER | Age: 61
End: 2021-08-26
Payer: MEDICAID

## 2021-08-26 LAB
CHEMOTHERAPY INFUSION START DATE: NORMAL
CHEMOTHERAPY RECORDS: 2.67
CHEMOTHERAPY RECORDS: 4005
CHEMOTHERAPY RECORDS: NORMAL
CHEMOTHERAPY RX CANCER: NORMAL
DATE 1ST CHEMO CANCER: NORMAL
RAD ONC ARIA COURSE LAST TREATMENT DATE: NORMAL
RAD ONC ARIA COURSE TREATMENT ELAPSED DAYS: NORMAL
RAD ONC ARIA REFERENCE POINT DOSAGE GIVEN TO DATE: 18.69
RAD ONC ARIA REFERENCE POINT DOSAGE GIVEN TO DATE: 18.69
RAD ONC ARIA REFERENCE POINT ID: NORMAL
RAD ONC ARIA REFERENCE POINT ID: NORMAL
RAD ONC ARIA REFERENCE POINT SESSION DOSAGE GIVEN: 2.67
RAD ONC ARIA REFERENCE POINT SESSION DOSAGE GIVEN: 2.67

## 2021-08-26 PROCEDURE — 77412 RADIATION TX DELIVERY LVL 3: CPT | Performed by: RADIOLOGY

## 2021-08-27 ENCOUNTER — HOSPITAL ENCOUNTER (OUTPATIENT)
Dept: RADIATION ONCOLOGY | Facility: MEDICAL CENTER | Age: 61
End: 2021-08-27
Payer: MEDICAID

## 2021-08-27 LAB
CHEMOTHERAPY INFUSION START DATE: NORMAL
CHEMOTHERAPY RECORDS: 2.67
CHEMOTHERAPY RECORDS: 4005
CHEMOTHERAPY RECORDS: NORMAL
CHEMOTHERAPY RX CANCER: NORMAL
DATE 1ST CHEMO CANCER: NORMAL
RAD ONC ARIA COURSE LAST TREATMENT DATE: NORMAL
RAD ONC ARIA COURSE TREATMENT ELAPSED DAYS: NORMAL
RAD ONC ARIA REFERENCE POINT DOSAGE GIVEN TO DATE: 21.36
RAD ONC ARIA REFERENCE POINT DOSAGE GIVEN TO DATE: 21.36
RAD ONC ARIA REFERENCE POINT ID: NORMAL
RAD ONC ARIA REFERENCE POINT ID: NORMAL
RAD ONC ARIA REFERENCE POINT SESSION DOSAGE GIVEN: 2.67
RAD ONC ARIA REFERENCE POINT SESSION DOSAGE GIVEN: 2.67

## 2021-08-27 PROCEDURE — 77412 RADIATION TX DELIVERY LVL 3: CPT | Performed by: RADIOLOGY

## 2021-08-27 PROCEDURE — 77336 RADIATION PHYSICS CONSULT: CPT | Performed by: RADIOLOGY

## 2021-08-30 ENCOUNTER — HOSPITAL ENCOUNTER (OUTPATIENT)
Dept: RADIATION ONCOLOGY | Facility: MEDICAL CENTER | Age: 61
End: 2021-08-30
Payer: MEDICAID

## 2021-08-30 LAB
CHEMOTHERAPY INFUSION START DATE: NORMAL
CHEMOTHERAPY RECORDS: 2.67
CHEMOTHERAPY RECORDS: 4005
CHEMOTHERAPY RECORDS: NORMAL
CHEMOTHERAPY RX CANCER: NORMAL
DATE 1ST CHEMO CANCER: NORMAL
RAD ONC ARIA COURSE LAST TREATMENT DATE: NORMAL
RAD ONC ARIA COURSE TREATMENT ELAPSED DAYS: NORMAL
RAD ONC ARIA REFERENCE POINT DOSAGE GIVEN TO DATE: 24.03
RAD ONC ARIA REFERENCE POINT DOSAGE GIVEN TO DATE: 24.03
RAD ONC ARIA REFERENCE POINT ID: NORMAL
RAD ONC ARIA REFERENCE POINT ID: NORMAL
RAD ONC ARIA REFERENCE POINT SESSION DOSAGE GIVEN: 2.67
RAD ONC ARIA REFERENCE POINT SESSION DOSAGE GIVEN: 2.67

## 2021-08-30 PROCEDURE — 77412 RADIATION TX DELIVERY LVL 3: CPT | Performed by: RADIOLOGY

## 2021-08-31 ENCOUNTER — HOSPITAL ENCOUNTER (OUTPATIENT)
Dept: RADIATION ONCOLOGY | Facility: MEDICAL CENTER | Age: 61
End: 2021-08-31
Payer: MEDICAID

## 2021-08-31 LAB
CHEMOTHERAPY INFUSION START DATE: NORMAL
CHEMOTHERAPY RECORDS: 2.67
CHEMOTHERAPY RECORDS: 4005
CHEMOTHERAPY RECORDS: NORMAL
CHEMOTHERAPY RX CANCER: NORMAL
DATE 1ST CHEMO CANCER: NORMAL
RAD ONC ARIA COURSE LAST TREATMENT DATE: NORMAL
RAD ONC ARIA COURSE TREATMENT ELAPSED DAYS: NORMAL
RAD ONC ARIA REFERENCE POINT DOSAGE GIVEN TO DATE: 26.7
RAD ONC ARIA REFERENCE POINT DOSAGE GIVEN TO DATE: 26.7
RAD ONC ARIA REFERENCE POINT ID: NORMAL
RAD ONC ARIA REFERENCE POINT ID: NORMAL
RAD ONC ARIA REFERENCE POINT SESSION DOSAGE GIVEN: 2.67
RAD ONC ARIA REFERENCE POINT SESSION DOSAGE GIVEN: 2.67

## 2021-08-31 PROCEDURE — 77427 RADIATION TX MANAGEMENT X5: CPT | Performed by: RADIOLOGY

## 2021-08-31 PROCEDURE — 77412 RADIATION TX DELIVERY LVL 3: CPT | Performed by: RADIOLOGY

## 2021-09-01 ENCOUNTER — HOSPITAL ENCOUNTER (OUTPATIENT)
Dept: RADIATION ONCOLOGY | Facility: MEDICAL CENTER | Age: 61
End: 2021-09-30
Attending: RADIOLOGY
Payer: MEDICAID

## 2021-09-01 ENCOUNTER — HOSPITAL ENCOUNTER (OUTPATIENT)
Dept: RADIATION ONCOLOGY | Facility: MEDICAL CENTER | Age: 61
End: 2021-09-01
Payer: MEDICAID

## 2021-09-01 VITALS
OXYGEN SATURATION: 98 % | TEMPERATURE: 96.8 F | HEART RATE: 92 BPM | SYSTOLIC BLOOD PRESSURE: 119 MMHG | DIASTOLIC BLOOD PRESSURE: 82 MMHG

## 2021-09-01 LAB
CHEMOTHERAPY INFUSION START DATE: NORMAL
CHEMOTHERAPY RECORDS: 2.67
CHEMOTHERAPY RECORDS: 4005
CHEMOTHERAPY RECORDS: NORMAL
CHEMOTHERAPY RX CANCER: NORMAL
DATE 1ST CHEMO CANCER: NORMAL
RAD ONC ARIA COURSE LAST TREATMENT DATE: NORMAL
RAD ONC ARIA COURSE TREATMENT ELAPSED DAYS: NORMAL
RAD ONC ARIA REFERENCE POINT DOSAGE GIVEN TO DATE: 29.37
RAD ONC ARIA REFERENCE POINT DOSAGE GIVEN TO DATE: 29.37
RAD ONC ARIA REFERENCE POINT ID: NORMAL
RAD ONC ARIA REFERENCE POINT ID: NORMAL
RAD ONC ARIA REFERENCE POINT SESSION DOSAGE GIVEN: 2.67
RAD ONC ARIA REFERENCE POINT SESSION DOSAGE GIVEN: 2.67

## 2021-09-01 PROCEDURE — 77412 RADIATION TX DELIVERY LVL 3: CPT | Performed by: RADIOLOGY

## 2021-09-01 PROCEDURE — 77417 THER RADIOLOGY PORT IMAGE(S): CPT | Performed by: RADIOLOGY

## 2021-09-01 ASSESSMENT — PAIN SCALES - GENERAL: PAINLEVEL: 7=MODERATE-SEVERE PAIN

## 2021-09-01 NOTE — ON TREATMENT VISIT
ON TREATMENT NOTE  RADIATION ONCOLOGY DEPARTMENT    Patient name:  Ashleigh Marquis    Primary Physician:  PAM Oropeza MRN: [id number]  CSN: [id number]   Referring physician:  Hi Valle M.D. : 1960, 60 y.o.     ENCOUNTER DATE:  21    DIAGNOSIS:    Malignant neoplasm of central portion of breast in female, estrogen receptor negative (HCC)  Staging form: Breast, AJCC 8th Edition  - Pathologic: Stage IIA (pT2, pN0(sn), cM0, G3, ER-, [place]-, HER2-) - Signed by Ashleigh Russell M.D. on 2021  Stage prefix: Initial diagnosis  Method of lymph node assessment: [place] lymph node biopsy  Histologic grading system: 3 grade system      TREATMENT SUMMARY:  Radiation Treatments     Active   Plans   Rt Breast   Most recent treatment: Dose planned: 267 cGy (fraction 11 of 15 on 2021)   Total: Dose planned: 4,005 cGy   Elapsed Days: 14 @       Reference Points   Rt Breast   Most recent treatment: Dose given: 267 cGy (on 2021)   Total: Dose given: 2,937 cGy   Elapsed Days: 14 @       Rt Breast CP   Most recent treatment: Dose given: 267 cGy (on 2021)   Total: Dose given: 2,937 cGy   Elapsed Days: 14 @                     SUBJECTIVE:   Asking for pain meds      VITAL SIGNS:  /82 (BP Location: Left arm, Patient Position: Sitting, BP Cuff Size: Adult)   Pulse 92   Temp 36 °C (96.8 °F) (Temporal)   SpO2 98%    Encounter Vitals  Temperature: 36 °C (96.8 °F)  Temp src: Temporal  Blood Pressure: 119/82  Pulse: 92  Pulse Oximetry: 98 %  Pain Score: 7=Moderate-Severe Pain  Pain Assessment 2021   Pain Score 7 8   Pain Loc Breast (No Data)   Some recent data might be hidden          PHYSICAL EXAM:    Mild erythema in the treatment field    Toxicity Assessment 2021   Toxicity Assessment Breast Breast   Fatigue (lethargy, malaise, asthenia) Increased fatigue over baseline, but not altering normal activities None   Fever  (in the absence of neutropenia) None None   Radiation Dermatitis None None   Lymphatics Normal Normal   RT - Pain due to RT None None   Dyspnea Normal Normal         IMPRESSION:  Cancer Staging  Malignant neoplasm of central portion of breast in female, estrogen receptor negative (HCC)  Staging form: Breast, AJCC 8th Edition  - Pathologic: Stage IIA (pT2, pN0(sn), cM0, G3, ER-, [place]-, HER2-) - Signed by Ashleigh Russell M.D. on 4/28/2021      PLAN:  No change in treatment plan    Disposition:  Treatment plan reviewed. Questions answered. Continue therapy outlined.     Ashleigh Russell M.D.    No orders of the defined types were placed in this encounter.

## 2021-09-03 ENCOUNTER — HOSPITAL ENCOUNTER (OUTPATIENT)
Dept: RADIATION ONCOLOGY | Facility: MEDICAL CENTER | Age: 61
End: 2021-09-03
Payer: MEDICAID

## 2021-09-03 LAB
CHEMOTHERAPY INFUSION START DATE: NORMAL
CHEMOTHERAPY RECORDS: 2.67
CHEMOTHERAPY RECORDS: 4005
CHEMOTHERAPY RECORDS: NORMAL
CHEMOTHERAPY RX CANCER: NORMAL
DATE 1ST CHEMO CANCER: NORMAL
RAD ONC ARIA COURSE LAST TREATMENT DATE: NORMAL
RAD ONC ARIA COURSE TREATMENT ELAPSED DAYS: NORMAL
RAD ONC ARIA REFERENCE POINT DOSAGE GIVEN TO DATE: 32.04
RAD ONC ARIA REFERENCE POINT DOSAGE GIVEN TO DATE: 32.04
RAD ONC ARIA REFERENCE POINT ID: NORMAL
RAD ONC ARIA REFERENCE POINT ID: NORMAL
RAD ONC ARIA REFERENCE POINT SESSION DOSAGE GIVEN: 2.67
RAD ONC ARIA REFERENCE POINT SESSION DOSAGE GIVEN: 2.67

## 2021-09-03 PROCEDURE — 77412 RADIATION TX DELIVERY LVL 3: CPT | Performed by: RADIOLOGY

## 2021-09-07 ENCOUNTER — HOSPITAL ENCOUNTER (OUTPATIENT)
Dept: RADIATION ONCOLOGY | Facility: MEDICAL CENTER | Age: 61
End: 2021-09-07
Payer: MEDICAID

## 2021-09-07 LAB
CHEMOTHERAPY INFUSION START DATE: NORMAL
CHEMOTHERAPY RECORDS: 2.67
CHEMOTHERAPY RECORDS: 4005
CHEMOTHERAPY RECORDS: NORMAL
CHEMOTHERAPY RX CANCER: NORMAL
DATE 1ST CHEMO CANCER: NORMAL
RAD ONC ARIA COURSE LAST TREATMENT DATE: NORMAL
RAD ONC ARIA COURSE TREATMENT ELAPSED DAYS: NORMAL
RAD ONC ARIA REFERENCE POINT DOSAGE GIVEN TO DATE: 34.71
RAD ONC ARIA REFERENCE POINT DOSAGE GIVEN TO DATE: 34.71
RAD ONC ARIA REFERENCE POINT ID: NORMAL
RAD ONC ARIA REFERENCE POINT ID: NORMAL
RAD ONC ARIA REFERENCE POINT SESSION DOSAGE GIVEN: 2.67
RAD ONC ARIA REFERENCE POINT SESSION DOSAGE GIVEN: 2.67

## 2021-09-07 PROCEDURE — 77307 TELETHX ISODOSE PLAN CPLX: CPT | Performed by: RADIOLOGY

## 2021-09-07 PROCEDURE — 77334 RADIATION TREATMENT AID(S): CPT | Mod: 26 | Performed by: RADIOLOGY

## 2021-09-07 PROCEDURE — 77336 RADIATION PHYSICS CONSULT: CPT | Performed by: RADIOLOGY

## 2021-09-07 PROCEDURE — 77417 THER RADIOLOGY PORT IMAGE(S): CPT | Performed by: RADIOLOGY

## 2021-09-07 PROCEDURE — 77412 RADIATION TX DELIVERY LVL 3: CPT | Performed by: RADIOLOGY

## 2021-09-07 PROCEDURE — 77307 TELETHX ISODOSE PLAN CPLX: CPT | Mod: 26 | Performed by: RADIOLOGY

## 2021-09-07 PROCEDURE — 77334 RADIATION TREATMENT AID(S): CPT | Performed by: RADIOLOGY

## 2021-09-08 ENCOUNTER — DOCUMENTATION (OUTPATIENT)
Dept: RADIATION ONCOLOGY | Facility: MEDICAL CENTER | Age: 61
End: 2021-09-08

## 2021-09-08 ENCOUNTER — HOSPITAL ENCOUNTER (OUTPATIENT)
Dept: RADIATION ONCOLOGY | Facility: MEDICAL CENTER | Age: 61
End: 2021-09-08
Payer: MEDICAID

## 2021-09-08 VITALS
HEART RATE: 91 BPM | DIASTOLIC BLOOD PRESSURE: 74 MMHG | TEMPERATURE: 96.5 F | SYSTOLIC BLOOD PRESSURE: 113 MMHG | OXYGEN SATURATION: 97 %

## 2021-09-08 LAB
CHEMOTHERAPY INFUSION START DATE: NORMAL
CHEMOTHERAPY RECORDS: 2.67
CHEMOTHERAPY RECORDS: 4005
CHEMOTHERAPY RECORDS: NORMAL
CHEMOTHERAPY RX CANCER: NORMAL
DATE 1ST CHEMO CANCER: NORMAL
RAD ONC ARIA COURSE LAST TREATMENT DATE: NORMAL
RAD ONC ARIA COURSE TREATMENT ELAPSED DAYS: NORMAL
RAD ONC ARIA REFERENCE POINT DOSAGE GIVEN TO DATE: 37.38
RAD ONC ARIA REFERENCE POINT DOSAGE GIVEN TO DATE: 37.38
RAD ONC ARIA REFERENCE POINT ID: NORMAL
RAD ONC ARIA REFERENCE POINT ID: NORMAL
RAD ONC ARIA REFERENCE POINT SESSION DOSAGE GIVEN: 2.67
RAD ONC ARIA REFERENCE POINT SESSION DOSAGE GIVEN: 2.67

## 2021-09-08 PROCEDURE — 77417 THER RADIOLOGY PORT IMAGE(S): CPT | Performed by: RADIOLOGY

## 2021-09-08 PROCEDURE — 77412 RADIATION TX DELIVERY LVL 3: CPT | Performed by: RADIOLOGY

## 2021-09-08 ASSESSMENT — PAIN SCALES - GENERAL: PAINLEVEL: NO PAIN

## 2021-09-08 NOTE — PROGRESS NOTES
"Nutrition Services: Brief Update  Wt Readings from Last 7 Encounters:   07/05/21 84.6 kg (186 lb 8.2 oz)   06/14/21 83.1 kg (183 lb 3.2 oz)   06/10/21 84.6 kg (186 lb 8.2 oz)   06/09/21 84.6 kg (186 lb 8.2 oz)   06/07/21 85.7 kg (188 lb 15 oz)   05/18/21 85.5 kg (188 lb 7.9 oz)   05/17/21 86.3 kg (190 lb 4.1 oz)     Weight Change: wt remaining stable x 3 months    Pt requested to see RD as stated \"needs more Boost\". RD able to briefly visit with pt. Pt states eating is challenging at times and likes to have Boost supplements around just in case.  is still working with her KOLTON Huynh at [facility] and is scheduled to see her on Friday. Pt states does not know what [facility] is, and SW has not mentioned this either. Pt mentions was \"scheduled to get supplements today\" as was written on her \"papers\".    Plan/Recommend:  • Provided 3 bottles of Ensure Enlive. RD discussed unable to continue providing supplements beyond this point, as Radiation Oncology department is not equipped to provide supplies and products are meant as samples only, therefore all patients are able to have access to trial. Given weight stability, supplementation also not necessarily indicated and is not intended as a replacement for food when possible. Discussed RD is available to help pt get established with [facility], which can provide supplements long-term.  Pt states is not sure what carechest is despite RD and pt having conversations about this in the past. RD wrote contact information and requested that pt have KOLTON Huynh contact RD to help get this established with pt. Pt verbalized understanding and agreed would have SW contact RD. RD expressed difficulty getting a hold of this SW otherwise despite multiple attempts.      Pt verbalizes understanding and is receptive to information provided. RD available PRN.   Please contact [phone]    "

## 2021-09-08 NOTE — ON TREATMENT VISIT
ON TREATMENT NOTE  RADIATION ONCOLOGY DEPARTMENT    Patient name:  Ashleigh Marquis    Primary Physician:  PAM Oropeza MRN: [id number]  CSN: [id number]   Referring physician:  Hi Valle M.D. : 1960, 60 y.o.     ENCOUNTER DATE:  21    DIAGNOSIS:    Malignant neoplasm of central portion of breast in female, estrogen receptor negative (HCC)  Staging form: Breast, AJCC 8th Edition  - Pathologic: Stage IIA (pT2, pN0(sn), cM0, G3, ER-, [place]-, HER2-) - Signed by Ashleigh Russell M.D. on 2021  Stage prefix: Initial diagnosis  Method of lymph node assessment: [place] lymph node biopsy  Histologic grading system: 3 grade system      TREATMENT SUMMARY:  Radiation Treatments     Active   Plans   Rt Breast   Most recent treatment: Dose planned: 267 cGy (fraction 14 of 15 on 2021)   Total: Dose planned: 4,005 cGy   Elapsed Days:  @       Reference Points   Rt Breast   Most recent treatment: Dose given: 267 cGy (on 2021)   Total: Dose given: 3,738 cGy   Elapsed Days: 21 @ [id number]      Rt Breast CP   Most recent treatment: Dose given: 267 cGy (on 2021)   Total: Dose given: 3,738 cGy   Elapsed Days:  @ [id number]                    SUBJECTIVE:   Tolerating radiation without event.  Has abdominal issues otherwise.      VITAL SIGNS:  /74 (BP Location: Left arm, Patient Position: Sitting, BP Cuff Size: Adult)   Pulse 91   Temp 35.8 °C (96.5 °F) (Temporal)   SpO2 97%    Encounter Vitals  Temperature: 35.8 °C (96.5 °F)  Temp src: Temporal  Blood Pressure: 113/74  Pulse: 91  Pulse Oximetry: 97 %  Pain Score: No pain  Pain Assessment 2021   Pain Score 0 7 8   Pain Loc - Breast (No Data)   Some recent data might be hidden          PHYSICAL EXAM:    Mild erythema in the treatment field    Toxicity Assessment 2021   Toxicity Assessment Breast Breast Breast   Fatigue (lethargy, malaise, asthenia) Increased fatigue  over baseline, but not altering normal activities Increased fatigue over baseline, but not altering normal activities None   Fever (in the absence of neutropenia) None None None   Radiation Dermatitis None None None   Lymphatics Normal Normal Normal   RT - Pain due to RT None None None   Dyspnea Normal Normal Normal         IMPRESSION:  Cancer Staging  Malignant neoplasm of central portion of breast in female, estrogen receptor negative (HCC)  Staging form: Breast, AJCC 8th Edition  - Pathologic: Stage IIA (pT2, pN0(sn), cM0, G3, ER-, [place]-, HER2-) - Signed by Ashleigh Russell M.D. on 4/28/2021      PLAN:  No change in treatment plan    Disposition:  Treatment plan reviewed. Questions answered. Continue therapy outlined.     Ashleigh Russell M.D.    No orders of the defined types were placed in this encounter.

## 2021-09-09 LAB
CHEMOTHERAPY INFUSION START DATE: NORMAL
CHEMOTHERAPY RECORDS: 2.67
CHEMOTHERAPY RECORDS: 4005
CHEMOTHERAPY RECORDS: NORMAL
CHEMOTHERAPY RX CANCER: NORMAL
DATE 1ST CHEMO CANCER: NORMAL
RAD ONC ARIA COURSE LAST TREATMENT DATE: NORMAL
RAD ONC ARIA COURSE TREATMENT ELAPSED DAYS: NORMAL
RAD ONC ARIA REFERENCE POINT DOSAGE GIVEN TO DATE: 40.05
RAD ONC ARIA REFERENCE POINT DOSAGE GIVEN TO DATE: 40.05
RAD ONC ARIA REFERENCE POINT ID: NORMAL
RAD ONC ARIA REFERENCE POINT ID: NORMAL
RAD ONC ARIA REFERENCE POINT SESSION DOSAGE GIVEN: 2.67
RAD ONC ARIA REFERENCE POINT SESSION DOSAGE GIVEN: 2.67

## 2021-09-09 PROCEDURE — 77427 RADIATION TX MANAGEMENT X5: CPT | Performed by: RADIOLOGY

## 2021-09-09 PROCEDURE — 77412 RADIATION TX DELIVERY LVL 3: CPT | Performed by: RADIOLOGY

## 2021-09-10 LAB
CHEMOTHERAPY INFUSION START DATE: NORMAL
CHEMOTHERAPY RECORDS: 1000
CHEMOTHERAPY RECORDS: 2
CHEMOTHERAPY RECORDS: NORMAL
CHEMOTHERAPY RX CANCER: NORMAL
DATE 1ST CHEMO CANCER: NORMAL
RAD ONC ARIA COURSE LAST TREATMENT DATE: NORMAL
RAD ONC ARIA COURSE TREATMENT ELAPSED DAYS: NORMAL
RAD ONC ARIA REFERENCE POINT DOSAGE GIVEN TO DATE: 2
RAD ONC ARIA REFERENCE POINT DOSAGE GIVEN TO DATE: 2.11
RAD ONC ARIA REFERENCE POINT ID: NORMAL
RAD ONC ARIA REFERENCE POINT ID: NORMAL
RAD ONC ARIA REFERENCE POINT SESSION DOSAGE GIVEN: 2
RAD ONC ARIA REFERENCE POINT SESSION DOSAGE GIVEN: 2.11

## 2021-09-10 PROCEDURE — 77280 THER RAD SIMULAJ FIELD SMPL: CPT | Performed by: RADIOLOGY

## 2021-09-10 PROCEDURE — 77412 RADIATION TX DELIVERY LVL 3: CPT | Performed by: RADIOLOGY

## 2021-09-10 PROCEDURE — 77280 THER RAD SIMULAJ FIELD SMPL: CPT | Mod: 26 | Performed by: RADIOLOGY

## 2021-09-14 ENCOUNTER — HOSPITAL ENCOUNTER (OUTPATIENT)
Dept: RADIATION ONCOLOGY | Facility: MEDICAL CENTER | Age: 61
End: 2021-09-14
Payer: MEDICAID

## 2021-09-14 LAB
CHEMOTHERAPY INFUSION START DATE: NORMAL
CHEMOTHERAPY RECORDS: 1000
CHEMOTHERAPY RECORDS: 2
CHEMOTHERAPY RECORDS: NORMAL
CHEMOTHERAPY RX CANCER: NORMAL
DATE 1ST CHEMO CANCER: NORMAL
RAD ONC ARIA COURSE LAST TREATMENT DATE: NORMAL
RAD ONC ARIA COURSE TREATMENT ELAPSED DAYS: NORMAL
RAD ONC ARIA REFERENCE POINT DOSAGE GIVEN TO DATE: 4
RAD ONC ARIA REFERENCE POINT DOSAGE GIVEN TO DATE: 4.23
RAD ONC ARIA REFERENCE POINT ID: NORMAL
RAD ONC ARIA REFERENCE POINT ID: NORMAL
RAD ONC ARIA REFERENCE POINT SESSION DOSAGE GIVEN: 2
RAD ONC ARIA REFERENCE POINT SESSION DOSAGE GIVEN: 2.11

## 2021-09-14 PROCEDURE — 77387 GUIDANCE FOR RADJ TX DLVR: CPT | Performed by: RADIOLOGY

## 2021-09-14 PROCEDURE — 77014 PR CT GUIDANCE PLACEMENT RAD THERAPY FIELDS: CPT | Mod: 26 | Performed by: RADIOLOGY

## 2021-09-14 PROCEDURE — 77412 RADIATION TX DELIVERY LVL 3: CPT | Performed by: RADIOLOGY

## 2021-09-15 ENCOUNTER — HOSPITAL ENCOUNTER (OUTPATIENT)
Dept: RADIATION ONCOLOGY | Facility: MEDICAL CENTER | Age: 61
End: 2021-09-15
Payer: MEDICAID

## 2021-09-15 ENCOUNTER — TELEPHONE (OUTPATIENT)
Dept: RADIATION ONCOLOGY | Facility: MEDICAL CENTER | Age: 61
End: 2021-09-15

## 2021-09-15 VITALS
SYSTOLIC BLOOD PRESSURE: 121 MMHG | TEMPERATURE: 97.6 F | DIASTOLIC BLOOD PRESSURE: 72 MMHG | HEART RATE: 79 BPM | OXYGEN SATURATION: 95 %

## 2021-09-15 LAB
CHEMOTHERAPY INFUSION START DATE: NORMAL
CHEMOTHERAPY RECORDS: 1000
CHEMOTHERAPY RECORDS: 2
CHEMOTHERAPY RECORDS: NORMAL
CHEMOTHERAPY RX CANCER: NORMAL
DATE 1ST CHEMO CANCER: NORMAL
RAD ONC ARIA COURSE LAST TREATMENT DATE: NORMAL
RAD ONC ARIA COURSE TREATMENT ELAPSED DAYS: NORMAL
RAD ONC ARIA REFERENCE POINT DOSAGE GIVEN TO DATE: 6
RAD ONC ARIA REFERENCE POINT DOSAGE GIVEN TO DATE: 6.34
RAD ONC ARIA REFERENCE POINT ID: NORMAL
RAD ONC ARIA REFERENCE POINT ID: NORMAL
RAD ONC ARIA REFERENCE POINT SESSION DOSAGE GIVEN: 2
RAD ONC ARIA REFERENCE POINT SESSION DOSAGE GIVEN: 2.11

## 2021-09-15 PROCEDURE — 77387 GUIDANCE FOR RADJ TX DLVR: CPT | Performed by: RADIOLOGY

## 2021-09-15 PROCEDURE — 77336 RADIATION PHYSICS CONSULT: CPT | Performed by: RADIOLOGY

## 2021-09-15 PROCEDURE — 77412 RADIATION TX DELIVERY LVL 3: CPT | Performed by: RADIOLOGY

## 2021-09-15 PROCEDURE — 77014 PR CT GUIDANCE PLACEMENT RAD THERAPY FIELDS: CPT | Mod: 26 | Performed by: RADIOLOGY

## 2021-09-15 ASSESSMENT — PAIN SCALES - GENERAL: PAINLEVEL: NO PAIN

## 2021-09-15 NOTE — ON TREATMENT VISIT
ON TREATMENT NOTE  RADIATION ONCOLOGY DEPARTMENT    Patient name:  Ashleigh Marquis    Primary Physician:  PAM Oropeza MRN: [id number]  [facility]: [id number]   Referring physician:  Hi Valle M.D. : 1960, 60 y.o.     ENCOUNTER DATE:  09/15/21    DIAGNOSIS:    Malignant neoplasm of central portion of breast in female, estrogen receptor negative (HCC)  Staging form: Breast, AJCC 8th Edition  - Pathologic: Stage IIA (pT2, pN0(sn), cM0, G3, ER-, [place]-, HER2-) - Signed by Ashleigh Russell M.D. on 2021  Stage prefix: Initial diagnosis  Method of lymph node assessment: [place] lymph node biopsy  Histologic grading system: 3 grade system      TREATMENT SUMMARY:  [facility] Treatment Information        Some values may be hidden. Unless noted otherwise, only the newest values recorded on each date are displayed.         [facility] Treatment Summary 9/15/21   Course First Treatment Date 2021   Course Last Treatment Date 09/15/2021   Rt Breast Plan from Course C1_R_breast   Rt Brst Bst Plan from Course C1_R_breast   Fraction 3 of 5   Elapsed Course Days  @ [id number]   Prescribed Fraction Dose 200 cGy   Prescribed Total Dose 1,000 cGy   Rt Breast Reference Point from Course C1_R_breast   Rt Breast CP Reference Point from Course C1_R_breast   Rt Brst Bst Reference Point from Course C1_R_breast   Elapsed Course Days  @ [id number]   Session Dose 200 cGy   Total Dose 600 cGy   Rt Brst Bst CP Reference Point from Course C1_R_breast   Elapsed Course Days  @ [id number]   Session Dose 211 cGy   Total Dose 634 cGy              SUBJECTIVE:   Patient continues to have erythema in the radiation treatment area.  She is allergic to sulfa so cannot do Silvadene cream.  She is having topical patches applied after her radiation treatments.    VITAL SIGNS:    Encounter Vitals  Temperature: 36.4 °C (97.6 °F)  Temp src: Temporal  Blood Pressure: 121/72  Pulse: 79  Pulse Oximetry: 95 %  Pain Score: No pain  Pain  Assessment 9/15/2021 9/8/2021 9/1/2021 7/26/2021   Pain Score 0 0 7 8   Pain Loc - - Breast (No Data)   Some recent data might be hidden          PHYSICAL EXAM:  Moderate erythema in the treatment field    TOXICITY  Toxicity Assessment 9/15/2021 9/8/2021 9/1/2021 8/25/2021   Toxicity Assessment Breast Breast Breast Breast   Fatigue (lethargy, malaise, asthenia) Increased fatigue over baseline, but not altering normal activities Increased fatigue over baseline, but not altering normal activities Increased fatigue over baseline, but not altering normal activities None   Fever (in the absence of neutropenia) None None None None   Radiation Dermatitis Faint erythema or dry desquamation None None None   Lymphatics Normal Normal Normal Normal   RT - Pain due to RT None None None None   Dyspnea Normal Normal Normal Normal         IMPRESSION:  Cancer Staging  Malignant neoplasm of central portion of breast in female, estrogen receptor negative (HCC)  Staging form: Breast, AJCC 8th Edition  - Pathologic: Stage IIA (pT2, pN0(sn), cM0, G3, ER-, [place]-, HER2-) - Signed by Ashleigh Russell M.D. on 4/28/2021      PLAN:  No change in treatment plan    Disposition:  Treatment plan reviewed. Questions answered. Continue therapy outlined.     Jim Bahena M.D.    No orders of the defined types were placed in this encounter.

## 2021-09-15 NOTE — TELEPHONE ENCOUNTER
Nutrition Services: Telephone Encounter     RD received message from Eliza TSARKEY stating that pt requested visit from RD. RD discussed is unable to visit pt at this time due to full schedule, though may be able to give a call in the afternoon if it is not an urgent issue. Eliza states pt did not provide reason for requested visit.     RD attempted to call pt, pt did not answer. RD left voicemail with contact info.   [phone]

## 2021-09-17 ENCOUNTER — DOCUMENTATION (OUTPATIENT)
Dept: RADIATION ONCOLOGY | Facility: MEDICAL CENTER | Age: 61
End: 2021-09-17

## 2021-09-17 ENCOUNTER — HOSPITAL ENCOUNTER (OUTPATIENT)
Dept: RADIATION ONCOLOGY | Facility: MEDICAL CENTER | Age: 61
End: 2021-09-17
Payer: MEDICAID

## 2021-09-17 LAB
CHEMOTHERAPY INFUSION START DATE: NORMAL
CHEMOTHERAPY RECORDS: 1000
CHEMOTHERAPY RECORDS: 2
CHEMOTHERAPY RECORDS: NORMAL
CHEMOTHERAPY RX CANCER: NORMAL
DATE 1ST CHEMO CANCER: NORMAL
RAD ONC ARIA COURSE LAST TREATMENT DATE: NORMAL
RAD ONC ARIA COURSE TREATMENT ELAPSED DAYS: NORMAL
RAD ONC ARIA REFERENCE POINT DOSAGE GIVEN TO DATE: 8
RAD ONC ARIA REFERENCE POINT DOSAGE GIVEN TO DATE: 8.46
RAD ONC ARIA REFERENCE POINT ID: NORMAL
RAD ONC ARIA REFERENCE POINT ID: NORMAL
RAD ONC ARIA REFERENCE POINT SESSION DOSAGE GIVEN: 2
RAD ONC ARIA REFERENCE POINT SESSION DOSAGE GIVEN: 2.11

## 2021-09-17 PROCEDURE — 77412 RADIATION TX DELIVERY LVL 3: CPT | Performed by: RADIOLOGY

## 2021-09-17 PROCEDURE — 77387 GUIDANCE FOR RADJ TX DLVR: CPT | Performed by: RADIOLOGY

## 2021-09-17 PROCEDURE — 77014 PR CT GUIDANCE PLACEMENT RAD THERAPY FIELDS: CPT | Mod: 26 | Performed by: RADIOLOGY

## 2021-09-17 NOTE — PROGRESS NOTES
Nutrition Services:  RN informed me that patient with questions. Met with patient following XRT. She requests rx for carchest to obtain supplements. Discussed with Dr. Esqueda who wrote Rx for her.  Patient provided with Rx for boost, ensure or equivalent.     RD is available PRN

## 2021-09-20 ENCOUNTER — HOSPITAL ENCOUNTER (OUTPATIENT)
Dept: RADIATION ONCOLOGY | Facility: MEDICAL CENTER | Age: 61
End: 2021-09-20
Payer: MEDICAID

## 2021-09-20 LAB
CHEMOTHERAPY INFUSION START DATE: NORMAL
CHEMOTHERAPY RECORDS: 1000
CHEMOTHERAPY RECORDS: 2
CHEMOTHERAPY RECORDS: NORMAL
CHEMOTHERAPY RX CANCER: NORMAL
DATE 1ST CHEMO CANCER: NORMAL
RAD ONC ARIA COURSE LAST TREATMENT DATE: NORMAL
RAD ONC ARIA COURSE TREATMENT ELAPSED DAYS: NORMAL
RAD ONC ARIA REFERENCE POINT DOSAGE GIVEN TO DATE: 10
RAD ONC ARIA REFERENCE POINT DOSAGE GIVEN TO DATE: 10.57
RAD ONC ARIA REFERENCE POINT ID: NORMAL
RAD ONC ARIA REFERENCE POINT ID: NORMAL
RAD ONC ARIA REFERENCE POINT SESSION DOSAGE GIVEN: 2
RAD ONC ARIA REFERENCE POINT SESSION DOSAGE GIVEN: 2.11

## 2021-09-20 PROCEDURE — 77014 PR CT GUIDANCE PLACEMENT RAD THERAPY FIELDS: CPT | Mod: 26 | Performed by: RADIOLOGY

## 2021-09-20 PROCEDURE — 77427 RADIATION TX MANAGEMENT X5: CPT | Performed by: RADIOLOGY

## 2021-09-20 PROCEDURE — 77412 RADIATION TX DELIVERY LVL 3: CPT | Performed by: RADIOLOGY

## 2021-09-20 PROCEDURE — 77387 GUIDANCE FOR RADJ TX DLVR: CPT | Performed by: RADIOLOGY

## 2021-09-24 LAB
CHEMOTHERAPY INFUSION START DATE: NORMAL
CHEMOTHERAPY INFUSION STOP DATE: NORMAL
CHEMOTHERAPY RECORDS: 1000
CHEMOTHERAPY RECORDS: 2
CHEMOTHERAPY RECORDS: 2.67
CHEMOTHERAPY RECORDS: 4005
CHEMOTHERAPY RECORDS: NORMAL
CHEMOTHERAPY RX CANCER: NORMAL
DATE 1ST CHEMO CANCER: NORMAL
RAD ONC ARIA COURSE LAST TREATMENT DATE: NORMAL
RAD ONC ARIA COURSE TREATMENT ELAPSED DAYS: NORMAL
RAD ONC ARIA REFERENCE POINT DOSAGE GIVEN TO DATE: 10
RAD ONC ARIA REFERENCE POINT DOSAGE GIVEN TO DATE: 10.57
RAD ONC ARIA REFERENCE POINT DOSAGE GIVEN TO DATE: 40.05
RAD ONC ARIA REFERENCE POINT DOSAGE GIVEN TO DATE: 40.05
RAD ONC ARIA REFERENCE POINT ID: NORMAL

## 2021-11-02 ENCOUNTER — HOSPITAL ENCOUNTER (OUTPATIENT)
Dept: RADIATION ONCOLOGY | Facility: MEDICAL CENTER | Age: 61
End: 2021-11-30
Attending: RADIOLOGY
Payer: MEDICAID

## 2021-11-02 VITALS
SYSTOLIC BLOOD PRESSURE: 140 MMHG | OXYGEN SATURATION: 96 % | HEART RATE: 90 BPM | DIASTOLIC BLOOD PRESSURE: 76 MMHG | TEMPERATURE: 97.2 F | BODY MASS INDEX: 32.52 KG/M2 | WEIGHT: 187 LBS

## 2021-11-02 PROBLEM — C50.211 MALIGNANT NEOPLASM OF UPPER-INNER QUADRANT OF RIGHT BREAST IN FEMALE, ESTROGEN RECEPTOR NEGATIVE (HCC): Status: ACTIVE | Noted: 2021-07-26

## 2021-11-02 PROBLEM — Z17.1 MALIGNANT NEOPLASM OF UPPER-INNER QUADRANT OF RIGHT BREAST IN FEMALE, ESTROGEN RECEPTOR NEGATIVE (HCC): Status: ACTIVE | Noted: 2021-07-26

## 2021-11-02 PROCEDURE — 99212 OFFICE O/P EST SF 10 MIN: CPT | Performed by: RADIOLOGY

## 2021-11-02 ASSESSMENT — FIBROSIS 4 INDEX: FIB4 SCORE: 1.78

## 2021-11-02 ASSESSMENT — PAIN SCALES - GENERAL: PAINLEVEL: 7=MODERATE-SEVERE PAIN

## 2021-11-02 NOTE — NON-PROVIDER
Patient was seen today in clinic with Dr. Russell for follow-up.  Vitals signs and weight were obtained and pain assessment was completed.  Allergies and medications were reviewed with the patient.     Vitals/Pain:  Vitals:    11/02/21 1508   BP: 140/76   BP Location: Left arm   Patient Position: Sitting   BP Cuff Size: Adult   Pulse: 90   Temp: 36.2 °C (97.2 °F)   TempSrc: Temporal   SpO2: 96%   Weight: 84.8 kg (187 lb)   Pain Score: 7=Moderate-Severe Pain  Pain Scale: 0-10  Pain Assessement: Initial  Pain Location, Orientation and Scale: Breast: Right and Lower : Acute : 7  What makes the pain better: Tylenol  What makes the pain worse: Movement    Allergies:   Sulfa drugs and Toradol    Current Medications:  Current Outpatient Medications   Medication Sig Dispense Refill   • ANTACID MAXIMUM STRENGTH 400-400-40 MG/5ML Suspension      • Buprenorphine 10 MCG/HR PATCH WEEKLY APPLY 1 PATCH TRANSDERMAL ONCE EVERY 7 DAYS     • VENTOLIN  (90 Base) MCG/ACT Aero Soln inhalation aerosol      • lisinopril (PRINIVIL) 20 MG Tab      • gabapentin (NEURONTIN) 400 MG Cap      • dexamethasone (DECADRON) 4 MG Tab Take 2 mg by mouth 2 times a day.     • acetaminophen (TYLENOL) 325 MG Tab Take 650 mg by mouth every four hours as needed.     • Multiple Vitamin (MULTIVITAMIN ADULT PO) Take  by mouth.     • DIPHENHIST 25 MG capsule  (Patient not taking: Reported on 5/17/2021)     • chlorhexidine (PERIDEX) 0.12 % Solution SWISH 15 ML IN MOUTH FOR 3O SECONDS THEN SPIT TWICE DAILY     • flurazepam (DALMANE) 30 MG capsule TAKE 1 CAPSULE BY MOUTH EVERY DAY AT BEDTIME F51.01     • loratadine (CLARITIN) 10 MG Tab      • NARCAN 4 MG/0.1ML Liquid ADMINISTER A SINGLE SPRAY INTRANASALLY INTO ONE NOSTRIL. CALL 911. MAY REPEAT X1. (Patient not taking: Reported on 7/5/2021)     • prochlorperazine (COMPAZINE) 10 MG Tab      • clonazePAM (KLONOPIN) 1 MG Tab Take 1 mg by mouth 2 Times a Day.     • lisinopril (PRINIVIL) 10 MG Tab Take 10 mg by  mouth every day. (Patient not taking: Reported on 7/5/2021)     • gabapentin (NEURONTIN) 300 MG Cap Take 300 mg by mouth 3 times a day.     • fluoxetine (PROZAC) 40 MG capsule Take 40 mg by mouth every day.     • ondansetron (ZOFRAN ODT) 4 MG TABLET DISPERSIBLE Take 1 Tab by mouth every 6 hours as needed for Nausea. 20 Tab 0   • omeprazole (PRILOSEC) 20 MG delayed-release capsule Take 2 Caps by mouth 2 Times a Day. 30 Cap 0     No current facility-administered medications for this encounter.         PCP:  Sunny Jimenez R.N.

## 2021-11-02 NOTE — PROGRESS NOTES
RADIATION ONCOLOGY FOLLOW-UP    DATE OF SERVICE: 11/2/2021    IDENTIFICATION:   A 60 y.o. female with T2N0 triple negative grade 3 right breast cancer status post lumpectomy and sentinel node dissection and TC chemotherapy.Status post radiation therapy complete 9/20/2021.    HISTORY OF PRESENT ILLNESS:   Since last seen patient has been doing fairly well.  She has still some tenderness in her breast she says about a 7 out of 10 and she sometimes wakes up with headaches.  She has fatigue hot flashes dry mouth dental issues she has some hearing loss but has hearing aids.  She has occasional abdominal pain diarrhea heartburn some nausea but the omeprazole helps.  She is got hemorrhoids urinary frequency urgency nocturia incontinence sometimes.  She has muscle pain joint pain joint swelling sometimes and sometimes neck pain occasional headaches and dizziness.  She has neuropathy memory loss previously had a seizure long ago.  She has occasional shortness of breath and wheezing and depression and anxiety.    CURRENT MEDICATIONS:  Current Outpatient Medications   Medication Sig Dispense Refill   • ANTACID MAXIMUM STRENGTH 400-400-40 MG/5ML Suspension      • Buprenorphine 10 MCG/HR PATCH WEEKLY APPLY 1 PATCH TRANSDERMAL ONCE EVERY 7 DAYS     • VENTOLIN  (90 Base) MCG/ACT Aero Soln inhalation aerosol      • lisinopril (PRINIVIL) 20 MG Tab      • gabapentin (NEURONTIN) 400 MG Cap      • dexamethasone (DECADRON) 4 MG Tab Take 2 mg by mouth 2 times a day.     • acetaminophen (TYLENOL) 325 MG Tab Take 650 mg by mouth every four hours as needed.     • Multiple Vitamin (MULTIVITAMIN ADULT PO) Take  by mouth.     • DIPHENHIST 25 MG capsule  (Patient not taking: Reported on 5/17/2021)     • chlorhexidine (PERIDEX) 0.12 % Solution SWISH 15 ML IN MOUTH FOR 3O SECONDS THEN SPIT TWICE DAILY     • flurazepam (DALMANE) 30 MG capsule TAKE 1 CAPSULE BY MOUTH EVERY DAY AT BEDTIME F51.01     • loratadine (CLARITIN) 10 MG Tab      •  NARCAN 4 MG/0.1ML Liquid ADMINISTER A SINGLE SPRAY INTRANASALLY INTO ONE NOSTRIL. CALL 911. MAY REPEAT X1. (Patient not taking: Reported on 7/5/2021)     • prochlorperazine (COMPAZINE) 10 MG Tab      • clonazePAM (KLONOPIN) 1 MG Tab Take 1 mg by mouth 2 Times a Day.     • lisinopril (PRINIVIL) 10 MG Tab Take 10 mg by mouth every day. (Patient not taking: Reported on 7/5/2021)     • gabapentin (NEURONTIN) 300 MG Cap Take 300 mg by mouth 3 times a day.     • fluoxetine (PROZAC) 40 MG capsule Take 40 mg by mouth every day.     • ondansetron (ZOFRAN ODT) 4 MG TABLET DISPERSIBLE Take 1 Tab by mouth every 6 hours as needed for Nausea. 20 Tab 0   • omeprazole (PRILOSEC) 20 MG delayed-release capsule Take 2 Caps by mouth 2 Times a Day. 30 Cap 0     No current facility-administered medications for this encounter.       ALLERGIES:  Sulfa drugs and Toradol    FAMILY HISTORY:    Family History   Problem Relation Age of Onset   • Heart Disease Father    • Hypertension Father    • Diabetes Father    • Cancer Paternal Grandmother    • Depression Other    • Lung Disease Neg Hx    • Stroke Neg Hx    [unfilled]        SOCIAL HISTORY:     reports that she has been smoking cigarettes. She has a 10.00 pack-year smoking history. She has never used smokeless tobacco. She reports current alcohol use. She reports that she does not use drugs.    PAIN: As described above    REVIEW OF SYSTEMS: Is significant for As described in HPI  The rest of the review of systems has been reviewed.    PHYSICAL EXAM:     ECOG PERFORMANCE STATUS:  0= Fully active, able to carry on all pre-disease performance without restriction.   Vitals:    11/02/21 1508   BP: 140/76   BP Location: Left arm   Patient Position: Sitting   BP Cuff Size: Adult   Pulse: 90   Temp: 36.2 °C (97.2 °F)   TempSrc: Temporal   SpO2: 96%   Weight: 84.8 kg (187 lb)   Pain Score: 7=Moderate-Severe Pain        GENERAL: Well-appearing alert oriented x3 anxious  Breasts: Bilaterally  pendulous she has fibroglandular changes in both breasts.  She does have a scar above the nipple on the right.  All this is well-healed and she has excellent cosmesis no discrete masses appreciated but the tenderness that she noted is at the area of the scar.  HEENT:  Pupils are equal, round, and reactive to light.  Extraocular muscles   are intact. Sclerae nonicteric.  Conjunctivae pink.  Oral cavity, tongue   protrudes midline.   NECK:  No preauricular neck supraclavicular axillary adenopathy.  LUNGS:  Clear to ascultation   HEART:  Regular rate and rhythm.  No murmur appreciated  ABDOMEN:  Soft. No evidence of hepatosplenomegaly.   EXTREMITIES:  Without Edema.  NEUROLOGIC:  Cranial nerves II through XII were intact. Normal stance and gait motor and sensory grossly within normal limits      IMPRESSION:    A 60 y.o. female with T2N0 triple negative grade 3 right breast cancer status post lumpectomy and sentinel node dissection and TC chemotherapy.Status post radiation therapy complete 9/20/2021.    RECOMMENDATIONS:     I explained to her that she is good to be followed by national cancer guidelines with Dr. Nicolas and Arash.I explained to her that she is good to be followed by national cancer guidelines with Dr. Fidencio Valle. I explained that the tenderness she has in the breast is not surprising and hopefully with time that should get better. I am happy to see her on an as-needed basis.      Thank you for the opportunity to participate in her care.  If any questions or comments, please do not hesitate in calling.      Please note that this dictation was created using voice recognition software. I have made every reasonable attempt to correct obvious errors, but I expect that there are errors of grammar and possibly content that I did not discover before finalizing the note.

## 2022-01-01 ENCOUNTER — HOSPICE ADMISSION (OUTPATIENT)
Dept: HOSPICE | Facility: HOSPICE | Age: 62
End: 2022-01-01
Payer: MEDICAID

## 2022-01-01 ENCOUNTER — TELEPHONE (OUTPATIENT)
Dept: HEMATOLOGY ONCOLOGY | Facility: MEDICAL CENTER | Age: 62
End: 2022-01-01
Payer: MEDICAID

## 2022-01-01 ENCOUNTER — HOSPITAL ENCOUNTER (OUTPATIENT)
Dept: RADIOLOGY | Facility: MEDICAL CENTER | Age: 62
End: 2022-07-11
Attending: INTERNAL MEDICINE
Payer: MEDICAID

## 2022-01-01 ENCOUNTER — HOME CARE VISIT (OUTPATIENT)
Dept: HOSPICE | Facility: HOSPICE | Age: 62
End: 2022-01-01
Payer: MEDICAID

## 2022-01-01 ENCOUNTER — APPOINTMENT (OUTPATIENT)
Dept: RADIOLOGY | Facility: MEDICAL CENTER | Age: 62
DRG: 208 | End: 2022-01-01
Attending: INTERNAL MEDICINE
Payer: MEDICAID

## 2022-01-01 ENCOUNTER — ANESTHESIA EVENT (OUTPATIENT)
Dept: SURGERY | Facility: MEDICAL CENTER | Age: 62
DRG: 393 | End: 2022-01-01
Payer: MEDICAID

## 2022-01-01 ENCOUNTER — ANESTHESIA EVENT (OUTPATIENT)
Dept: SURGERY | Facility: MEDICAL CENTER | Age: 62
DRG: 377 | End: 2022-01-01
Payer: MEDICAID

## 2022-01-01 ENCOUNTER — TELEPHONE (OUTPATIENT)
Dept: HEMATOLOGY ONCOLOGY | Facility: MEDICAL CENTER | Age: 62
End: 2022-01-01

## 2022-01-01 ENCOUNTER — APPOINTMENT (OUTPATIENT)
Dept: CARDIOLOGY | Facility: MEDICAL CENTER | Age: 62
DRG: 208 | End: 2022-01-01
Attending: HOSPITALIST
Payer: MEDICAID

## 2022-01-01 ENCOUNTER — APPOINTMENT (OUTPATIENT)
Dept: RADIOLOGY | Facility: MEDICAL CENTER | Age: 62
DRG: 208 | End: 2022-01-01
Attending: HOSPITALIST
Payer: MEDICAID

## 2022-01-01 ENCOUNTER — APPOINTMENT (OUTPATIENT)
Dept: RADIOLOGY | Facility: MEDICAL CENTER | Age: 62
DRG: 208 | End: 2022-01-01
Payer: MEDICAID

## 2022-01-01 ENCOUNTER — TELEPHONE (OUTPATIENT)
Dept: ONCOLOGY | Facility: MEDICAL CENTER | Age: 62
End: 2022-01-01
Payer: MEDICAID

## 2022-01-01 ENCOUNTER — APPOINTMENT (OUTPATIENT)
Dept: RADIOLOGY | Facility: MEDICAL CENTER | Age: 62
End: 2022-01-01
Attending: NURSE PRACTITIONER
Payer: MEDICAID

## 2022-01-01 ENCOUNTER — PATIENT OUTREACH (OUTPATIENT)
Dept: OTHER | Facility: MEDICAL CENTER | Age: 62
End: 2022-01-01
Payer: MEDICAID

## 2022-01-01 ENCOUNTER — PATIENT OUTREACH (OUTPATIENT)
Dept: ONCOLOGY | Facility: MEDICAL CENTER | Age: 62
End: 2022-01-01
Payer: MEDICAID

## 2022-01-01 ENCOUNTER — HOSPITAL ENCOUNTER (OUTPATIENT)
Dept: RADIOLOGY | Facility: MEDICAL CENTER | Age: 62
End: 2022-01-12
Payer: MEDICAID

## 2022-01-01 ENCOUNTER — APPOINTMENT (OUTPATIENT)
Dept: HEMATOLOGY ONCOLOGY | Facility: MEDICAL CENTER | Age: 62
End: 2022-01-01
Payer: MEDICAID

## 2022-01-01 ENCOUNTER — HOSPITAL ENCOUNTER (INPATIENT)
Facility: MEDICAL CENTER | Age: 62
LOS: 14 days | DRG: 377 | End: 2022-12-13
Attending: EMERGENCY MEDICINE | Admitting: HOSPITALIST
Payer: MEDICAID

## 2022-01-01 ENCOUNTER — APPOINTMENT (OUTPATIENT)
Dept: ONCOLOGY | Facility: MEDICAL CENTER | Age: 62
End: 2022-01-01
Attending: INTERNAL MEDICINE
Payer: MEDICAID

## 2022-01-01 ENCOUNTER — APPOINTMENT (OUTPATIENT)
Dept: RADIOLOGY | Facility: MEDICAL CENTER | Age: 62
DRG: 393 | End: 2022-01-01
Attending: HOSPITALIST
Payer: MEDICAID

## 2022-01-01 ENCOUNTER — HOSPITAL ENCOUNTER (OUTPATIENT)
Facility: MEDICAL CENTER | Age: 62
End: 2022-08-24
Attending: INTERNAL MEDICINE
Payer: MEDICAID

## 2022-01-01 ENCOUNTER — HOSPITAL ENCOUNTER (OUTPATIENT)
Dept: HEMATOLOGY ONCOLOGY | Facility: MEDICAL CENTER | Age: 62
End: 2022-07-14
Attending: INTERNAL MEDICINE
Payer: MEDICAID

## 2022-01-01 ENCOUNTER — ANESTHESIA (OUTPATIENT)
Dept: SURGERY | Facility: MEDICAL CENTER | Age: 62
DRG: 377 | End: 2022-01-01
Payer: MEDICAID

## 2022-01-01 ENCOUNTER — HOSPITAL ENCOUNTER (OUTPATIENT)
Dept: HEMATOLOGY ONCOLOGY | Facility: MEDICAL CENTER | Age: 62
End: 2022-08-16
Attending: NURSE PRACTITIONER
Payer: MEDICAID

## 2022-01-01 ENCOUNTER — HOSPITAL ENCOUNTER (OUTPATIENT)
Dept: RADIOLOGY | Facility: MEDICAL CENTER | Age: 62
End: 2022-04-11
Attending: INTERNAL MEDICINE
Payer: MEDICAID

## 2022-01-01 ENCOUNTER — APPOINTMENT (OUTPATIENT)
Dept: RADIOLOGY | Facility: MEDICAL CENTER | Age: 62
DRG: 377 | End: 2022-01-01
Attending: INTERNAL MEDICINE
Payer: MEDICAID

## 2022-01-01 ENCOUNTER — HOSPITAL ENCOUNTER (OUTPATIENT)
Dept: RADIOLOGY | Facility: MEDICAL CENTER | Age: 62
End: 2022-09-27
Attending: NURSE PRACTITIONER
Payer: MEDICAID

## 2022-01-01 ENCOUNTER — HOSPITAL ENCOUNTER (INPATIENT)
Facility: MEDICAL CENTER | Age: 62
LOS: 3 days | DRG: 393 | End: 2022-10-23
Attending: EMERGENCY MEDICINE | Admitting: INTERNAL MEDICINE
Payer: MEDICAID

## 2022-01-01 ENCOUNTER — APPOINTMENT (OUTPATIENT)
Dept: RADIOLOGY | Facility: MEDICAL CENTER | Age: 62
End: 2022-01-01
Attending: RADIOLOGY
Payer: MEDICAID

## 2022-01-01 ENCOUNTER — HOSPITAL ENCOUNTER (INPATIENT)
Facility: MEDICAL CENTER | Age: 62
LOS: 21 days | DRG: 208 | End: 2022-11-24
Attending: EMERGENCY MEDICINE | Admitting: INTERNAL MEDICINE
Payer: MEDICAID

## 2022-01-01 ENCOUNTER — APPOINTMENT (OUTPATIENT)
Dept: RADIOLOGY | Facility: MEDICAL CENTER | Age: 62
End: 2022-01-01
Attending: INTERNAL MEDICINE
Payer: MEDICAID

## 2022-01-01 ENCOUNTER — HOSPITAL ENCOUNTER (OUTPATIENT)
Facility: MEDICAL CENTER | Age: 62
End: 2022-01-01
Payer: MEDICAID

## 2022-01-01 ENCOUNTER — HOSPITAL ENCOUNTER (OUTPATIENT)
Dept: HEMATOLOGY ONCOLOGY | Facility: MEDICAL CENTER | Age: 62
End: 2022-08-08
Attending: NURSE PRACTITIONER
Payer: MEDICAID

## 2022-01-01 ENCOUNTER — APPOINTMENT (OUTPATIENT)
Dept: RADIOLOGY | Facility: MEDICAL CENTER | Age: 62
DRG: 377 | End: 2022-01-01
Attending: EMERGENCY MEDICINE
Payer: MEDICAID

## 2022-01-01 ENCOUNTER — APPOINTMENT (OUTPATIENT)
Dept: RADIOLOGY | Facility: MEDICAL CENTER | Age: 62
DRG: 393 | End: 2022-01-01
Attending: EMERGENCY MEDICINE
Payer: MEDICAID

## 2022-01-01 ENCOUNTER — PRE-ADMISSION TESTING (OUTPATIENT)
Dept: ADMISSIONS | Facility: MEDICAL CENTER | Age: 62
End: 2022-01-01
Attending: INTERNAL MEDICINE
Payer: MEDICAID

## 2022-01-01 ENCOUNTER — HOSPITAL ENCOUNTER (EMERGENCY)
Facility: MEDICAL CENTER | Age: 62
End: 2022-01-24
Attending: EMERGENCY MEDICINE
Payer: MEDICAID

## 2022-01-01 ENCOUNTER — APPOINTMENT (OUTPATIENT)
Dept: RADIOLOGY | Facility: MEDICAL CENTER | Age: 62
End: 2022-01-01
Attending: EMERGENCY MEDICINE
Payer: MEDICAID

## 2022-01-01 ENCOUNTER — APPOINTMENT (OUTPATIENT)
Dept: RADIOLOGY | Facility: MEDICAL CENTER | Age: 62
DRG: 208 | End: 2022-01-01
Attending: EMERGENCY MEDICINE
Payer: MEDICAID

## 2022-01-01 ENCOUNTER — HOSPITAL ENCOUNTER (OUTPATIENT)
Facility: MEDICAL CENTER | Age: 62
End: 2022-03-08
Attending: INTERNAL MEDICINE
Payer: MEDICAID

## 2022-01-01 ENCOUNTER — HOSPITAL ENCOUNTER (OUTPATIENT)
Dept: HEMATOLOGY ONCOLOGY | Facility: MEDICAL CENTER | Age: 62
End: 2022-08-23
Attending: NURSE PRACTITIONER
Payer: MEDICAID

## 2022-01-01 ENCOUNTER — ANESTHESIA (OUTPATIENT)
Dept: SURGERY | Facility: MEDICAL CENTER | Age: 62
DRG: 208 | End: 2022-01-01
Payer: MEDICAID

## 2022-01-01 ENCOUNTER — HOSPITAL ENCOUNTER (OUTPATIENT)
Facility: MEDICAL CENTER | Age: 62
End: 2022-06-14
Attending: NURSE PRACTITIONER | Admitting: RADIOLOGY
Payer: MEDICAID

## 2022-01-01 ENCOUNTER — HOSPITAL ENCOUNTER (OUTPATIENT)
Dept: LAB | Facility: MEDICAL CENTER | Age: 62
End: 2022-06-27
Attending: INTERNAL MEDICINE
Payer: MEDICAID

## 2022-01-01 ENCOUNTER — HOSPITAL ENCOUNTER (OUTPATIENT)
Dept: RADIOLOGY | Facility: MEDICAL CENTER | Age: 62
End: 2022-01-24
Attending: RADIOLOGY
Payer: MEDICAID

## 2022-01-01 ENCOUNTER — HOSPITAL ENCOUNTER (OUTPATIENT)
Dept: RADIOLOGY | Facility: MEDICAL CENTER | Age: 62
End: 2022-06-06
Attending: INTERNAL MEDICINE

## 2022-01-01 ENCOUNTER — APPOINTMENT (OUTPATIENT)
Dept: RADIOLOGY | Facility: MEDICAL CENTER | Age: 62
DRG: 208 | End: 2022-01-01
Attending: STUDENT IN AN ORGANIZED HEALTH CARE EDUCATION/TRAINING PROGRAM
Payer: MEDICAID

## 2022-01-01 ENCOUNTER — OFFICE VISIT (OUTPATIENT)
Dept: HEMATOLOGY ONCOLOGY | Facility: MEDICAL CENTER | Age: 62
End: 2022-01-01
Payer: MEDICAID

## 2022-01-01 ENCOUNTER — HOSPITAL ENCOUNTER (OUTPATIENT)
Facility: MEDICAL CENTER | Age: 62
End: 2022-08-11
Attending: NURSE PRACTITIONER
Payer: MEDICAID

## 2022-01-01 ENCOUNTER — PATIENT OUTREACH (OUTPATIENT)
Dept: OTHER | Facility: MEDICAL CENTER | Age: 62
End: 2022-01-01

## 2022-01-01 ENCOUNTER — ANESTHESIA EVENT (OUTPATIENT)
Dept: SURGERY | Facility: MEDICAL CENTER | Age: 62
DRG: 208 | End: 2022-01-01
Payer: MEDICAID

## 2022-01-01 ENCOUNTER — HOSPITAL ENCOUNTER (OUTPATIENT)
Dept: HEMATOLOGY ONCOLOGY | Facility: MEDICAL CENTER | Age: 62
End: 2022-09-15
Attending: INTERNAL MEDICINE
Payer: MEDICAID

## 2022-01-01 ENCOUNTER — HOSPITAL ENCOUNTER (INPATIENT)
Facility: MEDICAL CENTER | Age: 62
LOS: 6 days | DRG: 951 | End: 2022-12-19
Attending: STUDENT IN AN ORGANIZED HEALTH CARE EDUCATION/TRAINING PROGRAM | Admitting: STUDENT IN AN ORGANIZED HEALTH CARE EDUCATION/TRAINING PROGRAM
Payer: COMMERCIAL

## 2022-01-01 ENCOUNTER — HOSPITAL ENCOUNTER (EMERGENCY)
Facility: MEDICAL CENTER | Age: 62
End: 2022-08-08
Attending: EMERGENCY MEDICINE
Payer: MEDICAID

## 2022-01-01 ENCOUNTER — ANESTHESIA (OUTPATIENT)
Dept: SURGERY | Facility: MEDICAL CENTER | Age: 62
DRG: 393 | End: 2022-01-01
Payer: MEDICAID

## 2022-01-01 ENCOUNTER — HOSPITAL ENCOUNTER (OUTPATIENT)
Dept: RADIOLOGY | Facility: MEDICAL CENTER | Age: 62
End: 2022-01-24
Attending: NURSE PRACTITIONER
Payer: MEDICAID

## 2022-01-01 ENCOUNTER — HOSPITAL ENCOUNTER (OUTPATIENT)
Dept: RADIOLOGY | Facility: MEDICAL CENTER | Age: 62
End: 2022-10-05
Attending: INTERNAL MEDICINE
Payer: MEDICAID

## 2022-01-01 ENCOUNTER — HOSPITAL ENCOUNTER (OUTPATIENT)
Facility: MEDICAL CENTER | Age: 62
End: 2022-01-21
Attending: INTERNAL MEDICINE | Admitting: RADIOLOGY
Payer: MEDICAID

## 2022-01-01 ENCOUNTER — HOSPITAL ENCOUNTER (EMERGENCY)
Facility: MEDICAL CENTER | Age: 62
End: 2022-09-02
Attending: EMERGENCY MEDICINE
Payer: MEDICAID

## 2022-01-01 ENCOUNTER — APPOINTMENT (OUTPATIENT)
Dept: RADIOLOGY | Facility: MEDICAL CENTER | Age: 62
DRG: 377 | End: 2022-01-01
Attending: HOSPITALIST
Payer: MEDICAID

## 2022-01-01 ENCOUNTER — HOSPITAL ENCOUNTER (OUTPATIENT)
Dept: RADIOLOGY | Facility: MEDICAL CENTER | Age: 62
End: 2022-04-11
Attending: RADIOLOGY
Payer: MEDICAID

## 2022-01-01 ENCOUNTER — HOSPITAL ENCOUNTER (OUTPATIENT)
Dept: HEMATOLOGY ONCOLOGY | Facility: MEDICAL CENTER | Age: 62
End: 2022-10-11
Attending: INTERNAL MEDICINE
Payer: MEDICAID

## 2022-01-01 VITALS
DIASTOLIC BLOOD PRESSURE: 86 MMHG | WEIGHT: 189.6 LBS | SYSTOLIC BLOOD PRESSURE: 146 MMHG | HEART RATE: 98 BPM | TEMPERATURE: 97 F | OXYGEN SATURATION: 98 % | HEIGHT: 64 IN | BODY MASS INDEX: 32.37 KG/M2 | RESPIRATION RATE: 18 BRPM

## 2022-01-01 VITALS
OXYGEN SATURATION: 100 % | HEART RATE: 104 BPM | WEIGHT: 159.17 LBS | SYSTOLIC BLOOD PRESSURE: 102 MMHG | RESPIRATION RATE: 17 BRPM | DIASTOLIC BLOOD PRESSURE: 69 MMHG | BODY MASS INDEX: 25.7 KG/M2 | TEMPERATURE: 97.1 F

## 2022-01-01 VITALS
SYSTOLIC BLOOD PRESSURE: 151 MMHG | TEMPERATURE: 97 F | RESPIRATION RATE: 14 BRPM | DIASTOLIC BLOOD PRESSURE: 69 MMHG | WEIGHT: 188 LBS | OXYGEN SATURATION: 90 % | HEART RATE: 63 BPM | BODY MASS INDEX: 31.32 KG/M2 | HEIGHT: 65 IN

## 2022-01-01 VITALS
WEIGHT: 200 LBS | RESPIRATION RATE: 17 BRPM | OXYGEN SATURATION: 100 % | DIASTOLIC BLOOD PRESSURE: 60 MMHG | SYSTOLIC BLOOD PRESSURE: 131 MMHG | BODY MASS INDEX: 34.15 KG/M2 | HEIGHT: 64 IN | HEART RATE: 90 BPM

## 2022-01-01 VITALS
HEIGHT: 63 IN | WEIGHT: 195.77 LBS | TEMPERATURE: 96.6 F | BODY MASS INDEX: 34.69 KG/M2 | RESPIRATION RATE: 18 BRPM | DIASTOLIC BLOOD PRESSURE: 77 MMHG | SYSTOLIC BLOOD PRESSURE: 144 MMHG | OXYGEN SATURATION: 98 % | HEART RATE: 88 BPM

## 2022-01-01 VITALS
RESPIRATION RATE: 18 BRPM | BODY MASS INDEX: 32.07 KG/M2 | SYSTOLIC BLOOD PRESSURE: 132 MMHG | OXYGEN SATURATION: 96 % | WEIGHT: 187.83 LBS | DIASTOLIC BLOOD PRESSURE: 81 MMHG | HEART RATE: 81 BPM | HEIGHT: 64 IN | TEMPERATURE: 97.6 F

## 2022-01-01 VITALS
TEMPERATURE: 97.3 F | OXYGEN SATURATION: 95 % | HEART RATE: 99 BPM | HEIGHT: 64 IN | BODY MASS INDEX: 32.37 KG/M2 | WEIGHT: 189.6 LBS | DIASTOLIC BLOOD PRESSURE: 82 MMHG | SYSTOLIC BLOOD PRESSURE: 126 MMHG | RESPIRATION RATE: 18 BRPM

## 2022-01-01 VITALS
HEART RATE: 88 BPM | WEIGHT: 194 LBS | RESPIRATION RATE: 18 BRPM | TEMPERATURE: 98 F | HEIGHT: 64 IN | OXYGEN SATURATION: 95 % | BODY MASS INDEX: 33.12 KG/M2 | SYSTOLIC BLOOD PRESSURE: 141 MMHG | DIASTOLIC BLOOD PRESSURE: 79 MMHG

## 2022-01-01 VITALS
TEMPERATURE: 97.6 F | RESPIRATION RATE: 22 BRPM | BODY MASS INDEX: 31.44 KG/M2 | HEIGHT: 65 IN | WEIGHT: 188.71 LBS | SYSTOLIC BLOOD PRESSURE: 123 MMHG | OXYGEN SATURATION: 96 % | HEART RATE: 69 BPM | DIASTOLIC BLOOD PRESSURE: 65 MMHG

## 2022-01-01 VITALS
HEART RATE: 93 BPM | RESPIRATION RATE: 18 BRPM | HEIGHT: 64 IN | DIASTOLIC BLOOD PRESSURE: 98 MMHG | OXYGEN SATURATION: 95 % | WEIGHT: 186.29 LBS | TEMPERATURE: 97.7 F | SYSTOLIC BLOOD PRESSURE: 152 MMHG | BODY MASS INDEX: 31.8 KG/M2

## 2022-01-01 VITALS
SYSTOLIC BLOOD PRESSURE: 104 MMHG | TEMPERATURE: 97.9 F | BODY MASS INDEX: 32.18 KG/M2 | HEIGHT: 64 IN | DIASTOLIC BLOOD PRESSURE: 60 MMHG | RESPIRATION RATE: 14 BRPM | HEART RATE: 85 BPM | WEIGHT: 188.49 LBS | OXYGEN SATURATION: 94 %

## 2022-01-01 VITALS
BODY MASS INDEX: 31.24 KG/M2 | TEMPERATURE: 98.2 F | RESPIRATION RATE: 18 BRPM | DIASTOLIC BLOOD PRESSURE: 63 MMHG | OXYGEN SATURATION: 98 % | SYSTOLIC BLOOD PRESSURE: 105 MMHG | HEIGHT: 64 IN | HEART RATE: 74 BPM | WEIGHT: 182.98 LBS

## 2022-01-01 VITALS
HEIGHT: 64 IN | HEART RATE: 88 BPM | DIASTOLIC BLOOD PRESSURE: 89 MMHG | BODY MASS INDEX: 31.88 KG/M2 | TEMPERATURE: 98.1 F | RESPIRATION RATE: 18 BRPM | OXYGEN SATURATION: 95 % | SYSTOLIC BLOOD PRESSURE: 125 MMHG | WEIGHT: 186.73 LBS

## 2022-01-01 VITALS
DIASTOLIC BLOOD PRESSURE: 72 MMHG | OXYGEN SATURATION: 99 % | HEART RATE: 90 BPM | BODY MASS INDEX: 31.84 KG/M2 | HEIGHT: 63 IN | SYSTOLIC BLOOD PRESSURE: 136 MMHG | TEMPERATURE: 97.7 F | RESPIRATION RATE: 18 BRPM | WEIGHT: 179.68 LBS

## 2022-01-01 VITALS
TEMPERATURE: 97.6 F | WEIGHT: 182.54 LBS | SYSTOLIC BLOOD PRESSURE: 111 MMHG | BODY MASS INDEX: 31.16 KG/M2 | RESPIRATION RATE: 18 BRPM | DIASTOLIC BLOOD PRESSURE: 83 MMHG | HEART RATE: 84 BPM | HEIGHT: 64 IN | OXYGEN SATURATION: 100 %

## 2022-01-01 VITALS
WEIGHT: 192.9 LBS | DIASTOLIC BLOOD PRESSURE: 80 MMHG | RESPIRATION RATE: 16 BRPM | HEART RATE: 87 BPM | OXYGEN SATURATION: 81 % | BODY MASS INDEX: 32.93 KG/M2 | SYSTOLIC BLOOD PRESSURE: 142 MMHG | TEMPERATURE: 97.2 F | HEIGHT: 64 IN

## 2022-01-01 VITALS
SYSTOLIC BLOOD PRESSURE: 100 MMHG | BODY MASS INDEX: 25.7 KG/M2 | RESPIRATION RATE: 17 BRPM | TEMPERATURE: 96 F | DIASTOLIC BLOOD PRESSURE: 75 MMHG | OXYGEN SATURATION: 90 % | WEIGHT: 159.17 LBS | HEART RATE: 117 BPM

## 2022-01-01 VITALS
TEMPERATURE: 98.8 F | OXYGEN SATURATION: 97 % | TEMPERATURE: 96.4 F | SYSTOLIC BLOOD PRESSURE: 139 MMHG | HEART RATE: 81 BPM | SYSTOLIC BLOOD PRESSURE: 126 MMHG | WEIGHT: 193.89 LBS | HEIGHT: 63 IN | RESPIRATION RATE: 19 BRPM | DIASTOLIC BLOOD PRESSURE: 96 MMHG | HEIGHT: 64 IN | DIASTOLIC BLOOD PRESSURE: 97 MMHG | OXYGEN SATURATION: 94 % | RESPIRATION RATE: 19 BRPM | BODY MASS INDEX: 32.71 KG/M2 | WEIGHT: 184.63 LBS | HEART RATE: 104 BPM | BODY MASS INDEX: 33.1 KG/M2

## 2022-01-01 VITALS
RESPIRATION RATE: 18 BRPM | OXYGEN SATURATION: 97 % | DIASTOLIC BLOOD PRESSURE: 80 MMHG | HEART RATE: 92 BPM | BODY MASS INDEX: 32.71 KG/M2 | TEMPERATURE: 98 F | HEIGHT: 64 IN | WEIGHT: 191.58 LBS | SYSTOLIC BLOOD PRESSURE: 117 MMHG

## 2022-01-01 VITALS
HEART RATE: 92 BPM | TEMPERATURE: 98.6 F | DIASTOLIC BLOOD PRESSURE: 103 MMHG | HEIGHT: 63 IN | OXYGEN SATURATION: 98 % | RESPIRATION RATE: 18 BRPM | WEIGHT: 189.26 LBS | SYSTOLIC BLOOD PRESSURE: 168 MMHG | BODY MASS INDEX: 33.54 KG/M2

## 2022-01-01 VITALS
HEART RATE: 108 BPM | DIASTOLIC BLOOD PRESSURE: 52 MMHG | TEMPERATURE: 96.8 F | HEIGHT: 66 IN | RESPIRATION RATE: 18 BRPM | SYSTOLIC BLOOD PRESSURE: 92 MMHG | OXYGEN SATURATION: 94 % | WEIGHT: 140.43 LBS | BODY MASS INDEX: 22.57 KG/M2

## 2022-01-01 VITALS
RESPIRATION RATE: 19 BRPM | OXYGEN SATURATION: 96 % | BODY MASS INDEX: 32.44 KG/M2 | HEART RATE: 111 BPM | DIASTOLIC BLOOD PRESSURE: 86 MMHG | WEIGHT: 190.04 LBS | HEIGHT: 64 IN | TEMPERATURE: 96.4 F | SYSTOLIC BLOOD PRESSURE: 124 MMHG

## 2022-01-01 VITALS
RESPIRATION RATE: 18 BRPM | HEIGHT: 64 IN | WEIGHT: 182.1 LBS | OXYGEN SATURATION: 95 % | HEART RATE: 92 BPM | TEMPERATURE: 97.7 F | DIASTOLIC BLOOD PRESSURE: 78 MMHG | SYSTOLIC BLOOD PRESSURE: 126 MMHG | BODY MASS INDEX: 31.09 KG/M2

## 2022-01-01 VITALS
SYSTOLIC BLOOD PRESSURE: 121 MMHG | BODY MASS INDEX: 32.29 KG/M2 | RESPIRATION RATE: 18 BRPM | HEART RATE: 97 BPM | OXYGEN SATURATION: 97 % | WEIGHT: 189.15 LBS | TEMPERATURE: 97.2 F | DIASTOLIC BLOOD PRESSURE: 82 MMHG | HEIGHT: 64 IN

## 2022-01-01 VITALS
TEMPERATURE: 97.5 F | RESPIRATION RATE: 18 BRPM | HEIGHT: 64 IN | OXYGEN SATURATION: 98 % | SYSTOLIC BLOOD PRESSURE: 124 MMHG | DIASTOLIC BLOOD PRESSURE: 86 MMHG | HEART RATE: 85 BPM | BODY MASS INDEX: 31.2 KG/M2 | WEIGHT: 182.76 LBS

## 2022-01-01 VITALS — RESPIRATION RATE: 17 BRPM

## 2022-01-01 VITALS
RESPIRATION RATE: 18 BRPM | DIASTOLIC BLOOD PRESSURE: 100 MMHG | TEMPERATURE: 97.5 F | HEIGHT: 63 IN | OXYGEN SATURATION: 96 % | WEIGHT: 191.69 LBS | SYSTOLIC BLOOD PRESSURE: 146 MMHG | HEART RATE: 87 BPM | BODY MASS INDEX: 33.96 KG/M2

## 2022-01-01 VITALS
TEMPERATURE: 98.8 F | SYSTOLIC BLOOD PRESSURE: 99 MMHG | RESPIRATION RATE: 18 BRPM | WEIGHT: 186.29 LBS | BODY MASS INDEX: 31.8 KG/M2 | HEIGHT: 64 IN | DIASTOLIC BLOOD PRESSURE: 68 MMHG | HEART RATE: 101 BPM | OXYGEN SATURATION: 97 %

## 2022-01-01 VITALS
HEART RATE: 98 BPM | TEMPERATURE: 97.9 F | RESPIRATION RATE: 14 BRPM | HEIGHT: 64 IN | SYSTOLIC BLOOD PRESSURE: 142 MMHG | DIASTOLIC BLOOD PRESSURE: 90 MMHG | OXYGEN SATURATION: 93 % | WEIGHT: 186.62 LBS | BODY MASS INDEX: 31.86 KG/M2

## 2022-01-01 VITALS
OXYGEN SATURATION: 93 % | DIASTOLIC BLOOD PRESSURE: 73 MMHG | RESPIRATION RATE: 16 BRPM | TEMPERATURE: 98 F | SYSTOLIC BLOOD PRESSURE: 105 MMHG | HEART RATE: 90 BPM | BODY MASS INDEX: 33.01 KG/M2 | WEIGHT: 193.34 LBS | HEIGHT: 64 IN

## 2022-01-01 VITALS
BODY MASS INDEX: 33.42 KG/M2 | OXYGEN SATURATION: 94 % | DIASTOLIC BLOOD PRESSURE: 87 MMHG | RESPIRATION RATE: 19 BRPM | HEIGHT: 63 IN | TEMPERATURE: 97.4 F | HEART RATE: 87 BPM | WEIGHT: 188.6 LBS | SYSTOLIC BLOOD PRESSURE: 121 MMHG

## 2022-01-01 VITALS — RESPIRATION RATE: 16 BRPM

## 2022-01-01 VITALS
RESPIRATION RATE: 18 BRPM | WEIGHT: 187.83 LBS | BODY MASS INDEX: 32.07 KG/M2 | OXYGEN SATURATION: 96 % | SYSTOLIC BLOOD PRESSURE: 140 MMHG | TEMPERATURE: 97.5 F | DIASTOLIC BLOOD PRESSURE: 80 MMHG | HEIGHT: 64 IN | HEART RATE: 87 BPM

## 2022-01-01 VITALS — RESPIRATION RATE: 18 BRPM

## 2022-01-01 VITALS — HEART RATE: 90 BPM

## 2022-01-01 DIAGNOSIS — J95.811 PNEUMOTHORAX OF LEFT LUNG AFTER BIOPSY: ICD-10-CM

## 2022-01-01 DIAGNOSIS — C50.111 MALIGNANT NEOPLASM OF CENTRAL PORTION OF RIGHT BREAST IN FEMALE, ESTROGEN RECEPTOR NEGATIVE (HCC): ICD-10-CM

## 2022-01-01 DIAGNOSIS — C50.411 MALIGNANT NEOPLASM OF UPPER-OUTER QUADRANT OF RIGHT FEMALE BREAST, UNSPECIFIED ESTROGEN RECEPTOR STATUS (HCC): ICD-10-CM

## 2022-01-01 DIAGNOSIS — R60.9 EDEMA, UNSPECIFIED TYPE: ICD-10-CM

## 2022-01-01 DIAGNOSIS — Z17.1 MALIGNANT NEOPLASM OF CENTRAL PORTION OF BREAST IN FEMALE, ESTROGEN RECEPTOR NEGATIVE, UNSPECIFIED LATERALITY (HCC): ICD-10-CM

## 2022-01-01 DIAGNOSIS — C78.00 CARCINOMA OF RIGHT BREAST METASTATIC TO LUNG (HCC): ICD-10-CM

## 2022-01-01 DIAGNOSIS — C50.911 CARCINOMA OF RIGHT BREAST METASTATIC TO LUNG (HCC): ICD-10-CM

## 2022-01-01 DIAGNOSIS — C78.00 CARCINOMA OF RIGHT BREAST METASTATIC TO LUNG (HCC): Primary | ICD-10-CM

## 2022-01-01 DIAGNOSIS — K52.1 CHEMOTHERAPY INDUCED DIARRHEA: ICD-10-CM

## 2022-01-01 DIAGNOSIS — Z17.1 MALIGNANT NEOPLASM OF CENTRAL PORTION OF RIGHT BREAST IN FEMALE, ESTROGEN RECEPTOR NEGATIVE (HCC): ICD-10-CM

## 2022-01-01 DIAGNOSIS — R11.2 NON-INTRACTABLE VOMITING WITH NAUSEA, UNSPECIFIED VOMITING TYPE: ICD-10-CM

## 2022-01-01 DIAGNOSIS — K59.1 FUNCTIONAL DIARRHEA: ICD-10-CM

## 2022-01-01 DIAGNOSIS — C50.111 MALIGNANT NEOPLASM OF CENTRAL PORTION OF RIGHT BREAST IN FEMALE, ESTROGEN RECEPTOR NEGATIVE (HCC): Primary | ICD-10-CM

## 2022-01-01 DIAGNOSIS — C50.911 CARCINOMA OF RIGHT BREAST METASTATIC TO LUNG (HCC): Primary | ICD-10-CM

## 2022-01-01 DIAGNOSIS — M79.89 LEG SWELLING: ICD-10-CM

## 2022-01-01 DIAGNOSIS — B37.2 YEAST INFECTION OF THE SKIN: ICD-10-CM

## 2022-01-01 DIAGNOSIS — C50.119 MALIGNANT NEOPLASM OF CENTRAL PORTION OF BREAST IN FEMALE, ESTROGEN RECEPTOR NEGATIVE, UNSPECIFIED LATERALITY (HCC): ICD-10-CM

## 2022-01-01 DIAGNOSIS — J02.9 SORE THROAT: ICD-10-CM

## 2022-01-01 DIAGNOSIS — I95.9 HYPOTENSION, UNSPECIFIED HYPOTENSION TYPE: ICD-10-CM

## 2022-01-01 DIAGNOSIS — M54.2 ANTERIOR NECK PAIN: ICD-10-CM

## 2022-01-01 DIAGNOSIS — T14.8XXA BLEEDING FROM WOUND: ICD-10-CM

## 2022-01-01 DIAGNOSIS — D13.5 BENIGN NEOPLASM OF LIVER AND BILIARY PASSAGES: ICD-10-CM

## 2022-01-01 DIAGNOSIS — C50.919 METASTATIC BREAST CANCER: ICD-10-CM

## 2022-01-01 DIAGNOSIS — R56.9 SEIZURE (HCC): ICD-10-CM

## 2022-01-01 DIAGNOSIS — R63.0 ANOREXIA: ICD-10-CM

## 2022-01-01 DIAGNOSIS — I10 HYPERTENSION, UNSPECIFIED TYPE: ICD-10-CM

## 2022-01-01 DIAGNOSIS — R11.0 CHEMOTHERAPY-INDUCED NAUSEA: ICD-10-CM

## 2022-01-01 DIAGNOSIS — R05.9 COUGH: ICD-10-CM

## 2022-01-01 DIAGNOSIS — Z79.899 NEED FOR PROPHYLACTIC CHEMOTHERAPY: ICD-10-CM

## 2022-01-01 DIAGNOSIS — R41.89 UNRESPONSIVE STATE: ICD-10-CM

## 2022-01-01 DIAGNOSIS — R07.81 PLEURITIC CHEST PAIN: ICD-10-CM

## 2022-01-01 DIAGNOSIS — J95.811 PNEUMOTHORAX OF LEFT LUNG AFTER BIOPSY: Primary | ICD-10-CM

## 2022-01-01 DIAGNOSIS — T50.904A OVERDOSE OF UNDETERMINED INTENT, INITIAL ENCOUNTER: ICD-10-CM

## 2022-01-01 DIAGNOSIS — K92.1 MELENA: ICD-10-CM

## 2022-01-01 DIAGNOSIS — R19.7 DIARRHEA, UNSPECIFIED TYPE: ICD-10-CM

## 2022-01-01 DIAGNOSIS — I10 PRIMARY HYPERTENSION: ICD-10-CM

## 2022-01-01 DIAGNOSIS — T45.1X5A CHEMOTHERAPY INDUCED DIARRHEA: ICD-10-CM

## 2022-01-01 DIAGNOSIS — G89.18 POST-OP PAIN: Primary | ICD-10-CM

## 2022-01-01 DIAGNOSIS — T45.1X5A CHEMOTHERAPY-INDUCED NAUSEA: ICD-10-CM

## 2022-01-01 DIAGNOSIS — Z17.1 MALIGNANT NEOPLASM OF CENTRAL PORTION OF RIGHT BREAST IN FEMALE, ESTROGEN RECEPTOR NEGATIVE (HCC): Primary | ICD-10-CM

## 2022-01-01 DIAGNOSIS — D64.9 ANEMIA, UNSPECIFIED TYPE: ICD-10-CM

## 2022-01-01 DIAGNOSIS — C50.211 MALIGNANT NEOPLASM OF UPPER-INNER QUADRANT OF RIGHT BREAST IN FEMALE, ESTROGEN RECEPTOR NEGATIVE (HCC): ICD-10-CM

## 2022-01-01 DIAGNOSIS — R11.2 NAUSEA VOMITING AND DIARRHEA: ICD-10-CM

## 2022-01-01 DIAGNOSIS — R19.7 NAUSEA VOMITING AND DIARRHEA: ICD-10-CM

## 2022-01-01 DIAGNOSIS — D13.4 BENIGN NEOPLASM OF LIVER AND BILIARY PASSAGES: ICD-10-CM

## 2022-01-01 DIAGNOSIS — R53.1 GENERALIZED WEAKNESS: ICD-10-CM

## 2022-01-01 DIAGNOSIS — R79.89 ELEVATED LACTIC ACID LEVEL: ICD-10-CM

## 2022-01-01 DIAGNOSIS — G89.29 OTHER CHRONIC PAIN: ICD-10-CM

## 2022-01-01 DIAGNOSIS — Z17.1 MALIGNANT NEOPLASM OF UPPER-INNER QUADRANT OF RIGHT BREAST IN FEMALE, ESTROGEN RECEPTOR NEGATIVE (HCC): ICD-10-CM

## 2022-01-01 DIAGNOSIS — K28.4 GASTROINTESTINAL HEMORRHAGE ASSOCIATED WITH GASTROJEJUNAL ULCER: ICD-10-CM

## 2022-01-01 DIAGNOSIS — D50.9 IRON DEFICIENCY ANEMIA, UNSPECIFIED IRON DEFICIENCY ANEMIA TYPE: ICD-10-CM

## 2022-01-01 DIAGNOSIS — R06.02 SOB (SHORTNESS OF BREATH): ICD-10-CM

## 2022-01-01 DIAGNOSIS — N39.0 URINARY TRACT INFECTION WITHOUT HEMATURIA, SITE UNSPECIFIED: ICD-10-CM

## 2022-01-01 LAB
ABO GROUP BLD: NORMAL
AFP-TM SERPL-MCNC: 3 NG/ML (ref 0–9)
ALBUMIN SERPL BCP-MCNC: 2.5 G/DL (ref 3.2–4.9)
ALBUMIN SERPL BCP-MCNC: 2.7 G/DL (ref 3.2–4.9)
ALBUMIN SERPL BCP-MCNC: 3 G/DL (ref 3.2–4.9)
ALBUMIN SERPL BCP-MCNC: 3 G/DL (ref 3.2–4.9)
ALBUMIN SERPL BCP-MCNC: 3.2 G/DL (ref 3.2–4.9)
ALBUMIN SERPL BCP-MCNC: 3.2 G/DL (ref 3.2–4.9)
ALBUMIN SERPL BCP-MCNC: 3.3 G/DL (ref 3.2–4.9)
ALBUMIN SERPL BCP-MCNC: 3.3 G/DL (ref 3.2–4.9)
ALBUMIN SERPL BCP-MCNC: 3.4 G/DL (ref 3.2–4.9)
ALBUMIN SERPL BCP-MCNC: 3.4 G/DL (ref 3.2–4.9)
ALBUMIN SERPL BCP-MCNC: 3.5 G/DL (ref 3.2–4.9)
ALBUMIN SERPL BCP-MCNC: 3.6 G/DL (ref 3.2–4.9)
ALBUMIN SERPL BCP-MCNC: 3.8 G/DL (ref 3.2–4.9)
ALBUMIN SERPL BCP-MCNC: 3.8 G/DL (ref 3.2–4.9)
ALBUMIN SERPL BCP-MCNC: 3.9 G/DL (ref 3.2–4.9)
ALBUMIN SERPL BCP-MCNC: 3.9 G/DL (ref 3.2–4.9)
ALBUMIN SERPL BCP-MCNC: 4 G/DL (ref 3.2–4.9)
ALBUMIN SERPL BCP-MCNC: 4 G/DL (ref 3.2–4.9)
ALBUMIN SERPL BCP-MCNC: 4.1 G/DL (ref 3.2–4.9)
ALBUMIN SERPL BCP-MCNC: 4.3 G/DL (ref 3.2–4.9)
ALBUMIN SERPL BCP-MCNC: 4.3 G/DL (ref 3.2–4.9)
ALBUMIN SERPL BCP-MCNC: 4.4 G/DL (ref 3.2–4.9)
ALBUMIN SERPL BCP-MCNC: 4.4 G/DL (ref 3.2–4.9)
ALBUMIN/GLOB SERPL: 1.1 G/DL
ALBUMIN/GLOB SERPL: 1.1 G/DL
ALBUMIN/GLOB SERPL: 1.2 G/DL
ALBUMIN/GLOB SERPL: 1.3 G/DL
ALBUMIN/GLOB SERPL: 1.4 G/DL
ALBUMIN/GLOB SERPL: 1.5 G/DL
ALBUMIN/GLOB SERPL: 1.6 G/DL
ALBUMIN/GLOB SERPL: 1.7 G/DL
ALBUMIN/GLOB SERPL: 1.9 G/DL
ALP BONE SERPL-CCNC: 72 U/L (ref 0–55)
ALP ISOS SERPL HS-CCNC: 0 U/L
ALP LIVER SERPL-CCNC: 123 U/L (ref 0–94)
ALP SERPL-CCNC: 128 U/L (ref 30–99)
ALP SERPL-CCNC: 130 U/L (ref 30–99)
ALP SERPL-CCNC: 132 U/L (ref 30–99)
ALP SERPL-CCNC: 135 U/L (ref 30–99)
ALP SERPL-CCNC: 140 U/L (ref 30–99)
ALP SERPL-CCNC: 140 U/L (ref 30–99)
ALP SERPL-CCNC: 141 U/L (ref 30–99)
ALP SERPL-CCNC: 143 U/L (ref 30–99)
ALP SERPL-CCNC: 146 U/L (ref 30–99)
ALP SERPL-CCNC: 147 U/L (ref 30–99)
ALP SERPL-CCNC: 156 U/L (ref 30–99)
ALP SERPL-CCNC: 158 U/L (ref 30–99)
ALP SERPL-CCNC: 166 U/L (ref 30–99)
ALP SERPL-CCNC: 170 U/L (ref 30–99)
ALP SERPL-CCNC: 195 U/L (ref 40–120)
ALP SERPL-CCNC: 197 U/L (ref 30–99)
ALP SERPL-CCNC: 238 U/L (ref 30–99)
ALP SERPL-CCNC: 265 U/L (ref 30–99)
ALP SERPL-CCNC: 266 U/L (ref 30–99)
ALP SERPL-CCNC: 286 U/L (ref 30–99)
ALP SERPL-CCNC: 294 U/L (ref 30–99)
ALP SERPL-CCNC: 295 U/L (ref 30–99)
ALP SERPL-CCNC: 316 U/L (ref 30–99)
ALP SERPL-CCNC: 346 U/L (ref 30–99)
ALP SERPL-CCNC: 351 U/L (ref 30–99)
ALT SERPL-CCNC: 102 U/L (ref 2–50)
ALT SERPL-CCNC: 11 U/L (ref 2–50)
ALT SERPL-CCNC: 13 U/L (ref 2–50)
ALT SERPL-CCNC: 13 U/L (ref 2–50)
ALT SERPL-CCNC: 14 U/L (ref 2–50)
ALT SERPL-CCNC: 14 U/L (ref 2–50)
ALT SERPL-CCNC: 15 U/L (ref 2–50)
ALT SERPL-CCNC: 16 U/L (ref 2–50)
ALT SERPL-CCNC: 16 U/L (ref 2–50)
ALT SERPL-CCNC: 18 U/L (ref 2–50)
ALT SERPL-CCNC: 18 U/L (ref 2–50)
ALT SERPL-CCNC: 19 U/L (ref 2–50)
ALT SERPL-CCNC: 198 U/L (ref 2–50)
ALT SERPL-CCNC: 252 U/L (ref 2–50)
ALT SERPL-CCNC: 26 U/L (ref 2–50)
ALT SERPL-CCNC: 274 U/L (ref 2–50)
ALT SERPL-CCNC: 40 U/L (ref 2–50)
ALT SERPL-CCNC: 522 U/L (ref 2–50)
ALT SERPL-CCNC: 623 U/L (ref 2–50)
ALT SERPL-CCNC: 735 U/L (ref 2–50)
ALT SERPL-CCNC: 75 U/L (ref 2–50)
ALT SERPL-CCNC: 82 U/L (ref 2–50)
ALT SERPL-CCNC: 9 U/L (ref 2–50)
ALT SERPL-CCNC: 9 U/L (ref 2–50)
AMMONIA PLAS-SCNC: 20 UMOL/L (ref 11–45)
AMPHET UR QL SCN: NEGATIVE
ANION GAP SERPL CALC-SCNC: 10 MMOL/L (ref 7–16)
ANION GAP SERPL CALC-SCNC: 11 MMOL/L (ref 7–16)
ANION GAP SERPL CALC-SCNC: 12 MMOL/L (ref 7–16)
ANION GAP SERPL CALC-SCNC: 13 MMOL/L (ref 7–16)
ANION GAP SERPL CALC-SCNC: 14 MMOL/L (ref 7–16)
ANION GAP SERPL CALC-SCNC: 15 MMOL/L (ref 7–16)
ANION GAP SERPL CALC-SCNC: 16 MMOL/L (ref 7–16)
ANION GAP SERPL CALC-SCNC: 17 MMOL/L (ref 7–16)
ANION GAP SERPL CALC-SCNC: 17 MMOL/L (ref 7–16)
ANION GAP SERPL CALC-SCNC: 18 MMOL/L (ref 7–16)
ANION GAP SERPL CALC-SCNC: 19 MMOL/L (ref 7–16)
ANION GAP SERPL CALC-SCNC: 20 MMOL/L (ref 7–16)
ANION GAP SERPL CALC-SCNC: 20 MMOL/L (ref 7–16)
ANION GAP SERPL CALC-SCNC: 23 MMOL/L (ref 7–16)
ANION GAP SERPL CALC-SCNC: 23 MMOL/L (ref 7–16)
ANION GAP SERPL CALC-SCNC: 27 MMOL/L (ref 7–16)
ANION GAP SERPL CALC-SCNC: 8 MMOL/L (ref 7–16)
ANION GAP SERPL CALC-SCNC: 8 MMOL/L (ref 7–16)
ANION GAP SERPL CALC-SCNC: 9 MMOL/L (ref 7–16)
ANION GAP SERPL CALC-SCNC: 9 MMOL/L (ref 7–16)
ANISOCYTOSIS BLD QL SMEAR: ABNORMAL
APAP SERPL-MCNC: <5 UG/ML (ref 10–30)
APPEARANCE FLD: NORMAL
APPEARANCE UR: ABNORMAL
APPEARANCE UR: CLEAR
APTT PPP: 23.2 SEC (ref 24.7–36)
APTT PPP: 26.9 SEC (ref 24.7–36)
APTT PPP: <20 SEC (ref 24.7–36)
AST SERPL-CCNC: 107 U/L (ref 12–45)
AST SERPL-CCNC: 134 U/L (ref 12–45)
AST SERPL-CCNC: 193 U/L (ref 12–45)
AST SERPL-CCNC: 20 U/L (ref 12–45)
AST SERPL-CCNC: 20 U/L (ref 12–45)
AST SERPL-CCNC: 21 U/L (ref 12–45)
AST SERPL-CCNC: 21 U/L (ref 12–45)
AST SERPL-CCNC: 22 U/L (ref 12–45)
AST SERPL-CCNC: 22 U/L (ref 12–45)
AST SERPL-CCNC: 23 U/L (ref 12–45)
AST SERPL-CCNC: 24 U/L (ref 12–45)
AST SERPL-CCNC: 25 U/L (ref 12–45)
AST SERPL-CCNC: 25 U/L (ref 12–45)
AST SERPL-CCNC: 26 U/L (ref 12–45)
AST SERPL-CCNC: 29 U/L (ref 12–45)
AST SERPL-CCNC: 30 U/L (ref 12–45)
AST SERPL-CCNC: 362 U/L (ref 12–45)
AST SERPL-CCNC: 41 U/L (ref 12–45)
AST SERPL-CCNC: 44 U/L (ref 12–45)
AST SERPL-CCNC: 443 U/L (ref 12–45)
AST SERPL-CCNC: 45 U/L (ref 12–45)
AST SERPL-CCNC: 53 U/L (ref 12–45)
AST SERPL-CCNC: 69 U/L (ref 12–45)
AST SERPL-CCNC: 88 U/L (ref 12–45)
BACTERIA #/AREA URNS HPF: ABNORMAL /HPF
BACTERIA #/AREA URNS HPF: NEGATIVE /HPF
BACTERIA #/AREA URNS HPF: NEGATIVE /HPF
BACTERIA BLD CULT: NORMAL
BACTERIA SPEC RESP CULT: ABNORMAL
BACTERIA SPEC RESP CULT: ABNORMAL
BACTERIA STL CULT: NORMAL
BACTERIA STL CULT: NORMAL
BACTERIA UR CULT: ABNORMAL
BACTERIA UR CULT: ABNORMAL
BACTERIA UR CULT: NORMAL
BACTERIA UR CULT: NORMAL
BARBITURATES UR QL SCN: NEGATIVE
BARCODED ABORH UBTYP: 5100
BARCODED ABORH UBTYP: 5100
BARCODED PRD CODE UBPRD: NORMAL
BARCODED PRD CODE UBPRD: NORMAL
BARCODED UNIT NUM UBUNT: NORMAL
BARCODED UNIT NUM UBUNT: NORMAL
BASE EXCESS BLDA CALC-SCNC: -10 MMOL/L (ref -4–3)
BASE EXCESS BLDA CALC-SCNC: -22 MMOL/L (ref -4–3)
BASE EXCESS BLDA CALC-SCNC: -4 MMOL/L (ref -4–3)
BASE EXCESS BLDA CALC-SCNC: -5 MMOL/L (ref -4–3)
BASE EXCESS BLDV CALC-SCNC: -3 MMOL/L (ref -4–3)
BASE EXCESS BLDV CALC-SCNC: -6 MMOL/L (ref -4–3)
BASOPHILS # BLD AUTO: 0 % (ref 0–1.8)
BASOPHILS # BLD AUTO: 0.1 % (ref 0–1.8)
BASOPHILS # BLD AUTO: 0.2 % (ref 0–1.8)
BASOPHILS # BLD AUTO: 0.2 % (ref 0–1.8)
BASOPHILS # BLD AUTO: 0.3 % (ref 0–1.8)
BASOPHILS # BLD AUTO: 0.4 % (ref 0–1.8)
BASOPHILS # BLD AUTO: 0.4 % (ref 0–1.8)
BASOPHILS # BLD AUTO: 0.5 % (ref 0–1.8)
BASOPHILS # BLD AUTO: 0.6 % (ref 0–1.8)
BASOPHILS # BLD AUTO: 0.7 % (ref 0–1.8)
BASOPHILS # BLD AUTO: 0.8 % (ref 0–1.8)
BASOPHILS # BLD AUTO: 0.9 % (ref 0–1.8)
BASOPHILS # BLD AUTO: 1.1 % (ref 0–1.8)
BASOPHILS # BLD AUTO: 1.2 % (ref 0–1.8)
BASOPHILS # BLD AUTO: 1.3 % (ref 0–1.8)
BASOPHILS # BLD AUTO: 1.3 % (ref 0–1.8)
BASOPHILS # BLD AUTO: 1.5 % (ref 0–1.8)
BASOPHILS # BLD AUTO: 2.6 % (ref 0–1.8)
BASOPHILS # BLD: 0 K/UL (ref 0–0.12)
BASOPHILS # BLD: 0.02 K/UL (ref 0–0.12)
BASOPHILS # BLD: 0.03 K/UL (ref 0–0.12)
BASOPHILS # BLD: 0.04 K/UL (ref 0–0.12)
BASOPHILS # BLD: 0.05 K/UL (ref 0–0.12)
BASOPHILS # BLD: 0.06 K/UL (ref 0–0.12)
BASOPHILS # BLD: 0.06 K/UL (ref 0–0.12)
BASOPHILS # BLD: 0.07 K/UL (ref 0–0.12)
BASOPHILS # BLD: 0.1 K/UL (ref 0–0.12)
BASOPHILS # BLD: 0.12 K/UL (ref 0–0.12)
BENZODIAZ UR QL SCN: NEGATIVE
BILIRUB CONJ SERPL-MCNC: 0.5 MG/DL (ref 0.1–0.5)
BILIRUB INDIRECT SERPL-MCNC: 0.4 MG/DL (ref 0–1)
BILIRUB SERPL-MCNC: 0.2 MG/DL (ref 0.1–1.5)
BILIRUB SERPL-MCNC: 0.3 MG/DL (ref 0.1–1.5)
BILIRUB SERPL-MCNC: 0.4 MG/DL (ref 0.1–1.5)
BILIRUB SERPL-MCNC: 0.5 MG/DL (ref 0.1–1.5)
BILIRUB SERPL-MCNC: 0.7 MG/DL (ref 0.1–1.5)
BILIRUB SERPL-MCNC: 0.9 MG/DL (ref 0.1–1.5)
BILIRUB SERPL-MCNC: <0.2 MG/DL (ref 0.1–1.5)
BILIRUB UR QL STRIP.AUTO: ABNORMAL
BILIRUB UR QL STRIP.AUTO: NEGATIVE
BLD GP AB SCN SERPL QL: NORMAL
BODY FLD TYPE: NORMAL
BODY TEMPERATURE: ABNORMAL DEGREES
BREATHS SETTING VENT: 16
BREATHS SETTING VENT: 20
BREATHS SETTING VENT: 24
BREATHS SETTING VENT: 24
BUN SERPL-MCNC: 10 MG/DL (ref 8–22)
BUN SERPL-MCNC: 10 MG/DL (ref 8–22)
BUN SERPL-MCNC: 100 MG/DL (ref 8–22)
BUN SERPL-MCNC: 108 MG/DL (ref 8–22)
BUN SERPL-MCNC: 112 MG/DL (ref 8–22)
BUN SERPL-MCNC: 118 MG/DL (ref 8–22)
BUN SERPL-MCNC: 12 MG/DL (ref 8–22)
BUN SERPL-MCNC: 12 MG/DL (ref 8–22)
BUN SERPL-MCNC: 121 MG/DL (ref 8–22)
BUN SERPL-MCNC: 122 MG/DL (ref 8–22)
BUN SERPL-MCNC: 123 MG/DL (ref 8–22)
BUN SERPL-MCNC: 126 MG/DL (ref 8–22)
BUN SERPL-MCNC: 127 MG/DL (ref 8–22)
BUN SERPL-MCNC: 129 MG/DL (ref 8–22)
BUN SERPL-MCNC: 13 MG/DL (ref 8–22)
BUN SERPL-MCNC: 134 MG/DL (ref 8–22)
BUN SERPL-MCNC: 14 MG/DL (ref 8–22)
BUN SERPL-MCNC: 14 MG/DL (ref 8–22)
BUN SERPL-MCNC: 15 MG/DL (ref 8–22)
BUN SERPL-MCNC: 154 MG/DL (ref 8–22)
BUN SERPL-MCNC: 16 MG/DL (ref 8–22)
BUN SERPL-MCNC: 16 MG/DL (ref 8–22)
BUN SERPL-MCNC: 17 MG/DL (ref 8–22)
BUN SERPL-MCNC: 20 MG/DL (ref 8–22)
BUN SERPL-MCNC: 20 MG/DL (ref 8–22)
BUN SERPL-MCNC: 22 MG/DL (ref 8–22)
BUN SERPL-MCNC: 22 MG/DL (ref 8–22)
BUN SERPL-MCNC: 25 MG/DL (ref 8–22)
BUN SERPL-MCNC: 27 MG/DL (ref 8–22)
BUN SERPL-MCNC: 29 MG/DL (ref 8–22)
BUN SERPL-MCNC: 32 MG/DL (ref 8–22)
BUN SERPL-MCNC: 32 MG/DL (ref 8–22)
BUN SERPL-MCNC: 34 MG/DL (ref 8–22)
BUN SERPL-MCNC: 38 MG/DL (ref 8–22)
BUN SERPL-MCNC: 44 MG/DL (ref 8–22)
BUN SERPL-MCNC: 45 MG/DL (ref 8–22)
BUN SERPL-MCNC: 47 MG/DL (ref 8–22)
BUN SERPL-MCNC: 48 MG/DL (ref 8–22)
BUN SERPL-MCNC: 50 MG/DL (ref 8–22)
BUN SERPL-MCNC: 52 MG/DL (ref 8–22)
BUN SERPL-MCNC: 52 MG/DL (ref 8–22)
BUN SERPL-MCNC: 54 MG/DL (ref 8–22)
BUN SERPL-MCNC: 60 MG/DL (ref 8–22)
BUN SERPL-MCNC: 63 MG/DL (ref 8–22)
BUN SERPL-MCNC: 69 MG/DL (ref 8–22)
BUN SERPL-MCNC: 73 MG/DL (ref 8–22)
BUN SERPL-MCNC: 8 MG/DL (ref 8–22)
BUN SERPL-MCNC: 81 MG/DL (ref 8–22)
BUN SERPL-MCNC: 81 MG/DL (ref 8–22)
BUN SERPL-MCNC: 86 MG/DL (ref 8–22)
BUN SERPL-MCNC: 9 MG/DL (ref 8–22)
BZE UR QL SCN: NEGATIVE
C DIFF DNA SPEC QL NAA+PROBE: NEGATIVE
C DIFF DNA SPEC QL NAA+PROBE: NORMAL
C DIFF TOX A+B STL QL IA: NEGATIVE
C DIFF TOX GENS STL QL NAA+PROBE: NEGATIVE
C DIFF TOX GENS STL QL NAA+PROBE: NORMAL
C JEJUNI+C COLI AG STL QL: NORMAL
C JEJUNI+C COLI AG STL QL: NORMAL
CA-I BLD ISE-SCNC: 1.23 MMOL/L (ref 1.1–1.3)
CALCIUM SERPL-MCNC: 10.8 MG/DL (ref 8.5–10.5)
CALCIUM SERPL-MCNC: 7.8 MG/DL (ref 8.5–10.5)
CALCIUM SERPL-MCNC: 7.8 MG/DL (ref 8.5–10.5)
CALCIUM SERPL-MCNC: 8 MG/DL (ref 8.5–10.5)
CALCIUM SERPL-MCNC: 8.2 MG/DL (ref 8.5–10.5)
CALCIUM SERPL-MCNC: 8.2 MG/DL (ref 8.5–10.5)
CALCIUM SERPL-MCNC: 8.4 MG/DL (ref 8.4–10.2)
CALCIUM SERPL-MCNC: 8.4 MG/DL (ref 8.5–10.5)
CALCIUM SERPL-MCNC: 8.6 MG/DL (ref 8.5–10.5)
CALCIUM SERPL-MCNC: 8.7 MG/DL (ref 8.5–10.5)
CALCIUM SERPL-MCNC: 8.8 MG/DL (ref 8.5–10.5)
CALCIUM SERPL-MCNC: 8.8 MG/DL (ref 8.5–10.5)
CALCIUM SERPL-MCNC: 8.9 MG/DL (ref 8.5–10.5)
CALCIUM SERPL-MCNC: 9 MG/DL (ref 8.5–10.5)
CALCIUM SERPL-MCNC: 9.1 MG/DL (ref 8.4–10.2)
CALCIUM SERPL-MCNC: 9.1 MG/DL (ref 8.4–10.2)
CALCIUM SERPL-MCNC: 9.1 MG/DL (ref 8.5–10.5)
CALCIUM SERPL-MCNC: 9.1 MG/DL (ref 8.5–10.5)
CALCIUM SERPL-MCNC: 9.2 MG/DL (ref 8.4–10.2)
CALCIUM SERPL-MCNC: 9.2 MG/DL (ref 8.5–10.5)
CALCIUM SERPL-MCNC: 9.3 MG/DL (ref 8.4–10.2)
CALCIUM SERPL-MCNC: 9.3 MG/DL (ref 8.5–10.5)
CALCIUM SERPL-MCNC: 9.4 MG/DL (ref 8.5–10.5)
CALCIUM SERPL-MCNC: 9.4 MG/DL (ref 8.5–10.5)
CALCIUM SERPL-MCNC: 9.5 MG/DL (ref 8.4–10.2)
CALCIUM SERPL-MCNC: 9.5 MG/DL (ref 8.5–10.5)
CALCIUM SERPL-MCNC: 9.6 MG/DL (ref 8.5–10.5)
CALCIUM SERPL-MCNC: 9.7 MG/DL (ref 8.5–10.5)
CALCIUM SERPL-MCNC: 9.7 MG/DL (ref 8.5–10.5)
CALCIUM SERPL-MCNC: 9.8 MG/DL (ref 8.4–10.2)
CALCIUM SERPL-MCNC: 9.8 MG/DL (ref 8.5–10.5)
CALCIUM SERPL-MCNC: 9.9 MG/DL (ref 8.4–10.2)
CALCIUM SERPL-MCNC: 9.9 MG/DL (ref 8.5–10.5)
CALCIUM SERPL-MCNC: 9.9 MG/DL (ref 8.5–10.5)
CALPROTECTIN STL-MCNT: 89 UG/G
CANCER AG27-29 SERPL-ACNC: 48.6 U/ML
CANCER AG27-29 SERPL-ACNC: 49.8 U/ML
CANNABINOIDS UR QL SCN: NEGATIVE
CEA SERPL-MCNC: 2.1 NG/ML (ref 0–3)
CFT BLD TEG: 3.2 MIN (ref 4.6–9.1)
CFT P HPASE BLD TEG: 3.2 MIN (ref 4.3–8.3)
CHLORIDE SERPL-SCNC: 100 MMOL/L (ref 96–112)
CHLORIDE SERPL-SCNC: 101 MMOL/L (ref 96–112)
CHLORIDE SERPL-SCNC: 102 MMOL/L (ref 96–112)
CHLORIDE SERPL-SCNC: 103 MMOL/L (ref 96–112)
CHLORIDE SERPL-SCNC: 104 MMOL/L (ref 96–112)
CHLORIDE SERPL-SCNC: 105 MMOL/L (ref 96–112)
CHLORIDE SERPL-SCNC: 106 MMOL/L (ref 96–112)
CHLORIDE SERPL-SCNC: 107 MMOL/L (ref 96–112)
CHLORIDE SERPL-SCNC: 108 MMOL/L (ref 96–112)
CHLORIDE SERPL-SCNC: 109 MMOL/L (ref 96–112)
CHLORIDE SERPL-SCNC: 110 MMOL/L (ref 96–112)
CHLORIDE SERPL-SCNC: 110 MMOL/L (ref 96–112)
CHLORIDE SERPL-SCNC: 111 MMOL/L (ref 96–112)
CHLORIDE SERPL-SCNC: 112 MMOL/L (ref 96–112)
CHLORIDE SERPL-SCNC: 113 MMOL/L (ref 96–112)
CHLORIDE SERPL-SCNC: 115 MMOL/L (ref 96–112)
CHLORIDE SERPL-SCNC: 115 MMOL/L (ref 96–112)
CHLORIDE SERPL-SCNC: 117 MMOL/L (ref 96–112)
CHLORIDE SERPL-SCNC: 118 MMOL/L (ref 96–112)
CHLORIDE SERPL-SCNC: 122 MMOL/L (ref 96–112)
CHLORIDE SERPL-SCNC: 89 MMOL/L (ref 96–112)
CHLORIDE SERPL-SCNC: 99 MMOL/L (ref 96–112)
CLOT ANGLE BLD TEG: 71.9 DEGREES (ref 63–78)
CLOT LYSIS 30M P MA LENFR BLD TEG: 0 % (ref 0–2.6)
CO2 BLDA-SCNC: 10 MMOL/L (ref 20–33)
CO2 BLDA-SCNC: 16 MMOL/L (ref 20–33)
CO2 BLDA-SCNC: 19 MMOL/L (ref 20–33)
CO2 BLDA-SCNC: 19 MMOL/L (ref 20–33)
CO2 BLDV-SCNC: 17 MMOL/L (ref 20–33)
CO2 BLDV-SCNC: 21 MMOL/L (ref 20–33)
CO2 SERPL-SCNC: 12 MMOL/L (ref 20–33)
CO2 SERPL-SCNC: 12 MMOL/L (ref 20–33)
CO2 SERPL-SCNC: 13 MMOL/L (ref 20–33)
CO2 SERPL-SCNC: 14 MMOL/L (ref 20–33)
CO2 SERPL-SCNC: 15 MMOL/L (ref 20–33)
CO2 SERPL-SCNC: 16 MMOL/L (ref 20–33)
CO2 SERPL-SCNC: 17 MMOL/L (ref 20–33)
CO2 SERPL-SCNC: 18 MMOL/L (ref 20–33)
CO2 SERPL-SCNC: 19 MMOL/L (ref 20–33)
CO2 SERPL-SCNC: 20 MMOL/L (ref 20–33)
CO2 SERPL-SCNC: 21 MMOL/L (ref 20–33)
CO2 SERPL-SCNC: 21 MMOL/L (ref 20–33)
CO2 SERPL-SCNC: 22 MMOL/L (ref 20–33)
CO2 SERPL-SCNC: 23 MMOL/L (ref 20–33)
CO2 SERPL-SCNC: 24 MMOL/L (ref 20–33)
CO2 SERPL-SCNC: 25 MMOL/L (ref 20–33)
CO2 SERPL-SCNC: 26 MMOL/L (ref 20–33)
CO2 SERPL-SCNC: 27 MMOL/L (ref 20–33)
CO2 SERPL-SCNC: 27 MMOL/L (ref 20–33)
CO2 SERPL-SCNC: 7 MMOL/L (ref 20–33)
COLOR FLD: NORMAL
COLOR UR: ABNORMAL
COLOR UR: ABNORMAL
COLOR UR: YELLOW
COMMENT 1642: NORMAL
COMMENT 1642: NORMAL
COMPONENT R 8504R: NORMAL
COMPONENT R 8504R: NORMAL
CORTIS SERPL-MCNC: 10.9 UG/DL (ref 0–23)
CREAT BLD-MCNC: 0.7 MG/DL (ref 0.5–1.4)
CREAT SERPL-MCNC: 0.43 MG/DL (ref 0.5–1.4)
CREAT SERPL-MCNC: 0.48 MG/DL (ref 0.5–1.4)
CREAT SERPL-MCNC: 0.52 MG/DL (ref 0.5–1.4)
CREAT SERPL-MCNC: 0.54 MG/DL (ref 0.5–1.4)
CREAT SERPL-MCNC: 0.56 MG/DL (ref 0.5–1.4)
CREAT SERPL-MCNC: 0.57 MG/DL (ref 0.5–1.4)
CREAT SERPL-MCNC: 0.57 MG/DL (ref 0.5–1.4)
CREAT SERPL-MCNC: 0.59 MG/DL (ref 0.5–1.4)
CREAT SERPL-MCNC: 0.6 MG/DL (ref 0.5–1.4)
CREAT SERPL-MCNC: 0.6 MG/DL (ref 0.5–1.4)
CREAT SERPL-MCNC: 0.62 MG/DL (ref 0.5–1.4)
CREAT SERPL-MCNC: 0.63 MG/DL (ref 0.5–1.4)
CREAT SERPL-MCNC: 0.63 MG/DL (ref 0.5–1.4)
CREAT SERPL-MCNC: 0.64 MG/DL (ref 0.5–1.4)
CREAT SERPL-MCNC: 0.65 MG/DL (ref 0.5–1.4)
CREAT SERPL-MCNC: 0.66 MG/DL (ref 0.5–1.4)
CREAT SERPL-MCNC: 0.67 MG/DL (ref 0.5–1.4)
CREAT SERPL-MCNC: 0.68 MG/DL (ref 0.5–1.4)
CREAT SERPL-MCNC: 0.7 MG/DL (ref 0.5–1.4)
CREAT SERPL-MCNC: 0.7 MG/DL (ref 0.5–1.4)
CREAT SERPL-MCNC: 0.72 MG/DL (ref 0.5–1.4)
CREAT SERPL-MCNC: 0.75 MG/DL (ref 0.5–1.4)
CREAT SERPL-MCNC: 0.76 MG/DL (ref 0.5–1.4)
CREAT SERPL-MCNC: 0.76 MG/DL (ref 0.5–1.4)
CREAT SERPL-MCNC: 0.78 MG/DL (ref 0.5–1.4)
CREAT SERPL-MCNC: 0.83 MG/DL (ref 0.5–1.4)
CREAT SERPL-MCNC: 0.83 MG/DL (ref 0.5–1.4)
CREAT SERPL-MCNC: 0.84 MG/DL (ref 0.5–1.4)
CREAT SERPL-MCNC: 0.85 MG/DL (ref 0.5–1.4)
CREAT SERPL-MCNC: 0.91 MG/DL (ref 0.5–1.4)
CREAT SERPL-MCNC: 0.92 MG/DL (ref 0.5–1.4)
CREAT SERPL-MCNC: 0.94 MG/DL (ref 0.5–1.4)
CREAT SERPL-MCNC: 1.11 MG/DL (ref 0.5–1.4)
CREAT SERPL-MCNC: 1.23 MG/DL (ref 0.5–1.4)
CREAT SERPL-MCNC: 1.33 MG/DL (ref 0.5–1.4)
CREAT SERPL-MCNC: 1.45 MG/DL (ref 0.5–1.4)
CREAT SERPL-MCNC: 1.67 MG/DL (ref 0.5–1.4)
CREAT SERPL-MCNC: 1.69 MG/DL (ref 0.5–1.4)
CREAT SERPL-MCNC: 1.7 MG/DL (ref 0.5–1.4)
CREAT SERPL-MCNC: 1.74 MG/DL (ref 0.5–1.4)
CREAT SERPL-MCNC: 1.79 MG/DL (ref 0.5–1.4)
CREAT SERPL-MCNC: 1.99 MG/DL (ref 0.5–1.4)
CREAT SERPL-MCNC: 2.12 MG/DL (ref 0.5–1.4)
CREAT SERPL-MCNC: 2.25 MG/DL (ref 0.5–1.4)
CREAT SERPL-MCNC: 2.36 MG/DL (ref 0.5–1.4)
CREAT SERPL-MCNC: 2.38 MG/DL (ref 0.5–1.4)
CREAT SERPL-MCNC: 2.4 MG/DL (ref 0.5–1.4)
CREAT SERPL-MCNC: 2.5 MG/DL (ref 0.5–1.4)
CREAT SERPL-MCNC: 2.62 MG/DL (ref 0.5–1.4)
CREAT SERPL-MCNC: 2.69 MG/DL (ref 0.5–1.4)
CREAT SERPL-MCNC: 3.39 MG/DL (ref 0.5–1.4)
CREAT SERPL-MCNC: 3.72 MG/DL (ref 0.5–1.4)
CREAT SERPL-MCNC: 4.29 MG/DL (ref 0.5–1.4)
CREAT SERPL-MCNC: 4.88 MG/DL (ref 0.5–1.4)
CREAT SERPL-MCNC: 4.93 MG/DL (ref 0.5–1.4)
CREAT SERPL-MCNC: 5.02 MG/DL (ref 0.5–1.4)
CREAT SERPL-MCNC: 5.24 MG/DL (ref 0.5–1.4)
CREAT SERPL-MCNC: 5.31 MG/DL (ref 0.5–1.4)
CREAT SERPL-MCNC: 5.47 MG/DL (ref 0.5–1.4)
CREAT SERPL-MCNC: 5.51 MG/DL (ref 0.5–1.4)
CREAT SERPL-MCNC: 5.57 MG/DL (ref 0.5–1.4)
CREAT SERPL-MCNC: 7.31 MG/DL (ref 0.5–1.4)
CRP SERPL HS-MCNC: 2.39 MG/DL (ref 0–0.75)
CRP SERPL HS-MCNC: 3.36 MG/DL (ref 0–0.75)
CRP SERPL HS-MCNC: 4.01 MG/DL (ref 0–0.75)
CT.EXTRINSIC BLD ROTEM: 1.5 MIN (ref 0.8–2.1)
DELSYS IDSYS: ABNORMAL
E COLI SXT1+2 STL IA: NORMAL
EKG IMPRESSION: NORMAL
ELASTASE PANC STL-MCNT: 607 UG/G
END TIDAL CARBON DIOXIDE IECO2: 28 MMHG
EOSINOPHIL # BLD AUTO: 0 K/UL (ref 0–0.51)
EOSINOPHIL # BLD AUTO: 0.02 K/UL (ref 0–0.51)
EOSINOPHIL # BLD AUTO: 0.03 K/UL (ref 0–0.51)
EOSINOPHIL # BLD AUTO: 0.03 K/UL (ref 0–0.51)
EOSINOPHIL # BLD AUTO: 0.05 K/UL (ref 0–0.51)
EOSINOPHIL # BLD AUTO: 0.06 K/UL (ref 0–0.51)
EOSINOPHIL # BLD AUTO: 0.07 K/UL (ref 0–0.51)
EOSINOPHIL # BLD AUTO: 0.09 K/UL (ref 0–0.51)
EOSINOPHIL # BLD AUTO: 0.1 K/UL (ref 0–0.51)
EOSINOPHIL # BLD AUTO: 0.1 K/UL (ref 0–0.51)
EOSINOPHIL # BLD AUTO: 0.11 K/UL (ref 0–0.51)
EOSINOPHIL # BLD AUTO: 0.12 K/UL (ref 0–0.51)
EOSINOPHIL # BLD AUTO: 0.33 K/UL (ref 0–0.51)
EOSINOPHIL NFR BLD: 0 % (ref 0–6.9)
EOSINOPHIL NFR BLD: 0.3 % (ref 0–6.9)
EOSINOPHIL NFR BLD: 0.3 % (ref 0–6.9)
EOSINOPHIL NFR BLD: 0.4 % (ref 0–6.9)
EOSINOPHIL NFR BLD: 0.4 % (ref 0–6.9)
EOSINOPHIL NFR BLD: 0.5 % (ref 0–6.9)
EOSINOPHIL NFR BLD: 0.9 % (ref 0–6.9)
EOSINOPHIL NFR BLD: 1.1 % (ref 0–6.9)
EOSINOPHIL NFR BLD: 1.1 % (ref 0–6.9)
EOSINOPHIL NFR BLD: 1.3 % (ref 0–6.9)
EOSINOPHIL NFR BLD: 1.4 % (ref 0–6.9)
EOSINOPHIL NFR BLD: 1.4 % (ref 0–6.9)
EOSINOPHIL NFR BLD: 1.5 % (ref 0–6.9)
EOSINOPHIL NFR BLD: 1.7 % (ref 0–6.9)
EOSINOPHIL NFR BLD: 1.9 % (ref 0–6.9)
EOSINOPHIL NFR BLD: 2 % (ref 0–6.9)
EOSINOPHIL NFR BLD: 2.2 % (ref 0–6.9)
EOSINOPHIL NFR BLD: 2.5 % (ref 0–6.9)
EOSINOPHIL NFR BLD: 3.3 % (ref 0–6.9)
EOSINOPHIL NFR BLD: 3.7 % (ref 0–6.9)
EOSINOPHIL NFR FLD: 1 %
EPI CELLS #/AREA URNS HPF: ABNORMAL /HPF
EPI CELLS #/AREA URNS HPF: ABNORMAL /HPF
EPI CELLS #/AREA URNS HPF: NEGATIVE /HPF
EPI CELLS #/AREA URNS HPF: NEGATIVE /HPF
ERYTHROCYTE [DISTWIDTH] IN BLOOD BY AUTOMATED COUNT: 41.7 FL (ref 35.9–50)
ERYTHROCYTE [DISTWIDTH] IN BLOOD BY AUTOMATED COUNT: 44.4 FL (ref 35.9–50)
ERYTHROCYTE [DISTWIDTH] IN BLOOD BY AUTOMATED COUNT: 44.9 FL (ref 35.9–50)
ERYTHROCYTE [DISTWIDTH] IN BLOOD BY AUTOMATED COUNT: 45.2 FL (ref 35.9–50)
ERYTHROCYTE [DISTWIDTH] IN BLOOD BY AUTOMATED COUNT: 46 FL (ref 35.9–50)
ERYTHROCYTE [DISTWIDTH] IN BLOOD BY AUTOMATED COUNT: 46 FL (ref 35.9–50)
ERYTHROCYTE [DISTWIDTH] IN BLOOD BY AUTOMATED COUNT: 46.1 FL (ref 35.9–50)
ERYTHROCYTE [DISTWIDTH] IN BLOOD BY AUTOMATED COUNT: 46.4 FL (ref 35.9–50)
ERYTHROCYTE [DISTWIDTH] IN BLOOD BY AUTOMATED COUNT: 46.6 FL (ref 35.9–50)
ERYTHROCYTE [DISTWIDTH] IN BLOOD BY AUTOMATED COUNT: 47 FL (ref 35.9–50)
ERYTHROCYTE [DISTWIDTH] IN BLOOD BY AUTOMATED COUNT: 49 FL (ref 35.9–50)
ERYTHROCYTE [DISTWIDTH] IN BLOOD BY AUTOMATED COUNT: 50.1 FL (ref 35.9–50)
ERYTHROCYTE [DISTWIDTH] IN BLOOD BY AUTOMATED COUNT: 50.4 FL (ref 35.9–50)
ERYTHROCYTE [DISTWIDTH] IN BLOOD BY AUTOMATED COUNT: 50.4 FL (ref 35.9–50)
ERYTHROCYTE [DISTWIDTH] IN BLOOD BY AUTOMATED COUNT: 50.6 FL (ref 35.9–50)
ERYTHROCYTE [DISTWIDTH] IN BLOOD BY AUTOMATED COUNT: 51.1 FL (ref 35.9–50)
ERYTHROCYTE [DISTWIDTH] IN BLOOD BY AUTOMATED COUNT: 51.7 FL (ref 35.9–50)
ERYTHROCYTE [DISTWIDTH] IN BLOOD BY AUTOMATED COUNT: 51.8 FL (ref 35.9–50)
ERYTHROCYTE [DISTWIDTH] IN BLOOD BY AUTOMATED COUNT: 52.3 FL (ref 35.9–50)
ERYTHROCYTE [DISTWIDTH] IN BLOOD BY AUTOMATED COUNT: 52.7 FL (ref 35.9–50)
ERYTHROCYTE [DISTWIDTH] IN BLOOD BY AUTOMATED COUNT: 53.3 FL (ref 35.9–50)
ERYTHROCYTE [DISTWIDTH] IN BLOOD BY AUTOMATED COUNT: 53.6 FL (ref 35.9–50)
ERYTHROCYTE [DISTWIDTH] IN BLOOD BY AUTOMATED COUNT: 53.7 FL (ref 35.9–50)
ERYTHROCYTE [DISTWIDTH] IN BLOOD BY AUTOMATED COUNT: 53.9 FL (ref 35.9–50)
ERYTHROCYTE [DISTWIDTH] IN BLOOD BY AUTOMATED COUNT: 54.5 FL (ref 35.9–50)
ERYTHROCYTE [DISTWIDTH] IN BLOOD BY AUTOMATED COUNT: 54.8 FL (ref 35.9–50)
ERYTHROCYTE [DISTWIDTH] IN BLOOD BY AUTOMATED COUNT: 55.2 FL (ref 35.9–50)
ERYTHROCYTE [DISTWIDTH] IN BLOOD BY AUTOMATED COUNT: 55.7 FL (ref 35.9–50)
ERYTHROCYTE [DISTWIDTH] IN BLOOD BY AUTOMATED COUNT: 55.8 FL (ref 35.9–50)
ERYTHROCYTE [DISTWIDTH] IN BLOOD BY AUTOMATED COUNT: 56.6 FL (ref 35.9–50)
ERYTHROCYTE [DISTWIDTH] IN BLOOD BY AUTOMATED COUNT: 56.8 FL (ref 35.9–50)
ERYTHROCYTE [DISTWIDTH] IN BLOOD BY AUTOMATED COUNT: 58.1 FL (ref 35.9–50)
ERYTHROCYTE [DISTWIDTH] IN BLOOD BY AUTOMATED COUNT: 60.9 FL (ref 35.9–50)
ERYTHROCYTE [DISTWIDTH] IN BLOOD BY AUTOMATED COUNT: 61.1 FL (ref 35.9–50)
ERYTHROCYTE [DISTWIDTH] IN BLOOD BY AUTOMATED COUNT: 61.3 FL (ref 35.9–50)
ERYTHROCYTE [DISTWIDTH] IN BLOOD BY AUTOMATED COUNT: 62.2 FL (ref 35.9–50)
ERYTHROCYTE [DISTWIDTH] IN BLOOD BY AUTOMATED COUNT: 62.9 FL (ref 35.9–50)
ERYTHROCYTE [DISTWIDTH] IN BLOOD BY AUTOMATED COUNT: 65.4 FL (ref 35.9–50)
ERYTHROCYTE [DISTWIDTH] IN BLOOD BY AUTOMATED COUNT: 65.5 FL (ref 35.9–50)
ERYTHROCYTE [DISTWIDTH] IN BLOOD BY AUTOMATED COUNT: 67.6 FL (ref 35.9–50)
ERYTHROCYTE [DISTWIDTH] IN BLOOD BY AUTOMATED COUNT: 67.7 FL (ref 35.9–50)
ERYTHROCYTE [DISTWIDTH] IN BLOOD BY AUTOMATED COUNT: 70.4 FL (ref 35.9–50)
ERYTHROCYTE [DISTWIDTH] IN BLOOD BY AUTOMATED COUNT: 73.7 FL (ref 35.9–50)
ERYTHROCYTE [DISTWIDTH] IN BLOOD BY AUTOMATED COUNT: 76.3 FL (ref 35.9–50)
ERYTHROCYTE [DISTWIDTH] IN BLOOD BY AUTOMATED COUNT: 80.5 FL (ref 35.9–50)
ERYTHROCYTE [SEDIMENTATION RATE] IN BLOOD BY WESTERGREN METHOD: 1 MM/HOUR (ref 0–25)
ETHANOL BLD-MCNC: <10.1 MG/DL
ETHANOL BLD-MCNC: <10.1 MG/DL
EXPOSED MRN EXMRN: ABNORMAL
EXPOSED MRN EXMRN: NORMAL
FLUAV RNA SPEC QL NAA+PROBE: NEGATIVE
FLUBV RNA SPEC QL NAA+PROBE: NEGATIVE
FUNGUS SPEC CULT: NORMAL
FUNGUS SPEC FUNGUS STN: NORMAL
FUNGUS SPEC FUNGUS STN: NORMAL
GFR SERPLBLD CREATININE-BSD FMLA CKD-EPI: 10 ML/MIN/1.73 M 2
GFR SERPLBLD CREATININE-BSD FMLA CKD-EPI: 10 ML/MIN/1.73 M 2
GFR SERPLBLD CREATININE-BSD FMLA CKD-EPI: 100 ML/MIN/1.73 M 2
GFR SERPLBLD CREATININE-BSD FMLA CKD-EPI: 100 ML/MIN/1.73 M 2
GFR SERPLBLD CREATININE-BSD FMLA CKD-EPI: 101 ML/MIN/1.73 M 2
GFR SERPLBLD CREATININE-BSD FMLA CKD-EPI: 102 ML/MIN/1.73 M 2
GFR SERPLBLD CREATININE-BSD FMLA CKD-EPI: 103 ML/MIN/1.73 M 2
GFR SERPLBLD CREATININE-BSD FMLA CKD-EPI: 103 ML/MIN/1.73 M 2
GFR SERPLBLD CREATININE-BSD FMLA CKD-EPI: 104 ML/MIN/1.73 M 2
GFR SERPLBLD CREATININE-BSD FMLA CKD-EPI: 104 ML/MIN/1.73 M 2
GFR SERPLBLD CREATININE-BSD FMLA CKD-EPI: 105 ML/MIN/1.73 M 2
GFR SERPLBLD CREATININE-BSD FMLA CKD-EPI: 105 ML/MIN/1.73 M 2
GFR SERPLBLD CREATININE-BSD FMLA CKD-EPI: 108 ML/MIN/1.73 M 2
GFR SERPLBLD CREATININE-BSD FMLA CKD-EPI: 11 ML/MIN/1.73 M 2
GFR SERPLBLD CREATININE-BSD FMLA CKD-EPI: 111 ML/MIN/1.73 M 2
GFR SERPLBLD CREATININE-BSD FMLA CKD-EPI: 13 ML/MIN/1.73 M 2
GFR SERPLBLD CREATININE-BSD FMLA CKD-EPI: 15 ML/MIN/1.73 M 2
GFR SERPLBLD CREATININE-BSD FMLA CKD-EPI: 20 ML/MIN/1.73 M 2
GFR SERPLBLD CREATININE-BSD FMLA CKD-EPI: 20 ML/MIN/1.73 M 2
GFR SERPLBLD CREATININE-BSD FMLA CKD-EPI: 21 ML/MIN/1.73 M 2
GFR SERPLBLD CREATININE-BSD FMLA CKD-EPI: 23 ML/MIN/1.73 M 2
GFR SERPLBLD CREATININE-BSD FMLA CKD-EPI: 24 ML/MIN/1.73 M 2
GFR SERPLBLD CREATININE-BSD FMLA CKD-EPI: 26 ML/MIN/1.73 M 2
GFR SERPLBLD CREATININE-BSD FMLA CKD-EPI: 28 ML/MIN/1.73 M 2
GFR SERPLBLD CREATININE-BSD FMLA CKD-EPI: 32 ML/MIN/1.73 M 2
GFR SERPLBLD CREATININE-BSD FMLA CKD-EPI: 33 ML/MIN/1.73 M 2
GFR SERPLBLD CREATININE-BSD FMLA CKD-EPI: 34 ML/MIN/1.73 M 2
GFR SERPLBLD CREATININE-BSD FMLA CKD-EPI: 34 ML/MIN/1.73 M 2
GFR SERPLBLD CREATININE-BSD FMLA CKD-EPI: 35 ML/MIN/1.73 M 2
GFR SERPLBLD CREATININE-BSD FMLA CKD-EPI: 41 ML/MIN/1.73 M 2
GFR SERPLBLD CREATININE-BSD FMLA CKD-EPI: 46 ML/MIN/1.73 M 2
GFR SERPLBLD CREATININE-BSD FMLA CKD-EPI: 50 ML/MIN/1.73 M 2
GFR SERPLBLD CREATININE-BSD FMLA CKD-EPI: 57 ML/MIN/1.73 M 2
GFR SERPLBLD CREATININE-BSD FMLA CKD-EPI: 6 ML/MIN/1.73 M 2
GFR SERPLBLD CREATININE-BSD FMLA CKD-EPI: 69 ML/MIN/1.73 M 2
GFR SERPLBLD CREATININE-BSD FMLA CKD-EPI: 71 ML/MIN/1.73 M 2
GFR SERPLBLD CREATININE-BSD FMLA CKD-EPI: 72 ML/MIN/1.73 M 2
GFR SERPLBLD CREATININE-BSD FMLA CKD-EPI: 78 ML/MIN/1.73 M 2
GFR SERPLBLD CREATININE-BSD FMLA CKD-EPI: 8 ML/MIN/1.73 M 2
GFR SERPLBLD CREATININE-BSD FMLA CKD-EPI: 80 ML/MIN/1.73 M 2
GFR SERPLBLD CREATININE-BSD FMLA CKD-EPI: 81 ML/MIN/1.73 M 2
GFR SERPLBLD CREATININE-BSD FMLA CKD-EPI: 81 ML/MIN/1.73 M 2
GFR SERPLBLD CREATININE-BSD FMLA CKD-EPI: 87 ML/MIN/1.73 M 2
GFR SERPLBLD CREATININE-BSD FMLA CKD-EPI: 9 ML/MIN/1.73 M 2
GFR SERPLBLD CREATININE-BSD FMLA CKD-EPI: 90 ML/MIN/1.73 M 2
GFR SERPLBLD CREATININE-BSD FMLA CKD-EPI: 90 ML/MIN/1.73 M 2
GFR SERPLBLD CREATININE-BSD FMLA CKD-EPI: 91 ML/MIN/1.73 M 2
GFR SERPLBLD CREATININE-BSD FMLA CKD-EPI: 96 ML/MIN/1.73 M 2
GFR SERPLBLD CREATININE-BSD FMLA CKD-EPI: 99 ML/MIN/1.73 M 2
GGT SERPL-CCNC: 31 U/L (ref 7–34)
GLOBULIN SER CALC-MCNC: 1.6 G/DL (ref 1.9–3.5)
GLOBULIN SER CALC-MCNC: 1.7 G/DL (ref 1.9–3.5)
GLOBULIN SER CALC-MCNC: 2 G/DL (ref 1.9–3.5)
GLOBULIN SER CALC-MCNC: 2 G/DL (ref 1.9–3.5)
GLOBULIN SER CALC-MCNC: 2.4 G/DL (ref 1.9–3.5)
GLOBULIN SER CALC-MCNC: 2.4 G/DL (ref 1.9–3.5)
GLOBULIN SER CALC-MCNC: 2.5 G/DL (ref 1.9–3.5)
GLOBULIN SER CALC-MCNC: 2.5 G/DL (ref 1.9–3.5)
GLOBULIN SER CALC-MCNC: 2.7 G/DL (ref 1.9–3.5)
GLOBULIN SER CALC-MCNC: 2.8 G/DL (ref 1.9–3.5)
GLOBULIN SER CALC-MCNC: 2.9 G/DL (ref 1.9–3.5)
GLOBULIN SER CALC-MCNC: 3 G/DL (ref 1.9–3.5)
GLOBULIN SER CALC-MCNC: 3.1 G/DL (ref 1.9–3.5)
GLOBULIN SER CALC-MCNC: 3.2 G/DL (ref 1.9–3.5)
GLOBULIN SER CALC-MCNC: 3.3 G/DL (ref 1.9–3.5)
GLOBULIN SER CALC-MCNC: 4 G/DL (ref 1.9–3.5)
GLUCOSE BLD STRIP.AUTO-MCNC: 110 MG/DL (ref 65–99)
GLUCOSE BLD STRIP.AUTO-MCNC: 115 MG/DL (ref 65–99)
GLUCOSE BLD STRIP.AUTO-MCNC: 118 MG/DL (ref 65–99)
GLUCOSE BLD STRIP.AUTO-MCNC: 121 MG/DL (ref 65–99)
GLUCOSE BLD STRIP.AUTO-MCNC: 122 MG/DL (ref 65–99)
GLUCOSE BLD STRIP.AUTO-MCNC: 127 MG/DL (ref 65–99)
GLUCOSE BLD STRIP.AUTO-MCNC: 136 MG/DL (ref 65–99)
GLUCOSE BLD STRIP.AUTO-MCNC: 141 MG/DL (ref 65–99)
GLUCOSE BLD STRIP.AUTO-MCNC: 170 MG/DL (ref 65–99)
GLUCOSE BLD STRIP.AUTO-MCNC: 180 MG/DL (ref 65–99)
GLUCOSE BLD STRIP.AUTO-MCNC: 186 MG/DL (ref 65–99)
GLUCOSE BLD STRIP.AUTO-MCNC: 230 MG/DL (ref 65–99)
GLUCOSE SERPL-MCNC: 105 MG/DL (ref 65–99)
GLUCOSE SERPL-MCNC: 107 MG/DL (ref 65–99)
GLUCOSE SERPL-MCNC: 108 MG/DL (ref 65–99)
GLUCOSE SERPL-MCNC: 110 MG/DL (ref 65–99)
GLUCOSE SERPL-MCNC: 112 MG/DL (ref 65–99)
GLUCOSE SERPL-MCNC: 116 MG/DL (ref 65–99)
GLUCOSE SERPL-MCNC: 116 MG/DL (ref 65–99)
GLUCOSE SERPL-MCNC: 117 MG/DL (ref 65–99)
GLUCOSE SERPL-MCNC: 119 MG/DL (ref 65–99)
GLUCOSE SERPL-MCNC: 120 MG/DL (ref 65–99)
GLUCOSE SERPL-MCNC: 122 MG/DL (ref 65–99)
GLUCOSE SERPL-MCNC: 124 MG/DL (ref 65–99)
GLUCOSE SERPL-MCNC: 125 MG/DL (ref 65–99)
GLUCOSE SERPL-MCNC: 126 MG/DL (ref 65–99)
GLUCOSE SERPL-MCNC: 128 MG/DL (ref 65–99)
GLUCOSE SERPL-MCNC: 131 MG/DL (ref 65–99)
GLUCOSE SERPL-MCNC: 137 MG/DL (ref 65–99)
GLUCOSE SERPL-MCNC: 138 MG/DL (ref 65–99)
GLUCOSE SERPL-MCNC: 139 MG/DL (ref 65–99)
GLUCOSE SERPL-MCNC: 139 MG/DL (ref 65–99)
GLUCOSE SERPL-MCNC: 141 MG/DL (ref 65–99)
GLUCOSE SERPL-MCNC: 142 MG/DL (ref 65–99)
GLUCOSE SERPL-MCNC: 143 MG/DL (ref 65–99)
GLUCOSE SERPL-MCNC: 144 MG/DL (ref 65–99)
GLUCOSE SERPL-MCNC: 151 MG/DL (ref 65–99)
GLUCOSE SERPL-MCNC: 156 MG/DL (ref 65–99)
GLUCOSE SERPL-MCNC: 156 MG/DL (ref 65–99)
GLUCOSE SERPL-MCNC: 177 MG/DL (ref 65–99)
GLUCOSE SERPL-MCNC: 180 MG/DL (ref 65–99)
GLUCOSE SERPL-MCNC: 181 MG/DL (ref 65–99)
GLUCOSE SERPL-MCNC: 184 MG/DL (ref 65–99)
GLUCOSE SERPL-MCNC: 188 MG/DL (ref 65–99)
GLUCOSE SERPL-MCNC: 193 MG/DL (ref 65–99)
GLUCOSE SERPL-MCNC: 195 MG/DL (ref 65–99)
GLUCOSE SERPL-MCNC: 200 MG/DL (ref 65–99)
GLUCOSE SERPL-MCNC: 202 MG/DL (ref 65–99)
GLUCOSE SERPL-MCNC: 212 MG/DL (ref 65–99)
GLUCOSE SERPL-MCNC: 220 MG/DL (ref 65–99)
GLUCOSE SERPL-MCNC: 273 MG/DL (ref 65–99)
GLUCOSE SERPL-MCNC: 274 MG/DL (ref 65–99)
GLUCOSE SERPL-MCNC: 285 MG/DL (ref 65–99)
GLUCOSE SERPL-MCNC: 323 MG/DL (ref 65–99)
GLUCOSE SERPL-MCNC: 65 MG/DL (ref 65–99)
GLUCOSE SERPL-MCNC: 70 MG/DL (ref 65–99)
GLUCOSE SERPL-MCNC: 80 MG/DL (ref 65–99)
GLUCOSE SERPL-MCNC: 81 MG/DL (ref 65–99)
GLUCOSE SERPL-MCNC: 81 MG/DL (ref 65–99)
GLUCOSE SERPL-MCNC: 83 MG/DL (ref 65–99)
GLUCOSE SERPL-MCNC: 84 MG/DL (ref 65–99)
GLUCOSE SERPL-MCNC: 85 MG/DL (ref 65–99)
GLUCOSE SERPL-MCNC: 87 MG/DL (ref 65–99)
GLUCOSE SERPL-MCNC: 88 MG/DL (ref 65–99)
GLUCOSE SERPL-MCNC: 89 MG/DL (ref 65–99)
GLUCOSE SERPL-MCNC: 90 MG/DL (ref 65–99)
GLUCOSE SERPL-MCNC: 90 MG/DL (ref 65–99)
GLUCOSE SERPL-MCNC: 91 MG/DL (ref 65–99)
GLUCOSE SERPL-MCNC: 92 MG/DL (ref 65–99)
GLUCOSE SERPL-MCNC: 93 MG/DL (ref 65–99)
GLUCOSE SERPL-MCNC: 95 MG/DL (ref 65–99)
GLUCOSE UR STRIP.AUTO-MCNC: NEGATIVE MG/DL
GRAM STN SPEC: ABNORMAL
GRAM STN SPEC: NORMAL
GRAN CASTS #/AREA URNS LPF: ABNORMAL /LPF
GRAN CASTS #/AREA URNS LPF: ABNORMAL /LPF
HAPTOGLOB SERPL-MCNC: 232 MG/DL (ref 30–200)
HBV SURFACE AG SER QL: ABNORMAL
HCO3 BLDA-SCNC: 14.8 MMOL/L (ref 17–25)
HCO3 BLDA-SCNC: 17.9 MMOL/L (ref 17–25)
HCO3 BLDA-SCNC: 18 MMOL/L (ref 17–25)
HCO3 BLDA-SCNC: 8.6 MMOL/L (ref 17–25)
HCO3 BLDV-SCNC: 16.6 MMOL/L (ref 24–28)
HCO3 BLDV-SCNC: 20 MMOL/L (ref 24–28)
HCT VFR BLD AUTO: 18 % (ref 37–47)
HCT VFR BLD AUTO: 22.8 % (ref 37–47)
HCT VFR BLD AUTO: 24.7 % (ref 37–47)
HCT VFR BLD AUTO: 25.9 % (ref 37–47)
HCT VFR BLD AUTO: 26.7 % (ref 37–47)
HCT VFR BLD AUTO: 27.1 % (ref 37–47)
HCT VFR BLD AUTO: 27.5 % (ref 37–47)
HCT VFR BLD AUTO: 28 % (ref 37–47)
HCT VFR BLD AUTO: 28.6 % (ref 37–47)
HCT VFR BLD AUTO: 29.6 % (ref 37–47)
HCT VFR BLD AUTO: 29.6 % (ref 37–47)
HCT VFR BLD AUTO: 29.8 % (ref 37–47)
HCT VFR BLD AUTO: 29.8 % (ref 37–47)
HCT VFR BLD AUTO: 30.2 % (ref 37–47)
HCT VFR BLD AUTO: 30.4 % (ref 37–47)
HCT VFR BLD AUTO: 30.9 % (ref 37–47)
HCT VFR BLD AUTO: 31.1 % (ref 37–47)
HCT VFR BLD AUTO: 31.6 % (ref 37–47)
HCT VFR BLD AUTO: 31.7 % (ref 37–47)
HCT VFR BLD AUTO: 31.9 % (ref 37–47)
HCT VFR BLD AUTO: 32 % (ref 37–47)
HCT VFR BLD AUTO: 32.6 % (ref 37–47)
HCT VFR BLD AUTO: 33.2 % (ref 37–47)
HCT VFR BLD AUTO: 33.2 % (ref 37–47)
HCT VFR BLD AUTO: 33.4 % (ref 37–47)
HCT VFR BLD AUTO: 33.4 % (ref 37–47)
HCT VFR BLD AUTO: 33.6 % (ref 37–47)
HCT VFR BLD AUTO: 35 % (ref 37–47)
HCT VFR BLD AUTO: 35.9 % (ref 37–47)
HCT VFR BLD AUTO: 36 % (ref 37–47)
HCT VFR BLD AUTO: 36.8 % (ref 37–47)
HCT VFR BLD AUTO: 36.9 % (ref 37–47)
HCT VFR BLD AUTO: 37 % (ref 37–47)
HCT VFR BLD AUTO: 37 % (ref 37–47)
HCT VFR BLD AUTO: 37.3 % (ref 37–47)
HCT VFR BLD AUTO: 37.7 % (ref 37–47)
HCT VFR BLD AUTO: 37.9 % (ref 37–47)
HCT VFR BLD AUTO: 39.1 % (ref 37–47)
HCT VFR BLD AUTO: 39.9 % (ref 37–47)
HCT VFR BLD AUTO: 40 % (ref 37–47)
HCT VFR BLD AUTO: 40.7 % (ref 37–47)
HCT VFR BLD AUTO: 40.9 % (ref 37–47)
HCT VFR BLD AUTO: 41 % (ref 37–47)
HCT VFR BLD AUTO: 41.2 % (ref 37–47)
HCT VFR BLD AUTO: 41.7 % (ref 37–47)
HCT VFR BLD AUTO: 41.8 % (ref 37–47)
HCT VFR BLD AUTO: 41.9 % (ref 37–47)
HCT VFR BLD AUTO: 41.9 % (ref 37–47)
HCT VFR BLD AUTO: 49.9 % (ref 37–47)
HCV AB SER QL: REACTIVE
HCV RNA SERPL NAA+PROBE-ACNC: NOT DETECTED IU/ML
HCV RNA SERPL NAA+PROBE-ACNC: NOT DETECTED IU/ML
HCV RNA SERPL NAA+PROBE-LOG IU: NOT DETECTED LOG IU/ML
HCV RNA SERPL NAA+PROBE-LOG IU: NOT DETECTED LOG IU/ML
HCV RNA SERPL QL NAA+PROBE: NOT DETECTED
HCV RNA SERPL QL NAA+PROBE: NOT DETECTED
HEMOCCULT STL QL: NEGATIVE
HEMOCCULT STL QL: POSITIVE
HGB BLD-MCNC: 10.2 G/DL (ref 12–16)
HGB BLD-MCNC: 10.3 G/DL (ref 12–16)
HGB BLD-MCNC: 10.4 G/DL (ref 12–16)
HGB BLD-MCNC: 10.5 G/DL (ref 12–16)
HGB BLD-MCNC: 10.6 G/DL (ref 12–16)
HGB BLD-MCNC: 10.6 G/DL (ref 12–16)
HGB BLD-MCNC: 10.7 G/DL (ref 12–16)
HGB BLD-MCNC: 10.8 G/DL (ref 12–16)
HGB BLD-MCNC: 10.9 G/DL (ref 12–16)
HGB BLD-MCNC: 10.9 G/DL (ref 12–16)
HGB BLD-MCNC: 11.2 G/DL (ref 12–16)
HGB BLD-MCNC: 11.3 G/DL (ref 12–16)
HGB BLD-MCNC: 11.6 G/DL (ref 12–16)
HGB BLD-MCNC: 11.6 G/DL (ref 12–16)
HGB BLD-MCNC: 11.7 G/DL (ref 12–16)
HGB BLD-MCNC: 11.9 G/DL (ref 12–16)
HGB BLD-MCNC: 12.4 G/DL (ref 12–16)
HGB BLD-MCNC: 12.4 G/DL (ref 12–16)
HGB BLD-MCNC: 12.6 G/DL (ref 12–16)
HGB BLD-MCNC: 12.7 G/DL (ref 12–16)
HGB BLD-MCNC: 12.8 G/DL (ref 12–16)
HGB BLD-MCNC: 12.8 G/DL (ref 12–16)
HGB BLD-MCNC: 12.9 G/DL (ref 12–16)
HGB BLD-MCNC: 13 G/DL (ref 12–16)
HGB BLD-MCNC: 13.6 G/DL (ref 12–16)
HGB BLD-MCNC: 13.6 G/DL (ref 12–16)
HGB BLD-MCNC: 13.7 G/DL (ref 12–16)
HGB BLD-MCNC: 13.8 G/DL (ref 12–16)
HGB BLD-MCNC: 14.2 G/DL (ref 12–16)
HGB BLD-MCNC: 14.3 G/DL (ref 12–16)
HGB BLD-MCNC: 15.3 G/DL (ref 12–16)
HGB BLD-MCNC: 17 G/DL (ref 12–16)
HGB BLD-MCNC: 5.5 G/DL (ref 12–16)
HGB BLD-MCNC: 6.3 G/DL (ref 12–16)
HGB BLD-MCNC: 7.2 G/DL (ref 12–16)
HGB BLD-MCNC: 7.7 G/DL (ref 12–16)
HGB BLD-MCNC: 8 G/DL (ref 12–16)
HGB BLD-MCNC: 8.4 G/DL (ref 12–16)
HGB BLD-MCNC: 8.4 G/DL (ref 12–16)
HGB BLD-MCNC: 8.7 G/DL (ref 12–16)
HGB BLD-MCNC: 8.7 G/DL (ref 12–16)
HGB BLD-MCNC: 8.9 G/DL (ref 12–16)
HGB BLD-MCNC: 8.9 G/DL (ref 12–16)
HGB BLD-MCNC: 9.2 G/DL (ref 12–16)
HGB BLD-MCNC: 9.3 G/DL (ref 12–16)
HGB BLD-MCNC: 9.3 G/DL (ref 12–16)
HGB BLD-MCNC: 9.4 G/DL (ref 12–16)
HGB BLD-MCNC: 9.7 G/DL (ref 12–16)
HGB BLD-MCNC: 9.9 G/DL (ref 12–16)
HIV 1+2 AB+HIV1 P24 AG SERPL QL IA: NORMAL
HOROWITZ INDEX BLDA+IHG-RTO: 165 MM[HG]
HOROWITZ INDEX BLDA+IHG-RTO: 430 MM[HG]
HOROWITZ INDEX BLDA+IHG-RTO: 449 MM[HG]
HOROWITZ INDEX BLDA+IHG-RTO: 500 MM[HG]
HOROWITZ INDEX BLDV+IHG-RTO: 90 MM[HG]
HOROWITZ INDEX BLDV+IHG-RTO: 98 MM[HG]
HYALINE CASTS #/AREA URNS LPF: >10 /LPF
HYALINE CASTS #/AREA URNS LPF: >20 /LPF
HYALINE CASTS #/AREA URNS LPF: ABNORMAL /LPF
HYALINE CASTS #/AREA URNS LPF: ABNORMAL /LPF
HYALINE CASTS #/AREA URNS LPF: NORMAL /LPF
IMM GRANULOCYTES # BLD AUTO: 0.01 K/UL (ref 0–0.11)
IMM GRANULOCYTES # BLD AUTO: 0.02 K/UL (ref 0–0.11)
IMM GRANULOCYTES # BLD AUTO: 0.03 K/UL (ref 0–0.11)
IMM GRANULOCYTES # BLD AUTO: 0.04 K/UL (ref 0–0.11)
IMM GRANULOCYTES # BLD AUTO: 0.06 K/UL (ref 0–0.11)
IMM GRANULOCYTES # BLD AUTO: 0.07 K/UL (ref 0–0.11)
IMM GRANULOCYTES # BLD AUTO: 0.11 K/UL (ref 0–0.11)
IMM GRANULOCYTES # BLD AUTO: 0.12 K/UL (ref 0–0.11)
IMM GRANULOCYTES # BLD AUTO: 0.17 K/UL (ref 0–0.11)
IMM GRANULOCYTES # BLD AUTO: 0.23 K/UL (ref 0–0.11)
IMM GRANULOCYTES # BLD AUTO: 0.23 K/UL (ref 0–0.11)
IMM GRANULOCYTES # BLD AUTO: 0.24 K/UL (ref 0–0.11)
IMM GRANULOCYTES # BLD AUTO: 0.82 K/UL (ref 0–0.11)
IMM GRANULOCYTES NFR BLD AUTO: 0.2 % (ref 0–0.9)
IMM GRANULOCYTES NFR BLD AUTO: 0.3 % (ref 0–0.9)
IMM GRANULOCYTES NFR BLD AUTO: 0.4 % (ref 0–0.9)
IMM GRANULOCYTES NFR BLD AUTO: 0.5 % (ref 0–0.9)
IMM GRANULOCYTES NFR BLD AUTO: 0.6 % (ref 0–0.9)
IMM GRANULOCYTES NFR BLD AUTO: 0.7 % (ref 0–0.9)
IMM GRANULOCYTES NFR BLD AUTO: 0.9 % (ref 0–0.9)
IMM GRANULOCYTES NFR BLD AUTO: 1.3 % (ref 0–0.9)
IMM GRANULOCYTES NFR BLD AUTO: 1.3 % (ref 0–0.9)
IMM GRANULOCYTES NFR BLD AUTO: 1.6 % (ref 0–0.9)
IMM GRANULOCYTES NFR BLD AUTO: 3.6 % (ref 0–0.9)
INR PPP: 0.98 (ref 0.87–1.13)
INR PPP: 1.02 (ref 0.87–1.13)
INR PPP: 1.09 (ref 0.87–1.13)
INR PPP: 1.2 (ref 0.87–1.13)
INR PPP: 1.4 (ref 0.87–1.13)
IRON SATN MFR SERPL: 44 % (ref 15–55)
IRON SERPL-MCNC: 102 UG/DL (ref 40–170)
KETONES UR STRIP.AUTO-MCNC: ABNORMAL MG/DL
KETONES UR STRIP.AUTO-MCNC: NEGATIVE MG/DL
KETONES UR STRIP.AUTO-MCNC: NEGATIVE MG/DL
LACTATE SERPL-SCNC: 1 MMOL/L (ref 0.5–2)
LACTATE SERPL-SCNC: 1.4 MMOL/L (ref 0.5–2)
LACTATE SERPL-SCNC: 1.5 MMOL/L (ref 0.5–2)
LACTATE SERPL-SCNC: 1.5 MMOL/L (ref 0.5–2)
LACTATE SERPL-SCNC: 1.7 MMOL/L (ref 0.5–2)
LACTATE SERPL-SCNC: 2 MMOL/L (ref 0.5–2)
LACTATE SERPL-SCNC: 2.1 MMOL/L (ref 0.5–2)
LACTATE SERPL-SCNC: 2.3 MMOL/L (ref 0.5–2)
LACTATE SERPL-SCNC: 3.1 MMOL/L (ref 0.5–2)
LACTATE SERPL-SCNC: 7 MMOL/L (ref 0.5–2)
LACTOFERRIN STL QL IA: NEGATIVE
LACTOFERRIN STL QL IA: POSITIVE
LDH SERPL L TO P-CCNC: 553 U/L (ref 107–266)
LEUKOCYTE ESTERASE UR QL STRIP.AUTO: ABNORMAL
LEUKOCYTE ESTERASE UR QL STRIP.AUTO: NEGATIVE
LIPASE SERPL-CCNC: 12 U/L (ref 11–82)
LIPASE SERPL-CCNC: 32 U/L (ref 7–58)
LIPASE SERPL-CCNC: 65 U/L (ref 11–82)
LV EJECT FRACT  99904: 75
LV EJECT FRACT MOD 2C 99903: 69.98
LV EJECT FRACT MOD 4C 99902: 73.8
LV EJECT FRACT MOD BP 99901: 72.48
LYMPHOCYTES # BLD AUTO: 0.42 K/UL (ref 1–4.8)
LYMPHOCYTES # BLD AUTO: 0.44 K/UL (ref 1–4.8)
LYMPHOCYTES # BLD AUTO: 0.44 K/UL (ref 1–4.8)
LYMPHOCYTES # BLD AUTO: 0.52 K/UL (ref 1–4.8)
LYMPHOCYTES # BLD AUTO: 0.77 K/UL (ref 1–4.8)
LYMPHOCYTES # BLD AUTO: 0.79 K/UL (ref 1–4.8)
LYMPHOCYTES # BLD AUTO: 0.81 K/UL (ref 1–4.8)
LYMPHOCYTES # BLD AUTO: 0.82 K/UL (ref 1–4.8)
LYMPHOCYTES # BLD AUTO: 0.83 K/UL (ref 1–4.8)
LYMPHOCYTES # BLD AUTO: 0.84 K/UL (ref 1–4.8)
LYMPHOCYTES # BLD AUTO: 0.85 K/UL (ref 1–4.8)
LYMPHOCYTES # BLD AUTO: 0.86 K/UL (ref 1–4.8)
LYMPHOCYTES # BLD AUTO: 0.88 K/UL (ref 1–4.8)
LYMPHOCYTES # BLD AUTO: 0.97 K/UL (ref 1–4.8)
LYMPHOCYTES # BLD AUTO: 0.99 K/UL (ref 1–4.8)
LYMPHOCYTES # BLD AUTO: 1.01 K/UL (ref 1–4.8)
LYMPHOCYTES # BLD AUTO: 1.03 K/UL (ref 1–4.8)
LYMPHOCYTES # BLD AUTO: 1.03 K/UL (ref 1–4.8)
LYMPHOCYTES # BLD AUTO: 1.12 K/UL (ref 1–4.8)
LYMPHOCYTES # BLD AUTO: 1.13 K/UL (ref 1–4.8)
LYMPHOCYTES # BLD AUTO: 1.14 K/UL (ref 1–4.8)
LYMPHOCYTES # BLD AUTO: 1.16 K/UL (ref 1–4.8)
LYMPHOCYTES # BLD AUTO: 1.2 K/UL (ref 1–4.8)
LYMPHOCYTES # BLD AUTO: 1.24 K/UL (ref 1–4.8)
LYMPHOCYTES # BLD AUTO: 1.39 K/UL (ref 1–4.8)
LYMPHOCYTES # BLD AUTO: 1.44 K/UL (ref 1–4.8)
LYMPHOCYTES # BLD AUTO: 1.46 K/UL (ref 1–4.8)
LYMPHOCYTES # BLD AUTO: 1.74 K/UL (ref 1–4.8)
LYMPHOCYTES NFR BLD: 1.7 % (ref 22–41)
LYMPHOCYTES NFR BLD: 10.3 % (ref 22–41)
LYMPHOCYTES NFR BLD: 12.4 % (ref 22–41)
LYMPHOCYTES NFR BLD: 15.8 % (ref 22–41)
LYMPHOCYTES NFR BLD: 16.7 % (ref 22–41)
LYMPHOCYTES NFR BLD: 18.8 % (ref 22–41)
LYMPHOCYTES NFR BLD: 2.6 % (ref 22–41)
LYMPHOCYTES NFR BLD: 2.9 % (ref 22–41)
LYMPHOCYTES NFR BLD: 20.8 % (ref 22–41)
LYMPHOCYTES NFR BLD: 22.4 % (ref 22–41)
LYMPHOCYTES NFR BLD: 22.5 % (ref 22–41)
LYMPHOCYTES NFR BLD: 25.6 % (ref 22–41)
LYMPHOCYTES NFR BLD: 25.8 % (ref 22–41)
LYMPHOCYTES NFR BLD: 26.7 % (ref 22–41)
LYMPHOCYTES NFR BLD: 27.8 % (ref 22–41)
LYMPHOCYTES NFR BLD: 3.4 % (ref 22–41)
LYMPHOCYTES NFR BLD: 30.8 % (ref 22–41)
LYMPHOCYTES NFR BLD: 31.7 % (ref 22–41)
LYMPHOCYTES NFR BLD: 32.6 % (ref 22–41)
LYMPHOCYTES NFR BLD: 34.9 % (ref 22–41)
LYMPHOCYTES NFR BLD: 4.3 % (ref 22–41)
LYMPHOCYTES NFR BLD: 4.4 % (ref 22–41)
LYMPHOCYTES NFR BLD: 4.5 % (ref 22–41)
LYMPHOCYTES NFR BLD: 42.1 % (ref 22–41)
LYMPHOCYTES NFR BLD: 53.4 % (ref 22–41)
LYMPHOCYTES NFR BLD: 6 % (ref 22–41)
LYMPHOCYTES NFR BLD: 7.2 % (ref 22–41)
LYMPHOCYTES NFR BLD: 8.4 % (ref 22–41)
LYMPHOCYTES NFR FLD: 3 %
MACROCYTES BLD QL SMEAR: ABNORMAL
MAGNESIUM SERPL-MCNC: 0.9 MG/DL (ref 1.5–2.5)
MAGNESIUM SERPL-MCNC: 1.1 MG/DL (ref 1.5–2.5)
MAGNESIUM SERPL-MCNC: 1.3 MG/DL (ref 1.5–2.5)
MAGNESIUM SERPL-MCNC: 1.3 MG/DL (ref 1.5–2.5)
MAGNESIUM SERPL-MCNC: 1.4 MG/DL (ref 1.5–2.5)
MAGNESIUM SERPL-MCNC: 1.4 MG/DL (ref 1.5–2.5)
MAGNESIUM SERPL-MCNC: 1.5 MG/DL (ref 1.5–2.5)
MAGNESIUM SERPL-MCNC: 1.6 MG/DL (ref 1.5–2.5)
MAGNESIUM SERPL-MCNC: 1.7 MG/DL (ref 1.5–2.5)
MAGNESIUM SERPL-MCNC: 1.7 MG/DL (ref 1.5–2.5)
MAGNESIUM SERPL-MCNC: 1.8 MG/DL (ref 1.5–2.5)
MAGNESIUM SERPL-MCNC: 2 MG/DL (ref 1.5–2.5)
MAGNESIUM SERPL-MCNC: 2.1 MG/DL (ref 1.5–2.5)
MAGNESIUM SERPL-MCNC: 2.2 MG/DL (ref 1.5–2.5)
MAGNESIUM SERPL-MCNC: 2.3 MG/DL (ref 1.5–2.5)
MAGNESIUM SERPL-MCNC: 2.8 MG/DL (ref 1.5–2.5)
MAGNESIUM SERPL-MCNC: 3.4 MG/DL (ref 1.5–2.5)
MANUAL DIFF BLD: NORMAL
MANUAL DIFF BLD: NORMAL
MCF BLD TEG: 54.6 MM (ref 52–69)
MCF.PLATELET INHIB BLD ROTEM: 14 MM (ref 15–32)
MCH RBC QN AUTO: 30.7 PG (ref 27–33)
MCH RBC QN AUTO: 30.9 PG (ref 27–33)
MCH RBC QN AUTO: 30.9 PG (ref 27–33)
MCH RBC QN AUTO: 31 PG (ref 27–33)
MCH RBC QN AUTO: 31 PG (ref 27–33)
MCH RBC QN AUTO: 31.1 PG (ref 27–33)
MCH RBC QN AUTO: 31.3 PG (ref 27–33)
MCH RBC QN AUTO: 31.4 PG (ref 27–33)
MCH RBC QN AUTO: 31.4 PG (ref 27–33)
MCH RBC QN AUTO: 31.5 PG (ref 27–33)
MCH RBC QN AUTO: 31.6 PG (ref 27–33)
MCH RBC QN AUTO: 31.7 PG (ref 27–33)
MCH RBC QN AUTO: 31.8 PG (ref 27–33)
MCH RBC QN AUTO: 31.9 PG (ref 27–33)
MCH RBC QN AUTO: 31.9 PG (ref 27–33)
MCH RBC QN AUTO: 32.1 PG (ref 27–33)
MCH RBC QN AUTO: 32.2 PG (ref 27–33)
MCH RBC QN AUTO: 32.2 PG (ref 27–33)
MCH RBC QN AUTO: 32.4 PG (ref 27–33)
MCH RBC QN AUTO: 32.5 PG (ref 27–33)
MCH RBC QN AUTO: 32.6 PG (ref 27–33)
MCH RBC QN AUTO: 33.2 PG (ref 27–33)
MCH RBC QN AUTO: 33.7 PG (ref 27–33)
MCH RBC QN AUTO: 34 PG (ref 27–33)
MCH RBC QN AUTO: 34.4 PG (ref 27–33)
MCH RBC QN AUTO: 34.7 PG (ref 27–33)
MCH RBC QN AUTO: 34.8 PG (ref 27–33)
MCH RBC QN AUTO: 34.8 PG (ref 27–33)
MCH RBC QN AUTO: 35.2 PG (ref 27–33)
MCH RBC QN AUTO: 35.5 PG (ref 27–33)
MCH RBC QN AUTO: 35.6 PG (ref 27–33)
MCH RBC QN AUTO: 35.7 PG (ref 27–33)
MCHC RBC AUTO-ENTMCNC: 30.6 G/DL (ref 33.6–35)
MCHC RBC AUTO-ENTMCNC: 31.5 G/DL (ref 33.6–35)
MCHC RBC AUTO-ENTMCNC: 31.5 G/DL (ref 33.6–35)
MCHC RBC AUTO-ENTMCNC: 31.6 G/DL (ref 33.6–35)
MCHC RBC AUTO-ENTMCNC: 31.6 G/DL (ref 33.6–35)
MCHC RBC AUTO-ENTMCNC: 32 G/DL (ref 33.6–35)
MCHC RBC AUTO-ENTMCNC: 32.1 G/DL (ref 33.6–35)
MCHC RBC AUTO-ENTMCNC: 32.2 G/DL (ref 33.6–35)
MCHC RBC AUTO-ENTMCNC: 32.2 G/DL (ref 33.6–35)
MCHC RBC AUTO-ENTMCNC: 32.3 G/DL (ref 33.6–35)
MCHC RBC AUTO-ENTMCNC: 32.3 G/DL (ref 33.6–35)
MCHC RBC AUTO-ENTMCNC: 32.5 G/DL (ref 33.6–35)
MCHC RBC AUTO-ENTMCNC: 32.6 G/DL (ref 33.6–35)
MCHC RBC AUTO-ENTMCNC: 32.7 G/DL (ref 33.6–35)
MCHC RBC AUTO-ENTMCNC: 32.8 G/DL (ref 33.6–35)
MCHC RBC AUTO-ENTMCNC: 32.9 G/DL (ref 33.6–35)
MCHC RBC AUTO-ENTMCNC: 33 G/DL (ref 33.6–35)
MCHC RBC AUTO-ENTMCNC: 33.1 G/DL (ref 33.6–35)
MCHC RBC AUTO-ENTMCNC: 33.2 G/DL (ref 33.6–35)
MCHC RBC AUTO-ENTMCNC: 33.3 G/DL (ref 33.6–35)
MCHC RBC AUTO-ENTMCNC: 33.4 G/DL (ref 33.6–35)
MCHC RBC AUTO-ENTMCNC: 33.4 G/DL (ref 33.6–35)
MCHC RBC AUTO-ENTMCNC: 33.5 G/DL (ref 33.6–35)
MCHC RBC AUTO-ENTMCNC: 33.5 G/DL (ref 33.6–35)
MCHC RBC AUTO-ENTMCNC: 33.7 G/DL (ref 33.6–35)
MCHC RBC AUTO-ENTMCNC: 33.8 G/DL (ref 33.6–35)
MCHC RBC AUTO-ENTMCNC: 33.9 G/DL (ref 33.6–35)
MCHC RBC AUTO-ENTMCNC: 34.1 G/DL (ref 33.6–35)
MCHC RBC AUTO-ENTMCNC: 34.9 G/DL (ref 33.6–35)
MCHC RBC AUTO-ENTMCNC: 35.1 G/DL (ref 33.6–35)
MCHC RBC AUTO-ENTMCNC: 35.3 G/DL (ref 33.6–35)
MCHC RBC AUTO-ENTMCNC: 35.6 G/DL (ref 33.6–35)
MCHC RBC AUTO-ENTMCNC: 36.1 G/DL (ref 33.6–35)
MCHC RBC AUTO-ENTMCNC: 36.5 G/DL (ref 33.6–35)
MCV RBC AUTO: 100 FL (ref 81.4–97.8)
MCV RBC AUTO: 100.3 FL (ref 81.4–97.8)
MCV RBC AUTO: 100.4 FL (ref 81.4–97.8)
MCV RBC AUTO: 102.2 FL (ref 81.4–97.8)
MCV RBC AUTO: 102.7 FL (ref 81.4–97.8)
MCV RBC AUTO: 102.8 FL (ref 81.4–97.8)
MCV RBC AUTO: 103.3 FL (ref 81.4–97.8)
MCV RBC AUTO: 105.2 FL (ref 81.4–97.8)
MCV RBC AUTO: 106.6 FL (ref 81.4–97.8)
MCV RBC AUTO: 106.9 FL (ref 81.4–97.8)
MCV RBC AUTO: 106.9 FL (ref 81.4–97.8)
MCV RBC AUTO: 110.4 FL (ref 81.4–97.8)
MCV RBC AUTO: 85.7 FL (ref 81.4–97.8)
MCV RBC AUTO: 89.1 FL (ref 81.4–97.8)
MCV RBC AUTO: 89.6 FL (ref 81.4–97.8)
MCV RBC AUTO: 92.5 FL (ref 81.4–97.8)
MCV RBC AUTO: 93.1 FL (ref 81.4–97.8)
MCV RBC AUTO: 93.2 FL (ref 81.4–97.8)
MCV RBC AUTO: 93.6 FL (ref 81.4–97.8)
MCV RBC AUTO: 93.6 FL (ref 81.4–97.8)
MCV RBC AUTO: 94.2 FL (ref 81.4–97.8)
MCV RBC AUTO: 94.3 FL (ref 81.4–97.8)
MCV RBC AUTO: 94.4 FL (ref 81.4–97.8)
MCV RBC AUTO: 94.6 FL (ref 81.4–97.8)
MCV RBC AUTO: 94.9 FL (ref 81.4–97.8)
MCV RBC AUTO: 95 FL (ref 81.4–97.8)
MCV RBC AUTO: 95.5 FL (ref 81.4–97.8)
MCV RBC AUTO: 95.8 FL (ref 81.4–97.8)
MCV RBC AUTO: 95.9 FL (ref 81.4–97.8)
MCV RBC AUTO: 95.9 FL (ref 81.4–97.8)
MCV RBC AUTO: 96 FL (ref 81.4–97.8)
MCV RBC AUTO: 96.1 FL (ref 81.4–97.8)
MCV RBC AUTO: 96.2 FL (ref 81.4–97.8)
MCV RBC AUTO: 96.4 FL (ref 81.4–97.8)
MCV RBC AUTO: 96.8 FL (ref 81.4–97.8)
MCV RBC AUTO: 97.4 FL (ref 81.4–97.8)
MCV RBC AUTO: 97.9 FL (ref 81.4–97.8)
MCV RBC AUTO: 98.2 FL (ref 81.4–97.8)
MCV RBC AUTO: 98.6 FL (ref 81.4–97.8)
MCV RBC AUTO: 98.7 FL (ref 81.4–97.8)
MCV RBC AUTO: 98.8 FL (ref 81.4–97.8)
MCV RBC AUTO: 98.9 FL (ref 81.4–97.8)
MCV RBC AUTO: 99.3 FL (ref 81.4–97.8)
MCV RBC AUTO: 99.7 FL (ref 81.4–97.8)
MESOTHL CELL NFR FLD: 1 %
METAMYELOCYTES NFR BLD MANUAL: 5.9 %
METHADONE UR QL SCN: NEGATIVE
MICRO URNS: ABNORMAL
MICROCYTES BLD QL SMEAR: ABNORMAL
MICROCYTES BLD QL SMEAR: ABNORMAL
MODE IMODE: ABNORMAL
MONOCYTES # BLD AUTO: 0.11 K/UL (ref 0–0.85)
MONOCYTES # BLD AUTO: 0.12 K/UL (ref 0–0.85)
MONOCYTES # BLD AUTO: 0.22 K/UL (ref 0–0.85)
MONOCYTES # BLD AUTO: 0.3 K/UL (ref 0–0.85)
MONOCYTES # BLD AUTO: 0.42 K/UL (ref 0–0.85)
MONOCYTES # BLD AUTO: 0.44 K/UL (ref 0–0.85)
MONOCYTES # BLD AUTO: 0.45 K/UL (ref 0–0.85)
MONOCYTES # BLD AUTO: 0.48 K/UL (ref 0–0.85)
MONOCYTES # BLD AUTO: 0.52 K/UL (ref 0–0.85)
MONOCYTES # BLD AUTO: 0.6 K/UL (ref 0–0.85)
MONOCYTES # BLD AUTO: 0.64 K/UL (ref 0–0.85)
MONOCYTES # BLD AUTO: 0.65 K/UL (ref 0–0.85)
MONOCYTES # BLD AUTO: 0.65 K/UL (ref 0–0.85)
MONOCYTES # BLD AUTO: 0.67 K/UL (ref 0–0.85)
MONOCYTES # BLD AUTO: 0.69 K/UL (ref 0–0.85)
MONOCYTES # BLD AUTO: 0.71 K/UL (ref 0–0.85)
MONOCYTES # BLD AUTO: 0.79 K/UL (ref 0–0.85)
MONOCYTES # BLD AUTO: 0.84 K/UL (ref 0–0.85)
MONOCYTES # BLD AUTO: 0.87 K/UL (ref 0–0.85)
MONOCYTES # BLD AUTO: 0.99 K/UL (ref 0–0.85)
MONOCYTES # BLD AUTO: 0.99 K/UL (ref 0–0.85)
MONOCYTES # BLD AUTO: 1.04 K/UL (ref 0–0.85)
MONOCYTES # BLD AUTO: 1.15 K/UL (ref 0–0.85)
MONOCYTES # BLD AUTO: 1.19 K/UL (ref 0–0.85)
MONOCYTES # BLD AUTO: 1.2 K/UL (ref 0–0.85)
MONOCYTES # BLD AUTO: 1.28 K/UL (ref 0–0.85)
MONOCYTES # BLD AUTO: 1.4 K/UL (ref 0–0.85)
MONOCYTES # BLD AUTO: 1.92 K/UL (ref 0–0.85)
MONOCYTES NFR BLD AUTO: 11.4 % (ref 0–13.4)
MONOCYTES NFR BLD AUTO: 11.8 % (ref 0–13.4)
MONOCYTES NFR BLD AUTO: 12.8 % (ref 0–13.4)
MONOCYTES NFR BLD AUTO: 16 % (ref 0–13.4)
MONOCYTES NFR BLD AUTO: 16.8 % (ref 0–13.4)
MONOCYTES NFR BLD AUTO: 19.5 % (ref 0–13.4)
MONOCYTES NFR BLD AUTO: 20.1 % (ref 0–13.4)
MONOCYTES NFR BLD AUTO: 20.2 % (ref 0–13.4)
MONOCYTES NFR BLD AUTO: 20.5 % (ref 0–13.4)
MONOCYTES NFR BLD AUTO: 3 % (ref 0–13.4)
MONOCYTES NFR BLD AUTO: 3.4 % (ref 0–13.4)
MONOCYTES NFR BLD AUTO: 4.2 % (ref 0–13.4)
MONOCYTES NFR BLD AUTO: 5.8 % (ref 0–13.4)
MONOCYTES NFR BLD AUTO: 5.9 % (ref 0–13.4)
MONOCYTES NFR BLD AUTO: 6.2 % (ref 0–13.4)
MONOCYTES NFR BLD AUTO: 6.2 % (ref 0–13.4)
MONOCYTES NFR BLD AUTO: 6.5 % (ref 0–13.4)
MONOCYTES NFR BLD AUTO: 6.7 % (ref 0–13.4)
MONOCYTES NFR BLD AUTO: 6.9 % (ref 0–13.4)
MONOCYTES NFR BLD AUTO: 7.2 % (ref 0–13.4)
MONOCYTES NFR BLD AUTO: 7.3 % (ref 0–13.4)
MONOCYTES NFR BLD AUTO: 7.6 % (ref 0–13.4)
MONOCYTES NFR BLD AUTO: 8.7 % (ref 0–13.4)
MONOCYTES NFR BLD AUTO: 9.2 % (ref 0–13.4)
MONOCYTES NFR BLD AUTO: 9.3 % (ref 0–13.4)
MONOCYTES NFR BLD AUTO: 9.6 % (ref 0–13.4)
MONOCYTES NFR BLD AUTO: 9.8 % (ref 0–13.4)
MONOCYTES NFR BLD AUTO: 9.8 % (ref 0–13.4)
MONONUC CELLS NFR FLD: 5 %
MORPHOLOGY BLD-IMP: NORMAL
MUCOUS THREADS #/AREA URNS HPF: ABNORMAL /HPF
MYCOBACTERIUM SPEC CULT: NORMAL
MYELOCYTES NFR BLD MANUAL: 2.5 %
MYELOCYTES NFR BLD MANUAL: 3 %
NEUTROPHILS # BLD AUTO: 0.67 K/UL (ref 2–7.15)
NEUTROPHILS # BLD AUTO: 1.29 K/UL (ref 2–7.15)
NEUTROPHILS # BLD AUTO: 1.4 K/UL (ref 2–7.15)
NEUTROPHILS # BLD AUTO: 1.42 K/UL (ref 2–7.15)
NEUTROPHILS # BLD AUTO: 1.52 K/UL (ref 2–7.15)
NEUTROPHILS # BLD AUTO: 1.7 K/UL (ref 2–7.15)
NEUTROPHILS # BLD AUTO: 1.73 K/UL (ref 2–7.15)
NEUTROPHILS # BLD AUTO: 1.81 K/UL (ref 2–7.15)
NEUTROPHILS # BLD AUTO: 1.98 K/UL (ref 2–7.15)
NEUTROPHILS # BLD AUTO: 10.72 K/UL (ref 2–7.15)
NEUTROPHILS # BLD AUTO: 12.23 K/UL (ref 2–7.15)
NEUTROPHILS # BLD AUTO: 13.9 K/UL (ref 2–7.15)
NEUTROPHILS # BLD AUTO: 14.47 K/UL (ref 2–7.15)
NEUTROPHILS # BLD AUTO: 15.43 K/UL (ref 2–7.15)
NEUTROPHILS # BLD AUTO: 15.62 K/UL (ref 2–7.15)
NEUTROPHILS # BLD AUTO: 16.03 K/UL (ref 2–7.15)
NEUTROPHILS # BLD AUTO: 19.45 K/UL (ref 2–7.15)
NEUTROPHILS # BLD AUTO: 2.45 K/UL (ref 2–7.15)
NEUTROPHILS # BLD AUTO: 22.78 K/UL (ref 2–7.15)
NEUTROPHILS # BLD AUTO: 4.14 K/UL (ref 2–7.15)
NEUTROPHILS # BLD AUTO: 4.21 K/UL (ref 2–7.15)
NEUTROPHILS # BLD AUTO: 4.75 K/UL (ref 2–7.15)
NEUTROPHILS # BLD AUTO: 4.81 K/UL (ref 2–7.15)
NEUTROPHILS # BLD AUTO: 4.94 K/UL (ref 2–7.15)
NEUTROPHILS # BLD AUTO: 4.96 K/UL (ref 2–7.15)
NEUTROPHILS # BLD AUTO: 5.16 K/UL (ref 2–7.15)
NEUTROPHILS # BLD AUTO: 6.71 K/UL (ref 2–7.15)
NEUTROPHILS # BLD AUTO: 9.02 K/UL (ref 2–7.15)
NEUTROPHILS NFR BLD: 35.5 % (ref 44–72)
NEUTROPHILS NFR BLD: 43.8 % (ref 44–72)
NEUTROPHILS NFR BLD: 44 % (ref 44–72)
NEUTROPHILS NFR BLD: 48.2 % (ref 44–72)
NEUTROPHILS NFR BLD: 49.7 % (ref 44–72)
NEUTROPHILS NFR BLD: 51.3 % (ref 44–72)
NEUTROPHILS NFR BLD: 52 % (ref 44–72)
NEUTROPHILS NFR BLD: 52.5 % (ref 44–72)
NEUTROPHILS NFR BLD: 55.1 % (ref 44–72)
NEUTROPHILS NFR BLD: 60 % (ref 44–72)
NEUTROPHILS NFR BLD: 62 % (ref 44–72)
NEUTROPHILS NFR BLD: 64.7 % (ref 44–72)
NEUTROPHILS NFR BLD: 66.9 % (ref 44–72)
NEUTROPHILS NFR BLD: 69.4 % (ref 44–72)
NEUTROPHILS NFR BLD: 71.4 % (ref 44–72)
NEUTROPHILS NFR BLD: 72.8 % (ref 44–72)
NEUTROPHILS NFR BLD: 77.8 % (ref 44–72)
NEUTROPHILS NFR BLD: 78.2 % (ref 44–72)
NEUTROPHILS NFR BLD: 80.1 % (ref 44–72)
NEUTROPHILS NFR BLD: 80.9 % (ref 44–72)
NEUTROPHILS NFR BLD: 82.2 % (ref 44–72)
NEUTROPHILS NFR BLD: 85.5 % (ref 44–72)
NEUTROPHILS NFR BLD: 87 % (ref 44–72)
NEUTROPHILS NFR BLD: 87.7 % (ref 44–72)
NEUTROPHILS NFR BLD: 88.3 % (ref 44–72)
NEUTROPHILS NFR BLD: 89.4 % (ref 44–72)
NEUTROPHILS NFR BLD: 89.6 % (ref 44–72)
NEUTROPHILS NFR BLD: 90.4 % (ref 44–72)
NEUTROPHILS NFR FLD: 59 %
NEUTS BAND NFR BLD MANUAL: 0.9 % (ref 0–10)
NEUTS BAND NFR BLD MANUAL: 1 % (ref 0–10)
NITRITE UR QL STRIP.AUTO: NEGATIVE
NITRITE UR QL STRIP.AUTO: POSITIVE
NRBC # BLD AUTO: 0 K/UL
NRBC # BLD AUTO: 0.02 K/UL
NRBC # BLD AUTO: 0.04 K/UL
NRBC # BLD AUTO: 0.22 K/UL
NRBC # BLD AUTO: 0.3 K/UL
NRBC # BLD AUTO: 1.04 K/UL
NRBC BLD-RTO: 0 /100 WBC
NRBC BLD-RTO: 0.1 /100 WBC
NRBC BLD-RTO: 0.2 /100 WBC
NRBC BLD-RTO: 1.3 /100 WBC
NRBC BLD-RTO: 1.6 /100 WBC
NRBC BLD-RTO: 8.1 /100 WBC
O2/TOTAL GAS SETTING VFR VENT: 100 %
O2/TOTAL GAS SETTING VFR VENT: 100 %
O2/TOTAL GAS SETTING VFR VENT: 30 %
O2/TOTAL GAS SETTING VFR VENT: 40 %
OPIATES UR QL SCN: NEGATIVE
OSMOLALITY SERPL: 348 MOSM/KG H2O (ref 278–298)
OUTPT INFUS CBC COMMENT OICOM: ABNORMAL
OXYCODONE UR QL SCN: POSITIVE
PA AA BLD-ACNC: 18.4 % (ref 0–11)
PA ADP BLD-ACNC: 48.5 % (ref 0–17)
PATHOLOGY CONSULT NOTE: NORMAL
PCO2 BLDA: 23.2 MMHG (ref 26–37)
PCO2 BLDA: 27.7 MMHG (ref 26–37)
PCO2 BLDA: 28.7 MMHG (ref 26–37)
PCO2 BLDA: 35.2 MMHG (ref 26–37)
PCO2 BLDV: 25.6 MMHG (ref 41–51)
PCO2 BLDV: 27.6 MMHG (ref 41–51)
PCO2 TEMP ADJ BLDA: 23.5 MMHG (ref 26–37)
PCO2 TEMP ADJ BLDA: 23.8 MMHG (ref 26–37)
PCO2 TEMP ADJ BLDA: 26.2 MMHG (ref 26–37)
PCO2 TEMP ADJ BLDA: 26.8 MMHG (ref 26–37)
PCO2 TEMP ADJ BLDV: 27.4 MMHG (ref 41–51)
PCP UR QL SCN: NEGATIVE
PEEP END EXPIRATORY PRESSURE IPEEP: 8 CMH20
PERCENT MINUTE VOLUME IPMV: 100
PH BLDA: 7 [PH] (ref 7.4–7.5)
PH BLDA: 7.32 [PH] (ref 7.4–7.5)
PH BLDA: 7.42 [PH] (ref 7.4–7.5)
PH BLDA: 7.5 [PH] (ref 7.4–7.5)
PH BLDV: 7.42 [PH] (ref 7.31–7.45)
PH BLDV: 7.47 [PH] (ref 7.31–7.45)
PH TEMP ADJ BLDA: 7.08 [PH] (ref 7.4–7.5)
PH TEMP ADJ BLDA: 7.38 [PH] (ref 7.4–7.5)
PH TEMP ADJ BLDA: 7.43 [PH] (ref 7.4–7.5)
PH TEMP ADJ BLDA: 7.49 [PH] (ref 7.4–7.5)
PH TEMP ADJ BLDV: 7.47 [PH] (ref 7.31–7.45)
PH UR STRIP.AUTO: 5 [PH] (ref 5–8)
PH UR STRIP.AUTO: 5.5 [PH] (ref 5–8)
PH UR STRIP.AUTO: 6 [PH] (ref 5–8)
PHOSPHATE SERPL-MCNC: 1.7 MG/DL (ref 2.5–4.5)
PHOSPHATE SERPL-MCNC: 1.8 MG/DL (ref 2.5–4.5)
PHOSPHATE SERPL-MCNC: 1.8 MG/DL (ref 2.5–4.5)
PHOSPHATE SERPL-MCNC: 1.9 MG/DL (ref 2.5–4.5)
PHOSPHATE SERPL-MCNC: 2 MG/DL (ref 2.5–4.5)
PHOSPHATE SERPL-MCNC: 2.3 MG/DL (ref 2.5–4.5)
PHOSPHATE SERPL-MCNC: 2.6 MG/DL (ref 2.5–4.5)
PHOSPHATE SERPL-MCNC: 2.7 MG/DL (ref 2.5–4.5)
PHOSPHATE SERPL-MCNC: 2.8 MG/DL (ref 2.5–4.5)
PHOSPHATE SERPL-MCNC: 3 MG/DL (ref 2.5–4.5)
PHOSPHATE SERPL-MCNC: 3.4 MG/DL (ref 2.5–4.5)
PHOSPHATE SERPL-MCNC: 3.5 MG/DL (ref 2.5–4.5)
PHOSPHATE SERPL-MCNC: 4 MG/DL (ref 2.5–4.5)
PHOSPHATE SERPL-MCNC: 4.1 MG/DL (ref 2.5–4.5)
PHOSPHATE SERPL-MCNC: 4.7 MG/DL (ref 2.5–4.5)
PHOSPHATE SERPL-MCNC: 5.6 MG/DL (ref 2.5–4.5)
PHOSPHATE SERPL-MCNC: 8.5 MG/DL (ref 2.5–4.5)
PLATELET # BLD AUTO: 103 K/UL (ref 164–446)
PLATELET # BLD AUTO: 106 K/UL (ref 164–446)
PLATELET # BLD AUTO: 112 K/UL (ref 164–446)
PLATELET # BLD AUTO: 122 K/UL (ref 164–446)
PLATELET # BLD AUTO: 124 K/UL (ref 164–446)
PLATELET # BLD AUTO: 124 K/UL (ref 164–446)
PLATELET # BLD AUTO: 147 K/UL (ref 164–446)
PLATELET # BLD AUTO: 152 K/UL (ref 164–446)
PLATELET # BLD AUTO: 153 K/UL (ref 164–446)
PLATELET # BLD AUTO: 169 K/UL (ref 164–446)
PLATELET # BLD AUTO: 174 K/UL (ref 164–446)
PLATELET # BLD AUTO: 177 K/UL (ref 164–446)
PLATELET # BLD AUTO: 185 K/UL (ref 164–446)
PLATELET # BLD AUTO: 186 K/UL (ref 164–446)
PLATELET # BLD AUTO: 192 K/UL (ref 164–446)
PLATELET # BLD AUTO: 195 K/UL (ref 164–446)
PLATELET # BLD AUTO: 195 K/UL (ref 164–446)
PLATELET # BLD AUTO: 197 K/UL (ref 164–446)
PLATELET # BLD AUTO: 215 K/UL (ref 164–446)
PLATELET # BLD AUTO: 218 K/UL (ref 164–446)
PLATELET # BLD AUTO: 230 K/UL (ref 164–446)
PLATELET # BLD AUTO: 232 K/UL (ref 164–446)
PLATELET # BLD AUTO: 233 K/UL (ref 164–446)
PLATELET # BLD AUTO: 238 K/UL (ref 164–446)
PLATELET # BLD AUTO: 243 K/UL (ref 164–446)
PLATELET # BLD AUTO: 253 K/UL (ref 164–446)
PLATELET # BLD AUTO: 259 K/UL (ref 164–446)
PLATELET # BLD AUTO: 260 K/UL (ref 164–446)
PLATELET # BLD AUTO: 261 K/UL (ref 164–446)
PLATELET # BLD AUTO: 277 K/UL (ref 164–446)
PLATELET # BLD AUTO: 290 K/UL (ref 164–446)
PLATELET # BLD AUTO: 291 K/UL (ref 164–446)
PLATELET # BLD AUTO: 300 K/UL (ref 164–446)
PLATELET # BLD AUTO: 306 K/UL (ref 164–446)
PLATELET # BLD AUTO: 310 K/UL (ref 164–446)
PLATELET # BLD AUTO: 313 K/UL (ref 164–446)
PLATELET # BLD AUTO: 331 K/UL (ref 164–446)
PLATELET # BLD AUTO: 334 K/UL (ref 164–446)
PLATELET # BLD AUTO: 350 K/UL (ref 164–446)
PLATELET # BLD AUTO: 48 K/UL (ref 164–446)
PLATELET # BLD AUTO: 57 K/UL (ref 164–446)
PLATELET # BLD AUTO: 83 K/UL (ref 164–446)
PLATELET # BLD AUTO: 86 K/UL (ref 164–446)
PLATELET # BLD AUTO: 89 K/UL (ref 164–446)
PLATELET # BLD AUTO: 93 K/UL (ref 164–446)
PLATELET # BLD AUTO: 94 K/UL (ref 164–446)
PLATELET # BLD AUTO: 97 K/UL (ref 164–446)
PLATELET BLD QL SMEAR: NORMAL
PMV BLD AUTO: 10 FL (ref 9–12.9)
PMV BLD AUTO: 10.1 FL (ref 9–12.9)
PMV BLD AUTO: 10.2 FL (ref 9–12.9)
PMV BLD AUTO: 10.3 FL (ref 9–12.9)
PMV BLD AUTO: 10.5 FL (ref 9–12.9)
PMV BLD AUTO: 10.5 FL (ref 9–12.9)
PMV BLD AUTO: 10.7 FL (ref 9–12.9)
PMV BLD AUTO: 10.8 FL (ref 9–12.9)
PMV BLD AUTO: 11 FL (ref 9–12.9)
PMV BLD AUTO: 11.1 FL (ref 9–12.9)
PMV BLD AUTO: 11.2 FL (ref 9–12.9)
PMV BLD AUTO: 11.3 FL (ref 9–12.9)
PMV BLD AUTO: 11.4 FL (ref 9–12.9)
PMV BLD AUTO: 11.5 FL (ref 9–12.9)
PMV BLD AUTO: 11.5 FL (ref 9–12.9)
PMV BLD AUTO: 11.6 FL (ref 9–12.9)
PMV BLD AUTO: 11.6 FL (ref 9–12.9)
PMV BLD AUTO: 11.7 FL (ref 9–12.9)
PMV BLD AUTO: 11.8 FL (ref 9–12.9)
PMV BLD AUTO: 12 FL (ref 9–12.9)
PMV BLD AUTO: 12 FL (ref 9–12.9)
PMV BLD AUTO: 12.1 FL (ref 9–12.9)
PMV BLD AUTO: 12.1 FL (ref 9–12.9)
PMV BLD AUTO: 12.3 FL (ref 9–12.9)
PMV BLD AUTO: 12.3 FL (ref 9–12.9)
PMV BLD AUTO: 12.4 FL (ref 9–12.9)
PMV BLD AUTO: 12.4 FL (ref 9–12.9)
PMV BLD AUTO: 12.8 FL (ref 9–12.9)
PMV BLD AUTO: 12.9 FL (ref 9–12.9)
PMV BLD AUTO: 13.4 FL (ref 9–12.9)
PMV BLD AUTO: 9.5 FL (ref 9–12.9)
PMV BLD AUTO: 9.5 FL (ref 9–12.9)
PMV BLD AUTO: 9.7 FL (ref 9–12.9)
PMV BLD AUTO: 9.9 FL (ref 9–12.9)
PO2 BLDA: 129 MMHG (ref 64–87)
PO2 BLDA: 449 MMHG (ref 64–87)
PO2 BLDA: 66 MMHG (ref 64–87)
PO2 BLDA: >500 MMHG (ref 64–87)
PO2 BLDV: 36 MMHG (ref 25–40)
PO2 BLDV: 39 MMHG (ref 25–40)
PO2 TEMP ADJ BLDA: 124 MMHG (ref 64–87)
PO2 TEMP ADJ BLDA: 423 MMHG (ref 64–87)
PO2 TEMP ADJ BLDA: 67 MMHG (ref 64–87)
PO2 TEMP ADJ BLDA: >500 MMHG (ref 64–87)
PO2 TEMP ADJ BLDV: 38 MMHG (ref 25–40)
POLYCHROMASIA BLD QL SMEAR: NORMAL
POTASSIUM BLD-SCNC: 2.5 MMOL/L (ref 3.6–5.5)
POTASSIUM SERPL-SCNC: 2.1 MMOL/L (ref 3.6–5.5)
POTASSIUM SERPL-SCNC: 2.3 MMOL/L (ref 3.6–5.5)
POTASSIUM SERPL-SCNC: 2.5 MMOL/L (ref 3.6–5.5)
POTASSIUM SERPL-SCNC: 2.6 MMOL/L (ref 3.6–5.5)
POTASSIUM SERPL-SCNC: 2.7 MMOL/L (ref 3.6–5.5)
POTASSIUM SERPL-SCNC: 2.8 MMOL/L (ref 3.6–5.5)
POTASSIUM SERPL-SCNC: 2.8 MMOL/L (ref 3.6–5.5)
POTASSIUM SERPL-SCNC: 2.9 MMOL/L (ref 3.6–5.5)
POTASSIUM SERPL-SCNC: 3 MMOL/L (ref 3.6–5.5)
POTASSIUM SERPL-SCNC: 3.1 MMOL/L (ref 3.6–5.5)
POTASSIUM SERPL-SCNC: 3.2 MMOL/L (ref 3.6–5.5)
POTASSIUM SERPL-SCNC: 3.2 MMOL/L (ref 3.6–5.5)
POTASSIUM SERPL-SCNC: 3.3 MMOL/L (ref 3.6–5.5)
POTASSIUM SERPL-SCNC: 3.4 MMOL/L (ref 3.6–5.5)
POTASSIUM SERPL-SCNC: 3.5 MMOL/L (ref 3.6–5.5)
POTASSIUM SERPL-SCNC: 3.6 MMOL/L (ref 3.6–5.5)
POTASSIUM SERPL-SCNC: 3.7 MMOL/L (ref 3.6–5.5)
POTASSIUM SERPL-SCNC: 3.8 MMOL/L (ref 3.6–5.5)
POTASSIUM SERPL-SCNC: 3.9 MMOL/L (ref 3.6–5.5)
POTASSIUM SERPL-SCNC: 4 MMOL/L (ref 3.6–5.5)
POTASSIUM SERPL-SCNC: 4.1 MMOL/L (ref 3.6–5.5)
POTASSIUM SERPL-SCNC: 4.2 MMOL/L (ref 3.6–5.5)
POTASSIUM SERPL-SCNC: 4.3 MMOL/L (ref 3.6–5.5)
POTASSIUM SERPL-SCNC: 4.5 MMOL/L (ref 3.6–5.5)
POTASSIUM SERPL-SCNC: 4.5 MMOL/L (ref 3.6–5.5)
POTASSIUM SERPL-SCNC: 5.1 MMOL/L (ref 3.6–5.5)
POTASSIUM SERPL-SCNC: 5.1 MMOL/L (ref 3.6–5.5)
PREALB SERPL-MCNC: 16.6 MG/DL (ref 18–38)
PROCALCITONIN SERPL-MCNC: 0.21 NG/ML
PROCALCITONIN SERPL-MCNC: 0.29 NG/ML
PROCALCITONIN SERPL-MCNC: 0.36 NG/ML
PROCALCITONIN SERPL-MCNC: 0.36 NG/ML
PROCALCITONIN SERPL-MCNC: 1.02 NG/ML
PRODUCT TYPE UPROD: NORMAL
PRODUCT TYPE UPROD: NORMAL
PROLACTIN SERPL-MCNC: 19.6 NG/ML (ref 2.8–26)
PROMYELOCYTES NFR BLD MANUAL: 0.8 %
PROPOXYPH UR QL SCN: NEGATIVE
PROT SERPL-MCNC: 4.2 G/DL (ref 6–8.2)
PROT SERPL-MCNC: 4.6 G/DL (ref 6–8.2)
PROT SERPL-MCNC: 4.7 G/DL (ref 6–8.2)
PROT SERPL-MCNC: 5.3 G/DL (ref 6–8.2)
PROT SERPL-MCNC: 5.4 G/DL (ref 6–8.2)
PROT SERPL-MCNC: 5.5 G/DL (ref 6–8.2)
PROT SERPL-MCNC: 5.6 G/DL (ref 6–8.2)
PROT SERPL-MCNC: 5.9 G/DL (ref 6–8.2)
PROT SERPL-MCNC: 6.3 G/DL (ref 6–8.2)
PROT SERPL-MCNC: 6.4 G/DL (ref 6–8.2)
PROT SERPL-MCNC: 6.4 G/DL (ref 6–8.2)
PROT SERPL-MCNC: 6.6 G/DL (ref 6–8.2)
PROT SERPL-MCNC: 6.7 G/DL (ref 6–8.2)
PROT SERPL-MCNC: 6.7 G/DL (ref 6–8.2)
PROT SERPL-MCNC: 6.8 G/DL (ref 6–8.2)
PROT SERPL-MCNC: 6.9 G/DL (ref 6–8.2)
PROT SERPL-MCNC: 7 G/DL (ref 6–8.2)
PROT SERPL-MCNC: 7.1 G/DL (ref 6–8.2)
PROT SERPL-MCNC: 7.2 G/DL (ref 6–8.2)
PROT SERPL-MCNC: 7.6 G/DL (ref 6–8.2)
PROT SERPL-MCNC: 7.7 G/DL (ref 6–8.2)
PROT SERPL-MCNC: 8.3 G/DL (ref 6–8.2)
PROT UR QL STRIP: 30 MG/DL
PROT UR QL STRIP: NEGATIVE MG/DL
PROT UR QL STRIP: NEGATIVE MG/DL
PROTHROMBIN TIME: 12.7 SEC (ref 12–14.6)
PROTHROMBIN TIME: 13.1 SEC (ref 12–14.6)
PROTHROMBIN TIME: 13.7 SEC (ref 12–14.6)
PROTHROMBIN TIME: 15.1 SEC (ref 12–14.6)
PROTHROMBIN TIME: 16.9 SEC (ref 12–14.6)
RBC # BLD AUTO: 1.63 M/UL (ref 4.2–5.4)
RBC # BLD AUTO: 2.22 M/UL (ref 4.2–5.4)
RBC # BLD AUTO: 2.7 M/UL (ref 4.2–5.4)
RBC # BLD AUTO: 2.77 M/UL (ref 4.2–5.4)
RBC # BLD AUTO: 2.78 M/UL (ref 4.2–5.4)
RBC # BLD AUTO: 2.86 M/UL (ref 4.2–5.4)
RBC # BLD AUTO: 2.89 M/UL (ref 4.2–5.4)
RBC # BLD AUTO: 2.9 M/UL (ref 4.2–5.4)
RBC # BLD AUTO: 2.92 M/UL (ref 4.2–5.4)
RBC # BLD AUTO: 2.97 M/UL (ref 4.2–5.4)
RBC # BLD AUTO: 3.01 M/UL (ref 4.2–5.4)
RBC # BLD AUTO: 3.13 M/UL (ref 4.2–5.4)
RBC # BLD AUTO: 3.16 M/UL (ref 4.2–5.4)
RBC # BLD AUTO: 3.18 M/UL (ref 4.2–5.4)
RBC # BLD AUTO: 3.2 M/UL (ref 4.2–5.4)
RBC # BLD AUTO: 3.25 M/UL (ref 4.2–5.4)
RBC # BLD AUTO: 3.3 M/UL (ref 4.2–5.4)
RBC # BLD AUTO: 3.32 M/UL (ref 4.2–5.4)
RBC # BLD AUTO: 3.32 M/UL (ref 4.2–5.4)
RBC # BLD AUTO: 3.35 M/UL (ref 4.2–5.4)
RBC # BLD AUTO: 3.4 M/UL (ref 4.2–5.4)
RBC # BLD AUTO: 3.41 M/UL (ref 4.2–5.4)
RBC # BLD AUTO: 3.43 M/UL (ref 4.2–5.4)
RBC # BLD AUTO: 3.49 M/UL (ref 4.2–5.4)
RBC # BLD AUTO: 3.54 M/UL (ref 4.2–5.4)
RBC # BLD AUTO: 3.64 M/UL (ref 4.2–5.4)
RBC # BLD AUTO: 3.71 M/UL (ref 4.2–5.4)
RBC # BLD AUTO: 3.71 M/UL (ref 4.2–5.4)
RBC # BLD AUTO: 3.74 M/UL (ref 4.2–5.4)
RBC # BLD AUTO: 3.76 M/UL (ref 4.2–5.4)
RBC # BLD AUTO: 3.76 M/UL (ref 4.2–5.4)
RBC # BLD AUTO: 3.92 M/UL (ref 4.2–5.4)
RBC # BLD AUTO: 3.93 M/UL (ref 4.2–5.4)
RBC # BLD AUTO: 3.96 M/UL (ref 4.2–5.4)
RBC # BLD AUTO: 4.01 M/UL (ref 4.2–5.4)
RBC # BLD AUTO: 4.02 M/UL (ref 4.2–5.4)
RBC # BLD AUTO: 4.05 M/UL (ref 4.2–5.4)
RBC # BLD AUTO: 4.05 M/UL (ref 4.2–5.4)
RBC # BLD AUTO: 4.09 M/UL (ref 4.2–5.4)
RBC # BLD AUTO: 4.14 M/UL (ref 4.2–5.4)
RBC # BLD AUTO: 4.2 M/UL (ref 4.2–5.4)
RBC # BLD AUTO: 4.37 M/UL (ref 4.2–5.4)
RBC # BLD AUTO: 4.4 M/UL (ref 4.2–5.4)
RBC # BLD AUTO: 4.42 M/UL (ref 4.2–5.4)
RBC # BLD AUTO: 4.45 M/UL (ref 4.2–5.4)
RBC # BLD AUTO: 4.7 M/UL (ref 4.2–5.4)
RBC # BLD AUTO: 5.29 M/UL (ref 4.2–5.4)
RBC # URNS HPF: ABNORMAL /HPF
RBC # URNS HPF: NORMAL /HPF
RBC BLD AUTO: PRESENT
RBC UR QL AUTO: ABNORMAL
RBC UR QL AUTO: ABNORMAL
RBC UR QL AUTO: NEGATIVE
RH BLD: NORMAL
RHODAMINE-AURAMINE STN SPEC: NORMAL
RHODAMINE-AURAMINE STN SPEC: NORMAL
RSV RNA SPEC QL NAA+PROBE: NEGATIVE
SALICYLATES SERPL-MCNC: <1 MG/DL (ref 15–25)
SAO2 % BLDA: 100 % (ref 93–99)
SAO2 % BLDA: 100 % (ref 93–99)
SAO2 % BLDA: 95 % (ref 93–99)
SAO2 % BLDA: 99 % (ref 93–99)
SAO2 % BLDV: 72 %
SAO2 % BLDV: 78 %
SARS-COV+SARS-COV-2 AG RESP QL IA.RAPID: NOTDETECTED
SARS-COV+SARS-COV-2 AG RESP QL IA.RAPID: NOTDETECTED
SARS-COV-2 RNA RESP QL NAA+PROBE: NOTDETECTED
SCCMEC + MECA PNL NOSE NAA+PROBE: NEGATIVE
SIGNIFICANT IND 70042: ABNORMAL
SIGNIFICANT IND 70042: ABNORMAL
SIGNIFICANT IND 70042: NORMAL
SITE SITE: ABNORMAL
SITE SITE: ABNORMAL
SITE SITE: NORMAL
SODIUM BLD-SCNC: 140 MMOL/L (ref 135–145)
SODIUM SERPL-SCNC: 124 MMOL/L (ref 135–145)
SODIUM SERPL-SCNC: 127 MMOL/L (ref 135–145)
SODIUM SERPL-SCNC: 132 MMOL/L (ref 135–145)
SODIUM SERPL-SCNC: 132 MMOL/L (ref 135–145)
SODIUM SERPL-SCNC: 133 MMOL/L (ref 135–145)
SODIUM SERPL-SCNC: 134 MMOL/L (ref 135–145)
SODIUM SERPL-SCNC: 135 MMOL/L (ref 135–145)
SODIUM SERPL-SCNC: 136 MMOL/L (ref 135–145)
SODIUM SERPL-SCNC: 137 MMOL/L (ref 135–145)
SODIUM SERPL-SCNC: 138 MMOL/L (ref 135–145)
SODIUM SERPL-SCNC: 139 MMOL/L (ref 135–145)
SODIUM SERPL-SCNC: 140 MMOL/L (ref 135–145)
SODIUM SERPL-SCNC: 142 MMOL/L (ref 135–145)
SODIUM SERPL-SCNC: 143 MMOL/L (ref 135–145)
SODIUM SERPL-SCNC: 144 MMOL/L (ref 135–145)
SODIUM SERPL-SCNC: 145 MMOL/L (ref 135–145)
SODIUM SERPL-SCNC: 146 MMOL/L (ref 135–145)
SODIUM SERPL-SCNC: 146 MMOL/L (ref 135–145)
SODIUM SERPL-SCNC: 147 MMOL/L (ref 135–145)
SODIUM SERPL-SCNC: 148 MMOL/L (ref 135–145)
SODIUM SERPL-SCNC: 149 MMOL/L (ref 135–145)
SODIUM SERPL-SCNC: 150 MMOL/L (ref 135–145)
SODIUM SERPL-SCNC: 152 MMOL/L (ref 135–145)
SODIUM SERPL-SCNC: 156 MMOL/L (ref 135–145)
SODIUM SERPL-SCNC: 157 MMOL/L (ref 135–145)
SODIUM SERPL-SCNC: 157 MMOL/L (ref 135–145)
SODIUM SERPL-SCNC: 159 MMOL/L (ref 135–145)
SODIUM SERPL-SCNC: 159 MMOL/L (ref 135–145)
SODIUM SERPL-SCNC: 160 MMOL/L (ref 135–145)
SODIUM SERPL-SCNC: 160 MMOL/L (ref 135–145)
SOURCE SOURCE: ABNORMAL
SOURCE SOURCE: ABNORMAL
SOURCE SOURCE: NORMAL
SP GR UR STRIP.AUTO: 1.01
SP GR UR STRIP.AUTO: 1.02
SP GR UR STRIP.AUTO: 1.02
SP GR UR STRIP.AUTO: 1.03
SP GR UR STRIP.AUTO: >=1.03
SPECIMEN DRAWN FROM PATIENT: ABNORMAL
SPECIMEN SOURCE: NORMAL
T4 FREE SERPL-MCNC: 0.93 NG/DL (ref 0.93–1.7)
T4 FREE SERPL-MCNC: 0.98 NG/DL (ref 0.93–1.7)
T4 FREE SERPL-MCNC: 1.04 NG/DL (ref 0.93–1.7)
T4 FREE SERPL-MCNC: 1.06 NG/DL (ref 0.93–1.7)
T4 FREE SERPL-MCNC: 1.14 NG/DL (ref 0.93–1.7)
TEG ALGORITHM TGALG: ABNORMAL
TIBC SERPL-MCNC: 232 UG/DL (ref 250–450)
TIDAL VOLUME IVT: 400 ML
TRANS CELLS URNS QL MICRO: ABNORMAL /HPF
TROPONIN T SERPL-MCNC: 46 NG/L (ref 6–19)
TROPONIN T SERPL-MCNC: 50 NG/L (ref 6–19)
TSH SERPL DL<=0.005 MIU/L-ACNC: 0.59 UIU/ML (ref 0.38–5.33)
TSH SERPL DL<=0.005 MIU/L-ACNC: 0.8 UIU/ML (ref 0.38–5.33)
TSH SERPL DL<=0.005 MIU/L-ACNC: 0.95 UIU/ML (ref 0.38–5.33)
TSH SERPL DL<=0.005 MIU/L-ACNC: 1.05 UIU/ML (ref 0.38–5.33)
TSH SERPL DL<=0.005 MIU/L-ACNC: 1.15 UIU/ML (ref 0.38–5.33)
TSH SERPL DL<=0.005 MIU/L-ACNC: 1.2 UIU/ML (ref 0.38–5.33)
TSH SERPL DL<=0.005 MIU/L-ACNC: 2.32 UIU/ML (ref 0.38–5.33)
UIBC SERPL-MCNC: 130 UG/DL (ref 110–370)
UNIDENT CRYS URNS QL MICRO: ABNORMAL /HPF
UNIT STATUS USTAT: NORMAL
UNIT STATUS USTAT: NORMAL
UROBILINOGEN UR STRIP.AUTO-MCNC: 0.2 MG/DL
WBC # BLD AUTO: 1.9 K/UL (ref 4.8–10.8)
WBC # BLD AUTO: 10.6 K/UL (ref 4.8–10.8)
WBC # BLD AUTO: 11.3 K/UL (ref 4.8–10.8)
WBC # BLD AUTO: 11.4 K/UL (ref 4.8–10.8)
WBC # BLD AUTO: 11.5 K/UL (ref 4.8–10.8)
WBC # BLD AUTO: 12.9 K/UL (ref 4.8–10.8)
WBC # BLD AUTO: 13.5 K/UL (ref 4.8–10.8)
WBC # BLD AUTO: 13.9 K/UL (ref 4.8–10.8)
WBC # BLD AUTO: 15.1 K/UL (ref 4.8–10.8)
WBC # BLD AUTO: 16 K/UL (ref 4.8–10.8)
WBC # BLD AUTO: 17.2 K/UL (ref 4.8–10.8)
WBC # BLD AUTO: 17.6 K/UL (ref 4.8–10.8)
WBC # BLD AUTO: 17.6 K/UL (ref 4.8–10.8)
WBC # BLD AUTO: 17.7 K/UL (ref 4.8–10.8)
WBC # BLD AUTO: 17.9 K/UL (ref 4.8–10.8)
WBC # BLD AUTO: 17.9 K/UL (ref 4.8–10.8)
WBC # BLD AUTO: 18.4 K/UL (ref 4.8–10.8)
WBC # BLD AUTO: 18.6 K/UL (ref 4.8–10.8)
WBC # BLD AUTO: 19.4 K/UL (ref 4.8–10.8)
WBC # BLD AUTO: 2.7 K/UL (ref 4.8–10.8)
WBC # BLD AUTO: 2.9 K/UL (ref 4.8–10.8)
WBC # BLD AUTO: 20.9 K/UL (ref 4.8–10.8)
WBC # BLD AUTO: 21.9 K/UL (ref 4.8–10.8)
WBC # BLD AUTO: 22.7 K/UL (ref 4.8–10.8)
WBC # BLD AUTO: 23.3 K/UL (ref 4.8–10.8)
WBC # BLD AUTO: 24.7 K/UL (ref 4.8–10.8)
WBC # BLD AUTO: 25.1 K/UL (ref 4.8–10.8)
WBC # BLD AUTO: 3 K/UL (ref 4.8–10.8)
WBC # BLD AUTO: 3.2 K/UL (ref 4.8–10.8)
WBC # BLD AUTO: 3.2 K/UL (ref 4.8–10.8)
WBC # BLD AUTO: 3.3 K/UL (ref 4.8–10.8)
WBC # BLD AUTO: 3.4 K/UL (ref 4.8–10.8)
WBC # BLD AUTO: 3.9 K/UL (ref 4.8–10.8)
WBC # BLD AUTO: 4.5 K/UL (ref 4.8–10.8)
WBC # BLD AUTO: 5.7 K/UL (ref 4.8–10.8)
WBC # BLD AUTO: 5.9 K/UL (ref 4.8–10.8)
WBC # BLD AUTO: 6 K/UL (ref 4.8–10.8)
WBC # BLD AUTO: 6.5 K/UL (ref 4.8–10.8)
WBC # BLD AUTO: 6.5 K/UL (ref 4.8–10.8)
WBC # BLD AUTO: 6.6 K/UL (ref 4.8–10.8)
WBC # BLD AUTO: 6.7 K/UL (ref 4.8–10.8)
WBC # BLD AUTO: 7 K/UL (ref 4.8–10.8)
WBC # BLD AUTO: 7.2 K/UL (ref 4.8–10.8)
WBC # BLD AUTO: 7.4 K/UL (ref 4.8–10.8)
WBC # BLD AUTO: 7.7 K/UL (ref 4.8–10.8)
WBC # BLD AUTO: 7.8 K/UL (ref 4.8–10.8)
WBC # BLD AUTO: 8.6 K/UL (ref 4.8–10.8)
WBC #/AREA URNS HPF: ABNORMAL /HPF
WBC #/AREA URNS HPF: NORMAL /HPF
WBC OTHER NFR FLD: 31 %

## 2022-01-01 PROCEDURE — 82140 ASSAY OF AMMONIA: CPT

## 2022-01-01 PROCEDURE — 700111 HCHG RX REV CODE 636 W/ 250 OVERRIDE (IP): Performed by: INTERNAL MEDICINE

## 2022-01-01 PROCEDURE — 83605 ASSAY OF LACTIC ACID: CPT | Mod: 91

## 2022-01-01 PROCEDURE — 700105 HCHG RX REV CODE 258: Performed by: STUDENT IN AN ORGANIZED HEALTH CARE EDUCATION/TRAINING PROGRAM

## 2022-01-01 PROCEDURE — 99253 IP/OBS CNSLTJ NEW/EST LOW 45: CPT | Performed by: INTERNAL MEDICINE

## 2022-01-01 PROCEDURE — 96367 TX/PROPH/DG ADDL SEQ IV INF: CPT

## 2022-01-01 PROCEDURE — 700111 HCHG RX REV CODE 636 W/ 250 OVERRIDE (IP): Performed by: STUDENT IN AN ORGANIZED HEALTH CARE EDUCATION/TRAINING PROGRAM

## 2022-01-01 PROCEDURE — 700111 HCHG RX REV CODE 636 W/ 250 OVERRIDE (IP): Performed by: HOSPITALIST

## 2022-01-01 PROCEDURE — 85025 COMPLETE CBC W/AUTO DIFF WBC: CPT

## 2022-01-01 PROCEDURE — 700111 HCHG RX REV CODE 636 W/ 250 OVERRIDE (IP)

## 2022-01-01 PROCEDURE — 83735 ASSAY OF MAGNESIUM: CPT

## 2022-01-01 PROCEDURE — 96413 CHEMO IV INFUSION 1 HR: CPT

## 2022-01-01 PROCEDURE — A9270 NON-COVERED ITEM OR SERVICE: HCPCS | Performed by: HOSPITALIST

## 2022-01-01 PROCEDURE — 700102 HCHG RX REV CODE 250 W/ 637 OVERRIDE(OP): Performed by: HOSPITALIST

## 2022-01-01 PROCEDURE — 0DB88ZX EXCISION OF SMALL INTESTINE, VIA NATURAL OR ARTIFICIAL OPENING ENDOSCOPIC, DIAGNOSTIC: ICD-10-PCS | Performed by: INTERNAL MEDICINE

## 2022-01-01 PROCEDURE — 86850 RBC ANTIBODY SCREEN: CPT

## 2022-01-01 PROCEDURE — A9270 NON-COVERED ITEM OR SERVICE: HCPCS | Performed by: INTERNAL MEDICINE

## 2022-01-01 PROCEDURE — 82962 GLUCOSE BLOOD TEST: CPT

## 2022-01-01 PROCEDURE — 160002 HCHG RECOVERY MINUTES (STAT): Performed by: INTERNAL MEDICINE

## 2022-01-01 PROCEDURE — 80048 BASIC METABOLIC PNL TOTAL CA: CPT

## 2022-01-01 PROCEDURE — 0B9F8ZX DRAINAGE OF RIGHT LOWER LUNG LOBE, VIA NATURAL OR ARTIFICIAL OPENING ENDOSCOPIC, DIAGNOSTIC: ICD-10-PCS | Performed by: INTERNAL MEDICINE

## 2022-01-01 PROCEDURE — 99232 SBSQ HOSP IP/OBS MODERATE 35: CPT | Performed by: INTERNAL MEDICINE

## 2022-01-01 PROCEDURE — 700102 HCHG RX REV CODE 250 W/ 637 OVERRIDE(OP): Performed by: INTERNAL MEDICINE

## 2022-01-01 PROCEDURE — 80053 COMPREHEN METABOLIC PANEL: CPT

## 2022-01-01 PROCEDURE — A9270 NON-COVERED ITEM OR SERVICE: HCPCS

## 2022-01-01 PROCEDURE — 700102 HCHG RX REV CODE 250 W/ 637 OVERRIDE(OP): Performed by: NURSE PRACTITIONER

## 2022-01-01 PROCEDURE — 84100 ASSAY OF PHOSPHORUS: CPT

## 2022-01-01 PROCEDURE — 302106 OSTOMY POWDER: Performed by: HOSPITALIST

## 2022-01-01 PROCEDURE — 96375 TX/PRO/DX INJ NEW DRUG ADDON: CPT

## 2022-01-01 PROCEDURE — 99233 SBSQ HOSP IP/OBS HIGH 50: CPT | Performed by: HOSPITALIST

## 2022-01-01 PROCEDURE — 700111 HCHG RX REV CODE 636 W/ 250 OVERRIDE (IP): Performed by: EMERGENCY MEDICINE

## 2022-01-01 PROCEDURE — 82565 ASSAY OF CREATININE: CPT

## 2022-01-01 PROCEDURE — 99232 SBSQ HOSP IP/OBS MODERATE 35: CPT | Performed by: STUDENT IN AN ORGANIZED HEALTH CARE EDUCATION/TRAINING PROGRAM

## 2022-01-01 PROCEDURE — 36415 COLL VENOUS BLD VENIPUNCTURE: CPT

## 2022-01-01 PROCEDURE — 99223 1ST HOSP IP/OBS HIGH 75: CPT | Performed by: INTERNAL MEDICINE

## 2022-01-01 PROCEDURE — 700101 HCHG RX REV CODE 250: Performed by: EMERGENCY MEDICINE

## 2022-01-01 PROCEDURE — 94760 N-INVAS EAR/PLS OXIMETRY 1: CPT

## 2022-01-01 PROCEDURE — 93005 ELECTROCARDIOGRAM TRACING: CPT | Performed by: STUDENT IN AN ORGANIZED HEALTH CARE EDUCATION/TRAINING PROGRAM

## 2022-01-01 PROCEDURE — 36600 WITHDRAWAL OF ARTERIAL BLOOD: CPT

## 2022-01-01 PROCEDURE — 92523 SPEECH SOUND LANG COMPREHEN: CPT

## 2022-01-01 PROCEDURE — 97167 OT EVAL HIGH COMPLEX 60 MIN: CPT

## 2022-01-01 PROCEDURE — 99212 OFFICE O/P EST SF 10 MIN: CPT | Performed by: NURSE PRACTITIONER

## 2022-01-01 PROCEDURE — G0299 HHS/HOSPICE OF RN EA 15 MIN: HCPCS

## 2022-01-01 PROCEDURE — 700105 HCHG RX REV CODE 258: Performed by: EMERGENCY MEDICINE

## 2022-01-01 PROCEDURE — 85027 COMPLETE CBC AUTOMATED: CPT

## 2022-01-01 PROCEDURE — 700105 HCHG RX REV CODE 258: Performed by: HOSPITALIST

## 2022-01-01 PROCEDURE — 87116 MYCOBACTERIA CULTURE: CPT

## 2022-01-01 PROCEDURE — A9270 NON-COVERED ITEM OR SERVICE: HCPCS | Performed by: STUDENT IN AN ORGANIZED HEALTH CARE EDUCATION/TRAINING PROGRAM

## 2022-01-01 PROCEDURE — 85018 HEMOGLOBIN: CPT

## 2022-01-01 PROCEDURE — 97535 SELF CARE MNGMENT TRAINING: CPT | Mod: CO

## 2022-01-01 PROCEDURE — 80179 DRUG ASSAY SALICYLATE: CPT

## 2022-01-01 PROCEDURE — 700105 HCHG RX REV CODE 258: Performed by: INTERNAL MEDICINE

## 2022-01-01 PROCEDURE — T2045 HOSPICE GENERAL CARE: HCPCS

## 2022-01-01 PROCEDURE — 302307 BAG FECAL MANAGEMENT TEMPORARY COLLECTION WITH FILTER - SEAL SIGNAL FMS: Performed by: HOSPITALIST

## 2022-01-01 PROCEDURE — 84145 PROCALCITONIN (PCT): CPT

## 2022-01-01 PROCEDURE — 93005 ELECTROCARDIOGRAM TRACING: CPT

## 2022-01-01 PROCEDURE — 87070 CULTURE OTHR SPECIMN AEROBIC: CPT

## 2022-01-01 PROCEDURE — 82077 ASSAY SPEC XCP UR&BREATH IA: CPT

## 2022-01-01 PROCEDURE — 770004 HCHG ROOM/CARE - ONCOLOGY PRIVATE *

## 2022-01-01 PROCEDURE — 84146 ASSAY OF PROLACTIN: CPT

## 2022-01-01 PROCEDURE — 770022 HCHG ROOM/CARE - ICU (200)

## 2022-01-01 PROCEDURE — 94799 UNLISTED PULMONARY SVC/PX: CPT

## 2022-01-01 PROCEDURE — 665035 HSPC ROOM AND BOARD PER DIEM

## 2022-01-01 PROCEDURE — 84295 ASSAY OF SERUM SODIUM: CPT

## 2022-01-01 PROCEDURE — 82105 ALPHA-FETOPROTEIN SERUM: CPT

## 2022-01-01 PROCEDURE — 99291 CRITICAL CARE FIRST HOUR: CPT | Mod: 25 | Performed by: INTERNAL MEDICINE

## 2022-01-01 PROCEDURE — 160048 HCHG OR STATISTICAL LEVEL 1-5: Performed by: INTERNAL MEDICINE

## 2022-01-01 PROCEDURE — 43239 EGD BIOPSY SINGLE/MULTIPLE: CPT | Performed by: INTERNAL MEDICINE

## 2022-01-01 PROCEDURE — 88312 SPECIAL STAINS GROUP 1: CPT

## 2022-01-01 PROCEDURE — 83690 ASSAY OF LIPASE: CPT

## 2022-01-01 PROCEDURE — 0DBB8ZX EXCISION OF ILEUM, VIA NATURAL OR ARTIFICIAL OPENING ENDOSCOPIC, DIAGNOSTIC: ICD-10-PCS | Performed by: INTERNAL MEDICINE

## 2022-01-01 PROCEDURE — 306580 SET INFUSION,POWERLOC 20G X.75: Performed by: HOSPITALIST

## 2022-01-01 PROCEDURE — 87641 MR-STAPH DNA AMP PROBE: CPT

## 2022-01-01 PROCEDURE — 770006 HCHG ROOM/CARE - MED/SURG/GYN SEMI*

## 2022-01-01 PROCEDURE — A9270 NON-COVERED ITEM OR SERVICE: HCPCS | Performed by: EMERGENCY MEDICINE

## 2022-01-01 PROCEDURE — 700102 HCHG RX REV CODE 250 W/ 637 OVERRIDE(OP): Performed by: STUDENT IN AN ORGANIZED HEALTH CARE EDUCATION/TRAINING PROGRAM

## 2022-01-01 PROCEDURE — A9270 NON-COVERED ITEM OR SERVICE: HCPCS | Performed by: ANESTHESIOLOGY

## 2022-01-01 PROCEDURE — 770020 HCHG ROOM/CARE - TELE (206)

## 2022-01-01 PROCEDURE — 92526 ORAL FUNCTION THERAPY: CPT

## 2022-01-01 PROCEDURE — 86901 BLOOD TYPING SEROLOGIC RH(D): CPT

## 2022-01-01 PROCEDURE — 84439 ASSAY OF FREE THYROXINE: CPT

## 2022-01-01 PROCEDURE — 85018 HEMOGLOBIN: CPT | Mod: 91

## 2022-01-01 PROCEDURE — 87522 HEPATITIS C REVRS TRNSCRPJ: CPT

## 2022-01-01 PROCEDURE — 82272 OCCULT BLD FECES 1-3 TESTS: CPT

## 2022-01-01 PROCEDURE — 88305 TISSUE EXAM BY PATHOLOGIST: CPT | Mod: 59

## 2022-01-01 PROCEDURE — 89240 UNLISTED MISC PATH TEST: CPT

## 2022-01-01 PROCEDURE — 84443 ASSAY THYROID STIM HORMONE: CPT

## 2022-01-01 PROCEDURE — 700111 HCHG RX REV CODE 636 W/ 250 OVERRIDE (IP): Performed by: NURSE PRACTITIONER

## 2022-01-01 PROCEDURE — 99233 SBSQ HOSP IP/OBS HIGH 50: CPT | Performed by: INTERNAL MEDICINE

## 2022-01-01 PROCEDURE — 700111 HCHG RX REV CODE 636 W/ 250 OVERRIDE (IP): Performed by: RADIOLOGY

## 2022-01-01 PROCEDURE — C9803 HOPD COVID-19 SPEC COLLECT: HCPCS | Performed by: EMERGENCY MEDICINE

## 2022-01-01 PROCEDURE — 0241U HCHG SARS-COV-2 COVID-19 NFCT DS RESP RNA 4 TRGT MIC: CPT

## 2022-01-01 PROCEDURE — 71045 X-RAY EXAM CHEST 1 VIEW: CPT

## 2022-01-01 PROCEDURE — 85652 RBC SED RATE AUTOMATED: CPT

## 2022-01-01 PROCEDURE — 160203 HCHG ENDO MINUTES - 1ST 30 MINS LEVEL 4: Performed by: INTERNAL MEDICINE

## 2022-01-01 PROCEDURE — 83010 ASSAY OF HAPTOGLOBIN QUANT: CPT

## 2022-01-01 PROCEDURE — A9270 NON-COVERED ITEM OR SERVICE: HCPCS | Performed by: RADIOLOGY

## 2022-01-01 PROCEDURE — 93010 ELECTROCARDIOGRAM REPORT: CPT | Performed by: INTERNAL MEDICINE

## 2022-01-01 PROCEDURE — A4212 NON CORING NEEDLE OR STYLET: HCPCS

## 2022-01-01 PROCEDURE — 99232 SBSQ HOSP IP/OBS MODERATE 35: CPT | Performed by: HOSPITALIST

## 2022-01-01 PROCEDURE — 96365 THER/PROPH/DIAG IV INF INIT: CPT

## 2022-01-01 PROCEDURE — 84075 ASSAY ALKALINE PHOSPHATASE: CPT

## 2022-01-01 PROCEDURE — 160035 HCHG PACU - 1ST 60 MINS PHASE I

## 2022-01-01 PROCEDURE — 97530 THERAPEUTIC ACTIVITIES: CPT

## 2022-01-01 PROCEDURE — 30233N1 TRANSFUSION OF NONAUTOLOGOUS RED BLOOD CELLS INTO PERIPHERAL VEIN, PERCUTANEOUS APPROACH: ICD-10-PCS | Performed by: EMERGENCY MEDICINE

## 2022-01-01 PROCEDURE — 84484 ASSAY OF TROPONIN QUANT: CPT

## 2022-01-01 PROCEDURE — 770001 HCHG ROOM/CARE - MED/SURG/GYN PRIV*

## 2022-01-01 PROCEDURE — 700102 HCHG RX REV CODE 250 W/ 637 OVERRIDE(OP)

## 2022-01-01 PROCEDURE — 700105 HCHG RX REV CODE 258: Performed by: NURSE PRACTITIONER

## 2022-01-01 PROCEDURE — 87493 C DIFF AMPLIFIED PROBE: CPT

## 2022-01-01 PROCEDURE — 80076 HEPATIC FUNCTION PANEL: CPT

## 2022-01-01 PROCEDURE — 84132 ASSAY OF SERUM POTASSIUM: CPT

## 2022-01-01 PROCEDURE — 0B9D8ZX DRAINAGE OF RIGHT MIDDLE LUNG LOBE, VIA NATURAL OR ARTIFICIAL OPENING ENDOSCOPIC, DIAGNOSTIC: ICD-10-PCS | Performed by: INTERNAL MEDICINE

## 2022-01-01 PROCEDURE — G0155 HHCP-SVS OF CSW,EA 15 MIN: HCPCS

## 2022-01-01 PROCEDURE — 70553 MRI BRAIN STEM W/O & W/DYE: CPT

## 2022-01-01 PROCEDURE — 99239 HOSP IP/OBS DSCHRG MGMT >30: CPT | Performed by: HOSPITALIST

## 2022-01-01 PROCEDURE — 302978 HCHG BRONCHOSCOPY-DIAGNOSTIC

## 2022-01-01 PROCEDURE — 94003 VENT MGMT INPAT SUBQ DAY: CPT

## 2022-01-01 PROCEDURE — 73562 X-RAY EXAM OF KNEE 3: CPT | Mod: LT

## 2022-01-01 PROCEDURE — 97530 THERAPEUTIC ACTIVITIES: CPT | Mod: CO

## 2022-01-01 PROCEDURE — 700117 HCHG RX CONTRAST REV CODE 255: Performed by: INTERNAL MEDICINE

## 2022-01-01 PROCEDURE — 51798 US URINE CAPACITY MEASURE: CPT

## 2022-01-01 PROCEDURE — 80048 BASIC METABOLIC PNL TOTAL CA: CPT | Mod: 91

## 2022-01-01 PROCEDURE — 85347 COAGULATION TIME ACTIVATED: CPT

## 2022-01-01 PROCEDURE — 303105 HCHG CATHETER EXTRA

## 2022-01-01 PROCEDURE — 87086 URINE CULTURE/COLONY COUNT: CPT

## 2022-01-01 PROCEDURE — 99285 EMERGENCY DEPT VISIT HI MDM: CPT

## 2022-01-01 PROCEDURE — 32551 INSERTION OF CHEST TUBE: CPT | Mod: LT

## 2022-01-01 PROCEDURE — 96417 CHEMO IV INFUS EACH ADDL SEQ: CPT

## 2022-01-01 PROCEDURE — 81001 URINALYSIS AUTO W/SCOPE: CPT

## 2022-01-01 PROCEDURE — 83550 IRON BINDING TEST: CPT

## 2022-01-01 PROCEDURE — 99232 SBSQ HOSP IP/OBS MODERATE 35: CPT | Mod: 25 | Performed by: INTERNAL MEDICINE

## 2022-01-01 PROCEDURE — 87324 CLOSTRIDIUM AG IA: CPT

## 2022-01-01 PROCEDURE — 70491 CT SOFT TISSUE NECK W/DYE: CPT

## 2022-01-01 PROCEDURE — 87077 CULTURE AEROBIC IDENTIFY: CPT

## 2022-01-01 PROCEDURE — 82378 CARCINOEMBRYONIC ANTIGEN: CPT

## 2022-01-01 PROCEDURE — C9113 INJ PANTOPRAZOLE SODIUM, VIA: HCPCS | Performed by: STUDENT IN AN ORGANIZED HEALTH CARE EDUCATION/TRAINING PROGRAM

## 2022-01-01 PROCEDURE — 87040 BLOOD CULTURE FOR BACTERIA: CPT

## 2022-01-01 PROCEDURE — C9113 INJ PANTOPRAZOLE SODIUM, VIA: HCPCS | Performed by: HOSPITALIST

## 2022-01-01 PROCEDURE — 85384 FIBRINOGEN ACTIVITY: CPT

## 2022-01-01 PROCEDURE — 97530 THERAPEUTIC ACTIVITIES: CPT | Mod: CQ

## 2022-01-01 PROCEDURE — 700105 HCHG RX REV CODE 258

## 2022-01-01 PROCEDURE — 99212 OFFICE O/P EST SF 10 MIN: CPT | Performed by: INTERNAL MEDICINE

## 2022-01-01 PROCEDURE — 87102 FUNGUS ISOLATION CULTURE: CPT

## 2022-01-01 PROCEDURE — 88305 TISSUE EXAM BY PATHOLOGIST: CPT

## 2022-01-01 PROCEDURE — 87899 AGENT NOS ASSAY W/OPTIC: CPT

## 2022-01-01 PROCEDURE — 85730 THROMBOPLASTIN TIME PARTIAL: CPT

## 2022-01-01 PROCEDURE — 700117 HCHG RX CONTRAST REV CODE 255: Performed by: HOSPITALIST

## 2022-01-01 PROCEDURE — 99140 ANES COMP EMERGENCY COND: CPT | Performed by: STUDENT IN AN ORGANIZED HEALTH CARE EDUCATION/TRAINING PROGRAM

## 2022-01-01 PROCEDURE — 0DB68ZX EXCISION OF STOMACH, VIA NATURAL OR ARTIFICIAL OPENING ENDOSCOPIC, DIAGNOSTIC: ICD-10-PCS | Performed by: INTERNAL MEDICINE

## 2022-01-01 PROCEDURE — 99239 HOSP IP/OBS DSCHRG MGMT >30: CPT | Performed by: INTERNAL MEDICINE

## 2022-01-01 PROCEDURE — 87040 BLOOD CULTURE FOR BACTERIA: CPT | Mod: 91

## 2022-01-01 PROCEDURE — 97163 PT EVAL HIGH COMPLEX 45 MIN: CPT

## 2022-01-01 PROCEDURE — 86300 IMMUNOASSAY TUMOR CA 15-3: CPT

## 2022-01-01 PROCEDURE — 82533 TOTAL CORTISOL: CPT

## 2022-01-01 PROCEDURE — 74160 CT ABDOMEN W/CONTRAST: CPT

## 2022-01-01 PROCEDURE — 51702 INSERT TEMP BLADDER CATH: CPT

## 2022-01-01 PROCEDURE — 700101 HCHG RX REV CODE 250

## 2022-01-01 PROCEDURE — 31645 BRNCHSC W/THER ASPIR 1ST: CPT | Performed by: INTERNAL MEDICINE

## 2022-01-01 PROCEDURE — 0DB98ZX EXCISION OF DUODENUM, VIA NATURAL OR ARTIFICIAL OPENING ENDOSCOPIC, DIAGNOSTIC: ICD-10-PCS | Performed by: INTERNAL MEDICINE

## 2022-01-01 PROCEDURE — 302214 INTUBATION BOX: Performed by: EMERGENCY MEDICINE

## 2022-01-01 PROCEDURE — A9270 NON-COVERED ITEM OR SERVICE: HCPCS | Performed by: NURSE PRACTITIONER

## 2022-01-01 PROCEDURE — 99291 CRITICAL CARE FIRST HOUR: CPT | Performed by: INTERNAL MEDICINE

## 2022-01-01 PROCEDURE — 0DJ08ZZ INSPECTION OF UPPER INTESTINAL TRACT, VIA NATURAL OR ARTIFICIAL OPENING ENDOSCOPIC: ICD-10-PCS | Performed by: INTERNAL MEDICINE

## 2022-01-01 PROCEDURE — 85014 HEMATOCRIT: CPT

## 2022-01-01 PROCEDURE — 160035 HCHG PACU - 1ST 60 MINS PHASE I: Performed by: INTERNAL MEDICINE

## 2022-01-01 PROCEDURE — 700101 HCHG RX REV CODE 250: Performed by: HOSPITALIST

## 2022-01-01 PROCEDURE — 87147 CULTURE TYPE IMMUNOLOGIC: CPT

## 2022-01-01 PROCEDURE — 0DBG8ZX EXCISION OF LEFT LARGE INTESTINE, VIA NATURAL OR ARTIFICIAL OPENING ENDOSCOPIC, DIAGNOSTIC: ICD-10-PCS | Performed by: INTERNAL MEDICINE

## 2022-01-01 PROCEDURE — P9016 RBC LEUKOCYTES REDUCED: HCPCS | Mod: 91

## 2022-01-01 PROCEDURE — 00731 ANES UPR GI NDSC PX NOS: CPT | Performed by: STUDENT IN AN ORGANIZED HEALTH CARE EDUCATION/TRAINING PROGRAM

## 2022-01-01 PROCEDURE — 700101 HCHG RX REV CODE 250: Performed by: INTERNAL MEDICINE

## 2022-01-01 PROCEDURE — 87206 SMEAR FLUORESCENT/ACID STAI: CPT

## 2022-01-01 PROCEDURE — 36430 TRANSFUSION BLD/BLD COMPNT: CPT

## 2022-01-01 PROCEDURE — 700102 HCHG RX REV CODE 250 W/ 637 OVERRIDE(OP): Performed by: ANESTHESIOLOGY

## 2022-01-01 PROCEDURE — 93306 TTE W/DOPPLER COMPLETE: CPT

## 2022-01-01 PROCEDURE — 700111 HCHG RX REV CODE 636 W/ 250 OVERRIDE (IP): Performed by: ANESTHESIOLOGY

## 2022-01-01 PROCEDURE — 82803 BLOOD GASES ANY COMBINATION: CPT

## 2022-01-01 PROCEDURE — A9576 INJ PROHANCE MULTIPACK: HCPCS | Performed by: HOSPITALIST

## 2022-01-01 PROCEDURE — 82330 ASSAY OF CALCIUM: CPT

## 2022-01-01 PROCEDURE — 96374 THER/PROPH/DIAG INJ IV PUSH: CPT

## 2022-01-01 PROCEDURE — 99153 MOD SED SAME PHYS/QHP EA: CPT

## 2022-01-01 PROCEDURE — 82977 ASSAY OF GGT: CPT

## 2022-01-01 PROCEDURE — 88341 IMHCHEM/IMCYTCHM EA ADD ANTB: CPT | Mod: XU

## 2022-01-01 PROCEDURE — 70547 MR ANGIOGRAPHY NECK W/O DYE: CPT

## 2022-01-01 PROCEDURE — 86140 C-REACTIVE PROTEIN: CPT

## 2022-01-01 PROCEDURE — 99291 CRITICAL CARE FIRST HOUR: CPT

## 2022-01-01 PROCEDURE — 94150 VITAL CAPACITY TEST: CPT

## 2022-01-01 PROCEDURE — 71260 CT THORAX DX C+: CPT

## 2022-01-01 PROCEDURE — 99214 OFFICE O/P EST MOD 30 MIN: CPT | Performed by: NURSE PRACTITIONER

## 2022-01-01 PROCEDURE — 700101 HCHG RX REV CODE 250: Performed by: STUDENT IN AN ORGANIZED HEALTH CARE EDUCATION/TRAINING PROGRAM

## 2022-01-01 PROCEDURE — 306578 KIT,PORT ACCESS 20G X 1.5INCH: Performed by: INTERNAL MEDICINE

## 2022-01-01 PROCEDURE — 83630 LACTOFERRIN FECAL (QUAL): CPT

## 2022-01-01 PROCEDURE — 160002 HCHG RECOVERY MINUTES (STAT)

## 2022-01-01 PROCEDURE — 76705 ECHO EXAM OF ABDOMEN: CPT

## 2022-01-01 PROCEDURE — 73590 X-RAY EXAM OF LOWER LEG: CPT | Mod: LT

## 2022-01-01 PROCEDURE — 99233 SBSQ HOSP IP/OBS HIGH 50: CPT | Mod: 25 | Performed by: INTERNAL MEDICINE

## 2022-01-01 PROCEDURE — 31500 INSERT EMERGENCY AIRWAY: CPT

## 2022-01-01 PROCEDURE — 80069 RENAL FUNCTION PANEL: CPT

## 2022-01-01 PROCEDURE — 96368 THER/DIAG CONCURRENT INF: CPT

## 2022-01-01 PROCEDURE — 97535 SELF CARE MNGMENT TRAINING: CPT

## 2022-01-01 PROCEDURE — 0DBF8ZX EXCISION OF RIGHT LARGE INTESTINE, VIA NATURAL OR ARTIFICIAL OPENING ENDOSCOPIC, DIAGNOSTIC: ICD-10-PCS | Performed by: INTERNAL MEDICINE

## 2022-01-01 PROCEDURE — 85007 BL SMEAR W/DIFF WBC COUNT: CPT

## 2022-01-01 PROCEDURE — 96372 THER/PROPH/DIAG INJ SC/IM: CPT

## 2022-01-01 PROCEDURE — 99214 OFFICE O/P EST MOD 30 MIN: CPT | Performed by: INTERNAL MEDICINE

## 2022-01-01 PROCEDURE — 700105 HCHG RX REV CODE 258: Performed by: RADIOLOGY

## 2022-01-01 PROCEDURE — 84080 ASSAY ALKALINE PHOSPHATASES: CPT

## 2022-01-01 PROCEDURE — 87045 FECES CULTURE AEROBIC BACT: CPT

## 2022-01-01 PROCEDURE — 94002 VENT MGMT INPAT INIT DAY: CPT

## 2022-01-01 PROCEDURE — 88360 TUMOR IMMUNOHISTOCHEM/MANUAL: CPT

## 2022-01-01 PROCEDURE — 83993 ASSAY FOR CALPROTECTIN FECAL: CPT

## 2022-01-01 PROCEDURE — 302106 OSTOMY POWDER: Performed by: INTERNAL MEDICINE

## 2022-01-01 PROCEDURE — 99497 ADVNCD CARE PLAN 30 MIN: CPT | Performed by: INTERNAL MEDICINE

## 2022-01-01 PROCEDURE — 84134 ASSAY OF PREALBUMIN: CPT

## 2022-01-01 PROCEDURE — 95816 EEG AWAKE AND DROWSY: CPT | Mod: 26 | Performed by: PSYCHIATRY & NEUROLOGY

## 2022-01-01 PROCEDURE — 70450 CT HEAD/BRAIN W/O DYE: CPT

## 2022-01-01 PROCEDURE — 83615 LACTATE (LD) (LDH) ENZYME: CPT

## 2022-01-01 PROCEDURE — 99283 EMERGENCY DEPT VISIT LOW MDM: CPT

## 2022-01-01 PROCEDURE — 43235 EGD DIAGNOSTIC BRUSH WASH: CPT | Performed by: INTERNAL MEDICINE

## 2022-01-01 PROCEDURE — 71045 X-RAY EXAM CHEST 1 VIEW: CPT | Mod: XU

## 2022-01-01 PROCEDURE — 5A1945Z RESPIRATORY VENTILATION, 24-96 CONSECUTIVE HOURS: ICD-10-PCS | Performed by: EMERGENCY MEDICINE

## 2022-01-01 PROCEDURE — 92612 ENDOSCOPY SWALLOW (FEES) VID: CPT

## 2022-01-01 PROCEDURE — 74176 CT ABD & PELVIS W/O CONTRAST: CPT

## 2022-01-01 PROCEDURE — 85610 PROTHROMBIN TIME: CPT

## 2022-01-01 PROCEDURE — 160036 HCHG PACU - EA ADDL 30 MINS PHASE I

## 2022-01-01 PROCEDURE — 36556 INSERT NON-TUNNEL CV CATH: CPT

## 2022-01-01 PROCEDURE — 85576 BLOOD PLATELET AGGREGATION: CPT

## 2022-01-01 PROCEDURE — 306580 SET INFUSION,POWERLOC 20G X.75: Performed by: STUDENT IN AN ORGANIZED HEALTH CARE EDUCATION/TRAINING PROGRAM

## 2022-01-01 PROCEDURE — 87015 SPECIMEN INFECT AGNT CONCNTJ: CPT

## 2022-01-01 PROCEDURE — 306578 KIT,PORT ACCESS 20G X 1.5INCH: Performed by: HOSPITALIST

## 2022-01-01 PROCEDURE — 00731 ANES UPR GI NDSC PX NOS: CPT | Performed by: ANESTHESIOLOGY

## 2022-01-01 PROCEDURE — 02HV33Z INSERTION OF INFUSION DEVICE INTO SUPERIOR VENA CAVA, PERCUTANEOUS APPROACH: ICD-10-PCS | Performed by: EMERGENCY MEDICINE

## 2022-01-01 PROCEDURE — 92610 EVALUATE SWALLOWING FUNCTION: CPT

## 2022-01-01 PROCEDURE — 82803 BLOOD GASES ANY COMBINATION: CPT | Mod: 91

## 2022-01-01 PROCEDURE — 86923 COMPATIBILITY TEST ELECTRIC: CPT | Mod: 91

## 2022-01-01 PROCEDURE — 87186 SC STD MICRODIL/AGAR DIL: CPT

## 2022-01-01 PROCEDURE — 160009 HCHG ANES TIME/MIN: Performed by: INTERNAL MEDICINE

## 2022-01-01 PROCEDURE — 32557 INSERT CATH PLEURA W/ IMAGE: CPT | Mod: LT

## 2022-01-01 PROCEDURE — 88313 SPECIAL STAINS GROUP 2: CPT

## 2022-01-01 PROCEDURE — 71046 X-RAY EXAM CHEST 2 VIEWS: CPT

## 2022-01-01 PROCEDURE — 4A10X4Z MONITORING OF CENTRAL NERVOUS ELECTRICAL ACTIVITY, EXTERNAL APPROACH: ICD-10-PCS | Performed by: PSYCHIATRY & NEUROLOGY

## 2022-01-01 PROCEDURE — 160207 HCHG ENDO MINUTES - EA ADDL 1 MIN LEVEL 3: Performed by: INTERNAL MEDICINE

## 2022-01-01 PROCEDURE — 0BH17EZ INSERTION OF ENDOTRACHEAL AIRWAY INTO TRACHEA, VIA NATURAL OR ARTIFICIAL OPENING: ICD-10-PCS | Performed by: EMERGENCY MEDICINE

## 2022-01-01 PROCEDURE — 82962 GLUCOSE BLOOD TEST: CPT | Mod: 91

## 2022-01-01 PROCEDURE — 99204 OFFICE O/P NEW MOD 45 MIN: CPT | Performed by: INTERNAL MEDICINE

## 2022-01-01 PROCEDURE — C1751 CATH, INF, PER/CENT/MIDLINE: HCPCS

## 2022-01-01 PROCEDURE — 82653 EL-1 FECAL QUANTITATIVE: CPT

## 2022-01-01 PROCEDURE — 700102 HCHG RX REV CODE 250 W/ 637 OVERRIDE(OP): Performed by: RADIOLOGY

## 2022-01-01 PROCEDURE — 88342 IMHCHEM/IMCYTCHM 1ST ANTB: CPT | Mod: XU

## 2022-01-01 PROCEDURE — 95816 EEG AWAKE AND DROWSY: CPT | Performed by: PSYCHIATRY & NEUROLOGY

## 2022-01-01 PROCEDURE — 700117 HCHG RX CONTRAST REV CODE 255: Performed by: EMERGENCY MEDICINE

## 2022-01-01 PROCEDURE — 31624 DX BRONCHOSCOPE/LAVAGE: CPT | Performed by: INTERNAL MEDICINE

## 2022-01-01 PROCEDURE — 160208 HCHG ENDO MINUTES - EA ADDL 1 MIN LEVEL 4: Performed by: INTERNAL MEDICINE

## 2022-01-01 PROCEDURE — 96366 THER/PROPH/DIAG IV INF ADDON: CPT

## 2022-01-01 PROCEDURE — 00813 ANES UPR LWR GI NDSC PX: CPT | Performed by: ANESTHESIOLOGY

## 2022-01-01 PROCEDURE — 700101 HCHG RX REV CODE 250: Performed by: NURSE PRACTITIONER

## 2022-01-01 PROCEDURE — 83540 ASSAY OF IRON: CPT

## 2022-01-01 PROCEDURE — 700102 HCHG RX REV CODE 250 W/ 637 OVERRIDE(OP): Performed by: EMERGENCY MEDICINE

## 2022-01-01 PROCEDURE — 86900 BLOOD TYPING SEROLOGIC ABO: CPT

## 2022-01-01 PROCEDURE — 700101 HCHG RX REV CODE 250: Performed by: ANESTHESIOLOGY

## 2022-01-01 PROCEDURE — 87899 AGENT NOS ASSAY W/OPTIC: CPT | Mod: 91

## 2022-01-01 PROCEDURE — 93306 TTE W/DOPPLER COMPLETE: CPT | Mod: 26 | Performed by: INTERNAL MEDICINE

## 2022-01-01 PROCEDURE — 87426 SARSCOV CORONAVIRUS AG IA: CPT

## 2022-01-01 PROCEDURE — 306581 SET INFUSION,POWERLOC 20G X 1: Performed by: HOSPITALIST

## 2022-01-01 PROCEDURE — 160046 HCHG PACU - 1ST 60 MINS PHASE II

## 2022-01-01 PROCEDURE — 99292 CRITICAL CARE ADDL 30 MIN: CPT | Mod: 25 | Performed by: INTERNAL MEDICINE

## 2022-01-01 PROCEDURE — 84295 ASSAY OF SERUM SODIUM: CPT | Mod: 91

## 2022-01-01 PROCEDURE — 70544 MR ANGIOGRAPHY HEAD W/O DYE: CPT

## 2022-01-01 PROCEDURE — 76775 US EXAM ABDO BACK WALL LIM: CPT

## 2022-01-01 PROCEDURE — 83930 ASSAY OF BLOOD OSMOLALITY: CPT

## 2022-01-01 PROCEDURE — 83605 ASSAY OF LACTIC ACID: CPT

## 2022-01-01 PROCEDURE — 87205 SMEAR GRAM STAIN: CPT | Mod: 91

## 2022-01-01 PROCEDURE — 80143 DRUG ASSAY ACETAMINOPHEN: CPT

## 2022-01-01 PROCEDURE — 160202 HCHG ENDO MINUTES - 1ST 30 MINS LEVEL 3: Performed by: INTERNAL MEDICINE

## 2022-01-01 PROCEDURE — 99284 EMERGENCY DEPT VISIT MOD MDM: CPT

## 2022-01-01 PROCEDURE — 80069 RENAL FUNCTION PANEL: CPT | Mod: 91

## 2022-01-01 PROCEDURE — 80307 DRUG TEST PRSMV CHEM ANLYZR: CPT

## 2022-01-01 PROCEDURE — 99223 1ST HOSP IP/OBS HIGH 75: CPT | Performed by: HOSPITALIST

## 2022-01-01 PROCEDURE — 770000 HCHG ROOM/CARE - INTERMEDIATE ICU *

## 2022-01-01 RX ORDER — ONDANSETRON 2 MG/ML
4 INJECTION INTRAMUSCULAR; INTRAVENOUS EVERY 4 HOURS PRN
Status: DISCONTINUED | OUTPATIENT
Start: 2022-01-01 | End: 2022-01-01

## 2022-01-01 RX ORDER — CARISOPRODOL 350 MG/1
350 TABLET ORAL EVERY 6 HOURS PRN
Qty: 12 TABLET | Refills: 0 | Status: SHIPPED | OUTPATIENT
Start: 2022-01-01 | End: 2022-01-01 | Stop reason: SDUPTHER

## 2022-01-01 RX ORDER — ONDANSETRON 8 MG/1
8 TABLET, ORALLY DISINTEGRATING ORAL EVERY 8 HOURS PRN
Status: DISCONTINUED | OUTPATIENT
Start: 2022-01-01 | End: 2022-01-01 | Stop reason: HOSPADM

## 2022-01-01 RX ORDER — DEXTROSE MONOHYDRATE 50 MG/ML
INJECTION, SOLUTION INTRAVENOUS CONTINUOUS
Status: DISCONTINUED | OUTPATIENT
Start: 2022-01-01 | End: 2022-01-01

## 2022-01-01 RX ORDER — SODIUM CHLORIDE 9 MG/ML
INJECTION, SOLUTION INTRAVENOUS CONTINUOUS
Status: CANCELLED | OUTPATIENT
Start: 2022-01-01

## 2022-01-01 RX ORDER — ACETAMINOPHEN 325 MG/1
325 TABLET ORAL EVERY 4 HOURS PRN
Status: DISCONTINUED | OUTPATIENT
Start: 2022-01-01 | End: 2022-01-01

## 2022-01-01 RX ORDER — POTASSIUM CHLORIDE 20 MEQ/1
40 TABLET, EXTENDED RELEASE ORAL DAILY
Status: DISCONTINUED | OUTPATIENT
Start: 2022-01-01 | End: 2022-01-01

## 2022-01-01 RX ORDER — ONDANSETRON 8 MG/1
8 TABLET, ORALLY DISINTEGRATING ORAL PRN
Status: CANCELLED | OUTPATIENT
Start: 2022-01-01

## 2022-01-01 RX ORDER — MORPHINE SULFATE 4 MG/ML
4 INJECTION INTRAVENOUS ONCE
Status: COMPLETED | OUTPATIENT
Start: 2022-01-01 | End: 2022-01-01

## 2022-01-01 RX ORDER — EPINEPHRINE 1 MG/ML(1)
0.5 AMPUL (ML) INJECTION PRN
Status: CANCELLED | OUTPATIENT
Start: 2022-01-01

## 2022-01-01 RX ORDER — SPIRONOLACTONE 25 MG/1
25 TABLET ORAL 2 TIMES DAILY
Qty: 60 TABLET | Refills: 0 | Status: SHIPPED | OUTPATIENT
Start: 2022-01-01 | End: 2022-01-01

## 2022-01-01 RX ORDER — 0.9 % SODIUM CHLORIDE 0.9 %
3 VIAL (ML) INJECTION PRN
Status: CANCELLED | OUTPATIENT
Start: 2022-01-01

## 2022-01-01 RX ORDER — HEPARIN SODIUM (PORCINE) LOCK FLUSH IV SOLN 100 UNIT/ML 100 UNIT/ML
300-500 SOLUTION INTRAVENOUS PRN
Status: DISCONTINUED | OUTPATIENT
Start: 2022-01-01 | End: 2022-01-01 | Stop reason: HOSPADM

## 2022-01-01 RX ORDER — POTASSIUM CHLORIDE 7.45 MG/ML
10 INJECTION INTRAVENOUS ONCE
Status: COMPLETED | OUTPATIENT
Start: 2022-01-01 | End: 2022-01-01

## 2022-01-01 RX ORDER — FUROSEMIDE 40 MG/1
40 TABLET ORAL DAILY
Qty: 30 TABLET | Refills: 3 | Status: SHIPPED | OUTPATIENT
Start: 2022-01-01 | End: 2022-01-01

## 2022-01-01 RX ORDER — SODIUM CHLORIDE AND POTASSIUM CHLORIDE 150; 900 MG/100ML; MG/100ML
INJECTION, SOLUTION INTRAVENOUS CONTINUOUS
Status: DISCONTINUED | OUTPATIENT
Start: 2022-01-01 | End: 2022-01-01 | Stop reason: HOSPADM

## 2022-01-01 RX ORDER — 0.9 % SODIUM CHLORIDE 0.9 %
VIAL (ML) INJECTION PRN
Status: CANCELLED | OUTPATIENT
Start: 2022-01-01

## 2022-01-01 RX ORDER — HEPARIN SODIUM (PORCINE) LOCK FLUSH IV SOLN 100 UNIT/ML 100 UNIT/ML
SOLUTION INTRAVENOUS
Status: COMPLETED
Start: 2022-01-01 | End: 2022-01-01

## 2022-01-01 RX ORDER — CEFTRIAXONE 1 G/1
1 INJECTION, POWDER, FOR SOLUTION INTRAMUSCULAR; INTRAVENOUS ONCE
Status: COMPLETED | OUTPATIENT
Start: 2022-01-01 | End: 2022-01-01

## 2022-01-01 RX ORDER — SODIUM CHLORIDE, SODIUM LACTATE, POTASSIUM CHLORIDE, AND CALCIUM CHLORIDE .6; .31; .03; .02 G/100ML; G/100ML; G/100ML; G/100ML
1000 INJECTION, SOLUTION INTRAVENOUS ONCE
Status: COMPLETED | OUTPATIENT
Start: 2022-01-01 | End: 2022-01-01

## 2022-01-01 RX ORDER — HEPARIN SODIUM (PORCINE) LOCK FLUSH IV SOLN 100 UNIT/ML 100 UNIT/ML
500 SOLUTION INTRAVENOUS PRN
Status: CANCELLED | OUTPATIENT
Start: 2022-01-01

## 2022-01-01 RX ORDER — DEXTROSE MONOHYDRATE 25 G/50ML
INJECTION, SOLUTION INTRAVENOUS
Status: DISCONTINUED
Start: 2022-01-01 | End: 2022-01-01

## 2022-01-01 RX ORDER — PROCHLORPERAZINE EDISYLATE 5 MG/ML
5-10 INJECTION INTRAMUSCULAR; INTRAVENOUS EVERY 4 HOURS PRN
Status: DISCONTINUED | OUTPATIENT
Start: 2022-01-01 | End: 2022-01-01

## 2022-01-01 RX ORDER — HYDRALAZINE HYDROCHLORIDE 20 MG/ML
20 INJECTION INTRAMUSCULAR; INTRAVENOUS EVERY 6 HOURS PRN
Status: DISCONTINUED | OUTPATIENT
Start: 2022-01-01 | End: 2022-01-01

## 2022-01-01 RX ORDER — 0.9 % SODIUM CHLORIDE 0.9 %
10 VIAL (ML) INJECTION PRN
Status: CANCELLED | OUTPATIENT
Start: 2022-01-01

## 2022-01-01 RX ORDER — POTASSIUM CHLORIDE 20 MEQ/1
40 TABLET, EXTENDED RELEASE ORAL ONCE
Status: COMPLETED | OUTPATIENT
Start: 2022-01-01 | End: 2022-01-01

## 2022-01-01 RX ORDER — SODIUM CHLORIDE, SODIUM LACTATE, POTASSIUM CHLORIDE, CALCIUM CHLORIDE 600; 310; 30; 20 MG/100ML; MG/100ML; MG/100ML; MG/100ML
INJECTION, SOLUTION INTRAVENOUS CONTINUOUS
Status: DISCONTINUED | OUTPATIENT
Start: 2022-01-01 | End: 2022-01-01

## 2022-01-01 RX ORDER — LOPERAMIDE HCL 1 MG/7.5ML
4 SUSPENSION ORAL 4 TIMES DAILY
Status: DISCONTINUED | OUTPATIENT
Start: 2022-01-01 | End: 2022-01-01

## 2022-01-01 RX ORDER — POTASSIUM CHLORIDE 14.9 MG/ML
20 INJECTION INTRAVENOUS ONCE
Status: COMPLETED | OUTPATIENT
Start: 2022-01-01 | End: 2022-01-01

## 2022-01-01 RX ORDER — ONDANSETRON 4 MG/1
4 TABLET, ORALLY DISINTEGRATING ORAL EVERY 4 HOURS PRN
Status: DISCONTINUED | OUTPATIENT
Start: 2022-01-01 | End: 2022-01-01 | Stop reason: HOSPADM

## 2022-01-01 RX ORDER — CARISOPRODOL 350 MG/1
350 TABLET ORAL EVERY 6 HOURS PRN
Status: ON HOLD | COMMUNITY
End: 2022-01-01

## 2022-01-01 RX ORDER — DIPHENOXYLATE HYDROCHLORIDE AND ATROPINE SULFATE 2.5; .025 MG/1; MG/1
1 TABLET ORAL 4 TIMES DAILY PRN
COMMUNITY
End: 2022-01-01

## 2022-01-01 RX ORDER — ACETAMINOPHEN 325 MG/1
650 TABLET ORAL EVERY 4 HOURS PRN
Status: DISCONTINUED | OUTPATIENT
Start: 2022-01-01 | End: 2022-01-01 | Stop reason: HOSPADM

## 2022-01-01 RX ORDER — PROCHLORPERAZINE MALEATE 10 MG
10 TABLET ORAL EVERY 6 HOURS PRN
Status: CANCELLED | OUTPATIENT
Start: 2022-01-01

## 2022-01-01 RX ORDER — LORAZEPAM 2 MG/ML
1 INJECTION INTRAMUSCULAR
Status: COMPLETED | OUTPATIENT
Start: 2022-01-01 | End: 2022-01-01

## 2022-01-01 RX ORDER — POTASSIUM CHLORIDE 7.45 MG/ML
10 INJECTION INTRAVENOUS
Status: DISCONTINUED | OUTPATIENT
Start: 2022-01-01 | End: 2022-01-01

## 2022-01-01 RX ORDER — SODIUM BICARBONATE 650 MG/1
650 TABLET ORAL 2 TIMES DAILY
Status: DISCONTINUED | OUTPATIENT
Start: 2022-01-01 | End: 2022-01-01

## 2022-01-01 RX ORDER — CARISOPRODOL 350 MG/1
350 TABLET ORAL EVERY 6 HOURS PRN
Qty: 78 TABLET | Refills: 0 | Status: SHIPPED | OUTPATIENT
Start: 2022-01-01 | End: 2022-01-01

## 2022-01-01 RX ORDER — SODIUM CHLORIDE, SODIUM LACTATE, POTASSIUM CHLORIDE, CALCIUM CHLORIDE 600; 310; 30; 20 MG/100ML; MG/100ML; MG/100ML; MG/100ML
1000 INJECTION, SOLUTION INTRAVENOUS ONCE
Status: COMPLETED | OUTPATIENT
Start: 2022-01-01 | End: 2022-01-01

## 2022-01-01 RX ORDER — ACETAMINOPHEN 325 MG/1
650 TABLET ORAL ONCE
Status: COMPLETED | OUTPATIENT
Start: 2022-01-01 | End: 2022-01-01

## 2022-01-01 RX ORDER — LOPERAMIDE HYDROCHLORIDE 2 MG/1
4 CAPSULE ORAL 4 TIMES DAILY
Status: DISCONTINUED | OUTPATIENT
Start: 2022-01-01 | End: 2022-01-01

## 2022-01-01 RX ORDER — OMEPRAZOLE 40 MG/1
40 CAPSULE, DELAYED RELEASE ORAL DAILY
Status: ON HOLD | COMMUNITY
End: 2022-01-01 | Stop reason: SDUPTHER

## 2022-01-01 RX ORDER — ONDANSETRON 2 MG/ML
4 INJECTION INTRAMUSCULAR; INTRAVENOUS PRN
Status: CANCELLED | OUTPATIENT
Start: 2022-01-01

## 2022-01-01 RX ORDER — SODIUM CHLORIDE 9 MG/ML
500 INJECTION, SOLUTION INTRAVENOUS
Status: DISCONTINUED | OUTPATIENT
Start: 2022-01-01 | End: 2022-01-01 | Stop reason: HOSPADM

## 2022-01-01 RX ORDER — CARISOPRODOL 350 MG/1
350 TABLET ORAL EVERY 8 HOURS PRN
Qty: 90 TABLET | Refills: 0 | Status: SHIPPED | OUTPATIENT
Start: 2022-01-01 | End: 2022-01-01 | Stop reason: SDUPTHER

## 2022-01-01 RX ORDER — PREDNISONE 50 MG/1
50 TABLET ORAL DAILY
Status: DISCONTINUED | OUTPATIENT
Start: 2022-01-01 | End: 2022-01-01

## 2022-01-01 RX ORDER — METHYLPREDNISOLONE SODIUM SUCCINATE 125 MG/2ML
125 INJECTION, POWDER, LYOPHILIZED, FOR SOLUTION INTRAMUSCULAR; INTRAVENOUS PRN
Status: CANCELLED | OUTPATIENT
Start: 2022-01-01

## 2022-01-01 RX ORDER — ONDANSETRON 2 MG/ML
8 INJECTION INTRAMUSCULAR; INTRAVENOUS ONCE
Status: COMPLETED | OUTPATIENT
Start: 2022-01-01 | End: 2022-01-01

## 2022-01-01 RX ORDER — ONDANSETRON 2 MG/ML
8 INJECTION INTRAMUSCULAR; INTRAVENOUS EVERY 8 HOURS PRN
Status: DISCONTINUED | OUTPATIENT
Start: 2022-01-01 | End: 2022-01-01 | Stop reason: HOSPADM

## 2022-01-01 RX ORDER — PREDNISONE 10 MG/1
10 TABLET ORAL DAILY
Qty: 30 TABLET | Refills: 0 | Status: ON HOLD | OUTPATIENT
Start: 2022-01-01 | End: 2022-01-01 | Stop reason: SDUPTHER

## 2022-01-01 RX ORDER — MORPHINE SULFATE 15 MG/1
15 TABLET, FILM COATED, EXTENDED RELEASE ORAL EVERY 12 HOURS
COMMUNITY
End: 2022-01-01

## 2022-01-01 RX ORDER — OXYCODONE HCL 5 MG/5 ML
10 SOLUTION, ORAL ORAL
Status: COMPLETED | OUTPATIENT
Start: 2022-01-01 | End: 2022-01-01

## 2022-01-01 RX ORDER — DEXTROSE MONOHYDRATE 50 MG/ML
500 INJECTION, SOLUTION INTRAVENOUS CONTINUOUS
Status: ACTIVE | OUTPATIENT
Start: 2022-01-01 | End: 2022-01-01

## 2022-01-01 RX ORDER — SODIUM CHLORIDE, SODIUM LACTATE, POTASSIUM CHLORIDE, CALCIUM CHLORIDE 600; 310; 30; 20 MG/100ML; MG/100ML; MG/100ML; MG/100ML
INJECTION, SOLUTION INTRAVENOUS CONTINUOUS
Status: DISCONTINUED | OUTPATIENT
Start: 2022-01-01 | End: 2022-01-01 | Stop reason: HOSPADM

## 2022-01-01 RX ORDER — OXYCODONE HCL 5 MG/5 ML
5 SOLUTION, ORAL ORAL
Status: COMPLETED | OUTPATIENT
Start: 2022-01-01 | End: 2022-01-01

## 2022-01-01 RX ORDER — POTASSIUM CHLORIDE 20 MEQ/1
40 TABLET, EXTENDED RELEASE ORAL 3 TIMES DAILY
Status: DISCONTINUED | OUTPATIENT
Start: 2022-01-01 | End: 2022-01-01

## 2022-01-01 RX ORDER — CARBOXYMETHYLCELLULOSE SODIUM 5 MG/ML
1 SOLUTION/ DROPS OPHTHALMIC PRN
Status: DISCONTINUED | OUTPATIENT
Start: 2022-01-01 | End: 2022-01-01 | Stop reason: HOSPADM

## 2022-01-01 RX ORDER — DIPHENHYDRAMINE HYDROCHLORIDE 50 MG/ML
12.5 INJECTION INTRAMUSCULAR; INTRAVENOUS
Status: DISCONTINUED | OUTPATIENT
Start: 2022-01-01 | End: 2022-01-01 | Stop reason: HOSPADM

## 2022-01-01 RX ORDER — DEXMEDETOMIDINE HYDROCHLORIDE 4 UG/ML
.1-1.5 INJECTION, SOLUTION INTRAVENOUS CONTINUOUS
Status: DISCONTINUED | OUTPATIENT
Start: 2022-01-01 | End: 2022-01-01

## 2022-01-01 RX ORDER — ONDANSETRON 2 MG/ML
4 INJECTION INTRAMUSCULAR; INTRAVENOUS
Status: DISCONTINUED | OUTPATIENT
Start: 2022-01-01 | End: 2022-01-01 | Stop reason: HOSPADM

## 2022-01-01 RX ORDER — LISINOPRIL 10 MG/1
10 TABLET ORAL DAILY
Status: ON HOLD | COMMUNITY
End: 2022-01-01

## 2022-01-01 RX ORDER — OXYCODONE HYDROCHLORIDE 5 MG/1
5-10 TABLET ORAL EVERY 4 HOURS PRN
Status: DISCONTINUED | OUTPATIENT
Start: 2022-01-01 | End: 2022-01-01

## 2022-01-01 RX ORDER — ACETAMINOPHEN 325 MG/1
650 TABLET ORAL EVERY 6 HOURS PRN
Status: DISCONTINUED | OUTPATIENT
Start: 2022-01-01 | End: 2022-01-01 | Stop reason: HOSPADM

## 2022-01-01 RX ORDER — OXYCODONE HYDROCHLORIDE 10 MG/1
10 TABLET ORAL
Status: ON HOLD | COMMUNITY
End: 2022-01-01

## 2022-01-01 RX ORDER — LOPERAMIDE HCL 1 MG/7.5ML
1 SUSPENSION ORAL 4 TIMES DAILY PRN
Status: DISCONTINUED | OUTPATIENT
Start: 2022-01-01 | End: 2022-01-01

## 2022-01-01 RX ORDER — FAMOTIDINE 20 MG/1
20 TABLET, FILM COATED ORAL DAILY
Status: DISCONTINUED | OUTPATIENT
Start: 2022-01-01 | End: 2022-01-01

## 2022-01-01 RX ORDER — LOPERAMIDE HYDROCHLORIDE 2 MG/1
2 CAPSULE ORAL 4 TIMES DAILY PRN
Status: DISCONTINUED | OUTPATIENT
Start: 2022-01-01 | End: 2022-01-01

## 2022-01-01 RX ORDER — HYDROMORPHONE HYDROCHLORIDE 2 MG/ML
2 INJECTION, SOLUTION INTRAMUSCULAR; INTRAVENOUS; SUBCUTANEOUS EVERY 4 HOURS
Status: DISCONTINUED | OUTPATIENT
Start: 2022-01-01 | End: 2022-01-01

## 2022-01-01 RX ORDER — SPIRONOLACTONE 25 MG/1
25 TABLET ORAL 2 TIMES DAILY
Qty: 60 TABLET | Refills: 0 | Status: SHIPPED
Start: 2022-01-01 | End: 2022-01-01 | Stop reason: SDUPTHER

## 2022-01-01 RX ORDER — AMLODIPINE BESYLATE 5 MG/1
5 TABLET ORAL
Status: DISCONTINUED | OUTPATIENT
Start: 2022-01-01 | End: 2022-01-01

## 2022-01-01 RX ORDER — LIDOCAINE HYDROCHLORIDE 20 MG/ML
INJECTION, SOLUTION EPIDURAL; INFILTRATION; INTRACAUDAL; PERINEURAL PRN
Status: DISCONTINUED | OUTPATIENT
Start: 2022-01-01 | End: 2022-01-01 | Stop reason: SURG

## 2022-01-01 RX ORDER — PEG-3350, SODIUM SULFATE, SODIUM CHLORIDE, POTASSIUM CHLORIDE, SODIUM ASCORBATE AND ASCORBIC ACID 7.5-2.691G
100 KIT ORAL 2 TIMES DAILY
Status: COMPLETED | OUTPATIENT
Start: 2022-01-01 | End: 2022-01-01

## 2022-01-01 RX ORDER — DIPHENOXYLATE HYDROCHLORIDE AND ATROPINE SULFATE 2.5; .025 MG/1; MG/1
1 TABLET ORAL 4 TIMES DAILY PRN
Qty: 30 TABLET | Refills: 0 | Status: SHIPPED | OUTPATIENT
Start: 2022-01-01 | End: 2022-01-01

## 2022-01-01 RX ORDER — HYDROMORPHONE HYDROCHLORIDE 1 MG/ML
0.5 INJECTION, SOLUTION INTRAMUSCULAR; INTRAVENOUS; SUBCUTANEOUS
Status: DISCONTINUED | OUTPATIENT
Start: 2022-01-01 | End: 2022-01-01 | Stop reason: HOSPADM

## 2022-01-01 RX ORDER — POTASSIUM CHLORIDE 20 MEQ/1
40 TABLET, EXTENDED RELEASE ORAL 3 TIMES DAILY
Status: COMPLETED | OUTPATIENT
Start: 2022-01-01 | End: 2022-01-01

## 2022-01-01 RX ORDER — POTASSIUM CHLORIDE 20 MEQ/1
40 TABLET, EXTENDED RELEASE ORAL ONCE
Status: ACTIVE | OUTPATIENT
Start: 2022-01-01 | End: 2022-01-01

## 2022-01-01 RX ORDER — AMLODIPINE BESYLATE 10 MG/1
10 TABLET ORAL DAILY
Qty: 30 TABLET | Status: ON HOLD
Start: 2022-01-01 | End: 2022-01-01

## 2022-01-01 RX ORDER — ONDANSETRON 2 MG/ML
8 INJECTION INTRAMUSCULAR; INTRAVENOUS ONCE
Status: CANCELLED | OUTPATIENT
Start: 2022-01-01 | End: 2022-01-01

## 2022-01-01 RX ORDER — CARISOPRODOL 350 MG/1
350 TABLET ORAL EVERY 6 HOURS PRN
Status: DISCONTINUED | OUTPATIENT
Start: 2022-01-01 | End: 2022-01-01

## 2022-01-01 RX ORDER — NALOXONE HYDROCHLORIDE 1 MG/ML
INJECTION INTRAMUSCULAR; INTRAVENOUS; SUBCUTANEOUS
Status: COMPLETED
Start: 2022-01-01 | End: 2022-01-01

## 2022-01-01 RX ORDER — LEVETIRACETAM 500 MG/1
500 TABLET ORAL 2 TIMES DAILY
Status: DISCONTINUED | OUTPATIENT
Start: 2022-01-01 | End: 2022-01-01

## 2022-01-01 RX ORDER — POTASSIUM CHLORIDE 7.45 MG/ML
10 INJECTION INTRAVENOUS
Status: COMPLETED | OUTPATIENT
Start: 2022-01-01 | End: 2022-01-01

## 2022-01-01 RX ORDER — OXYCODONE HYDROCHLORIDE 5 MG/1
5 TABLET ORAL EVERY 4 HOURS PRN
Qty: 30 TABLET | Refills: 0 | Status: SHIPPED | OUTPATIENT
Start: 2022-01-01 | End: 2022-01-01

## 2022-01-01 RX ORDER — MAGNESIUM SULFATE HEPTAHYDRATE 40 MG/ML
4 INJECTION, SOLUTION INTRAVENOUS ONCE
Status: COMPLETED | OUTPATIENT
Start: 2022-01-01 | End: 2022-01-01

## 2022-01-01 RX ORDER — FUROSEMIDE 20 MG/1
20 TABLET ORAL DAILY
Qty: 5 TABLET | Refills: 0 | Status: SHIPPED | OUTPATIENT
Start: 2022-01-01 | End: 2022-01-01

## 2022-01-01 RX ORDER — PREDNISONE 10 MG/1
10 TABLET ORAL DAILY
Status: ON HOLD | COMMUNITY
End: 2022-01-01

## 2022-01-01 RX ORDER — AMLODIPINE BESYLATE 10 MG/1
10 TABLET ORAL
Status: DISCONTINUED | OUTPATIENT
Start: 2022-01-01 | End: 2022-01-01 | Stop reason: HOSPADM

## 2022-01-01 RX ORDER — MIDAZOLAM HYDROCHLORIDE 1 MG/ML
.5-2 INJECTION INTRAMUSCULAR; INTRAVENOUS PRN
Status: DISCONTINUED | OUTPATIENT
Start: 2022-01-01 | End: 2022-01-01 | Stop reason: HOSPADM

## 2022-01-01 RX ORDER — AMOXICILLIN AND CLAVULANATE POTASSIUM 500; 125 MG/1; MG/1
1 TABLET, FILM COATED ORAL EVERY 12 HOURS
Status: DISPENSED | OUTPATIENT
Start: 2022-01-01 | End: 2022-01-01

## 2022-01-01 RX ORDER — SODIUM CHLORIDE, SODIUM LACTATE, POTASSIUM CHLORIDE, CALCIUM CHLORIDE 600; 310; 30; 20 MG/100ML; MG/100ML; MG/100ML; MG/100ML
2000 INJECTION, SOLUTION INTRAVENOUS ONCE
Status: COMPLETED | OUTPATIENT
Start: 2022-01-01 | End: 2022-01-01

## 2022-01-01 RX ORDER — POTASSIUM CHLORIDE 7.45 MG/ML
INJECTION INTRAVENOUS
Status: COMPLETED
Start: 2022-01-01 | End: 2022-01-01

## 2022-01-01 RX ORDER — LIDOCAINE HYDROCHLORIDE 10 MG/ML
INJECTION, SOLUTION EPIDURAL; INFILTRATION; INTRACAUDAL; PERINEURAL
Status: COMPLETED
Start: 2022-01-01 | End: 2022-01-01

## 2022-01-01 RX ORDER — SODIUM CHLORIDE 9 MG/ML
INJECTION, SOLUTION INTRAVENOUS CONTINUOUS
Status: DISCONTINUED | OUTPATIENT
Start: 2022-01-01 | End: 2022-01-01

## 2022-01-01 RX ORDER — CEFAZOLIN SODIUM 2 G/100ML
2 INJECTION, SOLUTION INTRAVENOUS ONCE
Status: COMPLETED | OUTPATIENT
Start: 2022-01-01 | End: 2022-01-01

## 2022-01-01 RX ORDER — SODIUM CHLORIDE, SODIUM LACTATE, POTASSIUM CHLORIDE, AND CALCIUM CHLORIDE .6; .31; .03; .02 G/100ML; G/100ML; G/100ML; G/100ML
500 INJECTION, SOLUTION INTRAVENOUS ONCE
Status: COMPLETED | OUTPATIENT
Start: 2022-01-01 | End: 2022-01-01

## 2022-01-01 RX ORDER — LABETALOL HYDROCHLORIDE 5 MG/ML
10 INJECTION, SOLUTION INTRAVENOUS EVERY 6 HOURS PRN
Status: DISCONTINUED | OUTPATIENT
Start: 2022-01-01 | End: 2022-01-01

## 2022-01-01 RX ORDER — EPINEPHRINE HCL IN 0.9 % NACL 4MG/250ML
PLASTIC BAG, INJECTION (ML) INTRAVENOUS
Status: DISCONTINUED
Start: 2022-01-01 | End: 2022-01-01

## 2022-01-01 RX ORDER — HEPARIN SODIUM (PORCINE) LOCK FLUSH IV SOLN 100 UNIT/ML 100 UNIT/ML
500 SOLUTION INTRAVENOUS PRN
Status: DISCONTINUED | OUTPATIENT
Start: 2022-01-01 | End: 2022-01-01 | Stop reason: HOSPADM

## 2022-01-01 RX ORDER — OMEPRAZOLE 20 MG/1
20 CAPSULE, DELAYED RELEASE ORAL DAILY
COMMUNITY
End: 2022-01-01

## 2022-01-01 RX ORDER — SODIUM CHLORIDE, SODIUM LACTATE, POTASSIUM CHLORIDE, AND CALCIUM CHLORIDE .6; .31; .03; .02 G/100ML; G/100ML; G/100ML; G/100ML
1000 INJECTION, SOLUTION INTRAVENOUS ONCE
Status: DISCONTINUED | OUTPATIENT
Start: 2022-01-01 | End: 2022-01-01

## 2022-01-01 RX ORDER — NYSTATIN 100000 [USP'U]/G
1 POWDER TOPICAL 2 TIMES DAILY
Qty: 15 G | Refills: 1 | Status: ON HOLD | OUTPATIENT
Start: 2022-01-01 | End: 2022-01-01

## 2022-01-01 RX ORDER — DIPHENHYDRAMINE HYDROCHLORIDE 50 MG/ML
50 INJECTION INTRAMUSCULAR; INTRAVENOUS PRN
Status: CANCELLED | OUTPATIENT
Start: 2022-01-01

## 2022-01-01 RX ORDER — SODIUM CHLORIDE, SODIUM LACTATE, POTASSIUM CHLORIDE, CALCIUM CHLORIDE 600; 310; 30; 20 MG/100ML; MG/100ML; MG/100ML; MG/100ML
INJECTION, SOLUTION INTRAVENOUS CONTINUOUS
Status: ACTIVE | OUTPATIENT
Start: 2022-01-01 | End: 2022-01-01

## 2022-01-01 RX ORDER — POTASSIUM CHLORIDE 20 MEQ/1
40 TABLET, EXTENDED RELEASE ORAL EVERY 4 HOURS
Status: COMPLETED | OUTPATIENT
Start: 2022-01-01 | End: 2022-01-01

## 2022-01-01 RX ORDER — PROCHLORPERAZINE MALEATE 10 MG
10 TABLET ORAL EVERY 6 HOURS PRN
COMMUNITY
End: 2022-01-01

## 2022-01-01 RX ORDER — HYDRALAZINE HYDROCHLORIDE 20 MG/ML
10 INJECTION INTRAMUSCULAR; INTRAVENOUS EVERY 6 HOURS PRN
Status: DISCONTINUED | OUTPATIENT
Start: 2022-01-01 | End: 2022-01-01

## 2022-01-01 RX ORDER — FLUOXETINE HYDROCHLORIDE 20 MG/1
20 CAPSULE ORAL DAILY
Status: DISCONTINUED | OUTPATIENT
Start: 2022-01-01 | End: 2022-01-01

## 2022-01-01 RX ORDER — DRONABINOL 2.5 MG/1
2.5 CAPSULE ORAL 2 TIMES DAILY
Qty: 60 CAPSULE | Refills: 0 | Status: SHIPPED | OUTPATIENT
Start: 2022-01-01 | End: 2022-01-01

## 2022-01-01 RX ORDER — DIPHENOXYLATE HCL/ATROPINE 2.5-.025/5
5 LIQUID (ML) ORAL 4 TIMES DAILY PRN
Status: DISCONTINUED | OUTPATIENT
Start: 2022-01-01 | End: 2022-01-01 | Stop reason: HOSPADM

## 2022-01-01 RX ORDER — ONDANSETRON 4 MG/1
4 TABLET, ORALLY DISINTEGRATING ORAL EVERY 4 HOURS PRN
Status: DISCONTINUED | OUTPATIENT
Start: 2022-01-01 | End: 2022-01-01

## 2022-01-01 RX ORDER — PREDNISONE 10 MG/1
10 TABLET ORAL DAILY
Status: DISCONTINUED | OUTPATIENT
Start: 2022-01-01 | End: 2022-01-01

## 2022-01-01 RX ORDER — ENOXAPARIN SODIUM 100 MG/ML
40 INJECTION SUBCUTANEOUS DAILY
Status: DISCONTINUED | OUTPATIENT
Start: 2022-01-01 | End: 2022-01-01

## 2022-01-01 RX ORDER — MAGNESIUM SULFATE HEPTAHYDRATE 40 MG/ML
2 INJECTION, SOLUTION INTRAVENOUS ONCE
Status: COMPLETED | OUTPATIENT
Start: 2022-01-01 | End: 2022-01-01

## 2022-01-01 RX ORDER — AMOXICILLIN 250 MG
2 CAPSULE ORAL 2 TIMES DAILY
Status: DISCONTINUED | OUTPATIENT
Start: 2022-01-01 | End: 2022-01-01

## 2022-01-01 RX ORDER — LEVETIRACETAM 500 MG/5ML
1000 INJECTION, SOLUTION, CONCENTRATE INTRAVENOUS ONCE
Status: COMPLETED | OUTPATIENT
Start: 2022-01-01 | End: 2022-01-01

## 2022-01-01 RX ORDER — LEVETIRACETAM 500 MG/1
500 TABLET ORAL 2 TIMES DAILY
Qty: 60 TABLET | Status: ON HOLD
Start: 2022-01-01 | End: 2022-01-01

## 2022-01-01 RX ORDER — METOCLOPRAMIDE HYDROCHLORIDE 5 MG/ML
5 INJECTION INTRAMUSCULAR; INTRAVENOUS EVERY 6 HOURS PRN
Status: DISCONTINUED | OUTPATIENT
Start: 2022-01-01 | End: 2022-01-01 | Stop reason: HOSPADM

## 2022-01-01 RX ORDER — POLYETHYLENE GLYCOL 3350 17 G/17G
1 POWDER, FOR SOLUTION ORAL
Status: DISCONTINUED | OUTPATIENT
Start: 2022-01-01 | End: 2022-01-01

## 2022-01-01 RX ORDER — ONDANSETRON 4 MG/1
4 TABLET, ORALLY DISINTEGRATING ORAL EVERY 4 HOURS PRN
COMMUNITY
End: 2022-01-01

## 2022-01-01 RX ORDER — SODIUM CHLORIDE, SODIUM LACTATE, POTASSIUM CHLORIDE, AND CALCIUM CHLORIDE .6; .31; .03; .02 G/100ML; G/100ML; G/100ML; G/100ML
30 INJECTION, SOLUTION INTRAVENOUS ONCE
Status: COMPLETED | OUTPATIENT
Start: 2022-01-01 | End: 2022-01-01

## 2022-01-01 RX ORDER — SODIUM CHLORIDE 9 MG/ML
INJECTION, SOLUTION INTRAVENOUS CONTINUOUS
Status: DISCONTINUED | OUTPATIENT
Start: 2022-01-01 | End: 2022-01-01 | Stop reason: HOSPADM

## 2022-01-01 RX ORDER — LABETALOL HYDROCHLORIDE 5 MG/ML
10 INJECTION, SOLUTION INTRAVENOUS EVERY 4 HOURS PRN
Status: DISCONTINUED | OUTPATIENT
Start: 2022-01-01 | End: 2022-01-01 | Stop reason: HOSPADM

## 2022-01-01 RX ORDER — PROCHLORPERAZINE EDISYLATE 5 MG/ML
5-10 INJECTION INTRAMUSCULAR; INTRAVENOUS EVERY 4 HOURS PRN
Status: DISCONTINUED | OUTPATIENT
Start: 2022-01-01 | End: 2022-01-01 | Stop reason: HOSPADM

## 2022-01-01 RX ORDER — LORAZEPAM 2 MG/ML
2 INJECTION INTRAMUSCULAR
Status: DISCONTINUED | OUTPATIENT
Start: 2022-01-01 | End: 2022-01-01 | Stop reason: HOSPADM

## 2022-01-01 RX ORDER — LIDOCAINE AND PRILOCAINE 25; 25 MG/G; MG/G
CREAM TOPICAL
Qty: 1 EACH | Refills: 3 | Status: SHIPPED | OUTPATIENT
Start: 2022-01-01 | End: 2022-01-01

## 2022-01-01 RX ORDER — GLYCOPYRROLATE 0.2 MG/ML
0.2 INJECTION INTRAMUSCULAR; INTRAVENOUS 3 TIMES DAILY PRN
Status: DISCONTINUED | OUTPATIENT
Start: 2022-01-01 | End: 2022-01-01 | Stop reason: HOSPADM

## 2022-01-01 RX ORDER — CHOLESTYRAMINE LIGHT 4 G/5.7G
1 POWDER, FOR SUSPENSION ORAL 2 TIMES DAILY
Status: DISCONTINUED | OUTPATIENT
Start: 2022-01-01 | End: 2022-01-01 | Stop reason: HOSPADM

## 2022-01-01 RX ORDER — ONDANSETRON 4 MG/1
4 TABLET, FILM COATED ORAL EVERY 4 HOURS PRN
Qty: 30 TABLET | Refills: 6 | Status: SHIPPED | OUTPATIENT
Start: 2022-01-01 | End: 2022-01-01 | Stop reason: SDUPTHER

## 2022-01-01 RX ORDER — METOPROLOL TARTRATE 1 MG/ML
5 INJECTION, SOLUTION INTRAVENOUS EVERY 6 HOURS
Status: DISCONTINUED | OUTPATIENT
Start: 2022-01-01 | End: 2022-01-01

## 2022-01-01 RX ORDER — METHYLPREDNISOLONE SODIUM SUCCINATE 40 MG/ML
20 INJECTION, POWDER, LYOPHILIZED, FOR SOLUTION INTRAMUSCULAR; INTRAVENOUS EVERY 8 HOURS
Status: DISCONTINUED | OUTPATIENT
Start: 2022-01-01 | End: 2022-01-01 | Stop reason: HOSPADM

## 2022-01-01 RX ORDER — HEPARIN SODIUM (PORCINE) LOCK FLUSH IV SOLN 100 UNIT/ML 100 UNIT/ML
500 SOLUTION INTRAVENOUS ONCE
Status: DISCONTINUED | OUTPATIENT
Start: 2022-01-01 | End: 2022-01-01 | Stop reason: HOSPADM

## 2022-01-01 RX ORDER — OXYCODONE HYDROCHLORIDE 10 MG/1
10 TABLET ORAL
Status: DISCONTINUED | OUTPATIENT
Start: 2022-01-01 | End: 2022-01-01 | Stop reason: HOSPADM

## 2022-01-01 RX ORDER — SPIRONOLACTONE 25 MG/1
25 TABLET ORAL 2 TIMES DAILY
Qty: 60 TABLET | Refills: 0 | Status: SHIPPED | OUTPATIENT
Start: 2022-01-01 | End: 2022-01-01 | Stop reason: SDUPTHER

## 2022-01-01 RX ORDER — NALOXONE HYDROCHLORIDE 0.4 MG/ML
0.4 INJECTION, SOLUTION INTRAMUSCULAR; INTRAVENOUS; SUBCUTANEOUS ONCE
Status: COMPLETED | OUTPATIENT
Start: 2022-01-01 | End: 2022-01-01

## 2022-01-01 RX ORDER — METOPROLOL TARTRATE 1 MG/ML
1 INJECTION, SOLUTION INTRAVENOUS
Status: DISCONTINUED | OUTPATIENT
Start: 2022-01-01 | End: 2022-01-01 | Stop reason: HOSPADM

## 2022-01-01 RX ORDER — SODIUM BICARBONATE IN D5W 150/1000ML
PLASTIC BAG, INJECTION (ML) INTRAVENOUS CONTINUOUS
Status: DISPENSED | OUTPATIENT
Start: 2022-01-01 | End: 2022-01-01

## 2022-01-01 RX ORDER — PROMETHAZINE HYDROCHLORIDE 25 MG/1
12.5-25 SUPPOSITORY RECTAL EVERY 4 HOURS PRN
Status: DISCONTINUED | OUTPATIENT
Start: 2022-01-01 | End: 2022-01-01

## 2022-01-01 RX ORDER — ONDANSETRON 2 MG/ML
4 INJECTION INTRAMUSCULAR; INTRAVENOUS EVERY 8 HOURS PRN
Status: DISCONTINUED | OUTPATIENT
Start: 2022-01-01 | End: 2022-01-01 | Stop reason: HOSPADM

## 2022-01-01 RX ORDER — CLONAZEPAM 1 MG/1
2 TABLET ORAL 3 TIMES DAILY PRN
Status: DISCONTINUED | OUTPATIENT
Start: 2022-01-01 | End: 2022-01-01

## 2022-01-01 RX ORDER — ONDANSETRON 2 MG/ML
4 INJECTION INTRAMUSCULAR; INTRAVENOUS EVERY 4 HOURS PRN
Status: DISCONTINUED | OUTPATIENT
Start: 2022-01-01 | End: 2022-01-01 | Stop reason: HOSPADM

## 2022-01-01 RX ORDER — OMEPRAZOLE 20 MG/1
20 CAPSULE, DELAYED RELEASE ORAL 2 TIMES DAILY
Status: DISCONTINUED | OUTPATIENT
Start: 2022-01-01 | End: 2022-01-01

## 2022-01-01 RX ORDER — PROMETHAZINE HYDROCHLORIDE 25 MG/1
12.5-25 SUPPOSITORY RECTAL EVERY 4 HOURS PRN
Status: DISCONTINUED | OUTPATIENT
Start: 2022-01-01 | End: 2022-01-01 | Stop reason: HOSPADM

## 2022-01-01 RX ORDER — OMEPRAZOLE 20 MG/1
40 CAPSULE, DELAYED RELEASE ORAL DAILY
Status: DISCONTINUED | OUTPATIENT
Start: 2022-01-01 | End: 2022-01-01 | Stop reason: HOSPADM

## 2022-01-01 RX ORDER — HYDROMORPHONE HYDROCHLORIDE 1 MG/ML
1 INJECTION, SOLUTION INTRAMUSCULAR; INTRAVENOUS; SUBCUTANEOUS
Status: DISCONTINUED | OUTPATIENT
Start: 2022-01-01 | End: 2022-01-01 | Stop reason: HOSPADM

## 2022-01-01 RX ORDER — OCTREOTIDE ACETATE 100 UG/ML
100 INJECTION, SOLUTION INTRAVENOUS; SUBCUTANEOUS 3 TIMES DAILY
Status: DISCONTINUED | OUTPATIENT
Start: 2022-01-01 | End: 2022-01-01

## 2022-01-01 RX ORDER — PREDNISONE 20 MG/1
40 TABLET ORAL 2 TIMES DAILY WITH MEALS
Status: DISCONTINUED | OUTPATIENT
Start: 2022-01-01 | End: 2022-01-01

## 2022-01-01 RX ORDER — PREDNISONE 20 MG/1
20 TABLET ORAL 2 TIMES DAILY WITH MEALS
Status: DISCONTINUED | OUTPATIENT
Start: 2022-01-01 | End: 2022-01-01 | Stop reason: HOSPADM

## 2022-01-01 RX ORDER — ACETAMINOPHEN 500 MG
1000 TABLET ORAL 2 TIMES DAILY PRN
Status: ON HOLD | COMMUNITY
End: 2022-01-01 | Stop reason: SDUPTHER

## 2022-01-01 RX ORDER — OMEPRAZOLE 20 MG/1
20 CAPSULE, DELAYED RELEASE ORAL ONCE
Status: COMPLETED | OUTPATIENT
Start: 2022-01-01 | End: 2022-01-01

## 2022-01-01 RX ORDER — PROMETHAZINE HYDROCHLORIDE 25 MG/1
12.5-25 TABLET ORAL EVERY 4 HOURS PRN
Status: DISCONTINUED | OUTPATIENT
Start: 2022-01-01 | End: 2022-01-01

## 2022-01-01 RX ORDER — LORAZEPAM 2 MG/ML
1 INJECTION INTRAMUSCULAR
Status: DISCONTINUED | OUTPATIENT
Start: 2022-01-01 | End: 2022-01-01 | Stop reason: HOSPADM

## 2022-01-01 RX ORDER — AMLODIPINE BESYLATE 5 MG/1
10 TABLET ORAL
Status: DISCONTINUED | OUTPATIENT
Start: 2022-01-01 | End: 2022-01-01

## 2022-01-01 RX ORDER — LORAZEPAM 2 MG/ML
0.5 INJECTION INTRAMUSCULAR
Status: DISCONTINUED | OUTPATIENT
Start: 2022-01-01 | End: 2022-01-01

## 2022-01-01 RX ORDER — HYDROCODONE BITARTRATE AND ACETAMINOPHEN 10; 325 MG/1; MG/1
1-2 TABLET ORAL 4 TIMES DAILY PRN
Status: ON HOLD | COMMUNITY
End: 2022-01-01

## 2022-01-01 RX ORDER — PANTOPRAZOLE SODIUM 40 MG/10ML
40 INJECTION, POWDER, LYOPHILIZED, FOR SOLUTION INTRAVENOUS 2 TIMES DAILY
Status: DISCONTINUED | OUTPATIENT
Start: 2022-01-01 | End: 2022-01-01

## 2022-01-01 RX ORDER — HEPARIN SODIUM 5000 [USP'U]/ML
5000 INJECTION, SOLUTION INTRAVENOUS; SUBCUTANEOUS EVERY 8 HOURS
Status: DISCONTINUED | OUTPATIENT
Start: 2022-01-01 | End: 2022-01-01

## 2022-01-01 RX ORDER — MIDAZOLAM HYDROCHLORIDE 1 MG/ML
INJECTION INTRAMUSCULAR; INTRAVENOUS
Status: COMPLETED
Start: 2022-01-01 | End: 2022-01-01

## 2022-01-01 RX ORDER — FUROSEMIDE 10 MG/ML
20 INJECTION INTRAMUSCULAR; INTRAVENOUS ONCE
Status: COMPLETED | OUTPATIENT
Start: 2022-01-01 | End: 2022-01-01

## 2022-01-01 RX ORDER — HYDROCODONE BITARTRATE AND ACETAMINOPHEN 5; 325 MG/1; MG/1
2 TABLET ORAL ONCE
Status: COMPLETED | OUTPATIENT
Start: 2022-01-01 | End: 2022-01-01

## 2022-01-01 RX ORDER — OXYCODONE HYDROCHLORIDE 5 MG/1
5 TABLET ORAL ONCE
Status: COMPLETED | OUTPATIENT
Start: 2022-01-01 | End: 2022-01-01

## 2022-01-01 RX ORDER — KETAMINE HYDROCHLORIDE 50 MG/ML
INJECTION, SOLUTION INTRAMUSCULAR; INTRAVENOUS
Status: COMPLETED
Start: 2022-01-01 | End: 2022-01-01

## 2022-01-01 RX ORDER — BISACODYL 10 MG
10 SUPPOSITORY, RECTAL RECTAL
Status: DISCONTINUED | OUTPATIENT
Start: 2022-01-01 | End: 2022-01-01

## 2022-01-01 RX ORDER — PHENYLEPHRINE HCL IN 0.9% NACL 0.5 MG/5ML
SYRINGE (ML) INTRAVENOUS
Status: ACTIVE
Start: 2022-01-01 | End: 2022-01-01

## 2022-01-01 RX ORDER — HYDROMORPHONE HYDROCHLORIDE 1 MG/ML
0.2 INJECTION, SOLUTION INTRAMUSCULAR; INTRAVENOUS; SUBCUTANEOUS
Status: DISCONTINUED | OUTPATIENT
Start: 2022-01-01 | End: 2022-01-01 | Stop reason: HOSPADM

## 2022-01-01 RX ORDER — POTASSIUM CHLORIDE 29.8 MG/ML
40 INJECTION INTRAVENOUS ONCE
Status: COMPLETED | OUTPATIENT
Start: 2022-01-01 | End: 2022-01-01

## 2022-01-01 RX ORDER — ACETAMINOPHEN 500 MG
500 TABLET ORAL EVERY 6 HOURS PRN
Status: ON HOLD
Start: 2022-01-01 | End: 2022-01-01

## 2022-01-01 RX ORDER — CHOLESTYRAMINE LIGHT 4 G/5.7G
1 POWDER, FOR SUSPENSION ORAL 2 TIMES DAILY
Status: DISCONTINUED | OUTPATIENT
Start: 2022-01-01 | End: 2022-01-01

## 2022-01-01 RX ORDER — LISINOPRIL 10 MG/1
10 TABLET ORAL DAILY
Status: DISCONTINUED | OUTPATIENT
Start: 2022-01-01 | End: 2022-01-01

## 2022-01-01 RX ORDER — OMEPRAZOLE 40 MG/1
40 CAPSULE, DELAYED RELEASE ORAL 2 TIMES DAILY
Status: ON HOLD
Start: 2022-01-01 | End: 2022-01-01

## 2022-01-01 RX ORDER — POTASSIUM CHLORIDE 7.45 MG/ML
10 INJECTION INTRAVENOUS
Status: DISPENSED | OUTPATIENT
Start: 2022-01-01 | End: 2022-01-01

## 2022-01-01 RX ORDER — SPIRONOLACTONE 25 MG/1
25 TABLET ORAL 2 TIMES DAILY
Qty: 180 TABLET | Refills: 0 | OUTPATIENT
Start: 2022-01-01

## 2022-01-01 RX ORDER — NYSTATIN 100000 [USP'U]/G
POWDER TOPICAL 2 TIMES DAILY
Status: DISCONTINUED | OUTPATIENT
Start: 2022-01-01 | End: 2022-01-01 | Stop reason: HOSPADM

## 2022-01-01 RX ORDER — PREDNISONE 50 MG/1
50 TABLET ORAL 2 TIMES DAILY WITH MEALS
Status: DISCONTINUED | OUTPATIENT
Start: 2022-01-01 | End: 2022-01-01

## 2022-01-01 RX ORDER — LACTULOSE 20 G/30ML
10 SOLUTION ORAL
Status: DISCONTINUED | OUTPATIENT
Start: 2022-01-01 | End: 2022-01-01 | Stop reason: HOSPADM

## 2022-01-01 RX ORDER — LANOLIN ALCOHOL/MO/W.PET/CERES
400 CREAM (GRAM) TOPICAL 2 TIMES DAILY
Status: COMPLETED | OUTPATIENT
Start: 2022-01-01 | End: 2022-01-01

## 2022-01-01 RX ORDER — PROCHLORPERAZINE MALEATE 10 MG
10 TABLET ORAL EVERY 6 HOURS PRN
Qty: 30 TABLET | Refills: 6 | Status: SHIPPED | OUTPATIENT
Start: 2022-01-01 | End: 2022-01-01

## 2022-01-01 RX ORDER — ONDANSETRON 2 MG/ML
4 INJECTION INTRAMUSCULAR; INTRAVENOUS PRN
Status: DISCONTINUED | OUTPATIENT
Start: 2022-01-01 | End: 2022-01-01 | Stop reason: HOSPADM

## 2022-01-01 RX ORDER — ATROPINE SULFATE 10 MG/ML
2 SOLUTION/ DROPS OPHTHALMIC EVERY 4 HOURS PRN
Status: DISCONTINUED | OUTPATIENT
Start: 2022-01-01 | End: 2022-01-01 | Stop reason: HOSPADM

## 2022-01-01 RX ORDER — PREDNISONE 20 MG/1
40 TABLET ORAL DAILY
Qty: 21 TABLET | Refills: 0 | Status: SHIPPED | OUTPATIENT
Start: 2022-01-01 | End: 2022-01-01 | Stop reason: SDUPTHER

## 2022-01-01 RX ORDER — LORAZEPAM 2 MG/ML
1 CONCENTRATE ORAL
Status: DISCONTINUED | OUTPATIENT
Start: 2022-01-01 | End: 2022-01-01 | Stop reason: HOSPADM

## 2022-01-01 RX ORDER — CHOLESTYRAMINE LIGHT 4 G/5.7G
4 POWDER, FOR SUSPENSION ORAL 2 TIMES DAILY
Status: ON HOLD
Start: 2022-01-01 | End: 2022-01-01

## 2022-01-01 RX ORDER — OXYCODONE HYDROCHLORIDE 5 MG/1
10 TABLET ORAL
Status: DISCONTINUED | OUTPATIENT
Start: 2022-01-01 | End: 2022-01-01 | Stop reason: HOSPADM

## 2022-01-01 RX ORDER — HEPARIN SODIUM 5000 [USP'U]/ML
5000 INJECTION, SOLUTION INTRAVENOUS; SUBCUTANEOUS EVERY 8 HOURS
Status: DISCONTINUED | OUTPATIENT
Start: 2022-01-01 | End: 2022-01-01 | Stop reason: HOSPADM

## 2022-01-01 RX ORDER — OXYCODONE HYDROCHLORIDE 5 MG/1
5 TABLET ORAL
Status: DISCONTINUED | OUTPATIENT
Start: 2022-01-01 | End: 2022-01-01 | Stop reason: HOSPADM

## 2022-01-01 RX ORDER — POTASSIUM CHLORIDE 20 MEQ/1
40 TABLET, EXTENDED RELEASE ORAL 2 TIMES DAILY
Status: DISCONTINUED | OUTPATIENT
Start: 2022-01-01 | End: 2022-01-01 | Stop reason: HOSPADM

## 2022-01-01 RX ORDER — BISACODYL 10 MG
10 SUPPOSITORY, RECTAL RECTAL
Status: DISCONTINUED | OUTPATIENT
Start: 2022-01-01 | End: 2022-01-01 | Stop reason: HOSPADM

## 2022-01-01 RX ORDER — ONDANSETRON 2 MG/ML
4 INJECTION INTRAMUSCULAR; INTRAVENOUS ONCE
Status: COMPLETED | OUTPATIENT
Start: 2022-01-01 | End: 2022-01-01

## 2022-01-01 RX ORDER — ALBUTEROL SULFATE 90 UG/1
2 AEROSOL, METERED RESPIRATORY (INHALATION) EVERY 4 HOURS PRN
COMMUNITY
End: 2022-01-01

## 2022-01-01 RX ORDER — MIDAZOLAM HYDROCHLORIDE 1 MG/ML
1-5 INJECTION INTRAMUSCULAR; INTRAVENOUS ONCE
Status: COMPLETED | OUTPATIENT
Start: 2022-01-01 | End: 2022-01-01

## 2022-01-01 RX ORDER — SODIUM CHLORIDE 9 MG/ML
1000 INJECTION, SOLUTION INTRAVENOUS ONCE
Status: COMPLETED | OUTPATIENT
Start: 2022-01-01 | End: 2022-01-01

## 2022-01-01 RX ORDER — HYDRALAZINE HYDROCHLORIDE 20 MG/ML
INJECTION INTRAMUSCULAR; INTRAVENOUS
Status: COMPLETED
Start: 2022-01-01 | End: 2022-01-01

## 2022-01-01 RX ORDER — ACETAMINOPHEN 650 MG/1
650 SUPPOSITORY RECTAL EVERY 4 HOURS PRN
Status: DISCONTINUED | OUTPATIENT
Start: 2022-01-01 | End: 2022-01-01 | Stop reason: HOSPADM

## 2022-01-01 RX ORDER — HYDROMORPHONE HYDROCHLORIDE 1 MG/ML
0.4 INJECTION, SOLUTION INTRAMUSCULAR; INTRAVENOUS; SUBCUTANEOUS
Status: DISCONTINUED | OUTPATIENT
Start: 2022-01-01 | End: 2022-01-01 | Stop reason: HOSPADM

## 2022-01-01 RX ORDER — POTASSIUM CHLORIDE 20 MEQ/1
40 TABLET, EXTENDED RELEASE ORAL ONCE
Status: DISCONTINUED | OUTPATIENT
Start: 2022-01-01 | End: 2022-01-01

## 2022-01-01 RX ORDER — ACETAMINOPHEN 500 MG
1000 TABLET ORAL 2 TIMES DAILY PRN
COMMUNITY
End: 2022-01-01

## 2022-01-01 RX ORDER — SPIRONOLACTONE 25 MG/1
25 TABLET ORAL 2 TIMES DAILY
Qty: 180 TABLET | Refills: 0 | Status: SHIPPED | OUTPATIENT
Start: 2022-01-01 | End: 2022-01-01

## 2022-01-01 RX ORDER — LOPERAMIDE HYDROCHLORIDE 2 MG/1
2 CAPSULE ORAL 4 TIMES DAILY PRN
Status: DISCONTINUED | OUTPATIENT
Start: 2022-01-01 | End: 2022-01-01 | Stop reason: HOSPADM

## 2022-01-01 RX ORDER — HYDRALAZINE HYDROCHLORIDE 20 MG/ML
20 INJECTION INTRAMUSCULAR; INTRAVENOUS EVERY 4 HOURS PRN
Status: DISCONTINUED | OUTPATIENT
Start: 2022-01-01 | End: 2022-01-01 | Stop reason: HOSPADM

## 2022-01-01 RX ORDER — SODIUM BICARBONATE 650 MG/1
650 TABLET ORAL 3 TIMES DAILY
Status: DISCONTINUED | OUTPATIENT
Start: 2022-01-01 | End: 2022-01-01 | Stop reason: HOSPADM

## 2022-01-01 RX ORDER — SODIUM BICARBONATE IN D5W 150/1000ML
PLASTIC BAG, INJECTION (ML) INTRAVENOUS CONTINUOUS
Status: DISCONTINUED | OUTPATIENT
Start: 2022-01-01 | End: 2022-01-01

## 2022-01-01 RX ORDER — NOREPINEPHRINE BITARTRATE 0.03 MG/ML
INJECTION, SOLUTION INTRAVENOUS
Status: DISCONTINUED
Start: 2022-01-01 | End: 2022-01-01

## 2022-01-01 RX ORDER — GAUZE BANDAGE 2" X 2"
100 BANDAGE TOPICAL DAILY
Status: DISCONTINUED | OUTPATIENT
Start: 2022-01-01 | End: 2022-01-01 | Stop reason: HOSPADM

## 2022-01-01 RX ORDER — ONDANSETRON 2 MG/ML
4 INJECTION INTRAMUSCULAR; INTRAVENOUS EVERY 6 HOURS PRN
Status: DISCONTINUED | OUTPATIENT
Start: 2022-01-01 | End: 2022-01-01 | Stop reason: HOSPADM

## 2022-01-01 RX ORDER — HALOPERIDOL 5 MG/ML
1 INJECTION INTRAMUSCULAR
Status: DISCONTINUED | OUTPATIENT
Start: 2022-01-01 | End: 2022-01-01 | Stop reason: HOSPADM

## 2022-01-01 RX ORDER — HYDROMORPHONE HYDROCHLORIDE 2 MG/ML
2 INJECTION, SOLUTION INTRAMUSCULAR; INTRAVENOUS; SUBCUTANEOUS
Status: DISCONTINUED | OUTPATIENT
Start: 2022-01-01 | End: 2022-01-01 | Stop reason: HOSPADM

## 2022-01-01 RX ORDER — ONDANSETRON 4 MG/1
4 TABLET, FILM COATED ORAL EVERY 4 HOURS PRN
Qty: 60 TABLET | Refills: 2 | Status: SHIPPED | OUTPATIENT
Start: 2022-01-01 | End: 2022-01-01

## 2022-01-01 RX ORDER — CLONAZEPAM 2 MG/1
2 TABLET ORAL 3 TIMES DAILY PRN
Status: ON HOLD | COMMUNITY
End: 2022-01-01

## 2022-01-01 RX ORDER — POTASSIUM CHLORIDE 20 MEQ/1
40 TABLET, EXTENDED RELEASE ORAL 2 TIMES DAILY
Qty: 40 TABLET | Refills: 0 | Status: SHIPPED | OUTPATIENT
Start: 2022-01-01 | End: 2022-01-01

## 2022-01-01 RX ORDER — GLYCOPYRROLATE 1 MG/1
1 TABLET ORAL 3 TIMES DAILY PRN
Status: DISCONTINUED | OUTPATIENT
Start: 2022-01-01 | End: 2022-01-01 | Stop reason: HOSPADM

## 2022-01-01 RX ORDER — FAMOTIDINE 20 MG/1
20 TABLET, FILM COATED ORAL EVERY 12 HOURS
Status: DISCONTINUED | OUTPATIENT
Start: 2022-01-01 | End: 2022-01-01

## 2022-01-01 RX ORDER — CEFAZOLIN SODIUM 1 G/3ML
INJECTION, POWDER, FOR SOLUTION INTRAMUSCULAR; INTRAVENOUS
Status: DISCONTINUED
Start: 2022-01-01 | End: 2022-01-01 | Stop reason: HOSPADM

## 2022-01-01 RX ORDER — PREDNISONE 20 MG/1
40 TABLET ORAL DAILY
Qty: 21 TABLET | Refills: 0 | Status: SHIPPED | OUTPATIENT
Start: 2022-01-01 | End: 2022-01-01

## 2022-01-01 RX ORDER — GAUZE BANDAGE 2" X 2"
100 BANDAGE TOPICAL DAILY
Status: DISCONTINUED | OUTPATIENT
Start: 2022-01-01 | End: 2022-01-01

## 2022-01-01 RX ORDER — MORPHINE SULFATE 100 MG/5ML
40 SOLUTION ORAL
Status: DISCONTINUED | OUTPATIENT
Start: 2022-01-01 | End: 2022-01-01 | Stop reason: HOSPADM

## 2022-01-01 RX ORDER — OXYCODONE HYDROCHLORIDE 10 MG/1
10 TABLET ORAL EVERY 4 HOURS PRN
Status: ON HOLD | COMMUNITY
End: 2022-01-01

## 2022-01-01 RX ORDER — PROCHLORPERAZINE MALEATE 10 MG
10 TABLET ORAL EVERY 6 HOURS PRN
Qty: 30 TABLET | Refills: 6 | Status: ON HOLD | OUTPATIENT
Start: 2022-01-01 | End: 2022-01-01

## 2022-01-01 RX ORDER — DEXTROSE MONOHYDRATE 50 MG/ML
500 INJECTION, SOLUTION INTRAVENOUS CONTINUOUS
Status: DISCONTINUED | OUTPATIENT
Start: 2022-01-01 | End: 2022-01-01

## 2022-01-01 RX ORDER — ALBUTEROL SULFATE 90 UG/1
2 AEROSOL, METERED RESPIRATORY (INHALATION)
Qty: 8.5 G | Refills: 0 | Status: ON HOLD | OUTPATIENT
Start: 2022-01-01 | End: 2022-01-01

## 2022-01-01 RX ORDER — LINEZOLID 2 MG/ML
600 INJECTION, SOLUTION INTRAVENOUS EVERY 12 HOURS
Status: DISCONTINUED | OUTPATIENT
Start: 2022-01-01 | End: 2022-01-01

## 2022-01-01 RX ORDER — HYDRALAZINE HYDROCHLORIDE 20 MG/ML
5 INJECTION INTRAMUSCULAR; INTRAVENOUS
Status: DISCONTINUED | OUTPATIENT
Start: 2022-01-01 | End: 2022-01-01 | Stop reason: HOSPADM

## 2022-01-01 RX ORDER — DIPHENOXYLATE HYDROCHLORIDE AND ATROPINE SULFATE 2.5; .025 MG/1; MG/1
1 TABLET ORAL 4 TIMES DAILY PRN
Qty: 60 TABLET | Refills: 0 | Status: SHIPPED | OUTPATIENT
Start: 2022-01-01 | End: 2022-01-01

## 2022-01-01 RX ORDER — HYDROMORPHONE HYDROCHLORIDE 1 MG/ML
1 INJECTION, SOLUTION INTRAMUSCULAR; INTRAVENOUS; SUBCUTANEOUS EVERY 4 HOURS
Status: DISCONTINUED | OUTPATIENT
Start: 2022-01-01 | End: 2022-01-01

## 2022-01-01 RX ORDER — LORAZEPAM 2 MG/ML
2 INJECTION INTRAMUSCULAR EVERY 4 HOURS
Status: DISCONTINUED | OUTPATIENT
Start: 2022-01-01 | End: 2022-01-01

## 2022-01-01 RX ORDER — MORPHINE SULFATE 100 MG/5ML
20 SOLUTION ORAL
Status: DISCONTINUED | OUTPATIENT
Start: 2022-01-01 | End: 2022-01-01 | Stop reason: HOSPADM

## 2022-01-01 RX ORDER — CARISOPRODOL 350 MG/1
350 TABLET ORAL EVERY 6 HOURS PRN
Status: DISCONTINUED | OUTPATIENT
Start: 2022-01-01 | End: 2022-01-01 | Stop reason: HOSPADM

## 2022-01-01 RX ORDER — AMOXICILLIN 250 MG
2 CAPSULE ORAL 2 TIMES DAILY PRN
Status: DISCONTINUED | OUTPATIENT
Start: 2022-01-01 | End: 2022-01-01

## 2022-01-01 RX ORDER — OMEPRAZOLE 40 MG/1
40 CAPSULE, DELAYED RELEASE ORAL DAILY
COMMUNITY
Start: 2022-01-01 | End: 2022-01-01

## 2022-01-01 RX ORDER — SODIUM BICARBONATE 650 MG/1
650 TABLET ORAL 3 TIMES DAILY
Status: DISCONTINUED | OUTPATIENT
Start: 2022-01-01 | End: 2022-01-01

## 2022-01-01 RX ORDER — ONDANSETRON 4 MG/1
4 TABLET, FILM COATED ORAL EVERY 4 HOURS PRN
Qty: 30 TABLET | Refills: 6 | Status: ON HOLD | OUTPATIENT
Start: 2022-01-01 | End: 2022-01-01

## 2022-01-01 RX ORDER — NOREPINEPHRINE BITARTRATE 0.03 MG/ML
0-30 INJECTION, SOLUTION INTRAVENOUS CONTINUOUS
Status: DISCONTINUED | OUTPATIENT
Start: 2022-01-01 | End: 2022-01-01

## 2022-01-01 RX ORDER — PREDNISONE 10 MG/1
TABLET ORAL
Qty: 11 TABLET | Refills: 0 | Status: SHIPPED | OUTPATIENT
Start: 2022-01-01 | End: 2022-01-01

## 2022-01-01 RX ORDER — AMLODIPINE BESYLATE 10 MG/1
10 TABLET ORAL DAILY
Status: DISCONTINUED | OUTPATIENT
Start: 2022-01-01 | End: 2022-01-01

## 2022-01-01 RX ORDER — MIDAZOLAM HYDROCHLORIDE 1 MG/ML
INJECTION INTRAMUSCULAR; INTRAVENOUS
Status: DISCONTINUED
Start: 2022-01-01 | End: 2022-01-01 | Stop reason: HOSPADM

## 2022-01-01 RX ORDER — SPIRONOLACTONE 25 MG/1
25 TABLET ORAL DAILY
Qty: 30 TABLET | Refills: 0 | Status: SHIPPED | OUTPATIENT
Start: 2022-01-01 | End: 2022-01-01 | Stop reason: SDUPTHER

## 2022-01-01 RX ORDER — SUCRALFATE ORAL 1 G/10ML
1 SUSPENSION ORAL EVERY 6 HOURS
Status: DISCONTINUED | OUTPATIENT
Start: 2022-01-01 | End: 2022-01-01

## 2022-01-01 RX ORDER — SODIUM CHLORIDE, SODIUM LACTATE, POTASSIUM CHLORIDE, CALCIUM CHLORIDE 600; 310; 30; 20 MG/100ML; MG/100ML; MG/100ML; MG/100ML
INJECTION, SOLUTION INTRAVENOUS
Status: DISCONTINUED | OUTPATIENT
Start: 2022-01-01 | End: 2022-01-01 | Stop reason: SURG

## 2022-01-01 RX ORDER — PROMETHAZINE HYDROCHLORIDE 25 MG/1
12.5-25 TABLET ORAL EVERY 4 HOURS PRN
Status: DISCONTINUED | OUTPATIENT
Start: 2022-01-01 | End: 2022-01-01 | Stop reason: HOSPADM

## 2022-01-01 RX ORDER — CLONAZEPAM 0.5 MG/1
2 TABLET ORAL 3 TIMES DAILY PRN
Status: DISCONTINUED | OUTPATIENT
Start: 2022-01-01 | End: 2022-01-01 | Stop reason: HOSPADM

## 2022-01-01 RX ORDER — METHYLPREDNISOLONE SODIUM SUCCINATE 125 MG/2ML
125 INJECTION, POWDER, LYOPHILIZED, FOR SOLUTION INTRAMUSCULAR; INTRAVENOUS ONCE
Status: COMPLETED | OUTPATIENT
Start: 2022-01-01 | End: 2022-01-01

## 2022-01-01 RX ORDER — SODIUM CHLORIDE, SODIUM LACTATE, POTASSIUM CHLORIDE, AND CALCIUM CHLORIDE .6; .31; .03; .02 G/100ML; G/100ML; G/100ML; G/100ML
1500 INJECTION, SOLUTION INTRAVENOUS ONCE
Status: COMPLETED | OUTPATIENT
Start: 2022-01-01 | End: 2022-01-01

## 2022-01-01 RX ORDER — LEVETIRACETAM 500 MG/1
500 TABLET ORAL 2 TIMES DAILY
Status: DISCONTINUED | OUTPATIENT
Start: 2022-01-01 | End: 2022-01-01 | Stop reason: HOSPADM

## 2022-01-01 RX ORDER — ALBUTEROL SULFATE 90 UG/1
2 AEROSOL, METERED RESPIRATORY (INHALATION)
Status: DISCONTINUED | OUTPATIENT
Start: 2022-01-01 | End: 2022-01-01 | Stop reason: HOSPADM

## 2022-01-01 RX ORDER — LOPERAMIDE HYDROCHLORIDE 2 MG/1
2 CAPSULE ORAL 4 TIMES DAILY PRN
Qty: 120 CAPSULE | Refills: 3 | Status: ON HOLD | OUTPATIENT
Start: 2022-01-01 | End: 2022-01-01

## 2022-01-01 RX ORDER — HYDROMORPHONE HYDROCHLORIDE 1 MG/ML
0.1 INJECTION, SOLUTION INTRAMUSCULAR; INTRAVENOUS; SUBCUTANEOUS
Status: DISCONTINUED | OUTPATIENT
Start: 2022-01-01 | End: 2022-01-01 | Stop reason: HOSPADM

## 2022-01-01 RX ORDER — LIDOCAINE HYDROCHLORIDE 10 MG/ML
INJECTION, SOLUTION EPIDURAL; INFILTRATION; INTRACAUDAL; PERINEURAL
Status: DISCONTINUED
Start: 2022-01-01 | End: 2022-01-01 | Stop reason: HOSPADM

## 2022-01-01 RX ORDER — LIDOCAINE HYDROCHLORIDE AND EPINEPHRINE BITARTRATE 20; .01 MG/ML; MG/ML
INJECTION, SOLUTION SUBCUTANEOUS
Status: COMPLETED
Start: 2022-01-01 | End: 2022-01-01

## 2022-01-01 RX ORDER — HALOPERIDOL 5 MG/ML
5 INJECTION INTRAMUSCULAR EVERY 4 HOURS PRN
Status: DISCONTINUED | OUTPATIENT
Start: 2022-01-01 | End: 2022-01-01 | Stop reason: HOSPADM

## 2022-01-01 RX ORDER — DIPHENOXYLATE HYDROCHLORIDE AND ATROPINE SULFATE 2.5; .025 MG/1; MG/1
1 TABLET ORAL 4 TIMES DAILY PRN
Status: ON HOLD | COMMUNITY
End: 2022-01-01

## 2022-01-01 RX ADMIN — ONDANSETRON 4 MG: 2 INJECTION INTRAMUSCULAR; INTRAVENOUS at 12:11

## 2022-01-01 RX ADMIN — HYDRALAZINE HYDROCHLORIDE 20 MG: 20 INJECTION INTRAMUSCULAR; INTRAVENOUS at 01:30

## 2022-01-01 RX ADMIN — SODIUM BICARBONATE 650 MG: 650 TABLET ORAL at 17:06

## 2022-01-01 RX ADMIN — LORAZEPAM 2 MG: 2 INJECTION INTRAMUSCULAR; INTRAVENOUS at 05:18

## 2022-01-01 RX ADMIN — HYDROMORPHONE HYDROCHLORIDE 2 MG: 2 INJECTION INTRAMUSCULAR; INTRAVENOUS; SUBCUTANEOUS at 01:25

## 2022-01-01 RX ADMIN — DEXTROSE MONOHYDRATE: 50 INJECTION, SOLUTION INTRAVENOUS at 20:25

## 2022-01-01 RX ADMIN — POTASSIUM CHLORIDE 10 MEQ: 7.46 INJECTION, SOLUTION INTRAVENOUS at 09:21

## 2022-01-01 RX ADMIN — LOPERAMIDE HYDROCHLORIDE 4 MG: 2 CAPSULE ORAL at 21:26

## 2022-01-01 RX ADMIN — PROPOFOL 30 MG: 10 INJECTION, EMULSION INTRAVENOUS at 13:10

## 2022-01-01 RX ADMIN — POTASSIUM CHLORIDE 10 MEQ: 7.46 INJECTION, SOLUTION INTRAVENOUS at 01:34

## 2022-01-01 RX ADMIN — METHYLPREDNISOLONE SODIUM SUCCINATE 20 MG: 40 INJECTION, POWDER, FOR SOLUTION INTRAMUSCULAR; INTRAVENOUS at 17:26

## 2022-01-01 RX ADMIN — AMLODIPINE BESYLATE 10 MG: 5 TABLET ORAL at 04:45

## 2022-01-01 RX ADMIN — HEPARIN 500 UNITS: 100 SYRINGE at 15:09

## 2022-01-01 RX ADMIN — Medication 100 MG: at 17:03

## 2022-01-01 RX ADMIN — ONDANSETRON 4 MG: 2 INJECTION INTRAMUSCULAR; INTRAVENOUS at 19:58

## 2022-01-01 RX ADMIN — LEVETIRACETAM 500 MG: 500 TABLET, FILM COATED ORAL at 18:31

## 2022-01-01 RX ADMIN — ACETAMINOPHEN 325 MG: 325 TABLET, FILM COATED ORAL at 08:26

## 2022-01-01 RX ADMIN — POTASSIUM CHLORIDE 10 MEQ: 7.46 INJECTION, SOLUTION INTRAVENOUS at 08:26

## 2022-01-01 RX ADMIN — ONDANSETRON 8 MG: 2 INJECTION INTRAMUSCULAR; INTRAVENOUS at 15:34

## 2022-01-01 RX ADMIN — ONDANSETRON 16 MG: 2 INJECTION INTRAMUSCULAR; INTRAVENOUS at 16:15

## 2022-01-01 RX ADMIN — AMPICILLIN AND SULBACTAM 3 G: 1; 2 INJECTION, POWDER, FOR SOLUTION INTRAMUSCULAR; INTRAVENOUS at 14:07

## 2022-01-01 RX ADMIN — DEXAMETHASONE SODIUM PHOSPHATE 12 MG: 4 INJECTION, SOLUTION INTRA-ARTICULAR; INTRALESIONAL; INTRAMUSCULAR; INTRAVENOUS; SOFT TISSUE at 15:50

## 2022-01-01 RX ADMIN — MORPHINE SULFATE 20 MG: 100 SOLUTION ORAL at 13:06

## 2022-01-01 RX ADMIN — OMEPRAZOLE 20 MG: 20 CAPSULE, DELAYED RELEASE ORAL at 18:32

## 2022-01-01 RX ADMIN — ONDANSETRON 8 MG: 8 TABLET, ORALLY DISINTEGRATING ORAL at 04:12

## 2022-01-01 RX ADMIN — Medication 100 MG: at 17:17

## 2022-01-01 RX ADMIN — POTASSIUM CHLORIDE 10 MEQ: 7.46 INJECTION, SOLUTION INTRAVENOUS at 08:10

## 2022-01-01 RX ADMIN — POTASSIUM CHLORIDE 10 MEQ: 7.46 INJECTION, SOLUTION INTRAVENOUS at 10:14

## 2022-01-01 RX ADMIN — METOPROLOL TARTRATE 5 MG: 1 INJECTION, SOLUTION INTRAVENOUS at 12:44

## 2022-01-01 RX ADMIN — LORAZEPAM 1 MG: 2 SOLUTION, CONCENTRATE ORAL at 10:38

## 2022-01-01 RX ADMIN — AMLODIPINE BESYLATE 10 MG: 10 TABLET ORAL at 05:20

## 2022-01-01 RX ADMIN — OXYCODONE 10 MG: 5 TABLET ORAL at 03:45

## 2022-01-01 RX ADMIN — POTASSIUM CHLORIDE 10 MEQ: 7.46 INJECTION, SOLUTION INTRAVENOUS at 16:59

## 2022-01-01 RX ADMIN — OMEPRAZOLE 40 MG: KIT at 17:23

## 2022-01-01 RX ADMIN — DIBASIC SODIUM PHOSPHATE, MONOBASIC POTASSIUM PHOSPHATE AND MONOBASIC SODIUM PHOSPHATE 250 MG: 852; 155; 130 TABLET ORAL at 12:00

## 2022-01-01 RX ADMIN — ONDANSETRON 4 MG: 2 INJECTION INTRAMUSCULAR; INTRAVENOUS at 11:50

## 2022-01-01 RX ADMIN — HYDRALAZINE HYDROCHLORIDE 20 MG: 20 INJECTION INTRAMUSCULAR; INTRAVENOUS at 05:37

## 2022-01-01 RX ADMIN — OMEPRAZOLE 40 MG: 20 CAPSULE, DELAYED RELEASE ORAL at 06:21

## 2022-01-01 RX ADMIN — PREDNISONE 40 MG: 20 TABLET ORAL at 08:00

## 2022-01-01 RX ADMIN — ACETAMINOPHEN 650 MG: 325 TABLET, FILM COATED ORAL at 06:52

## 2022-01-01 RX ADMIN — DOCUSATE SODIUM 50 MG AND SENNOSIDES 8.6 MG 2 TABLET: 8.6; 5 TABLET, FILM COATED ORAL at 06:10

## 2022-01-01 RX ADMIN — FENTANYL CITRATE 50 MCG: 50 INJECTION, SOLUTION INTRAMUSCULAR; INTRAVENOUS at 15:00

## 2022-01-01 RX ADMIN — GEMCITABINE 1960 MG: 2 INJECTION, POWDER, LYOPHILIZED, FOR SOLUTION INTRAVENOUS at 13:04

## 2022-01-01 RX ADMIN — LORAZEPAM 2 MG: 2 INJECTION INTRAMUSCULAR; INTRAVENOUS at 23:22

## 2022-01-01 RX ADMIN — HYDROMORPHONE HYDROCHLORIDE 2 MG: 2 INJECTION INTRAMUSCULAR; INTRAVENOUS; SUBCUTANEOUS at 09:39

## 2022-01-01 RX ADMIN — NOREPINEPHRINE BITARTRATE 12 MCG/MIN: 1 INJECTION, SOLUTION, CONCENTRATE INTRAVENOUS at 16:15

## 2022-01-01 RX ADMIN — NOREPINEPHRINE BITARTRATE 6 MCG/MIN: 1 INJECTION, SOLUTION, CONCENTRATE INTRAVENOUS at 16:00

## 2022-01-01 RX ADMIN — DEXAMETHASONE SODIUM PHOSPHATE 12 MG: 4 INJECTION, SOLUTION INTRA-ARTICULAR; INTRALESIONAL; INTRAMUSCULAR; INTRAVENOUS; SOFT TISSUE at 14:50

## 2022-01-01 RX ADMIN — HYDROCODONE BITARTRATE AND ACETAMINOPHEN 2 TABLET: 5; 325 TABLET ORAL at 16:31

## 2022-01-01 RX ADMIN — SODIUM CHLORIDE, POTASSIUM CHLORIDE, SODIUM LACTATE AND CALCIUM CHLORIDE: 600; 310; 30; 20 INJECTION, SOLUTION INTRAVENOUS at 10:26

## 2022-01-01 RX ADMIN — DEXMEDETOMIDINE 1 MCG/KG/HR: 200 INJECTION, SOLUTION INTRAVENOUS at 03:09

## 2022-01-01 RX ADMIN — CHOLESTYRAMINE 4 G: 4 POWDER, FOR SUSPENSION ORAL at 06:52

## 2022-01-01 RX ADMIN — Medication 100 MG: at 17:26

## 2022-01-01 RX ADMIN — AMPICILLIN AND SULBACTAM 3 G: 1; 2 INJECTION, POWDER, FOR SOLUTION INTRAMUSCULAR; INTRAVENOUS at 05:43

## 2022-01-01 RX ADMIN — HEPARIN SODIUM 5000 UNITS: 5000 INJECTION, SOLUTION INTRAVENOUS; SUBCUTANEOUS at 15:04

## 2022-01-01 RX ADMIN — LEVETIRACETAM 500 MG: 500 TABLET, FILM COATED ORAL at 00:55

## 2022-01-01 RX ADMIN — OXYCODONE HYDROCHLORIDE 10 MG: 10 TABLET ORAL at 11:52

## 2022-01-01 RX ADMIN — DEXTROSE MONOHYDRATE: 50 INJECTION, SOLUTION INTRAVENOUS at 04:52

## 2022-01-01 RX ADMIN — HUMAN ALBUMIN MICROSPHERES AND PERFLUTREN 3 ML: 10; .22 INJECTION, SOLUTION INTRAVENOUS at 11:46

## 2022-01-01 RX ADMIN — Medication 100 MG: at 16:47

## 2022-01-01 RX ADMIN — CLONAZEPAM 2 MG: 1 TABLET ORAL at 20:16

## 2022-01-01 RX ADMIN — OXYCODONE HYDROCHLORIDE 10 MG: 10 TABLET ORAL at 04:29

## 2022-01-01 RX ADMIN — MIDAZOLAM HYDROCHLORIDE 1 MG: 1 INJECTION, SOLUTION INTRAMUSCULAR; INTRAVENOUS at 09:03

## 2022-01-01 RX ADMIN — ONDANSETRON 8 MG: 8 TABLET, ORALLY DISINTEGRATING ORAL at 06:03

## 2022-01-01 RX ADMIN — ALTEPLASE 2 MG: 2.2 INJECTION, POWDER, LYOPHILIZED, FOR SOLUTION INTRAVENOUS at 13:52

## 2022-01-01 RX ADMIN — AMPICILLIN AND SULBACTAM 3 G: 1; 2 INJECTION, POWDER, FOR SOLUTION INTRAMUSCULAR; INTRAVENOUS at 12:03

## 2022-01-01 RX ADMIN — POTASSIUM CHLORIDE AND SODIUM CHLORIDE: 900; 150 INJECTION, SOLUTION INTRAVENOUS at 20:31

## 2022-01-01 RX ADMIN — LABETALOL HYDROCHLORIDE 10 MG: 5 INJECTION, SOLUTION INTRAVENOUS at 14:21

## 2022-01-01 RX ADMIN — MORPHINE SULFATE 20 MG: 100 SOLUTION ORAL at 08:28

## 2022-01-01 RX ADMIN — MIDAZOLAM HYDROCHLORIDE 5 MG: 1 INJECTION INTRAMUSCULAR; INTRAVENOUS at 11:51

## 2022-01-01 RX ADMIN — LIDOCAINE HYDROCHLORIDE 0.5 ML: 10 INJECTION, SOLUTION EPIDURAL; INFILTRATION; INTRACAUDAL; PERINEURAL at 11:00

## 2022-01-01 RX ADMIN — NYSTATIN: 100000 POWDER TOPICAL at 05:04

## 2022-01-01 RX ADMIN — CARISOPRODOL 350 MG: 350 TABLET ORAL at 09:20

## 2022-01-01 RX ADMIN — MIDAZOLAM HYDROCHLORIDE 0.5 MG: 1 INJECTION, SOLUTION INTRAMUSCULAR; INTRAVENOUS at 14:54

## 2022-01-01 RX ADMIN — DEXTROSE MONOHYDRATE: 50 INJECTION, SOLUTION INTRAVENOUS at 14:55

## 2022-01-01 RX ADMIN — SODIUM CHLORIDE 150 MG: 9 INJECTION, SOLUTION INTRAVENOUS at 17:05

## 2022-01-01 RX ADMIN — LIDOCAINE HYDROCHLORIDE 100 MG: 20 INJECTION, SOLUTION EPIDURAL; INFILTRATION; INTRACAUDAL at 10:31

## 2022-01-01 RX ADMIN — LORAZEPAM 2 MG: 2 INJECTION INTRAMUSCULAR; INTRAVENOUS at 13:58

## 2022-01-01 RX ADMIN — LORAZEPAM 2 MG: 2 INJECTION INTRAMUSCULAR; INTRAVENOUS at 02:27

## 2022-01-01 RX ADMIN — LEVETIRACETAM 500 MG: 500 TABLET, FILM COATED ORAL at 22:22

## 2022-01-01 RX ADMIN — POTASSIUM CHLORIDE 10 MEQ: 7.46 INJECTION, SOLUTION INTRAVENOUS at 08:46

## 2022-01-01 RX ADMIN — AMLODIPINE BESYLATE 10 MG: 5 TABLET ORAL at 05:05

## 2022-01-01 RX ADMIN — LORAZEPAM 1 MG: 2 INJECTION INTRAMUSCULAR; INTRAVENOUS at 15:51

## 2022-01-01 RX ADMIN — AMPICILLIN SODIUM AND SULBACTAM SODIUM 3 G: 2; 1 INJECTION, POWDER, FOR SOLUTION INTRAMUSCULAR; INTRAVENOUS at 12:43

## 2022-01-01 RX ADMIN — OXYCODONE HYDROCHLORIDE 10 MG: 10 TABLET ORAL at 16:43

## 2022-01-01 RX ADMIN — GEMCITABINE 1930 MG: 38 INJECTION, POWDER, LYOPHILIZED, FOR SOLUTION INTRAVENOUS at 16:35

## 2022-01-01 RX ADMIN — SODIUM CHLORIDE, POTASSIUM CHLORIDE, SODIUM LACTATE AND CALCIUM CHLORIDE 500 ML: 600; 310; 30; 20 INJECTION, SOLUTION INTRAVENOUS at 17:08

## 2022-01-01 RX ADMIN — ACETAMINOPHEN 325 MG: 325 TABLET, FILM COATED ORAL at 21:29

## 2022-01-01 RX ADMIN — LORAZEPAM 1 MG: 2 SOLUTION, CONCENTRATE ORAL at 22:48

## 2022-01-01 RX ADMIN — VANCOMYCIN HYDROCHLORIDE 125 MG: KIT ORAL at 11:51

## 2022-01-01 RX ADMIN — POTASSIUM CHLORIDE 40 MEQ: 1500 TABLET, EXTENDED RELEASE ORAL at 18:14

## 2022-01-01 RX ADMIN — SODIUM CHLORIDE, POTASSIUM CHLORIDE, SODIUM LACTATE AND CALCIUM CHLORIDE: 600; 310; 30; 20 INJECTION, SOLUTION INTRAVENOUS at 09:37

## 2022-01-01 RX ADMIN — LORAZEPAM 2 MG: 2 INJECTION INTRAMUSCULAR; INTRAVENOUS at 01:02

## 2022-01-01 RX ADMIN — POLYETHYLENE GLYCOL 3350, SODIUM SULFATE, SODIUM CHLORIDE, POTASSIUM CHLORIDE, ASCORBIC ACID, SODIUM ASCORBATE 100 G: KIT at 21:00

## 2022-01-01 RX ADMIN — ENOXAPARIN SODIUM 40 MG: 40 INJECTION SUBCUTANEOUS at 17:23

## 2022-01-01 RX ADMIN — CHOLESTYRAMINE 4 G: 4 POWDER, FOR SUSPENSION ORAL at 17:26

## 2022-01-01 RX ADMIN — HYDROMORPHONE HYDROCHLORIDE 2 MG: 2 INJECTION INTRAMUSCULAR; INTRAVENOUS; SUBCUTANEOUS at 17:39

## 2022-01-01 RX ADMIN — SUCRALFATE 1 G: 1 SUSPENSION ORAL at 23:30

## 2022-01-01 RX ADMIN — OXYCODONE HYDROCHLORIDE 10 MG: 10 TABLET ORAL at 11:28

## 2022-01-01 RX ADMIN — HYDROMORPHONE HYDROCHLORIDE 1 MG: 1 INJECTION, SOLUTION INTRAMUSCULAR; INTRAVENOUS; SUBCUTANEOUS at 21:02

## 2022-01-01 RX ADMIN — LEVETIRACETAM 500 MG: 500 TABLET, FILM COATED ORAL at 07:54

## 2022-01-01 RX ADMIN — DEXAMETHASONE SODIUM PHOSPHATE 12 MG: 4 INJECTION, SOLUTION INTRA-ARTICULAR; INTRALESIONAL; INTRAMUSCULAR; INTRAVENOUS; SOFT TISSUE at 16:00

## 2022-01-01 RX ADMIN — AMPICILLIN AND SULBACTAM 3 G: 1; 2 INJECTION, POWDER, FOR SOLUTION INTRAMUSCULAR; INTRAVENOUS at 17:26

## 2022-01-01 RX ADMIN — MORPHINE SULFATE 20 MG: 100 SOLUTION ORAL at 18:36

## 2022-01-01 RX ADMIN — HYDROMORPHONE HYDROCHLORIDE 2 MG: 2 INJECTION INTRAMUSCULAR; INTRAVENOUS; SUBCUTANEOUS at 02:01

## 2022-01-01 RX ADMIN — LOPERAMIDE HYDROCHLORIDE 2 MG: 2 CAPSULE ORAL at 13:32

## 2022-01-01 RX ADMIN — MAGNESIUM OXIDE TAB 400 MG (241.3 MG ELEMENTAL MG) 400 MG: 400 (241.3 MG) TAB at 13:14

## 2022-01-01 RX ADMIN — NALOXONE HYDROCHLORIDE 0.4 MG: 0.4 INJECTION, SOLUTION INTRAMUSCULAR; INTRAVENOUS; SUBCUTANEOUS at 20:24

## 2022-01-01 RX ADMIN — SODIUM CHLORIDE, POTASSIUM CHLORIDE, SODIUM LACTATE AND CALCIUM CHLORIDE: 600; 310; 30; 20 INJECTION, SOLUTION INTRAVENOUS at 12:40

## 2022-01-01 RX ADMIN — SODIUM BICARBONATE: 84 INJECTION, SOLUTION INTRAVENOUS at 21:00

## 2022-01-01 RX ADMIN — SUCRALFATE 1 G: 1 SUSPENSION ORAL at 23:47

## 2022-01-01 RX ADMIN — Medication 100 MG: at 17:24

## 2022-01-01 RX ADMIN — DEXAMETHASONE SODIUM PHOSPHATE 12 MG: 4 INJECTION, SOLUTION INTRA-ARTICULAR; INTRALESIONAL; INTRAMUSCULAR; INTRAVENOUS; SOFT TISSUE at 12:55

## 2022-01-01 RX ADMIN — IOHEXOL 100 ML: 350 INJECTION, SOLUTION INTRAVENOUS at 11:00

## 2022-01-01 RX ADMIN — LORAZEPAM 2 MG: 2 INJECTION INTRAMUSCULAR; INTRAVENOUS at 10:45

## 2022-01-01 RX ADMIN — AMLODIPINE BESYLATE 10 MG: 10 TABLET ORAL at 04:54

## 2022-01-01 RX ADMIN — POTASSIUM CHLORIDE 40 MEQ: 1500 TABLET, EXTENDED RELEASE ORAL at 17:04

## 2022-01-01 RX ADMIN — MAGNESIUM SULFATE HEPTAHYDRATE 3 G: 500 INJECTION, SOLUTION INTRAMUSCULAR; INTRAVENOUS at 06:29

## 2022-01-01 RX ADMIN — CHOLESTYRAMINE 4 G: 4 POWDER, FOR SUSPENSION ORAL at 05:32

## 2022-01-01 RX ADMIN — PROPOFOL 40 MG: 10 INJECTION, EMULSION INTRAVENOUS at 13:02

## 2022-01-01 RX ADMIN — HEPARIN 500 UNITS: 100 SYRINGE at 17:16

## 2022-01-01 RX ADMIN — PROPOFOL 25 MG: 10 INJECTION, EMULSION INTRAVENOUS at 13:04

## 2022-01-01 RX ADMIN — LORAZEPAM 2 MG: 2 INJECTION INTRAMUSCULAR; INTRAVENOUS at 05:39

## 2022-01-01 RX ADMIN — SODIUM BICARBONATE 650 MG: 650 TABLET ORAL at 12:28

## 2022-01-01 RX ADMIN — OXYCODONE 10 MG: 5 TABLET ORAL at 08:04

## 2022-01-01 RX ADMIN — DEXAMETHASONE SODIUM PHOSPHATE 12 MG: 4 INJECTION, SOLUTION INTRAMUSCULAR; INTRAVENOUS at 15:59

## 2022-01-01 RX ADMIN — FENTANYL CITRATE 100 MCG: 50 INJECTION, SOLUTION INTRAMUSCULAR; INTRAVENOUS at 00:43

## 2022-01-01 RX ADMIN — HYDROMORPHONE HYDROCHLORIDE 2 MG: 2 INJECTION INTRAMUSCULAR; INTRAVENOUS; SUBCUTANEOUS at 21:42

## 2022-01-01 RX ADMIN — ONDANSETRON 8 MG: 2 INJECTION INTRAMUSCULAR; INTRAVENOUS at 15:05

## 2022-01-01 RX ADMIN — LORAZEPAM 1 MG: 2 SOLUTION, CONCENTRATE ORAL at 02:08

## 2022-01-01 RX ADMIN — MIDAZOLAM HYDROCHLORIDE 0.5 MG: 1 INJECTION, SOLUTION INTRAMUSCULAR; INTRAVENOUS at 14:58

## 2022-01-01 RX ADMIN — LORAZEPAM 2 MG: 2 INJECTION INTRAMUSCULAR; INTRAVENOUS at 14:08

## 2022-01-01 RX ADMIN — KETAMINE HYDROCHLORIDE 150 MG: 50 INJECTION INTRAMUSCULAR; INTRAVENOUS at 15:30

## 2022-01-01 RX ADMIN — GADOTERIDOL 15 ML: 279.3 INJECTION, SOLUTION INTRAVENOUS at 17:01

## 2022-01-01 RX ADMIN — POTASSIUM CHLORIDE 10 MEQ: 7.46 INJECTION, SOLUTION INTRAVENOUS at 22:24

## 2022-01-01 RX ADMIN — LORAZEPAM 1 MG: 2 SOLUTION, CONCENTRATE ORAL at 08:23

## 2022-01-01 RX ADMIN — SODIUM CHLORIDE 400 MG: 9 INJECTION, SOLUTION INTRAVENOUS at 13:28

## 2022-01-01 RX ADMIN — POTASSIUM CHLORIDE 10 MEQ: 7.46 INJECTION, SOLUTION INTRAVENOUS at 10:25

## 2022-01-01 RX ADMIN — MORPHINE SULFATE 20 MG: 100 SOLUTION ORAL at 05:43

## 2022-01-01 RX ADMIN — POTASSIUM CHLORIDE 10 MEQ: 7.46 INJECTION, SOLUTION INTRAVENOUS at 12:30

## 2022-01-01 RX ADMIN — POTASSIUM CHLORIDE 10 MEQ: 7.46 INJECTION, SOLUTION INTRAVENOUS at 11:50

## 2022-01-01 RX ADMIN — LEVETIRACETAM 500 MG: 500 TABLET, FILM COATED ORAL at 18:00

## 2022-01-01 RX ADMIN — LEVETIRACETAM 500 MG: 500 TABLET, FILM COATED ORAL at 09:06

## 2022-01-01 RX ADMIN — LEVETIRACETAM 500 MG: 500 TABLET, FILM COATED ORAL at 17:46

## 2022-01-01 RX ADMIN — SODIUM BICARBONATE 650 MG: 650 TABLET ORAL at 17:46

## 2022-01-01 RX ADMIN — CARBOPLATIN 292 MG: 10 INJECTION, SOLUTION INTRAVENOUS at 17:02

## 2022-01-01 RX ADMIN — IOHEXOL 25 ML: 240 INJECTION, SOLUTION INTRATHECAL; INTRAVASCULAR; INTRAVENOUS; ORAL at 11:00

## 2022-01-01 RX ADMIN — HYDROMORPHONE HYDROCHLORIDE 2 MG: 2 INJECTION INTRAMUSCULAR; INTRAVENOUS; SUBCUTANEOUS at 10:10

## 2022-01-01 RX ADMIN — METOPROLOL TARTRATE 25 MG: 25 TABLET, FILM COATED ORAL at 05:21

## 2022-01-01 RX ADMIN — GEMCITABINE 1470 MG: 2 INJECTION, POWDER, LYOPHILIZED, FOR SOLUTION INTRAVENOUS at 17:59

## 2022-01-01 RX ADMIN — AMPICILLIN SODIUM AND SULBACTAM SODIUM 3 G: 2; 1 INJECTION, POWDER, FOR SOLUTION INTRAMUSCULAR; INTRAVENOUS at 12:00

## 2022-01-01 RX ADMIN — MORPHINE SULFATE 20 MG: 100 SOLUTION ORAL at 08:56

## 2022-01-01 RX ADMIN — POTASSIUM CHLORIDE 20 MEQ: 14.9 INJECTION, SOLUTION INTRAVENOUS at 15:37

## 2022-01-01 RX ADMIN — AMPICILLIN SODIUM AND SULBACTAM SODIUM 3 G: 2; 1 INJECTION, POWDER, FOR SOLUTION INTRAMUSCULAR; INTRAVENOUS at 18:37

## 2022-01-01 RX ADMIN — LORAZEPAM 2 MG: 2 INJECTION INTRAMUSCULAR; INTRAVENOUS at 17:46

## 2022-01-01 RX ADMIN — SUCRALFATE 1 G: 1 SUSPENSION ORAL at 12:11

## 2022-01-01 RX ADMIN — MAGNESIUM SULFATE HEPTAHYDRATE 3 G: 500 INJECTION, SOLUTION INTRAMUSCULAR; INTRAVENOUS at 03:59

## 2022-01-01 RX ADMIN — ONDANSETRON 16 MG: 2 INJECTION INTRAMUSCULAR; INTRAVENOUS at 16:02

## 2022-01-01 RX ADMIN — OXYCODONE HYDROCHLORIDE 10 MG: 10 TABLET ORAL at 17:27

## 2022-01-01 RX ADMIN — POTASSIUM CHLORIDE 20 MEQ: 14.9 INJECTION, SOLUTION INTRAVENOUS at 13:24

## 2022-01-01 RX ADMIN — METOPROLOL TARTRATE 25 MG: 25 TABLET, FILM COATED ORAL at 05:19

## 2022-01-01 RX ADMIN — POTASSIUM BICARBONATE 50 MEQ: 977.5 TABLET, EFFERVESCENT ORAL at 01:34

## 2022-01-01 RX ADMIN — METOPROLOL TARTRATE 25 MG: 25 TABLET, FILM COATED ORAL at 16:47

## 2022-01-01 RX ADMIN — HYDROCORTISONE SODIUM SUCCINATE 50 MG: 100 INJECTION, POWDER, FOR SOLUTION INTRAMUSCULAR; INTRAVENOUS at 12:00

## 2022-01-01 RX ADMIN — PREDNISONE 40 MG: 20 TABLET ORAL at 16:47

## 2022-01-01 RX ADMIN — Medication 100 MG: at 17:07

## 2022-01-01 RX ADMIN — DEXTROSE MONOHYDRATE 500 ML: 50 INJECTION, SOLUTION INTRAVENOUS at 06:32

## 2022-01-01 RX ADMIN — SODIUM CHLORIDE, POTASSIUM CHLORIDE, SODIUM LACTATE AND CALCIUM CHLORIDE 1000 ML: 600; 310; 30; 20 INJECTION, SOLUTION INTRAVENOUS at 12:31

## 2022-01-01 RX ADMIN — POTASSIUM CHLORIDE 40 MEQ: 1500 TABLET, EXTENDED RELEASE ORAL at 10:43

## 2022-01-01 RX ADMIN — OXYCODONE HYDROCHLORIDE 10 MG: 10 TABLET ORAL at 05:03

## 2022-01-01 RX ADMIN — POTASSIUM CHLORIDE 40 MEQ: 1500 TABLET, EXTENDED RELEASE ORAL at 06:21

## 2022-01-01 RX ADMIN — DEXTROSE MONOHYDRATE: 50 INJECTION, SOLUTION INTRAVENOUS at 22:28

## 2022-01-01 RX ADMIN — POTASSIUM CHLORIDE 40 MEQ: 1500 TABLET, EXTENDED RELEASE ORAL at 16:29

## 2022-01-01 RX ADMIN — LOPERAMIDE HCL 4 MG: 1 SOLUTION ORAL at 21:08

## 2022-01-01 RX ADMIN — METHYLPREDNISOLONE SODIUM SUCCINATE 20 MG: 40 INJECTION, POWDER, FOR SOLUTION INTRAMUSCULAR; INTRAVENOUS at 09:20

## 2022-01-01 RX ADMIN — PREDNISONE 40 MG: 20 TABLET ORAL at 07:59

## 2022-01-01 RX ADMIN — LOPERAMIDE HCL 4 MG: 1 SOLUTION ORAL at 08:37

## 2022-01-01 RX ADMIN — AMPICILLIN AND SULBACTAM 3 G: 1; 2 INJECTION, POWDER, FOR SOLUTION INTRAMUSCULAR; INTRAVENOUS at 17:27

## 2022-01-01 RX ADMIN — SODIUM CHLORIDE 150 MG: 9 INJECTION, SOLUTION INTRAVENOUS at 15:16

## 2022-01-01 RX ADMIN — PANTOPRAZOLE SODIUM 40 MG: 40 INJECTION, POWDER, FOR SOLUTION INTRAVENOUS at 04:07

## 2022-01-01 RX ADMIN — OMEPRAZOLE 20 MG: 20 CAPSULE, DELAYED RELEASE ORAL at 04:49

## 2022-01-01 RX ADMIN — CHOLESTYRAMINE 4 G: 4 POWDER, FOR SUSPENSION ORAL at 05:20

## 2022-01-01 RX ADMIN — FAMOTIDINE 20 MG: 20 TABLET, FILM COATED ORAL at 05:22

## 2022-01-01 RX ADMIN — PREDNISONE 40 MG: 20 TABLET ORAL at 17:02

## 2022-01-01 RX ADMIN — ONDANSETRON 16 MG: 2 INJECTION INTRAMUSCULAR; INTRAVENOUS at 11:00

## 2022-01-01 RX ADMIN — POTASSIUM CHLORIDE 40 MEQ: 1500 TABLET, EXTENDED RELEASE ORAL at 05:41

## 2022-01-01 RX ADMIN — MORPHINE SULFATE 20 MG: 100 SOLUTION ORAL at 07:41

## 2022-01-01 RX ADMIN — DIBASIC SODIUM PHOSPHATE, MONOBASIC POTASSIUM PHOSPHATE AND MONOBASIC SODIUM PHOSPHATE 250 MG: 852; 155; 130 TABLET ORAL at 06:17

## 2022-01-01 RX ADMIN — CHOLESTYRAMINE 4 G: 4 POWDER, FOR SUSPENSION ORAL at 17:37

## 2022-01-01 RX ADMIN — DEXTROSE MONOHYDRATE: 50 INJECTION, SOLUTION INTRAVENOUS at 06:32

## 2022-01-01 RX ADMIN — PREDNISONE 40 MG: 20 TABLET ORAL at 17:14

## 2022-01-01 RX ADMIN — SODIUM CHLORIDE 150 MG: 9 INJECTION, SOLUTION INTRAVENOUS at 16:40

## 2022-01-01 RX ADMIN — MORPHINE SULFATE 20 MG: 100 SOLUTION ORAL at 14:19

## 2022-01-01 RX ADMIN — METOPROLOL TARTRATE 25 MG: 25 TABLET, FILM COATED ORAL at 17:02

## 2022-01-01 RX ADMIN — MIDAZOLAM HYDROCHLORIDE 1 MG: 1 INJECTION, SOLUTION INTRAMUSCULAR; INTRAVENOUS at 15:03

## 2022-01-01 RX ADMIN — LORAZEPAM 2 MG: 2 INJECTION INTRAMUSCULAR; INTRAVENOUS at 21:02

## 2022-01-01 RX ADMIN — HYDROMORPHONE HYDROCHLORIDE 1 MG: 1 INJECTION, SOLUTION INTRAMUSCULAR; INTRAVENOUS; SUBCUTANEOUS at 05:18

## 2022-01-01 RX ADMIN — ONDANSETRON 4 MG: 2 INJECTION INTRAMUSCULAR; INTRAVENOUS at 09:34

## 2022-01-01 RX ADMIN — MORPHINE SULFATE 20 MG: 100 SOLUTION ORAL at 16:21

## 2022-01-01 RX ADMIN — FAMOTIDINE 20 MG: 10 INJECTION INTRAVENOUS at 18:31

## 2022-01-01 RX ADMIN — DIPHENOXYLATE HYDROCHLORIDE AND ATROPINE SULFATE 5 ML: 2.5; .025 SOLUTION ORAL at 08:00

## 2022-01-01 RX ADMIN — FENTANYL CITRATE 10 MCG: 50 INJECTION, SOLUTION INTRAMUSCULAR; INTRAVENOUS at 13:22

## 2022-01-01 RX ADMIN — HYDROMORPHONE HYDROCHLORIDE 2 MG: 2 INJECTION INTRAMUSCULAR; INTRAVENOUS; SUBCUTANEOUS at 10:09

## 2022-01-01 RX ADMIN — SODIUM BICARBONATE 650 MG: 650 TABLET ORAL at 17:24

## 2022-01-01 RX ADMIN — AMLODIPINE BESYLATE 10 MG: 10 TABLET ORAL at 05:32

## 2022-01-01 RX ADMIN — ONDANSETRON 4 MG: 4 TABLET, ORALLY DISINTEGRATING ORAL at 17:50

## 2022-01-01 RX ADMIN — POTASSIUM CHLORIDE 10 MEQ: 7.46 INJECTION, SOLUTION INTRAVENOUS at 06:24

## 2022-01-01 RX ADMIN — MAGNESIUM OXIDE TAB 400 MG (241.3 MG ELEMENTAL MG) 400 MG: 400 (241.3 MG) TAB at 18:31

## 2022-01-01 RX ADMIN — HYDROMORPHONE HYDROCHLORIDE 2 MG: 2 INJECTION INTRAMUSCULAR; INTRAVENOUS; SUBCUTANEOUS at 17:33

## 2022-01-01 RX ADMIN — ONDANSETRON 4 MG: 2 INJECTION INTRAMUSCULAR; INTRAVENOUS at 20:20

## 2022-01-01 RX ADMIN — OMEPRAZOLE 20 MG: 20 CAPSULE, DELAYED RELEASE ORAL at 21:28

## 2022-01-01 RX ADMIN — ENOXAPARIN SODIUM 40 MG: 40 INJECTION SUBCUTANEOUS at 17:46

## 2022-01-01 RX ADMIN — LORAZEPAM 1 MG: 2 SOLUTION, CONCENTRATE ORAL at 21:55

## 2022-01-01 RX ADMIN — ONDANSETRON 4 MG: 2 INJECTION INTRAMUSCULAR; INTRAVENOUS at 21:53

## 2022-01-01 RX ADMIN — SODIUM BICARBONATE 650 MG: 650 TABLET ORAL at 17:02

## 2022-01-01 RX ADMIN — AMPICILLIN AND SULBACTAM 3 G: 1; 2 INJECTION, POWDER, FOR SOLUTION INTRAMUSCULAR; INTRAVENOUS at 04:46

## 2022-01-01 RX ADMIN — POTASSIUM BICARBONATE 50 MEQ: 977.5 TABLET, EFFERVESCENT ORAL at 03:32

## 2022-01-01 RX ADMIN — POTASSIUM CHLORIDE 10 MEQ: 7.46 INJECTION, SOLUTION INTRAVENOUS at 01:01

## 2022-01-01 RX ADMIN — HYDROMORPHONE HYDROCHLORIDE 1 MG: 1 INJECTION, SOLUTION INTRAMUSCULAR; INTRAVENOUS; SUBCUTANEOUS at 21:03

## 2022-01-01 RX ADMIN — LOPERAMIDE HYDROCHLORIDE 2 MG: 2 CAPSULE ORAL at 17:49

## 2022-01-01 RX ADMIN — OXYCODONE 10 MG: 5 TABLET ORAL at 09:52

## 2022-01-01 RX ADMIN — ONDANSETRON HYDROCHLORIDE 16 MG: 2 INJECTION, SOLUTION INTRAMUSCULAR; INTRAVENOUS at 13:21

## 2022-01-01 RX ADMIN — POTASSIUM PHOSPHATE, MONOBASIC AND POTASSIUM PHOSPHATE, DIBASIC 30 MMOL: 224; 236 INJECTION, SOLUTION, CONCENTRATE INTRAVENOUS at 11:06

## 2022-01-01 RX ADMIN — CHOLESTYRAMINE 4 G: 4 POWDER, FOR SUSPENSION ORAL at 04:48

## 2022-01-01 RX ADMIN — HEPARIN 300 UNITS: 100 SYRINGE at 22:45

## 2022-01-01 RX ADMIN — POTASSIUM CHLORIDE 40 MEQ: 1500 TABLET, EXTENDED RELEASE ORAL at 05:08

## 2022-01-01 RX ADMIN — PREDNISONE 40 MG: 20 TABLET ORAL at 08:26

## 2022-01-01 RX ADMIN — SODIUM BICARBONATE 650 MG: 650 TABLET ORAL at 04:53

## 2022-01-01 RX ADMIN — CLONAZEPAM 2 MG: 1 TABLET ORAL at 17:11

## 2022-01-01 RX ADMIN — ACETAMINOPHEN 325 MG: 325 TABLET, FILM COATED ORAL at 10:09

## 2022-01-01 RX ADMIN — DEXTROSE MONOHYDRATE: 50 INJECTION, SOLUTION INTRAVENOUS at 19:51

## 2022-01-01 RX ADMIN — ENOXAPARIN SODIUM 40 MG: 40 INJECTION SUBCUTANEOUS at 17:26

## 2022-01-01 RX ADMIN — SODIUM CHLORIDE 1000 ML: 9 INJECTION, SOLUTION INTRAVENOUS at 15:00

## 2022-01-01 RX ADMIN — AMPICILLIN SODIUM AND SULBACTAM SODIUM 3 G: 2; 1 INJECTION, POWDER, FOR SOLUTION INTRAMUSCULAR; INTRAVENOUS at 23:55

## 2022-01-01 RX ADMIN — SODIUM CHLORIDE, POTASSIUM CHLORIDE, SODIUM LACTATE AND CALCIUM CHLORIDE 1000 ML: 600; 310; 30; 20 INJECTION, SOLUTION INTRAVENOUS at 17:00

## 2022-01-01 RX ADMIN — LIDOCAINE HYDROCHLORIDE AND EPINEPHRINE: 20; 10 INJECTION, SOLUTION INFILTRATION; PERINEURAL at 14:50

## 2022-01-01 RX ADMIN — PROPOFOL 5 MCG/KG/MIN: 10 INJECTION, EMULSION INTRAVENOUS at 16:55

## 2022-01-01 RX ADMIN — POTASSIUM CHLORIDE 40 MEQ: 1500 TABLET, EXTENDED RELEASE ORAL at 05:03

## 2022-01-01 RX ADMIN — CARBOPLATIN 280 MG: 10 INJECTION, SOLUTION INTRAVENOUS at 12:15

## 2022-01-01 RX ADMIN — SODIUM BICARBONATE 150 MEQ: 84 INJECTION INTRAVENOUS at 17:19

## 2022-01-01 RX ADMIN — VANCOMYCIN HYDROCHLORIDE 125 MG: KIT ORAL at 00:04

## 2022-01-01 RX ADMIN — DEXAMETHASONE SODIUM PHOSPHATE 12 MG: 4 INJECTION, SOLUTION INTRA-ARTICULAR; INTRALESIONAL; INTRAMUSCULAR; INTRAVENOUS; SOFT TISSUE at 15:34

## 2022-01-01 RX ADMIN — HYDROMORPHONE HYDROCHLORIDE 2 MG: 2 INJECTION INTRAMUSCULAR; INTRAVENOUS; SUBCUTANEOUS at 13:35

## 2022-01-01 RX ADMIN — ONDANSETRON HYDROCHLORIDE 16 MG: 2 INJECTION, SOLUTION INTRAMUSCULAR; INTRAVENOUS at 16:23

## 2022-01-01 RX ADMIN — LORAZEPAM 2 MG: 2 INJECTION INTRAMUSCULAR; INTRAVENOUS at 01:26

## 2022-01-01 RX ADMIN — LOPERAMIDE HCL 4 MG: 1 SOLUTION ORAL at 17:00

## 2022-01-01 RX ADMIN — SODIUM BICARBONATE 650 MG: 650 TABLET ORAL at 16:47

## 2022-01-01 RX ADMIN — OXYCODONE 10 MG: 5 TABLET ORAL at 14:12

## 2022-01-01 RX ADMIN — LEVETIRACETAM 500 MG: 500 TABLET, FILM COATED ORAL at 05:45

## 2022-01-01 RX ADMIN — MORPHINE SULFATE 20 MG: 100 SOLUTION ORAL at 17:49

## 2022-01-01 RX ADMIN — FENTANYL CITRATE 100 MCG: 50 INJECTION, SOLUTION INTRAMUSCULAR; INTRAVENOUS at 03:28

## 2022-01-01 RX ADMIN — MORPHINE SULFATE 20 MG: 100 SOLUTION ORAL at 04:47

## 2022-01-01 RX ADMIN — Medication 100 MG: at 17:36

## 2022-01-01 RX ADMIN — MIDAZOLAM HYDROCHLORIDE 1 MG: 1 INJECTION, SOLUTION INTRAMUSCULAR; INTRAVENOUS at 14:50

## 2022-01-01 RX ADMIN — ONDANSETRON 4 MG: 2 INJECTION INTRAMUSCULAR; INTRAVENOUS at 15:01

## 2022-01-01 RX ADMIN — NYSTATIN: 100000 POWDER TOPICAL at 06:21

## 2022-01-01 RX ADMIN — OXYCODONE HYDROCHLORIDE 10 MG: 10 TABLET ORAL at 19:09

## 2022-01-01 RX ADMIN — HYDROCORTISONE SODIUM SUCCINATE 50 MG: 100 INJECTION, POWDER, FOR SOLUTION INTRAMUSCULAR; INTRAVENOUS at 05:23

## 2022-01-01 RX ADMIN — ACETAMINOPHEN 650 MG: 325 TABLET, FILM COATED ORAL at 23:16

## 2022-01-01 RX ADMIN — MAGNESIUM SULFATE HEPTAHYDRATE 2 G: 40 INJECTION, SOLUTION INTRAVENOUS at 10:20

## 2022-01-01 RX ADMIN — METOPROLOL TARTRATE 25 MG: 25 TABLET, FILM COATED ORAL at 04:02

## 2022-01-01 RX ADMIN — Medication 3 ML: at 14:13

## 2022-01-01 RX ADMIN — POTASSIUM CHLORIDE 40 MEQ: 1500 TABLET, EXTENDED RELEASE ORAL at 06:17

## 2022-01-01 RX ADMIN — POTASSIUM CHLORIDE 40 MEQ: 1500 TABLET, EXTENDED RELEASE ORAL at 13:16

## 2022-01-01 RX ADMIN — HYDROMORPHONE HYDROCHLORIDE 1 MG: 1 INJECTION, SOLUTION INTRAMUSCULAR; INTRAVENOUS; SUBCUTANEOUS at 01:02

## 2022-01-01 RX ADMIN — INSULIN HUMAN 1 UNITS: 100 INJECTION, SOLUTION PARENTERAL at 17:30

## 2022-01-01 RX ADMIN — ONDANSETRON 8 MG: 2 INJECTION INTRAMUSCULAR; INTRAVENOUS at 15:58

## 2022-01-01 RX ADMIN — SODIUM CHLORIDE: 9 INJECTION, SOLUTION INTRAVENOUS at 10:37

## 2022-01-01 RX ADMIN — POTASSIUM CHLORIDE 10 MEQ: 7.46 INJECTION, SOLUTION INTRAVENOUS at 12:12

## 2022-01-01 RX ADMIN — LEVETIRACETAM 500 MG: 500 TABLET, FILM COATED ORAL at 22:44

## 2022-01-01 RX ADMIN — HEPARIN 500 UNITS: 100 SYRINGE at 14:05

## 2022-01-01 RX ADMIN — DEXAMETHASONE SODIUM PHOSPHATE 12 MG: 4 INJECTION, SOLUTION INTRAMUSCULAR; INTRAVENOUS at 15:05

## 2022-01-01 RX ADMIN — LORAZEPAM 1 MG: 2 SOLUTION, CONCENTRATE ORAL at 14:18

## 2022-01-01 RX ADMIN — LORAZEPAM 1 MG: 2 SOLUTION, CONCENTRATE ORAL at 15:23

## 2022-01-01 RX ADMIN — ONDANSETRON 4 MG: 2 INJECTION INTRAMUSCULAR; INTRAVENOUS at 03:26

## 2022-01-01 RX ADMIN — ONDANSETRON 8 MG: 8 TABLET, ORALLY DISINTEGRATING ORAL at 14:17

## 2022-01-01 RX ADMIN — PROPOFOL 5 MCG/KG/MIN: 10 INJECTION, EMULSION INTRAVENOUS at 00:44

## 2022-01-01 RX ADMIN — CARBOPLATIN 280 MG: 450 INJECTION, SOLUTION INTRAVENOUS at 16:45

## 2022-01-01 RX ADMIN — HYDROMORPHONE HYDROCHLORIDE 2 MG: 2 INJECTION INTRAMUSCULAR; INTRAVENOUS; SUBCUTANEOUS at 09:47

## 2022-01-01 RX ADMIN — LORAZEPAM 2 MG: 2 INJECTION INTRAMUSCULAR; INTRAVENOUS at 18:13

## 2022-01-01 RX ADMIN — LOPERAMIDE HYDROCHLORIDE 4 MG: 2 CAPSULE ORAL at 17:05

## 2022-01-01 RX ADMIN — HYDROMORPHONE HYDROCHLORIDE 2 MG: 2 INJECTION INTRAMUSCULAR; INTRAVENOUS; SUBCUTANEOUS at 17:21

## 2022-01-01 RX ADMIN — ONDANSETRON 8 MG: 8 TABLET, ORALLY DISINTEGRATING ORAL at 21:08

## 2022-01-01 RX ADMIN — CHOLESTYRAMINE 4 G: 4 POWDER, FOR SUSPENSION ORAL at 06:29

## 2022-01-01 RX ADMIN — SODIUM BICARBONATE 650 MG: 650 TABLET ORAL at 17:17

## 2022-01-01 RX ADMIN — INSULIN HUMAN 1 UNITS: 100 INJECTION, SOLUTION PARENTERAL at 01:16

## 2022-01-01 RX ADMIN — LEVETIRACETAM 500 MG: 500 TABLET, FILM COATED ORAL at 08:58

## 2022-01-01 RX ADMIN — METOPROLOL TARTRATE 25 MG: 25 TABLET, FILM COATED ORAL at 04:44

## 2022-01-01 RX ADMIN — LOPERAMIDE HYDROCHLORIDE 4 MG: 2 CAPSULE ORAL at 20:15

## 2022-01-01 RX ADMIN — MORPHINE SULFATE 20 MG: 100 SOLUTION ORAL at 15:25

## 2022-01-01 RX ADMIN — LORAZEPAM 2 MG: 2 INJECTION INTRAMUSCULAR; INTRAVENOUS at 17:40

## 2022-01-01 RX ADMIN — ONDANSETRON 4 MG: 2 INJECTION INTRAMUSCULAR; INTRAVENOUS at 20:19

## 2022-01-01 RX ADMIN — ENOXAPARIN SODIUM 40 MG: 40 INJECTION SUBCUTANEOUS at 16:50

## 2022-01-01 RX ADMIN — LABETALOL HYDROCHLORIDE 10 MG: 5 INJECTION, SOLUTION INTRAVENOUS at 00:54

## 2022-01-01 RX ADMIN — POTASSIUM CHLORIDE 40 MEQ: 1500 TABLET, EXTENDED RELEASE ORAL at 05:21

## 2022-01-01 RX ADMIN — FENTANYL CITRATE 50 MCG: 50 INJECTION, SOLUTION INTRAMUSCULAR; INTRAVENOUS at 14:50

## 2022-01-01 RX ADMIN — POTASSIUM CHLORIDE 40 MEQ: 1500 TABLET, EXTENDED RELEASE ORAL at 18:38

## 2022-01-01 RX ADMIN — ACETAMINOPHEN 325 MG: 325 TABLET, FILM COATED ORAL at 20:56

## 2022-01-01 RX ADMIN — HYDROMORPHONE HYDROCHLORIDE 0.5 MG: 1 INJECTION, SOLUTION INTRAMUSCULAR; INTRAVENOUS; SUBCUTANEOUS at 09:00

## 2022-01-01 RX ADMIN — PROPOFOL 40 MG: 10 INJECTION, EMULSION INTRAVENOUS at 12:46

## 2022-01-01 RX ADMIN — HYDRALAZINE HYDROCHLORIDE 20 MG: 20 INJECTION INTRAMUSCULAR; INTRAVENOUS at 01:26

## 2022-01-01 RX ADMIN — MAGNESIUM SULFATE HEPTAHYDRATE 2 G: 40 INJECTION, SOLUTION INTRAVENOUS at 01:37

## 2022-01-01 RX ADMIN — POTASSIUM CHLORIDE 10 MEQ: 7.46 INJECTION, SOLUTION INTRAVENOUS at 20:46

## 2022-01-01 RX ADMIN — HYDROMORPHONE HYDROCHLORIDE 2 MG: 2 INJECTION INTRAMUSCULAR; INTRAVENOUS; SUBCUTANEOUS at 02:11

## 2022-01-01 RX ADMIN — METOPROLOL TARTRATE 25 MG: 25 TABLET, FILM COATED ORAL at 17:36

## 2022-01-01 RX ADMIN — FLUOXETINE 20 MG: 20 CAPSULE ORAL at 06:11

## 2022-01-01 RX ADMIN — HYDROCORTISONE SODIUM SUCCINATE 50 MG: 100 INJECTION, POWDER, FOR SOLUTION INTRAMUSCULAR; INTRAVENOUS at 05:21

## 2022-01-01 RX ADMIN — HYDROMORPHONE HYDROCHLORIDE 2 MG: 2 INJECTION INTRAMUSCULAR; INTRAVENOUS; SUBCUTANEOUS at 05:18

## 2022-01-01 RX ADMIN — MAGNESIUM SULFATE 2 G: 2 INJECTION INTRAVENOUS at 09:37

## 2022-01-01 RX ADMIN — CARBOPLATIN 282 MG: 10 INJECTION, SOLUTION INTRAVENOUS at 16:31

## 2022-01-01 RX ADMIN — HYDROMORPHONE HYDROCHLORIDE 0.5 MG: 1 INJECTION, SOLUTION INTRAMUSCULAR; INTRAVENOUS; SUBCUTANEOUS at 09:53

## 2022-01-01 RX ADMIN — SODIUM BICARBONATE 650 MG: 650 TABLET ORAL at 17:14

## 2022-01-01 RX ADMIN — POTASSIUM CHLORIDE 10 MEQ: 7.46 INJECTION, SOLUTION INTRAVENOUS at 07:58

## 2022-01-01 RX ADMIN — LORAZEPAM 2 MG: 2 INJECTION INTRAMUSCULAR; INTRAVENOUS at 13:35

## 2022-01-01 RX ADMIN — CLONAZEPAM 2 MG: 1 TABLET ORAL at 11:29

## 2022-01-01 RX ADMIN — LORAZEPAM 2 MG: 2 INJECTION INTRAMUSCULAR; INTRAVENOUS at 20:33

## 2022-01-01 RX ADMIN — OXYCODONE HYDROCHLORIDE 10 MG: 5 SOLUTION ORAL at 13:52

## 2022-01-01 RX ADMIN — FENTANYL CITRATE 50 MCG: 50 INJECTION INTRAMUSCULAR; INTRAVENOUS at 09:03

## 2022-01-01 RX ADMIN — HEPARIN 500 UNITS: 100 SYRINGE at 16:57

## 2022-01-01 RX ADMIN — POTASSIUM CHLORIDE 40 MEQ: 1500 TABLET, EXTENDED RELEASE ORAL at 17:27

## 2022-01-01 RX ADMIN — DOCUSATE SODIUM 50 MG AND SENNOSIDES 8.6 MG 2 TABLET: 8.6; 5 TABLET, FILM COATED ORAL at 17:03

## 2022-01-01 RX ADMIN — SODIUM CHLORIDE 400 MG: 9 INJECTION, SOLUTION INTRAVENOUS at 16:31

## 2022-01-01 RX ADMIN — DIBASIC SODIUM PHOSPHATE, MONOBASIC POTASSIUM PHOSPHATE AND MONOBASIC SODIUM PHOSPHATE 250 MG: 852; 155; 130 TABLET ORAL at 22:53

## 2022-01-01 RX ADMIN — POTASSIUM CHLORIDE 40 MEQ: 1500 TABLET, EXTENDED RELEASE ORAL at 15:50

## 2022-01-01 RX ADMIN — FENTANYL CITRATE 25 MCG: 50 INJECTION, SOLUTION INTRAMUSCULAR; INTRAVENOUS at 14:54

## 2022-01-01 RX ADMIN — LORAZEPAM 1 MG: 2 SOLUTION, CONCENTRATE ORAL at 07:41

## 2022-01-01 RX ADMIN — HYDRALAZINE HYDROCHLORIDE 20 MG: 20 INJECTION INTRAMUSCULAR; INTRAVENOUS at 21:19

## 2022-01-01 RX ADMIN — PANTOPRAZOLE SODIUM 40 MG: 40 INJECTION, POWDER, LYOPHILIZED, FOR SOLUTION INTRAVENOUS at 18:00

## 2022-01-01 RX ADMIN — MAGNESIUM SULFATE HEPTAHYDRATE 2 G: 40 INJECTION, SOLUTION INTRAVENOUS at 21:32

## 2022-01-01 RX ADMIN — AMLODIPINE BESYLATE 10 MG: 10 TABLET ORAL at 06:52

## 2022-01-01 RX ADMIN — POLYETHYLENE GLYCOL 3350, SODIUM SULFATE, SODIUM CHLORIDE, POTASSIUM CHLORIDE, ASCORBIC ACID, SODIUM ASCORBATE 100 G: KIT at 18:18

## 2022-01-01 RX ADMIN — Medication 100 MG: at 17:46

## 2022-01-01 RX ADMIN — HYDROMORPHONE HYDROCHLORIDE 2 MG: 2 INJECTION INTRAMUSCULAR; INTRAVENOUS; SUBCUTANEOUS at 22:03

## 2022-01-01 RX ADMIN — GEMCITABINE HYDROCHLORIDE 1447 MG: 200 INJECTION, POWDER, LYOPHILIZED, FOR SOLUTION INTRAVENOUS at 17:40

## 2022-01-01 RX ADMIN — POTASSIUM CHLORIDE 20 MEQ: 14.9 INJECTION, SOLUTION INTRAVENOUS at 20:09

## 2022-01-01 RX ADMIN — LORAZEPAM 2 MG: 2 INJECTION INTRAMUSCULAR; INTRAVENOUS at 09:39

## 2022-01-01 RX ADMIN — LORAZEPAM 2 MG: 2 INJECTION INTRAMUSCULAR; INTRAVENOUS at 21:42

## 2022-01-01 RX ADMIN — POTASSIUM CHLORIDE 10 MEQ: 7.45 INJECTION INTRAVENOUS at 04:04

## 2022-01-01 RX ADMIN — HEPARIN SODIUM 5000 UNITS: 5000 INJECTION, SOLUTION INTRAVENOUS; SUBCUTANEOUS at 05:03

## 2022-01-01 RX ADMIN — CEFAZOLIN SODIUM 2 G: 2 INJECTION, SOLUTION INTRAVENOUS at 14:50

## 2022-01-01 RX ADMIN — LOPERAMIDE HYDROCHLORIDE 4 MG: 2 CAPSULE ORAL at 21:06

## 2022-01-01 RX ADMIN — LORAZEPAM 2 MG: 2 INJECTION INTRAMUSCULAR; INTRAVENOUS at 18:05

## 2022-01-01 RX ADMIN — LEVETIRACETAM 500 MG: 500 TABLET, FILM COATED ORAL at 10:10

## 2022-01-01 RX ADMIN — POTASSIUM CHLORIDE 40 MEQ: 1500 TABLET, EXTENDED RELEASE ORAL at 17:05

## 2022-01-01 RX ADMIN — LORAZEPAM 1 MG: 2 SOLUTION, CONCENTRATE ORAL at 16:58

## 2022-01-01 RX ADMIN — METHYLPREDNISOLONE SODIUM SUCCINATE 20 MG: 40 INJECTION, POWDER, FOR SOLUTION INTRAMUSCULAR; INTRAVENOUS at 18:14

## 2022-01-01 RX ADMIN — GEMCITABINE HYDROCHLORIDE 1477 MG: 200 INJECTION, POWDER, LYOPHILIZED, FOR SOLUTION INTRAVENOUS at 18:04

## 2022-01-01 RX ADMIN — Medication 100 MG: at 16:51

## 2022-01-01 RX ADMIN — METOPROLOL TARTRATE 25 MG: 25 TABLET, FILM COATED ORAL at 05:32

## 2022-01-01 RX ADMIN — AMLODIPINE BESYLATE 10 MG: 5 TABLET ORAL at 04:56

## 2022-01-01 RX ADMIN — CARBOPLATIN 280 MG: 10 INJECTION, SOLUTION INTRAVENOUS at 14:21

## 2022-01-01 RX ADMIN — SODIUM CHLORIDE, POTASSIUM CHLORIDE, SODIUM LACTATE AND CALCIUM CHLORIDE 1000 ML: 600; 310; 30; 20 INJECTION, SOLUTION INTRAVENOUS at 08:31

## 2022-01-01 RX ADMIN — AMPICILLIN AND SULBACTAM 3 G: 1; 2 INJECTION, POWDER, FOR SOLUTION INTRAMUSCULAR; INTRAVENOUS at 16:17

## 2022-01-01 RX ADMIN — SODIUM CHLORIDE, POTASSIUM CHLORIDE, SODIUM LACTATE AND CALCIUM CHLORIDE 1500 ML: 600; 310; 30; 20 INJECTION, SOLUTION INTRAVENOUS at 14:10

## 2022-01-01 RX ADMIN — POTASSIUM CHLORIDE 40 MEQ: 1500 TABLET, EXTENDED RELEASE ORAL at 05:19

## 2022-01-01 RX ADMIN — HYDROMORPHONE HYDROCHLORIDE 2 MG: 2 INJECTION INTRAMUSCULAR; INTRAVENOUS; SUBCUTANEOUS at 15:09

## 2022-01-01 RX ADMIN — HYDROCORTISONE SODIUM SUCCINATE 50 MG: 100 INJECTION, POWDER, FOR SOLUTION INTRAMUSCULAR; INTRAVENOUS at 23:54

## 2022-01-01 RX ADMIN — HEPARIN 500 UNITS: 100 SYRINGE at 10:22

## 2022-01-01 RX ADMIN — DIBASIC SODIUM PHOSPHATE, MONOBASIC POTASSIUM PHOSPHATE AND MONOBASIC SODIUM PHOSPHATE 250 MG: 852; 155; 130 TABLET ORAL at 22:58

## 2022-01-01 RX ADMIN — MORPHINE SULFATE 20 MG: 100 SOLUTION ORAL at 13:51

## 2022-01-01 RX ADMIN — PREDNISONE 40 MG: 20 TABLET ORAL at 17:36

## 2022-01-01 RX ADMIN — OMEPRAZOLE 40 MG: 20 CAPSULE, DELAYED RELEASE ORAL at 06:12

## 2022-01-01 RX ADMIN — ONDANSETRON 4 MG: 2 INJECTION INTRAMUSCULAR; INTRAVENOUS at 08:30

## 2022-01-01 RX ADMIN — VANCOMYCIN HYDROCHLORIDE 125 MG: KIT ORAL at 18:07

## 2022-01-01 RX ADMIN — SODIUM BICARBONATE 650 MG: 650 TABLET ORAL at 12:23

## 2022-01-01 RX ADMIN — LORAZEPAM 1 MG: 2 SOLUTION, CONCENTRATE ORAL at 13:09

## 2022-01-01 RX ADMIN — LEVETIRACETAM 1000 MG: 100 INJECTION, SOLUTION INTRAVENOUS at 05:25

## 2022-01-01 RX ADMIN — AMLODIPINE BESYLATE 10 MG: 10 TABLET ORAL at 04:25

## 2022-01-01 RX ADMIN — METOPROLOL TARTRATE 25 MG: 25 TABLET, FILM COATED ORAL at 04:54

## 2022-01-01 RX ADMIN — HYDROMORPHONE HYDROCHLORIDE 2 MG: 2 INJECTION INTRAMUSCULAR; INTRAVENOUS; SUBCUTANEOUS at 02:27

## 2022-01-01 RX ADMIN — FLUOXETINE 20 MG: 20 CAPSULE ORAL at 14:16

## 2022-01-01 RX ADMIN — LINEZOLID 600 MG: 600 INJECTION, SOLUTION INTRAVENOUS at 21:06

## 2022-01-01 RX ADMIN — GEMCITABINE 1463 MG: 2 INJECTION, POWDER, LYOPHILIZED, FOR SOLUTION INTRAVENOUS at 18:08

## 2022-01-01 RX ADMIN — CHOLESTYRAMINE 4 G: 4 POWDER, FOR SUSPENSION ORAL at 18:17

## 2022-01-01 RX ADMIN — DEXTROSE MONOHYDRATE: 50 INJECTION, SOLUTION INTRAVENOUS at 05:23

## 2022-01-01 RX ADMIN — ONDANSETRON 8 MG: 8 TABLET, ORALLY DISINTEGRATING ORAL at 10:54

## 2022-01-01 RX ADMIN — POTASSIUM CHLORIDE 40 MEQ: 1500 TABLET, EXTENDED RELEASE ORAL at 23:54

## 2022-01-01 RX ADMIN — LIDOCAINE HYDROCHLORIDE 2 ML: 10 INJECTION, SOLUTION EPIDURAL; INFILTRATION; INTRACAUDAL; PERINEURAL at 14:48

## 2022-01-01 RX ADMIN — DEXTROSE MONOHYDRATE 1000 ML: 50 INJECTION, SOLUTION INTRAVENOUS at 11:24

## 2022-01-01 RX ADMIN — AMLODIPINE BESYLATE 10 MG: 10 TABLET ORAL at 04:02

## 2022-01-01 RX ADMIN — VANCOMYCIN HYDROCHLORIDE 125 MG: KIT ORAL at 13:17

## 2022-01-01 RX ADMIN — MIDAZOLAM HYDROCHLORIDE 5 MG: 1 INJECTION, SOLUTION INTRAMUSCULAR; INTRAVENOUS at 11:51

## 2022-01-01 RX ADMIN — METOCLOPRAMIDE 5 MG: 5 INJECTION, SOLUTION INTRAMUSCULAR; INTRAVENOUS at 01:58

## 2022-01-01 RX ADMIN — DIPHENOXYLATE HYDROCHLORIDE AND ATROPINE SULFATE 5 ML: 2.5; .025 SOLUTION ORAL at 09:16

## 2022-01-01 RX ADMIN — MORPHINE SULFATE 20 MG: 100 SOLUTION ORAL at 05:00

## 2022-01-01 RX ADMIN — POTASSIUM CHLORIDE 10 MEQ: 7.46 INJECTION, SOLUTION INTRAVENOUS at 14:04

## 2022-01-01 RX ADMIN — ENOXAPARIN SODIUM 40 MG: 40 INJECTION SUBCUTANEOUS at 16:47

## 2022-01-01 RX ADMIN — PREDNISONE 10 MG: 10 TABLET ORAL at 06:12

## 2022-01-01 RX ADMIN — HYDROMORPHONE HYDROCHLORIDE 2 MG: 2 INJECTION INTRAMUSCULAR; INTRAVENOUS; SUBCUTANEOUS at 14:24

## 2022-01-01 RX ADMIN — DIBASIC SODIUM PHOSPHATE, MONOBASIC POTASSIUM PHOSPHATE AND MONOBASIC SODIUM PHOSPHATE 250 MG: 852; 155; 130 TABLET ORAL at 17:05

## 2022-01-01 RX ADMIN — POTASSIUM CHLORIDE 40 MEQ: 1500 TABLET, EXTENDED RELEASE ORAL at 05:32

## 2022-01-01 RX ADMIN — PROPOFOL 30 MG: 10 INJECTION, EMULSION INTRAVENOUS at 13:08

## 2022-01-01 RX ADMIN — OMEPRAZOLE 20 MG: 20 CAPSULE, DELAYED RELEASE ORAL at 20:56

## 2022-01-01 RX ADMIN — DIBASIC SODIUM PHOSPHATE, MONOBASIC POTASSIUM PHOSPHATE AND MONOBASIC SODIUM PHOSPHATE 250 MG: 852; 155; 130 TABLET ORAL at 11:48

## 2022-01-01 RX ADMIN — CARISOPRODOL 350 MG: 350 TABLET ORAL at 06:12

## 2022-01-01 RX ADMIN — HYDROCORTISONE SODIUM SUCCINATE 50 MG: 100 INJECTION, POWDER, FOR SOLUTION INTRAMUSCULAR; INTRAVENOUS at 18:31

## 2022-01-01 RX ADMIN — GEMCITABINE HYDROCHLORIDE 1470 MG: 200 INJECTION, POWDER, LYOPHILIZED, FOR SOLUTION INTRAVENOUS at 17:08

## 2022-01-01 RX ADMIN — METOPROLOL TARTRATE 25 MG: 25 TABLET, FILM COATED ORAL at 17:26

## 2022-01-01 RX ADMIN — MIDAZOLAM HYDROCHLORIDE 1 MG: 1 INJECTION, SOLUTION INTRAMUSCULAR; INTRAVENOUS at 15:00

## 2022-01-01 RX ADMIN — SODIUM CHLORIDE, POTASSIUM CHLORIDE, SODIUM LACTATE AND CALCIUM CHLORIDE 1000 ML: 600; 310; 30; 20 INJECTION, SOLUTION INTRAVENOUS at 16:30

## 2022-01-01 RX ADMIN — METHYLPREDNISOLONE SODIUM SUCCINATE 20 MG: 40 INJECTION, POWDER, FOR SOLUTION INTRAMUSCULAR; INTRAVENOUS at 02:00

## 2022-01-01 RX ADMIN — DIBASIC SODIUM PHOSPHATE, MONOBASIC POTASSIUM PHOSPHATE AND MONOBASIC SODIUM PHOSPHATE 250 MG: 852; 155; 130 TABLET ORAL at 07:36

## 2022-01-01 RX ADMIN — CHOLESTYRAMINE 4 G: 4 POWDER, FOR SUSPENSION ORAL at 22:57

## 2022-01-01 RX ADMIN — HYDROCORTISONE SODIUM SUCCINATE 50 MG: 100 INJECTION, POWDER, FOR SOLUTION INTRAMUSCULAR; INTRAVENOUS at 05:24

## 2022-01-01 RX ADMIN — AMLODIPINE BESYLATE 5 MG: 5 TABLET ORAL at 10:54

## 2022-01-01 RX ADMIN — AMPICILLIN SODIUM AND SULBACTAM SODIUM 3 G: 2; 1 INJECTION, POWDER, FOR SOLUTION INTRAMUSCULAR; INTRAVENOUS at 05:34

## 2022-01-01 RX ADMIN — POTASSIUM CHLORIDE 10 MEQ: 7.46 INJECTION, SOLUTION INTRAVENOUS at 11:00

## 2022-01-01 RX ADMIN — SUCRALFATE 1 G: 1 SUSPENSION ORAL at 11:19

## 2022-01-01 RX ADMIN — DIPHENOXYLATE HYDROCHLORIDE AND ATROPINE SULFATE 5 ML: 2.5; .025 SOLUTION ORAL at 16:47

## 2022-01-01 RX ADMIN — HALOPERIDOL LACTATE 5 MG: 5 INJECTION, SOLUTION INTRAMUSCULAR at 04:06

## 2022-01-01 RX ADMIN — SODIUM BICARBONATE: 84 INJECTION, SOLUTION INTRAVENOUS at 03:04

## 2022-01-01 RX ADMIN — LEVETIRACETAM 500 MG: 500 TABLET, FILM COATED ORAL at 10:41

## 2022-01-01 RX ADMIN — POTASSIUM CHLORIDE 10 MEQ: 7.46 INJECTION, SOLUTION INTRAVENOUS at 09:42

## 2022-01-01 RX ADMIN — ONDANSETRON 4 MG: 2 INJECTION INTRAMUSCULAR; INTRAVENOUS at 16:13

## 2022-01-01 RX ADMIN — DEXTROSE MONOHYDRATE 500 ML: 50 INJECTION, SOLUTION INTRAVENOUS at 16:19

## 2022-01-01 RX ADMIN — MAGNESIUM SULFATE HEPTAHYDRATE 2 G: 40 INJECTION, SOLUTION INTRAVENOUS at 07:23

## 2022-01-01 RX ADMIN — PREDNISONE 20 MG: 20 TABLET ORAL at 17:24

## 2022-01-01 RX ADMIN — METOPROLOL TARTRATE 25 MG: 25 TABLET, FILM COATED ORAL at 17:46

## 2022-01-01 RX ADMIN — OXYCODONE HYDROCHLORIDE 10 MG: 10 TABLET ORAL at 09:20

## 2022-01-01 RX ADMIN — POTASSIUM CHLORIDE 10 MEQ: 7.46 INJECTION, SOLUTION INTRAVENOUS at 10:43

## 2022-01-01 RX ADMIN — OXYCODONE HYDROCHLORIDE 10 MG: 10 TABLET ORAL at 06:13

## 2022-01-01 RX ADMIN — LORAZEPAM 2 MG: 2 INJECTION INTRAMUSCULAR; INTRAVENOUS at 14:26

## 2022-01-01 RX ADMIN — METOPROLOL TARTRATE 25 MG: 25 TABLET, FILM COATED ORAL at 17:24

## 2022-01-01 RX ADMIN — MORPHINE SULFATE 20 MG: 100 SOLUTION ORAL at 05:48

## 2022-01-01 RX ADMIN — SODIUM CHLORIDE, POTASSIUM CHLORIDE, SODIUM LACTATE AND CALCIUM CHLORIDE: 600; 310; 30; 20 INJECTION, SOLUTION INTRAVENOUS at 16:41

## 2022-01-01 RX ADMIN — ONDANSETRON 8 MG: 8 TABLET, ORALLY DISINTEGRATING ORAL at 16:21

## 2022-01-01 RX ADMIN — DEXTROSE MONOHYDRATE: 50 INJECTION, SOLUTION INTRAVENOUS at 08:54

## 2022-01-01 RX ADMIN — OXYCODONE 5 MG: 5 TABLET ORAL at 00:50

## 2022-01-01 RX ADMIN — ONDANSETRON 4 MG: 2 INJECTION INTRAMUSCULAR; INTRAVENOUS at 21:17

## 2022-01-01 RX ADMIN — CARISOPRODOL 350 MG: 350 TABLET ORAL at 17:26

## 2022-01-01 RX ADMIN — POTASSIUM CHLORIDE 40 MEQ: 1500 TABLET, EXTENDED RELEASE ORAL at 06:53

## 2022-01-01 RX ADMIN — AMPICILLIN AND SULBACTAM 3 G: 1; 2 INJECTION, POWDER, FOR SOLUTION INTRAMUSCULAR; INTRAVENOUS at 23:18

## 2022-01-01 RX ADMIN — HEPARIN SODIUM 5000 UNITS: 5000 INJECTION, SOLUTION INTRAVENOUS; SUBCUTANEOUS at 22:46

## 2022-01-01 RX ADMIN — ONDANSETRON 4 MG: 4 TABLET, ORALLY DISINTEGRATING ORAL at 08:11

## 2022-01-01 RX ADMIN — PREDNISONE 20 MG: 20 TABLET ORAL at 08:58

## 2022-01-01 RX ADMIN — LEVETIRACETAM 500 MG: 500 TABLET, FILM COATED ORAL at 04:49

## 2022-01-01 RX ADMIN — LORAZEPAM 1 MG: 2 SOLUTION, CONCENTRATE ORAL at 10:42

## 2022-01-01 RX ADMIN — HYDROCORTISONE SODIUM SUCCINATE 50 MG: 100 INJECTION, POWDER, FOR SOLUTION INTRAMUSCULAR; INTRAVENOUS at 00:22

## 2022-01-01 RX ADMIN — METOPROLOL TARTRATE 5 MG: 1 INJECTION, SOLUTION INTRAVENOUS at 05:40

## 2022-01-01 RX ADMIN — POTASSIUM CHLORIDE 40 MEQ: 1500 TABLET, EXTENDED RELEASE ORAL at 04:45

## 2022-01-01 RX ADMIN — SUCRALFATE 1 G: 1 SUSPENSION ORAL at 13:14

## 2022-01-01 RX ADMIN — AMPICILLIN AND SULBACTAM 3 G: 1; 2 INJECTION, POWDER, FOR SOLUTION INTRAMUSCULAR; INTRAVENOUS at 00:08

## 2022-01-01 RX ADMIN — LOPERAMIDE HYDROCHLORIDE 2 MG: 2 CAPSULE ORAL at 10:41

## 2022-01-01 RX ADMIN — DEXTROSE MONOHYDRATE: 50 INJECTION, SOLUTION INTRAVENOUS at 08:04

## 2022-01-01 RX ADMIN — LEVETIRACETAM 500 MG: 500 TABLET, FILM COATED ORAL at 09:17

## 2022-01-01 RX ADMIN — MORPHINE SULFATE 4 MG: 4 INJECTION INTRAVENOUS at 09:45

## 2022-01-01 RX ADMIN — PIPERACILLIN AND TAZOBACTAM 4.5 G: 4; .5 INJECTION, POWDER, LYOPHILIZED, FOR SOLUTION INTRAVENOUS; PARENTERAL at 18:39

## 2022-01-01 RX ADMIN — HALOPERIDOL LACTATE 5 MG: 5 INJECTION, SOLUTION INTRAMUSCULAR at 00:20

## 2022-01-01 RX ADMIN — HYDROMORPHONE HYDROCHLORIDE 1 MG: 1 INJECTION, SOLUTION INTRAMUSCULAR; INTRAVENOUS; SUBCUTANEOUS at 14:08

## 2022-01-01 RX ADMIN — PROPOFOL 30 MG: 10 INJECTION, EMULSION INTRAVENOUS at 13:22

## 2022-01-01 RX ADMIN — CARISOPRODOL 350 MG: 350 TABLET ORAL at 23:31

## 2022-01-01 RX ADMIN — HYDROMORPHONE HYDROCHLORIDE 1 MG: 1 INJECTION, SOLUTION INTRAMUSCULAR; INTRAVENOUS; SUBCUTANEOUS at 02:15

## 2022-01-01 RX ADMIN — METOPROLOL TARTRATE 5 MG: 1 INJECTION, SOLUTION INTRAVENOUS at 14:09

## 2022-01-01 RX ADMIN — LORAZEPAM 2 MG: 2 INJECTION INTRAMUSCULAR; INTRAVENOUS at 02:14

## 2022-01-01 RX ADMIN — LORAZEPAM 1 MG: 2 SOLUTION, CONCENTRATE ORAL at 04:14

## 2022-01-01 RX ADMIN — SODIUM CHLORIDE 150 MG: 9 INJECTION, SOLUTION INTRAVENOUS at 13:36

## 2022-01-01 RX ADMIN — ONDANSETRON 4 MG: 4 TABLET, ORALLY DISINTEGRATING ORAL at 13:18

## 2022-01-01 RX ADMIN — IOHEXOL 100 ML: 350 INJECTION, SOLUTION INTRAVENOUS at 10:20

## 2022-01-01 RX ADMIN — Medication 100 MG: at 17:02

## 2022-01-01 RX ADMIN — LOPERAMIDE HYDROCHLORIDE 4 MG: 2 CAPSULE ORAL at 17:03

## 2022-01-01 RX ADMIN — LORAZEPAM 2 MG: 2 INJECTION INTRAMUSCULAR; INTRAVENOUS at 05:41

## 2022-01-01 RX ADMIN — LOPERAMIDE HYDROCHLORIDE 4 MG: 2 CAPSULE ORAL at 12:00

## 2022-01-01 RX ADMIN — SODIUM BICARBONATE 650 MG: 650 TABLET ORAL at 17:36

## 2022-01-01 RX ADMIN — LEVETIRACETAM 500 MG: 500 TABLET, FILM COATED ORAL at 08:31

## 2022-01-01 RX ADMIN — METHYLPREDNISOLONE SODIUM SUCCINATE 125 MG: 125 INJECTION, POWDER, FOR SOLUTION INTRAMUSCULAR; INTRAVENOUS at 16:51

## 2022-01-01 RX ADMIN — OMEPRAZOLE 20 MG: 20 CAPSULE, DELAYED RELEASE ORAL at 18:02

## 2022-01-01 RX ADMIN — POTASSIUM CHLORIDE 40 MEQ: 29.8 INJECTION, SOLUTION INTRAVENOUS at 02:07

## 2022-01-01 RX ADMIN — FLUOXETINE 20 MG: 20 CAPSULE ORAL at 05:45

## 2022-01-01 RX ADMIN — POTASSIUM CHLORIDE 40 MEQ: 1500 TABLET, EXTENDED RELEASE ORAL at 22:59

## 2022-01-01 RX ADMIN — SUCRALFATE 1 G: 1 SUSPENSION ORAL at 18:31

## 2022-01-01 RX ADMIN — FENTANYL CITRATE 100 MCG: 50 INJECTION, SOLUTION INTRAMUSCULAR; INTRAVENOUS at 11:15

## 2022-01-01 RX ADMIN — POTASSIUM CHLORIDE 10 MEQ: 7.46 INJECTION, SOLUTION INTRAVENOUS at 10:59

## 2022-01-01 RX ADMIN — OXYCODONE HYDROCHLORIDE 10 MG: 10 TABLET ORAL at 22:45

## 2022-01-01 RX ADMIN — POTASSIUM CHLORIDE 40 MEQ: 1500 TABLET, EXTENDED RELEASE ORAL at 09:37

## 2022-01-01 RX ADMIN — POTASSIUM CHLORIDE 40 MEQ: 1500 TABLET, EXTENDED RELEASE ORAL at 04:49

## 2022-01-01 RX ADMIN — LORAZEPAM 2 MG: 2 INJECTION INTRAMUSCULAR; INTRAVENOUS at 05:50

## 2022-01-01 RX ADMIN — LEVETIRACETAM 500 MG: 500 TABLET, FILM COATED ORAL at 18:35

## 2022-01-01 RX ADMIN — MAGNESIUM SULFATE HEPTAHYDRATE 4 G: 40 INJECTION, SOLUTION INTRAVENOUS at 01:34

## 2022-01-01 RX ADMIN — HEPARIN 500 UNITS: 100 SYRINGE at 00:53

## 2022-01-01 RX ADMIN — PREDNISONE 40 MG: 20 TABLET ORAL at 17:17

## 2022-01-01 RX ADMIN — HYDRALAZINE HYDROCHLORIDE 20 MG: 20 INJECTION INTRAMUSCULAR; INTRAVENOUS at 08:12

## 2022-01-01 RX ADMIN — POTASSIUM CHLORIDE 10 MEQ: 7.46 INJECTION, SOLUTION INTRAVENOUS at 06:40

## 2022-01-01 RX ADMIN — POTASSIUM CHLORIDE 40 MEQ: 1500 TABLET, EXTENDED RELEASE ORAL at 09:25

## 2022-01-01 RX ADMIN — SODIUM CHLORIDE 400 MG: 9 INJECTION, SOLUTION INTRAVENOUS at 13:00

## 2022-01-01 RX ADMIN — CHOLESTYRAMINE 4 G: 4 POWDER, FOR SUSPENSION ORAL at 17:14

## 2022-01-01 RX ADMIN — MORPHINE SULFATE 20 MG: 100 SOLUTION ORAL at 08:52

## 2022-01-01 RX ADMIN — HEPARIN 500 UNITS: 100 SYRINGE at 14:39

## 2022-01-01 RX ADMIN — OXYCODONE HYDROCHLORIDE 10 MG: 10 TABLET ORAL at 20:31

## 2022-01-01 RX ADMIN — FENTANYL CITRATE 25 MCG: 50 INJECTION, SOLUTION INTRAMUSCULAR; INTRAVENOUS at 12:50

## 2022-01-01 RX ADMIN — FENTANYL CITRATE 20 MCG: 50 INJECTION, SOLUTION INTRAMUSCULAR; INTRAVENOUS at 13:00

## 2022-01-01 RX ADMIN — LORAZEPAM 1 MG: 2 SOLUTION, CONCENTRATE ORAL at 06:18

## 2022-01-01 RX ADMIN — AMLODIPINE BESYLATE 10 MG: 10 TABLET ORAL at 10:41

## 2022-01-01 RX ADMIN — POTASSIUM CHLORIDE 40 MEQ: 1500 TABLET, EXTENDED RELEASE ORAL at 13:32

## 2022-01-01 RX ADMIN — LORAZEPAM 2 MG: 2 INJECTION INTRAMUSCULAR; INTRAVENOUS at 22:01

## 2022-01-01 RX ADMIN — DEXTROSE MONOHYDRATE: 50 INJECTION, SOLUTION INTRAVENOUS at 12:11

## 2022-01-01 RX ADMIN — POTASSIUM CHLORIDE 10 MEQ: 7.46 INJECTION, SOLUTION INTRAVENOUS at 04:04

## 2022-01-01 RX ADMIN — POTASSIUM CHLORIDE 10 MEQ: 7.46 INJECTION, SOLUTION INTRAVENOUS at 05:20

## 2022-01-01 RX ADMIN — Medication 100 MG: at 17:14

## 2022-01-01 RX ADMIN — SODIUM BICARBONATE 650 MG: 650 TABLET ORAL at 17:26

## 2022-01-01 RX ADMIN — POTASSIUM CHLORIDE 10 MEQ: 7.46 INJECTION, SOLUTION INTRAVENOUS at 09:37

## 2022-01-01 RX ADMIN — AMPICILLIN AND SULBACTAM 3 G: 1; 2 INJECTION, POWDER, FOR SOLUTION INTRAMUSCULAR; INTRAVENOUS at 05:19

## 2022-01-01 RX ADMIN — PROPOFOL 30 MG: 10 INJECTION, EMULSION INTRAVENOUS at 13:18

## 2022-01-01 RX ADMIN — INSULIN HUMAN 1 UNITS: 100 INJECTION, SOLUTION PARENTERAL at 05:32

## 2022-01-01 RX ADMIN — METOPROLOL TARTRATE 25 MG: 25 TABLET, FILM COATED ORAL at 00:55

## 2022-01-01 RX ADMIN — PROPOFOL 20 MG: 10 INJECTION, EMULSION INTRAVENOUS at 10:36

## 2022-01-01 RX ADMIN — LORAZEPAM 2 MG: 2 INJECTION INTRAMUSCULAR; INTRAVENOUS at 17:33

## 2022-01-01 RX ADMIN — POTASSIUM PHOSPHATE, MONOBASIC AND POTASSIUM PHOSPHATE, DIBASIC 30 MMOL: 224; 236 INJECTION, SOLUTION, CONCENTRATE INTRAVENOUS at 09:19

## 2022-01-01 RX ADMIN — SODIUM BICARBONATE 650 MG: 650 TABLET ORAL at 12:18

## 2022-01-01 RX ADMIN — SUCRALFATE 1 G: 1 SUSPENSION ORAL at 00:31

## 2022-01-01 RX ADMIN — LORAZEPAM 2 MG: 2 INJECTION INTRAMUSCULAR; INTRAVENOUS at 09:47

## 2022-01-01 RX ADMIN — HEPARIN 500 UNITS: 100 SYRINGE at 04:10

## 2022-01-01 RX ADMIN — ONDANSETRON 4 MG: 4 TABLET, ORALLY DISINTEGRATING ORAL at 18:37

## 2022-01-01 RX ADMIN — ONDANSETRON 4 MG: 2 INJECTION INTRAMUSCULAR; INTRAVENOUS at 12:55

## 2022-01-01 RX ADMIN — CARISOPRODOL 350 MG: 350 TABLET ORAL at 21:28

## 2022-01-01 RX ADMIN — LORAZEPAM 2 MG: 2 INJECTION INTRAMUSCULAR; INTRAVENOUS at 21:33

## 2022-01-01 RX ADMIN — NYSTATIN: 100000 POWDER TOPICAL at 18:14

## 2022-01-01 RX ADMIN — DIPHENOXYLATE HYDROCHLORIDE AND ATROPINE SULFATE 5 ML: 2.5; .025 SOLUTION ORAL at 23:54

## 2022-01-01 RX ADMIN — ONDANSETRON 4 MG: 4 TABLET, ORALLY DISINTEGRATING ORAL at 14:16

## 2022-01-01 RX ADMIN — ONDANSETRON 4 MG: 2 INJECTION INTRAMUSCULAR; INTRAVENOUS at 22:15

## 2022-01-01 RX ADMIN — HYDROMORPHONE HYDROCHLORIDE 1 MG: 1 INJECTION, SOLUTION INTRAMUSCULAR; INTRAVENOUS; SUBCUTANEOUS at 05:49

## 2022-01-01 RX ADMIN — METOPROLOL TARTRATE 25 MG: 25 TABLET, FILM COATED ORAL at 05:08

## 2022-01-01 RX ADMIN — OXYCODONE HYDROCHLORIDE 10 MG: 10 TABLET ORAL at 21:28

## 2022-01-01 RX ADMIN — METHYLPREDNISOLONE SODIUM SUCCINATE 20 MG: 40 INJECTION, POWDER, FOR SOLUTION INTRAMUSCULAR; INTRAVENOUS at 11:27

## 2022-01-01 RX ADMIN — METOPROLOL TARTRATE 25 MG: 25 TABLET, FILM COATED ORAL at 04:24

## 2022-01-01 RX ADMIN — POTASSIUM CHLORIDE 10 MEQ: 7.46 INJECTION, SOLUTION INTRAVENOUS at 09:56

## 2022-01-01 RX ADMIN — FENTANYL CITRATE 25 MCG: 50 INJECTION INTRAMUSCULAR; INTRAVENOUS at 16:59

## 2022-01-01 RX ADMIN — DIBASIC SODIUM PHOSPHATE, MONOBASIC POTASSIUM PHOSPHATE AND MONOBASIC SODIUM PHOSPHATE 250 MG: 852; 155; 130 TABLET ORAL at 12:11

## 2022-01-01 RX ADMIN — SUCRALFATE 1 G: 1 SUSPENSION ORAL at 05:45

## 2022-01-01 RX ADMIN — LABETALOL HYDROCHLORIDE 10 MG: 5 INJECTION, SOLUTION INTRAVENOUS at 17:37

## 2022-01-01 RX ADMIN — AMPICILLIN AND SULBACTAM 3 G: 1; 2 INJECTION, POWDER, FOR SOLUTION INTRAMUSCULAR; INTRAVENOUS at 18:20

## 2022-01-01 RX ADMIN — VANCOMYCIN HYDROCHLORIDE 125 MG: KIT ORAL at 06:13

## 2022-01-01 RX ADMIN — OMEPRAZOLE 40 MG: KIT at 04:02

## 2022-01-01 RX ADMIN — METOCLOPRAMIDE 5 MG: 5 INJECTION, SOLUTION INTRAMUSCULAR; INTRAVENOUS at 12:37

## 2022-01-01 RX ADMIN — METOPROLOL TARTRATE 25 MG: 25 TABLET, FILM COATED ORAL at 10:41

## 2022-01-01 RX ADMIN — IOHEXOL 80 ML: 350 INJECTION, SOLUTION INTRAVENOUS at 16:15

## 2022-01-01 RX ADMIN — ONDANSETRON 4 MG: 2 INJECTION INTRAMUSCULAR; INTRAVENOUS at 00:51

## 2022-01-01 RX ADMIN — SODIUM CHLORIDE 400 MG: 9 INJECTION, SOLUTION INTRAVENOUS at 11:33

## 2022-01-01 RX ADMIN — SODIUM CHLORIDE: 9 INJECTION, SOLUTION INTRAVENOUS at 07:25

## 2022-01-01 RX ADMIN — LEVETIRACETAM 500 MG: 500 TABLET, FILM COATED ORAL at 06:11

## 2022-01-01 RX ADMIN — LOPERAMIDE HYDROCHLORIDE 2 MG: 2 CAPSULE ORAL at 05:24

## 2022-01-01 RX ADMIN — POTASSIUM CHLORIDE 40 MEQ: 1500 TABLET, EXTENDED RELEASE ORAL at 04:53

## 2022-01-01 RX ADMIN — SODIUM BICARBONATE 650 MG: 650 TABLET ORAL at 05:32

## 2022-01-01 RX ADMIN — DEXMEDETOMIDINE 0.2 MCG/KG/HR: 200 INJECTION, SOLUTION INTRAVENOUS at 12:52

## 2022-01-01 RX ADMIN — OMEPRAZOLE 40 MG: 20 CAPSULE, DELAYED RELEASE ORAL at 05:04

## 2022-01-01 RX ADMIN — HYDROCORTISONE SODIUM SUCCINATE 50 MG: 100 INJECTION, POWDER, FOR SOLUTION INTRAMUSCULAR; INTRAVENOUS at 12:02

## 2022-01-01 RX ADMIN — HYDROCORTISONE SODIUM SUCCINATE 50 MG: 100 INJECTION, POWDER, FOR SOLUTION INTRAMUSCULAR; INTRAVENOUS at 01:08

## 2022-01-01 RX ADMIN — SUCRALFATE 1 G: 1 SUSPENSION ORAL at 17:49

## 2022-01-01 RX ADMIN — LORAZEPAM 1 MG: 2 SOLUTION, CONCENTRATE ORAL at 13:51

## 2022-01-01 RX ADMIN — FLUOXETINE 20 MG: 20 CAPSULE ORAL at 04:49

## 2022-01-01 RX ADMIN — FENTANYL CITRATE 100 MCG: 50 INJECTION, SOLUTION INTRAMUSCULAR; INTRAVENOUS at 01:33

## 2022-01-01 RX ADMIN — POTASSIUM CHLORIDE 10 MEQ: 7.46 INJECTION, SOLUTION INTRAVENOUS at 06:12

## 2022-01-01 RX ADMIN — POTASSIUM CHLORIDE 40 MEQ: 1500 TABLET, EXTENDED RELEASE ORAL at 10:11

## 2022-01-01 RX ADMIN — POTASSIUM CHLORIDE 10 MEQ: 7.46 INJECTION, SOLUTION INTRAVENOUS at 12:05

## 2022-01-01 RX ADMIN — MORPHINE SULFATE 20 MG: 100 SOLUTION ORAL at 02:22

## 2022-01-01 RX ADMIN — HYDRALAZINE HYDROCHLORIDE 10 MG: 20 INJECTION INTRAMUSCULAR; INTRAVENOUS at 19:46

## 2022-01-01 RX ADMIN — METOPROLOL TARTRATE 5 MG: 1 INJECTION, SOLUTION INTRAVENOUS at 18:37

## 2022-01-01 RX ADMIN — MORPHINE SULFATE 20 MG: 100 SOLUTION ORAL at 20:53

## 2022-01-01 RX ADMIN — ENOXAPARIN SODIUM 40 MG: 40 INJECTION SUBCUTANEOUS at 17:19

## 2022-01-01 RX ADMIN — CEFTRIAXONE SODIUM 1 G: 1 INJECTION, POWDER, FOR SOLUTION INTRAMUSCULAR; INTRAVENOUS at 11:19

## 2022-01-01 RX ADMIN — POTASSIUM CHLORIDE 40 MEQ: 1500 TABLET, EXTENDED RELEASE ORAL at 08:52

## 2022-01-01 RX ADMIN — HEPARIN SODIUM 5000 UNITS: 5000 INJECTION, SOLUTION INTRAVENOUS; SUBCUTANEOUS at 06:13

## 2022-01-01 RX ADMIN — OMEPRAZOLE 40 MG: KIT at 17:03

## 2022-01-01 RX ADMIN — PROMETHAZINE HYDROCHLORIDE 25 MG: 25 TABLET ORAL at 14:17

## 2022-01-01 RX ADMIN — FENTANYL CITRATE 100 MCG: 50 INJECTION, SOLUTION INTRAMUSCULAR; INTRAVENOUS at 12:10

## 2022-01-01 RX ADMIN — HYDROCORTISONE SODIUM SUCCINATE 50 MG: 100 INJECTION, POWDER, FOR SOLUTION INTRAMUSCULAR; INTRAVENOUS at 13:31

## 2022-01-01 RX ADMIN — LORAZEPAM 1 MG: 2 SOLUTION, CONCENTRATE ORAL at 17:50

## 2022-01-01 RX ADMIN — CHOLESTYRAMINE 4 G: 4 POWDER, FOR SUSPENSION ORAL at 16:51

## 2022-01-01 RX ADMIN — DEXTROSE MONOHYDRATE: 50 INJECTION, SOLUTION INTRAVENOUS at 15:36

## 2022-01-01 RX ADMIN — METOPROLOL TARTRATE 25 MG: 25 TABLET, FILM COATED ORAL at 17:17

## 2022-01-01 RX ADMIN — POTASSIUM CHLORIDE 40 MEQ: 1500 TABLET, EXTENDED RELEASE ORAL at 10:14

## 2022-01-01 RX ADMIN — SUCRALFATE 1 G: 1 SUSPENSION ORAL at 04:48

## 2022-01-01 RX ADMIN — POTASSIUM CHLORIDE 10 MEQ: 7.46 INJECTION, SOLUTION INTRAVENOUS at 15:36

## 2022-01-01 RX ADMIN — METOPROLOL TARTRATE 25 MG: 25 TABLET, FILM COATED ORAL at 16:51

## 2022-01-01 RX ADMIN — METOPROLOL TARTRATE 25 MG: 25 TABLET, FILM COATED ORAL at 18:35

## 2022-01-01 RX ADMIN — OMEPRAZOLE 20 MG: 20 CAPSULE, DELAYED RELEASE ORAL at 05:45

## 2022-01-01 RX ADMIN — LORAZEPAM 2 MG: 2 INJECTION INTRAMUSCULAR; INTRAVENOUS at 17:21

## 2022-01-01 RX ADMIN — HEPARIN 500 UNITS: 100 SYRINGE at 13:40

## 2022-01-01 RX ADMIN — CARBOPLATIN 266 MG: 10 INJECTION, SOLUTION INTRAVENOUS at 18:47

## 2022-01-01 RX ADMIN — HEPARIN 500 UNITS: 100 SYRINGE at 17:51

## 2022-01-01 RX ADMIN — MIDAZOLAM 1 MG: 1 INJECTION INTRAMUSCULAR; INTRAVENOUS at 09:03

## 2022-01-01 RX ADMIN — CARISOPRODOL 350 MG: 350 TABLET ORAL at 12:15

## 2022-01-01 RX ADMIN — LORAZEPAM 2 MG: 2 INJECTION INTRAMUSCULAR; INTRAVENOUS at 10:10

## 2022-01-01 RX ADMIN — MORPHINE SULFATE 20 MG: 100 SOLUTION ORAL at 10:42

## 2022-01-01 RX ADMIN — SODIUM CHLORIDE, POTASSIUM CHLORIDE, SODIUM LACTATE AND CALCIUM CHLORIDE: 600; 310; 30; 20 INJECTION, SOLUTION INTRAVENOUS at 21:35

## 2022-01-01 RX ADMIN — LABETALOL HYDROCHLORIDE 10 MG: 5 INJECTION, SOLUTION INTRAVENOUS at 11:22

## 2022-01-01 RX ADMIN — MIDAZOLAM 2 MG: 1 INJECTION INTRAMUSCULAR; INTRAVENOUS at 09:32

## 2022-01-01 RX ADMIN — LEVETIRACETAM 500 MG: 500 TABLET, FILM COATED ORAL at 17:03

## 2022-01-01 RX ADMIN — FENTANYL CITRATE 25 MCG: 50 INJECTION, SOLUTION INTRAMUSCULAR; INTRAVENOUS at 12:46

## 2022-01-01 RX ADMIN — MORPHINE SULFATE 20 MG: 100 SOLUTION ORAL at 20:44

## 2022-01-01 RX ADMIN — PROPOFOL 25 MG: 10 INJECTION, EMULSION INTRAVENOUS at 13:26

## 2022-01-01 RX ADMIN — ALTEPLASE 2 MG: 2.2 INJECTION, POWDER, LYOPHILIZED, FOR SOLUTION INTRAVENOUS at 02:07

## 2022-01-01 RX ADMIN — LORAZEPAM 2 MG: 2 INJECTION INTRAMUSCULAR; INTRAVENOUS at 15:08

## 2022-01-01 RX ADMIN — HYDROCORTISONE SODIUM SUCCINATE 50 MG: 100 INJECTION, POWDER, FOR SOLUTION INTRAMUSCULAR; INTRAVENOUS at 17:26

## 2022-01-01 RX ADMIN — HEPARIN 5 ML: 100 SYRINGE at 17:44

## 2022-01-01 RX ADMIN — METOPROLOL TARTRATE 25 MG: 25 TABLET, FILM COATED ORAL at 17:05

## 2022-01-01 RX ADMIN — SUCRALFATE 1 G: 1 SUSPENSION ORAL at 18:02

## 2022-01-01 RX ADMIN — SODIUM BICARBONATE 650 MG: 650 TABLET ORAL at 11:06

## 2022-01-01 RX ADMIN — LIDOCAINE HYDROCHLORIDE 2 ML: 10 INJECTION, SOLUTION EPIDURAL; INFILTRATION; INTRACAUDAL; PERINEURAL at 11:50

## 2022-01-01 RX ADMIN — HYDROMORPHONE HYDROCHLORIDE 1 MG: 1 INJECTION, SOLUTION INTRAMUSCULAR; INTRAVENOUS; SUBCUTANEOUS at 09:49

## 2022-01-01 RX ADMIN — PROPOFOL 40 MG: 10 INJECTION, EMULSION INTRAVENOUS at 10:31

## 2022-01-01 RX ADMIN — ONDANSETRON 8 MG: 8 TABLET, ORALLY DISINTEGRATING ORAL at 09:39

## 2022-01-01 RX ADMIN — MORPHINE SULFATE 20 MG: 100 SOLUTION ORAL at 14:52

## 2022-01-01 RX ADMIN — POTASSIUM PHOSPHATE, MONOBASIC AND POTASSIUM PHOSPHATE, DIBASIC 30 MMOL: 224; 236 INJECTION, SOLUTION, CONCENTRATE INTRAVENOUS at 10:18

## 2022-01-01 RX ADMIN — ACETAMINOPHEN 325 MG: 325 TABLET, FILM COATED ORAL at 03:55

## 2022-01-01 RX ADMIN — POTASSIUM CHLORIDE 40 MEQ: 1500 TABLET, EXTENDED RELEASE ORAL at 10:41

## 2022-01-01 RX ADMIN — FENTANYL CITRATE 50 MCG: 50 INJECTION, SOLUTION INTRAMUSCULAR; INTRAVENOUS at 09:03

## 2022-01-01 RX ADMIN — DIBASIC SODIUM PHOSPHATE, MONOBASIC POTASSIUM PHOSPHATE AND MONOBASIC SODIUM PHOSPHATE 250 MG: 852; 155; 130 TABLET ORAL at 17:03

## 2022-01-01 RX ADMIN — FENTANYL CITRATE 20 MCG: 50 INJECTION, SOLUTION INTRAMUSCULAR; INTRAVENOUS at 13:10

## 2022-01-01 RX ADMIN — LEVETIRACETAM 500 MG: 500 TABLET, FILM COATED ORAL at 22:57

## 2022-01-01 RX ADMIN — OXYCODONE HYDROCHLORIDE 10 MG: 10 TABLET ORAL at 23:13

## 2022-01-01 RX ADMIN — SODIUM CHLORIDE, POTASSIUM CHLORIDE, SODIUM LACTATE AND CALCIUM CHLORIDE 1000 ML: 600; 310; 30; 20 INJECTION, SOLUTION INTRAVENOUS at 13:29

## 2022-01-01 RX ADMIN — SUCRALFATE 1 G: 1 SUSPENSION ORAL at 11:29

## 2022-01-01 RX ADMIN — OMEPRAZOLE 20 MG: 20 CAPSULE, DELAYED RELEASE ORAL at 06:11

## 2022-01-01 RX ADMIN — HEPARIN 500 UNITS: 100 SYRINGE at 15:52

## 2022-01-01 RX ADMIN — HYDROMORPHONE HYDROCHLORIDE 1 MG: 1 INJECTION, SOLUTION INTRAMUSCULAR; INTRAVENOUS; SUBCUTANEOUS at 14:27

## 2022-01-01 RX ADMIN — SODIUM CHLORIDE 150 MG: 9 INJECTION, SOLUTION INTRAVENOUS at 16:00

## 2022-01-01 RX ADMIN — SODIUM CHLORIDE, POTASSIUM CHLORIDE, SODIUM LACTATE AND CALCIUM CHLORIDE 2000 ML: 600; 310; 30; 20 INJECTION, SOLUTION INTRAVENOUS at 16:00

## 2022-01-01 RX ADMIN — HYDROMORPHONE HYDROCHLORIDE 2 MG: 2 INJECTION INTRAMUSCULAR; INTRAVENOUS; SUBCUTANEOUS at 05:39

## 2022-01-01 RX ADMIN — AMPICILLIN AND SULBACTAM 3 G: 1; 2 INJECTION, POWDER, FOR SOLUTION INTRAMUSCULAR; INTRAVENOUS at 00:32

## 2022-01-01 RX ADMIN — FENTANYL CITRATE 100 MCG: 50 INJECTION, SOLUTION INTRAMUSCULAR; INTRAVENOUS at 10:31

## 2022-01-01 RX ADMIN — ONDANSETRON 8 MG: 2 INJECTION INTRAMUSCULAR; INTRAVENOUS at 05:05

## 2022-01-01 RX ADMIN — LEVETIRACETAM 500 MG: 500 TABLET, FILM COATED ORAL at 10:14

## 2022-01-01 RX ADMIN — Medication 100 MG: at 18:06

## 2022-01-01 RX ADMIN — LABETALOL HYDROCHLORIDE 10 MG: 5 INJECTION, SOLUTION INTRAVENOUS at 23:14

## 2022-01-01 RX ADMIN — FUROSEMIDE 20 MG: 10 INJECTION, SOLUTION INTRAMUSCULAR; INTRAVENOUS at 14:21

## 2022-01-01 RX ADMIN — PREDNISONE 40 MG: 20 TABLET ORAL at 08:30

## 2022-01-01 RX ADMIN — LEVETIRACETAM 500 MG: 500 TABLET, FILM COATED ORAL at 20:08

## 2022-01-01 RX ADMIN — LEVETIRACETAM 500 MG: 500 TABLET, FILM COATED ORAL at 21:16

## 2022-01-01 RX ADMIN — MORPHINE SULFATE 20 MG: 100 SOLUTION ORAL at 00:58

## 2022-01-01 RX ADMIN — DIPHENOXYLATE HYDROCHLORIDE AND ATROPINE SULFATE 5 ML: 2.5; .025 SOLUTION ORAL at 17:37

## 2022-01-01 RX ADMIN — LEVETIRACETAM 500 MG: 500 TABLET, FILM COATED ORAL at 20:56

## 2022-01-01 RX ADMIN — AMPICILLIN AND SULBACTAM 3 G: 1; 2 INJECTION, POWDER, FOR SOLUTION INTRAMUSCULAR; INTRAVENOUS at 17:54

## 2022-01-01 RX ADMIN — AMPICILLIN AND SULBACTAM 3 G: 1; 2 INJECTION, POWDER, FOR SOLUTION INTRAMUSCULAR; INTRAVENOUS at 05:30

## 2022-01-01 RX ADMIN — LIDOCAINE HYDROCHLORIDE 80 MG: 20 INJECTION, SOLUTION EPIDURAL; INFILTRATION; INTRACAUDAL at 09:52

## 2022-01-01 RX ADMIN — ONDANSETRON 8 MG: 2 INJECTION INTRAMUSCULAR; INTRAVENOUS at 15:17

## 2022-01-01 RX ADMIN — SODIUM BICARBONATE 650 MG: 650 TABLET ORAL at 12:31

## 2022-01-01 RX ADMIN — PIPERACILLIN AND TAZOBACTAM 4.5 G: 4; .5 INJECTION, POWDER, LYOPHILIZED, FOR SOLUTION INTRAVENOUS; PARENTERAL at 05:28

## 2022-01-01 RX ADMIN — MORPHINE SULFATE 20 MG: 100 SOLUTION ORAL at 17:47

## 2022-01-01 RX ADMIN — LOPERAMIDE HYDROCHLORIDE 4 MG: 2 CAPSULE ORAL at 00:55

## 2022-01-01 RX ADMIN — DEXTROSE MONOHYDRATE: 50 INJECTION, SOLUTION INTRAVENOUS at 10:37

## 2022-01-01 RX ADMIN — MAGNESIUM SULFATE 2 G: 2 INJECTION INTRAVENOUS at 14:42

## 2022-01-01 RX ADMIN — Medication 100 MCG/HR: at 05:59

## 2022-01-01 RX ADMIN — SODIUM BICARBONATE 650 MG: 650 TABLET ORAL at 04:25

## 2022-01-01 RX ADMIN — HYDROCORTISONE SODIUM SUCCINATE 50 MG: 100 INJECTION, POWDER, FOR SOLUTION INTRAMUSCULAR; INTRAVENOUS at 18:12

## 2022-01-01 RX ADMIN — HYDRALAZINE HYDROCHLORIDE 20 MG: 20 INJECTION INTRAMUSCULAR; INTRAVENOUS at 19:27

## 2022-01-01 RX ADMIN — ENOXAPARIN SODIUM 40 MG: 40 INJECTION SUBCUTANEOUS at 17:06

## 2022-01-01 RX ADMIN — FAMOTIDINE 20 MG: 10 INJECTION, SOLUTION INTRAVENOUS at 05:21

## 2022-01-01 RX ADMIN — MORPHINE SULFATE 20 MG: 100 SOLUTION ORAL at 04:14

## 2022-01-01 RX ADMIN — NALOXONE HYDROCHLORIDE 2 MG: 1 INJECTION PARENTERAL at 15:21

## 2022-01-01 RX ADMIN — MAGNESIUM SULFATE HEPTAHYDRATE 2 G: 40 INJECTION, SOLUTION INTRAVENOUS at 07:35

## 2022-01-01 RX ADMIN — LOPERAMIDE HYDROCHLORIDE 4 MG: 2 CAPSULE ORAL at 07:36

## 2022-01-01 RX ADMIN — DIBASIC SODIUM PHOSPHATE, MONOBASIC POTASSIUM PHOSPHATE AND MONOBASIC SODIUM PHOSPHATE 250 MG: 852; 155; 130 TABLET ORAL at 23:14

## 2022-01-01 RX ADMIN — POTASSIUM CHLORIDE 20 MEQ: 14.9 INJECTION, SOLUTION INTRAVENOUS at 22:09

## 2022-01-01 RX ADMIN — LIDOCAINE HYDROCHLORIDE 2 ML: 10 INJECTION, SOLUTION EPIDURAL; INFILTRATION; INTRACAUDAL; PERINEURAL at 14:04

## 2022-01-01 RX ADMIN — POTASSIUM PHOSPHATE, MONOBASIC AND POTASSIUM PHOSPHATE, DIBASIC 15 MMOL: 224; 236 INJECTION, SOLUTION, CONCENTRATE INTRAVENOUS at 13:32

## 2022-01-01 RX ADMIN — CARBOPLATIN 276 MG: 450 INJECTION, SOLUTION INTRAVENOUS at 16:11

## 2022-01-01 RX ADMIN — HEPARIN SODIUM 5000 UNITS: 5000 INJECTION, SOLUTION INTRAVENOUS; SUBCUTANEOUS at 06:22

## 2022-01-01 RX ADMIN — MORPHINE SULFATE 20 MG: 100 SOLUTION ORAL at 05:46

## 2022-01-01 RX ADMIN — GADOTERIDOL 18 ML: 279.3 INJECTION, SOLUTION INTRAVENOUS at 08:35

## 2022-01-01 RX ADMIN — CARBOPLATIN 280 MG: 10 INJECTION, SOLUTION INTRAVENOUS at 17:20

## 2022-01-01 RX ADMIN — CARBOPLATIN 282 MG: 10 INJECTION, SOLUTION INTRAVENOUS at 17:13

## 2022-01-01 RX ADMIN — PREDNISONE 40 MG: 20 TABLET ORAL at 17:25

## 2022-01-01 RX ADMIN — SODIUM BICARBONATE 650 MG: 650 TABLET ORAL at 04:02

## 2022-01-01 RX ADMIN — ONDANSETRON 4 MG: 2 INJECTION INTRAMUSCULAR; INTRAVENOUS at 12:18

## 2022-01-01 RX ADMIN — AMLODIPINE BESYLATE 10 MG: 5 TABLET ORAL at 05:24

## 2022-01-01 RX ADMIN — HYDROMORPHONE HYDROCHLORIDE 2 MG: 2 INJECTION INTRAMUSCULAR; INTRAVENOUS; SUBCUTANEOUS at 23:22

## 2022-01-01 RX ADMIN — HYDRALAZINE HYDROCHLORIDE 20 MG: 20 INJECTION INTRAMUSCULAR; INTRAVENOUS at 17:58

## 2022-01-01 RX ADMIN — PROPOFOL 25 MG: 10 INJECTION, EMULSION INTRAVENOUS at 12:59

## 2022-01-01 RX ADMIN — HYDROMORPHONE HYDROCHLORIDE 2 MG: 2 INJECTION INTRAMUSCULAR; INTRAVENOUS; SUBCUTANEOUS at 13:58

## 2022-01-01 RX ADMIN — AMLODIPINE BESYLATE 10 MG: 5 TABLET ORAL at 05:20

## 2022-01-01 RX ADMIN — POTASSIUM CHLORIDE 10 MEQ: 7.46 INJECTION, SOLUTION INTRAVENOUS at 11:21

## 2022-01-01 RX ADMIN — HYDROMORPHONE HYDROCHLORIDE 2 MG: 2 INJECTION INTRAMUSCULAR; INTRAVENOUS; SUBCUTANEOUS at 17:46

## 2022-01-01 RX ADMIN — ONDANSETRON 8 MG: 2 INJECTION INTRAMUSCULAR; INTRAVENOUS at 20:43

## 2022-01-01 RX ADMIN — POTASSIUM CHLORIDE AND SODIUM CHLORIDE: 900; 150 INJECTION, SOLUTION INTRAVENOUS at 04:33

## 2022-01-01 RX ADMIN — ONDANSETRON 4 MG: 2 INJECTION INTRAMUSCULAR; INTRAVENOUS at 06:10

## 2022-01-01 RX ADMIN — LEVETIRACETAM 500 MG: 500 TABLET, FILM COATED ORAL at 09:16

## 2022-01-01 RX ADMIN — ONDANSETRON 8 MG: 2 INJECTION INTRAMUSCULAR; INTRAVENOUS at 20:53

## 2022-01-01 RX ADMIN — METOPROLOL TARTRATE 5 MG: 1 INJECTION, SOLUTION INTRAVENOUS at 23:54

## 2022-01-01 RX ADMIN — AMLODIPINE BESYLATE 10 MG: 10 TABLET ORAL at 05:09

## 2022-01-01 RX ADMIN — OMEPRAZOLE 20 MG: 20 CAPSULE, DELAYED RELEASE ORAL at 17:47

## 2022-01-01 RX ADMIN — LORAZEPAM 1 MG: 2 SOLUTION, CONCENTRATE ORAL at 03:15

## 2022-01-01 RX ADMIN — POTASSIUM CHLORIDE AND SODIUM CHLORIDE: 900; 150 INJECTION, SOLUTION INTRAVENOUS at 09:20

## 2022-01-01 RX ADMIN — DIBASIC SODIUM PHOSPHATE, MONOBASIC POTASSIUM PHOSPHATE AND MONOBASIC SODIUM PHOSPHATE 250 MG: 852; 155; 130 TABLET ORAL at 16:30

## 2022-01-01 RX ADMIN — MORPHINE SULFATE 20 MG: 100 SOLUTION ORAL at 22:09

## 2022-01-01 RX ADMIN — SODIUM BICARBONATE 650 MG: 650 TABLET ORAL at 04:48

## 2022-01-01 RX ADMIN — LEVETIRACETAM 500 MG: 500 TABLET, FILM COATED ORAL at 09:10

## 2022-01-01 RX ADMIN — MORPHINE SULFATE 20 MG: 100 SOLUTION ORAL at 21:59

## 2022-01-01 RX ADMIN — ONDANSETRON 4 MG: 2 INJECTION INTRAMUSCULAR; INTRAVENOUS at 05:45

## 2022-01-01 RX ADMIN — MORPHINE SULFATE 20 MG: 100 SOLUTION ORAL at 10:38

## 2022-01-01 RX ADMIN — SUCRALFATE 1 G: 1 SUSPENSION ORAL at 18:00

## 2022-01-01 RX ADMIN — SODIUM BICARBONATE 650 MG: 650 TABLET ORAL at 05:09

## 2022-01-01 RX ADMIN — POTASSIUM CHLORIDE 40 MEQ: 1500 TABLET, EXTENDED RELEASE ORAL at 05:24

## 2022-01-01 RX ADMIN — HEPARIN SODIUM 5000 UNITS: 5000 INJECTION, SOLUTION INTRAVENOUS; SUBCUTANEOUS at 13:52

## 2022-01-01 RX ADMIN — POTASSIUM CHLORIDE 10 MEQ: 7.46 INJECTION, SOLUTION INTRAVENOUS at 03:21

## 2022-01-01 RX ADMIN — MORPHINE SULFATE 4 MG: 4 INJECTION INTRAVENOUS at 15:01

## 2022-01-01 RX ADMIN — LORAZEPAM 2 MG: 2 INJECTION INTRAMUSCULAR; INTRAVENOUS at 14:24

## 2022-01-01 RX ADMIN — POTASSIUM CHLORIDE 10 MEQ: 7.46 INJECTION, SOLUTION INTRAVENOUS at 01:55

## 2022-01-01 RX ADMIN — DIBASIC SODIUM PHOSPHATE, MONOBASIC POTASSIUM PHOSPHATE AND MONOBASIC SODIUM PHOSPHATE 250 MG: 852; 155; 130 TABLET ORAL at 05:24

## 2022-01-01 RX ADMIN — OMEPRAZOLE 20 MG: 20 CAPSULE, DELAYED RELEASE ORAL at 18:35

## 2022-01-01 RX ADMIN — FENTANYL CITRATE 25 MCG: 50 INJECTION, SOLUTION INTRAMUSCULAR; INTRAVENOUS at 14:58

## 2022-01-01 RX ADMIN — Medication 100 MG: at 00:56

## 2022-01-01 RX ADMIN — HYDROMORPHONE HYDROCHLORIDE 2 MG: 2 INJECTION INTRAMUSCULAR; INTRAVENOUS; SUBCUTANEOUS at 20:33

## 2022-01-01 RX ADMIN — PROPOFOL 40 MG: 10 INJECTION, EMULSION INTRAVENOUS at 12:54

## 2022-01-01 RX ADMIN — SUCRALFATE 1 G: 1 SUSPENSION ORAL at 18:35

## 2022-01-01 RX ADMIN — POTASSIUM CHLORIDE 10 MEQ: 7.46 INJECTION, SOLUTION INTRAVENOUS at 03:07

## 2022-01-01 RX ADMIN — METOPROLOL TARTRATE 25 MG: 25 TABLET, FILM COATED ORAL at 06:11

## 2022-01-01 RX ADMIN — OXYCODONE 10 MG: 5 TABLET ORAL at 20:15

## 2022-01-01 RX ADMIN — ONDANSETRON 4 MG: 2 INJECTION INTRAMUSCULAR; INTRAVENOUS at 09:17

## 2022-01-01 RX ADMIN — DEXTROSE MONOHYDRATE: 50 INJECTION, SOLUTION INTRAVENOUS at 06:04

## 2022-01-01 RX ADMIN — ONDANSETRON 4 MG: 2 INJECTION INTRAMUSCULAR; INTRAVENOUS at 07:58

## 2022-01-01 RX ADMIN — LOPERAMIDE HYDROCHLORIDE 2 MG: 2 CAPSULE ORAL at 11:01

## 2022-01-01 RX ADMIN — METOPROLOL TARTRATE 25 MG: 25 TABLET, FILM COATED ORAL at 06:53

## 2022-01-01 RX ADMIN — POTASSIUM CHLORIDE 40 MEQ: 1500 TABLET, EXTENDED RELEASE ORAL at 11:01

## 2022-01-01 RX ADMIN — CARISOPRODOL 350 MG: 350 TABLET ORAL at 19:33

## 2022-01-01 RX ADMIN — SODIUM CHLORIDE 400 MG: 9 INJECTION, SOLUTION INTRAVENOUS at 16:17

## 2022-01-01 RX ADMIN — IOHEXOL 25 ML: 240 INJECTION, SOLUTION INTRATHECAL; INTRAVASCULAR; INTRAVENOUS; ORAL at 10:31

## 2022-01-01 RX ADMIN — AMLODIPINE BESYLATE 10 MG: 5 TABLET ORAL at 05:42

## 2022-01-01 RX ADMIN — LORAZEPAM 2 MG: 2 INJECTION INTRAMUSCULAR; INTRAVENOUS at 02:01

## 2022-01-01 RX ADMIN — MAGNESIUM SULFATE HEPTAHYDRATE 4 G: 40 INJECTION, SOLUTION INTRAVENOUS at 20:00

## 2022-01-01 RX ADMIN — MIDAZOLAM HYDROCHLORIDE 1 MG: 1 INJECTION, SOLUTION INTRAMUSCULAR; INTRAVENOUS at 09:09

## 2022-01-01 RX ADMIN — LINEZOLID 600 MG: 600 INJECTION, SOLUTION INTRAVENOUS at 06:44

## 2022-01-01 RX ADMIN — AMPICILLIN AND SULBACTAM 3 G: 1; 2 INJECTION, POWDER, FOR SOLUTION INTRAMUSCULAR; INTRAVENOUS at 05:10

## 2022-01-01 RX ADMIN — LEVETIRACETAM 500 MG: 500 TABLET, FILM COATED ORAL at 21:08

## 2022-01-01 RX ADMIN — HYDROMORPHONE HYDROCHLORIDE 1 MG: 1 INJECTION, SOLUTION INTRAMUSCULAR; INTRAVENOUS; SUBCUTANEOUS at 18:12

## 2022-01-01 RX ADMIN — AMPICILLIN AND SULBACTAM 3 G: 1; 2 INJECTION, POWDER, FOR SOLUTION INTRAMUSCULAR; INTRAVENOUS at 13:03

## 2022-01-01 RX ADMIN — LORAZEPAM 2 MG: 2 INJECTION INTRAMUSCULAR; INTRAVENOUS at 10:09

## 2022-01-01 RX ADMIN — HEPARIN SODIUM 5000 UNITS: 5000 INJECTION, SOLUTION INTRAVENOUS; SUBCUTANEOUS at 09:28

## 2022-01-01 RX ADMIN — HYDROMORPHONE HYDROCHLORIDE 2 MG: 2 INJECTION INTRAMUSCULAR; INTRAVENOUS; SUBCUTANEOUS at 21:33

## 2022-01-01 RX ADMIN — GEMCITABINE HYDROCHLORIDE 1447 MG: 200 INJECTION, POWDER, LYOPHILIZED, FOR SOLUTION INTRAVENOUS at 15:32

## 2022-01-01 RX ADMIN — LORAZEPAM 1 MG: 2 SOLUTION, CONCENTRATE ORAL at 04:39

## 2022-01-01 RX ADMIN — PROPOFOL 25 MG: 10 INJECTION, EMULSION INTRAVENOUS at 13:30

## 2022-01-01 RX ADMIN — POTASSIUM CHLORIDE 20 MEQ: 14.9 INJECTION, SOLUTION INTRAVENOUS at 07:30

## 2022-01-01 RX ADMIN — DEXAMETHASONE SODIUM PHOSPHATE 12 MG: 4 INJECTION, SOLUTION INTRA-ARTICULAR; INTRALESIONAL; INTRAMUSCULAR; INTRAVENOUS; SOFT TISSUE at 16:10

## 2022-01-01 RX ADMIN — SODIUM BICARBONATE 650 MG: 650 TABLET ORAL at 12:36

## 2022-01-01 RX ADMIN — SODIUM CHLORIDE, POTASSIUM CHLORIDE, SODIUM LACTATE AND CALCIUM CHLORIDE: 600; 310; 30; 20 INJECTION, SOLUTION INTRAVENOUS at 18:14

## 2022-01-01 RX ADMIN — METOPROLOL TARTRATE 25 MG: 25 TABLET, FILM COATED ORAL at 17:07

## 2022-01-01 RX ADMIN — CLONAZEPAM 2 MG: 1 TABLET ORAL at 18:38

## 2022-01-01 RX ADMIN — MIDAZOLAM 2 MG: 1 INJECTION INTRAMUSCULAR; INTRAVENOUS at 12:46

## 2022-01-01 RX ADMIN — DEXAMETHASONE SODIUM PHOSPHATE 12 MG: 4 INJECTION, SOLUTION INTRA-ARTICULAR; INTRALESIONAL; INTRAMUSCULAR; INTRAVENOUS; SOFT TISSUE at 10:38

## 2022-01-01 RX ADMIN — MAGNESIUM SULFATE HEPTAHYDRATE 4 G: 40 INJECTION, SOLUTION INTRAVENOUS at 11:26

## 2022-01-01 RX ADMIN — DOCUSATE SODIUM 50 MG AND SENNOSIDES 8.6 MG 2 TABLET: 8.6; 5 TABLET, FILM COATED ORAL at 18:00

## 2022-01-01 RX ADMIN — GEMCITABINE HYDROCHLORIDE 1493 MG: 200 INJECTION, POWDER, LYOPHILIZED, FOR SOLUTION INTRAVENOUS at 17:43

## 2022-01-01 RX ADMIN — MORPHINE SULFATE 20 MG: 100 SOLUTION ORAL at 21:50

## 2022-01-01 RX ADMIN — HYDRALAZINE HYDROCHLORIDE 10 MG: 20 INJECTION INTRAMUSCULAR; INTRAVENOUS at 10:34

## 2022-01-01 RX ADMIN — LOPERAMIDE HYDROCHLORIDE 4 MG: 2 CAPSULE ORAL at 17:46

## 2022-01-01 RX ADMIN — MORPHINE SULFATE 20 MG: 100 SOLUTION ORAL at 17:46

## 2022-01-01 RX ADMIN — HYDROMORPHONE HYDROCHLORIDE 2 MG: 2 INJECTION INTRAMUSCULAR; INTRAVENOUS; SUBCUTANEOUS at 05:41

## 2022-01-01 RX ADMIN — POTASSIUM CHLORIDE 10 MEQ: 7.46 INJECTION, SOLUTION INTRAVENOUS at 12:14

## 2022-01-01 RX ADMIN — AMLODIPINE BESYLATE 10 MG: 10 TABLET ORAL at 06:10

## 2022-01-01 RX ADMIN — POTASSIUM CHLORIDE 40 MEQ: 1500 TABLET, EXTENDED RELEASE ORAL at 12:00

## 2022-01-01 RX ADMIN — CLONAZEPAM 2 MG: 1 TABLET ORAL at 08:05

## 2022-01-01 RX ADMIN — AMPICILLIN AND SULBACTAM 3 G: 1; 2 INJECTION, POWDER, FOR SOLUTION INTRAMUSCULAR; INTRAVENOUS at 18:13

## 2022-01-01 RX ADMIN — FENTANYL CITRATE 50 MCG: 50 INJECTION, SOLUTION INTRAMUSCULAR; INTRAVENOUS at 09:09

## 2022-01-01 RX ADMIN — ACETAMINOPHEN 325 MG: 325 TABLET, FILM COATED ORAL at 19:36

## 2022-01-01 RX ADMIN — IOHEXOL 100 ML: 350 INJECTION, SOLUTION INTRAVENOUS at 23:30

## 2022-01-01 RX ADMIN — SODIUM BICARBONATE 650 MG: 650 TABLET ORAL at 16:51

## 2022-01-01 RX ADMIN — ONDANSETRON 4 MG: 4 TABLET, ORALLY DISINTEGRATING ORAL at 11:32

## 2022-01-01 RX ADMIN — POTASSIUM CHLORIDE 10 MEQ: 7.46 INJECTION, SOLUTION INTRAVENOUS at 09:28

## 2022-01-01 RX ADMIN — ENOXAPARIN SODIUM 40 MG: 40 INJECTION SUBCUTANEOUS at 17:37

## 2022-01-01 RX ADMIN — LORAZEPAM 2 MG: 2 INJECTION INTRAMUSCULAR; INTRAVENOUS at 02:11

## 2022-01-01 RX ADMIN — AMPICILLIN AND SULBACTAM 3 G: 1; 2 INJECTION, POWDER, FOR SOLUTION INTRAMUSCULAR; INTRAVENOUS at 23:04

## 2022-01-01 RX ADMIN — SODIUM CHLORIDE: 9 INJECTION, SOLUTION INTRAVENOUS at 13:31

## 2022-01-01 RX ADMIN — NYSTATIN: 100000 POWDER TOPICAL at 09:37

## 2022-01-01 RX ADMIN — CARBOPLATIN 276 MG: 10 INJECTION, SOLUTION INTRAVENOUS at 17:58

## 2022-01-01 RX ADMIN — SODIUM BICARBONATE 650 MG: 650 TABLET ORAL at 05:20

## 2022-01-01 RX ADMIN — LEVETIRACETAM 500 MG: 500 TABLET, FILM COATED ORAL at 04:44

## 2022-01-01 RX ADMIN — SUCRALFATE 1 G: 1 SUSPENSION ORAL at 01:01

## 2022-01-01 RX ADMIN — MORPHINE SULFATE 20 MG: 100 SOLUTION ORAL at 14:16

## 2022-01-01 RX ADMIN — LEVETIRACETAM 500 MG: 500 TABLET, FILM COATED ORAL at 11:06

## 2022-01-01 RX ADMIN — ONDANSETRON 8 MG: 2 INJECTION INTRAMUSCULAR; INTRAVENOUS at 16:48

## 2022-01-01 RX ADMIN — PANTOPRAZOLE SODIUM 40 MG: 40 INJECTION, POWDER, FOR SOLUTION INTRAVENOUS at 17:13

## 2022-01-01 RX ADMIN — SUCRALFATE 1 G: 1 SUSPENSION ORAL at 00:11

## 2022-01-01 RX ADMIN — HYDROMORPHONE HYDROCHLORIDE 1 MG: 1 INJECTION, SOLUTION INTRAMUSCULAR; INTRAVENOUS; SUBCUTANEOUS at 18:05

## 2022-01-01 RX ADMIN — LEVETIRACETAM 500 MG: 500 TABLET, FILM COATED ORAL at 18:02

## 2022-01-01 RX ADMIN — LEVETIRACETAM 500 MG: 500 TABLET, FILM COATED ORAL at 17:05

## 2022-01-01 RX ADMIN — PROPOFOL 30 MG: 10 INJECTION, EMULSION INTRAVENOUS at 13:14

## 2022-01-01 RX ADMIN — LORAZEPAM 1 MG: 2 SOLUTION, CONCENTRATE ORAL at 06:03

## 2022-01-01 RX ADMIN — LORAZEPAM 1 MG: 2 SOLUTION, CONCENTRATE ORAL at 20:44

## 2022-01-01 RX ADMIN — POTASSIUM CHLORIDE 10 MEQ: 7.46 INJECTION, SOLUTION INTRAVENOUS at 21:26

## 2022-01-01 RX ADMIN — ONDANSETRON 4 MG: 2 INJECTION INTRAMUSCULAR; INTRAVENOUS at 14:10

## 2022-01-01 RX ADMIN — POTASSIUM CHLORIDE 40 MEQ: 1500 TABLET, EXTENDED RELEASE ORAL at 04:24

## 2022-01-01 RX ADMIN — POTASSIUM CHLORIDE AND SODIUM CHLORIDE: 900; 150 INJECTION, SOLUTION INTRAVENOUS at 23:20

## 2022-01-01 RX ADMIN — POTASSIUM CHLORIDE 40 MEQ: 1500 TABLET, EXTENDED RELEASE ORAL at 00:56

## 2022-01-01 RX ADMIN — OMEPRAZOLE 40 MG: KIT at 04:48

## 2022-01-01 RX ADMIN — SODIUM CHLORIDE, POTASSIUM CHLORIDE, SODIUM LACTATE AND CALCIUM CHLORIDE 500 ML: 600; 310; 30; 20 INJECTION, SOLUTION INTRAVENOUS at 17:31

## 2022-01-01 RX ADMIN — LOPERAMIDE HYDROCHLORIDE 4 MG: 2 CAPSULE ORAL at 08:52

## 2022-01-01 RX ADMIN — SUCRALFATE 1 G: 1 SUSPENSION ORAL at 17:03

## 2022-01-01 RX ADMIN — PREDNISONE 40 MG: 20 TABLET ORAL at 09:06

## 2022-01-01 RX ADMIN — INSULIN HUMAN 2 UNITS: 100 INJECTION, SOLUTION PARENTERAL at 12:13

## 2022-01-01 RX ADMIN — FENTANYL CITRATE 50 MCG: 50 INJECTION, SOLUTION INTRAMUSCULAR; INTRAVENOUS at 15:03

## 2022-01-01 RX ADMIN — LEVETIRACETAM 500 MG: 500 TABLET, FILM COATED ORAL at 21:26

## 2022-01-01 RX ADMIN — SODIUM CHLORIDE, POTASSIUM CHLORIDE, SODIUM LACTATE AND CALCIUM CHLORIDE 1572 ML: 600; 310; 30; 20 INJECTION, SOLUTION INTRAVENOUS at 09:39

## 2022-01-01 RX ADMIN — PREDNISONE 40 MG: 20 TABLET ORAL at 08:09

## 2022-01-01 RX ADMIN — PIPERACILLIN AND TAZOBACTAM 4.5 G: 4; .5 INJECTION, POWDER, LYOPHILIZED, FOR SOLUTION INTRAVENOUS; PARENTERAL at 20:25

## 2022-01-01 RX ADMIN — PROPOFOL 30 MG: 10 INJECTION, EMULSION INTRAVENOUS at 12:50

## 2022-01-01 RX ADMIN — SUCRALFATE 1 G: 1 SUSPENSION ORAL at 06:10

## 2022-01-01 RX ADMIN — GEMCITABINE 1470 MG: 38 INJECTION, POWDER, LYOPHILIZED, FOR SOLUTION INTRAVENOUS at 15:05

## 2022-01-01 RX ADMIN — DEXTROSE MONOHYDRATE: 50 INJECTION, SOLUTION INTRAVENOUS at 01:01

## 2022-01-01 RX ADMIN — LEVETIRACETAM 500 MG: 500 TABLET, FILM COATED ORAL at 20:27

## 2022-01-01 RX ADMIN — OXYCODONE HYDROCHLORIDE 10 MG: 10 TABLET ORAL at 08:26

## 2022-01-01 RX ADMIN — SODIUM BICARBONATE 650 MG: 650 TABLET ORAL at 10:41

## 2022-01-01 RX ADMIN — LORAZEPAM 1 MG: 2 SOLUTION, CONCENTRATE ORAL at 06:25

## 2022-01-01 RX ADMIN — DEXAMETHASONE SODIUM PHOSPHATE 12 MG: 4 INJECTION, SOLUTION INTRA-ARTICULAR; INTRALESIONAL; INTRAMUSCULAR; INTRAVENOUS; SOFT TISSUE at 16:53

## 2022-01-01 RX ADMIN — Medication 100 MG: at 17:05

## 2022-01-01 RX ADMIN — SUCRALFATE 1 G: 1 SUSPENSION ORAL at 14:07

## 2022-01-01 RX ADMIN — Medication 100 MG: at 16:30

## 2022-01-01 RX ADMIN — LEVETIRACETAM 500 MG: 500 TABLET, FILM COATED ORAL at 21:23

## 2022-01-01 RX ADMIN — OXYCODONE 10 MG: 5 TABLET ORAL at 18:35

## 2022-01-01 RX ADMIN — PROPOFOL 70 MG: 10 INJECTION, EMULSION INTRAVENOUS at 09:52

## 2022-01-01 RX ADMIN — SODIUM BICARBONATE 650 MG: 650 TABLET ORAL at 06:53

## 2022-01-01 RX ADMIN — NOREPINEPHRINE BITARTRATE 25 MCG/MIN: 1 INJECTION, SOLUTION, CONCENTRATE INTRAVENOUS at 04:39

## 2022-01-01 RX ADMIN — LORAZEPAM 1 MG: 2 SOLUTION, CONCENTRATE ORAL at 22:13

## 2022-01-01 RX ADMIN — ONDANSETRON 16 MG: 2 INJECTION INTRAMUSCULAR; INTRAVENOUS at 15:20

## 2022-01-01 RX ADMIN — ONDANSETRON 8 MG: 8 TABLET, ORALLY DISINTEGRATING ORAL at 17:49

## 2022-01-01 RX ADMIN — DEXMEDETOMIDINE 0.6 MCG/KG/HR: 200 INJECTION, SOLUTION INTRAVENOUS at 21:29

## 2022-01-01 RX ADMIN — AMPICILLIN AND SULBACTAM 3 G: 1; 2 INJECTION, POWDER, FOR SOLUTION INTRAMUSCULAR; INTRAVENOUS at 18:00

## 2022-01-01 RX ADMIN — SODIUM BICARBONATE 650 MG: 650 TABLET ORAL at 13:43

## 2022-01-01 RX ADMIN — OXYCODONE 5 MG: 5 TABLET ORAL at 02:40

## 2022-01-01 ASSESSMENT — COGNITIVE AND FUNCTIONAL STATUS - GENERAL
DRESSING REGULAR UPPER BODY CLOTHING: A LOT
DAILY ACTIVITIY SCORE: 17
DRESSING REGULAR UPPER BODY CLOTHING: A LITTLE
CLIMB 3 TO 5 STEPS WITH RAILING: A LOT
MOVING FROM LYING ON BACK TO SITTING ON SIDE OF FLAT BED: UNABLE
CLIMB 3 TO 5 STEPS WITH RAILING: TOTAL
TOILETING: A LOT
MOBILITY SCORE: 13
STANDING UP FROM CHAIR USING ARMS: A LOT
MOBILITY SCORE: 21
TOILETING: A LOT
STANDING UP FROM CHAIR USING ARMS: A LITTLE
DAILY ACTIVITIY SCORE: 12
MOVING TO AND FROM BED TO CHAIR: A LITTLE
WALKING IN HOSPITAL ROOM: A LITTLE
EATING MEALS: A LOT
STANDING UP FROM CHAIR USING ARMS: A LITTLE
MOVING TO AND FROM BED TO CHAIR: UNABLE
TURNING FROM BACK TO SIDE WHILE IN FLAT BAD: A LOT
CLIMB 3 TO 5 STEPS WITH RAILING: A LITTLE
MOBILITY SCORE: 21
SUGGESTED CMS G CODE MODIFIER DAILY ACTIVITY: CL
WALKING IN HOSPITAL ROOM: A LOT
DAILY ACTIVITIY SCORE: 12
SUGGESTED CMS G CODE MODIFIER DAILY ACTIVITY: CL
DAILY ACTIVITIY SCORE: 12
MOVING FROM LYING ON BACK TO SITTING ON SIDE OF FLAT BED: A LITTLE
PERSONAL GROOMING: A LOT
MOBILITY SCORE: 14
SUGGESTED CMS G CODE MODIFIER MOBILITY: CL
DRESSING REGULAR UPPER BODY CLOTHING: A LITTLE
TURNING FROM BACK TO SIDE WHILE IN FLAT BAD: A LOT
DRESSING REGULAR UPPER BODY CLOTHING: A LOT
DRESSING REGULAR UPPER BODY CLOTHING: A LOT
MOVING FROM LYING ON BACK TO SITTING ON SIDE OF FLAT BED: A LITTLE
WALKING IN HOSPITAL ROOM: A LITTLE
PERSONAL GROOMING: A LOT
EATING MEALS: A LOT
DRESSING REGULAR LOWER BODY CLOTHING: A LOT
PERSONAL GROOMING: A LITTLE
TOILETING: A LOT
WALKING IN HOSPITAL ROOM: A LOT
EATING MEALS: A LOT
WALKING IN HOSPITAL ROOM: A LITTLE
TOILETING: A LOT
STANDING UP FROM CHAIR USING ARMS: A LOT
MOBILITY SCORE: 8
SUGGESTED CMS G CODE MODIFIER DAILY ACTIVITY: CL
WALKING IN HOSPITAL ROOM: A LOT
MOVING TO AND FROM BED TO CHAIR: A LOT
HELP NEEDED FOR BATHING: A LOT
WALKING IN HOSPITAL ROOM: A LITTLE
TURNING FROM BACK TO SIDE WHILE IN FLAT BAD: A LOT
MOVING FROM LYING ON BACK TO SITTING ON SIDE OF FLAT BED: UNABLE
MOVING FROM LYING ON BACK TO SITTING ON SIDE OF FLAT BED: UNABLE
HELP NEEDED FOR BATHING: A LOT
DRESSING REGULAR UPPER BODY CLOTHING: A LOT
HELP NEEDED FOR BATHING: A LOT
MOVING TO AND FROM BED TO CHAIR: A LOT
DRESSING REGULAR LOWER BODY CLOTHING: A LOT
CLIMB 3 TO 5 STEPS WITH RAILING: A LOT
DRESSING REGULAR LOWER BODY CLOTHING: A LOT
SUGGESTED CMS G CODE MODIFIER MOBILITY: CL
WALKING IN HOSPITAL ROOM: A LITTLE
SUGGESTED CMS G CODE MODIFIER MOBILITY: CL
WALKING IN HOSPITAL ROOM: TOTAL
DRESSING REGULAR LOWER BODY CLOTHING: A LOT
CLIMB 3 TO 5 STEPS WITH RAILING: TOTAL
HELP NEEDED FOR BATHING: A LOT
PERSONAL GROOMING: A LOT
CLIMB 3 TO 5 STEPS WITH RAILING: A LOT
STANDING UP FROM CHAIR USING ARMS: A LOT
DAILY ACTIVITIY SCORE: 12
MOVING FROM LYING ON BACK TO SITTING ON SIDE OF FLAT BED: A LITTLE
STANDING UP FROM CHAIR USING ARMS: A LITTLE
MOVING FROM LYING ON BACK TO SITTING ON SIDE OF FLAT BED: A LOT
DRESSING REGULAR UPPER BODY CLOTHING: A LOT
HELP NEEDED FOR BATHING: A LITTLE
PERSONAL GROOMING: TOTAL
STANDING UP FROM CHAIR USING ARMS: A LOT
CLIMB 3 TO 5 STEPS WITH RAILING: A LOT
DRESSING REGULAR UPPER BODY CLOTHING: A LOT
MOVING FROM LYING ON BACK TO SITTING ON SIDE OF FLAT BED: A LOT
PERSONAL GROOMING: A LOT
PERSONAL GROOMING: A LITTLE
SUGGESTED CMS G CODE MODIFIER MOBILITY: CM
TOILETING: A LITTLE
TURNING FROM BACK TO SIDE WHILE IN FLAT BAD: A LITTLE
DRESSING REGULAR LOWER BODY CLOTHING: A LITTLE
MOVING TO AND FROM BED TO CHAIR: UNABLE
SUGGESTED CMS G CODE MODIFIER DAILY ACTIVITY: CK
DAILY ACTIVITIY SCORE: 21
DRESSING REGULAR LOWER BODY CLOTHING: A LITTLE
SUGGESTED CMS G CODE MODIFIER MOBILITY: CJ
CLIMB 3 TO 5 STEPS WITH RAILING: A LOT
DAILY ACTIVITIY SCORE: 12
SUGGESTED CMS G CODE MODIFIER MOBILITY: CL
MOVING TO AND FROM BED TO CHAIR: UNABLE
WALKING IN HOSPITAL ROOM: A LITTLE
CLIMB 3 TO 5 STEPS WITH RAILING: A LOT
SUGGESTED CMS G CODE MODIFIER DAILY ACTIVITY: CK
MOVING TO AND FROM BED TO CHAIR: A LITTLE
TOILETING: A LOT
WALKING IN HOSPITAL ROOM: A LOT
MOBILITY SCORE: 10
TURNING FROM BACK TO SIDE WHILE IN FLAT BAD: A LITTLE
DRESSING REGULAR LOWER BODY CLOTHING: A LITTLE
MOVING TO AND FROM BED TO CHAIR: UNABLE
MOVING TO AND FROM BED TO CHAIR: A LOT
DRESSING REGULAR LOWER BODY CLOTHING: A LOT
DAILY ACTIVITIY SCORE: 15
TOILETING: A LOT
CLIMB 3 TO 5 STEPS WITH RAILING: A LOT
EATING MEALS: TOTAL
PERSONAL GROOMING: A LOT
HELP NEEDED FOR BATHING: A LOT
DRESSING REGULAR LOWER BODY CLOTHING: A LOT
DRESSING REGULAR UPPER BODY CLOTHING: A LOT
SUGGESTED CMS G CODE MODIFIER MOBILITY: CM
MOVING FROM LYING ON BACK TO SITTING ON SIDE OF FLAT BED: UNABLE
CLIMB 3 TO 5 STEPS WITH RAILING: TOTAL
TOILETING: A LOT
STANDING UP FROM CHAIR USING ARMS: A LITTLE
PERSONAL GROOMING: A LITTLE
MOVING TO AND FROM BED TO CHAIR: UNABLE
HELP NEEDED FOR BATHING: A LITTLE
DRESSING REGULAR LOWER BODY CLOTHING: A LOT
SUGGESTED CMS G CODE MODIFIER DAILY ACTIVITY: CL
HELP NEEDED FOR BATHING: A LOT
MOBILITY SCORE: 13
WALKING IN HOSPITAL ROOM: TOTAL
HELP NEEDED FOR BATHING: A LITTLE
SUGGESTED CMS G CODE MODIFIER DAILY ACTIVITY: CL
CLIMB 3 TO 5 STEPS WITH RAILING: A LITTLE
CLIMB 3 TO 5 STEPS WITH RAILING: A LOT
MOBILITY SCORE: 10
EATING MEALS: A LOT
TOILETING: A LITTLE
SUGGESTED CMS G CODE MODIFIER MOBILITY: CK
STANDING UP FROM CHAIR USING ARMS: A LITTLE
MOBILITY SCORE: 19
MOBILITY SCORE: 18
HELP NEEDED FOR BATHING: A LITTLE
TURNING FROM BACK TO SIDE WHILE IN FLAT BAD: UNABLE
STANDING UP FROM CHAIR USING ARMS: A LOT
WALKING IN HOSPITAL ROOM: TOTAL
SUGGESTED CMS G CODE MODIFIER DAILY ACTIVITY: CJ
TURNING FROM BACK TO SIDE WHILE IN FLAT BAD: UNABLE
STANDING UP FROM CHAIR USING ARMS: A LITTLE
CLIMB 3 TO 5 STEPS WITH RAILING: A LOT
SUGGESTED CMS G CODE MODIFIER MOBILITY: CJ
SUGGESTED CMS G CODE MODIFIER MOBILITY: CL
SUGGESTED CMS G CODE MODIFIER MOBILITY: CM
MOBILITY SCORE: 7
HELP NEEDED FOR BATHING: A LOT
DAILY ACTIVITIY SCORE: 10
SUGGESTED CMS G CODE MODIFIER DAILY ACTIVITY: CL
SUGGESTED CMS G CODE MODIFIER DAILY ACTIVITY: CL
PERSONAL GROOMING: A LOT
MOVING TO AND FROM BED TO CHAIR: UNABLE
STANDING UP FROM CHAIR USING ARMS: A LITTLE
SUGGESTED CMS G CODE MODIFIER MOBILITY: CK
DRESSING REGULAR LOWER BODY CLOTHING: A LOT
SUGGESTED CMS G CODE MODIFIER MOBILITY: CK
DRESSING REGULAR UPPER BODY CLOTHING: A LITTLE
TURNING FROM BACK TO SIDE WHILE IN FLAT BAD: A LITTLE
MOVING FROM LYING ON BACK TO SITTING ON SIDE OF FLAT BED: UNABLE
EATING MEALS: A LITTLE
EATING MEALS: A LOT
MOVING FROM LYING ON BACK TO SITTING ON SIDE OF FLAT BED: UNABLE
TOILETING: A LOT
TOILETING: A LOT
TURNING FROM BACK TO SIDE WHILE IN FLAT BAD: A LITTLE
DAILY ACTIVITIY SCORE: 21
STANDING UP FROM CHAIR USING ARMS: A LITTLE
STANDING UP FROM CHAIR USING ARMS: A LITTLE
MOBILITY SCORE: 12
TURNING FROM BACK TO SIDE WHILE IN FLAT BAD: UNABLE
WALKING IN HOSPITAL ROOM: A LOT
HELP NEEDED FOR BATHING: A LOT
DAILY ACTIVITIY SCORE: 18
SUGGESTED CMS G CODE MODIFIER DAILY ACTIVITY: CJ
DRESSING REGULAR LOWER BODY CLOTHING: A LOT
MOVING FROM LYING ON BACK TO SITTING ON SIDE OF FLAT BED: UNABLE
DAILY ACTIVITIY SCORE: 12
SUGGESTED CMS G CODE MODIFIER MOBILITY: CL
MOBILITY SCORE: 16
EATING MEALS: A LOT
MOBILITY SCORE: 8
SUGGESTED CMS G CODE MODIFIER DAILY ACTIVITY: CK
TOILETING: A LOT

## 2022-01-01 ASSESSMENT — ENCOUNTER SYMPTOMS
DEPRESSION: 0
SHORTNESS OF BREATH: 0
CONSTIPATION: 0
DIARRHEA: 0
BLOOD IN STOOL: 0
WEAKNESS: 0
DIARRHEA: 0
PSYCHIATRIC NEGATIVE: 1
DEPRESSION: 1
CHILLS: 0
NAUSEA: 0
NERVOUS/ANXIOUS: 0
LOSS OF CONSCIOUSNESS: 0
DIARRHEA: 1
VOMITING: 0
NAUSEA: 1
FEVER: 0
PAIN SEVERITY GOAL: 2/10
DIZZINESS: 0
COUGH: 0
EYES NEGATIVE: 1
VOMITING: 0
SORE THROAT: 0
FEVER: 0
VOMITING: 0
ABDOMINAL PAIN: 0
HEARTBURN: 0
SHORTNESS OF BREATH: 0
SHORTNESS OF BREATH: 0
COUGH: 1
VOMITING: 1
BLURRED VISION: 0
CONSTIPATION: 0
PERSON REPORTING PAIN: DIRECT OBSERVATION
ABDOMINAL PAIN: 0
EYES NEGATIVE: 1
CHILLS: 0
DEPRESSION: 0
WHEEZING: 0
NAUSEA: 0
FALLS: 0
PALPITATIONS: 0
DEPRESSION: 0
VOMITING: 0
VOMITING: 1
WHEEZING: 0
SENSORY CHANGE: 0
CONSTIPATION: 0
FALLS: 0
PAIN LOCATION: GENERALIZED
CONSTIPATION: 0
FALLS: 0
DIAPHORESIS: 0
SHORTNESS OF BREATH: 0
ABDOMINAL PAIN: 0
DIARRHEA: 0
AGITATION: 1
EYE DISCHARGE: 0
ABDOMINAL PAIN: 0
WEAKNESS: 0
NAUSEA: 0
WHEEZING: 0
NAUSEA: 0
PALPITATIONS: 0
FEVER: 0
DIARRHEA: 0
COUGH: 0
COUGH: 0
FEVER: 0
BLOOD IN STOOL: 0
ABDOMINAL PAIN: 1
SHORTNESS OF BREATH: 0
NAUSEA: 1
CONSTIPATION: 0
CONSTIPATION: 0
VOMITING: 1
CONSTITUTIONAL NEGATIVE: 1
CONSTIPATION: 0
ABDOMINAL PAIN: 0
SEIZURES: 0
CONSTIPATION: 0
COUGH: 1
WEIGHT LOSS: 1
WEAKNESS: 1
COUGH: 0
FALLS: 0
FEVER: 0
DEPRESSION: 0
CHILLS: 0
ABDOMINAL PAIN: 1
NERVOUS/ANXIOUS: 0
FALLS: 1
PALPITATIONS: 0
PALPITATIONS: 0
FEVER: 0
WEIGHT LOSS: 1
CHILLS: 0
PALPITATIONS: 0
NERVOUS/ANXIOUS: 1
CONSTITUTIONAL NEGATIVE: 1
VOMITING: 0
DEPRESSION: 0
VOMITING: 0
WEAKNESS: 0
COUGH: 0
WEIGHT LOSS: 1
DIZZINESS: 0
DIAPHORESIS: 1
SHORTNESS OF BREATH: 0
CONSTIPATION: 0
FEVER: 0
NEUROLOGICAL NEGATIVE: 1
VOMITING: 0
NAUSEA: 0
WEAKNESS: 1
EYE DISCHARGE: 0
DIARRHEA: 0
HIGHEST PAIN SEVERITY IN PAST 24 HOURS: 8/10
FALLS: 0
PALPITATIONS: 0
VOMITING: 0
WEIGHT LOSS: 1
RESPIRATORY NEGATIVE: 1
FEVER: 0
WEAKNESS: 1
DIZZINESS: 0
FEVER: 0
NAUSEA: 0
NERVOUS/ANXIOUS: 0
FEVER: 0
SPEECH CHANGE: 0
HEADACHES: 0
SHORTNESS OF BREATH: 0
SHORTNESS OF BREATH: 0
DOUBLE VISION: 0
NEUROLOGICAL NEGATIVE: 1
FEVER: 0
NEUROLOGICAL NEGATIVE: 1
SORE THROAT: 1
LOWEST PAIN SEVERITY IN PAST 24 HOURS: 2/10
STRIDOR: 0
PALPITATIONS: 0
COUGH: 1
SHORTNESS OF BREATH: 0
DIZZINESS: 0
CARDIOVASCULAR NEGATIVE: 1
COUGH: 0
FEVER: 0
MUSCLE WEAKNESS: 1
ABDOMINAL PAIN: 1
ABDOMINAL PAIN: 0
WHEEZING: 1
NERVOUS/ANXIOUS: 0
ABDOMINAL PAIN: 1
WHEEZING: 0
WEAKNESS: 1
HEADACHES: 1
DIARRHEA: 1
RESPIRATORY NEGATIVE: 1
CONSTIPATION: 0
CONSTIPATION: 0
COUGH: 0
PALPITATIONS: 0
MUSCULOSKELETAL NEGATIVE: 1
ABDOMINAL PAIN: 0
ABDOMINAL PAIN: 0
VOMITING: 1
RESPIRATORY NEGATIVE: 1
NAUSEA: 0
COUGH: 0
WEAKNESS: 1
WEAKNESS: 0
ABDOMINAL PAIN: 0
NAUSEA: 0
ABDOMINAL PAIN: 0
NERVOUS/ANXIOUS: 0
FEVER: 0
PALPITATIONS: 0
SORE THROAT: 0
PALPITATIONS: 0
PAIN: 1
NAUSEA: 0
EYES NEGATIVE: 1
NERVOUS/ANXIOUS: 0
BACK PAIN: 0
NERVOUS/ANXIOUS: 0
CHILLS: 0
COUGH: 0
VOMITING: 0
COUGH: 0
PSYCHIATRIC NEGATIVE: 1
DIARRHEA: 1
CHILLS: 0
MYALGIAS: 0
STOOL FREQUENCY: LESS THAN DAILY
FALLS: 1
MYALGIAS: 1
FEVER: 0
FALLS: 1
DEPRESSION: 0
BRUISES/BLEEDS EASILY: 0
WEAKNESS: 0
PALPITATIONS: 0
MYALGIAS: 1
COUGH: 0
NERVOUS/ANXIOUS: 0
SHORTNESS OF BREATH: 0
SHORTNESS OF BREATH: 0
FEVER: 0
CHILLS: 0
NAUSEA: 0
SHORTNESS OF BREATH: 0
CHILLS: 0
PALPITATIONS: 0
WEIGHT LOSS: 0
SPUTUM PRODUCTION: 0
CHILLS: 0
HEADACHES: 0
VOMITING: 0
SHORTNESS OF BREATH: 0
DOUBLE VISION: 0
DIZZINESS: 0
SHORTNESS OF BREATH: 0
DIARRHEA: 0
BACK PAIN: 1
CHILLS: 0
MYALGIAS: 0
VOMITING: 0
NAUSEA: 1
NAUSEA: 1
STRIDOR: 0
DIARRHEA: 1
DIARRHEA: 1
NAUSEA: 0
CHILLS: 0
MYALGIAS: 1
CHILLS: 0
PALPITATIONS: 0
NAUSEA: 0
NAUSEA: 1
VOMITING: 0
NEUROLOGICAL NEGATIVE: 1
NERVOUS/ANXIOUS: 0
DEPRESSION: 0
DIARRHEA: 0
PAIN: 1
DIARRHEA: 0
DIARRHEA: 1
DIARRHEA: 1
DIARRHEA: 0
DIARRHEA: 0
PALPITATIONS: 0
CONSTIPATION: 0
BLURRED VISION: 0
FOCAL WEAKNESS: 0
NERVOUS/ANXIOUS: 0
DEPRESSION: 0
FEVER: 0
CHILLS: 0
FEVER: 0
CARDIOVASCULAR NEGATIVE: 1
FALLS: 0
ABDOMINAL PAIN: 0
BLOOD IN STOOL: 0
BLURRED VISION: 0
FEVER: 0
WEIGHT LOSS: 0
CHILLS: 0
PALPITATIONS: 0
SHORTNESS OF BREATH: 0
SHORTNESS OF BREATH: 0
NAUSEA: 1
WEAKNESS: 1
HEADACHES: 1
FLANK PAIN: 0
MYALGIAS: 0
CARDIOVASCULAR NEGATIVE: 1
WEIGHT LOSS: 1
CHILLS: 0
ORTHOPNEA: 0
SORE THROAT: 0
INSOMNIA: 1
CARDIOVASCULAR NEGATIVE: 1
VOMITING: 0
NERVOUS/ANXIOUS: 0
BACK PAIN: 0
NAUSEA: 0
DIARRHEA: 0
PERSON REPORTING PAIN: DIRECT OBSERVATION
SHORTNESS OF BREATH: 0
COUGH: 0
NAUSEA: 1
CHILLS: 0
ABDOMINAL PAIN: 0
WHEEZING: 0
LOSS OF CONSCIOUSNESS: 0
SHORTNESS OF BREATH: 1
SUBJECTIVE PAIN PROGRESSION: WAXING AND WANING
CHILLS: 0
CONSTIPATION: 0
WEIGHT LOSS: 1
CONSTITUTIONAL NEGATIVE: 1
COUGH: 0
SHORTNESS OF BREATH: 0
VOMITING: 0
WEIGHT LOSS: 1
SHORTNESS OF BREATH: 0
SHORTNESS OF BREATH: 0
NAUSEA: 0
WEAKNESS: 0
ABDOMINAL PAIN: 1
BLOOD IN STOOL: 0
DIARRHEA: 1
FALLS: 0
SPEECH CHANGE: 0
MYALGIAS: 0
COUGH: 0
PALPITATIONS: 0
CONSTIPATION: 0
DIARRHEA: 1
DIARRHEA: 1
BRUISES/BLEEDS EASILY: 0
FEVER: 0
DIARRHEA: 0
VOMITING: 0
EYES NEGATIVE: 1
AGITATION: 1
MUSCULOSKELETAL NEGATIVE: 1
NEUROLOGICAL NEGATIVE: 1
CHILLS: 0
FEVER: 0
RESPIRATORY NEGATIVE: 1
DEPRESSION: 0
DEPRESSION: 0
WEAKNESS: 0
DEPRESSION: 0
FALLS: 0
DIARRHEA: 1
CONSTIPATION: 0
NECK PAIN: 1
FEVER: 0
NERVOUS/ANXIOUS: 0
FALLS: 1
RESPIRATORY NEGATIVE: 1
ABDOMINAL PAIN: 0
CARDIOVASCULAR NEGATIVE: 1
DIARRHEA: 0
NERVOUS/ANXIOUS: 0
VOMITING: 0
FALLS: 0
CHILLS: 0
SUBJECTIVE PAIN PROGRESSION: WAXING AND WANING
FEVER: 0
WHEEZING: 0
ABDOMINAL PAIN: 1
VOMITING: 0
ABDOMINAL PAIN: 0
CHILLS: 0
UNABLE TO COMMUNICATE PAIN: 1
INSOMNIA: 0
HALLUCINATIONS: 0
COUGH: 1
INSOMNIA: 0
CHILLS: 0
CHILLS: 0
HEADACHES: 0
MYALGIAS: 1
COUGH: 0
COUGH: 0
NAUSEA: 0
PALPITATIONS: 0
LOWEST PAIN SEVERITY IN PAST 24 HOURS: 2/10
HEADACHES: 0
ABDOMINAL PAIN: 1
CARDIOVASCULAR NEGATIVE: 1
HIGHEST PAIN SEVERITY IN PAST 24 HOURS: 7/10
PALPITATIONS: 0
BACK PAIN: 0
DIARRHEA: 0
NAUSEA: 0
NAUSEA: 0
CHILLS: 0
DIARRHEA: 0
VOMITING: 0
CONSTIPATION: 0
PSYCHIATRIC NEGATIVE: 1
PALPITATIONS: 0
AGITATION: 1
HEADACHES: 0
FEVER: 0
COUGH: 0
WEAKNESS: 0
DIARRHEA: 0
ABDOMINAL PAIN: 0
SHORTNESS OF BREATH: 0
VOMITING: 0
LAST BOWEL MOVEMENT: 66453
PERSON REPORTING PAIN: DIRECT OBSERVATION
DIARRHEA: 0
FEVER: 0
FEVER: 0
BACK PAIN: 1
FEVER: 0
NAUSEA: 0
NERVOUS/ANXIOUS: 0
SENSORY CHANGE: 0
CHILLS: 0
DEPRESSION: 1
PSYCHIATRIC NEGATIVE: 1
CHILLS: 0
WHEEZING: 0
SHORTNESS OF BREATH: 1
DIARRHEA: 0
VOMITING: 0
MUSCULOSKELETAL NEGATIVE: 1
HEMOPTYSIS: 0
ORTHOPNEA: 0
SHORTNESS OF BREATH: 0
FEVER: 0
DIARRHEA: 1
EYES NEGATIVE: 1
PAIN SEVERITY GOAL: 2/10
FEVER: 0
VOMITING: 0
DIZZINESS: 0
FEVER: 0
SHORTNESS OF BREATH: 0
DIARRHEA: 0
BOWEL INCONTINENCE: 1
DEPRESSION: 0
NERVOUS/ANXIOUS: 0
SHORTNESS OF BREATH: 0
FALLS: 0
WEAKNESS: 0
HEADACHES: 1
COUGH: 0
PSYCHIATRIC NEGATIVE: 1
DIZZINESS: 0
HEARTBURN: 1
SHORTNESS OF BREATH: 0
EYES NEGATIVE: 1
VOMITING: 0
SHORTNESS OF BREATH: 0
NAUSEA: 0
VOMITING: 0
NAUSEA: 0
CHILLS: 1
CHANGE IN LEVEL OF CONSCIOUSNESS: 1
MUSCULOSKELETAL NEGATIVE: 1
PALPITATIONS: 0
RESPIRATORY NEGATIVE: 1
DEPRESSION: 0
BLOOD IN STOOL: 0
COUGH: 0
FEVER: 0
NAUSEA: 0
EYES NEGATIVE: 1
CHILLS: 0
CHILLS: 0
FALLS: 0
ABDOMINAL PAIN: 0
FEVER: 0
WEAKNESS: 0
HEADACHES: 0
ABDOMINAL PAIN: 0
SORE THROAT: 0
HEADACHES: 0
CHILLS: 0
ABDOMINAL PAIN: 0
DIZZINESS: 0
LOSS OF CONSCIOUSNESS: 0
DEPRESSION: 0
CHILLS: 0
CHILLS: 0
SHORTNESS OF BREATH: 0
DIARRHEA: 0
PALPITATIONS: 0
FALLS: 0
PALPITATIONS: 0
PALPITATIONS: 0
SHORTNESS OF BREATH: 0

## 2022-01-01 ASSESSMENT — PAIN DESCRIPTION - PAIN TYPE
TYPE: ACUTE PAIN
TYPE: ACUTE PAIN
TYPE: SURGICAL PAIN
TYPE: CHRONIC PAIN
TYPE: ACUTE PAIN
TYPE: ACUTE PAIN
TYPE: SURGICAL PAIN
TYPE: SURGICAL PAIN
TYPE: CHRONIC PAIN;ACUTE PAIN
TYPE: SURGICAL PAIN
TYPE: ACUTE PAIN
TYPE: CHRONIC PAIN
TYPE: CHRONIC PAIN
TYPE: ACUTE PAIN
TYPE: SURGICAL PAIN
TYPE: ACUTE PAIN
TYPE: ACUTE PAIN
TYPE: CHRONIC PAIN;ACUTE PAIN
TYPE: ACUTE PAIN
TYPE: CHRONIC PAIN
TYPE: SURGICAL PAIN
TYPE: ACUTE PAIN
TYPE: SURGICAL PAIN
TYPE: ACUTE PAIN
TYPE: SURGICAL PAIN
TYPE: ACUTE PAIN
TYPE: SURGICAL PAIN
TYPE: ACUTE PAIN
TYPE: CHRONIC PAIN
TYPE: ACUTE PAIN;CHRONIC PAIN
TYPE: ACUTE PAIN
TYPE: ACUTE PAIN;CHRONIC PAIN
TYPE: CHRONIC PAIN
TYPE: ACUTE PAIN
TYPE: SURGICAL PAIN
TYPE: ACUTE PAIN
TYPE: SURGICAL PAIN
TYPE: ACUTE PAIN
TYPE: CHRONIC PAIN
TYPE: ACUTE PAIN
TYPE: SURGICAL PAIN
TYPE: ACUTE PAIN;CHRONIC PAIN
TYPE: ACUTE PAIN
TYPE: CHRONIC PAIN
TYPE: ACUTE PAIN;CHRONIC PAIN
TYPE: SURGICAL PAIN
TYPE: ACUTE PAIN
TYPE: CHRONIC PAIN
TYPE: CHRONIC PAIN
TYPE: ACUTE PAIN
TYPE: SURGICAL PAIN
TYPE: ACUTE PAIN
TYPE: SURGICAL PAIN
TYPE: CHRONIC PAIN
TYPE: ACUTE PAIN
TYPE: SURGICAL PAIN
TYPE: CHRONIC PAIN
TYPE: CHRONIC PAIN
TYPE: ACUTE PAIN
TYPE: ACUTE PAIN
TYPE: ACUTE PAIN;CHRONIC PAIN
TYPE: CHRONIC PAIN;ACUTE PAIN
TYPE: CHRONIC PAIN
TYPE: ACUTE PAIN

## 2022-01-01 ASSESSMENT — ACTIVITIES OF DAILY LIVING (ADL)
BATHING_REQUIRES_ASSISTANCE: 1
PHYSICAL_TRANSFER_REQUIRES_ASSISTANCE: 1
BATHING_REQUIRES_ASSISTANCE: 1
AMBULATION_REQUIRES_ASSISTANCE: 1
MONEY MANAGEMENT (EXPENSES/BILLS): TOTALLY DEPENDENT
CONTINENCE_REQUIRES_ASSISTANCE: 1
AMBULATION_REQUIRES_ASSISTANCE: 1
DRESSING_REQUIRES_ASSISTANCE: 1
AMBULATION_REQUIRES_ASSISTANCE: 1
TOILETING: INDEPENDENT
CONTINENCE_REQUIRES_ASSISTANCE: 1
DRESSING_REQUIRES_ASSISTANCE: 1
DRESSING_REQUIRES_ASSISTANCE: 1
AMBULATION ASSISTANCE: ONE PERSON
PHYSICAL_TRANSFER_REQUIRES_ASSISTANCE: 1
BATHING_REQUIRES_ASSISTANCE: 1
EATING_REQUIRES_ASSISTANCE: 1
PHYSICAL_TRANSFER_REQUIRES_ASSISTANCE: 1
CONTINENCE_REQUIRES_ASSISTANCE: 1
CURRENT_FUNCTION: ONE PERSON

## 2022-01-01 ASSESSMENT — FIBROSIS 4 INDEX
FIB4 SCORE: 2.26
FIB4 SCORE: 4.44
FIB4 SCORE: 3.59
FIB4 SCORE: 1.45
FIB4 SCORE: 1.81
FIB4 SCORE: 2.8
FIB4 SCORE: 4.83
FIB4 SCORE: 1.41
FIB4 SCORE: 1.36
FIB4 SCORE: 1.51
FIB4 SCORE: 1.74
FIB4 SCORE: 4.44
FIB4 SCORE: 1.36
FIB4 SCORE: 8.67
FIB4 SCORE: 1.88
FIB4 SCORE: 2.3
FIB4 SCORE: 2.03
FIB4 SCORE: 1.13
FIB4 SCORE: 1.39
FIB4 SCORE: 1.58
FIB4 SCORE: 6.41
FIB4 SCORE: 1.55
FIB4 SCORE: 2.81
FIB4 SCORE: 10.11
FIB4 SCORE: 1.44
FIB4 SCORE: 3.59
FIB4 SCORE: 1.36
FIB4 SCORE: 1.58
FIB4 SCORE: 1.39
FIB4 SCORE: 1.39
FIB4 SCORE: 2.81
FIB4 SCORE: 2.14
FIB4 SCORE: 2.81
FIB4 SCORE: 2.14
FIB4 SCORE: 2.51
FIB4 SCORE: 4.92
FIB4 SCORE: 2.81
FIB4 SCORE: 1.41
FIB4 SCORE: 3.99
FIB4 SCORE: 6.41
FIB4 SCORE: 0.99
FIB4 SCORE: 4.44

## 2022-01-01 ASSESSMENT — PATIENT HEALTH QUESTIONNAIRE - PHQ9
2. FEELING DOWN, DEPRESSED, IRRITABLE, OR HOPELESS: NOT AT ALL
1. LITTLE INTEREST OR PLEASURE IN DOING THINGS: NOT AT ALL
SUM OF ALL RESPONSES TO PHQ9 QUESTIONS 1 AND 2: 0
1. LITTLE INTEREST OR PLEASURE IN DOING THINGS: NOT AT ALL
1. LITTLE INTEREST OR PLEASURE IN DOING THINGS: NOT AT ALL
CLINICAL INTERPRETATION OF PHQ2 SCORE: 0
1. LITTLE INTEREST OR PLEASURE IN DOING THINGS: NOT AT ALL
2. FEELING DOWN, DEPRESSED, IRRITABLE, OR HOPELESS: NOT AT ALL
SUM OF ALL RESPONSES TO PHQ9 QUESTIONS 1 AND 2: 0
2. FEELING DOWN, DEPRESSED, IRRITABLE, OR HOPELESS: NOT AT ALL
SUM OF ALL RESPONSES TO PHQ9 QUESTIONS 1 AND 2: 0
SUM OF ALL RESPONSES TO PHQ9 QUESTIONS 1 AND 2: 0
1. LITTLE INTEREST OR PLEASURE IN DOING THINGS: NOT AT ALL
SUM OF ALL RESPONSES TO PHQ9 QUESTIONS 1 AND 2: 0
2. FEELING DOWN, DEPRESSED, IRRITABLE, OR HOPELESS: NOT AT ALL
2. FEELING DOWN, DEPRESSED, IRRITABLE, OR HOPELESS: NOT AT ALL
SUM OF ALL RESPONSES TO PHQ9 QUESTIONS 1 AND 2: 0
1. LITTLE INTEREST OR PLEASURE IN DOING THINGS: NOT AT ALL
SUM OF ALL RESPONSES TO PHQ9 QUESTIONS 1 AND 2: 0
1. LITTLE INTEREST OR PLEASURE IN DOING THINGS: NOT AT ALL

## 2022-01-01 ASSESSMENT — LIFESTYLE VARIABLES
HAVE YOU EVER FELT YOU SHOULD CUT DOWN ON YOUR DRINKING: NO
TOTAL SCORE: 0
AVERAGE NUMBER OF DAYS PER WEEK YOU HAVE A DRINK CONTAINING ALCOHOL: 0
DO YOU DRINK ALCOHOL: NO
ON A TYPICAL DAY WHEN YOU DRINK ALCOHOL HOW MANY DRINKS DO YOU HAVE: 0
DOES PATIENT WANT TO STOP DRINKING: CANNOT ASSESS
TOTAL SCORE: 0
HOW MANY TIMES IN THE PAST YEAR HAVE YOU HAD 5 OR MORE DRINKS IN A DAY: 0
EVER HAD A DRINK FIRST THING IN THE MORNING TO STEADY YOUR NERVES TO GET RID OF A HANGOVER: NO
ALCOHOL_USE: NO
EVER FELT BAD OR GUILTY ABOUT YOUR DRINKING: NO
REASON UNABLE TO ASSESS: ILLNESS
TOTAL SCORE: 0
HAVE PEOPLE ANNOYED YOU BY CRITICIZING YOUR DRINKING: NO
CONSUMPTION TOTAL: NEGATIVE

## 2022-01-01 ASSESSMENT — PULMONARY FUNCTION TESTS
FVC: 0.8
FVC: 0.8

## 2022-01-01 ASSESSMENT — GAIT ASSESSMENTS
GAIT LEVEL OF ASSIST: UNABLE TO PARTICIPATE
DEVIATION: SHUFFLED GAIT;BRADYKINETIC
GAIT LEVEL OF ASSIST: MINIMAL ASSIST
DISTANCE (FEET): 4
GAIT LEVEL OF ASSIST: UNABLE TO PARTICIPATE
DEVIATION: ATAXIC
GAIT LEVEL OF ASSIST: UNABLE TO PARTICIPATE
GAIT LEVEL OF ASSIST: MINIMAL ASSIST
ASSISTIVE DEVICE: HAND HELD ASSIST
DISTANCE (FEET): 2
GAIT LEVEL OF ASSIST: UNABLE TO PARTICIPATE
GAIT LEVEL OF ASSIST: UNABLE TO PARTICIPATE
ASSISTIVE DEVICE: HAND HELD ASSIST
GAIT LEVEL OF ASSIST: MINIMAL ASSIST
DISTANCE (FEET): 6
GAIT LEVEL OF ASSIST: UNABLE TO PARTICIPATE

## 2022-01-01 ASSESSMENT — PAIN SCALES - PAIN ASSESSMENT IN ADVANCED DEMENTIA (PAINAD)
CONSOLABILITY: 0 - NO NEED TO CONSOLE.
FACIALEXPRESSION: 1 - SAD. FRIGHTENED. FROWN.
BODYLANGUAGE: 0 - RELAXED.
TOTALSCORE: 0
FACIALEXPRESSION: 0 - SMILING OR INEXPRESSIVE.
TOTALSCORE: 0
TOTALSCORE: 8
FACIALEXPRESSION: 0 - SMILING OR INEXPRESSIVE.
CONSOLABILITY: 0 - NO NEED TO CONSOLE.
NEGVOCALIZATION: 0 - NONE.
CONSOLABILITY: 0 - NO NEED TO CONSOLE.
BODYLANGUAGE: 0 - RELAXED.
BODYLANGUAGE: 0 - RELAXED.
BODYLANGUAGE: 1 - TENSE. DISTRESSED PACING. FIDGETING.
FACIALEXPRESSION: 0 - SMILING OR INEXPRESSIVE.
NEGVOCALIZATION: 0 - NONE.
FACIALEXPRESSION: 2 - FACIAL GRIMACING.
CONSOLABILITY: 1 - DISTRACTED OR REASSURED BY VOICE OR TOUCH.
BODYLANGUAGE: 2 - RIGID. FISTS CLENCHED. KNEES PULLED UP. PULLING OR PUSHING AWAY. STRIKING OUT.
CONSOLABILITY: 1 - DISTRACTED OR REASSURED BY VOICE OR TOUCH.
NEGVOCALIZATION: 2 - REPEATED TROUBLE CALLING OUT. LOUD MOANING OR GROANING. CRYING.
NEGVOCALIZATION: 0 - NONE.
TOTALSCORE: 0

## 2022-01-01 ASSESSMENT — PAIN SCALES - GENERAL
PAIN_LEVEL: 0
PAIN_LEVEL: 0
PAINLEVEL: 9=SEVERE PAIN
PAINLEVEL: 4=SLIGHT-MODERATE PAIN
PAINLEVEL: 8=MODERATE-SEVERE PAIN
PAINLEVEL: 7=MODERATE-SEVERE PAIN
PAINLEVEL: 8=MODERATE-SEVERE PAIN
PAINLEVEL: 8=MODERATE-SEVERE PAIN
PAINLEVEL: 7=MODERATE-SEVERE PAIN
PAINLEVEL: 8=MODERATE-SEVERE PAIN

## 2022-01-01 ASSESSMENT — COPD QUESTIONNAIRES
HAVE YOU SMOKED AT LEAST 100 CIGARETTES IN YOUR ENTIRE LIFE: NO/DON'T KNOW
COPD SCREENING SCORE: 2
DURING THE PAST 4 WEEKS HOW MUCH DID YOU FEEL SHORT OF BREATH: NONE/LITTLE OF THE TIME
DO YOU EVER COUGH UP ANY MUCUS OR PHLEGM?: NO/ONLY WITH OCCASIONAL COLDS OR INFECTIONS

## 2022-01-01 ASSESSMENT — PAIN SCALES - WONG BAKER
WONGBAKER_NUMERICALRESPONSE: HURTS EVEN MORE
WONGBAKER_NUMERICALRESPONSE: HURTS EVEN MORE

## 2022-01-01 ASSESSMENT — PAIN DESCRIPTION - DESCRIPTORS: DESCRIPTORS: ACHING

## 2022-01-05 NOTE — ED NOTES
"ADMIT INPT 1-4-22  UR  [phone]    MEDICARE IS PRIMARY    Misa Martin (45 y.o. Female)             Date of Birth Social Security Number Address Home Phone MRN    1976  [id number] [id number] [phone] [id number]    Faith Marital Status             [facility] Single       Admission Date Admission Type Admitting Provider Attending Provider Department, Room/Bed    1/4/22 Emergency Berhane Mendoza MD Martin, [place] Allen, MD [facility] 3A, 344/1    Discharge Date Discharge Disposition Discharge Destination                         Attending Provider: Berhane Mendoza MD    Allergies: Cefaclor, Cephalexin, Penicillins, Sulfa Antibiotics    Isolation: None   Infection: None   Code Status: Prior   Advance Care Planning Activity    Ht: 170.7 cm (67.2\")   Wt: 51.2 kg (112 lb 14 oz)    Admission Cmt: None   Principal Problem: None                Active Insurance as of 1/4/2022     Primary Coverage     Payor Plan Insurance Group Employer/Plan Group    MEDICARE MEDICARE A & B      Payor Plan Address Payor Plan Phone Number Payor Plan Fax Number Effective Dates    [address] [phone]  9/1/2010 - None Entered    [facility] [id number]       Subscriber Name Subscriber Birth Date Member ID       MISA MARTIN 1976 [id number]           Secondary Coverage     Payor Plan Insurance Group Employer/Plan Group    [employer] [place] WE[place]ID      Payor Plan Address Payor Plan Phone Number Payor Plan Fax Number Effective Dates    [address] [address] [phone]  1/29/2018 - None Entered    [facility] [id number]       Subscriber Name Subscriber Birth Date Member ID       MISA MARTIN 1976 [id number]                 Emergency Contacts      (Rel.) Home Phone Work Phone Mobile Phone    Kayla Key (Mother) [phone] -- [phone]            Insurance Information                MEDICARE/MEDICARE A & B Phone: [phone]    Subscriber: Misa Martin " Pt requested bus pass, Pt given bus pass, Pt assisted with belongings and given box meal to go. Pt thankful and appreciative of care, reports motivations for getting treatment. Pt ambulated well and was escorted out of ED in stable condition.     Subscriber#: [id number]    Group#: -- Precert#: --        [facility]/WELLCARE MEDICAID Phone: [phone]    Subscriber: Misa Martin Subscriber#: [id number]    Group#: -- Precert#: --

## 2022-01-19 NOTE — ED TRIAGE NOTES
"Pt brought in by ems. Pt was in casino and security called the police. Pt is highly intoxicated. Pt reports that she fell and \"scraped my butt.\"  " [facility] [place], [address]   [address] [facility], [address]   [phone]                                              Cardiology Office Follow up  Heidi Zhu, 49 y o  female  YOB: 1965  MRN: [id number] Encounter: [id number]      PCP - Amira Ferrell, DO    Assessment and Plan  1  Non-ischemic cardiomyopathy, LVEF 40-45%  · Echo - 1/19/22 - DM 1%, grade 1 DD  · Echo - 9/12/19 ([place] report)- LVEF 40-44%, mildly dilated LV, mild diffuse hypokinesis, grade 1 diastolic dysfunction no significant valvular abnormalities  · C - 5/2018 - mid LAD 40%, otherwise mild atherosclerosis  2  Chronic LBBB  3  Non-obstructive CAD  · mLAD 40% in 2018  4  GERD  5  Diabetes Mellitus Type 2   · Has neuropathy, and possibly autonomic neuropathy as well with some orthostatic hypotension as well  6  Hyperlipidemia  7  Nocturnal hypoxia, on overnight O2 therapy  8  Active smoker  · 1 PPD  · Previously quit with chantix, but resumed smoking due to her son's smoking, and people around her smoking  5  Depression/PTSD  6  Overweight, Body mass index is 29 48 kg/m²       Plan  Chest /abdominal pain  · She reports ongoing symptoms of epigastric discomfort with radiation up to the neck as well as on both sides of her lower chest  · Non-exertional  · Epigastric tenderness+  · Did not respond to mylanta, but somewhat better with PPI  · Overall symptoms appear to be consistent with PUD/GERD  · ECG - 12/25/21 - NSR, LBBB (known)  · Counseled regarding need to minimize NSAIDs, quit smoking  · Follow-up with GI    · she already has follow-up with scheduled with GI Dr Hull for tomorrow    Non-ischemic cardiomyopathy, LVEF 40%  · Repeat echo earlier today was unchanged at 45%  · Continue Bisoprolol 5 mg daily  · Not on ACE/ARB due to low BP  · Previously had passed out with same    CAD, Non-obstructive  · no anginal symptoms, no previous stenting  · Counseled to quit smoking, but continues to smoke  · Continue rosuvastatin 20 mg    Hypertension  · BP continues to be on lower side at 100/70  · Continue bisoprolol 5 mg    Hyperlipidemia  · Lipid panel - 7/15/20 - , , HDL 40,    · No recent cholesterol checks available since then  · Continue rosuvastatin 20 mg       No results found for this visit on 01/19/22  No orders of the defined types were placed in this encounter  No follow-ups on file  History of Present Illness   49 y o  female comes in for annual cardiac follow-up, after being sent back by her pulmonologist    She used to see Dr Timi Winter last year, but is now being established with me  She has a history of a left bundle branch block,  And had an echo and a nuclear stress test done in May 2018, which revealed mildly reduced LVEF along with  A medium sized anterior defect with some reversibility  As a result she was referred for a cardiac catheterization which showed nonobstructive coronary artery disease with 40% stenosis of mid LAD  She was managed medically and has had no symptoms over the past year  She has been doing fairly well over the past year, and does not have any active complaints  She additionally follows with a pulmonologist at [address] [place] [address], who had ordered an echocardiogram last month  This revealed an LVEF of 40-44%, and as a result she was asked to follow up again with her cardiologist for further evaluation  Interval history - 8/20/2020  She comes back for follow-up  I previously saw her for a tele visit in March  She has been doing fairly well overall  Patient was unclear about her medications, but was eventually able to show me her my chart from [address] [place] [address] which showed a completely different list of medications  It appears she is not on bisoprolol, losartan or atorvastatin at present    She reports having symptoms of dizziness on standing up for a few seconds, and a couple of months ago, she was started on irbesartan 150, after which she reportedly passed out  As a result she stopped taking the same  Interval history - 11/23/2020  He comes back for follow-up after 3 months  She has been doing well overall and does not report any active complains of chest pain, shortness of breath, palpitations  She has not had any further episodes of dizziness or passing out since being on her current antihypertensive regimen  Interval history - 11/22/2021  She comes back for follow-up after 1 year  She has been doing well overall and continues to be free of chest pain  She does report mild a patent palpitations, but no persistent symptoms  No acute complains of shortness of breath, and overall doing well and remains active  No further complains of dizziness, near syncope or syncope  She does report some heartburn-like chest discomfort earlier this year, but states that she has run out of her PPI  Interval history - 1/19/2022  She comes back for follow-up for an earlier visit within 2 months  She has been having ongoing complains of epigastric discomfort with radiation the center of her chest   It also radiates on both sides on the lower side of chest   Distance to occurred at rest and is not worsened with exertion  She has been taking mylanta, without much help, but PPIs did provide some relief  No current complains of shortness of breath, palpitations, dizziness  She continues to have musculoskeletal and neuropathic pain, for which she takes multiple pain medications including diclofenac, ibuprofen and naproxen          Historical Information   Past Medical History:   Diagnosis Date    Angina pectoris ([facility] )     recent    Anxiety     Arthritis     Carpal tunnel syndrome     Chronic headaches     [facility]     Diabetes mellitus ([facility] )     GERD (gastroesophageal reflux disease)     Headache     Hyperlipidemia     Hypertension     Kidney stone     Left bundle branch block     LBBB    [facility]     Neuropathy     PTSD (post-traumatic stress disorder)     RLS (restless legs syndrome)     Shortness of breath      Past Surgical History:   Procedure Laterality Date    BREAST SURGERY  2016    Reduction    CARDIAC CATHETERIZATION  2018     Mid LAD: There was a tubular 40 % stenosis     SECTION      COLONOSCOPY      HYSTERECTOMY  2018    Complete    KIDNEY STONE SURGERY      LAPAROSCOPY      OK COLONOSCOPY FLX DX W/COLLJ SPEC WHEN PFRMD N/A 2017    Procedure: EGD AND COLONOSCOPY;  Surgeon: Paradise Molina MD;  Location: [facility]; Service: Gastroenterology    OK ESOPHAGOGASTRODUODENOSCOPY TRANSORAL DIAGNOSTIC N/A 10/10/2018    Procedure: ESOPHAGOGASTRODUODENOSCOPY (EGD); Surgeon: Paradise Molina MD;  Location: [facility];   Service: Gastroenterology    OK LAPAROSCOPY W TOT HYSTERECTUTERUS <=250 [address] TUBE/OVARY N/A 2018    Procedure: ROBOTIC ASSISTED TOTAL LAPAROSCOPIC HYSTERECTOMY, BILATERAL SALPINGOOPHERECTOMY,;  Surgeon: Vicente Lopez MD;  Location:  MAIN OR;  Service: Gynecology Oncology    UPPER GASTROINTESTINAL ENDOSCOPY      WRIST SURGERY Right      Family History   Problem Relation Age of Onset    Diabetes Mother    Candelario Daigle Breast cancer Mother 39    Lung cancer Father 47    Diabetes Sister     Brain cancer Maternal Aunt 67    Breast cancer Other 25     Current Outpatient Medications on File Prior to Visit   Medication Sig Dispense Refill    albuterol (PROVENTIL HFA,VENTOLIN HFA) 90 mcg/act inhaler Inhale 2 puffs every 6 (six) hours as needed for wheezing      buPROPion (WELLBUTRIN XL) 300 mg 24 hr tablet Take 300 mg by mouth every morning        diclofenac (VOLTAREN) 75 mg EC tablet Take 75 mg by mouth 2 (two) times a day        ibuprofen (MOTRIN) 200 mg tablet Take 200 mg by mouth every 6 (six) hours as needed for mild pain      insulin glargine (Basaglar KwikPen) 100 units/mL injection pen Inject 26 Units under the skin daily at bedtime      insulin lispro (HumaLOG) 100 units/mL injection Inject 10 Units under the skin 3 (three) times a day before meals      naproxen (NAPROSYN) 375 mg tablet Take 1 tablet (375 mg total) by mouth 2 (two) times a day with meals 20 tablet 0    NovoLOG FlexPen 100 units/mL injection pen       oxyCODONE (ROXICODONE) 5 immediate release tablet Take 5 mg by mouth 3 (three) times a day as needed      pantoprazole (PROTONIX) 40 mg tablet Take 1 tablet (40 mg total) by mouth daily as needed for heartburn 30 tablet 2    pregabalin (LYRICA) 75 mg capsule Take 75 mg by mouth 2 (two) times a day        rosuvastatin (CRESTOR) 20 MG tablet Take 20 mg by mouth daily        Spiriva Respimat 2 5 MCG/ACT AERS inhaler       zolpidem (AMBIEN) 10 mg tablet Take 10 mg by mouth daily at bedtime as needed for sleep      [DISCONTINUED] bisoprolol (ZEBETA) 5 mg tablet Take 1 tablet (5 mg total) by mouth daily 90 tablet 3    ARIPiprazole (ABILIFY) 2 mg tablet Take 2 mg by mouth daily (Patient not taking: Reported on 1/19/2022 )      linaCLOtide (LINZESS) 72 MCG CAPS Take 72 mcg by mouth daily (Patient not taking: Reported on 11/22/2021 ) 30 capsule 3    Liraglutide 18 MG/3ML SOPN Inject under the skin (Patient not taking: Reported on 11/22/2021 )      methocarbamol (ROBAXIN) 750 mg tablet Take 500 mg by mouth every 6 (six) hours as needed for muscle spasms (Patient not taking: Reported on 1/19/2022 )      metoclopramide (REGLAN) 5 mg/5 mL oral solution Take 5 mL (5 mg total) by mouth 3 (three) times a day (Patient not taking: Reported on 1/19/2022 ) 445 mL 1    nystatin (MYCOSTATIN) ointment Apply topically 2 (two) times a day (Patient not taking: Reported on 11/22/2021 ) 30 g 0    ondansetron (ZOFRAN) 4 mg tablet Take 1 tablet (4 mg total) by mouth every 8 (eight) hours as needed for nausea or vomiting (Patient not taking: Reported on 1/19/2022 ) 20 tablet 0    potassium chloride SA (KLOR-CON M15) 15 MEQ tablet Take 15 mEq by mouth 2 (two) times a day (Patient not taking: Reported on 1/19/2022 )      QUEtiapine (SEROquel) 200 mg tablet Take 150 mg by mouth daily at bedtime  (Patient not taking: Reported on 11/22/2021 )      repaglinide (PRANDIN) 1 mg tablet Take 1 mg by mouth 3 (three) times a day before meals (Patient not taking: Reported on 11/22/2021 )      vortioxetine (TRINTELLIX) 10 MG tablet Take 10 mg by mouth daily (Patient not taking: Reported on 11/22/2021 )      [DISCONTINUED] ALPRAZolam (XANAX) 1 mg tablet Take by mouth daily at bedtime as needed for anxiety (Patient not taking: Reported on 11/22/2021 )      [DISCONTINUED] benzonatate (TESSALON PERLES) 100 mg capsule TAKE 1 CAPSULE BY MOUTH 3 TIMES A DAY AS NEEDED FOR COUGH (Patient not taking: Reported on 11/22/2021)      [DISCONTINUED] dicyclomine (BENTYL) 20 mg tablet Take 1 tablet (20 mg total) by mouth every 6 (six) hours as needed (abdominal pain) (Patient not taking: Reported on 1/19/2022 ) 50 tablet 0    [DISCONTINUED] DULoxetine (CYMBALTA) 60 mg delayed release capsule Take 20 mg by mouth daily (Patient not taking: Reported on 1/19/2022 )      [DISCONTINUED] gabapentin (NEURONTIN) 300 mg capsule Take 400 mg by mouth 2 (two) times a day   (Patient not taking: Reported on 11/22/2021 )      [DISCONTINUED] Mirabegron ER 25 MG TB24 Take by mouth (Patient not taking: Reported on 11/22/2021 )      [DISCONTINUED] montelukast (SINGULAIR) 10 mg tablet Take 10 mg by mouth daily (Patient not taking: Reported on 11/22/2021 )  2    [DISCONTINUED] prazosin (MINIPRESS) 1 mg capsule  (Patient not taking: Reported on 1/19/2022 )      [DISCONTINUED] valsartan (DIOVAN) 80 mg tablet Take 80 mg by mouth daily (Patient not taking: Reported on 11/22/2021 )       No current facility-administered medications on file prior to visit       Allergies   Allergen Reactions    Lisinopril Cough    Losartan Dizziness    Other Hives     Surgical tape    Prednisone Other (See Comments)     Thrush       Social History     Socioeconomic History    Marital status: /Civil Union     Spouse name: None    Number of children: None    Years of education: None    Highest education level: None   Occupational History    Occupation: Healthcare provider    Tobacco Use    Smoking status: Current Every Day Smoker     Packs/day: 1 00     Years: 44 00     Pack years: 44 00    Smokeless tobacco: Never Used    Tobacco comment: pt  smoked this am 10/10   Vaping Use    Vaping Use: Never used   Substance and Sexual Activity    Alcohol use: No     Comment: Rarely consumes alcohol - As per [employer]     Drug use: Yes     Frequency: 7 0 times per week     Types: Marijuana     Comment: medical marijuana    Sexual activity: None   Other Topics Concern    None   Social History Narrative    None     Social Determinants of Health     Financial Resource Strain: Not on file   Food Insecurity: Not on file   Transportation Needs: Not on file   Physical Activity: Not on file   Stress: Not on file   Social Connections: Not on file   Intimate Partner Violence: Not on file   Housing Stability: Not on file        Review of Systems   All other systems reviewed and are negative  Vitals:  Vitals:    01/19/22 1411   BP: 100/70   Pulse: 70   Weight: 70 8 kg (156 lb)   Height: 5' 1" (1 549 m)     BMI - Body mass index is 29 48 kg/m²  Wt Readings from Last 7 Encounters:   01/19/22 70 8 kg (156 lb)   01/19/22 70 3 kg (155 lb)   12/25/21 70 3 kg (155 lb)   11/22/21 69 9 kg (154 lb)   06/03/21 79 9 kg (176 lb 2 4 oz)   03/16/21 78 9 kg (174 lb)   11/23/20 75 8 kg (167 lb)       Physical Exam  Vitals and nursing note reviewed  Constitutional:       General: She is not in acute distress  Appearance: Normal appearance  She is well-developed  She is not ill-appearing  HENT:      Head: Normocephalic and atraumatic  Nose: No congestion  Eyes:      General: No scleral icterus       Conjunctiva/sclera: Conjunctivae normal    Neck:      Vascular: No carotid bruit or JVD  Cardiovascular:      Rate and Rhythm: Normal rate and regular rhythm  Pulses: Normal pulses  Heart sounds: Normal heart sounds  No murmur heard  No friction rub  No gallop  Pulmonary:      Effort: Pulmonary effort is normal  No respiratory distress  Breath sounds: Normal breath sounds  No rales  Abdominal:      General: There is no distension  Palpations: Abdomen is soft  Tenderness: There is no abdominal tenderness  Musculoskeletal:         General: No swelling or tenderness  Cervical back: Neck supple  Right lower leg: No edema  Left lower leg: No edema  Skin:     General: Skin is warm  Neurological:      General: No focal deficit present  Mental Status: She is alert and oriented to person, place, and time  Mental status is at baseline  Psychiatric:         Mood and Affect: Mood normal          Behavior: Behavior normal          Thought Content:  Thought content normal        Labs:  CBC:   Lab Results   Component Value Date    WBC 12 16 (H) 12/25/2021    RBC 6 12 (H) 12/25/2021    HGB 16 8 (H) 12/25/2021    HCT 50 0 (H) 12/25/2021    MCV 82 12/25/2021     12/25/2021    RDW 13 1 12/25/2021       CMP:   Lab Results   Component Value Date    K 3 7 12/25/2021    CL 96 12/25/2021    CO2 29 12/25/2021    BUN 16 12/25/2021    CREATININE 0 85 12/25/2021    EGFR 77 12/25/2021    CALCIUM 9 9 12/25/2021    AST 18 06/03/2021    ALT 40 06/03/2021    ALKPHOS 141 (H) 06/03/2021       Magnesium:  Lab Results   Component Value Date    MG 1 9 10/18/2019       Lipid Profile:   No results found for: CHOL, HDL, TRIG, LDLCALC    Thyroid Studies: No results found for: VPF0MQYFODHH, T3FREE, FREET4, Z8ADXYC, G5OCKWL    No components found for: [employer] [place]    Lab Results   Component Value Date    INR 0 98 10/18/2019    INR 0 89 05/09/2018   5    Imaging: Ct Abdomen Pelvis With Contrast    Result Date: 10/18/2019  Narrative: CT ABDOMEN AND PELVIS WITH IV CONTRAST INDICATION:   lower abd pain, diarrhea  COMPARISON:  None  TECHNIQUE:  CT examination of the abdomen and pelvis was performed  Axial, sagittal, and coronal 2D reformatted images were created from the source data and submitted for interpretation  Radiation dose length product (DLP) for this visit:  500 mGy-cm   This examination, like all CT scans performed in the [facility], was performed utilizing techniques to minimize radiation dose exposure, including the use of iterative reconstruction and automated exposure control  IV Contrast:  100 mL of iohexol (OMNIPAQUE) Enteric Contrast:  Enteric contrast was not administered  FINDINGS: ABDOMEN LOWER CHEST:  No clinically significant abnormality identified in the visualized lower chest  LIVER/BILIARY TREE: The liver is mildly enlarged  Diffusely decreased hepatic attenuation suggesting steatosis  No intra or extrahepatic biliary ductal dilation GALLBLADDER:  No calcified gallstones  No pericholecystic inflammatory change  SPLEEN:  Unremarkable  PANCREAS:  Unremarkable  ADRENAL GLANDS:  Unremarkable  KIDNEYS/URETERS: No hydronephrosis  No suspicious renal lesion  Left renal lower pole focal cortical loss, noting a 3 mm dystrophic calcification  STOMACH AND BOWEL: Circumferential wall thickening of the mid to distal transverse colon sparing the splenic flexure with pericolonic fat stranding in keeping with acute colitis (series 2, images 31-41)  No disproportionate dilation of small or large bowel loops  APPENDIX:  No findings to suggest appendicitis  ABDOMINOPELVIC CAVITY:  No ascites or free intraperitoneal air  No lymphadenopathy  VESSELS:  Unremarkable for patient's age  PELVIS REPRODUCTIVE ORGANS:  Status post hysterectomy  No adnexal mass  URINARY BLADDER:  Unremarkable  ABDOMINAL WALL/INGUINAL REGIONS:  Unremarkable   OSSEOUS STRUCTURES:  No acute fracture or destructive osseous lesion  Impression: 1  Acute uncomplicated colitis of of the mid to distal transverse colon sparing the splenic flexure, favored to be infectious  2   Mild hepatomegaly and hepatic steatosis  Workstation performed: [id number]       Cardiac testing:   Results for orders placed during the hospital encounter of 18   Echo complete with contrast if indicated    Narrative [address], [address] [facility]  [phone]    Transthoracic Echocardiogram  2D, M-mode, Doppler, and Color Doppler    Study date:  03-May-2018    Patient: Kacy Baker  MR number: [id number]  Account number: [de-identified]  : 1965  Age: 50 years  Gender: Female  Status: Outpatient  Location: [facility]  Height: 61 in  Weight: 170 lb  BP: 112/ 78 mmHg    Indications: Dyspnea  Diagnoses: R06 09 - Other forms of dyspnea    Sonographer:  Charles RCS  Interpreting Physician:  Pinky Knox MD  Primary Physician:  Ivan Shaffer DO  Referring Physician:  Pinky Knox MD  Group:  [facility]    SUMMARY    LEFT VENTRICLE:  Ejection fraction was estimated to be 40 %  Dyssynchronous septal motion likely secondary to LBBB  There was mild diffuse hypokinesis  MITRAL VALVE:  There was trace regurgitation  HISTORY: PRIOR HISTORY: LBBB Myocardial infarction  Risk factors: hypertension, oral hypoglycemic-treated diabetes, a history of current cigarette use (within the last month), and a family history of coronary artery disease  PROCEDURE: The study was performed in the [address] [place] [address]  This was a routine study  The transthoracic approach was used  The study included complete 2D imaging, M-mode, complete spectral Doppler, and color Doppler  The  heart rate was 82 bpm, at the start of the study  Image quality was adequate  LEFT VENTRICLE: Size was normal  Ejection fraction was estimated to be 40 %   Dyssynchronous septal motion likely secondary to LBBB  There was mild diffuse hypokinesis  DOPPLER: There was an increased relative contribution of atrial  contraction to ventricular filling  RIGHT VENTRICLE: The size was normal  Systolic function was normal  Wall thickness was normal     LEFT ATRIUM: Size was normal     RIGHT ATRIUM: Size was normal     MITRAL VALVE: Valve structure was normal  There was normal leaflet separation  DOPPLER: The transmitral velocity was within the normal range  There was no evidence for stenosis  There was trace regurgitation  AORTIC VALVE: The valve was not well visualized  DOPPLER: There was no evidence for stenosis  TRICUSPID VALVE: The valve structure was normal  There was normal leaflet separation  DOPPLER: The transtricuspid velocity was within the normal range  There was no evidence for stenosis  There was no regurgitation  PULMONIC VALVE: Not well visualized  PERICARDIUM: There was no pericardial effusion  The pericardium was normal in appearance  AORTA: The root exhibited normal size  SYSTEM MEASUREMENT TABLES    Apical four chamber  4 chamber Left Atrium Volume Index; Planimetry; End Systole; Apical four chamber;: 9 19 cm2  Left Ventricular End Diastolic Volume; Method of Disks, Single Plane; Apical four chamber;: 71 9 ml  Left Ventricular End Systolic Volume; Method of Disks, Single Plane; Apical four chamber;: 50 6 ml  Right Atrium Systolic Area; Planimetry; End Systole; Apical four chamber;: 8 11 cm2  Right Ventricular Internal Diastolic Dimension; End Diastole; Apical four chamber;: 20 6 mm  TAPSE: 27 4 mm    Apical two chamber  Left Ventricular End Diastolic Volume; Method of Disks, Single Plane; Apical two chamber;: 105 3 ml  Left Ventricular End Systolic Volume; Method of Disks, Single Plane; Apical two chamber;: 35 7 ml    Unspecified Scan Mode  Aortic Root Diameter; End Systole;: 29 4 mm  Left atrial diameter; End Diastole;: 29 7 mm  Cardiac Output;  Teichholz; Systole;: 5 91 L/min  Interventricular Septum Diastolic Thickness; Teichholz; End Diastole;: 8 5 mm  Left Ventricle Internal End Diastolic Dimension; Teichholz;: 51 8 mm  Left Ventricle Internal Systolic Dimension; Teichholz; End Systole;: 37 7 mm  Left Ventricle Posterior Wall Diastolic Thickness; Teichholz; End Diastole;: 8 mm  Left Ventricular Ejection Fraction; Teichholz;: 52 2 %  Left Ventricular End Diastolic Volume; Teichholz;: 127 2 ml  Left Ventricular End Systolic Volume; Teichholz;: 60 8 ml  Stroke volume; Teichholz; Systole;: 66 4 ml  Mitral Valve Area; Area by Pressure Half-Time; Systole;: 3 49 cm2  Mitral Valve E to A Ratio; Systole;: 0 69    [facility] Accredited [facility]    Prepared and electronically signed by    Matias Lazcano MD  Signed [id number] 11:54:33       No results found for this or any previous visit  No results found for this or any previous visit  No results found for this or any previous visit

## 2022-01-21 NOTE — PROGRESS NOTES
IR NOTE: Pt transported to PACU via gurney with Chest Tube in place.  Chest Tube visualized with Rosie IRENE.  Pt remain on oxygen at handoff. Pt c/o pain, reported to Rosie IRENE.

## 2022-01-21 NOTE — PROGRESS NOTES
IR NOTE:     0758 - Contacted lab regarding pt/inr results.  Per Kassi verbal result PT:13.1 and INR: 1.02. Results not released to Epic at this time.    0820 - Dr. Ricci at bedside in pre-op discussing procedure with pt.  Brissa RN at bedside.      0842 - Brissa  RN arrived in Pre-OP for pt hand off. Radhames MCCARTHY consented pt.  Pt name/, procedure and consent/H&P verified in pre-op with pt, Stefan RN, Brissa RN prior to transport to IR.     0849 - Pt transported to IR from Pre-Op via gurney without incident.  Pt presents to IR for left lung biopsy with moderate sedation performed by Dr. Ricci.  Pt gcs 15, in nad.   Pt transferred self to CT table without incident to a supine position. Comfort and Safety measures in place.  Pt placed on cardiac and respiratory monitoring.  Pt prepped and draped in sterile fashion.     0909 - Timeout completed, all team members agree.  Procedure begins    Puncture to left upper chest.  Specimens obtained by Dr. Ricci.  2 core left lung nodule in formalin.      15 - Left chest tube placed by Dr. Ricci, left mid upper chest.   iCabbi 8F x 25 cm  Flexima Locking Pigtail  REF: [id number]  LOT: [id number]  EXP: 24 procedure end.

## 2022-01-21 NOTE — OR NURSING
"1344: Report received from TETO Garcia. In process of arranging home pain medication prior to discharge and monitoring oxygen saturations without oxygen. Currently 93% on room air.    1350: Spoke with Dr. Ricci, he will place an order to [facility] on [address] for home pain medication.    1355: Patient and spouse updated regarding POC.    1400: Order placed to [facility], patient disagreeable. Patient states, \"I cannot  my medication from the [facility].\" This RN attempted to page Dr. Ricci for change of pharmacy to [facility] [address] Dr. Ricci in procedure.    1400: This RN called [facility] to confirm business hours and availability for patient . Patient reassured that pharmacy is open until 6pm.    1420: Film room notified that there is no longer a need to change the location of the prescription. Dr. Ricci to disregard.    1430: patient voids.    1440: Pt discharged home. Discharge instructions given to pt prior to leaving unit including when to see PCP, and new medications if applicable. PIV removed. All questions answered. Verbalized understanding. All belongings with pt when leaving unit via wheelchair with RN.  "

## 2022-01-21 NOTE — OR SURGEON
Immediate Post- Operative Note        Findings: NOREEN Lung Mass      Procedure(s): CT Biopsy      Estimated Blood Loss: Less than 5 ml    Complications: None      1/21/2022     9:21 AM     Stefan Ricci M.D.

## 2022-01-21 NOTE — H&P
"History and Physical    Date: 1/21/2022    PCP: Pcp Pt States None      CC: Left Upper Lobe Lung Nodule  HPI: This is a 61 y.o. female who is presenting for CT Biopsy    Past Medical History:   Diagnosis Date   • Alcoholism (HCC)    • Anemia     pt states she doesn't think it is a current issue   • Anxiety    • Anxiety and depression 03/23/2021   • Arthritis     osteo-legs, knees   • ASTHMA     Inhaler as needed.    • Breath shortness     On exertion.    • Bronchitis 01/18/2022    In the past   • Cancer (HCC) 1981    cervical   • Chronic low back pain    • Congestive heart failure (HCC)     1/18/22:  pt states she is not sure.    • Dental disorder     upper and lower dentures. 1/18/22: Pt. denies   • Depression    • EtOH dependence (HCC)    • Fall     alcohol related   • GERD (gastroesophageal reflux disease)    • Heart burn    • HTN    • Hypertension    • Indigestion     GERD   • Muscle disorder    • OSTEOPOROSIS    • Other specified symptom associated with female genital organs     \"I have fibroids bad\"\"   • Pain 03/23/2021    breast, legs, joints   • Pneumonia 01/18/2022    In the past   • Psychiatric disorder    • PTSD (post-traumatic stress disorder)    • Renal disorder     Hx of stones   • Renal failure Around 2019    One time related to heeavily drinking per pt.    • Seizure ([facility])     several years ago r/t alcohol withdrawl   • Seizure (HCC) 11/03/2020    last seizure was 11/2020   • Ulcer    • Vitamin D deficiency        Past Surgical History:   Procedure Laterality Date   • PB MASTECTOMY, PARTIAL Right 3/26/2021    Procedure: MASTECTOMY, PARTIAL - ESTEBAN LOCALIZED;  Surgeon: Janice Knowles M.D.;  Location: SURGERY SAME DAY [facility];  Service: General   • AXILLARY NODE DISSECTION Right 3/26/2021    Procedure: LYMPHADENECTOMY, AXILLARY.;  Surgeon: Janice Knowles M.D.;  Location: SURGERY SAME DAY [facility];  Service: General   • GASTROSCOPY-ENDO N/A 10/3/2018    Procedure: GASTROSCOPY-ENDO;  Surgeon: Ben" KATIE Negro;  Location: ENDOSCOPY [facility];  Service: Gastroenterology   • CYSTOSCOPY STENT PLACEMENT  11/9/2010    Performed by ANGEL HARRIS at SURGERY [facility]   • URETEROSCOPY  11/9/2010    Performed by ANGEL HARRIS at SURGERY [facility]   • LASERTRIPSY  11/9/2010    Performed by ANGEL HARRIS at SURGERY [facility]   • STENT REMOVAL  11/9/2010    Performed by ANGEL HARRIS at SURGERY [facility]   • CYSTO STENT PLACEMNT PRE SURG  10/7/2010    Performed by ANGEL HARRIS at SURGERY [facility]   • OTHER Left 2010    Leg   • HERNIA REPAIR  1977       Current Facility-Administered Medications   Medication Dose Route Frequency Provider Last Rate Last Admin   • NS infusion   Intravenous Continuous John Allan M.D. 125 mL/hr at 01/21/22 0725 New Bag at 01/21/22 0725        Social History     Socioeconomic History   • Marital status:      Spouse name: Not on file   • Number of children: Not on file   • Years of education: Not on file   • Highest education level: Not on file   Occupational History   • Not on file   Tobacco Use   • Smoking status: Current Every Day Smoker     Packs/day: 0.25     Years: 20.00     Pack years: 5.00     Types: Cigarettes   • Smokeless tobacco: Former User     Types: Chew   • Tobacco comment: 1/2 pack per day   Vaping Use   • Vaping Use: Never used   Substance and Sexual Activity   • Alcohol use: Not Currently     Comment: vodka, heavy use-pt stated she is not drinking as of 3/23   • Drug use: Not Currently     Comment: In the past smoked pot   • Sexual activity: Never     Partners: Male   Other Topics Concern   • Not on file   Social History Narrative    ** Merged History Encounter **          Social Determinants of Health     Financial Resource Strain:    • Difficulty of Paying Living Expenses: Not on file   Food Insecurity:    • Worried About Running Out of Food in the Last Year: Not on file   • Ran Out of Food in the Last Year: Not  on file   Transportation Needs:    • Lack of Transportation (Medical): Not on file   • Lack of Transportation (Non-Medical): Not on file   Physical Activity:    • Days of Exercise per Week: Not on file   • Minutes of Exercise per Session: Not on file   Stress:    • Feeling of Stress : Not on file   Social Connections:    • Frequency of Communication with Friends and Family: Not on file   • Frequency of Social Gatherings with Friends and Family: Not on file   • Attends Protestant Services: Not on file   • Active Member of Clubs or Organizations: Not on file   • Attends Club or Organization Meetings: Not on file   • Marital Status: Not on file   Intimate Partner Violence:    • Fear of Current or Ex-Partner: Not on file   • Emotionally Abused: Not on file   • Physically Abused: Not on file   • Sexually Abused: Not on file   Housing Stability:    • Unable to Pay for Housing in the Last Year: Not on file   • Number of Places Lived in the Last Year: Not on file   • Unstable Housing in the Last Year: Not on file       Family History   Problem Relation Age of Onset   • Heart Disease Father    • Hypertension Father    • Diabetes Father    • Cancer Paternal Grandmother    • Depression Other    • Lung Disease Neg Hx    • Stroke Neg Hx        Allergies:  Sulfa drugs, Toradol, and Aspirin    Review of Systems:  Negative except for lung nodule    Physical Exam    Vital Signs  Blood Pressure: 129/77   Temperature: 36.2 °C (97.2 °F)   Pulse: 79   Respiration: 17   Pulse Oximetry: 100 %        Labs:  Recent Labs     01/21/22  0715   WBC 5.7   RBC 4.42   HEMOGLOBIN 13.7   HEMATOCRIT 41.7   MCV 94.3   MCH 31.0   MCHC 32.9*   RDW 46.1   PLATELETCT 260   MPV 10.0         Recent Labs     01/21/22  0715   INR 1.02     Recent Labs     01/21/22  0715   INR 1.02       Radiology:  CT-BIOPSY-LUNG/MEDIASTINUM W/GUIDE LEFT    (Results Pending)             Assessment and Plan:This is a 61 y.o. presents for CT BX NOREEN.

## 2022-01-21 NOTE — DISCHARGE INSTRUCTIONS
ACTIVITY: Rest and take it easy for the first 24 hours.  A responsible adult is recommended to remain with you during that time.  It is normal to feel sleepy.  We encourage you to not do anything that requires balance, judgment or coordination.    MILD FLU-LIKE SYMPTOMS ARE NORMAL. YOU MAY EXPERIENCE GENERALIZED MUSCLE ACHES, THROAT IRRITATION, HEADACHE AND/OR SOME NAUSEA.    FOR 24 HOURS DO NOT:  Drive, operate machinery or run household appliances.  Drink beer or alcoholic beverages.   Make important decisions or sign legal documents.    SPECIAL INSTRUCTIONS: Come to Radiology for Chest xray and to remove chest tube. Call [phone] with questions    Lung Biopsy, Care After  This sheet gives you information about how to care for yourself after your procedure. Your health care provider may also give you more specific instructions depending on the type of biopsy you had. If you have problems or questions, contact your health care provider.  What can I expect after the procedure?  After the procedure, it is common to have:  · A cough.  · A sore throat.  · Pain where a needle, bronchoscope, or incision was used to collect a biopsy sample (biopsy site).  Follow these instructions at home:  Medicines  · Take over-the-counter and prescription medicines only as told by your health care provider.  · Do not drink alcohol if your health care provider tells you not to drink.  · Ask your health care provider if the medicine prescribed to you:  ? Requires you to avoid driving or using heavy machinery.  ? Can cause constipation. You may need to take these actions to prevent or treat constipation:  § Drink enough fluid to keep your urine pale yellow.  § Take over-the-counter or prescription medicines.  § Eat foods that are high in fiber, such as beans, whole grains, and fresh fruits and vegetables.  § Limit foods that are high in fat and processed sugars, such as fried or sweet foods.  · Do not drive for 24 hours if you were  given a sedative.  Biopsy site care    · Follow instructions from your health care provider about how to take care of your biopsy site. Make sure you:  ? Wash your hands with soap and water before and after you change your bandage (dressing). If soap and water are not available, use hand .  ? Change your dressing as told by your health care provider.  ? Leave stitches (sutures), skin glue, or adhesive strips in place. These skin closures may need to stay in place for 2 weeks or longer. If adhesive strip edges start to loosen and curl up, you may trim the loose edges. Do not remove adhesive strips completely unless your health care provider tells you to do that.  · Do not take baths, swim, or use a hot tub until your health care provider approves. Ask your health care provider if you may take showers. You may only be allowed to take sponge baths.  · Check your biopsy site every day for signs of infection. Check for:  ? Redness, swelling, or more pain.  ? Fluid or blood.  ? Warmth.  ? Pus or a bad smell.  General instructions  · Return to your normal activities as told by your health care provider. Ask your health care provider what activities are safe for you.  · It is up to you to get the results of your procedure. Ask your health care provider, or the department that is doing the procedure, when your results will be ready.  · Keep all follow-up visits as told by your health care provider. This is important.  Contact a health care provider if:  · You have a fever.  · You have redness, swelling, or more pain around your biopsy site.  · You have fluid or blood coming from your biopsy site.  · Your biopsy site feels warm to the touch.  · You have pus or a bad smell coming from your biopsy site.  · You have pain that does not get better with medicine.  Get help right away if:  · You cough up blood.  · You have trouble breathing.  · You have chest pain.  · You lose consciousness.  Summary  · After the procedure,  it is common to have a sore throat and a cough.  · Return to your normal activities as told by your health care provider. Ask your health care provider what activities are safe for you.  · Take over-the-counter and prescription medicines only as told by your health care provider.  · Report any unusual symptoms to your health care provider.  This information is not intended to replace advice given to you by your health care provider. Make sure you discuss any questions you have with your health care provider.  Document Released: 01/16/2018 Document Revised: 01/22/2020 Document Reviewed: 01/16/2018  Elsevier Patient Education © 2020 [employer]    DIET: To avoid nausea, slowly advance diet as tolerated, avoiding spicy or greasy foods for the first day.  Add more substantial food to your diet according to your physician's instructions.  Babies can be fed formula or breast milk as soon as they are hungry.  INCREASE FLUIDS AND FIBER TO AVOID CONSTIPATION.    SURGICAL DRESSING/BATHING: okay to shower and remove dressing after 24 hours. Do not submerge surgical site for 7 days. Return for chest xray and chest tube removed.    FOLLOW-UP APPOINTMENT:  A follow-up appointment should be arranged with your doctor in 1-2 weeks; call to schedule.    You should CALL YOUR PHYSICIAN if you develop:  Fever greater than 101 degrees F.  Pain not relieved by medication, or persistent nausea or vomiting.  Excessive bleeding (blood soaking through dressing) or unexpected drainage from the wound.  Extreme redness or swelling around the incision site, drainage of pus or foul smelling drainage.  Inability to urinate or empty your bladder within 8 hours.  Problems with breathing or chest pain.    You should call 911 if you develop problems with breathing or chest pain.  If you are unable to contact your doctor or surgical center, you should go to the nearest emergency room or urgent care center.  Physician's telephone #: [phone]    If  any questions arise, call your doctor.  If your doctor is not available, please feel free to call the Surgical Center at [phone].     A registered nurse may call you a few days after your surgery to see how you are doing after your procedure.    MEDICATIONS: Resume taking daily medication.  Take prescribed pain medication with food.  If no medication is prescribed, you may take non-aspirin pain medication if needed.  PAIN MEDICATION CAN BE VERY CONSTIPATING.  Take a stool softener or laxative such as senokot, pericolace, or milk of magnesia if needed.    Prescription given for oxyCODONE.      If your physician has prescribed pain medication that includes Acetaminophen (Tylenol), do not take additional Acetaminophen (Tylenol) while taking the prescribed medication.    Depression / Suicide Risk    As you are discharged from this [facility] facility, it is important to learn how to keep safe from harming yourself.    Recognize the warning signs:  · Abrupt changes in personality, positive or negative- including increase in energy   · Giving away possessions  · Change in eating patterns- significant weight changes-  positive or negative  · Change in sleeping patterns- unable to sleep or sleeping all the time   · Unwillingness or inability to communicate  · Depression  · Unusual sadness, discouragement and loneliness  · Talk of wanting to die  · Neglect of personal appearance   · Rebelliousness- reckless behavior  · Withdrawal from people/activities they love  · Confusion- inability to concentrate     If you or a loved one observes any of these behaviors or has concerns about self-harm, here's what you can do:  · Talk about it- your feelings and reasons for harming yourself  · Remove any means that you might use to hurt yourself (examples: pills, rope, extension cords, firearm)  · Get professional help from the community (Mental Health, Substance Abuse, psychological counseling)  · Do not be alone:Call your Safe  Contact- someone whom you trust who will be there for you.  · Call your local CRISIS HOTLINE 692-1699 or 589-799-1958  · Call your local Children's Mobile Crisis Response Team [place] (186) 249-5866 or www.[employer] M[employer]  · Call the toll free National Suicide Prevention Hotlines   · National Suicide Prevention Lifeline 688-770-TTFT (6249)  · National MEDArchon Line Network 800-SUICIDE (915-7935)

## 2022-01-21 NOTE — PROGRESS NOTES
In prescribing controlled substances to this patient, I certify that I have obtained and reviewed the medical history of Ashleigh Marquis. I have also made a good doug effort to obtain applicable records from other providers who have treated the patient and records did not demonstrate any increased risk of substance abuse that would prevent me from prescribing controlled substances.     I have conducted a physical exam and documented it. I have reviewed Ms. Marquis’s prescription history as maintained by the [place] Prescription Monitoring Program.     I have assessed the patient’s risk for abuse, dependency, and addiction using the validated Opioid Risk Tool available at https://www.mdcalc.com/avlngn-gvum-kcgf-ort-narcotic-abuse.     Given the above, I believe the benefits of controlled substance therapy outweigh the risks. The reasons for prescribing controlled substances include non-narcotic, oral analgesic alternatives have been inadequate for pain control. Accordingly, I have discussed the risk and benefits, treatment plan, and alternative therapies with the patient.

## 2022-01-21 NOTE — OR NURSING
1300-pt ready for discharge, pt trequesting pain meds for h ome but Dr Ricci wants her to take Motrin at home for pain.

## 2022-01-21 NOTE — OR NURSING
1311 Report received from PACU Rn.      Vital signs stable. Pain level ***/10, no complaints of n/v.  Dressing to *** c/d/i.      *** Patient states ready to go home.  VSS, patient has all belongings. IV removed.  Patient got dressed with assistance.  Discharge instructions discussed with patient.  Questions answered. Patient and family verbalized understanding.  Rx electronically sent to patient's pharmacy and read for . Patient taken out with wheelchair and all belongings.

## 2022-01-24 NOTE — DISCHARGE PLANNING
KOLTON responded to code assist in [facility] [place].     KOLTON met with pt's significant other, Manohar Gipson's     Placed Bill in Family room     His contact phone number is [phone]

## 2022-01-24 NOTE — DISCHARGE PLANNING
JUDI met with Bill in family room to update that we would get him back to see pt as soon as her nurse is available (currently in an emergency). CM verified demographics.    CM will update BSN when able.

## 2022-01-24 NOTE — ED TRIAGE NOTES
Chief Complaint   Patient presents with   • Post Op Bleeding     Chest tube removed as outpatient, bleeding from site L upper chest     Patient code assist from [facility] lobby after having pigtail chest tube removed, onset bleeding, code assist called.     Pressure applied to L upper chest from removal site, bleeding controlled on arrival to ED.  Gauze and tegaderm placed.      ERP at bedside.

## 2022-01-24 NOTE — PROGRESS NOTES
Pre chest tube clamp DX results 1235 No evidence of pneumothorax with left pigtail chest tube in place    Clamp chest tube at 1245.     Instruct pt to call if dyspnea or chest pain while tube clamped. If pt develops dyspnea, chest pain, or O2 demands increase/ saturation decreases, immediately unclamp and replace to suction, THEN notify IR MD x [phone].    Pending Chest DX results following 1 hr trial clamping    Serial CXRs reviewed with Amanda MCCARTHY (IR, x [phone]). Pt denies chest pain or dyspnea. O2 sats 97 on RA. No radiographic evidence of Left PNTX with chest tube clamped. No air leak on exam. Chest tube removed without difficulty, occlusive dressing placed. Site care instructions reviewed. Pt understands to call 911 or go to emergency room if development of chest pain, bleeding or dyspnea s/p chest tube removal. From IR standpoint, pt may resume all activity.      Patient escorted out

## 2022-01-24 NOTE — ED PROVIDER NOTES
"ED Provider Note    CHIEF COMPLAINT  Chief Complaint   Patient presents with   • Post Op Bleeding     Chest tube removed as outpatient, bleeding from site L upper chest       HPI  Ashleigh Marquis is a 61 y.o. female who presents for evaluation of bleeding status post left chest tube removal.  The patient had a left-sided chest tube placed, pigtail by interventional radiology 3 days ago after a CT-guided biopsy inadvertently caused a small left-sided pneumothorax.  The patient came in today to have her chest tube removed.  It was apparently removed by the radiology nurse practitioner.  The patient did not have any bleeding and when she got up to ambulate she developed some brisk bleeding at the site of chest tube removal and was immediately brought to the emergency department.    REVIEW OF SYSTEMS  See HPI for further details.  No high fevers chills night sweats weight loss all other systems are negative.     PAST MEDICAL HISTORY  Past Medical History:   Diagnosis Date   • Bronchitis 01/18/2022    In the past   • Pneumonia 01/18/2022    In the past   • Anxiety and depression 03/23/2021   • Pain 03/23/2021    breast, legs, joints   • Seizure (HCC) 11/03/2020    last seizure was 11/2020   • Cancer ([facility]) 1981    cervical   • Alcoholism ([facility])    • Anemia     pt states she doesn't think it is a current issue   • Anxiety    • Arthritis     osteo-legs, knees   • ASTHMA     Inhaler as needed.    • Breath shortness     On exertion.    • Chronic low back pain    • Congestive heart failure (HCC)     1/18/22:  pt states she is not sure.    • Dental disorder     upper and lower dentures. 1/18/22: Pt. denies   • Depression    • EtOH dependence ([facility])    • Fall     alcohol related   • GERD (gastroesophageal reflux disease)    • Heart burn    • HTN    • Hypertension    • Indigestion     GERD   • Muscle disorder    • OSTEOPOROSIS    • Other specified symptom associated with female genital organs     \"I have fibroids bad\"\"   • " Psychiatric disorder    • PTSD (post-traumatic stress disorder)    • Renal disorder     Hx of stones   • Renal failure Around 2019    One time related to heeavily drinking per pt.    • Seizure (HCC)     several years ago r/t alcohol withdrawl   • Ulcer    • Vitamin D deficiency        FAMILY HISTORY  Noncontributory    SOCIAL HISTORY  Social History     Socioeconomic History   • Marital status:      Spouse name: Not on file   • Number of children: Not on file   • Years of education: Not on file   • Highest education level: Not on file   Occupational History   • Not on file   Tobacco Use   • Smoking status: Current Every Day Smoker     Packs/day: 0.25     Years: 20.00     Pack years: 5.00     Types: Cigarettes   • Smokeless tobacco: Former User     Types: Chew   • Tobacco comment: 1/2 pack per day   Vaping Use   • Vaping Use: Never used   Substance and Sexual Activity   • Alcohol use: Not Currently     Comment: vodka, heavy use-pt stated she is not drinking as of 3/23   • Drug use: Not Currently     Comment: In the past smoked pot   • Sexual activity: Never     Partners: Male   Other Topics Concern   • Not on file   Social History Narrative    ** Merged History Encounter **          Social Determinants of Health     Financial Resource Strain:    • Difficulty of Paying Living Expenses: Not on file   Food Insecurity:    • Worried About Running Out of Food in the Last Year: Not on file   • Ran Out of Food in the Last Year: Not on file   Transportation Needs:    • Lack of Transportation (Medical): Not on file   • Lack of Transportation (Non-Medical): Not on file   Physical Activity:    • Days of Exercise per Week: Not on file   • Minutes of Exercise per Session: Not on file   Stress:    • Feeling of Stress : Not on file   Social Connections:    • Frequency of Communication with Friends and Family: Not on file   • Frequency of Social Gatherings with Friends and Family: Not on file   • Attends Evangelical Services:  Not on file   • Active Member of Clubs or Organizations: Not on file   • Attends Club or Organization Meetings: Not on file   • Marital Status: Not on file   Intimate Partner Violence:    • Fear of Current or Ex-Partner: Not on file   • Emotionally Abused: Not on file   • Physically Abused: Not on file   • Sexually Abused: Not on file   Housing Stability:    • Unable to Pay for Housing in the Last Year: Not on file   • Number of Places Lived in the Last Year: Not on file   • Unstable Housing in the Last Year: Not on file       SURGICAL HISTORY  Past Surgical History:   Procedure Laterality Date   • PB MASTECTOMY, PARTIAL Right 3/26/2021    Procedure: MASTECTOMY, PARTIAL - ESTEBAN LOCALIZED;  Surgeon: Janice Knowles M.D.;  Location: SURGERY SAME DAY [facility];  Service: General   • AXILLARY NODE DISSECTION Right 3/26/2021    Procedure: LYMPHADENECTOMY, AXILLARY.;  Surgeon: Jaince Knowles M.D.;  Location: SURGERY SAME DAY [facility];  Service: General   • GASTROSCOPY-ENDO N/A 10/3/2018    Procedure: GASTROSCOPY-ENDO;  Surgeon: Ben Negro M.D.;  Location: ENDOSCOPY [facility];  Service: Gastroenterology   • CYSTOSCOPY STENT PLACEMENT  11/9/2010    Performed by ANGEL HARRIS at SURGERY [facility]   • URETEROSCOPY  11/9/2010    Performed by ANGEL HARRIS at [facility]   • LASERTRIPSY  11/9/2010    Performed by ANGEL HARRIS at [facility]   • STENT REMOVAL  11/9/2010    Performed by ANGEL HARRIS at [facility]   • CYSTO STENT PLACEMNT PRE SURG  10/7/2010    Performed by ANGEL HARRIS at [facility]   • OTHER Left 2010    Leg   • HERNIA REPAIR  1977       CURRENT MEDICATIONS  No current facility-administered medications for this encounter.    Current Outpatient Medications:   •  acetaminophen (TYLENOL) 500 MG Tab, Take 1,000 mg by mouth 2 times a day as needed. Indications: Pain, Disp: , Rfl:   •  omeprazole (PRILOSEC) 20 MG delayed-release capsule,  "Take 20 mg by mouth every day., Disp: , Rfl:   •  oxyCODONE immediate-release (ROXICODONE) 5 MG Tab, Take 1 Tablet by mouth every four hours as needed for Severe Pain for up to 7 days., Disp: 30 Tablet, Rfl: 0  •  VENTOLIN  (90 Base) MCG/ACT Aero Soln inhalation aerosol, Inhale 2 Puffs 1 time a day as needed., Disp: , Rfl:   •  Multiple Vitamin (MULTIVITAMIN ADULT PO), Take 1 Tablet by mouth every day., Disp: , Rfl:   •  chlorhexidine (PERIDEX) 0.12 % Solution, Take 15 mL by mouth 2 times a day., Disp: , Rfl:   •  flurazepam (DALMANE) 30 MG capsule, Take 30 mg by mouth at bedtime as needed for Sleep., Disp: , Rfl:   •  prochlorperazine (COMPAZINE) 10 MG Tab, Take 10 mg by mouth 2 times a day as needed for Nausea/Vomiting., Disp: , Rfl:   •  clonazepam (KLONOPIN) 2 MG tablet, Take 2 mg by mouth 3 times a day as needed., Disp: , Rfl:   •  lisinopril (PRINIVIL) 10 MG Tab, Take 10 mg by mouth every day., Disp: , Rfl:   •  gabapentin (NEURONTIN) 400 MG Cap, Take 400 mg by mouth 3 times a day., Disp: , Rfl:   •  fluoxetine (PROZAC) 40 MG capsule, Take 40 mg by mouth every day., Disp: , Rfl:   •  ondansetron (ZOFRAN ODT) 4 MG TABLET DISPERSIBLE, Take 1 Tab by mouth every 6 hours as needed for Nausea., Disp: 20 Tab, Rfl: 0      ALLERGIES  Allergies   Allergen Reactions   • Sulfa Drugs Vomiting   • Toradol Vomiting   • Aspirin Vomiting     Severely sick as a child.        PHYSICAL EXAM  VITAL SIGNS: /92   Pulse 95   Temp 36.6 °C (97.8 °F) (Temporal)   Resp 18   Ht 1.651 m (5' 5\")   Wt 85.3 kg (188 lb)   LMP 11/02/2015   SpO2 95%   BMI 31.28 kg/m²       Constitutional: Mild distress   HENT: Normocephalic, Atraumatic, Bilateral external ears normal, Oropharynx moist, No oral exudates, Nose normal.   Eyes: PERRLA, EOMI, Conjunctiva normal, No discharge.   Neck: Normal range of motion, No tenderness, Supple, No stridor.   Cardiovascular: Normal heart rate, Normal rhythm, No murmurs, No rubs, No gallops. "   Thorax & Lungs: Normal breath sounds, No respiratory distress, there is a several millimeter incision on the left anterior chest wall at the site of prior chest tube/pigtail placement.  There is small amount of bleeding from the site that spontaneously resolved.  No pulsatile bleeding no expanding hematoma noted   abdomen: Bowel sounds normal, Soft, No tenderness, No masses, No pulsatile masses.   Skin: Warm, Dry, No erythema, No rash.   Back: No tenderness, No CVA tenderness.   Extremities: Intact distal pulses, No edema, No tenderness, No cyanosis, No clubbing.   Neurologic: Alert & oriented x 3, Normal motor function, Normal sensory function, No focal deficits noted.   Psychiatric: Anxious    Results for orders placed or performed during the hospital encounter of 01/24/22   CBC WITH DIFFERENTIAL   Result Value Ref Range    WBC 6.5 4.8 - 10.8 K/uL    RBC 3.96 (L) 4.20 - 5.40 M/uL    Hemoglobin 12.6 12.0 - 16.0 g/dL    Hematocrit 36.9 (L) 37.0 - 47.0 %    MCV 93.2 81.4 - 97.8 fL    MCH 31.8 27.0 - 33.0 pg    MCHC 34.1 33.6 - 35.0 g/dL    RDW 44.9 35.9 - 50.0 fL    Platelet Count 230 164 - 446 K/uL    MPV 10.2 9.0 - 12.9 fL    Neutrophils-Polys 64.70 44.00 - 72.00 %    Lymphocytes 22.40 22.00 - 41.00 %    Monocytes 9.80 0.00 - 13.40 %    Eosinophils 1.70 0.00 - 6.90 %    Basophils 1.10 0.00 - 1.80 %    Immature Granulocytes 0.30 0.00 - 0.90 %    Nucleated RBC 0.00 /100 WBC    Neutrophils (Absolute) 4.21 2.00 - 7.15 K/uL    Lymphs (Absolute) 1.46 1.00 - 4.80 K/uL    Monos (Absolute) 0.64 0.00 - 0.85 K/uL    Eos (Absolute) 0.11 0.00 - 0.51 K/uL    Baso (Absolute) 0.07 0.00 - 0.12 K/uL    Immature Granulocytes (abs) 0.02 0.00 - 0.11 K/uL    NRBC (Absolute) 0.00 K/uL   Comp Metabolic Panel   Result Value Ref Range    Sodium 135 135 - 145 mmol/L    Potassium 3.8 3.6 - 5.5 mmol/L    Chloride 99 96 - 112 mmol/L    Co2 24 20 - 33 mmol/L    Anion Gap 12.0 7.0 - 16.0    Glucose 81 65 - 99 mg/dL    Bun 9 8 - 22 mg/dL     Creatinine 0.52 0.50 - 1.40 mg/dL    Calcium 9.3 8.5 - 10.5 mg/dL    AST(SGOT) 24 12 - 45 U/L    ALT(SGPT) 18 2 - 50 U/L    Alkaline Phosphatase 128 (H) 30 - 99 U/L    Total Bilirubin 0.4 0.1 - 1.5 mg/dL    Albumin 4.0 3.2 - 4.9 g/dL    Total Protein 6.7 6.0 - 8.2 g/dL    Globulin 2.7 1.9 - 3.5 g/dL    A-G Ratio 1.5 g/dL   Prothrombin Time   Result Value Ref Range    PT 12.7 12.0 - 14.6 sec    INR 0.98 0.87 - 1.13   APTT   Result Value Ref Range    APTT 26.9 24.7 - 36.0 sec   ESTIMATED GFR   Result Value Ref Range    GFR If African American >60 >60 mL/min/1.73 m 2    GFR If Non African American >60 >60 mL/min/1.73 m 2      DX-CHEST-PORTABLE (1 VIEW)   Final Result      Removal of left pigtail chest tube. No pleural effusion or pneumothorax.            COURSE & MEDICAL DECISION MAKING  Pertinent Labs & Imaging studies reviewed. (See chart for details)  Patient was immediately brought down from outpatient radiology due to bleeding after chest tube removal.  She had some bleeding on arrival that spontaneously resolved.  Her hemoglobin is essentially unchanged.  She did not have any suggestion of pneumonitis or recurrent pneumothorax on chest radiograph today.  She was observed for several hours and did not have any ongoing bleeding or evidence of pulsatile bleeding expanding hematoma etc.  I applied a dressing to the wound and recommended returning as needed for new or worsening symptoms    FINAL IMPRESSION  1.  Chest wall hemorrhage status post chest tube removal         Electronically signed by: Win Hollingsworth M.D., 1/24/2022 2:53 PM

## 2022-01-25 NOTE — ED NOTES
Discharge instructions discussed with pt. Pt verbalized understanding. Pt discharged ambulatory.

## 2022-02-07 NOTE — PROGRESS NOTES
On 2-7-22, Oncology Social Worker, Marcela George, phoned pt to f/u on NN referral for pt call in to request legal information for a will. Pt did not answer. Left message with re-introduction to support team,  information and contact numbers, and OSW Ariel direct contact information.

## 2022-02-17 NOTE — PROGRESS NOTES
Chemotherapy Verification - SECONDARY RN       Height = 162 cm  Weight = 85.2 kg  BSA = 1.96 m2       Medication: Keytruda  Dose: 400 mg (set dose)  Calculated Dose: 400 mg                             (In mg/m2, AUC, mg/kg)     Medication: Carboplatin  Dose: AUC = 2  Calculated Dose: 282.8 mg                             (In mg/m2, AUC, mg/kg)    Medication: Gemzar  Dose: 1,000 mg/m2  Calculated Dose: 1,960 mg                             (In mg/m2, AUC, mg/kg)      Carboplatin calculation (if applicable):  (((140-59)*[id number])*(0.7+(1*0.15))/(0.7*72)+25)*2*((1=1)+(1=2)) = 282.779 mg    I confirm that this process was performed independently.

## 2022-02-17 NOTE — PROGRESS NOTES
Chemotherapy Verification - PRIMARY RN    D1C1    Height = 162cm  Weight = 85.2kg  BSA = 1.96m^2       Medication: Pembrolizumab (Keytruda)  Dose: fixed dose 420mg  Calculated Dose: 420mg                               Medication: Carboplatin (Platinol)  Dose: AUC 2  Calculated Dose: 282mg                             (In mg/m2, AUC, mg/kg)    Medication: Gemcitabine (Gemzar)  Dose: 1000mg/m^2  Calculated Dose: 1960mg                                Carboplatin calculation (if applicable):  (2*(116.046+25)) = 282.092      I confirm this process was performed independently with the BSA and all final chemotherapy dosing calculations congruent.  Any discrepancies of 10% or greater have been addressed with the chemotherapy pharmacist. The resolution of the discrepancy has been documented in the EPIC progress notes.

## 2022-02-17 NOTE — PROGRESS NOTES
"Rena arrived ambulatory to [facility] for D1C1 Keytruda/Carbo/Gemzar. POC discussed with pt and she agrees with plan. Pt states she is anxious because she had a reaction to \"Taxol\" during a prior treatment. This RN answered pt's questions. This RN assured pt she is NOT getting Taxol today. Pt with call light in reach, pt in position of comfort in recliner.   PIV established, brisk blood return noted. Labs drawn as ordered. Results reviewed, ok to proceed with treatment. Pt medicated per MAR. Pt tolerated treatment without s/s adverse reaction. PIV dc'd catheter tip intact, gauze and coban dressing applied. Pt discharged to self care, NAD. Pt's next appt confirmed 2/24/2022.  "

## 2022-02-17 NOTE — PROGRESS NOTES
"Pharmacy Chemotherapy calculation:    DX: Breast CA Stage IIA ER-, TX-, HER2-    Cycle 1    Day 1  Previous treatment = s/p 4 cycles of docetaxel + cyclophosphamide last 7/5/21    Regimen: Pembrolizumab + Carboplatin + Gemcitabine    *Dosing Reference*  Pembrolizumab 400 mg IV over 30 minutes on Day 1   42-day cycle until DP or UT or until up to 24 months of therapy has been completed concurrent with  Carboplatin AUC 2 IV over 30 minutes on Days 1 and 8  Gemcitabine 1000 mg/m2 IV over 30 minutes on Days 1 and 8   21-day cycle until DP or UT  NCCN Guidelines for Breast Cancer V.2.2022.  Moncho J, et al. Lancet. 2020;396(64653);9737-2290.  Risa M, et al. Eur J Cancer. 2020;131;68-75.    Allergies: Sulfa drugs, Toradol, and Aspirin  /80   Pulse 87   Temp 36.4 °C (97.5 °F) (Temporal)   Resp 18   Ht 1.62 m (5' 3.78\")   Wt 85.2 kg (187 lb 13.3 oz)   LMP 11/02/2015   SpO2 96%   BMI 32.46 kg/m²   Body surface area is 1.96 meters squared.    Labs 2/17/22  ANC~ 5160 Plt = 261k   Hgb = 12.8     SCr = 0.6 mg/dL CrCl ~ 116 mL/min (minimum SCr of 0.7)   LFT's = 20/16/143 TBili = 0.3   TSH = pending Free T4 = pending    Pembrolizumab 400 mg fixed dose = 400 mg   <10% difference, okay to treat with final dose = 400 mg IV    Carboplatin AUC 2 (116 mL/min + 25) = 282 mg   <10% difference, okay to treat with final dose = 280 mg IV    Gemcitabine 1000 mg/m2 x 1.96 m2 = 1960 mg   <10% difference, okay to treat with final dose = 1960 mg IV    Jung Sena, PharmD    "

## 2022-02-17 NOTE — PROGRESS NOTES
"Pharmacy Chemotherapy Calculation    Patient Name: Ashleigh Marquis   Dx: Invasive ductal carcinoma- ER/AZ neg, HER2 1+  Protocol: Pembrolizumab + CARBOplatin/Gemcitabine      *Dosing Reference*  Pembrolizumab 400 mg IV over 30 minutes on Day 1    42 day cycle until DP or UT or until up to 24 months of therapy has been completed   CARBOplatin AUC 2 IV over 30 minute on Days 1 and 8   Gemcitabine 1000 mg/m²  IV over 30 minutes on Days 1 and 8    21 day cycle until DP or UT  NCCN Guidelines for Breast Cancer V.2.2022.   Moncho LINDSAY, et al. Lancet. 2020;396(70909): 4816-9837.  Risa M, et al. Eur J Cancer. 2020;131:68-75.    Allergies:  Sulfa drugs, Toradol, and Aspirin     /80   Pulse 87   Temp 36.4 °C (97.5 °F) (Temporal)   Resp 18   Ht 1.62 m (5' 3.78\")   Wt 85.2 kg (187 lb 13.3 oz)   LMP 11/02/2015   SpO2 96%   BMI 32.46 kg/m²  Body surface area is 1.96 meters squared.    Labs 2/17/22:  ANC~ 5160 Plt = 261k   Hgb = 12.8     SCr = 0.6 mg/dL CrCl ~ 116 mL/min (Minimum SCr of 0.7 used)  AST/ALT/ALK = 20/16/143 TBili = 0.3   TSH/ Free T4 = pending      Drug Order   (Drug name, dose, route, IV Fluid & volume, frequency, number of doses) Cycle 1 Day 1  Previous treatment: s/p 4 cycles of TC 7/5/21     Medication = Pembrolizumab (Keytruda)   Base Dose= 400 mg   Fixed dose, no calculations required  Final Dose = 400 mg  Route = IV  Fluid & Volume = NS 50 mL  Admin Duration = Over 30 mintues          Fixed dose, no calculation required. Okay to treat with final dose.   Medication = CARBOplatin (Paraplatin)   Base Dose= AUC 2   Calc Dose: Base Dose x (116 mL/min + 25) = 282 mg  Final Dose = 280 mg  Route = IV  Fluid & Volume =  mL  Admin Duration = Over 30 minutes          <10% difference, okay to treat with final dose   Medication = Gemcitabine (Gemzar)  Base Dose= 1000 mg/m²   Calc Dose:Base Dose x 1.96 m² = 1960 mg  Final Dose = 1960 mg  Route = IV  Fluid & Volume =  mL  Admin Duration = Over 30 " minutes          <10% difference, okay to treat with final dose     By my signature below, I confirm this process was performed independently with the BSA and all final chemotherapy dosing calculations congruent. I have reviewed the above chemotherapy order and that my calculation of the final dose and BSA (when applicable) corroborate those calculations of the  pharmacist. Discrepancies of 10% or greater in the written dose have been addressed and documented within the EPIC Progress notes.    Kade Clements, PharmD

## 2022-02-24 NOTE — PROGRESS NOTES
"Pharmacy Chemotherapy calculation:    DX: Breast CA Stage IIA ER-, CA-, HER2-    Cycle 1, Day 8  Previous treatment = C1D1 on 2/17/22    Regimen: Pembrolizumab + Carboplatin + Gemcitabine  *Dosing Reference*  Pembrolizumab 400 mg IV over 30 minutes on Day 1  42-day cycle until DP or UT or until up to 24 months of therapy has been completed concurrent with  Carboplatin AUC 2 IV over 30 minutes on Days 1 and 8  Gemcitabine 1000 mg/m2 IV over 30 minutes on Days 1 and 8  21-day cycle until DP or UT  NCCN Guidelines for Breast Cancer V.2.2022.  Moncho J, et al. Lancet. 2020;396(12112);9188-3392.  Risa M, et al. Eur J Cancer. 2020;131;68-75.    Allergies: Sulfa drugs, Toradol, and Aspirin  /63   Pulse 74   Temp 36.8 °C (98.2 °F) (Temporal)   Resp 18   Ht 1.62 m (5' 3.78\")   Wt 83 kg (182 lb 15.7 oz)   LMP 11/02/2015   SpO2 98%   BMI 31.63 kg/m²   Body surface area is 1.93 meters squared.    All labs (2/24/22) and thyroid panel within treatment plan parameters.  SCr = 0.7 (per iSTAT approved by Dr. Valle) CrCl = 113 ml/min      Pembrolizumab (Keytruda) 400 mg fixed dose   No calculation required, okay to treat with final dose = 400 mg IV    Carboplatin (Paraplatin) AUC 2 (113 mL/min + 25) = 276 mg   <10% difference, okay to treat with final dose = 276 mg IV    Gemcitabine (Gemzar) 1000 mg/m2 x 1.93 m2 = 1930 mg   <10% difference, okay to treat with final dose = 1930 mg IV      Jessica Laws, PharmD  "

## 2022-02-24 NOTE — PROGRESS NOTES
Chemotherapy Verification - SECONDARY RN       Height = 162 cm  Weight = 83 kg  BSA = 1.93 m2       Medication: Carboplatin  Dose: AUC = 2  Calculated Dose: 276.77 mg                             (In mg/m2, AUC, mg/kg)     Medication: Gemzar  Dose: 1,000 mg/m2  Calculated Dose: 1,930 mg                             (In mg/m2, AUC, mg/kg)      Carboplatin calculation (if applicable):  (((140-59)*[id number])*(0.7+(1*0.15))/(0.7*72)+25)*2*((1=1)+(1=2)) = 276.768 mg    I confirm that this process was performed independently.

## 2022-02-24 NOTE — PROGRESS NOTES
"Pharmacy Chemotherapy Calculation    Patient Name: Ashleigh Marquis   Dx: Invasive ductal carcinoma- ER/DC neg, HER2 1+  Protocol: Pembrolizumab + CARBOplatin/Gemcitabine      *Dosing Reference*  Pembrolizumab 400 mg IV over 30 minutes on Day 1    42 day cycle until DP or UT or until up to 24 months of therapy has been completed   CARBOplatin AUC 2 IV over 30 minute on Days 1 and 8   Gemcitabine 1000 mg/m²  IV over 30 minutes on Days 1 and 8    21 day cycle until DP or UT  NCCN Guidelines for Breast Cancer V.2.2022.   Moncho LINDSAY, et al. Lancet. 2020;396(10381): 8989-7180.  Risa M, et al. Eur J Cancer. 2020;131:68-75.    Allergies:  Sulfa drugs, Toradol, and Aspirin     /63   Pulse 74   Temp 36.8 °C (98.2 °F) (Temporal)   Resp 18   Ht 1.62 m (5' 3.78\")   Wt 83 kg (182 lb 15.7 oz)   LMP 11/02/2015   SpO2 98%   BMI 31.63 kg/m²  Body surface area is 1.93 meters squared.    Labs 2/24/22:  ANC~ 1420 Plt = 195k   Hgb = 11.6     SCr = 0.7 mg/dL CrCl ~ 113 mL/min     Labs 2/17/22:     AST/ALT/ALK = 20/16/143 TBili = 0.3   TSH/ Free T4 = 0.950/1.04     Drug Order   (Drug name, dose, route, IV Fluid & volume, frequency, number of doses) Cycle 1 Day 8  Previous treatment: C1D1 on 2/17/22  (s/p 4 cycles of TC 7/5/21)     Medication = Pembrolizumab (Keytruda)   Base Dose= 400 mg   Fixed dose, no calculations required  Final Dose = 400 mg  Route = IV  Fluid & Volume = NS 50 mL  Admin Duration = Over 30 mintues          Fixed dose, no calculation required. Okay to treat with final dose.   Medication = CARBOplatin (Paraplatin)   Base Dose= AUC 2   Calc Dose: Base Dose x (113 mL/min + 25) = 276 mg  Final Dose = 276 mg  Route = IV  Fluid & Volume =  mL  Admin Duration = Over 30 minutes          <10% difference, okay to treat with final dose   Medication = Gemcitabine (Gemzar)  Base Dose= 1000 mg/m²   Calc Dose:Base Dose x 1.93 m² = 1930 mg  Final Dose = 1930 mg  Route = IV  Fluid & Volume =  mL  Admin " Duration = Over 30 minutes          <10% difference, okay to treat with final dose     By my signature below, I confirm this process was performed independently with the BSA and all final chemotherapy dosing calculations congruent. I have reviewed the above chemotherapy order and that my calculation of the final dose and BSA (when applicable) corroborate those calculations of the  pharmacist. Discrepancies of 10% or greater in the written dose have been addressed and documented within the EPIC Progress notes.    Kassi Ritter, PharmD

## 2022-02-25 NOTE — PROGRESS NOTES
Ashleigh arrives ambulatory to [facility] for Day 8 Cycle 1 carbo / gemzar.  Ashleigh voices no complaints.  PIV placed, brisk blood return observed, labs collected.  Parameters met for treatment today.  Pre-medications and chemotherapy administered per Mar.  Carboplatin currently infusiing, Ashleigh tolerating well.  Report given to Cele IRENE, Cele assumed care.

## 2022-02-25 NOTE — PROGRESS NOTES
Chemotherapy Verification - PRIMARY RN      Height = 162 cm  Weight = 83 kg  BSA = 1.93 m2       Medication: gemzar  Dose: 1000 mg/m2  Calculated Dose: 1930 mg                             (In mg/m2, AUC, mg/kg)     Medication: Carboplatin  Dose: Target AUC = 2  Calculated Dose: 276 mg                             (In mg/m2, AUC, mg/kg)        Carboplatin calculation (if applicable):  (2*(113.023+25)) = 276.046      I confirm this process was performed independently with the BSA and all final chemotherapy dosing calculations congruent.  Any discrepancies of 10% or greater have been addressed with the chemotherapy pharmacist. The resolution of the discrepancy has been documented in the EPIC progress notes.

## 2022-02-25 NOTE — PROGRESS NOTES
Assumed care of Ashleigh for the remainder of her Carboplatin infusion. Carboplatin finished infusing without complications. PIV with positive blood return post-treatment and flushed with ease. PIV removed with tip intact. Confirmed next appt on 3/10 with patient. Discharged home to self care in no apparent distress.

## 2022-03-19 NOTE — TELEPHONE ENCOUNTER
Attempted to call patient regarding her chemo appointment today at 1330. The cell phone went straight to . The home phone was the wrong number.

## 2022-03-24 NOTE — TELEPHONE ENCOUNTER
Call placed to pt to confirm upcoming appt on 3/26/22 and 4/5/22. Pt is aware of appts and times. She will reach out to coordinate rides for these appts today.

## 2022-03-26 NOTE — PROGRESS NOTES
"Pharmacy Chemotherapy calculation:    DX: Breast CA Stage IIA ER-, AR-, HER2-    Cycle 2, Day 1  Previous treatment = C1D8 on 2/24/22    Regimen: Pembrolizumab + Carboplatin + Gemcitabine  *Dosing Reference*  Pembrolizumab 400 mg IV over 30 minutes on Day 1  42-day cycle until DP or UT or until up to 24 months of therapy has been completed concurrent with  Carboplatin AUC 2 IV over 30 minutes on Days 1 and 8  Gemcitabine 1000 mg/m2 IV over 30 minutes on Days 1 and 8   3/26/22, starting with C2, dose reduced by Tori MCCARTHY to 750 mg/m2    21-day cycle until DP or UT  NCCN Guidelines for Breast Cancer V.2.2022.  Ivan J, et al. Lancet. 2020;396(97142);2563-9501.  Risa MCCORMICK, et al. Eur J Cancer. 2020;131;68-75.    Allergies: Sulfa drugs, Toradol, and Aspirin  /83   Pulse 84   Temp 36.4 °C (97.6 °F) (Temporal)   Resp 18   Ht 1.62 m (5' 3.78\")   Wt 82.8 kg (182 lb 8.7 oz)   LMP 11/02/2015   SpO2 100%   BMI 31.55 kg/m²   Body surface area is 1.93 meters squared.     Labs (3/25/22) and thyroid panel within treatment plan parameters.  SCr = 0.7 CrCl ~ 113 ml/min    Pembrolizumab (Keytruda) 400 mg fixed dose   No calculation required, okay to treat with final dose = 400 mg IV    Carboplatin (Paraplatin) AUC 2 (113 mL/min + 25) = 276 mg   <10% difference, okay to treat with final dose = 280 mg IV    Gemcitabine (Gemzar) 750 mg/m2 x 1.93 m2 = 1448 mg   <10% difference, okay to treat with final dose = 1447 mg IV    Brian Ambrosio, PharmD  "

## 2022-03-26 NOTE — PROGRESS NOTES
Chemotherapy Verification - SECONDARY RN       Height = 1.62m  Weight = 82.8kg  BSA = 1.93m2       Medication: carboplatin  Dose: AUC 2  Calculated Dose: 275.2mg                             (In mg/m2, AUC, mg/kg)     Medication: gemcitabine  Dose: 750mg/m2  Calculated Dose: 1447mg                             (In mg/m2, AUC, mg/kg)      Carboplatin calculation (if applicable):  (2*(112.6+25)) = 275.2    I confirm that this process was performed independently.

## 2022-03-26 NOTE — PROGRESS NOTES
Chemotherapy Verification - PRIMARY RN      Height = 63.78 in  Weight = 182 lb  BSA = 1.93 m2       Medication: Carboplatin  Dose: 2 AUC  Calculated Dose: 275.272 mg                             (In mg/m2, AUC, mg/kg)     Medication: Gemzar  Dose: 750 mg/m2  Calculated Dose: 1,447 mg                             (In mg/m2, AUC, mg/kg)    Carboplatin calculation (if applicable):  (2*(112.636+25)) = 275.272 mg    I confirm this process was performed independently with the BSA and all final chemotherapy dosing calculations congruent.  Any discrepancies of 10% or greater have been addressed with the chemotherapy pharmacist. The resolution of the discrepancy has been documented in the EPIC progress notes.

## 2022-03-26 NOTE — PROGRESS NOTES
into [facility] for Day 1 / Cycle 2 of Gemzar / Carboplatin / Labs for Malignant neoplasm of central portion of breast in female, estrogen. Pt denied having any new or acute complaints today, reports tolerating past treatments well. PIV started, had + blood return flushed briskly. Pt given anticancer therapy as prescribed, tolerated well, denied having any complaints during or after infusion. Patient encourage to push PO fluids (H2O). PIV discontinued, bleeding controlled with gauze and coban. Discharge home to self care in [facility]. Appointment confirm for next treatment.

## 2022-03-26 NOTE — PROGRESS NOTES
"Pharmacy Chemotherapy Calculation    Patient Name: Ashleigh Marquis   Dx: Invasive ductal carcinoma- ER/OR neg, HER2 1+  Protocol: Pembrolizumab + CARBOplatin/Gemcitabine      *Dosing Reference*  Pembrolizumab 400 mg IV over 30 minutes on Day 1    42 day cycle until DP or UT or until up to 24 months of therapy has been completed   CARBOplatin AUC 2 IV over 30 minute on Days 1 and 8   Gemcitabine   IV over 30 minutes on Days 1 and 8   NOTE 3/26/22: Dose reduced to 750 mg/m² with Cycle 2 Day 1 per Dr. Valle    21 day cycle until DP or UT  NCCN Guidelines for Breast Cancer V.2.2022.   Moncho J, et al. Lancet. 2020;396(33990): 8946-3999.  Risa M, et al. Eur J Cancer. 2020;131:68-75.    Allergies:  Sulfa drugs, Toradol, and Aspirin     /83   Pulse 84   Temp 36.4 °C (97.6 °F) (Temporal)   Resp 18   Ht 1.62 m (5' 3.78\")   Wt 82.8 kg (182 lb 8.7 oz)   LMP 11/02/2015   SpO2 100%   BMI 31.55 kg/m²  Body surface area is 1.93 meters squared.    Labs 3/26/22:  ANC~ 4810 Plt = 300k   Hgb = 13.6     SCr = 0.7 mg/dL CrCl ~ 113mL/min   AST/ALT/AP = 45/26/141 TBili = 0.2        Drug Order   (Drug name, dose, route, IV Fluid & volume, frequency, number of doses) Cycle 2 Day 1  Previous treatment: C1D8 on 2/24/22  (s/p 4 cycles of TC 7/5/21)     Medication = Pembrolizumab (Keytruda)   Base Dose= 400 mg   Fixed dose, no calculations required  Final Dose = 400 mg  Route = IV  Fluid & Volume = NS 50 mL  Admin Duration = Over 30 mintues   Next due on 3/31/22      Fixed dose, no calculation required. Okay to treat with final dose.   Medication = CARBOplatin (Paraplatin)   Base Dose= AUC 2   Calc Dose: Base Dose x (113 mL/min + 25) = 276 mg  Final Dose = 280 mg  Route = IV  Fluid & Volume =  mL  Admin Duration = Over 30 minutes          <10% difference, okay to treat with final dose   Medication = Gemcitabine (Gemzar)  Base Dose= 750 mg/m²    Calc Dose:Base Dose x 1.93 m² = 1447.5 mg  Final Dose = 1447 mg  Route = " IV  Fluid & Volume =  mL  Admin Duration = Over 30 minutes          <10% difference, okay to treat with final dose     By my signature below, I confirm this process was performed independently with the BSA and all final chemotherapy dosing calculations congruent. I have reviewed the above chemotherapy order and that my calculation of the final dose and BSA (when applicable) corroborate those calculations of the  pharmacist. Discrepancies of 10% or greater in the written dose have been addressed and documented within the EPIC Progress notes.    Kassi Ritter, PharmD

## 2022-03-28 NOTE — PROGRESS NOTES
On 3-28-22, Oncology Social Worker, Marcela George, received incoming call from pt. OSCYN George inquired on distress score of 7, relating to tx decisions, nervousness, fears, depression, worry, and sadness.  Pt reports feeling sick after tx and has been tired and sleeping more. Pt reports going to [facility] and getting wig cap for her wig. Pt reports having some leg pain on and off. Pt reports she is doing ok otherwise and appreciated ANNABEL George reaching out.  Pt reports no other issues at this time.  ANNABEL George thanked pt for return call and encouraged pt to contact.

## 2022-03-28 NOTE — PROGRESS NOTES
On 3-28-22, Oncology Social Worker, Marcela George, phoned pt to f/u on distress score of 7, relating to tx decisions, nervousness, fears, depression, worry, and sadness. Pt did not answer. Left message with re-introduction to support team and OSW Ariel direct contact information.

## 2022-04-02 NOTE — PROGRESS NOTES
"Pharmacy Chemotherapy Calculation  Patient Name: Ashleigh Marquis   Dx: Invasive ductal carcinoma- ER/[place] neg, HER2 1+  Protocol: Pembrolizumab + CARBOplatin/Gemcitabine      *Dosing Reference*  Pembrolizumab 400 mg IV over 30 minutes on Day 1    42 day cycle until DP or UT or until up to 24 months of therapy has been completed   CARBOplatin AUC 2 IV over 30 minute on Days 1 and 8   Gemcitabine   IV over 30 minutes on Days 1 and 8    3/26/22: Dose reduced to 750 mg/m² with Cycle 2 Day 1 per Dr. Valle   21 day cycle until DP or UT  NCCN Guidelines for Breast Cancer V.2.2022.   Moncho J, et al. Lancet. 2020;396(67046): 7167-5561.  Risa M, et al. Eur J Cancer. 2020;131:68-75.    Allergies:  Sulfa drugs, Toradol, and Aspirin     /78   Pulse 92   Temp 36.5 °C (97.7 °F) (Temporal)   Resp 18   Ht 1.62 m (5' 3.78\")   Wt 82.6 kg (182 lb 1.6 oz)   LMP 11/02/2015   SpO2 95%   BMI 31.47 kg/m²  Body surface area is 1.93 meters squared.    Labs 4/2/22  ANC~ 1810 Plt = 186k   Hgb = 12.7     SCr = 0.65 mg/dL CrCl ~ 112.3 mL/min (minimum SCr of 0.7)      Drug Order   (Drug name, dose, route, IV Fluid & volume, frequency, number of doses) Cycle 2 Day 8  Previous treatment: C2D1 on 3/26/22     Medication = CARBOplatin (Paraplatin)   Base Dose= AUC 2   Calc Dose: Base Dose x (112.3 mL/min + 25) = 274.6 mg  Final Dose = 276 mg  Route = IV  Fluid & Volume =  mL  Admin Duration = Over 30 minutes          <10% difference, okay to treat with final dose   Medication = Gemcitabine (Gemzar)  Base Dose= 750 mg/m²    Calc Dose:Base Dose x 1.93 m² = 1447.5 mg  Final Dose = 1447 mg  Route = IV  Fluid & Volume =  mL  Admin Duration = Over 30 minutes          <10% difference, okay to treat with final dose   By my signature below, I confirm this process was performed independently with the BSA and all final chemotherapy dosing calculations congruent. I have reviewed the above chemotherapy order and that my calculation of " the final dose and BSA (when applicable) corroborate those calculations of the  pharmacist. Discrepancies of 10% or greater in the written dose have been addressed and documented within the EPIC Progress notes.    Jung Sena, PharmD

## 2022-04-02 NOTE — PROGRESS NOTES
Chemotherapy Verification - PRIMARY RN      Height = 1.62m  Weight = 82.6kg  BSA = 1.93m2       Medication: carboplatin  Dose: AUC 2  Calculated Dose: 274.6mg                             (In mg/m2, AUC, mg/kg)     Medication: gemcitabine  Dose: 750mg/m2  Calculated Dose: 1447mg                             (In mg/m2, AUC, mg/kg)        Carboplatin calculation (if applicable): (2*(112.33+25)) = 274.66       I confirm this process was performed independently with the BSA and all final chemotherapy dosing calculations congruent.  Any discrepancies of 10% or greater have been addressed with the chemotherapy pharmacist. The resolution of the discrepancy has been documented in the EPIC progress notes.

## 2022-04-02 NOTE — PROGRESS NOTES
Chemotherapy Verification - SECONDARY RN     D8C2    Height = 162cm  Weight = 82.6kg  BSA = 1.93m^2       Medication: Carboplatin (Paraplatin)  Dose: AUC 2  Calculated Dose: 274.674mg                             (In mg/m2, AUC, mg/kg)     Medication: Gemcitabine (Gemzar)  Dose: 750mg/m^2  Calculated Dose: 1447mg                                  Carboplatin calculation (if applicable):  (2*(112.337+25)) = 274.674    I confirm that this process was performed independently.

## 2022-04-03 NOTE — PROGRESS NOTES
Patient to [facility] for chemotherapy infusion. PIV inserted into left AC, flushed with + blood return, labs drawn. Patient meets parameters for infusion. Premeds given. Gemzar infused with no s/s of infusion reaction. Carboplatin infused with no s/s of infusion reaction. PIV flushed with + blood return and removed. Gauze and coban applied as a dressing. Patient has future appointment. Patient to home in care of self.

## 2022-04-14 NOTE — PROGRESS NOTES
"Pharmacy Chemotherapy Verification    DX: Breast CA Stage IIA ER-, AL-, HER2-    Cycle 3, Day 1  Previous treatment = C2D8 4/2/22    Regimen: Pembrolizumab + CARBOplatin + Gemcitabine  Pembrolizumab 400 mg IV over 30 minutes on Day 1  42-day cycle until DP or UT or until up to 24 months of therapy has been completed concurrent with  CARBOplatin AUC 2 IV over 30 minutes on Days 1 and 8  Gemcitabine 1000 mg/m2 IV over 30 minutes on Days 1 and 8   -3/26/22, starting with C2, dose reduced by Tori MCCARTHY to 750 mg/m2    21-day cycle until DP or UT  NCCN Guidelines for Breast Cancer V.2.2022.  Ivan J, et al. Lancet. 2020;396(79879);4607-3744.  Risa M, et al. Eur J Cancer. 2020;131;68-75.    Allergies: Sulfa drugs, Toradol, and Aspirin  /86   Pulse 98   Temp 36.1 °C (97 °F) (Temporal)   Resp 18   Ht 1.62 m (5' 3.78\")   Wt 86 kg (189 lb 9.5 oz)   LMP 11/02/2015   SpO2 98%   BMI 32.77 kg/m²   Body surface area is 1.97 meters squared.     Labs 4/18/22  ANC 1400 Hgb 10.8 Plt 290k  SCr 0.57 CrCl 117 mL/min   AST/ALT/AP = 30/18/135 Tbili<0.2  TSH/Free T4 in process    Pembrolizumab (Keytruda) 400 mg fixed dose, no calculation required   <10% difference, OK to treat with final dose = 400 mg IV    CARBOplatin (Paraplatin) AUC 2 (117 mL/min + 25) = 284 mg   <10% difference, okay to treat with final dose = 280 mg IV    Gemcitabine (Gemzar) 750 mg/m2 x 1.97 m2 = 1477.5 mg   <10% difference, okay to treat with final dose = 1477 mg IV    Kandis Delgado, PharmD, BCOP  "

## 2022-04-18 NOTE — PROGRESS NOTES
Chemotherapy Verification - PRIMARY RN  C3 D1      Height = 1.62 m  Weight = 86 kg  BSA = 1.97 m2       Medication: pembrolizumab  Dose: set dose  Calculated Dose: 400 mg set dose no calc required                             (In mg/m2, AUC, mg/kg)     Medication: carboplatin  Dose: AUC=2  Calculated Dose: 283.8 mg                             (In mg/m2, AUC, mg/kg)    Medication: gemcitabine  Dose: 750 mg/m2  Calculated Dose: 1477.5 mg                             (In mg/m2, AUC, mg/kg)    Carboplatin calculation (if applicable):  (2*(116.898+25)) = 283.796 mg      I confirm this process was performed independently with the BSA and all final chemotherapy dosing calculations congruent.  Any discrepancies of 10% or greater have been addressed with the chemotherapy pharmacist. The resolution of the discrepancy has been documented in the EPIC progress notes.

## 2022-04-18 NOTE — PROGRESS NOTES
"Pharmacy Chemotherapy Calculation    Patient Name: Ashleigh Marquis   Dx: Invasive ductal carcinoma- ER/[place] neg, HER2 1+  Protocol: Pembrolizumab + CARBOplatin/Gemcitabine      *Dosing Reference*  Pembrolizumab 400 mg IV over 30 minutes on Day 1    42 day cycle until DP or UT or until up to 24 months of therapy has been completed   CARBOplatin AUC 2 IV over 30 minute on Days 1 and 8   Gemcitabine   IV over 30 minutes on Days 1 and 8   NOTE 3/26/22: Dose reduced to 750 mg/m² with Cycle 2 Day 1 per Dr. Valle    21 day cycle until DP or UT  NCCN Guidelines for Breast Cancer V.2.2022.   Moncho LINDSAY, et al. Lancet. 2020;396(90371): 0062-2924.  Risa M, et al. Eur J Cancer. 2020;131:68-75.    Allergies:  Sulfa drugs, Toradol, and Aspirin     /86   Pulse 98   Temp 36.1 °C (97 °F) (Temporal)   Resp 18   Ht 1.62 m (5' 3.78\")   Wt 86 kg (189 lb 9.5 oz)   LMP 11/02/2015   SpO2 98%   BMI 32.77 kg/m²  Body surface area is 1.97 meters squared.    Labs 4/18/22:  ANC~ 1400 Plt = 290k   Hgb = 10.8     SCr = 0.57 mg/dL CrCl ~ 117 mL/min (Minimum SCr of 0.7 used)  AST/ALT/ALK = 30/18/135 TBili = <0.2   TSH = 1.05 Free T4 = 0.93     Drug Order   (Drug name, dose, route, IV Fluid & volume, frequency, number of doses) Cycle 3 Day 1  Previous treatment: C2D8 on 4/2/22     Medication = Pembrolizumab (Keytruda)   Base Dose= 400 mg   Fixed dose, no calculations required  Final Dose = 400 mg  Route = IV  Fluid & Volume = NS 50 mL  Admin Duration = Over 30 mintues   Next due 5/30/22      Fixed dose, no calculation required. Okay to treat with final dose.   Medication = CARBOplatin (Paraplatin)   Base Dose= AUC 2   Calc Dose: Base Dose x (117 mL/min + 25) = 284 mg  Final Dose = 280 mg  Route = IV  Fluid & Volume =  mL  Admin Duration = Over 30 minutes          <10% difference, okay to treat with final dose   Medication = Gemcitabine (Gemzar)  Base Dose= 750 mg/m²    Calc Dose:Base Dose x 1.97 m² = 1477.5 mg  Final Dose = 1477 " mg  Route = IV  Fluid & Volume =  mL  Admin Duration = Over 30 minutes          <10% difference, okay to treat with final dose     By my signature below, I confirm this process was performed independently with the BSA and all final chemotherapy dosing calculations congruent. I have reviewed the above chemotherapy order and that my calculation of the final dose and BSA (when applicable) corroborate those calculations of the  pharmacist. Discrepancies of 10% or greater in the written dose have been addressed and documented within the EPIC Progress notes.    Kade Clements, PharmD

## 2022-04-18 NOTE — PROGRESS NOTES
Chemotherapy Verification - SECONDARY RN       Height = 162 cm  Weight = 86 kg  BSA = 1.97 m2       Medication: Keytruda  Dose: 400 mg set dose Calculated Dose: 400 mg                             (In mg/m2, AUC, mg/kg)     Medication: Carboplatin  Dose: AUC 2  Calculated Dose: 283.8 mg                            (In mg/m2, AUC, mg/kg)    Medication: Gemzar  Dose: 1000 mg/m2  Calculated Dose: 1970 mg                             (In mg/m2, AUC, mg/kg)    Carboplatin calculation (if applicable) (2*(116.9+25)) = 283.8    I confirm that this process was performed independently.

## 2022-04-19 NOTE — PROGRESS NOTES
Pt ambulatory to Infusion for C3 D1 of carbo, gemcitabine, pembrolizumab for breast cancer.  Pt w/ no s/s of infection, pt has no complaints at this time.  PIV established in LAC, brisk blood return noted, labs drawn per orders flushed per protocol.  Pt lab results w/n parameters for tx today.  Pre-meds given prior to therapy.  Pembrolizumab and carboplatin infused w/ no s/s of AE.  1800  Report given to Cele who will finish caring for the pt. Confirmed pt's next appt.

## 2022-04-19 NOTE — PROGRESS NOTES
Assumed care of Ashleigh for the remainder of her Gemzar infusion. Report received from REYNALDO RN. Patient tolerated treatments well without adverse s/s. PIV with brisk blood return post-chemotherapy, flushed, and removed with tip intact. Patient has her next appointment. Discharged home to self care in no apparent distress.

## 2022-04-19 NOTE — PROGRESS NOTES
Chemotherapy Verification - SECONDARY RN       Height = 162 cm  Weight = 86 kg  BSA = 1.97 m2       Medication: Gemzar  Dose: 750 mg/m2  Calculated Dose: 1477.5 mg                               I confirm that this process was performed independently.

## 2022-04-20 NOTE — TELEPHONE ENCOUNTER
Rec'vd transferred call from Ryland Alexander, stating that pt was asking questions regarding her imaging scan that Dr. Valle ordered.  Pt started explaining to RN that Dr. Valle was not clear about what her imaging results were & that is why she was wanting to switch providers & be seen in our office.  RN explained to pt that this RN cannot discuss results with her as she has not established care in our office yet & imaging was not ordered by a provider in our office.  Advised pt to discuss these results with Dr. Bailey at her upcoming consultation appt.  Pt verbalized understanding & agreeable.

## 2022-04-25 NOTE — PROGRESS NOTES
"Pharmacy Chemotherapy Calculation     * * * Does not meet parameters; no treatment today * * *     Patient Name: Ashleigh Marquis   Dx: Invasive ductal carcinoma- ER/[place] neg, HER2 1+  Protocol: Pembrolizumab + CARBOplatin/Gemcitabine      Cycle 3 Day 8  Previous treatment:  4/18/22    Pembrolizumab 400 mg IV over 30 minutes on Day 1    42 day cycle until DP or UT or until up to 24 months of therapy has been completed   CARBOplatin AUC 2 IV over 30 minute on Days 1 and 8   Gemcitabine   IV over 30 minutes on Days 1 and 8   NOTE 3/26/22: Dose reduced to 750 mg/m² with Cycle 2 Day 1 per Dr. Valle    21 day cycle until DP or UT  *Dosing Reference*  NCCN Guidelines for Breast Cancer V.2.2022  Moncho J, et al. Lancet. 2020;396(45055): 9307-8105  Risa MCCORMICK, et al. Eur J Cancer. 2020;131:68-75    Allergies:  Sulfa drugs, Toradol, and Aspirin     /89   Pulse 88   Temp 36.7 °C (98.1 °F) (Temporal)   Resp 18   Ht 1.62 m (5' 3.78\")   Wt 84.7 kg (186 lb 11.7 oz)   LMP 11/02/2015   SpO2 95%   BMI 32.27 kg/m²  Body surface area is 1.95 meters squared.    Labs 4/25/22:   * * * Does not meet parameters; no treatment today * * *   *  *  SCr = 0.65 mg/dL, estCrCL * mL/min (Minimum SCr of 0.7 used)  Previous TSH = 1.05, free T4 = 0.93    Pembrolizumab (Keytruda) 400 mg fixed dose   No calculation required   OK to treat with final dose = 400 mg IV     CARBOplatin (Paraplatin) AUC 2 (estCrCL + 25) = * mg   Okay to treat with final dose = * mg IV     Gemcitabine (Gemzar) 750 mg/m2 x 1.95 m2 = * mg   Okay to treat with final dose = * mg IV  "

## 2022-04-25 NOTE — PROGRESS NOTES
Patient presents to Infusion Services for labs and Cycle 3 Day 8 of Gemzar/Carbo. PIV started by RN Marlene Kothari, labs drawn as ordered.  and WBC 1.9. Per Priscilla Santoro hold treatment today and patient to follow-up with provider on 4/28/2022 in office. Discussed POC with patient. Patient in agreement. Patient understands importance of handwashing and avoiding sick persons. Patient to follow-up with provided on 4/28/2022. PIV removed gauze and coban to site. Patient discharged to self care in no apparent distress.      Autumn from Lab called with critical result of WBC 1.9 at 1232 Critical lab result read back to Autumn.   Priscilla Santoro notified of critical lab result at 1241.  Critical lab result read back by Priscilla Santoro.

## 2022-04-28 PROBLEM — C78.00 CARCINOMA OF RIGHT BREAST METASTATIC TO LUNG (HCC): Status: ACTIVE | Noted: 2022-01-01

## 2022-04-28 PROBLEM — C50.911 CARCINOMA OF RIGHT BREAST METASTATIC TO LUNG (HCC): Status: ACTIVE | Noted: 2022-01-01

## 2022-04-28 NOTE — PROGRESS NOTES
Consult:  Hematology/Oncology      Referring Physician:Álvaro Valle MD  Primary Care:  LESEA Sanabria    Diagnosis: Metastatic Triple Negative Breast Cancer    Chief Complaint: Metastatic Triple Negative Breast Cancer    History of Presenting Illness:  Ashleigh Marquis is a 61 y.o. postmenopausal white female who had a mammogram in January 2021 which showed a mass in the upper outer quadrant of the right breast.  Biopsy showed triple negative breast cancer grade 3.  On 3/26/2021 she underwent a right lumpectomy and sentinel lymph node biopsy.  Pathology demonstrated a 2.9 cm invasive ductal carcinoma, with 3 sentinel lymph nodes negative but extensive lymph vascular invasion.  The Ki-67 was 80%.  She was seen by Dr. Valle for medical oncology and underwent staging work-up which was negative including CT scan of the chest abdomen pelvis.  He was treated with 4 cycles of TC, mainly due to social issues related to the fact that she was living in a shelter at that time.  She underwent radiation therapy to the right breast by Dr. Russell subsequently.  In January 2022 she had a CT scan of the chest abdomen pelvis which showed a new 1.3 cm nodule subpleurally in the left upper lobe of the lung.  She underwent a biopsy of this lesion which showed metastatic triple negative breast cancer, PD-L1 CPS score of 10%.  She was started on carboplatin gemcitabine day 1, 8 q. 21 and pembrolizumab 400 mg q. 42 days which she tolerated relatively well with some vomiting.  Gemcitabine dose was reduced to 750 mg starting with cycle 2.  She had repeat imaging of the chest on 4/19/2022 demonstrated clinical complete response with disappearance of the subpleural lung nodule.  Cycle 3-day 8 of carboplatin and gemcitabine were held for asymptomatic neutropenia.  Denies any symptoms referable to her metastatic disease.  Does have a complicated psychiatric history and substance abuse history.  She was a previous alcoholic and quit  "drinking in 2020.  She has chronic pain and is managed by a pain specialist Dr. Vigil and his PA Marcelina Harris.  She is now living in her own apartment which has helped.      Past Medical History:   Diagnosis Date   • Alcoholism ([facility])    • Anemia     pt states she doesn't think it is a current issue   • Anxiety    • Anxiety and depression 03/23/2021   • Arthritis     osteo-legs, knees   • ASTHMA     Inhaler as needed.    • Breath shortness     On exertion.    • Bronchitis 01/18/2022    In the past   • Cancer (HCC) 1981    cervical   • Chronic low back pain    • Congestive heart failure (HCC)     1/18/22:  pt states she is not sure.    • Dental disorder     upper and lower dentures. 1/18/22: Pt. denies   • Depression    • EtOH dependence ([facility])    • Fall     alcohol related   • GERD (gastroesophageal reflux disease)    • Heart burn    • HTN    • Hypertension    • Indigestion     GERD   • Muscle disorder    • OSTEOPOROSIS    • Other specified symptom associated with female genital organs     \"I have fibroids bad\"\"   • Pain 03/23/2021    breast, legs, joints   • Pneumonia 01/18/2022    In the past   • Psychiatric disorder    • PTSD (post-traumatic stress disorder)    • Renal disorder     Hx of stones   • Renal failure Around 2019    One time related to heeavily drinking per pt.    • Seizure ([facility])     several years ago r/t alcohol withdrawl   • Seizure ([facility]) 11/03/2020    last seizure was 11/2020   • Ulcer    • Vitamin D deficiency        Past Surgical History:   Procedure Laterality Date   • PB MASTECTOMY, PARTIAL Right 3/26/2021    Procedure: MASTECTOMY, PARTIAL - ESTEBAN LOCALIZED;  Surgeon: Janice Knowles M.D.;  Location: SURGERY SAME DAY [facility];  Service: General   • AXILLARY NODE DISSECTION Right 3/26/2021    Procedure: LYMPHADENECTOMY, AXILLARY.;  Surgeon: Janice Knowles M.D.;  Location: SURGERY SAME DAY [facility];  Service: General   • GASTROSCOPY-ENDO N/A 10/3/2018    Procedure: GASTROSCOPY-ENDO;  Surgeon: " Ben Negro M.D.;  Location: ENDOSCOPY [facility];  Service: Gastroenterology   • CYSTOSCOPY STENT PLACEMENT  11/9/2010    Performed by ANGEL HARRIS at SURGERY [facility]   • URETEROSCOPY  11/9/2010    Performed by ANGEL HARRIS at SURGERY [facility]   • LASERTRIPSY  11/9/2010    Performed by ANGEL HARRIS at SURGERY [facility]   • STENT REMOVAL  11/9/2010    Performed by ANGEL HARRIS at SURGERY [facility]   • CYSTO STENT PLACEMNT PRE SURG  10/7/2010    Performed by ANGEL HARRIS at SURGERY [facility]   • OTHER Left 2010    Leg   • HERNIA REPAIR  1977       Social History     Socioeconomic History   • Marital status:      Spouse name: Not on file   • Number of children: Not on file   • Years of education: Not on file   • Highest education level: Not on file   Occupational History   • Not on file   Tobacco Use   • Smoking status: Current Every Day Smoker     Packs/day: 0.25     Years: 20.00     Pack years: 5.00     Types: Cigarettes   • Smokeless tobacco: Former User     Types: Chew   • Tobacco comment: 1/2 pack per day   Vaping Use   • Vaping Use: Never used   Substance and Sexual Activity   • Alcohol use: Not Currently     Comment: vodka, heavy use-pt stated she is not drinking as of 3/23   • Drug use: Not Currently     Comment: In the past smoked pot   • Sexual activity: Never     Partners: Male   Other Topics Concern   • Not on file   Social History Narrative    ** Merged History Encounter **          Social Determinants of Health     Financial Resource Strain: Not on file   Food Insecurity: Not on file   Transportation Needs: Not on file   Physical Activity: Not on file   Stress: Not on file   Social Connections: Not on file   Intimate Partner Violence: Not on file   Housing Stability: Not on file       Family History   Problem Relation Age of Onset   • Heart Disease Father    • Hypertension Father    • Diabetes Father    • Cancer Paternal Grandmother    •  Depression Other    • Lung Disease Neg Hx    • Stroke Neg Hx        OB History    Para Term  AB Living   5 4     1     SAB IAB Ectopic Molar Multiple Live Births                    # Outcome Date GA Lbr Nakul/2nd Weight Sex Delivery Anes PTL Lv   5 Para            4 Para            3 Para            2 Para            1 AB                Allergies as of 2022 - Reviewed 2022   Allergen Reaction Noted   • Sulfa drugs Vomiting 2009   • Toradol Vomiting 2009   • Aspirin Vomiting 2022         Current Outpatient Medications:   •  oxyCODONE immediate release (ROXICODONE) 10 MG immediate release tablet, Take 10 mg by mouth every 6 hours as needed for Moderate Pain., Disp: , Rfl:   •  morphine ER (MS CONTIN) 15 MG Tab CR tablet, Take 15 mg by mouth every 12 hours., Disp: , Rfl:   •  Naloxone HCl (NARCAN NA), Administer  into affected nostril(S)., Disp: , Rfl:   •  ondansetron (ZOFRAN) 4 MG Tab tablet, Take 1 Tablet by mouth every four hours as needed for Nausea/Vomiting (for nausea, vomiting)., Disp: 30 Tablet, Rfl: 6  •  prochlorperazine (COMPAZINE) 10 MG Tab, Take 1 Tablet by mouth every 6 hours as needed (for nausea, vomiting)., Disp: 30 Tablet, Rfl: 6  •  acetaminophen (TYLENOL) 500 MG Tab, Take 1,000 mg by mouth 2 times a day as needed. Indications: Pain, Disp: , Rfl:   •  omeprazole (PRILOSEC) 20 MG delayed-release capsule, Take 20 mg by mouth every day., Disp: , Rfl:   •  VENTOLIN  (90 Base) MCG/ACT Aero Soln inhalation aerosol, Inhale 2 Puffs 1 time a day as needed., Disp: , Rfl:   •  Multiple Vitamin (MULTIVITAMIN ADULT PO), Take 1 Tablet by mouth every day., Disp: , Rfl:   •  chlorhexidine (PERIDEX) 0.12 % Solution, Take 15 mL by mouth 2 times a day., Disp: , Rfl:   •  flurazepam (DALMANE) 30 MG capsule, Take 30 mg by mouth at bedtime as needed for Sleep., Disp: , Rfl:   •  prochlorperazine (COMPAZINE) 10 MG Tab, Take 10 mg by mouth 2 times a day as needed for  "Nausea/Vomiting., Disp: , Rfl:   •  clonazepam (KLONOPIN) 2 MG tablet, Take 2 mg by mouth 3 times a day as needed., Disp: , Rfl:   •  lisinopril (PRINIVIL) 10 MG Tab, Take 10 mg by mouth every day., Disp: , Rfl:   •  gabapentin (NEURONTIN) 400 MG Cap, Take 400 mg by mouth 3 times a day., Disp: , Rfl:   •  fluoxetine (PROZAC) 40 MG capsule, Take 40 mg by mouth every day., Disp: , Rfl:   •  ondansetron (ZOFRAN ODT) 4 MG TABLET DISPERSIBLE, Take 1 Tab by mouth every 6 hours as needed for Nausea., Disp: 20 Tab, Rfl: 0    Review of Systems:  Review of Systems   Constitutional: Negative.    HENT: Negative.    Eyes: Negative.    Respiratory: Negative.    Cardiovascular: Negative.    Gastrointestinal: Positive for vomiting.   Genitourinary: Negative.    Musculoskeletal: Positive for falls and joint pain.   Skin: Negative.    Neurological: Negative.    Endo/Heme/Allergies: Negative.    Psychiatric/Behavioral: Negative.           Physical Exam:  Vitals:    04/28/22 0911   BP: (!) 168/103   Pulse: 92   Resp: 18   Temp: 37 °C (98.6 °F)   TempSrc: Temporal   SpO2: 98%   Weight: 85.9 kg (189 lb 4.2 oz)   Height: 1.6 m (5' 3\")       DESC; KARNOFSKY SCALE WITH ECOG EQUIVALENT: 90, Able to carry on normal activity; minor signs or symptoms of disease (ECOG equivalent 0)        Physical Exam  Constitutional:       Appearance: Normal appearance.   HENT:      Head: Normocephalic.      Mouth/Throat:      Mouth: Mucous membranes are moist.      Pharynx: Oropharynx is clear.   Eyes:      Extraocular Movements: Extraocular movements intact.      Conjunctiva/sclera: Conjunctivae normal.      Pupils: Pupils are equal, round, and reactive to light.   Cardiovascular:      Rate and Rhythm: Normal rate and regular rhythm.      Pulses: Normal pulses.   Pulmonary:      Effort: Pulmonary effort is normal.      Breath sounds: Normal breath sounds.   Chest:   Breasts:      Right: Tenderness present.      Left: Normal.        Comments: Well-healed scar " in the right breast in the upper outer quadrant without nodularity or erythema.  No right axillary or supraclavicular adenopathy is noted.  The left breast is normal.  Abdominal:      General: Abdomen is flat. Bowel sounds are normal.      Palpations: Abdomen is soft. There is no mass.   Musculoskeletal:         General: Normal range of motion.   Skin:     General: Skin is warm.   Neurological:      General: No focal deficit present.      Mental Status: She is alert and oriented to person, place, and time.   Psychiatric:         Mood and Affect: Mood normal.         Behavior: Behavior normal.            Labs:  [facility] on 04/25/2022   Component Date Value Ref Range Status   • Creatinine 04/25/2022 0.65  0.50 - 1.40 mg/dL Final   • WBC 04/25/2022 1.9 (A) 4.8 - 10.8 K/uL Final    Comment: Results confirmed by repeat testing.. This result has been called to TETO  [id number] by KARRIE UMANZOR on 04 25 2022 at 1232, and has been read back.     • RBC 04/25/2022 3.93 (A) 4.20 - 5.40 M/uL Final   • Hemoglobin 04/25/2022 12.6  12.0 - 16.0 g/dL Final   • Hematocrit 04/25/2022 37.7  37.0 - 47.0 % Final   • MCV 04/25/2022 95.9  81.4 - 97.8 fL Final   • MCH 04/25/2022 32.1  27.0 - 33.0 pg Final   • MCHC 04/25/2022 33.4 (A) 33.6 - 35.0 g/dL Final   • RDW 04/25/2022 56.6 (A) 35.9 - 50.0 fL Final   • Platelet Count 04/25/2022 195  164 - 446 K/uL Final   • MPV 04/25/2022 9.5  9.0 - 12.9 fL Final   • Neutrophils-Polys 04/25/2022 35.50 (A) 44.00 - 72.00 % Final   • Lymphocytes 04/25/2022 53.40 (A) 22.00 - 41.00 % Final   • Monocytes 04/25/2022 5.80  0.00 - 13.40 % Final   • Eosinophils 04/25/2022 1.10  0.00 - 6.90 % Final   • Basophils 04/25/2022 2.60 (A) 0.00 - 1.80 % Final   • Immature Granulocytes 04/25/2022 1.60 (A) 0.00 - 0.90 % Final   • Nucleated RBC 04/25/2022 0.00  /100 WBC Final   • Neutrophils (Absolute) 04/25/2022 0.67 (A) 2.00 - 7.15 K/uL Final    Includes immature neutrophils, if present.   • Lymphs (Absolute)  04/25/2022 1.01  1.00 - 4.80 K/uL Final   • Monos (Absolute) 04/25/2022 0.11  0.00 - 0.85 K/uL Final   • Eos (Absolute) 04/25/2022 0.02  0.00 - 0.51 K/uL Final   • Baso (Absolute) 04/25/2022 0.05  0.00 - 0.12 K/uL Final   • Immature Granulocytes (abs) 04/25/2022 0.03  0.00 - 0.11 K/uL Final   • NRBC (Absolute) 04/25/2022 0.00  K/uL Final   • Outpt Infus CBC Comment 04/25/2022 see below   Final    Comment: Per physician request, the automated differential results reported on this  patient have not been verified by a manual method.     • GFR (CKD-EPI) 04/25/2022 101  >60 mL/min/1.73 m 2 Final    Comment: Effective 3/15/2022, estimated Glomerular Filtration Rate  is calculated using race neutral CKD-EPI 2021 equation  per NKF-ASN recommendations.     [facility] on 04/18/2022   Component Date Value Ref Range Status   • TSH 04/18/2022 1.050  0.380 - 5.330 uIU/mL Final    Comment: Reference Range:    Pregnant Females, 1st Trimester  0.050-3.700  Pregnant Females, 2nd Trimester  0.310-4.350  Pregnant Females, 3rd Trimester  0.410-5.180     • Free T-4 04/18/2022 0.93  0.93 - 1.70 ng/dL Final   • WBC 04/18/2022 3.2 (A) 4.8 - 10.8 K/uL Final   • RBC 04/18/2022 3.40 (A) 4.20 - 5.40 M/uL Final   • Hemoglobin 04/18/2022 10.8 (A) 12.0 - 16.0 g/dL Final   • Hematocrit 04/18/2022 33.4 (A) 37.0 - 47.0 % Final   • MCV 04/18/2022 98.2 (A) 81.4 - 97.8 fL Final   • MCH 04/18/2022 31.8  27.0 - 33.0 pg Final   • MCHC 04/18/2022 32.3 (A) 33.6 - 35.0 g/dL Final   • RDW 04/18/2022 54.8 (A) 35.9 - 50.0 fL Final   • Platelet Count 04/18/2022 290  164 - 446 K/uL Final   • MPV 04/18/2022 9.7  9.0 - 12.9 fL Final   • Neutrophils-Polys 04/18/2022 43.80 (A) 44.00 - 72.00 % Final   • Lymphocytes 04/18/2022 30.80  22.00 - 41.00 % Final   • Monocytes 04/18/2022 20.20 (A) 0.00 - 13.40 % Final   • Eosinophils 04/18/2022 3.70  0.00 - 6.90 % Final   • Basophils 04/18/2022 1.20  0.00 - 1.80 % Final   • Immature Granulocytes 04/18/2022  0.30  0.00 - 0.90 % Final   • Nucleated RBC 04/18/2022 0.00  /100 WBC Final   • Neutrophils (Absolute) 04/18/2022 1.40 (A) 2.00 - 7.15 K/uL Final    Includes immature neutrophils, if present.   • Lymphs (Absolute) 04/18/2022 0.99 (A) 1.00 - 4.80 K/uL Final   • Monos (Absolute) 04/18/2022 0.65  0.00 - 0.85 K/uL Final   • Eos (Absolute) 04/18/2022 0.12  0.00 - 0.51 K/uL Final   • Baso (Absolute) 04/18/2022 0.04  0.00 - 0.12 K/uL Final   • Immature Granulocytes (abs) 04/18/2022 0.01  0.00 - 0.11 K/uL Final   • NRBC (Absolute) 04/18/2022 0.00  K/uL Final   • Outpt Infus CBC Comment 04/18/2022 see below   Final    Comment: Per physician request, the automated differential results reported on this  patient have not been verified by a manual method.     • Sodium 04/18/2022 138  135 - 145 mmol/L Final   • Potassium 04/18/2022 3.5 (A) 3.6 - 5.5 mmol/L Final   • Chloride 04/18/2022 103  96 - 112 mmol/L Final   • Co2 04/18/2022 27  20 - 33 mmol/L Final   • Anion Gap 04/18/2022 8.0  7.0 - 16.0 Final   • Glucose 04/18/2022 116 (A) 65 - 99 mg/dL Final   • Bun 04/18/2022 8  8 - 22 mg/dL Final   • Creatinine 04/18/2022 0.57  0.50 - 1.40 mg/dL Final   • Calcium 04/18/2022 9.3  8.5 - 10.5 mg/dL Final   • AST(SGOT) 04/18/2022 30  12 - 45 U/L Final   • ALT(SGPT) 04/18/2022 18  2 - 50 U/L Final   • Alkaline Phosphatase 04/18/2022 135 (A) 30 - 99 U/L Final   • Total Bilirubin 04/18/2022 <0.2  0.1 - 1.5 mg/dL Final   • Albumin 04/18/2022 3.9  3.2 - 4.9 g/dL Final   • Total Protein 04/18/2022 6.4  6.0 - 8.2 g/dL Final   • Globulin 04/18/2022 2.5  1.9 - 3.5 g/dL Final   • A-G Ratio 04/18/2022 1.6  g/dL Final   • Peripheral Smear Review 04/18/2022 see below   Final    Comment: Due to instrument suspect flags, further review of peripheral smear is  indicated on this patient sample. This review may or may not result in  abnormal findings.     • GFR (CKD-EPI) 04/18/2022 104  >60 mL/min/1.73 m 2 Final    Comment: Effective 3/15/2022, estimated  Glomerular Filtration Rate  is calculated using race neutral CKD-EPI 2021 equation  per NKF-ASN recommendations.     [facility] on 04/02/2022   Component Date Value Ref Range Status   • Creatinine 04/02/2022 0.65  0.50 - 1.40 mg/dL Final   • WBC 04/02/2022 3.0 (A) 4.8 - 10.8 K/uL Final   • RBC 04/02/2022 4.05 (A) 4.20 - 5.40 M/uL Final   • Hemoglobin 04/02/2022 12.7  12.0 - 16.0 g/dL Final   • Hematocrit 04/02/2022 37.9  37.0 - 47.0 % Final   • MCV 04/02/2022 93.6  81.4 - 97.8 fL Final   • MCH 04/02/2022 31.4  27.0 - 33.0 pg Final   • MCHC 04/02/2022 33.5 (A) 33.6 - 35.0 g/dL Final   • RDW 04/02/2022 50.1 (A) 35.9 - 50.0 fL Final   • Platelet Count 04/02/2022 186  164 - 446 K/uL Final   • MPV 04/02/2022 9.9  9.0 - 12.9 fL Final   • Neutrophils-Polys 04/02/2022 60.00  44.00 - 72.00 % Final   • Lymphocytes 04/02/2022 27.80  22.00 - 41.00 % Final   • Monocytes 04/02/2022 7.30  0.00 - 13.40 % Final   • Eosinophils 04/02/2022 3.30  0.00 - 6.90 % Final   • Basophils 04/02/2022 1.30  0.00 - 1.80 % Final   • Immature Granulocytes 04/02/2022 0.30  0.00 - 0.90 % Final   • Nucleated RBC 04/02/2022 0.00  /100 WBC Final   • Neutrophils (Absolute) 04/02/2022 1.81 (A) 2.00 - 7.15 K/uL Final    Includes immature neutrophils, if present.   • Lymphs (Absolute) 04/02/2022 0.84 (A) 1.00 - 4.80 K/uL Final   • Monos (Absolute) 04/02/2022 0.22  0.00 - 0.85 K/uL Final   • Eos (Absolute) 04/02/2022 0.10  0.00 - 0.51 K/uL Final   • Baso (Absolute) 04/02/2022 0.04  0.00 - 0.12 K/uL Final   • Immature Granulocytes (abs) 04/02/2022 0.01  0.00 - 0.11 K/uL Final   • NRBC (Absolute) 04/02/2022 0.00  K/uL Final   • Outpt Infus CBC Comment 04/02/2022 see below   Final    Comment: Per physician request, the automated differential results reported on this  patient have not been verified by a manual method.     • GFR (CKD-EPI) 04/02/2022 101  >60 mL/min/1.73 m 2 Final    Comment: Effective 3/15/2022, estimated Glomerular Filtration  Rate  is calculated using race neutral CKD-EPI 2021 equation  per NKF-ASN recommendations.         Imaging:   All listed images below have been independently reviewed by me. I agree with the findings as summarized below:    DX-KNEE 3 VIEWS LEFT    Result Date: 4/11/2022 4/11/2022 4:12 PM HISTORY/REASON FOR EXAM: . Recent injury. Pain. History of metastatic disease. TECHNIQUE/EXAM DESCRIPTION AND NUMBER OF VIEWS:  3 views of the LEFT knee. COMPARISON: None FINDINGS: Mild osteopenia. No acute fracture identified. Small knee joint effusion. Intramedullary nail is located within the proximal left tibia.     1.  No acute fracture of the left knee identified. No cortical bone destruction. 2.  No knee joint effusion.    DX-TIBIA AND FIBULA LEFT    Result Date: 4/11/2022 4/11/2022 4:20 PM HISTORY/REASON FOR EXAM: . Left knee pain. Left tib-fib pain history of metastatic disease. TECHNIQUE/EXAM DESCRIPTION AND NUMBER OF VIEWS:  2 views of the LEFT tibia and fibula. COMPARISON: None FINDINGS: There is intramedullary nail fixation of the left tibia. Hardware appears intact. There is an old fracture of the distal tibial diaphysis. There is plate and screw fixation of the mid to distal left fibula. Where appears intact. There is an old fracture of the distal left fibular diaphysis. Mild osteopenia. No cortical bone destruction or periosteal reaction identified.     Old fractures of the distal left tibia and fibula and orthopedic fixation. No acute fracture or bony destruction identified.       Pathology:   SURGICAL PATHOLOGY CONSULTATION                                  ** ADDENDUM **                             (SEE SUPPLEMENT A&B)   ADDENDUM:     This case is being addended to report the results of PD-L1 22C3   FDA(KEYTRUDA) for   TNBC(Breast), performed by "[employer] on block A2 and the   results of Histology Image Analysis, Global HER2 Breast IHC, performed   by "[employer] on block A1.     PD-L1  22C3 FDA(KEYTRUDA) for TNBC(Breast): CPS>/=10 (PD-L1 EXPRESSION)   Combined Positive score: 10     HER2 Breast: NEGATIVE   Score: 1+       Please see separate [facility] reports for further   details.     Addendum Signed By:  Brendan Garcia MD   Addendum Date:  1/31/2022   Original Report Date:  1/25/2022         FINAL DIAGNOSIS:       A. Left upper lobe lung nodule cores x2:          Two core biopsies of lung tissue demonstrating morphologic and           immunohistochemical findings consistent with metastatic breast           ductal carcinoma.          See comment.     Comment: The patient's CT scan of the chest, abdomen and pelvis   performed at [facility] demonstrated a new 13 mm   juxtapleural nodule in the left upper lobe. The patient has a recent   history of a right breast invasive ductal carcinoma, grade 3. A slide   from the patient's previous right breast invasive ductal carcinoma is   reviewed ([id number]). The patient's previous invasive ductal carcinoma   morphologically resembles the metastatic breast ductal carcinoma noted   within the left upper lobe lung nodule. In addition, the   immunohistochemical findings support metastatic breast ductal carcinoma   to the lung. The slides are reviewed with Dr. Santiago with agreement   on the interpretation. Pertinent slides are also reviewed with Dr. Ambrosio   with agreement on the interpretation. The results of HER-2/shawna analysis   are reported in an addendum report. Tissue blocks A1 and A2 both have   adequate tumor cells for additional ancillary studies if requested.                                           Diagnosis performed by:                                       BRENDAN GARCIA MD       Impression:  1.  Metastatic triple negative breast cancer with oligometastatic disease-solitary lung nodule, PD-L1 CPS score of 10% with complete clinical response by imaging to carboplatinum/gemcitabine/pembrolizumab.  2. stage II, grade 3 triple  negative breast cancer of the right breast (pT2, PN 0), Ki-67 80% diagnosed 3/2021 status post lumpectomy, adjuvant TC x4 chemotherapy and radiation therapy to the whole breast  3.  Chronic pain syndrome on narcotics  4.  Complex psychosocial history  5.  Asymptomatic neutropenia from chemotherapy requiring dose delay  6.  Vomiting from chemotherapy, will add fosaprepitant to her regimen.    Plan: Going to complete 6 cycles of carboplatin/gemcitabine and continue pembrolizumab indefinitely.  The results of her CT scans were explained in detail.  She understands that she has stage IV disease that is currently in remission but this does not translate into a cure except in very unusual circumstances.  However her prognosis is relatively good given the fact that she had oligometastatic disease and an early complete response to therapy.  We will plan to follow her with imaging every 3 to 4 months.  I will see her back in 3 weeks.    Any questions and concerns raised by the patient were answered to the best of my ability. Thank you for allowing me to participate in the care for this patient. Please feel free to contact me for any questions or concerns.

## 2022-05-05 NOTE — PROGRESS NOTES
Medication Management [address]  [phone] (phone)  [phone] (fax)    Ms. Robert Ernst is a 70 y.o.  female with history of Afib who presents today for anticoagulation monitoring and adjustment. Patient verifies current dosing regimen and tablet strength. No missed or extra doses. Patient denies s/s bruising/swelling/SOB/chest pain - some bleeding week around gum line, states this is likely due to harsh mouthwash. No blood in urine or stool. No dietary changes. No changes in medication/OTC agents/Herbals. No change in alcohol use or tobacco use. No change in activity level. Patient denies headaches/dizziness/lightheadedness/falls. No vomiting/diarrhea or acute illness. States scheduled for thyroid surgery 6/23. Dr Dayne Waller approved 5 day hold of Coumadin, clearance document in media tab. Assessment:   Lab Results   Component Value Date    INR 1.90 (H) 05/05/2022    INR 3.00 (H) 04/18/2022    INR 3.00 (H) 04/04/2022     INR subtherapeutic   Recent Labs     05/05/22  1400   INR 1.90*         Plan:  Continue Coumadin 4mg M and 6mg [id number]. Recheck INR in 3 week(s). Patient reminded to call the Anticoagulation Clinic with any signs or symptoms of bleeding or with any medication changes. Patient given instructions utilizing the teach back method. After visit summary printed and reviewed with patient. Discharged ambulatory in no apparent distress.       For Pharmacy Admin Tracking Only     Time Spent (min): 20 Returns for Taxotere/Cytoxan.  Thinks it is the last tx before XRT.  Encouraged to confirm with MD as we will as well.  Also asking for RD as needs more food supplements as reports place she is staying does not have good food.  Informed pt they are off for the holiday and will forward message for appt tomorrow. Also reporting discomfort in R breast medial to incision, observed site, no redness noted, but pain worse when pt touches area.  Will review with MD and knows she needs to reschedule pain management doctor as her ride did not show up for that appt   Lab drawn and sent, but extended wait for results.  Pt having apprehension over timing as ride arrives at 1815.  Premeds and chemo given as quickly as possible with safety at forefront with finishing just as ride arrived.  DC to care of .

## 2022-05-06 NOTE — PROGRESS NOTES
"Pharmacy Chemotherapy Verification  Patient Name: Ashleigh Marquis   Dx: invasive ductal carcinoma  Protocol: TC       Regimen:   Docetaxel (Taxotere) 75 mg/m2 IV over 30 min Day 1  Cyclophosphamide 600 mg/m2 IV over 30 minutes Day 1   Q21day cycles x4 cycles  *Dosing Reference*  Docetaxel With Cyclophosphamide Is Associated With an Overall Survival Benefit Compared With Doxorubicin and Cyclophosphamide: 7-Year Follow-Up of  Oncology Research Trial 9735.    Allergies:  Sulfa drugs and Toradol     /61   Pulse 90   Temp 36.4 °C (97.5 °F) (Temporal)   Resp 18   Ht 1.62 m (5' 3.78\")   Wt 86.3 kg (190 lb 4.1 oz)   LMP 11/02/2015   SpO2 99%   BMI 32.88 kg/m²  Body surface area is 1.97 meters squared.    Labs:  5/17/21  Meet treatment parameters    ER/MD neg  HER2 1+     Drug Order   (Drug name, dose, route, IV Fluid & volume, frequency, number of doses) Cycle 2     Previous treatment: 4/26/21      Medication = DOCEtaxel (Taxotere)  Base Dose= 75 mg/m2  Calc Dose: Base Dose x 1.97 m2 = 147.75 mg  Final Dose = 147.8 mg  Route = IV  Fluid & Volume =  mL  Final [conc] ~ 0.57 mg/mL   Admin Duration = Over 60 minutes            Okay to treat with final dose   Medication = Cyclophosphamide (Cytoxan)  Base Dose= 600 mg/m2  Calc Dose: Base Dose x 1.97 m2 = 1182 mg  Final Dose = 1182 mg  Route = IV  Fluid & Volume =  mL  Admin Duration = Over 30 minutes            Okay to treat with final dose       " Lipitor

## 2022-05-09 NOTE — PROGRESS NOTES
"Pharmacy Chemotherapy Verification    DX: Breast CA Stage IIA ER-, [place]-, HER2-    Cycle 4, Day 1 (C3D8 cancelled due to neutropenia)   Previous treatment = C3D1 4/18/22    Regimen: Pembrolizumab + CARBOplatin + Gemcitabine  Pembrolizumab 400 mg IV over 30 minutes on Day 1  42-day cycle until DP or UT or until up to 24 months of therapy has been completed concurrent with  CARBOplatin AUC 2 IV over 30 minutes on Days 1 and 8  Gemcitabine 1000 mg/m2 IV over 30 minutes on Days 1 and 8   -3/26/22, starting with C2, dose reduced by Tori MCCARTHY to 750 mg/m2    21-day cycle until DP or UT  NCCN Guidelines for Breast Cancer V.2.2022.  Moncho J, et al. Lancet. 2020;396(73295);9851-0259.  Risa MCCORMICK, et al. Eur J Cancer. 2020;131;68-75.    Allergies: Sulfa drugs, Toradol, and Aspirin  /81   Pulse 81   Temp 36.4 °C (97.6 °F) (Temporal)   Resp 18   Ht 1.62 m (5' 3.78\")   Wt 85.2 kg (187 lb 13.3 oz)   LMP 11/02/2015   SpO2 96%   BMI 32.46 kg/m²   Body surface area is 1.96 meters squared.     Labs 5/9/22:  ANC~ 2450 Plt = 306k   Hgb = 12.4     SCr = 0.59 mg/dL CrCl ~ 116 mL/min (Minimum SCr of 0.7 used)  LFT's = 26/13/132 TBili = 0.2     CARBOplatin (Paraplatin) AUC 2 (116 mL/min + 25) = 282 mg   <10% difference, okay to treat with final dose = 282 mg IV    Gemcitabine (Gemzar) 750 mg/m2 x 1.96 m2 = 1470 mg   <10% difference, okay to treat with final dose = 1470 mg IV    Kade Clements, PharmD  "

## 2022-05-09 NOTE — PROGRESS NOTES
Chemotherapy Verification - SECONDARY RN     D1C4    Height = 162cm  Weight = 85.2kg  BSA = 1.96m^2       Medication: Carboplatin (Paraplatin)  Dose: AUC 2  Calculated Dose: 282.779mg                                 (In mg/m2, AUC, mg/kg)     Medication: Gemcitabine (Gemzar)  Dose: 750mg/m^2  Calculated Dose: 1470mg                            Carboplatin calculation (if applicable):  (((140-59)*[id number])*(0.7+(1*0.15))/(0.7*72)+25)*2*((1=1)+(1=2)) = 282.779    I confirm that this process was performed independently.

## 2022-05-09 NOTE — PROGRESS NOTES
Chemotherapy Verification - PRIMARY RN    C4 D1    Height = 162 cm  Weight = 85.2 kg  BSA = 1.96 m^2       Medication: Gemzar  Dose: 750 mg/m^2  Calculated Dose: 1,470 mg                             (In mg/m2, AUC, mg/kg)     Medication: Carboplatin  Dose: AUC 2  Calculated Dose: 282.78 mg                             (In mg/m2, AUC, mg/kg)    Carboplatin calculation (if applicable):  (((140-59)*85.2)*(0.7+(1*0.15))/(0.7*72)+25)*2*((1=1)+(1=2)) = 282.779      I confirm this process was performed independently with the BSA and all final chemotherapy dosing calculations congruent.  Any discrepancies of 10% or greater have been addressed with the chemotherapy pharmacist. The resolution of the discrepancy has been documented in the EPIC progress notes.

## 2022-05-09 NOTE — PROGRESS NOTES
"Pharmacy Chemotherapy Calculation    Dx: metastatic Breast Cancer, triple negative    Protocol: Pembrolizumab + CARBOplatin/Gemcitabine      Pembrolizumab 400 mg IV over 30 minutes on Day 1    42 day cycle until DP or UT or until up to 24 months of therapy has been completed   CARBOplatin AUC 2 IV over 30 minute on Days 1 and 8   Gemcitabine   IV over 30 minutes on Days 1 and 8   NOTE 3/26/22: Dose reduced to 750 mg/m² with Cycle 2 Day 1 per Dr. Valle    21 day cycle until DP or UT  NCCN Guidelines for Breast Cancer V.2.2022.   Moncho LINDSAY, et al. Lancet. 2020;396(51185): 4861-2103.  Risa M, et al. Eur J Cancer. 2020;131:68-75.    Allergies:  Sulfa drugs, Toradol, and Aspirin     /81   Pulse 81   Temp 36.4 °C (97.6 °F) (Temporal)   Resp 18   Ht 1.62 m (5' 3.78\")   Wt 85.2 kg (187 lb 13.3 oz)   LMP 11/02/2015   SpO2 96%   BMI 32.46 kg/m²  Body surface area is 1.96 meters squared.    Labs from 5/9/22 reviewed - all within treatment parameters. Used min SCr 0.7 mg/dL, CrCl ~116 mL/min.     Drug Order   (Drug name, dose, route, IV Fluid & volume, frequency, number of doses) Cycle 4 Day 1   Previous treatment: C3D1 on 4/18/22 (D8 canceled for counts)     Medication = Pembrolizumab (Keytruda)   Base Dose= 400 mg   Fixed dose, no calculations required  Final Dose = 400 mg  Route = IV  Fluid & Volume = NS 50 mL  Admin Duration = Over 30 mintues   (Due with odd numbered cycles)      Fixed dose, no calculation required. Okay to treat with final dose.   Medication = CARBOplatin (Paraplatin)   Base Dose= AUC 2   Calc Dose: Base Dose x (116 + 25) = 282 mg  Final Dose = 282 mg  Route = IV  Fluid & Volume =  mL  Admin Duration = Over 30 minutes          <10% difference, okay to treat with final dose   Medication = Gemcitabine (Gemzar)  Base Dose= 750 mg/m²    Calc Dose:Base Dose x 1.96 m² = 1470 mg  Final Dose = 1470 mg  Route = IV  Fluid & Volume =  mL  Admin Duration = Over 30 minutes          <10% " difference, okay to treat with final dose     By my signature below, I confirm this process was performed independently with the BSA and all final chemotherapy dosing calculations congruent. I have reviewed the above chemotherapy order and that my calculation of the final dose and BSA (when applicable) corroborate those calculations of the  pharmacist. Discrepancies of 10% or greater in the written dose have been addressed and documented within the EPIC Progress notes.    Vibha Manuel, PharmD, BCOP

## 2022-05-10 NOTE — DISCHARGE PLANNING
Medical Social Work    05/09/2022 at 1940    MSW received a call from [facility] with [facility].  Roopa states that they have been trying to arrange a ride for pt to return home via [facility] for several hours without success.  [facility]fusion Center is about to close; therefor, cab voucher (# [id number]) was tubed to station 10 for [facility] to provide to pt.

## 2022-05-10 NOTE — PROGRESS NOTES
Assumed care from Roopa. Gemzar infused with no s/s of infusion reaction. PIV flushed with + blood return and removed. Gauze and coban applied as a dressing. Patient has future appointment. Patient to home in care of self.

## 2022-05-10 NOTE — PROGRESS NOTES
Pt arrives to [facility] for cycle 4 day 1 of Gemzar/Carboplatin.  Discussed plan of care with pt.  Pt denies s/sx of infection.  PIV established to L- by TETO Rosario.  Labs drawn via PIV.  Labs reviewed and results meet parameters for treatment.  PIV flushed with NS and pt reports site is painful.  PIV site removed.  New PIV site placed via ultrasound guided to L-FA by EDUARDA IRENE.  Pre-medications given.  Carboplatin infused without adverse reaction.  Gemzar infusing at this time.  Report given to TETO Pedraza to assume care of pt.

## 2022-05-12 NOTE — PROGRESS NOTES
On May 12, 2022, Oncology Social Worker Kenzie Lay attempted telephone contact with pt.  OSW Alireza left voicemail message for pt. requesting pt. call back at earliest convenience.  OSW Alireza left contact information in voicemail message.

## 2022-05-15 NOTE — PROGRESS NOTES
"Pharmacy Chemotherapy Calculation    Dx: metastatic Breast Cancer, triple negative    Protocol: Pembrolizumab + CARBOplatin/Gemcitabine      Pembrolizumab 400 mg IV over 30 minutes on Day 1    42 day cycle until DP or UT or until up to 24 months of therapy has been completed   CARBOplatin AUC 2 IV over 30 minute on Days 1 and 8   Gemcitabine   IV over 30 minutes on Days 1 and 8   NOTE 3/26/22: Dose reduced to 750 mg/m² with Cycle 2 Day 1 per Dr. Valle    21 day cycle until DP or UT  NCCN Guidelines for Breast Cancer V.2.2022.   Moncho LINDSAY, et al. Lancet. 2020;396(67919): 1659-3832.  Risa M, et al. Eur J Cancer. 2020;131:68-75.    Allergies:  Sulfa drugs, Toradol, and Aspirin     BP (!) 99/68   Pulse (!) 101   Temp 37.1 °C (98.8 °F) (Temporal)   Resp 18   Ht 1.62 m (5' 3.78\")   Wt 84.5 kg (186 lb 4.6 oz)   LMP 11/02/2015   SpO2 97%   BMI 32.20 kg/m²  Body surface area is 1.95 meters squared.    Labs from 5/16/22 reviewed - all within treatment parameters  Scr = 0.75 Est crcl ~ 108mL/min      Drug Order   (Drug name, dose, route, IV Fluid & volume, frequency, number of doses) Cycle 4 Day 8   Previous treatment: C4D1 on 5/9/22   Medication = Pembrolizumab (Keytruda)   Base Dose= 400 mg   Fixed dose, no calculations required  Final Dose = 400 mg  Route = IV  Fluid & Volume = NS 50 mL  Admin Duration = Over 30 mintues   (Due with odd numbered cycles)      Fixed dose, no calculation required. Okay to treat with final dose.   Medication = CARBOplatin (Paraplatin)   Base Dose= AUC 2   Calc Dose: Base Dose x (108 + 25) = 266mg  Final Dose = 266 mg  Route = IV  Fluid & Volume =  mL  Admin Duration = Over 30 minutes          <10% difference, okay to treat with final dose   Medication = Gemcitabine (Gemzar)  Base Dose= 750 mg/m²    Calc Dose:Base Dose x 1.95 m² = 1462.5 mg  Final Dose = 1463mg  Route = IV  Fluid & Volume =  mL  Admin Duration = Over 30 minutes          <10% difference, okay to treat with " final dose     By my signature below, I confirm this process was performed independently with the BSA and all final chemotherapy dosing calculations congruent. I have reviewed the above chemotherapy order and that my calculation of the final dose and BSA (when applicable) corroborate those calculations of the  pharmacist. Discrepancies of 10% or greater in the written dose have been addressed and documented within the EPIC Progress notes.    Leela Torres, PharmD

## 2022-05-16 NOTE — PROGRESS NOTES
Pt presents to [facility] for gemcitabine and carboplatin. Established PIV in L forearm; brisk blood return observed and pt tolerated well. Labs drawn and within treatable parameters. Pre meds administered and gemcitabine and cisplatin infused with no s/s of adverse reactions. PIV flushed and brisk blood return observed before removing; gauze/coband dressing applied. Next appointment confirmed and education provided. Pt discharged to self care with all personal belongings and in NAD.

## 2022-05-16 NOTE — PROGRESS NOTES
Chemotherapy Verification - PRIMARY RN      Height = 1.62 m  Weight = 84.5 kg  BSA = 1.95 m^2       Medication: gemcitabine  Dose: 750 mg/m^2  Calculated Dose: 1462.5 mg                             (In mg/m2, AUC, mg/kg)     Medication: carboplatin  Dose: AUC=2  Calculated Dose: 264.198 mg                            (In mg/m2, AUC, mg/kg)    Carboplatin calculation (if applicable):  (2*(107.099+25)) = 264.198      I confirm this process was performed independently with the BSA and all final chemotherapy dosing calculations congruent.  Any discrepancies of 10% or greater have been addressed with the chemotherapy pharmacist. The resolution of the discrepancy has been documented in the EPIC progress notes.

## 2022-05-16 NOTE — PROGRESS NOTES
"Pharmacy Chemotherapy Verification    DX: Breast CA Stage IIA ER-, [place]-, HER2-    Cycle 4, Day 8  Previous treatment = C4D1 on 5/9/22    Regimen: Pembrolizumab + CARBOplatin + Gemcitabine  Pembrolizumab 400 mg IV over 30 minutes on Day 1  42-day cycle until DP or UT or until up to 24 months of therapy has been completed concurrent with  CARBOplatin AUC 2 IV over 30 minutes on Days 1 and 8  Gemcitabine 1000 mg/m2 IV over 30 minutes on Days 1 and 8   -3/26/22, starting with C2, dose reduced by Tori MCCARTHY to 750 mg/m2    21-day cycle until DP or UT  NCCN Guidelines for Breast Cancer V.2.2022.  Moncho J, et al. Lancet. 2020;396(59836);7093-1951.  Risa MCCORMICK, et al. Eur J Cancer. 2020;131;68-75.    Allergies: Sulfa drugs, Toradol, and Aspirin  BP (!) 99/68   Pulse (!) 101   Temp 37.1 °C (98.8 °F) (Temporal)   Resp 18   Ht 1.62 m (5' 3.78\")   Wt 84.5 kg (186 lb 4.6 oz)   LMP 11/02/2015   SpO2 97%   BMI 32.20 kg/m²   Body surface area is 1.95 meters squared.     Labs 5/16/22:  ANC~ 1700 Plt = 185k   Hgb = 12.6     SCr = 0.75 mg/dL CrCl ~ 108 mL/min     CARBOplatin (Paraplatin) AUC 2 (108 mL/min + 25) = 266 mg   <10% difference, okay to treat with final dose = 266 mg IV    Gemcitabine (Gemzar) 750 mg/m2 x 1.95 m2 = 1462.5 mg   <10% difference, okay to treat with final dose = 1463 mg IV    Kade Clements, PharmD  "

## 2022-05-16 NOTE — TELEPHONE ENCOUNTER
"Patient called - states that at her last visit with Dr. Griffith he said he would prescribe a new nausea medication -  \"he never did that\"    She would like the nausea medication called in to [facility] (on the corner of [facility]).    Patient is also asking when her next appointment with Dr. Bailey is.   (Nothing has been scheduled yet).    "

## 2022-05-17 NOTE — PROGRESS NOTES
Chemotherapy Verification - SECONDARY RN       Height = 162 cm  Weight = 84.5 kg  BSA = 1.95 m2       Medication: Carboplatin  Dose: AUC 2  Calculated Dose: 264 mg                             (In mg/m2, AUC, mg/kg)     Medication: Gemzar  Dose: 750 mg/m2  Calculated Dose: 1462.5 mg                             (In mg/m2, AUC, mg/kg)    Carboplatin calculation (if applicable):  (2*(107+25)) = 264    I confirm that this process was performed independently.

## 2022-05-17 NOTE — TELEPHONE ENCOUNTER
Spoke with pt in length regarding management of nausea and vomiting, discussing alternating her compazine and zofran every three hours. Pt appeared confused at times, but states she found those meds in her cabinet, and she will start on this regimen to see if this helps alleviate her nausea. Pt advised to contact office with any further concerns, questions and/or worsening symptoms and she has verbalized understanding at this time.

## 2022-05-30 NOTE — PROGRESS NOTES
Chemotherapy Verification - PRIMARY RN      Height = 1.61m  Weight = 88.8kg  BSA = 1.99m2       Medication: pembrolizumab  Dose: flat dose  Calculated Dose: 400mg                             (In mg/m2, AUC, mg/kg)     Medication: carboplatin  Dose: AUC 2  Calculated Dose: 291mg                             (In mg/m2, AUC, mg/kg)    Medication: gemcitabine  Dose: 750mg/m2  Calculated Dose: 1492.5mg                             (In mg/m2, AUC, mg/kg)                              (In mg/m2, AUC, mg/kg)      Carboplatin calculation (if applicable):(2*(120.5+25)) = 291        I confirm this process was performed independently with the BSA and all final chemotherapy dosing calculations congruent.  Any discrepancies of 10% or greater have been addressed with the chemotherapy pharmacist. The resolution of the discrepancy has been documented in the EPIC progress notes.

## 2022-05-30 NOTE — TELEPHONE ENCOUNTER
Notified per infusion RN of 3+ BLE edema, 10 pound weight gain since 5/16    Review of records indicate h/o leg swelling; last echo 7/21/28 LVEF 60%; EKG 11/3/20 shows sinus tach, prob prev infarct; on HTN meds but no diuretic    There were no vitals filed for this visit.  To date, mildly hypertensive per flowsheets review    Labs results for treatment today remain pending but review of previous results indicate stable renal function and albumin    She will be provided Lasix 20 mg today at infusion and evaluate results, office RN to follow up in the next few days and we can provide short course of Lasix as further indicated

## 2022-05-30 NOTE — PROGRESS NOTES
Chemotherapy Verification - SECONDARY RN   Cycle 5 Day 1    Height = 161 cm  Weight = 88.8 kg  BSA = 1.99 m^2       Medication: pembrolizumab (KEYTRUDA)  Dose: 400 mg set dose  Calculated Dose: 400 mg set dose                             (In mg/m2, AUC, mg/kg)     Medication: carboplatin (PARAPLATIN) Dose: target AUC = 2  Calculated Dose: 291.064 mg (In mg/m2, AUC, mg/kg)    Medication: gemcitabine (GEMZAR)  Dose: 750 mg/m2 Calculated Dose: 1492.5 mg                            (In mg/m2, AUC, mg/kg)    Carboplatin calculation (if applicable): (2*(120.532+25)) = 291.064    I confirm that this process was performed independently.

## 2022-05-30 NOTE — PROGRESS NOTES
"Pharmacy Chemotherapy Verification    DX: Breast CA Stage IIA ER-, [place]-, HER2-    Cycle 5, Day 1  Previous treatment = C4D8 on 5/16/22    Regimen: Pembrolizumab + CARBOplatin + Gemcitabine  Pembrolizumab 400 mg IV over 30 minutes on Day 1  42-day cycle until DP or UT or until up to 24 months of therapy has been completed concurrent with  CARBOplatin AUC 2 IV over 30 minutes on Days 1 and 8  Gemcitabine 1000 mg/m2 IV over 30 minutes on Days 1 and 8   -3/26/22, starting with C2, dose reduced by Tori MCCARTHY to 750 mg/m2    21-day cycle until DP or UT  NCCN Guidelines for Breast Cancer V.2.2022.  Moncho J, et al. Lancet. 2020;396(98368);6972-4873.  Risa MCCORMICK, et al. Eur J Cancer. 2020;131;68-75.    Allergies: Sulfa drugs, Toradol, and Aspirin  BP (!) 144/77   Pulse 88   Temp 35.9 °C (96.6 °F) (Temporal)   Resp 18   Ht 1.61 m (5' 3.39\")   Wt 88.8 kg (195 lb 12.3 oz)   LMP 11/02/2015   SpO2 98%   BMI 34.26 kg/m²   Body surface area is 1.99 meters squared.     Labs 5/30/22:  ANC~ 1290 Plt = 152k   Hgb = 10.9     SCr = 0.63 mg/dL CrCl ~ 121 mL/min (Minimum SCr of 0.7 used)  AST/ALT/ALK = 29/16/140 TBili = 0.2   TSH = 2.32 Free T4 = 0.98    Pembrolizumab (Keytruda) 400 mg fixed dose    No calculation required, okay to treat with final dose = 400 mg IV    CARBOplatin (Paraplatin) AUC 2 (121 mL/min + 25) = 292 mg   <10% difference, okay to treat with final dose = 292 mg IV    Gemcitabine (Gemzar) 750 mg/m2 x 1.99 m2 = 1492.5 mg   <10% difference, okay to treat with final dose = 1493 mg IV    Kade Clements, PharmD  "

## 2022-05-30 NOTE — PROGRESS NOTES
"Pharmacy Chemotherapy Calculation    Dx: metastatic Breast Cancer, triple negative    Protocol: Pembrolizumab + CARBOplatin/Gemcitabine      Pembrolizumab 400 mg IV over 30 minutes on Day 1    42 day cycle until DP or UT or until up to 24 months of therapy has been completed   CARBOplatin AUC 2 IV over 30 minute on Days 1 and 8   Gemcitabine   IV over 30 minutes on Days 1 and 8   NOTE 3/26/22: Dose reduced to 750 mg/m² with Cycle 2 Day 1 per Dr. Valle    21 day cycle until DP or UT  NCCN Guidelines for Breast Cancer V.2.2022.   Moncho LINDSAY, et al. Lancet. 2020;396(76713): 9266-6789.  Risa M, et al. Eur J Cancer. 2020;131:68-75.    Allergies:  Sulfa drugs, Toradol, and Aspirin     BP (!) 144/77   Pulse 88   Temp 35.9 °C (96.6 °F) (Temporal)   Resp 18   Ht 1.61 m (5' 3.39\")   Wt 88.8 kg (195 lb 12.3 oz)   LMP 11/02/2015   SpO2 98%   BMI 34.26 kg/m²  Body surface area is 1.99 meters squared.    Labs from 5/30/22 reviewed - all within treatment parameters. Used min SCr 0.7 mg/dL, CrCl ~121 mL/min     Drug Order   (Drug name, dose, route, IV Fluid & volume, frequency, number of doses) Cycle 5 Day 1   Previous treatment: C4D8 on 5/16/22   Medication = Pembrolizumab (Keytruda)   Base Dose= 400 mg fixed dose   Fixed dose, no calculations required  Final Dose = 400 mg  Route = IV  Fluid & Volume = NS 50 mL  Admin Duration = Over 30 mintues   (Due with odd numbered cycles)      Fixed dose, no calculation required. Okay to treat with final dose.   Medication = CARBOplatin (Paraplatin)   Base Dose= AUC 2   Calc Dose: Base Dose x (121 + 25) = 292 mg  Final Dose = 292 mg  Route = IV  Fluid & Volume =  mL  Admin Duration = Over 30 minutes          <10% difference, okay to treat with final dose   Medication = Gemcitabine (Gemzar)  Base Dose= 750 mg/m²    Calc Dose:Base Dose x 1.99 m² = 1492.5 mg  Final Dose = 1493 mg  Route = IV  Fluid & Volume =  mL  Admin Duration = Over 30 minutes          <10% difference, okay " to treat with final dose     By my signature below, I confirm this process was performed independently with the BSA and all final chemotherapy dosing calculations congruent. I have reviewed the above chemotherapy order and that my calculation of the final dose and BSA (when applicable) corroborate those calculations of the  pharmacist. Discrepancies of 10% or greater in the written dose have been addressed and documented within the EPIC Progress notes.    Vibha Manuel, PharmD, BCOP

## 2022-05-31 NOTE — PROGRESS NOTES
Infusion complete, tolerated well. Removed IV intact, wrapped in gauze and cobain. Pt was discharged home in stable condition. Automated [employer] call, confirmed they will pick her up in front of the cancer center and take her home.

## 2022-05-31 NOTE — PROGRESS NOTES
Patient to [facility] for chemotherapy infusion. PIV inserted into left wrist, flushed with + blood return, labs drawn. Patient meets parameters for infusion. Premeds given. Keytruda infused with no s/s of infusion reaction. Carboplatin infused with no s/s of infusion reaction. Gemzar infusing. Report given to Trevor.

## 2022-05-31 NOTE — TELEPHONE ENCOUNTER
"Per request of APRN, called pt to f/u regarding whether use of lasix helped or not.  Pt reports that she urinated a lot yesterday, but edema is still present.  Pt states her legs & feet are still swollen & hurt & hasn't voided much today.  Pt reports she has drank several cups of coffee & water this morning so far.  Pt's main complaint at this time is that she is suffering from \"the worst headache she has ever experienced\" with onset as of yesterday.  Pt reports she has taken oxycodone but it has not relieved her discomfort.      Spoke with Priscilla DELEON regarding pt's symptoms with recommendation to increase fluid intake with electrolytes (Gatorade, Powerade, Pedialyte) & to complete a 5-day course of furosemide.  Called pt to relay APRN recommendations, pt agreeable & voiced understanding.  Pt stated no further questions.    E-script sent to [facility] on [address] per pt request.  "

## 2022-06-06 NOTE — PROGRESS NOTES
Ms. Marquis is here today for Carboplatin and Gemzar. Her only complaint today is a headache, not sleeping well. Attempt made to start a PIV, unsuccessful. Contacted ultrasound for assistance, RANDI Luong nurse was unable to find a vein suitable for a PIV. After two attempts at a PIV with US, one was successful to flush but unable to get any blood return. TETO Cotto charge nurse called Ann-Marie Montoya and the decision was made to arrange for placement of medi port and defer treatment today.   Plan was discussed with Ms Marquis and she was in agreement to proceed with scheduling medi port placement. PIV was removed intact. [employer] was called for transportation home, discharged in stable condition.

## 2022-06-06 NOTE — TELEPHONE ENCOUNTER
Patient returned call - states she got the Lidocaine oral solution from Estela Correa (a provider she no longer sees).  She has pain in her mouth, teeth are falling apart, a piece of a tooth broke off.  Can you please have Dr. Bailey write that prescription for her?

## 2022-06-06 NOTE — PROGRESS NOTES
Need for urgent port placement as Infusion RN unable to get a line for cycle 5, day 8 of chemo today.     Urgent port placement has been ordered.  Discussed with Dr. Bailey and he can get port placed by next week then will reschedule day 8 for then.  If not then will delay treatment until cycle 6-day 1.    1. Malignant neoplasm of central portion of right breast in female, estrogen receptor negative (HCC)  IR-CVC PORT PLACEMENT > AGE 5

## 2022-06-06 NOTE — TELEPHONE ENCOUNTER
Called pt to discuss her requests for zofran, compazine and lidocaine mouthwash, pt did not answer. Message left on pt identifiable voicemail at this time to return call.     Called pt pharmacy, [facility] [address], and pharmacy states she has 15 refills of ondansetron and 5 of compazine. Called pt above again to relay she has refills with instructions on how to transfer to a different preferred pharmacy if she so chooses to do so. Instructed pt to call RN back to discuss the mouthwash due to the fact this office has not prescribed her a lidocaine mouthwash yet, would like to know her symptoms.

## 2022-06-06 NOTE — PROGRESS NOTES
"Pharmacy Chemotherapy Verification    DX: Breast CA Stage IIA ER-, [place]-, HER2-    Cycle 5, Day 8 (Deferred for PICC)  Previous treatment = C5D1 on 5/30/22    Regimen: Pembrolizumab + CARBOplatin + Gemcitabine  Pembrolizumab 400 mg IV over 30 minutes on Day 1  42-day cycle until DP or UT or until up to 24 months of therapy has been completed concurrent with  CARBOplatin AUC 2 IV over 30 minutes on Days 1 and 8  Gemcitabine 1000 mg/m2 IV over 30 minutes on Days 1 and 8   -3/26/22, starting with C2, dose reduced by Tori MCCARTHY to 750 mg/m2    21-day cycle until DP or UT  NCCN Guidelines for Breast Cancer V.2.2022.  Ivan J, et al. Lancet. 2020;396(73138);7879-6882.  Risa M, et al. Eur J Cancer. 2020;131;68-75.    Allergies: Sulfa drugs, Toradol, and Aspirin  /80   Pulse 92   Temp 36.7 °C (98 °F) (Temporal)   Resp 18   Ht 1.615 m (5' 3.58\")   Wt 86.9 kg (191 lb 9.3 oz)   LMP 11/02/2015   SpO2 97%   BMI 33.32 kg/m²   Body surface area is 1.97 meters squared.     Labs 5/30/22:  ANC~ 1290 Plt = 152k   Hgb = 10.9     SCr = 0.63 mg/dL CrCl ~ 121 mL/min (Minimum SCr of 0.7 used)  AST/ALT/ALK = 29/16/140 TBili = 0.2   TSH = 2.32 Free T4 = 0.98        Emily Blas, PharmD, BCPS   PGY2 Infectious Diseases Pharmacy Resident    "

## 2022-06-06 NOTE — TELEPHONE ENCOUNTER
"Pt is calling to request refills on her Compazine, Zofran and Lidocaine wash. Pt originally stated she wanted to refill her \"two nausea medications\" and then specified the two names of the medications.   "

## 2022-06-07 NOTE — TELEPHONE ENCOUNTER
2nd attempt to reach pt regarding her request for Lidocaine wash and subsequent c/o teeth falling apart, breaking. Message left to return RN call at this time.

## 2022-06-09 NOTE — TELEPHONE ENCOUNTER
Spoke with pt regarding her ongoing tooth pain, patient states she is going to see a dentist for evaluation.

## 2022-06-09 NOTE — TELEPHONE ENCOUNTER
Pt called with c/o continuing leg edema, requesting Aldactone RX that she had in the past. Spoke with Dr. Bailey at this time and provider has ok'd RX to be re-ordered for pt. Discussed dosing with APRN and order received for Aldactone 25mg po daily. E-script sent in to pt preferred pharmacy, [facility]. Message left on pt identifiable voicemail with the above information.

## 2022-06-10 NOTE — TELEPHONE ENCOUNTER
Pt called from down in the infusion office stating that she was told her port placement had been canceled, however she had not received a call telling her it was rescheduled for another day. Pt did receive a call yesterday with the information to prepare for the surgery such as the appointment check in time and that she needed to fast. Pt has to arrange for transportation and was very frustrated to learn her appointment had been moved and she was not contacted about reschedule. I attempted to see why the appt was rescheduled but was unable to determine what the note meant and told the patient I would reach out to who it was rescheduled by and would let her know what I was able to find out. I informed patient of the new appointment day/time.

## 2022-06-13 NOTE — TELEPHONE ENCOUNTER
Pt called to report worsening BLL edema, states she feels as though the aldactone hasn't worked. Pt states she has been drinking well, her urine is clear and has been elevating her feet.     Above reported to ADRIENNE Childs, and order received for aldactone 25mg PO BID from previous once daily aldactone.     Discussed the new RX with pt and she has verbalized understanding. Follow-up appointment to be scheduled by Chantel to see provider this Thursday prior to restarting her chemotherapy.

## 2022-06-14 NOTE — PROGRESS NOTES
Pt presents to [facility] for tunneled port placement. Pt consented at BS by Dr. Allan. Pt assisted to procedure table and placed on cardiac monitor, SPO2 and NIBP with limits set and alarms audible. Pt placed on 2L NC with end tidal monitoring. Pt prepped and raped in sterile fashion.    1450 1mg versed 50 mcg fentanyl 2 g ancef given IVP     1454 Time out procedure start 0.5mg versed 25 mcg fentanyl given IVP     1458 0.5mg versed 25 mcg fentanyl given IVP     1500 1mg versed 50 mcg fentanyl given IVP     1503 1mg versed 50 mcg fentanyl given IVP     1509 tunneled power port placed in R chest wall. Port sutured in. dermabond steri strips placed. Port hep locked 19g lerma needle to remain in placed until pt chemo therapy on Thursday.     1530 pt moved to PACU rm 2. Pt AAOx4. Pt given snack and water. Tolerating well.     1551 pt d/c to significant other care. Pt ambulated to bathroom and out of department without difficulty. IV d/c vitals WNL.       Power port Clear eduar   LOT [id number]  REF [id number]  Expiration 06-

## 2022-06-14 NOTE — H&P
"  History and Physical    Date: 6/14/2022    PCP: LEESA Griffith      CC: R breast cancer    HPI: This is a 61 y.o. female who is presenting with above    Past Medical History:   Diagnosis Date   • Alcoholism (HCC)    • Anemia     pt states she doesn't think it is a current issue   • Anxiety    • Anxiety and depression 03/23/2021   • Arthritis     osteo-legs, knees   • ASTHMA     Inhaler as needed.    • Breath shortness     On exertion.    • Bronchitis 01/18/2022    In the past   • Cancer (HCC) 1981    cervical   • Chronic low back pain    • Congestive heart failure (HCC)     1/18/22:  pt states she is not sure.    • Dental disorder     upper and lower dentures. 1/18/22: Pt. denies   • Depression    • EtOH dependence ([facility])    • Fall     alcohol related   • GERD (gastroesophageal reflux disease)    • Heart burn    • HTN    • Hypertension    • Indigestion     GERD   • Muscle disorder    • OSTEOPOROSIS    • Other specified symptom associated with female genital organs     \"I have fibroids bad\"\"   • Pain 03/23/2021    breast, legs, joints   • Pneumonia 01/18/2022    In the past   • Psychiatric disorder    • PTSD (post-traumatic stress disorder)    • Renal disorder     Hx of stones   • Renal failure Around 2019    One time related to heeavily drinking per pt.    • Seizure ([facility])     several years ago r/t alcohol withdrawl   • Seizure ([facility]) 11/03/2020    last seizure was 11/2020   • Ulcer    • Vitamin D deficiency        Past Surgical History:   Procedure Laterality Date   • PB MASTECTOMY, PARTIAL Right 3/26/2021    Procedure: MASTECTOMY, PARTIAL - ESTEBAN LOCALIZED;  Surgeon: Janice Knowles M.D.;  Location: SURGERY SAME DAY [facility];  Service: General   • AXILLARY NODE DISSECTION Right 3/26/2021    Procedure: LYMPHADENECTOMY, AXILLARY.;  Surgeon: Janice Knowles M.D.;  Location: SURGERY SAME DAY [facility];  Service: General   • GASTROSCOPY-ENDO N/A 10/3/2018    Procedure: GASTROSCOPY-ENDO;  Surgeon: Ben Negro, " M.D.;  Location: ENDOSCOPY [facility];  Service: Gastroenterology   • CYSTOSCOPY STENT PLACEMENT  11/9/2010    Performed by ANGEL HARRIS at [facility]   • URETEROSCOPY  11/9/2010    Performed by ANGEL HARRIS at [facility]   • LASERTRIPSY  11/9/2010    Performed by ANGEL HARRIS at [facility]   • STENT REMOVAL  11/9/2010    Performed by ANGEL HARRIS at [facility]   • CYSTO STENT PLACEMNT PRE SURG  10/7/2010    Performed by ANGEL HARRIS at [facility]   • OTHER Left 2010    Leg   • HERNIA REPAIR  1977       Current Facility-Administered Medications   Medication Dose Route Frequency Provider Last Rate Last Admin   • NS (BOLUS) infusion 500 mL  500 mL Intravenous Once PRN John Allan M.D.       • fentaNYL (SUBLIMAZE) injection 12.5-50 mcg  12.5-50 mcg Intravenous PRN John Allan M.D.       • midazolam (VERSED) injection 0.5-2 mg  0.5-2 mg Intravenous PRN John Allan M.D.       • ondansetron (ZOFRAN) syringe/vial injection 4 mg  4 mg Intravenous Q6HRS PRN John Allan M.D.       • ceFAZolin in dextrose (Ancef) IVPB premix 2 g  2 g Intravenous Once John Allan M.D.            Social History     Socioeconomic History   • Marital status:      Spouse name: Not on file   • Number of children: Not on file   • Years of education: Not on file   • Highest education level: Not on file   Occupational History   • Not on file   Tobacco Use   • Smoking status: Current Every Day Smoker     Packs/day: 0.25     Years: 20.00     Pack years: 5.00     Types: Cigarettes   • Smokeless tobacco: Former User     Types: Chew   • Tobacco comment: 1/2 pack per day   Vaping Use   • Vaping Use: Never used   Substance and Sexual Activity   • Alcohol use: Not Currently     Comment: kobe heavy use-pt stated she is not drinking as of 3/23   • Drug use: Not Currently     Comment: In the past smoked pot   • Sexual activity: Never      Partners: Male   Other Topics Concern   • Not on file   Social History Narrative    ** Merged History Encounter **          Social Determinants of Health     Financial Resource Strain: Not on file   Food Insecurity: Not on file   Transportation Needs: Not on file   Physical Activity: Not on file   Stress: Not on file   Social Connections: Not on file   Intimate Partner Violence: Not on file   Housing Stability: Not on file       Family History   Problem Relation Age of Onset   • Heart Disease Father    • Hypertension Father    • Diabetes Father    • Cancer Paternal Grandmother    • Depression Other    • Lung Disease Neg Hx    • Stroke Neg Hx        Allergies:  Sulfa drugs, Toradol, and Aspirin    Review of Systems:  Negative     Physical Exam  Constitutional:       General: She is not in acute distress.     Appearance: She is well-developed. She is not diaphoretic.   HENT:      Head: Normocephalic and atraumatic.      Mouth/Throat:      Pharynx: No oropharyngeal exudate.   Eyes:      General: No scleral icterus.        Right eye: No discharge.         Left eye: No discharge.   Cardiovascular:      Rate and Rhythm: Normal rate.   Pulmonary:      Effort: Pulmonary effort is normal.   Abdominal:      Palpations: Abdomen is soft.   Musculoskeletal:      Cervical back: Neck supple.   Skin:     General: Skin is warm and dry.   Neurological:      Mental Status: She is alert and oriented to person, place, and time.   Psychiatric:         Behavior: Behavior normal.         Thought Content: Thought content normal.         Judgment: Judgment normal.           Radiology:  IR-CVC PORT PLACEMENT > AGE 5    (Results Pending)             Assessment and Plan:This is a 61 y.o. here for chest port, therapy thursday

## 2022-06-15 NOTE — PROGRESS NOTES
"Pharmacy Chemotherapy Verification    DX: Breast CA Stage IIA ER-, [place]-, HER2-    Cycle 6 Day 1  Previous treatment = C5D1 on 5/30/22    Regimen: Pembrolizumab + CARBOplatin + Gemcitabine  Pembrolizumab 400 mg IV over 30 minutes on Day 1  42-day cycle until DP or UT or until up to 24 months of therapy has been completed concurrent with  CARBOplatin AUC 2 IV over 30 minutes on Days 1 and 8  Gemcitabine 1000 mg/m2 IV over 30 minutes on Days 1 and 8   -3/26/22, starting with C2, dose reduced by Tori MCCARTHY to 750 mg/m2    21-day cycle until DP or UT  NCCN Guidelines for Breast Cancer V.2.2022.  Ivan J, et al. Lancet. 2020;396(64056);3876-6613.  Risa M, et al. Eur J Cancer. 2020;131;68-75.    Allergies: Sulfa drugs, Toradol, and Aspirin  /82   Pulse 99   Temp 36.3 °C (97.3 °F) (Temporal)   Resp 18   Ht 1.615 m (5' 3.58\")   Wt 86 kg (189 lb 9.5 oz)   LMP 11/02/2015   SpO2 95%   BMI 32.97 kg/m²   Body surface area is 1.96 meters squared.     Labs 6/16/22  ANC~1980 Hgb 11.3 Plt 218k  SCr 0.66 CrCl 116.7 mL/min   AST/ALT/AP = 25/9/158 Tbili = 0.2    Labs 5/30/22  TSH 2.32 Free T 40.98    Pembrolizumab (Keytruda) 400 mg fixed dose, no calculation required   <10% difference, OK to treat with final dose = 0 mg IV    CARBOplatin (Paraplatin) AUC 2 (116.7 mL/min + 25) = 283.4 mg   <10% difference, okay to treat with final dose = 280 mg IV    Gemcitabine (Gemzar) 750 mg/m2 x 1.96 m2 = 1470 mg   <10% difference, okay to treat with final dose = 1470 mg IV    Kandis Delgado, PharmD, BCOP    "

## 2022-06-16 NOTE — PROGRESS NOTES
Ms Marquis is here today for Carboplatin and Gemzar infusion. Pt voices no new concerns or complaints, mild discomfort at medi port site. She reports feeling fatigued. s. Medi port to right chest was placed on 6/14/2022, pt arrived to the unit today with medi port already accessed, flushed well with good blood return. Labs were drawn per orders. Lab results are within parameters for treatment. Pre meds given per MAR and were tolerated well. Carboplatin infusion was tolerated well. Gemzar infusion was tolerated well. PICC line flushed and heparin placed. Removed lerma needle, intact. Skin is clean dry and intact. Minimal bruising to site noted, no s/s of infection. Advised pt to keep area clean and dry and to not pick at or remove the skin glue.

## 2022-06-16 NOTE — PROGRESS NOTES
Chemotherapy Verification - PRIMARY RN      Height = 1.615 m Weight = 86 kg  BSA = 1.96 m2      Medication: Carboplatin   Dose: AUC 2  Calculated Dose: 283.32 mg                            (In mg/m2, AUC, mg/kg)     Medication: Gemzar Dose: 750 mg/m2  Calculated Dose: 1470 mg                            (In mg/m2, AUC, mg/kg)          Carboplatin calculation (if applicable):  (2*(116.66+25)) = 283.32    I confirm this process was performed independently with the BSA and all final chemotherapy dosing calculations congruent.  Any discrepancies of 10% or greater have been addressed with the chemotherapy pharmacist. The resolution of the discrepancy has been documented in the EPIC progress notes.

## 2022-06-16 NOTE — PROGRESS NOTES
Chemotherapy Verification - SECONDARY RN       Height = 1.615m  Weight = 86kg  BSA = 1.96m2       Medication: carboplatin  Dose: AUC 2  Calculated Dose: 283.2mg                             (In mg/m2, AUC, mg/kg)     Medication: gemcitabine  Dose: 750mg/m2  Calculated Dose: 1470mg                             (In mg/m2, AUC, mg/kg)          Carboplatin calculation (if applicable): (2*(116.6+25)) = 283.2     I confirm that this process was performed independently.

## 2022-06-16 NOTE — PROGRESS NOTES
"Pharmacy Chemotherapy Calculation    Dx: metastatic Breast Cancer, triple negative    Protocol: Pembrolizumab + CARBOplatin/Gemcitabine      Pembrolizumab 400 mg IV over 30 minutes on Day 1    42 day cycle until DP or UT or until up to 24 months of therapy has been completed   CARBOplatin AUC 2 IV over 30 minute on Days 1 and 8   Gemcitabine   IV over 30 minutes on Days 1 and 8   NOTE 3/26/22: Dose reduced to 750 mg/m² with Cycle 2 Day 1 per Dr. Valle    21 day cycle until DP or UT  NCCN Guidelines for Breast Cancer V.2.2022.   Moncho LINDSAY, et al. Lancet. 2020;396(00285): 4653-7556.  Risa M, et al. Eur J Cancer. 2020;131:68-75.    Allergies:  Sulfa drugs, Toradol, and Aspirin     /82   Pulse 99   Temp 36.3 °C (97.3 °F) (Temporal)   Resp 18   Ht 1.615 m (5' 3.58\")   Wt 86 kg (189 lb 9.5 oz)   LMP 11/02/2015   SpO2 95%   BMI 32.97 kg/m²  Body surface area is 1.96 meters squared.    Labs from 6/16/22 reviewed - all within treatment parameters. CrCl ~117 mL/min     Drug Order   (Drug name, dose, route, IV Fluid & volume, frequency, number of doses) Cycle 6 Day 1   Previous treatment: C5D1 on 5/30/22 (C5D8 cancelled)   Medication = Pembrolizumab (Keytruda)   Base Dose= 400 mg fixed dose   Fixed dose, no calculations required  Final Dose = 400 mg  Route = IV  Fluid & Volume = NS 50 mL  Admin Duration = Over 30 mintues   (Due with odd numbered cycles)      Fixed dose, no calculation required. Okay to treat with final dose.   Medication = CARBOplatin (Paraplatin)   Base Dose= AUC 2   Calc Dose: Base Dose x (117 + 25) = 284 mg  Final Dose = 280 mg  Route = IV  Fluid & Volume =  mL  Admin Duration = Over 30 minutes          <10% difference, okay to treat with final dose   Medication = Gemcitabine (Gemzar)  Base Dose= 750 mg/m²    Calc Dose:Base Dose x 1.96 m² = 1470 mg  Final Dose = 1470 mg  Route = IV  Fluid & Volume =  mL  Admin Duration = Over 30 minutes          <10% difference, okay to treat with " final dose     By my signature below, I confirm this process was performed independently with the BSA and all final chemotherapy dosing calculations congruent. I have reviewed the above chemotherapy order and that my calculation of the final dose and BSA (when applicable) corroborate those calculations of the  pharmacist. Discrepancies of 10% or greater in the written dose have been addressed and documented within the EPIC Progress notes.    Vibha Manuel, PharmD, BCOP

## 2022-06-16 NOTE — PROGRESS NOTES
Cell skilled therapeutic    To tomorrow  Him and then daily 7 minutes had moderate epithelial negative mean she she is a lot of disease in the liver usually quite uncomfortable  So it looks much better is unlikely result was normal this could be reaction to just could be reaction to just fever and well as her ANC negative EKG have reviewed how long panel on this day to get in with me 3 weeks consult:  Hematology/Oncology      Referring Physician:Álvaro Valle MD  Primary Care:  LEESA Sanabria    Diagnosis: Metastatic Triple Negative Breast Cancer    Chief Complaint: Metastatic Triple Negative Breast Cancer    History of Presenting Illness:  Ashleigh Marquis is a 61 y.o. postmenopausal white female who had a mammogram in January 2021 which showed a mass in the upper outer quadrant of the right breast.  Biopsy showed triple negative breast cancer grade 3.  On 3/26/2021 she underwent a right lumpectomy and sentinel lymph node biopsy.  Pathology demonstrated a 2.9 cm invasive ductal carcinoma, with 3 sentinel lymph nodes negative but extensive lymph vascular invasion.  The Ki-67 was 80%.  She was seen by Dr. Valle for medical oncology and underwent staging work-up which was negative including CT scan of the chest abdomen pelvis.  He was treated with 4 cycles of TC, mainly due to social issues related to the fact that she was living in a shelter at that time.  She underwent radiation therapy to the right breast by Dr. Russell subsequently.  In January 2022 she had a CT scan of the chest abdomen pelvis which showed a new 1.3 cm nodule subpleurally in the left upper lobe of the lung.  She underwent a biopsy of this lesion which showed metastatic triple negative breast cancer, PD-L1 CPS score of 10%.  She was started on carboplatin gemcitabine day 1, 8 q. 21 and pembrolizumab 400 mg q. 42 days which she tolerated relatively well with some vomiting.  Gemcitabine dose was reduced to 750 mg starting with cycle 2.   "She had repeat imaging of the chest on 4/19/2022 demonstrated clinical complete response with disappearance of the subpleural lung nodule.  Cycle 3-day 8 of carboplatin and gemcitabine were held for asymptomatic neutropenia.  Denies any symptoms referable to her metastatic disease.  Does have a complicated psychiatric history and substance abuse history.  She was a previous alcoholic and quit drinking in 2020.  She has chronic pain and is managed by a pain specialist Dr. Vigil and his PA Marcelina Harris.  She is now living in her own apartment which has helped.      Past Medical History:   Diagnosis Date   • Alcoholism (HCC)    • Anemia     pt states she doesn't think it is a current issue   • Anxiety    • Anxiety and depression 03/23/2021   • Arthritis     osteo-legs, knees   • ASTHMA     Inhaler as needed.    • Breath shortness     On exertion.    • Bronchitis 01/18/2022    In the past   • Cancer (HCC) 1981    cervical   • Chronic low back pain    • Congestive heart failure ([facility])     1/18/22:  pt states she is not sure.    • Dental disorder     upper and lower dentures. 1/18/22: Pt. denies   • Depression    • EtOH dependence ([facility])    • Fall     alcohol related   • GERD (gastroesophageal reflux disease)    • Heart burn    • HTN    • Hypertension    • Indigestion     GERD   • Muscle disorder    • OSTEOPOROSIS    • Other specified symptom associated with female genital organs     \"I have fibroids bad\"\"   • Pain 03/23/2021    breast, legs, joints   • Pneumonia 01/18/2022    In the past   • Psychiatric disorder    • PTSD (post-traumatic stress disorder)    • Renal disorder     Hx of stones   • Renal failure Around 2019    One time related to heeavily drinking per pt.    • Seizure ([facility])     several years ago r/t alcohol withdrawl   • Seizure ([facility]) 11/03/2020    last seizure was 11/2020   • Ulcer    • Vitamin D deficiency        Past Surgical History:   Procedure Laterality Date   • PB MASTECTOMY, PARTIAL Right 3/26/2021 "    Procedure: MASTECTOMY, PARTIAL - ESTEBAN LOCALIZED;  Surgeon: Janice Knowles M.D.;  Location: SURGERY SAME DAY [facility];  Service: General   • AXILLARY NODE DISSECTION Right 3/26/2021    Procedure: LYMPHADENECTOMY, AXILLARY.;  Surgeon: Janice Knowles M.D.;  Location: SURGERY SAME DAY [facility];  Service: General   • GASTROSCOPY-ENDO N/A 10/3/2018    Procedure: GASTROSCOPY-ENDO;  Surgeon: Ben Negro M.D.;  Location: ENDOSCOPY [facility];  Service: Gastroenterology   • CYSTOSCOPY STENT PLACEMENT  11/9/2010    Performed by ANGLE HARRIS at SURGERY [facility]   • URETEROSCOPY  11/9/2010    Performed by ANGEL HRARIS at SURGERY [facility]   • LASERTRIPSY  11/9/2010    Performed by ANGEL HARRIS at SURGERY [facility]   • STENT REMOVAL  11/9/2010    Performed by ANGEL HARRIS at SURGERY [facility]   • CYSTO STENT PLACEMNT PRE SURG  10/7/2010    Performed by ANGEL HARRIS at SURGERY [facility]   • OTHER Left 2010    Leg   • HERNIA REPAIR  1977       Social History     Socioeconomic History   • Marital status:      Spouse name: Not on file   • Number of children: Not on file   • Years of education: Not on file   • Highest education level: Not on file   Occupational History   • Not on file   Tobacco Use   • Smoking status: Current Every Day Smoker     Packs/day: 0.25     Years: 20.00     Pack years: 5.00     Types: Cigarettes   • Smokeless tobacco: Former User     Types: Chew   • Tobacco comment: 1/2 pack per day   Vaping Use   • Vaping Use: Never used   Substance and Sexual Activity   • Alcohol use: Not Currently     Comment: vodka, heavy use-pt stated she is not drinking as of 3/23   • Drug use: Not Currently     Comment: In the past smoked pot   • Sexual activity: Never     Partners: Male   Other Topics Concern   • Not on file   Social History Narrative    ** Merged History Encounter **          Social Determinants of Health     Financial Resource Strain: Not on file    Food Insecurity: Not on file   Transportation Needs: Not on file   Physical Activity: Not on file   Stress: Not on file   Social Connections: Not on file   Intimate Partner Violence: Not on file   Housing Stability: Not on file       Family History   Problem Relation Age of Onset   • Heart Disease Father    • Hypertension Father    • Diabetes Father    • Cancer Paternal Grandmother    • Depression Other    • Lung Disease Neg Hx    • Stroke Neg Hx        OB History    Para Term  AB Living   5 4     1     SAB IAB Ectopic Molar Multiple Live Births                    # Outcome Date GA Lbr Nakul/2nd Weight Sex Delivery Anes PTL Lv   5 Para            4 Para            3 Para            2 Para            1 AB                Allergies as of 2022 - Reviewed 2022   Allergen Reaction Noted   • Sulfa drugs Vomiting 2009   • Toradol Vomiting 2009   • Aspirin Vomiting 2022         Current Outpatient Medications:   •  spironolactone (ALDACTONE) 25 MG Tab, Take 1 Tablet by mouth 2 times a day., Disp: 60 Tablet, Rfl: 0  •  furosemide (LASIX) 20 MG Tab, Take 1 Tablet by mouth every day., Disp: 5 Tablet, Rfl: 0  •  oxyCODONE immediate release (ROXICODONE) 10 MG immediate release tablet, Take 10 mg by mouth as needed in the morning and 10 mg as needed at noon and 10 mg as needed in the evening and 10 mg as needed before bedtime for Moderate Pain., Disp: , Rfl:   •  morphine ER (MS CONTIN) 15 MG Tab CR tablet, Take 15 mg by mouth every 12 hours., Disp: , Rfl:   •  Naloxone HCl (NARCAN NA), Administer  into affected nostril(S)., Disp: , Rfl:   •  ondansetron (ZOFRAN) 4 MG Tab tablet, Take 1 Tablet by mouth every four hours as needed for Nausea/Vomiting (for nausea, vomiting)., Disp: 30 Tablet, Rfl: 6  •  prochlorperazine (COMPAZINE) 10 MG Tab, Take 1 Tablet by mouth every 6 hours as needed (for nausea, vomiting)., Disp: 30 Tablet, Rfl: 6  •  acetaminophen (TYLENOL) 500 MG Tab, Take 1,000 mg  "by mouth 2 times a day as needed. Indications: Pain, Disp: , Rfl:   •  omeprazole (PRILOSEC) 20 MG delayed-release capsule, Take 20 mg by mouth every day., Disp: , Rfl:   •  VENTOLIN  (90 Base) MCG/ACT Aero Soln inhalation aerosol, Inhale 2 Puffs 1 time a day as needed., Disp: , Rfl:   •  Multiple Vitamin (MULTIVITAMIN ADULT PO), Take 1 Tablet by mouth every day., Disp: , Rfl:   •  chlorhexidine (PERIDEX) 0.12 % Solution, Take 15 mL by mouth in the morning and 15 mL in the evening., Disp: , Rfl:   •  flurazepam (DALMANE) 30 MG capsule, Take 30 mg by mouth at bedtime as needed for Sleep., Disp: , Rfl:   •  prochlorperazine (COMPAZINE) 10 MG Tab, Take 10 mg by mouth 2 times a day as needed for Nausea/Vomiting., Disp: , Rfl:   •  clonazepam (KLONOPIN) 2 MG tablet, Take 2 mg by mouth 3 times a day as needed. (Patient not taking: No sig reported), Disp: , Rfl:   •  lisinopril (PRINIVIL) 10 MG Tab, Take 10 mg by mouth every day., Disp: , Rfl:   •  gabapentin (NEURONTIN) 400 MG Cap, Take 400 mg by mouth 3 times a day., Disp: , Rfl:   •  fluoxetine (PROZAC) 40 MG capsule, Take 40 mg by mouth every day. (Patient not taking: No sig reported), Disp: , Rfl:   •  ondansetron (ZOFRAN ODT) 4 MG TABLET DISPERSIBLE, Take 1 Tab by mouth every 6 hours as needed for Nausea., Disp: 20 Tab, Rfl: 0    Review of Systems:  Review of Systems   Constitutional: Negative.    HENT: Negative.    Eyes: Negative.    Respiratory: Negative.    Cardiovascular: Negative.    Gastrointestinal: Positive for vomiting.   Genitourinary: Negative.    Musculoskeletal: Positive for falls and joint pain.   Skin: Negative.    Neurological: Negative.    Endo/Heme/Allergies: Negative.    Psychiatric/Behavioral: Negative.           Physical Exam:  Vitals:    06/16/22 0933   BP: 124/86   Pulse: (!) 111   Resp: 19   Temp: (!) 35.8 °C (96.4 °F)   TempSrc: Temporal   SpO2: 96%   Weight: 86.2 kg (190 lb 0.6 oz)   Height: 1.626 m (5' 4\")       DESC; KARNOFSKY SCALE " WITH ECOG EQUIVALENT: 90, Able to carry on normal activity; minor signs or symptoms of disease (ECOG equivalent 0)        Physical Exam  Constitutional:       Appearance: Normal appearance.   HENT:      Head: Normocephalic.      Mouth/Throat:      Mouth: Mucous membranes are moist.      Pharynx: Oropharynx is clear.   Eyes:      Extraocular Movements: Extraocular movements intact.      Conjunctiva/sclera: Conjunctivae normal.      Pupils: Pupils are equal, round, and reactive to light.   Cardiovascular:      Rate and Rhythm: Normal rate and regular rhythm.      Pulses: Normal pulses.   Pulmonary:      Effort: Pulmonary effort is normal.      Breath sounds: Normal breath sounds.   Chest:   Breasts:      Right: Tenderness present.      Left: Normal.        Comments: Well-healed scar in the right breast in the upper outer quadrant without nodularity or erythema.  No right axillary or supraclavicular adenopathy is noted.  The left breast is normal.  Abdominal:      General: Abdomen is flat. Bowel sounds are normal.      Palpations: Abdomen is soft. There is no mass.   Musculoskeletal:         General: Normal range of motion.   Skin:     General: Skin is warm.   Neurological:      General: No focal deficit present.      Mental Status: She is alert and oriented to person, place, and time.   Psychiatric:         Mood and Affect: Mood normal.         Behavior: Behavior normal.            Labs:  [facility] on 04/25/2022   Component Date Value Ref Range Status   • Creatinine 04/25/2022 0.65  0.50 - 1.40 mg/dL Final   • WBC 04/25/2022 1.9 (A) 4.8 - 10.8 K/uL Final    Comment: Results confirmed by repeat testing.. This result has been called to RN  [id number] by KARRIE UMANZOR on 04 25 2022 at 1232, and has been read back.     • RBC 04/25/2022 3.93 (A) 4.20 - 5.40 M/uL Final   • Hemoglobin 04/25/2022 12.6  12.0 - 16.0 g/dL Final   • Hematocrit 04/25/2022 37.7  37.0 - 47.0 % Final   • MCV 04/25/2022 95.9  81.4 - 97.8  fL Final   • MCH 04/25/2022 32.1  27.0 - 33.0 pg Final   • MCHC 04/25/2022 33.4 (A) 33.6 - 35.0 g/dL Final   • RDW 04/25/2022 56.6 (A) 35.9 - 50.0 fL Final   • Platelet Count 04/25/2022 195  164 - 446 K/uL Final   • MPV 04/25/2022 9.5  9.0 - 12.9 fL Final   • Neutrophils-Polys 04/25/2022 35.50 (A) 44.00 - 72.00 % Final   • Lymphocytes 04/25/2022 53.40 (A) 22.00 - 41.00 % Final   • Monocytes 04/25/2022 5.80  0.00 - 13.40 % Final   • Eosinophils 04/25/2022 1.10  0.00 - 6.90 % Final   • Basophils 04/25/2022 2.60 (A) 0.00 - 1.80 % Final   • Immature Granulocytes 04/25/2022 1.60 (A) 0.00 - 0.90 % Final   • Nucleated RBC 04/25/2022 0.00  /100 WBC Final   • Neutrophils (Absolute) 04/25/2022 0.67 (A) 2.00 - 7.15 K/uL Final    Includes immature neutrophils, if present.   • Lymphs (Absolute) 04/25/2022 1.01  1.00 - 4.80 K/uL Final   • Monos (Absolute) 04/25/2022 0.11  0.00 - 0.85 K/uL Final   • Eos (Absolute) 04/25/2022 0.02  0.00 - 0.51 K/uL Final   • Baso (Absolute) 04/25/2022 0.05  0.00 - 0.12 K/uL Final   • Immature Granulocytes (abs) 04/25/2022 0.03  0.00 - 0.11 K/uL Final   • NRBC (Absolute) 04/25/2022 0.00  K/uL Final   • Outpt Infus CBC Comment 04/25/2022 see below   Final    Comment: Per physician request, the automated differential results reported on this  patient have not been verified by a manual method.     • GFR (CKD-EPI) 04/25/2022 101  >60 mL/min/1.73 m 2 Final    Comment: Effective 3/15/2022, estimated Glomerular Filtration Rate  is calculated using race neutral CKD-EPI 2021 equation  per NKF-ASN recommendations.     [facility] on 04/18/2022   Component Date Value Ref Range Status   • TSH 04/18/2022 1.050  0.380 - 5.330 uIU/mL Final    Comment: Reference Range:    Pregnant Females, 1st Trimester  0.050-3.700  Pregnant Females, 2nd Trimester  0.310-4.350  Pregnant Females, 3rd Trimester  0.410-5.180     • Free T-4 04/18/2022 0.93  0.93 - 1.70 ng/dL Final   • WBC 04/18/2022 3.2 (A) 4.8 - 10.8  K/uL Final   • RBC 04/18/2022 3.40 (A) 4.20 - 5.40 M/uL Final   • Hemoglobin 04/18/2022 10.8 (A) 12.0 - 16.0 g/dL Final   • Hematocrit 04/18/2022 33.4 (A) 37.0 - 47.0 % Final   • MCV 04/18/2022 98.2 (A) 81.4 - 97.8 fL Final   • MCH 04/18/2022 31.8  27.0 - 33.0 pg Final   • MCHC 04/18/2022 32.3 (A) 33.6 - 35.0 g/dL Final   • RDW 04/18/2022 54.8 (A) 35.9 - 50.0 fL Final   • Platelet Count 04/18/2022 290  164 - 446 K/uL Final   • MPV 04/18/2022 9.7  9.0 - 12.9 fL Final   • Neutrophils-Polys 04/18/2022 43.80 (A) 44.00 - 72.00 % Final   • Lymphocytes 04/18/2022 30.80  22.00 - 41.00 % Final   • Monocytes 04/18/2022 20.20 (A) 0.00 - 13.40 % Final   • Eosinophils 04/18/2022 3.70  0.00 - 6.90 % Final   • Basophils 04/18/2022 1.20  0.00 - 1.80 % Final   • Immature Granulocytes 04/18/2022 0.30  0.00 - 0.90 % Final   • Nucleated RBC 04/18/2022 0.00  /100 WBC Final   • Neutrophils (Absolute) 04/18/2022 1.40 (A) 2.00 - 7.15 K/uL Final    Includes immature neutrophils, if present.   • Lymphs (Absolute) 04/18/2022 0.99 (A) 1.00 - 4.80 K/uL Final   • Monos (Absolute) 04/18/2022 0.65  0.00 - 0.85 K/uL Final   • Eos (Absolute) 04/18/2022 0.12  0.00 - 0.51 K/uL Final   • Baso (Absolute) 04/18/2022 0.04  0.00 - 0.12 K/uL Final   • Immature Granulocytes (abs) 04/18/2022 0.01  0.00 - 0.11 K/uL Final   • NRBC (Absolute) 04/18/2022 0.00  K/uL Final   • Outpt Infus CBC Comment 04/18/2022 see below   Final    Comment: Per physician request, the automated differential results reported on this  patient have not been verified by a manual method.     • Sodium 04/18/2022 138  135 - 145 mmol/L Final   • Potassium 04/18/2022 3.5 (A) 3.6 - 5.5 mmol/L Final   • Chloride 04/18/2022 103  96 - 112 mmol/L Final   • Co2 04/18/2022 27  20 - 33 mmol/L Final   • Anion Gap 04/18/2022 8.0  7.0 - 16.0 Final   • Glucose 04/18/2022 116 (A) 65 - 99 mg/dL Final   • Bun 04/18/2022 8  8 - 22 mg/dL Final   • Creatinine 04/18/2022 0.57  0.50 - 1.40 mg/dL Final   •  Calcium 04/18/2022 9.3  8.5 - 10.5 mg/dL Final   • AST(SGOT) 04/18/2022 30  12 - 45 U/L Final   • ALT(SGPT) 04/18/2022 18  2 - 50 U/L Final   • Alkaline Phosphatase 04/18/2022 135 (A) 30 - 99 U/L Final   • Total Bilirubin 04/18/2022 <0.2  0.1 - 1.5 mg/dL Final   • Albumin 04/18/2022 3.9  3.2 - 4.9 g/dL Final   • Total Protein 04/18/2022 6.4  6.0 - 8.2 g/dL Final   • Globulin 04/18/2022 2.5  1.9 - 3.5 g/dL Final   • A-G Ratio 04/18/2022 1.6  g/dL Final   • Peripheral Smear Review 04/18/2022 see below   Final    Comment: Due to instrument suspect flags, further review of peripheral smear is  indicated on this patient sample. This review may or may not result in  abnormal findings.     • GFR (CKD-EPI) 04/18/2022 104  >60 mL/min/1.73 m 2 Final    Comment: Effective 3/15/2022, estimated Glomerular Filtration Rate  is calculated using race neutral CKD-EPI 2021 equation  per NKF-ASN recommendations.     [facility] on 04/02/2022   Component Date Value Ref Range Status   • Creatinine 04/02/2022 0.65  0.50 - 1.40 mg/dL Final   • WBC 04/02/2022 3.0 (A) 4.8 - 10.8 K/uL Final   • RBC 04/02/2022 4.05 (A) 4.20 - 5.40 M/uL Final   • Hemoglobin 04/02/2022 12.7  12.0 - 16.0 g/dL Final   • Hematocrit 04/02/2022 37.9  37.0 - 47.0 % Final   • MCV 04/02/2022 93.6  81.4 - 97.8 fL Final   • MCH 04/02/2022 31.4  27.0 - 33.0 pg Final   • MCHC 04/02/2022 33.5 (A) 33.6 - 35.0 g/dL Final   • RDW 04/02/2022 50.1 (A) 35.9 - 50.0 fL Final   • Platelet Count 04/02/2022 186  164 - 446 K/uL Final   • MPV 04/02/2022 9.9  9.0 - 12.9 fL Final   • Neutrophils-Polys 04/02/2022 60.00  44.00 - 72.00 % Final   • Lymphocytes 04/02/2022 27.80  22.00 - 41.00 % Final   • Monocytes 04/02/2022 7.30  0.00 - 13.40 % Final   • Eosinophils 04/02/2022 3.30  0.00 - 6.90 % Final   • Basophils 04/02/2022 1.30  0.00 - 1.80 % Final   • Immature Granulocytes 04/02/2022 0.30  0.00 - 0.90 % Final   • Nucleated RBC 04/02/2022 0.00  /100 WBC Final   •  Neutrophils (Absolute) 04/02/2022 1.81 (A) 2.00 - 7.15 K/uL Final    Includes immature neutrophils, if present.   • Lymphs (Absolute) 04/02/2022 0.84 (A) 1.00 - 4.80 K/uL Final   • Monos (Absolute) 04/02/2022 0.22  0.00 - 0.85 K/uL Final   • Eos (Absolute) 04/02/2022 0.10  0.00 - 0.51 K/uL Final   • Baso (Absolute) 04/02/2022 0.04  0.00 - 0.12 K/uL Final   • Immature Granulocytes (abs) 04/02/2022 0.01  0.00 - 0.11 K/uL Final   • NRBC (Absolute) 04/02/2022 0.00  K/uL Final   • Outpt Infus CBC Comment 04/02/2022 see below   Final    Comment: Per physician request, the automated differential results reported on this  patient have not been verified by a manual method.     • GFR (CKD-EPI) 04/02/2022 101  >60 mL/min/1.73 m 2 Final    Comment: Effective 3/15/2022, estimated Glomerular Filtration Rate  is calculated using race neutral CKD-EPI 2021 equation  per NKF-ASN recommendations.         Imaging:   All listed images below have been independently reviewed by me. I agree with the findings as summarized below:    DX-KNEE 3 VIEWS LEFT    Result Date: 4/11/2022 4/11/2022 4:12 PM HISTORY/REASON FOR EXAM: . Recent injury. Pain. History of metastatic disease. TECHNIQUE/EXAM DESCRIPTION AND NUMBER OF VIEWS:  3 views of the LEFT knee. COMPARISON: None FINDINGS: Mild osteopenia. No acute fracture identified. Small knee joint effusion. Intramedullary nail is located within the proximal left tibia.     1.  No acute fracture of the left knee identified. No cortical bone destruction. 2.  No knee joint effusion.    DX-TIBIA AND FIBULA LEFT    Result Date: 4/11/2022 4/11/2022 4:20 PM HISTORY/REASON FOR EXAM: . Left knee pain. Left tib-fib pain history of metastatic disease. TECHNIQUE/EXAM DESCRIPTION AND NUMBER OF VIEWS:  2 views of the LEFT tibia and fibula. COMPARISON: None FINDINGS: There is intramedullary nail fixation of the left tibia. Hardware appears intact. There is an old fracture of the distal tibial diaphysis. There is  plate and screw fixation of the mid to distal left fibula. Where appears intact. There is an old fracture of the distal left fibular diaphysis. Mild osteopenia. No cortical bone destruction or periosteal reaction identified.     Old fractures of the distal left tibia and fibula and orthopedic fixation. No acute fracture or bony destruction identified.       Pathology:   SURGICAL PATHOLOGY CONSULTATION                                  ** ADDENDUM **                             (SEE SUPPLEMENT A&B)   ADDENDUM:     This case is being addended to report the results of PD-L1 22C3   FDA(KEYTRUDA) for   TNBC(Breast), performed by [facility] on block A2 and the   results of Histology Image Analysis, Global HER2 Breast IHC, performed   by [facility] on block A1.     PD-L1 22C3 FDA(KEYTRUDA) for TNBC(Breast): CPS>/=10 (PD-L1 EXPRESSION)   Combined Positive score: 10     HER2 Breast: NEGATIVE   Score: 1+       Please see separate [facility] reports for further   details.     Addendum Signed By:  rBendan Kumar MD   Addendum Date:  1/31/2022   Original Report Date:  1/25/2022         FINAL DIAGNOSIS:       A. Left upper lobe lung nodule cores x2:          Two core biopsies of lung tissue demonstrating morphologic and           immunohistochemical findings consistent with metastatic breast           ductal carcinoma.          See comment.     Comment: The patient's CT scan of the chest, abdomen and pelvis   performed at [facility] demonstrated a new 13 mm   juxtapleural nodule in the left upper lobe. The patient has a recent   history of a right breast invasive ductal carcinoma, grade 3. A slide   from the patient's previous right breast invasive ductal carcinoma is   reviewed ([id number]). The patient's previous invasive ductal carcinoma   morphologically resembles the metastatic breast ductal carcinoma noted   within the left upper lobe lung nodule. In addition, the    immunohistochemical findings support metastatic breast ductal carcinoma   to the lung. The slides are reviewed with Dr. Santiago with agreement   on the interpretation. Pertinent slides are also reviewed with Dr. Ambrosio   with agreement on the interpretation. The results of HER-2/shawna analysis   are reported in an addendum report. Tissue blocks A1 and A2 both have   adequate tumor cells for additional ancillary studies if requested.                                           Diagnosis performed by:                                       MAY GARCIA MD       Impression:  1.  Metastatic triple negative breast cancer with oligometastatic disease-solitary lung nodule, PD-L1 CPS score of 10% with complete clinical response by imaging to carboplatinum/gemcitabine/pembrolizumab.  2. stage II, grade 3 triple negative breast cancer of the right breast (pT2, PN 0), Ki-67 80% diagnosed 3/2021 status post lumpectomy, adjuvant TC x4 chemotherapy and radiation therapy to the whole breast  3.  Chronic pain syndrome on narcotics  4.  Complex psychosocial history  5.  Asymptomatic neutropenia from chemotherapy requiring dose delay  6.  Vomiting from chemotherapy, will add fosaprepitant to her regimen.    Plan: Going to complete 6 cycles of carboplatin/gemcitabine and continue pembrolizumab indefinitely.  The results of her CT scans were explained in detail.  She understands that she has stage IV disease that is currently in remission but this does not translate into a cure except in very unusual circumstances.  However her prognosis is relatively good given the fact that she had oligometastatic disease and an early complete response to therapy.  We will plan to follow her with imaging every 3 to 4 months.  I will see her back in 3 weeks.    Any questions and concerns raised by the patient were answered to the best of my ability. Thank you for allowing me to participate in the care for this patient. Please feel free to contact me for any  questions or concerns.

## 2022-06-25 NOTE — PROGRESS NOTES
Chemotherapy Verification - PRIMARY RN      Height = 161.5 cm  Weight = 85.8 kg  BSA = 1.96 m2       Medication: Carboplatin  Dose: AUC 2  Calculated Dose: (((140-59)*[id number])*(0.7+(1*0.15))/(0.7*72)+25)*2*((1=1)+(1=2)) = 284.417 mg                            (In mg/m2, AUC, mg/kg)     Medication: Gemzar  Dose: 750 mg/m2  Calculated Dose: 1,470 mg                             (In mg/m2, AUC, mg/kg)          I confirm this process was performed independently with the BSA and all final chemotherapy dosing calculations congruent.  Any discrepancies of 10% or greater have been addressed with the chemotherapy pharmacist. The resolution of the discrepancy has been documented in the EPIC progress notes.

## 2022-06-25 NOTE — PROGRESS NOTES
"Pharmacy Chemotherapy Verification    DX: Breast CA Stage IIA ER-, FL-, HER2-    Cycle 6 Day 8  Previous treatment = C6D1 on 6/16/2022    Regimen: Pembrolizumab + CARBOplatin + Gemcitabine  Pembrolizumab 400 mg IV over 30 minutes on Day 1  42-day cycle until DP or UT or until up to 24 months of therapy has been completed concurrent with  CARBOplatin AUC 2 IV over 30 minutes on Days 1 and 8  Gemcitabine 1000 mg/m2 IV over 30 minutes on Days 1 and 8   -3/26/22, starting with C2, dose reduced by Tori MCCARTHY to 750 mg/m2    21-day cycle until DP or UT  NCCN Guidelines for Breast Cancer V.2.2022.  Moncho J, et al. Lancet. 2020;396(47375);2579-1951.  Risa M, et al. Eur J Cancer. 2020;131;68-75.    Allergies: Sulfa drugs, Toradol, and Aspirin  /82   Pulse 97   Temp 36.2 °C (97.2 °F) (Temporal)   Resp 18   Ht 1.615 m (5' 3.58\")   Wt 85.8 kg (189 lb 2.5 oz)   LMP 11/02/2015   SpO2 97%   BMI 32.90 kg/m²   Body surface area is 1.96 meters squared.     Labs 6/25/2022  ANC~ 1520 Plt = 112k   Hgb = 10.6     SCr = 0.56mg/dL CrCl ~ 116mL/min (Scr 0.7 used)      Labs 5/30/22  TSH 2.32 Free T 40.98    Pembrolizumab (Keytruda) 400 mg fixed dose, no calculation required   <10% difference, OK to treat with final dose = 0 mg IV    CARBOplatin (Paraplatin) AUC 2 (116 mL/min + 25) = 282 mg   <10% difference, okay to treat with final dose = 282 mg IV    Gemcitabine (Gemzar) 750 mg/m2 x 1.96 m2 = 1470 mg   <10% difference, okay to treat with final dose = 1470 mg IV    Emily Blas, PharmD, BCPS    "

## 2022-06-25 NOTE — PROGRESS NOTES
Patient presents for day eight cycle six Carboplatin / Gemzar. Port accessed with blood drawn as ordered. Lab results within parameters to proceed with treatment. Infusions completed with no new patient complaints. Port flushed per protocol and de-accessed. Patient scheduled for her next appointment and released in no acute distress.

## 2022-06-25 NOTE — PROGRESS NOTES
"Pharmacy Chemotherapy Calculation    Dx: metastatic Breast Cancer, triple negative    Protocol: Pembrolizumab + CARBOplatin/Gemcitabine      Pembrolizumab 400 mg IV over 30 minutes on Day 1    42 day cycle until DP or UT or until up to 24 months of therapy has been completed   CARBOplatin AUC 2 IV over 30 minute on Days 1 and 8   Gemcitabine   IV over 30 minutes on Days 1 and 8   NOTE 3/26/22: Dose reduced to 750 mg/m² with Cycle 2 Day 1 per Dr. Valle    21 day cycle until DP or UT  NCCN Guidelines for Breast Cancer V.2.2022.   Moncho LINDSAY, et al. Lancet. 2020;396(27701): 6581-4418.  Risa M, et al. Eur J Cancer. 2020;131:68-75.    Allergies:  Sulfa drugs, Toradol, and Aspirin     /82   Pulse 97   Temp 36.2 °C (97.2 °F) (Temporal)   Resp 18   Ht 1.615 m (5' 3.58\")   Wt 85.8 kg (189 lb 2.5 oz)   LMP 11/02/2015   SpO2 97%   BMI 32.90 kg/m²  Body surface area is 1.96 meters squared.    Labs 6/25/22  ANC~ 1520 Plt = 112k   Hgb = 10.6     SCr = 0.56 mg/dL CrCl ~ 116.4 mL/min (minimum SCr of 0.7)      Drug Order   (Drug name, dose, route, IV Fluid & volume, frequency, number of doses) Cycle 6 Day 8  Previous treatment: C6D1 on 6/16/22   Medication = CARBOplatin (Paraplatin)   Base Dose= AUC 2   Calc Dose: Base Dose x (116.4 mL/min + 25) = 282.2 mg  Final Dose = 282 mg  Route = IV  Fluid & Volume =  mL  Admin Duration = Over 30 minutes          <10% difference, okay to treat with final dose   Medication = Gemcitabine (Gemzar)  Base Dose= 750 mg/m²    Calc Dose:Base Dose x 1.96 m² = 1470 mg  Final Dose = 1470 mg  Route = IV  Fluid & Volume =  mL  Admin Duration = Over 30 minutes          <10% difference, okay to treat with final dose   By my signature below, I confirm this process was performed independently with the BSA and all final chemotherapy dosing calculations congruent. I have reviewed the above chemotherapy order and that my calculation of the final dose and BSA (when applicable) corroborate " those calculations of the  pharmacist. Discrepancies of 10% or greater in the written dose have been addressed and documented within the EPIC Progress notes.    Jung Sena, PharmD

## 2022-06-25 NOTE — PROGRESS NOTES
Chemotherapy Verification - SECONDARY RN     Cycle 6 Day 8    Height = 161.5 cm  Weight = 85.8 kg  BSA = 1.96 m2       Medication: Carboplatin  Dose: AUC 2    Calculated Dose: 283 mg                              Medication: Gemcitabine  Dose: 750 mg/m2    Calculated Dose: 1,470 mg                               Carboplatin calculation (if applicable):  (2*(116.356+25)) = 282.712    I confirm that this process was performed independently.

## 2022-07-02 NOTE — PROGRESS NOTES
"Pharmacy Chemotherapy Calculation    Dx: metastatic Breast Cancer, triple negative    Protocol: Pembrolizumab + CARBOplatin/Gemcitabine      *Dosing Reference*  Pembrolizumab 400 mg IV over 30 minutes on Day 1   42 day cycle until DP or UT or until up to 24 months of therapy has been completed  ~concurrent with~   CARBOplatin AUC 2 IV over 30 minute on Days 1 and 8   Gemcitabine   IV over 30 minutes on Days 1 and 8    -- NOTE 3/26/22: Dose reduced to 750 mg/m² with Cycle 2 Day 1 per Dr. Valle   21 day cycle  x 6 cycles per MD (completed 6/25/22)  NCCN Guidelines for Breast Cancer V.2.2022.   Moncho J, et al. Lancet. 2020;396(83265): 2377-0288.  Risa M, et al. Eur J Cancer. 2020;131:68-75.    Allergies:  Sulfa drugs, Toradol, and Aspirin     BP (!) 141/79   Pulse 88   Temp 36.7 °C (98 °F) (Temporal)   Resp 18   Ht 1.615 m (5' 3.58\")   Wt 88 kg (194 lb 0.1 oz)   LMP 11/02/2015   SpO2 95%   BMI 33.74 kg/m²  Body surface area is 1.99 meters squared.    All labs (7/7/22) and thyroid panel (pending) within treatment plan parameters, except: plt 93 k  **MD aware of current labs and okay given to proceed with treatment.       Drug Order   (Drug name, dose, route, IV Fluid & volume, frequency, number of doses) Cycle 7 - okay per MD to give early  Previous treatment: C6D8 on 6/25/22   Medication = Pembrolizumab (Keytruda)  Base Dose = 400 mg fixed dose   Calc Dose: no calculation required  Final Dose = 400 mg  Route = IV  Fluid & Volume = NS 50 mL  Admin Duration = Over 30 minutes          No calculation required, okay to treat with final dose   By my signature below, I confirm this process was performed independently with the BSA and all final chemotherapy dosing calculations congruent. I have reviewed the above chemotherapy order and that my calculation of the final dose and BSA (when applicable) corroborate those calculations of the  pharmacist. Discrepancies of 10% or greater in the written dose have " been addressed and documented within the EPIC Progress notes.    Jessica Laws, PharmD

## 2022-07-07 NOTE — PROGRESS NOTES
"Pharmacy Chemotherapy Calculations Note:    Patient Name: Ashleigh Marquis        Dx: mTNBC   Cycle:  7, Day 1 Previous treatment: C6D8 = 6/25/22     Protocol: Pembrolizumab + CARBOplatin/Gemcitabine      Pembrolizumab 400 mg IV over 30 minutes on Day 1               42 day cycle until DP or UT or until up to 24 months of therapy has been completed   CARBOplatin AUC 2 IV over 30 minute on Days 1 and 8   Gemcitabine   IV over 30 minutes on Days 1 and 8   NOTE 3/26/22: Dose reduced to 750 mg/m² with Cycle 2 Day 1 per Dr. Valle               21 day cycle until DP or UT  6 cycles of Carbo/gem completed, single agent pembrolizumab starting C7    NCCN Guidelines for Breast Cancer V.2.2022.   Moncho J, et al. Lancet. 2020;396(53484): 1970-3020.  Risa M, et al. Eur J Cancer. 2020;131:68-75.          BP (!) 141/79   Pulse 88   Temp 36.7 °C (98 °F) (Temporal)   Resp 18   Ht 1.615 m (5' 3.58\")   Wt 88 kg (194 lb 0.1 oz)   LMP 11/02/2015   SpO2 95%   BMI 33.74 kg/m²  Body surface area is 1.99 meters squared.  ANC~ 1730  Plt = 93 k    SCr = 0.62 mg/dL CrCl = 119 mL/min  LFT's = WNL  TBili = 0.2  TSH = in process T4 = in process  Ok to treat with PLT 93, RBTO Dr. Bailey    pembrolizumab 400 mg fixed dose = 400 mg  <10% difference, ok to proceed with final written dose = 400 mg  Ok to give early per Dr. Lynn Correa, PharmD BCPS           "

## 2022-07-07 NOTE — PROGRESS NOTES
Chemotherapy Verification - PRIMARY RN      Height = 63.58 in  Weight = 194 lb  BSA = 1.99 m2       Medication: Keytruda  Dose: 400 mg  Calculated Dose: 400 mg / Fixed dose                            (In mg/m2, AUC, mg/kg)      I confirm this process was performed independently with the BSA and all final chemotherapy dosing calculations congruent.  Any discrepancies of 10% or greater have been addressed with the chemotherapy pharmacist. The resolution of the discrepancy has been documented in the EPIC progress notes.

## 2022-07-07 NOTE — PROGRESS NOTES
Chemotherapy Verification - SECONDARY RN       Height = 161.5 cm  Weight = 88 kg  BSA = 1.99 m2       Medication: Keytruda  Dose: 400 mg (Set dose)  Calculated Dose: 400 mg                             (In mg/m2, AUC, mg/kg)       I confirm that this process was performed independently.

## 2022-07-07 NOTE — PROGRESS NOTES
into [facility] for Cycle 7 of Keytruda (single agent) for Malignant neoplasm of central portion of breast in female, estrogen receptor negative. Pt denied having any new or acute complaints today, reports tolerating past treatments well. Patient very loud, using inappropriate language throughout treatment. Port accessed in a sterile manner, had + blood return, flushed briskly. Per Dr.Schwartzberg brown to treat with platelet of 93 k. Pt given anticancer therapy as prescribed, tolerated well, denied having any complaints during or after infusion. Port had + blood return after, flushed per [facility] policy, de-accessed, needle intact, insertion site covered with sterile gauze and paper tape. Discharge home to self care in [facility]. Appointment confirm for next treatment.

## 2022-07-07 NOTE — PROGRESS NOTES
Subjective     Ashleigh Marquis is a 61 y.o. female who presents with Cancer (Prechemo)          HPI    Patient seen today in follow-up for Metastatic Triple Negative Breast Cancer to lung.  Patient presents unaccompanied for today's visit.    Oncology history of presenting illness:  Ashleigh Marquis is a 61 y.o. postmenopausal white female who had a mammogram in January 2021 which showed a mass in the upper outer quadrant of the right breast.  Biopsy showed triple negative breast cancer grade 3.  On 3/26/2021 she underwent a right lumpectomy and sentinel lymph node biopsy.  Pathology demonstrated a 2.9 cm invasive ductal carcinoma, with 3 sentinel lymph nodes negative but extensive lymph vascular invasion.  The Ki-67 was 80%.  She was seen by Dr. Valle for medical oncology and underwent staging work-up which was negative including CT scan of the chest abdomen pelvis.  Se was treated with 4 cycles of TC in April 2021, mainly due to social issues related to the fact that she was living in a shelter at that time.  She underwent radiation therapy to the right breast by Dr. Russell subsequently in August 2021.  In January 2022 she had a CT scan of the chest abdomen pelvis which showed a new 1.3 cm nodule subpleurally in the left upper lobe of the lung.  She underwent a biopsy of this lesion which showed metastatic triple negative breast cancer, PD-L1 CPS score of 10%.  She was started on carboplatin gemcitabine day 1, 8 q. 21 and pembrolizumab 400 mg q. 42 days which she tolerated relatively well with some vomiting.  Gemcitabine dose was reduced to 750 mg starting with cycle 2.  She had repeat imaging of the chest on 4/19/2022 demonstrated clinical complete response with disappearance of the subpleural lung nodule.  Cycle 3-day 8 of carboplatin and gemcitabine were held for asymptomatic neutropenia.  Denies any symptoms referable to her metastatic disease.  Does have a complicated psychiatric history and substance  abuse history.  She was a previous alcoholic and quit drinking in 2020.  She has chronic pain and is managed by a pain specialist Dr. Vigil and his PA Marcelina Harris. She is now living in her own apartment which has helped.  ___    Interval history 07/07/22 (ADRIENNE Aguirre):  Patient seen today prior to cycle 7, day 1 of Pembro only.  Patient appears to be tolerating treatment fairly well with expected side effects.  She does complain of lower extremity edema.  She is taking Aldactone 25 mg p.o. twice daily.  She requested a refill couple weeks ago and is scheduled to refill that medication today.  She has been taking it with minimal relief per patient.  She did have very mild edema noted, greater on the left but no pitting noted.  Patient also mentioned that she has been experiencing some aspiration at night.  She complains of wheezing and utilizes an inhaler as needed.  However physical examination today did not reveal any wheezing in all lung fields however she did have some minimal coarse lung sounds throughout.  Her appetite has been stable.  She does have some nausea and vomiting at times but she does have antiemetics with positive results.  I did discuss in detail with patient the treatment recommendations as she has recently completed 6 full cycles of carbo/Gemzar.      Treatment history:  03/26/21: Right lumpectomy and sentinel lymph node biopsy  04/25/21 - 7/7/21: TC x4 cycles (Dr. Valle)  08/18/21 - 09/20/21: XRT - Total of 4 weeks of treatment 3 weeks to the whole breast +5-day boost to the tumor bed because of the extensive lymph vascular vessel invasion and close margin (per Dr. Russell)  ___  01/2022: CT/biopsy consistent with metastatic breast cancer to lung  02/17/22: C1D1 Carbo/Kings with Pembro (Q6 weeks dose)  03/26/22: C2D1 Carbo/Kings (Kings dose reduced to 750 mg)  04/18/22: C3D1 Carbo/Kings with Pembro (Q6 weeks dose)  04/24/22: C3D8 Carbo/Kings HELD d/t neutropenia  05/09/22: C4D1 Carbo/Kings (established  with Dr. Bailey)  05/30/22: C5D1 Carbo/[place] with Pembro (Q6 weeks dose)  06/06/22: C5D8 HELD d/t lack of IV access  06/16/22: C6D1 Carbo/[place]  07/07/22: C7D1 Pembro ONLY (Q6 weeks dose)      Allergies   Allergen Reactions   • Sulfa Drugs Vomiting   • Toradol Vomiting   • Aspirin Vomiting     Severely sick as a child.        Current Outpatient Medications on File Prior to Visit   Medication Sig Dispense Refill   • spironolactone (ALDACTONE) 25 MG Tab Take 1 Tablet by mouth 2 times a day. 60 Tablet 0   • furosemide (LASIX) 20 MG Tab Take 1 Tablet by mouth every day. 5 Tablet 0   • oxyCODONE immediate release (ROXICODONE) 10 MG immediate release tablet Take 10 mg by mouth as needed in the morning and 10 mg as needed at noon and 10 mg as needed in the evening and 10 mg as needed before bedtime for Moderate Pain.     • morphine ER (MS CONTIN) 15 MG Tab CR tablet Take 15 mg by mouth every 12 hours.     • Naloxone HCl (NARCAN NA) Administer  into affected nostril(S).     • ondansetron (ZOFRAN) 4 MG Tab tablet Take 1 Tablet by mouth every four hours as needed for Nausea/Vomiting (for nausea, vomiting). 30 Tablet 6   • prochlorperazine (COMPAZINE) 10 MG Tab Take 1 Tablet by mouth every 6 hours as needed (for nausea, vomiting). 30 Tablet 6   • acetaminophen (TYLENOL) 500 MG Tab Take 1,000 mg by mouth 2 times a day as needed. Indications: Pain     • omeprazole (PRILOSEC) 20 MG delayed-release capsule Take 20 mg by mouth every day.     • VENTOLIN  (90 Base) MCG/ACT Aero Soln inhalation aerosol Inhale 2 Puffs 1 time a day as needed.     • Multiple Vitamin (MULTIVITAMIN ADULT PO) Take 1 Tablet by mouth every day.     • chlorhexidine (PERIDEX) 0.12 % Solution Take 15 mL by mouth in the morning and 15 mL in the evening.     • flurazepam (DALMANE) 30 MG capsule Take 30 mg by mouth at bedtime as needed for Sleep.     • prochlorperazine (COMPAZINE) 10 MG Tab Take 10 mg by mouth 2 times a day as needed for Nausea/Vomiting.    "  • clonazepam (KLONOPIN) 2 MG tablet Take 2 mg by mouth 3 times a day as needed. (Patient not taking: No sig reported)     • lisinopril (PRINIVIL) 10 MG Tab Take 10 mg by mouth every day.     • gabapentin (NEURONTIN) 400 MG Cap Take 400 mg by mouth 3 times a day.     • fluoxetine (PROZAC) 40 MG capsule Take 40 mg by mouth every day. (Patient not taking: No sig reported)     • ondansetron (ZOFRAN ODT) 4 MG TABLET DISPERSIBLE Take 1 Tab by mouth every 6 hours as needed for Nausea. 20 Tab 0     No current facility-administered medications on file prior to visit.       Review of Systems   Constitutional: Positive for malaise/fatigue. Negative for chills, fever and weight loss.   Respiratory: Positive for cough and wheezing. Negative for shortness of breath.    Cardiovascular: Negative for chest pain and palpitations.   Gastrointestinal: Positive for nausea. Negative for constipation, diarrhea and vomiting.        Feels like she aspirates at times in the middle of the night   Genitourinary: Negative for dysuria.              Objective     BP (!) 142/80 (BP Location: Left arm, Patient Position: Sitting, BP Cuff Size: Adult)   Pulse 87   Temp 36.2 °C (97.2 °F) (Temporal)   Resp 16   Ht 1.615 m (5' 3.58\")   Wt 87.5 kg (192 lb 14.4 oz)   LMP 11/02/2015   SpO2 (!) 81%   BMI 33.55 kg/m²      Physical Exam  Vitals reviewed.   Constitutional:       General: She is not in acute distress.     Appearance: Normal appearance. She is not diaphoretic.   Cardiovascular:      Rate and Rhythm: Normal rate and regular rhythm.      Pulses: Normal pulses.      Heart sounds: Normal heart sounds. No murmur heard.    No friction rub. No gallop.   Pulmonary:      Effort: Pulmonary effort is normal. No respiratory distress.      Breath sounds: No wheezing.      Comments: Mild coarse lung sounds in the upper lobes  Abdominal:      General: Bowel sounds are normal. There is no distension.      Palpations: Abdomen is soft. "   Musculoskeletal:         General: Swelling (mild bilateral lower extremities) present. Normal range of motion.   Skin:     General: Skin is warm and dry.   Neurological:      Mental Status: She is alert and oriented to person, place, and time.   Psychiatric:         Mood and Affect: Mood normal.         Behavior: Behavior normal.                   Assessment & Plan        1. Malignant neoplasm of central portion of right breast in female, estrogen receptor negative (HCC)    2. Carcinoma of right breast metastatic to lung (HCC)    3. Edema, unspecified type         1.  Patient with metastatic triple negative breast cancer currently on treatment with carbo/gem with pembrolizumab (every 6-week dose).  Patient has been tolerating treatment fairly well with expected side effects.  Per Dr. Bailey's recommendation patient is to complete 6 cycles of carboplatin/gemcitabine followed by pembrolizumab indefinitely.  Today would be cycle 7 of treatment, therefore patient will proceed with pembrolizumab maintenance every 6 weeks starting today.  I discussed with patient that she has completed the chemotherapy regimen and that she will be on pembrolizumab only at this time, and patient was very happy to hear the news.  Clinically she is stable and if labs meet parameters she is okay to proceed with pembrolizumab today.    2.  Repeat CT chest, abdomen and pelvis is scheduled for next week on 7/11/2022.  Confirmed with patient and she is aware of imaging appointment.    3.  Patient with lower extremity edema currently on Aldactone.  Have highly encouraged patient stay on this medication as prescribed and continue to monitor lower extremity edema.  She had very mild edema noted, worse on the left but no pitting noted on physical exam.    4.  After discussion with Dr. Bailey we will go ahead and schedule patient for a follow-up visit with us in 6 weeks, prior to her next pembrolizumab treatment, or sooner if  needed.      Please note that this dictation was created using voice recognition software. I have made every reasonable attempt to correct obvious errors, but I expect that there are errors of grammar and possibly content that I did not discover before finalizing the note.

## 2022-07-08 NOTE — TELEPHONE ENCOUNTER
Received a notification from [facility] pharmacy and a phone call from the patient stating that her insurance will not cover a 30 day supply of her prescription for Spironolactone.  She has asked that the 90 day prescription go to [facility] pharmacy, as they will deliver.  Please update this prescription.

## 2022-07-14 PROBLEM — K08.89 TOOTH ACHE: Status: RESOLVED | Noted: 2018-07-22 | Resolved: 2022-01-01

## 2022-07-14 PROBLEM — R19.5 HEME POSITIVE STOOL: Status: RESOLVED | Noted: 2019-04-16 | Resolved: 2022-01-01

## 2022-07-14 PROBLEM — K92.0 HEMATEMESIS: Status: RESOLVED | Noted: 2019-04-16 | Resolved: 2022-01-01

## 2022-07-14 PROBLEM — R74.01 TRANSAMINITIS: Status: RESOLVED | Noted: 2019-04-16 | Resolved: 2022-01-01

## 2022-07-14 NOTE — PROGRESS NOTES
Subjective   Follow-up hematology oncology: 7/14/2022  Chief complaint: Metastatic triple negative breast cancer on chemotherapy        Breast Cancer  Associated symptoms include nausea. Pertinent negatives include no chest pain, chills, fever or vomiting.       Patient seen today in follow-up for Metastatic Triple Negative Breast Cancer to lung.  Patient presents unaccompanied for today's visit.    Oncology history of presenting illness:  Ashleigh Marquis is a 61 y.o. postmenopausal white female who had a mammogram in January 2021 which showed a mass in the upper outer quadrant of the right breast.  Biopsy showed triple negative breast cancer grade 3.  On 3/26/2021 she underwent a right lumpectomy and sentinel lymph node biopsy.  Pathology demonstrated a 2.9 cm invasive ductal carcinoma, with 3 sentinel lymph nodes negative but extensive lymph vascular invasion.  The Ki-67 was 80%.  She was seen by Dr. Valle for medical oncology and underwent staging work-up which was negative including CT scan of the chest abdomen pelvis.  Se was treated with 4 cycles of TC in April 2021, mainly due to social issues related to the fact that she was living in a shelter at that time.  She underwent radiation therapy to the right breast by Dr. Russell subsequently in August 2021.  In January 2022 she had a CT scan of the chest abdomen pelvis which showed a new 1.3 cm nodule subpleurally in the left upper lobe of the lung.  She underwent a biopsy of this lesion which showed metastatic triple negative breast cancer, PD-L1 CPS score of 10%.  She was started on carboplatin gemcitabine day 1, 8 q. 21 and pembrolizumab 400 mg q. 42 days which she tolerated relatively well with some vomiting.  Gemcitabine dose was reduced to 750 mg starting with cycle 2.  She had repeat imaging of the chest on 4/19/2022 demonstrated clinical complete response with disappearance of the subpleural lung nodule.  Cycle 3-day 8 of carboplatin and gemcitabine  were held for asymptomatic neutropenia.  Denies any symptoms referable to her metastatic disease.  Does have a complicated psychiatric history and substance abuse history.  She was a previous alcoholic and quit drinking in 2020.  She has chronic pain and is managed by a pain specialist Dr. Vigil and his PA Marcelina Harris. She is now living in her own apartment which has helped.  ___    [facility]  Patient seen today prior to cycle 7, day 1 of Pembro only.  Patient appears to be tolerating treatment fairly well with expected side effects.  She does complain of lower extremity edema.  She is taking Aldactone 25 mg p.o. twice daily.  She requested a refill couple weeks ago and is scheduled to refill that medication today.  She has been taking it with minimal relief per patient.  She did have very mild edema noted, greater on the left but no pitting noted.  Patient also mentioned that she has been experiencing some aspiration at night.  She complains of wheezing and utilizes an inhaler as needed.  However physical examination today did not reveal any wheezing in all lung fields however she did have some minimal coarse lung sounds throughout.  Her appetite has been stable.  She does have some nausea and vomiting at times but she does have antiemetics with positive results.  I did discuss in detail with patient the treatment recommendations as she has recently completed 6 full cycles of carbo/Gemzar.    Interval history 7/14/2022: She comes in to discuss recent CT chest abdomen pelvis to assess response to 6 cycles of carboplatin and gemcitabine with pembrolizumab.  The CT showed complete resolution of the left upper lobe biopsy-proven metastatic breast cancer nodule.  There is no other evidence of metastatic disease currently.  Her only ongoing problem is peripheral edema which has not gotten better on spironolactone.      Treatment history:  03/26/21: Right lumpectomy and sentinel lymph  node biopsy  04/25/21 - 7/7/21: TC x4 cycles (Dr. Valle)  08/18/21 - 09/20/21: XRT - Total of 4 weeks of treatment 3 weeks to the whole breast +5-day boost to the tumor bed because of the extensive lymph vascular vessel invasion and close margin (per Dr. Russell)  ___  01/2022: CT/biopsy consistent with metastatic breast cancer to lung  02/17/22: C1D1 Carbo/[place] with Pembro (Q6 weeks dose)  03/26/22: C2D1 Carbo/[place] ([place] dose reduced to 750 mg)  04/18/22: C3D1 Carbo/[place] with Pembro (Q6 weeks dose)  04/24/22: C3D8 Carbo/[place] HELD d/t neutropenia  05/09/22: C4D1 Carbo/[place] (established with Dr. Bailey)  05/30/22: C5D1 Carbo/[place] with Pembro (Q6 weeks dose)  06/06/22: C5D8 HELD d/t lack of IV access  06/16/22: C6D1 Carbo/[place]  07/07/22: C7D1 Pembro ONLY (Q6 weeks dose)      Allergies   Allergen Reactions   • Sulfa Drugs Vomiting   • Toradol Vomiting   • Aspirin Vomiting     Severely sick as a child.        Current Outpatient Medications on File Prior to Encounter   Medication Sig Dispense Refill   • spironolactone (ALDACTONE) 25 MG Tab Take 1 Tablet by mouth 2 times a day. 180 Tablet 0   • oxyCODONE immediate release (ROXICODONE) 10 MG immediate release tablet Take 10 mg by mouth as needed in the morning and 10 mg as needed at noon and 10 mg as needed in the evening and 10 mg as needed before bedtime for Moderate Pain.     • Naloxone HCl (NARCAN NA) Administer  into affected nostril(S).     • ondansetron (ZOFRAN) 4 MG Tab tablet Take 1 Tablet by mouth every four hours as needed for Nausea/Vomiting (for nausea, vomiting). 30 Tablet 6   • prochlorperazine (COMPAZINE) 10 MG Tab Take 1 Tablet by mouth every 6 hours as needed (for nausea, vomiting). 30 Tablet 6   • acetaminophen (TYLENOL) 500 MG Tab Take 1,000 mg by mouth 2 times a day as needed. Indications: Pain     • omeprazole (PRILOSEC) 20 MG delayed-release capsule Take 20 mg by mouth every day.     • VENTOLIN  (90 Base) MCG/ACT Aero Soln inhalation aerosol  Inhale 2 Puffs 1 time a day as needed.     • Multiple Vitamin (MULTIVITAMIN ADULT PO) Take 1 Tablet by mouth every day.     • chlorhexidine (PERIDEX) 0.12 % Solution Take 15 mL by mouth in the morning and 15 mL in the evening.     • flurazepam (DALMANE) 30 MG capsule Take 30 mg by mouth at bedtime as needed for Sleep.     • prochlorperazine (COMPAZINE) 10 MG Tab Take 10 mg by mouth 2 times a day as needed for Nausea/Vomiting.     • clonazepam (KLONOPIN) 2 MG tablet Take 2 mg by mouth 3 times a day as needed.     • lisinopril (PRINIVIL) 10 MG Tab Take 10 mg by mouth every day.     • gabapentin (NEURONTIN) 400 MG Cap Take 400 mg by mouth 3 times a day.     • ondansetron (ZOFRAN ODT) 4 MG TABLET DISPERSIBLE Take 1 Tab by mouth every 6 hours as needed for Nausea. 20 Tab 0   • furosemide (LASIX) 20 MG Tab Take 1 Tablet by mouth every day. (Patient not taking: No sig reported) 5 Tablet 0   • morphine ER (MS CONTIN) 15 MG Tab CR tablet Take 15 mg by mouth every 12 hours. (Patient not taking: No sig reported)     • fluoxetine (PROZAC) 40 MG capsule Take 40 mg by mouth every day. (Patient not taking: No sig reported)       No current facility-administered medications on file prior to encounter.       Review of Systems   Constitutional: Negative.  Negative for chills, fever and weight loss.   HENT: Negative.    Eyes: Negative.    Respiratory: Negative.  Negative for shortness of breath.    Cardiovascular: Positive for leg swelling. Negative for chest pain and palpitations.   Gastrointestinal: Positive for nausea. Negative for constipation, diarrhea and vomiting.        Feels like she aspirates at times in the middle of the night   Genitourinary: Negative.  Negative for dysuria.   Musculoskeletal: Negative.    Skin: Negative.    Neurological: Negative.    Endo/Heme/Allergies: Negative.    Psychiatric/Behavioral: Negative.               Objective     BP (!) 146/100   Pulse 87   Temp 36.4 °C (97.5 °F) (Temporal)   Resp 18    "Ht 1.6 m (5' 3\")   Wt 87 kg (191 lb 11 oz)   LMP 11/02/2015   SpO2 96%   BMI 33.96 kg/m²      Physical Exam  Vitals reviewed.   Constitutional:       General: She is not in acute distress.     Appearance: Normal appearance. She is not diaphoretic.   Cardiovascular:      Rate and Rhythm: Normal rate and regular rhythm.      Pulses: Normal pulses.      Heart sounds: Normal heart sounds. No murmur heard.    No friction rub. No gallop.   Pulmonary:      Effort: Pulmonary effort is normal. No respiratory distress.      Breath sounds: No wheezing.   Abdominal:      General: Bowel sounds are normal. There is no distension.      Palpations: Abdomen is soft.   Musculoskeletal:         General: Swelling: mild bilateral lower extremities. Normal range of motion.      Right lower leg: Edema present.      Left lower leg: Edema present.   Skin:     General: Skin is warm and dry.   Neurological:      Mental Status: She is alert and oriented to person, place, and time.   Psychiatric:         Mood and Affect: Mood normal.         Behavior: Behavior normal.                   Assessment & Plan        1. Malignant neoplasm of central portion of right breast in female, estrogen receptor negative (HCC)    2. Carcinoma of right breast metastatic to lung (HCC)    3. Edema, unspecified type         1.  Patient with metastatic triple negative breast cancer to lung status post 6 cycles of carbo/gem pembrolizumab now on maintenance pembrolizumab with good tolerance.  2.  Peripheral edema bilateral mild to moderate    Okay now I will just do it 4 weeks when she comes    Morning yes fine  Is she is she lives far away to she can do a work-up by 2 days before plan: Continue pembrolizumab every 6 weeks.  I will see her back in 9 weeks for routine follow-up.  I am adding Lasix 40 mg to her regimen for the peripheral edema and she will continue spironolactone as well.    Please note that this dictation was created using voice recognition " software. I have made every reasonable attempt to correct obvious errors, but I expect that there are errors of grammar and possibly content that I did not discover before finalizing the note.

## 2022-07-20 NOTE — TELEPHONE ENCOUNTER
Received request via: Pharmacy     Was the patient seen in the last year in this department? Yes     Does the patient have an active prescription (recently filled or refills available) for medication(s) requested? Yes     Requested Mediction: Spironolactone (ALDACTONE) 25MG Tab     Initial fill date: 07/08/2022     Disp: 90 tabs     Recieved fax from pharmacy stating that pts ins requires a min of 84 tabs. I called the pharmacy to confirm the dispense quantity of 90 days.

## 2022-07-20 NOTE — TELEPHONE ENCOUNTER
Received request via: Pharmacy    Was the patient seen in the last year in this department? Yes    Does the patient have an active prescription (recently filled or refills available) for medication(s) requested? Yes    Requested Mediction: Spironolactone (ALDACTONE) 25MG Tab    Initial fill date: 07/08/2022    Disp: 90 tabs    Recieved fax from pharmacy stating that pts ins requires a min of 84 tabs. I called the pharmacy to confirm the dispense quantity of 90 days

## 2022-07-29 NOTE — ED NOTES
Distal Thoracic Aortic Aneurysm Pt ambulated to restroom w steady gait.   Distal Thoracic Aortic Aneurysm

## 2022-08-01 NOTE — TELEPHONE ENCOUNTER
Pt called to ask about her upcoming appts with us and then also wanted to state that she has been having severe headaches and has been vomiting for the last 4 days.

## 2022-08-01 NOTE — TELEPHONE ENCOUNTER
RN attempted to contact pt to discuss her call regarding HA and n/v. Message left on pt identifiable voicemail to return RN call. Additional phone number called and person on this line stated it was the wrong contact phone number, and a Ashleigh was unknown to them.     Staff: please send call to RN if she returns my call

## 2022-08-02 NOTE — TELEPHONE ENCOUNTER
Spoke with pt regarding her concerns. Pt states she had intermittent nausea for a few days but it has subsided, she is eating and drinking ok now. Pt concerned with lack of ability to sleep, headaches and inquiring on what she can do. Discussed above with ARTURO Childs, and provider does not believe symptoms are r/t her Keytruda she had on 07/07/22; RN reviewed pt meds at home and advised to utilize her home meds, in which pt states she will start taking her prns as needed. Pt reports she feels a lot better and will contact this office if she has any worsening symptoms or concerns.

## 2022-08-05 NOTE — NON-PROVIDER
"Pt called to report the following: sore/ painful throat, cold sores, fatigue and \"107 F fever\", and she has been around sick people/ public transportation this week. Pt states she is eating/ drinking QS, has no other symptoms to report. She is requesting medication and to be seen by Dr. Bailey. Due to the fact provider is out of this office and pt presents with these symptoms, RN has advised pt to proceed to the nearest UC/ ED for evaluation, and/or contact her PCP for care. Pt has declined to proceed for evaluation. The risks have been explained to pt for not proceeding and benefits of UC/ER have been explained to pt, and she verbalized understanding.   "

## 2022-08-08 NOTE — ED TRIAGE NOTES
Ashleigh Marquis  61 y.o. female  Chief Complaint   Patient presents with   • Abdominal Pain     Pt states she has been having continuous, explosive diarrhea for 2x weeks. Pt has a hx of c-diff   • Neck Pain     Pt additionally has left sided anterior neck pain   • Sent by MD     Pt was recommended to come here by her Oncologist a week ago but hasn't been able to come until today       Vitals:    08/08/22 1051   BP: 102/63   Pulse: 92   Resp: 16   Temp: 36.5 °C (97.7 °F)   SpO2: 94%       Patient educated on triage process and encouraged to alert staff of any changes in condition.    Pt ambulatory to triage for the above complaint. Pt currently receiving tx for stage 4 breast cancer.

## 2022-08-08 NOTE — ED PROVIDER NOTES
"ED Provider Note    CHIEF COMPLAINT  Chief Complaint   Patient presents with   • Abdominal Pain     Pt states she has been having continuous, explosive diarrhea for 2x weeks. Pt has a hx of c-diff   • Neck Pain     Pt additionally has left sided anterior neck pain   • Sent by MD     Pt was recommended to come here by her Oncologist a week ago but hasn't been able to come until today       HPI  Ashleigh Marquis is a 61 y.o. female who presents with 2 complaints.  The first intermittent nausea with vomiting over the past 7 days, daily \"explosive\" diarrhea.  Patient denies recent antibiotic use.  She does have history of C. difficile positive diarrhea in the past.  Her oncologist wished her to come to the hospital a week ago for evaluation, she stated she was unable to presenting today for this evaluation.  Second complaint is 7 days of left anterior neck pain, no palpable lump, slightly worse with movement or swallow.  She denies fever.  She is able to eat and drink, she breathes without difficulty.  She states her doctor sent her here recommending a \"scan of her neck\".  Patient states she is finished her chemotherapy, currently on Keytruda for breast cancer.  Chart states they are considering a course of steroids for colitis secondary to chemotherapy should the patient rule out for C. difficile.  Patient denies pharyngitis.  No acute cough.  She does have body aches.  No bloody emesis or stool.  No pleuritic chest pain.  Currently no abdominal pain, however intermittent cramps throughout the week in association with vomiting and diarrhea.  .  Addendum: Was called by nursing staff at bedside secondary to low oxygen saturation initially felt to be secondary to her dose of morphine for pain.  She then stated she had had slight cough and left anterior pleuritic chest pain over the last week.  She denies history of DVT or pulmonary embolism.  She carries risk factors of breast cancer, making her high risk for pulmonary " "embolism.    REVIEW OF SYSTEMS    Constitutional: Generalized weakness, no fever  Respiratory: No shortness of breath, no pleurisy.  Mild cough  Cardiac: No chest pain  Gastrointestinal: Vomiting, diarrhea  Musculoskeletal: 1 week left anterior neck pain, worse with movement or swallowing.  Patient complains of body aches  Neurologic: No headache         All other systems are negative.       PAST MEDICAL HISTORY  Past Medical History:   Diagnosis Date   • Alcoholism ([facility])    • Anemia     pt states she doesn't think it is a current issue   • Anxiety    • Anxiety and depression 03/23/2021   • Arthritis     osteo-legs, knees   • ASTHMA     Inhaler as needed.    • Breath shortness     On exertion.    • Bronchitis 01/18/2022    In the past   • Cancer ([facility]) 1981    cervical   • Chronic low back pain    • Congestive heart failure ([facility])     1/18/22:  pt states she is not sure.    • Dental disorder     upper and lower dentures. 1/18/22: Pt. denies   • Depression    • EtOH dependence ([facility])    • Fall     alcohol related   • GERD (gastroesophageal reflux disease)    • Heart burn    • HTN    • Hypertension    • Indigestion     GERD   • Muscle disorder    • OSTEOPOROSIS    • Other specified symptom associated with female genital organs     \"I have fibroids bad\"\"   • Pain 03/23/2021    breast, legs, joints   • Pneumonia 01/18/2022    In the past   • Psychiatric disorder    • PTSD (post-traumatic stress disorder)    • Renal disorder     Hx of stones   • Renal failure Around 2019    One time related to heeavily drinking per pt.    • Seizure ([facility])     several years ago r/t alcohol withdrawl   • Seizure ([facility]) 11/03/2020    last seizure was 11/2020   • Ulcer    • Vitamin D deficiency        FAMILY HISTORY  Family History   Problem Relation Age of Onset   • Heart Disease Father    • Hypertension Father    • Diabetes Father    • Cancer Paternal Grandmother    • Depression Other    • Lung Disease Neg Hx    • Stroke Neg Hx        SOCIAL " HISTORY  Social History     Socioeconomic History   • Marital status:    Tobacco Use   • Smoking status: Current Every Day Smoker     Packs/day: 0.25     Years: 20.00     Pack years: 5.00     Types: Cigarettes   • Smokeless tobacco: Former User     Types: Chew   • Tobacco comment: 1/2 pack per day   Vaping Use   • Vaping Use: Never used   Substance and Sexual Activity   • Alcohol use: Not Currently     Comment: vodka, heavy use-pt stated she is not drinking as of 3/23   • Drug use: Not Currently     Comment: In the past smoked pot   • Sexual activity: Never     Partners: Male   Social History Narrative    ** Merged History Encounter **            SURGICAL HISTORY  Past Surgical History:   Procedure Laterality Date   • PB MASTECTOMY, PARTIAL Right 3/26/2021    Procedure: MASTECTOMY, PARTIAL - ESTEBAN LOCALIZED;  Surgeon: Janice Knowles M.D.;  Location: SURGERY SAME DAY [facility];  Service: General   • AXILLARY NODE DISSECTION Right 3/26/2021    Procedure: LYMPHADENECTOMY, AXILLARY.;  Surgeon: Janice Knowles M.D.;  Location: SURGERY SAME DAY [facility];  Service: General   • GASTROSCOPY-ENDO N/A 10/3/2018    Procedure: GASTROSCOPY-ENDO;  Surgeon: Ben Negro M.D.;  Location: ENDOSCOPY [facility];  Service: Gastroenterology   • CYSTOSCOPY STENT PLACEMENT  11/9/2010    Performed by ANGEL HARRIS at SURGERY [facility]   • URETEROSCOPY  11/9/2010    Performed by ANGEL HARRIS at SURGERY [facility]   • LASERTRIPSY  11/9/2010    Performed by ANGEL HARRIS at SURGERY [facility]   • STENT REMOVAL  11/9/2010    Performed by ANGEL HARRIS at SURGERY [facility]   • CYSTO STENT PLACEMNT PRE SURG  10/7/2010    Performed by ANGEL HARRIS at SURGERY [facility]   • OTHER Left 2010    Leg   • HERNIA REPAIR  1977       CURRENT MEDICATIONS  Home Medications    **Home medications have not yet been reviewed for this encounter**         ALLERGIES  Allergies   Allergen Reactions   • Sulfa  "Drugs Vomiting   • Toradol Vomiting   • Aspirin Vomiting     Severely sick as a child.        PHYSICAL EXAM  VITAL SIGNS: /63   Pulse 92   Temp 36.5 °C (97.7 °F) (Temporal)   Resp 16   Ht 1.626 m (5' 4\")   Wt 85.5 kg (188 lb 7.9 oz)   LMP 11/02/2015   SpO2 94%   BMI 32.35 kg/m²   Constitutional:  Non-toxic appearance.   HENT: No facial swelling.  No palpable anterior neck mass.,  No palpable cervical adenopathy  Eyes: Anicteric, no conjunctivitis.     Cardiovascular: Normal heart rate, Normal rhythm  Pulmonary:  No wheezing, No rales.   Gastrointestinal: Soft, No tenderness, No masses  Skin: Warm, Dry, No cyanosis.   Neurologic: Speech is clear, follows commands, facial expression is symmetrical.  Sensation and strength normal  Psychiatric: Affect normal,  Mood normal.  Patient is calm and cooperative  Musculoskeletal: No flank tenderness, no posterior cervical tenderness    EKG/Labs  Results for orders placed or performed during the hospital encounter of 08/08/22   CBC with Differential   Result Value Ref Range    WBC 6.5 4.8 - 10.8 K/uL    RBC 3.49 (L) 4.20 - 5.40 M/uL    Hemoglobin 12.4 12.0 - 16.0 g/dL    Hematocrit 37.3 37.0 - 47.0 %    .9 (H) 81.4 - 97.8 fL    MCH 35.5 (H) 27.0 - 33.0 pg    MCHC 33.2 (L) 33.6 - 35.0 g/dL    RDW 53.6 (H) 35.9 - 50.0 fL    Platelet Count 243 164 - 446 K/uL    MPV 9.7 9.0 - 12.9 fL    Neutrophils-Polys 72.80 (H) 44.00 - 72.00 %    Lymphocytes 15.80 (L) 22.00 - 41.00 %    Monocytes 9.20 0.00 - 13.40 %    Eosinophils 1.10 0.00 - 6.90 %    Basophils 0.60 0.00 - 1.80 %    Immature Granulocytes 0.50 0.00 - 0.90 %    Nucleated RBC 0.00 /100 WBC    Neutrophils (Absolute) 4.75 2.00 - 7.15 K/uL    Lymphs (Absolute) 1.03 1.00 - 4.80 K/uL    Monos (Absolute) 0.60 0.00 - 0.85 K/uL    Eos (Absolute) 0.07 0.00 - 0.51 K/uL    Baso (Absolute) 0.04 0.00 - 0.12 K/uL    Immature Granulocytes (abs) 0.03 0.00 - 0.11 K/uL    NRBC (Absolute) 0.00 K/uL   Complete Metabolic Panel "   Result Value Ref Range    Sodium 137 135 - 145 mmol/L    Potassium 3.6 3.6 - 5.5 mmol/L    Chloride 102 96 - 112 mmol/L    Co2 22 20 - 33 mmol/L    Anion Gap 13.0 7.0 - 16.0    Glucose 80 65 - 99 mg/dL    Bun 12 8 - 22 mg/dL    Creatinine 0.72 0.50 - 1.40 mg/dL    Calcium 9.3 8.5 - 10.5 mg/dL    AST(SGOT) 21 12 - 45 U/L    ALT(SGPT) 14 2 - 50 U/L    Alkaline Phosphatase 140 (H) 30 - 99 U/L    Total Bilirubin 0.2 0.1 - 1.5 mg/dL    Albumin 4.1 3.2 - 4.9 g/dL    Total Protein 7.0 6.0 - 8.2 g/dL    Globulin 2.9 1.9 - 3.5 g/dL    A-G Ratio 1.4 g/dL   Lipase   Result Value Ref Range    Lipase 12 11 - 82 U/L   Urinalysis    Specimen: Urine   Result Value Ref Range    Color Yellow     Character Clear     Specific Gravity 1.011 <1.035    Ph 5.5 5.0 - 8.0    Glucose Negative Negative mg/dL    Ketones Negative Negative mg/dL    Protein Negative Negative mg/dL    Bilirubin Negative Negative    Urobilinogen, Urine 0.2 Negative    Nitrite Negative Negative    Leukocyte Esterase Trace (A) Negative    Occult Blood Trace (A) Negative    Micro Urine Req Microscopic    ESTIMATED GFR   Result Value Ref Range    GFR (CKD-EPI) 96 >60 mL/min/1.73 m 2   URINE MICROSCOPIC (W/UA)   Result Value Ref Range    WBC 2-5 /hpf    RBC 0-2 /hpf    Bacteria Negative None /hpf    Epithelial Cells Negative /hpf    Hyaline Cast 0-2 /lpf   CoV-2, FLU A/B, and RSV by PCR (2-4 [employer]) : Collect NP swab in VTM    Specimen: Respirate   Result Value Ref Range    Influenza virus A RNA Negative Negative    Influenza virus B, PCR Negative Negative    RSV, PCR Negative Negative    SARS-CoV-2 by PCR NotDetected     SARS-CoV-2 Source NP Swab          RADIOLOGY/PROCEDURES  CT-SOFT TISSUE NECK WITH   Final Result      1.  Negative for adenopathy or mass within the neck with specific attention to the site of left posterolateral neck palpable lump      2.  Right internal jugular catheter presen      CT-CTA CHEST PULMONARY ARTERY W/ RECONS    (Results Pending)        COURSE & MEDICAL DECISION MAKING  Pertinent Labs & Imaging studies reviewed. (See chart for details)  Patient presents with 2 complaints, the first was anterior neck pain, states she was sent here for a scan.  CT scan with IV contrast shows no acute abnormalities, no adenopathy, no mass.  Etiology likely musculoskeletal, she is provided reassurance, advised follow-up with her primary care doctor    Second issue of 1 week of diarrhea.  Her vomiting is currently resolved, tolerating oral intake.  Sample of stool will be obtained for culture, C. difficile testing.  Laboratory evaluation shows no elevation of white blood cell count, no dehydration, no acute kidney injury.  She is advised to follow-up with her oncologist regarding her results.  Per the chart, there is a plan for steroids if patient rules out for bacterial colitis.  Thus far patient has been unable to provide a stool sample, when obtained, patient will be discharged.  She is currently well-appearing    FINAL IMPRESSION     1. Generalized weakness     2. Nausea vomiting and diarrhea     3. Anterior neck pain     4. Pleuritic chest pain            Addendum: CT scan of the chest with contrast ordered response to the patient's complaint prior to discharge of left anterior pleuritic chest pain for 1 week.  I am concerned about possibility of pulmonary embolism given her history of cancer, CT scan is currently pending.  If negative, I feel patient will be stable to go home, current room air pulse oximetry is 95%.  Her transient hypoxia likely secondary to the dose of morphine she was administered for pain in the emergency department         Electronically signed by: Edwardo Contreras M.D., 8/8/2022 1:59 PM

## 2022-08-08 NOTE — ADDENDUM NOTE
Encounter addended by: Chantel Qureshi, Med Ass't on: 8/8/2022 11:11 AM   Actions taken: Charge Capture section accepted

## 2022-08-08 NOTE — PROGRESS NOTES
Subjective     Ashleigh Marquis is a 61 y.o. female who presents with Breast Cancer (Patient requested [facility])          HPI    Patient seen today in follow-up for symptoms. Patient with known metastatic triple negative Breast Cancer to lung.  Patient presents unaccompanied for today's visit.     Oncology history of presenting illness:  Ashleigh Marquis is a 61 y.o. postmenopausal white female who had a mammogram in January 2021 which showed a mass in the upper outer quadrant of the right breast.  Biopsy showed triple negative breast cancer grade 3.  On 3/26/2021 she underwent a right lumpectomy and sentinel lymph node biopsy.  Pathology demonstrated a 2.9 cm invasive ductal carcinoma, with 3 sentinel lymph nodes negative but extensive lymph vascular invasion.  The Ki-67 was 80%.  She was seen by Dr. Valle for medical oncology and underwent staging work-up which was negative including CT scan of the chest abdomen pelvis.  Se was treated with 4 cycles of TC in April 2021, mainly due to social issues related to the fact that she was living in a shelter at that time.  She underwent radiation therapy to the right breast by Dr. Russell subsequently in August 2021.  In January 2022 she had a CT scan of the chest abdomen pelvis which showed a new 1.3 cm nodule subpleurally in the left upper lobe of the lung.  She underwent a biopsy of this lesion which showed metastatic triple negative breast cancer, PD-L1 CPS score of 10%.  She was started on carboplatin gemcitabine day 1, 8 q. 21 and pembrolizumab 400 mg q. 42 days which she tolerated relatively well with some vomiting.  Gemcitabine dose was reduced to 750 mg starting with cycle 2.  She had repeat imaging of the chest on 4/19/2022 demonstrated clinical complete response with disappearance of the subpleural lung nodule.  Cycle 3-day 8 of carboplatin and gemcitabine were held for asymptomatic neutropenia.  Denies any symptoms referable to her metastatic disease.  Does  have a complicated psychiatric history and substance abuse history.  She was a previous alcoholic and quit drinking in 2020.  She has chronic pain and is managed by a pain specialist Dr. Vigil and his PA Marcelina Harris. She is now living in her own apartment which has helped.  ___     Interval history 07/07/22 (Carla, APRN):  Patient seen today prior to cycle 7, day 1 of Pembro only.  Patient appears to be tolerating treatment fairly well with expected side effects.  She does complain of lower extremity edema.  She is taking Aldactone 25 mg p.o. twice daily.  She requested a refill couple weeks ago and is scheduled to refill that medication today.  She has been taking it with minimal relief per patient.  She did have very mild edema noted, greater on the left but no pitting noted.  Patient also mentioned that she has been experiencing some aspiration at night.  She complains of wheezing and utilizes an inhaler as needed.  However physical examination today did not reveal any wheezing in all lung fields however she did have some minimal coarse lung sounds throughout.  Her appetite has been stable.  She does have some nausea and vomiting at times but she does have antiemetics with positive results.  I did discuss in detail with patient the treatment recommendations as she has recently completed 6 full cycles of carbo/Gemzar.    Interval history 7/14/2022: She comes in to discuss recent CT chest abdomen pelvis to assess response to 6 cycles of carboplatin and gemcitabine with pembrolizumab.  The CT showed complete resolution of the left upper lobe biopsy-proven metastatic breast cancer nodule.  There is no other evidence of metastatic disease currently.  Her only ongoing problem is peripheral edema which has not gotten better on spironolactone.    Interval History 08/08/22 (Carla, APRN):  Patient presents to office with ongoing symptoms.  Patient complaining of severe pain on the left side of her throat.  Examination  is unremarkable today.  She stated that its not as sick like symptoms but deep inside her throat.  She also is complaining of myalgias and significant weakness.  She has been experiencing headaches.  She did note of fever last week x1.  Vitals today are unremarkable.  Patient has had severe lower extremity edema and has previously required spironolactone as well as Lasix.  She stated that she stopped taking the Lasix as she did not need it anymore.  Patient also noted to have diarrhea in which she has been taking Imodium with no resolution.  She stated that she has been having diarrhea approximately 15 times per day.  Last treatment was 7/7/2022 in which she received immunotherapy, Keytruda only.        Treatment history:  03/26/21: Right lumpectomy and sentinel lymph node biopsy  04/25/21 - 7/7/21: TC x4 cycles (Dr. Valle)  08/18/21 - 09/20/21: XRT - Total of 4 weeks of treatment 3 weeks to the whole breast +5-day boost to the tumor bed because of the extensive lymph vascular vessel invasion and close margin (per Dr. Russell)  ___  01/2022: CT/biopsy consistent with metastatic breast cancer to lung  02/17/22: C1D1 Carbo/Glenn with Pembro (Q6 weeks dose)  03/26/22: C2D1 Carbo/Glenn (Glenn dose reduced to 750 mg)  04/18/22: C3D1 Carbo/Glenn with Pembro (Q6 weeks dose)  04/24/22: C3D8 Carbo/Glenn HELD d/t neutropenia  05/09/22: C4D1 Carbo/Glenn (established with Dr. Bailey)  05/30/22: C5D1 Carbo/Glenn with Pembro (Q6 weeks dose)  06/06/22: C5D8 HELD d/t lack of IV access  06/16/22: C6D1 Carbo/Glenn  07/07/22: C7D1 Pembro ONLY (Q6 weeks dose)    Allergies   Allergen Reactions   • Sulfa Drugs Vomiting   • Toradol Vomiting   • Aspirin Vomiting     Severely sick as a child.      Current Outpatient Medications on File Prior to Encounter   Medication Sig Dispense Refill   • spironolactone (ALDACTONE) 25 MG Tab Take 1 Tablet by mouth 2 times a day. 180 Tablet 0   • oxyCODONE immediate release (ROXICODONE) 10 MG immediate release  tablet Take 10 mg by mouth as needed in the morning and 10 mg as needed at noon and 10 mg as needed in the evening and 10 mg as needed before bedtime for Moderate Pain.     • Naloxone HCl (NARCAN NA) Administer  into affected nostril(S).     • ondansetron (ZOFRAN) 4 MG Tab tablet Take 1 Tablet by mouth every four hours as needed for Nausea/Vomiting (for nausea, vomiting). 30 Tablet 6   • prochlorperazine (COMPAZINE) 10 MG Tab Take 1 Tablet by mouth every 6 hours as needed (for nausea, vomiting). 30 Tablet 6   • acetaminophen (TYLENOL) 500 MG Tab Take 1,000 mg by mouth 2 times a day as needed. Indications: Pain     • omeprazole (PRILOSEC) 20 MG delayed-release capsule Take 20 mg by mouth every day.     • VENTOLIN  (90 Base) MCG/ACT Aero Soln inhalation aerosol Inhale 2 Puffs 1 time a day as needed.     • Multiple Vitamin (MULTIVITAMIN ADULT PO) Take 1 Tablet by mouth every day.     • chlorhexidine (PERIDEX) 0.12 % Solution Take 15 mL by mouth in the morning and 15 mL in the evening.     • flurazepam (DALMANE) 30 MG capsule Take 30 mg by mouth at bedtime as needed for Sleep.     • prochlorperazine (COMPAZINE) 10 MG Tab Take 10 mg by mouth 2 times a day as needed for Nausea/Vomiting.     • clonazepam (KLONOPIN) 2 MG tablet Take 2 mg by mouth 3 times a day as needed.     • lisinopril (PRINIVIL) 10 MG Tab Take 10 mg by mouth every day.     • gabapentin (NEURONTIN) 400 MG Cap Take 400 mg by mouth 3 times a day.     • ondansetron (ZOFRAN ODT) 4 MG TABLET DISPERSIBLE Take 1 Tab by mouth every 6 hours as needed for Nausea. 20 Tab 0   • furosemide (LASIX) 40 MG Tab Take 1 Tablet by mouth every day. (Patient not taking: Reported on 8/8/2022) 30 Tablet 3   • morphine ER (MS CONTIN) 15 MG Tab CR tablet Take 15 mg by mouth every 12 hours. (Patient not taking: No sig reported)     • fluoxetine (PROZAC) 40 MG capsule Take 40 mg by mouth every day. (Patient not taking: No sig reported)       No current facility-administered  "medications on file prior to encounter.         Review of Systems   Constitutional: Positive for diaphoresis, malaise/fatigue and weight loss. Negative for chills and fever.   HENT: Positive for sore throat.    Respiratory: Negative for cough and shortness of breath.    Cardiovascular: Negative for chest pain and palpitations.   Gastrointestinal: Positive for diarrhea and nausea. Negative for constipation and vomiting.   Genitourinary: Negative for dysuria.   Musculoskeletal: Positive for myalgias.   Skin: Negative for itching and rash.   Neurological: Positive for weakness and headaches.              Objective     /87   Pulse 87   Temp 36.3 °C (97.4 °F) (Temporal)   Resp 19   Ht 1.6 m (5' 3\")   Wt 85.6 kg (188 lb 9.7 oz)   LMP 11/02/2015   SpO2 94%   BMI 33.41 kg/m²      Physical Exam  Vitals reviewed.   Constitutional:       General: She is in acute distress.      Appearance: She is not ill-appearing or diaphoretic.   HENT:      Head: Normocephalic and atraumatic.      Mouth/Throat:      Mouth: Mucous membranes are dry.      Pharynx: Oropharynx is clear. No oropharyngeal exudate or posterior oropharyngeal erythema.   Cardiovascular:      Rate and Rhythm: Normal rate and regular rhythm.   Pulmonary:      Effort: Pulmonary effort is normal. No respiratory distress.      Breath sounds: Normal breath sounds. No wheezing.   Abdominal:      General: Bowel sounds are normal. There is no distension.      Palpations: Abdomen is soft.      Tenderness: There is no abdominal tenderness.   Musculoskeletal:         General: No swelling or tenderness. Normal range of motion.   Lymphadenopathy:      Head:      Right side of head: No submental, submandibular, tonsillar, preauricular, posterior auricular or occipital adenopathy.      Left side of head: No submental, submandibular, tonsillar, preauricular, posterior auricular or occipital adenopathy.      Cervical: No cervical adenopathy.      Right cervical: No " superficial, deep or posterior cervical adenopathy.     Left cervical: No superficial, deep or posterior cervical adenopathy.   Skin:     General: Skin is warm and dry.   Neurological:      Mental Status: She is alert and oriented to person, place, and time.   Psychiatric:         Mood and Affect: Mood normal.         Behavior: Behavior normal.                   Assessment & Plan       1. Diarrhea, unspecified type  C Diff by PCR rflx Toxin   2. Sore throat  US-SOFT TISSUES OF HEAD - NECK       Patient with metastatic triple negative breast cancer to lung who recently completed 6 cycles of chemotherapy employing carboplatin and Gemzar along with immunotherapy, Keytruda.  Post chemotherapy imaging did show complete resolution of the left upper lobe biopsy-proven metastatic breast cancer nodule with no other evidence of metastatic disease.  Plan is to keep patient on maintenance Keytruda every 6 weeks, next dose scheduled for 8/18/2022.    1.  Patient presents today with complaints of diarrhea.  It appears the patient stated she is having significant diarrhea approximately 15 times per day with no resolution with the use of Imodium.  I did discuss with patient the importance of ruling out infectious symptoms such as C. difficile.  I have requested patient complete a C. difficile test.  If this is negative then we will need to treat patient immediately for immunotherapy induced colitis and she will require steroids employing prednisone 1 mg/kg (starting dose 80 mg PO daily x1 week). Followed by 60 mg, 40 mg, 20 mg, 10 mg weekly tapering.      2.  Patient complaining of severe sore throat.  She stated he does not feel infectious but rather something worse.  I did recommend ultrasound of the neck.      After further discussion with patient during clinic today, she feels as if she is extremely unwell and needs to find answers immediately.  Her weakness, myalgias, sore throat and diarrhea have been quite debilitating to  the patient.  Therefore we discussed ER visit at this time and she has chosen to proceed to the ER.  I did call the ER charge nurse alerting them of her arrival.

## 2022-08-09 NOTE — ED NOTES
Pt signed out keith BRADFORD, port de-accessed, pt ambulated out of ed independently with spouse, pt requested taxi cab called for her and  stated to have patient ask in the lobby.

## 2022-08-09 NOTE — ED NOTES
MD notified that patient unable to provide stool sample after giving her food. MD also notified that patient's O2 sats 84% on RA and patient does not use oxygen at home.

## 2022-08-09 NOTE — TELEPHONE ENCOUNTER
Called patient per Ann-Marie, to advise her to get her stool samples provided so that we can attempt to treat her if necessary. Patient stated that she will take the bus tomorrow to complete the testing and scheduled a follow up with Ann-Marie on 08/16/22. Patient also ststed that she had a massive headache last night that she believes was related to the contrast done with imaging.I stated that I would document it in her chart.

## 2022-08-09 NOTE — ED PROVIDER NOTES
ED follow-up note:  Patient was signed out to me by the previous physician after extensive work-up for the patient having neck and abdominal discomfort.  There is thought that this was likely related to colitis and she was unable to provide a urine sample and at the time of potential discharge was thought to have been somewhat sleepy with some hypoxia that could have been due to possible recent morphine though there was a possibly discussed of potential PE and the patient consented to a CTA.  I was called to the patient's room and 6:40 PM and patient stated that she wanted to leave that she had been here too long.  We had a discussion regarding the concerns regarding the hypoxia, which is currently not present, the possibility of a blood clot in the setting of recent cancer and treatment and she does not want to stay and she understands that the possibilities of this could include death and disability and understands that if she has any worsening symptoms she will return promptly to the emergency room for further evaluation.  She is discharged AGAINST MEDICAL ADVICE

## 2022-08-09 NOTE — TELEPHONE ENCOUNTER
"Pt called to give us an update regarding Ann-Marie's recommendations after her office visit yesterday. Pt stated she was unable to provide a stool sample when she presented to the ED, even after she was given food, however the patient stated she is sure that it is not C Diff and would like to get started on \"Collitus protocol\" as soon as possible.   "

## 2022-08-16 NOTE — PROGRESS NOTES
Subjective     Ashleigh Marquis is a 61 y.o. female who presents with Breast Cancer (FV after Symptoms)          HPI    Patient seen today in follow-up for symptoms. Patient with known metastatic triple negative Breast Cancer to lung.  Patient presents unaccompanied for today's visit.     Oncology history of presenting illness:  Ashleigh Marquis is a 61 y.o. postmenopausal white female who had a mammogram in January 2021 which showed a mass in the upper outer quadrant of the right breast.  Biopsy showed triple negative breast cancer grade 3.  On 3/26/2021 she underwent a right lumpectomy and sentinel lymph node biopsy.  Pathology demonstrated a 2.9 cm invasive ductal carcinoma, with 3 sentinel lymph nodes negative but extensive lymph vascular invasion.  The Ki-67 was 80%.  She was seen by Dr. Valle for medical oncology and underwent staging work-up which was negative including CT scan of the chest abdomen pelvis.  Se was treated with 4 cycles of TC in April 2021, mainly due to social issues related to the fact that she was living in a shelter at that time.  She underwent radiation therapy to the right breast by Dr. Russell subsequently in August 2021.  In January 2022 she had a CT scan of the chest abdomen pelvis which showed a new 1.3 cm nodule subpleurally in the left upper lobe of the lung.  She underwent a biopsy of this lesion which showed metastatic triple negative breast cancer, PD-L1 CPS score of 10%.  She was started on carboplatin gemcitabine day 1, 8 q. 21 and pembrolizumab 400 mg q. 42 days which she tolerated relatively well with some vomiting.  Gemcitabine dose was reduced to 750 mg starting with cycle 2.  She had repeat imaging of the chest on 4/19/2022 demonstrated clinical complete response with disappearance of the subpleural lung nodule.  Cycle 3-day 8 of carboplatin and gemcitabine were held for asymptomatic neutropenia.  Denies any symptoms referable to her metastatic disease.  Does have a  complicated psychiatric history and substance abuse history.  She was a previous alcoholic and quit drinking in 2020.  She has chronic pain and is managed by a pain specialist Dr. Vigil and his PA Marcelina Harris. She is now living in her own apartment which has helped.  ___     Interval history 07/07/22 (Carla, APRN):  Patient seen today prior to cycle 7, day 1 of Pembro only.  Patient appears to be tolerating treatment fairly well with expected side effects.  She does complain of lower extremity edema.  She is taking Aldactone 25 mg p.o. twice daily.  She requested a refill couple weeks ago and is scheduled to refill that medication today.  She has been taking it with minimal relief per patient.  She did have very mild edema noted, greater on the left but no pitting noted.  Patient also mentioned that she has been experiencing some aspiration at night.  She complains of wheezing and utilizes an inhaler as needed.  However physical examination today did not reveal any wheezing in all lung fields however she did have some minimal coarse lung sounds throughout.  Her appetite has been stable.  She does have some nausea and vomiting at times but she does have antiemetics with positive results.  I did discuss in detail with patient the treatment recommendations as she has recently completed 6 full cycles of carbo/Gemzar.     Interval history 7/14/2022: She comes in to discuss recent CT chest abdomen pelvis to assess response to 6 cycles of carboplatin and gemcitabine with pembrolizumab.  The CT showed complete resolution of the left upper lobe biopsy-proven metastatic breast cancer nodule.  There is no other evidence of metastatic disease currently.  Her only ongoing problem is peripheral edema which has not gotten better on spironolactone.     Interval History 08/08/22 (Carla, APRN):  Patient presents to office with ongoing symptoms.  Patient complaining of severe pain on the left side of her throat.  Examination is  unremarkable today.  She stated that its not as sick like symptoms but deep inside her throat.  She also is complaining of myalgias and significant weakness.  She has been experiencing headaches.  She did note of fever last week x1.  Vitals today are unremarkable.  Patient has had severe lower extremity edema and has previously required spironolactone as well as Lasix.  She stated that she stopped taking the Lasix as she did not need it anymore.  Patient also noted to have diarrhea in which she has been taking Imodium with no resolution.  She stated that she has been having diarrhea approximately 15 times per day.  Last treatment was 7/7/2022 in which she received immunotherapy, Keytruda only.     Interval History 08/16/22 (ADRIENNE Aguirre)  Patient seen today for 1 week follow-up visit after last week's visit with cyclic symptoms.  Patient was seen in the emergency department last week.  Patient had complained of severe throat pain and a CT neck was completed which was negative for any abnormal findings.  Labs are completed which showed a stable elevated alkaline phosphatase however all other labs were unremarkable.  She also had a UA completed which showed trace blood and trace leukocytes.  It was requested that patient undergo stool studies including C. difficile and infectious studies, however patient was unable to give a stool sample during her ER visit.  She did end up leaving AMA, and today patient states she left because she needed to go take care of her dog at home.    Since her ER visit patient has completed C. difficile lab request which was negative.  She continues to have significant diarrhea stating she is having anywhere from 15-25 episodes per day.  She is having accidents at home and unable to leave her home.  She has significant increase in weakness.  She has lost approximately 6 pounds in the last week.  Vital signs are unremarkable today.  She does note some difficulty with breathing, however her  lungs are clear to auscultation today and she is breathing at 98%.      Treatment history:  03/26/21: Right lumpectomy and sentinel lymph node biopsy  04/25/21 - 7/7/21: TC x4 cycles (Dr. Valle)  08/18/21 - 09/20/21: XRT - Total of 4 weeks of treatment 3 weeks to the whole breast +5-day boost to the tumor bed because of the extensive lymph vascular vessel invasion and close margin (per Dr. Russell)  ___  01/2022: CT/biopsy consistent with metastatic breast cancer to lung  02/17/22: C1D1 Carbo/[place] with Pembro (Q6 weeks dose)  03/26/22: C2D1 Carbo/[place] ([place] dose reduced to 750 mg)  04/18/22: C3D1 Carbo/[place] with Pembro (Q6 weeks dose)  04/24/22: C3D8 Carbo/[place] HELD d/t neutropenia  05/09/22: C4D1 Carbo/[place] (established with Dr. Baiely)  05/30/22: C5D1 Carbo/[place] with Pembro (Q6 weeks dose)  06/06/22: C5D8 HELD d/t lack of IV access  06/16/22: C6D1 Carbo/[place]  07/07/22: C7D1 Pembro ONLY (Q6 weeks dose)      Allergies   Allergen Reactions    Sulfa Drugs Vomiting    Toradol Vomiting    Aspirin Vomiting     Severely sick as a child.      Current Outpatient Medications on File Prior to Encounter   Medication Sig Dispense Refill    furosemide (LASIX) 40 MG Tab Take 1 Tablet by mouth every day. (Patient not taking: Reported on 8/8/2022) 30 Tablet 3    spironolactone (ALDACTONE) 25 MG Tab Take 1 Tablet by mouth 2 times a day. 180 Tablet 0    oxyCODONE immediate release (ROXICODONE) 10 MG immediate release tablet Take 10 mg by mouth as needed in the morning and 10 mg as needed at noon and 10 mg as needed in the evening and 10 mg as needed before bedtime for Moderate Pain.      morphine ER (MS CONTIN) 15 MG Tab CR tablet Take 15 mg by mouth every 12 hours. (Patient not taking: No sig reported)      Naloxone HCl (NARCAN NA) Administer  into affected nostril(S).      ondansetron (ZOFRAN) 4 MG Tab tablet Take 1 Tablet by mouth every four hours as needed for Nausea/Vomiting (for nausea, vomiting). 30 Tablet 6    prochlorperazine  "(COMPAZINE) 10 MG Tab Take 1 Tablet by mouth every 6 hours as needed (for nausea, vomiting). 30 Tablet 6    acetaminophen (TYLENOL) 500 MG Tab Take 1,000 mg by mouth 2 times a day as needed. Indications: Pain      omeprazole (PRILOSEC) 20 MG delayed-release capsule Take 20 mg by mouth every day.      VENTOLIN  (90 Base) MCG/ACT Aero Soln inhalation aerosol Inhale 2 Puffs 1 time a day as needed.      Multiple Vitamin (MULTIVITAMIN ADULT PO) Take 1 Tablet by mouth every day.      chlorhexidine (PERIDEX) 0.12 % Solution Take 15 mL by mouth in the morning and 15 mL in the evening.      flurazepam (DALMANE) 30 MG capsule Take 30 mg by mouth at bedtime as needed for Sleep.      prochlorperazine (COMPAZINE) 10 MG Tab Take 10 mg by mouth 2 times a day as needed for Nausea/Vomiting.      clonazepam (KLONOPIN) 2 MG tablet Take 2 mg by mouth 3 times a day as needed.      lisinopril (PRINIVIL) 10 MG Tab Take 10 mg by mouth every day.      gabapentin (NEURONTIN) 400 MG Cap Take 400 mg by mouth 3 times a day.      fluoxetine (PROZAC) 40 MG capsule Take 40 mg by mouth every day. (Patient not taking: No sig reported)      ondansetron (ZOFRAN ODT) 4 MG TABLET DISPERSIBLE Take 1 Tab by mouth every 6 hours as needed for Nausea. 20 Tab 0     No current facility-administered medications on file prior to encounter.         Review of Systems   Constitutional:  Positive for malaise/fatigue and weight loss. Negative for chills and fever.   Respiratory:  Positive for shortness of breath. Negative for cough and wheezing.    Cardiovascular:  Negative for chest pain and palpitations.   Gastrointestinal:  Positive for diarrhea, nausea and vomiting.   Genitourinary:  Negative for dysuria.   Neurological:  Positive for weakness and headaches.            Objective     /86   Pulse 85   Temp 36.4 °C (97.5 °F) (Temporal)   Resp 18   Ht 1.626 m (5' 4\")   Wt 82.9 kg (182 lb 12.2 oz)   LMP 11/02/2015   SpO2 98%   BMI 31.37 kg/m²  "       Physical Exam  Vitals reviewed.   Constitutional:       General: She is in acute distress.      Appearance: Normal appearance. She is not diaphoretic.   HENT:      Head: Normocephalic and atraumatic.   Cardiovascular:      Rate and Rhythm: Normal rate and regular rhythm.      Heart sounds: Normal heart sounds. No murmur heard.    No friction rub. No gallop.   Pulmonary:      Effort: Pulmonary effort is normal. No respiratory distress.      Breath sounds: Normal breath sounds. No wheezing.   Abdominal:      General: There is no distension.      Palpations: Abdomen is soft.      Tenderness: There is no abdominal tenderness.      Comments: Mildly hyperactive   Skin:     General: Skin is warm and dry.   Neurological:      Mental Status: She is alert and oriented to person, place, and time.   Psychiatric:         Mood and Affect: Mood normal.         Behavior: Behavior normal.                     Assessment & Plan       1. Malignant neoplasm of central portion of right breast in female, estrogen receptor negative (HCC)  diphenoxylate-atropine (LOMOTIL) 2.5-0.025 MG Tab    prochlorperazine (COMPAZINE) 10 MG Tab    ondansetron (ZOFRAN) 4 MG Tab tablet    predniSONE (DELTASONE) 20 MG Tab      2. Functional diarrhea  diphenoxylate-atropine (LOMOTIL) 2.5-0.025 MG Tab    predniSONE (DELTASONE) 20 MG Tab    Referral to Gastroenterology      3. Non-intractable vomiting with nausea, unspecified vomiting type  prochlorperazine (COMPAZINE) 10 MG Tab    ondansetron (ZOFRAN) 4 MG Tab tablet      4. Carcinoma of right breast metastatic to lung (HCC)  Referral to Gastroenterology             1. Patient with metastatic triple negative Breast Cancer to lung.  Patient was on carboplatin and Gemzar with temozolomide.  Postchemotherapy scan did show complete resolution of left upper lobe lung.  She has been recently changed to maintenance immunotherapy employing Keytruda every 6 weeks.  She received 1 dose of single agent Keytruda  last on 7/7/2022.  She is due for her next dose this Thursday.  However due to patient's current clinical status we will hold off on immunotherapy at this time while we continue to work patient up for her symptoms.    2.  Patient has continued severe diarrhea.  Did confirm with patient that she did meet the ER AMA to take care of her dog at home.  She stated that there was no other tests that were going to be completed on her at that time.  She continues to have diarrhea with increased weakness.  She has lost approximately 6 pounds.  C. difficile was found to be negative.  I discussed the case in detail with Dr. Bailey.  Concern for either infectious or bacterial infection versus immunotherapy induced colitis.  I will provide patient with prescription strength antidiarrheal and prescribe her Lomotil for which she can take 1 tablet up to 4 times per day as needed.  We will also provide patient with a lower dose of prednisone however start taking 40 mg daily x7 days followed by 20 mg daily x7 days.    In the meantime I will request patient be seen by GI for evaluation ASAP for further rule out/evaluation of infectious versus immunotherapy induced colitis.    3.  Refill request received from patient for both Zofran and Compazine which I did provide with her today.    4.  Patient to follow-up in the clinic in 1 week, or sooner if needed.      Please note that this dictation was created using voice recognition software. I have made every reasonable attempt to correct obvious errors, but I expect that there are errors of grammar and possibly content that I did not discover before finalizing the note.

## 2022-08-16 NOTE — ADDENDUM NOTE
Encounter addended by: Chantel Qureshi, Med Ass't on: 8/16/2022 12:27 PM   Actions taken: Charge Capture section accepted

## 2022-08-19 NOTE — ED NOTES
ERP at bedside.   PACU unable to contact patients wife. RN called patients daughter Kiki who is visiting with the patient wife. Both parties update on surgery.

## 2022-08-19 NOTE — TELEPHONE ENCOUNTER
"Patient states that she received a voice mail message from this office, asking how she is doing.      Patient is doing well, \"all pooped out\" - she took her 2 pills yesterday, and is no longer constipated.   "

## 2022-08-19 NOTE — TELEPHONE ENCOUNTER
Per request of ARTURO Childs, pt called to follow-up on how she was doing. Message left on pt identifiable vm to return RN call to update on how she was doing.     [Pt returned call when RN unavailable. RN attempted to contact pt again after she called back without success.]

## 2022-08-23 NOTE — PROGRESS NOTES
Subjective     Ashleigh Marquis is a 61 y.o. female who presents with Breast Cancer (1 wk fv)          HPI    Patient seen today in follow-up for symptoms. Patient with known metastatic triple negative Breast Cancer to lung.  Patient presents unaccompanied for today's visit.     Oncology history of presenting illness:  Asheligh Marquis is a 61 y.o. postmenopausal white female who had a mammogram in January 2021 which showed a mass in the upper outer quadrant of the right breast.  Biopsy showed triple negative breast cancer grade 3.  On 3/26/2021 she underwent a right lumpectomy and sentinel lymph node biopsy.  Pathology demonstrated a 2.9 cm invasive ductal carcinoma, with 3 sentinel lymph nodes negative but extensive lymph vascular invasion.  The Ki-67 was 80%.  She was seen by Dr. Valle for medical oncology and underwent staging work-up which was negative including CT scan of the chest abdomen pelvis.  Se was treated with 4 cycles of TC in April 2021, mainly due to social issues related to the fact that she was living in a shelter at that time.  She underwent radiation therapy to the right breast by Dr. Russell subsequently in August 2021.  In January 2022 she had a CT scan of the chest abdomen pelvis which showed a new 1.3 cm nodule subpleurally in the left upper lobe of the lung.  She underwent a biopsy of this lesion which showed metastatic triple negative breast cancer, PD-L1 CPS score of 10%.  She was started on carboplatin gemcitabine day 1, 8 q. 21 and pembrolizumab 400 mg q. 42 days which she tolerated relatively well with some vomiting.  Gemcitabine dose was reduced to 750 mg starting with cycle 2.  She had repeat imaging of the chest on 4/19/2022 demonstrated clinical complete response with disappearance of the subpleural lung nodule.  Cycle 3-day 8 of carboplatin and gemcitabine were held for asymptomatic neutropenia.  Denies any symptoms referable to her metastatic disease.  Does have a  complicated psychiatric history and substance abuse history.  She was a previous alcoholic and quit drinking in 2020.  She has chronic pain and is managed by a pain specialist Dr. Vigil and his PA Marcelina Harris. She is now living in her own apartment which has helped.    Interval histories:  Interval history 07/07/22 (Carla, APRN):  Patient seen today prior to cycle 7, day 1 of Pembro only.  Patient appears to be tolerating treatment fairly well with expected side effects.  She does complain of lower extremity edema.  She is taking Aldactone 25 mg p.o. twice daily.  She requested a refill couple weeks ago and is scheduled to refill that medication today.  She has been taking it with minimal relief per patient.  She did have very mild edema noted, greater on the left but no pitting noted.  Patient also mentioned that she has been experiencing some aspiration at night.  She complains of wheezing and utilizes an inhaler as needed.  However physical examination today did not reveal any wheezing in all lung fields however she did have some minimal coarse lung sounds throughout.  Her appetite has been stable.  She does have some nausea and vomiting at times but she does have antiemetics with positive results.  I did discuss in detail with patient the treatment recommendations as she has recently completed 6 full cycles of carbo/Gemzar.     Interval history 7/14/2022: She comes in to discuss recent CT chest abdomen pelvis to assess response to 6 cycles of carboplatin and gemcitabine with pembrolizumab.  The CT showed complete resolution of the left upper lobe biopsy-proven metastatic breast cancer nodule.  There is no other evidence of metastatic disease currently.  Her only ongoing problem is peripheral edema which has not gotten better on spironolactone.     Interval History 08/08/22 (Carla, APRN):  Patient presents to office with ongoing symptoms.  Patient complaining of severe pain on the left side of her throat.   Examination is unremarkable today.  She stated that its not as sick like symptoms but deep inside her throat.  She also is complaining of myalgias and significant weakness.  She has been experiencing headaches.  She did note of fever last week x1.  Vitals today are unremarkable.  Patient has had severe lower extremity edema and has previously required spironolactone as well as Lasix.  She stated that she stopped taking the Lasix as she did not need it anymore.  Patient also noted to have diarrhea in which she has been taking Imodium with no resolution.  She stated that she has been having diarrhea approximately 15 times per day.  Last treatment was 7/7/2022 in which she received immunotherapy, Keytruda only.     Interval History 08/16/22 (ADRIENNE Aguirre)  Patient seen today for 1 week follow-up visit after last week's visit with cyclic symptoms.  Patient was seen in the emergency department last week.  Patient had complained of severe throat pain and a CT neck was completed which was negative for any abnormal findings.  Labs are completed which showed a stable elevated alkaline phosphatase however all other labs were unremarkable.  She also had a UA completed which showed trace blood and trace leukocytes.  It was requested that patient undergo stool studies including C. difficile and infectious studies, however patient was unable to give a stool sample during her ER visit.  She did end up leaving A, and today patient states she left because she needed to go take care of her dog at home.     Since her ER visit patient has completed C. difficile lab request which was negative.  She continues to have significant diarrhea stating she is having anywhere from 15-25 episodes per day.  She is having accidents at home and unable to leave her home.  She has significant increase in weakness.  She has lost approximately 6 pounds in the last week.  Vital signs are unremarkable today.  She does note some difficulty with  breathing, however her lungs are clear to auscultation today and she is breathing at 98%.    Interval history 08/23/2022 (MILAD Montoya, APRN):  Patient seen for 1 week follow-up visit post visit with GI as well as treatment for diarrhea.  Patient stated since starting the 40 mg of prednisone last week her diarrhea has significantly improved.  She did use Lomotil approximately 4 times since last week.  She was seen by GI, Dr. Amador on 8/17/2022.  He is ordered imaging as well as stool samples and blood tests.  Discussed with patient she has not had a chance to do the recommended orders but she plans on getting the supplies in order to obtain stool sample and complete the recommended work-up.  In the meantime patient is due to dose reduce down on her prednisone to 20 mg daily.  She did have an episode of diarrhea yesterday but overall she stated she is feeling much better.        Treatment history:  03/26/21: Right lumpectomy and sentinel lymph node biopsy  04/25/21 - 7/7/21: TC x4 cycles (Dr. Valle)  08/18/21 - 09/20/21: XRT - Total of 4 weeks of treatment 3 weeks to the whole breast +5-day boost to the tumor bed because of the extensive lymph vascular vessel invasion and close margin (per Dr. Russell)  ___  01/2022: CT/biopsy consistent with metastatic breast cancer to lung  02/17/22: C1D1 Carbo/[place] with Pembro (Q6 weeks dose)  03/26/22: C2D1 Carbo/Comanche (Comanche dose reduced to 750 mg)  04/18/22: C3D1 Carbo/[place]anche with Pembro (Q6 weeks dose)  04/24/22: C3D8 Carbo/Comanche HELD d/t neutropenia  05/09/22: C4D1 Carbo/Comanche (established with Dr. Bailey)  05/30/22: C5D1 Carbo/Comanche with Pembro (Q6 weeks dose)  06/06/22: C5D8 HELD d/t lack of IV access  06/16/22: C6D1 Carbo/Comanche  07/07/22: C7D1 Pembro ONLY (Q6 weeks dose)  08/18/22: C8D1 Pembro ONLY (Q6 weeks dose) HELD d/t diarrhea  09/15/22: C8D1 Pembro ONLY (Q6 weeks dose) scheduled      Allergies   Allergen Reactions    Sulfa Drugs Vomiting    Toradol Vomiting    Aspirin Vomiting      Severely sick as a child.      Current Outpatient Medications on File Prior to Encounter   Medication Sig Dispense Refill    diphenoxylate-atropine (LOMOTIL) 2.5-0.025 MG Tab Take 1 Tablet by mouth 4 times a day as needed for Diarrhea for up to 40 days. 30 Tablet 0    prochlorperazine (COMPAZINE) 10 MG Tab Take 1 Tablet by mouth every 6 hours as needed (for nausea, vomiting). 30 Tablet 6    ondansetron (ZOFRAN) 4 MG Tab tablet Take 1 Tablet by mouth every four hours as needed for Nausea/Vomiting (for nausea, vomiting). 30 Tablet 6    predniSONE (DELTASONE) 20 MG Tab Take 2 Tablets by mouth every day. Take 2 tabs daily x7 days, followed by 1 tab daily x7 days 21 Tablet 0    spironolactone (ALDACTONE) 25 MG Tab Take 1 Tablet by mouth 2 times a day. 180 Tablet 0    oxyCODONE immediate release (ROXICODONE) 10 MG immediate release tablet Take 10 mg by mouth as needed in the morning and 10 mg as needed at noon and 10 mg as needed in the evening and 10 mg as needed before bedtime for Moderate Pain.      morphine ER (MS CONTIN) 15 MG Tab CR tablet Take 15 mg by mouth every 12 hours.      Naloxone HCl (NARCAN NA) Administer  into affected nostril(S).      acetaminophen (TYLENOL) 500 MG Tab Take 1,000 mg by mouth 2 times a day as needed. Indications: Pain      omeprazole (PRILOSEC) 20 MG delayed-release capsule Take 20 mg by mouth every day.      VENTOLIN  (90 Base) MCG/ACT Aero Soln inhalation aerosol Inhale 2 Puffs 1 time a day as needed.      Multiple Vitamin (MULTIVITAMIN ADULT PO) Take 1 Tablet by mouth every day.      chlorhexidine (PERIDEX) 0.12 % Solution Take 15 mL by mouth in the morning and 15 mL in the evening.      flurazepam (DALMANE) 30 MG capsule Take 30 mg by mouth at bedtime as needed for Sleep.      clonazepam (KLONOPIN) 2 MG tablet Take 2 mg by mouth 3 times a day as needed.      lisinopril (PRINIVIL) 10 MG Tab Take 10 mg by mouth every day.      gabapentin (NEURONTIN) 400 MG Cap Take 400 mg by  "mouth 3 times a day.      ondansetron (ZOFRAN ODT) 4 MG TABLET DISPERSIBLE Take 1 Tab by mouth every 6 hours as needed for Nausea. 20 Tab 0    furosemide (LASIX) 40 MG Tab Take 1 Tablet by mouth every day. (Patient not taking: No sig reported) 30 Tablet 3    fluoxetine (PROZAC) 40 MG capsule Take 40 mg by mouth every day. (Patient not taking: No sig reported)       No current facility-administered medications on file prior to encounter.         Review of Systems   Constitutional:  Positive for weight loss. Negative for chills, fever and malaise/fatigue.   Respiratory:  Negative for cough, shortness of breath and wheezing.    Cardiovascular:  Negative for chest pain and palpitations.   Gastrointestinal:  Positive for diarrhea (still present but significantly improved). Negative for nausea and vomiting.            Objective     BP (!) 139/96   Pulse 81   Temp (!) 35.8 °C (96.4 °F) (Temporal)   Resp 19   Ht 1.626 m (5' 4\")   Wt 87.9 kg (193 lb 14.3 oz)   LMP 11/02/2015   SpO2 97%   BMI 33.28 kg/m²      Physical Exam  Vitals reviewed.   Constitutional:       General: She is not in acute distress.     Appearance: Normal appearance. She is not diaphoretic.   HENT:      Head: Normocephalic and atraumatic.   Cardiovascular:      Rate and Rhythm: Normal rate and regular rhythm.   Pulmonary:      Effort: Pulmonary effort is normal. No respiratory distress.      Breath sounds: Normal breath sounds. No wheezing.   Abdominal:      General: Bowel sounds are normal. There is no distension.      Palpations: Abdomen is soft.      Tenderness: There is no abdominal tenderness.   Neurological:      Mental Status: She is alert and oriented to person, place, and time.   Psychiatric:         Mood and Affect: Mood normal.         Behavior: Behavior normal.                     Assessment & Plan       1. Malignant neoplasm of central portion of right breast in female, estrogen receptor negative (HCC)        2. Functional diarrhea   "              1. Patient with metastatic triple negative Breast Cancer to lung.  Patient was on carboplatin and Gemzar with temozolomide.  Postchemotherapy scan did show complete resolution of left upper lobe lung.  She has been recently changed to maintenance immunotherapy employing Keytruda every 6 weeks.  She received 1 dose of single agent Keytruda last on 7/7/2022.  She is due for her next dose this Thursday.  However due to patient's current clinical status we continue to hold on immunotherapy at this time while we continue to work patient up for her symptoms.  Patient continues on steroid taper, noted decreased dose today at 20 mg x 7 days.  We will continue to hold immunotherapy as patient tapers off steroids as well as completes the full work-up requested by gastroenterologist as discussed above.  Patient is scheduled to follow-up with Dr. Bailey on 9/15/2022 and we will tentatively schedule her back on treatment with Keytruda at that same time.  I discussed with patient that I would like for her to get all of the recommended work-up by GI physician by the time she is scheduled to be seen by Dr. Bailye.  She did verbalize understanding is in agreement with the plan.    2.  Patient with continued diarrhea however significantly improved.  She continues to have Lomotil and take as needed.    3.  Patient to return to clinic in 3 weeks, or sooner if needed.      Please note that this dictation was created using voice recognition software. I have made every reasonable attempt to correct obvious errors, but I expect that there are errors of grammar and possibly content that I did not discover before finalizing the note.

## 2022-08-24 NOTE — ADDENDUM NOTE
Encounter addended by: Chantel Qureshi, Med Ass't on: 8/23/2022 5:28 PM   Actions taken: Charge Capture section accepted

## 2022-08-24 NOTE — PROGRESS NOTES
"Oncology Nurse Navigator on call, Jaky Ibarra received notification requesting patient assistance at the Resource Center in the [facility], [place].  Patient shared she found a \"great site for wigs\" on [employer], [employer], and provided information to MATTHEW Ibarra.  Patient selected 3 sleep caps, paperwork completed.  Patient's questions have been answered and will contact Nurse Navigation for any future assistance or questions.  "

## 2022-08-24 NOTE — PROGRESS NOTES
Pt phones requesting wig/head covering.  Contacted ONN on call, Jaky who met pt in lobby of radiation and escorted her to resource center.

## 2022-09-01 NOTE — TELEPHONE ENCOUNTER
Spoke with ARTURO Childs, regarding her symptoms. Provider has advised pt proceed to the ED in that she may be experiencing symptoms of electrolyte imbalance.     Called pt to discuss her symptoms. Pt states she has not had much diarrhea since coming off of her steroids. She reports one episode every 4 days but states it is not a lot. She confirms she is staying hydrated.    Relayed above APN recommendations, and informed pt ED provider can evaluate  her symptoms, including her rash. Pt has declined APN recommendations but states she will proceed to the ED tomorrow.The risks have been explained to the patient, including life-threatening or worsening illness. Pt has acknowledged understanding.

## 2022-09-02 NOTE — ED NOTES
Per lab, pt requesting art stick in particular artery otherwise they cannot poke her.  Plan to access port.

## 2022-09-02 NOTE — ED NOTES
Patient ambulatory to the restroom and back with a steady gait. She is refusing to wear monitor equipment

## 2022-09-02 NOTE — ED PROVIDER NOTES
ED Provider Note    CHIEF COMPLAINT  Chief Complaint   Patient presents with    Muscle Spasms     X 3 days       HPI  Ashleigh Marquis is a 61 y.o. female with prior hx of breast cancer, recent remission, who presents cramps throughout her body that are intermittent over the past 3 or so days.  No active cramping at this time.  No chest pain or trouble breathing.  No abdominal pain, nausea, vomiting.  Patient is rather ornery and combative and it is very difficult to obtain a precise history from her.    She also notes that she has a rash to her pannus and in the periumbilical region.  Unknown the precise duration.    REVIEW OF SYSTEMS  See HPI for further details. All other systems are negative.     PAST MEDICAL HISTORY   has a past medical history of Alcoholism (Formerly [facility]), Anemia, Anxiety, Anxiety and depression (03/23/2021), Arthritis, ASTHMA, Breath shortness, Bronchitis (01/18/2022), Cancer (Formerly [facility]) (1981), Chronic low back pain, Congestive heart failure (Formerly [facility]), Dental disorder, Depression, EtOH dependence (Formerly [facility]), Fall, GERD (gastroesophageal reflux disease), Heart burn, HTN, Hypertension, Indigestion, Muscle disorder, OSTEOPOROSIS, Other specified symptom associated with female genital organs, Pain (03/23/2021), Pneumonia (01/18/2022), Psychiatric disorder, PTSD (post-traumatic stress disorder), Renal disorder, Renal failure (Around 2019), Seizure (Former[facility]), Seizure (Former[facility]) (11/03/2020), Ulcer, and Vitamin D deficiency.    SOCIAL HISTORY  Social History     Tobacco Use    Smoking status: Every Day     Packs/day: 0.25     Years: 20.00     Pack years: 5.00     Types: Cigarettes    Smokeless tobacco: Former     Types: Chew    Tobacco comments:     1/2 pack per day   Vaping Use    Vaping Use: Never used   Substance and Sexual Activity    Alcohol use: Not Currently     Comment: vodka, heavy use-pt stated she is not drinking as of 3/23    Drug use: Not Currently     Comment: In the past smoked pot    Sexual activity: Never      "Partners: Male       SURGICAL HISTORY   has a past surgical history that includes hernia repair (1977); cysto stent placemnt pre surg (10/7/2010); cystoscopy stent placement (11/9/2010); ureteroscopy (11/9/2010); lasertripsy (11/9/2010); stent removal (11/9/2010); gastroscopy-endo (N/A, 10/3/2018); mastectomy, partial (Right, 3/26/2021); axillary node dissection (Right, 3/26/2021); and other (Left, 2010).    CURRENT MEDICATIONS  Home Medications       Reviewed by Cassie Ramsey R.N. (Registered Nurse) on 09/02/22 at 0641  Med List Status: Not Addressed     Medication Last Dose Status   acetaminophen (TYLENOL) 500 MG Tab  Active   chlorhexidine (PERIDEX) 0.12 % Solution  Active   clonazepam (KLONOPIN) 2 MG tablet  Active   diphenoxylate-atropine (LOMOTIL) 2.5-0.025 MG Tab  Active   fluoxetine (PROZAC) 40 MG capsule  Active   flurazepam (DALMANE) 30 MG capsule  Active   furosemide (LASIX) 40 MG Tab  Active   gabapentin (NEURONTIN) 400 MG Cap  Active   lisinopril (PRINIVIL) 10 MG Tab  Active   morphine ER (MS CONTIN) 15 MG Tab CR tablet  Active   Multiple Vitamin (MULTIVITAMIN ADULT PO)  Active   Naloxone HCl (NARCAN NA)  Active   omeprazole (PRILOSEC) 20 MG delayed-release capsule  Active   ondansetron (ZOFRAN ODT) 4 MG TABLET DISPERSIBLE  Active   ondansetron (ZOFRAN) 4 MG Tab tablet  Active   oxyCODONE immediate release (ROXICODONE) 10 MG immediate release tablet  Active   predniSONE (DELTASONE) 20 MG Tab  Active   prochlorperazine (COMPAZINE) 10 MG Tab  Active   spironolactone (ALDACTONE) 25 MG Tab  Active   VENTOLIN  (90 Base) MCG/ACT Aero Soln inhalation aerosol  Active                    ALLERGIES  Allergies   Allergen Reactions    Sulfa Drugs Vomiting    Toradol Vomiting    Aspirin Vomiting     Severely sick as a child.        PHYSICAL EXAM  VITAL SIGNS: /86   Pulse 100   Temp 36.9 °C (98.4 °F) (Temporal)   Resp 14   Ht 1.626 m (5' 4\")   Wt 87.7 kg (193 lb 5.5 oz)   LMP 11/02/2015   SpO2 " 97%   BMI 33.19 kg/m²   Pulse ox interpretation: I interpret this pulse ox as normal.  Constitutional: Alert in no apparent distress.  HENT: No signs of trauma, Bilateral external ears normal, Nose normal.   Eyes: Pupils are equal and reactive, Conjunctiva normal, Non-icteric.   Neck: Normal range of motion, Supple, No stridor.   Cardiovascular: Regular rate and rhythm.   Thorax & Lungs: Normal breath sounds, No respiratory distress  Abdomen: Bowel sounds normal, Soft, No tenderness, No masses, obese, rash under pannus  Skin: Warm, Dry, No erythema, rash in the folds of her pannus especially in the right lower quadrant.  Rash is erythematous and beefy red with satellite lesions.  Area under the pannus is very moist.  Also some faint rash in the the periumbilical region.  The rashes are consistent with intertrigo/candidiasis.  Musculoskeletal: Good range of motion in all major joints. No major deformities noted.   Neurologic: Alert, No focal deficits noted.   Psychiatric: Agitated      DIAGNOSTIC STUDIES / PROCEDURES      LABS  Labs Reviewed   CBC WITH DIFFERENTIAL - Abnormal; Notable for the following components:       Result Value    RBC 3.92 (*)     .6 (*)     MCH 34.7 (*)     MCHC 32.5 (*)     RDW 55.7 (*)     Lymphocytes 16.70 (*)     All other components within normal limits   COMP METABOLIC PANEL - Abnormal; Notable for the following components:    Alkaline Phosphatase 166 (*)     All other components within normal limits   ESTIMATED GFR       COURSE & MEDICAL DECISION MAKING    Medications   morphine 4 MG/ML injection 4 mg (4 mg Intramuscular Given 9/2/22 0945)       Pertinent Labs & Imaging studies reviewed. (See chart for details)  61 y.o. female presenting with intermittent muscle spasms and painful rash.  She is on chronic continuous use of opioids and she states that this has not been helping at home.  On visual inspection of the patient's rash, it is consistent with intertrigo/candidiasis.  I  "would recommend ketoconazole cream to treat this.    With regards to the patient's muscle spasms, recommended routine blood testing to ensure that she does not have significant metabolic abnormalities that can explain why she has muscle spasms.  No active muscle spasms here or abnormal movements.  Laboratory studies were drawn however prior to the return, the patient left AGAINST MEDICAL ADVICE prior to my reevaluation of the patient and prior to complete discharge.  I did review the patient's laboratory studies however which were largely unremarkable and without any obvious concerning findings to explain her intermittent muscle spasms.  Unfortunately, the patient left prior to my ability to prescribe her medications for the noted intertrigo.      /73   Pulse 90   Temp 36.7 °C (98 °F) (Temporal)   Resp 16   Ht 1.626 m (5' 4\")   Wt 87.7 kg (193 lb 5.5 oz)   LMP 11/02/2015   SpO2 93%   BMI 33.19 kg/m²       FINAL IMPRESSION  Candidiasis of the pannus  Muscle spasms      Electronically signed by: Edwardo Goldstein M.D., 9/2/2022 7:40 AM    "

## 2022-09-02 NOTE — ED NOTES
"In to see patient again, pt on the phone.  When asking the patient, \"can we talk about your care?\", pt turns her head and continues talking on the phone.  Port access supplies obtained but pt has not spoken yet to RN about accessing port.  "

## 2022-09-02 NOTE — ED TRIAGE NOTES
"  Chief Complaint   Patient presents with    Muscle Spasms     X 3 days     Pt states she was advised by her oncologist to come in for muscle spasms that started three days ago. Pt is currently receiving treatment for cancer, placed in family room.    Pt is alert/oriented, following commands, and answering questions appropriately. No acute distress noted in triage and respirations are even and unlabored.   Pt placed in lobby and educated on triage process. Pt encouraged to alert staff for any changes in condition.    ./86   Pulse 100   Temp 36.9 °C (98.4 °F) (Temporal)   Resp 14   Ht 1.626 m (5' 4\")   Wt 87.7 kg (193 lb 5.5 oz)   LMP 11/02/2015   SpO2 97%   BMI 33.19 kg/m²     "

## 2022-09-02 NOTE — ED NOTES
"Labs have been sent.  Pt out to nurse's station, states she is not waiting for the ERP to see her again and demanding a prescription for her rash.  Pt aware prescriptions given on discharge and after full evaluation including ordered blood work.  Pt states she is not staying and waiting for anyone.  Pt asked to return to the room so ERP can be made aware she is leaving.  Pt refuses.  Offered AMA form to sign, refused.  Pt ambulatory from the ED with steady gait yelling that RN \"better make sure he follows up with me and calls me in a prescription\".  Refused d/c vitals.  Pt also requested hospital-paid cab voucher for discharge, states she can pay but doesn't want to.  Pt provided education on cab vouchers.  Pt ambulatory from ED with steady gait.  "

## 2022-09-02 NOTE — TELEPHONE ENCOUNTER
Pt called after leaving AMA from the ED this morning (see ED notes today). Pt requesting medications for her rash and her pain. Per ADRIENNE Mcdonald, pt advised to proceed back to the ED and check herself back in for continued care, this office will not be prescribing anything for her.     Called pt back and left a message on pt identifiable vm with above recommendations.

## 2022-09-15 NOTE — PROGRESS NOTES
Subjective   Medical oncology follow-up: 9/15/2022  Ashleigh Marquis is a 61 y.o. female who presents with Breast Cancer ([facility]wartzberg/ Prechemo)          HPI    Patient seen today in follow-up for symptoms. Patient with known metastatic triple negative Breast Cancer to lung.  Patient presents unaccompanied for today's visit.     Oncology history of presenting illness:  Ashleigh Marquis is a 61 y.o. postmenopausal white female who had a mammogram in January 2021 which showed a mass in the upper outer quadrant of the right breast.  Biopsy showed triple negative breast cancer grade 3.  On 3/26/2021 she underwent a right lumpectomy and sentinel lymph node biopsy.  Pathology demonstrated a 2.9 cm invasive ductal carcinoma, with 3 sentinel lymph nodes negative but extensive lymph vascular invasion.  The Ki-67 was 80%.  She was seen by Dr. Valle for medical oncology and underwent staging work-up which was negative including CT scan of the chest abdomen pelvis.  Se was treated with 4 cycles of TC in April 2021, mainly due to social issues related to the fact that she was living in a shelter at that time.  She underwent radiation therapy to the right breast by Dr. Russell subsequently in August 2021.  In January 2022 she had a CT scan of the chest abdomen pelvis which showed a new 1.3 cm nodule subpleurally in the left upper lobe of the lung.  She underwent a biopsy of this lesion which showed metastatic triple negative breast cancer, PD-L1 CPS score of 10%.  She was started on carboplatin gemcitabine day 1, 8 q. 21 and pembrolizumab 400 mg q. 42 days which she tolerated relatively well with some vomiting.  Gemcitabine dose was reduced to 750 mg starting with cycle 2.  She had repeat imaging of the chest on 4/19/2022 demonstrated clinical complete response with disappearance of the subpleural lung nodule.  Cycle 3-day 8 of carboplatin and gemcitabine were held for asymptomatic neutropenia.  Denies any symptoms  referable to her metastatic disease.  Does have a complicated psychiatric history and substance abuse history.  She was a previous alcoholic and quit drinking in 2020.  She has chronic pain and is managed by a pain specialist Dr. Vigil and his PA Marcelina Harris. She is now living in her own apartment which has helped.    Interval histories:  Interval history 07/07/22 (Carla, APRN):  Patient seen today prior to cycle 7, day 1 of Pembro only.  Patient appears to be tolerating treatment fairly well with expected side effects.  She does complain of lower extremity edema.  She is taking Aldactone 25 mg p.o. twice daily.  She requested a refill couple weeks ago and is scheduled to refill that medication today.  She has been taking it with minimal relief per patient.  She did have very mild edema noted, greater on the left but no pitting noted.  Patient also mentioned that she has been experiencing some aspiration at night.  She complains of wheezing and utilizes an inhaler as needed.  However physical examination today did not reveal any wheezing in all lung fields however she did have some minimal coarse lung sounds throughout.  Her appetite has been stable.  She does have some nausea and vomiting at times but she does have antiemetics with positive results.  I did discuss in detail with patient the treatment recommendations as she has recently completed 6 full cycles of carbo/Gemzar.     Interval history 7/14/2022: She comes in to discuss recent CT chest abdomen pelvis to assess response to 6 cycles of carboplatin and gemcitabine with pembrolizumab.  The CT showed complete resolution of the left upper lobe biopsy-proven metastatic breast cancer nodule.  There is no other evidence of metastatic disease currently.  Her only ongoing problem is peripheral edema which has not gotten better on spironolactone.     Interval History 08/08/22 (Carla, APRN):  Patient presents to office with ongoing symptoms.  Patient complaining  of severe pain on the left side of her throat.  Examination is unremarkable today.  She stated that its not as sick like symptoms but deep inside her throat.  She also is complaining of myalgias and significant weakness.  She has been experiencing headaches.  She did note of fever last week x1.  Vitals today are unremarkable.  Patient has had severe lower extremity edema and has previously required spironolactone as well as Lasix.  She stated that she stopped taking the Lasix as she did not need it anymore.  Patient also noted to have diarrhea in which she has been taking Imodium with no resolution.  She stated that she has been having diarrhea approximately 15 times per day.  Last treatment was 7/7/2022 in which she received immunotherapy, Keytruda only.     Interval History 08/16/22 (ADRIENNE Aguirre)  Patient seen today for 1 week follow-up visit after last week's visit with cyclic symptoms.  Patient was seen in the emergency department last week.  Patient had complained of severe throat pain and a CT neck was completed which was negative for any abnormal findings.  Labs are completed which showed a stable elevated alkaline phosphatase however all other labs were unremarkable.  She also had a UA completed which showed trace blood and trace leukocytes.  It was requested that patient undergo stool studies including C. difficile and infectious studies, however patient was unable to give a stool sample during her ER visit.  She did end up leaving [facility], and today patient states she left because she needed to go take care of her dog at home.     Since her ER visit patient has completed C. difficile lab request which was negative.  She continues to have significant diarrhea stating she is having anywhere from 15-25 episodes per day.  She is having accidents at home and unable to leave her home.  She has significant increase in weakness.  She has lost approximately 6 pounds in the last week.  Vital signs are unremarkable  today.  She does note some difficulty with breathing, however her lungs are clear to auscultation today and she is breathing at 98%.    Interval history 08/23/2022 (ADRIENNE Castillo):  Patient seen for 1 week follow-up visit post visit with GI as well as treatment for diarrhea.  Patient stated since starting the 40 mg of prednisone last week her diarrhea has significantly improved.  She did use Lomotil approximately 4 times since last week.  She was seen by GI, Dr. Amador on 8/17/2022.  He is ordered imaging as well as stool samples and blood tests.  Discussed with patient she has not had a chance to do the recommended orders but she plans on getting the supplies in order to obtain stool sample and complete the recommended work-up.  In the meantime patient is due to dose reduce down on her prednisone to 20 mg daily.  She did have an episode of diarrhea yesterday but overall she stated she is feeling much better.    Interval history 9/15/2022: She had a thorough GI work-up for her diarrhea which was essentially negative.  She has tapered off her prednisone.  Her diarrhea has come back very intermittently.  It is mostly loose stool 2-3 times a day.  Eats a fair amount of meat and butter and we suggested to her increasing fiber in her diet.  He has no symptoms referable to metastatic breast cancer or to toxicity of Keytruda.  She is willing to go back on Keytruda now.  Her biggest problem is muscle spasms which is a longstanding issue.  She has been on Klonopin for this and would like to switch back to Soma which we will do.  Her pain is being managed by her PCP and is under control.        Treatment history:  03/26/21: Right lumpectomy and sentinel lymph node biopsy  04/25/21 - 7/7/21: TC x4 cycles (Dr. Valle)  08/18/21 - 09/20/21: XRT - Total of 4 weeks of treatment 3 weeks to the whole breast +5-day boost to the tumor bed because of the extensive lymph vascular vessel invasion and close margin (per   Drew)  ___  01/2022: CT/biopsy consistent with metastatic breast cancer to lung  02/17/22: C1D1 Carbo/Stryker with Pembro (Q6 weeks dose)  03/26/22: C2D1 Carbo/Stryker (Stryker dose reduced to 750 mg)  04/18/22: C3D1 Carbo/Stryker with Pembro (Q6 weeks dose)  04/24/22: C3D8 Carbo/Stryker HELD d/t neutropenia  05/09/22: C4D1 Carbo/Stryker (established with Dr. Bailey)  05/30/22: C5D1 Carbo/Stryker with Pembro (Q6 weeks dose)  06/06/22: C5D8 HELD d/t lack of IV access  06/16/22: C6D1 Carbo/Stryker  07/07/22: C7D1 Pembro ONLY (Q6 weeks dose)  08/18/22: C8D1 Pembro ONLY (Q6 weeks dose) HELD d/t diarrhea  09/15/22: C8D1 Pembro ONLY (Q6 weeks dose) scheduled      Allergies   Allergen Reactions    Sulfa Drugs Vomiting    Toradol Vomiting    Aspirin Vomiting     Severely sick as a child.      Current Outpatient Medications on File Prior to Encounter   Medication Sig Dispense Refill    diphenoxylate-atropine (LOMOTIL) 2.5-0.025 MG Tab Take 1 Tablet by mouth 4 times a day as needed for Diarrhea for up to 40 days. 30 Tablet 0    prochlorperazine (COMPAZINE) 10 MG Tab Take 1 Tablet by mouth every 6 hours as needed (for nausea, vomiting). 30 Tablet 6    ondansetron (ZOFRAN) 4 MG Tab tablet Take 1 Tablet by mouth every four hours as needed for Nausea/Vomiting (for nausea, vomiting). 30 Tablet 6    predniSONE (DELTASONE) 20 MG Tab Take 2 Tablets by mouth every day. Take 2 tabs daily x7 days, followed by 1 tab daily x7 days 21 Tablet 0    spironolactone (ALDACTONE) 25 MG Tab Take 1 Tablet by mouth 2 times a day. 180 Tablet 0    oxyCODONE immediate release (ROXICODONE) 10 MG immediate release tablet Take 10 mg by mouth as needed in the morning and 10 mg as needed at noon and 10 mg as needed in the evening and 10 mg as needed before bedtime for Moderate Pain.      morphine ER (MS CONTIN) 15 MG Tab CR tablet Take 15 mg by mouth every 12 hours.      Naloxone HCl (NARCAN NA) Administer  into affected nostril(S).      acetaminophen (TYLENOL) 500 MG Tab Take  "1,000 mg by mouth 2 times a day as needed. Indications: Pain      omeprazole (PRILOSEC) 20 MG delayed-release capsule Take 20 mg by mouth every day.      VENTOLIN  (90 Base) MCG/ACT Aero Soln inhalation aerosol Inhale 2 Puffs 1 time a day as needed.      Multiple Vitamin (MULTIVITAMIN ADULT PO) Take 1 Tablet by mouth every day.      chlorhexidine (PERIDEX) 0.12 % Solution Take 15 mL by mouth in the morning and 15 mL in the evening.      flurazepam (DALMANE) 30 MG capsule Take 30 mg by mouth at bedtime as needed for Sleep.      clonazepam (KLONOPIN) 2 MG tablet Take 2 mg by mouth 3 times a day as needed.      lisinopril (PRINIVIL) 10 MG Tab Take 10 mg by mouth every day.      gabapentin (NEURONTIN) 400 MG Cap Take 400 mg by mouth 3 times a day.      ondansetron (ZOFRAN ODT) 4 MG TABLET DISPERSIBLE Take 1 Tab by mouth every 6 hours as needed for Nausea. 20 Tab 0    furosemide (LASIX) 40 MG Tab Take 1 Tablet by mouth every day. (Patient not taking: No sig reported) 30 Tablet 3    fluoxetine (PROZAC) 40 MG capsule Take 40 mg by mouth every day. (Patient not taking: No sig reported)       No current facility-administered medications on file prior to encounter.         Review of Systems   Constitutional:  Positive for weight loss. Negative for chills, fever and malaise/fatigue.   Respiratory:  Negative for cough, shortness of breath and wheezing.    Cardiovascular:  Negative for chest pain and palpitations.   Gastrointestinal:  Positive for diarrhea (still present but significantly improved). Negative for nausea and vomiting.            Objective     Ht 1.626 m (5' 4\")   Wt 84.6 kg (186 lb 9.9 oz)   LMP 11/02/2015   BMI 32.03 kg/m²      Physical Exam  Vitals reviewed.   Constitutional:       General: She is not in acute distress.     Appearance: Normal appearance. She is not diaphoretic.   HENT:      Head: Normocephalic and atraumatic.   Cardiovascular:      Rate and Rhythm: Normal rate and regular rhythm. "   Pulmonary:      Effort: Pulmonary effort is normal. No respiratory distress.      Breath sounds: Normal breath sounds. No wheezing.   Abdominal:      General: Bowel sounds are normal. There is no distension.      Palpations: Abdomen is soft.      Tenderness: There is no abdominal tenderness.   Neurological:      Mental Status: She is alert and oriented to person, place, and time.   Psychiatric:         Mood and Affect: Mood normal.         Behavior: Behavior normal.                     Assessment & Plan       1. Malignant neoplasm of upper-inner quadrant of right breast in female, estrogen receptor negative (HCC)        2. Carcinoma of right breast metastatic to lung (HCC)                1. Patient with metastatic triple negative Breast Cancer to lung.  Patient was on carboplatin and Gemzar with temozolomide.  Postchemotherapy scan did show complete resolution of left upper lobe lung.  She has been recently changed to maintenance immunotherapy employing Keytruda every 6 weeks.  She received 1 dose of single agent Keytruda last on 7/7/2022.  Reinitiate Keytruda 9/15/2022.  2.  Patient with diarrhea intermittent and low-grade.  GI work-up was negative.  She continues to have Lomotil and take as needed.  Encouraged to increase use of fiber in her diet.    3.  Chronic pain syndrome stable  4.  Muscle cramps discontinuing Klonopin and restarting Soma.    Plan: She will continue to keep every 6-week Keytruda.  We will rescan her in mid November to assess her ongoing tumor response and see her back subsequently.  Also check CA 27-29 which has been minimally elevated in the past.      Please note that this dictation was created using voice recognition software. I have made every reasonable attempt to correct obvious errors, but I expect that there are errors of grammar and possibly content that I did not discover before finalizing the note.

## 2022-09-15 NOTE — PROGRESS NOTES
Chemotherapy Verification - SECONDARY RN       Height = 1.62 m  Weight = 84.5 kg  BSA = 1.95 m^2       Medication: pembrolizumab  Dose: 400 mg (set dose)  Calculated Dose: 400 mg (set dose)                             (In mg/m2, AUC, mg/kg)     I confirm that this process was performed independently.

## 2022-09-15 NOTE — PROGRESS NOTES
Chemotherapy Verification - PRIMARY RN      Height = 162 cm  Weight = 84.5 kg  BSA = 1.95 m^2       Medication: Pembrolizumab  Dose: SET DOSE  Calculated Dose: 400 mg (q 6 week dose)                            (In mg/m2, AUC, mg/kg)       I confirm this process was performed independently with the BSA and all final chemotherapy dosing calculations congruent.  Any discrepancies of 10% or greater have been addressed with the chemotherapy pharmacist. The resolution of the discrepancy has been documented in the EPIC progress notes.

## 2022-09-15 NOTE — PROGRESS NOTES
"Pharmacy Chemotherapy Calculations Note:    Dx: [facility]     Cycle 8 - delayed for clinical condition  Previous treatment: C7 = 7/7/22    Protocol: Pembrolizumab + CARBOplatin/Gemcitabine      *Dosing Reference*  Pembrolizumab 400 mg IV over 30 minutes on Day 1               42 day cycle until DP or UT or until up to 24 months of therapy has been completed   CARBOplatin AUC 2 IV over 30 minute on Days 1 and 8   Gemcitabine   IV over 30 minutes on Days 1 and 8   NOTE 3/26/22: Dose reduced to 750 mg/m² with Cycle 2 Day 1 per Dr. Valle               21 day cycle until DP or UT  6 cycles of Carbo/gem completed, single agent pembrolizumab starting C7  NCCN Guidelines for Breast Cancer V.2.2022.   Moncho J, et al. Lancet. 2020;396(38335): 8109-2231.  Risa M, et al. Eur J Cancer. 2020;131:68-75.          BP (!) 152/98   Pulse 93   Temp 36.5 °C (97.7 °F) (Temporal)   Resp 18   Ht 1.62 m (5' 3.78\")   Wt 84.5 kg (186 lb 4.6 oz)   LMP 11/02/2015   SpO2 95%   BMI 32.20 kg/m²  Body surface area is 1.95 meters squared.    All labs (9/15/22) and thyroid panel within treatment plan parameters.      Pembrolizumab (Keytruda) 400 mg fixed dose  No calculation required, okay to treat with final dose = 400 mg IV      Jessica Laws, PharmD  "

## 2022-09-15 NOTE — PROGRESS NOTES
"Pharmacy Chemotherapy Calculation    Dx: metastatic Breast Cancer, triple negative    Protocol: Pembrolizumab + CARBOplatin/Gemcitabine      *Dosing Reference*  Pembrolizumab 400 mg IV over 30 minutes on Day 1   42 day cycle until DP or UT or until up to 24 months of therapy has been completed  ~concurrent with~   CARBOplatin AUC 2 IV over 30 minute on Days 1 and 8   Gemcitabine   IV over 30 minutes on Days 1 and 8    -- NOTE 3/26/22: Dose reduced to 750 mg/m² with Cycle 2 Day 1 per Dr. Valle   21 day cycle  x 6 cycles per MD (completed 6/25/22)  NCCN Guidelines for Breast Cancer V.2.2022.   Moncho J, et al. Lancet. 2020;396(33340): 4492-7697.  Risa M, et al. Eur J Cancer. 2020;131:68-75.    Allergies:  Sulfa drugs, Toradol, and Aspirin     BP (!) 152/98   Pulse 93   Temp 36.5 °C (97.7 °F) (Temporal)   Resp 18   Ht 1.62 m (5' 3.78\")   Wt 84.5 kg (186 lb 4.6 oz)   LMP 11/02/2015   SpO2 95%   BMI 32.20 kg/m²  Body surface area is 1.95 meters squared.    Labs 9/15/22:  ANC~ 4940 Plt = 238k   Hgb = 13.8     SCr = 0.6 mg/dL CrCl ~ 115 mL/min   AST/ALT/ALK  = 23/13/170 TBili = 0.2   TSH = 0.8 Free T4 = 1.06     Drug Order   (Drug name, dose, route, IV Fluid & volume, frequency, number of doses) Cycle 8  Previous treatment: C7 on 7/7/22   Medication = Pembrolizumab (Keytruda)  Base Dose = 400 mg fixed dose   Calc Dose: no calculation required  Final Dose = 400 mg  Route = IV  Fluid & Volume = NS 50 mL  Admin Duration = Over 30 minutes          No calculation required, okay to treat with final dose   By my signature below, I confirm this process was performed independently with the BSA and all final chemotherapy dosing calculations congruent. I have reviewed the above chemotherapy order and that my calculation of the final dose and BSA (when applicable) corroborate those calculations of the  pharmacist. Discrepancies of 10% or greater in the written dose have been addressed and documented within the EPIC " Progress notes.    Kade Clements, PharmD

## 2022-09-16 NOTE — PROGRESS NOTES
Pt presented to infusion for Q 6 week Keytruda. She denied any s/s infection or open wounds. Right chest port accessed in sterile fashion, labs drawn. Pt met parameters for tx. Labs called in to 801 by Angela LEVINE RN per order in tx plan. Keytruda infused without issue. Port flushed, heparinized and de-accessed with needle intact, gauze drsg placed. Emailed schedules for next appt, left on foot to self care with ride from RTC access.

## 2022-09-23 NOTE — TELEPHONE ENCOUNTER
"Patient calling with symptoms:  She can barely breathe.   Very weak, (can barely walk 15 steps without being out of breath).  Nauseous (can't eat).  Diarrhea (even though she's increased her fiber intake - per Dr. Bailey's suggestion).  Can't smoke.   Exhausted.   \"It's scary when you can't breathe\"  "

## 2022-09-24 NOTE — TELEPHONE ENCOUNTER
After hours phone call  Call on 9/23/2022 at 5:17 PM from Ashleigh Marquis  PCP: LEESA Griffith  Onc: Dr. Bailey      Pt reports:  - SOB with activity, improved with inhaler (using her friend's because hers has not been refilled). Started with the smoke (recent poor air quality)  - Hurts to breath and painful  - Diarrhea had resolved with steroids, resumed but is not able to quantify when, fiber is not helping. Had 10-15 bowel movements today - watery and crampy. 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  CACHORRO Santoro  [facility] Hematology/Medical Oncology  Office of [facility], [facility] [place]  Phone: [phone]  Fax: [phone]

## 2022-09-26 NOTE — TELEPHONE ENCOUNTER
Attempted to reach pt by phone to clarify what medication she needed refilled (was not specified in last encounter) & to follow-up on current symptoms.  Pt did not answer, left message to return call to office.

## 2022-09-26 NOTE — TELEPHONE ENCOUNTER
Patient called stating she is still having diarrhea and is needing a refill of the med that was prescribed to her to help her. She states she only has 2 of the pills left. She is asking that this Rx be called into [facility].     Patient also states that she is still having lung pain but she will continue to take the Prednisone. She is asking for a follow up phone call.

## 2022-09-27 NOTE — TELEPHONE ENCOUNTER
Pt returned call to RN stating that she spelled out the name of the medication to the last person she spoke with.  Apologized to pt that she needed to call back to tell us again as it was not provided in message to this RN.  Pt states she is in need of Lomotil refill & that it is helping her with her diarrhea.  Pt reports steroid is giving her energy & compazine is helping her with nausea.  Pt states she ate some toast & a few cookies this morning & is planning on getting her CXR today.  Verified all questions were answered.

## 2022-09-28 NOTE — TELEPHONE ENCOUNTER
"I contacted the patient and gave her the chest x-ray results as requested by ADRIENNE Mcdonald.  I asked the patient how she is doing today and she stated that she \"thinks the steroids are helping\", she has been taking her medications as directed, which she also feels has been helping.  She stated that her \"diarrhea is improving and she had a semi-solid stool\".  She stated that \"she still can't take a deep breath, she still feels weak, and she gets out of breath when walking.  However, she is improving.\"  She voiced relief that her chest x-ray results were good.  "

## 2022-09-28 NOTE — TELEPHONE ENCOUNTER
----- Message from CACHORRO Wynn sent at 9/27/2022  6:59 PM PDT -----  Natty,  Can you let her know her chest xray looked good, no concerning findings      Cain Arriaga since you've followed up with the patient recently  One less call for your to follow up on  ----- Message -----  From: Jose Miguel Radiant In  Sent: 9/27/2022   3:59 PM PDT  To: CACHORRO Wynn

## 2022-10-05 NOTE — TELEPHONE ENCOUNTER
Patient called wanting an appointment with Dr. Bailey to go over the results of her CT scan today, I let her know that he is booked until 10/26 but I will get a message to his medical assistant to see what she can do.  Patient would like a call back

## 2022-10-06 NOTE — TELEPHONE ENCOUNTER
Patient needs refill on her Soma.      Also - she's out of prednisone, and is wondering if Ann-Marie wants her to continue taking it.     She uses [facility].   (For all medication refills)

## 2022-10-08 NOTE — TELEPHONE ENCOUNTER
After Hours Call    Medical Oncologist: Dr. Bailey  Diagnosis: Metastatic breast cancer    Received a call this AM regarding patient's Rx for Soma. Patient filling her Soma earlier than original Rx will allow and she needs the Rx changed in order for pharmacy to fill the medication. She discussed yesterday with the office regarding the need for increased number of Soma since last seen by Dr. Bailey d/t severity of muscle cramps. Dr. LEVINE has written new Rx but she cannot fill it per pharmacist. I reviewed the PDMP and she has not picked up the new Rx just yet. She did however  Klonopin ordered by another provider on 10/3/22. I spoke to patient and she stated she is not taking Klonopin as the Soma is helping at this time and is hoping to be off the Soma and back on Klonopin soon, but with prednisone Rx she has had worsening muscle spasms. She was awakened this morning with muscle spasms and has been awake since 1230 am and unable to walk. She is completely out of Soma for the weekend. I did confirm that Dr. Bailey had intended on refilling the Soma yesterday. I discussed with patient that I will give her enough Soma to get through until Tuesday, 10/11 when she sees Dr. Bailey in the office. His last note on 9/15 it appeared he believed patient has stopped taking Klonopin, but confirmed through PDMP that she filled this medication (however she adamantly stated she is not taking it). I will defer to Dr. Bailey next week to have further discussions with the patient regarding this.     In the meantime I will give her a 4 day supply of Soma at this time.      ADRIENNE Childs  [facility]  Office of Jef Kapadia Menon, and Edwardo

## 2022-10-11 NOTE — PROGRESS NOTES
Subjective   Medical oncology follow-up: 10/11/2022  Ashleigh Marquis is a 61 y.o. female who presents with Breast Cancer (FV scans)          HPI    Patient seen today in follow-up for symptoms. Patient with known metastatic triple negative Breast Cancer to lung.  Patient presents unaccompanied for today's visit.     Oncology history of presenting illness:  Ashleigh Marquis is a 61 y.o. postmenopausal white female who had a mammogram in January 2021 which showed a mass in the upper outer quadrant of the right breast.  Biopsy showed triple negative breast cancer grade 3.  On 3/26/2021 she underwent a right lumpectomy and sentinel lymph node biopsy.  Pathology demonstrated a 2.9 cm invasive ductal carcinoma, with 3 sentinel lymph nodes negative but extensive lymph vascular invasion.  The Ki-67 was 80%.  She was seen by Dr. Valle for medical oncology and underwent staging work-up which was negative including CT scan of the chest abdomen pelvis.  Se was treated with 4 cycles of TC in April 2021, mainly due to social issues related to the fact that she was living in a shelter at that time.  She underwent radiation therapy to the right breast by Dr. Russell subsequently in August 2021.  In January 2022 she had a CT scan of the chest abdomen pelvis which showed a new 1.3 cm nodule subpleurally in the left upper lobe of the lung.  She underwent a biopsy of this lesion which showed metastatic triple negative breast cancer, PD-L1 CPS score of 10%.  She was started on carboplatin gemcitabine day 1, 8 q. 21 and pembrolizumab 400 mg q. 42 days which she tolerated relatively well with some vomiting.  Gemcitabine dose was reduced to 750 mg starting with cycle 2.  She had repeat imaging of the chest on 4/19/2022 demonstrated clinical complete response with disappearance of the subpleural lung nodule.  Cycle 3-day 8 of carboplatin and gemcitabine were held for asymptomatic neutropenia.  Denies any symptoms referable to her  metastatic disease.  Does have a complicated psychiatric history and substance abuse history.  She was a previous alcoholic and quit drinking in 2020.  She has chronic pain and is managed by a pain specialist Dr. Vigil and his PA Marcelina Harris. She is now living in her own apartment which has helped.    Interval histories:  Interval history 07/07/22 (Carla, APRN):  Patient seen today prior to cycle 7, day 1 of Pembro only.  Patient appears to be tolerating treatment fairly well with expected side effects.  She does complain of lower extremity edema.  She is taking Aldactone 25 mg p.o. twice daily.  She requested a refill couple weeks ago and is scheduled to refill that medication today.  She has been taking it with minimal relief per patient.  She did have very mild edema noted, greater on the left but no pitting noted.  Patient also mentioned that she has been experiencing some aspiration at night.  She complains of wheezing and utilizes an inhaler as needed.  However physical examination today did not reveal any wheezing in all lung fields however she did have some minimal coarse lung sounds throughout.  Her appetite has been stable.  She does have some nausea and vomiting at times but she does have antiemetics with positive results.  I did discuss in detail with patient the treatment recommendations as she has recently completed 6 full cycles of carbo/Gemzar.     Interval history 7/14/2022: She comes in to discuss recent CT chest abdomen pelvis to assess response to 6 cycles of carboplatin and gemcitabine with pembrolizumab.  The CT showed complete resolution of the left upper lobe biopsy-proven metastatic breast cancer nodule.  There is no other evidence of metastatic disease currently.  Her only ongoing problem is peripheral edema which has not gotten better on spironolactone.     Interval History 08/08/22 (Carla, APRN):  Patient presents to office with ongoing symptoms.  Patient complaining of severe pain  on the left side of her throat.  Examination is unremarkable today.  She stated that its not as sick like symptoms but deep inside her throat.  She also is complaining of myalgias and significant weakness.  She has been experiencing headaches.  She did note of fever last week x1.  Vitals today are unremarkable.  Patient has had severe lower extremity edema and has previously required spironolactone as well as Lasix.  She stated that she stopped taking the Lasix as she did not need it anymore.  Patient also noted to have diarrhea in which she has been taking Imodium with no resolution.  She stated that she has been having diarrhea approximately 15 times per day.  Last treatment was 7/7/2022 in which she received immunotherapy, Keytruda only.     Interval History 08/16/22 (ADRIENNE Aguirre)  Patient seen today for 1 week follow-up visit after last week's visit with cyclic symptoms.  Patient was seen in the emergency department last week.  Patient had complained of severe throat pain and a CT neck was completed which was negative for any abnormal findings.  Labs are completed which showed a stable elevated alkaline phosphatase however all other labs were unremarkable.  She also had a UA completed which showed trace blood and trace leukocytes.  It was requested that patient undergo stool studies including C. difficile and infectious studies, however patient was unable to give a stool sample during her ER visit.  She did end up leaving [facility], and today patient states she left because she needed to go take care of her dog at home.     Since her ER visit patient has completed C. difficile lab request which was negative.  She continues to have significant diarrhea stating she is having anywhere from 15-25 episodes per day.  She is having accidents at home and unable to leave her home.  She has significant increase in weakness.  She has lost approximately 6 pounds in the last week.  Vital signs are unremarkable today.  She does  note some difficulty with breathing, however her lungs are clear to auscultation today and she is breathing at 98%.    Interval history 08/23/2022 (MILAD Montoya, ADRIENNE):  Patient seen for 1 week follow-up visit post visit with GI as well as treatment for diarrhea.  Patient stated since starting the 40 mg of prednisone last week her diarrhea has significantly improved.  She did use Lomotil approximately 4 times since last week.  She was seen by GI, Dr. Amador on 8/17/2022.  He is ordered imaging as well as stool samples and blood tests.  Discussed with patient she has not had a chance to do the recommended orders but she plans on getting the supplies in order to obtain stool sample and complete the recommended work-up.  In the meantime patient is due to dose reduce down on her prednisone to 20 mg daily.  She did have an episode of diarrhea yesterday but overall she stated she is feeling much better.    Interval history 9/15/2022: She had a thorough GI work-up for her diarrhea which was essentially negative.  She has tapered off her prednisone.  Her diarrhea has come back very intermittently.  It is mostly loose stool 2-3 times a day.  Eats a fair amount of meat and butter and we suggested to her increasing fiber in her diet.  He has no symptoms referable to metastatic breast cancer or to toxicity of Keytruda.  She is willing to go back on Keytruda now.  Her biggest problem is muscle spasms which is a longstanding issue.  She has been on Klonopin for this and would like to switch back to Soma which we will do.  Her pain is being managed by her PCP and is under control.    Interval history 10/11/2022: Her diarrhea has recurred but it is mild in frequency.  She feels like she can tolerate it.  She is using Soma frequently and requested a refill.  She has not used the Klonopin that she did refill.  The scan of the chest abdomen pelvis on the 10/5/2022 showed new subpleural nodule in the anterior left upper lobe which was the  site of the previous nodule.  This now measures 1.4 x 0.9 cm.  There also some vague bilateral patchy groundglass opacities of uncertain significance.  Left supraclavicular node is 9 x 16 mm which are felt to be nonspecific.  No other evidence of disease is noted.  She is on pembrolizumab only.        Treatment history:  03/26/21: Right lumpectomy and sentinel lymph node biopsy  04/25/21 - 7/7/21: TC x4 cycles (Dr. Valle)  08/18/21 - 09/20/21: XRT - Total of 4 weeks of treatment 3 weeks to the whole breast +5-day boost to the tumor bed because of the extensive lymph vascular vessel invasion and close margin (per Dr. Russell)  ___  01/2022: CT/biopsy consistent with metastatic breast cancer to lung  02/17/22: C1D1 Carbo/Anton with Pembro (Q6 weeks dose)  03/26/22: C2D1 Carbo/Anton (Anton dose reduced to 750 mg)  04/18/22: C3D1 Carbo/Anton with Pembro (Q6 weeks dose)  04/24/22: C3D8 Carbo/Anton HELD d/t neutropenia  05/09/22: C4D1 Carbo/Anton (established with Dr. Bailey)  05/30/22: C5D1 Carbo/Anton with Pembro (Q6 weeks dose)  06/06/22: C5D8 HELD d/t lack of IV access  06/16/22: C6D1 Carbo/Anton  07/07/22: C7D1 Pembro ONLY (Q6 weeks dose)  08/18/22: C8D1 Pembro ONLY (Q6 weeks dose) HELD d/t diarrhea  09/15/22: C8D1 Pembro ONLY (Q6 weeks dose) scheduled      Allergies   Allergen Reactions    Sulfa Drugs Vomiting    Toradol Vomiting    Aspirin Vomiting     Severely sick as a child.      Current Outpatient Medications on File Prior to Encounter   Medication Sig Dispense Refill    diphenoxylate-atropine (LOMOTIL) 2.5-0.025 MG Tab Take 1 Tablet by mouth 4 times a day as needed for Diarrhea for up to 14 days. 60 Tablet 0    predniSONE (DELTASONE) 20 MG Tab Take 2 Tablets by mouth every day. Take 2 tabs daily x7 days, followed by 1 tab daily x7 days 21 Tablet 0    VENTOLIN  (90 Base) MCG/ACT Aero Soln inhalation aerosol Inhale 2 Puffs 1 time a day as needed for Shortness of Breath. 8.5 g 0    prochlorperazine (COMPAZINE) 10 MG  Tab Take 1 Tablet by mouth every 6 hours as needed (for nausea, vomiting). 30 Tablet 6    furosemide (LASIX) 40 MG Tab Take 1 Tablet by mouth every day. (Patient not taking: No sig reported) 30 Tablet 3    spironolactone (ALDACTONE) 25 MG Tab Take 1 Tablet by mouth 2 times a day. 180 Tablet 0    oxyCODONE immediate release (ROXICODONE) 10 MG immediate release tablet Take 10 mg by mouth as needed in the morning and 10 mg as needed at noon and 10 mg as needed in the evening and 10 mg as needed before bedtime for Moderate Pain.      morphine ER (MS CONTIN) 15 MG Tab CR tablet Take 15 mg by mouth every 12 hours.      Naloxone HCl (NARCAN NA) Administer  into affected nostril(S).      acetaminophen (TYLENOL) 500 MG Tab Take 1,000 mg by mouth 2 times a day as needed. Indications: Pain      omeprazole (PRILOSEC) 20 MG delayed-release capsule Take 20 mg by mouth every day.      Multiple Vitamin (MULTIVITAMIN ADULT PO) Take 1 Tablet by mouth every day.      chlorhexidine (PERIDEX) 0.12 % Solution Take 15 mL by mouth in the morning and 15 mL in the evening.      flurazepam (DALMANE) 30 MG capsule Take 30 mg by mouth at bedtime as needed for Sleep.      clonazepam (KLONOPIN) 2 MG tablet Take 2 mg by mouth 3 times a day as needed.      lisinopril (PRINIVIL) 10 MG Tab Take 10 mg by mouth every day.      gabapentin (NEURONTIN) 400 MG Cap Take 400 mg by mouth 3 times a day.      fluoxetine (PROZAC) 40 MG capsule Take 40 mg by mouth every day. (Patient not taking: No sig reported)      ondansetron (ZOFRAN ODT) 4 MG TABLET DISPERSIBLE Take 1 Tab by mouth every 6 hours as needed for Nausea. 20 Tab 0     No current facility-administered medications on file prior to encounter.         Review of Systems   Constitutional:  Positive for weight loss. Negative for chills, fever and malaise/fatigue.   HENT: Negative.     Eyes: Negative.    Respiratory: Negative.  Negative for cough, shortness of breath and wheezing.    Cardiovascular:  "Negative.  Negative for chest pain and palpitations.   Gastrointestinal:  Positive for diarrhea (still present but significantly improved). Negative for nausea and vomiting.   Genitourinary: Negative.    Musculoskeletal: Negative.    Skin: Negative.    Neurological: Negative.    Endo/Heme/Allergies: Negative.    Psychiatric/Behavioral: Negative.              Objective     BP (!) 126/97   Pulse (!) 104   Temp 37.1 °C (98.8 °F) (Temporal)   Resp 19   Ht 1.6 m (5' 3\")   Wt 83.7 kg (184 lb 10.2 oz)   LMP 11/02/2015   SpO2 94%   BMI 32.71 kg/m²      Physical Exam  Vitals reviewed.   Constitutional:       General: She is not in acute distress.     Appearance: Normal appearance. She is not diaphoretic.   HENT:      Head: Normocephalic and atraumatic.   Cardiovascular:      Rate and Rhythm: Normal rate and regular rhythm.   Pulmonary:      Effort: Pulmonary effort is normal. No respiratory distress.      Breath sounds: Normal breath sounds. No wheezing.   Abdominal:      General: Bowel sounds are normal. There is no distension.      Palpations: Abdomen is soft.      Tenderness: There is no abdominal tenderness.   Neurological:      Mental Status: She is alert and oriented to person, place, and time.   Psychiatric:         Mood and Affect: Mood normal.         Behavior: Behavior normal.                     Assessment & Plan       1. Carcinoma of right breast metastatic to lung (HCC)  carisoprodol (SOMA) 350 MG Tab      2. Malignant neoplasm of central portion of right breast in female, estrogen receptor negative (HCC)  ondansetron (ZOFRAN) 4 MG Tab tablet    ondansetron (ZOFRAN) 4 MG Tab tablet              1. Patient with metastatic triple negative Breast Cancer to lung.  Patient was on carboplatin and Gemzar with temozolomide.  Postchemotherapy scan did show complete resolution of left upper lobe lung.  She has been recently changed to maintenance immunotherapy employing Keytruda every 6 weeks.  She received 1 dose " of single agent Keytruda last on 7/7/2022.  Reinitiate Keytruda 9/15/2022.  Early recurrence noted on CT of 10/11/2022.  We will continue Keytruda and observe closely.  2.  Patient with diarrhea intermittent and low-grade.  GI work-up was negative.  She continues to have Lomotil and take as needed.  Encouraged to increase use of fiber in her diet.  3.  Chronic pain syndrome stable  4.  Muscle cramps discontinuing Klonopin and restarting Soma.    Plan: She will continue to keep every 6-week Keytruda.  We will see her back in 4 weeks for further evaluation.    Please note that this dictation was created using voice recognition software. I have made every reasonable attempt to correct obvious errors, but I expect that there are errors of grammar and possibly content that I did not discover before finalizing the note.

## 2022-10-11 NOTE — TELEPHONE ENCOUNTER
Patient called stating that her carisoprodol needed a prior authorization and that she needs it done today so she can pick it up.    I let her know that both Dr. Bailey and Chantel have full schedules today but I will relay this message to see when we can get this process started.    Patient agreed and verbalized understanding.

## 2022-10-14 NOTE — TELEPHONE ENCOUNTER
Patient is calling to report that she still has diarrhea.  The prescription that Priscilla Santoro wrote for diphenoxylate-atropine is not really working.  The only thing that worked was the steroids.     Also, her follow up appointment with GI doctor is November 28.    The earliest appointment she could get for a colonoscopy is for December 30.

## 2022-10-14 NOTE — TELEPHONE ENCOUNTER
I attempted to contact the patient at [phone] and had to leave a voicemail.  On the message I stated that our office is currently closed but if she calls back she can ask for the on call provider because it is ADRIENNE Mcdonald this weekend.

## 2022-10-15 NOTE — TELEPHONE ENCOUNTER
"After hours phone call  Call on 10/14/2022 at 5:47 PM from Ashleigh Mercedesroger Marquis  PCP: BYRON Griffith.  Onc: Dr. Bailey        Pt reports:  - persistent diarrhea that is not relieved with lomotil, has not taken imodium - did improve with PO intake; \"felt best on the steroids\"  - poor appetite - has an \"aversion to food\" \"nausea so bad\", compazine not helping, \"nothing is helping\"; GI follow up is at the end of December  - muscle spasms - med is helping a bit but can tell when it wears off, feels weak, is not well nourished, knows it's because \"she hasn't been eating\"  - \"huge abdominal cramps have gone away\"  - needing rest and breaks with activity more often, feels weaker  - spoke at length regarding potential marijuana use vs Marinol; relationship with daughter; recent anxieties and frustrations with diagnosis and current status  - she restarted \"turkey tail mushrooms\" but it causes gas      Plan:  - will try to take 2-3 bites of food every 2-3 hours  - is amenable to Marinol rx, she's a bit scared of MJ right now  - continue with Gatorade, fluids  - hold off on turkey tail mushroom for now      CACHORRO Wynn   [facility]  Office of Sergey Gallagher Thomas, & Lynn  Phone: [phone]  Fax: [phone]      "

## 2022-10-17 NOTE — TELEPHONE ENCOUNTER
Patient called, asking for Priscilla Santoro.    She's feeling sick, fell yesterday.  Still has diarrhea, is weak. She vomited this morning.

## 2022-10-17 NOTE — TELEPHONE ENCOUNTER
Consulted with nAn-Marie DELEON with recommendation for pt to be evaluated in ED for such extreme symptoms as well as now with a ground level fall.  Called pt to inform her of our office's recommendation & pt refusing to go to ED as she is now in bed & has no one to take care of her [place] dog & will try to go to ED tomorrow.  Pt also questioning if Priscilla DELEON will prescribe prednisone for her tomorrow when she returns to the office, RN informed pt that our recommendation was to be seen in ED & Priscilla will be informed of conversation today.  Pt stated no further questions.

## 2022-10-17 NOTE — TELEPHONE ENCOUNTER
"Returned call to pt.  Pt with c/o continuing diarrhea & nausea.  Pt reports she is not eating much & tolerating Gatorade/7-up.  Pt states she feels as thought her body \"is failing\" & pt is requesting to be back on steroid & states that she thinks a steroid will make her feel better.  RN advised pt call for an ambulance to be physically evaluated as pt describes she feels like she is \"dying.\"  Pt states she fell yesterday when she was trying to sit on the toilet & fell on her bottom & then hit her head on the wall, denies loss of consciousness nor visual changes nor vomiting.  Pt states she is too weak to  her Marinol Rx, but was able to \"take a few hits off a joint\" with her daughter, which pt states did not improve appetite much.  Pt refusing to be seen in ED & would prefer a call back after RN speaks with provider.  "

## 2022-10-19 NOTE — TELEPHONE ENCOUNTER
Patient called stating that her [facility] pharmacy does not have the prescription for Prednisone that she was told was called in for her today.  I called [facility]'s pharmacy [phone] and spoke with the pharmacist who stated that they did receive the prescription and they are currently bagging it up for the patient.  I called the patient back at [phone] and let her know that the prescription is ready at the pharmacy.  Patient stated that she wasn't sure if she could make it to the pharmacy because she feels so weak and dizzy.  She stated that she is continuing to have diarrhea and is barely able to eat.  Per the telephone encounter dated 10/17/22, I reminded the patient that the nurse practitioner's recommendation was that she go to be evaluated at the emergency room because her symptoms are so severe.  I asked the patient if she had anyone that could  her prescription or even take her to the emergency room.  She stated that she would ask her daughter to  her prescription but she wasn't sure if her daughter would be willing to do so.  I informed the patient that I would let the nurse practitioner know how she is doing today but that it looked like the recommendation was for her to go to the emergency room if her symptoms are getting worse. Patient did not have any additional questions.  
Patient unable to complete

## 2022-10-19 NOTE — TELEPHONE ENCOUNTER
Per request of Ann-Marie DELEON, called pt to inform her that a Rx was sent over to her pharmacy for prednisone 10 mg PO Daily.  Pt didn't answer, left detailed message regarding Rx & dosing.

## 2022-10-20 PROBLEM — R53.81 DEBILITY: Status: ACTIVE | Noted: 2022-01-01

## 2022-10-20 PROBLEM — N17.0 ACUTE RENAL FAILURE WITH TUBULAR NECROSIS (HCC): Status: ACTIVE | Noted: 2022-01-01

## 2022-10-20 PROBLEM — I95.9 HYPOTENSION: Status: ACTIVE | Noted: 2022-01-01

## 2022-10-20 PROBLEM — A41.9 SEVERE SEPSIS (HCC): Status: ACTIVE | Noted: 2022-01-01

## 2022-10-20 PROBLEM — R65.20 SEVERE SEPSIS (HCC): Status: ACTIVE | Noted: 2022-01-01

## 2022-10-20 NOTE — ASSESSMENT & PLAN NOTE
Secondary to combination of dehydration and lack of oral intake with starvation.  Patient once he started to eat again will need to be monitored for refeeding.

## 2022-10-20 NOTE — PROGRESS NOTES
4 Eyes Skin Assessment Completed by TETO Marquis and TETO Sauceda.    Head WDL  Ears WDL  Nose WDL  Mouth WDL  Neck WDL  Breast/Chest WDL  Shoulder Blades WDL  Spine WDL  (R) Arm/Elbow/Hand Bruising  (L) Arm/Elbow/Hand Bruising  Abdomen WDL  Groin WDL  Scrotum/Coccyx/Buttocks WDL  (R) Leg Scar and Bruising  (L) Leg Bruising  (R) Heel/Foot/Toe WDL  (L) Heel/Foot/Toe WDL          Devices In Places Tele Box      Interventions In Place Pillows and Pressure Redistribution Mattress    Possible Skin Injury Yes    Pictures Uploaded Into Epic Yes  Wound Consult Placed Yes  RN Wound Prevention Protocol Ordered Yes

## 2022-10-20 NOTE — ED NOTES
Pt resting in stretcher with even, unlabored respirations. Remains lethargic, but oriented and able to follow commands. Plan of care reviewed, all questions answered. Call light within reach.

## 2022-10-20 NOTE — ASSESSMENT & PLAN NOTE
Secondary to hypovolemia and dehydration  Give fluid resuscitation monitor sodium levels, most recently improved to 135

## 2022-10-20 NOTE — H&P
Hospital Medicine History & Physical Note    Date of Service  10/20/2022    Primary Care Physician  LEESA Griffith    Consultants  N/A    Code Status  Full Code    Chief Complaint  Chief Complaint   Patient presents with    Hypotension    Diarrhea    Weakness       History of Presenting Illness  Ashleigh Marquis is a 61 y.o. female who presented 10/20/2022 with diarrhea and hypotension.  Patient reports that she has been having diarrhea for months, has been having worsening weakness.  For the last couple weeks she reports everything she has been attempting to eat she has nausea and vomiting.  She reports last night she had at least 10 bowel movements and feels extremely weak.  She has been discussing this with her oncologist, she has a history of triple negative breast cancer and they started her on prednisone since she is on immunotherapy.  She reports previously this did help her symptoms.  Oncology recommended to her to present to the ER for her severe hydration and continued diarrhea.  Patient also reported a ground-level fall denied trauma to the head and loss of consciousness.  Patient reports she has been hot and cold but has not taken her temperature.  Patient's main concern is that she has a Malay at home, she does live alone with her dog and is worried about finding someone to take care of him.  Patient denies chest pain, shortness of breath, melena, dysuria.  Of note patient does have history of C. difficile, she reports that she has had it once before.    In the ER patient hypotensive that responded to fluids, heart rate in the 90s, WBC 11.3, hemoglobin 17, hematocrit 49, sodium 124, potassium 3.5, CO2 12, anion gap 23, creatinine 5.47, GFR 8, magnesium 1.4, negative lactic acid.  UA with moderate bacteria, trace ketones, and 30 protein.  Procalcitonin 1.02 and COVID testing negative.    I discussed the plan of care with patient.    Review of Systems  Review of Systems   Constitutional:  Positive  for chills and malaise/fatigue. Negative for fever.   HENT:  Negative for congestion.    Eyes:  Negative for blurred vision.   Respiratory:  Negative for shortness of breath.    Cardiovascular:  Negative for chest pain.   Gastrointestinal:  Positive for abdominal pain, diarrhea, nausea and vomiting. Negative for blood in stool and melena.   Genitourinary:  Negative for dysuria.   Musculoskeletal:  Positive for falls and myalgias.   Skin:  Negative for rash.   Neurological:  Positive for weakness. Negative for dizziness, loss of consciousness and headaches.   Psychiatric/Behavioral:  Negative for depression.    All other systems reviewed and are negative.    Past Medical History   has a past medical history of Alcoholism (Formerly [facility]), Anemia, Anxiety, Anxiety and depression (03/23/2021), Arthritis, ASTHMA, Breath shortness, Bronchitis (01/18/2022), Cancer (Formerly [facility]) (1981), Chronic low back pain, Congestive heart failure (Formerly [facility]), Dental disorder, Depression, EtOH dependence (Formerly [facility]), Fall, GERD (gastroesophageal reflux disease), Heart burn, HTN, Hypertension, Indigestion, Muscle disorder, OSTEOPOROSIS, Other specified symptom associated with female genital organs, Pain (03/23/2021), Pneumonia (01/18/2022), Psychiatric disorder, PTSD (post-traumatic stress disorder), Renal disorder, Renal failure (Around 2019), Seizure (Formerly [facility]), Seizure (Formerly [facility]) (11/03/2020), Ulcer, and Vitamin D deficiency.    Surgical History   has a past surgical history that includes hernia repair (1977); cysto stent placemnt pre surg (10/7/2010); cystoscopy stent placement (11/9/2010); ureteroscopy (11/9/2010); lasertripsy (11/9/2010); stent removal (11/9/2010); gastroscopy-endo (N/A, 10/3/2018); pr mastectomy, partial (Right, 3/26/2021); axillary node dissection (Right, 3/26/2021); and other (Left, 2010).     Family History  family history includes Cancer in her paternal grandmother; Depression in an other family member; Diabetes in her father; Heart Disease in her  father; Hypertension in her father.   Family history reviewed with patient. There is no family history that is pertinent to the chief complaint.     Social History   reports that she has been smoking cigarettes. She has a 5.00 pack-year smoking history. She has quit using smokeless tobacco.  Her smokeless tobacco use included chew. She reports that she does not currently use alcohol. She reports that she does not currently use drugs.    Allergies  Allergies   Allergen Reactions    Sulfa Drugs Vomiting    Toradol Vomiting    Aspirin Vomiting     Severely sick as a child.        Medications  Prior to Admission Medications   Prescriptions Last Dose Informant Patient Reported? Taking?   HYDROcodone/acetaminophen (NORCO)  MG Tab UNK at [facility] Patient's Home Pharmacy Yes Yes   Sig: Take 1-2 Tablets by mouth 4 times a day as needed for Severe Pain.   Multiple Vitamin (MULTIVITAMIN ADULT PO) UNK at [facility] Historical Yes No   Sig: Take 1 Tablet by mouth every day.   Naloxone HCl (NARCAN NA) PRN at [facility] Historical Yes No   Sig: Administer  into affected nostril(S).   VENTOLIN  (90 Base) MCG/ACT Aero Soln inhalation aerosol PRN at [facility] Patient's Home Pharmacy No No   Sig: Inhale 2 Puffs 1 time a day as needed for Shortness of Breath.   acetaminophen (TYLENOL) 500 MG Tab UNK at [facility] Historical Yes No   Sig: Take 1,000 mg by mouth 2 times a day as needed. Indications: Pain   carisoprodol (SOMA) 350 MG Tab UNK at [facility] Patient's Home Pharmacy No No   Sig: Take 1 Tablet by mouth every 6 hours as needed for Muscle Spasms for up to 28 days.   clonazepam (KLONOPIN) 2 MG tablet UNK at [facility] Patient's Home Pharmacy Yes No   Sig: Take 2 mg by mouth 3 times a day as needed.   diphenoxylate-atropine (LOMOTIL) 2.5-0.025 MG Tab PRN at [facility] Patient's Home Pharmacy Yes Yes   Sig: Take 1 Tablet by mouth 4 times a day as needed for Diarrhea.   lisinopril (PRINIVIL) 10 MG Tab 10/20/2022 at 0500 Patient's Home Pharmacy Yes No   Sig: Take 10 mg by  mouth every day.   omeprazole (PRILOSEC) 40 MG delayed-release capsule 10/20/2022 at 0500 Patient's Home Pharmacy Yes No   Sig: Take 40 mg by mouth every day.   ondansetron (ZOFRAN) 4 MG Tab tablet PRN at [facility] Patient's Home Pharmacy No No   Sig: Take 1 Tablet by mouth every four hours as needed for Nausea/Vomiting (for nausea, vomiting).   oxyCODONE immediate release (ROXICODONE) 10 MG immediate release tablet 10/20/2022 at 0500 Patient's Home Pharmacy Yes No   Sig: Take 10 mg by mouth 6 Times a Day.   predniSONE (DELTASONE) 10 MG Tab 10/20/2022 at 0500 Patient's Home Pharmacy No No   Sig: Take 1 Tablet by mouth every day.   prochlorperazine (COMPAZINE) 10 MG Tab 10/20/2022 at 0500 Patient's Home Pharmacy No No   Sig: Take 1 Tablet by mouth every 6 hours as needed (for nausea, vomiting).      Facility-Administered Medications: None       Physical Exam  Temp:  [35.9 °C (96.7 °F)] 35.9 °C (96.7 °F)  Pulse:  [] 98  Resp:  [10-16] 14  BP: ()/(45-66) 101/63  SpO2:  [91 %-100 %] 100 %  Blood Pressure: 104/66   Temperature: 35.9 °C (96.7 °F)   Pulse: 93   Respiration: 12   Pulse Oximetry: 100 %       Physical Exam  Vitals and nursing note reviewed.   Constitutional:       General: She is not in acute distress.     Appearance: She is ill-appearing and toxic-appearing.      Comments: Mumbles answers   HENT:      Head: Normocephalic and atraumatic.      Right Ear: External ear normal.      Left Ear: External ear normal.      Nose: Nose normal.      Mouth/Throat:      Mouth: Mucous membranes are dry.      Pharynx: Oropharynx is clear.   Eyes:      Extraocular Movements: Extraocular movements intact.      Conjunctiva/sclera: Conjunctivae normal.      Pupils: Pupils are equal, round, and reactive to light.   Cardiovascular:      Rate and Rhythm: Normal rate and regular rhythm.      Pulses: Normal pulses.      Heart sounds: Normal heart sounds.   Pulmonary:      Effort: Pulmonary effort is normal. No respiratory  distress.      Breath sounds: Normal breath sounds. No wheezing or rales.   Abdominal:      General: Abdomen is flat. Bowel sounds are normal. There is no distension.      Palpations: Abdomen is soft.      Tenderness: There is abdominal tenderness. There is guarding.   Musculoskeletal:         General: Normal range of motion.      Cervical back: Normal range of motion and neck supple.      Right lower leg: No edema.      Left lower leg: No edema.   Skin:     General: Skin is warm and dry.   Neurological:      General: No focal deficit present.      Mental Status: She is oriented to person, place, and time and easily aroused. She is lethargic.      Cranial Nerves: No cranial nerve deficit.   Psychiatric:         Thought Content: Thought content normal.       Laboratory:  Recent Labs     10/20/22  0922   WBC 11.3*   RBC 5.29   HEMOGLOBIN 17.0*   HEMATOCRIT 49.9*   MCV 94.3   MCH 32.1   MCHC 34.1   RDW 46.4   PLATELETCT 331   MPV 9.5     Recent Labs     10/20/22  0922   SODIUM 124*   POTASSIUM 3.5*   CHLORIDE 89*   CO2 12*   GLUCOSE 105*   BUN 63*   CREATININE 5.47*   CALCIUM 9.3     Recent Labs     10/20/22  0922   ALTSGPT 14   ASTSGOT 20   ALKPHOSPHAT 266*   TBILIRUBIN 0.4   LIPASE 32   GLUCOSE 105*         No results for input(s): NTPROBNP in the last 72 hours.      No results for input(s): TROPONINT in the last 72 hours.    Imaging:  CT-ABDOMEN-PELVIS W/O   Final Result      1.  Fluid-filled normal caliber loops of small bowel and colon consistent with stated history of diarrhea. No bowel obstruction or acute inflammation.   2.  Moderate hiatal hernia again seen.      DX-CHEST-PORTABLE (1 VIEW)   Final Result         1. No acute cardiopulmonary abnormalities are identified.          X-Ray:  I have personally reviewed the images and compared with prior images.    Assessment/Plan:  Justification for Admission Status  I anticipate this patient will require at least two midnights for appropriate medical management,  necessitating inpatient admission because acute renal failure requiring IV fluid hydration and if does not improve will need nephrology consult, diarrhea requiring stool testing for infectious etiology.    * Hypotension- (present on admission)  Assessment & Plan  Hypotension from GI losses  Improved after IV fluids  Continue IV fluid hydration  PT/OT    Acute renal failure with tubular necrosis (HCC)  Assessment & Plan  Due to severe dehydration and hypovolemia  Aggressive IV fluid hydration  CT abdomen pelvis showed normal-appearing kidneys without hydronephrosis  Avoid nephrotoxic medications, held home lisinopril  If kidney function does not improve, consider consulting nephrology  UA with elevated protein, check urine microalbumin/creatinine ratio, protein/creatinine ratio, urine sodium, urine creatinine  Repeat BMP    Diarrhea  Assessment & Plan  Has a history of C. difficile, she is immunocompromised, start empiric oral vancomycin  CT abdomen pelvis showed no acute infectious/inflammatory colitis changes  Given a dose of Zosyn in the ER  Procalcitonin elevated  C. difficile testing pending  Stool culture pending  O&P pending  IV fluids  Supportive care    High anion gap metabolic acidosis- (present on admission)  Assessment & Plan  Suspect from starvation ketosis,  Has some ketones in the urine, lactic acid negative  Repeat labs pending  Aggressive IV fluid hydration    Debility- (present on admission)  Assessment & Plan  Reports fall at home and weakness  Due to severe GI losses  IV fluids  PT/OT  Consulted nutrition  Antiemetics prn     Malignant neoplasm of central portion of breast in female, estrogen receptor negative (HCC)- (present on admission)  Assessment & Plan  Follows with Dr. Rincon, oncology    Hyponatremia  Assessment & Plan  Due to severe dehydration  IV fluids  Repeat BMP    Hypomagnesemia  Assessment & Plan  Replace as needed        VTE prophylaxis: SCDs/TEDs and heparin ppx

## 2022-10-20 NOTE — PROGRESS NOTES
Patient stated having home medications with her. Per ED report, patient has oxy with her. Patient refused to give medications to this RN to place in safe keeping with pharmacy. MD notified and charge nurse notified. Patient educated on the risk of having home meds in room, patient verbalizes understanding but still refuses to hand over medications.

## 2022-10-20 NOTE — ED PROVIDER NOTES
ED Provider Note    CHIEF COMPLAINT  Chief Complaint   Patient presents with    Hypotension    Diarrhea    Weakness       HPI  Ashleigh Marquis is a 61 y.o. female who presents to the emergency department with complaint of nausea, vomiting, diarrhea for the last 5 months and generalized weakness.  She has a history of stage IV breast cancer and underwent chemotherapy and radiation his past.  She states that she is at increasing nausea and vomiting and diarrhea for several months but now the last few days she has been feeling extremely weak and tired.  Denies hematochezia, melena, hematemesis, fever, profound abdominal pain.  She states she is so weak that she can no longer walk and cannot or drink.  Take chronic pain medication and states she missed her morning dose.  She states she is unable to tell if she is making urine as when she has diarrhea her urine comes at the same time.  EMS arrived, she had a blood pressure in the 60s systolic and ID was attempted without success.  She did not receive any fluids prior to arrival.    REVIEW OF SYSTEMS  Positives as above. Pertinent negatives include EVAR, hematochezia, melena, hematemesis.  All other 10 review of systems are negative    PAST MEDICAL HISTORY  Past Medical History:   Diagnosis Date    Alcoholism (HCC)     Anemia     pt states she doesn't think it is a current issue    Anxiety     Anxiety and depression 03/23/2021    Arthritis     osteo-legs, knees    ASTHMA     Inhaler as needed.     Breath shortness     On exertion.     Bronchitis 01/18/2022    In the past    Cancer (HCC) 1981    cervical    Chronic low back pain     Congestive heart failure (HCC)     1/18/22:  pt states she is not sure.     Dental disorder     upper and lower dentures. 1/18/22: Pt. denies    Depression     EtOH dependence (HCC)     Fall     alcohol related    GERD (gastroesophageal reflux disease)     Heart burn     HTN     Hypertension     Indigestion     GERD    Muscle disorder      "OSTEOPOROSIS     Other specified symptom associated with female genital organs     \"I have fibroids bad\"\"    Pain 03/23/2021    breast, legs, joints    Pneumonia 01/18/2022    In the past    Psychiatric disorder     PTSD (post-traumatic stress disorder)     Renal disorder     Hx of stones    Renal failure Around 2019    One time related to heeavily drinking per pt.     Seizure (HCC)     several years ago r/t alcohol withdrawl    Seizure (HCC) 11/03/2020    last seizure was 11/2020    Ulcer     Vitamin D deficiency        FAMILY HISTORY  Noncontributory    SOCIAL HISTORY  Social History     Socioeconomic History    Marital status:    Tobacco Use    Smoking status: Every Day     Packs/day: 0.25     Years: 20.00     Pack years: 5.00     Types: Cigarettes    Smokeless tobacco: Former     Types: Chew    Tobacco comments:     1/2 pack per day   Vaping Use    Vaping Use: Never used   Substance and Sexual Activity    Alcohol use: Not Currently     Comment: vodka, heavy use-pt stated she is not drinking as of 3/23    Drug use: Not Currently     Comment: In the past smoked pot    Sexual activity: Never     Partners: Male   Social History Narrative    ** Merged History Encounter **            SURGICAL HISTORY  Past Surgical History:   Procedure Laterality Date    PB MASTECTOMY, PARTIAL Right 3/26/2021    Procedure: MASTECTOMY, PARTIAL - ESTEBAN LOCALIZED;  Surgeon: Janice Knowles M.D.;  Location: SURGERY SAME DAY [facility];  Service: General    AXILLARY NODE DISSECTION Right 3/26/2021    Procedure: LYMPHADENECTOMY, AXILLARY.;  Surgeon: Janice Knowles M.D.;  Location: SURGERY SAME DAY [facility];  Service: General    GASTROSCOPY-ENDO N/A 10/3/2018    Procedure: GASTROSCOPY-ENDO;  Surgeon: Ben Negro M.D.;  Location: ENDOSCOPY [facility];  Service: Gastroenterology    CYSTOSCOPY STENT PLACEMENT  11/9/2010    Performed by ANGEL HARRIS at SURGERY [facility]    URETEROSCOPY  11/9/2010    Performed by " "ANGEL HARRIS at SURGERY [facility]    LASERTRIPSY  11/9/2010    Performed by ANGEL HARRIS at SURGERY [facility]    STENT REMOVAL  11/9/2010    Performed by ANGEL HARRIS at SURGERY [facility]    CYSTO STENT PLACEMNT PRE SURG  10/7/2010    Performed by ANGEL HARRIS at SURGERY [facility]    OTHER Left 2010    Leg    HERNIA REPAIR  1977       CURRENT MEDICATIONS  Home Medications       Reviewed by Vanessa Juarez PhT (Pharmacy Tech) on 10/20/22 at 1012  Med List Status: Complete     Medication Last Dose Status   acetaminophen (TYLENOL) 500 MG Tab UNK Active   carisoprodol (SOMA) 350 MG Tab UNK Active   clonazepam (KLONOPIN) 2 MG tablet UNK Active   diphenoxylate-atropine (LOMOTIL) 2.5-0.025 MG Tab PRN Active   HYDROcodone/acetaminophen (NORCO)  MG Tab UNK Active   lisinopril (PRINIVIL) 10 MG Tab 10/20/2022 Active   Multiple Vitamin (MULTIVITAMIN ADULT PO) UNK Active   Naloxone HCl (NARCAN NA) PRN Active   omeprazole (PRILOSEC) 40 MG delayed-release capsule 10/20/2022 Active   ondansetron (ZOFRAN) 4 MG Tab tablet PRN Active   oxyCODONE immediate release (ROXICODONE) 10 MG immediate release tablet 10/20/2022 Active   predniSONE (DELTASONE) 10 MG Tab 10/20/2022 Active   prochlorperazine (COMPAZINE) 10 MG Tab 10/20/2022 Active   VENTOLIN  (90 Base) MCG/ACT Aero Soln inhalation aerosol PRN Active                    ALLERGIES  Allergies   Allergen Reactions    Sulfa Drugs Vomiting    Toradol Vomiting    Aspirin Vomiting     Severely sick as a child.        PHYSICAL EXAM  VITAL SIGNS: /63   Pulse 98   Temp 35.9 °C (96.7 °F) (Temporal)   Resp 14   Ht 1.6 m (5' 3\")   Wt 83.3 kg (183 lb 11.2 oz)   LMP 11/02/2015   SpO2 100%   BMI 32.54 kg/m²      Constitutional: Well developed, Well nourished, moderate distress non-toxic appearance.   HENT: Dry mucous membranes  Eyes: PERRLA, EOMI, Conjunctiva normal, No discharge.   Cardiovascular: Normal heart rate, Normal rhythm, No " murmurs, No rubs, No gallops, and intact distal pulses.   Thorax & Lungs:  No respiratory distress, no rales, no rhonchi, No wheezing, No chest wall tenderness.   Abdomen: Bowel sounds normal, Soft, slight diffuse abdominal tenderness, No guarding, No rebound, No pulsatile masses.   Skin: Warm, Dry, No erythema, skin tenting, no rash.   Extremities: Full range of motion, no deformity, no edema.  Neurologic: Alert & oriented x 3 year per family slurred speech, difficulty to understand,, No focal deficits noted, acting appropriately on exam.  Psychiatric: Affect normal for clinical presentation.      LABORATORY/ECG  Results for orders placed or performed during the hospital encounter of 10/20/22   Lactic acid (lactate)   Result Value Ref Range    Lactic Acid 1.4 0.5 - 2.0 mmol/L   CBC With Differential   Result Value Ref Range    WBC 11.3 (H) 4.8 - 10.8 K/uL    RBC 5.29 4.20 - 5.40 M/uL    Hemoglobin 17.0 (H) 12.0 - 16.0 g/dL    Hematocrit 49.9 (H) 37.0 - 47.0 %    MCV 94.3 81.4 - 97.8 fL    MCH 32.1 27.0 - 33.0 pg    MCHC 34.1 33.6 - 35.0 g/dL    RDW 46.4 35.9 - 50.0 fL    Platelet Count 331 164 - 446 K/uL    MPV 9.5 9.0 - 12.9 fL    Neutrophils-Polys 80.10 (H) 44.00 - 72.00 %    Lymphocytes 7.20 (L) 22.00 - 41.00 %    Monocytes 11.40 0.00 - 13.40 %    Eosinophils 0.30 0.00 - 6.90 %    Basophils 0.40 0.00 - 1.80 %    Immature Granulocytes 0.60 0.00 - 0.90 %    Nucleated RBC 0.00 /100 WBC    Neutrophils (Absolute) 9.02 (H) 2.00 - 7.15 K/uL    Lymphs (Absolute) 0.81 (L) 1.00 - 4.80 K/uL    Monos (Absolute) 1.28 (H) 0.00 - 0.85 K/uL    Eos (Absolute) 0.03 0.00 - 0.51 K/uL    Baso (Absolute) 0.05 0.00 - 0.12 K/uL    Immature Granulocytes (abs) 0.07 0.00 - 0.11 K/uL    NRBC (Absolute) 0.00 K/uL   Comp Metabolic Panel   Result Value Ref Range    Sodium 124 (L) 135 - 145 mmol/L    Potassium 3.5 (L) 3.6 - 5.5 mmol/L    Chloride 89 (L) 96 - 112 mmol/L    Co2 12 (L) 20 - 33 mmol/L    Anion Gap 23.0 (H) 7.0 - 16.0    Glucose  105 (H) 65 - 99 mg/dL    Bun 63 (H) 8 - 22 mg/dL    Creatinine 5.47 (HH) 0.50 - 1.40 mg/dL    Calcium 9.3 8.4 - 10.2 mg/dL    AST(SGOT) 20 12 - 45 U/L    ALT(SGPT) 14 2 - 50 U/L    Alkaline Phosphatase 266 (H) 30 - 99 U/L    Total Bilirubin 0.4 0.1 - 1.5 mg/dL    Albumin 4.3 3.2 - 4.9 g/dL    Total Protein 8.3 (H) 6.0 - 8.2 g/dL    Globulin 4.0 (H) 1.9 - 3.5 g/dL    A-G Ratio 1.1 g/dL   Urinalysis    Specimen: Urine   Result Value Ref Range    Color Dark Yellow     Character Hazy (A)     Specific Gravity >=1.030 <1.035    Ph 5.0 5.0 - 8.0    Glucose Negative Negative mg/dL    Ketones Trace (A) Negative mg/dL    Protein 30 (A) Negative mg/dL    Bilirubin Small (A) Negative    Nitrite Negative Negative    Leukocyte Esterase Trace (A) Negative    Occult Blood Negative Negative    Micro Urine Req Microscopic    MAGNESIUM   Result Value Ref Range    Magnesium 1.4 (L) 1.5 - 2.5 mg/dL   PHOSPHORUS   Result Value Ref Range    Phosphorus 8.5 (H) 2.5 - 4.5 mg/dL   CoV-2, FLU A/B, and RSV by PCR (2-4 Hours CEPHEID) : Collect NP swab in VTM    Specimen: Respirate   Result Value Ref Range    Influenza virus A RNA Negative Negative    Influenza virus B, PCR Negative Negative    RSV, PCR Negative Negative    SARS-CoV-2 by PCR NotDetected     SARS-CoV-2 Source Nasal Swab    Sed Rate   Result Value Ref Range    Sed Rate Westergren 1 0 - 25 mm/hour   CRP QUANTITIVE (NON-CARDIAC)   Result Value Ref Range    Stat C-Reactive Protein 4.01 (H) 0.00 - 0.75 mg/dL   PROCALCITONIN   Result Value Ref Range    Procalcitonin 1.02 (H) <0.25 ng/mL   URINE MICROSCOPIC (W/UA)   Result Value Ref Range    WBC 2-5 /hpf    RBC Rare /hpf    Bacteria Moderate (A) None /hpf    Trans Epithelial Cells Few /hpf    Mucous Threads Moderate /hpf    Urine Crystals Few Amorphous /hpf    Hyaline Cast >10 (A) /lpf   ESTIMATED GFR   Result Value Ref Range    GFR (CKD-EPI) 8 (A) >60 mL/min/1.73 m 2   LIPASE   Result Value Ref Range    Lipase 32 7 - 58 U/L          RADIOLOGY/PROCEDURES  CT-ABDOMEN-PELVIS W/O   Final Result      1.  Fluid-filled normal caliber loops of small bowel and colon consistent with stated history of diarrhea. No bowel obstruction or acute inflammation.   2.  Moderate hiatal hernia again seen.      DX-CHEST-PORTABLE (1 VIEW)   Final Result         1. No acute cardiopulmonary abnormalities are identified.            COURSE & MEDICAL DECISION MAKING  Pertinent Labs & Imaging studies reviewed. (See chart for details)  This is a pleasant 61-year-old female presents with profound dehydration and acute kidney injury.  Here in the emergency department, her blood pressure was 69/47 IV was established and she was started on 30 mils per kilogram of fluid resuscitation.  The patient did respond to fluid administration as her blood pressure slowly did increase to her normal blood pressure.  Patient was found have a creatinine of 5.47, BUN of 63, glucose of 104, anion gap of 23, CO2 of 12, chloride of 89, potassium of 3.5 and sodium 124.  Patient received IV fluids and for 30 mils per kilogram.  Following IV fluid ministration, patient went to CT scan for possible occult infection such as cholecystitis, diverticulitis, small bowel obstruction.  CT scan was negative for acute infectious etiology.  The patient does not have a surgical condition.  I have discussed the patient with Dr. Murphy who graciously excepts hospitalization of this patient for fluid resuscitation, nausea and vomiting control.      FINAL IMPRESSION  Intractable nausea and vomiting  Dehydration  Acute kidney injury           Electronically signed by: Evaristo Wheeler D.O., 10/20/2022 1:00 PM

## 2022-10-20 NOTE — ASSESSMENT & PLAN NOTE
Ongoing diarrhea which has been chronic may be secondary to immunosuppressive treatment  History of C. difficile colitis and also on oral vancomycin empirically  Gastroenterology consulted  Colonoscopy today  Await recommendations from gastroenterology after colonoscopy for possibly treating diarrhea with medication management  Rectal tube in place for now  Stool studies pending

## 2022-10-20 NOTE — ED NOTES
Pharmacy Medication Reconciliation      ~Medication reconciliation updated and complete per patient at bedside & patient home pharmacy  ~Allergies have been verified and updated   ~No oral ABX within the last 30 days  ~Patient home pharmacy :  [facility]

## 2022-10-20 NOTE — ASSESSMENT & PLAN NOTE
Continue with physical therapy and Occupational Therapy evaluation  Possible need for skilled placement

## 2022-10-20 NOTE — ED TRIAGE NOTES
Pt arrives with EMS from home for diarrhea x 5 months, generalized weakness. Hx stage 4 breast cancer (chemo and radiation). Pt hypotensive with EMS. BP on arrival 69/47. ERP bedside. Attempting PIV access. Pt lethargic, but answering questions appropriately.

## 2022-10-20 NOTE — ASSESSMENT & PLAN NOTE
Secondary to dehydration and hypovolemia  Continue with fluid resuscitation  Monitor blood pressure  Most recent blood pressure 123/71

## 2022-10-20 NOTE — ASSESSMENT & PLAN NOTE
Secondary to hypovolemia  Give fluid resuscitation and monitor renal functions  Avoid nephrotoxic medications

## 2022-10-20 NOTE — TELEPHONE ENCOUNTER
Please see notes from office RN as well as office MA regarding patient's complaints.  Contacted patient this evening based on complaints from office staff that patient is experiencing extreme fatigue, still having diarrhea and has fallen due to the weakness and fatigue.  Contacted patient and personally spoke to her this evening regarding her symptoms.  She sounds quite fatigued and slightly sedated.  She stated she has no energy to do anything.  She is having continuous diarrhea throughout the day that is quite watery.  She has had this previously and being on immunotherapy we have previously treated her with prednisone with positive results.    I did speak with Dr. Rincon regarding patient's symptoms and he did agree that a low-dose prednisone at 10 mg p.o. daily may be helpful to keep her diarrhea under control so that we can continue to treat patient with immunotherapy.  However, based on her current clinical symptoms I very strongly encouraged patient to call 911 and be brought into the hospital for further evaluation.  I discussed with patient that she needs lab work to be completed she should be evaluated for dehydration as well as her diarrhea.  Patient continues to state that she would do this tomorrow.  Continue to strongly encourage her to call 911 this evening and be brought to the emergency department based on her symptoms.    Patient was appreciative of the phone call.

## 2022-10-21 NOTE — PROGRESS NOTES
Telemetry Shift Summary    Rhythm SR  HR Range 89-95  Ectopy rPAC, rPVC  Measurements .16/.08/.36        Normal Values  Rhythm SR  HR Range    Measurements 0.12-0.20 / 0.06-0.10  / 0.30-0.52

## 2022-10-21 NOTE — THERAPY
Missed Therapy     Patient Name: Ashleigh Marquis  Age:  61 y.o., Sex:  female  Medical Record #: [id number]  Today's Date: 10/21/2022    Discussed missed therapy with Nursing       10/21/22 1059   Interdisciplinary Plan of Care Collaboration   IDT Collaboration with  Nursing   Collaboration Comments Therapist attempted OT eval - patient refused.  Will attempt later.

## 2022-10-21 NOTE — PROGRESS NOTES
Lizeth from Lab called with critical result of K 2.6 at 0834. Critical lab result read back to Lizeth.     Dr. Murphy notified of critical lab result at 0840. Critical lab result read back by Dr. Murphy.

## 2022-10-21 NOTE — PROGRESS NOTES
Patient port accessed per nursing communicated. Sterile technique maintained. Patient tolerated procedure with no complaints. Blood return noted. Needed labs collected.

## 2022-10-21 NOTE — PROGRESS NOTES
Hospital Medicine Daily Progress Note    Date of Service  10/21/2022    Chief Complaint  Ashleigh Marquis is a 61 y.o. female admitted 10/20/2022 with diarrhea.     Hospital Course  61 y.o. female who presented 10/20/2022 with diarrhea and hypotension.  Patient reports that she has been having diarrhea for months, has been having worsening weakness.  For the last couple weeks she reports everything she has been attempting to eat she has nausea and vomiting.  She reports last night she had at least 10 bowel movements and feels extremely weak.  She has been discussing this with her oncologist, she has a history of triple negative breast cancer and they started her on prednisone since she is on immunotherapy.  She reports previously this did help her symptoms. Oncology recommended to her to present to the ER for her severe hydration and continued diarrhea.  Patient also reported a ground-level fall denied trauma to the head and loss of consciousness. Of note patient does have history of C. Difficile. In the ER patient hypotensive that responded to fluids. Acute renal failure, with hyponatremia and hypokalemia. Procalcitonin 1.02 and COVID testing negative. Patient started on empiric oral vancomycin, c diff testing pending.    Interval Problem Update  BP improving this morning.  Leukocytosis resolved.  Renal function with significant improvement creatinine 1.11 this morning.  Potassium low oral and IV replacement ordered.  Magnesium still less than 2, IV magnesium ordered.  Gaped acidosis resolved. C diff testing and stool culture pending.  Patient documented to have at least 7 large watery yellow stools last night.  Rectal tube ordered.  If stool testing is negative, patient will likely need a GI consult, previous lactoferrin was elevated.  Called and spoke with patient's daughter, Katalina, on the phone, explained results thus far and plan of care.     I have discussed this patient's plan of care and discharge plan  at IDT rounds today with Case Management, Nursing, Nursing leadership, and other members of the IDT team.    Consultants/Specialty  N/A    Code Status  Full Code    Disposition  Patient is not medically cleared for discharge.   Anticipate discharge to to home with close outpatient follow-up.  I have placed the appropriate orders for post-discharge needs.    Review of Systems  Review of Systems   Constitutional:  Positive for malaise/fatigue. Negative for chills and fever.   Respiratory:  Negative for shortness of breath.    Cardiovascular:  Negative for chest pain.   Gastrointestinal:  Positive for abdominal pain and diarrhea. Negative for nausea and vomiting.   Genitourinary:  Negative for dysuria.   Musculoskeletal:  Positive for falls. Negative for myalgias.   Skin:  Positive for rash.   Neurological:  Positive for weakness. Negative for dizziness and headaches.   Psychiatric/Behavioral:  Negative for depression.    All other systems reviewed and are negative.     Physical Exam  Temp:  [35.9 °C (96.7 °F)-36.8 °C (98.3 °F)] 36.8 °C (98.3 °F)  Pulse:  [] 89  Resp:  [10-19] 18  BP: ()/(45-76) 114/72  SpO2:  [91 %-100 %] 94 %    Physical Exam  Vitals and nursing note reviewed.   Constitutional:       General: She is not in acute distress.     Appearance: She is ill-appearing.      Comments: Talking more clearly today   HENT:      Head: Normocephalic and atraumatic.      Mouth/Throat:      Mouth: Mucous membranes are dry.      Pharynx: Oropharynx is clear.   Eyes:      Conjunctiva/sclera: Conjunctivae normal.   Cardiovascular:      Rate and Rhythm: Normal rate and regular rhythm.      Pulses: Normal pulses.      Heart sounds: Normal heart sounds.   Pulmonary:      Effort: Pulmonary effort is normal. No respiratory distress.      Breath sounds: Normal breath sounds. No wheezing or rales.   Abdominal:      General: Abdomen is flat. There is no distension.      Palpations: Abdomen is soft.      Tenderness:  There is abdominal tenderness. There is no guarding.   Musculoskeletal:         General: Normal range of motion.      Cervical back: Normal range of motion and neck supple.      Right lower leg: No edema.      Left lower leg: No edema.   Skin:     General: Skin is warm and dry.      Comments: Erythema right groin fold    Neurological:      General: No focal deficit present.      Mental Status: She is alert and oriented to person, place, and time.      Cranial Nerves: No cranial nerve deficit.      Motor: Weakness present.   Psychiatric:         Behavior: Behavior normal.       Fluids    Intake/Output Summary (Last 24 hours) at 10/21/2022 0911  Last data filed at 10/20/2022 1315  Gross per 24 hour   Intake 1572 ml   Output 300 ml   Net 1272 ml       Laboratory  Recent Labs     10/20/22  0922 10/21/22  0754   WBC 11.3* 7.7   RBC 5.29 4.37   HEMOGLOBIN 17.0* 14.2   HEMATOCRIT 49.9* 40.7   MCV 94.3 93.1   MCH 32.1 32.5   MCHC 34.1 34.9   RDW 46.4 46.0   PLATELETCT 331 253   MPV 9.5 9.7     Recent Labs     10/20/22  0922 10/20/22  1803 10/21/22  0754   SODIUM 124* 127* 132*   POTASSIUM 3.5* 2.9* 2.6*   CHLORIDE 89* 100 106   CO2 12* 12* 13*   GLUCOSE 105* 91 92   BUN 63* 54* 44*   CREATININE 5.47* 2.25* 1.11   CALCIUM 9.3 8.4 9.1     Recent Labs     10/20/22  0922   INR 1.09               Imaging  CT-ABDOMEN-PELVIS W/O   Final Result      1.  Fluid-filled normal caliber loops of small bowel and colon consistent with stated history of diarrhea. No bowel obstruction or acute inflammation.   2.  Moderate hiatal hernia again seen.      DX-CHEST-PORTABLE (1 VIEW)   Final Result         1. No acute cardiopulmonary abnormalities are identified.           Assessment/Plan  * Hypotension- (present on admission)  Assessment & Plan  Hypotension from GI losses  Improving with IV fluids  Continue IV fluid hydration as pt still having multiple stools  PT/OT    Acute renal failure with tubular necrosis (HCC)  Assessment & Plan  Due to  severe dehydration and hypovolemia  Aggressive IV fluid hydration  CT abdomen pelvis showed normal-appearing kidneys without hydronephrosis  Avoid nephrotoxic medications, held home lisinopril  If kidney function does not improve, consider consulting nephrology  UA with elevated protein, check urine microalbumin/creatinine ratio, protein/creatinine ratio, urine sodium, urine creatinine    improving    Diarrhea  Assessment & Plan  Has a history of C. difficile, she is immunocompromised, start empiric oral vancomycin  CT abdomen pelvis showed no acute infectious/inflammatory colitis changes  Given a dose of Zosyn in the ER  Procalcitonin elevated  C. difficile testing pending  Stool culture pending  O&P pending  If testing above negative will consider GI consult   -lactoferrin elevated previously, continue outpt prednisone   Rectal tube to be placed  IV fluids  Supportive care    High anion gap metabolic acidosis- (present on admission)  Assessment & Plan  Suspect from starvation ketosis,  Has some ketones in the urine, lactic acid negative  Repeat labs pending  Aggressive IV fluid hydration    Anion gap resolved, suspect acidosis now from high chloride level, change IVF to LR     Debility- (present on admission)  Assessment & Plan  Reports fall at home and weakness  Due to severe GI losses  IV fluids  PT/OT  Consulted nutrition  Antiemetics prn     Malignant neoplasm of central portion of breast in female, estrogen receptor negative (HCC)- (present on admission)  Assessment & Plan  Follows with Dr. Rincon, oncology    Hyponatremia  Assessment & Plan  Due to severe dehydration  IV fluids  Repeat BMP    Improving     Hypomagnesemia  Assessment & Plan  Replace as needed    Hypokalemia  Assessment & Plan  Severe 2/2 ongoing gi losses   IV replacement   Start KCl oral BID   Telemetry monitoring        VTE prophylaxis: SCDs/TEDs and heparin ppx    I have performed a physical exam and reviewed and updated ROS and Plan today  (10/21/2022). In review of yesterday's note (10/20/2022), there are no changes except as documented above.

## 2022-10-21 NOTE — CONSULTS
Gastroenterology Consult Note:    CACHORRO Augustine  Date & Time note created:    10/21/2022   4:05 PM     Referring MD:  Dr. Joseline Murphy    Patient ID:  Name:             Ashleigh Marquis   YOB: 1960  Age:                 61 y.o.  female   MRN:               [id number]                                                             Reason for Consult:      Diarrhea x 3 months  Hypotension    History of Present Illness:    This is a 60 yo female PMH triple negative breast cancer on immunotherapy, HCV, Anxiety/depression, seizures, HTN, cervical cancer at 19 years of age, asthma, alcohol abuse, acute pancreatitis who was admitted to the hospital 10/20/22 with chronic diarrhea x 3 months up to 20 times per day with hypotension. She was seen by her oncologist started on prednisone 10 mg since she is on immunotherapy to see if this helped with the diarrhea, when it was not helpful, she was told to come to the ED. Initially, she was hypotensive, responded to fluids. WBC: 11.3-->7.7.  Na: 132. Pot: 2.6. BUN: 63-->44. Crt: 5.47-->1.1.. Alkaline Phosphatase: 197. C diff: negative.     CT scan abdomen/pelvis: Fluid-filled normal caliber loops of small bowel and colon consistent with stated history of diarrhea. No bowel obstruction or acute inflammation.  Moderate hiatal hernia again seen.    Patient stated everytime she eats, develops a burning sensation then will get abdominal cramping and have urgent loose watery stool. Evaluated at [facility] for diarrhea August 2022. Stool studies: Culture, pancreatic elastase, C diff: negative Stool calprotectin: 89 (borderline). Dr. Amador recommended colonoscopy.    Review of Systems:      Review of Systems   Constitutional:  Positive for malaise/fatigue and weight loss.   HENT: Negative.     Eyes: Negative.    Respiratory: Negative.     Cardiovascular: Negative.    Gastrointestinal:  Positive for abdominal pain, diarrhea and nausea.   Genitourinary:  Negative.    Musculoskeletal: Negative.    Skin: Negative.    Neurological:  Positive for weakness.   Psychiatric/Behavioral:  Positive for depression. The patient is nervous/anxious.            Physical Exam:  Vitals/ General Appearance:   Weight/BMI: Body mass index is 32.54 kg/m².    Vitals:    10/21/22 0405 10/21/22 0733 10/21/22 1151 10/21/22 1528   BP: 124/66 114/72 125/69 137/83   Pulse: 95 89 97 96   Resp: 18 18 18 18   Temp: 36.5 °C (97.7 °F) 36.8 °C (98.3 °F) 36.6 °C (97.8 °F) 36.6 °C (97.8 °F)   TempSrc: Oral Oral Oral Oral   SpO2: 95% 94% 96% 93%   Weight:       Height:         Oxygen Therapy:  Pulse Oximetry: 93 %, O2 (LPM): 0, O2 Delivery Device: None - Room Air    Physical Exam  Vitals reviewed.   Constitutional:       Appearance: She is obese. She is ill-appearing.      Comments: Mumbles. Difficult to understand   HENT:      Head: Normocephalic and atraumatic.      Nose: Nose normal.      Mouth/Throat:      Mouth: Mucous membranes are moist.   Eyes:      Extraocular Movements: Extraocular movements intact.      Pupils: Pupils are equal, round, and reactive to light.   Cardiovascular:      Rate and Rhythm: Normal rate and regular rhythm.      Pulses: Normal pulses.      Heart sounds: Normal heart sounds.   Pulmonary:      Effort: Pulmonary effort is normal.      Breath sounds: Normal breath sounds.   Abdominal:      General: Bowel sounds are normal.      Palpations: Abdomen is soft.      Tenderness: There is abdominal tenderness.   Musculoskeletal:         General: Normal range of motion.   Skin:     General: Skin is warm and dry.      Capillary Refill: Capillary refill takes 2 to 3 seconds.      Coloration: Skin is pale.   Neurological:      General: No focal deficit present.      Mental Status: She is alert and oriented to person, place, and time.   Psychiatric:         Mood and Affect: Mood is anxious and depressed. Affect is flat.         Speech: Speech is slurred.         Behavior: Behavior is  "slowed.       Past Medical History:   Past Medical History:   Diagnosis Date    Alcoholism ([facility])     Anemia     pt states she doesn't think it is a current issue    Anxiety     Anxiety and depression 03/23/2021    Arthritis     osteo-legs, knees    ASTHMA     Inhaler as needed.     Breath shortness     On exertion.     Bronchitis 01/18/2022    In the past    Cancer ([facility]) 1981    cervical    Chronic low back pain     Congestive heart failure ([facility])     1/18/22:  pt states she is not sure.     Dental disorder     upper and lower dentures. 1/18/22: Pt. denies    Depression     EtOH dependence ([facility])     Fall     alcohol related    GERD (gastroesophageal reflux disease)     Heart burn     HTN     Hypertension     Indigestion     GERD    Muscle disorder     OSTEOPOROSIS     Other specified symptom associated with female genital organs     \"I have fibroids bad\"\"    Pain 03/23/2021    breast, legs, joints    Pneumonia 01/18/2022    In the past    Psychiatric disorder     PTSD (post-traumatic stress disorder)     Renal disorder     Hx of stones    Renal failure Around 2019    One time related to heeavily drinking per pt.     Seizure ([facility])     several years ago r/t alcohol withdrawl    Seizure ([facility]) 11/03/2020    last seizure was 11/2020    Ulcer     Vitamin D deficiency        Past Surgical History:  Past Surgical History:   Procedure Laterality Date    PB MASTECTOMY, PARTIAL Right 3/26/2021    Procedure: MASTECTOMY, PARTIAL - ESTEBAN LOCALIZED;  Surgeon: Janice Knowles M.D.;  Location: SURGERY SAME DAY [facility];  Service: General    AXILLARY NODE DISSECTION Right 3/26/2021    Procedure: LYMPHADENECTOMY, AXILLARY.;  Surgeon: Janice Knowles M.D.;  Location: SURGERY SAME DAY [facility];  Service: General    GASTROSCOPY-ENDO N/A 10/3/2018    Procedure: GASTROSCOPY-ENDO;  Surgeon: Ben Negro M.D.;  Location: ENDOSCOPY [facility];  Service: Gastroenterology    CYSTOSCOPY STENT PLACEMENT  11/9/2010    Performed by " ANGEL HARRIS at SURGERY [facility]    URETEROSCOPY  11/9/2010    Performed by ANGEL HARRIS at SURGERY [facility]    LASERTRIPSY  11/9/2010    Performed by ANGEL HARRIS at SURGERY [facility]    STENT REMOVAL  11/9/2010    Performed by ANGEL HARRIS at SURGERY [facility]    CYSTO STENT PLACEMNT PRE SURG  10/7/2010    Performed by ANGEL HARRIS at SURGERY [facility]    OTHER Left 2010    Leg    HERNIA REPAIR  1977       Hospital Medications:    Current Facility-Administered Medications:     nystatin (MYCOSTATIN) powder, , Topical, BID, Joseline Murphy D.O., Given at 10/21/22 0937    potassium chloride SA (Kdur) tablet 40 mEq, 40 mEq, Oral, BID, Joseline Murphy D.O., 40 mEq at 10/21/22 0937    lactated ringers infusion, , Intravenous, Continuous, Joseline Murphy D.O., Last Rate: 125 mL/hr at 10/21/22 0937, New Bag at 10/21/22 0937    [CANCELED] Special Contact Isolation, , , CONTINUOUS **AND** [CANCELED] C Diff by PCR rflx Toxin, , , Once **AND** Pharmacy Consult Request, 1 Each, Other, PHARMACY TO DOSE, Evaristo Wheeler D.O.    carisoprodol (SOMA) tablet 350 mg, 350 mg, Oral, Q6HRS PRN, Joseline Murphy D.O., 350 mg at 10/21/22 1215    clonazePAM (KLONOPIN) tablet 2 mg, 2 mg, Oral, TID PRN, Joseline Murphy D.O.    omeprazole (PRILOSEC) capsule 40 mg, 40 mg, Oral, DAILY, Joseline Murphy, D.O., 40 mg at 10/21/22 0612    oxyCODONE immediate release (ROXICODONE) tablet 10 mg, 10 mg, Oral, 6X/DAY, Joseline Murphy, D.O., 10 mg at 10/21/22 1152    predniSONE (DELTASONE) tablet 10 mg, 10 mg, Oral, DAILY, Joseline Murphy, D.O., 10 mg at 10/21/22 0612    albuterol inhaler 2 Puff, 2 Puff, Inhalation, Q4H PRN (RT), Joseline Murphy D.O.    heparin injection 5,000 Units, 5,000 Units, Subcutaneous, Q8HRS, Joseline Murphy D.O., 5,000 Units at 10/21/22 1504    acetaminophen (Tylenol) tablet 650 mg, 650 mg, Oral, Q6HRS PRN, Joseline Murphy D.O.    Notify provider if pain remains  uncontrolled, , , CONTINUOUS **AND** Use the Numeric Rating Scale (NRS), Marte-Baker Faces (WBF), or FLACC on regular floors and Critical-Care Pain Observation Tool (CPOT) on ICUs/Trauma to assess pain, , , CONTINUOUS **AND** Pulse Ox, , , CONTINUOUS **AND** Pharmacy Consult Request ...Pain Management Review 1 Each, 1 Each, Other, PHARMACY TO DOSE **AND** If patient difficult to arouse and/or has respiratory depression (respiratory rate of 10 or less), stop any opiates that are currently infusing and call a Rapid Response., , , CONTINUOUS, Joseline Murphy, D.O.    oxyCODONE immediate-release (ROXICODONE) tablet 5 mg, 5 mg, Oral, Q3HRS PRN **OR** oxyCODONE immediate release (ROXICODONE) tablet 10 mg, 10 mg, Oral, Q3HRS PRN **OR** HYDROmorphone (Dilaudid) injection 0.5 mg, 0.5 mg, Intravenous, Q3HRS PRN, Joseline Murphy, D.O.    labetalol (NORMODYNE/TRANDATE) injection 10 mg, 10 mg, Intravenous, Q4HRS PRN, Joseline Murphy, D.O.    ondansetron (ZOFRAN) syringe/vial injection 4 mg, 4 mg, Intravenous, Q4HRS PRN, Joseline Murphy, D.O., 4 mg at 10/21/22 1150    ondansetron (ZOFRAN ODT) dispertab 4 mg, 4 mg, Oral, Q4HRS PRN, Joseline Murphy, D.O.    promethazine (PHENERGAN) tablet 12.5-25 mg, 12.5-25 mg, Oral, Q4HRS PRN, Joseline Murphy, D.O.    promethazine (PHENERGAN) suppository 12.5-25 mg, 12.5-25 mg, Rectal, Q4HRS PRN, Joseline Murphy, D.O.    prochlorperazine (COMPAZINE) injection 5-10 mg, 5-10 mg, Intravenous, Q4HRS PRN, Joseline Murphy D.O.    Current Outpatient Medications:  Current Facility-Administered Medications   Medication Dose Route Frequency Provider Last Rate Last Admin    nystatin (MYCOSTATIN) powder   Topical BID Joseline Murphy D.O.   Given at 10/21/22 0937    potassium chloride SA (Kdur) tablet 40 mEq  40 mEq Oral BID Joseline Murphy D.O.   40 mEq at 10/21/22 0937    lactated ringers infusion   Intravenous Continuous Joseline Murphy D.O. 125 mL/hr at 10/21/22 0937 New Bag at 10/21/22 0937     Pharmacy Consult Request  1 Each Other PHARMACY TO DOSE Evaristo Wheeler D.O.        carisoprodol (SOMA) tablet 350 mg  350 mg Oral Q6HRS PRN MARGE GarciaO.   350 mg at 10/21/22 1215    clonazePAM (KLONOPIN) tablet 2 mg  2 mg Oral TID PRN MARGE GarciaO.        omeprazole (PRILOSEC) capsule 40 mg  40 mg Oral DAILY MARLINE Garcia.O.   40 mg at 10/21/22 0612    oxyCODONE immediate release (ROXICODONE) tablet 10 mg  10 mg Oral 6X/DAY MARGE GarciaO.   10 mg at 10/21/22 1152    predniSONE (DELTASONE) tablet 10 mg  10 mg Oral DAILY MARLINE Garcia.O.   10 mg at 10/21/22 0612    albuterol inhaler 2 Puff  2 Puff Inhalation Q4H PRN (RT) Joseline Murphy D.O.        heparin injection 5,000 Units  5,000 Units Subcutaneous Q8HRS MARLINE Garcia.O.   5,000 Units at 10/21/22 1504    acetaminophen (Tylenol) tablet 650 mg  650 mg Oral Q6HRS PRN Joseline Murphy D.O.        Pharmacy Consult Request ...Pain Management Review 1 Each  1 Each Other PHARMACY TO DOSE Joseline Murphy D.O.        oxyCODONE immediate-release (ROXICODONE) tablet 5 mg  5 mg Oral Q3HRS PRN MARGE GarciaO.        Or    oxyCODONE immediate release (ROXICODONE) tablet 10 mg  10 mg Oral Q3HRS PRN MARGE GarciaO.        Or    HYDROmorphone (Dilaudid) injection 0.5 mg  0.5 mg Intravenous Q3HRS PRN MARGE GarciaO.        labetalol (NORMODYNE/TRANDATE) injection 10 mg  10 mg Intravenous Q4HRS PRN MARGE GarciaO.        ondansetron (ZOFRAN) syringe/vial injection 4 mg  4 mg Intravenous Q4HRS PRN MARGE GarciaO.   4 mg at 10/21/22 1150    ondansetron (ZOFRAN ODT) dispertab 4 mg  4 mg Oral Q4HRS PRN MARGE GarciaOYasmin        promethazine (PHENERGAN) tablet 12.5-25 mg  12.5-25 mg Oral Q4HRS PRN MARGE GarciaO.        promethazine (PHENERGAN) suppository 12.5-25 mg  12.5-25 mg Rectal Q4HRS PRN MARGE GarciaO.        prochlorperazine (COMPAZINE) injection 5-10 mg  5-10 mg Intravenous Q4HRS PRN  Joesline Murphy D.O.           Medication Allergy:  Allergies   Allergen Reactions    Sulfa Drugs Vomiting    Toradol Vomiting    Aspirin Vomiting     Severely sick as a child.        Family History:  Family History   Problem Relation Age of Onset    Heart Disease Father     Hypertension Father     Diabetes Father     Cancer Paternal Grandmother     Depression Other     Lung Disease Neg Hx     Stroke Neg Hx        Social History:  Social History     Socioeconomic History    Marital status:      Spouse name: Not on file    Number of children: Not on file    Years of education: Not on file    Highest education level: Not on file   Occupational History    Not on file   Tobacco Use    Smoking status: Every Day     Packs/day: 0.25     Years: 20.00     Pack years: 5.00     Types: Cigarettes    Smokeless tobacco: Former     Types: Chew    Tobacco comments:     1/2 pack per day   Vaping Use    Vaping Use: Never used   Substance and Sexual Activity    Alcohol use: Not Currently     Comment: vodka, heavy use-pt stated she is not drinking as of 3/23    Drug use: Not Currently     Comment: In the past smoked pot    Sexual activity: Never     Partners: Male   Other Topics Concern    Not on file   Social History Narrative    ** Merged History Encounter **          Social Determinants of Health     Financial Resource Strain: Not on file   Food Insecurity: Not on file   Transportation Needs: Not on file   Physical Activity: Not on file   Stress: Not on file   Social Connections: Not on file   Intimate Partner Violence: Not on file   Housing Stability: Not on file         MDM (Data Review):     Records reviewed and summarized in current documentation    Lab Data Review:  Recent Results (from the past 24 hour(s))   Basic Metabolic Panel    Collection Time: 10/20/22  6:03 PM   Result Value Ref Range    Sodium 127 (L) 135 - 145 mmol/L    Potassium 2.9 (L) 3.6 - 5.5 mmol/L    Chloride 100 96 - 112 mmol/L    Co2 12 (L) 20 - 33  mmol/L    Glucose 91 65 - 99 mg/dL    Bun 54 (H) 8 - 22 mg/dL    Creatinine 2.25 (H) 0.50 - 1.40 mg/dL    Calcium 8.4 8.4 - 10.2 mg/dL    Anion Gap 15.0 7.0 - 16.0   ESTIMATED GFR    Collection Time: 10/20/22  6:03 PM   Result Value Ref Range    GFR (CKD-EPI) 24 (A) >60 mL/min/1.73 m 2   CBC without Differential    Collection Time: 10/21/22  7:54 AM   Result Value Ref Range    WBC 7.7 4.8 - 10.8 K/uL    RBC 4.37 4.20 - 5.40 M/uL    Hemoglobin 14.2 12.0 - 16.0 g/dL    Hematocrit 40.7 37.0 - 47.0 %    MCV 93.1 81.4 - 97.8 fL    MCH 32.5 27.0 - 33.0 pg    MCHC 34.9 33.6 - 35.0 g/dL    RDW 46.0 35.9 - 50.0 fL    Platelet Count 253 164 - 446 K/uL    MPV 9.7 9.0 - 12.9 fL   Comp Metabolic Panel (CMP)    Collection Time: 10/21/22  7:54 AM   Result Value Ref Range    Sodium 132 (L) 135 - 145 mmol/L    Potassium 2.6 (LL) 3.6 - 5.5 mmol/L    Chloride 106 96 - 112 mmol/L    Co2 13 (L) 20 - 33 mmol/L    Anion Gap 13.0 7.0 - 16.0    Glucose 92 65 - 99 mg/dL    Bun 44 (H) 8 - 22 mg/dL    Creatinine 1.11 0.50 - 1.40 mg/dL    Calcium 9.1 8.4 - 10.2 mg/dL    AST(SGOT) 22 12 - 45 U/L    ALT(SGPT) 11 2 - 50 U/L    Alkaline Phosphatase 197 (H) 30 - 99 U/L    Total Bilirubin 0.5 0.1 - 1.5 mg/dL    Albumin 3.3 3.2 - 4.9 g/dL    Total Protein 6.4 6.0 - 8.2 g/dL    Globulin 3.1 1.9 - 3.5 g/dL    A-G Ratio 1.1 g/dL   CORTISOL    Collection Time: 10/21/22  7:54 AM   Result Value Ref Range    Cortisol 10.9 0.0 - 23.0 ug/dL   Magnesium    Collection Time: 10/21/22  7:54 AM   Result Value Ref Range    Magnesium 1.8 1.5 - 2.5 mg/dL   PHOSPHORUS    Collection Time: 10/21/22  7:54 AM   Result Value Ref Range    Phosphorus 2.7 2.5 - 4.5 mg/dL   ESTIMATED GFR    Collection Time: 10/21/22  7:54 AM   Result Value Ref Range    GFR (CKD-EPI) 57 (A) >60 mL/min/1.73 m 2   Cdiff By PCR Rflx Toxin    Collection Time: 10/21/22  8:40 AM   Result Value Ref Range    C Diff by PCR Negative Negative    [id number] Presumptive Negative Negative        Imaging/Procedures Review:      CT-ABDOMEN-PELVIS W/O   Final Result      1.  Fluid-filled normal caliber loops of small bowel and colon consistent with stated history of diarrhea. No bowel obstruction or acute inflammation.   2.  Moderate hiatal hernia again seen.      DX-CHEST-PORTABLE (1 VIEW)   Final Result         1. No acute cardiopulmonary abnormalities are identified.           MDM (Assessment and Plan):     Patient Active Problem List    Diagnosis Date Noted    Hypotension 10/20/2022    Acute renal failure with tubular necrosis (HCC) 10/20/2022    Debility 10/20/2022    Carcinoma of right breast metastatic to lung (HCC) 04/28/2022    Malignant neoplasm of upper-inner quadrant of right breast in female, estrogen receptor negative (HCC) 07/26/2021    Iron deficiency anemia, unspecified 05/17/2021    Malignant neoplasm of central portion of breast in female, estrogen receptor negative (HCC) 04/15/2021    Hepatitis C 08/28/2020    Elevated liver enzymes 08/24/2020    Hyponatremia 08/24/2020    Pancytopenia (HCC) 07/22/2020    Diarrhea 10/01/2019    High anion gap metabolic acidosis 09/30/2019    Lactic acidosis - resolved 09/30/2019    Tobacco use 09/30/2019    Candidal vulvovaginitis 05/09/2019    Drug overdose, intentional (HCC) 04/29/2019    Alcohol intoxication (HCC) 04/29/2019    Hypoglycemia 04/29/2019    Anemia 04/29/2019    Suicidal ideation 04/29/2019    Overweight (BMI 25.0-29.9) 04/22/2019    Neuropathy 10/03/2018    Drug-seeking behavior 07/22/2018    Chronic pain 07/21/2018    Dehydration, severe 07/18/2018    Hypomagnesemia 01/03/2016    Hypokalemia 01/02/2016    Elevated alkaline phosphatase level 01/02/2016    Homeless 08/03/2015    Suicidal overdose (HCC) 08/03/2015    Essential hypertension 05/06/2015    Leg swelling 05/06/2015    Alcohol withdrawal syndrome without complication (HCC) 12/14/2014    Pneumonitis 12/14/2014    History of suicide attempt 01/15/2013    Personality disorder  ([facility]) 01/15/2013    Fibromyalgia 10/03/2011    Arthritis     GERD (gastroesophageal reflux disease)     ASTHMA     PTSD (post-traumatic stress disorder)     Bipolar 2 disorder ([facility])     Depression      This is a 62 yo female who was admitted to the hospital 10/20/22 with a 3 month history of diarrhea up to 15 times per day, nighttime awakening, with abdominal pain, cramping, nausea, anorexia and weight loss. She is on immunotherapy for triple negative breast cancer. Her oncologist started her on prednisone for the diarrhea since that has helped in the past? Evaluated at [facility] August 2022 for diarrhea. Stool studies: Culture, Pancreatic Elastase, C diff: negative. Stool calprotectin: borderline. During this admission, C diff: negative. She was found to be hypotensive, responding to IV fluids. Labs: hypokalemia, elevated Bun/crt. Elevated alkaline Phosphatase.     ASSESSMENT:  Chronic diarrhea: Stool studies unremarkable. Concern for microscopic colitis vs IBD vs neoplasm   Nausea  GERD  Hypokalemia: likely due to diarrhea  Triple negative breast cancer on immune therapy. On Prednisone unsure if for diarrhea or for oncology purposes  Hyponatremia  Elevated alkaline phosphatase    Risks, benefits, and alternatives of EGD/colonoscopy  procedures were discussed with patient and/or family member(s).  Consenting person(s) were given opportunities to ask questions and discuss other options.  Risks including but not limited to perforation, infection, bleeding, missed lesion(s), possible need for surgery(ies) and/or interventional radiology, possible need for repeat procedure(s) and/or additional testing, hospitalization possibly prolonged, cardiac and/or pulmonary event, aspiration, hypoxia, stroke, medication and/or anesthesia reaction, indefinite diagnosis, discomfort, unsuccessful and/or incomplete procedure, ineffective therapy and/or persistent symptoms, damage to adjacent organs and/or vascular structures, and  other adverse events possibly life-threatening.  Interactive discussion was undertaken with Layman's terms. I answered questions in full and to satisfaction.  Consenting person(s) stated understanding and acceptance of these risks, and wished to proceed.  Informed consent was given in clear state of mind.       Movie Prep  Recommend BMS  Need Potassium Correction before EGD/colonoscopy can be safely done  Recommend GGT and ALP isoenzymes  NPO after midnight  Clear liquid diet no reds      Thank your for the opportunity to assist in the care of your patient.  Please call for any questions or concerns.    FELA Augustine.

## 2022-10-21 NOTE — THERAPY
10/21/22 1401   Initial Contact Note    Initial Contact Note Order Received and Verified, Physical Therapy Evaluation in Progress with Full Report to Follow.   Interdisciplinary Plan of Care Collaboration   IDT Collaboration with  Nursing   Patient Position at End of Therapy In Bed   Collaboration Comments attempted eval pt rude and refused services, said she is too fatiqued. .   Session Information   Date / Session Number  10/21/22 attempted eval refused

## 2022-10-21 NOTE — PROGRESS NOTES
@0310 attempted to draw blood from chest port. Unable to draw back blood from port. Contacted lab to attempt lab draws. Lab notified that pt refused labs. Rescheduled labs for 8am.

## 2022-10-21 NOTE — WOUND TEAM
Received wound consult for pannus area.  Photos reviewed.  Patient has mild MASD to pannus without open skin.  Orders per protocol.  No advanced wound care needs at  this time.

## 2022-10-21 NOTE — PROGRESS NOTES
Telemetry Shift Summary    Rhythm SR  HR Range 98  Ectopy rPVC, PAC  Measurements 0.18/0.10/0.40      Normal Values  Rhythm SR  HR Range:   Measurements: 0.12-0.20/0.06-0.10/0.30-0.52

## 2022-10-21 NOTE — PROGRESS NOTES
Received report from Kandis IRENE. Pt AOx4 and reports 7/10 pain. Pt updated on Poc for the day. Pt has no further questions or needs at this time.

## 2022-10-21 NOTE — CARE PLAN
The patient is Watcher - Medium risk of patient condition declining or worsening    Shift Goals  Clinical Goals: Stool dample, Monitor BP  Patient Goals: Go home  Family Goals: SHAI    Progress made toward(s) clinical / shift goals:    Problem: Knowledge Deficit - Standard  Goal: Patient and family/care givers will demonstrate understanding of plan of care, disease process/condition, diagnostic tests and medications  Outcome: Progressing: pt verbalized understanding of POC     Problem: Skin Integrity  Goal: Skin integrity is maintained or improved  Outcome: Progressing: pt skin remained free from new injuries     Problem: Fall Risk  Goal: Patient will remain free from falls  Outcome: Progressing: pt free from falls throughout shift

## 2022-10-21 NOTE — ASSESSMENT & PLAN NOTE
Secondary to gastrointestinal losses with diarrhea  Most recent level 3.2 and the patient will need at this point oral and IV potassium supplementation.

## 2022-10-22 NOTE — TELEPHONE ENCOUNTER
After hours phone call  Call on 10/22/2022 at 4:10 PM from Ashleigh Ferraroroger Marquis    PCP: LEESA Griffith.  Onc: Dr. Mccain    Pt reports:  - pt is inpatient, clarifying who her physician is inpatient - she is under care of hospitalist team  - EGD/colonoscopy completed today (based on speech she is still clearing anesthesia)  - CT of brain was recommended, she states she refused  - She is asking when she can go home    Plan:  - She is advised that CT or MRI of brain is not uncommon for patients with breast cancer to r/o mets - she is encouraged to participate in her care and follow recommendations but I also acknowledged she has a 'right to refuse' but needs to understand the risks associated with her choices. She verbalized understanding.    - Patient is redirected to discuss care and planning with hospitalist/hospital team, whom are managing her care while inpatient, she should start with her nurse. Patient verbalized understanding.    CACHORRO Wynn    [facility] Hematology/Medical Oncology  Office of [facility], [facility] [place]  Phone: [phone]  Fax: [phone]

## 2022-10-22 NOTE — DIETARY
"Nutrition services: Day 2 of admit.  Ashleigh Marquis is a 61 y.o. female with admitting DX of Severe sepsis (HCC) [A41.9, R65.20]    Consult received for FTT. Per admit screen, no poor PO intake or unplanned wt loss prior to current admit. Unable to assess/interview pt at bedside at this time. Pt currently off floor for procedure.    Assessment:  Height: 160 cm (5' 3\")  Weight: 79.7 kg (175 lb 11.3 oz)  Body mass index is 31.13 kg/m²., BMI classification: Obesity class I  Diet/Intake: NPO, sips w/ meds; <25-50% when on PO diet    Evaluation:   Admit w/ 3-month hx diarrhea up to 20 stools/day, acute renal failure w/ tubular necrosis, hypotension, debility, hypokalemia, hypomagnesemia, hyponatremia. Other hx includes anxiety/depression, CHF, GERD, osteoporosis, PTSD, breast cancer on immunotherapy. Off floor for EGD and colonooscopy today.  Labs: K+ 3.0, BUN 29, phos 2.0, CRP (10/20): 4.01  MAR: 0.9% NaCl w/ KCl, solumedrol, LR, PRN zofran, Kdur, Kphos  Last BM: 10/21 \"large; watery; yellow\"  Appears -193 lb over past year per chart wt hx. Admit wt is below normal for pt: 6.5-9.0% wt loss over ~2 months, though unclear as chart weights fluctuate abruptly.    Malnutrition Risk: Unable to fully assess at this time    Recommendations/Plan:  Follow up w/ pt as able prior to discharge for nutrition-focused physical exam and wt/PO hx interview.   Once diet ordered, encourage intake of meals >50-75%.  Document intake of all PO as % taken in ADL's to provide interdisciplinary communication across all shifts.   Monitor weight.  Nutrition rep will continue to see patient for ongoing meal and snack preferences.     RD following.  "

## 2022-10-22 NOTE — PROGRESS NOTES
Gastroenterology Progress Note     Author: Zachary Pate M.D.     Date & Time Created: 10/22/2022 11:12 AM    Patient ID:  Name:             Ashleigh Marquis   YOB: 1960  Age:                 61 y.o.  female   MRN:               [id number]    Chief Complaint    Chronic diarrhea    Original GI History  This is a 62 yo female PMH triple negative breast cancer on immunotherapy, HCV, Anxiety/depression, seizures, HTN, cervical cancer at 19 years of age, asthma, alcohol abuse, acute pancreatitis who was admitted to the hospital 10/20/22 with chronic diarrhea x 3 months up to 20 times per day with hypotension. She was seen by her oncologist started on prednisone 10 mg since she is on immunotherapy to see if this helped with the diarrhea, when it was not helpful, she was told to come to the ED. Initially, she was hypotensive, responded to fluids. WBC: 11.3-->7.7.  Na: 132. Pot: 2.6. BUN: 63-->44. Crt: 5.47-->1.1.. Alkaline Phosphatase: 197. C diff: negative.      CT scan abdomen/pelvis: Fluid-filled normal caliber loops of small bowel and colon consistent with stated history of diarrhea. No bowel obstruction or acute inflammation.  Moderate hiatal hernia again seen.     Patient stated everytime she eats, develops a burning sensation then will get abdominal cramping and have urgent loose watery stool. [facility] 2022. Stool studies: Culture, pancreatic elastase, C diff: negative Stool calprotectin: 89 (borderline). Dr. Amador recommended colonoscopy.    CBC: Negative.  CRP: 4  INR: 1.1  CMP: Potassium 3.0.  Normal liver tests, except .  10/2022: Cortisol level: 10.9, TSH: 1.2    8/2022: Stool test: Negative: Culture, C. difficile.  10/2022: Stool test: Negative: C. difficile.    APCT with chest 10/5/2022: Disease progression with new NOREEN subpleural nodule.  Faint bilateral patchy groundglass opacities.  No evidence of abdominal or pelvic metastatic  disease.    APCT 10/20/2022: Loops of small bowel and colon filled with fluid, consistent with diarrhea.      Interval History  10/22/2022 before the EGD/colonoscopy  About 7/2022 she developed chronic diarrhea with 20 loose stools daily.  She also had intermittent vague abdominal cramps.  Her course is stable.  She took about half of the MoviePrep.  Producing clear diarrhea.    EGD with biopsy.  Colonoscopy with biopsy.  10/22/2023  1.  Normal EGD except for a hiatal hernia 32-35 cm.  2.  Normal terminal ileum  3.  Normal colonoscopy    Specimens  A.  Descending duodenal biopsies for celiac disease  B.  Gastric biopsies for H. Pylori  C.  Terminal ileal biopsies  D.  Random colon biopsies from the right colon  E.  Random colon biopsies from the left colon    Assessment:  Chronic diarrhea  Taking Solu-Medrol 20 mg every 8 hours for possibility of diarrhea due to immunotherapy.  The diarrhea could be due to immunotherapy.    GERD with heartburn    Metastatic breast cancer being treated with immunotherapy    Heparin treatment, 5000 units subcutaneously every 8 hours      Recommendations  Repeat stool culture.  Also stool Giardia and Cryptosporidium.  Await biopsies from the EGD and colonoscopy.  Check VIP level  Hold subcutaneous heparin until tomorrow morning.    I spent 54 minutes performing this health care today, separate from any other medical services.      Problem List  Active Hospital Problems    Diagnosis     *Hypotension [I95.9]     Acute renal failure with tubular necrosis (HCC) [N17.0]     Debility [R53.81]     Malignant neoplasm of central portion of breast in female, estrogen receptor negative (HCC) [C50.119, Z17.1]     Hyponatremia [E87.1]     Diarrhea [R19.7]     High anion gap metabolic acidosis [E87.29]     Hypomagnesemia [E83.42]     Hypokalemia [E87.6]        Labs:  Recent Labs     10/20/22  0922 10/21/22  0754 10/22/22  0117   WBC 11.3* 7.7 5.9   RBC 5.29 4.37 4.45   HEMOGLOBIN 17.0* 14.2 14.3    HEMATOCRIT 49.9* 40.7 41.9   MCV 94.3 93.1 94.2   MCH 32.1 32.5 32.1   MCHC 34.1 34.9 34.1   RDW 46.4 46.0 46.6   PLATELETCT 331 253 277   MPV 9.5 9.7 10.0      Recent Labs     10/20/22  0922   INR 1.09     Recent Labs     10/21/22  1725 10/22/22  0117 10/22/22  0930   SODIUM 132* 135 138   POTASSIUM 3.1* 3.2* 3.0*   CHLORIDE 103 106 109   CO2 15* 15* 17*   GLUCOSE 110* 112* 93   BUN 34* 32* 29*   CREATININE 0.92 0.83 0.78   CALCIUM 9.2 9.9 9.8     Recent Labs     10/20/22  0922 10/20/22  1803 10/21/22  0754 10/21/22  1725 10/22/22  0117 10/22/22  0930   ALTSGPT 14  --  11  --   --   --    ASTSGOT 20  --  22  --   --   --    ALKPHOSPHAT 266*  --  197*  --   --   --    TBILIRUBIN 0.4  --  0.5  --   --   --    GAMMAGT  --   --   --   --  31  --    LIPASE 32  --   --   --   --   --    GLUCOSE 105*   < > 92 110* 112* 93    < > = values in this interval not displayed.       Blood Culture   Date Value Ref Range Status   07/18/2018 No growth after 5 days of incubation.  Final        GI/Nutrition:  Orders Placed This Encounter   Procedures    Diet NPO Restrict to: Sips with Medications     Standing Status:   Standing     Number of Occurrences:   8     Order Specific Question:   Diet NPO Restrict to:     Answer:   Sips with Medications [3]       Hospital Medications:  potassium phosphate, 30 mmol, Intravenous, Once  nystatin, , Topical, BID  potassium chloride SA, 40 mEq, Oral, BID  methylPREDNISolone, 20 mg, Intravenous, Q8HRS  Pharmacy, 1 Each, Other, PHARMACY TO DOSE  omeprazole, 40 mg, Oral, DAILY  oxyCODONE immediate release, 10 mg, Oral, 6X/DAY  heparin, 5,000 Units, Subcutaneous, Q8HRS  Pharmacy Consult Request, 1 Each, Other, PHARMACY TO DOSE      PRN medications: carisoprodol, clonazePAM, albuterol, acetaminophen, oxyCODONE immediate-release **OR** oxyCODONE immediate-release **OR** HYDROmorphone, labetalol, ondansetron, ondansetron, promethazine, promethazine, prochlorperazine    Current Outpatient  Medications:  No current facility-administered medications on file prior to encounter.     Current Outpatient Medications on File Prior to Encounter   Medication Sig Dispense Refill    diphenoxylate-atropine (LOMOTIL) 2.5-0.025 MG Tab Take 1 Tablet by mouth 4 times a day as needed for Diarrhea.      HYDROcodone/acetaminophen (NORCO)  MG Tab Take 1-2 Tablets by mouth 4 times a day as needed for Severe Pain.      omeprazole (PRILOSEC) 40 MG delayed-release capsule Take 40 mg by mouth every day.      predniSONE (DELTASONE) 10 MG Tab Take 1 Tablet by mouth every day. 30 Tablet 0    carisoprodol (SOMA) 350 MG Tab Take 1 Tablet by mouth every 6 hours as needed for Muscle Spasms for up to 28 days. 78 Tablet 0    ondansetron (ZOFRAN) 4 MG Tab tablet Take 1 Tablet by mouth every four hours as needed for Nausea/Vomiting (for nausea, vomiting). 30 Tablet 6    VENTOLIN  (90 Base) MCG/ACT Aero Soln inhalation aerosol Inhale 2 Puffs 1 time a day as needed for Shortness of Breath. 8.5 g 0    prochlorperazine (COMPAZINE) 10 MG Tab Take 1 Tablet by mouth every 6 hours as needed (for nausea, vomiting). 30 Tablet 6    oxyCODONE immediate release (ROXICODONE) 10 MG immediate release tablet Take 10 mg by mouth 6 Times a Day.      Naloxone HCl (NARCAN NA) Administer  into affected nostril(S).      acetaminophen (TYLENOL) 500 MG Tab Take 1,000 mg by mouth 2 times a day as needed. Indications: Pain      Multiple Vitamin (MULTIVITAMIN ADULT PO) Take 1 Tablet by mouth every day.      clonazepam (KLONOPIN) 2 MG tablet Take 2 mg by mouth 3 times a day as needed.      lisinopril (PRINIVIL) 10 MG Tab Take 10 mg by mouth every day.         Fluids:    Intake/Output Summary (Last 24 hours) at 10/22/2022 1112  Last data filed at 10/22/2022 0759  Gross per 24 hour   Intake 1250 ml   Output 1350 ml   Net -100 ml     Weight: 79.7 kg (175 lb 11.3 oz)    Past Medical History:   Past Medical History:   Diagnosis Date    Alcoholism (HCC)      "Anemia     pt states she doesn't think it is a current issue    Anxiety     Anxiety and depression 03/23/2021    Arthritis     osteo-legs, knees    ASTHMA     Inhaler as needed.     Breath shortness     On exertion.     Bronchitis 01/18/2022    In the past    Cancer ([facility]) 1981    cervical    Chronic low back pain     Congestive heart failure ([facility])     1/18/22:  pt states she is not sure.     Dental disorder     upper and lower dentures. 1/18/22: Pt. denies    Depression     EtOH dependence ([facility])     Fall     alcohol related    GERD (gastroesophageal reflux disease)     Heart burn     HTN     Hypertension     Indigestion     GERD    Muscle disorder     OSTEOPOROSIS     Other specified symptom associated with female genital organs     \"I have fibroids bad\"\"    Pain 03/23/2021    breast, legs, joints    Pneumonia 01/18/2022    In the past    Psychiatric disorder     PTSD (post-traumatic stress disorder)     Renal disorder     Hx of stones    Renal failure Around 2019    One time related to heeavily drinking per pt.     Seizure ([facility])     several years ago r/t alcohol withdrawl    Seizure ([facility]) 11/03/2020    last seizure was 11/2020    Ulcer     Vitamin D deficiency        Past Surgical History:  Past Surgical History:   Procedure Laterality Date    PB MASTECTOMY, PARTIAL Right 3/26/2021    Procedure: MASTECTOMY, PARTIAL - ESTEBAN LOCALIZED;  Surgeon: Janice Knowles M.D.;  Location: SURGERY SAME DAY [facility];  Service: General    AXILLARY NODE DISSECTION Right 3/26/2021    Procedure: LYMPHADENECTOMY, AXILLARY.;  Surgeon: Janice Knowles M.D.;  Location: SURGERY SAME DAY [facility];  Service: General    GASTROSCOPY-ENDO N/A 10/3/2018    Procedure: GASTROSCOPY-ENDO;  Surgeon: Ben Negro M.D.;  Location: ENDOSCOPY [facility];  Service: Gastroenterology    CYSTOSCOPY STENT PLACEMENT  11/9/2010    Performed by ANGEL HARRIS at [facility]    URETEROSCOPY  11/9/2010    Performed by ANGEL HARRIS at " "SURGERY [facility] ORS    LASERTRIPSY  11/9/2010    Performed by ANGEL HARRIS at SURGERY [facility] ORS    STENT REMOVAL  11/9/2010    Performed by ANGEL HARRIS at SURGERY [facility] ORS    CYSTO STENT PLACEMNT PRE SURG  10/7/2010    Performed by ANGEL HARRIS at SURGERY [facility] ORS    OTHER Left 2010    Leg    HERNIA REPAIR  1977       Physical Exam:  Vitals/ General Appearance:   Weight/BMI: Body mass index is 31.13 kg/m².  /71   Pulse 99   Temp 36.8 °C (98.3 °F) (Oral)   Resp 16   Ht 1.6 m (5' 3\")   Wt 79.7 kg (175 lb 11.3 oz)   SpO2 93%   Vitals:    10/21/22 2028 10/21/22 2331 10/22/22 0455 10/22/22 0759   BP: 138/77 135/82 123/75 123/71   Pulse: 93 99 99 99   Resp: 18 18 18 16   Temp: 36.6 °C (97.9 °F) 36.8 °C (98.3 °F) 36.9 °C (98.4 °F) 36.8 °C (98.3 °F)   TempSrc: Oral Oral Oral Oral   SpO2: 93% 91% 94% 93%   Weight:   79.7 kg (175 lb 11.3 oz)    Height:         Oxygen Therapy:  Pulse Oximetry: 93 %, O2 (LPM): 0, O2 Delivery Device: None - Room Air    Physical Exam  Vitals reviewed.   Constitutional:       General: She is not in acute distress.  HENT:      Head: Normocephalic and atraumatic.   Cardiovascular:      Rate and Rhythm: Regular rhythm.      Heart sounds: Normal heart sounds. No murmur heard.    No gallop.   Pulmonary:      Comments: Normal breath sounds anteriorly.  Abdominal:      Palpations: Abdomen is soft. There is no mass.      Tenderness: There is abdominal tenderness (Minimal diffuse abdominal tenderness without guarding.).   Psychiatric:         Judgment: Judgment normal.       Review of Systems:  Review of Systems   Respiratory:  Negative for cough and shortness of breath.    Cardiovascular:  Negative for chest pain.   Gastrointestinal:  Positive for abdominal pain and diarrhea. Negative for blood in stool, melena and vomiting.           Zachary Pate M.D.            "

## 2022-10-22 NOTE — OR NURSING
1334- Pt to pacu via gurney with side rails up.  VSS. Pt  awakens to voice. No complaints of pain/nausea.  1350- Pt c/o back pain, rates at 7/10.  See mar for med given. VSS.  1400- called daughter to update, left message per pt's request.  1405- Pt states pain improving, rates at 6/10.  1412- Pt meets transfer criteria, report to Kandis IRENE.

## 2022-10-22 NOTE — ANESTHESIA TIME REPORT
Anesthesia Start and Stop Event Times     Date Time Event    10/22/2022 1236 Ready for Procedure     1240 Anesthesia Start     1336 Anesthesia Stop        Responsible Staff  10/22/22    Name Role Begin End    Israel Us M.D. Anesth 1240 1336        Overtime Reason:  no overtime (within assigned shift)    Comments:

## 2022-10-22 NOTE — WOUND TEAM
Wound team note:   Pt seen for placement of BMS. MD order verified. Pt assessed, noted to be incontinent of loose brown stool. Sacrum/buttocks pink and intact. Sphincter tone determined to be adequate. BMS placed without difficulty, 40 mL of tap water placed in retention balloon, irrigated with 60 mL warm tap water. Confirmed irrigant/stool output in tube Nursing to irrigate q shift with 60 mL-120 mL warm tap water or until patent.

## 2022-10-22 NOTE — PROGRESS NOTES
Patient was seen and examined.  She has a 3-month history of chronic diarrhea, passing about 20 loose stools daily.  No signs of GI bleeding.  She also has intermittent vague abdominal cramps.    Treating with heparin 5000 units subcutaneously, last dose 0500 today.  Potassium 3.0 today, and being replaced.    I discussed the EGD and colonoscopy with possible biopsy and therapeutics procedure with the patient, including the indications, risks, benefits, alternatives and the method of performing the procedure.  Questions about the procedure were solicited and answered to her satisfaction.  She appeared to understand and agreed to proceed with it.

## 2022-10-22 NOTE — ANESTHESIA PREPROCEDURE EVALUATION
Case: [id number] Date/Time: 10/22/22 1045    Procedures:       ESOPHAGOGASTRODUODENOSCOPY AND COLONOSCOPY      COLONOSCOPY    Location:  ENDOSCOPIC ULTRASOUND ROOM / SURGERY [facility]    Surgeons: Zachary Pate M.D.      etoh hx  Breast ca  Hx sz  Relevant Problems   PULMONARY   (positive) Asthma      NEURO   (positive) History of suicide attempt      CARDIAC   (positive) Essential hypertension      GI   (positive) GERD (gastroesophageal reflux disease)         (positive) Acute renal failure with tubular necrosis (HCC)   (positive) Hepatitis C      Other   (positive) Arthritis       Physical Exam    Airway   Mallampati: II  TM distance: >3 FB  Neck ROM: full       Cardiovascular - normal exam  Rhythm: regular  Rate: normal  (-) murmur     Dental - normal exam           Pulmonary - normal exam  Breath sounds clear to auscultation     Abdominal    Neurological - normal exam                 Anesthesia Plan    ASA 3   ASA physical status 3 criteria: alcohol and/or substance dependence or abuse    Plan - MAC               Induction: intravenous    Postoperative Plan: Postoperative administration of opioids is intended.    Pertinent diagnostic labs and testing reviewed    Informed Consent:    Anesthetic plan and risks discussed with patient.    Use of blood products discussed with: patient whom consented to blood products.

## 2022-10-22 NOTE — PROGRESS NOTES
Hospital Medicine Daily Progress Note    Date of Service  10/22/2022    Chief Complaint  Ashleigh Marquis is a 61 y.o. female admitted 10/20/2022 with diarrhea    Hospital Course  Ashleigh is a 61-year-old female with a history of C. difficile colitis with ongoing immunotherapy for breast cancer who has developed severe diarrhea that has incapacitate her.  The patient has become hypotensive and has entered into acute renal failure as well as electrolyte derangements.  The patient at this point is undergoing fluid resuscitation and electrolyte replacement.  The patient will need a colonoscopy.  Gastroenterology today.  The patient will need continued management to control her diarrhea and figure out what exactly is causing it.  So far C. difficile repeat testing has been negative and stool studies are pending.    Interval Problem Update  Colonoscopy today  N.p.o. for the procedure  Fluid resuscitation  Monitor blood pressure  Electrolyte replacement  Potassium low and at this point patient will need IV potassium replacement  Magnesium has been corrected  She has low phosphorus and patient will need phosphorus correction  Nutritional support  Therapy evaluations  Discharge planning      I have discussed this patient's plan of care and discharge plan at IDT rounds today with Case Management, Nursing, Nursing leadership, and other members of the IDT team.    Consultants/Specialty  GI    Code Status  Full Code    Disposition  Patient is not medically cleared for discharge.   Anticipate discharge to to home with close outpatient follow-up.  I have placed the appropriate orders for post-discharge needs.    Review of Systems  Review of Systems   Constitutional:  Positive for malaise/fatigue and weight loss. Negative for chills, diaphoresis and fever.   HENT: Negative.     Eyes: Negative.  Negative for double vision.   Respiratory: Negative.  Negative for cough, hemoptysis and wheezing.    Cardiovascular: Negative.   Negative for chest pain, palpitations and leg swelling.   Gastrointestinal:  Positive for abdominal pain and diarrhea. Negative for blood in stool, constipation, heartburn, nausea and vomiting.   Genitourinary: Negative.  Negative for frequency, hematuria and urgency.   Musculoskeletal:  Positive for back pain and myalgias. Negative for joint pain.   Skin: Negative.  Negative for itching and rash.   Neurological:  Positive for headaches. Negative for dizziness, focal weakness, seizures and loss of consciousness.   Endo/Heme/Allergies: Negative.  Does not bruise/bleed easily.   Psychiatric/Behavioral:  Positive for depression. Negative for suicidal ideas. The patient has insomnia. The patient is not nervous/anxious.    All other systems reviewed and are negative.     Physical Exam  Temp:  [36.6 °C (97.8 °F)-36.9 °C (98.4 °F)] 36.8 °C (98.3 °F)  Pulse:  [] 99  Resp:  [16-18] 16  BP: (123-153)/(69-83) 123/71  SpO2:  [91 %-96 %] 93 %    Physical Exam  Vitals and nursing note reviewed. Exam conducted with a chaperone present.   Constitutional:       Appearance: She is well-developed. She is obese. She is ill-appearing.   HENT:      Head: Normocephalic and atraumatic.      Right Ear: External ear normal.      Left Ear: External ear normal.      Nose: Nose normal.      Mouth/Throat:      Mouth: Mucous membranes are moist.      Pharynx: Oropharynx is clear.   Eyes:      Extraocular Movements: Extraocular movements intact.      Conjunctiva/sclera: Conjunctivae normal.      Pupils: Pupils are equal, round, and reactive to light.   Neck:      Thyroid: No thyromegaly.      Vascular: No JVD.   Cardiovascular:      Rate and Rhythm: Normal rate and regular rhythm.   Pulmonary:      Effort: Pulmonary effort is normal.      Breath sounds: Normal breath sounds.   Chest:      Chest wall: No tenderness.   Abdominal:      General: Abdomen is flat. Bowel sounds are normal. There is distension.      Palpations: Abdomen is soft.  There is no mass.      Tenderness: There is abdominal tenderness. There is no guarding or rebound.   Musculoskeletal:         General: Normal range of motion.      Cervical back: Normal range of motion and neck supple.      Right lower leg: No edema.      Left lower leg: No edema.   Lymphadenopathy:      Cervical: No cervical adenopathy.   Skin:     General: Skin is warm and dry.      Capillary Refill: Capillary refill takes less than 2 seconds.      Coloration: Skin is not jaundiced.      Findings: No rash.   Neurological:      General: No focal deficit present.      Mental Status: She is alert and oriented to person, place, and time. Mental status is at baseline.      Cranial Nerves: No cranial nerve deficit.      Deep Tendon Reflexes: Reflexes are normal and symmetric.   Psychiatric:         Mood and Affect: Mood normal.         Behavior: Behavior normal.         Thought Content: Thought content normal.         Judgment: Judgment normal.       Fluids    Intake/Output Summary (Last 24 hours) at 10/22/2022 0932  Last data filed at 10/22/2022 0759  Gross per 24 hour   Intake 1250 ml   Output 1350 ml   Net -100 ml       Laboratory  Recent Labs     10/20/22  0922 10/21/22  0754 10/22/22  0117   WBC 11.3* 7.7 5.9   RBC 5.29 4.37 4.45   HEMOGLOBIN 17.0* 14.2 14.3   HEMATOCRIT 49.9* 40.7 41.9   MCV 94.3 93.1 94.2   MCH 32.1 32.5 32.1   MCHC 34.1 34.9 34.1   RDW 46.4 46.0 46.6   PLATELETCT 331 253 277   MPV 9.5 9.7 10.0     Recent Labs     10/21/22  0754 10/21/22  1725 10/22/22  0117   SODIUM 132* 132* 135   POTASSIUM 2.6* 3.1* 3.2*   CHLORIDE 106 103 106   CO2 13* 15* 15*   GLUCOSE 92 110* 112*   BUN 44* 34* 32*   CREATININE 1.11 0.92 0.83   CALCIUM 9.1 9.2 9.9     Recent Labs     10/20/22  0922   INR 1.09               Imaging  CT-ABDOMEN-PELVIS W/O   Final Result      1.  Fluid-filled normal caliber loops of small bowel and colon consistent with stated history of diarrhea. No bowel obstruction or acute inflammation.    2.  Moderate hiatal hernia again seen.      DX-CHEST-PORTABLE (1 VIEW)   Final Result         1. No acute cardiopulmonary abnormalities are identified.      MR-BRAIN-WITH & W/O    (Results Pending)        Assessment/Plan  * Hypotension- (present on admission)  Assessment & Plan  Secondary to dehydration and hypovolemia  Continue with fluid resuscitation  Monitor blood pressure  Most recent blood pressure 123/71    Acute renal failure with tubular necrosis (HCC)  Assessment & Plan  Secondary to hypovolemia  Give fluid resuscitation and monitor renal functions  Avoid nephrotoxic medications    Diarrhea  Assessment & Plan  Ongoing diarrhea which has been chronic may be secondary to immunosuppressive treatment  History of C. difficile colitis and also on oral vancomycin empirically  Gastroenterology consulted  Colonoscopy today  Await recommendations from gastroenterology after colonoscopy for possibly treating diarrhea with medication management  Rectal tube in place for now  Stool studies pending        Malignant neoplasm of central portion of breast in female, estrogen receptor negative (HCC)- (present on admission)  Assessment & Plan  Continue oncological follow-up as an outpatient  Stage IIa    Hyponatremia  Assessment & Plan  Secondary to hypovolemia and dehydration  Give fluid resuscitation monitor sodium levels, most recently improved to 135    High anion gap metabolic acidosis- (present on admission)  Assessment & Plan  Secondary to combination of dehydration and lack of oral intake with starvation.  Patient once he started to eat again will need to be monitored for refeeding.    Hypomagnesemia  Assessment & Plan  Secondary to dehydration with diarrhea  After magnesium supplementation level is 2.1    Hypokalemia  Assessment & Plan  Secondary to gastrointestinal losses with diarrhea  Most recent level 3.2 and the patient will need at this point oral and IV potassium supplementation.    Debility- (present on  admission)  Assessment & Plan  Continue with physical therapy and Occupational Therapy evaluation  Possible need for skilled placement       VTE prophylaxis: SCDs/TEDs    I have performed a physical exam and reviewed and updated ROS and Plan today (10/22/2022). In review of yesterday's note (10/21/2022), there are no changes except as documented above.

## 2022-10-22 NOTE — OR SURGEON
EGD and colonoscopy procedure note    PreOp Diagnosis: Chronic diarrhea      PostOp Diagnosis: Chronic diarrhea      Procedure(s):  ESOPHAGOGASTRODUODENOSCOPY AND COLONOSCOPY  COLONOSCOPY    Surgeon(s):  Zachary Pate M.D.    Anesthesiologist/Type of Anesthesia:  Anesthesiologist: Israel Us M.D./* No anesthesia type entered *    Surgical Staff:  Circulator: Vivian Vang R.N.  Endoscopy Technician: Tanya Renae    Specimens removed if any:  ID Type Source Tests Collected by Time Destination   A : rule out celiac Tissue Duodenum PATHOLOGY SPECIMEN Zachary Pate M.D. 10/22/2022 12:53 PM    B : rule out h. pylori Tissue Gastric PATHOLOGY SPECIMEN Zachary Pate M.D. 10/22/2022 12:56 PM    C :  Tissue Terminal Ileum PATHOLOGY SPECIMEN Zachary Pate M.D. 10/22/2022  1:15 PM    D : right colon - random Tissue Colon PATHOLOGY SPECIMEN Zachary Pate M.D. 10/22/2022  1:18 PM    E : random left colon Tissue Colon PATHOLOGY SPECIMEN Zachary Pate M.D. 10/22/2022  1:22 PM        Estimated Blood Loss: Minimal    Findings:   1.  Normal EGD except for a hiatal hernia 32-35 cm.  2.  Normal terminal ileum  3.  Normal colonoscopy    Specimens  A.  Descending duodenal biopsies for celiac disease  B.  Gastric biopsies for H. Pylori  C.  Terminal ileal biopsies  D.  Random colon biopsies from the right colon  E.  Random colon biopsies from the left colon    Complications: None      Procedure note  After signed informed consent, the patient was placed in the left lateral decubitus position and deep sedation was administered by the anesthesiologist.  The Olympus flexible videoendoscope was introduced into the oropharynx and advanced through the esophagus and stomach and into the descending duodenum without difficulty.  On withdrawal of the endoscope, the descending duodenum and bulb of the duodenum were normal.  Descending duodenal biopsies were taken to evaluate for celiac disease.  The pylorus was  normal.  The gastric mucosa was normal in its entirety.  Gastric biopsies were taken to rule out H. pylori.  The gastroesophageal junction on forward and retroflexed view demonstrated a hiatal hernia from 32-35 cm.  The esophageal mucosa was normal.  The patient tolerated the procedure well and there were no immediate complications.    The patient was repositioned and the rectal tube was removed.  Digital rectal examination was unremarkable.  The Olympus pediatric flexible video colonoscope was introduced into the rectum and advanced through the colon to the cecum with moderate difficulty.  Retained stool was removed with irrigation and suction.  The ileocecal valve was identified and intubated and the terminal ileum mucosa appeared normal.  Biopsies were taken of it.  The ileocecal valve and the appendiceal orifice were visualized.  On withdrawal of the colonoscope, the colonic mucosa was normal.  Random colon biopsies were taken from the right colon, and separately from the left colon.  Moderate internal hemorrhoids were present on forward and retroflexed view of the anus.  The patient tolerated the procedure well and there were no immediate complications.  She was returned to the recovery area in satisfactory condition.          10/22/2022 1:29 PM Zachary Pate M.D.

## 2022-10-22 NOTE — PROGRESS NOTES
Telemetry Shift Summary     Rhythm SR/ ST  HR Range   Ectopy r PAC/ PVC  Measurements  0.16/0.08/0.34  Per strip printed 0400     Normal Values  Rhythm SR  HR Range    Measurements 0.12-0.20 / 0.06-0.10  / 0.30-0.52

## 2022-10-22 NOTE — PROGRESS NOTES
Patient back on unit resting comfortably in bed. Post-op vital started. Patient educated on clear liquid diet. No complaints at this time.

## 2022-10-22 NOTE — CARE PLAN
The patient is Stable - Low risk of patient condition declining or worsening    Shift Goals  Clinical Goals: Manage pain; maintain comfort  Patient Goals: go home  Family Goals: SHAI    Progress made toward(s) clinical / shift goals:    Problem: Hemodynamics  Goal: Patient's hemodynamics, fluid balance and neurologic status will be stable or improve  Outcome: Progressing     Problem: Fluid Volume  Goal: Fluid volume balance will be maintained  Outcome: Progressing     Problem: Urinary - Renal Perfusion  Goal: Ability to achieve and maintain adequate renal perfusion and functioning will improve  Outcome: Progressing     Problem: Respiratory  Goal: Patient will achieve/maintain optimum respiratory ventilation and gas exchange  Outcome: Progressing     Problem: Knowledge Deficit - Standard  Goal: Patient and family/care givers will demonstrate understanding of plan of care, disease process/condition, diagnostic tests and medications  Outcome: Progressing     Problem: Pain - Standard  Goal: Alleviation of pain or a reduction in pain to the patient’s comfort goal  Outcome: Progressing       Patient is not progressing towards the following goals:

## 2022-10-22 NOTE — ANESTHESIA POSTPROCEDURE EVALUATION
Patient: Ashleigh Marquis    Procedure Summary     Date: 10/22/22 Room / Location:  ENDOSCOPIC ULTRASOUND ROOM / SURGERY [facility]    Anesthesia Start: 1240 Anesthesia Stop: 1336    Procedures:       ESOPHAGOGASTRODUODENOSCOPY with biopsy (Mouth)      COLONOSCOPY with biopsy (Anus) Diagnosis: (Chronic diarrhea)    Surgeons: Zachary Pate M.D. Responsible Provider: Israel Us M.D.    Anesthesia Type: MAC ASA Status: 3          Final Anesthesia Type: MAC  Last vitals  BP   Blood Pressure: 127/76    Temp   36.3 °C (97.3 °F)    Pulse   85   Resp   12    SpO2   95 %      Anesthesia Post Evaluation    Patient location during evaluation: PACU  Patient participation: complete - patient participated  Level of consciousness: awake and alert    Airway patency: patent  Anesthetic complications: no  Cardiovascular status: hemodynamically stable  Respiratory status: acceptable  Hydration status: euvolemic    PONV: none          No notable events documented.     Nurse Pain Score: 6 (NPRS)

## 2022-10-22 NOTE — PROGRESS NOTES
Telemetry Shift Summary    Rhythm SR/ST  HR Range  (up to 162 non-sustaining)  Ectopy rPVC/Coup  Measurements 0.18/0.08/0.32      Normal Values  Rhythm SR  HR Range:   Measurements: 0.12-0.20/0.06-0.10/0.30-0.52

## 2022-10-23 NOTE — PROGRESS NOTES
Telemetry Shift Summary    Rhythm SR  HR Range 91-94  Ectopy rPVC  Measurements 0.18/0.08/0.36      Normal Values  Rhythm SR  HR Range:   Measurements: 0.12-0.20/0.06-0.10/0.30-0.52

## 2022-10-23 NOTE — FACE TO FACE
"Face to Face Note  -  Durable Medical Equipment    Trevor De Guzman M.D. - NPI: [id number]  I certify that this patient is under my care and that they had a durable medical equipment(DME)face to face encounter by myself that meets the physician DME face-to-face encounter requirements with this patient on:    Date of encounter:   Patient:                    MRN:                       YOB: 2020  Ashleigh Marquis  [id number]  1960     The encounter with the patient was in whole, or in part, for the following medical condition, which is the primary reason for durable medical equipment:  COPD    I certify that, based on my findings, the following durable medical equipment is medically necessary:    Oxygen   HOME O2 Saturation Measurements:(Values must be present for Home Oxygen orders)  Room air sat at rest: 95  Room air sat with amb: 87  With liters of O2: 2, O2 sat at rest with O2: 100  With Liters of O2: 3, O2 sat with amb with O2 : 96     If patient feels more short of breath, they can go up to 6 liters per minute and contact healthcare provider.    Supporting Symptoms: The patient requires supplemental oxygen, as the following interventions have been tried with limited or no improvement: \"Bronchodilators and/or steroid inhalers.    My Clinical findings support the need for the above equipment due to:  Abnormal Gait, Hypoxia  "

## 2022-10-23 NOTE — CARE PLAN
The patient is Stable - Low risk of patient condition declining or worsening    Shift Goals  Clinical Goals: pain mangement and IVF  Patient Goals: rest and sleep  Family Goals: SHAI    Progress made toward(s) clinical / shift goals:  Patient pain is controlled and managed per MAR, IVF, pt remain free of falls at this time, pt resting, bed alarm on, and call light within reach.     Problem: Knowledge Deficit - Standard  Goal: Patient and family/care givers will demonstrate understanding of plan of care, disease process/condition, diagnostic tests and medications  Outcome: Progressing     Problem: Fall Risk  Goal: Patient will remain free from falls  Outcome: Progressing     Problem: Pain - Standard  Goal: Alleviation of pain or a reduction in pain to the patient’s comfort goal  Outcome: Progressing       Patient is not progressing towards the following goals:

## 2022-10-23 NOTE — PROGRESS NOTES
Gastroenterology Progress Note     Author: Zachary Pate M.D.     Date & Time Created: 10/23/2022 9:33 AM    Patient ID:  Name:             Ashleigh Marquis   YOB: 1960  Age:                 61 y.o.  female   MRN:               [id number]    Chief Complaint    Chronic diarrhea    Original GI History  This is a 60 yo female PMH triple negative breast cancer on immunotherapy, HCV, Anxiety/depression, seizures, HTN, cervical cancer at 19 years of age, asthma, alcohol abuse, acute pancreatitis who was admitted to the hospital 10/20/22 with chronic diarrhea x 3 months up to 20 times per day with hypotension. She was seen by her oncologist started on prednisone 10 mg since she is on immunotherapy to see if this helped with the diarrhea, when it was not helpful, she was told to come to the ED. Initially, she was hypotensive, responded to fluids. WBC: 11.3-->7.7.  Na: 132. Pot: 2.6. BUN: 63-->44. Crt: 5.47-->1.1.. Alkaline Phosphatase: 197. C diff: negative.      CT scan abdomen/pelvis: Fluid-filled normal caliber loops of small bowel and colon consistent with stated history of diarrhea. No bowel obstruction or acute inflammation.  Moderate hiatal hernia again seen.     Patient stated everytime she eats, develops a burning sensation then will get abdominal cramping and have urgent loose watery stool. [facility]uated at [facility] for diarrhea August 2022. Stool studies: Culture, pancreatic elastase, C diff: negative Stool calprotectin: 89 (borderline). Dr. Amador recommended colonoscopy.    CBC: Negative.  CRP: 4  INR: 1.1  CMP: Potassium 3.0.  Normal liver tests, except .  10/2022: Cortisol level: 10.9, TSH: 1.2    8/2022: Stool test: Negative: Culture, C. difficile.  10/2022: Stool test: Negative: C. difficile.    APCT with chest 10/5/2022: Disease progression with new NOREEN subpleural nodule.  Faint bilateral patchy groundglass opacities.  No evidence of abdominal or pelvic metastatic  disease.    APCT 10/20/2022: Loops of small bowel and colon filled with fluid, consistent with diarrhea.      Interval History  10/22/2022 before the EGD/colonoscopy  About 7/2022 she developed chronic diarrhea with 20 loose stools daily.  She also had intermittent vague abdominal cramps.  Her course is stable.  She took about half of the MoviePrep.  Producing clear diarrhea.    EGD with biopsy.  Colonoscopy with biopsy.  10/22/2022  1.  Normal EGD except for a hiatal hernia 32-35 cm.  2.  Normal terminal ileum  3.  Normal colonoscopy    Specimens  A.  Descending duodenal biopsies for celiac disease  B.  Gastric biopsies for H. Pylori  C.  Terminal ileal biopsies  D.  Random colon biopsies from the right colon  E.  Random colon biopsies from the left colon      10/23/2022  She feels well and denied symptoms.  Continues to have diarrhea, but may be improving.  Potassium normal at 3.9 today.      Assessment:  Chronic diarrhea  About 7/2022 she developed chronic diarrhea with 20 loose stools daily.  She also had intermittent vague abdominal cramps.  10/2022: CRP: 4.  TSH: 1.2, cortisol level 10.8  8/2022: Stool tests: Negative: Culture, C. difficile.    8/2022: Stool lactoferrin: Positive.  Stool calprotectin: High at 89.    8/2022: Stool pancreatic elastase: Normal at 607.  10/2022: Stool test: Negative: C. difficile.  APCT 10/20/2022: Loops of small bowel and colon filled with fluid, consistent with diarrhea.  EGD 10/22: Hiatal hernia 32-35 cm.  Pending: Gastric and duodenal BX.  COL 10/2022: Normal TI and colon.  Pending: TI and random colon BX.  The diarrhea could be due to immunotherapy.  Another cause has not been found.  Await the pending tests below.  Treat the diarrhea symptomatically with Imodium and cholestyramine    Hypokalemia  Currently resolved.  As long as she has diarrhea, significant potassium losses are expected, and potassium replacement will be necessary.  As the diarrhea improves, the amount of  potassium replacement should decrease.  Fairly frequent potassium levels may be needed.  Please have her follow-up with her primary physician for this issue.    GERD with heartburn  Treat symptomatically.      Metastatic breast cancer being treated with immunotherapy    Heparin treatment, 5000 units subcutaneously every 8 hours      Recommendations  OK for discharge from my perspective.  The diarrhea can be treated symptomatically with Imodium.  Cholestyramine could be added if needed, up to 4 grams 4 times daily.  Replace potassium as needed, as above.  OK to stop corticosteroid treatment.    Pending tests:  Stool tests (culture, Giardia, Cryptosporidium).  VIP level.  EGD/COL Bxs.  Office visit with [facility] in 2-4 weeks.    GI will stand by for now.    Please call [facility] if additional GI questions arise ([phone]).  Thank you again for this consult.    I spent 69 minutes performing this health care today, separate from any other medical services.      Problem List  Active Hospital Problems    Diagnosis     *Hypotension [I95.9]     Acute renal failure with tubular necrosis (HCC) [N17.0]     Debility [R53.81]     Malignant neoplasm of central portion of breast in female, estrogen receptor negative (HCC) [C50.119, Z17.1]     Hyponatremia [E87.1]     Diarrhea [R19.7]     High anion gap metabolic acidosis [E87.29]     Hypomagnesemia [E83.42]     Hypokalemia [E87.6]        Labs:  Recent Labs     10/21/22  0754 10/22/22  0117   WBC 7.7 5.9   RBC 4.37 4.45   HEMOGLOBIN 14.2 14.3   HEMATOCRIT 40.7 41.9   MCV 93.1 94.2   MCH 32.5 32.1   MCHC 34.9 34.1   RDW 46.0 46.6   PLATELETCT 253 277   MPV 9.7 10.0              Recent Labs     10/22/22  0930 10/22/22  1725 10/23/22  0140   SODIUM 138 138 138   POTASSIUM 3.0* 3.7 3.9   CHLORIDE 109 109 112   CO2 17* 18* 19*   GLUCOSE 93 142* 108*   BUN 29* 25* 22   CREATININE 0.78 0.76 0.65   CALCIUM 9.8 9.5 9.1       Recent Labs     10/21/22  0754 10/21/22  1725  10/22/22  0117 10/22/22  0930 10/22/22  1725 10/23/22  0140   ALTSGPT 11  --   --   --   --   --    ASTSGOT 22  --   --   --   --   --    ALKPHOSPHAT 197*  --   --   --   --   --    TBILIRUBIN 0.5  --   --   --   --   --    GAMMAGT  --   --  31  --   --   --    GLUCOSE 92   < > 112* 93 142* 108*    < > = values in this interval not displayed.         Blood Culture   Date Value Ref Range Status   07/18/2018 No growth after 5 days of incubation.  Final        GI/Nutrition:  Orders Placed This Encounter   Procedures    Diet Order Diet: Regular     Standing Status:   Standing     Number of Occurrences:   1     Order Specific Question:   Diet:     Answer:   Regular [1]       Hospital Medications:  nystatin, , Topical, BID  potassium chloride SA, 40 mEq, Oral, BID  methylPREDNISolone, 20 mg, Intravenous, Q8HRS  Pharmacy, 1 Each, Other, PHARMACY TO DOSE  omeprazole, 40 mg, Oral, DAILY  oxyCODONE immediate release, 10 mg, Oral, 6X/DAY  heparin, 5,000 Units, Subcutaneous, Q8HRS  Pharmacy Consult Request, 1 Each, Other, PHARMACY TO DOSE    PRN medications: loperamide, carisoprodol, clonazePAM, albuterol, acetaminophen, oxyCODONE immediate-release **OR** oxyCODONE immediate-release **OR** HYDROmorphone, labetalol, ondansetron, ondansetron, promethazine, promethazine, prochlorperazine    Current Outpatient Medications:  No current facility-administered medications on file prior to encounter.     Current Outpatient Medications on File Prior to Encounter   Medication Sig Dispense Refill    diphenoxylate-atropine (LOMOTIL) 2.5-0.025 MG Tab Take 1 Tablet by mouth 4 times a day as needed for Diarrhea.      HYDROcodone/acetaminophen (NORCO)  MG Tab Take 1-2 Tablets by mouth 4 times a day as needed for Severe Pain.      omeprazole (PRILOSEC) 40 MG delayed-release capsule Take 40 mg by mouth every day.      predniSONE (DELTASONE) 10 MG Tab Take 1 Tablet by mouth every day. 30 Tablet 0    carisoprodol (SOMA) 350 MG Tab Take 1  Tablet by mouth every 6 hours as needed for Muscle Spasms for up to 28 days. 78 Tablet 0    ondansetron (ZOFRAN) 4 MG Tab tablet Take 1 Tablet by mouth every four hours as needed for Nausea/Vomiting (for nausea, vomiting). 30 Tablet 6    VENTOLIN  (90 Base) MCG/ACT Aero Soln inhalation aerosol Inhale 2 Puffs 1 time a day as needed for Shortness of Breath. 8.5 g 0    prochlorperazine (COMPAZINE) 10 MG Tab Take 1 Tablet by mouth every 6 hours as needed (for nausea, vomiting). 30 Tablet 6    oxyCODONE immediate release (ROXICODONE) 10 MG immediate release tablet Take 10 mg by mouth 6 Times a Day.      Naloxone HCl (NARCAN NA) Administer  into affected nostril(S).      acetaminophen (TYLENOL) 500 MG Tab Take 1,000 mg by mouth 2 times a day as needed. Indications: Pain      Multiple Vitamin (MULTIVITAMIN ADULT PO) Take 1 Tablet by mouth every day.      clonazepam (KLONOPIN) 2 MG tablet Take 2 mg by mouth 3 times a day as needed.      lisinopril (PRINIVIL) 10 MG Tab Take 10 mg by mouth every day.         Fluids:    Intake/Output Summary (Last 24 hours) at 10/22/2022 1112  Last data filed at 10/22/2022 0759  Gross per 24 hour   Intake 1250 ml   Output 1350 ml   Net -100 ml     Weight: 81.5 kg (179 lb 10.8 oz)    Past Medical History:   Past Medical History:   Diagnosis Date    Alcoholism (Formerly [facility])     Anemia     pt states she doesn't think it is a current issue    Anxiety     Anxiety and depression 03/23/2021    Arthritis     osteo-legs, knees    ASTHMA     Inhaler as needed.     Breath shortness     On exertion.     Bronchitis 01/18/2022    In the past    Cancer (Formerly [facility]) 1981    cervical    Chronic low back pain     Congestive heart failure (Formerly [facility])     1/18/22:  pt states she is not sure.     Dental disorder     upper and lower dentures. 1/18/22: Pt. denies    Depression     EtOH dependence (Formerly [facility])     Fall     alcohol related    GERD (gastroesophageal reflux disease)     Heart burn     HTN     Hypertension     Indigestion   "   GERD    Muscle disorder     OSTEOPOROSIS     Other specified symptom associated with female genital organs     \"I have fibroids bad\"\"    Pain 03/23/2021    breast, legs, joints    Pneumonia 01/18/2022    In the past    Psychiatric disorder     PTSD (post-traumatic stress disorder)     Renal disorder     Hx of stones    Renal failure Around 2019    One time related to heeavily drinking per pt.     Seizure (HCC)     several years ago r/t alcohol withdrawl    Seizure (HCC) 11/03/2020    last seizure was 11/2020    Ulcer     Vitamin D deficiency        Past Surgical History:  Past Surgical History:   Procedure Laterality Date    PB MASTECTOMY, PARTIAL Right 3/26/2021    Procedure: MASTECTOMY, PARTIAL - ESTEBAN LOCALIZED;  Surgeon: Janice Knowles M.D.;  Location: SURGERY SAME DAY [facility];  Service: General    AXILLARY NODE DISSECTION Right 3/26/2021    Procedure: LYMPHADENECTOMY, AXILLARY.;  Surgeon: Janice Knowles M.D.;  Location: SURGERY SAME DAY [facility];  Service: General    GASTROSCOPY-ENDO N/A 10/3/2018    Procedure: GASTROSCOPY-ENDO;  Surgeon: Ben Negro M.D.;  Location: ENDOSCOPY [facility];  Service: Gastroenterology    CYSTOSCOPY STENT PLACEMENT  11/9/2010    Performed by ANGEL HARRIS at SURGERY [facility]    URETEROSCOPY  11/9/2010    Performed by ANGEL HARRIS at SURGERY [facility]    LASERTRIPSY  11/9/2010    Performed by ANGEL HARRIS at SURGERY [facility]    STENT REMOVAL  11/9/2010    Performed by ANGEL HARRIS at SURGERY [facility]    CYSTO STENT PLACEMNT PRE SURG  10/7/2010    Performed by ANGEL HARRIS at SURGERY [facility]    OTHER Left 2010    Leg    HERNIA REPAIR  1977       Physical Exam:  Vitals/ General Appearance:   Weight/BMI: Body mass index is 31.83 kg/m².  BP (!) 150/76   Pulse 86   Temp 36.4 °C (97.5 °F) (Oral)   Resp 18   Ht 1.6 m (5' 3\")   Wt 81.5 kg (179 lb 10.8 oz)   SpO2 99%   Vitals:    10/22/22 2011 10/22/22 2349 10/23/22 0445 " 10/23/22 0702   BP: (!) 142/82 (!) 145/83 123/79 (!) 150/76   Pulse: 93 83 73 86   Resp: 18 18 18 18   Temp: 36.7 °C (98 °F) 36.9 °C (98.4 °F) 36.8 °C (98.3 °F) 36.4 °C (97.5 °F)   TempSrc: Oral Oral Oral Oral   SpO2: 99% 99% 98% 99%   Weight:   81.5 kg (179 lb 10.8 oz)    Height:         Oxygen Therapy:  Pulse Oximetry: 99 %, O2 (LPM): 2.5, O2 Delivery Device: Nasal Cannula    Physical Exam  Vitals reviewed.   Constitutional:       General: She is not in acute distress.  HENT:      Head: Normocephalic and atraumatic.   Cardiovascular:      Rate and Rhythm: Regular rhythm.      Heart sounds: Normal heart sounds. No murmur heard.    No gallop.   Pulmonary:      Comments: Normal breath sounds anteriorly.  Abdominal:      Palpations: Abdomen is soft. There is no mass.      Tenderness: There is no abdominal tenderness.   Psychiatric:         Judgment: Judgment normal.       Review of Systems:  Review of Systems   Respiratory:  Negative for cough and shortness of breath.    Cardiovascular:  Negative for chest pain.   Gastrointestinal:  Positive for diarrhea. Negative for blood in stool, melena and vomiting.           Zachary Pate M.D.

## 2022-10-23 NOTE — PROGRESS NOTES
Monitor Summary:    Rhythm:  SR   Rate Range:  79-97  Ectopy: rPVC    Measurements:  0.14/0.08/0.38  (Measured by monitor tech)

## 2022-10-23 NOTE — CARE PLAN
The patient is Stable - Low risk of patient condition declining or worsening    Shift Goals  Clinical Goals: pain mangement and IVF  Patient Goals: rest and sleep  Family Goals: SHAI    Progress made toward(s) clinical / shift goals:  .    Patient is not progressing towards the following goals:

## 2022-10-23 NOTE — DISCHARGE SUMMARY
Patient on discharge is refusing oxygen.  The patient drops down to 87% with activity on room air.  At this point patient would not technically require oxygen.  Patient at this point discharging AGAINST MEDICAL ADVICE without oxygen.

## 2022-10-23 NOTE — DISCHARGE PLANNING
Case Management Discharge Planning    Admission Date: 10/20/2022  GMLOS: 3.1  ALOS: 3    6-Clicks ADL Score: 21  6-Clicks Mobility Score: 21      Anticipated Discharge Dispo:  Home with DME oxygen    DME Needed: Yes-oxygen        Action(s) Taken: Discussed pt in IDT rounds. Per MD, pt is clear to d/c today. LSW inquired about pt's oxygen needs. MD spoke with pt. Per MD, pt does not use oxygen at baseline, MD to order oxygen once walk test is completed.     LSW spoke with pt after rounds. Pt gave verbal consent to send DME referral to [facility]. Pt states her dtr is picking her up.     1158: LSW notified by MD that pt is refusing oxygen and is discharging AMA without oxygen.    Escalations Completed: Provider    Medically Clear: Yes    Next Steps: LSW to send oxygen referral once order is placed.    Barriers to Discharge: Oxygen Delivery    Is the patient up for discharge tomorrow: No, today once oxygen is delivered.

## 2022-10-23 NOTE — CARE PLAN
The patient is Stable - Low risk of patient condition declining or worsening    Shift Goals  Clinical Goals: manage pain, EGD/Colonoscopy  Patient Goals: go home  Family Goals: SHAI    Progress made toward(s) clinical / shift goals:        Problem: Respiratory  Goal: Patient will achieve/maintain optimum respiratory ventilation and gas exchange  Outcome: Progressing     Problem: Knowledge Deficit - Standard  Goal: Patient and family/care givers will demonstrate understanding of plan of care, disease process/condition, diagnostic tests and medications  Outcome: Progressing       Patient is not progressing towards the following goals:

## 2022-10-23 NOTE — PROGRESS NOTES
Received DC orders for Ms Marquis.  Home O2 evaluation done as ordered.  Dr. De Guzman updated on evaluation results. Ms Marquis stated that she would not be needing nor would be using home O2.  I personally educated Ms Marquis of the need for home O2 during times when she's up and about, and not while at rest. Education also included the risk for hypoxemia.   Ms Marquis understood the risks and was firm in her decision.  AVS reviewed with the patient.  Made her aware of new prescriptions and confirmed her pharmacy of choice.  LDAs removed per protocol. Dr. De Guzman aware and discharged home as ordered with family.

## 2022-10-23 NOTE — DISCHARGE SUMMARY
Discharge Summary    CHIEF COMPLAINT ON ADMISSION  Chief Complaint   Patient presents with    Hypotension    Diarrhea    Weakness       Reason for Admission  EMS     Admission Date  10/20/2022    CODE STATUS  Full Code    HPI & HOSPITAL COURSE  Ms. Ashleigh Carter is a 61-year-old female comes into the hospital with ongoing diarrhea.  The patient has been recently started on immunotherapy for her breast cancer.  Afterwards the patient has developed diarrhea.  Gastroenterology consultation was requested.  At this point gastroenterology believes that the diarrhea is secondary to the immunosuppressant.  The patient at this point has been evaluated an EGD as well as colonoscopy and both of them have found no acute findings.  Biopsies were taken and the results will be discussed at the GI office.  Patient's electrolytes were of course abnormal due to the ongoing diarrhea.  Patient has remained hypokalemic, hypomagnesemic, also with hypophosphatemia and hyponatremia.  These now have all been corrected.  Patient will need to continue with oral potassium management.  We have initiated her at this point with Imodium.  She will need follow-ups with the primary care physician so that her potassium levels can be monitored and potassium administration can be decreased as her potassium improves and her diarrhea resolves.  I have attempted to reach out to the primary oncologist I have not heard back from him.  At this point patient will need to continue treatment as an outpatient and continue medication management as an outpatient.  Gastroenterology have discussed the care at this point and is safe for the patient to discharge and gastroenterology has cleared the patient for discharge as well.  Prior to discharge an MRI of the head was performed to make sure that the patient does not have any distant meta stasis that would account for her initial altered mental status.  Mental status initially was altered and this has now  resolved.    Therefore, she is discharged in good and stable condition to home with close outpatient follow-up.    The patient met 2-midnight criteria for an inpatient stay at the time of discharge.    Discharge Date  10/23/2022    FOLLOW UP ITEMS POST DISCHARGE  Follow-up with the primary care physician in 2 to 3 days  Follow-up with oncology as scheduled  Follow-up with gastroenterology in 2 to 3 weeks with follow-ups with the biopsy results    DISCHARGE DIAGNOSES  Principal Problem:    Hypotension POA: Yes  Active Problems:    Diarrhea POA: Unknown    Acute renal failure with tubular necrosis (HCC) POA: Unknown    Hypokalemia POA: Unknown    Hypomagnesemia POA: Unknown    High anion gap metabolic acidosis POA: Yes    Hyponatremia POA: Unknown    Malignant neoplasm of central portion of breast in female, estrogen receptor negative (HCC) POA: Yes    Debility POA: Yes  Resolved Problems:    * No resolved hospital problems. *      FOLLOW UP  Future Appointments   Date Time Provider Department Center   10/27/2022 11:15 AM [facility] [employer] [facility] [place]   11/11/2022 10:00 AM Taj Bailey M.D. [facility] None   [facility]2022 [facility]     Ciaran Montanez, P.AYasmin  [address] [facility] [id number] [phone]    Schedule an appointment as soon as possible for a visit      Zachary Pate M.D.  [address] [facility] [id number] [phone]    Follow up  If symptoms worsen      MEDICATIONS ON DISCHARGE     Medication List        START taking these medications        Instructions   loperamide 2 MG Caps  Commonly known as: IMODIUM   Take 1 Capsule by mouth 4 times a day as needed for Diarrhea.  Dose: 2 mg     nystatin powder  Commonly known as: MYCOSTATIN   Apply 1 g topically 2 times a day.  Dose: 1 Application     potassium chloride SA 20 MEQ Tbcr  Commonly known as: Kdur   Take 2 Tablets by mouth 2 times a day for 10 days.  Dose: 40 mEq            CHANGE how you take these  medications        Instructions   predniSONE 10 MG Tabs  What changed:   how much to take  how to take this  when to take this  additional instructions  Commonly known as: DELTASONE   Take 10 mg twice daily for 3 days than take 5 mg twice daily for 3 days than take 5 mg daily for 3 days than stop            CONTINUE taking these medications        Instructions   acetaminophen 500 MG Tabs  Commonly known as: TYLENOL   Take 1,000 mg by mouth 2 times a day as needed. Indications: Pain  Dose: 1,000 mg     carisoprodol 350 MG Tabs  Commonly known as: SOMA   Take 1 Tablet by mouth every 6 hours as needed for Muscle Spasms for up to 28 days.  Dose: 350 mg     clonazepam 2 MG tablet  Commonly known as: KLONOPIN   Take 2 mg by mouth 3 times a day as needed.  Dose: 2 mg     diphenoxylate-atropine 2.5-0.025 MG Tabs  Commonly known as: LOMOTIL   Take 1 Tablet by mouth 4 times a day as needed for Diarrhea.  Dose: 1 Tablet     omeprazole 40 MG delayed-release capsule  Commonly known as: PRILOSEC   Take 40 mg by mouth every day.  Dose: 40 mg     ondansetron 4 MG Tabs tablet  Commonly known as: ZOFRAN   Take 1 Tablet by mouth every four hours as needed for Nausea/Vomiting (for nausea, vomiting).  Dose: 4 mg     oxyCODONE immediate release 10 MG immediate release tablet  Commonly known as: ROXICODONE   Take 10 mg by mouth 6 Times a Day.  Dose: 10 mg     prochlorperazine 10 MG Tabs  Commonly known as: COMPAZINE   Take 1 Tablet by mouth every 6 hours as needed (for nausea, vomiting).  Dose: 10 mg     Ventolin  (90 Base) MCG/ACT Aers inhalation aerosol  Generic drug: albuterol   Inhale 2 Puffs 1 time a day as needed for Shortness of Breath.  Dose: 2 Puff            STOP taking these medications      HYDROcodone/acetaminophen  MG Tabs  Commonly known as: NORCO     lisinopril 10 MG Tabs  Commonly known as: PRINIVIL     MULTIVITAMIN ADULT PO     NARCAN NA              Allergies  Allergies   Allergen Reactions    Sulfa Drugs  Vomiting    Toradol Vomiting    Aspirin Vomiting     Severely sick as a child.        DIET  Orders Placed This Encounter   Procedures    Diet Order Diet: Regular     Standing Status:   Standing     Number of Occurrences:   1     Order Specific Question:   Diet:     Answer:   Regular [1]       ACTIVITY  As tolerated.  Weight bearing as tolerated    CONSULTATIONS  Gastroenterology    PROCEDURES  Colonoscopy and EGD    LABORATORY  Lab Results   Component Value Date    SODIUM 138 10/23/2022    POTASSIUM 3.9 10/23/2022    CHLORIDE 112 10/23/2022    CO2 19 (L) 10/23/2022    GLUCOSE 108 (H) 10/23/2022    BUN 22 10/23/2022    CREATININE 0.65 10/23/2022    CREATININE 0.8 05/01/2009        Lab Results   Component Value Date    WBC 5.9 10/22/2022    HEMOGLOBIN 14.3 10/22/2022    HEMATOCRIT 41.9 10/22/2022    PLATELETCT 277 10/22/2022        Total time of the discharge process exceeds 37 minutes.

## 2022-11-03 PROBLEM — A41.9 SEPSIS (HCC): Status: ACTIVE | Noted: 2022-01-01

## 2022-11-03 PROBLEM — E87.6 HYPOKALEMIA: Status: ACTIVE | Noted: 2022-01-01

## 2022-11-03 PROBLEM — J96.01 ACUTE RESPIRATORY FAILURE WITH HYPOXIA (HCC): Status: ACTIVE | Noted: 2022-01-01

## 2022-11-03 PROBLEM — T40.601A NARCOTIC OVERDOSE, ACCIDENTAL OR UNINTENTIONAL, INITIAL ENCOUNTER (HCC): Status: ACTIVE | Noted: 2022-01-01

## 2022-11-03 PROBLEM — E87.20 METABOLIC ACIDOSIS: Status: ACTIVE | Noted: 2022-01-01

## 2022-11-03 PROBLEM — N17.9 ACUTE KIDNEY INJURY (HCC): Status: ACTIVE | Noted: 2022-01-01

## 2022-11-03 NOTE — ED PROVIDER NOTES
"ED Provider Note    Scribed for Vasu Peguero M.D. by Nano Andrews. 11/3/2022  3:20 PM    Primary care provider: None noted  Means of arrival: EMS  History obtained from: Patient  History limited by: Patient's clinical condition of being unresponsive.     CHIEF COMPLAINT  Chief Complaint   Patient presents with    ALOC     Pt found down by . Pt was cyanotic and cold on scene. [facility] 11/1          [facility]  Nima Russ is a 89 y.o. adult who presents to the Emergency Department via EMS for evaluation of unresponsiveness onset prior to arrival. Patient last known well on 11/1/22. Per EMS, daughter was concerned about the patient. Daughter had Social Work come and check on the patient. Upon their arrival, patient was found to be cyanotic and cold on scene. EMS was then called. Upon EMS arrival to the ED, patient was still unresponsive. Patient is on 15 liters via NRB with an SpO2 of 80%. Blood pressure is 115/77 with a heart rate of 194 bpm. No fevers. No known drug allergies.     Further history of present illness cannot be obtained due to the patient's clinical condition or unresponsiveness.     REVIEW OF SYSTEMS  Pertinent negatives include no fevers.   See HPI for further details.   Further review of systems cannot be obtained due to the patient's clinical condition or unresponsiveness.     PAST MEDICAL HISTORY   None noted    SURGICAL HISTORY  patient denies any surgical history    SOCIAL HISTORY    None noted    FAMILY HISTORY  No family history noted    CURRENT MEDICATIONS  None noted    ALLERGIES  None noted    PHYSICAL EXAM  VITAL SIGNS: Ht 1.676 m (5' 6\")   Wt 83.9 kg (185 lb)   BMI 29.86 kg/m²     Constitutional: Well developed, Well nourished, No acute distress, Non-toxic appearance.   HENT: Normocephalic, Atraumatic, Bilateral external ears normal, Oropharynx is clear mucous membranes are moist. No oral exudates or nasal discharge.   Eyes: Pupils are equal round and reactive, EOMI, Conjunctiva " normal, No discharge.   Neck: Normal range of motion, No tenderness, Supple, No stridor. No meningismus.  Lymphatic: No lymphadenopathy noted.   Cardiovascular: Regular rate and rhythm without murmur rub or gallop.  Thorax & Lungs: Clear breath sounds bilaterally without wheezes, rhonchi or rales. There is no chest wall tenderness.   Abdomen: Soft non-tender non-distended. There is no rebound or guarding. No organomegaly is appreciated. Bowel sounds are normal.  Skin: Normal without rash.   Back: No CVA or spinal tenderness.   Extremities: Intact distal pulses, No edema, No tenderness, No cyanosis, No clubbing. Capillary refill is less than 2 seconds.  Musculoskeletal: Good range of motion in all major joints. No tenderness to palpation or major deformities noted.   Neurologic: Patient's eyes are open and when forced closed, she will open them and has spontaneous movement. Nonpurposeful movement, but there is movement in all four extremities. Nonverbal.   Psychiatric: Affect normal, Judgment normal, Mood normal. There is no suicidal ideation or patient reported hallucinations.       DIAGNOSTIC STUDIES / PROCEDURES    LABS  Labs Reviewed   LACTIC ACID - Abnormal; Notable for the following components:       Result Value    Lactic Acid 3.1 (*)     All other components within normal limits   CBC WITH DIFFERENTIAL - Abnormal; Notable for the following components:    WBC 16.0 (*)     MCHC 36.5 (*)     Neutrophils-Polys 90.40 (*)     Lymphocytes 2.60 (*)     Neutrophils (Absolute) 14.47 (*)     Lymphs (Absolute) 0.42 (*)     Monos (Absolute) 0.99 (*)     All other components within normal limits   COMP METABOLIC PANEL - Abnormal; Notable for the following components:    Sodium 133 (*)     Co2 7 (*)     Anion Gap 27.0 (*)     Glucose 188 (*)     Bun 154 (*)     Creatinine 7.31 (*)     Calcium 10.8 (*)     AST(SGOT) 53 (*)     Alkaline Phosphatase 156 (*)     All other components within normal limits   ESTIMATED GFR -  "Abnormal; Notable for the following components:    GFR (CKD-EPI) 6 (*)     All other components within normal limits   DIAGNOSTIC ALCOHOL   LACTIC ACID   LACTIC ACID   URINALYSIS   URINE CULTURE(NEW)   BLOOD CULTURE   BLOOD CULTURE    Narrative:     Per Hospital Policy: Only change Specimen Src: to \"Line\" if  specified by physician order.   ACETAMINOPHEN   SALICYLATE   URINE DRUG SCREEN   PROCALCITONIN   COV-2, FLU A/B, AND RSV BY PCR (CEPHEID)   MRSA BY PCR (AMP)      All labs reviewed by me.    RADIOLOGY  DX-CHEST-PORTABLE (1 VIEW)   Final Result      1.  No acute cardiopulmonary abnormality.   2.  15 mm left upper lung nodule which is nonspecific. Further evaluation with chest CT may be performed.   3.  No consolidation or pleural effusion.   4.  Nasogastric tube side port is at the GE junction. Recommend advancement.      DX-CHEST-PORTABLE (1 VIEW)    (Results Pending)   DX-CHEST-PORTABLE (1 VIEW)    (Results Pending)     The radiologist's interpretation of all radiological studies have been reviewed by me.    Intubation Procedure Note    Indication: Respiratory failure    Consent: Unable to be obtained due to the emergent nature of this procedure.    Medications Used: ketamine 50 mg  intramuscularly and propofol 10 mg intravenously    Procedure: The patient was placed in the appropriate position.  Cricoid pressure was utilized.  Intubation was performed by direct laryngoscopy using a laryngoscope and a 7.5 cuffed endotracheal tube.  The cuff was then inflated and the tube was secured appropriately at a distance of 20 cm to the dental ridge.  Initial confirmation of placement included bilateral breath sounds.  A chest x-ray to verify correct placement of the tube showed the tube needed advancing and the tube was repositioned accordingly.    The patient tolerated the procedure well.     Complications: None        Central Line Placement Procedure Note  Indication: centrally administered medications    Consent: " Unable to be obtained due to the emergent nature of this procedure.    Procedure: The patient was positioned appropriately and the skin over the right internal jugular vein was prepped with betadine and draped in a sterile fashion. Local anesthesia was not performed due to the emergent nature of this procedure.  A large bore needle was used to identify the vein.  A guide wire was then inserted into the vein through the needle. A triple lumen catheter was then inserted into the vessel over the guide wire using the Seldinger technique.  All ports showed good, free flowing blood return and were flushed with saline solution.  The catheter was then securely fastened to the skin with sutures and covered with a sterile dressing.  A post procedure X-ray was ordered and is still pending at this time.    The patient tolerated the procedure well.    Complications: None      COURSE & MEDICAL DECISION MAKING  Nursing notes, VS, PMSFHx reviewed in chart.    3:20 PM Patient seen and examined emergently at bedside. Vitals: BP: 115/77, HR:194, SpO2 80% on 15 liters via NRB.     3:22 PM - Paula hugger placed.     3:23 PM - Sepsis protocol initiated.     3:24 PM - Movement and response from patient truong Narcan 2 mg     3:28 PM - Patient now has an SpO2 of 98% with a NRB mask at 15 liters.     3:30 PM - Patient being intubated at this time. See above note for details.     3:37 PM - Ordered for DX-chest, blood culture, Urine culture, UA, CMP, CBC with diff, Urine drug screen, Salicylate, Acetaminophen, and lactic acid for further evaluation. Patient will be treated with Propofol 10 mg/nl and ketamine HCl 50 mg/ml for her symptoms.     3:41 PM - I spoke with the patient's daughter at this time, updating them on the plan of care. Daughter informs me that patient has minimal breast cancer. She denies patient having known SI.     3:45 PM - Patient was reevaluated at bedside. Patient had x ray completed to ensure proper placement of tube.  X-ray shows tip of tube is too high. We will advance it three cm.      3:51 PM - Tube was advanced approximately 3 cm.   3:53 PM - Patient temperature of 30.5 °C.   3:56 PM - Paged Intensivist    Laboratory evaluation reveals white blood cell count of 16,000 with 90% PMN shift and electrolyte panel showing low potassium state at 2.8 with a bicarb of 7 and anion gap of 27.  Glucose is 188.  I think that this may be sepsis rather than DKA but we will continue to treat for possibly both.  Lactate of 3.1    4:00 PM - Patient will be treated with Levophed 8 mg for her symptoms.     4:03 PM - Intensivist responded. I discussed the patient's case and the above findings with Dr. Blake (Intensivist) who agrees to evaluate the patient's case.     4:15 PM - I completed a Central Line procedure. See above note. Ordered for DX-chest for further evaluation.     4:35 PM - Nurse informs me that patient's CO2 is 7.  Intensivist will start bicarb therapy and we will continue to warm the patient with bear darekgger    CRITICAL CARE  The very real possibilty of a deterioration of this patient's condition required the highest level of my preparedness for sudden, emergent intervention.  I provided critical care services, which included medication orders, frequent reevaluations of the patient's condition and response to treatment, ordering and reviewing test results, and discussing the case with various consultants.  The critical care time associated with the care of the patient was 45 minutes. Review chart for interventions. This time is exclusive of any other billable procedures.     DISPOSITION:  Patient will be hospitalized by Dr. Blake (Intensivist) in critical condition.    FINAL IMPRESSION  1. Overdose of undetermined intent, initial encounter    2. Unresponsive state    3.      Endotracheal intubation by ERP  4.      Central Line Placement by ERP  5.      The critical care time associated with the care of the patient was 45  minutes.     Nano GAN (Scribe), am scribing for, and in the presence of, Vasu Peguero M.D..    Electronically signed by: Nano Andrews (Scribe), 11/3/2022    Vasu GAN M.D. personally performed the services described in this documentation, as scribed by Nano Andrews in my presence, and it is both accurate and complete.    The note accurately reflects work and decisions made by me.  Vasu Peguero M.D.  11/3/2022  5:23 PM

## 2022-11-03 NOTE — ASSESSMENT & PLAN NOTE
History of drug and alcohol abuse in the past with prior overdose attempts, discussed with family  Continue supportive care  Additional history to be obtained from patient once able

## 2022-11-03 NOTE — ED TRIAGE NOTES
Chief Complaint   Patient presents with    ALOC     Pt found down by . Pt was cyanotic and cold on scene. [facility] 11/1      Pt BIB [facility] from home for above complaint. Per report, pt's daughter spoke to the pt on Tuesday (11/1).  Pt's  went to the pt's residence and found her unresponsive, cold, and cyanotic.  On arrival pt was unresponsive with dry corneas.   ERP @ [facility]

## 2022-11-03 NOTE — ED NOTES
Med rec completed historically from med rec done on 10/20/2022  Allergies reviewed historically   Unknown last doses of medications taken, pt unable to participate in an interview

## 2022-11-03 NOTE — H&P
Critical Care History & Physical Note    Date of Service  11/3/2022    Primary Care Physician  LEESA Griffith    Code Status  No Order    Chief Complaint  Chief Complaint   Patient presents with    ALOC     Pt found down by . Pt was cyanotic and cold on scene. [facility] 11/1        History of Presenting Illness  Ashleigh Marquis is a 61 y.o. female with hx of breast CA, chronic pain (on oxycodone 10 Q4H) who was found unresponsive at home by , and reportedly cold and cyanotic on scene. EMS was called and pt was brought to ED on 15L NRB, SpO2 80%. SBP in 110s/70, HR in 190s. Hypothermic at 93F. Narcan 2mg IV x1 given and pt showed some response and there was a concern that patient had opioid overdose. Pt was subsequently intubated at ED, central line placed. Pt later became hypotensive, started on NE gtt. Labs showed creatinine 7.31, K 2.8, sodium 133. Lactate 3.1. WBC 16K. UDS negative except oxycdone. Acetaminophen, alcohol and salicylates are all negatives. UA no pyuria. Pt looks very dry    Review of Systems  Review of Systems   Reason unable to perform ROS: intubated, RASS -5, unable to perform.     Past Medical History   has no past medical history on file.    Surgical History   has no past surgical history on file.     Family History  family history is not on file.   Family history reviewed with patient. There is family history that is pertinent to the chief complaint.     Social History       Allergies  Allergies   Allergen Reactions    Aspirin Unspecified     Severely sick as a child     Sulfa Drugs Vomiting    Toradol Vomiting       Medications  Prior to Admission Medications   Prescriptions Last Dose Informant Patient Reported? Taking?   HYDROcodone/acetaminophen (NORCO)  MG Tab UNK at UNK Historical Yes Yes   Sig: Take 1-2 Tablets by mouth 4 times a day as needed for Severe Pain.   NALOXONE HCL NA UNK at UNK Historical Yes Yes   Sig: Administer 1 Dose into affected nostril(S)  1 time a day as needed.   acetaminophen (TYLENOL) 500 MG Tab UNK at K Historical Yes Yes   Sig: Take 1,000 mg by mouth 2 times a day as needed. Indications: Pain   albuterol 108 (90 Base) MCG/ACT Aero Soln inhalation aerosol UNK at K Historical Yes Yes   Sig: Inhale 2 Puffs 1 time a day as needed for Shortness of Breath.   carisoprodol (SOMA) 350 MG Tab UNK at K Historical Yes Yes   Sig: Take 350 mg by mouth every 6 hours as needed for Muscle Spasms.   clonazepam (KLONOPIN) 2 MG tablet UNK at K Historical Yes Yes   Sig: Take 2 mg by mouth 3 times a day as needed. Indications: Feeling Anxious   diphenoxylate-atropine (LOMOTIL) 2.5-0.025 MG Tab UNK at K Historical Yes Yes   Sig: Take 1 Tablet by mouth 4 times a day as needed for Diarrhea.   lisinopril (PRINIVIL) 10 MG Tab UNK at [facility] Historical Yes Yes   Sig: Take 10 mg by mouth every day.   multivitamin (THERAGRAN) Tab UNK at K Historical Yes Yes   Sig: Take 1 Tablet by mouth every day.   omeprazole (PRILOSEC) 40 MG delayed-release capsule UNK at [facility] Historical Yes Yes   Sig: Take 40 mg by mouth every day.   ondansetron (ZOFRAN ODT) 4 MG TABLET DISPERSIBLE UNK at K Historical Yes Yes   Sig: Take 4 mg by mouth every four hours as needed for Nausea.   oxyCODONE immediate release (ROXICODONE) 10 MG immediate release tablet UNK at K Historical Yes Yes   Sig: Take 10 mg by mouth 6 Times a Day.   predniSONE (DELTASONE) 10 MG Tab UNK at K Historical Yes Yes   Sig: Take 10 mg by mouth every day.   prochlorperazine (COMPAZINE) 10 MG Tab UNK at K Historical Yes Yes   Sig: Take 10 mg by mouth every 6 hours as needed for Nausea/Vomiting.      Facility-Administered Medications: None       Physical Exam                             Physical Exam  Vitals and nursing note reviewed.   Constitutional:       Appearance: She is ill-appearing and toxic-appearing.   HENT:      Head: Normocephalic and atraumatic.      Mouth/Throat:      Comments: ET tube in place  Eyes:       Comments: Bilateral pinpoint pupil ~ 2mm, not reactive to lights.   I re-examined the patient 3-4 hours later and pupils were improved to 4mm and reactive to light bilaterally   Cardiovascular:      Rate and Rhythm: Normal rate and regular rhythm.      Pulses: Normal pulses.      Heart sounds: Normal heart sounds. No murmur heard.     Comments: Right chest portacath noted  Pulmonary:      Effort: No respiratory distress.      Breath sounds: Normal breath sounds. No wheezing, rhonchi or rales.   Abdominal:      General: There is no distension.      Palpations: Abdomen is soft.      Tenderness: There is no abdominal tenderness.   Musculoskeletal:      Right lower leg: No edema.      Left lower leg: No edema.   Skin:     General: Skin is dry.      Capillary Refill: Capillary refill takes more than 3 seconds.      Coloration: Skin is not jaundiced.      Findings: No bruising or rash.   Neurological:      Comments: Intubated, not awake to verbal and painful stimuli       Laboratory:          No results for input(s): ALTSGPT, ASTSGOT, ALKPHOSPHAT, TBILIRUBIN, DBILIRUBIN, GAMMAGT, AMYLASE, LIPASE, ALB, PREALBUMIN, GLUCOSE in the last 72 hours.      No results for input(s): NTPROBNP in the last 72 hours.      No results for input(s): TROPONINT in the last 72 hours.    Imaging:  DX-CHEST-PORTABLE (1 VIEW)   Final Result      1.  No acute cardiopulmonary abnormality.   2.  15 mm left upper lung nodule which is nonspecific. Further evaluation with chest CT may be performed.   3.  No consolidation or pleural effusion.   4.  Nasogastric tube side port is at the GE junction. Recommend advancement.          X-Ray:  I have personally reviewed the images and compared with prior images.    Assessment/Plan:  * Sepsis (HCC)  Assessment & Plan  This is Septic shock Present on admission  SIRS criteria identified on my evaluation include: Hypothermia, with temperature less than 96.8 deg F  Indicators of septic shock include: Severe  sepsis present, persistent hypotension after 30 ml/kg completed, and initial lactate level result is >= 4 mmol/L.   Sources is: unknown at this time  Sepsis protocol initiated  Crystalloid Fluid Administration: Fluid resuscitation ordered per standard protocol - 30 mL/kg per current or ideal body weight  IV antibiotics as appropriate for source of sepsis  Reassessment: I have reassessed the patient's hemodynamic status    Likely combination of severe intravascular depletion +/- sepsis. Aspiration vs others.   Has portacath - unclear when it was inserted.   On NE gtt. Goal to keep MAP >65  Hydrocortisone 50 Q6H  Start piptazo and linezolid empirically   MRSA screen  Blood cultures x2  Serial lactates  Bicarb gtt at 200cc/hr    Acute respiratory failure with hypoxia (HCC)  Assessment & Plan  Unresponsive, cyanotic, unable to get pulse ox, subsequently intubated at ED    Plan:   Rule out COVID/influenza, isolate pending results.   Continue full vent support  Goal sat >90%  Did bronch with BAL from RML - appears mildly aspirated. BAL sample sent to micro lab  On empiric linezolid/piptazo  Keep off sedation as long as pt not RASS <+2    Hypokalemia  Assessment & Plan  K 2.8 at admission, will replete    Acute kidney injury (HCC)  Assessment & Plan  Creatinine in 7, K 2.8  Suspect severe intravascular depletion   Pt received 4L fluid boluses at ED  Continue with bicarb gtt.   K scale to keep K >4  Monitor UOP closely   No need for urgent/emergent dialysis      Metabolic acidosis  Assessment & Plan  Profound metabolic acidosis with pH 7.0  3 amps of bicarb  S/p 4L LR fluid boluses  UDS, toxic screen negative to date.   Continue with bicarb gtt at 200cc/hr.     Narcotic overdose, accidental or unintentional, initial encounter ([facility])- (present on admission)  Assessment & Plan  Received 2mg of narcan at ED and pt was responsive somewhat.  Pt later subsequently intubated at ED  UDS positive for oxycodone only   Ethanol level,  acetaminophen, salicylate negative.  Received another narcan 0.4mg, but no response       VTE prophylaxis: enoxaparin ppx      The patient remains critically ill. Critical care time = 95 mins in directly providing and coordinating critical care and extensive data review. No time overlap and excludes procedures.

## 2022-11-04 PROBLEM — G93.41 ACUTE METABOLIC ENCEPHALOPATHY: Status: ACTIVE | Noted: 2022-01-01

## 2022-11-04 PROBLEM — T68.XXXA HYPOTHERMIA ASSOCIATED WITH ENVIRONMENTAL CHANGE: Status: ACTIVE | Noted: 2022-01-01

## 2022-11-04 PROBLEM — E83.42 HYPOMAGNESEMIA: Status: ACTIVE | Noted: 2022-01-01

## 2022-11-04 PROBLEM — R57.9 SHOCK (HCC): Status: ACTIVE | Noted: 2022-01-01

## 2022-11-04 NOTE — ASSESSMENT & PLAN NOTE
Undifferentiated, large volume depletion component -improved, weaned off Levophed 11/5  Discontinue IV fluids once able to tolerate oral hydration  S/p empiric stress dose steroids x3 days (completes today)

## 2022-11-04 NOTE — ASSESSMENT & PLAN NOTE
Secondary to severe intravascular depletion with ATN component - slowly improving daily, non-oliguric  Status post aggressive fluid resuscitation  D/c MIVFs when able to tolerate oral hydration  Monitor urine output, creatinine, electrolytes closely  Avoid nephrotoxins

## 2022-11-04 NOTE — DISCHARGE PLANNING
Care Transition Team Discharge Planning    Anticipated Discharge Information  Discharge Disposition: Discharged to home/self care (01)      Discharge Plan:  Chart review and assessment completed from information obtained. Patient was BIB EMS from home after being found down by  ,  PT live in an apartment alone.  Daughter Katalina Sethi [phone] is involved with care, also request Yanelis Carter ( sister) [phone] be provided updates and make medical decisions. Pt independent prior to admission, no DME , Pt currently on vent day 2,sedated, on levo . Anticipate IPR vs home .     Care Transition Team Assessment    Information Source  Orientation Level: Unable to assess    Readmission Evaluation  Is this a readmission?: Yes - unplanned readmission  Why do you think you were readmitted?:  (Pt left AMA on last admission)    Elopement Risk  Legal Hold: No  Ambulatory or Self Mobile in Wheelchair: No-Not an Elopement Risk  Elopement Risk: Not at Risk for Elopement    Interdisciplinary Discharge Planning  Support Systems: Children (sibling)  Housing / Facility: 1 Story Apartment / Condo  Durable Medical Equipment: Not Applicable    Discharge Preparedness  What is your plan after discharge?: Home with help  What are your discharge supports?: Child, Sibling  Prior Functional Level: Ambulatory    Functional Assesment  Prior Functional Level: Ambulatory    Finances  Financial Barriers to Discharge: No  Prescription Coverage: Yes     Advance Directive  Advance Directive?: None    Discharge Risks or Barriers  Discharge risks or barriers?: No    Anticipated Discharge Information  Discharge Disposition: Discharged to home/self care (01)    Angle RUVALCABA, RN CCM   Care Coordinator

## 2022-11-04 NOTE — CARE PLAN
Problem: Ventilation  Goal: Ability to achieve and maintain unassisted ventilation or tolerate decreased levels of ventilator support  Description: Target End Date:  4 days     Document on Vent flowsheet    1.  Support and monitor invasive and noninvasive mechanical ventilation  2.  Monitor ventilator weaning response  3.  Perform ventilator associated pneumonia prevention interventions  4.  Manage ventilation therapy by monitoring diagnostic test results  Outcome: Progressing      Ventilator Daily Summary    Vent Day # 2    Ventilator settings:  16/400/+8/40%    Weaning trials:    Respiratory Procedures: Bronchoscopy done, sample sent to lab    Plan: Continue current ventilator settings and wean mechanical ventilation as tolerated per physician orders.    Vent Mode: APVCMV  Rate (breaths/min):  [16-24] 16  PEEP/CPAP:  [8] 8  PIP:  [14-21] 17  MAP:  [12-13] 12

## 2022-11-04 NOTE — ASSESSMENT & PLAN NOTE
Secondary to drug overdose/narcotics, possible anoxic brain injury, also found to have bifrontal small SAH (likely traumatic) - improving clinically  Limit sedatives with close neurologic monitoring  SLP for cognitive eval

## 2022-11-04 NOTE — PROCEDURES
BRONCHOSCOPY PROCEDURE NOTE      Date: 11/3/2022  Time: 8:45 PM    Time out: performed. Name, MRN, allergy and procedure were confirmed.     Indication: acute hypoxic resp failure     Consent: Informed consent obtained from patient or designated decision maker after explaining the benefits/risks of the procedure including but not limited to bleeding, infection, airway trauma or loss thereof, pneumothorax/hemothorax, arrythmia, or death. Patient or surrogate expressed understanding and agreement and signed consent which can be found in the patient's chart.    Procedure: Bronchoscopy with bronchoalveolar lavage and therapeutic aspiration of secretions    Sedation: none    Findings:  Respiratory therapy and nursing at bedside throughout procedure. Patient provided sedation and analgesia throughout the procedure. Placed on full ventilator support with an FiO2 of 100% during procedure. Using a fiberoptic bronchoscope, trachea entered via ET tube.      Immediately noted a good amount of thick gooey light brown mucus was noted at marc which was suctioned. BAL was done in RML/RLL. No obvious purulence noted. Also noted mild amount of brown/greenish thick mucus in lingula. Right main stem, RUL, bronchus intermedius, RLL/RML, left main stem, lingula, NROEEN and LLL were all explored and cleaned.     All secretions were suctioned and cleared.     Specimen sent to microbiology/pathology: cell count, routine gs/cx, fungal/AFB smear/culture,     Complications:   Patient tolerated procedure well without any difficulties and left in care of bedside nurse/RT.     Estimated blood loss: none    No conscious sedation was done as patient was not responding. No propofol given    Kaiden Blake D.O.  Critical Care Medicine

## 2022-11-04 NOTE — PROGRESS NOTES
[place] from [facility] to R100 [place] RN and [place] [facility] [place]     Gtts infusing on arrival (see MAR for titrations): Levo and propofol    VS on arrival:  HR: 96 )  BP (140/76):  RR:24  SpO2:100  Temp (88.7):  Weight (bed scale): 75.10 kg    2-RN skin exam performed by Chilo RN and Durga RN. Noted as follows:     Devices in place include: Bilateral SCD's, BP cuff, SpO2 finger probe, EKG leads and electrode stickers, Paula hugger james      Head WDL  Ears WDL  Nose WDL  Mouth WDL   Neck WDL  Breast/Chest WDL  Shoulder Blades WDL  Spine WDL  (R) Arm/Elbow/Hand WDL  (L) Arm/Elbow/Hand WDL  Abdomen WDL  Groin WDL  Scrotum/Coccyx/Buttocks Redness  (R) Leg WDL  (L) Leg WDL  (R) Heel/Foot/Toe dry heels  (L) Heel/Foot/Toe dry heels            Interventions In Place Heel Mepilex, Sacral Mepilex, Heel Float Boots, Q2 Turns, Low Air Loss Mattress, and Pressure Redistribution Mattress    Possible Skin Injury Yes    Pictures Uploaded Into Epic Yes  Wound Consult Placed Yes  RN Wound Prevention Protocol Ordered Yes

## 2022-11-04 NOTE — PROGRESS NOTES
"Critical Care Progress Note    Date of admission  11/3/2022    Chief Complaint  61 y.o. female admitted 11/3/2022 with AMS, resp failure    Hospital Course  \"Ashleigh Marquis is a 61 y.o. female with hx of breast CA, chronic pain (on oxycodone 10 Q4H) who was found unresponsive at home by , and reportedly cold and cyanotic on scene. EMS was called and pt was brought to ED on 15L NRB, SpO2 80%. SBP in 110s/70, HR in 190s. Hypothermic at 93F. Narcan 2mg IV x1 given and pt showed some response and there was a concern that patient had opioid overdose. Pt was subsequently intubated at ED, central line placed. Pt later became hypotensive, started on NE gtt. Labs showed creatinine 7.31, K 2.8, sodium 133. Lactate 3.1. WBC 16K. UDS negative except oxycdone. Acetaminophen, alcohol and salicylates are all negatives. UA no pyuria. Pt looks very dry\" - Dr. Blake 11/3    Interval Problem Update  Reviewed last 24 hour events:   - hypothermia improving   - sinus tach, SBP    - levophed gtt @ 15   - prop 25, fent 200, RASS -4, not following   - increased WBC   - K up to 4.2   - improving AYDEE, remains oliguric   - bicarb @ 50   - OGT clamped   - low Mag   - UDS (+) oxycodone   - VD # 2, SBT pending   - day 1 zosyn/zyvox, MRSA neg --> d/c zyvox   - hydrocortisone   - elevated glucose   - cultures NGTD    Review of Systems  Review of Systems   Unable to perform ROS: Intubated      Vital Signs for last 24 hours   Temp:  [30.2 °C (86.4 °F)-30.5 °C (86.9 °F)] 30.5 °C (86.9 °F)  Pulse:  [] 103  Resp:  [18-66] 19  BP: ()/(20-84) 123/60  SpO2:  [94 %-100 %] 97 %    Hemodynamic parameters for last 24 hours   IVC measuring 1.35 cm, >50% resp variability    Respiratory Information for the last 24 hours  Vent Mode: APVCMV  Rate (breaths/min): 16  Vt Target (mL): 400  PEEP/CPAP: 8  MAP: 12  Control VTE (exp VT): 430    Physical Exam   Physical Exam  Vitals and nursing note reviewed.   Constitutional:       " General: She is sleeping. She is not in acute distress.     Appearance: Normal appearance. She is well-developed and overweight. She is not toxic-appearing.      Interventions: She is sedated, intubated and restrained.   HENT:      Head: Normocephalic and atraumatic.      Nose: Nose normal. No congestion.      Mouth/Throat:      Mouth: Mucous membranes are moist.      Pharynx: Oropharynx is clear.      Comments: Endotracheal tube and oral gastric tube in place  Eyes:      General: No scleral icterus.     Conjunctiva/sclera: Conjunctivae normal.      Pupils: Pupils are equal, round, and reactive to light.   Neck:      Vascular: No JVD.      Trachea: No tracheal deviation.      Comments: No meningismus, right IJ central venous catheter without surrounding hematoma  Cardiovascular:      Rate and Rhythm: Regular rhythm. Tachycardia present. Occasional Extrasystoles are present.     Pulses: Decreased pulses.           Radial pulses are 1+ on the right side and 1+ on the left side.      Heart sounds: Normal heart sounds. No murmur heard.     Comments: Right chest port noted, remains hypotensive on vasopressor support  Pulmonary:      Effort: Pulmonary effort is normal. No accessory muscle usage. She is intubated.      Breath sounds: Examination of the left-lower field reveals rhonchi. Rhonchi present. No wheezing or rales.      Comments: Good compliance, tolerating spontaneous breathing trial with adequate weaning parameters  Abdominal:      General: Bowel sounds are normal. There is no distension.      Palpations: Abdomen is soft.      Tenderness: There is no abdominal tenderness. There is no guarding or rebound.   Genitourinary:     Comments: Velásquez catheter in place  Musculoskeletal:         General: No tenderness.      Cervical back: Neck supple. No tenderness.      Right lower leg: No edema.      Left lower leg: No edema.   Skin:     General: Skin is warm and dry.      Capillary Refill: Capillary refill takes 2 to 3  seconds.      Coloration: Skin is not pale.   Neurological:      General: No focal deficit present.      Mental Status: She is easily aroused.      Cranial Nerves: No cranial nerve deficit.      Sensory: No sensory deficit.      Comments: Moves all extremities purposefully, not following commands nor making eye contact, no facial droop   Psychiatric:         Behavior: Behavior is uncooperative.       Medications  Current Facility-Administered Medications   Medication Dose Route Frequency Provider Last Rate Last Admin    sodium bicarbonate 150 mEq in D5W infusion (premix)   Intravenous Continuous Sharyn L. Latona 50 mL/hr at 11/04/22 0545 Rate Change at 11/04/22 0545    norepinephrine (Levophed) 8 mg in 250 mL NS infusion (premix)  0-30 mcg/min Intravenous Continuous Vasu Peguero M.D. 28.1 mL/hr at 11/04/22 0616 15 mcg/min at 11/04/22 0616    Respiratory Therapy Consult   Nebulization Continuous RT Kaiden Blake D.O.        famotidine (PEPCID) tablet 20 mg  20 mg Enteral Tube Q12HRS MARGE BacaO.   20 mg at 11/04/22 0522    Or    famotidine (PEPCID) injection 20 mg  20 mg Intravenous Q12HRS MARGE BacaO.   20 mg at 11/03/22 1831    senna-docusate (PERICOLACE or SENOKOT S) 8.6-50 MG per tablet 2 Tablet  2 Tablet Enteral Tube BID Kaiden Blake D.O.        And    polyethylene glycol/lytes (MIRALAX) PACKET 1 Packet  1 Packet Enteral Tube QDAY PRN Kaiden Blake D.O.        And    magnesium hydroxide (MILK OF MAGNESIA) suspension 30 mL  30 mL Enteral Tube QDAY PRN Kaiden Blake D.O.        And    bisacodyl (DULCOLAX) suppository 10 mg  10 mg Rectal QDAY PRN Kaiden Blake D.O.        MD Alert...ICU Electrolyte Replacement per Pharmacy   Other PHARMACY TO DOSE Kaiden Blake D.O.        lidocaine (XYLOCAINE) 1 % injection 2 mL  2 mL Tracheal Tube Q30 MIN PRN Kaiden Blake D.O.        fentaNYL (SUBLIMAZE) injection 100 mcg  100 mcg Intravenous Q15 MIN PRN Kaiden Blake D.O.   100 mcg at 11/04/22 0328    And    fentaNYL (SUBLIMAZE)  injection 200 mcg  200 mcg Intravenous Q15 MIN PRN Kaiden Blake D.O.        And    fentaNYL (SUBLIMAZE) 50 mcg/mL in 50mL (Continuous Infusion)   Intravenous Continuous MARLINE Baca.O. 2 mL/hr at 11/04/22 0559 100 mcg/hr at 11/04/22 0559    And    propofol (DIPRIVAN) injection  0-80 mcg/kg/min Intravenous Continuous Kaiden Blake D.O. 12.6 mL/hr at 11/04/22 0418 25 mcg/kg/min at 11/04/22 0418    piperacillin-tazobactam (Zosyn) 4.5 g in  mL IVPB  4.5 g Intravenous Q8HRS MARLINE Baca.O. 25 mL/hr at 11/04/22 0528 4.5 g at 11/04/22 0528    hydrocortisone sodium succinate PF (Solu-CORTEF) 100 MG injection 50 mg  50 mg Intravenous Q6HRS Kaiden Blake D.O.   50 mg at 11/04/22 0524    vasopressin (Vasostrict) 20 Units in  mL Infusion  0.03 Units/min Intravenous Continuous MARLINE Baca.O.        K+ Scale: Goal of 4.5  1 Each Intravenous Q6HRS MARLINE Baca.O.   1 Each at 11/04/22 0000    Linezolid (ZYVOX) premix 600 mg  600 mg Intravenous Q12HRS MARLINE Baca.O.   Stopped at 11/03/22 2214       Fluids    Intake/Output Summary (Last 24 hours) at 11/4/2022 0636  Last data filed at 11/4/2022 0400  Gross per 24 hour   Intake 2879.64 ml   Output 288 ml   Net 2591.64 ml       Laboratory  Recent Labs     11/03/22  1639 11/03/22  1948 11/04/22  0400   ISTATAPH 6.996* 7.322* 7.495   ISTATAPCO2 35.2 28.7 23.2*   ISTATAPO2 >500* 449* 66   ISTATATCO2 10* 16* 19*   [id number] 100* 100* 95   ISTATARTHCO3 8.6* 14.8* 17.9   ISTATARTBE -22* -10* -4   ISTATTEMP 30.2 C 90.9 F 99.1 F   ISTATFIO2 100 100 40   ISTATSPEC Arterial Arterial Arterial   ISTATAPHTC 7.078* 7.382* 7.491   IGYQPNEM1JE >500* 423* 67         Recent Labs     11/03/22  1555 11/03/22  2308 11/04/22  0055 11/04/22  0535   SODIUM 133*  --  139 140   POTASSIUM 2.8*  --  2.7* 4.2   CHLORIDE 99  --  101 103   CO2 7*  --  15* 19*   *  --  134* 129*   CREATININE 7.31*  --  5.57* 5.51*   MAGNESIUM  --  1.6 1.5  --    PHOSPHORUS  --   --  4.0  --    CALCIUM 10.8*  --   8.9 9.0     Recent Labs     11/03/22  1555 11/04/22  0055 11/04/22  0535   ALTSGPT 40  --   --    ASTSGOT 53*  --   --    ALKPHOSPHAT 156*  --   --    TBILIRUBIN 0.5  --   --    GLUCOSE 188* 323* 273*     Recent Labs     11/03/22  1555 11/03/22  2040   WBC 16.0*  --    NEUTSPOLYS 90.40*  --    LYMPHOCYTES 2.60*  --    MONOCYTES 6.20  --    EOSINOPHILS 0.00 1   BASOPHILS 0.10  --    ASTSGOT 53*  --    ALTSGPT 40  --    ALKPHOSPHAT 156*  --    TBILIRUBIN 0.5  --      Recent Labs     11/03/22  1555   RBC 4.70   HEMOGLOBIN 15.3   HEMATOCRIT 41.9   PLATELETCT 197       Imaging  X-Ray:  I have personally reviewed the images and compared with prior images. and My impression is: Unchanged minimal pulmonary edema, endotracheal tube/gastric tube/right IJ central venous catheter and right chest port noted    Assessment/Plan  * Acute metabolic encephalopathy  Assessment & Plan  Presumed to be secondary to drug overdose/narcotics, possible anoxic brain injury  Head CT without contrast  Limit sedatives with close neurologic monitoring  Consider EEG and/or MRI if no improvement soon    Shock (HCC)  Assessment & Plan  Undifferentiated, large volume depletion component  Continue fluid resuscitation  Titrate norepinephrine drip to maintain mean arterial pressure greater than 65  Monitor urine output and vital signs closely for adequacy of resuscitation  Continue empiric stress dose steroids, unable to get a cortisol level at this time given exposure to hydrocortisone    Sepsis (HCC)  Assessment & Plan  This is Septic shock Present on admission -ruled out  Discontinue antibiotics and monitor  Check C. difficile given diarrhea and recent hospitalization    Acute respiratory failure with hypoxia (HCC)  Assessment & Plan  Intubated in ED 11/3  Continue full mechanical ventilatory support, decrease mandatory minute ventilation  Titrate FiO2 to goal SPO2 greater than 92%  RT/O2 protocol  Limit all sedatives, transition from  propofol/fentanyl to dexmedetomidine  ABCDEF bundle    Acute kidney injury (HCC)  Assessment & Plan  Secondary to severe intravascular depletion with ATN component -slowly improving  Status post aggressive fluid resuscitation  Continue maintenance IV fluids with boluses as needed  Assess intravascular status with IVC assessment  Monitor urine output, creatinine, electrolytes closely  Avoid nephrotoxins    Metabolic acidosis  Assessment & Plan  Multifactorial -improving  Discontinue bicarbonate drip  Continue fluid resuscitation with close monitoring    Narcotic overdose, accidental or unintentional, initial encounter ([facility])- (present on admission)  Assessment & Plan  History of drug and alcohol abuse in the past with prior overdose attempts, discussed with family  Continue supportive care  Initial history to be obtained from patient once extubated    Hypothermia associated with environmental change  Assessment & Plan  Status post active rewarming -improved  Continue to monitor temperature    Hypomagnesemia  Assessment & Plan  Replete and monitor closely    Hypokalemia  Assessment & Plan  Continue aggressive repletion and close monitoring  K scale  DNR/DNI per family (sister and 4 children)    VTE:  Heparin  Ulcer: H2 Antagonist  Lines: Central Line  Ongoing indication addressed and Velásquez Catheter  Ongoing indication addressed    I have performed a physical exam and reviewed and updated ROS and Plan today (11/4/2022). In review of yesterday's note (11/3/2022), there are no changes except as documented above.     Patient remains critically ill today requiring active management of her mechanical ventilatory support as well as titration of vasopressor. High risk of deterioration and worsening vital organ dysfunction and death without the above critical care interventions.    Discussed patient condition and risk of morbidity and/or mortality with Family, RN, RT, Pharmacy, Charge nurse / hot rounds, and Patient. Critical  care time = 45 minutes in directly providing and coordinating critical care and extensive data review.  No time overlap and excludes procedures.

## 2022-11-04 NOTE — DIETARY
"Nutrition Support Assessment   Day 1 of admit.  Ashleigh Marquis is a 61 y.o. female with admitting DX of narcotic overdose, accidental or unintentional.     Current problem list:  Narcotic overdose, accidental or unintentional  Acute respiratory failure with hypoxia  Metabolic acidosis  Sepsis  AYDEE  Hypokalemia     Assessment:  Estimated Nutritional Needs: based on:   Height: 167.6 cm (5' 5.98\")  Weight: 75.7 kg (166 lb 14.2 oz) - bed scale  Weight to Use in Calculations: 75.7 kg (166 lb 14.2 oz)  Body mass index is 26.95 kg/m²., BMI classification: overweight    Calculation/Equation: REE per MSJ x 1.2 = 1620 kcals  RMr per PSU (VE: 10.7 L/min and Tmax: 37.4 C) = 1658 kcals  Total Calories / day: 1600 - 1800 (Calories / k - 24)  Total Grams Protein / day: 76 (Grams Protein / k)     Evaluation:   Consult received for TF.   OG tube placed, no KUB scan done yet for enteral access.  Pt admitted for narcotic overdose, pt is intubated on ventilator.   Skin: No edema or assessment noted.  Labs: glucose 273, , Creat 5.51, GFR 8  Meds: pepcid, solucortef, SSI, colace, bowel protocol, precedex IV, fentanyl drip, Levo at 15 mcg, Na bicarb  Last BM:   Renal concentrated/fiber free formula is appropriate to meet estimated needs at this time.     Malnutrition Risk: N/A     Recommendations/Plan:  Start TF Novasource Renal at 25 ml/hr and advance per protocol to goal rate of 35 ml/hr to provide 1680 kcals, 76 gm protein, and 605 ml free water per day.  Fluids per MD.  Monitor renal labs and pressor infusion rates.  Will order metabolic cart study and await results.     RD following.              "

## 2022-11-04 NOTE — PROGRESS NOTES
Tarik from Lab called with critical result of potassium 2.7 at 0156. Critical lab result read back to Tarik. ADRIENNE Sánchez notified.

## 2022-11-04 NOTE — PROGRESS NOTES
Updated daughter Katalina on patients condition. Per daughter, Sister Yanelis will be the person to contact for updates [phone]

## 2022-11-04 NOTE — DISCHARGE PLANNING
Care Transition Team Discharge Planning    Anticipated Discharge Information  Discharge Disposition: Discharged to home/self care (01)    Discharge Plan:  CM rec request to assist with decision making , CM called and spoke with all patients children regarding their rights to make medical decisions for the patient:     Katalina Marquis [phone]  Karen Smiley [phone]   Nils Bahai [phone]  Sj Choudhurylacey [phone]    All children reports they would like to relinquish their rights to any medical decision making and agreeable to allow the patients sister , Yanelis Carter [phone] , to be the medical decision maker.     Provider informed     Angle TORRESN, RN [facility]   Care Coordinator

## 2022-11-04 NOTE — HOSPITAL COURSE
"\"Ashleigh Marquis is a 61 y.o. female with hx of breast CA, chronic pain (on oxycodone 10 Q4H) who was found unresponsive at home by , and reportedly cold and cyanotic on scene. EMS was called and pt was brought to ED on 15L NRB, SpO2 80%. SBP in 110s/70, HR in 190s. Hypothermic at 93F. Narcan 2mg IV x1 given and pt showed some response and there was a concern that patient had opioid overdose. Pt was subsequently intubated at ED, central line placed. Pt later became hypotensive, started on NE gtt. Labs showed creatinine 7.31, K 2.8, sodium 133. Lactate 3.1. WBC 16K. UDS negative except oxycdone. Acetaminophen, alcohol and salicylates are all negatives. UA no pyuria. Pt looks very dry\" - Dr. Blake 11/3    11/4 - CT with SAH, levophed gtt, precedex gtt  11/5 - MRA without aneurysm nor infarct, extubated  "

## 2022-11-04 NOTE — PROGRESS NOTES
Timeout at 2020 for bronchoscopy with Dr. Blake and RT at bedside. No medications given per MD. Ended at 2040. Sample sent to lab.

## 2022-11-04 NOTE — CARE PLAN
VD 2. ETT 7.5 @ 23.  /+8/40%.  Pt was on spont for 4 hours.    Problem: Ventilation  Goal: Ability to achieve and maintain unassisted ventilation or tolerate decreased levels of ventilator support  Description: Target End Date:  4 days     Document on Vent flowsheet    1.  Support and monitor invasive and noninvasive mechanical ventilation  2.  Monitor ventilator weaning response  3.  Perform ventilator associated pneumonia prevention interventions  4.  Manage ventilation therapy by monitoring diagnostic test results  Outcome: Progressing

## 2022-11-05 PROBLEM — I60.9 SUBARACHNOID HEMORRHAGE (HCC): Status: ACTIVE | Noted: 2022-01-01

## 2022-11-05 PROBLEM — E83.39 HYPOPHOSPHATEMIA: Status: ACTIVE | Noted: 2022-01-01

## 2022-11-05 PROBLEM — J69.0 ASPIRATION PNEUMONIA (HCC): Status: ACTIVE | Noted: 2022-01-01

## 2022-11-05 PROBLEM — R19.7 DIARRHEA: Status: ACTIVE | Noted: 2022-01-01

## 2022-11-05 PROBLEM — D64.9 ANEMIA: Status: ACTIVE | Noted: 2022-01-01

## 2022-11-05 NOTE — PROGRESS NOTES
Spoke to CT [employer] regarding pt order for CT angiogram. Tech requested that Dr. Schmidt call radiologist to confirm the order for CT angiogram with current GFR. Dr. Schmidt reported she attempted to call but was unable to be connected to a radiologist. ADRIENNE Sánchez spoke with family to obtain consent. Attempted to reschedule CTA appointment with patient consent, but unable to schedule due to radiologist and Dr. Schmidt not being in touch. CT tech updated to call ADRIENNE Sánchez with a radiologist to complete CT angiogram appointment.

## 2022-11-05 NOTE — PROGRESS NOTES
"Critical Care Progress Note    Date of admission  11/3/2022    Chief Complaint  61 y.o. female admitted 11/3/2022 with AMS, resp failure    Hospital Course  \"Ashleigh Marquis is a 61 y.o. female with hx of breast CA, chronic pain (on oxycodone 10 Q4H) who was found unresponsive at home by , and reportedly cold and cyanotic on scene. EMS was called and pt was brought to ED on 15L NRB, SpO2 80%. SBP in 110s/70, HR in 190s. Hypothermic at 93F. Narcan 2mg IV x1 given and pt showed some response and there was a concern that patient had opioid overdose. Pt was subsequently intubated at ED, central line placed. Pt later became hypotensive, started on NE gtt. Labs showed creatinine 7.31, K 2.8, sodium 133. Lactate 3.1. WBC 16K. UDS negative except oxycdone. Acetaminophen, alcohol and salicylates are all negatives. UA no pyuria. Pt looks very dry\" - Dr. Blake 11/3    11/4 - CT with SAH, levophed gtt, precedex gtt    Interval Problem Update  Reviewed last 24 hour events:   - CT head with B frontal SAH, MRA pending   - AF, decreasing WBC   - NSR, -140   - weaned off NE gtt   - precedex gtt off this morning   - follows commands, Conor, agitated   - vomiting this morning   - Hgb down to 11, plts down to 147   - low K, phos   - slowly improving AYDEE   - glucose remains elevated in the low 200s --> increase aggressiveness of ISS   - improving UOP after fluid bolus   - VD # 3, SBT with NIF -28, FVC 0.8, RSB 60   - day 2/5 unasyn for MSSA PNA   - c diff pending   - hydrocortisone    Review of Systems  Review of Systems   Unable to perform ROS: Intubated      Vital Signs for last 24 hours   Pulse:  [] 71  Resp:  [0-29] 15  BP: ()/(50-87) 127/83  SpO2:  [97 %-100 %] 99 %    Respiratory Information for the last 24 hours  Vent Mode: ASV  PEEP/CPAP: 8  P Support: 5  MAP: 10  Control VTE (exp VT): 353    Physical Exam   Physical Exam  Vitals and nursing note reviewed.   Constitutional:       General: She " is not in acute distress.     Appearance: Normal appearance. She is well-developed and overweight. She is not toxic-appearing.      Interventions: She is sedated, intubated and restrained.      Comments: Gets agitated with sedation wean and gags on ET tube   HENT:      Head: Normocephalic and atraumatic.      Nose: Nose normal. No congestion.      Comments: Nasogastric tube in place     Mouth/Throat:      Mouth: Mucous membranes are moist.      Pharynx: Oropharynx is clear.      Comments: Endotracheal tube in place  Eyes:      General: No scleral icterus.     Conjunctiva/sclera: Conjunctivae normal.      Pupils: Pupils are equal, round, and reactive to light.   Neck:      Vascular: No JVD.      Trachea: No tracheal deviation.      Comments: No meningismus, right IJ central venous catheter without surrounding hematoma  Cardiovascular:      Rate and Rhythm: Normal rate and regular rhythm. Occasional Extrasystoles are present.     Pulses: No decreased pulses.      Heart sounds: Normal heart sounds. No murmur heard.     Comments: Right chest port noted  Pulmonary:      Effort: Pulmonary effort is normal. No accessory muscle usage. She is intubated.      Breath sounds: Examination of the left-lower field reveals rhonchi. Rhonchi present. No wheezing or rales.      Comments: Good compliance, continues tolerating spontaneous breathing trial with adequate weaning parameters  Abdominal:      General: Bowel sounds are normal. There is no distension.      Palpations: Abdomen is soft.      Tenderness: There is no abdominal tenderness. There is no guarding or rebound.   Genitourinary:     Comments: Velásquez catheter in place  Musculoskeletal:         General: No tenderness.      Cervical back: Neck supple. No tenderness.      Right lower leg: No edema.      Left lower leg: No edema.   Skin:     General: Skin is warm and dry.      Capillary Refill: Capillary refill takes 2 to 3 seconds.      Coloration: Skin is not pale.    Neurological:      Mental Status: She is alert.      Cranial Nerves: No cranial nerve deficit.      Sensory: No sensory deficit.      Comments: Awakens and gets agitated but follows commands and moves all extremities purposefully without focal deficit   Psychiatric:         Behavior: Behavior is cooperative.       Medications  Current Facility-Administered Medications   Medication Dose Route Frequency Provider Last Rate Last Admin    potassium chloride in water (KCL) ivpb **Administer in ICU only** 20 mEq  20 mEq Intravenous Once Sharyn Sánchez        insulin regular (HumuLIN R,NovoLIN R) injection  1-6 Units Subcutaneous Q6HRS Jeremy M Gonda, M.D.   1 Units at 11/05/22 0532    And    dextrose 10 % BOLUS 25 g  25 g Intravenous Q15 MIN PRN Jeremy M Gonda, M.D.        Pharmacy Consult: Enteral tube insertion - review meds/change route/product selection  1 Each Other PHARMACY TO DOSE Jeremy M Gonda, M.D.        K+ Scale: Goal of 4.5  1 Each Intravenous Q6HRS Jeremy M Gonda, M.D.   1 Each at 11/05/22 0600    dexmedetomidine (PRECEDEX) 400 mcg/100mL NS premix infusion  0.1-1.5 mcg/kg/hr Intravenous Continuous Jeremy M Gonda, M.D.   Stopped at 11/05/22 0559    famotidine (PEPCID) tablet 20 mg  20 mg Enteral Tube DAILY Jeremy M Gonda, M.D.   20 mg at 11/05/22 0522    Or    famotidine (PEPCID) injection 20 mg  20 mg Intravenous DAILY Jeremy M Gonda, M.D.        ampicillin/sulbactam (UNASYN) 3 g in  mL IVPB  3 g Intravenous Q EVENING Jeremy M Gonda, M.D.   Stopped at 11/04/22 1756    Pharmacy Consult Request  1 Each Other PHARMACY TO DOSE Jeremy M Gonda, M.D.        norepinephrine (Levophed) 8 mg in 250 mL NS infusion (premix)  0-30 mcg/min Intravenous Continuous Jeremy M Gonda, M.D.   Stopped at 11/05/22 0414    Respiratory Therapy Consult   Nebulization Continuous RT Kaiden Blake D.O.        lidocaine (XYLOCAINE) 1 % injection 2 mL  2 mL Tracheal Tube Q30 MIN PRN Kaiden Blake D.O.        fentaNYL (SUBLIMAZE)  injection 100 mcg  100 mcg Intravenous Q15 MIN PRN MARLINE Baca.O.   100 mcg at 11/04/22 0328    And    fentaNYL (SUBLIMAZE) injection 200 mcg  200 mcg Intravenous Q15 MIN PRN MARLINE Baca.O.        And    fentaNYL (SUBLIMAZE) 50 mcg/mL in 50mL (Continuous Infusion)   Intravenous Continuous MARGE BacaO.   Stopped at 11/04/22 0911    hydrocortisone sodium succinate PF (Solu-CORTEF) 100 MG injection 50 mg  50 mg Intravenous Q6HRS MARLINE Baca.O.   50 mg at 11/05/22 0523       Fluids    Intake/Output Summary (Last 24 hours) at 11/5/2022 0641  Last data filed at 11/5/2022 0600  Gross per 24 hour   Intake 3947.51 ml   Output 578 ml   Net 3369.51 ml         Laboratory  Recent Labs     11/03/22  1948 11/04/22  0115 11/04/22  0400 11/04/22  0748 11/05/22  0413   ISTATAPH 7.322*  --  7.495  --  7.421   ISTATAPCO2 28.7  --  23.2*  --  27.7   ISTATAPO2 449*  --  66  --  129*   ISTATATCO2 16*  --  19*  --  19*   IPKGWSE0KFM 100*  --  95  --  99   ISTATARTHCO3 14.8*  --  17.9  --  18.0   ISTATARTBE -10*  --  -4  --  -5*   ISTATTEMP 90.9 F   < > 99.1 F 98.4 F 97.2 F   ISTATFIO2 100   < > 40 40 30   ISTATSPEC Arterial   < > Arterial Venous Arterial   ISTATAPHTC 7.382*  --  7.491  --  7.432   [id number] 423*  --  67  --  124*    < > = values in this interval not displayed.           Recent Labs     11/03/22  2308 11/04/22  0055 11/04/22  0535 11/05/22  0040 11/05/22  0415 11/05/22  0530   SODIUM  --  139   < > 142 144 144   POTASSIUM  --  2.7*   < > 3.9 3.8 3.7   CHLORIDE  --  101   < > 106 107 109   CO2  --  15*   < > 17* 18* 17*   BUN  --  134*   < > 121* 123* 122*   CREATININE  --  5.57*   < > 5.02* 4.93* 4.88*   MAGNESIUM 1.6 1.5  --   --  2.8*  --    PHOSPHORUS  --  4.0  --   --  1.9*  --    CALCIUM  --  8.9   < > 9.0 9.0 9.0    < > = values in this interval not displayed.       Recent Labs     11/03/22  1555 11/04/22  0055 11/05/22  0040 11/05/22  0415 11/05/22  0530   ALTSGPT 40  --   --   --   --    AISSATOU 53*  --    --   --   --    ALKPHOSPHAT 156*  --   --   --   --    TBILIRUBIN 0.5  --   --   --   --    PREALBUMIN  --   --   --  16.6*  --    GLUCOSE 188*   < > 212* 184* 200*    < > = values in this interval not displayed.       Recent Labs     11/03/22  1555 11/03/22  2040 11/04/22  0535 11/05/22  0415   WBC 16.0*  --  25.1* 17.9*   NEUTSPOLYS 90.40*  --  89.60* 89.40*   LYMPHOCYTES 2.60*  --  1.70* 2.90*   MONOCYTES 6.20  --  7.60 6.70   EOSINOPHILS 0.00 1 0.00 0.00   BASOPHILS 0.10  --  0.20 0.10   ASTSGOT 53*  --   --   --    ALTSGPT 40  --   --   --    ALKPHOSPHAT 156*  --   --   --    TBILIRUBIN 0.5  --   --   --        Recent Labs     11/03/22  1555 11/04/22 0535 11/05/22  0415   RBC 4.70 4.20 3.64*   HEMOGLOBIN 15.3 13.0 11.6*   HEMATOCRIT 41.9 36.0* 32.6*   PLATELETCT 197 232 147*         Imaging  X-Ray:  I have personally reviewed the images and compared with prior images. and My impression is: no acute CP, tubes and lines and in good position, port  CT:    Reviewed    Assessment/Plan  * Acute metabolic encephalopathy  Assessment & Plan  Presumed to be secondary to drug overdose/narcotics, possible anoxic brain injury, also found to have bifrontal small SAH (likely traumatic) -improving clinically  MRA head and neck today to evaluate for aneurysm  Limit sedatives with close neurologic monitoring (dexmedetomidine drip with as needed fentanyl)    Subarachnoid hemorrhage (HCC)  Assessment & Plan  Presumed to be secondary to fall/trauma, doubt aneurysmal  MRA head and neck  Tight blood pressure control with goal SBP less than 140  Avoid anticoagulation, SCDs only  Close neurologic monitoring  Hold off on nimodipine unless aneurysm found    Shock (HCC)  Assessment & Plan  Undifferentiated, large volume depletion component -improving  Continue maintenance IV fluids  Discontinue norepinephrine  Monitor urine output and vital signs closely for adequacy of resuscitation  Continue empiric stress dose steroids x3  days    Sepsis (HCC)  Assessment & Plan  This is Septic shock Present on admission -ruled out, SIRS explained by other etiology    Acute respiratory failure with hypoxia (HCC)  Assessment & Plan  Intubated in ED 11/3  Continue full mechanical ventilatory support, no change to ASV support  Titrate FiO2 to goal SPO2 greater than 92%  RT/O2 protocol  Limit all sedatives, dexmedetomidine/as needed fentanyl  ABCDEF bundle, SBT with hopeful extubation in the next 12 to 24 hours    Aspiration pneumonia (HCC)  Assessment & Plan  With MSSA  Continue Unasyn x5 days    Acute kidney injury (HCC)  Assessment & Plan  Secondary to severe intravascular depletion with ATN component - slowly improving again today, non-oliguric  Status post aggressive fluid resuscitation  Continue maintenance IV fluids (normal saline) with boluses as needed  Monitor urine output, creatinine, electrolytes closely  Avoid nephrotoxins    Metabolic acidosis  Assessment & Plan  Multifactorial -improving  Continue fluid resuscitation with close monitoring    Narcotic overdose, accidental or unintentional, initial encounter ([facility])- (present on admission)  Assessment & Plan  History of drug and alcohol abuse in the past with prior overdose attempts, discussed with family  Continue supportive care  Initial history to be obtained from patient once extubated    Anemia  Assessment & Plan  Daily CBC with conservative transfusion strategy    Diarrhea  Assessment & Plan  Positive risk factors for C. difficile given recent hospitalization and antibiotic use  C. difficile PCR pending  Continue isolation precautions    Hypophosphatemia  Assessment & Plan  Replete and monitor closely    Hypothermia associated with environmental change  Assessment & Plan  Status post active rewarming -improved  Continue to monitor temperature    Hypomagnesemia  Assessment & Plan  Repleted    Hypokalemia  Assessment & Plan  Replete and monitor closely  DNR/DNI per family (sister and 4  children)    VTE:  Contraindicated  Ulcer: H2 Antagonist  Lines: Central Line  Ongoing indication addressed and Velásquez Catheter  Ongoing indication addressed    I have performed a physical exam and reviewed and updated ROS and Plan today (11/5/2022). In review of yesterday's note (11/4/2022), there are no changes except as documented above.     Patient is critically ill today requiring active management of her mechanical ventilatory support as well as close neurologic monitoring and blood pressure management. High risk of deterioration and worsening vital organ dysfunction and death without the above critical care interventions.    Discussed patient condition and risk of morbidity and/or mortality with Family, RN, RT, Pharmacy, Charge nurse / hot rounds, and Patient. Critical care time = 38 minutes in directly providing and coordinating critical care and extensive data review.  No time overlap and excludes procedures.    Please note that this dictation was created using voice recognition software. I have made every reasonable attempt to correct obvious errors, but there may be errors of grammar and possibly content that I did not discover before finalizing the note.

## 2022-11-05 NOTE — CARE PLAN
Problem: Ventilation  Goal: Ability to achieve and maintain unassisted ventilation or tolerate decreased levels of ventilator support  Description: Target End Date:  4 days     Document on Vent flowsheet    1.  Support and monitor invasive and noninvasive mechanical ventilation  2.  Monitor ventilator weaning response  3.  Perform ventilator associated pneumonia prevention interventions  4.  Manage ventilation therapy by monitoring diagnostic test results  Outcome: Progressing      Ventilator Daily Summary    Vent Day # 4    Ventilator settings:  / +8/ 30%    Weaning trials:    Respiratory Procedures:    Plan: Continue current ventilator settings and wean mechanical ventilation as tolerated per physician orders.    Vent Mode: ASV  Rate (breaths/min):  [16] 16  PEEP/CPAP:  [8] 8  PIP:  [15-18] 15  MAP:  [10-11] 10

## 2022-11-05 NOTE — ASSESSMENT & PLAN NOTE
Traumatic, nonaneurysmal subarachnoid hemorrhage  Continue strict blood pressure control with goal SBP less than 140  Avoid anticoagulation, SCDs only  Close neurologic monitoring  PT/OT/SLP eval

## 2022-11-05 NOTE — PROGRESS NOTES
Blood pressure monitor in patient room malfunctioning, attempting to troubleshoot. Patient has strong radial and pedal pulses. Patient BP unmonitored from 1830 to 1900.

## 2022-11-05 NOTE — PROGRESS NOTES
Pt seen for placement of BMS. MD order verified. Pt assessed, noted to be incontinent of loose brown/green stool. Sacrum/buttocks red. Sphincter tone determined to be adequate. BMS placed without difficulty, 45 mL of tap water placed in retention balloon, irrigated with 60 mL warm tap water. Nursing to irrigate q shift with 60 mL-120 mL warm tap water or until patent.

## 2022-11-05 NOTE — PROGRESS NOTES
I spoke with the patient's sister Yanelis Carter [phone] regarding the CT head results showing bifrontal subarachnoid hemorrhages. I discussed the treatment plan of obtaining a Cta head to rule out aneurysm to further guide care as ordered by Dr. Schmidt. Ms. Carter is designated surrogate decision maker for this patient and she agrees to the risks of the CT w/ contrast including worsening renal function which may require hemodialysis as the patient's GFR is 9 at this time.     Patient was critically ill during the time I treated the patient.    25 minutes of critical care time were spent, including examination and interview of the patient, explanation of prognosis/diagnosis/plan of care, direction of nursing staff and discussion of the patient with other physicians involved.  This time was independent of procedures performed.    Greater than 50% of which was spent at the bedside.

## 2022-11-05 NOTE — CARE PLAN
Pt extubated and on room air.    Problem: Ventilation  Goal: Ability to achieve and maintain unassisted ventilation or tolerate decreased levels of ventilator support  Description: Target End Date:  4 days     Document on Vent flowsheet    1.  Support and monitor invasive and noninvasive mechanical ventilation  2.  Monitor ventilator weaning response  3.  Perform ventilator associated pneumonia prevention interventions  4.  Manage ventilation therapy by monitoring diagnostic test results  Outcome: Met

## 2022-11-05 NOTE — RESPIRATORY CARE
Pt extubated at 1330 and is on room air. No respiratory distress noted. No stridor present at this time. Breath sounds heard bilaterally.

## 2022-11-05 NOTE — PROGRESS NOTES
Dr. Schmidt reportedly attempted to contact the radiologist earlier this evening to proceed with obtaining a CTA head on this patient but for unknown reasons she was unable to contact the radiologist. I received a message via Voalte to contact radiologist Dr. Henson at extension [phone]. I called the number and asked to be connected with him to discuss the plan of care. The first automated message stated that the person at that extension lost wireless service, connected was re-attempted and I was unable to be connected. Therefore, I have requested the radiologist contact me since they have been unreachable.     Dr. Henson reached out to me. We discussed the risks/benefits of the CTa head to r/o aneurysm as the cause of bilateral frontal SAH. As he stated, the SAH is not likely a pattern associated with aneurysmal bleeding. He recommended an MRA-non contrasted image which would show if there is a large aneurysm without further injuring the renal function. For now, we will defer to day team to determine the plan of care and imaging to be done. I also updated my attending Dr. Lewis of the plan to defer imaging.      15 minutes of critical care time were spent, including examination and interview of the patient, explanation of prognosis/diagnosis/plan of care, direction of nursing staff and discussion of the patient with other physicians involved.  This time was independent of procedures performed.    Greater than 50% of which was spent at the bedside.

## 2022-11-06 PROBLEM — D69.6 THROMBOCYTOPENIA (HCC): Status: ACTIVE | Noted: 2022-01-01

## 2022-11-06 PROBLEM — I10 PRIMARY HYPERTENSION: Status: ACTIVE | Noted: 2022-01-01

## 2022-11-06 PROBLEM — E87.0 HYPERNATREMIA: Status: ACTIVE | Noted: 2022-01-01

## 2022-11-06 NOTE — ASSESSMENT & PLAN NOTE
Extubated 11/5  Previously completed antibiotic therapy for MSSA pneumonia  And aspiration pneumonia    Currently resolved

## 2022-11-06 NOTE — ASSESSMENT & PLAN NOTE
Possibly due to oxycodone overdose and SAH    EEG consistent with encephalopathy no seizures noted  Ammonia normal  MRI trace bilateral subarachnoid blood and possible right frontal lobe lesion per radiologist suspected to be artifact secondary to motion but cannot rule out metastatic lesion    Avoid sedating agents frequent orientation  Will need repeat MRI with contrast as outpatient

## 2022-11-06 NOTE — CARE PLAN
The patient is Watcher - Medium risk of patient condition declining or worsening    Shift Goals  Clinical Goals: MRI, Extubate, un-restrain, swallow evaluation  Patient Goals: SHAI  Family Goals: SHAI    Progress made toward(s) clinical / shift goals:  VS stable, MRI complete. Patient extubated to room air and unrestrained. Patient oriented to self but redirectable. TF held for episode of emesis. Multiple loose stools, BMS placed by Estela IRENE per order. Attempted swallow evaluation, patient coughs with sips of water. Patient self removed NGT at 1845.

## 2022-11-06 NOTE — ASSESSMENT & PLAN NOTE
C diff was neg  abd exam benign  Imodium PRN  Reviewed prior records under different MRN patient hospitalized at [facility] evaluated by GI Dr. Pate with EGD and colonoscopy pathology with lymphocytic infiltration felt to be secondary to her breast cancer therapy    Continue Imodium and  cholestyramine  Clinically improved start tapering prednisone outpatient follow-up with GI consultants

## 2022-11-06 NOTE — PROGRESS NOTES
"Critical Care Progress Note    Date of admission  11/3/2022    Chief Complaint  61 y.o. female admitted 11/3/2022 with AMS, resp failure    Hospital Course  \"Ashleigh Marquis is a 61 y.o. female with hx of breast CA, chronic pain (on oxycodone 10 Q4H) who was found unresponsive at home by , and reportedly cold and cyanotic on scene. EMS was called and pt was brought to ED on 15L NRB, SpO2 80%. SBP in 110s/70, HR in 190s. Hypothermic at 93F. Narcan 2mg IV x1 given and pt showed some response and there was a concern that patient had opioid overdose. Pt was subsequently intubated at ED, central line placed. Pt later became hypotensive, started on NE gtt. Labs showed creatinine 7.31, K 2.8, sodium 133. Lactate 3.1. WBC 16K. UDS negative except oxycdone. Acetaminophen, alcohol and salicylates are all negatives. UA no pyuria. Pt looks very dry\" - Dr. Blake 11/3    11/4 - CT with SAH, levophed gtt, precedex gtt  11/5 - MRA without aneurysm nor infarct, extubated    Interval Problem Update  Reviewed last 24 hour events:   - extubated without complications   - AF, WBC unchanged   - c diff neg   - plts down to 122   - Hgb down to 10   - speaking, yelling out at times, odd affect but no focal deficits   - sinus tach, -150   - weaned off of levophed 11/5   - Na up to 152   - low K   - improving AYDEE   - unasyn for MSSA PNA    Review of Systems  Review of Systems   Constitutional:  Positive for malaise/fatigue. Negative for chills and fever.   HENT:  Negative for congestion and sore throat.    Eyes:  Negative for blurred vision.   Respiratory:  Positive for cough. Negative for sputum production and shortness of breath.    Cardiovascular:  Negative for chest pain, palpitations and leg swelling.   Gastrointestinal:  Positive for diarrhea. Negative for abdominal pain, nausea and vomiting.   Genitourinary:  Negative for dysuria.   Musculoskeletal:  Negative for back pain and myalgias.   Skin:  Negative for " rash.   Neurological:  Negative for dizziness, sensory change, speech change and headaches.   Endo/Heme/Allergies:  Does not bruise/bleed easily.   Psychiatric/Behavioral:  Negative for depression and hallucinations. The patient is not nervous/anxious.    All other systems reviewed and are negative.     Vital Signs for last 24 hours   Pulse:  [] 101  Resp:  [12-33] 16  BP: ()/(55-81) 123/66  SpO2:  [93 %-100 %] 98 %    Respiratory Information for the last 24 hours  Vent Mode: ASV  PEEP/CPAP: 8  P Support: 5  MAP: 10 --> extubated 11/5 to room air    Physical Exam   Physical Exam  Vitals and nursing note reviewed.   Constitutional:       General: She is awake. She is not in acute distress.     Appearance: Normal appearance. She is well-developed and overweight.   HENT:      Head: Normocephalic and atraumatic.      Nose: Nose normal. No congestion.      Comments:        Mouth/Throat:      Mouth: Mucous membranes are moist.      Pharynx: Oropharynx is clear.   Eyes:      General: No scleral icterus.     Extraocular Movements: Extraocular movements intact.      Conjunctiva/sclera: Conjunctivae normal.      Pupils: Pupils are equal, round, and reactive to light.   Neck:      Vascular: No JVD.      Trachea: No tracheal deviation.      Comments: Right IJ central venous catheter without surrounding hematoma  Cardiovascular:      Rate and Rhythm: Regular rhythm. Tachycardia present. Occasional Extrasystoles are present.     Pulses: Normal pulses. No decreased pulses.      Heart sounds: Normal heart sounds. No murmur heard.     Comments: Right chest port noted  Pulmonary:      Effort: Pulmonary effort is normal. No tachypnea or accessory muscle usage.      Breath sounds: Examination of the left-lower field reveals rhonchi. Rhonchi present. No wheezing or rales.      Comments: Speaking in full sentences without difficulty  Abdominal:      General: Bowel sounds are normal. There is no distension.      Palpations:  Abdomen is soft.      Tenderness: There is no abdominal tenderness. There is no guarding or rebound.   Genitourinary:     Comments: Velásquez catheter in place, rectal tube in place  Musculoskeletal:         General: No tenderness.      Cervical back: Neck supple. No tenderness.      Right lower leg: No edema.      Left lower leg: No edema.   Skin:     General: Skin is warm and dry.      Capillary Refill: Capillary refill takes less than 2 seconds.      Coloration: Skin is not pale.   Neurological:      General: No focal deficit present.      Mental Status: She is alert. She is disoriented.      Cranial Nerves: No cranial nerve deficit.      Sensory: No sensory deficit.      Motor: No weakness.   Psychiatric:         Behavior: Behavior is cooperative.      Comments: Odd affect and behavior       Medications  Current Facility-Administered Medications   Medication Dose Route Frequency Provider Last Rate Last Admin    potassium chloride (KCL) ivpb 10 mEq  10 mEq Intravenous Once Jeremy M Gonda, M.D. 100 mL/hr at 11/06/22 0612 10 mEq at 11/06/22 0612    ondansetron (ZOFRAN) syringe/vial injection 4 mg  4 mg Intravenous Q4HRS PRN Jeremy M Gonda, M.D.   4 mg at 11/05/22 0758    insulin regular (HumuLIN R,NovoLIN R) injection  2-9 Units Subcutaneous Q6HRS Jeremy M Gonda, M.D.        And    dextrose 10 % BOLUS 25 g  25 g Intravenous Q15 MIN PRN Jeremy M Gonda, M.D.        NS infusion   Intravenous Continuous Jeremy M Gonda, M.D. 83 mL/hr at 11/05/22 1331 New Bag at 11/05/22 1331    Pharmacy Consult: Enteral tube insertion - review meds/change route/product selection  1 Each Other PHARMACY TO DOSE Jeremy M Gonda, M.D.        K+ Scale: Goal of 4.5  1 Each Intravenous Q6HRS Jeremy M Gonda, M.D.   1 Each at 11/06/22 0600    ampicillin/sulbactam (UNASYN) 3 g in  mL IVPB  3 g Intravenous Q EVENING Jeremy M Gonda, M.D.   Stopped at 11/05/22 1843    Respiratory Therapy Consult   Nebulization Continuous RT Kaiden Blake D.O.         hydrocortisone sodium succinate PF (Solu-CORTEF) 100 MG injection 50 mg  50 mg Intravenous Q6HRS Jeremy M Gonda, M.D.   50 mg at 11/06/22 0521       Fluids    Intake/Output Summary (Last 24 hours) at 11/6/2022 0632  Last data filed at 11/6/2022 0600  Gross per 24 hour   Intake 1936.82 ml   Output 1895 ml   Net 41.82 ml         Laboratory  Recent Labs     11/03/22  1948 11/04/22  0115 11/04/22  0400 11/04/22  0748 11/05/22  0413   ISTATAPH 7.322*  --  7.495  --  7.421   ISTATAPCO2 28.7  --  23.2*  --  27.7   ISTATAPO2 449*  --  66  --  129*   ISTATATCO2 16*  --  19*  --  19*   [id number] 100*  --  95  --  99   ISTATARTHCO3 14.8*  --  17.9  --  18.0   ISTATARTBE -10*  --  -4  --  -5*   ISTATTEMP 90.9 F   < > 99.1 F 98.4 F 97.2 F   ISTATFIO2 100   < > 40 40 30   ISTATSPEC Arterial   < > Arterial Venous Arterial   ISTATAPHTC 7.382*  --  7.491  --  7.432   EQDINYTA2TP 423*  --  67  --  124*    < > = values in this interval not displayed.           Recent Labs     11/04/22  0055 11/04/22  0535 11/05/22  0415 11/05/22  0530 11/05/22  1825 11/05/22  2344 11/06/22  0530   SODIUM 139   < > 144   < > 145 149* 152*   POTASSIUM 2.7*   < > 3.8   < > 3.9 3.8 3.9   CHLORIDE 101   < > 107   < > 112 115* 117*   CO2 15*   < > 18*   < > 17* 20 21   *   < > 123*   < > 112* 108* 100*   CREATININE 5.57*   < > 4.93*   < > 3.72* 3.39* 2.69*   MAGNESIUM 1.5  --  2.8*  --   --   --  2.1   PHOSPHORUS 4.0  --  1.9*  --   --   --  2.8   CALCIUM 8.9   < > 9.0   < > 8.9 9.2 9.2    < > = values in this interval not displayed.       Recent Labs     11/03/22  1555 11/04/22  0055 11/05/22  0415 11/05/22  0530 11/05/22  1825 11/05/22  2344 11/06/22 0530   ALTSGPT 40  --   --   --   --   --   --    ASTSGOT 53*  --   --   --   --   --   --    ALKPHOSPHAT 156*  --   --   --   --   --   --    TBILIRUBIN 0.5  --   --   --   --   --   --    PREALBUMIN  --   --  16.6*  --   --   --   --    GLUCOSE 188*   < > 184*   < > 141* 128* 131*    < > = values  in this interval not displayed.       Recent Labs     11/03/22  1555 11/03/22  2040 11/04/22  0535 11/05/22 0415 11/06/22  0530   WBC 16.0*  --  25.1* 17.9* 17.6*   NEUTSPOLYS 90.40*  --  89.60* 89.40* 87.70*   LYMPHOCYTES 2.60*  --  1.70* 2.90* 4.40*   MONOCYTES 6.20  --  7.60 6.70 6.50   EOSINOPHILS 0.00   < > 0.00 0.00 0.00   BASOPHILS 0.10  --  0.20 0.10 0.10   ASTSGOT 53*  --   --   --   --    ALTSGPT 40  --   --   --   --    ALKPHOSPHAT 156*  --   --   --   --    TBILIRUBIN 0.5  --   --   --   --     < > = values in this interval not displayed.       Recent Labs     11/04/22  0535 11/05/22 0415 11/06/22  0530   RBC 4.20 3.64* 3.20*   HEMOGLOBIN 13.0 11.6* 10.4*   HEMATOCRIT 36.0* 32.6* 29.6*   PLATELETCT 232 147* 122*         Imaging  X-Ray:  I have personally reviewed the images and compared with prior images. and No film today  MRI:   Reviewed    Assessment/Plan  * Acute metabolic encephalopathy  Assessment & Plan  Secondary to drug overdose/narcotics, possible anoxic brain injury, also found to have bifrontal small SAH (likely traumatic) - improving clinically  Limit sedatives with close neurologic monitoring  SLP for cognitive eval    Subarachnoid hemorrhage (HCC)  Assessment & Plan  Traumatic, nonaneurysmal subarachnoid hemorrhage  Continue strict blood pressure control with goal SBP less than 140  Avoid anticoagulation, SCDs only  Close neurologic monitoring  PT/OT/SLP eval    Shock (HCC)  Assessment & Plan  Undifferentiated, large volume depletion component -improved, weaned off Levophed 11/5  Discontinue IV fluids once able to tolerate oral hydration  S/p empiric stress dose steroids x3 days (completes today)    Sepsis (HCC)  Assessment & Plan  This is Septic shock Present on admission -ruled out, SIRS explained by other etiology    Acute respiratory failure with hypoxia (HCC)  Assessment & Plan  Intubated in ED 11/3-11/5  Encourage IS, mobilization  Limit sedatives  RT/O2 protocols    Aspiration  pneumonia (HCC)  Assessment & Plan  With MSSA  Continue Unasyn x5 days    Acute kidney injury (HCC)  Assessment & Plan  Secondary to severe intravascular depletion with ATN component - slowly improving daily, non-oliguric  Status post aggressive fluid resuscitation  D/c MIVFs when able to tolerate oral hydration  Monitor urine output, creatinine, electrolytes closely  Avoid nephrotoxins    Metabolic acidosis  Assessment & Plan  Multifactorial -improved    Narcotic overdose, accidental or unintentional, initial encounter ([facility])- (present on admission)  Assessment & Plan  History of drug and alcohol abuse in the past with prior overdose attempts, discussed with family  Continue supportive care  Additional history to be obtained from patient once able    Thrombocytopenia (HCC)  Assessment & Plan  Mild, no signs of bleeding  Monitor    Anemia  Assessment & Plan  Slightly worse without signs of bleeding  Daily CBC with conservative transfusion strategy    Diarrhea  Assessment & Plan  C. difficile PCR negative  Imodium prn    Hypophosphatemia  Assessment & Plan  Repleted    Hypothermia associated with environmental change  Assessment & Plan  Status post active rewarming - improved    Hypomagnesemia  Assessment & Plan  Repleted    Hypokalemia  Assessment & Plan  Replete again today and monitor  DNR/DNI per family (sister and 4 children)    VTE:  Contraindicated  Ulcer: Not Indicated  Lines: Central Line  to be removed today and Velásquez Catheter  to be removed today    I have performed a physical exam and reviewed and updated ROS and Plan today (11/6/2022). In review of yesterday's note (11/5/2022), there are no changes except as documented above.     Discussed patient condition and risk of morbidity and/or mortality with Hospitalist, RN, RT, Pharmacy, Charge nurse / hot rounds, and Patient. Critical care services will sign off at this time.  Please call with any questions or concerns.    Please note that this dictation was  created using voice recognition software. I have made every reasonable attempt to correct obvious errors, but there may be errors of grammar and possibly content that I did not discover before finalizing the note.

## 2022-11-06 NOTE — PROGRESS NOTES
Notified Dr. Horowitz via voalte of patient's trend in increasing blood pressure (most recent 175/90) and requested a PRN medication to maintain appropriate SBP.

## 2022-11-06 NOTE — CONSULTS
Hospital Medicine Consultation    Date of Service  11/6/2022    Referring Physician  Jeremy M Gonda, M.D.    Consulting Physician  Jorje Horowitz M.D.    Reason for Consultation  ICU transfer    History of Presenting Illness  61 y.o. female who presented 11/3/2022 with prior breast cancer, chronic pain on oxycodone who was found unresponsive by . She was brought in on 15L NRB, tachycardic 190s, hypothermic 93F. She was given narcan and had some response. She was found with AYDEE, hypokalemia 2.8, metabolic acidosis pH 7.0, and sepsis. She was intubated, placed on bicarb drip, started on pressor, steroid, with zosyn and linezolid. CTH showed bifrontal subarachnoid hemorrhages. MRA head and neck was negative for aneurysm. SAH likely due to trauma. She was extubated 11/5 and weaned off pressor.   WBC is 17.6. UA had few WBCs, C Diff neg. Blood cultures neg. Sputum grew MSSA. Continue unasyn.  Hgb 10.4, decreased from 11.6 yesterday  Plt 122, down from 147 yesterday  Hypernatremic at 152. Increase FWF form 30cc to 100cc q4h  Creatinine improving  Nurse is trying to replace feeding tube  She is alert, answering questions and following commands but confused. When asked what hospital this is, she said 2014. When asked is she took too much oxycodone, she denies, She denies feeling depression, suicidal, or tried to overdose. She also denies ever overdosing before.  Family is bedside, patient lives alone and they prefer SNF vs rehab. PTOT is pending    Review of Systems  Review of Systems   Unable to perform ROS: Medical condition (confused)   Respiratory:  Negative for shortness of breath.    Cardiovascular:  Negative for chest pain.   Gastrointestinal:  Negative for abdominal pain and nausea.     Past Medical History  Breast cancer, chronic pain on oxycodone    Surgical History   has no past surgical history on file.    Family History  family history is not on file.    Social History       Medications  Prior to  Admission Medications   Prescriptions Last Dose Informant Patient Reported? Taking?   HYDROcodone/acetaminophen (NORCO)  MG Tab UNK at K Historical Yes Yes   Sig: Take 1-2 Tablets by mouth 4 times a day as needed for Severe Pain.   NALOXONE HCL NA UNK at K Historical Yes Yes   Sig: Administer 1 Dose into affected nostril(S) 1 time a day as needed.   acetaminophen (TYLENOL) 500 MG Tab UNK at K Historical Yes Yes   Sig: Take 1,000 mg by mouth 2 times a day as needed. Indications: Pain   albuterol 108 (90 Base) MCG/ACT Aero Soln inhalation aerosol UNK at [facility] Historical Yes Yes   Sig: Inhale 2 Puffs 1 time a day as needed for Shortness of Breath.   carisoprodol (SOMA) 350 MG Tab UNK at K Historical Yes Yes   Sig: Take 350 mg by mouth every 6 hours as needed for Muscle Spasms.   clonazepam (KLONOPIN) 2 MG tablet UNK at [facility] Historical Yes Yes   Sig: Take 2 mg by mouth 3 times a day as needed. Indications: Feeling Anxious   diphenoxylate-atropine (LOMOTIL) 2.5-0.025 MG Tab UNK at K Historical Yes Yes   Sig: Take 1 Tablet by mouth 4 times a day as needed for Diarrhea.   lisinopril (PRINIVIL) 10 MG Tab UNK at K Historical Yes Yes   Sig: Take 10 mg by mouth every day.   multivitamin (THERAGRAN) Tab UNK at K Historical Yes Yes   Sig: Take 1 Tablet by mouth every day.   omeprazole (PRILOSEC) 40 MG delayed-release capsule UNK at [facility] Historical Yes Yes   Sig: Take 40 mg by mouth every day.   ondansetron (ZOFRAN ODT) 4 MG TABLET DISPERSIBLE UNK at [facility] Historical Yes Yes   Sig: Take 4 mg by mouth every four hours as needed for Nausea.   oxyCODONE immediate release (ROXICODONE) 10 MG immediate release tablet UNK at K Historical Yes Yes   Sig: Take 10 mg by mouth 6 Times a Day.   predniSONE (DELTASONE) 10 MG Tab UNK at K Historical Yes Yes   Sig: Take 10 mg by mouth every day.   prochlorperazine (COMPAZINE) 10 MG Tab UNK at K Historical Yes Yes   Sig: Take 10 mg by mouth every 6 hours as needed for  Nausea/Vomiting.      Facility-Administered Medications: None       Allergies  Allergies   Allergen Reactions    Aspirin Unspecified     Severely sick as a child     Sulfa Drugs Vomiting    Toradol Vomiting       Physical Exam  Pulse:  [] 90  Resp:  [12-40] 15  BP: ()/() 114/58  SpO2:  [95 %-99 %] 95 %    Physical Exam  Vitals and nursing note reviewed.   Constitutional:       General: She is not in acute distress.     Appearance: She is not toxic-appearing.   HENT:      Head: Normocephalic.      Mouth/Throat:      Mouth: Mucous membranes are moist.   Eyes:      General:         Right eye: No discharge.         Left eye: No discharge.   Cardiovascular:      Rate and Rhythm: Normal rate and regular rhythm.   Pulmonary:      Effort: Pulmonary effort is normal. No respiratory distress.      Breath sounds: No wheezing or rales.   Abdominal:      Palpations: Abdomen is soft.      Tenderness: There is no abdominal tenderness. There is no guarding or rebound.   Musculoskeletal:         General: No swelling.      Cervical back: Neck supple.   Skin:     General: Skin is warm and dry.   Neurological:      Mental Status: She is alert.      Comments: Oriented to self, able to name her family. Unable to name hospital or city or date  Equal strength UE and LE       Fluids      Laboratory  Recent Labs     11/04/22  0535 11/05/22  0415 11/06/22  0530   WBC 25.1* 17.9* 17.6*   RBC 4.20 3.64* 3.20*   HEMOGLOBIN 13.0 11.6* 10.4*   HEMATOCRIT 36.0* 32.6* 29.6*   MCV 85.7 89.6 92.5   MCH 31.0 31.9 32.5   MCHC 36.1* 35.6* 35.1*   RDW 41.7 46.0 49.0   PLATELETCT 232 147* 122*   MPV 10.8 11.3 11.1     Recent Labs     11/05/22  1825 11/05/22  2344 11/06/22  0530   SODIUM 145 149* 152*   POTASSIUM 3.9 3.8 3.9   CHLORIDE 112 115* 117*   CO2 17* 20 21   GLUCOSE 141* 128* 131*   * 108* 100*   CREATININE 3.72* 3.39* 2.69*   CALCIUM 8.9 9.2 9.2                     Imaging  MR-MRA HEAD-W/O   Final Result         1.  Marked  motion artifact but grossly normal MR angiogram of the [place].      MR-MRA NECK-W/O   Final Result      1.  Normal MR angiogram of the carotid arteries and vertebral basilar system..      DX-CHEST-PORTABLE (1 VIEW)   Final Result      1.  No acute cardiac or pulmonary abnormalities are identified.   2.  NG tube side-port projects at the gastroesophageal junction. Recommend advancing.      CT-HEAD W/O   Final Result      1.  BILATERAL frontal subarachnoid blood   2.  Atrophy      Based solely on CT findings, the brain injury guideline category is mBIG 2.      Nondisplaced skull fx   SDH 4.1-7.9mm   IPH 4.1-7.9mm   SAH 1 hemisphere, >3 sulci, 1-3mm      The original BIG retrospective analysis found radiographic progression in 0% of BIG 1 patients and 2.6% BIG 2.         Findings were communicated with and acknowledged by JEREMY M GONDA via Voalte Me on 11/4/2022 4:35 PM.      DX-ABDOMEN FOR TUBE PLACEMENT   Final Result      Enteric tube placement which appears intragastric.      DX-CHEST-PORTABLE (1 VIEW)   Final Result         1.  No acute cardiopulmonary disease.   2.  Trace left pleural effusion      DX-CHEST-PORTABLE (1 VIEW)   Final Result      1.  Tubes and lines in good position.      2.  11 mm nodule in left mid chest.      DX-CHEST-PORTABLE (1 VIEW)   Final Result      1.  No acute cardiopulmonary abnormality.   2.  15 mm left upper lung nodule which is nonspecific. Further evaluation with chest CT may be performed.   3.  No consolidation or pleural effusion.   4.  Nasogastric tube side port is at the GE junction. Recommend advancement.          Assessment/Plan  * Acute metabolic encephalopathy  Assessment & Plan  Due to oxycodone overdose and SAH  PTOTST  Lives alone. Will likely need SNF    Primary hypertension  Assessment & Plan  Goal SBP <140 given SAH  Hypertensive  Hold lisinopril due to AYDEE  Start norvasc    Hypernatremia  Assessment & Plan  Hypernatremic at 152. Increase FWF form 30cc to  100cc q4h    Thrombocytopenia (HCC)  Assessment & Plan  Slowly decreasing, trend and monitor for signs of worsening bleeding    Anemia  Assessment & Plan  Slowly decreasing Hgb  Stool are not bloody, check occult blood  Trend Hgb    Diarrhea  Assessment & Plan  C diff was neg  abd exam benign  Imodium PRN    Subarachnoid hemorrhage (HCC)  Assessment & Plan  MRA head and neck was negative for aneurysm.   Goal SBP <140  IV PRN ordered  PTOTST    Aspiration pneumonia ([facility])  Assessment & Plan  MSSA on sputum culture  Complete unasyn    Hypothermia associated with environmental change  Assessment & Plan  Resolved    Shock (HCC)  Assessment & Plan  Weaned off pressor and steroid    Acute kidney injury ([facility])  Assessment & Plan  Improving, continue IVF and trending    Sepsis ([facility])  Assessment & Plan  This is Sepsis Present on admission  SIRS criteria identified on my evaluation include: Hypothermia, with temperature less than 96.8 deg F and Leukocytosis, with WBC greater than 12,000  Source is lung  Sepsis protocol initiated  Fluid resuscitation ordered per protocol  Crystalloid Fluid Administration: Fluid resuscitation ordered per standard protocol - 30 mL/kg per current or ideal body weight  IV antibiotics as appropriate for source of sepsis  Reassessment: I have reassessed the patient's hemodynamic status          Metabolic acidosis  Assessment & Plan  Resolved    Acute respiratory failure with hypoxia ([facility])  Assessment & Plan  Extubated 11/5  Treating for MSSA PNA, on unasyn  Improved    Narcotic overdose, accidental or unintentional, initial encounter ([facility])- (present on admission)  Assessment & Plan  Resolved  Patient denies suicidal intent

## 2022-11-06 NOTE — THERAPY
Speech Language Pathology   Initial Assessment     Patient Name: Ashleigh Marquis  AGE:  61 y.o., SEX:  female  Medical Record #: [id number]  Today's Date: 11/6/2022     Precautions  Precautions: Swallow Precautions ( See Comments)    HPI: Pt is 61 y.o. female, found unresponsive at home by , reportedly cold and cyanotic on scene. Pt intubated in ED, central line placed. CMHx includes acute metabolic encephalopathy, presumed to be secondary to drug overdose/narcotics, possible brain injury.     Most recent cxr on 11/5 reports no acute cardiac or pulmonary abnormalities are identified.     CT with bilateral frontal SAH.     PMHx: breast cancer, chronic pain (on oxycodone 10 Q4H)    Level of Consciousness: Awake  Affect/Behavior: Cooperative  Follows Directives: Yes - simple commands only  Orientation: Self  Hearing: Functional hearing  Vision: Functional vision    Oral Mechanism Evaluation  Facial Symmetry: Equal  Facial Sensation: Equal  Labial Observations: WFL  Lingual Observations: Midline  Dentition: Poor    Voice  Quality: Strain  Resonance: WFL  Intensity: Appropriate  Cough: WFL    Current Method of Nutrition   NPO until cleared by speech pathology     Assessment  Positioning: Mcclain's (60-90 degrees)  Bolus Administration: SLP and Patient  Oxygen Requirements: Room Air  Factor(s) Affecting Performance: Impaired mental status, Impaired command following    Swallowing Trials  Ice: Impaired  Thin Liquid (TN0): Impaired  Mildly Thick Liquid (MT2): Impaired    Comments: Clinical swallow evaluation completed this date. Pt RN, pt pulled NG last night. Pt oriented to self only, confused in all other spheres. Pt's daughter and ex significant other at bedside during evaluation. Pt able to follow commands for oral mechanism exam, no gross deficits appreciated. Vocal quality strained. PO trials listed above, all of which resulted in inconsistent initiation of swallow trigger, oral holding, or pt attempting  to spit out bolus. Pt was unable to consistently follow commands to trigger swallow. Coughing intermittently appreciated with thins and MTL all of which is concerning for possible penetration/aspiration.     Clinical Impressions: Pt presenting with s/sx concerning for aspiration, suspect acute 2/2 bilateral SAH/ encephalopathy, that is exacerbated by impaired cognition. She does not appear appropriate to initiate a modified diet at this time. Ok for 3-5 single ice chips/hour with nursing only; hold with difficulty. Otherwise, recommend NPO with consideration for alternative source of nutrition.     Recommendations  1.  NPO with alternative source of nutrition. 3-5 single ice chips/hour okay to minimize xerostomia with nursing ONLY; hold with difficulty.   2.  Instrumentation: Instrumental swallow study pending clinical progress  3.  Swallowing Instructions & Precautions:   Positioning: Fully upright and midline during oral intake  Medication: Non Oral   Oral Care: Q6h  4. SLP to follow       Plan  Recommend Speech Therapy 3 times per week until therapy goals are met for the following treatments:  Dysphagia Training.    Discharge Recommendations: Recommend post-acute placement for additional speech therapy services prior to discharge home     Objective     11/06/22 1019   Precautions   Precautions Swallow Precautions ( See Comments)   Vitals   O2 Delivery Device None - Room Air   Pain 0 - 10 Group   Therapist Pain Assessment Post Activity Pain Same as Prior to Activity;Nurse Notified;0   Prior Level Of Function   Communication Unknown   Swallow Unknown   Dentition Poor Quality    Oral Motor Eval    Is Patient Able to Complete Oral Motor Eval Yes, Within Normal Limits   Laryngeal Function   Voice Quality Minimal   Excursion Upon Swallow Weak    Oral Food Presentation   Ice Chips Minimal   Single Swallow Mildly Thick (2) - (Nectar Thick)  Moderate   Single Swallow Thin (0) Moderate   Tracheostomy   Tracheostomy  No  "  Dysphagia Strategies / Recommendations   Strategies / Interventions Recommended (Yes / No) Yes   Compensatory Strategies To Be Assessed   Diet / Liquid Recommendation NPO;Pre-Feeding Trials with SLP Only   Medication Administration  Other (See Comments)  (Non orally)   Therapy Interventions Dysphagia Therapy By Speech Language Pathologist   Follow Up SLP Evaluation SLP to follow   Dysphagia Rating   Nutritional Liquid Intake Rating Scale Nothing by mouth   Nutritional Food Intake Rating Scale Tube dependent with minimal attempts of oral intake   Patient / Family Goals   Patient / Family Goal #1 \"That's good!\"   Short Term Goals   Short Term Goal # 1 Pt will consume pre-feeding trials with SLP and no overt s/sx concerning for aspiration.     "

## 2022-11-06 NOTE — PROGRESS NOTES
I was informed by Dr. Gonda at the bedside that the patient's fecal management system had been removed. FMS was found in bed and likely inadvertently removed by the patient. Will discontinue FMS for now and monitor stool output.

## 2022-11-06 NOTE — ASSESSMENT & PLAN NOTE
Chest11/7 Wbc:18.4  Monitor vitals and labs  C/O aspiration pneumonia  11/4 Cdiff negative  11/3 Blood cultures no growth.  11/3 resp culture with MSSA  Previously completed antibiotic course for MSSA pneumonia monitor off antibiotics    On Unasyn for suspected aspiration pneumonia  Worsening leukocytosis however overall clinically improved  Will continue Unasyn repeat blood cultures, check urinalysis  Monitor clinically and monitor CBC

## 2022-11-07 NOTE — PROGRESS NOTES
Patient arrived from RICU. Patient is Aoxself. Patient follows most commands.     Voiced concerns about Na being 160. Patient also needs cortrak or NG tube placement but according to RICU they tried multiple times over the last few days and were not able to get it . Charge RN Tania contacted IR for placement. Will make sure to follow up. Velásquez recently pulled before getting to unit and patient is due to void by 1830 today. We are trending Sodium, next draw is due at 1400. Can draw from right subclavian port. Goal is to keep SBP <140. Current pressure on unit; /94   96% on room air. Started a new bag of D5W @125/hr. Will continue to closely monitor patient.

## 2022-11-07 NOTE — THERAPY
"Speech Language Pathology  Daily Treatment     Patient Name: Ashleigh Marquis  Age:  61 y.o., Sex:  female  Medical Record #: [id number]  Today's Date: 11/7/2022     Precautions: Fall Risk, Swallow Precautions ( See Comments)    Assessment  SLP administered trials of ice chips, thin liquids by spoon/cup and puree consistencies. Appropriate oral acceptance. Presumed delayed initiation of uncoordinated lingual motion and bolus manipulation with subsequent frequent anterior bolus loss of her own secretions, ice chips and thin liquids. Pt also demonstrated frequent bolus holding and eventual anterior bolus loss when pt talked without propelling the bolus posteriorly to swallow. Verbal cues provided to \"swallow\" were unsuccessful. No difference with larger volume boluses. Occasional hyolaryngeal movement was present to digital palpation; however suspect swallow is delayed, evidenced by absent bolus in oral cavity prior to swallow initiation. Anterior bolus holding of puree material with eventual expectoration.    Clinical Impressions  The pt presents with clinical signs of dysphagia c/b anterior bolus loss, uncoordinated lingual movement/bolus manipulation, bolus holding and suspect delayed and/or absent swallow onset with poor response to verbal cues to \"swallow\". This appears likely acute 2/2 SAH with impaired cognition. Aspiration risk from oral intake, as well as risk for malnutrition/dehydration appears elevated with indication to continue NPO status at this time. Swallow prognosis is fair depending on cognitive improvement.        Recommendations  1.  NPO with consideration of replacing NGT  2.  Swallowing Instructions & Precautions:   Oral Care: Q4h  3.  SLP to follow for dysphagia management to determine readiness to begin oral intake.  4. The pt would benefit from a cognitive evaluation given concern for anoxic brain injury. Will need physician orders to proceed.    Results and recs d/w pt and RN. Thank " "you.      Plan  Treatment plan modified to 5 times per week until therapy goals are met for the following treatments:  Dysphagia Training.  Discharge Recommendations: Recommend post-acute placement for additional speech therapy services prior to discharge home    Subjective  RN cleared the pt for speech tx, no acute changes reported. Noted multiple unsuccessful attempts to replace NGT by nursing staff. Pt was received awake and alert sitting [facility] following PT/OT evaluations. Pt was pleasant and cooperative, required additional time to answer questions. No visitors present.       Objective     11/07/22 1012   Charge Group   SLP Swallowing Dysfunction Treatment Swallowing Dysfunction Treatment   Total Treatment Time   SLP Time Spent Yes   SLP Swallowing Dysfunction Treatment (Mins) 15   Vitals   O2 Delivery Device Room air w/o2 available   Pain 0 - 10 Group   Therapist Pain Assessment Post Activity Pain Same as Prior to Activity   Patient / Family Goals   Patient / Family Goal #1 \"That's good!\"   Goal #1 Outcome Progressing as expected   Short Term Goals   Short Term Goal # 1 Pt will consume pre-feeding trials with SLP and no overt s/sx concerning for aspiration.   Goal Outcome # 1 Progressing as expected     "

## 2022-11-07 NOTE — DISCHARGE PLANNING
Case Management Discharge Planning    Admission Date: 11/3/2022  GMLOS: 5  ALOS: 4    6-Clicks ADL Score: 12  6-Clicks Mobility Score: 12  PT and/or OT Eval ordered: Yes  Post-acute Referrals Ordered: No  Post-acute Choice Obtained: No  Has referral(s) been sent to post-acute provider:  No    Anticipated Discharge Dispo: Discharge Disposition: D/T to SNF with Medicare cert in anticipation of skilled care (03)    DME Needed: No    Action(s) Taken: Patient Conference, Family Conference, and OTHER    Escalations Completed: None    Medically Clear: No    Next Steps: CM called patient HAYLEE Carter ( [phone]) to discuss discharge planning needs. Informed sister patient currently pending PT/OT eval but patient may need some rehab prior to returning home, sister agreeable, but does not live local, reports the patient has an outside SW that assist with her care, she reports she will call the SW and inform her to give me a call to assist with a facility choice for the patient . CM awaiting phone call back.     Barriers to Discharge: Medical clearance, Pending Placement, and Pending PT Evaluation    Is the patient up for discharge tomorrow: No      3:03 PM UPDATE : CM rec call back from Yanelis stating she was unable to reach pt SW, CM emailed SNF choice list to patient sister to review and provide her preferences.   JUDI emailed : [email or web address]  Provided Yanelis with CM numbers for patients current unit as well     Angle RUVALCABA, RN CCM   Care Coordinator

## 2022-11-07 NOTE — PROGRESS NOTES
"Hospital Medicine Daily Progress Note    Date of Service  2022    Chief Complaint  Found unresponsive    Hospital Course  Ashleigh Marquis is a 61 y.o. female with a history of breast cancer, chronic pain on oxycodone.  She was found unresponsive by a .  Paramedics arrived placed her on 15 L nonrebreather mask patient was tachycardic with heart rate in the 190s, she was hypothermic 93 °F.  Per report she was given Narcan and had some response.  Admitted 11/3/2022 with acute encephalopathy.  Labs here at the hospital showed acute kidney injury, hypokalemia, metabolic acidosis and concern of sepsis.  Patient was initially intubated placed on bicarb started on pressor support as well as broad-spectrum antibiotics of Zosyn and linezolid.  CT of the head showed by frontal subarachnoid hemorrhages.  MRA of the head and neck was negative for aneurysm.  The subarachnoid hemorrhage was felt due to be from trauma.  Patient was extubated  and weaned off of pressors.  Patient did grow out MSSA from her sputum antibiotics were adjusted to Unasyn.    Interval Problem Update  : Na:159, BUN:69, Cr:1.33, M.6. Awake and alert.  Unable to take orals per speech and needs NG tube.  Unsuccessful attempts to place NG \"6 times\" per nursing and IR consult order placed for hopeful NG tube placement.  Patient follows commands.  She is mentally slow but appropriate.      I have discussed this patient's plan of care and discharge plan at IDT rounds today with Case Management, Nursing, Nursing leadership, and other members of the IDT team.    Consultants/Specialty  critical care    Code Status  DNAR/DNI    Disposition  Patient is not medically cleared for discharge.   Anticipate discharge to  be determined .  I have placed the appropriate orders for post-discharge needs.    Review of Systems  Review of Systems   Unable to perform ROS: Mental acuity   Constitutional:  Negative for malaise/fatigue.   Eyes:  Negative " for discharge.   Respiratory:  Positive for cough. Negative for shortness of breath and stridor.    Cardiovascular:  Negative for palpitations and leg swelling.   Gastrointestinal:  Negative for abdominal pain, diarrhea and nausea.   Genitourinary:  Negative for dysuria.   Musculoskeletal:  Negative for back pain and joint pain.   Skin:  Negative for rash.   Neurological:  Negative for dizziness and headaches.   Psychiatric/Behavioral:  The patient is not nervous/anxious.       Physical Exam  Temp:  [36.5 °C (97.7 °F)] 36.5 °C (97.7 °F)  Pulse:  [] 111  Resp:  [10-80] 18  BP: (111-166)/() 126/94  SpO2:  [93 %-97 %] 96 %    Physical Exam    Fluids    Intake/Output Summary (Last 24 hours) at 11/7/2022 1329  Last data filed at 11/7/2022 0604  Gross per 24 hour   Intake 2141.13 ml   Output 2575 ml   Net -433.87 ml       Laboratory  Recent Labs     11/05/22 0415 11/06/22  0530 11/07/22  0415   WBC 17.9* 17.6* 18.4*   RBC 3.64* 3.20* 3.35*   HEMOGLOBIN 11.6* 10.4* 10.8*   HEMATOCRIT 32.6* 29.6* 31.7*   MCV 89.6 92.5 94.6   MCH 31.9 32.5 32.2   MCHC 35.6* 35.1* 34.1   RDW 46.0 49.0 50.6*   PLATELETCT 147* 122* 124*   MPV 11.3 11.1 11.6     Recent Labs     11/05/22  2344 11/06/22  0530 11/07/22  0415 11/07/22  0907   SODIUM 149* 152* 159* 160*   POTASSIUM 3.8 3.9 3.6  --    CHLORIDE 115* 117* 122*  --    CO2 20 21 21  --    GLUCOSE 128* 131* 116*  --    * 100* 69*  --    CREATININE 3.39* 2.69* 1.33  --    CALCIUM 9.2 9.2 9.6  --                    Imaging  MR-MRA HEAD-W/O   Final Result         1.  Marked motion artifact but grossly normal MR angiogram of the [place] of [place].      MR-MRA NECK-W/O   Final Result      1.  Normal MR angiogram of the carotid arteries and vertebral basilar system..      DX-CHEST-PORTABLE (1 VIEW)   Final Result      1.  No acute cardiac or pulmonary abnormalities are identified.   2.  NG tube side-port projects at the gastroesophageal junction. Recommend advancing.     "  CT-HEAD W/O   Final Result      1.  BILATERAL frontal subarachnoid blood   2.  Atrophy      Based solely on CT findings, the brain injury guideline category is mBIG 2.      Nondisplaced skull fx   SDH 4.1-7.9mm   IPH 4.1-7.9mm   SAH 1 hemisphere, >3 sulci, 1-3mm      The original BIG retrospective analysis found radiographic progression in 0% of BIG 1 patients and 2.6% BIG 2.         Findings were communicated with and acknowledged by JEREMY M GONDA via [employer] on 11/4/2022 4:35 PM.      DX-ABDOMEN FOR TUBE PLACEMENT   Final Result      Enteric tube placement which appears intragastric.      DX-CHEST-PORTABLE (1 VIEW)   Final Result         1.  No acute cardiopulmonary disease.   2.  Trace left pleural effusion      DX-CHEST-PORTABLE (1 VIEW)   Final Result      1.  Tubes and lines in good position.      2.  11 mm nodule in left mid chest.      DX-CHEST-PORTABLE (1 VIEW)   Final Result      1.  No acute cardiopulmonary abnormality.   2.  15 mm left upper lung nodule which is nonspecific. Further evaluation with chest CT may be performed.   3.  No consolidation or pleural effusion.   4.  Nasogastric tube side port is at the GE junction. Recommend advancement.      IR-CONSULT AND TREAT    (Results Pending)        Assessment/Plan  * Acute metabolic encephalopathy  Assessment & Plan  Possibly due to oxycodone overdose and SAH  PTOTST  Lives alone.   11/7 per RN: \"family states patient not at mental baseline\"  Will likely need SNF    Primary hypertension  Assessment & Plan  Goal SBP <140 given SAH  Hypertensive  Hold lisinopril due to AYDEE  Start norvasc    Hypernatremia  Assessment & Plan  11/7 Na:160  On D5W at 125cc/hr  Monitor q 6 hr Na levels  Needs enteral NG tube and then will start free water boluses.    Thrombocytopenia (HCC)  Assessment & Plan  Slowly decreasing, trend and monitor for signs of worsening bleeding  11/7 PLT:124  Monitor cbc    Anemia  Assessment & Plan  11/7 Hgb:10.8 < 10.4 <11.6  Trend " Hgb  normocytic    Diarrhea  Assessment & Plan  C diff was neg  abd exam benign  Imodium PRN    Subarachnoid hemorrhage (HCC)  Assessment & Plan  MRA head and neck was negative for aneurysm.   Goal SBP <140  11/7 metoprolol 5mg IV q 6 hr with parameters ordered.  Await oral access with NG tube or her to be able to safely swallow  IV PRN antihypertensives if need  PT/OT/ST    Aspiration pneumonia (HCC)  Assessment & Plan  MSSA on sputum culture  Complete unasyn    Hypothermia associated with environmental change  Assessment & Plan  Resolved    Shock (HCC)  Assessment & Plan  Weaned off pressor and steroid    Hypokalemia  Assessment & Plan  Monitor K  Replace as needed    Acute kidney injury (HCC)  Assessment & Plan  Improving, continue IVF and trending  11/7 BUN:69, Cr:1.33  11/6 BUN:100, Cr:2.69  Monitor labs, vitals, I/O's    Sepsis (HCC)  Assessment & Plan  11/7 Wbc:18.4  Monitor vitals and labs  C/O aspiration pneumonia  11/4 Cdiff negative  11/3 Blood cultures no growth.  11/3 resp culture with MSSA  On day #5 antibiotics and day #4 unasyn.    Metabolic acidosis  Assessment & Plan  Resolved    Acute respiratory failure with hypoxia (HCC)  Assessment & Plan  Extubated 11/5  Treating for MSSA PNA, on unasyn  Speech therapy  RN state unable to successfully place nasal gastric tube for enteral feeding  Aspiration precautions.    Narcotic overdose, accidental or unintentional, initial encounter ([facility])- (present on admission)  Assessment & Plan  Resolved  Patient denies suicidal intent       VTE prophylaxis: SCDs/TEDs    I have performed a physical exam and reviewed and updated ROS and Plan today (11/7/2022). In review of yesterday's note (11/6/2022), there are no changes except as documented above.

## 2022-11-07 NOTE — THERAPY
"Physical Therapy   Initial Evaluation     Patient Name: Ashleigh Marquis  Age:  61 y.o., Sex:  female  Medical Record #: [id number]  Today's Date: 11/7/2022     Precautions  Precautions: Fall Risk;Swallow Precautions ( See Comments)  Comments: MAP >65 and SBP < 140 goals    Assessment  Patient is 61 y.o. female found down by , admitted with diagnosis of narcotic overdose requiring intubation (extubated 11/5), found B frontal SAH. Pt with copious foaming secretions from her mouth throughout evaluation. Oriented to name only. Pt mobilized as detailed below. Pt with labile BP with mobility (see details below), pt denied lightheadedness, dizziness. Pt presents with impaired cognition, safety awareness, command following, orientation, balance, coordination, generalized weakness, impaired activity tolerance and endurance. Recommend placement, will continue to follow to address above deficits.     Plan    Recommend Physical Therapy 4 times per week until therapy goals are met for the following treatments:  Bed Mobility, Equipment, Gait Training, Manual Therapy, Neuro Re-Education / Balance, Self Care/Home Evaluation, Stair Training, Therapeutic Activities, and Therapeutic Exercises    DC Equipment Recommendations: Unable to determine at this time  Discharge Recommendations: Recommend post-acute placement for additional physical therapy services prior to discharge home       Subjective    \"The pink and the purple one!\" Pt repeated multiple times when attempting to obtain subjective history     Objective     11/07/22 0956   Vitals   O2 Delivery Device None - Room Air   Vitals Comments /67 seated after STS. BP & HR then 111/70, 108 once transferred to chair. Pt denied lightheadedness, dizziness   Pain 0 - 10 Group   Therapist Pain Assessment Post Activity Pain Same as Prior to Activity;0;Nurse Notified   Prior Living Situation   Prior Services   ( checking on patient)   Housing / Facility 1 " Story Apartment / Condo   Steps Into Home 3   Bathroom Set up Bathtub / Shower Combination   Equipment Owned 4-Wheel Walker   Lives with - Patient's Self Care Capacity Alone and Able to Care For Self   Comments Pt unable to provide subjective history given cognition. OT spoke with pt's sister and daughter over the phone to obtain info.   Prior Level of Functional Mobility   Bed Mobility Independent   Transfer Status Independent   Ambulation Independent   Distance Ambulation (Feet)   (community)   Assistive Devices Used None   Stairs Independent   Comments Uses bus vs. friends for transportation   Cognition    Cognition / Consciousness X   Speech/ Communication Delayed Responses;Incomprehensible Words;Slurred   Level of Consciousness Alert   Safety Awareness Impaired   New Learning Impaired   Attention Impaired   Sequencing Impaired   Initiation Impaired   Comments Pleasant and cooperative. Odd affect, foaming at the mouth, unable to accurately respond to most questions, however, able to follow 1-2 step commands 90% of time. Pt very tremulous in general   Passive ROM Lower Body   Passive ROM Lower Body WDL   Active ROM Lower Body    Active ROM Lower Body  WDL   Comments lacking full LAQ at EOB likely 2/2 impaired hamstring length   Strength Lower Body   Lower Body Strength  X   Gross Strength Generalized Weakness, Equal Bilaterally   Comments grossly 3+/5 BLE   Sensation Lower Body   Lower Extremity Sensation   WDL   Comments denies N/T   Lower Body Muscle Tone   Lower Body Muscle Tone  WDL   Strength Upper Body   Upper Body Strength  X   Gross Strength Generalized Weakness, Equal Bilaterally.    Comments grossly 3+/5 BUE upon attempted assessment, however, appeared more 4/5 with functional mobility   Neurological Concerns   Neurological Concerns Yes   Comments given B frontal SAH   Coordination Upper Body   Comments difficulty assessing 2/2 impaired cognition   Coordination Lower Body    Comments same as above    Vision   Vision Comments Pt with what appeared to be spontaneous direction changing nystagmus lasting <5 seconds at a time. Pt unable to follow EOM testing   Balance Assessment   Sitting Balance (Static) Fair   Sitting Balance (Dynamic) Fair   Standing Balance (Static) Poor +   Standing Balance (Dynamic) Poor +   Weight Shift Sitting Fair   Weight Shift Standing Poor   Comments via B HHA   Gait Analysis   Gait Level Of Assist Unable to Participate   Comments deferred given cognition, impaired command following, safety awareness, and brief increased BP with STS. Pt was able to take a few steps to the chair via B HHA and Yoni   Bed Mobility    Supine to Sit Minimal Assist   Sit to Supine   (up in chair post)   Scooting Minimal Assist   Comments HOB minimally raised   Functional Mobility   Sit to Stand Minimal Assist   Bed, Chair, Wheelchair Transfer Minimal Assist   Transfer Method Stand Step   Mobility EOB steps to recliner   ICU Target Mobility Level   ICU Mobility - Targeted Level Level 3B   How much difficulty does the patient currently have...   Turning over in bed (including adjusting bedclothes, sheets and blankets)? 1   Sitting down on and standing up from a chair with arms (e.g., wheelchair, bedside commode, etc.) 1   Moving from lying on back to sitting on the side of the bed? 1   How much help from another person does the patient currently need...   Moving to and from a bed to a chair (including a wheelchair)? 3   Need to walk in a hospital room? 2   Climbing 3-5 steps with a railing? 2   6 clicks Mobility Score 10   Activity Tolerance   Sitting in Chair up post   Sitting Edge of Bed 10 min   Standing < 2 min   Comments limited 2/2 cognition, balance, fatigue   Edema / Skin Assessment   Edema / Skin  Not Assessed   Patient / Family Goals    Patient / Family Goal #1 none stated   Short Term Goals    Short Term Goal # 1 Pt will perform supine <> sit with SPV in 6 visits to return to PLOF   Short Term Goal #  2 Pt will perform STS tranfsers with FWW and SPV in 6 visits to improve functional independence   Short Term Goal # 3 Pt will ambulate 50ft with FWW and CGA in 6 visits to initiate gait training   Education Group   Education Provided Role of Physical Therapist;Gait Training;Transfer Status   Role of Physical Therapist Patient Response Patient;Acceptance;Explanation;No Learning Evidence   Gait Training Patient Response Patient;Acceptance;Explanation;Action Demonstration;No Learning Evidence   Transfer Status Patient Response Patient;Acceptance;Explanation;Action Demonstration;No Learning Evidence   Additional Comments Suspect no learning when provided with self management and compensatory strategies with mobility 2/2 impaired cognition   Problem List    Problems Impaired Bed Mobility;Impaired Transfers;Impaired Ambulation;Functional Strength Deficit;Impaired Coordination;Impaired Balance;Impaired Vision;Decreased Activity Tolerance;Safety Awareness Deficits / Cognition;Motor Planning / Sequencing   Anticipated Discharge Equipment and Recommendations   DC Equipment Recommendations Unable to determine at this time   Discharge Recommendations Recommend post-acute placement for additional physical therapy services prior to discharge home   Interdisciplinary Plan of Care Collaboration   IDT Collaboration with  Nursing;Occupational Therapist   Patient Position at End of Therapy Seated;Chair Alarm On;Call Light within Reach;Tray Table within Reach;Phone within Reach   Collaboration Comments Rn updated   Session Information   Date / Session Number  11/7- 1 (1/4, 11/13)

## 2022-11-07 NOTE — PROGRESS NOTES
IR Nursing Note:    Order received for NG tube placement.  Per Dr. Henson this is not a service IR can assist with.  Bedside RN updated, potential suggestions offered.  Order cancelled per MD.

## 2022-11-07 NOTE — THERAPY
Occupational Therapy   Initial Evaluation     Patient Name: Ashleigh Marquis  Age:  61 y.o., Sex:  female  Medical Record #: [id number]  Today's Date: 11/7/2022     Precautions  Precautions: Fall Risk, Swallow Precautions ( See Comments)  Comments: MAP >65 and SBP < 140 goals    Assessment  Patient is 61 y.o. female with a diagnosis of narcotic overdose, found down when  was checking on her. Hx of breast CA.  Additional factors influencing patient status / progress: weakness, fatigue, impaired balance, but primarily impaired cognition; see below.      Plan    Recommend Occupational Therapy 4 times per week until therapy goals are met for the following treatments:  Adaptive Equipment, Cognitive Skill Development, Self Care/Activities of Daily Living, Therapeutic Activities, and Therapeutic Exercises.    DC Equipment Recommendations: Unable to determine at this time  Discharge Recommendations: Recommend post-acute placement for additional occupational therapy services prior to discharge home        Objective       11/07/22 0937   Prior Living Situation   Prior Services Home-Independent   Housing / Facility 1 Story Apartment / Condo   Steps Into Home 3   Bathroom Set up Bathtub / Shower Combination   Equipment Owned 4-Wheel Walker   Lives with - Patient's Self Care Capacity Alone and Able to Care For Self   Comments Pt unable to provide information due to impaired cognition. Call placed to Jaky, pt's sister and MPOA, however Jaky was unable to provide information as it appears she was quite estranged from the patient. Sounds like complicated family dynamics. Jaky told this therapist to contact pt's dtrKatalina to ask these questions since Katalina visits pt more regularly. Katalina reports that she lives in low income housing in which they have a shared kitchen/living space. She is independent normally, uses the bus to get to appts or friends will drive her sometimes.   Prior Level of ADL Function    Self Feeding Independent   Grooming / Hygiene Independent   Bathing Independent   Dressing Independent   Toileting Independent   Prior Level of IADL Function   Medication Management Independent   Laundry Independent   Kitchen Mobility Independent   Finances Independent   Home Management Independent   Shopping Independent   Prior Level Of Mobility Independent Without Device in Community;Independent Without Device in Home   Driving / Transportation Relatives / Others Provide Transportation;Utilizes Public Transportation   Occupation (Pre-Hospital Vocational) Not Employed   Precautions   Precautions Fall Risk;Swallow Precautions ( See Comments)   Comments MAP >65, SBP <140   Pain 0 - 10 Group   Therapist Pain Assessment Nurse Notified;0   Cognition    Cognition / Consciousness X   Speech/ Communication Delayed Responses   Level of Consciousness Alert   Safety Awareness Impaired   New Learning Impaired   Attention Impaired   Sequencing Impaired   Initiation Impaired   Comments pleasant and cooperative, childlike affect. unable to answer questions appropriately at times. Struggles with complex direction. Per dtr ayse Darden with totally normal cognition prior to this incident.   Active ROM Upper Body   Comments unable to formally assess due to cog, appears WFL   Strength Upper Body   Comments unable to formally assess due to cog, appears WFL   Balance Assessment   Sitting Balance (Static) Fair   Sitting Balance (Dynamic) Fair   Standing Balance (Static) Poor +   Standing Balance (Dynamic) Poor +   Weight Shift Sitting Fair   Weight Shift Standing Poor   Bed Mobility    Supine to Sit Minimal Assist   Scooting Minimal Assist   ADL Assessment   Grooming Seated;Minimal Assist  (wipe face)   Lower Body Dressing Maximal Assist   Comments pt unsure what to do with socks when asked to put socks on. gave demo of putting sock on L foot, pt then attempted to put R sock on but was unable to do so successfully.   How much help  from another person does the patient currently need...   Putting on and taking off regular lower body clothing? 2   Bathing (including washing, rinsing, and drying)? 2   Toileting, which includes using a toilet, bedpan, or urinal? 2   Putting on and taking off regular upper body clothing? 3   Taking care of personal grooming such as brushing teeth? 3   Eating meals? 3   6 Clicks Daily Activity Score 15   mRS Prior to admission   Prior to admission mRS 0   Modified Yamile (mRS)   Modified Dawes Score 4   Functional Mobility   Sit to Stand Minimal Assist   Bed, Chair, Wheelchair Transfer Minimal Assist   Transfer Method Stand Pivot   Mobility EOB>Recliner   Comments w/ HHA   Patient / Family Goals   Patient / Family Goal #1 go home   Short Term Goals   Short Term Goal # 1 pt will demo toilet txf with supv   Short Term Goal # 2 pt will groom in stance at the sink with supv   Short Term Goal # 3 pt will dress LB with supv   Short Term Goal # 4 pt will follow 2 step direction 75% of the time

## 2022-11-07 NOTE — PROGRESS NOTES
[facility] Coverage Progress Note:     Contacted this morning for critical serum sodium from 152 to 159. Per the patient's bedside nurse, the patient has undergone 6 attempts at NGT placement without success and thus is not getting her free-water flushes. Serum sodium has been trending up over the past 48 hours from 149 on 11/5 to 159 this morning.     Plan:     Hypernatremia  Suspect 2/2 inadequate free water intake due to NPO status   Free water deficit calculated at 3.1L, target Na of 147 in 24 hours  -D5W at 125ml/hour ordered   -Repeat serum Na q 4 hours, next at 1000    ADRIENNE Melendrez Hospitalist

## 2022-11-07 NOTE — PROGRESS NOTES
I notified Dr. Horowitz via Voalte at this time to inform the physician that we were not able to place a nasogastric nor orogastric enteral tube. Patient does not currently have enteral access. Informed the physician that I am unable to give scheduled amlodipine. Physician acknowledged.

## 2022-11-08 PROBLEM — R13.10 DYSPHAGIA: Status: ACTIVE | Noted: 2022-01-01

## 2022-11-08 PROBLEM — T40.601A NARCOTIC OVERDOSE, ACCIDENTAL OR UNINTENTIONAL, INITIAL ENCOUNTER (HCC): Status: RESOLVED | Noted: 2022-01-01 | Resolved: 2022-01-01

## 2022-11-08 PROBLEM — R57.9 SHOCK (HCC): Status: RESOLVED | Noted: 2022-01-01 | Resolved: 2022-01-01

## 2022-11-08 PROBLEM — E87.20 METABOLIC ACIDOSIS: Status: RESOLVED | Noted: 2022-01-01 | Resolved: 2022-01-01

## 2022-11-08 PROBLEM — T68.XXXA HYPOTHERMIA ASSOCIATED WITH ENVIRONMENTAL CHANGE: Status: RESOLVED | Noted: 2022-01-01 | Resolved: 2022-01-01

## 2022-11-08 NOTE — PROGRESS NOTES
Lab called with a critical lab value. Sodium went up to 159. Night TETO Hill made aware in report. Will let team know.

## 2022-11-08 NOTE — PROGRESS NOTES
"ICU Supervisor placed Cortrak with MD order. 10 Turkish in the left nare bridled at 84cm. Imaging showed \"Enteric feeding tube appears to be kinked in the expected location of the second portion of the duodenum with the tip terminating in the left upper quadrant, likely intragastric.\" Cortrak pulled back to 77cm and re-bridled, new imaging ordered.   "

## 2022-11-08 NOTE — THERAPY
"Speech Language Pathology  Daily Treatment     Patient Name: Ashleigh Marquis  Age:  61 y.o., Sex:  female  Medical Record #: [id number]  Today's Date: 11/8/2022     Precautions  Precautions: (P) Swallow Precautions ( See Comments), Fall Risk  Comments: MAP >65 and SBP < 140 goals    Assessment    SLP suctioned large amount of thick secretions from patient's chin upon arrival at bedside.  She was administered trials of ice chips, thin liquids by spoon/cup and puree consistencies. Patient appeared happy to receive po, but frequently bit the spoon vs taking liquid/food from it, losing a portion of the bolus from her mouth at times. She has a suspected delayed initiation of uncoordinated lingual motion with minimal to no bolus manipulation and frequent anterior bolus loss of her own secretions, ice chips and thin liquids. Pt also demonstrated frequent bolus holding and eventual anterior bolus loss when she talked prior to initiating a swallow. Verbal cues provided to \"swallow\" were inconsistent.       Clinical Impressions  The pt presents with clinical signs of dysphagia c/b anterior bolus loss, uncoordinated lingual movement/bolus manipulation, bolus holding and suspect delayed and/or absent swallow onset with poor response to verbal cues to \"swallow\". This appears likely acute 2/2 SAH with impaired cognition. Aspiration risk from oral intake, as well as risk for malnutrition/dehydration appears elevated with indication to continue NPO status at this time. Swallow prognosis is fair depending on cognitive improvement.         Recommendations  1.  NPO with use of NGT when able per Rn.  2.  Swallowing Instructions & Precautions:   Oral Care: Q4h  3.  SLP to follow for dysphagia management to determine readiness to begin oral intake.  4. MD stated will order cognitive evaluation.       Results and recs d/w pt RN and MD. Thank you.    Plan    Continue current treatment plan.    Discharge Recommendations: (P) Recommend " "post-acute placement for additional speech therapy services prior to discharge home    Subjective    Rn cleared the patient for speech therapy.  She reports they had difficulty placing the NGT and are still not using it yet for nutrition due to coiling.  She reports that she has had to suction copious thick secretions from patient's face this morning.      Objective       11/08/22 0926   Precautions   Precautions Swallow Precautions ( See Comments);Fall Risk   Vitals   O2 Delivery Device None - Room Air   Pain 0 - 10 Group   Therapist Pain Assessment Post Activity Pain Same as Prior to Activity   Patient / Family Goals   Patient / Family Goal #1 \"That's good!\"   Goal #1 Outcome Progressing as expected   Short Term Goals   Short Term Goal # 1 Pt will consume pre-feeding trials with SLP and no overt s/sx concerning for aspiration.   Goal Outcome # 1 Progressing as expected   Education Group   Education Provided Dysphagia;Role of Speech Therapy   Dysphagia Patient Response Patient;Acceptance;Explanation;No Learning Evidence   Role of SLP Patient Response Patient;Acceptance;Explanation;No Learning Evidence   Anticipated Discharge Needs   Discharge Recommendations Recommend post-acute placement for additional speech therapy services prior to discharge home   Therapy Recommendations Upon DC Dysphagia Training;Patient / Family / Caregiver Education   Interdisciplinary Plan of Care Collaboration   IDT Collaboration with  Nursing;Physician   Patient Position at End of Therapy In Bed;Bed Alarm On;Call Light within Reach   Collaboration Comments Rn updated     "

## 2022-11-08 NOTE — PROGRESS NOTES
4 Eyes Skin Assessment Completed by TETO Jacobsen and TETO Hill.    Head WDL  Ears WDL  Nose WDL  Mouth Bleeding from trauma of NG and cortrak placement   Neck WDL  Breast/Chest WDL  Shoulder Blades WDL  Spine WDL  (R) Arm/Elbow/Hand Redness, Blanching, and Abrasion, generalized bruising   (L) Arm/Elbow/Hand Redness and Blanching, generalized bruising   Abdomen Bruising   Groin WDL  Scrotum/Coccyx/Buttocks Redness, Blanching, Excoriation, and Moisture Fissure  (R) Leg Bruising and Swelling  (L) Leg Bruising and Swelling  (R) Heel/Foot/Toe Redness, Blanching, and Swelling, dryness   (L) Heel/Foot/Toe Redness, Blanching, and Swelling, dryness          Devices In Places Tele Box, Pulse Ox, SCD's, Cortrak, and Central Line      Interventions In Place Pillows, Q2 Turns, Barrier Cream, Heels Loaded W/Pillows, and Pressure Redistribution Mattress    Possible Skin Injury Yes    Pictures Uploaded Into Epic Yes  Wound Consult Placed N/A  RN Wound Prevention Protocol Ordered No

## 2022-11-08 NOTE — PROGRESS NOTES
Called to bedside at approximately 2215 to reposition cortrak. Per abdominal x-ray, cortrak is kinked in the stomach. Cotrak originally at 80 cm. Pulled back to 70 cm and repeat x-ray ordered. Repeat x-ray reveals cotrak is still coiled  in the stomach. Tube withdrawn to 63 cm. Repeat x-ray reveals that tube is still coiled, however appropriate placement for medication administration/feeding.

## 2022-11-08 NOTE — DISCHARGE PLANNING
JORGE received VM from patient's sister/Yanelis with SNF choices:  [facility], [facility], [facility].    Does NOT want:  [facility]    SNF choice list faxed to [facility].    PLAN: Await SNF determination.

## 2022-11-08 NOTE — DISCHARGE PLANNING
Received Choice Form @: 1534  Agency/ Facility Name: [facility], [facility], [facility]  Referral Sent per Choice Form @: 3279

## 2022-11-08 NOTE — ASSESSMENT & PLAN NOTE
SLP and aspiration precautions  Continue thiamine and monitor electrolytes  [facility] diet per SLP

## 2022-11-08 NOTE — DIETARY
Nutrition Services Brief Update:    Problem: Nutritional:  Goal: Nutrition support tolerated and meeting greater than 85% of estimated needs  Outcome: Progressing slower than expected    TF originally ordered 11/4, removed by pt 11/5 and had not been able to replace until today. Per KUB 11/8/22, tube is coiled in fundus. Per RN, to start trickle feeds today.   Labs: K+ 2.9, phos 1.8, and mag 1.3   MAR: D5 @ 150 ml/hr mag sulfate IVPB, Kphos IVPB in D5W    Per RN, plan to start trickle feeds today. Recommend starting tube feeds Novasource Renal at 10 ml/hr. Do not advance until cleared by MD; when cleared to advance TF, recommend increasing rate by 10 ml q24hrs until goal rate of 35 ml/hr is reached. Given abnormal electrolyte labs, no significant/consistent nutritional intake this admit, and unknown PO intake PTA, pt may be at risk of refeeding syndrome. IV D5 may be contributing to this. Recommend addition of thiamine 100 mg/day to aid in glucose metabolism. Noted that refeeding labs have been ordered per MD. Recommend thiamine supplement and refeeding labs x 7 days. Relayed concerns and recommendations via Voalte message to MD Solomon Martinez; acknowledgement received.     RD following.

## 2022-11-08 NOTE — DISCHARGE PLANNING
Case Management Discharge Planning    Admission Date: 11/3/2022  GMLOS: 5  ALOS: 5    6-Clicks ADL Score: 15  6-Clicks Mobility Score: 10  PT and/or OT Eval ordered: Yes  Post-acute Referrals Ordered: Yes  Post-acute Choice Obtained: No  Has referral(s) been sent to post-acute provider:  No      Anticipated Discharge Dispo: Discharge Disposition: D/T to SNF with Medicare cert in anticipation of skilled care (03)    DME Needed: TBD    Action(s) Taken: OTHER    LMSW called patient's sister/Yanelis to ask about SNF preferences. Yanelis states that she is still reviewing, LMSW encouraged Yanelis to look at www.medicare.gov for ratings for facilities and to call this LMSW back. LMSW made Ynaelis aware that, with patient's insurance/Medicaid FFS it may be difficult to locate placement and may need to do blanket SNF referral. Yanelis okay with this.    LMSW called patient's daughter/Katalina per RN request. Katalina asked for assistance with FMLA paperwork. LMSW asked Katalina to email paperwork to LMSW.    Escalations Completed: None    Medically Clear: No    Next Steps: Awaiting FMLA form from daughter/Kaatlina. Await SNF choices sister/Yanelis.     Barriers to Discharge: Medical clearance and Pending Placement    Is the patient up for discharge tomorrow: No

## 2022-11-08 NOTE — WOUND TEAM
Wound team note:   Pt seen for placement of BMS. MD order verified. Pt assessed, noted to be incontinent of loose brown stool. Sacrum/buttocks with MASD, but no pressure injuries noted, barrier paste applied. Sphincter tone determined to be adequate. BMS placed without difficulty,  40 mL of tap water placed in retention balloon, irrigated with  60 mL warm tap water. Confirmed irrigant/stool output in tube. Nursing to irrigate q shift with 60 mL-120 mL warm tap water or until patent.      This was the second BMS placement. First was placed on 11/5/22 by superuser.

## 2022-11-08 NOTE — PROGRESS NOTES
2046 - Contacted SICU charge nurse regarding pt's order for rectal tube. RN, Kt Worrell came up and placed a rectal trumpet in the pt to manage the pt's excoriation and diarrhea.     2107 - Spoke with supervisor, Arik regarding getting pt's cortrak adjusted. Day shift was unable to get correct placement. CICU supervisor, Lexy Maria, came and adjusted cortrak. X-Ray still showed the line was looped. CICU supervisor notified.Cortrak is not being used at this time.     0106 - Pt's BP has been elevated and PRN medications were given. Hospitalist notified, and instructed to give one more dose of PRN Hydralazine. Gave dose, and rechecked BP. BP now at 132/78.     0149 - CICU supervisor came to adjust placement of cortrak again. X-Ray ordered to confirm placement.       0313 - Lab called at 0308 regarding critical lab. Sodium at 157. On-call hospitalist, Mallory Gaspar notified.     0418 - Called Mallory Gaspar to see if pt meets criteria for a higher level of care after speaking with the charge nurse. Pt does not meet criteria for a higher level of care at this time. Notified Chasity that pt needs another ultrasound PIV placed before starting the potassium.     0632 - Received critical lab regarding sodium. Sodium at 157. On-call hospitalist notified, and aware.       0645 - Called radiologist,  regarding pt's most recent abdominal x-ray for tube placement to see if cortrak is okay to use despite being coiled. He reported that 'the placement is not idea, ideally it would be in the duodenum.' 'Ideally, the cortrak would be retracted and advanced again, though we could start giving medications through the cortrak and see how the pt does.' [facility] supervisor Jermaine, notified of this. Passed this information along to dayshift RN.     0700- Contacted hospitalist regarding pt's meds via enteral route this morning. Provider aware and okay with holding enteral meds, due issues with tube placement at this  time.

## 2022-11-08 NOTE — PROGRESS NOTES
[facility] Medicine Daily Progress Note    Date of Service  11/8/2022    Chief Complaint  Found unresponsive    Hospital Course  Ashleigh Marquis is a 61 y.o. female with a history of breast cancer, chronic pain on oxycodone.  She was found unresponsive by a .  Paramedics arrived placed her on 15 L nonrebreather mask patient was tachycardic with heart rate in the 190s, she was hypothermic 93 °F.  Per report she was given Narcan and had some response.  Admitted 11/3/2022 with acute encephalopathy.  Labs here at the hospital showed acute kidney injury, hypokalemia, metabolic acidosis and concern of sepsis.  Patient was initially intubated placed on bicarb started on pressor support as well as broad-spectrum antibiotics of Zosyn and linezolid.  CT of the head showed by frontal subarachnoid hemorrhages.  MRA of the head and neck was negative for aneurysm.  The subarachnoid hemorrhage was felt due to be from trauma.  Patient was extubated 11/5 and weaned off of pressors.  Patient did grow out MSSA from her sputum antibiotics were adjusted to Unasyn.    Interval Problem Update      Unable to place NG tube despite multiple attempts.  cortrak placed present in stomach.  Patient with thick secretions did not pass swallow eval.  She is oriented to self only follows commands with prompting  Sodium 157 potassium 2.9 magnesium 1.3 phosphorus 1.8    I have discussed this patient's plan of care and discharge plan at IDT rounds today with Case Management, Nursing, Nursing leadership, and other members of the IDT team.    Consultants/Specialty  critical care    Code Status  DNAR/DNI    Disposition  Patient is not medically cleared for discharge.   Anticipate discharge to  be determined .  I have placed the appropriate orders for post-discharge needs.    Review of Systems  Review of Systems   Unable to perform ROS: Mental status change      Physical Exam  Temp:  [36.1 °C (97 °F)-36.6 °C (97.9 °F)] 36.1 °C (97  °F)  Pulse:  [] 85  Resp:  [18-19] 18  BP: (131-177)/() 167/91  SpO2:  [93 %-97 %] 94 %    Physical Exam  Vitals and nursing note reviewed.   Constitutional:       General: She is not in acute distress.  HENT:      Head: Normocephalic.      Nose: Nose normal. No rhinorrhea.      Comments: cortrak in place      Mouth/Throat:      Pharynx: No oropharyngeal exudate or posterior oropharyngeal erythema.   Eyes:      General: No scleral icterus.        Right eye: No discharge.         Left eye: No discharge.   Cardiovascular:      Rate and Rhythm: Normal rate and regular rhythm.      Heart sounds: Normal heart sounds. No murmur heard.    No friction rub. No gallop.   Pulmonary:      Effort: Pulmonary effort is normal. No respiratory distress.      Breath sounds: No stridor. Rhonchi present. No wheezing or rales.   Chest:      Chest wall: No tenderness.   Abdominal:      General: Bowel sounds are normal. There is no distension.      Palpations: Abdomen is soft. There is no mass.      Tenderness: There is no abdominal tenderness. There is no rebound.   Musculoskeletal:         General: No swelling or tenderness.      Cervical back: Neck supple. No rigidity.   Skin:     General: Skin is warm and dry.      Coloration: Skin is not cyanotic or jaundiced.      Nails: There is no clubbing.   Neurological:      General: No focal deficit present.      Mental Status: She is alert and oriented to person, place, and time.      Cranial Nerves: No cranial nerve deficit.      Motor: No weakness.   Psychiatric:         Mood and Affect: Mood normal.         Behavior: Behavior normal.       Fluids    Intake/Output Summary (Last 24 hours) at 11/8/2022 1419  Last data filed at 11/8/2022 1401  Gross per 24 hour   Intake 5367.98 ml   Output --   Net 5367.98 ml         Laboratory  Recent Labs     11/06/22  0530 11/07/22  0415 11/08/22  0530   WBC 17.6* 18.4* 15.1*   RBC 3.20* 3.35* 3.32*   HEMOGLOBIN 10.4* 10.8* 10.8*   HEMATOCRIT  29.6* 31.7* 32.0*   MCV 92.5 94.6 96.4   MCH 32.5 32.2 32.5   MCHC 35.1* 34.1 33.8   RDW 49.0 50.6* 52.7*   PLATELETCT 122* 124* 83*   MPV 11.1 11.6 12.0       Recent Labs     11/06/22  0530 11/07/22  0415 11/07/22  0907 11/07/22  2229 11/08/22  0215 11/08/22  0530   SODIUM 152* 159*   < > 160* 157* 157*   POTASSIUM 3.9 3.6  --   --  2.9*  --    CHLORIDE 117* 122*  --   --  118*  --    CO2 21 21  --   --  26  --    GLUCOSE 131* 116*  --   --  156*  --    * 69*  --   --  38*  --    CREATININE 2.69* 1.33  --   --  0.91  --    CALCIUM 9.2 9.6  --   --  9.4  --     < > = values in this interval not displayed.                     Imaging  DX-ABDOMEN FOR TUBE PLACEMENT   Final Result      1.  NG tube coiled in the fundus of the stomach.      DX-ABDOMEN FOR TUBE PLACEMENT   Final Result      Cortrak feeding tube coiled in fundus of stomach.      DX-ABDOMEN FOR TUBE PLACEMENT   Final Result      1.  Stable appearance of the enteric tube which is looped in the distal stomach and proximal duodenum with the tip directed cephalad at the GE junction.      DX-ABDOMEN FOR TUBE PLACEMENT   Final Result      Enteric feeding tube appears to be kinked in the expected location of the second portion of the duodenum with the tip terminating in the left upper quadrant, likely intragastric.      MR-MRA HEAD-W/O   Final Result         1.  Marked motion artifact but grossly normal MR angiogram of the [place].      MR-MRA NECK-W/O   Final Result      1.  Normal MR angiogram of the carotid arteries and vertebral basilar system..      DX-CHEST-PORTABLE (1 VIEW)   Final Result      1.  No acute cardiac or pulmonary abnormalities are identified.   2.  NG tube side-port projects at the gastroesophageal junction. Recommend advancing.      CT-HEAD W/O   Final Result      1.  BILATERAL frontal subarachnoid blood   2.  Atrophy      Based solely on CT findings, the brain injury guideline category is mBIG 2.      Nondisplaced skull fx    SDH 4.1-7.9mm   IPH 4.1-7.9mm   SAH 1 hemisphere, >3 sulci, 1-3mm      The original BIG retrospective analysis found radiographic progression in 0% of BIG 1 patients and 2.6% BIG 2.         Findings were communicated with and acknowledged by JEREMY M GONDA via [employer] on 11/4/2022 4:35 PM.      DX-ABDOMEN FOR TUBE PLACEMENT   Final Result      Enteric tube placement which appears intragastric.      DX-CHEST-PORTABLE (1 VIEW)   Final Result         1.  No acute cardiopulmonary disease.   2.  Trace left pleural effusion      DX-CHEST-PORTABLE (1 VIEW)   Final Result      1.  Tubes and lines in good position.      2.  11 mm nodule in left mid chest.      DX-CHEST-PORTABLE (1 VIEW)   Final Result      1.  No acute cardiopulmonary abnormality.   2.  15 mm left upper lung nodule which is nonspecific. Further evaluation with chest CT may be performed.   3.  No consolidation or pleural effusion.   4.  Nasogastric tube side port is at the GE junction. Recommend advancement.             Assessment/Plan  * Acute metabolic encephalopathy  Assessment & Plan  Possibly due to oxycodone overdose and SAH    Avoid sedating agents  Frequent orientation  PT OT SLP        Dysphagia  Assessment & Plan  Unable to place NG tube despite multiple attempts  Core track tube placed currently in stomach  Given multiple electrolyte abnormalities need for oral meds and nutrition benefits of using core track tube outweigh potential risks discussed with nursing staff.  SLP and aspiration precautions    Primary hypertension  Assessment & Plan  Increase amlodipine and monitor blood pressure    Hypernatremia  Assessment & Plan  Continue D5W and start free water flushes      Thrombocytopenia (HCC)  Assessment & Plan  No clinical signs of bleeding  Monitor CBC    Anemia  Assessment & Plan  Drop likely dilutional monitor    Diarrhea  Assessment & Plan  C diff was neg  abd exam benign  Imodium PRN    Hypophosphatemia  Assessment & Plan  Replete with IV  K-Phos    Subarachnoid hemorrhage (HCC)  Assessment & Plan  MRA head and neck was negative for aneurysm.         Aspiration pneumonia (HCC)  Assessment & Plan  MSSA on sputum culture  Completed antibiotic course    Hypomagnesemia  Assessment & Plan  Replete intravenously and monitor    Hypokalemia  Assessment & Plan  Replete and monitor    Acute kidney injury (HCC)  Assessment & Plan  Improved IV hydration and monitor    Sepsis (HCC)  Assessment & Plan  11/7 Wbc:18.4  Monitor vitals and labs  C/O aspiration pneumonia  11/4 Cdiff negative  11/3 Blood cultures no growth.  11/3 resp culture with MSSA  Completed antibiotic course for MSSA pneumonia monitor off antibiotics    Acute respiratory failure with hypoxia (HCC)  Assessment & Plan  Extubated 11/5  Completed antibiotic therapy for MSSA pneumonia  Aspiration precautions  RT protocol       VTE prophylaxis: SCDs/TEDs    I have performed a physical exam and reviewed and updated ROS and Plan today (11/8/2022). In review of yesterday's note (11/7/2022), there are no changes except as documented above.

## 2022-11-09 PROBLEM — N17.9 ACUTE KIDNEY INJURY (HCC): Status: RESOLVED | Noted: 2022-01-01 | Resolved: 2022-01-01

## 2022-11-09 NOTE — PROGRESS NOTES
Monitor Summary: SR/ST , KY 0.13, QRS 0.06, QT 0.35, with rare PVCs and rare bigeminy per strip from monitor room.

## 2022-11-09 NOTE — PROGRESS NOTES
Monitor Summary: SR 81-90, WA 0.12, QRS 0.07, QT 0.40, with rare PVCs per strip from monitor room.

## 2022-11-09 NOTE — CARE PLAN
The patient is Stable - Low risk of patient condition declining or worsening    Shift Goals  Clinical Goals: electrolyte replacement  Patient Goals: SHAI  Family Goals: SHAI    Progress made toward(s) clinical / shift goals:  Patient neuro has improved today, says short phrases and follows commands, replaced electrolytes, and Q2 turns. Patient is resting in bed.    Patient is not progressing towards the following goals:

## 2022-11-09 NOTE — PROGRESS NOTES
Late entry: Tube Team verified patient name and medical record number prior to tube placement.  Cortrak tube (43 inches, 10 Wolof) placed at 84 cm in left nare.  Per Cortrak picture, tube appears to be in the stomach.  Nursing Instructions: Awaiting KUB to confirm placement before use for medications or feeding. Once placement confirmed, flush tube with 30 ml of water, and then remove and save stylet, in patient medication drawer.

## 2022-11-09 NOTE — PROGRESS NOTES
Kenny from Lab called with critical result of Potassium 2.3 at 0534. Critical lab result read back to Kenny.   Dr. Flores notified of critical lab result at 0553.  Critical lab result read back by Dr. Flores.

## 2022-11-09 NOTE — CARE PLAN
The patient is Watcher - Medium risk of patient condition declining or worsening    Shift Goals  Clinical Goals: Maintain BP <140, neuro stable, maintain skin integrity  Patient Goals: SHAI  Family Goals: SHAI    Progress made toward(s) clinical / shift goals:    Problem: Skin Integrity  Goal: Skin integrity is maintained or improved  Outcome: Progressing   Q2hr turns, barrier paste applied to periarea to prevent breakdown    Problem: Fall Risk  Goal: Patient will remain free from falls  Outcome: Progressing   Bed alarm on, fall precautions in place.    Patient is not progressing towards the following goals:      Problem: Knowledge Deficit - Standard  Goal: Patient and family/care givers will demonstrate understanding of plan of care, disease process/condition, diagnostic tests and medications  Outcome: Not Progressing   Pt confused and not receptive to education at this time

## 2022-11-09 NOTE — PROGRESS NOTES
NOC HOSPITALIST CROSS COVER    Notified by RN regarding several electrolyte abnormalities.  Patient's potassium was 2.3, phosphorus 1.7, magnesium 1.5.  Patient also has a sodium of 143 which is down significantly from 11/18 at 0530 when it was 157.  Given the dramatic change in a decrease of 14 over a period of approximately 24 hours the patient's free water flushes and D5 were stopped.  An order was placed for a repeat CMP 4 hours after this result at 0830.      A/P:  #Overcorrection of hypernatremia  -Sodium decrease of 14 over a period of approximately 24 hours  -We will stop D5 and free water flushes  -Repeat CMP at 0830    #Hypokalemia  -40 mEq potassium oral Q 4-hour x 2 for total of 80 mEq    #Hypomagnesemia  -2 g IV magnesium    #Hypophosphatemia  -K-Phos 250 mg every 6 hours for 2 days    -----------------------------------------------------------------------------------------------------------    Electronically signed by:  MARGE Flores PA-C  Hospitalist Services

## 2022-11-09 NOTE — PROGRESS NOTES
0553 Dr Flores notified of drop of Na from 157 to 143 over 24hrs. Received order to stop D5W and free water flushes.

## 2022-11-09 NOTE — PROGRESS NOTES
[facility] Medicine Daily Progress Note    Date of Service  11/9/2022    Chief Complaint  Found unresponsive    Hospital Course  Ashleigh Marquis is a 61 y.o. female with a history of breast cancer, chronic pain on oxycodone.  She was found unresponsive by a .  Paramedics arrived placed her on 15 L nonrebreather mask patient was tachycardic with heart rate in the 190s, she was hypothermic 93 °F.  Per report she was given Narcan and had some response.  Admitted 11/3/2022 with acute encephalopathy.  Labs here at the hospital showed acute kidney injury, hypokalemia, metabolic acidosis and concern of sepsis.  Patient was initially intubated placed on bicarb started on pressor support as well as broad-spectrum antibiotics of Zosyn and linezolid.  CT of the head showed by frontal subarachnoid hemorrhages.  MRA of the head and neck was negative for aneurysm.  The subarachnoid hemorrhage was felt due to be from trauma.  Patient was extubated 11/5 and weaned off of pressors.  Patient did grow out MSSA from her sputum antibiotics were adjusted to Unasyn.    Interval Problem Update      Tolerating trickle feeding  Severe electrolyte abnormalities replete and potassium phosphorus magnesium  Afebrile on room air  Oriented to self    I have discussed this patient's plan of care and discharge plan at IDT rounds today with Case Management, Nursing, Nursing leadership, and other members of the IDT team.    Consultants/Specialty  critical care    Code Status  DNAR/DNI    Disposition  Patient is not medically cleared for discharge.   Anticipate discharge to  be determined .  I have placed the appropriate orders for post-discharge needs.    Review of Systems  Review of Systems   Unable to perform ROS: Mental status change      Physical Exam  Temp:  [36.4 °C (97.5 °F)-36.7 °C (98.1 °F)] 36.6 °C (97.9 °F)  Pulse:  [] 105  Resp:  [17-18] 18  BP: (100-174)/(56-91) 100/71  SpO2:  [95 %-97 %] 97 %    Physical Exam  Vitals  and nursing note reviewed.   Constitutional:       General: She is not in acute distress.  HENT:      Head: Normocephalic and atraumatic.      Nose: Nose normal. No rhinorrhea.      Comments: cortrak in place       Mouth/Throat:      Pharynx: No oropharyngeal exudate or posterior oropharyngeal erythema.   Eyes:      General: No scleral icterus.        Right eye: No discharge.         Left eye: No discharge.   Cardiovascular:      Rate and Rhythm: Normal rate and regular rhythm.      Heart sounds: Normal heart sounds. No murmur heard.    No friction rub. No gallop.   Pulmonary:      Effort: Pulmonary effort is normal. No respiratory distress.      Breath sounds: No stridor. Rhonchi present. No wheezing or rales.   Chest:      Chest wall: No tenderness.   Abdominal:      General: Bowel sounds are normal. There is no distension.      Palpations: Abdomen is soft. There is no mass.      Tenderness: There is no abdominal tenderness. There is no rebound.   Musculoskeletal:         General: Swelling present. No tenderness.      Cervical back: Neck supple. No rigidity.   Skin:     General: Skin is warm and dry.      Coloration: Skin is not cyanotic or jaundiced.      Nails: There is no clubbing.   Neurological:      General: No focal deficit present.      Mental Status: She is alert.      Cranial Nerves: No cranial nerve deficit.      Motor: No weakness.      Comments: Oriented to self intermittently follows commands with no focal deficits   Psychiatric:         Mood and Affect: Mood normal.         Behavior: Behavior normal.       Fluids    Intake/Output Summary (Last 24 hours) at 11/9/2022 1516  Last data filed at 11/9/2022 0943  Gross per 24 hour   Intake 3069.43 ml   Output 1250 ml   Net 1819.43 ml         Laboratory  Recent Labs     11/07/22  0415 11/08/22  0530 11/09/22  0437   WBC 18.4* 15.1* 13.5*   RBC 3.35* 3.32* 3.32*   HEMOGLOBIN 10.8* 10.8* 10.6*   HEMATOCRIT 31.7* 32.0* 31.9*   MCV 94.6 96.4 96.1   MCH 32.2  32.5 31.9   MCHC 34.1 33.8 33.2*   RDW 50.6* 52.7* 51.8*   PLATELETCT 124* 83* 57*   MPV 11.6 12.0 12.4       Recent Labs     11/08/22  0215 11/08/22  0530 11/08/22  2320 11/09/22  0437 11/09/22  1159   SODIUM 157*   < > 147* 143 142   POTASSIUM 2.9*  --   --  2.3* 2.7*   CHLORIDE 118*  --   --  107 105   CO2 26  --   --  26 27   GLUCOSE 156*  --   --  117* 95   BUN 38*  --   --  17 15   CREATININE 0.91  --   --  0.63 0.64   CALCIUM 9.4  --   --  8.4* 8.4*    < > = values in this interval not displayed.                     Imaging  DX-ABDOMEN FOR TUBE PLACEMENT   Final Result      1.  Feeding tube tip overlies the distal gastric body.      DX-ABDOMEN FOR TUBE PLACEMENT   Final Result      1.  NG tube coiled in the fundus of the stomach.      DX-ABDOMEN FOR TUBE PLACEMENT   Final Result      Cortrak feeding tube coiled in fundus of stomach.      DX-ABDOMEN FOR TUBE PLACEMENT   Final Result      1.  Stable appearance of the enteric tube which is looped in the distal stomach and proximal duodenum with the tip directed cephalad at the GE junction.      DX-ABDOMEN FOR TUBE PLACEMENT   Final Result      Enteric feeding tube appears to be kinked in the expected location of the second portion of the duodenum with the tip terminating in the left upper quadrant, likely intragastric.      MR-MRA HEAD-W/O   Final Result         1.  Marked motion artifact but grossly normal MR angiogram of the [place] of [place].      MR-MRA NECK-W/O   Final Result      1.  Normal MR angiogram of the carotid arteries and vertebral basilar system..      DX-CHEST-PORTABLE (1 VIEW)   Final Result      1.  No acute cardiac or pulmonary abnormalities are identified.   2.  NG tube side-port projects at the gastroesophageal junction. Recommend advancing.      CT-HEAD W/O   Final Result      1.  BILATERAL frontal subarachnoid blood   2.  Atrophy      Based solely on CT findings, the brain injury guideline category is mBIG 2.      Nondisplaced skull fx    SDH 4.1-7.9mm   IPH 4.1-7.9mm   SAH 1 hemisphere, >3 sulci, 1-3mm      The original BIG retrospective analysis found radiographic progression in 0% of BIG 1 patients and 2.6% BIG 2.         Findings were communicated with and acknowledged by JEREMY M GONDA via [employer] on 11/4/2022 4:35 PM.      DX-ABDOMEN FOR TUBE PLACEMENT   Final Result      Enteric tube placement which appears intragastric.      DX-CHEST-PORTABLE (1 VIEW)   Final Result         1.  No acute cardiopulmonary disease.   2.  Trace left pleural effusion      DX-CHEST-PORTABLE (1 VIEW)   Final Result      1.  Tubes and lines in good position.      2.  11 mm nodule in left mid chest.      DX-CHEST-PORTABLE (1 VIEW)   Final Result      1.  No acute cardiopulmonary abnormality.   2.  15 mm left upper lung nodule which is nonspecific. Further evaluation with chest CT may be performed.   3.  No consolidation or pleural effusion.   4.  Nasogastric tube side port is at the GE junction. Recommend advancement.             Assessment/Plan  * Acute metabolic encephalopathy  Assessment & Plan  Possibly due to oxycodone overdose and SAH    Avoid sedating agents  Frequent orientation  PT OT SLP  Will check MRI brain to rule out anoxic injury        Dysphagia  Assessment & Plan  Unable to place NG tube despite multiple attempts  Core track tube placed currently in stomach    SLP and aspiration precautions  Concern for refeeding syndrome slowly advance tube feeding  Continue thiamine and closely monitor electrolytes    Primary hypertension  Assessment & Plan  Controlled on amlodipine continue to monitor    Hypernatremia  Assessment & Plan  resolved      Thrombocytopenia (HCC)  Assessment & Plan  No clinical signs of bleeding  Platelets trending down we will continue to monitor      Anemia  Assessment & Plan  Hemoglobin stable no active bleeding monitor    Diarrhea  Assessment & Plan  C diff was neg  abd exam benign  Imodium PRN    Hypophosphatemia  Assessment &  Plan  Replete with IV K-Phos    Subarachnoid hemorrhage (HCC)  Assessment & Plan  MRA head and neck was negative for aneurysm.         Aspiration pneumonia (HCC)  Assessment & Plan  MSSA on sputum culture  Completed antibiotic course    Hypomagnesemia  Assessment & Plan  Replete intravenously and monitor    Hypokalemia  Assessment & Plan  Replete orally and intravenously and monitor electrolytes    Sepsis (HCC)  Assessment & Plan  11/7 Wbc:18.4  Monitor vitals and labs  C/O aspiration pneumonia  11/4 Cdiff negative  11/3 Blood cultures no growth.  11/3 resp culture with MSSA  Completed antibiotic course for MSSA pneumonia monitor off antibiotics    Sepsis resolved    Acute respiratory failure with hypoxia (HCC)  Assessment & Plan  Extubated 11/5  Completed antibiotic therapy for MSSA pneumonia  Aspiration precautions  RT protocol       VTE prophylaxis: SCDs/TEDs    I have performed a physical exam and reviewed and updated ROS and Plan today (11/9/2022). In review of yesterday's note (11/8/2022), there are no changes except as documented above.

## 2022-11-09 NOTE — THERAPY
Physical Therapy   Daily Treatment     Patient Name: Ashleigh Marquis  Age:  61 y.o., Sex:  female  Medical Record #: [id number]  Today's Date: 11/9/2022     Precautions  Precautions: Fall Risk;Swallow Precautions ( See Comments)  Comments: MAP >65 and SBP < 140 goals    Assessment    Pt greeted and demo'd openness to working with PT. Pt demo'd slow response time, slurred speech, incomprehensible sounds. Pt intermittently following 1 step cues. Pt performed AAROM ankle pumps and heel slides, pt would terminate task when assist not given. Full knee extension was slow to achieve. Upon attempt to assist pt to sit EOB, therapist notice pt lying in saturated bed due to rectal tube leak. Heavy cuing given for bed mobility tasks, pt unable to hold LE in positions to assist with shaista cares. Pt occasionally using UE for support on side rail to assist with log roll. Pt will continue to benefit from skilled PT to improve functional mobility.     Plan    Continue current treatment plan.    DC Equipment Recommendations: Unable to determine at this time  Discharge Recommendations: Recommend post-acute placement for additional physical therapy services prior to discharge home       11/09/22 0843   Supine Lower Body Exercise   Supine Lower Body Exercises Yes   Heel Slide 1 set of 10;Bilateral  (AAROM)   Ankle Pumps 1 set of 10;Bilateral  (AAROM)   Comments AAROM, slow to achieve full knee extension, pt would terminate task when not assisted.   Neurological Concerns   Neurological Concerns Yes   Comments given B frontal SAH   Balance   Weight Shift Sitting Fair   Comments focused on bed activity   Gait Analysis   Gait Level Of Assist Unable to Participate   Bed Mobility    Supine to Sit Unable to Participate   Rolling Maximal Assist to Rt.;Maximum Assist to Lt.   Skilled Intervention Verbal Cuing;Tactile Cuing;Sequencing;Postural Facilitation   Comments Pt not initiating coming to EOB, therapist began to give assit and notice pt  lying in saturated bed due to rectal tube leak, nurse notified and assist and cuing given for log roll and limb mgmt, pt intermittently implementing cues.   Functional Mobility   Comments focused on bed mobility today   Short Term Goals    Short Term Goal # 1 Pt will perform supine <> sit with SPV in 6 visits to return to PLOF   Goal Outcome # 1 goal not met   Short Term Goal # 2 Pt will perform STS tranfsers with FWW and SPV in 6 visits to improve functional independence   Goal Outcome # 2 Goal not met   Short Term Goal # 3 Pt will ambulate 50ft with FWW and CGA in 6 visits to initiate gait training   Goal Outcome # 3 Goal not met   Supervising Physical Therapist (PTA Treatments Only)   Supervising Physical Therapist Yanni Casarez

## 2022-11-10 PROBLEM — C50.919 METASTATIC BREAST CANCER: Status: ACTIVE | Noted: 2022-01-01

## 2022-11-10 NOTE — PROGRESS NOTES
Patient seen awake and resting comfortably in bed.   Unable to assess orientation due to aphasia.  Plan of care updated. Will continue to monitor labs.   No signs of pain noted.   Cortak noted to be clogged at beginning of shift. Clog Zapper used to unclog tube. Tube eventually unclogged, medications administered and tube feeding rate increased to 20 ml/hr around 2300.   Sacrum noted to be very red and excoriated.  Rectal tube in place however, some leakage noted. Pure wick also in place to assist with excoriation. Visco paste and ostomy powder ordered. Will place on excoriated areas when available.   Waffle mattress and q2 hour turns in place.  All needs addressed at this time.  Call light within reach, bed locked and in lowest position, room near nurses station, bed alarm on and hourly rounding in place.

## 2022-11-10 NOTE — PROGRESS NOTES
Monitor summary: SR/ST, HR , IA 0.12, QRS 0.08, QT 0.31 with (R)PVCs per strip from monitor room

## 2022-11-10 NOTE — PROGRESS NOTES
Hospital Medicine Daily Progress Note    Date of Service  11/10/2022    Chief Complaint  Found unresponsive    Hospital Course  Ashleigh Marquis is a 61 y.o. female with a history of breast cancer, chronic pain on oxycodone.  She was found unresponsive by a .  Paramedics arrived placed her on 15 L nonrebreather mask patient was tachycardic with heart rate in the 190s, she was hypothermic 93 °F.  Per report she was given Narcan and had some response.  Admitted 11/3/2022 with acute encephalopathy.  Labs here at the hospital showed acute kidney injury, hypokalemia, metabolic acidosis and concern of sepsis.  Patient was initially intubated placed on bicarb started on pressor support as well as broad-spectrum antibiotics of Zosyn and linezolid.  CT of the head showed by frontal subarachnoid hemorrhages.  MRA of the head and neck was negative for aneurysm.  The subarachnoid hemorrhage was felt due to be from trauma.  Patient was extubated 11/5 and weaned off of pressors.  Patient did grow out MSSA from her sputum antibiotics were adjusted to Unasyn.    Interval Problem Update      Patient tolerates tube feeding  Potassium 2.7 ordered IV and enteral replacement recheck 3.3  Platelets trending down no clinical signs of bleeding   Afebrile on room air  Called sister and updated her on patient's condition and plan of care and answered her questions  Overall prognosis guarded      I have discussed this patient's plan of care and discharge plan at IDT rounds today with Case Management, Nursing, Nursing leadership, and other members of the IDT team.    Consultants/Specialty  critical care    Code Status  DNAR/DNI    Disposition  Patient is not medically cleared for discharge.   Anticipate discharge to  be determined .  I have placed the appropriate orders for post-discharge needs.    Review of Systems  Review of Systems   Unable to perform ROS: Mental status change      Physical Exam  Temp:  [36.3 °C (97.3  °F)-36.7 °C (98.1 °F)] 36.7 °C (98.1 °F)  Pulse:  [] 118  Resp:  [17-18] 18  BP: (100-140)/(71-97) 140/97  SpO2:  [93 %-97 %] 96 %    Physical Exam  Vitals and nursing note reviewed.   Constitutional:       General: She is not in acute distress.  HENT:      Head: Normocephalic and atraumatic.      Comments: cortrak in place      Nose: Nose normal. No rhinorrhea.      Mouth/Throat:      Pharynx: No oropharyngeal exudate or posterior oropharyngeal erythema.   Eyes:      General: No scleral icterus.        Right eye: No discharge.         Left eye: No discharge.   Cardiovascular:      Rate and Rhythm: Regular rhythm. Tachycardia present.      Heart sounds: Normal heart sounds. No murmur heard.    No friction rub. No gallop.   Pulmonary:      Effort: Pulmonary effort is normal. No respiratory distress.      Breath sounds: No stridor. Rhonchi present. No wheezing or rales.   Chest:      Chest wall: No tenderness.   Abdominal:      General: There is no distension.      Palpations: Abdomen is soft. There is no mass.      Tenderness: There is no abdominal tenderness. There is no rebound.   Musculoskeletal:         General: No swelling or tenderness.      Cervical back: Neck supple. No rigidity.   Skin:     General: Skin is warm and dry.      Coloration: Skin is not cyanotic or jaundiced.      Nails: There is no clubbing.   Neurological:      General: No focal deficit present.      Mental Status: She is alert. She is disoriented.      Cranial Nerves: No cranial nerve deficit.      Motor: No weakness.      Comments: Patient intermittently follows commands no gross focal deficits noted   Psychiatric:         Cognition and Memory: Cognition is impaired.       Fluids    Intake/Output Summary (Last 24 hours) at 11/10/2022 1431  Last data filed at 11/10/2022 0929  Gross per 24 hour   Intake 855.8 ml   Output 3700 ml   Net -2844.2 ml         Laboratory  Recent Labs     11/08/22  0530 11/09/22  0437 11/10/22  0240   WBC 15.1*  13.5* 17.7*   RBC 3.32* 3.32* 3.76*   HEMOGLOBIN 10.8* 10.6* 11.7*   HEMATOCRIT 32.0* 31.9* 35.9*   MCV 96.4 96.1 95.5   MCH 32.5 31.9 31.1   MCHC 33.8 33.2* 32.6*   RDW 52.7* 51.8* 51.1*   PLATELETCT 83* 57* 48*   MPV 12.0 12.4 13.4*       Recent Labs     11/09/22  1159 11/10/22  0240 11/10/22  1309   SODIUM 142 145 146*   POTASSIUM 2.7* 2.7* 3.3*   CHLORIDE 105 106 110   CO2 27 26 23   GLUCOSE 95 83 90   BUN 15 16 16   CREATININE 0.64 0.76 0.68   CALCIUM 8.4* 8.6 8.4*       Recent Labs     11/10/22  0240   APTT <20.0*   INR 1.40*               Imaging  DX-CHEST-PORTABLE (1 VIEW)   Final Result      1.  Bilateral perihilar interstitial and early alveolar opacities, favoring edema over pneumonia. Still, pneumonia can be considered in the appropriate clinical settings.   2.  No lobar consolidation. No large pleural effusions.      DX-ABDOMEN FOR TUBE PLACEMENT   Final Result      1.  Feeding tube tip overlies the distal gastric body.      DX-ABDOMEN FOR TUBE PLACEMENT   Final Result      1.  NG tube coiled in the fundus of the stomach.      DX-ABDOMEN FOR TUBE PLACEMENT   Final Result      Cortrak feeding tube coiled in fundus of stomach.      DX-ABDOMEN FOR TUBE PLACEMENT   Final Result      1.  Stable appearance of the enteric tube which is looped in the distal stomach and proximal duodenum with the tip directed cephalad at the GE junction.      DX-ABDOMEN FOR TUBE PLACEMENT   Final Result      Enteric feeding tube appears to be kinked in the expected location of the second portion of the duodenum with the tip terminating in the left upper quadrant, likely intragastric.      MR-MRA HEAD-W/O   Final Result         1.  Marked motion artifact but grossly normal MR angiogram of the [place].      MR-MRA NECK-W/O   Final Result      1.  Normal MR angiogram of the carotid arteries and vertebral basilar system..      DX-CHEST-PORTABLE (1 VIEW)   Final Result      1.  No acute cardiac or pulmonary abnormalities are  identified.   2.  NG tube side-port projects at the gastroesophageal junction. Recommend advancing.      CT-HEAD W/O   Final Result      1.  BILATERAL frontal subarachnoid blood   2.  Atrophy      Based solely on CT findings, the brain injury guideline category is mBIG 2.      Nondisplaced skull fx   SDH 4.1-7.9mm   IPH 4.1-7.9mm   SAH 1 hemisphere, >3 sulci, 1-3mm      The original BIG retrospective analysis found radiographic progression in 0% of BIG 1 patients and 2.6% BIG 2.         Findings were communicated with and acknowledged by JEREMY M GONDA via [employer] on 11/4/2022 4:35 PM.      DX-ABDOMEN FOR TUBE PLACEMENT   Final Result      Enteric tube placement which appears intragastric.      DX-CHEST-PORTABLE (1 VIEW)   Final Result         1.  No acute cardiopulmonary disease.   2.  Trace left pleural effusion      DX-CHEST-PORTABLE (1 VIEW)   Final Result      1.  Tubes and lines in good position.      2.  11 mm nodule in left mid chest.      DX-CHEST-PORTABLE (1 VIEW)   Final Result      1.  No acute cardiopulmonary abnormality.   2.  15 mm left upper lung nodule which is nonspecific. Further evaluation with chest CT may be performed.   3.  No consolidation or pleural effusion.   4.  Nasogastric tube side port is at the GE junction. Recommend advancement.      MR-BRAIN-WITH & W/O    (Results Pending)          Assessment/Plan  * Acute metabolic encephalopathy  Assessment & Plan  Possibly due to oxycodone overdose and SAH    Avoid sedating agents  Frequent orientation  PT OT SLP  Will check MRI brain to rule out anoxic injury        Dysphagia  Assessment & Plan  Unable to place NG tube despite multiple attempts  Core track tube placed currently in stomach    SLP and aspiration precautions  Concern for refeeding syndrome slowly advance tube feeding  Continue thiamine and closely monitor electrolytes    Primary hypertension  Assessment & Plan  Controlled on amlodipine continue to  monitor    Hypernatremia  Assessment & Plan  resolved      Thrombocytopenia (HCC)  Assessment & Plan  No clinical signs of bleeding  Platelets trending down we will continue to monitor      Anemia  Assessment & Plan  Hemoglobin stable no active bleeding monitor    Diarrhea  Assessment & Plan  C diff was neg  abd exam benign  Imodium PRN    Hypophosphatemia  Assessment & Plan  Replete with IV K-Phos    Subarachnoid hemorrhage (HCC)  Assessment & Plan  MRA head and neck was negative for aneurysm.         Aspiration pneumonia (HCC)  Assessment & Plan  MSSA on sputum culture  Completed antibiotic course    Hypomagnesemia  Assessment & Plan  Replete intravenously and monitor    Hypokalemia  Assessment & Plan  Replete orally and intravenously and monitor electrolytes    Sepsis (HCC)  Assessment & Plan  11/7 Wbc:18.4  Monitor vitals and labs  C/O aspiration pneumonia  11/4 Cdiff negative  11/3 Blood cultures no growth.  11/3 resp culture with MSSA  Completed antibiotic course for MSSA pneumonia monitor off antibiotics    Sepsis resolved    Acute respiratory failure with hypoxia (HCC)  Assessment & Plan  Extubated 11/5  Completed antibiotic therapy for MSSA pneumonia  Aspiration precautions  RT protocol         VTE prophylaxis: SCDs/TEDs    I have performed a physical exam and reviewed and updated ROS and Plan today (11/10/2022). In review of yesterday's note (11/9/2022), there are no changes except as documented above.

## 2022-11-10 NOTE — PROCEDURES
VIDEO ELECTROENCEPHALOGRAM REPORT      Referring provider: Dr. Martinez    DOS:  11/10/22  (total recording of 21 minutes).     INDICATION:  Ashleigh Marquis 61 y.o. female presenting with SAH    CURRENT ANTIEPILEPTIC REGIMEN: none     TECHNIQUE: 30 channel video electroencephalogram (EEG) was performed in accordance with the international 10-20 system. The study was reviewed in bipolar and referential montages. The recording examined the patient during awake state only     DESCRIPTION OF THE RECORD:  During wakefulness, the background consisted of diffuse theta range slowing up to 6-7 Hz.No well-formed posterior dominant rhythm or anterior-posterior gradient was seen.  No drowsiness or sleep pattern seen.     ACTIVATION PROCEDURE:  hyperventilation was not performed    Intermittent Photic stimulation was performed in a stepwise fashion from 1 to 30 Hz and elicited no photic driving response.     ICTAL AND/OR INTERICTAL FINDINGS:   No focal or generalized epileptiform activity noted. No regional slowing was seen during this routine study.  No clinical events or seizures were reported or recorded during the study.     EKG: sampling of the EKG recording demonstrated sinus rhythm.     EVENTS: none     INTERPRETATION:    This is an abnormal video EEG recording in the awake state only The diffuse background slowing is consistent with mild to moderate moderate encephalopathy.  No epileptiform discharges or seizures were seen. This does not preclude a diagnosis of epilepsy.     If the clinical suspicion remains high for seizures, a prolonged recording to capture clinical or subclinical events may be helpful.      Jaime Israel MD  Diplomate in Neurology&Epilepsy  Office: [phone]  Fax: [phone]

## 2022-11-10 NOTE — THERAPY
"Physical Therapy   Daily Treatment     Patient Name: Ashleigh Marquis  Age:  61 y.o., Sex:  female  Medical Record #: [id number]  Today's Date: 11/10/2022     Precautions  Precautions: Fall Risk;Swallow Precautions ( See Comments)  Comments: MAP> 65 and SBP< 140    Assessment    Pt greeted and initially displayed to agree to therapy. Pt following cues for rolling for shaista cares, Yoni. Pt sat self up in bed twice, but refused sitting EOB with therapist, this was the one and only time the pt responded, pt nodded and said \"No\". Despite time and efforts pt did not agree to continue to participate in therapy. Pt will continue to benefit from skilled PT to improve functional mobility.     Plan    Continue current treatment plan.    DC Equipment Recommendations: Unable to determine at this time  Discharge Recommendations: Recommend post-acute placement for additional physical therapy services prior to discharge home       11/10/22 0942   Balance   Sitting Balance (Static) Fair   Sitting Balance (Dynamic) Fair   Weight Shift Sitting Fair   Comments pt pushed self up twice in bed   Gait Analysis   Gait Level Of Assist Unable to Participate   Bed Mobility    Supine to Sit Refused   Rolling Minimal Assist to Rt.;Minimum Assist to Lt.   Skilled Intervention Verbal Cuing;Tactile Cuing;Sequencing   Comments Pt following cues for rolling for pericares. Pt sat self up in bed twice, but refused sitting EOB with therapist, this was the one and only time the pt responded, pt nodded and said \"No\". Despite time and efforts pt did not agree.   Functional Mobility   Sit to Stand Refused   Short Term Goals    Short Term Goal # 1 Pt will perform supine <> sit with SPV in 6 visits to return to PLOF   Goal Outcome # 1 goal not met   Short Term Goal # 2 Pt will perform STS tranfsers with FWW and SPV in 6 visits to improve functional independence   Goal Outcome # 2 Goal not met   Short Term Goal # 3 Pt will ambulate 50ft with FWW and CGA in 6 " visits to initiate gait training   Goal Outcome # 3 Goal not met

## 2022-11-10 NOTE — THERAPY
Occupational Therapy  Daily Treatment     Patient Name: Ashleigh Marquis  Age:  61 y.o., Sex:  female  Medical Record #: [id number]  Today's Date: 11/10/2022       Precautions: Fall Risk, Swallow Precautions ( See Comments)      Assessment    Pt seen for OT tx. Continues to be limited by decreased activity tolerance, balance deficits and impaired initiation impacting ability to complete ADLs and ADL transfers independently. Required max A supine > sit d/t poor initiation. Max A seated grooming. Able to squeeze therapist fingers on command but no following of commands otherwise during session. Will continue to benefit from OT services while in house.     Plan    Continue current treatment plan.    DC Equipment Recommendations: Unable to determine at this time  Discharge Recommendations: Recommend post-acute placement for additional occupational therapy services prior to discharge home       11/10/22 1425   Cognition    Cognition / Consciousness X   Safety Awareness Impaired   New Learning Impaired   Attention Impaired   Comments pleasant, poor initiation   Active ROM Upper Body   Active ROM Upper Body  X   Comments unable to formally assess d/t poor command following, appears to have limited ROM d/t weakness   Strength Upper Body   Upper Body Strength  X   Gross Strength Generalized Weakness, Equal Bilaterally.    Balance   Sitting Balance (Static) Fair   Sitting Balance (Dynamic) Fair   Standing Balance (Static) Fair -   Standing Balance (Dynamic) Fair -   Weight Shift Sitting Fair   Weight Shift Standing Poor   Bed Mobility    Supine to Sit Maximal Assist   Sit to Supine Moderate Assist   Activities of Daily Living   Grooming Maximal Assist;Seated   Upper Body Dressing Maximal Assist   Lower Body Dressing Maximal Assist   Toileting Maximal Assist   Functional Mobility   Comments did not assess   Short Term Goals   Short Term Goal # 1 pt will demo toilet txf with supv   Goal Outcome # 1 Progressing slower than  expected   Short Term Goal # 2 pt will groom in stance at the sink with supv   Goal Outcome # 2 Progressing slower than expected   Short Term Goal # 3 pt will dress LB with supv   Goal Outcome # 3 Progressing slower than expected   Short Term Goal # 4 pt will follow 2 step direction 75% of the time   Goal Outcome # 4 Progressing slower than expected   Anticipated Discharge Equipment and Recommendations   DC Equipment Recommendations Unable to determine at this time   Discharge Recommendations Recommend post-acute placement for additional occupational therapy services prior to discharge home

## 2022-11-10 NOTE — PROGRESS NOTES
NOC HOSPITALIST CROSS COVER    Notified by RN regarding electrolyte abnormalities.  Potassium remains low at 2.7.  Magnesium remains low 1.5.  Potassium will be replaced with 40 mEq K-Dur now and additional 40 mEq K-Dur in 4 hours.  We will also give 3 g IV magnesium to assist with co-transport of potassium.    A/P:  #Hypokalemia/hypomagnesemia  -40 mEq K-Dur every 4 hours x2 doses  -3 g IV magnesium  -Repeat BMP at 0900  -----------------------------------------------------------------------------------------------------------    Electronically signed by:  MARGE Flores PA-C  Hospitalist Services

## 2022-11-10 NOTE — CARE PLAN
Problem: Knowledge Deficit - Standard  Goal: Patient and family/care givers will demonstrate understanding of plan of care, disease process/condition, diagnostic tests and medications  Outcome: Not Met     Problem: Skin Integrity  Goal: Skin integrity is maintained or improved  Outcome: Not Met     Problem: Pain - Standard  Goal: Alleviation of pain or a reduction in pain to the patient’s comfort goal  Outcome: Progressing     Problem: Fall Risk  Goal: Patient will remain free from falls  Outcome: Progressing   The patient is Stable - Low risk of patient condition declining or worsening    Shift Goals  Clinical Goals: stable labs, decrease stool, maintain skin integrtiy  Patient Goals: corina  Family Goals: corina    Progress made toward(s) clinical / shift goals:  fall precautions in place. N s/sx of pain    Patient is not progressing towards the following goals: bms, skin precautions in place. Unable to demonstrate understanding      Problem: Knowledge Deficit - Standard  Goal: Patient and family/care givers will demonstrate understanding of plan of care, disease process/condition, diagnostic tests and medications  Outcome: Not Met     Problem: Skin Integrity  Goal: Skin integrity is maintained or improved  Outcome: Not Met

## 2022-11-10 NOTE — PROGRESS NOTES
Monitor Summary: SR/ST , TX .08, QRS .07, QT .38 with rare pvc/pac per strip from monitor room

## 2022-11-10 NOTE — DISCHARGE PLANNING
TIFFANIE sent SNF referral to [facility] per KEVIN      0928    Agency/Facility Name: [facility]  Spoke To: García  Outcome: DPA received call from García at [facility]. Per García, facility will keep Pt as Pt has a rectal tube and cortrak.    VIVIW notified

## 2022-11-11 PROBLEM — R79.89 ELEVATED TROPONIN: Status: ACTIVE | Noted: 2022-01-01

## 2022-11-11 PROBLEM — R56.9 SEIZURE (HCC): Status: ACTIVE | Noted: 2022-01-01

## 2022-11-11 NOTE — THERAPY
Missed Therapy     Patient Name: Ashleigh Marquis  Age:  61 y.o., Sex:  female  Medical Record #: [id number]  Today's Date: 11/11/2022    Discussed missed therapy with nursing, ultrasound tech. Attempted cognitive linguistic evaluation this session, however, pt required echocardiogram. Will re-attempt when pt is available.        11/11/22 1015   Treatment Variance   Reason For Missed Therapy Medical - Patient  in Procedure  (Attempted cog/ling eval, pt requiring ECHO)

## 2022-11-11 NOTE — PROGRESS NOTES
0320: This RN and CNA in to change patient's bedding. Tube feeds stopped, patient laid flat and turned to right side. While cleaning up the patient, patient noted to have a right sided gaze. Patient not responding when name was called. Patient laid flat on back and noted to have posturing. Patient then began to show tonic clonic, seizure-like activity. Patient demonstrating repetitive jerking movements and muscle rigidity. Patient turned to side, placed on oxygen and suction provided for oral secretions. Episode lasted about 2 and a half minutes. BP stable after episode however, HR elevated and patient requiring 6L of oxygen via oxy mask.  ADRIENNE Cormier notified regarding episode.   PRN Ativan ordered, STAT head CT ordered, additional labs ordered.   Patient appeared more responsive and closer to baseline after about 20 minutes.    Patient transported down to CT and back to room. Patient still requiring oxygen. More oral suctioning provided. HR remained elevated (130s-145) Dangelo DELEON notified regarding HR. Orders received for STAT EKG.   Patient slowly improving as time passed. Continued to monitor HR and oxygen demand. IV fluids started and more free water administered.

## 2022-11-11 NOTE — DIETARY
Nutrition support weekly update:  Day 8 of admit.  Ashleigh Marquis is a 61 y.o. female with admitting DX of Narcotic overdose accidental or unintentional.      Tube feeding initiated on 11/4. Current TF via gastric cortrak is Novasource Renal @ goal rate of 35 mL/hr providing 1680 kcals, 76 grams protein and 605 mL free water.     Problem: Nutritional:  Goal: Nutrition support tolerated and meeting greater than 85% of estimated needs  Outcome: Met    Assessment:  Weight 75 kg via bed scale (11/9). Wt stable.   Re-estimate of nutritional needs is not indicated at this time.      Evaluation:   Pt previously on trickle feeds of 10 mL/hr with slow advancement of 10 ml q24 hrs. TF now @ goal of 35 mL/hr. Of note TF was paused this morning due to seizure. RD visually assessed TF now back on and running @ goal of 35 mL/hr.   Current clinical picture and MD progress notes reviewed. Pt extubated 11/5. NGT attempted 6 times 11/7. Pt to IR for NG tube placement.   Labs (11/11) Na 149 (H), Glu 138 (H),   Meds: D5 500 mL, keppra, K-phos, Kdur, thiamine, mag sulfate, KCL,   Skin: No noted pressure injuries   +BM 11/10  Due to refeeding labs and trickle feeds, recommend continuing with Novasource Renal @ current rate. If pt continues to tolerate and lytes WNL, recommend transitioning to fiber containing formula as pt is extubated and off sedation.     Malnutrition risk: No new criteria identified     Recommendations/Plan:  Continue Novasource Renal @ 35 mL/hr providing 1680 kcals, 76 grams protein and 605 mL free water.   Fluids per MD   Continue to monitor lytes for refeeding and replete as needed.     RD following

## 2022-11-11 NOTE — PROGRESS NOTES
Monitor summary:  - 120, HI 0.12, QRS 0.06, QT 0.31, with occasional PVC's per strip from monitor room.

## 2022-11-11 NOTE — PROGRESS NOTES
Hospital Medicine Daily Progress Note    Date of Service  11/11/2022    Chief Complaint  Found unresponsive    Hospital Course  Ashleigh Marquis is a 61 y.o. female with a history of breast cancer, chronic pain on oxycodone.  She was found unresponsive by a .  Paramedics arrived placed her on 15 L nonrebreather mask patient was tachycardic with heart rate in the 190s, she was hypothermic 93 °F.  Per report she was given Narcan and had some response.  Admitted 11/3/2022 with acute encephalopathy.  Labs here at the hospital showed acute kidney injury, hypokalemia, metabolic acidosis and concern of sepsis.  Patient was initially intubated placed on bicarb started on pressor support as well as broad-spectrum antibiotics of Zosyn and linezolid.  CT of the head showed by frontal subarachnoid hemorrhages.  MRA of the head and neck was negative for aneurysm.  The subarachnoid hemorrhage was felt due to be from trauma.  Patient was extubated 11/5 and weaned off of pressors.  Patient did grow out MSSA from her sputum antibiotics were adjusted to Unasyn.    Interval Problem Update    Patient had a seizure while being cleaned overnight  CT head stable  Remains encephalopathic  Potassium 2.9 IV and p.o. repletion ordered  Troponin 50 recheck troponin 46  Remains tachycardic on 3 L nasal cannula  Tube feeding at goal  Leukocytosis worse    I have discussed this patient's plan of care and discharge plan at IDT rounds today with Case Management, Nursing, Nursing leadership, and other members of the IDT team.    Consultants/Specialty  critical care    Code Status  DNAR/DNI    Disposition  Patient is not medically cleared for discharge.   Anticipate discharge to  be determined .  I have placed the appropriate orders for post-discharge needs.    Review of Systems  Review of Systems   Unable to perform ROS: Mental status change      Physical Exam  Temp:  [36.5 °C (97.7 °F)-36.9 °C (98.4 °F)] 36.7 °C (98.1 °F)  Pulse:   [116-139] 128  Resp:  [18] 18  BP: (106-153)/() 153/92  SpO2:  [90 %-97 %] 96 %    Physical Exam  Vitals and nursing note reviewed.   Constitutional:       General: She is not in acute distress.  HENT:      Head: Normocephalic and atraumatic.      Nose: No rhinorrhea.      Comments: cortrak in place      Mouth/Throat:      Pharynx: No oropharyngeal exudate or posterior oropharyngeal erythema.   Eyes:      General: No scleral icterus.        Right eye: No discharge.         Left eye: No discharge.   Cardiovascular:      Rate and Rhythm: Regular rhythm. Tachycardia present.      Heart sounds: Normal heart sounds. No murmur heard.    No friction rub. No gallop.   Pulmonary:      Effort: Pulmonary effort is normal. No respiratory distress.      Breath sounds: No stridor. Rhonchi and rales present. No wheezing.   Chest:      Chest wall: No tenderness.   Abdominal:      General: There is no distension.      Palpations: Abdomen is soft. There is no mass.      Tenderness: There is no abdominal tenderness. There is no rebound.   Musculoskeletal:         General: Swelling present. No tenderness.      Cervical back: Neck supple. No rigidity.   Skin:     General: Skin is warm and dry.      Coloration: Skin is not cyanotic or jaundiced.      Nails: There is no clubbing.   Neurological:      Mental Status: She is alert.      Cranial Nerves: No cranial nerve deficit.      Motor: No weakness.      Comments: Patient intermittently follows commands no gross focal deficits noted   Psychiatric:         Mood and Affect: Mood normal.         Behavior: Behavior normal.       Fluids    Intake/Output Summary (Last 24 hours) at 11/11/2022 1408  Last data filed at 11/11/2022 1150  Gross per 24 hour   Intake 1846.91 ml   Output 1000 ml   Net 846.91 ml         Laboratory  Recent Labs     11/09/22  0437 11/10/22  0240 11/11/22  0310   WBC 13.5* 17.7* 17.6*   RBC 3.32* 3.76* 3.76*   HEMOGLOBIN 10.6* 11.7* 11.9*   HEMATOCRIT 31.9* 35.9*  36.8*   MCV 96.1 95.5 97.9*   MCH 31.9 31.1 31.6   MCHC 33.2* 32.6* 32.3*   RDW 51.8* 51.1* 53.3*   PLATELETCT 57* 48* 86*   MPV 12.4 13.4* 12.9       Recent Labs     11/10/22  1309 11/11/22  0310 11/11/22  1236   SODIUM 146* 150* 149*   POTASSIUM 3.3* 2.9* 3.6   CHLORIDE 110 113* 113*   CO2 23 25 24   GLUCOSE 90 119* 138*   BUN 16 20 22   CREATININE 0.68 0.84 0.94   CALCIUM 8.4* 8.8 8.9       Recent Labs     11/10/22  0240   APTT <20.0*   INR 1.40*                 Imaging  EC-ECHOCARDIOGRAM COMPLETE W/ CONT         CT-HEAD W/O   Final Result      Decreased trace subarachnoid hemorrhage in the bilateral frontal vertices. No new acute intracranial hemorrhage.         DX-CHEST-PORTABLE (1 VIEW)   Final Result      1.  Bilateral perihilar interstitial and early alveolar opacities, favoring edema over pneumonia. Still, pneumonia can be considered in the appropriate clinical settings.   2.  No lobar consolidation. No large pleural effusions.      DX-ABDOMEN FOR TUBE PLACEMENT   Final Result      1.  Feeding tube tip overlies the distal gastric body.      DX-ABDOMEN FOR TUBE PLACEMENT   Final Result      1.  NG tube coiled in the fundus of the stomach.      DX-ABDOMEN FOR TUBE PLACEMENT   Final Result      Cortrak feeding tube coiled in fundus of stomach.      DX-ABDOMEN FOR TUBE PLACEMENT   Final Result      1.  Stable appearance of the enteric tube which is looped in the distal stomach and proximal duodenum with the tip directed cephalad at the GE junction.      DX-ABDOMEN FOR TUBE PLACEMENT   Final Result      Enteric feeding tube appears to be kinked in the expected location of the second portion of the duodenum with the tip terminating in the left upper quadrant, likely intragastric.      MR-MRA HEAD-W/O   Final Result         1.  Marked motion artifact but grossly normal MR angiogram of the [place].      MR-MRA NECK-W/O   Final Result      1.  Normal MR angiogram of the carotid arteries and vertebral  basilar system..      DX-CHEST-PORTABLE (1 VIEW)   Final Result      1.  No acute cardiac or pulmonary abnormalities are identified.   2.  NG tube side-port projects at the gastroesophageal junction. Recommend advancing.      CT-HEAD W/O   Final Result      1.  BILATERAL frontal subarachnoid blood   2.  Atrophy      Based solely on CT findings, the brain injury guideline category is mBIG 2.      Nondisplaced skull fx   SDH 4.1-7.9mm   IPH 4.1-7.9mm   SAH 1 hemisphere, >3 sulci, 1-3mm      The original BIG retrospective analysis found radiographic progression in 0% of BIG 1 patients and 2.6% BIG 2.         Findings were communicated with and acknowledged by JEREMY M GONDA via [employer] on 11/4/2022 4:35 PM.      DX-ABDOMEN FOR TUBE PLACEMENT   Final Result      Enteric tube placement which appears intragastric.      DX-CHEST-PORTABLE (1 VIEW)   Final Result         1.  No acute cardiopulmonary disease.   2.  Trace left pleural effusion      DX-CHEST-PORTABLE (1 VIEW)   Final Result      1.  Tubes and lines in good position.      2.  11 mm nodule in left mid chest.      DX-CHEST-PORTABLE (1 VIEW)   Final Result      1.  No acute cardiopulmonary abnormality.   2.  15 mm left upper lung nodule which is nonspecific. Further evaluation with chest CT may be performed.   3.  No consolidation or pleural effusion.   4.  Nasogastric tube side port is at the GE junction. Recommend advancement.      MR-BRAIN-WITH & W/O    (Results Pending)          Assessment/Plan  * Acute metabolic encephalopathy  Assessment & Plan  Possibly due to oxycodone overdose and SAH    Avoid sedating agents  Frequent orientation  PT OT SLP  EEG consistent with encephalopathy no seizures noted  Ammonia normal      Elevated troponin  Assessment & Plan  Likely demand ischemia  Check echocardiogram  Add metoprolol  Hold off on aspirin given SAH    Seizure (HCC)  Assessment & Plan  Patient will be started on Keppra  EEGrevealed encephalopathy no active  seizures  Given history of metastatic breast cancer we will check MRI    Metastatic breast cancer (HCC)  Assessment & Plan  Follow-up with oncology after discharge    Dysphagia  Assessment & Plan  Unable to place NG tube despite multiple attempts  Core track tube placed  in stomach    SLP and aspiration precautions  Concern for refeeding syndrome slowly advance tube feeding  Continue thiamine and closely monitor electrolytes    Primary hypertension  Assessment & Plan  Controlled on amlodipine continue to monitor    Hypernatremia  Assessment & Plan  Started on D5W continue free water flushes    Thrombocytopenia (HCC)  Assessment & Plan  Improved monitor CBC      Anemia  Assessment & Plan  Hemoglobin stable    Diarrhea  Assessment & Plan  C diff was neg  abd exam benign  Imodium PRN  Reviewed prior records under different MRN patient hospitalized at [facility] evaluated by GI Dr. Pate with EGD and colonoscopy pathology with lymphocytic infiltration felt to be secondary to her breast cancer therapy    Continue Imodium    Hypophosphatemia  Assessment & Plan  Replete and monitor    Subarachnoid hemorrhage (HCC)  Assessment & Plan  MRA head and neck was negative for aneurysm  Given persistent encephalopathy will check MRI brain        Aspiration pneumonia (HCC)  Assessment & Plan  Was started on Unasyn    Hypomagnesemia  Assessment & Plan  Replete and monitor    Hypokalemia  Assessment & Plan  Severe likely secondary to chronic GI losses  Continue aggressive repletion with IV KCl and close clinical monitoring    Sepsis (HCC)  Assessment & Plan  Chest11/7 Wbc:18.4  Monitor vitals and labs  C/O aspiration pneumonia  11/4 Cdiff negative  11/3 Blood cultures no growth.  11/3 resp culture with MSSA  Completed antibiotic course for MSSA pneumonia monitor off antibiotics    Given worsening leukocytosis and hypoxia will start on Unasyn for possible aspiration pneumonia    Acute respiratory failure with hypoxia  (HCC)  Assessment & Plan  Extubated 11/5  Previously completed antibiotic therapy for MSSA pneumonia  Given hypoxia and worsening leukocytosis we will start her on Unasyn for suspected aspiration pneumonia and check procalcitonin       VTE prophylaxis: SCDs/TEDs    I have performed a physical exam and reviewed and updated ROS and Plan today (11/11/2022). In review of yesterday's note (11/10/2022), there are no changes except as documented above.

## 2022-11-11 NOTE — ASSESSMENT & PLAN NOTE
Stable on  Keppra  EEGrevealed encephalopathy no active seizures  MRI?  Right frontal lobe lesion versus artifact  Repeat MRI with contrast when more clinically stable

## 2022-11-11 NOTE — PROGRESS NOTES
NOC HOSPITALIST CROSS COVER    Notified by RN that while the patient was having her bedding changed she had a seizure. Per RN patient gazed towards the ceiling before her limbs became stiff for a couple of seconds before she began to have tonic clonic movements. The patient has no history of seizures per primary RN.  The patient's seizure stopped after approximately 2 minutes without any intervention.  Patient seen and evaluated at bedside and does appear to be more confused than she did previously according to primary RN.  Patient returned to baseline mental status after approximately 20 minutes.  Stat head CT ordered as well as lactic acid and prolactin.  Stat head CT notable for decreased trace subarachnoid hemorrhage in the bilateral frontal vertices with no new acute intracranial hemorrhage.  Prolactin and lactic acid within normal limits at 19.6 and 2.0 respectively.  chem panel notable for multiple abnormalities including a sodium of 150, potassium 2.9, chloride 113, phosphorus 2.3, magnesium 1.7.  Will replace relevant electrolytes and rehydrate with D5 water at a rate of 50 mL an hour for 10 hours for total of 500 cc to slowly correct hypernatremia. Considered increasing free water flushes however per primary RN the patient did not tolerate free water flushes recently and had 1 episode of emesis following free water flushes.  Patient started on Keppra with a 1000 mg loading dose.  Recommend neurology evaluation in a.m. given new onset seizures.  Order also placed for as needed Ativan 2 g IV if the patient were to have another seizure.      A/P:  #Seizure-like activity  -RN witnessed muscle rigidity prior to tonic-clonic movement.  -Head CT shows improvement of previously known subarachnoid hemorrhage.  -Multiple electrolyte abnormalities will be corrected  -D5 water at a rate of 50 mL an hour for 10 hours total 500 mL  -Every 4 hours BMP  -1 g IV Keppra  -PRN Ativan IV 2 g if patient has recurrence of  seizure-like activity  -Prolactin, lactic acid within normal limits  -Recommend a.m. neurology consultation      Addendum: EKG ordered for persistent tachycardia.  EKG is notable for sinus tachycardia however the patient's heart rate does seem to be gradually improving.  EKG is also notable for mild ST depression in anterior and lateral leads which may be rate related however, a stat troponin will be ordered to rule out MI as the patient's mental status makes it difficult to evaluate if the patient is having chest pain.  -----------------------------------------------------------------------------------------------------------    Electronically signed by:  MARGE Flores PA-C  Hospitalist Services

## 2022-11-11 NOTE — CARE PLAN
Problem: Skin Integrity  Goal: Skin integrity is maintained or improved  Outcome: Not Met     Problem: Knowledge Deficit - Standard  Goal: Patient and family/care givers will demonstrate understanding of plan of care, disease process/condition, diagnostic tests and medications  Outcome: Not Progressing     Problem: Pain - Standard  Goal: Alleviation of pain or a reduction in pain to the patient’s comfort goal  Outcome: Progressing     Problem: Fall Risk  Goal: Patient will remain free from falls  Outcome: Progressing   The patient is Watcher - Medium risk of patient condition declining or worsening    Shift Goals  Clinical Goals: stable labs, maintain skin integrity  Patient Goals: corina  Family Goals: corina    Progress made toward(s) clinical / shift goals:  no pain at this time. Fall precautions in place    Patient is not progressing towards the following goals: Unable to understand poc      Problem: Knowledge Deficit - Standard  Goal: Patient and family/care givers will demonstrate understanding of plan of care, disease process/condition, diagnostic tests and medications  Outcome: Not Progressing     Problem: Skin Integrity  Goal: Skin integrity is maintained or improved  Outcome: Not Met

## 2022-11-11 NOTE — THERAPY
Physical Therapy   Daily Treatment     Patient Name: Ashleigh Marquis  Age:  61 y.o., Sex:  female  Medical Record #: [id number]  Today's Date: 11/11/2022     Precautions  Precautions: Fall Risk;Swallow Precautions ( See Comments)  Comments: MAP> 65 and SBP <140    Assessment    Pt greeted lying on side against bed rails. Therapist initiated PROM to ankles and knees to illicit a response, no response from pt or tracking with eyes, pt wide eyed, not blinking. Pt pushed self up to prop sitting in bed, therapist attempted to bring to EOB, pt returned to supine. Pt moving back to side lying and against bed rails with each attempt to bring pt sitting EOB. Pt continues to be limited by decreased activity tolerance and impaired initiation, pt continues to benefit from skilled PT to address these deficits.    Plan    Continue current treatment plan.    DC Equipment Recommendations: Unable to determine at this time  Discharge Recommendations: Recommend post-acute placement for additional physical therapy services prior to discharge home       11/11/22 0907   Supine Lower Body Exercise   Comments PROM of ankles and knees to try to ilicit response/participation, no response   Balance   Sitting Balance (Static) Fair   Sitting Balance (Dynamic) Fair   Comments pt pushed self up to sitting in bed   Gait Analysis   Gait Level Of Assist Unable to Participate   Bed Mobility    Comments pt not following any cues   Functional Mobility   Sit to Stand Unable to Participate   Short Term Goals    Short Term Goal # 1 Pt will perform supine <> sit with SPV in 6 visits to return to PLOF   Goal Outcome # 1 goal not met   Short Term Goal # 2 Pt will perform STS tranfsers with FWW and SPV in 6 visits to improve functional independence   Goal Outcome # 2 Goal not met   Short Term Goal # 3 Pt will ambulate 50ft with FWW and CGA in 6 visits to initiate gait training   Goal Outcome # 3 Goal not met

## 2022-11-11 NOTE — PROGRESS NOTES
Monitor summary: ST, -135, HI 0.11, QRS 0.07, QT 0.29 with (R)PVCs per strip from monitor room

## 2022-11-11 NOTE — ASSESSMENT & PLAN NOTE
Likely demand ischemia  Echocardiogram with normal EF no regional wall motion abnormalities and hyperdynamic LV     Continue metoprolol

## 2022-11-12 NOTE — PROGRESS NOTES
Monitor summary: -126, AR 0.12, QRS 0.07, QT 0.49, with rare PVCs per strip from monitor room.

## 2022-11-12 NOTE — PROGRESS NOTES
[facility] Medicine Daily Progress Note    Date of Service  11/12/2022    Chief Complaint  Found unresponsive    Hospital Course  Ashleigh Marquis is a 61 y.o. female with a history of breast cancer, chronic pain on oxycodone.  She was found unresponsive by a .  Paramedics arrived placed her on 15 L nonrebreather mask patient was tachycardic with heart rate in the 190s, she was hypothermic 93 °F.  Per report she was given Narcan and had some response.  Admitted 11/3/2022 with acute encephalopathy.  Labs here at the hospital showed acute kidney injury, hypokalemia, metabolic acidosis and concern of sepsis.  Patient was initially intubated placed on bicarb started on pressor support as well as broad-spectrum antibiotics of Zosyn and linezolid.  CT of the head showed by frontal subarachnoid hemorrhages.  MRA of the head and neck was negative for aneurysm.  The subarachnoid hemorrhage was felt due to be from trauma.  Patient was extubated 11/5 and weaned off of pressors.  Patient did grow out MSSA from her sputum antibiotics were adjusted to Unasyn.    Interval Problem Update    Remains confused pulled core track earlier  On my evaluation she is alert oriented to self denies pain  Afebrile on 3 L of oxygen  WBC 17.9 sodium 147 potassium 3.4    I have discussed this patient's plan of care and discharge plan at IDT rounds today with Case Management, Nursing, Nursing leadership, and other members of the IDT team.    Consultants/Specialty  critical care    Code Status  DNAR/DNI    Disposition  Patient is not medically cleared for discharge.   Anticipate discharge to  be determined .  I have placed the appropriate orders for post-discharge needs.    Review of Systems  Review of Systems   Unable to perform ROS: Mental status change      Physical Exam  Temp:  [36.6 °C (97.9 °F)-36.8 °C (98.3 °F)] 36.6 °C (97.9 °F)  Pulse:  [114-137] 137  Resp:  [17-20] 17  BP: ()/(60-95) 121/82  SpO2:  [93 %-96 %] 95  %    Physical Exam  Vitals and nursing note reviewed.   Constitutional:       General: She is not in acute distress.  HENT:      Head: Normocephalic and atraumatic.      Nose: Nose normal. No rhinorrhea.      Comments: cortrak in place      Mouth/Throat:      Pharynx: No oropharyngeal exudate or posterior oropharyngeal erythema.   Eyes:      General: No scleral icterus.        Right eye: No discharge.         Left eye: No discharge.   Cardiovascular:      Rate and Rhythm: Regular rhythm. Tachycardia present.      Heart sounds: Normal heart sounds. No murmur heard.    No friction rub. No gallop.   Pulmonary:      Effort: Pulmonary effort is normal. No respiratory distress.      Breath sounds: No stridor. Rhonchi present. No wheezing or rales.   Chest:      Chest wall: No tenderness.   Abdominal:      General: Bowel sounds are normal. There is no distension.      Palpations: Abdomen is soft. There is no mass.      Tenderness: There is no abdominal tenderness. There is no rebound.   Musculoskeletal:         General: Swelling present. No tenderness.      Cervical back: Neck supple. No rigidity.   Skin:     General: Skin is warm and dry.      Coloration: Skin is not cyanotic or jaundiced.      Nails: There is no clubbing.   Neurological:      General: No focal deficit present.      Mental Status: She is alert.      Cranial Nerves: No cranial nerve deficit.      Motor: No weakness.      Comments: Oriented to self  Intermittently follows commands moves all extremities spontaneously   Psychiatric:         Mood and Affect: Mood normal.         Behavior: Behavior normal.       Fluids    Intake/Output Summary (Last 24 hours) at 11/12/2022 1418  Last data filed at 11/12/2022 1314  Gross per 24 hour   Intake 3040.72 ml   Output 3100 ml   Net -59.28 ml         Laboratory  Recent Labs     11/10/22  0240 11/11/22  0310 11/12/22  0415   WBC 17.7* 17.6* 17.9*   RBC 3.76* 3.76* 4.02*   HEMOGLOBIN 11.7* 11.9* 12.6   HEMATOCRIT 35.9*  36.8* 39.9   MCV 95.5 97.9* 99.3*   MCH 31.1 31.6 31.3   MCHC 32.6* 32.3* 31.6*   RDW 51.1* 53.3* 54.5*   PLATELETCT 48* 86* 124*   MPV 13.4* 12.9 12.8       Recent Labs     11/11/22  0310 11/11/22  1236 11/12/22  0415   SODIUM 150* 149* 147*   POTASSIUM 2.9* 3.6 3.4*   CHLORIDE 113* 113* 113*   CO2 25 24 19*   GLUCOSE 119* 138* 139*   BUN 20 22 32*   CREATININE 0.84 0.94 1.23   CALCIUM 8.8 8.9 9.6       Recent Labs     11/10/22  0240   APTT <20.0*   INR 1.40*                 Imaging  EC-ECHOCARDIOGRAM COMPLETE W/ CONT   Final Result      CT-HEAD W/O   Final Result      Decreased trace subarachnoid hemorrhage in the bilateral frontal vertices. No new acute intracranial hemorrhage.         DX-CHEST-PORTABLE (1 VIEW)   Final Result      1.  Bilateral perihilar interstitial and early alveolar opacities, favoring edema over pneumonia. Still, pneumonia can be considered in the appropriate clinical settings.   2.  No lobar consolidation. No large pleural effusions.      DX-ABDOMEN FOR TUBE PLACEMENT   Final Result      1.  Feeding tube tip overlies the distal gastric body.      DX-ABDOMEN FOR TUBE PLACEMENT   Final Result      1.  NG tube coiled in the fundus of the stomach.      DX-ABDOMEN FOR TUBE PLACEMENT   Final Result      Cortrak feeding tube coiled in fundus of stomach.      DX-ABDOMEN FOR TUBE PLACEMENT   Final Result      1.  Stable appearance of the enteric tube which is looped in the distal stomach and proximal duodenum with the tip directed cephalad at the GE junction.      DX-ABDOMEN FOR TUBE PLACEMENT   Final Result      Enteric feeding tube appears to be kinked in the expected location of the second portion of the duodenum with the tip terminating in the left upper quadrant, likely intragastric.      MR-MRA HEAD-W/O   Final Result         1.  Marked motion artifact but grossly normal MR angiogram of the [place].      MR-MRA NECK-W/O   Final Result      1.  Normal MR angiogram of the carotid  arteries and vertebral basilar system..      DX-CHEST-PORTABLE (1 VIEW)   Final Result      1.  No acute cardiac or pulmonary abnormalities are identified.   2.  NG tube side-port projects at the gastroesophageal junction. Recommend advancing.      CT-HEAD W/O   Final Result      1.  BILATERAL frontal subarachnoid blood   2.  Atrophy      Based solely on CT findings, the brain injury guideline category is mBIG 2.      Nondisplaced skull fx   SDH 4.1-7.9mm   IPH 4.1-7.9mm   SAH 1 hemisphere, >3 sulci, 1-3mm      The original BIG retrospective analysis found radiographic progression in 0% of BIG 1 patients and 2.6% BIG 2.         Findings were communicated with and acknowledged by JEREMY M GONDA via [employer] on 11/4/2022 4:35 PM.      DX-ABDOMEN FOR TUBE PLACEMENT   Final Result      Enteric tube placement which appears intragastric.      DX-CHEST-PORTABLE (1 VIEW)   Final Result         1.  No acute cardiopulmonary disease.   2.  Trace left pleural effusion      DX-CHEST-PORTABLE (1 VIEW)   Final Result      1.  Tubes and lines in good position.      2.  11 mm nodule in left mid chest.      DX-CHEST-PORTABLE (1 VIEW)   Final Result      1.  No acute cardiopulmonary abnormality.   2.  15 mm left upper lung nodule which is nonspecific. Further evaluation with chest CT may be performed.   3.  No consolidation or pleural effusion.   4.  Nasogastric tube side port is at the GE junction. Recommend advancement.      MR-BRAIN-WITH & W/O    (Results Pending)          Assessment/Plan  * Acute metabolic encephalopathy  Assessment & Plan  Possibly due to oxycodone overdose and SAH    Avoid sedating agents  Frequent orientation  PT OT SLP  EEG consistent with encephalopathy no seizures noted  Ammonia normal  MRI pending    Elevated troponin  Assessment & Plan  Likely demand ischemia  Echocardiogram with normal EF no regional wall motion abnormalities and hyperdynamic LV  IV hydration  Continue metoprolol       Seizure  (HCC)  Assessment & Plan  Continue Keppra  EEGrevealed encephalopathy no active seizures  Given history of metastatic breast cancer we will check MRI    Metastatic breast cancer (HCC)  Assessment & Plan  Follow-up with oncology after discharge    Dysphagia  Assessment & Plan      SLP and aspiration precautions  Continue thiamine and monitor electrolytes  Replace NG and resume tube feeding    Primary hypertension  Assessment & Plan  Controlled on amlodipine continue to monitor    Hypernatremia  Assessment & Plan  Continue D5W and resume free water flushes via NG replaced    Thrombocytopenia (HCC)  Assessment & Plan  Improving no clinical signs of bleeding  Monitor CBC    Anemia  Assessment & Plan  Hemoglobin 12.6 stable    Diarrhea  Assessment & Plan  C diff was neg  abd exam benign  Imodium PRN  Reviewed prior records under different MRN patient hospitalized at [facility] evaluated by GI Dr. Pate with EGD and colonoscopy pathology with lymphocytic infiltration felt to be secondary to her breast cancer therapy    Continue Imodium    Subarachnoid hemorrhage (HCC)  Assessment & Plan  MRA head and neck was negative for aneurysm  Given persistent encephalopathy and history of malignancy MRI brain ordered and pending        Hypokalemia  Assessment & Plan  Severe likely secondary to chronic GI losses    Replete and monitor electrolytes    Sepsis ([facility])  Assessment & Plan  Chest11/7 Wbc:18.4  Monitor vitals and labs  C/O aspiration pneumonia  11/4 Cdiff negative  11/3 Blood cultures no growth.  11/3 resp culture with MSSA  Completed antibiotic course for MSSA pneumonia monitor off antibiotics    De-escalate antibiotics to Augmentin for aspiration pneumonia    Acute respiratory failure with hypoxia ([facility])  Assessment & Plan  Extubated 11/5  Previously completed antibiotic therapy for MSSA pneumonia  Given hypoxia and worsening leukocytosis started on Unasyn on 11/11/2022 for suspected aspiration pneumonia    Procalcitonin is normal will de-escalate to Augmentin once NG tube is replaced and complete 5-day course       VTE prophylaxis: SCDs/TEDs    I have performed a physical exam and reviewed and updated ROS and Plan today (11/12/2022). In review of yesterday's note (11/11/2022), there are no changes except as documented above.

## 2022-11-12 NOTE — CARE PLAN
The patient is Watcher - Medium risk of patient condition declining or worsening    Shift Goals  Clinical Goals: stable labs, maintain skin integrity  Patient Goals: corina  Family Goals: corina    Progress made toward(s) clinical / shift goals:      Problem: Skin Integrity  Goal: Skin integrity is maintained or improved  Outcome: Progressing     Problem: Fall Risk  Goal: Patient will remain free from falls  Outcome: Progressing       Patient is not progressing towards the following goals:      Problem: Knowledge Deficit - Standard  Goal: Patient and family/care givers will demonstrate understanding of plan of care, disease process/condition, diagnostic tests and medications  Outcome: Progressing

## 2022-11-13 NOTE — PROGRESS NOTES
Assumed care of patient at 0700. Pt A/Ox 3 at this time. NG tube placed overnight was clogged at beginning of shift. All measures exhausted attempting to unclog it. Clog zapper, coca cola, and islet all unsuccessful. Tube removed. Attempt to place a new NG tube also unsuccessful meeting resistance and pt not cooperating. SLP called to reassess patient as she had not been seen since 11/8. Pt tolerating ice chips and thin liquids. Fees scheduled for tomorrow.

## 2022-11-13 NOTE — PROGRESS NOTES
[facility] Medicine Daily Progress Note    Date of Service  11/13/2022    Chief Complaint  Found unresponsive    Hospital Course  Ashleigh Marquis is a 61 y.o. female with a history of breast cancer, chronic pain on oxycodone.  She was found unresponsive by a .  Paramedics arrived placed her on 15 L nonrebreather mask patient was tachycardic with heart rate in the 190s, she was hypothermic 93 °F.  Per report she was given Narcan and had some response.  Admitted 11/3/2022 with acute encephalopathy.  Labs here at the hospital showed acute kidney injury, hypokalemia, metabolic acidosis and concern of sepsis.  Patient was initially intubated placed on bicarb started on pressor support as well as broad-spectrum antibiotics of Zosyn and linezolid.  CT of the head showed by frontal subarachnoid hemorrhages.  MRA of the head and neck was negative for aneurysm.  The subarachnoid hemorrhage was felt due to be from trauma.  Patient was extubated 11/5 and weaned off of pressors.  Patient did grow out MSSA from her sputum antibiotics were adjusted to Unasyn.    Interval Problem Update    Patient is alert she is oriented to self and place on my examination  She follows commands  She denies pain  NG clogged will need to be replaced  Serum creatinine 2.5   WBC 19.4  Afebrile on 3 L of oxygen    I have discussed this patient's plan of care and discharge plan at IDT rounds today with Case Management, Nursing, Nursing leadership, and other members of the IDT team.    Consultants/Specialty  critical care    Code Status  DNAR/DNI    Disposition  Patient is not medically cleared for discharge.   Anticipate discharge to  be determined .  I have placed the appropriate orders for post-discharge needs.    Review of Systems  Review of Systems   Unable to perform ROS: Mental status change      Physical Exam  Temp:  [36.4 °C (97.5 °F)-36.8 °C (98.2 °F)] 36.8 °C (98.2 °F)  Pulse:  [116-141] 125  Resp:  [18] 18  BP:  (118-125)/(82-87) 125/87  SpO2:  [92 %-97 %] 94 %    Physical Exam  Vitals and nursing note reviewed.   Constitutional:       General: She is not in acute distress.  HENT:      Head: Normocephalic and atraumatic.      Nose: Nose normal. No rhinorrhea.      Mouth/Throat:      Pharynx: No oropharyngeal exudate or posterior oropharyngeal erythema.   Eyes:      General: No scleral icterus.        Right eye: No discharge.         Left eye: No discharge.   Cardiovascular:      Rate and Rhythm: Regular rhythm. Tachycardia present.      Heart sounds: Normal heart sounds. No murmur heard.    No friction rub. No gallop.      Comments: Port accessed  Pulmonary:      Effort: Pulmonary effort is normal. No respiratory distress.      Breath sounds: No stridor. Rhonchi present. No wheezing or rales.   Chest:      Chest wall: No tenderness.   Abdominal:      General: Bowel sounds are normal. There is no distension.      Palpations: Abdomen is soft. There is no mass.      Tenderness: There is no abdominal tenderness. There is no rebound.   Musculoskeletal:         General: Swelling present. No tenderness.      Cervical back: Neck supple. No rigidity.   Skin:     General: Skin is warm and dry.      Coloration: Skin is not cyanotic or jaundiced.      Nails: There is no clubbing.   Neurological:      General: No focal deficit present.      Mental Status: She is alert.      Cranial Nerves: No cranial nerve deficit.      Motor: No weakness.      Comments: Oriented x2   Psychiatric:         Mood and Affect: Mood normal.         Behavior: Behavior normal.       Fluids    Intake/Output Summary (Last 24 hours) at 11/13/2022 1348  Last data filed at 11/13/2022 1347  Gross per 24 hour   Intake 3826.23 ml   Output 1900 ml   Net 1926.23 ml         Laboratory  Recent Labs     11/11/22  0310 11/12/22  0415 11/13/22  0240   WBC 17.6* 17.9* 19.4*   RBC 3.76* 4.02* 4.09*   HEMOGLOBIN 11.9* 12.6 12.9   HEMATOCRIT 36.8* 39.9 40.9   MCV 97.9* 99.3*  100.0*   MCH 31.6 31.3 31.5   MCHC 32.3* 31.6* 31.5*   RDW 53.3* 54.5* 55.8*   PLATELETCT 86* 124* 174   MPV 12.9 12.8 12.4       Recent Labs     11/12/22  2145 11/13/22  0240 11/13/22  0605   SODIUM 149* 148* 147*   POTASSIUM 3.8 3.9 3.8   CHLORIDE 115* 113* 113*   CO2 18* 16* 16*   GLUCOSE 151* 156* 125*   BUN 47* 50* 52*   CREATININE 2.12* 2.38* 2.50*   CALCIUM 9.5 9.5 9.6                       Imaging  US-RENAL   Final Result      1.  Normal renal ultrasound.      DX-ABDOMEN FOR TUBE PLACEMENT   Final Result         Gastric drainage tube with tip projecting over the expected area of the first portion duodenum.      MR-BRAIN-WITH & W/O   Final Result      1.  Trace BILATERAL subarachnoid blood redemonstrated without appreciable change   2.  Possible RIGHT frontal lobe lesion with internal hemorrhage though I suspect this is imaging artifact related to motion. A metastatic lesion could have this appearance. Recommend repeating the contrast enhanced sequences when the patient may be    sedated or otherwise better able to remain still.   3.  Atrophy   4.  No evidence of acute ischemia, suggestion of other mass or other hemorrhage      EC-ECHOCARDIOGRAM COMPLETE W/ CONT   Final Result      CT-HEAD W/O   Final Result      Decreased trace subarachnoid hemorrhage in the bilateral frontal vertices. No new acute intracranial hemorrhage.         DX-CHEST-PORTABLE (1 VIEW)   Final Result      1.  Bilateral perihilar interstitial and early alveolar opacities, favoring edema over pneumonia. Still, pneumonia can be considered in the appropriate clinical settings.   2.  No lobar consolidation. No large pleural effusions.      DX-ABDOMEN FOR TUBE PLACEMENT   Final Result      1.  Feeding tube tip overlies the distal gastric body.      DX-ABDOMEN FOR TUBE PLACEMENT   Final Result      1.  NG tube coiled in the fundus of the stomach.      DX-ABDOMEN FOR TUBE PLACEMENT   Final Result      Cortrak feeding tube coiled in fundus of  stomach.      DX-ABDOMEN FOR TUBE PLACEMENT   Final Result      1.  Stable appearance of the enteric tube which is looped in the distal stomach and proximal duodenum with the tip directed cephalad at the GE junction.      DX-ABDOMEN FOR TUBE PLACEMENT   Final Result      Enteric feeding tube appears to be kinked in the expected location of the second portion of the duodenum with the tip terminating in the left upper quadrant, likely intragastric.      MR-MRA HEAD-W/O   Final Result         1.  Marked motion artifact but grossly normal MR angiogram of the [place].      MR-MRA NECK-W/O   Final Result      1.  Normal MR angiogram of the carotid arteries and vertebral basilar system..      DX-CHEST-PORTABLE (1 VIEW)   Final Result      1.  No acute cardiac or pulmonary abnormalities are identified.   2.  NG tube side-port projects at the gastroesophageal junction. Recommend advancing.      CT-HEAD W/O   Final Result      1.  BILATERAL frontal subarachnoid blood   2.  Atrophy      Based solely on CT findings, the brain injury guideline category is mBIG 2.      Nondisplaced skull fx   SDH 4.1-7.9mm   IPH 4.1-7.9mm   SAH 1 hemisphere, >3 sulci, 1-3mm      The original BIG retrospective analysis found radiographic progression in 0% of BIG 1 patients and 2.6% BIG 2.         Findings were communicated with and acknowledged by JEREMY M GONDA via [employer] on 11/4/2022 4:35 PM.      DX-ABDOMEN FOR TUBE PLACEMENT   Final Result      Enteric tube placement which appears intragastric.      DX-CHEST-PORTABLE (1 VIEW)   Final Result         1.  No acute cardiopulmonary disease.   2.  Trace left pleural effusion      DX-CHEST-PORTABLE (1 VIEW)   Final Result      1.  Tubes and lines in good position.      2.  11 mm nodule in left mid chest.      DX-CHEST-PORTABLE (1 VIEW)   Final Result      1.  No acute cardiopulmonary abnormality.   2.  15 mm left upper lung nodule which is nonspecific. Further evaluation with chest CT may  be performed.   3.  No consolidation or pleural effusion.   4.  Nasogastric tube side port is at the GE junction. Recommend advancement.             Assessment/Plan  * Acute metabolic encephalopathy  Assessment & Plan  Possibly due to oxycodone overdose and SAH    EEG consistent with encephalopathy no seizures noted  Ammonia normal  MRI trace bilateral subarachnoid blood and possible right frontal lobe lesion per radiologist suspected to be artifact secondary to motion    Avoid sedating agents frequent orientation  Consider repeating MRI with contrast when more clinically stable    Elevated troponin  Assessment & Plan  Likely demand ischemia  Echocardiogram with normal EF no regional wall motion abnormalities and hyperdynamic LV  IV hydration  Continue metoprolol       Seizure (HCC)  Assessment & Plan  Continue Keppra  EEGrevealed encephalopathy no active seizures  MRI?  Right frontal lobe lesion versus artifact  Repeat MRI with contrast when more clinically stable    Metastatic breast cancer (HCC)  Assessment & Plan  Follow-up with oncology after discharge    Dysphagia  Assessment & Plan      SLP and aspiration precautions  Continue thiamine and monitor electrolytes  Replace NG and resume tube feeding    Primary hypertension  Assessment & Plan  Controlled on amlodipine continue to monitor    Hypernatremia  Assessment & Plan  Continue D5W  Replace NG and resume free water flushes    Thrombocytopenia (HCC)  Assessment & Plan  Resolved    Anemia  Assessment & Plan  Hemoglobin stable    Diarrhea  Assessment & Plan  C diff was neg  abd exam benign  Imodium PRN  Reviewed prior records under different MRN patient hospitalized at [facility] evaluated by GI Dr. Pate with EGD and colonoscopy pathology with lymphocytic infiltration felt to be secondary to her breast cancer therapy    Continue Imodium    Subarachnoid hemorrhage (HCC)  Assessment & Plan  MRA head and neck was negative for aneurysm  Likely traumatic only  trace blood noted on MRI        Hypokalemia  Assessment & Plan  Severe likely secondary to chronic GI losses    Improved continue to monitor and replete accordingly    AYDEE (acute kidney injury) (HCC)  Assessment & Plan  Worsening serum creatinine suspect prerenal  Continue IV hydration  Will give 1 L LR bolus  Renal ultrasound ordered and reviewed with no hydronephrosis  Check urine sodium  Enteric bicarb for metabolic acidosis    Sepsis (HCC)  Assessment & Plan  Chest11/7 Wbc:18.4  Monitor vitals and labs  C/O aspiration pneumonia  11/4 Cdiff negative  11/3 Blood cultures no growth.  11/3 resp culture with MSSA  Completed antibiotic course for MSSA pneumonia monitor off antibiotics    On Unasyn for suspected aspiration pneumonia    Acute respiratory failure with hypoxia (HCC)  Assessment & Plan  Extubated 11/5  Previously completed antibiotic therapy for MSSA pneumonia  Given hypoxia and worsening leukocytosis started on Unasyn on 11/11/2022 for suspected aspiration pneumonia   Continue Unasyn and monitor clinically       VTE prophylaxis: SCDs/TEDs    I have performed a physical exam and reviewed and updated ROS and Plan today (11/13/2022). In review of yesterday's note (11/12/2022), there are no changes except as documented above.

## 2022-11-13 NOTE — CARE PLAN
Problem: Pain - Standard  Goal: Alleviation of pain or a reduction in pain to the patient’s comfort goal  Outcome: Progressing     Problem: Skin Integrity  Goal: Skin integrity is maintained or improved  Outcome: Progressing   The patient is Stable - Low risk of patient condition declining or worsening    Shift Goals  Clinical Goals: maintain skin integrity, comfort  Patient Goals: corina  Family Goals: corina    Progress made toward(s) clinical / shift goals:  remained free of pain and maintained skin integrity, weaned O2, placed NG tube    Patient is not progressing towards the following goals:

## 2022-11-13 NOTE — CARE PLAN
The patient is Watcher - Medium risk of patient condition declining or worsening    Shift Goals  Clinical Goals: Maintain skin integrity, comfort  Patient Goals: corina  Family Goals: corina    Progress made toward(s) clinical / shift goals:  q2hour turns in place.     Patient is not progressing towards the following goals:      Problem: Knowledge Deficit - Standard  Goal: Patient and family/care givers will demonstrate understanding of plan of care, disease process/condition, diagnostic tests and medications  Outcome: Not Progressing

## 2022-11-13 NOTE — THERAPY
"Speech Language Pathology  Daily Treatment     Patient Name: Ashleigh Marquis  Age:  61 y.o., Sex:  female  Medical Record #: [id number]  Today's Date: 11/13/2022     Precautions  Precautions: Fall Risk, Swallow Precautions ( See Comments)  Comments: MAP> 65 and SBP <140    Assessment    The patient was seen on this date for a dysphagia treatment with trials of thin liquids, liquidized, pureed and regular textures. Patient with increased attempts to verbalize and tolerating ice chips per RN. The patient upright and eager for PO intake upon arrival of SLP. Patient able to self feed herself. She consumed 8 oz of thin liquids, 4 oz of liquidized and 4 oz of pureed textures with appropriate oral acceptance and containment. AP transit was coordinated and there was no anterior bolus loss noted. Vocal quality is aphonic but patient was whispering throughout the evaluation. Patient in agreement to participate in diagnostic FEES. Will cautiously initiate a full liquid diet in the interim given patient had no overt s/sx of aspiration with trials presented this date.     Recommendations  1.  Full liquid diet   2.  Swallowing Instructions & Precautions:   Oral Care: Q4h  3.  SLP to follow for diagnostic as able to participate        Plan    Continue current treatment plan.    Discharge Recommendations: Recommend post-acute placement for additional speech therapy services prior to discharge home     Objective       11/13/22 1420   Vitals   O2 Delivery Device None - Room Air   Pain 0 - 10 Group   Therapist Pain Assessment Post Activity Pain Same as Prior to Activity;Nurse Notified;0   Patient / Family Goals   Patient / Family Goal #1 \"That's good!\"   Goal #1 Outcome Progressing as expected   Short Term Goals   Short Term Goal # 1 Pt will consume pre-feeding trials with SLP and no overt s/sx concerning for aspiration.   Goal Outcome # 1 Goal met, new goal added   Short Term Goal # 1 B  Pt will consume full liquid diet without any " overt s/sx of aspiration   Education Group   Education Provided Dysphagia;Role of Speech Therapy   Dysphagia Patient Response Patient;Acceptance;Explanation;No Learning Evidence

## 2022-11-14 PROBLEM — D69.6 THROMBOCYTOPENIA (HCC): Status: RESOLVED | Noted: 2022-01-01 | Resolved: 2022-01-01

## 2022-11-14 PROBLEM — D64.9 ANEMIA: Status: RESOLVED | Noted: 2022-01-01 | Resolved: 2022-01-01

## 2022-11-14 NOTE — THERAPY
"Speech Language Pathology  Daily Treatment     Patient Name: Ashleigh Marquis  Age:  61 y.o., Sex:  female  Medical Record #: [id number]  Today's Date: 11/14/2022     Precautions  Precautions: Fall Risk, Swallow Precautions ( See Comments)  Comments: MAP> 65 and SBP <140    HPI: Pt is 61 y.o. female, found unresponsive at home by , reportedly cold and cyanotic on scene. Pt intubated in ED, central line placed. CSE 11/6.     Subjective  Pt agreeable and cooperative w/ SLP tx session. Repeatedly requested water, then stated \"It's not cold, I need ice.\"     Assessment  Pt demonstrated PO trials of 4 oz LQ3 applesauce and approx. 6 ox TN0 water. Pt demonstrated appropriate self-feeding given min A from SLP for set-up. Oral phase unremarkable with LQ, min bilateral anterior loss with cup sips TN0. Some impulsivity noted with large bolus size and rapid rate of intake. One swallow appreciated per bolus given trials of LQ3. Of note - pt reported \"it hurts\" while eating LQ3 trials, did not specify if pain was associated with PO. Use of TN0 liquid wash alleviated complaints of pain. Give 6oz TN0, cough response appreciated x5, with prolonged cough response x1/5, all concerning for airway invasion. Vocal quality continues to be whisper quality or aphonic with mouthing only. Pt did not recall previous SLP visit and discussion of FEES, agreeable to procedure following SLP education and finger \"scope.\" FEES scheduled for this date at 1:30 pm.    Clinical Impressions  Pt continues to present with concern for oropahyngeal dysphagia, given cough response with thins, impulsivity with feeding, possible pain with PO, and whisper-quality voice. Recommend continuation of current diet with changes pending FEES. Pt appears ready to participate in diagnostic evaluation.      Recommendations  1.  Full liquid pending FEES  2.  Swallowing Instructions & Precautions:   Supervision: Direct supervision during meals  Positioning: " "Fully upright and midline during oral intake  Medication: Crush with applesauce or puree, as appropriate  Strategies: Small bites/sips, Slow rate of intake  Oral Care: Q4h  3.  SLP will follow and make changes to recommendations and POC per results of FEES.       Plan    Continue current treatment plan.    Discharge Recommendations: Recommend post-acute placement for additional speech therapy services prior to discharge home       Objective   11/14/22 0850   Vitals   O2 Delivery Device None - Room Air   Patient / Family Goals   Patient / Family Goal #1 \"That's good!\"   Goal #1 Outcome Progressing as expected   Short Term Goals   Short Term Goal # 1 Pt will consume pre-feeding trials with SLP and no overt s/sx concerning for aspiration.   Goal Outcome # 1 Goal met, new goal added   Short Term Goal # 1 B  Pt will consume full liquid diet without any overt s/sx of aspiration   Goal Outcome  # 1 B Progressing as expected   Short Term Goal # 2 Pt will complete FEES to further assess swallow function and determine safety for PO.   Education Group   Education Provided Dysphagia;Role of Speech Therapy   Additional Comments Pt agreeable to FEES, will need reinforcemnet   Interdisciplinary Plan of Care Collaboration   Collaboration Comments RN updated     "

## 2022-11-14 NOTE — ASSESSMENT & PLAN NOTE
Continue Unasyn will complete 5-day course on 11/16/2022  Monitor CBC and follow-up on urinalysis and repeat cultures

## 2022-11-14 NOTE — TELEPHONE ENCOUNTER
Attempted to reach out to patient in regards to her no showing her prechemo and chemo appt on 11/11/22. The phone number on records goes to an automatic beep every time I attempted to call with no way to leave a message. I also attempted to call her daughter as listed but her phone was also unable to take calls at this time. Will try again at a later time.

## 2022-11-14 NOTE — THERAPY
"Speech Language Pathology   Initial Assessment     Patient Name: Ashleigh Marquis  AGE:  61 y.o., SEX:  female  Medical Record #: [id number]  Today's Date: 11/14/2022     Precautions  Precautions: Fall Risk, Swallow Precautions ( See Comments)  Comments: MAP> 65 and SBP <140    HPI: Pt is 61 y.o. female, found unresponsive at home by , reportedly cold and cyanotic on scene. Pt intubated in ED, central line placed. CSE 11/6.     CMHx: Acute metabolic encephalopathy, acute respiratory failure w/ hypoxia, sepsis, AYDEE, aspiration PNA, SAH, HTN, metastatic breast cancer, seizure, SHARRI?  PMHx: None on file    MRI Brain w/ and w/o 11/12:  \"1.  Trace BILATERAL subarachnoid blood redemonstrated without appreciable change  2.  Possible RIGHT frontal lobe lesion with internal hemorrhage though I suspect this is imaging artifact related to motion. A metastatic lesion could have this appearance. Recommend repeating the contrast enhanced sequences when the patient may be   sedated or otherwise better able to remain still.  3.  Atrophy  4.  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 She is independent normally, uses the bus to get to appts or friends will drive her sometimes. Per Emr- not employed.       Subjective  Pt agreeable and fairly cooperative with all SLP evaluation tasks. Poor attention throughout evaluation w/ pt repeatedly requesting water. Pt reports that her cognition is not sufficient for RTW or return to I w/ IADLs.       Assessment  The pt " was seen for a cognitive evaluation. Portions of the Cognistat and other informal measures were administered by this SLP. Results are as follows:     Cognistat  Orientation: Moderate  Attention: Mild  Comprehension: Average  Repetition: Mild   Suspect d/t attention deficits over language deficits  Naming: Average  Memory: Moderate  Calculations: Severe  Similarities: Severe   Did not answer, would repeat target without response given max cues  Judgment: Severe    Clock Drawing  Pt demonstrated very poor organization and planning with clock drawing task. Appeared to not understand the task at first, use of clock in room as visual cue assisted with task initiation, not performance. Clock drawing scored 3/13 per CLQT protocol, indicating severe impairment. Errors as follows: numbers written 9, 10, 11, 12, 5, 10; three equal length clock hands present, not numbers written on bottom half of clock.      Clinical Impressions  Pt presents with severe cognitive-linguistic impairment, evidenced by deficits in executive function, judgment, memory, and numerical function. Given severity of impairments, pt will benefit from skilled SLP services for cognition in the acute and post-acute settings and demonstrates need for direct A with all IADLs upon d/c. Additionally, given pt with consistent whisper-quality or aphonic voice, consideration should be made for voice therapy and for AAC to improve communication effectiveness.     Of note, cognitive-linguistic evaluations assess performance on various cognitive-linguistic domains such as expressive and receptive language, attention, memory, executive function, problem solving, etc. It is not within the scope of Speech Language Pathologists to determine capacity, please defer to medical team or psych for capacity evaluation. Thank you.         Recommendations  Supervision Needs Upon Discharge: The pt will benefit from direct assistance for the following IADLs: Medication management,  "Financial management, Appointment management, Household chores, Cooking.  2.   Consider trials of AAC to augment communication in the presence of aphonic voice      Plan    Recommend Speech Therapy 5 times per week until therapy goals are met for the following treatments:  Dysphagia Training, Cognitive-Linguistic Training, and Patient / Family / Caregiver Education.    Discharge Recommendations: Recommend post-acute placement for additional speech therapy services prior to discharge home       Objective   11/14/22 0904   Initial Contact Note    Initial Contact Note  Order Received and Verified, Speech Therapy Evaluation in Progress with Full Report to Follow.   Vitals   O2 Delivery Device None - Room Air   Prior Living Situation   Prior Services Home-Independent   Lives with - Patient's Self Care Capacity Alone and Able to Care For Self  (Pt reports \"daughter\")   Comments Per EMR: Katalina reports that she lives in low income housing in which they have a shared kitchen/living space. She is independent normally, uses the bus to get to appts or friends will drive her sometimes.   Prior Level Of Function   Communication Within Functional Limits   Swallow Within Functional Limits   Dentition Poor Quality    Patient's Primary Language English   Verbal Expression   Verbal Output Functional Minimal (4)   Repetition: Sentences Minimal (4)   Naming Within Functional Limits (6-7)   Auditory Comprehension   Identifies Pictures Within Functional Limits (6-7)   Follows Two Unit Commands Within Functional Limits (6-7)   Understands Simple, Structured Conversation  Minimal (4)   Reading Comprehension   Comments Needs further assessment   Written Expression   Functional Writing: Single Letter Within Functional Limits (6-7)   Overall Legibility Within Functional Limits (6-7)   Cognitive-Linguistic   Level of Consciousness Alert   Orientation Level Not Oriented to Age;Not Oriented to Year;Not Oriented to Day;Not Oriented to Reason " "  Sustained Attention Minimal (4)   Divided Attention Severe (2)   Short Term Memory Moderate (3)   Immediate Memory Minimal (4)   Simple Reasoning / Problem Solving Severe (2)   Bosque Reasoning Severe (2)   Abstract Reasoning Severe (2)   Insight into Deficits Severe (2)   Executive Functioning / Organization Profound (1)   Verbal Sequencing (Simple) Severe (2)   Functional Sequencing for ADL / IADLs Severe (2)   Auditory Math Severe (2)   Clock Drawing Omissions;Perseveration ;Impaired Hand Placement;Disorganization;Poor Planning;Numeric Errors   Patient / Family Goals   Patient / Family Goal #1 \"That's good!\"   Goal #1 Outcome Progressing as expected   Short Term Goals   Short Term Goal # 3 Pt will correctly answer open-set egocentric and orientation questions with 80% accuracy given min cues from SLP.   Short Term Goal # 4 Pt will remmeber functional safety information given five-minute delay with 80% accuracy given min cues from SLP.   Short Term Goal # 5 Pt will demonstrate appropriate problem solving/judgement given functional scenarios with 80% accuracy given min cues from SLP.   Education Group   Education Provided Traumatic Brain Injury / Cognitive-Linguistic   Additional Comments Limited learning evidence   Problem List   Problem List Dysphagia;Cognitive-Linguistic Deficits;Executive Function Deficit;Memory Deficit;Impaired Judgement;Numerical Function Deficit   Interdisciplinary Plan of Care Collaboration   Collaboration Comments RN updated on results and recommendations     "

## 2022-11-14 NOTE — PROGRESS NOTES
[facility] Medicine Daily Progress Note    Date of Service  11/14/2022    Chief Complaint  Found unresponsive    Hospital Course  Ashleigh Marquis is a 61 y.o. female with a history of breast cancer, chronic pain on oxycodone.  She was found unresponsive by a .  Paramedics arrived placed her on 15 L nonrebreather mask patient was tachycardic with heart rate in the 190s, she was hypothermic 93 °F.  Per report she was given Narcan and had some response.  Admitted 11/3/2022 with acute encephalopathy.  Labs here at the hospital showed acute kidney injury, hypokalemia, metabolic acidosis and concern of sepsis.  Patient was initially intubated placed on bicarb started on pressor support as well as broad-spectrum antibiotics of Zosyn and linezolid.  CT of the head showed by frontal subarachnoid hemorrhages.  MRA of the head and neck was negative for aneurysm.  The subarachnoid hemorrhage was felt due to be from trauma.  Patient was extubated 11/5 and weaned off of pressors.  Patient did grow out MSSA from her sputum antibiotics were adjusted to Unasyn.    Interval Problem Update    Started on clear liquid diet  SLP following with plans for fees  Alert oriented to self and place  Pulled IV earlier today  Denies pain  Afebrile on room air  Leukocytosis worse      I have discussed this patient's plan of care and discharge plan at IDT rounds today with Case Management, Nursing, Nursing leadership, and other members of the IDT team.    Consultants/Specialty  critical care    Code Status  DNAR/DNI    Disposition  Patient is not medically cleared for discharge.   Anticipate discharge to  be determined .  I have placed the appropriate orders for post-discharge needs.    Review of Systems  Review of Systems   Unable to perform ROS: Mental status change      Physical Exam  Temp:  [36.2 °C (97.2 °F)-36.8 °C (98.2 °F)] 36.6 °C (97.9 °F)  Pulse:  [107-129] 122  Resp:  [19-20] 19  BP: (105-124)/(75-88) 105/75  SpO2:  [91  %-97 %] 95 %    Physical Exam  Vitals and nursing note reviewed.   Constitutional:       General: She is not in acute distress.  HENT:      Head: Normocephalic and atraumatic.      Nose: Nose normal. No rhinorrhea.      Mouth/Throat:      Pharynx: No oropharyngeal exudate or posterior oropharyngeal erythema.   Eyes:      General: No scleral icterus.        Right eye: No discharge.         Left eye: No discharge.   Cardiovascular:      Rate and Rhythm: Regular rhythm. Tachycardia present.      Heart sounds: Normal heart sounds. No murmur heard.    No friction rub. No gallop.   Pulmonary:      Effort: Pulmonary effort is normal. No respiratory distress.      Breath sounds: No stridor. Rhonchi present. No rales.   Chest:      Chest wall: No tenderness.   Abdominal:      General: Bowel sounds are normal. There is no distension.      Palpations: Abdomen is soft. There is no mass.      Tenderness: There is no abdominal tenderness. There is no rebound.   Musculoskeletal:         General: Swelling present. No tenderness.      Cervical back: Neck supple. No rigidity.   Skin:     General: Skin is warm and dry.      Coloration: Skin is not cyanotic or jaundiced.      Nails: There is no clubbing.   Neurological:      General: No focal deficit present.      Mental Status: She is alert.      Cranial Nerves: No cranial nerve deficit.      Motor: No weakness.      Comments: Oriented to self and place   Psychiatric:         Speech: Speech is delayed.         Cognition and Memory: She exhibits impaired recent memory.       Fluids    Intake/Output Summary (Last 24 hours) at 11/14/2022 1414  Last data filed at 11/14/2022 1200  Gross per 24 hour   Intake --   Output 1200 ml   Net -1200 ml         Laboratory  Recent Labs     11/12/22  0415 11/13/22  0240 11/14/22  0740   WBC 17.9* 19.4* 20.9*   RBC 4.02* 4.09* 4.05*   HEMOGLOBIN 12.6 12.9 12.8   HEMATOCRIT 39.9 40.9 40.0   MCV 99.3* 100.0* 98.8*   MCH 31.3 31.5 31.6   MCHC 31.6* 31.5*  32.0*   RDW 54.5* 55.8* 53.9*   PLATELETCT 124* 174 192   MPV 12.8 12.4 12.1       Recent Labs     11/13/22  0240 11/13/22  0605 11/14/22  0740   SODIUM 148* 147* 147*   POTASSIUM 3.9 3.8 3.5*   CHLORIDE 113* 113* 111   CO2 16* 16* 16*   GLUCOSE 156* 125* 120*   BUN 50* 52* 63*   CREATININE 2.38* 2.50* 2.62*   CALCIUM 9.5 9.6 9.7                       Imaging  US-RENAL   Final Result      1.  Normal renal ultrasound.      DX-ABDOMEN FOR TUBE PLACEMENT   Final Result         Gastric drainage tube with tip projecting over the expected area of the first portion duodenum.      MR-BRAIN-WITH & W/O   Final Result      1.  Trace BILATERAL subarachnoid blood redemonstrated without appreciable change   2.  Possible RIGHT frontal lobe lesion with internal hemorrhage though I suspect this is imaging artifact related to motion. A metastatic lesion could have this appearance. Recommend repeating the contrast enhanced sequences when the patient may be    sedated or otherwise better able to remain still.   3.  Atrophy   4.  No evidence of acute ischemia, suggestion of other mass or other hemorrhage      EC-ECHOCARDIOGRAM COMPLETE W/ CONT   Final Result      CT-HEAD W/O   Final Result      Decreased trace subarachnoid hemorrhage in the bilateral frontal vertices. No new acute intracranial hemorrhage.         DX-CHEST-PORTABLE (1 VIEW)   Final Result      1.  Bilateral perihilar interstitial and early alveolar opacities, favoring edema over pneumonia. Still, pneumonia can be considered in the appropriate clinical settings.   2.  No lobar consolidation. No large pleural effusions.      DX-ABDOMEN FOR TUBE PLACEMENT   Final Result      1.  Feeding tube tip overlies the distal gastric body.      DX-ABDOMEN FOR TUBE PLACEMENT   Final Result      1.  NG tube coiled in the fundus of the stomach.      DX-ABDOMEN FOR TUBE PLACEMENT   Final Result      Cortrak feeding tube coiled in fundus of stomach.      DX-ABDOMEN FOR TUBE PLACEMENT    Final Result      1.  Stable appearance of the enteric tube which is looped in the distal stomach and proximal duodenum with the tip directed cephalad at the GE junction.      DX-ABDOMEN FOR TUBE PLACEMENT   Final Result      Enteric feeding tube appears to be kinked in the expected location of the second portion of the duodenum with the tip terminating in the left upper quadrant, likely intragastric.      MR-MRA HEAD-W/O   Final Result         1.  Marked motion artifact but grossly normal MR angiogram of the [place].      MR-MRA NECK-W/O   Final Result      1.  Normal MR angiogram of the carotid arteries and vertebral basilar system..      DX-CHEST-PORTABLE (1 VIEW)   Final Result      1.  No acute cardiac or pulmonary abnormalities are identified.   2.  NG tube side-port projects at the gastroesophageal junction. Recommend advancing.      CT-HEAD W/O   Final Result      1.  BILATERAL frontal subarachnoid blood   2.  Atrophy      Based solely on CT findings, the brain injury guideline category is mBIG 2.      Nondisplaced skull fx   SDH 4.1-7.9mm   IPH 4.1-7.9mm   SAH 1 hemisphere, >3 sulci, 1-3mm      The original BIG retrospective analysis found radiographic progression in 0% of BIG 1 patients and 2.6% BIG 2.         Findings were communicated with and acknowledged by JEREMY M GONDA via [employer] on 11/4/2022 4:35 PM.      DX-ABDOMEN FOR TUBE PLACEMENT   Final Result      Enteric tube placement which appears intragastric.      DX-CHEST-PORTABLE (1 VIEW)   Final Result         1.  No acute cardiopulmonary disease.   2.  Trace left pleural effusion      DX-CHEST-PORTABLE (1 VIEW)   Final Result      1.  Tubes and lines in good position.      2.  11 mm nodule in left mid chest.      DX-CHEST-PORTABLE (1 VIEW)   Final Result      1.  No acute cardiopulmonary abnormality.   2.  15 mm left upper lung nodule which is nonspecific. Further evaluation with chest CT may be performed.   3.  No consolidation or  pleural effusion.   4.  Nasogastric tube side port is at the GE junction. Recommend advancement.             Assessment/Plan  * Acute metabolic encephalopathy  Assessment & Plan  Possibly due to oxycodone overdose and SAH    EEG consistent with encephalopathy no seizures noted  Ammonia normal  MRI trace bilateral subarachnoid blood and possible right frontal lobe lesion per radiologist suspected to be artifact secondary to motion but cannot rule out metastatic lesion    Avoid sedating agents frequent orientation  Consider repeating MRI with contrast when more clinically stable    Elevated troponin  Assessment & Plan  Likely demand ischemia  Echocardiogram with normal EF no regional wall motion abnormalities and hyperdynamic LV     Continue metoprolol       Seizure (HCC)  Assessment & Plan  Continue Keppra  EEGrevealed encephalopathy no active seizures  MRI?  Right frontal lobe lesion versus artifact  Repeat MRI with contrast when more clinically stable    Metastatic breast cancer (HCC)  Assessment & Plan  Follow-up with oncology after discharge    Dysphagia  Assessment & Plan      SLP and aspiration precautions  Continue thiamine and monitor electrolytes  Advance diet per SLP    Primary hypertension  Assessment & Plan  Controlled on amlodipine continue to monitor    Hypernatremia  Assessment & Plan  Continue D5W  Encourage oral hydration    Diarrhea  Assessment & Plan  C diff was neg  abd exam benign  Imodium PRN  Reviewed prior records under different MRN patient hospitalized at [facility] evaluated by GI Dr. Pate with EGD and colonoscopy pathology with lymphocytic infiltration felt to be secondary to her breast cancer therapy    Continue Imodium we will add cholestyramine    Subarachnoid hemorrhage (HCC)  Assessment & Plan  MRA head and neck was negative for aneurysm  Likely traumatic only trace blood noted on MRI        Aspiration pneumonia (HCC)  Assessment & Plan  Continue Unasyn will complete 5-day  course on 11/16/2022  Monitor CBC and follow-up on urinalysis and repeat cultures    Hypokalemia  Assessment & Plan  Severe likely secondary to chronic GI losses    Replete and monitor    AYDEE (acute kidney injury) ([facility])  Assessment & Plan  Worsening serum creatinine suspect prerenal     Renal ultrasound ordered and reviewed with no hydronephrosis  Check urine sodium  Increase p.o. bicarb for metabolic acidosis  Encourage p.o. hydration  IV fluid bolus and continue D5W infusion discussed with nursing staff IV being replaced    Sepsis ([facility])  Assessment & Plan  Chest11/7 Wbc:18.4  Monitor vitals and labs  C/O aspiration pneumonia  11/4 Cdiff negative  11/3 Blood cultures no growth.  11/3 resp culture with MSSA  Previously completed antibiotic course for MSSA pneumonia monitor off antibiotics    On Unasyn for suspected aspiration pneumonia  Worsening leukocytosis however overall clinically improved  Will continue Unasyn repeat blood cultures, check urinalysis  Monitor clinically and monitor CBC    Acute respiratory failure with hypoxia ([facility])  Assessment & Plan  Extubated 11/5  Previously completed antibiotic therapy for MSSA pneumonia  Given hypoxia and worsening leukocytosis started on Unasyn on 11/11/2022 for suspected aspiration pneumonia   Continue Unasyn and monitor clinically  Given worsening leukocytosis will check UA and blood cultures       VTE prophylaxis: SCDs/TEDs    I have performed a physical exam and reviewed and updated ROS and Plan today (11/14/2022). In review of yesterday's note (11/13/2022), there are no changes except as documented above.

## 2022-11-14 NOTE — CARE PLAN
The patient is Stable - Low risk of patient condition declining or worsening    Shift Goals  Clinical Goals: New NG tube placement  Patient Goals: Water  Family Goals: SHAI    Progress made toward(s) clinical / shift goals:    PT HAS NO C/O PAIN    Patient is not progressing towards the following goals:    PT GROIN EXCORIATED

## 2022-11-14 NOTE — PROGRESS NOTES
Monitor Summary: -117, UT .13, QRS .05, QT .29 with 9-17 beats PSVT per strip from monitor room

## 2022-11-14 NOTE — DIETARY
NUTRITION SERVICES: UPDATE  Day 11 of admit.  Ashleigh Marquis is a 61 y.o. female with admitting DX of Narcotic overdose, accidental or unintentional, initial encounter     Pt transitioned from TF to PO diet on 11/14. Per chart review pt pulled NGT 11/12, new tube was placed and then clogged and tube was removed. Pt reassessed by SLP 11/13 and Full liquid diet started. No intake yet recorded since diet advanced. If intake <50% x48-72 hours recommend replacing NGT.      Problem: Nutritional:  Goal: Achieve adequate nutritional intake  Description: Patient will consume >50% of meals    RD following

## 2022-11-14 NOTE — CARE PLAN
The patient is Watcher - Medium risk of patient condition declining or worsening    Shift Goals  Clinical Goals: Safety, maintain skin intergrity  Patient Goals: SHAI  Family Goals: SHAI    Progress made toward(s) clinical / shift goals:  q2hour turns, barrier cream applied. Safety education given to patient frequently.     Patient is not progressing towards the following goals:

## 2022-11-14 NOTE — THERAPY
Occupational Therapy  Daily Treatment     Patient Name: Ashleigh Marquis  Age:  61 y.o., Sex:  female  Medical Record #: [id number]  Today's Date: 11/14/2022       Precautions: Fall Risk, Swallow Precautions ( See Comments)      Assessment    Pt seen for OT tx. Continues to be limited by decreased activity tolerance, balance deficits, inattention and poor safety awareness impacting ability to complete ADLs and ADL transfers independently. Improved attention and command following during session. Able to complete seated grooming w/ setup. Will continue to benefit from OT services while in house.     Plan    Continue current treatment plan.    DC Equipment Recommendations: Unable to determine at this time  Discharge Recommendations: Recommend post-acute placement for additional occupational therapy services prior to discharge home       11/14/22 1145   Cognition    Cognition / Consciousness X   Safety Awareness Impaired   New Learning Impaired   Attention Impaired   Active ROM Upper Body   Active ROM Upper Body  X   Comments improved ROM but limited by weakness   Strength Upper Body   Upper Body Strength  X   Gross Strength Generalized Weakness, Equal Bilaterally.    Balance   Sitting Balance (Static) Fair   Sitting Balance (Dynamic) Fair   Standing Balance (Static) Poor +   Standing Balance (Dynamic) Poor   Weight Shift Sitting Fair   Weight Shift Standing Poor   Bed Mobility    Supine to Sit Moderate Assist   Sit to Supine Minimal Assist   Activities of Daily Living   Grooming Seated;Minimal Assist   Upper Body Dressing Moderate Assist   Lower Body Dressing Maximal Assist   Toileting Maximal Assist   Functional Mobility   Sit to Stand Moderate Assist   Short Term Goals   Short Term Goal # 1 pt will demo toilet txf with supv   Goal Outcome # 1 Goal not met   Short Term Goal # 2 pt will groom in stance at the sink with supv   Goal Outcome # 2 Goal not met   Short Term Goal # 3 pt will dress LB with supv   Goal Outcome  # 3 Goal not met   Short Term Goal # 4 pt will follow 2 step direction 75% of the time   Goal Outcome # 4 Progressing as expected   Anticipated Discharge Equipment and Recommendations   Discharge Recommendations Recommend post-acute placement for additional occupational therapy services prior to discharge home

## 2022-11-15 NOTE — PROGRESS NOTES
[place] from Lab called with critical result of Potassium 2.7 at 1123. Critical lab result read back to [place].   Dr. Ochoa notified of critical lab result at 1124.  Critical lab result read back by Dr. Ochoa.

## 2022-11-15 NOTE — PROGRESS NOTES
Hospital Medicine Daily Progress Note    Date of Service  11/15/2022    Chief Complaint  Found unresponsive    Hospital Course  Ashleigh Marquis is a 61 y.o. female with a history of breast cancer, chronic pain on oxycodone.  She was found unresponsive by a .  Paramedics arrived placed her on 15 L nonrebreather mask patient was tachycardic with heart rate in the 190s, she was hypothermic 93 °F.  Per report she was given Narcan and had some response.  Admitted 11/3/2022 with acute encephalopathy.  Labs here at the hospital showed acute kidney injury, hypokalemia, metabolic acidosis and concern of sepsis.  Patient was initially intubated placed on bicarb started on pressor support as well as broad-spectrum antibiotics of Zosyn and linezolid.  CT of the head showed by frontal subarachnoid hemorrhages.  MRA of the head and neck was negative for aneurysm.  The subarachnoid hemorrhage was felt due to be from trauma.  Patient was extubated 11/5 and weaned off of pressors.  Patient did grow out MSSA from her sputum antibiotics were adjusted to Unasyn.    Interval Problem Update  Patient diet advanced after fees to liquid diet.  Patient with persistent diarrhea we will discontinue the Imodium and start Lomotil.  Also add lactose-free to diet.  Core track will be discontinued.      I have discussed this patient's plan of care and discharge plan at IDT rounds today with Case Management, Nursing, Nursing leadership, and other members of the IDT team.    Consultants/Specialty  critical care    Code Status  DNAR/DNI    Disposition  Patient is not medically cleared for discharge.   Anticipate discharge to  be determined .  I have placed the appropriate orders for post-discharge needs.    Review of Systems  Review of Systems   Unable to perform ROS: Mental status change      Physical Exam  Temp:  [36.3 °C (97.3 °F)-36.6 °C (97.9 °F)] 36.3 °C (97.3 °F)  Pulse:  [] 82  Resp:  [18-19] 18  BP: (119-121)/(75-81)  119/76  SpO2:  [93 %-98 %] 98 %    Physical Exam  Vitals and nursing note reviewed.   Constitutional:       General: She is not in acute distress.  HENT:      Head: Normocephalic and atraumatic.      Nose: Nose normal. No rhinorrhea.      Mouth/Throat:      Pharynx: No oropharyngeal exudate or posterior oropharyngeal erythema.   Eyes:      General: No scleral icterus.        Right eye: No discharge.         Left eye: No discharge.   Cardiovascular:      Rate and Rhythm: Regular rhythm. Tachycardia present.      Heart sounds: Normal heart sounds. No murmur heard.    No friction rub. No gallop.   Pulmonary:      Effort: Pulmonary effort is normal. No respiratory distress.      Breath sounds: No stridor. Rhonchi present. No rales.   Chest:      Chest wall: No tenderness.   Abdominal:      General: Bowel sounds are normal. There is no distension.      Palpations: Abdomen is soft. There is no mass.      Tenderness: There is no abdominal tenderness. There is no rebound.   Musculoskeletal:         General: Swelling present. No tenderness.      Cervical back: Neck supple. No rigidity.   Skin:     General: Skin is warm and dry.      Coloration: Skin is not cyanotic or jaundiced.      Nails: There is no clubbing.   Neurological:      General: No focal deficit present.      Mental Status: She is alert.      Cranial Nerves: No cranial nerve deficit.      Motor: No weakness.      Comments: Oriented to self and place   Psychiatric:         Speech: Speech is delayed.         Cognition and Memory: She exhibits impaired recent memory.       Fluids    Intake/Output Summary (Last 24 hours) at 11/15/2022 1232  Last data filed at 11/15/2022 0800  Gross per 24 hour   Intake 2001.27 ml   Output 1700 ml   Net 301.27 ml         Laboratory  Recent Labs     11/13/22  0240 11/14/22  0740 11/15/22  0305   WBC 19.4* 20.9* 17.2*   RBC 4.09* 4.05* 3.41*   HEMOGLOBIN 12.9 12.8 10.7*   HEMATOCRIT 40.9 40.0 33.2*   .0* 98.8* 97.4   MCH 31.5  31.6 31.4   MCHC 31.5* 32.0* 32.2*   RDW 55.8* 53.9* 52.3*   PLATELETCT 174 192 177   MPV 12.4 12.1 12.3       Recent Labs     11/13/22  0605 11/14/22  0740 11/15/22  1030   SODIUM 147* 147* 137   POTASSIUM 3.8 3.5* 2.7*   CHLORIDE 113* 111 105   CO2 16* 16* 16*   GLUCOSE 125* 120* 144*   BUN 52* 63* 60*   CREATININE 2.50* 2.62* 2.36*   CALCIUM 9.6 9.7 8.7                       Imaging  US-RENAL   Final Result      1.  Normal renal ultrasound.      DX-ABDOMEN FOR TUBE PLACEMENT   Final Result         Gastric drainage tube with tip projecting over the expected area of the first portion duodenum.      MR-BRAIN-WITH & W/O   Final Result      1.  Trace BILATERAL subarachnoid blood redemonstrated without appreciable change   2.  Possible RIGHT frontal lobe lesion with internal hemorrhage though I suspect this is imaging artifact related to motion. A metastatic lesion could have this appearance. Recommend repeating the contrast enhanced sequences when the patient may be    sedated or otherwise better able to remain still.   3.  Atrophy   4.  No evidence of acute ischemia, suggestion of other mass or other hemorrhage      EC-ECHOCARDIOGRAM COMPLETE W/ CONT   Final Result      CT-HEAD W/O   Final Result      Decreased trace subarachnoid hemorrhage in the bilateral frontal vertices. No new acute intracranial hemorrhage.         DX-CHEST-PORTABLE (1 VIEW)   Final Result      1.  Bilateral perihilar interstitial and early alveolar opacities, favoring edema over pneumonia. Still, pneumonia can be considered in the appropriate clinical settings.   2.  No lobar consolidation. No large pleural effusions.      DX-ABDOMEN FOR TUBE PLACEMENT   Final Result      1.  Feeding tube tip overlies the distal gastric body.      DX-ABDOMEN FOR TUBE PLACEMENT   Final Result      1.  NG tube coiled in the fundus of the stomach.      DX-ABDOMEN FOR TUBE PLACEMENT   Final Result      Cortrak feeding tube coiled in fundus of stomach.       DX-ABDOMEN FOR TUBE PLACEMENT   Final Result      1.  Stable appearance of the enteric tube which is looped in the distal stomach and proximal duodenum with the tip directed cephalad at the GE junction.      DX-ABDOMEN FOR TUBE PLACEMENT   Final Result      Enteric feeding tube appears to be kinked in the expected location of the second portion of the duodenum with the tip terminating in the left upper quadrant, likely intragastric.      MR-MRA HEAD-W/O   Final Result         1.  Marked motion artifact but grossly normal MR angiogram of the [place].      MR-MRA NECK-W/O   Final Result      1.  Normal MR angiogram of the carotid arteries and vertebral basilar system..      DX-CHEST-PORTABLE (1 VIEW)   Final Result      1.  No acute cardiac or pulmonary abnormalities are identified.   2.  NG tube side-port projects at the gastroesophageal junction. Recommend advancing.      CT-HEAD W/O   Final Result      1.  BILATERAL frontal subarachnoid blood   2.  Atrophy      Based solely on CT findings, the brain injury guideline category is mBIG 2.      Nondisplaced skull fx   SDH 4.1-7.9mm   IPH 4.1-7.9mm   SAH 1 hemisphere, >3 sulci, 1-3mm      The original BIG retrospective analysis found radiographic progression in 0% of BIG 1 patients and 2.6% BIG 2.         Findings were communicated with and acknowledged by JEREMY M GONDA via [employer] on 11/4/2022 4:35 PM.      DX-ABDOMEN FOR TUBE PLACEMENT   Final Result      Enteric tube placement which appears intragastric.      DX-CHEST-PORTABLE (1 VIEW)   Final Result         1.  No acute cardiopulmonary disease.   2.  Trace left pleural effusion      DX-CHEST-PORTABLE (1 VIEW)   Final Result      1.  Tubes and lines in good position.      2.  11 mm nodule in left mid chest.      DX-CHEST-PORTABLE (1 VIEW)   Final Result      1.  No acute cardiopulmonary abnormality.   2.  15 mm left upper lung nodule which is nonspecific. Further evaluation with chest CT may be  performed.   3.  No consolidation or pleural effusion.   4.  Nasogastric tube side port is at the GE junction. Recommend advancement.             Assessment/Plan  * Acute metabolic encephalopathy  Assessment & Plan  Possibly due to oxycodone overdose and SAH    EEG consistent with encephalopathy no seizures noted  Ammonia normal  MRI trace bilateral subarachnoid blood and possible right frontal lobe lesion per radiologist suspected to be artifact secondary to motion but cannot rule out metastatic lesion    Avoid sedating agents frequent orientation  Consider repeating MRI with contrast when more clinically stable    Elevated troponin  Assessment & Plan  Likely demand ischemia  Echocardiogram with normal EF no regional wall motion abnormalities and hyperdynamic LV     Continue metoprolol       Seizure (HCC)  Assessment & Plan  Continue Keppra  EEGrevealed encephalopathy no active seizures  MRI?  Right frontal lobe lesion versus artifact  Repeat MRI with contrast when more clinically stable    Metastatic breast cancer (HCC)  Assessment & Plan  Follow-up with oncology after discharge    Dysphagia  Assessment & Plan      SLP and aspiration precautions  Continue thiamine and monitor electrolytes  Advance diet per SLP    Primary hypertension  Assessment & Plan  Controlled on amlodipine continue to monitor    Hypernatremia  Assessment & Plan  Continue D5W  Encourage oral hydration    Diarrhea  Assessment & Plan  C diff was neg  abd exam benign  Imodium PRN  Reviewed prior records under different MRN patient hospitalized at [facility] evaluated by GI Dr. Pate with EGD and colonoscopy pathology with lymphocytic infiltration felt to be secondary to her breast cancer therapy    Continue Imodium we will add cholestyramine  11/15: Patient with persistent diarrhea.  We will discontinue the Imodium and switch to Lomotil.  Continue the cholestyramine.  Diet changed to include lactose-free    Subarachnoid hemorrhage  ([facility])  Assessment & Plan  MRA head and neck was negative for aneurysm  Likely traumatic only trace blood noted on MRI        Aspiration pneumonia ([facility])  Assessment & Plan  Continue Unasyn will complete 5-day course on 11/16/2022  Monitor CBC and follow-up on urinalysis and repeat cultures    Hypokalemia  Assessment & Plan  Severe likely secondary to chronic GI losses    Replete and monitor    AYDEE (acute kidney injury) ([facility])  Assessment & Plan  Worsening serum creatinine suspect prerenal     Renal ultrasound ordered and reviewed with no hydronephrosis  Check urine sodium  Increase p.o. bicarb for metabolic acidosis  Encourage p.o. hydration  IV fluid bolus and continue D5W infusion discussed with nursing staff IV being replaced    Sepsis ([facility])  Assessment & Plan  Chest11/7 Wbc:18.4  Monitor vitals and labs  C/O aspiration pneumonia  11/4 Cdiff negative  11/3 Blood cultures no growth.  11/3 resp culture with MSSA  Previously completed antibiotic course for MSSA pneumonia monitor off antibiotics    On Unasyn for suspected aspiration pneumonia  Worsening leukocytosis however overall clinically improved  Will continue Unasyn repeat blood cultures, check urinalysis  Monitor clinically and monitor CBC    Acute respiratory failure with hypoxia ([facility])  Assessment & Plan  Extubated 11/5  Previously completed antibiotic therapy for MSSA pneumonia  Given hypoxia and worsening leukocytosis started on Unasyn on 11/11/2022 for suspected aspiration pneumonia   Continue Unasyn and monitor clinically  Given worsening leukocytosis will check UA and blood cultures         VTE prophylaxis: SCDs/TEDs    I have performed a physical exam and reviewed and updated ROS and Plan today (11/15/2022). In review of yesterday's note (11/14/2022), there are no changes except as documented above.

## 2022-11-15 NOTE — THERAPY
Physical Therapy   Daily Treatment     Patient Name: Ashleigh Marquis  Age:  61 y.o., Sex:  female  Medical Record #: [id number]  Today's Date: 11/14/2022     Precautions  Precautions: Fall Risk;Swallow Precautions ( See Comments)  Comments: MAP> 65 and SBP <140    Assessment    Pt more alert today, visually tracking and attempting to speak. When asked to look at her nails did hold out hand appropriately. Require tactile assist to being bed mobility, however once this provided able to use UEs to assist in coming to EOB. Initially able to hold self without LOB. Highly distractible, max cues to attend to task. STS completed, mod assist. Good initiation of LEs and ability to WB. No buckling. Pt attempting to return back to bed thus gait not completed. Continue to recommend placement.     Plan    Continue current treatment plan.    DC Equipment Recommendations: Unable to determine at this time  Discharge Recommendations: Recommend post-acute placement for additional physical therapy services prior to discharge home           Objective       11/14/22 1215   Cognition    Level of Consciousness Alert   Safety Awareness Impaired;Impulsive   New Learning Impaired   Attention Impaired   Sequencing Impaired   Comments Alert and visually tracking. Attempting to speak. Roughly 25% command following.   Balance   Sitting Balance (Static) Fair   Sitting Balance (Dynamic) Fair -   Standing Balance (Static) Poor   Standing Balance (Dynamic) Trace +   Weight Shift Sitting Fair   Weight Shift Standing Poor   Skilled Intervention Verbal Cuing;Tactile Cuing;Sequencing   Comments physical assist when standing   Gait Analysis   Gait Level Of Assist Unable to Participate   Bed Mobility    Supine to Sit Moderate Assist   Sit to Supine Minimal Assist   Scooting Minimal Assist   Skilled Intervention Verbal Cuing;Tactile Cuing;Postural Facilitation;Sequencing   Functional Mobility   Sit to Stand Moderate Assist   Skilled Intervention Tactile  Cuing;Sequencing;Verbal Cuing   How much difficulty does the patient currently have...   Turning over in bed (including adjusting bedclothes, sheets and blankets)? 2   Sitting down on and standing up from a chair with arms (e.g., wheelchair, bedside commode, etc.) 1   Moving from lying on back to sitting on the side of the bed? 1   How much help from another person does the patient currently need...   Moving to and from a bed to a chair (including a wheelchair)? 2   Need to walk in a hospital room? 1   Climbing 3-5 steps with a railing? 1   6 clicks Mobility Score 8   Short Term Goals    Short Term Goal # 1 Pt will perform supine <> sit with SPV in 6 visits to return to PLOF   Goal Outcome # 1 goal not met   Short Term Goal # 2 Pt will perform STS tranfsers with FWW and SPV in 6 visits to improve functional independence   Goal Outcome # 2 Goal not met   Short Term Goal # 3 Pt will ambulate 50ft with FWW and CGA in 6 visits to initiate gait training   Goal Outcome # 3 Goal not met

## 2022-11-15 NOTE — THERAPY
"Speech Language Pathology   Fiberoptic Endoscopic Evaluation of Swallow     Patient Name: Ashleigh Marquis  AGE:  61 y.o., SEX:  female  Medical Record #: [id number]  Today's Date: 11/14/2022     Precautions: Fall Risk, Swallow Precautions ( See Comments)  Comments: MAP >65 and SBP <140    Assessment    HPI: Pt is 61 y.o. female, found unresponsive at home by , reportedly cold and cyanotic on scene. Pt intubated in ED, central line placed. CSE 11/6.      CMHx: Acute metabolic encephalopathy, acute respiratory failure w/ hypoxia, sepsis, AYDEE, aspiration PNA, SAH, HTN, metastatic breast cancer, seizure, SHARRI?  PMHx: None on file     MRI Brain w/ and w/o 11/12:  \"1.  Trace BILATERAL subarachnoid blood redemonstrated without appreciable change  2.  Possible RIGHT frontal lobe lesion with internal hemorrhage though I suspect this is imaging artifact related to motion. A metastatic lesion could have this appearance. Recommend repeating the contrast enhanced sequences when the patient may be   sedated or otherwise better able to remain still.  3.  Atrophy  4.  No evidence of acute ischemia, suggestion of other mass or other hemorrhage\"    Current Method of Nutrition   Full liquids pending FEES      Pertinent Information:  Affect/Behavior: Flat  Oxygen Requirements: Room Air  Feeding Tube: None  Dentition: Fair  Factor(s) Affecting Performance: Impaired mental status, Impaired command following    Discussed the risks, benefits, and alternatives of the FEES procedure. Patient acknowledged and agreed to proceed.     Assessment  Flexible Endoscopic Evaluation of Swallowing (FEES) completed at bedside today. The endoscope was passed transnasally via right nare to evaluate the anatomy and physiology of swallowing.  FEES procedure was initially tolerated well with no apparent distress, but about 11 minutes into the study, she stated \"I'm tired: and reached up and yanked SLP's hand pulling the scope out.  Procedure was " ended at this time.     Anatomic Findings:  upon insertion of the scope, it was noted that the left arytenoid and the left vocal cord had little to no movement appreciated.  For most of the study, the left vocal cord appeared to be at the 1 O'clock position.  Patient would not really follow any commands to effectively assess further movement  Vocal Fold Motion:  See above  Secretion Management: minimally pooled secretions in the valleculae  PO trials: ice chips, thin liquids, mildly thick liquids, liquidized, puree, soft & bite sized      Consistency PAS Score Timing Comments   Ice Chips 1 N/A    Thin Liquid 8 Pre swallow and Post swallow  PAS 1 x2 on teaspoon  PAS 8 x 3 on cup sip   Mildly Thick Liquid 5 Pre swallow PAS 5 x 1 on straw boluses  PAS 3 x 2 on straw bolus  PAS 2 via cup x2  PAS 1 x2   Liquidized 1 N/A    Puree 1 N/A    Soft & Bite Sized 3 Pre swallow On 2/2 trials, then patient self aborted the study     Penetration-Aspiration Scale (PAS):    1     No contrast enters airway  2     Contrast enters the airway, remains above the vocal folds, and is ejected from the airway (not seen in the airway at the end of the swallow).  3     Contrast enters the airway, remains above the vocal folds, and is not ejected from the airway (is seen in the airway after the swallow).  4     Contrast enters the airway, contacts the vocal folds, and is ejected from the airway.  5     Contrast enters the airway, contacts the vocal folds, and is not ejected from the airway  6     Contrast enters the airway, crosses the plane of the vocal folds, and is ejected from the airway.  7     Contrast enters the airway, crosses the plane of the vocal folds, and is not ejected from the airway despite effort.  8     Contrast enters the airway, crosses the plane of the vocal folds, is not ejected from the airway and there is no response to aspiration.    Oral phase:   Impaired oral acceptance due to confusion with patient requiring min to  mod encouragement to open mouth, close mouth around utensil and to swallow.   Patient with prolonged oral phase (especially on purees and to a lesser extent on liquidised) and suspected impaired orolingual control and loss of bolus control leading to delayed onset of swallow.        Pharyngeal phase:     Patient was noted to have delayed onset of pharyngeal swallow with pooling in the valleculae on purees and liquidised for up to 20 seconds before the onset of the pharyngeal swallow.  She had impaired base of tongue retraction and pharyngeal constriction with mild pharyngeal residue noted with liquidized, purees and thins. Penetration and aspiration were noted before the swallow on thins and penetration before the swallow on mildly thick liquids due to laryngeal mistiming and impaired closure of the true vocal cords with trace aspiration/penetration after the swallow from thin liquid residue in the pyriform sinuses trickling in at the level of the post glottic space.  Pharyngeal sensation was also impaired as evidenced in no response to aspiration of thin liquids via tsp and cup and penetration of mildly thick liquids.       CLINICAL IMPRESSIONS:    The patient is presenting with a moderate oropharyngeal dysphagia likely acute related to traumatic brain injury and acute encephalopathy as well as altered mental status with poor abilities to follow commands.   Furthermore, given decreased mobility of the left arytenoid suspected true vocal cord impairment with incomplete glottic closure, there is higher risk for laryngeal mistiming and laryngeal invasion. Patient was not able to follow commands to complete any strategies such as a head turn to see if better glottic closure could be obtained at the onset of the swallow.     Pharyngeal efficiency is impaired, characterized by vallecular and piriform sinus residue. Airway protection is impaired with decreased sensation as evidenced in no response to  "penetration/aspiration.    At this time, would modify diet to a liquidised diet with mildly thick liquids (LQ4/MT2) to minimize aspiration risk.  SLP will continue to follow for oral motor exercises, to work on timing of swallow initiation, and for PO trials of advanced textures as patient is able to tolerate.      Clinical Impressions  The pt presents with a mild-moderate oropharyngeal dysphagia, likely acute related to acute encephalopathy and bilateral SAH's due to trauma.  Swallow safety is impaired due to dysphagia and inconsistent abilities to follow commands.      RECOMMENDATIONS:    Liquidised diet with mildly thick liquids (LQ4/MT2)  2.  Swallowing Instructions & Precautions:   Supervision: 1:1 feeding with constant supervision, Assist with meal tray set up, Direct supervision during meals, Encourage self-feeding  Positioning: Fully upright and midline during oral intake  Medication: Whole with puree  Strategies: Small bites/sips, Alternate bites and sips, Slow rate of intake, No straws, Multiple swallows (x 2) per bite/sip   Oral Care: Q4h  3.  SLP to follow for cognitive and swallowing therapy.     Plan    Recommend Speech Therapy 5 times per week until therapy goals are met for the following treatments:  Dysphagia Training, Comprehension Training, Expression Training, Cognitive-Linguistic Training, and Patient / Family / Caregiver Education.    Discharge Recommendations: Recommend post-acute placement for additional speech therapy services prior to discharge home    Subjective    \"Is there any sherbet?\"      Objective       11/14/22 1410   Precautions   Precautions Fall Risk;Swallow Precautions ( See Comments)   Comments MAP >65 and SBP <140   Dysphagia Rating   Nutritional Liquid Intake Rating Scale Thickened beverages (mildly thick unless otherwise specified)   Nutritional Food Intake Rating Scale Total oral diet of a single consistency   Problem List   Problem List Dysphagia;Cognitive-Linguistic " Deficits   Evaluation Recommendations   Diet / Liquid Recommendation Liquidised (3) - (Nectar Thick Full Liquid);Mildly Thick (2) - (Nectar Thick)   Medication Administration  Liquid Medication Only;Float Whole with Puree  (float with applesauce)   Short Term Goals   Short Term Goal # 1 11/14: Pt will consume PO trials of purees/thin liquids via teaspoon with SLP only with timely swallow and no overt S/Sx of aspiration noted   Short Term Goal # 1 B  11/14/22: Pt will consume liquiduidised diet given 1:1 supervision for PO intake without any overt s/sx of aspiration   Short Term Goal # 2 Pt will complete FEES to further assess swallow function and determine safety for PO.   Goal Outcome # 2  Goal met, new goal aded   Short Term Goal # 2 B  Pt will be able to complete falsetto and effortful swallow exercises in 10/10 reps with max cues from SLP.   Education Group   Education Provided Traumatic Brain Injury / Cognitive-Linguistic;Dysphagia   Dysphagia Patient Response Patient;Acceptance;Explanation;Reinforcement Needed;Demonstration   TBI / Cog-Ling Patient Response Patient;Acceptance;Explanation;Reinforcement Needed   Anticipated Discharge Needs   Discharge Recommendations Recommend post-acute placement for additional speech therapy services prior to discharge home   Therapy Recommendations Upon DC Dysphagia Training;Cognitive-Linguistic Training;Community Re-Integration;Patient / Family / Caregiver Education

## 2022-11-15 NOTE — CARE PLAN
The patient is Watcher - Medium risk of patient condition declining or worsening    Shift Goals  Clinical Goals: Safety, eat at least 50% of meals.  Patient Goals: SHAI  Family Goals: SHAI    Progress made toward(s) clinical / shift goals:  Safety education provided. Patient eating about 25% of meals. Encourage more intake.     Problem: Skin Integrity  Goal: Skin integrity is maintained or improved  Outcome: Progressing       Patient is not progressing towards the following goals:

## 2022-11-15 NOTE — CARE PLAN
The patient is Stable - Low risk of patient condition declining or worsening    Shift Goals  Clinical Goals: Safety\\  Patient Goals: SHAI  Family Goals: SHAI    Progress made toward(s) clinical / shift goals:      Problem: Fall Risk  Goal: Patient will remain free from falls  Outcome: Progressing     Problem: Skin Integrity  Goal: Skin integrity is maintained or improved  Outcome: Progressing     Problem: Pain - Standard  Goal: Alleviation of pain or a reduction in pain to the patient’s comfort goal  Outcome: Progressing       Patient is not progressing towards the following goals:

## 2022-11-16 NOTE — CARE PLAN
Problem: Knowledge Deficit - Standard  Goal: Patient and family/care givers will demonstrate understanding of plan of care, disease process/condition, diagnostic tests and medications  Outcome: Not Met     Problem: Pain - Standard  Goal: Alleviation of pain or a reduction in pain to the patient’s comfort goal  Outcome: Progressing     Problem: Skin Integrity  Goal: Skin integrity is maintained or improved  Outcome: Progressing     Problem: Fall Risk  Goal: Patient will remain free from falls  Outcome: Progressing   The patient is Stable - Low risk of patient condition declining or worsening    Shift Goals  Clinical Goals: Safety, eat at least 50% of meals.  Patient Goals: SHAI  Family Goals: SHAI    Progress made toward(s) clinical / shift goals:  denies pain. Skin and fall precautions in place.    Patient is not progressing towards the following goals: unable to demonstrate understanding.      Problem: Knowledge Deficit - Standard  Goal: Patient and family/care givers will demonstrate understanding of plan of care, disease process/condition, diagnostic tests and medications  Outcome: Not Met

## 2022-11-16 NOTE — THERAPY
Physical Therapy   Daily Treatment     Patient Name: Ashleigh Marquis  Age:  61 y.o., Sex:  female  Medical Record #: [id number]  Today's Date: 11/16/2022     Precautions  Precautions: Fall Risk;Swallow Precautions ( See Comments)  Comments: MAP> 65 and SBP<140    Assessment    Pt greeted and agreed to therapy, pt able to now verbalize some responses. Pt followed cues to come to EOB, able to maintain sitting balance for 2 min then returned to supine, refused further activity. Pt continues to be limited by impaired cognition and insight, pt will continue to benefit from skilled PT to address these deficits.     Plan    Continue current treatment plan.    DC Equipment Recommendations: Unable to determine at this time  Discharge Recommendations: Recommend post-acute placement for additional physical therapy services prior to discharge home       11/16/22 0902   Balance   Sitting Balance (Static) Fair   Sitting Balance (Dynamic) Fair -   Weight Shift Sitting Fair   Weight Shift Standing Poor   Skilled Intervention Verbal Cuing;Tactile Cuing   Gait Analysis   Gait Level Of Assist Unable to Participate   Bed Mobility    Supine to Sit Moderate Assist   Sit to Supine Minimal Assist   Scooting Minimal Assist   Rolling Minimum Assist to Lt.;Minimal Assist to Rt.   Skilled Intervention Verbal Cuing;Tactile Cuing;Sequencing   Comments Pt following cues to come to EOB   Functional Mobility   Sit to Stand Refused   Short Term Goals    Short Term Goal # 1 Pt will perform supine <> sit with SPV in 6 visits to return to PLOF   Goal Outcome # 1 goal not met   Short Term Goal # 2 Pt will perform STS tranfsers with FWW and SPV in 6 visits to improve functional independence   Goal Outcome # 2 Goal not met   Short Term Goal # 3 Pt will ambulate 50ft with FWW and CGA in 6 visits to initiate gait training   Goal Outcome # 3 Goal not met   Supervising Physical Therapist (PTA Treatments Only)   Supervising Physical Therapist Susy Hess

## 2022-11-16 NOTE — PROGRESS NOTES
Monitor Summary: SR 81-93, GA -0.13, QRS -0.08, QT -0.32, with rare PVCs per strip from the monitor room.

## 2022-11-16 NOTE — PROGRESS NOTES
Hospital Medicine Daily Progress Note    Date of Service  11/16/2022    Chief Complaint  Found unresponsive    Hospital Course  Ashleigh Marquis is a 61 y.o. female with a history of breast cancer, chronic pain on oxycodone.  She was found unresponsive by a .  Paramedics arrived placed her on 15 L nonrebreather mask patient was tachycardic with heart rate in the 190s, she was hypothermic 93 °F.  Per report she was given Narcan and had some response.  Admitted 11/3/2022 with acute encephalopathy.  Labs here at the hospital showed acute kidney injury, hypokalemia, metabolic acidosis and concern of sepsis.  Patient was initially intubated placed on bicarb started on pressor support as well as broad-spectrum antibiotics of Zosyn and linezolid.  CT of the head showed by frontal subarachnoid hemorrhages.  MRA of the head and neck was negative for aneurysm.  The subarachnoid hemorrhage was felt due to be from trauma.  Patient was extubated 11/5 and weaned off of pressors.  Patient did grow out MSSA from her sputum antibiotics were adjusted to Unasyn.    Interval Problem Update  Patient diet advanced after fees to liquid diet.  Patient with persistent diarrhea we will discontinue the Imodium and start Lomotil.  Also add lactose-free to diet.  Core track will be discontinued.  11/16: Patient with persistent diarrhea.  Still no improvement with Lomotil.  She is also frequently refusing her medications.    I have discussed this patient's plan of care and discharge plan at IDT rounds today with Case Management, Nursing, Nursing leadership, and other members of the IDT team.    Consultants/Specialty  critical care    Code Status  DNAR/DNI    Disposition  Patient is not medically cleared for discharge.   Anticipate discharge to  be determined .  I have placed the appropriate orders for post-discharge needs.    Review of Systems  Review of Systems   Unable to perform ROS: Mental status change      Physical  Exam  Temp:  [36.4 °C (97.5 °F)-36.6 °C (97.9 °F)] 36.5 °C (97.7 °F)  Pulse:  [88-93] 93  Resp:  [16-18] 17  BP: ()/(61-67) 118/65  SpO2:  [93 %-99 %] 99 %    Physical Exam  Vitals and nursing note reviewed.   Constitutional:       General: She is not in acute distress.     Appearance: She is ill-appearing.   HENT:      Head: Normocephalic and atraumatic.      Nose: Nose normal. No rhinorrhea.      Mouth/Throat:      Pharynx: No oropharyngeal exudate or posterior oropharyngeal erythema.   Eyes:      General: No scleral icterus.        Right eye: No discharge.         Left eye: No discharge.   Cardiovascular:      Rate and Rhythm: Regular rhythm. Tachycardia present.      Heart sounds: Normal heart sounds. No murmur heard.    No friction rub. No gallop.   Pulmonary:      Effort: Pulmonary effort is normal. No respiratory distress.      Breath sounds: No stridor. Rhonchi present. No rales.   Chest:      Chest wall: No tenderness.   Abdominal:      General: Bowel sounds are normal. There is no distension.      Palpations: Abdomen is soft. There is no mass.      Tenderness: There is no abdominal tenderness. There is no rebound.   Musculoskeletal:         General: Swelling present. No tenderness.      Cervical back: Neck supple. No rigidity.   Skin:     General: Skin is warm and dry.      Coloration: Skin is not cyanotic or jaundiced.      Nails: There is no clubbing.   Neurological:      General: No focal deficit present.      Mental Status: She is alert.      Cranial Nerves: No cranial nerve deficit.      Motor: No weakness.      Comments: Oriented to self and place   Psychiatric:         Speech: Speech is delayed.         Cognition and Memory: She exhibits impaired recent memory.       Fluids    Intake/Output Summary (Last 24 hours) at 11/16/2022 1307  Last data filed at 11/16/2022 0600  Gross per 24 hour   Intake 1988.17 ml   Output 1800 ml   Net 188.17 ml         Laboratory  Recent Labs     11/14/22  0740  11/15/22  0305 11/16/22  0420   WBC 20.9* 17.2* 11.5*   RBC 4.05* 3.41* 3.18*   HEMOGLOBIN 12.8 10.7* 10.2*   HEMATOCRIT 40.0 33.2* 30.2*   MCV 98.8* 97.4 95.0   MCH 31.6 31.4 32.1   MCHC 32.0* 32.2* 33.8   RDW 53.9* 52.3* 50.1*   PLATELETCT 192 177 169   MPV 12.1 12.3 12.1       Recent Labs     11/14/22  0740 11/15/22  1030 11/16/22  0420   SODIUM 147* 137 139   POTASSIUM 3.5* 2.7* 2.1*   CHLORIDE 111 105 106   CO2 16* 16* 16*   GLUCOSE 120* 144* 124*   BUN 63* 60* 48*   CREATININE 2.62* 2.36* 1.74*   CALCIUM 9.7 8.7 8.9                       Imaging  US-RENAL   Final Result      1.  Normal renal ultrasound.      DX-ABDOMEN FOR TUBE PLACEMENT   Final Result         Gastric drainage tube with tip projecting over the expected area of the first portion duodenum.      MR-BRAIN-WITH & W/O   Final Result      1.  Trace BILATERAL subarachnoid blood redemonstrated without appreciable change   2.  Possible RIGHT frontal lobe lesion with internal hemorrhage though I suspect this is imaging artifact related to motion. A metastatic lesion could have this appearance. Recommend repeating the contrast enhanced sequences when the patient may be    sedated or otherwise better able to remain still.   3.  Atrophy   4.  No evidence of acute ischemia, suggestion of other mass or other hemorrhage      EC-ECHOCARDIOGRAM COMPLETE W/ CONT   Final Result      CT-HEAD W/O   Final Result      Decreased trace subarachnoid hemorrhage in the bilateral frontal vertices. No new acute intracranial hemorrhage.         DX-CHEST-PORTABLE (1 VIEW)   Final Result      1.  Bilateral perihilar interstitial and early alveolar opacities, favoring edema over pneumonia. Still, pneumonia can be considered in the appropriate clinical settings.   2.  No lobar consolidation. No large pleural effusions.      DX-ABDOMEN FOR TUBE PLACEMENT   Final Result      1.  Feeding tube tip overlies the distal gastric body.      DX-ABDOMEN FOR TUBE PLACEMENT   Final Result       1.  NG tube coiled in the fundus of the stomach.      DX-ABDOMEN FOR TUBE PLACEMENT   Final Result      Cortrak feeding tube coiled in fundus of stomach.      DX-ABDOMEN FOR TUBE PLACEMENT   Final Result      1.  Stable appearance of the enteric tube which is looped in the distal stomach and proximal duodenum with the tip directed cephalad at the GE junction.      DX-ABDOMEN FOR TUBE PLACEMENT   Final Result      Enteric feeding tube appears to be kinked in the expected location of the second portion of the duodenum with the tip terminating in the left upper quadrant, likely intragastric.      MR-MRA HEAD-W/O   Final Result         1.  Marked motion artifact but grossly normal MR angiogram of the [place].      MR-MRA NECK-W/O   Final Result      1.  Normal MR angiogram of the carotid arteries and vertebral basilar system..      DX-CHEST-PORTABLE (1 VIEW)   Final Result      1.  No acute cardiac or pulmonary abnormalities are identified.   2.  NG tube side-port projects at the gastroesophageal junction. Recommend advancing.      CT-HEAD W/O   Final Result      1.  BILATERAL frontal subarachnoid blood   2.  Atrophy      Based solely on CT findings, the brain injury guideline category is mBIG 2.      Nondisplaced skull fx   SDH 4.1-7.9mm   IPH 4.1-7.9mm   SAH 1 hemisphere, >3 sulci, 1-3mm      The original BIG retrospective analysis found radiographic progression in 0% of BIG 1 patients and 2.6% BIG 2.         Findings were communicated with and acknowledged by JEREMY M GONDA via [employer] on 11/4/2022 4:35 PM.      DX-ABDOMEN FOR TUBE PLACEMENT   Final Result      Enteric tube placement which appears intragastric.      DX-CHEST-PORTABLE (1 VIEW)   Final Result         1.  No acute cardiopulmonary disease.   2.  Trace left pleural effusion      DX-CHEST-PORTABLE (1 VIEW)   Final Result      1.  Tubes and lines in good position.      2.  11 mm nodule in left mid chest.      DX-CHEST-PORTABLE (1 VIEW)   Final  Result      1.  No acute cardiopulmonary abnormality.   2.  15 mm left upper lung nodule which is nonspecific. Further evaluation with chest CT may be performed.   3.  No consolidation or pleural effusion.   4.  Nasogastric tube side port is at the GE junction. Recommend advancement.             Assessment/Plan  * Acute metabolic encephalopathy  Assessment & Plan  Possibly due to oxycodone overdose and SAH    EEG consistent with encephalopathy no seizures noted  Ammonia normal  MRI trace bilateral subarachnoid blood and possible right frontal lobe lesion per radiologist suspected to be artifact secondary to motion but cannot rule out metastatic lesion    Avoid sedating agents frequent orientation  Consider repeating MRI with contrast when more clinically stable    Elevated troponin  Assessment & Plan  Likely demand ischemia  Echocardiogram with normal EF no regional wall motion abnormalities and hyperdynamic LV     Continue metoprolol       Seizure (HCC)  Assessment & Plan  Continue Keppra  EEGrevealed encephalopathy no active seizures  MRI?  Right frontal lobe lesion versus artifact  Repeat MRI with contrast when more clinically stable    Metastatic breast cancer (HCC)  Assessment & Plan  Follow-up with oncology after discharge    Dysphagia  Assessment & Plan      SLP and aspiration precautions  Continue thiamine and monitor electrolytes  Advance diet per SLP    Primary hypertension  Assessment & Plan  Controlled on amlodipine continue to monitor    Hypernatremia  Assessment & Plan  Continue D5W  Encourage oral hydration    Diarrhea  Assessment & Plan  C diff was neg  abd exam benign  Imodium PRN  Reviewed prior records under different MRN patient hospitalized at [facility] evaluated by GI Dr. Pate with EGD and colonoscopy pathology with lymphocytic infiltration felt to be secondary to her breast cancer therapy    Continue Imodium we will add cholestyramine  11/15: Patient with persistent diarrhea.  We will  discontinue the Imodium and switch to Lomotil.  Continue the cholestyramine.  Diet changed to include lactose-free    Subarachnoid hemorrhage (HCC)  Assessment & Plan  MRA head and neck was negative for aneurysm  Likely traumatic only trace blood noted on MRI        Aspiration pneumonia ([facility])  Assessment & Plan  Continue Unasyn will complete 5-day course on 11/16/2022  Monitor CBC and follow-up on urinalysis and repeat cultures    Hypokalemia  Assessment & Plan  Severe likely secondary to chronic GI losses    Replete and monitor    AYDEE (acute kidney injury) ([facility])  Assessment & Plan  Worsening serum creatinine suspect prerenal     Renal ultrasound ordered and reviewed with no hydronephrosis  Check urine sodium  Increase p.o. bicarb for metabolic acidosis  Encourage p.o. hydration  IV fluid bolus and continue D5W infusion discussed with nursing staff IV being replaced    Sepsis ([facility])  Assessment & Plan  Chest11/7 Wbc:18.4  Monitor vitals and labs  C/O aspiration pneumonia  11/4 Cdiff negative  11/3 Blood cultures no growth.  11/3 resp culture with MSSA  Previously completed antibiotic course for MSSA pneumonia monitor off antibiotics    On Unasyn for suspected aspiration pneumonia  Worsening leukocytosis however overall clinically improved  Will continue Unasyn repeat blood cultures, check urinalysis  Monitor clinically and monitor CBC    Acute respiratory failure with hypoxia ([facility])  Assessment & Plan  Extubated 11/5  Previously completed antibiotic therapy for MSSA pneumonia  Given hypoxia and worsening leukocytosis started on Unasyn on 11/11/2022 for suspected aspiration pneumonia   Continue Unasyn and monitor clinically  Given worsening leukocytosis will check UA and blood cultures         VTE prophylaxis: SCDs/TEDs    I have performed a physical exam and reviewed and updated ROS and Plan today (11/16/2022). In review of yesterday's note (11/15/2022), there are no changes except as documented above.

## 2022-11-16 NOTE — THERAPY
Missed Therapy     Patient Name: Ashleigh Marquis  Age:  61 y.o., Sex:  female  Medical Record #: [id number]  Today's Date: 11/15/2022    Discussed missed therapy with RN. SLP attempted dysphagia tx this date. Pt has reportedly not tolerated current diet of LQ3/MT2, eating approx. 25% of breakfast and lunch this date. Pt verbalized that she does not like her current diet.    SLP provided education concerning importance of dysphagia exercises and rehearsal of strategies to promote improvements in swallow and increase likelihood of diet upgrade. Pt continued to refuse education and PO trials, pulled blanket over her head, and attempted to hit SLP across the face. Tx attempt ceased after pt became aggressive. RN aware, agrees that pt participation has been limited this date.        11/15/22 1603   Treatment Variance   Reason For Missed Therapy Medical - Patient Agitated;Non-Medical - Patient Refused   Total Treatment Time   SLP Time Spent Yes   SLP Swallowing Dysfunction Treatment (Mins) 10   SLP Total Time Spent 10   Dysphagia    Diet / Liquid Recommendation Liquidised (3) - (Nectar Thick Full Liquid);Mildly Thick (2) - (Nectar Thick)   Recommended Route of Medication Administration   Medication Administration  Float Whole with Puree   Education Group   Education Provided Role of Speech Therapy;Dysphagia   Dysphagia Patient Response Patient;Nonacceptance;Explanation;Reinforcement Needed;Teaching Refused   Role of SLP Patient Response Patient;Nonacceptance;Explanation;Reinforcement Needed;Teaching Refused   Interdisciplinary Plan of Care Collaboration   IDT Collaboration with  Nursing   Patient Position at End of Therapy Seated;In Bed;Bed Alarm On;Call Light within Reach   Collaboration Comments RN updated and aware of tx attempt

## 2022-11-16 NOTE — PROGRESS NOTES
"Patient is refusing 1800 oral medications, education provided. Patient is spitting out medication and putting hands over mouth. Dr. Ochoa notified. Per Dr. Ochoa \"try again later tonight\"      " Prescription for atorvastatin sent to pharmacy. If  He has any severe muscle pains or aches he should let me know as this is a rare side effect of the medication.

## 2022-11-16 NOTE — THERAPY
"Missed Therapy     Patient Name: Ashleigh Marquis  Age:  61 y.o., Sex:  female  Medical Record #: [id number]  Today's Date: 11/16/2022    Discussed missed therapy with RN.  Pt in bed initially not willing to answer any questions.  Eventually after being offered several different drinks and puree textures pt stated \"I'm nauseous\".  Pt had not consumed any of her breakfast, not willing to consume any textures this AM due to nausea.         11/16/22 0850   Treatment Variance   Reason For Missed Therapy Non-Medical - Patient Refused;Medical - Patient has Nausea / Vomiting     "

## 2022-11-17 NOTE — PROGRESS NOTES
[facility] Medicine Daily Progress Note    Date of Service  11/17/2022    Chief Complaint  Found unresponsive    Hospital Course  Ashleigh Marquis is a 61 y.o. female with a history of breast cancer, chronic pain on oxycodone.  She was found unresponsive by a .  Paramedics arrived placed her on 15 L nonrebreather mask patient was tachycardic with heart rate in the 190s, she was hypothermic 93 °F.  Per report she was given Narcan and had some response.  Admitted 11/3/2022 with acute encephalopathy.  Labs here at the hospital showed acute kidney injury, hypokalemia, metabolic acidosis and concern of sepsis.  Patient was initially intubated placed on bicarb started on pressor support as well as broad-spectrum antibiotics of Zosyn and linezolid.  CT of the head showed by frontal subarachnoid hemorrhages.  MRA of the head and neck was negative for aneurysm.  The subarachnoid hemorrhage was felt due to be from trauma.  Patient was extubated 11/5 and weaned off of pressors.  Patient did grow out MSSA from her sputum antibiotics were adjusted to Unasyn.    Interval Problem Update  Patient diet advanced after fees to liquid diet.  Patient with persistent diarrhea we will discontinue the Imodium and start Lomotil.  Also add lactose-free to diet.  Core track will be discontinued.  11/16: Patient with persistent diarrhea.  Still no improvement with Lomotil.  She is also frequently refusing her medications.  11/17: Patient started on steroids yesterday for diarrhea she was shown improvement.  Anticipate she will be able to have her FMS removed in a couple days.  Replacing electrolytes.    I have discussed this patient's plan of care and discharge plan at IDT rounds today with Case Management, Nursing, Nursing leadership, and other members of the IDT team.    Consultants/Specialty  critical care    Code Status  DNAR/DNI    Disposition  Patient is not medically cleared for discharge.   Anticipate discharge to  be  determined .  I have placed the appropriate orders for post-discharge needs.    Review of Systems  Review of Systems   Constitutional:  Negative for chills and fever.   Respiratory:  Negative for shortness of breath.    Cardiovascular:  Positive for chest pain.   Gastrointestinal:  Positive for diarrhea.   All other systems reviewed and are negative.     Physical Exam  Temp:  [36.2 °C (97.2 °F)-36.5 °C (97.7 °F)] 36.2 °C (97.2 °F)  Pulse:  [83-99] 92  Resp:  [16-18] 16  BP: (115-129)/(66-81) 129/81  SpO2:  [96 %-100 %] 100 %    Physical Exam  Vitals and nursing note reviewed.   Constitutional:       General: She is not in acute distress.     Appearance: She is ill-appearing.   HENT:      Head: Normocephalic and atraumatic.      Mouth/Throat:      Pharynx: No oropharyngeal exudate or posterior oropharyngeal erythema.   Eyes:      General: No scleral icterus.        Right eye: No discharge.         Left eye: No discharge.   Cardiovascular:      Rate and Rhythm: Normal rate and regular rhythm.      Heart sounds: Normal heart sounds. No murmur heard.    No friction rub. No gallop.   Pulmonary:      Effort: Pulmonary effort is normal. No respiratory distress.      Breath sounds: No stridor. Rhonchi present. No rales.   Chest:      Chest wall: Tenderness present.   Abdominal:      General: Bowel sounds are normal. There is no distension.      Palpations: Abdomen is soft. There is no mass.      Tenderness: There is no abdominal tenderness. There is no rebound.   Musculoskeletal:         General: Swelling present. No tenderness.      Cervical back: Neck supple. No rigidity.   Skin:     General: Skin is warm and dry.      Coloration: Skin is not cyanotic or jaundiced.      Nails: There is no clubbing.   Neurological:      General: No focal deficit present.      Mental Status: She is alert.      Cranial Nerves: No cranial nerve deficit.      Motor: No weakness.      Comments: Oriented to self and place   Psychiatric:          Speech: Speech is delayed.         Cognition and Memory: She exhibits impaired recent memory.       Fluids    Intake/Output Summary (Last 24 hours) at 11/17/2022 1336  Last data filed at 11/17/2022 0800  Gross per 24 hour   Intake 2457.35 ml   Output 1900 ml   Net 557.35 ml       Laboratory  Recent Labs     11/15/22  0305 11/16/22  0420 11/17/22  0302   WBC 17.2* 11.5* 11.4*   RBC 3.41* 3.18* 3.54*   HEMOGLOBIN 10.7* 10.2* 11.2*   HEMATOCRIT 33.2* 30.2* 33.6*   MCV 97.4 95.0 94.9   MCH 31.4 32.1 31.6   MCHC 32.2* 33.8 33.3*   RDW 52.3* 50.1* 50.4*   PLATELETCT 177 169 233   MPV 12.3 12.1 12.3     Recent Labs     11/15/22  1030 11/16/22  0420 11/16/22  1722 11/17/22  0302   SODIUM 137 139  --  136   POTASSIUM 2.7* 2.1* 2.8* 3.1*   CHLORIDE 105 106  --  104   CO2 16* 16*  --  16*   GLUCOSE 144* 124*  --  285*   BUN 60* 48*  --  45*   CREATININE 2.36* 1.74*  --  1.99*   CALCIUM 8.7 8.9  --  9.5                     Imaging  US-RENAL   Final Result      1.  Normal renal ultrasound.      DX-ABDOMEN FOR TUBE PLACEMENT   Final Result         Gastric drainage tube with tip projecting over the expected area of the first portion duodenum.      MR-BRAIN-WITH & W/O   Final Result      1.  Trace BILATERAL subarachnoid blood redemonstrated without appreciable change   2.  Possible RIGHT frontal lobe lesion with internal hemorrhage though I suspect this is imaging artifact related to motion. A metastatic lesion could have this appearance. Recommend repeating the contrast enhanced sequences when the patient may be    sedated or otherwise better able to remain still.   3.  Atrophy   4.  No evidence of acute ischemia, suggestion of other mass or other hemorrhage      EC-ECHOCARDIOGRAM COMPLETE W/ CONT   Final Result      CT-HEAD W/O   Final Result      Decreased trace subarachnoid hemorrhage in the bilateral frontal vertices. No new acute intracranial hemorrhage.         DX-CHEST-PORTABLE (1 VIEW)   Final Result      1.  Bilateral  perihilar interstitial and early alveolar opacities, favoring edema over pneumonia. Still, pneumonia can be considered in the appropriate clinical settings.   2.  No lobar consolidation. No large pleural effusions.      DX-ABDOMEN FOR TUBE PLACEMENT   Final Result      1.  Feeding tube tip overlies the distal gastric body.      DX-ABDOMEN FOR TUBE PLACEMENT   Final Result      1.  NG tube coiled in the fundus of the stomach.      DX-ABDOMEN FOR TUBE PLACEMENT   Final Result      Cortrak feeding tube coiled in fundus of stomach.      DX-ABDOMEN FOR TUBE PLACEMENT   Final Result      1.  Stable appearance of the enteric tube which is looped in the distal stomach and proximal duodenum with the tip directed cephalad at the GE junction.      DX-ABDOMEN FOR TUBE PLACEMENT   Final Result      Enteric feeding tube appears to be kinked in the expected location of the second portion of the duodenum with the tip terminating in the left upper quadrant, likely intragastric.      MR-MRA HEAD-W/O   Final Result         1.  Marked motion artifact but grossly normal MR angiogram of the [place].      MR-MRA NECK-W/O   Final Result      1.  Normal MR angiogram of the carotid arteries and vertebral basilar system..      DX-CHEST-PORTABLE (1 VIEW)   Final Result      1.  No acute cardiac or pulmonary abnormalities are identified.   2.  NG tube side-port projects at the gastroesophageal junction. Recommend advancing.      CT-HEAD W/O   Final Result      1.  BILATERAL frontal subarachnoid blood   2.  Atrophy      Based solely on CT findings, the brain injury guideline category is mBIG 2.      Nondisplaced skull fx   SDH 4.1-7.9mm   IPH 4.1-7.9mm   SAH 1 hemisphere, >3 sulci, 1-3mm      The original BIG retrospective analysis found radiographic progression in 0% of BIG 1 patients and 2.6% BIG 2.         Findings were communicated with and acknowledged by JEREMY M GONDA via [employer] on 11/4/2022 4:35 PM.      DX-ABDOMEN FOR TUBE  PLACEMENT   Final Result      Enteric tube placement which appears intragastric.      DX-CHEST-PORTABLE (1 VIEW)   Final Result         1.  No acute cardiopulmonary disease.   2.  Trace left pleural effusion      DX-CHEST-PORTABLE (1 VIEW)   Final Result      1.  Tubes and lines in good position.      2.  11 mm nodule in left mid chest.      DX-CHEST-PORTABLE (1 VIEW)   Final Result      1.  No acute cardiopulmonary abnormality.   2.  15 mm left upper lung nodule which is nonspecific. Further evaluation with chest CT may be performed.   3.  No consolidation or pleural effusion.   4.  Nasogastric tube side port is at the GE junction. Recommend advancement.             Assessment/Plan  * Acute metabolic encephalopathy  Assessment & Plan  Possibly due to oxycodone overdose and SAH    EEG consistent with encephalopathy no seizures noted  Ammonia normal  MRI trace bilateral subarachnoid blood and possible right frontal lobe lesion per radiologist suspected to be artifact secondary to motion but cannot rule out metastatic lesion    Avoid sedating agents frequent orientation  Consider repeating MRI with contrast when more clinically stable    Elevated troponin  Assessment & Plan  Likely demand ischemia  Echocardiogram with normal EF no regional wall motion abnormalities and hyperdynamic LV     Continue metoprolol       Seizure (HCC)  Assessment & Plan  Continue Keppra  EEGrevealed encephalopathy no active seizures  MRI?  Right frontal lobe lesion versus artifact  Repeat MRI with contrast when more clinically stable    Metastatic breast cancer (HCC)  Assessment & Plan  Follow-up with oncology after discharge    Dysphagia  Assessment & Plan      SLP and aspiration precautions  Continue thiamine and monitor electrolytes  Advance diet per SLP    Primary hypertension  Assessment & Plan  Controlled on amlodipine continue to monitor    Hypernatremia  Assessment & Plan  Continue D5W  Encourage oral  hydration    Diarrhea  Assessment & Plan  C diff was neg  abd exam benign  Imodium PRN  Reviewed prior records under different MRN patient hospitalized at [facility] evaluated by GI Dr. Pate with EGD and colonoscopy pathology with lymphocytic infiltration felt to be secondary to her breast cancer therapy    Continue Imodium we will add cholestyramine  11/17: Prednisone 40 BID until improving then taper after for 4-6 weeks    Subarachnoid hemorrhage (HCC)  Assessment & Plan  MRA head and neck was negative for aneurysm  Likely traumatic only trace blood noted on MRI        Aspiration pneumonia ([facility])  Assessment & Plan  Continue Unasyn will complete 5-day course on 11/16/2022  Monitor CBC and follow-up on urinalysis and repeat cultures    Hypokalemia  Assessment & Plan  Severe likely secondary to chronic GI losses    Replete and monitor    AYDEE (acute kidney injury) ([facility])  Assessment & Plan  Worsening serum creatinine suspect prerenal     Renal ultrasound ordered and reviewed with no hydronephrosis  Check urine sodium  Increase p.o. bicarb for metabolic acidosis  Encourage p.o. hydration  IV fluid bolus and continue D5W infusion discussed with nursing staff IV being replaced    Sepsis ([facility])  Assessment & Plan  Chest11/7 Wbc:18.4  Monitor vitals and labs  C/O aspiration pneumonia  11/4 Cdiff negative  11/3 Blood cultures no growth.  11/3 resp culture with MSSA  Previously completed antibiotic course for MSSA pneumonia monitor off antibiotics    On Unasyn for suspected aspiration pneumonia  Worsening leukocytosis however overall clinically improved  Will continue Unasyn repeat blood cultures, check urinalysis  Monitor clinically and monitor CBC    Acute respiratory failure with hypoxia ([facility])  Assessment & Plan  Extubated 11/5  Previously completed antibiotic therapy for MSSA pneumonia  Given hypoxia and worsening leukocytosis started on Unasyn on 11/11/2022 for suspected aspiration pneumonia   Continue Unasyn and  monitor clinically  Given worsening leukocytosis will check UA and blood cultures       VTE prophylaxis: SCDs/TEDs    I have performed a physical exam and reviewed and updated ROS and Plan today (11/17/2022). In review of yesterday's note (11/16/2022), there are no changes except as documented above.

## 2022-11-17 NOTE — PROGRESS NOTES
Contacted On-call hospitalist regarding Pt's potassium at 2.8. PO potassium, IV potassium, and IV magnesium orders placed. Pt medicated per MAR.     0430 - Pt was vomiting. Medicated with Zofran. On-call hospitalist notified.

## 2022-11-17 NOTE — PROGRESS NOTES
4 Eyes Skin Assessment Completed by TETO torres and TETO Lyle.    Head WDL  Ears WDL  Nose WDL  Mouth WDL  Neck WDL  Breast/Chest WDL  Shoulder Blades WDL  Spine WDL  (R) Arm/Elbow/Hand WDL  (L) Arm/Elbow/Hand WDL  Abdomen WDL  Groin Redness, Blanching, and Excoriation  Scrotum/Coccyx/Buttocks Redness, Blanching, and Excoriation  (R) Leg WDL  (L) Leg WDL  (R) Heel/Foot/Toe WDL  (L) Heel/Foot/Toe Scab          Devices In Places Tele Box, Blood Pressure Cuff, Pulse Ox, SCD's, Central Line, and BMS      Interventions In Place Waffle Overlay, Pillows, Barrier Cream, and Dri-Pancho Pads    Possible Skin Injury No    Pictures Uploaded Into Epic Yes  Wound Consult Placed N/A  RN Wound Prevention Protocol Ordered Yes

## 2022-11-17 NOTE — THERAPY
Missed Therapy     Patient Name: Ashleigh Marquis  Age:  61 y.o., Sex:  female  Medical Record #: [id number]  Today's Date: 11/17/2022    Discussed missed therapy with RN.       11/17/22 1038   Treatment Variance   Reason For Missed Therapy Non-Medical - Other (Please Comment)  (Pt transferring floors at this time.)   Interdisciplinary Plan of Care Collaboration   IDT Collaboration with  Nursing  (RN, Charge RN)   Collaboration Comments Attempted to see pt for dysphagia tx in the presence of poor tolerance of current diet per RN, per EMR. Discussed with charge RN, who reported that pt is transferring to a new floor and is thus unavailabe. SLP will follow as time allows folllowing room change.

## 2022-11-17 NOTE — CARE PLAN
Problem: Knowledge Deficit - Standard  Goal: Patient and family/care givers will demonstrate understanding of plan of care, disease process/condition, diagnostic tests and medications  Outcome: Progressing     Problem: Pain - Standard  Goal: Alleviation of pain or a reduction in pain to the patient’s comfort goal  Outcome: Progressing     Problem: Skin Integrity  Goal: Skin integrity is maintained or improved  Outcome: Progressing     Problem: Fall Risk  Goal: Patient will remain free from falls  Outcome: Progressing   The patient is Stable - Low risk of patient condition declining or worsening    Shift Goals  Clinical Goals: stable labs, decrease diarrhea, mobilize  Patient Goals: corina  Family Goals: corina    Progress made toward(s) clinical / shift goals:  denies pain. Skin and fall precautions in place.    Patient is not progressing towards the following goals:

## 2022-11-17 NOTE — PROGRESS NOTES
Pt reporting pain to back/chest, Dr. Ochoa notified. VSS. Tele monitor SR 83. Episode of emesis x1, zofran given.

## 2022-11-17 NOTE — PROGRESS NOTES
Monitor Summary: SR 77-92, ID -0.15, QRS -0.07, QT -0.49, with 10 secs PSVT, per strip from the monitor room.

## 2022-11-17 NOTE — DISCHARGE PLANNING
LMSW participated in interdisciplinary rounds. Patient requiring ongoing medical treatment to manage diarrhea. Per hospitalist, diarrhea has improved slightly today. Other current barriers to placement include Cortak and rectal tube.     PLAN: Resend referrals once Cortak and rectal tube are removed, and when diarrhea resolves.

## 2022-11-17 NOTE — PROGRESS NOTES
Assumed patient care around 1900. Patient is alert and oriented x 4. Denies pain or discomfort. Bed in lowest position and locked. Call light within reach and bed alarm is set. Hourly rounding continues.

## 2022-11-18 NOTE — DIETARY
Nutrition Services Brief Update:    Day 14 of admit.  Ashleigh Marquis is a 61 y.o. female with admitting DX of Narcotic overdose, accidental or unintentional, initial encounter ([facility]) [T40.601A]    Current Diet: Level 3-liquidized, lactose free w/ Level 2 - mildly thick fluids. 1:1 supervision by nursing.    Recorded PO intake is poor at  0 to 50-75% w/ avg of 23%. Pt has loose stools and order for lactose free diet. Gunter Boost VHC is lactose free, high kcal, high protein supplement. This will be added TID    Problem: Nutritional:  Goal: Achieve adequate nutritional intake  Description: Patient will consume >50% of meals  Outcome: no met    RD will follow.

## 2022-11-18 NOTE — PROGRESS NOTES
Monitor Summary: SR 75-96, VA -0.12, QRS -0.08, QT -0.27, with rare PVCs, per strip from the monitor room.

## 2022-11-18 NOTE — THERAPY
Physical Therapy   Daily Treatment     Patient Name: Ashleigh Marquis  Age:  61 y.o., Sex:  female  Medical Record #: [id number]  Today's Date: 11/17/2022     Precautions  Precautions: Fall Risk;Swallow Precautions ( See Comments)  Comments: MAP> 65 and SBP<140    Assessment    Pt greeted and agreed to therapy. Pt performed supine>sit, HOB flat w/ SPV. Pt STS x2 w/ CGA and transferred to chair by taking a few steps, Yoni w/ physical assist from therapist, pt req'd direction on hips when sitting. Pt declined further therapy. Pt continues to be limited by impaired cognition, decrease balance, and poor initiation. Pt will continue to benefit from skilled PT to address deficits.     Plan    Continue current treatment plan.    DC Equipment Recommendations: Unable to determine at this time  Discharge Recommendations: Recommend post-acute placement for additional physical therapy services prior to discharge home       11/17/22 1449   Balance   Sitting Balance (Static) Fair   Sitting Balance (Dynamic) Fair -   Standing Balance (Static) Poor +   Standing Balance (Dynamic) Poor +   Weight Shift Sitting Fair   Weight Shift Standing Poor   Skilled Intervention Verbal Cuing;Sequencing;Tactile Cuing;Postural Facilitation   Comments physical assist when standing   Gait Analysis   Gait Level Of Assist Minimal Assist   Assistive Device   (physical assist)   Distance (Feet) 2   # of Times Distance was Traveled 1   Deviation Shuffled Gait;Bradykinetic   Skilled Intervention Postural Facilitation;Verbal Cuing;Sequencing   Comments Pt able to take a few steps to transfer to chair   Bed Mobility    Supine to Sit Supervised   Scooting Supervised   Rolling Supervised   Skilled Intervention Verbal Cuing;Sequencing;Tactile Cuing   Comments impulsive, HOB flat   Functional Mobility   Sit to Stand Contact Guard Assist   Bed, Chair, Wheelchair Transfer Minimal Assist   Transfer Method Stand Step   Mobility EOB> chair   Skilled Intervention  Verbal Cuing;Sequencing   Comments pt refused further therapy after transfer   Short Term Goals    Short Term Goal # 1 Pt will perform supine <> sit with SPV in 6 visits to return to PLOF   Goal Outcome # 1 Goal met   Short Term Goal # 2 Pt will perform STS tranfsers with FWW and SPV in 6 visits to improve functional independence   Goal Outcome # 2 Goal not met   Short Term Goal # 3 Pt will ambulate 50ft with FWW and CGA in 6 visits to initiate gait training   Goal Outcome # 3 Goal not met   Supervising Physical Therapist (PTA Treatments Only)   Supervising Physical Therapist Susy Hess

## 2022-11-19 NOTE — PROGRESS NOTES
Hospital Medicine Daily Progress Note    Date of Service  11/19/2022    Chief Complaint  Found unresponsive    Hospital Course  Ashleigh Marquis is a 61 y.o. female with a history of breast cancer, chronic pain on oxycodone.  She was found unresponsive by a .  Paramedics arrived placed her on 15 L nonrebreather mask patient was tachycardic with heart rate in the 190s, she was hypothermic 93 °F.  Per report she was given Narcan and had some response.  Admitted 11/3/2022 with acute encephalopathy.  Labs here at the hospital showed acute kidney injury, hypokalemia, metabolic acidosis and concern of sepsis.  Patient was initially intubated placed on bicarb started on pressor support as well as broad-spectrum antibiotics of Zosyn and linezolid.  CT of the head showed by frontal subarachnoid hemorrhages.  MRA of the head and neck was negative for aneurysm.  The subarachnoid hemorrhage was felt due to be from trauma.  Patient was extubated 11/5 and weaned off of pressors.  Patient did grow out MSSA from her sputum antibiotics were adjusted to Unasyn.    Interval Problem Update  Patient diet advanced after fees to liquid diet.  Patient with persistent diarrhea we will discontinue the Imodium and start Lomotil.  Also add lactose-free to diet.  Core track will be discontinued.  11/16: Patient with persistent diarrhea.  Still no improvement with Lomotil.  She is also frequently refusing her medications.  11/17: Patient started on steroids yesterday for diarrhea she was shown improvement.  Anticipate she will be able to have her FMS removed in a couple days.  Replacing electrolytes.  11/18: Diarrhea still happening but slightly improved.  Potassium repleted now at 3.8.  Slight increase in the patient's creatinine to 2.4.  monitor off IV fluids, encourage PO fluids  11/19: Diarrhea resolved patient with occasional loose stools.  Patient is pending placement. Labs pending    I have discussed this patient's plan of  care and discharge plan at IDT rounds today with Case Management, Nursing, Nursing leadership, and other members of the IDT team.    Consultants/Specialty  critical care    Code Status  DNAR/DNI    Disposition  Patient is not medically cleared for discharge.   Anticipate discharge to  be determined .  I have placed the appropriate orders for post-discharge needs.    Review of Systems  Review of Systems   Constitutional:  Negative for chills and fever.   Respiratory:  Negative for shortness of breath.    Cardiovascular:  Negative for chest pain.   Gastrointestinal:  Negative for abdominal pain and diarrhea.   All other systems reviewed and are negative.     Physical Exam  Temp:  [36.2 °C (97.1 °F)-36.6 °C (97.8 °F)] 36.3 °C (97.4 °F)  Pulse:  [] 92  Resp:  [16-18] 18  BP: (117-140)/(77-94) 122/94  SpO2:  [95 %-100 %] 100 %    Physical Exam  Vitals and nursing note reviewed.   Constitutional:       General: She is not in acute distress.     Appearance: She is ill-appearing.   HENT:      Head: Normocephalic and atraumatic.      Mouth/Throat:      Pharynx: No oropharyngeal exudate or posterior oropharyngeal erythema.   Eyes:      General: No scleral icterus.        Right eye: No discharge.         Left eye: No discharge.   Cardiovascular:      Rate and Rhythm: Normal rate and regular rhythm.      Heart sounds: Normal heart sounds. No murmur heard.    No friction rub. No gallop.   Pulmonary:      Effort: Pulmonary effort is normal. No respiratory distress.      Breath sounds: No stridor. Rhonchi present. No rales.   Chest:      Chest wall: Tenderness present.   Abdominal:      General: Bowel sounds are normal. There is no distension.      Palpations: Abdomen is soft. There is no mass.      Tenderness: There is no abdominal tenderness. There is no rebound.   Musculoskeletal:         General: Swelling present. No tenderness.      Cervical back: Neck supple. No rigidity.   Skin:     General: Skin is warm and dry.       Coloration: Skin is not cyanotic or jaundiced.      Nails: There is no clubbing.   Neurological:      General: No focal deficit present.      Mental Status: She is alert.      Cranial Nerves: No cranial nerve deficit.      Motor: No weakness.      Comments: Oriented to self and place   Psychiatric:         Speech: Speech is delayed.         Cognition and Memory: She exhibits impaired recent memory.       Fluids    Intake/Output Summary (Last 24 hours) at 11/19/2022 1435  Last data filed at 11/19/2022 1124  Gross per 24 hour   Intake 327 ml   Output --   Net 327 ml         Laboratory  Recent Labs     11/17/22  0302 11/18/22  1010   WBC 11.4* 18.6*   RBC 3.54* 3.43*   HEMOGLOBIN 11.2* 10.9*   HEMATOCRIT 33.6* 33.2*   MCV 94.9 96.8   MCH 31.6 31.8   MCHC 33.3* 32.8*   RDW 50.4* 51.7*   PLATELETCT 233 291   MPV 12.3 11.5       Recent Labs     11/16/22  1722 11/17/22  0302 11/18/22  1010   SODIUM  --  136 134*   POTASSIUM 2.8* 3.1* 3.8   CHLORIDE  --  104 105   CO2  --  16* 14*   GLUCOSE  --  285* 202*   BUN  --  45* 52*   CREATININE  --  1.99* 2.40*   CALCIUM  --  9.5 9.3                       Imaging  US-RENAL   Final Result      1.  Normal renal ultrasound.      DX-ABDOMEN FOR TUBE PLACEMENT   Final Result         Gastric drainage tube with tip projecting over the expected area of the first portion duodenum.      MR-BRAIN-WITH & W/O   Final Result      1.  Trace BILATERAL subarachnoid blood redemonstrated without appreciable change   2.  Possible RIGHT frontal lobe lesion with internal hemorrhage though I suspect this is imaging artifact related to motion. A metastatic lesion could have this appearance. Recommend repeating the contrast enhanced sequences when the patient may be    sedated or otherwise better able to remain still.   3.  Atrophy   4.  No evidence of acute ischemia, suggestion of other mass or other hemorrhage      EC-ECHOCARDIOGRAM COMPLETE W/ CONT   Final Result      CT-HEAD W/O   Final Result       Decreased trace subarachnoid hemorrhage in the bilateral frontal vertices. No new acute intracranial hemorrhage.         DX-CHEST-PORTABLE (1 VIEW)   Final Result      1.  Bilateral perihilar interstitial and early alveolar opacities, favoring edema over pneumonia. Still, pneumonia can be considered in the appropriate clinical settings.   2.  No lobar consolidation. No large pleural effusions.      DX-ABDOMEN FOR TUBE PLACEMENT   Final Result      1.  Feeding tube tip overlies the distal gastric body.      DX-ABDOMEN FOR TUBE PLACEMENT   Final Result      1.  NG tube coiled in the fundus of the stomach.      DX-ABDOMEN FOR TUBE PLACEMENT   Final Result      Cortrak feeding tube coiled in fundus of stomach.      DX-ABDOMEN FOR TUBE PLACEMENT   Final Result      1.  Stable appearance of the enteric tube which is looped in the distal stomach and proximal duodenum with the tip directed cephalad at the GE junction.      DX-ABDOMEN FOR TUBE PLACEMENT   Final Result      Enteric feeding tube appears to be kinked in the expected location of the second portion of the duodenum with the tip terminating in the left upper quadrant, likely intragastric.      MR-MRA HEAD-W/O   Final Result         1.  Marked motion artifact but grossly normal MR angiogram of the [place].      MR-MRA NECK-W/O   Final Result      1.  Normal MR angiogram of the carotid arteries and vertebral basilar system..      DX-CHEST-PORTABLE (1 VIEW)   Final Result      1.  No acute cardiac or pulmonary abnormalities are identified.   2.  NG tube side-port projects at the gastroesophageal junction. Recommend advancing.      CT-HEAD W/O   Final Result      1.  BILATERAL frontal subarachnoid blood   2.  Atrophy      Based solely on CT findings, the brain injury guideline category is mBIG 2.      Nondisplaced skull fx   SDH 4.1-7.9mm   IPH 4.1-7.9mm   SAH 1 hemisphere, >3 sulci, 1-3mm      The original BIG retrospective analysis found radiographic  progression in 0% of BIG 1 patients and 2.6% BIG 2.         Findings were communicated with and acknowledged by JEREMY M GONDA via [employer] on 11/4/2022 4:35 PM.      DX-ABDOMEN FOR TUBE PLACEMENT   Final Result      Enteric tube placement which appears intragastric.      DX-CHEST-PORTABLE (1 VIEW)   Final Result         1.  No acute cardiopulmonary disease.   2.  Trace left pleural effusion      DX-CHEST-PORTABLE (1 VIEW)   Final Result      1.  Tubes and lines in good position.      2.  11 mm nodule in left mid chest.      DX-CHEST-PORTABLE (1 VIEW)   Final Result      1.  No acute cardiopulmonary abnormality.   2.  15 mm left upper lung nodule which is nonspecific. Further evaluation with chest CT may be performed.   3.  No consolidation or pleural effusion.   4.  Nasogastric tube side port is at the GE junction. Recommend advancement.             Assessment/Plan  * Acute metabolic encephalopathy  Assessment & Plan  Possibly due to oxycodone overdose and SAH    EEG consistent with encephalopathy no seizures noted  Ammonia normal  MRI trace bilateral subarachnoid blood and possible right frontal lobe lesion per radiologist suspected to be artifact secondary to motion but cannot rule out metastatic lesion    Avoid sedating agents frequent orientation  Consider repeating MRI with contrast when more clinically stable    Elevated troponin  Assessment & Plan  Likely demand ischemia  Echocardiogram with normal EF no regional wall motion abnormalities and hyperdynamic LV     Continue metoprolol       Seizure (HCC)  Assessment & Plan  Continue Keppra  EEGrevealed encephalopathy no active seizures  MRI?  Right frontal lobe lesion versus artifact  Repeat MRI with contrast when more clinically stable    Metastatic breast cancer (HCC)  Assessment & Plan  Follow-up with oncology after discharge    Dysphagia  Assessment & Plan      SLP and aspiration precautions  Continue thiamine and monitor electrolytes  Advance diet per  SLP    Primary hypertension  Assessment & Plan  Controlled on amlodipine continue to monitor    Hypernatremia  Assessment & Plan  Continue D5W  Encourage oral hydration    Diarrhea  Assessment & Plan  C diff was neg  abd exam benign  Imodium PRN  Reviewed prior records under different MRN patient hospitalized at [facility] evaluated by GI Dr. Pate with EGD and colonoscopy pathology with lymphocytic infiltration felt to be secondary to her breast cancer therapy    Continue Imodium we will add cholestyramine  11/17: Prednisone 40 BID until improving then taper after for 4-6 weeks    Subarachnoid hemorrhage (HCC)  Assessment & Plan  MRA head and neck was negative for aneurysm  Likely traumatic only trace blood noted on MRI        Aspiration pneumonia (HCC)  Assessment & Plan  Continue Unasyn will complete 5-day course on 11/16/2022  Monitor CBC and follow-up on urinalysis and repeat cultures    Hypokalemia  Assessment & Plan  Severe likely secondary to chronic GI losses    Replete and monitor    AYDEE (acute kidney injury) ([facility])  Assessment & Plan  Worsening serum creatinine suspect prerenal     Renal ultrasound ordered and reviewed with no hydronephrosis  Check urine sodium  Increase p.o. bicarb for metabolic acidosis  Encourage p.o. hydration  IV fluid bolus and continue D5W infusion discussed with nursing staff IV being replaced    Sepsis ([facility])  Assessment & Plan  Chest11/7 Wbc:18.4  Monitor vitals and labs  C/O aspiration pneumonia  11/4 Cdiff negative  11/3 Blood cultures no growth.  11/3 resp culture with MSSA  Previously completed antibiotic course for MSSA pneumonia monitor off antibiotics    On Unasyn for suspected aspiration pneumonia  Worsening leukocytosis however overall clinically improved  Will continue Unasyn repeat blood cultures, check urinalysis  Monitor clinically and monitor CBC    Acute respiratory failure with hypoxia ([facility])  Assessment & Plan  Extubated 11/5  Previously completed antibiotic  therapy for MSSA pneumonia  Given hypoxia and worsening leukocytosis started on Unasyn on 11/11/2022 for suspected aspiration pneumonia   Continue Unasyn and monitor clinically  Given worsening leukocytosis will check UA and blood cultures         VTE prophylaxis: SCDs/TEDs    I have performed a physical exam and reviewed and updated ROS and Plan today (11/19/2022). In review of yesterday's note (11/18/2022), there are no changes except as documented above.

## 2022-11-19 NOTE — PROGRESS NOTES
Assumed care of patient. AO x2. With complaints of pain 4on a scale of (0 to 10). Routine assessment done. Vital signs within normal limits. Call light within reach and personal belongings within reach. Bed locked and in lowest position. Policies and procedures reinforced patient verbalized understanding.Treaded socks in place. Will continue to monitor.

## 2022-11-19 NOTE — PROGRESS NOTES
Hospital Medicine Daily Progress Note    Date of Service  11/18/2022    Chief Complaint  Found unresponsive    Hospital Course  Ashleigh Marquis is a 61 y.o. female with a history of breast cancer, chronic pain on oxycodone.  She was found unresponsive by a .  Paramedics arrived placed her on 15 L nonrebreather mask patient was tachycardic with heart rate in the 190s, she was hypothermic 93 °F.  Per report she was given Narcan and had some response.  Admitted 11/3/2022 with acute encephalopathy.  Labs here at the hospital showed acute kidney injury, hypokalemia, metabolic acidosis and concern of sepsis.  Patient was initially intubated placed on bicarb started on pressor support as well as broad-spectrum antibiotics of Zosyn and linezolid.  CT of the head showed by frontal subarachnoid hemorrhages.  MRA of the head and neck was negative for aneurysm.  The subarachnoid hemorrhage was felt due to be from trauma.  Patient was extubated 11/5 and weaned off of pressors.  Patient did grow out MSSA from her sputum antibiotics were adjusted to Unasyn.    Interval Problem Update  Patient diet advanced after fees to liquid diet.  Patient with persistent diarrhea we will discontinue the Imodium and start Lomotil.  Also add lactose-free to diet.  Core track will be discontinued.  11/16: Patient with persistent diarrhea.  Still no improvement with Lomotil.  She is also frequently refusing her medications.  11/17: Patient started on steroids yesterday for diarrhea she was shown improvement.  Anticipate she will be able to have her FMS removed in a couple days.  Replacing electrolytes.  11/18: Diarrhea still happening but slightly improved.  Potassium repleted now at 3.8.  Slight increase in the patient's creatinine to 2.4.  monitor off IV fluids, encourage PO fluids    I have discussed this patient's plan of care and discharge plan at IDT rounds today with Case Management, Nursing, Nursing leadership, and other  members of the IDT team.    Consultants/Specialty  critical care    Code Status  DNAR/DNI    Disposition  Patient is not medically cleared for discharge.   Anticipate discharge to  be determined .  I have placed the appropriate orders for post-discharge needs.    Review of Systems  Review of Systems   Constitutional:  Negative for chills and fever.   Respiratory:  Negative for shortness of breath.    Cardiovascular:  Negative for chest pain.   Gastrointestinal:  Negative for abdominal pain and diarrhea.   All other systems reviewed and are negative.     Physical Exam  Temp:  [36.2 °C (97.2 °F)-36.6 °C (97.8 °F)] 36.6 °C (97.8 °F)  Pulse:  [] 100  Resp:  [18] 18  BP: (116-145)/(66-92) 137/85  SpO2:  [95 %-100 %] 95 %    Physical Exam  Vitals and nursing note reviewed.   Constitutional:       General: She is not in acute distress.     Appearance: She is ill-appearing.   HENT:      Head: Normocephalic and atraumatic.      Mouth/Throat:      Pharynx: No oropharyngeal exudate or posterior oropharyngeal erythema.   Eyes:      General: No scleral icterus.        Right eye: No discharge.         Left eye: No discharge.   Cardiovascular:      Rate and Rhythm: Normal rate and regular rhythm.      Heart sounds: Normal heart sounds. No murmur heard.    No friction rub. No gallop.   Pulmonary:      Effort: Pulmonary effort is normal. No respiratory distress.      Breath sounds: No stridor. Rhonchi present. No rales.   Chest:      Chest wall: Tenderness present.   Abdominal:      General: Bowel sounds are normal. There is no distension.      Palpations: Abdomen is soft. There is no mass.      Tenderness: There is no abdominal tenderness. There is no rebound.   Musculoskeletal:         General: Swelling present. No tenderness.      Cervical back: Neck supple. No rigidity.   Skin:     General: Skin is warm and dry.      Coloration: Skin is not cyanotic or jaundiced.      Nails: There is no clubbing.   Neurological:       General: No focal deficit present.      Mental Status: She is alert.      Cranial Nerves: No cranial nerve deficit.      Motor: No weakness.      Comments: Oriented to self and place   Psychiatric:         Speech: Speech is delayed.         Cognition and Memory: She exhibits impaired recent memory.       Fluids    Intake/Output Summary (Last 24 hours) at 11/18/2022 1611  Last data filed at 11/17/2022 1700  Gross per 24 hour   Intake 0 ml   Output --   Net 0 ml         Laboratory  Recent Labs     11/16/22  0420 11/17/22  0302 11/18/22  1010   WBC 11.5* 11.4* 18.6*   RBC 3.18* 3.54* 3.43*   HEMOGLOBIN 10.2* 11.2* 10.9*   HEMATOCRIT 30.2* 33.6* 33.2*   MCV 95.0 94.9 96.8   MCH 32.1 31.6 31.8   MCHC 33.8 33.3* 32.8*   RDW 50.1* 50.4* 51.7*   PLATELETCT 169 233 291   MPV 12.1 12.3 11.5       Recent Labs     11/16/22  0420 11/16/22  1722 11/17/22  0302 11/18/22  1010   SODIUM 139  --  136 134*   POTASSIUM 2.1* 2.8* 3.1* 3.8   CHLORIDE 106  --  104 105   CO2 16*  --  16* 14*   GLUCOSE 124*  --  285* 202*   BUN 48*  --  45* 52*   CREATININE 1.74*  --  1.99* 2.40*   CALCIUM 8.9  --  9.5 9.3                       Imaging  US-RENAL   Final Result      1.  Normal renal ultrasound.      DX-ABDOMEN FOR TUBE PLACEMENT   Final Result         Gastric drainage tube with tip projecting over the expected area of the first portion duodenum.      MR-BRAIN-WITH & W/O   Final Result      1.  Trace BILATERAL subarachnoid blood redemonstrated without appreciable change   2.  Possible RIGHT frontal lobe lesion with internal hemorrhage though I suspect this is imaging artifact related to motion. A metastatic lesion could have this appearance. Recommend repeating the contrast enhanced sequences when the patient may be    sedated or otherwise better able to remain still.   3.  Atrophy   4.  No evidence of acute ischemia, suggestion of other mass or other hemorrhage      EC-ECHOCARDIOGRAM COMPLETE W/ CONT   Final Result      CT-HEAD W/O   Final  Result      Decreased trace subarachnoid hemorrhage in the bilateral frontal vertices. No new acute intracranial hemorrhage.         DX-CHEST-PORTABLE (1 VIEW)   Final Result      1.  Bilateral perihilar interstitial and early alveolar opacities, favoring edema over pneumonia. Still, pneumonia can be considered in the appropriate clinical settings.   2.  No lobar consolidation. No large pleural effusions.      DX-ABDOMEN FOR TUBE PLACEMENT   Final Result      1.  Feeding tube tip overlies the distal gastric body.      DX-ABDOMEN FOR TUBE PLACEMENT   Final Result      1.  NG tube coiled in the fundus of the stomach.      DX-ABDOMEN FOR TUBE PLACEMENT   Final Result      Cortrak feeding tube coiled in fundus of stomach.      DX-ABDOMEN FOR TUBE PLACEMENT   Final Result      1.  Stable appearance of the enteric tube which is looped in the distal stomach and proximal duodenum with the tip directed cephalad at the GE junction.      DX-ABDOMEN FOR TUBE PLACEMENT   Final Result      Enteric feeding tube appears to be kinked in the expected location of the second portion of the duodenum with the tip terminating in the left upper quadrant, likely intragastric.      MR-MRA HEAD-W/O   Final Result         1.  Marked motion artifact but grossly normal MR angiogram of the [place].      MR-MRA NECK-W/O   Final Result      1.  Normal MR angiogram of the carotid arteries and vertebral basilar system..      DX-CHEST-PORTABLE (1 VIEW)   Final Result      1.  No acute cardiac or pulmonary abnormalities are identified.   2.  NG tube side-port projects at the gastroesophageal junction. Recommend advancing.      CT-HEAD W/O   Final Result      1.  BILATERAL frontal subarachnoid blood   2.  Atrophy      Based solely on CT findings, the brain injury guideline category is mBIG 2.      Nondisplaced skull fx   SDH 4.1-7.9mm   IPH 4.1-7.9mm   SAH 1 hemisphere, >3 sulci, 1-3mm      The original BIG retrospective analysis found  radiographic progression in 0% of BIG 1 patients and 2.6% BIG 2.         Findings were communicated with and acknowledged by JEREMY M GONDA via [employer] on 11/4/2022 4:35 PM.      DX-ABDOMEN FOR TUBE PLACEMENT   Final Result      Enteric tube placement which appears intragastric.      DX-CHEST-PORTABLE (1 VIEW)   Final Result         1.  No acute cardiopulmonary disease.   2.  Trace left pleural effusion      DX-CHEST-PORTABLE (1 VIEW)   Final Result      1.  Tubes and lines in good position.      2.  11 mm nodule in left mid chest.      DX-CHEST-PORTABLE (1 VIEW)   Final Result      1.  No acute cardiopulmonary abnormality.   2.  15 mm left upper lung nodule which is nonspecific. Further evaluation with chest CT may be performed.   3.  No consolidation or pleural effusion.   4.  Nasogastric tube side port is at the GE junction. Recommend advancement.             Assessment/Plan  * Acute metabolic encephalopathy  Assessment & Plan  Possibly due to oxycodone overdose and SAH    EEG consistent with encephalopathy no seizures noted  Ammonia normal  MRI trace bilateral subarachnoid blood and possible right frontal lobe lesion per radiologist suspected to be artifact secondary to motion but cannot rule out metastatic lesion    Avoid sedating agents frequent orientation  Consider repeating MRI with contrast when more clinically stable    Elevated troponin  Assessment & Plan  Likely demand ischemia  Echocardiogram with normal EF no regional wall motion abnormalities and hyperdynamic LV     Continue metoprolol       Seizure (HCC)  Assessment & Plan  Continue Keppra  EEGrevealed encephalopathy no active seizures  MRI?  Right frontal lobe lesion versus artifact  Repeat MRI with contrast when more clinically stable    Metastatic breast cancer (HCC)  Assessment & Plan  Follow-up with oncology after discharge    Dysphagia  Assessment & Plan      SLP and aspiration precautions  Continue thiamine and monitor electrolytes  Advance  diet per SLP    Primary hypertension  Assessment & Plan  Controlled on amlodipine continue to monitor    Hypernatremia  Assessment & Plan  Continue D5W  Encourage oral hydration    Diarrhea  Assessment & Plan  C diff was neg  abd exam benign  Imodium PRN  Reviewed prior records under different MRN patient hospitalized at [facility] evaluated by GI Dr. Pate with EGD and colonoscopy pathology with lymphocytic infiltration felt to be secondary to her breast cancer therapy    Continue Imodium we will add cholestyramine  11/17: Prednisone 40 BID until improving then taper after for 4-6 weeks    Subarachnoid hemorrhage (HCC)  Assessment & Plan  MRA head and neck was negative for aneurysm  Likely traumatic only trace blood noted on MRI        Aspiration pneumonia (HCC)  Assessment & Plan  Continue Unasyn will complete 5-day course on 11/16/2022  Monitor CBC and follow-up on urinalysis and repeat cultures    Hypokalemia  Assessment & Plan  Severe likely secondary to chronic GI losses    Replete and monitor    AYDEE (acute kidney injury) ([facility])  Assessment & Plan  Worsening serum creatinine suspect prerenal     Renal ultrasound ordered and reviewed with no hydronephrosis  Check urine sodium  Increase p.o. bicarb for metabolic acidosis  Encourage p.o. hydration  IV fluid bolus and continue D5W infusion discussed with nursing staff IV being replaced    Sepsis ([facility])  Assessment & Plan  Chest11/7 Wbc:18.4  Monitor vitals and labs  C/O aspiration pneumonia  11/4 Cdiff negative  11/3 Blood cultures no growth.  11/3 resp culture with MSSA  Previously completed antibiotic course for MSSA pneumonia monitor off antibiotics    On Unasyn for suspected aspiration pneumonia  Worsening leukocytosis however overall clinically improved  Will continue Unasyn repeat blood cultures, check urinalysis  Monitor clinically and monitor CBC    Acute respiratory failure with hypoxia ([facility])  Assessment & Plan  Extubated 11/5  Previously completed  antibiotic therapy for MSSA pneumonia  Given hypoxia and worsening leukocytosis started on Unasyn on 11/11/2022 for suspected aspiration pneumonia   Continue Unasyn and monitor clinically  Given worsening leukocytosis will check UA and blood cultures         VTE prophylaxis: SCDs/TEDs    I have performed a physical exam and reviewed and updated ROS and Plan today (11/18/2022). In review of yesterday's note (11/17/2022), there are no changes except as documented above.

## 2022-11-19 NOTE — THERAPY
Missed Therapy     Patient Name: Ashleigh Marquis  Age:  61 y.o., Sex:  female  Medical Record #: [id number]  Today's Date: 11/18/2022 11/18/22 1054   Treatment Variance   Reason For Missed Therapy Non-Medical - Patient Refused   Interdisciplinary Plan of Care Collaboration   IDT Collaboration with  Nursing;Physical Therapist   Patient Position at End of Therapy In Bed;Bed Alarm On;Call Light within Reach;Tray Table within Reach   Collaboration Comments pt refused therapy   Session Information   Date / Session Number  11/17-7 (3/4, 11/20) CD/2 Attempt 11/18   Supervising Physical Therapist (PTA Treatments Only)   Supervising Physical Therapist Val Dave

## 2022-11-19 NOTE — DISCHARGE PLANNING
Ongoing: This writer asked DPA to please resend referral as Rn reports  Cortrak and rectal tube are removed.

## 2022-11-19 NOTE — CARE PLAN
The patient is Stable - Low risk of patient condition declining or worsening    Shift Goals  Clinical Goals: decrease diarrhea  Patient Goals: Rest  Family Goals: corina    Progress made toward(s) clinical / shift goals:  No diarrhea    Patient is not progressing towards the following goals:      Problem: Knowledge Deficit - Standard  Goal: Patient and family/care givers will demonstrate understanding of plan of care, disease process/condition, diagnostic tests and medications  Outcome: Not Progressing

## 2022-11-20 PROBLEM — D72.829 LEUKOCYTOSIS: Status: ACTIVE | Noted: 2022-01-01

## 2022-11-20 NOTE — PROGRESS NOTES
Assumed care of patient. AO x3. With no complaints of pain . Routine assessment done. Vital signs within normal limits. Call light within reach and personal belongings within reach. Bed locked, bed alarm on and in lowest position. Policies and procedures reinforced patient verbalized understanding.Treaded socks in place. Will continue to monitor.

## 2022-11-20 NOTE — PROGRESS NOTES
The patient is Stable - Low risk of patient condition declining or worsening    Shift Goals  Clinical Goals: diarrhea control  Patient Goals: rest  Family Goals: corina    Progress made toward(s) clinical / shift goals:  No diarrhea    Patient is not progressing towards the following goals:progressing

## 2022-11-20 NOTE — PROGRESS NOTES
Hospital Medicine Daily Progress Note    Date of Service  11/20/2022    Chief Complaint  Found unresponsive    Hospital Course  Ashleigh Marquis is a 61 y.o. female with a history of breast cancer, chronic pain on oxycodone.  She was found unresponsive by a .  Paramedics arrived placed her on 15 L nonrebreather mask patient was tachycardic with heart rate in the 190s, she was hypothermic 93 °F.  Per report she was given Narcan and had some response.  Admitted 11/3/2022 with acute encephalopathy.  Labs here at the hospital showed acute kidney injury, hypokalemia, metabolic acidosis and concern of sepsis.  Patient was initially intubated placed on bicarb started on pressor support as well as broad-spectrum antibiotics of Zosyn and linezolid.  CT of the head showed by frontal subarachnoid hemorrhages.  MRA of the head and neck was negative for aneurysm.  The subarachnoid hemorrhage was felt due to be from trauma.  Patient was extubated 11/5 and weaned off of pressors.  Patient did grow out MSSA from her sputum antibiotics were adjusted to Unasyn.    Interval Problem Update  Patient diet advanced after fees to liquid diet.  Patient with persistent diarrhea we will discontinue the Imodium and start Lomotil.  Also add lactose-free to diet.  Core track will be discontinued.  11/16: Patient with persistent diarrhea.  Still no improvement with Lomotil.  She is also frequently refusing her medications.  11/17: Patient started on steroids yesterday for diarrhea she was shown improvement.  Anticipate she will be able to have her FMS removed in a couple days.  Replacing electrolytes.  11/18: Diarrhea still happening but slightly improved.  Potassium repleted now at 3.8.  Slight increase in the patient's creatinine to 2.4.  monitor off IV fluids, encourage PO fluids  11/19: Diarrhea resolved patient with occasional loose stools.  Patient is pending placement. Labs pending  11/20: No further episodes of diarrhea.   Patient is more awake today.  White count remains elevated from steroids.    I have discussed this patient's plan of care and discharge plan at IDT rounds today with Case Management, Nursing, Nursing leadership, and other members of the IDT team.    Consultants/Specialty  critical care    Code Status  DNAR/DNI    Disposition  Patient is not medically cleared for discharge.   Anticipate discharge to  be determined .  I have placed the appropriate orders for post-discharge needs.    Review of Systems  Review of Systems   Constitutional:  Negative for chills and fever.   Respiratory:  Negative for shortness of breath.    Cardiovascular:  Negative for chest pain.   Gastrointestinal:  Negative for abdominal pain and diarrhea.   All other systems reviewed and are negative.     Physical Exam  Temp:  [36.1 °C (97 °F)-36.8 °C (98.3 °F)] 36.8 °C (98.3 °F)  Pulse:  [] 96  Resp:  [16-19] 16  BP: (117-133)/(80-95) 117/82  SpO2:  [94 %-100 %] 98 %    Physical Exam  Vitals and nursing note reviewed.   Constitutional:       General: She is not in acute distress.     Appearance: She is ill-appearing.   HENT:      Head: Normocephalic and atraumatic.      Mouth/Throat:      Pharynx: No oropharyngeal exudate or posterior oropharyngeal erythema.   Eyes:      General: No scleral icterus.        Right eye: No discharge.         Left eye: No discharge.   Cardiovascular:      Rate and Rhythm: Normal rate and regular rhythm.      Heart sounds: Normal heart sounds. No murmur heard.    No friction rub. No gallop.   Pulmonary:      Effort: Pulmonary effort is normal. No respiratory distress.      Breath sounds: No stridor. Rhonchi present. No rales.   Chest:      Chest wall: Tenderness present.   Abdominal:      General: Bowel sounds are normal. There is no distension.      Palpations: Abdomen is soft. There is no mass.      Tenderness: There is no abdominal tenderness. There is no rebound.   Musculoskeletal:         General: Swelling  present. No tenderness.      Cervical back: Neck supple. No rigidity.   Skin:     General: Skin is warm and dry.      Coloration: Skin is not cyanotic or jaundiced.      Nails: There is no clubbing.   Neurological:      General: No focal deficit present.      Mental Status: She is alert.      Cranial Nerves: No cranial nerve deficit.      Motor: No weakness.      Comments: Oriented to self and place   Psychiatric:         Speech: Speech is delayed.         Cognition and Memory: She exhibits impaired recent memory.       Fluids    Intake/Output Summary (Last 24 hours) at 11/20/2022 1128  Last data filed at 11/20/2022 0800  Gross per 24 hour   Intake 480 ml   Output 200 ml   Net 280 ml         Laboratory  Recent Labs     11/18/22  1010 11/20/22  0925   WBC 18.6* 23.3*   RBC 3.43* 3.30*   HEMOGLOBIN 10.9* 10.5*   HEMATOCRIT 33.2* 31.6*   MCV 96.8 95.8   MCH 31.8 31.8   MCHC 32.8* 33.2*   RDW 51.7* 55.2*   PLATELETCT 291 334   MPV 11.5 11.6       Recent Labs     11/18/22  1010 11/20/22  0925   SODIUM 134* 135   POTASSIUM 3.8 4.3   CHLORIDE 105 106   CO2 14* 17*   GLUCOSE 202* 181*   BUN 52* 73*   CREATININE 2.40* 1.79*   CALCIUM 9.3 9.2                       Imaging  US-RENAL   Final Result      1.  Normal renal ultrasound.      DX-ABDOMEN FOR TUBE PLACEMENT   Final Result         Gastric drainage tube with tip projecting over the expected area of the first portion duodenum.      MR-BRAIN-WITH & W/O   Final Result      1.  Trace BILATERAL subarachnoid blood redemonstrated without appreciable change   2.  Possible RIGHT frontal lobe lesion with internal hemorrhage though I suspect this is imaging artifact related to motion. A metastatic lesion could have this appearance. Recommend repeating the contrast enhanced sequences when the patient may be    sedated or otherwise better able to remain still.   3.  Atrophy   4.  No evidence of acute ischemia, suggestion of other mass or other hemorrhage      EC-ECHOCARDIOGRAM  COMPLETE W/ CONT   Final Result      CT-HEAD W/O   Final Result      Decreased trace subarachnoid hemorrhage in the bilateral frontal vertices. No new acute intracranial hemorrhage.         DX-CHEST-PORTABLE (1 VIEW)   Final Result      1.  Bilateral perihilar interstitial and early alveolar opacities, favoring edema over pneumonia. Still, pneumonia can be considered in the appropriate clinical settings.   2.  No lobar consolidation. No large pleural effusions.      DX-ABDOMEN FOR TUBE PLACEMENT   Final Result      1.  Feeding tube tip overlies the distal gastric body.      DX-ABDOMEN FOR TUBE PLACEMENT   Final Result      1.  NG tube coiled in the fundus of the stomach.      DX-ABDOMEN FOR TUBE PLACEMENT   Final Result      Cortrak feeding tube coiled in fundus of stomach.      DX-ABDOMEN FOR TUBE PLACEMENT   Final Result      1.  Stable appearance of the enteric tube which is looped in the distal stomach and proximal duodenum with the tip directed cephalad at the GE junction.      DX-ABDOMEN FOR TUBE PLACEMENT   Final Result      Enteric feeding tube appears to be kinked in the expected location of the second portion of the duodenum with the tip terminating in the left upper quadrant, likely intragastric.      MR-MRA HEAD-W/O   Final Result         1.  Marked motion artifact but grossly normal MR angiogram of the [place] of [place].      MR-MRA NECK-W/O   Final Result      1.  Normal MR angiogram of the carotid arteries and vertebral basilar system..      DX-CHEST-PORTABLE (1 VIEW)   Final Result      1.  No acute cardiac or pulmonary abnormalities are identified.   2.  NG tube side-port projects at the gastroesophageal junction. Recommend advancing.      CT-HEAD W/O   Final Result      1.  BILATERAL frontal subarachnoid blood   2.  Atrophy      Based solely on CT findings, the brain injury guideline category is mBIG 2.      Nondisplaced skull fx   SDH 4.1-7.9mm   IPH 4.1-7.9mm   SAH 1 hemisphere, >3 sulci, 1-3mm       The original BIG retrospective analysis found radiographic progression in 0% of BIG 1 patients and 2.6% BIG 2.         Findings were communicated with and acknowledged by JEREMY M GONDA via [employer] on 11/4/2022 4:35 PM.      DX-ABDOMEN FOR TUBE PLACEMENT   Final Result      Enteric tube placement which appears intragastric.      DX-CHEST-PORTABLE (1 VIEW)   Final Result         1.  No acute cardiopulmonary disease.   2.  Trace left pleural effusion      DX-CHEST-PORTABLE (1 VIEW)   Final Result      1.  Tubes and lines in good position.      2.  11 mm nodule in left mid chest.      DX-CHEST-PORTABLE (1 VIEW)   Final Result      1.  No acute cardiopulmonary abnormality.   2.  15 mm left upper lung nodule which is nonspecific. Further evaluation with chest CT may be performed.   3.  No consolidation or pleural effusion.   4.  Nasogastric tube side port is at the GE junction. Recommend advancement.             Assessment/Plan  * Acute metabolic encephalopathy  Assessment & Plan  Possibly due to oxycodone overdose and SAH    EEG consistent with encephalopathy no seizures noted  Ammonia normal  MRI trace bilateral subarachnoid blood and possible right frontal lobe lesion per radiologist suspected to be artifact secondary to motion but cannot rule out metastatic lesion    Avoid sedating agents frequent orientation  Consider repeating MRI with contrast when more clinically stable    Leukocytosis  Assessment & Plan  Patient with elevated WBC likely from the steroids.  Procalcitonin is approximately normal.  For now continue to monitor off antibiotics.    Elevated troponin  Assessment & Plan  Likely demand ischemia  Echocardiogram with normal EF no regional wall motion abnormalities and hyperdynamic LV     Continue metoprolol       Seizure (HCC)  Assessment & Plan  Continue Keppra  EEGrevealed encephalopathy no active seizures  MRI?  Right frontal lobe lesion versus artifact  Repeat MRI with contrast when more clinically  stable    Metastatic breast cancer (HCC)  Assessment & Plan  Follow-up with oncology after discharge    Dysphagia  Assessment & Plan      SLP and aspiration precautions  Continue thiamine and monitor electrolytes  Advance diet per SLP    Primary hypertension  Assessment & Plan  Controlled on amlodipine continue to monitor    Hypernatremia  Assessment & Plan  Continue D5W  Encourage oral hydration    Diarrhea  Assessment & Plan  C diff was neg  abd exam benign  Imodium PRN  Reviewed prior records under different MRN patient hospitalized at [facility] evaluated by GI Dr. Pate with EGD and colonoscopy pathology with lymphocytic infiltration felt to be secondary to her breast cancer therapy    Continue Imodium we will add cholestyramine  11/17: Prednisone 40 BID until improving then taper after for 4-6 weeks    Subarachnoid hemorrhage (HCC)  Assessment & Plan  MRA head and neck was negative for aneurysm  Likely traumatic only trace blood noted on MRI        Aspiration pneumonia (HCC)  Assessment & Plan  Continue Unasyn will complete 5-day course on 11/16/2022  Monitor CBC and follow-up on urinalysis and repeat cultures    Hypokalemia  Assessment & Plan  Severe likely secondary to chronic GI losses    Replete and monitor    AYDEE (acute kidney injury) ([facility])  Assessment & Plan  Worsening serum creatinine suspect prerenal     Renal ultrasound ordered and reviewed with no hydronephrosis  Check urine sodium  Increase p.o. bicarb for metabolic acidosis  Encourage p.o. hydration  IV fluid bolus and continue D5W infusion discussed with nursing staff IV being replaced    Sepsis (HCC)  Assessment & Plan  Chest11/7 Wbc:18.4  Monitor vitals and labs  C/O aspiration pneumonia  11/4 Cdiff negative  11/3 Blood cultures no growth.  11/3 resp culture with MSSA  Previously completed antibiotic course for MSSA pneumonia monitor off antibiotics    On Unasyn for suspected aspiration pneumonia  Worsening leukocytosis however overall  clinically improved  Will continue Unasyn repeat blood cultures, check urinalysis  Monitor clinically and monitor CBC    Acute respiratory failure with hypoxia (HCC)  Assessment & Plan  Extubated 11/5  Previously completed antibiotic therapy for MSSA pneumonia  Given hypoxia and worsening leukocytosis started on Unasyn on 11/11/2022 for suspected aspiration pneumonia   Continue Unasyn and monitor clinically  Given worsening leukocytosis will check UA and blood cultures         VTE prophylaxis: SCDs/TEDs    I have performed a physical exam and reviewed and updated ROS and Plan today (11/20/2022). In review of yesterday's note (11/19/2022), there are no changes except as documented above.

## 2022-11-20 NOTE — PROGRESS NOTES
Assumed care of patient at 0700 from Lisa IRENE. Patient is AO4, states pain level of 0/10. Bed is locked in lowest position, 3 bed rails are up, seizure precautions are in place, call light and personal belongings are within reach. Bed alarm is on and nonskid socks are being worn. Hourly rounding is in place.

## 2022-11-20 NOTE — PROGRESS NOTES
"Patient has been spitting fluids she drinks into a vomit bag for most of the shift. When asked why she has been doing this she pointed at her RUQ and said, \"it gets stuck.\" She also complained of that area hurting, 9/10, and felt like it was burning.    Addendum:  Discussed with primary RN and decided to closely monitor patient's fluid intake to see if this alleviates her symptoms.   "

## 2022-11-20 NOTE — CARE PLAN
The patient is Stable - Low risk of patient condition declining or worsening    Shift Goals  Clinical Goals: diarrhea control  Patient Goals: rest  Family Goals: corina    Progress made toward(s) clinical / shift goals:      Patient is not progressing towards the following goals:

## 2022-11-21 PROBLEM — R74.01 TRANSAMINITIS: Status: ACTIVE | Noted: 2022-01-01

## 2022-11-21 PROBLEM — K92.1 MELENA: Status: ACTIVE | Noted: 2022-01-01

## 2022-11-21 NOTE — PROGRESS NOTES
Assumed care at 0700. Patient is A&O x 3. When asked if haivng pain patient points to LLQ but does not respond with numeric pain score or description of pain. Denies nausea. Continues drinking thickened liquids and spitting frequently. Patient requested bedpan, stool noted to be black and mucousy, provider notified, will send fecal occult blood. Patient continues to be tachycardic, 115, provider notified. Bed in lowest position with bed rails up. Call light within reach. Patient belongings near patient. Patient expresses no additional needs at this time. Hourly rounding in place.

## 2022-11-21 NOTE — ASSESSMENT & PLAN NOTE
EGD on 11/22/2022 with esophagitis gastric and duodenal ulcers with no active bleeding    Hemoglobin drop likely dilutional clinically patient is hemodynamically stable with no further signs of active bleed  Continue PPI and outpatient follow-up for repeat endoscopy

## 2022-11-21 NOTE — CARE PLAN
The patient is Stable - Low risk of patient condition declining or worsening    Shift Goals  Clinical Goals: GI management  Patient Goals: Rest  Family Goals: corina    Progress made toward(s) clinical / shift goals:    Problem: Knowledge Deficit - Standard  Goal: Patient and family/care givers will demonstrate understanding of plan of care, disease process/condition, diagnostic tests and medications  Outcome: Progressing     Problem: Pain - Standard  Goal: Alleviation of pain or a reduction in pain to the patient’s comfort goal  Outcome: Progressing     Problem: Skin Integrity  Goal: Skin integrity is maintained or improved  Outcome: Progressing     Problem: Fall Risk  Goal: Patient will remain free from falls  Outcome: Progressing       Patient is not progressing towards the following goals:Still vomitting

## 2022-11-21 NOTE — CARE PLAN
The patient is Stable - Low risk of patient condition declining or worsening    Shift Goals  Clinical Goals: Comfort and GI management  Patient Goals: Comfort  Family Goals: corina    Progress made toward(s) clinical / shift goals:    Problem: Pain - Standard  Goal: Alleviation of pain or a reduction in pain to the patient’s comfort goal  Outcome: Progressing     Problem: Skin Integrity  Goal: Skin integrity is maintained or improved  Outcome: Progressing     Problem: Fall Risk  Goal: Patient will remain free from falls  Outcome: Progressing       Patient is not progressing towards the following goals:

## 2022-11-21 NOTE — DIETARY
Nutrition Services: Update   Day 18 of admit.  Ashleigh Marquis is a 61 y.o. female with admitting DX of Narcotic overdose, accidental or unintentional, initial encounter.    Pt is currently on Level 3 - liquidized, Level 2 - mildly thick, lactose free diet. Providing [facility] with meals. Recorded PO intake varies, but remains usually <50%. Per notes, pt recently drinking thickened liquids but spitting them out. Pt with dark stool and GI consulted. EGD planned for tomorrow.     Wt 11/19: 60.5 kg via bed scale - Weight dropped 7.3 kg x 7 days. Recommend re-weigh to confirm weight.     Malnutrition Risk: If current weight correct, pt now meets ASPEN criteria for severe malnutrition related to poor PO intake as evidenced by 10.7% weight loss x 7 days and PO intake <50% for >1 week.    Recommendations/Plan:  Continue Boost VHC with all meals.   Encourage intake of meals and supplements >50%.  Document intake of all meals and supplements as % taken in ADL's to provide interdisciplinary communication across all shifts.   Monitor weight.  Nutrition rep will continue to see patient for ongoing meal and snack preferences.    Problem: Nutritional:  Goal: Achieve adequate nutritional intake  Description: Patient will consume >50% of meals  Outcome: Not met    RD following

## 2022-11-21 NOTE — PROGRESS NOTES
"    NOC Cross Cover Progress Note    Briefly, Ashleigh Marquis is a 61-year-old female with past medical history of breast cancer and chronic pain who was initially admitted to the ICU on 11/3/2022 for acute encephalopathy, frontal subarachnoid hemorrhages, and sepsis requiring intubation and mechanical ventilation.  She has since been extubated and transitioned to the medical floor where she is being treated for AYDEE, suspected aspiration pneumonia, acute encephalopathy, and diarrhea.    I was called to bedside this evening for report of tachycardia, intractable nonbilious nonbloody vomiting, and acute generalized abdominal pain.   She reports diffuse abdominal pain that started \"earlier today\" and is accompanied by nausea and vomiting.  She continues to have diarrhea and is passing gas.  She denies chest pain, melena, shortness of breath, dysuria, or fever/chills. She has had copious C. difficile negative diarrhea throughout her stay and is on Imodium and steroids. Benign abdominal exams per chart review; no abdominal imaging this admission.    Exam:  /84   Pulse (!) 124 Comment: rn notified  Temp 36.2 °C (97.1 °F) (Temporal)   Resp 16   Ht 1.676 m (5' 5.98\")   Wt 60.5 kg (133 lb 6.1 oz)   SpO2 96%   BMI 21.54 kg/m²     Gen: Awake, alert.  Moderate distress secondary to abdominal pain.  CVS: Rapid, regular.  Strong peripheral pulses.  Pulm: Respirations even and unlabored.  On supplemental oxygen with SPO2 greater than 90%.  Diminished bases.  Neuro: She follows commands and answers questions appropriately though is slow to respond.   GI/: Abdomen soft with active bowel sounds and flatus.  Diffusely tender, no rebound or guarding.     A/P:   #Abdominal pain  +N/V/D  CT abdomen pelvis without given AYDEE  Antiemetics  CBC, CMP, lactic acid    #Tachycardia  Likely hypovolemic given vomiting & diarrhea  Afebrile  EKG  500mL bolus and 500mL maintenance fluids     Linsey Wegener, APRN NOC " Hospitalist

## 2022-11-21 NOTE — PROGRESS NOTES
Hospital Medicine Daily Progress Note    Date of Service  11/21/2022    Chief Complaint  Found unresponsive    Hospital Course  Ashleigh Marquis is a 61 y.o. female with a history of breast cancer, chronic pain on oxycodone.  She was found unresponsive by a .  Paramedics arrived placed her on 15 L nonrebreather mask patient was tachycardic with heart rate in the 190s, she was hypothermic 93 °F.  Per report she was given Narcan and had some response.  Admitted 11/3/2022 with acute encephalopathy.  Labs here at the hospital showed acute kidney injury, hypokalemia, metabolic acidosis and concern of sepsis.  Patient was initially intubated placed on bicarb started on pressor support as well as broad-spectrum antibiotics of Zosyn and linezolid.  CT of the head showed by frontal subarachnoid hemorrhages.  MRA of the head and neck was negative for aneurysm.  The subarachnoid hemorrhage was felt due to be from trauma.  Patient was extubated 11/5 and weaned off of pressors.  Patient did grow out MSSA from her sputum antibiotics were adjusted to Unasyn.    Interval Problem Update    11/21: Nursing reports patient's been having dark stools.  Occult was positive.  GI consulted.  Also LFTs have been elevated and patient had been endorsing abdominal pain which started yesterday afternoon.  Night NP ordered a CT which was negative.  Right upper quadrant ultrasound does not show any gallbladder pathologies.  Tylenol discontinued and will trend CMP.    I have discussed this patient's plan of care and discharge plan at IDT rounds today with Case Management, Nursing, Nursing leadership, and other members of the IDT team.    Consultants/Specialty  critical care    Code Status  DNAR/DNI    Disposition  Patient is not medically cleared for discharge.   Anticipate discharge to  be determined .  I have placed the appropriate orders for post-discharge needs.    Review of Systems  Review of Systems   Constitutional:  Negative  for chills and fever.   Respiratory:  Negative for shortness of breath.    Cardiovascular:  Negative for chest pain.   Gastrointestinal:  Positive for abdominal pain and melena. Negative for diarrhea.   All other systems reviewed and are negative.     Physical Exam  Temp:  [36 °C (96.8 °F)-37 °C (98.6 °F)] 36 °C (96.8 °F)  Pulse:  [100-134] 115  Resp:  [16-19] 19  BP: (103-121)/(70-84) 115/72  SpO2:  [96 %-100 %] 100 %    Physical Exam  Vitals and nursing note reviewed.   Constitutional:       General: She is not in acute distress.     Appearance: She is ill-appearing.   HENT:      Head: Normocephalic and atraumatic.      Mouth/Throat:      Pharynx: No oropharyngeal exudate or posterior oropharyngeal erythema.   Eyes:      General: No scleral icterus.        Right eye: No discharge.         Left eye: No discharge.   Cardiovascular:      Rate and Rhythm: Normal rate and regular rhythm.      Heart sounds: Normal heart sounds. No murmur heard.    No friction rub. No gallop.   Pulmonary:      Effort: Pulmonary effort is normal. No respiratory distress.      Breath sounds: No stridor. Rhonchi present. No rales.   Chest:      Chest wall: Tenderness present.   Abdominal:      General: Bowel sounds are normal. There is no distension.      Palpations: Abdomen is soft. There is no mass.      Tenderness: There is no abdominal tenderness. There is no guarding or rebound.   Musculoskeletal:         General: Swelling present. No tenderness.      Cervical back: Neck supple. No rigidity.   Skin:     General: Skin is warm and dry.      Coloration: Skin is not cyanotic or jaundiced.      Nails: There is no clubbing.   Neurological:      General: No focal deficit present.      Mental Status: She is alert.      Cranial Nerves: No cranial nerve deficit.      Motor: No weakness.      Comments: Oriented to self and place   Psychiatric:         Speech: Speech is delayed.         Cognition and Memory: She exhibits impaired recent memory.        Fluids    Intake/Output Summary (Last 24 hours) at 11/21/2022 1107  Last data filed at 11/21/2022 0700  Gross per 24 hour   Intake 1512.29 ml   Output 375 ml   Net 1137.29 ml         Laboratory  Recent Labs     11/20/22  0925 11/20/22  1827   WBC 23.3* 22.7*   RBC 3.30* 2.92*   HEMOGLOBIN 10.5* 9.3*   HEMATOCRIT 31.6* 28.0*   MCV 95.8 95.9   MCH 31.8 31.8   MCHC 33.2* 33.2*   RDW 55.2* 56.8*   PLATELETCT 334 310   MPV 11.6 12.0       Recent Labs     11/20/22 0925 11/20/22  1827   SODIUM 135 138   POTASSIUM 4.3 4.0   CHLORIDE 106 105   CO2 17* 18*   GLUCOSE 181* 220*   BUN 73* 81*   CREATININE 1.79* 1.69*   CALCIUM 9.2 9.1                       Imaging  US-RUQ   Final Result      No acute sonographic abnormality. No gallstones.      CT-ABDOMEN-PELVIS W/O   Final Result      1.  No acute abnormality of the abdomen or pelvis within the limitations of this noncontrast exam. No evidence of nephroureterolithiasis or obstructive uropathy.   2.  16 mm nonspecific hypodensity in segment 4A of the liver. This finding is unlikely to be seen on ultrasound given its location, if indicated, further evaluation with multiphase CT or liver protocol MRI could be obtained.   3.  Atherosclerosis.      US-RENAL   Final Result      1.  Normal renal ultrasound.      DX-ABDOMEN FOR TUBE PLACEMENT   Final Result         Gastric drainage tube with tip projecting over the expected area of the first portion duodenum.      MR-BRAIN-WITH & W/O   Final Result      1.  Trace BILATERAL subarachnoid blood redemonstrated without appreciable change   2.  Possible RIGHT frontal lobe lesion with internal hemorrhage though I suspect this is imaging artifact related to motion. A metastatic lesion could have this appearance. Recommend repeating the contrast enhanced sequences when the patient may be    sedated or otherwise better able to remain still.   3.  Atrophy   4.  No evidence of acute ischemia, suggestion of other mass or other hemorrhage       EC-ECHOCARDIOGRAM COMPLETE W/ CONT   Final Result      CT-HEAD W/O   Final Result      Decreased trace subarachnoid hemorrhage in the bilateral frontal vertices. No new acute intracranial hemorrhage.         DX-CHEST-PORTABLE (1 VIEW)   Final Result      1.  Bilateral perihilar interstitial and early alveolar opacities, favoring edema over pneumonia. Still, pneumonia can be considered in the appropriate clinical settings.   2.  No lobar consolidation. No large pleural effusions.      DX-ABDOMEN FOR TUBE PLACEMENT   Final Result      1.  Feeding tube tip overlies the distal gastric body.      DX-ABDOMEN FOR TUBE PLACEMENT   Final Result      1.  NG tube coiled in the fundus of the stomach.      DX-ABDOMEN FOR TUBE PLACEMENT   Final Result      Cortrak feeding tube coiled in fundus of stomach.      DX-ABDOMEN FOR TUBE PLACEMENT   Final Result      1.  Stable appearance of the enteric tube which is looped in the distal stomach and proximal duodenum with the tip directed cephalad at the GE junction.      DX-ABDOMEN FOR TUBE PLACEMENT   Final Result      Enteric feeding tube appears to be kinked in the expected location of the second portion of the duodenum with the tip terminating in the left upper quadrant, likely intragastric.      MR-MRA HEAD-W/O   Final Result         1.  Marked motion artifact but grossly normal MR angiogram of the [place].      MR-MRA NECK-W/O   Final Result      1.  Normal MR angiogram of the carotid arteries and vertebral basilar system..      DX-CHEST-PORTABLE (1 VIEW)   Final Result      1.  No acute cardiac or pulmonary abnormalities are identified.   2.  NG tube side-port projects at the gastroesophageal junction. Recommend advancing.      CT-HEAD W/O   Final Result      1.  BILATERAL frontal subarachnoid blood   2.  Atrophy      Based solely on CT findings, the brain injury guideline category is mBIG 2.      Nondisplaced skull fx   SDH 4.1-7.9mm   IPH 4.1-7.9mm   SAH 1  hemisphere, >3 sulci, 1-3mm      The original BIG retrospective analysis found radiographic progression in 0% of BIG 1 patients and 2.6% BIG 2.         Findings were communicated with and acknowledged by JEREMY M GONDA via [employer] on 11/4/2022 4:35 PM.      DX-ABDOMEN FOR TUBE PLACEMENT   Final Result      Enteric tube placement which appears intragastric.      DX-CHEST-PORTABLE (1 VIEW)   Final Result         1.  No acute cardiopulmonary disease.   2.  Trace left pleural effusion      DX-CHEST-PORTABLE (1 VIEW)   Final Result      1.  Tubes and lines in good position.      2.  11 mm nodule in left mid chest.      DX-CHEST-PORTABLE (1 VIEW)   Final Result      1.  No acute cardiopulmonary abnormality.   2.  15 mm left upper lung nodule which is nonspecific. Further evaluation with chest CT may be performed.   3.  No consolidation or pleural effusion.   4.  Nasogastric tube side port is at the GE junction. Recommend advancement.             Assessment/Plan  * Acute metabolic encephalopathy  Assessment & Plan  Possibly due to oxycodone overdose and SAH    EEG consistent with encephalopathy no seizures noted  Ammonia normal  MRI trace bilateral subarachnoid blood and possible right frontal lobe lesion per radiologist suspected to be artifact secondary to motion but cannot rule out metastatic lesion    Avoid sedating agents frequent orientation  Consider repeating MRI with contrast when more clinically stable    Transaminitis  Assessment & Plan  Patient was endorsing right upper quadrant abdominal pain.  Right upper quadrant ultrasound and CT abdomen and pelvis did not show any acute pathologies.  LFTs slightly elevated.  Tylenol discontinued  Trend CMP    Melena  Assessment & Plan  Nursing endorsing blood stools.  Positive occult.  Slight decrease in patient's hemoglobin.  GI consulted  Trend hemoglobin  Hold Lovenox    Leukocytosis  Assessment & Plan  Patient with elevated WBC likely from the steroids.   Procalcitonin is approximately normal.  For now continue to monitor off antibiotics.    Elevated troponin  Assessment & Plan  Likely demand ischemia  Echocardiogram with normal EF no regional wall motion abnormalities and hyperdynamic LV     Continue metoprolol       Seizure (HCC)  Assessment & Plan  Continue Keppra  EEGrevealed encephalopathy no active seizures  MRI?  Right frontal lobe lesion versus artifact  Repeat MRI with contrast when more clinically stable    Metastatic breast cancer (HCC)  Assessment & Plan  Follow-up with oncology after discharge    Dysphagia  Assessment & Plan      SLP and aspiration precautions  Continue thiamine and monitor electrolytes  Advance diet per SLP    Primary hypertension  Assessment & Plan  Controlled on amlodipine continue to monitor    Hypernatremia  Assessment & Plan  Continue D5W  Encourage oral hydration    Diarrhea  Assessment & Plan  C diff was neg  abd exam benign  Imodium PRN  Reviewed prior records under different MRN patient hospitalized at [facility] evaluated by GI Dr. Pate with EGD and colonoscopy pathology with lymphocytic infiltration felt to be secondary to her breast cancer therapy    Continue Imodium we will add cholestyramine  11/17: Prednisone 40 BID until improving then taper after for 4-6 weeks    Subarachnoid hemorrhage (HCC)  Assessment & Plan  MRA head and neck was negative for aneurysm  Likely traumatic only trace blood noted on MRI        Aspiration pneumonia (HCC)  Assessment & Plan  Continue Unasyn will complete 5-day course on 11/16/2022  Monitor CBC and follow-up on urinalysis and repeat cultures    Hypokalemia  Assessment & Plan  Severe likely secondary to chronic GI losses    Replete and monitor    AYDEE (acute kidney injury) (HCC)  Assessment & Plan  Worsening serum creatinine suspect prerenal     Renal ultrasound ordered and reviewed with no hydronephrosis  Check urine sodium  Increase p.o. bicarb for metabolic acidosis  Encourage p.o.  hydration  IV fluid bolus and continue D5W infusion discussed with nursing staff IV being replaced    Sepsis (HCC)  Assessment & Plan  Chest11/7 Wbc:18.4  Monitor vitals and labs  C/O aspiration pneumonia  11/4 Cdiff negative  11/3 Blood cultures no growth.  11/3 resp culture with MSSA  Previously completed antibiotic course for MSSA pneumonia monitor off antibiotics    On Unasyn for suspected aspiration pneumonia  Worsening leukocytosis however overall clinically improved  Will continue Unasyn repeat blood cultures, check urinalysis  Monitor clinically and monitor CBC    Acute respiratory failure with hypoxia (HCC)  Assessment & Plan  Extubated 11/5  Previously completed antibiotic therapy for MSSA pneumonia  Given hypoxia and worsening leukocytosis started on Unasyn on 11/11/2022 for suspected aspiration pneumonia   Continue Unasyn and monitor clinically  Given worsening leukocytosis will check UA and blood cultures         VTE prophylaxis: SCDs/TEDs    I have performed a physical exam and reviewed and updated ROS and Plan today (11/21/2022). In review of yesterday's note (11/20/2022), there are no changes except as documented above.

## 2022-11-21 NOTE — CONSULTS
Date of Consultation:  11/21/2022    Patient: : Ashleigh Marquis  MRN: [id number]    Referring Physician:  Dr. Ochoa.      GI:Tita Miller M.D.     Reason for Consultation: r/o GI bleeding.     History of Present Illness:   61 y.o. female with a history of breast cancer, chronic pain on oxycodone.  She was found unresponsive by a .  Paramedics arrived placed her on 15 L nonrebreather mask patient was tachycardic with heart rate in the 190s, she was hypothermic 93 °F.  Per report she was given Narcan and had some response.  Admitted 11/3/2022 with acute encephalopathy.  Labs here at the hospital showed acute kidney injury, hypokalemia, metabolic acidosis and concern of sepsis.  Patient was initially intubated placed on bicarb started on pressor support as well as broad-spectrum antibiotics of Zosyn and linezolid.  CT of the head showed by frontal subarachnoid hemorrhages.  MRA of the head and neck was negative for aneurysm.  The subarachnoid hemorrhage was felt due to be from trauma.  Patient was extubated 11/5.     The patient started to have right side epigastric pain about 2 days ago.  Inpatient team also reports some dark stool and her hemoglobin dropped about 1.5 g in the last week.  GI team is consulted for upper GI bleeding and possible endoscopy intervention.    GI team saw the patient at bedside.  The patient is now fully oriented.  The patient continue to report some mild tenderness from the right upper abdomen.  Not related to food intake or fasting.  The patient cannot report exactly when was the last bowel movement but she reports dark stool.  Not much nausea vomiting at this time.  Exam of abdomen was grossly benign.  The patient never had upper GI scope before and that we could not find any colonoscopy in the past as well.  There is no evidence of overuse of NSAIDs.              No past medical history on file.      No past surgical history on file.    No family history on  file.    Social History     Socioeconomic History    Marital status:        ROS      Physical Exam:  Vitals:    11/20/22 1951 11/21/22 0030 11/21/22 0350 11/21/22 0815   BP: 121/79 110/70 103/71 115/72   Pulse: 100 (!) 113 (!) 134 (!) 115   Resp: 17 17 18 19   Temp: 36.3 °C (97.3 °F) 37 °C (98.6 °F) 36.2 °C (97.1 °F) 36 °C (96.8 °F)   TempSrc: Temporal Temporal Temporal Temporal   SpO2: 99% 97% 98% 100%   Weight:       Height:         Body mass index is 21.54 kg/m².    HEENT: grossly normal.  Cardiovascular: Normal heart rate.  Lungs: Respiratory effort is normal.   Abdomen: Soft, mild tenderness from the RUQ.   Skin: No erythema, No rash.  Lower limbs: normal, no pitting edema.   Neurologic: Alert & oriented x 1.   PSY: stable mood.         Labs:  Recent Labs     11/20/22 0925 11/20/22 1827 11/21/22  1055   WBC 23.3* 22.7* 24.7*   RBC 3.30* 2.92* 2.86*   HEMOGLOBIN 10.5* 9.3* 9.3*   HEMATOCRIT 31.6* 28.0* 27.5*   MCV 95.8 95.9 96.2   MCH 31.8 31.8 32.5   MCHC 33.2* 33.2* 33.8   RDW 55.2* 56.8* 58.1*   PLATELETCT 334 310 350   MPV 11.6 12.0 11.5     Recent Labs     11/20/22  0925 11/20/22 1827 11/21/22  1055   SODIUM 135 138 139   POTASSIUM 4.3 4.0 5.1   CHLORIDE 106 105 108   CO2 17* 18* 16*   GLUCOSE 181* 220* 177*   BUN 73* 81* 86*           Recent Labs     11/20/22 1827   ASTSGOT 69*   ALTSGPT 198*   TBILIRUBIN 0.2   ALKPHOSPHAT 294*   GLOBULIN 2.5             Imaging:  US-RUQ  Narrative: 11/21/2022 8:58 AM    HISTORY/REASON FOR EXAM:  Pain    TECHNIQUE/EXAM DESCRIPTION AND NUMBER OF VIEWS:  Real-time sonography of the liver and biliary tree.    COMPARISON: None    FINDINGS:  The liver measures 14.05 cm. The liver is normal in contour. There is no evidence of solid mass lesion.    The gallbladder is contracted. There is no evidence of cholelithiasis.  The gallbladder wall thickness measures 4.7 mm, related to gallbladder contraction. There is no pericholecystic fluid.  The common duct measures 4.00  mm.    The pancreas is not well seen due to bowel gas.  The visualized aorta is normal in caliber.    Intrahepatic IVC is patent.    The portal vein is patent with hepatopetal flow. The MPV measures 0.8 cm.    The right kidney measures 9.25 cm.    There is no ascites.  Impression: No acute sonographic abnormality. No gallstones.      US-RUQ   Final Result      No acute sonographic abnormality. No gallstones.      CT-ABDOMEN-PELVIS W/O   Final Result      1.  No acute abnormality of the abdomen or pelvis within the limitations of this noncontrast exam. No evidence of nephroureterolithiasis or obstructive uropathy.   2.  16 mm nonspecific hypodensity in segment 4A of the liver. This finding is unlikely to be seen on ultrasound given its location, if indicated, further evaluation with multiphase CT or liver protocol MRI could be obtained.   3.  Atherosclerosis.      US-RENAL   Final Result      1.  Normal renal ultrasound.      DX-ABDOMEN FOR TUBE PLACEMENT   Final Result         Gastric drainage tube with tip projecting over the expected area of the first portion duodenum.      MR-BRAIN-WITH & W/O   Final Result      1.  Trace BILATERAL subarachnoid blood redemonstrated without appreciable change   2.  Possible RIGHT frontal lobe lesion with internal hemorrhage though I suspect this is imaging artifact related to motion. A metastatic lesion could have this appearance. Recommend repeating the contrast enhanced sequences when the patient may be    sedated or otherwise better able to remain still.   3.  Atrophy   4.  No evidence of acute ischemia, suggestion of other mass or other hemorrhage      EC-ECHOCARDIOGRAM COMPLETE W/ CONT   Final Result      CT-HEAD W/O   Final Result      Decreased trace subarachnoid hemorrhage in the bilateral frontal vertices. No new acute intracranial hemorrhage.         DX-CHEST-PORTABLE (1 VIEW)   Final Result      1.  Bilateral perihilar interstitial and early alveolar opacities, favoring  edema over pneumonia. Still, pneumonia can be considered in the appropriate clinical settings.   2.  No lobar consolidation. No large pleural effusions.      DX-ABDOMEN FOR TUBE PLACEMENT   Final Result      1.  Feeding tube tip overlies the distal gastric body.      DX-ABDOMEN FOR TUBE PLACEMENT   Final Result      1.  NG tube coiled in the fundus of the stomach.      DX-ABDOMEN FOR TUBE PLACEMENT   Final Result      Cortrak feeding tube coiled in fundus of stomach.      DX-ABDOMEN FOR TUBE PLACEMENT   Final Result      1.  Stable appearance of the enteric tube which is looped in the distal stomach and proximal duodenum with the tip directed cephalad at the GE junction.      DX-ABDOMEN FOR TUBE PLACEMENT   Final Result      Enteric feeding tube appears to be kinked in the expected location of the second portion of the duodenum with the tip terminating in the left upper quadrant, likely intragastric.      MR-MRA HEAD-W/O   Final Result         1.  Marked motion artifact but grossly normal MR angiogram of the [place].      MR-MRA NECK-W/O   Final Result      1.  Normal MR angiogram of the carotid arteries and vertebral basilar system..      DX-CHEST-PORTABLE (1 VIEW)   Final Result      1.  No acute cardiac or pulmonary abnormalities are identified.   2.  NG tube side-port projects at the gastroesophageal junction. Recommend advancing.      CT-HEAD W/O   Final Result      1.  BILATERAL frontal subarachnoid blood   2.  Atrophy      Based solely on CT findings, the brain injury guideline category is mBIG 2.      Nondisplaced skull fx   SDH 4.1-7.9mm   IPH 4.1-7.9mm   SAH 1 hemisphere, >3 sulci, 1-3mm      The original BIG retrospective analysis found radiographic progression in 0% of BIG 1 patients and 2.6% BIG 2.         Findings were communicated with and acknowledged by JEREMY M GONDA via [employer] on 11/4/2022 4:35 PM.      DX-ABDOMEN FOR TUBE PLACEMENT   Final Result      Enteric tube placement which  appears intragastric.      DX-CHEST-PORTABLE (1 VIEW)   Final Result         1.  No acute cardiopulmonary disease.   2.  Trace left pleural effusion      DX-CHEST-PORTABLE (1 VIEW)   Final Result      1.  Tubes and lines in good position.      2.  11 mm nodule in left mid chest.      DX-CHEST-PORTABLE (1 VIEW)   Final Result      1.  No acute cardiopulmonary abnormality.   2.  15 mm left upper lung nodule which is nonspecific. Further evaluation with chest CT may be performed.   3.  No consolidation or pleural effusion.   4.  Nasogastric tube side port is at the GE junction. Recommend advancement.                              Results for orders placed during the hospital encounter of 11/03/22    CT-ABDOMEN-PELVIS W/O    Impression  1.  No acute abnormality of the abdomen or pelvis within the limitations of this noncontrast exam. No evidence of nephroureterolithiasis or obstructive uropathy.  2.  16 mm nonspecific hypodensity in segment 4A of the liver. This finding is unlikely to be seen on ultrasound given its location, if indicated, further evaluation with multiphase CT or liver protocol MRI could be obtained.  3.  Atherosclerosis.                                             Results for orders placed during the hospital encounter of 11/03/22    US-RUQ    Impression  No acute sonographic abnormality. No gallstones.                              Impressions:  61F with no major GI hx admitted for encephalopathy, CT found SAH, GI is consulted for slowly dropping Hgb, dark stool and abdominal pain.     She never had EGD or colonoscopy before per the record and also info from her daughter.     Combining her dark stool, decreasing Hgb, abd pain, GI team will offer upper Gi scope on 11/22. Her daughter (phone in the epic) is aware and agree with the plan.     Other recs:  - Keep oral/iv PPI 40mg bid.  - If brisk bleeding happens, please arrange CTA and call us back.   - Avoid/taper NSAID.   - NPO midnight.     This note  might be generated using voice recognition software which has a small chance of producing errors of grammar and possibly content. I have made every reasonable attempt to find and correct any obvious errors, but expect that some may not be found prior to finalization of this note.

## 2022-11-21 NOTE — PROGRESS NOTES
Assumed care of patient. AO x3. With complaints of pain of 3 0n a scale (0-10) in the abdomen. Routine assessment done. Vital signs within normal limits. Call light within reach and personal belongings within reach. Bed locked and in lowest position. Policies and procedures reinforced patient verbalized understanding.Treaded socks in place. Will continue to monitor.

## 2022-11-21 NOTE — ASSESSMENT & PLAN NOTE
Patient was endorsing right upper quadrant abdominal pain.  Right upper quadrant ultrasound and CT abdomen and pelvis did not show any acute pathologies.  LFTs slightly elevated.  Tylenol discontinued  Monitor LFTs    Will need CT with contrast or MRI with contrast as outpatient given possible liver lesion

## 2022-11-22 PROBLEM — K92.2 ACUTE GASTROINTESTINAL HEMORRHAGE: Status: ACTIVE | Noted: 2022-01-01

## 2022-11-22 PROBLEM — K92.2 ACUTE GASTROINTESTINAL HEMORRHAGE: Status: RESOLVED | Noted: 2022-01-01 | Resolved: 2022-01-01

## 2022-11-22 PROBLEM — E87.0 HYPERNATREMIA: Status: RESOLVED | Noted: 2022-01-01 | Resolved: 2022-01-01

## 2022-11-22 PROBLEM — A41.9 SEPSIS (HCC): Status: RESOLVED | Noted: 2022-01-01 | Resolved: 2022-01-01

## 2022-11-22 PROBLEM — J69.0 ASPIRATION PNEUMONIA (HCC): Status: RESOLVED | Noted: 2022-01-01 | Resolved: 2022-01-01

## 2022-11-22 PROBLEM — D62 ANEMIA ASSOCIATED WITH ACUTE BLOOD LOSS: Status: ACTIVE | Noted: 2022-01-01

## 2022-11-22 PROBLEM — E87.6 HYPOKALEMIA: Status: RESOLVED | Noted: 2022-01-01 | Resolved: 2022-01-01

## 2022-11-22 NOTE — PROCEDURES
OPERATIVE REPORT    PATIENT:   Ashleigh Marquis   1960       PREOPERATIVE DIAGNOSES/INDICATIONS: abd pain, tarry stool.     POSTOPERATIVE DIAGNOSIS: Esophageal ulcer, GERD, gastric ulcer, large hernia, duodenal ulcers.     PROCEDURE:  ESOPHAGOGASTRODUODENOSCOPY    PHYSICIAN:  Tita Miller MD. MPH.    ANESTHESIA:  Per anesthesiologist.    LOCATION: [facility]    CONSENT:  OBTAINED. The risks, benefits and alternatives of the procedure were discussed in details. The risks include and are not limited to bleeding, infection, perforation, missed lesions, and sedations risks (cardiopulmonary compromise and allergic reaction to medications).    DESCRIPTION: The patient presented to the procedure room.  After routine checkup was performed, patient was brought into the endoscopy suite.  Patient was placed on his left lateral decubitus position. A bite block was placed in patient's mouth. Patient was sedated by anesthesia.  Vital signs were monitored throughout the procedure.  Oxygenation support was provided throughout the procedure. Upper endoscope was inserted into patient's mouth and advanced to the second portion of the duodenum under direct visualization.      Once the site was reached and examined, the upper endoscope was withdrawn.  Retroflexion was made within the stomach.  The stomach was decompressed, scope was withdrawn and the procedure was terminated.    The patient tolerated the procedure well.  There were no immediate complications.    OPERATIVE FINDINGS:    1. Esophagus: Z line at 34 cm. Sloughing esophagitis and ulcer formation for 10+cm at the distal esophagus.   2. Stomach: Multiple small ulcers. Biopsied for HP.   3. Duodenum: Multiple moderate to large ulcers 1-3cm from bulb to proximal 3rd portion.     All ulcers were checked, no bleeding, no high risk stigmata to treat.     The patient could have peptic (acid-related) gut injury, or NSAID or ischemic injury.     Recs:  Keep ppi 40mg bid oral for  8 weeks. Need outpatient GI ([facility] or [facility]) to repeat EGD in Jan 2023.   Okay to resume diet.   Inpatient GI will follow up the pathology.     GI signs off.         This note has been transcribed with digital voice recognition software and although it has been reviewed may contain grammatical or word errors

## 2022-11-22 NOTE — ANESTHESIA PREPROCEDURE EVALUATION
Case: [id number] Date/Time: 11/22/22 1015    Procedure: GASTROSCOPY (Esophagus)    Anesthesia type: MAC    Pre-op diagnosis: GI Bleed, abdominal pain    Location: [facility] ROOM 26 / SURGERY SAME DAY [facility]    Surgeons: Tita Miller M.D.          Relevant Problems   PULMONARY   (positive) Aspiration pneumonia (HCC)      NEURO   (positive) Seizure (HCC)      CARDIAC   (positive) Primary hypertension         (positive) AYDEE (acute kidney injury) (HCC)      Other   (positive) Acute gastrointestinal hemorrhage   (positive) Melena       Physical Exam    Airway   Mallampati: III  TM distance: >3 FB  Neck ROM: full       Cardiovascular - normal exam  Rhythm: regular  Rate: normal  (-) murmur     Dental         Very poor dentition   Pulmonary - normal exam  Breath sounds clear to auscultation     Abdominal    Neurological - normal exam                 Anesthesia Plan    ASA 3- EMERGENT   ASA physical status 3 criteria: alcohol and/or substance dependence or abuseASA physical status emergent criteria: acute hemorrhage    Plan - MAC               Induction: intravenous    Postoperative Plan: Postoperative administration of opioids is intended.    Pertinent diagnostic labs and testing reviewed    Informed Consent:    Anesthetic plan and risks discussed with patient.    Use of blood products discussed with: patient whom consented to blood products.

## 2022-11-22 NOTE — DISCHARGE PLANNING
Case Management Discharge Planning    Admission Date: 11/3/2022  GMLOS: 5  ALOS: 19    6-Clicks ADL Score: 12  6-Clicks Mobility Score: 13  PT and/or OT Eval ordered: Yes  Post-acute Referrals Ordered: Yes  Post-acute Choice Obtained: Yes  Has referral(s) been sent to post-acute provider:  Yes      Anticipated Discharge Dispo: Discharge Disposition: D/T to SNF under Medicaid but not cert under Medicare w/planned hosp IP readmit(92)    DME Needed: No    Action(s) Taken: Updated Provider/Nurse on Discharge Plan    Escalations Completed: None    Medically Clear: No    Next Steps: Patient discussed during morning IDT rounds with team. Patient is not medically cleared for discharge at this time. SNF pending, DPA followed up on referrals sent. CM to continue to follow for dc planning.     Barriers to Discharge: Medical clearance    Is the patient up for discharge tomorrow: No

## 2022-11-22 NOTE — ANESTHESIA POSTPROCEDURE EVALUATION
Patient: Ashleigh Marquis    Procedure Summary     Date: 11/22/22 Room / Location: [facility] ROOM 26 / SURGERY SAME DAY [facility]    Anesthesia Start: 1026 Anesthesia Stop: 1045    Procedures:       GASTROSCOPY (Esophagus)      GASTROSCOPY, WITH BIOPSY (Esophagus) Diagnosis: (duodenal, gastric and esophageal ulcers)    Surgeons: Tita Miller M.D. Responsible Provider: Zachary Avilez M.D.    Anesthesia Type: MAC ASA Status: 3 - Emergent          Final Anesthesia Type: MAC  Last vitals  BP   Blood Pressure: 138/77    Temp   36.5 °C (97.7 °F)    Pulse   (!) 106   Resp   18    SpO2   96 %      Anesthesia Post Evaluation    Patient location during evaluation: PACU  Patient participation: complete - patient participated  Level of consciousness: awake and alert  Pain score: 0    Airway patency: patent  Anesthetic complications: no  Cardiovascular status: hemodynamically stable  Respiratory status: acceptable  Hydration status: euvolemic    PONV: none          No notable events documented.     Nurse Pain Score: 0 (NPRS)

## 2022-11-22 NOTE — PROGRESS NOTES
Unable to draw labs out of R chest port. Phlebotomist paged to draw Q8 H/H and AM labs. Will notify on-call hospitalist.    On-call hospitalist ordered ATP 01:08.     ATP administered at 02:07. No blood return noted at 02:37. Will retry at 04:07. Blood return noted at 04:07. Chest port heparin locked.    03:00 Two phlebotomist have tried to draw labs on patient unsuccessfully. Third phlebotomist successfully sherlyn labs at 03:40.

## 2022-11-22 NOTE — CARE PLAN
The patient is Stable - Low risk of patient condition declining or worsening    Shift Goals  Clinical Goals: safety, ambulate  Patient Goals: water  Family Goals: N/A    Progress made toward(s) clinical / shift goals:      Problem: Knowledge Deficit - Standard  Goal: Patient and family/care givers will demonstrate understanding of plan of care, disease process/condition, diagnostic tests and medications  Outcome: Progressing     Problem: Pain - Standard  Goal: Alleviation of pain or a reduction in pain to the patient’s comfort goal  Outcome: Progressing     Problem: Skin Integrity  Goal: Skin integrity is maintained or improved  Outcome: Progressing     Problem: Fall Risk  Goal: Patient will remain free from falls  Outcome: Progressing       Patient is not progressing towards the following goals:

## 2022-11-22 NOTE — OR NURSING
1043 Pt to PACU from OR. Report from anesthesia and OR RN. On 2L O2 via NC. Respirations even and unlabored. VSS.     1057 Patient's daughter, Katalina, updated via phone call.    1114 Report called to bedside RNMallory. All questions and concerns addressed at this time.    1138 Patient transferred back to room S530-2 via [place] by [facility].

## 2022-11-22 NOTE — THERAPY
Patient Name: Ashleigh Marquis  Age:  62 y.o., Sex:  female  Medical Record #: [id number]  Today's Date: 11/22/2022    Attempted to see pt for OT tx. Per chart pt off the floor for procedure. Will try again later as able.

## 2022-11-22 NOTE — CARE PLAN
The patient is Watcher - Medium risk of patient condition declining or worsening    Shift Goals  Clinical Goals: Q8H/H, NPO@MN  Patient Goals: Rest  Family Goals: N/A      Problem: Knowledge Deficit - Standard  Goal: Patient and family/care givers will demonstrate understanding of plan of care, disease process/condition, diagnostic tests and medications  Outcome: Progressing     Problem: Pain - Standard  Goal: Alleviation of pain or a reduction in pain to the patient’s comfort goal  Outcome: Progressing     Problem: Skin Integrity  Goal: Skin integrity is maintained or improved  Outcome: Progressing     Problem: Fall Risk  Goal: Patient will remain free from falls  Outcome: Progressing       Progress made toward(s) clinical / shift goals:  Patient updated on the plan of care. All questions answered. Fall precautions in place. Skin and pain assessed. Wound care orders followed. Medicated per MAR. Hourly rounding provided.

## 2022-11-22 NOTE — THERAPY
Missed Therapy     Patient Name: Ashleigh Marquis  Age:  62 y.o., Sex:  female  Medical Record #: [id number]  Today's Date: 11/22/2022    Attempted to see pt for SLP tx; however, pt is in an EGD. Will hold and see as medically appropriate.       11/22/22 1148   Treatment Variance   Reason For Missed Therapy Medical - Patient  in Procedure

## 2022-11-22 NOTE — PROGRESS NOTES
Patient remains tachycardic, 120s. Provider was notified, EKG and 500 ml LR bolus ordered. EKG showed sinus tach. Patient still tachy after bolus, /67, metoprolol dose held per provider with BP and hgb down trending (9.3 at 11am to 8.4 at 6pm). Patient noted to have one large black tarry stool incontinence this am and one small black stool on the bedpan. Fecal occult blood positive this morning. Patient to be NPO at midnight for EGD in the AM.

## 2022-11-22 NOTE — THERAPY
Physical Therapy   Daily Treatment     Patient Name: Ashleigh Marquis  Age:  61 y.o., Sex:  female  Medical Record #: [id number]  Today's Date: 11/21/2022     Precautions  Precautions: Fall Risk;Swallow Precautions ( See Comments)    Assessment    Pt agreeable to therapy, however with poor attention. Perseverating on drinking milk, however would hold milk in oral cavity and then immediately suction it out. Notified RN of behaviors. Pt let therapist place drink to side and mobilize. Improved standing ability, able to take marching and lateral steps. Discussed goal to ambulate to bathroom, pt agreeable, however too distracted to continue this session. Continue to recommend placement.    Plan    Treatment plan modified to 3 times per week until therapy goals are met for the following treatments:  Bed Mobility, Gait Training, Neuro Re-Education / Balance, Therapeutic Activities, and Therapeutic Exercises.    DC Equipment Recommendations: Unable to determine at this time  Discharge Recommendations: Recommend post-acute placement for additional physical therapy services prior to discharge home           Objective       11/21/22 1201   Cognition    Level of Consciousness Alert   Safety Awareness Impaired   New Learning Impaired   Attention Impaired   Comments very confused, follows simple commands   Balance   Sitting Balance (Static) Fair +   Sitting Balance (Dynamic) Fair   Standing Balance (Static) Fair   Standing Balance (Dynamic) Fair   Weight Shift Sitting Fair   Weight Shift Standing Fair   Skilled Intervention Verbal Cuing;Tactile Cuing;Sequencing   Comments HHA   Gait Analysis   Gait Level Of Assist Minimal Assist   Assistive Device Hand Held Assist   Distance (Feet) 4  (marching)   # of Times Distance was Traveled 1   Weight Bearing Status no restrictions   Skilled Intervention Verbal Cuing;Tactile Cuing;Sequencing   Bed Mobility    Supine to Sit Minimal Assist   Sit to Supine Minimal Assist   Scooting Minimal  Assist   Skilled Intervention Verbal Cuing;Tactile Cuing;Sequencing   Functional Mobility   Sit to Stand Minimal Assist   Skilled Intervention Tactile Cuing;Verbal Cuing;Sequencing   How much difficulty does the patient currently have...   Turning over in bed (including adjusting bedclothes, sheets and blankets)? 3   Sitting down on and standing up from a chair with arms (e.g., wheelchair, bedside commode, etc.) 1   Moving from lying on back to sitting on the side of the bed? 1   How much help from another person does the patient currently need...   Moving to and from a bed to a chair (including a wheelchair)? 3   Need to walk in a hospital room? 3   Climbing 3-5 steps with a railing? 2   6 clicks Mobility Score 13   Short Term Goals    Short Term Goal # 1 Pt will perform supine <> sit with SPV in 6 visits to return to PLOF   Goal Outcome # 1 goal not met   Short Term Goal # 2 Pt will perform STS tranfsers with FWW and SPV in 6 visits to improve functional independence   Goal Outcome # 2 Goal not met   Short Term Goal # 3 Pt will ambulate 50ft with FWW and CGA in 6 visits to initiate gait training   Goal Outcome # 3 Goal not met

## 2022-11-23 PROBLEM — J96.01 ACUTE RESPIRATORY FAILURE WITH HYPOXIA (HCC): Status: RESOLVED | Noted: 2022-01-01 | Resolved: 2022-01-01

## 2022-11-23 NOTE — DIETARY
Nutrition Services Brief Update:    Day 20 of admit.  Ashleigh Marquis is a 62 y.o. female with admitting DX of Narcotic overdose, accidental or unintentional, initial encounter, Acute gastrointestinal hemorrhage.    Diet advanced this morning to Level 4 - pureed, Level 2 - mildly thick liquids. Providing Boost VHC with all meals. Pt was NPO yesterday for EGD and prior to that was on a Liquidized diet with mildly thick liquids. Recorded PO intake of meals was <50%, but 2 Boost VHC were recorded 11/21 at %. Will continue to offer supplements with all meals. Monitor adequacy of PO intake on upgraded diet.    Problem: Nutritional:  Goal: Achieve adequate nutritional intake  Description: Patient will consume >50% of meals  Outcome: Progressing.    RD following

## 2022-11-23 NOTE — THERAPY
Occupational Therapy  Daily Treatment     Patient Name: Ashleigh Marquis  Age:  62 y.o., Sex:  female  Medical Record #: [id number]  Today's Date: 11/23/2022       Precautions: Fall Risk, Swallow Precautions ( See Comments)    Assessment    Pt seen for OT tx. Continues to be limited by decreased activity tolerance, balance deficits, inattention and poor safety awareness impacting ability to complete ADLs and ADL transfers independently. Amb w/ HHA in room and min A for balance and safety. Mod A sit > stand from toilet d/t low surface height. Will continue to follow while in house.     Plan    Continue current treatment plan.    DC Equipment Recommendations: Unable to determine at this time  Discharge Recommendations: Recommend post-acute placement for additional occupational therapy services prior to discharge home       11/23/22 1231   Cognition    Cognition / Consciousness X   Safety Awareness Impaired   New Learning Impaired   Attention Impaired   Active ROM Upper Body   Active ROM Upper Body  X   Comments improving ROM and able to assist w/ ADLs but limited w/ repeated use   Strength Upper Body   Upper Body Strength  X   Gross Strength Generalized Weakness, Equal Bilaterally.    Balance   Sitting Balance (Static) Fair +   Sitting Balance (Dynamic) Fair   Standing Balance (Static) Fair -   Standing Balance (Dynamic) Fair -   Weight Shift Sitting Fair   Weight Shift Standing Fair   Bed Mobility    Supine to Sit Minimal Assist   Sit to Supine Minimal Assist   Activities of Daily Living   Grooming Minimal Assist;Standing   Upper Body Dressing Minimal Assist   Lower Body Dressing Maximal Assist   Toileting Maximal Assist   Functional Mobility   Sit to Stand Minimal Assist   Bed, Chair, Wheelchair Transfer Minimal Assist   Toilet Transfers Moderate Assist   Short Term Goals   Short Term Goal # 1 pt will demo toilet txf with supv   Goal Outcome # 1 Goal not met   Short Term Goal # 2 pt will groom in stance at the sink  with supv   Goal Outcome # 2 Goal not met   Short Term Goal # 3 pt will dress LB with supv   Goal Outcome # 3 Goal not met   Short Term Goal # 4 pt will follow 2 step direction 75% of the time   Goal Outcome # 4 Progressing as expected   Anticipated Discharge Equipment and Recommendations   DC Equipment Recommendations Unable to determine at this time   Discharge Recommendations Recommend post-acute placement for additional occupational therapy services prior to discharge home

## 2022-11-23 NOTE — DISCHARGE PLANNING
DC Transport Scheduled    Received request at: 11/23/2022 [facility]    Transport Company Scheduled:  [employer]  Spoke with Radha at [facility] to schedule transport.  [facility] Trip #: [id number] (delete if not [facility])    Scheduled Date: 11/23/2022  Scheduled Time: [id number]    Destination: [address], [place]    Notified care team of scheduled transport via Vo[employer].     If there are any changes needed to the DC transportation scheduled, please contact [facility] at ext. [phone] between the hours of [phone] Mon-Fri. If outside those hours, contact the ED Case Manager at ext. [phone].

## 2022-11-23 NOTE — DISCHARGE PLANNING
Case Management Discharge Planning    Admission Date: 11/3/2022  GMLOS: 5  ALOS: 20    6-Clicks ADL Score: 12  6-Clicks Mobility Score: 13  PT and/or OT Eval ordered: Yes  Post-acute Referrals Ordered: Yes  Post-acute Choice Obtained: Yes  Has referral(s) been sent to post-acute provider:  Yes      Anticipated Discharge Dispo: Discharge Disposition: D/T to SNF under Medicaid but not cert under Medicare w/planned hosp IP readmit(92)    DME Needed: No    Action(s) Taken: Acceptance Received, Transport Arranged , and Completed PASSR/LOC    Escalations Completed: None    Medically Clear: Yes    Next Steps: Patient discussed during morning IDT rounds with team. Patient is medically cleared for discharge to SNF, [facility] accepted pt, PASRR and LOC completed, Transportation has been set up by [facility] for 1430p today, covid swab completed by RN, no other needs at this time, facility made aware of discharge time.     Barriers to Discharge: None    Is the patient up for discharge tomorrow: No, today.

## 2022-11-23 NOTE — ASSESSMENT & PLAN NOTE
Secondary to upper GI bleed    Monitor hemoglobin and transfuse if less than 7  Bleeding clinically resolved EGD with multiple ulcers but no active bleeding

## 2022-11-23 NOTE — THERAPY
Speech Language Pathology  Daily Treatment     Patient Name: Ashleigh Marquis  Age:  62 y.o., Sex:  female  Medical Record #: [id number]  Today's Date: 11/23/2022     Precautions  Precautions: Fall Risk, Swallow Precautions ( See Comments)  Comments: MAP> 65 and SBP<140    Assessment    Patient seen for dysphagia tx on this date. Upon entry, pt with a bottle of water at bedside with 50% of liquid consumed. Pt educated re: silent aspiration w/thin liquids on FEES completed 11/14/22, RN notified. Pt did not recall previous SLP visits and FEES. Pt agreeable to PO of mildly thick liquids and PO trials of pureed solids and thin liquids. Adequate bolus acceptance, containment and transit. One swallow per bolus appreciated. Immediate cough response with thin liquids via tsp in 2/3 trials and via cup in 2/2 trials, concerning for airway invasion.       Recommendations:  Pureed solids and mildly thick liquids  2.  Swallowing Instructions & Precautions:   Supervision: 1:1 feeding with constant supervision, Assist with meal tray set up, Direct supervision during meals, Encourage self-feeding  Positioning: Fully upright and midline during oral intake  Medication: Whole with puree  Strategies: Small bites/sips, Alternate bites and sips, Slow rate of intake, No straws, Multiple swallows (x 2) per bite/sip   Oral Care: Q4h  3.  SLP to follow for cognitive and swallowing therapy.      Plan    Continue current treatment plan.    Discharge Recommendations: Recommend post-acute placement for additional speech therapy services prior to discharge home    Objective       11/23/22 0902   Dysphagia    Positioning / Behavior Modification Self Monitoring;Modulate Rate or Bite Size;Multiple Swallows;Cough / Clear after Swallow   Other Treatments tx with PO of MT and PO trials of TN, PU   Diet / Liquid Recommendation Puree (4);Mildly Thick (2) - (Nectar Thick)   Nutritional Liquid Intake Rating Scale Thickened beverages (mildly thick unless  "otherwise specified)   Nutritional Food Intake Rating Scale Total oral diet of a single consistency   Nursing Communication Swallow Precaution Sign Posted at Head of Bed   Recommended Route of Medication Administration   Medication Administration  Float Whole with Puree   Patient / Family Goals   Patient / Family Goal #1 \"That's good!\"   Goal #1 Outcome Progressing slower than expected   Short Term Goals   Short Term Goal # 1 New 11/23: Pt will consume a diet of PU4/MT2 with no overt s/sx of aspiration with monitoring during meals   Short Term Goal # 1 B  11/14/22: Pt will consume liquiduidised diet given 1:1 supervision for PO intake without any overt s/sx of aspiration   Goal Outcome  # 1 B Goal met     "

## 2022-11-23 NOTE — THERAPY
Physical Therapy   Daily Treatment     Patient Name: Ashleigh Marquis  Age:  62 y.o., Sex:  female  Medical Record #: [id number]  Today's Date: 11/23/2022     Precautions  Precautions: Fall Risk;Swallow Precautions ( See Comments)    Assessment    Pt greeted and agreed to therapy. Pt req'd Yoni for all mobility and was able to walk to bathroom w/ HHA. Pt req'd increased assist off of toilet due to low toilet. Pt presented with ataxic gait. Pt is currently limited by decreased balance and poor motor planning, pt will continue to benefit from skilled PT to address deficits.     Plan    Continue current treatment plan.    DC Equipment Recommendations: Unable to determine at this time  Discharge Recommendations: Recommend post-acute placement for additional physical therapy services prior to discharge home       11/23/22 1213   Balance   Sitting Balance (Static) Fair +   Sitting Balance (Dynamic) Fair   Standing Balance (Static) Fair -   Standing Balance (Dynamic) Fair -   Weight Shift Sitting Fair   Weight Shift Standing Fair   Skilled Intervention Verbal Cuing;Sequencing;Tactile Cuing   Comments HHA   Gait Analysis   Gait Level Of Assist Minimal Assist   Assistive Device Hand Held Assist   Distance (Feet) 6   # of Times Distance was Traveled 2   Deviation Ataxic   Skilled Intervention Verbal Cuing;Sequencing   Comments pt able to walk to use bathroom, Yoni for balance   Bed Mobility    Supine to Sit Minimal Assist   Sit to Supine Minimal Assist   Scooting Minimal Assist   Skilled Intervention Verbal Cuing;Sequencing   Comments impulsive at times   Functional Mobility   Sit to Stand Minimal Assist   Bed, Chair, Wheelchair Transfer Minimal Assist   Toilet Transfers Moderate Assist   Skilled Intervention Verbal Cuing;Sequencing   Comments cues for hand placement for toilet transfer, pt req'd increased assist due to low toilet   Short Term Goals    Short Term Goal # 1 Pt will perform supine <> sit with SPV in 6 visits to  return to PLOF   Goal Outcome # 1 goal not met   Short Term Goal # 2 Pt will perform STS tranfsers with FWW and SPV in 6 visits to improve functional independence   Goal Outcome # 2 Goal not met   Short Term Goal # 3 Pt will ambulate 50ft with FWW and CGA in 6 visits to initiate gait training   Goal Outcome # 3 Goal not met

## 2022-11-23 NOTE — CARE PLAN
The patient is Stable - Low risk of patient condition declining or worsening    Shift Goals  Clinical Goals: Rest  Patient Goals: Rest  Family Goals: N/A      Problem: Knowledge Deficit - Standard  Goal: Patient and family/care givers will demonstrate understanding of plan of care, disease process/condition, diagnostic tests and medications  Outcome: Progressing     Problem: Pain - Standard  Goal: Alleviation of pain or a reduction in pain to the patient’s comfort goal  Outcome: Progressing     Problem: Skin Integrity  Goal: Skin integrity is maintained or improved  Outcome: Progressing     Problem: Fall Risk  Goal: Patient will remain free from falls  Outcome: Progressing       Progress made toward(s) clinical / shift goals:  Patient updated on the plan of care. All questions answered. Pain and skin assessed. Medicated per MAR. Hourly rounding provided.

## 2022-11-23 NOTE — PROGRESS NOTES
[facility] Medicine Daily Progress Note    Date of Service  11/22/2022    Chief Complaint  Found unresponsive    Hospital Course  Ashleigh Marquis is a 61 y.o. female with a history of breast cancer, chronic pain on oxycodone.  She was found unresponsive by a .  Paramedics arrived placed her on 15 L nonrebreather mask patient was tachycardic with heart rate in the 190s, she was hypothermic 93 °F.  Per report she was given Narcan and had some response.  Admitted 11/3/2022 with acute encephalopathy.  Labs here at the hospital showed acute kidney injury, hypokalemia, metabolic acidosis and concern of sepsis.  Patient was initially intubated placed on bicarb started on pressor support as well as broad-spectrum antibiotics of Zosyn and linezolid.  CT of the head showed by frontal subarachnoid hemorrhages.  MRA of the head and neck was negative for aneurysm.  The subarachnoid hemorrhage was felt due to be from trauma.  Patient was extubated 11/5 and weaned off of pressors.  Patient did grow out MSSA from her sputum antibiotics were adjusted to Unasyn.    Interval Problem Update    Patient is afebrile on room air  EGD with multiple gastric duodenal ulcers with no active bleeding  Discussed with nursing staff    I have discussed this patient's plan of care and discharge plan at IDT rounds today with Case Management, Nursing, Nursing leadership, and other members of the IDT team.    Consultants/Specialty  critical care GI    Code Status  DNAR/DNI    Disposition  Patient is not medically cleared for discharge.   Anticipate discharge to  SNF .  I have placed the appropriate orders for post-discharge needs.    Review of Systems  Review of Systems   Constitutional:  Positive for malaise/fatigue. Negative for chills and fever.   Respiratory:  Negative for shortness of breath.    Cardiovascular:  Negative for chest pain.   Gastrointestinal:  Positive for abdominal pain.   All other systems reviewed and are negative.      Physical Exam  Temp:  [36.1 °C (97 °F)-36.6 °C (97.8 °F)] 36.4 °C (97.5 °F)  Pulse:  [100-125] 112  Resp:  [16-20] 18  BP: ()/(56-85) 109/80  SpO2:  [95 %-100 %] 97 %    Physical Exam  Vitals and nursing note reviewed.   Constitutional:       General: She is not in acute distress.  HENT:      Head: Normocephalic and atraumatic.      Nose: Nose normal. No rhinorrhea.      Mouth/Throat:      Pharynx: No oropharyngeal exudate or posterior oropharyngeal erythema.   Eyes:      General: No scleral icterus.        Right eye: No discharge.         Left eye: No discharge.   Cardiovascular:      Rate and Rhythm: Regular rhythm. Tachycardia present.      Heart sounds: Normal heart sounds. No murmur heard.    No friction rub. No gallop.   Pulmonary:      Effort: Pulmonary effort is normal. No respiratory distress.      Breath sounds: No stridor. Decreased breath sounds present. No rhonchi or rales.   Chest:      Chest wall: No tenderness.   Abdominal:      General: Bowel sounds are normal. There is no distension.      Palpations: Abdomen is soft. There is no mass.      Tenderness: There is no abdominal tenderness. There is no rebound.   Musculoskeletal:         General: Swelling present. No tenderness.      Cervical back: Neck supple. No rigidity.   Skin:     General: Skin is warm and dry.      Coloration: Skin is not cyanotic or jaundiced.      Nails: There is no clubbing.   Neurological:      General: No focal deficit present.      Mental Status: She is alert.      Cranial Nerves: No cranial nerve deficit.      Motor: Weakness (Generalized) present.      Comments: Oriented to self and place   Psychiatric:         Mood and Affect: Mood normal.         Behavior: Behavior normal.       Fluids    Intake/Output Summary (Last 24 hours) at 11/22/2022 1618  Last data filed at 11/22/2022 1040  Gross per 24 hour   Intake 250 ml   Output --   Net 250 ml         Laboratory  Recent Labs     11/20/22  1827 11/21/22  1055  11/21/22  1820 11/22/22  0341 11/22/22  1435   WBC 22.7* 24.7*  --  21.9*  --    RBC 2.92* 2.86*  --  2.70*  --    HEMOGLOBIN 9.3* 9.3* 8.4* 8.7* 7.7*   HEMATOCRIT 28.0* 27.5* 25.9* 27.1* 24.7*   MCV 95.9 96.2  --  100.4*  --    MCH 31.8 32.5  --  32.2  --    MCHC 33.2* 33.8  --  32.1*  --    RDW 56.8* 58.1*  --  60.9*  --    PLATELETCT 310 350  --  313  --    MPV 12.0 11.5  --  11.7  --        Recent Labs     11/20/22  1827 11/21/22  1055 11/22/22  0341   SODIUM 138 139 144   POTASSIUM 4.0 5.1 5.1   CHLORIDE 105 108 112   CO2 18* 16* 19*   GLUCOSE 220* 177* 193*   BUN 81* 86* 81*   CREATININE 1.69* 1.67* 1.70*   CALCIUM 9.1 9.7 9.8                       Imaging  US-RUQ   Final Result      No acute sonographic abnormality. No gallstones.      CT-ABDOMEN-PELVIS W/O   Final Result      1.  No acute abnormality of the abdomen or pelvis within the limitations of this noncontrast exam. No evidence of nephroureterolithiasis or obstructive uropathy.   2.  16 mm nonspecific hypodensity in segment 4A of the liver. This finding is unlikely to be seen on ultrasound given its location, if indicated, further evaluation with multiphase CT or liver protocol MRI could be obtained.   3.  Atherosclerosis.      US-RENAL   Final Result      1.  Normal renal ultrasound.      DX-ABDOMEN FOR TUBE PLACEMENT   Final Result         Gastric drainage tube with tip projecting over the expected area of the first portion duodenum.      MR-BRAIN-WITH & W/O   Final Result      1.  Trace BILATERAL subarachnoid blood redemonstrated without appreciable change   2.  Possible RIGHT frontal lobe lesion with internal hemorrhage though I suspect this is imaging artifact related to motion. A metastatic lesion could have this appearance. Recommend repeating the contrast enhanced sequences when the patient may be    sedated or otherwise better able to remain still.   3.  Atrophy   4.  No evidence of acute ischemia, suggestion of other mass or other hemorrhage       EC-ECHOCARDIOGRAM COMPLETE W/ CONT   Final Result      CT-HEAD W/O   Final Result      Decreased trace subarachnoid hemorrhage in the bilateral frontal vertices. No new acute intracranial hemorrhage.         DX-CHEST-PORTABLE (1 VIEW)   Final Result      1.  Bilateral perihilar interstitial and early alveolar opacities, favoring edema over pneumonia. Still, pneumonia can be considered in the appropriate clinical settings.   2.  No lobar consolidation. No large pleural effusions.      DX-ABDOMEN FOR TUBE PLACEMENT   Final Result      1.  Feeding tube tip overlies the distal gastric body.      DX-ABDOMEN FOR TUBE PLACEMENT   Final Result      1.  NG tube coiled in the fundus of the stomach.      DX-ABDOMEN FOR TUBE PLACEMENT   Final Result      Cortrak feeding tube coiled in fundus of stomach.      DX-ABDOMEN FOR TUBE PLACEMENT   Final Result      1.  Stable appearance of the enteric tube which is looped in the distal stomach and proximal duodenum with the tip directed cephalad at the GE junction.      DX-ABDOMEN FOR TUBE PLACEMENT   Final Result      Enteric feeding tube appears to be kinked in the expected location of the second portion of the duodenum with the tip terminating in the left upper quadrant, likely intragastric.      MR-MRA HEAD-W/O   Final Result         1.  Marked motion artifact but grossly normal MR angiogram of the [place].      MR-MRA NECK-W/O   Final Result      1.  Normal MR angiogram of the carotid arteries and vertebral basilar system..      DX-CHEST-PORTABLE (1 VIEW)   Final Result      1.  No acute cardiac or pulmonary abnormalities are identified.   2.  NG tube side-port projects at the gastroesophageal junction. Recommend advancing.      CT-HEAD W/O   Final Result      1.  BILATERAL frontal subarachnoid blood   2.  Atrophy      Based solely on CT findings, the brain injury guideline category is mBIG 2.      Nondisplaced skull fx   SDH 4.1-7.9mm   IPH 4.1-7.9mm   SAH 1  hemisphere, >3 sulci, 1-3mm      The original BIG retrospective analysis found radiographic progression in 0% of BIG 1 patients and 2.6% BIG 2.         Findings were communicated with and acknowledged by JEREMY M GONDA via [employer] on 11/4/2022 4:35 PM.      DX-ABDOMEN FOR TUBE PLACEMENT   Final Result      Enteric tube placement which appears intragastric.      DX-CHEST-PORTABLE (1 VIEW)   Final Result         1.  No acute cardiopulmonary disease.   2.  Trace left pleural effusion      DX-CHEST-PORTABLE (1 VIEW)   Final Result      1.  Tubes and lines in good position.      2.  11 mm nodule in left mid chest.      DX-CHEST-PORTABLE (1 VIEW)   Final Result      1.  No acute cardiopulmonary abnormality.   2.  15 mm left upper lung nodule which is nonspecific. Further evaluation with chest CT may be performed.   3.  No consolidation or pleural effusion.   4.  Nasogastric tube side port is at the GE junction. Recommend advancement.             Assessment/Plan  * Acute metabolic encephalopathy  Assessment & Plan  Possibly due to oxycodone overdose and SAH    EEG consistent with encephalopathy no seizures noted  Ammonia normal  MRI trace bilateral subarachnoid blood and possible right frontal lobe lesion per radiologist suspected to be artifact secondary to motion but cannot rule out metastatic lesion    Avoid sedating agents frequent orientation  Consider repeating MRI with contrast when more clinically stable    Anemia associated with acute blood loss  Assessment & Plan  Secondary to upper GI bleed    Monitor hemoglobin and transfuse if less than 7  Bleeding clinically resolved EGD with multiple ulcers but no active bleeding    Transaminitis  Assessment & Plan  Patient was endorsing right upper quadrant abdominal pain.  Right upper quadrant ultrasound and CT abdomen and pelvis did not show any acute pathologies.  LFTs slightly elevated.  Tylenol discontinued  Monitor LFTs    Melena  Assessment & Plan  EGD on  11/22/2022 with esophagitis gastric and duodenal ulcers with no active bleeding    On IV Protonix will transition to p.o.    Leukocytosis  Assessment & Plan  Patient with elevated WBC likely from the steroids.  Procalcitonin is approximately normal.  For now continue to monitor off antibiotics.    Elevated troponin  Assessment & Plan  Likely demand ischemia  Echocardiogram with normal EF no regional wall motion abnormalities and hyperdynamic LV     Continue metoprolol       Seizure (HCC)  Assessment & Plan  Stable on  Keppra  EEGrevealed encephalopathy no active seizures  MRI?  Right frontal lobe lesion versus artifact  Repeat MRI with contrast when more clinically stable    Metastatic breast cancer (HCC)  Assessment & Plan  Follow-up with oncology after discharge    Dysphagia  Assessment & Plan      SLP and aspiration precautions  Continue thiamine and monitor electrolytes  Advance diet per SLP    Primary hypertension  Assessment & Plan  Controlled on amlodipine continue to monitor    Diarrhea  Assessment & Plan  C diff was neg  abd exam benign  Imodium PRN  Reviewed prior records under different MRN patient hospitalized at [facility] evaluated by GI Dr. Pate with EGD and colonoscopy pathology with lymphocytic infiltration felt to be secondary to her breast cancer therapy    Continue Imodium and  cholestyramine  11/17: Prednisone 40 BID until improving then taper after for 4-6 weeks    Subarachnoid hemorrhage (HCC)  Assessment & Plan  MRA head and neck was negative for aneurysm  Likely traumatic only trace blood noted on MRI        AYDEE (acute kidney injury) ([facility])  Assessment & Plan  Improved serum creatinine stable    Acute respiratory failure with hypoxia ([facility])  Assessment & Plan  Extubated 11/5  Previously completed antibiotic therapy for MSSA pneumonia  And aspiration pneumonia    Currently resolved       VTE prophylaxis: SCDs/TEDs    I have performed a physical exam and reviewed and updated ROS and Plan  today (11/22/2022). In review of yesterday's note (11/21/2022), there are no changes except as documented above.

## 2022-11-23 NOTE — DISCHARGE SUMMARY
Discharge Summary    CHIEF COMPLAINT ON ADMISSION  Chief Complaint   Patient presents with    ALOC     Pt found down by . Pt was cyanotic and cold on scene. LKW 11/1        Reason for Admission  ems      Admission Date  11/3/2022     CODE STATUS  DNAR/DNI    HPI & HOSPITAL COURSE    Ashleigh Marquis is a 61 y.o. female with a history of breast cancer, chronic pain on oxycodone.  She was found unresponsive by a .  Paramedics arrived placed her on 15 L nonrebreather mask patient was tachycardic with heart rate in the 190s, she was hypothermic 93 °F.  Per report she was given Narcan and had some response.  Admitted 11/3/2022 with acute encephalopathy.  Labs here at the hospital showed acute kidney injury, hypokalemia, metabolic acidosis and concern of sepsis.  Patient was initially intubated placed on bicarb started on pressor support as well as broad-spectrum antibiotics of Zosyn and linezolid.  CT of the head showed by frontal subarachnoid hemorrhages.  MRA of the head and neck was negative for aneurysm.  The subarachnoid hemorrhage was felt due to be from trauma.  Patient was extubated 11/5 and weaned off of pressors.  Patient did grow out MSSA from her sputum antibiotics were adjusted to Unasyn.  The patient was subsequently transferred to neurology floor.  She had a seizure and was started on Keppra.  MRI of the brain with contrast revealed a possible right frontal lobe lesion versus artifact and radiology recommended repeating MRI when more clinically stable.  She did not have any recurrence of her seizures.  Her renal function improved with hydration.  She has a history of chronic diarrhea with prior colonoscopy with GI consultants and had been maintained on prednisone it was felt that her diarrhea was related to her treatment for breast cancer.  She was restarted on prednisone and Lomotil and cholestyramine was added with improvement in her diarrhea.  The patient had melena with drop  in her hemoglobin she was evaluated by GI and underwent an upper endoscopy on 11/22/2022 which revealed esophagitis and multiple gastric and duodenal ulcers with no active bleeding or high risk stigmata for bleeding.  GI recommended that she be treated with omeprazole 40 twice daily for 8 weeks.  Patient's renal function is improving and her mental status is also improving.  She had elevated LFTs and had a CT of the abdomen and liver ultrasound CT of the abdomen revealed a lesion in segment 4A of the liver and she will need further evaluation with CT with contrast or MRI with contrast when her renal function is recovered and she is more clinically stable.  The patient has known history of metastatic breast cancer and is established with oncology.  She will need continued monitoring of her renal function and CBC  She will need to follow-up with her oncologist to discuss further treatments for her metastatic breast cancer  She will need to follow-up with GI with repeat upper endoscopy she had been evaluated by GI consultants in the past    Patient did not discharge yesterday due to transportation availability.  On evaluation this morning she is oriented to self and place.  She denies any abdominal pain.  She is hemodynamically stable on room air.  She is clinically stable to transfer to skilled nursing facility and will need close outpatient follow-up after discharge from SNF with her oncologist and GI          Therefore, she is discharged in good and stable condition to skilled nursing facility.    The patient met 2-midnight criteria for an inpatient stay at the time of discharge.      FOLLOW UP ITEMS POST DISCHARGE  Follow-up with oncology  Patient will need MRI of brain with contrast to evaluate whether the abnormality noted was related to motion artifact versus underlying lesion given her history of breast cancer  She will need CT or MRI liver protocol when her renal function is improved to evaluate possible liver  lesion noted on CT    She will need monitoring of her CBC and chemistry panel  She will need to follow-up with GI for repeat upper endoscopy and follow-up on pending biopsies from her EGD on 11/22/2022  GI recommended to continue Prilosec 40 twice daily for 8 weeks  She is not a good candidate for narcotics given her accidental overdose and history of substance abuse    DISCHARGE DIAGNOSES  Principal Problem:    Acute metabolic encephalopathy POA: Unknown  Active Problems:    AYDEE (acute kidney injury) (HCC) POA: Unknown    Hypomagnesemia POA: Unknown    Subarachnoid hemorrhage (HCC) POA: Unknown    Hypophosphatemia POA: Unknown    Diarrhea POA: Unknown    Primary hypertension POA: Unknown    Dysphagia POA: Unknown    Metastatic breast cancer (HCC) POA: Unknown    Seizure (HCC) POA: Unknown    Elevated troponin POA: Unknown    Leukocytosis POA: Unknown    Melena POA: Unknown    Transaminitis POA: Unknown    Anemia associated with acute blood loss POA: Unknown  Resolved Problems:    Narcotic overdose, accidental or unintentional, initial encounter ([facility]) POA: Yes    Acute respiratory failure with hypoxia (HCC) POA: Unknown    Metabolic acidosis POA: Unknown    Sepsis (HCC) POA: Unknown    Hypokalemia POA: Unknown    Shock (HCC) POA: Unknown    Hypothermia associated with environmental change POA: Unknown    Aspiration pneumonia (HCC) POA: Unknown    Anemia POA: Unknown    Thrombocytopenia (HCC) POA: Unknown    Hypernatremia POA: Unknown    Acute gastrointestinal hemorrhage POA: Yes      FOLLOW UP  No future appointments.  [facility]  [address] [address]  [phone]          MEDICATIONS ON DISCHARGE     Medication List        START taking these medications        Instructions   amLODIPine 10 MG Tabs  Start taking on: November 24, 2022  Commonly known as: NORVASC   Take 1 Tablet by mouth every day.  Dose: 10 mg     cholestyramine 4 GM Pack  Commonly known as: QUESTRAN,PREVALITE   Take 1  Packet by mouth 2 times a day.  Dose: 4 g     levETIRAcetam 500 MG Tabs  Commonly known as: KEPPRA   Take 1 Tablet by mouth 2 times a day.  Dose: 500 mg     metoprolol tartrate 25 MG Tabs  Commonly known as: LOPRESSOR   Take 1 Tablet by mouth 2 times a day.  Dose: 25 mg            CHANGE how you take these medications        Instructions   acetaminophen 500 MG Tabs  What changed:   how much to take  when to take this  reasons to take this  Commonly known as: TYLENOL   Take 1 Tablet by mouth every 6 hours as needed for Mild Pain, Moderate Pain or Fever. Indications: Pain  Dose: 500 mg     omeprazole 40 MG delayed-release capsule  What changed: when to take this  Commonly known as: PRILOSEC   Take 1 Capsule by mouth 2 times a day.  Dose: 40 mg            CONTINUE taking these medications        Instructions   albuterol 108 (90 Base) MCG/ACT Aers inhalation aerosol   Inhale 2 Puffs 1 time a day as needed for Shortness of Breath.  Dose: 2 Puff     carisoprodol 350 MG Tabs  Commonly known as: SOMA   Take 350 mg by mouth every 6 hours as needed for Muscle Spasms.  Dose: 350 mg     diphenoxylate-atropine 2.5-0.025 MG Tabs  Commonly known as: LOMOTIL   Take 1 Tablet by mouth 4 times a day as needed for Diarrhea.  Dose: 1 Tablet     multivitamin Tabs   Take 1 Tablet by mouth every day.  Dose: 1 Tablet     ondansetron 4 MG Tbdp  Commonly known as: ZOFRAN ODT   Take 4 mg by mouth every four hours as needed for Nausea.  Dose: 4 mg     predniSONE 10 MG Tabs  Commonly known as: DELTASONE   Take 10 mg by mouth every day.  Dose: 10 mg     prochlorperazine 10 MG Tabs  Commonly known as: COMPAZINE   Take 10 mg by mouth every 6 hours as needed for Nausea/Vomiting.  Dose: 10 mg            STOP taking these medications      clonazepam 2 MG tablet  Commonly known as: KLONOPIN     HYDROcodone/acetaminophen  MG Tabs  Commonly known as: NORCO     lisinopril 10 MG Tabs  Commonly known as: PRINIVIL     NALOXONE HCL NA     oxyCODONE  immediate release 10 MG immediate release tablet  Commonly known as: ROXICODONE              Allergies  Allergies   Allergen Reactions    Aspirin Unspecified     Severely sick as a child     Sulfa Drugs Vomiting    Toradol Vomiting       DIET  Orders Placed This Encounter   Procedures    Diet Order Diet: Level 4 - Pureed; Liquid level: Level 2 - Mildly Thick     Standing Status:   Standing     Number of Occurrences:   1     Order Specific Question:   Diet:     Answer:   Level 4 - Pureed [25]     Order Specific Question:   Liquid level     Answer:   Level 2 - Mildly Thick       ACTIVITY  As tolerated.  Weight bearing as tolerated    LINES, DRAINS, AND WOUNDS  This is an automated list. Peripheral IVs will be removed prior to discharge.  Dual Lumen Implantable Port 11/07/22 Right Chest (Active)   Dual Lumen Lateral Port Access Date 11/21/22 11/21/22 1034   Dual Lumen Lateral Port Access Time 1034 11/21/22 1034   Dual Lumen Lateral Port Accessed by Lis IRENE 11/21/22 1034   Dual Lumen Lateral Port Needle Size 20 11/21/22 1034   Dual Lumen Lateral Port Needle Length 3/4 in 11/21/22 1034   Dual Lumen Lateral Port Line Status Heparin locked 11/22/22 2015   Dual Lumen Lateral Port De-Access Date 11/22/22 11/22/22 1700   Dual Lumen Lateral Port De-Access Time 1640 11/22/22 1700   Site Assessment Clean;Dry;Intact 11/22/22 2015   Dressing Type Biopatch;Transparent;Occlusive 11/22/22 2015   Dressing Status Clean;Dry;Intact 11/22/22 2015   Dressing Intervention N/A 11/22/22 2015   Dressing Change Due 11/26/22 11/21/22 1034   Date Primary Tubing Changed 11/14/22 11/14/22 1336   Date Secondary Tubing Changed 11/14/22 11/14/22 1336   Date Needle Changed 11/22/22 11/22/22 1700   Next Needle Change Date 11/12/22 11/10/22 1726   Date IV Connector(s) Changed 11/21/22 11/21/22 1034   NEXT Primary Tubing Change  11/15/22 11/14/22 1336   NEXT Secondary Tubing Change  11/15/22 11/14/22 1336   NEXT IV Connector(s) Change 11/19/22  11/14/22 1336   Line Necessity Assessed Chemotherapy (Continuous Infusion of Vesicant Therapy) 11/21/22 2030   Line Necessity Reviewed With oDn 11/21/22 1034       Moisture Associated Skin Damage Buttock;Perineum (Active)   Wound Image   11/09/22 2227   NEXT Weekly Photo (Inpatient Only) 11/09/22 11/08/22 2000   Drainage Amount None 11/21/22 2030   Drainage Description Serous 11/13/22 2000   Periwound Assessment Pink 11/21/22 2030   IAD Cleansing Foam Cleanser/Washcloth 11/21/22 2030   Periwound Protectant Barrier Paste 11/21/22 2030   IAD Containment Device Bowel Management System 11/20/22 1916       Wound 11/07/22 Traumatic Elbow Right (Active)   Wound Image   11/07/22 1600   Site Assessment Clean;Dry;Intact 11/21/22 2030   Periwound Assessment Clean;Dry;Intact 11/21/22 2030   Margins Attached edges 11/20/22 1916   Closure Approximated 11/21/22 2030   Drainage Amount None 11/21/22 2030   Treatments Offloading 11/21/22 2030   Dressing Options Open to Air 11/21/22 2030   Dressing Status Open to Air 11/21/22 2030       Wound 11/07/22 Traumatic Back Lateral Right (Active)   Wound Image   11/07/22 1600   Site Assessment Clean;Dry;Intact 11/21/22 2030   Periwound Assessment Intact 11/21/22 2030   Margins Attached edges 11/21/22 2030   Closure Open to air 11/21/22 2030   Drainage Amount None 11/21/22 2030   Treatments Offloading 11/21/22 2030   Dressing Options Open to Air 11/21/22 2030   Dressing Status Open to Air 11/21/22 2030       Wound 11/07/22 MTH 3;MTH 4 Left (Active)   Wound Image   11/07/22 1600   Site Assessment Red 11/21/22 2030   Periwound Assessment Clean;Dry;Intact 11/21/22 2030   Margins Attached edges 11/21/22 2030   Closure Open to air 11/21/22 2030   Drainage Amount None 11/21/22 2030   Treatments Moisturizing cream;Offloading 11/21/22 2030   Dressing Options Open to Air 11/21/22 2030   Dressing Status Open to Air 11/21/22 2030                  MENTAL STATUS ON TRANSFER              CONSULTATIONS  GI  Critical care    PROCEDURES    EGD on 11/22/2022  OPERATIVE FINDINGS:     1. Esophagus: Z line at 34 cm. Sloughing esophagitis and ulcer formation for 10+cm at the distal esophagus.   2. Stomach: Multiple small ulcers. Biopsied for HP.   3. Duodenum: Multiple moderate to large ulcers 1-3cm from bulb to proximal 3rd portion.      All ulcers were checked, no bleeding, no high risk stigmata to treat.      The patient could have peptic (acid-related) gut injury, or NSAID or ischemic injury.      Recs:  Keep ppi 40mg bid oral for 8 weeks. Need outpatient GI ([facility] or [facility]) to repeat EGD in Jan 2023.   Okay to resume diet.   Inpatient GI will follow up the pathology.        LABORATORY  Lab Results   Component Value Date    SODIUM 145 11/23/2022    POTASSIUM 4.5 11/23/2022    CHLORIDE 109 11/23/2022    CO2 22 11/23/2022    GLUCOSE 180 (H) 11/23/2022    BUN 63 (H) 11/23/2022    CREATININE 1.45 (H) 11/23/2022        Lab Results   Component Value Date    WBC 12.9 (H) 11/23/2022    HEMOGLOBIN 7.2 (L) 11/23/2022    HEMATOCRIT 22.8 (L) 11/23/2022    PLATELETCT 259 11/23/2022        Total time of the discharge process exceeds 35 minutes.

## 2022-11-23 NOTE — DISCHARGE PLANNING
Agency/Facility Name: [facility]  Plan or Request: TIFFANIE Hill left vm regarding referral status.    0940- Spoke To: García  Agency/Facility Name: [facility]  Plan or Request: requested referral to be resent.    0953- Spoke To: Janel  Agency/Facility Name: [facility]  Plan or Request: declined / no medicaid beds    1129- Spoke To: García  Agency/Facility Name: [facility]  Plan or Request: accepted, bed available today    1330- García aware of 1400 transport time.

## 2022-11-24 NOTE — PROGRESS NOTES
Pt dc to [facility] via [facility] transport. Pt stable, A/O x2. Attempted to give report to Bryan x, no answer, unable to leave .

## 2022-11-24 NOTE — PROGRESS NOTES
Patient is alert and oriented x 3. Confused at times. Discharge tomorrow by 10-11 am via [facility] Transport going to [facility]. Bed in lowest position, Bed locked. No IV access, call light and personal items within reached. Bed alarm in placed. Kept safe and monitored.

## 2022-11-24 NOTE — PROGRESS NOTES
[facility] Medicine Daily Progress Note    Date of Service  11/24/2022    Chief Complaint  Found unresponsive    Hospital Course  Ashleigh Marquis is a 61 y.o. female with a history of breast cancer, chronic pain on oxycodone.  She was found unresponsive by a .  Paramedics arrived placed her on 15 L nonrebreather mask patient was tachycardic with heart rate in the 190s, she was hypothermic 93 °F.  Per report she was given Narcan and had some response.  Admitted 11/3/2022 with acute encephalopathy.  Labs here at the hospital showed acute kidney injury, hypokalemia, metabolic acidosis and concern of sepsis.  Patient was initially intubated placed on bicarb started on pressor support as well as broad-spectrum antibiotics of Zosyn and linezolid.  CT of the head showed by frontal subarachnoid hemorrhages.  MRA of the head and neck was negative for aneurysm.  The subarachnoid hemorrhage was felt due to be from trauma.  Patient was extubated 11/5 and weaned off of pressors.  Patient did grow out MSSA from her sputum antibiotics were adjusted to Unasyn.    Interval Problem Update    Nursing staff report that patient took some of her home medications prednisone and Compazine.  Hemodynamically stable  Denies pain  Had 1 small bowel movement  Hemoglobin 7.2    I have discussed this patient's plan of care and discharge plan at IDT rounds today with Case Management, Nursing, Nursing leadership, and other members of the IDT team.    Consultants/Specialty  critical care GI    Code Status  DNAR/DNI    Disposition  Patient is not medically cleared for discharge.   Anticipate discharge to  SNF .  I have placed the appropriate orders for post-discharge needs.    Review of Systems  Review of Systems   Constitutional:  Negative for chills and fever.   Respiratory:  Negative for shortness of breath.    Cardiovascular:  Negative for chest pain.   Gastrointestinal:  Negative for abdominal pain and nausea.   All other systems  reviewed and are negative.     Physical Exam  Temp:  [36 °C (96.8 °F)-36.5 °C (97.7 °F)] 36 °C (96.8 °F)  Pulse:  [100-108] 108  Resp:  [17-19] 18  BP: ()/(52-71) 92/52  SpO2:  [0 %-96 %] 94 %    Physical Exam  Vitals and nursing note reviewed.   Constitutional:       General: She is not in acute distress.  HENT:      Head: Normocephalic and atraumatic.      Nose: Nose normal. No rhinorrhea.      Mouth/Throat:      Pharynx: No oropharyngeal exudate or posterior oropharyngeal erythema.   Eyes:      General: No scleral icterus.        Right eye: No discharge.         Left eye: No discharge.   Cardiovascular:      Rate and Rhythm: Normal rate and regular rhythm.      Heart sounds: Normal heart sounds. No murmur heard.    No friction rub. No gallop.   Pulmonary:      Effort: Pulmonary effort is normal. No respiratory distress.      Breath sounds: Normal breath sounds. No stridor. No wheezing, rhonchi or rales.   Chest:      Chest wall: No tenderness.   Abdominal:      General: Bowel sounds are normal. There is no distension.      Palpations: Abdomen is soft. There is no mass.      Tenderness: There is no abdominal tenderness. There is no rebound.   Musculoskeletal:         General: Swelling present. No tenderness.      Cervical back: Neck supple. No rigidity.   Skin:     General: Skin is warm and dry.      Coloration: Skin is not cyanotic or jaundiced.      Nails: There is no clubbing.   Neurological:      General: No focal deficit present.      Mental Status: She is alert.      Cranial Nerves: No cranial nerve deficit.      Motor: No weakness.      Comments: Oriented to self and place   Psychiatric:         Mood and Affect: Mood normal.         Behavior: Behavior normal.       Fluids  No intake or output data in the 24 hours ending 11/24/22 0910      Laboratory  Recent Labs     11/21/22  1055 11/21/22  1820 11/22/22  0341 11/22/22  1435 11/23/22  0050   WBC 24.7*  --  21.9*  --  12.9*   RBC 2.86*  --  2.70*  --   2.22*   HEMOGLOBIN 9.3*   < > 8.7* 7.7* 7.2*   HEMATOCRIT 27.5*   < > 27.1* 24.7* 22.8*   MCV 96.2  --  100.4*  --  102.7*   MCH 32.5  --  32.2  --  32.4   MCHC 33.8  --  32.1*  --  31.6*   RDW 58.1*  --  60.9*  --  62.9*   PLATELETCT 350  --  313  --  259   MPV 11.5  --  11.7  --  11.7    < > = values in this interval not displayed.       Recent Labs     11/21/22  1055 11/22/22  0341 11/23/22  0050   SODIUM 139 144 145   POTASSIUM 5.1 5.1 4.5   CHLORIDE 108 112 109   CO2 16* 19* 22   GLUCOSE 177* 193* 180*   BUN 86* 81* 63*   CREATININE 1.67* 1.70* 1.45*   CALCIUM 9.7 9.8 9.2                       Imaging  US-RUQ   Final Result      No acute sonographic abnormality. No gallstones.      CT-ABDOMEN-PELVIS W/O   Final Result      1.  No acute abnormality of the abdomen or pelvis within the limitations of this noncontrast exam. No evidence of nephroureterolithiasis or obstructive uropathy.   2.  16 mm nonspecific hypodensity in segment 4A of the liver. This finding is unlikely to be seen on ultrasound given its location, if indicated, further evaluation with multiphase CT or liver protocol MRI could be obtained.   3.  Atherosclerosis.      US-RENAL   Final Result      1.  Normal renal ultrasound.      DX-ABDOMEN FOR TUBE PLACEMENT   Final Result         Gastric drainage tube with tip projecting over the expected area of the first portion duodenum.      MR-BRAIN-WITH & W/O   Final Result      1.  Trace BILATERAL subarachnoid blood redemonstrated without appreciable change   2.  Possible RIGHT frontal lobe lesion with internal hemorrhage though I suspect this is imaging artifact related to motion. A metastatic lesion could have this appearance. Recommend repeating the contrast enhanced sequences when the patient may be    sedated or otherwise better able to remain still.   3.  Atrophy   4.  No evidence of acute ischemia, suggestion of other mass or other hemorrhage      EC-ECHOCARDIOGRAM COMPLETE W/ CONT   Final Result       CT-HEAD W/O   Final Result      Decreased trace subarachnoid hemorrhage in the bilateral frontal vertices. No new acute intracranial hemorrhage.         DX-CHEST-PORTABLE (1 VIEW)   Final Result      1.  Bilateral perihilar interstitial and early alveolar opacities, favoring edema over pneumonia. Still, pneumonia can be considered in the appropriate clinical settings.   2.  No lobar consolidation. No large pleural effusions.      DX-ABDOMEN FOR TUBE PLACEMENT   Final Result      1.  Feeding tube tip overlies the distal gastric body.      DX-ABDOMEN FOR TUBE PLACEMENT   Final Result      1.  NG tube coiled in the fundus of the stomach.      DX-ABDOMEN FOR TUBE PLACEMENT   Final Result      Cortrak feeding tube coiled in fundus of stomach.      DX-ABDOMEN FOR TUBE PLACEMENT   Final Result      1.  Stable appearance of the enteric tube which is looped in the distal stomach and proximal duodenum with the tip directed cephalad at the GE junction.      DX-ABDOMEN FOR TUBE PLACEMENT   Final Result      Enteric feeding tube appears to be kinked in the expected location of the second portion of the duodenum with the tip terminating in the left upper quadrant, likely intragastric.      MR-MRA HEAD-W/O   Final Result         1.  Marked motion artifact but grossly normal MR angiogram of the [place].      MR-MRA NECK-W/O   Final Result      1.  Normal MR angiogram of the carotid arteries and vertebral basilar system..      DX-CHEST-PORTABLE (1 VIEW)   Final Result      1.  No acute cardiac or pulmonary abnormalities are identified.   2.  NG tube side-port projects at the gastroesophageal junction. Recommend advancing.      CT-HEAD W/O   Final Result      1.  BILATERAL frontal subarachnoid blood   2.  Atrophy      Based solely on CT findings, the brain injury guideline category is mBIG 2.      Nondisplaced skull fx   SDH 4.1-7.9mm   IPH 4.1-7.9mm   SAH 1 hemisphere, >3 sulci, 1-3mm      The original BIG  retrospective analysis found radiographic progression in 0% of BIG 1 patients and 2.6% BIG 2.         Findings were communicated with and acknowledged by JEREMY M GONDA via [employer] on 11/4/2022 4:35 PM.      DX-ABDOMEN FOR TUBE PLACEMENT   Final Result      Enteric tube placement which appears intragastric.      DX-CHEST-PORTABLE (1 VIEW)   Final Result         1.  No acute cardiopulmonary disease.   2.  Trace left pleural effusion      DX-CHEST-PORTABLE (1 VIEW)   Final Result      1.  Tubes and lines in good position.      2.  11 mm nodule in left mid chest.      DX-CHEST-PORTABLE (1 VIEW)   Final Result      1.  No acute cardiopulmonary abnormality.   2.  15 mm left upper lung nodule which is nonspecific. Further evaluation with chest CT may be performed.   3.  No consolidation or pleural effusion.   4.  Nasogastric tube side port is at the GE junction. Recommend advancement.             Assessment/Plan  * Acute metabolic encephalopathy  Assessment & Plan  Possibly due to oxycodone overdose and SAH    EEG consistent with encephalopathy no seizures noted  Ammonia normal  MRI trace bilateral subarachnoid blood and possible right frontal lobe lesion per radiologist suspected to be artifact secondary to motion but cannot rule out metastatic lesion    Avoid sedating agents frequent orientation  Will need repeat MRI with contrast as outpatient    Anemia associated with acute blood loss  Assessment & Plan  Secondary to upper GI bleed    Monitor hemoglobin and transfuse if less than 7  Bleeding clinically resolved EGD with multiple ulcers but no active bleeding    Transaminitis  Assessment & Plan  Patient was endorsing right upper quadrant abdominal pain.  Right upper quadrant ultrasound and CT abdomen and pelvis did not show any acute pathologies.  LFTs slightly elevated.  Tylenol discontinued  Monitor LFTs    Will need CT with contrast or MRI with contrast as outpatient given possible liver  lesion    Melena  Assessment & Plan  EGD on 11/22/2022 with esophagitis gastric and duodenal ulcers with no active bleeding    Hemoglobin drop likely dilutional clinically patient is hemodynamically stable with no further signs of active bleed  Continue PPI and outpatient follow-up for repeat endoscopy    Leukocytosis  Assessment & Plan  Improved monitor clinically    Elevated troponin  Assessment & Plan  Likely demand ischemia  Echocardiogram with normal EF no regional wall motion abnormalities and hyperdynamic LV     Continue metoprolol       Seizure (HCC)  Assessment & Plan  Stable on  Keppra  EEGrevealed encephalopathy no active seizures  MRI?  Right frontal lobe lesion versus artifact  Repeat MRI with contrast when more clinically stable    Metastatic breast cancer (HCC)  Assessment & Plan  Follow-up with oncology after discharge    Dysphagia  Assessment & Plan      SLP and aspiration precautions  Continue thiamine and monitor electrolytes  Advance diet per SLP    Primary hypertension  Assessment & Plan  Controlled on amlodipine continue to monitor    Diarrhea  Assessment & Plan  C diff was neg  abd exam benign  Imodium PRN  Reviewed prior records under different MRN patient hospitalized at [facility] evaluated by GI Dr. Pate with EGD and colonoscopy pathology with lymphocytic infiltration felt to be secondary to her breast cancer therapy    Continue Imodium and  cholestyramine  Clinically improved start tapering prednisone outpatient follow-up with GI consultants    Subarachnoid hemorrhage (HCC)  Assessment & Plan  MRA head and neck was negative for aneurysm  Likely traumatic only trace blood noted on MRI        AYDEE (acute kidney injury) ([facility])  Assessment & Plan  Improved       VTE prophylaxis: SCDs/TEDs    I have performed a physical exam and reviewed and updated ROS and Plan today (11/24/2022). In review of yesterday's note (11/23/2022), there are no changes except as documented above.

## 2022-11-24 NOTE — DISCHARGE PLANNING
Contacted by floor to assist with arranging transport. MT called.    [facility] Trip #: [id number]      Scheduled Date: 11/24/2022  Scheduled Time: [id number]    [facility]      Destination: [address]    Elva Mckee notified of above via Voalte.

## 2022-11-29 PROBLEM — R57.8 HEMORRHAGIC SHOCK (HCC): Status: ACTIVE | Noted: 2022-01-01

## 2022-11-29 PROBLEM — C50.911 MALIGNANT NEOPLASM OF RIGHT FEMALE BREAST (HCC): Status: ACTIVE | Noted: 2021-04-15

## 2022-11-29 PROBLEM — K92.2 GI BLEED: Status: ACTIVE | Noted: 2022-01-01

## 2022-11-29 NOTE — PROGRESS NOTES
62 year old female admitted with BRBPR; hbg 5.5 in the ER (prior 7s)    SBP initially in the 80s which abruptly responded to IVF    At the time of my evaluation she was awake, alert, no distress. Abd s/mttp diffusely without rebound or guarding. Cap refill 2 seconds, wwp ext, no edema, RRR.     She reports last bloody BM yesterday - her LA was 7 upon arrival but as she is now well appearing after IVF without active bleeding so IMCU is ok.     GIB   Hemodynamcally stable after IVF  GI consulted by ERP  TEG and CTA ordered  PPI IV   Serial lactate  She is ok to be monitored in the IMCU    Jim Sandoval M.D.

## 2022-11-29 NOTE — H&P
Hospital Medicine History & Physical Note    Date of Service  11/29/2022    Primary Care Physician  BYRON Griffith.    Consultants  GI    Specialist Names:     Code Status  Full Code    Chief Complaint  Chief Complaint   Patient presents with    Abdominal Pain     RUQ pain with nausea, vomiting and black tarry stool for a few days. History of GI bleed.       History of Presenting Illness  Ashleigh Marquis is a 62 y.o. female who presented 11/29/2022 with previous medical history of includes alcohol abuse, breast cancer with known mets to the lung,, she reports that she is not drinking actively at this point, depression, bipolar disorder, chronic pain, hypertension, recent subarachnoid hemorrhage, and recent GI bleed.      Patient was just in the hospital from November 3 to 24.  She was found down out in community, and was brought in here hypothermic with a temperature 93 degrees core.  Imaging showed frontal subarachnoid hemorrhage, for which the patient was treated nonoperatively.  She also had an MSSA pneumonia for which she was treated with antibiotics.  During her hospital stay she had a seizure, and was placed on Keppra as a result.  Significantly her hospital stay was also complicated by upper GI bleed for which she underwent endoscopy on November 22.  She was found to have esophagitis with multiple gastric and duodenal ulcers.  She did not have any esophageal varices at that time.    Patient presents back today with complaint of upper GI pain, and bloody emesis.  Symptoms began this morning.  She is also had diarrhea, however this is chronic for her.  She does note some black bowel movements earlier today.  Here in the emergency room the, the patient was initially hypotensive with systolic in the 70s, and had a lactic acid of 7.  She has since been transfused of 2 units of PRBCs, and given fluids.  On my examination she has systolic in the 90s, with a heart rate in the 80s.  GI has been consulted.    I  discussed the plan of care with patient.    Review of Systems  Review of Systems   Constitutional:  Negative for chills and fever.   HENT:  Negative for nosebleeds and sore throat.    Eyes:  Negative for blurred vision and double vision.   Respiratory:  Positive for cough and shortness of breath.    Cardiovascular:  Negative for chest pain, palpitations and leg swelling.   Gastrointestinal:  Positive for abdominal pain, heartburn and melena. Negative for diarrhea, nausea and vomiting.   Genitourinary:  Negative for dysuria and urgency.   Musculoskeletal:  Positive for back pain, joint pain, myalgias and neck pain.   Skin:  Negative for rash.   Neurological:  Negative for dizziness, loss of consciousness and headaches.     Past Medical History   has a past medical history of Alcoholism ([facility]), Anemia, Anxiety, Anxiety and depression (03/23/2021), Arthritis, ASTHMA, Breath shortness, Bronchitis (01/18/2022), Cancer ([facility]) (1981), Chronic low back pain, Congestive heart failure ([facility]), Dental disorder, Depression, EtOH dependence ([facility]), Fall, GERD (gastroesophageal reflux disease), Heart burn, HTN, Hypertension, Indigestion, Muscle disorder, OSTEOPOROSIS, Other specified symptom associated with female genital organs, Pain (03/23/2021), Pneumonia (01/18/2022), Psychiatric disorder, PTSD (post-traumatic stress disorder), Renal disorder, Renal failure (Around 2019), Seizure ([facility]), Seizure ([facility]) (11/03/2020), Ulcer, and Vitamin D deficiency.    Surgical History   has a past surgical history that includes hernia repair (1977); cysto stent placemnt pre surg (10/7/2010); cystoscopy stent placement (11/9/2010); ureteroscopy (11/9/2010); lasertripsy (11/9/2010); stent removal (11/9/2010); gastroscopy-endo (N/A, 10/3/2018); pr mastectomy, partial (Right, 3/26/2021); axillary node dissection (Right, 3/26/2021); other (Left, 2010); pr upper gi endoscopy,diagnosis (N/A, 10/22/2022); pr colonoscopy,diagnostic (N/A, 10/22/2022); pr  upper gi endoscopy,diagnosis (N/A, 11/22/2022); and pr upper gi endoscopy,biopsy (N/A, 11/22/2022).     Family History  family history includes Cancer in her paternal grandmother; Depression in an other family member; Diabetes in her father; Heart Disease in her father; Hypertension in her father.   Family history reviewed with patient. There is no family history that is pertinent to the chief complaint.     Social History   reports that she does not have a smoking history on file. She has quit using smokeless tobacco.  Her smokeless tobacco use included chew. She reports that she does not currently use alcohol. She reports that she does not currently use drugs.    Allergies  Allergies   Allergen Reactions    Aspirin Unspecified     Severely sick as a child     Sulfa Drugs Vomiting    Toradol Vomiting       Medications  Prior to Admission Medications   Prescriptions Last Dose Informant Patient Reported? Taking?   VENTOLIN  (90 Base) MCG/ACT Aero Soln inhalation aerosol UNK at [facility], Patient No No   Sig: Inhale 2 Puffs 1 time a day as needed for Shortness of Breath.   acetaminophen (TYLENOL) 500 MG Tab UNK at [facility], Patient No No   Sig: Take 1 Tablet by mouth every 6 hours as needed for Mild Pain, Moderate Pain or Fever. Indications: Pain   amLODIPine (NORVASC) 10 MG Tab UNK at [facility], Patient No No   Sig: Take 1 Tablet by mouth every day.   carisoprodol (SOMA) 350 MG Tab UNK at [facility], Patient Yes No   Sig: Take 350 mg by mouth every 6 hours as needed for Muscle Spasms.   cholestyramine (QUESTRAN,PREVALITE) 4 GM Pack UNK at [facility], Patient No No   Sig: Take 1 Packet by mouth 2 times a day.   clonazepam (KLONOPIN) 2 MG tablet UNK at [facility], Patient Yes No   Sig: Take 2 mg by mouth 3 times a day as needed.   diphenoxylate-atropine (LOMOTIL) 2.5-0.025 MG Tab UNK at [facility], Patient Yes No   Sig: Take 1 Tablet by mouth 4 times a day as needed for Diarrhea.    levETIRAcetam (KEPPRA) 500 MG Tab UNK at [facility], Patient No No   Sig: Take 1 Tablet by mouth 2 times a day.   loperamide (IMODIUM) 2 MG Cap UNK at [facility], Patient No No   Sig: Take 1 Capsule by mouth 4 times a day as needed for Diarrhea.   metoprolol tartrate (LOPRESSOR) 25 MG Tab UNK at [facility], Patient No No   Sig: Take 1 Tablet by mouth 2 times a day.   multivitamin (THERAGRAN) Tab UNK at [facility], Patient Yes No   Sig: Take 1 Tablet by mouth every day.   nystatin (MYCOSTATIN) powder UNK at [facility], Patient No No   Sig: Apply 1 g topically 2 times a day.   omeprazole (PRILOSEC) 40 MG delayed-release capsule UNK at [facility], Patient No No   Sig: Take 1 Capsule by mouth 2 times a day.   ondansetron (ZOFRAN) 4 MG Tab tablet UNK at [facility], Patient No No   Sig: Take 1 Tablet by mouth every four hours as needed for Nausea/Vomiting (for nausea, vomiting).   oxyCODONE immediate release (ROXICODONE) 10 MG immediate release tablet UNK at [facility], Patient Yes No   Sig: Take 10 mg by mouth every four hours as needed (Pain).   predniSONE (DELTASONE) 10 MG Tab UNK at [facility], Patient Yes No   Sig: Take 10 mg by mouth every day.   prochlorperazine (COMPAZINE) 10 MG Tab UNK at [facility], Patient No No   Sig: Take 1 Tablet by mouth every 6 hours as needed (for nausea, vomiting).      Facility-Administered Medications: None       Physical Exam  Temp:  [35.6 °C (96 °F)-36.1 °C (97 °F)] 35.9 °C (96.7 °F)  Pulse:  [104-118] 108  Resp:  [10-26] 12  BP: ()/(50-62) 95/60  SpO2:  [88 %-100 %] 100 %  Blood Pressure: (!) 95/60   Temperature: 35.9 °C (96.7 °F)   Pulse: (!) 108   Respiration: 12   Pulse Oximetry: 100 %       Physical Exam  Constitutional:       General: She is not in acute distress.     Appearance: Normal appearance. She is well-developed. She is not diaphoretic.   HENT:      Head: Normocephalic and atraumatic.   Neck:      Vascular: No JVD.    Cardiovascular:      Rate and Rhythm: Normal rate and regular rhythm.      Heart sounds: Murmur heard.   Pulmonary:      Effort: Pulmonary effort is normal. No respiratory distress.      Breath sounds: No stridor. No wheezing or rales.   Abdominal:      Palpations: Abdomen is soft.      Tenderness: There is no abdominal tenderness. There is no guarding or rebound.   Musculoskeletal:         General: No tenderness.      Right lower leg: No edema.      Left lower leg: No edema.   Skin:     General: Skin is warm and dry.      Capillary Refill: Capillary refill takes less than 2 seconds.      Coloration: Skin is not jaundiced.      Findings: No rash.   Neurological:      General: No focal deficit present.      Mental Status: She is alert and oriented to person, place, and time.   Psychiatric:         Mood and Affect: Mood normal. Affect is blunt and flat.         Behavior: Behavior normal.         Thought Content: Thought content normal.       Laboratory:  Recent Labs     11/29/22  1207   WBC 13.9*   RBC 1.63*   HEMOGLOBIN 5.5*   HEMATOCRIT 18.0*   .4*   MCH 33.7*   MCHC 30.6*   RDW 80.5*   PLATELETCT 153*   MPV 11.8     Recent Labs     11/29/22  1207   SODIUM 139   POTASSIUM 3.0*   CHLORIDE 99   CO2 24   GLUCOSE 137*   BUN 27*   CREATININE 0.83   CALCIUM 8.2*     Recent Labs     11/29/22  1207   ALTSGPT 735*   ASTSGOT 443*   ALKPHOSPHAT 346*   TBILIRUBIN 0.5   LIPASE 65   GLUCOSE 137*     Recent Labs     11/29/22  1207   APTT 23.2*   INR 1.20*     No results for input(s): NTPROBNP in the last 72 hours.      No results for input(s): TROPONINT in the last 72 hours.    Imaging:  DX-CHEST-PORTABLE (1 VIEW)   Final Result      1.  No acute cardiac or pulmonary abnormalities are identified.      CTA ABDOMEN PELVIS W & W/O POST PROCESS    (Results Pending)           Assessment/Plan:  Justification for Admission Status  I anticipate this patient will require at least two midnights for appropriate medical management,  necessitating inpatient admission because patient presents with hemorrhagic shock, and anemia.  She will need minimum of 48 hours of serial H&H with possibly further transfusion and interventions depending on how she does.  She is being admitted to the stepdown ICU.    Patient will need a Intermediate Care (Adult and Pediatrics) bed on MEDICAL service .  The need is secondary to upper GI bleed.    * GI bleed- (present on admission)  Assessment & Plan  Patient conveniently had an EGD on November 22 of this year showing esophagitis and multiple gastric and duodenal ulcers  GI consulted  Patient does not have ascites nor does she have a history of varices or cirrhosis therefore octreotide not indicated nor is SBP prophylaxis indicated  IV Protonix  Sucralfate  Serial H&H  Monitored bed  Maintain good peripheral IV access and access her port  Given elevation of transaminases checking a stat TEG for coagulopathy  GI consulted  N.p.o.  Patient reports to me she is not taking any over-the-counter medications, and denies any active drinking    Hemorrhagic shock (HCC)  Assessment & Plan  Patient has thus far responded to initial resuscitation events with IV crystalloid and PRBCs  Admit to monitored bed  Maintained large-bore peripheral IV access  Serial H&H  Transfuse as appropriate    Anemia associated with acute blood loss- (present on admission)  Assessment & Plan  Secondary to GI bleed  Getting 2 units of PRBCs here in the ED  Admit to IMCU  Follow serial H&H  Transfuse to goal greater than 7 or more aggressively should she become hypotensive again    Malignant neoplasm of right female breast (HCC)  Assessment & Plan  Patient is s/p initial chemotherapy and radiation therapy  She has known mets to the lung  She is currently on Keytruda infusions every 6 weeks and followed by Dr. Clemens  She has a port    Tobacco use- (present on admission)  Assessment & Plan  Encourage cessation    Lactic acidosis - resolved- (present on  admission)  Assessment & Plan  Significant lactic acidosis in the setting of hemorrhagic shock  Ongoing resuscitation  Repeat lactic acid s/p transfusions  I do not think this represents sepsis    Transaminitis- (present on admission)  Assessment & Plan  In review of records, the patient's LFTs have been trending up since the beginning of November  She has had a CT scan which did demonstrate a possible cyst or mass, and at that time further imaging was recommended with MRI or CT  Hepatitis panel was positive for hep C antibody earlier this month  Given that the transaminases are rising acutely, we will check a dedicated CT of the liver  Concerned that hepatitis C could be playing a role  Patient is also quite hypotensive on presentation, and this could represent an ischemic insult  Continue to trend LFTs    Essential hypertension- (present on admission)  Assessment & Plan  Continue metoprolol and amlodipine with parameters    Hypomagnesemia- (present on admission)  Assessment & Plan  Patient has a history of hypomagnesemia  Check and replace as appropriate    Hypokalemia- (present on admission)  Assessment & Plan  Replace IV  Check magnesium and replace if appropriate  Repeat lab in a.m.    Bipolar 2 disorder (HCC)- (present on admission)  Assessment & Plan  Continue outpatient regimen      VTE prophylaxis: SCDs/TEDs

## 2022-11-29 NOTE — ED PROVIDER NOTES
ED Provider Note    CHIEF COMPLAINT  Chief Complaint   Patient presents with    Abdominal Pain     RUQ pain with nausea, vomiting and black tarry stool for a few days. History of GI bleed.       HPI  Ashleigh Marquis is a 62 y.o. female who presents with a history of duodenal, gastric, esophageal ulcers, she presents stating that she is had right upper quadrant pain with vomiting and diarrhea, black tarry stool and grape colored vomit for 2 days.  She describes all symptoms as severe.  She also describes feeling dizzy.  She reports she quit drinking alcohol 2 years ago.    REVIEW OF SYSTEMS  See HPI for further details. All other systems are negative.     PAST MEDICAL HISTORY   has a past medical history of Alcoholism ([facility]), Anemia, Anxiety, Anxiety and depression (03/23/2021), Arthritis, ASTHMA, Breath shortness, Bronchitis (01/18/2022), Cancer ([facility]) (1981), Chronic low back pain, Congestive heart failure ([facility]), Dental disorder, Depression, EtOH dependence ([facility]), Fall, GERD (gastroesophageal reflux disease), Heart burn, HTN, Hypertension, Indigestion, Muscle disorder, OSTEOPOROSIS, Other specified symptom associated with female genital organs, Pain (03/23/2021), Pneumonia (01/18/2022), Psychiatric disorder, PTSD (post-traumatic stress disorder), Renal disorder, Renal failure (Around 2019), Seizure ([facility]), Seizure ([facility]) (11/03/2020), Ulcer, and Vitamin D deficiency.    SOCIAL HISTORY  Social History     Tobacco Use    Smoking status: Not on file    Smokeless tobacco: Former     Types: Chew    Tobacco comments:     1/2 pack per day   Vaping Use    Vaping Use: Never used   Substance and Sexual Activity    Alcohol use: Not Currently     Comment: vodka, heavy use-pt stated she is not drinking as of 3/23    Drug use: Not Currently     Comment: In the past smoked pot    Sexual activity: Never     Partners: Male       SURGICAL HISTORY   has a past surgical history that includes hernia repair (1977); cysto stent placemnt  pre surg (10/7/2010); cystoscopy stent placement (11/9/2010); ureteroscopy (11/9/2010); lasertripsy (11/9/2010); stent removal (11/9/2010); gastroscopy-endo (N/A, 10/3/2018); mastectomy, partial (Right, 3/26/2021); axillary node dissection (Right, 3/26/2021); other (Left, 2010); upper gi endoscopy,diagnosis (N/A, 10/22/2022); colonoscopy,diagnostic (N/A, 10/22/2022); upper gi endoscopy,diagnosis (N/A, 11/22/2022); and upper gi endoscopy,biopsy (N/A, 11/22/2022).    CURRENT MEDICATIONS  Home Medications       Reviewed by Aretha Gill (Pharmacy Tech) on 11/29/22 at 1248  Med List Status: Complete     Medication Last Dose Status   acetaminophen (TYLENOL) 500 MG Tab UNK Active   amLODIPine (NORVASC) 10 MG Tab UNK Active   carisoprodol (SOMA) 350 MG Tab UNK Active   cholestyramine (QUESTRAN,PREVALITE) 4 GM Pack UNK Active   clonazepam (KLONOPIN) 2 MG tablet UNK Active   diphenoxylate-atropine (LOMOTIL) 2.5-0.025 MG Tab UNK Active   levETIRAcetam (KEPPRA) 500 MG Tab UNK Active   loperamide (IMODIUM) 2 MG Cap UNK Active   metoprolol tartrate (LOPRESSOR) 25 MG Tab UNK Active   multivitamin (THERAGRAN) Tab UNK Active   nystatin (MYCOSTATIN) powder UNK Active   omeprazole (PRILOSEC) 40 MG delayed-release capsule UNK Active   ondansetron (ZOFRAN) 4 MG Tab tablet UNK Active   oxyCODONE immediate release (ROXICODONE) 10 MG immediate release tablet UNK Active   predniSONE (DELTASONE) 10 MG Tab UNK Active   prochlorperazine (COMPAZINE) 10 MG Tab UNK Active   VENTOLIN  (90 Base) MCG/ACT Aero Soln inhalation aerosol UNK Active                      ALLERGIES  Allergies   Allergen Reactions    Aspirin Unspecified     Severely sick as a child     Sulfa Drugs Vomiting    Toradol Vomiting       FAMILY HISTORY  No pertinent family history    PHYSICAL EXAM  VITAL SIGNS: BP (!) 95/60   Pulse (!) 108   Temp 35.9 °C (96.7 °F)   Resp 12   Wt 63.5 kg (140 lb)   LMP 11/02/2015   SpO2 100%   BMI 22.61 kg/m²  @[id number]::@    Pulse ox interpretation: I interpret this pulse ox as normal.  Constitutional: Alert.  HENT: No signs of trauma, Bilateral external ears normal, Nose normal.   Eyes: Pupils are equal and reactive, Conjunctiva normal, Non-icteric.   Neck: Normal range of motion, No tenderness, Supple, No stridor.   Lymphatic: No lymphadenopathy noted.   Cardiovascular: Regular rate and rhythm, no murmurs.   Thorax & Lungs: Normal breath sounds, No respiratory distress, No wheezing, No chest tenderness.   Abdomen: Bowel sounds normal, Soft, No tenderness, No masses, No pulsatile masses. No peritoneal signs.  Skin: Warm, Dry, No erythema, No rash.   Back: No bony tenderness, No CVA tenderness.   Extremities: Intact distal pulses, No edema, No tenderness, No cyanosis.  Musculoskeletal: Good range of motion in all major joints. No tenderness to palpation or major deformities noted.   Neurologic: Alert , Normal motor function, Normal sensory function, No focal deficits noted.   Psychiatric: Affect normal, Judgment normal, Mood normal.       DIAGNOSTIC STUDIES / PROCEDURES      LABS  Labs Reviewed   LACTIC ACID - Abnormal; Notable for the following components:       Result Value    Lactic Acid 7.0 (*)     All other components within normal limits   CBC WITH DIFFERENTIAL - Abnormal; Notable for the following components:    WBC 13.9 (*)     RBC 1.63 (*)     Hemoglobin 5.5 (*)     Hematocrit 18.0 (*)     .4 (*)     MCH 33.7 (*)     MCHC 30.6 (*)     RDW 80.5 (*)     Platelet Count 153 (*)     Neutrophils-Polys 87.00 (*)     Lymphocytes 6.00 (*)     Neutrophils (Absolute) 12.23 (*)     Lymphs (Absolute) 0.83 (*)     Anisocytosis 2+ (*)     Macrocytosis 2+ (*)     All other components within normal limits   COMP METABOLIC PANEL - Abnormal; Notable for the following components:    Potassium 3.0 (*)     Glucose 137 (*)     Bun 27 (*)     Calcium 8.2 (*)     AST(SGOT) 443 (*)     ALT(SGPT) 735 (*)     Alkaline Phosphatase 346 (*)      "Albumin 3.0 (*)     Total Protein 4.6 (*)     Globulin 1.6 (*)     All other components within normal limits   PROTHROMBIN TIME - Abnormal; Notable for the following components:    PT 15.1 (*)     INR 1.20 (*)     All other components within normal limits    Narrative:     Indicate which anticoagulants the patient is on:->UNKNOWN   APTT - Abnormal; Notable for the following components:    APTT 23.2 (*)     All other components within normal limits    Narrative:     Indicate which anticoagulants the patient is on:->UNKNOWN   COD (ADULT)   LIPASE    Narrative:     Indicate which anticoagulants the patient is on:->UNKNOWN   DIAGNOSTIC ALCOHOL    Narrative:     Indicate which anticoagulants the patient is on:->UNKNOWN   ESTIMATED GFR   DIFFERENTIAL MANUAL   PERIPHERAL SMEAR REVIEW   PLATELET ESTIMATE   MORPHOLOGY   LACTIC ACID   LACTIC ACID   BLOOD CULTURE    Narrative:     Per Hospital Policy: Only change Specimen Src: to \"Line\" if  specified by physician order.   BLOOD CULTURE    Narrative:     Per Hospital Policy: Only change Specimen Src: to \"Line\" if  specified by physician order.   PLATELET MAPPING WITH BASIC TEG   HGB   RELEASE RED BLOOD CELLS-ACTIVE BLEEDING   TRANSFUSE RBC ACTIVE BLEED-NURSING COMMUNICATION         RADIOLOGY  DX-CHEST-PORTABLE (1 VIEW)   Final Result      1.  No acute cardiac or pulmonary abnormalities are identified.      CTA ABDOMEN PELVIS W & W/O POST PROCESS    (Results Pending)           COURSE & MEDICAL DECISION MAKING  Pertinent Labs & Imaging studies reviewed. (See chart for details)    I reviewed the patient's previous records, on November 22, 2022, 7 days ago, the patient had EGD performed by Dr. Sarthak Miller, at that time she was diagnosed with esophageal ulcer, gastric ulcer, duodenal ulcer.    The patient presents with vomiting grape colored vomit and diarrhea.  Differential diagnose includes GI bleed, dehydration, gastroenteritis    We were called by the lab at 12:45 that the " patient has a hemoglobin of 5.  I have ordered 2 units packed red blood cells, I have consented the patient the risks and benefits of blood transfusion.      I discussed case with Dr. Sandoval the intensivist who will assess the patient for MICU versus ICU admission.    I spoke with Dr.Tarek Valdez on for [facility], he will assess the patient for hospitalization.  He agrees at this time to not give octreotide and Protonix based on the patient's endoscopy from 7 days ago.    3:19 PM The patient was assessed by Dr. Jim Sandoval from ICU, he and I agree ICU stepdown is appropriate for the patient.  I have paged the [facility] hospitalist.  Patient's blood pressure has improved to over 100 systolic after fluid and packed red blood cell resuscitation.    I spoke with the hospitalist Dr. Aristeo CHAVEZ  Who will assess the patient for hospitalization.  The patient is in guarded condition at this time.      The total critical care time on this patient is 40 minutes, resuscitating patient, speaking with admitting physician, and deciphering test results. This 40 minutes is exclusive of separately billable procedures.        FINAL IMPRESSION  1. Gastrointestinal hemorrhage associated with gastrojejunal ulcer        2. Anemia, unspecified type        3. Hypotension, unspecified hypotension type        4. Elevated lactic acid level          The total critical care time is 40 minutes as outlined above    Electronically signed by: Jake Mondragon M.D., 11/29/2022 12:25 PM

## 2022-11-29 NOTE — ED NOTES
Michelle  from Lab called with critical result of Hgb 5.5 mg/dL at 12:38 PM. Critical lab result read back to Michelle.   Dr. Mondragon notified of critical lab result at 12:42.  Critical lab result read back by Dr. Mondragon. Verbalized he will order blood transfusion.

## 2022-11-29 NOTE — ASSESSMENT & PLAN NOTE
Secondary to GI bleed  Getting 2 units of PRBCs in the ED  Daily CBC  Transfuse to goal greater than 7 or more aggressively should she become hypotensive again  S/P EGD with ulcers and gastritis/esophagitis noted.    Comfort care

## 2022-11-29 NOTE — ASSESSMENT & PLAN NOTE
Continue active treatment with amldopine 10mg daily and metoprolol 25mg BID  Monitoring vitals.    Comfort care

## 2022-11-29 NOTE — ED NOTES
Med rec completed per pt and historically from recent med rec   Allergies reviewed    Pt states that she hasn't taken her medications in a few days

## 2022-11-29 NOTE — ED NOTES
Lab called with critical result of lactic 7.0 at 1322. Critical lab result read back to lab.   Dr. Mondragon notified of critical lab result at 1324.  Critical lab result read back by Dr. Mondragon.

## 2022-11-29 NOTE — ASSESSMENT & PLAN NOTE
Significant lactic acidosis in the setting of hemorrhagic shock  Ongoing resuscitation  Resolved    Comfort care

## 2022-11-29 NOTE — CONSULTS
.Date of Consultation:  11/29/2022    Patient: : Ashleigh Marquis  MRN: [id number]    Referring Physician:   Dr Mondragon    GI:Chris Valdez M.D.     Reason for Consultation:   GI bleed  anemia    History of Present Illness:   61 y/o female with past medical history of breast cancer metastatic to the lung, chronic pain, alcohol abuse, acute pancreatitis, HCV, asthma and cervical cancer presented to the hospital with SOB. The patient also reports vomiting blood. She vomited once last night and once this morning. She reports vomiting dark red blood. She also reports having dark tarry stools.  The patient was found to be anemic with a hemoglobin of 5.5. Her transaminases are elevated with ,  and . Normal TB at 0.5. She has a positive HCV antibody.    Of note, the patient was recently discharged from the [facility]. She was evaluated by Dr Miller (GI) and underwent an EGD for GI bleeding. She was found to have esophagitis with ulcerations, multiple gastric and duodenal ulcers.  She is established with Dr Amador (GI) as outpatient and was found to have lymphocytic colitis likely secondary to Keytruda. He did perform an EGD and colonoscopy 10/22/2022. EGD and colonoscopy were grossly normal but biopsies confirmed lymphocytic colitis.        Aspirin, Sulfa drugs, and Toradol       Past Medical History:   Diagnosis Date    Bronchitis 01/18/2022    In the past    Pneumonia 01/18/2022    In the past    Anxiety and depression 03/23/2021    Pain 03/23/2021    breast, legs, joints    Seizure (HCC) 11/03/2020    last seizure was 11/2020    Cancer ([facility]) 1981    cervical    Alcoholism (HCC)     Anemia     pt states she doesn't think it is a current issue    Anxiety     Arthritis     osteo-legs, knees    ASTHMA     Inhaler as needed.     Breath shortness     On exertion.     Chronic low back pain     Congestive heart failure (HCC)     1/18/22:  pt states she is not sure.     Dental disorder     upper and lower dentures.  "1/18/22: Pt. denies    Depression     EtOH dependence (HCC)     Fall     alcohol related    GERD (gastroesophageal reflux disease)     Heart burn     HTN     Hypertension     Indigestion     GERD    Muscle disorder     OSTEOPOROSIS     Other specified symptom associated with female genital organs     \"I have fibroids bad\"\"    Psychiatric disorder     PTSD (post-traumatic stress disorder)     Renal disorder     Hx of stones    Renal failure Around 2019    One time related to heeavily drinking per pt.     Seizure (HCC)     several years ago r/t alcohol withdrawl    Ulcer     Vitamin D deficiency          Past Surgical History:   Procedure Laterality Date    WI UPPER GI ENDOSCOPY,DIAGNOSIS N/A 11/22/2022    Procedure: GASTROSCOPY;  Surgeon: Tita Miller M.D.;  Location: SURGERY SAME DAY [facility];  Service: Gastroenterology    WI UPPER GI ENDOSCOPY,BIOPSY N/A 11/22/2022    Procedure: GASTROSCOPY, WITH BIOPSY;  Surgeon: Tita Miller M.D.;  Location: SURGERY SAME DAY [facility];  Service: Gastroenterology    WI UPPER GI ENDOSCOPY,DIAGNOSIS N/A 10/22/2022    Procedure: ESOPHAGOGASTRODUODENOSCOPY with biopsy;  Surgeon: Zachary Pate M.D.;  Location: SURGERY [facility];  Service: Gastroenterology    WI COLONOSCOPY,DIAGNOSTIC N/A 10/22/2022    Procedure: COLONOSCOPY with biopsy;  Surgeon: Zachary Pate M.D.;  Location: SURGERY [facility];  Service: Gastroenterology    PB MASTECTOMY, PARTIAL Right 3/26/2021    Procedure: MASTECTOMY, PARTIAL - ESTEBAN LOCALIZED;  Surgeon: Janice Knowles M.D.;  Location: SURGERY SAME DAY [facility];  Service: General    AXILLARY NODE DISSECTION Right 3/26/2021    Procedure: LYMPHADENECTOMY, AXILLARY.;  Surgeon: Janice Knowles M.D.;  Location: SURGERY SAME DAY [facility];  Service: General    GASTROSCOPY-ENDO N/A 10/3/2018    Procedure: GASTROSCOPY-ENDO;  Surgeon: Ben Negro M.D.;  Location: [place]OSCOPY [facility]  Service: Gastroenterology    CYSTOSCOPY STENT " PLACEMENT  11/9/2010    Performed by ANGEL HARRIS at SURGERY [facility] ORS    URETEROSCOPY  11/9/2010    Performed by ANGEL HARRIS at SURGERY [facility] ORS    LASERTRIPSY  11/9/2010    Performed by ANGEL HARRIS at SURGERY [facility] ORS    STENT REMOVAL  11/9/2010    Performed by ANGEL HARRIS at SURGERY [facility] ORS    CYSTO STENT PLACEMNT PRE SURG  10/7/2010    Performed by ANGEL HARRIS at SURGERY [facility]    OTHER Left 2010    Leg    HERNIA REPAIR  1977         Family History   Problem Relation Age of Onset    Heart Disease Father     Hypertension Father     Diabetes Father     Cancer Paternal Grandmother     Depression Other     Lung Disease Neg Hx     Stroke Neg Hx          Social History     Socioeconomic History    Marital status:    Tobacco Use    Smokeless tobacco: Former     Types: Chew    Tobacco comments:     1/2 pack per day   Vaping Use    Vaping Use: Never used   Substance and Sexual Activity    Alcohol use: Not Currently     Comment: vodka, heavy use-pt stated she is not drinking as of 3/23    Drug use: Not Currently     Comment: In the past smoked pot    Sexual activity: Never     Partners: Male   Social History Narrative    ** Merged History Encounter **         ** Merged History Encounter **              Review of Systems   Constitutional:  Positive for malaise/fatigue.   HENT: Negative.     Eyes: Negative.    Respiratory:  Positive for shortness of breath.    Cardiovascular: Negative.    Gastrointestinal:  Positive for abdominal pain.   Genitourinary: Negative.    Musculoskeletal: Negative.    Skin: Negative.    Neurological: Negative.    Psychiatric/Behavioral: Negative.         Physical Exam:  Vitals:    11/29/22 1415 11/29/22 1430 11/29/22 1435 11/29/22 1438   BP: 102/58 108/62     Pulse: (!) 107 (!) 107     Resp: 13 12     Temp:       TempSrc:       SpO2: 94% 95% 88% 93%       Physical Exam  Constitutional:       Appearance: She is ill-appearing.   HENT:       Head: Normocephalic and atraumatic.   Eyes:      Extraocular Movements: Extraocular movements intact.      Pupils: Pupils are equal, round, and reactive to light.   Cardiovascular:      Rate and Rhythm: Regular rhythm.   Pulmonary:      Effort: Pulmonary effort is normal.      Breath sounds: Normal breath sounds.   Abdominal:      General: Abdomen is flat. Bowel sounds are normal.      Palpations: Abdomen is soft.   Musculoskeletal:         General: Normal range of motion.      Cervical back: Normal range of motion and neck supple.   Neurological:      General: No focal deficit present.      Mental Status: She is alert and oriented to person, place, and time.   Psychiatric:         Mood and Affect: Mood normal.         Behavior: Behavior normal.         Labs:  Recent Labs     11/29/22  1207   WBC 13.9*   RBC 1.63*   HEMOGLOBIN 5.5*   HEMATOCRIT 18.0*   .4*   MCH 33.7*   MCHC 30.6*   RDW 80.5*   PLATELETCT 153*   MPV 11.8     Recent Labs     11/29/22  1207   SODIUM 139   POTASSIUM 3.0*   CHLORIDE 99   CO2 24   GLUCOSE 137*   BUN 27*     Recent Labs     11/29/22  1207   APTT 23.2*   INR 1.20*       Recent Labs     11/29/22  1207   ASTSGOT 443*   ALTSGPT 735*   TBILIRUBIN 0.5   ALKPHOSPHAT 346*   GLOBULIN 1.6*   INR 1.20*         Imaging:  DX-CHEST-PORTABLE (1 VIEW)  Narrative: 11/29/2022 12:25 PM    HISTORY/REASON FOR EXAM:  possible sepsis.    TECHNIQUE/EXAM DESCRIPTION AND NUMBER OF VIEWS:  Single portable view of the chest.    COMPARISON: 11/10/2022    FINDINGS:    Right chest Port-A-Cath with tip projecting at the inferior SVC.    HEART: Not enlarged.  LUNGS: No areas of air space disease are demonstrated.  PLEURA: No effusion or pneumothorax.  Impression: 1.  No acute cardiac or pulmonary abnormalities are identified.            Impressions:  Hematemesis and melena. Patient is known to have esophagitis, gastric and duodenal ulcers on recent EGD 11/22/22 with Dr Miller.  Post hemorrhagic anemia.  Abnormal  LFTs  Hepatitis C antibody positive      Recommendations:  Keep NPO except meds.  Transfuse to keep Hb>7.  Start Pantoprazole 40mg IV bid. No need to start Octreotide as no varices noted on recent EGD.  Plan for EGD tomorrow. Patient consents.  Obtain RUQ US to evaluate her liver.  Obtain HCV RNA PCR to confirm HCV infection.      This note was generated using voice recognition software which has a small chance of producing errors of grammar and possibly content. I have made every reasonable attempt to find and correct any obvious errors, but expect that some may not be found prior to finalization of this note.

## 2022-11-29 NOTE — ED TRIAGE NOTES
Chief Complaint   Patient presents with    Abdominal Pain     RUQ pain with nausea, vomiting and black tarry stool for a few days. History of GI bleed.     BIB for complain above.    BP (!) 81/50   Pulse (!) 118   Temp 36.1 °C (97 °F) (Temporal)   Resp (!) 26   LMP 11/02/2015   SpO2 98%

## 2022-11-29 NOTE — ASSESSMENT & PLAN NOTE
12/1 K:2.5  Replace IV and oral  Check magnesium and replace if appropriate  Repeat lab in a.m.    Comfort care

## 2022-11-29 NOTE — ED NOTES
Break RN: Blood transfusing. Patient tolerating well, free of symptoms. Placed on 2L O2 as patient noted to be 88% on RA while asleep. Patient denies SOB or chest pain

## 2022-11-29 NOTE — ASSESSMENT & PLAN NOTE
Hepatitis panel positive for hepatitis C antibody earlier this month.  Hepatitis C RNA negative on 11/29.   CT liver shows low density lesion in the dome of the liver, right hepatic subcentimeter lesions, small hiatal hernia.  Probably benign by LI-RADS.  Continue to trend LFTs    Comfort care

## 2022-11-29 NOTE — ASSESSMENT & PLAN NOTE
11/22/22 EGD: esophagitis and multiple gastric and duodenal ulcers  11/30/22 EGD:   POSTOPERATIVE DIAGNOSIS:   1. LA grade D esophagitis.  2. 4cm hiatal hernia.  3. Gastritis.  4. Clean based pyloric channel ulcer.  5. Multiple large clean based duodenal ulcers.  No active bleeding.  GI consulting  Oral PPI BID  Sucralfate  Monitor CBC  Maintain good peripheral IV access and access her port  Advance diet  Discussed avoidance of NSAIDs, ASA, acidic foods/beverages, temperature hot foods and alcohol.    Comfort care

## 2022-11-29 NOTE — ASSESSMENT & PLAN NOTE
S/p IV fluid resuscitation and PRBCs  11/30 had EGD w/o sign of active bleed but did have duodenal ulcer gastritis and esophagitis.  Maintained large-bore peripheral IV access  Daily CBC  Transfuse as appropriate  12/1 Hgb:8.7    Comfort care

## 2022-11-30 PROBLEM — K92.2 UPPER GI BLEED: Status: ACTIVE | Noted: 2022-01-01

## 2022-11-30 NOTE — CARE PLAN
The patient is Watcher - Medium risk of patient condition declining or worsening    Shift Goals  Clinical Goals: patient to remain hemodynamically stable  Patient Goals: comfort  Family Goals: comfort    Progress made toward(s) clinical / shift goals:    Problem: Pain - Standard  Goal: Alleviation of pain or a reduction in pain to the patient’s comfort goal  Outcome: Progressing     Problem: Knowledge Deficit - Standard  Goal: Patient and family/care givers will demonstrate understanding of plan of care, disease process/condition, diagnostic tests and medications  Outcome: Progressing     Problem: Skin Integrity  Goal: Skin integrity is maintained or improved  Outcome: Progressing       Patient is not progressing towards the following goals:

## 2022-11-30 NOTE — ED NOTES
PIV occluded, new access obtained by Cele IRENE using ultrasound guidance.     Medicated patient per MAR. Patient denies further needs. Call light within reach.

## 2022-11-30 NOTE — ASSESSMENT & PLAN NOTE
Patient is s/p initial chemotherapy and radiation therapy  She has known mets to the lung  Prior MRI brain suspicious of brain mets  She is currently on Keytruda infusions every 6 weeks and followed by Dr. Clemens  She has a [facility]

## 2022-11-30 NOTE — PROGRESS NOTES
Hospital Medicine Daily Progress Note    Date of Service  11/30/2022    Chief Complaint  Tarry stool and weak    Hospital Course  Ashleigh Marquis is a 62 y.o. female who presented 11/29/2022 with previous medical history of includes alcohol abuse, breast cancer with known mets to the lung,, she reports that she is not drinking actively at this point, depression, bipolar disorder, chronic pain, hypertension, recent subarachnoid hemorrhage, and recent GI bleed.       Patient was just in the hospital from November 3 to 24.  She was found down out in community, and was brought in here hypothermic with a temperature 93 degrees core.  Imaging showed frontal subarachnoid hemorrhage, for which the patient was treated nonoperatively.  She also had an MSSA pneumonia for which she was treated with antibiotics.  During her hospital stay she had a seizure, and was placed on Keppra as a result.  Significantly her hospital stay was also complicated by upper GI bleed for which she underwent endoscopy on November 22.  She was found to have esophagitis with multiple gastric and duodenal ulcers.  She did not have any esophageal varices at that time.     Patient presents back today with complaint of upper GI pain, and bloody emesis.  Symptoms began this morning.  She is also had diarrhea, however this is chronic for her.  She does note some black bowel movements earlier today.  Here in the emergency room the, the patient was initially hypotensive with systolic in the 70s, and had a lactic acid of 7.  She has since been transfused of 2 units of PRBCs, and given fluids.  On my examination she has systolic in the 90s, with a heart rate in the 80s.  GI has been consulted.    Interval Problem Update  11/30: Refusing meds per RN.  Hgb:9.7 <8<6.3. K:3.2. Mg:3.4 <0.9 Elevated LFTs. Patient abrupt and rude upon my evaluation after I politely introduced myself.  Patient wanted/demanded juice but I alerted her that she has an endoscopy  pending.  Per reports the patient went down had an endoscopy with no acute signs of bleeding.  She has ongoing duodenal ulcer some gastritis esophagitis.    I have discussed this patient's plan of care and discharge plan at IDT rounds today with Case Management, Nursing, Nursing leadership, and other members of the IDT team.    Consultants/Specialty  GI    Code Status  Full Code    Disposition  Patient is not medically cleared for discharge.   Anticipate discharge to  be determined .  I have placed the appropriate orders for post-discharge needs.    Review of Systems  Review of Systems   Unable to perform ROS: Psychiatric disorder      Physical Exam  Temp:  [35.6 °C (96 °F)-36.8 °C (98.2 °F)] 36.3 °C (97.4 °F)  Pulse:  [] 85  Resp:  [10-24] 16  BP: ()/(51-75) 134/57  SpO2:  [81 %-100 %] 94 %    Physical Exam  Vitals reviewed.   Constitutional:       Appearance: Normal appearance. She is not ill-appearing or diaphoretic.   HENT:      Head: Normocephalic and atraumatic.      Nose: Nose normal.      Mouth/Throat:      Mouth: Mucous membranes are moist.      Pharynx: No oropharyngeal exudate.   Eyes:      General: No scleral icterus.        Right eye: No discharge.         Left eye: No discharge.      Extraocular Movements: Extraocular movements intact.      Conjunctiva/sclera: Conjunctivae normal.   Cardiovascular:      Rate and Rhythm: Normal rate and regular rhythm.      Pulses:           Radial pulses are 2+ on the right side and 2+ on the left side.        Dorsalis pedis pulses are 2+ on the right side and 2+ on the left side.      Heart sounds: No murmur heard.  Pulmonary:      Effort: Pulmonary effort is normal. No respiratory distress.      Breath sounds: Normal breath sounds. No wheezing or rales.   Abdominal:      General: Bowel sounds are normal. There is no distension.      Palpations: Abdomen is soft.      Tenderness: There is no abdominal tenderness.   Musculoskeletal:         General: No  swelling or tenderness.      Cervical back: No tenderness. No muscular tenderness.      Right lower leg: No edema.      Left lower leg: No edema.   Lymphadenopathy:      Cervical: No cervical adenopathy.   Skin:     Coloration: Skin is not jaundiced or pale.   Neurological:      General: No focal deficit present.      Mental Status: She is alert and oriented to person, place, and time.      Cranial Nerves: No cranial nerve deficit.   Psychiatric:         Attention and Perception: She is inattentive.         Mood and Affect: Affect is angry and inappropriate.         Speech: Speech normal.         Behavior: Behavior is uncooperative.       Fluids    Intake/Output Summary (Last 24 hours) at 11/30/2022 1205  Last data filed at 11/30/2022 1006  Gross per 24 hour   Intake 1531.16 ml   Output 0 ml   Net 1531.16 ml       Laboratory  Recent Labs     11/29/22  1207 11/29/22  1705 11/29/22  2309 11/30/22  0440 11/30/22  1110   WBC 13.9*  --   --  10.6  --    RBC 1.63*  --   --  3.16*  --    HEMOGLOBIN 5.5*   < > 8.0* 9.7* 8.9*   HEMATOCRIT 18.0*  --   --  29.8*  --    .4*  --   --  94.3  --    MCH 33.7*  --   --  30.7  --    MCHC 30.6*  --   --  32.6*  --    RDW 80.5*  --   --  53.7*  --    PLATELETCT 153*  --   --  97*  --    MPV 11.8  --   --  11.4  --     < > = values in this interval not displayed.     Recent Labs     11/29/22  1207 11/30/22  0440   SODIUM 139 137   POTASSIUM 3.0* 3.2*   CHLORIDE 99 102   CO2 24 26   GLUCOSE 137* 65   BUN 27* 20   CREATININE 0.83 0.57   CALCIUM 8.2* 8.2*     Recent Labs     11/29/22  1207   APTT 23.2*   INR 1.20*               Imaging  CT-ABDOMEN LIVER FOR HEPATIC MASS (CIRRHOSIS)   Final Result         1.  Low-density lesion in the dome of the liver which is partially filled in an isodense on delayed imaging. Additional low-density right hepatic subcentimeter lesions are seen which are nonenhancing and isodense on delayed phase imaging.   2.  Small hiatal hernia      LI-RADS:  LR-2: probably benign      DX-CHEST-PORTABLE (1 VIEW)   Final Result      1.  No acute cardiac or pulmonary abnormalities are identified.           Assessment/Plan  * GI bleed- (present on admission)  Assessment & Plan  11/22/22 EGD: esophagitis and multiple gastric and duodenal ulcers  11/30/22 EGD:  GI consulting  Patient does not have ascites nor does she have a history of varices or cirrhosis therefore octreotide not indicated nor is SBP prophylaxis indicated  Change to oral PPI BID  Sucralfate  Monitor CBC  Maintain good peripheral IV access and access her port  Given elevation of transaminases checking a stat TEG for coagulopathy  Liquid diet    Hemorrhagic shock (HCC)  Assessment & Plan  S/p IV fluid resuscitation and PRBCs  11/30 had EGD w/o sign of active bleed but did have duodenal ulcer gastritis and esophagitis.  Maintained large-bore peripheral IV access  Serial H&H  Transfuse as appropriate    Anemia associated with acute blood loss- (present on admission)  Assessment & Plan  Secondary to GI bleed  Getting 2 units of PRBCs here in the ED  Admit to IMCU  Follow serial H&H  Transfuse to goal greater than 7 or more aggressively should she become hypotensive again    Malignant neoplasm of right female breast (HCC)  Assessment & Plan  Patient is s/p initial chemotherapy and radiation therapy  She has known mets to the lung  She is currently on Keytruda infusions every 6 weeks and followed by Dr. Clemens  She has a port    Tobacco use- (present on admission)  Assessment & Plan  Encourage cessation    Lactic acidosis - resolved- (present on admission)  Assessment & Plan  Significant lactic acidosis in the setting of hemorrhagic shock  Ongoing resuscitation  Repeat lactic acid s/p transfusions    Transaminitis- (present on admission)  Assessment & Plan  In review of records, the patient's LFTs have been trending up since the beginning of November  She has had a CT scan which did demonstrate a possible cyst or  mass, and at that time further imaging was recommended with MRI or CT  Hepatitis panel was positive for hep C antibody earlier this month  Given that the transaminases are rising acutely, we will check a dedicated CT of the liver  Concerned that hepatitis C could be playing a role  Patient is also quite hypotensive on presentation, and this could represent an ischemic insult  Continue to trend LFTs    Essential hypertension- (present on admission)  Assessment & Plan  Continue metoprolol and amlodipine with parameters    Hypomagnesemia- (present on admission)  Assessment & Plan  Patient has a history of hypomagnesemia  Check and replace as appropriate    Hypokalemia- (present on admission)  Assessment & Plan  Replace IV  Check magnesium and replace if appropriate  Repeat lab in a.m.    Bipolar 2 disorder (HCC)- (present on admission)  Assessment & Plan  Continue outpatient regimen       VTE prophylaxis: SCDs/TEDs    I have performed a physical exam and reviewed and updated ROS and Plan today (11/30/2022). In review of yesterday's note (11/29/2022), there are no changes except as documented above.

## 2022-11-30 NOTE — ED NOTES
Maria Fernanda from Lab called with critical result of *magnesium 0.9 at 1719. Critical lab result read back to Maria Fernanda.   Dr. Vallejo notified of critical lab result at 1721.  Critical lab result read back by Dr. Vallejo and will add orders.

## 2022-11-30 NOTE — ED NOTES
Patient transported with ACLS RN on Zoll monitor. All patient belongings and chart sent with patient. Patient care transferred. Receiving RN assuming care. Ongoing blood transfusion and LR continue to floor. Magnesium IV to start - medication handed over to transport RN.

## 2022-11-30 NOTE — OR NURSING
1005 from OR to PACU 10. Connected to monitor. Report received from anesthesia & RN. VSS. 02 4L via mask. Breaths calm, even, unlabored.       1012 Report to Marcela IRENE on IMCU. All questions answered, verbalizes understanding. 02 weaned to 2L.     1020 02 weaned to room air. Pt had medium stool, loose and tarry. Tanya care performed and linens changed.     1030 Pt transported to T631 via gurney with transport monitor on & portable 02 available. Upon arrival to room, T6 RN assisted pt to slide to bed.

## 2022-11-30 NOTE — CARE PLAN
The patient is Watcher - Medium risk of patient condition declining or worsening    Shift Goals  Clinical Goals: patient to remain hemodynamically stable  Patient Goals: comfort  Family Goals: comfort    Progress made toward(s) clinical / shift goals:      Problem: Pain - Standard  Goal: Alleviation of pain or a reduction in pain to the patient’s comfort goal  Outcome: Progressing  Flowsheets (Taken 11/29/2022 2000)  Pain Rating Scale (NPRS): 0  Note: Patient reporting no pain     Problem: Knowledge Deficit - Standard  Goal: Patient and family/care givers will demonstrate understanding of plan of care, disease process/condition, diagnostic tests and medications  Outcome: Progressing  Note: Patient verbalizes understanding of education, teach back reinforcement      Problem: Skin Integrity  Goal: Skin integrity is maintained or improved  Outcome: Progressing  Note: Remains free of injury       Patient is not progressing towards the following goals:

## 2022-11-30 NOTE — OR NURSING
Pt irritable upon arrival, not answering pre op questions.  Pt tells me she just wants to sleep and that she has a headache.  Comfort and support provided.

## 2022-11-30 NOTE — PROCEDURES
.OPERATIVE REPORT    PATIENT:   Ashleigh Marquis   1960       PREOPERATIVE DIAGNOSES/INDICATIONS:   Hematemesis  Melena  Anemia    POSTOPERATIVE DIAGNOSIS:   LA grade D esophagitis.  4cm hiatal hernia.  Gastritis.  Clean based pyloric channel ulcer.  Multiple large clean based duodenal ulcers.  No active bleeding.    PROCEDURE:  ESOPHAGOGASTRODUODENOSCOPY    PHYSICIAN:  Chris Valdez MD    ANESTHESIA:  Per anesthesiologist.    LOCATION: [facility]    CONSENT:  OBTAINED. The risks, benefits and alternatives of the procedure were discussed in details. The risks include and are not limited to bleeding, infection, perforation, missed lesions, and sedations risks (cardiopulmonary compromise and allergic reaction to medications).    DESCRIPTION: The patient presented to the procedure room.  After routine checkup was performed, patient was brought into the endoscopy suite.  Patient was placed on his left lateral decubitus position. A bite block was placed in patient's mouth. Patient was sedated by anesthesia.  Vital signs were monitored throughout the procedure.  Oxygenation support was provided throughout the procedure. Upper endoscope was inserted into patient's mouth and advanced to the second portion of the duodenum under direct visualization.  Once the site was reached and examined, the upper endoscope was withdrawn.  Retroflexion was made within the stomach.  The stomach was decompressed, scope was withdrawn and the procedure was terminated.    The patient tolerated the procedure well.  There were no immediate complications.    OPERATIVE FINDINGS:    LA grade D esophagitis.  4cm hiatal hernia.  Gastritis.  Clean based pyloric channel ulcer.  Multiple large clean based duodenal ulcers.  No active bleeding.    RECOMMENDATIONS:  Bleeding likely secondary to duodenal ulcers.  Treat with Pantoprazole 40mg po bid.  Start Sucralfate 1gm po Qid.  Monitor H/H and transfuse as needed.  Avoid NSAIDs.    This note has been  transcribed with digital voice recognition software and although it has been reviewed may contain grammatical or word errors

## 2022-11-30 NOTE — ANESTHESIA PREPROCEDURE EVALUATION
Case: [id number] Date/Time: 11/30/22 0915    Procedure: GASTROSCOPY (Esophagus)    Anesthesia type: MAC    Location: [facility] ROOM 26 / SURGERY SAME DAY [facility]    Surgeons: Chris Valdez M.D.        63 yo w/anemia, melena and hematemesis    Relevant Problems   ANESTHESIA (within normal limits)      PULMONARY   (positive) Asthma      NEURO   (positive) Seizure (HCC)      CARDIAC   (positive) Essential hypertension   (positive) Primary hypertension      GI   (positive) GERD (gastroesophageal reflux disease)         (positive) AYDEE (acute kidney injury) (HCC)   (positive) Acute renal failure with tubular necrosis (HCC)   (positive) Hepatitis C      Other   (positive) Anemia associated with acute blood loss   (positive) Arthritis   (positive) Bipolar 2 disorder (HCC)   (positive) GI bleed   (positive) Hemorrhagic shock (HCC)   (positive) Malignant neoplasm of right female breast (HCC)   (positive) Transaminitis     Hx etoh abuse- denies currently  Hx tob abuse- denies currently    Physical Exam    Airway   Mallampati: II  TM distance: >3 FB  Neck ROM: full       Cardiovascular   Rhythm: regular  Rate: normal  (-) murmur     Dental    Pulmonary   Breath sounds clear to auscultation     Abdominal    Neurological - normal exam                 Anesthesia Plan    ASA 3   ASA physical status 3 criteria: alcohol and/or substance dependence or abuse    Plan - MAC               Induction: intravenous      Pertinent diagnostic labs and testing reviewed    Informed Consent:    Anesthetic plan and risks discussed with patient.    Use of blood products discussed with: patient whom consented to blood products.

## 2022-11-30 NOTE — ANESTHESIA POSTPROCEDURE EVALUATION
Patient: Ashleigh Marquis    Procedure Summary     Date: 11/30/22 Room / Location: [facility] ROOM 26 / SURGERY SAME DAY [facility]    Anesthesia Start: 0930 Anesthesia Stop: 1007    Procedure: GASTROSCOPY (Esophagus) Diagnosis: (multiple duodenal ulcers, gastritis, hiatal hernia, esophagitis)    Surgeons: Chris Valdez M.D. Responsible Provider: Mary Ellen Philippe M.D.    Anesthesia Type: MAC ASA Status: 3          Final Anesthesia Type: MAC  Last vitals  BP   Blood Pressure: 134/57    Temp   36.3 °C (97.4 °F)    Pulse   85   Resp   16    SpO2   94 %      Anesthesia Post Evaluation    Patient location during evaluation: PACU  Patient participation: complete - patient participated  Level of consciousness: awake  Pain score: 0    Airway patency: patent  Anesthetic complications: no  Cardiovascular status: hemodynamically stable  Respiratory status: acceptable  Hydration status: acceptable    PONV: none          No notable events documented.     Nurse Pain Score: 0 (NPRS)

## 2022-12-01 NOTE — PROGRESS NOTES
"Hospital Medicine Daily Progress Note    Date of Service  12/1/2022    Chief Complaint  Tarry stool and weak    Hospital Course  Ashleigh Marquis is a 62 y.o. female who presented 11/29/2022 with previous medical history of includes alcohol abuse, breast cancer with known mets to the lung stage IV, she reports that she is not drinking actively at this point, depression, bipolar disorder, chronic pain, hypertension, recent subarachnoid hemorrhage, and recent GI bleed.       Patient was just in the hospital from November 3 to 24.  She was found down out in community, and was brought in here hypothermic with a temperature 93 degrees core.  Imaging showed frontal subarachnoid hemorrhage, for which the patient was treated nonoperatively.  She also had an MSSA pneumonia for which she was treated with antibiotics.  During her hospital stay she had a seizure, and was placed on Keppra as a result.  Significantly her hospital stay was also complicated by upper GI bleed for which she underwent endoscopy on November 22.  She was found to have esophagitis with multiple gastric and duodenal ulcers.  She did not have any esophageal varices at that time.     Patient presents back today with complaint of upper GI pain, and bloody emesis.  Symptoms began this morning.  She is also had diarrhea, however this is chronic for her.  She does note some black bowel movements earlier today.  Here in the emergency room the, the patient was initially hypotensive with systolic in the 70s, and had a lactic acid of 7.  She has since been transfused of 2 units of PRBCs, and given fluids.  On my examination she has systolic in the 90s, with a heart rate in the 80s.  GI has been consulted.    Interval Problem Update  12/1/22:  Has transfer orders. Refusing care at times per RN notes.  Patient wanting to leave \"just give me a wheelchair and I will get out of here,\" but she was alerted that she was not ready to discharge as of yet.  I did call her " daughter, Katalina, and gave her updates.    I have discussed this patient's plan of care and discharge plan at IDT rounds today with Case Management, Nursing, Nursing leadership, and other members of the IDT team.    Consultants/Specialty  GI    Code Status  Full Code    Disposition  Patient is not medically cleared for discharge.   Anticipate discharge to  be determined .  I have placed the appropriate orders for post-discharge needs.    Review of Systems  Review of Systems   Unable to perform ROS: Psychiatric disorder   Constitutional:  Positive for malaise/fatigue. Negative for chills and fever.   Eyes:  Negative for discharge.   Respiratory:  Negative for cough, shortness of breath and stridor.    Cardiovascular:  Negative for chest pain, palpitations and leg swelling.   Gastrointestinal:  Positive for diarrhea. Negative for abdominal pain and nausea.   Genitourinary:  Negative for dysuria and hematuria.   Musculoskeletal:  Negative for back pain and joint pain.   Neurological:  Negative for dizziness, sensory change, speech change and headaches.   Psychiatric/Behavioral:  Negative for depression and suicidal ideas. The patient is not nervous/anxious.       Physical Exam  Temp:  [36.1 °C (97 °F)-36.7 °C (98 °F)] 36.7 °C (98 °F)  Pulse:  [] 102  Resp:  [13-21] 18  BP: ()/(52-76) 100/56  SpO2:  [97 %-100 %] 97 %    Physical Exam  Vitals reviewed.   Constitutional:       Appearance: Normal appearance. She is not ill-appearing or diaphoretic.   HENT:      Head: Normocephalic and atraumatic.      Nose: Nose normal.      Mouth/Throat:      Pharynx: No oropharyngeal exudate.   Eyes:      General: No scleral icterus.        Right eye: No discharge.         Left eye: No discharge.      Extraocular Movements: Extraocular movements intact.      Conjunctiva/sclera: Conjunctivae normal.   Cardiovascular:      Rate and Rhythm: Normal rate and regular rhythm.      Pulses:           Radial pulses are 2+ on the  right side and 2+ on the left side.        Dorsalis pedis pulses are 2+ on the right side and 2+ on the left side.      Heart sounds: No murmur heard.  Pulmonary:      Effort: Pulmonary effort is normal. No respiratory distress.      Breath sounds: Normal breath sounds. No wheezing or rales.   Abdominal:      General: Bowel sounds are normal. There is no distension.      Palpations: Abdomen is soft.      Tenderness: There is no abdominal tenderness.   Musculoskeletal:         General: No swelling or tenderness.      Cervical back: Neck supple. No tenderness. No muscular tenderness.      Right lower leg: No edema.      Left lower leg: No edema.   Skin:     Coloration: Skin is not jaundiced or pale.   Neurological:      General: No focal deficit present.      Mental Status: She is alert and oriented to person, place, and time.      Cranial Nerves: No cranial nerve deficit.   Psychiatric:         Attention and Perception: Attention normal.         Mood and Affect: Affect is inappropriate.         Speech: Speech normal.         Behavior: Behavior is agitated. Behavior is cooperative.       Fluids    Intake/Output Summary (Last 24 hours) at 12/1/2022 1440  Last data filed at 12/1/2022 0900  Gross per 24 hour   Intake 480 ml   Output 400 ml   Net 80 ml       Laboratory  Recent Labs     11/29/22  1207 11/29/22  1705 11/30/22  0440 11/30/22  1110 11/30/22  1720 11/30/22  2224 12/01/22  0750   WBC 13.9*  --  10.6  --   --   --  6.7   RBC 1.63*  --  3.16*  --   --   --  2.78*   HEMOGLOBIN 5.5*   < > 9.7*   < > 8.9* 8.4* 8.7*   HEMATOCRIT 18.0*  --  29.8*  --   --   --  26.7*   .4*  --  94.3  --   --   --  96.0   MCH 33.7*  --  30.7  --   --   --  31.3   MCHC 30.6*  --  32.6*  --   --   --  32.6*   RDW 80.5*  --  53.7*  --   --   --  62.2*   PLATELETCT 153*  --  97*  --   --   --  89*   MPV 11.8  --  11.4  --   --   --  11.7    < > = values in this interval not displayed.     Recent Labs     11/29/22  4719  11/30/22  0440 12/01/22  0750   SODIUM 139 137 139   POTASSIUM 3.0* 3.2* 2.5*   CHLORIDE 99 102 104   CO2 24 26 26   GLUCOSE 137* 65 81   BUN 27* 20 14   CREATININE 0.83 0.57 0.43*   CALCIUM 8.2* 8.2* 7.8*     Recent Labs     11/29/22  1207   APTT 23.2*   INR 1.20*               Imaging  CT-ABDOMEN LIVER FOR HEPATIC MASS (CIRRHOSIS)   Final Result   Addendum (preliminary) 1 of 1   The case was reviewed.      At least 2 of the 3 lesions described in the original report are new since    7/11/2022 study and represent new hepatic metastases. The larger    metastasis in the medial hepatic dome measures 1.4 cm.      Findings were discussed with Dr. Cummings on 12/1/2022 2:30 PM.      Final         1.  Low-density lesion in the dome of the liver which is partially filled in an isodense on delayed imaging. Additional low-density right hepatic subcentimeter lesions are seen which are nonenhancing and isodense on delayed phase imaging.   2.  Small hiatal hernia      LI-RADS: LR-2: probably benign      DX-CHEST-PORTABLE (1 VIEW)   Final Result      1.  No acute cardiac or pulmonary abnormalities are identified.           Assessment/Plan  * GI bleed- (present on admission)  Assessment & Plan  11/22/22 EGD: esophagitis and multiple gastric and duodenal ulcers  11/30/22 EGD:   POSTOPERATIVE DIAGNOSIS:   LA grade D esophagitis.  4cm hiatal hernia.  Gastritis.  Clean based pyloric channel ulcer.  Multiple large clean based duodenal ulcers.  No active bleeding.  GI consulting  Oral PPI BID  Sucralfate  Monitor CBC  Maintain good peripheral IV access and access her port  Advance diet  Discussed avoidance of NSAIDs, ASA, acidic foods/beverages, temperature hot foods and alcohol.    Hemorrhagic shock (HCC)  Assessment & Plan  S/p IV fluid resuscitation and PRBCs  11/30 had EGD w/o sign of active bleed but did have duodenal ulcer gastritis and esophagitis.  Maintained large-bore peripheral IV access  Daily CBC  Transfuse as  appropriate  12/1 Hgb:8.7    Anemia associated with acute blood loss- (present on admission)  Assessment & Plan  Secondary to GI bleed  Getting 2 units of PRBCs in the ED  Daily CBC  Transfuse to goal greater than 7 or more aggressively should she become hypotensive again  S/P EGD with ulcers and gastritis/esophagitis noted.    History of Subarachnoid hemorrhage (HCC)- (present on admission)  Assessment & Plan  Avoid NSAIDs, ASA, anticoagulation  Likely secondary to a fall.    Malignant neoplasm of right female breast (HCC)  Assessment & Plan  Patient is s/p initial chemotherapy and radiation therapy  She has known mets to the lung  Prior MRI brain suspicious of brain mets and this admit CT liver concerning for 3 metastatic lesions.  She is currently on Keytruda infusions every 6 weeks and followed by Dr. Clemens  She has a port    Tobacco use- (present on admission)  Assessment & Plan  Encourage cessation    Lactic acidosis - resolved- (present on admission)  Assessment & Plan  Significant lactic acidosis in the setting of hemorrhagic shock  Ongoing resuscitation  Repeat lactic acid s/p transfusions    Transaminitis- (present on admission)  Assessment & Plan  In review of records, the patient's LFTs have been trending up since the beginning of November  CT scan liver c/o metastatic disease with 3 low density spots noted  Check AFP but likely breast metastases  Hepatitis panel was positive for hep C antibody earlier this month  Given that the transaminases are rising acutely, we will check a dedicated CT of the liver  Concerned that hepatitis C could be playing a role  Patient is also quite hypotensive on presentation, and this could represent an ischemic insult  Continue to trend LFTs    Essential hypertension- (present on admission)  Assessment & Plan  Continue active treatment with amldopine 10mg daily and metoprolol 25mg BID  Monitoring vitals.    Hypomagnesemia- (present on admission)  Assessment & Plan  Patient  has a history of hypomagnesemia  Check and replace as appropriate    Hypokalemia- (present on admission)  Assessment & Plan  12/1 K:2.5  Replace IV and oral  Check magnesium and replace if appropriate  Repeat lab in a.m.    Bipolar 2 disorder (HCC)- (present on admission)  Assessment & Plan  Start prozac 20mg daily       VTE prophylaxis: SCDs/TEDs    I have performed a physical exam and reviewed and updated ROS and Plan today (12/1/2022). In review of yesterday's note (11/30/2022), there are no changes except as documented above.

## 2022-12-01 NOTE — PROGRESS NOTES
Bedside report received from night shift RN. Assumed patient care. No signs of distress at this time. Tele monitor on, rhythm and monitor summary verified. Safety precautions in place. Call light and personal belongings within reach. Educated patient to use call light if assistance is needed. Will continue to monitor.

## 2022-12-01 NOTE — CARE PLAN
The patient is Stable - Low risk of patient condition declining or worsening    Shift Goals  Clinical Goals: Monitor Lab  Patient Goals: DC planning  Family Goals: SHAI    Progress made toward(s) clinical / shift goals:    Problem: Pain - Standard  Goal: Alleviation of pain or a reduction in pain to the patient’s comfort goal  Outcome: Progressing     Problem: Knowledge Deficit - Standard  Goal: Patient and family/care givers will demonstrate understanding of plan of care, disease process/condition, diagnostic tests and medications  Outcome: Progressing     Problem: Skin Integrity  Goal: Skin integrity is maintained or improved  Outcome: Progressing     Problem: Fall Risk  Goal: Patient will remain free from falls  Outcome: Progressing       Patient is not progressing towards the following goals:

## 2022-12-01 NOTE — PROGRESS NOTES
Gastroenterology Progress Note               Author:  CACHORRO Bee Date & Time Created: 12/1/2022 9:26 AM       Patient ID:  Name:             Ashleigh Marquis  YOB: 1960  Age:                 62 y.o.  female  MRN:               [id number]        Medical Decision Making, by Problem:  Active Hospital Problems    Diagnosis     Upper GI bleed [K92.2]     GI bleed [K92.2]     Hemorrhagic shock (HCC) [R57.8]     Anemia associated with acute blood loss [D62]     Malignant neoplasm of right female breast (HCC) [C50.911]     Tobacco use [Z72.0]     Lactic acidosis - resolved [E87.20]     Transaminitis [R74.01]     Essential hypertension [I10]     Hypokalemia [E87.6]     Hypomagnesemia [E83.42]     Bipolar 2 disorder (HCC) [F31.81]            Presenting Chief Complaint:  GI bleed  anemia     History of Present Illness:   63 y/o female with past medical history of breast cancer metastatic to the lung, chronic pain, alcohol abuse, acute pancreatitis, HCV, asthma and cervical cancer presented to the hospital with SOB. The patient also reports vomiting blood. She vomited once last night and once this morning. She reports vomiting dark red blood. She also reports having dark tarry stools.  The patient was found to be anemic with a hemoglobin of 5.5. Her transaminases are elevated with ,  and . Normal TB at 0.5. She has a positive HCV antibody.     Of note, the patient was recently discharged from the [facility]. She was evaluated by Dr Miller (GI) and underwent an EGD for GI bleeding. She was found to have esophagitis with ulcerations, multiple gastric and duodenal ulcers.  She is established with Dr Amador (GI) as outpatient and was found to have lymphocytic colitis likely secondary to Keytruda. He did perform an EGD and colonoscopy 10/22/2022. EGD and colonoscopy were grossly normal but biopsies confirmed lymphocytic colitis.         Interval History:  12/1/2022: Post-procedure day #1  s/p EGD. Seen at bedside with Hospitalist.  AAOx4, somnolent mood. H/h stable.  +melanic stool. Denies n/v/d or hematochezia.      Hospital Medications:  Current Facility-Administered Medications   Medication Dose Frequency Provider Last Rate Last Admin    potassium chloride (KCL) ivpb 10 mEq  10 mEq Q HOUR Joseph Cummings D.O.        potassium chloride SA (Kdur) tablet 40 mEq  40 mEq Once Joseph Cummings D.O.        omeprazole (PRILOSEC) capsule 20 mg  20 mg BID MARGE BrownOYasmin   20 mg at 11/30/22 2056    metoprolol tartrate (LOPRESSOR) tablet 25 mg  25 mg BID MARGE PalmerOYasmin   25 mg at 11/30/22 1702    levETIRAcetam (KEPPRA) tablet 500 mg  500 mg BID MARGE PalmerOYasmin   500 mg at 11/30/22 1703    clonazePAM (KLONOPIN) tablet 2 mg  2 mg TID PRN MARGE PalmerOYasmin        carisoprodol (SOMA) tablet 350 mg  350 mg Q6HRS PRN Christiano Bautista D.O.        amLODIPine (NORVASC) tablet 10 mg  10 mg DAILY Christiano Bautista D.O.        senna-docusate (PERICOLACE or SENOKOT S) 8.6-50 MG per tablet 2 Tablet  2 Tablet BID MARLINE Palmer.OYasmin   2 Tablet at 11/30/22 1703    And    polyethylene glycol/lytes (MIRALAX) PACKET 1 Packet  1 Packet QDAY PRN Christiano Bautista D.O.        And    magnesium hydroxide (MILK OF MAGNESIA) suspension 30 mL  30 mL QDAY PRN Christiano Bautista D.O.        And    bisacodyl (DULCOLAX) suppository 10 mg  10 mg QDAY PRN MARGE PalmerOYasmin        Respiratory Therapy Consult   Continuous RT Christiano Bautista D.O.        lactated ringers infusion   Continuous Christiano Bautista D.O.   Stopped at 11/30/22 1006    ondansetron (ZOFRAN) syringe/vial injection 4 mg  4 mg Q4HRS PRN MARGE PalmerOYasmin        ondansetron (ZOFRAN ODT) dispertab 4 mg  4 mg Q4HRS PRN MARGE PalmerOYasmin        promethazine (PHENERGAN) tablet 12.5-25 mg  12.5-25 mg Q4HRS PRN MARGE PalmerOYasmin        promethazine  (PHENERGAN) suppository 12.5-25 mg  12.5-25 mg Q4HRS PRN MARGE PalmerOYasmin        prochlorperazine (COMPAZINE) injection 5-10 mg  5-10 mg Q4HRS PRN MARGE PalmerOYasmin        sucralfate (CARAFATE) 1 GM/10ML suspension 1 g  1 g Q6HRS MARLINE Palmer.O.   1 g at 12/01/22 0011    fentaNYL (SUBLIMAZE) injection 25 mcg  25 mcg Q3HRS PRN MARLINE Palmer.O.   25 mcg at 11/29/22 1659    oxyCODONE immediate-release (ROXICODONE) tablet 5-10 mg  5-10 mg Q4HRS PRN Christiano Bautista D.O.       Last reviewed on 11/29/2022 12:48 PM by Aimee Bravo, T     Review of Systems   Constitutional:  Negative for chills, fever and malaise/fatigue.   HENT:  Negative for congestion and sore throat.    Respiratory:  Negative for cough and wheezing.    Cardiovascular:  Negative for chest pain, palpitations, orthopnea and leg swelling.   Gastrointestinal:  Negative for abdominal pain, blood in stool, constipation, diarrhea, melena, nausea and vomiting.   Musculoskeletal:  Negative for falls and myalgias.   Neurological:  Positive for weakness. Negative for dizziness and headaches.   Psychiatric/Behavioral:  The patient is not nervous/anxious and does not have insomnia.    All other systems reviewed and are negative.    Vital signs:  Weight/BMI: Body mass index is 25.7 kg/m².  BP (!) 144/76   Pulse 79   Temp 36.1 °C (97 °F)   Resp 13   Wt 72.2 kg (159 lb 2.8 oz)   SpO2 99%   Vitals:    12/01/22 0000 12/01/22 0200 12/01/22 0400 12/01/22 0600   BP: 104/60 100/61 138/76 (!) 144/76   Pulse: 80 77 81 79   Resp: 15 13 16 13   Temp: 36.1 °C (97 °F)      TempSrc:       SpO2: 99% 100% 100% 99%   Weight:         Oxygen Therapy:  Pulse Oximetry: 99 %, O2 (LPM): 2, O2 Delivery Device: Silicone Nasal Cannula    Intake/Output Summary (Last 24 hours) at 12/1/2022 0926  Last data filed at 12/1/2022 0600  Gross per 24 hour   Intake 396.49 ml   Output 400 ml   Net -3.51 ml         Physical Exam:  Physical  Exam  Vitals and nursing note reviewed.   Constitutional:       General: She is not in acute distress.     Appearance: She is obese. She is not ill-appearing or toxic-appearing.   HENT:      Head: Normocephalic.      Nose: Nose normal.      Mouth/Throat:      Mouth: Mucous membranes are moist.      Pharynx: Oropharynx is clear.   Eyes:      General: No scleral icterus.     Conjunctiva/sclera: Conjunctivae normal.      Pupils: Pupils are equal, round, and reactive to light.   Cardiovascular:      Rate and Rhythm: Normal rate and regular rhythm.      Pulses: Normal pulses.      Heart sounds: Normal heart sounds. No murmur heard.    No gallop.   Pulmonary:      Effort: Pulmonary effort is normal. No respiratory distress.      Breath sounds: Normal breath sounds. No wheezing.   Chest:      Chest wall: No tenderness.   Abdominal:      General: Abdomen is flat. Bowel sounds are normal. There is no distension.      Palpations: Abdomen is soft.      Tenderness: There is no abdominal tenderness. There is no guarding.   Musculoskeletal:      Right lower leg: No edema.      Left lower leg: No edema.   Skin:     General: Skin is warm and dry.      Capillary Refill: Capillary refill takes less than 2 seconds.   Neurological:      General: No focal deficit present.      Mental Status: She is alert and oriented to person, place, and time. Mental status is at baseline.   Psychiatric:         Thought Content: Thought content normal.         Judgment: Judgment normal.      Comments: somnolent           Labs:  Recent Labs     11/29/22  1207 11/29/22  1628 11/30/22  0440 12/01/22  0750   SODIUM 139  --  137 139   POTASSIUM 3.0*  --  3.2* 2.5*   CHLORIDE 99  --  102 104   CO2 24  --  26 26   BUN 27*  --  20 14   CREATININE 0.83  --  0.57 0.43*   MAGNESIUM  --  0.9* 3.4* 1.3*   CALCIUM 8.2*  --  8.2* 7.8*     Recent Labs     11/29/22  1207 11/30/22  0440 12/01/22  0750   ALTSGPT 735*  --  623*   ASTSGOT 443*  --  362*   ALKPHOSPHAT 346*   --  286*   TBILIRUBIN 0.5  --  0.7   LIPASE 65  --   --    GLUCOSE 137* 65 81     Recent Labs     11/29/22  1207 11/30/22  0440 12/01/22  0750   WBC 13.9* 10.6 6.7   NEUTSPOLYS 87.00*  --   --    LYMPHOCYTES 6.00*  --   --    MONOCYTES 3.00  --   --    EOSINOPHILS 0.00  --   --    BASOPHILS 0.00  --   --    ASTSGOT 443*  --  362*   ALTSGPT 735*  --  623*   ALKPHOSPHAT 346*  --  286*   TBILIRUBIN 0.5  --  0.7     Recent Labs     11/29/22  1207 11/29/22  1705 11/30/22  0440 11/30/22  1110 11/30/22  1720 11/30/22  2224 12/01/22  0750   RBC 1.63*  --  3.16*  --   --   --  2.78*   HEMOGLOBIN 5.5*   < > 9.7*   < > 8.9* 8.4* 8.7*   HEMATOCRIT 18.0*  --  29.8*  --   --   --  26.7*   PLATELETCT 153*  --  97*  --   --   --  89*   PROTHROMBTM 15.1*  --   --   --   --   --   --    APTT 23.2*  --   --   --   --   --   --    INR 1.20*  --   --   --   --   --   --     < > = values in this interval not displayed.     Recent Results (from the past 24 hour(s))   HGB (Hemoglobin) for 48 hours    Collection Time: 11/30/22 11:10 AM   Result Value Ref Range    Hemoglobin 8.9 (L) 12.0 - 16.0 g/dL   HGB (Hemoglobin) for 48 hours    Collection Time: 11/30/22  5:20 PM   Result Value Ref Range    Hemoglobin 8.9 (L) 12.0 - 16.0 g/dL   HGB (Hemoglobin) for 48 hours    Collection Time: 11/30/22 10:24 PM   Result Value Ref Range    Hemoglobin 8.4 (L) 12.0 - 16.0 g/dL   Comp Metabolic Panel    Collection Time: 12/01/22  7:50 AM   Result Value Ref Range    Sodium 139 135 - 145 mmol/L    Potassium 2.5 (LL) 3.6 - 5.5 mmol/L    Chloride 104 96 - 112 mmol/L    Co2 26 20 - 33 mmol/L    Anion Gap 9.0 7.0 - 16.0    Glucose 81 65 - 99 mg/dL    Bun 14 8 - 22 mg/dL    Creatinine 0.43 (L) 0.50 - 1.40 mg/dL    Calcium 7.8 (L) 8.5 - 10.5 mg/dL    AST(SGOT) 362 (H) 12 - 45 U/L    ALT(SGPT) 623 (H) 2 - 50 U/L    Alkaline Phosphatase 286 (H) 30 - 99 U/L    Total Bilirubin 0.7 0.1 - 1.5 mg/dL    Albumin 2.5 (L) 3.2 - 4.9 g/dL    Total Protein 4.2 (L) 6.0 - 8.2 g/dL     Globulin 1.7 (L) 1.9 - 3.5 g/dL    A-G Ratio 1.5 g/dL   CBC WITHOUT DIFFERENTIAL    Collection Time: 12/01/22  7:50 AM   Result Value Ref Range    WBC 6.7 4.8 - 10.8 K/uL    RBC 2.78 (L) 4.20 - 5.40 M/uL    Hemoglobin 8.7 (L) 12.0 - 16.0 g/dL    Hematocrit 26.7 (L) 37.0 - 47.0 %    MCV 96.0 81.4 - 97.8 fL    MCH 31.3 27.0 - 33.0 pg    MCHC 32.6 (L) 33.6 - 35.0 g/dL    RDW 62.2 (H) 35.9 - 50.0 fL    Platelet Count 89 (L) 164 - 446 K/uL    MPV 11.7 9.0 - 12.9 fL   ESTIMATED GFR    Collection Time: 12/01/22  7:50 AM   Result Value Ref Range    GFR (CKD-EPI) 111 >60 mL/min/1.73 m 2   MAGNESIUM    Collection Time: 12/01/22  7:50 AM   Result Value Ref Range    Magnesium 1.3 (L) 1.5 - 2.5 mg/dL       Radiology Review:  CT-ABDOMEN LIVER FOR HEPATIC MASS (CIRRHOSIS)   Final Result         1.  Low-density lesion in the dome of the liver which is partially filled in an isodense on delayed imaging. Additional low-density right hepatic subcentimeter lesions are seen which are nonenhancing and isodense on delayed phase imaging.   2.  Small hiatal hernia      LI-RADS: LR-2: probably benign      DX-CHEST-PORTABLE (1 VIEW)   Final Result      1.  No acute cardiac or pulmonary abnormalities are identified.            MDM (Data Review):   -Records reviewed and summarized in current documentation  -I personally reviewed and interpreted the laboratory results  -I personally reviewed the radiology images    Assessment/Recommendations:  LA grade D esophagitis.  4cm hiatal hernia.  Gastritis.  Clean based pyloric channel ulcer.  Multiple large clean based duodenal ulcers.      Bleeding likely secondary to duodenal ulcers.  Pantoprazole 40mg po bid.  Sucralfate 1gm po Qid.  Monitor H/H and transfuse as needed.  Avoid NSAIDs, ASA.               Core Quality Measures   Reviewed items::  Labs, Medications and Radiology reports reviewed

## 2022-12-01 NOTE — CARE PLAN
The patient is Watcher - Medium risk of patient condition declining or worsening    Shift Goals  Clinical Goals: VSS, h&h stable  Patient Goals: sleep  Family Goals: SHAI    Progress made toward(s) clinical / shift goals:  H&H trended, stable, VSS    Patient is not progressing towards the following goals:

## 2022-12-01 NOTE — PROGRESS NOTES
"Pt refusing AM medications, stating \" I don't want any more help from you people\", pt educated on the importance of medications, pt still refuses. Pt also refusing AM lab draws, stating \"I'm going home today\", RN educated pt on importance of AM lab draws r/t GIB. Pt continues to refuse, despite education. MD Juan Luis notified of pts refusal of medications and lab draws.   "

## 2022-12-01 NOTE — PROGRESS NOTES
Report called to Ashleigh IRENE. Reviewed plan of care, medications, assessment and labs. All questions answered. Patient being transported via hospital bed by FABIAN Gomez to room R319.

## 2022-12-02 NOTE — PROGRESS NOTES
Received report from NOC, resumed care 0700.  Pt A&Ox4, irritable at times.  Assessment completed, labs collected. Port positional for blood return.  Facilitated phone call with daughter Katalina.   Call light within reach, strip bed alarm on, goals set for the day. Pt still with stool incontinence overnight per NOC. Will continue to monitor.

## 2022-12-02 NOTE — PROGRESS NOTES
[facility] Medicine Daily Progress Note    Date of Service  12/2/2022    Chief Complaint  Ashleigh Marquis is a 62 y.o. female admitted 11/29/2022 with dark and tarry stool, weakness    Hospital Course  No notes on file    Ashleigh Marquis is a 62 y.o. female who presented 11/29/2022 with previous medical history of includes alcohol abuse, breast cancer with known mets to the lung stage IV, she reports that she is not drinking actively at this point, depression, bipolar disorder, chronic pain, hypertension, recent subarachnoid hemorrhage, and recent GI bleed.       Patient was just in the hospital from November 3 to 24.  She was found down out in community, and was brought in here hypothermic with a temperature 93 degrees core.  Imaging showed frontal subarachnoid hemorrhage, for which the patient was treated nonoperatively.  She also had an MSSA pneumonia for which she was treated with antibiotics.  During her hospital stay she had a seizure, and was placed on Keppra as a result.  Significantly her hospital stay was also complicated by upper GI bleed for which she underwent endoscopy on November 22.  She was found to have esophagitis with multiple gastric and duodenal ulcers.  She did not have any esophageal varices at that time.     Patient presents back today with complaint of upper GI pain, and bloody emesis.  Symptoms began this morning.  She is also had diarrhea, however this is chronic for her.  She does note some black bowel movements earlier today.  Here in the emergency room the, the patient was initially hypotensive with systolic in the 70s, and had a lactic acid of 7.  She has since been transfused of 2 units of PRBCs, and given fluids.  On my examination she has systolic in the 90s, with a heart rate in the 80s.  GI has been consulted.    CT liver shows low density lesion in the dome of the liver, right hepatic subcentimeter lesions, small hiatal hernia.  Probably benign by LI-RADS.    Status post  EGD showing duodenal ulcers.  GI recommended PPI twice daily, sucralfate.    Interval Problem Update  Patient was seen and examined at bedside.  I have personally reviewed and interpreted vitals, labs, and imaging.    12/2.  Afebrile.  Episode tachycardia has resolved.  Hypotension has resolved.  On room air.  Hemoglobin 9.2, platelets 94, replete potassium.  Denies fever, chills, chest pain, shortness of breath.  States he is hungry and wants some yogurt.  No longer having dark tarry stool but still having significant diarrhea.  Ordered C. difficile.  PT/OT consult.    I have discussed this patient's plan of care and discharge plan at IDT rounds today with Case Management, Nursing, Nursing leadership, and other members of the IDT team.    Consultants/Specialty  GI    Code Status  Full Code    Disposition  Patient is not medically cleared for discharge.   Anticipate discharge to to skilled nursing facility.  I have placed the appropriate orders for post-discharge needs.    Review of Systems  Review of Systems   Unable to perform ROS: Psychiatric disorder   Constitutional:  Positive for malaise/fatigue.   Gastrointestinal:  Positive for abdominal pain and melena.      Physical Exam  Temp:  [36.1 °C (97 °F)-36.7 °C (98.1 °F)] 36.1 °C (97 °F)  Pulse:  [] 81  Resp:  [16-18] 16  BP: (100-115)/(56-76) 115/76  SpO2:  [97 %-100 %] 100 %    Physical Exam  Vitals and nursing note reviewed.   Constitutional:       General: She is not in acute distress.     Appearance: Normal appearance.   HENT:      Head: Normocephalic and atraumatic.      Right Ear: External ear normal.      Left Ear: External ear normal.      Nose: Nose normal.      Mouth/Throat:      Mouth: Mucous membranes are moist.      Pharynx: Oropharynx is clear. No oropharyngeal exudate or posterior oropharyngeal erythema.   Eyes:      Extraocular Movements: Extraocular movements intact.      Conjunctiva/sclera: Conjunctivae normal.   Cardiovascular:      Rate  and Rhythm: Normal rate and regular rhythm.      Pulses: Normal pulses.      Heart sounds: Normal heart sounds. No murmur heard.  Pulmonary:      Effort: Pulmonary effort is normal. No respiratory distress.      Breath sounds: Normal breath sounds. No stridor. No wheezing or rales.   Abdominal:      General: Abdomen is flat. Bowel sounds are normal. There is no distension.      Palpations: Abdomen is soft. There is no mass.      Tenderness: There is no abdominal tenderness.   Musculoskeletal:      Cervical back: Normal range of motion.   Skin:     General: Skin is warm.      Capillary Refill: Capillary refill takes less than 2 seconds.   Neurological:      General: No focal deficit present.      Mental Status: She is alert and oriented to person, place, and time. Mental status is at baseline.      Cranial Nerves: No cranial nerve deficit.   Psychiatric:         Mood and Affect: Mood normal.         Behavior: Behavior normal.       Fluids    Intake/Output Summary (Last 24 hours) at 12/2/2022 0827  Last data filed at 12/1/2022 0900  Gross per 24 hour   Intake 480 ml   Output --   Net 480 ml       Laboratory  Recent Labs     11/30/22  0440 11/30/22  1110 11/30/22  2224 12/01/22  0750 12/02/22  0759   WBC 10.6  --   --  6.7 7.2   RBC 3.16*  --   --  2.78* 2.90*   HEMOGLOBIN 9.7*   < > 8.4* 8.7* 9.2*   HEMATOCRIT 29.8*  --   --  26.7* 28.6*   MCV 94.3  --   --  96.0 98.6*   MCH 30.7  --   --  31.3 31.7   MCHC 32.6*  --   --  32.6* 32.2*   RDW 53.7*  --   --  62.2* 73.7*   PLATELETCT 97*  --   --  89* 94*   MPV 11.4  --   --  11.7 11.2    < > = values in this interval not displayed.     Recent Labs     11/29/22  1207 11/30/22  0440 12/01/22  0750 12/01/22  1602   SODIUM 139 137 139  --    POTASSIUM 3.0* 3.2* 2.5* 3.7   CHLORIDE 99 102 104  --    CO2 24 26 26  --    GLUCOSE 137* 65 81  --    BUN 27* 20 14  --    CREATININE 0.83 0.57 0.43*  --    CALCIUM 8.2* 8.2* 7.8*  --      Recent Labs     11/29/22  1207   APTT 23.2*    INR 1.20*               Imaging  CT-ABDOMEN LIVER FOR HEPATIC MASS (CIRRHOSIS)   Final Result   Addendum (preliminary) 1 of 1   The case was reviewed.      At least 2 of the 3 lesions described in the original report are new since    7/11/2022 study and represent new hepatic metastases. The larger    metastasis in the medial hepatic dome measures 1.4 cm.      Findings were discussed with Dr. Cummings on 12/1/2022 2:30 PM.      Final         1.  Low-density lesion in the dome of the liver which is partially filled in an isodense on delayed imaging. Additional low-density right hepatic subcentimeter lesions are seen which are nonenhancing and isodense on delayed phase imaging.   2.  Small hiatal hernia      LI-RADS: LR-2: probably benign      DX-CHEST-PORTABLE (1 VIEW)   Final Result      1.  No acute cardiac or pulmonary abnormalities are identified.           Assessment/Plan  * GI bleed- (present on admission)  Assessment & Plan  11/22/22 EGD: esophagitis and multiple gastric and duodenal ulcers  11/30/22 EGD:   POSTOPERATIVE DIAGNOSIS:   LA grade D esophagitis.  4cm hiatal hernia.  Gastritis.  Clean based pyloric channel ulcer.  Multiple large clean based duodenal ulcers.  No active bleeding.  GI consulting  Oral PPI BID  Sucralfate  Monitor CBC  Maintain good peripheral IV access and access her port  Advance diet  Discussed avoidance of NSAIDs, ASA, acidic foods/beverages, temperature hot foods and alcohol.    Hemorrhagic shock (HCC)  Assessment & Plan  S/p IV fluid resuscitation and PRBCs  11/30 had EGD w/o sign of active bleed but did have duodenal ulcer gastritis and esophagitis.  Maintained large-bore peripheral IV access  Daily CBC  Transfuse as appropriate  12/1 Hgb:8.7    Anemia associated with acute blood loss- (present on admission)  Assessment & Plan  Secondary to GI bleed  Getting 2 units of PRBCs in the ED  Daily CBC  Transfuse to goal greater than 7 or more aggressively should she become hypotensive  again  S/P EGD with ulcers and gastritis/esophagitis noted.    History of Subarachnoid hemorrhage (HCC)- (present on admission)  Assessment & Plan  Avoid NSAIDs, ASA, anticoagulation  Likely secondary to a fall.    Malignant neoplasm of right female breast (HCC)  Assessment & Plan  Patient is s/p initial chemotherapy and radiation therapy  She has known mets to the lung  Prior MRI brain suspicious of brain mets  She is currently on Keytruda infusions every 6 weeks and followed by Dr. Clemens  She has a port    Tobacco use- (present on admission)  Assessment & Plan  Encourage cessation    Lactic acidosis - resolved- (present on admission)  Assessment & Plan  Significant lactic acidosis in the setting of hemorrhagic shock  Ongoing resuscitation  Resolved    Transaminitis- (present on admission)  Assessment & Plan  Hepatitis panel positive for hepatitis C antibody earlier this month.  Hepatitis C RNA negative on 11/29.   CT liver shows low density lesion in the dome of the liver, right hepatic subcentimeter lesions, small hiatal hernia.  Probably benign by LI-RADS.  Continue to trend LFTs    Essential hypertension- (present on admission)  Assessment & Plan  Continue active treatment with amldopine 10mg daily and metoprolol 25mg BID  Monitoring vitals.    Hypomagnesemia- (present on admission)  Assessment & Plan  Patient has a history of hypomagnesemia  Check and replace as appropriate    Hypokalemia- (present on admission)  Assessment & Plan  12/1 K:2.5  Replace IV and oral  Check magnesium and replace if appropriate  Repeat lab in a.m.    Bipolar 2 disorder (HCC)- (present on admission)  Assessment & Plan  Start prozac 20mg daily       VTE prophylaxis: pharmacologic prophylaxis contraindicated due to recent GIB    I have performed a physical exam and reviewed and updated ROS and Plan today (12/2/2022). In review of yesterday's note (12/1/2022), there are no changes except as documented above.

## 2022-12-02 NOTE — CARE PLAN
The patient is Watcher - Medium risk of patient condition declining or worsening    Shift Goals  Clinical Goals: Monitor Lab  Patient Goals: DC planning  Family Goals: SHAI    Progress made toward(s) clinical / shift goals:    Problem: Skin Integrity  Goal: Skin integrity is maintained or improved  Outcome: Progressing  Note: Pt is a high fall risk.  Pt roomed near RN station, strip bed alarm placed. Pt verbalized understanding to call prior to getting OOB.        Patient is not progressing towards the following goals:      Problem: Bowel Elimination  Goal: Establish and maintain regular bowel function  Outcome: Not Progressing  Note: Pt having diarrhea since last night.  Physician notified, declined c-diff r/o at this time.  Continue to monitor and notify if diarrhea persists.

## 2022-12-02 NOTE — CARE PLAN
The patient is stable at this time  No evidence of GI bleed  Shift Goals  Clinical Goals: Monitor Lab  Patient Goals: DC planning  Family Goals: SHAI    Progress made toward(s) clinical / shift goals:  on going  progress    Patient is  progressing towards the following goals:

## 2022-12-02 NOTE — PROGRESS NOTES
4 Eyes Skin Assessment Completed by TETO Stoelo and TETO Cummins.    Head WDL  Ears WDL  Nose WDL  Mouth WDL  Neck WDL  Breast/Chest WDL  Shoulder Blades WDL  Spine WDL  (R) Arm/Elbow/Hand WDL  (L) Arm/Elbow/Hand WDL  Abdomen WDL  Groin Redness  Scrotum/Coccyx/Buttocks Blanching  (R) Leg WDL  (L) Leg WDL  (R) Heel/Foot/Toe WDL  (L) Heel/Foot/Toe WDL    Skin intact.  Redness to perianal area.  Barrier paste applied.       Devices In Places Pulse Ox      Interventions In Place Pillows and Barrier Cream    Possible Skin Injury No    Pictures Uploaded Into Epic N/A  Wound Consult Placed N/A  RN Wound Prevention Protocol Ordered No

## 2022-12-02 NOTE — PROGRESS NOTES
Gastroenterology Progress Note               Author:  CACHORRO Bee Date & Time Created: 12/2/2022 11:57 AM       Patient ID:  Name:             Ashleigh Marquis  YOB: 1960  Age:                 62 y.o.  female  MRN:               [id number]        Medical Decision Making, by Problem:  Active Hospital Problems    Diagnosis     Upper GI bleed [K92.2]     GI bleed [K92.2]     Hemorrhagic shock (HCC) [R57.8]     Anemia associated with acute blood loss [D62]     History of Subarachnoid hemorrhage (HCC) [I60.9]     Malignant neoplasm of right female breast (HCC) [C50.911]     Tobacco use [Z72.0]     Lactic acidosis - resolved [E87.20]     Transaminitis [R74.01]     Essential hypertension [I10]     Hypokalemia [E87.6]     Hypomagnesemia [E83.42]     Bipolar 2 disorder (HCC) [F31.81]            Presenting Chief Complaint:  GI bleed  anemia     History of Present Illness:   61 y/o female with past medical history of breast cancer metastatic to the lung, chronic pain, alcohol abuse, acute pancreatitis, HCV, asthma and cervical cancer presented to the hospital with SOB. The patient also reports vomiting blood. She vomited once last night and once this morning. She reports vomiting dark red blood. She also reports having dark tarry stools.  The patient was found to be anemic with a hemoglobin of 5.5. Her transaminases are elevated with ,  and . Normal TB at 0.5. She has a positive HCV antibody.     Of note, the patient was recently discharged from the [facility]. She was evaluated by Dr Miller (GI) and underwent an EGD for GI bleeding. She was found to have esophagitis with ulcerations, multiple gastric and duodenal ulcers.  She is established with Dr Amador (GI) as outpatient and was found to have lymphocytic colitis likely secondary to Keytruda. He did perform an EGD and colonoscopy 10/22/2022. EGD and colonoscopy were grossly normal but biopsies confirmed lymphocytic colitis.          Interval History:  12/1/2022: Post-procedure day #1 s/p EGD. Seen at bedside with Hospitalist.  AAOx4, somnolent mood. H/h stable.  +melanic stool. Denies n/v/d or hematochezia.    12/2/2022: Postprocedure day #2 s/p EGD.  Patient reports feeling well, with good appetite.  H&H stable.  Patient reports stools are light brown in color with medium consistency.      Hospital Medications:  Current Facility-Administered Medications   Medication Dose Frequency Provider Last Rate Last Admin    potassium chloride SA (Kdur) tablet 40 mEq  40 mEq TID Joseph Casillas D.O.        magnesium oxide tablet 400 mg  400 mg BID Joseph Casillas D.O.        Pharmacy Consult Request  1 Each PHARMACY TO DOSE Joseph Casillas D.O.        FLUoxetine (PROZAC) capsule 20 mg  20 mg DAILY MARGE BrownOYasmin   20 mg at 12/02/22 0611    omeprazole (PRILOSEC) capsule 20 mg  20 mg BID MARLINE Brown.OYasmin   20 mg at 12/02/22 0611    metoprolol tartrate (LOPRESSOR) tablet 25 mg  25 mg BID Christiano Bautista D.OYasmin   25 mg at 12/02/22 0611    levETIRAcetam (KEPPRA) tablet 500 mg  500 mg BID MARLINE Palmer.O.   500 mg at 12/02/22 0611    clonazePAM (KLONOPIN) tablet 2 mg  2 mg TID PRN MARGE PalmerO.        carisoprodol (SOMA) tablet 350 mg  350 mg Q6HRS PRN MARLINE Palmer.O.        amLODIPine (NORVASC) tablet 10 mg  10 mg DAILY MARLINE Palmer.O.   10 mg at 12/02/22 0610    senna-docusate (PERICOLACE or SENOKOT S) 8.6-50 MG per tablet 2 Tablet  2 Tablet BID MARLINE Palmer.O.   2 Tablet at 12/02/22 0610    And    polyethylene glycol/lytes (MIRALAX) PACKET 1 Packet  1 Packet QDAY PRN Christiano Bunuel-Genevieve, D.O.        And    magnesium hydroxide (MILK OF MAGNESIA) suspension 30 mL  30 mL QDAY PRN Christiano Bautista D.O.        And    bisacodyl (DULCOLAX) suppository 10 mg  10 mg QDAY PRN Christiano Bautista D.O.        Respiratory Therapy Consult   Continuous RT Christiano  MARLINE Bautista.O.        ondansetron (ZOFRAN) syringe/vial injection 4 mg  4 mg Q4HRS PRN MARLINE Palmer.O.        ondansetron (ZOFRAN ODT) dispertab 4 mg  4 mg Q4HRS PRN MARLINE Palmer.O.   4 mg at 12/01/22 1416    promethazine (PHENERGAN) tablet 12.5-25 mg  12.5-25 mg Q4HRS PRN MARLINE Palmer.O.        promethazine (PHENERGAN) suppository 12.5-25 mg  12.5-25 mg Q4HRS PRN MARLINE Palmer.O.        prochlorperazine (COMPAZINE) injection 5-10 mg  5-10 mg Q4HRS PRN MARLINE Palmer.O.        sucralfate (CARAFATE) 1 GM/10ML suspension 1 g  1 g Q6HRS MARLINE Palmer.O.   1 g at 12/02/22 0610    oxyCODONE immediate-release (ROXICODONE) tablet 5-10 mg  5-10 mg Q4HRS PRN MARLINE Palmer.O.       Last reviewed on 11/29/2022 12:48 PM by Aimee Bravo, [facility]     Review of Systems   Constitutional:  Negative for chills, fever and malaise/fatigue.   HENT:  Negative for congestion, nosebleeds and sore throat.    Respiratory:  Negative for cough and wheezing.    Cardiovascular:  Negative for chest pain, palpitations, orthopnea and leg swelling.   Gastrointestinal:  Negative for abdominal pain, blood in stool, constipation, diarrhea, melena, nausea and vomiting.   Genitourinary:  Negative for dysuria and flank pain.   Musculoskeletal:  Negative for falls and myalgias.   Neurological:  Positive for weakness. Negative for dizziness and headaches.   Psychiatric/Behavioral:  The patient is not nervous/anxious and does not have insomnia.    All other systems reviewed and are negative.    Vital signs:  Weight/BMI: Body mass index is 25.7 kg/m².  /66   Pulse 95   Temp 36.8 °C (98.2 °F) (Temporal)   Resp 16   Wt 72.2 kg (159 lb 2.8 oz)   SpO2 100%   Vitals:    12/01/22 1608 12/01/22 1929 12/02/22 0408 12/02/22 0923   BP: 108/71 102/72 115/76 100/66   Pulse: 99 90 81 95   Resp: 16 16 16 16   Temp: 36.6 °C (97.8 °F) 36.7 °C (98.1 °F) 36.1 °C (97 °F) 36.8 °C  (98.2 °F)   TempSrc: Temporal Temporal Temporal Temporal   SpO2: 97% 97% 100% 100%   Weight:         Oxygen Therapy:  Pulse Oximetry: 100 %, O2 (LPM): 0, O2 Delivery Device: None - Room Air  No intake or output data in the 24 hours ending 12/02/22 1157        Physical Exam:  Physical Exam  Vitals and nursing note reviewed.   Constitutional:       General: She is not in acute distress.     Appearance: She is obese. She is not ill-appearing or toxic-appearing.   HENT:      Head: Normocephalic.      Nose: Nose normal.      Mouth/Throat:      Mouth: Mucous membranes are moist.      Pharynx: Oropharynx is clear.   Eyes:      General: No scleral icterus.     Conjunctiva/sclera: Conjunctivae normal.      Pupils: Pupils are equal, round, and reactive to light.   Cardiovascular:      Rate and Rhythm: Normal rate and regular rhythm.      Pulses: Normal pulses.      Heart sounds: Normal heart sounds. No murmur heard.    No gallop.   Pulmonary:      Effort: Pulmonary effort is normal. No respiratory distress.      Breath sounds: Normal breath sounds. No wheezing.   Chest:      Chest wall: No tenderness.   Abdominal:      General: Abdomen is flat. Bowel sounds are normal. There is no distension.      Palpations: Abdomen is soft.      Tenderness: There is no abdominal tenderness. There is no guarding.   Musculoskeletal:      Right lower leg: No edema.      Left lower leg: No edema.   Skin:     General: Skin is warm and dry.      Capillary Refill: Capillary refill takes less than 2 seconds.      Coloration: Skin is not jaundiced or pale.   Neurological:      General: No focal deficit present.      Mental Status: She is alert and oriented to person, place, and time. Mental status is at baseline.      Cranial Nerves: No cranial nerve deficit.   Psychiatric:         Mood and Affect: Mood normal.         Behavior: Behavior normal.         Thought Content: Thought content normal.         Judgment: Judgment normal.            Labs:  Recent Labs     11/29/22  1628 11/30/22  0440 12/01/22 0750 12/01/22  1602 12/02/22  0759   SODIUM  --  137 139  --  138   POTASSIUM  --  3.2* 2.5* 3.7 3.0*   CHLORIDE  --  102 104  --  107   CO2  --  26 26  --  23   BUN  --  20 14  --  9   CREATININE  --  0.57 0.43*  --  0.48*   MAGNESIUM 0.9* 3.4* 1.3*  --   --    CALCIUM  --  8.2* 7.8*  --  7.8*       Recent Labs     11/29/22  1207 11/30/22 0440 12/01/22 0750 12/02/22 0759   ALTSGPT 735*  --  623*  --    ASTSGOT 443*  --  362*  --    ALKPHOSPHAT 346*  --  286*  --    TBILIRUBIN 0.5  --  0.7  --    LIPASE 65  --   --   --    GLUCOSE 137* 65 81 70       Recent Labs     11/29/22  1207 11/30/22 0440 12/01/22 0750 12/02/22 0759   WBC 13.9* 10.6 6.7 7.2   NEUTSPOLYS 87.00*  --   --   --    LYMPHOCYTES 6.00*  --   --   --    MONOCYTES 3.00  --   --   --    EOSINOPHILS 0.00  --   --   --    BASOPHILS 0.00  --   --   --    ASTSGOT 443*  --  362*  --    ALTSGPT 735*  --  623*  --    ALKPHOSPHAT 346*  --  286*  --    TBILIRUBIN 0.5  --  0.7  --        Recent Labs     11/29/22  1207 11/29/22  1705 11/30/22  0440 11/30/22  1110 11/30/22  2224 12/01/22 0750 12/02/22 0759   RBC 1.63*  --  3.16*  --   --  2.78* 2.90*   HEMOGLOBIN 5.5*   < > 9.7*   < > 8.4* 8.7* 9.2*   HEMATOCRIT 18.0*  --  29.8*  --   --  26.7* 28.6*   PLATELETCT 153*  --  97*  --   --  89* 94*   PROTHROMBTM 15.1*  --   --   --   --   --   --    APTT 23.2*  --   --   --   --   --   --    INR 1.20*  --   --   --   --   --   --     < > = values in this interval not displayed.       Recent Results (from the past 24 hour(s))   POTASSIUM SERUM (K)    Collection Time: 12/01/22  4:02 PM   Result Value Ref Range    Potassium 3.7 3.6 - 5.5 mmol/L   Basic Metabolic Panel    Collection Time: 12/02/22  7:59 AM   Result Value Ref Range    Sodium 138 135 - 145 mmol/L    Potassium 3.0 (L) 3.6 - 5.5 mmol/L    Chloride 107 96 - 112 mmol/L    Co2 23 20 - 33 mmol/L    Glucose 70 65 - 99 mg/dL    Bun 9 8 -  22 mg/dL    Creatinine 0.48 (L) 0.50 - 1.40 mg/dL    Calcium 7.8 (L) 8.5 - 10.5 mg/dL    Anion Gap 8.0 7.0 - 16.0   CBC WITHOUT DIFFERENTIAL    Collection Time: 12/02/22  7:59 AM   Result Value Ref Range    WBC 7.2 4.8 - 10.8 K/uL    RBC 2.90 (L) 4.20 - 5.40 M/uL    Hemoglobin 9.2 (L) 12.0 - 16.0 g/dL    Hematocrit 28.6 (L) 37.0 - 47.0 %    MCV 98.6 (H) 81.4 - 97.8 fL    MCH 31.7 27.0 - 33.0 pg    MCHC 32.2 (L) 33.6 - 35.0 g/dL    RDW 73.7 (H) 35.9 - 50.0 fL    Platelet Count 94 (L) 164 - 446 K/uL    MPV 11.2 9.0 - 12.9 fL   ESTIMATED GFR    Collection Time: 12/02/22  7:59 AM   Result Value Ref Range    GFR (CKD-EPI) 108 >60 mL/min/1.73 m 2       Radiology Review:  CT-ABDOMEN LIVER FOR HEPATIC MASS (CIRRHOSIS)   Final Result   Addendum (preliminary) 1 of 1   The case was reviewed.      At least 2 of the 3 lesions described in the original report are new since    7/11/2022 study and represent new hepatic metastases. The larger    metastasis in the medial hepatic dome measures 1.4 cm.      Findings were discussed with Dr. Cummings on 12/1/2022 2:30 PM.      Final         1.  Low-density lesion in the dome of the liver which is partially filled in an isodense on delayed imaging. Additional low-density right hepatic subcentimeter lesions are seen which are nonenhancing and isodense on delayed phase imaging.   2.  Small hiatal hernia      LI-RADS: LR-2: probably benign      DX-CHEST-PORTABLE (1 VIEW)   Final Result      1.  No acute cardiac or pulmonary abnormalities are identified.            MDM (Data Review):   -Records reviewed and summarized in current documentation  -I personally reviewed and interpreted the laboratory results  -I personally reviewed the radiology images    Assessment/Recommendations:  LA grade D esophagitis.  4cm hiatal hernia.  Gastritis.  Clean based pyloric channel ulcer.  Multiple large clean based duodenal ulcers.      Bleeding likely secondary to duodenal ulcers.  Pantoprazole 40mg po  bid.  Sucralfate 1gm po Qid.  Monitor H/H and transfuse as needed.  Avoid NSAIDs, ASA.       Clear from GI aspect.  GI to sign off.  Follow-up with outpatient GI-[facility]        Core Quality Measures   Reviewed items::  Labs, Medications and Radiology reports reviewed

## 2022-12-03 PROBLEM — Z71.89 ADVANCED CARE PLANNING/COUNSELING DISCUSSION: Status: ACTIVE | Noted: 2022-01-01

## 2022-12-03 NOTE — CARE PLAN
The patient is Stable - Low risk of patient condition declining or worsening    Shift Goals  Clinical Goals: monitor stool  Patient Goals: talk to hospice  Family Goals: SHAI    Progress made toward(s) clinical / shift goals:      Patient is not progressing towards the following goals: Patient is incontinent of bladder and bowel. Pt having loose bowel movements. Due to incontinence pt buttock is becoming irritated and excoriated. Barrier cream used to help alleviate discomfort.        Problem: Skin Integrity  Goal: Skin integrity is maintained or improved  Outcome: Not Progressing     Problem: Bowel Elimination  Goal: Establish and maintain regular bowel function  Outcome: Not Progressing

## 2022-12-03 NOTE — PROGRESS NOTES
"Pt refusing all medications. States \" I'm going on hospice, I'm not taking anything. \" Patient AAOx4 and speaking in complete sentences. RN educated pt on importance of taking daily medications. Pt continues to refuse. MD notified.  "

## 2022-12-03 NOTE — CARE PLAN
"The patient is Watcher - Medium risk of patient condition declining or worsening    Shift Goals  Clinical Goals: control stool  Patient Goals: \"have daughter visit\"  Family Goals: SHAI    Progress made toward(s) clinical / shift goals:    Problem: Skin Integrity  Goal: Skin integrity is maintained or improved  Outcome: Progressing  Note: Barrier wipes and paste in use.      Problem: Fall Risk  Goal: Patient will remain free from falls  Outcome: Progressing  Note: Strip bed alarm on; pt roomed near nurses station.  Frequent toileting offered.        Patient is not progressing towards the following goals:      Problem: Bowel Elimination  Goal: Establish and maintain regular bowel function  Outcome: Not Progressing  Note: Pt still with incontinent watery stool. Order for c-diff r/o obtained; stool sent to lab.       "

## 2022-12-03 NOTE — ASSESSMENT & PLAN NOTE
Patient was of sound mind and voluntarily participated in the conversation.  Patient was present for the conversation.  I did speak with her daughter over the phone.  I discussed advance care planning face to face with the patient and family for at least 30 minutes, including diagnosis, prognosis, plan of care, risks and benefits of any therapies that could be offered, as well as alternatives including palliation and hospice, as appropriate.  Forms were completed and placed in the chart.      Patient requested comfort care measures only.  She is now very clear that she wants hospice and no she is not going to get better.  Once only Ativan and morphine and no other medications.  Does not want blood draws or therapy.  Started on comfort care measures only.

## 2022-12-03 NOTE — CARE PLAN
"The patient is Stable - Low risk of patient condition declining or worsening    Shift Goals  Clinical Goals: control stool  Patient Goals: \"have daughter visit\"  Family Goals: SHAI    Progress made toward(s) clinical / shift goals:    Problem: Pain - Standard  Goal: Alleviation of pain or a reduction in pain to the patient’s comfort goal  Outcome: Progressing     Problem: Knowledge Deficit - Standard  Goal: Patient and family/care givers will demonstrate understanding of plan of care, disease process/condition, diagnostic tests and medications  Outcome: Progressing     Problem: Skin Integrity  Goal: Skin integrity is maintained or improved  Outcome: Progressing     Problem: Fall Risk  Goal: Patient will remain free from falls  Outcome: Progressing     Problem: Bowel Elimination  Goal: Establish and maintain regular bowel function  Outcome: Progressing       Patient is not progressing towards the following goals:      "

## 2022-12-03 NOTE — PROGRESS NOTES
[facility] Medicine Daily Progress Note    Date of Service  12/3/2022    Chief Complaint  Ashleigh Marquis is a 62 y.o. female admitted 11/29/2022 with dark and tarry stool, weakness    Hospital Course  No notes on file    Ashleigh Marquis is a 62 y.o. female who presented 11/29/2022 with previous medical history of includes alcohol abuse, breast cancer with known mets to the lung stage IV, she reports that she is not drinking actively at this point, depression, bipolar disorder, chronic pain, hypertension, recent subarachnoid hemorrhage, and recent GI bleed.       Patient was just in the hospital from November 3 to 24.  She was found down out in community, and was brought in here hypothermic with a temperature 93 degrees core.  Imaging showed frontal subarachnoid hemorrhage, for which the patient was treated nonoperatively.  She also had an MSSA pneumonia for which she was treated with antibiotics.  During her hospital stay she had a seizure, and was placed on Keppra as a result.  Significantly her hospital stay was also complicated by upper GI bleed for which she underwent endoscopy on November 22.  She was found to have esophagitis with multiple gastric and duodenal ulcers.  She did not have any esophageal varices at that time.     Patient presents back today with complaint of upper GI pain, and bloody emesis.  Symptoms began this morning.  She is also had diarrhea, however this is chronic for her.  She does note some black bowel movements earlier today.  Here in the emergency room the, the patient was initially hypotensive with systolic in the 70s, and had a lactic acid of 7.  She has since been transfused of 2 units of PRBCs, and given fluids.  On my examination she has systolic in the 90s, with a heart rate in the 80s.  GI has been consulted.    CT liver shows low density lesion in the dome of the liver, right hepatic subcentimeter lesions, small hiatal hernia.  Probably benign by LI-RADS.    Status post  EGD showing duodenal ulcers.  GI recommended PPI twice daily, sucralfate.    Interval Problem Update  Patient was seen and examined at bedside.  I have personally reviewed and interpreted vitals, labs, and imaging.    12/2.  Afebrile.  Episode tachycardia has resolved.  Hypotension has resolved.  On room air.  Hemoglobin 9.2, platelets 94, replete potassium.  Denies fever, chills, chest pain, shortness of breath.  States he is hungry and wants some yogurt.  No longer having dark tarry stool but still having significant diarrhea.  Ordered C. difficile.  PT/OT consult.  12/3.  Afebrile.  Tachycardic this morning.  On room air.  Patient refused all of her a.m. meds this morning as well as morning labs.  Denies fevers, chills or chest pains, shortness of breath.  Denies abdominal pain.  Has been tolerating oral intake but does not like the food.  Patient tells me that she wants hospice and no longer wants blood draws and no only pain medication.  Patient is alert and oriented.  And did not feel she can be understands that she says she is still wants to get better and stronger.  She is very clear that she does not want resuscitation or intubation.  Placed DNR/DNI order.  Ordered end-of-life discussion hospice consult.  Goals of care discussion ongoing.  Patient does have a psychiatric history and gets irritated with multiple questions.  I did update her daughter Katalina about changed to DNR/DNI and hospice consult more so just for information so the patient can understand    I have discussed this patient's plan of care and discharge plan at IDT rounds today with Case Management, Nursing, Nursing leadership, and other members of the IDT team.    Consultants/Specialty  GI    Code Status  Full Code    Disposition  Patient is not medically cleared for discharge.   Anticipate discharge to to skilled nursing facility.  I have placed the appropriate orders for post-discharge needs.    Review of Systems  Review of Systems    Unable to perform ROS: Psychiatric disorder   Constitutional:  Negative for chills and fever.   Respiratory:  Negative for cough and shortness of breath.    Cardiovascular:  Negative for chest pain, palpitations and leg swelling.   Gastrointestinal:  Negative for abdominal pain, constipation, diarrhea, melena (Resolved), nausea and vomiting.   Genitourinary:  Negative for dysuria, frequency and urgency.   Musculoskeletal:  Negative for falls.   Neurological:  Negative for weakness.   Psychiatric/Behavioral:  Negative for depression. The patient is not nervous/anxious.    All other systems reviewed and are negative.     Physical Exam  Temp:  [36.5 °C (97.7 °F)-37.2 °C (99 °F)] 37.1 °C (98.7 °F)  Pulse:  [] 107  Resp:  [16-18] 18  BP: (107-127)/(77-89) 112/79  SpO2:  [95 %-100 %] 99 %    Physical Exam  Vitals and nursing note reviewed.   Constitutional:       General: She is not in acute distress.     Appearance: Normal appearance.   HENT:      Head: Normocephalic and atraumatic.      Right Ear: External ear normal.      Left Ear: External ear normal.      Nose: Nose normal.      Mouth/Throat:      Mouth: Mucous membranes are moist.      Pharynx: Oropharynx is clear.   Eyes:      Extraocular Movements: Extraocular movements intact.      Conjunctiva/sclera: Conjunctivae normal.   Cardiovascular:      Rate and Rhythm: Regular rhythm. Tachycardia present.      Pulses: Normal pulses.      Heart sounds: Normal heart sounds. No murmur heard.  Pulmonary:      Effort: Pulmonary effort is normal. No respiratory distress.      Breath sounds: Normal breath sounds. No stridor. No wheezing or rales.   Abdominal:      General: Abdomen is flat. Bowel sounds are normal. There is no distension.      Palpations: Abdomen is soft. There is no mass.      Tenderness: There is no abdominal tenderness.   Musculoskeletal:      Cervical back: Normal range of motion.      Comments: Right chest port   Skin:     General: Skin is warm.       Capillary Refill: Capillary refill takes less than 2 seconds.   Neurological:      General: No focal deficit present.      Mental Status: She is alert and oriented to person, place, and time. Mental status is at baseline.      Cranial Nerves: No cranial nerve deficit.   Psychiatric:         Mood and Affect: Mood normal.         Behavior: Behavior normal.       Fluids  No intake or output data in the 24 hours ending 12/03/22 1017      Laboratory  Recent Labs     11/30/22  2224 12/01/22  0750 12/02/22  0759   WBC  --  6.7 7.2   RBC  --  2.78* 2.90*   HEMOGLOBIN 8.4* 8.7* 9.2*   HEMATOCRIT  --  26.7* 28.6*   MCV  --  96.0 98.6*   MCH  --  31.3 31.7   MCHC  --  32.6* 32.2*   RDW  --  62.2* 73.7*   PLATELETCT  --  89* 94*   MPV  --  11.7 11.2       Recent Labs     12/01/22  0750 12/01/22  1602 12/02/22  0759   SODIUM 139  --  138   POTASSIUM 2.5* 3.7 3.0*   CHLORIDE 104  --  107   CO2 26  --  23   GLUCOSE 81  --  70   BUN 14  --  9   CREATININE 0.43*  --  0.48*   CALCIUM 7.8*  --  7.8*                       Imaging  CT-ABDOMEN LIVER FOR HEPATIC MASS (CIRRHOSIS)   Final Result   Addendum (preliminary) 1 of 1   The case was reviewed.      At least 2 of the 3 lesions described in the original report are new since    7/11/2022 study and represent new hepatic metastases. The larger    metastasis in the medial hepatic dome measures 1.4 cm.      Findings were discussed with Dr. Cummings on 12/1/2022 2:30 PM.      Final         1.  Low-density lesion in the dome of the liver which is partially filled in an isodense on delayed imaging. Additional low-density right hepatic subcentimeter lesions are seen which are nonenhancing and isodense on delayed phase imaging.   2.  Small hiatal hernia      LI-RADS: LR-2: probably benign      DX-CHEST-PORTABLE (1 VIEW)   Final Result      1.  No acute cardiac or pulmonary abnormalities are identified.             Assessment/Plan  * GI bleed- (present on admission)  Assessment & Plan  11/22/22  EGD: esophagitis and multiple gastric and duodenal ulcers  11/30/22 EGD:   POSTOPERATIVE DIAGNOSIS:   LA grade D esophagitis.  4cm hiatal hernia.  Gastritis.  Clean based pyloric channel ulcer.  Multiple large clean based duodenal ulcers.  No active bleeding.  GI consulting  Oral PPI BID  Sucralfate  Monitor CBC  Maintain good peripheral IV access and access her port  Advance diet  Discussed avoidance of NSAIDs, ASA, acidic foods/beverages, temperature hot foods and alcohol.    Advanced care planning/counseling discussion  Assessment & Plan  Patient was of sound mind and voluntarily participated in the conversation.  Patient was present for the conversation.  I did speak with her daughter over the phone.  I discussed advance care planning face to face with the patient and family for at least 30 minutes, including diagnosis, prognosis, plan of care, risks and benefits of any therapies that could be offered, as well as alternatives including palliation and hospice, as appropriate.  Forms were completed and placed in the chart.      Patient is DNR/DNI.  End-of-life consult discussion placed.    Hemorrhagic shock (HCC)  Assessment & Plan  S/p IV fluid resuscitation and PRBCs  11/30 had EGD w/o sign of active bleed but did have duodenal ulcer gastritis and esophagitis.  Maintained large-bore peripheral IV access  Daily CBC  Transfuse as appropriate  12/1 Hgb:8.7    Anemia associated with acute blood loss- (present on admission)  Assessment & Plan  Secondary to GI bleed  Getting 2 units of PRBCs in the ED  Daily CBC  Transfuse to goal greater than 7 or more aggressively should she become hypotensive again  S/P EGD with ulcers and gastritis/esophagitis noted.    History of Subarachnoid hemorrhage (HCC)- (present on admission)  Assessment & Plan  Avoid NSAIDs, ASA, anticoagulation  Likely secondary to a fall.    Malignant neoplasm of right female breast (HCC)  Assessment & Plan  Patient is s/p initial chemotherapy and  radiation therapy  She has known mets to the lung  Prior MRI brain suspicious of brain mets  She is currently on Keytruda infusions every 6 weeks and followed by Dr. Clemens  She has a port    Tobacco use- (present on admission)  Assessment & Plan  Encourage cessation    Lactic acidosis - resolved- (present on admission)  Assessment & Plan  Significant lactic acidosis in the setting of hemorrhagic shock  Ongoing resuscitation  Resolved    Transaminitis- (present on admission)  Assessment & Plan  Hepatitis panel positive for hepatitis C antibody earlier this month.  Hepatitis C RNA negative on 11/29.   CT liver shows low density lesion in the dome of the liver, right hepatic subcentimeter lesions, small hiatal hernia.  Probably benign by LI-RADS.  Continue to trend LFTs    Essential hypertension- (present on admission)  Assessment & Plan  Continue active treatment with amldopine 10mg daily and metoprolol 25mg BID  Monitoring vitals.    Hypomagnesemia- (present on admission)  Assessment & Plan  Patient has a history of hypomagnesemia  Check and replace as appropriate    Hypokalemia- (present on admission)  Assessment & Plan  12/1 K:2.5  Replace IV and oral  Check magnesium and replace if appropriate  Repeat lab in a.m.    Bipolar 2 disorder (HCC)- (present on admission)  Assessment & Plan  Start prozac 20mg daily       VTE prophylaxis: pharmacologic prophylaxis contraindicated due to recent GIB    I have performed a physical exam and reviewed and updated ROS and Plan today (12/3/2022). In review of yesterday's note (12/2/2022), there are no changes except as documented above.

## 2022-12-04 NOTE — CARE PLAN
"The patient is Stable - Low risk of patient condition declining or worsening    Shift Goals  Clinical Goals: Pain control and comfort  Patient Goals: Comfort  Family Goals: SHAI    Progress made toward(s) clinical / shift goals:    Problem: Pain - Standard  Goal: Alleviation of pain or a reduction in pain to the patient’s comfort goal  Outcome: Progressing  Note: Collaborating with MD to ensure appropriate medications are ordered for pain management. Pt educated on pain scale and medicated per MAR.     Problem: Knowledge Deficit - Standard  Goal: Patient and family/care givers will demonstrate understanding of plan of care, disease process/condition, diagnostic tests and medications  Outcome: Progressing  Note: Pt and pts daughter updated on POC . Pt stating \"I know I am terminal and I just want the appropriate medications on board to keep me comfortable.\" MD notified of pt statement and awaiting hospice conversation.           "

## 2022-12-04 NOTE — CARE PLAN
The patient is Stable - Low risk of patient condition declining or worsening    Shift Goals  Clinical Goals: Pain control, monitor stool  Patient Goals: Rest  Family Goals: SHAI    Patient was medicated per MAR for pain. Patient ambulated to bathroom with assistance. Patient was educated on medications and general port care. Patient denies any concerns at this time.    Progress made toward(s) clinical / shift goals:      Problem: Pain - Standard  Goal: Alleviation of pain or a reduction in pain to the patient’s comfort goal  Outcome: Progressing     Problem: Knowledge Deficit - Standard  Goal: Patient and family/care givers will demonstrate understanding of plan of care, disease process/condition, diagnostic tests and medications  Outcome: Progressing       Patient is not progressing towards the following goals:

## 2022-12-04 NOTE — PROGRESS NOTES
[facility] Medicine Daily Progress Note    Date of Service  12/4/2022    Chief Complaint  Ashleigh Marquis is a 62 y.o. female admitted 11/29/2022 with dark and tarry stool, weakness    Hospital Course  No notes on file    Ashleigh Marquis is a 62 y.o. female who presented 11/29/2022 with previous medical history of includes alcohol abuse, breast cancer with known mets to the lung stage IV, she reports that she is not drinking actively at this point, depression, bipolar disorder, chronic pain, hypertension, recent subarachnoid hemorrhage, and recent GI bleed.       Patient was just in the hospital from November 3 to 24.  She was found down out in community, and was brought in here hypothermic with a temperature 93 degrees core.  Imaging showed frontal subarachnoid hemorrhage, for which the patient was treated nonoperatively.  She also had an MSSA pneumonia for which she was treated with antibiotics.  During her hospital stay she had a seizure, and was placed on Keppra as a result.  Significantly her hospital stay was also complicated by upper GI bleed for which she underwent endoscopy on November 22.  She was found to have esophagitis with multiple gastric and duodenal ulcers.  She did not have any esophageal varices at that time.     Patient presents back today with complaint of upper GI pain, and bloody emesis.  Symptoms began this morning.  She is also had diarrhea, however this is chronic for her.  She does note some black bowel movements earlier today.  Here in the emergency room the, the patient was initially hypotensive with systolic in the 70s, and had a lactic acid of 7.  She has since been transfused of 2 units of PRBCs, and given fluids.  On my examination she has systolic in the 90s, with a heart rate in the 80s.  GI has been consulted.    CT liver shows low density lesion in the dome of the liver, right hepatic subcentimeter lesions, small hiatal hernia.  Probably benign by LI-RADS.    Status post  EGD showing duodenal ulcers.  GI recommended PPI twice daily, sucralfate.    Interval Problem Update  Patient was seen and examined at bedside.  I have personally reviewed and interpreted vitals, labs, and imaging.    12/2.  Afebrile.  Episode tachycardia has resolved.  Hypotension has resolved.  On room air.  Hemoglobin 9.2, platelets 94, replete potassium.  Denies fever, chills, chest pain, shortness of breath.  States he is hungry and wants some yogurt.  No longer having dark tarry stool but still having significant diarrhea.  Ordered C. difficile.  PT/OT consult.  12/3.  Afebrile.  Tachycardic this morning.  On room air.  Patient refused all of her a.m. meds this morning as well as morning labs.  Denies fevers, chills or chest pains, shortness of breath.  Denies abdominal pain.  Has been tolerating oral intake but does not like the food.  Patient tells me that she wants hospice and no longer wants blood draws and no only pain medication.  Patient is alert and oriented.  And did not feel she can be understands that she says she is still wants to get better and stronger.  She is very clear that she does not want resuscitation or intubation.  Placed DNR/DNI order.  Ordered end-of-life discussion hospice consult.  Goals of care discussion ongoing.  Patient does have a psychiatric history and gets irritated with multiple questions.  I did update her daughter Katalina about changed to DNR/DNI and hospice consult more so just for information so the patient can understand  12/4.  Afebrile.  Intermittent tachycardia and hypotension.  On room air.  Patient is again requesting hospice.  States she wants hospice to get on the right pain medicine.  When questioned further she states that she is not in pain and that her pain medicine is working.  She also states that she would like to get better.  She did take her medications this morning.  Awaiting hospice end-of-life consult.  More informational as I do not believe patient  understands what hospice is and her goals of care.  She does get irritable and asked to many questions.    I have discussed this patient's plan of care and discharge plan at IDT rounds today with Case Management, Nursing, Nursing leadership, and other members of the IDT team.    Consultants/Specialty  GI    Code Status  DNAR/DNI    Disposition  Patient is not medically cleared for discharge.   Anticipate discharge to to skilled nursing facility.  I have placed the appropriate orders for post-discharge needs.    Review of Systems  Review of Systems   Unable to perform ROS: Psychiatric disorder   Constitutional:  Negative for chills and fever.   Respiratory:  Negative for cough and shortness of breath.    Cardiovascular:  Negative for chest pain, palpitations and leg swelling.   Gastrointestinal:  Negative for abdominal pain, constipation, diarrhea, melena (Resolved), nausea and vomiting.   Genitourinary:  Negative for dysuria, frequency and urgency.   Musculoskeletal:  Negative for falls.   Neurological:  Negative for weakness.   Psychiatric/Behavioral:  Negative for depression. The patient is not nervous/anxious.    All other systems reviewed and are negative.     Physical Exam  Temp:  [36.3 °C (97.3 °F)-38.2 °C (100.7 °F)] 36.5 °C (97.7 °F)  Pulse:  [] 83  Resp:  [16-18] 17  BP: ()/(62-86) 112/77  SpO2:  [93 %-100 %] 93 %    Physical Exam  Vitals and nursing note reviewed.   Constitutional:       General: She is not in acute distress.     Appearance: Normal appearance.   HENT:      Head: Normocephalic and atraumatic.      Right Ear: External ear normal.      Left Ear: External ear normal.      Nose: Nose normal.      Mouth/Throat:      Mouth: Mucous membranes are moist.      Pharynx: Oropharynx is clear.   Eyes:      Extraocular Movements: Extraocular movements intact.      Conjunctiva/sclera: Conjunctivae normal.   Cardiovascular:      Rate and Rhythm: Regular rhythm. Tachycardia present.      Pulses:  Normal pulses.      Heart sounds: Normal heart sounds. No murmur heard.  Pulmonary:      Effort: Pulmonary effort is normal. No respiratory distress.      Breath sounds: Normal breath sounds. No stridor. No wheezing or rales.   Abdominal:      General: Abdomen is flat. Bowel sounds are normal. There is no distension.      Palpations: Abdomen is soft. There is no mass.      Tenderness: There is no abdominal tenderness.   Musculoskeletal:      Cervical back: Normal range of motion.      Comments: Right chest port   Skin:     General: Skin is warm.      Capillary Refill: Capillary refill takes less than 2 seconds.   Neurological:      General: No focal deficit present.      Mental Status: She is alert and oriented to person, place, and time. Mental status is at baseline.      Cranial Nerves: No cranial nerve deficit.   Psychiatric:         Mood and Affect: Mood normal.         Behavior: Behavior normal.       Fluids  No intake or output data in the 24 hours ending 12/04/22 0843      Laboratory  Recent Labs     12/02/22  0759 12/03/22  1123   WBC 7.2 8.6   RBC 2.90* 2.97*   HEMOGLOBIN 9.2* 9.4*   HEMATOCRIT 28.6* 29.8*   MCV 98.6* 100.3*   MCH 31.7 31.6   MCHC 32.2* 31.5*   RDW 73.7* 76.3*   PLATELETCT 94* 103*   MPV 11.2 11.0       Recent Labs     12/01/22  1602 12/02/22  0759 12/03/22  1123   SODIUM  --  138 138   POTASSIUM 3.7 3.0* 3.1*   CHLORIDE  --  107 110   CO2  --  23 18*   GLUCOSE  --  70 85   BUN  --  9 9   CREATININE  --  0.48* 0.54   CALCIUM  --  7.8* 8.0*                       Imaging  CT-ABDOMEN LIVER FOR HEPATIC MASS (CIRRHOSIS)   Final Result   Addendum (preliminary) 1 of 1   The case was reviewed.      At least 2 of the 3 lesions described in the original report are new since    7/11/2022 study and represent new hepatic metastases. The larger    metastasis in the medial hepatic dome measures 1.4 cm.      Findings were discussed with Dr. Cummings on 12/1/2022 2:30 PM.      Final         1.  Low-density  lesion in the dome of the liver which is partially filled in an isodense on delayed imaging. Additional low-density right hepatic subcentimeter lesions are seen which are nonenhancing and isodense on delayed phase imaging.   2.  Small hiatal hernia      LI-RADS: LR-2: probably benign      DX-CHEST-PORTABLE (1 VIEW)   Final Result      1.  No acute cardiac or pulmonary abnormalities are identified.             Assessment/Plan  * GI bleed- (present on admission)  Assessment & Plan  11/22/22 EGD: esophagitis and multiple gastric and duodenal ulcers  11/30/22 EGD:   POSTOPERATIVE DIAGNOSIS:   LA grade D esophagitis.  4cm hiatal hernia.  Gastritis.  Clean based pyloric channel ulcer.  Multiple large clean based duodenal ulcers.  No active bleeding.  GI consulting  Oral PPI BID  Sucralfate  Monitor CBC  Maintain good peripheral IV access and access her port  Advance diet  Discussed avoidance of NSAIDs, ASA, acidic foods/beverages, temperature hot foods and alcohol.    Advanced care planning/counseling discussion  Assessment & Plan  Patient was of sound mind and voluntarily participated in the conversation.  Patient was present for the conversation.  I did speak with her daughter over the phone.  I discussed advance care planning face to face with the patient and family for at least 30 minutes, including diagnosis, prognosis, plan of care, risks and benefits of any therapies that could be offered, as well as alternatives including palliation and hospice, as appropriate.  Forms were completed and placed in the chart.      Patient is DNR/DNI.  End-of-life consult discussion placed.    Hemorrhagic shock (HCC)  Assessment & Plan  S/p IV fluid resuscitation and PRBCs  11/30 had EGD w/o sign of active bleed but did have duodenal ulcer gastritis and esophagitis.  Maintained large-bore peripheral IV access  Daily CBC  Transfuse as appropriate  12/1 Hgb:8.7    Anemia associated with acute blood loss- (present on  admission)  Assessment & Plan  Secondary to GI bleed  Getting 2 units of PRBCs in the ED  Daily CBC  Transfuse to goal greater than 7 or more aggressively should she become hypotensive again  S/P EGD with ulcers and gastritis/esophagitis noted.    History of Subarachnoid hemorrhage (HCC)- (present on admission)  Assessment & Plan  Avoid NSAIDs, ASA, anticoagulation  Likely secondary to a fall.    Malignant neoplasm of right female breast (HCC)  Assessment & Plan  Patient is s/p initial chemotherapy and radiation therapy  She has known mets to the lung  Prior MRI brain suspicious of brain mets  She is currently on Keytruda infusions every 6 weeks and followed by Dr. Clemens  She has a port    Tobacco use- (present on admission)  Assessment & Plan  Encourage cessation    Lactic acidosis - resolved- (present on admission)  Assessment & Plan  Significant lactic acidosis in the setting of hemorrhagic shock  Ongoing resuscitation  Resolved    Transaminitis- (present on admission)  Assessment & Plan  Hepatitis panel positive for hepatitis C antibody earlier this month.  Hepatitis C RNA negative on 11/29.   CT liver shows low density lesion in the dome of the liver, right hepatic subcentimeter lesions, small hiatal hernia.  Probably benign by LI-RADS.  Continue to trend LFTs    Essential hypertension- (present on admission)  Assessment & Plan  Continue active treatment with amldopine 10mg daily and metoprolol 25mg BID  Monitoring vitals.    Hypomagnesemia- (present on admission)  Assessment & Plan  Patient has a history of hypomagnesemia  Check and replace as appropriate    Hypokalemia- (present on admission)  Assessment & Plan  12/1 K:2.5  Replace IV and oral  Check magnesium and replace if appropriate  Repeat lab in a.m.    Bipolar 2 disorder (HCC)- (present on admission)  Assessment & Plan  Start prozac 20mg daily         VTE prophylaxis: pharmacologic prophylaxis contraindicated due to recent GIB    I have performed a  physical exam and reviewed and updated ROS and Plan today (12/4/2022). In review of yesterday's note (12/3/2022), there are no changes except as documented above.

## 2022-12-04 NOTE — THERAPY
Missed Therapy     Patient Name: Ashleigh Marquis  Age:  62 y.o., Sex:  female  Medical Record #: [id number]  Today's Date: 12/3/2022       12/03/22 1412   Interdisciplinary Plan of Care Collaboration   Collaboration Comments PT consult received. Attempted PT eval however pt declined stating that she was going on hospice and did not want to participate. Pt however was not able to clarify if she actually wanted to go on hospice and just told this PT that she did not want to participate. Will follow up 1x more to see if pt wants to participate, however will DC order if pt refuses again.     Marcello Huitron, PT, DPT

## 2022-12-05 NOTE — THERAPY
"Physical Therapy Contact Note    PT eval attempted. Patient was asking about her bagel, reported she will be \"out for a couple hours,\" and refused all attempts at OOB activity. Will re attempt eval 1x more prior to DCing order given patient appeared to be confused.    Gay Baird, PT, DPT  [phone]    "

## 2022-12-05 NOTE — PROGRESS NOTES
Assumed care of patient at 0645. Bedside report received. Assessment complete. Patient is hard of hearing. AA&Ox4. Denies CP/SOB. Room air. Reporting no pain. Declined intervention at this time.   Skin per flow sheets. Accessed R chest port running TKO.   Tolerating  diet. Denies N/V.  + void. + BM. Last BM 12/4  Pt ambulates with assistance of 2 to go from sitting to standing and a x1 for ambulation.   Educated on SCD use, patient declined at this time  Plan of care discussed, all questions answered.  Educated regarding importance of oral care. Oral care kit at bedside. Call light is within reach, treaded slipper socks on, bed in lowest/ locked position, hourly rounding in place, all needs met at this time.

## 2022-12-05 NOTE — THERAPY
"Occupational Therapy Contact Note    OT consult received and attempted. Pt refusing stating she is waiting for her bagel, will be \"out for a couple of hours\" and then will get up. Will attempt later as able.    Geni Fermin, OTR/L  "

## 2022-12-05 NOTE — CARE PLAN
The patient is Stable - Low risk of patient condition declining or worsening    Shift Goals  Clinical Goals: Pain control, maintain skin integrity  Patient Goals: Comfort  Family Goals: SHAI    Patient was assessed for pain. Patient was comfortable stated her pain is tolerable. Patient ambulated to the bathroom. Perineal care and barrier cream applied to red excoriated sacrum.    Progress made toward(s) clinical / shift goals:      Problem: Pain - Standard  Goal: Alleviation of pain or a reduction in pain to the patient’s comfort goal  Outcome: Progressing     Problem: Skin Integrity  Goal: Skin integrity is maintained or improved  Outcome: Progressing       Patient is not progressing towards the following goals:

## 2022-12-06 NOTE — CARE PLAN
The patient is Stable - Low risk of patient condition declining or worsening    Shift Goals  Clinical Goals: Comfort  Patient Goals: Sleep  Family Goals: no family present at this time    Patient medicated per MAR for 9/10 pain. Patient updated on plan of care. Patient was provided perineal care and barrier cream the red blanchable sacrum.    Progress made toward(s) clinical / shift goals:      Problem: Pain - Standard  Goal: Alleviation of pain or a reduction in pain to the patient’s comfort goal  Outcome: Progressing     Problem: Knowledge Deficit - Standard  Goal: Patient and family/care givers will demonstrate understanding of plan of care, disease process/condition, diagnostic tests and medications  Outcome: Progressing     Problem: Skin Integrity  Goal: Skin integrity is maintained or improved  Outcome: Progressing       Patient is not progressing towards the following goals:

## 2022-12-06 NOTE — CARE PLAN
The patient is Stable - Low risk of patient condition declining or worsening    Shift Goals  Clinical Goals: Comfort  Patient Goals: Sleep  Family Goals: no family present at this time    Progress made toward(s) clinical / shift goals:  pain well controlled at this time with PO pain medication. Discussed d/c options    Patient is not progressing towards the following goals:      Problem: Pain - Standard  Goal: Alleviation of pain or a reduction in pain to the patient’s comfort goal  Outcome: Progressing     Problem: Knowledge Deficit - Standard  Goal: Patient and family/care givers will demonstrate understanding of plan of care, disease process/condition, diagnostic tests and medications  Outcome: Progressing

## 2022-12-06 NOTE — HOSPICE
Call to daughter Katalina to schedule an appointment for a hospice discussion, no answer, message left with [facility] phone number.

## 2022-12-06 NOTE — PROGRESS NOTES
[facility] Medicine Daily Progress Note    Date of Service  12/5/2022    Chief Complaint  Ashleigh Marquis is a 62 y.o. female admitted 11/29/2022 with dark and tarry stool, weakness    Hospital Course  No notes on file    Ashleigh Marquis is a 62 y.o. female who presented 11/29/2022 with previous medical history of includes alcohol abuse, breast cancer with known mets to the lung stage IV, she reports that she is not drinking actively at this point, depression, bipolar disorder, chronic pain, hypertension, recent subarachnoid hemorrhage, and recent GI bleed.       Patient was just in the hospital from November 3 to 24.  She was found down out in community, and was brought in here hypothermic with a temperature 93 degrees core.  Imaging showed frontal subarachnoid hemorrhage, for which the patient was treated nonoperatively.  She also had an MSSA pneumonia for which she was treated with antibiotics.  During her hospital stay she had a seizure, and was placed on Keppra as a result.  Significantly her hospital stay was also complicated by upper GI bleed for which she underwent endoscopy on November 22.  She was found to have esophagitis with multiple gastric and duodenal ulcers.  She did not have any esophageal varices at that time.     Patient presents back today with complaint of upper GI pain, and bloody emesis.  Symptoms began this morning.  She is also had diarrhea, however this is chronic for her.  She does note some black bowel movements earlier today.  Here in the emergency room the, the patient was initially hypotensive with systolic in the 70s, and had a lactic acid of 7.  She has since been transfused of 2 units of PRBCs, and given fluids.  On my examination she has systolic in the 90s, with a heart rate in the 80s.  GI has been consulted.    CT liver shows low density lesion in the dome of the liver, right hepatic subcentimeter lesions, small hiatal hernia.  Probably benign by LI-RADS.    Status post  EGD showing duodenal ulcers.  GI recommended PPI twice daily, sucralfate.    Interval Problem Update  Patient was seen and examined at bedside.  I have personally reviewed and interpreted vitals, labs, and imaging.    12/2.  Afebrile.  Episode tachycardia has resolved.  Hypotension has resolved.  On room air.  Hemoglobin 9.2, platelets 94, replete potassium.  Denies fever, chills, chest pain, shortness of breath.  States he is hungry and wants some yogurt.  No longer having dark tarry stool but still having significant diarrhea.  Ordered C. difficile.  PT/OT consult.  12/3.  Afebrile.  Tachycardic this morning.  On room air.  Patient refused all of her a.m. meds this morning as well as morning labs.  Denies fevers, chills or chest pains, shortness of breath.  Denies abdominal pain.  Has been tolerating oral intake but does not like the food.  Patient tells me that she wants hospice and no longer wants blood draws and no only pain medication.  Patient is alert and oriented.  And did not feel she can be understands that she says she is still wants to get better and stronger.  She is very clear that she does not want resuscitation or intubation.  Placed DNR/DNI order.  Ordered end-of-life discussion hospice consult.  Goals of care discussion ongoing.  Patient does have a psychiatric history and gets irritated with multiple questions.  I did update her daughter Katalina about changed to DNR/DNI and hospice consult more so just for information so the patient can understand  12/4.  Afebrile.  Intermittent tachycardia and hypotension.  On room air.  Patient is again requesting hospice.  States she wants hospice to get on the right pain medicine.  When questioned further she states that she is not in pain and that her pain medicine is working.  She also states that she would like to get better.  She did take her medications this morning.  Awaiting hospice end-of-life consult.  More informational as I do not believe patient  understands what hospice is and her goals of care.  She does get irritable and asked to many questions.  12/5.  Afebrile.  Intermittent tachycardia.  On room air.  Denies fevers, chills, chest pain, shortness of breath.  Patient states she is in pain everywhere.  Reports she wants pain meds not work and she needs a stronger staff which is why she wants hospice.  The past few days patient was stating that she was comfortable and the pain meds were working.  She is not very clear that she knows she will not get better.  Has been declining medications, labs, PT/OT.  Request morphine and Ativan and that said.  Very clear that she now wants comfort care measures only.  Did discuss this with her daughter Katalina over the phone.  Started on comfort care measures only.    I have discussed this patient's plan of care and discharge plan at IDT rounds today with Case Management, Nursing, Nursing leadership, and other members of the IDT team.    Consultants/Specialty  GI    Code Status  Comfort Care/DNR    Disposition  Patient is not medically cleared for discharge.   Anticipate discharge to to skilled nursing facility.  I have placed the appropriate orders for post-discharge needs.    Review of Systems  Review of Systems   Unable to perform ROS: Psychiatric disorder   Constitutional:  Negative for chills and fever.   Respiratory:  Negative for cough and shortness of breath.    Cardiovascular:  Negative for chest pain, palpitations and leg swelling.   Gastrointestinal:  Negative for abdominal pain, constipation, diarrhea, melena (Resolved), nausea and vomiting.   Genitourinary:  Negative for dysuria, frequency and urgency.   Musculoskeletal:  Negative for falls.   Neurological:  Negative for weakness.   Psychiatric/Behavioral:  Negative for depression. The patient is not nervous/anxious.    All other systems reviewed and are negative.     Physical Exam  Temp:  [36.2 °C (97.1 °F)-36.9 °C (98.5 °F)] 36.2 °C (97.1 °F)  Pulse:   [80-96] 80  Resp:  [16-17] 17  BP: (103-128)/(62-79) 113/69  SpO2:  [95 %] 95 %    Physical Exam  Vitals and nursing note reviewed.   Constitutional:       General: She is not in acute distress.     Appearance: Normal appearance.   HENT:      Head: Normocephalic and atraumatic.      Right Ear: External ear normal.      Left Ear: External ear normal.      Nose: Nose normal.      Mouth/Throat:      Mouth: Mucous membranes are moist.      Pharynx: Oropharynx is clear.   Eyes:      Extraocular Movements: Extraocular movements intact.      Conjunctiva/sclera: Conjunctivae normal.   Cardiovascular:      Rate and Rhythm: Regular rhythm. Tachycardia present.      Pulses: Normal pulses.      Heart sounds: Normal heart sounds. No murmur heard.  Pulmonary:      Effort: Pulmonary effort is normal. No respiratory distress.      Breath sounds: Normal breath sounds. No stridor. No wheezing or rales.   Abdominal:      General: Abdomen is flat. Bowel sounds are normal. There is no distension.      Palpations: Abdomen is soft. There is no mass.      Tenderness: There is no abdominal tenderness.   Musculoskeletal:      Cervical back: Normal range of motion.      Comments: Right chest port   Skin:     General: Skin is warm.      Capillary Refill: Capillary refill takes less than 2 seconds.   Neurological:      General: No focal deficit present.      Mental Status: She is alert and oriented to person, place, and time. Mental status is at baseline.      Cranial Nerves: No cranial nerve deficit.   Psychiatric:         Mood and Affect: Mood normal.         Behavior: Behavior normal.       Fluids    Intake/Output Summary (Last 24 hours) at 12/5/2022 1926  Last data filed at 12/5/2022 1300  Gross per 24 hour   Intake 480 ml   Output --   Net 480 ml         Laboratory  Recent Labs     12/03/22  1123   WBC 8.6   RBC 2.97*   HEMOGLOBIN 9.4*   HEMATOCRIT 29.8*   .3*   MCH 31.6   MCHC 31.5*   RDW 76.3*   PLATELETCT 103*   MPV 11.0        Recent Labs     12/03/22  1123   SODIUM 138   POTASSIUM 3.1*   CHLORIDE 110   CO2 18*   GLUCOSE 85   BUN 9   CREATININE 0.54   CALCIUM 8.0*                       Imaging  CT-ABDOMEN LIVER FOR HEPATIC MASS (CIRRHOSIS)   Final Result   Addendum (preliminary) 1 of 1   The case was reviewed.      At least 2 of the 3 lesions described in the original report are new since    7/11/2022 study and represent new hepatic metastases. The larger    metastasis in the medial hepatic dome measures 1.4 cm.      Findings were discussed with Dr. Cummings on 12/1/2022 2:30 PM.      Final         1.  Low-density lesion in the dome of the liver which is partially filled in an isodense on delayed imaging. Additional low-density right hepatic subcentimeter lesions are seen which are nonenhancing and isodense on delayed phase imaging.   2.  Small hiatal hernia      LI-RADS: LR-2: probably benign      DX-CHEST-PORTABLE (1 VIEW)   Final Result      1.  No acute cardiac or pulmonary abnormalities are identified.             Assessment/Plan  * GI bleed- (present on admission)  Assessment & Plan  11/22/22 EGD: esophagitis and multiple gastric and duodenal ulcers  11/30/22 EGD:   POSTOPERATIVE DIAGNOSIS:   LA grade D esophagitis.  4cm hiatal hernia.  Gastritis.  Clean based pyloric channel ulcer.  Multiple large clean based duodenal ulcers.  No active bleeding.  GI consulting  Oral PPI BID  Sucralfate  Monitor CBC  Maintain good peripheral IV access and access her port  Advance diet  Discussed avoidance of NSAIDs, ASA, acidic foods/beverages, temperature hot foods and alcohol.    Advanced care planning/counseling discussion  Assessment & Plan  Patient was of sound mind and voluntarily participated in the conversation.  Patient was present for the conversation.  I did speak with her daughter over the phone.  I discussed advance care planning face to face with the patient and family for at least 30 minutes, including diagnosis, prognosis, plan  of care, risks and benefits of any therapies that could be offered, as well as alternatives including palliation and hospice, as appropriate.  Forms were completed and placed in the chart.      Patient requested comfort care measures only.  She is now very clear that she wants hospice and no she is not going to get better.  Once only Ativan and morphine and no other medications.  Does not want blood draws or therapy.  Started on comfort care measures only.    Hemorrhagic shock (HCC)  Assessment & Plan  S/p IV fluid resuscitation and PRBCs  11/30 had EGD w/o sign of active bleed but did have duodenal ulcer gastritis and esophagitis.  Maintained large-bore peripheral IV access  Daily CBC  Transfuse as appropriate  12/1 Hgb:8.7    Anemia associated with acute blood loss- (present on admission)  Assessment & Plan  Secondary to GI bleed  Getting 2 units of PRBCs in the ED  Daily CBC  Transfuse to goal greater than 7 or more aggressively should she become hypotensive again  S/P EGD with ulcers and gastritis/esophagitis noted.    History of Subarachnoid hemorrhage (HCC)- (present on admission)  Assessment & Plan  Avoid NSAIDs, ASA, anticoagulation  Likely secondary to a fall.    Malignant neoplasm of right female breast (HCC)  Assessment & Plan  Patient is s/p initial chemotherapy and radiation therapy  She has known mets to the lung  Prior MRI brain suspicious of brain mets  She is currently on Keytruda infusions every 6 weeks and followed by Dr. Clemens  She has a port    Tobacco use- (present on admission)  Assessment & Plan  Encourage cessation    Lactic acidosis - resolved- (present on admission)  Assessment & Plan  Significant lactic acidosis in the setting of hemorrhagic shock  Ongoing resuscitation  Resolved    Transaminitis- (present on admission)  Assessment & Plan  Hepatitis panel positive for hepatitis C antibody earlier this month.  Hepatitis C RNA negative on 11/29.   CT liver shows low density lesion in the  dome of the liver, right hepatic subcentimeter lesions, small hiatal hernia.  Probably benign by LI-RADS.  Continue to trend LFTs    Essential hypertension- (present on admission)  Assessment & Plan  Continue active treatment with amldopine 10mg daily and metoprolol 25mg BID  Monitoring vitals.    Hypomagnesemia- (present on admission)  Assessment & Plan  Patient has a history of hypomagnesemia  Check and replace as appropriate    Hypokalemia- (present on admission)  Assessment & Plan  12/1 K:2.5  Replace IV and oral  Check magnesium and replace if appropriate  Repeat lab in a.m.    Bipolar 2 disorder (HCC)- (present on admission)  Assessment & Plan  Start prozac 20mg daily         VTE prophylaxis: pharmacologic prophylaxis contraindicated due to recent GIB    I have performed a physical exam and reviewed and updated ROS and Plan today (12/5/2022). In review of yesterday's note (12/4/2022), there are no changes except as documented above.

## 2022-12-06 NOTE — DISCHARGE PLANNING
Agency/Facility Name: [facility]   Spoke To: García   Outcome: García notified DPA to send referral. García noted that pt can only be on comfort care at facility.     RN JUDI Marquis notified.

## 2022-12-07 NOTE — DISCHARGE PLANNING
Received Choice form at [phone]  Agency/Facility Name: [facility]   Referral sent per Choice form @ [phone]

## 2022-12-07 NOTE — CARE PLAN
The patient is Watcher - Medium risk of patient condition declining or worsening    Shift Goals  Clinical Goals: Pain and anxiety control / Comfort  Patient Goals: Pain and anxiety reduction / sleep  Family Goals: no family present at this time    Progress made toward(s) clinical / shift goals:    Problem: Knowledge Deficit - Comfort Care  Goal: Patient and family/care givers will demonstrate understanding of dying process and grieving  Outcome: Progressing     Problem: Psychosocial - Comfort Care  Goal: Patient's level of anxiety will decrease  Outcome: Progressing       Patient is not progressing towards the following goals:

## 2022-12-07 NOTE — DISCHARGE PLANNING
"Case Management Discharge Planning    Admission Date: 11/29/2022  GMLOS: 4.5  ALOS: 8    6-Clicks ADL Score: 18  6-Clicks Mobility Score: 18      Anticipated Discharge Dispo: Discharge Disposition: D/T to hospice home (50) (To Group Home w/ Hospice)    DME Needed: Yes    DME Ordered: No.  [facility] to order any needed DME.    Action(s) Taken: Updated Provider/Nurse on Discharge Plan, Choice obtained, and Referral(s) sent.    CM met w/ Pt -- Re: D/C Plan for Hospice -- Pt asked for CM to speak w/ her daughter Katalina.  CM contacted Pt's daughter: Katalina # [phone].  Katalina notified CM -- choice for Hospice is [facility].  Katalina stated, \"she has been in touch w/ [facility] & they have another meeting scheduled.  Pt's daughter denied any other concerns.      Escalations Completed: None    Medically Clear: No    Barriers to Discharge: Medical clearance and Pending Placement    Is the patient up for discharge tomorrow: No        "

## 2022-12-07 NOTE — PROGRESS NOTES
Pt fatigued and with complaints of pain to CNA. Upon assessment of pt, denies pain. Pain medication returned. Port to R chest CDI. Turning self frequently. Disoriented to time. Resting comfortably at this time. Call light within reach. BA on and sounding. Frequent assessment in place.

## 2022-12-07 NOTE — HOSPICE
Patient accepted to community hospice with [facility]. Daughter, Katalina, requested in-person meeting at 1PM on Friday to coordinate discharge and sign hospice consents. If patient does not return to [place], she will likely require a group home.    Please contact the hospice office if you have any questions [phone]

## 2022-12-07 NOTE — PROGRESS NOTES
[facility] Medicine Daily Progress Note    Date of Service  12/6/2022    Chief Complaint  Ashleigh Marquis is a 62 y.o. female admitted 11/29/2022 with dark and tarry stool, weakness    Hospital Course  No notes on file    Ashleigh Marquis is a 62 y.o. female who presented 11/29/2022 with previous medical history of includes alcohol abuse, breast cancer with known mets to the lung stage IV, she reports that she is not drinking actively at this point, depression, bipolar disorder, chronic pain, hypertension, recent subarachnoid hemorrhage, and recent GI bleed.       Patient was just in the hospital from November 3 to 24.  She was found down out in community, and was brought in here hypothermic with a temperature 93 degrees core.  Imaging showed frontal subarachnoid hemorrhage, for which the patient was treated nonoperatively.  She also had an MSSA pneumonia for which she was treated with antibiotics.  During her hospital stay she had a seizure, and was placed on Keppra as a result.  Significantly her hospital stay was also complicated by upper GI bleed for which she underwent endoscopy on November 22.  She was found to have esophagitis with multiple gastric and duodenal ulcers.  She did not have any esophageal varices at that time.     Patient presents back today with complaint of upper GI pain, and bloody emesis.  Symptoms began this morning.  She is also had diarrhea, however this is chronic for her.  She does note some black bowel movements earlier today.  Here in the emergency room the, the patient was initially hypotensive with systolic in the 70s, and had a lactic acid of 7.  She has since been transfused of 2 units of PRBCs, and given fluids.  On my examination she has systolic in the 90s, with a heart rate in the 80s.  GI has been consulted.    CT liver shows low density lesion in the dome of the liver, right hepatic subcentimeter lesions, small hiatal hernia.  Probably benign by LI-RADS.    Status post  EGD showing duodenal ulcers.  GI recommended PPI twice daily, sucralfate.    Interval Problem Update  Patient was seen and examined at bedside.  I have personally reviewed and interpreted vitals, labs, and imaging.    12/2.  Afebrile.  Episode tachycardia has resolved.  Hypotension has resolved.  On room air.  Hemoglobin 9.2, platelets 94, replete potassium.  Denies fever, chills, chest pain, shortness of breath.  States he is hungry and wants some yogurt.  No longer having dark tarry stool but still having significant diarrhea.  Ordered C. difficile.  PT/OT consult.  12/3.  Afebrile.  Tachycardic this morning.  On room air.  Patient refused all of her a.m. meds this morning as well as morning labs.  Denies fevers, chills or chest pains, shortness of breath.  Denies abdominal pain.  Has been tolerating oral intake but does not like the food.  Patient tells me that she wants hospice and no longer wants blood draws and no only pain medication.  Patient is alert and oriented.  And did not feel she can be understands that she says she is still wants to get better and stronger.  She is very clear that she does not want resuscitation or intubation.  Placed DNR/DNI order.  Ordered end-of-life discussion hospice consult.  Goals of care discussion ongoing.  Patient does have a psychiatric history and gets irritated with multiple questions.  I did update her daughter Katalina about changed to DNR/DNI and hospice consult more so just for information so the patient can understand  12/4.  Afebrile.  Intermittent tachycardia and hypotension.  On room air.  Patient is again requesting hospice.  States she wants hospice to get on the right pain medicine.  When questioned further she states that she is not in pain and that her pain medicine is working.  She also states that she would like to get better.  She did take her medications this morning.  Awaiting hospice end-of-life consult.  More informational as I do not believe patient  understands what hospice is and her goals of care.  She does get irritable and asked to many questions.  12/5.  Afebrile.  Intermittent tachycardia.  On room air.  Denies fevers, chills, chest pain, shortness of breath.  Patient states she is in pain everywhere.  Reports she wants pain meds not work and she needs a stronger staff which is why she wants hospice.  The past few days patient was stating that she was comfortable and the pain meds were working.  She is not very clear that she knows she will not get better.  Has been declining medications, labs, PT/OT.  Request morphine and Ativan and that said.  Very clear that she now wants comfort care measures only.  Did discuss this with her daughter Katalina over the phone.  Started on comfort care measures only.  12/6.  Afebrile.  Stable vitals.  On room air.  Patient has fever, chills, shortness of breath.  Patient states her pain is well controlled but still wants more pain meds.  [facility] is following.  Continue comfort care measures only.    I have discussed this patient's plan of care and discharge plan at IDT rounds today with Case Management, Nursing, Nursing leadership, and other members of the IDT team.    Consultants/Specialty  GI    Code Status  Comfort Care/DNR    Disposition  Patient is not medically cleared for discharge.   Anticipate discharge to to skilled nursing facility.  I have placed the appropriate orders for post-discharge needs.    Review of Systems  Review of Systems   Unable to perform ROS: Psychiatric disorder   Constitutional:  Negative for chills and fever.   Respiratory:  Negative for cough and shortness of breath.    Cardiovascular:  Negative for chest pain, palpitations and leg swelling.   Gastrointestinal:  Negative for abdominal pain, constipation, diarrhea, melena (Resolved), nausea and vomiting.   Genitourinary:  Negative for dysuria, frequency and urgency.   Musculoskeletal:  Negative for falls.   Neurological:  Negative  for weakness.   Psychiatric/Behavioral:  Negative for depression. The patient is not nervous/anxious.    All other systems reviewed and are negative.     Physical Exam  Temp:  [36.7 °C (98.1 °F)] 36.7 °C (98.1 °F)  Pulse:  [99] 99  Resp:  [15] 15  BP: (100)/(67) 100/67  SpO2:  [94 %] 94 %    Physical Exam  Vitals and nursing note reviewed.   Constitutional:       General: She is not in acute distress.     Appearance: Normal appearance.   HENT:      Head: Normocephalic and atraumatic.      Right Ear: External ear normal.      Left Ear: External ear normal.      Nose: Nose normal.      Mouth/Throat:      Mouth: Mucous membranes are moist.      Pharynx: Oropharynx is clear.   Eyes:      Extraocular Movements: Extraocular movements intact.      Conjunctiva/sclera: Conjunctivae normal.   Cardiovascular:      Rate and Rhythm: Regular rhythm. Tachycardia present.      Pulses: Normal pulses.      Heart sounds: Normal heart sounds. No murmur heard.  Pulmonary:      Effort: Pulmonary effort is normal. No respiratory distress.      Breath sounds: Normal breath sounds. No stridor. No wheezing or rales.   Abdominal:      General: Abdomen is flat. Bowel sounds are normal. There is no distension.      Palpations: Abdomen is soft. There is no mass.      Tenderness: There is no abdominal tenderness.   Musculoskeletal:      Cervical back: Normal range of motion.      Comments: Right chest port   Skin:     General: Skin is warm.      Capillary Refill: Capillary refill takes less than 2 seconds.   Neurological:      General: No focal deficit present.      Mental Status: She is alert and oriented to person, place, and time. Mental status is at baseline.      Cranial Nerves: No cranial nerve deficit.   Psychiatric:         Mood and Affect: Mood normal.         Behavior: Behavior normal.       Fluids  No intake or output data in the 24 hours ending 12/06/22 5014      Laboratory                              Imaging  CT-ABDOMEN LIVER FOR  HEPATIC MASS (CIRRHOSIS)   Final Result   Addendum (preliminary) 1 of 1   The case was reviewed.      At least 2 of the 3 lesions described in the original report are new since    7/11/2022 study and represent new hepatic metastases. The larger    metastasis in the medial hepatic dome measures 1.4 cm.      Findings were discussed with Dr. Cummings on 12/1/2022 2:30 PM.      Final         1.  Low-density lesion in the dome of the liver which is partially filled in an isodense on delayed imaging. Additional low-density right hepatic subcentimeter lesions are seen which are nonenhancing and isodense on delayed phase imaging.   2.  Small hiatal hernia      LI-RADS: LR-2: probably benign      DX-CHEST-PORTABLE (1 VIEW)   Final Result      1.  No acute cardiac or pulmonary abnormalities are identified.             Assessment/Plan  * GI bleed- (present on admission)  Assessment & Plan  11/22/22 EGD: esophagitis and multiple gastric and duodenal ulcers  11/30/22 EGD:   POSTOPERATIVE DIAGNOSIS:   LA grade D esophagitis.  4cm hiatal hernia.  Gastritis.  Clean based pyloric channel ulcer.  Multiple large clean based duodenal ulcers.  No active bleeding.  GI consulting  Oral PPI BID  Sucralfate  Monitor CBC  Maintain good peripheral IV access and access her port  Advance diet  Discussed avoidance of NSAIDs, ASA, acidic foods/beverages, temperature hot foods and alcohol.    Comfort care    Advanced care planning/counseling discussion  Assessment & Plan  Patient was of sound mind and voluntarily participated in the conversation.  Patient was present for the conversation.  I did speak with her daughter over the phone.  I discussed advance care planning face to face with the patient and family for at least 30 minutes, including diagnosis, prognosis, plan of care, risks and benefits of any therapies that could be offered, as well as alternatives including palliation and hospice, as appropriate.  Forms were completed and placed in the  chart.      Patient requested comfort care measures only.  She is now very clear that she wants hospice and no she is not going to get better.  Once only Ativan and morphine and no other medications.  Does not want blood draws or therapy.  Started on comfort care measures only.    Hemorrhagic shock (HCC)  Assessment & Plan  S/p IV fluid resuscitation and PRBCs  11/30 had EGD w/o sign of active bleed but did have duodenal ulcer gastritis and esophagitis.  Maintained large-bore peripheral IV access  Daily CBC  Transfuse as appropriate  12/1 Hgb:8.7    Comfort care    Anemia associated with acute blood loss- (present on admission)  Assessment & Plan  Secondary to GI bleed  Getting 2 units of PRBCs in the ED  Daily CBC  Transfuse to goal greater than 7 or more aggressively should she become hypotensive again  S/P EGD with ulcers and gastritis/esophagitis noted.    Comfort care    History of Subarachnoid hemorrhage (HCC)- (present on admission)  Assessment & Plan  Avoid NSAIDs, ASA, anticoagulation  Likely secondary to a fall.    Comfort care    Malignant neoplasm of right female breast (HCC)  Assessment & Plan  Patient is s/p initial chemotherapy and radiation therapy  She has known mets to the lung  Prior MRI brain suspicious of brain mets  She is currently on Keytruda infusions every 6 weeks and followed by Dr. Clemens  She has a port    Comfort care    Tobacco use- (present on admission)  Assessment & Plan  Encourage cessation    Comfort care    Lactic acidosis - resolved- (present on admission)  Assessment & Plan  Significant lactic acidosis in the setting of hemorrhagic shock  Ongoing resuscitation  Resolved    Comfort care    Transaminitis- (present on admission)  Assessment & Plan  Hepatitis panel positive for hepatitis C antibody earlier this month.  Hepatitis C RNA negative on 11/29.   CT liver shows low density lesion in the dome of the liver, right hepatic subcentimeter lesions, small hiatal hernia.  Probably  benign by LI-RADS.  Continue to trend LFTs    Comfort care    Essential hypertension- (present on admission)  Assessment & Plan  Continue active treatment with amldopine 10mg daily and metoprolol 25mg BID  Monitoring vitals.    Comfort care    Hypomagnesemia- (present on admission)  Assessment & Plan  Patient has a history of hypomagnesemia  Check and replace as appropriate    Comfort care    Hypokalemia- (present on admission)  Assessment & Plan  12/1 K:2.5  Replace IV and oral  Check magnesium and replace if appropriate  Repeat lab in a.m.    Comfort care    Bipolar 2 disorder (HCC)- (present on admission)  Assessment & Plan  Start prozac 20mg daily    Comfort care       VTE prophylaxis: pharmacologic prophylaxis contraindicated due to recent GIB    I have performed a physical exam and reviewed and updated ROS and Plan today (12/6/2022). In review of yesterday's note (12/5/2022), there are no changes except as documented above.

## 2022-12-07 NOTE — CARE PLAN
The patient is Watcher - Medium risk of patient condition declining or worsening    Shift Goals  Clinical Goals: Pt will remain free from injury  Patient Goals: Pt will continue to verbalized tolerable pain.  Family Goals: not present    Progress made toward(s) clinical / shift goals:  Pt up with one person assist to the bedside commode. Calling for assistance. BA on and sounding. Medicated for pain x 1. Pt denied pain this morning. Sleeping comfortably for majority of the shift.       Problem: Pain - Standard  Goal: Alleviation of pain or a reduction in pain to the patient’s comfort goal  12/7/2022 1002 by Maria G Ramires R.N.  Outcome: Progressing  12/7/2022 1002 by Maria G Ramires R.N.  Outcome: Progressing     Problem: Knowledge Deficit - Standard  Goal: Patient and family/care givers will demonstrate understanding of plan of care, disease process/condition, diagnostic tests and medications  12/7/2022 1002 by Maria G Ramires R.N.  Outcome: Progressing  12/7/2022 1002 by Maria G Ramires R.N.  Outcome: Progressing     Problem: Skin Integrity  Goal: Skin integrity is maintained or improved  12/7/2022 1002 by Maria G Ramires R.N.  Outcome: Progressing  12/7/2022 1002 by Maria G Ramires R.N.  Outcome: Progressing     Problem: Fall Risk  Goal: Patient will remain free from falls  12/7/2022 1002 by Maria G Ramires R.N.  Outcome: Progressing  12/7/2022 1002 by Maria G Ramires R.N.  Outcome: Progressing     Problem: Bowel Elimination  Goal: Establish and maintain regular bowel function  Outcome: Progressing     Problem: Knowledge Deficit - Comfort Care  Goal: Patient and family/care givers will demonstrate understanding of dying process and grieving  12/7/2022 1002 by Maria G Ramires R.N.  Outcome: Progressing  12/7/2022 1002 by Maria G Ramires R.N.  Outcome: Progressing     Problem: Psychosocial - Comfort Care  Goal: Patient's level of anxiety will  decrease  Outcome: Progressing     Patient is not progressing towards the following goals:

## 2022-12-08 NOTE — CARE PLAN
The patient is Watcher - Medium risk of patient condition declining or worsening    Shift Goals  Clinical Goals: Comfort / Remain free of falls  Patient Goals: Pain Relief and sleep  Family Goals: not present    Progress made toward(s) clinical / shift goals:    Problem: Pain - Standard  Goal: Alleviation of pain or a reduction in pain to the patient’s comfort goal  Outcome: Progressing     Problem: Fall Risk  Goal: Patient will remain free from falls  Outcome: Progressing     Problem: Psychosocial - Comfort Care  Goal: Patient's level of anxiety will decrease  Outcome: Progressing       Patient is not progressing towards the following goals:

## 2022-12-08 NOTE — PROGRESS NOTES
[facility] Medicine Daily Progress Note    Date of Service  12/7/2022    Chief Complaint  Ashleigh Marquis is a 62 y.o. female admitted 11/29/2022 with dark and tarry stool, weakness    Hospital Course  No notes on file    Ashleigh Marquis is a 62 y.o. female who presented 11/29/2022 with previous medical history of includes alcohol abuse, breast cancer with known mets to the lung stage IV, she reports that she is not drinking actively at this point, depression, bipolar disorder, chronic pain, hypertension, recent subarachnoid hemorrhage, and recent GI bleed.       Patient was just in the hospital from November 3 to 24.  She was found down out in community, and was brought in here hypothermic with a temperature 93 degrees core.  Imaging showed frontal subarachnoid hemorrhage, for which the patient was treated nonoperatively.  She also had an MSSA pneumonia for which she was treated with antibiotics.  During her hospital stay she had a seizure, and was placed on Keppra as a result.  Significantly her hospital stay was also complicated by upper GI bleed for which she underwent endoscopy on November 22.  She was found to have esophagitis with multiple gastric and duodenal ulcers.  She did not have any esophageal varices at that time.     Patient presents back today with complaint of upper GI pain, and bloody emesis.  Symptoms began this morning.  She is also had diarrhea, however this is chronic for her.  She does note some black bowel movements earlier today.  Here in the emergency room the, the patient was initially hypotensive with systolic in the 70s, and had a lactic acid of 7.  She has since been transfused of 2 units of PRBCs, and given fluids.  On my examination she has systolic in the 90s, with a heart rate in the 80s.  GI has been consulted.    CT liver shows low density lesion in the dome of the liver, right hepatic subcentimeter lesions, small hiatal hernia.  Probably benign by LI-RADS.    Status post  EGD showing duodenal ulcers.  GI recommended PPI twice daily, sucralfate.    Interval Problem Update  Patient was seen and examined at bedside.  I have personally reviewed and interpreted vitals, labs, and imaging.    12/2.  Afebrile.  Episode tachycardia has resolved.  Hypotension has resolved.  On room air.  Hemoglobin 9.2, platelets 94, replete potassium.  Denies fever, chills, chest pain, shortness of breath.  States he is hungry and wants some yogurt.  No longer having dark tarry stool but still having significant diarrhea.  Ordered C. difficile.  PT/OT consult.  12/3.  Afebrile.  Tachycardic this morning.  On room air.  Patient refused all of her a.m. meds this morning as well as morning labs.  Denies fevers, chills or chest pains, shortness of breath.  Denies abdominal pain.  Has been tolerating oral intake but does not like the food.  Patient tells me that she wants hospice and no longer wants blood draws and no only pain medication.  Patient is alert and oriented.  And did not feel she can be understands that she says she is still wants to get better and stronger.  She is very clear that she does not want resuscitation or intubation.  Placed DNR/DNI order.  Ordered end-of-life discussion hospice consult.  Goals of care discussion ongoing.  Patient does have a psychiatric history and gets irritated with multiple questions.  I did update her daughter Katalina about changed to DNR/DNI and hospice consult more so just for information so the patient can understand  12/4.  Afebrile.  Intermittent tachycardia and hypotension.  On room air.  Patient is again requesting hospice.  States she wants hospice to get on the right pain medicine.  When questioned further she states that she is not in pain and that her pain medicine is working.  She also states that she would like to get better.  She did take her medications this morning.  Awaiting hospice end-of-life consult.  More informational as I do not believe patient  understands what hospice is and her goals of care.  She does get irritable and asked to many questions.  12/5.  Afebrile.  Intermittent tachycardia.  On room air.  Denies fevers, chills, chest pain, shortness of breath.  Patient states she is in pain everywhere.  Reports she wants pain meds not work and she needs a stronger staff which is why she wants hospice.  The past few days patient was stating that she was comfortable and the pain meds were working.  She is not very clear that she knows she will not get better.  Has been declining medications, labs, PT/OT.  Request morphine and Ativan and that said.  Very clear that she now wants comfort care measures only.  Did discuss this with her daughter Katalina over the phone.  Started on comfort care measures only.  12/6.  Afebrile.  Stable vitals.  On room air.  Patient has fever, chills, shortness of breath.  Patient states her pain is well controlled but still wants more pain meds.  [facility] is following.  Continue comfort care measures only.  12/7.  Afebrile.  Has been tachycardic.  Hypotensive this morning.  On room air.  Continue morphine and Ativan.  Continue comfort care measures only.  Accepted by [facility].    I have discussed this patient's plan of care and discharge plan at IDT rounds today with Case Management, Nursing, Nursing leadership, and other members of the IDT team.    Consultants/Specialty  GI    Code Status  Comfort Care/DNR    Disposition  Patient is not medically cleared for discharge.   Anticipate discharge to to skilled nursing facility.  I have placed the appropriate orders for post-discharge needs.    Review of Systems  Review of Systems   Unable to perform ROS: Psychiatric disorder   Constitutional:  Negative for chills and fever.   Respiratory:  Negative for cough and shortness of breath.    Cardiovascular:  Negative for chest pain, palpitations and leg swelling.   Gastrointestinal:  Negative for abdominal pain, constipation,  diarrhea, melena (Resolved), nausea and vomiting.   Genitourinary:  Negative for dysuria, frequency and urgency.   Musculoskeletal:  Negative for falls.   Neurological:  Negative for weakness.   Psychiatric/Behavioral:  Negative for depression. The patient is not nervous/anxious.    All other systems reviewed and are negative.     Physical Exam  Temp:  [36.4 °C (97.6 °F)] 36.4 °C (97.6 °F)  Pulse:  [106] 106  Resp:  [14] 14  BP: (91)/(64) 91/64  SpO2:  [97 %] 97 %    Physical Exam  Vitals and nursing note reviewed.   Constitutional:       General: She is not in acute distress.     Appearance: Normal appearance.   HENT:      Head: Normocephalic and atraumatic.      Right Ear: External ear normal.      Left Ear: External ear normal.      Nose: Nose normal.      Mouth/Throat:      Mouth: Mucous membranes are moist.      Pharynx: Oropharynx is clear.   Eyes:      Extraocular Movements: Extraocular movements intact.      Conjunctiva/sclera: Conjunctivae normal.   Cardiovascular:      Rate and Rhythm: Regular rhythm. Tachycardia present.      Pulses: Normal pulses.      Heart sounds: Normal heart sounds. No murmur heard.  Pulmonary:      Effort: Pulmonary effort is normal. No respiratory distress.      Breath sounds: Normal breath sounds. No stridor. No wheezing or rales.   Abdominal:      General: Abdomen is flat. Bowel sounds are normal. There is no distension.      Palpations: Abdomen is soft. There is no mass.      Tenderness: There is no abdominal tenderness.   Musculoskeletal:      Cervical back: Normal range of motion.      Comments: Right chest port   Skin:     General: Skin is warm.      Capillary Refill: Capillary refill takes less than 2 seconds.   Neurological:      General: No focal deficit present.      Mental Status: She is alert and oriented to person, place, and time. Mental status is at baseline.      Cranial Nerves: No cranial nerve deficit.   Psychiatric:         Mood and Affect: Mood normal.          Behavior: Behavior normal.       Fluids    Intake/Output Summary (Last 24 hours) at 12/7/2022 1842  Last data filed at 12/7/2022 0939  Gross per 24 hour   Intake 100 ml   Output --   Net 100 ml         Laboratory                              Imaging  CT-ABDOMEN LIVER FOR HEPATIC MASS (CIRRHOSIS)   Final Result   Addendum (preliminary) 1 of 1   The case was reviewed.      At least 2 of the 3 lesions described in the original report are new since    7/11/2022 study and represent new hepatic metastases. The larger    metastasis in the medial hepatic dome measures 1.4 cm.      Findings were discussed with Dr. Cummings on 12/1/2022 2:30 PM.      Final         1.  Low-density lesion in the dome of the liver which is partially filled in an isodense on delayed imaging. Additional low-density right hepatic subcentimeter lesions are seen which are nonenhancing and isodense on delayed phase imaging.   2.  Small hiatal hernia      LI-RADS: LR-2: probably benign      DX-CHEST-PORTABLE (1 VIEW)   Final Result      1.  No acute cardiac or pulmonary abnormalities are identified.             Assessment/Plan  * GI bleed- (present on admission)  Assessment & Plan  11/22/22 EGD: esophagitis and multiple gastric and duodenal ulcers  11/30/22 EGD:   POSTOPERATIVE DIAGNOSIS:   LA grade D esophagitis.  4cm hiatal hernia.  Gastritis.  Clean based pyloric channel ulcer.  Multiple large clean based duodenal ulcers.  No active bleeding.  GI consulting  Oral PPI BID  Sucralfate  Monitor CBC  Maintain good peripheral IV access and access her port  Advance diet  Discussed avoidance of NSAIDs, ASA, acidic foods/beverages, temperature hot foods and alcohol.    Comfort care    Advanced care planning/counseling discussion  Assessment & Plan  Patient was of sound mind and voluntarily participated in the conversation.  Patient was present for the conversation.  I did speak with her daughter over the phone.  I discussed advance care planning face to face  with the patient and family for at least 30 minutes, including diagnosis, prognosis, plan of care, risks and benefits of any therapies that could be offered, as well as alternatives including palliation and hospice, as appropriate.  Forms were completed and placed in the chart.      Patient requested comfort care measures only.  She is now very clear that she wants hospice and no she is not going to get better.  Once only Ativan and morphine and no other medications.  Does not want blood draws or therapy.  Started on comfort care measures only.    Hemorrhagic shock (HCC)  Assessment & Plan  S/p IV fluid resuscitation and PRBCs  11/30 had EGD w/o sign of active bleed but did have duodenal ulcer gastritis and esophagitis.  Maintained large-bore peripheral IV access  Daily CBC  Transfuse as appropriate  12/1 Hgb:8.7    Comfort care    Anemia associated with acute blood loss- (present on admission)  Assessment & Plan  Secondary to GI bleed  Getting 2 units of PRBCs in the ED  Daily CBC  Transfuse to goal greater than 7 or more aggressively should she become hypotensive again  S/P EGD with ulcers and gastritis/esophagitis noted.    Comfort care    History of Subarachnoid hemorrhage (HCC)- (present on admission)  Assessment & Plan  Avoid NSAIDs, ASA, anticoagulation  Likely secondary to a fall.    Comfort care    Malignant neoplasm of right female breast (HCC)  Assessment & Plan  Patient is s/p initial chemotherapy and radiation therapy  She has known mets to the lung  Prior MRI brain suspicious of brain mets  She is currently on Keytruda infusions every 6 weeks and followed by Dr. Clemens  She has a port    Comfort care    Tobacco use- (present on admission)  Assessment & Plan  Encourage cessation    Comfort care    Lactic acidosis - resolved- (present on admission)  Assessment & Plan  Significant lactic acidosis in the setting of hemorrhagic shock  Ongoing resuscitation  Resolved    Comfort care    Transaminitis-  (present on admission)  Assessment & Plan  Hepatitis panel positive for hepatitis C antibody earlier this month.  Hepatitis C RNA negative on 11/29.   CT liver shows low density lesion in the dome of the liver, right hepatic subcentimeter lesions, small hiatal hernia.  Probably benign by LI-RADS.  Continue to trend LFTs    Comfort care    Essential hypertension- (present on admission)  Assessment & Plan  Continue active treatment with amldopine 10mg daily and metoprolol 25mg BID  Monitoring vitals.    Comfort care    Hypomagnesemia- (present on admission)  Assessment & Plan  Patient has a history of hypomagnesemia  Check and replace as appropriate    Comfort care    Hypokalemia- (present on admission)  Assessment & Plan  12/1 K:2.5  Replace IV and oral  Check magnesium and replace if appropriate  Repeat lab in a.m.    Comfort care    Bipolar 2 disorder (HCC)- (present on admission)  Assessment & Plan  Start prozac 20mg daily    Comfort care         VTE prophylaxis: pharmacologic prophylaxis contraindicated due to recent GIB    I have performed a physical exam and reviewed and updated ROS and Plan today (12/7/2022). In review of yesterday's note (12/6/2022), there are no changes except as documented above.

## 2022-12-08 NOTE — CARE PLAN
The patient is Watcher - Medium risk of patient condition declining or worsening    Shift Goals  Clinical Goals: Pt will verbalize pain of less than a 4  Patient Goals: Pt will be able to sleep comfortably.  Family Goals: not present    Progress made toward(s) clinical / shift goals:  Pt states pain is well controlled on current regimen. Sleeping frequently. Frequent assessment in place.       Problem: Pain - Standard  Goal: Alleviation of pain or a reduction in pain to the patient’s comfort goal  Outcome: Progressing     Problem: Knowledge Deficit - Standard  Goal: Patient and family/care givers will demonstrate understanding of plan of care, disease process/condition, diagnostic tests and medications  Outcome: Progressing     Problem: Skin Integrity  Goal: Skin integrity is maintained or improved  Outcome: Progressing     Problem: Fall Risk  Goal: Patient will remain free from falls  Outcome: Progressing     Problem: Bowel Elimination  Goal: Establish and maintain regular bowel function  Outcome: Progressing     Problem: Knowledge Deficit - Comfort Care  Goal: Patient and family/care givers will demonstrate understanding of dying process and grieving  Outcome: Progressing     Problem: Psychosocial - Comfort Care  Goal: Patient's level of anxiety will decrease  Outcome: Progressing       Patient is not progressing towards the following goals:

## 2022-12-08 NOTE — PROGRESS NOTES
Pt alert but disoriented to time. Up with one to the BSC. Port to R chest flushing well with positive blood return. Fluids at tko. [place] IR in use for pain. Pt sleeping. BA on and sounding. Call light within reach. Hourly rounding in place.

## 2022-12-09 NOTE — PROGRESS NOTES
Pt alert but disoriented to time. Denies pain or nausea at this time. Port to R chest heparin locked. Incontinent of stool. Up with one to the bedside commode. Poor po intake. Pt resting comfortably at this time. BA on and sounding. Call light within reach. Hourly rounding in place.

## 2022-12-09 NOTE — CARE PLAN
"The patient is Watcher - Medium risk of patient condition declining or worsening    Shift Goals  Clinical Goals: Pain and anxiety control to achieve comfort  Patient Goals: \"Popsicles and sleep\"  Family Goals: not present    Progress made toward(s) clinical / shift goals:    Problem: Knowledge Deficit - Comfort Care  Goal: Patient and family/care givers will demonstrate understanding of dying process and grieving  Outcome: Progressing     Problem: Psychosocial - Comfort Care  Goal: Patient's level of anxiety will decrease  Outcome: Progressing  Goal: Patient and family will demonstrate ability to cope with life altering diagnosis and/or procedure  Outcome: Progressing       Patient is not progressing towards the following goals:      "

## 2022-12-09 NOTE — CARE PLAN
The patient is Watcher - Medium risk of patient condition declining or worsening    Shift Goals  Clinical Goals: Pt will remain free from injury  Patient Goals: Pt will have otter pops  Family Goals: not present    Progress made toward(s) clinical / shift goals:  ***      Problem: Pain - Standard  Goal: Alleviation of pain or a reduction in pain to the patient’s comfort goal  Outcome: Progressing     Problem: Knowledge Deficit - Standard  Goal: Patient and family/care givers will demonstrate understanding of plan of care, disease process/condition, diagnostic tests and medications  Outcome: Progressing     Problem: Skin Integrity  Goal: Skin integrity is maintained or improved  Outcome: Progressing     Problem: Bowel Elimination  Goal: Establish and maintain regular bowel function  Outcome: Progressing       Patient is not progressing towards the following goals:

## 2022-12-09 NOTE — PROGRESS NOTES
[facility] Medicine Daily Progress Note    Date of Service  12/8/2022    Chief Complaint  Ashleigh Marquis is a 62 y.o. female admitted 11/29/2022 with dark and tarry stool, weakness    Hospital Course  No notes on file    Ashleigh Marquis is a 62 y.o. female who presented 11/29/2022 with previous medical history of includes alcohol abuse, breast cancer with known mets to the lung stage IV, she reports that she is not drinking actively at this point, depression, bipolar disorder, chronic pain, hypertension, recent subarachnoid hemorrhage, and recent GI bleed.       Patient was just in the hospital from November 3 to 24.  She was found down out in community, and was brought in here hypothermic with a temperature 93 degrees core.  Imaging showed frontal subarachnoid hemorrhage, for which the patient was treated nonoperatively.  She also had an MSSA pneumonia for which she was treated with antibiotics.  During her hospital stay she had a seizure, and was placed on Keppra as a result.  Significantly her hospital stay was also complicated by upper GI bleed for which she underwent endoscopy on November 22.  She was found to have esophagitis with multiple gastric and duodenal ulcers.  She did not have any esophageal varices at that time.     Patient presents back today with complaint of upper GI pain, and bloody emesis.  Symptoms began this morning.  She is also had diarrhea, however this is chronic for her.  She does note some black bowel movements earlier today.  Here in the emergency room the, the patient was initially hypotensive with systolic in the 70s, and had a lactic acid of 7.  She has since been transfused of 2 units of PRBCs, and given fluids.  On my examination she has systolic in the 90s, with a heart rate in the 80s.  GI has been consulted.    CT liver shows low density lesion in the dome of the liver, right hepatic subcentimeter lesions, small hiatal hernia.  Probably benign by LI-RADS.    Status post  EGD showing duodenal ulcers.  GI recommended PPI twice daily, sucralfate.    Interval Problem Update  Patient was seen and examined at bedside.  I have personally reviewed and interpreted vitals, labs, and imaging.    12/2.  Afebrile.  Episode tachycardia has resolved.  Hypotension has resolved.  On room air.  Hemoglobin 9.2, platelets 94, replete potassium.  Denies fever, chills, chest pain, shortness of breath.  States he is hungry and wants some yogurt.  No longer having dark tarry stool but still having significant diarrhea.  Ordered C. difficile.  PT/OT consult.  12/3.  Afebrile.  Tachycardic this morning.  On room air.  Patient refused all of her a.m. meds this morning as well as morning labs.  Denies fevers, chills or chest pains, shortness of breath.  Denies abdominal pain.  Has been tolerating oral intake but does not like the food.  Patient tells me that she wants hospice and no longer wants blood draws and no only pain medication.  Patient is alert and oriented.  And did not feel she can be understands that she says she is still wants to get better and stronger.  She is very clear that she does not want resuscitation or intubation.  Placed DNR/DNI order.  Ordered end-of-life discussion hospice consult.  Goals of care discussion ongoing.  Patient does have a psychiatric history and gets irritated with multiple questions.  I did update her daughter Katalina about changed to DNR/DNI and hospice consult more so just for information so the patient can understand  12/4.  Afebrile.  Intermittent tachycardia and hypotension.  On room air.  Patient is again requesting hospice.  States she wants hospice to get on the right pain medicine.  When questioned further she states that she is not in pain and that her pain medicine is working.  She also states that she would like to get better.  She did take her medications this morning.  Awaiting hospice end-of-life consult.  More informational as I do not believe patient  understands what hospice is and her goals of care.  She does get irritable and asked to many questions.  12/5.  Afebrile.  Intermittent tachycardia.  On room air.  Denies fevers, chills, chest pain, shortness of breath.  Patient states she is in pain everywhere.  Reports she wants pain meds not work and she needs a stronger staff which is why she wants hospice.  The past few days patient was stating that she was comfortable and the pain meds were working.  She is not very clear that she knows she will not get better.  Has been declining medications, labs, PT/OT.  Request morphine and Ativan and that said.  Very clear that she now wants comfort care measures only.  Did discuss this with her daughter Katalina over the phone.  Started on comfort care measures only.  12/6.  Afebrile.  Stable vitals.  On room air.  Patient has fever, chills, shortness of breath.  Patient states her pain is well controlled but still wants more pain meds.  [facility] is following.  Continue comfort care measures only.  12/7.  Afebrile.  Has been tachycardic.  Hypotensive this morning.  On room air.  Continue morphine and Ativan.  Continue comfort care measures only.  Accepted by [facility].  12/8.  Afebrile.  Has been tachycardic.  Hypotensive.  On room air.  Patient states pain is well controlled.  There is planned meeting with her daughter and [facility].    I have discussed this patient's plan of care and discharge plan at IDT rounds today with Case Management, Nursing, Nursing leadership, and other members of the IDT team.    Consultants/Specialty  GI    Code Status  Comfort Care/DNR    Disposition  Patient is not medically cleared for discharge.   Anticipate discharge to to skilled nursing facility.  I have placed the appropriate orders for post-discharge needs.    Review of Systems  Review of Systems   Unable to perform ROS: Psychiatric disorder   Constitutional:  Negative for chills and fever.   Respiratory:  Negative  for cough and shortness of breath.    Cardiovascular:  Negative for chest pain, palpitations and leg swelling.   Gastrointestinal:  Negative for abdominal pain, constipation, diarrhea, melena (Resolved), nausea and vomiting.   Genitourinary:  Negative for dysuria, frequency and urgency.   Musculoskeletal:  Negative for falls.   Neurological:  Negative for weakness.   Psychiatric/Behavioral:  Negative for depression. The patient is not nervous/anxious.    All other systems reviewed and are negative.     Physical Exam       Physical Exam  Vitals and nursing note reviewed.   Constitutional:       General: She is not in acute distress.     Appearance: Normal appearance.   HENT:      Head: Normocephalic and atraumatic.      Right Ear: External ear normal.      Left Ear: External ear normal.      Nose: Nose normal.      Mouth/Throat:      Mouth: Mucous membranes are moist.      Pharynx: Oropharynx is clear.   Eyes:      Extraocular Movements: Extraocular movements intact.      Conjunctiva/sclera: Conjunctivae normal.   Cardiovascular:      Rate and Rhythm: Regular rhythm. Tachycardia present.      Pulses: Normal pulses.      Heart sounds: Normal heart sounds. No murmur heard.  Pulmonary:      Effort: Pulmonary effort is normal. No respiratory distress.      Breath sounds: Normal breath sounds. No stridor. No wheezing or rales.   Abdominal:      General: Abdomen is flat. Bowel sounds are normal. There is no distension.      Palpations: Abdomen is soft. There is no mass.      Tenderness: There is no abdominal tenderness.   Musculoskeletal:      Cervical back: Normal range of motion.      Comments: Right chest port   Skin:     General: Skin is warm.      Capillary Refill: Capillary refill takes less than 2 seconds.   Neurological:      General: No focal deficit present.      Mental Status: She is alert and oriented to person, place, and time. Mental status is at baseline.      Cranial Nerves: No cranial nerve deficit.    Psychiatric:         Mood and Affect: Mood normal.         Behavior: Behavior normal.       Fluids  No intake or output data in the 24 hours ending 12/08/22 2542      Laboratory                              Imaging  CT-ABDOMEN LIVER FOR HEPATIC MASS (CIRRHOSIS)   Final Result   Addendum (preliminary) 1 of 1   The case was reviewed.      At least 2 of the 3 lesions described in the original report are new since    7/11/2022 study and represent new hepatic metastases. The larger    metastasis in the medial hepatic dome measures 1.4 cm.      Findings were discussed with Dr. Cummings on 12/1/2022 2:30 PM.      Final         1.  Low-density lesion in the dome of the liver which is partially filled in an isodense on delayed imaging. Additional low-density right hepatic subcentimeter lesions are seen which are nonenhancing and isodense on delayed phase imaging.   2.  Small hiatal hernia      LI-RADS: LR-2: probably benign      DX-CHEST-PORTABLE (1 VIEW)   Final Result      1.  No acute cardiac or pulmonary abnormalities are identified.             Assessment/Plan  * GI bleed- (present on admission)  Assessment & Plan  11/22/22 EGD: esophagitis and multiple gastric and duodenal ulcers  11/30/22 EGD:   POSTOPERATIVE DIAGNOSIS:   LA grade D esophagitis.  4cm hiatal hernia.  Gastritis.  Clean based pyloric channel ulcer.  Multiple large clean based duodenal ulcers.  No active bleeding.  GI consulting  Oral PPI BID  Sucralfate  Monitor CBC  Maintain good peripheral IV access and access her port  Advance diet  Discussed avoidance of NSAIDs, ASA, acidic foods/beverages, temperature hot foods and alcohol.    Comfort care    Advanced care planning/counseling discussion  Assessment & Plan  Patient was of sound mind and voluntarily participated in the conversation.  Patient was present for the conversation.  I did speak with her daughter over the phone.  I discussed advance care planning face to face with the patient and family for at  least 30 minutes, including diagnosis, prognosis, plan of care, risks and benefits of any therapies that could be offered, as well as alternatives including palliation and hospice, as appropriate.  Forms were completed and placed in the chart.      Patient requested comfort care measures only.  She is now very clear that she wants hospice and no she is not going to get better.  Once only Ativan and morphine and no other medications.  Does not want blood draws or therapy.  Started on comfort care measures only.    Hemorrhagic shock (HCC)  Assessment & Plan  S/p IV fluid resuscitation and PRBCs  11/30 had EGD w/o sign of active bleed but did have duodenal ulcer gastritis and esophagitis.  Maintained large-bore peripheral IV access  Daily CBC  Transfuse as appropriate  12/1 Hgb:8.7    Comfort care    Anemia associated with acute blood loss- (present on admission)  Assessment & Plan  Secondary to GI bleed  Getting 2 units of PRBCs in the ED  Daily CBC  Transfuse to goal greater than 7 or more aggressively should she become hypotensive again  S/P EGD with ulcers and gastritis/esophagitis noted.    Comfort care    History of Subarachnoid hemorrhage (HCC)- (present on admission)  Assessment & Plan  Avoid NSAIDs, ASA, anticoagulation  Likely secondary to a fall.    Comfort care    Malignant neoplasm of right female breast (HCC)  Assessment & Plan  Patient is s/p initial chemotherapy and radiation therapy  She has known mets to the lung  Prior MRI brain suspicious of brain mets  She is currently on Keytruda infusions every 6 weeks and followed by Dr. Clemens  She has a port    Comfort care    Tobacco use- (present on admission)  Assessment & Plan  Encourage cessation    Comfort care    Lactic acidosis - resolved- (present on admission)  Assessment & Plan  Significant lactic acidosis in the setting of hemorrhagic shock  Ongoing resuscitation  Resolved    Comfort care    Transaminitis- (present on admission)  Assessment &  Plan  Hepatitis panel positive for hepatitis C antibody earlier this month.  Hepatitis C RNA negative on 11/29.   CT liver shows low density lesion in the dome of the liver, right hepatic subcentimeter lesions, small hiatal hernia.  Probably benign by LI-RADS.  Continue to trend LFTs    Comfort care    Essential hypertension- (present on admission)  Assessment & Plan  Continue active treatment with amldopine 10mg daily and metoprolol 25mg BID  Monitoring vitals.    Comfort care    Hypomagnesemia- (present on admission)  Assessment & Plan  Patient has a history of hypomagnesemia  Check and replace as appropriate    Comfort care    Hypokalemia- (present on admission)  Assessment & Plan  12/1 K:2.5  Replace IV and oral  Check magnesium and replace if appropriate  Repeat lab in a.m.    Comfort care    Bipolar 2 disorder (HCC)- (present on admission)  Assessment & Plan  Start prozac 20mg daily    Comfort care         VTE prophylaxis: pharmacologic prophylaxis contraindicated due to recent GIB    I have performed a physical exam and reviewed and updated ROS and Plan today (12/8/2022). In review of yesterday's note (12/7/2022), there are no changes except as documented above.

## 2022-12-10 NOTE — PROGRESS NOTES
[facility] Daily Progress Note    Date of Service  12/9/2022    Chief Complaint  Ashleigh Marquis is a 62 y.o. female admitted 11/29/2022 with dark and tarry stool, weakness    Hospital Course  No notes on file    Ashleigh Marquis is a 62 y.o. female who presented 11/29/2022 with previous medical history of includes alcohol abuse, breast cancer with known mets to the lung stage IV, she reports that she is not drinking actively at this point, depression, bipolar disorder, chronic pain, hypertension, recent subarachnoid hemorrhage, and recent GI bleed.       Patient was just in the hospital from November 3 to 24.  She was found down out in community, and was brought in here hypothermic with a temperature 93 degrees core.  Imaging showed frontal subarachnoid hemorrhage, for which the patient was treated nonoperatively.  She also had an MSSA pneumonia for which she was treated with antibiotics.  During her hospital stay she had a seizure, and was placed on Keppra as a result.  Significantly her hospital stay was also complicated by upper GI bleed for which she underwent endoscopy on November 22.  She was found to have esophagitis with multiple gastric and duodenal ulcers.  She did not have any esophageal varices at that time.     Patient presents back today with complaint of upper GI pain, and bloody emesis.  Symptoms began this morning.  She is also had diarrhea, however this is chronic for her.  She does note some black bowel movements earlier today.  Here in the emergency room the, the patient was initially hypotensive with systolic in the 70s, and had a lactic acid of 7.  She has since been transfused of 2 units of PRBCs, and given fluids.  On my examination she has systolic in the 90s, with a heart rate in the 80s.  GI has been consulted.    CT liver shows low density lesion in the dome of the liver, right hepatic subcentimeter lesions, small hiatal hernia.  Probably benign by LI-RADS.    Status post  EGD showing duodenal ulcers.  GI recommended PPI twice daily, sucralfate.    Interval Problem Update  Patient was seen and examined at bedside.  I have personally reviewed and interpreted vitals, labs, and imaging.    12/2.  Afebrile.  Episode tachycardia has resolved.  Hypotension has resolved.  On room air.  Hemoglobin 9.2, platelets 94, replete potassium.  Denies fever, chills, chest pain, shortness of breath.  States he is hungry and wants some yogurt.  No longer having dark tarry stool but still having significant diarrhea.  Ordered C. difficile.  PT/OT consult.  12/3.  Afebrile.  Tachycardic this morning.  On room air.  Patient refused all of her a.m. meds this morning as well as morning labs.  Denies fevers, chills or chest pains, shortness of breath.  Denies abdominal pain.  Has been tolerating oral intake but does not like the food.  Patient tells me that she wants hospice and no longer wants blood draws and no only pain medication.  Patient is alert and oriented.  And did not feel she can be understands that she says she is still wants to get better and stronger.  She is very clear that she does not want resuscitation or intubation.  Placed DNR/DNI order.  Ordered end-of-life discussion hospice consult.  Goals of care discussion ongoing.  Patient does have a psychiatric history and gets irritated with multiple questions.  I did update her daughter Katalina about changed to DNR/DNI and hospice consult more so just for information so the patient can understand  12/4.  Afebrile.  Intermittent tachycardia and hypotension.  On room air.  Patient is again requesting hospice.  States she wants hospice to get on the right pain medicine.  When questioned further she states that she is not in pain and that her pain medicine is working.  She also states that she would like to get better.  She did take her medications this morning.  Awaiting hospice end-of-life consult.  More informational as I do not believe patient  understands what hospice is and her goals of care.  She does get irritable and asked to many questions.  12/5.  Afebrile.  Intermittent tachycardia.  On room air.  Denies fevers, chills, chest pain, shortness of breath.  Patient states she is in pain everywhere.  Reports she wants pain meds not work and she needs a stronger staff which is why she wants hospice.  The past few days patient was stating that she was comfortable and the pain meds were working.  She is not very clear that she knows she will not get better.  Has been declining medications, labs, PT/OT.  Request morphine and Ativan and that said.  Very clear that she now wants comfort care measures only.  Did discuss this with her daughter Katalina over the phone.  Started on comfort care measures only.  12/6.  Afebrile.  Stable vitals.  On room air.  Patient has fever, chills, shortness of breath.  Patient states her pain is well controlled but still wants more pain meds.  [facility] is following.  Continue comfort care measures only.  12/7.  Afebrile.  Has been tachycardic.  Hypotensive this morning.  On room air.  Continue morphine and Ativan.  Continue comfort care measures only.  Accepted by [facility].  12/8.  Afebrile.  Has been tachycardic.  Hypotensive.  On room air.  Patient states pain is well controlled.  There is planned meeting with her daughter and [facility].  Denies fever, chills or chest pains, shortness of breath.  Pain is controlled.  Continue comfort care measures only.  Hospice meeting with family today.  Pending placement.    I have discussed this patient's plan of care and discharge plan at IDT rounds today with Case Management, Nursing, Nursing leadership, and other members of the IDT team.    Consultants/Specialty  GI    Code Status  Comfort Care/DNR    Disposition  Patient is not medically cleared for discharge.   Anticipate discharge to to skilled nursing facility.  I have placed the appropriate orders for  post-discharge needs.    Review of Systems  Review of Systems   Unable to perform ROS: Psychiatric disorder   Constitutional:  Negative for chills and fever.   Respiratory:  Negative for cough and shortness of breath.    Cardiovascular:  Negative for chest pain, palpitations and leg swelling.   Gastrointestinal:  Negative for abdominal pain, constipation, diarrhea, melena (Resolved), nausea and vomiting.   Genitourinary:  Negative for dysuria, frequency and urgency.   Musculoskeletal:  Negative for falls.   Neurological:  Negative for weakness.   Psychiatric/Behavioral:  Negative for depression. The patient is not nervous/anxious.    All other systems reviewed and are negative.     Physical Exam       Physical Exam  Vitals and nursing note reviewed.   Constitutional:       General: She is not in acute distress.     Appearance: Normal appearance.   HENT:      Head: Normocephalic and atraumatic.      Right Ear: External ear normal.      Left Ear: External ear normal.      Nose: Nose normal.      Mouth/Throat:      Mouth: Mucous membranes are moist.      Pharynx: Oropharynx is clear.   Eyes:      Extraocular Movements: Extraocular movements intact.      Conjunctiva/sclera: Conjunctivae normal.   Cardiovascular:      Rate and Rhythm: Regular rhythm. Tachycardia present.      Pulses: Normal pulses.      Heart sounds: Normal heart sounds. No murmur heard.  Pulmonary:      Effort: Pulmonary effort is normal. No respiratory distress.      Breath sounds: Normal breath sounds. No stridor. No wheezing or rales.   Abdominal:      General: Abdomen is flat. Bowel sounds are normal. There is no distension.      Palpations: Abdomen is soft. There is no mass.      Tenderness: There is no abdominal tenderness.   Musculoskeletal:      Cervical back: Normal range of motion.      Comments: Right chest port   Skin:     General: Skin is warm.      Capillary Refill: Capillary refill takes less than 2 seconds.   Neurological:      General:  No focal deficit present.      Mental Status: She is alert and oriented to person, place, and time. Mental status is at baseline.      Cranial Nerves: No cranial nerve deficit.   Psychiatric:         Mood and Affect: Mood normal.         Behavior: Behavior normal.       Fluids    Intake/Output Summary (Last 24 hours) at 12/9/2022 1652  Last data filed at 12/9/2022 0945  Gross per 24 hour   Intake 200 ml   Output --   Net 200 ml         Laboratory                              Imaging  CT-ABDOMEN LIVER FOR HEPATIC MASS (CIRRHOSIS)   Final Result   Addendum (preliminary) 1 of 1   The case was reviewed.      At least 2 of the 3 lesions described in the original report are new since    7/11/2022 study and represent new hepatic metastases. The larger    metastasis in the medial hepatic dome measures 1.4 cm.      Findings were discussed with Dr. Cummings on 12/1/2022 2:30 PM.      Final         1.  Low-density lesion in the dome of the liver which is partially filled in an isodense on delayed imaging. Additional low-density right hepatic subcentimeter lesions are seen which are nonenhancing and isodense on delayed phase imaging.   2.  Small hiatal hernia      LI-RADS: LR-2: probably benign      DX-CHEST-PORTABLE (1 VIEW)   Final Result      1.  No acute cardiac or pulmonary abnormalities are identified.             Assessment/Plan  * GI bleed- (present on admission)  Assessment & Plan  11/22/22 EGD: esophagitis and multiple gastric and duodenal ulcers  11/30/22 EGD:   POSTOPERATIVE DIAGNOSIS:   LA grade D esophagitis.  4cm hiatal hernia.  Gastritis.  Clean based pyloric channel ulcer.  Multiple large clean based duodenal ulcers.  No active bleeding.  GI consulting  Oral PPI BID  Sucralfate  Monitor CBC  Maintain good peripheral IV access and access her port  Advance diet  Discussed avoidance of NSAIDs, ASA, acidic foods/beverages, temperature hot foods and alcohol.    Comfort care    Advanced care planning/counseling  discussion  Assessment & Plan  Patient was of sound mind and voluntarily participated in the conversation.  Patient was present for the conversation.  I did speak with her daughter over the phone.  I discussed advance care planning face to face with the patient and family for at least 30 minutes, including diagnosis, prognosis, plan of care, risks and benefits of any therapies that could be offered, as well as alternatives including palliation and hospice, as appropriate.  Forms were completed and placed in the chart.      Patient requested comfort care measures only.  She is now very clear that she wants hospice and no she is not going to get better.  Once only Ativan and morphine and no other medications.  Does not want blood draws or therapy.  Started on comfort care measures only.    Hemorrhagic shock (HCC)  Assessment & Plan  S/p IV fluid resuscitation and PRBCs  11/30 had EGD w/o sign of active bleed but did have duodenal ulcer gastritis and esophagitis.  Maintained large-bore peripheral IV access  Daily CBC  Transfuse as appropriate  12/1 Hgb:8.7    Comfort care    Anemia associated with acute blood loss- (present on admission)  Assessment & Plan  Secondary to GI bleed  Getting 2 units of PRBCs in the ED  Daily CBC  Transfuse to goal greater than 7 or more aggressively should she become hypotensive again  S/P EGD with ulcers and gastritis/esophagitis noted.    Comfort care    History of Subarachnoid hemorrhage (HCC)- (present on admission)  Assessment & Plan  Avoid NSAIDs, ASA, anticoagulation  Likely secondary to a fall.    Comfort care    Malignant neoplasm of right female breast (HCC)  Assessment & Plan  Patient is s/p initial chemotherapy and radiation therapy  She has known mets to the lung  Prior MRI brain suspicious of brain mets  She is currently on Keytruda infusions every 6 weeks and followed by Dr. Clemens  She has a port    Comfort care    Tobacco use- (present on admission)  Assessment &  Plan  Encourage cessation    Comfort care    Lactic acidosis - resolved- (present on admission)  Assessment & Plan  Significant lactic acidosis in the setting of hemorrhagic shock  Ongoing resuscitation  Resolved    Comfort care    Transaminitis- (present on admission)  Assessment & Plan  Hepatitis panel positive for hepatitis C antibody earlier this month.  Hepatitis C RNA negative on 11/29.   CT liver shows low density lesion in the dome of the liver, right hepatic subcentimeter lesions, small hiatal hernia.  Probably benign by LI-RADS.  Continue to trend LFTs    Comfort care    Essential hypertension- (present on admission)  Assessment & Plan  Continue active treatment with amldopine 10mg daily and metoprolol 25mg BID  Monitoring vitals.    Comfort care    Hypomagnesemia- (present on admission)  Assessment & Plan  Patient has a history of hypomagnesemia  Check and replace as appropriate    Comfort care    Hypokalemia- (present on admission)  Assessment & Plan  12/1 K:2.5  Replace IV and oral  Check magnesium and replace if appropriate  Repeat lab in a.m.    Comfort care    Bipolar 2 disorder (HCC)- (present on admission)  Assessment & Plan  Start prozac 20mg daily    Comfort care         VTE prophylaxis: pharmacologic prophylaxis contraindicated due to recent GIB    I have performed a physical exam and reviewed and updated ROS and Plan today (12/9/2022). In review of yesterday's note (12/8/2022), there are no changes except as documented above.

## 2022-12-10 NOTE — CARE PLAN
The patient is Stable - Low risk of patient condition declining or worsening    Shift Goals  Clinical Goals: safety, pain/ anxiety/ and nausea control  Patient Goals: rest  Family Goals: not present    Progress made toward(s) clinical / shift goals:    Problem: Pain - Standard  Goal: Alleviation of pain or a reduction in pain to the patient’s comfort goal  Outcome: Progressing  Note: PRN pain meds are being administered per MAR.       Problem: Knowledge Deficit - Standard  Goal: Patient and family/care givers will demonstrate understanding of plan of care, disease process/condition, diagnostic tests and medications  Outcome: Progressing  Note: Patient is AxO x3 and understands plan of care, all questions answered at this time.       Problem: Fall Risk  Goal: Patient will remain free from falls  Outcome: Progressing  Note: Call light and personal belongings are within reach. Pt calls appropriately for nursing needs. Frequent rounding in place.  Bed is locked and in lowest position. Bed alarm is on and sounding.    Anxiety and nausea meds administered per MAR.     Patient is not progressing towards the following goals:      Problem: Bowel Elimination  Goal: Establish and maintain regular bowel function  Outcome: Not Progressing  Note: Pt continues to be incontinent of bowel.

## 2022-12-10 NOTE — PROGRESS NOTES
[facility] Medicine Daily Progress Note    Date of Service  12/10/2022    Chief Complaint  Ashleigh Marquis is a 62 y.o. female admitted 11/29/2022 with dark and tarry stool, weakness    Hospital Course  No notes on file    Ashleigh Marquis is a 62 y.o. female who presented 11/29/2022 with previous medical history of includes alcohol abuse, breast cancer with known mets to the lung stage IV, she reports that she is not drinking actively at this point, depression, bipolar disorder, chronic pain, hypertension, recent subarachnoid hemorrhage, and recent GI bleed.       Patient was just in the hospital from November 3 to 24.  She was found down out in community, and was brought in here hypothermic with a temperature 93 degrees core.  Imaging showed frontal subarachnoid hemorrhage, for which the patient was treated nonoperatively.  She also had an MSSA pneumonia for which she was treated with antibiotics.  During her hospital stay she had a seizure, and was placed on Keppra as a result.  Significantly her hospital stay was also complicated by upper GI bleed for which she underwent endoscopy on November 22.  She was found to have esophagitis with multiple gastric and duodenal ulcers.  She did not have any esophageal varices at that time.     Patient presents back today with complaint of upper GI pain, and bloody emesis.  Symptoms began this morning.  She is also had diarrhea, however this is chronic for her.  She does note some black bowel movements earlier today.  Here in the emergency room the, the patient was initially hypotensive with systolic in the 70s, and had a lactic acid of 7.  She has since been transfused of 2 units of PRBCs, and given fluids.  On my examination she has systolic in the 90s, with a heart rate in the 80s.  GI has been consulted.    CT liver shows low density lesion in the dome of the liver, right hepatic subcentimeter lesions, small hiatal hernia.  Probably benign by LI-RADS.    Status post  EGD showing duodenal ulcers.  GI recommended PPI twice daily, sucralfate.    Interval Problem Update  Patient was seen and examined at bedside.  I have personally reviewed and interpreted vitals, labs, and imaging.    12/2.  Afebrile.  Episode tachycardia has resolved.  Hypotension has resolved.  On room air.  Hemoglobin 9.2, platelets 94, replete potassium.  Denies fever, chills, chest pain, shortness of breath.  States he is hungry and wants some yogurt.  No longer having dark tarry stool but still having significant diarrhea.  Ordered C. difficile.  PT/OT consult.  12/3.  Afebrile.  Tachycardic this morning.  On room air.  Patient refused all of her a.m. meds this morning as well as morning labs.  Denies fevers, chills or chest pains, shortness of breath.  Denies abdominal pain.  Has been tolerating oral intake but does not like the food.  Patient tells me that she wants hospice and no longer wants blood draws and no only pain medication.  Patient is alert and oriented.  And did not feel she can be understands that she says she is still wants to get better and stronger.  She is very clear that she does not want resuscitation or intubation.  Placed DNR/DNI order.  Ordered end-of-life discussion hospice consult.  Goals of care discussion ongoing.  Patient does have a psychiatric history and gets irritated with multiple questions.  I did update her daughter Katalina about changed to DNR/DNI and hospice consult more so just for information so the patient can understand  12/4.  Afebrile.  Intermittent tachycardia and hypotension.  On room air.  Patient is again requesting hospice.  States she wants hospice to get on the right pain medicine.  When questioned further she states that she is not in pain and that her pain medicine is working.  She also states that she would like to get better.  She did take her medications this morning.  Awaiting hospice end-of-life consult.  More informational as I do not believe patient  understands what hospice is and her goals of care.  She does get irritable and asked to many questions.  12/5.  Afebrile.  Intermittent tachycardia.  On room air.  Denies fevers, chills, chest pain, shortness of breath.  Patient states she is in pain everywhere.  Reports she wants pain meds not work and she needs a stronger staff which is why she wants hospice.  The past few days patient was stating that she was comfortable and the pain meds were working.  She is not very clear that she knows she will not get better.  Has been declining medications, labs, PT/OT.  Request morphine and Ativan and that said.  Very clear that she now wants comfort care measures only.  Did discuss this with her daughter Katalina over the phone.  Started on comfort care measures only.  12/6.  Afebrile.  Stable vitals.  On room air.  Patient has fever, chills, shortness of breath.  Patient states her pain is well controlled but still wants more pain meds.  [facility] is following.  Continue comfort care measures only.  12/7.  Afebrile.  Has been tachycardic.  Hypotensive this morning.  On room air.  Continue morphine and Ativan.  Continue comfort care measures only.  Accepted by [facility].  12/8.  Afebrile.  Has been tachycardic.  Hypotensive.  On room air.  Patient states pain is well controlled.  There is planned meeting with her daughter and [facility].  12/9.  Denies fever, chills or chest pains, shortness of breath.  Pain is controlled.  Continue comfort care measures only.  Hospice meeting with family today.  Pending placement.  12/10.  Afebrile.  Stable vitals.  On room air.  Patient complains of pain everywhere.  Continue comfort measures only.  Continue sublingual morphine and lorazepam.  Pending placement on hospice.    I have discussed this patient's plan of care and discharge plan at IDT rounds today with Case Management, Nursing, Nursing leadership, and other members of the IDT  team.    Consultants/Specialty  GI    Code Status  Comfort Care/DNR    Disposition  Patient is not medically cleared for discharge.   Anticipate discharge to to skilled nursing facility.  I have placed the appropriate orders for post-discharge needs.    Review of Systems  Review of Systems   Unable to perform ROS: Psychiatric disorder   Constitutional:  Negative for chills and fever.   Respiratory:  Negative for cough and shortness of breath.    Cardiovascular:  Negative for chest pain, palpitations and leg swelling.   Gastrointestinal:  Negative for abdominal pain, constipation, diarrhea, melena (Resolved), nausea and vomiting.   Genitourinary:  Negative for dysuria, frequency and urgency.   Musculoskeletal:  Negative for falls.   Neurological:  Negative for weakness.   Psychiatric/Behavioral:  Negative for depression. The patient is not nervous/anxious.    All other systems reviewed and are negative.     Physical Exam  Temp:  [36.4 °C (97.6 °F)] 36.4 °C (97.6 °F)  Pulse:  [68] 68  Resp:  [18] 18  BP: (126)/(60) 126/60  SpO2:  [93 %] 93 %    Physical Exam  Vitals and nursing note reviewed.   Constitutional:       General: She is not in acute distress.     Appearance: Normal appearance.   HENT:      Head: Normocephalic and atraumatic.      Right Ear: External ear normal.      Left Ear: External ear normal.      Nose: Nose normal.      Mouth/Throat:      Mouth: Mucous membranes are moist.      Pharynx: Oropharynx is clear.   Eyes:      Extraocular Movements: Extraocular movements intact.      Conjunctiva/sclera: Conjunctivae normal.   Cardiovascular:      Rate and Rhythm: Regular rhythm. Tachycardia present.      Pulses: Normal pulses.      Heart sounds: Normal heart sounds. No murmur heard.  Pulmonary:      Effort: Pulmonary effort is normal. No respiratory distress.      Breath sounds: Normal breath sounds. No stridor. No wheezing or rales.   Abdominal:      General: Abdomen is flat. Bowel sounds are normal. There  is no distension.      Palpations: Abdomen is soft. There is no mass.      Tenderness: There is no abdominal tenderness.   Musculoskeletal:      Cervical back: Normal range of motion.      Comments: Right chest port   Skin:     General: Skin is warm.      Capillary Refill: Capillary refill takes less than 2 seconds.   Neurological:      General: No focal deficit present.      Mental Status: She is alert and oriented to person, place, and time. Mental status is at baseline.      Cranial Nerves: No cranial nerve deficit.   Psychiatric:         Mood and Affect: Mood normal.         Behavior: Behavior normal.       Fluids  No intake or output data in the 24 hours ending 12/10/22 1533      Laboratory                              Imaging  CT-ABDOMEN LIVER FOR HEPATIC MASS (CIRRHOSIS)   Final Result   Addendum (preliminary) 1 of 1   The case was reviewed.      At least 2 of the 3 lesions described in the original report are new since    7/11/2022 study and represent new hepatic metastases. The larger    metastasis in the medial hepatic dome measures 1.4 cm.      Findings were discussed with Dr. Cummings on 12/1/2022 2:30 PM.      Final         1.  Low-density lesion in the dome of the liver which is partially filled in an isodense on delayed imaging. Additional low-density right hepatic subcentimeter lesions are seen which are nonenhancing and isodense on delayed phase imaging.   2.  Small hiatal hernia      LI-RADS: LR-2: probably benign      DX-CHEST-PORTABLE (1 VIEW)   Final Result      1.  No acute cardiac or pulmonary abnormalities are identified.             Assessment/Plan  * GI bleed- (present on admission)  Assessment & Plan  11/22/22 EGD: esophagitis and multiple gastric and duodenal ulcers  11/30/22 EGD:   POSTOPERATIVE DIAGNOSIS:   LA grade D esophagitis.  4cm hiatal hernia.  Gastritis.  Clean based pyloric channel ulcer.  Multiple large clean based duodenal ulcers.  No active bleeding.  GI consulting  Oral PPI  BID  Sucralfate  Monitor CBC  Maintain good peripheral IV access and access her port  Advance diet  Discussed avoidance of NSAIDs, ASA, acidic foods/beverages, temperature hot foods and alcohol.    Comfort care    Advanced care planning/counseling discussion  Assessment & Plan  Patient was of sound mind and voluntarily participated in the conversation.  Patient was present for the conversation.  I did speak with her daughter over the phone.  I discussed advance care planning face to face with the patient and family for at least 30 minutes, including diagnosis, prognosis, plan of care, risks and benefits of any therapies that could be offered, as well as alternatives including palliation and hospice, as appropriate.  Forms were completed and placed in the chart.      Patient requested comfort care measures only.  She is now very clear that she wants hospice and no she is not going to get better.  Once only Ativan and morphine and no other medications.  Does not want blood draws or therapy.  Started on comfort care measures only.    Hemorrhagic shock (HCC)  Assessment & Plan  S/p IV fluid resuscitation and PRBCs  11/30 had EGD w/o sign of active bleed but did have duodenal ulcer gastritis and esophagitis.  Maintained large-bore peripheral IV access  Daily CBC  Transfuse as appropriate  12/1 Hgb:8.7    Comfort care    Anemia associated with acute blood loss- (present on admission)  Assessment & Plan  Secondary to GI bleed  Getting 2 units of PRBCs in the ED  Daily CBC  Transfuse to goal greater than 7 or more aggressively should she become hypotensive again  S/P EGD with ulcers and gastritis/esophagitis noted.    Comfort care    History of Subarachnoid hemorrhage (HCC)- (present on admission)  Assessment & Plan  Avoid NSAIDs, ASA, anticoagulation  Likely secondary to a fall.    Comfort care    Malignant neoplasm of right female breast (HCC)  Assessment & Plan  Patient is s/p initial chemotherapy and radiation  therapy  She has known mets to the lung  Prior MRI brain suspicious of brain mets  She is currently on Keytruda infusions every 6 weeks and followed by Dr. Clemens  She has a port    Comfort care    Tobacco use- (present on admission)  Assessment & Plan  Encourage cessation    Comfort care    Lactic acidosis - resolved- (present on admission)  Assessment & Plan  Significant lactic acidosis in the setting of hemorrhagic shock  Ongoing resuscitation  Resolved    Comfort care    Transaminitis- (present on admission)  Assessment & Plan  Hepatitis panel positive for hepatitis C antibody earlier this month.  Hepatitis C RNA negative on 11/29.   CT liver shows low density lesion in the dome of the liver, right hepatic subcentimeter lesions, small hiatal hernia.  Probably benign by LI-RADS.  Continue to trend LFTs    Comfort care    Essential hypertension- (present on admission)  Assessment & Plan  Continue active treatment with amldopine 10mg daily and metoprolol 25mg BID  Monitoring vitals.    Comfort care    Hypomagnesemia- (present on admission)  Assessment & Plan  Patient has a history of hypomagnesemia  Check and replace as appropriate    Comfort care    Hypokalemia- (present on admission)  Assessment & Plan  12/1 K:2.5  Replace IV and oral  Check magnesium and replace if appropriate  Repeat lab in a.m.    Comfort care    Bipolar 2 disorder (HCC)- (present on admission)  Assessment & Plan  Start prozac 20mg daily    Comfort care         VTE prophylaxis: pharmacologic prophylaxis contraindicated due to recent GIB    I have performed a physical exam and reviewed and updated ROS and Plan today (12/10/2022). In review of yesterday's note (12/9/2022), there are no changes except as documented above.

## 2022-12-11 NOTE — HOSPICE
[facility] referral/consult response    Is this patient accepted to [facility]?: Yes  What hospice level of care?: Routine/Community  Approved by provider: Yes    Anticipated DC date: TBD  DC Barriers: Placement  Additional Information:     Met with patient and daughter, Katalina. Hospice discussion done. Unfortunately, family has no capability of caring for patient at home and placement is needed. Signing of hospice consents to follow. Updated Case Management.

## 2022-12-11 NOTE — CARE PLAN
Problem: Pain - Standard  Goal: Alleviation of pain or a reduction in pain to the patient’s comfort goal  Outcome: Progressin: PRN pain meds given as ordered. For Comfort care measures       Problem: Fall Risk  Goal: Patient will remain free from falls  Outcome: Progressing: Bed alarm on. 3 side rails up    The patient is Watcher - Medium risk of patient condition declining or worsening    Shift Goals  Clinical Goals: Safty, pain control, comfort care.  Patient Goals: rest  Family Goals: NA    Progress made toward(s) clinical / shift goals:  Assumed care of patient at 0715. Received bedside report from night shift RN. Bed is locked and lowest position, call light within reach. Treaded socks in place. Patient updated on plan of care, no complaints or pain at this time. Pt AxOx2-3 Patient breathing pattern is unlabored. Pt is medical, Comfort care measures only. All needs met at this time. Will continue care and monitoring as ordered.

## 2022-12-12 NOTE — CARE PLAN
The patient is Stable - Low risk of patient condition declining or worsening    Shift Goals  Clinical Goals: safety, pain control  Patient Goals: rest  Family Goals: SHAI    Progress made toward(s) clinical / shift goals:    Problem: Pain - Standard  Goal: Alleviation of pain or a reduction in pain to the patient’s comfort goal  12/12/2022 0044 by Tracy Bright R.N.  Outcome: Progressing  Note: Patient been resting through out the shift. PRN meds given per order.      Problem: Skin Integrity  Goal: Skin integrity is maintained or improved  Outcome: Progressing  Note: No new skin issues noted. Patient on q2 turn protocol.       Problem: Fall Risk  Goal: Patient will remain free from falls  Outcome: Progressing  Note: Fall precautions in placed. Call light and personal belongings within reach.

## 2022-12-12 NOTE — HOSPICE
Hospice evaluation with Dr. Marinelli and Dr. Pérez.     Patient is approved for GIP but no family bedside.     Spoke to daughter Katalina she is deferring all decisions to patients sister Yanelis.  Spoke to Traci and she lives in [place]. Will fax consents for her to sign and send back.   Katalina currently has COVID and unable to come to hospital.

## 2022-12-12 NOTE — DISCHARGE PLANNING
Case Management Discharge Planning    Admission Date: 11/29/2022  GMLOS: 4.5  ALOS: 13    6-Clicks ADL Score: 17  6-Clicks Mobility Score: 19  PT and/or OT Eval ordered: Yes  Post-acute Referrals Ordered: Yes  Post-acute Choice Obtained: Yes  Has referral(s) been sent to post-acute provider:  Yes      Anticipated Discharge Dispo: Discharge Disposition: D/T to SNF under Medicaid but not cert under Medicare w/planned hosp IP readmit(92) (Hospice referral to [facility] pending)    DME Needed: No    Action(s) Taken: Updated Provider/Nurse on Discharge Plan, Choice obtained, and Referral(s) sent    Escalations Completed: None    Medically Clear: No    Next Steps: Patient discussed during morning IDT rounds with team. Patient is comfort care with Hospice, [facility] in to see patient and accepted patient for GIP pending consents signed by . Hospice RN is faxing papers to  Yanelis in [place] to sign and return. CM also spoke with [facility] for group home availability for bed. She states she does have a female bed available. She needs covid swab, TB quantiferon ordered. [facility] will be faxing over paperwork to be filled out by family and doctor pending GIP consents. H&P faxed to Sayda Wooten to evaluate patient status. CM to continue to follow for discharge planning.     Barriers to Discharge: Medical clearance and Pending Placement    Is the patient up for discharge tomorrow: No

## 2022-12-12 NOTE — PROGRESS NOTES
[facility] Medicine Daily Progress Note    Date of Service  12/11/2022    Chief Complaint  Ashleigh Marquis is a 62 y.o. female admitted 11/29/2022 with dark and tarry stool, weakness    Hospital Course  No notes on file    Ashleigh Marquis is a 62 y.o. female who presented 11/29/2022 with previous medical history of includes alcohol abuse, breast cancer with known mets to the lung stage IV, she reports that she is not drinking actively at this point, depression, bipolar disorder, chronic pain, hypertension, recent subarachnoid hemorrhage, and recent GI bleed.       Patient was just in the hospital from November 3 to 24.  She was found down out in community, and was brought in here hypothermic with a temperature 93 degrees core.  Imaging showed frontal subarachnoid hemorrhage, for which the patient was treated nonoperatively.  She also had an MSSA pneumonia for which she was treated with antibiotics.  During her hospital stay she had a seizure, and was placed on Keppra as a result.  Significantly her hospital stay was also complicated by upper GI bleed for which she underwent endoscopy on November 22.  She was found to have esophagitis with multiple gastric and duodenal ulcers.  She did not have any esophageal varices at that time.     Patient presents back today with complaint of upper GI pain, and bloody emesis.  Symptoms began this morning.  She is also had diarrhea, however this is chronic for her.  She does note some black bowel movements earlier today.  Here in the emergency room the, the patient was initially hypotensive with systolic in the 70s, and had a lactic acid of 7.  She has since been transfused of 2 units of PRBCs, and given fluids.  On my examination she has systolic in the 90s, with a heart rate in the 80s.  GI has been consulted.    CT liver shows low density lesion in the dome of the liver, right hepatic subcentimeter lesions, small hiatal hernia.  Probably benign by LI-RADS.    Status post  EGD showing duodenal ulcers.  GI recommended PPI twice daily, sucralfate.    Interval Problem Update  Patient was seen and examined at bedside.  I have personally reviewed and interpreted vitals, labs, and imaging.    12/2.  Afebrile.  Episode tachycardia has resolved.  Hypotension has resolved.  On room air.  Hemoglobin 9.2, platelets 94, replete potassium.  Denies fever, chills, chest pain, shortness of breath.  States he is hungry and wants some yogurt.  No longer having dark tarry stool but still having significant diarrhea.  Ordered C. difficile.  PT/OT consult.  12/3.  Afebrile.  Tachycardic this morning.  On room air.  Patient refused all of her a.m. meds this morning as well as morning labs.  Denies fevers, chills or chest pains, shortness of breath.  Denies abdominal pain.  Has been tolerating oral intake but does not like the food.  Patient tells me that she wants hospice and no longer wants blood draws and no only pain medication.  Patient is alert and oriented.  And did not feel she can be understands that she says she is still wants to get better and stronger.  She is very clear that she does not want resuscitation or intubation.  Placed DNR/DNI order.  Ordered end-of-life discussion hospice consult.  Goals of care discussion ongoing.  Patient does have a psychiatric history and gets irritated with multiple questions.  I did update her daughter Katalina about changed to DNR/DNI and hospice consult more so just for information so the patient can understand  12/4.  Afebrile.  Intermittent tachycardia and hypotension.  On room air.  Patient is again requesting hospice.  States she wants hospice to get on the right pain medicine.  When questioned further she states that she is not in pain and that her pain medicine is working.  She also states that she would like to get better.  She did take her medications this morning.  Awaiting hospice end-of-life consult.  More informational as I do not believe patient  understands what hospice is and her goals of care.  She does get irritable and asked to many questions.  12/5.  Afebrile.  Intermittent tachycardia.  On room air.  Denies fevers, chills, chest pain, shortness of breath.  Patient states she is in pain everywhere.  Reports she wants pain meds not work and she needs a stronger staff which is why she wants hospice.  The past few days patient was stating that she was comfortable and the pain meds were working.  She is not very clear that she knows she will not get better.  Has been declining medications, labs, PT/OT.  Request morphine and Ativan and that said.  Very clear that she now wants comfort care measures only.  Did discuss this with her daughter Katalina over the phone.  Started on comfort care measures only.  12/6.  Afebrile.  Stable vitals.  On room air.  Patient has fever, chills, shortness of breath.  Patient states her pain is well controlled but still wants more pain meds.  [facility] is following.  Continue comfort care measures only.  12/7.  Afebrile.  Has been tachycardic.  Hypotensive this morning.  On room air.  Continue morphine and Ativan.  Continue comfort care measures only.  Accepted by [facility].  12/8.  Afebrile.  Has been tachycardic.  Hypotensive.  On room air.  Patient states pain is well controlled.  There is planned meeting with her daughter and [facility].  12/9.  Denies fever, chills or chest pains, shortness of breath.  Pain is controlled.  Continue comfort care measures only.  Hospice meeting with family today.  Pending placement.  12/10.  Afebrile.  Stable vitals.  On room air.  Patient complains of pain everywhere.  Continue comfort measures only.  Continue sublingual morphine and lorazepam.  Pending placement on hospice.  12/11.  Afebrile.  Has been tachycardic.  Hypertensive.  On room air.  Denies fevers, chills, chest pain, shortness of breath.  Reports pain is controlled.  Pending placement with hospice.  Continue  comfort care measures.    I have discussed this patient's plan of care and discharge plan at IDT rounds today with Case Management, Nursing, Nursing leadership, and other members of the IDT team.    Consultants/Specialty  GI    Code Status  Comfort Care/DNR    Disposition  Patient is not medically cleared for discharge.   Anticipate discharge to to skilled nursing facility.  I have placed the appropriate orders for post-discharge needs.    Review of Systems  Review of Systems   Unable to perform ROS: Psychiatric disorder   Constitutional:  Negative for chills and fever.   Respiratory:  Negative for cough and shortness of breath.    Cardiovascular:  Negative for chest pain, palpitations and leg swelling.   Gastrointestinal:  Negative for abdominal pain, constipation, diarrhea, melena (Resolved), nausea and vomiting.   Genitourinary:  Negative for dysuria, frequency and urgency.   Musculoskeletal:  Negative for falls.   Neurological:  Negative for weakness.   Psychiatric/Behavioral:  Negative for depression. The patient is not nervous/anxious.    All other systems reviewed and are negative.     Physical Exam  Temp:  [36.3 °C (97.3 °F)] 36.3 °C (97.3 °F)  Pulse:  [117] 117  Resp:  [17] 17  BP: (132)/(96) 132/96  SpO2:  [96 %] 96 %    Physical Exam  Vitals and nursing note reviewed.   Constitutional:       General: She is not in acute distress.     Appearance: Normal appearance.   HENT:      Head: Normocephalic and atraumatic.      Right Ear: External ear normal.      Left Ear: External ear normal.      Nose: Nose normal.      Mouth/Throat:      Mouth: Mucous membranes are moist.      Pharynx: Oropharynx is clear.   Eyes:      Extraocular Movements: Extraocular movements intact.      Conjunctiva/sclera: Conjunctivae normal.   Cardiovascular:      Rate and Rhythm: Regular rhythm. Tachycardia present.      Pulses: Normal pulses.      Heart sounds: Normal heart sounds. No murmur heard.  Pulmonary:      Effort: Pulmonary  effort is normal. No respiratory distress.      Breath sounds: Normal breath sounds. No stridor. No wheezing or rales.   Abdominal:      General: Abdomen is flat. Bowel sounds are normal. There is no distension.      Palpations: Abdomen is soft. There is no mass.      Tenderness: There is no abdominal tenderness.   Musculoskeletal:      Cervical back: Normal range of motion.      Comments: Right chest port   Skin:     General: Skin is warm.      Capillary Refill: Capillary refill takes less than 2 seconds.   Neurological:      General: No focal deficit present.      Mental Status: She is alert and oriented to person, place, and time. Mental status is at baseline.      Cranial Nerves: No cranial nerve deficit.   Psychiatric:         Mood and Affect: Mood normal.         Behavior: Behavior normal.       Fluids    Intake/Output Summary (Last 24 hours) at 12/11/2022 1714  Last data filed at 12/11/2022 0735  Gross per 24 hour   Intake 150 ml   Output --   Net 150 ml         Laboratory                              Imaging  CT-ABDOMEN LIVER FOR HEPATIC MASS (CIRRHOSIS)   Final Result   Addendum (preliminary) 1 of 1   The case was reviewed.      At least 2 of the 3 lesions described in the original report are new since    7/11/2022 study and represent new hepatic metastases. The larger    metastasis in the medial hepatic dome measures 1.4 cm.      Findings were discussed with Dr. Cummings on 12/1/2022 2:30 PM.      Final         1.  Low-density lesion in the dome of the liver which is partially filled in an isodense on delayed imaging. Additional low-density right hepatic subcentimeter lesions are seen which are nonenhancing and isodense on delayed phase imaging.   2.  Small hiatal hernia      LI-RADS: LR-2: probably benign      DX-CHEST-PORTABLE (1 VIEW)   Final Result      1.  No acute cardiac or pulmonary abnormalities are identified.             Assessment/Plan  * GI bleed- (present on admission)  Assessment &  Plan  11/22/22 EGD: esophagitis and multiple gastric and duodenal ulcers  11/30/22 EGD:   POSTOPERATIVE DIAGNOSIS:   LA grade D esophagitis.  4cm hiatal hernia.  Gastritis.  Clean based pyloric channel ulcer.  Multiple large clean based duodenal ulcers.  No active bleeding.  GI consulting  Oral PPI BID  Sucralfate  Monitor CBC  Maintain good peripheral IV access and access her port  Advance diet  Discussed avoidance of NSAIDs, ASA, acidic foods/beverages, temperature hot foods and alcohol.    Comfort care    Advanced care planning/counseling discussion  Assessment & Plan  Patient was of sound mind and voluntarily participated in the conversation.  Patient was present for the conversation.  I did speak with her daughter over the phone.  I discussed advance care planning face to face with the patient and family for at least 30 minutes, including diagnosis, prognosis, plan of care, risks and benefits of any therapies that could be offered, as well as alternatives including palliation and hospice, as appropriate.  Forms were completed and placed in the chart.      Patient requested comfort care measures only.  She is now very clear that she wants hospice and no she is not going to get better.  Once only Ativan and morphine and no other medications.  Does not want blood draws or therapy.  Started on comfort care measures only.    Hemorrhagic shock (HCC)  Assessment & Plan  S/p IV fluid resuscitation and PRBCs  11/30 had EGD w/o sign of active bleed but did have duodenal ulcer gastritis and esophagitis.  Maintained large-bore peripheral IV access  Daily CBC  Transfuse as appropriate  12/1 Hgb:8.7    Comfort care    Anemia associated with acute blood loss- (present on admission)  Assessment & Plan  Secondary to GI bleed  Getting 2 units of PRBCs in the ED  Daily CBC  Transfuse to goal greater than 7 or more aggressively should she become hypotensive again  S/P EGD with ulcers and gastritis/esophagitis noted.    Comfort  care    History of Subarachnoid hemorrhage (HCC)- (present on admission)  Assessment & Plan  Avoid NSAIDs, ASA, anticoagulation  Likely secondary to a fall.    Comfort care    Malignant neoplasm of right female breast (HCC)  Assessment & Plan  Patient is s/p initial chemotherapy and radiation therapy  She has known mets to the lung  Prior MRI brain suspicious of brain mets  She is currently on Keytruda infusions every 6 weeks and followed by Dr. Clemens  She has a port    Comfort care    Tobacco use- (present on admission)  Assessment & Plan  Encourage cessation    Comfort care    Lactic acidosis - resolved- (present on admission)  Assessment & Plan  Significant lactic acidosis in the setting of hemorrhagic shock  Ongoing resuscitation  Resolved    Comfort care    Transaminitis- (present on admission)  Assessment & Plan  Hepatitis panel positive for hepatitis C antibody earlier this month.  Hepatitis C RNA negative on 11/29.   CT liver shows low density lesion in the dome of the liver, right hepatic subcentimeter lesions, small hiatal hernia.  Probably benign by LI-RADS.  Continue to trend LFTs    Comfort care    Essential hypertension- (present on admission)  Assessment & Plan  Continue active treatment with amldopine 10mg daily and metoprolol 25mg BID  Monitoring vitals.    Comfort care    Hypomagnesemia- (present on admission)  Assessment & Plan  Patient has a history of hypomagnesemia  Check and replace as appropriate    Comfort care    Hypokalemia- (present on admission)  Assessment & Plan  12/1 K:2.5  Replace IV and oral  Check magnesium and replace if appropriate  Repeat lab in a.m.    Comfort care    Bipolar 2 disorder (HCC)- (present on admission)  Assessment & Plan  Start prozac 20mg daily    Comfort care         VTE prophylaxis: pharmacologic prophylaxis contraindicated due to recent GIB    I have performed a physical exam and reviewed and updated ROS and Plan today (12/11/2022). In review of yesterday's  note (12/10/2022), there are no changes except as documented above.

## 2022-12-12 NOTE — PROGRESS NOTES
Received report and assumed care of patient. Patient alert and oriented x 2, on RA. Denies pain or discomfort at this time. Assessment completed. Bed locked and lowest position. Call light and belongings are within reach. R chest port clean,dry and intact. CHG completed. Repositioned to patient's comfort. Will continue to monitor.

## 2022-12-13 PROBLEM — R57.8 HEMORRHAGIC SHOCK (HCC): Status: RESOLVED | Noted: 2022-01-01 | Resolved: 2022-01-01

## 2022-12-13 NOTE — CARE PLAN
Problem: Pain - Standard  Goal: Alleviation of pain or a reduction in pain to the patient’s comfort goal  Outcome: Progressing     Problem: Knowledge Deficit - Comfort Care  Goal: Patient and family/care givers will demonstrate understanding of dying process and grieving  Outcome: Progressing     Problem: Psychosocial - Comfort Care  Goal: Patient's level of anxiety will decrease  Outcome: Progressing   The patient is Stable - Low risk of patient condition declining or worsening    Shift Goals  Clinical Goals: comfort  Patient Goals: rest  Family Goals: SHAI    Progress made toward(s) clinical / shift goals:      Patient is not progressing towards the following goals:    Pt lethargic. Comfort care measures in place. Morphine and Ativan given. Pt incontinent. 2 p assist.

## 2022-12-13 NOTE — H&P
Hospice General Inpatient    History and Physical    Author: Ramone Marinelli D.O.     Date & Time note created:    12/13/2022   11:17 AM       History of Present Illness:    Ashleigh Marquis is 62 y.o. female with a history of metastatic breast cancer with lesions to lungs and brain on salvage immunotherapy recently hospitalized for hemorrhagic shock found to have multiple gastric and duodenal ulcers. Additionally, patient sustained recent SAH, along with new onset seizures. Patient was admitted 11/29/22 for severe abdominal pain and hematochezia, but decided on 12/4/22 to pursue comfort measures only. Discharge was delayed due to lack of placement. While on comfort care patient began experiencing escalating pain, anxiety and agitation. Hospice re-evaluated and accepted for GIP level of care for refractory symptoms management at end-of-life.          Review of Systems:     ROS  Unable to obtain.     Vitals:  Weight/BMI: There is no height or weight on file to calculate BMI.  LMP 11/02/2015   There were no vitals filed for this visit.  Oxygen Therapy:       Physical Exam:  Physical Exam  Fetal position. Sits up to voice. Not able to communicate needs. Agitated at times. Appears uncomfortable.     Past Medical History:   Past Medical History:   Diagnosis Date    Alcoholism (HCC)     Anemia     pt states she doesn't think it is a current issue    Anxiety     Anxiety and depression 03/23/2021    Arthritis     osteo-legs, knees    ASTHMA     Inhaler as needed.     Breath shortness     On exertion.     Bronchitis 01/18/2022    In the past    Cancer (HCC) 1981    cervical    Chronic low back pain     Congestive heart failure (HCC)     1/18/22:  pt states she is not sure.     Dental disorder     upper and lower dentures. 1/18/22: Pt. denies    Depression     EtOH dependence ([facility])     Fall     alcohol related    GERD (gastroesophageal reflux disease)     Heart burn     HTN     Hypertension     Indigestion     GERD     "Muscle disorder     OSTEOPOROSIS     Other specified symptom associated with female genital organs     \"I have fibroids bad\"\"    Pain 03/23/2021    breast, legs, joints    Pneumonia 01/18/2022    In the past    Psychiatric disorder     PTSD (post-traumatic stress disorder)     Renal disorder     Hx of stones    Renal failure Around 2019    One time related to heeavily drinking per pt.     Seizure (HCC)     several years ago r/t alcohol withdrawl    Seizure (HCC) 11/03/2020    last seizure was 11/2020    Ulcer     Vitamin D deficiency        Past Surgical History:  Past Surgical History:   Procedure Laterality Date    CT UPPER GI ENDOSCOPY,DIAGNOSIS N/A 11/30/2022    Procedure: GASTROSCOPY;  Surgeon: Chris Valdez M.D.;  Location: SURGERY SAME DAY [facility];  Service: Gastroenterology    CT UPPER GI ENDOSCOPY,DIAGNOSIS N/A 11/22/2022    Procedure: GASTROSCOPY;  Surgeon: Tita Miller M.D.;  Location: SURGERY SAME DAY [facility];  Service: Gastroenterology    CT UPPER GI ENDOSCOPY,BIOPSY N/A 11/22/2022    Procedure: GASTROSCOPY, WITH BIOPSY;  Surgeon: Tita Miller M.D.;  Location: SURGERY SAME DAY [facility];  Service: Gastroenterology    CT UPPER GI ENDOSCOPY,DIAGNOSIS N/A 10/22/2022    Procedure: ESOPHAGOGASTRODUODENOSCOPY with biopsy;  Surgeon: Zachary Pate M.D.;  Location: [facility];  Service: Gastroenterology    CT COLONOSCOPY,DIAGNOSTIC N/A 10/22/2022    Procedure: COLONOSCOPY with biopsy;  Surgeon: Zachary Pate M.D.;  Location: SURGERY [facility];  Service: Gastroenterology    PB MASTECTOMY, PARTIAL Right 3/26/2021    Procedure: MASTECTOMY, PARTIAL - ESTEBAN LOCALIZED;  Surgeon: Janice Knowles M.D.;  Location: SURGERY SAME DAY [facility];  Service: General    AXILLARY NODE DISSECTION Right 3/26/2021    Procedure: LYMPHADENECTOMY, AXILLARY.;  Surgeon: Janice Knowles M.D.;  Location: SURGERY SAME DAY [facility];  Service: General    GASTROSCOPY-ENDO N/A 10/3/2018    Procedure: " GASTROSCOPY-ENDO;  Surgeon: Ben Negro M.D.;  Location: ENDOSCOPY [facility];  Service: Gastroenterology    CYSTOSCOPY STENT PLACEMENT  11/9/2010    Performed by ANGEL HARRIS at SURGERY [facility]    URETEROSCOPY  11/9/2010    Performed by ANGEL HARRIS at SURGERY [facility]    LASERTRIPSY  11/9/2010    Performed by ANGEL HARRIS at SURGERY [facility]    STENT REMOVAL  11/9/2010    Performed by ANGEL HARRIS at SURGERY [facility]    CYSTO STENT PLACEMNT PRE SURG  10/7/2010    Performed by ANGEL HARRIS at SURGERY [facility]    OTHER Left 2010    Leg    HERNIA REPAIR  1977       Current Outpatient Medications:  Home Medications    **Home medications have not yet been reviewed for this encounter**         Medication Allergy/Sensitivities:  Allergies   Allergen Reactions    Aspirin Unspecified     Severely sick as a child     Sulfa Drugs Vomiting    Toradol Vomiting       Family History:  Family History   Problem Relation Age of Onset    Heart Disease Father     Hypertension Father     Diabetes Father     Cancer Paternal Grandmother     Depression Other     Lung Disease Neg Hx     Stroke Neg Hx        Social History:  Social History     Tobacco Use    Smokeless tobacco: Former     Types: Chew    Tobacco comments:     1/2 pack per day   Vaping Use    Vaping Use: Never used   Substance Use Topics    Alcohol use: Not Currently     Comment: vodka, heavy use-pt stated she is not drinking as of 3/23    Drug use: Not Currently     Comment: In the past smoked pot       Lab Data Review:  Recent Results (from the past 24 hour(s))   SNF COVID Discharge (DRY NASAL SWAB IS REQUIRED)    Collection Time: 12/12/22  5:18 PM   Result Value Ref Range    SARS-CoV-2 Source Nasal Swab     SARS-COV ANTIGEN [place] NotDetected NotDetected       Imaging/Procedures Review:    No orders to display          Assessment:  Metastatic breast cancer  CNS lesions, recent SAH and resultant seizure  "disorder  Refractory pain associated with above  Recent GI bleed and hemorrhagic shock    Hospice general inpatient DNR/DNI    Plan:    1) Dilaudid 1 mg IV every 4 hours routine. Dilaudid 1 to 2 mg IV every 30 minutes as needed for dyspnea and/or pain (including signs of restlessness, muscle tension, frowning, brow furrowing, moaning, or if they just look \"uncomfortable.\")  2) Ativan 2 mg IV every 4 hours routine. Ativan 1 mg every 60 minutes as needed for severe anxiety, nausea/vomiting, in combination with morphine for dyspnea  3) Glycopyrrolate 0.2 mg IV every 6 hours as needed for copious secretions (repositioning patient's head and neck often work better than medication and should be tried first. We do not believe \"death rattling\" is uncomfortable for the patient, though family may think different).  4) Artifical tears and saliva - please use as often as able. Dry/puffy eyes are uncomfortable.  5) Gently reposition patient every 2 hours if they are able to tolerate and if they are comfortable to begin with.  6) Dulcolax suppository BID PRN for constipation. Do not use if patient is actively dying with life expectancy of hours.      This patient requires a high level of nursing care for pain control and symptom management. The patient is being admitted to hospice general inpatient to manage symptoms of pain, dyspnea and agitation.    The patient will be followed by [facility] team on a daily basis to assess for symptom control as well as appropriateness for GIP level of care.       Ramone Marinelli,   Hospice and Palliative Medicine  [facility] Medical Director  [id number] [place]  [id number]  P: [phone]  F: [phone]  C: [phone]  "

## 2022-12-13 NOTE — DISCHARGE SUMMARY
Discharge Summary    CHIEF COMPLAINT ON ADMISSION  Chief Complaint   Patient presents with    Abdominal Pain     RUQ pain with nausea, vomiting and black tarry stool for a few days. History of GI bleed.       Reason for Admission  ems    Admission Date  11/29/2022     CODE STATUS  Comfort Care/DNR    HPI & HOSPITAL COURSE  This is a 62 y.o. female here with melena and weakness.  She has breast cancer with metastatic lesions to the lungs, alcohol dependence, depression, bipolar disorder, chronic pain, hypertension, recent subarachnoid hemorrhage and recent GI bleed.    She was admitted previously from November 2022 to November 24, 2022 after found down out in the community.  She was hypothermic on admission.  Imaging showed frontal subarachnoid hemorrhage.  She also had MSSA pneumonia and was treated with antibiotics.  She was started on Keppra for seizures.  Her hospital stay was complicated by upper GI bleed.  Endoscopy from November 22, 2022 showed esophagitis with multiple gastric and duodenal ulcers.    On this admission, patient was hypotensive with systolic blood pressure in the 70s, lactic acid of 7, with complaints of bloody emesis and epigastric pain.  She received 2 units of blood.  CT liver showed low-density lesions in the dome of the liver.  Patient received her medications and stated that she wished to be hospice, and no longer wanted any blood draws.  She was alert and oriented.  Hospice was consulted end-of-life discussion.  Patient continued to request hospice and declined all medications other than pain medications.  She was transitioned to comfort care on 12/4/2022.    Patient was accepted by [facility] hospice today.    Therefore, she is discharged in guarded and stable condition to hospice.      FOLLOW UP ITEMS POST DISCHARGE  [facility]    DISCHARGE DIAGNOSES  Principal Problem:    GI bleed POA: Yes  Active Problems:    Bipolar 2 disorder (HCC) POA: Yes    Hypokalemia POA: Yes    Hypomagnesemia POA:  Yes    Essential hypertension POA: Yes    Transaminitis POA: Yes    Lactic acidosis - resolved POA: Yes    Tobacco use POA: Yes    Malignant neoplasm of right female breast (HCC) POA: Unknown    History of Subarachnoid hemorrhage (HCC) POA: Yes    Anemia associated with acute blood loss POA: Yes    Upper GI bleed POA: Yes    Advanced care planning/counseling discussion POA: Unknown  Resolved Problems:    Hemorrhagic shock (HCC) POA: Unknown      FOLLOW UP  Future Appointments   Date Time Provider Department Center   12/14/2022 To Be Determined Marcello Sharma R.N. [facility] None   12/15/2022 To Be Determined Marcello Sharma R.N. [facility] None   12/16/2022 To Be Determined Marcello Sharma R.N. [facility] None   12/17/2022 To Be Determined Matt Frazier R.N. [facility] None   12/22/2022 11:00 AM Taj Bailey M.D. [facility] None   12/23/2022 11[facility]     No follow-up provider specified.    MEDICATIONS ON DISCHARGE     Medication List        STOP taking these medications      acetaminophen 500 MG Tabs  Commonly known as: TYLENOL     amLODIPine 10 MG Tabs  Commonly known as: NORVASC     carisoprodol 350 MG Tabs  Commonly known as: SOMA     cholestyramine 4 GM Pack  Commonly known as: QUESTRAN,PREVALITE     clonazepam 2 MG tablet  Commonly known as: KLONOPIN     diphenoxylate-atropine 2.5-0.025 MG Tabs  Commonly known as: LOMOTIL     levETIRAcetam 500 MG Tabs  Commonly known as: KEPPRA     loperamide 2 MG Caps  Commonly known as: IMODIUM     metoprolol tartrate 25 MG Tabs  Commonly known as: LOPRESSOR     multivitamin Tabs     nystatin powder  Commonly known as: MYCOSTATIN     omeprazole 40 MG delayed-release capsule  Commonly known as: PRILOSEC     ondansetron 4 MG Tabs tablet  Commonly known as: ZOFRAN     oxyCODONE immediate release 10 MG immediate release tablet  Commonly known as: ROXICODONE     predniSONE 10 MG Tabs  Commonly known as: DELTASONE     prochlorperazine 10 MG Tabs  Commonly known as:  COMPAZINE     Ventolin  (90 Base) MCG/ACT Aers inhalation aerosol  Generic drug: albuterol              Allergies  Allergies   Allergen Reactions    Aspirin Unspecified     Severely sick as a child     Sulfa Drugs Vomiting    Toradol Vomiting       DIET  Orders Placed This Encounter   Procedures    Diet Order Diet: Regular     Standing Status:   Standing     Number of Occurrences:   1     Order Specific Question:   Diet:     Answer:   Regular [1]       ACTIVITY  As tolerated.      LINES, DRAINS, AND WOUNDS  This is an automated list. Peripheral IVs will be removed prior to discharge.  Single Lumen Implantable Port 06/14/22 Right Chest (Active)       Single Lumen Implantable Port 06/14/22 Right Chest (Active)       Single Lumen Implantable Port 08/08/22 Right Chest (Active)   Next Needle Change Date 12/13/22 12/12/22 2010   Site Assessment Clean;Dry;Intact 12/12/22 2010   Line Status Heparin locked 12/12/22 2010   Dressing Type Occlusive;Transparent 12/12/22 2010   Dressing Status Clean;Dry;Intact 12/12/22 2010   Dressing Intervention N/A 12/12/22 2010   Dressing Change Due 12/13/22 12/12/22 2010   Date Primary Tubing Changed 12/03/22 12/08/22 0900   Date Secondary Tubing Changed 12/01/22 12/01/22 0800   Date IV Connector(s) Changed 12/06/22 12/08/22 0900   NEXT Primary Tubing Change  12/10/22 12/08/22 0900   NEXT Secondary Tubing Change  12/03/22 11/29/22 2000   NEXT IV Connector(s) Change 12/13/22 12/09/22 0945   Line Necessity Assessed Lack of Peripheral Access 12/12/22 2010       Moisture Associated Skin Damage 06/14/21 Anterior Panus (Active)       Moisture Associated Skin Damage 06/14/21 Anterior;Middle Other (comment) (Active)       Moisture Associated Skin Damage 10/20/22 (Active)       Moisture Associated Skin Damage Buttock;Perineum (Active)       Wound 03/26/21 Incision Breast Right dermabond (Active)       Wound 11/07/22 Traumatic Elbow Right (Active)       Wound 11/07/22 Traumatic Back Lateral  Right (Active)       Wound 11/07/22 MTH 3;MTH 4 Left (Active)                  MENTAL STATUS ON TRANSFER             CONSULTATIONS  GI    PROCEDURES  None    LABORATORY  Lab Results   Component Value Date    SODIUM 138 12/03/2022    POTASSIUM 3.1 (L) 12/03/2022    CHLORIDE 110 12/03/2022    CO2 18 (L) 12/03/2022    GLUCOSE 85 12/03/2022    BUN 9 12/03/2022    CREATININE 0.54 12/03/2022    CREATININE 0.8 05/01/2009        Lab Results   Component Value Date    WBC 8.6 12/03/2022    HEMOGLOBIN 9.4 (L) 12/03/2022    HEMATOCRIT 29.8 (L) 12/03/2022    PLATELETCT 103 (L) 12/03/2022        Total time of the discharge process exceeds 35 minutes.

## 2022-12-13 NOTE — CARE PLAN
The patient is Unstable - High likelihood or risk of patient condition declining or worsening, comfort care    Shift Goals  Clinical Goals: Comfort; Safety  Patient Goals: SHAI  Family Goals: Not present at this time    Progress made toward(s) clinical / shift goals: Patient transitioned to [facility] today. Medicated for pain per MAR.    Problem: Knowledge Deficit - Comfort Care  Goal: Patient and family/care givers will demonstrate understanding of dying process and grieving  Outcome: Progressing     Problem: Psychosocial - Comfort Care  Goal: Spiritual and cultural needs incorporated into hospitalization  Outcome: Progressing  Goal: Patient's level of anxiety will decrease  Outcome: Progressing  Goal: Patient and family will demonstrate ability to cope with life altering diagnosis and/or procedure  Outcome: Progressing  Goal: Privacy will be maintained for patient and family  Outcome: Progressing     Problem: Discharge Planning - Comfort Care  Goal: Patient's continuum of care needs are met  Outcome: Progressing     Problem: Respiratory - Comfort Care  Goal: Patient's respiratory function will be supported  Outcome: Progressing     Patient is not progressing towards the following goals: Patient impulsive and difficult to redirect. Bed and frame alarm in place and sounding appropriately, patient in room near nursing station, bed locked and in lowest position, non skid socks in use, fall wrist band in place, all other appropriate fall precautions in place.

## 2022-12-13 NOTE — CARE PLAN
The patient is Watcher - Medium risk of patient condition declining or worsening    Shift Goals  Clinical Goals: comfort; safety  Patient Goals: SHAI  Family Goals: SHAI    Progress made toward(s) clinical / shift goals:    Problem: Fall Risk  Goal: Patient will remain free from falls  Outcome: Progressing     Problem: Psychosocial - Comfort Care  Goal: Patient's level of anxiety will decrease  Outcome: Progressing       Patient is not progressing towards the following goals:

## 2022-12-13 NOTE — HOSPICE
Patient has been approved for GIP (in patient hospice) by Dr. Marinelli.   Consents have been signed by sister Carla.   Dr. Hancock notified to [place] patient.

## 2022-12-13 NOTE — PROGRESS NOTES
[facility] Medicine Daily Progress Note    Date of Service  12/12/2022    Chief Complaint  Ashleigh Marquis is a 62 y.o. female admitted 11/29/2022 with dark and tarry stool, weakness    Hospital Course  No notes on file    Ashleigh Marquis is a 62 y.o. female who presented 11/29/2022 with previous medical history of includes alcohol abuse, breast cancer with known mets to the lung stage IV, she reports that she is not drinking actively at this point, depression, bipolar disorder, chronic pain, hypertension, recent subarachnoid hemorrhage, and recent GI bleed.       Patient was just in the hospital from November 3 to 24.  She was found down out in community, and was brought in here hypothermic with a temperature 93 degrees core.  Imaging showed frontal subarachnoid hemorrhage, for which the patient was treated nonoperatively.  She also had an MSSA pneumonia for which she was treated with antibiotics.  During her hospital stay she had a seizure, and was placed on Keppra as a result.  Significantly her hospital stay was also complicated by upper GI bleed for which she underwent endoscopy on November 22.  She was found to have esophagitis with multiple gastric and duodenal ulcers.  She did not have any esophageal varices at that time.     Patient presents back today with complaint of upper GI pain, and bloody emesis.  Symptoms began this morning.  She is also had diarrhea, however this is chronic for her.  She does note some black bowel movements earlier today.  Here in the emergency room the, the patient was initially hypotensive with systolic in the 70s, and had a lactic acid of 7.  She has since been transfused of 2 units of PRBCs, and given fluids.  On my examination she has systolic in the 90s, with a heart rate in the 80s.  GI has been consulted.    CT liver shows low density lesion in the dome of the liver, right hepatic subcentimeter lesions, small hiatal hernia.  Probably benign by LI-RADS.    Status post  EGD showing duodenal ulcers.  GI recommended PPI twice daily, sucralfate.    Interval Problem Update  Patient was seen and examined at bedside.  I have personally reviewed and interpreted vitals, labs, and imaging.    12/2.  Afebrile.  Episode tachycardia has resolved.  Hypotension has resolved.  On room air.  Hemoglobin 9.2, platelets 94, replete potassium.  Denies fever, chills, chest pain, shortness of breath.  States he is hungry and wants some yogurt.  No longer having dark tarry stool but still having significant diarrhea.  Ordered C. difficile.  PT/OT consult.  12/3.  Afebrile.  Tachycardic this morning.  On room air.  Patient refused all of her a.m. meds this morning as well as morning labs.  Denies fevers, chills or chest pains, shortness of breath.  Denies abdominal pain.  Has been tolerating oral intake but does not like the food.  Patient tells me that she wants hospice and no longer wants blood draws and no only pain medication.  Patient is alert and oriented.  And did not feel she can be understands that she says she is still wants to get better and stronger.  She is very clear that she does not want resuscitation or intubation.  Placed DNR/DNI order.  Ordered end-of-life discussion hospice consult.  Goals of care discussion ongoing.  Patient does have a psychiatric history and gets irritated with multiple questions.  I did update her daughter Katalina about changed to DNR/DNI and hospice consult more so just for information so the patient can understand  12/4.  Afebrile.  Intermittent tachycardia and hypotension.  On room air.  Patient is again requesting hospice.  States she wants hospice to get on the right pain medicine.  When questioned further she states that she is not in pain and that her pain medicine is working.  She also states that she would like to get better.  She did take her medications this morning.  Awaiting hospice end-of-life consult.  More informational as I do not believe patient  understands what hospice is and her goals of care.  She does get irritable and asked to many questions.  12/5.  Afebrile.  Intermittent tachycardia.  On room air.  Denies fevers, chills, chest pain, shortness of breath.  Patient states she is in pain everywhere.  Reports she wants pain meds not work and she needs a stronger staff which is why she wants hospice.  The past few days patient was stating that she was comfortable and the pain meds were working.  She is not very clear that she knows she will not get better.  Has been declining medications, labs, PT/OT.  Request morphine and Ativan and that said.  Very clear that she now wants comfort care measures only.  Did discuss this with her daughter Katalina over the phone.  Started on comfort care measures only.  12/6.  Afebrile.  Stable vitals.  On room air.  Patient has fever, chills, shortness of breath.  Patient states her pain is well controlled but still wants more pain meds.  [facility] is following.  Continue comfort care measures only.  12/7.  Afebrile.  Has been tachycardic.  Hypotensive this morning.  On room air.  Continue morphine and Ativan.  Continue comfort care measures only.  Accepted by [facility].  12/8.  Afebrile.  Has been tachycardic.  Hypotensive.  On room air.  Patient states pain is well controlled.  There is planned meeting with her daughter and [facility].  12/9.  Denies fever, chills or chest pains, shortness of breath.  Pain is controlled.  Continue comfort care measures only.  Hospice meeting with family today.  Pending placement.  12/10.  Afebrile.  Stable vitals.  On room air.  Patient complains of pain everywhere.  Continue comfort measures only.  Continue sublingual morphine and lorazepam.  Pending placement on hospice.  12/11.  Afebrile.  Has been tachycardic.  Hypertensive.  On room air.  Denies fevers, chills, chest pain, shortness of breath.  Reports pain is controlled.  Pending placement with hospice.  Continue  comfort care measures.  12/12.  Afebrile.  Tachycardic.  Hypotensive.  On room air.  Complains of pain all over.  Daughter has COVID and has not been able to meet with hospice.  Daughter is deferring decisions to the patient's sister.  Patient was accepted by [facility] for GIP, but need consents signed.  Continue comfort care measures only.      I have discussed this patient's plan of care and discharge plan at IDT rounds today with Case Management, Nursing, Nursing leadership, and other members of the IDT team.    Consultants/Specialty  GI    Code Status  Comfort Care/DNR    Disposition  Patient is medically cleared for discharge.   Anticipate discharge to to hospice.  I have placed the appropriate orders for post-discharge needs.    Review of Systems  Review of Systems   Unable to perform ROS: Psychiatric disorder   Constitutional:  Negative for chills and fever.   Respiratory:  Negative for cough and shortness of breath.    Cardiovascular:  Negative for chest pain, palpitations and leg swelling.   Gastrointestinal:  Negative for abdominal pain, constipation, diarrhea, melena (Resolved), nausea and vomiting.   Genitourinary:  Negative for dysuria, frequency and urgency.   Musculoskeletal:  Negative for falls.   Neurological:  Negative for weakness.   Psychiatric/Behavioral:  Negative for depression. The patient is not nervous/anxious.    All other systems reviewed and are negative.     Physical Exam  Temp:  [37.3 °C (99.1 °F)] 37.3 °C (99.1 °F)  Pulse:  [124] 124  Resp:  [18] 18  BP: (99)/(72) 99/72  SpO2:  [100 %] 100 %    Physical Exam  Vitals and nursing note reviewed.   Constitutional:       General: She is not in acute distress.     Appearance: Normal appearance.   HENT:      Head: Normocephalic and atraumatic.      Right Ear: External ear normal.      Left Ear: External ear normal.      Nose: Nose normal.      Mouth/Throat:      Mouth: Mucous membranes are moist.      Pharynx: Oropharynx is clear.    Eyes:      Extraocular Movements: Extraocular movements intact.      Conjunctiva/sclera: Conjunctivae normal.   Cardiovascular:      Rate and Rhythm: Regular rhythm. Tachycardia present.      Pulses: Normal pulses.      Heart sounds: Normal heart sounds. No murmur heard.  Pulmonary:      Effort: Pulmonary effort is normal. No respiratory distress.      Breath sounds: Normal breath sounds. No stridor. No wheezing or rales.   Abdominal:      General: Abdomen is flat. Bowel sounds are normal. There is no distension.      Palpations: Abdomen is soft. There is no mass.      Tenderness: There is no abdominal tenderness.   Musculoskeletal:      Cervical back: Normal range of motion.      Comments: Right chest port   Skin:     General: Skin is warm.      Capillary Refill: Capillary refill takes less than 2 seconds.   Neurological:      General: No focal deficit present.      Mental Status: She is alert and oriented to person, place, and time. Mental status is at baseline.      Cranial Nerves: No cranial nerve deficit.   Psychiatric:         Mood and Affect: Mood normal.         Behavior: Behavior normal.       Fluids    Intake/Output Summary (Last 24 hours) at 12/12/2022 1644  Last data filed at 12/12/2022 0626  Gross per 24 hour   Intake 120 ml   Output --   Net 120 ml         Laboratory                              Imaging  CT-ABDOMEN LIVER FOR HEPATIC MASS (CIRRHOSIS)   Final Result   Addendum (preliminary) 1 of 1   The case was reviewed.      At least 2 of the 3 lesions described in the original report are new since    7/11/2022 study and represent new hepatic metastases. The larger    metastasis in the medial hepatic dome measures 1.4 cm.      Findings were discussed with Dr. Cummings on 12/1/2022 2:30 PM.      Final         1.  Low-density lesion in the dome of the liver which is partially filled in an isodense on delayed imaging. Additional low-density right hepatic subcentimeter lesions are seen which are  nonenhancing and isodense on delayed phase imaging.   2.  Small hiatal hernia      LI-RADS: LR-2: probably benign      DX-CHEST-PORTABLE (1 VIEW)   Final Result      1.  No acute cardiac or pulmonary abnormalities are identified.             Assessment/Plan  * GI bleed- (present on admission)  Assessment & Plan  11/22/22 EGD: esophagitis and multiple gastric and duodenal ulcers  11/30/22 EGD:   POSTOPERATIVE DIAGNOSIS:   LA grade D esophagitis.  4cm hiatal hernia.  Gastritis.  Clean based pyloric channel ulcer.  Multiple large clean based duodenal ulcers.  No active bleeding.  GI consulting  Oral PPI BID  Sucralfate  Monitor CBC  Maintain good peripheral IV access and access her port  Advance diet  Discussed avoidance of NSAIDs, ASA, acidic foods/beverages, temperature hot foods and alcohol.    Comfort care    Advanced care planning/counseling discussion  Assessment & Plan  Patient was of sound mind and voluntarily participated in the conversation.  Patient was present for the conversation.  I did speak with her daughter over the phone.  I discussed advance care planning face to face with the patient and family for at least 30 minutes, including diagnosis, prognosis, plan of care, risks and benefits of any therapies that could be offered, as well as alternatives including palliation and hospice, as appropriate.  Forms were completed and placed in the chart.      Patient requested comfort care measures only.  She is now very clear that she wants hospice and no she is not going to get better.  Once only Ativan and morphine and no other medications.  Does not want blood draws or therapy.  Started on comfort care measures only.    Hemorrhagic shock (HCC)  Assessment & Plan  S/p IV fluid resuscitation and PRBCs  11/30 had EGD w/o sign of active bleed but did have duodenal ulcer gastritis and esophagitis.  Maintained large-bore peripheral IV access  Daily CBC  Transfuse as appropriate  12/1 Hgb:8.7    Comfort  care    Anemia associated with acute blood loss- (present on admission)  Assessment & Plan  Secondary to GI bleed  Getting 2 units of PRBCs in the ED  Daily CBC  Transfuse to goal greater than 7 or more aggressively should she become hypotensive again  S/P EGD with ulcers and gastritis/esophagitis noted.    Comfort care    History of Subarachnoid hemorrhage (HCC)- (present on admission)  Assessment & Plan  Avoid NSAIDs, ASA, anticoagulation  Likely secondary to a fall.    Comfort care    Malignant neoplasm of right female breast (HCC)  Assessment & Plan  Patient is s/p initial chemotherapy and radiation therapy  She has known mets to the lung  Prior MRI brain suspicious of brain mets  She is currently on Keytruda infusions every 6 weeks and followed by Dr. Clemens  She has a port    Comfort care    Tobacco use- (present on admission)  Assessment & Plan  Encourage cessation    Comfort care    Lactic acidosis - resolved- (present on admission)  Assessment & Plan  Significant lactic acidosis in the setting of hemorrhagic shock  Ongoing resuscitation  Resolved    Comfort care    Transaminitis- (present on admission)  Assessment & Plan  Hepatitis panel positive for hepatitis C antibody earlier this month.  Hepatitis C RNA negative on 11/29.   CT liver shows low density lesion in the dome of the liver, right hepatic subcentimeter lesions, small hiatal hernia.  Probably benign by LI-RADS.  Continue to trend LFTs    Comfort care    Essential hypertension- (present on admission)  Assessment & Plan  Continue active treatment with amldopine 10mg daily and metoprolol 25mg BID  Monitoring vitals.    Comfort care    Hypomagnesemia- (present on admission)  Assessment & Plan  Patient has a history of hypomagnesemia  Check and replace as appropriate    Comfort care    Hypokalemia- (present on admission)  Assessment & Plan  12/1 K:2.5  Replace IV and oral  Check magnesium and replace if appropriate  Repeat lab in a.m.    Comfort  care    Bipolar 2 disorder (HCC)- (present on admission)  Assessment & Plan  Start prozac 20mg daily    Comfort care         VTE prophylaxis: pharmacologic prophylaxis contraindicated due to recent GIB    I have performed a physical exam and reviewed and updated ROS and Plan today (12/12/2022). In review of yesterday's note (12/11/2022), there are no changes except as documented above.

## 2022-12-14 NOTE — CARE PLAN
The patient is Watcher - Medium risk of patient condition declining or worsening    Shift Goals  Clinical Goals: Comfort; Safety  Patient Goals: SHAI  Family Goals: Not present at this time    Progress made toward(s) clinical / shift goals:    Problem: Psychosocial - Comfort Care  Goal: Patient's level of anxiety will decrease  Description: Target End Date:  1-3 days or as soon as patient condition allows    1.  Collaborate with patient and family/caregiver to identify triggers and develop strategies to cope with anxiety  2.  Implement stimuli reduction, calming techniques  3.  Pharmacologic management per provider order  4.  Encourage patient/family/care giver participation  5.  Collaborate with interdisciplinary team including Psychologist or Behavioral Health Team as needed  Outcome: Progressing  Goal: Privacy will be maintained for patient and family  Description: Target End Date:  End of day 1    Obtain private room  Outcome: Progressing       Patient is not progressing towards the following goals:

## 2022-12-14 NOTE — HOSPICE
GIP     Patient agitated this morning. Yelling at  that she hears me when I asked her questions. She was trying to swing and hit RN last night.   Patient did not received Ativan IV at 6 am due to not available on the floor. Ativan PO was not given. Advised RN to given Ativan PO now.     Spoke to RN and notified Dr. Marinelli

## 2022-12-15 NOTE — HOSPICE
Patient found lying in bed, calm, appears comfortable. Per staff CNA patient has been more comfortable with less agitation during the last two days.    She is non responsive to verbal or tactile stimuli. Normal respiratory effort, no acute distress.     She has been receiving scheduled dilaudid and lorazepam, two doses of haldol PRN in the lat 24-hours.    No changes to medication regimen at this time.

## 2022-12-15 NOTE — PROGRESS NOTES
Brief GIP progress note    Patient with eyes open, non-verbal, appears sedated. By report, much more comfortable than this morning.     Continue scheduled Ativan and Dilaudid every 4 hours with PRN dosing as needed for breakthrough pain or agitation. Haldol added if Ativan ineffective for agitation. Oral options for pain and anxiety available as well if port malfunctions/IV access lost.     Please reach out with questions. D/w RN/hospice team.     Ramone Marinelli,   Hospice and Palliative Medicine  [facility] Medical Director  [address]  [id number]  P: [phone]  F: [phone]  C: [phone]

## 2022-12-15 NOTE — CARE PLAN
The patient is Watcher - Medium risk of patient condition declining or worsening    Shift Goals  Clinical Goals: patient safety, decrease anxiety  Patient Goals: SHAI  Family Goals: Not present at this time    Progress made toward(s) clinical / shift goals:    Problem: Psychosocial - Comfort Care  Goal: Patient's level of anxiety will decrease  Description: Target End Date:  1-3 days or as soon as patient condition allows    1.  Collaborate with patient and family/caregiver to identify triggers and develop strategies to cope with anxiety  2.  Implement stimuli reduction, calming techniques  3.  Pharmacologic management per provider order  4.  Encourage patient/family/care giver participation  5.  Collaborate with interdisciplinary team including Psychologist or Behavioral Health Team as needed  Outcome: Progressing  Goal: Privacy will be maintained for patient and family  Description: Target End Date:  End of day 1    Obtain private room  Outcome: Progressing       Patient is not progressing towards the following goals:

## 2022-12-16 NOTE — CARE PLAN
The patient is Watcher - Medium risk of patient condition declining or worsening    Shift Goals  Clinical Goals: comfort  Patient Goals: SHAI  Family Goals: Not present at this time    Progress made toward(s) clinical / shift goals:    Problem: Knowledge Deficit - Comfort Care  Goal: Patient and family/care givers will demonstrate understanding of dying process and grieving  Outcome: Progressing     Problem: Respiratory - Comfort Care  Goal: Patient's respiratory function will be supported  Outcome: Progressing       Patient is not progressing towards the following goals:

## 2022-12-16 NOTE — PROGRESS NOTES
No signs of pain or acute distress noted. All due meds given. Fall/Safety precautions maintained.

## 2022-12-17 NOTE — CARE PLAN
Patient nonverbal and unable to follow commands. No signs or symptoms of pain, discomfort or respiratory distress. Turns self side to side. R port CDI with patent blood return. Unable to use call light appropriately. Hourly rounding in place.    The patient is Unstable - High likelihood or risk of patient condition declining or worsening    Shift Goals  Clinical Goals: Comfort/safety  Patient Goals: SHAI  Family Goals: NA    Progress made toward(s) clinical / shift goals:  Comfort measures in place. Patient remains free from injury and falls; room close to nurse's station and frequent rounding in place.     Patient is not progressing towards the following goals: NA

## 2022-12-18 NOTE — CARE PLAN
The patient is Stable - Low risk of patient condition declining or worsening    Shift Goals  Clinical Goals: Comfort  Patient Goals: SHAI  Family Goals: NA    Patient was able to track this RN with her eyes and eyes open to her name. Patient was repositioned for comfort. Patient was medicated per MAR. Patient is calm and resting with unlabored breathing.    Progress made toward(s) clinical / shift goals:      Problem: Knowledge Deficit - Comfort Care  Goal: Patient and family/care givers will demonstrate understanding of dying process and grieving  Outcome: Not Met    Patient is not progressing towards the following goals:    Problem: Knowledge Deficit - Comfort Care  Goal: Patient and family/care givers will demonstrate understanding of dying process and grieving  Outcome: Not Met

## 2022-12-19 NOTE — CARE PLAN
The patient is Stable - Low risk of patient condition declining or worsening    Shift Goals  Clinical Goals: Comfort  Patient Goals: SHAI  Family Goals: NA    Patient was medicated per MAR for pain and restlessness. Patient is unresponsive to voice. Unable to assess patient's orientation level.    Progress made toward(s) clinical / shift goals:    Problem: Psychosocial - Comfort Care  Goal: Patient's level of anxiety will decrease  Outcome: Progressing       Patient is not progressing towards the following goals:      Problem: Knowledge Deficit - Comfort Care  Goal: Patient and family/care givers will demonstrate understanding of dying process and grieving  Outcome: Not Met

## 2022-12-19 NOTE — PROGRESS NOTES
Patient  at 0530. 2 RN Pronouncement done at bedside with Jackie SANTOYO RN. Hospitalist, Mallory Gaspar, notified. Family members notified.

## 2022-12-19 NOTE — PROGRESS NOTES
Brief GIP Progress Note    Notified of patient becoming restless and agitated prior to scheduled comfort meds. Will increase frequency to every 3 hours from every 4 hours.     Patient continues to meet criteria for GIP level of care by requiring scheduled intravenous medications needing frequent dose adjusting for comfort at end of life.     Ramone Marinelli,   Hospice and Palliative Medicine  [facility] Medical Director  [address]  [address]  P: [phone]  F: [phone]  C: [phone]

## 2022-12-19 NOTE — DISCHARGE SUMMARY
DISCHARGE SUMMARY     Patient ID:    Name:             Ashleigh Marquis   YOB: 1960  Age:                 62 y.o.  female   MRN:               [id number]      [facility] Admit Date: 2022       Discharge Date: 22    [facility]    Admission/Discharge Diagnoses:   Metastatic breast cancer  CNS lesions, recent SAH and resultant seizure disorder  Refractory pain associated with above  Recent GI bleed and hemorrhagic shock      BRIEF SUMMARY OF [facility] HOSPITAL ADMISSION/STAY:   Ashleigh Marquis is 62 y.o. female with a history of metastatic breast cancer with lesions to lungs and brain on salvage immunotherapy recently hospitalized for hemorrhagic shock found to have multiple gastric and duodenal ulcers. Additionally, patient sustained recent SAH, along with new onset seizures. Patient was admitted 22 for severe abdominal pain and hematochezia, but decided on 22 to pursue comfort measures only. Discharge was delayed due to lack of placement. While on comfort care patient began experiencing escalating pain, anxiety and agitation. Hospice re-evaluated and accepted for [facility] level of care for refractory symptoms management at end-of-life.     Patient required escalating scheduled doses of both Dilaudid and Ativan to achieve comfort. She  peacefully at 0530 on 2022.       DISPOSITION:     CONDITION:     FOLLOW UP:  Bereavement      Ramone Marinelli DO  Hospice and Palliative Medicine  [facility] Medical Director  [address]  [address]  P: [phone]  F: [phone]  C: [phone]

## 2022-12-22 ENCOUNTER — APPOINTMENT (OUTPATIENT)
Dept: HEMATOLOGY ONCOLOGY | Facility: MEDICAL CENTER | Age: 62
End: 2022-12-22
Payer: MEDICAID

## 2023-02-25 NOTE — ED AVS SNAPSHOT
Home Care Instructions                                                                                                                Ashleigh Marquis   MRN: [id number]    Department:  [facility], Emergency Dept   Date of Visit:  6/5/2017            [facility], Emergency Dept    [address]    [facility] [address]    Phone:  [phone]      You were seen by     1. Ervin Youngblood D.O.    2. Catherine Chatman M.D.      Your Diagnosis Was     Acute alcohol intoxication, with unspecified complication     F10.129       These are the medications you received during your hospitalization from 06/05/2017 2204 to 06/06/2017 0546     Date/Time Order Dose Route Action    06/05/2017 2300 ibuprofen (MOTRIN) tablet 600 mg 600 mg Oral Refused      Follow-up Information     1. Schedule an appointment as soon as possible for a visit with [facility].    Why:  alcohol treatment center    Contact information    [address]  [facility] [address]  [phone]        Medication Information     Review all of your home medications and newly ordered medications with your primary doctor and/or pharmacist as soon as possible. Follow medication instructions as directed by your doctor and/or pharmacist.     Please keep your complete medication list with you and share with your physician. Update the information when medications are discontinued, doses are changed, or new medications (including over-the-counter products) are added; and carry medication information at all times in the event of emergency situations.               Medication List      ASK your doctor about these medications        Instructions    Morning Afternoon Evening Bedtime    clonazepam 1 MG Tabs   Commonly known as:  KLONOPIN        Take 0.5 mg by mouth 2 times a day. Unknown dose   Dose:  0.5 mg                        fluoxetine 10 MG Caps   Commonly known as:  PROZAC        Take 10 mg by mouth every day.  Unknown dose   Dose:  10 mg                        lisinopril 10 MG Tabs   Commonly known as:  PRINIVIL        Take 10 mg by mouth.   Dose:  10 mg                        spironolactone 25 MG Tabs   Commonly known as:  ALDACTONE        Take 25 mg by mouth every day. Unknown dose   Dose:  25 mg                                  Discharge Instructions       Alcohol Problems  Most adults who drink alcohol drink in moderation (not a lot) are at low risk for developing problems related to their drinking. However, all drinkers, including low-risk drinkers, should know about the health risks connected with drinking alcohol.  RECOMMENDATIONS FOR LOW-RISK DRINKING   Drink in moderation. Moderate drinking is defined as follows:   · Men - no more than 2 drinks per day.  · Nonpregnant women - no more than 1 drink per day.  · Over age 65 - no more than 1 drink per day.  A standard drink is 12 grams of pure alcohol, which is equal to a 12 ounce bottle of beer or wine cooler, a 5 ounce glass of wine, or 1.5 ounces of distilled spirits (such as whiskey, gama, vodka, or rum).   ABSTAIN FROM (DO NOT DRINK) ALCOHOL:  · When pregnant or considering pregnancy.  · When taking a medication that interacts with alcohol.  · If you are alcohol dependent.  · A medical condition that prohibits drinking alcohol (such as ulcer, liver disease, or heart disease).  DISCUSS WITH YOUR CAREGIVER:  · If you are at risk for coronary heart disease, discuss the potential benefits and risks of alcohol use: Light to moderate drinking is associated with lower rates of coronary heart disease in certain populations (for example, men over age 45 and postmenopausal women). Infrequent or nondrinkers are advised not to begin light to moderate drinking to reduce the risk of coronary heart disease so as to avoid creating an alcohol-related problem. Similar protective effects can likely be gained through proper diet and exercise.  · Women and the elderly have smaller  amounts of body water than men. As a result women and the elderly achieve a higher blood alcohol concentration after drinking the same amount of alcohol.  · Exposing a fetus to alcohol can cause a broad range of birth defects referred to as Fetal Alcohol Syndrome (FAS) or Alcohol-Related Birth Defects (ARBD). Although FAS/ARBD is connected with excessive alcohol consumption during pregnancy, studies also have reported neurobehavioral problems in infants born to mothers reporting drinking an average of 1 drink per day during pregnancy.  · Heavier drinking (the consumption of more than 4 drinks per occasion by men and more than 3 drinks per occasion by women) impairs learning (cognitive) and psychomotor functions and increases the risk of alcohol-related problems, including accidents and injuries.  CAGE QUESTIONS:   · Have you ever felt that you should Cut down on your drinking?  · Have people Annoyed you by criticizing your drinking?  · Have you ever felt bad or Guilty about your drinking?  · Have you ever had a drink first thing in the morning to steady your nerves or get rid of a hangover (Eye opener)?  If you answered positively to any of these questions: You may be at risk for alcohol-related problems if alcohol consumption is:   · Men: Greater than 14 drinks per week or more than 4 drinks per occasion.  · Women: Greater than 7 drinks per week or more than 3 drinks per occasion.  Do you or your family have a medical history of alcohol-related problems, such as:  · Blackouts.  · Sexual dysfunction.  · Depression.  · Trauma.  · Liver dysfunction.  · Sleep disorders.  · Hypertension.  · Chronic abdominal pain.  · Has your drinking ever caused you problems, such as problems with your family, problems with your work (or school) performance, or accidents/injuries?  · Do you have a compulsion to drink or a preoccupation with drinking?  · Do you have poor control or are you unable to stop drinking once you have  started?  · Do you have to drink to avoid withdrawal symptoms?  · Do you have problems with withdrawal such as tremors, nausea, sweats, or mood disturbances?  · Does it take more alcohol than in the past to get you high?  · Do you feel a strong urge to drink?  · Do you change your plans so that you can have a drink?  · Do you ever drink in the morning to relieve the shakes or a hangover?  If you have answered a number of the previous questions positively, it may be time for you to talk to your caregivers, family, and friends and see if they think you have a problem. Alcoholism is a chemical dependency that keeps getting worse and will eventually destroy your health and relationships. Many alcoholics end up dead, impoverished, or in half-way. This is often the end result of all chemical dependency.  · Do not be discouraged if you are not ready to take action immediately.  · Decisions to change behavior often involve up and down desires to change and feeling like you cannot decide.  · Try to think more seriously about your drinking behavior.  · Think of the reasons to quit.  WHERE TO GO FOR ADDITIONAL INFORMATION   · The National [place] on Alcohol Abuse and Alcoholism (NIAAA)  www.niaaa.nih.gov  · National [place] on Alcoholism and Drug Dependence (NCADD)  www.ncadd.org  · American Society of Addiction Medicine (ASAM)  www.asam.org   Document Released: 12/18/2006 Document Revised: 03/11/2013 Document Reviewed: 08/05/2009  ExitCare® Patient Information ©2014 [employer].            Patient Information     Patient Information    Following emergency treatment: all patient requiring follow-up care must return either to a private physician or a clinic if your condition worsens before you are able to obtain further medical attention, please return to the emergency room.     Billing Information    At [facility], we work to make the billing process streamlined for our patients.  Our Representatives are here to answer  any questions you may have regarding your hospital bill.  If you have insurance coverage and have supplied your insurance information to us, we will submit a claim to your insurer on your behalf.  Should you have any questions regarding your bill, we can be reached online or by phone as follows:  Online: You are able pay your bills online or live chat with our representatives about any billing questions you may have. We are here to help Monday - Friday from 8:00am to 7:30pm and 9:00am - 12:00pm on Saturdays.  Please visit [email or web address]  for more information.   Phone:  [phone] or [phone]    Please note that your emergency physician, surgeon, pathologist, radiologist, anesthesiologist, and other specialists are not employed by [facility] and will therefore bill separately for their services.  Please contact them directly for any questions concerning their bills at the numbers below:     Emergency Physician Services:  [phone]  [facility]:  [phone]  [facility]:  [phone]  [facility]:  [phone]    1. Your final bill may vary from the amount quoted upon discharge if all procedures are not complete at that time, or if your doctor has additional procedures of which we are not aware. You will receive an additional bill if you return to the Emergency Department at [facility] for suture removal regardless of the facility of which the sutures were placed.     2. Please arrange for settlement of this account at the emergency registration.    3. All self-pay accounts are due in full at the time of treatment.  If you are unable to meet this obligation then payment is expected within 4-5 days.     4. If you have had radiology studies (CT, X-ray, Ultrasound, MRI), you have received a preliminary result during your emergency department visit. Please contact the radiology department [phone] to receive a copy  of your final result. Please discuss the Final result with your primary physician or with the follow up physician provided.     Crisis Hotline:  [place] Crisis Hotline:  0-936-TXXVMCP or 1-823.353.3574  [place] Crisis Hotline:    1-991.943.8139 or 192-105-4351         ED Discharge Follow Up Questions    1. In order to provide you with very good care, we would like to follow up with a phone call in the next few days.  May we have your permission to contact you?     YES /  NO    2. What is the best phone number to call you? (       )_____-__________    3. What is the best time to call you?      Morning  /  Afternoon  /  Evening                   Patient Signature:  ____________________________________________________________    Date:  ____________________________________________________________                (1) Other Medications/None

## 2024-06-14 NOTE — ED NOTES
[facility]  Cardiovascular Services Outpatient Visit  Cardiology Follow Up       Patient Name:  Angelique Espinosa  Primary Clinician:  Edison Rucker MD  Primary Cardiologist: Dr. Camacho Cabral  Other Clinicians:   Nephrology- Dr Garza    REASON FOR VISIT:    No chief complaint on file.      Cardiac Problem List:  Paraxysmal Supraventricular Tachycardia  Holter Monitor (06/09/2015) - 6 beats on holter monitor  Premature Supraventricular beats and Premature Ventricular Contractions  Carotid Artery Stenosis  S/p CEA (2012) on left  US Carotid (12/4/2023)- R <50%, L 50-69%  Hyperlipidemia  Last Lipid Panel (12/05/2022) - LDL 67  On Rosuvastatin (Crestor)   Hypertension         Echo (***)- EF ***%, GLS ***%, grade *** diastolic dysfunction, IVSd ***cm, LVDD ***cm, RVSP ***mmHg, RIKKI ***ml/m²  Ejection Fraction (%)   Date Value   05/02/2024 70   12/08/2023 68   08/12/2019 73.0       Noncardiac Considerations:  Peripheral vascular disease  S/p Left Iliac Stent (08/2/2016)   Pulmonary embolism (05/2015), on chronic anticoagulation   Chronic obstructive pulmonary disease  Former smoker, quit 2012  Chronic kidney disease    Anemia with bleed   Insomnia  Depression     Pertinent ED Visits/Hospitalizations:  ED (1/4/2024)- Palpitations, HTN  ED (***)- ***  Admit (***-***)- ***, DC wt ***lbs/***kg    HISTORY OF PRESENT ILLNESS  Angelique Espinosa is a 89 year old female who presents today for BP, SOB, and ankle swelling.    She was seen in ED on 1/4/2024 with palpitations and HTN. She reported that when she began taking the Hydralazine she was under the impression she was to stop her other BP meds (amlodipine and Atacand). These were resumed.     Last cardiology visit was with me on 02/01/2024.  Continues to have sinus symptoms.  She gets short of breath when walking because she's congested. Reports occasional palpitations. She's been monitoring home blood pressures and they're looking great - systolics  Patient resting in bed, NAD. Respirations even and unlabored. Call light in reach. Will continue to monitor     in the 120-130s. No changes to cardiac regimen.    Per TE 6/14, reports SOB with minimal exertion such as getting dressed and getting the mail. States her BP is high and think bp meds are not working. Reports new ankle swelling within the last 6 months. Patient declined going to the ED.  Scheduled with cardiology.     Saw Edison Rucker MD 4/11/2024.  Reports continued SOB.  Per TE on 4/12/2024, NT proBNP elevated at 1502.  Started on Lasix 20 mg BID.  Per TE on 4/23/2024, kidney function is slightly worse and lasix was stopped.     Any other pertinent visits/changes since last seen***    Today***    []  YES    []  NO Angina:   []  YES    []  NO Palpitations  []  YES    []  NO Claudication:   []  YES    []  NO Syncope/Dizziness/Lightheadedness:   []  YES    []  NO Orthopnea:   []  YES    []  NO PND (paroxysmal nocturnal dyspnea):   []  YES    []  NO Pedal edema:   []  YES    []  NO Abdomen Swelling:   []  YES    []  NO Early Satiety:   []  YES    []  NO Weight gain:  []  YES    []  NO Dyspnea on Exertion/Shortness of breath  []  YES    []  NO Diuretic Resistance  []  YES    []  NO ICD Shocks [] N/A  []  YES    []  NO Hospitalization:    []  YES    []  NO Emergency Room Visit:   []  YES    []  NO Other:     COMPLIANCE   Description   []  YES    []  NO Medication:   []  YES    []  NO Diet:    SUBSTANCE USE  []  YES    []  NO Tobacco:   []  YES    []  NO Alcohol:  []  YES    []  NO Other substances:     ALLERGIES  ALLERGIES:   Allergen Reactions    Benadryl Allergy SWELLING and Other (See Comments)     Blood pressure \"gloria high\" benadryl has paradoxical effect.  Tongue swelling    Metoprolol Other (See Comments)     Bradycardia with syncopal episode (2019)    Mold   (Environmental) Other (See Comments)     Eyes swelling and runny nose    Pollen Other (See Comments)     Eyes swelling and runny nose       MEDICATIONS:  Current Outpatient Medications   Medication Sig Dispense Refill    traZODone (DESYREL) 50 MG tablet TAKE  2 TABLETS BY MOUTH EVERY NIGHT 180 tablet 0    citalopram (CeleXA) 20 MG tablet Take 1.5 tablets by mouth daily. 135 tablet 1    amLODIPine (NORVASC) 10 MG tablet Take 1 tablet by mouth every morning. 90 tablet 3    hydrALAZINE (APRESOLINE) 25 MG tablet Take 1 tablet by mouth in the morning and 1 tablet at noon and 1 tablet in the evening. 270 tablet 3    candesartan (ATACAND) 32 MG tablet Take 1 tablet by mouth daily. 90 tablet 3    amoxicillin (AMOXIL) 500 MG capsule Take 4 pills by mouth one hour prior to dental procedures 20 capsule 3    Eliquis 2.5 MG Tab TAKE 1 TABLET BY MOUTH IN THE MORNING AND IN THE EVENING 180 tablet 3    rosuvastatin (CRESTOR) 40 MG tablet Take 1 tablet by mouth every other day. 45 tablet 3    Ferrous Sulfate (IRON PO) Take 2 tablets by mouth daily.      fluticasone (FLONASE) 50 MCG/ACT nasal spray Spray 1 spray in each nostril daily. 16 g 12    COLLAGEN PO       metroNIDAZOLE (METROCREAM) 0.75 % cream Apply topically 2 times daily. 45 g 3    diclofenac (VOLTAREN) 1 % gel Apply 4 g topically 4 times daily as needed (pain). 600 g 1    Multiple Vitamins-Minerals (CENTRUM PO) Take 1 tablet by mouth daily.      Cholecalciferol (VITAMIN D) 2000 units capsule Take 2,000 Units by mouth daily.      DISPENSE Compression stockings  20-30 mmHg 2 each 0     No current facility-administered medications for this visit.       Family History (pertinent cardiac family history):   **negative for cardiomyopathy, sudden cardiac death, arrhythmia     Social history:  ***     REVIEW OF SYSTEMS: 10 point review of systems was completed and is negative except as stated above.    PHYSICAL EXAMINATION:  Vitals:  There were no vitals taken for this visit.  BMI:  There is no height or weight on file to calculate BMI.  Physical Exam    Laboratory:    Recent Labs   Lab 06/07/24  1524 05/31/24  1436 05/20/24  1044 05/13/24  1219 05/08/24  1149   Sodium 139 140 140 138 137   Potassium 4.4 4.6 4.1 5.2* 4.5   BUN 27* 40*  39* 38* 50*   Creatinine 2.20* 2.22* 2.14* 2.60* 2.19*   Glomerular Filtration Rate 21* 20* 21* 17* 21*       Recent Labs   Lab 06/07/24  1524 04/22/24  1133 04/11/24  1516 01/04/24  1654   NT-proBNP 2,016* 979* 1,502* 1,277*       No results for input(s): \"URIC\" in the last 8765 hours.    Recent Labs   Lab 04/11/24  1516   Hemoglobin A1C 4.8       Recent Labs   Lab 04/11/24  1516   TSH 4.577       Recent Labs   Lab 06/07/24  1524 06/07/24  0949 06/06/24  1047 05/31/24  1436 04/24/24  1209   WBC 4.0* 3.8* 6.0 4.4 4.5   RBC 3.10* 2.93* 3.12* 3.31* 3.54*   HGB 8.8* 8.4* 8.9* 9.4* 9.8*   HCT 28.0* 27.7* 29.2* 31.4* 32.0*   MCV 90.3 94.5 93.6 94.9 90.4   MCH 28.4 28.7 28.5 28.4 27.7   MCHC 31.4* 30.3* 30.5* 29.9* 30.6*   RDW-CV 14.8 14.9 15.0 15.1* 16.8*    168 185 209 172       No results for input(s): \"CHOLESTEROL\", \"TRIGLYCERIDE\", \"HDL\", \"CHOHDL\", \"ER1\", \"CALCLDL\", \"LDLHDL\", \"QVLDL\", \"NONHDL\" in the last 8765 hours.      DIAGNOSTICS:  EKG (1/6/2024):  Unusual P axis, possible ectopic atrial rhythm with premature atrial complexes   Nonspecific ST and T wave abnormality   Abnormal ECG   When compared with ECG of 04-AUG-2020 14:27, premature atrial complexes are now present   Vent. rate has increased BY  44 BPM   Nonspecific T wave abnormality now evident in Lateral leads   Confirmed by AMARILYS NARVAZE, DAVID ([id number]) on 1/6/2024 11:33:28 AM      Echo (12/8/2023):  Final Impressions    * Normal left ventricular systolic function.    * Left ventricular ejection fraction, 68%. GLS -23%.    * No left ventricular regional wall motion abnormalities.    * Mildly increased left ventricular wall thickness.    * Grade II left ventricular diastolic dysfunction.    * Aortic valve sclerosis without stenosis. Trace AI.    * Moderate mitral annular calcification.    * Normal right ventricular size and systolic function.    * Mild pulmonary hypertension, RVSP 44 mmHg.    * No pericardial effusion.    * Compared to prior study (8/12/19)  there is no significant change.     Carotid US (12/4/2023):  IMPRESSION:  1. RIGHT CAROTID: There is less than 50% stenosis using NASCET criteria adapted to duplex ultrasound. Mild focal plaque formation as detailed above.  2. LEFT CAROTID: There is between 50-69% stenosis using NASCET criteria adapted to duplex ultrasound. Moderate focal plaque formation as detailed above.  3. Vertebral artery flow is antegrade bilaterally.     US vasc MARCO A (09/21/2023)   IMPRESSION:   Mild ischemia bilaterally left greater right with MARCO A 0.91 right 0.67 left. This has declined compared to prior     US vasc iliac (09/21/2023)   IMPRESSION:  Patent right iliac stents without evidence of restenosis.     US large vessels duplex (07/22/2022)   IMPRESSION:  Right iliac:  Patent external iliac stent with mild elevation of velocities (peak 244 cm/s) unchanged from prior (266 cm/s on 6/15/2021). No evidence for progression.     06/15/2021 US Large Vessels Duplex  IMPRESSION:  In-stent restenosis of right common iliac artery of 50%  Patent right external iliac artery stent     CTA Head/Neck (07/30/2020):  CTA neck:    1.  No evidence of acute extracranial vascular occlusion or near-occlusion.  2.  Widely patent left carotid endarterectomy postoperative changes.  3.  Spiculated 5 mm nodule within the right upper lobe which was present on remote prior study from 6/18/2012 but was suboptimally evaluated on the prior study secondary to differences in slice thickness.   CTA head:    1.  No evidence of intracranial large vessel occlusion.    2.  No significant intracranial stenosis, aneurysm, or vascular malformation.     08/08/2019 Holter Monitor  The predominant rhythm during recording is sinus with average heart rate 63 bpm  Minimum heart rate recorded is 45 bpm, sinus bradycardia at 6:41 PM.  Maximum heart rate recorded is 119 bpm, sinus tachycardia at 12:16 PM.  Frequent isolated premature ventricular complexes are present, total  #151.  Frequent premature atrial complexes are seen, total #377.  There are a few short runs of narrow complex rhythm during the exam  A 7 beat run of accelerated junctional rhythm with heart rate 99 bpm.  A 3 beat run of SVT occurred at 12:04 PM with heart rate 200 bpm  Normal atrioventricular and intraventricular conduction is present  There is no significant sinus pause  There is no ST segment change noted  No symptoms are reported in diary  Overall quality of study is good     2012 Transesophageal Echocardiography (BENJY)   Normal left ventricular systolic function.   Normal right ventricular size and systolic function.   Mildly increased left atrial size.   No thrombus visualized in the left atrial appendage.   Severe lipomatous hypertrophy of atrial septum.   No right-to-left shunt seen at the atrial level with contrast.           ASSESSMENT:  {card/HF assessment note:676190}      RECOMMENDATIONS:  - ***  - ***  - Labs: {labs:935907}  - Testing: {cardiac testin}    Follow-up:  Clinic:  {#:99915} {DAYS/WEEKS/MONTHS/YEARS:716578}    Patient understands they can return sooner if any issues should arise.     ADMINISTRATIVE  I personally spent over half of a total {avtime:2040} minutes in counseling and discussion with the patient and coordination of care as described above.    {Card Provider Signature:683886}  24     40 mmHg.    * Mild tricuspid valve regurgitation.    * No pericardial effusion.    * Compared to prior study (12/8/23), there is no significant change.     1/4/2024 Chest Xray  IMPRESSION:  Comparison August 7, 2019.  Numerous chest leads are present which obscure portions of the left chest greater than right. Lungs appear grossly clear.  No pneumothorax or appreciable pleural fluid.  Heart size and pulmonary vascular stable.    Carotid US (12/4/2023):  IMPRESSION:  1. RIGHT CAROTID: There is less than 50% stenosis using NASCET criteria adapted to duplex ultrasound. Mild focal plaque formation as detailed above.  2. LEFT CAROTID: There is between 50-69% stenosis using NASCET criteria adapted to duplex ultrasound. Moderate focal plaque formation as detailed above.  3. Vertebral artery flow is antegrade bilaterally.     US vasc MARCO A (09/21/2023)   IMPRESSION:   Mild ischemia bilaterally left greater right with MARCO A 0.91 right 0.67 left. This has declined compared to prior     US vasc iliac (09/21/2023)   IMPRESSION:  Patent right iliac stents without evidence of restenosis.     US large vessels duplex (07/22/2022)   IMPRESSION:  Right iliac:  Patent external iliac stent with mild elevation of velocities (peak 244 cm/s) unchanged from prior (266 cm/s on 6/15/2021). No evidence for progression.     06/15/2021 US Large Vessels Duplex  IMPRESSION:  In-stent restenosis of right common iliac artery of 50%  Patent right external iliac artery stent     CTA Head/Neck (07/30/2020):  CTA neck:    1.  No evidence of acute extracranial vascular occlusion or near-occlusion.  2.  Widely patent left carotid endarterectomy postoperative changes.  3.  Spiculated 5 mm nodule within the right upper lobe which was present on remote prior study from 6/18/2012 but was suboptimally evaluated on the prior study secondary to differences in slice thickness.   CTA head:    1.  No evidence of intracranial large vessel occlusion.    2.  No  significant intracranial stenosis, aneurysm, or vascular malformation.     08/08/2019 Holter Monitor  The predominant rhythm during recording is sinus with average heart rate 63 bpm  Minimum heart rate recorded is 45 bpm, sinus bradycardia at 6:41 PM.  Maximum heart rate recorded is 119 bpm, sinus tachycardia at 12:16 PM.  Frequent isolated premature ventricular complexes are present, total #151.  Frequent premature atrial complexes are seen, total #377.  There are a few short runs of narrow complex rhythm during the exam  A 7 beat run of accelerated junctional rhythm with heart rate 99 bpm.  A 3 beat run of SVT occurred at 12:04 PM with heart rate 200 bpm  Normal atrioventricular and intraventricular conduction is present  There is no significant sinus pause  There is no ST segment change noted  No symptoms are reported in diary  Overall quality of study is good     07/31/2012 Transesophageal Echocardiography (BENJY)   Normal left ventricular systolic function.   Normal right ventricular size and systolic function.   Mildly increased left atrial size.   No thrombus visualized in the left atrial appendage.   Severe lipomatous hypertrophy of atrial septum.   No right-to-left shunt seen at the atrial level with contrast.         ASSESSMENT:  1. Essential hypertension, continues to trend elevated at times though with note of intermittently missing mid-day hydralazine dose  2. Paroxysmal Supraventricular Tachycardia  Holter Monitor (06/09/2015) - 6 beats on holter monitor  3. Hyperlipidemia  Last Lipid Panel (12/05/2022) - LDL 67  On Rosuvastatin (Crestor)   4. Peripheral vascular disease  S/p Left Iliac Stent (08/2/2016)   5. Bilateral carotid stenosis, s/p Left Carotid Endarterectomy (2012)  Last US (7/2020)- R 50% & L 50-69%  6. Premature supraventricular beats and premature ventricular contractions  7. History of pulmonary embolism (05/2015), on chronic Eliquis anticoagulation (2.5 mg BID) which has been on hold due  to recent renal biopsy and was recommended to be resumed after ER evaluation with no evidence of bleeding from biopsy site  8. Dyspnea on exertion, being started back on diuretic per nephrology recommendations  9. Lower extremity edema, suspect multifactorial including dependent edema and hypoalbuminemia, also possibly due in part to CCB side effects  10. Progressive anemia secondary to CKD but also with note of recent occult positive stool in ER -> message sent to PCP to review/discuss further recommendations on possible GI evaluation  11. CKD stage recently stage 4    RECOMMENDATIONS:  The patient's daughter received a call from nephrology RN during the visit and patient was recommended to start Bumex 1 mg daily and ok to resume her previous anticoagulation. The Bumex was being sent to the patient's preferred pharmacy per their team. Otherwise will continue present cardiac medications including Norvasc 10 mg daily, hydralazine 25 mg TID (with the patient indicating she will work on taking it TID as prescribed more consistently), candesartan 32 mg daily, Crestor 40 mg daily. Stay active as tolerated, make positional changes carefully. Recommend to elevate legs when seated & wear compression stockings on daily in the AM & off at bedtime as needed for leg swelling. Recommend a heart-healthy, low sodium diet. She was advised to weigh herself daily and call for weight gain of 3# overnight/5# in a week. Follow up with Dr. Cabral in November or sooner if new cardiac issues arise.    ADMINISTRATIVE  I personally spent over half of a total 39 min minutes in counseling and discussion with the patient and coordination of care as described above.    RAMÓN Brock  06/17/24

## 2024-06-27 NOTE — ED NOTES
"Assist RN: Called into room by patient stating \"My head is really itchy. I think I have lice.\" Extensive inspection of head and hair completed by this RN, no lice noted. Dry scalp and dandruff noted. Patient denies further needs at this time. 1:1 sitter remains in place.  " Telephone Intake     Diagnosis: hemorrhoids/rectal bleeding    Referral Intake  Referral from: Tesfaye Coates DO     Care Team:  Patient Care Team:  Khalif Causey MD as PCP - General (Internal Medicine)  Tameka Mendosa RN as Care Transitions Nurse (Registered Nurse)    HPI:  Jacqueline Brasher is a 68 year old female whom is referred for hemorrhoids/rectal bleeding. The patient reports a year history of bleeding with hemorrhoids on Eliquis patient does have a history of hemorrhoids. The patient has been experiencing symptoms of bleeding, the hemorrhoids are not painful.  The patient a long history of bleeding from the hemorrhoids, especially when she was straining. When patient was prescribed Eliquis she was experiencing bleeding constantly.  The patient had a formed and soft BM in the hospital. Today patient felt constipated. Typically has 1 BMs per day.     History of anal surgery:      Pertinent Surgical Hx:  Past Surgical History:  No date: ABDOMEN SURGERY      Comment:  lap with gift procedure  1984: BREAST MASS EXCISION; Right      Comment:  Excisional biopsy  07/20/2017: BREAST RECONSTRUCTION; Bilateral      Comment:  Bilateral highly cohesive silicone implants with fat                grafting  12/22/2016: BREAST SURGERY; Left      Comment:  needle biopsy, atypical lobular hyperplasia  07/17/2018: COLONOSCOPY DIAGNOSTIC      Comment:  Affi, MAC, screening, 1 sessile serrate adenoma, recall                3 years  07/20/2021: COLONOSCOPY DIAGNOSTIC      Comment:  Affi internal hemorrhoids SSA polyp, recall 5 years  2007: CT COLONOSCOPY SCREENING  1994: DILATION AND EVACUATION  07/20/2021: ESOPHAGOGASTRODUODENOSCOPY TRANSORAL FLEX W/BX SINGLE OR   MULT      Comment:  Affi mild erosive gastritis Hpylori neg  2010: KNEE ARTHROSCOPY W/ MENISCAL REPAIR; Right  01/23/2017: MASTECTOMY; Bilateral      Comment:  with sentinel node biopsy.    09/30/2013: US GUIDED BRST LESION ASP/BX/WIRE LOC; Right       Comment:  11:00 position    Last Colonoscopy: 06/21/2024   Provider: Linnette Pedroza MD     Findings:   Pan diverticulosis with both small and wide mouthed diverticulae.  Primarily moderately size external hemorrhoids with one external hemorrhoid having a thrombosed appearance.  Small internal hemorrhoids not amenable to banding.  No rectal varices.  No polyps or mass lesions concerning for malignancy were noted throughout the entire examination of the colon.    Recall:   -Recommend referral to colorectal surgery for possible hemorrhoidectomy evaluation should bleeding persist  -In the interim can continue conservative management for hemorrhoids including sitz bath, daily Metamucil (fiber supplementation), at least 6-8 glasses of fluids daily, and can consider daily MiraLax  -May resume AC from our perspective  -Follow up for colon cancer screening in 5 years given family history in first degree relative and elevated risk  -Recommend continued efforts with alcohol cessation

## 2024-11-18 NOTE — PROGRESS NOTES
"Pharmacy Chemotherapy calculation:    DX: Breast CA Stage IIA ER-, TN-, HER2-    Cycle 2, Day 8  Previous treatment = C2D1 on 3/26/22    Regimen: Pembrolizumab + Carboplatin + Gemcitabine  *Dosing Reference*  Pembrolizumab 400 mg IV over 30 minutes on Day 1  42-day cycle until DP or UT or until up to 24 months of therapy has been completed concurrent with  Carboplatin AUC 2 IV over 30 minutes on Days 1 and 8  Gemcitabine 1000 mg/m2 IV over 30 minutes on Days 1 and 8   3/26/22, starting with C2, dose reduced by Tori MCCARTHY to 750 mg/m2    21-day cycle until DP or UT  NCCN Guidelines for Breast Cancer V.2.2022.  Ivan J, et al. Lancet. 2020;396(42335);3303-9558.  Risa M, et al. Eur J Cancer. 2020;131;68-75.    Allergies: Sulfa drugs, Toradol, and Aspirin  /78   Pulse 92   Temp 36.5 °C (97.7 °F) (Temporal)   Resp 18   Ht 1.62 m (5' 3.78\")   Wt 82.6 kg (182 lb 1.6 oz)   LMP 11/02/2015   SpO2 95%   BMI 31.47 kg/m²   Body surface area is 1.93 meters squared.     Labs (4/2/22) within treatment plan parameters.  SCr = 0.65 CrCl ~ 113 ml/min (min SCr 0.7 used)      Pembrolizumab (Keytruda) 400 mg fixed dose   No calculation required, okay to treat with final dose = 400 mg IV    Carboplatin (Paraplatin) AUC 2 (113 mL/min + 25) = 276 mg   <10% difference, okay to treat with final dose = 276 mg IV    Gemcitabine (Gemzar) 750 mg/m2 x 1.93 m2 = 1447.5 mg   <10% difference, okay to treat with final dose = 1447 mg IV      Jessica Laws, PharmD  " Types: Cigarettes     Start date: 1992    Smokeless tobacco: Never    Tobacco comments:     0.75 ppd 05/28/24   Vaping Use    Vaping status: Never Used   Substance Use Topics    Alcohol use: Yes     Alcohol/week: 4.0 standard drinks of alcohol     Types: 4 Cans of beer per week    Drug use: Yes     Frequency: 4.0 times per week     Types: Marijuana (Weed)     Comment: used to smoke, now uses edibles.     ALLERGIES:No Known Allergies  FAMILY HISTORY:History reviewed. No pertinent family history.  CURRENT MEDICATIONS:  Current Outpatient Medications   Medication Sig Dispense Refill    albuterol sulfate HFA (VENTOLIN HFA) 108 (90 Base) MCG/ACT inhaler Inhale 2 puffs into the lungs every 6 hours as needed for Wheezing or Shortness of Breath 2 each 6    fluticasone-salmeterol (ADVAIR) 250-50 MCG/ACT AEPB diskus inhaler Inhale 1 puff into the lungs in the morning and 1 puff in the evening. 1 each 11    bumetanide (BUMEX) 1 MG tablet TAKE 1 TABLET BY MOUTH ONCE DAILY. 30 tablet 6    Spacer/Aero-Holding Chambers FLACA 1 Device by Does not apply route daily as needed (daily inhaler) 1 each 0    nicotine (NICODERM CQ) 21 MG/24HR Place 1 patch onto the skin daily 30 patch 3    apixaban (ELIQUIS) 5 MG TABS tablet Take 1 tablet by mouth 2 times daily 42 tablet 0    apixaban (ELIQUIS) 5 MG TABS tablet Take 1 tablet by mouth 2 times daily 180 tablet 1    albuterol (PROVENTIL) (2.5 MG/3ML) 0.083% nebulizer solution Take 3 mLs by nebulization every 6 hours as needed for Wheezing 360 mL 3    albuterol (ACCUNEB) 1.25 MG/3ML nebulizer solution Inhale 3 mLs into the lungs every 6 hours as needed for Wheezing 360 mL 3    Respiratory Therapy Supplies (NEBULIZER/TUBING/MOUTHPIECE) KIT 1 kit by Does not apply route daily as needed (breathing treatments) 2 kit 0    aspirin 81 MG chewable tablet Take 1 tablet by mouth daily      Misc. Devices (DIGITAL GLASS SCALE) MISC 1 Device by Does not apply route once for 1 dose For daily weights. 1 each
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
## 2025-01-23 NOTE — ASSESSMENT & PLAN NOTE
Not getting better with creams  Redness on inspection, minimal discharge  Started fluconazole   Report given to JIMENEZ Mccarthy.  All questions answered.

## 2025-05-12 NOTE — ED NOTES
"Pt extremely agitated and moving around in the bed, pt states  \"you guys aren't helping me at all, you're a fucking bitch, get away from me\" I hate this place, im leaving.\" pt had a choice between   " Scheduled appointment for:     OFFICE VISIT at  8:30 AM (30 min)Arrive by 8:10 AM  Friday June 13, 2025  Appointment Provider:Chayo Mccord MD in [facility]  Appointment Type  Outpatient  Appointment Notes  COLPOSCOPY    Ashlie Childers/ Patient

## 2025-05-23 NOTE — PROGRESS NOTES
"Sherry Lobato E \"Jerry\" (77 y.o. Female)       Date of Birth   1947    Social Security Number       Address   [address] [place] [address] [address] [place] [address]    Home Phone   [phone]    MRN   [id number]       Nondenominational   None    Marital Status   Single                            Admission Date   5/22/2025    Admission Type   Emergency    Admitting Provider   Angelia Taylor MD    Attending Provider   Angelia Taylor MD    Department, Room/Bed   [facility] INPATIENT, 209/1       Discharge Date       Discharge Disposition       Discharge Destination                                 Attending Provider: Angelia Taylor MD    Allergies: Atorvastatin Calcium, Gabapentin, Niacin, Oxycodone    Isolation: None   Infection: None   Code Status: CPR    Ht: 152.4 cm (60\")   Wt: 71.6 kg (157 lb 13.6 oz)    Admission Cmt: None   Principal Problem: Altered mental status [R41.82]                   Active Insurance as of 5/22/2025       Primary Coverage       Payor Plan Insurance Group Employer/Plan Group    MEDICARE MEDICARE A & B        Payor Plan Address Payor Plan Phone Number Payor Plan Fax Number Effective Dates    [address] [phone]  7/1/2014 - None Entered    [facility] [id number]         Subscriber Name Subscriber Birth Date Member ID       SHERRY LOBATO 1947 [id number]               Secondary Coverage       Payor Plan Insurance Group Employer/Plan Group    [employer] SUP AAR HE[employer] CARE OPTIONS        Payor Plan Address Payor Plan Phone Number Payor Plan Fax Number Effective Dates    [facility] [phone]  1/1/2019 - None Entered    [address]       [facility] [address]         Subscriber Name Subscriber Birth Date Member ID       SHERRY LOBATO 1947 [id number]                     Emergency Contacts        (Rel.) Home Phone Work Phone Mobile Phone    Ari Connor (Significant Other) [phone] -- [phone]    janethjoy (Relative) [phone] -- " Patient arrived to infusion center for Udenyca. Udenyca given to LUQ abdomen. Patient left infusion center with no apparent distress.    [phone]    CALEB DAVILA (Relative) -- -- --

## (undated) DEVICE — FORCEP RADIAL JAW 4 STANDARD CAPACITY W/NEEDLE 240CM (40EA/BX)

## (undated) DEVICE — SLEEVE VASO CALF MED - (10PR/CA)

## (undated) DEVICE — ELECTRODE 850 FOAM ADHESIVE - HYDROGEL RADIOTRNSPRNT (50/PK)

## (undated) DEVICE — LACTATED RINGERS INJ 1000 ML - (14EA/CA 60CA/PF)

## (undated) DEVICE — SET EXTENSION WITH 2 PORTS (48EA/CA) ***PART #2C8610 IS A SUBSTITUTE*****

## (undated) DEVICE — CONTAINER, SPECIMEN, STERILE

## (undated) DEVICE — HEAD HOLDER JUNIOR/ADULT

## (undated) DEVICE — PACK MINOR BASIN - (2EA/CA)

## (undated) DEVICE — SUTURE 4-0 MONOCRYL PLUS PS-2 - 27 INCH (36/BX)

## (undated) DEVICE — TUBE CONNECTING SUCTION - CLEAR PLASTIC STERILE 72 IN (50EA/CA)

## (undated) DEVICE — PROTECTOR ULNA NERVE - (36PR/CA)

## (undated) DEVICE — CON SEDATION EA ADDL 15 MIN

## (undated) DEVICE — DERMABOND ADVANCED - (12EA/BX)

## (undated) DEVICE — MASK ANESTHESIA ADULT  - (100/CA)

## (undated) DEVICE — TUBE SUCTION YANKAUER  1/4 X 6FT (20EA/CA)"

## (undated) DEVICE — BITE BLOCK ADULT 60FR (100EA/CA)

## (undated) DEVICE — BASIN EMESIS DISP. - (250/CA)

## (undated) DEVICE — SOD. CHL 10CC SYRINGE PREFILL - W/10 CC (30/BX)

## (undated) DEVICE — MASK PANORAMIC OXYGEN PRO2 (30EA/CA)

## (undated) DEVICE — KIT CUSTOM PROCEDURE SINGLE FOR ENDO  (15/CA)

## (undated) DEVICE — SPONGE GAUZE NON-STERILE 4X4 - (2000/CA 10PK/CA)

## (undated) DEVICE — TUBING CLEARLINK DUO-VENT - C-FLO (48EA/CA)

## (undated) DEVICE — SYRINGE 6 CC 20 GA X 1 1/2 - NDL SAFETY  (50/BX)

## (undated) DEVICE — MANIFOLD NEPTUNE 1 PORT (20/PK)

## (undated) DEVICE — GLOVE, LITE (PAIR)

## (undated) DEVICE — MASK WITH FACE SHIELD (25/BX 4BX/CA)

## (undated) DEVICE — CANNULA W/ SUPPLY TUBING O2 - (50/CA)

## (undated) DEVICE — CANISTER SUCTION RIGID RED 1500CC (40EA/CA)

## (undated) DEVICE — SODIUM CHL IRRIGATION 0.9% 1000ML (12EA/CA)

## (undated) DEVICE — PAD PREP 24 X 48 CUFFED - (100/CA)

## (undated) DEVICE — BLOCK BITE ENDOSCOPIC 2809 - (100/BX) INTERMEDIATE

## (undated) DEVICE — SENSOR OXIMETER ADULT SPO2 RD SET (20EA/BX)

## (undated) DEVICE — KIT  I.V. START (100EA/CA)

## (undated) DEVICE — GLOVE BIOGEL SZ 8 SURGICAL PF LTX - (50PR/BX 4BX/CA)

## (undated) DEVICE — TOWEL STOP TIMEOUT SAFETY FLAG (40EA/CA)

## (undated) DEVICE — SYRINGE SAFETY 3 ML 18 GA X 1 1/2 BLUNT LL (100/BX 8BX/CA)

## (undated) DEVICE — CHLORAPREP 26 ML APPLICATOR - ORANGE TINT(25/CA)

## (undated) DEVICE — SHEET TRANSVERSE LAP - (12EA/CA)

## (undated) DEVICE — SET LEADWIRE 5 LEAD BEDSIDE DISPOSABLE ECG (1SET OF 5/EA)

## (undated) DEVICE — WATER IRRIGATION STERILE 1000ML (12EA/CA)

## (undated) DEVICE — ELECTRODE DUAL RETURN W/ CORD - (50/PK)

## (undated) DEVICE — GOWN WARMING STANDARD FLEX - (30/CA)

## (undated) DEVICE — BITE BLOCK, DISP.

## (undated) DEVICE — TUBE E-T HI-LO CUFF 6.5MM (10EA/BX)

## (undated) DEVICE — FILM CASSETTE ENDO

## (undated) DEVICE — GOWN SURGEONS X-LARGE - DISP. (30/CA)

## (undated) DEVICE — GLOVE BIOGEL INDICATOR SZ 6.5 SURGICAL PF LTX - (50PR/BX 4BX/CA)

## (undated) DEVICE — KIT ANESTHESIA W/CIRCUIT & 3/LT BAG W/FILTER (20EA/CA)

## (undated) DEVICE — GLOVE BIOGEL SZ 6.5 SURGICAL PF LTX (50PR/BX 4BX/CA)

## (undated) DEVICE — CON SEDATION/>5 YR 1ST 15 MIN

## (undated) DEVICE — CANNULA O2 COMFORT SOFT EAR ADULT 7 FT TUBING (50/CA)

## (undated) DEVICE — CATHETER IV 20 GA X 1-1/4 ---SURG.& SDS ONLY--- (50EA/BX)

## (undated) DEVICE — SYRINGE DISP. 60 CC LL - (30/BX, 12BX/CA)**WHEN THESE ARE GONE ORDER #500206**

## (undated) DEVICE — BOVIE BLADE COATED &INSULATED - 25/PK

## (undated) DEVICE — SUCTION INSTRUMENT YANKAUER BULBOUS TIP W/O VENT (50EA/CA)

## (undated) DEVICE — SYRINGE SAFETY 5 ML 18 GA X 1-1/2 BLUNT LL (100/BX 4BX/CA)

## (undated) DEVICE — SYRINGE DISP. 50CC LS - (40/BX)

## (undated) DEVICE — SYRINGE SAFETY 10 ML 18 GA X 1 1/2 BLUNT LL (100/BX 4BX/CA)

## (undated) DEVICE — SYRINGE 3 CC 22 GA X 1-1/2 - NDL SAFETY (50/BX 8BX/CA)

## (undated) DEVICE — GOWN SURGEONS LARGE - (32/CA)

## (undated) DEVICE — SODIUM CHL. INJ. 0.9% 500ML (24EA/CA 50CA/PF)

## (undated) DEVICE — SUTURE GENERAL

## (undated) DEVICE — SENSOR SPO2 NEO LNCS ADHESIVE (20/BX) SEE USER NOTES

## (undated) DEVICE — SYRINGE 30 ML LL (56/BX)

## (undated) DEVICE — CANISTER SUCTION 3000ML MECHANICAL FILTER AUTO SHUTOFF MEDI-VAC NONSTERILE LF DISP  (40EA/CA)

## (undated) DEVICE — SYRINGE 12 CC LUER TIP - (80/BX) OBSOLETE ITEM

## (undated) DEVICE — SUTURE 3-0 VICRYL PLUS SH - 8X 18 INCH (12/BX)